# Patient Record
Sex: FEMALE | Race: BLACK OR AFRICAN AMERICAN | NOT HISPANIC OR LATINO | URBAN - METROPOLITAN AREA
[De-identification: names, ages, dates, MRNs, and addresses within clinical notes are randomized per-mention and may not be internally consistent; named-entity substitution may affect disease eponyms.]

---

## 2017-10-21 ENCOUNTER — INPATIENT (INPATIENT)
Facility: HOSPITAL | Age: 82
LOS: 5 days | Discharge: HOME HEALTH | DRG: 871 | End: 2017-10-27
Attending: INTERNAL MEDICINE | Admitting: INTERNAL MEDICINE
Payer: MEDICARE

## 2017-10-21 VITALS
TEMPERATURE: 102 F | HEART RATE: 81 BPM | DIASTOLIC BLOOD PRESSURE: 82 MMHG | HEIGHT: 69 IN | SYSTOLIC BLOOD PRESSURE: 105 MMHG | RESPIRATION RATE: 16 BRPM | OXYGEN SATURATION: 94 % | WEIGHT: 274.92 LBS

## 2017-10-21 DIAGNOSIS — R50.9 FEVER, UNSPECIFIED: ICD-10-CM

## 2017-10-21 DIAGNOSIS — A41.9 SEPSIS, UNSPECIFIED ORGANISM: ICD-10-CM

## 2017-10-21 DIAGNOSIS — R06.02 SHORTNESS OF BREATH: ICD-10-CM

## 2017-10-21 DIAGNOSIS — R74.0 NONSPECIFIC ELEVATION OF LEVELS OF TRANSAMINASE AND LACTIC ACID DEHYDROGENASE [LDH]: ICD-10-CM

## 2017-10-21 DIAGNOSIS — N39.0 URINARY TRACT INFECTION, SITE NOT SPECIFIED: ICD-10-CM

## 2017-10-21 DIAGNOSIS — Z86.79 PERSONAL HISTORY OF OTHER DISEASES OF THE CIRCULATORY SYSTEM: ICD-10-CM

## 2017-10-21 DIAGNOSIS — N30.01 ACUTE CYSTITIS WITH HEMATURIA: ICD-10-CM

## 2017-10-21 DIAGNOSIS — E87.1 HYPO-OSMOLALITY AND HYPONATREMIA: ICD-10-CM

## 2017-10-21 DIAGNOSIS — Z29.9 ENCOUNTER FOR PROPHYLACTIC MEASURES, UNSPECIFIED: ICD-10-CM

## 2017-10-21 DIAGNOSIS — J96.01 ACUTE RESPIRATORY FAILURE WITH HYPOXIA: ICD-10-CM

## 2017-10-21 LAB
ALBUMIN SERPL ELPH-MCNC: 3.3 G/DL — LOW (ref 3.5–5)
ALP SERPL-CCNC: 170 U/L — HIGH (ref 40–120)
ALT FLD-CCNC: 91 U/L DA — HIGH (ref 10–60)
ANION GAP SERPL CALC-SCNC: 5 MMOL/L — SIGNIFICANT CHANGE UP (ref 5–17)
APPEARANCE UR: ABNORMAL
APTT BLD: 30.2 SEC — SIGNIFICANT CHANGE UP (ref 27.5–37.4)
AST SERPL-CCNC: 128 U/L — HIGH (ref 10–40)
BASE EXCESS BLDA CALC-SCNC: -1.8 MMOL/L — SIGNIFICANT CHANGE UP (ref -2–2)
BASE EXCESS BLDV CALC-SCNC: 2.3 MMOL/L — HIGH (ref -2–2)
BILIRUB SERPL-MCNC: 0.9 MG/DL — SIGNIFICANT CHANGE UP (ref 0.2–1.2)
BILIRUB UR-MCNC: NEGATIVE — SIGNIFICANT CHANGE UP
BLOOD GAS COMMENTS ARTERIAL: SIGNIFICANT CHANGE UP
BLOOD GAS COMMENTS, VENOUS: SIGNIFICANT CHANGE UP
BUN SERPL-MCNC: 13 MG/DL — SIGNIFICANT CHANGE UP (ref 7–18)
CALCIUM SERPL-MCNC: 8.4 MG/DL — SIGNIFICANT CHANGE UP (ref 8.4–10.5)
CHLORIDE SERPL-SCNC: 100 MMOL/L — SIGNIFICANT CHANGE UP (ref 96–108)
CO2 SERPL-SCNC: 28 MMOL/L — SIGNIFICANT CHANGE UP (ref 22–31)
COLOR SPEC: YELLOW — SIGNIFICANT CHANGE UP
CREAT SERPL-MCNC: 0.97 MG/DL — SIGNIFICANT CHANGE UP (ref 0.5–1.3)
DIFF PNL FLD: ABNORMAL
GLUCOSE SERPL-MCNC: 186 MG/DL — HIGH (ref 70–99)
GLUCOSE UR QL: NEGATIVE — SIGNIFICANT CHANGE UP
HCO3 BLDA-SCNC: 23 MMOL/L — SIGNIFICANT CHANGE UP (ref 23–27)
HCO3 BLDV-SCNC: 27 MMOL/L — SIGNIFICANT CHANGE UP (ref 21–29)
HCT VFR BLD CALC: 42.3 % — SIGNIFICANT CHANGE UP (ref 34.5–45)
HGB BLD-MCNC: 13.3 G/DL — SIGNIFICANT CHANGE UP (ref 11.5–15.5)
HOROWITZ INDEX BLDA+IHG-RTO: 100 — SIGNIFICANT CHANGE UP
HOROWITZ INDEX BLDV+IHG-RTO: 21 — SIGNIFICANT CHANGE UP
INR BLD: 1.15 RATIO — SIGNIFICANT CHANGE UP (ref 0.88–1.16)
KETONES UR-MCNC: NEGATIVE — SIGNIFICANT CHANGE UP
LACTATE SERPL-SCNC: 1 MMOL/L — SIGNIFICANT CHANGE UP (ref 0.7–2)
LACTATE SERPL-SCNC: 2.1 MMOL/L — HIGH (ref 0.7–2)
LEUKOCYTE ESTERASE UR-ACNC: ABNORMAL
LYMPHOCYTES # BLD AUTO: 14 % — SIGNIFICANT CHANGE UP (ref 13–44)
MCHC RBC-ENTMCNC: 29.9 PG — SIGNIFICANT CHANGE UP (ref 27–34)
MCHC RBC-ENTMCNC: 31.5 GM/DL — LOW (ref 32–36)
MCV RBC AUTO: 94.8 FL — SIGNIFICANT CHANGE UP (ref 80–100)
MONOCYTES NFR BLD AUTO: 9 % — SIGNIFICANT CHANGE UP (ref 2–14)
NEUTROPHILS NFR BLD AUTO: 43 % — SIGNIFICANT CHANGE UP (ref 43–77)
NITRITE UR-MCNC: POSITIVE
PCO2 BLDA: 41 MMHG — SIGNIFICANT CHANGE UP (ref 32–46)
PCO2 BLDV: 42 MMHG — SIGNIFICANT CHANGE UP (ref 35–50)
PH BLDA: 7.36 — SIGNIFICANT CHANGE UP (ref 7.35–7.45)
PH BLDV: 7.42 — SIGNIFICANT CHANGE UP (ref 7.35–7.45)
PH UR: 5 — SIGNIFICANT CHANGE UP (ref 5–8)
PLATELET # BLD AUTO: 196 K/UL — SIGNIFICANT CHANGE UP (ref 150–400)
PO2 BLDA: 334 MMHG — HIGH (ref 74–108)
PO2 BLDV: 48 MMHG — HIGH (ref 25–45)
POTASSIUM SERPL-MCNC: 3.9 MMOL/L — SIGNIFICANT CHANGE UP (ref 3.5–5.3)
POTASSIUM SERPL-SCNC: 3.9 MMOL/L — SIGNIFICANT CHANGE UP (ref 3.5–5.3)
PROT SERPL-MCNC: 7.2 G/DL — SIGNIFICANT CHANGE UP (ref 6–8.3)
PROT UR-MCNC: 30 MG/DL
PROTHROM AB SERPL-ACNC: 12.6 SEC — SIGNIFICANT CHANGE UP (ref 9.8–12.7)
RAPID RVP RESULT: SIGNIFICANT CHANGE UP
RBC # BLD: 4.46 M/UL — SIGNIFICANT CHANGE UP (ref 3.8–5.2)
RBC # FLD: 11.7 % — SIGNIFICANT CHANGE UP (ref 10.3–14.5)
SAO2 % BLDA: SIGNIFICANT CHANGE UP % (ref 92–96)
SAO2 % BLDV: 80 % — SIGNIFICANT CHANGE UP (ref 67–88)
SODIUM SERPL-SCNC: 133 MMOL/L — LOW (ref 135–145)
SP GR SPEC: 1.01 — SIGNIFICANT CHANGE UP (ref 1.01–1.02)
UROBILINOGEN FLD QL: 1
WBC # BLD: 4.1 K/UL — SIGNIFICANT CHANGE UP (ref 3.8–10.5)
WBC # FLD AUTO: 4.1 K/UL — SIGNIFICANT CHANGE UP (ref 3.8–10.5)

## 2017-10-21 PROCEDURE — 71010: CPT | Mod: 26,77

## 2017-10-21 PROCEDURE — 71010: CPT | Mod: 26

## 2017-10-21 PROCEDURE — 99285 EMERGENCY DEPT VISIT HI MDM: CPT

## 2017-10-21 PROCEDURE — 93970 EXTREMITY STUDY: CPT | Mod: 26

## 2017-10-21 RX ORDER — SODIUM CHLORIDE 9 MG/ML
1000 INJECTION INTRAMUSCULAR; INTRAVENOUS; SUBCUTANEOUS
Qty: 0 | Refills: 0 | Status: DISCONTINUED | OUTPATIENT
Start: 2017-10-21 | End: 2017-10-22

## 2017-10-21 RX ORDER — PIPERACILLIN AND TAZOBACTAM 4; .5 G/20ML; G/20ML
3.38 INJECTION, POWDER, LYOPHILIZED, FOR SOLUTION INTRAVENOUS ONCE
Qty: 0 | Refills: 0 | Status: COMPLETED | OUTPATIENT
Start: 2017-10-21 | End: 2017-10-21

## 2017-10-21 RX ORDER — PIPERACILLIN AND TAZOBACTAM 4; .5 G/20ML; G/20ML
3.38 INJECTION, POWDER, LYOPHILIZED, FOR SOLUTION INTRAVENOUS EVERY 8 HOURS
Qty: 0 | Refills: 0 | Status: DISCONTINUED | OUTPATIENT
Start: 2017-10-21 | End: 2017-10-24

## 2017-10-21 RX ORDER — TIOTROPIUM BROMIDE 18 UG/1
1 CAPSULE ORAL; RESPIRATORY (INHALATION) DAILY
Qty: 0 | Refills: 0 | Status: DISCONTINUED | OUTPATIENT
Start: 2017-10-21 | End: 2017-10-23

## 2017-10-21 RX ORDER — IPRATROPIUM/ALBUTEROL SULFATE 18-103MCG
3 AEROSOL WITH ADAPTER (GRAM) INHALATION EVERY 6 HOURS
Qty: 0 | Refills: 0 | Status: DISCONTINUED | OUTPATIENT
Start: 2017-10-21 | End: 2017-10-22

## 2017-10-21 RX ORDER — ETOMIDATE 2 MG/ML
37 INJECTION INTRAVENOUS ONCE
Qty: 0 | Refills: 0 | Status: COMPLETED | OUTPATIENT
Start: 2017-10-21 | End: 2017-10-21

## 2017-10-21 RX ORDER — AZITHROMYCIN 500 MG/1
500 TABLET, FILM COATED ORAL EVERY 24 HOURS
Qty: 0 | Refills: 0 | Status: DISCONTINUED | OUTPATIENT
Start: 2017-10-21 | End: 2017-10-21

## 2017-10-21 RX ORDER — ACETAMINOPHEN 500 MG
650 TABLET ORAL ONCE
Qty: 0 | Refills: 0 | Status: COMPLETED | OUTPATIENT
Start: 2017-10-21 | End: 2017-10-21

## 2017-10-21 RX ORDER — SODIUM CHLORIDE 9 MG/ML
1000 INJECTION INTRAMUSCULAR; INTRAVENOUS; SUBCUTANEOUS
Qty: 0 | Refills: 0 | Status: DISCONTINUED | OUTPATIENT
Start: 2017-10-21 | End: 2017-10-21

## 2017-10-21 RX ORDER — VANCOMYCIN HCL 1 G
1000 VIAL (EA) INTRAVENOUS ONCE
Qty: 0 | Refills: 0 | Status: COMPLETED | OUTPATIENT
Start: 2017-10-21 | End: 2017-10-21

## 2017-10-21 RX ORDER — PANTOPRAZOLE SODIUM 20 MG/1
40 TABLET, DELAYED RELEASE ORAL EVERY 12 HOURS
Qty: 0 | Refills: 0 | Status: DISCONTINUED | OUTPATIENT
Start: 2017-10-21 | End: 2017-10-22

## 2017-10-21 RX ORDER — FENTANYL CITRATE 50 UG/ML
0.5 INJECTION INTRAVENOUS
Qty: 2500 | Refills: 0 | Status: DISCONTINUED | OUTPATIENT
Start: 2017-10-21 | End: 2017-10-21

## 2017-10-21 RX ORDER — ACETAMINOPHEN 500 MG
650 TABLET ORAL EVERY 6 HOURS
Qty: 0 | Refills: 0 | Status: DISCONTINUED | OUTPATIENT
Start: 2017-10-21 | End: 2017-10-21

## 2017-10-21 RX ORDER — FENTANYL CITRATE 50 UG/ML
0.5 INJECTION INTRAVENOUS
Qty: 2500 | Refills: 0 | Status: DISCONTINUED | OUTPATIENT
Start: 2017-10-21 | End: 2017-10-22

## 2017-10-21 RX ORDER — ENOXAPARIN SODIUM 100 MG/ML
40 INJECTION SUBCUTANEOUS DAILY
Qty: 0 | Refills: 0 | Status: DISCONTINUED | OUTPATIENT
Start: 2017-10-21 | End: 2017-10-21

## 2017-10-21 RX ORDER — PROPOFOL 10 MG/ML
50 INJECTION, EMULSION INTRAVENOUS
Qty: 1000 | Refills: 0 | Status: DISCONTINUED | OUTPATIENT
Start: 2017-10-21 | End: 2017-10-22

## 2017-10-21 RX ORDER — SODIUM CHLORIDE 9 MG/ML
3 INJECTION INTRAMUSCULAR; INTRAVENOUS; SUBCUTANEOUS ONCE
Qty: 0 | Refills: 0 | Status: COMPLETED | OUTPATIENT
Start: 2017-10-21 | End: 2017-10-21

## 2017-10-21 RX ORDER — ACETAMINOPHEN 500 MG
650 TABLET ORAL EVERY 6 HOURS
Qty: 0 | Refills: 0 | Status: DISCONTINUED | OUTPATIENT
Start: 2017-10-21 | End: 2017-10-22

## 2017-10-21 RX ORDER — SODIUM CHLORIDE 9 MG/ML
3700 INJECTION INTRAMUSCULAR; INTRAVENOUS; SUBCUTANEOUS ONCE
Qty: 0 | Refills: 0 | Status: COMPLETED | OUTPATIENT
Start: 2017-10-21 | End: 2017-10-21

## 2017-10-21 RX ORDER — ALBUTEROL 90 UG/1
1 AEROSOL, METERED ORAL EVERY 4 HOURS
Qty: 0 | Refills: 0 | Status: DISCONTINUED | OUTPATIENT
Start: 2017-10-21 | End: 2017-10-22

## 2017-10-21 RX ORDER — CEFTRIAXONE 500 MG/1
1 INJECTION, POWDER, FOR SOLUTION INTRAMUSCULAR; INTRAVENOUS EVERY 24 HOURS
Qty: 0 | Refills: 0 | Status: DISCONTINUED | OUTPATIENT
Start: 2017-10-21 | End: 2017-10-21

## 2017-10-21 RX ORDER — PANTOPRAZOLE SODIUM 20 MG/1
8 TABLET, DELAYED RELEASE ORAL
Qty: 80 | Refills: 0 | Status: DISCONTINUED | OUTPATIENT
Start: 2017-10-21 | End: 2017-10-22

## 2017-10-21 RX ADMIN — CEFTRIAXONE 100 GRAM(S): 500 INJECTION, POWDER, FOR SOLUTION INTRAMUSCULAR; INTRAVENOUS at 22:00

## 2017-10-21 RX ADMIN — ETOMIDATE 37 MILLIGRAM(S): 2 INJECTION INTRAVENOUS at 22:00

## 2017-10-21 RX ADMIN — SODIUM CHLORIDE 4933.33 MILLILITER(S): 9 INJECTION INTRAMUSCULAR; INTRAVENOUS; SUBCUTANEOUS at 15:00

## 2017-10-21 RX ADMIN — Medication 650 MILLIGRAM(S): at 15:26

## 2017-10-21 RX ADMIN — SODIUM CHLORIDE 3 MILLILITER(S): 9 INJECTION INTRAMUSCULAR; INTRAVENOUS; SUBCUTANEOUS at 15:12

## 2017-10-21 RX ADMIN — AZITHROMYCIN 250 MILLIGRAM(S): 500 TABLET, FILM COATED ORAL at 22:00

## 2017-10-21 RX ADMIN — PIPERACILLIN AND TAZOBACTAM 25 GRAM(S): 4; .5 INJECTION, POWDER, LYOPHILIZED, FOR SOLUTION INTRAVENOUS at 23:44

## 2017-10-21 RX ADMIN — FENTANYL CITRATE 6.24 MICROGRAM(S)/KG/HR: 50 INJECTION INTRAVENOUS at 23:45

## 2017-10-21 RX ADMIN — Medication 250 MILLIGRAM(S): at 15:28

## 2017-10-21 RX ADMIN — PANTOPRAZOLE SODIUM 40 MILLIGRAM(S): 20 TABLET, DELAYED RELEASE ORAL at 23:45

## 2017-10-21 RX ADMIN — Medication 3 MILLILITER(S): at 22:29

## 2017-10-21 RX ADMIN — PROPOFOL 37.41 MICROGRAM(S)/KG/MIN: 10 INJECTION, EMULSION INTRAVENOUS at 23:48

## 2017-10-21 RX ADMIN — PIPERACILLIN AND TAZOBACTAM 200 GRAM(S): 4; .5 INJECTION, POWDER, LYOPHILIZED, FOR SOLUTION INTRAVENOUS at 15:27

## 2017-10-21 NOTE — CONSULT NOTE ADULT - PROBLEM SELECTOR RECOMMENDATION 5
possibly 2/2 legionella pneumonia  hypovolemic hyponatremia  - NS IVF  - check urine osm and Na possibly 2/2 legionella pneumonia  not obstructive picture  possibly 2/2 sepsis, hepatic congestion 2/2 CHF less likely  - follow up viral hepatitis panel in am  - monitor

## 2017-10-21 NOTE — ED ADULT NURSE REASSESSMENT NOTE - NS ED NURSE REASSESS COMMENT FT1
pt was noted to be in respiratory distress while being transported back to ED from [facility], oxygen sat was 80% with supplemental oxygen via nasal cannula, switched to 100% NRB mask and RRT called @ 2143.  RRT Team Mason Barraza, [place]  decided to intubate pt, Anesthesiologist intubated pt with 7.5 ETT taped @ 22.5 on the right lip. Vent settings set by RT Susan. Pt started on propofol drip for sedation, long fr 16 cath inserted and report given to ICU WINTER Woodson at 2150.

## 2017-10-21 NOTE — H&P ADULT - HISTORY OF PRESENT ILLNESS
84yo F with pmhx of legal blindness, HTN, and ?prediabetes came in with c/o fever since the past 2-3 days. She said this morning she also felt weak so her family bought her in. She is also SOB. Denies chest pain, abdominal pain, or dysuria. Denies nausea or vomiting. She c/o chronic b/l knee pain due to arthritis. Also states having pain in small ulcer below her left arm along with itching since the past couple of days. Denies cough, but does have phlegm production. No other complaints at this time    Patient is febrile upto 102 in the ED with lactate of 2.1. No leukocytosis or left shift. UA: dirty. CXR- PNA.    Patient is mostly bedbound. Ambulates rarely with walker due to chronic knee pain. Lives with family at home. Family also serves as HHA.

## 2017-10-21 NOTE — CONSULT NOTE ADULT - PROBLEM SELECTOR RECOMMENDATION 6
Bp stable for now  - hold hyzaar in context of sepsis possibly 2/2 legionella pneumonia  hypovolemic hyponatremia  - NS IVF  - check urine osm and Na

## 2017-10-21 NOTE — CONSULT NOTE ADULT - SUBJECTIVE AND OBJECTIVE BOX
Patient is a 85y old  Female who presents with a chief complaint of Fever (21 Oct 2017 19:30), weakness and shortness of breath      Initial HPI on admission:  HPI:  86yo female from home, walks with walker, lives with daughter and granddaughter, being cared for by family, PMH HTN, 'pre-diabetes,' right knww OA, legally blind 2/2 glaucoma/cataracts brought to ED by family 2/2 1-2 days of weakness, subjective fever, chills.  Patient is intubated and unable to provide history.  History obtained from HCP, granddaughter Mirela ([phone]).  Patient was in usual health when last night endorsed 'not feeling well.' This morning, patient felt weak and feverish and brought to ED by family.   Patient is febrile upto 102 in the ED with lactate of 2.1. No leukocytosis or left shift. UA: dirty. CXR- PNA.    Patient is mostly bedbound. Ambulates rarely with walker due to chronic knee pain. Lives with family at home. Family also serves as HHA. (21 Oct 2017 19:30)      BRIEF HOSPITAL COURSE: ***    PAST MEDICAL & SURGICAL HISTORY:  Legally blind  Arthritis  History of hypertension    Allergies    No Known Allergies    Intolerances      FAMILY HISTORY:    Social history reviewed: ***    Review of Systems:  CONSTITUTIONAL: No fever, chills, or fatigue  EYES: No eye pain, visual disturbances, or discharge  ENMT:  No difficulty hearing, tinnitus, vertigo; No sinus or throat pain  NECK: No pain or stiffness  RESPIRATORY: No cough, wheezing, chills or hemoptysis; No shortness of breath  CARDIOVASCULAR: No chest pain, palpitations, dizziness, or leg swelling  GASTROINTESTINAL: No abdominal or epigastric pain. No nausea, vomiting, or hematemesis; No diarrhea or constipation. No melena or hematochezia.  GENITOURINARY: No dysuria, frequency, hematuria, or incontinence  NEUROLOGICAL: No headaches, memory loss, loss of strength, numbness, or tremors  SKIN: No itching, burning, rashes, or lesions   MUSCULOSKELETAL: No joint pain or swelling; No muscle, back, or extremity pain  PSYCHIATRIC: No depression, anxiety, mood swings, or difficulty sleeping      Medications:  ALBUTerol    90 MICROgram(s) HFA Inhaler 1 Puff(s) Inhalation every 4 hours  ALBUTerol/ipratropium for Nebulization 3 milliLiter(s) Nebulizer every 6 hours  etomidate Injectable 37 milliGRAM(s) IV Push once  fentaNYL   Infusion 0.5 MICROgram(s)/kG/Hr IV Continuous <Continuous>  levoFLOXacin IVPB 500 milliGRAM(s) IV Intermittent once  levoFLOXacin IVPB      pantoprazole  Injectable 40 milliGRAM(s) IV Push every 12 hours  piperacillin/tazobactam IVPB. 3.375 Gram(s) IV Intermittent every 8 hours  propofol Infusion 50 MICROgram(s)/kG/Min IV Continuous <Continuous>  sodium chloride 0.9%. 1000 milliLiter(s) IV Continuous <Continuous>  tiotropium 18 MICROgram(s) Capsule 1 Capsule(s) Inhalation daily      vent settings  Mode: AC/ CMV (Assist Control/ Continuous Mandatory Ventilation)  RR (machine): 14  TV (machine): 500  FiO2: 100  PEEP: 5  MAP: 9  PIP: 29      Vital Signs Last 24 Hrs  T(C): 37.2 (21 Oct 2017 20:00), Max: 39.3 (21 Oct 2017 15:00)  T(F): 99 (21 Oct 2017 20:00), Max: 102.8 (21 Oct 2017 15:00)  HR: 78 (21 Oct 2017 20:00) (78 - 81)  BP: 112/76 (21 Oct 2017 20:00) (105/82 - 112/76)  BP(mean): --  RR: 16 (21 Oct 2017 20:00) (16 - 16)  SpO2: 93% (21 Oct 2017 22:26) (93% - 96%)              LABS:                        13.3   4.1   )-----------( 196      ( 21 Oct 2017 14:55 )             42.3     10-21    133<L>  |  100  |  13  ----------------------------<  186<H>  3.9   |  28  |  0.97    Ca    8.4      21 Oct 2017 14:55    TPro  7.2  /  Alb  3.3<L>  /  TBili  0.9  /  DBili  x   /  AST  128<H>  /  ALT  91<H>  /  AlkPhos  170<H>  10-21          CAPILLARY BLOOD GLUCOSE  167 (21 Oct 2017 15:00)      POCT Blood Glucose.: 147 mg/dL (21 Oct 2017 21:43)      Urinalysis Basic - ( 21 Oct 2017 16:48 )    Color: Yellow / Appearance: Slightly Turbid / S.010 / pH: x  Gluc: x / Ketone: Negative  / Bili: Negative / Urobili: 1   Blood: x / Protein: 30 mg/dL / Nitrite: Positive   Leuk Esterase: Moderate / RBC: 2-5 /HPF / WBC >50 /HPF   Sq Epi: x / Non Sq Epi: Few /HPF / Bacteria: Many /HPF      CULTURES:        Physical Examination:    General: No acute distress.      HEENT: Pupils equal, reactive to light.  Symmetric.    PULM: Clear to auscultation bilaterally, no significant sputum production    CVS: Regular rate and rhythm, no murmurs, rubs, or gallops    ABD: Soft, nondistended, nontender, normoactive bowel sounds, no masses    EXT: No edema, nontender    SKIN: Warm and well perfused, no rashes noted.    NEURO: Alert, oriented, interactive, nonfocal    RADIOLOGY REVIEWED ***    CRITICAL CARE TIME SPENT: 35 minutes Patient is a 85y old  Female who presents with a chief complaint of Fever (21 Oct 2017 19:30), weakness and shortness of breath      Initial HPI on admission:  HPI:  86yo female from home, walks with walker, lives with daughter and granddaughter, being cared for by family, PMH HTN, 'pre-diabetes,' right knww OA, legally blind 2/2 glaucoma/cataracts brought to ED by family 2/2 1-2 days of weakness, subjective fever, chills.  Patient is intubated and unable to provide history.  History obtained from HCP, granddaughter Mirela ([phone]).  Patient was in usual health when last night endorsed 'not feeling well.' This morning, patient felt weak and feverish and brought to ED by family.  ROS + sore throat, chronic YUAN after 20 steps, intermittent leg swelling.  Denies headache, change in vision, sinus pain, cough, chest pain, B/V, diarrhea, dysuria, hematuria, rectal bleeding, rash.  No recent travel and no sick contacts.  Did not obtain flu or pneumococcal vaccine.      Unsure whether obtained colonoscopy and mammogram.     Patient tales hyzaar for HTN and 'water pill'    GOC discussed with granddaughter - patient is full code    BRIEF HOSPITAL COURSE:   In ED, patient was febrile to 102.8 /82 - code sepsis  lactate 2.1 -> 1.0 after 3700cc bolus  WBC 4,000 with 13% bands  elevated transaminases and alk phos with normal TB  BUN/Cr 13/0.97, GFR 62  UA grossly positive  CXR: multifocal right lower lobe and perihilar infiltrates  Received vancomycin 1 gm and zosyn 3.375 gm  BCx and UCx sent    Rapid response called for respiratory distress and change in mental status  On exam, patient lethargic, tachypneic with abdominal breathing, 02 sat on face mask 75%-80%  Anesthesia called, and patient emergently intubated.  Difficult intubation with possible esophageal intubation with oral mucosal bleeding.  Patient re-intubated successfully  CXR post-intubation: worsening multifocal right infiltrates, ETT and NGT in correct position    PAST MEDICAL & SURGICAL HISTORY:  Legally blind  Arthritis  History of hypertension    Allergies    No Known Allergies    Intolerances      FAMILY HISTORY:    Social history reviewed:   no etoh  never smoker    Review of Systems:  CONSTITUTIONAL: fever, chills  EYES: legally blind  ENMT:  + throat pain  NECK: No pain   RESPIRATORY: No cough, + SOB in ED  CARDIOVASCULAR: No chest pain, + intermittent leg swelling  GASTROINTESTINAL: No abdominal or epigastric pain. No nausea, vomiting, or hematemesis; No diarrhea  or hematochezia.  GENITOURINARY: No dysuria  NEUROLOGICAL: No loss of strength  SKIN: left arm 'sores'  MUSCULOSKELETAL: No joint pain or swelling      Medications:  ALBUTerol    90 MICROgram(s) HFA Inhaler 1 Puff(s) Inhalation every 4 hours  ALBUTerol/ipratropium for Nebulization 3 milliLiter(s) Nebulizer every 6 hours  etomidate Injectable 37 milliGRAM(s) IV Push once  fentaNYL   Infusion 0.5 MICROgram(s)/kG/Hr IV Continuous <Continuous>  levoFLOXacin IVPB 500 milliGRAM(s) IV Intermittent once  levoFLOXacin IVPB      pantoprazole  Injectable 40 milliGRAM(s) IV Push every 12 hours  piperacillin/tazobactam IVPB. 3.375 Gram(s) IV Intermittent every 8 hours  propofol Infusion 50 MICROgram(s)/kG/Min IV Continuous <Continuous>  sodium chloride 0.9%. 1000 milliLiter(s) IV Continuous <Continuous>  tiotropium 18 MICROgram(s) Capsule 1 Capsule(s) Inhalation daily      vent settings  Mode: AC/ CMV (Assist Control/ Continuous Mandatory Ventilation)  RR (machine): 14  TV (machine): 500  FiO2: 100  PEEP: 5  MAP: 9  PIP: 29      Vital Signs Last 24 Hrs  T(C): 37.2 (21 Oct 2017 20:00), Max: 39.3 (21 Oct 2017 15:00)  T(F): 99 (21 Oct 2017 20:00), Max: 102.8 (21 Oct 2017 15:00)  HR: 78 (21 Oct 2017 20:00) (78 - 81)  BP: 112/76 (21 Oct 2017 20:00) (105/82 - 112/76)  BP(mean): --  RR: 16 (21 Oct 2017 20:00) (16 - 16)  SpO2: 93% (21 Oct 2017 22:26) (93% - 96%)              LABS:                        13.3   4.1   )-----------( 196      ( 21 Oct 2017 14:55 )             42.3     10-21    133<L>  |  100  |  13  ----------------------------<  186<H>  3.9   |  28  |  0.97    Ca    8.4      21 Oct 2017 14:55    TPro  7.2  /  Alb  3.3<L>  /  TBili  0.9  /  DBili  x   /  AST  128<H>  /  ALT  91<H>  /  AlkPhos  170<H>  10-21          CAPILLARY BLOOD GLUCOSE  167 (21 Oct 2017 15:00)      POCT Blood Glucose.: 147 mg/dL (21 Oct 2017 21:43)      Urinalysis Basic - ( 21 Oct 2017 16:48 )    Color: Yellow / Appearance: Slightly Turbid / S.010 / pH: x  Gluc: x / Ketone: Negative  / Bili: Negative / Urobili: 1   Blood: x / Protein: 30 mg/dL / Nitrite: Positive   Leuk Esterase: Moderate / RBC: 2-5 /HPF / WBC >50 /HPF   Sq Epi: x / Non Sq Epi: Few /HPF / Bacteria: Many /HPF      CULTURES:        Physical Examination:    General: No acute distress.      HEENT: Pupils equal, reactive to light.  Symmetric.    PULM: Clear to auscultation bilaterally, no significant sputum production    CVS: Regular rate and rhythm, no murmurs, rubs, or gallops    ABD: Soft, nondistended, nontender, normoactive bowel sounds, no masses    EXT: No edema, nontender    SKIN: Warm and well perfused, no rashes noted.    NEURO: Alert, oriented, interactive, nonfocal    RADIOLOGY REVIEWED ***    CRITICAL CARE TIME SPENT: 35 minutes Patient is a 85y old  Female who presents with a chief complaint of Fever (21 Oct 2017 19:30), weakness and shortness of breath      Initial HPI on admission:  HPI:  84yo female from home, walks with walker, lives with daughter and granddaughter, being cared for by family, PMH HTN, 'pre-diabetes,' right knee OA, legally blind 2/2 glaucoma/cataracts brought to ED by family 2/2 1-2 days of weakness, subjective fever, chills.  Patient is intubated and unable to provide history.  History obtained from HCP, granddaughter Mirela ([phone]).  Patient was in usual health when last night endorsed 'not feeling well.' This morning, patient felt weak and feverish and brought to ED by family.  ROS + sore throat, chronic YUAN after 20 steps, intermittent leg swelling.  Denies headache, change in vision, sinus pain, cough, chest pain, B/V, diarrhea, dysuria, hematuria, rectal bleeding, rash.  No recent travel and no sick contacts.  Did not obtain flu or pneumococcal vaccine.      Unsure whether obtained colonoscopy and mammogram.     Patient tales hyzaar for HTN and 'water pill'    GOC discussed with granddaughter - patient is full code    BRIEF HOSPITAL COURSE:   In ED, patient was febrile to 102.8 /82 - code sepsis  lactate 2.1 -> 1.0 after 3700cc bolus  WBC 4,000 with 13% bands  elevated transaminases and alk phos with normal TB  BUN/Cr 13/0.97, GFR 62  UA grossly positive  CXR: multifocal right lower lobe and perihilar infiltrates  Received vancomycin 1 gm and zosyn 3.375 gm  BCx and UCx sent    Rapid response called for respiratory distress and change in mental status  On exam, patient lethargic, tachypneic with abdominal breathing, 02 sat on face mask 75%-80%  Anesthesia called, and patient emergently intubated.  Difficult intubation with possible esophageal intubation with oral mucosal bleeding.  Patient re-intubated successfully  CXR post-intubation: worsening multifocal right infiltrates, ETT and NGT in correct position    PAST MEDICAL & SURGICAL HISTORY:  Legally blind  Arthritis  History of hypertension    Allergies    No Known Allergies    Intolerances      FAMILY HISTORY:    Social history reviewed:   no etoh  never smoker    Review of Systems:  CONSTITUTIONAL: fever, chills  EYES: legally blind  ENMT:  + throat pain  NECK: No pain   RESPIRATORY: No cough, + SOB in ED  CARDIOVASCULAR: No chest pain, + intermittent leg swelling  GASTROINTESTINAL: No abdominal or epigastric pain. No nausea, vomiting, or hematemesis; No diarrhea  or hematochezia.  GENITOURINARY: No dysuria  NEUROLOGICAL: No loss of strength  SKIN: left arm 'sores'  MUSCULOSKELETAL: No joint pain or swelling      Medications:  ALBUTerol    90 MICROgram(s) HFA Inhaler 1 Puff(s) Inhalation every 4 hours  ALBUTerol/ipratropium for Nebulization 3 milliLiter(s) Nebulizer every 6 hours  etomidate Injectable 37 milliGRAM(s) IV Push once  fentaNYL   Infusion 0.5 MICROgram(s)/kG/Hr IV Continuous <Continuous>  levoFLOXacin IVPB 500 milliGRAM(s) IV Intermittent once  levoFLOXacin IVPB      pantoprazole  Injectable 40 milliGRAM(s) IV Push every 12 hours  piperacillin/tazobactam IVPB. 3.375 Gram(s) IV Intermittent every 8 hours  propofol Infusion 50 MICROgram(s)/kG/Min IV Continuous <Continuous>  sodium chloride 0.9%. 1000 milliLiter(s) IV Continuous <Continuous>  tiotropium 18 MICROgram(s) Capsule 1 Capsule(s) Inhalation daily      vent settings  Mode: AC/ CMV (Assist Control/ Continuous Mandatory Ventilation)  RR (machine): 14  TV (machine): 500  FiO2: 100  PEEP: 5  MAP: 9  PIP: 29      Vital Signs Last 24 Hrs  T(C): 37.2 (21 Oct 2017 20:00), Max: 39.3 (21 Oct 2017 15:00)  T(F): 99 (21 Oct 2017 20:00), Max: 102.8 (21 Oct 2017 15:00)  HR: 78 (21 Oct 2017 20:00) (78 - 81)  BP: 112/76 (21 Oct 2017 20:00) (105/82 - 112/76)  BP(mean): --  RR: 16 (21 Oct 2017 20:00) (16 - 16)  SpO2: 93% (21 Oct 2017 22:26) (93% - 96%)              LABS:                        13.3   4.1   )-----------( 196      ( 21 Oct 2017 14:55 )             42.3     10-21    133<L>  |  100  |  13  ----------------------------<  186<H>  3.9   |  28  |  0.97    Ca    8.4      21 Oct 2017 14:55    TPro  7.2  /  Alb  3.3<L>  /  TBili  0.9  /  DBili  x   /  AST  128<H>  /  ALT  91<H>  /  AlkPhos  170<H>  10-21          CAPILLARY BLOOD GLUCOSE  167 (21 Oct 2017 15:00)      POCT Blood Glucose.: 147 mg/dL (21 Oct 2017 21:43)      Urinalysis Basic - ( 21 Oct 2017 16:48 )    Color: Yellow / Appearance: Slightly Turbid / S.010 / pH: x  Gluc: x / Ketone: Negative  / Bili: Negative / Urobili: 1   Blood: x / Protein: 30 mg/dL / Nitrite: Positive   Leuk Esterase: Moderate / RBC: 2-5 /HPF / WBC >50 /HPF   Sq Epi: x / Non Sq Epi: Few /HPF / Bacteria: Many /HPF      CULTURES:        Physical Examination:    General: No acute distress.      HEENT: Pupils equal, reactive to light.  Symmetric.    PULM: diffused rhonchi bilaterally    CVS: Regular rate and rhythm    ABD: Soft, obese, no guarding, normoactive bowel sounds    EXT: No edema, nontender    SKIN: Warm and well perfused, healed abrasion left axilla, healed punctate lesion right flank.    NEURO: rass -2, on sedation; moved all extremities before intubation    RADIOLOGY REVIEWED Patient is a 85y old  Female who presents with a chief complaint of Fever (21 Oct 2017 19:30), weakness and shortness of breath      Initial HPI on admission:  HPI:  84yo female from home, walks with walker, lives with daughter and granddaughter, being cared for by family, PMH HTN, 'pre-diabetes,' right knee OA, legally blind 2/2 glaucoma/cataracts brought to ED by family 2/2 1-2 days of weakness, subjective fever, chills.  Patient is intubated and unable to provide history.  History obtained from HCP, granddaughter Mirela ([phone]).  Patient was in usual health when last night endorsed 'not feeling well.' This morning, patient felt weak and feverish and brought to ED by family.  ROS + sore throat, chronic YUAN after 20 steps, intermittent leg swelling.  Denies headache, change in vision, sinus pain, cough, chest pain, B/V, diarrhea, dysuria, hematuria, rectal bleeding, rash.  No recent travel and no sick contacts.  Did not obtain flu or pneumococcal vaccine.      Unsure whether obtained colonoscopy and mammogram.     Patient takes hyzaar for HTN and 'water pill'    GOC discussed with granddaughter - patient is full code    BRIEF HOSPITAL COURSE:   In ED, patient was febrile to 102.8 /82 - code sepsis  lactate 2.1 -> 1.0 after 3700cc bolus  WBC 4,000 with 13% bands  elevated transaminases and alk phos with normal TB  BUN/Cr 13/0.97, GFR 62  UA grossly positive  CXR: multifocal right lower lobe and perihilar infiltrates  Received vancomycin 1 gm and zosyn 3.375 gm  BCx and UCx sent    Rapid response called for respiratory distress and change in mental status  On exam, patient lethargic, tachypneic with abdominal breathing, 02 sat on face mask 75%-80%  Anesthesia called, and patient emergently intubated.  Difficult intubation with possible esophageal intubation with oral mucosal bleeding.  Patient re-intubated successfully  CXR post-intubation: worsening multifocal right infiltrates, ETT and NGT in correct position    PAST MEDICAL & SURGICAL HISTORY:  Legally blind  Arthritis  History of hypertension    Allergies    No Known Allergies    Intolerances      FAMILY HISTORY:    Social history reviewed:   no etoh  never smoker    Review of Systems:  CONSTITUTIONAL: fever, chills  EYES: legally blind  ENMT:  + throat pain  NECK: No pain   RESPIRATORY: No cough, + SOB in ED  CARDIOVASCULAR: No chest pain, + intermittent leg swelling  GASTROINTESTINAL: No abdominal or epigastric pain. No nausea, vomiting, or hematemesis; No diarrhea  or hematochezia.  GENITOURINARY: No dysuria  NEUROLOGICAL: No loss of strength  SKIN: left arm 'sores'  MUSCULOSKELETAL: No joint pain or swelling      Medications:  ALBUTerol    90 MICROgram(s) HFA Inhaler 1 Puff(s) Inhalation every 4 hours  ALBUTerol/ipratropium for Nebulization 3 milliLiter(s) Nebulizer every 6 hours  etomidate Injectable 37 milliGRAM(s) IV Push once  fentaNYL   Infusion 0.5 MICROgram(s)/kG/Hr IV Continuous <Continuous>  levoFLOXacin IVPB 500 milliGRAM(s) IV Intermittent once  levoFLOXacin IVPB      pantoprazole  Injectable 40 milliGRAM(s) IV Push every 12 hours  piperacillin/tazobactam IVPB. 3.375 Gram(s) IV Intermittent every 8 hours  propofol Infusion 50 MICROgram(s)/kG/Min IV Continuous <Continuous>  sodium chloride 0.9%. 1000 milliLiter(s) IV Continuous <Continuous>  tiotropium 18 MICROgram(s) Capsule 1 Capsule(s) Inhalation daily      vent settings  Mode: AC/ CMV (Assist Control/ Continuous Mandatory Ventilation)  RR (machine): 14  TV (machine): 500  FiO2: 100  PEEP: 5  MAP: 9  PIP: 29      Vital Signs Last 24 Hrs  T(C): 37.2 (21 Oct 2017 20:00), Max: 39.3 (21 Oct 2017 15:00)  T(F): 99 (21 Oct 2017 20:00), Max: 102.8 (21 Oct 2017 15:00)  HR: 78 (21 Oct 2017 20:00) (78 - 81)  BP: 112/76 (21 Oct 2017 20:00) (105/82 - 112/76)  BP(mean): --  RR: 16 (21 Oct 2017 20:00) (16 - 16)  SpO2: 93% (21 Oct 2017 22:26) (93% - 96%)              LABS:                        13.3   4.1   )-----------( 196      ( 21 Oct 2017 14:55 )             42.3     10-21    133<L>  |  100  |  13  ----------------------------<  186<H>  3.9   |  28  |  0.97    Ca    8.4      21 Oct 2017 14:55    TPro  7.2  /  Alb  3.3<L>  /  TBili  0.9  /  DBili  x   /  AST  128<H>  /  ALT  91<H>  /  AlkPhos  170<H>  10-21          CAPILLARY BLOOD GLUCOSE  167 (21 Oct 2017 15:00)      POCT Blood Glucose.: 147 mg/dL (21 Oct 2017 21:43)      Urinalysis Basic - ( 21 Oct 2017 16:48 )    Color: Yellow / Appearance: Slightly Turbid / S.010 / pH: x  Gluc: x / Ketone: Negative  / Bili: Negative / Urobili: 1   Blood: x / Protein: 30 mg/dL / Nitrite: Positive   Leuk Esterase: Moderate / RBC: 2-5 /HPF / WBC >50 /HPF   Sq Epi: x / Non Sq Epi: Few /HPF / Bacteria: Many /HPF      CULTURES:        Physical Examination:    General: No acute distress.      HEENT: Pupils equal, reactive to light.  Symmetric.    PULM: diffused rhonchi bilaterally    CVS: Regular rate and rhythm    ABD: Soft, obese, no guarding, normoactive bowel sounds    EXT: No edema, nontender    SKIN: Warm and well perfused, healed abrasion left axilla, healed punctate lesion right flank.    NEURO: rass -2, on sedation; moved all extremities before intubation    RADIOLOGY REVIEWED Patient is a 85y old  Female who presents with a chief complaint of Fever (21 Oct 2017 19:30), weakness and shortness of breath      Initial HPI on admission:  HPI:  84yo female from home, walks with walker, lives with daughter and granddaughter, being cared for by family, PMH HTN, 'pre-diabetes,' right knee OA, legally blind 2/2 glaucoma/cataracts brought to ED by family 2/2 1-2 days of weakness, subjective fever, chills.  Patient is intubated and unable to provide history.  History obtained from HCP, granddaughter Mirela ([phone]).  Patient was in usual health when last night endorsed 'not feeling well.' This morning, patient felt weak and feverish and brought to ED by family.  ROS + sore throat, chronic YUAN after 20 steps, intermittent leg swelling.  Denies headache, change in vision, sinus pain, cough, chest pain, B/V, diarrhea, dysuria, hematuria, rectal bleeding, rash.  No recent travel and no sick contacts.  Did not obtain flu or pneumococcal vaccine.      Unsure whether obtained colonoscopy and mammogram.     Patient takes hyzaar for HTN and 'water pill'    GOC discussed with granddaughter - patient is full code    BRIEF HOSPITAL COURSE:   In ED, patient was febrile to 102.8 /82 - code sepsis  lactate 2.1 -> 1.0 after 3700cc bolus  WBC 4,000 with 13% bands  elevated transaminases and alk phos with normal TB  BUN/Cr 13/0.97, GFR 62  UA grossly positive  CXR: multifocal right lower lobe and perihilar infiltrates  Received vancomycin 1 gm and zosyn 3.375 gm  BCx and UCx sent    Rapid response called for respiratory distress and change in mental status  On exam, patient lethargic, tachypneic with abdominal breathing, 02 sat on face mask 75%-80%  Anesthesia called, and patient emergently intubated.  Difficult intubation with possible esophageal intubation with oral mucosal bleeding.  Patient re-intubated successfully  CXR post-intubation: worsening multifocal right infiltrates, ETT and NGT in correct position    PAST MEDICAL & SURGICAL HISTORY:  Legally blind  Arthritis  History of hypertension    Allergies    No Known Allergies    Intolerances      FAMILY HISTORY:    Social history reviewed:   no etoh  never smoker    Review of Systems:  CONSTITUTIONAL: fever, chills  EYES: legally blind  ENMT:  + throat pain  NECK: No pain   RESPIRATORY: No cough, + SOB in ED  CARDIOVASCULAR: No chest pain, + intermittent leg swelling  GASTROINTESTINAL: No abdominal or epigastric pain. No nausea, vomiting, or hematemesis; No diarrhea  or hematochezia.  GENITOURINARY: No dysuria  NEUROLOGICAL: No loss of strength  SKIN: left arm 'sores'  MUSCULOSKELETAL: No joint pain or swelling      Medications:  ALBUTerol    90 MICROgram(s) HFA Inhaler 1 Puff(s) Inhalation every 4 hours  ALBUTerol/ipratropium for Nebulization 3 milliLiter(s) Nebulizer every 6 hours  etomidate Injectable 37 milliGRAM(s) IV Push once  fentaNYL   Infusion 0.5 MICROgram(s)/kG/Hr IV Continuous <Continuous>  levoFLOXacin IVPB 500 milliGRAM(s) IV Intermittent once  levoFLOXacin IVPB      pantoprazole  Injectable 40 milliGRAM(s) IV Push every 12 hours  piperacillin/tazobactam IVPB. 3.375 Gram(s) IV Intermittent every 8 hours  propofol Infusion 50 MICROgram(s)/kG/Min IV Continuous <Continuous>  sodium chloride 0.9%. 1000 milliLiter(s) IV Continuous <Continuous>  tiotropium 18 MICROgram(s) Capsule 1 Capsule(s) Inhalation daily      vent settings  Mode: AC/ CMV (Assist Control/ Continuous Mandatory Ventilation)  RR (machine): 14  TV (machine): 500  FiO2: 100  PEEP: 5  MAP: 9  PIP: 29      Vital Signs Last 24 Hrs  T(C): 37.2 (21 Oct 2017 20:00), Max: 39.3 (21 Oct 2017 15:00)  T(F): 99 (21 Oct 2017 20:00), Max: 102.8 (21 Oct 2017 15:00)  HR: 78 (21 Oct 2017 20:00) (78 - 81)  BP: 112/76 (21 Oct 2017 20:00) (105/82 - 112/76)  BP(mean): --  RR: 16 (21 Oct 2017 20:00) (16 - 16)  SpO2: 93% (21 Oct 2017 22:26) (93% - 96%)              LABS:                        13.3   4.1   )-----------( 196      ( 21 Oct 2017 14:55 )             42.3     10-21    133<L>  |  100  |  13  ----------------------------<  186<H>  3.9   |  28  |  0.97    Ca    8.4      21 Oct 2017 14:55    TPro  7.2  /  Alb  3.3<L>  /  TBili  0.9  /  DBili  x   /  AST  128<H>  /  ALT  91<H>  /  AlkPhos  170<H>  10-21          CAPILLARY BLOOD GLUCOSE  167 (21 Oct 2017 15:00)      POCT Blood Glucose.: 147 mg/dL (21 Oct 2017 21:43)      Urinalysis Basic - ( 21 Oct 2017 16:48 )    Color: Yellow / Appearance: Slightly Turbid / S.010 / pH: x  Gluc: x / Ketone: Negative  / Bili: Negative / Urobili: 1   Blood: x / Protein: 30 mg/dL / Nitrite: Positive   Leuk Esterase: Moderate / RBC: 2-5 /HPF / WBC >50 /HPF   Sq Epi: x / Non Sq Epi: Few /HPF / Bacteria: Many /HPF      CULTURES:        Physical Examination:    General: respiratory distress, abdominal breathing, lethargic    HEENT: left eye scarred, right pupil reactive    PULM: diffused rhonchi bilaterally    CVS: Regular rate and rhythm    ABD: Soft, obese, no guarding, hypoactive bowel sounds    EXT: No edema, nontender    SKIN: Warm and well perfused, healed abrasion left axilla, healed punctate lesion right flank.    NEURO: rass -2, on sedation; moved all extremities before intubation and sedation    RADIOLOGY REVIEWED

## 2017-10-21 NOTE — H&P ADULT - ASSESSMENT
84yo F with pmhx of legal blindness, HTN, and ?prediabetes came in with c/o fever since the past 2-3 days. She said this morning she also felt weak so her family bought her in. She is also SOB. Denies chest pain, abdominal pain, or dysuria. Denies nausea or vomiting. She c/o chronic b/l knee pain due to arthritis. Also states having pain in small ulcer below her left arm along with itching since the past couple of days. Denies cough, but does have phlegm production. No other complaints at this time. Family at bedside, they said patient is extremely non compliant and does not take any medication except tylenol for pain. Please call pharmacy ([facility] on [address]) to obtain med rec in AM.     Patient is febrile upto 102 in the ED with lactate of 2.1. No leukocytosis or left shift. UA: dirty. CXR- PNA.    Patient is mostly bedbound. Ambulates rarely with walker due to chronic knee pain. Lives with family at home. Family also serves as HHA. Discussed code status at bedside. Patient is FULL CODE

## 2017-10-21 NOTE — CONSULT NOTE ADULT - ATTENDING COMMENTS
MICU ATTENDING.   86 y/o with PMH of HTN, pre-DM, legally blind, live s at home presented with weakness, fever. In Er altered mental status, and respiratory distress, intubated during RRT, admitted to ICu fro further MX   A/P Hypoxic respiratory failure   Community acquired pneumonia   UTI   Altered mental status  ? GI bleed  HypoNa    MICU  Cont MV, serial ABG, CXR, taper FIO2 as tolerated   Panculture, urine for Legionella ag   Empiric antibiotics  Hydration, moniot r serum Na carefully, avoid rapid correction   Repeat labs, check PT/PTT  Serial CBC, typer and cross, NPO , IV protonix  Gi evaluation  HypoNa w/u  DVT/prophylaxis

## 2017-10-21 NOTE — H&P ADULT - NSHPSOCIALHISTORY_GEN_ALL_CORE
Patient is mostly bedbound. Ambulates rarely with walker due to chronic knee pain. Lives with family at home. Family also serves as HHA.

## 2017-10-21 NOTE — CONSULT NOTE ADULT - ASSESSMENT
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    Patient presents with sepsis likely 2/2 right multifocal pneumonia, legionella a concern given 'normal WBC' with bandemia and hyponatremia and elevated LFT

## 2017-10-21 NOTE — CONSULT NOTE ADULT - PROBLEM SELECTOR RECOMMENDATION 2
2/3 qsofa criteria met (change in mental status, tachypnea) 2/2 right multifocal pneumonia and UTI  lactate normalized after 3700cc fluid  - broad spectrum with zosyn and levaquin  - long  - follow up BCx and UCx  - consent for central line obtained by HCP in case deterioration

## 2017-10-21 NOTE — CONSULT NOTE ADULT - PROBLEM SELECTOR RECOMMENDATION 9
2/2 right multifocal pneumonia, severe, requiring mechanical ventilation  CURB-65 score = 3  concern for legionella given hyponatremia and relative leukopenia with bandemia  s/p vancomycin 1 gm and zosyn in ed  - c/w zosyn; levaquin added to cover atypicals and possible legionella  - follow up urine legionella, strep antigen, sputum culture, procalcitonin, RVP  - follow up BCx

## 2017-10-21 NOTE — H&P ADULT - NSHPPHYSICALEXAM_GEN_ALL_CORE
Vital Signs Last 24 Hrs  T(C): 39.3 (21 Oct 2017 15:00), Max: 39.3 (21 Oct 2017 15:00)  T(F): 102.8 (21 Oct 2017 15:00), Max: 102.8 (21 Oct 2017 15:00)  HR: 81 (21 Oct 2017 14:24) (81 - 81)  BP: 105/82 (21 Oct 2017 14:24) (105/82 - 105/82)  BP(mean): --  RR: 16 (21 Oct 2017 14:24) (16 - 16)  SpO2: 94% (21 Oct 2017 14:24) (94% - 94%)    GENERAL: NAD  HEAD:  Atraumatic, Normocephalic  EYES: closed, sclera clear, legally blind  ENMT: Moist mucous membranes  NECK: Supple  NERVOUS SYSTEM:  Alert & Oriented X3  CHEST/LUNG: Clear to auscultation bilaterally; No rales, rhonchi, wheezing, or rubs  HEART: Regular rate and rhythm; No murmurs, rubs, or gallops  ABDOMEN: Soft, Nontender, Nondistended; Bowel sounds present  EXTREMITIES:  2+ Peripheral Pulses, b/l pitting edema (worse on L).  SKIN: small ulcer below the L arm (near arm pit), left leg and R flank, no purulence

## 2017-10-21 NOTE — CONSULT NOTE ADULT - PROBLEM SELECTOR RECOMMENDATION 4
possibly 2/2 legionella pneumonia  not obstructive picture  possibly 2/2 sepsis, hepatic congestion 2/2 CHF less likely  - follow up viral hepatitis panel in am  - monitor bright red blood via NGT 2/2 ? traumatic intubation, possibly UGIB  - protonix gtt  - cbc q6  - GI consult in am

## 2017-10-21 NOTE — H&P ADULT - ATTENDING COMMENTS
Patient seen and examined in ED with above resident. Patient's history, vitals, labs, imaging studies reviewed. Discussed with resident, agree with note with edits. Plan of care discussed with patient, daughter, and grand daughter at bedside and agrees, all questions answered.   Aimee Garcia MD  10/21/2017

## 2017-10-21 NOTE — ED PROVIDER NOTE - OBJECTIVE STATEMENT
84 y/o female with PMHx of HTN, legally blind presents to the ED c/o fever x today. Pt notes she has been experiencing chills, fever and weakness today, which prompted the visit to the ED. Pt's daughter also notes pt has strong smelling urine. Pt denies HA, abd pain, or any other complaints. NDKA.

## 2017-10-21 NOTE — H&P ADULT - PROBLEM SELECTOR PLAN 1
Multiple sources: UTI, and PNA  - lactate 2.1 on admission, trended to 1.0 after 3700ml NS bolus  - f/u blood cx  - f/u urine cx  - start CTX and azithromycin  - duonebs   - supplemental O2 as needed  - f/u RVP  - Tylenol 650 q6hr around the clock for now  - NS@75ml/hr for now Multiple sources: UTI, and PNA  - lactate 2.1 on admission, trended to 1.0 after 3700ml NS bolus  - f/u blood cx  -U/A positive  - f/u urine cx  - CXR shows RLL PNA  - start IV CTX and azithromycin  - duonebs   - supplemental O2 as needed  - f/u RVP  - Tylenol 650 q6hr around the clock for now  - NS@75ml/hr for now

## 2017-10-21 NOTE — H&P ADULT - NSHPREVIEWOFSYSTEMS_GEN_ALL_CORE
REVIEW OF SYSTEMS:  CONSTITUTIONAL:  fever, No weight loss, or fatigue  EYES: legally blind  ENMT:  No difficulty hearing, tinnitus, vertigo; No sinus or throat pain  NECK: No pain or stiffness  RESPIRATORY: SOB, chills. No wheezing.   CARDIOVASCULAR: b/l LE swelling. (L>R). No chest pain, palpitations, dizziness  GASTROINTESTINAL: No abdominal or epigastric pain. No nausea, vomiting, or hematemesis; No diarrhea or constipation. No melena or hematochezia.  GENITOURINARY: No dysuria, frequency, hematuria, or incontinence  NEUROLOGICAL: No headaches, memory loss, loss of strength, numbness, or tremors  SKIN: small ulcer below the L arm and on the L thigh  MUSCULOSKELETAL: b/l knee pain

## 2017-10-21 NOTE — H&P ADULT - NSHPLABSRESULTS_GEN_ALL_CORE
13.3   4.1   )-----------( 196      ( 21 Oct 2017 14:55 )             42.3   10-21    133<L>  |  100  |  13  ----------------------------<  186<H>  3.9   |  28  |  0.97    Ca    8.4      21 Oct 2017 14:55    TPro  7.2  /  Alb  3.3<L>  /  TBili  0.9  /  DBili  x   /  AST  128<H>  /  ALT  91<H>  /  AlkPhos  170<H>  10-21    < from: Xray Chest 1 View AP/PA (10.21.17 @ 15:36) >    EXAM:  CHEST SINGLE AP OR PA                            PROCEDURE DATE:  10/21/2017          INTERPRETATION:  Portable chest radiograph        CLINICAL INFORMATION:   Short of breath.    TECHNIQUE:  Portable  AP view of the chest was obtained.    COMPARISON: No previous examinations are available for review.    FINDINGS:   The lungs  add multifocal right perihilar lower lobe airspace   consolidations noted. Left lung parenchyma clear. Infectious pneumonia   considered. The  heart is enlarged intransverse diameter. No hilar mass.   Trachea midline.       .         TheVisualized osseous structures are intact.        IMPRESSION:    right perihilar lower lobe patchy airspace consolidation.   Cardiomegaly. Infectious pneumonia should be considered..          < end of copied text >

## 2017-10-21 NOTE — H&P ADULT - PROBLEM SELECTOR PLAN 2
PNA vs PE   - Wells score- 3  - f/u venous doppler  - f/u CTA  - Duonebs  - supplemental O2  - CTX and azithromycin RLL PNA vs PE   - Wells score- 3  - f/u venous doppler  - f/u CTA  - Duonebs  - supplemental O2  - IV CTX and azithromycin

## 2017-10-21 NOTE — CONSULT NOTE ADULT - PROBLEM SELECTOR RECOMMENDATION 7
bleeding from NGT likely 2/2 traumatic intubation as slowing with irrigation  improve score = 3, heparin sq  GI ppx with protonix q12 Bp stable for now  - hold hyzaar in context of sepsis

## 2017-10-21 NOTE — CONSULT NOTE ADULT - PROBLEM SELECTOR RECOMMENDATION 8
bleeding from NGT likely 2/2 traumatic intubation as slowing with irrigation  improve score = 3, heparin sq  GI ppx with protonix q12 bleeding from NGT   improve score =3, will hold off on chemical DVT ppx 2/2 possible GIB  compression boots and protonix gtt

## 2017-10-22 DIAGNOSIS — J18.9 PNEUMONIA, UNSPECIFIED ORGANISM: ICD-10-CM

## 2017-10-22 DIAGNOSIS — K92.2 GASTROINTESTINAL HEMORRHAGE, UNSPECIFIED: ICD-10-CM

## 2017-10-22 LAB
ALBUMIN SERPL ELPH-MCNC: 2.6 G/DL — LOW (ref 3.5–5)
ALBUMIN SERPL ELPH-MCNC: 2.8 G/DL — LOW (ref 3.5–5)
ALP SERPL-CCNC: 145 U/L — HIGH (ref 40–120)
ALP SERPL-CCNC: 154 U/L — HIGH (ref 40–120)
ALT FLD-CCNC: 103 U/L DA — HIGH (ref 10–60)
ALT FLD-CCNC: 95 U/L DA — HIGH (ref 10–60)
ANION GAP SERPL CALC-SCNC: 5 MMOL/L — SIGNIFICANT CHANGE UP (ref 5–17)
ANION GAP SERPL CALC-SCNC: 8 MMOL/L — SIGNIFICANT CHANGE UP (ref 5–17)
AST SERPL-CCNC: 152 U/L — HIGH (ref 10–40)
AST SERPL-CCNC: 171 U/L — HIGH (ref 10–40)
BASE EXCESS BLDA CALC-SCNC: 0.6 MMOL/L — SIGNIFICANT CHANGE UP (ref -2–2)
BASOPHILS # BLD AUTO: 0.3 K/UL — HIGH (ref 0–0.2)
BASOPHILS NFR BLD AUTO: 4.7 % — HIGH (ref 0–2)
BILIRUB SERPL-MCNC: 0.8 MG/DL — SIGNIFICANT CHANGE UP (ref 0.2–1.2)
BILIRUB SERPL-MCNC: 1 MG/DL — SIGNIFICANT CHANGE UP (ref 0.2–1.2)
BLOOD GAS COMMENTS ARTERIAL: SIGNIFICANT CHANGE UP
BUN SERPL-MCNC: 13 MG/DL — SIGNIFICANT CHANGE UP (ref 7–18)
BUN SERPL-MCNC: 13 MG/DL — SIGNIFICANT CHANGE UP (ref 7–18)
CALCIUM SERPL-MCNC: 7.5 MG/DL — LOW (ref 8.4–10.5)
CALCIUM SERPL-MCNC: 7.5 MG/DL — LOW (ref 8.4–10.5)
CHLORIDE SERPL-SCNC: 102 MMOL/L — SIGNIFICANT CHANGE UP (ref 96–108)
CHLORIDE SERPL-SCNC: 102 MMOL/L — SIGNIFICANT CHANGE UP (ref 96–108)
CHOLEST SERPL-MCNC: 158 MG/DL — SIGNIFICANT CHANGE UP (ref 10–199)
CK MB BLD-MCNC: 0.8 % — SIGNIFICANT CHANGE UP (ref 0–3.5)
CK MB BLD-MCNC: 1 % — SIGNIFICANT CHANGE UP (ref 0–3.5)
CK MB BLD-MCNC: 1.2 % — SIGNIFICANT CHANGE UP (ref 0–3.5)
CK MB CFR SERPL CALC: 11 NG/ML — HIGH (ref 0–3.6)
CK MB CFR SERPL CALC: 13.4 NG/ML — HIGH (ref 0–3.6)
CK MB CFR SERPL CALC: 8 NG/ML — HIGH (ref 0–3.6)
CK SERPL-CCNC: 1094 U/L — HIGH (ref 21–215)
CK SERPL-CCNC: 1146 U/L — HIGH (ref 21–215)
CK SERPL-CCNC: 947 U/L — HIGH (ref 21–215)
CO2 SERPL-SCNC: 25 MMOL/L — SIGNIFICANT CHANGE UP (ref 22–31)
CO2 SERPL-SCNC: 28 MMOL/L — SIGNIFICANT CHANGE UP (ref 22–31)
CREAT SERPL-MCNC: 0.81 MG/DL — SIGNIFICANT CHANGE UP (ref 0.5–1.3)
CREAT SERPL-MCNC: 0.87 MG/DL — SIGNIFICANT CHANGE UP (ref 0.5–1.3)
EOSINOPHIL # BLD AUTO: 0.1 K/UL — SIGNIFICANT CHANGE UP (ref 0–0.5)
EOSINOPHIL NFR BLD AUTO: 1 % — SIGNIFICANT CHANGE UP (ref 0–6)
GLUCOSE SERPL-MCNC: 154 MG/DL — HIGH (ref 70–99)
GLUCOSE SERPL-MCNC: 203 MG/DL — HIGH (ref 70–99)
GRAM STN FLD: SIGNIFICANT CHANGE UP
HBV CORE AB SER-ACNC: REACTIVE
HBV SURFACE AB SER-ACNC: REACTIVE
HBV SURFACE AG SER-ACNC: SIGNIFICANT CHANGE UP
HCO3 BLDA-SCNC: 25 MMOL/L — SIGNIFICANT CHANGE UP (ref 23–27)
HCT VFR BLD CALC: 35.9 % — SIGNIFICANT CHANGE UP (ref 34.5–45)
HCT VFR BLD CALC: 39.8 % — SIGNIFICANT CHANGE UP (ref 34.5–45)
HCV AB S/CO SERPL IA: 0.11 S/CO — SIGNIFICANT CHANGE UP
HCV AB SERPL-IMP: SIGNIFICANT CHANGE UP
HDLC SERPL-MCNC: 32 MG/DL — LOW (ref 40–125)
HGB BLD-MCNC: 11.6 G/DL — SIGNIFICANT CHANGE UP (ref 11.5–15.5)
HGB BLD-MCNC: 13 G/DL — SIGNIFICANT CHANGE UP (ref 11.5–15.5)
HOROWITZ INDEX BLDA+IHG-RTO: 50 — SIGNIFICANT CHANGE UP
LACTATE SERPL-SCNC: 0.9 MMOL/L — SIGNIFICANT CHANGE UP (ref 0.7–2)
LEGIONELLA AG UR QL: NEGATIVE — SIGNIFICANT CHANGE UP
LIPID PNL WITH DIRECT LDL SERPL: 83 MG/DL — SIGNIFICANT CHANGE UP
LYMPHOCYTES # BLD AUTO: 0.8 K/UL — LOW (ref 1–3.3)
LYMPHOCYTES # BLD AUTO: 15.2 % — SIGNIFICANT CHANGE UP (ref 13–44)
MAGNESIUM SERPL-MCNC: 1.7 MG/DL — SIGNIFICANT CHANGE UP (ref 1.6–2.6)
MCHC RBC-ENTMCNC: 30.9 PG — SIGNIFICANT CHANGE UP (ref 27–34)
MCHC RBC-ENTMCNC: 31.4 PG — SIGNIFICANT CHANGE UP (ref 27–34)
MCHC RBC-ENTMCNC: 32.3 GM/DL — SIGNIFICANT CHANGE UP (ref 32–36)
MCHC RBC-ENTMCNC: 32.7 GM/DL — SIGNIFICANT CHANGE UP (ref 32–36)
MCV RBC AUTO: 95.7 FL — SIGNIFICANT CHANGE UP (ref 80–100)
MCV RBC AUTO: 96.2 FL — SIGNIFICANT CHANGE UP (ref 80–100)
MONOCYTES # BLD AUTO: 0.7 K/UL — SIGNIFICANT CHANGE UP (ref 0–0.9)
MONOCYTES NFR BLD AUTO: 12.9 % — SIGNIFICANT CHANGE UP (ref 2–14)
NEUTROPHILS # BLD AUTO: 3.5 K/UL — SIGNIFICANT CHANGE UP (ref 1.8–7.4)
NEUTROPHILS NFR BLD AUTO: 66.2 % — SIGNIFICANT CHANGE UP (ref 43–77)
NT-PROBNP SERPL-SCNC: 972 PG/ML — HIGH (ref 0–450)
PCO2 BLDA: 44 MMHG — SIGNIFICANT CHANGE UP (ref 32–46)
PH BLDA: 7.38 — SIGNIFICANT CHANGE UP (ref 7.35–7.45)
PHOSPHATE SERPL-MCNC: 2.7 MG/DL — SIGNIFICANT CHANGE UP (ref 2.5–4.5)
PLATELET # BLD AUTO: 149 K/UL — LOW (ref 150–400)
PLATELET # BLD AUTO: 153 K/UL — SIGNIFICANT CHANGE UP (ref 150–400)
PO2 BLDA: 147 MMHG — HIGH (ref 74–108)
POTASSIUM SERPL-MCNC: 3.8 MMOL/L — SIGNIFICANT CHANGE UP (ref 3.5–5.3)
POTASSIUM SERPL-MCNC: 3.9 MMOL/L — SIGNIFICANT CHANGE UP (ref 3.5–5.3)
POTASSIUM SERPL-SCNC: 3.8 MMOL/L — SIGNIFICANT CHANGE UP (ref 3.5–5.3)
POTASSIUM SERPL-SCNC: 3.9 MMOL/L — SIGNIFICANT CHANGE UP (ref 3.5–5.3)
PROT SERPL-MCNC: 5.8 G/DL — LOW (ref 6–8.3)
PROT SERPL-MCNC: 6.2 G/DL — SIGNIFICANT CHANGE UP (ref 6–8.3)
RBC # BLD: 3.75 M/UL — LOW (ref 3.8–5.2)
RBC # BLD: 4.14 M/UL — SIGNIFICANT CHANGE UP (ref 3.8–5.2)
RBC # FLD: 11.8 % — SIGNIFICANT CHANGE UP (ref 10.3–14.5)
RBC # FLD: 12 % — SIGNIFICANT CHANGE UP (ref 10.3–14.5)
SAO2 % BLDA: 99 % — HIGH (ref 92–96)
SODIUM SERPL-SCNC: 135 MMOL/L — SIGNIFICANT CHANGE UP (ref 135–145)
SODIUM SERPL-SCNC: 135 MMOL/L — SIGNIFICANT CHANGE UP (ref 135–145)
SPECIMEN SOURCE: SIGNIFICANT CHANGE UP
TOTAL CHOLESTEROL/HDL RATIO MEASUREMENT: 4.9 RATIO — SIGNIFICANT CHANGE UP (ref 3.3–7.1)
TRIGL SERPL-MCNC: 215 MG/DL — HIGH (ref 10–149)
TROPONIN I SERPL-MCNC: 0.58 NG/ML — HIGH (ref 0–0.04)
TROPONIN I SERPL-MCNC: 1.26 NG/ML — HIGH (ref 0–0.04)
TROPONIN I SERPL-MCNC: 1.47 NG/ML — HIGH (ref 0–0.04)
TROPONIN I SERPL-MCNC: 1.58 NG/ML — HIGH (ref 0–0.04)
WBC # BLD: 5 K/UL — SIGNIFICANT CHANGE UP (ref 3.8–10.5)
WBC # BLD: 5.3 K/UL — SIGNIFICANT CHANGE UP (ref 3.8–10.5)
WBC # FLD AUTO: 5 K/UL — SIGNIFICANT CHANGE UP (ref 3.8–10.5)
WBC # FLD AUTO: 5.3 K/UL — SIGNIFICANT CHANGE UP (ref 3.8–10.5)

## 2017-10-22 PROCEDURE — 99222 1ST HOSP IP/OBS MODERATE 55: CPT

## 2017-10-22 PROCEDURE — 71010: CPT | Mod: 26

## 2017-10-22 RX ORDER — DOCUSATE SODIUM 100 MG
100 CAPSULE ORAL
Qty: 0 | Refills: 0 | Status: DISCONTINUED | OUTPATIENT
Start: 2017-10-22 | End: 2017-10-23

## 2017-10-22 RX ORDER — SENNA PLUS 8.6 MG/1
2 TABLET ORAL AT BEDTIME
Qty: 0 | Refills: 0 | Status: DISCONTINUED | OUTPATIENT
Start: 2017-10-22 | End: 2017-10-27

## 2017-10-22 RX ORDER — DOCUSATE SODIUM 100 MG
100 CAPSULE ORAL
Qty: 0 | Refills: 0 | Status: DISCONTINUED | OUTPATIENT
Start: 2017-10-22 | End: 2017-10-22

## 2017-10-22 RX ORDER — FENTANYL CITRATE 50 UG/ML
0.5 INJECTION INTRAVENOUS
Qty: 2500 | Refills: 0 | Status: DISCONTINUED | OUTPATIENT
Start: 2017-10-22 | End: 2017-10-22

## 2017-10-22 RX ORDER — ASPIRIN/CALCIUM CARB/MAGNESIUM 324 MG
81 TABLET ORAL DAILY
Qty: 0 | Refills: 0 | Status: DISCONTINUED | OUTPATIENT
Start: 2017-10-22 | End: 2017-10-22

## 2017-10-22 RX ORDER — SODIUM CHLORIDE 9 MG/ML
1000 INJECTION INTRAMUSCULAR; INTRAVENOUS; SUBCUTANEOUS ONCE
Qty: 0 | Refills: 0 | Status: COMPLETED | OUTPATIENT
Start: 2017-10-22 | End: 2017-10-22

## 2017-10-22 RX ORDER — ALBUTEROL 90 UG/1
2 AEROSOL, METERED ORAL EVERY 6 HOURS
Qty: 0 | Refills: 0 | Status: DISCONTINUED | OUTPATIENT
Start: 2017-10-22 | End: 2017-10-23

## 2017-10-22 RX ORDER — ACETAMINOPHEN 500 MG
650 TABLET ORAL EVERY 6 HOURS
Qty: 0 | Refills: 0 | Status: DISCONTINUED | OUTPATIENT
Start: 2017-10-22 | End: 2017-10-23

## 2017-10-22 RX ORDER — IPRATROPIUM BROMIDE 0.2 MG/ML
1 SOLUTION, NON-ORAL INHALATION EVERY 6 HOURS
Qty: 0 | Refills: 0 | Status: DISCONTINUED | OUTPATIENT
Start: 2017-10-22 | End: 2017-10-23

## 2017-10-22 RX ORDER — ACETAMINOPHEN 500 MG
650 TABLET ORAL EVERY 6 HOURS
Qty: 0 | Refills: 0 | Status: DISCONTINUED | OUTPATIENT
Start: 2017-10-22 | End: 2017-10-27

## 2017-10-22 RX ORDER — HEPARIN SODIUM 5000 [USP'U]/ML
5000 INJECTION INTRAVENOUS; SUBCUTANEOUS EVERY 8 HOURS
Qty: 0 | Refills: 0 | Status: DISCONTINUED | OUTPATIENT
Start: 2017-10-22 | End: 2017-10-27

## 2017-10-22 RX ORDER — SODIUM CHLORIDE 9 MG/ML
1000 INJECTION INTRAMUSCULAR; INTRAVENOUS; SUBCUTANEOUS
Qty: 0 | Refills: 0 | Status: DISCONTINUED | OUTPATIENT
Start: 2017-10-22 | End: 2017-10-22

## 2017-10-22 RX ORDER — PANTOPRAZOLE SODIUM 20 MG/1
40 TABLET, DELAYED RELEASE ORAL DAILY
Qty: 0 | Refills: 0 | Status: DISCONTINUED | OUTPATIENT
Start: 2017-10-22 | End: 2017-10-23

## 2017-10-22 RX ORDER — PROPOFOL 10 MG/ML
5 INJECTION, EMULSION INTRAVENOUS
Qty: 1000 | Refills: 0 | Status: DISCONTINUED | OUTPATIENT
Start: 2017-10-22 | End: 2017-10-22

## 2017-10-22 RX ORDER — PROPOFOL 10 MG/ML
50 INJECTION, EMULSION INTRAVENOUS
Qty: 1000 | Refills: 0 | Status: DISCONTINUED | OUTPATIENT
Start: 2017-10-22 | End: 2017-10-22

## 2017-10-22 RX ORDER — POLYETHYLENE GLYCOL 3350 17 G/17G
17 POWDER, FOR SOLUTION ORAL AT BEDTIME
Qty: 0 | Refills: 0 | Status: DISCONTINUED | OUTPATIENT
Start: 2017-10-22 | End: 2017-10-27

## 2017-10-22 RX ORDER — SODIUM CHLORIDE 9 MG/ML
1000 INJECTION INTRAMUSCULAR; INTRAVENOUS; SUBCUTANEOUS
Qty: 0 | Refills: 0 | Status: DISCONTINUED | OUTPATIENT
Start: 2017-10-22 | End: 2017-10-23

## 2017-10-22 RX ORDER — SODIUM CHLORIDE 9 MG/ML
500 INJECTION INTRAMUSCULAR; INTRAVENOUS; SUBCUTANEOUS ONCE
Qty: 0 | Refills: 0 | Status: COMPLETED | OUTPATIENT
Start: 2017-10-22 | End: 2017-10-22

## 2017-10-22 RX ADMIN — PANTOPRAZOLE SODIUM 10 MG/HR: 20 TABLET, DELAYED RELEASE ORAL at 00:47

## 2017-10-22 RX ADMIN — PANTOPRAZOLE SODIUM 40 MILLIGRAM(S): 20 TABLET, DELAYED RELEASE ORAL at 12:20

## 2017-10-22 RX ADMIN — Medication 650 MILLIGRAM(S): at 17:50

## 2017-10-22 RX ADMIN — PIPERACILLIN AND TAZOBACTAM 25 GRAM(S): 4; .5 INJECTION, POWDER, LYOPHILIZED, FOR SOLUTION INTRAVENOUS at 21:34

## 2017-10-22 RX ADMIN — SODIUM CHLORIDE 75 MILLILITER(S): 9 INJECTION INTRAMUSCULAR; INTRAVENOUS; SUBCUTANEOUS at 09:53

## 2017-10-22 RX ADMIN — HEPARIN SODIUM 5000 UNIT(S): 5000 INJECTION INTRAVENOUS; SUBCUTANEOUS at 21:35

## 2017-10-22 RX ADMIN — Medication 1 PUFF(S): at 20:10

## 2017-10-22 RX ADMIN — ALBUTEROL 2 PUFF(S): 90 AEROSOL, METERED ORAL at 14:47

## 2017-10-22 RX ADMIN — SODIUM CHLORIDE 75 MILLILITER(S): 9 INJECTION INTRAMUSCULAR; INTRAVENOUS; SUBCUTANEOUS at 09:55

## 2017-10-22 RX ADMIN — HEPARIN SODIUM 5000 UNIT(S): 5000 INJECTION INTRAVENOUS; SUBCUTANEOUS at 14:48

## 2017-10-22 RX ADMIN — POLYETHYLENE GLYCOL 3350 17 GRAM(S): 17 POWDER, FOR SOLUTION ORAL at 21:35

## 2017-10-22 RX ADMIN — Medication 1 PUFF(S): at 14:47

## 2017-10-22 RX ADMIN — ALBUTEROL 2 PUFF(S): 90 AEROSOL, METERED ORAL at 08:55

## 2017-10-22 RX ADMIN — PIPERACILLIN AND TAZOBACTAM 25 GRAM(S): 4; .5 INJECTION, POWDER, LYOPHILIZED, FOR SOLUTION INTRAVENOUS at 14:48

## 2017-10-22 RX ADMIN — SODIUM CHLORIDE 100 MILLILITER(S): 9 INJECTION INTRAMUSCULAR; INTRAVENOUS; SUBCUTANEOUS at 00:41

## 2017-10-22 RX ADMIN — SODIUM CHLORIDE 1000 MILLILITER(S): 9 INJECTION INTRAMUSCULAR; INTRAVENOUS; SUBCUTANEOUS at 03:30

## 2017-10-22 RX ADMIN — SENNA PLUS 2 TABLET(S): 8.6 TABLET ORAL at 21:35

## 2017-10-22 RX ADMIN — Medication 1 PUFF(S): at 08:55

## 2017-10-22 RX ADMIN — Medication 650 MILLIGRAM(S): at 18:20

## 2017-10-22 RX ADMIN — Medication 650 MILLIGRAM(S): at 01:30

## 2017-10-22 RX ADMIN — PROPOFOL 3.74 MICROGRAM(S)/KG/MIN: 10 INJECTION, EMULSION INTRAVENOUS at 06:06

## 2017-10-22 RX ADMIN — PIPERACILLIN AND TAZOBACTAM 25 GRAM(S): 4; .5 INJECTION, POWDER, LYOPHILIZED, FOR SOLUTION INTRAVENOUS at 06:09

## 2017-10-22 RX ADMIN — SODIUM CHLORIDE 1000 MILLILITER(S): 9 INJECTION INTRAMUSCULAR; INTRAVENOUS; SUBCUTANEOUS at 06:06

## 2017-10-22 NOTE — PROGRESS NOTE ADULT - PROBLEM SELECTOR PLAN 1
s/p intubation last night 10/21/2017, continue mechanical ventilation, sedation as per ICU team protocol

## 2017-10-22 NOTE — CONSULT NOTE ADULT - ASSESSMENT
85 year old female with Sepsis secondary to UTI and PNA s.p intubation with concern for UGI . No evidence of active GI bleeding at this time. No episodes of melena hematochezia and stable H/H.

## 2017-10-22 NOTE — PROGRESS NOTE ADULT - SUBJECTIVE AND OBJECTIVE BOX
INTERVAL HPI/OVERNIGHT EVENTS: ppi drip, troponemia, tapered off fentanl drip, 1L bolus    PRESSORS: [ ] YES [x ] NO  WHICH:    ANTIBIOTICS:  levaquin                DATE STARTED: 10/21  ANTIBIOTICS: zosyn                  DATE STARTED: 10/21    Antimicrobial:  levoFLOXacin IVPB 500 milliGRAM(s) IV Intermittent every 24 hours  levoFLOXacin IVPB      piperacillin/tazobactam IVPB. 3.375 Gram(s) IV Intermittent every 8 hours    Pulmonary:  ALBUTerol    90 MICROgram(s) HFA Inhaler 2 Puff(s) Inhalation every 6 hours PRN  ipratropium 17 MICROgram(s) HFA Inhaler 1 Puff(s) Inhalation every 6 hours  tiotropium 18 MICROgram(s) Capsule 1 Capsule(s) Inhalation daily    Other:  acetaminophen  Suppository 650 milliGRAM(s) Rectal every 6 hours PRN  fentaNYL   Infusion 0.5 MICROgram(s)/kG/Hr IV Continuous <Continuous>  pantoprazole Infusion 8 mG/Hr IV Continuous <Continuous>  propofol Infusion 50 MICROgram(s)/kG/Min IV Continuous <Continuous>  sodium chloride 0.9%. 1000 milliLiter(s) IV Continuous <Continuous>    acetaminophen  Suppository 650 milliGRAM(s) Rectal every 6 hours PRN  ALBUTerol    90 MICROgram(s) HFA Inhaler 2 Puff(s) Inhalation every 6 hours PRN  fentaNYL   Infusion 0.5 MICROgram(s)/kG/Hr IV Continuous <Continuous>  ipratropium 17 MICROgram(s) HFA Inhaler 1 Puff(s) Inhalation every 6 hours  levoFLOXacin IVPB 500 milliGRAM(s) IV Intermittent every 24 hours  levoFLOXacin IVPB      pantoprazole Infusion 8 mG/Hr IV Continuous <Continuous>  piperacillin/tazobactam IVPB. 3.375 Gram(s) IV Intermittent every 8 hours  propofol Infusion 50 MICROgram(s)/kG/Min IV Continuous <Continuous>  sodium chloride 0.9%. 1000 milliLiter(s) IV Continuous <Continuous>  tiotropium 18 MICROgram(s) Capsule 1 Capsule(s) Inhalation daily    Drug Dosing Weight  Height (cm): 175.26 (21 Oct 2017 14:24)  Weight (kg): 124.7 (21 Oct 2017 14:24)  BMI (kg/m2): 40.6 (21 Oct 2017 14:24)  BSA (m2): 2.36 (21 Oct 2017 14:24)    CENTRAL LINE: [ ] YES [ x] NO  LOCATION:   DATE INSERTED:  REMOVE: [ ] YES [ ] NO  EXPLAIN:    LONG: [ x] YES [ ] NO    DATE INSERTED: 10/21  REMOVE:  [ ] YES [ ] NO  EXPLAIN:    A-LINE:  [ ] YES [x ] NO  LOCATION:   DATE INSERTED:  REMOVE:  [ ] YES [ ] NO  EXPLAIN:    PMH -reviewed admission note, no change since admission    ICU Vital Signs Last 24 Hrs  T(C): 38.3 (21 Oct 2017 23:42), Max: 39.3 (21 Oct 2017 15:00)  T(F): 100.9 (21 Oct 2017 23:42), Max: 102.8 (21 Oct 2017 15:00)  HR: 74 (22 Oct 2017 03:00) (66 - 116)  BP: 110/51 (22 Oct 2017 03:00) (90/51 - 180/120)  BP(mean): 59 (22 Oct 2017 02:00) (59 - 72)  ABP: --  ABP(mean): --  RR: 22 (22 Oct 2017 03:00) (14 - 30)  SpO2: 98% (22 Oct 2017 03:00) (80% - 100%)    ABG - ( 21 Oct 2017 23:32 )  pH: 7.36  /  pCO2: 41    /  pO2: 334   / HCO3: 23    / Base Excess: -1.8  /  SaO2: results >99.0     Mode: AC/ CMV (Assist Control/ Continuous Mandatory Ventilation)  RR (machine): 14  TV (machine): 500  FiO2: 50  PEEP: 5  ITime: 1  MAP: 14  PIP: 22    PHYSICAL EXAM:    GENERAL:   HEAD: atraumatic, normocephalic   EYES: PERRLA, white sclera.   ENMT: nasal mucosa-moist, Oral cavity- no exudate  NECK: supple, JVD/ no JVD  SKIN: warm, dry   CHEST/LUNG:  No Chest deformity , no chest tenderness. bilateral breath sounds,no  adventitious sounds  HEART: RRR, no m/r/g   ABDOMEN: soft, nontender, nondistended; bowel sounds.  : long catheter.  EXTREMITIES: no peripheral edema, no cyanosis, no clubbing.  NEURO: AA0X , mood/ affect-, no focal neuro deficits    LABS:  CBC Full  -  ( 22 Oct 2017 00:29 )  WBC Count : 5.0 K/uL  Hemoglobin : 13.0 g/dL  Hematocrit : 39.8 %  Platelet Count - Automated : 149 K/uL  Mean Cell Volume : 96.2 fl  Mean Cell Hemoglobin : 31.4 pg  Mean Cell Hemoglobin Concentration : 32.7 gm/dL  Auto Neutrophil # : x  Auto Lymphocyte # : x  Auto Monocyte # : x  Auto Eosinophil # : x  Auto Basophil # : x  Auto Neutrophil % : x  Auto Lymphocyte % : x  Auto Monocyte % : x  Auto Eosinophil % : x  Auto Basophil % : x    10-21    135  |  102  |  13  ----------------------------<  203<H>  3.8   |  25  |  0.87    Ca    7.5<L>      21 Oct 2017 23:44    TPro  6.2  /  Alb  2.8<L>  /  TBili  1.0  /  DBili  x   /  AST  171<H>  /  ALT  103<H>  /  AlkPhos  154<H>  10-21    PT/INR - ( 21 Oct 2017 23:44 )   PT: 12.6 sec;   INR: 1.15 ratio       PTT - ( 21 Oct 2017 23:44 )  PTT:30.2 sec  Urinalysis Basic - ( 21 Oct 2017 16:48 )    Color: Yellow / Appearance: Slightly Turbid / S.010 / pH: x  Gluc: x / Ketone: Negative  / Bili: Negative / Urobili: 1   Blood: x / Protein: 30 mg/dL / Nitrite: Positive   Leuk Esterase: Moderate / RBC: 2-5 /HPF / WBC >50 /HPF   Sq Epi: x / Non Sq Epi: Few /HPF / Bacteria: Many /HPF    RADIOLOGY & ADDITIONAL STUDIES REVIEWED:      < from: Xray Chest 1 View AP/PA (10.21.17 @ 15:36) >  IMPRESSION:    right perihilar lower lobe patchy airspace consolidation.   Cardiomegaly. Infectious pneumonia should be considered..          < end of copied text >    < from: US Duplex Venous Lower Ext Complete, Bilateral (10.21.17 @ 21:27) >  IMPRESSION:  No evidence of deep vein thrombosis in the visualized veins   of either leg. Poor visualization of bilateral mid to distal femoral and   calf veins.    < end of copied text >    [ ]GOALS OF CARE DISCUSSION WITH PATIENT/FAMILY/PROXY: full code    CRITICAL CARE TIME SPENT: 35 minutes

## 2017-10-22 NOTE — PROGRESS NOTE ADULT - PROBLEM SELECTOR PLAN 3
- currently on broad spectrum antibiotics with zosyn and levaquin  - Is s/p long  - follow up  UCx  - ID following

## 2017-10-22 NOTE — PROGRESS NOTE ADULT - SUBJECTIVE AND OBJECTIVE BOX
Medicine attending note:    Patient is a 85y old  Female who presents with a chief complaint of Fever (21 Oct 2017 19:30)    Overnight events: Patient was intubated secondary to hypoxia and moved to ICU    Unable to obtain ROS as patient is sedated, intubated.   Granddaughter at bedside    MEDICATIONS  (STANDING):  heparin  Injectable 5000 Unit(s) SubCutaneous every 8 hours  ipratropium 17 MICROgram(s) HFA Inhaler 1 Puff(s) Inhalation every 6 hours  levoFLOXacin IVPB 500 milliGRAM(s) IV Intermittent every 24 hours  levoFLOXacin IVPB      pantoprazole  Injectable 40 milliGRAM(s) IV Push daily  piperacillin/tazobactam IVPB. 3.375 Gram(s) IV Intermittent every 8 hours  propofol Infusion 5 MICROgram(s)/kG/Min (3.741 mL/Hr) IV Continuous <Continuous>  sodium chloride 0.9%. 1000 milliLiter(s) (75 mL/Hr) IV Continuous <Continuous>  tiotropium 18 MICROgram(s) Capsule 1 Capsule(s) Inhalation daily    MEDICATIONS  (PRN):  acetaminophen  Suppository 650 milliGRAM(s) Rectal every 6 hours PRN For Temp greater than 38 C (100.4 F)  ALBUTerol    90 MICROgram(s) HFA Inhaler 2 Puff(s) Inhalation every 6 hours PRN Shortness of Breath and/or Wheezing         PHYSICAL EXAM:    T(C): 37.2 (10-22-17 @ 07:00), Max: 39.3 (10-21-17 @ 15:00)  HR: 57 (10-22-17 @ 13:00) (54 - 116)  BP: 115/61 (10-22-17 @ 13:00) (90/51 - 180/120)  RR: 17 (10-22-17 @ 13:00) (14 - 30)  SpO2: 100% (10-22-17 @ 13:00) (80% - 100%)    I&O's Summary    21 Oct 2017 07:01  -  22 Oct 2017 07:00  --------------------------------------------------------  IN: 2759.6 mL / OUT: 385 mL / NET: 2374.6 mL    22 Oct 2017 07:01  -  22 Oct 2017 13:07  --------------------------------------------------------  IN: 338.5 mL / OUT: 350 mL / NET: -11.5 mL            GENERAL: Elderly female, NAD, sedated, intubated, well-groomed, well-developed  HEAD:  Atraumatic, Normocephalic  EYES:  conjunctiva and sclera clear  ENMT: Moist mucous membranes, No lesions  NECK: Supple, No JVD, Normal thyroid  NERVOUS SYSTEM:  sedated  CHEST/LUNG: Decreased breath sounds on right side, otherwise clear to auscultation anteriorly; No rales, rhonchi, wheezing, or rubs  HEART: Regular rate and rhythm; No murmurs, rubs, or gallops  ABDOMEN: Soft, Nontender, Nondistended; Bowel sounds present  EXTREMITIES:  2+ Peripheral Pulses, well healed surgical scar right foot, No clubbing, cyanosis,+ edema  LYMPH: No lymphadenopathy noted        LABS:                        11.6   5.3   )-----------( 153      ( 22 Oct 2017 03:48 )             35.9     10    135  |  102  |  13  ----------------------------<  154<H>  3.9   |  28  |  0.81    Ca    7.5<L>      22 Oct 2017 03:48  Phos  2.7     10-22  Mg     1.7     10-22    TPro  5.8<L>  /  Alb  2.6<L>  /  TBili  0.8  /  DBili  x   /  AST  152<H>  /  ALT  95<H>  /  AlkPhos  145<H>  1022    PT/INR - ( 21 Oct 2017 23:44 )   PT: 12.6 sec;   INR: 1.15 ratio         PTT - ( 21 Oct 2017 23:44 )  PTT:30.2 sec  Urinalysis Basic - ( 21 Oct 2017 16:48 )    Color: Yellow / Appearance: Slightly Turbid / S.010 / pH: x  Gluc: x / Ketone: Negative  / Bili: Negative / Urobili: 1   Blood: x / Protein: 30 mg/dL / Nitrite: Positive   Leuk Esterase: Moderate / RBC: 2-5 /HPF / WBC >50 /HPF   Sq Epi: x / Non Sq Epi: Few /HPF / Bacteria: Many /HPF      CAPILLARY BLOOD GLUCOSE  167 (21 Oct 2017 15:00)      POCT Blood Glucose.: 147 mg/dL (21 Oct 2017 21:43)  POCT Blood Glucose.: 167 mg/dL (21 Oct 2017 14:57)    ABG - ( 22 Oct 2017 05:11 )  pH: 7.38  /  pCO2: 44    /  pO2: 147   / HCO3: 25    / Base Excess: 0.6   /  SaO2: 99          Urinalysis Basic - ( 21 Oct 2017 16:48 )    Color: Yellow / Appearance: Slightly Turbid / S.010 / pH: x  Gluc: x / Ketone: Negative  / Bili: Negative / Urobili: 1   Blood: x / Protein: 30 mg/dL / Nitrite: Positive   Leuk Esterase: Moderate / RBC: 2-5 /HPF / WBC >50 /HPF   Sq Epi: x / Non Sq Epi: Few /HPF / Bacteria: Many /HPF            RADIOLOGY & ADDITIONAL TESTS:    < from: Xray Chest 1 View AP/PA (10.21.17 @ 15:36) >  IMPRESSION:    right perihilar lower lobe patchy airspace consolidation.   Cardiomegaly. Infectious pneumonia should be considered..          < end of copied text >    < from: US Duplex Venous Lower Ext Complete, Bilateral (10.21.17 @ 21:27) >  IMPRESSION:  No evidence of deep vein thrombosis in the visualized veins   of either leg. Poor visualization of bilateral mid to distal femoral and   calf veins.    < end of copied text >    [x ]GOALS OF CARE DISCUSSION WITH PATIENT/FAMILY/PROXY: full code    Imaging Personally Reviewed:  [x ] YES  [ ] NO    Consultant(s) Notes Reviewed:  [x] YES  [ ] NO    Care Discussed with Consultants/Other Providers [x] YES  [ ] NO

## 2017-10-22 NOTE — CONSULT NOTE ADULT - PROBLEM SELECTOR RECOMMENDATION 9
Suppotive mangement for now   IV PPI pushes   Monitor CBC   Transfuse to a gaol hemoglobin 7-8 and to support HD if needed   No GI intervention at this time, however will continue to monitor along with you and change plan accordingly. Supportive mange ment for now   IV PPI pushes   Monitor CBC   Transfuse to a gaol hemoglobin 7-8 and to support HD if needed   No GI intervention at this time, however will continue to monitor along with you and change plan accordingly.

## 2017-10-22 NOTE — PROGRESS NOTE ADULT - ATTENDING COMMENTS
86yo female from home, walks with walker, lives with daughter and granddaughter, being cared for by family, PMH HTN, 'pre-diabetes,' right knee OA, legally blind 2/2 glaucoma/cataracts brought to ED by family 2/2 1-2 days of weakness, subjective fever, chills.    Patient presents with sepsis likely 2/2 right multifocal pneumonia, legionella a concern given 'normal WBC' with bandemia and hyponatremia and elevated LFT.      Problem/Recommendation - 1:  Problem: Acute respiratory failure with hypoxia. Recommendation: 2/2 right multifocal pneumonia, severe, requiring mechanical ventilation  CURB-65 score = 3  concern for legionella given hyponatremia and relative leukopenia with bandemia  s/p vancomycin 1 gm and zosyn in ed  - c/w zosyn; levaquin added to cover atypicals and possible legionella  - follow up urine legionella, strep antigen, sputum culture, procalcitonin, RVP  - follow up BCx.     Problem/Recommendation - 2:  ·  Problem: Sepsis, due to unspecified organism.  Recommendation: 2/3 qsofa criteria met (change in mental status, tachypnea) 2/2 right multifocal pneumonia and UTI  lactate normalized after 3700cc fluid  - broad spectrum with zosyn and levaquin  - long  - follow up BCx and UCx  - consent for central line obtained by HCP in case deterioration.      Problem/Recommendation - 3:  ·  Problem: Urinary tract infection without hematuria, site unspecified.  Recommendation: likely contributing to sepsis  - on zosyn  - follow up UCx.      Problem/Recommendation - 4:  ·  Problem: Gastrointestinal hemorrhage, unspecified gastrointestinal hemorrhage type.  Recommendation: bright red blood via NGT 2/2 ? traumatic intubation, possibly UGIB  - protonix gtt  - cbc q6  - GI consult Dr. Cedillo will consider EGD after patient is stable     Problem/Recommendation - 5:  ·  Problem: Transaminitis.  Recommendation: possibly 2/2 legionella pneumonia  not obstructive picture  possibly 2/2 sepsis, hepatic congestion 2/2 CHF less likely  - follow up viral hepatitis panel in am  - monitor.      Problem/Recommendation - 6:  Problem: Hyponatremia. Recommendation: possibly 2/2 legionella pneumonia  hypovolemic hyponatremia  - NS IVF  - check urine osm and Na.     Problem/Recommendation - 7:  Problem: History of hypertension. Recommendation: Bp stable for now  - hold hyzaar in context of sepsis.     Problem/Recommendation - 8:  Problem: Need for prophylactic measure. Recommendation: bleeding from NGT   improve score =3, will hold off on chemical DVT ppx 2/2 possible GIB  compression boots and protonix gtt.

## 2017-10-22 NOTE — CONSULT NOTE ADULT - SUBJECTIVE AND OBJECTIVE BOX
HPI:( asp per chart)  86yo F with pmhx of legal blindness, HTN, and ?prediabetes came in with c/o fever since the past 2-3 days. She said this morning she also felt weak so her family bought her in. She is also SOB. Denies chest pain, abdominal pain, or dysuria. Denies nausea or vomiting. She c/o chronic b/l knee pain due to arthritis. Also states having pain in small ulcer below her left arm along with itching since the past couple of days. Denies cough, but does have phlegm production. No other complaints at this time. Pt was initially given ceftriaxone and azithro for covering CAP and UTI.   pt was code sepsis in ER. she had WBC 4k with 13% bands, elevated transaminases and mildly elevated lactate. Patient had RR on floor for respiratory distress and AMS> she had hypoxia to 75% and was intubated by anesthesia and now in ICU.   ABX was broadened to zosyn and levofloxacin    Patient is febrile upto 102 in the ED with lactate of 2.1. No leukocytosis or left shift. UA: dirty. CXR- PNA.          PAST MEDICAL & SURGICAL HISTORY:  Legally blind  Arthritis  History of hypertension      Allergies    No Known Allergies    Intolerances        MEDICATIONS  (STANDING):  heparin  Injectable 5000 Unit(s) SubCutaneous every 8 hours  ipratropium 17 MICROgram(s) HFA Inhaler 1 Puff(s) Inhalation every 6 hours  levoFLOXacin IVPB 500 milliGRAM(s) IV Intermittent every 24 hours  levoFLOXacin IVPB      pantoprazole  Injectable 40 milliGRAM(s) IV Push daily  piperacillin/tazobactam IVPB. 3.375 Gram(s) IV Intermittent every 8 hours  propofol Infusion 5 MICROgram(s)/kG/Min (3.741 mL/Hr) IV Continuous <Continuous>  sodium chloride 0.9%. 1000 milliLiter(s) (75 mL/Hr) IV Continuous <Continuous>  tiotropium 18 MICROgram(s) Capsule 1 Capsule(s) Inhalation daily      MEDICATIONS  (PRN):  acetaminophen  Suppository 650 milliGRAM(s) Rectal every 6 hours PRN For Temp greater than 38 C (100.4 F)  ALBUTerol    90 MICROgram(s) HFA Inhaler 2 Puff(s) Inhalation every 6 hours PRN Shortness of Breath and/or Wheezing   Medications up to date at time of exam.    Medications up to date at time of exam.    FAMILY HISTORY: no pertinent.       SOCIAL HISTORY: Patient is mostly bedbound. Ambulates rarely with walker due to chronic knee pain. Lives with family at home. Family also serves as HHA.   Smoking History:   Living Condition:  Work History:   Travel History: denies recent travel  Illicit Substance Use: denies  Alcohol Use: denies  Pets:    REVIEW OF SYSTEMS:    Detailed 10 point ROS done.       PHYSICAL EXAMINATION:      Vital Signs Last 24 Hrs  T(C): 37.2 (22 Oct 2017 07:00), Max: 39.3 (21 Oct 2017 15:00)  T(F): 98.9 (22 Oct 2017 07:00), Max: 102.8 (21 Oct 2017 15:00)  HR: 62 (22 Oct 2017 09:35) (54 - 116)  BP: 102/51 (22 Oct 2017 09:00) (90/51 - 180/120)  BP(mean): 63 (22 Oct 2017 09:00) (59 - 74)  RR: 18 (22 Oct 2017 09:00) (14 - 30)  SpO2: 100% (22 Oct 2017 09:35) (80% - 100%)  Mode: CPAP with PS  FiO2: 40  PEEP: 5  PS: 5  ITime: 1  MAP: 1.5  PIP: 1   (if applicable)    GENERAL: The patient is a well-developed, well-nourished, in no apparent distress.     HEENT: Head is normocephalic and atraumatic. Extraocular muscles are intact. Mucous membranes are moist.     NECK: Supple, no palpable adenopathy.    LUNGS: Clear to auscultation, no wheezing, rales, or rhonchi.    HEART: Regular rate and rhythm without murmur.    ABDOMEN: Soft, nontender, and nondistended.  No hepatosplenomegaly is noted.    EXTREMITIES: Without any cyanosis, clubbing, rash, lesions or edema.    NEUROLOGIC: Awake, alert, oriented.     SKIN: Warm, dry, good turgor.      LABS:                        11.6   5.3   )-----------( 153      ( 22 Oct 2017 03:48 )             35.9     10-    135  |  102  |  13  ----------------------------<  154<H>  3.9   |  28  |  0.81    Ca    7.5<L>      22 Oct 2017 03:48  Phos  2.7     10-22  Mg     1.7     10-22    TPro  5.8<L>  /  Alb  2.6<L>  /  TBili  0.8  /  DBili  x   /  AST  152<H>  /  ALT  95<H>  /  AlkPhos  145<H>  10-22    PT/INR - ( 21 Oct 2017 23:44 )   PT: 12.6 sec;   INR: 1.15 ratio         PTT - ( 21 Oct 2017 23:44 )  PTT:30.2 sec  Urinalysis Basic - ( 21 Oct 2017 16:48 )    Color: Yellow / Appearance: Slightly Turbid / S.010 / pH: x  Gluc: x / Ketone: Negative  / Bili: Negative / Urobili: 1   Blood: x / Protein: 30 mg/dL / Nitrite: Positive   Leuk Esterase: Moderate / RBC: 2-5 /HPF / WBC >50 /HPF   Sq Epi: x / Non Sq Epi: Few /HPF / Bacteria: Many /HPF      ABG - ( 22 Oct 2017 05:11 )  pH: 7.38  /  pCO2: 44    /  pO2: 147   / HCO3: 25    / Base Excess: 0.6   /  SaO2: 99                CARDIAC MARKERS ( 22 Oct 2017 03:48 )  1.470 ng/mL / x     / 1094 U/L / x     / 11.0 ng/mL  CARDIAC MARKERS ( 21 Oct 2017 23:53 )  0.580 ng/mL / x     / 1146 U/L / x     / 13.4 ng/mL    < from: Xray Chest 1 View AP- PORTABLE-Urgent (10.21.17 @ 22:22) >  IMPRESSION:  Progression of right infiltrate.    Thankyou for the courtesy of this referral.    < end of copied text >    < from: Xray Chest 1 View AP/PA (10.21.17 @ 15:36) >  IMPRESSION:    right perihilar lower lobe patchy airspace consolidation.   Cardiomegaly. Infectious pneumonia should be considered..          < end of copied text >    Serum Pro-Brain Natriuretic Peptide: 972 pg/mL (10-21-17 @ 23:53)    Lactate, Blood: 0.9 mmol/L (10-22-17 @ 03:49)  Lactate, Blood: 1.0 mmol/L (10-21-17 @ 18:17)  Lactate, Blood: 2.1 mmol/L (10-21-17 @ 14:55)            MICROBIOLOGY: (if applicable)    RADIOLOGY & ADDITIONAL STUDIES:  EKG:   CXR:  ECHO:      RECOMMENDATIONS: HPI:( asp per chart)  86yo F with pmhx of legal blindness, HTN, and ?prediabetes came in with c/o fever since the past 2-3 days. She said this morning she also felt weak so her family bought her in. She is also SOB. Denies chest pain, abdominal pain, or dysuria. Denies nausea or vomiting. She c/o chronic b/l knee pain due to arthritis. Also states having pain in small ulcer below her left arm along with itching since the past couple of days. Denies cough, but does have phlegm production. No other complaints at this time. Pt was initially given ceftriaxone and azithro for covering CAP and UTI.   pt was code sepsis in ER. she had WBC 4k with 13% bands, elevated transaminases and mildly elevated lactate. Patient had RR on floor for respiratory distress and AMS> she had hypoxia to 75% and was intubated by anesthesia and now in ICU.   ABX was broadened to zosyn and levofloxacin    pt is now intubated in ICU. no acute distress. responsive.        PAST MEDICAL & SURGICAL HISTORY:  Legally blind  Arthritis  History of hypertension      Allergies    No Known Allergies    Intolerances        MEDICATIONS  (STANDING):  heparin  Injectable 5000 Unit(s) SubCutaneous every 8 hours  ipratropium 17 MICROgram(s) HFA Inhaler 1 Puff(s) Inhalation every 6 hours  levoFLOXacin IVPB 500 milliGRAM(s) IV Intermittent every 24 hours  levoFLOXacin IVPB      pantoprazole  Injectable 40 milliGRAM(s) IV Push daily  piperacillin/tazobactam IVPB. 3.375 Gram(s) IV Intermittent every 8 hours  propofol Infusion 5 MICROgram(s)/kG/Min (3.741 mL/Hr) IV Continuous <Continuous>  sodium chloride 0.9%. 1000 milliLiter(s) (75 mL/Hr) IV Continuous <Continuous>  tiotropium 18 MICROgram(s) Capsule 1 Capsule(s) Inhalation daily      MEDICATIONS  (PRN):  acetaminophen  Suppository 650 milliGRAM(s) Rectal every 6 hours PRN For Temp greater than 38 C (100.4 F)  ALBUTerol    90 MICROgram(s) HFA Inhaler 2 Puff(s) Inhalation every 6 hours PRN Shortness of Breath and/or Wheezing   Medications up to date at time of exam.    Medications up to date at time of exam.    FAMILY HISTORY: no pertinent.       SOCIAL HISTORY: Patient is mostly bedbound. Ambulates rarely with walker due to chronic knee pain. Lives with family at home. Family also serves as HHA.   Smoking History:   Living Condition:  Work History:   Travel History: denies recent travel  Illicit Substance Use: denies  Alcohol Use: denies  Pets:    REVIEW OF SYSTEMS:    Detailed 10 point ROS done.       PHYSICAL EXAMINATION:      Vital Signs Last 24 Hrs  T(C): 37.2 (22 Oct 2017 07:00), Max: 39.3 (21 Oct 2017 15:00)  T(F): 98.9 (22 Oct 2017 07:00), Max: 102.8 (21 Oct 2017 15:00)  HR: 62 (22 Oct 2017 09:35) (54 - 116)  BP: 102/51 (22 Oct 2017 09:00) (90/51 - 180/120)  BP(mean): 63 (22 Oct 2017 09:00) (59 - 74)  RR: 18 (22 Oct 2017 09:00) (14 - 30)  SpO2: 100% (22 Oct 2017 09:35) (80% - 100%)  Mode: CPAP with PS  FiO2: 40  PEEP: 5  PS: 5  ITime: 1  MAP: 1.5  PIP: 1   (if applicable)    GENERAL: The patient is a well-developed, well-nourished, in no apparent distress.     HEENT: orally intubated.     NECK: Supple, no palpable adenopathy.    LUNGS: BLAE +, B/L rales/crepts R>L.    HEART: S1S2 normal.     ABDOMEN: Soft, nontender, and nondistended.  No hepatosplenomegaly is noted.    EXTREMITIES: Without any cyanosis, clubbing, rash, lesions or edema.    NEUROLOGIC: Awake, alert, responsive.    SKIN:   there is a chronic looking, healing ulcer on the left axillary area.     LABS:                        11.6   5.3   )-----------( 153      ( 22 Oct 2017 03:48 )             35.9     10-22    135  |  102  |  13  ----------------------------<  154<H>  3.9   |  28  |  0.81    Ca    7.5<L>      22 Oct 2017 03:48  Phos  2.7     10-22  Mg     1.7     10-22    TPro  5.8<L>  /  Alb  2.6<L>  /  TBili  0.8  /  DBili  x   /  AST  152<H>  /  ALT  95<H>  /  AlkPhos  145<H>  10    PT/INR - ( 21 Oct 2017 23:44 )   PT: 12.6 sec;   INR: 1.15 ratio         PTT - ( 21 Oct 2017 23:44 )  PTT:30.2 sec  Urinalysis Basic - ( 21 Oct 2017 16:48 )    Color: Yellow / Appearance: Slightly Turbid / S.010 / pH: x  Gluc: x / Ketone: Negative  / Bili: Negative / Urobili: 1   Blood: x / Protein: 30 mg/dL / Nitrite: Positive   Leuk Esterase: Moderate / RBC: 2-5 /HPF / WBC >50 /HPF   Sq Epi: x / Non Sq Epi: Few /HPF / Bacteria: Many /HPF      ABG - ( 22 Oct 2017 05:11 )  pH: 7.38  /  pCO2: 44    /  pO2: 147   / HCO3: 25    / Base Excess: 0.6   /  SaO2: 99                CARDIAC MARKERS ( 22 Oct 2017 03:48 )  1.470 ng/mL / x     / 1094 U/L / x     / 11.0 ng/mL  CARDIAC MARKERS ( 21 Oct 2017 23:53 )  0.580 ng/mL / x     / 1146 U/L / x     / 13.4 ng/mL    < from: Xray Chest 1 View AP- PORTABLE-Urgent (10.21.17 @ 22:22) >  IMPRESSION:  Progression of right infiltrate.    Thankyou for the courtesy of this referral.    < end of copied text >    < from: Xray Chest 1 View AP/PA (10.21.17 @ 15:36) >  IMPRESSION:    right perihilar lower lobe patchy airspace consolidation.   Cardiomegaly. Infectious pneumonia should be considered..          < end of copied text >    Serum Pro-Brain Natriuretic Peptide: 972 pg/mL (10-21-17 @ 23:53)    Lactate, Blood: 0.9 mmol/L (10-22-17 @ 03:49)  Lactate, Blood: 1.0 mmol/L (10-21-17 @ 18:17)  Lactate, Blood: 2.1 mmol/L (10-21-17 @ 14:55)            MICROBIOLOGY: (if applicable)    RADIOLOGY & ADDITIONAL STUDIES:  EKG:   CXR:  ECHO:      RECOMMENDATIONS:

## 2017-10-22 NOTE — PROGRESS NOTE ADULT - ASSESSMENT
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    Patient presents with sepsis likely 2/2 right multifocal pneumonia, legionella a concern given 'normal WBC' with bandemia and hyponatremia and elevated LFT.      Problem/Recommendation - 1:  Problem: Acute respiratory failure with hypoxia. Recommendation: 2/2 right multifocal pneumonia, severe, requiring mechanical ventilation  CURB-65 score = 3  concern for legionella given hyponatremia and relative leukopenia with bandemia  s/p vancomycin 1 gm and zosyn in ed  - c/w zosyn; levaquin added to cover atypicals and possible legionella  - follow up urine legionella, strep antigen, sputum culture, procalcitonin, RVP  - follow up BCx.     Problem/Recommendation - 2:  ·  Problem: Sepsis, due to unspecified organism.  Recommendation: 2/3 qsofa criteria met (change in mental status, tachypnea) 2/2 right multifocal pneumonia and UTI  lactate normalized after 3700cc fluid  - broad spectrum with zosyn and levaquin  - long  - follow up BCx and UCx  - consent for central line obtained by HCP in case deterioration.      Problem/Recommendation - 3:  ·  Problem: Urinary tract infection without hematuria, site unspecified.  Recommendation: likely contributing to sepsis  - on zosyn  - follow up UCx.      Problem/Recommendation - 4:  ·  Problem: Gastrointestinal hemorrhage, unspecified gastrointestinal hemorrhage type.  Recommendation: bright red blood via NGT 2/2 ? traumatic intubation, possibly UGIB  - protonix gtt  - cbc q6  - GI consult in am.      Problem/Recommendation - 5:  ·  Problem: Transaminitis.  Recommendation: possibly 2/2 legionella pneumonia  not obstructive picture  possibly 2/2 sepsis, hepatic congestion 2/2 CHF less likely  - follow up viral hepatitis panel in am  - monitor.      Problem/Recommendation - 6:  Problem: Hyponatremia. Recommendation: possibly 2/2 legionella pneumonia  hypovolemic hyponatremia  - NS IVF  - check urine osm and Na.     Problem/Recommendation - 7:  Problem: History of hypertension. Recommendation: Bp stable for now  - hold hyzaar in context of sepsis.     Problem/Recommendation - 8:  Problem: Need for prophylactic measure. Recommendation: bleeding from NGT   improve score =3, will hold off on chemical DVT ppx 2/2 possible GIB  compression boots and protonix gtt. 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    Patient presents with sepsis likely 2/2 right multifocal pneumonia, legionella a concern given 'normal WBC' with bandemia and hyponatremia and elevated LFT.      Problem/Recommendation - 1:  Problem: Acute respiratory failure with hypoxia. Recommendation: 2/2 right multifocal pneumonia, severe, requiring mechanical ventilation  CURB-65 score = 3  concern for legionella given hyponatremia and relative leukopenia with bandemia  s/p vancomycin 1 gm and zosyn in ed  - c/w zosyn; levaquin added to cover atypicals and possible legionella  - follow up urine legionella, strep antigen, sputum culture, procalcitonin, RVP  - follow up BCx.     Problem/Recommendation - 2:  ·  Problem: Sepsis, due to unspecified organism.  Recommendation: 2/3 qsofa criteria met (change in mental status, tachypnea) 2/2 right multifocal pneumonia and UTI  lactate normalized after 3700cc fluid  - broad spectrum with zosyn and levaquin  - long  - follow up BCx and UCx  - consent for central line obtained by HCP in case deterioration.      Problem/Recommendation - 3:  ·  Problem: Urinary tract infection without hematuria, site unspecified.  Recommendation: likely contributing to sepsis  - on zosyn  - follow up UCx.      Problem/Recommendation - 4:  ·  Problem: Gastrointestinal hemorrhage, unspecified gastrointestinal hemorrhage type.  Recommendation: bright red blood via NGT 2/2 ? traumatic intubation, possibly UGIB  - protonix gtt  - cbc q6  - GI consult Dr. Cedillo will consider EGD after patient is stable     Problem/Recommendation - 5:  ·  Problem: Transaminitis.  Recommendation: possibly 2/2 legionella pneumonia  not obstructive picture  possibly 2/2 sepsis, hepatic congestion 2/2 CHF less likely  - follow up viral hepatitis panel in am  - monitor.      Problem/Recommendation - 6:  Problem: Hyponatremia. Recommendation: possibly 2/2 legionella pneumonia  hypovolemic hyponatremia  - NS IVF  - check urine osm and Na.     Problem/Recommendation - 7:  Problem: History of hypertension. Recommendation: Bp stable for now  - hold hyzaar in context of sepsis.     Problem/Recommendation - 8:  Problem: Need for prophylactic measure. Recommendation: bleeding from NGT   improve score =3, will hold off on chemical DVT ppx 2/2 possible GIB  compression boots and protonix gtt. 86yo female from home, walks with walker, lives with daughter and granddaughter, being cared for by family, PMH HTN, 'pre-diabetes,' right knee OA, legally blind 2/2 glaucoma/cataracts brought to ED by family 2/2 1-2 days of weakness, subjective fever, chills.    Patient presents with sepsis likely 2/2 right multifocal pneumonia, legionella a concern given 'normal WBC' with bandemia and hyponatremia and elevated LFT.      Problem/Recommendation - 1:  Problem: Acute respiratory failure with hypoxia. Recommendation: 2/2 right multifocal pneumonia, severe, requiring mechanical ventilation  CURB-65 score = 3  - continue vent support, will try to extubate tomorrow  concern for legionella given hyponatremia and relative leukopenia with bandemia  s/p vancomycin 1 gm and zosyn in ed  - c/w zosyn; levaquin added to cover atypicals and possible legionella  - follow up urine legionella, strep antigen, sputum culture, procalcitonin, RVP  - follow up BCx.     Problem/Recommendation - 2:  ·  Problem: Sepsis, due to unspecified organism.  Recommendation: 2/3 qsofa criteria met (change in mental status, tachypnea) 2/2 right multifocal pneumonia and UTI  lactate normalized after 3700cc fluid  - fluids @ 75  - broad spectrum with zosyn and levaquin  - long  - follow up BCx and UCx  - consent for central line obtained by HCP in case deterioration.      Problem/Recommendation - 3:  ·  Problem: Urinary tract infection without hematuria, site unspecified.  Recommendation: likely contributing to sepsis  - on zosyn  - follow up UCx.      Problem/Recommendation - 4:  ·  Problem: Gastrointestinal hemorrhage, unspecified gastrointestinal hemorrhage type.  Recommendation: bright red blood via NGT 2/2 ? traumatic intubation, possibly UGIB  - protonix gtt  - cbc q6  - GI consult Dr. Cedillo will consider EGD after patient is stable     Problem/Recommendation - 5:  ·  Problem: Transaminitis.  Recommendation: possibly 2/2 legionella pneumonia  not obstructive picture  possibly 2/2 sepsis, hepatic congestion 2/2 CHF less likely  - follow up viral hepatitis panel in am  - monitor.      Problem/Recommendation - 6:  Problem: Hyponatremia. Recommendation: possibly 2/2 legionella pneumonia  hypovolemic hyponatremia  - NS IVF  - check urine osm and Na.     Problem/Recommendation - 7:  Problem: History of hypertension. Recommendation: Bp stable for now  - hold hyzaar in context of sepsis.     Problem/Recommendation - 8:  Problem: Need for prophylactic measure. Recommendation: bleeding from NGT   improve score =3, will hold off on chemical DVT ppx 2/2 possible GIB  compression boots and protonix gtt.

## 2017-10-22 NOTE — CONSULT NOTE ADULT - SUBJECTIVE AND OBJECTIVE BOX
Patient is a 85y old  Female who presents with a chief complaint of Fever (21 Oct 2017 19:30)        REVIEW OF SYSTEMS  Constitutional:   No fever, no fatigue, no pallor, no night sweats, no weight loss.  HEENT:   No eye pain, no vision changes, no icterus, no mouth ulcers.  Respiratory:   No shortness of breath, no cough, no respiratory distress.   Cardiovascular:   No chest pain, no palpitations.   Gastrointestinal: No abdominal pain, no nausea, no vomiting , no diahrrea, no constipation, no hematochezia,no melena.  Skin:   No rashes, no jaundice, no eczema.   Musculoskeletal:   No joint pain, no swelling, no myalgia.   Neurologic:   No headache, no seizure, no weakness.   Genitourinary:   No dysuria, no decreased urine output.  Psychiatric:  No depression, no anxiety,   Endocrine:   No thyroid disease, no diabetes.  Heme/Lymphatic:   No anemia, no blood transfusions, no lymph node enlargement, no bleeding, no bruising.  ___________________________________________________________________________________________  Allergies    No Known Allergies    Intolerances      MEDICATIONS  (STANDING):  ipratropium 17 MICROgram(s) HFA Inhaler 1 Puff(s) Inhalation every 6 hours  levoFLOXacin IVPB 500 milliGRAM(s) IV Intermittent every 24 hours  levoFLOXacin IVPB      pantoprazole Infusion 8 mG/Hr (10 mL/Hr) IV Continuous <Continuous>  piperacillin/tazobactam IVPB. 3.375 Gram(s) IV Intermittent every 8 hours  propofol Infusion 5 MICROgram(s)/kG/Min (3.741 mL/Hr) IV Continuous <Continuous>  sodium chloride 0.9%. 1000 milliLiter(s) (150 mL/Hr) IV Continuous <Continuous>  tiotropium 18 MICROgram(s) Capsule 1 Capsule(s) Inhalation daily    MEDICATIONS  (PRN):  acetaminophen  Suppository 650 milliGRAM(s) Rectal every 6 hours PRN For Temp greater than 38 C (100.4 F)  ALBUTerol    90 MICROgram(s) HFA Inhaler 2 Puff(s) Inhalation every 6 hours PRN Shortness of Breath and/or Wheezing      PAST MEDICAL & SURGICAL HISTORY:  Legally blind  Arthritis  History of hypertension    FAMILY HISTORY:    Social History: No hsitory of : Tobacco use, IVDA, EToH  ______________________________________________________________________________________    PHYSICAL EXAM    Daily Height in cm: 175.26 (21 Oct 2017 14:24)    Daily Weight in k.9 (21 Oct 2017 23:42)  BMI: 40.6 (10-21 @ 14:24)  Change in Weight:  Vital Signs Last 24 Hrs  T(C): 37.5 (22 Oct 2017 04:31), Max: 39.3 (21 Oct 2017 15:00)  T(F): 99.5 (22 Oct 2017 04:31), Max: 102.8 (21 Oct 2017 15:00)  HR: 63 (22 Oct 2017 06:00) (54 - 116)  BP: 95/52 (22 Oct 2017 06:00) (90/51 - 180/120)  BP(mean): 62 (22 Oct 2017 06:00) (59 - 72)  RR: 21 (22 Oct 2017 06:00) (14 - 30)  SpO2: 100% (22 Oct 2017 06:00) (80% - 100%)    General:  Well developed, well nourished, alert and active, no pallor, NAD.  HEENT:    Normal appearance of conjunctiva, ears, nose, lips, oropharynx, and oral mucosa, anicteric.  Neck:  No masses, no asymmetry.  Lymph Nodes:  No lymphadenopathy.   Cardiovascular:  RRR normal S1/S2, no murmur.  Respiratory:  CTA B/L, normal respiratory effort.   Abdominal:   soft, no masses or tenderness, normoactive BS, NT/ND, no HSM.  Extremities:   No clubbing or cyanosis, normal capillary refill, no edema.   Skin:   No rash, jaundice, lesions, eczema.   Musculoskeletal:  No joint swelling, erythema or tenderness.   Neuro: No focal deficits.   Other:   _______________________________________________________________________________________________  Lab Results:                          11.6   5.3   )-----------( 153      ( 22 Oct 2017 03:48 )             35.9     10-22    135  |  102  |  13  ----------------------------<  154<H>  3.9   |  28  |  0.81    Ca    7.5<L>      22 Oct 2017 03:48  Phos  2.7     10-22  Mg     1.7     10-22    TPro  5.8<L>  /  Alb  2.6<L>  /  TBili  0.8  /  DBili  x   /  AST  152<H>  /  ALT  95<H>  /  AlkPhos  145<H>  10-22    LIVER FUNCTIONS - ( 22 Oct 2017 03:48 )  Alb: 2.6 g/dL / Pro: 5.8 g/dL / ALK PHOS: 145 U/L / ALT: 95 U/L DA / AST: 152 U/L / GGT: x           PT/INR - ( 21 Oct 2017 23:44 )   PT: 12.6 sec;   INR: 1.15 ratio         PTT - ( 21 Oct 2017 23:44 )  PTT:30.2 sec  Triglycerides, Serum: 215 mg/dL (10-22 @ 03:48)    CARDIAC MARKERS ( 22 Oct 2017 03:48 )  1.470 ng/mL / x     / 1094 U/L / x     / 11.0 ng/mL  CARDIAC MARKERS ( 21 Oct 2017 23:53 )  0.580 ng/mL / x     / 1146 U/L / x     / 13.4 ng/mL      Stool Results:          RADIOLOGY RESULTS:    SURGICAL PATHOLOGY:

## 2017-10-22 NOTE — PROGRESS NOTE ADULT - ASSESSMENT
84yo female from home, walks with walker, lives with daughter and granddaughter, being cared for by family, PMH HTN, 'pre-diabetes,' right knee OA, legally blind 2/2 glaucoma/cataracts brought to ED by family for 1-2 days of weakness, and fevers    Patient presents with sepsis likely secondary to right multifocal pneumonia, legionella a concern given 'normal WBC' with bandemia and hyponatremia, positive urinalysis, and elevated LFTs.

## 2017-10-22 NOTE — CONSULT NOTE ADULT - PROBLEM SELECTOR PROBLEM 1
Gastrointestinal hemorrhage, unspecified gastrointestinal hemorrhage type
Acute respiratory failure with hypoxia

## 2017-10-22 NOTE — PROGRESS NOTE ADULT - PROBLEM SELECTOR PLAN 2
right sided, most likely contributed to fever on presentation  CURB-65 score = 3  concern for legionella given hyponatremia and relative leukopenia with bandemia  current on IV antibiotics  - c/w zosyn; levaquin   - ID consulted by ICU team  - follow up urine legionella, strep antigen, sputum culture, procalcitonin, RVP  - follow up Blood Cultures  - ID consulted by ICU team

## 2017-10-22 NOTE — CONSULT NOTE ADULT - ASSESSMENT
·	follow up blood cultures and urine cultures  ·	follow up urine legionella ag and send pneumococcal ag  ·	if any ET tube secretions send it for gram stain and culture.   ·	continue Zosyn and levofloxacin for now  ·	ICU care. 86yo F with pmhx of legal blindness, HTN, and ?prediabetes came in with c/o fever since the past 2-3 days and looking increasingly weak on day of admission. Pt was initially given ceftriaxone and azithro for covering CAP and UTI as CXR showed right sided infiltrate and u/a was positive.   pt was code sepsis in ER. she had WBC 4k with 13% bands, elevated transaminases and mildly elevated lactate. Patient had RR on floor for respiratory distress and AMS> she had hypoxia to 75% and was intubated by anesthesia and now in ICU.   ABX was broadened to zosyn and levofloxacin    Impression:   1. right sided pneumonia, community acquired.  2. acute hypoxic respiratory failure sec to pneumonia  3. positive urinalysis: asymptomatic, u/a collection(from bedpan) unlikley clean catch.    Plan:  ·	follow up urine legionella ag and send pneumococcal ag  ·	if any ET tube secretions send it for gram stain and culture.   ·	continue Zosyn and levofloxacin for now  ·	ICU care.     Case discussed in detail with the ICU team and family.   Thanks for this consultation. please call me if any questions at [phone].

## 2017-10-23 LAB
-  AMIKACIN: SIGNIFICANT CHANGE UP
-  AMPICILLIN/SULBACTAM: SIGNIFICANT CHANGE UP
-  AMPICILLIN: SIGNIFICANT CHANGE UP
-  AZTREONAM: SIGNIFICANT CHANGE UP
-  CEFAZOLIN: SIGNIFICANT CHANGE UP
-  CEFEPIME: SIGNIFICANT CHANGE UP
-  CEFOXITIN: SIGNIFICANT CHANGE UP
-  CEFTAZIDIME: SIGNIFICANT CHANGE UP
-  CEFTRIAXONE: SIGNIFICANT CHANGE UP
-  CIPROFLOXACIN: SIGNIFICANT CHANGE UP
-  ERTAPENEM: SIGNIFICANT CHANGE UP
-  GENTAMICIN: SIGNIFICANT CHANGE UP
-  IMIPENEM: SIGNIFICANT CHANGE UP
-  LEVOFLOXACIN: SIGNIFICANT CHANGE UP
-  MEROPENEM: SIGNIFICANT CHANGE UP
-  NITROFURANTOIN: SIGNIFICANT CHANGE UP
-  PIPERACILLIN/TAZOBACTAM: SIGNIFICANT CHANGE UP
-  TOBRAMYCIN: SIGNIFICANT CHANGE UP
-  TRIMETHOPRIM/SULFAMETHOXAZOLE: SIGNIFICANT CHANGE UP
ALBUMIN SERPL ELPH-MCNC: 2.4 G/DL — LOW (ref 3.5–5)
ALP SERPL-CCNC: 132 U/L — HIGH (ref 40–120)
ALT FLD-CCNC: 83 U/L DA — HIGH (ref 10–60)
ANION GAP SERPL CALC-SCNC: 7 MMOL/L — SIGNIFICANT CHANGE UP (ref 5–17)
AST SERPL-CCNC: 98 U/L — HIGH (ref 10–40)
BASE EXCESS BLDA CALC-SCNC: 0.1 MMOL/L — SIGNIFICANT CHANGE UP (ref -2–2)
BASE EXCESS BLDA CALC-SCNC: 0.7 MMOL/L — SIGNIFICANT CHANGE UP (ref -2–2)
BASOPHILS # BLD AUTO: 0.1 K/UL — SIGNIFICANT CHANGE UP (ref 0–0.2)
BASOPHILS NFR BLD AUTO: 1.1 % — SIGNIFICANT CHANGE UP (ref 0–2)
BILIRUB SERPL-MCNC: 0.7 MG/DL — SIGNIFICANT CHANGE UP (ref 0.2–1.2)
BLOOD GAS COMMENTS ARTERIAL: SIGNIFICANT CHANGE UP
BLOOD GAS COMMENTS ARTERIAL: SIGNIFICANT CHANGE UP
BUN SERPL-MCNC: 9 MG/DL — SIGNIFICANT CHANGE UP (ref 7–18)
CALCIUM SERPL-MCNC: 8.1 MG/DL — LOW (ref 8.4–10.5)
CHLORIDE SERPL-SCNC: 106 MMOL/L — SIGNIFICANT CHANGE UP (ref 96–108)
CO2 SERPL-SCNC: 27 MMOL/L — SIGNIFICANT CHANGE UP (ref 22–31)
CREAT SERPL-MCNC: 0.6 MG/DL — SIGNIFICANT CHANGE UP (ref 0.5–1.3)
CULTURE RESULTS: SIGNIFICANT CHANGE UP
EOSINOPHIL # BLD AUTO: 0.4 K/UL — SIGNIFICANT CHANGE UP (ref 0–0.5)
EOSINOPHIL NFR BLD AUTO: 6.2 % — HIGH (ref 0–6)
GLUCOSE SERPL-MCNC: 98 MG/DL — SIGNIFICANT CHANGE UP (ref 70–99)
HCO3 BLDA-SCNC: 24 MMOL/L — SIGNIFICANT CHANGE UP (ref 23–27)
HCO3 BLDA-SCNC: 25 MMOL/L — SIGNIFICANT CHANGE UP (ref 23–27)
HCT VFR BLD CALC: 34.5 % — SIGNIFICANT CHANGE UP (ref 34.5–45)
HGB BLD-MCNC: 10.8 G/DL — LOW (ref 11.5–15.5)
HOROWITZ INDEX BLDA+IHG-RTO: 40 — SIGNIFICANT CHANGE UP
HOROWITZ INDEX BLDA+IHG-RTO: 40 — SIGNIFICANT CHANGE UP
LYMPHOCYTES # BLD AUTO: 1.7 K/UL — SIGNIFICANT CHANGE UP (ref 1–3.3)
LYMPHOCYTES # BLD AUTO: 28 % — SIGNIFICANT CHANGE UP (ref 13–44)
MAGNESIUM SERPL-MCNC: 2 MG/DL — SIGNIFICANT CHANGE UP (ref 1.6–2.6)
MCHC RBC-ENTMCNC: 30.1 PG — SIGNIFICANT CHANGE UP (ref 27–34)
MCHC RBC-ENTMCNC: 31.3 GM/DL — LOW (ref 32–36)
MCV RBC AUTO: 96.1 FL — SIGNIFICANT CHANGE UP (ref 80–100)
METHOD TYPE: SIGNIFICANT CHANGE UP
MONOCYTES # BLD AUTO: 0.7 K/UL — SIGNIFICANT CHANGE UP (ref 0–0.9)
MONOCYTES NFR BLD AUTO: 11.1 % — SIGNIFICANT CHANGE UP (ref 2–14)
NEUTROPHILS # BLD AUTO: 3.2 K/UL — SIGNIFICANT CHANGE UP (ref 1.8–7.4)
NEUTROPHILS NFR BLD AUTO: 53.7 % — SIGNIFICANT CHANGE UP (ref 43–77)
ORGANISM # SPEC MICROSCOPIC CNT: SIGNIFICANT CHANGE UP
ORGANISM # SPEC MICROSCOPIC CNT: SIGNIFICANT CHANGE UP
PCO2 BLDA: 41 MMHG — SIGNIFICANT CHANGE UP (ref 32–46)
PCO2 BLDA: 43 MMHG — SIGNIFICANT CHANGE UP (ref 32–46)
PH BLDA: 7.38 — SIGNIFICANT CHANGE UP (ref 7.35–7.45)
PH BLDA: 7.39 — SIGNIFICANT CHANGE UP (ref 7.35–7.45)
PHOSPHATE SERPL-MCNC: 2.4 MG/DL — LOW (ref 2.5–4.5)
PLATELET # BLD AUTO: 176 K/UL — SIGNIFICANT CHANGE UP (ref 150–400)
PO2 BLDA: 132 MMHG — HIGH (ref 74–108)
PO2 BLDA: 139 MMHG — HIGH (ref 74–108)
POTASSIUM SERPL-MCNC: 3.8 MMOL/L — SIGNIFICANT CHANGE UP (ref 3.5–5.3)
POTASSIUM SERPL-SCNC: 3.8 MMOL/L — SIGNIFICANT CHANGE UP (ref 3.5–5.3)
PROT SERPL-MCNC: 5.9 G/DL — LOW (ref 6–8.3)
RBC # BLD: 3.59 M/UL — LOW (ref 3.8–5.2)
RBC # FLD: 12 % — SIGNIFICANT CHANGE UP (ref 10.3–14.5)
SAO2 % BLDA: 98 % — HIGH (ref 92–96)
SAO2 % BLDA: 98 % — HIGH (ref 92–96)
SODIUM SERPL-SCNC: 140 MMOL/L — SIGNIFICANT CHANGE UP (ref 135–145)
SPECIMEN SOURCE: SIGNIFICANT CHANGE UP
WBC # BLD: 5.9 K/UL — SIGNIFICANT CHANGE UP (ref 3.8–10.5)
WBC # FLD AUTO: 5.9 K/UL — SIGNIFICANT CHANGE UP (ref 3.8–10.5)

## 2017-10-23 PROCEDURE — 99232 SBSQ HOSP IP/OBS MODERATE 35: CPT

## 2017-10-23 RX ORDER — NYSTATIN CREAM 100000 [USP'U]/G
1 CREAM TOPICAL THREE TIMES A DAY
Qty: 0 | Refills: 0 | Status: DISCONTINUED | OUTPATIENT
Start: 2017-10-23 | End: 2017-10-27

## 2017-10-23 RX ORDER — PANTOPRAZOLE SODIUM 20 MG/1
40 TABLET, DELAYED RELEASE ORAL
Qty: 0 | Refills: 0 | Status: DISCONTINUED | OUTPATIENT
Start: 2017-10-23 | End: 2017-10-27

## 2017-10-23 RX ORDER — IPRATROPIUM/ALBUTEROL SULFATE 18-103MCG
3 AEROSOL WITH ADAPTER (GRAM) INHALATION EVERY 6 HOURS
Qty: 0 | Refills: 0 | Status: DISCONTINUED | OUTPATIENT
Start: 2017-10-23 | End: 2017-10-27

## 2017-10-23 RX ORDER — LOSARTAN POTASSIUM 100 MG/1
25 TABLET, FILM COATED ORAL DAILY
Qty: 0 | Refills: 0 | Status: DISCONTINUED | OUTPATIENT
Start: 2017-10-24 | End: 2017-10-27

## 2017-10-23 RX ORDER — FUROSEMIDE 40 MG
40 TABLET ORAL ONCE
Qty: 0 | Refills: 0 | Status: COMPLETED | OUTPATIENT
Start: 2017-10-23 | End: 2017-10-23

## 2017-10-23 RX ORDER — ACETAMINOPHEN 500 MG
650 TABLET ORAL EVERY 6 HOURS
Qty: 0 | Refills: 0 | Status: DISCONTINUED | OUTPATIENT
Start: 2017-10-23 | End: 2017-10-27

## 2017-10-23 RX ADMIN — PIPERACILLIN AND TAZOBACTAM 25 GRAM(S): 4; .5 INJECTION, POWDER, LYOPHILIZED, FOR SOLUTION INTRAVENOUS at 05:31

## 2017-10-23 RX ADMIN — Medication 1 DROP(S): at 05:31

## 2017-10-23 RX ADMIN — Medication 1 PUFF(S): at 02:16

## 2017-10-23 RX ADMIN — NYSTATIN CREAM 1 APPLICATION(S): 100000 CREAM TOPICAL at 22:39

## 2017-10-23 RX ADMIN — HEPARIN SODIUM 5000 UNIT(S): 5000 INJECTION INTRAVENOUS; SUBCUTANEOUS at 13:50

## 2017-10-23 RX ADMIN — SODIUM CHLORIDE 75 MILLILITER(S): 9 INJECTION INTRAMUSCULAR; INTRAVENOUS; SUBCUTANEOUS at 05:31

## 2017-10-23 RX ADMIN — SENNA PLUS 2 TABLET(S): 8.6 TABLET ORAL at 22:50

## 2017-10-23 RX ADMIN — PIPERACILLIN AND TAZOBACTAM 25 GRAM(S): 4; .5 INJECTION, POWDER, LYOPHILIZED, FOR SOLUTION INTRAVENOUS at 13:50

## 2017-10-23 RX ADMIN — Medication 650 MILLIGRAM(S): at 22:49

## 2017-10-23 RX ADMIN — Medication 40 MILLIGRAM(S): at 16:12

## 2017-10-23 RX ADMIN — Medication 650 MILLIGRAM(S): at 22:39

## 2017-10-23 RX ADMIN — PANTOPRAZOLE SODIUM 40 MILLIGRAM(S): 20 TABLET, DELAYED RELEASE ORAL at 11:08

## 2017-10-23 RX ADMIN — HEPARIN SODIUM 5000 UNIT(S): 5000 INJECTION INTRAVENOUS; SUBCUTANEOUS at 05:31

## 2017-10-23 RX ADMIN — Medication 1 DROP(S): at 18:08

## 2017-10-23 RX ADMIN — POLYETHYLENE GLYCOL 3350 17 GRAM(S): 17 POWDER, FOR SOLUTION ORAL at 22:39

## 2017-10-23 RX ADMIN — NYSTATIN CREAM 1 APPLICATION(S): 100000 CREAM TOPICAL at 16:13

## 2017-10-23 RX ADMIN — HEPARIN SODIUM 5000 UNIT(S): 5000 INJECTION INTRAVENOUS; SUBCUTANEOUS at 22:37

## 2017-10-23 RX ADMIN — PIPERACILLIN AND TAZOBACTAM 25 GRAM(S): 4; .5 INJECTION, POWDER, LYOPHILIZED, FOR SOLUTION INTRAVENOUS at 22:36

## 2017-10-23 NOTE — PROGRESS NOTE ADULT - ASSESSMENT
85 year old female with Sepsis secondary to UTI and PNA s.p intubation with concern for UGI . No evidence of active GI bleeding at this time. No episodes of melena hematochezia and stable H/H.   No Gi intervention at this time needed. Will sign off for now please reconsult as needed.

## 2017-10-23 NOTE — PROGRESS NOTE ADULT - SUBJECTIVE AND OBJECTIVE BOX
Patient seen and examined.   Tm 100, afebrile now.     MEDICATIONS  (STANDING):  artificial  tears Solution 1 Drop(s) Both EYES two times a day  heparin  Injectable 5000 Unit(s) SubCutaneous every 8 hours  ipratropium 17 MICROgram(s) HFA Inhaler 1 Puff(s) Inhalation every 6 hours  pantoprazole  Injectable 40 milliGRAM(s) IV Push daily  piperacillin/tazobactam IVPB. 3.375 Gram(s) IV Intermittent every 8 hours  polyethylene glycol 3350 17 Gram(s) Oral at bedtime  senna 2 Tablet(s) Oral at bedtime  sodium chloride 0.9%. 1000 milliLiter(s) (75 mL/Hr) IV Continuous <Continuous>  tiotropium 18 MICROgram(s) Capsule 1 Capsule(s) Inhalation daily      MEDICATIONS  (PRN):  acetaminophen    Suspension. 650 milliGRAM(s) Oral every 6 hours PRN Mild Pain (1 - 3)  acetaminophen  Suppository 650 milliGRAM(s) Rectal every 6 hours PRN For Temp greater than 38 C (100.4 F)  ALBUTerol    90 MICROgram(s) HFA Inhaler 2 Puff(s) Inhalation every 6 hours PRN Shortness of Breath and/or Wheezing       Medications up to date at time of exam.      PHYSICAL EXAMINATION:      Vital Signs Last 24 Hrs  T(C): 36.2 (23 Oct 2017 05:00), Max: 37.8 (22 Oct 2017 16:00)  T(F): 97.1 (23 Oct 2017 05:00), Max: 100 (22 Oct 2017 16:00)  HR: 61 (23 Oct 2017 08:00) (48 - 71)  BP: 141/63 (23 Oct 2017 08:00) (104/80 - 144/73)  BP(mean): 80 (23 Oct 2017 08:00) (69 - 89)  RR: 19 (23 Oct 2017 08:00) (12 - 21)  SpO2: 100% (23 Oct 2017 08:00) (98% - 100%)  Mode: CPAP with PS  FiO2: 40  PEEP: 5  PS: 5  MAP: 7  PIP: 11   (if applicable)      GENERAL: The patient is a well-developed, well-nourished, in no apparent distress.     HEENT: orally intubated.     NECK: Supple, no palpable adenopathy.    LUNGS: BLAE +, B/L rales/crepts R>L.    HEART: S1S2 normal.     ABDOMEN: Soft, nontender, and nondistended.  No hepatosplenomegaly is noted.    EXTREMITIES: Without any cyanosis, clubbing, rash, lesions or edema.    NEUROLOGIC: Awake, alert, responsive.    SKIN:   there is a chronic looking, healing ulcer on the left axillary area.       LABS:                        10.8   5.9   )-----------( 176      ( 23 Oct 2017 06:35 )             34.5     10    140  |  106  |  9   ----------------------------<  98  3.8   |  27  |  0.60    Ca    8.1<L>      23 Oct 2017 06:35  Phos  2.4     10-23  Mg     2.0     10-23    TPro  5.9<L>  /  Alb  2.4<L>  /  TBili  0.7  /  DBili  x   /  AST  98<H>  /  ALT  83<H>  /  AlkPhos  132<H>  10-23    PT/INR - ( 21 Oct 2017 23:44 )   PT: 12.6 sec;   INR: 1.15 ratio         PTT - ( 21 Oct 2017 23:44 )  PTT:30.2 sec  Urinalysis Basic - ( 21 Oct 2017 16:48 )    Color: Yellow / Appearance: Slightly Turbid / S.010 / pH: x  Gluc: x / Ketone: Negative  / Bili: Negative / Urobili: 1   Blood: x / Protein: 30 mg/dL / Nitrite: Positive   Leuk Esterase: Moderate / RBC: 2-5 /HPF / WBC >50 /HPF   Sq Epi: x / Non Sq Epi: Few /HPF / Bacteria: Many /HPF      ABG - ( 23 Oct 2017 07:57 )  pH: 7.39  /  pCO2: 41    /  pO2: 139   / HCO3: 24    / Base Excess: 0.1   /  SaO2: 98          Culture - Sputum . (10.22.17 @ 16:26)    Gram Stain:   No polymorphonuclear leukocytes per low power field  No Squamous epithelial cells per low power field  No organisms seen per oil power field    Specimen Source: .Sputum Sputum    Culture - Blood (10.22.17 @ 00:50)    Specimen Source: .Blood Blood-Peripheral    Culture Results:   No growth to date.    Culture - Blood (10.22.17 @ 00:41)    Specimen Source: .Blood Blood-Peripheral    Culture Results:   No growth to date.      CARDIAC MARKERS ( 22 Oct 2017 13:00 )  Culture - Urine (10.21.17 @ 23:33)    Specimen Source: .Urine Catheterized    Culture Results:   10,000 - 49,000 CFU/mL Escherichia coli    1.260 ng/mL / x     / 947 U/L / x     / 8.0 ng/mL  CARDIAC MARKERS ( 22 Oct 2017 03:48 )  1.470 ng/mL / x     / 1094 U/L / x     / 11.0 ng/mL  CARDIAC MARKERS ( 21 Oct 2017 23:53 )  0.580 ng/mL / x     / 1146 U/L / x     / 13.4 ng/mL        Serum Pro-Brain Natriuretic Peptide: 972 pg/mL (10-21-17 @ 23:53)          < from: Xray Chest 1 View AP -PORTABLE-Routine (10.22.17 @ 10:17) >  INTERPRETATION:  CLINICAL INFORMATION: Shortness of breath. Acute   cystitis with hematuria. Evaluate for interval change.    A single portable view of the chest was obtained.     Comparison: 10/21/2017.     The mediastinal cardiac silhouette is unremarkable. Lines and tubes   unchanged.    Patchy opacities throughout the right lung may be slightly less prominent   when compared to the previous exam. Left lung remains clear.    No acute osseous finding.     IMPRESSION:    Findings as above.    < end of copied text >    < from: Xray Chest 1 View AP- PORTABLE-Urgent (10.21.17 @ 22:22) >  IMPRESSION:  Progression of right infiltrate.    Thankyou for the courtesy of this referral.    < end of copied text >    Legionella pneumophila Antigen, Urine (10.22.17 @ 12:18)    Legionella Antigen, Urine: Negative      < from: Transthoracic Echocardiogram (10.22.17 @ 07:56) >  CONCLUSIONS:  1. Normal mitral valve. Mild mitral regurgitation.  2. Normal trileaflet aortic valve. Mild aortic  regurgitation.  3. Aortic Root: 3.8 cm.  4. Normal left atrium.  5. Normal left ventricular internal dimensions and wall  thicknesses.  6. Normal Left Ventricular Systolic Function,  (EF = 55 to  60%)  7. Grade II diastolic dysfunction.  8. Normal right atrium.  9. Normal right ventricular size and function.  10. There is mild tricuspid regurgitation.  11. Normal pericardium with no pericardial effusion.    < end of copied text > Patient seen and examined.   Tm 100, afebrile now.   extubated.     MEDICATIONS  (STANDING):  artificial  tears Solution 1 Drop(s) Both EYES two times a day  heparin  Injectable 5000 Unit(s) SubCutaneous every 8 hours  ipratropium 17 MICROgram(s) HFA Inhaler 1 Puff(s) Inhalation every 6 hours  pantoprazole  Injectable 40 milliGRAM(s) IV Push daily  piperacillin/tazobactam IVPB. 3.375 Gram(s) IV Intermittent every 8 hours  polyethylene glycol 3350 17 Gram(s) Oral at bedtime  senna 2 Tablet(s) Oral at bedtime  sodium chloride 0.9%. 1000 milliLiter(s) (75 mL/Hr) IV Continuous <Continuous>  tiotropium 18 MICROgram(s) Capsule 1 Capsule(s) Inhalation daily      MEDICATIONS  (PRN):  acetaminophen    Suspension. 650 milliGRAM(s) Oral every 6 hours PRN Mild Pain (1 - 3)  acetaminophen  Suppository 650 milliGRAM(s) Rectal every 6 hours PRN For Temp greater than 38 C (100.4 F)  ALBUTerol    90 MICROgram(s) HFA Inhaler 2 Puff(s) Inhalation every 6 hours PRN Shortness of Breath and/or Wheezing       Medications up to date at time of exam.      PHYSICAL EXAMINATION:      Vital Signs Last 24 Hrs  T(C): 36.2 (23 Oct 2017 05:00), Max: 37.8 (22 Oct 2017 16:00)  T(F): 97.1 (23 Oct 2017 05:00), Max: 100 (22 Oct 2017 16:00)  HR: 61 (23 Oct 2017 08:00) (48 - 71)  BP: 141/63 (23 Oct 2017 08:00) (104/80 - 144/73)  BP(mean): 80 (23 Oct 2017 08:00) (69 - 89)  RR: 19 (23 Oct 2017 08:00) (12 - 21)  SpO2: 100% (23 Oct 2017 08:00) (98% - 100%)  Mode: CPAP with PS  FiO2: 40  PEEP: 5  PS: 5  MAP: 7  PIP: 11   (if applicable)      GENERAL: The patient is a well-developed, well-nourished, in no apparent distress.     HEENT: orally intubated.     NECK: Supple, no palpable adenopathy.    LUNGS: BLAE +, B/L rales/crepts R>L.    HEART: S1S2 normal.     ABDOMEN: Soft, nontender, and nondistended.  No hepatosplenomegaly is noted.    EXTREMITIES: Without any cyanosis, clubbing, rash, lesions or edema.    NEUROLOGIC: Awake, alert, responsive.    SKIN:   there is a chronic looking, healing ulcer on the left axillary area.       LABS:                        10.8   5.9   )-----------( 176      ( 23 Oct 2017 06:35 )             34.5     10-23    140  |  106  |  9   ----------------------------<  98  3.8   |  27  |  0.60    Ca    8.1<L>      23 Oct 2017 06:35  Phos  2.4     10-23  Mg     2.0     10-23    TPro  5.9<L>  /  Alb  2.4<L>  /  TBili  0.7  /  DBili  x   /  AST  98<H>  /  ALT  83<H>  /  AlkPhos  132<H>  10-23    PT/INR - ( 21 Oct 2017 23:44 )   PT: 12.6 sec;   INR: 1.15 ratio         PTT - ( 21 Oct 2017 23:44 )  PTT:30.2 sec  Urinalysis Basic - ( 21 Oct 2017 16:48 )    Color: Yellow / Appearance: Slightly Turbid / S.010 / pH: x  Gluc: x / Ketone: Negative  / Bili: Negative / Urobili: 1   Blood: x / Protein: 30 mg/dL / Nitrite: Positive   Leuk Esterase: Moderate / RBC: 2-5 /HPF / WBC >50 /HPF   Sq Epi: x / Non Sq Epi: Few /HPF / Bacteria: Many /HPF      ABG - ( 23 Oct 2017 07:57 )  pH: 7.39  /  pCO2: 41    /  pO2: 139   / HCO3: 24    / Base Excess: 0.1   /  SaO2: 98          Culture - Sputum . (10.22.17 @ 16:26)    Gram Stain:   No polymorphonuclear leukocytes per low power field  No Squamous epithelial cells per low power field  No organisms seen per oil power field    Specimen Source: .Sputum Sputum    Culture - Blood (10.22.17 @ 00:50)    Specimen Source: .Blood Blood-Peripheral    Culture Results:   No growth to date.    Culture - Blood (10.22.17 @ 00:41)    Specimen Source: .Blood Blood-Peripheral    Culture Results:   No growth to date.      CARDIAC MARKERS ( 22 Oct 2017 13:00 )  Culture - Urine (10.21.17 @ 23:33)    Specimen Source: .Urine Catheterized    Culture Results:   10,000 - 49,000 CFU/mL Escherichia coli    1.260 ng/mL / x     / 947 U/L / x     / 8.0 ng/mL  CARDIAC MARKERS ( 22 Oct 2017 03:48 )  1.470 ng/mL / x     / 1094 U/L / x     / 11.0 ng/mL  CARDIAC MARKERS ( 21 Oct 2017 23:53 )  0.580 ng/mL / x     / 1146 U/L / x     / 13.4 ng/mL        Serum Pro-Brain Natriuretic Peptide: 972 pg/mL (10-21-17 @ 23:53)          < from: Xray Chest 1 View AP -PORTABLE-Routine (10.22.17 @ 10:17) >  INTERPRETATION:  CLINICAL INFORMATION: Shortness of breath. Acute   cystitis with hematuria. Evaluate for interval change.    A single portable view of the chest was obtained.     Comparison: 10/21/2017.     The mediastinal cardiac silhouette is unremarkable. Lines and tubes   unchanged.    Patchy opacities throughout the right lung may be slightly less prominent   when compared to the previous exam. Left lung remains clear.    No acute osseous finding.     IMPRESSION:    Findings as above.    < end of copied text >    < from: Xray Chest 1 View AP- PORTABLE-Urgent (10.21.17 @ 22:22) >  IMPRESSION:  Progression of right infiltrate.    Thankyou for the courtesy of this referral.    < end of copied text >    Legionella pneumophila Antigen, Urine (10.22.17 @ 12:18)    Legionella Antigen, Urine: Negative      < from: Transthoracic Echocardiogram (10.22.17 @ 07:56) >  CONCLUSIONS:  1. Normal mitral valve. Mild mitral regurgitation.  2. Normal trileaflet aortic valve. Mild aortic  regurgitation.  3. Aortic Root: 3.8 cm.  4. Normal left atrium.  5. Normal left ventricular internal dimensions and wall  thicknesses.  6. Normal Left Ventricular Systolic Function,  (EF = 55 to  60%)  7. Grade II diastolic dysfunction.  8. Normal right atrium.  9. Normal right ventricular size and function.  10. There is mild tricuspid regurgitation.  11. Normal pericardium with no pericardial effusion.    < end of copied text >

## 2017-10-23 NOTE — PROGRESS NOTE ADULT - ASSESSMENT
86 yo female from home, walks with walker, lives with daughter and granddaughter, being cared for by family, PMH HTN, 'pre-diabetes,' right knee OA, legally blind 2/2 glaucoma/cataracts brought to ED by family for 1-2 days of weakness, and fevers. Patient presented with sepsis likely secondary to right multifocal pneumonia, legionella a concern given 'normal WBC' with bandemia and hyponatremia, positive urinalysis, and elevated LFTs.  Patient was intubated 10/21/2017 for hypoxia and moved to ICU. Patient is s/p extubation today in ICU

## 2017-10-23 NOTE — ADVANCED PRACTICE NURSE CONSULT - RECOMMEDATIONS
-Clean all affected areas with normal saline and apply skin prep to the surrounding skin  -Apply a Foam dressing to the L. Trochanter and L. Flank wound Q 72hrs PRN  Apply Antifungal Moisture Barrier Cream to the Bilateral Gluteus b.i.d. PRN  -Elevate/float the patients heels using heel protectors and reposition the patient Q 2hrs using wedges or pillows

## 2017-10-23 NOTE — CONSULT NOTE ADULT - SUBJECTIVE AND OBJECTIVE BOX
HISTORY OF PRESENT ILLNESS: HPI:  86yo F with pmhx of legal blindness, HTN, and ?prediabetes came in with c/o fever since the past 2-3 days. She said this morning she also felt weak so her family bought her in. She is also SOB. Denies chest pain, abdominal pain, or dysuria. Denies nausea or vomiting. She c/o chronic b/l knee pain due to arthritis. Also states having pain in small ulcer below her left arm along with itching since the past couple of days. Denies cough, but does have phlegm production. No other complaints at this time    Patient is febrile upto 102 in the ED with lactate of 2.1. No leukocytosis or left shift. UA: dirty. CXR- PNA.    Patient is mostly bedbound. Ambulates rarely with walker due to chronic knee pain. Lives with family at home. Family also serves as HHA. (21 Oct 2017 19:30)      PAST MEDICAL & SURGICAL HISTORY:  Legally blind  Arthritis  History of hypertension          MEDICATIONS:  MEDICATIONS  (STANDING):  artificial  tears Solution 1 Drop(s) Both EYES two times a day  heparin  Injectable 5000 Unit(s) SubCutaneous every 8 hours  ipratropium 17 MICROgram(s) HFA Inhaler 1 Puff(s) Inhalation every 6 hours  pantoprazole  Injectable 40 milliGRAM(s) IV Push daily  piperacillin/tazobactam IVPB. 3.375 Gram(s) IV Intermittent every 8 hours  polyethylene glycol 3350 17 Gram(s) Oral at bedtime  senna 2 Tablet(s) Oral at bedtime  sodium chloride 0.9%. 1000 milliLiter(s) (75 mL/Hr) IV Continuous <Continuous>  tiotropium 18 MICROgram(s) Capsule 1 Capsule(s) Inhalation daily      Allergies    No Known Allergies    Intolerances        FAMILY HISTORY:    Non-contributary for premature coronary disease or sudden cardiac death    SOCIAL HISTORY:    [X ] Non-smoker  [ ] Smoker  [ ] Alcohol      REVIEW OF SYSTEMS:  [ ]chest pain  [  ]shortness of breath  [  ]palpitations  [  ]syncope  [ ]near syncope [ ]upper extremity weakness   [ ] lower extremity weakness  [  ]diplopia  [  ]altered mental status   [  ]fevers  [ ]chills [ ]nausea  [ ]vomitting  [  ]dysphagia    [ ]abdominal pain  [ ]melena  [ ]BRBPR    [  ]epistaxis  [  ]rash    [ ]lower extremity edema        [X ] All others negative	  [ ] Unable to obtain    PHYSICAL EXAM:  T(C): 36.2 (10-23-17 @ 05:00), Max: 37.8 (10-22-17 @ 16:00)  HR: 53 (10-23-17 @ 11:00) (48 - 71)  BP: 121/70 (10-23-17 @ 11:00) (104/80 - 155/59)  RR: 19 (10-23-17 @ 11:00) (12 - 23)  SpO2: 100% (10-23-17 @ 11:00) (98% - 100%)  Wt(kg): --  I&O's Summary    22 Oct 2017 07:01  -  23 Oct 2017 07:00  --------------------------------------------------------  IN: 2180.5 mL / OUT: 1855 mL / NET: 325.5 mL    23 Oct 2017 07:01  -  23 Oct 2017 12:05  --------------------------------------------------------  IN: 300 mL / OUT: 250 mL / NET: 50 mL          HEENT:   Normal oral mucosa, PERRL, EOMI	  Lymphatic: No obvious lymphadenopathy ,  Cardiovascular: Normal S1 S2, No JVD,  1/6 TAI murmur , Peripheral pulses palpable 2+ bilaterally  Respiratory: Lungs clear to auscultation, normal effort 	  Gastrointestinal:  Soft, Non-tender, + BS	  Skin: No rashes, No cyanosis, warm to touch  Musculoskeletal: Normal range of motion, normal strength  Psychiatry:  Appropriate Mood & affect     TELEMETRY: 	  Sinus   ECG:  	Sinus 63 BPM  RADIOLOGY:      CXR: sinus APC      LABS:	 	    CARDIAC MARKERS:  CARDIAC MARKERS ( 22 Oct 2017 13:00 )  1.260 ng/mL / x     / 947 U/L / x     / 8.0 ng/mL  CARDIAC MARKERS ( 22 Oct 2017 03:48 )  1.470 ng/mL / x     / 1094 U/L / x     / 11.0 ng/mL  CARDIAC MARKERS ( 21 Oct 2017 23:53 )  0.580 ng/mL / x     / 1146 U/L / x     / 13.4 ng/mL                              10.8   5.9   )-----------( 176      ( 23 Oct 2017 06:35 )             34.5     Hb Trend: 10.8<--    10-23    140  |  106  |  9   ----------------------------<  98  3.8   |  27  |  0.60    Ca    8.1<L>      23 Oct 2017 06:35  Phos  2.4     10-23  Mg     2.0     10-23    TPro  5.9<L>  /  Alb  2.4<L>  /  TBili  0.7  /  DBili  x   /  AST  98<H>  /  ALT  83<H>  /  AlkPhos  132<H>  10-23    Creatinine Trend: 0.60<--, 0.81<--, 0.87<--, 0.97<--    ASSESSMENT/PLAN: 	86y Female mostly bedbound HTN, and ?prediabetes came in with c/o fever since the past 2-3 days. found with sepsis due to UTI and PNA (+) Trop. Normal LV and RV function on echo    - Suspect demand ischemia in the setting of critical illness, would not start heparin gtt  -  ASA, statin if taking PO  - ABx per primary team  - Remainder of cardiac w/u will depend on clinical course    I once again thank you for allowing me to participate in the care of your patient.  If you have any questions or concerns please do not hesitate to contact me.    Tulio Mensah MD, [facility], [facility]  [address] [place] [address]  PHONE:  [phone]  BEEPER : [phone]

## 2017-10-23 NOTE — PROGRESS NOTE ADULT - SUBJECTIVE AND OBJECTIVE BOX
INTERVAL HPI/OVERNIGHT EVENTS: UCx shows <50,000 ecoli, SAT and SBT in AM    PRESSORS: [ ] YES [x ] NO  WHICH:    ANTIBIOTICS: zosyn                 DATE STARTED: 10/21  ANTIBIOTICS: levaquin                 DATE STARTED: 10/21    Antimicrobial:  levoFLOXacin IVPB 500 milliGRAM(s) IV Intermittent every 24 hours  levoFLOXacin IVPB      piperacillin/tazobactam IVPB. 3.375 Gram(s) IV Intermittent every 8 hours    Pulmonary:  ALBUTerol    90 MICROgram(s) HFA Inhaler 2 Puff(s) Inhalation every 6 hours PRN  ipratropium 17 MICROgram(s) HFA Inhaler 1 Puff(s) Inhalation every 6 hours  tiotropium 18 MICROgram(s) Capsule 1 Capsule(s) Inhalation daily    Hematalogic:  heparin  Injectable 5000 Unit(s) SubCutaneous every 8 hours    Other:  acetaminophen    Suspension. 650 milliGRAM(s) Oral every 6 hours PRN  acetaminophen  Suppository 650 milliGRAM(s) Rectal every 6 hours PRN  artificial  tears Solution 1 Drop(s) Both EYES two times a day  pantoprazole  Injectable 40 milliGRAM(s) IV Push daily  polyethylene glycol 3350 17 Gram(s) Oral at bedtime  senna 2 Tablet(s) Oral at bedtime  sodium chloride 0.9%. 1000 milliLiter(s) IV Continuous <Continuous>    acetaminophen    Suspension. 650 milliGRAM(s) Oral every 6 hours PRN  acetaminophen  Suppository 650 milliGRAM(s) Rectal every 6 hours PRN  ALBUTerol    90 MICROgram(s) HFA Inhaler 2 Puff(s) Inhalation every 6 hours PRN  artificial  tears Solution 1 Drop(s) Both EYES two times a day  heparin  Injectable 5000 Unit(s) SubCutaneous every 8 hours  ipratropium 17 MICROgram(s) HFA Inhaler 1 Puff(s) Inhalation every 6 hours  levoFLOXacin IVPB 500 milliGRAM(s) IV Intermittent every 24 hours  levoFLOXacin IVPB      pantoprazole  Injectable 40 milliGRAM(s) IV Push daily  piperacillin/tazobactam IVPB. 3.375 Gram(s) IV Intermittent every 8 hours  polyethylene glycol 3350 17 Gram(s) Oral at bedtime  senna 2 Tablet(s) Oral at bedtime  sodium chloride 0.9%. 1000 milliLiter(s) IV Continuous <Continuous>  tiotropium 18 MICROgram(s) Capsule 1 Capsule(s) Inhalation daily    Drug Dosing Weight  Height (cm): 175.26 (21 Oct 2017 14:24)  Weight (kg): 124.7 (21 Oct 2017 14:24)  BMI (kg/m2): 40.6 (21 Oct 2017 14:24)  BSA (m2): 2.36 (21 Oct 2017 14:24)    CENTRAL LINE: [ ] YES [x ] NO  LOCATION:   DATE INSERTED:  REMOVE: [ ] YES [ ] NO  EXPLAIN:    LONG: [x ] YES [ ] NO    DATE INSERTED: 10/21  REMOVE:  [ ] YES [ ] NO  EXPLAIN:    A-LINE:  [ ] YES [x ] NO  LOCATION:   DATE INSERTED:  REMOVE:  [ ] YES [ ] NO  EXPLAIN:    PMH -reviewed admission note, no change since admission    ICU Vital Signs Last 24 Hrs  T(C): 36.2 (23 Oct 2017 05:00), Max: 37.8 (22 Oct 2017 16:00)  T(F): 97.1 (23 Oct 2017 05:00), Max: 100 (22 Oct 2017 16:00)  HR: 52 (23 Oct 2017 07:00) (48 - 71)  BP: 139/57 (23 Oct 2017 07:00) (102/51 - 144/73)  BP(mean): 76 (23 Oct 2017 07:00) (63 - 89)  ABP: --  ABP(mean): --  RR: 14 (23 Oct 2017 07:00) (12 - 21)  SpO2: 100% (23 Oct 2017 07:00) (98% - 100%)      ABG - ( 22 Oct 2017 05:11 )  pH: 7.38  /  pCO2: 44    /  pO2: 147   / HCO3: 25    / Base Excess: 0.6   /  SaO2: 99        10-22 @ 07:01  -  10-23 @ 07:00  --------------------------------------------------------  IN: 2180.5 mL / OUT: 1855 mL / NET: 325.5 mL      Mode: AC/ CMV (Assist Control/ Continuous Mandatory Ventilation)  RR (machine): 12  TV (machine): 500  FiO2: 40  PEEP: 5  ITime: 1  MAP: 10  PIP: 26    PHYSICAL EXAM:    GENERAL:   HEAD: atraumatic, normocephalic   EYES: PERRLA, white sclera.   ENMT: nasal mucosa-moist, Oral cavity- no exudate  NECK: supple, JVD/ no JVD  SKIN: warm, dry   CHEST/LUNG:  R rhonchi  HEART: RRR, no m/r/g   ABDOMEN: soft, nontender, nondistended; bowel sounds.  : long catheter.  EXTREMITIES: no peripheral edema, no cyanosis, no clubbing.  NEURO: arousable, off sedation, no focal neuro deficits    LABS:  CBC Full  -  ( 23 Oct 2017 06:35 )  WBC Count : 5.9 K/uL  Hemoglobin : 10.8 g/dL  Hematocrit : 34.5 %  Platelet Count - Automated : 176 K/uL  Mean Cell Volume : 96.1 fl  Mean Cell Hemoglobin : 30.1 pg  Mean Cell Hemoglobin Concentration : 31.3 gm/dL  Auto Neutrophil # : 3.2 K/uL  Auto Lymphocyte # : 1.7 K/uL  Auto Monocyte # : 0.7 K/uL  Auto Eosinophil # : 0.4 K/uL  Auto Basophil # : 0.1 K/uL  Auto Neutrophil % : 53.7 %  Auto Lymphocyte % : 28.0 %  Auto Monocyte % : 11.1 %  Auto Eosinophil % : 6.2 %  Auto Basophil % : 1.1 %    10-23    140  |  106  |  9   ----------------------------<  98  3.8   |  27  |  0.60    Ca    8.1<L>      23 Oct 2017 06:35  Phos  2.4     10-23  Mg     2.0     10-23    TPro  5.9<L>  /  Alb  2.4<L>  /  TBili  0.7  /  DBili  x   /  AST  98<H>  /  ALT  83<H>  /  AlkPhos  132<H>  10-23    PT/INR - ( 21 Oct 2017 23:44 )   PT: 12.6 sec;   INR: 1.15 ratio      PTT - ( 21 Oct 2017 23:44 )  PTT:30.2 sec  Urinalysis Basic - ( 21 Oct 2017 16:48 )    Color: Yellow / Appearance: Slightly Turbid / S.010 / pH: x  Gluc: x / Ketone: Negative  / Bili: Negative / Urobili: 1   Blood: x / Protein: 30 mg/dL / Nitrite: Positive   Leuk Esterase: Moderate / RBC: 2-5 /HPF / WBC >50 /HPF   Sq Epi: x / Non Sq Epi: Few /HPF / Bacteria: Many /HPF    Culture Results:   No growth to date. (10-22 @ 00:50)  Culture Results:   No growth to date. (10-22 @ 00:41)  Culture Results:   10,000 - 49,000 CFU/mL Escherichia coli (10-21 @ 23:33)    RADIOLOGY & ADDITIONAL STUDIES REVIEWED:      < from: Xray Chest 1 View AP -PORTABLE-Routine (10.22.17 @ 10:17) >  The mediastinal cardiac silhouette is unremarkable. Lines and tubes   unchanged.    Patchy opacities throughout the right lung may be slightly less prominent   when compared to the previous exam. Left lung remains clear.    No acute osseous finding.     IMPRESSION:    Findings as above.    < end of copied text >    [ ]GOALS OF CARE DISCUSSION WITH PATIENT/FAMILY/PROXY: full code, daughter at bedside    CRITICAL CARE TIME SPENT: 35 minutes

## 2017-10-23 NOTE — PROGRESS NOTE ADULT - ATTENDING COMMENTS
85 year old female who p/w aspiration PNA and required intubation. hospital course complicated with elevated trop and possible GIB, with no active bleeding noted at this time.    This am pt was on cpap, alert, responsive, -450 RR 23 Fio2 40%, RSBI ~60    pt subsequently extubated and doing well.    assessment  acute resp failure s/p extubation today      plan  change abx to zosyn  cardio fo/ fro elev trop  gi f/u - monitor cbc 86 year old female who p/w aspiration PNA and required intubation. hospital course complicated with elevated trop and possible GIB, with no active bleeding noted at this time.    This am pt was on cpap, alert, responsive, -450 RR 23 Fio2 40%, RSBI ~60    pt subsequently extubated and doing well.    assessment  acute resp failure s/p extubation today      plan  change abx to zosyn  cardio fo/ fro elev trop  gi f/u - monitor cbc

## 2017-10-23 NOTE — ADVANCED PRACTICE NURSE CONSULT - ASSESSMENT
This is a 86yr old female patient admitted for Acute Cystitis, presenting with the following:  -There is a L Trochanter Stage 2 Pressure Injury (2cm x 1cm) with pink tissue and fibrinous exudate present  -There is a healing abrasion to the L. Flank area with red tissue, scant drainage, and surrounding tissue hypopigmentation present  -There is evidence of Incontinence Associated Dermatitis to the Bilateral Gluteus

## 2017-10-23 NOTE — PROGRESS NOTE ADULT - PROBLEM SELECTOR PLAN 3
- currently on broad spectrum antibiotics with IV zosyn   - Is s/p long  - UCx = -<50,000 ecoli   - ID following

## 2017-10-23 NOTE — DIETITIAN INITIAL EVALUATION ADULT. - PROBLEM SELECTOR PLAN 1
Multiple sources: UTI, and PNA  - lactate 2.1 on admission, trended to 1.0 after 3700ml NS bolus  - f/u blood cx  -U/A positive  - f/u urine cx  - CXR shows RLL PNA  - start IV CTX and azithromycin  - duonebs   - supplemental O2 as needed  - f/u RVP  - Tylenol 650 q6hr around the clock for now  - NS@75ml/hr for now

## 2017-10-23 NOTE — PROGRESS NOTE ADULT - PROBLEM SELECTOR PLAN 2
right sided, most likely contributed to fever on presentation  CURB-65 score = 3  concern for legionella given hyponatremia and relative leukopenia with bandemia  currently on IV antibiotics- zosyn; per ID consult and ICU team  -negative urine legionella,   -f/u strep antigen,  procalcitonin  -negative BCx, sputum culture, RVP thus far    - follow up urine legionella, strep antigen, sputum culture, procalcitonin, RVP  - follow up Blood Cultures  - ID consulted by ICU team

## 2017-10-23 NOTE — PROGRESS NOTE ADULT - SUBJECTIVE AND OBJECTIVE BOX
Medicine attending note:    Patient is a 85y old  Female who presents with a chief complaint of Fever (21 Oct 2017 19:30)    Patient was extubated today in ICU, doing well. Patient reports she is hungry, otherwise denies any complaints at time of evaluation.  Grandson at bedside    MEDICATIONS  (STANDING):  artificial  tears Solution 1 Drop(s) Both EYES two times a day  heparin  Injectable 5000 Unit(s) SubCutaneous every 8 hours  ipratropium 17 MICROgram(s) HFA Inhaler 1 Puff(s) Inhalation every 6 hours  nystatin Powder 1 Application(s) Topical three times a day  pantoprazole  Injectable 40 milliGRAM(s) IV Push daily  piperacillin/tazobactam IVPB. 3.375 Gram(s) IV Intermittent every 8 hours  polyethylene glycol 3350 17 Gram(s) Oral at bedtime  senna 2 Tablet(s) Oral at bedtime  tiotropium 18 MICROgram(s) Capsule 1 Capsule(s) Inhalation daily    MEDICATIONS  (PRN):  acetaminophen    Suspension. 650 milliGRAM(s) Oral every 6 hours PRN Mild Pain (1 - 3)  acetaminophen  Suppository 650 milliGRAM(s) Rectal every 6 hours PRN For Temp greater than 38 C (100.4 F)  ALBUTerol    90 MICROgram(s) HFA Inhaler 2 Puff(s) Inhalation every 6 hours PRN Shortness of Breath and/or Wheezing           PHYSICAL EXAM:    T(C): 37.8 (10-23-17 @ 15:48), Max: 37.8 (10-23-17 @ 15:48)  HR: 57 (10-23-17 @ 18:00) (48 - 72)  BP: 145/58 (10-23-17 @ 18:00) (104/80 - 174/72)  RR: 26 (10-23-17 @ 18:00) (12 - 26)  SpO2: 100% (10-23-17 @ 18:00) (98% - 100%)    I&O's Summary    22 Oct 2017 07:01  -  23 Oct 2017 07:00  --------------------------------------------------------  IN: 2180.5 mL / OUT: 1855 mL / NET: 325.5 mL    23 Oct 2017 07:01  -  23 Oct 2017 18:52  --------------------------------------------------------  IN: 700 mL / OUT: 2575 mL / NET: -1875 mL        GENERAL: Elderly female, NAD, s/p extubation, well-groomed, well-developed  HEAD:  Atraumatic, Normocephalic  EYES:  conjunctiva and sclera clear  ENMT: Moist mucous membranes, No lesions  NECK: Supple, No JVD, Normal thyroid  NERVOUS SYSTEM:  Awake, alert, no focal deficit  CHEST/LUNG: Decreased breath sounds on right side, otherwise clear to auscultation anteriorly; No rales, rhonchi, wheezing, or rubs  HEART: Regular rate and rhythm; No murmurs, rubs, or gallops  ABDOMEN: Soft, Nontender, Nondistended; Bowel sounds present  EXTREMITIES:  2+ Peripheral Pulses, well healed surgical scar right foot, No clubbing, cyanosis,+ edema  LYMPH: No lymphadenopathy noted        LABS:                           10.8   5.9   )-----------( 176      ( 23 Oct 2017 06:35 )             34.5     10-23    140  |  106  |  9   ----------------------------<  98  3.8   |  27  |  0.60    Ca    8.1<L>      23 Oct 2017 06:35  Phos  2.4     10-23  Mg     2.0     10-23    TPro  5.9<L>  /  Alb  2.4<L>  /  TBili  0.7  /  DBili  x   /  AST  98<H>  /  ALT  83<H>  /  AlkPhos  132<H>  10-23    PT/INR - ( 21 Oct 2017 23:44 )   PT: 12.6 sec;   INR: 1.15 ratio         PTT - ( 21 Oct 2017 23:44 )  PTT:30.2 sec    ABG - ( 23 Oct 2017 10:34 )  pH: 7.38  /  pCO2: 43    /  pO2: 132   / HCO3: 25    / Base Excess: 0.7   /  SaO2: 98              RADIOLOGY & ADDITIONAL TESTS:    < from: Xray Chest 1 View AP/PA (10.21.17 @ 15:36) >  IMPRESSION:    right perihilar lower lobe patchy airspace consolidation.   Cardiomegaly. Infectious pneumonia should be considered..          < end of copied text >    < from: US Duplex Venous Lower Ext Complete, Bilateral (10.21.17 @ 21:27) >  IMPRESSION:  No evidence of deep vein thrombosis in the visualized veins   of either leg. Poor visualization of bilateral mid to distal femoral and   calf veins.    < end of copied text >    [x ]GOALS OF CARE DISCUSSION WITH PATIENT/FAMILY/PROXY: full code    Imaging Personally Reviewed:  [x ] YES  [ ] NO    Consultant(s) Notes Reviewed:  [x] YES  [ ] NO    Care Discussed with Consultants/Other Providers [x] YES  [ ] NO

## 2017-10-23 NOTE — DIETITIAN INITIAL EVALUATION ADULT. - PROBLEM SELECTOR PLAN 2
RLL PNA vs PE   - Wells score- 3  - f/u venous doppler  - f/u CTA  - Duonebs  - supplemental O2  - IV CTX and azithromycin

## 2017-10-23 NOTE — PROGRESS NOTE ADULT - ASSESSMENT
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    Patient presents with sepsis likely 2/2 right multifocal pneumonia, legionella a concern given 'normal WBC' with bandemia and hyponatremia and elevated LFT.      Problem/Recommendation - 1:  Problem: Acute respiratory failure with hypoxia. Recommendation: 2/2 right multifocal pneumonia, severe, requiring mechanical ventilation  CURB-65 score = 3  - continue vent support, will try to extubate tomorrow  concern for legionella given hyponatremia and relative leukopenia with bandemia  s/p vancomycin 1 gm and zosyn in ed  - c/w zosyn; levaquin added to cover atypicals and possible legionella  -negative urine legionella,   f/u strep antigen, sputum culture, procalcitonin, RVP  -negative BCx     Problem/Recommendation - 2:  ·  Problem: Sepsis, due to unspecified organism.  Recommendation: 2/3 qsofa criteria met (change in mental status, tachypnea) 2/2 right multifocal pneumonia and UTI  lactate normalized after 3700cc fluid  - fluids @ 75  - broad spectrum with zosyn and levaquin  - long  -UCx <50,000 e.coli  -blood culture neg  - consent for central line obtained by HCP in case deterioration.      Problem/Recommendation - 3:  ·  Problem: Urinary tract infection without hematuria, site unspecified.  Recommendation: likely contributing to sepsis  - on zosyn  -<50,000 ecoli UCx.      Problem/Recommendation - 4:  ·  Problem: Gastrointestinal hemorrhage, unspecified gastrointestinal hemorrhage type.  Recommendation: bright red blood via NGT 2/2 ? traumatic intubation, possibly UGIB  - protonix gtt  - cbc q6  - GI consult Dr. Cedillo will consider EGD after patient is stable     Problem/Recommendation - 5:  ·  Problem: Transaminitis.  Recommendation: possibly 2/2 legionella pneumonia  not obstructive picture  possibly 2/2 sepsis, hepatic congestion 2/2 CHF less likely  - follow up viral hepatitis panel in am  - monitor.      Problem/Recommendation - 6:  Problem: Hyponatremia. Recommendation: possibly 2/2 legionella pneumonia  hypovolemic hyponatremia  - NS IVF  - check urine osm and Na.     Problem/Recommendation - 7:  Problem: History of hypertension. Recommendation: Bp stable for now  - hold hyzaar in context of sepsis.     Problem/Recommendation - 8:  Problem: Need for prophylactic measure. Recommendation: bleeding from NGT   improve score =3, will hold off on chemical DVT ppx 2/2 possible GIB  compression boots and protonix gtt. 85yo female from home, walks with walker, lives with daughter and granddaughter, being cared for by family, PMH HTN, 'pre-diabetes,' right knee OA, legally blind 2/2 glaucoma/cataracts brought to ED by family 2/2 1-2 days of weakness, subjective fever, chills.    Patient presents with sepsis likely 2/2 right multifocal pneumonia, legionella a concern given 'normal WBC' with bandemia and hyponatremia and elevated LFT.      Problem/Recommendation - 1:  Problem: Acute respiratory failure with hypoxia. Recommendation: 2/2 right multifocal pneumonia, severe, requiring mechanical ventilation  CURB-65 score = 3  - continue vent support, will try to extubate tomorrow  concern for legionella given hyponatremia and relative leukopenia with bandemia  s/p vancomycin 1 gm and zosyn in ed  - c/w zosyn; levaquin added to cover atypicals and possible legionella  -negative urine legionella,   f/u strep antigen, sputum culture, procalcitonin, RVP  -negative BCx     Problem/Recommendation - 2:  ·  Problem: Sepsis, due to unspecified organism.  Recommendation: 2/3 qsofa criteria met (change in mental status, tachypnea) 2/2 right multifocal pneumonia and UTI  lactate normalized after 3700cc fluid  - fluids @ 75  - broad spectrum with zosyn and levaquin  - long  -UCx <50,000 e.coli  -blood culture neg  - consent for central line obtained by HCP in case deterioration.      Problem/Recommendation - 3:  ·  Problem: Urinary tract infection without hematuria, site unspecified.  Recommendation: likely contributing to sepsis  - on zosyn  -<50,000 ecoli UCx.      Problem/Recommendation - 4:  ·  Problem: Gastrointestinal hemorrhage, unspecified gastrointestinal hemorrhage type.  Recommendation: bright red blood via NGT 2/2 ? traumatic intubation, possibly UGIB  - protonix gtt  - cbc q6  - GI consult Dr. Cedillo will consider EGD after patient is stable     Problem/Recommendation - 5:  ·  Problem: Transaminitis.  Recommendation: possibly 2/2 legionella pneumonia  not obstructive picture  possibly 2/2 sepsis, hepatic congestion 2/2 CHF less likely  - follow up viral hepatitis panel in am  - monitor.      Problem/Recommendation - 6:  Problem: Hyponatremia. Recommendation: possibly 2/2 legionella pneumonia  hypovolemic hyponatremia  - NS IVF  - check urine osm and Na.     Problem/Recommendation - 7:  Problem: History of hypertension. Recommendation: Bp stable for now  - hold hyzaar in context of sepsis.     Problem/Recommendation - 8:  Problem: Need for prophylactic measure. Recommendation: bleeding from NGT   improve score =3, will hold off on chemical DVT ppx 2/2 possible GIB  compression boots and protonix gtt.

## 2017-10-23 NOTE — PROGRESS NOTE ADULT - ASSESSMENT
84yo F with pmhx of legal blindness, HTN, and ?prediabetes came in with c/o fever since the past 2-3 days and looking increasingly weak on day of admission. Pt was initially given ceftriaxone and azithro for covering CAP and UTI as CXR showed right sided infiltrate and u/a was positive.   pt was code sepsis in ER. she had WBC 4k with 13% bands, elevated transaminases and mildly elevated lactate. Patient had RR on floor for respiratory distress and AMS> she had hypoxia to 75% and was intubated by anesthesia and now in ICU.   ABX was broadened to zosyn and levofloxacin    Impression:   1. right sided pneumonia, community acquired. CXR from yesterday improved. Blood culture and urine legionella ag negative. sputum culture pending.   2. acute hypoxic respiratory failure sec to pneumonia  3. positive urinalysis: asymptomatic, u/a collection(from bedpan) unlikley clean catch. growing e coli.    Plan:  ·	continue Zosyn and levofloxacin for now  ·	ICU care.   ·	f/u final urine and sputum cultures.     Case discussed in detail with the ICU team and family.   Thanks for this consultation. please call me if any questions at [phone].    ·	follow up blood cultures and urine cultures  ·	follow up urine legionella ag and send pneumococcal ag  ·	if any ET tube secretions send it for gram stain and culture.   ·	continue Zosyn and levofloxacin for now  ·	ICU care. 84yo F with pmhx of legal blindness, HTN, and ?prediabetes came in with c/o fever since the past 2-3 days and looking increasingly weak on day of admission. Pt was initially given ceftriaxone and azithro for covering CAP and UTI as CXR showed right sided infiltrate and u/a was positive.   pt was code sepsis in ER. she had WBC 4k with 13% bands, elevated transaminases and mildly elevated lactate. Patient had RR on floor for respiratory distress and AMS> she had hypoxia to 75% and was intubated by anesthesia and now in ICU.   ABX was broadened to zosyn and levofloxacin    Impression:   1. right sided pneumonia, community acquired. CXR from yesterday improved. Blood culture and urine legionella ag negative. sputum culture pending.   2. acute hypoxic respiratory failure sec to pneumonia  3. positive urinalysis: asymptomatic, u/a collection(from bedpan) unlikley clean catch. growing e coli.    Plan:  ·	continue present abx, narrow as per culture results.   ·	ICU care.   ·	f/u final urine and sputum cultures.     Case discussed in detail with the ICU team and family.   Thanks for this consultation. please call me if any questions at [phone].    ·	follow up blood cultures and urine cultures  ·	follow up urine legionella ag and send pneumococcal ag  ·	if any ET tube secretions send it for gram stain and culture.   ·	continue Zosyn and levofloxacin for now  ·	ICU care. 85yo F with pmhx of legal blindness, HTN, and ?prediabetes came in with c/o fever since the past 2-3 days and looking increasingly weak on day of admission. Pt was initially given ceftriaxone and azithro for covering CAP and UTI as CXR showed right sided infiltrate and u/a was positive.   pt was code sepsis in ER. she had WBC 4k with 13% bands, elevated transaminases and mildly elevated lactate. Patient had RR on floor for respiratory distress and AMS> she had hypoxia to 75% and was intubated by anesthesia and now in ICU.   ABX was broadened to zosyn and levofloxacin    Impression:   1. right sided pneumonia, community acquired. CXR from yesterday improved. Blood culture and urine legionella ag negative. sputum culture pending.   2. acute hypoxic respiratory failure sec to pneumonia  3. positive urinalysis: asymptomatic, u/a collection(from bedpan) unlikley clean catch. growing e coli.    Plan:  ·	continue present abx, narrow as per culture results.   ·	ICU care.   ·	f/u final urine and sputum cultures.     Case discussed in detail with the ICU team and family.   Thanks for this consultation. please call me if any questions at [phone].    ·	follow up blood cultures and urine cultures  ·	follow up urine legionella ag and send pneumococcal ag  ·	if any ET tube secretions send it for gram stain and culture.   ·	continue Zosyn and levofloxacin for now  ·	ICU care.

## 2017-10-24 LAB
ALBUMIN SERPL ELPH-MCNC: 2.5 G/DL — LOW (ref 3.5–5)
ALP SERPL-CCNC: 131 U/L — HIGH (ref 40–120)
ALT FLD-CCNC: 72 U/L DA — HIGH (ref 10–60)
ANION GAP SERPL CALC-SCNC: 6 MMOL/L — SIGNIFICANT CHANGE UP (ref 5–17)
AST SERPL-CCNC: 81 U/L — HIGH (ref 10–40)
BASOPHILS # BLD AUTO: 0.1 K/UL — SIGNIFICANT CHANGE UP (ref 0–0.2)
BASOPHILS NFR BLD AUTO: 1.6 % — SIGNIFICANT CHANGE UP (ref 0–2)
BILIRUB SERPL-MCNC: 0.9 MG/DL — SIGNIFICANT CHANGE UP (ref 0.2–1.2)
BUN SERPL-MCNC: 9 MG/DL — SIGNIFICANT CHANGE UP (ref 7–18)
CALCIUM SERPL-MCNC: 8.7 MG/DL — SIGNIFICANT CHANGE UP (ref 8.4–10.5)
CHLORIDE SERPL-SCNC: 102 MMOL/L — SIGNIFICANT CHANGE UP (ref 96–108)
CO2 SERPL-SCNC: 31 MMOL/L — SIGNIFICANT CHANGE UP (ref 22–31)
CREAT SERPL-MCNC: 0.66 MG/DL — SIGNIFICANT CHANGE UP (ref 0.5–1.3)
CULTURE RESULTS: SIGNIFICANT CHANGE UP
EOSINOPHIL # BLD AUTO: 0.8 K/UL — HIGH (ref 0–0.5)
EOSINOPHIL NFR BLD AUTO: 12.9 % — HIGH (ref 0–6)
GLUCOSE SERPL-MCNC: 85 MG/DL — SIGNIFICANT CHANGE UP (ref 70–99)
HBA1C BLD-MCNC: 6.5 % — HIGH (ref 4–5.6)
HCT VFR BLD CALC: 34.3 % — LOW (ref 34.5–45)
HGB BLD-MCNC: 11.2 G/DL — LOW (ref 11.5–15.5)
LYMPHOCYTES # BLD AUTO: 1.8 K/UL — SIGNIFICANT CHANGE UP (ref 1–3.3)
LYMPHOCYTES # BLD AUTO: 29.7 % — SIGNIFICANT CHANGE UP (ref 13–44)
MAGNESIUM SERPL-MCNC: 2 MG/DL — SIGNIFICANT CHANGE UP (ref 1.6–2.6)
MCHC RBC-ENTMCNC: 31.1 PG — SIGNIFICANT CHANGE UP (ref 27–34)
MCHC RBC-ENTMCNC: 32.6 GM/DL — SIGNIFICANT CHANGE UP (ref 32–36)
MCV RBC AUTO: 95.6 FL — SIGNIFICANT CHANGE UP (ref 80–100)
MONOCYTES # BLD AUTO: 0.5 K/UL — SIGNIFICANT CHANGE UP (ref 0–0.9)
MONOCYTES NFR BLD AUTO: 8.5 % — SIGNIFICANT CHANGE UP (ref 2–14)
NEUTROPHILS # BLD AUTO: 2.9 K/UL — SIGNIFICANT CHANGE UP (ref 1.8–7.4)
NEUTROPHILS NFR BLD AUTO: 47.4 % — SIGNIFICANT CHANGE UP (ref 43–77)
PHOSPHATE SERPL-MCNC: 2.3 MG/DL — LOW (ref 2.5–4.5)
PLATELET # BLD AUTO: 208 K/UL — SIGNIFICANT CHANGE UP (ref 150–400)
POTASSIUM SERPL-MCNC: 3.4 MMOL/L — LOW (ref 3.5–5.3)
POTASSIUM SERPL-SCNC: 3.4 MMOL/L — LOW (ref 3.5–5.3)
PROT SERPL-MCNC: 6.3 G/DL — SIGNIFICANT CHANGE UP (ref 6–8.3)
RBC # BLD: 3.59 M/UL — LOW (ref 3.8–5.2)
RBC # FLD: 11.8 % — SIGNIFICANT CHANGE UP (ref 10.3–14.5)
S PNEUM AG SER QL: SIGNIFICANT CHANGE UP
SODIUM SERPL-SCNC: 139 MMOL/L — SIGNIFICANT CHANGE UP (ref 135–145)
SPECIMEN SOURCE: SIGNIFICANT CHANGE UP
WBC # BLD: 6.1 K/UL — SIGNIFICANT CHANGE UP (ref 3.8–10.5)
WBC # FLD AUTO: 6.1 K/UL — SIGNIFICANT CHANGE UP (ref 3.8–10.5)

## 2017-10-24 PROCEDURE — 71010: CPT | Mod: 26

## 2017-10-24 RX ORDER — POTASSIUM CHLORIDE 20 MEQ
40 PACKET (EA) ORAL EVERY 4 HOURS
Qty: 0 | Refills: 0 | Status: COMPLETED | OUTPATIENT
Start: 2017-10-24 | End: 2017-10-24

## 2017-10-24 RX ORDER — HYDROCHLOROTHIAZIDE 25 MG
12.5 TABLET ORAL DAILY
Qty: 0 | Refills: 0 | Status: DISCONTINUED | OUTPATIENT
Start: 2017-10-24 | End: 2017-10-26

## 2017-10-24 RX ADMIN — HEPARIN SODIUM 5000 UNIT(S): 5000 INJECTION INTRAVENOUS; SUBCUTANEOUS at 05:53

## 2017-10-24 RX ADMIN — Medication 3 MILLILITER(S): at 20:24

## 2017-10-24 RX ADMIN — POLYETHYLENE GLYCOL 3350 17 GRAM(S): 17 POWDER, FOR SOLUTION ORAL at 21:30

## 2017-10-24 RX ADMIN — NYSTATIN CREAM 1 APPLICATION(S): 100000 CREAM TOPICAL at 21:29

## 2017-10-24 RX ADMIN — Medication 1 DROP(S): at 18:38

## 2017-10-24 RX ADMIN — Medication 12.5 MILLIGRAM(S): at 15:14

## 2017-10-24 RX ADMIN — HEPARIN SODIUM 5000 UNIT(S): 5000 INJECTION INTRAVENOUS; SUBCUTANEOUS at 15:15

## 2017-10-24 RX ADMIN — NYSTATIN CREAM 1 APPLICATION(S): 100000 CREAM TOPICAL at 05:55

## 2017-10-24 RX ADMIN — Medication 40 MILLIEQUIVALENT(S): at 08:45

## 2017-10-24 RX ADMIN — NYSTATIN CREAM 1 APPLICATION(S): 100000 CREAM TOPICAL at 15:15

## 2017-10-24 RX ADMIN — Medication 3 MILLILITER(S): at 09:13

## 2017-10-24 RX ADMIN — SENNA PLUS 2 TABLET(S): 8.6 TABLET ORAL at 21:29

## 2017-10-24 RX ADMIN — PANTOPRAZOLE SODIUM 40 MILLIGRAM(S): 20 TABLET, DELAYED RELEASE ORAL at 08:44

## 2017-10-24 RX ADMIN — Medication 1 DROP(S): at 05:55

## 2017-10-24 RX ADMIN — PIPERACILLIN AND TAZOBACTAM 25 GRAM(S): 4; .5 INJECTION, POWDER, LYOPHILIZED, FOR SOLUTION INTRAVENOUS at 05:53

## 2017-10-24 RX ADMIN — LOSARTAN POTASSIUM 25 MILLIGRAM(S): 100 TABLET, FILM COATED ORAL at 05:54

## 2017-10-24 RX ADMIN — Medication 3 MILLILITER(S): at 14:25

## 2017-10-24 RX ADMIN — Medication 40 MILLIEQUIVALENT(S): at 11:32

## 2017-10-24 RX ADMIN — Medication 3 MILLILITER(S): at 03:23

## 2017-10-24 RX ADMIN — HEPARIN SODIUM 5000 UNIT(S): 5000 INJECTION INTRAVENOUS; SUBCUTANEOUS at 21:29

## 2017-10-24 NOTE — PROGRESS NOTE ADULT - NSHPATTENDINGPLANDISCUSS_GEN_ALL_CORE
patient and grandson at bedside, and agrees, all questions answered patient and granddaughter at bedside, and agrees, all questions answered

## 2017-10-24 NOTE — SWALLOW BEDSIDE ASSESSMENT ADULT - ORAL PHASE
Decreased anterior-posterior movement of the bolus/Delayed oral transit time Lingual stasis/Decreased anterior-posterior movement of the bolus/Delayed oral transit time Palatal stasis/Delayed oral transit time/Decreased anterior-posterior movement of the bolus/Lingual stasis Delayed oral transit time/Decreased anterior-posterior movement of the bolus

## 2017-10-24 NOTE — PROGRESS NOTE ADULT - SUBJECTIVE AND OBJECTIVE BOX
Medicine attending note:  Patient seen in ICU    Patient is a 85y old  Female who presents with a chief complaint of Fever (21 Oct 2017 19:30)    Patient was extubated yesterday in ICU, doing well. Patient reports she is tolerating diet.     MEDICATIONS  (STANDING):  artificial  tears Solution 1 Drop(s) Both EYES two times a day  heparin  Injectable 5000 Unit(s) SubCutaneous every 8 hours  ipratropium 17 MICROgram(s) HFA Inhaler 1 Puff(s) Inhalation every 6 hours  nystatin Powder 1 Application(s) Topical three times a day  pantoprazole  Injectable 40 milliGRAM(s) IV Push daily  piperacillin/tazobactam IVPB. 3.375 Gram(s) IV Intermittent every 8 hours  polyethylene glycol 3350 17 Gram(s) Oral at bedtime  senna 2 Tablet(s) Oral at bedtime  tiotropium 18 MICROgram(s) Capsule 1 Capsule(s) Inhalation daily    MEDICATIONS  (PRN):  acetaminophen    Suspension. 650 milliGRAM(s) Oral every 6 hours PRN Mild Pain (1 - 3)  acetaminophen  Suppository 650 milliGRAM(s) Rectal every 6 hours PRN For Temp greater than 38 C (100.4 F)  ALBUTerol    90 MICROgram(s) HFA Inhaler 2 Puff(s) Inhalation every 6 hours PRN Shortness of Breath and/or Wheezing           PHYSICAL EXAM:    T(C): 37.8 (10-23-17 @ 15:48), Max: 37.8 (10-23-17 @ 15:48)  HR: 57 (10-23-17 @ 18:00) (48 - 72)  BP: 145/58 (10-23-17 @ 18:00) (104/80 - 174/72)  RR: 26 (10-23-17 @ 18:00) (12 - 26)  SpO2: 100% (10-23-17 @ 18:00) (98% - 100%)    I&O's Summary    22 Oct 2017 07:01  -  23 Oct 2017 07:00  --------------------------------------------------------  IN: 2180.5 mL / OUT: 1855 mL / NET: 325.5 mL    23 Oct 2017 07:01  -  23 Oct 2017 18:52  --------------------------------------------------------  IN: 700 mL / OUT: 2575 mL / NET: -1875 mL        GENERAL: Elderly female, NAD, s/p extubation, well-groomed, well-developed  HEAD:  Atraumatic, Normocephalic  EYES:  conjunctiva and sclera clear  ENMT: Moist mucous membranes, No lesions  NECK: Supple, No JVD, Normal thyroid  NERVOUS SYSTEM:  Awake, alert, no focal deficit  CHEST/LUNG: Decreased breath sounds on right side, otherwise clear to auscultation anteriorly; No rales, rhonchi, wheezing, or rubs  HEART: Regular rate and rhythm; No murmurs, rubs, or gallops  ABDOMEN: Soft, Nontender, Nondistended; Bowel sounds present  EXTREMITIES:  2+ Peripheral Pulses, well healed surgical scar right foot, No clubbing, cyanosis,+ edema  LYMPH: No lymphadenopathy noted        LABS:                           10.8   5.9   )-----------( 176      ( 23 Oct 2017 06:35 )             34.5     10-23    140  |  106  |  9   ----------------------------<  98  3.8   |  27  |  0.60    Ca    8.1<L>      23 Oct 2017 06:35  Phos  2.4     10-23  Mg     2.0     10-23    TPro  5.9<L>  /  Alb  2.4<L>  /  TBili  0.7  /  DBili  x   /  AST  98<H>  /  ALT  83<H>  /  AlkPhos  132<H>  10-23    PT/INR - ( 21 Oct 2017 23:44 )   PT: 12.6 sec;   INR: 1.15 ratio         PTT - ( 21 Oct 2017 23:44 )  PTT:30.2 sec    ABG - ( 23 Oct 2017 10:34 )  pH: 7.38  /  pCO2: 43    /  pO2: 132   / HCO3: 25    / Base Excess: 0.7   /  SaO2: 98              RADIOLOGY & ADDITIONAL TESTS:    < from: Xray Chest 1 View AP/PA (10.21.17 @ 15:36) >  IMPRESSION:    right perihilar lower lobe patchy airspace consolidation.   Cardiomegaly. Infectious pneumonia should be considered..          < end of copied text >    < from: US Duplex Venous Lower Ext Complete, Bilateral (10.21.17 @ 21:27) >  IMPRESSION:  No evidence of deep vein thrombosis in the visualized veins   of either leg. Poor visualization of bilateral mid to distal femoral and   calf veins.    < end of copied text >    [x ]GOALS OF CARE DISCUSSION WITH PATIENT/FAMILY/PROXY: full code    Imaging Personally Reviewed:  [x ] YES  [ ] NO    Consultant(s) Notes Reviewed:  [x] YES  [ ] NO    Care Discussed with Consultants/Other Providers [x] YES  [ ] NO Medicine attending note:  Patient seen in ICU    Patient is a 85y old  Female who presents with a chief complaint of Fever (21 Oct 2017 19:30)    Patient was extubated yesterday in ICU, doing well. Patient reports she is tolerating diet well and had 3 meals.   Grand daughter at bedside    MEDICATIONS  (STANDING):  ALBUTerol/ipratropium for Nebulization 3 milliLiter(s) Nebulizer every 6 hours  artificial  tears Solution 1 Drop(s) Both EYES two times a day  heparin  Injectable 5000 Unit(s) SubCutaneous every 8 hours  hydrochlorothiazide 12.5 milliGRAM(s) Oral daily  levoFLOXacin IVPB      losartan 25 milliGRAM(s) Oral daily  nystatin Powder 1 Application(s) Topical three times a day  pantoprazole    Tablet 40 milliGRAM(s) Oral before breakfast  polyethylene glycol 3350 17 Gram(s) Oral at bedtime  senna 2 Tablet(s) Oral at bedtime    MEDICATIONS  (PRN):  acetaminophen   Tablet. 650 milliGRAM(s) Oral every 6 hours PRN Mild Pain (1 - 3)  acetaminophen  Suppository 650 milliGRAM(s) Rectal every 6 hours PRN For Temp greater than 38 C (100.4 F)      REVIEW OF SYSTEMS:  CONSTITUTIONAL: No fever, weight loss, or fatigue  EYES: No eye pain,  or discharge  ENMT:  No tinnitus, vertigo; No sinus or throat pain  NECK: No pain or stiffness  RESPIRATORY: No cough, wheezing, chills or hemoptysis; No shortness of breath  CARDIOVASCULAR: No chest pain, palpitations, dizziness, or leg swelling  GASTROINTESTINAL: No abdominal or epigastric pain. No nausea, vomiting, or hematemesis; No diarrhea or constipation. No melena or hematochezia.  GENITOURINARY: No dysuria, frequency, hematuria, or incontinence  NEUROLOGICAL: No headaches, memory loss, loss of strength, numbness, or tremors  SKIN: No itching, burning, rashes, or lesions   LYMPH NODES: No enlarged glands  ENDOCRINE: No heat or cold intolerance; No hair loss  MUSCULOSKELETAL: has intermittent right knee pain, no other joint pain or swelling; No muscle, back, or extremity pain  PSYCHIATRIC: No depression, anxiety, mood swings, or difficulty sleeping  HEME/LYMPH: No easy bruising, or bleeding gums  ALLERY AND IMMUNOLOGIC: No hives or eczema           PHYSICAL EXAM:  T(C): 36.4 (10-24-17 @ 20:00), Max: 37.3 (10-24-17 @ 00:00)  HR: 64 (10-24-17 @ 20:00) (47 - 76)  BP: 105/85 (10-24-17 @ 20:00) (97/81 - 153/117)  RR: 24 (10-24-17 @ 20:00) (16 - 25)  SpO2: 98% (10-24-17 @ 20:00) (86% - 100%)  I&O's Summary    23 Oct 2017 07:01  -  24 Oct 2017 07:00  --------------------------------------------------------  IN: 1025 mL / OUT: 5200 mL / NET: -4175 mL    24 Oct 2017 07:01  -  24 Oct 2017 22:00  --------------------------------------------------------  IN: 0 mL / OUT: 250 mL / NET: -250 mL        GENERAL: Elderly female, NAD, s/p extubation, well-groomed, well-developed  HEAD:  Atraumatic, Normocephalic  EYES:  conjunctiva and sclera clear  ENMT: Moist mucous membranes, No lesions  NECK: Supple, No JVD, Normal thyroid  NERVOUS SYSTEM:  Awake, alert, no focal deficit  CHEST/LUNG: Decreased breath sounds on right side, otherwise clear to auscultation anteriorly; No rales, rhonchi, wheezing, or rubs  HEART: Regular rate and rhythm; No murmurs, rubs, or gallops  ABDOMEN: Soft, Nontender, Nondistended; Bowel sounds present  EXTREMITIES:  2+ Peripheral Pulses, well healed surgical scar right foot, No clubbing, cyanosis,+ trace edema  LYMPH: No lymphadenopathy noted        LABS:                           11.2   6.1   )-----------( 208      ( 24 Oct 2017 06:34 )             34.3     10-24    139  |  102  |  9   ----------------------------<  85  3.4<L>   |  31  |  0.66    Ca    8.7      24 Oct 2017 06:34  Phos  2.3     10-24  Mg     2.0     10-24    TPro  6.3  /  Alb  2.5<L>  /  TBili  0.9  /  DBili  x   /  AST  81<H>  /  ALT  72<H>  /  AlkPhos  131<H>  10-24    ABG - ( 23 Oct 2017 10:34 )  pH: 7.38  /  pCO2: 43    /  pO2: 132   / HCO3: 25    / Base Excess: 0.7   /  SaO2: 98              RADIOLOGY & ADDITIONAL TESTS:    < from: Xray Chest 1 View AP/PA (10.21.17 @ 15:36) >  IMPRESSION:    right perihilar lower lobe patchy airspace consolidation.   Cardiomegaly. Infectious pneumonia should be considered..          < end of copied text >    < from: US Duplex Venous Lower Ext Complete, Bilateral (10.21.17 @ 21:27) >  IMPRESSION:  No evidence of deep vein thrombosis in the visualized veins   of either leg. Poor visualization of bilateral mid to distal femoral and   calf veins.    < end of copied text >    [x ]GOALS OF CARE DISCUSSION WITH PATIENT/FAMILY/PROXY: full code    Imaging Personally Reviewed:  [x ] YES  [ ] NO    Consultant(s) Notes Reviewed:  [x] YES  [ ] NO    Care Discussed with Consultants/Other Providers [x] YES  [ ] NO

## 2017-10-24 NOTE — SWALLOW BEDSIDE ASSESSMENT ADULT - SWALLOW EVAL: DIAGNOSIS
Pt p/w oral dysphagia c/b weak labial seal, impaired bolus formation and mastication, slow A-P transport, oral stasis; however, no overt s/s of aspiration seen during exam.

## 2017-10-24 NOTE — PROGRESS NOTE ADULT - ASSESSMENT
84 yo female from home, walks with walker, lives with daughter and granddaughter, being cared for by family, PMH HTN, 'pre-diabetes,' right knee OA, legally blind 2/2 glaucoma/cataracts brought to ED by family for 1-2 days of weakness, and fevers. Patient presented with sepsis likely secondary to right multifocal pneumonia, legionella a concern given 'normal WBC' with bandemia and hyponatremia, positive urinalysis, and elevated LFTs.  Patient was intubated 10/21/2017 for hypoxia and moved to ICU. Patient is s/p extubation today in ICU

## 2017-10-24 NOTE — PROGRESS NOTE ADULT - SUBJECTIVE AND OBJECTIVE BOX
Patient seen and examined.   Tm 100  no acute distress      MEDICATIONS  (STANDING):  ALBUTerol/ipratropium for Nebulization 3 milliLiter(s) Nebulizer every 6 hours  artificial  tears Solution 1 Drop(s) Both EYES two times a day  heparin  Injectable 5000 Unit(s) SubCutaneous every 8 hours  losartan 25 milliGRAM(s) Oral daily  nystatin Powder 1 Application(s) Topical three times a day  pantoprazole    Tablet 40 milliGRAM(s) Oral before breakfast  piperacillin/tazobactam IVPB. 3.375 Gram(s) IV Intermittent every 8 hours  polyethylene glycol 3350 17 Gram(s) Oral at bedtime  potassium chloride    Tablet ER 40 milliEquivalent(s) Oral every 4 hours  senna 2 Tablet(s) Oral at bedtime      MEDICATIONS  (PRN):  acetaminophen   Tablet. 650 milliGRAM(s) Oral every 6 hours PRN Mild Pain (1 - 3)  acetaminophen  Suppository 650 milliGRAM(s) Rectal every 6 hours PRN For Temp greater than 38 C (100.4 F)       Medications up to date at time of exam.      PHYSICAL EXAMINATION:      Vital Signs Last 24 Hrs  T(C): 36.7 (24 Oct 2017 06:00), Max: 37.8 (23 Oct 2017 15:48)  T(F): 98.1 (24 Oct 2017 06:00), Max: 100 (23 Oct 2017 15:48)  HR: 51 (24 Oct 2017 07:00) (47 - 72)  BP: 153/55 (24 Oct 2017 07:00) (97/81 - 174/72)  BP(mean): 82 (24 Oct 2017 07:00) (71 - 125)  RR: 18 (24 Oct 2017 07:00) (16 - 26)  SpO2: 91% (24 Oct 2017 07:00) (91% - 100%)   (if applicable)    GENERAL: The patient is a well-developed, well-nourished, in no apparent distress.     HEENT: orally intubated.     NECK: Supple, no palpable adenopathy.    LUNGS: BLAE +, B/L rales/crepts R>L.    HEART: S1S2 normal.     ABDOMEN: Soft, nontender, and nondistended.  No hepatosplenomegaly is noted.    EXTREMITIES: Without any cyanosis, clubbing, rash, lesions or edema.    NEUROLOGIC: Awake, alert, responsive.    SKIN:   there is a chronic looking, healing ulcer on the left axillary area.       LABS:                        11.2   6.1   )-----------( 208      ( 24 Oct 2017 06:34 )             34.3     10-24    139  |  102  |  9   ----------------------------<  85  3.4<L>   |  31  |  0.66    Ca    8.7      24 Oct 2017 06:34  Phos  2.3     10-24  Mg     2.0     10-24    TPro  6.3  /  Alb  2.5<L>  /  TBili  0.9  /  DBili  x   /  AST  81<H>  /  ALT  72<H>  /  AlkPhos  131<H>  10-24        ABG - ( 23 Oct 2017 10:34 )  pH: 7.38  /  pCO2: 43    /  pO2: 132   / HCO3: 25    / Base Excess: 0.7   /  SaO2: 98          Culture - Sputum . (10.22.17 @ 16:26)    Gram Stain:   No polymorphonuclear leukocytes per low power field  No Squamous epithelial cells per low power field  No organisms seen per oil power field    Specimen Source: .Sputum Sputum    Culture Results:   No growth to date.          CARDIAC MARKERS ( 22 Oct 2017 13:00 )  1.260 ng/mL / x     / 947 U/L / x     / 8.0 ng/mL        Culture - Blood (10.22.17 @ 00:50)    Specimen Source: .Blood Blood-Peripheral    Culture Results:   No growth to date.    Culture - Blood (10.22.17 @ 00:41)    Specimen Source: .Blood Blood-Peripheral    Culture Results:   No growth to date.    Culture - Urine (10.21.17 @ 23:33)    -  Tobramycin: S 4    -  Trimethoprim/Sulfamethoxazole: R >2/38    -  Ceftriaxone: S <=1    -  Ciprofloxacin: S <=0.5    -  Ertapenem: S <=0.5    -  Gentamicin: R >8    -  Imipenem: S <=1    -  Levofloxacin: S <=1    -  Meropenem: S <=1    -  Nitrofurantoin: S <=32    -  Piperacillin/Tazobactam: S <=8    -  Amikacin: S <=8    -  Ampicillin: R >16    -  Ampicillin/Sulbactam: R >16/8    -  Aztreonam: S <=4    -  Cefazolin: I 16    -  Cefepime: S <=2    -  Cefoxitin: S <=4    -  Ceftazidime: S <=1    Specimen Source: .Urine Catheterized    Culture Results:   10,000 - 49,000 CFU/mL Escherichia coli    Organism Identification: Escherichia coli    Organism: Escherichia coli    Method Type: NIDIA      Serum Pro-Brain Natriuretic Peptide: 972 pg/mL (10-21-17 @ 23:53)            RADIOLOGY & ADDITIONAL STUDIES:  EKG:   CXR:< from: Xray Chest 1 View AP -PORTABLE-Routine (10.22.17 @ 10:17) >  The mediastinal cardiac silhouette is unremarkable. Lines and tubes   unchanged.    Patchy opacities throughout the right lung may be slightly less prominent   when compared to the previous exam. Left lung remains clear.    No acute osseous finding.     < end of copied text >    < from: US Duplex Venous Lower Ext Complete, Bilateral (10.21.17 @ 21:27) >  IMPRESSION:  No evidence of deep vein thrombosis in the visualized veins   of either leg. Poor visualization of bilateral mid to distal femoral and   calf veins.      < end of copied text >      ECHO:      RECOMMENDATIONS: Patient seen and examined.   Tm 100  no acute distress  sitting in chair.   Asymptomatic on ROS.      MEDICATIONS  (STANDING):  ALBUTerol/ipratropium for Nebulization 3 milliLiter(s) Nebulizer every 6 hours  artificial  tears Solution 1 Drop(s) Both EYES two times a day  heparin  Injectable 5000 Unit(s) SubCutaneous every 8 hours  losartan 25 milliGRAM(s) Oral daily  nystatin Powder 1 Application(s) Topical three times a day  pantoprazole    Tablet 40 milliGRAM(s) Oral before breakfast  piperacillin/tazobactam IVPB. 3.375 Gram(s) IV Intermittent every 8 hours  polyethylene glycol 3350 17 Gram(s) Oral at bedtime  potassium chloride    Tablet ER 40 milliEquivalent(s) Oral every 4 hours  senna 2 Tablet(s) Oral at bedtime      MEDICATIONS  (PRN):  acetaminophen   Tablet. 650 milliGRAM(s) Oral every 6 hours PRN Mild Pain (1 - 3)  acetaminophen  Suppository 650 milliGRAM(s) Rectal every 6 hours PRN For Temp greater than 38 C (100.4 F)       Medications up to date at time of exam.      PHYSICAL EXAMINATION:      Vital Signs Last 24 Hrs  T(C): 36.7 (24 Oct 2017 06:00), Max: 37.8 (23 Oct 2017 15:48)  T(F): 98.1 (24 Oct 2017 06:00), Max: 100 (23 Oct 2017 15:48)  HR: 51 (24 Oct 2017 07:00) (47 - 72)  BP: 153/55 (24 Oct 2017 07:00) (97/81 - 174/72)  BP(mean): 82 (24 Oct 2017 07:00) (71 - 125)  RR: 18 (24 Oct 2017 07:00) (16 - 26)  SpO2: 91% (24 Oct 2017 07:00) (91% - 100%)   (if applicable)    GENERAL: The patient is a well-developed, well-nourished, in no apparent distress.     HEENT: orally intubated.     NECK: Supple, no palpable adenopathy.    LUNGS: BLAE +, basal crepts +. .    HEART: S1S2 normal.     ABDOMEN: Soft, nontender, and nondistended.  No hepatosplenomegaly is noted.    EXTREMITIES: Without any cyanosis, clubbing, rash, lesions or edema.    NEUROLOGIC: Awake, alert, responsive.    SKIN:   there is a chronic looking, healing ulcer on the left axillary area.       LABS:                        11.2   6.1   )-----------( 208      ( 24 Oct 2017 06:34 )             34.3     10-24    139  |  102  |  9   ----------------------------<  85  3.4<L>   |  31  |  0.66    Ca    8.7      24 Oct 2017 06:34  Phos  2.3     10-24  Mg     2.0     10-24    TPro  6.3  /  Alb  2.5<L>  /  TBili  0.9  /  DBili  x   /  AST  81<H>  /  ALT  72<H>  /  AlkPhos  131<H>  10-24        ABG - ( 23 Oct 2017 10:34 )  pH: 7.38  /  pCO2: 43    /  pO2: 132   / HCO3: 25    / Base Excess: 0.7   /  SaO2: 98          Culture - Sputum . (10.22.17 @ 16:26)    Gram Stain:   No polymorphonuclear leukocytes per low power field  No Squamous epithelial cells per low power field  No organisms seen per oil power field    Specimen Source: .Sputum Sputum    Culture Results:   No growth to date.          CARDIAC MARKERS ( 22 Oct 2017 13:00 )  1.260 ng/mL / x     / 947 U/L / x     / 8.0 ng/mL        Culture - Blood (10.22.17 @ 00:50)    Specimen Source: .Blood Blood-Peripheral    Culture Results:   No growth to date.    Culture - Blood (10.22.17 @ 00:41)    Specimen Source: .Blood Blood-Peripheral    Culture Results:   No growth to date.    Culture - Urine (10.21.17 @ 23:33)    -  Tobramycin: S 4    -  Trimethoprim/Sulfamethoxazole: R >2/38    -  Ceftriaxone: S <=1    -  Ciprofloxacin: S <=0.5    -  Ertapenem: S <=0.5    -  Gentamicin: R >8    -  Imipenem: S <=1    -  Levofloxacin: S <=1    -  Meropenem: S <=1    -  Nitrofurantoin: S <=32    -  Piperacillin/Tazobactam: S <=8    -  Amikacin: S <=8    -  Ampicillin: R >16    -  Ampicillin/Sulbactam: R >16/8    -  Aztreonam: S <=4    -  Cefazolin: I 16    -  Cefepime: S <=2    -  Cefoxitin: S <=4    -  Ceftazidime: S <=1    Specimen Source: .Urine Catheterized    Culture Results:   10,000 - 49,000 CFU/mL Escherichia coli    Organism Identification: Escherichia coli    Organism: Escherichia coli    Method Type: NIDIA      Serum Pro-Brain Natriuretic Peptide: 972 pg/mL (10-21-17 @ 23:53)            RADIOLOGY & ADDITIONAL STUDIES:  EKG:   CXR:< from: Xray Chest 1 View AP -PORTABLE-Routine (10.22.17 @ 10:17) >  The mediastinal cardiac silhouette is unremarkable. Lines and tubes   unchanged.    Patchy opacities throughout the right lung may be slightly less prominent   when compared to the previous exam. Left lung remains clear.    No acute osseous finding.     < end of copied text >    < from: US Duplex Venous Lower Ext Complete, Bilateral (10.21.17 @ 21:27) >  IMPRESSION:  No evidence of deep vein thrombosis in the visualized veins   of either leg. Poor visualization of bilateral mid to distal femoral and   calf veins.      < end of copied text >      ECHO:      RECOMMENDATIONS:

## 2017-10-24 NOTE — PROGRESS NOTE ADULT - ASSESSMENT
86yo female from home, walks with walker, lives with daughter and granddaughter, being cared for by family, PMH HTN, 'pre-diabetes,' right knee OA, legally blind 2/2 glaucoma/cataracts brought to ED by family 2/2 1-2 days of weakness, subjective fever, chills.    Patient presents with sepsis likely 2/2 right multifocal pneumonia, legionella a concern given 'normal WBC' with bandemia and hyponatremia and elevated LFT.      Problem/Recommendation - 1:  Problem: Acute respiratory failure with hypoxia. Recommendation: 2/2 right multifocal pneumonia, severe, requiring mechanical ventilation  CURB-65 score = 3  -extubated on 10/23  s/p vancomycin 1 gm and zosyn in ed  - c/w zosyn  -negative urine legionella, RVP and sputum cx  f/u strep antigen, procalcitonin  -negative BCx     Problem/Recommendation - 2:  ·  Problem: Sepsis, due to unspecified organism.  Recommendation: 2/3 qsofa criteria met (change in mental status, tachypnea) 2/2 right multifocal pneumonia and UTI  now resolved  lactate normalized after 3700cc fluid  -of fluids  - broad spectrum with zosyn and levaquin  - long  -UCx <50,000 e.coli  -blood culture neg       Problem/Recommendation - 3:  ·  Problem: Urinary tract infection without hematuria, site unspecified.  Recommendation: likely contributing to sepsis  - on zosyn  -<50,000 ecoli UCx (pan sensitive)     Problem/Recommendation - 4:  ·  Problem: Gastrointestinal hemorrhage, unspecified gastrointestinal hemorrhage type.  Recommendation: bright red blood via NGT 2/2 ? traumatic intubation, possibly UGIB  - protonix gtt  - cbc q6  - GI consult Dr. Cedillo- signed off, patient is stable     Problem/Recommendation - 5:  ·  Problem: Transaminitis.  Recommendation: possibly 2/2 hypovolemia  not obstructive picture  possibly 2/2 sepsis, hepatic congestion 2/2 CHF less likely  -trending down     Problem/Recommendation - 6:  Problem: Hyponatremia. Recommendation: possibly 2/2 legionella pneumonia  hypovolemic hyponatremia  -resolved     Problem/Recommendation - 7:  Problem: History of hypertension. Recommendation:   resumed losartan, monitoring BP     Problem/Recommendation - 8:  Problem: Need for prophylactic measure. Recommendation: bleeding from NGT   improve score =3, will hold off on chemical DVT ppx 2/2 possible GIB  compression boots and protonix gtt. 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    Patient presents with sepsis likely 2/2 right multifocal pneumonia, legionella a concern given 'normal WBC' with bandemia and hyponatremia and elevated LFT.      Problem/Recommendation - 1:  Problem: Acute respiratory failure with hypoxia. Recommendation: 2/2 right multifocal pneumonia, severe, requiring mechanical ventilation  CURB-65 score = 3  -extubated on 10/23  s/p vancomycin 1 gm and zosyn in ed  - c/w zosyn  -negative urine legionella, RVP and sputum cx  f/u strep antigen, procalcitonin  -negative BCx     Problem/Recommendation - 2:  ·  Problem: Sepsis, due to unspecified organism.  Recommendation: 2/3 qsofa criteria met (change in mental status, tachypnea) 2/2 right multifocal pneumonia and UTI  now resolved  lactate normalized after 3700cc fluid  -of fluids  - broad spectrum with zosyn and levaquin  - long  -UCx <50,000 e.coli  -blood culture neg       Problem/Recommendation - 3:  ·  Problem: Urinary tract infection without hematuria, site unspecified.  Recommendation: likely contributing to sepsis  - on zosyn  -<50,000 ecoli UCx (pan sensitive)     Problem/Recommendation - 4:  ·  Problem: Gastrointestinal hemorrhage, unspecified gastrointestinal hemorrhage type.  Recommendation: bright red blood via NGT 2/2 ? traumatic intubation, possibly UGIB  - protonix po  - GI consult Dr. Cedillo- signed off, patient is stable     Problem/Recommendation - 5:  ·  Problem: Transaminitis.  Recommendation: possibly 2/2 hypovolemia  not obstructive picture  possibly 2/2 sepsis, hepatic congestion 2/2 CHF less likely  -trending down     Problem/Recommendation - 6:  Problem: Hyponatremia. Recommendation: possibly 2/2 legionella pneumonia  hypovolemic hyponatremia  -resolved     Problem/Recommendation - 7:  Problem: History of hypertension. Recommendation:   resumed losartan, monitoring BP     Problem/Recommendation - 8:  Problem: Need for prophylactic measure. Recommendation: bleeding from NGT   improve score =3, will hold off on chemical DVT ppx 2/2 possible GIB  compression boots and protonix gtt. 85yo female from home, walks with walker, lives with daughter and granddaughter, being cared for by family, PMH HTN, 'pre-diabetes,' right knee OA, legally blind 2/2 glaucoma/cataracts brought to ED by family 2/2 1-2 days of weakness, subjective fever, chills.    Patient presents with sepsis likely 2/2 right multifocal pneumonia, legionella a concern given 'normal WBC' with bandemia and hyponatremia and elevated LFT.      Problem/Recommendation - 1:  Problem: Acute respiratory failure with hypoxia. Recommendation: 2/2 right multifocal pneumonia, severe, requiring mechanical ventilation  CURB-65 score = 3  -extubated on 10/23  s/p vancomycin 1 gm and zosyn in ed  - c/w zosyn  -negative urine legionella, RVP and sputum cx  f/u strep antigen, procalcitonin  -negative BCx     Problem/Recommendation - 2:  ·  Problem: Sepsis, due to unspecified organism.  Recommendation: 2/3 qsofa criteria met (change in mental status, tachypnea) 2/2 right multifocal pneumonia and UTI  now resolved  lactate normalized after 3700cc fluid  -of fluids  - broad spectrum with zosyn and levaquin  - long  -UCx <50,000 e.coli  -blood culture neg       Problem/Recommendation - 3:  ·  Problem: Urinary tract infection without hematuria, site unspecified.  Recommendation: likely contributing to sepsis  - on zosyn  -<50,000 ecoli UCx (pan sensitive)     Problem/Recommendation - 4:  ·  Problem: Gastrointestinal hemorrhage, unspecified gastrointestinal hemorrhage type.  Recommendation: bright red blood via NGT 2/2 ? traumatic intubation, possibly UGIB  - protonix po  - GI consult Dr. Cedillo- signed off, patient is stable     Problem/Recommendation - 5:  ·  Problem: Transaminitis.  Recommendation: possibly 2/2 hypovolemia  not obstructive picture  possibly 2/2 sepsis, hepatic congestion 2/2 CHF less likely  -trending down     Problem/Recommendation - 6:  Problem: Hyponatremia. Recommendation: possibly 2/2 legionella pneumonia  hypovolemic hyponatremia  -resolved     Problem/Recommendation - 7:  Problem: History of hypertension. Recommendation:   resumed losartan, monitoring BP     Problem/Recommendation - 8:  Problem: Need for prophylactic measure. Recommendation: bleeding from NGT   improve score =3, will hold off on chemical DVT ppx 2/2 possible GIB  compression boots and protonix gtt.

## 2017-10-24 NOTE — PROGRESS NOTE ADULT - PROBLEM SELECTOR PLAN 1
s/p extubation today in ICU, was s/p intubation last night 10/21/2017,  per ICU team protocol s/p extubation 10/23/2014 in ICU, was s/p intubation on  10/21/2017,  per ICU team protocol, is doing well, plan to downgrade to medical floor tomorrow

## 2017-10-24 NOTE — PHYSICAL THERAPY INITIAL EVALUATION ADULT - IMPAIRMENTS FOUND, PT EVAL
arousal, attention, and cognition/aerobic capacity/endurance/gait, locomotion, and balance/muscle strength

## 2017-10-24 NOTE — SWALLOW BEDSIDE ASSESSMENT ADULT - MODE OF PRESENTATION
fed by clinician/4 fl oz/cup spoon/fed by clinician/x4 fed by clinician/spoon/x4 cup/4 fl oz/fed by clinician

## 2017-10-24 NOTE — SWALLOW BEDSIDE ASSESSMENT ADULT - ASR SWALLOW ASPIRATION MONITOR
throat clearing/position upright (90Y)/gurgly voice/upper respiratory infection/change of breathing pattern/cough/oral hygiene/fever/pneumonia

## 2017-10-24 NOTE — CHART NOTE - NSCHARTNOTEFT_GEN_A_CORE
Patient is a 86yo F with pmhx of legal blindness, HTN, and ?prediabetes came in with c/o fever since the past 2-3 days prior to admission. She said she also felt weak so her family bought her in. She was also SOB. Denied chest pain, abdominal pain, or dysuria. Denied nausea or vomiting. She c/o chronic b/l knee pain due to arthritis. Also stated having pain in small ulcer below her left arm along with itching since the past couple of days. Denied cough, but does have phlegm production. No other complaints at this time. Patient is febrile upto 102 in the ED with lactate of 2.1. No leukocytosis or left shift. UA: dirty. CXR- PNA. Patient is mostly bedbound. Ambulates rarely with walker due to chronic knee pain. Lives with family at home. Family also serves as HHA. Admitted for sepsis due to PNA and UTI.     Admitted to ICU for:  1) Acute respiratory failure with hypoxia 2/2 right multifocal pneumonia, severe, requiring mechanical ventilation  CURB-65 score = 3 on admission  -extubated on 10/23  -s/p vancomycin 1 gm and zosyn in ed  -c/w levaquin  -Dr. Olsen- ID  -negative urine legionella, RVP and sputum cx  - f/u strep antigen, procalcitonin  -negative BCx     2) Sepsis  2/3 qsofa criteria met (change in mental status, tachypnea) 2/2 right multifocal pneumonia and UTI  now resolved  lactate normalized after 3700cc fluid  -of fluids  - broad spectrum with zosyn and levaquin, now only on levaquin  - long discontinued on 10/24  -UCx <50,000 e.coli, pansensitive  -blood culture neg    3) Urinary tract infection without hematuria, site unspecified.  Recommendation: likely contributing to sepsis  - on zosyn  -<50,000 ecoli UCx (pan sensitive)     4) Gastrointestinal hemorrhage, unspecified gastrointestinal hemorrhage type.    -bright red blood via NGT 2/2 ? traumatic intubation, possibly UGIB  - protonix gtt switched to po ppi  - GI consult Dr. Cedillo- signed off, patient is stable, H&H stable    5) Transaminitis  possibly 2/2 hypovolemia and sepsis  not obstructive picture  -trending down, continue to trend    6) Hyponatremia  possibly 2/2 legionella pneumonia on admission, urine legionella negative  hypovolemic hyponatremia  -resolved    7) History of hypertension  resumed losartan, monitoring BP  will start hydrochlorothiazide  can continue hyzaar on discharge    8) Need for prophylactic measure  improve score =3, originally held off on DVT ppx due to suspected GI bleed, now on hep sq  protonix for GI ppx    Medically stable for downgrade to medical floor. Follow up with ID for duration of Abx- Dr. Olsen. Patient is a 85yo F with pmhx of legal blindness, HTN, and ?prediabetes came in with c/o fever since the past 2-3 days prior to admission. She said she also felt weak so her family bought her in. She was also SOB. Denied chest pain, abdominal pain, or dysuria. Denied nausea or vomiting. She c/o chronic b/l knee pain due to arthritis. Also stated having pain in small ulcer below her left arm along with itching since the past couple of days. Denied cough, but does have phlegm production. No other complaints at this time. Patient is febrile upto 102 in the ED with lactate of 2.1. No leukocytosis or left shift. UA: dirty. CXR- PNA. Patient is mostly bedbound. Ambulates rarely with walker due to chronic knee pain. Lives with family at home. Family also serves as HHA. Admitted for sepsis due to PNA and UTI.     Admitted to ICU for:  1) Acute respiratory failure with hypoxia 2/2 right multifocal pneumonia, severe, requiring mechanical ventilation  CURB-65 score = 3 on admission  -extubated on 10/23  -s/p vancomycin 1 gm and zosyn in ed  -c/w levaquin  -Dr. Olsen- ID  -negative urine legionella, RVP and sputum cx  - f/u strep antigen, procalcitonin  -negative BCx     2) Sepsis  2/3 qsofa criteria met (change in mental status, tachypnea) 2/2 right multifocal pneumonia and UTI  now resolved  lactate normalized after 3700cc fluid  -of fluids  - broad spectrum with zosyn and levaquin, now only on levaquin  - long discontinued on 10/24  -UCx <50,000 e.coli, pansensitive  -blood culture neg    3) Urinary tract infection without hematuria, site unspecified.  Recommendation: likely contributing to sepsis  - on zosyn  -<50,000 ecoli UCx (pan sensitive)     4) Gastrointestinal hemorrhage, unspecified gastrointestinal hemorrhage type.    -bright red blood via NGT 2/2 ? traumatic intubation, possibly UGIB  - protonix gtt switched to po ppi  - GI consult Dr. Cedillo- signed off, patient is stable, H&H stable    5) Transaminitis  possibly 2/2 hypovolemia and sepsis  not obstructive picture  -trending down, continue to trend    6) Hyponatremia  possibly 2/2 legionella pneumonia on admission, urine legionella negative  hypovolemic hyponatremia  -resolved    7) History of hypertension  resumed losartan, monitoring BP  will start hydrochlorothiazide  can continue hyzaar on discharge    8) Need for prophylactic measure  improve score =3, originally held off on DVT ppx due to suspected GI bleed, now on hep sq  protonix for GI ppx    Medically stable for downgrade to medical floor. Follow up with ID for duration of Abx- Dr. Olsen.

## 2017-10-24 NOTE — PHYSICAL THERAPY INITIAL EVALUATION ADULT - ADDITIONAL COMMENTS
needs set-up, supervision, and verbal cues with ADLs, transfers, and ambulation using a rolling walker

## 2017-10-24 NOTE — PROGRESS NOTE ADULT - SUBJECTIVE AND OBJECTIVE BOX
Patient denies CP, SOB  	  MEDICATIONS:  MEDICATIONS  (STANDING):  ALBUTerol/ipratropium for Nebulization 3 milliLiter(s) Nebulizer every 6 hours  artificial  tears Solution 1 Drop(s) Both EYES two times a day  heparin  Injectable 5000 Unit(s) SubCutaneous every 8 hours  hydrochlorothiazide 12.5 milliGRAM(s) Oral daily  levoFLOXacin IVPB      losartan 25 milliGRAM(s) Oral daily  nystatin Powder 1 Application(s) Topical three times a day  pantoprazole    Tablet 40 milliGRAM(s) Oral before breakfast  polyethylene glycol 3350 17 Gram(s) Oral at bedtime  senna 2 Tablet(s) Oral at bedtime      LABS:	 	    CARDIAC MARKERS:  CARDIAC MARKERS ( 22 Oct 2017 13:00 )  1.260 ng/mL / x     / 947 U/L / x     / 8.0 ng/mL  CARDIAC MARKERS ( 22 Oct 2017 03:48 )  1.470 ng/mL / x     / 1094 U/L / x     / 11.0 ng/mL  CARDIAC MARKERS ( 21 Oct 2017 23:53 )  0.580 ng/mL / x     / 1146 U/L / x     / 13.4 ng/mL                                11.2   6.1   )-----------( 208      ( 24 Oct 2017 06:34 )             34.3     Hemoglobin: 11.2 g/dL (10-24 @ 06:34)  Hemoglobin: 10.8 g/dL (10-23 @ 06:35)  Hemoglobin: 11.6 g/dL (10-22 @ 03:48)  Hemoglobin: 13.0 g/dL (10-22 @ 00:29)  Hemoglobin: 13.3 g/dL (10-21 @ 14:55)      10-24    139  |  102  |  9   ----------------------------<  85  3.4<L>   |  31  |  0.66    Ca    8.7      24 Oct 2017 06:34  Phos  2.3     10-24  Mg     2.0     10-24    TPro  6.3  /  Alb  2.5<L>  /  TBili  0.9  /  DBili  x   /  AST  81<H>  /  ALT  72<H>  /  AlkPhos  131<H>  10-24    Creatinine Trend: 0.66<--, 0.60<--, 0.81<--, 0.87<--, 0.97<--    COAGS:       proBNP:   Lipid Profile:   HgA1c: Hemoglobin A1C, Whole Blood: 6.5 % (10-24 @ 10:04)    TSH:       PHYSICAL EXAM:  T(C): 36.7 (10-24-17 @ 06:00), Max: 37.8 (10-23-17 @ 15:48)  HR: 74 (10-24-17 @ 13:00) (47 - 76)  BP: 145/64 (10-24-17 @ 13:00) (97/81 - 174/72)  RR: 23 (10-24-17 @ 13:00) (16 - 26)  SpO2: 100% (10-24-17 @ 13:00) (90% - 100%)  Wt(kg): --  I&O's Summary    23 Oct 2017 07:01  -  24 Oct 2017 07:00  --------------------------------------------------------  IN: 1025 mL / OUT: 5125 mL / NET: -4100 mL          HEENT:   Normal oral mucosa, PERRL, EOMI	  Lymphatic: No obvious lymphadenopathy , no edema  Cardiovascular: Normal S1 S2, No JVD, 1/6 TAI murmur, Peripheral pulses palpable 2+ bilaterally  Respiratory: Lungs clear to auscultation, normal effort 	  Gastrointestinal:  Soft, Non-tender, + BS	  Skin: No rashes,  No cyanosis, warm to touch  Musculoskeletal: Normal range of motion, normal strength  Psychiatry:  Appropriate Mood & affect     TELEMETRY: 	  Sinus    ASSESSMENT/PLAN: 	86y Female mostly bedbound HTN, and ?prediabetes came in with c/o fever since the past 2-3 days. found with sepsis due to UTI and PNA (+) Trop. Normal LV and RV function on echo.    - Asa and statin  - suspect demand ischemia  - Abx per MICU  - Remainder of cardiac w/u will depend on clinical course    Tulio Mensah MD, FACC  [facility], [facility]  [address] [place] [address]  PHONE:  [phone]  BEEPER : [phone]

## 2017-10-24 NOTE — PROGRESS NOTE ADULT - PROBLEM SELECTOR PLAN 2
right sided, most likely contributed to fever on presentation  CURB-65 score = 3  concern for legionella given hyponatremia and relative leukopenia with bandemia  currently on IV antibiotics- zosyn; per ID consult and ICU team  -negative urine legionella,   -f/u strep antigen,  procalcitonin  -negative BCx, sputum culture, RVP thus far    - follow up urine legionella, strep antigen, sputum culture, procalcitonin, RVP  - follow up Blood Cultures  - ID consulted by ICU team right sided, most likely contributed to fever on presentation  CURB-65 score = 3  concern for legionella given hyponatremia and relative leukopenia with bandemia  currently on antibiotics-  per ID consult and ICU team  -negative urine legionella,  strep antigen  -negative BCx, sputum culture, RVP thus far    - follow up urine legionella, strep antigen, sputum culture, procalcitonin, RVP  - follow up Blood Cultures  - ID consulted by ICU team

## 2017-10-24 NOTE — SWALLOW BEDSIDE ASSESSMENT ADULT - SWALLOW EVAL: RECOMMENDED FEEDING/EATING TECHNIQUES
maintain upright posture during/after eating for 30 mins/oral hygiene/allow for swallow between intakes/position upright (90 degrees)/small sips/bites/alternate food with liquid

## 2017-10-24 NOTE — PROGRESS NOTE ADULT - ASSESSMENT
84yo F with pmhx of legal blindness, HTN, and ?prediabetes came in with c/o fever since the past 2-3 days and looking increasingly weak on day of admission. Pt was initially given ceftriaxone and azithro for covering CAP and UTI as CXR showed right sided infiltrate and u/a was positive.   pt was code sepsis in ER. she had WBC 4k with 13% bands, elevated transaminases and mildly elevated lactate. Patient had RR on floor for respiratory distress and AMS> she had hypoxia to 75% and was intubated by anesthesia and now in ICU.   ABX was broadened to zosyn and levofloxacin    Impression:   1. right sided pneumonia, community acquired. CXR from yesterday improved. Blood culture and urine legionella ag negative. sputum culture no growth.   2. acute hypoxic respiratory failure sec to pneumonia  3. positive urinalysis: asymptomatic, u/a collection(from bedpan) unlikley clean catch. growing e coli, pan    Plan:  ·	continue present abx, narrow as per culture results.   ·	ICU care.   ·	f/u final urine and sputum cultures.      ·	follow up blood cultures and urine cultures  ·	follow up urine legionella ag and send pneumococcal ag  ·	if any ET tube secretions send it for gram stain and culture.   ·	continue Zosyn and levofloxacin for now  ·	ICU care.  note not complete yet! 84yo F with pmhx of legal blindness, HTN, and ?prediabetes came in with c/o fever since the past 2-3 days and looking increasingly weak on day of admission. Pt was initially given ceftriaxone and azithro for covering CAP and UTI as CXR showed right sided infiltrate and u/a was positive.   pt was code sepsis in ER. she had WBC 4k with 13% bands, elevated transaminases and mildly elevated lactate. Patient had RR on floor for respiratory distress and AMS> she had hypoxia to 75% and was intubated by anesthesia and now in ICU.   ABX was broadened to zosyn and levofloxacin    Impression:   1. right sided pneumonia, community acquired. CXR from yesterday improved. Blood culture and urine legionella ag negative. sputum culture no growth.   2. acute hypoxic respiratory failure sec to pneumonia  3. positive urinalysis: asymptomatic, u/a collection(from bedpan) unlikley clean catch. growing e coli, quinolone sensitive.     Plan:  ·	Switch to levofloxacin to complete 7 days of rx.      ·	follow up blood cultures and urine cultures  ·	follow up urine legionella ag and send pneumococcal ag  ·	if any ET tube secretions send it for gram stain and culture.   ·	continue Zosyn and levofloxacin for now  ·	ICU care.  Case discussed in detail with the primary team.  Thanks for this consultation. please call me if any questions at [phone]. 84yo F with pmhx of legal blindness, HTN, and ?prediabetes came in with c/o fever since the past 2-3 days and looking increasingly weak on day of admission. Pt was initially given ceftriaxone and azithro for covering CAP and UTI as CXR showed right sided infiltrate and u/a was positive.   pt was code sepsis in ER. she had WBC 4k with 13% bands, elevated transaminases and mildly elevated lactate. Patient had RR on floor for respiratory distress and AMS> she had hypoxia to 75% and was intubated by anesthesia and now in ICU.   ABX was broadened to zosyn and levofloxacin    Impression:   1. right sided pneumonia, community acquired. CXR from yesterday improved. Blood culture and urine legionella ag negative. sputum culture no growth.   2. acute hypoxic respiratory failure sec to pneumonia  3. positive urinalysis: asymptomatic, u/a collection(from bedpan) unlikley clean catch. growing e coli, quinolone sensitive.     Plan:  ·	Advise to switch to levofloxacin to complete 7 days of rx.      ·	follow up blood cultures and urine cultures  ·	follow up urine legionella ag and send pneumococcal ag  ·	if any ET tube secretions send it for gram stain and culture.   ·	continue Zosyn and levofloxacin for now  ·	ICU care.  Case discussed in detail with the primary team.  Thanks for this consultation. please call me if any questions at [phone]. 87yo F with pmhx of legal blindness, HTN, and ?prediabetes came in with c/o fever since the past 2-3 days and looking increasingly weak on day of admission. Pt was initially given ceftriaxone and azithro for covering CAP and UTI as CXR showed right sided infiltrate and u/a was positive.   pt was code sepsis in ER. she had WBC 4k with 13% bands, elevated transaminases and mildly elevated lactate. Patient had RR on floor for respiratory distress and AMS> she had hypoxia to 75% and was intubated by anesthesia and now in ICU.   ABX was broadened to zosyn and levofloxacin    Impression:   1. right sided pneumonia, community acquired. CXR from yesterday improved. Blood culture and urine legionella ag negative. sputum culture no growth.   2. acute hypoxic respiratory failure sec to pneumonia  3. positive urinalysis: asymptomatic, u/a collection(from bedpan) unlikley clean catch. growing e coli, quinolone sensitive.     Plan:  ·	Advise to switch to levofloxacin to complete 7 days of rx.      ·	follow up blood cultures and urine cultures  ·	follow up urine legionella ag and send pneumococcal ag  ·	if any ET tube secretions send it for gram stain and culture.   ·	continue Zosyn and levofloxacin for now  ·	ICU care.  Case discussed in detail with the primary team.  Thanks for this consultation. please call me if any questions at [phone].

## 2017-10-24 NOTE — PROGRESS NOTE ADULT - ATTENDING COMMENTS
85 year old female who p/w aspiration PNA and required intubation. hospital course complicated with elevated trop and possible GIB, with no active bleeding noted at this time. pt extubated 10/23    pt doing wel, no cp, sob, palp, nv  oob    assessment  acute resp failure s/p extubation 10/23 for aspiration pna      plan  change abx to zosyn  cardio fo/ for  elev trop  gi f/u - monitor cbc . protonix oral  OOB, PT  transfer to medical floor. 86 year old female who p/w aspiration PNA and required intubation. hospital course complicated with elevated trop and possible GIB, with no active bleeding noted at this time. pt extubated 10/23    pt doing wel, no cp, sob, palp, nv  oob    assessment  acute resp failure s/p extubation 10/23 for aspiration pna      plan  change abx to zosyn  cardio fo/ for  elev trop  gi f/u - monitor cbc . protonix oral  OOB, PT  transfer to medical floor.

## 2017-10-24 NOTE — PHYSICAL THERAPY INITIAL EVALUATION ADULT - PATIENT/FAMILY/SIGNIFICANT OTHER GOALS STATEMENT, PT EVAL
return home; participate in transfers and ambulation with limited assistance while using a rolling walker

## 2017-10-24 NOTE — PROGRESS NOTE ADULT - PROBLEM SELECTOR PLAN 3
- currently on broad spectrum antibiotics with IV zosyn   - Is s/p long  - UCx = -<50,000 ecoli   - ID following - currently on  antibiotics per ID - levoFLOXacin IV  - Is s/p long  - UCx = -<50,000 ecoli   - ID following

## 2017-10-24 NOTE — PROGRESS NOTE ADULT - PROBLEM SELECTOR PLAN 6
Venodynes for now, LE dopplers negative for DVT LE dopplers negative for DVT, is on heparin sq as per ICU

## 2017-10-24 NOTE — SWALLOW BEDSIDE ASSESSMENT ADULT - SLP GENERAL OBSERVATIONS
Pt accepted all PO trials, required verbal cuing to intake bolus Pt accepted all PO trials, required verbal cuing to intake bolus; denied pain or discomfort during PO trials.

## 2017-10-24 NOTE — SWALLOW BEDSIDE ASSESSMENT ADULT - COMMENTS
Pt received seated upright on chair at 90 degrees, AA+Ox3, cooperative, visually impaired (legally blind), required verbal cues to follow directives and responded to queries regarding historical information. Pt's daughter present and provided historical information. Pt received seated upright on chair at 90 degrees, AA+Ox3, cooperative, visually impaired (legally blind), required verbal cues to follow directives and responded to queries regarding historical information. Pt's daughter present during exam.

## 2017-10-24 NOTE — SWALLOW BEDSIDE ASSESSMENT ADULT - ASR SWALLOW DENTITION
edentulous, does not have dentures/has dentures but prefers not to use them endentulous during exam, as per Pt's ford, Pt has dentures but prefers not to use them

## 2017-10-24 NOTE — PROGRESS NOTE ADULT - SUBJECTIVE AND OBJECTIVE BOX
INTERVAL HPI/OVERNIGHT EVENTS: no events    PRESSORS: [ ] YES [x ] NO  WHICH:    ANTIBIOTICS: zosyn                  DATE STARTED: 10/21    Antimicrobial:  piperacillin/tazobactam IVPB. 3.375 Gram(s) IV Intermittent every 8 hours    Cardiovascular:  losartan 25 milliGRAM(s) Oral daily    Pulmonary:  ALBUTerol/ipratropium for Nebulization 3 milliLiter(s) Nebulizer every 6 hours    Hematalogic:  heparin  Injectable 5000 Unit(s) SubCutaneous every 8 hours    Other:  acetaminophen   Tablet. 650 milliGRAM(s) Oral every 6 hours PRN  acetaminophen  Suppository 650 milliGRAM(s) Rectal every 6 hours PRN  artificial  tears Solution 1 Drop(s) Both EYES two times a day  nystatin Powder 1 Application(s) Topical three times a day  pantoprazole    Tablet 40 milliGRAM(s) Oral before breakfast  polyethylene glycol 3350 17 Gram(s) Oral at bedtime  potassium chloride    Tablet ER 40 milliEquivalent(s) Oral every 4 hours  senna 2 Tablet(s) Oral at bedtime    acetaminophen   Tablet. 650 milliGRAM(s) Oral every 6 hours PRN  acetaminophen  Suppository 650 milliGRAM(s) Rectal every 6 hours PRN  ALBUTerol/ipratropium for Nebulization 3 milliLiter(s) Nebulizer every 6 hours  artificial  tears Solution 1 Drop(s) Both EYES two times a day  heparin  Injectable 5000 Unit(s) SubCutaneous every 8 hours  losartan 25 milliGRAM(s) Oral daily  nystatin Powder 1 Application(s) Topical three times a day  pantoprazole    Tablet 40 milliGRAM(s) Oral before breakfast  piperacillin/tazobactam IVPB. 3.375 Gram(s) IV Intermittent every 8 hours  polyethylene glycol 3350 17 Gram(s) Oral at bedtime  potassium chloride    Tablet ER 40 milliEquivalent(s) Oral every 4 hours  senna 2 Tablet(s) Oral at bedtime    Drug Dosing Weight  Height (cm): 175.26 (21 Oct 2017 14:24)  Weight (kg): 124.7 (21 Oct 2017 14:24)  BMI (kg/m2): 40.6 (21 Oct 2017 14:24)  BSA (m2): 2.36 (21 Oct 2017 14:24)    CENTRAL LINE: [ ] YES [ x] NO  LOCATION:   DATE INSERTED:  REMOVE: [ ] YES [ ] NO  EXPLAIN:    LONG: [x] YES [ ] NO    DATE INSERTED: 10/21  REMOVE:  [ ] YES [ ] NO  EXPLAIN:    A-LINE:  [ ] YES [x ] NO  LOCATION:   DATE INSERTED:  REMOVE:  [ ] YES [ ] NO  EXPLAIN:    PMH -reviewed admission note, no change since admission    ICU Vital Signs Last 24 Hrs  T(C): 36.7 (24 Oct 2017 06:00), Max: 37.8 (23 Oct 2017 15:48)  T(F): 98.1 (24 Oct 2017 06:00), Max: 100 (23 Oct 2017 15:48)  HR: 51 (24 Oct 2017 07:00) (47 - 72)  BP: 153/55 (24 Oct 2017 07:00) (97/81 - 174/72)  BP(mean): 82 (24 Oct 2017 07:00) (71 - 125)  ABP: --  ABP(mean): --  RR: 18 (24 Oct 2017 07:00) (16 - 26)  SpO2: 91% (24 Oct 2017 07:00) (91% - 100%)    ABG - ( 23 Oct 2017 10:34 )  pH: 7.38  /  pCO2: 43    /  pO2: 132   / HCO3: 25    / Base Excess: 0.7   /  SaO2: 98        10-23 @ 07:01  -  10-24 @ 07:00  --------------------------------------------------------  IN: 1025 mL / OUT: 5125 mL / NET: -4100 mL    PHYSICAL EXAM:    GENERAL:   HEAD: atraumatic, normocephalic   EYES: PERRLA, white sclera.   ENMT: nasal mucosa-moist, Oral cavity- no exudate  NECK: supple, JVD/ no JVD  SKIN: warm, dry   CHEST/LUNG:  No Chest deformity , no chest tenderness. bilateral breath sounds,no  adventitious sounds  HEART: RRR, no m/r/g   ABDOMEN: soft, nontender, nondistended; bowel sounds.  : long catheter.  EXTREMITIES: no peripheral edema, no cyanosis, no clubbing.  NEURO: AA0X3 , no focal neuro deficits    LABS:  CBC Full  -  ( 24 Oct 2017 06:34 )  WBC Count : 6.1 K/uL  Hemoglobin : 11.2 g/dL  Hematocrit : 34.3 %  Platelet Count - Automated : 208 K/uL  Mean Cell Volume : 95.6 fl  Mean Cell Hemoglobin : 31.1 pg  Mean Cell Hemoglobin Concentration : 32.6 gm/dL  Auto Neutrophil # : 2.9 K/uL  Auto Lymphocyte # : 1.8 K/uL  Auto Monocyte # : 0.5 K/uL  Auto Eosinophil # : 0.8 K/uL  Auto Basophil # : 0.1 K/uL  Auto Neutrophil % : 47.4 %  Auto Lymphocyte % : 29.7 %  Auto Monocyte % : 8.5 %  Auto Eosinophil % : 12.9 %  Auto Basophil % : 1.6 %    10-24    139  |  102  |  9   ----------------------------<  85  3.4<L>   |  31  |  0.66    Ca    8.7      24 Oct 2017 06:34  Phos  2.3     10-24  Mg     2.0     10-24    TPro  6.3  /  Alb  2.5<L>  /  TBili  0.9  /  DBili  x   /  AST  81<H>  /  ALT  72<H>  /  AlkPhos  131<H>  10-24    Culture Results:   No growth to date. (10-22 @ 16:26)  Culture Results:   No growth to date. (10-22 @ 00:50)  Culture Results:   No growth to date. (10-22 @ 00:41)  Culture Results:   10,000 - 49,000 CFU/mL Escherichia coli (10-21 @ 23:33)    RADIOLOGY & ADDITIONAL STUDIES REVIEWED:      CXR repeat pending    [ ]GOALS OF CARE DISCUSSION WITH PATIENT/FAMILY/PROXY: full code    CRITICAL CARE TIME SPENT: 35 minutes

## 2017-10-24 NOTE — SWALLOW BEDSIDE ASSESSMENT ADULT - SLP PERTINENT HISTORY OF CURRENT PROBLEM
84yo Female with pmhx of legal blindness, HTN, and ?prediabetes came in with c/o fever since the past 2-3 days prior to admission. She said she also felt weak so her family bought her in. She was also SOB. Denied chest pain, abdominal pain, or dysuria. Denied nausea or vomiting. She c/o chronic b/l knee pain due to arthritis. Also stated having pain in small ulcer below her left arm along with itching since the past couple of days. Denied cough, but does have phlegm production. No other complaints at this time. Patient is febrile upto 102 in the ED with lactate of 2.1. No leukocytosis or left shift. UA: dirty. CXR- PNA. Patient is mostly bedbound. Ambulates rarely with walker due to chronic knee pain. Lives with family at home. Family also serves as HHA. Admitted for sepsis due to PNA and UTI. Intubated 10/22, extubated 10/23, currently on Areosol Mask FiO2: 40. 86yo Female with pmhx of legal blindness, HTN; admitted for c/o fever for 2-3 days prior to admission. Pt reportedly felt weak & was BIBfamily, p/w c/o SOB. Denied cough, but does have phlegm production. Pt is mostly bedbound. Ambulates rarely with walker due to chronic knee pain. Lives with family at home. Family also serves as HHA. Admitted for sepsis due to PNA and UTI. Chest XR (+) Patchy right lung opacities; Mild diffuse interstitial prominence. Pt was Intubated 10/22 & extubated 10/23, currently on Areosol Mask FiO2: 40.

## 2017-10-25 LAB
ALBUMIN SERPL ELPH-MCNC: 2.6 G/DL — LOW (ref 3.5–5)
ALP SERPL-CCNC: 130 U/L — HIGH (ref 40–120)
ALT FLD-CCNC: 72 U/L DA — HIGH (ref 10–60)
ANION GAP SERPL CALC-SCNC: 6 MMOL/L — SIGNIFICANT CHANGE UP (ref 5–17)
AST SERPL-CCNC: 71 U/L — HIGH (ref 10–40)
BASOPHILS # BLD AUTO: 0.1 K/UL — SIGNIFICANT CHANGE UP (ref 0–0.2)
BASOPHILS NFR BLD AUTO: 1.4 % — SIGNIFICANT CHANGE UP (ref 0–2)
BILIRUB SERPL-MCNC: 0.8 MG/DL — SIGNIFICANT CHANGE UP (ref 0.2–1.2)
BUN SERPL-MCNC: 10 MG/DL — SIGNIFICANT CHANGE UP (ref 7–18)
CALCIUM SERPL-MCNC: 8.6 MG/DL — SIGNIFICANT CHANGE UP (ref 8.4–10.5)
CHLORIDE SERPL-SCNC: 103 MMOL/L — SIGNIFICANT CHANGE UP (ref 96–108)
CO2 SERPL-SCNC: 30 MMOL/L — SIGNIFICANT CHANGE UP (ref 22–31)
CREAT SERPL-MCNC: 0.56 MG/DL — SIGNIFICANT CHANGE UP (ref 0.5–1.3)
EOSINOPHIL # BLD AUTO: 0.6 K/UL — HIGH (ref 0–0.5)
EOSINOPHIL NFR BLD AUTO: 11 % — HIGH (ref 0–6)
GLUCOSE SERPL-MCNC: 118 MG/DL — HIGH (ref 70–99)
HCT VFR BLD CALC: 34.5 % — SIGNIFICANT CHANGE UP (ref 34.5–45)
HGB BLD-MCNC: 10.9 G/DL — LOW (ref 11.5–15.5)
LYMPHOCYTES # BLD AUTO: 1.9 K/UL — SIGNIFICANT CHANGE UP (ref 1–3.3)
LYMPHOCYTES # BLD AUTO: 33.9 % — SIGNIFICANT CHANGE UP (ref 13–44)
MAGNESIUM SERPL-MCNC: 1.9 MG/DL — SIGNIFICANT CHANGE UP (ref 1.6–2.6)
MCHC RBC-ENTMCNC: 29.8 PG — SIGNIFICANT CHANGE UP (ref 27–34)
MCHC RBC-ENTMCNC: 31.6 GM/DL — LOW (ref 32–36)
MCV RBC AUTO: 94.4 FL — SIGNIFICANT CHANGE UP (ref 80–100)
MONOCYTES # BLD AUTO: 0.4 K/UL — SIGNIFICANT CHANGE UP (ref 0–0.9)
MONOCYTES NFR BLD AUTO: 7.1 % — SIGNIFICANT CHANGE UP (ref 2–14)
NEUTROPHILS # BLD AUTO: 2.7 K/UL — SIGNIFICANT CHANGE UP (ref 1.8–7.4)
NEUTROPHILS NFR BLD AUTO: 46.7 % — SIGNIFICANT CHANGE UP (ref 43–77)
PHOSPHATE SERPL-MCNC: 2.6 MG/DL — SIGNIFICANT CHANGE UP (ref 2.5–4.5)
PLATELET # BLD AUTO: 272 K/UL — SIGNIFICANT CHANGE UP (ref 150–400)
POTASSIUM SERPL-MCNC: 3.9 MMOL/L — SIGNIFICANT CHANGE UP (ref 3.5–5.3)
POTASSIUM SERPL-SCNC: 3.9 MMOL/L — SIGNIFICANT CHANGE UP (ref 3.5–5.3)
PROT SERPL-MCNC: 6.4 G/DL — SIGNIFICANT CHANGE UP (ref 6–8.3)
RBC # BLD: 3.66 M/UL — LOW (ref 3.8–5.2)
RBC # FLD: 11.7 % — SIGNIFICANT CHANGE UP (ref 10.3–14.5)
SODIUM SERPL-SCNC: 139 MMOL/L — SIGNIFICANT CHANGE UP (ref 135–145)
WBC # BLD: 5.7 K/UL — SIGNIFICANT CHANGE UP (ref 3.8–10.5)
WBC # FLD AUTO: 5.7 K/UL — SIGNIFICANT CHANGE UP (ref 3.8–10.5)

## 2017-10-25 PROCEDURE — 71010: CPT | Mod: 26

## 2017-10-25 RX ADMIN — HEPARIN SODIUM 5000 UNIT(S): 5000 INJECTION INTRAVENOUS; SUBCUTANEOUS at 14:39

## 2017-10-25 RX ADMIN — NYSTATIN CREAM 1 APPLICATION(S): 100000 CREAM TOPICAL at 14:40

## 2017-10-25 RX ADMIN — Medication 3 MILLILITER(S): at 09:58

## 2017-10-25 RX ADMIN — Medication 3 MILLILITER(S): at 14:58

## 2017-10-25 RX ADMIN — LOSARTAN POTASSIUM 25 MILLIGRAM(S): 100 TABLET, FILM COATED ORAL at 05:51

## 2017-10-25 RX ADMIN — Medication 3 MILLILITER(S): at 21:51

## 2017-10-25 RX ADMIN — Medication 3 MILLILITER(S): at 03:49

## 2017-10-25 RX ADMIN — SENNA PLUS 2 TABLET(S): 8.6 TABLET ORAL at 21:27

## 2017-10-25 RX ADMIN — NYSTATIN CREAM 1 APPLICATION(S): 100000 CREAM TOPICAL at 21:28

## 2017-10-25 RX ADMIN — HEPARIN SODIUM 5000 UNIT(S): 5000 INJECTION INTRAVENOUS; SUBCUTANEOUS at 21:27

## 2017-10-25 RX ADMIN — HEPARIN SODIUM 5000 UNIT(S): 5000 INJECTION INTRAVENOUS; SUBCUTANEOUS at 05:51

## 2017-10-25 RX ADMIN — Medication 1 DROP(S): at 05:50

## 2017-10-25 RX ADMIN — POLYETHYLENE GLYCOL 3350 17 GRAM(S): 17 POWDER, FOR SOLUTION ORAL at 21:27

## 2017-10-25 RX ADMIN — PANTOPRAZOLE SODIUM 40 MILLIGRAM(S): 20 TABLET, DELAYED RELEASE ORAL at 06:03

## 2017-10-25 RX ADMIN — NYSTATIN CREAM 1 APPLICATION(S): 100000 CREAM TOPICAL at 05:51

## 2017-10-25 RX ADMIN — Medication 1 DROP(S): at 17:14

## 2017-10-25 RX ADMIN — Medication 12.5 MILLIGRAM(S): at 05:51

## 2017-10-25 NOTE — PROGRESS NOTE ADULT - ATTENDING COMMENTS
85 year old female who p/w aspiration PNA and required intubation. hospital course complicated with elevated trop and possible GIB, with no active bleeding noted at this time. pt extubated 10/23    pt doing wel, no cp, sob, palp, nv  oob    assessment  acute resp failure s/p extubation 10/23 for aspiration pna      plan  change abx to zosyn  cardio input appreciated  gi f/u - monitor cbc . protonix oral  OOB, PT  transfer to medical floor.  awaiting bed 86 year old female who p/w aspiration PNA and required intubation. hospital course complicated with elevated trop and possible GIB, with no active bleeding noted at this time. pt extubated 10/23    pt doing wel, no cp, sob, palp, nv  oob    assessment  acute resp failure s/p extubation 10/23 for aspiration pna      plan  change abx to zosyn  cardio input appreciated  gi f/u - monitor cbc . protonix oral  OOB, PT  transfer to medical floor.  awaiting bed

## 2017-10-25 NOTE — PROGRESS NOTE ADULT - PROBLEM SELECTOR PLAN 3
- currently on  antibiotics per ID - levoFLOXacin IV  - Is s/p long  - UCx = -<50,000 ecoli   - ID following

## 2017-10-25 NOTE — PROGRESS NOTE ADULT - SUBJECTIVE AND OBJECTIVE BOX
Medicine attending note:  Patient seen in ICU    Patient is a 85y old  Female who presents with a chief complaint of Fever (21 Oct 2017 19:30)    Patient was extubated yesterday in ICU, doing well. Patient reports she is feeling tired otherwise no other complaints  Grand daughter at bedside    MEDICATIONS  (STANDING):  ALBUTerol/ipratropium for Nebulization 3 milliLiter(s) Nebulizer every 6 hours  artificial  tears Solution 1 Drop(s) Both EYES two times a day  heparin  Injectable 5000 Unit(s) SubCutaneous every 8 hours  hydrochlorothiazide 12.5 milliGRAM(s) Oral daily  losartan 25 milliGRAM(s) Oral daily  nystatin Powder 1 Application(s) Topical three times a day  pantoprazole    Tablet 40 milliGRAM(s) Oral before breakfast  polyethylene glycol 3350 17 Gram(s) Oral at bedtime  senna 2 Tablet(s) Oral at bedtime    MEDICATIONS  (PRN):  acetaminophen   Tablet. 650 milliGRAM(s) Oral every 6 hours PRN Mild Pain (1 - 3)  acetaminophen  Suppository 650 milliGRAM(s) Rectal every 6 hours PRN For Temp greater than 38 C (100.4 F)      REVIEW OF SYSTEMS:  CONSTITUTIONAL: No fever, weight loss, or fatigue  EYES: No eye pain,  or discharge  ENMT:  No tinnitus, vertigo; No sinus or throat pain  NECK: No pain or stiffness  RESPIRATORY: No cough, wheezing, chills or hemoptysis; No shortness of breath  CARDIOVASCULAR: No chest pain, palpitations, dizziness, or leg swelling  GASTROINTESTINAL: No abdominal or epigastric pain. No nausea, vomiting, or hematemesis; No diarrhea or constipation. No melena or hematochezia.  GENITOURINARY: No dysuria, frequency, hematuria, or incontinence  NEUROLOGICAL: No headaches, memory loss, loss of strength, numbness, or tremors  SKIN: No itching, burning, rashes, or lesions   LYMPH NODES: No enlarged glands  ENDOCRINE: No heat or cold intolerance; No hair loss  MUSCULOSKELETAL: has intermittent right knee pain, no other joint pain or swelling; No muscle, back, or extremity pain  PSYCHIATRIC: No depression, anxiety, mood swings, or difficulty sleeping  HEME/LYMPH: No easy bruising, or bleeding gums  ALLERY AND IMMUNOLOGIC: No hives or eczema           PHYSICAL EXAM:    T(C): 37.1 (10-25-17 @ 15:00), Max: 37.1 (10-25-17 @ 15:00)  HR: 75 (10-25-17 @ 18:00) (48 - 77)  BP: 121/62 (10-25-17 @ 18:00) (91/74 - 169/65)  RR: 23 (10-25-17 @ 18:00) (15 - 25)  SpO2: 97% (10-25-17 @ 18:00) (90% - 100%)  Wt(kg): --  I&O's Summary    24 Oct 2017 07:01  -  25 Oct 2017 07:00  --------------------------------------------------------  IN: 120 mL / OUT: 350 mL / NET: -230 mL    25 Oct 2017 07:01  -  25 Oct 2017 18:08  --------------------------------------------------------  IN: 850 mL / OUT: 250 mL / NET: 600 mL          GENERAL: Elderly female, NAD, s/p extubation, well-groomed, well-developed  HEAD:  Atraumatic, Normocephalic  EYES:  conjunctiva and sclera clear  ENMT: Moist mucous membranes, No lesions  NECK: Supple, No JVD, Normal thyroid  NERVOUS SYSTEM:  Awake, alert, no focal deficit  CHEST/LUNG: Decreased breath sounds on right side, otherwise clear to auscultation anteriorly; No rales, rhonchi, wheezing, or rubs  HEART: Regular rate and rhythm; No murmurs, rubs, or gallops  ABDOMEN: Soft, Nontender, Nondistended; Bowel sounds present  EXTREMITIES:  2+ Peripheral Pulses, well healed surgical scar right foot, No clubbing, cyanosis,+ trace edema  LYMPH: No lymphadenopathy noted        LABS:                             10.9   5.7   )-----------( 272      ( 25 Oct 2017 05:57 )             34.5     10-25    139  |  103  |  10  ----------------------------<  118<H>  3.9   |  30  |  0.56    Ca    8.6      25 Oct 2017 05:57  Phos  2.6     10-25  Mg     1.9     10-25    TPro  6.4  /  Alb  2.6<L>  /  TBili  0.8  /  DBili  x   /  AST  71<H>  /  ALT  72<H>  /  AlkPhos  130<H>  10-25              RADIOLOGY & ADDITIONAL TESTS:    < from: Xray Chest 1 View AP/PA (10.21.17 @ 15:36) >  IMPRESSION:    right perihilar lower lobe patchy airspace consolidation.   Cardiomegaly. Infectious pneumonia should be considered..          < end of copied text >    < from: US Duplex Venous Lower Ext Complete, Bilateral (10.21.17 @ 21:27) >  IMPRESSION:  No evidence of deep vein thrombosis in the visualized veins   of either leg. Poor visualization of bilateral mid to distal femoral and   calf veins.    < end of copied text >    [x ]GOALS OF CARE DISCUSSION WITH PATIENT/FAMILY/PROXY: full code    Imaging Personally Reviewed:  [x ] YES  [ ] NO    Consultant(s) Notes Reviewed:  [x] YES  [ ] NO    Care Discussed with Consultants/Other Providers [x] YES  [ ] NO

## 2017-10-25 NOTE — PROGRESS NOTE ADULT - ASSESSMENT
86yo F with pmhx of legal blindness, HTN, and ?prediabetes came in with c/o fever since the past 2-3 days and looking increasingly weak on day of admission. Pt was initially given ceftriaxone and azithro for covering CAP and UTI as CXR showed right sided infiltrate and u/a was positive.   pt was code sepsis in ER. she had WBC 4k with 13% bands, elevated transaminases and mildly elevated lactate. Patient had RR on floor for respiratory distress and AMS> she had hypoxia to 75% and was intubated by anesthesia and now in ICU.   ABX was broadened to zosyn and levofloxacin    Impression:   1. right sided pneumonia, community acquired. CXR from yesterday improved. Blood culture and urine legionella ag negative. sputum culture no growth.   2. acute hypoxic respiratory failure sec to pneumonia  3. positive urinalysis: asymptomatic, u/a collection(from bedpan) unlikley clean catch. growing e coli, quinolone sensitive.     Plan:  ·	PO levofloxacin to complete 7 days of rx.      ·	follow up blood cultures and urine cultures  ·	follow up urine legionella ag and send pneumococcal ag  ·	if any ET tube secretions send it for gram stain and culture.   ·	continue Zosyn and levofloxacin for now  ·	ICU care.  Case discussed in detail with the primary team.  Thanks for this consultation. please call me if any questions at [phone]. 85yo F with pmhx of legal blindness, HTN, and ?prediabetes came in with c/o fever since the past 2-3 days and looking increasingly weak on day of admission. Pt was initially given ceftriaxone and azithro for covering CAP and UTI as CXR showed right sided infiltrate and u/a was positive.   pt was code sepsis in ER. she had WBC 4k with 13% bands, elevated transaminases and mildly elevated lactate. Patient had RR on floor for respiratory distress and AMS> she had hypoxia to 75% and was intubated by anesthesia and now in ICU.   ABX was broadened to zosyn and levofloxacin    Impression:   1. right sided pneumonia, community acquired. CXR from yesterday improved. Blood culture and urine legionella ag negative. sputum culture no growth.   2. acute hypoxic respiratory failure sec to pneumonia  3. positive urinalysis: asymptomatic, u/a collection(from bedpan) unlikley clean catch. growing e coli, quinolone sensitive.     Plan:  ·	PO levofloxacin to complete 7 days of rx.      ·	follow up blood cultures and urine cultures  ·	follow up urine legionella ag and send pneumococcal ag  ·	if any ET tube secretions send it for gram stain and culture.   ·	continue Zosyn and levofloxacin for now  ·	ICU care.  Case discussed in detail with the primary team.  Thanks for this consultation. please call me if any questions at [phone].

## 2017-10-25 NOTE — PROGRESS NOTE ADULT - SUBJECTIVE AND OBJECTIVE BOX
INTERVAL HPI/OVERNIGHT EVENTS: no events    PRESSORS: [ ] YES [ x] NO  WHICH:    ANTIBIOTICS:   levaquin               DATE STARTED: 10/24    Antimicrobial:  levoFLOXacin IVPB      levoFLOXacin IVPB 750 milliGRAM(s) IV Intermittent every 24 hours    Cardiovascular:  hydrochlorothiazide 12.5 milliGRAM(s) Oral daily  losartan 25 milliGRAM(s) Oral daily    Pulmonary:  ALBUTerol/ipratropium for Nebulization 3 milliLiter(s) Nebulizer every 6 hours    Hematalogic:  heparin  Injectable 5000 Unit(s) SubCutaneous every 8 hours    Other:  acetaminophen   Tablet. 650 milliGRAM(s) Oral every 6 hours PRN  acetaminophen  Suppository 650 milliGRAM(s) Rectal every 6 hours PRN  artificial  tears Solution 1 Drop(s) Both EYES two times a day  nystatin Powder 1 Application(s) Topical three times a day  pantoprazole    Tablet 40 milliGRAM(s) Oral before breakfast  polyethylene glycol 3350 17 Gram(s) Oral at bedtime  senna 2 Tablet(s) Oral at bedtime    acetaminophen   Tablet. 650 milliGRAM(s) Oral every 6 hours PRN  acetaminophen  Suppository 650 milliGRAM(s) Rectal every 6 hours PRN  ALBUTerol/ipratropium for Nebulization 3 milliLiter(s) Nebulizer every 6 hours  artificial  tears Solution 1 Drop(s) Both EYES two times a day  heparin  Injectable 5000 Unit(s) SubCutaneous every 8 hours  hydrochlorothiazide 12.5 milliGRAM(s) Oral daily  levoFLOXacin IVPB      levoFLOXacin IVPB 750 milliGRAM(s) IV Intermittent every 24 hours  losartan 25 milliGRAM(s) Oral daily  nystatin Powder 1 Application(s) Topical three times a day  pantoprazole    Tablet 40 milliGRAM(s) Oral before breakfast  polyethylene glycol 3350 17 Gram(s) Oral at bedtime  senna 2 Tablet(s) Oral at bedtime    Drug Dosing Weight  Height (cm): 175.26 (21 Oct 2017 14:24)  Weight (kg): 124.7 (21 Oct 2017 14:24)  BMI (kg/m2): 40.6 (21 Oct 2017 14:24)  BSA (m2): 2.36 (21 Oct 2017 14:24)    CENTRAL LINE: [ ] YES [ x] NO  LOCATION:   DATE INSERTED:  REMOVE: [ ] YES [ ] NO  EXPLAIN:    AGUIRRE: [ ] YES [ x] NO    DATE INSERTED:  REMOVE:  [ ] YES [ ] NO  EXPLAIN:    A-LINE:  [ ] YES [ x] NO  LOCATION:   DATE INSERTED:  REMOVE:  [ ] YES [ ] NO  EXPLAIN:    PMH -reviewed admission note, no change since admission    ICU Vital Signs Last 24 Hrs  T(C): 36.6 (25 Oct 2017 06:00), Max: 36.8 (24 Oct 2017 15:22)  T(F): 97.9 (25 Oct 2017 06:00), Max: 98.2 (24 Oct 2017 15:22)  HR: 64 (25 Oct 2017 08:00) (48 - 76)  BP: 161/99 (25 Oct 2017 08:00) (105/85 - 169/65)  BP(mean): 113 (25 Oct 2017 08:00) (67 - 127)  ABP: --  ABP(mean): --  RR: 16 (25 Oct 2017 08:00) (15 - 25)  SpO2: 100% (25 Oct 2017 08:00) (86% - 100%)    ABG - ( 23 Oct 2017 10:34 )  pH: 7.38  /  pCO2: 43    /  pO2: 132   / HCO3: 25    / Base Excess: 0.7   /  SaO2: 98        10-24 @ 07:01  -  10-25 @ 07:00  --------------------------------------------------------  IN: 120 mL / OUT: 350 mL / NET: -230 mL    PHYSICAL EXAM:    GENERAL:   HEAD: atraumatic, normocephalic   EYES: PERRLA, white sclera.   ENMT: nasal mucosa-moist, Oral cavity- no exudate  NECK: supple, JVD/ no JVD  SKIN: warm, dry   CHEST/LUNG:  No Chest deformity , no chest tenderness. bilateral breath sounds,no  adventitious sounds  HEART: RRR, no m/r/g   ABDOMEN: soft, nontender, nondistended; bowel sounds.  EXTREMITIES: no peripheral edema, no cyanosis, no clubbing.  NEURO: AA0X3 , no focal neuro deficits    LABS:  CBC Full  -  ( 25 Oct 2017 05:57 )  WBC Count : 5.7 K/uL  Hemoglobin : 10.9 g/dL  Hematocrit : 34.5 %  Platelet Count - Automated : 272 K/uL  Mean Cell Volume : 94.4 fl  Mean Cell Hemoglobin : 29.8 pg  Mean Cell Hemoglobin Concentration : 31.6 gm/dL  Auto Neutrophil # : 2.7 K/uL  Auto Lymphocyte # : 1.9 K/uL  Auto Monocyte # : 0.4 K/uL  Auto Eosinophil # : 0.6 K/uL  Auto Basophil # : 0.1 K/uL  Auto Neutrophil % : 46.7 %  Auto Lymphocyte % : 33.9 %  Auto Monocyte % : 7.1 %  Auto Eosinophil % : 11.0 %  Auto Basophil % : 1.4 %    10-25    139  |  103  |  10  ----------------------------<  118<H>  3.9   |  30  |  0.56    Ca    8.6      25 Oct 2017 05:57  Phos  2.6     10-25  Mg     1.9     10-25    TPro  6.4  /  Alb  2.6<L>  /  TBili  0.8  /  DBili  x   /  AST  71<H>  /  ALT  72<H>  /  AlkPhos  130<H>  10-25        Culture Results:   No growth at 48 hours (10-22 @ 16:26)      RADIOLOGY & ADDITIONAL STUDIES REVIEWED:  ***    [ ]GOALS OF CARE DISCUSSION WITH PATIENT/FAMILY/PROXY:    CRITICAL CARE TIME SPENT: 35 minutes INTERVAL HPI/OVERNIGHT EVENTS: no events    PRESSORS: [ ] YES [ x] NO  WHICH:    ANTIBIOTICS:   levaquin               DATE STARTED: 10/24    Antimicrobial:  levoFLOXacin IVPB      levoFLOXacin IVPB 750 milliGRAM(s) IV Intermittent every 24 hours    Cardiovascular:  hydrochlorothiazide 12.5 milliGRAM(s) Oral daily  losartan 25 milliGRAM(s) Oral daily    Pulmonary:  ALBUTerol/ipratropium for Nebulization 3 milliLiter(s) Nebulizer every 6 hours    Hematalogic:  heparin  Injectable 5000 Unit(s) SubCutaneous every 8 hours    Other:  acetaminophen   Tablet. 650 milliGRAM(s) Oral every 6 hours PRN  acetaminophen  Suppository 650 milliGRAM(s) Rectal every 6 hours PRN  artificial  tears Solution 1 Drop(s) Both EYES two times a day  nystatin Powder 1 Application(s) Topical three times a day  pantoprazole    Tablet 40 milliGRAM(s) Oral before breakfast  polyethylene glycol 3350 17 Gram(s) Oral at bedtime  senna 2 Tablet(s) Oral at bedtime    acetaminophen   Tablet. 650 milliGRAM(s) Oral every 6 hours PRN  acetaminophen  Suppository 650 milliGRAM(s) Rectal every 6 hours PRN  ALBUTerol/ipratropium for Nebulization 3 milliLiter(s) Nebulizer every 6 hours  artificial  tears Solution 1 Drop(s) Both EYES two times a day  heparin  Injectable 5000 Unit(s) SubCutaneous every 8 hours  hydrochlorothiazide 12.5 milliGRAM(s) Oral daily  levoFLOXacin IVPB      levoFLOXacin IVPB 750 milliGRAM(s) IV Intermittent every 24 hours  losartan 25 milliGRAM(s) Oral daily  nystatin Powder 1 Application(s) Topical three times a day  pantoprazole    Tablet 40 milliGRAM(s) Oral before breakfast  polyethylene glycol 3350 17 Gram(s) Oral at bedtime  senna 2 Tablet(s) Oral at bedtime    Drug Dosing Weight  Height (cm): 175.26 (21 Oct 2017 14:24)  Weight (kg): 124.7 (21 Oct 2017 14:24)  BMI (kg/m2): 40.6 (21 Oct 2017 14:24)  BSA (m2): 2.36 (21 Oct 2017 14:24)    CENTRAL LINE: [ ] YES [ x] NO  LOCATION:   DATE INSERTED:  REMOVE: [ ] YES [ ] NO  EXPLAIN:    AGUIRRE: [ ] YES [ x] NO    DATE INSERTED:  REMOVE:  [ ] YES [ ] NO  EXPLAIN:    A-LINE:  [ ] YES [ x] NO  LOCATION:   DATE INSERTED:  REMOVE:  [ ] YES [ ] NO  EXPLAIN:    PMH -reviewed admission note, no change since admission    ICU Vital Signs Last 24 Hrs  T(C): 36.6 (25 Oct 2017 06:00), Max: 36.8 (24 Oct 2017 15:22)  T(F): 97.9 (25 Oct 2017 06:00), Max: 98.2 (24 Oct 2017 15:22)  HR: 64 (25 Oct 2017 08:00) (48 - 76)  BP: 161/99 (25 Oct 2017 08:00) (105/85 - 169/65)  BP(mean): 113 (25 Oct 2017 08:00) (67 - 127)  ABP: --  ABP(mean): --  RR: 16 (25 Oct 2017 08:00) (15 - 25)  SpO2: 100% (25 Oct 2017 08:00) (86% - 100%)    ABG - ( 23 Oct 2017 10:34 )  pH: 7.38  /  pCO2: 43    /  pO2: 132   / HCO3: 25    / Base Excess: 0.7   /  SaO2: 98        10-24 @ 07:01  -  10-25 @ 07:00  --------------------------------------------------------  IN: 120 mL / OUT: 350 mL / NET: -230 mL    PHYSICAL EXAM:    GENERAL:   HEAD: atraumatic, normocephalic   EYES: PERRLA, white sclera.   ENMT: nasal mucosa-moist, Oral cavity- no exudate  NECK: supple, JVD/ no JVD  SKIN: warm, dry   CHEST/LUNG:  No Chest deformity , no chest tenderness. bilateral breath sounds,no  adventitious sounds  HEART: RRR, no m/r/g   ABDOMEN: soft, nontender, nondistended; bowel sounds.  EXTREMITIES: no peripheral edema, no cyanosis, no clubbing.  NEURO: AA0X3 , no focal neuro deficits    LABS:  CBC Full  -  ( 25 Oct 2017 05:57 )  WBC Count : 5.7 K/uL  Hemoglobin : 10.9 g/dL  Hematocrit : 34.5 %  Platelet Count - Automated : 272 K/uL  Mean Cell Volume : 94.4 fl  Mean Cell Hemoglobin : 29.8 pg  Mean Cell Hemoglobin Concentration : 31.6 gm/dL  Auto Neutrophil # : 2.7 K/uL  Auto Lymphocyte # : 1.9 K/uL  Auto Monocyte # : 0.4 K/uL  Auto Eosinophil # : 0.6 K/uL  Auto Basophil # : 0.1 K/uL  Auto Neutrophil % : 46.7 %  Auto Lymphocyte % : 33.9 %  Auto Monocyte % : 7.1 %  Auto Eosinophil % : 11.0 %  Auto Basophil % : 1.4 %    10-25    139  |  103  |  10  ----------------------------<  118<H>  3.9   |  30  |  0.56    Ca    8.6      25 Oct 2017 05:57  Phos  2.6     10-25  Mg     1.9     10-25    TPro  6.4  /  Alb  2.6<L>  /  TBili  0.8  /  DBili  x   /  AST  71<H>  /  ALT  72<H>  /  AlkPhos  130<H>  10-25    Culture Results:   No growth at 48 hours (10-22 @ 16:26)    RADIOLOGY & ADDITIONAL STUDIES REVIEWED:      < from: Xray Chest 1 View AP- PORTABLE-Urgent (10.24.17 @ 10:59) >  FINDINGS/  IMPRESSION:  Interval enterictube removal.    Patchy right lung opacities are slightly improved compared to the prior   study. Mild diffuse interstitial prominence.    No pneumothorax or pleural effusion.     The cardiac silhouette is not accurately assessed by AP technique.    < end of copied text >    [ ]GOALS OF CARE DISCUSSION WITH PATIENT/FAMILY/PROXY: full code    CRITICAL CARE TIME SPENT: 35 minutes

## 2017-10-25 NOTE — PROGRESS NOTE ADULT - ASSESSMENT
86 yo female from home, walks with walker, lives with daughter and granddaughter, being cared for by family, PMH HTN, 'pre-diabetes,' right knee OA, legally blind 2/2 glaucoma/cataracts brought to ED by family for 1-2 days of weakness, and fevers. Patient presented with sepsis likely secondary to right multifocal pneumonia, legionella a concern given 'normal WBC' with bandemia and hyponatremia, positive urinalysis, and elevated LFTs.  Patient was intubated 10/21/2017 for hypoxia and moved to ICU. Patient is s/p extubation in ICU 10/23/2017

## 2017-10-25 NOTE — PROGRESS NOTE ADULT - PROBLEM SELECTOR PLAN 2
right sided, most likely contributed to fever on presentation  concern for legionella given hyponatremia and relative leukopenia with bandemia  currently on antibiotics-  per ID consult and ICU team  -negative urine legionella,  strep antigen  -negative BCx, sputum culture, RVP thus far    - follow up urine legionella, strep antigen, sputum culture, procalcitonin, RVP  - follow up Blood Cultures  - ID consulted by ICU team

## 2017-10-25 NOTE — PROGRESS NOTE ADULT - SUBJECTIVE AND OBJECTIVE BOX
Patient denies CP, SOB , N/C 2 liter  off all pressors     acetaminophen   Tablet. 650 milliGRAM(s) Oral every 6 hours PRN  acetaminophen  Suppository 650 milliGRAM(s) Rectal every 6 hours PRN  ALBUTerol/ipratropium for Nebulization 3 milliLiter(s) Nebulizer every 6 hours  artificial  tears Solution 1 Drop(s) Both EYES two times a day  heparin  Injectable 5000 Unit(s) SubCutaneous every 8 hours  hydrochlorothiazide 12.5 milliGRAM(s) Oral daily  levoFLOXacin IVPB      levoFLOXacin IVPB 750 milliGRAM(s) IV Intermittent every 24 hours  losartan 25 milliGRAM(s) Oral daily  nystatin Powder 1 Application(s) Topical three times a day  pantoprazole    Tablet 40 milliGRAM(s) Oral before breakfast  polyethylene glycol 3350 17 Gram(s) Oral at bedtime  senna 2 Tablet(s) Oral at bedtime                            10.9   5.7   )-----------( 272      ( 25 Oct 2017 05:57 )             34.5       Hemoglobin: 10.9 g/dL (10-25 @ 05:57)  Hemoglobin: 11.2 g/dL (10-24 @ 06:34)  Hemoglobin: 10.8 g/dL (10-23 @ 06:35)  Hemoglobin: 11.6 g/dL (10-22 @ 03:48)  Hemoglobin: 13.0 g/dL (10-22 @ 00:29)      10-25    139  |  103  |  10  ----------------------------<  118<H>  3.9   |  30  |  0.56    Ca    8.6      25 Oct 2017 05:57  Phos  2.6     10-25  Mg     1.9     10-25    TPro  6.4  /  Alb  2.6<L>  /  TBili  0.8  /  DBili  x   /  AST  71<H>  /  ALT  72<H>  /  AlkPhos  130<H>  10-25    Creatinine Trend: 0.56<--, 0.66<--, 0.60<--, 0.81<--, 0.87<--, 0.97<--    COAGS:     CARDIAC MARKERS ( 22 Oct 2017 13:00 )  1.260 ng/mL / x     / 947 U/L / x     / 8.0 ng/mL        T(C): 36.6 (10-25-17 @ 06:00), Max: 36.8 (10-24-17 @ 15:22)  HR: 57 (10-25-17 @ 07:00) (48 - 76)  BP: 161/77 (10-25-17 @ 07:00) (105/85 - 169/65)  RR: 17 (10-25-17 @ 07:00) (15 - 25)  SpO2: 99% (10-25-17 @ 07:00) (86% - 100%)  Wt(kg): --    I&O's Summary    24 Oct 2017 07:01  -  25 Oct 2017 07:00  --------------------------------------------------------  IN: 120 mL / OUT: 350 mL / NET: -230 mL      HEENT:   Normal oral mucosa, PERRL, EOMI	  Lymphatic: No obvious lymphadenopathy , no edema  Cardiovascular: Normal S1 S2, No JVD, 1/6 TAI murmur, Peripheral pulses palpable 2+ bilaterally  Respiratory: Lungs clear to auscultation, normal effort 	  Gastrointestinal:  Soft, Non-tender, + BS	    TELEMETRY: 	  Sinus  echo   < from: Transthoracic Echocardiogram (10.22.17 @ 07:56) >  ---------------------------  CONCLUSIONS:  1. Normal mitral valve. Mild mitral regurgitation.  2. Normal trileaflet aortic valve. Mild aortic  regurgitation.  3. Aortic Root: 3.8 cm.  4. Normal left atrium.  5. Normal left ventricular internal dimensions and wall  thicknesses.  6. Normal Left Ventricular Systolic Function,  (EF = 55 to  60%)  7. Grade II diastolic dysfunction.  8. Normal right atrium.  9. Normal right ventricular size and function.  10. There is mild tricuspid regurgitation.  11. Normal pericardium with no pericardial effusion.    < end of copied text >    ASSESSMENT/PLAN: 	86y Female mostly bedbound HTN, and ?prediabetes came in with c/o fever since the past 2-3 days. found with sepsis due to UTI and PNA (+) Trop. Normal LV and RV function on echo.    - Aggressive chest pt   - Asa and statin  - suspect demand ischemia  - Abx per MICU  - Cont afterload reduction with ACE, hctz  -  GI / DVT prophylaxis.  - keep K>4, mag >2.0    D/W Dr Mensah Patient denies CP, SOB , N/C 2 liter  off all pressors     acetaminophen   Tablet. 650 milliGRAM(s) Oral every 6 hours PRN  acetaminophen  Suppository 650 milliGRAM(s) Rectal every 6 hours PRN  ALBUTerol/ipratropium for Nebulization 3 milliLiter(s) Nebulizer every 6 hours  artificial  tears Solution 1 Drop(s) Both EYES two times a day  heparin  Injectable 5000 Unit(s) SubCutaneous every 8 hours  hydrochlorothiazide 12.5 milliGRAM(s) Oral daily  levoFLOXacin IVPB      levoFLOXacin IVPB 750 milliGRAM(s) IV Intermittent every 24 hours  losartan 25 milliGRAM(s) Oral daily  nystatin Powder 1 Application(s) Topical three times a day  pantoprazole    Tablet 40 milliGRAM(s) Oral before breakfast  polyethylene glycol 3350 17 Gram(s) Oral at bedtime  senna 2 Tablet(s) Oral at bedtime                            10.9   5.7   )-----------( 272      ( 25 Oct 2017 05:57 )             34.5       Hemoglobin: 10.9 g/dL (10-25 @ 05:57)  Hemoglobin: 11.2 g/dL (10-24 @ 06:34)  Hemoglobin: 10.8 g/dL (10-23 @ 06:35)  Hemoglobin: 11.6 g/dL (10-22 @ 03:48)  Hemoglobin: 13.0 g/dL (10-22 @ 00:29)      10-25    139  |  103  |  10  ----------------------------<  118<H>  3.9   |  30  |  0.56    Ca    8.6      25 Oct 2017 05:57  Phos  2.6     10-25  Mg     1.9     10-25    TPro  6.4  /  Alb  2.6<L>  /  TBili  0.8  /  DBili  x   /  AST  71<H>  /  ALT  72<H>  /  AlkPhos  130<H>  10-25    Creatinine Trend: 0.56<--, 0.66<--, 0.60<--, 0.81<--, 0.87<--, 0.97<--    COAGS:     CARDIAC MARKERS ( 22 Oct 2017 13:00 )  1.260 ng/mL / x     / 947 U/L / x     / 8.0 ng/mL        T(C): 36.6 (10-25-17 @ 06:00), Max: 36.8 (10-24-17 @ 15:22)  HR: 57 (10-25-17 @ 07:00) (48 - 76)  BP: 161/77 (10-25-17 @ 07:00) (105/85 - 169/65)  RR: 17 (10-25-17 @ 07:00) (15 - 25)  SpO2: 99% (10-25-17 @ 07:00) (86% - 100%)  Wt(kg): --    I&O's Summary    24 Oct 2017 07:01  -  25 Oct 2017 07:00  --------------------------------------------------------  IN: 120 mL / OUT: 350 mL / NET: -230 mL      HEENT:   Normal oral mucosa, PERRL, EOMI	  Lymphatic: No obvious lymphadenopathy , no edema  Cardiovascular: Normal S1 S2, No JVD, 1/6 TAI murmur, Peripheral pulses palpable 2+ bilaterally  Respiratory: Lungs clear to auscultation, normal effort 	  Gastrointestinal:  Soft, Non-tender, + BS	    TELEMETRY: 	  Sinus  echo   < from: Transthoracic Echocardiogram (10.22.17 @ 07:56) >  ---------------------------  CONCLUSIONS:  1. Normal mitral valve. Mild mitral regurgitation.  2. Normal trileaflet aortic valve. Mild aortic  regurgitation.  3. Aortic Root: 3.8 cm.  4. Normal left atrium.  5. Normal left ventricular internal dimensions and wall  thicknesses.  6. Normal Left Ventricular Systolic Function,  (EF = 55 to  60%)  7. Grade II diastolic dysfunction.  8. Normal right atrium.  9. Normal right ventricular size and function.  10. There is mild tricuspid regurgitation.  11. Normal pericardium with no pericardial effusion.    < end of copied text >    ASSESSMENT/PLAN: 	86y Female mostly bedbound HTN, and ?prediabetes came in with c/o fever since the past 2-3 days. found with sepsis due to UTI and PNA (+) Trop. Normal LV and RV function on echo.    - Aggressive chest pt   - Asa and statin  - suspect demand ischemia  - Abx per MICU  -  GI / DVT prophylaxis.  - keep K>4, mag >2.0    D/W Dr Mensah

## 2017-10-25 NOTE — PROGRESS NOTE ADULT - PROBLEM SELECTOR PLAN 1
s/p extubation 10/23/2014 in ICU, was s/p intubation on  10/21/2017,  per ICU team protocol, is doing well, plan to downgrade to medical floor tomorrow

## 2017-10-25 NOTE — PROGRESS NOTE ADULT - ASSESSMENT
86yo female from home, walks with walker, lives with daughter and granddaughter, being cared for by family, PMH HTN, 'pre-diabetes,' right knee OA, legally blind 2/2 glaucoma/cataracts brought to ED by family 2/2 1-2 days of weakness, subjective fever, chills.    Patient presents with sepsis likely 2/2 right multifocal pneumonia, legionella a concern given 'normal WBC' with bandemia and hyponatremia and elevated LFT.      Problem/Recommendation - 1:  Problem: Acute respiratory failure with hypoxia. Recommendation: 2/2 right multifocal pneumonia, severe, requiring mechanical ventilation  CURB-65 score = 3  -extubated on 10/23  s/p vancomycin 1 gm and zosyn in ed  - c/w levaquin  -negative urine legionella, RVP and sputum cx  neg strep antigen  f/u procalcitonin  -negative BCx     Problem/Recommendation - 2:  ·  Problem: Sepsis, Recommendation: 2/3 qsofa criteria met (change in mental status, tachypnea) 2/2 right multifocal pneumonia and UTI  now resolved  lactate normalized after 3700cc fluid  -of fluids  - c/w levaquin  -UCx <50,000 e.coli, pansensitive  -blood culture neg       Problem/Recommendation - 3:  ·  Problem: Urinary tract infection without hematuria, site unspecified.  Recommendation: likely contributing to sepsis  - on levaquin  -<50,000 ecoli UCx (pan sensitive)     Problem/Recommendation - 4:  ·  Problem: Gastrointestinal hemorrhage, unspecified gastrointestinal hemorrhage type.  Recommendation: bright red blood via NGT 2/2 ? traumatic intubation, possibly UGIB  - protonix po  - GI consult Dr. Cedillo- signed off, patient is stable     Problem/Recommendation - 5:  ·  Problem: Transaminitis.  Recommendation: possibly 2/2 hypovolemia  not obstructive picture  possibly 2/2 sepsis, hepatic congestion 2/2 CHF less likely  -trending down     Problem/Recommendation - 6:  Problem: Hyponatremia. Recommendation: possibly 2/2 legionella pneumonia  hypovolemic hyponatremia  -resolved     Problem/Recommendation - 7:  Problem: History of hypertension. Recommendation:   resumed losartan and HCTZ, monitoring BP     Problem/Recommendation - 8:  Problem: Need for prophylactic measure. Recommendation: bleeding from NGT   improve score =3, heparin sq,  protonix po. 87yo female from home, walks with walker, lives with daughter and granddaughter, being cared for by family, PMH HTN, 'pre-diabetes,' right knee OA, legally blind 2/2 glaucoma/cataracts brought to ED by family 2/2 1-2 days of weakness, subjective fever, chills.    Patient presents with sepsis likely 2/2 right multifocal pneumonia, legionella a concern given 'normal WBC' with bandemia and hyponatremia and elevated LFT.      Problem/Recommendation - 1:  Problem: Acute respiratory failure with hypoxia. Recommendation: 2/2 right multifocal pneumonia, severe, requiring mechanical ventilation  CURB-65 score = 3  -extubated on 10/23  s/p vancomycin 1 gm and zosyn in ed  - c/w levaquin  -negative urine legionella, RVP and sputum cx  neg strep antigen  f/u procalcitonin  -negative BCx     Problem/Recommendation - 2:  ·  Problem: Sepsis, Recommendation: 2/3 qsofa criteria met (change in mental status, tachypnea) 2/2 right multifocal pneumonia and UTI  now resolved  lactate normalized after 3700cc fluid  -of fluids  - c/w levaquin  -UCx <50,000 e.coli, pansensitive  -blood culture neg       Problem/Recommendation - 3:  ·  Problem: Urinary tract infection without hematuria, site unspecified.  Recommendation: likely contributing to sepsis  - on levaquin  -<50,000 ecoli UCx (pan sensitive)     Problem/Recommendation - 4:  ·  Problem: Gastrointestinal hemorrhage, unspecified gastrointestinal hemorrhage type.  Recommendation: bright red blood via NGT 2/2 ? traumatic intubation, possibly UGIB  - protonix po  - GI consult Dr. Cedillo- signed off, patient is stable     Problem/Recommendation - 5:  ·  Problem: Transaminitis.  Recommendation: possibly 2/2 hypovolemia  not obstructive picture  possibly 2/2 sepsis, hepatic congestion 2/2 CHF less likely  -trending down     Problem/Recommendation - 6:  Problem: Hyponatremia. Recommendation: possibly 2/2 legionella pneumonia  hypovolemic hyponatremia  -resolved     Problem/Recommendation - 7:  Problem: History of hypertension. Recommendation:   resumed losartan and HCTZ, monitoring BP     Problem/Recommendation - 8:  Problem: Need for prophylactic measure. Recommendation: bleeding from NGT   improve score =3, heparin sq,  protonix po.

## 2017-10-25 NOTE — PROGRESS NOTE ADULT - SUBJECTIVE AND OBJECTIVE BOX
Patient seen and examined.   Afebrile.   No acute distress.   No new symptoms.     MEDICATIONS  (STANDING):  ALBUTerol/ipratropium for Nebulization 3 milliLiter(s) Nebulizer every 6 hours  artificial  tears Solution 1 Drop(s) Both EYES two times a day  heparin  Injectable 5000 Unit(s) SubCutaneous every 8 hours  hydrochlorothiazide 12.5 milliGRAM(s) Oral daily  levoFLOXacin  Tablet 750 milliGRAM(s) Oral every 24 hours  losartan 25 milliGRAM(s) Oral daily  nystatin Powder 1 Application(s) Topical three times a day  pantoprazole    Tablet 40 milliGRAM(s) Oral before breakfast  polyethylene glycol 3350 17 Gram(s) Oral at bedtime  senna 2 Tablet(s) Oral at bedtime      MEDICATIONS  (PRN):  acetaminophen   Tablet. 650 milliGRAM(s) Oral every 6 hours PRN Mild Pain (1 - 3)  acetaminophen  Suppository 650 milliGRAM(s) Rectal every 6 hours PRN For Temp greater than 38 C (100.4 F)       Medications up to date at time of exam.      PHYSICAL EXAMINATION:      Vital Signs Last 24 Hrs  T(C): 36.6 (25 Oct 2017 06:00), Max: 36.8 (24 Oct 2017 15:22)  T(F): 97.9 (25 Oct 2017 06:00), Max: 98.2 (24 Oct 2017 15:22)  HR: 72 (25 Oct 2017 10:00) (48 - 76)  BP: 162/69 (25 Oct 2017 10:00) (105/85 - 169/65)  BP(mean): 92 (25 Oct 2017 10:00) (67 - 127)  RR: 20 (25 Oct 2017 10:00) (15 - 25)  SpO2: 98% (25 Oct 2017 10:00) (86% - 100%)   (if applicable)  GENERAL: The patient is a well-developed, well-nourished, in no apparent distress.     HEENT: orally intubated.     NECK: Supple, no palpable adenopathy.    LUNGS: BLAE +, basal crepts +. .    HEART: S1S2 normal.     ABDOMEN: Soft, nontender, and nondistended.  No hepatosplenomegaly is noted.    EXTREMITIES: Without any cyanosis, clubbing, rash, lesions or edema.    NEUROLOGIC: Awake, alert, responsive.    SKIN:   No new change noticed.        LABS:                        10.9   5.7   )-----------( 272      ( 25 Oct 2017 05:57 )             34.5     10-25    139  |  103  |  10  ----------------------------<  118<H>  3.9   |  30  |  0.56    Ca    8.6      25 Oct 2017 05:57  Phos  2.6     10-25  Mg     1.9     10-25    TPro  6.4  /  Alb  2.6<L>  /  TBili  0.8  /  DBili  x   /  AST  71<H>  /  ALT  72<H>  /  AlkPhos  130<H>  10-25        ABG - ( 23 Oct 2017 10:34 )  pH: 7.38  /  pCO2: 43    /  pO2: 132   / HCO3: 25    / Base Excess: 0.7   /  SaO2: 98                < from: Xray Chest 1 View AP -PORTABLE-Routine (10.25.17 @ 07:16) >  similar to prior. No change in vascular congestion interstitial edema. No   gross focal infiltrate. Possible trace right pleural effusion.    Impression: As above    < end of copied text >  Culture - Sputum . (10.22.17 @ 16:26)    Gram Stain:   No polymorphonuclear leukocytes per low power field  No Squamous epithelial cells per low power field  No organisms seen per oil power field    Specimen Source: .Sputum Sputum    Culture Results:   No growth at 48 hours    Culture - Blood (10.22.17 @ 00:50)    Specimen Source: .Blood Blood-Peripheral    Culture Results:   No growth to date.    Culture - Blood (10.22.17 @ 00:41)    Specimen Source: .Blood Blood-Peripheral    Culture Results:   No growth to date.                =    Culture - Urine (10.21.17 @ 23:33)    -  Cefazolin: I 16    -  Cefepime: S <=2    -  Cefoxitin: S <=4    -  Ceftazidime: S <=1    -  Ceftriaxone: S <=1    -  Ciprofloxacin: S <=0.5    -  Ertapenem: S <=0.5    -  Gentamicin: R >8    -  Imipenem: S <=1    -  Levofloxacin: S <=1    -  Meropenem: S <=1    -  Nitrofurantoin: S <=32    -  Piperacillin/Tazobactam: S <=8    -  Tobramycin: S 4    -  Trimethoprim/Sulfamethoxazole: R >2/38    -  Amikacin: S <=8    -  Ampicillin: R >16    -  Ampicillin/Sulbactam: R >16/8    -  Aztreonam: S <=4    Specimen Source: .Urine Catheterized    Culture Results:   10,000 - 49,000 CFU/mL Escherichia coli    Organism Identification: Escherichia coli    Organism: Escherichia coli    Method Type: NIDIA    MICROBIOLOGY: (if applicable)    RADIOLOGY & ADDITIONAL STUDIES:  EKG:   CXR:  ECHO:< from: Transthoracic Echocardiogram (10.22.17 @ 07:56) >  CONCLUSIONS:  1. Normal mitral valve. Mild mitral regurgitation.  2. Normal trileaflet aortic valve. Mild aortic  regurgitation.  3. Aortic Root: 3.8 cm.  4. Normal left atrium.  5. Normal left ventricular internal dimensions and wall  thicknesses.  6. Normal Left Ventricular Systolic Function,  (EF = 55 to  60%)  7. Grade II diastolic dysfunction.  8. Normal right atrium.  9. Normal right ventricular size and function.  10. There is mild tricuspid regurgitation.  11. Normal pericardium with no pericardial effusion.    < end of copied text >        RECOMMENDATIONS:

## 2017-10-25 NOTE — PROGRESS NOTE ADULT - ATTENDING COMMENTS
Patient seen and examined, agree with above assessment and plan as transcribed above.    - cont abx per MICU  - Remainder of cardiac workup will depend on clinical course    Tulio Mensah MD, [facility]  [facility], [facility]  [address] [place] [address]  PHONE:  [phone]  BEEPER : [phone]

## 2017-10-26 DIAGNOSIS — L24.1 IRRITANT CONTACT DERMATITIS DUE TO OILS AND GREASES: ICD-10-CM

## 2017-10-26 DIAGNOSIS — Z02.9 ENCOUNTER FOR ADMINISTRATIVE EXAMINATIONS, UNSPECIFIED: ICD-10-CM

## 2017-10-26 DIAGNOSIS — R06.89 OTHER ABNORMALITIES OF BREATHING: ICD-10-CM

## 2017-10-26 PROCEDURE — 71010: CPT | Mod: 26

## 2017-10-26 RX ORDER — HYDROCORTISONE 1 %
1 OINTMENT (GRAM) TOPICAL
Qty: 0 | Refills: 0 | Status: DISCONTINUED | OUTPATIENT
Start: 2017-10-26 | End: 2017-10-27

## 2017-10-26 RX ORDER — FUROSEMIDE 40 MG
40 TABLET ORAL DAILY
Qty: 0 | Refills: 0 | Status: DISCONTINUED | OUTPATIENT
Start: 2017-10-26 | End: 2017-10-27

## 2017-10-26 RX ADMIN — PANTOPRAZOLE SODIUM 40 MILLIGRAM(S): 20 TABLET, DELAYED RELEASE ORAL at 05:14

## 2017-10-26 RX ADMIN — Medication 3 MILLILITER(S): at 09:25

## 2017-10-26 RX ADMIN — Medication 650 MILLIGRAM(S): at 05:14

## 2017-10-26 RX ADMIN — Medication 12.5 MILLIGRAM(S): at 05:14

## 2017-10-26 RX ADMIN — NYSTATIN CREAM 1 APPLICATION(S): 100000 CREAM TOPICAL at 22:40

## 2017-10-26 RX ADMIN — Medication 650 MILLIGRAM(S): at 05:52

## 2017-10-26 RX ADMIN — Medication 1 APPLICATION(S): at 17:57

## 2017-10-26 RX ADMIN — HEPARIN SODIUM 5000 UNIT(S): 5000 INJECTION INTRAVENOUS; SUBCUTANEOUS at 14:03

## 2017-10-26 RX ADMIN — Medication 1 DROP(S): at 05:14

## 2017-10-26 RX ADMIN — Medication 3 MILLILITER(S): at 20:32

## 2017-10-26 RX ADMIN — NYSTATIN CREAM 1 APPLICATION(S): 100000 CREAM TOPICAL at 14:03

## 2017-10-26 RX ADMIN — POLYETHYLENE GLYCOL 3350 17 GRAM(S): 17 POWDER, FOR SOLUTION ORAL at 22:40

## 2017-10-26 RX ADMIN — LOSARTAN POTASSIUM 25 MILLIGRAM(S): 100 TABLET, FILM COATED ORAL at 05:14

## 2017-10-26 RX ADMIN — HEPARIN SODIUM 5000 UNIT(S): 5000 INJECTION INTRAVENOUS; SUBCUTANEOUS at 22:40

## 2017-10-26 RX ADMIN — Medication 1 DROP(S): at 19:08

## 2017-10-26 RX ADMIN — SENNA PLUS 2 TABLET(S): 8.6 TABLET ORAL at 22:40

## 2017-10-26 RX ADMIN — Medication 3 MILLILITER(S): at 14:13

## 2017-10-26 RX ADMIN — HEPARIN SODIUM 5000 UNIT(S): 5000 INJECTION INTRAVENOUS; SUBCUTANEOUS at 05:15

## 2017-10-26 RX ADMIN — Medication 40 MILLIGRAM(S): at 19:08

## 2017-10-26 RX ADMIN — NYSTATIN CREAM 1 APPLICATION(S): 100000 CREAM TOPICAL at 05:15

## 2017-10-26 NOTE — PROGRESS NOTE ADULT - PROBLEM SELECTOR PLAN 7
Bibasilar crackles  Reviewed ECHO HF pEF  DC HCTZ  Initiated Lasix 40 mg PO daily  Collaborated with Dr. Garcia, attending  Give Lasix 40 mg PO daily

## 2017-10-26 NOTE — PROGRESS NOTE ADULT - SUBJECTIVE AND OBJECTIVE BOX
HPI:  86yo F with pmhx of legal blindness, HTN, and ?prediabetes came in with c/o fever since the past 2-3 days. She said this morning she also felt weak so her family bought her in. She is also SOB. Denies chest pain, abdominal pain, or dysuria. Denies nausea or vomiting. She c/o chronic b/l knee pain due to arthritis. Also states having pain in small ulcer below her left arm along with itching since the past couple of days. Denies cough, but does have phlegm production. No other complaints at this time    Patient is febrile upto 102 in the ED with lactate of 2.1. No leukocytosis or left shift. UA: dirty. CXR- PNA.    Patient is mostly bedbound. Ambulates rarely with walker due to chronic knee pain. Lives with family at home. Family also serves as HHA. (21 Oct 2017 19:30)      INTERVAL HPI/OVERNIGHT EVENTS: ***  PAST MEDICAL & SURGICAL HISTORY:  Legally blind  Arthritis  History of hypertension        Antimicrobial:  levoFLOXacin  Tablet 750 milliGRAM(s) Oral every 24 hours    Cardiovascular:  furosemide    Tablet 40 milliGRAM(s) Oral daily  losartan 25 milliGRAM(s) Oral daily    Pulmonary:  ALBUTerol/ipratropium for Nebulization 3 milliLiter(s) Nebulizer every 6 hours    Hematalogic:  heparin  Injectable 5000 Unit(s) SubCutaneous every 8 hours    Other:  acetaminophen   Tablet. 650 milliGRAM(s) Oral every 6 hours PRN  acetaminophen  Suppository 650 milliGRAM(s) Rectal every 6 hours PRN  artificial  tears Solution 1 Drop(s) Both EYES two times a day  hydrocortisone 1% Ointment 1 Application(s) Topical two times a day  nystatin Powder 1 Application(s) Topical three times a day  pantoprazole    Tablet 40 milliGRAM(s) Oral before breakfast  polyethylene glycol 3350 17 Gram(s) Oral at bedtime  senna 2 Tablet(s) Oral at bedtime    acetaminophen   Tablet. 650 milliGRAM(s) Oral every 6 hours PRN  acetaminophen  Suppository 650 milliGRAM(s) Rectal every 6 hours PRN  ALBUTerol/ipratropium for Nebulization 3 milliLiter(s) Nebulizer every 6 hours  artificial  tears Solution 1 Drop(s) Both EYES two times a day  furosemide    Tablet 40 milliGRAM(s) Oral daily  heparin  Injectable 5000 Unit(s) SubCutaneous every 8 hours  hydrocortisone 1% Ointment 1 Application(s) Topical two times a day  levoFLOXacin  Tablet 750 milliGRAM(s) Oral every 24 hours  losartan 25 milliGRAM(s) Oral daily  nystatin Powder 1 Application(s) Topical three times a day  pantoprazole    Tablet 40 milliGRAM(s) Oral before breakfast  polyethylene glycol 3350 17 Gram(s) Oral at bedtime  senna 2 Tablet(s) Oral at bedtime    Drug Dosing Weight  Height (cm): 175.26 (21 Oct 2017 14:24)  Weight (kg): 124.7 (21 Oct 2017 14:24)  BMI (kg/m2): 40.6 (21 Oct 2017 14:24)  BSA (m2): 2.36 (21 Oct 2017 14:24)      PMH -reviewed admission note, no change since admission  Heart faliure: acute [ ] chronic [ ] acute or chronic [ ] diastolic [ ] systolic [ ] combied systolic and diastolic[ ]  JACOBO: ATN[ ] renal medullary necrosis [ ] CKD I [ ]CKDII [ ]CKD III [ ]CKD IV [ ]CKD V [ ]Other pathological lesions [ ]  Abdominal Nutrition Status: malnutrition [ ] cachexia [ ] morbid obesity/BMI=40 [ ] Supplement ordered [___________]       Vital Signs Last 24 Hrs  T(C): 37.1 (26 Oct 2017 14:26), Max: 37.1 (26 Oct 2017 14:26)  T(F): 98.7 (26 Oct 2017 14:26), Max: 98.7 (26 Oct 2017 14:26)  HR: 76 (26 Oct 2017 14:26) (61 - 76)  BP: 155/68 (26 Oct 2017 14:26) (128/70 - 180/69)  BP(mean): 117 (25 Oct 2017 22:00) (117 - 117)  RR: 16 (26 Oct 2017 14:26) (16 - 23)  SpO2: 97% (26 Oct 2017 14:26) (97% - 100%)    10-25 @ 07:01  -  10-26 @ 07:00  --------------------------------------------------------  IN: 925 mL / OUT: 300 mL / NET: 625 mL          PHYSICAL EXAM:    GENERAL: [ ]NAD, [ ]well-groomed, [ ]well-developed  HEAD:  [ ]Atraumatic, [ ]Normocephalic  EYES: [ ]EOMI, [ ]PERRLA, [ ]conjunctiva and sclera clear  ENMT: [ ]No tonsillar erythema, exudates, or enlargement; [ ]Moist mucous membranes, [ ]Good dentition, [ ]No lesions  NECK: [ ]Supple, normal appearance, [ ]No JVD; [ ]Normal thyroid; [ ]Trachea midline  NERVOUS SYSTEM:  [ ]Alert & Oriented X3, [ ]Good concentration; [ ]Motor Strength 5/5 B/L upper and lower extremities; [ ]DTRs 2+ intact and symmetric  CHEST/LUNG: [ ]No chest deformity; [ ]Normal percussion bilaterally; [ ]No rales, rhonchi, wheezing   HEART: [ ]Regular rate and rhythm; [ ]No murmurs, rubs, or gallops  ABDOMEN: [ ]Soft, Nontender, Nondistended; [ ]Bowel sounds present  EXTREMITIES:  [ ]2+ Peripheral Pulses, [ ]No clubbing, cyanosis, or edema  LYMPH: [ ]No lymphadenopathy noted  SKIN: [ ]No rashes or lesions; [ ]Good capillary refill      LABS:  CBC Full  -  ( 25 Oct 2017 05:57 )  WBC Count : 5.7 K/uL  Hemoglobin : 10.9 g/dL  Hematocrit : 34.5 %  Platelet Count - Automated : 272 K/uL  Mean Cell Volume : 94.4 fl  Mean Cell Hemoglobin : 29.8 pg  Mean Cell Hemoglobin Concentration : 31.6 gm/dL  Auto Neutrophil # : 2.7 K/uL  Auto Lymphocyte # : 1.9 K/uL  Auto Monocyte # : 0.4 K/uL  Auto Eosinophil # : 0.6 K/uL  Auto Basophil # : 0.1 K/uL  Auto Neutrophil % : 46.7 %  Auto Lymphocyte % : 33.9 %  Auto Monocyte % : 7.1 %  Auto Eosinophil % : 11.0 %  Auto Basophil % : 1.4 %    10-25    139  |  103  |  10  ----------------------------<  118<H>  3.9   |  30  |  0.56    Ca    8.6      25 Oct 2017 05:57  Phos  2.6     10-25  Mg     1.9     10-25    TPro  6.4  /  Alb  2.6<L>  /  TBili  0.8  /  DBili  x   /  AST  71<H>  /  ALT  72<H>  /  AlkPhos  130<H>  10-25          86yo female from home, walks with walker, lives with daughter and granddaughter, being cared for by family, PMH HTN, 'pre-diabetes,' right knee OA, legally blind 2/2 glaucoma/cataracts brought to ED by family 2/2 1-2 days of weakness, subjective fever, chills.    Patient presents with sepsis likely 2/2 right multifocal pneumonia, legionella a concern given 'normal WBC' with bandemia and hyponatremia and elevated LFT.      Problem/Recommendation - 1:  Problem S/P: Acute respiratory failure with hypoxia. Recommendation: 2/2 right multifocal pneumonia, severe, requiring mechanical ventilation, extubated on medical floor now       Problem/Recommendation - 2:  ·  Problem: Sepsis, due to unspecified organism.  Recommendation: 2/3 qsofa criteria met (change in mental status, tachypnea) 2/2 right multifocal pneumonia and UTI  continue antibx       Problem/Recommendation - 3:  ·  Problem: Urinary tract infection without hematuria, site unspecified.  Recommendation: likely contributing to sepsis  -on levaquin       Problem/Recommendation - 7:  Problem: History of hypertension. Recommendation:   resumed losartan, monitoring BP HPI:  87yo F with pmhx of legal blindness, HTN, and ?prediabetes came in with c/o fever since the past 2-3 days. She said this morning she also felt weak so her family bought her in. She is also SOB. Denies chest pain, abdominal pain, or dysuria. Denies nausea or vomiting. She c/o chronic b/l knee pain due to arthritis. Also states having pain in small ulcer below her left arm along with itching since the past couple of days. Denies cough, but does have phlegm production. No other complaints at this time    Patient is febrile upto 102 in the ED with lactate of 2.1. No leukocytosis or left shift. UA: dirty. CXR- PNA.    Patient is mostly bedbound. Ambulates rarely with walker due to chronic knee pain. Lives with family at home. Family also serves as HHA. (21 Oct 2017 19:30)      INTERVAL HPI/OVERNIGHT EVENTS: ***  PAST MEDICAL & SURGICAL HISTORY:  Legally blind  Arthritis  History of hypertension        Antimicrobial:  levoFLOXacin  Tablet 750 milliGRAM(s) Oral every 24 hours    Cardiovascular:  furosemide    Tablet 40 milliGRAM(s) Oral daily  losartan 25 milliGRAM(s) Oral daily    Pulmonary:  ALBUTerol/ipratropium for Nebulization 3 milliLiter(s) Nebulizer every 6 hours    Hematalogic:  heparin  Injectable 5000 Unit(s) SubCutaneous every 8 hours    Other:  acetaminophen   Tablet. 650 milliGRAM(s) Oral every 6 hours PRN  acetaminophen  Suppository 650 milliGRAM(s) Rectal every 6 hours PRN  artificial  tears Solution 1 Drop(s) Both EYES two times a day  hydrocortisone 1% Ointment 1 Application(s) Topical two times a day  nystatin Powder 1 Application(s) Topical three times a day  pantoprazole    Tablet 40 milliGRAM(s) Oral before breakfast  polyethylene glycol 3350 17 Gram(s) Oral at bedtime  senna 2 Tablet(s) Oral at bedtime    acetaminophen   Tablet. 650 milliGRAM(s) Oral every 6 hours PRN  acetaminophen  Suppository 650 milliGRAM(s) Rectal every 6 hours PRN  ALBUTerol/ipratropium for Nebulization 3 milliLiter(s) Nebulizer every 6 hours  artificial  tears Solution 1 Drop(s) Both EYES two times a day  furosemide    Tablet 40 milliGRAM(s) Oral daily  heparin  Injectable 5000 Unit(s) SubCutaneous every 8 hours  hydrocortisone 1% Ointment 1 Application(s) Topical two times a day  levoFLOXacin  Tablet 750 milliGRAM(s) Oral every 24 hours  losartan 25 milliGRAM(s) Oral daily  nystatin Powder 1 Application(s) Topical three times a day  pantoprazole    Tablet 40 milliGRAM(s) Oral before breakfast  polyethylene glycol 3350 17 Gram(s) Oral at bedtime  senna 2 Tablet(s) Oral at bedtime    Drug Dosing Weight  Height (cm): 175.26 (21 Oct 2017 14:24)  Weight (kg): 124.7 (21 Oct 2017 14:24)  BMI (kg/m2): 40.6 (21 Oct 2017 14:24)  BSA (m2): 2.36 (21 Oct 2017 14:24)      PMH -reviewed admission note, no change since admission  Heart faliure: acute [ ] chronic [ ] acute or chronic [ ] diastolic [ ] systolic [ ] combied systolic and diastolic[ ]  JACOBO: ATN[ ] renal medullary necrosis [ ] CKD I [ ]CKDII [ ]CKD III [ ]CKD IV [ ]CKD V [ ]Other pathological lesions [ ]  Abdominal Nutrition Status: malnutrition [ ] cachexia [ ] morbid obesity/BMI=40 [ ] Supplement ordered [___________]       Vital Signs Last 24 Hrs  T(C): 37.1 (26 Oct 2017 14:26), Max: 37.1 (26 Oct 2017 14:26)  T(F): 98.7 (26 Oct 2017 14:26), Max: 98.7 (26 Oct 2017 14:26)  HR: 76 (26 Oct 2017 14:26) (61 - 76)  BP: 155/68 (26 Oct 2017 14:26) (128/70 - 180/69)  BP(mean): 117 (25 Oct 2017 22:00) (117 - 117)  RR: 16 (26 Oct 2017 14:26) (16 - 23)  SpO2: 97% (26 Oct 2017 14:26) (97% - 100%)    10-25 @ 07:01  -  10-26 @ 07:00  --------------------------------------------------------  IN: 925 mL / OUT: 300 mL / NET: 625 mL          PHYSICAL EXAM:    GENERAL: [ ]NAD, [ ]well-groomed, [ ]well-developed  HEAD:  [ ]Atraumatic, [ ]Normocephalic  EYES: [ ]EOMI, [ ]PERRLA, [ ]conjunctiva and sclera clear  ENMT: [ ]No tonsillar erythema, exudates, or enlargement; [ ]Moist mucous membranes, [ ]Good dentition, [ ]No lesions  NECK: [ ]Supple, normal appearance, [ ]No JVD; [ ]Normal thyroid; [ ]Trachea midline  NERVOUS SYSTEM:  [ ]Alert & Oriented X3, [ ]Good concentration; [ ]Motor Strength 5/5 B/L upper and lower extremities; [ ]DTRs 2+ intact and symmetric  CHEST/LUNG: [ ]No chest deformity; [ ]Normal percussion bilaterally; [ ]No rales, rhonchi, wheezing   HEART: [ ]Regular rate and rhythm; [ ]No murmurs, rubs, or gallops  ABDOMEN: [ ]Soft, Nontender, Nondistended; [ ]Bowel sounds present  EXTREMITIES:  [ ]2+ Peripheral Pulses, [ ]No clubbing, cyanosis, or edema  LYMPH: [ ]No lymphadenopathy noted  SKIN: [ ]No rashes or lesions; [ ]Good capillary refill      LABS:  CBC Full  -  ( 25 Oct 2017 05:57 )  WBC Count : 5.7 K/uL  Hemoglobin : 10.9 g/dL  Hematocrit : 34.5 %  Platelet Count - Automated : 272 K/uL  Mean Cell Volume : 94.4 fl  Mean Cell Hemoglobin : 29.8 pg  Mean Cell Hemoglobin Concentration : 31.6 gm/dL  Auto Neutrophil # : 2.7 K/uL  Auto Lymphocyte # : 1.9 K/uL  Auto Monocyte # : 0.4 K/uL  Auto Eosinophil # : 0.6 K/uL  Auto Basophil # : 0.1 K/uL  Auto Neutrophil % : 46.7 %  Auto Lymphocyte % : 33.9 %  Auto Monocyte % : 7.1 %  Auto Eosinophil % : 11.0 %  Auto Basophil % : 1.4 %    10-25    139  |  103  |  10  ----------------------------<  118<H>  3.9   |  30  |  0.56    Ca    8.6      25 Oct 2017 05:57  Phos  2.6     10-25  Mg     1.9     10-25    TPro  6.4  /  Alb  2.6<L>  /  TBili  0.8  /  DBili  x   /  AST  71<H>  /  ALT  72<H>  /  AlkPhos  130<H>  10-25          87yo female from home, walks with walker, lives with daughter and granddaughter, being cared for by family, PMH HTN, 'pre-diabetes,' right knee OA, legally blind 2/2 glaucoma/cataracts brought to ED by family 2/2 1-2 days of weakness, subjective fever, chills.    Patient presents with sepsis likely 2/2 right multifocal pneumonia, legionella a concern given 'normal WBC' with bandemia and hyponatremia and elevated LFT.      Problem/Recommendation - 1:  Problem S/P: Acute respiratory failure with hypoxia. Recommendation: 2/2 right multifocal pneumonia, severe, requiring mechanical ventilation, extubated on medical floor now       Problem/Recommendation - 2:  ·  Problem: Sepsis, due to unspecified organism.  Recommendation: 2/3 qsofa criteria met (change in mental status, tachypnea) 2/2 right multifocal pneumonia and UTI  continue antibx       Problem/Recommendation - 3:  ·  Problem: Urinary tract infection without hematuria, site unspecified.  Recommendation: likely contributing to sepsis  -on levaquin       Problem/Recommendation - 7:  Problem: History of hypertension. Recommendation:   resumed losartan, monitoring BP

## 2017-10-26 NOTE — PROGRESS NOTE ADULT - PROBLEM SELECTOR PROBLEM 2
Urinary tract infection without hematuria, site unspecified
Pneumonia, unspecified organism

## 2017-10-26 NOTE — PROGRESS NOTE ADULT - ATTENDING COMMENTS
Agree with above assessment and plan as outlined above.    - Cont supportive care per med    Tulio Mensah MD, FACC  [facility], [facility]  [address] [place] [address]  PHONE:  [phone]  BEEPER : [phone]

## 2017-10-26 NOTE — PROGRESS NOTE ADULT - SUBJECTIVE AND OBJECTIVE BOX
Patient denies CP, SOB , no events overnight     acetaminophen   Tablet. 650 milliGRAM(s) Oral every 6 hours PRN  acetaminophen  Suppository 650 milliGRAM(s) Rectal every 6 hours PRN  ALBUTerol/ipratropium for Nebulization 3 milliLiter(s) Nebulizer every 6 hours  artificial  tears Solution 1 Drop(s) Both EYES two times a day  heparin  Injectable 5000 Unit(s) SubCutaneous every 8 hours  hydrochlorothiazide 12.5 milliGRAM(s) Oral daily  levoFLOXacin  Tablet 750 milliGRAM(s) Oral every 24 hours  losartan 25 milliGRAM(s) Oral daily  nystatin Powder 1 Application(s) Topical three times a day  pantoprazole    Tablet 40 milliGRAM(s) Oral before breakfast  polyethylene glycol 3350 17 Gram(s) Oral at bedtime  senna 2 Tablet(s) Oral at bedtime                            10.9   5.7   )-----------( 272      ( 25 Oct 2017 05:57 )             34.5       Hemoglobin: 10.9 g/dL (10-25 @ 05:57)  Hemoglobin: 11.2 g/dL (10-24 @ 06:34)  Hemoglobin: 10.8 g/dL (10-23 @ 06:35)  Hemoglobin: 11.6 g/dL (10-22 @ 03:48)  Hemoglobin: 13.0 g/dL (10-22 @ 00:29)      10-25    139  |  103  |  10  ----------------------------<  118<H>  3.9   |  30  |  0.56    Ca    8.6      25 Oct 2017 05:57  Phos  2.6     10-25  Mg     1.9     10-25    TPro  6.4  /  Alb  2.6<L>  /  TBili  0.8  /  DBili  x   /  AST  71<H>  /  ALT  72<H>  /  AlkPhos  130<H>  10-25    Creatinine Trend: 0.56<--, 0.66<--, 0.60<--, 0.81<--, 0.87<--, 0.97<--    COAGS:           T(C): 36.9 (10-26-17 @ 04:59), Max: 37.1 (10-25-17 @ 15:00)  HR: 62 (10-26-17 @ 04:59) (57 - 77)  BP: 180/69 (10-26-17 @ 04:59) (91/74 - 180/69)  RR: 17 (10-26-17 @ 04:59) (14 - 23)  SpO2: 100% (10-26-17 @ 04:59) (90% - 100%)  Wt(kg): --    I&O's Summary    24 Oct 2017 07:01  -  25 Oct 2017 07:00  --------------------------------------------------------  IN: 120 mL / OUT: 350 mL / NET: -230 mL    25 Oct 2017 07:01  -  26 Oct 2017 06:22  --------------------------------------------------------  IN: 925 mL / OUT: 300 mL / NET: 625 mL        HEENT:   Normal oral mucosa, PERRL, EOMI	  Lymphatic: No obvious lymphadenopathy , no edema  Cardiovascular: Normal S1 S2, No JVD, 1/6 TAI murmur, Peripheral pulses palpable 2+ bilaterally  Respiratory: Lungs clear to auscultation, normal effort 	  Gastrointestinal:  Soft, Non-tender, + BS	    TELEMETRY: 	  Sinus  echo   < from: Transthoracic Echocardiogram (10.22.17 @ 07:56) >  ---------------------------  CONCLUSIONS:  1. Normal mitral valve. Mild mitral regurgitation.  2. Normal trileaflet aortic valve. Mild aortic  regurgitation.  3. Aortic Root: 3.8 cm.  4. Normal left atrium.  5. Normal left ventricular internal dimensions and wall  thicknesses.  6. Normal Left Ventricular Systolic Function,  (EF = 55 to  60%)  7. Grade II diastolic dysfunction.  8. Normal right atrium.  9. Normal right ventricular size and function.  10. There is mild tricuspid regurgitation.  11. Normal pericardium with no pericardial effusion.    < end of copied text >    ASSESSMENT/PLAN: 	86y Female mostly bedbound HTN, and ?prediabetes came in with c/o fever since the past 2-3 days. found with sepsis due to UTI and PNA (+) Trop. Normal LV and RV function on echo.    - Aggressive chest pt   - Asa , statin, ACE , hctz   - suspect demand ischemia  - Abx per medicine   -  GI / DVT prophylaxis.  - keep K>4, mag >2.0    no s/s of chf on exam   D/W Dr Mensah

## 2017-10-26 NOTE — PROGRESS NOTE ADULT - PROBLEM SELECTOR PLAN 1
Opacity resolved per CXR today  Continue Levaquin 500 mg dialy  Obtain antibiotic course from Dr. Olsen, ID - Dr. Garcia to follow

## 2017-10-26 NOTE — PROGRESS NOTE ADULT - PROBLEM SELECTOR PROBLEM 6
Need for prophylactic measure
History of hypertension
Need for prophylactic measure

## 2017-10-26 NOTE — PROGRESS NOTE ADULT - ASSESSMENT
84yo F with pmhx of legal blindness, HTN, and ?prediabetes came in with c/o fever since the past 2-3 days and looking increasingly weak on day of admission. Pt was initially given ceftriaxone and azithro for covering CAP and UTI as CXR showed right sided infiltrate and u/a was positive.   pt was code sepsis in ER. she had WBC 4k with 13% bands, elevated transaminases and mildly elevated lactate. Patient had RR on floor for respiratory distress and AMS> she had hypoxia to 75% and was intubated by anesthesia and now in ICU.   ABX was broadened to zosyn and levofloxacin    Impression:   1. right sided pneumonia, community acquired. CXR from yesterday improved. Blood culture and urine legionella ag negative. sputum culture no growth.   2. acute hypoxic respiratory failure sec to pneumonia  3. positive urinalysis: asymptomatic, u/a collection(from bedpan) unlikley clean catch. growing e coli, quinolone sensitive.     Plan:  ·	PO levofloxacin to complete 7 days of rx.      ·	follow up blood cultures and urine cultures  ·	follow up urine legionella ag and send pneumococcal ag  ·	if any ET tube secretions send it for gram stain and culture.   ·	continue Zosyn and levofloxacin for now  ·	ICU care.  d/w team.   [facility] 85yo F with pmhx of legal blindness, HTN, and ?prediabetes came in with c/o fever since the past 2-3 days and looking increasingly weak on day of admission. Pt was initially given ceftriaxone and azithro for covering CAP and UTI as CXR showed right sided infiltrate and u/a was positive.   pt was code sepsis in ER. she had WBC 4k with 13% bands, elevated transaminases and mildly elevated lactate. Patient had RR on floor for respiratory distress and AMS> she had hypoxia to 75% and was intubated by anesthesia and now in ICU.   ABX was broadened to zosyn and levofloxacin    Impression:   1. right sided pneumonia, community acquired. CXR from yesterday improved. Blood culture and urine legionella ag negative. sputum culture no growth.   2. acute hypoxic respiratory failure sec to pneumonia  3. positive urinalysis: asymptomatic, u/a collection(from bedpan) unlikley clean catch. growing e coli, quinolone sensitive.     Plan:  ·	PO levofloxacin to complete 7 days of rx.      ·	follow up blood cultures and urine cultures  ·	follow up urine legionella ag and send pneumococcal ag  ·	if any ET tube secretions send it for gram stain and culture.   ·	continue Zosyn and levofloxacin for now  ·	ICU care.  d/w team.   [facility]

## 2017-10-26 NOTE — PROGRESS NOTE ADULT - SUBJECTIVE AND OBJECTIVE BOX
Patient seen and examined.   afebrile.   no acute distress.   no new symptoms.    MEDICATIONS  (STANDING):  ALBUTerol/ipratropium for Nebulization 3 milliLiter(s) Nebulizer every 6 hours  artificial  tears Solution 1 Drop(s) Both EYES two times a day  heparin  Injectable 5000 Unit(s) SubCutaneous every 8 hours  hydrochlorothiazide 12.5 milliGRAM(s) Oral daily  levoFLOXacin  Tablet 750 milliGRAM(s) Oral every 24 hours  losartan 25 milliGRAM(s) Oral daily  nystatin Powder 1 Application(s) Topical three times a day  pantoprazole    Tablet 40 milliGRAM(s) Oral before breakfast  polyethylene glycol 3350 17 Gram(s) Oral at bedtime  senna 2 Tablet(s) Oral at bedtime      MEDICATIONS  (PRN):  acetaminophen   Tablet. 650 milliGRAM(s) Oral every 6 hours PRN Mild Pain (1 - 3)  acetaminophen  Suppository 650 milliGRAM(s) Rectal every 6 hours PRN For Temp greater than 38 C (100.4 F)       Medications up to date at time of exam.      PHYSICAL EXAMINATION:      Vital Signs Last 24 Hrs  T(C): 36.6 (26 Oct 2017 08:30), Max: 37.1 (25 Oct 2017 15:00)  T(F): 97.9 (26 Oct 2017 08:30), Max: 98.7 (25 Oct 2017 15:00)  HR: 76 (26 Oct 2017 08:30) (61 - 77)  BP: 128/70 (26 Oct 2017 08:30) (91/74 - 180/69)  BP(mean): 117 (25 Oct 2017 22:00) (57 - 117)  RR: 18 (26 Oct 2017 08:30) (14 - 23)  SpO2: 97% (26 Oct 2017 08:30) (90% - 100%)   (if applicable)    GENERAL: The patient is a well-developed, well-nourished, in no apparent distress.     HEENT: orally intubated.     NECK: Supple, no palpable adenopathy.    LUNGS: BLAE +, basal crepts +. .    HEART: S1S2 normal.     ABDOMEN: Soft, nontender, and nondistended.  No hepatosplenomegaly is noted.    EXTREMITIES: Without any cyanosis, clubbing, rash, lesions or edema.    NEUROLOGIC: Awake, alert, responsive.    SKIN:   No new change noticed.      LABS:                        10.9   5.7   )-----------( 272      ( 25 Oct 2017 05:57 )             34.5     10-25    139  |  103  |  10  ----------------------------<  118<H>  3.9   |  30  |  0.56    Ca    8.6      25 Oct 2017 05:57  Phos  2.6     10-25  Mg     1.9     10-25    TPro  6.4  /  Alb  2.6<L>  /  TBili  0.8  /  DBili  x   /  AST  71<H>  /  ALT  72<H>  /  AlkPhos  130<H>  10-25      Culture - Urine (10.21.17 @ 23:33)    -  Ampicillin/Sulbactam: R >16/8    -  Aztreonam: S <=4    -  Cefazolin: I 16    -  Cefepime: S <=2    -  Cefoxitin: S <=4    -  Ceftazidime: S <=1    -  Ceftriaxone: S <=1    -  Ciprofloxacin: S <=0.5    -  Ertapenem: S <=0.5    -  Gentamicin: R >8    -  Imipenem: S <=1    -  Levofloxacin: S <=1    -  Meropenem: S <=1    -  Nitrofurantoin: S <=32    -  Piperacillin/Tazobactam: S <=8    -  Tobramycin: S 4    -  Trimethoprim/Sulfamethoxazole: R >2/38    -  Amikacin: S <=8    -  Ampicillin: R >16    Specimen Source: .Urine Catheterized    Culture Results:   10,000 - 49,000 CFU/mL Escherichia coli    Organism Identification: Escherichia coli    Organism: Escherichia coli    Method Type: NIDIA                    Culture Results:   No growth at 48 hours (10-22-17 @ 16:26)  Culture Results:   No growth to date. (10-22-17 @ 00:50)  Culture Results:   No growth to date. (10-22-17 @ 00:41)  Culture Results:   10,000 - 49,000 CFU/mL Escherichia coli (10-21-17 @ 23:33)  culture  MICROBIOLOGY: (if applicable)    RADIOLOGY & ADDITIONAL STUDIES:  EKG:   CXR:< from: Xray Chest 1 View AP -PORTABLE-Routine (10.26.17 @ 09:51) >  IMPRESSION:  Mild diffuse interstitial prominence, similar to the prior study. Right   lung opacities are essentially resolved.      < end of copied text >    ECHO:< from: Transthoracic Echocardiogram (10.22.17 @ 07:56) >  CONCLUSIONS:  1. Normal mitral valve. Mild mitral regurgitation.  2. Normal trileaflet aortic valve. Mild aortic  regurgitation.  3. Aortic Root: 3.8 cm.  4. Normal left atrium.  5. Normal left ventricular internal dimensions and wall  thicknesses.  6. Normal Left Ventricular Systolic Function,  (EF = 55 to  60%)  7. Grade II diastolic dysfunction.  8. Normal right atrium.  9. Normal right ventricular size and function.  10. There is mild tricuspid regurgitation.  11. Normal pericardium with no pericardial effusion.    < end of copied text >        RECOMMENDATIONS:

## 2017-10-26 NOTE — PROGRESS NOTE ADULT - SUBJECTIVE AND OBJECTIVE BOX
NP Note discussed with  Primary Attending    Patient is a 86y old  Female who presents with a chief complaint of Fever (21 Oct 2017 19:30). ICU downgrade s/p acute respiratory failure and sepsis secondary to right multifocal pneumonia.  Also urine culture <50K Ecoli.  Levaquin 500 mg tablet daily continues s/p IV Zosyn and Vancomycin.  VSS. Dr. Olsen, ID consulting.      INTERVAL HPI/OVERNIGHT EVENTS: Rash right lateral buttocks - occasion pruritus    MEDICATIONS  (STANDING):  ALBUTerol/ipratropium for Nebulization 3 milliLiter(s) Nebulizer every 6 hours  artificial  tears Solution 1 Drop(s) Both EYES two times a day  furosemide    Tablet 40 milliGRAM(s) Oral daily  heparin  Injectable 5000 Unit(s) SubCutaneous every 8 hours  hydrocortisone 1% Ointment 1 Application(s) Topical two times a day  levoFLOXacin  Tablet 750 milliGRAM(s) Oral every 24 hours  losartan 25 milliGRAM(s) Oral daily  nystatin Powder 1 Application(s) Topical three times a day  pantoprazole    Tablet 40 milliGRAM(s) Oral before breakfast  polyethylene glycol 3350 17 Gram(s) Oral at bedtime  senna 2 Tablet(s) Oral at bedtime    MEDICATIONS  (PRN):  acetaminophen   Tablet. 650 milliGRAM(s) Oral every 6 hours PRN Mild Pain (1 - 3)  acetaminophen  Suppository 650 milliGRAM(s) Rectal every 6 hours PRN For Temp greater than 38 C (100.4 F)  __________________________________________________  REVIEW OF SYSTEMS:    CONSTITUTIONAL: No fever,   EYES: no acute visual disturbances  NECK: No pain or stiffness  RESPIRATORY: No cough; No shortness of breath  CARDIOVASCULAR: No chest pain, no palpitations  GASTROINTESTINAL: No pain. No nausea or vomiting; No diarrhea   NEUROLOGICAL: No headache or numbness, no tremors  MUSCULOSKELETAL: No joint pain, no muscle pain  GENITOURINARY: no dysuria, no frequency, no hesitancy  PSYCHIATRY: no depression , no anxiety  Skin: Rash right lateral buttocks      Vital Signs Last 24 Hrs  T(C): 37.1 (26 Oct 2017 14:26), Max: 37.1 (26 Oct 2017 14:26)  T(F): 98.7 (26 Oct 2017 14:26), Max: 98.7 (26 Oct 2017 14:26)  HR: 76 (26 Oct 2017 14:26) (61 - 77)  BP: 155/68 (26 Oct 2017 14:26) (91/74 - 180/69)  BP(mean): 117 (25 Oct 2017 22:00) (75 - 117)  RR: 16 (26 Oct 2017 14:26) (14 - 23)  SpO2: 97% (26 Oct 2017 14:26) (96% - 100%)    ________________________________________________  PHYSICAL EXAM:  GENERAL: NAD  HEENT: Normocephalic;  conjunctivae and sclerae clear; moist mucous membranes;   NECK : supple  CHEST/LUNG: Bibasilar crackles  HEART: S1 S2  regular; no murmurs, gallops or rubs  ABDOMEN: Soft, Nontender, Nondistended; Bowel sounds present  EXTREMITIES: no cyanosis; no edema; no calf tenderness  SKIN: warm and dry; no rash  NERVOUS SYSTEM:  Awake and alert; Oriented  to place, person and time ; no new deficits    _________________________________________________  LABS:                        10.9   5.7   )-----------( 272      ( 25 Oct 2017 05:57 )             34.5     10-25    139  |  103  |  10  ----------------------------<  118<H>  3.9   |  30  |  0.56    Ca    8.6      25 Oct 2017 05:57  Phos  2.6     10-25  Mg     1.9     10-25    TPro  6.4  /  Alb  2.6<L>  /  TBili  0.8  /  DBili  x   /  AST  71<H>  /  ALT  72<H>  /  AlkPhos  130<H>  10-25    CAPILLARY BLOOD GLUCOSE    RADIOLOGY & ADDITIONAL TESTS:    Imaging Personally Reviewed:  YES 10/26/17 CXR right sided opacity resolved    Consultant(s) Notes Reviewed:   YES Dr. Olsen, ID    Care Discussed with Consultants : No; Dr. Garcia to follow    Plan of care was discussed with patient and /or primary care giver; all questions and concerns were addressed and care was aligned with patient's wishes. NP Note discussed with  Primary Attending    Patient is a 86y old  Female who presents with a chief complaint of Fever (21 Oct 2017 19:30). ICU downgrade s/p acute respiratory failure and sepsis secondary to right multifocal pneumonia.  Also urine culture <50K Ecoli.  Levaquin 500 mg tablet daily continues s/p IV Zosyn and Vancomycin.  VSS. Dr. Olsen, ID consulting.      INTERVAL HPI/OVERNIGHT EVENTS: Rash right lateral buttocks - occasion pruritus, no other complaints    MEDICATIONS  (STANDING):  ALBUTerol/ipratropium for Nebulization 3 milliLiter(s) Nebulizer every 6 hours  artificial  tears Solution 1 Drop(s) Both EYES two times a day  furosemide    Tablet 40 milliGRAM(s) Oral daily  heparin  Injectable 5000 Unit(s) SubCutaneous every 8 hours  hydrocortisone 1% Ointment 1 Application(s) Topical two times a day  levoFLOXacin  Tablet 750 milliGRAM(s) Oral every 24 hours  losartan 25 milliGRAM(s) Oral daily  nystatin Powder 1 Application(s) Topical three times a day  pantoprazole    Tablet 40 milliGRAM(s) Oral before breakfast  polyethylene glycol 3350 17 Gram(s) Oral at bedtime  senna 2 Tablet(s) Oral at bedtime    MEDICATIONS  (PRN):  acetaminophen   Tablet. 650 milliGRAM(s) Oral every 6 hours PRN Mild Pain (1 - 3)  acetaminophen  Suppository 650 milliGRAM(s) Rectal every 6 hours PRN For Temp greater than 38 C (100.4 F)  __________________________________________________  REVIEW OF SYSTEMS:    CONSTITUTIONAL: No fever,   EYES: no acute visual disturbances  NECK: No pain or stiffness  RESPIRATORY: No cough; No shortness of breath  CARDIOVASCULAR: No chest pain, no palpitations  GASTROINTESTINAL: No pain. No nausea or vomiting; No diarrhea   NEUROLOGICAL: No headache or numbness, no tremors  MUSCULOSKELETAL: No joint pain, no muscle pain  GENITOURINARY: no dysuria, no frequency, no hesitancy  PSYCHIATRY: no depression , no anxiety  Skin: Rash right lateral buttocks, no other rash or lesions reported      Vital Signs Last 24 Hrs  T(C): 37.1 (26 Oct 2017 14:26), Max: 37.1 (26 Oct 2017 14:26)  T(F): 98.7 (26 Oct 2017 14:26), Max: 98.7 (26 Oct 2017 14:26)  HR: 76 (26 Oct 2017 14:26) (61 - 77)  BP: 155/68 (26 Oct 2017 14:26) (91/74 - 180/69)  BP(mean): 117 (25 Oct 2017 22:00) (75 - 117)  RR: 16 (26 Oct 2017 14:26) (14 - 23)  SpO2: 97% (26 Oct 2017 14:26) (96% - 100%)    ________________________________________________  PHYSICAL EXAM:  GENERAL: NAD  HEENT: Normocephalic;  conjunctivae and sclerae clear; moist mucous membranes;   NECK : supple  CHEST/LUNG: Bibasilar crackles  HEART: S1 S2  regular; no murmurs, gallops or rubs  ABDOMEN: Soft, Nontender, Nondistended; Bowel sounds present  EXTREMITIES: no cyanosis; no edema; no calf tenderness  SKIN: warm and dry;+ Rash right lateral buttocks  NERVOUS SYSTEM:  Awake and alert; Oriented  to place, person and time ; no new deficits    _________________________________________________  LABS:                        10.9   5.7   )-----------( 272      ( 25 Oct 2017 05:57 )             34.5     10-25    139  |  103  |  10  ----------------------------<  118<H>  3.9   |  30  |  0.56    Ca    8.6      25 Oct 2017 05:57  Phos  2.6     10-25  Mg     1.9     10-25    TPro  6.4  /  Alb  2.6<L>  /  TBili  0.8  /  DBili  x   /  AST  71<H>  /  ALT  72<H>  /  AlkPhos  130<H>  10-25    CAPILLARY BLOOD GLUCOSE    RADIOLOGY & ADDITIONAL TESTS:    Imaging Personally Reviewed:  YES 10/26/17 CXR right sided opacity resolved    Consultant(s) Notes Reviewed:   YES Dr. Olsen, ID    Care Discussed with Consultants : No; Dr. Garcia to follow    Plan of care was discussed with patient and /or primary care giver; all questions and concerns were addressed and care was aligned with patient's wishes.

## 2017-10-26 NOTE — PROGRESS NOTE ADULT - PROBLEM SELECTOR PROBLEM 4
Gastrointestinal hemorrhage, unspecified gastrointestinal hemorrhage type
Contact dermatitis due to oil, unspecified contact dermatitis type
Gastrointestinal hemorrhage, unspecified gastrointestinal hemorrhage type

## 2017-10-26 NOTE — PROGRESS NOTE ADULT - ATTENDING COMMENTS
Patient seen and examined. Patient's history, vitals, labs, imaging studies reviewed. Discussed with above NP, agree with note with edits. Plan of care discussed with patient, and daughter at bedside agrees, all questions answered.   Aimee Garcia MD

## 2017-10-26 NOTE — PROGRESS NOTE ADULT - PROBLEM SELECTOR PLAN 4
No evidence of active GI bleeding at this time. No episodes of melena, hematemesis, hematochezia, relatively stable H/H.   No GI intervention needed at this time needed, as per GI consult  - GI consult Dr. Cedillo   - protonix IV  - monitor cbc
Hydrocortisone 1% ointment twice daily for 10 days
- bright red blood via NGT 2/2 ? traumatic intubation, possibly UGIB  - protonix IV gtt  - monitor serial cbc  - GI consult Dr. Cedillo  on board
No evidence of active GI bleeding at this time. No episodes of melena, hematemesis, hematochezia, relatively stable H/H.   No GI intervention needed at this time needed, as per GI consult  - GI consult Dr. Cedillo  on board  - protonix IV  - monitor cbc
No evidence of active GI bleeding at this time. No episodes of melena, hematemesis, hematochezia, relatively stable H/H.   No GI intervention needed at this time needed, as per GI consult  - GI consult Dr. Cedillo  on board  - protonix IV  - monitor cbc

## 2017-10-26 NOTE — PROGRESS NOTE ADULT - PROBLEM SELECTOR PLAN 5
possibly 2/2 legionella pneumonia, not obstructive picture  possibly 2/2 sepsis,   - monitor.
Family requests wheelchair  Will consult with   Resume HHA when appropriate
possibly 2/2 legionella pneumonia, not obstructive picture  possibly 2/2 sepsis,   - follow up viral hepatitis panel  - monitor.
possibly 2/2 legionella pneumonia, not obstructive picture  possibly 2/2 sepsis,   -viral hepatitis panel  - monitor.
possibly 2/2 legionella pneumonia, not obstructive picture  possibly 2/2 sepsis,   -viral hepatitis panel  - monitor.

## 2017-10-26 NOTE — PROGRESS NOTE ADULT - PROBLEM SELECTOR PROBLEM 1
Pneumonia, unspecified organism
Acute respiratory failure with hypoxia
Gastrointestinal hemorrhage, unspecified gastrointestinal hemorrhage type
Acute respiratory failure with hypoxia

## 2017-10-26 NOTE — PROGRESS NOTE ADULT - PROBLEM SELECTOR PROBLEM 3
Urinary tract infection without hematuria, site unspecified
Transaminitis
Urinary tract infection without hematuria, site unspecified

## 2017-10-27 ENCOUNTER — TRANSCRIPTION ENCOUNTER (OUTPATIENT)
Age: 82
End: 2017-10-27

## 2017-10-27 VITALS
SYSTOLIC BLOOD PRESSURE: 127 MMHG | DIASTOLIC BLOOD PRESSURE: 65 MMHG | OXYGEN SATURATION: 94 % | RESPIRATION RATE: 16 BRPM | HEART RATE: 78 BPM | TEMPERATURE: 99 F

## 2017-10-27 LAB
CULTURE RESULTS: SIGNIFICANT CHANGE UP
CULTURE RESULTS: SIGNIFICANT CHANGE UP
SPECIMEN SOURCE: SIGNIFICANT CHANGE UP
SPECIMEN SOURCE: SIGNIFICANT CHANGE UP

## 2017-10-27 RX ORDER — CIPROFLOXACIN LACTATE 400MG/40ML
1 VIAL (ML) INTRAVENOUS
Qty: 2 | Refills: 0
Start: 2017-10-27 | End: 2017-10-29

## 2017-10-27 RX ORDER — CIPROFLOXACIN LACTATE 400MG/40ML
1 VIAL (ML) INTRAVENOUS
Qty: 2 | Refills: 0 | OUTPATIENT
Start: 2017-10-27 | End: 2017-10-29

## 2017-10-27 RX ORDER — INFLUENZA VIRUS VACCINE 15; 15; 15; 15 UG/.5ML; UG/.5ML; UG/.5ML; UG/.5ML
0.5 SUSPENSION INTRAMUSCULAR ONCE
Qty: 0 | Refills: 0 | Status: COMPLETED | OUTPATIENT
Start: 2017-10-27 | End: 2017-10-27

## 2017-10-27 RX ORDER — LOSARTAN POTASSIUM 100 MG/1
1 TABLET, FILM COATED ORAL
Qty: 30 | Refills: 0
Start: 2017-10-27 | End: 2017-11-26

## 2017-10-27 RX ORDER — FUROSEMIDE 40 MG
1 TABLET ORAL
Qty: 30 | Refills: 0
Start: 2017-10-27 | End: 2017-11-26

## 2017-10-27 RX ORDER — LOSARTAN/HYDROCHLOROTHIAZIDE 100MG-25MG
0 TABLET ORAL
Qty: 0 | Refills: 0 | COMMUNITY

## 2017-10-27 RX ORDER — FUROSEMIDE 40 MG
1 TABLET ORAL
Qty: 0 | Refills: 0 | COMMUNITY
Start: 2017-10-27

## 2017-10-27 RX ADMIN — PANTOPRAZOLE SODIUM 40 MILLIGRAM(S): 20 TABLET, DELAYED RELEASE ORAL at 06:42

## 2017-10-27 RX ADMIN — Medication 1 DROP(S): at 17:30

## 2017-10-27 RX ADMIN — NYSTATIN CREAM 1 APPLICATION(S): 100000 CREAM TOPICAL at 13:07

## 2017-10-27 RX ADMIN — LOSARTAN POTASSIUM 25 MILLIGRAM(S): 100 TABLET, FILM COATED ORAL at 06:42

## 2017-10-27 RX ADMIN — Medication 1 APPLICATION(S): at 06:42

## 2017-10-27 RX ADMIN — HEPARIN SODIUM 5000 UNIT(S): 5000 INJECTION INTRAVENOUS; SUBCUTANEOUS at 06:42

## 2017-10-27 RX ADMIN — Medication 1 APPLICATION(S): at 17:30

## 2017-10-27 RX ADMIN — Medication 3 MILLILITER(S): at 09:25

## 2017-10-27 RX ADMIN — Medication 1 DROP(S): at 06:42

## 2017-10-27 RX ADMIN — HEPARIN SODIUM 5000 UNIT(S): 5000 INJECTION INTRAVENOUS; SUBCUTANEOUS at 13:07

## 2017-10-27 RX ADMIN — INFLUENZA VIRUS VACCINE 0.5 MILLILITER(S): 15; 15; 15; 15 SUSPENSION INTRAMUSCULAR at 14:11

## 2017-10-27 RX ADMIN — Medication 3 MILLILITER(S): at 14:40

## 2017-10-27 RX ADMIN — Medication 650 MILLIGRAM(S): at 14:16

## 2017-10-27 RX ADMIN — Medication 40 MILLIGRAM(S): at 06:42

## 2017-10-27 RX ADMIN — NYSTATIN CREAM 1 APPLICATION(S): 100000 CREAM TOPICAL at 06:42

## 2017-10-27 NOTE — DISCHARGE NOTE ADULT - SECONDARY DIAGNOSIS.
Sepsis, due to unspecified organism Acute respiratory failure with hypoxia History of hypertension Transaminitis Contact dermatitis, unspecified contact dermatitis type, unspecified trigger

## 2017-10-27 NOTE — DISCHARGE NOTE ADULT - MEDICATION SUMMARY - MEDICATIONS TO TAKE
I will START or STAY ON the medications listed below when I get home from the hospital:    Hyzaar  -- Indication: For History of hypertension    furosemide 40 mg oral tablet  -- 1 tab(s) by mouth once a day  -- Indication: For History of hypertension    levoFLOXacin 750 mg oral tablet  -- 1 tab(s) by mouth every 24 hours  -- Indication: For Pneumonia, unspecified organism I will START or STAY ON the medications listed below when I get home from the hospital:    losartan 25 mg oral tablet  -- 1 tab(s) by mouth once a day  -- Indication: For History of hypertension    furosemide 40 mg oral tablet  -- 1 tab(s) by mouth once a day  -- Indication: For History of hypertension    levoFLOXacin 750 mg oral tablet  -- 1 tab(s) by mouth every 24 hours  -- Indication: For Pneumonia, unspecified organism

## 2017-10-27 NOTE — PROGRESS NOTE ADULT - ATTENDING COMMENTS
Patient seen and examined, agree with above assessment and plan as transcribed above.    - Cont medical management of possible CAD  - Abx per med  - (+) trop likely from sepsis  - Remainder of cardiac w/u will depend on clinical course    Tulio Mensah MD, [facility]  [facility], [facility]  [address] [place] [address]  PHONE:  [phone]  BEEPER : [phone]

## 2017-10-27 NOTE — DISCHARGE NOTE ADULT - HOSPITAL COURSE
Patient is a 86 yo F with PMHX of legal blindness, HTN, and ?prediabetes came in with c/o fever since the past 2-3 days prior to admission. She said she also felt weak so her family bought her in. She was also SOB. Denied chest pain, abdominal pain, or dysuria. Denied nausea or vomiting. She c/o chronic b/l knee pain due to arthritis. Also stated having pain in small ulcer below her left arm along with itching since the past couple of days. Denied cough, but does have phlegm production. No other complaints at this time. Patient was febrile up to 102 in the ED with lactate of 2.1. No leukocytosis or left shift. UA: dirty. CXR- PNA. Patient is mostly bedbound. Ambulates rarely with walker due to chronic knee pain. Lives with family at home. Family also serves as HHA. Admitted for sepsis due to PNA and UTI.     Admitted to ICU for; downgraded to medicine when appropriate:  1) Acute respiratory failure with hypoxia 2/2 right multifocal pneumonia, severe, requiring mechanical ventilation  CURB-65 score = 3 on admission  -extubated on 10/23  -s/p vancomycin 1 gm and zosyn in ed  -c/w Levaquin  -Dr. Olsen- ID  -negative urine legionella, RVP and sputum cx  - f/u strep antigen, procalcitonin  -negative BCx     2) Sepsis  2/3 qsofa criteria met (change in mental status, tachypnea) 2/2 right multifocal pneumonia and UTI  now resolved  lactate normalized after 3700cc fluid  -of fluids  - broad spectrum with zosyn and Levaquin, now only on Levaquin  - long discontinued on 10/24  -UCx <50,000 e.coli, pansensitive  -blood culture neg    3) Urinary tract infection without hematuria, site unspecified.  Recommendation: likely contributing to sepsis  - on zosyn  -<50,000 Ecoli UCx (pan sensitive)     4) Gastrointestinal hemorrhage, unspecified gastrointestinal hemorrhage type.    -bright red blood via NGT 2/2 possibly UGIB  - Protonix gtt switched to po ppi  - GI consult Dr. Cedillo- signed off, patient is stable, H&H stable    5) Transaminitis  possibly 2/2 hypovolemia and sepsis  not obstructive picture  -trending down, continue to trend    6) Hyponatremia  possibly 2/2 legionella pneumonia on admission, urine legionella negative  hypovolemic hyponatremia  -resolved    7) History of hypertension  resumed Losartan, monitoring BP  will start Hydrochlorothiazide  Continued Hyzaar on discharge    8) Need for prophylactic measure  improve score =3, originally held off on DVT ppx due to suspected GI bleed, now on Heparin sq  Protonix for GI ppx Patient is a 84 yo F with PMHX of legal blindness, HTN, and prediabetes came in with c/o fever since the past 2-3 days prior to admission. She said she also felt weak so her family bought her in. She was also SOB. Denied chest pain, abdominal pain, or dysuria. Denied nausea or vomiting. She c/o chronic b/l knee pain due to arthritis. Also stated having pain in small ulcer below her left arm along with itching since the past couple of days. Denied cough, but does have phlegm production. No other complaints at this time. Patient was febrile up to 102F in the ED with lactate of 2.1. No leukocytosis or left shift. UA: dirty. CXR- PNA. Patient is mostly bedbound. Ambulates rarely with walker due to chronic knee pain. Lives with family at home. Family also serves as HHA. Admitted for sepsis due to PNA and UTI.     Admitted to ICU for; downgraded to medicine when appropriate:  1) Acute respiratory failure with hypoxia 2/2 right multifocal pneumonia, severe, requiring mechanical ventilation  CURB-65 score = 3 on admission  -extubated on 10/23  -s/p vancomycin 1 gm and zosyn in ed  -c/w Levaquin  -Dr. Olsen- ID  -negative urine legionella, RVP and sputum cx  - f/u strep antigen, procalcitonin  -negative BCx     2) Sepsis  2/3 qsofa criteria met (change in mental status, tachypnea) 2/2 right multifocal pneumonia and UTI  now resolved  lactate normalized after 3700cc fluid  -of fluids  - broad spectrum with zosyn and Levaquin, now only on Levaquin  - long discontinued on 10/24  -UCx <50,000 e.coli, pansensitive  -blood culture neg    3) Urinary tract infection without hematuria, site unspecified.  Recommendation: likely contributing to sepsis    -<50,000 Ecoli UCx (pan sensitive)     4) Gastrointestinal hemorrhage, unspecified gastrointestinal hemorrhage type.    -bright red blood via NGT 2/2 possibly UGIB  - Protonix gtt switched to po ppi  - GI consult Dr. Cedillo- signed off, patient is stable, H&H stable    5) Transaminitis  possibly 2/2 hypovolemia and sepsis  not obstructive picture  -trending down, continue to trend    6) Hyponatremia  possibly 2/2 legionella pneumonia on admission, urine legionella negative  hypovolemic hyponatremia  -resolved    7) History of hypertension  resumed Losartan, monitoring BP  will start Hydrochlorothiazide  Continued Hyzaar on discharge    8) Need for prophylactic measure  improve score =3, originally held off on DVT ppx due to suspected GI bleed, now on Heparin sq  Protonix for GI ppx Patient is a 87 yo F with PMHX of legal blindness, HTN, and prediabetes came in with c/o fever since the past 2-3 days prior to admission. She said she also felt weak so her family bought her in. She was also SOB. Denied chest pain, abdominal pain, or dysuria. Denied nausea or vomiting. She c/o chronic b/l knee pain due to arthritis. Also stated having pain in small ulcer below her left arm along with itching since the past couple of days. Denied cough, but does have phlegm production. No other complaints at this time. Patient was febrile up to 102F in the ED with lactate of 2.1. No leukocytosis or left shift. UA: dirty. CXR- PNA. Patient is mostly bedbound. Ambulates rarely with walker due to chronic knee pain. Lives with family at home. Family also serves as HHA. Admitted for sepsis due to PNA and UTI.     Admitted to ICU for; downgraded to medicine when appropriate:  1) Acute respiratory failure with hypoxia 2/2 right multifocal pneumonia, severe, requiring mechanical ventilation  CURB-65 score = 3 on admission  -extubated on 10/23  -s/p vancomycin 1 gm and zosyn in ed  -c/w Levaquin  -Dr. Olsen- ID  -negative urine legionella, RVP and sputum cx  - f/u strep antigen, procalcitonin  -negative BCx     2) Sepsis  2/3 qsofa criteria met (change in mental status, tachypnea) 2/2 right multifocal pneumonia and UTI  now resolved  lactate normalized after 3700cc fluid  -of fluids  - broad spectrum with zosyn and Levaquin, now only on Levaquin  - long discontinued on 10/24  -UCx <50,000 e.coli, pansensitive  -blood culture neg    3) Urinary tract infection without hematuria, site unspecified.  Recommendation: likely contributing to sepsis    -<50,000 Ecoli UCx (pan sensitive)     4) Gastrointestinal hemorrhage, unspecified gastrointestinal hemorrhage type.    -bright red blood via NGT 2/2 possibly UGIB  - Protonix gtt switched to po ppi  - GI consult Dr. Cedillo- signed off, patient is stable, H&H stable    5) Transaminitis  possibly 2/2 hypovolemia and sepsis  not obstructive picture  -trending down, continue to trend    6) Hyponatremia  possibly 2/2 legionella pneumonia on admission, urine legionella negative  hypovolemic hyponatremia  -resolved    7) History of hypertension  resumed Losartan, monitoring BP  will start Hydrochlorothiazide  Continued Hyzaar on discharge    8) Need for prophylactic measure  improve score =3, originally held off on DVT ppx due to suspected GI bleed, now on Heparin sq  Protonix for GI ppx

## 2017-10-27 NOTE — DISCHARGE NOTE ADULT - OTHER SIGNIFICANT FINDINGS
Medicaine attending note:  Patient is medically stable for discharge home. Patient to follow up with primary care physician within 1 week.  Aimee Garcia MD  45 minutes spent performing discharge services  10/27/2017

## 2017-10-27 NOTE — PROGRESS NOTE ADULT - PROVIDER SPECIALTY LIST ADULT
Cardiology
Critical Care
Gastroenterology
Infectious Disease
Internal Medicine
Cardiology
Critical Care
Infectious Disease
Internal Medicine

## 2017-10-27 NOTE — PROGRESS NOTE ADULT - SUBJECTIVE AND OBJECTIVE BOX
pt seen and examined, no complaints on exam.    no events overnight     acetaminophen   Tablet. 650 milliGRAM(s) Oral every 6 hours PRN  acetaminophen  Suppository 650 milliGRAM(s) Rectal every 6 hours PRN  ALBUTerol/ipratropium for Nebulization 3 milliLiter(s) Nebulizer every 6 hours  artificial  tears Solution 1 Drop(s) Both EYES two times a day  furosemide    Tablet 40 milliGRAM(s) Oral daily  heparin  Injectable 5000 Unit(s) SubCutaneous every 8 hours  hydrocortisone 1% Ointment 1 Application(s) Topical two times a day  levoFLOXacin  Tablet 750 milliGRAM(s) Oral every 24 hours  losartan 25 milliGRAM(s) Oral daily  nystatin Powder 1 Application(s) Topical three times a day  pantoprazole    Tablet 40 milliGRAM(s) Oral before breakfast  polyethylene glycol 3350 17 Gram(s) Oral at bedtime  senna 2 Tablet(s) Oral at bedtime    Hemoglobin: 10.9 g/dL (10-25 @ 05:57)  Hemoglobin: 11.2 g/dL (10-24 @ 06:34)  Hemoglobin: 10.8 g/dL (10-23 @ 06:35)    Creatinine Trend: 0.56<--, 0.66<--, 0.60<--, 0.81<--, 0.87<--, 0.97<--    COAGS:       T(C): 37.1 (10-27-17 @ 05:34), Max: 37.3 (10-26-17 @ 20:35)  HR: 70 (10-27-17 @ 05:34) (60 - 76)  BP: 137/68 (10-27-17 @ 05:34) (128/70 - 155/68)  RR: 16 (10-27-17 @ 05:34) (16 - 18)  SpO2: 94% (10-27-17 @ 05:34) (94% - 100%)  Wt(kg): --    I&O's Summary      HEENT:   Normal oral mucosa, PERRL, EOMI	  Lymphatic: No obvious lymphadenopathy , no edema  Cardiovascular: Normal S1 S2, No JVD, 1/6 TAI murmur, Peripheral pulses palpable 2+ bilaterally  Respiratory: Lungs clear to auscultation, normal effort 	  Gastrointestinal:  Soft, Non-tender, + BS	    TELEMETRY: 	  Sinus  echo   < from: Transthoracic Echocardiogram (10.22.17 @ 07:56) >  ---------------------------  CONCLUSIONS:  1. Normal mitral valve. Mild mitral regurgitation.  2. Normal trileaflet aortic valve. Mild aortic  regurgitation.  3. Aortic Root: 3.8 cm.  4. Normal left atrium.  5. Normal left ventricular internal dimensions and wall  thicknesses.  6. Normal Left Ventricular Systolic Function,  (EF = 55 to  60%)  7. Grade II diastolic dysfunction.  8. Normal right atrium.  9. Normal right ventricular size and function.  10. There is mild tricuspid regurgitation.  11. Normal pericardium with no pericardial effusion.    < end of copied text >    ASSESSMENT/PLAN: 	86y Female mostly bedbound HTN, and ?prediabetes came in with c/o fever since the past 2-3 days. found with sepsis due to UTI and PNA (+) Trop. Normal LV and RV function on echo.    - Aggressive chest pt   - Asa , statin, ACE , hctz   - suspect demand ischemia  - Abx per medicine   -  GI / DVT prophylaxis.  - keep K>4, mag >2.0    - pulm follow up noted   no s/s of chf on exam   D/W Dr Mensah

## 2017-10-27 NOTE — PROGRESS NOTE ADULT - SUBJECTIVE AND OBJECTIVE BOX
Patient seen and examined.   afebrile.   no acute distress.   c/o chronic right hip pain.     MEDICATIONS  (STANDING):  ALBUTerol/ipratropium for Nebulization 3 milliLiter(s) Nebulizer every 6 hours  artificial  tears Solution 1 Drop(s) Both EYES two times a day  furosemide    Tablet 40 milliGRAM(s) Oral daily  heparin  Injectable 5000 Unit(s) SubCutaneous every 8 hours  hydrocortisone 1% Ointment 1 Application(s) Topical two times a day  levoFLOXacin  Tablet 750 milliGRAM(s) Oral every 24 hours  losartan 25 milliGRAM(s) Oral daily  nystatin Powder 1 Application(s) Topical three times a day  pantoprazole    Tablet 40 milliGRAM(s) Oral before breakfast  polyethylene glycol 3350 17 Gram(s) Oral at bedtime  senna 2 Tablet(s) Oral at bedtime      MEDICATIONS  (PRN):  acetaminophen   Tablet. 650 milliGRAM(s) Oral every 6 hours PRN Mild Pain (1 - 3)  acetaminophen  Suppository 650 milliGRAM(s) Rectal every 6 hours PRN For Temp greater than 38 C (100.4 F)       Medications up to date at time of exam.      PHYSICAL EXAMINATION:      Vital Signs Last 24 Hrs  T(C): 37.1 (27 Oct 2017 05:34), Max: 37.3 (26 Oct 2017 20:35)  T(F): 98.7 (27 Oct 2017 05:34), Max: 99.1 (26 Oct 2017 20:35)  HR: 70 (27 Oct 2017 05:34) (60 - 76)  BP: 137/68 (27 Oct 2017 05:34) (137/63 - 155/68)  BP(mean): --  RR: 16 (27 Oct 2017 05:34) (16 - 16)  SpO2: 94% (27 Oct 2017 05:34) (94% - 100%)   (if applicable)    GENERAL: The patient is a well-developed, well-nourished, in no apparent distress.     HEENT: orally intubated.     NECK: Supple, no palpable adenopathy.    LUNGS: BLAE +, basal crepts +. .    HEART: S1S2 normal.     ABDOMEN: Soft, nontender, and nondistended.  No hepatosplenomegaly is noted.    EXTREMITIES: Without any cyanosis, clubbing, rash, lesions or edema.    NEUROLOGIC: Awake, alert, responsive.    SKIN:   No new change noticed.    LABS:              < from: Xray Chest 1 View AP -PORTABLE-Routine (10.26.17 @ 09:51) >  IMPRESSION:  Mild diffuse interstitial prominence, similar to the prior study. Right   lung opacities are essentially resolved.    < end of copied text >      Culture - Urine (10.21.17 @ 23:33)    -  Ampicillin/Sulbactam: R >16/8    -  Aztreonam: S <=4    -  Cefazolin: I 16    -  Cefepime: S <=2    -  Cefoxitin: S <=4    -  Ceftazidime: S <=1    -  Ceftriaxone: S <=1    -  Ciprofloxacin: S <=0.5    -  Ertapenem: S <=0.5    -  Gentamicin: R >8    -  Imipenem: S <=1    -  Levofloxacin: S <=1    -  Meropenem: S <=1    -  Nitrofurantoin: S <=32    -  Piperacillin/Tazobactam: S <=8    -  Tobramycin: S 4    -  Trimethoprim/Sulfamethoxazole: R >2/38    -  Amikacin: S <=8    -  Ampicillin: R >16    Specimen Source: .Urine Catheterized    Culture Results:   10,000 - 49,000 CFU/mL Escherichia coli    Organism Identification: Escherichia coli    Organism: Escherichia coli    Method Type: NIIDA                    Culture Results:   No growth at 48 hours (10-22-17 @ 16:26)  Culture Results:   No growth to date. (10-22-17 @ 00:50)  Culture Results:   No growth to date. (10-22-17 @ 00:41)  Culture Results:   10,000 - 49,000 CFU/mL Escherichia coli (10-21-17 @ 23:33)  culture  MICROBIOLOGY: (if applicable)    RADIOLOGY & ADDITIONAL STUDIES:  EKG:   CXR:< from: Xray Chest 1 View AP -PORTABLE-Routine (10.26.17 @ 09:51) >  IMPRESSION:  Mild diffuse interstitial prominence, similar to the prior study. Right   lung opacities are essentially resolved.      < end of copied text >    ECHO:< from: Transthoracic Echocardiogram (10.22.17 @ 07:56) >  CONCLUSIONS:  1. Normal mitral valve. Mild mitral regurgitation.  2. Normal trileaflet aortic valve. Mild aortic  regurgitation.  3. Aortic Root: 3.8 cm.  4. Normal left atrium.  5. Normal left ventricular internal dimensions and wall  thicknesses.  6. Normal Left Ventricular Systolic Function,  (EF = 55 to  60%)  7. Grade II diastolic dysfunction.  8. Normal right atrium.  9. Normal right ventricular size and function.  10. There is mild tricuspid regurgitation.  11. Normal pericardium with no pericardial effusion.    < end of copied text >          Culture Results:   No growth at 48 hours (10-22-17 @ 16:26)  Culture Results:   No growth at 5 days. (10-22-17 @ 00:50)  Culture Results:   No growth at 5 days. (10-22-17 @ 00:41)  Culture Results:   10,000 - 49,000 CFU/mL Escherichia coli (10-21-17 @ 23:33)  culture  MICROBIOLOGY: (if applicable)    RADIOLOGY & ADDITIONAL STUDIES:  EKG:   CXR:  ECHO:      RECOMMENDATIONS:

## 2017-10-27 NOTE — DISCHARGE NOTE ADULT - MEDICATION SUMMARY - MEDICATIONS TO STOP TAKING
I will STOP taking the medications listed below when I get home from the hospital:    doxycycline hyclate 100 mg oral tablet  -- 1 tab(s) by mouth 2 times a day for 7 days. I will STOP taking the medications listed below when I get home from the hospital:    traMADol  --  by mouth    Tylenol with Codeine #3 oral tablet  -- 1 tab(s) by mouth once a day (at bedtime), As Needed - for severe pain  -- Caution federal law prohibits the transfer of this drug to any person other  than the person for whom it was prescribed.  May cause drowsiness.  Alcohol may intensify this effect.  Use care when operating dangerous machinery.  This product contains acetaminophen.  Do not use  with any other product containing acetaminophen to prevent possible liver damage.  Using more of this medication than prescribed may cause serious breathing problems.    enalapril 2.5 mg oral tablet  -- 1 tab(s) by mouth once a day    docusate sodium 100 mg oral capsule  -- 1 cap(s) by mouth 2 times a day, As needed, Constipation    senna oral tablet  -- 2 tab(s) by mouth once a day (at bedtime), As needed, Constipation    doxycycline hyclate 100 mg oral tablet  -- 1 tab(s) by mouth 2 times a day for 7 days.    Hyzaar

## 2017-10-27 NOTE — DISCHARGE NOTE ADULT - PLAN OF CARE
Void of infectious process Continue antibiotic therapy until completed  Fluid hydration  Office visit with primary doctor in 1 week Leukocytosis resolved Continue antibiotic therapy until completed Resolved Considered secondary to infectious process BP =/< 130/80 Low sodium diet Resolution Office visit with primary doctor in 1 week repeat hepatic panel. Continue topical ointment - keep clean and dry

## 2017-10-27 NOTE — PROGRESS NOTE ADULT - ASSESSMENT
86yo F with pmhx of legal blindness, HTN, and ?prediabetes came in with c/o fever since the past 2-3 days and looking increasingly weak on day of admission. Pt was initially given ceftriaxone and azithro for covering CAP and UTI as CXR showed right sided infiltrate and u/a was positive.   pt was code sepsis in ER. she had WBC 4k with 13% bands, elevated transaminases and mildly elevated lactate. Patient had RR on floor for respiratory distress and AMS> she had hypoxia to 75% and was intubated by anesthesia and now in ICU.   ABX was broadened to zosyn and levofloxacin    Impression:   1. right sided pneumonia, community acquired. CXR from yesterday improved. Blood culture and urine legionella ag negative. sputum culture no growth.   2. acute hypoxic respiratory failure sec to pneumonia  3. positive urinalysis: asymptomatic, u/a collection(from bedpan) unlikley clean catch. growing e coli, quinolone sensitive.     Plan:  ·	PO levofloxacin to complete 7 days of rx.     ID signs off.      ·	follow up blood cultures and urine cultures  ·	follow up urine legionella ag and send pneumococcal ag  ·	if any ET tube secretions send it for gram stain and culture.   ·	continue Zosyn and levofloxacin for now  ·	ICU care.  d/w team.   [facility] 87yo F with pmhx of legal blindness, HTN, and ?prediabetes came in with c/o fever since the past 2-3 days and looking increasingly weak on day of admission. Pt was initially given ceftriaxone and azithro for covering CAP and UTI as CXR showed right sided infiltrate and u/a was positive.   pt was code sepsis in ER. she had WBC 4k with 13% bands, elevated transaminases and mildly elevated lactate. Patient had RR on floor for respiratory distress and AMS> she had hypoxia to 75% and was intubated by anesthesia and now in ICU.   ABX was broadened to zosyn and levofloxacin    Impression:   1. right sided pneumonia, community acquired. CXR from yesterday improved. Blood culture and urine legionella ag negative. sputum culture no growth.   2. acute hypoxic respiratory failure sec to pneumonia  3. positive urinalysis: asymptomatic, u/a collection(from bedpan) unlikley clean catch. growing e coli, quinolone sensitive.     Plan:  ·	PO levofloxacin to complete 7 days of rx.     ID signs off.      ·	follow up blood cultures and urine cultures  ·	follow up urine legionella ag and send pneumococcal ag  ·	if any ET tube secretions send it for gram stain and culture.   ·	continue Zosyn and levofloxacin for now  ·	ICU care.  d/w team.   [facility]

## 2017-10-27 NOTE — DISCHARGE NOTE ADULT - CARE PLAN
Principal Discharge DX:	Pneumonia, unspecified organism  Goal:	Void of infectious process  Instructions for follow-up, activity and diet:	Continue antibiotic therapy until completed  Fluid hydration  Office visit with primary doctor in 1 week  Secondary Diagnosis:	Sepsis, due to unspecified organism  Goal:	Leukocytosis resolved  Instructions for follow-up, activity and diet:	Continue antibiotic therapy until completed  Secondary Diagnosis:	Acute respiratory failure with hypoxia  Goal:	Resolved  Instructions for follow-up, activity and diet:	Considered secondary to infectious process  Secondary Diagnosis:	History of hypertension  Goal:	BP =/< 130/80  Instructions for follow-up, activity and diet:	Low sodium diet  Secondary Diagnosis:	Transaminitis  Goal:	Resolution  Instructions for follow-up, activity and diet:	Office visit with primary doctor in 1 week repeat hepatic panel.  Secondary Diagnosis:	Contact dermatitis, unspecified contact dermatitis type, unspecified trigger  Goal:	Resolution  Instructions for follow-up, activity and diet:	Continue topical ointment - keep clean and dry

## 2017-10-27 NOTE — DISCHARGE NOTE ADULT - PATIENT PORTAL LINK FT
“You can access the [facility] Patient Portal, offered by [facility], by registering with the following website: [email or web address]”

## 2017-11-05 DIAGNOSIS — Z74.01 BED CONFINEMENT STATUS: ICD-10-CM

## 2017-11-05 DIAGNOSIS — H26.9 UNSPECIFIED CATARACT: ICD-10-CM

## 2017-11-05 DIAGNOSIS — D72.819 DECREASED WHITE BLOOD CELL COUNT, UNSPECIFIED: ICD-10-CM

## 2017-11-05 DIAGNOSIS — H40.9 UNSPECIFIED GLAUCOMA: ICD-10-CM

## 2017-11-05 DIAGNOSIS — L98.498 NON-PRESSURE CHRONIC ULCER OF SKIN OF OTHER SITES WITH OTHER SPECIFIED SEVERITY: ICD-10-CM

## 2017-11-05 DIAGNOSIS — N39.0 URINARY TRACT INFECTION, SITE NOT SPECIFIED: ICD-10-CM

## 2017-11-05 DIAGNOSIS — I50.30 UNSPECIFIED DIASTOLIC (CONGESTIVE) HEART FAILURE: ICD-10-CM

## 2017-11-05 DIAGNOSIS — J96.01 ACUTE RESPIRATORY FAILURE WITH HYPOXIA: ICD-10-CM

## 2017-11-05 DIAGNOSIS — N30.01 ACUTE CYSTITIS WITH HEMATURIA: ICD-10-CM

## 2017-11-05 DIAGNOSIS — M19.90 UNSPECIFIED OSTEOARTHRITIS, UNSPECIFIED SITE: ICD-10-CM

## 2017-11-05 DIAGNOSIS — I11.0 HYPERTENSIVE HEART DISEASE WITH HEART FAILURE: ICD-10-CM

## 2017-11-05 DIAGNOSIS — E87.1 HYPO-OSMOLALITY AND HYPONATREMIA: ICD-10-CM

## 2017-11-05 DIAGNOSIS — L25.9 UNSPECIFIED CONTACT DERMATITIS, UNSPECIFIED CAUSE: ICD-10-CM

## 2017-11-05 DIAGNOSIS — J18.9 PNEUMONIA, UNSPECIFIED ORGANISM: ICD-10-CM

## 2017-11-05 DIAGNOSIS — I24.8 OTHER FORMS OF ACUTE ISCHEMIC HEART DISEASE: ICD-10-CM

## 2017-11-05 DIAGNOSIS — A41.9 SEPSIS, UNSPECIFIED ORGANISM: ICD-10-CM

## 2017-11-05 DIAGNOSIS — R73.03 PREDIABETES: ICD-10-CM

## 2017-11-05 DIAGNOSIS — H54.8 LEGAL BLINDNESS, AS DEFINED IN USA: ICD-10-CM

## 2017-11-05 DIAGNOSIS — K92.2 GASTROINTESTINAL HEMORRHAGE, UNSPECIFIED: ICD-10-CM

## 2017-12-19 PROCEDURE — 86901 BLOOD TYPING SEROLOGIC RH(D): CPT

## 2017-12-19 PROCEDURE — 87186 SC STD MICRODIL/AGAR DIL: CPT

## 2017-12-19 PROCEDURE — 96374 THER/PROPH/DIAG INJ IV PUSH: CPT

## 2017-12-19 PROCEDURE — 82553 CREATINE MB FRACTION: CPT

## 2017-12-19 PROCEDURE — 97162 PT EVAL MOD COMPLEX 30 MIN: CPT

## 2017-12-19 PROCEDURE — 86704 HEP B CORE ANTIBODY TOTAL: CPT

## 2017-12-19 PROCEDURE — 85610 PROTHROMBIN TIME: CPT

## 2017-12-19 PROCEDURE — 83036 HEMOGLOBIN GLYCOSYLATED A1C: CPT

## 2017-12-19 PROCEDURE — 87449 NOS EACH ORGANISM AG IA: CPT

## 2017-12-19 PROCEDURE — 92610 EVALUATE SWALLOWING FUNCTION: CPT

## 2017-12-19 PROCEDURE — 86900 BLOOD TYPING SEROLOGIC ABO: CPT

## 2017-12-19 PROCEDURE — 87340 HEPATITIS B SURFACE AG IA: CPT

## 2017-12-19 PROCEDURE — 86803 HEPATITIS C AB TEST: CPT

## 2017-12-19 PROCEDURE — 96375 TX/PRO/DX INJ NEW DRUG ADDON: CPT

## 2017-12-19 PROCEDURE — 99285 EMERGENCY DEPT VISIT HI MDM: CPT | Mod: 25

## 2017-12-19 PROCEDURE — 83735 ASSAY OF MAGNESIUM: CPT

## 2017-12-19 PROCEDURE — 90686 IIV4 VACC NO PRSV 0.5 ML IM: CPT

## 2017-12-19 PROCEDURE — 71045 X-RAY EXAM CHEST 1 VIEW: CPT

## 2017-12-19 PROCEDURE — 86850 RBC ANTIBODY SCREEN: CPT

## 2017-12-19 PROCEDURE — 84484 ASSAY OF TROPONIN QUANT: CPT

## 2017-12-19 PROCEDURE — 87899 AGENT NOS ASSAY W/OPTIC: CPT

## 2017-12-19 PROCEDURE — 94640 AIRWAY INHALATION TREATMENT: CPT

## 2017-12-19 PROCEDURE — 83605 ASSAY OF LACTIC ACID: CPT

## 2017-12-19 PROCEDURE — 86706 HEP B SURFACE ANTIBODY: CPT

## 2017-12-19 PROCEDURE — 93970 EXTREMITY STUDY: CPT

## 2017-12-19 PROCEDURE — 94003 VENT MGMT INPAT SUBQ DAY: CPT

## 2017-12-19 PROCEDURE — 84100 ASSAY OF PHOSPHORUS: CPT

## 2017-12-19 PROCEDURE — 81001 URINALYSIS AUTO W/SCOPE: CPT

## 2017-12-19 PROCEDURE — 87581 M.PNEUMON DNA AMP PROBE: CPT

## 2017-12-19 PROCEDURE — 87040 BLOOD CULTURE FOR BACTERIA: CPT

## 2017-12-19 PROCEDURE — 93306 TTE W/DOPPLER COMPLETE: CPT

## 2017-12-19 PROCEDURE — 82962 GLUCOSE BLOOD TEST: CPT

## 2017-12-19 PROCEDURE — 93005 ELECTROCARDIOGRAM TRACING: CPT

## 2017-12-19 PROCEDURE — 87086 URINE CULTURE/COLONY COUNT: CPT

## 2017-12-19 PROCEDURE — 82803 BLOOD GASES ANY COMBINATION: CPT

## 2017-12-19 PROCEDURE — 87486 CHLMYD PNEUM DNA AMP PROBE: CPT

## 2017-12-19 PROCEDURE — 87070 CULTURE OTHR SPECIMN AEROBIC: CPT

## 2017-12-19 PROCEDURE — 94760 N-INVAS EAR/PLS OXIMETRY 1: CPT

## 2017-12-19 PROCEDURE — 94002 VENT MGMT INPAT INIT DAY: CPT

## 2017-12-19 PROCEDURE — 83880 ASSAY OF NATRIURETIC PEPTIDE: CPT

## 2017-12-19 PROCEDURE — 80053 COMPREHEN METABOLIC PANEL: CPT

## 2017-12-19 PROCEDURE — 82550 ASSAY OF CK (CPK): CPT

## 2017-12-19 PROCEDURE — 87798 DETECT AGENT NOS DNA AMP: CPT

## 2017-12-19 PROCEDURE — 87633 RESP VIRUS 12-25 TARGETS: CPT

## 2017-12-19 PROCEDURE — 85730 THROMBOPLASTIN TIME PARTIAL: CPT

## 2017-12-19 PROCEDURE — 80061 LIPID PANEL: CPT

## 2017-12-19 PROCEDURE — 85027 COMPLETE CBC AUTOMATED: CPT

## 2019-05-24 ENCOUNTER — EMERGENCY (EMERGENCY)
Facility: HOSPITAL | Age: 84
LOS: 1 days | Discharge: HOME | End: 2019-05-24
Attending: EMERGENCY MEDICINE
Payer: MEDICARE

## 2019-05-24 VITALS
SYSTOLIC BLOOD PRESSURE: 190 MMHG | OXYGEN SATURATION: 98 % | WEIGHT: 289.91 LBS | HEART RATE: 65 BPM | RESPIRATION RATE: 16 BRPM | HEIGHT: 72 IN | TEMPERATURE: 98 F | DIASTOLIC BLOOD PRESSURE: 100 MMHG

## 2019-05-24 PROCEDURE — 99284 EMERGENCY DEPT VISIT MOD MDM: CPT | Mod: 25

## 2019-05-24 RX ORDER — ALPRAZOLAM 0.25 MG
0.25 TABLET ORAL ONCE
Refills: 0 | Status: DISCONTINUED | OUTPATIENT
Start: 2019-05-24 | End: 2019-05-24

## 2019-05-24 RX ADMIN — Medication 0.25 MILLIGRAM(S): at 23:49

## 2019-05-25 VITALS
DIASTOLIC BLOOD PRESSURE: 89 MMHG | TEMPERATURE: 98 F | HEART RATE: 55 BPM | SYSTOLIC BLOOD PRESSURE: 172 MMHG | RESPIRATION RATE: 17 BRPM | OXYGEN SATURATION: 98 %

## 2019-05-25 PROBLEM — H54.8 LEGAL BLINDNESS, AS DEFINED IN USA: Chronic | Status: ACTIVE | Noted: 2017-10-21

## 2019-05-25 LAB
ALBUMIN SERPL ELPH-MCNC: 3.8 G/DL — SIGNIFICANT CHANGE UP (ref 3.5–5)
ALP SERPL-CCNC: 149 U/L — HIGH (ref 40–120)
ALT FLD-CCNC: 15 U/L DA — SIGNIFICANT CHANGE UP (ref 10–60)
ANION GAP SERPL CALC-SCNC: 6 MMOL/L — SIGNIFICANT CHANGE UP (ref 5–17)
AST SERPL-CCNC: 16 U/L — SIGNIFICANT CHANGE UP (ref 10–40)
BASOPHILS # BLD AUTO: 0.04 K/UL — SIGNIFICANT CHANGE UP (ref 0–0.2)
BASOPHILS NFR BLD AUTO: 0.6 % — SIGNIFICANT CHANGE UP (ref 0–2)
BILIRUB SERPL-MCNC: 0.4 MG/DL — SIGNIFICANT CHANGE UP (ref 0.2–1.2)
BUN SERPL-MCNC: 13 MG/DL — SIGNIFICANT CHANGE UP (ref 7–18)
CALCIUM SERPL-MCNC: 8.5 MG/DL — SIGNIFICANT CHANGE UP (ref 8.4–10.5)
CHLORIDE SERPL-SCNC: 103 MMOL/L — SIGNIFICANT CHANGE UP (ref 96–108)
CO2 SERPL-SCNC: 30 MMOL/L — SIGNIFICANT CHANGE UP (ref 22–31)
CREAT SERPL-MCNC: 0.92 MG/DL — SIGNIFICANT CHANGE UP (ref 0.5–1.3)
EOSINOPHIL # BLD AUTO: 1.82 K/UL — HIGH (ref 0–0.5)
EOSINOPHIL NFR BLD AUTO: 26.4 % — HIGH (ref 0–6)
GLUCOSE SERPL-MCNC: 118 MG/DL — HIGH (ref 70–99)
HCT VFR BLD CALC: 40 % — SIGNIFICANT CHANGE UP (ref 34.5–45)
HGB BLD-MCNC: 12.8 G/DL — SIGNIFICANT CHANGE UP (ref 11.5–15.5)
IMM GRANULOCYTES NFR BLD AUTO: 0.1 % — SIGNIFICANT CHANGE UP (ref 0–1.5)
LYMPHOCYTES # BLD AUTO: 1.74 K/UL — SIGNIFICANT CHANGE UP (ref 1–3.3)
LYMPHOCYTES # BLD AUTO: 25.2 % — SIGNIFICANT CHANGE UP (ref 13–44)
MCHC RBC-ENTMCNC: 30.6 PG — SIGNIFICANT CHANGE UP (ref 27–34)
MCHC RBC-ENTMCNC: 32 GM/DL — SIGNIFICANT CHANGE UP (ref 32–36)
MCV RBC AUTO: 95.7 FL — SIGNIFICANT CHANGE UP (ref 80–100)
MONOCYTES # BLD AUTO: 0.54 K/UL — SIGNIFICANT CHANGE UP (ref 0–0.9)
MONOCYTES NFR BLD AUTO: 7.8 % — SIGNIFICANT CHANGE UP (ref 2–14)
NEUTROPHILS # BLD AUTO: 2.75 K/UL — SIGNIFICANT CHANGE UP (ref 1.8–7.4)
NEUTROPHILS NFR BLD AUTO: 39.9 % — LOW (ref 43–77)
NRBC # BLD: 0 /100 WBCS — SIGNIFICANT CHANGE UP (ref 0–0)
PLATELET # BLD AUTO: 243 K/UL — SIGNIFICANT CHANGE UP (ref 150–400)
POTASSIUM SERPL-MCNC: 4.1 MMOL/L — SIGNIFICANT CHANGE UP (ref 3.5–5.3)
POTASSIUM SERPL-SCNC: 4.1 MMOL/L — SIGNIFICANT CHANGE UP (ref 3.5–5.3)
PROT SERPL-MCNC: 7.8 G/DL — SIGNIFICANT CHANGE UP (ref 6–8.3)
RBC # BLD: 4.18 M/UL — SIGNIFICANT CHANGE UP (ref 3.8–5.2)
RBC # FLD: 12.8 % — SIGNIFICANT CHANGE UP (ref 10.3–14.5)
SODIUM SERPL-SCNC: 139 MMOL/L — SIGNIFICANT CHANGE UP (ref 135–145)
WBC # BLD: 6.9 K/UL — SIGNIFICANT CHANGE UP (ref 3.8–10.5)
WBC # FLD AUTO: 6.9 K/UL — SIGNIFICANT CHANGE UP (ref 3.8–10.5)

## 2019-05-25 PROCEDURE — 71260 CT THORAX DX C+: CPT | Mod: 26

## 2019-05-25 NOTE — ED PROVIDER NOTE - OBJECTIVE STATEMENT
88 y/o F patient with a significant PMHx of Arthritis, HTN, legally blind and no significant PSHx presents to the ED with c/o family noticing a blood under her right breast nipple that they noticed today. Patient denies any fever, chest pain, abdominal pain. Patient states that she never had this before.  NKDA.

## 2019-05-25 NOTE — ED PROVIDER NOTE - CLINICAL SUMMARY MEDICAL DECISION MAKING FREE TEXT BOX
87 year old female with right breast discharge/mass. vitals WNL. PE as above.  labs are unremarkable. 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 advised family pt to f/u with breast surgery and PMD ans high likelihood of cancer. will discharge. return precautions given.

## 2019-05-25 NOTE — ED PROVIDER NOTE - MUSCULOSKELETAL MINIMAL EXAM
Breast exam: right breast - palpable 3 cm mass nontender, solid, no fluctuance. palpation-serosanguinous discharge, left breast within limits

## 2019-05-27 LAB
-  AMIKACIN: SIGNIFICANT CHANGE UP
-  AMPICILLIN/SULBACTAM: SIGNIFICANT CHANGE UP
-  CEFEPIME: SIGNIFICANT CHANGE UP
-  CEFTAZIDIME: SIGNIFICANT CHANGE UP
-  CIPROFLOXACIN: SIGNIFICANT CHANGE UP
-  GENTAMICIN: SIGNIFICANT CHANGE UP
-  LEVOFLOXACIN: SIGNIFICANT CHANGE UP
-  MEROPENEM: SIGNIFICANT CHANGE UP
-  TOBRAMYCIN: SIGNIFICANT CHANGE UP
-  TRIMETHOPRIM/SULFAMETHOXAZOLE: SIGNIFICANT CHANGE UP
METHOD TYPE: SIGNIFICANT CHANGE UP

## 2019-05-28 LAB
-  IMIPENEM: SIGNIFICANT CHANGE UP
-  PIPERACILLIN/TAZOBACTAM: SIGNIFICANT CHANGE UP
METHOD TYPE: SIGNIFICANT CHANGE UP

## 2019-05-30 LAB
-  IMIPENEM: SIGNIFICANT CHANGE UP
-  PIPERACILLIN/TAZOBACTAM: SIGNIFICANT CHANGE UP
CULTURE RESULTS: SIGNIFICANT CHANGE UP
METHOD TYPE: SIGNIFICANT CHANGE UP
ORGANISM # SPEC MICROSCOPIC CNT: SIGNIFICANT CHANGE UP
SPECIMEN SOURCE: SIGNIFICANT CHANGE UP

## 2019-06-05 ENCOUNTER — RESULT REVIEW (OUTPATIENT)
Age: 84
End: 2019-06-05

## 2019-06-12 ENCOUNTER — APPOINTMENT (OUTPATIENT)
Dept: ULTRASOUND IMAGING | Facility: IMAGING CENTER | Age: 84
End: 2019-06-12

## 2019-06-12 ENCOUNTER — APPOINTMENT (OUTPATIENT)
Dept: MAMMOGRAPHY | Facility: IMAGING CENTER | Age: 84
End: 2019-06-12

## 2019-06-25 ENCOUNTER — RESULT REVIEW (OUTPATIENT)
Age: 84
End: 2019-06-25

## 2019-06-25 PROCEDURE — 87070 CULTURE OTHR SPECIMN AEROBIC: CPT

## 2019-06-25 PROCEDURE — 36415 COLL VENOUS BLD VENIPUNCTURE: CPT

## 2019-06-25 PROCEDURE — 87186 SC STD MICRODIL/AGAR DIL: CPT

## 2019-06-25 PROCEDURE — 99284 EMERGENCY DEPT VISIT MOD MDM: CPT | Mod: 25

## 2019-06-25 PROCEDURE — 85027 COMPLETE CBC AUTOMATED: CPT

## 2019-06-25 PROCEDURE — 80053 COMPREHEN METABOLIC PANEL: CPT

## 2019-06-25 PROCEDURE — 87184 SC STD DISK METHOD PER PLATE: CPT

## 2019-06-25 PROCEDURE — 71260 CT THORAX DX C+: CPT

## 2019-06-27 ENCOUNTER — APPOINTMENT (OUTPATIENT)
Dept: SURGERY | Facility: CLINIC | Age: 84
End: 2019-06-27

## 2019-06-27 ENCOUNTER — CHART COPY (OUTPATIENT)
Age: 84
End: 2019-06-27

## 2019-07-11 ENCOUNTER — TRANSCRIPTION ENCOUNTER (OUTPATIENT)
Age: 84
End: 2019-07-11

## 2019-07-12 ENCOUNTER — EMERGENCY (EMERGENCY)
Facility: HOSPITAL | Age: 84
LOS: 1 days | Discharge: HOME | End: 2019-07-12
Attending: EMERGENCY MEDICINE
Payer: MEDICARE

## 2019-07-12 VITALS
TEMPERATURE: 99 F | RESPIRATION RATE: 18 BRPM | OXYGEN SATURATION: 98 % | HEART RATE: 72 BPM | DIASTOLIC BLOOD PRESSURE: 82 MMHG | SYSTOLIC BLOOD PRESSURE: 147 MMHG

## 2019-07-12 VITALS
TEMPERATURE: 98 F | HEART RATE: 64 BPM | SYSTOLIC BLOOD PRESSURE: 185 MMHG | RESPIRATION RATE: 16 BRPM | DIASTOLIC BLOOD PRESSURE: 93 MMHG | OXYGEN SATURATION: 96 %

## 2019-07-12 PROBLEM — C50.911 BREAST CANCER, RIGHT BREAST: Status: ACTIVE | Noted: 2019-07-12

## 2019-07-12 LAB
ANION GAP SERPL CALC-SCNC: 6 MMOL/L — SIGNIFICANT CHANGE UP (ref 5–17)
BUN SERPL-MCNC: 13 MG/DL — SIGNIFICANT CHANGE UP (ref 7–18)
CALCIUM SERPL-MCNC: 8.9 MG/DL — SIGNIFICANT CHANGE UP (ref 8.4–10.5)
CHLORIDE SERPL-SCNC: 105 MMOL/L — SIGNIFICANT CHANGE UP (ref 96–108)
CO2 SERPL-SCNC: 28 MMOL/L — SIGNIFICANT CHANGE UP (ref 22–31)
CREAT SERPL-MCNC: 1.12 MG/DL — SIGNIFICANT CHANGE UP (ref 0.5–1.3)
GLUCOSE SERPL-MCNC: 92 MG/DL — SIGNIFICANT CHANGE UP (ref 70–99)
HCT VFR BLD CALC: 41.9 % — SIGNIFICANT CHANGE UP (ref 34.5–45)
HGB BLD-MCNC: 13.3 G/DL — SIGNIFICANT CHANGE UP (ref 11.5–15.5)
LACTATE SERPL-SCNC: 1.4 MMOL/L — SIGNIFICANT CHANGE UP (ref 0.7–2)
MCHC RBC-ENTMCNC: 30.4 PG — SIGNIFICANT CHANGE UP (ref 27–34)
MCHC RBC-ENTMCNC: 31.7 GM/DL — LOW (ref 32–36)
MCV RBC AUTO: 95.7 FL — SIGNIFICANT CHANGE UP (ref 80–100)
NRBC # BLD: 0 /100 WBCS — SIGNIFICANT CHANGE UP (ref 0–0)
PLATELET # BLD AUTO: 250 K/UL — SIGNIFICANT CHANGE UP (ref 150–400)
POTASSIUM SERPL-MCNC: 4.7 MMOL/L — SIGNIFICANT CHANGE UP (ref 3.5–5.3)
POTASSIUM SERPL-SCNC: 4.7 MMOL/L — SIGNIFICANT CHANGE UP (ref 3.5–5.3)
RBC # BLD: 4.38 M/UL — SIGNIFICANT CHANGE UP (ref 3.8–5.2)
RBC # FLD: 12.6 % — SIGNIFICANT CHANGE UP (ref 10.3–14.5)
SODIUM SERPL-SCNC: 139 MMOL/L — SIGNIFICANT CHANGE UP (ref 135–145)
WBC # BLD: 8.19 K/UL — SIGNIFICANT CHANGE UP (ref 3.8–10.5)
WBC # FLD AUTO: 8.19 K/UL — SIGNIFICANT CHANGE UP (ref 3.8–10.5)

## 2019-07-12 PROCEDURE — 93923 UPR/LXTR ART STDY 3+ LVLS: CPT | Mod: 26

## 2019-07-12 PROCEDURE — 74177 CT ABD & PELVIS W/CONTRAST: CPT | Mod: 26

## 2019-07-12 PROCEDURE — 80048 BASIC METABOLIC PNL TOTAL CA: CPT

## 2019-07-12 PROCEDURE — 87040 BLOOD CULTURE FOR BACTERIA: CPT

## 2019-07-12 PROCEDURE — 99284 EMERGENCY DEPT VISIT MOD MDM: CPT

## 2019-07-12 PROCEDURE — 86140 C-REACTIVE PROTEIN: CPT

## 2019-07-12 PROCEDURE — 36415 COLL VENOUS BLD VENIPUNCTURE: CPT

## 2019-07-12 PROCEDURE — 99284 EMERGENCY DEPT VISIT MOD MDM: CPT | Mod: 25

## 2019-07-12 PROCEDURE — 74177 CT ABD & PELVIS W/CONTRAST: CPT

## 2019-07-12 PROCEDURE — 73501 X-RAY EXAM HIP UNI 1 VIEW: CPT

## 2019-07-12 PROCEDURE — 93923 UPR/LXTR ART STDY 3+ LVLS: CPT

## 2019-07-12 PROCEDURE — 73501 X-RAY EXAM HIP UNI 1 VIEW: CPT | Mod: 26,RT

## 2019-07-12 PROCEDURE — 83605 ASSAY OF LACTIC ACID: CPT

## 2019-07-12 PROCEDURE — 85027 COMPLETE CBC AUTOMATED: CPT

## 2019-07-12 PROCEDURE — 87186 SC STD MICRODIL/AGAR DIL: CPT

## 2019-07-12 PROCEDURE — 87070 CULTURE OTHR SPECIMN AEROBIC: CPT

## 2019-07-12 NOTE — ED PROVIDER NOTE - MUSCULOSKELETAL, MLM
Tenderness at right hip without deformity, Spine appears normal, range of motion is not limited, no muscle or joint tenderness

## 2019-07-12 NOTE — CONSULT NOTE ADULT - SUBJECTIVE AND OBJECTIVE BOX
S : 87y year old Female seen in the ED at bedside for Right ankle ulceration.      Patient relates to having the ulceration for a few days. Patient states she saw pus coming out of the ulcer when the skin broke. Patient states she went to an urgent care for the treatment and was given antibiotics for it. patient denies any fever, nausea, vomiting,SOB, and chills.    REVIEW OF SYSTEMS:    CONSTITUTIONAL: No weakness, fevers or chills  EYES/ENT: No visual changes;  No vertigo or throat pain   NECK: No pain or stiffness  RESPIRATORY: No cough, wheezing, hemoptysis; No shortness of breath  CARDIOVASCULAR: No chest pain or palpitations  GASTROINTESTINAL: No abdominal or epigastric pain. No nausea, vomiting, or hematemesis; No diarrhea or constipation. No melena or hematochezia.  GENITOURINARY: No dysuria, frequency or hematuria  NEUROLOGICAL: No numbness or weakness  SKIN: No itching, rashes      PMH: Legally blind  Arthritis  History of hypertension    PSH:No significant past surgical history      Allergies:No Known Allergies      Labs:                          13.3   8.19  )-----------( 250      ( 12 Jul 2019 14:48 )             41.9     WBC Trend  8.19 Date (07-12 @ 14:48)      Chem  07-12    139  |  105  |  13  ----------------------------<  92  4.7   |  28  |  1.12    Ca    8.9      12 Jul 2019 14:48            T(F): 99 (07-12-19 @ 15:39), Max: 99 (07-12-19 @ 15:39)  HR: 76 (07-12-19 @ 15:39) (64 - 76)  BP: 154/89 (07-12-19 @ 15:39) (154/89 - 185/93)  RR: 16 (07-12-19 @ 15:39) (16 - 16)  SpO2: 97% (07-12-19 @ 15:39) (96% - 97%)  Wt(kg): --    O:   General: Pleasant  female NAD & AOX3.    Integument:  Skin warm, dry and supple bilateral.    Ulceration Right medial malleolar ulcer, + hyperkeratotic border, wound base fibroGranular , wound size (1.0 cm X 0.9 cm X 0.5cm) - edema, + marce-wound erythema, - purulence, - fluctuance, - tracking/tunneling, - probe to bone.   Vascular: Dorsalis Pedis and Posterior Tibial pulses non-palpable.  Capillary re-fill time less then 3 seconds digits 1-5 bilateral.    Neuro: Protective sensation intact to the level of the digits bilateral.  MSK: Muscle strength 5/5 all major muscle groups bilateral.      A: Right ankle ulceration      P:   Chart reviewed and Patient evaluated  Discussed diagnosis and treatment with patient  Wound flush with normal saline  Obtained wound culture to be sent to Pathology  Applied dry sterile dressing  X-rays reviewed with no signs of OM  Continue with antibiotics  Ordered DEAN  Weight bearing as tolerated to the right foot  Offloading to bilateral Heels.   Podiatry will follow while in house.  Discussed with Attending Dr. Lara

## 2019-07-12 NOTE — ED ADULT NURSE NOTE - NSFALLRSKASSESSDT_ED_ALL_ED
After Visit Summary   2018    Kathy Light    MRN: [id number]           Patient Information     Date Of Birth          1946        Visit Information        Provider Department      2018 10:00 AM Rn, [facility]        Care Instructions    Preparing for Your Surgery      Name:  Kathy Light   MRN:  [id number]   :  1946   Today's Date:  2018     Arriving for surgery:  Surgery date:  18  Arrival time:  9 am    Please come to:   [facility]  [address] [place] [id number]    [employer] Parking is available in front of the Clinics and Surgery Center building from 5:30AM to 8:00PM.  -  Proceed to the 5th floor to check into the Ambulatory Surgery Center.              >> There will be patient concierges on the 1st and 5th floor, for assistance or an escort, if you would like.              >> Please call [phone] with any questions day of surgery.    -  Bring your ID and insurance card.    What can I eat or drink?  -  You may have solid food or milk products until 8 hours prior to your surgery. (Until 2:30 am )  -  You may have water, apple juice or 7up/Sprite until 2 hours prior to your surgery. (Until 8:30 am )    Which medicines can I take?       -  Do not bring your own medications to the hospital.        -  Follow Orthopedic Clinic instructions regarding Ibuprofen. If no instructions given, NO Ibuprofen the day prior to surgery.     -  Hold Naproxen 2 days prior to surgery.    -  Please take these medications the morning of surgery:  Acetaminophen (Tylenol) if needed    How do I prepare myself?  -  Take two showers: one the night before surgery; and one the morning of surgery.         Use Scrubcare or Hibiclens to wash from neck down.  You may use your own shampoo and conditioner. No other hair products.   -  Do NOT use lotion, powder, colognes, deodorant, or antiperspirant the day of your surgery.  -   Do NOT wear any jewelry.    Questions or Concerns:  If you have questions or concerns prior to your surgery, call [phone]. (Mon - Fri   8 am- 5:30 pm)  Questions after surgery, contact your Surgeons office.      AFTER YOUR SURGERY  Breathing exercises   Breathing exercises help you recover faster. Take deep breaths and let the air out slowly. This will:     Help you wake up after surgery.    Help prevent complications like pneumonia.  Preventing complications will help you go home sooner.   We may give you a breathing device (incentive spirometer) to encourage you to breathe deeply.   Nausea and vomiting   You may feel sick to your stomach after surgery; if so, let your nurse know.    Pain control:  After surgery, you may have pain. Our goal is to help you manage your pain. Pain medicine will help you feel comfortable enough to do activities that will help you heal.  These activities may include breathing exercises, walking and physical therapy.   To help your health care team treat your pain we will ask: 1) If you have pain  2) where it is located 3) describe your pain in your words  Methods of pain control include medications given by mouth, vein or by nerve block for some surgeries.  Sequential Compression Device (SCD) or Pneumo Boots:  You may need to wear SCD S on your legs or feet. These are wraps connected to a machine that pumps in air and releases it. The repeated pumping helps prevent blood clots from forming.                     Follow-ups after your visit        Your next 10 appointments already scheduled     Jul 26, 2018 10:00 AM CDT   (Arrive by 9:45 AM)   PAC RN ASSESSMENT with Johana Pac Rn   [facility] ([facility] [place])    [address]  4th [facility] [address] [phone]            Jul 26, 2018 10:30 AM CDT   (Arrive by 10:15 AM)   PAC Anesthesia Consult with Johana Pac Anesthesiologist   [facility] [place]  ([facility])    [address]  4th Floor  [facility] [id number] [phone]            Aug 02, 2018   Procedure with Mike Ashford MD   [facility] ([facility] [place])    [address]  5th Floor  [facility] [id number] [phone]           Located in the Clinics and Surgery Center at [address] [place] [address], [place] [address].   parking is very convenient and highly recommended.  is a $6 flat rate fee.  Both  and self parkers should enter the main arrival plaza from [facility]; parking attendants will direct you based on your parking preference.              Who to contact     Please call your clinic at [phone] to:    Ask questions about your health    Make or cancel appointments    Discuss your medicines    Learn about your test results    Speak to your doctor            Additional Information About Your Visit        [employer] Information     [employer] gives you secure access to your electronic health record. If you see a primary care provider, you can also send messages to your care team and make appointments. If you have questions, please call your primary care clinic.  If you do not have a primary care provider, please call [phone] and they will assist you.      [employer] is an electronic gateway that provides easy, online access to your medical records. With [employer], you can request a clinic appointment, read your test results, renew a prescription or communicate with your care team.     To access your existing account, please contact your [facility] or call [phone] for assistance.        Care EveryWhere ID     This is your Care EveryWhere ID. This could be used by other organizations to access your [place] medical records  [id number]         Blood Pressure from Last 3 Encounters:   07/26/18 131/80   07/20/18 122/60   06/14/18 135/74    Weight from  Last 3 Encounters:   07/26/18 71 kg (156 lb 9.6 oz)   07/20/18 71.4 kg (157 lb 6.4 oz)   06/14/18 70.8 kg (156 lb)              Today, you had the following     No orders found for display       Primary Care Provider Office Phone # Fax #    ZAIRE Pugh [phone]       [facility] [place] [address] [place] [address]        Equal Access to Services     JENIFER PIERCE : Hadii aad ku hadasho Soomaali, waaxda luqadaha, qaybta kaalmada adeegyada, waxay idiin hayaan adesusan kharaanatoly chester . So [facility] [phone].    ATENCIÓN: Si habla español, tiene a vásquez disposición servicios gratuitos de asistencia lingüística. Llame al [phone].    We comply with applicable federal civil rights laws and [place] laws. We do not discriminate on the basis of race, color, national origin, age, disability, sex, sexual orientation, or gender identity.            Thank you!     Thank you for choosing [facility]  for your care. Our goal is always to provide you with excellent care. Hearing back from our patients is one way we can continue to improve our services. Please take a few minutes to complete the written survey that you may receive in the mail after your visit with us. Thank you!             Your Updated Medication List - Protect others around you: Learn how to safely use, store and throw away your medicines at www.disposemymeds.org.          This list is accurate as of 7/26/18  9:49 AM.  Always use your most recent med list.                   Brand Name Dispense Instructions for use Diagnosis    gabapentin 300 MG capsule    NEURONTIN    60 capsule    Take 1 capsule (300 mg) by mouth nightly as needed    Numbness       naproxen 500 MG tablet    NAPROSYN    60 tablet    Take 1 tablet (500 mg) by mouth 2 times daily as needed for moderate pain (with food)    Right shoulder tendinitis, Tear of right supraspinatus tendon, initial encounter          12-Jul-2019 13:51

## 2019-07-12 NOTE — ED PROVIDER NOTE - OBJECTIVE STATEMENT
87y year old Female sent from Urgent care for draning Right ankle ulceration.  Pt is blind, has decreased ambulation secondary to hip pain due to arthritis, HTN.  Patient states having ulceration x days, but saw drainage yesterday when the skin broke.  She went to an urgent care for the treatment and was given antibiotics for it. patient denies any fever, nausea, vomiting, SOB, and chills.  The erythema and swelling have improved since starting antibiotics but Urgent care told her to see wound care doctor immediately.  Since the outpatient clinic they went to was closed they came to the ER  Pts granddaughter is also very concerned but her right hip pain is worsening limiting her already decreased ambulation.

## 2019-07-12 NOTE — ED PROVIDER NOTE - CLINICAL SUMMARY MEDICAL DECISION MAKING FREE TEXT BOX
ankle ulder improving with abx, podiatry consult and fu withpmd  granddaughter insists on CT hip bone, CT shows ? aneusrym, will order CTA

## 2019-07-12 NOTE — ED PROVIDER NOTE - SKIN, MLM
Right medial malleolar ulcer, + hyperkeratotic border, wound base fibroGranular , wound size (1.0 cm X 0.9 cm X 0.5cm) - edema, + marce-wound erythema, - purulence, - fluctuance, - tracking/tunneling, - probe to bone.

## 2019-07-12 NOTE — ED PROVIDER NOTE - PROGRESS NOTE DETAILS
Discussed CT results with pt and family. AS per family, they are aware that pt has breast ca, a lesion in the right kidney, and a lesion in the liver. Family states that pt has appropriate  f/u for her breast ca.

## 2019-07-13 LAB — CRP SERPL-MCNC: <0.1 MG/DL — SIGNIFICANT CHANGE UP (ref 0–0.4)

## 2019-07-14 LAB
-  AMIKACIN: SIGNIFICANT CHANGE UP
-  AMIKACIN: SIGNIFICANT CHANGE UP
-  AMPICILLIN/SULBACTAM: SIGNIFICANT CHANGE UP
-  AMPICILLIN/SULBACTAM: SIGNIFICANT CHANGE UP
-  AMPICILLIN: SIGNIFICANT CHANGE UP
-  AZTREONAM: SIGNIFICANT CHANGE UP
-  AZTREONAM: SIGNIFICANT CHANGE UP
-  CEFAZOLIN: SIGNIFICANT CHANGE UP
-  CEFAZOLIN: SIGNIFICANT CHANGE UP
-  CEFEPIME: SIGNIFICANT CHANGE UP
-  CEFEPIME: SIGNIFICANT CHANGE UP
-  CEFOXITIN: SIGNIFICANT CHANGE UP
-  CEFOXITIN: SIGNIFICANT CHANGE UP
-  CEFTRIAXONE: SIGNIFICANT CHANGE UP
-  CEFTRIAXONE: SIGNIFICANT CHANGE UP
-  CIPROFLOXACIN: SIGNIFICANT CHANGE UP
-  CIPROFLOXACIN: SIGNIFICANT CHANGE UP
-  ERTAPENEM: SIGNIFICANT CHANGE UP
-  ERTAPENEM: SIGNIFICANT CHANGE UP
-  GENTAMICIN: SIGNIFICANT CHANGE UP
-  GENTAMICIN: SIGNIFICANT CHANGE UP
-  IMIPENEM: SIGNIFICANT CHANGE UP
-  LEVOFLOXACIN: SIGNIFICANT CHANGE UP
-  LEVOFLOXACIN: SIGNIFICANT CHANGE UP
-  MEROPENEM: SIGNIFICANT CHANGE UP
-  MEROPENEM: SIGNIFICANT CHANGE UP
-  PIPERACILLIN/TAZOBACTAM: SIGNIFICANT CHANGE UP
-  PIPERACILLIN/TAZOBACTAM: SIGNIFICANT CHANGE UP
-  TETRACYCLINE: SIGNIFICANT CHANGE UP
-  TOBRAMYCIN: SIGNIFICANT CHANGE UP
-  TOBRAMYCIN: SIGNIFICANT CHANGE UP
-  TRIMETHOPRIM/SULFAMETHOXAZOLE: SIGNIFICANT CHANGE UP
-  TRIMETHOPRIM/SULFAMETHOXAZOLE: SIGNIFICANT CHANGE UP
-  VANCOMYCIN: SIGNIFICANT CHANGE UP
CULTURE RESULTS: SIGNIFICANT CHANGE UP
METHOD TYPE: SIGNIFICANT CHANGE UP
ORGANISM # SPEC MICROSCOPIC CNT: SIGNIFICANT CHANGE UP
SPECIMEN SOURCE: SIGNIFICANT CHANGE UP

## 2019-07-15 ENCOUNTER — APPOINTMENT (OUTPATIENT)
Dept: SURGICAL ONCOLOGY | Facility: CLINIC | Age: 84
End: 2019-07-15

## 2019-07-15 DIAGNOSIS — C50.911 MALIGNANT NEOPLASM OF UNSPECIFIED SITE OF RIGHT FEMALE BREAST: ICD-10-CM

## 2019-07-15 NOTE — REASON FOR VISIT
[Initial Consultation] : an initial consultation for [FreeTextEntry2] : NO SHOW FOR CONSULT 07-15-19 for Lymph node positive right breast cancer

## 2019-07-15 NOTE — HISTORY OF PRESENT ILLNESS
[de-identified] : 87-year-old lady being referred by her internist Dr. Reginald RAZO with a newly diagnosed LN+, RIGHT BREAST IDC on core needle biopsies of a palpable (5.3 cm) right breast mass, with an associated (3.4 cm) right axillary lymph node, which were characterized as BI-RADS 5 on imaging.  (Needle biopsies performed at [facility], pathology is from [facility]).\par ER/MI+, HER-2-negative.\par \par June 2019 bilateral mammogram and sonogram at [facility], performed to evaluate the above palpable abnormality: BI-RADS 5.\par The 5.3 cm lobulated right breast mass extended to the subareolar portion, associated with normal-appearing 3.4 cm right axillary node.\par \par She did not to keep an appointment @the [facility], since she had seen a medical oncologist at [place].......................................\par \par \par \par May 2019 CT chest through our system demonstrated:\par Lobulated right breast mass\par Right axillary adenopathy\par Indeterminate right renal mid pole lesion for which renal ultrasound is recommended............................................\par \par Has been under the care of our wound treatment center, since 2014, for an ulcer of the medial left ankle (Dr. Reji Hawkins).\par \par PMD: Dr Reginald RAZO\par \par Hypertension is treated with enalapril.\par Only other current medications the topical treatments for the above left ankle wound.

## 2019-07-15 NOTE — ASSESSMENT
[FreeTextEntry1] : A 2019 CT chest demonstrated no evidence of metastatic disease.\par Incidental right renal lesion require sonographic evaluation.................................................\par \par Extent of disease evaluation would be completed by a breast MRI, and bone scan.\par \par Consideration of preoperative systemic therapy versus operation first (given nature of the disease would require a modified radical mastectomy)............................................\par \par NO SHOW FOR CONSULT 07-15-19 for Lymph node positive right breast cancer

## 2019-07-15 NOTE — PHYSICAL EXAM
[Normal] : supple, no neck mass and thyroid not enlarged [Normal Neck Lymph Nodes] : normal neck lymph nodes  [Normal Supraclavicular Lymph Nodes] : normal supraclavicular lymph nodes [Normal Axillary Lymph Nodes] : normal axillary lymph nodes [Normal] : full range of motion and no deformities appreciated [de-identified] : Groins not Examined [de-identified] : Below [de-identified] : Below

## 2019-07-15 NOTE — REVIEW OF SYSTEMS
[Negative] : Endocrine [FreeTextEntry5] : Hypertension [de-identified] : Left ankle ulcer [FreeTextEntry1] : Right breast cancer

## 2019-07-17 ENCOUNTER — APPOINTMENT (OUTPATIENT)
Dept: WOUND CARE | Facility: CLINIC | Age: 84
End: 2019-07-17

## 2019-07-18 ENCOUNTER — EMERGENCY (EMERGENCY)
Facility: HOSPITAL | Age: 84
LOS: 1 days | Discharge: HOME | End: 2019-07-18
Attending: EMERGENCY MEDICINE
Payer: MEDICARE

## 2019-07-18 VITALS
DIASTOLIC BLOOD PRESSURE: 92 MMHG | SYSTOLIC BLOOD PRESSURE: 161 MMHG | HEART RATE: 61 BPM | RESPIRATION RATE: 20 BRPM | OXYGEN SATURATION: 97 % | HEIGHT: 72 IN | WEIGHT: 293 LBS | TEMPERATURE: 98 F

## 2019-07-18 VITALS
RESPIRATION RATE: 20 BRPM | SYSTOLIC BLOOD PRESSURE: 145 MMHG | TEMPERATURE: 98 F | DIASTOLIC BLOOD PRESSURE: 78 MMHG | HEART RATE: 65 BPM | OXYGEN SATURATION: 98 %

## 2019-07-18 LAB
ALBUMIN SERPL ELPH-MCNC: 3.7 G/DL — SIGNIFICANT CHANGE UP (ref 3.5–5)
ALP SERPL-CCNC: 134 U/L — HIGH (ref 40–120)
ALT FLD-CCNC: 20 U/L DA — SIGNIFICANT CHANGE UP (ref 10–60)
ANION GAP SERPL CALC-SCNC: 6 MMOL/L — SIGNIFICANT CHANGE UP (ref 5–17)
AST SERPL-CCNC: 19 U/L — SIGNIFICANT CHANGE UP (ref 10–40)
BASOPHILS # BLD AUTO: 0.08 K/UL — SIGNIFICANT CHANGE UP (ref 0–0.2)
BASOPHILS NFR BLD AUTO: 1 % — SIGNIFICANT CHANGE UP (ref 0–2)
BILIRUB SERPL-MCNC: 0.4 MG/DL — SIGNIFICANT CHANGE UP (ref 0.2–1.2)
BUN SERPL-MCNC: 17 MG/DL — SIGNIFICANT CHANGE UP (ref 7–18)
CALCIUM SERPL-MCNC: 8.8 MG/DL — SIGNIFICANT CHANGE UP (ref 8.4–10.5)
CHLORIDE SERPL-SCNC: 103 MMOL/L — SIGNIFICANT CHANGE UP (ref 96–108)
CO2 SERPL-SCNC: 28 MMOL/L — SIGNIFICANT CHANGE UP (ref 22–31)
CREAT SERPL-MCNC: 1.29 MG/DL — SIGNIFICANT CHANGE UP (ref 0.5–1.3)
CRP SERPL-MCNC: 0.13 MG/DL — SIGNIFICANT CHANGE UP (ref 0–0.4)
CULTURE RESULTS: SIGNIFICANT CHANGE UP
CULTURE RESULTS: SIGNIFICANT CHANGE UP
EOSINOPHIL # BLD AUTO: 2.05 K/UL — HIGH (ref 0–0.5)
EOSINOPHIL NFR BLD AUTO: 25 % — HIGH (ref 0–6)
ERYTHROCYTE [SEDIMENTATION RATE] IN BLOOD: 19 MM/HR — SIGNIFICANT CHANGE UP (ref 0–20)
GLUCOSE SERPL-MCNC: 115 MG/DL — HIGH (ref 70–99)
HCT VFR BLD CALC: 39.3 % — SIGNIFICANT CHANGE UP (ref 34.5–45)
HGB BLD-MCNC: 12.7 G/DL — SIGNIFICANT CHANGE UP (ref 11.5–15.5)
LACTATE SERPL-SCNC: 1.4 MMOL/L — SIGNIFICANT CHANGE UP (ref 0.7–2)
LYMPHOCYTES # BLD AUTO: 1.88 K/UL — SIGNIFICANT CHANGE UP (ref 1–3.3)
LYMPHOCYTES # BLD AUTO: 23 % — SIGNIFICANT CHANGE UP (ref 13–44)
MCHC RBC-ENTMCNC: 30.9 PG — SIGNIFICANT CHANGE UP (ref 27–34)
MCHC RBC-ENTMCNC: 32.3 GM/DL — SIGNIFICANT CHANGE UP (ref 32–36)
MCV RBC AUTO: 95.6 FL — SIGNIFICANT CHANGE UP (ref 80–100)
MONOCYTES # BLD AUTO: 0.41 K/UL — SIGNIFICANT CHANGE UP (ref 0–0.9)
MONOCYTES NFR BLD AUTO: 5 % — SIGNIFICANT CHANGE UP (ref 2–14)
NEUTROPHILS # BLD AUTO: 3.77 K/UL — SIGNIFICANT CHANGE UP (ref 1.8–7.4)
NEUTROPHILS NFR BLD AUTO: 46 % — SIGNIFICANT CHANGE UP (ref 43–77)
PLATELET # BLD AUTO: 270 K/UL — SIGNIFICANT CHANGE UP (ref 150–400)
POTASSIUM SERPL-MCNC: 4.4 MMOL/L — SIGNIFICANT CHANGE UP (ref 3.5–5.3)
POTASSIUM SERPL-SCNC: 4.4 MMOL/L — SIGNIFICANT CHANGE UP (ref 3.5–5.3)
PROT SERPL-MCNC: 7.7 G/DL — SIGNIFICANT CHANGE UP (ref 6–8.3)
RBC # BLD: 4.11 M/UL — SIGNIFICANT CHANGE UP (ref 3.8–5.2)
RBC # FLD: 12.5 % — SIGNIFICANT CHANGE UP (ref 10.3–14.5)
SODIUM SERPL-SCNC: 137 MMOL/L — SIGNIFICANT CHANGE UP (ref 135–145)
SPECIMEN SOURCE: SIGNIFICANT CHANGE UP
SPECIMEN SOURCE: SIGNIFICANT CHANGE UP
WBC # BLD: 8.19 K/UL — SIGNIFICANT CHANGE UP (ref 3.8–10.5)
WBC # FLD AUTO: 8.19 K/UL — SIGNIFICANT CHANGE UP (ref 3.8–10.5)

## 2019-07-18 PROCEDURE — 73610 X-RAY EXAM OF ANKLE: CPT

## 2019-07-18 PROCEDURE — 99284 EMERGENCY DEPT VISIT MOD MDM: CPT | Mod: 25

## 2019-07-18 PROCEDURE — 85652 RBC SED RATE AUTOMATED: CPT

## 2019-07-18 PROCEDURE — 83605 ASSAY OF LACTIC ACID: CPT

## 2019-07-18 PROCEDURE — 73610 X-RAY EXAM OF ANKLE: CPT | Mod: 26,RT

## 2019-07-18 PROCEDURE — 80053 COMPREHEN METABOLIC PANEL: CPT

## 2019-07-18 PROCEDURE — 86140 C-REACTIVE PROTEIN: CPT

## 2019-07-18 PROCEDURE — 36415 COLL VENOUS BLD VENIPUNCTURE: CPT

## 2019-07-18 PROCEDURE — 99284 EMERGENCY DEPT VISIT MOD MDM: CPT

## 2019-07-18 PROCEDURE — 85027 COMPLETE CBC AUTOMATED: CPT

## 2019-07-18 RX ORDER — HYDROGEN PEROXIDE 0.3 KG/100L
1 LIQUID TOPICAL ONCE
Refills: 0 | Status: COMPLETED | OUTPATIENT
Start: 2019-07-18 | End: 2019-07-18

## 2019-07-18 RX ADMIN — HYDROGEN PEROXIDE 1 APPLICATION(S): 0.3 LIQUID TOPICAL at 16:30

## 2019-07-18 NOTE — ED PROVIDER NOTE - SKIN, MLM
jright medial malleoli dime size circular wound with numerous maggots. no crepitus, no changes in skin color

## 2019-07-18 NOTE — ED PROVIDER NOTE - CLINICAL SUMMARY MEDICAL DECISION MAKING FREE TEXT BOX
87 yr old female with hx of HTN and old leg wound in past presents to ed after being sent in for maggots in right medial malleoli which was noted today by family.  no fever, no n/v, no chills.    medial ankle wound with maggots- labs, esr/crp, xr, podiatry, wound care.

## 2019-07-18 NOTE — ED PROVIDER NOTE - CARE PLAN
Principal Discharge DX:	Maggot infestation  Secondary Diagnosis:	Leg ulcer, right, with fat layer exposed

## 2019-07-18 NOTE — ED PROVIDER NOTE - OBJECTIVE STATEMENT
87 yr old female with hx of HTN and old leg wound in past presents to ed after being sent in for maggots in right medial malleoli which was noted today by family.  no fever, no n/v, no chills.

## 2019-07-18 NOTE — ED PROVIDER NOTE - PROGRESS NOTE DETAILS
Rodriguez: podiatry clean wound.  [address] [facility] on tuesday, wednesday, or thursday.  Recommend daily home dressing changes with Daikins wet to dry.  dx chronic leg ulcer.

## 2019-07-18 NOTE — CONSULT NOTE ADULT - SUBJECTIVE AND OBJECTIVE BOX
Patient is a 87y old  Female who presents with a chief complaint of     HPI: This 64 year old diabetic female presents to the ED with maggots in a wound on her leg.  Patient's daughters are present to help with story.  Patient has had wound for months, was in the ED last friday for pain in her hip and to have the wound looked at. Radiographs were taken.  No signs of bone involvement.  Wound was reported stable at the time.  Patient does not have a foot doctor.  Patient saw primary care doctor today.  Patient's family saw the maggots today and came to the ED. Patient denies N/V/D/C/SOB      PMH:Legally blind  Arthritis  History of hypertension    Allergies: No Known Allergies    Medications: hydrogen peroxide 3% Solution 1 Application(s) Topical Once    FH:  PSX: No significant past surgical history    SH: hydrogen peroxide 3% Solution 1 Application(s) Topical Once      Vital Signs Last 24 Hrs  T(C): 36.6 (18 Jul 2019 15:37), Max: 36.6 (18 Jul 2019 15:37)  T(F): 97.9 (18 Jul 2019 15:37), Max: 97.9 (18 Jul 2019 15:37)  HR: 61 (18 Jul 2019 15:37) (61 - 61)  BP: 161/92 (18 Jul 2019 15:37) (161/92 - 161/92)  BP(mean): --  RR: 20 (18 Jul 2019 15:37) (20 - 20)  SpO2: 97% (18 Jul 2019 15:37) (97% - 97%)    LABS                        12.7   8.19  )-----------( 270      ( 18 Jul 2019 16:34 )             39.3               07-18    137  |  103  |  17  ----------------------------<  115<H>  4.4   |  28  |  1.29    Ca    8.8      18 Jul 2019 16:34    TPro  7.7  /  Alb  3.7  /  TBili  0.4  /  DBili  x   /  AST  19  /  ALT  20  /  AlkPhos  134<H>  07-18      ROS  REVIEW OF SYSTEMS:    CONSTITUTIONAL: No fever, weight loss, or fatigue  EYES: No eye pain, visual disturbances, or discharge  ENMT:  No difficulty hearing, tinnitus, vertigo; No sinus or throat pain  NECK: No pain or stiffness  BREASTS: No pain, masses, or nipple discharge  RESPIRATORY: No cough, wheezing, chills or hemoptysis; No shortness of breath  CARDIOVASCULAR: No chest pain, palpitations, dizziness, or leg swelling  GASTROINTESTINAL: No abdominal or epigastric pain. No nausea, vomiting, or hematemesis; No diarrhea or constipation. No melena or hematochezia.  GENITOURINARY: No dysuria, frequency, hematuria, or incontinence  NEUROLOGICAL: No headaches, memory loss, loss of strength, numbness, or tremors  SKIN: Open lesion with maggots on right medial malleolus  LYMPH NODES: No enlarged glands  ENDOCRINE: No heat or cold intolerance; No hair loss  MUSCULOSKELETAL: Pain in right ankle and periwound  PSYCHIATRIC: No depression, anxiety, mood swings, or difficulty sleeping  HEME/LYMPH: No easy bruising, or bleeding gums  ALLERY AND IMMUNOLOGIC: No hives or eczema      PHYSICAL EXAM  GEN: NUNO LAST is a pleasant well-nourished, well developed 87y Female in no acute distress, alert awake, and oriented to person, place and time.   LE Focused:    Vasc:  Palpable pulses b/l.  CFT brisk to all digits b/l  Derm: Right medial malleolus wound measuring 1.2cm x 1.2cm x 0.5cm filled with maggots.  Red beefy granular wound edge with surrounding hyperkeratosis.  Fibrotic base.  No purulence, - probe to bone.  Neuro: unable to assess  MSK: pain with right ankle movement.  Pain with palpation periwound.    Imaging:   < from: [facility] Physiol Extremity Lower 3+ Level, BI (07.12.19 @ 18:39) >    EXAM:  US PHYSIOL LWR EXT 3+ LEV BI                            PROCEDURE DATE:  07/12/2019      INTERPRETATION:  Clinical indications: Lower extremity ulcer    Comparison: None    Findings: There are biphasic waveforms bilaterally. Waveforms are   irregular and dampened at the level of the metatarsals and digits. There   is no abnormal segmental pressure gradient.    Right DEAN = 1.22  Left DEAN = 1.21    Impression: Normal ABIs.    RUBEN TELLEZ M.D., ATTENDING RADIOLOGIST  Thisdocument has been electronically signed. Jul 12 2019  7:18PM      < end of copied text >    x-ray: pending      Cultures: Culture Results:   No growth at 5 days. (07-13 @ 02:09)  Culture Results:   No growth at 5 days. 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  Maggots picked out of wound base.  Dry sterile dressing placed.  Recommend daily home dressing changes with Daikins wet to dry.  Recommend patient to follow up with in clinic at [address] [place] [address] on tuesday, wednesday, or thursday.  Discussed with Dr. Lara

## 2019-07-18 NOTE — ED ADULT NURSE NOTE - NSIMPLEMENTINTERV_GEN_ALL_ED
Implemented All Fall with Harm Risk Interventions:  [place] to call system. Call bell, personal items and telephone within reach. Instruct patient to call for assistance. Room bathroom lighting operational. Non-slip footwear when patient is off stretcher. Physically safe environment: no spills, clutter or unnecessary equipment. Stretcher in lowest position, wheels locked, appropriate side rails in place. Provide visual cue, wrist band, yellow gown, etc. Monitor gait and stability. Monitor for mental status changes and reorient to person, place, and time. Review medications for side effects contributing to fall risk. Reinforce activity limits and safety measures with patient and family. Provide visual clues: red socks.

## 2019-07-30 ENCOUNTER — RESULT REVIEW (OUTPATIENT)
Age: 84
End: 2019-07-30

## 2019-07-30 ENCOUNTER — OUTPATIENT (OUTPATIENT)
Dept: OUTPATIENT SERVICES | Facility: HOSPITAL | Age: 84
LOS: 1 days | End: 2019-07-30
Payer: MEDICARE

## 2019-07-30 ENCOUNTER — APPOINTMENT (OUTPATIENT)
Dept: WOUND CARE | Facility: CLINIC | Age: 84
End: 2019-07-30

## 2019-07-30 VITALS
OXYGEN SATURATION: 99 % | TEMPERATURE: 98.8 F | HEIGHT: 70 IN | WEIGHT: 293 LBS | DIASTOLIC BLOOD PRESSURE: 72 MMHG | SYSTOLIC BLOOD PRESSURE: 146 MMHG | BODY MASS INDEX: 41.95 KG/M2 | HEART RATE: 52 BPM | RESPIRATION RATE: 18 BRPM

## 2019-07-30 DIAGNOSIS — L97.319 NON-PRESSURE CHRONIC ULCER OF RIGHT ANKLE WITH UNSPECIFIED SEVERITY: ICD-10-CM

## 2019-07-30 DIAGNOSIS — Z00.00 ENCOUNTER FOR GENERAL ADULT MEDICAL EXAMINATION WITHOUT ABNORMAL FINDINGS: ICD-10-CM

## 2019-07-30 PROCEDURE — 11042 DBRDMT SUBQ TIS 1ST 20SQCM/<: CPT

## 2019-07-30 RX ORDER — COLLAGENASE SANTYL 250 [ARB'U]/G
250 OINTMENT TOPICAL DAILY
Qty: 1 | Refills: 1 | Status: ACTIVE | COMMUNITY
Start: 2019-07-30 | End: 1900-01-01

## 2019-07-30 NOTE — PLAN
[FreeTextEntry1] : O: \par Vasc: DP 1/4 b/l PT non-palpable b/l, CFT<3x10, TG warm to warm\par Derm: wound measuring 1.0cmx1.3cmx0.5cm. -malodor -probe to bone, moderate drainage, -macerated borders on right medial malleolus\par Neuro: protective sensation intact to the level of the digits \par \par \par A: \par Ulcer medial malleolus right ankle \par \par P:\par Pt evaluated and chart created \par The patient was prepped in the usual aseptic manner and the foot was scrubbed with chlorhexadine\par Verbal consent was given for debridement\par Topical Lidocaine was applied to the wound and surrounding skin \par Debridement was performed with sterile 15 blade to the level of subcutaneous tissue. All necrotic and devitalized tissue was removed and undermining and any tunneling was excised.\par Biopsy was taken from the wound\par Applied Santyl, adaptic, 4x4 gauze, ABD pad, Kerlix, and ACE\par Pt and daughter gave verbal understanding \par Referral given for vascular appointment \par RTC 1 week for re-evaluation

## 2019-07-30 NOTE — HISTORY OF PRESENT ILLNESS
[FreeTextEntry1] : 87 yr old female pt presents to the clinic complaining of painful medial malleolus ulcer on the right ankle. Pt presents with her daughters in a wheel chair. Pt states shes had the ulcer for about 3-4 weeks. Pt states she has not seen a vascular doctor. Pt denies F/V/C/N/SOB.

## 2019-07-30 NOTE — PHYSICAL EXAM
[4 x 4] : 4 x 4  [FreeTextEntry1] : medial malleolus [de-identified] : victor manuel [FreeTextEntry2] : 1.0 [FreeTextEntry3] : 1.3cm [FreeTextEntry4] : 0.5cm [de-identified] : santyl [de-identified] : JENNA

## 2019-07-30 NOTE — PHYSICAL EXAM
[4 x 4] : 4 x 4  [FreeTextEntry1] : medial malleolus [de-identified] : victor manuel [FreeTextEntry2] : 1.0 [FreeTextEntry4] : 0.5cm [FreeTextEntry3] : 1.3cm [de-identified] : santyl [de-identified] : JENNA

## 2019-08-01 DIAGNOSIS — E11.621 TYPE 2 DIABETES MELLITUS WITH FOOT ULCER: ICD-10-CM

## 2019-08-01 DIAGNOSIS — L97.312 NON-PRESSURE CHRONIC ULCER OF RIGHT ANKLE WITH FAT LAYER EXPOSED: ICD-10-CM

## 2019-08-06 ENCOUNTER — APPOINTMENT (OUTPATIENT)
Dept: WOUND CARE | Facility: CLINIC | Age: 84
End: 2019-08-06

## 2019-08-06 ENCOUNTER — OUTPATIENT (OUTPATIENT)
Dept: OUTPATIENT SERVICES | Facility: HOSPITAL | Age: 84
LOS: 1 days | End: 2019-08-06
Payer: MEDICARE

## 2019-08-06 VITALS
SYSTOLIC BLOOD PRESSURE: 134 MMHG | HEIGHT: 70 IN | DIASTOLIC BLOOD PRESSURE: 81 MMHG | WEIGHT: 293 LBS | TEMPERATURE: 98.9 F | BODY MASS INDEX: 41.95 KG/M2 | HEART RATE: 64 BPM

## 2019-08-06 DIAGNOSIS — Z00.00 ENCOUNTER FOR GENERAL ADULT MEDICAL EXAMINATION WITHOUT ABNORMAL FINDINGS: ICD-10-CM

## 2019-08-06 LAB
BASOPHILS # BLD AUTO: 0.03 K/UL — SIGNIFICANT CHANGE UP (ref 0–0.2)
BASOPHILS NFR BLD AUTO: 0.4 % — SIGNIFICANT CHANGE UP (ref 0–2)
EOSINOPHIL # BLD AUTO: 1.62 K/UL — HIGH (ref 0–0.5)
EOSINOPHIL NFR BLD AUTO: 21.1 % — HIGH (ref 0–6)
HCT VFR BLD CALC: 40.6 % — SIGNIFICANT CHANGE UP (ref 34.5–45)
HGB BLD-MCNC: 13 G/DL — SIGNIFICANT CHANGE UP (ref 11.5–15.5)
IMM GRANULOCYTES NFR BLD AUTO: 0.3 % — SIGNIFICANT CHANGE UP (ref 0–1.5)
LYMPHOCYTES # BLD AUTO: 1.92 K/UL — SIGNIFICANT CHANGE UP (ref 1–3.3)
LYMPHOCYTES # BLD AUTO: 25 % — SIGNIFICANT CHANGE UP (ref 13–44)
MANUAL SMEAR VERIFICATION: SIGNIFICANT CHANGE UP
MCHC RBC-ENTMCNC: 30.6 PG — SIGNIFICANT CHANGE UP (ref 27–34)
MCHC RBC-ENTMCNC: 32 GM/DL — SIGNIFICANT CHANGE UP (ref 32–36)
MCV RBC AUTO: 95.5 FL — SIGNIFICANT CHANGE UP (ref 80–100)
MONOCYTES # BLD AUTO: 0.54 K/UL — SIGNIFICANT CHANGE UP (ref 0–0.9)
MONOCYTES NFR BLD AUTO: 7 % — SIGNIFICANT CHANGE UP (ref 2–14)
NEUTROPHILS # BLD AUTO: 3.55 K/UL — SIGNIFICANT CHANGE UP (ref 1.8–7.4)
NEUTROPHILS NFR BLD AUTO: 46.2 % — SIGNIFICANT CHANGE UP (ref 43–77)
PLAT MORPH BLD: NORMAL — SIGNIFICANT CHANGE UP
PLATELET # BLD AUTO: 308 K/UL — SIGNIFICANT CHANGE UP (ref 150–400)
RBC # BLD: 4.25 M/UL — SIGNIFICANT CHANGE UP (ref 3.8–5.2)
RBC # FLD: 13 % — SIGNIFICANT CHANGE UP (ref 10.3–14.5)
RBC BLD AUTO: NORMAL — SIGNIFICANT CHANGE UP
WBC # BLD: 7.68 K/UL — SIGNIFICANT CHANGE UP (ref 3.8–10.5)
WBC # FLD AUTO: 7.68 K/UL — SIGNIFICANT CHANGE UP (ref 3.8–10.5)

## 2019-08-06 PROCEDURE — 85652 RBC SED RATE AUTOMATED: CPT

## 2019-08-06 PROCEDURE — 11042 DBRDMT SUBQ TIS 1ST 20SQCM/<: CPT

## 2019-08-06 PROCEDURE — 86140 C-REACTIVE PROTEIN: CPT

## 2019-08-06 PROCEDURE — 80053 COMPREHEN METABOLIC PANEL: CPT

## 2019-08-06 PROCEDURE — 85027 COMPLETE CBC AUTOMATED: CPT

## 2019-08-06 NOTE — PLAN
[FreeTextEntry1] : O: \par Vasc: DP 1/4 b/l PT non-palpable b/l, CFT<3x10, TG warm to warm\par Derm: wound measuring 1.0cmx1.3cmx0.5cm. -malodor -probe to bone, moderate drainage, -macerated borders on right medial malleolus\par Neuro: protective sensation intact to the level of the digits \par \par \par A: \par Ulcer medial malleolus right ankle \par \par P:\par Pt evaluated and chart created \par The patient was prepped in the usual aseptic manner and the foot was scrubbed with chlorhexadine\par Verbal consent was given for debridement\par Topical Lidocaine was applied to the wound and surrounding skin \par Debridement was performed with sterile 15 blade to the level of subcutaneous tissue. All necrotic and devitalized tissue was removed and undermining and any tunneling was excised.\par Biopsy was taken from the wound - awaiting biopsy results \par Applied Santyl, adaptic, 4x4 gauze, ABD pad, Kerlix, and ACE\par Rx: CBC, CMP, ESR, CRP \par Pt and daughter gave verbal understanding \par Referral given for vascular appointment \par RTC 1 week for re-evaluation

## 2019-08-06 NOTE — PHYSICAL EXAM
[4 x 4] : 4 x 4  [FreeTextEntry1] : medial malleolus [de-identified] : victor manuel [FreeTextEntry3] : 1.3cm [FreeTextEntry2] : 1.0 [de-identified] : santyl [FreeTextEntry4] : 0.5cm [de-identified] : JENNA

## 2019-08-07 LAB
ALBUMIN SERPL ELPH-MCNC: 4.4 G/DL — SIGNIFICANT CHANGE UP (ref 3.3–5)
ALP SERPL-CCNC: 144 U/L — HIGH (ref 40–120)
ALT FLD-CCNC: 9 U/L — LOW (ref 10–45)
ANION GAP SERPL CALC-SCNC: 14 MMOL/L — SIGNIFICANT CHANGE UP (ref 5–17)
AST SERPL-CCNC: 16 U/L — SIGNIFICANT CHANGE UP (ref 10–40)
BILIRUB SERPL-MCNC: 0.2 MG/DL — SIGNIFICANT CHANGE UP (ref 0.2–1.2)
BUN SERPL-MCNC: 13 MG/DL — SIGNIFICANT CHANGE UP (ref 7–23)
CALCIUM SERPL-MCNC: 9.8 MG/DL — SIGNIFICANT CHANGE UP (ref 8.4–10.5)
CHLORIDE SERPL-SCNC: 100 MMOL/L — SIGNIFICANT CHANGE UP (ref 96–108)
CO2 SERPL-SCNC: 24 MMOL/L — SIGNIFICANT CHANGE UP (ref 22–31)
CREAT SERPL-MCNC: 1.33 MG/DL — HIGH (ref 0.5–1.3)
CRP SERPL-MCNC: 0.27 MG/DL — SIGNIFICANT CHANGE UP (ref 0–0.4)
ERYTHROCYTE [SEDIMENTATION RATE] IN BLOOD: 47 MM/HR — HIGH (ref 0–20)
GLUCOSE SERPL-MCNC: 92 MG/DL — SIGNIFICANT CHANGE UP (ref 70–99)
POTASSIUM SERPL-MCNC: 4.5 MMOL/L — SIGNIFICANT CHANGE UP (ref 3.5–5.3)
POTASSIUM SERPL-SCNC: 4.5 MMOL/L — SIGNIFICANT CHANGE UP (ref 3.5–5.3)
PROT SERPL-MCNC: 7.8 G/DL — SIGNIFICANT CHANGE UP (ref 6–8.3)
SODIUM SERPL-SCNC: 138 MMOL/L — SIGNIFICANT CHANGE UP (ref 135–145)

## 2019-08-09 DIAGNOSIS — L97.312 NON-PRESSURE CHRONIC ULCER OF RIGHT ANKLE WITH FAT LAYER EXPOSED: ICD-10-CM

## 2019-08-09 DIAGNOSIS — I73.9 PERIPHERAL VASCULAR DISEASE, UNSPECIFIED: ICD-10-CM

## 2019-08-13 ENCOUNTER — OUTPATIENT (OUTPATIENT)
Dept: OUTPATIENT SERVICES | Facility: HOSPITAL | Age: 84
LOS: 1 days | End: 2019-08-13
Payer: MEDICARE

## 2019-08-13 ENCOUNTER — APPOINTMENT (OUTPATIENT)
Dept: WOUND CARE | Facility: CLINIC | Age: 84
End: 2019-08-13

## 2019-08-13 VITALS
TEMPERATURE: 98.8 F | HEART RATE: 62 BPM | HEIGHT: 70 IN | RESPIRATION RATE: 18 BRPM | WEIGHT: 293 LBS | DIASTOLIC BLOOD PRESSURE: 80 MMHG | SYSTOLIC BLOOD PRESSURE: 131 MMHG | BODY MASS INDEX: 41.95 KG/M2

## 2019-08-13 DIAGNOSIS — Z00.00 ENCOUNTER FOR GENERAL ADULT MEDICAL EXAMINATION WITHOUT ABNORMAL FINDINGS: ICD-10-CM

## 2019-08-13 PROCEDURE — 11042 DBRDMT SUBQ TIS 1ST 20SQCM/<: CPT

## 2019-08-13 NOTE — PHYSICAL EXAM
[4 x 4] : 4 x 4  [FreeTextEntry1] : medial malleolus [de-identified] : victor manuel [FreeTextEntry2] : 1.8 [FreeTextEntry3] : 1.5cm [FreeTextEntry4] : 0.5cm [de-identified] : JENNA [de-identified] : santyl

## 2019-08-13 NOTE — PLAN
[FreeTextEntry1] : O: \par Vasc: DP 1/4 b/l PT non-palpable b/l, CFT<3x10, TG warm to warm\par Derm: wound as described above -malodor -probe to bone, moderate drainage, -macerated borders on right medial malleolus\par Neuro: protective sensation intact to the level of the digits \par \par \par A: \par Ulcer medial malleolus right ankle \par \par P:\par Pt evaluated and chart created \par The patient was prepped in the usual aseptic manner and the foot was scrubbed with chlorhexadine\par Verbal consent was given for debridement\par Topical Lidocaine was applied to the wound and surrounding skin \par Debridement was performed with sterile 15 blade to the level of subcutaneous tissue. All necrotic and devitalized tissue was removed and undermining and any tunneling was excised.\par Biopsy was taken from the wound - fibropurulent exudate\par Applied Santyl, adaptic, 4x4 gauze, ABD pad, Kerlix, and ACE\par reviewed CBC, CMP, ESR, CRP, within normal limits\par Pt and daughter gave verbal understanding \par Referral given for vascular appointment \par RTC 1 week for re-evaluation

## 2019-08-13 NOTE — HISTORY OF PRESENT ILLNESS
[FreeTextEntry1] : 87 yr old female pt presents to the clinic complaining of painful medial malleolus ulcer on the right ankle. Pt presents with her daughters in a wheel chair. Pt states shes had the ulcer for months. Pt states she has not seen a vascular doctor. Pt denies F/V/C/N/SOB. pt states an improvement in pain

## 2019-08-15 DIAGNOSIS — L97.312 NON-PRESSURE CHRONIC ULCER OF RIGHT ANKLE WITH FAT LAYER EXPOSED: ICD-10-CM

## 2019-08-15 DIAGNOSIS — E11.621 TYPE 2 DIABETES MELLITUS WITH FOOT ULCER: ICD-10-CM

## 2019-08-20 ENCOUNTER — APPOINTMENT (OUTPATIENT)
Dept: WOUND CARE | Facility: CLINIC | Age: 84
End: 2019-08-20

## 2019-08-20 ENCOUNTER — OUTPATIENT (OUTPATIENT)
Dept: OUTPATIENT SERVICES | Facility: HOSPITAL | Age: 84
LOS: 1 days | End: 2019-08-20
Payer: MEDICARE

## 2019-08-20 VITALS
DIASTOLIC BLOOD PRESSURE: 74 MMHG | SYSTOLIC BLOOD PRESSURE: 136 MMHG | HEIGHT: 70 IN | TEMPERATURE: 98.3 F | HEART RATE: 56 BPM

## 2019-08-20 DIAGNOSIS — Z00.00 ENCOUNTER FOR GENERAL ADULT MEDICAL EXAMINATION WITHOUT ABNORMAL FINDINGS: ICD-10-CM

## 2019-08-20 PROCEDURE — 29580 STRAPPING UNNA BOOT: CPT

## 2019-08-20 PROCEDURE — G0463: CPT

## 2019-08-20 NOTE — PHYSICAL EXAM
[4 x 4] : 4 x 4  [FreeTextEntry1] : medial malleolus [FreeTextEntry2] : 1.8 [FreeTextEntry3] : 1.5cm [de-identified] : victor manuel [de-identified] : santyl [FreeTextEntry4] : 0.5cm [de-identified] : JENNA

## 2019-08-20 NOTE — PLAN
[FreeTextEntry1] : O: \par Vasc: DP 1/4 b/l PT non-palpable b/l, CFT<3x10, TG warm to warm\par Derm: wound as described above -malodor -probe to bone, moderate drainage, -macerated borders on right medial malleolus\par Neuro: protective sensation intact to the level of the digits \par \par \par A: \par Ulcer medial malleolus right ankle \par \par P:\par Pt evaluated and chart created \par The patient was prepped in the usual aseptic manner and the foot was scrubbed with chlorhexadine\par Verbal consent was given for debridement\par Topical Lidocaine was applied to the wound and surrounding skin \par Debridement was performed with sterile 15 blade to the level of subcutaneous tissue. All necrotic and devitalized tissue was removed and undermining and any tunneling was excised.\par Biopsy was taken from the wound - fibropurulent exudate\par Applied Santyl, adaptic, 4x4 guaze, dynaflex\par Dressing to be C/D/I for 1 week \par reviewed CBC, CMP, ESR, CRP, within normal limits\par Pt and daughter gave verbal understanding \par Referral given for vascular appointment - appointment made for the end of the month \par RTC 1 week for re-evaluation

## 2019-08-23 DIAGNOSIS — L97.312 NON-PRESSURE CHRONIC ULCER OF RIGHT ANKLE WITH FAT LAYER EXPOSED: ICD-10-CM

## 2019-08-27 ENCOUNTER — APPOINTMENT (OUTPATIENT)
Dept: WOUND CARE | Facility: CLINIC | Age: 84
End: 2019-08-27

## 2019-08-27 ENCOUNTER — OUTPATIENT (OUTPATIENT)
Dept: OUTPATIENT SERVICES | Facility: HOSPITAL | Age: 84
LOS: 1 days | End: 2019-08-27
Payer: MEDICARE

## 2019-08-27 VITALS
WEIGHT: 293 LBS | TEMPERATURE: 99 F | BODY MASS INDEX: 41.95 KG/M2 | HEIGHT: 70 IN | SYSTOLIC BLOOD PRESSURE: 170 MMHG | HEART RATE: 50 BPM | DIASTOLIC BLOOD PRESSURE: 75 MMHG

## 2019-08-27 DIAGNOSIS — Z00.00 ENCOUNTER FOR GENERAL ADULT MEDICAL EXAMINATION WITHOUT ABNORMAL FINDINGS: ICD-10-CM

## 2019-08-27 PROCEDURE — 29580 STRAPPING UNNA BOOT: CPT

## 2019-08-27 NOTE — PHYSICAL EXAM
[4 x 4] : 4 x 4  [FreeTextEntry1] : medial malleolus [de-identified] : victor manuel [FreeTextEntry4] : 0.5cm [FreeTextEntry3] : 1.5cm [FreeTextEntry2] : 2.9 [de-identified] : JENNA [de-identified] : santyl [TWNoteComboBox1] : Right [TWNoteComboBox2] : 2 [TWNoteComboBox4] : Small [TWNoteComboBox6] : Diabetic [TWNoteComboBox5] : Yes [de-identified] : 100% [de-identified] : <20% [de-identified] : None [de-identified] : Yes [TWNoteComboBox7] : Melissa [de-identified] : Debridement performed of all devitalized tissue to bleeding viable tissue

## 2019-08-28 DIAGNOSIS — L97.312 NON-PRESSURE CHRONIC ULCER OF RIGHT ANKLE WITH FAT LAYER EXPOSED: ICD-10-CM

## 2019-08-30 ENCOUNTER — APPOINTMENT (OUTPATIENT)
Dept: VASCULAR SURGERY | Facility: CLINIC | Age: 84
End: 2019-08-30
Payer: MEDICARE

## 2019-08-30 ENCOUNTER — MED ADMIN CHARGE (OUTPATIENT)
Age: 84
End: 2019-08-30

## 2019-08-30 VITALS — HEART RATE: 58 BPM | SYSTOLIC BLOOD PRESSURE: 120 MMHG | DIASTOLIC BLOOD PRESSURE: 78 MMHG

## 2019-08-30 DIAGNOSIS — Z86.79 PERSONAL HISTORY OF OTHER DISEASES OF THE CIRCULATORY SYSTEM: ICD-10-CM

## 2019-08-30 DIAGNOSIS — R73.03 PREDIABETES.: ICD-10-CM

## 2019-08-30 DIAGNOSIS — Z82.49 FAMILY HISTORY OF ISCHEMIC HEART DISEASE AND OTHER DISEASES OF THE CIRCULATORY SYSTEM: ICD-10-CM

## 2019-08-30 DIAGNOSIS — Z78.9 OTHER SPECIFIED HEALTH STATUS: ICD-10-CM

## 2019-08-30 DIAGNOSIS — Z86.39 PERSONAL HISTORY OF OTHER ENDOCRINE, NUTRITIONAL AND METABOLIC DISEASE: ICD-10-CM

## 2019-08-30 DIAGNOSIS — Z85.3 PERSONAL HISTORY OF MALIGNANT NEOPLASM OF BREAST: ICD-10-CM

## 2019-08-30 PROCEDURE — 93923 UPR/LXTR ART STDY 3+ LVLS: CPT

## 2019-08-30 PROCEDURE — 29580 STRAPPING UNNA BOOT: CPT | Mod: RT

## 2019-08-30 PROCEDURE — 99204 OFFICE O/P NEW MOD 45 MIN: CPT | Mod: 25

## 2019-08-30 RX ORDER — SIMVASTATIN 40 MG/1
TABLET, FILM COATED ORAL
Refills: 0 | Status: ACTIVE | COMMUNITY

## 2019-08-30 RX ORDER — ANASTROZOLE TABLETS 1 MG/1
1 TABLET ORAL
Refills: 0 | Status: ACTIVE | COMMUNITY

## 2019-08-30 RX ORDER — CHROMIUM 200 MCG
TABLET ORAL
Refills: 0 | Status: ACTIVE | COMMUNITY

## 2019-08-30 NOTE — ASSESSMENT
[FreeTextEntry1] : Patient with right medial ankle ulceration. No evidence of significant arterial insufficiency. Continue local wound care. Plan for venous duplex to rule out insufficiency.

## 2019-08-30 NOTE — PHYSICAL EXAM
[JVD] : no jugular venous distention  [Normal Breath Sounds] : Normal breath sounds [Normal Heart Sounds] : normal heart sounds [0] : right 0 [2+] : left 2+ [Ankle Swelling (On Exam)] : not present [Varicose Veins Of Lower Extremities] : bilaterally [Ankle Swelling On The Right] : mild [] : bilaterally [Abdomen Masses] : No abdominal masses [Ankle Swelling On The Left] : moderate [Skin Ulcer] : ulcer [Oriented to Place] : oriented to place

## 2019-08-30 NOTE — HISTORY OF PRESENT ILLNESS
[FreeTextEntry1] : Patient is an 87-year-old female with past medical history significant for hypertension diabetes, legally blind, limited mobility, obesity presenting to us with right medial ankle ulceration for the past 4-6 weeks. No history of DVT. No complain of rest pain or claudication. Patient currently receiving local wound care with improvement and healing of the ulcer. No fevers or chills.

## 2019-09-03 ENCOUNTER — APPOINTMENT (OUTPATIENT)
Dept: WOUND CARE | Facility: CLINIC | Age: 84
End: 2019-09-03

## 2019-09-03 ENCOUNTER — OUTPATIENT (OUTPATIENT)
Dept: OUTPATIENT SERVICES | Facility: HOSPITAL | Age: 84
LOS: 1 days | End: 2019-09-03
Payer: MEDICARE

## 2019-09-03 DIAGNOSIS — Z00.00 ENCOUNTER FOR GENERAL ADULT MEDICAL EXAMINATION WITHOUT ABNORMAL FINDINGS: ICD-10-CM

## 2019-09-03 PROCEDURE — 11042 DBRDMT SUBQ TIS 1ST 20SQCM/<: CPT

## 2019-09-03 PROCEDURE — 29580 STRAPPING UNNA BOOT: CPT

## 2019-09-04 DIAGNOSIS — E11.621 TYPE 2 DIABETES MELLITUS WITH FOOT ULCER: ICD-10-CM

## 2019-09-04 DIAGNOSIS — L97.412 NON-PRESSURE CHRONIC ULCER OF RIGHT HEEL AND MIDFOOT WITH FAT LAYER EXPOSED: ICD-10-CM

## 2019-09-05 NOTE — HISTORY OF PRESENT ILLNESS
[FreeTextEntry1] : 87 yr old female pt presents to the clinic complaining of painful medial malleolus ulcer on the right ankle. Pt presents with her daughters in a wheel chair. Pt states shes had the ulcer for months. Pt states she has not seen a vascular doctor. Pt denies F/V/C/N/SOB. pt states an improvement in pain.

## 2019-09-05 NOTE — PHYSICAL EXAM
[4 x 4] : 4 x 4  [FreeTextEntry1] : medial malleolus [FreeTextEntry2] : 3.0 [de-identified] : victor manuel [FreeTextEntry3] : 1.5cm [FreeTextEntry4] : 0.5cm [TWNoteComboBox1] : Right [de-identified] : JENNA [de-identified] : santyl [TWNoteComboBox4] : Small [TWNoteComboBox2] : 2 [de-identified] : None [TWNoteComboBox6] : Diabetic [TWNoteComboBox5] : Yes [de-identified] : <20% [de-identified] : 100% [de-identified] : Yes [TWNoteComboBox7] : Melissa [de-identified] : Debridement performed of all devitalized tissue to bleeding viable tissue

## 2019-09-05 NOTE — PLAN
[FreeTextEntry1] : O: \par Vasc: DP 1/4 b/l PT non-palpable b/l, CFT<3x10, TG warm to warm\par Derm: wound as described above -malodor -probe to bone, moderate drainage, -macerated borders on right medial malleolus\par Neuro: protective sensation intact to the level of the digits \par \par \par A: \par Ulcer medial malleolus right ankle \par \par P:\par Pt evaluated and chart created \par The patient was prepped in the usual aseptic manner and the foot was scrubbed with chlorhexadine\par Verbal consent was given for debridement\par Topical Lidocaine was applied to the wound and surrounding skin \par Debridement was performed with sterile 15 blade to the level of subcutaneous tissue. All necrotic and devitalized tissue was removed and undermining and any tunneling was excised.\par Biopsy was taken from the wound - fibropurulent exudate\par Applied uuna boot\par Dressing to be C/D/I for 1 week \par reviewed CBC, CMP, ESR, CRP, within normal limits\par Pt and daughter gave verbal understanding \par Vascular follow up appointment in 1 week for venous duplex\par EO2 was offered to the patient.\par RTC 1 week for re-evaluation

## 2019-09-10 ENCOUNTER — APPOINTMENT (OUTPATIENT)
Dept: WOUND CARE | Facility: CLINIC | Age: 84
End: 2019-09-10

## 2019-09-10 ENCOUNTER — OUTPATIENT (OUTPATIENT)
Dept: OUTPATIENT SERVICES | Facility: HOSPITAL | Age: 84
LOS: 1 days | End: 2019-09-10
Payer: MEDICARE

## 2019-09-10 VITALS
RESPIRATION RATE: 18 BRPM | HEIGHT: 70 IN | OXYGEN SATURATION: 99 % | TEMPERATURE: 98 F | HEART RATE: 86 BPM | DIASTOLIC BLOOD PRESSURE: 71 MMHG | WEIGHT: 293 LBS | BODY MASS INDEX: 41.95 KG/M2 | SYSTOLIC BLOOD PRESSURE: 125 MMHG

## 2019-09-10 DIAGNOSIS — Z00.00 ENCOUNTER FOR GENERAL ADULT MEDICAL EXAMINATION WITHOUT ABNORMAL FINDINGS: ICD-10-CM

## 2019-09-10 PROCEDURE — 29580 STRAPPING UNNA BOOT: CPT

## 2019-09-10 PROCEDURE — 11042 DBRDMT SUBQ TIS 1ST 20SQCM/<: CPT

## 2019-09-10 NOTE — PHYSICAL EXAM
[4 x 4] : 4 x 4  [de-identified] : victor manuel [FreeTextEntry1] : medial malleolus [FreeTextEntry2] : 3.0 [FreeTextEntry3] : 1.5cm [de-identified] : santyl [FreeTextEntry4] : 0.5cm [de-identified] : JENNA [TWNoteComboBox1] : Right [TWNoteComboBox2] : 2 [TWNoteComboBox4] : Small [TWNoteComboBox5] : Yes [TWNoteComboBox6] : Diabetic [de-identified] : None [de-identified] : 100% [de-identified] : <20% [de-identified] : Yes [TWNoteComboBox7] : Melissa [de-identified] : Debridement performed of all devitalized tissue to bleeding viable tissue

## 2019-09-10 NOTE — PLAN
[FreeTextEntry1] : O: \par Vasc: DP 1/4 b/l PT non-palpable b/l, CFT<3x10, TG warm to warm\par Derm: wound as described above -malodor -probe to bone, moderate drainage, -macerated borders on right medial malleolus\par Neuro: protective sensation intact to the level of the digits \par \par \par A: \par Ulcer medial malleolus right ankle \par \par P:\par Pt evaluated and chart created \par The patient was prepped in the usual aseptic manner and the foot was scrubbed with chlorhexadine\par Verbal consent was given for debridement\par Topical Lidocaine was applied to the wound and surrounding skin \par Excional Debridement was performed with sterile 15 blade to the level of subcutaneous tissue. All necrotic and devitalized tissue was removed and undermining and any tunneling was excised.\par Biopsy was taken from the wound - fibropurulent exudate\par Applied unna boot\par Dressing to be C/D/I for 1 week \par reviewed CBC, CMP, ESR, CRP, within normal limits\par Pt and daughter gave verbal understanding \par Vascular follow up appointment in 1 week for venous duplex\par EO2 was offered to the patient.\par RTC 1 week for re-evaluation

## 2019-09-11 DIAGNOSIS — E11.621 TYPE 2 DIABETES MELLITUS WITH FOOT ULCER: ICD-10-CM

## 2019-09-11 DIAGNOSIS — L97.412 NON-PRESSURE CHRONIC ULCER OF RIGHT HEEL AND MIDFOOT WITH FAT LAYER EXPOSED: ICD-10-CM

## 2019-09-13 ENCOUNTER — EMERGENCY (EMERGENCY)
Facility: HOSPITAL | Age: 84
LOS: 1 days | Discharge: HOME | End: 2019-09-13
Attending: STUDENT IN AN ORGANIZED HEALTH CARE EDUCATION/TRAINING PROGRAM
Payer: MEDICARE

## 2019-09-13 VITALS
HEIGHT: 69 IN | RESPIRATION RATE: 18 BRPM | SYSTOLIC BLOOD PRESSURE: 133 MMHG | TEMPERATURE: 98 F | HEART RATE: 74 BPM | DIASTOLIC BLOOD PRESSURE: 81 MMHG | WEIGHT: 121.92 LBS | OXYGEN SATURATION: 98 %

## 2019-09-13 VITALS
WEIGHT: 293 LBS | SYSTOLIC BLOOD PRESSURE: 156 MMHG | OXYGEN SATURATION: 97 % | DIASTOLIC BLOOD PRESSURE: 89 MMHG | RESPIRATION RATE: 18 BRPM | HEART RATE: 83 BPM | HEIGHT: 69 IN | TEMPERATURE: 98 F

## 2019-09-13 LAB
ALBUMIN SERPL ELPH-MCNC: 3.4 G/DL — LOW (ref 3.5–5)
ALP SERPL-CCNC: 135 U/L — HIGH (ref 40–120)
ALT FLD-CCNC: 17 U/L DA — SIGNIFICANT CHANGE UP (ref 10–60)
ANION GAP SERPL CALC-SCNC: 4 MMOL/L — LOW (ref 5–17)
AST SERPL-CCNC: 16 U/L — SIGNIFICANT CHANGE UP (ref 10–40)
BASOPHILS # BLD AUTO: 0.04 K/UL — SIGNIFICANT CHANGE UP (ref 0–0.2)
BASOPHILS NFR BLD AUTO: 0.4 % — SIGNIFICANT CHANGE UP (ref 0–2)
BILIRUB SERPL-MCNC: 0.6 MG/DL — SIGNIFICANT CHANGE UP (ref 0.2–1.2)
BUN SERPL-MCNC: 16 MG/DL — SIGNIFICANT CHANGE UP (ref 7–18)
CALCIUM SERPL-MCNC: 9.3 MG/DL — SIGNIFICANT CHANGE UP (ref 8.4–10.5)
CHLORIDE SERPL-SCNC: 102 MMOL/L — SIGNIFICANT CHANGE UP (ref 96–108)
CO2 SERPL-SCNC: 30 MMOL/L — SIGNIFICANT CHANGE UP (ref 22–31)
CREAT SERPL-MCNC: 1.17 MG/DL — SIGNIFICANT CHANGE UP (ref 0.5–1.3)
EOSINOPHIL # BLD AUTO: 1.78 K/UL — HIGH (ref 0–0.5)
EOSINOPHIL NFR BLD AUTO: 15.8 % — HIGH (ref 0–6)
GLUCOSE SERPL-MCNC: 114 MG/DL — HIGH (ref 70–99)
HCT VFR BLD CALC: 38.6 % — SIGNIFICANT CHANGE UP (ref 34.5–45)
HGB BLD-MCNC: 12.4 G/DL — SIGNIFICANT CHANGE UP (ref 11.5–15.5)
IMM GRANULOCYTES NFR BLD AUTO: 0.3 % — SIGNIFICANT CHANGE UP (ref 0–1.5)
LYMPHOCYTES # BLD AUTO: 1.54 K/UL — SIGNIFICANT CHANGE UP (ref 1–3.3)
LYMPHOCYTES # BLD AUTO: 13.7 % — SIGNIFICANT CHANGE UP (ref 13–44)
MCHC RBC-ENTMCNC: 30.6 PG — SIGNIFICANT CHANGE UP (ref 27–34)
MCHC RBC-ENTMCNC: 32.1 GM/DL — SIGNIFICANT CHANGE UP (ref 32–36)
MCV RBC AUTO: 95.3 FL — SIGNIFICANT CHANGE UP (ref 80–100)
MONOCYTES # BLD AUTO: 0.8 K/UL — SIGNIFICANT CHANGE UP (ref 0–0.9)
MONOCYTES NFR BLD AUTO: 7.1 % — SIGNIFICANT CHANGE UP (ref 2–14)
NEUTROPHILS # BLD AUTO: 7.08 K/UL — SIGNIFICANT CHANGE UP (ref 1.8–7.4)
NEUTROPHILS NFR BLD AUTO: 62.7 % — SIGNIFICANT CHANGE UP (ref 43–77)
NRBC # BLD: 0 /100 WBCS — SIGNIFICANT CHANGE UP (ref 0–0)
PLATELET # BLD AUTO: 292 K/UL — SIGNIFICANT CHANGE UP (ref 150–400)
POTASSIUM SERPL-MCNC: 4.3 MMOL/L — SIGNIFICANT CHANGE UP (ref 3.5–5.3)
POTASSIUM SERPL-SCNC: 4.3 MMOL/L — SIGNIFICANT CHANGE UP (ref 3.5–5.3)
PROT SERPL-MCNC: 7.7 G/DL — SIGNIFICANT CHANGE UP (ref 6–8.3)
RBC # BLD: 4.05 M/UL — SIGNIFICANT CHANGE UP (ref 3.8–5.2)
RBC # FLD: 12.7 % — SIGNIFICANT CHANGE UP (ref 10.3–14.5)
SODIUM SERPL-SCNC: 136 MMOL/L — SIGNIFICANT CHANGE UP (ref 135–145)
WBC # BLD: 11.27 K/UL — HIGH (ref 3.8–10.5)
WBC # FLD AUTO: 11.27 K/UL — HIGH (ref 3.8–10.5)

## 2019-09-13 PROCEDURE — 74177 CT ABD & PELVIS W/CONTRAST: CPT | Mod: 26

## 2019-09-13 PROCEDURE — 80053 COMPREHEN METABOLIC PANEL: CPT

## 2019-09-13 PROCEDURE — 99285 EMERGENCY DEPT VISIT HI MDM: CPT

## 2019-09-13 PROCEDURE — 85027 COMPLETE CBC AUTOMATED: CPT

## 2019-09-13 PROCEDURE — 74177 CT ABD & PELVIS W/CONTRAST: CPT

## 2019-09-13 PROCEDURE — 99284 EMERGENCY DEPT VISIT MOD MDM: CPT | Mod: 25

## 2019-09-13 PROCEDURE — 96374 THER/PROPH/DIAG INJ IV PUSH: CPT | Mod: XU

## 2019-09-13 PROCEDURE — 36415 COLL VENOUS BLD VENIPUNCTURE: CPT

## 2019-09-13 RX ORDER — METRONIDAZOLE 500 MG
1 TABLET ORAL
Qty: 21 | Refills: 0
Start: 2019-09-13 | End: 2019-09-19

## 2019-09-13 RX ORDER — ACETAMINOPHEN 500 MG
2 TABLET ORAL
Qty: 30 | Refills: 0
Start: 2019-09-13

## 2019-09-13 RX ORDER — MOXIFLOXACIN HYDROCHLORIDE TABLETS, 400 MG 400 MG/1
1 TABLET, FILM COATED ORAL
Qty: 14 | Refills: 0
Start: 2019-09-13 | End: 2019-09-19

## 2019-09-13 RX ORDER — IOHEXOL 300 MG/ML
30 INJECTION, SOLUTION INTRAVENOUS ONCE
Refills: 0 | Status: COMPLETED | OUTPATIENT
Start: 2019-09-13 | End: 2019-09-13

## 2019-09-13 RX ORDER — ACETAMINOPHEN 500 MG
1000 TABLET ORAL ONCE
Refills: 0 | Status: COMPLETED | OUTPATIENT
Start: 2019-09-13 | End: 2019-09-13

## 2019-09-13 RX ORDER — SODIUM CHLORIDE 9 MG/ML
500 INJECTION INTRAMUSCULAR; INTRAVENOUS; SUBCUTANEOUS ONCE
Refills: 0 | Status: COMPLETED | OUTPATIENT
Start: 2019-09-13 | End: 2019-09-13

## 2019-09-13 RX ADMIN — SODIUM CHLORIDE 500 MILLILITER(S): 9 INJECTION INTRAMUSCULAR; INTRAVENOUS; SUBCUTANEOUS at 12:57

## 2019-09-13 RX ADMIN — IOHEXOL 30 MILLILITER(S): 300 INJECTION, SOLUTION INTRAVENOUS at 12:57

## 2019-09-13 RX ADMIN — Medication 400 MILLIGRAM(S): at 12:57

## 2019-09-13 NOTE — ED PROVIDER NOTE - OBJECTIVE STATEMENT
86 y/o F with a PMHx of Arthritis, HTN and no PSHx presents to ED c/o constipation x 10 days. Pt endorses being able to pass gas. Last BM was several days ago with some rectal pain. Denies bleeding, N/V, fever, abd pain or any other complaint. NKDA.

## 2019-09-13 NOTE — ED PROVIDER NOTE - PROGRESS NOTE DETAILS
patient ct show concern for steatocolitis and constipation. given abx and enema. patient had multiple bm in ed after oral contrast. abd soft, nontender. renal mass endorses to family and patient who states she knew about a kidney mass, instructed to f.u pmd. return precaution provided. vital stable. return home with family.

## 2019-09-13 NOTE — ED PROVIDER NOTE - PATIENT PORTAL LINK FT
You can access the FollowMyHealth Patient Portal offered by [facility] by registering at the following website: [email or web address]. By joining [facility]’s FollowMyHealth portal, you will also be able to view your health information using other applications (apps) compatible with our system.

## 2019-09-13 NOTE — ED PROVIDER NOTE - CLINICAL SUMMARY MEDICAL DECISION MAKING FREE TEXT BOX
Pt presenting with constipation and rectal pain without lesions to rectum. Rectal tone intact. Order Ct-scan, rule out obstruction. Control pain and reassess.

## 2019-09-17 ENCOUNTER — APPOINTMENT (OUTPATIENT)
Dept: WOUND CARE | Facility: CLINIC | Age: 84
End: 2019-09-17

## 2019-09-17 ENCOUNTER — OUTPATIENT (OUTPATIENT)
Dept: OUTPATIENT SERVICES | Facility: HOSPITAL | Age: 84
LOS: 1 days | End: 2019-09-17
Payer: MEDICARE

## 2019-09-17 VITALS
TEMPERATURE: 98.5 F | DIASTOLIC BLOOD PRESSURE: 79 MMHG | SYSTOLIC BLOOD PRESSURE: 146 MMHG | HEART RATE: 67 BPM | OXYGEN SATURATION: 99 % | RESPIRATION RATE: 18 BRPM | BODY MASS INDEX: 41.95 KG/M2 | WEIGHT: 293 LBS | HEIGHT: 70 IN

## 2019-09-17 DIAGNOSIS — Z00.00 ENCOUNTER FOR GENERAL ADULT MEDICAL EXAMINATION WITHOUT ABNORMAL FINDINGS: ICD-10-CM

## 2019-09-17 PROCEDURE — G0463: CPT

## 2019-09-17 PROCEDURE — 11042 DBRDMT SUBQ TIS 1ST 20SQCM/<: CPT

## 2019-09-17 PROCEDURE — 29580 STRAPPING UNNA BOOT: CPT

## 2019-09-17 NOTE — PLAN
[FreeTextEntry1] : O: \par Vasc: DP 1/4 b/l PT non-palpable b/l, CFT<3x10, TG warm to warm\par Derm: wound as described above -malodor -probe to bone, moderate drainage, -macerated borders on right medial malleolus, mild serous drainage. fibrogranular wound base\par Neuro: protective sensation intact to the level of the digits \par \par \par A: \par Ulcer medial malleolus right ankle \par \par P:\par Pt evaluated and chart created \par The patient was prepped in the usual aseptic manner and the foot was scrubbed with chlorhexadine\par Verbal consent was given for debridement\par Excional Debridement was performed with sterile currette to the level of subcutaneous tissue. All necrotic and devitalized tissue was removed and undermining and any tunneling was excised.\par Biopsy was taken from the wound previously- fibropurulent exudate\par santyl applied to the wound with adaptic\par Applied unna boot\par Dressing to be C/D/I for 1 week \par Pt and daughter gave verbal understanding \par Vascular follow up appointment in 1 week for venous duplex\par EO2 was offered to the patient.\par RTC 1 week for re-evaluation

## 2019-09-17 NOTE — PHYSICAL EXAM
[4 x 4] : 4 x 4  [FreeTextEntry1] : medial malleolus [FreeTextEntry2] : 3.2cm [de-identified] : victor manuel [FreeTextEntry3] : 1.8cm [FreeTextEntry4] : 0.cm [de-identified] : santyl [de-identified] : JENNA [TWNoteComboBox1] : Right [TWNoteComboBox4] : Small [TWNoteComboBox2] : 2 [de-identified] : None [TWNoteComboBox6] : Venous [TWNoteComboBox5] : Yes [de-identified] : <20% [de-identified] : 100% [de-identified] : Yes [TWNoteComboBox7] : Melissa [de-identified] : Debridement performed of all devitalized tissue to bleeding viable tissue

## 2019-09-17 NOTE — HISTORY OF PRESENT ILLNESS
[FreeTextEntry1] : 87 yr old female pt presents to the clinic complaining of painful medial malleolus ulcer on the right ankle. Pt presents with her daughters in a wheel chair. Pt states shes had the ulcer for months. Seen by vascular doctor, no intervention. Pt denies F/V/C/N/SOB. pt states an improvement in pain.

## 2019-09-18 DIAGNOSIS — I87.2 VENOUS INSUFFICIENCY (CHRONIC) (PERIPHERAL): ICD-10-CM

## 2019-09-18 DIAGNOSIS — L97.412 NON-PRESSURE CHRONIC ULCER OF RIGHT HEEL AND MIDFOOT WITH FAT LAYER EXPOSED: ICD-10-CM

## 2019-09-20 ENCOUNTER — APPOINTMENT (OUTPATIENT)
Dept: VASCULAR SURGERY | Facility: CLINIC | Age: 84
End: 2019-09-20
Payer: MEDICARE

## 2019-09-20 PROCEDURE — 93970 EXTREMITY STUDY: CPT

## 2019-09-20 PROCEDURE — 99213 OFFICE O/P EST LOW 20 MIN: CPT

## 2019-09-20 NOTE — PHYSICAL EXAM
[JVD] : no jugular venous distention  [Normal Breath Sounds] : Normal breath sounds [Normal Heart Sounds] : normal heart sounds [0] : right 0 [2+] : left 2+ [Ankle Swelling (On Exam)] : not present [Varicose Veins Of Lower Extremities] : bilaterally [] : present [Ankle Swelling On The Right] : mild [Ankle Swelling On The Left] : moderate [Abdomen Masses] : No abdominal masses [Skin Ulcer] : ulcer [Oriented to Place] : oriented to place

## 2019-09-20 NOTE — ASSESSMENT
[FreeTextEntry1] : Patient with right medial ankle ulceration. No evidence of significant arterial insufficiency. Continue local wound care. no dvt

## 2019-09-24 ENCOUNTER — OUTPATIENT (OUTPATIENT)
Dept: OUTPATIENT SERVICES | Facility: HOSPITAL | Age: 84
LOS: 1 days | End: 2019-09-24
Payer: MEDICARE

## 2019-09-24 ENCOUNTER — APPOINTMENT (OUTPATIENT)
Dept: WOUND CARE | Facility: CLINIC | Age: 84
End: 2019-09-24

## 2019-09-24 VITALS
TEMPERATURE: 98.2 F | SYSTOLIC BLOOD PRESSURE: 159 MMHG | OXYGEN SATURATION: 99 % | WEIGHT: 293 LBS | DIASTOLIC BLOOD PRESSURE: 82 MMHG | RESPIRATION RATE: 18 BRPM | HEIGHT: 70 IN | HEART RATE: 56 BPM | BODY MASS INDEX: 41.95 KG/M2

## 2019-09-24 DIAGNOSIS — Z00.00 ENCOUNTER FOR GENERAL ADULT MEDICAL EXAMINATION WITHOUT ABNORMAL FINDINGS: ICD-10-CM

## 2019-09-24 PROCEDURE — 11042 DBRDMT SUBQ TIS 1ST 20SQCM/<: CPT

## 2019-09-24 PROCEDURE — G0463: CPT

## 2019-09-24 NOTE — PLAN
[FreeTextEntry1] : O: \par Vasc: DP 1/4 b/l PT non-palpable b/l, CFT<3x10, TG warm to warm\par Derm: wound as described above -malodor -probe to bone, moderate drainage, -macerated borders on right medial malleolus, mild serous drainage. fibrogranular wound base\par Neuro: protective sensation intact to the level of the digits \par \par \par A: \par Ulcer medial malleolus right ankle \par \par P:\par Pt evaluated and chart created \par The patient was prepped in the usual aseptic manner and the foot was scrubbed with chlorhexadine\par Verbal consent was given for debridement\par Excional Debridement was performed with sterile currette to the level of subcutaneous tissue. All necrotic and devitalized tissue was removed and undermining and any tunneling was excised.\par Biopsy was taken from the wound previously- fibropurulent exudate\par santyl applied to the wound with adaptic\par Applied unna boot\par Dressing to be C/D/I for 1 week \par Pt and daughter gave verbal understanding \par Will start approval process for apligraft as wound has not changed much in size since the last two visits. \par RTC 1 week for re-evaluation

## 2019-09-24 NOTE — HISTORY OF PRESENT ILLNESS
[FreeTextEntry1] : 87 yr old female pt presents to the clinic complaining of painful medial malleolus ulcer on the right ankle. Pt presents with her daughters in a wheel chair. Pt states shes had the ulcer for months. Seen by vascular doctor, no intervention. Pt denies F/V/C/N/SOB. pt states an improvement in pain and ulcer size

## 2019-09-24 NOTE — PHYSICAL EXAM
[4 x 4] : 4 x 4  [FreeTextEntry1] : medial malleolus [de-identified] : victor manuel [FreeTextEntry2] : 3.2cm [FreeTextEntry3] : 1.8cm [FreeTextEntry4] : 0.2cm [de-identified] : santyl [de-identified] : JENNA [TWNoteComboBox2] : 2 [TWNoteComboBox1] : Right [TWNoteComboBox4] : Small [TWNoteComboBox5] : Yes [TWNoteComboBox6] : Venous [de-identified] : None [de-identified] : 100% [de-identified] : <20% [TWNoteComboBox7] : Melissa [de-identified] : Yes [de-identified] : Debridement performed of all devitalized tissue to bleeding viable tissue

## 2019-10-01 ENCOUNTER — APPOINTMENT (OUTPATIENT)
Dept: WOUND CARE | Facility: CLINIC | Age: 84
End: 2019-10-01

## 2019-10-01 ENCOUNTER — OUTPATIENT (OUTPATIENT)
Dept: OUTPATIENT SERVICES | Facility: HOSPITAL | Age: 84
LOS: 1 days | End: 2019-10-01
Payer: MEDICARE

## 2019-10-01 DIAGNOSIS — L97.412 NON-PRESSURE CHRONIC ULCER OF RIGHT HEEL AND MIDFOOT WITH FAT LAYER EXPOSED: ICD-10-CM

## 2019-10-01 DIAGNOSIS — Z00.00 ENCOUNTER FOR GENERAL ADULT MEDICAL EXAMINATION WITHOUT ABNORMAL FINDINGS: ICD-10-CM

## 2019-10-01 PROCEDURE — 97597 DBRDMT OPN WND 1ST 20 CM/<: CPT

## 2019-10-01 PROCEDURE — 15275 SKIN SUB GRAFT FACE/NK/HF/G: CPT

## 2019-10-01 NOTE — PLAN
[FreeTextEntry1] : O: \par Vasc: DP 1/4 b/l PT non-palpable b/l, CFT<3x10, TG warm to warm\par Derm: wound as described above -malodor -probe to bone, moderate drainage, -macerated borders on right medial malleolus, mild serous drainage. fibrogranular wound base\par Neuro: protective sensation intact to the level of the digits \par \par \par A: \par Ulcer medial malleolus right ankle \par \par P:\par Pt evaluated and chart created \par The patient was prepped in the usual aseptic manner and the foot was scrubbed with chlorhexadine\par Verbal consent was given for debridement\par Excisional debridement was performed with sterile #10to the level of subcutaneous tissue. All necrotic and devitalized tissue was removed and undermining and any tunneling was excised.\par Biopsy was taken from the wound previously- fibropurulent exudate\par Apligraft #1 applied to wound and secured with Adaptic, Silvercel and steri strips\par Applied unna boot\par Dressing to be C/D/I for 1 week \par Pt and daughter gave verbal understanding \par Will start approval process for apligraft as wound has not changed much in size since the last two visits. \par RTC 1 week for re-evaluation

## 2019-10-01 NOTE — PHYSICAL EXAM
[4 x 4] : 4 x 4  [FreeTextEntry1] : medial malleolus [FreeTextEntry2] : 4.2cm [de-identified] : victor manuel [FreeTextEntry3] : 3cm [FreeTextEntry4] : 0.3cm [de-identified] : apligraft [de-identified] : Apligraft #1 [TWNoteComboBox1] : Right [TWNoteComboBox2] : 2 [TWNoteComboBox4] : Small [TWNoteComboBox5] : Yes [de-identified] : None [TWNoteComboBox6] : Venous [de-identified] : 100% [de-identified] : <20% [TWNoteComboBox7] : Melissa [de-identified] : Yes [de-identified] : Debridement performed of all devitalized tissue to bleeding viable tissue

## 2019-10-03 DIAGNOSIS — L97.312 NON-PRESSURE CHRONIC ULCER OF RIGHT ANKLE WITH FAT LAYER EXPOSED: ICD-10-CM

## 2019-10-03 DIAGNOSIS — E11.621 TYPE 2 DIABETES MELLITUS WITH FOOT ULCER: ICD-10-CM

## 2019-10-10 ENCOUNTER — APPOINTMENT (OUTPATIENT)
Dept: WOUND CARE | Facility: CLINIC | Age: 84
End: 2019-10-10

## 2019-10-10 ENCOUNTER — OUTPATIENT (OUTPATIENT)
Dept: OUTPATIENT SERVICES | Facility: HOSPITAL | Age: 84
LOS: 1 days | End: 2019-10-10
Payer: MEDICARE

## 2019-10-10 VITALS
OXYGEN SATURATION: 100 % | RESPIRATION RATE: 18 BRPM | SYSTOLIC BLOOD PRESSURE: 146 MMHG | HEART RATE: 6 BPM | DIASTOLIC BLOOD PRESSURE: 75 MMHG | TEMPERATURE: 98.5 F

## 2019-10-10 DIAGNOSIS — Z00.00 ENCOUNTER FOR GENERAL ADULT MEDICAL EXAMINATION WITHOUT ABNORMAL FINDINGS: ICD-10-CM

## 2019-10-10 PROCEDURE — 11042 DBRDMT SUBQ TIS 1ST 20SQCM/<: CPT | Mod: RT

## 2019-10-11 DIAGNOSIS — L97.312 NON-PRESSURE CHRONIC ULCER OF RIGHT ANKLE WITH FAT LAYER EXPOSED: ICD-10-CM

## 2019-10-15 NOTE — PLAN
[FreeTextEntry1] : O: \par Vasc: DP 1/4 b/l PT non-palpable b/l, CFT<3x10, TG warm to warm\par Derm: wound as described above -malodor -probe to bone, moderate drainage, -macerated borders on right medial malleolus, mild serous drainage. fibrogranular wound base\par Neuro: protective sensation intact to the level of the digits \par \par \par A: \par Ulcer medial malleolus right ankle \par \par P:\par Pt evaluated and chart created \par The patient was prepped in the usual aseptic manner and the foot was scrubbed with chlorhexadine\par Verbal consent was given for debridement\par Excisional debridement was performed with sterile #10 to the level of subcutaneous tissue. All necrotic and devitalized tissue was removed and undermining and any tunneling was excised.\par Biopsy was taken from the wound previously- fibropurulent exudate\par Apligraft #2 applied to wound and secured with Adaptic, Silvercel and steri strips\par Applied unna boot\par Dressing to be C/D/I for 1 week \par Pt and daughter gave verbal understanding \par RTC 1 week for re-evaluation

## 2019-10-15 NOTE — PHYSICAL EXAM
[4 x 4] : 4 x 4  [FreeTextEntry1] : medial malleolus [de-identified] : victor manuel [FreeTextEntry2] : 3.5cm [FreeTextEntry3] : 2.2cm [FreeTextEntry4] : 0.4cm [de-identified] : apligraft [de-identified] : Apligraft #2 [TWNoteComboBox1] : Right [TWNoteComboBox2] : 2 [TWNoteComboBox4] : Small [TWNoteComboBox5] : Yes [TWNoteComboBox6] : Venous [de-identified] : None [de-identified] : 100% [de-identified] : <20% [TWNoteComboBox7] : Melissa [de-identified] : Yes [de-identified] : Debridement performed of all devitalized tissue to bleeding viable tissue

## 2019-10-17 ENCOUNTER — APPOINTMENT (OUTPATIENT)
Dept: WOUND CARE | Facility: CLINIC | Age: 84
End: 2019-10-17

## 2019-10-17 ENCOUNTER — OUTPATIENT (OUTPATIENT)
Dept: OUTPATIENT SERVICES | Facility: HOSPITAL | Age: 84
LOS: 1 days | End: 2019-10-17
Payer: MEDICARE

## 2019-10-17 VITALS — OXYGEN SATURATION: 98 % | HEART RATE: 96 BPM | RESPIRATION RATE: 18 BRPM | HEIGHT: 70 IN

## 2019-10-17 DIAGNOSIS — Z00.00 ENCOUNTER FOR GENERAL ADULT MEDICAL EXAMINATION WITHOUT ABNORMAL FINDINGS: ICD-10-CM

## 2019-10-17 PROCEDURE — G0463: CPT

## 2019-10-17 NOTE — PHYSICAL EXAM
[4 x 4] : 4 x 4  [FreeTextEntry1] : medial malleolus [de-identified] : victor manuel [FreeTextEntry2] : 3.5cm [FreeTextEntry3] : 2.2cm [de-identified] : apligraft [de-identified] : Apligraft #2\par \par adaptec, silvercell, unna boot, coban [FreeTextEntry4] : 0.4cm [TWNoteComboBox2] : 2 [TWNoteComboBox4] : Small [TWNoteComboBox1] : Right [TWNoteComboBox5] : Yes [TWNoteComboBox6] : Venous [de-identified] : None [de-identified] : 100% [de-identified] : <20% [TWNoteComboBox7] : Melissa [de-identified] : Yes [de-identified] : Debridement performed of all devitalized tissue to bleeding viable tissue

## 2019-10-17 NOTE — PLAN
[FreeTextEntry1] : O: \par Vasc: DP 1/4 b/l PT non-palpable b/l, CFT<3x10, TG warm to warm\par Derm: wound as described above -malodor -probe to bone, moderate drainage, -macerated borders on right medial malleolus, mild serous drainage. fibrogranular wound base\par Neuro: protective sensation intact to the level of the digits \par \par \par A: \par Ulcer medial malleolus right ankle \par \par P:\par Pt evaluated and chart created \par The patient was prepped in the usual aseptic manner and the foot was scrubbed with chlorhexadine\par Verbal consent was given for debridement\par Excisional debridement was performed with sterile #10 to the level of subcutaneous tissue. All necrotic and devitalized tissue was removed and undermining and any tunneling was excised.\par Biopsy was taken from the wound previously- fibropurulent exudate\par Apligraft #2 left intact on the wound and secured with Adaptic, Silvercel and steri strips\par Applied unna boot\par Dressing to be C/D/I for 1 week \par Pt and daughter gave verbal understanding \par RTC 1 week for re-evaluation

## 2019-10-18 DIAGNOSIS — L97.312 NON-PRESSURE CHRONIC ULCER OF RIGHT ANKLE WITH FAT LAYER EXPOSED: ICD-10-CM

## 2019-10-18 DIAGNOSIS — E11.621 TYPE 2 DIABETES MELLITUS WITH FOOT ULCER: ICD-10-CM

## 2019-10-24 ENCOUNTER — APPOINTMENT (OUTPATIENT)
Dept: WOUND CARE | Facility: CLINIC | Age: 84
End: 2019-10-24

## 2019-10-24 ENCOUNTER — OUTPATIENT (OUTPATIENT)
Dept: OUTPATIENT SERVICES | Facility: HOSPITAL | Age: 84
LOS: 1 days | End: 2019-10-24
Payer: MEDICARE

## 2019-10-24 VITALS
DIASTOLIC BLOOD PRESSURE: 80 MMHG | RESPIRATION RATE: 18 BRPM | HEART RATE: 98 BPM | OXYGEN SATURATION: 98 % | HEIGHT: 70 IN | TEMPERATURE: 98 F | SYSTOLIC BLOOD PRESSURE: 137 MMHG

## 2019-10-24 DIAGNOSIS — Z00.00 ENCOUNTER FOR GENERAL ADULT MEDICAL EXAMINATION WITHOUT ABNORMAL FINDINGS: ICD-10-CM

## 2019-10-24 PROCEDURE — 11042 DBRDMT SUBQ TIS 1ST 20SQCM/<: CPT

## 2019-10-24 PROCEDURE — 15275 SKIN SUB GRAFT FACE/NK/HF/G: CPT | Mod: RT

## 2019-10-24 NOTE — PLAN
[FreeTextEntry1] : O: \par Vasc: DP 1/4 b/l PT non-palpable b/l, CFT<3x10, TG warm to warm\par Derm: wound as described above -malodor -probe to bone, moderate drainage, -macerated borders on right medial malleolus, mild serous drainage. fibrogranular wound base\par Neuro: protective sensation intact to the level of the digits \par \par \par A: \par Ulcer medial malleolus right ankle \par \par P:\par Pt evaluated and chart created \par The patient was prepped in the usual aseptic manner and the foot was scrubbed with chlorhexadine\par Verbal consent was given for debridement\par Excisional debridement was performed with sterile #10 to the level of subcutaneous tissue. All necrotic and devitalized tissue was removed and undermining and any tunneling was excised.\par Biopsy was taken from the wound previously- fibropurulent exudate\par Apligraft #3 applied to the the wound and secured with Adaptic, Silvercel and steri strips\par Applied dynaflex \par Dressing to be C/D/I for 1 week \par Pt and daughter gave verbal understanding \par RTC 1 week for re-evaluation

## 2019-10-24 NOTE — PHYSICAL EXAM
[4 x 4] : 4 x 4  [FreeTextEntry1] : medial malleolus [FreeTextEntry2] : 3.0cm [de-identified] : victor manuel [FreeTextEntry4] : 0.4cm [FreeTextEntry3] : 2.0 cm [de-identified] : Apligraft #3\par \par adaptec, silvercell, unna boot, coban [de-identified] : apligraft [TWNoteComboBox4] : Small [TWNoteComboBox2] : 2 [TWNoteComboBox1] : Right [TWNoteComboBox5] : Yes [TWNoteComboBox6] : Venous [de-identified] : None [de-identified] : 100% [de-identified] : <20% [TWNoteComboBox7] : Melissa [de-identified] : Yes [de-identified] : Debridement performed of all devitalized tissue to bleeding viable tissue

## 2019-10-30 DIAGNOSIS — L97.312 NON-PRESSURE CHRONIC ULCER OF RIGHT ANKLE WITH FAT LAYER EXPOSED: ICD-10-CM

## 2019-10-31 ENCOUNTER — OUTPATIENT (OUTPATIENT)
Dept: OUTPATIENT SERVICES | Facility: HOSPITAL | Age: 84
LOS: 1 days | End: 2019-10-31
Payer: MEDICARE

## 2019-10-31 ENCOUNTER — APPOINTMENT (OUTPATIENT)
Dept: WOUND CARE | Facility: CLINIC | Age: 84
End: 2019-10-31

## 2019-10-31 VITALS
SYSTOLIC BLOOD PRESSURE: 138 MMHG | TEMPERATURE: 97.9 F | OXYGEN SATURATION: 97 % | DIASTOLIC BLOOD PRESSURE: 81 MMHG | HEART RATE: 86 BPM | RESPIRATION RATE: 18 BRPM | HEIGHT: 70 IN

## 2019-10-31 DIAGNOSIS — Z00.00 ENCOUNTER FOR GENERAL ADULT MEDICAL EXAMINATION WITHOUT ABNORMAL FINDINGS: ICD-10-CM

## 2019-10-31 PROCEDURE — 11042 DBRDMT SUBQ TIS 1ST 20SQCM/<: CPT

## 2019-10-31 NOTE — PHYSICAL EXAM
[4 x 4] : 4 x 4  [FreeTextEntry1] : medial malleolus [de-identified] : victor manuel [FreeTextEntry2] : 3.0cm [FreeTextEntry3] : 2.0 cm [FreeTextEntry4] : 0.4cm [de-identified] : apligraft [de-identified] : Apligraft #3 left intact\par \par brian bran webril. ace, coban [TWNoteComboBox1] : Right [TWNoteComboBox2] : 2 [TWNoteComboBox4] : Small [TWNoteComboBox5] : Yes [TWNoteComboBox6] : Venous [de-identified] : None [de-identified] : 100% [de-identified] : <20% [de-identified] : Yes [TWNoteComboBox7] : Melissa [de-identified] : Debridement performed of all devitalized tissue to bleeding viable tissue

## 2019-10-31 NOTE — PLAN
[FreeTextEntry1] : O: \par Vasc: DP 1/4 b/l PT non-palpable b/l, CFT<3x10, TG warm to warm\par Derm: wound as described above -malodor -probe to bone, moderate drainage, -macerated borders on right medial malleolus, mild serous drainage. fibrogranular wound base\par Neuro: protective sensation intact to the level of the digits \par \par \par A: \par Ulcer medial malleolus right ankle \par \par P:\par Pt evaluated and chart created \par Verbal consent was given and aplifgraft was fenestrated to a healthy bleeding\par Apligraft #3 was left intact with Adaptic, Silvercel,\par Applied multilayer compressive dressing\par Dressing to be C/D/I for 1 week \par Pt and daughter gave verbal understanding \par RTC 1 week for re-evaluation

## 2019-11-01 DIAGNOSIS — L97.312 NON-PRESSURE CHRONIC ULCER OF RIGHT ANKLE WITH FAT LAYER EXPOSED: ICD-10-CM

## 2019-11-01 DIAGNOSIS — E11.621 TYPE 2 DIABETES MELLITUS WITH FOOT ULCER: ICD-10-CM

## 2019-11-07 ENCOUNTER — APPOINTMENT (OUTPATIENT)
Dept: WOUND CARE | Facility: CLINIC | Age: 84
End: 2019-11-07

## 2019-11-07 ENCOUNTER — OUTPATIENT (OUTPATIENT)
Dept: OUTPATIENT SERVICES | Facility: HOSPITAL | Age: 84
LOS: 1 days | End: 2019-11-07
Payer: MEDICARE

## 2019-11-07 VITALS
HEART RATE: 60 BPM | OXYGEN SATURATION: 97 % | TEMPERATURE: 98.1 F | DIASTOLIC BLOOD PRESSURE: 71 MMHG | HEIGHT: 70 IN | RESPIRATION RATE: 18 BRPM | SYSTOLIC BLOOD PRESSURE: 138 MMHG

## 2019-11-07 DIAGNOSIS — Z00.00 ENCOUNTER FOR GENERAL ADULT MEDICAL EXAMINATION WITHOUT ABNORMAL FINDINGS: ICD-10-CM

## 2019-11-07 PROCEDURE — 97597 DBRDMT OPN WND 1ST 20 CM/<: CPT

## 2019-11-07 PROCEDURE — 15275 SKIN SUB GRAFT FACE/NK/HF/G: CPT

## 2019-11-07 NOTE — PHYSICAL EXAM
[4 x 4] : 4 x 4  [FreeTextEntry1] : medial malleolus [de-identified] : victor manuel [FreeTextEntry2] : 3.0cm [FreeTextEntry3] : 2.0 cm [FreeTextEntry4] : 0.4cm [de-identified] : Apligraft #4 left intact\par \par brian bran webril. ace, coban [de-identified] : apligraft [TWNoteComboBox1] : Right [TWNoteComboBox2] : 2 [TWNoteComboBox5] : Yes [TWNoteComboBox4] : Small [de-identified] : None [TWNoteComboBox6] : Venous [de-identified] : 100% [de-identified] : <20% [de-identified] : Yes [TWNoteComboBox7] : Melissa [de-identified] : Debridement performed of all devitalized tissue to bleeding viable tissue

## 2019-11-07 NOTE — PHYSICAL EXAM
[4 x 4] : 4 x 4  [FreeTextEntry1] : medial malleolus [de-identified] : victor manuel [FreeTextEntry2] : 3.0cm [FreeTextEntry4] : 0.4cm [FreeTextEntry3] : 2.0 cm [de-identified] : Apligraft #4 left intact\par \par brian bran webril. ace, coban [de-identified] : apligraft [TWNoteComboBox2] : 2 [TWNoteComboBox1] : Right [TWNoteComboBox4] : Small [TWNoteComboBox5] : Yes [de-identified] : None [TWNoteComboBox6] : Venous [de-identified] : <20% [de-identified] : 100% [de-identified] : Yes [TWNoteComboBox7] : Melissa [de-identified] : Debridement performed of all devitalized tissue to bleeding viable tissue

## 2019-11-07 NOTE — PLAN
[FreeTextEntry1] : O: \par Vasc: DP 1/4 b/l PT non-palpable b/l, CFT<3x10, TG warm to warm\par Derm: wound as described above -malodor -probe to bone, moderate drainage, -macerated borders on right medial malleolus, mild serous drainage. fibrogranular wound base\par Neuro: protective sensation intact to the level of the digits \par \par \par A: \par Ulcer medial malleolus right ankle \par \par P:\par Pt evaluated and chart created \par Verbal consent was given and aplifgraft was fenestrated to a healthy bleeding\par Apligraft #4 was left intact with Adaptic, Silvercel,\par Applied multilayer compressive dressing\par Dressing to be C/D/I for 1 week \par Pt and daughter gave verbal understanding \par RTC 1 week for re-evaluation

## 2019-11-12 DIAGNOSIS — L97.322 NON-PRESSURE CHRONIC ULCER OF LEFT ANKLE WITH FAT LAYER EXPOSED: ICD-10-CM

## 2019-11-14 ENCOUNTER — OUTPATIENT (OUTPATIENT)
Dept: OUTPATIENT SERVICES | Facility: HOSPITAL | Age: 84
LOS: 1 days | End: 2019-11-14
Payer: MEDICARE

## 2019-11-14 ENCOUNTER — APPOINTMENT (OUTPATIENT)
Dept: WOUND CARE | Facility: CLINIC | Age: 84
End: 2019-11-14

## 2019-11-14 VITALS
RESPIRATION RATE: 18 BRPM | DIASTOLIC BLOOD PRESSURE: 74 MMHG | OXYGEN SATURATION: 100 % | TEMPERATURE: 97.4 F | SYSTOLIC BLOOD PRESSURE: 122 MMHG | HEART RATE: 84 BPM | HEIGHT: 70 IN

## 2019-11-14 DIAGNOSIS — Z00.00 ENCOUNTER FOR GENERAL ADULT MEDICAL EXAMINATION WITHOUT ABNORMAL FINDINGS: ICD-10-CM

## 2019-11-14 PROCEDURE — 11042 DBRDMT SUBQ TIS 1ST 20SQCM/<: CPT

## 2019-11-15 DIAGNOSIS — E11.621 TYPE 2 DIABETES MELLITUS WITH FOOT ULCER: ICD-10-CM

## 2019-11-15 DIAGNOSIS — L97.322 NON-PRESSURE CHRONIC ULCER OF LEFT ANKLE WITH FAT LAYER EXPOSED: ICD-10-CM

## 2019-11-20 NOTE — PHYSICAL EXAM
[4 x 4] : 4 x 4  [FreeTextEntry1] : medial malleolus [de-identified] : victor manuel [FreeTextEntry2] : 3.0cm [FreeTextEntry3] : 1.5 cm [FreeTextEntry4] : 0.2cm [de-identified] : apligraft [de-identified] : Apligraft #4 left intact\par \par adaptec, webril. ace, coban [TWNoteComboBox1] : Right [TWNoteComboBox2] : 2 [TWNoteComboBox4] : Small [TWNoteComboBox5] : No [TWNoteComboBox6] : Venous [de-identified] : 100% [de-identified] : None [TWNoteComboBox7] : Melissa [de-identified] : Yes [de-identified] : <20% [de-identified] : Debridement performed of all devitalized tissue to bleeding viable tissue

## 2019-11-20 NOTE — PLAN
[FreeTextEntry1] : O: \par Vasc: DP 1/4 b/l PT non-palpable b/l, CFT<3x10, TG warm to warm\par Derm: wound as described above -malodor -probe to bone, moderate drainage, -macerated borders on right medial malleolus, mild serous drainage. fibrogranular wound base\par Neuro: protective sensation intact to the level of the digits \par \par \par A: \par Ulcer medial malleolus right ankle, improvement in size\par \par P:\par Pt evaluated and chart created \par Verbal consent was given and aplifgraft was fenestrated to a healthy bleeding\par Wound debrided down to including subcutaneous tissue with removal of fibrotic tissue with sterile #15 blade to reveal granular wound bed and healthy bleeding, \par Apligraft #4 was left intact with Adaptic, gauze\par Applied multilayer compressive dressing consisted of webril, ace and coban\par Pt and daughter gave verbal understanding \par RTC 1 week for re-evaluation

## 2019-11-20 NOTE — HISTORY OF PRESENT ILLNESS
[FreeTextEntry1] : 87 yr old DM female pt presents to the clinic complaining of painful medial malleolus ulcer on the right ankle. Pt presents with her daughters in a wheel chair. Pt states shes had the ulcer for months. Seen by vascular doctor, no intervention. Pt denies F/V/C/N/SOB. pt states an improvement in pain and ulcer size. Apligraf #4 was applied last week. FBS 140mg/dl this AM

## 2019-11-21 ENCOUNTER — APPOINTMENT (OUTPATIENT)
Dept: WOUND CARE | Facility: CLINIC | Age: 84
End: 2019-11-21

## 2019-11-21 ENCOUNTER — OUTPATIENT (OUTPATIENT)
Dept: OUTPATIENT SERVICES | Facility: HOSPITAL | Age: 84
LOS: 1 days | End: 2019-11-21
Payer: MEDICARE

## 2019-11-21 VITALS
SYSTOLIC BLOOD PRESSURE: 100 MMHG | HEIGHT: 70 IN | OXYGEN SATURATION: 99 % | RESPIRATION RATE: 18 BRPM | TEMPERATURE: 98.5 F | DIASTOLIC BLOOD PRESSURE: 60 MMHG | HEART RATE: 75 BPM | BODY MASS INDEX: 41.95 KG/M2 | WEIGHT: 293 LBS

## 2019-11-21 DIAGNOSIS — Z00.00 ENCOUNTER FOR GENERAL ADULT MEDICAL EXAMINATION WITHOUT ABNORMAL FINDINGS: ICD-10-CM

## 2019-11-21 PROCEDURE — 15275 SKIN SUB GRAFT FACE/NK/HF/G: CPT | Mod: RT

## 2019-11-21 PROCEDURE — 97597 DBRDMT OPN WND 1ST 20 CM/<: CPT | Mod: RT

## 2019-11-21 NOTE — HISTORY OF PRESENT ILLNESS
[FreeTextEntry1] : 88 yr old DM female pt presents to the clinic complaining of painful medial malleolus ulcer on the right ankle. Pt presents with her daughters in a wheel chair. Pt states shes had the ulcer for months. Seen by vascular doctor, no intervention. Pt denies F/V/C/N/SOB. pt states an improvement in pain and ulcer size. Apligraf #4 was applied  two weeks ago.  mg/dl this (11/21)

## 2019-11-21 NOTE — PHYSICAL EXAM
[4 x 4] : 4 x 4  [FreeTextEntry1] : medial malleolus [de-identified] : victor manuel [FreeTextEntry2] : 2.5cm [FreeTextEntry3] : 1.2  cm [FreeTextEntry4] : 0.2cm [de-identified] : apligraft [de-identified] : Apligraft  left intact\par \par adaptec, webril. ace, coban [TWNoteComboBox1] : Right [TWNoteComboBox2] : 2 [TWNoteComboBox4] : Small [TWNoteComboBox5] : No [TWNoteComboBox6] : Venous [de-identified] : None [de-identified] : 100% [de-identified] : <20% [de-identified] : Yes [TWNoteComboBox7] : Melissa [de-identified] : Debridement performed of all devitalized tissue to bleeding viable tissue

## 2019-11-21 NOTE — PLAN
[FreeTextEntry1] : O: \par Vasc: DP 1/4 b/l PT non-palpable b/l, CFT<3x10, TG warm to warm\par Derm: wound as described above -malodor -probe to bone, moderate drainage, -macerated borders on right medial malleolus, mild serous drainage. fibrogranular wound base\par Neuro: protective sensation intact to the level of the digits \par \par \par A: \par Ulcer medial malleolus right ankle, improvement in size\par \par P:\par Pt evaluated and chart created \par Verbal consent was given and aplifgraft was fenestrated to a healthy bleeding\par Wound debrided down to including subcutaneous tissue with removal of fibrotic tissue with sterile #15 blade to reveal granular wound bed and healthy bleeding, \par Apligraft #5 was applied with adaptic, silvercell 4x4 gauze and Kerlex - held intact with Steri-strips \par Applied multilayer compressive dressing consisted of webril, ace and coban\par Pt and daughter gave verbal understanding \par RTC 1 week for re-evaluation

## 2019-11-26 DIAGNOSIS — E11.621 TYPE 2 DIABETES MELLITUS WITH FOOT ULCER: ICD-10-CM

## 2019-11-26 DIAGNOSIS — L97.312 NON-PRESSURE CHRONIC ULCER OF RIGHT ANKLE WITH FAT LAYER EXPOSED: ICD-10-CM

## 2019-11-27 ENCOUNTER — OUTPATIENT (OUTPATIENT)
Dept: OUTPATIENT SERVICES | Facility: HOSPITAL | Age: 84
LOS: 1 days | End: 2019-11-27
Payer: MEDICARE

## 2019-11-27 ENCOUNTER — APPOINTMENT (OUTPATIENT)
Dept: WOUND CARE | Facility: CLINIC | Age: 84
End: 2019-11-27

## 2019-11-27 VITALS
OXYGEN SATURATION: 99 % | RESPIRATION RATE: 18 BRPM | DIASTOLIC BLOOD PRESSURE: 75 MMHG | HEIGHT: 70 IN | TEMPERATURE: 98.5 F | HEART RATE: 60 BPM | SYSTOLIC BLOOD PRESSURE: 144 MMHG

## 2019-11-27 DIAGNOSIS — Z00.00 ENCOUNTER FOR GENERAL ADULT MEDICAL EXAMINATION WITHOUT ABNORMAL FINDINGS: ICD-10-CM

## 2019-11-27 DIAGNOSIS — L97.312 NON-PRESSURE CHRONIC ULCER OF RIGHT ANKLE WITH FAT LAYER EXPOSED: ICD-10-CM

## 2019-11-27 PROCEDURE — 11042 DBRDMT SUBQ TIS 1ST 20SQCM/<: CPT

## 2019-11-27 NOTE — HISTORY OF PRESENT ILLNESS
[FreeTextEntry1] : 88 yr old DM female pt presents to the clinic complaining of painful medial malleolus ulcer on the right ankle. Pt presents with her daughters in a wheel chair. Pt states shes had the ulcer for months. Seen by vascular doctor, no intervention. Pt denies F/V/C/N/SOB. pt states an improvement in pain and ulcer size. Apligraf #5 was applied  one weeks ago.  mg/dl

## 2019-11-27 NOTE — PHYSICAL EXAM
[4 x 4] : 4 x 4  [FreeTextEntry1] : medial malleolus [de-identified] : victor manuel [FreeTextEntry2] : 2.5cm [FreeTextEntry3] : 1.2  cm [FreeTextEntry4] : 0.2cm [de-identified] : apligraft [de-identified] : \par judie bran. ace, coban [TWNoteComboBox1] : Right [TWNoteComboBox2] : 2 [TWNoteComboBox4] : Small [TWNoteComboBox5] : No [TWNoteComboBox6] : Venous [de-identified] : None [de-identified] : 100% [de-identified] : <20% [de-identified] : Yes [TWNoteComboBox7] : Melissa [de-identified] : Debridement performed of all devitalized tissue to bleeding viable tissue

## 2019-11-27 NOTE — PLAN
[FreeTextEntry1] : O: \par Vasc: DP 1/4 b/l PT non-palpable b/l, CFT<3x10, TG warm to warm\par Derm: wound as described above -malodor -probe to bone, moderate drainage, -macerated borders on right medial malleolus, mild serous drainage. fibrogranular wound base\par Neuro: protective sensation intact to the level of the digits \par \par \par A: \par Ulcer medial malleolus right ankle, improvement in size\par \par P:\par Pt evaluated and chart created \par Verbal consent was given and aplifgraft was fenestrated to a healthy bleeding\par Wound debrided down to including subcutaneous tissue with removal of fibrotic tissue with sterile #15 blade to reveal granular wound bed and healthy bleeding, \par Applied multilayer compressive dressing consisted of webril, ace and coban\par Pt and daughter gave verbal understanding \par RTC 1 week for re-evaluation

## 2019-12-05 ENCOUNTER — OUTPATIENT (OUTPATIENT)
Dept: OUTPATIENT SERVICES | Facility: HOSPITAL | Age: 84
LOS: 1 days | End: 2019-12-05
Payer: MEDICARE

## 2019-12-05 ENCOUNTER — APPOINTMENT (OUTPATIENT)
Dept: WOUND CARE | Facility: CLINIC | Age: 84
End: 2019-12-05

## 2019-12-05 VITALS
OXYGEN SATURATION: 100 % | SYSTOLIC BLOOD PRESSURE: 145 MMHG | TEMPERATURE: 98 F | DIASTOLIC BLOOD PRESSURE: 83 MMHG | RESPIRATION RATE: 19 BRPM | HEIGHT: 70 IN | HEART RATE: 74 BPM

## 2019-12-05 DIAGNOSIS — Z00.00 ENCOUNTER FOR GENERAL ADULT MEDICAL EXAMINATION WITHOUT ABNORMAL FINDINGS: ICD-10-CM

## 2019-12-05 PROCEDURE — 11042 DBRDMT SUBQ TIS 1ST 20SQCM/<: CPT

## 2019-12-05 NOTE — PLAN
[FreeTextEntry1] : O: \par Vasc: DP 1/4 b/l PT non-palpable b/l, CFT<3x10, TG warm to warm\par Derm: wound as described above -malodor -probe to bone, moderate drainage, -macerated borders on right medial malleolus, mild serous drainage. fibrogranular wound base\par Neuro: protective sensation intact to the level of the digits \par \par \par A: \par Ulcer medial malleolus right ankle, improvement in size\par \par P:\par Pt evaluated and chart created \par Verbal consent was given and aplifgraft was fenestrated to a healthy bleeding\par Wound debrided down to including subcutaneous tissue with removal of fibrotic tissue with sterile #15 blade to reveal granular wound bed and healthy bleeding, \par Applied santyl and adaptic \par Applied multilayer compressive dressing consisted of webril, ace and coban\par Pt and daughter gave verbal understanding \par RTC 1 week for re-evaluation

## 2019-12-05 NOTE — PHYSICAL EXAM
[4 x 4] : 4 x 4  [FreeTextEntry1] : medial malleolus [de-identified] : victor manuel [FreeTextEntry2] : 2.3cm [FreeTextEntry3] : 1.2  cm [FreeTextEntry4] : 0.2cm [de-identified] : santyl [de-identified] : \par judie bran. ace, coban [TWNoteComboBox1] : Right [TWNoteComboBox4] : Small [TWNoteComboBox2] : 2 [TWNoteComboBox5] : No [TWNoteComboBox6] : Venous [de-identified] : None [de-identified] : 100% [de-identified] : <20% [de-identified] : Yes [TWNoteComboBox7] : Melissa [de-identified] : Debridement performed of all devitalized tissue to bleeding viable tissue

## 2019-12-05 NOTE — HISTORY OF PRESENT ILLNESS
[FreeTextEntry1] : 88 yr old DM female pt presents to the clinic complaining of painful medial malleolus ulcer on the right ankle. Pt presents with her daughters in a wheel chair. Pt states shes had the ulcer for months. Seen by vascular doctor, no intervention. Pt denies F/V/C/N/SOB. pt states an improvement in pain and ulcer size. Apligraf #5 was applied two weeks ago. Pt does not check her blood sugar

## 2019-12-06 DIAGNOSIS — L97.312 NON-PRESSURE CHRONIC ULCER OF RIGHT ANKLE WITH FAT LAYER EXPOSED: ICD-10-CM

## 2019-12-12 ENCOUNTER — OUTPATIENT (OUTPATIENT)
Dept: OUTPATIENT SERVICES | Facility: HOSPITAL | Age: 84
LOS: 1 days | End: 2019-12-12
Payer: MEDICARE

## 2019-12-12 ENCOUNTER — APPOINTMENT (OUTPATIENT)
Dept: WOUND CARE | Facility: CLINIC | Age: 84
End: 2019-12-12

## 2019-12-12 VITALS — DIASTOLIC BLOOD PRESSURE: 78 MMHG | SYSTOLIC BLOOD PRESSURE: 149 MMHG

## 2019-12-12 VITALS
RESPIRATION RATE: 18 BRPM | HEIGHT: 70 IN | BODY MASS INDEX: 41.95 KG/M2 | SYSTOLIC BLOOD PRESSURE: 151 MMHG | TEMPERATURE: 98.8 F | WEIGHT: 293 LBS | DIASTOLIC BLOOD PRESSURE: 92 MMHG | HEART RATE: 60 BPM

## 2019-12-12 DIAGNOSIS — Z00.00 ENCOUNTER FOR GENERAL ADULT MEDICAL EXAMINATION WITHOUT ABNORMAL FINDINGS: ICD-10-CM

## 2019-12-12 PROCEDURE — 11042 DBRDMT SUBQ TIS 1ST 20SQCM/<: CPT

## 2019-12-12 NOTE — PHYSICAL EXAM
[4 x 4] : 4 x 4  [de-identified] : victor manuel [FreeTextEntry1] : medial malleolus [FreeTextEntry2] : 2.0cm [FreeTextEntry3] : 1.1  cm [FreeTextEntry4] : 0.2cm [TWNoteComboBox1] : Right [de-identified] : judie bran. ace, coban [de-identified] : santyl [TWNoteComboBox4] : Small [TWNoteComboBox2] : 2 [TWNoteComboBox5] : No [TWNoteComboBox6] : Venous [de-identified] : 100% [de-identified] : <20% [de-identified] : Yes [de-identified] : None [de-identified] : Debridement performed of all devitalized tissue to bleeding viable tissue [TWNoteComboBox7] : Melissa

## 2019-12-12 NOTE — PLAN
[FreeTextEntry1] : O: \par Vasc: DP 1/4 b/l PT non-palpable b/l, CFT<3x10, TG warm to warm\par Derm: wound as described above -malodor -probe to bone, moderate drainage, -macerated borders on right medial malleolus, mild serous drainage. fibrogranular wound base\par Neuro: protective sensation intact to the level of the digits \par \par A: Ulcer medial malleolus right ankle, improvement in size\par \par P:\par Pt evaluated and chart created \par Verbal consent was given for debridement\par Wound debrided down to including subcutaneous tissue with removal of fibrotic tissue with sterile #15 blade to reveal granular wound bed and healthy bleeding, \par Applied santyl and adaptic \par Applied multilayer compressive dressing consisted of webril, ace and coban\par Pt and daughter gave verbal understanding \par RTC 1 week for re-evaluation

## 2019-12-13 DIAGNOSIS — E11.621 TYPE 2 DIABETES MELLITUS WITH FOOT ULCER: ICD-10-CM

## 2019-12-13 DIAGNOSIS — L97.422 NON-PRESSURE CHRONIC ULCER OF LEFT HEEL AND MIDFOOT WITH FAT LAYER EXPOSED: ICD-10-CM

## 2019-12-19 ENCOUNTER — APPOINTMENT (OUTPATIENT)
Dept: WOUND CARE | Facility: CLINIC | Age: 84
End: 2019-12-19

## 2019-12-19 ENCOUNTER — OUTPATIENT (OUTPATIENT)
Dept: OUTPATIENT SERVICES | Facility: HOSPITAL | Age: 84
LOS: 1 days | End: 2019-12-19
Payer: MEDICARE

## 2019-12-19 VITALS
RESPIRATION RATE: 18 BRPM | TEMPERATURE: 98.2 F | HEIGHT: 70 IN | SYSTOLIC BLOOD PRESSURE: 154 MMHG | WEIGHT: 293 LBS | OXYGEN SATURATION: 99 % | DIASTOLIC BLOOD PRESSURE: 68 MMHG | BODY MASS INDEX: 41.95 KG/M2 | HEART RATE: 80 BPM

## 2019-12-19 DIAGNOSIS — Z00.00 ENCOUNTER FOR GENERAL ADULT MEDICAL EXAMINATION WITHOUT ABNORMAL FINDINGS: ICD-10-CM

## 2019-12-19 PROCEDURE — 11042 DBRDMT SUBQ TIS 1ST 20SQCM/<: CPT

## 2019-12-19 NOTE — PHYSICAL EXAM
[4 x 4] : 4 x 4  [de-identified] : victor manuel [FreeTextEntry1] : medial malleolus [FreeTextEntry2] : 2.8cm [FreeTextEntry3] : 1.2  cm [de-identified] : santyl [FreeTextEntry4] : 0.2cm [de-identified] : judie bran. ace, coban [TWNoteComboBox1] : Right [TWNoteComboBox2] : 2 [TWNoteComboBox4] : Small [TWNoteComboBox5] : No [TWNoteComboBox6] : Venous [de-identified] : 100% [de-identified] : None [de-identified] : Yes [TWNoteComboBox7] : Melissa [de-identified] : <20% [de-identified] : Debridement performed of all devitalized tissue to bleeding viable tissue

## 2019-12-19 NOTE — PLAN
[FreeTextEntry1] : O: \par Vasc: DP 1/4 b/l PT non-palpable b/l, CFT<3x10, TG warm to warm\par Derm: wound as described above -malodor -probe to bone, moderate drainage, -macerated borders on right medial malleolus, mild serous drainage. fibrogranular wound base\par Neuro: protective sensation intact to the level of the digits \par \par A: Ulcer medial malleolus right ankle, improvement in size\par \par P:\par Pt evaluated and chart created \par Verbal consent was given for debridement\par Wound debrided down to including subcutaneous tissue with removal of fibrotic tissue with sterile #15 blade to reveal granular wound bed and healthy bleeding, \par Applied santyl and adaptic \par Applied multilayer compressive dressing consisted of webril, ace and coban\par Recommended EO2\par Pt and daughter gave verbal understanding \par RTC 1 week for re-evaluation

## 2019-12-20 DIAGNOSIS — E11.621 TYPE 2 DIABETES MELLITUS WITH FOOT ULCER: ICD-10-CM

## 2019-12-20 DIAGNOSIS — L97.412 NON-PRESSURE CHRONIC ULCER OF RIGHT HEEL AND MIDFOOT WITH FAT LAYER EXPOSED: ICD-10-CM

## 2020-01-02 ENCOUNTER — OUTPATIENT (OUTPATIENT)
Dept: OUTPATIENT SERVICES | Facility: HOSPITAL | Age: 85
LOS: 1 days | End: 2020-01-02
Payer: MEDICARE

## 2020-01-02 ENCOUNTER — APPOINTMENT (OUTPATIENT)
Dept: WOUND CARE | Facility: CLINIC | Age: 85
End: 2020-01-02

## 2020-01-02 VITALS
HEART RATE: 63 BPM | DIASTOLIC BLOOD PRESSURE: 74 MMHG | SYSTOLIC BLOOD PRESSURE: 122 MMHG | OXYGEN SATURATION: 100 % | RESPIRATION RATE: 18 BRPM | HEIGHT: 70 IN | TEMPERATURE: 98.2 F

## 2020-01-02 DIAGNOSIS — Z00.00 ENCOUNTER FOR GENERAL ADULT MEDICAL EXAMINATION WITHOUT ABNORMAL FINDINGS: ICD-10-CM

## 2020-01-02 DIAGNOSIS — L97.312 NON-PRESSURE CHRONIC ULCER OF RIGHT ANKLE WITH FAT LAYER EXPOSED: ICD-10-CM

## 2020-01-02 PROCEDURE — 11042 DBRDMT SUBQ TIS 1ST 20SQCM/<: CPT

## 2020-01-02 NOTE — PHYSICAL EXAM
[4 x 4] : 4 x 4  [FreeTextEntry1] : medial malleolus [de-identified] : victor manuel [FreeTextEntry2] : 2.8cm [FreeTextEntry3] : 1.0  cm [FreeTextEntry4] : 0.2cm [de-identified] : santyl [de-identified] : judie bran. ace, coban [TWNoteComboBox1] : Right [TWNoteComboBox2] : 2 [TWNoteComboBox4] : Small [TWNoteComboBox5] : No [TWNoteComboBox6] : Venous [de-identified] : None [de-identified] : 100% [de-identified] : <20% [de-identified] : Yes [TWNoteComboBox7] : Melissa [de-identified] : Debridement performed of all devitalized tissue to bleeding viable tissue

## 2020-01-02 NOTE — HISTORY OF PRESENT ILLNESS
[FreeTextEntry1] : 88 yr old DM female pt presents to the clinic complaining of painful medial malleolus ulcer on the right ankle. Pt presents with her aid in a wheel chair. Pt states shes had the ulcer for about a year. Seen by vascular doctor, no intervention. Pt denies F/V/C/N/SOB. pt states an improvement in pain and ulcer size. Apligraf #5 was applied 3 weeks ago. Pt does not check her blood sugar

## 2020-01-02 NOTE — PLAN
[FreeTextEntry1] : O: \par Vasc: DP 1/4 b/l PT non-palpable b/l, CFT<3x10, TG warm to warm\par Derm: wound as described above -malodor -probe to bone, moderate drainage, -macerated borders on right medial malleolus, mild serous drainage. fibrogranular wound base\par Neuro: protective sensation intact to the level of the digits \par \par A: Ulcer medial malleolus right ankle, improvement in size\par \par P:\par Pt evaluated and chart created \par Verbal consent was given for debridement\par Wound debrided down to including subcutaneous tissue with removal of fibrotic tissue with sterile #15 blade to reveal granular wound bed and healthy bleeding, \par Applied santyl and adaptic \par Applied multilayer compressive dressing consisted of webril, ace and coban\par Recommended EO2\par Pt and aid gave verbal understanding \par RTC 1 week for re-evaluation

## 2020-01-09 ENCOUNTER — APPOINTMENT (OUTPATIENT)
Dept: WOUND CARE | Facility: CLINIC | Age: 85
End: 2020-01-09

## 2020-01-09 ENCOUNTER — OUTPATIENT (OUTPATIENT)
Dept: OUTPATIENT SERVICES | Facility: HOSPITAL | Age: 85
LOS: 1 days | End: 2020-01-09
Payer: MEDICARE

## 2020-01-09 VITALS
RESPIRATION RATE: 18 BRPM | DIASTOLIC BLOOD PRESSURE: 83 MMHG | HEART RATE: 64 BPM | OXYGEN SATURATION: 100 % | SYSTOLIC BLOOD PRESSURE: 153 MMHG | HEIGHT: 70 IN | TEMPERATURE: 97.9 F

## 2020-01-09 DIAGNOSIS — Z00.00 ENCOUNTER FOR GENERAL ADULT MEDICAL EXAMINATION WITHOUT ABNORMAL FINDINGS: ICD-10-CM

## 2020-01-09 PROCEDURE — 11042 DBRDMT SUBQ TIS 1ST 20SQCM/<: CPT

## 2020-01-09 NOTE — PHYSICAL EXAM
[4 x 4] : 4 x 4  [FreeTextEntry1] : medial malleolus [de-identified] : victor manuel [FreeTextEntry2] : 2.5cm [FreeTextEntry3] : 1.0  cm [de-identified] : santyl [FreeTextEntry4] : 0.2cm [de-identified] : judie bran. ace, coban [TWNoteComboBox1] : Right [TWNoteComboBox2] : 2 [TWNoteComboBox4] : Small [TWNoteComboBox6] : Venous [TWNoteComboBox5] : No [de-identified] : 100% [de-identified] : None [de-identified] : Yes [de-identified] : <20% [TWNoteComboBox7] : Melissa [de-identified] : Debridement performed of all devitalized tissue to bleeding viable tissue

## 2020-01-09 NOTE — PLAN
[FreeTextEntry1] : O: \par Vasc: DP 1/4 b/l PT non-palpable b/l, CFT<3x10, TG warm to warm\par Derm: wound as described above no malodor , no probe to bone, moderate drainage, slight macerated borders on right medial malleolus, mild serous drainage. fibrogranular wound base\par Neuro: protective sensation intact to the level of the digits \par \par A: Ulcer medial malleolus right ankle, improvement in size\par \par P:\par Pt evaluated and chart created \par Verbal consent was given for debridement\par Wound debrided down to and including subcutaneous tissue with removal of fibrotic tissue with sterile #15 blade to reveal granular wound bed and healthy bleeding, \par Applied santyl and adaptic \par Applied multilayer compressive dressing consisted of webril, ace and coban\par Recommended EO2\par Pt and aid gave verbal understanding \par RTC 1 week for re-evaluation

## 2020-01-10 DIAGNOSIS — L97.312 NON-PRESSURE CHRONIC ULCER OF RIGHT ANKLE WITH FAT LAYER EXPOSED: ICD-10-CM

## 2020-01-16 ENCOUNTER — OUTPATIENT (OUTPATIENT)
Dept: OUTPATIENT SERVICES | Facility: HOSPITAL | Age: 85
LOS: 1 days | End: 2020-01-16
Payer: MEDICARE

## 2020-01-16 ENCOUNTER — APPOINTMENT (OUTPATIENT)
Dept: WOUND CARE | Facility: CLINIC | Age: 85
End: 2020-01-16

## 2020-01-16 VITALS
OXYGEN SATURATION: 99 % | RESPIRATION RATE: 18 BRPM | HEIGHT: 70 IN | DIASTOLIC BLOOD PRESSURE: 84 MMHG | SYSTOLIC BLOOD PRESSURE: 152 MMHG | HEART RATE: 70 BPM | TEMPERATURE: 99.1 F

## 2020-01-16 DIAGNOSIS — Z00.00 ENCOUNTER FOR GENERAL ADULT MEDICAL EXAMINATION WITHOUT ABNORMAL FINDINGS: ICD-10-CM

## 2020-01-16 DIAGNOSIS — L97.312 NON-PRESSURE CHRONIC ULCER OF RIGHT ANKLE WITH FAT LAYER EXPOSED: ICD-10-CM

## 2020-01-16 DIAGNOSIS — I73.9 PERIPHERAL VASCULAR DISEASE, UNSPECIFIED: ICD-10-CM

## 2020-01-16 PROCEDURE — 11042 DBRDMT SUBQ TIS 1ST 20SQCM/<: CPT

## 2020-01-16 RX ORDER — COLLAGENASE SANTYL 250 [ARB'U]/G
250 OINTMENT TOPICAL DAILY
Qty: 1 | Refills: 0 | Status: ACTIVE | COMMUNITY
Start: 2020-01-16 | End: 1900-01-01

## 2020-01-16 NOTE — HISTORY OF PRESENT ILLNESS
[FreeTextEntry1] : 88 yr old DM female pt presents to the clinic complaining of medial malleolus ulcer on the right ankle. Patient has been seen in clinic for medial malleolus ulcer with improvement.  Pt presents with her aid in a wheel chair. Pt states shes had the ulcer for about a year. Seen by vascular doctor, no intervention. Pt denies F/V/C/N/SOB. pt states an improvement in pain and ulcer size. Apligraf #5 was previously, but does not currently have an apligraf in place.  Pt does not check her blood sugar   [facility]

## 2020-01-16 NOTE — PLAN
[FreeTextEntry1] : O: \par Vasc: DP 1/4 b/l PT non-palpable b/l, CFT<3x10, TG warm to warm\par Derm: wound as described above no malodor , no probe to bone, moderate drainage, slight macerated borders on right medial malleolus, mild serous drainage. fibrogranular wound base\par Neuro: protective sensation intact to the level of the digits \par \par A: Ulcer medial malleolus right ankle, improvement in size\par \par P:\par Pt evaluated and chart created \par Verbal consent was given for debridement\par Wound debrided down to and including subcutaneous tissue with removal of fibrotic tissue with sterile #15 blade to reveal granular wound bed and healthy bleeding, \par Applied santyl and adaptic \par Home dressing changes with santyl, adpatic, 4x4 gauze, kerlix, ACE to be changed every other day.\par Recommended EO2\par Pt and aid gave verbal understanding \par RTC 1 week for re-evaluation

## 2020-01-16 NOTE — PHYSICAL EXAM
[4 x 4] : 4 x 4  [FreeTextEntry1] : medial malleolus [FreeTextEntry2] : 2.4cm [de-identified] : victor manuel [FreeTextEntry4] : 0.2cm [FreeTextEntry3] : 1.8  cm [de-identified] : judie bran. ace, coban [de-identified] : santyl [TWNoteComboBox1] : Right [TWNoteComboBox4] : Small [TWNoteComboBox2] : 2 [TWNoteComboBox5] : No [TWNoteComboBox6] : Venous [de-identified] : <20% [de-identified] : None [de-identified] : 50% [de-identified] : Yes [TWNoteComboBox7] : Melissa [de-identified] : Debridement performed of all devitalized tissue to bleeding viable tissue

## 2020-01-23 ENCOUNTER — APPOINTMENT (OUTPATIENT)
Dept: WOUND CARE | Facility: CLINIC | Age: 85
End: 2020-01-23

## 2020-01-23 ENCOUNTER — OUTPATIENT (OUTPATIENT)
Dept: OUTPATIENT SERVICES | Facility: HOSPITAL | Age: 85
LOS: 1 days | End: 2020-01-23
Payer: MEDICARE

## 2020-01-23 VITALS
TEMPERATURE: 98 F | HEIGHT: 70 IN | DIASTOLIC BLOOD PRESSURE: 66 MMHG | OXYGEN SATURATION: 100 % | RESPIRATION RATE: 18 BRPM | SYSTOLIC BLOOD PRESSURE: 138 MMHG | HEART RATE: 60 BPM

## 2020-01-23 DIAGNOSIS — Z00.00 ENCOUNTER FOR GENERAL ADULT MEDICAL EXAMINATION WITHOUT ABNORMAL FINDINGS: ICD-10-CM

## 2020-01-23 PROCEDURE — 11042 DBRDMT SUBQ TIS 1ST 20SQCM/<: CPT

## 2020-01-24 DIAGNOSIS — L97.412 NON-PRESSURE CHRONIC ULCER OF RIGHT HEEL AND MIDFOOT WITH FAT LAYER EXPOSED: ICD-10-CM

## 2020-01-30 ENCOUNTER — OUTPATIENT (OUTPATIENT)
Dept: OUTPATIENT SERVICES | Facility: HOSPITAL | Age: 85
LOS: 1 days | End: 2020-01-30
Payer: MEDICARE

## 2020-01-30 ENCOUNTER — APPOINTMENT (OUTPATIENT)
Dept: WOUND CARE | Facility: CLINIC | Age: 85
End: 2020-01-30

## 2020-01-30 VITALS
OXYGEN SATURATION: 100 % | HEIGHT: 70 IN | SYSTOLIC BLOOD PRESSURE: 154 MMHG | HEART RATE: 71 BPM | TEMPERATURE: 98.5 F | DIASTOLIC BLOOD PRESSURE: 69 MMHG | RESPIRATION RATE: 18 BRPM

## 2020-01-30 DIAGNOSIS — L97.312 NON-PRESSURE CHRONIC ULCER OF RIGHT ANKLE WITH FAT LAYER EXPOSED: ICD-10-CM

## 2020-01-30 DIAGNOSIS — Z00.00 ENCOUNTER FOR GENERAL ADULT MEDICAL EXAMINATION WITHOUT ABNORMAL FINDINGS: ICD-10-CM

## 2020-01-30 DIAGNOSIS — E11.621 TYPE 2 DIABETES MELLITUS WITH FOOT ULCER: ICD-10-CM

## 2020-01-30 PROCEDURE — 11042 DBRDMT SUBQ TIS 1ST 20SQCM/<: CPT

## 2020-01-30 NOTE — PHYSICAL EXAM
[4 x 4] : 4 x 4  [FreeTextEntry1] : medial malleolus [de-identified] : victor manuel [FreeTextEntry2] : 3.0 cm [FreeTextEntry3] : 2.0  cm [FreeTextEntry4] : 0.2cm [de-identified] : santyl [de-identified] : adaptic, kerlix, ace [TWNoteComboBox1] : Right [TWNoteComboBox2] : 2 [TWNoteComboBox4] : Small [TWNoteComboBox5] : No [TWNoteComboBox6] : Venous [de-identified] : None [de-identified] : <20% [de-identified] : 50% [de-identified] : Yes [TWNoteComboBox7] : Melissa [de-identified] : Debridement performed of all devitalized tissue to bleeding viable tissue

## 2020-01-30 NOTE — PHYSICAL EXAM
[4 x 4] : 4 x 4  [de-identified] : victor manuel [FreeTextEntry1] : medial malleolus [FreeTextEntry2] : 2.2cm [FreeTextEntry3] : 1.8  cm [FreeTextEntry4] : 0.2cm [de-identified] : santyl [TWNoteComboBox2] : 2 [de-identified] : aiden bran. ace, coban [TWNoteComboBox1] : Right [TWNoteComboBox4] : Small [TWNoteComboBox5] : No [TWNoteComboBox6] : Venous [de-identified] : 50% [de-identified] : <20% [de-identified] : None [de-identified] : Debridement performed of all devitalized tissue to bleeding viable tissue [de-identified] : Yes [TWNoteComboBox7] : Melissa

## 2020-01-30 NOTE — HISTORY OF PRESENT ILLNESS
[FreeTextEntry1] : 88 yr old DM female pt presents to the clinic complaining of medial malleolus ulcer on the right ankle. Patient has been seen in clinic for medial malleolus ulcer with improvement.  Pt presents with her aid in a wheel chair. Pt states shes had the ulcer for about a year. Seen by vascular doctor, no intervention. Pt denies F/V/C/N/SOB. pt states an improvement in pain and ulcer size. Apligraf #5 was previously, but does not currently have an apligraf in place.  Pt does not check her blood sugar. No new complaints at today's visit.

## 2020-01-30 NOTE — PLAN
[FreeTextEntry1] : O: \par Vasc: DP 1/4 b/l PT non-palpable b/l, CFT<3x10, TG warm to warm\par Derm: wound as described above no malodor , no probe to bone, moderate drainage, slight macerated borders on right medial malleolus, mild serous drainage. fibrogranular wound base\par Neuro: protective sensation intact to the level of the digits \par \par A: Ulcer medial malleolus right ankle, improvement in size\par \par P:\par Pt evaluated and chart created \par Verbal consent was given for debridement\par Wound debrided down to and including subcutaneous tissue with removal of fibrotic tissue with sterile #15 blade to reveal granular wound bed and healthy bleeding, \par Applied santyl and adaptic \par Home dressing changes with santyl, adpatic, 4x4 gauze, kerlix, ACE to be changed every other day.\par Pt and aid gave verbal understanding \par RTC 1 week for re-evaluation. \par

## 2020-02-13 ENCOUNTER — OUTPATIENT (OUTPATIENT)
Dept: OUTPATIENT SERVICES | Facility: HOSPITAL | Age: 85
LOS: 1 days | End: 2020-02-13
Payer: MEDICARE

## 2020-02-13 ENCOUNTER — APPOINTMENT (OUTPATIENT)
Dept: WOUND CARE | Facility: CLINIC | Age: 85
End: 2020-02-13

## 2020-02-13 VITALS
HEART RATE: 55 BPM | RESPIRATION RATE: 18 BRPM | SYSTOLIC BLOOD PRESSURE: 122 MMHG | HEIGHT: 70 IN | DIASTOLIC BLOOD PRESSURE: 76 MMHG | OXYGEN SATURATION: 100 % | TEMPERATURE: 98.1 F

## 2020-02-13 DIAGNOSIS — Z00.00 ENCOUNTER FOR GENERAL ADULT MEDICAL EXAMINATION WITHOUT ABNORMAL FINDINGS: ICD-10-CM

## 2020-02-13 PROCEDURE — G0463: CPT

## 2020-02-13 PROCEDURE — 11042 DBRDMT SUBQ TIS 1ST 20SQCM/<: CPT

## 2020-02-13 NOTE — HISTORY OF PRESENT ILLNESS
[FreeTextEntry1] : 88 yr old DM female pt presents to the clinic complaining of medial malleolus ulcer on the right ankle. Patient has been seen in clinic for medial malleolus ulcer with improvement.  Pt presents with her aid in a wheel chair. Pt states shes had the ulcer for about a year. Seen by vascular doctor, no intervention. Pt denies F/V/C/N/SOB. pt states an improvement in pain and ulcer size. Patient states she has an appointment with primary care today. Apligraf #5 was previously, but does not currently have an apligraf in place.  Pt does not check her blood sugar. No new complaints at today's visit.

## 2020-02-13 NOTE — PHYSICAL EXAM
[4 x 4] : 4 x 4  [FreeTextEntry1] : medial malleolus [FreeTextEntry2] : 3.0 cm [de-identified] : victor manuel [FreeTextEntry3] : 0.8 cm [de-identified] : santyl [FreeTextEntry4] : 0.2cm [de-identified] : adaptic, kerlix, ace [TWNoteComboBox2] : 2 [TWNoteComboBox4] : Small [TWNoteComboBox1] : Right [de-identified] : None [TWNoteComboBox5] : No [TWNoteComboBox6] : Venous [de-identified] : <20% [de-identified] : 50% [de-identified] : Yes [TWNoteComboBox7] : Melissa [de-identified] : Debridement performed of all devitalized tissue to bleeding viable tissue

## 2020-02-14 DIAGNOSIS — L97.322 NON-PRESSURE CHRONIC ULCER OF LEFT ANKLE WITH FAT LAYER EXPOSED: ICD-10-CM

## 2020-03-05 ENCOUNTER — APPOINTMENT (OUTPATIENT)
Dept: WOUND CARE | Facility: CLINIC | Age: 85
End: 2020-03-05

## 2020-04-01 ENCOUNTER — OUTPATIENT (OUTPATIENT)
Dept: OUTPATIENT SERVICES | Facility: HOSPITAL | Age: 85
LOS: 1 days | End: 2020-04-01
Payer: MEDICARE

## 2020-04-01 PROCEDURE — G9001: CPT

## 2020-04-13 ENCOUNTER — INPATIENT (INPATIENT)
Facility: HOSPITAL | Age: 85
LOS: 6 days | Discharge: HOME | DRG: 300 | End: 2020-04-20
Attending: INTERNAL MEDICINE | Admitting: INTERNAL MEDICINE
Payer: MEDICARE

## 2020-04-13 ENCOUNTER — APPOINTMENT (OUTPATIENT)
Dept: ULTRASOUND IMAGING | Facility: HOSPITAL | Age: 85
End: 2020-04-13

## 2020-04-13 VITALS
HEART RATE: 73 BPM | SYSTOLIC BLOOD PRESSURE: 135 MMHG | DIASTOLIC BLOOD PRESSURE: 84 MMHG | TEMPERATURE: 98 F | RESPIRATION RATE: 18 BRPM | OXYGEN SATURATION: 97 % | WEIGHT: 293 LBS | HEIGHT: 70 IN

## 2020-04-13 DIAGNOSIS — M79.89 OTHER SPECIFIED SOFT TISSUE DISORDERS: ICD-10-CM

## 2020-04-13 DIAGNOSIS — I48.92 UNSPECIFIED ATRIAL FLUTTER: ICD-10-CM

## 2020-04-13 LAB
ALBUMIN SERPL ELPH-MCNC: 3.5 G/DL — SIGNIFICANT CHANGE UP (ref 3.5–5)
ALP SERPL-CCNC: 137 U/L — HIGH (ref 40–120)
ALT FLD-CCNC: 33 U/L DA — SIGNIFICANT CHANGE UP (ref 10–60)
ANION GAP SERPL CALC-SCNC: 11 MMOL/L — SIGNIFICANT CHANGE UP (ref 5–17)
APTT BLD: 34.4 SEC — SIGNIFICANT CHANGE UP (ref 27.5–36.3)
AST SERPL-CCNC: 20 U/L — SIGNIFICANT CHANGE UP (ref 10–40)
BASE EXCESS BLDV CALC-SCNC: -1.6 MMOL/L — SIGNIFICANT CHANGE UP (ref -2–2)
BILIRUB SERPL-MCNC: 0.9 MG/DL — SIGNIFICANT CHANGE UP (ref 0.2–1.2)
BUN SERPL-MCNC: 27 MG/DL — HIGH (ref 7–18)
CALCIUM SERPL-MCNC: 8.9 MG/DL — SIGNIFICANT CHANGE UP (ref 8.4–10.5)
CHLORIDE SERPL-SCNC: 94 MMOL/L — LOW (ref 96–108)
CK SERPL-CCNC: 141 U/L — SIGNIFICANT CHANGE UP (ref 21–215)
CO2 SERPL-SCNC: 24 MMOL/L — SIGNIFICANT CHANGE UP (ref 22–31)
CREAT SERPL-MCNC: 1.9 MG/DL — HIGH (ref 0.5–1.3)
GLUCOSE SERPL-MCNC: 197 MG/DL — HIGH (ref 70–99)
HCO3 BLDV-SCNC: 24 MMOL/L — SIGNIFICANT CHANGE UP (ref 21–29)
HCT VFR BLD CALC: 43.2 % — SIGNIFICANT CHANGE UP (ref 34.5–45)
HGB BLD-MCNC: 13.9 G/DL — SIGNIFICANT CHANGE UP (ref 11.5–15.5)
HOROWITZ INDEX BLDV+IHG-RTO: 100 — SIGNIFICANT CHANGE UP
INR BLD: 1.18 RATIO — HIGH (ref 0.88–1.16)
LACTATE SERPL-SCNC: 4.5 MMOL/L — CRITICAL HIGH (ref 0.7–2)
MCHC RBC-ENTMCNC: 30.1 PG — SIGNIFICANT CHANGE UP (ref 27–34)
MCHC RBC-ENTMCNC: 32.2 GM/DL — SIGNIFICANT CHANGE UP (ref 32–36)
MCV RBC AUTO: 93.5 FL — SIGNIFICANT CHANGE UP (ref 80–100)
NRBC # BLD: 0 /100 WBCS — SIGNIFICANT CHANGE UP (ref 0–0)
NT-PROBNP SERPL-SCNC: 409 PG/ML — SIGNIFICANT CHANGE UP (ref 0–450)
PCO2 BLDV: 47 MMHG — SIGNIFICANT CHANGE UP (ref 35–50)
PH BLDV: 7.33 — LOW (ref 7.35–7.45)
PLATELET # BLD AUTO: 345 K/UL — SIGNIFICANT CHANGE UP (ref 150–400)
PO2 BLDV: 26 MMHG — SIGNIFICANT CHANGE UP (ref 25–45)
POTASSIUM SERPL-MCNC: 4.2 MMOL/L — SIGNIFICANT CHANGE UP (ref 3.5–5.3)
POTASSIUM SERPL-SCNC: 4.2 MMOL/L — SIGNIFICANT CHANGE UP (ref 3.5–5.3)
PROT SERPL-MCNC: 8.8 G/DL — HIGH (ref 6–8.3)
PROTHROM AB SERPL-ACNC: 13.4 SEC — HIGH (ref 10–12.9)
RBC # BLD: 4.62 M/UL — SIGNIFICANT CHANGE UP (ref 3.8–5.2)
RBC # FLD: 13 % — SIGNIFICANT CHANGE UP (ref 10.3–14.5)
SAO2 % BLDV: 34 % — LOW (ref 67–88)
SODIUM SERPL-SCNC: 129 MMOL/L — LOW (ref 135–145)
T4 AB SER-ACNC: 9 UG/DL — SIGNIFICANT CHANGE UP (ref 4.6–12)
TROPONIN I SERPL-MCNC: 0.04 NG/ML — SIGNIFICANT CHANGE UP (ref 0–0.04)
TSH SERPL-MCNC: 1.6 UU/ML — SIGNIFICANT CHANGE UP (ref 0.34–4.82)
WBC # BLD: 13.29 K/UL — HIGH (ref 3.8–10.5)
WBC # FLD AUTO: 13.29 K/UL — HIGH (ref 3.8–10.5)

## 2020-04-13 PROCEDURE — 93971 EXTREMITY STUDY: CPT | Mod: 26,LT

## 2020-04-13 PROCEDURE — 71045 X-RAY EXAM CHEST 1 VIEW: CPT | Mod: 26

## 2020-04-13 PROCEDURE — 73562 X-RAY EXAM OF KNEE 3: CPT | Mod: 26,LT

## 2020-04-13 PROCEDURE — 70450 CT HEAD/BRAIN W/O DYE: CPT | Mod: 26

## 2020-04-13 PROCEDURE — 99285 EMERGENCY DEPT VISIT HI MDM: CPT

## 2020-04-13 PROCEDURE — 72170 X-RAY EXAM OF PELVIS: CPT | Mod: 26

## 2020-04-13 RX ORDER — ENOXAPARIN SODIUM 100 MG/ML
100 INJECTION SUBCUTANEOUS ONCE
Refills: 0 | Status: COMPLETED | OUTPATIENT
Start: 2020-04-13 | End: 2020-04-13

## 2020-04-13 RX ORDER — DILTIAZEM HCL 120 MG
30 CAPSULE, EXT RELEASE 24 HR ORAL ONCE
Refills: 0 | Status: COMPLETED | OUTPATIENT
Start: 2020-04-13 | End: 2020-04-13

## 2020-04-13 RX ORDER — ANASTROZOLE 1 MG/1
1 TABLET ORAL DAILY
Refills: 0 | Status: DISCONTINUED | OUTPATIENT
Start: 2020-04-13 | End: 2020-04-20

## 2020-04-13 RX ORDER — SODIUM CHLORIDE 9 MG/ML
1000 INJECTION INTRAMUSCULAR; INTRAVENOUS; SUBCUTANEOUS ONCE
Refills: 0 | Status: COMPLETED | OUTPATIENT
Start: 2020-04-13 | End: 2020-04-13

## 2020-04-13 RX ORDER — TRAMADOL HYDROCHLORIDE 50 MG/1
50 TABLET ORAL EVERY 8 HOURS
Refills: 0 | Status: DISCONTINUED | OUTPATIENT
Start: 2020-04-13 | End: 2020-04-20

## 2020-04-13 RX ORDER — SIMVASTATIN 20 MG/1
20 TABLET, FILM COATED ORAL AT BEDTIME
Refills: 0 | Status: DISCONTINUED | OUTPATIENT
Start: 2020-04-13 | End: 2020-04-14

## 2020-04-13 RX ORDER — CARVEDILOL PHOSPHATE 80 MG/1
6.25 CAPSULE, EXTENDED RELEASE ORAL EVERY 12 HOURS
Refills: 0 | Status: DISCONTINUED | OUTPATIENT
Start: 2020-04-13 | End: 2020-04-14

## 2020-04-13 RX ORDER — MORPHINE SULFATE 50 MG/1
1 CAPSULE, EXTENDED RELEASE ORAL ONCE
Refills: 0 | Status: DISCONTINUED | OUTPATIENT
Start: 2020-04-13 | End: 2020-04-13

## 2020-04-13 RX ORDER — ENOXAPARIN SODIUM 100 MG/ML
140 INJECTION SUBCUTANEOUS
Refills: 0 | Status: DISCONTINUED | OUTPATIENT
Start: 2020-04-13 | End: 2020-04-14

## 2020-04-13 RX ORDER — SODIUM CHLORIDE 9 MG/ML
1000 INJECTION INTRAMUSCULAR; INTRAVENOUS; SUBCUTANEOUS
Refills: 0 | Status: DISCONTINUED | OUTPATIENT
Start: 2020-04-13 | End: 2020-04-14

## 2020-04-13 RX ORDER — MORPHINE SULFATE 50 MG/1
2 CAPSULE, EXTENDED RELEASE ORAL ONCE
Refills: 0 | Status: DISCONTINUED | OUTPATIENT
Start: 2020-04-13 | End: 2020-04-13

## 2020-04-13 RX ORDER — DILTIAZEM HCL 120 MG
20 CAPSULE, EXT RELEASE 24 HR ORAL ONCE
Refills: 0 | Status: COMPLETED | OUTPATIENT
Start: 2020-04-13 | End: 2020-04-13

## 2020-04-13 RX ORDER — FUROSEMIDE 40 MG
40 TABLET ORAL DAILY
Refills: 0 | Status: DISCONTINUED | OUTPATIENT
Start: 2020-04-13 | End: 2020-04-14

## 2020-04-13 RX ORDER — ACETAMINOPHEN 500 MG
650 TABLET ORAL EVERY 6 HOURS
Refills: 0 | Status: DISCONTINUED | OUTPATIENT
Start: 2020-04-13 | End: 2020-04-20

## 2020-04-13 RX ORDER — TRAMADOL HYDROCHLORIDE 50 MG/1
25 TABLET ORAL EVERY 6 HOURS
Refills: 0 | Status: DISCONTINUED | OUTPATIENT
Start: 2020-04-13 | End: 2020-04-20

## 2020-04-13 RX ORDER — ENOXAPARIN SODIUM 100 MG/ML
30 INJECTION SUBCUTANEOUS ONCE
Refills: 0 | Status: DISCONTINUED | OUTPATIENT
Start: 2020-04-13 | End: 2020-04-13

## 2020-04-13 RX ADMIN — Medication 20 MILLIGRAM(S): at 15:16

## 2020-04-13 RX ADMIN — Medication 30 MILLIGRAM(S): at 20:16

## 2020-04-13 RX ADMIN — MORPHINE SULFATE 2 MILLIGRAM(S): 50 CAPSULE, EXTENDED RELEASE ORAL at 17:25

## 2020-04-13 RX ADMIN — SODIUM CHLORIDE 1000 MILLILITER(S): 9 INJECTION INTRAMUSCULAR; INTRAVENOUS; SUBCUTANEOUS at 13:51

## 2020-04-13 RX ADMIN — MORPHINE SULFATE 1 MILLIGRAM(S): 50 CAPSULE, EXTENDED RELEASE ORAL at 17:00

## 2020-04-13 RX ADMIN — ENOXAPARIN SODIUM 100 MILLIGRAM(S): 100 INJECTION SUBCUTANEOUS at 17:26

## 2020-04-13 NOTE — ED ADULT NURSE NOTE - OBJECTIVE STATEMENT
Pt arrived from home , BIBA for generalized weakness, LT leg edema and pain, s/p fall this morning as per EMS  Famil yhelped her get up Pt arrived from home , BIBA for generalized weakness, LT leg edema and pain, s/p fall this morning as per EMS  Family helped her get up

## 2020-04-13 NOTE — ED ADULT TRIAGE NOTE - CHIEF COMPLAINT QUOTE
c/o hypotension. Presents from home via EMS after call by family for generalized weakness and left leg edema. Pt answering questions in triage, reports falling going to bathroom this morning. Pt is blind. BP 64/21, 'S on scene per EMS

## 2020-04-13 NOTE — H&P ADULT - NSHPSOCIALHISTORY_GEN_ALL_CORE
from home   denies smoking   occasional alcohol intake   most wheelchair bound, minimally ambulates with walker

## 2020-04-13 NOTE — H&P ADULT - PROBLEM SELECTOR PLAN 3
- p/w fall, on right side   - Xray hip, CT head; neg for acute changes   - fall precaution   - PT consult

## 2020-04-13 NOTE — ED PROVIDER NOTE - OBJECTIVE STATEMENT
87 yo F pmh of blindness, DM, HTN, and arthritis presents by EMS from home with c/o generalized weakness. Pt states she was walking to bathroom and fell due to weakness. c/o L leg swelling x a few days. Denies pain, fever, SOB, syncope, other acute complaints.

## 2020-04-13 NOTE — H&P ADULT - ASSESSMENT
87 yo F from home, with pmh of blindness,  HTN, pre diabetes,  breast cancer, arthritis presented by EMS with c/o generalized weakness. Pt states she was walking to bathroom and fell due to weakness. Patient reports falling on her right side, denies hitting head. Also, reports severe left leg swelling and pain for  past 3 days. Denies pain, fever, SOB, syncope, other acute complaints.Spoke to family member over phone,[phone],  who informed me that patient is mostly wheel chair bound, minimally ambulates with walker.   full code    Admittd for Atrial flutter and DVT

## 2020-04-13 NOTE — H&P ADULT - NSICDXPASTMEDICALHX_GEN_ALL_CORE_FT
PAST MEDICAL HISTORY:  Arthritis     Breast cancer right    History of hypertension     Legally blind     Pre-diabetes

## 2020-04-13 NOTE — ED PROVIDER NOTE - CLINICAL SUMMARY MEDICAL DECISION MAKING FREE TEXT BOX
87 yo F with generalized weakness and fall. Hypotensive with EMS, but BP on arrival to /70 w/o intervention. Plan - [facility]SBG, EKG, labs, CT head, xray chest/pelvis, reassess.

## 2020-04-13 NOTE — ED PROVIDER NOTE - PROGRESS NOTE DETAILS
EKG - rate 144, aflutter   labs - WBC 13, Cr 1.9  CXR - lungs clear  US LE doppler - +for DVT  CT head - pending EKG - rate 144, aflutter   labs - WBC 13, Cr 1.9  CXR - lungs clear; US LE doppler - +for DVT; CT head - no hemorrhage   VS improved after diltiazem. Endorsed to MAR and PCP Dr Bianchi for admission.

## 2020-04-13 NOTE — ED PROVIDER NOTE - PHYSICAL EXAMINATION
GENERAL: well appearing, no acute distress   HEAD: atraumatic   EYES: absent vision   ENT: moist oral mucosa   CARDIAC: RRR, no edema, distal pulses present   RESPIRATORY: lungs CTAB, no increased work of breathing   GASTROINTESTINAL: no abdominal tenderness, no rebound or guarding, bowel sounds presents  GENITOURINARY: no CVA tenderness   MUSCULOSKELETAL: no deformity   NEUROLOGICAL: AAOx3, CN's II-XII intact except CN II vision, strength 5/5 bilateral UE and LE, sensation intact to light touch  SKIN: intact   PSYCHIATRIC: cooperative  HEME LYMPH: no lymphadenopathy

## 2020-04-13 NOTE — ED PROVIDER NOTE - NS ED ROS FT
CONSTITUTIONAL: no fever, no chills   EYES: no visual changes, no eye pain   ENMT: no nasal congestion, no throat pain  CARDIOVASCULAR: no chest pain, +edema, no palpitations   RESPIRATORY: no shortness of breath, no cough   GASTROINTESTINAL: no abdominal pain, no nausea, no vomiting, no diarrhea, no constipation   GENITOURINARY: no dysuria, no frequency  MUSCULOSKELETAL: no joint pains, no myalgias, no back pain   SKIN: no rashes  NEUROLOGICAL: +weakness, no headache, no dizziness, no slurred speech, no syncope   PSYCHIATRIC: no known mental health illness   HEME/LYMPH: no lymphadenopathy      All other ROS negative except as per HPI

## 2020-04-13 NOTE — H&P ADULT - HISTORY OF PRESENT ILLNESS
87 yo F from home, with pmh of blindness,  HTN, pre diabetes,  breast cancer, arthritis presented by EMS with c/o generalized weakness. Pt states she was walking to bathroom and fell due to weakness. Patient reports falling on her right side, denies hitting head. Also, reports severe left leg swelling and pain for  past 3 days. Denies pain, fever, SOB, syncope, other acute complaints.  Spoke to family member over phone,[phone],  who informed me that patient is mostly wheel chair bound, minimally ambulates with walker.     GOC: JOSE

## 2020-04-13 NOTE — ED ADULT TRIAGE NOTE - ESI TRIAGE ACUITY LEVEL, MLM
2
Social History:    Marital Status:  (   )    (   ) Single    (X)    (  )   Occupation: not working  Lives with: (X) alone  (  ) children   (  ) spouse   (  ) parents  (  ) other    Substance Use (street drugs): (X) never used  (  ) other:  Tobacco Usage:  (X) never smoked   (   ) former smoker   (   ) current smoker  (     ) pack year  (        ) last cigarette date  Alcohol Usage: occasional

## 2020-04-13 NOTE — H&P ADULT - PROBLEM SELECTOR PLAN 1
- p/w atrial flutter withrapid rate, new onset  - Ed coursE; cardizem 20 mg x1, 30 mg x1   - presently rate controlled   - TSH: WNL   - will c/w home med; Coreg 6.25 mg BID   - ECho   -Lovenox full dose   - DR Valle consulted   Tele monitoring

## 2020-04-13 NOTE — H&P ADULT - PROBLEM SELECTOR PLAN 2
- Left leg: dvt, most likely due to immobilization,  - started on full dose Lovenox   - pain management with tylenol and Tramadol for mild to severe pain PRN   - PRIMARY TEAM TO CONSULT VASCULAR SURGERY IN AM in setting of severe left leg swelling

## 2020-04-13 NOTE — ED ADULT NURSE NOTE - NSIMPLEMENTINTERV_GEN_ALL_ED
Implemented All Fall Risk Interventions:  [place] to call system. Call bell, personal items and telephone within reach. Instruct patient to call for assistance. Room bathroom lighting operational. Non-slip footwear when patient is off stretcher. Physically safe environment: no spills, clutter or unnecessary equipment. Stretcher in lowest position, wheels locked, appropriate side rails in place. Provide visual cue, wrist band, yellow gown, etc. Monitor gait and stability. Monitor for mental status changes and reorient to person, place, and time. Review medications for side effects contributing to fall risk. Reinforce activity limits and safety measures with patient and family.

## 2020-04-14 DIAGNOSIS — E87.1 HYPO-OSMOLALITY AND HYPONATREMIA: ICD-10-CM

## 2020-04-14 DIAGNOSIS — Z29.9 ENCOUNTER FOR PROPHYLACTIC MEASURES, UNSPECIFIED: ICD-10-CM

## 2020-04-14 DIAGNOSIS — N17.9 ACUTE KIDNEY FAILURE, UNSPECIFIED: ICD-10-CM

## 2020-04-14 DIAGNOSIS — I48.92 UNSPECIFIED ATRIAL FLUTTER: ICD-10-CM

## 2020-04-14 DIAGNOSIS — I10 ESSENTIAL (PRIMARY) HYPERTENSION: ICD-10-CM

## 2020-04-14 DIAGNOSIS — I82.409 ACUTE EMBOLISM AND THROMBOSIS OF UNSPECIFIED DEEP VEINS OF UNSPECIFIED LOWER EXTREMITY: ICD-10-CM

## 2020-04-14 DIAGNOSIS — E78.5 HYPERLIPIDEMIA, UNSPECIFIED: ICD-10-CM

## 2020-04-14 DIAGNOSIS — H54.8 LEGAL BLINDNESS, AS DEFINED IN USA: ICD-10-CM

## 2020-04-14 DIAGNOSIS — C50.919 MALIGNANT NEOPLASM OF UNSPECIFIED SITE OF UNSPECIFIED FEMALE BREAST: ICD-10-CM
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
## 2020-04-14 LAB
24R-OH-CALCIDIOL SERPL-MCNC: 30.4 NG/ML — SIGNIFICANT CHANGE UP (ref 30–80)
ALBUMIN SERPL ELPH-MCNC: 3 G/DL — LOW (ref 3.5–5)
ALBUMIN SERPL ELPH-MCNC: 3 G/DL — LOW (ref 3.5–5)
ALP SERPL-CCNC: 121 U/L — HIGH (ref 40–120)
ALP SERPL-CCNC: 124 U/L — HIGH (ref 40–120)
ALT FLD-CCNC: 27 U/L DA — SIGNIFICANT CHANGE UP (ref 10–60)
ALT FLD-CCNC: 31 U/L DA — SIGNIFICANT CHANGE UP (ref 10–60)
ANION GAP SERPL CALC-SCNC: 11 MMOL/L — SIGNIFICANT CHANGE UP (ref 5–17)
ANION GAP SERPL CALC-SCNC: 7 MMOL/L — SIGNIFICANT CHANGE UP (ref 5–17)
AST SERPL-CCNC: 26 U/L — SIGNIFICANT CHANGE UP (ref 10–40)
AST SERPL-CCNC: 38 U/L — SIGNIFICANT CHANGE UP (ref 10–40)
BILIRUB SERPL-MCNC: 0.7 MG/DL — SIGNIFICANT CHANGE UP (ref 0.2–1.2)
BILIRUB SERPL-MCNC: 1.1 MG/DL — SIGNIFICANT CHANGE UP (ref 0.2–1.2)
BUN SERPL-MCNC: 33 MG/DL — HIGH (ref 7–18)
BUN SERPL-MCNC: 38 MG/DL — HIGH (ref 7–18)
CALCIUM SERPL-MCNC: 8.6 MG/DL — SIGNIFICANT CHANGE UP (ref 8.4–10.5)
CALCIUM SERPL-MCNC: 8.7 MG/DL — SIGNIFICANT CHANGE UP (ref 8.4–10.5)
CHLORIDE SERPL-SCNC: 97 MMOL/L — SIGNIFICANT CHANGE UP (ref 96–108)
CHLORIDE SERPL-SCNC: 97 MMOL/L — SIGNIFICANT CHANGE UP (ref 96–108)
CHOLEST SERPL-MCNC: 245 MG/DL — HIGH (ref 10–199)
CO2 SERPL-SCNC: 21 MMOL/L — LOW (ref 22–31)
CO2 SERPL-SCNC: 28 MMOL/L — SIGNIFICANT CHANGE UP (ref 22–31)
CREAT SERPL-MCNC: 1.32 MG/DL — HIGH (ref 0.5–1.3)
CREAT SERPL-MCNC: 1.52 MG/DL — HIGH (ref 0.5–1.3)
ESTIMATED AVERAGE GLUCOSE: 137 MG/DL — HIGH (ref 68–114)
FOLATE SERPL-MCNC: >20 NG/ML — SIGNIFICANT CHANGE UP
GLUCOSE SERPL-MCNC: 125 MG/DL — HIGH (ref 70–99)
GLUCOSE SERPL-MCNC: 158 MG/DL — HIGH (ref 70–99)
HBA1C BLD-MCNC: 6.4 % — HIGH (ref 4–5.6)
HDLC SERPL-MCNC: 57 MG/DL — SIGNIFICANT CHANGE UP
LACTATE SERPL-SCNC: 1.9 MMOL/L — SIGNIFICANT CHANGE UP (ref 0.7–2)
LACTATE SERPL-SCNC: 2.4 MMOL/L — HIGH (ref 0.7–2)
LIPID PNL WITH DIRECT LDL SERPL: 161 MG/DL — SIGNIFICANT CHANGE UP
MAGNESIUM SERPL-MCNC: 2.3 MG/DL — SIGNIFICANT CHANGE UP (ref 1.6–2.6)
PHOSPHATE SERPL-MCNC: 3.7 MG/DL — SIGNIFICANT CHANGE UP (ref 2.5–4.5)
POTASSIUM SERPL-MCNC: 4.6 MMOL/L — SIGNIFICANT CHANGE UP (ref 3.5–5.3)
POTASSIUM SERPL-MCNC: 5.3 MMOL/L — SIGNIFICANT CHANGE UP (ref 3.5–5.3)
POTASSIUM SERPL-SCNC: 4.6 MMOL/L — SIGNIFICANT CHANGE UP (ref 3.5–5.3)
POTASSIUM SERPL-SCNC: 5.3 MMOL/L — SIGNIFICANT CHANGE UP (ref 3.5–5.3)
PROT SERPL-MCNC: 7.9 G/DL — SIGNIFICANT CHANGE UP (ref 6–8.3)
PROT SERPL-MCNC: 8.5 G/DL — HIGH (ref 6–8.3)
SODIUM SERPL-SCNC: 129 MMOL/L — LOW (ref 135–145)
SODIUM SERPL-SCNC: 132 MMOL/L — LOW (ref 135–145)
TOTAL CHOLESTEROL/HDL RATIO MEASUREMENT: 4.3 RATIO — SIGNIFICANT CHANGE UP (ref 3.3–7.1)
TRIGL SERPL-MCNC: 134 MG/DL — SIGNIFICANT CHANGE UP (ref 10–149)
TSH SERPL-MCNC: 1.78 UU/ML — SIGNIFICANT CHANGE UP (ref 0.34–4.82)
VIT B12 SERPL-MCNC: 424 PG/ML — SIGNIFICANT CHANGE UP (ref 232–1245)

## 2020-04-14 RX ORDER — SODIUM CHLORIDE 9 MG/ML
1000 INJECTION INTRAMUSCULAR; INTRAVENOUS; SUBCUTANEOUS
Refills: 0 | Status: DISCONTINUED | OUTPATIENT
Start: 2020-04-14 | End: 2020-04-16

## 2020-04-14 RX ORDER — ENOXAPARIN SODIUM 100 MG/ML
80 INJECTION SUBCUTANEOUS
Refills: 0 | Status: DISCONTINUED | OUTPATIENT
Start: 2020-04-15 | End: 2020-04-16

## 2020-04-14 RX ORDER — DILTIAZEM HCL 120 MG
10 CAPSULE, EXT RELEASE 24 HR ORAL ONCE
Refills: 0 | Status: COMPLETED | OUTPATIENT
Start: 2020-04-14 | End: 2020-04-14

## 2020-04-14 RX ORDER — MORPHINE SULFATE 50 MG/1
1 CAPSULE, EXTENDED RELEASE ORAL ONCE
Refills: 0 | Status: DISCONTINUED | OUTPATIENT
Start: 2020-04-14 | End: 2020-04-14

## 2020-04-14 RX ORDER — CARVEDILOL PHOSPHATE 80 MG/1
6.25 CAPSULE, EXTENDED RELEASE ORAL ONCE
Refills: 0 | Status: COMPLETED | OUTPATIENT
Start: 2020-04-14 | End: 2020-04-14

## 2020-04-14 RX ORDER — CARVEDILOL PHOSPHATE 80 MG/1
12.5 CAPSULE, EXTENDED RELEASE ORAL EVERY 12 HOURS
Refills: 0 | Status: DISCONTINUED | OUTPATIENT
Start: 2020-04-15 | End: 2020-04-20

## 2020-04-14 RX ORDER — SIMVASTATIN 20 MG/1
40 TABLET, FILM COATED ORAL AT BEDTIME
Refills: 0 | Status: DISCONTINUED | OUTPATIENT
Start: 2020-04-14 | End: 2020-04-20

## 2020-04-14 RX ORDER — KETOROLAC TROMETHAMINE 30 MG/ML
15 SYRINGE (ML) INJECTION ONCE
Refills: 0 | Status: DISCONTINUED | OUTPATIENT
Start: 2020-04-14 | End: 2020-04-14

## 2020-04-14 RX ORDER — ENOXAPARIN SODIUM 100 MG/ML
80 INJECTION SUBCUTANEOUS
Refills: 0 | Status: DISCONTINUED | OUTPATIENT
Start: 2020-04-14 | End: 2020-04-14

## 2020-04-14 RX ADMIN — SODIUM CHLORIDE 60 MILLILITER(S): 9 INJECTION INTRAMUSCULAR; INTRAVENOUS; SUBCUTANEOUS at 18:08

## 2020-04-14 RX ADMIN — SIMVASTATIN 40 MILLIGRAM(S): 20 TABLET, FILM COATED ORAL at 22:12

## 2020-04-14 RX ADMIN — SODIUM CHLORIDE 80 MILLILITER(S): 9 INJECTION INTRAMUSCULAR; INTRAVENOUS; SUBCUTANEOUS at 10:50

## 2020-04-14 RX ADMIN — MORPHINE SULFATE 1 MILLIGRAM(S): 50 CAPSULE, EXTENDED RELEASE ORAL at 02:32

## 2020-04-14 RX ADMIN — Medication 15 MILLIGRAM(S): at 18:08

## 2020-04-14 RX ADMIN — Medication 40 MILLIGRAM(S): at 06:47

## 2020-04-14 RX ADMIN — ANASTROZOLE 1 MILLIGRAM(S): 1 TABLET ORAL at 18:08

## 2020-04-14 RX ADMIN — TRAMADOL HYDROCHLORIDE 50 MILLIGRAM(S): 50 TABLET ORAL at 03:55

## 2020-04-14 RX ADMIN — Medication 650 MILLIGRAM(S): at 10:52

## 2020-04-14 RX ADMIN — SODIUM CHLORIDE 80 MILLILITER(S): 9 INJECTION INTRAMUSCULAR; INTRAVENOUS; SUBCUTANEOUS at 02:35

## 2020-04-14 RX ADMIN — ENOXAPARIN SODIUM 80 MILLIGRAM(S): 100 INJECTION SUBCUTANEOUS at 18:08

## 2020-04-14 RX ADMIN — CARVEDILOL PHOSPHATE 6.25 MILLIGRAM(S): 80 CAPSULE, EXTENDED RELEASE ORAL at 06:47

## 2020-04-14 RX ADMIN — ENOXAPARIN SODIUM 140 MILLIGRAM(S): 100 INJECTION SUBCUTANEOUS at 06:47

## 2020-04-14 RX ADMIN — SIMVASTATIN 20 MILLIGRAM(S): 20 TABLET, FILM COATED ORAL at 03:19

## 2020-04-14 RX ADMIN — Medication 10 MILLIGRAM(S): at 03:56

## 2020-04-14 RX ADMIN — CARVEDILOL PHOSPHATE 6.25 MILLIGRAM(S): 80 CAPSULE, EXTENDED RELEASE ORAL at 14:59

## 2020-04-14 NOTE — CONSULT NOTE ADULT - ASSESSMENT
87 yo F from home, with pmh of blindness,  HTN, pre diabetes,  breast cancer, arthritis presented by EMS with c/o generalized weakness. Pt states she was walking to bathroom and fell due to weakness. Patient reports falling on her right side, denies hitting head. Also, reports severe left leg swelling and pain for  past 3 days. Denies pain, fever, SOB,other acute complaints.  Spoke to family member over phone,[phone],  who informed me that patient is mostly wheel chair bound, minimally ambulates with walker.   in ER pt with rapid a.fib and +for DVT  increase beta blocker for rate control  ?PE pt with high creatinine and renal insuffiencey npt advisable for IV contrast  decrease Lovenox dose for renal insuffiencey rather heparin iv and coumadin  no need for each in view of COVID pandemic  tsh  lipid panel

## 2020-04-14 NOTE — CONSULT NOTE ADULT - SUBJECTIVE AND OBJECTIVE BOX
CHIEF COMPLAINT:Patient is a 88y old  Female who presents with a chief complaint of fall (13 Apr 2020 19:27)      HPI:  87 yo F from home, with pmh of blindness,  HTN, pre diabetes,  breast cancer, arthritis presented by EMS with c/o generalized weakness. Pt states she was walking to bathroom and fell due to weakness. Patient reports falling on her right side, denies hitting head. Also, reports severe left leg swelling and pain for  past 3 days. Denies pain, fever, SOB,other acute complaints.  Spoke to family member over phone,[phone],  who informed me that patient is mostly wheel chair bound, minimally ambulates with walker.   in ER pt with rapid a.fib and +for DVT    PAST MEDICAL & SURGICAL HISTORY:  Breast cancer: right  Pre-diabetes  Legally blind  Arthritis  History of hypertension  No significant past surgical history      MEDICATIONS  (STANDING):  anastrozole 1 milliGRAM(s) Oral daily  carvedilol 6.25 milliGRAM(s) Oral every 12 hours  enoxaparin Injectable 140 milliGRAM(s) SubCutaneous two times a day  furosemide    Tablet 40 milliGRAM(s) Oral daily  simvastatin 20 milliGRAM(s) Oral at bedtime  sodium chloride 0.9%. 1000 milliLiter(s) (80 mL/Hr) IV Continuous <Continuous>    MEDICATIONS  (PRN):  acetaminophen   Tablet .. 650 milliGRAM(s) Oral every 6 hours PRN Temp greater or equal to 38C (100.4F), Mild Pain (1 - 3)  traMADol 25 milliGRAM(s) Oral every 6 hours PRN Moderate Pain (4 - 6)  traMADol 50 milliGRAM(s) Oral every 8 hours PRN Severe Pain (7 - 10)      FAMILY HISTORY:  No pertinent family history in first degree relatives      SOCIAL HISTORY:    [ ] Non-smoker  [ ] Smoker  [ ] Alcohol    Allergies    No Known Allergies    Intolerances    	    REVIEW OF SYSTEMS:  CONSTITUTIONAL: No fever, weight loss, or fatigue  EYES: No eye pain, visual disturbances, or discharge  ENT:  No difficulty hearing, tinnitus, vertigo; No sinus or throat pain  NECK: No pain or stiffness  RESPIRATORY: No cough, wheezing, chills or hemoptysis; + Shortness of Breath  CARDIOVASCULAR: No chest pain, palpitations, passing out, dizziness, or leg swelling  GASTROINTESTINAL: No abdominal or epigastric pain. No nausea, vomiting, or hematemesis; No diarrhea or constipation. No melena or hematochezia.  GENITOURINARY: No dysuria, frequency, hematuria, or incontinence  NEUROLOGICAL: No headaches, memory loss, loss of strength, numbness, or tremors  SKIN: No itching, burning, rashes, or lesions   LYMPH Nodes: No enlarged glands  ENDOCRINE: No heat or cold intolerance; No hair loss  MUSCULOSKELETAL: No joint pain or swelling; No muscle, back, or extremity pain  PSYCHIATRIC: No depression, anxiety, mood swings, or difficulty sleeping  HEME/LYMPH: No easy bruising, or bleeding gums  ALLERGY AND IMMUNOLOGIC: No hives or eczema	    [ ] All others negative	  [ ] Unable to obtain    PHYSICAL EXAM:  T(C): 36.6 (04-14-20 @ 12:45), Max: 36.8 (04-13-20 @ 15:23)  HR: 98 (04-14-20 @ 12:45) (72 - 126)  BP: 137/80 (04-14-20 @ 12:45) (110/82 - 137/80)  RR: 18 (04-14-20 @ 12:45) (18 - 23)  SpO2: 98% (04-14-20 @ 12:45) (96% - 99%)  Wt(kg): --  I&O's Summary      Appearance: Normal	  HEENT:   Normal oral mucosa, PERRL, EOMI	  Lymphatic: No lymphadenopathy  Cardiovascular: Normal S1 S2, No JVD, + murmurs,+ lle  edema  Respiratory:rhonchi  Psychiatry: A & O x 3, Mood & affect appropriate  Gastrointestinal:  Soft, Non-tender, + BS	  Skin: No rashes, No ecchymoses, No cyanosis	  Neurologic: Non-focal  Extremities: Normal range of motion, No clubbing, cyanosis or edema  Vascular: Peripheral pulses palpable 2+ bilaterally    TELEMETRY: 	    ECG:  	  RADIOLOGY:  OTHER: 	  	  LABS:	 	    CARDIAC MARKERS:  CARDIAC MARKERS ( 13 Apr 2020 13:55 )  0.035 ng/mL / x     / 141 U/L / x     / x                                  13.9   13.29 )-----------( 345      ( 13 Apr 2020 13:55 )             43.2     04-14    129<L>  |  97  |  33<H>  ----------------------------<  158<H>  5.3   |  21<L>  |  1.52<H>    Ca    8.6      14 Apr 2020 06:45  Phos  3.7     04-14  Mg     2.3     04-14    TPro  8.5<H>  /  Alb  3.0<L>  /  TBili  1.1  /  DBili  x   /  AST  38  /  ALT  31  /  AlkPhos  124<H>  04-14    proBNP:   Lipid Profile: Cholesterol 245    HDL 57      HgA1c: Hemoglobin A1C, Whole Blood: 6.4 % (04-14 @ 09:00)    TSH: Thyroid Stimulating Hormone, Serum: 1.78 uU/mL (04-14 @ 06:45)    PT/INR - ( 13 Apr 2020 13:56 )   PT: 13.4 sec;   INR: 1.18 ratio         PTT - ( 13 Apr 2020 13:56 )  PTT:34.4 sec    PREVIOUS DIAGNOSTIC TESTING:    < from: 12 Lead ECG (04.14.20 @ 03:43) >  Diagnosis Line Atrial fibrillation with rapid ventricular response with premature ventricular or aberrantly conducted complexes  Minimal voltage criteria for LVH, may be normal variant  Abnormal ECG    < from: 12 Lead ECG (04.13.20 @ 13:20) >  Ventricular Rate 144 BPM    Atrial Rate 288 BPM    QRS Duration 90 ms    Q-T Interval 310 ms    QTC Calculation(Bezet) 479 ms    P Axis 267 degrees    R Axis 16 degrees    T Axis -68 degrees    Diagnosis Line Atrial flutter with 2:1 A-V conduction  Left ventricular hypertrophy with repolarization abnormality  Abnormal ECG    < from: CT Head No Cont (04.13.20 @ 15:56) >  Evaluation demonstrates no evidence of mass-effect or midline shift. No intraparenchymal mass lesions or hemorrhage is identified. 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 There is no evidence of hydrocephalus. No extra-axial collections are noted.    The bone windows demonstrate no gross osseous abnormalities.    Impression:  1. Unremarkable noncontrast CT scan of the brain.    < from: Transthoracic Echocardiogram (10.22.17 @ 07:56) >  Mitral Valve: Normal mitral valve. Mild mitral  regurgitation.  Aortic Root: Aortic Root: 3.8 cm.  Aortic Valve: Normal trileaflet aortic valve. Mild aortic  regurgitation.  Left Atrium: Normal left atrium.  Left Ventricle: Normal Left Ventricular Systolic Function,  (EF = 55 to 60%) Normal left ventricular internal  dimensions and wall thicknesses. Grade II diastolic  dysfunction.  Right Heart: Normal right atrium. Normal right ventricular  size and function. There is mild tricuspid regurgitation.  Pericardium/PleuraNormal pericardium with no pericardial  effusion.  ------------------------------------------------------------------------  CONCLUSIONS:  1. Normal mitral valve. Mild mitral regurgitation.  2. Normal trileaflet aortic valve. Mild aortic  regurgitation.  3. Aortic Root: 3.8 cm.  4. Normal left atrium.  5. Normal left ventricular internal dimensions and wall  thicknesses.  6. Normal Left Ventricular Systolic Function,  (EF = 55 to  60%)  7. Grade II diastolic dysfunction.  8. Normal right atrium.  9. Normal right ventricular size and function.  10. There is mild tricuspid regurgitation.  11. Normal pericardium with no pericardial effusion.    < from: Xray Chest 1 View- PORTABLE-Urgent (04.13.20 @ 14:23) >  Heart and mediastinum appear stable from priors. No acute lung infiltrates or obvious effusions. No pneumothorax    Impression: No acute findings.    < from: CT Chest w/ IV Cont (05.25.19 @ 01:50) >  1. Partially visualized lobulated right breast mass concerning for a   breast neoplasm. Further imaging with mammography, breast ultrasound   and/or breast MRI at a dedicated breast imaging center is recommended for   further evaluation.  2. Right axillary lymphadenopathy.  3. Indeterminate right renal midpole low-attenuation lesion. A   nonemergent renal ultrasound is recommended for further evaluation.    < end of copied text >

## 2020-04-14 NOTE — PROGRESS NOTE ADULT - PROBLEM SELECTOR PLAN 1
Now in Afib  Increased Carvedilol to 12.5 this PM and to continue for rate control  TSH wnl x 2   C/w Telemetry  YAHAIRA-Leonard consulted Now in Afib  Increased Carvedilol to 12.5 this PM and to continue BID for rate control  TSH wnl x 2   C/w Telemetry  YAHAIRA-Leonard consulted

## 2020-04-14 NOTE — ED ADULT NURSE REASSESSMENT NOTE - NS ED NURSE REASSESS COMMENT FT1
Received pt from [facility], pt observed laying in bed, breathing room air, in no respiratory distress at time of assessment, c/o of pain to left leg, observed swollen and red. Admitted to [facility], on [address],  at this time. No meds to be administered at this time. Nursing monitoring continues.

## 2020-04-14 NOTE — PROGRESS NOTE ADULT - SUBJECTIVE AND OBJECTIVE BOX
NP Note discussed with  primary attending    Patient is a 88y old  Female who presents with a chief complaint of fall (14 Apr 2020 14:54)    Pt seen in ED no complaints at this time.      INTERVAL HPI/OVERNIGHT EVENTS: no new complaints    MEDICATIONS  (STANDING):  anastrozole 1 milliGRAM(s) Oral daily  enoxaparin Injectable 80 milliGRAM(s) SubCutaneous two times a day  ketorolac   Injectable 15 milliGRAM(s) IV Push once  simvastatin 40 milliGRAM(s) Oral at bedtime  sodium chloride 0.9%. 1000 milliLiter(s) (60 mL/Hr) IV Continuous <Continuous>    MEDICATIONS  (PRN):  acetaminophen   Tablet .. 650 milliGRAM(s) Oral every 6 hours PRN Temp greater or equal to 38C (100.4F), Mild Pain (1 - 3)  traMADol 25 milliGRAM(s) Oral every 6 hours PRN Moderate Pain (4 - 6)  traMADol 50 milliGRAM(s) Oral every 8 hours PRN Severe Pain (7 - 10)      __________________________________________________  REVIEW OF SYSTEMS:    CONSTITUTIONAL: No fever  EYES: No acute visual disturbances  NECK: No pain or stiffness  RESPIRATORY: No cough; No shortness of breath  CARDIOVASCULAR: No chest pain, no palpitations  GASTROINTESTINAL: No pain. No nausea or vomiting.  No diarrhea   NEUROLOGICAL: No headache or numbness, no tremors  MUSCULOSKELETAL: (+) Right joint pain, (+) Right muscle pain  GENITOURINARY: No dysuria, no frequency, no hesitancy  PSYCHIATRY: No depression , no anxiety  ALL OTHER  ROS negative        Vital Signs Last 24 Hrs  T(C): 36.8 (14 Apr 2020 16:21), Max: 36.8 (14 Apr 2020 16:21)  T(F): 98.3 (14 Apr 2020 16:21), Max: 98.3 (14 Apr 2020 16:21)  HR: 100 (14 Apr 2020 16:21) (79 - 126)  BP: 93/70 (14 Apr 2020 16:21) (93/70 - 137/80)  BP(mean): 107 (14 Apr 2020 02:15) (102 - 107)  RR: 18 (14 Apr 2020 16:21) (18 - 23)  SpO2: 93% (14 Apr 2020 16:21) (93% - 99%)    ________________________________________________  PHYSICAL EXAM:  GENERAL: NAD  HEENT: Normocephalic;  conjunctivae and sclerae clear; moist mucous membranes;   NECK : Supple  CHEST/LUNG: Clear to auscultation bilaterally with good air entry   HEART: S1 S2  regular; no murmurs, gallops or rubs  ABDOMEN: Soft, Nontender, Nondistended; Bowel sounds present x 4 quad  EXTREMITIES: No cyanosis; no edema; RLE>LLE no painful to touch  SKIN: Warm and dry; no rash  NERVOUS SYSTEM:  Awake and alert; Oriented  to place, person and time ; no new deficits    _________________________________________________  LABS:                        13.9   13.29 )-----------( 345      ( 13 Apr 2020 13:55 )             43.2     04-14    129<L>  |  97  |  33<H>  ----------------------------<  158<H>  5.3   |  21<L>  |  1.52<H>    Ca    8.6      14 Apr 2020 06:45  Phos  3.7     04-14  Mg     2.3     04-14    TPro  8.5<H>  /  Alb  3.0<L>  /  TBili  1.1  /  DBili  x   /  AST  38  /  ALT  31  /  AlkPhos  124<H>  04-14    PT/INR - ( 13 Apr 2020 13:56 )   PT: 13.4 sec;   INR: 1.18 ratio         PTT - ( 13 Apr 2020 13:56 )  PTT:34.4 sec    CAPILLARY BLOOD GLUCOSE            RADIOLOGY & ADDITIONAL TESTS:  < from: Xray Chest 1 View- PORTABLE-Urgent (04.13.20 @ 14:23) >    EXAM:  XR CHEST PORTABLE URGENT 1V                            PROCEDURE DATE:  04/13/2020          INTERPRETATION:  Clinical history: Fall    Heart and mediastinum appear stable from priors. No acute lung infiltrates or obvious effusions. No pneumothorax    Impression: No acute findings.                SHY GALLARDO   This document has been electronically signed. Apr 13 2020  2:25PM                < end of copied text >  < from: Xray Pelvis AP only (04.13.20 @ 17:17) >  EXAM:  PELVIS AP ONLY                            PROCEDURE DATE:  04/13/2020          INTERPRETATION:  CLINICAL HISTORY: 88 years old Female with fall.    COMPARISON: Pelvis x-ray 10/16/2014 and CT abdomen and pelvis 9/13/2019    Evaluation is markedly limited by patient positioning and overlying soft tissue.    There are severe degenerative changes of the right hip with right acetabular deformity, acetabular osteophytes and femoral head osteophyte. There is mild protrusio acetabuli, increased from the prior study. There are moderate degenerative changes of the left hip with acetabular and femoral head osteophytes.    Curvilinear calcifications in the right pelvis are related to a partially calcified right ovarian dermoid seen on CT.    There is no obvious fracture, subluxation or dislocation. There is no lytic or blastic lesion. There is no periosteal reaction or cortical destruction to suggest osteomyelitis.    There are degenerative changes of the sacroiliac joints with osteophyte formation. The pubic symphysis is normal.    There is degenerative disc disease in the lower lumbosacral spine..    The osseous mineralization is normal.    There is no soft tissue abnormality.    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 Moderate left hip osteoarthritis.    No obvious fracture.    Limited study. Pain persists, follow-up CT or MRI could be performed.                LOGAN VAZQUEZ M.D., ATTENDING RADIOLOGIST  This document has been electronically signed. Apr 13 2020  5:59PM                < end of copied text >    Care Discussed with Consultants :     Plan of care was discussed with patient and /or primary care giver; all questions and concerns were addressed and care was aligned with patient's wishes.

## 2020-04-14 NOTE — CHART NOTE - NSCHARTNOTEFT_GEN_A_CORE
MEDICINE NP    Notified by RN patient with heart 150's. Seen and examined patient at bedside. Patient is alert, NAD. Denies HA, CP, SOB, cough, N/V, or abd pain. Ordered EKG for patient which resulted with Afib patient was previously Aflutter. Patient currently on rate control and on AC. Cardizem 10mg IVP ordered. monitoring for worsening condition.     VITAL SIGNS:  T(C): 36.5 (04-14-20 @ 00:45), Max: 36.8 (04-13-20 @ 15:23)  HR: 105 (04-14-20 @ 02:15) (72 - 145)  BP: 128/102 (04-14-20 @ 02:15) (114/90 - 139/94)  RR: 21 (04-14-20 @ 00:45) (18 - 25)  SpO2: 98% (04-14-20 @ 00:45) (96% - 98%)  Wt(kg): --

## 2020-04-14 NOTE — CHART NOTE - NSCHARTNOTEFT_GEN_A_CORE
MEDICINE NP    Notified by RN patient with leg pain. Patient positive for left lower extremity deep venous thrombosis. patient with swollen and painful left lower extremity. patient given 1mg morphine for pain. Monitoring for worsening condition.    VITAL SIGNS:  T(C): 36.5 (04-14-20 @ 00:45), Max: 36.8 (04-13-20 @ 15:23)  HR: 105 (04-14-20 @ 02:15) (72 - 145)  BP: 128/102 (04-14-20 @ 02:15) (114/90 - 139/94)  RR: 21 (04-14-20 @ 00:45) (18 - 25)  SpO2: 98% (04-14-20 @ 00:45) (96% - 98%)  Wt(kg): --    Glenn GLOVERC

## 2020-04-15 ENCOUNTER — TRANSCRIPTION ENCOUNTER (OUTPATIENT)
Age: 85
End: 2020-04-15

## 2020-04-15 LAB
ANION GAP SERPL CALC-SCNC: 6 MMOL/L — SIGNIFICANT CHANGE UP (ref 5–17)
BUN SERPL-MCNC: 34 MG/DL — HIGH (ref 7–18)
CALCIUM SERPL-MCNC: 8.8 MG/DL — SIGNIFICANT CHANGE UP (ref 8.4–10.5)
CHLORIDE SERPL-SCNC: 100 MMOL/L — SIGNIFICANT CHANGE UP (ref 96–108)
CO2 SERPL-SCNC: 27 MMOL/L — SIGNIFICANT CHANGE UP (ref 22–31)
CREAT SERPL-MCNC: 1.1 MG/DL — SIGNIFICANT CHANGE UP (ref 0.5–1.3)
GLUCOSE SERPL-MCNC: 140 MG/DL — HIGH (ref 70–99)
HCT VFR BLD CALC: 37.4 % — SIGNIFICANT CHANGE UP (ref 34.5–45)
HGB BLD-MCNC: 12 G/DL — SIGNIFICANT CHANGE UP (ref 11.5–15.5)
MCHC RBC-ENTMCNC: 29.9 PG — SIGNIFICANT CHANGE UP (ref 27–34)
MCHC RBC-ENTMCNC: 32.1 GM/DL — SIGNIFICANT CHANGE UP (ref 32–36)
MCV RBC AUTO: 93 FL — SIGNIFICANT CHANGE UP (ref 80–100)
NRBC # BLD: 0 /100 WBCS — SIGNIFICANT CHANGE UP (ref 0–0)
PLATELET # BLD AUTO: 321 K/UL — SIGNIFICANT CHANGE UP (ref 150–400)
POTASSIUM SERPL-MCNC: 5.2 MMOL/L — SIGNIFICANT CHANGE UP (ref 3.5–5.3)
POTASSIUM SERPL-SCNC: 5.2 MMOL/L — SIGNIFICANT CHANGE UP (ref 3.5–5.3)
RBC # BLD: 4.02 M/UL — SIGNIFICANT CHANGE UP (ref 3.8–5.2)
RBC # FLD: 13 % — SIGNIFICANT CHANGE UP (ref 10.3–14.5)
SODIUM SERPL-SCNC: 133 MMOL/L — LOW (ref 135–145)
WBC # BLD: 9.45 K/UL — SIGNIFICANT CHANGE UP (ref 3.8–10.5)
WBC # FLD AUTO: 9.45 K/UL — SIGNIFICANT CHANGE UP (ref 3.8–10.5)

## 2020-04-15 RX ADMIN — CARVEDILOL PHOSPHATE 12.5 MILLIGRAM(S): 80 CAPSULE, EXTENDED RELEASE ORAL at 17:14

## 2020-04-15 RX ADMIN — ENOXAPARIN SODIUM 80 MILLIGRAM(S): 100 INJECTION SUBCUTANEOUS at 05:46

## 2020-04-15 RX ADMIN — ANASTROZOLE 1 MILLIGRAM(S): 1 TABLET ORAL at 11:58

## 2020-04-15 RX ADMIN — CARVEDILOL PHOSPHATE 12.5 MILLIGRAM(S): 80 CAPSULE, EXTENDED RELEASE ORAL at 06:19

## 2020-04-15 RX ADMIN — ENOXAPARIN SODIUM 80 MILLIGRAM(S): 100 INJECTION SUBCUTANEOUS at 17:14

## 2020-04-15 RX ADMIN — SIMVASTATIN 40 MILLIGRAM(S): 20 TABLET, FILM COATED ORAL at 21:32

## 2020-04-15 RX ADMIN — TRAMADOL HYDROCHLORIDE 50 MILLIGRAM(S): 50 TABLET ORAL at 15:16

## 2020-04-15 NOTE — PROGRESS NOTE ADULT - SUBJECTIVE AND OBJECTIVE BOX
INTERVAL HPI/OVERNIGHT EVENTS:    No acute events overnight.   Pt resting comfortably. No acute complaints.   C/o R knee pain, OA, LLE pain and increased swelling.   R foot ulcer, likely venous, taken care of at the [facility] wound care center.     MEDICATIONS  (STANDING):  anastrozole 1 milliGRAM(s) Oral daily  carvedilol 12.5 milliGRAM(s) Oral every 12 hours  enoxaparin Injectable 80 milliGRAM(s) SubCutaneous two times a day  simvastatin 40 milliGRAM(s) Oral at bedtime  sodium chloride 0.9%. 1000 milliLiter(s) (60 mL/Hr) IV Continuous <Continuous>    MEDICATIONS  (PRN):  acetaminophen   Tablet .. 650 milliGRAM(s) Oral every 6 hours PRN Temp greater or equal to 38C (100.4F), Mild Pain (1 - 3)  traMADol 25 milliGRAM(s) Oral every 6 hours PRN Moderate Pain (4 - 6)  traMADol 50 milliGRAM(s) Oral every 8 hours PRN Severe Pain (7 - 10)      Vital Signs Last 24 Hrs  T(C): 37.1 (15 Apr 2020 16:06), Max: 37.1 (15 Apr 2020 04:42)  T(F): 98.7 (15 Apr 2020 16:06), Max: 98.8 (15 Apr 2020 04:42)  HR: 62 (15 Apr 2020 16:06) (62 - 100)  BP: 134/78 (15 Apr 2020 16:06) (107/73 - 134/78)  BP(mean): --  RR: 19 (15 Apr 2020 16:06) (16 - 20)  SpO2: 95% (15 Apr 2020 16:06) (95% - 98%)      PHYSICAL EXAM  General: Alert and oriented, not in acute distress  Resp: Breathing unlabored  Abdomen: Soft, nondistended, nontender  : No long catheter, no dysuria or hematuria  Extremities: LLE edema, pitting, tender to palp; RLE non edematous, R foot venous ulcer, no eschar or evidence of necrosis or infection    I&O's Detail    15 Apr 2020 07:01  -  15 Apr 2020 17:54  --------------------------------------------------------  IN:    Oral Fluid: 430 mL  Total IN: 430 mL    OUT:  Total OUT: 0 mL    Total NET: 430 mL          LABS:                        12.0   9.45  )-----------( 321      ( 15 Apr 2020 06:19 )             37.4             04-15    133<L>  |  100  |  34<H>  ----------------------------<  140<H>  5.2   |  27  |  1.10    Ca    8.8      15 Apr 2020 06:19  Phos  3.7     04-14  Mg     2.3     04-14    TPro  7.9  /  Alb  3.0<L>  /  TBili  0.7  /  DBili  x   /  AST  26  /  ALT  27  /  AlkPhos  121<H>  04-14      < from: US Duplex Venous Lower Ext Ltd, Left (04.13.20 @ 14:45) >    FINDINGS:    There is deep venous thrombosis of the left common femoral, femoral and popliteal veins.     The calf veins were not visualized due to edema.    IMPRESSION:     Positivefor left lower extremity deep venous thrombosis.    < end of copied text >

## 2020-04-15 NOTE — DISCHARGE NOTE PROVIDER - NSDCMRMEDTOKEN_GEN_ALL_CORE_FT
anastrozole 1 mg oral tablet: 1 tab(s) orally once a day  Coreg 6.25 mg oral tablet: 1 tab(s) orally 2 times a day  furosemide 40 mg oral tablet: 1 tab(s) orally once a day  Zocor 20 mg oral tablet: 1 tab(s) orally once a day (at bedtime) anastrozole 1 mg oral tablet: 1 tab(s) orally once a day  apixaban 5 mg oral tablet: 2 tab(s) orally 2 times a day  for 7 days; then 1 tab orally 2 times a day   Coreg 6.25 mg oral tablet: 1 tab(s) orally 2 times a day  furosemide 40 mg oral tablet: 1 tab(s) orally once a day  Zocor 20 mg oral tablet: 1 tab(s) orally once a day (at bedtime)

## 2020-04-15 NOTE — DISCHARGE NOTE PROVIDER - CARE PROVIDER_API CALL
Ayden Bianchi)  [address], [place] [address]  Phone: [phone]  Fax: [phone]  Follow Up Time: 2 weeks

## 2020-04-15 NOTE — PROGRESS NOTE ADULT - ASSESSMENT
88 yoF h/o blindness, HTN, R breast CA, RA and chronic BLE edema; now with LLE DVT     - pt on therapeutic lovenox, continue  - A fib, on carvedilol rate controlled, Dr. Valle [facility]  - [facility] RN for R foot ulcer  - previous DEAN/PVR in July/2019, wnl, no evidence of vascular compromise   - PT eval for amb status and dispo rec  - d/w Dr. Bianchi

## 2020-04-15 NOTE — DISCHARGE NOTE PROVIDER - NSDCCPCAREPLAN_GEN_ALL_CORE_FT
PRINCIPAL DISCHARGE DIAGNOSIS  Diagnosis: DVT (deep venous thrombosis)  Assessment and Plan of Treatment: Take your "blood thinners" as prescribed.  Walking is encouraged, increase activity as tolerated.  If you develop new leg pain, swelling, and/or redness contact your healthcare provider.  If you develop new chest pain with difficulty breathing, a rapid heart rate and/or a feeling of passing out call emergency medical services 911.

## 2020-04-15 NOTE — DISCHARGE NOTE PROVIDER - HOSPITAL COURSE
87 yo F from home, with pmh of blindness,  HTN, pre diabetes,  breast cancer, arthritis presented by EMS with c/o generalized weakness. Pt states she was walking to bathroom and fell due to weakness. Patient reports falling on her right side, denies hitting head. Also, reports severe left leg swelling and pain for past 3 days.        US Duplex Venous Lower Ext IMPRESSION:         Positive for left lower extremity deep venous thrombosis.        Lovenox dose decreased by cardiology secondary to kidney function.                INCOMPLETE 89 yo F from home, with pmh of blindness,  HTN, pre diabetes,  breast cancer, arthritis presented by EMS with c/o generalized weakness. Pt states she was walking to bathroom and fell due to weakness. Patient reports falling on her right side, denies hitting head. Also, reports severe left leg swelling and pain for past 3 days.        Doppler positive for left lower extremity deep venous thrombosis.    Transitioned from lovenox to eliquis        Pt complained of knee pain and inability to walk. CXR ruled out fracture. 89 yo F from home, with pmh of blindness,  HTN, pre diabetes,  breast cancer, arthritis presented by EMS with c/o generalized weakness. Pt states she was walking to bathroom and fell due to weakness. Patient reports falling on her right side, denies hitting head. Also, reports severe left leg swelling and pain for past 3 days.        Doppler positive for left lower extremity deep venous thrombosis.    Transitioned from lovenox to eliquis        Pt complained of knee pain and inability to walk. CXR ruled out fracture. Wheelchair bound at baseline. No need for rehab as patient is at baseline.    For full summary refer to patients chart.

## 2020-04-15 NOTE — ACUTE INTERFACILITY TRANSFER NOTE - HOSPITAL COURSE
87 yo F from home, with pmh of blindness,  HTN, pre diabetes,  breast cancer, arthritis presented by EMS with c/o generalized weakness. Pt states she was walking to bathroom and fell due to weakness. Patient reports falling on her right side, denies hitting head. Also, reports severe left leg swelling and pain for past 3 days.    US Duplex Venous Lower Ext IMPRESSION:     Positive for left lower extremity deep venous thrombosis.    Lovenox dose decreased by cardiology secondary to kidney function.

## 2020-04-16 DIAGNOSIS — Z71.89 OTHER SPECIFIED COUNSELING: ICD-10-CM

## 2020-04-16 LAB
ALBUMIN SERPL ELPH-MCNC: 2.7 G/DL — LOW (ref 3.5–5)
ALP SERPL-CCNC: 107 U/L — SIGNIFICANT CHANGE UP (ref 40–120)
ALT FLD-CCNC: 33 U/L DA — SIGNIFICANT CHANGE UP (ref 10–60)
ANION GAP SERPL CALC-SCNC: 5 MMOL/L — SIGNIFICANT CHANGE UP (ref 5–17)
AST SERPL-CCNC: 31 U/L — SIGNIFICANT CHANGE UP (ref 10–40)
BILIRUB SERPL-MCNC: 0.8 MG/DL — SIGNIFICANT CHANGE UP (ref 0.2–1.2)
BUN SERPL-MCNC: 30 MG/DL — HIGH (ref 7–18)
CALCIUM SERPL-MCNC: 8.9 MG/DL — SIGNIFICANT CHANGE UP (ref 8.4–10.5)
CHLORIDE SERPL-SCNC: 100 MMOL/L — SIGNIFICANT CHANGE UP (ref 96–108)
CO2 SERPL-SCNC: 28 MMOL/L — SIGNIFICANT CHANGE UP (ref 22–31)
CREAT SERPL-MCNC: 0.92 MG/DL — SIGNIFICANT CHANGE UP (ref 0.5–1.3)
GLUCOSE SERPL-MCNC: 134 MG/DL — HIGH (ref 70–99)
HCT VFR BLD CALC: 36.7 % — SIGNIFICANT CHANGE UP (ref 34.5–45)
HGB BLD-MCNC: 12 G/DL — SIGNIFICANT CHANGE UP (ref 11.5–15.5)
MCHC RBC-ENTMCNC: 30.6 PG — SIGNIFICANT CHANGE UP (ref 27–34)
MCHC RBC-ENTMCNC: 32.7 GM/DL — SIGNIFICANT CHANGE UP (ref 32–36)
MCV RBC AUTO: 93.6 FL — SIGNIFICANT CHANGE UP (ref 80–100)
NRBC # BLD: 0 /100 WBCS — SIGNIFICANT CHANGE UP (ref 0–0)
PLATELET # BLD AUTO: 311 K/UL — SIGNIFICANT CHANGE UP (ref 150–400)
POTASSIUM SERPL-MCNC: 4.6 MMOL/L — SIGNIFICANT CHANGE UP (ref 3.5–5.3)
POTASSIUM SERPL-SCNC: 4.6 MMOL/L — SIGNIFICANT CHANGE UP (ref 3.5–5.3)
PROT SERPL-MCNC: 7.4 G/DL — SIGNIFICANT CHANGE UP (ref 6–8.3)
RBC # BLD: 3.92 M/UL — SIGNIFICANT CHANGE UP (ref 3.8–5.2)
RBC # FLD: 13 % — SIGNIFICANT CHANGE UP (ref 10.3–14.5)
SODIUM SERPL-SCNC: 133 MMOL/L — LOW (ref 135–145)
WBC # BLD: 8.63 K/UL — SIGNIFICANT CHANGE UP (ref 3.8–10.5)
WBC # FLD AUTO: 8.63 K/UL — SIGNIFICANT CHANGE UP (ref 3.8–10.5)

## 2020-04-16 RX ORDER — FUROSEMIDE 40 MG
40 TABLET ORAL DAILY
Refills: 0 | Status: DISCONTINUED | OUTPATIENT
Start: 2020-04-16 | End: 2020-04-16

## 2020-04-16 RX ORDER — APIXABAN 2.5 MG/1
10 TABLET, FILM COATED ORAL
Refills: 0 | Status: DISCONTINUED | OUTPATIENT
Start: 2020-04-16 | End: 2020-04-20

## 2020-04-16 RX ADMIN — ANASTROZOLE 1 MILLIGRAM(S): 1 TABLET ORAL at 12:29

## 2020-04-16 RX ADMIN — ENOXAPARIN SODIUM 80 MILLIGRAM(S): 100 INJECTION SUBCUTANEOUS at 06:00

## 2020-04-16 RX ADMIN — SIMVASTATIN 40 MILLIGRAM(S): 20 TABLET, FILM COATED ORAL at 22:53

## 2020-04-16 RX ADMIN — TRAMADOL HYDROCHLORIDE 50 MILLIGRAM(S): 50 TABLET ORAL at 06:00

## 2020-04-16 RX ADMIN — CARVEDILOL PHOSPHATE 12.5 MILLIGRAM(S): 80 CAPSULE, EXTENDED RELEASE ORAL at 05:59

## 2020-04-16 RX ADMIN — Medication 40 MILLIGRAM(S): at 12:29

## 2020-04-16 RX ADMIN — CARVEDILOL PHOSPHATE 12.5 MILLIGRAM(S): 80 CAPSULE, EXTENDED RELEASE ORAL at 18:17

## 2020-04-16 RX ADMIN — APIXABAN 10 MILLIGRAM(S): 2.5 TABLET, FILM COATED ORAL at 18:17

## 2020-04-16 RX ADMIN — TRAMADOL HYDROCHLORIDE 50 MILLIGRAM(S): 50 TABLET ORAL at 18:17

## 2020-04-16 NOTE — PHYSICAL THERAPY INITIAL EVALUATION ADULT - PERTINENT HX OF CURRENT PROBLEM, REHAB EVAL
Patient admitted from home due to a fall due to weakness. Patient w/ h/o Arthritis, Obesity, blindness

## 2020-04-16 NOTE — ADVANCED PRACTICE NURSE CONSULT - ASSESSMENT
This is a 88yr old female patient admitted for A. Flutter, presenting with a R. Medial Ankle Venous Stasis Ulcer (2cm x 1cm x 0.3cm) with dried slough (90%), red tissue, no drainage, and periwound induration

## 2020-04-16 NOTE — PROGRESS NOTE ADULT - ASSESSMENT
88 yoF h/o blindness, HTN, R breast CA, RA and chronic BLE edema; now with LLE DVT     - pt on therapeutic lovenox, continue  - A fib, on carvedilol rate controlled, Dr. Valle following; rec'ed if HR uncontrolled consider digoxin and uptitrating BB  - [facility] RN for R foot ulcer; rec'ed medihoney ointment with foam dressing  - R knee pain, ortho consulted, f/u XR and NWB for now  - previous DEAN/PVR in July/2019, wnl, no evidence of vascular compromise   - PT eval for amb status once cleared per XR/ortho and dispo rec

## 2020-04-16 NOTE — CONSULT NOTE ADULT - ATTENDING COMMENTS
pt seen and evaluated, agree with above, bilat knee pain, djd, r/o fracture, rec xrays, bedrest, pain control, dvt prophylasix.  will follow

## 2020-04-16 NOTE — CONSULT NOTE ADULT - SUBJECTIVE AND OBJECTIVE BOX
Pt Name: NUNO LAST  MRN: [id number]    ORTHOPEDIC CONSULT    Orthopedic diagnosis: bilateral knee pain    88yFemaleHPI:  89 yo F from home, with pmh of blindness,  HTN, pre diabetes,  breast cancer, arthritis presented by EMS with c/o generalized weakness. Pt states she was walking to bathroom and fell due to weakness. Patient reports falling on her right side, denies hitting head. Also, reports severe left leg swelling and pain for  past 3 days. Denies pain, fever, SOB, syncope, other acute complaints.  Spoke to family member over phone,[phone],  who informed me that patient is mostly wheel chair bound, minimally ambulates with walker.     GOC:  (13 Apr 2020 19:27)    Ortho called to evaluate patient with b/l knee pain, rated 8/10 b/l. denies right hip pain  s/p fall at home. On full dose lovenox for LLE DVT. +Blind. wheelchair bound to move around in long distances. minimal ambulator baseline.  pt with mild pain at rest. Pt denies Chest pain, SOB, dyspnea, paresthesias, N/V/D, abdominal pain, syncope, or pain anywhere else.     AMBULATION: Baseline Ambulation [ ] independent [ ] With Cane [ ] With Walker [ ]  Bedbound [ [id number] ] Pivot transfers to Wheelchair only    PAST MEDICAL & SURGICAL HISTORY:  Breast cancer: right  Pre-diabetes  Legally blind  Arthritis  History of hypertension  No significant past surgical history      ALLERGIES: No Known Allergies      MEDICATIONS: acetaminophen   Tablet .. 650 milliGRAM(s) Oral every 6 hours PRN  anastrozole 1 milliGRAM(s) Oral daily  carvedilol 12.5 milliGRAM(s) Oral every 12 hours  enoxaparin Injectable 80 milliGRAM(s) SubCutaneous two times a day  furosemide    Tablet 40 milliGRAM(s) Oral daily  simvastatin 40 milliGRAM(s) Oral at bedtime  sodium chloride 0.9%. 1000 milliLiter(s) IV Continuous <Continuous>  traMADol 25 milliGRAM(s) Oral every 6 hours PRN  traMADol 50 milliGRAM(s) Oral every 8 hours PRN      PHYSICAL EXAM:    Vital Signs Last 24 Hrs  T(C): 37.1 (16 Apr 2020 11:38), Max: 37.2 (15 Apr 2020 23:40)  T(F): 98.7 (16 Apr 2020 11:38), Max: 98.9 (15 Apr 2020 23:40)  HR: 83 (16 Apr 2020 11:38) (62 - 101)  BP: 122/60 (16 Apr 2020 11:38) (120/70 - 153/77)  BP(mean): --  RR: 18 (16 Apr 2020 11:38) (18 - 19)  SpO2: 99% (16 Apr 2020 11:38) (95% - 99%)    Gen: well developed, well nourished, comfortable  MSK:  Skin pink, warm. No open lesions.  no ct  calves soft  DP/PT 2+, brisk b/l  nvi silt  5/5 strength ehl/ta/gastroc b/l.    LABS:                        12.0   8.63  )-----------( 311      ( 16 Apr 2020 06:51 )             36.7     04-16    133<L>  |  100  |  30<H>  ----------------------------<  134<H>  4.6   |  28  |  0.92    Ca    8.9      16 Apr 2020 06:51    TPro  7.4  /  Alb  2.7<L>  /  TBili  0.8  /  DBili  x   /  AST  31  /  ALT  33  /  AlkPhos  107  04-16        RADIOLOGY:     IMPRESSION: Pt is a  88y Female with:  - Right hip DJD (severe)  - Left knee OA  - r/o right knee fracture    PLAN:  -  Recommendation: [  ] Conservative treatment   [  ] Surgical treatment/fixation     > Recommend xrays right knee to r/o fracture. s/p fall, low energy mechanism.      > On full dose lovenox, not a candidate for knee needle aspiration-> very high likelihood for hemarthrosis, persistent  -  Pain control  -  DVT tx- on lovenox full dose  -  Daily PT- hold off on weight bearing for now until repeat xrays return  -  Case d/w

## 2020-04-16 NOTE — ADVANCED PRACTICE NURSE CONSULT - RECOMMEDATIONS
-Clean the R. Medial Ankle wound with normal saline and apply skin prep to the surrounding skin  -Apply MediHoney ointment to the wound bed and cover with a Foam dressing Q 48hrs PRN  -Elevate/float the patients heels using heel protectors and reposition the patient Q 2hrs using wedges or pillows

## 2020-04-16 NOTE — PROGRESS NOTE ADULT - SUBJECTIVE AND OBJECTIVE BOX
CARDIOLOGY     PROGRESS  NOTE   ________________________________________________    CHIEF COMPLAINT:Patient is a 88y old  Female who presents with a chief complaint of fall (16 Apr 2020 12:50)  no complain.  	  REVIEW OF SYSTEMS:  CONSTITUTIONAL: No fever, weight loss, or fatigue  EYES: No eye pain, visual disturbances, or discharge  ENT:  No difficulty hearing, tinnitus, vertigo; No sinus or throat pain  NECK: No pain or stiffness  RESPIRATORY: No cough, wheezing, chills or hemoptysis; No Shortness of Breath  CARDIOVASCULAR: No chest pain, palpitations, passing out, dizziness, or leg swelling  GASTROINTESTINAL: No abdominal or epigastric pain. No nausea, vomiting, or hematemesis; No diarrhea or constipation. No melena or hematochezia.  GENITOURINARY: No dysuria, frequency, hematuria, or incontinence  NEUROLOGICAL: No headaches, memory loss, loss of strength, numbness, or tremors  SKIN: No itching, burning, rashes, or lesions   LYMPH Nodes: No enlarged glands  ENDOCRINE: No heat or cold intolerance; No hair loss  MUSCULOSKELETAL: No joint pain or swelling; No muscle, back, or extremity pain  PSYCHIATRIC: No depression, anxiety, mood swings, or difficulty sleeping  HEME/LYMPH: No easy bruising, or bleeding gums  ALLERGY AND IMMUNOLOGIC: No hives or eczema	    [ ] All others negative	  [ ] Unable to obtain    PHYSICAL EXAM:  T(C): 37.1 (04-16-20 @ 11:38), Max: 37.2 (04-15-20 @ 23:40)  HR: 83 (04-16-20 @ 11:38) (62 - 101)  BP: 122/60 (04-16-20 @ 11:38) (120/70 - 153/77)  RR: 18 (04-16-20 @ 11:38) (18 - 19)  SpO2: 99% (04-16-20 @ 11:38) (95% - 99%)  Wt(kg): --  I&O's Summary    15 Apr 2020 07:01  -  16 Apr 2020 07:00  --------------------------------------------------------  IN: 430 mL / OUT: 400 mL / NET: 30 mL        Appearance: Normal	  HEENT:   Normal oral mucosa, PERRL, EOMI	  Lymphatic: No lymphadenopathy  Cardiovascular: Normal S1 S2, No JVD, No murmurs, No edema  Respiratory: Lungs clear to auscultation	  Psychiatry: A & O x 3, Mood & affect appropriate  Gastrointestinal:  Soft, Non-tender, + BS	  Skin: No rashes, No ecchymoses, No cyanosis	  Neurologic: Non-focal  Extremities: Normal range of motion, No clubbing, cyanosis or edema  Vascular: Peripheral pulses palpable 2+ bilaterally    MEDICATIONS  (STANDING):  anastrozole 1 milliGRAM(s) Oral daily  carvedilol 12.5 milliGRAM(s) Oral every 12 hours  enoxaparin Injectable 80 milliGRAM(s) SubCutaneous two times a day  furosemide    Tablet 40 milliGRAM(s) Oral daily  simvastatin 40 milliGRAM(s) Oral at bedtime  sodium chloride 0.9%. 1000 milliLiter(s) (60 mL/Hr) IV Continuous <Continuous>      TELEMETRY: 	    ECG:  	  RADIOLOGY:  OTHER: 	  	  LABS:	 	    CARDIAC MARKERS:                                12.0   8.63  )-----------( 311      ( 16 Apr 2020 06:51 )             36.7     04-16    133<L>  |  100  |  30<H>  ----------------------------<  134<H>  4.6   |  28  |  0.92    Ca    8.9      16 Apr 2020 06:51    TPro  7.4  /  Alb  2.7<L>  /  TBili  0.8  /  DBili  x   /  AST  31  /  ALT  33  /  AlkPhos  107  04-16    proBNP: Serum Pro-Brain Natriuretic Peptide: 409 pg/mL (04-13 @ 13:57)    Lipid Profile: Cholesterol 245    HDL 57      HgA1c: Hemoglobin A1C, Whole Blood: 6.4 % (04-14 @ 09:00)    TSH: Thyroid Stimulating Hormone, Serum: 1.78 uU/mL (04-14 @ 06:45)  Thyroid Stimulating Hormone, Serum: 1.60 uU/mL (04-13 @ 13:57)  < from: TTE with Doppler (w/Cont) (04.14.20 @ 07:28) >  1. Normal mitral valve. Mild mitral regurgitation.  2.Normal trileaflet aortic valve.  3. Aortic Root: 2.7 cm.    4. Normal left atrium.  LA volume index = 34 cc/m2.  5. Normal left ventricular internal dimensions and wall  thicknesses.  6. Normal Left Ventricular Systolic Function,  (EF =  50-55%) Ultrasound LV opacification agent (Definity) was  used to enhance endocardial definition. No regional wall  motion abnormalities.  7. Grade II diastolic dysfunction.  8. Normal right atrium.  9. Normal right ventricular size and systolic function  (TAPSE2.1cm).  10. RA Pressure is 8 mm Hg.  11. RV systolic pressure is mildly increased at  39 mm Hg.  12. There is trace tricuspid regurgitation.  13. There is trace pulmonic regurgitation.  14. Normal pericardium with no pericardial effusion.    < end of copied text >          Assessment and plan  ---------------------------  87 yo F from home, with pmh of blindness,  HTN, pre diabetes,  breast cancer, arthritis presented by EMS with c/o generalized weakness. Pt states she was walking to bathroom and fell due to weakness. Patient reports falling on her right side, denies hitting head. Also, reports severe left leg swelling and pain for  past 3 days. Denies pain, fever, SOB,other acute complaints.  Spoke to family member over phone,[phone],  who informed me that patient is mostly wheel chair bound, minimally ambulates with walker.   in ER pt with rapid a.fib and +for DVT  increase beta blocker for rate control  ?PE pt with high creatinine and renal insuffiencey npt advisable for IV contrast  decrease Lovenox dose for renal insuffiencey rather heparin iv and coumadin  no need for each in view of COVID pandemic  tsh  lipid panel CARDIOLOGY     PROGRESS  NOTE   ________________________________________________    CHIEF COMPLAINT:Patient is a 88y old  Female who presents with a chief complaint of fall (16 Apr 2020 12:50)  no complain.  	  REVIEW OF SYSTEMS:  CONSTITUTIONAL: No fever, weight loss, or fatigue  EYES: No eye pain, visual disturbances, or discharge  ENT:  No difficulty hearing, tinnitus, vertigo; No sinus or throat pain  NECK: No pain or stiffness  RESPIRATORY: No cough, wheezing, chills or hemoptysis; No Shortness of Breath  CARDIOVASCULAR: No chest pain, palpitations, passing out, dizziness, or leg swelling  GASTROINTESTINAL: No abdominal or epigastric pain. No nausea, vomiting, or hematemesis; No diarrhea or constipation. No melena or hematochezia.  GENITOURINARY: No dysuria, frequency, hematuria, or incontinence  NEUROLOGICAL: No headaches, memory loss, loss of strength, numbness, or tremors  SKIN: No itching, burning, rashes, or lesions   LYMPH Nodes: No enlarged glands  ENDOCRINE: No heat or cold intolerance; No hair loss  MUSCULOSKELETAL: No joint pain or swelling; No muscle, back, or extremity pain  PSYCHIATRIC: No depression, anxiety, mood swings, or difficulty sleeping  HEME/LYMPH: No easy bruising, or bleeding gums  ALLERGY AND IMMUNOLOGIC: No hives or eczema	    [ ] All others negative	  [x ] Unable to obtain    PHYSICAL EXAM:  T(C): 37.1 (04-16-20 @ 11:38), Max: 37.2 (04-15-20 @ 23:40)  HR: 83 (04-16-20 @ 11:38) (62 - 101)  BP: 122/60 (04-16-20 @ 11:38) (120/70 - 153/77)  RR: 18 (04-16-20 @ 11:38) (18 - 19)  SpO2: 99% (04-16-20 @ 11:38) (95% - 99%)  Wt(kg): --  I&O's Summary    15 Apr 2020 07:01  -  16 Apr 2020 07:00  --------------------------------------------------------  IN: 430 mL / OUT: 400 mL / NET: 30 mL        Appearance: Normal	  HEENT:   Normal oral mucosa, PERRL, EOMI	  Lymphatic: No lymphadenopathy  Cardiovascular: Normal S1 S2, No JVD,+ murmurs,+ LLE edema  Respiratory: Lungs clear to auscultation	  Psychiatry: A & O x 3, Mood & affect appropriate  Gastrointestinal:  Soft, Non-tender, + BS	  Skin: No rashes, No ecchymoses, No cyanosis	  Neurologic: Non-focal  Extremities: Normal range of motion, No clubbing, cyanosis , +dvt  Vascular: Peripheral pulses palpable 2+ bilaterally    MEDICATIONS  (STANDING):  anastrozole 1 milliGRAM(s) Oral daily  carvedilol 12.5 milliGRAM(s) Oral every 12 hours  enoxaparin Injectable 80 milliGRAM(s) SubCutaneous two times a day  furosemide    Tablet 40 milliGRAM(s) Oral daily  simvastatin 40 milliGRAM(s) Oral at bedtime  sodium chloride 0.9%. 1000 milliLiter(s) (60 mL/Hr) IV Continuous <Continuous>      TELEMETRY: 	    ECG:  	  RADIOLOGY:  OTHER: 	  	  LABS:	 	    CARDIAC MARKERS:                                12.0   8.63  )-----------( 311      ( 16 Apr 2020 06:51 )             36.7     04-16    133<L>  |  100  |  30<H>  ----------------------------<  134<H>  4.6   |  28  |  0.92    Ca    8.9      16 Apr 2020 06:51    TPro  7.4  /  Alb  2.7<L>  /  TBili  0.8  /  DBili  x   /  AST  31  /  ALT  33  /  AlkPhos  107  04-16    proBNP: Serum Pro-Brain Natriuretic Peptide: 409 pg/mL (04-13 @ 13:57)    Lipid Profile: Cholesterol 245    HDL 57      HgA1c: Hemoglobin A1C, Whole Blood: 6.4 % (04-14 @ 09:00)    TSH: Thyroid Stimulating Hormone, Serum: 1.78 uU/mL (04-14 @ 06:45)  Thyroid Stimulating Hormone, Serum: 1.60 uU/mL (04-13 @ 13:57)  < from: TTE with Doppler (w/Cont) (04.14.20 @ 07:28) >  1. Normal mitral valve. Mild mitral regurgitation.  2.Normal trileaflet aortic valve.  3. Aortic Root: 2.7 cm.    4. Normal left atrium.  LA volume index = 34 cc/m2.  5. Normal left ventricular internal dimensions and wall  thicknesses.  6. Normal Left Ventricular Systolic Function,  (EF =  50-55%) Ultrasound LV opacification agent (Definity) was  used to enhance endocardial definition. No regional wall  motion abnormalities.  7. Grade II diastolic dysfunction.  8. Normal right atrium.  9. Normal right ventricular size and systolic function  (TAPSE2.1cm).  10. RA Pressure is 8 mm Hg.  11. RV systolic pressure is mildly increased at  39 mm Hg.  12. There is trace tricuspid regurgitation.  13. There is trace pulmonic regurgitation.  14. Normal pericardium with no pericardial effusion.    < end of copied text >          Assessment and plan  ---------------------------  89 yo F from home, with pmh of blindness,  HTN, pre diabetes,  breast cancer, arthritis presented by EMS with c/o generalized weakness. Pt states she was walking to bathroom and fell due to weakness. Patient reports falling on her right side, denies hitting head. Also, reports severe left leg swelling and pain for  past 3 days. Denies pain, fever, SOB,other acute complaints.  Spoke to family member over phone,[phone],  who informed me that patient is mostly wheel chair bound, minimally ambulates with walker.   in ER pt with rapid a.fib and +for DVT, a.fib rate is better controlled  will add digoxin if increase hr  increase beta blocker for rate control  may start on NOAC for DVT  echo noted  tsh  lipid panel  DC lasix  dc ivf  encourage po fluid  ckd improved

## 2020-04-16 NOTE — PROGRESS NOTE ADULT - SUBJECTIVE AND OBJECTIVE BOX
INTERVAL HPI/OVERNIGHT EVENTS:    No acute events overnight.   Pt resting comfortably. No acute complaints.   Tolerating diet. Denies cp. sob      MEDICATIONS  (STANDING):  anastrozole 1 milliGRAM(s) Oral daily  apixaban 10 milliGRAM(s) Oral two times a day  carvedilol 12.5 milliGRAM(s) Oral every 12 hours  simvastatin 40 milliGRAM(s) Oral at bedtime    MEDICATIONS  (PRN):  acetaminophen   Tablet .. 650 milliGRAM(s) Oral every 6 hours PRN Temp greater or equal to 38C (100.4F), Mild Pain (1 - 3)  traMADol 25 milliGRAM(s) Oral every 6 hours PRN Moderate Pain (4 - 6)  traMADol 50 milliGRAM(s) Oral every 8 hours PRN Severe Pain (7 - 10)      Vital Signs Last 24 Hrs  T(C): 36.4 (16 Apr 2020 16:18), Max: 37.2 (15 Apr 2020 23:40)  T(F): 97.5 (16 Apr 2020 16:18), Max: 98.9 (15 Apr 2020 23:40)  HR: 65 (16 Apr 2020 16:18) (65 - 101)  BP: 110/67 (16 Apr 2020 16:18) (110/67 - 153/77)  BP(mean): --  RR: 18 (16 Apr 2020 16:18) (18 - 19)  SpO2: 90% (16 Apr 2020 16:18) (90% - 99%)      PHYSICAL EXAM  General: Alert and oriented, not in acute distress  Resp: Breathing unlabored  Abdomen: Soft, nondistended, nontender  : No long catheter, no dysuria or hematuria  Extremities: LLE swelling with pain, R foot ulcer    I&O's Detail    15 Apr 2020 07:01  -  16 Apr 2020 07:00  --------------------------------------------------------  IN:    Oral Fluid: 430 mL  Total IN: 430 mL    OUT:    Voided: 400 mL  Total OUT: 400 mL    Total NET: 30 mL          LABS:                        12.0   8.63  )-----------( 311      ( 16 Apr 2020 06:51 )             36.7             04-16    133<L>  |  100  |  30<H>  ----------------------------<  134<H>  4.6   |  28  |  0.92    Ca    8.9      16 Apr 2020 06:51    TPro  7.4  /  Alb  2.7<L>  /  TBili  0.8  /  DBili  x   /  AST  31  /  ALT  33  /  AlkPhos  107  04-16

## 2020-04-16 NOTE — PHYSICAL THERAPY INITIAL EVALUATION ADULT - GENERAL OBSERVATIONS, REHAB EVAL
Patient received in semi-supine; awake and alert; reports pain on both knees (7/10); which increases w/ movement.

## 2020-04-17 LAB
ALBUMIN SERPL ELPH-MCNC: 2.7 G/DL — LOW (ref 3.5–5)
ALP SERPL-CCNC: 103 U/L — SIGNIFICANT CHANGE UP (ref 40–120)
ALT FLD-CCNC: 33 U/L DA — SIGNIFICANT CHANGE UP (ref 10–60)
ANION GAP SERPL CALC-SCNC: 8 MMOL/L — SIGNIFICANT CHANGE UP (ref 5–17)
AST SERPL-CCNC: 30 U/L — SIGNIFICANT CHANGE UP (ref 10–40)
BILIRUB SERPL-MCNC: 0.7 MG/DL — SIGNIFICANT CHANGE UP (ref 0.2–1.2)
BUN SERPL-MCNC: 25 MG/DL — HIGH (ref 7–18)
CALCIUM SERPL-MCNC: 8.8 MG/DL — SIGNIFICANT CHANGE UP (ref 8.4–10.5)
CHLORIDE SERPL-SCNC: 99 MMOL/L — SIGNIFICANT CHANGE UP (ref 96–108)
CO2 SERPL-SCNC: 28 MMOL/L — SIGNIFICANT CHANGE UP (ref 22–31)
CREAT SERPL-MCNC: 0.89 MG/DL — SIGNIFICANT CHANGE UP (ref 0.5–1.3)
GLUCOSE SERPL-MCNC: 123 MG/DL — HIGH (ref 70–99)
HCT VFR BLD CALC: 34.9 % — SIGNIFICANT CHANGE UP (ref 34.5–45)
HGB BLD-MCNC: 11.6 G/DL — SIGNIFICANT CHANGE UP (ref 11.5–15.5)
MAGNESIUM SERPL-MCNC: 2 MG/DL — SIGNIFICANT CHANGE UP (ref 1.6–2.6)
MCHC RBC-ENTMCNC: 31 PG — SIGNIFICANT CHANGE UP (ref 27–34)
MCHC RBC-ENTMCNC: 33.2 GM/DL — SIGNIFICANT CHANGE UP (ref 32–36)
MCV RBC AUTO: 93.3 FL — SIGNIFICANT CHANGE UP (ref 80–100)
NRBC # BLD: 0 /100 WBCS — SIGNIFICANT CHANGE UP (ref 0–0)
PHOSPHATE SERPL-MCNC: 2.6 MG/DL — SIGNIFICANT CHANGE UP (ref 2.5–4.5)
PLATELET # BLD AUTO: 302 K/UL — SIGNIFICANT CHANGE UP (ref 150–400)
POTASSIUM SERPL-MCNC: 4.3 MMOL/L — SIGNIFICANT CHANGE UP (ref 3.5–5.3)
POTASSIUM SERPL-SCNC: 4.3 MMOL/L — SIGNIFICANT CHANGE UP (ref 3.5–5.3)
PROT SERPL-MCNC: 7.3 G/DL — SIGNIFICANT CHANGE UP (ref 6–8.3)
RBC # BLD: 3.74 M/UL — LOW (ref 3.8–5.2)
RBC # FLD: 12.9 % — SIGNIFICANT CHANGE UP (ref 10.3–14.5)
SODIUM SERPL-SCNC: 135 MMOL/L — SIGNIFICANT CHANGE UP (ref 135–145)
WBC # BLD: 7.5 K/UL — SIGNIFICANT CHANGE UP (ref 3.8–10.5)
WBC # FLD AUTO: 7.5 K/UL — SIGNIFICANT CHANGE UP (ref 3.8–10.5)

## 2020-04-17 PROCEDURE — 73562 X-RAY EXAM OF KNEE 3: CPT | Mod: 26,RT

## 2020-04-17 RX ADMIN — SIMVASTATIN 40 MILLIGRAM(S): 20 TABLET, FILM COATED ORAL at 22:25

## 2020-04-17 RX ADMIN — APIXABAN 10 MILLIGRAM(S): 2.5 TABLET, FILM COATED ORAL at 05:33

## 2020-04-17 RX ADMIN — CARVEDILOL PHOSPHATE 12.5 MILLIGRAM(S): 80 CAPSULE, EXTENDED RELEASE ORAL at 17:38

## 2020-04-17 RX ADMIN — ANASTROZOLE 1 MILLIGRAM(S): 1 TABLET ORAL at 11:17

## 2020-04-17 RX ADMIN — APIXABAN 10 MILLIGRAM(S): 2.5 TABLET, FILM COATED ORAL at 17:38

## 2020-04-17 RX ADMIN — CARVEDILOL PHOSPHATE 12.5 MILLIGRAM(S): 80 CAPSULE, EXTENDED RELEASE ORAL at 05:33

## 2020-04-17 NOTE — PROGRESS NOTE ADULT - SUBJECTIVE AND OBJECTIVE BOX
Orthopedics    dx: rt knee severe DJD, likely hemarthrosis/mod-large hemarthrosis    pt with intermitent rt knee pain, pain increases with ROM  xrays performed today show no acute fx  on fulldose lovenox for LLE DVT  Clinically unchanged status    Impression: Pt with b/l DJD knees and rt hip DJD  plan:   - Rt knee conservative management     > not a good candidate for needle aspiration     > pt is very high risk for hemarthrosis if needle inserted and will not achieve any pain relief  - daily pt: wbat of the b/l knees and rt hip  - case dw dr mccollum  - orthopedically stable for dc today Orthopedics    dx: rt knee severe DJD, likely hemarthrosis/mod-large hemarthrosis    pt with intermitent rt knee pain, pain increases with ROM  xrays performed today show no acute fx  on fulldose lovenox for LLE DVT  Clinically unchanged status    Impression: Pt with b/l DJD knees and rt hip DJD  plan:   - Rt knee conservative management     > not a good candidate for needle aspiration     > pt is very high risk for hemarthrosis if needle inserted and will not achieve any pain relief  - daily pt: wbat of the b/l knees and rt hip  - recommend pain management consult   - case dw dr freeman  - orthopedically stable for dc today  - Follow-up with Dr. Freeman in TWO WEEKS at [phone]

## 2020-04-18 LAB
ALBUMIN SERPL ELPH-MCNC: 2.8 G/DL — LOW (ref 3.5–5)
ALP SERPL-CCNC: 108 U/L — SIGNIFICANT CHANGE UP (ref 40–120)
ALT FLD-CCNC: 33 U/L DA — SIGNIFICANT CHANGE UP (ref 10–60)
ANION GAP SERPL CALC-SCNC: 8 MMOL/L — SIGNIFICANT CHANGE UP (ref 5–17)
APTT BLD: 39.1 SEC — HIGH (ref 27.5–36.3)
AST SERPL-CCNC: 27 U/L — SIGNIFICANT CHANGE UP (ref 10–40)
BILIRUB SERPL-MCNC: 0.7 MG/DL — SIGNIFICANT CHANGE UP (ref 0.2–1.2)
BUN SERPL-MCNC: 21 MG/DL — HIGH (ref 7–18)
CALCIUM SERPL-MCNC: 9 MG/DL — SIGNIFICANT CHANGE UP (ref 8.4–10.5)
CHLORIDE SERPL-SCNC: 100 MMOL/L — SIGNIFICANT CHANGE UP (ref 96–108)
CO2 SERPL-SCNC: 28 MMOL/L — SIGNIFICANT CHANGE UP (ref 22–31)
CREAT SERPL-MCNC: 0.83 MG/DL — SIGNIFICANT CHANGE UP (ref 0.5–1.3)
GLUCOSE SERPL-MCNC: 121 MG/DL — HIGH (ref 70–99)
INR BLD: 1.6 RATIO — HIGH (ref 0.88–1.16)
MAGNESIUM SERPL-MCNC: 2.1 MG/DL — SIGNIFICANT CHANGE UP (ref 1.6–2.6)
POTASSIUM SERPL-MCNC: 4.4 MMOL/L — SIGNIFICANT CHANGE UP (ref 3.5–5.3)
POTASSIUM SERPL-SCNC: 4.4 MMOL/L — SIGNIFICANT CHANGE UP (ref 3.5–5.3)
PROT SERPL-MCNC: 7.6 G/DL — SIGNIFICANT CHANGE UP (ref 6–8.3)
PROTHROM AB SERPL-ACNC: 18.3 SEC — HIGH (ref 10–12.9)
SODIUM SERPL-SCNC: 136 MMOL/L — SIGNIFICANT CHANGE UP (ref 135–145)

## 2020-04-18 RX ORDER — POLYETHYLENE GLYCOL 3350 17 G/17G
17 POWDER, FOR SOLUTION ORAL DAILY
Refills: 0 | Status: DISCONTINUED | OUTPATIENT
Start: 2020-04-18 | End: 2020-04-20

## 2020-04-18 RX ORDER — SENNA PLUS 8.6 MG/1
2 TABLET ORAL AT BEDTIME
Refills: 0 | Status: DISCONTINUED | OUTPATIENT
Start: 2020-04-18 | End: 2020-04-20

## 2020-04-18 RX ADMIN — SENNA PLUS 2 TABLET(S): 8.6 TABLET ORAL at 21:27

## 2020-04-18 RX ADMIN — CARVEDILOL PHOSPHATE 12.5 MILLIGRAM(S): 80 CAPSULE, EXTENDED RELEASE ORAL at 17:04

## 2020-04-18 RX ADMIN — ANASTROZOLE 1 MILLIGRAM(S): 1 TABLET ORAL at 11:38

## 2020-04-18 RX ADMIN — APIXABAN 10 MILLIGRAM(S): 2.5 TABLET, FILM COATED ORAL at 06:56

## 2020-04-18 RX ADMIN — TRAMADOL HYDROCHLORIDE 50 MILLIGRAM(S): 50 TABLET ORAL at 20:05

## 2020-04-18 RX ADMIN — CARVEDILOL PHOSPHATE 12.5 MILLIGRAM(S): 80 CAPSULE, EXTENDED RELEASE ORAL at 06:56

## 2020-04-18 RX ADMIN — SIMVASTATIN 40 MILLIGRAM(S): 20 TABLET, FILM COATED ORAL at 21:27

## 2020-04-18 RX ADMIN — APIXABAN 10 MILLIGRAM(S): 2.5 TABLET, FILM COATED ORAL at 17:04

## 2020-04-18 NOTE — PROGRESS NOTE ADULT - SUBJECTIVE AND OBJECTIVE BOX
Patient was seen and examined  Patient is a 88y old  Female who presents with a chief complaint of fall (17 Apr 2020 13:59)      INTERVAL HPI/OVERNIGHT EVENTS:  T(C): 37.1 (04-18-20 @ 04:45), Max: 37.1 (04-17-20 @ 07:36)  HR: 102 (04-18-20 @ 04:45) (66 - 102)  BP: 135/56 (04-18-20 @ 04:45) (104/73 - 135/56)  RR: 18 (04-18-20 @ 04:45) (18 - 20)  SpO2: 97% (04-18-20 @ 04:45) (95% - 100%)  Wt(kg): --  I&O's Summary      LABS:                        11.6   7.50  )-----------( 302      ( 17 Apr 2020 07:06 )             34.9     04-17    135  |  99  |  25<H>  ----------------------------<  123<H>  4.3   |  28  |  0.89    Ca    8.8      17 Apr 2020 07:06  Phos  2.6     04-17  Mg     2.0     04-17    TPro  7.3  /  Alb  2.7<L>  /  TBili  0.7  /  DBili  x   /  AST  30  /  ALT  33  /  AlkPhos  103  04-17        CAPILLARY BLOOD GLUCOSE                  MEDICATIONS  (STANDING):  anastrozole 1 milliGRAM(s) Oral daily  apixaban 10 milliGRAM(s) Oral two times a day  carvedilol 12.5 milliGRAM(s) Oral every 12 hours  simvastatin 40 milliGRAM(s) Oral at bedtime    MEDICATIONS  (PRN):  acetaminophen   Tablet .. 650 milliGRAM(s) Oral every 6 hours PRN Temp greater or equal to 38C (100.4F), Mild Pain (1 - 3)  traMADol 25 milliGRAM(s) Oral every 6 hours PRN Moderate Pain (4 - 6)  traMADol 50 milliGRAM(s) Oral every 8 hours PRN Severe Pain (7 - 10)      RADIOLOGY & ADDITIONAL TESTS:    Imaging Personally Reviewed:  [ ] YES  [ ] NO    REVIEW OF System   knee pain     Consultant(s) Notes Reviewed:  [ x ] YES  [ ] NO    PHYSICAL EXAM:  GENERAL: NAD, well-groomed, well-developed  HEAD:  Atraumatic, Normocephalic  EYES: Eblind   ENMT: No tonsillar erythema, exudates, or enlargement; Moist mucous membranes, Good dentition, No lesions  NECK: Supple, No JVD, Normal thyroid  NERVOUS SYSTEM:  Alert & Oriented X3, Good concentration; Motor Strength 5/5 B/L upper and lower extremities; DTRs 2+ intact and symmetric  CHEST/LUNG: Clear to percussion bilaterally; No rales, rhonchi, wheezing, or rubs  HEART: Regular rate and rhythm; No murmurs, rubs, or gallops  ABDOMEN: Soft, Nontender, Nondistended; Bowel sounds present  EXTREMITIES:  knee swelling left more than right   LYMPH: No lymphadenopathy noted  SKIN: No rashes or lesions    Care Discussed with Consultants/Other Providers [ x] YES  [ ] NO

## 2020-04-19 LAB
ANION GAP SERPL CALC-SCNC: 5 MMOL/L — SIGNIFICANT CHANGE UP (ref 5–17)
BUN SERPL-MCNC: 20 MG/DL — HIGH (ref 7–18)
CALCIUM SERPL-MCNC: 8.8 MG/DL — SIGNIFICANT CHANGE UP (ref 8.4–10.5)
CHLORIDE SERPL-SCNC: 102 MMOL/L — SIGNIFICANT CHANGE UP (ref 96–108)
CO2 SERPL-SCNC: 29 MMOL/L — SIGNIFICANT CHANGE UP (ref 22–31)
CREAT SERPL-MCNC: 0.88 MG/DL — SIGNIFICANT CHANGE UP (ref 0.5–1.3)
GLUCOSE SERPL-MCNC: 123 MG/DL — HIGH (ref 70–99)
HCT VFR BLD CALC: 34 % — LOW (ref 34.5–45)
HGB BLD-MCNC: 11.1 G/DL — LOW (ref 11.5–15.5)
MCHC RBC-ENTMCNC: 30.5 PG — SIGNIFICANT CHANGE UP (ref 27–34)
MCHC RBC-ENTMCNC: 32.6 GM/DL — SIGNIFICANT CHANGE UP (ref 32–36)
MCV RBC AUTO: 93.4 FL — SIGNIFICANT CHANGE UP (ref 80–100)
NRBC # BLD: 0 /100 WBCS — SIGNIFICANT CHANGE UP (ref 0–0)
PLATELET # BLD AUTO: 315 K/UL — SIGNIFICANT CHANGE UP (ref 150–400)
POTASSIUM SERPL-MCNC: 4.4 MMOL/L — SIGNIFICANT CHANGE UP (ref 3.5–5.3)
POTASSIUM SERPL-SCNC: 4.4 MMOL/L — SIGNIFICANT CHANGE UP (ref 3.5–5.3)
RBC # BLD: 3.64 M/UL — LOW (ref 3.8–5.2)
RBC # FLD: 13.1 % — SIGNIFICANT CHANGE UP (ref 10.3–14.5)
SODIUM SERPL-SCNC: 136 MMOL/L — SIGNIFICANT CHANGE UP (ref 135–145)
WBC # BLD: 7.84 K/UL — SIGNIFICANT CHANGE UP (ref 3.8–10.5)
WBC # FLD AUTO: 7.84 K/UL — SIGNIFICANT CHANGE UP (ref 3.8–10.5)

## 2020-04-19 RX ADMIN — CARVEDILOL PHOSPHATE 12.5 MILLIGRAM(S): 80 CAPSULE, EXTENDED RELEASE ORAL at 06:57

## 2020-04-19 RX ADMIN — APIXABAN 10 MILLIGRAM(S): 2.5 TABLET, FILM COATED ORAL at 17:29

## 2020-04-19 RX ADMIN — APIXABAN 10 MILLIGRAM(S): 2.5 TABLET, FILM COATED ORAL at 06:57

## 2020-04-19 RX ADMIN — CARVEDILOL PHOSPHATE 12.5 MILLIGRAM(S): 80 CAPSULE, EXTENDED RELEASE ORAL at 17:29

## 2020-04-19 RX ADMIN — SENNA PLUS 2 TABLET(S): 8.6 TABLET ORAL at 22:32

## 2020-04-19 RX ADMIN — ANASTROZOLE 1 MILLIGRAM(S): 1 TABLET ORAL at 11:10

## 2020-04-19 RX ADMIN — SIMVASTATIN 40 MILLIGRAM(S): 20 TABLET, FILM COATED ORAL at 22:32

## 2020-04-19 RX ADMIN — POLYETHYLENE GLYCOL 3350 17 GRAM(S): 17 POWDER, FOR SOLUTION ORAL at 11:10

## 2020-04-19 NOTE — PROGRESS NOTE ADULT - ASSESSMENT
88 yoF h/o blindness, HTN, R breast CA, RA and chronic BLE edema; now with LLE DVT     - pt on therapeutic lovenox, continue  - A fib, on carvedilol rate controlled, Dr. Valle following; rec'ed if HR uncontrolled consider digoxin and uptitrating BB  - WOC RN for R foot ulcer; rec'ed medihoney ointment with foam dressing  - R knee pain, ortho consulted, f/u XR and NWB for now  - previous DEAN/PVR in July/2019, wnl, no evidence of vascular compromise   - PT eval for amb status once cleared per XR/ortho and dispo rec    REHAB PLACEMENT

## 2020-04-19 NOTE — PROGRESS NOTE ADULT - SUBJECTIVE AND OBJECTIVE BOX
CARDIOLOGY     PROGRESS  NOTE   ________________________________________________    CHIEF COMPLAINT:Patient is a 88y old  Female who presents with a chief complaint of fall (19 Apr 2020 10:19)  no complain.  	  REVIEW OF SYSTEMS:  CONSTITUTIONAL: No fever, weight loss, or fatigue  EYES: No eye pain, visual disturbances, or discharge  ENT:  No difficulty hearing, tinnitus, vertigo; No sinus or throat pain  NECK: No pain or stiffness  RESPIRATORY: No cough, wheezing, chills or hemoptysis; + Shortness of Breath  CARDIOVASCULAR: No chest pain, palpitations, passing out, dizziness, or leg swelling  GASTROINTESTINAL: No abdominal or epigastric pain. No nausea, vomiting, or hematemesis; No diarrhea or constipation. No melena or hematochezia.  GENITOURINARY: No dysuria, frequency, hematuria, or incontinence  NEUROLOGICAL: No headaches, memory loss, loss of strength, numbness, or tremors  SKIN: No itching, burning, rashes, or lesions   LYMPH Nodes: No enlarged glands  ENDOCRINE: No heat or cold intolerance; No hair loss  MUSCULOSKELETAL: No joint pain or swelling; No muscle, back, or extremity pain  PSYCHIATRIC: No depression, anxiety, mood swings, or difficulty sleeping  HEME/LYMPH: No easy bruising, or bleeding gums  ALLERGY AND IMMUNOLOGIC: No hives or eczema	    [ ] All others negative	  [ ] Unable to obtain    PHYSICAL EXAM:  T(C): 36.8 (04-19-20 @ 12:07), Max: 36.9 (04-18-20 @ 23:12)  HR: 81 (04-19-20 @ 12:07) (76 - 107)  BP: 103/59 (04-19-20 @ 12:07) (101/72 - 124/85)  RR: 19 (04-19-20 @ 12:07) (18 - 19)  SpO2: 98% (04-19-20 @ 12:07) (94% - 100%)  Wt(kg): --  I&O's Summary    18 Apr 2020 07:01  -  19 Apr 2020 07:00  --------------------------------------------------------  IN: 50 mL / OUT: 400 mL / NET: -350 mL        Appearance: Normal	  HEENT:   Normal oral mucosa, PERRL, EOMI	  Lymphatic: No lymphadenopathy  Cardiovascular: Normal S1 S2, No JVD, + murmurs  Respiratory: Lungs clear to auscultation	  Psychiatry: A & O x 3, Mood & affect appropriate  Gastrointestinal:  Soft, Non-tender, + BS	  Skin: No rashes, No ecchymoses, No cyanosis	  Neurologic: Non-focal  Extremities: Normal range of motion, No clubbing, cyanosis + LLE  edema  Vascular: Peripheral pulses palpable 2+ bilaterally    MEDICATIONS  (STANDING):  anastrozole 1 milliGRAM(s) Oral daily  apixaban 10 milliGRAM(s) Oral two times a day  carvedilol 12.5 milliGRAM(s) Oral every 12 hours  polyethylene glycol 3350 17 Gram(s) Oral daily  senna 2 Tablet(s) Oral at bedtime  simvastatin 40 milliGRAM(s) Oral at bedtime      TELEMETRY: 	    ECG:  	  RADIOLOGY:  OTHER: 	  	  LABS:	 	    CARDIAC MARKERS:                                11.1   7.84  )-----------( 315      ( 19 Apr 2020 06:13 )             34.0     04-19    136  |  102  |  20<H>  ----------------------------<  123<H>  4.4   |  29  |  0.88    Ca    8.8      19 Apr 2020 06:13  Mg     2.1     04-18    TPro  7.6  /  Alb  2.8<L>  /  TBili  0.7  /  DBili  x   /  AST  27  /  ALT  33  /  AlkPhos  108  04-18    proBNP: Serum Pro-Brain Natriuretic Peptide: 409 pg/mL (04-13 @ 13:57)    Lipid Profile: Cholesterol 245    HDL 57      HgA1c: Hemoglobin A1C, Whole Blood: 6.4 % (04-14 @ 09:00)    TSH: Thyroid Stimulating Hormone, Serum: 1.78 uU/mL (04-14 @ 06:45)  Thyroid Stimulating Hormone, Serum: 1.60 uU/mL (04-13 @ 13:57)    PT/INR - ( 18 Apr 2020 07:20 )   PT: 18.3 sec;   INR: 1.60 ratio         PTT - ( 18 Apr 2020 07:20 )  PTT:39.1 sec      Assessment and plan  ---------------------------  89 yo F from home, with pmh of blindness,  HTN, pre diabetes,  breast cancer, arthritis presented by EMS with c/o generalized weakness. Pt states she was walking to bathroom and fell due to weakness. Patient reports falling on her right side, denies hitting head. Also, reports severe left leg swelling and pain for  past 3 days. Denies pain, fever, SOB,other acute complaints.  Spoke to family member over phone,[phone],  who informed me that patient is mostly wheel chair bound, minimally ambulates with walker.   in ER pt with rapid a.fib and +for DVT, a.fib rate is better controlled  will add digoxin if increase hr  increase beta blocker for rate control  may start on NOAC for DVT need to change Eliquis to 5 mg bid after 7 days  echo noted  tsh  lipid panel noted on med  DC lasix  dc ivf  encourage po fluid  ckd has resolved CARDIOLOGY     PROGRESS  NOTE   ________________________________________________    CHIEF COMPLAINT:Patient is a 88y old  Female who presents with a chief complaint of fall (19 Apr 2020 10:19)  no complain.  	  REVIEW OF SYSTEMS:  CONSTITUTIONAL: No fever, weight loss, or fatigue  EYES: No eye pain, visual disturbances, or discharge  ENT:  No difficulty hearing, tinnitus, vertigo; No sinus or throat pain  NECK: No pain or stiffness  RESPIRATORY: No cough, wheezing, chills or hemoptysis; + Shortness of Breath  CARDIOVASCULAR: No chest pain, palpitations, passing out, dizziness, or leg swelling  GASTROINTESTINAL: No abdominal or epigastric pain. No nausea, vomiting, or hematemesis; No diarrhea or constipation. No melena or hematochezia.  GENITOURINARY: No dysuria, frequency, hematuria, or incontinence  NEUROLOGICAL: No headaches, memory loss, loss of strength, numbness, or tremors  SKIN: No itching, burning, rashes, or lesions   LYMPH Nodes: No enlarged glands  ENDOCRINE: No heat or cold intolerance; No hair loss  MUSCULOSKELETAL: No joint pain or swelling; No muscle, back, or extremity pain  PSYCHIATRIC: No depression, anxiety, mood swings, or difficulty sleeping  HEME/LYMPH: No easy bruising, or bleeding gums  ALLERGY AND IMMUNOLOGIC: No hives or eczema	    [ ] All others negative	  [ ] Unable to obtain    PHYSICAL EXAM:  T(C): 36.8 (04-19-20 @ 12:07), Max: 36.9 (04-18-20 @ 23:12)  HR: 81 (04-19-20 @ 12:07) (76 - 107)  BP: 103/59 (04-19-20 @ 12:07) (101/72 - 124/85)  RR: 19 (04-19-20 @ 12:07) (18 - 19)  SpO2: 98% (04-19-20 @ 12:07) (94% - 100%)  Wt(kg): --  I&O's Summary    18 Apr 2020 07:01  -  19 Apr 2020 07:00  --------------------------------------------------------  IN: 50 mL / OUT: 400 mL / NET: -350 mL        Appearance: Normal	  HEENT:   Normal oral mucosa, PERRL, EOMI	  Lymphatic: No lymphadenopathy  Cardiovascular: Normal S1 S2, No JVD, + murmurs  Respiratory: Lungs clear to auscultation	  Psychiatry: A & O x 3, Mood & affect appropriate  Gastrointestinal:  Soft, Non-tender, + BS	  Skin: No rashes, No ecchymoses, No cyanosis	  Neurologic: Non-focal  Extremities: Normal range of motion, No clubbing, cyanosis + LLE  edema  Vascular: Peripheral pulses palpable 2+ bilaterally    MEDICATIONS  (STANDING):  anastrozole 1 milliGRAM(s) Oral daily  apixaban 10 milliGRAM(s) Oral two times a day  carvedilol 12.5 milliGRAM(s) Oral every 12 hours  polyethylene glycol 3350 17 Gram(s) Oral daily  senna 2 Tablet(s) Oral at bedtime  simvastatin 40 milliGRAM(s) Oral at bedtime      TELEMETRY: 	    ECG:  	  RADIOLOGY:  OTHER: 	  	  LABS:	 	    CARDIAC MARKERS:                                11.1   7.84  )-----------( 315      ( 19 Apr 2020 06:13 )             34.0     04-19    136  |  102  |  20<H>  ----------------------------<  123<H>  4.4   |  29  |  0.88    Ca    8.8      19 Apr 2020 06:13  Mg     2.1     04-18    TPro  7.6  /  Alb  2.8<L>  /  TBili  0.7  /  DBili  x   /  AST  27  /  ALT  33  /  AlkPhos  108  04-18    proBNP: Serum Pro-Brain Natriuretic Peptide: 409 pg/mL (04-13 @ 13:57)    Lipid Profile: Cholesterol 245    HDL 57      HgA1c: Hemoglobin A1C, Whole Blood: 6.4 % (04-14 @ 09:00)    TSH: Thyroid Stimulating Hormone, Serum: 1.78 uU/mL (04-14 @ 06:45)  Thyroid Stimulating Hormone, Serum: 1.60 uU/mL (04-13 @ 13:57)    PT/INR - ( 18 Apr 2020 07:20 )   PT: 18.3 sec;   INR: 1.60 ratio         PTT - ( 18 Apr 2020 07:20 )  PTT:39.1 sec      Assessment and plan  ---------------------------  87 yo F from home, with pmh of blindness,  HTN, pre diabetes,  breast cancer, arthritis presented by EMS with c/o generalized weakness. Pt states she was walking to bathroom and fell due to weakness. Patient reports falling on her right side, denies hitting head. Also, reports severe left leg swelling and pain for  past 3 days. Denies pain, fever, SOB,other acute complaints.  Spoke to family member over phone,[phone],  who informed me that patient is mostly wheel chair bound, minimally ambulates with walker.   in ER pt with rapid a.fib and +for DVT, a.fib rate is better controlled  will add digoxin if increase hr  increase beta blocker for rate control  may start on NOAC for DVT need to change Eliquis dose  to 5 mg bid after 7 days  echo noted  tsh  lipid panel noted on med  DC lasix  dc ivf  encourage po fluid  ckd has resolved

## 2020-04-19 NOTE — DIETITIAN INITIAL EVALUATION ADULT. - FACTORS AFF FOOD INTAKE
tolerating diet/difficulty chewing/persistent constipation/[employer]/ethnic/cultural/personal food preferences

## 2020-04-19 NOTE — PROGRESS NOTE ADULT - SUBJECTIVE AND OBJECTIVE BOX
Patient was seen and examined  Patient is a 88y old  Female who presents with a chief complaint of fall (18 Apr 2020 07:14)    DENIED ANY DISCOMFORT     INTERVAL HPI/OVERNIGHT EVENTS:  T(C): 36.7 (04-19-20 @ 08:44), Max: 37.2 (04-18-20 @ 11:10)  HR: 83 (04-19-20 @ 08:44) (76 - 107)  BP: 101/72 (04-19-20 @ 08:44) (101/72 - 131/69)  RR: 19 (04-19-20 @ 08:44) (18 - 20)  SpO2: 96% (04-19-20 @ 08:44) (94% - 100%)  Wt(kg): --  I&O's Summary    18 Apr 2020 07:01  -  19 Apr 2020 07:00  --------------------------------------------------------  IN: 50 mL / OUT: 400 mL / NET: -350 mL        LABS:                        11.1   7.84  )-----------( 315      ( 19 Apr 2020 06:13 )             34.0     04-19    136  |  102  |  20<H>  ----------------------------<  123<H>  4.4   |  29  |  0.88    Ca    8.8      19 Apr 2020 06:13  Mg     2.1     04-18    TPro  7.6  /  Alb  2.8<L>  /  TBili  0.7  /  DBili  x   /  AST  27  /  ALT  33  /  AlkPhos  108  04-18    PT/INR - ( 18 Apr 2020 07:20 )   PT: 18.3 sec;   INR: 1.60 ratio         PTT - ( 18 Apr 2020 07:20 )  PTT:39.1 sec    CAPILLARY BLOOD GLUCOSE                  MEDICATIONS  (STANDING):  anastrozole 1 milliGRAM(s) Oral daily  apixaban 10 milliGRAM(s) Oral two times a day  carvedilol 12.5 milliGRAM(s) Oral every 12 hours  polyethylene glycol 3350 17 Gram(s) Oral daily  senna 2 Tablet(s) Oral at bedtime  simvastatin 40 milliGRAM(s) Oral at bedtime    MEDICATIONS  (PRN):  acetaminophen   Tablet .. 650 milliGRAM(s) Oral every 6 hours PRN Temp greater or equal to 38C (100.4F), Mild Pain (1 - 3)  traMADol 25 milliGRAM(s) Oral every 6 hours PRN Moderate Pain (4 - 6)  traMADol 50 milliGRAM(s) Oral every 8 hours PRN Severe Pain (7 - 10)      RADIOLOGY & ADDITIONAL TESTS:    Imaging Personally Reviewed:  [ ] YES  [ ] NO    REVIEW OF SYSTEMS:  CONSTITUTIONAL: No fever, weight loss, or fatigue  EYES: No eye pain, visual disturbances, or discharge  ENMT:  No difficulty hearing, tinnitus, vertigo; No sinus or throat pain  NECK: No pain or stiffness  BREASTS: No pain, masses, or nipple discharge  RESPIRATORY: No cough, wheezing, chills or hemoptysis; No shortness of breath  CARDIOVASCULAR: No chest pain, palpitations, dizziness, or leg swelling  GASTROINTESTINAL: No abdominal or epigastric pain. No nausea, vomiting, or hematemesis; No diarrhea or constipation. No melena or hematochezia.  GENITOURINARY: No dysuria, frequency, hematuria, or incontinence  NEUROLOGICAL: No headaches, memory loss, loss of strength, numbness, or tremors  SKIN: No itching, burning, rashes, or lesions   LYMPH NODES: No enlarged glands  ENDOCRINE: No heat or cold intolerance; No hair loss  MUSCULOSKELETAL: No joint pain or swelling; No muscle, back, or extremity pain  PSYCHIATRIC: No depression, anxiety, mood swings, or difficulty sleeping  HEME/LYMPH: No easy bruising, or bleeding gums  ALLERY AND IMMUNOLOGIC: No hives or eczema      Consultant(s) Notes Reviewed:  [ ] YES  [ ] NO    PHYSICAL EXAM:  GENERAL: NAD, well-groomed, well-developed  HEAD:  Atraumatic, Normocephalic  EYES: blind   ENMT: No tonsillar erythema, exudates, or enlargement; Moist mucous membranes, Good dentition, No lesions  NECK: Supple, No JVD, Normal thyroid  NERVOUS SYSTEM:  Alert & Oriented X3, Good concentration; Motor Strength 5/5 B/L upper and lower extremities; DTRs 2+ intact and symmetric  CHEST/LUNG: Clear to percussion bilaterally; No rales, rhonchi, wheezing, or rubs  HEART: Regular rate and rhythm; No murmurs, rubs, or gallops  ABDOMEN: Soft, Nontender, Nondistended; Bowel sounds present  EXTREMITIES: bl knee swelling left more than right   LYMPH: No lymphadenopathy noted  SKIN: No rashes or lesions    Care Discussed with Consultants/Other Providers [ x] YES  [ ] NO Patient was seen and examined  Patient is a 88y old  Female who presents with a chief complaint of fall (18 Apr 2020 07:14)    DENIED ANY DISCOMFORT     INTERVAL HPI/OVERNIGHT EVENTS:  T(C): 36.7 (04-19-20 @ 08:44), Max: 37.2 (04-18-20 @ 11:10)  HR: 83 (04-19-20 @ 08:44) (76 - 107)  BP: 101/72 (04-19-20 @ 08:44) (101/72 - 131/69)  RR: 19 (04-19-20 @ 08:44) (18 - 20)  SpO2: 96% (04-19-20 @ 08:44) (94% - 100%)  Wt(kg): --  I&O's Summary    18 Apr 2020 07:01  -  19 Apr 2020 07:00  --------------------------------------------------------  IN: 50 mL / OUT: 400 mL / NET: -350 mL        LABS:                        11.1   7.84  )-----------( 315      ( 19 Apr 2020 06:13 )             34.0     04-19    136  |  102  |  20<H>  ----------------------------<  123<H>  4.4   |  29  |  0.88    Ca    8.8      19 Apr 2020 06:13  Mg     2.1     04-18    TPro  7.6  /  Alb  2.8<L>  /  TBili  0.7  /  DBili  x   /  AST  27  /  ALT  33  /  AlkPhos  108  04-18    PT/INR - ( 18 Apr 2020 07:20 )   PT: 18.3 sec;   INR: 1.60 ratio         PTT - ( 18 Apr 2020 07:20 )  PTT:39.1 sec    CAPILLARY BLOOD GLUCOSE                  MEDICATIONS  (STANDING):  anastrozole 1 milliGRAM(s) Oral daily  apixaban 10 milliGRAM(s) Oral two times a day  carvedilol 12.5 milliGRAM(s) Oral every 12 hours  polyethylene glycol 3350 17 Gram(s) Oral daily  senna 2 Tablet(s) Oral at bedtime  simvastatin 40 milliGRAM(s) Oral at bedtime    MEDICATIONS  (PRN):  acetaminophen   Tablet .. 650 milliGRAM(s) Oral every 6 hours PRN Temp greater or equal to 38C (100.4F), Mild Pain (1 - 3)  traMADol 25 milliGRAM(s) Oral every 6 hours PRN Moderate Pain (4 - 6)  traMADol 50 milliGRAM(s) Oral every 8 hours PRN Severe Pain (7 - 10)      RADIOLOGY & ADDITIONAL TESTS:    Imaging Personally Reviewed:  [ ] YES  [ ] NO    REVIEW OF SYSTEMS:  CONSTITUTIONAL: No fever, weight loss, or fatigue  EYES: No eye pain, visual disturbances, or discharge  ENMT:  No difficulty hearing, tinnitus, vertigo; No sinus or throat pain  NECK: No pain or stiffness  BREASTS: No pain, masses, or nipple discharge  RESPIRATORY: No cough, wheezing, chills or hemoptysis; No shortness of breath  CARDIOVASCULAR: No chest pain, palpitations, dizziness, or leg swelling  GASTROINTESTINAL: No abdominal or epigastric pain. No nausea, vomiting, or hematemesis; No diarrhea or constipation. No melena or hematochezia.  GENITOURINARY: No dysuria, frequency, hematuria, or incontinence  NEUROLOGICAL: No headaches, memory loss, loss of strength, numbness, or tremors  SKIN: No itching, burning, rashes, or lesions   LYMPH NODES: No enlarged glands  ENDOCRINE: No heat or cold intolerance; No hair loss  MUSCULOSKELETAL: No joint pain or swelling; No muscle, back, or extremity pain  PSYCHIATRIC: No depression, anxiety, mood swings, or difficulty sleeping  HEME/LYMPH: No easy bruising, or bleeding gums  ALLERY AND IMMUNOLOGIC: No hives or eczema      Consultant(s) Notes Reviewed:  [ ] YES  [ ] NO    PHYSICAL EXAM:  GENERAL: NAD, well-groomed, well-developed  HEAD:  Atraumatic, Normocephalic  EYES: blind   ENMT: No tonsillar erythema, exudates, or enlargement; Moist mucous membranes, Good dentition, No lesions  NECK: Supple, No JVD, Normal thyroid  NERVOUS SYSTEM:  Alert & Oriented X3, Good concentration; Motor Strength 5/5 B/L upper and lower extremities; DTRs 2+ intact and symmetric  CHEST/LUNG: Clear to percussion bilaterally; No rales, rhonchi, wheezing, or rubs  HEART: Regular rate and rhythm; No murmurs, rubs, or gallops  ABDOMEN: Soft, Nontender, Nondistended; Bowel sounds present  EXTREMITIES: bl knee swelling right more than left  LYMPH: No lymphadenopathy noted  SKIN: No rashes or lesions    Care Discussed with Consultants/Other Providers [ x] YES  [ ] NO

## 2020-04-19 NOTE — CHART NOTE - NSCHARTNOTEFT_GEN_A_CORE
Upon Nutritional Assessment by the Registered Dietitian your patient was determined to meet criteria / has evidence of the following diagnosis/diagnoses:          [ ]  Mild Protein Calorie Malnutrition        [ ]  Moderate Protein Calorie Malnutrition        [ ] Severe Protein Calorie Malnutrition        [ ] Unspecified Protein Calorie Malnutrition        [ ] Underweight / BMI <19        [x ] Morbid Obesity / BMI > 40      Findings as based on:  •  Comprehensive nutrition assessment and consultation  •  Calorie counts (nutrient intake analysis)  •  Food acceptance and intake status from observations by staff  •  Follow up  •  Patient education  •  Intervention secondary to interdisciplinary rounds  •   concerns      Treatment:    The following diet has been recommended:      PROVIDER Section:     By signing this assessment you are acknowledging and agree with the diagnosis/diagnoses assigned by the Registered Dietitian    Comments:  continue with Mechanical soft, carbohydrate consistent ,DASH/TLC diet , No egg, No poultry

## 2020-04-19 NOTE — DIETITIAN INITIAL EVALUATION ADULT. - PERTINENT LABORATORY DATA
04-19 Na136 mmol/L Glu 123 mg/dL<H> K+ 4.4 mmol/L Cr  0.88 mg/dL BUN 20 mg/dL<H> 04-17 Phos 2.6 mg/dL 04-18 Alb 2.8 g/dL<L> 04-14 [id number] 6.4 %<H> 04-14 Chol 245 mg/dL<H>  mg/dL HDL 57 mg/dL Trig 134 mg/dL

## 2020-04-20 ENCOUNTER — TRANSCRIPTION ENCOUNTER (OUTPATIENT)
Age: 85
End: 2020-04-20

## 2020-04-20 VITALS
TEMPERATURE: 99 F | OXYGEN SATURATION: 96 % | DIASTOLIC BLOOD PRESSURE: 76 MMHG | SYSTOLIC BLOOD PRESSURE: 114 MMHG | HEART RATE: 83 BPM | RESPIRATION RATE: 18 BRPM

## 2020-04-20 PROCEDURE — 36415 COLL VENOUS BLD VENIPUNCTURE: CPT

## 2020-04-20 PROCEDURE — 84443 ASSAY THYROID STIM HORMONE: CPT

## 2020-04-20 PROCEDURE — 93005 ELECTROCARDIOGRAM TRACING: CPT

## 2020-04-20 PROCEDURE — 86900 BLOOD TYPING SEROLOGIC ABO: CPT

## 2020-04-20 PROCEDURE — 82746 ASSAY OF FOLIC ACID SERUM: CPT

## 2020-04-20 PROCEDURE — 96374 THER/PROPH/DIAG INJ IV PUSH: CPT

## 2020-04-20 PROCEDURE — 84100 ASSAY OF PHOSPHORUS: CPT

## 2020-04-20 PROCEDURE — 82962 GLUCOSE BLOOD TEST: CPT

## 2020-04-20 PROCEDURE — 83036 HEMOGLOBIN GLYCOSYLATED A1C: CPT

## 2020-04-20 PROCEDURE — 82607 VITAMIN B-12: CPT

## 2020-04-20 PROCEDURE — 97162 PT EVAL MOD COMPLEX 30 MIN: CPT

## 2020-04-20 PROCEDURE — 73562 X-RAY EXAM OF KNEE 3: CPT

## 2020-04-20 PROCEDURE — 84436 ASSAY OF TOTAL THYROXINE: CPT

## 2020-04-20 PROCEDURE — 99285 EMERGENCY DEPT VISIT HI MDM: CPT | Mod: 25

## 2020-04-20 PROCEDURE — 83605 ASSAY OF LACTIC ACID: CPT

## 2020-04-20 PROCEDURE — 93306 TTE W/DOPPLER COMPLETE: CPT

## 2020-04-20 PROCEDURE — 84484 ASSAY OF TROPONIN QUANT: CPT

## 2020-04-20 PROCEDURE — 80061 LIPID PANEL: CPT

## 2020-04-20 PROCEDURE — 80053 COMPREHEN METABOLIC PANEL: CPT

## 2020-04-20 PROCEDURE — 83880 ASSAY OF NATRIURETIC PEPTIDE: CPT

## 2020-04-20 PROCEDURE — 83735 ASSAY OF MAGNESIUM: CPT

## 2020-04-20 PROCEDURE — 82550 ASSAY OF CK (CPK): CPT

## 2020-04-20 PROCEDURE — 82306 VITAMIN D 25 HYDROXY: CPT

## 2020-04-20 PROCEDURE — 85730 THROMBOPLASTIN TIME PARTIAL: CPT

## 2020-04-20 PROCEDURE — 70450 CT HEAD/BRAIN W/O DYE: CPT

## 2020-04-20 PROCEDURE — 80048 BASIC METABOLIC PNL TOTAL CA: CPT

## 2020-04-20 PROCEDURE — 85610 PROTHROMBIN TIME: CPT

## 2020-04-20 PROCEDURE — 86850 RBC ANTIBODY SCREEN: CPT

## 2020-04-20 PROCEDURE — 85027 COMPLETE CBC AUTOMATED: CPT

## 2020-04-20 PROCEDURE — 93971 EXTREMITY STUDY: CPT

## 2020-04-20 PROCEDURE — 71045 X-RAY EXAM CHEST 1 VIEW: CPT

## 2020-04-20 PROCEDURE — 82803 BLOOD GASES ANY COMBINATION: CPT

## 2020-04-20 PROCEDURE — 86901 BLOOD TYPING SEROLOGIC RH(D): CPT

## 2020-04-20 PROCEDURE — 72170 X-RAY EXAM OF PELVIS: CPT

## 2020-04-20 RX ORDER — APIXABAN 2.5 MG/1
1 TABLET, FILM COATED ORAL
Qty: 194 | Refills: 0
Start: 2020-04-20 | End: 2020-07-18

## 2020-04-20 RX ORDER — FUROSEMIDE 40 MG
1 TABLET ORAL
Qty: 30 | Refills: 0
Start: 2020-04-20 | End: 2020-05-19

## 2020-04-20 RX ORDER — FUROSEMIDE 40 MG
0.5 TABLET ORAL
Qty: 0 | Refills: 0 | DISCHARGE
Start: 2020-04-20 | End: 2020-05-19

## 2020-04-20 RX ADMIN — ANASTROZOLE 1 MILLIGRAM(S): 1 TABLET ORAL at 12:14

## 2020-04-20 RX ADMIN — CARVEDILOL PHOSPHATE 12.5 MILLIGRAM(S): 80 CAPSULE, EXTENDED RELEASE ORAL at 06:26

## 2020-04-20 RX ADMIN — APIXABAN 10 MILLIGRAM(S): 2.5 TABLET, FILM COATED ORAL at 06:26

## 2020-04-20 RX ADMIN — POLYETHYLENE GLYCOL 3350 17 GRAM(S): 17 POWDER, FOR SOLUTION ORAL at 12:14

## 2020-04-20 NOTE — PROGRESS NOTE ADULT - PROBLEM SELECTOR PLAN 3
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 Moderate left hip osteoarthritis.  Ortho consulted-not a surgical candidate for right hip replacement at this time  Maintain fall precaution   PT rec home PT
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 Moderate left hip osteoarthritis.  Ortho consulted-not a surgical candidate for right hip replacement at this time-consult note pending  Maintain fall precaution   PT consulted

## 2020-04-20 NOTE — PROGRESS NOTE ADULT - SUBJECTIVE AND OBJECTIVE BOX
CARDIOLOGY     PROGRESS  NOTE   ________________________________________________    CHIEF COMPLAINT:Patient is a 88y old  Female who presents with a chief complaint of fall (20 Apr 2020 08:05)  no complain.  	  REVIEW OF SYSTEMS:  CONSTITUTIONAL: No fever, weight loss, or fatigue  EYES: No eye pain, visual disturbances, or discharge  ENT:  No difficulty hearing, tinnitus, vertigo; No sinus or throat pain  NECK: No pain or stiffness  RESPIRATORY: No cough, wheezing, chills or hemoptysis; No Shortness of Breath  CARDIOVASCULAR: No chest pain, palpitations, passing out, dizziness, or leg swelling  GASTROINTESTINAL: No abdominal or epigastric pain. No nausea, vomiting, or hematemesis; No diarrhea or constipation. No melena or hematochezia.  GENITOURINARY: No dysuria, frequency, hematuria, or incontinence  NEUROLOGICAL: No headaches, memory loss, loss of strength, numbness, or tremors  SKIN: No itching, burning, rashes, or lesions   LYMPH Nodes: No enlarged glands  ENDOCRINE: No heat or cold intolerance; No hair loss  MUSCULOSKELETAL: No joint pain or swelling; No muscle, back, or extremity pain  PSYCHIATRIC: No depression, anxiety, mood swings, or difficulty sleeping  HEME/LYMPH: No easy bruising, or bleeding gums  ALLERGY AND IMMUNOLOGIC: No hives or eczema	    [ ] All others negative	  [ ] Unable to obtain    PHYSICAL EXAM:  T(C): 37.1 (04-20-20 @ 11:29), Max: 37.6 (04-19-20 @ 20:43)  HR: 83 (04-20-20 @ 11:29) (72 - 95)  BP: 114/76 (04-20-20 @ 11:29) (104/71 - 122/65)  RR: 18 (04-20-20 @ 11:29) (18 - 20)  SpO2: 96% (04-20-20 @ 11:29) (94% - 96%)  Wt(kg): --  I&O's Summary    19 Apr 2020 07:01  -  20 Apr 2020 07:00  --------------------------------------------------------  IN: 0 mL / OUT: 400 mL / NET: -400 mL        Appearance: Normal	  HEENT:   Normal oral mucosa, PERRL, EOMI	  Lymphatic: No lymphadenopathy  Cardiovascular: Normal S1 S2, No JVD, No murmurs,  Respiratory: Lungs clear to auscultation	  Psychiatry: A & O x 3, Mood & affect appropriate  Gastrointestinal:  Soft, Non-tender, + BS	  Skin: No rashes, No ecchymoses, No cyanosis	  Neurologic: Non-focal  Extremities: Normal range of motion, No clubbing, cyanosis , decrease LLE edema  Vascular: Peripheral pulses palpable 2+ bilaterally    MEDICATIONS  (STANDING):  anastrozole 1 milliGRAM(s) Oral daily  apixaban 10 milliGRAM(s) Oral two times a day  carvedilol 12.5 milliGRAM(s) Oral every 12 hours  polyethylene glycol 3350 17 Gram(s) Oral daily  senna 2 Tablet(s) Oral at bedtime  simvastatin 40 milliGRAM(s) Oral at bedtime      TELEMETRY: 	    ECG:  	  RADIOLOGY:  OTHER: 	  	  LABS:	 	    CARDIAC MARKERS:                                11.1   7.84  )-----------( 315      ( 19 Apr 2020 06:13 )             34.0     04-19    136  |  102  |  20<H>  ----------------------------<  123<H>  4.4   |  29  |  0.88    Ca    8.8      19 Apr 2020 06:13      proBNP: Serum Pro-Brain Natriuretic Peptide: 409 pg/mL (04-13 @ 13:57)    Lipid Profile: Cholesterol 245    HDL 57      HgA1c: Hemoglobin A1C, Whole Blood: 6.4 % (04-14 @ 09:00)    TSH: Thyroid Stimulating Hormone, Serum: 1.78 uU/mL (04-14 @ 06:45)  Thyroid Stimulating Hormone, Serum: 1.60 uU/mL (04-13 @ 13:57)          Assessment and plan  ---------------------------  87 yo F from home, with pmh of blindness,  HTN, pre diabetes,  breast cancer, arthritis presented by EMS with c/o generalized weakness. Pt states she was walking to bathroom and fell due to weakness. Patient reports falling on her right side, denies hitting head. Also, reports severe left leg swelling and pain for  past 3 days. Denies pain, fever, SOB,other acute complaints.  Spoke to family member over phone,[phone],  who informed me that patient is mostly wheel chair bound, minimally ambulates with walker.   in ER pt with rapid a.fib and +for DVT, a.fib rate is better controlled  will add digoxin if increase hr  increase beta blocker for rate control  may start on NOAC for DVT need to change Eliquis dose  to 5 mg bid after 7 days  echo noted  tsh  lipid panel noted on med  DC lasix  dc ivf  encourage po fluid  ckd has resolved  no surgery now unless is urgent/ emergency

## 2020-04-20 NOTE — PROGRESS NOTE ADULT - PROBLEM SELECTOR PLAN 2
Continue eliquis  C/w pain management
Decreased Lovenox as recommended to Lovenox 80mg BID-continue  C/w pain management

## 2020-04-20 NOTE — PROGRESS NOTE ADULT - PROBLEM SELECTOR PLAN 7
Holding home med-Lasix 40 mg   C/w Carvedilol  Monitor BP
Holding home med-Lasix 40 mg   C/w Carvedilol  Monitor BP

## 2020-04-20 NOTE — PROGRESS NOTE ADULT - SUBJECTIVE AND OBJECTIVE BOX
Patient was seen and examined  Patient is a 88y old  Female who presents with a chief complaint of fall (18 Apr 2020 07:14)    No acute events overnight     Vital Signs Last 24 Hrs  T(C): 37.2 (20 Apr 2020 04:49), Max: 37.6 (19 Apr 2020 20:43)  T(F): 99 (20 Apr 2020 04:49), Max: 99.7 (19 Apr 2020 20:43)  HR: 77 (20 Apr 2020 04:49) (72 - 95)  BP: 111/70 (20 Apr 2020 04:49) (101/72 - 120/71)  BP(mean): --  RR: 19 (20 Apr 2020 04:49) (18 - 20)  SpO2: 94% (20 Apr 2020 04:49) (94% - 98%)  18 Apr 2020 07:01  -  19 Apr 2020 07:00  --------------------------------------------------------  IN: 50 mL / OUT: 400 mL / NET: -350 mL        cret                        11.1   7.84  )-----------( 315      ( 19 Apr 2020 06:13 )             34.0     04-19    136  |  102  |  20<H>  ----------------------------<  123<H>  4.4   |  29  |  0.88    Ca    8.8      19 Apr 2020 06:13          CAPILLARY BLOOD GLUCOSE                  MEDICATIONS  (STANDING):  anastrozole 1 milliGRAM(s) Oral daily  apixaban 10 milliGRAM(s) Oral two times a day  carvedilol 12.5 milliGRAM(s) Oral every 12 hours  polyethylene glycol 3350 17 Gram(s) Oral daily  senna 2 Tablet(s) Oral at bedtime  simvastatin 40 milliGRAM(s) Oral at bedtime    MEDICATIONS  (PRN):  acetaminophen   Tablet .. 650 milliGRAM(s) Oral every 6 hours PRN Temp greater or equal to 38C (100.4F), Mild Pain (1 - 3)  traMADol 25 milliGRAM(s) Oral every 6 hours PRN Moderate Pain (4 - 6)  traMADol 50 milliGRAM(s) Oral every 8 hours PRN Severe Pain (7 - 10)      RADIOLOGY & ADDITIONAL TESTS:    Imaging Personally Reviewed:  [ ] YES  [ ] NO    REVIEW OF SYSTEMS:  CONSTITUTIONAL: No fever, weight loss, or fatigue  EYES: No eye pain, visual disturbances, or discharge  ENMT:  No difficulty hearing, tinnitus, vertigo; No sinus or throat pain  NECK: No pain or stiffness  BREASTS: No pain, masses, or nipple discharge  RESPIRATORY: No cough, wheezing, chills or hemoptysis; No shortness of breath  CARDIOVASCULAR: No chest pain, palpitations, dizziness, or leg swelling  GASTROINTESTINAL: No abdominal or epigastric pain. No nausea, vomiting, or hematemesis; No diarrhea or constipation. No melena or hematochezia.  GENITOURINARY: No dysuria, frequency, hematuria, or incontinence  NEUROLOGICAL: No headaches, memory loss, loss of strength, numbness, or tremors  SKIN: No itching, burning, rashes, or lesions   LYMPH NODES: No enlarged glands  ENDOCRINE: No heat or cold intolerance; No hair loss  MUSCULOSKELETAL: No joint pain or swelling; No muscle, back, or extremity pain  PSYCHIATRIC: No depression, anxiety, mood swings, or difficulty sleeping  HEME/LYMPH: No easy bruising, or bleeding gums  ALLERY AND IMMUNOLOGIC: No hives or eczema      Consultant(s) Notes Reviewed:  [ ] YES  [ ] NO    PHYSICAL EXAM:  GENERAL: NAD, well-groomed, well-developed  HEAD:  Atraumatic, Normocephalic  EYES: blind   ENMT: No tonsillar erythema, exudates, or enlargement; Moist mucous membranes, Good dentition, No lesions  NECK: Supple, No JVD, Normal thyroid  NERVOUS SYSTEM:  Alert & Oriented X3, Good concentration; Motor Strength 5/5 B/L upper and lower extremities; DTRs 2+ intact and symmetric  CHEST/LUNG: Clear to percussion bilaterally; No rales, rhonchi, wheezing, or rubs  HEART: Regular rate and rhythm; No murmurs, rubs, or gallops  ABDOMEN: Soft, Nontender, Nondistended; Bowel sounds present  EXTREMITIES: bl knee swelling right more than left  LYMPH: No lymphadenopathy noted  SKIN: No rashes or lesions    Care Discussed with Consultants/Other Providers [ x] YES  [ ] NO

## 2020-07-19 NOTE — PLAN
Pt A/Ox4, forgetful at times. Up in chair w/ assist X1. VSS, afebrile, RA. Denies SOB/pain/n/v/d. All medications given as ordered. Zanubrutinib 160mg BID, given as ordered, witnessed with second RN, Meme. No acute events overnight. Needs attended, call light in reach. Will continue to monitor and assess. Will notify MD of any changes.   [FreeTextEntry1] : O: \par Vasc: DP 1/4 b/l PT non-palpable b/l, CFT<3x10, TG warm to warm\par Derm: wound as described above no malodor , no probe to bone, moderate drainage, slight macerated borders on right medial malleolus, mild serous drainage. fibrogranular wound base\par Neuro: protective sensation intact to the level of the digits \par \par A: Ulcer medial malleolus right ankle, improvement in size\par \par P:\par Pt evaluated and chart created \par Verbal consent was given for debridement\par Wound debrided down to and including subcutaneous tissue with removal of fibrotic tissue with sterile #15 blade to reveal granular wound bed and healthy bleeding, \par Applied santyl and adaptic \par Home dressing changes with santyl, adpatic, 4x4 gauze, kerlix, ACE to be changed every other day.\par Pt and aid gave verbal understanding \par RTC 1 week for re-evaluation. \par

## 2020-09-08 NOTE — ED ADULT NURSE NOTE - NSFALLRSKASSESSTYPE_ED_ALL_ED
No availability with Renita Manuel or Yudelka Vazquez. Where can Pt be scheduled? Initial (On Arrival)

## 2021-07-09 NOTE — PHYSICAL THERAPY INITIAL EVALUATION ADULT - IMPAIRED TRANSFERS: BED/CHAIR, REHAB EVAL
Referral placed for continued use of hospital grade breast pump - note added that this is a continuation of previous referral that is expiring.     Will wait for approval of referral.   decreased strength/impaired balance/impaired postural control

## 2021-11-14 ENCOUNTER — INPATIENT (INPATIENT)
Facility: HOSPITAL | Age: 86
LOS: 3 days | Discharge: HOME | DRG: 202 | End: 2021-11-18
Attending: INTERNAL MEDICINE | Admitting: INTERNAL MEDICINE
Payer: MEDICARE

## 2021-11-14 ENCOUNTER — TRANSCRIPTION ENCOUNTER (OUTPATIENT)
Age: 86
End: 2021-11-14

## 2021-11-14 VITALS
DIASTOLIC BLOOD PRESSURE: 79 MMHG | TEMPERATURE: 98 F | WEIGHT: 272.05 LBS | HEIGHT: 70 IN | SYSTOLIC BLOOD PRESSURE: 125 MMHG | OXYGEN SATURATION: 94 % | HEART RATE: 89 BPM | RESPIRATION RATE: 22 BRPM

## 2021-11-14 DIAGNOSIS — C50.919 MALIGNANT NEOPLASM OF UNSPECIFIED SITE OF UNSPECIFIED FEMALE BREAST: ICD-10-CM

## 2021-11-14 DIAGNOSIS — R06.02 SHORTNESS OF BREATH: ICD-10-CM

## 2021-11-14 DIAGNOSIS — Z29.9 ENCOUNTER FOR PROPHYLACTIC MEASURES, UNSPECIFIED: ICD-10-CM

## 2021-11-14 DIAGNOSIS — I10 ESSENTIAL (PRIMARY) HYPERTENSION: ICD-10-CM

## 2021-11-14 DIAGNOSIS — I21.A1 MYOCARDIAL INFARCTION TYPE 2: ICD-10-CM

## 2021-11-14 DIAGNOSIS — I51.89 OTHER ILL-DEFINED HEART DISEASES: ICD-10-CM

## 2021-11-14 DIAGNOSIS — I82.409 ACUTE EMBOLISM AND THROMBOSIS OF UNSPECIFIED DEEP VEINS OF UNSPECIFIED LOWER EXTREMITY: ICD-10-CM

## 2021-11-14 DIAGNOSIS — I48.91 UNSPECIFIED ATRIAL FIBRILLATION: ICD-10-CM

## 2021-11-14 DIAGNOSIS — R05.8 OTHER SPECIFIED COUGH: ICD-10-CM

## 2021-11-14 DIAGNOSIS — J98.01 ACUTE BRONCHOSPASM: ICD-10-CM

## 2021-11-14 PROBLEM — R73.03 PREDIABETES: Chronic | Status: ACTIVE | Noted: 2020-04-13

## 2021-11-14 LAB
ALBUMIN SERPL ELPH-MCNC: 3.1 G/DL — LOW (ref 3.5–5)
ALP SERPL-CCNC: 153 U/L — HIGH (ref 40–120)
ALT FLD-CCNC: 26 U/L DA — SIGNIFICANT CHANGE UP (ref 10–60)
ANION GAP SERPL CALC-SCNC: 3 MMOL/L — LOW (ref 5–17)
APTT BLD: 42.7 SEC — HIGH (ref 27.5–35.5)
AST SERPL-CCNC: 18 U/L — SIGNIFICANT CHANGE UP (ref 10–40)
BASOPHILS # BLD AUTO: 0.03 K/UL — SIGNIFICANT CHANGE UP (ref 0–0.2)
BASOPHILS NFR BLD AUTO: 0.4 % — SIGNIFICANT CHANGE UP (ref 0–2)
BILIRUB SERPL-MCNC: 0.4 MG/DL — SIGNIFICANT CHANGE UP (ref 0.2–1.2)
BUN SERPL-MCNC: 16 MG/DL — SIGNIFICANT CHANGE UP (ref 7–18)
CALCIUM SERPL-MCNC: 8.8 MG/DL — SIGNIFICANT CHANGE UP (ref 8.4–10.5)
CHLORIDE SERPL-SCNC: 106 MMOL/L — SIGNIFICANT CHANGE UP (ref 96–108)
CO2 SERPL-SCNC: 31 MMOL/L — SIGNIFICANT CHANGE UP (ref 22–31)
CREAT SERPL-MCNC: 0.89 MG/DL — SIGNIFICANT CHANGE UP (ref 0.5–1.3)
EOSINOPHIL # BLD AUTO: 0.9 K/UL — HIGH (ref 0–0.5)
EOSINOPHIL NFR BLD AUTO: 12.9 % — HIGH (ref 0–6)
GLUCOSE SERPL-MCNC: 111 MG/DL — HIGH (ref 70–99)
HCT VFR BLD CALC: 41.2 % — SIGNIFICANT CHANGE UP (ref 34.5–45)
HGB BLD-MCNC: 13.3 G/DL — SIGNIFICANT CHANGE UP (ref 11.5–15.5)
IMM GRANULOCYTES NFR BLD AUTO: 0.3 % — SIGNIFICANT CHANGE UP (ref 0–1.5)
INR BLD: 1.54 RATIO — HIGH (ref 0.88–1.16)
LIDOCAIN IGE QN: 73 U/L — SIGNIFICANT CHANGE UP (ref 73–393)
LYMPHOCYTES # BLD AUTO: 1.57 K/UL — SIGNIFICANT CHANGE UP (ref 1–3.3)
LYMPHOCYTES # BLD AUTO: 22.6 % — SIGNIFICANT CHANGE UP (ref 13–44)
MAGNESIUM SERPL-MCNC: 1.9 MG/DL — SIGNIFICANT CHANGE UP (ref 1.6–2.6)
MCHC RBC-ENTMCNC: 30.6 PG — SIGNIFICANT CHANGE UP (ref 27–34)
MCHC RBC-ENTMCNC: 32.3 GM/DL — SIGNIFICANT CHANGE UP (ref 32–36)
MCV RBC AUTO: 94.7 FL — SIGNIFICANT CHANGE UP (ref 80–100)
MONOCYTES # BLD AUTO: 0.46 K/UL — SIGNIFICANT CHANGE UP (ref 0–0.9)
MONOCYTES NFR BLD AUTO: 6.6 % — SIGNIFICANT CHANGE UP (ref 2–14)
NEUTROPHILS # BLD AUTO: 3.98 K/UL — SIGNIFICANT CHANGE UP (ref 1.8–7.4)
NEUTROPHILS NFR BLD AUTO: 57.2 % — SIGNIFICANT CHANGE UP (ref 43–77)
NRBC # BLD: 0 /100 WBCS — SIGNIFICANT CHANGE UP (ref 0–0)
NT-PROBNP SERPL-SCNC: 750 PG/ML — HIGH (ref 0–450)
PLATELET # BLD AUTO: 264 K/UL — SIGNIFICANT CHANGE UP (ref 150–400)
POTASSIUM SERPL-MCNC: 3.7 MMOL/L — SIGNIFICANT CHANGE UP (ref 3.5–5.3)
POTASSIUM SERPL-SCNC: 3.7 MMOL/L — SIGNIFICANT CHANGE UP (ref 3.5–5.3)
PROT SERPL-MCNC: 7.8 G/DL — SIGNIFICANT CHANGE UP (ref 6–8.3)
PROTHROM AB SERPL-ACNC: 18 SEC — HIGH (ref 10.6–13.6)
RBC # BLD: 4.35 M/UL — SIGNIFICANT CHANGE UP (ref 3.8–5.2)
RBC # FLD: 12.5 % — SIGNIFICANT CHANGE UP (ref 10.3–14.5)
SARS-COV-2 RNA SPEC QL NAA+PROBE: SIGNIFICANT CHANGE UP
SODIUM SERPL-SCNC: 140 MMOL/L — SIGNIFICANT CHANGE UP (ref 135–145)
TROPONIN I, HIGH SENSITIVITY RESULT: 190.3 NG/L — HIGH
TROPONIN I, HIGH SENSITIVITY RESULT: 203.4 NG/L — HIGH
WBC # BLD: 6.96 K/UL — SIGNIFICANT CHANGE UP (ref 3.8–10.5)
WBC # FLD AUTO: 6.96 K/UL — SIGNIFICANT CHANGE UP (ref 3.8–10.5)

## 2021-11-14 PROCEDURE — 93010 ELECTROCARDIOGRAM REPORT: CPT | Mod: 76

## 2021-11-14 PROCEDURE — 71250 CT THORAX DX C-: CPT | Mod: 26,MA

## 2021-11-14 PROCEDURE — 71045 X-RAY EXAM CHEST 1 VIEW: CPT | Mod: 26

## 2021-11-14 PROCEDURE — 99285 EMERGENCY DEPT VISIT HI MDM: CPT

## 2021-11-14 RX ORDER — FUROSEMIDE 40 MG
40 TABLET ORAL DAILY
Refills: 0 | Status: ACTIVE | OUTPATIENT
Start: 2021-11-14 | End: 2022-10-13

## 2021-11-14 RX ORDER — CEFTRIAXONE 500 MG/1
1000 INJECTION, POWDER, FOR SOLUTION INTRAMUSCULAR; INTRAVENOUS EVERY 24 HOURS
Refills: 0 | Status: DISCONTINUED | OUTPATIENT
Start: 2021-11-14 | End: 2021-11-17

## 2021-11-14 RX ORDER — AZITHROMYCIN 500 MG/1
500 TABLET, FILM COATED ORAL EVERY 24 HOURS
Refills: 0 | Status: DISCONTINUED | OUTPATIENT
Start: 2021-11-15 | End: 2021-11-17

## 2021-11-14 RX ORDER — IPRATROPIUM/ALBUTEROL SULFATE 18-103MCG
3 AEROSOL WITH ADAPTER (GRAM) INHALATION ONCE
Refills: 0 | Status: COMPLETED | OUTPATIENT
Start: 2021-11-14 | End: 2021-11-14

## 2021-11-14 RX ORDER — IPRATROPIUM/ALBUTEROL SULFATE 18-103MCG
3 AEROSOL WITH ADAPTER (GRAM) INHALATION EVERY 6 HOURS
Refills: 0 | Status: ACTIVE | OUTPATIENT
Start: 2021-11-14 | End: 2022-10-13

## 2021-11-14 RX ORDER — ANASTROZOLE 1 MG/1
1 TABLET ORAL DAILY
Refills: 0 | Status: ACTIVE | OUTPATIENT
Start: 2021-11-14 | End: 2022-10-13

## 2021-11-14 RX ORDER — AZITHROMYCIN 500 MG/1
TABLET, FILM COATED ORAL
Refills: 0 | Status: DISCONTINUED | OUTPATIENT
Start: 2021-11-14 | End: 2021-11-17

## 2021-11-14 RX ORDER — SIMVASTATIN 20 MG/1
20 TABLET, FILM COATED ORAL AT BEDTIME
Refills: 0 | Status: ACTIVE | OUTPATIENT
Start: 2021-11-14 | End: 2022-10-13

## 2021-11-14 RX ORDER — AZITHROMYCIN 500 MG/1
500 TABLET, FILM COATED ORAL ONCE
Refills: 0 | Status: COMPLETED | OUTPATIENT
Start: 2021-11-14 | End: 2021-11-14

## 2021-11-14 RX ORDER — CARVEDILOL PHOSPHATE 80 MG/1
6.25 CAPSULE, EXTENDED RELEASE ORAL EVERY 12 HOURS
Refills: 0 | Status: ACTIVE | OUTPATIENT
Start: 2021-11-14 | End: 2022-10-13

## 2021-11-14 RX ORDER — APIXABAN 2.5 MG/1
5 TABLET, FILM COATED ORAL EVERY 12 HOURS
Refills: 0 | Status: ACTIVE | OUTPATIENT
Start: 2021-11-15 | End: 2022-02-12

## 2021-11-14 RX ADMIN — SIMVASTATIN 20 MILLIGRAM(S): 20 TABLET, FILM COATED ORAL at 22:55

## 2021-11-14 RX ADMIN — CEFTRIAXONE 100 MILLIGRAM(S): 500 INJECTION, POWDER, FOR SOLUTION INTRAMUSCULAR; INTRAVENOUS at 22:55

## 2021-11-14 RX ADMIN — AZITHROMYCIN 255 MILLIGRAM(S): 500 TABLET, FILM COATED ORAL at 22:55

## 2021-11-14 RX ADMIN — Medication 125 MILLIGRAM(S): at 16:35

## 2021-11-14 RX ADMIN — Medication 3 MILLILITER(S): at 16:35

## 2021-11-14 NOTE — H&P ADULT - NSHPPHYSICALEXAM_GEN_ALL_CORE
Vital Signs Last 24 Hrs  T(C): 36.8 (14 Nov 2021 15:18), Max: 36.8 (14 Nov 2021 15:18)  T(F): 98.3 (14 Nov 2021 15:18), Max: 98.3 (14 Nov 2021 15:18)  HR: 89 (14 Nov 2021 15:18) (89 - 89)  BP: 125/79 (14 Nov 2021 15:18) (125/79 - 125/79)  BP(mean): --  RR: 22 (14 Nov 2021 15:18) (22 - 22)  SpO2: 94% (14 Nov 2021 15:18) (94% - 94%)    GENERAL: NAD, appears comfortable   EYES: EOMI, PERRLA,   NECK: Supple, No JVD  CHEST/LUNG: Clear to auscultation b/l  No rales, rhonchi, mild wheezing +ve   HEART: Irregular rate and rhythm; No murmurs, +ve S1 S2   ABDOMEN: Soft, Nontender, Nondistended; Bowel sounds present  NERVOUS SYSTEM:  Alert & Oriented X3, normal sensations and normal strength     EXTREMITIES:   No clubbing, mild edema noted on RLE> LLE (at baseline )  LYMPH NODES : non palpable

## 2021-11-14 NOTE — H&P ADULT - HISTORY OF PRESENT ILLNESS
89 yo F from home, lives with granddaughter, has HHA, legally blind , walks with cane , ambulation limited due to blindness  89 yo F from home, lives with granddaughter, has HHA, legally blind , walks with cane , ambulation limited due to blindness and OA, Pmhx of breast cancer, Afib, HFpEF, HTN, DVT on Eliquis came with cough and wheezing . Pt stated that she started having wheezes 1 week ago due to cold associated with productive cough (white color mucus) and mild shortness of breadth.  She denied any runny nose, hemoptysis, smoking, seasonal allergies, post nasal dripping, sore throat, fevers, chest pain, N/V/D.     In ED: Pt got 1 dose of solumedrol and albuterol which improved her symptoms   ECHO in 2020 showed GIIDD 89 yo F from home, lives with granddaughter, has HHA, legally blind , walks with cane , ambulation limited due to blindness and OA, Pmhx of breast cancer, Afib, HFpEF, HTN, DVT on Eliquis came with cough and wheezing . Pt stated that she started having wheezes 1 week ago due to cold associated with productive cough (white color mucus) and mild shortness of breadth at rest.  She denied any runny nose, hemoptysis, smoking, seasonal allergies, post nasal dripping, sore throat, fevers, chest pain, N/V/D.     In ED: Pt got 1 dose of solumedrol and albuterol which improved her symptoms   ECHO in 2020 showed GIIDD

## 2021-11-14 NOTE — GOALS OF CARE CONVERSATION - ADVANCED CARE PLANNING - CONVERSATION DETAILS
Discussed GOC with patient and grand daughter at bedside. Pt wants everything to be done to keep her alive. If pt looses capacity to take decision granddaughters Jaida and Mirela will be the decision makers contact # [phone], [phone]

## 2021-11-14 NOTE — ED ADULT NURSE NOTE - OBJECTIVE STATEMENT
Patient presents to ED with c/o shortness of breath. as per patient's grandaughter, she was seen at urgent care, told fluid on lungs, sent to ER for evaluation. Patient noted to have bilat lower extr swelling. breathing spontaneously with no distress.

## 2021-11-14 NOTE — H&P ADULT - PROBLEM SELECTOR PLAN 7
IMPROVE VTE score: 3  Will manage with: Eliquis     [x ] Previous VTE                                    3  [ ] Thrombophilia                                  2  [ ] Lower limb paralysis                        2  (unable to hold up >15 seconds)    [ ] Current Cancer (within 6 months)        2   [x] Immobilization > 24 hrs                    1  [ ] ICU/CCU stay > 24 hrs                      1  [x] Age > 60                                         1 On Eliquis   Continue with home med

## 2021-11-14 NOTE — ED PROVIDER NOTE - CLINICAL SUMMARY MEDICAL DECISION MAKING FREE TEXT BOX
Patient is a 89 y/o female c/o cough and wheezing. Symptoms suggestive of bronchospasm. Consideration for ECS. Will repeat labs, administer steroids, and reassess. Patient is a 89 y/o female c/o cough and wheezing. Concern for CHF based on outpatient X-ray. Will evaluate for CHF, pneumonia, ACS, among other causes. Will repeat labs, order chest x-ray, EKG, and reassess.

## 2021-11-14 NOTE — H&P ADULT - PROBLEM SELECTOR PLAN 3
Pt with elevated troponin 190  f/u trop 2 likely demand ischaemia  EKG with no ischemic changes   Monitor on Tele   F/U ECHO

## 2021-11-14 NOTE — CHART NOTE - NSCHARTNOTEFT_GEN_A_CORE
pt was seen and examined  sob and productive cough  dw er attending   full note follow  dw family at bedside

## 2021-11-14 NOTE — H&P ADULT - PROBLEM SELECTOR PLAN 1
Pt came with cough and wheezing   Chest Xray without any pulmonary edema or pneumonia   CT chest showed Left apical 1.5 cm ill-defined opacity is slightly enlarged from prior study and indeterminate. Scattered tracheobronchial secretions   Symptoms likely in the setting of acute bronchitis with bronchospasm but will rule out cardiac issue as pt has shortness of breadth with elevated troponin   Troponin elevated on admission 190 , f/u repeat troponin   In ED pt got albuterol and solumedrol which improved symptoms   Will do symptomatic management, albuterol PRN for wheezing and shortness of breadth , Robitussin for cough   Will obtain ECHO , ECHO in 2020 showed GIIDD  Will continue with PO Lasix (home med), no need for IV Lasix currently   For Left apical 1.5 cm mass on CT chest, further eval out patient   PT eval ordered Pt came with cough and wheezing for 1 week   Chest Xray without any pulmonary edema or consolidation   CT chest showed Left apical 1.5 cm ill-defined opacity is slightly enlarged from prior study and indeterminate. 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

## 2021-11-14 NOTE — H&P ADULT - PROBLEM SELECTOR PLAN 9
IMPROVE VTE score: 3  Will manage with: Eliquis     [x ] Previous VTE                                    3  [ ] Thrombophilia                                  2  [ ] Lower limb paralysis                        2  (unable to hold up >15 seconds)    [ ] Current Cancer (within 6 months)        2   [x] Immobilization > 24 hrs                    1  [ ] ICU/CCU stay > 24 hrs                      1  [x] Age > 60                                         1

## 2021-11-14 NOTE — H&P ADULT - ASSESSMENT
89 yo F from home, lives with granddaughter, has HHA, legally blind , walks with cane , ambulation limited due to blindness and OA, Pmhx of breast cancer, Afib, HFpEF, HTN, DVT on Eliquis came with cough and wheezing . Admitted for further work up

## 2021-11-14 NOTE — ED PROVIDER NOTE - OBJECTIVE STATEMENT
Patient is a 89 y/o female with pertinent PMHx of hypertension, breast cancer, DVT on Eliquis, and legally blind and no pertinent PSHx presents to the ED c/o one week of cough and wheezing and dyspnea on exertion. Patient went to Urgent Care and was informed she may have fluid in her lungs. Also, her ex-ray came back normal. Patient denies fever, vomiting, diarrhea, neither a history of CHF or lung disease, and all other acute complaints. NKDA.

## 2021-11-15 DIAGNOSIS — Z02.9 ENCOUNTER FOR ADMINISTRATIVE EXAMINATIONS, UNSPECIFIED: ICD-10-CM

## 2021-11-15 DIAGNOSIS — Z29.9 ENCOUNTER FOR PROPHYLACTIC MEASURES, UNSPECIFIED: ICD-10-CM

## 2021-11-15 LAB
A1C WITH ESTIMATED AVERAGE GLUCOSE RESULT: 6.5 % — HIGH (ref 4–5.6)
ALBUMIN SERPL ELPH-MCNC: 3.2 G/DL — LOW (ref 3.5–5)
ALP SERPL-CCNC: 160 U/L — HIGH (ref 40–120)
ALT FLD-CCNC: 25 U/L DA — SIGNIFICANT CHANGE UP (ref 10–60)
ANION GAP SERPL CALC-SCNC: 4 MMOL/L — LOW (ref 5–17)
AST SERPL-CCNC: 12 U/L — SIGNIFICANT CHANGE UP (ref 10–40)
BILIRUB SERPL-MCNC: 0.5 MG/DL — SIGNIFICANT CHANGE UP (ref 0.2–1.2)
BUN SERPL-MCNC: 15 MG/DL — SIGNIFICANT CHANGE UP (ref 7–18)
CALCIUM SERPL-MCNC: 8.9 MG/DL — SIGNIFICANT CHANGE UP (ref 8.4–10.5)
CHLORIDE SERPL-SCNC: 104 MMOL/L — SIGNIFICANT CHANGE UP (ref 96–108)
CO2 SERPL-SCNC: 30 MMOL/L — SIGNIFICANT CHANGE UP (ref 22–31)
COVID-19 NUCLEOCAPSID GAM AB INTERP: POSITIVE
COVID-19 NUCLEOCAPSID TOTAL GAM ANTIBODY RESULT: 249 INDEX — HIGH
COVID-19 SPIKE DOMAIN AB INTERP: POSITIVE
COVID-19 SPIKE DOMAIN ANTIBODY RESULT: >250 U/ML — HIGH
CREAT SERPL-MCNC: 0.93 MG/DL — SIGNIFICANT CHANGE UP (ref 0.5–1.3)
ESTIMATED AVERAGE GLUCOSE: 140 MG/DL — HIGH (ref 68–114)
GLUCOSE BLDC GLUCOMTR-MCNC: 105 MG/DL — HIGH (ref 70–99)
GLUCOSE BLDC GLUCOMTR-MCNC: 237 MG/DL — HIGH (ref 70–99)
GLUCOSE BLDC GLUCOMTR-MCNC: 258 MG/DL — HIGH (ref 70–99)
GLUCOSE SERPL-MCNC: 170 MG/DL — HIGH (ref 70–99)
HCT VFR BLD CALC: 43 % — SIGNIFICANT CHANGE UP (ref 34.5–45)
HGB BLD-MCNC: 13.6 G/DL — SIGNIFICANT CHANGE UP (ref 11.5–15.5)
MAGNESIUM SERPL-MCNC: 2.1 MG/DL — SIGNIFICANT CHANGE UP (ref 1.6–2.6)
MCHC RBC-ENTMCNC: 30 PG — SIGNIFICANT CHANGE UP (ref 27–34)
MCHC RBC-ENTMCNC: 31.6 GM/DL — LOW (ref 32–36)
MCV RBC AUTO: 94.7 FL — SIGNIFICANT CHANGE UP (ref 80–100)
NRBC # BLD: 0 /100 WBCS — SIGNIFICANT CHANGE UP (ref 0–0)
PHOSPHATE SERPL-MCNC: 2.8 MG/DL — SIGNIFICANT CHANGE UP (ref 2.5–4.5)
PLATELET # BLD AUTO: 260 K/UL — SIGNIFICANT CHANGE UP (ref 150–400)
POTASSIUM SERPL-MCNC: 4.2 MMOL/L — SIGNIFICANT CHANGE UP (ref 3.5–5.3)
POTASSIUM SERPL-SCNC: 4.2 MMOL/L — SIGNIFICANT CHANGE UP (ref 3.5–5.3)
PROT SERPL-MCNC: 8.1 G/DL — SIGNIFICANT CHANGE UP (ref 6–8.3)
RAPID RVP RESULT: SIGNIFICANT CHANGE UP
RBC # BLD: 4.54 M/UL — SIGNIFICANT CHANGE UP (ref 3.8–5.2)
RBC # FLD: 12.3 % — SIGNIFICANT CHANGE UP (ref 10.3–14.5)
SARS-COV-2 IGG+IGM SERPL QL IA: 249 INDEX — HIGH
SARS-COV-2 IGG+IGM SERPL QL IA: >250 U/ML — HIGH
SARS-COV-2 IGG+IGM SERPL QL IA: POSITIVE
SARS-COV-2 IGG+IGM SERPL QL IA: POSITIVE
SARS-COV-2 RNA SPEC QL NAA+PROBE: SIGNIFICANT CHANGE UP
SODIUM SERPL-SCNC: 138 MMOL/L — SIGNIFICANT CHANGE UP (ref 135–145)
TROPONIN I, HIGH SENSITIVITY RESULT: 196.2 NG/L — HIGH
WBC # BLD: 6.53 K/UL — SIGNIFICANT CHANGE UP (ref 3.8–10.5)
WBC # FLD AUTO: 6.53 K/UL — SIGNIFICANT CHANGE UP (ref 3.8–10.5)

## 2021-11-15 PROCEDURE — 93306 TTE W/DOPPLER COMPLETE: CPT | Mod: 26

## 2021-11-15 RX ORDER — INSULIN LISPRO 100/ML
VIAL (ML) SUBCUTANEOUS
Refills: 0 | Status: ACTIVE | OUTPATIENT
Start: 2021-11-15 | End: 2022-10-14

## 2021-11-15 RX ORDER — INFLUENZA VIRUS VACCINE 15; 15; 15; 15 UG/.5ML; UG/.5ML; UG/.5ML; UG/.5ML
0.7 SUSPENSION INTRAMUSCULAR ONCE
Refills: 0 | Status: COMPLETED | OUTPATIENT
Start: 2021-11-15 | End: 2021-11-18

## 2021-11-15 RX ORDER — DEXTROSE 50 % IN WATER 50 %
25 SYRINGE (ML) INTRAVENOUS ONCE
Refills: 0 | Status: ACTIVE | OUTPATIENT
Start: 2021-11-15

## 2021-11-15 RX ORDER — GLUCAGON INJECTION, SOLUTION 0.5 MG/.1ML
1 INJECTION, SOLUTION SUBCUTANEOUS ONCE
Refills: 0 | Status: ACTIVE | OUTPATIENT
Start: 2021-11-15 | End: 2022-10-14

## 2021-11-15 RX ORDER — ACETAMINOPHEN 500 MG
650 TABLET ORAL EVERY 6 HOURS
Refills: 0 | Status: ACTIVE | OUTPATIENT
Start: 2021-11-15 | End: 2022-10-14

## 2021-11-15 RX ADMIN — Medication 100 MILLIGRAM(S): at 04:19

## 2021-11-15 RX ADMIN — CARVEDILOL PHOSPHATE 6.25 MILLIGRAM(S): 80 CAPSULE, EXTENDED RELEASE ORAL at 06:52

## 2021-11-15 RX ADMIN — ANASTROZOLE 1 MILLIGRAM(S): 1 TABLET ORAL at 12:03

## 2021-11-15 RX ADMIN — CARVEDILOL PHOSPHATE 6.25 MILLIGRAM(S): 80 CAPSULE, EXTENDED RELEASE ORAL at 17:23

## 2021-11-15 RX ADMIN — Medication 650 MILLIGRAM(S): at 21:04

## 2021-11-15 RX ADMIN — Medication 3: at 22:06

## 2021-11-15 RX ADMIN — CEFTRIAXONE 100 MILLIGRAM(S): 500 INJECTION, POWDER, FOR SOLUTION INTRAMUSCULAR; INTRAVENOUS at 22:08

## 2021-11-15 RX ADMIN — Medication 40 MILLIGRAM(S): at 06:52

## 2021-11-15 RX ADMIN — APIXABAN 5 MILLIGRAM(S): 2.5 TABLET, FILM COATED ORAL at 06:52

## 2021-11-15 RX ADMIN — SIMVASTATIN 20 MILLIGRAM(S): 20 TABLET, FILM COATED ORAL at 21:05

## 2021-11-15 RX ADMIN — APIXABAN 5 MILLIGRAM(S): 2.5 TABLET, FILM COATED ORAL at 17:23

## 2021-11-15 RX ADMIN — Medication 100 MILLIGRAM(S): at 21:06

## 2021-11-15 RX ADMIN — Medication 650 MILLIGRAM(S): at 21:40

## 2021-11-15 RX ADMIN — AZITHROMYCIN 255 MILLIGRAM(S): 500 TABLET, FILM COATED ORAL at 21:05

## 2021-11-15 RX ADMIN — Medication 3 MILLILITER(S): at 04:18

## 2021-11-15 NOTE — CONSULT NOTE ADULT - SUBJECTIVE AND OBJECTIVE BOX
CARDIOLOGY ATTENDING  COVERING Dr Denton     CHIEF COMPLAINT:Patient is a 89y old  Female who presents with a chief complaint of Shortness of breadth.      HPI:  89 yo F from home, lives with granddaughter, has HHA, legally blind , walks with cane , ambulation limited due to blindness and OA, Pmhx of breast cancer, Afib, HFpEF, HTN, DVT on Eliquis came with cough and wheezing . Pt stated that she started having wheezes 1 week ago due to cold associated with productive cough (white color mucus) and mild shortness of breadth at rest.  She denied any runny nose, hemoptysis, smoking, seasonal allergies, post nasal dripping, sore throat, fevers, chest pain, N/V/D.     In ED: Pt got 1 dose of solumedrol and albuterol which improved her symptoms   ECHO in 2020 showed GIIDD (14 Nov 2021 20:46)      PAST MEDICAL & SURGICAL HISTORY:  History of hypertension    Arthritis    Legally blind    Pre-diabetes    Breast cancer  right            MEDICATIONS  (STANDING):  anastrozole 1 milliGRAM(s) Oral daily  apixaban 5 milliGRAM(s) Oral every 12 hours  azithromycin  IVPB 500 milliGRAM(s) IV Intermittent every 24 hours  azithromycin  IVPB      carvedilol 6.25 milliGRAM(s) Oral every 12 hours  cefTRIAXone   IVPB 1000 milliGRAM(s) IV Intermittent every 24 hours  furosemide    Tablet 40 milliGRAM(s) Oral daily  influenza  Vaccine (HIGH DOSE) 0.7 milliLiter(s) IntraMuscular once  simvastatin 20 milliGRAM(s) Oral at bedtime    MEDICATIONS  (PRN):  albuterol/ipratropium for Nebulization 3 milliLiter(s) Nebulizer every 6 hours PRN Shortness of Breath and/or Wheezing  guaiFENesin Oral Liquid (Sugar-Free) 100 milliGRAM(s) Oral every 6 hours PRN Cough      FAMILY HISTORY:  No pertinent family history in first degree relatives        SOCIAL HISTORY:    [x ] Non-smoker    [x ] Alcohol-denies    Allergies    No Known Allergies    Intolerances    	    REVIEW OF SYSTEMS:  CONSTITUTIONAL: No fever, weight loss, or fatigue  EYES: No eye pain, visual disturbances, or discharge  ENT:  No difficulty hearing, tinnitus, vertigo; No sinus or throat pain  NECK: No pain or stiffness  RESPIRATORY: No cough, wheezing, chills or hemoptysis; No Shortness of Breath  CARDIOVASCULAR: No chest pain, palpitations, passing out, dizziness, or leg swelling  GASTROINTESTINAL: No abdominal or epigastric pain. No nausea, vomiting, or hematemesis; No diarrhea or constipation. No melena or hematochezia.  GENITOURINARY: No dysuria, frequency, hematuria, or incontinence  NEUROLOGICAL: No headaches, memory loss, loss of strength, numbness, or tremors  SKIN: No itching, burning, rashes, or lesions   LYMPH Nodes: No enlarged glands  ENDOCRINE: No heat or cold intolerance; No hair loss  MUSCULOSKELETAL: No joint pain or swelling; No muscle, back, or extremity pain  PSYCHIATRIC: No depression, anxiety, mood swings, or difficulty sleeping  HEME/LYMPH: No easy bruising, or bleeding gums  ALLERGY AND IMMUNOLOGIC: No hives or eczema	        PHYSICAL EXAM:  T(C): 36.4 (11-15-21 @ 10:45), Max: 36.8 (11-14-21 @ 15:18)  HR: 78 (11-15-21 @ 10:45) (2 - 89)  BP: 137/83 (11-15-21 @ 10:45) (125/79 - 161/98)  RR: 18 (11-15-21 @ 10:45) (18 - 22)  SpO2: 99% (11-15-21 @ 10:45) (94% - 99%)        Appearance: Normal	  HEENT:   Normal oral mucosa, PERRL, EOMI	  Lymphatic: No lymphadenopathy  Cardiovascular: Normal S1 S2, No JVD, No murmurs, No edema  Respiratory: Lungs clear to auscultation	  Psychiatry: A & O x 3, Mood & affect appropriate  Gastrointestinal:  Soft, Non-tender, + BS	  Skin: No rashes, No ecchymoses, No cyanosis	  Neurologic: Non-focal  Extremities: Normal range of motion, No clubbing, cyanosis or edema  Vascular: Peripheral pulses palpable 2+ bilaterally      LABS:	 	                        13.6   6.53  )-----------( 260      ( 15 Nov 2021 06:18 )             43.0     11-15    138  |  104  |  15  ----------------------------<  170<H>  4.2   |  30  |  0.93    Ca    8.9      15 Nov 2021 06:18  Phos  2.8     11-15  Mg     2.1     11-15    TPro  8.1  /  Alb  3.2<L>  /  TBili  0.5  /  DBili  x   /  AST  12  /  ALT  25  /  AlkPhos  160<H>  11-15    proBNP: Serum Pro-Brain Natriuretic Peptide: 750 pg/mL (11-14 @ 16:33)      EXAM:  CT CHEST                            PROCEDURE DATE:  11/14/2021          INTERPRETATION:  EXAMINATION: CT CHEST    CLINICAL INDICATION: Dyspnea.    TECHNIQUE: Noncontrast CT of the chest was obtained.    COMPARISON: 5/25/2019.    FINDINGS:    AIRWAYS AND LUNGS: Scattered tracheobronchial secretions  . Left apical 1.5 cm ill-defined opacity is slightly enlarged from prior study. Lungs otherwise clear.    MEDIASTINUM AND PLEURA: There are no enlarged mediastinal, hilar or axillary lymph nodes. The visualized portion of the thyroid gland is unremarkable. There is no pleural effusion. There is no pneumothorax.    HEART AND VESSELS: There is cardiomegaly.  There are atherosclerotic calcifications of the aorta and coronary arteries.  Thereis no pericardial effusion.  Aortic valve calcification.    UPPER ABDOMEN: Images of the upper abdomen demonstrate no abnormality.    BONES AND SOFT TISSUES: There are mild degenerative changes of the spine.  The soft tissues are unremarkable.    TUBES/LINES: None.    IMPRESSION:  Left apical 1.5 cm ill-defined opacity is slightly enlarged from prior study and indeterminate.    Scattered tracheobronchial secretions

## 2021-11-15 NOTE — PHYSICAL THERAPY INITIAL EVALUATION ADULT - GENERAL OBSERVATIONS, REHAB EVAL
Pt is R arm precatuions (no BP) and is a falls risk. Pt states has 10/10 pain in RHIp from arthritis. Pt BILAT arms and feet has decreased ROM consistent w/ RA.Pt. received supine in bed, NAD,+tele, cooperative and motivated during eval.  Pt. A&O x 3.

## 2021-11-15 NOTE — PROGRESS NOTE ADULT - PROBLEM SELECTOR PLAN 3
Pt with elevated troponin 190  f/u trop 2 likely demand ischaemia  EKG with no ischemic changes   Monitor on Tele   F/U ECHO stretcher

## 2021-11-15 NOTE — PROGRESS NOTE ADULT - PROBLEM SELECTOR PLAN 2
possible CAP versus bronchitis  c/w albuterol and Robitussin   c/w axithromycin and rocephin  f/u ECHO , ECHO in 2020 showed GIIDD  f/u blood culture  c/w PO Lasix (home med), no need for IV Lasix currently   Pt might benefit with PFT outpatient to find out for COPD  For Left apical 1.5 cm mass on CT chest, further work up outpatient  Pulmonology Dr Tinsley

## 2021-11-15 NOTE — PROGRESS NOTE ADULT - SUBJECTIVE AND OBJECTIVE BOX
Patient was seen and examined  Patient is a 89y old  Female who presents with a chief complaint of Shortness of breadth (15 Nov 2021 09:33)      INTERVAL HPI/OVERNIGHT EVENTS:  T(C): 36.4 (11-15-21 @ 10:45), Max: 36.8 (11-14-21 @ 15:18)  HR: 78 (11-15-21 @ 10:45) (2 - 89)  BP: 137/83 (11-15-21 @ 10:45) (125/79 - 161/98)  RR: 18 (11-15-21 @ 10:45) (18 - 22)  SpO2: 99% (11-15-21 @ 10:45) (94% - 99%)  Wt(kg): --  I&O's Summary      LABS:                        13.6   6.53  )-----------( 260      ( 15 Nov 2021 06:18 )             43.0     11-15    138  |  104  |  15  ----------------------------<  170<H>  4.2   |  30  |  0.93    Ca    8.9      15 Nov 2021 06:18  Phos  2.8     11-15  Mg     2.1     11-15    TPro  8.1  /  Alb  3.2<L>  /  TBili  0.5  /  DBili  x   /  AST  12  /  ALT  25  /  AlkPhos  160<H>  11-15    PT/INR - ( 14 Nov 2021 16:33 )   PT: 18.0 sec;   INR: 1.54 ratio         PTT - ( 14 Nov 2021 16:33 )  PTT:42.7 sec    CAPILLARY BLOOD GLUCOSE      POCT Blood Glucose.: 237 mg/dL (15 Nov 2021 11:43)              MEDICATIONS  (STANDING):  anastrozole 1 milliGRAM(s) Oral daily  apixaban 5 milliGRAM(s) Oral every 12 hours  azithromycin  IVPB 500 milliGRAM(s) IV Intermittent every 24 hours  azithromycin  IVPB      carvedilol 6.25 milliGRAM(s) Oral every 12 hours  cefTRIAXone   IVPB 1000 milliGRAM(s) IV Intermittent every 24 hours  furosemide    Tablet 40 milliGRAM(s) Oral daily  influenza  Vaccine (HIGH DOSE) 0.7 milliLiter(s) IntraMuscular once  simvastatin 20 milliGRAM(s) Oral at bedtime    MEDICATIONS  (PRN):  albuterol/ipratropium for Nebulization 3 milliLiter(s) Nebulizer every 6 hours PRN Shortness of Breath and/or Wheezing  guaiFENesin Oral Liquid (Sugar-Free) 100 milliGRAM(s) Oral every 6 hours PRN Cough      RADIOLOGY & ADDITIONAL TESTS:    Imaging Personally Reviewed:  [ ] YES  [ ] NO    REVIEW OF SYSTEMS:  CONSTITUTIONAL: No fever, weight loss, or fatigue  EYES: No eye pain, visual disturbances, or discharge  ENMT:  No difficulty hearing, tinnitus, vertigo; No sinus or throat pain  NECK: No pain or stiffness  BREASTS: No pain, masses, or nipple discharge  RESPIRATORY: No cough, wheezing, chills or hemoptysis; No shortness of breath  CARDIOVASCULAR: No chest pain, palpitations, dizziness, or leg swelling  GASTROINTESTINAL: No abdominal or epigastric pain. No nausea, vomiting, or hematemesis; No diarrhea or constipation. No melena or hematochezia.  GENITOURINARY: No dysuria, frequency, hematuria, or incontinence  NEUROLOGICAL: No headaches, memory loss, loss of strength, numbness, or tremors  SKIN: No itching, burning, rashes, or lesions   LYMPH NODES: No enlarged glands  ENDOCRINE: No heat or cold intolerance; No hair loss  MUSCULOSKELETAL: No joint pain or swelling; No muscle, back, or extremity pain  PSYCHIATRIC: No depression, anxiety, mood swings, or difficulty sleeping  HEME/LYMPH: No easy bruising, or bleeding gums  ALLERY AND IMMUNOLOGIC: No hives or eczema      Consultant(s) Notes Reviewed:  [ x ] YES  [ ] NO    PHYSICAL EXAM:  GENERAL: NAD, well-groomed, well-developed  HEAD:  Atraumatic, Normocephalic  EYES: EOMI, PERRLA, conjunctiva and sclera clear  ENMT: No tonsillar erythema, exudates, or enlargement; Moist mucous membranes, Good dentition, No lesions  NECK: Supple, No JVD, Normal thyroid  NERVOUS SYSTEM:  Alert & Oriented X3, Good concentration; Motor Strength 5/5 B/L upper and lower extremities; DTRs 2+ intact and symmetric  CHEST/LUNG: Clear to percussion bilaterally; No rales, rhonchi, wheezing, or rubs  HEART: Regular rate and rhythm; No murmurs, rubs, or gallops  ABDOMEN: Soft, Nontender, Nondistended; Bowel sounds present  EXTREMITIES:  2+ Peripheral Pulses, No clubbing, cyanosis, or edema  LYMPH: No lymphadenopathy noted  SKIN: No rashes or lesions    Care Discussed with Consultants/Other Providers [ x] YES  [ ] NO

## 2021-11-15 NOTE — PROGRESS NOTE ADULT - PROBLEM SELECTOR PLAN 10
pending blood culture  PT consult pending  Telemetry monitoring  pending cardiology and pulmonology official recommendations

## 2021-11-15 NOTE — PROGRESS NOTE ADULT - SUBJECTIVE AND OBJECTIVE BOX
NP Note discussed with  Primary Attending    Patient is a 89y old  Female who presents with a chief complaint of Shortness of breadth (14 Nov 2021 20:46)      INTERVAL HPI/OVERNIGHT EVENTS: Patient seen and examined at bedside, no new complaints    MEDICATIONS  (STANDING):  anastrozole 1 milliGRAM(s) Oral daily  apixaban 5 milliGRAM(s) Oral every 12 hours  azithromycin  IVPB 500 milliGRAM(s) IV Intermittent every 24 hours  azithromycin  IVPB      carvedilol 6.25 milliGRAM(s) Oral every 12 hours  cefTRIAXone   IVPB 1000 milliGRAM(s) IV Intermittent every 24 hours  furosemide    Tablet 40 milliGRAM(s) Oral daily  simvastatin 20 milliGRAM(s) Oral at bedtime    MEDICATIONS  (PRN):  albuterol/ipratropium for Nebulization 3 milliLiter(s) Nebulizer every 6 hours PRN Shortness of Breath and/or Wheezing  guaiFENesin Oral Liquid (Sugar-Free) 100 milliGRAM(s) Oral every 6 hours PRN Cough      __________________________________________________  REVIEW OF SYSTEMS:    CONSTITUTIONAL: No fever,   EYES: no acute visual disturbances  NECK: No pain or stiffness  RESPIRATORY: No cough; No shortness of breath  CARDIOVASCULAR: No chest pain, no palpitations  GASTROINTESTINAL: No pain. No nausea or vomiting; No diarrhea   NEUROLOGICAL: No headache or numbness, no tremors  MUSCULOSKELETAL: No joint pain, no muscle pain  GENITOURINARY: no dysuria, no frequency, no hesitancy  PSYCHIATRY: no depression , no anxiety  ALL OTHER  ROS negative        Vital Signs Last 24 Hrs  T(C): 36.7 (15 Nov 2021 08:28), Max: 36.8 (14 Nov 2021 15:18)  T(F): 98 (15 Nov 2021 08:28), Max: 98.3 (14 Nov 2021 15:18)  HR: 2 (15 Nov 2021 08:28) (2 - 89)  BP: 157/96 (15 Nov 2021 08:28) (125/79 - 161/98)  BP(mean): --  RR: 19 (15 Nov 2021 08:28) (18 - 22)  SpO2: 98% (15 Nov 2021 08:28) (94% - 98%)    ________________________________________________  PHYSICAL EXAM:  GENERAL: NAD  HEENT: Normocephalic;  conjunctivae and sclerae clear; moist mucous membranes;   NECK : supple  CHEST/LUNG: expiratory wheezing   HEART: S1 S2  regular; no murmurs, gallops or rubs  ABDOMEN: obese Soft, Nontender, Nondistended; Bowel sounds present  EXTREMITIES: no cyanosis; no edema; no calf tenderness  SKIN: warm and dry; no rash  NERVOUS SYSTEM:  Awake and alert; Oriented  to place, person and time ; no new deficits    _________________________________________________  LABS:                        13.6   6.53  )-----------( 260      ( 15 Nov 2021 06:18 )             43.0     11-15    138  |  104  |  15  ----------------------------<  170<H>  4.2   |  30  |  0.93    Ca    8.9      15 Nov 2021 06:18  Phos  2.8     11-15  Mg     2.1     11-15    TPro  8.1  /  Alb  3.2<L>  /  TBili  0.5  /  DBili  x   /  AST  12  /  ALT  25  /  AlkPhos  160<H>  11-15    PT/INR - ( 14 Nov 2021 16:33 )   PT: 18.0 sec;   INR: 1.54 ratio         PTT - ( 14 Nov 2021 16:33 )  PTT:42.7 sec    CAPILLARY BLOOD GLUCOSE            RADIOLOGY & ADDITIONAL TESTS:  < from: CT Chest No Cont (11.14.21 @ 17:05) >    EXAM:  CT CHEST                            PROCEDURE DATE:  11/14/2021          INTERPRETATION:  EXAMINATION: CT CHEST    CLINICAL INDICATION: Dyspnea.    TECHNIQUE: Noncontrast CT of the chest was obtained.    COMPARISON: 5/25/2019.    FINDINGS:    AIRWAYS AND LUNGS: Scattered tracheobronchial secretions  . Left apical 1.5 cm ill-defined opacity is slightly enlarged from prior study. Lungs otherwise clear.    MEDIASTINUM AND PLEURA: There are no enlarged mediastinal, hilar or axillary lymph nodes. The visualized portion of the thyroid gland is unremarkable. There is no pleural effusion. There is no pneumothorax.    HEART AND VESSELS: There is cardiomegaly.  There are atherosclerotic calcifications of the aorta and coronary arteries.  Thereis no pericardial effusion.  Aortic valve calcification.    UPPER ABDOMEN: Images of the upper abdomen demonstrate no abnormality.    BONES AND SOFT TISSUES: There are mild degenerative changes of the spine.  The soft tissues are unremarkable.    TUBES/LINES: None.    IMPRESSION:  Left apical 1.5 cm ill-defined opacity is slightly enlarged from prior study and indeterminate.    Scattered tracheobronchial secretions    < end of copied text >  < from: Xray Chest 1 View AP/PA (11.14.21 @ 16:47) >    EXAM:  XR CHEST AP OR PA 1V                            PROCEDURE DATE:  11/14/2021          INTERPRETATION:  XR CHEST    Single AP view    HISTORY:  Chest Pain    Comparison: Same day chest x-ray    The cardiac silhouette is enlarged. The lungs areclear. No pleural abnormality. Right diaphragmatic eventration again noted.    IMPRESSION: No acute disease.    < end of copied text >    Imaging  Reviewed:  YES    Consultant(s) Notes Reviewed:   YES      Plan of care was discussed with patient and /or primary care giver; all questions and concerns were addressed

## 2021-11-15 NOTE — PHYSICAL THERAPY INITIAL EVALUATION ADULT - SOCIAL CONCERNS
Pt lives in an apartment on the 3rd floor that is wheelchair accessible and lives w/ her granddaughter and daughter. Pt has a HHA that helps her 5hrs a day for 5 days a week./None

## 2021-11-15 NOTE — PHYSICAL THERAPY INITIAL EVALUATION ADULT - IMPAIRMENTS FOUND, PT EVAL
aerobic capacity/endurance/gait, locomotion, and balance/poor safety awareness/posture/ROM/visual motor

## 2021-11-15 NOTE — PHYSICAL THERAPY INITIAL EVALUATION ADULT - PERTINENT HX OF CURRENT PROBLEM, REHAB EVAL
Pt has a PMH of breast cancer, Afib, HFpEF, arthritis in R hip, HTN,SOB, DVT on Eliquis and is legally blind.Pt. admitted for acute bronchospasm.

## 2021-11-15 NOTE — PROGRESS NOTE ADULT - PROBLEM SELECTOR PLAN 1
Pt with elevated troponin 190  f/u trop 2 likely demand ischaemia associated with possible CAP  EKG with no ischemic changes   Monitor on Tele   troponin x3 positive- trending down  F/U ECHO  Cardio Dr Denton

## 2021-11-15 NOTE — PROGRESS NOTE ADULT - PROBLEM SELECTOR PLAN 1
Pt came with cough and wheezing for 1 week   Chest Xray without any pulmonary edema or consolidation   CT chest showed Left apical 1.5 cm ill-defined opacity is slightly enlarged from prior study and indeterminate. 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

## 2021-11-15 NOTE — PHYSICAL THERAPY INITIAL EVALUATION ADULT - DIAGNOSIS, PT EVAL
Overall decline in functional mobility due to R Hip arthritic pain, weakness, decreased balance, decreased vision, decreased postural stability, ROM and endurance.

## 2021-11-15 NOTE — CONSULT NOTE ADULT - ASSESSMENT
89 yo F from home, lives with granddaughter, has HHA, legally blind , walks with cane , ambulation limited due to blindness and OA, Pmhx of breast cancer, Afib, HFpEF, HTN, DVT on Eliquis came with cough and wheezing due to acute bronchitis.  1.D/C tele.  2.Echocardiogram.  3.Acute bronchitis-ABX.  4.Breast ca-anastrozole.  5.Lung lesion-Pulm eval.  6.DVT and afib-eliquis,  7.Sleep study as outpatient-R/O MARGARITA.  8.GI prophylaxis.

## 2021-11-15 NOTE — CONSULT NOTE ADULT - SUBJECTIVE AND OBJECTIVE BOX
Time of visit:    CHIEF COMPLAINT: Patient is a 89y old  Female who presents with a chief complaint of Shortness of breadth (15 Nov 2021 12:28)      HPI:  89 yo F from home, lives with granddaughter, has HHA, legally blind , walks with cane , ambulation limited due to blindness and OA, Pmhx of breast cancer, Afib, HFpEF, HTN, DVT on Eliquis came with cough and wheezing . Pt stated that she started having wheezes 1 week ago due to cold associated with productive cough (white color mucus) and mild shortness of breadth at rest.  She denied any runny nose, hemoptysis, smoking, seasonal allergies, post nasal dripping, sore throat, fevers, chest pain, N/V/D.     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     PAST MEDICAL & SURGICAL HISTORY:  History of hypertension    Arthritis    Legally blind    Pre-diabetes    Breast cancer  right    No significant past surgical history        Allergies    No Known Allergies    Intolerances        MEDICATIONS  (STANDING):  anastrozole 1 milliGRAM(s) Oral daily  apixaban 5 milliGRAM(s) Oral every 12 hours  azithromycin  IVPB 500 milliGRAM(s) IV Intermittent every 24 hours  azithromycin  IVPB      carvedilol 6.25 milliGRAM(s) Oral every 12 hours  cefTRIAXone   IVPB 1000 milliGRAM(s) IV Intermittent every 24 hours  furosemide    Tablet 40 milliGRAM(s) Oral daily  influenza  Vaccine (HIGH DOSE) 0.7 milliLiter(s) IntraMuscular once  simvastatin 20 milliGRAM(s) Oral at bedtime      MEDICATIONS  (PRN):  albuterol/ipratropium for Nebulization 3 milliLiter(s) Nebulizer every 6 hours PRN Shortness of Breath and/or Wheezing  guaiFENesin Oral Liquid (Sugar-Free) 100 milliGRAM(s) Oral every 6 hours PRN Cough   Medications up to date at time of exam.    Medications up to date at time of exam.    FAMILY HISTORY:  No pertinent family history in first degree relatives        SOCIAL HISTORY  Smoking History: Denies smoking/smoke exposure.   Living Condition: [   ] apartment, [   ] private house  Work History:   Travel History: denies recent travel  Illicit Substance Use: denies  Alcohol Use: denies    REVIEW OF SYSTEMS:    CONSTITUTIONAL:  No staff report of  fevers, chills. Denies Night sweats.     HEENT:  Denies blurred vision, sore throat or runny nose.    CARDIOVASCULAR:  Denies chest discomfort . No palpitations.    RESPIRATORY:  Has SOB on exertion and wheezing.      GASTROINTESTINAL:  Denies abdominal pain. No nausea, vomiting or diarrhea on exam .    GENITOURINARY: No hematuria.     NEUROLOGIC:  No seizures or weakness.    PSYCHIATRIC:  No emotional distress onexam.     PHYSICAL EXAMINATION:    GENERAL: Alert and oriented. Able to answer question with no SOB. Morbid Obese. No acute distress.     Vital Signs Last 24 Hrs  T(C): 36.4 (15 Nov 2021 10:45), Max: 36.8 (14 Nov 2021 20:40)  T(F): 97.6 (15 Nov 2021 10:45), Max: 98.3 (14 Nov 2021 20:40)  HR: 88 (15 Nov 2021 15:10) (2 - 88)  BP: 162/118 (15 Nov 2021 15:10) (125/79 - 175/135)  BP(mean): --  RR: 18 (15 Nov 2021 10:45) (18 - 20)  SpO2: 98% (15 Nov 2021 15:10) (96% - 100%)   (if applicable)      HEENT: Head is normocephalic and atraumatic. No nasal tenderness. Legally blind. Moist mucosa.      NECK: Supple, no palpable adenopathy.    LUNGS: Fair air entrance . Has bilateral expiratory wheeze.     HEART: S1 S2 Regular rate and click/ rub.     ABDOMEN: Soft, nontender, and nondistended. Active bowel sounds.     EXTREMITIES: Without any cyanosis, clubbing, rash, lesions or edema.    NEUROLOGIC: Awake, alert, oriented.     SKIN: Warm, dry, good turgor.      LABS:                        13.6   6.53  )-----------( 260      ( 15 Nov 2021 06:18 )             43.0     11-15    138  |  104  |  15  ----------------------------<  170<H>  4.2   |  30  |  0.93    Ca    8.9      15 Nov 2021 06:18  Phos  2.8     11-15  Mg     2.1     11-15    TPro  8.1  /  Alb  3.2<L>  /  TBili  0.5  /  DBili  x   /  AST  12  /  ALT  25  /  AlkPhos  160<H>  11-15    PT/INR - ( 14 Nov 2021 16:33 )   PT: 18.0 sec;   INR: 1.54 ratio         PTT - ( 14 Nov 2021 16:33 )  PTT:42.7 sec            Serum Pro-Brain Natriuretic Peptide: 750 pg/mL (11-14-21 @ 16:33)          MICROBIOLOGY: (if applicable)    RADIOLOGY & ADDITIONAL STUDIES:  EKG:   CXR:  ECHO:    IMPRESSION: 89y Female PAST MEDICAL & SURGICAL HISTORY:  History of hypertension    Arthritis    Legally blind    Pre-diabetes    Breast cancer  right    No significant past surgical history      89 Y/O Female presented to ED with productive cough with white sputum and  wheezing  x 1 week . Mild shortness of breadth , cough and SOB on exertion with Left apical 1.5 cm ill-defined opacity . Scattered tracheobronchial secretions . 11-12-21 Negative Covid 19 PCR .     Suggestion:  O2 saturation 98% with O2 supplement. Continue Oxygen supplementation 2L NC to prevent worsening of SOB. Monitor O2 saturation trend and degree of SOB , will follow up if she needs O2 supplement outpatient .      Please give  Duoneb via nebulization Q 6 Hours RTC instead of PRN due to persistent wheezing .    Solumedrol 40 mg IVP Q 12 Hours x 2 Days then Prednisone 40 mg oral daily x 5 Days.   On Ceftriaxone 1 Gm IVPB daily. Azithromycin 500 mg IVPB Daily.   DVT/ GI prophylactic.  Outpatient PFT .   Time of visit:    CHIEF COMPLAINT: Patient is a 89y old  Female who presents with a chief complaint of Shortness of breadth (15 Nov 2021 12:28)      HPI:  89 yo F from home, lives with granddaughter, has HHA, legally blind , walks with cane , ambulation limited due to blindness and OA, Pmhx of breast cancer, Afib, HFpEF, HTN, DVT on Eliquis came with cough and wheezing . Pt stated that she started having wheezes 1 week ago due to cold associated with productive cough (white color mucus) and mild shortness of breadth at rest.  She denied any runny nose, hemoptysis, smoking, seasonal allergies, post nasal dripping, sore throat, fevers, chest pain, N/V/D.     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     PAST MEDICAL & SURGICAL HISTORY:  History of hypertension    Arthritis    Legally blind    Pre-diabetes    Breast cancer  right    No significant past surgical history        Allergies    No Known Allergies    Intolerances        MEDICATIONS  (STANDING):  anastrozole 1 milliGRAM(s) Oral daily  apixaban 5 milliGRAM(s) Oral every 12 hours  azithromycin  IVPB 500 milliGRAM(s) IV Intermittent every 24 hours  azithromycin  IVPB      carvedilol 6.25 milliGRAM(s) Oral every 12 hours  cefTRIAXone   IVPB 1000 milliGRAM(s) IV Intermittent every 24 hours  furosemide    Tablet 40 milliGRAM(s) Oral daily  influenza  Vaccine (HIGH DOSE) 0.7 milliLiter(s) IntraMuscular once  simvastatin 20 milliGRAM(s) Oral at bedtime      MEDICATIONS  (PRN):  albuterol/ipratropium for Nebulization 3 milliLiter(s) Nebulizer every 6 hours PRN Shortness of Breath and/or Wheezing  guaiFENesin Oral Liquid (Sugar-Free) 100 milliGRAM(s) Oral every 6 hours PRN Cough   Medications up to date at time of exam.    Medications up to date at time of exam.    FAMILY HISTORY:  No pertinent family history in first degree relatives        SOCIAL HISTORY  Smoking History: Denies smoking/smoke exposure.   Living Condition: [   ] apartment, [   ] private house  Work History:   Travel History: denies recent travel  Illicit Substance Use: denies  Alcohol Use: denies    REVIEW OF SYSTEMS:    CONSTITUTIONAL:  No staff report of  fevers, chills. Denies Night sweats.     HEENT:  Denies blurred vision, sore throat or runny nose.    CARDIOVASCULAR:  Denies chest discomfort . No palpitations.    RESPIRATORY:  Has SOB on exertion and wheezing.      GASTROINTESTINAL:  Denies abdominal pain. No nausea, vomiting or diarrhea on exam .    GENITOURINARY: No hematuria.     NEUROLOGIC:  No seizures or weakness.    PSYCHIATRIC:  No emotional distress onexam.     PHYSICAL EXAMINATION:    GENERAL: Alert and oriented. Able to answer question with no SOB. Morbid Obese. No acute distress.     Vital Signs Last 24 Hrs  T(C): 36.4 (15 Nov 2021 10:45), Max: 36.8 (14 Nov 2021 20:40)  T(F): 97.6 (15 Nov 2021 10:45), Max: 98.3 (14 Nov 2021 20:40)  HR: 88 (15 Nov 2021 15:10) (2 - 88)  BP: 162/118 (15 Nov 2021 15:10) (125/79 - 175/135)  BP(mean): --  RR: 18 (15 Nov 2021 10:45) (18 - 20)  SpO2: 98% (15 Nov 2021 15:10) (96% - 100%)   (if applicable)      HEENT: Head is normocephalic and atraumatic. No nasal tenderness. Legally blind. Moist mucosa.      NECK: Supple, no palpable adenopathy.    LUNGS: Fair air entrance . Has bilateral expiratory wheeze.     HEART: S1 S2 Regular rate and click/ rub.     ABDOMEN: Soft, nontender, and nondistended. Active bowel sounds.     EXTREMITIES: Without any cyanosis, clubbing, rash, lesions or edema.    NEUROLOGIC: Awake, alert, oriented.     SKIN: Warm, dry, good turgor.      LABS:                        13.6   6.53  )-----------( 260      ( 15 Nov 2021 06:18 )             43.0     11-15    138  |  104  |  15  ----------------------------<  170<H>  4.2   |  30  |  0.93    Ca    8.9      15 Nov 2021 06:18  Phos  2.8     11-15  Mg     2.1     11-15    TPro  8.1  /  Alb  3.2<L>  /  TBili  0.5  /  DBili  x   /  AST  12  /  ALT  25  /  AlkPhos  160<H>  11-15    PT/INR - ( 14 Nov 2021 16:33 )   PT: 18.0 sec;   INR: 1.54 ratio         PTT - ( 14 Nov 2021 16:33 )  PTT:42.7 sec            Serum Pro-Brain Natriuretic Peptide: 750 pg/mL (11-14-21 @ 16:33)          MICROBIOLOGY: (if applicable)    RADIOLOGY & ADDITIONAL STUDIES:  EKG:   CT chest:< from: CT Chest No Cont (11.14.21 @ 17:05) >    EXAM:  CT CHEST                            PROCEDURE DATE:  11/14/2021          INTERPRETATION:  EXAMINATION: CT CHEST    CLINICAL INDICATION: Dyspnea.    TECHNIQUE: Noncontrast CT of the chest was obtained.    COMPARISON: 5/25/2019.    FINDINGS:    AIRWAYS AND LUNGS: Scattered tracheobronchial secretions  . Left apical 1.5 cm ill-defined opacity is slightly enlarged from prior study. Lungs otherwise clear.    MEDIASTINUM AND PLEURA: There are no enlarged mediastinal, hilar or axillary lymph nodes. The visualized portion of the thyroid gland is unremarkable. There is no pleural effusion. There is no pneumothorax.    HEART AND VESSELS: There is cardiomegaly.  There are atherosclerotic calcifications of the aorta and coronary arteries.  Thereis no pericardial effusion.  Aortic valve calcification.    UPPER ABDOMEN: Images of the upper abdomen demonstrate no abnormality.    BONES AND SOFT TISSUES: There are mild degenerative changes of the spine.  The soft tissues are unremarkable.    TUBES/LINES: None.    IMPRESSION:  Left apical 1.5 cm ill-defined opacity is slightly enlarged from prior study and indeterminate.    Scattered tracheobronchial secretions    --- End of Report ---            MILLY JONES MD; Attending Radiologist  This document has been electronically signed. Nov 14 2021  7:06PM    < end of copied text >    ECHO:    IMPRESSION: 89y Female PAST MEDICAL & SURGICAL HISTORY:  History of hypertension    Arthritis    Legally blind    Pre-diabetes    Breast cancer  right    No significant past surgical history      89 Y/O Female presented to ED with productive cough with white sputum and  wheezing  x 1 week . Mild shortness of breadth , cough and SOB on exertion with Left apical 1.5 cm ill-defined opacity . Scattered tracheobronchial secretions . 11-12-21 Negative Covid 19 PCR . Symptoms due to acute bronchitis . Elevated troponin due to Type -2 MI     Suggestion:  O2 supp as needed to maintain sat >90  Please give  Duoneb via nebulization Q 6 Hours RTC instead of PRN due to persistent wheezing .    Solumedrol 40 mg IVP Q 12 Hours x 2 Days then Prednisone 40 mg oral daily x 5 Days.   On Ceftriaxone 1 Gm IVPB daily. Azithromycin 500 mg IVPB Daily.   Sputum for RVP   DVT/ GI prophylactic.  Outpatient PFT .

## 2021-11-15 NOTE — PHYSICAL THERAPY INITIAL EVALUATION ADULT - GAIT DEVIATIONS NOTED, PT EVAL
increased time in double stance/decreased velocity of limb motion/decreased step length/decreased stride length

## 2021-11-16 LAB
ALBUMIN SERPL ELPH-MCNC: 2.8 G/DL — LOW (ref 3.5–5)
ALP SERPL-CCNC: 127 U/L — HIGH (ref 40–120)
ALT FLD-CCNC: 23 U/L DA — SIGNIFICANT CHANGE UP (ref 10–60)
ANION GAP SERPL CALC-SCNC: 5 MMOL/L — SIGNIFICANT CHANGE UP (ref 5–17)
AST SERPL-CCNC: 16 U/L — SIGNIFICANT CHANGE UP (ref 10–40)
BILIRUB SERPL-MCNC: 0.6 MG/DL — SIGNIFICANT CHANGE UP (ref 0.2–1.2)
BUN SERPL-MCNC: 15 MG/DL — SIGNIFICANT CHANGE UP (ref 7–18)
CALCIUM SERPL-MCNC: 8.7 MG/DL — SIGNIFICANT CHANGE UP (ref 8.4–10.5)
CHLORIDE SERPL-SCNC: 104 MMOL/L — SIGNIFICANT CHANGE UP (ref 96–108)
CO2 SERPL-SCNC: 32 MMOL/L — HIGH (ref 22–31)
CREAT SERPL-MCNC: 0.81 MG/DL — SIGNIFICANT CHANGE UP (ref 0.5–1.3)
GLUCOSE BLDC GLUCOMTR-MCNC: 108 MG/DL — HIGH (ref 70–99)
GLUCOSE BLDC GLUCOMTR-MCNC: 122 MG/DL — HIGH (ref 70–99)
GLUCOSE BLDC GLUCOMTR-MCNC: 130 MG/DL — HIGH (ref 70–99)
GLUCOSE BLDC GLUCOMTR-MCNC: 140 MG/DL — HIGH (ref 70–99)
GLUCOSE SERPL-MCNC: 110 MG/DL — HIGH (ref 70–99)
HCT VFR BLD CALC: 39.6 % — SIGNIFICANT CHANGE UP (ref 34.5–45)
HGB BLD-MCNC: 12.8 G/DL — SIGNIFICANT CHANGE UP (ref 11.5–15.5)
MAGNESIUM SERPL-MCNC: 2.6 MG/DL — SIGNIFICANT CHANGE UP (ref 1.6–2.6)
MCHC RBC-ENTMCNC: 30.6 PG — SIGNIFICANT CHANGE UP (ref 27–34)
MCHC RBC-ENTMCNC: 32.3 GM/DL — SIGNIFICANT CHANGE UP (ref 32–36)
MCV RBC AUTO: 94.7 FL — SIGNIFICANT CHANGE UP (ref 80–100)
NRBC # BLD: 0 /100 WBCS — SIGNIFICANT CHANGE UP (ref 0–0)
PHOSPHATE SERPL-MCNC: 3.1 MG/DL — SIGNIFICANT CHANGE UP (ref 2.5–4.5)
PLATELET # BLD AUTO: 253 K/UL — SIGNIFICANT CHANGE UP (ref 150–400)
POTASSIUM SERPL-MCNC: 3.7 MMOL/L — SIGNIFICANT CHANGE UP (ref 3.5–5.3)
POTASSIUM SERPL-SCNC: 3.7 MMOL/L — SIGNIFICANT CHANGE UP (ref 3.5–5.3)
PROT SERPL-MCNC: 7 G/DL — SIGNIFICANT CHANGE UP (ref 6–8.3)
RBC # BLD: 4.18 M/UL — SIGNIFICANT CHANGE UP (ref 3.8–5.2)
RBC # FLD: 12.7 % — SIGNIFICANT CHANGE UP (ref 10.3–14.5)
SODIUM SERPL-SCNC: 141 MMOL/L — SIGNIFICANT CHANGE UP (ref 135–145)
WBC # BLD: 8.18 K/UL — SIGNIFICANT CHANGE UP (ref 3.8–10.5)
WBC # FLD AUTO: 8.18 K/UL — SIGNIFICANT CHANGE UP (ref 3.8–10.5)

## 2021-11-16 RX ADMIN — Medication 100 MILLIGRAM(S): at 05:54

## 2021-11-16 RX ADMIN — Medication 650 MILLIGRAM(S): at 21:20

## 2021-11-16 RX ADMIN — Medication 40 MILLIGRAM(S): at 17:43

## 2021-11-16 RX ADMIN — CARVEDILOL PHOSPHATE 6.25 MILLIGRAM(S): 80 CAPSULE, EXTENDED RELEASE ORAL at 17:43

## 2021-11-16 RX ADMIN — CEFTRIAXONE 100 MILLIGRAM(S): 500 INJECTION, POWDER, FOR SOLUTION INTRAMUSCULAR; INTRAVENOUS at 22:16

## 2021-11-16 RX ADMIN — APIXABAN 5 MILLIGRAM(S): 2.5 TABLET, FILM COATED ORAL at 17:43

## 2021-11-16 RX ADMIN — Medication 40 MILLIGRAM(S): at 05:54

## 2021-11-16 RX ADMIN — AZITHROMYCIN 255 MILLIGRAM(S): 500 TABLET, FILM COATED ORAL at 21:23

## 2021-11-16 RX ADMIN — CARVEDILOL PHOSPHATE 6.25 MILLIGRAM(S): 80 CAPSULE, EXTENDED RELEASE ORAL at 05:54

## 2021-11-16 RX ADMIN — Medication 650 MILLIGRAM(S): at 22:20

## 2021-11-16 RX ADMIN — ANASTROZOLE 1 MILLIGRAM(S): 1 TABLET ORAL at 12:16

## 2021-11-16 RX ADMIN — APIXABAN 5 MILLIGRAM(S): 2.5 TABLET, FILM COATED ORAL at 05:54

## 2021-11-16 RX ADMIN — SIMVASTATIN 20 MILLIGRAM(S): 20 TABLET, FILM COATED ORAL at 21:20

## 2021-11-16 NOTE — PROGRESS NOTE ADULT - PROBLEM SELECTOR PLAN 3
Pt with elevated troponin 190  f/u trop 2 likely demand ischaemia  EKG with no ischemic changes   Monitor on Tele   F/U ECHO Pt with elevated troponin 190  f/u trop 2 likely demand ischaemia  EKG with no ischemic changes   Monitor on Tele   ECHO - as above.

## 2021-11-16 NOTE — PROGRESS NOTE ADULT - SUBJECTIVE AND OBJECTIVE BOX
CHIEF COMPLAINT:Patient is a 89y old  Female who presents with a chief complaint of Shortness of breadth.Pt appears comfortable.    	  REVIEW OF SYSTEMS:  CONSTITUTIONAL: No fever, weight loss, or fatigue  EYES: No eye pain, visual disturbances, or discharge  ENT:  No difficulty hearing, tinnitus, vertigo; No sinus or throat pain  NECK: No pain or stiffness  RESPIRATORY: No cough, wheezing, chills or hemoptysis; No Shortness of Breath  CARDIOVASCULAR: No chest pain, palpitations, passing out, dizziness, or leg swelling  GASTROINTESTINAL: No abdominal or epigastric pain. No nausea, vomiting, or hematemesis; No diarrhea or constipation. No melena or hematochezia.  GENITOURINARY: No dysuria, frequency, hematuria, or incontinence  NEUROLOGICAL: No headaches, memory loss, loss of strength, numbness, or tremors  SKIN: No itching, burning, rashes, or lesions   LYMPH Nodes: No enlarged glands  ENDOCRINE: No heat or cold intolerance; No hair loss  MUSCULOSKELETAL: No joint pain or swelling; No muscle, back, or extremity pain  PSYCHIATRIC: No depression, anxiety, mood swings, or difficulty sleeping  HEME/LYMPH: No easy bruising, or bleeding gums  ALLERGY AND IMMUNOLOGIC: No hives or eczema	    PHYSICAL EXAM:  T(C): 36.6 (11-16-21 @ 08:00), Max: 36.8 (11-15-21 @ 15:20)  HR: 68 (11-16-21 @ 08:00) (68 - 88)  BP: 134/66 (11-16-21 @ 08:00) (119/62 - 175/135)  RR: 18 (11-16-21 @ 08:00) (18 - 18)  SpO2: 97% (11-16-21 @ 08:00) (94% - 100%)  Wt(kg): --  I&O's Summary    15 Nov 2021 07:01  -  16 Nov 2021 07:00  --------------------------------------------------------  IN: 225 mL / OUT: 300 mL / NET: -75 mL        Appearance: Normal	  HEENT:   Normal oral mucosa, PERRL, EOMI	  Lymphatic: No lymphadenopathy  Cardiovascular: Normal S1 S2, No JVD, No murmurs, No edema  Respiratory: Lungs clear to auscultation	  Psychiatry: A & O x 3, Mood & affect appropriate  Gastrointestinal:  Soft, Non-tender, + BS	  Skin: No rashes, No ecchymoses, No cyanosis	  Neurologic: Non-focal  Extremities: Normal range of motion, No clubbing, cyanosis or edema  Vascular: Peripheral pulses palpable 2+ bilaterally    MEDICATIONS  (STANDING):  anastrozole 1 milliGRAM(s) Oral daily  apixaban 5 milliGRAM(s) Oral every 12 hours  azithromycin  IVPB 500 milliGRAM(s) IV Intermittent every 24 hours  azithromycin  IVPB      carvedilol 6.25 milliGRAM(s) Oral every 12 hours  cefTRIAXone   IVPB 1000 milliGRAM(s) IV Intermittent every 24 hours  dextrose 50% Injectable 25 Gram(s) IV Push once  furosemide    Tablet 40 milliGRAM(s) Oral daily  glucagon  Injectable 1 milliGRAM(s) IntraMuscular once  influenza  Vaccine (HIGH DOSE) 0.7 milliLiter(s) IntraMuscular once  insulin lispro (ADMELOG) corrective regimen sliding scale   SubCutaneous three times a day before meals  methylPREDNISolone sodium succinate Injectable 40 milliGRAM(s) IV Push every 12 hours  simvastatin 20 milliGRAM(s) Oral at bedtime      TELEMETRY: 	 afib 40-80's   	  	  LABS:	 	                   12.8   8.18  )-----------( 253      ( 16 Nov 2021 08:19 )             39.6     11-16    141  |  104  |  15  ----------------------------<  110<H>  3.7   |  32<H>  |  0.81    Ca    8.7      16 Nov 2021 08:19  Phos  3.1     11-16  Mg     2.6     11-16    TPro  7.0  /  Alb  2.8<L>  /  TBili  0.6  /  DBili  x   /  AST  16  /  ALT  23  /  AlkPhos  127<H>  11-16    proBNP: Serum Pro-Brain Natriuretic Peptide: 750 pg/mL (11-14 @ 16:33)      	  EXAM:  CT CHEST                            PROCEDURE DATE:  11/14/2021          INTERPRETATION:  EXAMINATION: CT CHEST    CLINICAL INDICATION: Dyspnea.    TECHNIQUE: Noncontrast CT of the chest was obtained.    COMPARISON: 5/25/2019.    FINDINGS:    AIRWAYS AND LUNGS: Scattered tracheobronchial secretions  . Left apical 1.5 cm ill-defined opacity is slightly enlarged from prior study. Lungs otherwise clear.    MEDIASTINUM AND PLEURA: There are no enlarged mediastinal, hilar or axillary lymph nodes. The visualized portion of the thyroid gland is unremarkable. There is no pleural effusion. There is no pneumothorax.    HEART AND VESSELS: There is cardiomegaly.  There are atherosclerotic calcifications of the aorta and coronary arteries.  Thereis no pericardial effusion.  Aortic valve calcification.    UPPER ABDOMEN: Images of the upper abdomen demonstrate no abnormality.    BONES AND SOFT TISSUES: There are mild degenerative changes of the spine.  The soft tissues are unremarkable.    TUBES/LINES: None.    IMPRESSION:  Left apical 1.5 cm ill-defined opacity is slightly enlarged from prior study and indeterminate.    Scattered tracheobronchial secretions

## 2021-11-16 NOTE — PROGRESS NOTE ADULT - PROBLEM SELECTOR PLAN 5
On Coreg and Eliquis   Continue with home med  ADD ASA IF OK BY CARDIOLOGY On Coreg and Eliquis   Continue with home med

## 2021-11-16 NOTE — PROGRESS NOTE ADULT - SUBJECTIVE AND OBJECTIVE BOX
Time of Visit:  Patient seen and examined.     MEDICATIONS  (STANDING):  anastrozole 1 milliGRAM(s) Oral daily  apixaban 5 milliGRAM(s) Oral every 12 hours  azithromycin  IVPB 500 milliGRAM(s) IV Intermittent every 24 hours  azithromycin  IVPB      carvedilol 6.25 milliGRAM(s) Oral every 12 hours  cefTRIAXone   IVPB 1000 milliGRAM(s) IV Intermittent every 24 hours  dextrose 50% Injectable 25 Gram(s) IV Push once  furosemide    Tablet 40 milliGRAM(s) Oral daily  glucagon  Injectable 1 milliGRAM(s) IntraMuscular once  influenza  Vaccine (HIGH DOSE) 0.7 milliLiter(s) IntraMuscular once  insulin lispro (ADMELOG) corrective regimen sliding scale   SubCutaneous three times a day before meals  methylPREDNISolone sodium succinate Injectable 40 milliGRAM(s) IV Push every 12 hours  simvastatin 20 milliGRAM(s) Oral at bedtime      MEDICATIONS  (PRN):  acetaminophen     Tablet .. 650 milliGRAM(s) Oral every 6 hours PRN Temp greater or equal to 38C (100.4F), Mild Pain (1 - 3)  albuterol/ipratropium for Nebulization 3 milliLiter(s) Nebulizer every 6 hours PRN Shortness of Breath and/or Wheezing  guaiFENesin Oral Liquid (Sugar-Free) 100 milliGRAM(s) Oral every 6 hours PRN Cough       Medications up to date at time of exam.      PHYSICAL EXAMINATION:  Patient has no new complaints.  GENERAL: The patient is a well-developed, well-nourished, in no apparent distress.     Vital Signs Last 24 Hrs  T(C): 36.3 (16 Nov 2021 15:47), Max: 36.8 (16 Nov 2021 00:13)  T(F): 97.3 (16 Nov 2021 15:47), Max: 98.3 (16 Nov 2021 00:13)  HR: 72 (16 Nov 2021 17:43) (68 - 78)  BP: 149/98 (16 Nov 2021 17:43) (119/62 - 154/83)  BP(mean): --  RR: 18 (16 Nov 2021 15:47) (18 - 18)  SpO2: 95% (16 Nov 2021 15:47) (94% - 97%)   (if applicable)    Chest Tube (if applicable)    HEENT: Head is normocephalic and atraumatic. Extraocular muscles are intact. Mucous membranes are moist.     NECK: Supple, no palpable adenopathy.    LUNGS: Clear to auscultation, no wheezing, rales, or rhonchi.    HEART: Regular rate and rhythm without murmur.    ABDOMEN: Soft, nontender, and nondistended.  No hepatosplenomegaly is noted.    : No painful voiding, no pelvic pain    EXTREMITIES: Without any cyanosis, clubbing, rash, lesions or edema.    NEUROLOGIC: Awake, alert, oriented, grossly intact    SKIN: Warm, dry, good turgor.      LABS:                        12.8   8.18  )-----------( 253      ( 16 Nov 2021 08:19 )             39.6     11-16    141  |  104  |  15  ----------------------------<  110<H>  3.7   |  32<H>  |  0.81    Ca    8.7      16 Nov 2021 08:19  Phos  3.1     11-16  Mg     2.6     11-16    TPro  7.0  /  Alb  2.8<L>  /  TBili  0.6  /  DBili  x   /  AST  16  /  ALT  23  /  AlkPhos  127<H>  11-16                Serum Pro-Brain Natriuretic Peptide: 750 pg/mL (11-14-21 @ 16:33)          MICROBIOLOGY: (if applicable)    RADIOLOGY & ADDITIONAL STUDIES:  EKG:   CXR:  ECHO:    IMPRESSION: 89y Female PAST MEDICAL & SURGICAL HISTORY:  History of hypertension    Arthritis    Legally blind    Pre-diabetes    Breast cancer  right    No significant past surgical history     p/w           89 Y/O Female presented to ED with productive cough with white sputum and  wheezing  x 1 week . Mild shortness of breadth , cough and SOB on exertion with Left apical 1.5 cm ill-defined opacity . Scattered tracheobronchial secretions . 11-12-21 Negative Covid 19 PCR . Symptoms due to acute bronchitis . Elevated troponin due to Type -2 MI . Pat is afebrile and with normal WBC     Suggestion:  -d/c antibx since pat afebrile , normal WBC and neg cultures  -continue albuterol inhaler   -change solumedrol to prednisone  20 mg x 5 days   -OOB to chair   -DVT / GI Prophy    spoke with grand daughter Isabelle and dr Bianchi

## 2021-11-16 NOTE — PROGRESS NOTE ADULT - SUBJECTIVE AND OBJECTIVE BOX
PGY-1 Progress Note discussed with attending    PAGER #: [[phone]] TILL 5:00 PM  PLEASE CONTACT ON CALL TEAM:  - On Call Team (Please refer to Cassy) FROM 5:00 PM - 8:30PM  - Nightfloat Team FROM 8:30 -7:30 AM    CHIEF COMPLAINT & BRIEF HOSPITAL COURSE:    INTERVAL HPI/OVERNIGHT EVENTS:       REVIEW OF SYSTEMS:  CONSTITUTIONAL: No fever, weight loss, or fatigue  RESPIRATORY: No cough, wheezing, chills or hemoptysis; No shortness of breath  CARDIOVASCULAR: No chest pain, palpitations, dizziness, or leg swelling  GASTROINTESTINAL: No abdominal pain. No nausea, vomiting, or hematemesis; No diarrhea or constipation. No melena or hematochezia.  GENITOURINARY: No dysuria or hematuria, urinary frequency  NEUROLOGICAL: No headaches, memory loss, loss of strength, numbness, or tremors  SKIN: No itching, burning, rashes, or lesions     Vital Signs Last 24 Hrs  T(C): 36.6 (16 Nov 2021 08:00), Max: 36.8 (15 Nov 2021 15:20)  T(F): 97.9 (16 Nov 2021 08:00), Max: 98.3 (15 Nov 2021 15:20)  HR: 68 (16 Nov 2021 08:00) (68 - 88)  BP: 134/66 (16 Nov 2021 08:00) (119/62 - 175/135)  BP(mean): --  RR: 18 (16 Nov 2021 08:00) (18 - 18)  SpO2: 97% (16 Nov 2021 08:00) (94% - 100%)    PHYSICAL EXAMINATION:  GENERAL: NAD, well built  HEAD:  Atraumatic, Normocephalic  EYES:  conjunctiva and sclera clear  NECK: Supple, No JVD, Normal thyroid  CHEST/LUNG: Clear to auscultation. Clear to percussion bilaterally; No rales, rhonchi, wheezing, or rubs  HEART: Regular rate and rhythm; No murmurs, rubs, or gallops  ABDOMEN: Soft, Nontender, Nondistended; Bowel sounds present  NERVOUS SYSTEM:  Alert & Oriented X3,    EXTREMITIES:  2+ Peripheral Pulses, No clubbing, cyanosis, or edema  SKIN: warm dry                          12.8   8.18  )-----------( 253      ( 16 Nov 2021 08:19 )             39.6     11-16    141  |  104  |  15  ----------------------------<  110<H>  3.7   |  32<H>  |  0.81    Ca    8.7      16 Nov 2021 08:19  Phos  3.1     11-16  Mg     2.6     11-16    TPro  7.0  /  Alb  2.8<L>  /  TBili  0.6  /  DBili  x   /  AST  16  /  ALT  23  /  AlkPhos  127<H>  11-16    LIVER FUNCTIONS - ( 16 Nov 2021 08:19 )  Alb: 2.8 g/dL / Pro: 7.0 g/dL / ALK PHOS: 127 U/L / ALT: 23 U/L DA / AST: 16 U/L / GGT: x               PT/INR - ( 14 Nov 2021 16:33 )   PT: 18.0 sec;   INR: 1.54 ratio         PTT - ( 14 Nov 2021 16:33 )  PTT:42.7 sec    CAPILLARY BLOOD GLUCOSE      RADIOLOGY & ADDITIONAL TESTS:                   PGY-1 Progress Note discussed with attending    PAGER #: [[phone]] TILL 5:00 PM  PLEASE CONTACT ON CALL TEAM:  - On Call Team (Please refer to Cassy) FROM 5:00 PM - 8:30PM  - Nightfloat Team FROM 8:30 -7:30 AM    CHIEF COMPLAINT & BRIEF HOSPITAL COURSE: HPI:  89 yo F from home, lives with granddaughter, has HHA, legally blind , walks with cane , ambulation limited due to blindness and OA, Pmhx of breast cancer, Afib, HFpEF, HTN, DVT on Eliquis came with cough and wheezing . Pt stated that she started having wheezes 1 week ago due to cold associated with productive cough (white color mucus) and mild shortness of breadth at rest.  She denied any runny nose, hemoptysis, smoking, seasonal allergies, post nasal dripping, sore throat, fevers, chest pain, N/V/D.     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  Pt denied any chest pain, shortness of breath, nausea, vomiting or abdominal pain. States she has improved since being admitted in the hospital.       REVIEW OF SYSTEMS:  CONSTITUTIONAL: No fever, weight loss, or fatigue  RESPIRATORY: No cough, wheezing, chills or hemoptysis; No shortness of breath  CARDIOVASCULAR: No chest pain, palpitations, dizziness, or leg swelling  GASTROINTESTINAL: No abdominal pain. No nausea, vomiting, or hematemesis; No diarrhea or constipation. No melena or hematochezia.  GENITOURINARY: No dysuria or hematuria, urinary frequency  NEUROLOGICAL: No headaches, memory loss, loss of strength, numbness, or tremors  SKIN: No itching, burning, rashes, or lesions     Vital Signs Last 24 Hrs  T(C): 36.6 (16 Nov 2021 08:00), Max: 36.8 (15 Nov 2021 15:20)  T(F): 97.9 (16 Nov 2021 08:00), Max: 98.3 (15 Nov 2021 15:20)  HR: 68 (16 Nov 2021 08:00) (68 - 88)  BP: 134/66 (16 Nov 2021 08:00) (119/62 - 175/135)  BP(mean): --  RR: 18 (16 Nov 2021 08:00) (18 - 18)  SpO2: 97% (16 Nov 2021 08:00) (94% - 100%)    MEDICATIONS  (STANDING):  anastrozole 1 milliGRAM(s) Oral daily  apixaban 5 milliGRAM(s) Oral every 12 hours  azithromycin  IVPB 500 milliGRAM(s) IV Intermittent every 24 hours  azithromycin  IVPB      carvedilol 6.25 milliGRAM(s) Oral every 12 hours  cefTRIAXone   IVPB 1000 milliGRAM(s) IV Intermittent every 24 hours  dextrose 50% Injectable 25 Gram(s) IV Push once  furosemide    Tablet 40 milliGRAM(s) Oral daily  glucagon  Injectable 1 milliGRAM(s) IntraMuscular once  influenza  Vaccine (HIGH DOSE) 0.7 milliLiter(s) IntraMuscular once  insulin lispro (ADMELOG) corrective regimen sliding scale   SubCutaneous three times a day before meals  methylPREDNISolone sodium succinate Injectable 40 milliGRAM(s) IV Push every 12 hours  simvastatin 20 milliGRAM(s) Oral at bedtime    MEDICATIONS  (PRN):  acetaminophen     Tablet .. 650 milliGRAM(s) Oral every 6 hours PRN Temp greater or equal to 38C (100.4F), Mild Pain (1 - 3)  albuterol/ipratropium for Nebulization 3 milliLiter(s) Nebulizer every 6 hours PRN Shortness of Breath and/or Wheezing  guaiFENesin Oral Liquid (Sugar-Free) 100 milliGRAM(s) Oral every 6 hours PRN Cough      PHYSICAL EXAMINATION:  GENERAL: NAD, well built  HEAD:  Atraumatic, Normocephalic  EYES:  conjunctiva and sclera clear  NECK: Supple, No JVD,   CHEST/LUNG: Clear to auscultation.; No rales, rhonchi, wheezing, or rubs  HEART: Regular rate and rhythm; No murmurs, rubs, or gallops  ABDOMEN: Soft, Nontender, Nondistended; Bowel sounds present  NERVOUS SYSTEM:  Alert & Oriented X3,    EXTREMITIES:  2+ Peripheral Pulses, No clubbing, cyanosis, or edema  SKIN: warm dry                          12.8   8.18  )-----------( 253      ( 16 Nov 2021 08:19 )             39.6     11-16    141  |  104  |  15  ----------------------------<  110<H>  3.7   |  32<H>  |  0.81    Ca    8.7      16 Nov 2021 08:19  Phos  3.1     11-16  Mg     2.6     11-16    TPro  7.0  /  Alb  2.8<L>  /  TBili  0.6  /  DBili  x   /  AST  16  /  ALT  23  /  AlkPhos  127<H>  11-16    LIVER FUNCTIONS - ( 16 Nov 2021 08:19 )  Alb: 2.8 g/dL / Pro: 7.0 g/dL / ALK PHOS: 127 U/L / ALT: 23 U/L DA / AST: 16 U/L / GGT: x               PT/INR - ( 14 Nov 2021 16:33 )   PT: 18.0 sec;   INR: 1.54 ratio         PTT - ( 14 Nov 2021 16:33 )  PTT:42.7 sec    CAPILLARY BLOOD GLUCOSE      RADIOLOGY & ADDITIONAL TESTS:    < from: Transthoracic Echocardiogram (11.15.21 @ 08:24) >    Patient name: NUNO LAST  YOB: 1931   Age: 89 (F)   MR#: [id number]  Study Date: 11/15/2021  Location: [address] [place], [place] [address]Sonographer: Heath Maldonado [facility]  Study quality: Fair  Referring Physician: Ayden Bianchi MD  Blood Pressure: 157/96 mmHg  Height: 178 cm  Weight: 123 kg  BSA: 2.4 m2  ------------------------------------------------------------------------    PROCEDURE: Transthoracic echocardiogram with 2-D, M-Mode  and complete spectral and color flow Doppler.  INDICATION: Dyspnea, unspecified (R06.00)  HISTORY:  ------------------------------------------------------------------------  DIMENSIONS:  Dimensions:     Normal Values:  LA:     4.2 cm    2.0 - 4.0 cm  Ao:     3.5 cm    2.0 - 3.8 cm  SEPTUM: 1.3 cm    0.6 - 1.2 cm  PWT: 1.0 cm    0.6 - 1.1 cm  LVIDd:  5.4 cm    3.0 - 5.6 cm  LVIDs:  4.0 cm    1.8 - 4.0 cm      Derived Variables:  LVMI: 105 g/m2  RWT: 0.37  Ejection Fraction Visual Estimate: 50-55 %    ------------------------------------------------------------------------  OBSERVATIONS:  Mitral Valve: Thickened mitral valve leaflets. Trace mitral  regurgitation.  Aortic Root: Aortic Root: 3.5 cm.    Aortic Valve: Calcified trileaflet aortic valve with normal  opening. Trace aortic regurgitation.  Left Atrium: Mildly dilated left atrium.  LA volume index =  39 cc/m2.  Left Ventricle: Endocardium not well visualized; grossly  low-normal left ventricular systolic function. In the  setting of atrial fibrillation, ejection fraction can vary  with the R-R interval.  Concentric left ventricular  hypertrophy is present.  Unable to accurately assess  diastolic function due to presence of arrhythmia.  Right Heart: Borderline enlarged right atrium.  Off axis  images preclude accurate assessment of right ventricular  size. Normal right ventricular systolic function (TAPSE 2.0  cm). Tricuspid valve not well seen.  Pulmonic valve not  well seen. Trace pulmonic insufficiency is noted.  Pericardium/PleuraNormal pericardium with no pericardial  effusion.  Hemodynamic: RA Pressure is 8 mm Hg. Insufficient tricuspid  regurgitation jet to allow calculation of RVSP.  ------------------------------------------------------------------------  CONCLUSIONS:  1. Thickened mitral valve leaflets. Trace mitral  regurgitation.  2. Calcified trileaflet aortic valve with normal opening.  Trace aortic regurgitation.  3. Mildly dilated left atrium.  LA volume index = 39 cc/m2.  4. Concentric left ventricular hypertrophy is present.  5. Endocardium not well visualized; grossly low-normal left  ventricular systolic function. In the setting of atrial  fibrillation, ejection fraction can vary with the R-R  interval.  6. Unable to accurately assess diastolic function due to  presence of arrhythmia.  7. Borderline enlarged right atrium.  8. Off axis images preclude accurate assessment of right  ventricular size. Normal right ventricular systolic  function (TAPSE 2.0 cm).  9. RA Pressure is 8 mm Hg.  10. Insufficient tricuspid regurgitation jet to allow  calculation of RVSP.  11. Tricuspid valve not well seen.  12. Pulmonic valve not well seen. Trace pulmonic  insufficiency is noted.    *** Compared with echocardiogram of 4/14/2020, Definity  echo contrast was not used in the current study. Left  ventricular hypertrophyis more prominent on the current  study.  ------------------------------------------------------------------------  Confirmed on  11/16/2021 - 13:46:21 by Pauline Lentz MD  ------------------------------------------------------------------------    < end of copied text >

## 2021-11-16 NOTE — PROGRESS NOTE ADULT - PROBLEM SELECTOR PLAN 1
Pt came with cough and wheezing for 1 week   Chest Xray without any pulmonary edema or consolidation   CT chest showed Left apical 1.5 cm ill-defined opacity is slightly enlarged from prior study and indeterminate. 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   Pulm Dr Tinsley consulted   Cardio Dr Denton consulted

## 2021-11-17 LAB
ALBUMIN SERPL ELPH-MCNC: 2.9 G/DL — LOW (ref 3.5–5)
ALP SERPL-CCNC: 144 U/L — HIGH (ref 40–120)
ALT FLD-CCNC: 25 U/L DA — SIGNIFICANT CHANGE UP (ref 10–60)
ANION GAP SERPL CALC-SCNC: 4 MMOL/L — LOW (ref 5–17)
AST SERPL-CCNC: 15 U/L — SIGNIFICANT CHANGE UP (ref 10–40)
BILIRUB SERPL-MCNC: 0.4 MG/DL — SIGNIFICANT CHANGE UP (ref 0.2–1.2)
BUN SERPL-MCNC: 16 MG/DL — SIGNIFICANT CHANGE UP (ref 7–18)
CALCIUM SERPL-MCNC: 8.6 MG/DL — SIGNIFICANT CHANGE UP (ref 8.4–10.5)
CHLORIDE SERPL-SCNC: 99 MMOL/L — SIGNIFICANT CHANGE UP (ref 96–108)
CO2 SERPL-SCNC: 35 MMOL/L — HIGH (ref 22–31)
CREAT SERPL-MCNC: 0.78 MG/DL — SIGNIFICANT CHANGE UP (ref 0.5–1.3)
GLUCOSE BLDC GLUCOMTR-MCNC: 157 MG/DL — HIGH (ref 70–99)
GLUCOSE BLDC GLUCOMTR-MCNC: 162 MG/DL — HIGH (ref 70–99)
GLUCOSE BLDC GLUCOMTR-MCNC: 166 MG/DL — HIGH (ref 70–99)
GLUCOSE BLDC GLUCOMTR-MCNC: 176 MG/DL — HIGH (ref 70–99)
GLUCOSE SERPL-MCNC: 162 MG/DL — HIGH (ref 70–99)
HCT VFR BLD CALC: 45 % — SIGNIFICANT CHANGE UP (ref 34.5–45)
HGB BLD-MCNC: 14.3 G/DL — SIGNIFICANT CHANGE UP (ref 11.5–15.5)
MAGNESIUM SERPL-MCNC: 2.1 MG/DL — SIGNIFICANT CHANGE UP (ref 1.6–2.6)
MCHC RBC-ENTMCNC: 30.2 PG — SIGNIFICANT CHANGE UP (ref 27–34)
MCHC RBC-ENTMCNC: 31.8 GM/DL — LOW (ref 32–36)
MCV RBC AUTO: 95.1 FL — SIGNIFICANT CHANGE UP (ref 80–100)
NRBC # BLD: 0 /100 WBCS — SIGNIFICANT CHANGE UP (ref 0–0)
PHOSPHATE SERPL-MCNC: 4.2 MG/DL — SIGNIFICANT CHANGE UP (ref 2.5–4.5)
PLATELET # BLD AUTO: 305 K/UL — SIGNIFICANT CHANGE UP (ref 150–400)
POTASSIUM SERPL-MCNC: 4.1 MMOL/L — SIGNIFICANT CHANGE UP (ref 3.5–5.3)
POTASSIUM SERPL-SCNC: 4.1 MMOL/L — SIGNIFICANT CHANGE UP (ref 3.5–5.3)
PROT SERPL-MCNC: 7.7 G/DL — SIGNIFICANT CHANGE UP (ref 6–8.3)
RBC # BLD: 4.73 M/UL — SIGNIFICANT CHANGE UP (ref 3.8–5.2)
RBC # FLD: 12.6 % — SIGNIFICANT CHANGE UP (ref 10.3–14.5)
SODIUM SERPL-SCNC: 138 MMOL/L — SIGNIFICANT CHANGE UP (ref 135–145)
WBC # BLD: 7.26 K/UL — SIGNIFICANT CHANGE UP (ref 3.8–10.5)
WBC # FLD AUTO: 7.26 K/UL — SIGNIFICANT CHANGE UP (ref 3.8–10.5)

## 2021-11-17 RX ADMIN — Medication 40 MILLIGRAM(S): at 05:40

## 2021-11-17 RX ADMIN — CARVEDILOL PHOSPHATE 6.25 MILLIGRAM(S): 80 CAPSULE, EXTENDED RELEASE ORAL at 05:40

## 2021-11-17 RX ADMIN — APIXABAN 5 MILLIGRAM(S): 2.5 TABLET, FILM COATED ORAL at 05:40

## 2021-11-17 RX ADMIN — Medication 20 MILLIGRAM(S): at 11:33

## 2021-11-17 RX ADMIN — CARVEDILOL PHOSPHATE 6.25 MILLIGRAM(S): 80 CAPSULE, EXTENDED RELEASE ORAL at 17:23

## 2021-11-17 RX ADMIN — Medication 1: at 17:38

## 2021-11-17 RX ADMIN — Medication 650 MILLIGRAM(S): at 21:27

## 2021-11-17 RX ADMIN — SIMVASTATIN 20 MILLIGRAM(S): 20 TABLET, FILM COATED ORAL at 21:27

## 2021-11-17 RX ADMIN — Medication 650 MILLIGRAM(S): at 22:31

## 2021-11-17 RX ADMIN — Medication 1: at 08:13

## 2021-11-17 RX ADMIN — ANASTROZOLE 1 MILLIGRAM(S): 1 TABLET ORAL at 11:33

## 2021-11-17 RX ADMIN — APIXABAN 5 MILLIGRAM(S): 2.5 TABLET, FILM COATED ORAL at 17:24

## 2021-11-17 NOTE — PROGRESS NOTE ADULT - NUTRITIONAL ASSESSMENT
Diet, Regular:   DASH/TLC {Sodium & Cholesterol Restricted}  No Egg  No Poultry (11-14-21 @ 21:55) [Active]
Diet, Regular:   DASH/TLC {Sodium & Cholesterol Restricted}  No Egg  No Poultry (11-14-21 @ 21:55) [Active]
Patient is a 89 year old F from home, lives with granddaughter, has HHA, legally blind , walks with cane , ambulation limited due to blindness and OA, with PMH of breast cancer, HFpEF, HTN, Afib and DVT on Eliquis. Patient presented to ED with cough and wheezing. Pt stated that she started having wheezes 1 week ago due to cold associated with productive cough (white color mucus) and mild shortness of breadth at rest.  Patient admitted to telemetry for Type II MI associated with CAP versus bronchitits. Patient troponin positive x3, trending down. Chest Xray without any pulmonary edema or consolidation. CT chest showed Left apical 1.5 cm ill-defined opacity is slightly enlarged from prior study and indeterminate. Scattered tracheobronchial secretions.
Diet, Regular:   DASH/TLC {Sodium & Cholesterol Restricted}  No Egg  No Poultry (11-14-21 @ 21:55) [Active]

## 2021-11-17 NOTE — PROGRESS NOTE ADULT - PROBLEM SELECTOR PLAN 9
IMPROVE VTE score: 3  Will manage with: Eliquis     [x ] Previous VTE                                    3  [ ] Thrombophilia                                  2  [ ] Lower limb paralysis                        2  (unable to hold up >15 seconds)    [ ] Current Cancer (within 6 months)        2   [x] Immobilization > 24 hrs                    1  [ ] ICU/CCU stay > 24 hrs                      1  [x] Age > 60                                         1 Continue with home med Lasix 40mg OD

## 2021-11-17 NOTE — PROGRESS NOTE ADULT - PROBLEM SELECTOR PLAN 3
Pt with elevated troponin 190  f/u trop 2 likely demand ischaemia  EKG with no ischemic changes   Monitor on Tele   ECHO - as above.

## 2021-11-17 NOTE — PROGRESS NOTE ADULT - PROBLEM SELECTOR PLAN 2
Same as above Pt was found to have a pulmonary nodule on CT.   - The Nodule has increased in size as per the read.   - Continue to follow.   - Pulmonary aware and stated no intervention at this point.   - f/u Outpt.

## 2021-11-17 NOTE — PROGRESS NOTE ADULT - PROBLEM SELECTOR PLAN 3
Pt with elevated troponin 190  f/u trop 2 likely demand ischaemia  EKG with no ischemic changes   Monitor on Tele   ECHO - as above. Same as above

## 2021-11-17 NOTE — PROGRESS NOTE ADULT - SUBJECTIVE AND OBJECTIVE BOX
PGY-1 Progress Note discussed with attending    PAGER #: [[phone]] TILL 5:00 PM  PLEASE CONTACT ON CALL TEAM:  - On Call Team (Please refer to Cassy) FROM 5:00 PM - 8:30PM  - Nightfloat Team FROM 8:30 -7:30 AM    CHIEF COMPLAINT & BRIEF HOSPITAL COURSE: HPI:  89 yo F from home, lives with granddaughter, has HHA, legally blind , walks with cane , ambulation limited due to blindness and OA, Pmhx of breast cancer, Afib, HFpEF, HTN, DVT on Eliquis came with cough and wheezing . Pt stated that she started having wheezes 1 week ago due to cold associated with productive cough (white color mucus) and mild shortness of breadth at rest.  She denied any runny nose, hemoptysis, smoking, seasonal allergies, post nasal dripping, sore throat, fevers, chest pain, N/V/D.     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  Pt denied any chest pain, shortness of breath, nausea, vomiting or abdominal pain.       REVIEW OF SYSTEMS:  CONSTITUTIONAL: No fever, weight loss, or fatigue  RESPIRATORY: No cough, wheezing, chills or hemoptysis; No shortness of breath  CARDIOVASCULAR: No chest pain, palpitations, dizziness, or leg swelling  GASTROINTESTINAL: No abdominal pain. No nausea, vomiting, or hematemesis; No diarrhea or constipation. No melena or hematochezia.  GENITOURINARY: No dysuria or hematuria, urinary frequency  NEUROLOGICAL: No headaches, memory loss, loss of strength, numbness, or tremors  SKIN: No itching, burning, rashes, or lesions     Vital Signs Last 24 Hrs  T(C): 36.8 (17 Nov 2021 11:39), Max: 37 (17 Nov 2021 08:00)  T(F): 98.2 (17 Nov 2021 11:39), Max: 98.6 (17 Nov 2021 08:00)  HR: 78 (17 Nov 2021 11:39) (67 - 85)  BP: 160/85 (17 Nov 2021 11:39) (146/72 - 169/90)  BP(mean): --  RR: 18 (17 Nov 2021 11:39) (18 - 18)  SpO2: 94% (17 Nov 2021 11:39) (92% - 96%)    MEDICATIONS  (STANDING):  anastrozole 1 milliGRAM(s) Oral daily  apixaban 5 milliGRAM(s) Oral every 12 hours  carvedilol 6.25 milliGRAM(s) Oral every 12 hours  dextrose 50% Injectable 25 Gram(s) IV Push once  furosemide    Tablet 40 milliGRAM(s) Oral daily  glucagon  Injectable 1 milliGRAM(s) IntraMuscular once  influenza  Vaccine (HIGH DOSE) 0.7 milliLiter(s) IntraMuscular once  insulin lispro (ADMELOG) corrective regimen sliding scale   SubCutaneous three times a day before meals  predniSONE   Tablet 20 milliGRAM(s) Oral daily  simvastatin 20 milliGRAM(s) Oral at bedtime    MEDICATIONS  (PRN):  acetaminophen     Tablet .. 650 milliGRAM(s) Oral every 6 hours PRN Temp greater or equal to 38C (100.4F), Mild Pain (1 - 3)  albuterol/ipratropium for Nebulization 3 milliLiter(s) Nebulizer every 6 hours PRN Shortness of Breath and/or Wheezing  guaiFENesin Oral Liquid (Sugar-Free) 100 milliGRAM(s) Oral every 6 hours PRN Cough      PHYSICAL EXAMINATION:  GENERAL: NAD, well built  HEAD:  Atraumatic, Normocephalic  EYES:  conjunctiva and sclera clear  NECK: Supple, No JVD,   CHEST/LUNG: Clear to auscultation. No rales, rhonchi, wheezing, or rubs  HEART: Regular rate and rhythm; No murmurs, rubs, or gallops  ABDOMEN: Soft, Nontender, Nondistended; Bowel sounds present  NERVOUS SYSTEM:  Alert & Oriented X3,    EXTREMITIES:  2+ Peripheral Pulses, No clubbing, cyanosis, or edema  SKIN: warm dry                          14.3   7.26  )-----------( 305      ( 17 Nov 2021 09:01 )             45.0     11-17    138  |  99  |  16  ----------------------------<  162<H>  4.1   |  35<H>  |  0.78    Ca    8.6      17 Nov 2021 09:01  Phos  4.2     11-17  Mg     2.1     11-17    TPro  7.7  /  Alb  2.9<L>  /  TBili  0.4  /  DBili  x   /  AST  15  /  ALT  25  /  AlkPhos  144<H>  11-17    LIVER FUNCTIONS - ( 17 Nov 2021 09:01 )  Alb: 2.9 g/dL / Pro: 7.7 g/dL / ALK PHOS: 144 U/L / ALT: 25 U/L DA / AST: 15 U/L / GGT: x                   CAPILLARY BLOOD GLUCOSE      RADIOLOGY & ADDITIONAL TESTS:

## 2021-11-17 NOTE — PROGRESS NOTE ADULT - PROBLEM SELECTOR PLAN 1
Pt came with cough and wheezing for 1 week   Chest Xray without any pulmonary edema or consolidation   CT chest showed Left apical 1.5 cm ill-defined opacity is slightly enlarged from prior study and indeterminate. 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   Pulm Dr Tinsley consulted   Cardio Dr Denton consulted

## 2021-11-17 NOTE — PROGRESS NOTE ADULT - SUBJECTIVE AND OBJECTIVE BOX
CHIEF COMPLAINT:Patient is a 89y old  Female who presents with a chief complaint of Shortness of breath.Pt appears comfortable.    	  REVIEW OF SYSTEMS:  CONSTITUTIONAL: No fever, weight loss, or fatigue  EYES: No eye pain, visual disturbances, or discharge  ENT:  No difficulty hearing, tinnitus, vertigo; No sinus or throat pain  NECK: No pain or stiffness  RESPIRATORY: No cough, wheezing, chills or hemoptysis; No Shortness of Breath  CARDIOVASCULAR: No chest pain, palpitations, passing out, dizziness, or leg swelling  GASTROINTESTINAL: No abdominal or epigastric pain. No nausea, vomiting, or hematemesis; No diarrhea or constipation. No melena or hematochezia.  GENITOURINARY: No dysuria, frequency, hematuria, or incontinence  NEUROLOGICAL: No headaches, memory loss, loss of strength, numbness, or tremors  SKIN: No itching, burning, rashes, or lesions   LYMPH Nodes: No enlarged glands  ENDOCRINE: No heat or cold intolerance; No hair loss  MUSCULOSKELETAL: No joint pain or swelling; No muscle, back, or extremity pain  PSYCHIATRIC: No depression, anxiety, mood swings, or difficulty sleeping  HEME/LYMPH: No easy bruising, or bleeding gums  ALLERGY AND IMMUNOLOGIC: No hives or eczema	      PHYSICAL EXAM:  T(C): 37 (11-17-21 @ 08:00), Max: 37 (11-17-21 @ 08:00)  HR: 85 (11-17-21 @ 08:00) (67 - 85)  BP: 146/72 (11-17-21 @ 08:00) (146/72 - 169/90)  RR: 18 (11-17-21 @ 08:00) (18 - 18)  SpO2: 94% (11-17-21 @ 08:00) (92% - 96%)  Wt(kg): --  I&O's Summary    16 Nov 2021 07:01  -  17 Nov 2021 07:00  --------------------------------------------------------  IN: 0 mL / OUT: 1600 mL / NET: -1600 mL        Appearance: Normal	  HEENT:   Normal oral mucosa, PERRL, EOMI	  Lymphatic: No lymphadenopathy  Cardiovascular: Normal S1 S2, No JVD, No murmurs, No edema  Respiratory: Lungs clear to auscultation	  Psychiatry: A & O x 3, Mood & affect appropriate  Gastrointestinal:  Soft, Non-tender, + BS	  Skin: No rashes, No ecchymoses, No cyanosis	  Neurologic: Non-focal  Extremities: Normal range of motion, No clubbing, cyanosis or edema  Vascular: Peripheral pulses palpable 2+ bilaterally    MEDICATIONS  (STANDING):  anastrozole 1 milliGRAM(s) Oral daily  apixaban 5 milliGRAM(s) Oral every 12 hours  carvedilol 6.25 milliGRAM(s) Oral every 12 hours  dextrose 50% Injectable 25 Gram(s) IV Push once  furosemide    Tablet 40 milliGRAM(s) Oral daily  glucagon  Injectable 1 milliGRAM(s) IntraMuscular once  influenza  Vaccine (HIGH DOSE) 0.7 milliLiter(s) IntraMuscular once  insulin lispro (ADMELOG) corrective regimen sliding scale   SubCutaneous three times a day before meals  predniSONE   Tablet 20 milliGRAM(s) Oral daily  simvastatin 20 milliGRAM(s) Oral at bedtime        	  LABS:	 	                     14.3   7.26  )-----------( 305      ( 17 Nov 2021 09:01 )             45.0     11-17    138  |  99  |  16  ----------------------------<  162<H>  4.1   |  35<H>  |  0.78    Ca    8.6      17 Nov 2021 09:01  Phos  4.2     11-17  Mg     2.1     11-17    TPro  7.7  /  Alb  2.9<L>  /  TBili  0.4  /  DBili  x   /  AST  15  /  ALT  25  /  AlkPhos  144<H>  11-17    proBNP: Serum Pro-Brain Natriuretic Peptide: 750 pg/mL (11-14 @ 16:33)        	      ec< from: Transthoracic Echocardiogram (11.15.21 @ 08:24) >  OBSERVATIONS:  Mitral Valve: Thickened mitral valve leaflets. Trace mitral  regurgitation.  Aortic Root: Aortic Root: 3.5 cm.    Aortic Valve: Calcified trileaflet aortic valve with normal  opening. Trace aortic regurgitation.  Left Atrium: Mildly dilated left atrium.  LA volume index =  39 cc/m2.  Left Ventricle: Endocardium not well visualized; grossly  low-normal left ventricular systolic function. In the  setting of atrial fibrillation, ejection fraction can vary  with the R-R interval.  Concentric left ventricular  hypertrophy is present.  Unable to accurately assess  diastolic function due to presence of arrhythmia.  Right Heart: Borderline enlarged right atrium.  Off axis  images preclude accurate assessment of right ventricular  size. Normal right ventricular systolic function (TAPSE 2.0  cm). Tricuspid valve not well seen.  Pulmonic valve not  well seen. Trace pulmonic insufficiency is noted.  Pericardium/PleuraNormal pericardium with no pericardial  effusion.  Hemodynamic: RA Pressure is 8 mm Hg. Insufficient tricuspid  regurgitation jet to allow calculation of RVSP.

## 2021-11-17 NOTE — PROGRESS NOTE ADULT - SUBJECTIVE AND OBJECTIVE BOX
Patient was seen and examined  Patient is a 89y old  Female who presents with a chief complaint of Shortness of breadth (17 Nov 2021 10:19)      INTERVAL HPI/OVERNIGHT EVENTS:  T(C): 36.8 (11-17-21 @ 11:39), Max: 37 (11-17-21 @ 08:00)  HR: 78 (11-17-21 @ 11:39) (67 - 85)  BP: 160/85 (11-17-21 @ 11:39) (146/72 - 169/90)  RR: 18 (11-17-21 @ 11:39) (18 - 18)  SpO2: 94% (11-17-21 @ 11:39) (92% - 96%)  Wt(kg): --  I&O's Summary    16 Nov 2021 07:01  -  17 Nov 2021 07:00  --------------------------------------------------------  IN: 0 mL / OUT: 1600 mL / NET: -1600 mL        LABS:                        14.3   7.26  )-----------( 305      ( 17 Nov 2021 09:01 )             45.0     11-17    138  |  99  |  16  ----------------------------<  162<H>  4.1   |  35<H>  |  0.78    Ca    8.6      17 Nov 2021 09:01  Phos  4.2     11-17  Mg     2.1     11-17    TPro  7.7  /  Alb  2.9<L>  /  TBili  0.4  /  DBili  x   /  AST  15  /  ALT  25  /  AlkPhos  144<H>  11-17        CAPILLARY BLOOD GLUCOSE      POCT Blood Glucose.: 176 mg/dL (17 Nov 2021 11:57)  POCT Blood Glucose.: 157 mg/dL (17 Nov 2021 08:00)  POCT Blood Glucose.: 140 mg/dL (16 Nov 2021 21:17)  POCT Blood Glucose.: 122 mg/dL (16 Nov 2021 16:59)              MEDICATIONS  (STANDING):  anastrozole 1 milliGRAM(s) Oral daily  apixaban 5 milliGRAM(s) Oral every 12 hours  carvedilol 6.25 milliGRAM(s) Oral every 12 hours  dextrose 50% Injectable 25 Gram(s) IV Push once  furosemide    Tablet 40 milliGRAM(s) Oral daily  glucagon  Injectable 1 milliGRAM(s) IntraMuscular once  influenza  Vaccine (HIGH DOSE) 0.7 milliLiter(s) IntraMuscular once  insulin lispro (ADMELOG) corrective regimen sliding scale   SubCutaneous three times a day before meals  predniSONE   Tablet 20 milliGRAM(s) Oral daily  simvastatin 20 milliGRAM(s) Oral at bedtime    MEDICATIONS  (PRN):  acetaminophen     Tablet .. 650 milliGRAM(s) Oral every 6 hours PRN Temp greater or equal to 38C (100.4F), Mild Pain (1 - 3)  albuterol/ipratropium for Nebulization 3 milliLiter(s) Nebulizer every 6 hours PRN Shortness of Breath and/or Wheezing  guaiFENesin Oral Liquid (Sugar-Free) 100 milliGRAM(s) Oral every 6 hours PRN Cough      RADIOLOGY & ADDITIONAL TESTS:    Imaging Personally Reviewed:  [ ] YES  [ ] NO    REVIEW OF SYSTEMS:  CONSTITUTIONAL: No fever, weight loss, or fatigue  EYES: No eye pain, visual disturbances, or discharge  ENMT:  No difficulty hearing, tinnitus, vertigo; No sinus or throat pain  NECK: No pain or stiffness  BREASTS: No pain, masses, or nipple discharge  RESPIRATORY: No cough, wheezing, chills or hemoptysis; No shortness of breath  CARDIOVASCULAR: No chest pain, palpitations, dizziness, or leg swelling  GASTROINTESTINAL: No abdominal or epigastric pain. No nausea, vomiting, or hematemesis; No diarrhea or constipation. No melena or hematochezia.  GENITOURINARY: No dysuria, frequency, hematuria, or incontinence  NEUROLOGICAL: No headaches, memory loss, loss of strength, numbness, or tremors  SKIN: No itching, burning, rashes, or lesions   LYMPH NODES: No enlarged glands  ENDOCRINE: No heat or cold intolerance; No hair loss  MUSCULOSKELETAL: No joint pain or swelling; No muscle, back, or extremity pain  PSYCHIATRIC: No depression, anxiety, mood swings, or difficulty sleeping  HEME/LYMPH: No easy bruising, or bleeding gums  ALLERY AND IMMUNOLOGIC: No hives or eczema      Consultant(s) Notes Reviewed:  [ ] YES  [ ] NO    PHYSICAL EXAM:  GENERAL: NAD, well-groomed, well-developed  HEAD:  Atraumatic, Normocephalic  EYES: blind   ENMT: No tonsillar erythema, exudates, or enlargement; Moist mucous membranes, Good dentition, No lesions  NECK: Supple, No JVD, Normal thyroid  NERVOUS SYSTEM:  Alert & Oriented X3, Good concentration; Motor Strength 5/5 B/L upper and lower extremities; DTRs 2+ intact and symmetric  CHEST/LUNG: Clear to percussion bilaterally; No rales, rhonchi, wheezing, or rubs  HEART: Regular rate and rhythm; No murmurs, rubs, or gallops  ABDOMEN: Soft, Nontender, Nondistended; Bowel sounds present  EXTREMITIES:  2+ Peripheral Pulses, No clubbing, cyanosis, or edema  LYMPH: No lymphadenopathy noted  SKIN: No rashes or lesions    Care Discussed with Consultants/Other Providers [ x] YES  [ ] NO

## 2021-11-17 NOTE — PROGRESS NOTE ADULT - PROBLEM SELECTOR PLAN 4
On coreg at home   Continue with home med Pt with elevated troponin 190  f/u trop 2 likely demand ischaemia  EKG with no ischemic changes   Monitor on Tele   ECHO - as above.

## 2021-11-17 NOTE — PROGRESS NOTE ADULT - PROBLEM SELECTOR PROBLEM 1
Type 2 MI (myocardial infarction)
Cough with sputum

## 2021-11-17 NOTE — PROGRESS NOTE ADULT - SUBJECTIVE AND OBJECTIVE BOX
Time of Visit:  Patient seen and examined.     MEDICATIONS  (STANDING):  anastrozole 1 milliGRAM(s) Oral daily  apixaban 5 milliGRAM(s) Oral every 12 hours  carvedilol 6.25 milliGRAM(s) Oral every 12 hours  dextrose 50% Injectable 25 Gram(s) IV Push once  furosemide    Tablet 40 milliGRAM(s) Oral daily  glucagon  Injectable 1 milliGRAM(s) IntraMuscular once  influenza  Vaccine (HIGH DOSE) 0.7 milliLiter(s) IntraMuscular once  insulin lispro (ADMELOG) corrective regimen sliding scale   SubCutaneous three times a day before meals  predniSONE   Tablet 20 milliGRAM(s) Oral daily  simvastatin 20 milliGRAM(s) Oral at bedtime      MEDICATIONS  (PRN):  acetaminophen     Tablet .. 650 milliGRAM(s) Oral every 6 hours PRN Temp greater or equal to 38C (100.4F), Mild Pain (1 - 3)  albuterol/ipratropium for Nebulization 3 milliLiter(s) Nebulizer every 6 hours PRN Shortness of Breath and/or Wheezing  guaiFENesin Oral Liquid (Sugar-Free) 100 milliGRAM(s) Oral every 6 hours PRN Cough       Medications up to date at time of exam.      PHYSICAL EXAMINATION:  Patient has no new complaints.  GENERAL: The patient is a well-developed, well-nourished, in no apparent distress.     Vital Signs Last 24 Hrs  T(C): 36.6 (17 Nov 2021 20:40), Max: 37 (17 Nov 2021 08:00)  T(F): 97.9 (17 Nov 2021 20:40), Max: 98.6 (17 Nov 2021 08:00)  HR: 87 (17 Nov 2021 20:40) (67 - 92)  BP: 137/89 (17 Nov 2021 20:40) (137/89 - 169/90)  BP(mean): --  RR: 18 (17 Nov 2021 20:40) (18 - 18)  SpO2: 95% (17 Nov 2021 20:40) (92% - 95%)   (if applicable)    Chest Tube (if applicable)    HEENT: Head is normocephalic and atraumatic. Extraocular muscles are intact. Mucous membranes are moist.     NECK: Supple, no palpable adenopathy.    LUNGS: Clear to auscultation, no wheezing, rales, or rhonchi.    HEART: Regular rate and rhythm without murmur.    ABDOMEN: Soft, nontender, and nondistended.  No hepatosplenomegaly is noted.    : No painful voiding, no pelvic pain    EXTREMITIES: Without any cyanosis, clubbing, rash, lesions or edema.    NEUROLOGIC: Awake, alert, oriented, grossly intact    SKIN: Warm, dry, good turgor.      LABS:                        14.3   7.26  )-----------( 305      ( 17 Nov 2021 09:01 )             45.0     11-17    138  |  99  |  16  ----------------------------<  162<H>  4.1   |  35<H>  |  0.78    Ca    8.6      17 Nov 2021 09:01  Phos  4.2     11-17  Mg     2.1     11-17    TPro  7.7  /  Alb  2.9<L>  /  TBili  0.4  /  DBili  x   /  AST  15  /  ALT  25  /  AlkPhos  144<H>  11-17                        MICROBIOLOGY: (if applicable)    RADIOLOGY & ADDITIONAL STUDIES:  EKG:   CXR:  ECHO:    IMPRESSION: 89y Female PAST MEDICAL & SURGICAL HISTORY:  History of hypertension    Arthritis    Legally blind    Pre-diabetes    Breast cancer  right    No significant past surgical history     p/w         89 Y/O Female presented to ED with productive cough with white sputum and  wheezing  x 1 week . Mild shortness of breadth , cough and SOB on exertion with Left apical 1.5 cm ill-defined opacity . Scattered tracheobronchial secretions . 11-12-21 Negative Covid 19 PCR . Symptoms due to acute bronchitis . Elevated troponin due to Type -2 MI . Pat is afebrile and with normal WBC     Suggestion:  -d/c antibx since pat afebrile , normal WBC and neg cultures  -continue albuterol inhaler   -change solumedrol to prednisone  20 mg x 5 days   -OOB to chair   -DVT / GI Prophy

## 2021-11-18 ENCOUNTER — TRANSCRIPTION ENCOUNTER (OUTPATIENT)
Age: 86
End: 2021-11-18

## 2021-11-18 VITALS — DIASTOLIC BLOOD PRESSURE: 88 MMHG | SYSTOLIC BLOOD PRESSURE: 157 MMHG | HEART RATE: 71 BPM | OXYGEN SATURATION: 95 %

## 2021-11-18 DIAGNOSIS — R91.1 SOLITARY PULMONARY NODULE: ICD-10-CM

## 2021-11-18 LAB — GLUCOSE BLDC GLUCOMTR-MCNC: 144 MG/DL — HIGH (ref 70–99)

## 2021-11-18 PROCEDURE — 83690 ASSAY OF LIPASE: CPT

## 2021-11-18 PROCEDURE — 82962 GLUCOSE BLOOD TEST: CPT

## 2021-11-18 PROCEDURE — 90662 IIV NO PRSV INCREASED AG IM: CPT

## 2021-11-18 PROCEDURE — 83880 ASSAY OF NATRIURETIC PEPTIDE: CPT

## 2021-11-18 PROCEDURE — 85610 PROTHROMBIN TIME: CPT

## 2021-11-18 PROCEDURE — 85730 THROMBOPLASTIN TIME PARTIAL: CPT

## 2021-11-18 PROCEDURE — 94640 AIRWAY INHALATION TREATMENT: CPT

## 2021-11-18 PROCEDURE — 96374 THER/PROPH/DIAG INJ IV PUSH: CPT

## 2021-11-18 PROCEDURE — 0225U NFCT DS DNA&RNA 21 SARSCOV2: CPT

## 2021-11-18 PROCEDURE — 97162 PT EVAL MOD COMPLEX 30 MIN: CPT

## 2021-11-18 PROCEDURE — 84484 ASSAY OF TROPONIN QUANT: CPT

## 2021-11-18 PROCEDURE — 71250 CT THORAX DX C-: CPT | Mod: MA

## 2021-11-18 PROCEDURE — 99285 EMERGENCY DEPT VISIT HI MDM: CPT

## 2021-11-18 PROCEDURE — 87635 SARS-COV-2 COVID-19 AMP PRB: CPT

## 2021-11-18 PROCEDURE — 85025 COMPLETE CBC W/AUTO DIFF WBC: CPT

## 2021-11-18 PROCEDURE — 83036 HEMOGLOBIN GLYCOSYLATED A1C: CPT

## 2021-11-18 PROCEDURE — 93005 ELECTROCARDIOGRAM TRACING: CPT

## 2021-11-18 PROCEDURE — 80053 COMPREHEN METABOLIC PANEL: CPT

## 2021-11-18 PROCEDURE — 87040 BLOOD CULTURE FOR BACTERIA: CPT

## 2021-11-18 PROCEDURE — 93306 TTE W/DOPPLER COMPLETE: CPT

## 2021-11-18 PROCEDURE — 85027 COMPLETE CBC AUTOMATED: CPT

## 2021-11-18 PROCEDURE — 86769 SARS-COV-2 COVID-19 ANTIBODY: CPT

## 2021-11-18 PROCEDURE — 84100 ASSAY OF PHOSPHORUS: CPT

## 2021-11-18 PROCEDURE — 71045 X-RAY EXAM CHEST 1 VIEW: CPT

## 2021-11-18 PROCEDURE — 36415 COLL VENOUS BLD VENIPUNCTURE: CPT

## 2021-11-18 PROCEDURE — 83735 ASSAY OF MAGNESIUM: CPT

## 2021-11-18 RX ORDER — IPRATROPIUM/ALBUTEROL SULFATE 18-103MCG
3 AEROSOL WITH ADAPTER (GRAM) INHALATION
Qty: 360 | Refills: 0
Start: 2021-11-18 | End: 2021-12-17

## 2021-11-18 RX ORDER — APIXABAN 2.5 MG/1
1 TABLET, FILM COATED ORAL
Qty: 60 | Refills: 0
Start: 2021-11-18 | End: 2021-12-17

## 2021-11-18 RX ORDER — FAMOTIDINE 10 MG/ML
1 INJECTION INTRAVENOUS
Qty: 10 | Refills: 0
Start: 2021-11-18 | End: 2021-11-27

## 2021-11-18 RX ADMIN — Medication 100 MILLIGRAM(S): at 06:16

## 2021-11-18 RX ADMIN — Medication 40 MILLIGRAM(S): at 06:16

## 2021-11-18 RX ADMIN — APIXABAN 5 MILLIGRAM(S): 2.5 TABLET, FILM COATED ORAL at 06:16

## 2021-11-18 RX ADMIN — INFLUENZA VIRUS VACCINE 0.7 MILLILITER(S): 15; 15; 15; 15 SUSPENSION INTRAMUSCULAR at 06:24

## 2021-11-18 RX ADMIN — Medication 20 MILLIGRAM(S): at 06:16

## 2021-11-18 RX ADMIN — CARVEDILOL PHOSPHATE 6.25 MILLIGRAM(S): 80 CAPSULE, EXTENDED RELEASE ORAL at 06:16

## 2021-11-18 NOTE — DISCHARGE NOTE PROVIDER - NSDCMRMEDTOKEN_GEN_ALL_CORE_FT
anastrozole 1 mg oral tablet: 1 tab(s) orally once a day  Coreg 6.25 mg oral tablet: 1 tab(s) orally 2 times a day  Eliquis 5 mg oral tablet: 1 tab(s) orally every 12 hours  furosemide 40 mg oral tablet: 1 tab(s) orally once a day  ipratropium-albuterol 0.5 mg-2.5 mg/3 mL inhalation solution: 3 milliliter(s) inhaled every 6 hours, As needed, Shortness of Breath and/or Wheezing  predniSONE 20 mg oral tablet: 1 tab(s) orally once a day  Zocor 20 mg oral tablet: 1 tab(s) orally once a day (at bedtime)   anastrozole 1 mg oral tablet: 1 tab(s) orally once a day  Coreg 6.25 mg oral tablet: 1 tab(s) orally 2 times a day  Eliquis 5 mg oral tablet: 1 tab(s) orally every 12 hours  furosemide 40 mg oral tablet: 1 tab(s) orally once a day  ipratropium-albuterol 0.5 mg-2.5 mg/3 mL inhalation solution: 3 milliliter(s) inhaled every 6 hours, As needed, Shortness of Breath and/or Wheezing  Pepcid 40 mg oral tablet: 1 tab(s) orally once a day   predniSONE 20 mg oral tablet: 1 tab(s) orally once a day  Zocor 20 mg oral tablet: 1 tab(s) orally once a day (at bedtime)

## 2021-11-18 NOTE — DISCHARGE NOTE NURSING/CASE MANAGEMENT/SOCIAL WORK - NSTOBACCONEVERSMOKERY/N_GEN_A
"When opening a documentation only encounter, be sure to enter in \"Chief Complaint\" Forms and in \" Comments\" Title of form, description if needed.    Anca is a 89 year old  female  Form received via: Fax  Form now resides in: Provider Ready    Tala Beaver CMA                Form has been completed by provider.     Form sent out via: Fax to [phone]  Patient informed: No, Reason:fax confirmation confirmed  Output date: June 7, 2018    Tala Beaver CMA      **Please close the encounter**      "
No

## 2021-11-18 NOTE — DISCHARGE NOTE PROVIDER - HOSPITAL COURSE
HPI:  89 yo F from home, lives with granddaughter, has HHA, legally blind , walks with cane , ambulation limited due to blindness and OA, Pmhx of breast cancer, Afib, HFpEF, HTN, DVT on Eliquis came with cough and wheezing . Pt stated that she started having wheezes 1 week ago due to cold associated with productive cough (white color mucus) and mild shortness of breadth at rest.  She denied any runny nose, hemoptysis, smoking, seasonal allergies, post nasal dripping, sore throat, fevers, chest pain, N/V/D.     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 Pt was also started on steroid to help and albulterol inhaler was also prescribed which helped pts symptoms. Pt continue to improve while in the hospital. PT evaluated the pt and recommended that pt go home with home PT.   Pt was also evaluated by cariology for the SOB and was cleared for discharge as no cardiologic cause was found.   Patient is stable for discharge. Patient has been advised to follow up as outpatient. Case has been discussed with the attending. This is just a summary of the case. For further information, please refer to patient chart document. HPI:  89 yo F from home, lives with granddaughter, has HHA, legally blind , walks with cane , ambulation limited due to blindness and OA, Pmhx of breast cancer, Afib, HFpEF, HTN, DVT on Eliquis came with cough and wheezing . Pt stated that she started having wheezes 1 week ago due to cold associated with productive cough (white color mucus) and mild shortness of breadth at rest.  She denied any runny nose, hemoptysis, smoking, seasonal allergies, post nasal dripping, sore throat, fevers, chest pain, N/V/D.     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 Pt was also started on steroid to help and albulterol inhaler was also prescribed which helped pts symptoms. Pt was found to have a pulmonary nodule on CT. The Nodule has increased in size as per the read. Pulmonary aware and stated no intervention at this point. f/u Outpt. Pt continue to improve while in the hospital. PT evaluated the pt and recommended that pt go home with home PT.   Pt was also evaluated by cariology for the SOB and was cleared for discharge as no cardiologic cause was found.   Patient is stable for discharge. Patient has been advised to follow up as outpatient. Case has been discussed with the attending. This is just a summary of the case. For further information, please refer to patient chart document.

## 2021-11-18 NOTE — DISCHARGE NOTE NURSING/CASE MANAGEMENT/SOCIAL WORK - NSDCVIVACCINE_GEN_ALL_CORE_FT
influenza, injectable, quadrivalent, preservative free; 27-Oct-2017 14:11; Brandy Joaquin (RN); [employer]; [id number]; IntraMuscular; Deltoid Left.; 0.5 milliLiter(s); VIS (VIS Published: 07-Aug-2015, VIS Presented: 27-Oct-2017);   influenza, high-dose, quadrivalent; 18-Nov-2021 06:24; Yemi Santana (WINTER); [employer]; [id number] (Exp. Date: 30-Jun-2022); IntraMuscular; Deltoid Left.; 0.7 milliLiter(s); VIS (VIS Published: 06-Aug-2021, VIS Presented: 18-Nov-2021);

## 2021-11-18 NOTE — DISCHARGE NOTE PROVIDER - NSDCCPCAREPLAN_GEN_ALL_CORE_FT
PRINCIPAL DISCHARGE DIAGNOSIS  Diagnosis: Acute bronchospasm  Assessment and Plan of Treatment: You came into the hospital with shortness of breath. We had you see a pulmonoligist who started you on steroids and inhalers to help open up the lungs to help improve your breathing. Please take the steroids for 3 more days and uset the inhaler as prescribed. Please follow up with your primary care doctor within 2 weeks of discharge.        SECONDARY DISCHARGE DIAGNOSES  Diagnosis: Hypertension  Assessment and Plan of Treatment: You have high blood pressure. Please continue to taking your medications as prescribed. Please measure your blood pressure at least once daily. Maintain a healthy diet which includes incorporating more vegetables and decreasing the amount of salt (low sodium) and sugar in your diet such as rice, bread, and pasta low sugar, low fat, low sodium diet. Exercise frequently if possible.  Please follow up with your primary care provider within one week of your discharge.      Diagnosis: Afib  Assessment and Plan of Treatment: You have a history of irregular rhythym. Please continue to take your blood thinner Eliquis 5mg 2x a day, and your heart medication metoprolol 6.25 2x a day as prescribed. Please continue to follow up with cardiologist when you get discharged. Please follow up with your primary care doctor within 2 weeks of discharge.       PRINCIPAL DISCHARGE DIAGNOSIS  Diagnosis: Acute bronchospasm  Assessment and Plan of Treatment: You came into the hospital with shortness of breath. We had you see a pulmonoligist who started you on steroids and inhalers to help open up the lungs to help improve your breathing. Please take the steroids for 3 more days and uset the inhaler as prescribed. Please follow up with your primary care doctor within 2 weeks of discharge.        SECONDARY DISCHARGE DIAGNOSES  Diagnosis: Hypertension  Assessment and Plan of Treatment: You have high blood pressure. Please continue to taking your medications as prescribed. Please measure your blood pressure at least once daily. Maintain a healthy diet which includes incorporating more vegetables and decreasing the amount of salt (low sodium) and sugar in your diet such as rice, bread, and pasta low sugar, low fat, low sodium diet. Exercise frequently if possible.  Please follow up with your primary care provider within one week of your discharge.      Diagnosis: Afib  Assessment and Plan of Treatment: You have a history of irregular rhythym. Please continue to take your blood thinner Eliquis 5mg 2x a day, and your heart medication metoprolol 6.25 2x a day as prescribed. Please continue to follow up with cardiologist when you get discharged. Please follow up with your primary care doctor within 2 weeks of discharge.      Diagnosis: Pulmonary nodule  Assessment and Plan of Treatment: Pulmonary aware and stated no intervention at this point. f/u Outpt. Pt continue to improve while in the hospital. PT evaluated the pt and recommended that pt go home with home PT.

## 2021-11-18 NOTE — DISCHARGE NOTE NURSING/CASE MANAGEMENT/SOCIAL WORK - PATIENT PORTAL LINK FT
You can access the FollowMyHealth Patient Portal offered by [facility] by registering at the following website: [email or web address]. By joining [employer]’s FollowMyHealth portal, you will also be able to view your health information using other applications (apps) compatible with our system.

## 2021-11-18 NOTE — PROGRESS NOTE ADULT - REASON FOR ADMISSION
Shortness of breadth

## 2021-11-18 NOTE — DISCHARGE NOTE PROVIDER - CARE PROVIDER_API CALL
Sylvia Tinsley)  Internal Medicine  [address] [facility] [facility], [address]  [place] [address]  Phone: [phone]  Fax: [phone]  Follow Up Time:     Kristine Siu  INTERNAL MEDICINE  [address] [place] [address]  Phone: [phone]  Fax: [phone]  Follow Up Time:     Ayden Bianchi)  Medicine  [address] [place] [address], [address]  [place] [address]  Phone: [phone]  Fax: [phone]  Follow Up Time:

## 2021-11-18 NOTE — PROGRESS NOTE ADULT - SUBJECTIVE AND OBJECTIVE BOX
CHIEF COMPLAINT:Patient is a 89y old  Female who presents with a chief complaint of Shortness of breadth .pt appears comfortable.    	  REVIEW OF SYSTEMS:  CONSTITUTIONAL: No fever, weight loss, or fatigue  EYES: No eye pain, visual disturbances, or discharge  ENT:  No difficulty hearing, tinnitus, vertigo; No sinus or throat pain  NECK: No pain or stiffness  RESPIRATORY: No cough, wheezing, chills or hemoptysis; No Shortness of Breath  CARDIOVASCULAR: No chest pain, palpitations, passing out, dizziness, or leg swelling  GASTROINTESTINAL: No abdominal or epigastric pain. No nausea, vomiting, or hematemesis; No diarrhea or constipation. No melena or hematochezia.  GENITOURINARY: No dysuria, frequency, hematuria, or incontinence  NEUROLOGICAL: No headaches, memory loss, loss of strength, numbness, or tremors  SKIN: No itching, burning, rashes, or lesions   LYMPH Nodes: No enlarged glands  ENDOCRINE: No heat or cold intolerance; No hair loss  MUSCULOSKELETAL: No joint pain or swelling; No muscle, back, or extremity pain  PSYCHIATRIC: No depression, anxiety, mood swings, or difficulty sleeping  HEME/LYMPH: No easy bruising, or bleeding gums  ALLERGY AND IMMUNOLOGIC: No hives or eczema	        PHYSICAL EXAM:  T(C): 36.6 (11-18-21 @ 07:50), Max: 36.9 (11-18-21 @ 05:22)  HR: 70 (11-18-21 @ 07:50) (70 - 92)  BP: 162/90 (11-18-21 @ 07:50) (132/89 - 165/87)  RR: 19 (11-18-21 @ 07:50) (18 - 19)  SpO2: 94% (11-18-21 @ 07:50) (93% - 95%)  Wt(kg): --  I&O's Summary    17 Nov 2021 07:01  -  18 Nov 2021 07:00  --------------------------------------------------------  IN: 0 mL / OUT: 850 mL / NET: -850 mL        Appearance: Normal	  HEENT:   Normal oral mucosa, PERRL, EOMI	  Lymphatic: No lymphadenopathy  Cardiovascular: Normal S1 S2, No JVD, No murmurs, No edema  Respiratory: Lungs clear to auscultation	  Psychiatry: A & O x 3, Mood & affect appropriate  Gastrointestinal:  Soft, Non-tender, + BS	  Skin: No rashes, No ecchymoses, No cyanosis	  Neurologic: Non-focal  Extremities: Normal range of motion, No clubbing, cyanosis or edema  Vascular: Peripheral pulses palpable 2+ bilaterally    MEDICATIONS  (STANDING):  anastrozole 1 milliGRAM(s) Oral daily  apixaban 5 milliGRAM(s) Oral every 12 hours  carvedilol 6.25 milliGRAM(s) Oral every 12 hours  dextrose 50% Injectable 25 Gram(s) IV Push once  furosemide    Tablet 40 milliGRAM(s) Oral daily  glucagon  Injectable 1 milliGRAM(s) IntraMuscular once  insulin lispro (ADMELOG) corrective regimen sliding scale   SubCutaneous three times a day before meals  predniSONE   Tablet 20 milliGRAM(s) Oral daily  simvastatin 20 milliGRAM(s) Oral at bedtime    LABS:	 	                      14.3   7.26  )-----------( 305      ( 17 Nov 2021 09:01 )             45.0     11-17    138  |  99  |  16  ----------------------------<  162<H>  4.1   |  35<H>  |  0.78    Ca    8.6      17 Nov 2021 09:01  Phos  4.2     11-17  Mg     2.1     11-17    TPro  7.7  /  Alb  2.9<L>  /  TBili  0.4  /  DBili  x   /  AST  15  /  ALT  25  /  AlkPhos  144<H>  11-17    proBNP: Serum Pro-Brain Natriuretic Peptide: 750 pg/mL (11-14 @ 16:33)    Lipid Profile:   HgA1c:   TSH:

## 2021-11-18 NOTE — PROGRESS NOTE ADULT - PROVIDER SPECIALTY LIST ADULT
Cardiology
Cardiology
Pulmonology
Cardiology
Internal Medicine

## 2021-11-18 NOTE — PROGRESS NOTE ADULT - SUBJECTIVE AND OBJECTIVE BOX
Time of Visit:  Patient seen and examined.     MEDICATIONS  (STANDING):  anastrozole 1 milliGRAM(s) Oral daily  apixaban 5 milliGRAM(s) Oral every 12 hours  carvedilol 6.25 milliGRAM(s) Oral every 12 hours  dextrose 50% Injectable 25 Gram(s) IV Push once  furosemide    Tablet 40 milliGRAM(s) Oral daily  glucagon  Injectable 1 milliGRAM(s) IntraMuscular once  insulin lispro (ADMELOG) corrective regimen sliding scale   SubCutaneous three times a day before meals  predniSONE   Tablet 20 milliGRAM(s) Oral daily  simvastatin 20 milliGRAM(s) Oral at bedtime      MEDICATIONS  (PRN):  acetaminophen     Tablet .. 650 milliGRAM(s) Oral every 6 hours PRN Temp greater or equal to 38C (100.4F), Mild Pain (1 - 3)  albuterol/ipratropium for Nebulization 3 milliLiter(s) Nebulizer every 6 hours PRN Shortness of Breath and/or Wheezing  guaiFENesin Oral Liquid (Sugar-Free) 100 milliGRAM(s) Oral every 6 hours PRN Cough       Medications up to date at time of exam.    ROS: No fever, chills, cough, congestion on exam. Denies SOB.   PHYSICAL EXAMINATION:  Vital Signs Last 24 Hrs  T(C): 36.6 (18 Nov 2021 07:50), Max: 36.9 (18 Nov 2021 05:22)  T(F): 97.8 (18 Nov 2021 07:50), Max: 98.5 (18 Nov 2021 05:22)  HR: 70 (18 Nov 2021 07:50) (70 - 92)  BP: 162/90 (18 Nov 2021 07:50) (132/89 - 165/87)  BP(mean): --  RR: 19 (18 Nov 2021 07:50) (18 - 19)  SpO2: 94% (18 Nov 2021 07:50) (93% - 95%)   (if applicable)    General : Alert and oriented. Able to answer question with no SOB. No acute distress.     HEENT: Head is normocephalic and atraumatic. Extraocular muscles are intact. Mucous membranes are moist.     NECK: Supple, no palpable adenopathy.    LUNGS: Clear to auscultation bilaterally with  no wheezing, rales, or rhonchi. No use of accessory muscle.     HEART: S1 S2 Regular rate and no click/ rub.     ABDOMEN: Soft, nontender, and nondistended.  No hepatosplenomegaly is noted. Active bowel sounds.     ; No bladder distention and tenderness.     NEUROLOGIC: Awake, alert, oriented.     SKIN: Warm and moist . Non diaphoretic.       LABS:                        14.3   7.26  )-----------( 305      ( 17 Nov 2021 09:01 )             45.0     11-17    138  |  99  |  16  ----------------------------<  162<H>  4.1   |  35<H>  |  0.78    Ca    8.6      17 Nov 2021 09:01  Phos  4.2     11-17  Mg     2.1     11-17    TPro  7.7  /  Alb  2.9<L>  /  TBili  0.4  /  DBili  x   /  AST  15  /  ALT  25  /  AlkPhos  144<H>  11-17    MICROBIOLOGY: (if applicable)    RADIOLOGY & ADDITIONAL STUDIES:  EKG:   CXR:  ECHO:    IMPRESSION: 89y Female PAST MEDICAL & SURGICAL HISTORY:  History of hypertension    Arthritis    Legally blind    Pre-diabetes    Breast cancer  right    No significant past surgical history    Impression: 89 Y/O Female presented to ED with productive cough with white sputum and  wheezing  x 1 week . Mild shortness of breadth , cough and SOB on exertion with Left apical 1.5 cm ill-defined opacity . Scattered tracheobronchial secretions . 11-15-21, 11-12-21 Negative Covid 19 PCR . Symptoms due to acute bronchitis . Elevated troponin due to Type -2 MI     Suggestion:  O2 saturation ranges 94%-96% room air. So far saturating good room air. No need for portable continuous Oxygen supplementation outpatient.   Outpatient PFT .    Continue PRN Albuterol Q 6 hours .  Continue Prednisone 20 mg oral daily x 5 Days.   Pulmonary follow up outpatient .

## 2021-11-18 NOTE — DISCHARGE NOTE PROVIDER - PROVIDER TOKENS
PROVIDER:[TOKEN:[[id number]]],PROVIDER:[TOKEN:[[id number]]],PROVIDER:[TOKEN:[[id number]]]

## 2021-11-18 NOTE — PROGRESS NOTE ADULT - ASSESSMENT
89 yo F from home, lives with granddaughter, has HHA, legally blind , walks with cane , ambulation limited due to blindness and OA, Pmhx of breast cancer, Afib, HFpEF, HTN, DVT on Eliquis came with cough and wheezing due to acute bronchitis.  1.Acute bronchitis-off ABX as  per pulm.  2.Breast ca-anastrozole.  3.Lung lesion-Pulm f/u.  4.DVT and afib-eliquis,  5.Sleep study as outpatient-R/O MARGARITA.  6.GI prophylaxis.  
89 yo F from home, lives with granddaughter, has HHA, legally blind , walks with cane , ambulation limited due to blindness and OA, Pmhx of breast cancer, Afib, HFpEF, HTN, DVT on Eliquis came with cough and wheezing due to acute bronchitis.  1.D/C tele.  2.Echocardiogram.  3.Acute bronchitis-ABX.  4.Breast ca-anastrozole.  5.Lung lesion-Pulm f/u.  6.DVT and afib-eliquis,  7.Sleep study as outpatient-R/O MARGARITA.  8.GI prophylaxis.  
89 yo F from home, lives with granddaughter, has HHA, legally blind , walks with cane , ambulation limited due to blindness and OA, Pmhx of breast cancer, Afib, HFpEF, HTN, DVT on Eliquis came with cough and wheezing due to acute bronchitis.  1.D/C tele.  2.Acute bronchitis-off ABX as  per pulm.  4.Breast ca-anastrozole.  5.Lung lesion-Pulm f/u.  6.DVT and afib-eliquis,  7.Sleep study as outpatient-R/O MARGARITA.  8.GI prophylaxis.  
89 yo F from home, lives with granddaughter, has HHA, legally blind , walks with cane , ambulation limited due to blindness and OA, Pmhx of breast cancer, Afib, HFpEF, HTN, DVT on Eliquis came with cough and wheezing . Admitted for further work up 
89 yo F from home, lives with granddaughter, has HHA, legally blind , walks with cane , ambulation limited due to blindness and OA, Pmhx of breast cancer, Afib, HFpEF, HTN, DVT on Eliquis came with cough and wheezing . Admitted for further work up   Rehab Pending 
89 yo F from home, lives with granddaughter, has HHA, legally blind , walks with cane , ambulation limited due to blindness and OA, Pmhx of breast cancer, Afib, HFpEF, HTN, DVT on Eliquis came with cough and wheezing . Admitted for further work up 
89 yo F from home, lives with granddaughter, has HHA, legally blind , walks with cane , ambulation limited due to blindness and OA, Pmhx of breast cancer, Afib, HFpEF, HTN, DVT on Eliquis came with cough and wheezing . Admitted for further work up 
89 yo F from home, lives with granddaughter, has HHA, legally blind , walks with cane , ambulation limited due to blindness and OA, Pmhx of breast cancer, Afib, HFpEF, HTN, DVT on Eliquis came with cough and wheezing . Admitted for further work up   Rehab Pending

## 2022-01-26 NOTE — ED PROVIDER NOTE - PRO INTERPRETER NEED 2
[address]  [place] [address]  Phone: [phone]  Fax: [phone]          Return to Work/School    Patient: Danica Fernandez  YOB: 1969   Date: 01/26/2022     To Whom It May Concern:     Danica Fernandez was in contact with/seen in my office on 01/26/2022. COVID-19 is present in our communities across the [facility]. There is limited testing for COVID at this time, so not all patients can be tested. In this situation, your employee meets the following criteria:     Danica Fernandez has met the criteria for COVID-19 testing and has a POSITIVE result. She can return to work once they are asymptomatic for 24 hours without the use of fever reducing medications AND at least five days from the start of symptoms (or from the first positive result if they have no symptoms).      If you have any questions or concerns, or if I can be of further assistance, please do not hesitate to contact me.     Sincerely,      DORENE Johnson   English

## 2022-05-03 NOTE — ED ADULT NURSE NOTE - CAS DISCH TRANSFER METHOD
[de-identified] : CC: Follow up of Hypertension and other medical problems\par \par HPI:\par This is a 62 year male being seen for evaluation of:\par \par Hypertension:\par Patient is taking his medication properly and is not having any side effects.\par 
Private car

## 2022-07-28 ENCOUNTER — INPATIENT (INPATIENT)
Facility: HOSPITAL | Age: 87
LOS: 6 days | Discharge: HOME | DRG: 193 | End: 2022-08-04
Attending: INTERNAL MEDICINE | Admitting: INTERNAL MEDICINE
Payer: MEDICARE

## 2022-07-28 VITALS
HEIGHT: 70 IN | SYSTOLIC BLOOD PRESSURE: 116 MMHG | OXYGEN SATURATION: 98 % | TEMPERATURE: 98 F | RESPIRATION RATE: 20 BRPM | HEART RATE: 77 BPM | DIASTOLIC BLOOD PRESSURE: 79 MMHG

## 2022-07-28 DIAGNOSIS — E11.9 TYPE 2 DIABETES MELLITUS WITHOUT COMPLICATIONS: ICD-10-CM

## 2022-07-28 DIAGNOSIS — J44.1 CHRONIC OBSTRUCTIVE PULMONARY DISEASE WITH (ACUTE) EXACERBATION: ICD-10-CM

## 2022-07-28 DIAGNOSIS — C50.919 MALIGNANT NEOPLASM OF UNSPECIFIED SITE OF UNSPECIFIED FEMALE BREAST: ICD-10-CM

## 2022-07-28 DIAGNOSIS — I50.33 ACUTE ON CHRONIC DIASTOLIC (CONGESTIVE) HEART FAILURE: ICD-10-CM

## 2022-07-28 DIAGNOSIS — J96.01 ACUTE RESPIRATORY FAILURE WITH HYPOXIA: ICD-10-CM

## 2022-07-28 DIAGNOSIS — R06.02 SHORTNESS OF BREATH: ICD-10-CM

## 2022-07-28 DIAGNOSIS — I48.91 UNSPECIFIED ATRIAL FIBRILLATION: ICD-10-CM

## 2022-07-28 DIAGNOSIS — Z29.9 ENCOUNTER FOR PROPHYLACTIC MEASURES, UNSPECIFIED: ICD-10-CM

## 2022-07-28 DIAGNOSIS — I50.9 HEART FAILURE, UNSPECIFIED: ICD-10-CM

## 2022-07-28 DIAGNOSIS — Z87.09 PERSONAL HISTORY OF OTHER DISEASES OF THE RESPIRATORY SYSTEM: ICD-10-CM

## 2022-07-28 LAB
ALBUMIN SERPL ELPH-MCNC: 3.3 G/DL — LOW (ref 3.5–5)
ALP SERPL-CCNC: 142 U/L — HIGH (ref 40–120)
ALT FLD-CCNC: 31 U/L DA — SIGNIFICANT CHANGE UP (ref 10–60)
ANION GAP SERPL CALC-SCNC: 0 MMOL/L — LOW (ref 5–17)
AST SERPL-CCNC: 25 U/L — SIGNIFICANT CHANGE UP (ref 10–40)
BASOPHILS # BLD AUTO: 0.05 K/UL — SIGNIFICANT CHANGE UP (ref 0–0.2)
BASOPHILS NFR BLD AUTO: 0.8 % — SIGNIFICANT CHANGE UP (ref 0–2)
BILIRUB SERPL-MCNC: 0.6 MG/DL — SIGNIFICANT CHANGE UP (ref 0.2–1.2)
BUN SERPL-MCNC: 15 MG/DL — SIGNIFICANT CHANGE UP (ref 7–18)
CALCIUM SERPL-MCNC: 8.8 MG/DL — SIGNIFICANT CHANGE UP (ref 8.4–10.5)
CHLORIDE SERPL-SCNC: 101 MMOL/L — SIGNIFICANT CHANGE UP (ref 96–108)
CO2 SERPL-SCNC: 35 MMOL/L — HIGH (ref 22–31)
CREAT SERPL-MCNC: 1.01 MG/DL — SIGNIFICANT CHANGE UP (ref 0.5–1.3)
EGFR: 53 ML/MIN/1.73M2 — LOW
EOSINOPHIL # BLD AUTO: 0.48 K/UL — SIGNIFICANT CHANGE UP (ref 0–0.5)
EOSINOPHIL NFR BLD AUTO: 7.6 % — HIGH (ref 0–6)
GLUCOSE SERPL-MCNC: 131 MG/DL — HIGH (ref 70–99)
HCT VFR BLD CALC: 42.6 % — SIGNIFICANT CHANGE UP (ref 34.5–45)
HGB BLD-MCNC: 13.1 G/DL — SIGNIFICANT CHANGE UP (ref 11.5–15.5)
IMM GRANULOCYTES NFR BLD AUTO: 0.2 % — SIGNIFICANT CHANGE UP (ref 0–1.5)
LYMPHOCYTES # BLD AUTO: 1.61 K/UL — SIGNIFICANT CHANGE UP (ref 1–3.3)
LYMPHOCYTES # BLD AUTO: 25.6 % — SIGNIFICANT CHANGE UP (ref 13–44)
MCHC RBC-ENTMCNC: 30.8 GM/DL — LOW (ref 32–36)
MCHC RBC-ENTMCNC: 31.5 PG — SIGNIFICANT CHANGE UP (ref 27–34)
MCV RBC AUTO: 102.4 FL — HIGH (ref 80–100)
MONOCYTES # BLD AUTO: 0.66 K/UL — SIGNIFICANT CHANGE UP (ref 0–0.9)
MONOCYTES NFR BLD AUTO: 10.5 % — SIGNIFICANT CHANGE UP (ref 2–14)
NEUTROPHILS # BLD AUTO: 3.47 K/UL — SIGNIFICANT CHANGE UP (ref 1.8–7.4)
NEUTROPHILS NFR BLD AUTO: 55.3 % — SIGNIFICANT CHANGE UP (ref 43–77)
NRBC # BLD: 0 /100 WBCS — SIGNIFICANT CHANGE UP (ref 0–0)
NT-PROBNP SERPL-SCNC: 2324 PG/ML — HIGH (ref 0–450)
PLATELET # BLD AUTO: 229 K/UL — SIGNIFICANT CHANGE UP (ref 150–400)
POTASSIUM SERPL-MCNC: 4.7 MMOL/L — SIGNIFICANT CHANGE UP (ref 3.5–5.3)
POTASSIUM SERPL-SCNC: 4.7 MMOL/L — SIGNIFICANT CHANGE UP (ref 3.5–5.3)
PROT SERPL-MCNC: 7.3 G/DL — SIGNIFICANT CHANGE UP (ref 6–8.3)
RAPID RVP RESULT: SIGNIFICANT CHANGE UP
RBC # BLD: 4.16 M/UL — SIGNIFICANT CHANGE UP (ref 3.8–5.2)
RBC # FLD: 13.6 % — SIGNIFICANT CHANGE UP (ref 10.3–14.5)
SARS-COV-2 RNA SPEC QL NAA+PROBE: SIGNIFICANT CHANGE UP
SODIUM SERPL-SCNC: 136 MMOL/L — SIGNIFICANT CHANGE UP (ref 135–145)
TROPONIN I, HIGH SENSITIVITY RESULT: 197.5 NG/L — HIGH
TROPONIN I, HIGH SENSITIVITY RESULT: 200.5 NG/L — HIGH
WBC # BLD: 6.28 K/UL — SIGNIFICANT CHANGE UP (ref 3.8–10.5)
WBC # FLD AUTO: 6.28 K/UL — SIGNIFICANT CHANGE UP (ref 3.8–10.5)

## 2022-07-28 PROCEDURE — 71045 X-RAY EXAM CHEST 1 VIEW: CPT | Mod: 26

## 2022-07-28 PROCEDURE — 99285 EMERGENCY DEPT VISIT HI MDM: CPT

## 2022-07-28 PROCEDURE — 71275 CT ANGIOGRAPHY CHEST: CPT | Mod: 26

## 2022-07-28 RX ORDER — CEFTRIAXONE 500 MG/1
INJECTION, POWDER, FOR SOLUTION INTRAMUSCULAR; INTRAVENOUS
Refills: 0 | Status: COMPLETED | OUTPATIENT
Start: 2022-07-28 | End: 2022-08-03

## 2022-07-28 RX ORDER — FERROUS SULFATE 325(65) MG
325 TABLET ORAL DAILY
Refills: 0 | Status: DISCONTINUED | OUTPATIENT
Start: 2022-07-28 | End: 2022-08-04

## 2022-07-28 RX ORDER — APIXABAN 2.5 MG/1
5 TABLET, FILM COATED ORAL
Refills: 0 | Status: DISCONTINUED | OUTPATIENT
Start: 2022-07-28 | End: 2022-08-04

## 2022-07-28 RX ORDER — CARVEDILOL PHOSPHATE 80 MG/1
6.25 CAPSULE, EXTENDED RELEASE ORAL EVERY 12 HOURS
Refills: 0 | Status: DISCONTINUED | OUTPATIENT
Start: 2022-07-28 | End: 2022-08-04

## 2022-07-28 RX ORDER — ALBUTEROL 90 UG/1
1 AEROSOL, METERED ORAL
Refills: 0 | Status: DISCONTINUED | OUTPATIENT
Start: 2022-07-28 | End: 2022-08-04

## 2022-07-28 RX ORDER — IPRATROPIUM/ALBUTEROL SULFATE 18-103MCG
3 AEROSOL WITH ADAPTER (GRAM) INHALATION
Refills: 0 | Status: COMPLETED | OUTPATIENT
Start: 2022-07-28 | End: 2022-07-28

## 2022-07-28 RX ORDER — FUROSEMIDE 40 MG
20 TABLET ORAL ONCE
Refills: 0 | Status: COMPLETED | OUTPATIENT
Start: 2022-07-28 | End: 2022-07-28

## 2022-07-28 RX ORDER — AZITHROMYCIN 500 MG/1
TABLET, FILM COATED ORAL
Refills: 0 | Status: DISCONTINUED | OUTPATIENT
Start: 2022-07-28 | End: 2022-08-03

## 2022-07-28 RX ORDER — AZITHROMYCIN 500 MG/1
500 TABLET, FILM COATED ORAL EVERY 24 HOURS
Refills: 0 | Status: DISCONTINUED | OUTPATIENT
Start: 2022-07-29 | End: 2022-08-03

## 2022-07-28 RX ORDER — AZITHROMYCIN 500 MG/1
500 TABLET, FILM COATED ORAL ONCE
Refills: 0 | Status: COMPLETED | OUTPATIENT
Start: 2022-07-28 | End: 2022-07-28

## 2022-07-28 RX ORDER — PANTOPRAZOLE SODIUM 20 MG/1
40 TABLET, DELAYED RELEASE ORAL
Refills: 0 | Status: DISCONTINUED | OUTPATIENT
Start: 2022-07-28 | End: 2022-08-04

## 2022-07-28 RX ORDER — FUROSEMIDE 40 MG
40 TABLET ORAL DAILY
Refills: 0 | Status: DISCONTINUED | OUTPATIENT
Start: 2022-07-29 | End: 2022-07-29

## 2022-07-28 RX ORDER — SIMVASTATIN 20 MG/1
20 TABLET, FILM COATED ORAL AT BEDTIME
Refills: 0 | Status: DISCONTINUED | OUTPATIENT
Start: 2022-07-28 | End: 2022-08-04

## 2022-07-28 RX ORDER — INSULIN LISPRO 100/ML
VIAL (ML) SUBCUTANEOUS
Refills: 0 | Status: DISCONTINUED | OUTPATIENT
Start: 2022-07-28 | End: 2022-07-29

## 2022-07-28 RX ORDER — MONTELUKAST 4 MG/1
10 TABLET, CHEWABLE ORAL DAILY
Refills: 0 | Status: DISCONTINUED | OUTPATIENT
Start: 2022-07-28 | End: 2022-08-04

## 2022-07-28 RX ORDER — ASPIRIN/CALCIUM CARB/MAGNESIUM 324 MG
81 TABLET ORAL DAILY
Refills: 0 | Status: DISCONTINUED | OUTPATIENT
Start: 2022-07-28 | End: 2022-08-04

## 2022-07-28 RX ORDER — CEFTRIAXONE 500 MG/1
1000 INJECTION, POWDER, FOR SOLUTION INTRAMUSCULAR; INTRAVENOUS EVERY 24 HOURS
Refills: 0 | Status: COMPLETED | OUTPATIENT
Start: 2022-07-29 | End: 2022-08-02

## 2022-07-28 RX ORDER — SIMVASTATIN 20 MG/1
1 TABLET, FILM COATED ORAL
Qty: 0 | Refills: 0 | DISCHARGE

## 2022-07-28 RX ORDER — CEFTRIAXONE 500 MG/1
1000 INJECTION, POWDER, FOR SOLUTION INTRAMUSCULAR; INTRAVENOUS ONCE
Refills: 0 | Status: COMPLETED | OUTPATIENT
Start: 2022-07-28 | End: 2022-07-28

## 2022-07-28 RX ADMIN — Medication 20 MILLIGRAM(S): at 18:51

## 2022-07-28 RX ADMIN — Medication 125 MILLIGRAM(S): at 15:56

## 2022-07-28 RX ADMIN — Medication 3 MILLILITER(S): at 16:28

## 2022-07-28 RX ADMIN — SIMVASTATIN 20 MILLIGRAM(S): 20 TABLET, FILM COATED ORAL at 23:49

## 2022-07-28 RX ADMIN — Medication 40 MILLIGRAM(S): at 23:49

## 2022-07-28 RX ADMIN — AZITHROMYCIN 500 MILLIGRAM(S): 500 TABLET, FILM COATED ORAL at 23:50

## 2022-07-28 RX ADMIN — Medication 20 MILLIGRAM(S): at 21:03

## 2022-07-28 RX ADMIN — Medication 3 MILLILITER(S): at 16:52

## 2022-07-28 RX ADMIN — CEFTRIAXONE 1000 MILLIGRAM(S): 500 INJECTION, POWDER, FOR SOLUTION INTRAMUSCULAR; INTRAVENOUS at 23:49

## 2022-07-28 RX ADMIN — Medication 3 MILLILITER(S): at 15:57

## 2022-07-28 RX ADMIN — ALBUTEROL 1 PUFF(S): 90 AEROSOL, METERED ORAL at 23:50

## 2022-07-28 NOTE — H&P ADULT - HISTORY OF PRESENT ILLNESS
Pt is a 89 yo F with PMHx of COPD, afib, HFpEF, prior DVT, DM, presenting with generalized weakness for 2 days. She attributed her weakness to the summer heat. She also endorses trouble breathing. This morning, her symptoms worsened, which prompted her to go to her PMD. She was found to be hypoxic at her PMD Dr. Bianchi to 77%. She endorses leg swelling, but states that her leg swelling has been on and off for many years. She denies chest pain, palpitations, cough, fever, nausea, vomiting. She lives with her children and grandchildren, uses a walker at home, and a wheelchair when she goes out of the house. She is on no oxygen at home. She reports no increase in use of her nebulizer in the fast few days. No recent sick contacts, or travel.     Pt. was accompanied by her granddaughter.    ED Vitals:  98.4 F, 118/79, 77 HR, 98% on 2L (rechecked her O2 saturation, 85% on RA, 91% on 3L) Pt is a 89 yo Female, from home, ambulates using walker/wheelchair, lives with grand-daughter, with PMHx of COPD (not on home oxygen), Afib on Eliquis, HFpEF (Echo Nov 2021 EF 55-60%), prior DVT, DM, presenting with generalized weakness for 2 days. Pt is a poor historian--She attributed her weakness to the summer heat. Says her weakness worsened this morning, her symptoms worsened, which prompted her to go to her PMD. She was found to be hypoxic at her PMD Dr. Bianchi to 77%. She endorses leg swelling, but states that her leg swelling has been on and off for many years. Grand-daughter at bedside endorses that medications are usually given by patient's daughter to the patient, pt may have missed a few doses of Lasix. Endorses that family tries their best to have a salt restricted/fluid restricted diet. Papers from PMD office shows that pt is on Lasix 20 mg daily, was on Lasix 40 mg last month--unsure why dose was changed, family and patient unsure of the dosage as well. Pt endorses no chest pain, cough, sputum production, palpitations, cough, fever, abdominal pain, constipation, diarrhea, nausea, vomiting. She reports no increase in use of her nebulizer in the fast few days. No recent sick contacts, or travel.     In the ED, Vitals were:  Temp 98.4 F, 118/79, 77 HR, 98% on 2L (rechecked her O2 saturation, 85% on RA, 91% on 3L)  EKG Afib, Trop 197.5  Cxray suggestive of fluid overload

## 2022-07-28 NOTE — ED PROVIDER NOTE - CLINICAL SUMMARY MEDICAL DECISION MAKING FREE TEXT BOX
89-year-old female hx of COPD, AFib, HFpEG, HTN, prior DVT, presenting with generalized weakness x 2 days, found to be hypoxic at PMD Dr. Bianchi's office to 77% -- possible COPD exacerbation from viral URI, vs pnuemonia or ACS - will check labs, CXR, provide nebs/steroids, reassess.

## 2022-07-28 NOTE — ED PROVIDER NOTE - PROGRESS NOTE DETAILS
Labs significant for BNP 2324, Trop 187 (stable from prior), CXR with evidence of overload  Lasix written  Will admit to Medicine

## 2022-07-28 NOTE — H&P ADULT - PARTICIPANTS
Discussed GOC with patient and patient's grand-daughter at bedside regarding GOC. Pt says in case of clinical deterioration and if CPR is warranted, she would want any and all measures to save her life. FULL CODE./Patient

## 2022-07-28 NOTE — PHARMACOTHERAPY INTERVENTION NOTE - COMMENTS
Patient's home pharmacy ([facility] on [address], [place] [phone]) on record was contacted and the Outside Medication Record was updated based on the pharmacy prescription history.

## 2022-07-28 NOTE — H&P ADULT - PROBLEM SELECTOR PLAN 4
Pt has hx of COPD, uses Albuterol inhaler PRN, Singulair, Nebs as needed at home  S/p Nebulizer treatment in ED, 125 mg IVP Solumedrol, pt still noted to have mild wheezing  Continue home inhalers  F/u CT chest Pt has hx of COPD, uses Albuterol inhaler PRN, Singulair, Nebs as needed at home  S/p Nebulizer treatment in ED, 125 mg IVP Solumedrol, pt still noted to have mild wheezing  Continue home inhalers  F/u CTA chest

## 2022-07-28 NOTE — H&P ADULT - NSICDXPASTMEDICALHX_GEN_ALL_CORE_FT
PAST MEDICAL HISTORY:  Arthritis     Atrial fibrillation     Breast cancer right    COPD exacerbation     HF (heart failure)     History of hypertension     Legally blind     Pre-diabetes

## 2022-07-28 NOTE — H&P ADULT - NSHPREVIEWOFSYSTEMS_GEN_ALL_CORE
REVIEW OF SYSTEMS:    CONSTITUTIONAL: Positive for weakness, no fevers or chills  EYES/ENT: No visual changes;  No vertigo or throat pain   NECK: No pain or stiffness  RESPIRATORY: Positive for wheezing and SOB. No cough, hemoptysis;   CARDIOVASCULAR: No chest pain or palpitations  GASTROINTESTINAL: No abdominal or epigastric pain. No nausea, vomiting, or hematemesis; No diarrhea or constipation. No melena or hematochezia.  GENITOURINARY: No dysuria, frequency or hematuria  NEUROLOGICAL: No numbness or weakness  SKIN: No itching, burning, rashes, or lesions   All other review of systems is negative unless indicated above.

## 2022-07-28 NOTE — ED ADULT NURSE NOTE - OBJECTIVE STATEMENT
pt is here for hypoxia.  As per family member, hypoxia at PCP office O2 sat =74%with RA, h/s blindness, A-fib, HTN, c/o b/l edema +4, a/ox3,

## 2022-07-28 NOTE — H&P ADULT - ASSESSMENT
Pt is a 89 yo F with PMHx of COPD, afib, HFpEF, prior DVT, DM, presenting with generalized weakness for 2 days. She attributed her weakness to the summer heat. She also endorses trouble breathing. This morning, her symptoms worsened, which prompted her to go to her PMD. She was found to be hypoxic at her PMD Dr. Bianchi to 77%. Admitted for COPD exacerbation.  Pt is a 89 yo Female, from home, ambulates using walker/wheelchair, lives with grand-daughter, with PMHx of COPD (not on home oxygen), Afib on Eliquis, HFpEF (Echo Nov 2021 EF 55-60%), prior DVT, DM, presenting with generalized weakness for 2 days, with worsening leg swelling and difficulty breathing, admitted for Acute hypoxic respiratory failure likely in the setting of CHF exacerbation

## 2022-07-28 NOTE — ED PROVIDER NOTE - OBJECTIVE STATEMENT
89-year-old female hx of COPD, AFib, HFpEG, HTN, prior DVT, presenting with generalized weakness x 2 days, found to be hypoxic at PMD Dr. Bianchi's office to 77%. Patient has been feeling weak for 2 days, attributed it to the heat, went to PMD today, was given nebs and oxygen and sent here. Denies chest pain. Has had a cough. No other symptoms.

## 2022-07-28 NOTE — ED ADULT NURSE NOTE - NSIMPLEMENTINTERV_GEN_ALL_ED
Implemented All Fall Risk Interventions:  [place] to call system. Call bell, personal items and telephone within reach. Instruct patient to call for assistance. Room bathroom lighting operational. Non-slip footwear when patient is off stretcher. Physically safe environment: no spills, clutter or unnecessary equipment. Stretcher in lowest position, wheels locked, appropriate side rails in place. Provide visual cue, wrist band, yellow gown, etc. Monitor gait and stability. Monitor for mental status changes and reorient to person, place, and time. Review medications for side effects contributing to fall risk. Reinforce activity limits and safety measures with patient and family.

## 2022-07-28 NOTE — H&P ADULT - PROBLEM SELECTOR PLAN 1
Pt p/w hypoxia to 77% at PMD's office, noted to have worsening lower extremity swelling, difficulty breathing  Xray     Although pt appears to have a more sedentary lifestyle, doubt PE in the setting of Eliquis use at home Pt p/w hypoxia to 77% at PMD's office, noted to have worsening lower extremity swelling, difficulty breathing  Possible due to medication non-compliance, lasix dose recently reduced outpatient   - 92% on 3L  - Xray : Ill-defined airspace opacities bilaterally which may be infectious or related to CHF and or not present previously. Cardiomegaly with pulmonary venous engorgement.  - Although pt appears to have a more sedentary lifestyle, doubt PE in the setting of Eliquis use at home  - EKG 7/28 - afib  - Trop: 197.5  - BNP: 2324 (from 750 last admission in Nov)  - s/p 2 doses of 20 IV Lasix  - started on lasix 40 IV BID starting tomorrow  - strict I&Os, daily weights  - trend troponins  - f/u CT chest  - cardiology consult - Dr. Siu Pt p/w hypoxia to 77% at PMD's office, noted to have worsening lower extremity swelling, difficulty breathing  Possible due to medication non-compliance, lasix dose recently reduced outpatient   - 92% on 3L, will maintain O2 sats 88-92% given COPD hx, prevent over oxygenation  - Xray : Ill-defined airspace opacities bilaterally which may be infectious or related to CHF and or not present previously. Cardiomegaly with pulmonary venous engorgement.  - Although pt appears to have a more sedentary lifestyle, doubt PE in the setting of Eliquis use at home  - EKG 7/28 - afib  - Trop: 197.5> f/u 2nd trop  - BNP: 2324 (from 750 last admission in Nov)  - s/p 2 doses of 20 IV Lasix  - started on lasix 40 IV BID starting tomorrow, likely in the setting of CHF exacerbation  - strict I&Os, daily weights  - trend troponins  - f/u CTA chest  - cardiology consult - Dr. Siu

## 2022-07-29 DIAGNOSIS — J44.1 CHRONIC OBSTRUCTIVE PULMONARY DISEASE WITH (ACUTE) EXACERBATION: ICD-10-CM

## 2022-07-29 DIAGNOSIS — R60.0 LOCALIZED EDEMA: ICD-10-CM

## 2022-07-29 LAB
A1C WITH ESTIMATED AVERAGE GLUCOSE RESULT: 7 % — HIGH (ref 4–5.6)
ANION GAP SERPL CALC-SCNC: 1 MMOL/L — LOW (ref 5–17)
APPEARANCE UR: ABNORMAL
BACTERIA # UR AUTO: ABNORMAL /HPF
BASOPHILS # BLD AUTO: 0 K/UL — SIGNIFICANT CHANGE UP (ref 0–0.2)
BASOPHILS NFR BLD AUTO: 0 % — SIGNIFICANT CHANGE UP (ref 0–2)
BILIRUB UR-MCNC: NEGATIVE — SIGNIFICANT CHANGE UP
BUN SERPL-MCNC: 16 MG/DL — SIGNIFICANT CHANGE UP (ref 7–18)
CALCIUM SERPL-MCNC: 8.9 MG/DL — SIGNIFICANT CHANGE UP (ref 8.4–10.5)
CHLORIDE SERPL-SCNC: 99 MMOL/L — SIGNIFICANT CHANGE UP (ref 96–108)
CHOLEST SERPL-MCNC: 214 MG/DL — HIGH
CO2 SERPL-SCNC: 35 MMOL/L — HIGH (ref 22–31)
COLOR SPEC: YELLOW — SIGNIFICANT CHANGE UP
CREAT SERPL-MCNC: 0.98 MG/DL — SIGNIFICANT CHANGE UP (ref 0.5–1.3)
DIFF PNL FLD: ABNORMAL
EGFR: 55 ML/MIN/1.73M2 — LOW
EOSINOPHIL # BLD AUTO: 0 K/UL — SIGNIFICANT CHANGE UP (ref 0–0.5)
EOSINOPHIL NFR BLD AUTO: 0 % — SIGNIFICANT CHANGE UP (ref 0–6)
EPI CELLS # UR: SIGNIFICANT CHANGE UP /HPF
ESTIMATED AVERAGE GLUCOSE: 154 MG/DL — HIGH (ref 68–114)
GLUCOSE BLDC GLUCOMTR-MCNC: 137 MG/DL — HIGH (ref 70–99)
GLUCOSE BLDC GLUCOMTR-MCNC: 173 MG/DL — HIGH (ref 70–99)
GLUCOSE BLDC GLUCOMTR-MCNC: 189 MG/DL — HIGH (ref 70–99)
GLUCOSE BLDC GLUCOMTR-MCNC: 192 MG/DL — HIGH (ref 70–99)
GLUCOSE BLDC GLUCOMTR-MCNC: 205 MG/DL — HIGH (ref 70–99)
GLUCOSE SERPL-MCNC: 190 MG/DL — HIGH (ref 70–99)
GLUCOSE UR QL: NEGATIVE — SIGNIFICANT CHANGE UP
HCT VFR BLD CALC: 46.3 % — HIGH (ref 34.5–45)
HDLC SERPL-MCNC: 67 MG/DL — SIGNIFICANT CHANGE UP
HGB BLD-MCNC: 14 G/DL — SIGNIFICANT CHANGE UP (ref 11.5–15.5)
IMM GRANULOCYTES NFR BLD AUTO: 1 % — SIGNIFICANT CHANGE UP (ref 0–1.5)
KETONES UR-MCNC: NEGATIVE — SIGNIFICANT CHANGE UP
LEUKOCYTE ESTERASE UR-ACNC: ABNORMAL
LIPID PNL WITH DIRECT LDL SERPL: 130 MG/DL — HIGH
LYMPHOCYTES # BLD AUTO: 0.61 K/UL — LOW (ref 1–3.3)
LYMPHOCYTES # BLD AUTO: 12.8 % — LOW (ref 13–44)
MAGNESIUM SERPL-MCNC: 2.4 MG/DL — SIGNIFICANT CHANGE UP (ref 1.6–2.6)
MCHC RBC-ENTMCNC: 30.2 GM/DL — LOW (ref 32–36)
MCHC RBC-ENTMCNC: 31.5 PG — SIGNIFICANT CHANGE UP (ref 27–34)
MCV RBC AUTO: 104.3 FL — HIGH (ref 80–100)
MONOCYTES # BLD AUTO: 0.08 K/UL — SIGNIFICANT CHANGE UP (ref 0–0.9)
MONOCYTES NFR BLD AUTO: 1.7 % — LOW (ref 2–14)
NEUTROPHILS # BLD AUTO: 4.03 K/UL — SIGNIFICANT CHANGE UP (ref 1.8–7.4)
NEUTROPHILS NFR BLD AUTO: 84.5 % — HIGH (ref 43–77)
NITRITE UR-MCNC: NEGATIVE — SIGNIFICANT CHANGE UP
NON HDL CHOLESTEROL: 147 MG/DL — HIGH
NRBC # BLD: 0 /100 WBCS — SIGNIFICANT CHANGE UP (ref 0–0)
PH UR: 6 — SIGNIFICANT CHANGE UP (ref 5–8)
PHOSPHATE SERPL-MCNC: 5.2 MG/DL — HIGH (ref 2.5–4.5)
PLATELET # BLD AUTO: 246 K/UL — SIGNIFICANT CHANGE UP (ref 150–400)
POTASSIUM SERPL-MCNC: 5 MMOL/L — SIGNIFICANT CHANGE UP (ref 3.5–5.3)
POTASSIUM SERPL-SCNC: 5 MMOL/L — SIGNIFICANT CHANGE UP (ref 3.5–5.3)
PROT UR-MCNC: 15
RBC # BLD: 4.44 M/UL — SIGNIFICANT CHANGE UP (ref 3.8–5.2)
RBC # FLD: 13.3 % — SIGNIFICANT CHANGE UP (ref 10.3–14.5)
RBC CASTS # UR COMP ASSIST: ABNORMAL /HPF (ref 0–2)
SODIUM SERPL-SCNC: 135 MMOL/L — SIGNIFICANT CHANGE UP (ref 135–145)
SP GR SPEC: 1.01 — SIGNIFICANT CHANGE UP (ref 1.01–1.02)
TRIGL SERPL-MCNC: 83 MG/DL — SIGNIFICANT CHANGE UP
TROPONIN I, HIGH SENSITIVITY RESULT: 178 NG/L — HIGH
UROBILINOGEN FLD QL: NEGATIVE — SIGNIFICANT CHANGE UP
WBC # BLD: 4.77 K/UL — SIGNIFICANT CHANGE UP (ref 3.8–10.5)
WBC # FLD AUTO: 4.77 K/UL — SIGNIFICANT CHANGE UP (ref 3.8–10.5)
WBC UR QL: SIGNIFICANT CHANGE UP /HPF (ref 0–5)

## 2022-07-29 PROCEDURE — 93970 EXTREMITY STUDY: CPT | Mod: 26

## 2022-07-29 RX ORDER — FUROSEMIDE 40 MG
40 TABLET ORAL
Refills: 0 | Status: DISCONTINUED | OUTPATIENT
Start: 2022-07-29 | End: 2022-07-31

## 2022-07-29 RX ORDER — INSULIN LISPRO 100/ML
VIAL (ML) SUBCUTANEOUS
Refills: 0 | Status: DISCONTINUED | OUTPATIENT
Start: 2022-07-29 | End: 2022-08-04

## 2022-07-29 RX ADMIN — CEFTRIAXONE 100 MILLIGRAM(S): 500 INJECTION, POWDER, FOR SOLUTION INTRAMUSCULAR; INTRAVENOUS at 21:43

## 2022-07-29 RX ADMIN — CARVEDILOL PHOSPHATE 6.25 MILLIGRAM(S): 80 CAPSULE, EXTENDED RELEASE ORAL at 17:46

## 2022-07-29 RX ADMIN — Medication 1: at 12:04

## 2022-07-29 RX ADMIN — Medication 40 MILLIGRAM(S): at 06:53

## 2022-07-29 RX ADMIN — APIXABAN 5 MILLIGRAM(S): 2.5 TABLET, FILM COATED ORAL at 17:45

## 2022-07-29 RX ADMIN — Medication 2: at 00:11

## 2022-07-29 RX ADMIN — AZITHROMYCIN 255 MILLIGRAM(S): 500 TABLET, FILM COATED ORAL at 22:57

## 2022-07-29 RX ADMIN — Medication 40 MILLIGRAM(S): at 17:45

## 2022-07-29 RX ADMIN — Medication 40 MILLIGRAM(S): at 17:46

## 2022-07-29 RX ADMIN — SIMVASTATIN 20 MILLIGRAM(S): 20 TABLET, FILM COATED ORAL at 21:44

## 2022-07-29 RX ADMIN — PANTOPRAZOLE SODIUM 40 MILLIGRAM(S): 20 TABLET, DELAYED RELEASE ORAL at 06:53

## 2022-07-29 RX ADMIN — APIXABAN 5 MILLIGRAM(S): 2.5 TABLET, FILM COATED ORAL at 06:52

## 2022-07-29 RX ADMIN — ALBUTEROL 1 PUFF(S): 90 AEROSOL, METERED ORAL at 11:50

## 2022-07-29 RX ADMIN — Medication 1: at 17:44

## 2022-07-29 RX ADMIN — Medication 1: at 08:18

## 2022-07-29 RX ADMIN — Medication 325 MILLIGRAM(S): at 11:59

## 2022-07-29 RX ADMIN — Medication 81 MILLIGRAM(S): at 11:59

## 2022-07-29 RX ADMIN — ALBUTEROL 1 PUFF(S): 90 AEROSOL, METERED ORAL at 21:43

## 2022-07-29 RX ADMIN — CARVEDILOL PHOSPHATE 6.25 MILLIGRAM(S): 80 CAPSULE, EXTENDED RELEASE ORAL at 06:52

## 2022-07-29 RX ADMIN — MONTELUKAST 10 MILLIGRAM(S): 4 TABLET, CHEWABLE ORAL at 11:59

## 2022-07-29 NOTE — PROGRESS NOTE ADULT - SUBJECTIVE AND OBJECTIVE BOX
Patient was seen and examined  Patient is a 89y old  Female who presents with a chief complaint of Weakness, SOB (2022 19:43)      INTERVAL HPI/OVERNIGHT EVENTS:  T(C): 36.3 (22 @ 04:50), Max: 36.9 (22 @ 14:00)  HR: 76 (22 @ 04:50) (70 - 77)  BP: 116/77 (22 @ 04:50) (116/77 - 156/94)  RR: 18 (22 @ 04:50) (18 - 20)  SpO2: 100% (22 @ 04:50) (95% - 100%)  Wt(kg): --  I&O's Summary    2022 07:01  -  2022 06:56  --------------------------------------------------------  IN: 0 mL / OUT: 100 mL / NET: -100 mL        LABS:                        14.0   4.77  )-----------( 246      ( 2022 06:25 )             46.3         136  |  101  |  15  ----------------------------<  131<H>  4.7   |  35<H>  |  1.01    Ca    8.8      2022 15:30    TPro  7.3  /  Alb  3.3<L>  /  TBili  0.6  /  DBili  x   /  AST  25  /  ALT  31  /  AlkPhos  142<H>        Urinalysis Basic - ( 2022 03:10 )    Color: Yellow / Appearance: Slightly Turbid / S.010 / pH: x  Gluc: x / Ketone: Negative  / Bili: Negative / Urobili: Negative   Blood: x / Protein: 15 / Nitrite: Negative   Leuk Esterase: Small / RBC: 2-5 /HPF / WBC 3-5 /HPF   Sq Epi: x / Non Sq Epi: Few /HPF / Bacteria: Moderate /HPF      CAPILLARY BLOOD GLUCOSE      POCT Blood Glucose.: 205 mg/dL (2022 00:05)  POCT Blood Glucose.: 139 mg/dL (2022 14:12)          Urinalysis Basic - ( 2022 03:10 )    Color: Yellow / Appearance: Slightly Turbid / S.010 / pH: x  Gluc: x / Ketone: Negative  / Bili: Negative / Urobili: Negative   Blood: x / Protein: 15 / Nitrite: Negative   Leuk Esterase: Small / RBC: 2-5 /HPF / WBC 3-5 /HPF   Sq Epi: x / Non Sq Epi: Few /HPF / Bacteria: Moderate /HPF        MEDICATIONS  (STANDING):  ALBUTerol    90 MICROgram(s) HFA Inhaler 1 Puff(s) Inhalation two times a day  apixaban 5 milliGRAM(s) Oral two times a day  aspirin  chewable 81 milliGRAM(s) Oral daily  azithromycin  IVPB      azithromycin  IVPB 500 milliGRAM(s) IV Intermittent every 24 hours  carvedilol 6.25 milliGRAM(s) Oral every 12 hours  cefTRIAXone   IVPB      cefTRIAXone   IVPB 1000 milliGRAM(s) IV Intermittent every 24 hours  ferrous    sulfate 325 milliGRAM(s) Oral daily  furosemide   Injectable 40 milliGRAM(s) IV Push daily  insulin lispro (ADMELOG) corrective regimen sliding scale   SubCutaneous Before meals and at bedtime  methylPREDNISolone sodium succinate Injectable 40 milliGRAM(s) IV Push every 8 hours  montelukast 10 milliGRAM(s) Oral daily  pantoprazole    Tablet 40 milliGRAM(s) Oral before breakfast  simvastatin 20 milliGRAM(s) Oral at bedtime    MEDICATIONS  (PRN):      RADIOLOGY & ADDITIONAL TESTS:    Imaging Personally Reviewed:  [ ] YES  [ ] NO    REVIEW OF SYSTEMS:  CONSTITUTIONAL: No fever, weight loss, or fatigue  EYES: No eye pain, visual disturbances, or discharge  ENMT:  No difficulty hearing, tinnitus, vertigo; No sinus or throat pain  NECK: No pain or stiffness  BREASTS: No pain, masses, or nipple discharge  RESPIRATORY: No cough, wheezing, chills or hemoptysis; No shortness of breath  CARDIOVASCULAR: No chest pain, palpitations, dizziness, or leg swelling  GASTROINTESTINAL: No abdominal or epigastric pain. No nausea, vomiting, or hematemesis; No diarrhea or constipation. No melena or hematochezia.  GENITOURINARY: No dysuria, frequency, hematuria, or incontinence  NEUROLOGICAL: No headaches, memory loss, loss of strength, numbness, or tremors  SKIN: No itching, burning, rashes, or lesions   LYMPH NODES: No enlarged glands  ENDOCRINE: No heat or cold intolerance; No hair loss  MUSCULOSKELETAL: No joint pain or swelling; No muscle, back, or extremity pain  PSYCHIATRIC: No depression, anxiety, mood swings, or difficulty sleeping  HEME/LYMPH: No easy bruising, or bleeding gums  ALLERY AND IMMUNOLOGIC: No hives or eczema      Consultant(s) Notes Reviewed:  [ x ] YES  [ ] NO    PHYSICAL EXAM:  GENERAL: NAD, well-groomed, well-developed  HEAD:  Atraumatic, Normocephalic  EYES: EOMI, PERRLA, conjunctiva and sclera clear  ENMT: No tonsillar erythema, exudates, or enlargement; Moist mucous membranes, Good dentition, No lesions  NECK: Supple, No JVD, Normal thyroid  NERVOUS SYSTEM:  Alert & Oriented X3, Good concentration; Motor Strength 5/5 B/L upper and lower extremities; DTRs 2+ intact and symmetric  CHEST/LUNG: Clear to percussion bilaterally; No rales, rhonchi, wheezing, or rubs  HEART: Regular rate and rhythm; No murmurs, rubs, or gallops  ABDOMEN: Soft, Nontender, Nondistended; Bowel sounds present  EXTREMITIES:  2+ Peripheral Pulses, No clubbing, cyanosis, or edema  LYMPH: No lymphadenopathy noted  SKIN: No rashes or lesions    Care Discussed with Consultants/Other Providers [ x] YES  [ ] NO

## 2022-07-29 NOTE — PROGRESS NOTE ADULT - PROBLEM SELECTOR PLAN 1
Pt p/w hypoxia to 77% at PMD's office, noted to have worsening lower extremity swelling, difficulty breathing  Possible due to medication non-compliance, lasix dose recently reduced outpatient   - 92% on 3L, will maintain O2 sats 88-92% given COPD hx, prevent over oxygenation  - Xray : Ill-defined airspace opacities bilaterally which may be infectious or related to CHF and or not present previously. Cardiomegaly with pulmonary venous engorgement.  - Although pt appears to have a more sedentary lifestyle, doubt PE in the setting of Eliquis use at home  - EKG 7/28 - afib  - Trop: 197.5 >178, downtrending   - BNP: 2324 (from 750 last admission in Nov)  - CTA 7/27 = small R sided pleural effusion, no evidence of PE   - s/p 2 doses of 20 IV Lasix  - c/w lasix 40 IV BID  - strict I&Os, daily weights  - suspicion for pneumonia - pt. also on ceftriaxone, and azithromycin   - Dr. Denton  cardiology on board   - Dr. Denton ID consulted

## 2022-07-29 NOTE — CONSULT NOTE ADULT - PROBLEM SELECTOR RECOMMENDATION 9
oxygen supplementation  monitor oxygen saturation   Bronchodilators PRN  check ABGs  Monitor respiratory status

## 2022-07-29 NOTE — CONSULT NOTE ADULT - ASSESSMENT
Pt is a 89 yo Female, from home, ambulates using walker/wheelchair, lives with grand-daughter, with PMHx of COPD (not on home oxygen), Afib on Eliquis, HFpEF (Echo Nov 2021 EF 55-60%), prior DVT, DM, presenting with generalized weakness for 2 days, with worsening leg swelling and difficulty breathing, admitted for Acute hypoxic respiratory failure likely in the setting of CHF exacerbation  PENDING CARDIOLOGY AND PULM

## 2022-07-29 NOTE — CONSULT NOTE ADULT - SUBJECTIVE AND OBJECTIVE BOX
Patient is a 89y old  Female from home, ambulates using walker/wheelchair, lives with grand-daughter, with PMHx of COPD (not on home oxygen), Afib on Eliquis, HFpEF (Echo 2021 EF 55-60%), prior DVT, DM, presenting with generalized weakness for 2 days. Pt is a poor historian--She attributed her weakness to the summer heat. Says her weakness worsened this morning, her symptoms worsened, which prompted her to go to her PMD. She was found to be hypoxic at her PMD Dr. Bianchi to 77%. She endorses leg swelling, but states that her leg swelling has been on and off for many years. Grand-daughter at bedside endorses that medications are usually given by patient's daughter to the patient, pt may have missed a few doses of Lasix. Endorses that family tries their best to have a salt restricted/fluid restricted diet. Papers from PMD office shows that pt is on Lasix 20 mg daily, was on Lasix 40 mg last month--unsure why dose was changed, family and patient unsure of the dosage as well. Pt endorses no chest pain, cough, sputum production, palpitations, cough, fever, abdominal pain, constipation, diarrhea, nausea, vomiting. She reports no increase in use of her nebulizer in the fast few days. No recent sick contacts, or travel.       REVIEW OF SYSTEMS: Total of twelve systems have been reviewed with patient and found to be negative unless mentioned in HPI        PAST MEDICAL & SURGICAL HISTORY:  History of hypertension  Arthritis  Legally blind  Pre-diabetes  Right Breast cancer  COPD exacerbation  Atrial fibrillation  HF (heart failure)  No significant past surgical history        SOCIAL HISTORY  Alcohol: Does not drink  Tobacco: Does not smoke  Illicit substance use: None      FAMILY HISTORY: Non contributory to the present illness        ALLERGIES: losartan (Angioedema)        Vital Signs Last 24 Hrs  T(C): 36.6 (2022 10:52), Max: 36.7 (2022 20:53)  T(F): 97.9 (2022 10:52), Max: 98 (2022 20:53)  HR: 58 (2022 10:52) (58 - 76)  BP: 108/64 (2022 10:52) (108/64 - 156/94)  BP(mean): --  RR: 18 (2022 10:52) (18 - 19)  SpO2: 96% (2022 10:52) (95% - 100%)    Parameters below as of 2022 10:52  Patient On (Oxygen Delivery Method): room air        PHYSICAL EXAM:  GENERAL: Not in distress   CHEST/LUNG:  Aire ntry bilaterally  HEART: s1 and s2 present  ABDOMEN:  Nontender and  Nondistended  EXTREMITIES: No pedal  edema  CNS: Awake and Alert      LABS:                        14.0   4.77  )-----------( 246      ( 2022 06:25 )             46.3       07-    135  |  99  |  16  ----------------------------<  190<H>  5.0   |  35<H>  |  0.98    Ca    8.9      2022 06:25  Phos  5.2       Mg     2.4         TPro  7.3  /  Alb  3.3<L>  /  TBili  0.6  /  DBili  x   /  AST  25  /  ALT  31  /  AlkPhos  142<H>  07-28        CAPILLARY BLOOD GLUCOSE  POCT Blood Glucose.: 192 mg/dL (2022 11:37)  POCT Blood Glucose.: 189 mg/dL (2022 07:37)  POCT Blood Glucose.: 205 mg/dL (2022 00:05)        Urinalysis Basic - ( 2022 03:10 )  Color: Yellow / Appearance: Slightly Turbid / S.010 / pH: x  Gluc: x / Ketone: Negative  / Bili: Negative / Urobili: Negative   Blood: x / Protein: 15 / Nitrite: Negative   Leuk Esterase: Small / RBC: 2-5 /HPF / WBC 3-5 /HPF   Sq Epi: x / Non Sq Epi: Few /HPF / Bacteria: Moderate /HPF        MEDICATIONS  (STANDING):  ALBUTerol    90 MICROgram(s) HFA Inhaler 1 Puff(s) Inhalation two times a day  apixaban 5 milliGRAM(s) Oral two times a day  aspirin  chewable 81 milliGRAM(s) Oral daily  azithromycin  IVPB      azithromycin  IVPB 500 milliGRAM(s) IV Intermittent every 24 hours  carvedilol 6.25 milliGRAM(s) Oral every 12 hours  cefTRIAXone   IVPB      cefTRIAXone   IVPB 1000 milliGRAM(s) IV Intermittent every 24 hours  ferrous    sulfate 325 milliGRAM(s) Oral daily  furosemide   Injectable 40 milliGRAM(s) IV Push two times a day  insulin lispro (ADMELOG) corrective regimen sliding scale   SubCutaneous Before meals and at bedtime  methylPREDNISolone sodium succinate Injectable 40 milliGRAM(s) IV Push every 12 hours  montelukast 10 milliGRAM(s) Oral daily  pantoprazole    Tablet 40 milliGRAM(s) Oral before breakfast  simvastatin 20 milliGRAM(s) Oral at bedtime    MEDICATIONS  (PRN):        RADIOLOGY & ADDITIONAL TESTS:    < from: CT Angio Chest PE Protocol w/ IV Cont (22 @ 22:52) >  Limited evaluation for segmental and subsegmental pulmonary embolism. No   main, left right, or lobar pulmonary embolism.      < from: Xray Chest 1 View- PORTABLE-Urgent (22 @ 15:55) >  1. Ill-defined airspace opacities bilaterally which may be infectious or   related to CHF and or not present previously.  2. Cardiomegaly with pulmonary venous engorgement.  3. Elevated right hemidiaphragm, unchanged.      MICROBIOLOGY DATA:    Respiratory Viral Panel with COVID-19 by PATRICK (22 @ 15:30)   Rapid RVP Result: [facility]   SARS-CoV-2: NotDete:             Patient is a 89y old  Female from home, ambulates using walker/wheelchair, lives with grand-daughter, with PMHx of COPD (not on home oxygen), Afib on Eliquis, HFpEF (Echo 2021 EF 55-60%), prior DVT, DM, presents to the ER for evaluation of generalized weakness for 2 days. She went to see her PMD and found to be hypoxic at her PMD, Dr. Bianchi's office,  to 77%. She endorses leg swelling, but states that her leg swelling has been on and off for many years. On admission, she found to have no fever, no Leukocytosis but CXR shows  Ill-defined airspace opacities bilaterally which may be infectious or related to CHF and or not present previously. She has started on Ceftriaxone and Azithromycin, and the ID consult requested to assist with further evaluation and antibiotic management.       REVIEW OF SYSTEMS: Total of twelve systems have been reviewed with patient and found to be negative unless mentioned in HPI        PAST MEDICAL & SURGICAL HISTORY:  History of hypertension  Arthritis  Legally blind  Pre-diabetes  Right Breast cancer  COPD exacerbation  Atrial fibrillation  HF (heart failure)  No significant past surgical history        SOCIAL HISTORY  Alcohol: Does not drink  Tobacco: Does not smoke  Illicit substance use: None      FAMILY HISTORY: Non contributory to the present illness      ALLERGIES: losartan (Angioedema)      Vital Signs Last 24 Hrs  T(C): 36.6 (2022 10:52), Max: 36.7 (2022 20:53)  T(F): 97.9 (2022 10:52), Max: 98 (2022 20:53)  HR: 58 (2022 10:52) (58 - 76)  BP: 108/64 (2022 10:52) (108/64 - 156/94)  BP(mean): --  RR: 18 (2022 10:52) (18 - 19)  SpO2: 96% (2022 10:52) (95% - 100%)    Parameters below as of 2022 10:52  Patient On (Oxygen Delivery Method): room air        PHYSICAL EXAM:  GENERAL: Not in distress, on oxygen via NC  CHEST/LUNG:  Not using accessory muscles   HEART: s1 and s2 present  ABDOMEN:  Nontender and  Nondistended  EXTREMITIES: B/L pedal  edema  CNS: Awake and Alert      LABS:                        14.0   4.77  )-----------( 246      ( 2022 06:25 )             46.3           135  |  99  |  16  ----------------------------<  190<H>  5.0   |  35<H>  |  0.98    Ca    8.9      2022 06:25  Phos  5.2       Mg     2.4         TPro  7.3  /  Alb  3.3<L>  /  TBili  0.6  /  DBili  x   /  AST  25  /  ALT  31  /  AlkPhos  142<H>          CAPILLARY BLOOD GLUCOSE  POCT Blood Glucose.: 192 mg/dL (2022 11:37)  POCT Blood Glucose.: 189 mg/dL (2022 07:37)  POCT Blood Glucose.: 205 mg/dL (2022 00:05)        Urinalysis Basic - ( 2022 03:10 )  Color: Yellow / Appearance: Slightly Turbid / S.010 / pH: x  Gluc: x / Ketone: Negative  / Bili: Negative / Urobili: Negative   Blood: x / Protein: 15 / Nitrite: Negative   Leuk Esterase: Small / RBC: 2-5 /HPF / WBC 3-5 /HPF   Sq Epi: x / Non Sq Epi: Few /HPF / Bacteria: Moderate /HPF        MEDICATIONS  (STANDING):  ALBUTerol    90 MICROgram(s) HFA Inhaler 1 Puff(s) Inhalation two times a day  apixaban 5 milliGRAM(s) Oral two times a day  aspirin  chewable 81 milliGRAM(s) Oral daily  azithromycin  IVPB      azithromycin  IVPB 500 milliGRAM(s) IV Intermittent every 24 hours  carvedilol 6.25 milliGRAM(s) Oral every 12 hours  cefTRIAXone   IVPB      cefTRIAXone   IVPB 1000 milliGRAM(s) IV Intermittent every 24 hours  ferrous    sulfate 325 milliGRAM(s) Oral daily  furosemide   Injectable 40 milliGRAM(s) IV Push two times a day  insulin lispro (ADMELOG) corrective regimen sliding scale   SubCutaneous Before meals and at bedtime  methylPREDNISolone sodium succinate Injectable 40 milliGRAM(s) IV Push every 12 hours  montelukast 10 milliGRAM(s) Oral daily  pantoprazole    Tablet 40 milliGRAM(s) Oral before breakfast  simvastatin 20 milliGRAM(s) Oral at bedtime    MEDICATIONS  (PRN):        RADIOLOGY & ADDITIONAL TESTS:    22: CT Angio Chest PE Protocol w/ IV Cont (22 @ 22:52) Limited evaluation for segmental and subsegmental pulmonary embolism. No   main, left right, or lobar pulmonary embolism.      22: Xray Chest 1 View- PORTABLE-Urgent (22 @ 15:55) >  1. Ill-defined airspace opacities bilaterally which may be infectious or related to CHF and or not present previously.  2. Cardiomegaly with pulmonary venous engorgement.  3. Elevated right hemidiaphragm, unchanged.      MICROBIOLOGY DATA:    Respiratory Viral Panel with COVID-19 by PATRICK (22 @ 15:30)   Rapid RVP Result: NotDetec   SARS-CoV-2: NotDetec:

## 2022-07-29 NOTE — CONSULT NOTE ADULT - SUBJECTIVE AND OBJECTIVE BOX
DATE OF SERVICE:Patient was seen,examined and evaluated on- 7/29    CHIEF COMPLAINT:    HPI:HPI:  Pt is a 89 yo Female, from home, ambulates using walker/wheelchair, lives with grand-daughter, with PMHx of COPD (not on home oxygen), Afib on Eliquis, HFpEF (Echo Nov 2021 EF 55-60%), prior DVT, DM, presenting with generalized weakness for 2 days. Pt is a poor historian--She attributed her weakness to the summer heat. Says her weakness worsened this morning, her symptoms worsened, which prompted her to go to her PMD. She was found to be hypoxic at her PMD Dr. Bianchi to 77%. She endorses leg swelling, but states that her leg swelling has been on and off for many years. Grand-daughter at bedside endorses that medications are usually given by patient's daughter to the patient, pt may have missed a few doses of Lasix. Endorses that family tries their best to have a salt restricted/fluid restricted diet. Papers from PMD office shows that pt is on Lasix 20 mg daily, was on Lasix 40 mg last month--unsure why dose was changed, family and patient unsure of the dosage as well. Pt endorses no chest pain, cough, sputum production, palpitations, cough, fever, abdominal pain, constipation, diarrhea, nausea, vomiting. She reports no increase in use of her nebulizer in the fast few days. No recent sick contacts, or travel.     In the ED, Vitals were:  Temp 98.4 F, 118/79, 77 HR, 98% on 2L (rechecked her O2 saturation, 85% on RA, 91% on 3L)  EKG Afib, Trop 197.5  Cxray suggestive of fluid overload (28 Jul 2022 19:43)      PAST MEDICAL & SURGICAL HISTORY:  History of hypertension      Arthritis      Legally blind      Pre-diabetes      Breast cancer  right      COPD exacerbation      Atrial fibrillation      HF (heart failure)      No significant past surgical history          MEDICATIONS  (STANDING):  ALBUTerol    90 MICROgram(s) HFA Inhaler 1 Puff(s) Inhalation two times a day  apixaban 5 milliGRAM(s) Oral two times a day  aspirin  chewable 81 milliGRAM(s) Oral daily  azithromycin  IVPB      azithromycin  IVPB 500 milliGRAM(s) IV Intermittent every 24 hours  carvedilol 6.25 milliGRAM(s) Oral every 12 hours  cefTRIAXone   IVPB      cefTRIAXone   IVPB 1000 milliGRAM(s) IV Intermittent every 24 hours  ferrous    sulfate 325 milliGRAM(s) Oral daily  furosemide   Injectable 40 milliGRAM(s) IV Push two times a day  insulin lispro (ADMELOG) corrective regimen sliding scale   SubCutaneous Before meals and at bedtime  methylPREDNISolone sodium succinate Injectable 40 milliGRAM(s) IV Push every 12 hours  montelukast 10 milliGRAM(s) Oral daily  pantoprazole    Tablet 40 milliGRAM(s) Oral before breakfast  simvastatin 20 milliGRAM(s) Oral at bedtime    MEDICATIONS  (PRN):      FAMILY HISTORY:    No family history of premature coronary artery disease or sudden cardiac death    SOCIAL HISTORY:  Smoking-  Alcohol-  Ilicit Drug use-    REVIEW OF SYSTEMS:  Constitutional: [ ] fever, [ ]weight loss, [ ]fatigue Activity [ ] Bedbound,[ ] Ambulates [ ] Unassisted[ ] Cane/Walker [ ] Assistence.  Eyes: [ ] visual changes  Respiratory: [ +]shortness of breath;  [ ] cough, [ ]wheezing, [ ]chills, [ ]hemoptysis  Cardiovascular: [ ] chest pain, [ ]palpitations, [ ]dizziness,  [ ]leg swelling[ ]orthopnea [ ]PND  Gastrointestinal: [ ] abdominal pain, [ ]nausea, [ ]vomiting,  [ ]diarrhea,[ ]constipation  Genitourinary: [ ] dysuria, [ ] hematuria  Neurologic: [ ] headaches [ ] tremors[ ] weakness  Skin: [ ] itching, [ ]burning, [ ] rashes  Endocrine: [ ] heat or cold intolerance  Musculoskeletal: [ ] joint pain or swelling; [ ] muscle, back, or extremity pain  Psychiatric: [ ] depression, [ ]anxiety, [ ]mood swings, or [ ]difficulty sleeping  Hematologic: [ ] easy bruising, [ ] bleeding gums       [ x] All others negative	  [ ] Unable to obtain    Vital Signs Last 24 Hrs  T(C): 36.6 (29 Jul 2022 10:52), Max: 36.9 (28 Jul 2022 14:00)  T(F): 97.9 (29 Jul 2022 10:52), Max: 98.4 (28 Jul 2022 14:00)  HR: 58 (29 Jul 2022 10:52) (58 - 77)  BP: 108/64 (29 Jul 2022 10:52) (108/64 - 156/94)  BP(mean): --  RR: 18 (29 Jul 2022 10:52) (18 - 20)  SpO2: 96% (29 Jul 2022 10:52) (95% - 100%)    Parameters below as of 29 Jul 2022 10:52  Patient On (Oxygen Delivery Method): room air      I&O's Summary    28 Jul 2022 07:01  -  29 Jul 2022 07:00  --------------------------------------------------------  IN: 0 mL / OUT: 150 mL / NET: -150 mL        PHYSICAL EXAM:  General: No acute distress BMI-  HEENT: EOMI, PERRL[ ] Icteric  Neck: Supple, No JVD  Lungs: Equal air entry bilaterally; [ ] Rales [ ] Rhonchi [ ] Wheezing  Heart: Regular rate and rhythm;[ ] Murmurs-   /6 [ ] Systolic [ ] Diastolic [ ] Radiation, No rubs, or gallops  Abdomen: Nontender, bowel sounds present  Extremities: No clubbing, cyanosis, + edema  Nervous system:  Alert & Oriented X3, no focal deficits  Psychiatric: Normal affect  Skin: No rashes or lesions      LABS:  07-29    135  |  99  |  16  ----------------------------<  190<H>  5.0   |  35<H>  |  0.98    Ca    8.9      29 Jul 2022 06:25  Phos  5.2     07-29  Mg     2.4     07-29    TPro  7.3  /  Alb  3.3<L>  /  TBili  0.6  /  DBili  x   /  AST  25  /  ALT  31  /  AlkPhos  142<H>  07-28    Creatinine Trend: 0.98<--, 1.01<--                        14.0   4.77  )-----------( 246      ( 29 Jul 2022 06:25 )             46.3         Lipid Panel: Cholesterol, Serum 214  Direct LDL --  HDL Cholesterol, Serum 67  Triglycerides, Serum 83    Cardiac Enzymes:     Serum Pro-Brain Natriuretic Peptide: 2324 pg/mL (07-28-22 @ 15:30)        RADIOLOGY:    ECG [my interpretation]:    TELEMETRY:    ECHO:    STRESS TEST:    CATHETERIZATION:

## 2022-07-29 NOTE — CONSULT NOTE ADULT - ASSESSMENT
Patient is a 89y old  Female from home, ambulates using walker/wheelchair, lives with grand-daughter, with PMHx of COPD (not on home oxygen), Afib on Eliquis, HFpEF (Echo Nov 2021 EF 55-60%), prior DVT, DM, presents to the ER for evaluation of generalized weakness for 2 days. She went to see her PMD and found to be hypoxic at her PMD, Dr. Bianchi's office,  to 77%. She endorses leg swelling, but states that her leg swelling has been on and off for many years. On admission, she found to have no fever, no Leukocytosis but CXR shows  Ill-defined airspace opacities bilaterally which may be infectious or related to CHF and or not present previously. She has started on Ceftriaxone and Azithromycin, and the ID consult requested to assist with further evaluation and antibiotic management.     # Pneumonia  - on CXR  # CHF exacerbation    would recommend:    1. Please obtain MRSA PCR and Legionella urine Ag  2. Obtain Procalcitonin level  3. Diuresis as per Primary/cardiology team  4. Continue Ceftriaxone and Azithromycin until work up is done  5. Keep Both LE elevated  6. Supplemental oxygenation and bronchodilator as needed    will follow the patient with you and make further recommendation based on the clinical course and Lab results  Thank you for the opportunity to participate in Ms. LAST's care    Attending Attestation:  Time-based billing (NON-critical care).   Spent more than 65 minutes on total encounter, more than 50 % of the visit was spent counseling and/or coordinating care by the Attending physician.  The necessity of the time spent during the encounter on this date of service is due to complexity of:    - Pneumonia  - CHF exacerbation  - Hypoxia- on supplemental oxygenation

## 2022-07-29 NOTE — PROGRESS NOTE ADULT - PROBLEM SELECTOR PLAN 4
Pt has hx of COPD, uses Albuterol inhaler PRN, Singulair, Nebs as needed at home  S/p Nebulizer treatment in ED, 125 mg IVP Solumedrol, pt still noted to have mild wheezing  Continue home inhalers  F/u CTA chest

## 2022-07-29 NOTE — PROGRESS NOTE ADULT - SUBJECTIVE AND OBJECTIVE BOX
PGY-1 Progress Note discussed with attending    CHIEF COMPLAINT & BRIEF HOSPITAL COURSE:    Pt is a 89 yo Female, from home, ambulates using walker/wheelchair, lives with grand-daughter, with PMHx of COPD (not on home oxygen), Afib on Eliquis, HFpEF (Echo Nov 2021 EF 55-60%), prior DVT, DM, presenting with generalized weakness for 2 days, with worsening leg swelling and difficulty breathing, admitted for Acute hypoxic respiratory failure likely in the setting of CHF exacerbation.     INTERVAL HPI/OVERNIGHT EVENTS:     Pt. was sitting in bed, trying to reposition herself when I examined her. She was accompanied by her granddaughter at bedside. Pt. states that her shortness of breath has improved. She is currently on 2L O2. She is complaining of some b/l knee pain, and her leg swelling has not improved. Denies any other complaints. No fevers, coughs, chest pain, shortness of breath.     MEDICATIONS  (STANDING):  ALBUTerol    90 MICROgram(s) HFA Inhaler 1 Puff(s) Inhalation two times a day  apixaban 5 milliGRAM(s) Oral two times a day  aspirin  chewable 81 milliGRAM(s) Oral daily  azithromycin  IVPB      azithromycin  IVPB 500 milliGRAM(s) IV Intermittent every 24 hours  carvedilol 6.25 milliGRAM(s) Oral every 12 hours  cefTRIAXone   IVPB      cefTRIAXone   IVPB 1000 milliGRAM(s) IV Intermittent every 24 hours  ferrous    sulfate 325 milliGRAM(s) Oral daily  furosemide   Injectable 40 milliGRAM(s) IV Push two times a day  insulin lispro (ADMELOG) corrective regimen sliding scale   SubCutaneous Before meals and at bedtime  methylPREDNISolone sodium succinate Injectable 40 milliGRAM(s) IV Push every 12 hours  montelukast 10 milliGRAM(s) Oral daily  pantoprazole    Tablet 40 milliGRAM(s) Oral before breakfast  simvastatin 20 milliGRAM(s) Oral at bedtime    MEDICATIONS  (PRN):      REVIEW OF SYSTEMS:  CONSTITUTIONAL: No fever, weight loss, or fatigue  RESPIRATORY: No cough, wheezing, chills or hemoptysis; No shortness of breath  CARDIOVASCULAR: No chest pain, palpitations, dizziness, or leg swelling  GASTROINTESTINAL: No abdominal pain. No nausea, vomiting, or hematemesis; No diarrhea or constipation. No melena or hematochezia.  GENITOURINARY: No dysuria or hematuria, urinary frequency  NEUROLOGICAL: No headaches, memory loss, loss of strength, numbness, or tremors  SKIN: No itching, burning, rashes, or lesions     Vital Signs Last 24 Hrs  T(C): 36.6 (29 Jul 2022 10:52), Max: 36.7 (28 Jul 2022 20:53)  T(F): 97.9 (29 Jul 2022 10:52), Max: 98 (28 Jul 2022 20:53)  HR: 58 (29 Jul 2022 10:52) (58 - 76)  BP: 108/64 (29 Jul 2022 10:52) (108/64 - 156/94)  BP(mean): --  RR: 18 (29 Jul 2022 10:52) (18 - 19)  SpO2: 96% (29 Jul 2022 10:52) (95% - 100%)    Parameters below as of 29 Jul 2022 10:52  Patient On (Oxygen Delivery Method): room air        PHYSICAL EXAMINATION:  GENERAL: NAD, well built  HEAD:  Atraumatic, Normocephalic  EYES:  conjunctiva and sclera clear  NECK: Supple, No JVD, Normal thyroid  CHEST/LUNG: Inspiratory crackles appreciated at the bases b/l, expiratory wheezes appreciated in the middle lobes b/l, improved from yesterday. Clear to percussion bilaterally; No rales, rhonchi, or rubs  HEART: Systolic murmur auscultated on exam. Regular rate and rhythm; No rubs, or gallops  ABDOMEN: Soft, Nontender, Nondistended; Bowel sounds present  NERVOUS SYSTEM:  Alert & Oriented X3,    EXTREMITIES:  2+ Peripheral Pulses, No clubbing, cyanosis, or edema  SKIN: warm dry                          14.0   4.77  )-----------( 246      ( 29 Jul 2022 06:25 )             46.3     07-29    135  |  99  |  16  ----------------------------<  190<H>  5.0   |  35<H>  |  0.98    Ca    8.9      29 Jul 2022 06:25  Phos  5.2     07-29  Mg     2.4     07-29    TPro  7.3  /  Alb  3.3<L>  /  TBili  0.6  /  DBili  x   /  AST  25  /  ALT  31  /  AlkPhos  142<H>  07-28    LIVER FUNCTIONS - ( 28 Jul 2022 15:30 )  Alb: 3.3 g/dL / Pro: 7.3 g/dL / ALK PHOS: 142 U/L / ALT: 31 U/L DA / AST: 25 U/L / GGT: x                   CAPILLARY BLOOD GLUCOSE      RADIOLOGY & ADDITIONAL TESTS:

## 2022-07-29 NOTE — CONSULT NOTE ADULT - SUBJECTIVE AND OBJECTIVE BOX
PULMONARY CONSULT NOTE      NUNO LAST  [id number]    History of Present Illness:  Reason for Admission: Weakness, SOB  History of Present Illness:   Pt is a 89 yo Female, from home, ambulates using walker/wheelchair, lives with grand-daughter, with PMHx of COPD (not on home oxygen), Afib on Eliquis, HFpEF (Echo 2021 EF 55-60%), prior DVT, DM, presenting with generalized weakness for 2 days. Pt is a poor historian--She attributed her weakness to the summer heat. Says her weakness worsened this morning, her symptoms worsened, which prompted her to go to her PMD. She was found to be hypoxic at her PMD Dr. Bianchi to 77%. She endorses leg swelling, but states that her leg swelling has been on and off for many years. Grand-daughter at bedside endorses that medications are usually given by patient's daughter to the patient, pt may have missed a few doses of Lasix. Endorses that family tries their best to have a salt restricted/fluid restricted diet. Papers from PMD office shows that pt is on Lasix 20 mg daily, was on Lasix 40 mg last month--unsure why dose was changed, family and patient unsure of the dosage as well. Pt endorses no chest pain, cough, sputum production, palpitations, cough, fever, abdominal pain, constipation, diarrhea, nausea, vomiting. She reports no increase in use of her nebulizer in the fast few days. No recent sick contacts, or travel.     The patient is awake, alert, laying comfortably in bed in mild distress. She is on RA. She is complaining of shortness of breath.        MEDICATIONS  (STANDING):  ALBUTerol    90 MICROgram(s) HFA Inhaler 1 Puff(s) Inhalation two times a day  apixaban 5 milliGRAM(s) Oral two times a day  aspirin  chewable 81 milliGRAM(s) Oral daily  azithromycin  IVPB      azithromycin  IVPB 500 milliGRAM(s) IV Intermittent every 24 hours  carvedilol 6.25 milliGRAM(s) Oral every 12 hours  cefTRIAXone   IVPB      cefTRIAXone   IVPB 1000 milliGRAM(s) IV Intermittent every 24 hours  ferrous    sulfate 325 milliGRAM(s) Oral daily  furosemide   Injectable 40 milliGRAM(s) IV Push daily  insulin lispro (ADMELOG) corrective regimen sliding scale   SubCutaneous Before meals and at bedtime  methylPREDNISolone sodium succinate Injectable 40 milliGRAM(s) IV Push every 8 hours  montelukast 10 milliGRAM(s) Oral daily  pantoprazole    Tablet 40 milliGRAM(s) Oral before breakfast  simvastatin 20 milliGRAM(s) Oral at bedtime      MEDICATIONS  (PRN):      Allergies    losartan (Angioedema)    Intolerances        PAST MEDICAL & SURGICAL HISTORY:  History of hypertension      Arthritis      Legally blind      Pre-diabetes      Breast cancer  right      COPD exacerbation      Atrial fibrillation      HF (heart failure)      No significant past surgical history          FAMILY HISTORY:      SOCIAL HISTORY  Smoking History:     REVIEW OF SYSTEMS:    CONSTITUTIONAL:  No fevers, chills, sweats    HEENT:  Eyes:  No diplopia or blurred vision. ENT:  No earache, sore throat or runny nose.    CARDIOVASCULAR:  No pressure, squeezing, tightness, or heaviness about the chest; no palpitations.    RESPIRATORY:  Per HPI    GASTROINTESTINAL:  No abdominal pain, nausea, vomiting or diarrhea.    GENITOURINARY:  No dysuria, frequency or urgency.    NEUROLOGIC:  No paresthesias, fasciculations, seizures or weakness.    PSYCHIATRIC:  No disorder of thought or mood.    Vital Signs Last 24 Hrs  T(C): 36.4 (2022 07:40), Max: 36.9 (2022 14:00)  T(F): 97.5 (2022 07:40), Max: 98.4 (2022 14:00)  HR: 69 (2022 07:40) (69 - 77)  BP: 142/83 (2022 07:40) (116/77 - 156/94)  BP(mean): --  RR: 19 (2022 07:40) (18 - 20)  SpO2: 97% (2022 07:40) (95% - 100%)    Parameters below as of 2022 07:40  Patient On (Oxygen Delivery Method): room air      I&O's Detail    2022 07:01  -  2022 07:00  --------------------------------------------------------  IN:  Total IN: 0 mL    OUT:    Voided (mL): 150 mL  Total OUT: 150 mL    Total NET: -150 mL          PHYSICAL EXAMINATION:    GENERAL: The patient is a well-developed, well-nourished _____in no apparent distress.     HEENT: Head is normocephalic and atraumatic. Extraocular muscles are intact. Mucous membranes are moist.     NECK: Supple.     LUNGS: Clear to auscultation without wheezing, rales, or rhonchi. Respirations unlabored    HEART: Regular rate and rhythm without murmur.    ABDOMEN: Soft, nontender, and nondistended.  No hepatosplenomegaly is noted.    EXTREMITIES: Without any cyanosis, clubbing, rash, lesions or edema.    NEUROLOGIC: Grossly intact.      LABS:                        14.0   4.77  )-----------( 246      ( 2022 06:25 )             46.3         135  |  99  |  16  ----------------------------<  190<H>  5.0   |  35<H>  |  0.98    Ca    8.9      2022 06:25  Phos  5.2       Mg     2.4         TPro  7.3  /  Alb  3.3<L>  /  TBili  0.6  /  DBili  x   /  AST  25  /  ALT  31  /  AlkPhos  142<H>        Urinalysis Basic - ( 2022 03:10 )    Color: Yellow / Appearance: Slightly Turbid / S.010 / pH: x  Gluc: x / Ketone: Negative  / Bili: Negative / Urobili: Negative   Blood: x / Protein: 15 / Nitrite: Negative   Leuk Esterase: Small / RBC: 2-5 /HPF / WBC 3-5 /HPF   Sq Epi: x / Non Sq Epi: Few /HPF / Bacteria: Moderate /HPF              Serum Pro-Brain Natriuretic Peptide: 2324 pg/mL (22 @ 15:30)          MICROBIOLOGY:    RADIOLOGY & ADDITIONAL STUDIES:    CXR:    Ct scan chest;    ekg;    echo: PULMONARY CONSULT NOTE      NUNO LAST  MRN-[id number]    History of Present Illness:  Reason for Admission: Weakness, SOB  History of Present Illness:   Pt is a 89 yo Female, from home, ambulates using walker/wheelchair, lives with grand-daughter, with PMHx of COPD (not on home oxygen), Afib on Eliquis, HFpEF (Echo 2021 EF 55-60%), prior DVT, DM, presenting with generalized weakness for 2 days. Pt is a poor historian--She attributed her weakness to the summer heat. Says her weakness worsened this morning, her symptoms worsened, which prompted her to go to her PMD. She was found to be hypoxic at her PMD Dr. Bianchi to 77%. She endorses leg swelling, but states that her leg swelling has been on and off for many years. Grand-daughter at bedside endorses that medications are usually given by patient's daughter to the patient, pt may have missed a few doses of Lasix. Endorses that family tries their best to have a salt restricted/fluid restricted diet. Papers from PMD office shows that pt is on Lasix 20 mg daily, was on Lasix 40 mg last month--unsure why dose was changed, family and patient unsure of the dosage as well. Pt endorses no chest pain, cough, sputum production, palpitations, cough, fever, abdominal pain, constipation, diarrhea, nausea, vomiting. She reports no increase in use of her nebulizer in the fast few days. No recent sick contacts, or travel.     The patient is awake, alert, laying comfortably in bed in No distress. She is on oxygen supp via [place]. She is complaining of shortness of breath.        MEDICATIONS  (STANDING):  ALBUTerol    90 MICROgram(s) HFA Inhaler 1 Puff(s) Inhalation two times a day  apixaban 5 milliGRAM(s) Oral two times a day  aspirin  chewable 81 milliGRAM(s) Oral daily  azithromycin  IVPB      azithromycin  IVPB 500 milliGRAM(s) IV Intermittent every 24 hours  carvedilol 6.25 milliGRAM(s) Oral every 12 hours  cefTRIAXone   IVPB      cefTRIAXone   IVPB 1000 milliGRAM(s) IV Intermittent every 24 hours  ferrous    sulfate 325 milliGRAM(s) Oral daily  furosemide   Injectable 40 milliGRAM(s) IV Push daily  insulin lispro (ADMELOG) corrective regimen sliding scale   SubCutaneous Before meals and at bedtime  methylPREDNISolone sodium succinate Injectable 40 milliGRAM(s) IV Push every 8 hours  montelukast 10 milliGRAM(s) Oral daily  pantoprazole    Tablet 40 milliGRAM(s) Oral before breakfast  simvastatin 20 milliGRAM(s) Oral at bedtime      MEDICATIONS  (PRN):      Allergies    losartan (Angioedema)    Intolerances        PAST MEDICAL & SURGICAL HISTORY:  History of hypertension      Arthritis      Legally blind      Pre-diabetes      Breast cancer  right      COPD exacerbation      Atrial fibrillation      HF (heart failure)      No significant past surgical history          FAMILY HISTORY:      SOCIAL HISTORY  Smoking History:     REVIEW OF SYSTEMS:    CONSTITUTIONAL:  No fevers, chills, sweats    HEENT:  Eyes:  No diplopia or blurred vision. ENT:  No earache, sore throat or runny nose.    CARDIOVASCULAR:  No pressure, squeezing, tightness, or heaviness about the chest; no palpitations.    RESPIRATORY:  Per HPI    GASTROINTESTINAL:  No abdominal pain, nausea, vomiting or diarrhea.    GENITOURINARY:  No dysuria, frequency or urgency.    NEUROLOGIC:  No paresthesias, fasciculations, seizures or weakness.    PSYCHIATRIC:  No disorder of thought or mood.    Vital Signs Last 24 Hrs  T(C): 36.4 (2022 07:40), Max: 36.9 (2022 14:00)  T(F): 97.5 (2022 07:40), Max: 98.4 (2022 14:00)  HR: 69 (2022 07:40) (69 - 77)  BP: 142/83 (2022 07:40) (116/77 - 156/94)  BP(mean): --  RR: 19 (2022 07:40) (18 - 20)  SpO2: 97% (2022 07:40) (95% - 100%)    Parameters below as of 2022 07:40  Patient On (Oxygen Delivery Method): room air      I&O's Detail    2022 07:01  -  2022 07:00  --------------------------------------------------------  IN:  Total IN: 0 mL    OUT:    Voided (mL): 150 mL  Total OUT: 150 mL    Total NET: -150 mL          PHYSICAL EXAMINATION:    GENERAL: The patient is a well-developed, well-nourished _____in no apparent distress.     HEENT: Head is normocephalic and atraumatic. Extraocular muscles are intact. Mucous membranes are moist.     NECK: Supple.     LUNGS: Clear to auscultation without wheezing, rales, or rhonchi. Respirations unlabored    HEART: Regular rate and rhythm without murmur.    ABDOMEN: Soft, nontender, and nondistended.  No hepatosplenomegaly is noted.    EXTREMITIES: Without any cyanosis, clubbing, rash, lesions + edema.    NEUROLOGIC: Grossly intact.      LABS:                        14.0   4.77  )-----------( 246      ( 2022 06:25 )             46.3         135  |  99  |  16  ----------------------------<  190<H>  5.0   |  35<H>  |  0.98    Ca    8.9      2022 06:25  Phos  5.2       Mg     2.4         TPro  7.3  /  Alb  3.3<L>  /  TBili  0.6  /  DBili  x   /  AST  25  /  ALT  31  /  AlkPhos  142<H>        Urinalysis Basic - ( 2022 03:10 )    Color: Yellow / Appearance: Slightly Turbid / S.010 / pH: x  Gluc: x / Ketone: Negative  / Bili: Negative / Urobili: Negative   Blood: x / Protein: 15 / Nitrite: Negative   Leuk Esterase: Small / RBC: 2-5 /HPF / WBC 3-5 /HPF   Sq Epi: x / Non Sq Epi: Few /HPF / Bacteria: Moderate /HPF              Serum Pro-Brain Natriuretic Peptide: 2324 pg/mL (22 @ 15:30)          MICROBIOLOGY:    RADIOLOGY & ADDITIONAL STUDIES:    CXR:  < from: Xray Chest 1 View- PORTABLE-Urgent (22 @ 15:55) >    IMPRESSION:  1. Ill-defined airspace opacities bilaterally which may be infectious or   related to CHF and or not present previously.  2. Cardiomegaly with pulmonary venous engorgement.  3. Elevated right hemidiaphragm, unchanged.    < end of copied text >    Ct scan chest;    ekg;    echo:

## 2022-07-29 NOTE — PROGRESS NOTE ADULT - PROBLEM SELECTOR PLAN 1
Pt p/w hypoxia to 77% at PMD's office, noted to have worsening lower extremity swelling, difficulty breathing  Possible due to medication non-compliance, lasix dose recently reduced outpatient   - 92% on 3L, will maintain O2 sats 88-92% given COPD hx, prevent over oxygenation  - Xray : Ill-defined airspace opacities bilaterally which may be infectious or related to CHF and or not present previously. Cardiomegaly with pulmonary venous engorgement.  - Although pt appears to have a more sedentary lifestyle, doubt PE in the setting of Eliquis use at home  - EKG 7/28 - afib  - Trop: 197.5> f/u 2nd trop  - BNP: 2324 (from 750 last admission in Nov)  - s/p 2 doses of 20 IV Lasix  - started on lasix 40 IV BID starting tomorrow, likely in the setting of CHF exacerbation  - strict I&Os, daily weights  - trend troponins  - f/u CTA chest  - cardiology consult - Dr. Siu

## 2022-07-29 NOTE — CONSULT NOTE ADULT - TIME BILLING
- Review of records, telemetry, vital signs and daily labs.   - General and cardiovascular physical examination.  - Generation of cardiovascular treatment plan.  - Coordination of care.      Patient was seen and examined by me on 7/29/22interim events noted,labs and radiology studies reviewed.  Chaparro Denton MD,FACC.  [address] [place], [place] [id number].  [phone]

## 2022-07-29 NOTE — PROGRESS NOTE ADULT - ASSESSMENT
Pt is a 89 yo Female, from home, ambulates using walker/wheelchair, lives with grand-daughter, with PMHx of COPD (not on home oxygen), Afib on Eliquis, HFpEF (Echo Nov 2021 EF 55-60%), prior DVT, DM, presenting with generalized weakness for 2 days, with worsening leg swelling and difficulty breathing, admitted for Acute hypoxic respiratory failure likely in the setting of CHF exacerbation  PENDING CARDIOLOGY AND PULM

## 2022-07-29 NOTE — CONSULT NOTE ADULT - ASSESSMENT
· Assessment	  Pt is a 89 yo Female, from home, ambulates using walker/wheelchair, lives with grand-daughter, with PMHx of COPD (not on home oxygen), Afib on Eliquis, HFpEF (Echo Nov 2021 EF 55-60%), prior DVT, DM, presenting with generalized weakness for 2 days, with worsening leg swelling and difficulty breathing, admitted for Acute hypoxic respiratory failure likely in the setting of CHF exacerbation     Problem/Plan - 1:  ·  Problem: Acute respiratory failure with hypoxia.   ·  Plan: Pt p/w hypoxia to 77% at PMD's office, noted to have worsening lower extremity swelling, difficulty breathing  Possible due to medication non-compliance, lasix dose recently reduced outpatient   - 92% on 3L, will maintain O2 sats 88-92% given COPD hx, prevent over oxygenation  - Xray : Ill-defined airspace opacities bilaterally which may be infectious or related to CHF and or not present previously. Cardiomegaly with pulmonary venous engorgement.  - Although pt appears to have a more sedentary lifestyle, doubt PE in the setting of Eliquis use at home  - EKG 7/28 - afib  - Trop: 197.5> f/u 2nd trop  - BNP: 2324 (from 750 last admission in Nov)  - s/p 2 doses of 20 IV Lasix  - started on lasix 40 IV BID starting tomorrow, likely in the setting of CHF exacerbation  - strict I&Os, daily weights  - trend troponins  - f/u CTA chest       Problem/Plan - 2:  ·  Problem: Acute on chronic diastolic congestive heart failure.   ·  Plan: As above.     Problem/Plan - 3:  ·  Problem: Atrial fibrillation.   ·  Plan: PT has hx of Afib, rate controlled, on Eliquis and Coreg   C/w home medications.     · Assessment	  Pt is a 89 yo Female, from home, ambulates using walker/wheelchair, lives with grand-daughter, with PMHx of COPD (not on home oxygen), Afib on Eliquis, HFpEF (Echo Nov 2021 EF 55-60%), prior DVT, DM, presenting with generalized weakness for 2 days, with worsening leg swelling and difficulty breathing, admitted for Acute hypoxic respiratory failure likely in the setting of CHF exacerbation     Problem/Plan - 1:  ·  Problem: Acute respiratory failure with hypoxia.   ·  Plan: Pt p/w hypoxia to 77% at PMD's office, noted to have worsening lower extremity swelling, difficulty breathing  Possible due to medication non-compliance, lasix dose recently reduced outpatient   - BNP: 2324 (from 750 last admission in Nov)  - continue  on lasix 40 IV BID   - strict I&Os, daily weights  - f/u echo       Problem/Plan - 2:  ·  Problem: Acute on chronic diastolic congestive heart failure.   ·  Plan: As above.     Problem/Plan - 3:  ·  Problem: Atrial fibrillation.   ·  Plan: PT has hx of Afib, rate controlled, on Eliquis and Coreg   C/w home medications.

## 2022-07-29 NOTE — PATIENT PROFILE ADULT - FALL HARM RISK - HARM RISK INTERVENTIONS
Assistance with ambulation/Assistance OOB with selected safe patient handling equipment/Communicate Risk of Fall with Harm to all staff/Discuss with provider need for PT consult/Monitor gait and stability/Reinforce activity limits and safety measures with patient and family/Tailored Fall Risk Interventions/Visual Cue: Yellow wristband and red socks/Bed in lowest position, wheels locked, appropriate side rails in place/Call bell, personal items and telephone in reach/Instruct patient to call for assistance before getting out of bed or chair/Non-slip footwear when patient is out of bed/[place] to call system/Physically safe environment - no spills, clutter or unnecessary equipment/Purposeful Proactive Rounding/Room/bathroom lighting operational, light cord in reach

## 2022-07-29 NOTE — PROGRESS NOTE ADULT - ASSESSMENT
Pt is a 89 yo Female, from home, ambulates using walker/wheelchair, lives with grand-daughter, with PMHx of COPD (not on home oxygen), Afib on Eliquis, HFpEF (Echo Nov 2021 EF 55-60%), prior DVT, DM, presenting with generalized weakness for 2 days, with worsening leg swelling and difficulty breathing, admitted for Acute hypoxic respiratory failure likely in the setting of CHF exacerbation

## 2022-07-29 NOTE — PROGRESS NOTE ADULT - PROBLEM SELECTOR PLAN 4
Pt has hx of COPD, uses Albuterol inhaler PRN, Singulair, Nebs as needed at home  S/p Nebulizer treatment in ED, 125 mg IVP Solumedrol, pt still noted to have mild wheezing  Continue home inhalers

## 2022-07-30 LAB
ALBUMIN SERPL ELPH-MCNC: 3.1 G/DL — LOW (ref 3.5–5)
ALP SERPL-CCNC: 129 U/L — HIGH (ref 40–120)
ALT FLD-CCNC: 31 U/L DA — SIGNIFICANT CHANGE UP (ref 10–60)
ANION GAP SERPL CALC-SCNC: 3 MMOL/L — LOW (ref 5–17)
AST SERPL-CCNC: 17 U/L — SIGNIFICANT CHANGE UP (ref 10–40)
BILIRUB SERPL-MCNC: 0.4 MG/DL — SIGNIFICANT CHANGE UP (ref 0.2–1.2)
BUN SERPL-MCNC: 23 MG/DL — HIGH (ref 7–18)
CALCIUM SERPL-MCNC: 8.9 MG/DL — SIGNIFICANT CHANGE UP (ref 8.4–10.5)
CHLORIDE SERPL-SCNC: 98 MMOL/L — SIGNIFICANT CHANGE UP (ref 96–108)
CO2 SERPL-SCNC: 38 MMOL/L — HIGH (ref 22–31)
CREAT SERPL-MCNC: 0.94 MG/DL — SIGNIFICANT CHANGE UP (ref 0.5–1.3)
EGFR: 58 ML/MIN/1.73M2 — LOW
GLUCOSE BLDC GLUCOMTR-MCNC: 132 MG/DL — HIGH (ref 70–99)
GLUCOSE BLDC GLUCOMTR-MCNC: 173 MG/DL — HIGH (ref 70–99)
GLUCOSE BLDC GLUCOMTR-MCNC: 180 MG/DL — HIGH (ref 70–99)
GLUCOSE BLDC GLUCOMTR-MCNC: 198 MG/DL — HIGH (ref 70–99)
GLUCOSE SERPL-MCNC: 126 MG/DL — HIGH (ref 70–99)
HCT VFR BLD CALC: 39.7 % — SIGNIFICANT CHANGE UP (ref 34.5–45)
HGB BLD-MCNC: 12.3 G/DL — SIGNIFICANT CHANGE UP (ref 11.5–15.5)
MAGNESIUM SERPL-MCNC: 2.4 MG/DL — SIGNIFICANT CHANGE UP (ref 1.6–2.6)
MCHC RBC-ENTMCNC: 30.6 PG — SIGNIFICANT CHANGE UP (ref 27–34)
MCHC RBC-ENTMCNC: 31 GM/DL — LOW (ref 32–36)
MCV RBC AUTO: 98.8 FL — SIGNIFICANT CHANGE UP (ref 80–100)
NRBC # BLD: 0 /100 WBCS — SIGNIFICANT CHANGE UP (ref 0–0)
PHOSPHATE SERPL-MCNC: 3.5 MG/DL — SIGNIFICANT CHANGE UP (ref 2.5–4.5)
PLATELET # BLD AUTO: 230 K/UL — SIGNIFICANT CHANGE UP (ref 150–400)
POTASSIUM SERPL-MCNC: 4.5 MMOL/L — SIGNIFICANT CHANGE UP (ref 3.5–5.3)
POTASSIUM SERPL-SCNC: 4.5 MMOL/L — SIGNIFICANT CHANGE UP (ref 3.5–5.3)
PROCALCITONIN SERPL-MCNC: 0.05 NG/ML — SIGNIFICANT CHANGE UP (ref 0.02–0.1)
PROT SERPL-MCNC: 7.2 G/DL — SIGNIFICANT CHANGE UP (ref 6–8.3)
RBC # BLD: 4.02 M/UL — SIGNIFICANT CHANGE UP (ref 3.8–5.2)
RBC # FLD: 13.5 % — SIGNIFICANT CHANGE UP (ref 10.3–14.5)
SODIUM SERPL-SCNC: 139 MMOL/L — SIGNIFICANT CHANGE UP (ref 135–145)
WBC # BLD: 7.98 K/UL — SIGNIFICANT CHANGE UP (ref 3.8–10.5)
WBC # FLD AUTO: 7.98 K/UL — SIGNIFICANT CHANGE UP (ref 3.8–10.5)

## 2022-07-30 RX ADMIN — Medication 1: at 21:51

## 2022-07-30 RX ADMIN — ALBUTEROL 1 PUFF(S): 90 AEROSOL, METERED ORAL at 12:06

## 2022-07-30 RX ADMIN — PANTOPRAZOLE SODIUM 40 MILLIGRAM(S): 20 TABLET, DELAYED RELEASE ORAL at 07:13

## 2022-07-30 RX ADMIN — Medication 40 MILLIGRAM(S): at 07:14

## 2022-07-30 RX ADMIN — SIMVASTATIN 20 MILLIGRAM(S): 20 TABLET, FILM COATED ORAL at 21:51

## 2022-07-30 RX ADMIN — MONTELUKAST 10 MILLIGRAM(S): 4 TABLET, CHEWABLE ORAL at 12:06

## 2022-07-30 RX ADMIN — Medication 1: at 17:16

## 2022-07-30 RX ADMIN — Medication 1: at 12:06

## 2022-07-30 RX ADMIN — CARVEDILOL PHOSPHATE 6.25 MILLIGRAM(S): 80 CAPSULE, EXTENDED RELEASE ORAL at 17:14

## 2022-07-30 RX ADMIN — Medication 325 MILLIGRAM(S): at 12:05

## 2022-07-30 RX ADMIN — CARVEDILOL PHOSPHATE 6.25 MILLIGRAM(S): 80 CAPSULE, EXTENDED RELEASE ORAL at 07:13

## 2022-07-30 RX ADMIN — APIXABAN 5 MILLIGRAM(S): 2.5 TABLET, FILM COATED ORAL at 07:13

## 2022-07-30 RX ADMIN — APIXABAN 5 MILLIGRAM(S): 2.5 TABLET, FILM COATED ORAL at 17:14

## 2022-07-30 RX ADMIN — Medication 40 MILLIGRAM(S): at 14:30

## 2022-07-30 RX ADMIN — AZITHROMYCIN 255 MILLIGRAM(S): 500 TABLET, FILM COATED ORAL at 21:50

## 2022-07-30 RX ADMIN — ALBUTEROL 1 PUFF(S): 90 AEROSOL, METERED ORAL at 21:52

## 2022-07-30 RX ADMIN — Medication 40 MILLIGRAM(S): at 07:13

## 2022-07-30 RX ADMIN — CEFTRIAXONE 100 MILLIGRAM(S): 500 INJECTION, POWDER, FOR SOLUTION INTRAMUSCULAR; INTRAVENOUS at 21:50

## 2022-07-30 RX ADMIN — Medication 1 DROP(S): at 17:16

## 2022-07-30 RX ADMIN — Medication 81 MILLIGRAM(S): at 12:05

## 2022-07-30 NOTE — PROGRESS NOTE ADULT - SUBJECTIVE AND OBJECTIVE BOX
PGY 3 Note discussed with primary attending    Patient is a 89y old  Female who presents with a chief complaint of Weakness, SOB (2022 09:41)      INTERVAL HPI/OVERNIGHT EVENTS: Patient seen and examined at bedside, lower extremity swelling improving. Mild wheeze, no respiratory distress    MEDICATIONS  (STANDING):  ALBUTerol    90 MICROgram(s) HFA Inhaler 1 Puff(s) Inhalation two times a day  apixaban 5 milliGRAM(s) Oral two times a day  aspirin  chewable 81 milliGRAM(s) Oral daily  azithromycin  IVPB      azithromycin  IVPB 500 milliGRAM(s) IV Intermittent every 24 hours  carvedilol 6.25 milliGRAM(s) Oral every 12 hours  cefTRIAXone   IVPB      cefTRIAXone   IVPB 1000 milliGRAM(s) IV Intermittent every 24 hours  ferrous    sulfate 325 milliGRAM(s) Oral daily  furosemide   Injectable 40 milliGRAM(s) IV Push two times a day  insulin lispro (ADMELOG) corrective regimen sliding scale   SubCutaneous Before meals and at bedtime  methylPREDNISolone sodium succinate Injectable 40 milliGRAM(s) IV Push daily  montelukast 10 milliGRAM(s) Oral daily  pantoprazole    Tablet 40 milliGRAM(s) Oral before breakfast  simvastatin 20 milliGRAM(s) Oral at bedtime    MEDICATIONS  (PRN):      __________________________________________________  REVIEW OF SYSTEMS:  RESPIRATORY: No cough; No shortness of breath  CARDIOVASCULAR: No chest pain, no palpitations  GASTROINTESTINAL: No pain. No nausea or vomiting; No diarrhea       Vital Signs Last 24 Hrs  T(C): 36.5 (2022 07:32), Max: 37.5 (2022 16:04)  T(F): 97.7 (2022 07:32), Max: 99.5 (2022 16:04)  HR: 83 (2022 07:32) (68 - 83)  BP: 119/64 (2022 07:32) (109/70 - 127/78)  BP(mean): --  RR: 19 (2022 07:32) (18 - 19)  SpO2: 96% (2022 07:32) (96% - 100%)    Parameters below as of 2022 07:32  Patient On (Oxygen Delivery Method): nasal cannula  O2 Flow (L/min): 2      ________________________________________________  PHYSICAL EXAM:  GENERAL: NAD  HEENT: Normocephalic;  conjunctivae and sclerae clear; moist mucous membranes;   NECK : supple  CHEST/LUNG: Clear to auscultation bilaterally with good air entry   HEART: S1 S2  regular; no murmurs, gallops or rubs  ABDOMEN: Soft, Nontender, Nondistended; Bowel sounds present  EXTREMITIES: No cyanosis; no edema; no calf tenderness  NERVOUS SYSTEM:  Awake and alert; Oriented to place, person and time ; no new deficits    _________________________________________________  LABS:                        12.3   7.98  )-----------( 230      ( 2022 06:55 )             39.7         139  |  98  |  23<H>  ----------------------------<  126<H>  4.5   |  38<H>  |  0.94    Ca    8.9      2022 06:55  Phos  3.5       Mg     2.4         TPro  7.2  /  Alb  3.1<L>  /  TBili  0.4  /  DBili  x   /  AST  17  /  ALT  31  /  AlkPhos  129<H>  30      Urinalysis Basic - ( 2022 03:10 )    Color: Yellow / Appearance: Slightly Turbid / S.010 / pH: x  Gluc: x / Ketone: Negative  / Bili: Negative / Urobili: Negative   Blood: x / Protein: 15 / Nitrite: Negative   Leuk Esterase: Small / RBC: 2-5 /HPF / WBC 3-5 /HPF   Sq Epi: x / Non Sq Epi: Few /HPF / Bacteria: Moderate /HPF      CAPILLARY BLOOD GLUCOSE      POCT Blood Glucose.: 132 mg/dL (2022 07:46)  POCT Blood Glucose.: 137 mg/dL (2022 21:15)  POCT Blood Glucose.: 173 mg/dL (2022 16:58)  POCT Blood Glucose.: 192 mg/dL (2022 11:37)        RADIOLOGY & ADDITIONAL TESTS:    Imaging Personally Reviewed:  YES    Consultant(s) Notes Reviewed:   YES    Care Discussed with Consultants : YES    Plan of care was discussed with patient and /or primary care giver; all questions and concerns were addressed and care was aligned with patient's wishes.     PGY 3 Note discussed with primary attending    Patient is a 89y old  Female who presents with a chief complaint of Weakness, SOB (2022 09:41)      INTERVAL HPI/OVERNIGHT EVENTS: Patient seen and examined at bedside, lower extremity swelling improving. Mild wheeze, no respiratory distress    MEDICATIONS  (STANDING):  ALBUTerol    90 MICROgram(s) HFA Inhaler 1 Puff(s) Inhalation two times a day  apixaban 5 milliGRAM(s) Oral two times a day  aspirin  chewable 81 milliGRAM(s) Oral daily  azithromycin  IVPB      azithromycin  IVPB 500 milliGRAM(s) IV Intermittent every 24 hours  carvedilol 6.25 milliGRAM(s) Oral every 12 hours  cefTRIAXone   IVPB      cefTRIAXone   IVPB 1000 milliGRAM(s) IV Intermittent every 24 hours  ferrous    sulfate 325 milliGRAM(s) Oral daily  furosemide   Injectable 40 milliGRAM(s) IV Push two times a day  insulin lispro (ADMELOG) corrective regimen sliding scale   SubCutaneous Before meals and at bedtime  methylPREDNISolone sodium succinate Injectable 40 milliGRAM(s) IV Push daily  montelukast 10 milliGRAM(s) Oral daily  pantoprazole    Tablet 40 milliGRAM(s) Oral before breakfast  simvastatin 20 milliGRAM(s) Oral at bedtime    MEDICATIONS  (PRN):      __________________________________________________  REVIEW OF SYSTEMS:    CONSTITUTIONAL: No weakness, fevers or chills  EYES/ENT: No visual changes;  No vertigo or throat pain   NECK: No pain or stiffness  RESPIRATORY: No cough, wheezing, hemoptysis; No shortness of breath  CARDIOVASCULAR: No chest pain or palpitations  GASTROINTESTINAL: No abdominal or epigastric pain. No nausea, vomiting, or hematemesis; No diarrhea or constipation. No melena or hematochezia.  GENITOURINARY: No dysuria, frequency or hematuria  NEUROLOGICAL: No numbness or weakness  SKIN: No itching, burning, rashes, or lesions   All other review of systems is negative unless indicated above.    Vital Signs Last 24 Hrs  T(C): 36.5 (2022 07:32), Max: 37.5 (2022 16:04)  T(F): 97.7 (2022 07:32), Max: 99.5 (2022 16:04)  HR: 83 (2022 07:32) (68 - 83)  BP: 119/64 (2022 07:32) (109/70 - 127/78)  BP(mean): --  RR: 19 (2022 07:32) (18 - 19)  SpO2: 96% (2022 07:32) (96% - 100%)    Parameters below as of 2022 07:32  Patient On (Oxygen Delivery Method): nasal cannula  O2 Flow (L/min): 2      ________________________________________________  PHYSICAL EXAM:  GENERAL: obese elderly female, not in acute distress  HEENT: Normocephalic;  conjunctivae and sclerae clear; moist mucous membranes;   NECK : supple  CHEST/LUNG: limited exam due to body habitus, mild wheezing+  HEART: S1 S2  regular; no murmurs, gallops or rubs  ABDOMEN: Soft, Nontender, Nondistended; Bowel sounds present  EXTREMITIES: 2+pitting edema, improving  NERVOUS SYSTEM:  Awake and alert; Oriented to place, person and time ; no new deficits    _________________________________________________  LABS:                        12.3   7.98  )-----------( 230      ( 2022 06:55 )             39.7     07-30    139  |  98  |  23<H>  ----------------------------<  126<H>  4.5   |  38<H>  |  0.94    Ca    8.9      2022 06:55  Phos  3.5     0730  Mg     2.4     30    TPro  7.2  /  Alb  3.1<L>  /  TBili  0.4  /  DBili  x   /  AST  17  /  ALT  31  /  AlkPhos  129<H>  0730      Urinalysis Basic - ( 2022 03:10 )    Color: Yellow / Appearance: Slightly Turbid / S.010 / pH: x  Gluc: x / Ketone: Negative  / Bili: Negative / Urobili: Negative   Blood: x / Protein: 15 / Nitrite: Negative   Leuk Esterase: Small / RBC: 2-5 /HPF / WBC 3-5 /HPF   Sq Epi: x / Non Sq Epi: Few /HPF / Bacteria: Moderate /HPF      CAPILLARY BLOOD GLUCOSE      POCT Blood Glucose.: 132 mg/dL (2022 07:46)  POCT Blood Glucose.: 137 mg/dL (2022 21:15)  POCT Blood Glucose.: 173 mg/dL (2022 16:58)  POCT Blood Glucose.: 192 mg/dL (2022 11:37)        RADIOLOGY & ADDITIONAL TESTS:    Imaging Personally Reviewed:  YES    Consultant(s) Notes Reviewed:   YES    Care Discussed with Consultants : YES    Plan of care was discussed with patient and /or primary care giver; all questions and concerns were addressed and care was aligned with patient's wishes.

## 2022-07-30 NOTE — PROGRESS NOTE ADULT - ASSESSMENT
Pt is a 89 yo Female, from home, ambulates using walker/wheelchair, lives with grand-daughter, with PMHx of COPD (not on home oxygen), Afib on Eliquis, HFpEF (Echo Nov 2021 EF 55-60%), prior DVT, DM, presenting with generalized weakness for 2 days, with worsening leg swelling and difficulty breathing, admitted for Acute hypoxic respiratory failure likely in the setting of CHF exacerbation

## 2022-07-30 NOTE — PROGRESS NOTE ADULT - ASSESSMENT
Patient is a 89y old  Female from home, ambulates using walker/wheelchair, lives with grand-daughter, with PMHx of COPD (not on home oxygen), Afib on Eliquis, HFpEF (Echo Nov 2021 EF 55-60%), prior DVT, DM, presents to the ER for evaluation of generalized weakness for 2 days. She went to see her PMD and found to be hypoxic at her PMD, Dr. Bianchi's office,  to 77%. She endorses leg swelling, but states that her leg swelling has been on and off for many years. On admission, she found to have no fever, no Leukocytosis but CXR shows  Ill-defined airspace opacities bilaterally which may be infectious or related to CHF and or not present previously. She has started on Ceftriaxone and Azithromycin, and the ID consult requested to assist with further evaluation and antibiotic management.     # Pneumonia  - on CXR  # CHF exacerbation    would recommend:    1. Please obtain MRSA PCR and Legionella urine Ag  2. Obtain Procalcitonin level  3. Diuresis as per Primary/cardiology team  4. Continue Ceftriaxone and Azithromycin until work up is done  5. Keep Both LE elevated  6. Supplemental oxygenation and bronchodilator as needed      Attending Attestation:  Time-based billing (NON-critical care).   Spent more than 45 minutes on total encounter, more than 50 % of the visit was spent counseling and/or coordinating care by the Attending physician.  The necessity of the time spent during the encounter on this date of service is due to complexity of:    - Pneumonia  - CHF exacerbation  - Hypoxia- on supplemental oxygenation

## 2022-07-30 NOTE — PROGRESS NOTE ADULT - PROBLEM SELECTOR PLAN 4
Pt has hx of COPD, uses Albuterol inhaler PRN, Singulair, Nebs as needed at home  Steroids being titrated to Qdaily, will stop steroids IV 7/31  Continue home inhalers

## 2022-07-30 NOTE — PROGRESS NOTE ADULT - PROBLEM SELECTOR PLAN 3
US doppler   Compression stockings  lasix trial. US doppler neg for DVT  Compression stockings  lasix trial.

## 2022-07-30 NOTE — PROGRESS NOTE ADULT - SUBJECTIVE AND OBJECTIVE BOX
Patient is a 89y old  Female who presents with a chief complaint of Weakness, SOB (2022 04:28)    Patient is awake, alert laying comfortably in the bed in no acute distress. Patient is feeling better and was moving around yesterday.      INTERVAL HPI/OVERNIGHT EVENTS:      VITAL SIGNS:  T(F): 97.7 (22 @ 07:32)  HR: 83 (22 @ 07:32)  BP: 119/64 (22 @ 07:32)  RR: 19 (22 @ 07:32)  SpO2: 96% (22 @ 07:32)  Wt(kg): --  I&O's Detail          REVIEW OF SYSTEMS:    CONSTITUTIONAL:  No fevers, chills, sweats    HEENT:  Eyes:  No diplopia or blurred vision. ENT:  No earache, sore throat or runny nose.    CARDIOVASCULAR:  No pressure, squeezing, tightness, or heaviness about the chest; no palpitations.    RESPIRATORY:  Per HPI    GASTROINTESTINAL:  No abdominal pain, nausea, vomiting or diarrhea.    GENITOURINARY:  No dysuria, frequency or urgency.    NEUROLOGIC:  No paresthesias, fasciculations, seizures or weakness.    PSYCHIATRIC:  No disorder of thought or mood.      PHYSICAL EXAM:    Constitutional: Well developed and nourished  Eyes:Perrla  ENMT: normal  Neck:supple  Respiratory: good air entry  Cardiovascular: S1 S2 regular  Gastrointestinal: Soft, Non tender  Extremities: No edema  Vascular:normal  Neurological:Awake, alert,Ox3  Musculoskeletal:Normal      MEDICATIONS  (STANDING):  ALBUTerol    90 MICROgram(s) HFA Inhaler 1 Puff(s) Inhalation two times a day  apixaban 5 milliGRAM(s) Oral two times a day  aspirin  chewable 81 milliGRAM(s) Oral daily  azithromycin  IVPB      azithromycin  IVPB 500 milliGRAM(s) IV Intermittent every 24 hours  carvedilol 6.25 milliGRAM(s) Oral every 12 hours  cefTRIAXone   IVPB      cefTRIAXone   IVPB 1000 milliGRAM(s) IV Intermittent every 24 hours  ferrous    sulfate 325 milliGRAM(s) Oral daily  furosemide   Injectable 40 milliGRAM(s) IV Push two times a day  insulin lispro (ADMELOG) corrective regimen sliding scale   SubCutaneous Before meals and at bedtime  methylPREDNISolone sodium succinate Injectable 40 milliGRAM(s) IV Push daily  montelukast 10 milliGRAM(s) Oral daily  pantoprazole    Tablet 40 milliGRAM(s) Oral before breakfast  simvastatin 20 milliGRAM(s) Oral at bedtime    MEDICATIONS  (PRN):      Allergies    losartan (Angioedema)    Intolerances        LABS:                        12.3   7.98  )-----------( 230      ( 2022 06:55 )             39.7     07-30    139  |  98  |  23<H>  ----------------------------<  126<H>  4.5   |  38<H>  |  0.94    Ca    8.9      2022 06:55  Phos  3.5       Mg     2.4         TPro  7.2  /  Alb  3.1<L>  /  TBili  0.4  /  DBili  x   /  AST  17  /  ALT  31  /  AlkPhos  129<H>        Urinalysis Basic - ( 2022 03:10 )    Color: Yellow / Appearance: Slightly Turbid / S.010 / pH: x  Gluc: x / Ketone: Negative  / Bili: Negative / Urobili: Negative   Blood: x / Protein: 15 / Nitrite: Negative   Leuk Esterase: Small / RBC: 2-5 /HPF / WBC 3-5 /HPF   Sq Epi: x / Non Sq Epi: Few /HPF / Bacteria: Moderate /HPF            CAPILLARY BLOOD GLUCOSE      POCT Blood Glucose.: 132 mg/dL (2022 07:46)  POCT Blood Glucose.: 137 mg/dL (2022 21:15)  POCT Blood Glucose.: 173 mg/dL (2022 16:58)  POCT Blood Glucose.: 192 mg/dL (2022 11:37)    pro-bnp 2324  @ 15:30     d-dimer --   @ 15:30      RADIOLOGY & ADDITIONAL TESTS:    CXR:  < from: US Duplex Venous Lower Ext Complete, Bilateral (22 @ 18:46) >    IMPRESSION:  No evidence of deep venous thrombosis in either lower extremity.          --- End of Report ---      < end of copied text >      < from: CT Angio Chest PE Protocol w/ IV Cont (22 @ 22:52) >  IMPRESSION:  Limited evaluation for segmental and subsegmental pulmonary embolism. No   main, left right, or lobar pulmonary embolism.    --- End of Report ---      < end of copied text >      < from: Xray Chest 1 View- PORTABLE-Urgent (22 @ 15:55) >    IMPRESSION:  1. Ill-defined airspace opacities bilaterally which may be infectious or   related to CHF and or not present previously.  2. Cardiomegaly with pulmonary venous engorgement.  3. Elevated right hemidiaphragm, unchanged.    --- End of Report ---    < end of copied text >    < from: 12 Lead ECG (22 @ 14:18) >  Diagnosis Line Atrial fibrillation  Low voltage QRS  Septal infarct , age undetermined  Abnormal ECG    < end of copied text >     Patient is a 89y old  Female who presents with a chief complaint of Weakness, SOB (2022 04:28)    Patient is awake, alert,  laying comfortably in the bed in no acute distress. Patient is feeling better and is on RA now.      INTERVAL HPI/OVERNIGHT EVENTS:      VITAL SIGNS:  T(F): 97.7 (22 @ 07:32)  HR: 83 (22 @ 07:32)  BP: 119/64 (22 @ 07:32)  RR: 19 (22 @ 07:32)  SpO2: 96% (22 @ 07:32)  Wt(kg): --  I&O's Detail          REVIEW OF SYSTEMS:    CONSTITUTIONAL:  No fevers, chills, sweats    HEENT:  Eyes:  No diplopia or blurred vision. ENT:  No earache, sore throat or runny nose.    CARDIOVASCULAR:  No pressure, squeezing, tightness, or heaviness about the chest; no palpitations.    RESPIRATORY:  Per HPI    GASTROINTESTINAL:  No abdominal pain, nausea, vomiting or diarrhea.    GENITOURINARY:  No dysuria, frequency or urgency.    NEUROLOGIC:  No paresthesias, fasciculations, seizures or weakness.    PSYCHIATRIC:  No disorder of thought or mood.      PHYSICAL EXAM:    Constitutional: Well developed and nourished  Eyes:Perrla  ENMT: normal  Neck:supple  Respiratory: good air entry  Cardiovascular: S1 S2 regular  Gastrointestinal: Soft, Non tender  Extremities: No edema  Vascular:normal  Neurological:Awake, alert,Ox3  Musculoskeletal:Normal      MEDICATIONS  (STANDING):  ALBUTerol    90 MICROgram(s) HFA Inhaler 1 Puff(s) Inhalation two times a day  apixaban 5 milliGRAM(s) Oral two times a day  aspirin  chewable 81 milliGRAM(s) Oral daily  azithromycin  IVPB      azithromycin  IVPB 500 milliGRAM(s) IV Intermittent every 24 hours  carvedilol 6.25 milliGRAM(s) Oral every 12 hours  cefTRIAXone   IVPB      cefTRIAXone   IVPB 1000 milliGRAM(s) IV Intermittent every 24 hours  ferrous    sulfate 325 milliGRAM(s) Oral daily  furosemide   Injectable 40 milliGRAM(s) IV Push two times a day  insulin lispro (ADMELOG) corrective regimen sliding scale   SubCutaneous Before meals and at bedtime  methylPREDNISolone sodium succinate Injectable 40 milliGRAM(s) IV Push daily  montelukast 10 milliGRAM(s) Oral daily  pantoprazole    Tablet 40 milliGRAM(s) Oral before breakfast  simvastatin 20 milliGRAM(s) Oral at bedtime    MEDICATIONS  (PRN):      Allergies    losartan (Angioedema)    Intolerances        LABS:                        12.3   7.98  )-----------( 230      ( 2022 06:55 )             39.7     07-30    139  |  98  |  23<H>  ----------------------------<  126<H>  4.5   |  38<H>  |  0.94    Ca    8.9      2022 06:55  Phos  3.5       Mg     2.4         TPro  7.2  /  Alb  3.1<L>  /  TBili  0.4  /  DBili  x   /  AST  17  /  ALT  31  /  AlkPhos  129<H>        Urinalysis Basic - ( 2022 03:10 )    Color: Yellow / Appearance: Slightly Turbid / S.010 / pH: x  Gluc: x / Ketone: Negative  / Bili: Negative / Urobili: Negative   Blood: x / Protein: 15 / Nitrite: Negative   Leuk Esterase: Small / RBC: 2-5 /HPF / WBC 3-5 /HPF   Sq Epi: x / Non Sq Epi: Few /HPF / Bacteria: Moderate /HPF            CAPILLARY BLOOD GLUCOSE      POCT Blood Glucose.: 132 mg/dL (2022 07:46)  POCT Blood Glucose.: 137 mg/dL (2022 21:15)  POCT Blood Glucose.: 173 mg/dL (2022 16:58)  POCT Blood Glucose.: 192 mg/dL (2022 11:37)    pro-bnp 2324  @ 15:30     d-dimer --   @ 15:30      RADIOLOGY & ADDITIONAL TESTS:    CXR:  < from: US Duplex Venous Lower Ext Complete, Bilateral (22 @ 18:46) >    IMPRESSION:  No evidence of deep venous thrombosis in either lower extremity.          --- End of Report ---      < end of copied text >      < from: CT Angio Chest PE Protocol w/ IV Cont (22 @ 22:52) >  IMPRESSION:  Limited evaluation for segmental and subsegmental pulmonary embolism. No   main, left right, or lobar pulmonary embolism.    --- End of Report ---      < end of copied text >      < from: Xray Chest 1 View- PORTABLE-Urgent (22 @ 15:55) >    IMPRESSION:  1. Ill-defined airspace opacities bilaterally which may be infectious or   related to CHF and or not present previously.  2. Cardiomegaly with pulmonary venous engorgement.  3. Elevated right hemidiaphragm, unchanged.    --- End of Report ---    < end of copied text >    < from: 12 Lead ECG (22 @ 14:18) >  Diagnosis Line Atrial fibrillation  Low voltage QRS  Septal infarct , age undetermined  Abnormal ECG    < end of copied text >    < from: US Duplex Venous Lower Ext Complete, Bilateral (22 @ 18:46) >  IMPRESSION:  No evidence of deep venous thrombosis in either lower extremity.      < end of copied text >

## 2022-07-30 NOTE — PROGRESS NOTE ADULT - SUBJECTIVE AND OBJECTIVE BOX
Patient is seen and examined at the bed side, is afebrile.      REVIEW OF SYSTEMS: All other review systems are negative      ALLERGIES: losartan (Angioedema)      Vital Signs Last 24 Hrs  T(C): 36.8 (2022 10:45), Max: 37.1 (2022 23:49)  T(F): 98.2 (2022 10:45), Max: 98.7 (2022 23:49)  HR: 75 (2022 10:45) (75 - 83)  BP: 131/77 (2022 10:45) (109/70 - 131/77)  BP(mean): --  RR: 17 (2022 10:45) (17 - 19)  SpO2: 97% (2022 10:45) (96% - 100%)    Parameters below as of 2022 10:45  Patient On (Oxygen Delivery Method): nasal cannula  O2 Flow (L/min): 2      PHYSICAL EXAM:  GENERAL: Not in distress, on oxygen via NC  CHEST/LUNG:  Not using accessory muscles   HEART: s1 and s2 present  ABDOMEN:  Nontender and  Nondistended  EXTREMITIES: B/L pedal  edema  CNS: Awake and Alert      LABS:                        12.3   7.98  )-----------( 230      ( 2022 06:55 )             39.7                           14.0   4.77  )-----------( 246      ( 2022 06:25 )             46.3       07-30    139  |  98  |  23<H>  ----------------------------<  126<H>  4.5   |  38<H>  |  0.94    Ca    8.9      2022 06:55  Phos  3.5     07-30  Mg     2.4     30    TPro  7.2  /  Alb  3.1<L>  /  TBili  0.4  /  DBili  x   /  AST  17  /  ALT  31  /  AlkPhos  129<H>  07    07    135  |  99  |  16  ----------------------------<  190<H>  5.0   |  35<H>  |  0.98    Ca    8.9      2022 06:25  Phos  5.2     -  Mg     2.4         TPro  7.3  /  Alb  3.3<L>  /  TBili  0.6  /  DBili  x   /  AST  25  /  ALT  31  /  AlkPhos  142<H>          CAPILLARY BLOOD GLUCOSE  POCT Blood Glucose.: 192 mg/dL (2022 11:37)  POCT Blood Glucose.: 189 mg/dL (2022 07:37)  POCT Blood Glucose.: 205 mg/dL (2022 00:05)        Urinalysis Basic - ( 2022 03:10 )  Color: Yellow / Appearance: Slightly Turbid / S.010 / pH: x  Gluc: x / Ketone: Negative  / Bili: Negative / Urobili: Negative   Blood: x / Protein: 15 / Nitrite: Negative   Leuk Esterase: Small / RBC: 2-5 /HPF / WBC 3-5 /HPF   Sq Epi: x / Non Sq Epi: Few /HPF / Bacteria: Moderate /HPF        MEDICATIONS  (STANDING):    ALBUTerol    90 MICROgram(s) HFA Inhaler 1 Puff(s) Inhalation two times a day  apixaban 5 milliGRAM(s) Oral two times a day  artificial  tears Solution 1 Drop(s) Both EYES two times a day  aspirin  chewable 81 milliGRAM(s) Oral daily  azithromycin  IVPB      azithromycin  IVPB 500 milliGRAM(s) IV Intermittent every 24 hours  carvedilol 6.25 milliGRAM(s) Oral every 12 hours  cefTRIAXone   IVPB      cefTRIAXone   IVPB 1000 milliGRAM(s) IV Intermittent every 24 hours  ferrous    sulfate 325 milliGRAM(s) Oral daily  furosemide   Injectable 40 milliGRAM(s) IV Push two times a day  insulin lispro (ADMELOG) corrective regimen sliding scale   SubCutaneous Before meals and at bedtime  methylPREDNISolone sodium succinate Injectable 40 milliGRAM(s) IV Push daily  montelukast 10 milliGRAM(s) Oral daily  pantoprazole    Tablet 40 milliGRAM(s) Oral before breakfast  simvastatin 20 milliGRAM(s) Oral at bedtime      RADIOLOGY & ADDITIONAL TESTS:    22: CT Angio Chest PE Protocol w/ IV Cont (22 @ 22:52) Limited evaluation for segmental and subsegmental pulmonary embolism. No   main, left right, or lobar pulmonary embolism.      22: Xray Chest 1 View- PORTABLE-Urgent (22 @ 15:55) >  1. Ill-defined airspace opacities bilaterally which may be infectious or related to CHF and or not present previously.  2. Cardiomegaly with pulmonary venous engorgement.  3. Elevated right hemidiaphragm, unchanged.      MICROBIOLOGY DATA:    Respiratory Viral Panel with COVID-19 by PATRICK (22 @ 15:30)   Rapid RVP Result: NotDetec   SARS-CoV-2: NotDetec:             Patient is seen and examined at the bed side, is afebrile. the MRSA PCR and Legionella urine AG pending collection.      REVIEW OF SYSTEMS: All other review systems are negative      ALLERGIES: losartan (Angioedema)      Vital Signs Last 24 Hrs  T(C): 36.8 (2022 10:45), Max: 37.1 (2022 23:49)  T(F): 98.2 (2022 10:45), Max: 98.7 (2022 23:49)  HR: 75 (2022 10:45) (75 - 83)  BP: 131/77 (2022 10:45) (109/70 - 131/77)  BP(mean): --  RR: 17 (2022 10:45) (17 - 19)  SpO2: 97% (2022 10:45) (96% - 100%)    Parameters below as of 2022 10:45  Patient On (Oxygen Delivery Method): nasal cannula  O2 Flow (L/min): 2      PHYSICAL EXAM:  GENERAL: Not in distress, on oxygen via NC  CHEST/LUNG:  Not using accessory muscles   HEART: s1 and s2 present  ABDOMEN:  Nontender and  Nondistended  EXTREMITIES: B/L pedal  edema  CNS: Awake and Alert      LABS:                        12.3   7.98  )-----------( 230      ( 2022 06:55 )             39.7                           14.0   4.77  )-----------( 246      ( 2022 06:25 )             46.3       07-30    139  |  98  |  23<H>  ----------------------------<  126<H>  4.5   |  38<H>  |  0.94    Ca    8.9      2022 06:55  Phos  3.5     07-30  Mg     2.4     0730    TPro  7.2  /  Alb  3.1<L>  /  TBili  0.4  /  DBili  x   /  AST  17  /  ALT  31  /  AlkPhos  129<H>  07-30    07-29    135  |  99  |  16  ----------------------------<  190<H>  5.0   |  35<H>  |  0.98    Ca    8.9      2022 06:25  Phos  5.2       Mg     2.4         TPro  7.3  /  Alb  3.3<L>  /  TBili  0.6  /  DBili  x   /  AST  25  /  ALT  31  /  AlkPhos  142<H>          CAPILLARY BLOOD GLUCOSE  POCT Blood Glucose.: 192 mg/dL (2022 11:37)  POCT Blood Glucose.: 189 mg/dL (2022 07:37)  POCT Blood Glucose.: 205 mg/dL (2022 00:05)        Urinalysis Basic - ( 2022 03:10 )  Color: Yellow / Appearance: Slightly Turbid / S.010 / pH: x  Gluc: x / Ketone: Negative  / Bili: Negative / Urobili: Negative   Blood: x / Protein: 15 / Nitrite: Negative   Leuk Esterase: Small / RBC: 2-5 /HPF / WBC 3-5 /HPF   Sq Epi: x / Non Sq Epi: Few /HPF / Bacteria: Moderate /HPF      MEDICATIONS  (STANDING):    ALBUTerol    90 MICROgram(s) HFA Inhaler 1 Puff(s) Inhalation two times a day  apixaban 5 milliGRAM(s) Oral two times a day  artificial  tears Solution 1 Drop(s) Both EYES two times a day  aspirin  chewable 81 milliGRAM(s) Oral daily  azithromycin  IVPB      azithromycin  IVPB 500 milliGRAM(s) IV Intermittent every 24 hours  carvedilol 6.25 milliGRAM(s) Oral every 12 hours  cefTRIAXone   IVPB      cefTRIAXone   IVPB 1000 milliGRAM(s) IV Intermittent every 24 hours  ferrous    sulfate 325 milliGRAM(s) Oral daily  furosemide   Injectable 40 milliGRAM(s) IV Push two times a day  insulin lispro (ADMELOG) corrective regimen sliding scale   SubCutaneous Before meals and at bedtime  methylPREDNISolone sodium succinate Injectable 40 milliGRAM(s) IV Push daily  montelukast 10 milliGRAM(s) Oral daily  pantoprazole    Tablet 40 milliGRAM(s) Oral before breakfast  simvastatin 20 milliGRAM(s) Oral at bedtime      RADIOLOGY & ADDITIONAL TESTS:    22: CT Angio Chest PE Protocol w/ IV Cont (22 @ 22:52) Limited evaluation for segmental and subsegmental pulmonary embolism. No   main, left right, or lobar pulmonary embolism.      22: Xray Chest 1 View- PORTABLE-Urgent (22 @ 15:55) >  1. Ill-defined airspace opacities bilaterally which may be infectious or related to CHF and or not present previously.  2. Cardiomegaly with pulmonary venous engorgement.  3. Elevated right hemidiaphragm, unchanged.      MICROBIOLOGY DATA:    Respiratory Viral Panel with COVID-19 by PATRICK (22 @ 15:30)   Rapid RVP Result: NotDetec   SARS-CoV-2: NotDetec:

## 2022-07-30 NOTE — PROGRESS NOTE ADULT - PROBLEM SELECTOR PLAN 1
Pt p/w hypoxia to 77% at PMD's office, noted to have worsening lower extremity swelling, difficulty breathing  Possible due to medication non-compliance, lasix dose recently reduced outpatient   - 92% on 3L, will maintain O2 sats 88-92% given COPD hx, prevent over oxygenation  - Xray : Ill-defined airspace opacities bilaterally which may be infectious or related to CHF and or not present previously. Cardiomegaly with pulmonary venous engorgement.  - Although pt appears to have a more sedentary lifestyle, doubt PE in the setting of Eliquis use at home  - EKG 7/28 - afib  - Trop: 197.5 >178, downtrending   - BNP: 2324 (from 750 last admission in Nov)  - CTA 7/27 = small R sided pleural effusion, no evidence of PE   - s/p 2 doses of 20 IV Lasix  - c/w lasix 40 IV BID  - strict I&Os, daily weights  - lower suspicion for pneumonia - pt. also on ceftriaxone, and azithromycin, today is Day 3, 7/31--will obtain procalcitonin  - Dr. Denton  cardiology on board   - Dr. Denton ID consulted

## 2022-07-30 NOTE — PROGRESS NOTE ADULT - PROBLEM SELECTOR PLAN 4
Cardio eval  R/o ACS protocol  Tele monitoring  ASA, BB, Statin  Echo  lasix trial. Cardio follow up  R/o ACS protocol  Tele monitoring  ASA, BB, Statin  Echo  lasix trial.

## 2022-07-30 NOTE — PROGRESS NOTE ADULT - SUBJECTIVE AND OBJECTIVE BOX
Patient was seen and examined  Patient is a 89y old  Female who presents with a chief complaint of Weakness, SOB (2022 15:27)      INTERVAL HPI/OVERNIGHT EVENTS:  T(C): 37.1 (22 @ 23:49), Max: 37.5 (22 @ 16:04)  HR: 80 (22 @ 23:49) (58 - 80)  BP: 109/70 (22 @ 23:49) (108/64 - 142/83)  RR: 18 (22 @ 23:49) (18 - 19)  SpO2: 97% (22 @ 23:49) (96% - 100%)  Wt(kg): --  I&O's Summary    2022 07:01  -  2022 07:00  --------------------------------------------------------  IN: 0 mL / OUT: 150 mL / NET: -150 mL        LABS:                        14.0   4.77  )-----------( 246      ( 2022 06:25 )             46.3         135  |  99  |  16  ----------------------------<  190<H>  5.0   |  35<H>  |  0.98    Ca    8.9      2022 06:25  Phos  5.2       Mg     2.4         TPro  7.3  /  Alb  3.3<L>  /  TBili  0.6  /  DBili  x   /  AST  25  /  ALT  31  /  AlkPhos  142<H>        Urinalysis Basic - ( 2022 03:10 )    Color: Yellow / Appearance: Slightly Turbid / S.010 / pH: x  Gluc: x / Ketone: Negative  / Bili: Negative / Urobili: Negative   Blood: x / Protein: 15 / Nitrite: Negative   Leuk Esterase: Small / RBC: 2-5 /HPF / WBC 3-5 /HPF   Sq Epi: x / Non Sq Epi: Few /HPF / Bacteria: Moderate /HPF      CAPILLARY BLOOD GLUCOSE      POCT Blood Glucose.: 137 mg/dL (2022 21:15)  POCT Blood Glucose.: 173 mg/dL (2022 16:58)  POCT Blood Glucose.: 192 mg/dL (2022 11:37)  POCT Blood Glucose.: 189 mg/dL (2022 07:37)    LIPID PANEL  Cholesterol 214  LDL --  HDL 67  RATIO HDL/Total Cholesterol --  Triglyceride 83        Urinalysis Basic - ( 2022 03:10 )    Color: Yellow / Appearance: Slightly Turbid / S.010 / pH: x  Gluc: x / Ketone: Negative  / Bili: Negative / Urobili: Negative   Blood: x / Protein: 15 / Nitrite: Negative   Leuk Esterase: Small / RBC: 2-5 /HPF / WBC 3-5 /HPF   Sq Epi: x / Non Sq Epi: Few /HPF / Bacteria: Moderate /HPF        MEDICATIONS  (STANDING):  ALBUTerol    90 MICROgram(s) HFA Inhaler 1 Puff(s) Inhalation two times a day  apixaban 5 milliGRAM(s) Oral two times a day  aspirin  chewable 81 milliGRAM(s) Oral daily  azithromycin  IVPB      azithromycin  IVPB 500 milliGRAM(s) IV Intermittent every 24 hours  carvedilol 6.25 milliGRAM(s) Oral every 12 hours  cefTRIAXone   IVPB      cefTRIAXone   IVPB 1000 milliGRAM(s) IV Intermittent every 24 hours  ferrous    sulfate 325 milliGRAM(s) Oral daily  furosemide   Injectable 40 milliGRAM(s) IV Push two times a day  insulin lispro (ADMELOG) corrective regimen sliding scale   SubCutaneous Before meals and at bedtime  methylPREDNISolone sodium succinate Injectable 40 milliGRAM(s) IV Push every 12 hours  montelukast 10 milliGRAM(s) Oral daily  pantoprazole    Tablet 40 milliGRAM(s) Oral before breakfast  simvastatin 20 milliGRAM(s) Oral at bedtime    MEDICATIONS  (PRN):      RADIOLOGY & ADDITIONAL TESTS:    Imaging Personally Reviewed:  [ ] YES  [ ] NO    REVIEW OF SYSTEMS:  CONSTITUTIONAL: No fever, weight loss, or fatigue  EYES: No eye pain, visual disturbances, or discharge  ENMT:  No difficulty hearing, tinnitus, vertigo; No sinus or throat pain  NECK: No pain or stiffness  BREASTS: No pain, masses, or nipple discharge  RESPIRATORY: No cough, wheezing, chills or hemoptysis; No shortness of breath  CARDIOVASCULAR: No chest pain, palpitations, dizziness, or leg swelling  GASTROINTESTINAL: No abdominal or epigastric pain. No nausea, vomiting, or hematemesis; No diarrhea or constipation. No melena or hematochezia.  GENITOURINARY: No dysuria, frequency, hematuria, or incontinence  NEUROLOGICAL: No headaches, memory loss, loss of strength, numbness, or tremors  SKIN: No itching, burning, rashes, or lesions   LYMPH NODES: No enlarged glands  ENDOCRINE: No heat or cold intolerance; No hair loss  MUSCULOSKELETAL: No joint pain or swelling; No muscle, back, or extremity pain  PSYCHIATRIC: No depression, anxiety, mood swings, or difficulty sleeping  HEME/LYMPH: No easy bruising, or bleeding gums  ALLERY AND IMMUNOLOGIC: No hives or eczema      Consultant(s) Notes Reviewed:  [ x ] YES  [ ] NO    PHYSICAL EXAM:  GENERAL: NAD, well-groomed, well-developed  HEAD:  Atraumatic, Normocephalic  EYES: EOMI, PERRLA, conjunctiva and sclera clear  ENMT: No tonsillar erythema, exudates, or enlargement; Moist mucous membranes, Good dentition, No lesions  NECK: Supple, No JVD, Normal thyroid  NERVOUS SYSTEM:  Alert & Oriented X3, Good concentration; Motor Strength 5/5 B/L upper and lower extremities; DTRs 2+ intact and symmetric  CHEST/LUNG: Clear to percussion bilaterally; No rales, rhonchi, wheezing, or rubs  HEART: Regular rate and rhythm; No murmurs, rubs, or gallops  ABDOMEN: Soft, Nontender, Nondistended; Bowel sounds present  EXTREMITIES:  2+ Peripheral Pulses, No clubbing, cyanosis, or edema  LYMPH: No lymphadenopathy noted  SKIN: No rashes or lesions    Care Discussed with Consultants/Other Providers [ x] YES  [ ] NO

## 2022-07-30 NOTE — PROGRESS NOTE ADULT - PROBLEM SELECTOR PLAN 1
oxygen supplementation  monitor oxygen saturation   Bronchodilators PRN  check ABGs  Monitor respiratory status. oxygen supplementation  monitor oxygen saturation   Bronchodilators PRN  Monitor respiratory status.

## 2022-07-31 DIAGNOSIS — J18.9 PNEUMONIA, UNSPECIFIED ORGANISM: ICD-10-CM

## 2022-07-31 LAB
ALBUMIN SERPL ELPH-MCNC: 3.2 G/DL — LOW (ref 3.5–5)
ALP SERPL-CCNC: 124 U/L — HIGH (ref 40–120)
ALT FLD-CCNC: 29 U/L DA — SIGNIFICANT CHANGE UP (ref 10–60)
ANION GAP SERPL CALC-SCNC: 3 MMOL/L — LOW (ref 5–17)
AST SERPL-CCNC: 15 U/L — SIGNIFICANT CHANGE UP (ref 10–40)
BILIRUB SERPL-MCNC: 0.4 MG/DL — SIGNIFICANT CHANGE UP (ref 0.2–1.2)
BUN SERPL-MCNC: 26 MG/DL — HIGH (ref 7–18)
CALCIUM SERPL-MCNC: 8.7 MG/DL — SIGNIFICANT CHANGE UP (ref 8.4–10.5)
CHLORIDE SERPL-SCNC: 97 MMOL/L — SIGNIFICANT CHANGE UP (ref 96–108)
CO2 SERPL-SCNC: 41 MMOL/L — HIGH (ref 22–31)
CREAT SERPL-MCNC: 0.97 MG/DL — SIGNIFICANT CHANGE UP (ref 0.5–1.3)
EGFR: 56 ML/MIN/1.73M2 — LOW
GLUCOSE BLDC GLUCOMTR-MCNC: 120 MG/DL — HIGH (ref 70–99)
GLUCOSE BLDC GLUCOMTR-MCNC: 164 MG/DL — HIGH (ref 70–99)
GLUCOSE BLDC GLUCOMTR-MCNC: 184 MG/DL — HIGH (ref 70–99)
GLUCOSE BLDC GLUCOMTR-MCNC: 185 MG/DL — HIGH (ref 70–99)
GLUCOSE SERPL-MCNC: 114 MG/DL — HIGH (ref 70–99)
HCT VFR BLD CALC: 42.9 % — SIGNIFICANT CHANGE UP (ref 34.5–45)
HGB BLD-MCNC: 13.2 G/DL — SIGNIFICANT CHANGE UP (ref 11.5–15.5)
LEGIONELLA AG UR QL: NEGATIVE — SIGNIFICANT CHANGE UP
MAGNESIUM SERPL-MCNC: 2.4 MG/DL — SIGNIFICANT CHANGE UP (ref 1.6–2.6)
MCHC RBC-ENTMCNC: 30.8 GM/DL — LOW (ref 32–36)
MCHC RBC-ENTMCNC: 30.8 PG — SIGNIFICANT CHANGE UP (ref 27–34)
MCV RBC AUTO: 100 FL — SIGNIFICANT CHANGE UP (ref 80–100)
MRSA PCR RESULT.: SIGNIFICANT CHANGE UP
NRBC # BLD: 0 /100 WBCS — SIGNIFICANT CHANGE UP (ref 0–0)
PHOSPHATE SERPL-MCNC: 3.6 MG/DL — SIGNIFICANT CHANGE UP (ref 2.5–4.5)
PLATELET # BLD AUTO: 236 K/UL — SIGNIFICANT CHANGE UP (ref 150–400)
POTASSIUM SERPL-MCNC: 4.2 MMOL/L — SIGNIFICANT CHANGE UP (ref 3.5–5.3)
POTASSIUM SERPL-SCNC: 4.2 MMOL/L — SIGNIFICANT CHANGE UP (ref 3.5–5.3)
PROT SERPL-MCNC: 7.3 G/DL — SIGNIFICANT CHANGE UP (ref 6–8.3)
RBC # BLD: 4.29 M/UL — SIGNIFICANT CHANGE UP (ref 3.8–5.2)
RBC # FLD: 13.5 % — SIGNIFICANT CHANGE UP (ref 10.3–14.5)
S AUREUS DNA NOSE QL NAA+PROBE: SIGNIFICANT CHANGE UP
SODIUM SERPL-SCNC: 141 MMOL/L — SIGNIFICANT CHANGE UP (ref 135–145)
WBC # BLD: 8.34 K/UL — SIGNIFICANT CHANGE UP (ref 3.8–10.5)
WBC # FLD AUTO: 8.34 K/UL — SIGNIFICANT CHANGE UP (ref 3.8–10.5)

## 2022-07-31 RX ORDER — FUROSEMIDE 40 MG
40 TABLET ORAL DAILY
Refills: 0 | Status: DISCONTINUED | OUTPATIENT
Start: 2022-07-31 | End: 2022-08-01

## 2022-07-31 RX ORDER — LANOLIN ALCOHOL/MO/W.PET/CERES
5 CREAM (GRAM) TOPICAL AT BEDTIME
Refills: 0 | Status: DISCONTINUED | OUTPATIENT
Start: 2022-07-31 | End: 2022-08-04

## 2022-07-31 RX ORDER — POLYETHYLENE GLYCOL 3350 17 G/17G
17 POWDER, FOR SOLUTION ORAL ONCE
Refills: 0 | Status: COMPLETED | OUTPATIENT
Start: 2022-07-31 | End: 2022-07-31

## 2022-07-31 RX ADMIN — Medication 5 MILLIGRAM(S): at 21:53

## 2022-07-31 RX ADMIN — Medication 81 MILLIGRAM(S): at 11:47

## 2022-07-31 RX ADMIN — Medication 1: at 11:49

## 2022-07-31 RX ADMIN — Medication 1: at 17:27

## 2022-07-31 RX ADMIN — APIXABAN 5 MILLIGRAM(S): 2.5 TABLET, FILM COATED ORAL at 17:24

## 2022-07-31 RX ADMIN — Medication 40 MILLIGRAM(S): at 06:22

## 2022-07-31 RX ADMIN — SIMVASTATIN 20 MILLIGRAM(S): 20 TABLET, FILM COATED ORAL at 21:53

## 2022-07-31 RX ADMIN — Medication 1: at 21:54

## 2022-07-31 RX ADMIN — Medication 325 MILLIGRAM(S): at 11:48

## 2022-07-31 RX ADMIN — Medication 1 DROP(S): at 17:25

## 2022-07-31 RX ADMIN — POLYETHYLENE GLYCOL 3350 17 GRAM(S): 17 POWDER, FOR SOLUTION ORAL at 19:29

## 2022-07-31 RX ADMIN — PANTOPRAZOLE SODIUM 40 MILLIGRAM(S): 20 TABLET, DELAYED RELEASE ORAL at 06:22

## 2022-07-31 RX ADMIN — AZITHROMYCIN 255 MILLIGRAM(S): 500 TABLET, FILM COATED ORAL at 21:53

## 2022-07-31 RX ADMIN — Medication 5 MILLIGRAM(S): at 02:05

## 2022-07-31 RX ADMIN — MONTELUKAST 10 MILLIGRAM(S): 4 TABLET, CHEWABLE ORAL at 11:47

## 2022-07-31 RX ADMIN — CARVEDILOL PHOSPHATE 6.25 MILLIGRAM(S): 80 CAPSULE, EXTENDED RELEASE ORAL at 17:24

## 2022-07-31 RX ADMIN — APIXABAN 5 MILLIGRAM(S): 2.5 TABLET, FILM COATED ORAL at 06:22

## 2022-07-31 RX ADMIN — Medication 40 MILLIGRAM(S): at 06:23

## 2022-07-31 RX ADMIN — Medication 40 MILLIGRAM(S): at 11:48

## 2022-07-31 RX ADMIN — CARVEDILOL PHOSPHATE 6.25 MILLIGRAM(S): 80 CAPSULE, EXTENDED RELEASE ORAL at 06:22

## 2022-07-31 RX ADMIN — CEFTRIAXONE 100 MILLIGRAM(S): 500 INJECTION, POWDER, FOR SOLUTION INTRAMUSCULAR; INTRAVENOUS at 21:53

## 2022-07-31 RX ADMIN — ALBUTEROL 1 PUFF(S): 90 AEROSOL, METERED ORAL at 11:48

## 2022-07-31 RX ADMIN — Medication 1 DROP(S): at 06:23

## 2022-07-31 RX ADMIN — ALBUTEROL 1 PUFF(S): 90 AEROSOL, METERED ORAL at 21:54

## 2022-07-31 NOTE — PROGRESS NOTE ADULT - ASSESSMENT
· Assessment	  Pt is a 89 yo Female, from home, ambulates using walker/wheelchair, lives with grand-daughter, with PMHx of COPD (not on home oxygen), Afib on Eliquis, HFpEF (Echo Nov 2021 EF 55-60%), prior DVT, DM, presenting with generalized weakness for 2 days, with worsening leg swelling and difficulty breathing, admitted for Acute hypoxic respiratory failure likely in the setting of CHF exacerbation     Problem/Plan - 1:  ·  Problem: Acute respiratory failure with hypoxia.   ·  Plan: Pt p/w hypoxia to 77% at PMD's office, noted to have worsening lower extremity swelling, difficulty breathing  Possible due to medication non-compliance, lasix dose recently reduced outpatient   - BNP: 2324 (from 750 last admission in Nov)  - continue  on lasix 40 IV BID   - strict I&Os, daily weights  - f/u echo       Problem/Plan - 2:  ·  Problem: Acute on chronic diastolic congestive heart failure.   ·  Plan: As above.     Problem/Plan - 3:  ·  Problem: Atrial fibrillation.   ·  Plan: PT has hx of Afib, rate controlled, on Eliquis and Coreg   C/w home medications.

## 2022-07-31 NOTE — PROGRESS NOTE ADULT - PROBLEM SELECTOR PLAN 1
oxygen supplementation  monitor oxygen saturation   Bronchodilators PRN  Monitor respiratory status.

## 2022-07-31 NOTE — PROGRESS NOTE ADULT - PROBLEM SELECTOR PLAN 1
Pt p/w hypoxia to 77% at PMD's office, noted to have worsening lower extremity swelling, difficulty breathing  Possible due to medication non-compliance, lasix dose recently reduced outpatient   - 92% on 3L, will maintain O2 sats 88-92% given COPD hx, prevent over oxygenation  - Xray : Ill-defined airspace opacities bilaterally which may be infectious or related to CHF and or not present previously. Cardiomegaly with pulmonary venous engorgement.  - Although pt appears to have a more sedentary lifestyle, doubt PE in the setting of Eliquis use at home  - EKG 7/28 - afib  - Trop: 197.5 >178, downtrending   - BNP: 2324 (from 750 last admission in Nov)  - CTA 7/27 = small R sided pleural effusion, no evidence of PE   - s/p 2 doses of 20 IV Lasix  - c/w lasix 40 IV BID  - strict I&Os, daily weights  - lower suspicion for pneumonia - pt. also on ceftriaxone, and azithromycin, today is Day 3, 7/31--will obtain procalcitonin  - Dr. Denton  cardiology on board   - Dr. Denton ID consulted  IV ABX

## 2022-07-31 NOTE — PROGRESS NOTE ADULT - PROBLEM SELECTOR PLAN 1
Pt p/w hypoxia to 77% at PMD's office, noted to have worsening lower extremity swelling, difficulty breathing  Possible due to medication non-compliance, lasix dose recently reduced outpatient   - 92% on 3L, will maintain O2 sats 88-92% given COPD hx, prevent over oxygenation  - Xray : Ill-defined airspace opacities bilaterally which may be infectious or related to CHF and or not present previously. Cardiomegaly with pulmonary venous engorgement.  - Although pt appears to have a more sedentary lifestyle, doubt PE in the setting of Eliquis use at home  - EKG 7/28 - afib  - Trop: 197.5 >178, downtrending   - BNP: 2324 (from 750 last admission in Nov)  - CTA 7/27 = small R sided pleural effusion, no evidence of PE   - s/p 2 doses of 20 IV Lasix  - lasix dose decreased from 40 IV BID to 40 IV QD  - strict I&Os, daily weights  - lower suspicion for pneumonia - pt. also on ceftriaxone, and azithromycin, today is Day 4, 7/31  - pro calcitonin 0.05  - Dr. Denton  cardiology on board   - Dr. Denton ID consulted

## 2022-07-31 NOTE — PROGRESS NOTE ADULT - SUBJECTIVE AND OBJECTIVE BOX
Patient is seen and examined at the bed side, is afebrile. The MRSA PCR and Legionella urine AG are negative.       REVIEW OF SYSTEMS: All other review systems are negative       ALLERGIES: losartan (Angioedema)      Vital Signs Last 24 Hrs  T(C): 36.8 (2022 15:30), Max: 37 (2022 11:08)  T(F): 98.2 (2022 15:30), Max: 98.6 (2022 11:08)  HR: 84 (2022 15:30) (60 - 86)  BP: 128/85 (2022 15:30) (116/80 - 144/93)  BP(mean): --  RR: 19 (2022 15:30) (17 - 19)  SpO2: 96% (2022 15:30) (94% - 98%)    Parameters below as of 2022 15:30  Patient On (Oxygen Delivery Method): nasal cannula  O2 Flow (L/min): 2      PHYSICAL EXAM:  GENERAL: Not in distress, on oxygen via NC  CHEST/LUNG:  Not using accessory muscles   HEART: s1 and s2 present  ABDOMEN:  Nontender and  Nondistended  EXTREMITIES: B/L pedal  edema  CNS: Awake and Alert      LABS:                        13.2   8.34  )-----------( 236      ( 2022 07:03 )             42.9                           12.3   7.98  )-----------( 230      ( 2022 06:55 )             39.7           141  |  97  |  26<H>  ----------------------------<  114<H>  4.2   |  41<H>  |  0.97    Ca    8.7      2022 07:03  Phos  3.6       Mg     2.4         TPro  7.3  /  Alb  3.2<L>  /  TBili  0.4  /  DBili  x   /  AST  15  /  ALT  29  /  AlkPhos  124<H>  30    139  |  98  |  23<H>  ----------------------------<  126<H>  4.5   |  38<H>  |  0.94    Ca    8.9      2022 06:55  Phos  3.5     07-30  Mg     2.4     07-30    TPro  7.2  /  Alb  3.1<L>  /  TBili  0.4  /  DBili  x   /  AST  17  /  ALT  31  /  AlkPhos  129<H>  07-30        CAPILLARY BLOOD GLUCOSE  POCT Blood Glucose.: 192 mg/dL (2022 11:37)  POCT Blood Glucose.: 189 mg/dL (2022 07:37)  POCT Blood Glucose.: 205 mg/dL (2022 00:05)        Urinalysis Basic - ( 2022 03:10 )  Color: Yellow / Appearance: Slightly Turbid / S.010 / pH: x  Gluc: x / Ketone: Negative  / Bili: Negative / Urobili: Negative   Blood: x / Protein: 15 / Nitrite: Negative   Leuk Esterase: Small / RBC: 2-5 /HPF / WBC 3-5 /HPF   Sq Epi: x / Non Sq Epi: Few /HPF / Bacteria: Moderate /HPF      MEDICATIONS  (STANDING):    ALBUTerol    90 MICROgram(s) HFA Inhaler 1 Puff(s) Inhalation two times a day  apixaban 5 milliGRAM(s) Oral two times a day  artificial  tears Solution 1 Drop(s) Both EYES two times a day  aspirin  chewable 81 milliGRAM(s) Oral daily  azithromycin  IVPB      azithromycin  IVPB 500 milliGRAM(s) IV Intermittent every 24 hours  carvedilol 6.25 milliGRAM(s) Oral every 12 hours  cefTRIAXone   IVPB      cefTRIAXone   IVPB 1000 milliGRAM(s) IV Intermittent every 24 hours  ferrous    sulfate 325 milliGRAM(s) Oral daily  furosemide   Injectable 40 milliGRAM(s) IV Push daily  insulin lispro (ADMELOG) corrective regimen sliding scale   SubCutaneous Before meals and at bedtime  melatonin 5 milliGRAM(s) Oral at bedtime  methylPREDNISolone sodium succinate Injectable 40 milliGRAM(s) IV Push daily  montelukast 10 milliGRAM(s) Oral daily  pantoprazole    Tablet 40 milliGRAM(s) Oral before breakfast  simvastatin 20 milliGRAM(s) Oral at bedtime      RADIOLOGY & ADDITIONAL TESTS:      22: CT Angio Chest PE Protocol w/ IV Cont (22 @ 22:52) Limited evaluation for segmental and subsegmental pulmonary embolism. No   main, left right, or lobar pulmonary embolism.      22: Xray Chest 1 View- PORTABLE-Urgent (22 @ 15:55) >  1. Ill-defined airspace opacities bilaterally which may be infectious or related to CHF and or not present previously.  2. Cardiomegaly with pulmonary venous engorgement.  3. Elevated right hemidiaphragm, unchanged.      MICROBIOLOGY DATA:    Legionella pneumophila Antigen, Urine (22 @ 21:40)   Legionella Antigen, Urine: Negative    MRSA/MSSA PCR (22 @ 17:50)   MRSA PCR Result.: NotDetec:     Respiratory Viral Panel with COVID-19 by PATRICK (22 @ 15:30)   Rapid RVP Result: NotDetec   SARS-CoV-2: NotDetec:

## 2022-07-31 NOTE — PROGRESS NOTE ADULT - SUBJECTIVE AND OBJECTIVE BOX
Patient is a 89y old  Female who presents with a chief complaint of Weakness, SOB (31 Jul 2022 00:37)    The patient is alert, awake and laying comfortably in bed in no acute distress. Clinically improving. She is on oxygen supplement via NC.       INTERVAL HPI/OVERNIGHT EVENTS:      VITAL SIGNS:  T(F): 97.4 (07-31-22 @ 07:37)  HR: 60 (07-31-22 @ 07:37)  BP: 117/67 (07-31-22 @ 07:37)  RR: 19 (07-31-22 @ 07:37)  SpO2: 94% (07-31-22 @ 07:37)  Wt(kg): --  I&O's Detail    30 Jul 2022 07:01  -  31 Jul 2022 07:00  --------------------------------------------------------  IN:  Total IN: 0 mL    OUT:    Voided (mL): 2700 mL  Total OUT: 2700 mL    Total NET: -2700 mL              REVIEW OF SYSTEMS:    CONSTITUTIONAL:  No fevers, chills, sweats    HEENT:  Eyes:  No diplopia or blurred vision. ENT:  No earache, sore throat or runny nose.    CARDIOVASCULAR:  No pressure, squeezing, tightness, or heaviness about the chest; no palpitations.    RESPIRATORY:  Per HPI    GASTROINTESTINAL:  No abdominal pain, nausea, vomiting or diarrhea.    GENITOURINARY:  No dysuria, frequency or urgency.    NEUROLOGIC:  No paresthesias, fasciculations, seizures or weakness.    PSYCHIATRIC:  No disorder of thought or mood.      PHYSICAL EXAM:    Constitutional: Well developed and nourished  Eyes:Perrla  ENMT: normal  Neck:supple  Respiratory: good air entry  Cardiovascular: S1 S2 regular  Gastrointestinal: Soft, Non tender  Extremities: No edema  Vascular:normal  Neurological:Awake, alert,Ox3  Musculoskeletal:Normal      MEDICATIONS  (STANDING):  ALBUTerol    90 MICROgram(s) HFA Inhaler 1 Puff(s) Inhalation two times a day  apixaban 5 milliGRAM(s) Oral two times a day  artificial  tears Solution 1 Drop(s) Both EYES two times a day  aspirin  chewable 81 milliGRAM(s) Oral daily  azithromycin  IVPB      azithromycin  IVPB 500 milliGRAM(s) IV Intermittent every 24 hours  carvedilol 6.25 milliGRAM(s) Oral every 12 hours  cefTRIAXone   IVPB      cefTRIAXone   IVPB 1000 milliGRAM(s) IV Intermittent every 24 hours  ferrous    sulfate 325 milliGRAM(s) Oral daily  furosemide   Injectable 40 milliGRAM(s) IV Push two times a day  insulin lispro (ADMELOG) corrective regimen sliding scale   SubCutaneous Before meals and at bedtime  melatonin 5 milliGRAM(s) Oral at bedtime  methylPREDNISolone sodium succinate Injectable 40 milliGRAM(s) IV Push daily  montelukast 10 milliGRAM(s) Oral daily  pantoprazole    Tablet 40 milliGRAM(s) Oral before breakfast  simvastatin 20 milliGRAM(s) Oral at bedtime    MEDICATIONS  (PRN):      Allergies    losartan (Angioedema)    Intolerances    Chicken (Stomach Upset)      LABS:                        13.2   8.34  )-----------( 236      ( 31 Jul 2022 07:03 )             42.9     07-31    141  |  97  |  26<H>  ----------------------------<  114<H>  4.2   |  41<H>  |  0.97    Ca    8.7      31 Jul 2022 07:03  Phos  3.6     07-31  Mg     2.4     07-31    TPro  7.3  /  Alb  3.2<L>  /  TBili  0.4  /  DBili  x   /  AST  15  /  ALT  29  /  AlkPhos  124<H>  07-31      Respiratory Viral Panel with COVID-19 by PATRICK (07.28.22 @ 15:30)   Rapid RVP Result: [facility]         CAPILLARY BLOOD GLUCOSE      POCT Blood Glucose.: 120 mg/dL (31 Jul 2022 07:49)  POCT Blood Glucose.: 180 mg/dL (30 Jul 2022 21:11)  POCT Blood Glucose.: 173 mg/dL (30 Jul 2022 16:38)  POCT Blood Glucose.: 198 mg/dL (30 Jul 2022 11:40)    pro-bnp 2324 07-28 @ 15:30     d-dimer --  07-28 @ 15:30      RADIOLOGY & ADDITIONAL TESTS:    < from: US Duplex Venous Lower Ext Complete, Bilateral (07.29.22 @ 18:46) >  IMPRESSION:  No evidence of deep venous thrombosis in either lower extremity.      --- End of Report ---    < end of copied text >      CXR:    < from: Xray Chest 1 View- PORTABLE-Urgent (07.28.22 @ 15:55) >  IMPRESSION:  1. Ill-defined airspace opacities bilaterally which may be infectious or   related to CHF and or not present previously.  2. Cardiomegaly with pulmonary venous engorgement.  3. Elevated right hemidiaphragm, unchanged.    --- End of Report ---    < end of copied text >      Ct scan chest:    < from: CT Angio Chest PE Protocol w/ IV Cont (07.28.22 @ 22:52) >  IMPRESSION:  Limited evaluation for segmental and subsegmental pulmonary embolism. No   main, left right, or lobar pulmonary embolism.    --- End of Report ---    < end of copied text >      ekg;    < from: 12 Lead ECG (07.28.22 @ 14:18) >  Diagnosis Line Atrial fibrillation  Low voltage QRS  Septal infarct , age undetermined  Abnormal ECG    < end of copied text >      echo:    < from: Transthoracic Echocardiogram (07.29.22 @ 08:30) >  CONCLUSIONS:  1. Normal mitral valve.  2. Calcified trileaflet aortic valve with normal opening.  3. Aortic Root: 4.0 cm.  4. Normal left atrium.  LA volume index = 32 cc/m2.  5. Mild concentric left ventricular hypertrophy.  6. Normal Left Ventricular Systolic Function,  (EF = 55 to  60%)  7. Grade I diastolic dysfunction (Impaired relaxation,  mild).  8. Normal right atrium.  9. Normal right ventricular size and function.  10. RV systolic pressure is mildly increased at  39 mm Hg.  11. Normal tricuspid valve.  12. There is trace pulmonic regurgitation.  13. Normal pericardium with no pericardial effusion.    < end of copied text >   Patient is a 89y old  Female who presents with a chief complaint of Weakness, SOB (31 Jul 2022 00:37)    The patient is alert, awake, out of bed in no acute distress. Clinically improving. She is on oxygen supplement via NC.       INTERVAL HPI/OVERNIGHT EVENTS:      VITAL SIGNS:  T(F): 97.4 (07-31-22 @ 07:37)  HR: 60 (07-31-22 @ 07:37)  BP: 117/67 (07-31-22 @ 07:37)  RR: 19 (07-31-22 @ 07:37)  SpO2: 94% (07-31-22 @ 07:37)  Wt(kg): --  I&O's Detail    30 Jul 2022 07:01  -  31 Jul 2022 07:00  --------------------------------------------------------  IN:  Total IN: 0 mL    OUT:    Voided (mL): 2700 mL  Total OUT: 2700 mL    Total NET: -2700 mL              REVIEW OF SYSTEMS:    CONSTITUTIONAL:  No fevers, chills, sweats    HEENT:  Eyes:  No diplopia or blurred vision. ENT:  No earache, sore throat or runny nose.    CARDIOVASCULAR:  No pressure, squeezing, tightness, or heaviness about the chest; no palpitations.    RESPIRATORY:  Per HPI    GASTROINTESTINAL:  No abdominal pain, nausea, vomiting or diarrhea.    GENITOURINARY:  No dysuria, frequency or urgency.    NEUROLOGIC:  No paresthesias, fasciculations, seizures or weakness.    PSYCHIATRIC:  No disorder of thought or mood.      PHYSICAL EXAM:    Constitutional: Well developed and nourished  Eyes:Perrla  ENMT: normal  Neck:supple  Respiratory: good air entry  Cardiovascular: S1 S2 regular  Gastrointestinal: Soft, Non tender  Extremities: No edema  Vascular:normal  Neurological:Awake, alert,Ox3  Musculoskeletal:Normal      MEDICATIONS  (STANDING):  ALBUTerol    90 MICROgram(s) HFA Inhaler 1 Puff(s) Inhalation two times a day  apixaban 5 milliGRAM(s) Oral two times a day  artificial  tears Solution 1 Drop(s) Both EYES two times a day  aspirin  chewable 81 milliGRAM(s) Oral daily  azithromycin  IVPB      azithromycin  IVPB 500 milliGRAM(s) IV Intermittent every 24 hours  carvedilol 6.25 milliGRAM(s) Oral every 12 hours  cefTRIAXone   IVPB      cefTRIAXone   IVPB 1000 milliGRAM(s) IV Intermittent every 24 hours  ferrous    sulfate 325 milliGRAM(s) Oral daily  furosemide   Injectable 40 milliGRAM(s) IV Push two times a day  insulin lispro (ADMELOG) corrective regimen sliding scale   SubCutaneous Before meals and at bedtime  melatonin 5 milliGRAM(s) Oral at bedtime  methylPREDNISolone sodium succinate Injectable 40 milliGRAM(s) IV Push daily  montelukast 10 milliGRAM(s) Oral daily  pantoprazole    Tablet 40 milliGRAM(s) Oral before breakfast  simvastatin 20 milliGRAM(s) Oral at bedtime    MEDICATIONS  (PRN):      Allergies    losartan (Angioedema)    Intolerances    Chicken (Stomach Upset)      LABS:                        13.2   8.34  )-----------( 236      ( 31 Jul 2022 07:03 )             42.9     07-31    141  |  97  |  26<H>  ----------------------------<  114<H>  4.2   |  41<H>  |  0.97    Ca    8.7      31 Jul 2022 07:03  Phos  3.6     07-31  Mg     2.4     07-31    TPro  7.3  /  Alb  3.2<L>  /  TBili  0.4  /  DBili  x   /  AST  15  /  ALT  29  /  AlkPhos  124<H>  07-31      Respiratory Viral Panel with COVID-19 by PATRICK (07.28.22 @ 15:30)   Rapid RVP Result: [facility]         CAPILLARY BLOOD GLUCOSE      POCT Blood Glucose.: 120 mg/dL (31 Jul 2022 07:49)  POCT Blood Glucose.: 180 mg/dL (30 Jul 2022 21:11)  POCT Blood Glucose.: 173 mg/dL (30 Jul 2022 16:38)  POCT Blood Glucose.: 198 mg/dL (30 Jul 2022 11:40)    pro-bnp 2324 07-28 @ 15:30     d-dimer --  07-28 @ 15:30      RADIOLOGY & ADDITIONAL TESTS:    < from: US Duplex Venous Lower Ext Complete, Bilateral (07.29.22 @ 18:46) >  IMPRESSION:  No evidence of deep venous thrombosis in either lower extremity.      --- End of Report ---    < end of copied text >      CXR:    < from: Xray Chest 1 View- PORTABLE-Urgent (07.28.22 @ 15:55) >  IMPRESSION:  1. Ill-defined airspace opacities bilaterally which may be infectious or   related to CHF and or not present previously.  2. Cardiomegaly with pulmonary venous engorgement.  3. Elevated right hemidiaphragm, unchanged.    --- End of Report ---    < end of copied text >      Ct scan chest:    < from: CT Angio Chest PE Protocol w/ IV Cont (07.28.22 @ 22:52) >  IMPRESSION:  Limited evaluation for segmental and subsegmental pulmonary embolism. No   main, left right, or lobar pulmonary embolism.    --- End of Report ---    < end of copied text >      ekg;    < from: 12 Lead ECG (07.28.22 @ 14:18) >  Diagnosis Line Atrial fibrillation  Low voltage QRS  Septal infarct , age undetermined  Abnormal ECG    < end of copied text >      echo:    < from: Transthoracic Echocardiogram (07.29.22 @ 08:30) >  CONCLUSIONS:  1. Normal mitral valve.  2. Calcified trileaflet aortic valve with normal opening.  3. Aortic Root: 4.0 cm.  4. Normal left atrium.  LA volume index = 32 cc/m2.  5. Mild concentric left ventricular hypertrophy.  6. Normal Left Ventricular Systolic Function,  (EF = 55 to  60%)  7. Grade I diastolic dysfunction (Impaired relaxation,  mild).  8. Normal right atrium.  9. Normal right ventricular size and function.  10. RV systolic pressure is mildly increased at  39 mm Hg.  11. Normal tricuspid valve.  12. There is trace pulmonic regurgitation.  13. Normal pericardium with no pericardial effusion.    < end of copied text >

## 2022-07-31 NOTE — PROGRESS NOTE ADULT - SUBJECTIVE AND OBJECTIVE BOX
PATIENT SEEN AND EXAMINED ON :- 7/31/22  DATE OF SERVICE:  7/31/22           Interim events noted,Labs ,Radiological studies and Cardiology tests reviewed .

## 2022-07-31 NOTE — CHART NOTE - NSCHARTNOTEFT_GEN_A_CORE
pt was seen and examined  h and p to fu
Notified by RN that pt was not falling asleep and needed something to help her fall asleep. Pt was given 5 mg of melatonin.

## 2022-07-31 NOTE — PROGRESS NOTE ADULT - SUBJECTIVE AND OBJECTIVE BOX
Patient was seen and examined  Patient is a 89y old  Female who presents with a chief complaint of Weakness, SOB (2022 16:48)      INTERVAL HPI/OVERNIGHT EVENTS:  T(C): 36.8 (22 @ 23:30), Max: 36.9 (22 @ 19:04)  HR: 85 (22 @ 23:30) (62 - 85)  BP: 137/87 (22 @ 23:30) (116/80 - 146/79)  RR: 18 (22 @ 23:30) (17 - 19)  SpO2: 95% (22 @ 23:30) (95% - 98%)  Wt(kg): --  I&O's Summary    2022 07:01  -  2022 00:37  --------------------------------------------------------  IN: 0 mL / OUT: 2400 mL / NET: -2400 mL        LABS:                        12.3   7.98  )-----------( 230      ( 2022 06:55 )             39.7         139  |  98  |  23<H>  ----------------------------<  126<H>  4.5   |  38<H>  |  0.94    Ca    8.9      2022 06:55  Phos  3.5       Mg     2.4         TPro  7.2  /  Alb  3.1<L>  /  TBili  0.4  /  DBili  x   /  AST  17  /  ALT  31  /  AlkPhos  129<H>        Urinalysis Basic - ( 2022 03:10 )    Color: Yellow / Appearance: Slightly Turbid / S.010 / pH: x  Gluc: x / Ketone: Negative  / Bili: Negative / Urobili: Negative   Blood: x / Protein: 15 / Nitrite: Negative   Leuk Esterase: Small / RBC: 2-5 /HPF / WBC 3-5 /HPF   Sq Epi: x / Non Sq Epi: Few /HPF / Bacteria: Moderate /HPF      CAPILLARY BLOOD GLUCOSE      POCT Blood Glucose.: 180 mg/dL (2022 21:11)  POCT Blood Glucose.: 173 mg/dL (2022 16:38)  POCT Blood Glucose.: 198 mg/dL (2022 11:40)  POCT Blood Glucose.: 132 mg/dL (2022 07:46)    LIPID PANEL  Cholesterol 214  LDL --  HDL 67  RATIO HDL/Total Cholesterol --  Triglyceride 83        Urinalysis Basic - ( 2022 03:10 )    Color: Yellow / Appearance: Slightly Turbid / S.010 / pH: x  Gluc: x / Ketone: Negative  / Bili: Negative / Urobili: Negative   Blood: x / Protein: 15 / Nitrite: Negative   Leuk Esterase: Small / RBC: 2-5 /HPF / WBC 3-5 /HPF   Sq Epi: x / Non Sq Epi: Few /HPF / Bacteria: Moderate /HPF        MEDICATIONS  (STANDING):  ALBUTerol    90 MICROgram(s) HFA Inhaler 1 Puff(s) Inhalation two times a day  apixaban 5 milliGRAM(s) Oral two times a day  artificial  tears Solution 1 Drop(s) Both EYES two times a day  aspirin  chewable 81 milliGRAM(s) Oral daily  azithromycin  IVPB      azithromycin  IVPB 500 milliGRAM(s) IV Intermittent every 24 hours  carvedilol 6.25 milliGRAM(s) Oral every 12 hours  cefTRIAXone   IVPB      cefTRIAXone   IVPB 1000 milliGRAM(s) IV Intermittent every 24 hours  ferrous    sulfate 325 milliGRAM(s) Oral daily  furosemide   Injectable 40 milliGRAM(s) IV Push two times a day  insulin lispro (ADMELOG) corrective regimen sliding scale   SubCutaneous Before meals and at bedtime  methylPREDNISolone sodium succinate Injectable 40 milliGRAM(s) IV Push daily  montelukast 10 milliGRAM(s) Oral daily  pantoprazole    Tablet 40 milliGRAM(s) Oral before breakfast  simvastatin 20 milliGRAM(s) Oral at bedtime    MEDICATIONS  (PRN):      RADIOLOGY & ADDITIONAL TESTS:    Imaging Personally Reviewed:  [ ] YES  [ ] NO    REVIEW OF SYSTEMS:  CONSTITUTIONAL: No fever, weight loss, or fatigue  EYES: No eye pain, visual disturbances, or discharge  ENMT:  No difficulty hearing, tinnitus, vertigo; No sinus or throat pain  NECK: No pain or stiffness  BREASTS: No pain, masses, or nipple discharge  RESPIRATORY: No cough, wheezing, chills or hemoptysis; No shortness of breath  CARDIOVASCULAR: No chest pain, palpitations, dizziness, or leg swelling  GASTROINTESTINAL: No abdominal or epigastric pain. No nausea, vomiting, or hematemesis; No diarrhea or constipation. No melena or hematochezia.  GENITOURINARY: No dysuria, frequency, hematuria, or incontinence  NEUROLOGICAL: No headaches, memory loss, loss of strength, numbness, or tremors  SKIN: No itching, burning, rashes, or lesions   LYMPH NODES: No enlarged glands  ENDOCRINE: No heat or cold intolerance; No hair loss  MUSCULOSKELETAL: No joint pain or swelling; No muscle, back, or extremity pain  PSYCHIATRIC: No depression, anxiety, mood swings, or difficulty sleeping  HEME/LYMPH: No easy bruising, or bleeding gums  ALLERY AND IMMUNOLOGIC: No hives or eczema      Consultant(s) Notes Reviewed:  [ x ] YES  [ ] NO    PHYSICAL EXAM:  GENERAL: NAD, well-groomed, well-developed  HEAD:  Atraumatic, Normocephalic  EYES: EOMI, PERRLA, conjunctiva and sclera clear  ENMT: No tonsillar erythema, exudates, or enlargement; Moist mucous membranes, Good dentition, No lesions  NECK: Supple, No JVD, Normal thyroid  NERVOUS SYSTEM:  Alert & Oriented X3, Good concentration; Motor Strength 5/5 B/L upper and lower extremities; DTRs 2+ intact and symmetric  CHEST/LUNG: Clear to percussion bilaterally; No rales, rhonchi, wheezing, or rubs  HEART: Regular rate and rhythm; No murmurs, rubs, or gallops  ABDOMEN: Soft, Nontender, Nondistended; Bowel sounds present  EXTREMITIES:  2+ Peripheral Pulses, No clubbing, cyanosis, or edema  LYMPH: No lymphadenopathy noted  SKIN: No rashes or lesions    Care Discussed with Consultants/Other Providers [ x] YES  [ ] NO

## 2022-07-31 NOTE — PROGRESS NOTE ADULT - ASSESSMENT
Patient is a 89y old  Female from home, ambulates using walker/wheelchair, lives with grand-daughter, with PMHx of COPD (not on home oxygen), Afib on Eliquis, HFpEF (Echo Nov 2021 EF 55-60%), prior DVT, DM, presents to the ER for evaluation of generalized weakness for 2 days. She went to see her PMD and found to be hypoxic at her PMD, Dr. Bianchi's office,  to 77%. She endorses leg swelling, but states that her leg swelling has been on and off for many years. On admission, she found to have no fever, no Leukocytosis but CXR shows  Ill-defined airspace opacities bilaterally which may be infectious or related to CHF and or not present previously. She has started on Ceftriaxone and Azithromycin, and the ID consult requested to assist with further evaluation and antibiotic management.     # Pneumonia  - on CXR- MRSA PCR and Legionella urine AG is negative  # CHF exacerbation    would recommend:    1. Diuresis as per Primary/cardiology team  2. Repeat CXR in AM  3. Continue Ceftriaxone and Azithromycin until 8/3/22, may change to oral Augmentin 875 mg q 12hours on discharge   4.. Keep Both LE elevated  5. Supplemental oxygenation and bronchodilator as needed      Attending Attestation:  Time-based billing (NON-critical care).   Spent more than 35 minutes on total encounter, more than 50 % of the visit was spent counseling and/or coordinating care by the Attending physician.  The necessity of the time spent during the encounter on this date of service is due to complexity of:    - Pneumonia  - CHF exacerbation  - Hypoxia- on supplemental oxygenation

## 2022-07-31 NOTE — PROGRESS NOTE ADULT - SUBJECTIVE AND OBJECTIVE BOX
PGY 3 Note discussed with primary attending    Patient is a 89y old  Female who presents with a chief complaint of Weakness, SOB (2022 09:41)    INTERVAL HPI/OVERNIGHT EVENTS:    Patient seen and examined at bedside, lower extremity swelling improving. Decreased wheeze from admission. Pt. is slightly confused while answering questions, but family endorses that is baseline. Pt. denies any other complaints.     MEDICATIONS  (STANDING):  ALBUTerol    90 MICROgram(s) HFA Inhaler 1 Puff(s) Inhalation two times a day  apixaban 5 milliGRAM(s) Oral two times a day  aspirin  chewable 81 milliGRAM(s) Oral daily  azithromycin  IVPB      azithromycin  IVPB 500 milliGRAM(s) IV Intermittent every 24 hours  carvedilol 6.25 milliGRAM(s) Oral every 12 hours  cefTRIAXone   IVPB      cefTRIAXone   IVPB 1000 milliGRAM(s) IV Intermittent every 24 hours  ferrous    sulfate 325 milliGRAM(s) Oral daily  furosemide   Injectable 40 milliGRAM(s) IV Push two times a day  insulin lispro (ADMELOG) corrective regimen sliding scale   SubCutaneous Before meals and at bedtime  methylPREDNISolone sodium succinate Injectable 40 milliGRAM(s) IV Push daily  montelukast 10 milliGRAM(s) Oral daily  pantoprazole    Tablet 40 milliGRAM(s) Oral before breakfast  simvastatin 20 milliGRAM(s) Oral at bedtime    MEDICATIONS  (PRN):      __________________________________________________  REVIEW OF SYSTEMS:    CONSTITUTIONAL: No weakness, fevers or chills  EYES/ENT: No visual changes;  No vertigo or throat pain   NECK: No pain or stiffness  RESPIRATORY: Mild wheezing. No cough, hemoptysis; No shortness of breath  CARDIOVASCULAR: No chest pain or palpitations  GASTROINTESTINAL: No abdominal or epigastric pain. No nausea, vomiting, or hematemesis; No diarrhea or constipation. No melena or hematochezia.  GENITOURINARY: No dysuria, frequency or hematuria  NEUROLOGICAL: No numbness or weakness  SKIN: No itching, burning, rashes, or lesions   All other review of systems is negative unless indicated above.    Vital Signs Last 24 Hrs  T(C): 36.5 (2022 07:32), Max: 37.5 (2022 16:04)  T(F): 97.7 (2022 07:32), Max: 99.5 (2022 16:04)  HR: 83 (2022 07:32) (68 - 83)  BP: 119/64 (2022 07:32) (109/70 - 127/78)  BP(mean): --  RR: 19 (2022 07:32) (18 - 19)  SpO2: 96% (2022 07:32) (96% - 100%)    Parameters below as of 2022 07:32  Patient On (Oxygen Delivery Method): nasal cannula  O2 Flow (L/min): 2      ________________________________________________  PHYSICAL EXAM:  GENERAL: obese elderly female, not in acute distress  HEENT: Normocephalic;  conjunctivae and sclerae clear; moist mucous membranes;   NECK : supple  CHEST/LUNG: limited exam due to body habitus, mild wheezing+  HEART: S1 S2  regular; no murmurs, gallops or rubs  ABDOMEN: Soft, Nontender, Nondistended; Bowel sounds present  EXTREMITIES: 2+pitting edema, improving  NERVOUS SYSTEM:  Awake and alert; Oriented to place, person and time ; no new deficits    _________________________________________________  LABS:                        12.3   7.98  )-----------( 230      ( 2022 06:55 )             39.7     07-30    139  |  98  |  23<H>  ----------------------------<  126<H>  4.5   |  38<H>  |  0.94    Ca    8.9      2022 06:55  Phos  3.5     07-30  Mg     2.4     07-30    TPro  7.2  /  Alb  3.1<L>  /  TBili  0.4  /  DBili  x   /  AST  17  /  ALT  31  /  AlkPhos  129<H>  07-30      Urinalysis Basic - ( 2022 03:10 )    Color: Yellow / Appearance: Slightly Turbid / S.010 / pH: x  Gluc: x / Ketone: Negative  / Bili: Negative / Urobili: Negative   Blood: x / Protein: 15 / Nitrite: Negative   Leuk Esterase: Small / RBC: 2-5 /HPF / WBC 3-5 /HPF   Sq Epi: x / Non Sq Epi: Few /HPF / Bacteria: Moderate /HPF      CAPILLARY BLOOD GLUCOSE      POCT Blood Glucose.: 132 mg/dL (2022 07:46)  POCT Blood Glucose.: 137 mg/dL (2022 21:15)  POCT Blood Glucose.: 173 mg/dL (2022 16:58)  POCT Blood Glucose.: 192 mg/dL (2022 11:37)        RADIOLOGY & ADDITIONAL TESTS:    Imaging Personally Reviewed:  YES    Consultant(s) Notes Reviewed:   YES    Care Discussed with Consultants : YES    Plan of care was discussed with patient and /or primary care giver; all questions and concerns were addressed and care was aligned with patient's wishes.

## 2022-08-01 DIAGNOSIS — I27.20 PULMONARY HYPERTENSION, UNSPECIFIED: ICD-10-CM

## 2022-08-01 LAB
ALBUMIN SERPL ELPH-MCNC: 3 G/DL — LOW (ref 3.5–5)
ALP SERPL-CCNC: 132 U/L — HIGH (ref 40–120)
ALT FLD-CCNC: 28 U/L DA — SIGNIFICANT CHANGE UP (ref 10–60)
ANION GAP SERPL CALC-SCNC: 2 MMOL/L — LOW (ref 5–17)
AST SERPL-CCNC: 13 U/L — SIGNIFICANT CHANGE UP (ref 10–40)
BILIRUB SERPL-MCNC: 0.5 MG/DL — SIGNIFICANT CHANGE UP (ref 0.2–1.2)
BUN SERPL-MCNC: 28 MG/DL — HIGH (ref 7–18)
CALCIUM SERPL-MCNC: 8.8 MG/DL — SIGNIFICANT CHANGE UP (ref 8.4–10.5)
CHLORIDE SERPL-SCNC: 94 MMOL/L — LOW (ref 96–108)
CO2 SERPL-SCNC: 44 MMOL/L — HIGH (ref 22–31)
CREAT SERPL-MCNC: 0.88 MG/DL — SIGNIFICANT CHANGE UP (ref 0.5–1.3)
EGFR: 62 ML/MIN/1.73M2 — SIGNIFICANT CHANGE UP
GLUCOSE BLDC GLUCOMTR-MCNC: 151 MG/DL — HIGH (ref 70–99)
GLUCOSE BLDC GLUCOMTR-MCNC: 157 MG/DL — HIGH (ref 70–99)
GLUCOSE BLDC GLUCOMTR-MCNC: 209 MG/DL — HIGH (ref 70–99)
GLUCOSE BLDC GLUCOMTR-MCNC: 324 MG/DL — HIGH (ref 70–99)
GLUCOSE SERPL-MCNC: 115 MG/DL — HIGH (ref 70–99)
HCT VFR BLD CALC: 43.2 % — SIGNIFICANT CHANGE UP (ref 34.5–45)
HGB BLD-MCNC: 13.5 G/DL — SIGNIFICANT CHANGE UP (ref 11.5–15.5)
MAGNESIUM SERPL-MCNC: 2.4 MG/DL — SIGNIFICANT CHANGE UP (ref 1.6–2.6)
MCHC RBC-ENTMCNC: 31.1 PG — SIGNIFICANT CHANGE UP (ref 27–34)
MCHC RBC-ENTMCNC: 31.3 GM/DL — LOW (ref 32–36)
MCV RBC AUTO: 99.5 FL — SIGNIFICANT CHANGE UP (ref 80–100)
NRBC # BLD: 0 /100 WBCS — SIGNIFICANT CHANGE UP (ref 0–0)
PHOSPHATE SERPL-MCNC: 3.1 MG/DL — SIGNIFICANT CHANGE UP (ref 2.5–4.5)
PLATELET # BLD AUTO: 236 K/UL — SIGNIFICANT CHANGE UP (ref 150–400)
POTASSIUM SERPL-MCNC: 4.1 MMOL/L — SIGNIFICANT CHANGE UP (ref 3.5–5.3)
POTASSIUM SERPL-SCNC: 4.1 MMOL/L — SIGNIFICANT CHANGE UP (ref 3.5–5.3)
PROT SERPL-MCNC: 7.4 G/DL — SIGNIFICANT CHANGE UP (ref 6–8.3)
RBC # BLD: 4.34 M/UL — SIGNIFICANT CHANGE UP (ref 3.8–5.2)
RBC # FLD: 13.4 % — SIGNIFICANT CHANGE UP (ref 10.3–14.5)
SODIUM SERPL-SCNC: 140 MMOL/L — SIGNIFICANT CHANGE UP (ref 135–145)
WBC # BLD: 9.59 K/UL — SIGNIFICANT CHANGE UP (ref 3.8–10.5)
WBC # FLD AUTO: 9.59 K/UL — SIGNIFICANT CHANGE UP (ref 3.8–10.5)

## 2022-08-01 RX ORDER — FUROSEMIDE 40 MG
20 TABLET ORAL DAILY
Refills: 0 | Status: DISCONTINUED | OUTPATIENT
Start: 2022-08-01 | End: 2022-08-04

## 2022-08-01 RX ADMIN — Medication 40 MILLIGRAM(S): at 06:48

## 2022-08-01 RX ADMIN — AZITHROMYCIN 255 MILLIGRAM(S): 500 TABLET, FILM COATED ORAL at 21:13

## 2022-08-01 RX ADMIN — Medication 4: at 11:50

## 2022-08-01 RX ADMIN — Medication 1 DROP(S): at 17:26

## 2022-08-01 RX ADMIN — Medication 1: at 08:22

## 2022-08-01 RX ADMIN — PANTOPRAZOLE SODIUM 40 MILLIGRAM(S): 20 TABLET, DELAYED RELEASE ORAL at 06:49

## 2022-08-01 RX ADMIN — MONTELUKAST 10 MILLIGRAM(S): 4 TABLET, CHEWABLE ORAL at 11:47

## 2022-08-01 RX ADMIN — Medication 5 MILLIGRAM(S): at 21:14

## 2022-08-01 RX ADMIN — SIMVASTATIN 20 MILLIGRAM(S): 20 TABLET, FILM COATED ORAL at 21:14

## 2022-08-01 RX ADMIN — ALBUTEROL 1 PUFF(S): 90 AEROSOL, METERED ORAL at 11:50

## 2022-08-01 RX ADMIN — Medication 2: at 21:32

## 2022-08-01 RX ADMIN — APIXABAN 5 MILLIGRAM(S): 2.5 TABLET, FILM COATED ORAL at 17:25

## 2022-08-01 RX ADMIN — CEFTRIAXONE 100 MILLIGRAM(S): 500 INJECTION, POWDER, FOR SOLUTION INTRAMUSCULAR; INTRAVENOUS at 21:13

## 2022-08-01 RX ADMIN — ALBUTEROL 1 PUFF(S): 90 AEROSOL, METERED ORAL at 21:14

## 2022-08-01 RX ADMIN — Medication 40 MILLIGRAM(S): at 06:49

## 2022-08-01 RX ADMIN — CARVEDILOL PHOSPHATE 6.25 MILLIGRAM(S): 80 CAPSULE, EXTENDED RELEASE ORAL at 06:49

## 2022-08-01 RX ADMIN — CARVEDILOL PHOSPHATE 6.25 MILLIGRAM(S): 80 CAPSULE, EXTENDED RELEASE ORAL at 17:26

## 2022-08-01 RX ADMIN — APIXABAN 5 MILLIGRAM(S): 2.5 TABLET, FILM COATED ORAL at 06:49

## 2022-08-01 RX ADMIN — Medication 325 MILLIGRAM(S): at 11:47

## 2022-08-01 RX ADMIN — Medication 81 MILLIGRAM(S): at 11:47

## 2022-08-01 RX ADMIN — Medication 1 DROP(S): at 06:49

## 2022-08-01 NOTE — PROGRESS NOTE ADULT - SUBJECTIVE AND OBJECTIVE BOX
Patient is a 89y old  Female who presents with a chief complaint of Weakness, SOB (01 Aug 2022 08:24)  Awake, alert, comfortable out of bed to chair in NAD.    INTERVAL HPI/OVERNIGHT EVENTS:      VITAL SIGNS:  T(F): 98 (08-01-22 @ 07:12)  HR: 83 (08-01-22 @ 07:12)  BP: 124/76 (08-01-22 @ 07:12)  RR: 18 (08-01-22 @ 07:12)  SpO2: 98% (08-01-22 @ 07:12)  Wt(kg): --  I&O's Detail    31 Jul 2022 07:01  -  01 Aug 2022 07:00  --------------------------------------------------------  IN:  Total IN: 0 mL    OUT:    Voided (mL): 1200 mL  Total OUT: 1200 mL    Total NET: -1200 mL              REVIEW OF SYSTEMS:    CONSTITUTIONAL:  No fevers, chills, sweats    HEENT:  Eyes:  No diplopia or blurred vision. ENT:  No earache, sore throat or runny nose.    CARDIOVASCULAR:  No pressure, squeezing, tightness, or heaviness about the chest; no palpitations.    RESPIRATORY:  Per HPI    GASTROINTESTINAL:  No abdominal pain, nausea, vomiting or diarrhea.    GENITOURINARY:  No dysuria, frequency or urgency.    NEUROLOGIC:  No paresthesias, fasciculations, seizures or weakness.    PSYCHIATRIC:  No disorder of thought or mood.      PHYSICAL EXAM:    Constitutional: Well developed and nourished  Eyes:Perrla  ENMT: normal  Neck:supple  Respiratory: good air entry  Cardiovascular: S1 S2 regular  Gastrointestinal: Soft, Non tender  Extremities: + edema  Vascular:normal  Neurological:Awake, alert,Ox3  Musculoskeletal:Normal      MEDICATIONS  (STANDING):  ALBUTerol    90 MICROgram(s) HFA Inhaler 1 Puff(s) Inhalation two times a day  apixaban 5 milliGRAM(s) Oral two times a day  artificial  tears Solution 1 Drop(s) Both EYES two times a day  aspirin  chewable 81 milliGRAM(s) Oral daily  azithromycin  IVPB      azithromycin  IVPB 500 milliGRAM(s) IV Intermittent every 24 hours  carvedilol 6.25 milliGRAM(s) Oral every 12 hours  cefTRIAXone   IVPB      cefTRIAXone   IVPB 1000 milliGRAM(s) IV Intermittent every 24 hours  ferrous    sulfate 325 milliGRAM(s) Oral daily  furosemide   Injectable 40 milliGRAM(s) IV Push daily  insulin lispro (ADMELOG) corrective regimen sliding scale   SubCutaneous Before meals and at bedtime  melatonin 5 milliGRAM(s) Oral at bedtime  methylPREDNISolone sodium succinate Injectable 40 milliGRAM(s) IV Push daily  montelukast 10 milliGRAM(s) Oral daily  pantoprazole    Tablet 40 milliGRAM(s) Oral before breakfast  simvastatin 20 milliGRAM(s) Oral at bedtime    MEDICATIONS  (PRN):      Allergies    losartan (Angioedema)    Intolerances    Chicken (Stomach Upset)      LABS:                        13.5   9.59  )-----------( 236      ( 01 Aug 2022 07:26 )             43.2     08-01    140  |  94<L>  |  28<H>  ----------------------------<  115<H>  4.1   |  44<H>  |  0.88    Ca    8.8      01 Aug 2022 07:26  Phos  3.1     08-01  Mg     2.4     08-01    TPro  7.4  /  Alb  3.0<L>  /  TBili  0.5  /  DBili  x   /  AST  13  /  ALT  28  /  AlkPhos  132<H>  08-01              CAPILLARY BLOOD GLUCOSE      POCT Blood Glucose.: 157 mg/dL (01 Aug 2022 07:42)  POCT Blood Glucose.: 164 mg/dL (31 Jul 2022 21:12)  POCT Blood Glucose.: 184 mg/dL (31 Jul 2022 16:36)  POCT Blood Glucose.: 185 mg/dL (31 Jul 2022 11:47)    pro-bnp 2324 07-28 @ 15:30     d-dimer --  07-28 @ 15:30      RADIOLOGY & ADDITIONAL TESTS:    CXR:    Ct scan chest:    ekg;    echo:< from: Transthoracic Echocardiogram (07.29.22 @ 08:30) >  ------------------------------------------------------------------------  CONCLUSIONS:  1. Normal mitral valve.  2. Calcified trileaflet aortic valve with normal opening.  3. Aortic Root: 4.0 cm.  4. Normal left atrium.  LA volume index = 32 cc/m2.  5. Mild concentric left ventricular hypertrophy.  6. Normal Left Ventricular Systolic Function,  (EF = 55 to  60%)  7. Grade I diastolic dysfunction (Impaired relaxation,  mild).  8. Normal right atrium.  9. Normal right ventricular size and function.  10. RV systolic pressure is mildly increased at  39 mm Hg.  11. Normal tricuspid valve.  12. There is trace pulmonic regurgitation.  13. Normal pericardium with no pericardial effusion.      < end of copied text >

## 2022-08-01 NOTE — PROGRESS NOTE ADULT - ASSESSMENT
Patient is a 89y old  Female from home, ambulates using walker/wheelchair, lives with grand-daughter, with PMHx of COPD (not on home oxygen), Afib on Eliquis, HFpEF (Echo Nov 2021 EF 55-60%), prior DVT, DM, presents to the ER for evaluation of generalized weakness for 2 days. She went to see her PMD and found to be hypoxic at her PMD, Dr. Bianchi's office,  to 77%. She endorses leg swelling, but states that her leg swelling has been on and off for many years. On admission, she found to have no fever, no Leukocytosis but CXR shows  Ill-defined airspace opacities bilaterally which may be infectious or related to CHF and or not present previously. She has started on Ceftriaxone and Azithromycin, and the ID consult requested to assist with further evaluation and antibiotic management.     # Pneumonia  - on CXR- MRSA PCR and Legionella urine AG is negative  # CHF exacerbation    would recommend:    1. Diuresis as per Primary/cardiology team  2. Repeat CXR in AM  3. Continue Ceftriaxone and Azithromycin until 8/3/22, may change to oral Augmentin 875 mg q 12hours on discharge   4.. Keep Both LE elevated  5. Supplemental oxygenation and bronchodilator as needed      Attending Attestation:  Time-based billing (NON-critical care).   Spent more than 35 minutes on total encounter, more than 50 % of the visit was spent counseling and/or coordinating care by the Attending physician.  The necessity of the time spent during the encounter on this date of service is due to complexity of:    - Pneumonia  - CHF exacerbation  - Hypoxia- on supplemental oxygenation Patient is a 89y old  Female from home, ambulates using walker/wheelchair, lives with grand-daughter, with PMHx of COPD (not on home oxygen), Afib on Eliquis, HFpEF (Echo Nov 2021 EF 55-60%), prior DVT, DM, presents to the ER for evaluation of generalized weakness for 2 days. She went to see her PMD and found to be hypoxic at her PMD, Dr. Bianchi's office,  to 77%. She endorses leg swelling, but states that her leg swelling has been on and off for many years. On admission, she found to have no fever, no Leukocytosis but CXR shows  Ill-defined airspace opacities bilaterally which may be infectious or related to CHF and or not present previously. She has started on Ceftriaxone and Azithromycin, and the ID consult requested to assist with further evaluation and antibiotic management.     # Pneumonia  - on CXR- MRSA PCR and Legionella urine AG is negative  # CHF exacerbation    would recommend:    1. Repeat CXR to reassess the pulmonary edema  2. Diuresis as per Primary/cardiology team  3. Continue Ceftriaxone and Azithromycin until 8/3/22, may change to oral Augmentin 875 mg q 12hours on discharge X 2 more days  4.. Keep Both LE elevated  5. Supplemental oxygenation and bronchodilator as needed      Attending Attestation:  Time-based billing (NON-critical care).   Spent more than 35 minutes on total encounter, more than 50 % of the visit was spent counseling and/or coordinating care by the Attending physician.  The necessity of the time spent during the encounter on this date of service is due to complexity of:    - Pneumonia  - CHF exacerbation  - Hypoxia- on supplemental oxygenation

## 2022-08-01 NOTE — PROGRESS NOTE ADULT - PROBLEM SELECTOR PLAN 1
Pt p/w hypoxia to 77% at PMD's office, noted to have worsening lower extremity swelling, difficulty breathing  Possible due to medication non-compliance, lasix dose recently reduced outpatient     - EKG 7/28 - afib  - Trop: 197.5 >178, downtrending   - BNP: 2324 (from 750 last admission in Nov)  - pro calcitonin 0.05 on 7/30   -  sat 98% on 2L  on 8/1  - titrate oxygen off in reference to COPD target of 88-92 % , prevent overoxygenation  - Xray : Ill-defined airspace opacities bilaterally which may be infectious or related to CHF and or not present previously. Cardiomegaly with pulmonary venous engorgement.  - CTA 7/27 = small R sided pleural effusion, no evidence of PE     - Lasix dose decreased from 40 IV to 20 mg PO daily (BUN trending up) on 8/1   - strict I&Os, daily weights  - lower suspicion for pneumonia - pt. also on ceftriaxone, and azithromycin, today is Day 5 on 8/1  - As per ID: continue ABX until 8/3, may change to oral Augmentin 875 mg q 12hours on discharge

## 2022-08-01 NOTE — PROGRESS NOTE ADULT - SUBJECTIVE AND OBJECTIVE BOX
PATIENT SEEN AND EXAMINED ON :- 8/1/22  DATE OF SERVICE:  8/1/22           Interim events noted,Labs ,Radiological studies and Cardiology tests reviewed .       HOSPITAL COURSE: HPI:  Pt is a 89 yo Female, from home, ambulates using walker/wheelchair, lives with grand-daughter, with PMHx of COPD (not on home oxygen), Afib on Eliquis, HFpEF (Echo Nov 2021 EF 55-60%), prior DVT, DM, presenting with generalized weakness for 2 days. Pt is a poor historian--She attributed her weakness to the summer heat. Says her weakness worsened this morning, her symptoms worsened, which prompted her to go to her PMD. She was found to be hypoxic at her PMD Dr. Bianchi to 77%. She endorses leg swelling, but states that her leg swelling has been on and off for many years. Grand-daughter at bedside endorses that medications are usually given by patient's daughter to the patient, pt may have missed a few doses of Lasix. Endorses that family tries their best to have a salt restricted/fluid restricted diet. Papers from PMD office shows that pt is on Lasix 20 mg daily, was on Lasix 40 mg last month--unsure why dose was changed, family and patient unsure of the dosage as well. Pt endorses no chest pain, cough, sputum production, palpitations, cough, fever, abdominal pain, constipation, diarrhea, nausea, vomiting. She reports no increase in use of her nebulizer in the fast few days. No recent sick contacts, or travel.     In the ED, Vitals were:  Temp 98.4 F, 118/79, 77 HR, 98% on 2L (rechecked her O2 saturation, 85% on RA, 91% on 3L)  EKG Afib, Trop 197.5  Cxray suggestive of fluid overload (28 Jul 2022 19:43)      INTERIM EVENTS:Patient seen at bedside ,interim events noted.      PMH -reviewed admission note, no change since admission  HEART FAILURE: Acute[ ]Chronic[ ] Systolic[ ] Diastolic[ ] Combined Systolic and Diastolic[ ]  CAD[ ] CABG[ ] PCI[ ]  DEVICES[ ] PPM[ ] ICD[ ] ILR[ ]  ATRIAL FIBRILLATION[ ] Paroxysmal[ ] Permanent[ ] CHADS2-[  ]  JACOBO[ ] CKD1[ ] CKD2[ ] CKD3[ ] CKD4[ ] ESRD[ ]  COPD[ ] HTN[ ]   DM[ ] Type1[ ] Type 2[ ]   CVA[ ] Paresis[ ]    AMBULATION: Assisted[ ] Cane/walker[ ] Independent[ ]    MEDICATIONS  (STANDING):  ALBUTerol    90 MICROgram(s) HFA Inhaler 1 Puff(s) Inhalation two times a day  apixaban 5 milliGRAM(s) Oral two times a day  artificial  tears Solution 1 Drop(s) Both EYES two times a day  aspirin  chewable 81 milliGRAM(s) Oral daily  azithromycin  IVPB      azithromycin  IVPB 500 milliGRAM(s) IV Intermittent every 24 hours  carvedilol 6.25 milliGRAM(s) Oral every 12 hours  cefTRIAXone   IVPB 1000 milliGRAM(s) IV Intermittent every 24 hours  cefTRIAXone   IVPB      ferrous    sulfate 325 milliGRAM(s) Oral daily  furosemide    Tablet 20 milliGRAM(s) Oral daily  insulin lispro (ADMELOG) corrective regimen sliding scale   SubCutaneous Before meals and at bedtime  melatonin 5 milliGRAM(s) Oral at bedtime  methylPREDNISolone sodium succinate Injectable 40 milliGRAM(s) IV Push daily  montelukast 10 milliGRAM(s) Oral daily  pantoprazole    Tablet 40 milliGRAM(s) Oral before breakfast  simvastatin 20 milliGRAM(s) Oral at bedtime    MEDICATIONS  (PRN):            REVIEW OF SYSTEMS:  Constitutional: [ ] fever, [ ]weight loss,  [ ]fatigue [ ]weight gain  Eyes: [ ] visual changes  Respiratory: [ ]shortness of breath;  [ ] cough, [ ]wheezing, [ ]chills, [ ]hemoptysis  Cardiovascular: [ ] chest pain, [ ]palpitations, [ ]dizziness,  [ ]leg swelling[ ]orthopnea[ ]PND  Gastrointestinal: [ ] abdominal pain, [ ]nausea, [ ]vomiting,  [ ]diarrhea [ ]Constipation [ ]Melena  Genitourinary: [ ] dysuria, [ ] hematuria [ ][place]  Neurologic: [ ] headaches [ ] tremors[ ]weakness [ ]Paralysis Right[ ] Left[ ]  Skin: [ ] itching, [ ]burning, [ ] rashes  Endocrine: [ ] heat or cold intolerance  Musculoskeletal: [ ] joint pain or swelling; [ ] muscle, back, or extremity pain  Psychiatric: [ ] depression, [ ]anxiety, [ ]mood swings, or [ ]difficulty sleeping  Hematologic: [ ] easy bruising, [ ] bleeding gums    [ ] All remaining systems negative except as per above.   [ ]Unable to obtain.  [x] No change in ROS since admission      Vital Signs Last 24 Hrs  T(C): 37.1 (01 Aug 2022 20:41), Max: 37.1 (01 Aug 2022 20:41)  T(F): 98.8 (01 Aug 2022 20:41), Max: 98.8 (01 Aug 2022 20:41)  HR: 79 (01 Aug 2022 20:41) (67 - 83)  BP: 131/86 (01 Aug 2022 20:41) (122/67 - 140/87)  BP(mean): --  RR: 18 (01 Aug 2022 20:41) (18 - 18)  SpO2: 98% (01 Aug 2022 20:41) (94% - 100%)    Parameters below as of 01 Aug 2022 20:41  Patient On (Oxygen Delivery Method): nasal cannula  O2 Flow (L/min): 2    I&O's Summary    31 Jul 2022 07:01  -  01 Aug 2022 07:00  --------------------------------------------------------  IN: 0 mL / OUT: 1200 mL / NET: -1200 mL        PHYSICAL EXAM:  General: No acute distress BMI-  HEENT: EOMI, PERRL  Neck: Supple, [ ] JVD  Lungs: Equal air entry bilaterally; [ ] rales [ ] wheezing [ ] rhonchi  Heart: Regular rate and rhythm; [x ] murmur   2/6 [ x] systolic [ ] diastolic [ ] radiation[ ] rubs [ ]  gallops  Abdomen: Nontender, bowel sounds present  Extremities: No clubbing, cyanosis, [ ] edema [ ]Pulses  equal and intact  Nervous system:  Alert & Oriented X3, no focal deficits  Psychiatric: Normal affect  Skin: No rashes or lesions    LABS:  08-01    140  |  94<L>  |  28<H>  ----------------------------<  115<H>  4.1   |  44<H>  |  0.88    Ca    8.8      01 Aug 2022 07:26  Phos  3.1     08-01  Mg     2.4     08-01    TPro  7.4  /  Alb  3.0<L>  /  TBili  0.5  /  DBili  x   /  AST  13  /  ALT  28  /  AlkPhos  132<H>  08-01    Creatinine Trend: 0.88<--, 0.97<--, 0.94<--, 0.98<--, 1.01<--                        13.5   9.59  )-----------( 236      ( 01 Aug 2022 07:26 )             43.2

## 2022-08-01 NOTE — PROGRESS NOTE ADULT - PROBLEM SELECTOR PLAN 1
Pt p/w hypoxia to 77% at PMD's office, noted to have worsening lower extremity swelling, difficulty breathing  Possible due to medication non-compliance, lasix dose recently reduced outpatient   - 92% on 3L, will maintain O2 sats 88-92% given COPD hx, prevent over oxygenation  - Xray : Ill-defined airspace opacities bilaterally which may be infectious or related to CHF and or not present previously. Cardiomegaly with pulmonary venous engorgement.  - Although pt appears to have a more sedentary lifestyle, doubt PE in the setting of Eliquis use at home  - EKG 7/28 - afib  - Trop: 197.5 >178, downtrending   - BNP: 2324 (from 750 last admission in Nov)  - pro calcitonin 0.05 on 7/30   - CTA 7/27 = small R sided pleural effusion, no evidence of PE   - Lasix dose decreased from 40 IV to 20 mg PO daily (BUN trending up)  - strict I&Os, daily weights  - lower suspicion for pneumonia - pt. also on ceftriaxone, and azithromycin, today is Day 5 on 8/1  - As per ID: continue ABX until 8/3, may change to oral Augmentin 875 mg q 12hours on discharge   - Dr. Denton  cardiology on board   - ID on board Pt p/w hypoxia to 77% at PMD's office, noted to have worsening lower extremity swelling, difficulty breathing  Possible due to medication non-compliance, lasix dose recently reduced outpatient     - EKG 7/28 - afib  - Trop: 197.5 >178, downtrending   - BNP: 2324 (from 750 last admission in Nov)  - pro calcitonin 0.05 on 7/30   -  sat 98% on 2L  on 8/1  - titrate oxygen off in reference to COPD target of 88-92 % , prevent overoxygenation  - Xray : Ill-defined airspace opacities bilaterally which may be infectious or related to CHF and or not present previously. Cardiomegaly with pulmonary venous engorgement.  - CTA 7/27 = small R sided pleural effusion, no evidence of PE     - Lasix dose decreased from 40 IV to 20 mg PO daily (BUN trending up) on 8/1   - strict I&Os, daily weights  - lower suspicion for pneumonia - pt. also on ceftriaxone, and azithromycin, today is Day 5 on 8/1  - As per ID: continue ABX until 8/3, may change to oral Augmentin 875 mg q 12hours on discharge   - Dr. Denton  cardiology on board   - ID on board

## 2022-08-01 NOTE — PHYSICAL THERAPY INITIAL EVALUATION ADULT - GENERAL OBSERVATIONS, REHAB EVAL
Pt. received seated in chair, AxOX2, grand dtr. present. Patient w/ IV line, O2 @ 3 li NC, w/ edema to both LE and ankles.

## 2022-08-01 NOTE — PROGRESS NOTE ADULT - SUBJECTIVE AND OBJECTIVE BOX
PGY-1 Progress Note discussed with attending    PAGER #: [[phone]] TILL 5:00 PM  PLEASE CONTACT ON CALL TEAM:  - On Call Team (Please refer to Cassy) FROM 5:00 PM - 8:30PM  - Nightfloat Team FROM 8:30 -7:30 AM    CHIEF COMPLAINT & BRIEF HOSPITAL COURSE:  Patient is a 89y old  Female who presents with a chief complaint of Weakness, SOB (01 Aug 2022 10:51)    INTERVAL HPI/OVERNIGHT EVENTS:   Patient was seen and examined at bedside. No acute overnight events. Patient denied any shortness of breath, comfortable lying down, denied any chest pain, dizziness, nausea /vomiting, diarrhea/constipation.     REVIEW OF SYSTEMS:  CONSTITUTIONAL: No fever, weight loss, or fatigue  RESPIRATORY: No cough, wheezing, chills or hemoptysis; No shortness of breath  CARDIOVASCULAR: No chest pain, palpitations, dizziness, or leg swelling  GASTROINTESTINAL: No abdominal pain. No nausea, vomiting, or hematemesis; No diarrhea or constipation. No melena or hematochezia.  GENITOURINARY: No dysuria or hematuria, urinary frequency  NEUROLOGICAL: No headaches, memory loss, loss of strength, numbness, or tremors  SKIN: No itching, burning, rashes, or lesions     Vital Signs Last 24 Hrs  T(C): 36.7 (01 Aug 2022 07:12), Max: 36.9 (31 Jul 2022 23:30)  T(F): 98 (01 Aug 2022 07:12), Max: 98.4 (31 Jul 2022 23:30)  HR: 83 (01 Aug 2022 07:12) (81 - 90)  BP: 124/76 (01 Aug 2022 07:12) (122/74 - 140/87)  BP(mean): --  RR: 18 (01 Aug 2022 07:12) (18 - 19)  SpO2: 98% (01 Aug 2022 07:12) (94% - 98%)    Parameters below as of 01 Aug 2022 07:12  Patient On (Oxygen Delivery Method): nasal cannula  O2 Flow (L/min): 2      PHYSICAL EXAMINATION:  GENERAL: NAD, well built  HEAD:  Atraumatic, Normocephalic  EYES:  conjunctiva and sclera clear  NECK: Supple, No JVD, Normal thyroid  CHEST/LUNG: Clear to auscultation. Clear to percussion bilaterally; No rales, rhonchi, wheezing, or rubs  HEART: Regular rate and rhythm; No murmurs, rubs, or gallops  ABDOMEN: Soft, Nontender, Nondistended; Bowel sounds present  NERVOUS SYSTEM:  Alert & Oriented X3,    EXTREMITIES:  2+ Peripheral Pulses, No clubbing, cyanosis, or edema  SKIN: warm dry                          13.5   9.59  )-----------( 236      ( 01 Aug 2022 07:26 )             43.2     08-01    140  |  94<L>  |  28<H>  ----------------------------<  115<H>  4.1   |  44<H>  |  0.88    Ca    8.8      01 Aug 2022 07:26  Phos  3.1     08-01  Mg     2.4     08-01    TPro  7.4  /  Alb  3.0<L>  /  TBili  0.5  /  DBili  x   /  AST  13  /  ALT  28  /  AlkPhos  132<H>  08-01    LIVER FUNCTIONS - ( 01 Aug 2022 07:26 )  Alb: 3.0 g/dL / Pro: 7.4 g/dL / ALK PHOS: 132 U/L / ALT: 28 U/L DA / AST: 13 U/L / GGT: x                   CAPILLARY BLOOD GLUCOSE      RADIOLOGY & ADDITIONAL TESTS:

## 2022-08-01 NOTE — PROGRESS NOTE ADULT - SUBJECTIVE AND OBJECTIVE BOX
Patient is seen and examined at the bed side, is afebrile. The MRSA PCR and Legionella urine AG are negative.       REVIEW OF SYSTEMS: All other review systems are negative       ALLERGIES: losartan (Angioedema)      \Vital Signs Last 24 Hrs  T(C): 36.7 (01 Aug 2022 07:12), Max: 36.9 (2022 23:30)  T(F): 98 (01 Aug 2022 07:12), Max: 98.4 (2022 23:30)  HR: 78 (01 Aug 2022 11:24) (78 - 90)  BP: 122/67 (01 Aug 2022 11:24) (122/67 - 140/87)  BP(mean): --  RR: 18 (01 Aug 2022 07:12) (18 - 19)  SpO2: 99% (01 Aug 2022 11:24) (94% - 99%)    Parameters below as of 01 Aug 2022 07:12  Patient On (Oxygen Delivery Method): nasal cannula  O2 Flow (L/min): 2      PHYSICAL EXAM:  GENERAL: Not in distress, on oxygen via NC  CHEST/LUNG:  Not using accessory muscles   HEART: s1 and s2 present  ABDOMEN:  Nontender and  Nondistended  EXTREMITIES: B/L pedal  edema  CNS: Awake and Alert      LABS:                        13.5   9.59  )-----------( 236      ( 01 Aug 2022 07:26 )             43.2                           13.2   8.34  )-----------( 236      ( 2022 07:03 )             42.9       08-01    140  |  94<L>  |  28<H>  ----------------------------<  115<H>  4.1   |  44<H>  |  0.88    Ca    8.8      01 Aug 2022 07:26  Phos  3.1     08-  Mg     2.4     08-    TPro  7.4  /  Alb  3.0<L>  /  TBili  0.5  /  DBili  x   /  AST  13  /  ALT  28  /  AlkPhos  132<H>      141  |  97  |  26<H>  ----------------------------<  114<H>  4.2   |  41<H>  |  0.97    Ca    8.7      2022 07:03  Phos  3.6       Mg     2.4         TPro  7.3  /  Alb  3.2<L>  /  TBili  0.4  /  DBili  x   /  AST  15  /  ALT  29  /  AlkPhos  124<H>          CAPILLARY BLOOD GLUCOSE  POCT Blood Glucose.: 192 mg/dL (2022 11:37)  POCT Blood Glucose.: 189 mg/dL (2022 07:37)  POCT Blood Glucose.: 205 mg/dL (2022 00:05)        Urinalysis Basic - ( 2022 03:10 )  Color: Yellow / Appearance: Slightly Turbid / S.010 / pH: x  Gluc: x / Ketone: Negative  / Bili: Negative / Urobili: Negative   Blood: x / Protein: 15 / Nitrite: Negative   Leuk Esterase: Small / RBC: 2-5 /HPF / WBC 3-5 /HPF   Sq Epi: x / Non Sq Epi: Few /HPF / Bacteria: Moderate /HPF      MEDICATIONS  (STANDING):    ALBUTerol    90 MICROgram(s) HFA Inhaler 1 Puff(s) Inhalation two times a day  apixaban 5 milliGRAM(s) Oral two times a day  artificial  tears Solution 1 Drop(s) Both EYES two times a day  aspirin  chewable 81 milliGRAM(s) Oral daily  azithromycin  IVPB      azithromycin  IVPB 500 milliGRAM(s) IV Intermittent every 24 hours  carvedilol 6.25 milliGRAM(s) Oral every 12 hours  cefTRIAXone   IVPB      cefTRIAXone   IVPB 1000 milliGRAM(s) IV Intermittent every 24 hours  ferrous    sulfate 325 milliGRAM(s) Oral daily  furosemide    Tablet 20 milliGRAM(s) Oral daily  insulin lispro (ADMELOG) corrective regimen sliding scale   SubCutaneous Before meals and at bedtime  melatonin 5 milliGRAM(s) Oral at bedtime  methylPREDNISolone sodium succinate Injectable 40 milliGRAM(s) IV Push daily  montelukast 10 milliGRAM(s) Oral daily  pantoprazole    Tablet 40 milliGRAM(s) Oral before breakfast  simvastatin 20 milliGRAM(s) Oral at bedtime    RADIOLOGY & ADDITIONAL TESTS:      22: CT Angio Chest PE Protocol w/ IV Cont (22 @ 22:52) Limited evaluation for segmental and subsegmental pulmonary embolism. No   main, left right, or lobar pulmonary embolism.      22: Xray Chest 1 View- PORTABLE-Urgent (22 @ 15:55) >  1. Ill-defined airspace opacities bilaterally which may be infectious or related to CHF and or not present previously.  2. Cardiomegaly with pulmonary venous engorgement.  3. Elevated right hemidiaphragm, unchanged.      MICROBIOLOGY DATA:    Legionella pneumophila Antigen, Urine (22 @ 21:40)   Legionella Antigen, Urine: Negative    MRSA/MSSA PCR (22 @ 17:50)   MRSA PCR Result.: NotDetec:     Respiratory Viral Panel with COVID-19 by PATRICK (22 @ 15:30)   Rapid RVP Result: NotDetec   SARS-CoV-2: NotDetec:             Patient is seen and examined at the bed side, is afebrile. She is sleeping since morning, the family at the bed side.       REVIEW OF SYSTEMS: All other review systems are negative       ALLERGIES: losartan (Angioedema)      \Vital Signs Last 24 Hrs  T(C): 36.7 (01 Aug 2022 07:12), Max: 36.9 (2022 23:30)  T(F): 98 (01 Aug 2022 07:12), Max: 98.4 (2022 23:30)  HR: 78 (01 Aug 2022 11:24) (78 - 90)  BP: 122/67 (01 Aug 2022 11:24) (122/67 - 140/87)  BP(mean): --  RR: 18 (01 Aug 2022 07:12) (18 - 19)  SpO2: 99% (01 Aug 2022 11:24) (94% - 99%)    Parameters below as of 01 Aug 2022 07:12  Patient On (Oxygen Delivery Method): nasal cannula  O2 Flow (L/min): 2      PHYSICAL EXAM:  GENERAL: Not in distress, on oxygen via NC  CHEST/LUNG:  Not using accessory muscles   HEART: s1 and s2 present  ABDOMEN:  Nontender and  Nondistended  EXTREMITIES: B/L pedal  edema  CNS: sleepy      LABS:                        13.5   9.59  )-----------( 236      ( 01 Aug 2022 07:26 )             43.2                           13.2   8.34  )-----------( 236      ( 2022 07:03 )             42.9       08-01    140  |  94<L>  |  28<H>  ----------------------------<  115<H>  4.1   |  44<H>  |  0.88    Ca    8.8      01 Aug 2022 07:26  Phos  3.1     08  Mg     2.4         TPro  7.4  /  Alb  3.0<L>  /  TBili  0.5  /  DBili  x   /  AST  13  /  ALT  28  /  AlkPhos  132<H>  08    141  |  97  |  26<H>  ----------------------------<  114<H>  4.2   |  41<H>  |  0.97    Ca    8.7      2022 07:03  Phos  3.6       Mg     2.4         TPro  7.3  /  Alb  3.2<L>  /  TBili  0.4  /  DBili  x   /  AST  15  /  ALT  29  /  AlkPhos  124<H>  07        CAPILLARY BLOOD GLUCOSE  POCT Blood Glucose.: 192 mg/dL (2022 11:37)  POCT Blood Glucose.: 189 mg/dL (2022 07:37)  POCT Blood Glucose.: 205 mg/dL (2022 00:05)        Urinalysis Basic - ( 2022 03:10 )  Color: Yellow / Appearance: Slightly Turbid / S.010 / pH: x  Gluc: x / Ketone: Negative  / Bili: Negative / Urobili: Negative   Blood: x / Protein: 15 / Nitrite: Negative   Leuk Esterase: Small / RBC: 2-5 /HPF / WBC 3-5 /HPF   Sq Epi: x / Non Sq Epi: Few /HPF / Bacteria: Moderate /HPF      MEDICATIONS  (STANDING):    ALBUTerol    90 MICROgram(s) HFA Inhaler 1 Puff(s) Inhalation two times a day  apixaban 5 milliGRAM(s) Oral two times a day  artificial  tears Solution 1 Drop(s) Both EYES two times a day  aspirin  chewable 81 milliGRAM(s) Oral daily  azithromycin  IVPB      azithromycin  IVPB 500 milliGRAM(s) IV Intermittent every 24 hours  carvedilol 6.25 milliGRAM(s) Oral every 12 hours  cefTRIAXone   IVPB      cefTRIAXone   IVPB 1000 milliGRAM(s) IV Intermittent every 24 hours  ferrous    sulfate 325 milliGRAM(s) Oral daily  furosemide    Tablet 20 milliGRAM(s) Oral daily  insulin lispro (ADMELOG) corrective regimen sliding scale   SubCutaneous Before meals and at bedtime  melatonin 5 milliGRAM(s) Oral at bedtime  methylPREDNISolone sodium succinate Injectable 40 milliGRAM(s) IV Push daily  montelukast 10 milliGRAM(s) Oral daily  pantoprazole    Tablet 40 milliGRAM(s) Oral before breakfast  simvastatin 20 milliGRAM(s) Oral at bedtime    RADIOLOGY & ADDITIONAL TESTS:      22: CT Angio Chest PE Protocol w/ IV Cont (22 @ 22:52) Limited evaluation for segmental and subsegmental pulmonary embolism. No   main, left right, or lobar pulmonary embolism.      22: Xray Chest 1 View- PORTABLE-Urgent (22 @ 15:55) >  1. Ill-defined airspace opacities bilaterally which may be infectious or related to CHF and or not present previously.  2. Cardiomegaly with pulmonary venous engorgement.  3. Elevated right hemidiaphragm, unchanged.      MICROBIOLOGY DATA:    Legionella pneumophila Antigen, Urine (22 @ 21:40)   Legionella Antigen, Urine: Negative    MRSA/MSSA PCR (22 @ 17:50)   MRSA PCR Result.: NotDetec:     Respiratory Viral Panel with COVID-19 by PATRICK (22 @ 15:30)   Rapid RVP Result: NotDetec   SARS-CoV-2: NotDetec:

## 2022-08-02 LAB
ALBUMIN SERPL ELPH-MCNC: 3.2 G/DL — LOW (ref 3.5–5)
ALP SERPL-CCNC: 134 U/L — HIGH (ref 40–120)
ALT FLD-CCNC: 27 U/L DA — SIGNIFICANT CHANGE UP (ref 10–60)
ANION GAP SERPL CALC-SCNC: 6 MMOL/L — SIGNIFICANT CHANGE UP (ref 5–17)
AST SERPL-CCNC: 12 U/L — SIGNIFICANT CHANGE UP (ref 10–40)
BASOPHILS # BLD AUTO: 0.01 K/UL — SIGNIFICANT CHANGE UP (ref 0–0.2)
BASOPHILS NFR BLD AUTO: 0.1 % — SIGNIFICANT CHANGE UP (ref 0–2)
BILIRUB SERPL-MCNC: 0.6 MG/DL — SIGNIFICANT CHANGE UP (ref 0.2–1.2)
BUN SERPL-MCNC: 22 MG/DL — HIGH (ref 7–18)
CALCIUM SERPL-MCNC: 9.2 MG/DL — SIGNIFICANT CHANGE UP (ref 8.4–10.5)
CHLORIDE SERPL-SCNC: 91 MMOL/L — LOW (ref 96–108)
CO2 SERPL-SCNC: 41 MMOL/L — HIGH (ref 22–31)
CREAT SERPL-MCNC: 1.03 MG/DL — SIGNIFICANT CHANGE UP (ref 0.5–1.3)
EGFR: 52 ML/MIN/1.73M2 — LOW
EOSINOPHIL # BLD AUTO: 0.13 K/UL — SIGNIFICANT CHANGE UP (ref 0–0.5)
EOSINOPHIL NFR BLD AUTO: 1.8 % — SIGNIFICANT CHANGE UP (ref 0–6)
GLUCOSE BLDC GLUCOMTR-MCNC: 135 MG/DL — HIGH (ref 70–99)
GLUCOSE BLDC GLUCOMTR-MCNC: 154 MG/DL — HIGH (ref 70–99)
GLUCOSE BLDC GLUCOMTR-MCNC: 155 MG/DL — HIGH (ref 70–99)
GLUCOSE BLDC GLUCOMTR-MCNC: 221 MG/DL — HIGH (ref 70–99)
GLUCOSE SERPL-MCNC: 235 MG/DL — HIGH (ref 70–99)
HCT VFR BLD CALC: 46 % — HIGH (ref 34.5–45)
HGB BLD-MCNC: 14.3 G/DL — SIGNIFICANT CHANGE UP (ref 11.5–15.5)
IMM GRANULOCYTES NFR BLD AUTO: 0.4 % — SIGNIFICANT CHANGE UP (ref 0–1.5)
LYMPHOCYTES # BLD AUTO: 0.71 K/UL — LOW (ref 1–3.3)
LYMPHOCYTES # BLD AUTO: 10 % — LOW (ref 13–44)
MAGNESIUM SERPL-MCNC: 2.3 MG/DL — SIGNIFICANT CHANGE UP (ref 1.6–2.6)
MCHC RBC-ENTMCNC: 30.5 PG — SIGNIFICANT CHANGE UP (ref 27–34)
MCHC RBC-ENTMCNC: 31.1 GM/DL — LOW (ref 32–36)
MCV RBC AUTO: 98.1 FL — SIGNIFICANT CHANGE UP (ref 80–100)
MONOCYTES # BLD AUTO: 0.18 K/UL — SIGNIFICANT CHANGE UP (ref 0–0.9)
MONOCYTES NFR BLD AUTO: 2.5 % — SIGNIFICANT CHANGE UP (ref 2–14)
NEUTROPHILS # BLD AUTO: 6.04 K/UL — SIGNIFICANT CHANGE UP (ref 1.8–7.4)
NEUTROPHILS NFR BLD AUTO: 85.2 % — HIGH (ref 43–77)
NRBC # BLD: 0 /100 WBCS — SIGNIFICANT CHANGE UP (ref 0–0)
PHOSPHATE SERPL-MCNC: 2.8 MG/DL — SIGNIFICANT CHANGE UP (ref 2.5–4.5)
PLATELET # BLD AUTO: 260 K/UL — SIGNIFICANT CHANGE UP (ref 150–400)
POTASSIUM SERPL-MCNC: 4.2 MMOL/L — SIGNIFICANT CHANGE UP (ref 3.5–5.3)
POTASSIUM SERPL-SCNC: 4.2 MMOL/L — SIGNIFICANT CHANGE UP (ref 3.5–5.3)
PROT SERPL-MCNC: 7.6 G/DL — SIGNIFICANT CHANGE UP (ref 6–8.3)
RBC # BLD: 4.69 M/UL — SIGNIFICANT CHANGE UP (ref 3.8–5.2)
RBC # FLD: 13.4 % — SIGNIFICANT CHANGE UP (ref 10.3–14.5)
SODIUM SERPL-SCNC: 138 MMOL/L — SIGNIFICANT CHANGE UP (ref 135–145)
WBC # BLD: 7.1 K/UL — SIGNIFICANT CHANGE UP (ref 3.8–10.5)
WBC # FLD AUTO: 7.1 K/UL — SIGNIFICANT CHANGE UP (ref 3.8–10.5)

## 2022-08-02 RX ADMIN — Medication 20 MILLIGRAM(S): at 06:01

## 2022-08-02 RX ADMIN — Medication 1: at 08:02

## 2022-08-02 RX ADMIN — SIMVASTATIN 20 MILLIGRAM(S): 20 TABLET, FILM COATED ORAL at 21:38

## 2022-08-02 RX ADMIN — ALBUTEROL 1 PUFF(S): 90 AEROSOL, METERED ORAL at 11:41

## 2022-08-02 RX ADMIN — Medication 40 MILLIGRAM(S): at 06:03

## 2022-08-02 RX ADMIN — CARVEDILOL PHOSPHATE 6.25 MILLIGRAM(S): 80 CAPSULE, EXTENDED RELEASE ORAL at 18:44

## 2022-08-02 RX ADMIN — AZITHROMYCIN 255 MILLIGRAM(S): 500 TABLET, FILM COATED ORAL at 22:27

## 2022-08-02 RX ADMIN — Medication 1 DROP(S): at 18:43

## 2022-08-02 RX ADMIN — CEFTRIAXONE 100 MILLIGRAM(S): 500 INJECTION, POWDER, FOR SOLUTION INTRAMUSCULAR; INTRAVENOUS at 21:38

## 2022-08-02 RX ADMIN — MONTELUKAST 10 MILLIGRAM(S): 4 TABLET, CHEWABLE ORAL at 12:46

## 2022-08-02 RX ADMIN — ALBUTEROL 1 PUFF(S): 90 AEROSOL, METERED ORAL at 21:38

## 2022-08-02 RX ADMIN — PANTOPRAZOLE SODIUM 40 MILLIGRAM(S): 20 TABLET, DELAYED RELEASE ORAL at 06:02

## 2022-08-02 RX ADMIN — Medication 325 MILLIGRAM(S): at 12:46

## 2022-08-02 RX ADMIN — CARVEDILOL PHOSPHATE 6.25 MILLIGRAM(S): 80 CAPSULE, EXTENDED RELEASE ORAL at 06:01

## 2022-08-02 RX ADMIN — Medication 1: at 17:08

## 2022-08-02 RX ADMIN — Medication 2: at 11:53

## 2022-08-02 RX ADMIN — APIXABAN 5 MILLIGRAM(S): 2.5 TABLET, FILM COATED ORAL at 18:43

## 2022-08-02 RX ADMIN — APIXABAN 5 MILLIGRAM(S): 2.5 TABLET, FILM COATED ORAL at 06:01

## 2022-08-02 RX ADMIN — Medication 81 MILLIGRAM(S): at 12:46

## 2022-08-02 RX ADMIN — Medication 1 DROP(S): at 06:01

## 2022-08-02 NOTE — PROGRESS NOTE ADULT - SUBJECTIVE AND OBJECTIVE BOX
Patient is seen and examined at the bed side, is afebrile. She is sleeping since morning, the family at the bed side.       REVIEW OF SYSTEMS: All other review systems are negative       ALLERGIES: losartan (Angioedema)      Vital Signs Last 24 Hrs  T(C): 36.8 (02 Aug 2022 11:28), Max: 37.1 (01 Aug 2022 20:41)  T(F): 98.2 (02 Aug 2022 11:28), Max: 98.8 (01 Aug 2022 20:41)  HR: 77 (02 Aug 2022 11:) (67 - 79)  BP: 150/80 (02 Aug 2022 11:) (131/86 - 154/98)  BP(mean): --  RR: 19 (02 Aug 2022 11:) (18 - 19)  SpO2: 97% (02 Aug 2022 11:) (93% - 100%)    Parameters below as of 02 Aug 2022 11:28  Patient On (Oxygen Delivery Method): nasal cannula  O2 Flow (L/min): 2      PHYSICAL EXAM:  GENERAL: Not in distress, on oxygen via NC  CHEST/LUNG:  Not using accessory muscles   HEART: s1 and s2 present  ABDOMEN:  Nontender and  Nondistended  EXTREMITIES: B/L pedal  edema  CNS: sleepy      LABS:                        14.3   7.10  )-----------( 260      ( 02 Aug 2022 10:48 )             46.0                           13.5   9.59  )-----------( 236      ( 01 Aug 2022 07:26 )             43.2       08-02    138  |  91<L>  |  22<H>  ----------------------------<  235<H>  4.2   |  41<H>  |  1.03    Ca    9.2      02 Aug 2022 10:48  Phos  2.8     08-  Mg     2.3     08-02    TPro  7.6  /  Alb  3.2<L>  /  TBili  0.6  /  DBili  x   /  AST  12  /  ALT  27  /  AlkPhos  134<H>  08-02    08-01    140  |  94<L>  |  28<H>  ----------------------------<  115<H>  4.1   |  44<H>  |  0.88    Ca    8.8      01 Aug 2022 07:26  Phos  3.1       Mg     2.4         TPro  7.4  /  Alb  3.0<L>  /  TBili  0.5  /  DBili  x   /  AST  13  /  ALT  28  /  AlkPhos  132<H>          CAPILLARY BLOOD GLUCOSE  POCT Blood Glucose.: 192 mg/dL (2022 11:37)  POCT Blood Glucose.: 189 mg/dL (2022 07:37)  POCT Blood Glucose.: 205 mg/dL (2022 00:05)        Urinalysis Basic - ( 2022 03:10 )  Color: Yellow / Appearance: Slightly Turbid / S.010 / pH: x  Gluc: x / Ketone: Negative  / Bili: Negative / Urobili: Negative   Blood: x / Protein: 15 / Nitrite: Negative   Leuk Esterase: Small / RBC: 2-5 /HPF / WBC 3-5 /HPF   Sq Epi: x / Non Sq Epi: Few /HPF / Bacteria: Moderate /HPF      MEDICATIONS  (STANDING):    ALBUTerol    90 MICROgram(s) HFA Inhaler 1 Puff(s) Inhalation two times a day  apixaban 5 milliGRAM(s) Oral two times a day  artificial  tears Solution 1 Drop(s) Both EYES two times a day  aspirin  chewable 81 milliGRAM(s) Oral daily  azithromycin  IVPB      azithromycin  IVPB 500 milliGRAM(s) IV Intermittent every 24 hours  carvedilol 6.25 milliGRAM(s) Oral every 12 hours  cefTRIAXone   IVPB      cefTRIAXone   IVPB 1000 milliGRAM(s) IV Intermittent every 24 hours  ferrous    sulfate 325 milliGRAM(s) Oral daily  furosemide    Tablet 20 milliGRAM(s) Oral daily  insulin lispro (ADMELOG) corrective regimen sliding scale   SubCutaneous Before meals and at bedtime  melatonin 5 milliGRAM(s) Oral at bedtime  methylPREDNISolone sodium succinate Injectable 40 milliGRAM(s) IV Push daily  montelukast 10 milliGRAM(s) Oral daily  pantoprazole    Tablet 40 milliGRAM(s) Oral before breakfast  simvastatin 20 milliGRAM(s) Oral at bedtime      RADIOLOGY & ADDITIONAL TESTS:    22: CT Angio Chest PE Protocol w/ IV Cont (22 @ 22:52) Limited evaluation for segmental and subsegmental pulmonary embolism. No   main, left right, or lobar pulmonary embolism.      22: Xray Chest 1 View- PORTABLE-Urgent (22 @ 15:55) >  1. Ill-defined airspace opacities bilaterally which may be infectious or related to CHF and or not present previously.  2. Cardiomegaly with pulmonary venous engorgement.  3. Elevated right hemidiaphragm, unchanged.      MICROBIOLOGY DATA:    Legionella pneumophila Antigen, Urine (22 @ 21:40)   Legionella Antigen, Urine: Negative    MRSA/MSSA PCR (22 @ 17:50)   MRSA PCR Result.: NotDetec:     Respiratory Viral Panel with COVID-19 by PATRICK (22 @ 15:30)   Rapid RVP Result: NotDetec   SARS-CoV-2: NotDetec:           Patient is seen and examined at the bed side, is afebrile. She is awake and  fully alert today, doing good, OFF supplemental oxygenation and saturating well in Room air.      REVIEW OF SYSTEMS: All other review systems are negative       ALLERGIES: losartan (Angioedema)      Vital Signs Last 24 Hrs  T(C): 36.8 (02 Aug 2022 11:28), Max: 37.1 (01 Aug 2022 20:41)  T(F): 98.2 (02 Aug 2022 11:28), Max: 98.8 (01 Aug 2022 20:41)  HR: 77 (02 Aug 2022 11:) (67 - 79)  BP: 150/80 (02 Aug 2022 11:) (131/86 - 154/98)  BP(mean): --  RR: 19 (02 Aug 2022 11:) (18 - 19)  SpO2: 97% (02 Aug 2022 11:) (93% - 100%)    Parameters below as of 02 Aug 2022 11:28  Patient On (Oxygen Delivery Method): nasal cannula  O2 Flow (L/min): 2      PHYSICAL EXAM:  GENERAL: Not in distress, off oxygen   CHEST/LUNG:  Not using accessory muscles   HEART: s1 and s2 present  ABDOMEN:  Nontender and  Nondistended  EXTREMITIES: B/L pedal  edema almost resolved  CNS: Awake and alert      LABS:                        14.3   7.10  )-----------( 260      ( 02 Aug 2022 10:48 )             46.0                           13.5   9.59  )-----------( 236      ( 01 Aug 2022 07:26 )             43.2       08-02    138  |  91<L>  |  22<H>  ----------------------------<  235<H>  4.2   |  41<H>  |  1.03    Ca    9.2      02 Aug 2022 10:48  Phos  2.8     08-02  Mg     2.3     08-02    TPro  7.6  /  Alb  3.2<L>  /  TBili  0.6  /  DBili  x   /  AST  12  /  ALT  27  /  AlkPhos  134<H>  08-02    08-01    140  |  94<L>  |  28<H>  ----------------------------<  115<H>  4.1   |  44<H>  |  0.88    Ca    8.8      01 Aug 2022 07:26  Phos  3.1       Mg     2.4         TPro  7.4  /  Alb  3.0<L>  /  TBili  0.5  /  DBili  x   /  AST  13  /  ALT  28  /  AlkPhos  132<H>          CAPILLARY BLOOD GLUCOSE  POCT Blood Glucose.: 192 mg/dL (2022 11:37)  POCT Blood Glucose.: 189 mg/dL (2022 07:37)  POCT Blood Glucose.: 205 mg/dL (2022 00:05)        Urinalysis Basic - ( 2022 03:10 )  Color: Yellow / Appearance: Slightly Turbid / S.010 / pH: x  Gluc: x / Ketone: Negative  / Bili: Negative / Urobili: Negative   Blood: x / Protein: 15 / Nitrite: Negative   Leuk Esterase: Small / RBC: 2-5 /HPF / WBC 3-5 /HPF   Sq Epi: x / Non Sq Epi: Few /HPF / Bacteria: Moderate /HPF      MEDICATIONS  (STANDING):    ALBUTerol    90 MICROgram(s) HFA Inhaler 1 Puff(s) Inhalation two times a day  apixaban 5 milliGRAM(s) Oral two times a day  artificial  tears Solution 1 Drop(s) Both EYES two times a day  aspirin  chewable 81 milliGRAM(s) Oral daily  azithromycin  IVPB      azithromycin  IVPB 500 milliGRAM(s) IV Intermittent every 24 hours  carvedilol 6.25 milliGRAM(s) Oral every 12 hours  cefTRIAXone   IVPB      cefTRIAXone   IVPB 1000 milliGRAM(s) IV Intermittent every 24 hours  ferrous    sulfate 325 milliGRAM(s) Oral daily  furosemide    Tablet 20 milliGRAM(s) Oral daily  insulin lispro (ADMELOG) corrective regimen sliding scale   SubCutaneous Before meals and at bedtime  melatonin 5 milliGRAM(s) Oral at bedtime  methylPREDNISolone sodium succinate Injectable 40 milliGRAM(s) IV Push daily  montelukast 10 milliGRAM(s) Oral daily  pantoprazole    Tablet 40 milliGRAM(s) Oral before breakfast  simvastatin 20 milliGRAM(s) Oral at bedtime      RADIOLOGY & ADDITIONAL TESTS:    22: CT Angio Chest PE Protocol w/ IV Cont (22 @ 22:52) Limited evaluation for segmental and subsegmental pulmonary embolism. No   main, left right, or lobar pulmonary embolism.      22: Xray Chest 1 View- PORTABLE-Urgent (22 @ 15:55) >  1. Ill-defined airspace opacities bilaterally which may be infectious or related to CHF and or not present previously.  2. Cardiomegaly with pulmonary venous engorgement.  3. Elevated right hemidiaphragm, unchanged.      MICROBIOLOGY DATA:    Legionella pneumophila Antigen, Urine (22 @ 21:40)   Legionella Antigen, Urine: Negative    MRSA/MSSA PCR (22 @ 17:50)   MRSA PCR Result.: NotDetec:     Respiratory Viral Panel with COVID-19 by PATRICK (22 @ 15:30)   Rapid RVP Result: NotDetec   SARS-CoV-2: NotDetec:

## 2022-08-02 NOTE — PROGRESS NOTE ADULT - PROBLEM SELECTOR PLAN 1
Pt p/w hypoxia to 77% at PMD's office, noted to have worsening lower extremity swelling, difficulty breathing  Possible due to medication non-compliance, lasix dose recently reduced outpatient     - EKG 7/28 - afib  - Trop: 197.5 >178, downtrending   - BNP: 2324 (from 750 last admission in Nov)  - pro calcitonin 0.05 on 7/30   - titrate oxygen off in reference to COPD target of 88-92 % , prevent over oxygenation  - Xray : Ill-defined airspace opacities bilaterally which may be infectious or related to CHF and or not present previously. Cardiomegaly with pulmonary venous engorgement.  - CTA 7/27 = small R sided pleural effusion, no evidence of PE     - Lasix dose decreased from 40 IV to 20 mg PO daily (BUN trending up) on 8/1   - strict I&Os, daily weights  - lower suspicion for pneumonia - pt. also on ceftriaxone, and azithromycin, today is Day 6 on 8/2  - As per ID: continue ABX until 8/3, may change to oral Augmentin 875 mg q 12hours on discharge   - Dr. Denton  cardiology on board   - ID on board  - PO Abx on 8/3  - discharge on 8/3 after completed the course of abx

## 2022-08-02 NOTE — PROGRESS NOTE ADULT - PROBLEM SELECTOR PLAN 1
Pt p/w hypoxia to 77% at PMD's office, noted to have worsening lower extremity swelling, difficulty breathing  Possible due to medication non-compliance, lasix dose recently reduced outpatient     - EKG 7/28 - afib  - Trop: 197.5 >178, downtrending   - BNP: 2324 (from 750 last admission in Nov)  - pro calcitonin 0.05 on 7/30   - titrate oxygen off in reference to COPD target of 88-92 % , prevent over oxygenation  - Xray : Ill-defined airspace opacities bilaterally which may be infectious or related to CHF and or not present previously. Cardiomegaly with pulmonary venous engorgement.  - CTA 7/27 = small R sided pleural effusion, no evidence of PE     - Lasix dose decreased from 40 IV to 20 mg PO daily (BUN trending up) on 8/1   - strict I&Os, daily weights  - lower suspicion for pneumonia - pt. also on ceftriaxone, and azithromycin, today is Day 6 on 8/2  - As per ID: continue ABX until 8/3, may change to oral Augmentin 875 mg q 12hours on discharge   - Dr. Detnon  cardiology on board   - ID on board  - PO Abx on 8/3  - discharge on 8/3 after completed the course of abx

## 2022-08-02 NOTE — PROGRESS NOTE ADULT - SUBJECTIVE AND OBJECTIVE BOX
PGY-1 Progress Note discussed with attending    PAGER #: [[phone]] TILL 5:00 PM  PLEASE CONTACT ON CALL TEAM:  - On Call Team (Please refer to Cassy) FROM 5:00 PM - 8:30PM  - Nightfloat Team FROM 8:30 -7:30 AM    CHIEF COMPLAINT & BRIEF HOSPITAL COURSE:  Patient is a 89y old  Female who presents with a chief complaint of Weakness, SOB (02 Aug 2022 15:49)    INTERVAL HPI/OVERNIGHT EVENTS:   Patient was examined while she was lying in bed, Aox3, responding appropriately to questions, in NAD. No acute overnight events. Pt denied any chest pain, shortness of breath, nausea, vomiting or abdominal pain. He has normal bowel and bladder movements, and denies any other acute complaints. Upon examination patient was not on oxygen and not in distress, or any SOB.     REVIEW OF SYSTEMS:  CONSTITUTIONAL: No fever, weight loss, or fatigue  RESPIRATORY: No cough, wheezing, chills or hemoptysis; No shortness of breath,   CARDIOVASCULAR: No chest pain, palpitations, dizziness, has leg swelling improved compared to 8/1  GASTROINTESTINAL: No abdominal pain. No nausea, vomiting, or hematemesis; No diarrhea , has constipation since admission   GENITOURINARY: No dysuria or hematuria, urinary frequency  NEUROLOGICAL: No headaches, memory loss, loss of strength, numbness, or tremors  SKIN: No itching, burning, rashes, or lesions       Vital Signs Last 24 Hrs  T(C): 36.8 (02 Aug 2022 11:28), Max: 37.1 (01 Aug 2022 20:41)  T(F): 98.2 (02 Aug 2022 11:28), Max: 98.8 (01 Aug 2022 20:41)  HR: 77 (02 Aug 2022 11:28) (74 - 79)  BP: 150/80 (02 Aug 2022 11:28) (131/86 - 154/98)  BP(mean): --  RR: 19 (02 Aug 2022 11:28) (18 - 19)  SpO2: 97% (02 Aug 2022 11:28) (93% - 98%)    Parameters below as of 02 Aug 2022 11:28  Patient On (Oxygen Delivery Method): nasal cannula  O2 Flow (L/min): 2      PHYSICAL EXAMINATION:  GENERAL: NAD, obese   HEAD:  Atraumatic, Normocephalic  EYES:  conjunctiva and sclera clear  NECK: Supple, No JVD, Normal thyroid  CHEST/LUNG: Clear to auscultation. Clear to percussion bilaterally; No rales, rhonchi, wheezing, or rubs  HEART: Regular rate and rhythm; No murmurs, rubs, or gallops, +1 bilateral pitting edema improved compared to 8/1   GASTROINTESTINAL: Abdomen soft , No tenderness, bowel sound present   NEUROLOGICAL: 5/5 Strength in Upper and Lower extremities.

## 2022-08-02 NOTE — PROGRESS NOTE ADULT - ASSESSMENT
Patient is a 89y old  Female from home, ambulates using walker/wheelchair, lives with grand-daughter, with PMHx of COPD (not on home oxygen), Afib on Eliquis, HFpEF (Echo Nov 2021 EF 55-60%), prior DVT, DM, presents to the ER for evaluation of generalized weakness for 2 days. She went to see her PMD and found to be hypoxic at her PMD, Dr. Bianchi's office,  to 77%. She endorses leg swelling, but states that her leg swelling has been on and off for many years. On admission, she found to have no fever, no Leukocytosis but CXR shows  Ill-defined airspace opacities bilaterally which may be infectious or related to CHF and or not present previously. She has started on Ceftriaxone and Azithromycin, and the ID consult requested to assist with further evaluation and antibiotic management.     # Pneumonia  - on CXR- MRSA PCR and Legionella urine AG is negative  # CHF exacerbation    would recommend:    1. Repeat CXR to reassess the pulmonary edema  2. Diuresis as per Primary/cardiology team  3. Continue Ceftriaxone and Azithromycin until 8/3/22, may change to oral Augmentin 875 mg q 12hours on discharge X 2 more days  4.. Keep Both LE elevated  5. Supplemental oxygenation and bronchodilator as needed      Attending Attestation:  Time-based billing (NON-critical care).   Spent more than 35 minutes on total encounter, more than 50 % of the visit was spent counseling and/or coordinating care by the Attending physician.  The necessity of the time spent during the encounter on this date of service is due to complexity of:    - Pneumonia  - CHF exacerbation  - Hypoxia- on supplemental oxygenation   Patient is a 89y old  Female from home, ambulates using walker/wheelchair, lives with grand-daughter, with PMHx of COPD (not on home oxygen), Afib on Eliquis, HFpEF (Echo Nov 2021 EF 55-60%), prior DVT, DM, presents to the ER for evaluation of generalized weakness for 2 days. She went to see her PMD and found to be hypoxic at her PMD, Dr. Bianchi's office,  to 77%. She endorses leg swelling, but states that her leg swelling has been on and off for many years. On admission, she found to have no fever, no Leukocytosis but CXR shows  Ill-defined airspace opacities bilaterally which may be infectious or related to CHF and or not present previously. She has started on Ceftriaxone and Azithromycin, and the ID consult requested to assist with further evaluation and antibiotic management.     # Pneumonia  - on CXR- MRSA PCR and Legionella urine AG is negative  # CHF exacerbation- repeat CXR pending    would recommend:    1. OOB to chair   2. Diuresis as per Primary/cardiology team  3. Continue Ceftriaxone and Azithromycin until 8/3/22, may change to oral Augmentin 875 mg q 12hours on discharge X 1 more day  4.. Keep Both LE elevated  5. Supplemental oxygenation and bronchodilator as needed    d/w Family at the be side and Nursing staff     Attending Attestation:  Time-based billing (NON-critical care).   Spent more than 35 minutes on total encounter, more than 50 % of the visit was spent counseling and/or coordinating care by the Attending physician.  The necessity of the time spent during the encounter on this date of service is due to complexity of:    - Pneumonia  - CHF exacerbation  - Hypoxia- on supplemental oxygenation

## 2022-08-02 NOTE — PROGRESS NOTE ADULT - SUBJECTIVE AND OBJECTIVE BOX
Patient is a 89y old  Female who presents with a chief complaint of Weakness, SOB (02 Aug 2022 10:18)  Awake, alert, laying in bed in NAD. Doing well on oxygen supp via NC. Leg edema improved    INTERVAL HPI/OVERNIGHT EVENTS:      VITAL SIGNS:  T(F): 97.8 (08-02-22 @ 07:25)  HR: 78 (08-02-22 @ 07:25)  BP: 154/98 (08-02-22 @ 07:25)  RR: 19 (08-02-22 @ 07:25)  SpO2: 95% (08-02-22 @ 07:25)  Wt(kg): --  I&O's Detail    02 Aug 2022 07:01  -  02 Aug 2022 11:02  --------------------------------------------------------  IN:  Total IN: 0 mL    OUT:    Voided (mL): 1000 mL  Total OUT: 1000 mL    Total NET: -1000 mL              REVIEW OF SYSTEMS:    CONSTITUTIONAL:  No fevers, chills, sweats    HEENT:  Eyes:  No diplopia or blurred vision. ENT:  No earache, sore throat or runny nose.    CARDIOVASCULAR:  No pressure, squeezing, tightness, or heaviness about the chest; no palpitations.    RESPIRATORY:  Per HPI    GASTROINTESTINAL:  No abdominal pain, nausea, vomiting or diarrhea.    GENITOURINARY:  No dysuria, frequency or urgency.    NEUROLOGIC:  No paresthesias, fasciculations, seizures or weakness.    PSYCHIATRIC:  No disorder of thought or mood.      PHYSICAL EXAM:    Constitutional: Well developed and nourished  Eyes:Perrla  ENMT: normal  Neck:supple  Respiratory: good air entry  Cardiovascular: S1 S2 regular  Gastrointestinal: Soft, Non tender  Extremities: No edema  Vascular:normal  Neurological:Awake, alert,Ox3  Musculoskeletal:Normal      MEDICATIONS  (STANDING):  ALBUTerol    90 MICROgram(s) HFA Inhaler 1 Puff(s) Inhalation two times a day  apixaban 5 milliGRAM(s) Oral two times a day  artificial  tears Solution 1 Drop(s) Both EYES two times a day  aspirin  chewable 81 milliGRAM(s) Oral daily  azithromycin  IVPB      azithromycin  IVPB 500 milliGRAM(s) IV Intermittent every 24 hours  carvedilol 6.25 milliGRAM(s) Oral every 12 hours  cefTRIAXone   IVPB      cefTRIAXone   IVPB 1000 milliGRAM(s) IV Intermittent every 24 hours  ferrous    sulfate 325 milliGRAM(s) Oral daily  furosemide    Tablet 20 milliGRAM(s) Oral daily  insulin lispro (ADMELOG) corrective regimen sliding scale   SubCutaneous Before meals and at bedtime  melatonin 5 milliGRAM(s) Oral at bedtime  methylPREDNISolone sodium succinate Injectable 40 milliGRAM(s) IV Push daily  montelukast 10 milliGRAM(s) Oral daily  pantoprazole    Tablet 40 milliGRAM(s) Oral before breakfast  simvastatin 20 milliGRAM(s) Oral at bedtime    MEDICATIONS  (PRN):      Allergies    losartan (Angioedema)    Intolerances    Chicken (Stomach Upset)      LABS:                        13.5   9.59  )-----------( 236      ( 01 Aug 2022 07:26 )             43.2     08-01    140  |  94<L>  |  28<H>  ----------------------------<  115<H>  4.1   |  44<H>  |  0.88    Ca    8.8      01 Aug 2022 07:26  Phos  3.1     08-01  Mg     2.4     08-01    TPro  7.4  /  Alb  3.0<L>  /  TBili  0.5  /  DBili  x   /  AST  13  /  ALT  28  /  AlkPhos  132<H>  08-01              CAPILLARY BLOOD GLUCOSE      POCT Blood Glucose.: 155 mg/dL (02 Aug 2022 07:40)  POCT Blood Glucose.: 209 mg/dL (01 Aug 2022 21:18)  POCT Blood Glucose.: 151 mg/dL (01 Aug 2022 16:35)  POCT Blood Glucose.: 324 mg/dL (01 Aug 2022 11:21)    pro-bnp 2324 07-28 @ 15:30     d-dimer --  07-28 @ 15:30      RADIOLOGY & ADDITIONAL TESTS:    CXR:  < from: Xray Chest 1 View- PORTABLE-Urgent (07.28.22 @ 15:55) >  IMPRESSION:  1. Ill-defined airspace opacities bilaterally which may be infectious or   related to CHF and or not present previously.  2. Cardiomegaly with pulmonary venous engorgement.  3. Elevated right hemidiaphragm, unchanged.      < end of copied text >    Ct scan chest:    ekg;    echo:

## 2022-08-02 NOTE — PROGRESS NOTE ADULT - SUBJECTIVE AND OBJECTIVE BOX
PATIENT SEEN AND EXAMINED ON :- 8/2/22  DATE OF SERVICE:   8/2/22          Interim events noted,Labs ,Radiological studies and Cardiology tests reviewed .       HOSPITAL COURSE: HPI:  Pt is a 89 yo Female, from home, ambulates using walker/wheelchair, lives with grand-daughter, with PMHx of COPD (not on home oxygen), Afib on Eliquis, HFpEF (Echo Nov 2021 EF 55-60%), prior DVT, DM, presenting with generalized weakness for 2 days. Pt is a poor historian--She attributed her weakness to the summer heat. Says her weakness worsened this morning, her symptoms worsened, which prompted her to go to her PMD. She was found to be hypoxic at her PMD Dr. Bianchi to 77%. She endorses leg swelling, but states that her leg swelling has been on and off for many years. Grand-daughter at bedside endorses that medications are usually given by patient's daughter to the patient, pt may have missed a few doses of Lasix. Endorses that family tries their best to have a salt restricted/fluid restricted diet. Papers from PMD office shows that pt is on Lasix 20 mg daily, was on Lasix 40 mg last month--unsure why dose was changed, family and patient unsure of the dosage as well. Pt endorses no chest pain, cough, sputum production, palpitations, cough, fever, abdominal pain, constipation, diarrhea, nausea, vomiting. She reports no increase in use of her nebulizer in the fast few days. No recent sick contacts, or travel.     In the ED, Vitals were:  Temp 98.4 F, 118/79, 77 HR, 98% on 2L (rechecked her O2 saturation, 85% on RA, 91% on 3L)  EKG Afib, Trop 197.5  Cxray suggestive of fluid overload (28 Jul 2022 19:43)      INTERIM EVENTS:Patient seen at bedside ,interim events noted.      PMH -reviewed admission note, no change since admission  HEART FAILURE: Acute[ ]Chronic[ ] Systolic[ ] Diastolic[ ] Combined Systolic and Diastolic[ ]  CAD[ ] CABG[ ] PCI[ ]  DEVICES[ ] PPM[ ] ICD[ ] ILR[ ]  ATRIAL FIBRILLATION[ ] Paroxysmal[ ] Permanent[ ] CHADS2-[  ]  JACOBO[ ] CKD1[ ] CKD2[ ] CKD3[ ] CKD4[ ] ESRD[ ]  COPD[ ] HTN[ ]   DM[ ] Type1[ ] Type 2[ ]   CVA[ ] Paresis[ ]    AMBULATION: Assisted[ ] Cane/walker[ ] Independent[ ]    MEDICATIONS  (STANDING):  ALBUTerol    90 MICROgram(s) HFA Inhaler 1 Puff(s) Inhalation two times a day  apixaban 5 milliGRAM(s) Oral two times a day  artificial  tears Solution 1 Drop(s) Both EYES two times a day  aspirin  chewable 81 milliGRAM(s) Oral daily  azithromycin  IVPB      azithromycin  IVPB 500 milliGRAM(s) IV Intermittent every 24 hours  carvedilol 6.25 milliGRAM(s) Oral every 12 hours  cefTRIAXone   IVPB      cefTRIAXone   IVPB 1000 milliGRAM(s) IV Intermittent every 24 hours  ferrous    sulfate 325 milliGRAM(s) Oral daily  furosemide    Tablet 20 milliGRAM(s) Oral daily  insulin lispro (ADMELOG) corrective regimen sliding scale   SubCutaneous Before meals and at bedtime  melatonin 5 milliGRAM(s) Oral at bedtime  methylPREDNISolone sodium succinate Injectable 40 milliGRAM(s) IV Push daily  montelukast 10 milliGRAM(s) Oral daily  pantoprazole    Tablet 40 milliGRAM(s) Oral before breakfast  simvastatin 20 milliGRAM(s) Oral at bedtime    MEDICATIONS  (PRN):            REVIEW OF SYSTEMS:  Constitutional: [ ] fever, [ ]weight loss,  [ ]fatigue [ ]weight gain  Eyes: [ ] visual changes  Respiratory: [ ]shortness of breath;  [ ] cough, [ ]wheezing, [ ]chills, [ ]hemoptysis  Cardiovascular: [ ] chest pain, [ ]palpitations, [ ]dizziness,  [ ]leg swelling[ ]orthopnea[ ]PND  Gastrointestinal: [ ] abdominal pain, [ ]nausea, [ ]vomiting,  [ ]diarrhea [ ]Constipation [ ]Melena  Genitourinary: [ ] dysuria, [ ] hematuria [ ][place]  Neurologic: [ ] headaches [ ] tremors[ ]weakness [ ]Paralysis Right[ ] Left[ ]  Skin: [ ] itching, [ ]burning, [ ] rashes  Endocrine: [ ] heat or cold intolerance  Musculoskeletal: [ ] joint pain or swelling; [ ] muscle, back, or extremity pain  Psychiatric: [ ] depression, [ ]anxiety, [ ]mood swings, or [ ]difficulty sleeping  Hematologic: [ ] easy bruising, [ ] bleeding gums    [ ] All remaining systems negative except as per above.   [ ]Unable to obtain.  [x] No change in ROS since admission      Vital Signs Last 24 Hrs  T(C): 36.9 (02 Aug 2022 20:43), Max: 36.9 (01 Aug 2022 23:31)  T(F): 98.4 (02 Aug 2022 20:43), Max: 98.5 (01 Aug 2022 23:31)  HR: 75 (02 Aug 2022 20:43) (74 - 78)  BP: 147/99 (02 Aug 2022 20:43) (134/78 - 154/98)  BP(mean): --  RR: 19 (02 Aug 2022 20:43) (18 - 19)  SpO2: 96% (02 Aug 2022 20:43) (93% - 97%)    Parameters below as of 02 Aug 2022 20:43  Patient On (Oxygen Delivery Method): nasal cannula  O2 Flow (L/min): 2    I&O's Summary    02 Aug 2022 07:01  -  02 Aug 2022 21:30  --------------------------------------------------------  IN: 0 mL / OUT: 1800 mL / NET: -1800 mL        PHYSICAL EXAM:  General: No acute distress BMI-  HEENT: EOMI, PERRL  Neck: Supple, [ ] JVD  Lungs: Equal air entry bilaterally; [ ] rales [ ] wheezing [ ] rhonchi  Heart: Regular rate and rhythm; [x ] murmur   2/6 [ x] systolic [ ] diastolic [ ] radiation[ ] rubs [ ]  gallops  Abdomen: Nontender, bowel sounds present  Extremities: No clubbing, cyanosis, [ ] edema [ ]Pulses  equal and intact  Nervous system:  Alert & Oriented X3, no focal deficits  Psychiatric: Normal affect  Skin: No rashes or lesions    LABS:  08-02    138  |  91<L>  |  22<H>  ----------------------------<  235<H>  4.2   |  41<H>  |  1.03    Ca    9.2      02 Aug 2022 10:48  Phos  2.8     08-02  Mg     2.3     08-02    TPro  7.6  /  Alb  3.2<L>  /  TBili  0.6  /  DBili  x   /  AST  12  /  ALT  27  /  AlkPhos  134<H>  08-02    Creatinine Trend: 1.03<--, 0.88<--, 0.97<--, 0.94<--, 0.98<--, 1.01<--                        14.3   7.10  )-----------( 260      ( 02 Aug 2022 10:48 )             46.0

## 2022-08-02 NOTE — PROGRESS NOTE ADULT - SUBJECTIVE AND OBJECTIVE BOX
Patient was seen and examined  Patient is a 89y old  Female who presents with a chief complaint of Weakness, SOB (01 Aug 2022 15:28)      INTERVAL HPI/OVERNIGHT EVENTS:  T(C): 36.6 (08-02-22 @ 07:25), Max: 37.1 (08-01-22 @ 20:41)  HR: 78 (08-02-22 @ 07:25) (67 - 79)  BP: 154/98 (08-02-22 @ 07:25) (122/67 - 154/98)  RR: 19 (08-02-22 @ 07:25) (18 - 19)  SpO2: 95% (08-02-22 @ 07:25) (93% - 100%)  Wt(kg): --  I&O's Summary    02 Aug 2022 07:01  -  02 Aug 2022 10:18  --------------------------------------------------------  IN: 0 mL / OUT: 1000 mL / NET: -1000 mL        LABS:                        13.5   9.59  )-----------( 236      ( 01 Aug 2022 07:26 )             43.2     08-01    140  |  94<L>  |  28<H>  ----------------------------<  115<H>  4.1   |  44<H>  |  0.88    Ca    8.8      01 Aug 2022 07:26  Phos  3.1     08-01  Mg     2.4     08-01    TPro  7.4  /  Alb  3.0<L>  /  TBili  0.5  /  DBili  x   /  AST  13  /  ALT  28  /  AlkPhos  132<H>  08-01        CAPILLARY BLOOD GLUCOSE      POCT Blood Glucose.: 155 mg/dL (02 Aug 2022 07:40)  POCT Blood Glucose.: 209 mg/dL (01 Aug 2022 21:18)  POCT Blood Glucose.: 151 mg/dL (01 Aug 2022 16:35)  POCT Blood Glucose.: 324 mg/dL (01 Aug 2022 11:21)              MEDICATIONS  (STANDING):  ALBUTerol    90 MICROgram(s) HFA Inhaler 1 Puff(s) Inhalation two times a day  apixaban 5 milliGRAM(s) Oral two times a day  artificial  tears Solution 1 Drop(s) Both EYES two times a day  aspirin  chewable 81 milliGRAM(s) Oral daily  azithromycin  IVPB      azithromycin  IVPB 500 milliGRAM(s) IV Intermittent every 24 hours  carvedilol 6.25 milliGRAM(s) Oral every 12 hours  cefTRIAXone   IVPB      cefTRIAXone   IVPB 1000 milliGRAM(s) IV Intermittent every 24 hours  ferrous    sulfate 325 milliGRAM(s) Oral daily  furosemide    Tablet 20 milliGRAM(s) Oral daily  insulin lispro (ADMELOG) corrective regimen sliding scale   SubCutaneous Before meals and at bedtime  melatonin 5 milliGRAM(s) Oral at bedtime  methylPREDNISolone sodium succinate Injectable 40 milliGRAM(s) IV Push daily  montelukast 10 milliGRAM(s) Oral daily  pantoprazole    Tablet 40 milliGRAM(s) Oral before breakfast  simvastatin 20 milliGRAM(s) Oral at bedtime    MEDICATIONS  (PRN):      RADIOLOGY & ADDITIONAL TESTS:    Imaging Personally Reviewed:  [ ] YES  [ ] NO    REVIEW OF SYSTEMS:  CONSTITUTIONAL: No fever, weight loss, or fatigue  EYES: No eye pain, visual disturbances, or discharge  ENMT:  No difficulty hearing, tinnitus, vertigo; No sinus or throat pain  NECK: No pain or stiffness  BREASTS: No pain, masses, or nipple discharge  RESPIRATORY: No cough, wheezing, chills or hemoptysis; No shortness of breath  CARDIOVASCULAR: No chest pain, palpitations, dizziness, or leg swelling  GASTROINTESTINAL: No abdominal or epigastric pain. No nausea, vomiting, or hematemesis; No diarrhea or constipation. No melena or hematochezia.  GENITOURINARY: No dysuria, frequency, hematuria, or incontinence  NEUROLOGICAL: No headaches, memory loss, loss of strength, numbness, or tremors  SKIN: No itching, burning, rashes, or lesions   LYMPH NODES: No enlarged glands  ENDOCRINE: No heat or cold intolerance; No hair loss  MUSCULOSKELETAL: No joint pain or swelling; No muscle, back, or extremity pain  PSYCHIATRIC: No depression, anxiety, mood swings, or difficulty sleeping  HEME/LYMPH: No easy bruising, or bleeding gums  ALLERY AND IMMUNOLOGIC: No hives or eczema      Consultant(s) Notes Reviewed:  [ ] YES  [ ] NO    PHYSICAL EXAM:  GENERAL: NAD, well-groomed, well-developed  HEAD:  Atraumatic, Normocephalic  EYES: EOMI, PERRLA, conjunctiva and sclera clear  ENMT: No tonsillar erythema, exudates, or enlargement; Moist mucous membranes, Good dentition, No lesions  NECK: Supple, No JVD, Normal thyroid  NERVOUS SYSTEM:  Alert & Oriented X3, Good concentration; Motor Strength 5/5 B/L upper and lower extremities; DTRs 2+ intact and symmetric  CHEST/LUNG: Clear to percussion bilaterally; No rales, rhonchi, wheezing, or rubs  HEART: Regular rate and rhythm; No murmurs, rubs, or gallops  ABDOMEN: Soft, Nontender, Nondistended; Bowel sounds present  EXTREMITIES:  2+ Peripheral Pulses, No clubbing, cyanosis, or edema  LYMPH: No lymphadenopathy noted  SKIN: No rashes or lesions    Care Discussed with Consultants/Other Providers [ x] YES  [ ] NO

## 2022-08-02 NOTE — PROGRESS NOTE ADULT - PROBLEM SELECTOR PLAN 1
Pt p/w hypoxia to 77% at PMD's office, noted to have worsening lower extremity swelling, difficulty breathing  Possible due to medication non-compliance, lasix dose recently reduced outpatient     - EKG 7/28 - afib  - Trop: 197.5 >178, downtrending   - BNP: 2324 (from 750 last admission in Nov)  - pro calcitonin 0.05 on 7/30   -  sat 98% on 2L  on 8/1  - titrate oxygen off in reference to COPD target of 88-92 % , prevent overoxygenation  - Xray : Ill-defined airspace opacities bilaterally which may be infectious or related to CHF and or not present previously. Cardiomegaly with pulmonary venous engorgement.  - CTA 7/27 = small R sided pleural effusion, no evidence of PE     - Lasix dose decreased from 40 IV to 20 mg PO daily (BUN trending up) on 8/1   - strict I&Os, daily weights  - lower suspicion for pneumonia - pt. also on ceftriaxone, and azithromycin, today is Day 5 on 8/1  - As per ID: continue ABX until 8/3, may change to oral Augmentin 875 mg q 12hours on discharge   - Dr. Denton  cardiology on board   - ID on board  CHANGE TO PO ABX ON 8/3

## 2022-08-03 LAB
ANION GAP SERPL CALC-SCNC: 3 MMOL/L — LOW (ref 5–17)
BUN SERPL-MCNC: 20 MG/DL — HIGH (ref 7–18)
CALCIUM SERPL-MCNC: 9.8 MG/DL — SIGNIFICANT CHANGE UP (ref 8.4–10.5)
CHLORIDE SERPL-SCNC: 93 MMOL/L — LOW (ref 96–108)
CO2 SERPL-SCNC: 43 MMOL/L — HIGH (ref 22–31)
CREAT SERPL-MCNC: 0.93 MG/DL — SIGNIFICANT CHANGE UP (ref 0.5–1.3)
EGFR: 58 ML/MIN/1.73M2 — LOW
GLUCOSE BLDC GLUCOMTR-MCNC: 173 MG/DL — HIGH (ref 70–99)
GLUCOSE BLDC GLUCOMTR-MCNC: 174 MG/DL — HIGH (ref 70–99)
GLUCOSE BLDC GLUCOMTR-MCNC: 200 MG/DL — HIGH (ref 70–99)
GLUCOSE BLDC GLUCOMTR-MCNC: 229 MG/DL — HIGH (ref 70–99)
GLUCOSE SERPL-MCNC: 155 MG/DL — HIGH (ref 70–99)
HCT VFR BLD CALC: 45.1 % — HIGH (ref 34.5–45)
HGB BLD-MCNC: 14.5 G/DL — SIGNIFICANT CHANGE UP (ref 11.5–15.5)
MAGNESIUM SERPL-MCNC: 2.5 MG/DL — SIGNIFICANT CHANGE UP (ref 1.6–2.6)
MCHC RBC-ENTMCNC: 30.7 PG — SIGNIFICANT CHANGE UP (ref 27–34)
MCHC RBC-ENTMCNC: 32.2 GM/DL — SIGNIFICANT CHANGE UP (ref 32–36)
MCV RBC AUTO: 95.3 FL — SIGNIFICANT CHANGE UP (ref 80–100)
NRBC # BLD: 0 /100 WBCS — SIGNIFICANT CHANGE UP (ref 0–0)
PHOSPHATE SERPL-MCNC: 2.8 MG/DL — SIGNIFICANT CHANGE UP (ref 2.5–4.5)
PLATELET # BLD AUTO: 273 K/UL — SIGNIFICANT CHANGE UP (ref 150–400)
POTASSIUM SERPL-MCNC: 4.3 MMOL/L — SIGNIFICANT CHANGE UP (ref 3.5–5.3)
POTASSIUM SERPL-SCNC: 4.3 MMOL/L — SIGNIFICANT CHANGE UP (ref 3.5–5.3)
RBC # BLD: 4.73 M/UL — SIGNIFICANT CHANGE UP (ref 3.8–5.2)
RBC # FLD: 13.3 % — SIGNIFICANT CHANGE UP (ref 10.3–14.5)
SODIUM SERPL-SCNC: 139 MMOL/L — SIGNIFICANT CHANGE UP (ref 135–145)
WBC # BLD: 9.8 K/UL — SIGNIFICANT CHANGE UP (ref 3.8–10.5)
WBC # FLD AUTO: 9.8 K/UL — SIGNIFICANT CHANGE UP (ref 3.8–10.5)

## 2022-08-03 RX ORDER — AZITHROMYCIN 500 MG/1
TABLET, FILM COATED ORAL
Refills: 0 | Status: DISCONTINUED | OUTPATIENT
Start: 2022-08-03 | End: 2022-08-04

## 2022-08-03 RX ORDER — AZITHROMYCIN 500 MG/1
500 TABLET, FILM COATED ORAL ONCE
Refills: 0 | Status: COMPLETED | OUTPATIENT
Start: 2022-08-03 | End: 2022-08-03

## 2022-08-03 RX ORDER — AZITHROMYCIN 500 MG/1
500 TABLET, FILM COATED ORAL EVERY 24 HOURS
Refills: 0 | Status: DISCONTINUED | OUTPATIENT
Start: 2022-08-04 | End: 2022-08-04

## 2022-08-03 RX ORDER — CEFTRIAXONE 500 MG/1
1000 INJECTION, POWDER, FOR SOLUTION INTRAMUSCULAR; INTRAVENOUS EVERY 24 HOURS
Refills: 0 | Status: DISCONTINUED | OUTPATIENT
Start: 2022-08-03 | End: 2022-08-04

## 2022-08-03 RX ADMIN — PANTOPRAZOLE SODIUM 40 MILLIGRAM(S): 20 TABLET, DELAYED RELEASE ORAL at 05:07

## 2022-08-03 RX ADMIN — APIXABAN 5 MILLIGRAM(S): 2.5 TABLET, FILM COATED ORAL at 17:43

## 2022-08-03 RX ADMIN — Medication 40 MILLIGRAM(S): at 05:24

## 2022-08-03 RX ADMIN — Medication 1 DROP(S): at 18:02

## 2022-08-03 RX ADMIN — ALBUTEROL 1 PUFF(S): 90 AEROSOL, METERED ORAL at 12:34

## 2022-08-03 RX ADMIN — CARVEDILOL PHOSPHATE 6.25 MILLIGRAM(S): 80 CAPSULE, EXTENDED RELEASE ORAL at 17:43

## 2022-08-03 RX ADMIN — Medication 2: at 12:04

## 2022-08-03 RX ADMIN — Medication 1: at 17:17

## 2022-08-03 RX ADMIN — Medication 1 DROP(S): at 05:08

## 2022-08-03 RX ADMIN — Medication 20 MILLIGRAM(S): at 05:08

## 2022-08-03 RX ADMIN — ALBUTEROL 1 PUFF(S): 90 AEROSOL, METERED ORAL at 21:17

## 2022-08-03 RX ADMIN — MONTELUKAST 10 MILLIGRAM(S): 4 TABLET, CHEWABLE ORAL at 17:44

## 2022-08-03 RX ADMIN — Medication 1: at 21:17

## 2022-08-03 RX ADMIN — AZITHROMYCIN 255 MILLIGRAM(S): 500 TABLET, FILM COATED ORAL at 12:34

## 2022-08-03 RX ADMIN — CARVEDILOL PHOSPHATE 6.25 MILLIGRAM(S): 80 CAPSULE, EXTENDED RELEASE ORAL at 05:08

## 2022-08-03 RX ADMIN — SIMVASTATIN 20 MILLIGRAM(S): 20 TABLET, FILM COATED ORAL at 21:17

## 2022-08-03 RX ADMIN — APIXABAN 5 MILLIGRAM(S): 2.5 TABLET, FILM COATED ORAL at 05:08

## 2022-08-03 RX ADMIN — Medication 5 MILLIGRAM(S): at 21:17

## 2022-08-03 RX ADMIN — Medication 81 MILLIGRAM(S): at 12:34

## 2022-08-03 RX ADMIN — CEFTRIAXONE 100 MILLIGRAM(S): 500 INJECTION, POWDER, FOR SOLUTION INTRAMUSCULAR; INTRAVENOUS at 17:19

## 2022-08-03 RX ADMIN — Medication 325 MILLIGRAM(S): at 17:43

## 2022-08-03 RX ADMIN — Medication 1: at 08:26

## 2022-08-03 NOTE — PROGRESS NOTE ADULT - SUBJECTIVE AND OBJECTIVE BOX
Patient was seen and examined  Patient is a 89y old  Female who presents with a chief complaint of Weakness, SOB (03 Aug 2022 10:37)      INTERVAL HPI/OVERNIGHT EVENTS:  T(C): 36.8 (08-03-22 @ 10:54), Max: 36.9 (08-02-22 @ 20:43)  HR: 83 (08-03-22 @ 10:54) (74 - 83)  BP: 150/86 (08-03-22 @ 10:54) (147/99 - 159/84)  RR: 19 (08-03-22 @ 10:54) (18 - 19)  SpO2: 94% (08-03-22 @ 10:54) (94% - 100%)  Wt(kg): --  I&O's Summary    02 Aug 2022 07:01  -  03 Aug 2022 07:00  --------------------------------------------------------  IN: 0 mL / OUT: 1800 mL / NET: -1800 mL        LABS:                        14.5   9.80  )-----------( 273      ( 03 Aug 2022 07:15 )             45.1     08-03    139  |  93<L>  |  20<H>  ----------------------------<  155<H>  4.3   |  43<H>  |  0.93    Ca    9.8      03 Aug 2022 07:15  Phos  2.8     08-03  Mg     2.5     08-03    TPro  7.6  /  Alb  3.2<L>  /  TBili  0.6  /  DBili  x   /  AST  12  /  ALT  27  /  AlkPhos  134<H>  08-02        CAPILLARY BLOOD GLUCOSE      POCT Blood Glucose.: 229 mg/dL (03 Aug 2022 11:43)  POCT Blood Glucose.: 174 mg/dL (03 Aug 2022 07:41)  POCT Blood Glucose.: 135 mg/dL (02 Aug 2022 21:14)  POCT Blood Glucose.: 154 mg/dL (02 Aug 2022 16:33)              MEDICATIONS  (STANDING):  ALBUTerol    90 MICROgram(s) HFA Inhaler 1 Puff(s) Inhalation two times a day  apixaban 5 milliGRAM(s) Oral two times a day  artificial  tears Solution 1 Drop(s) Both EYES two times a day  aspirin  chewable 81 milliGRAM(s) Oral daily  azithromycin  IVPB      azithromycin  IVPB 500 milliGRAM(s) IV Intermittent every 24 hours  carvedilol 6.25 milliGRAM(s) Oral every 12 hours  ferrous    sulfate 325 milliGRAM(s) Oral daily  furosemide    Tablet 20 milliGRAM(s) Oral daily  insulin lispro (ADMELOG) corrective regimen sliding scale   SubCutaneous Before meals and at bedtime  melatonin 5 milliGRAM(s) Oral at bedtime  methylPREDNISolone sodium succinate Injectable 40 milliGRAM(s) IV Push daily  montelukast 10 milliGRAM(s) Oral daily  pantoprazole    Tablet 40 milliGRAM(s) Oral before breakfast  simvastatin 20 milliGRAM(s) Oral at bedtime    MEDICATIONS  (PRN):      RADIOLOGY & ADDITIONAL TESTS:    Imaging Personally Reviewed:  [ ] YES  [ ] NO    REVIEW OF SYSTEMS:  CONSTITUTIONAL: No fever, weight loss, or fatigue  EYES: No eye pain, visual disturbances, or discharge  ENMT:  No difficulty hearing, tinnitus, vertigo; No sinus or throat pain  NECK: No pain or stiffness  BREASTS: No pain, masses, or nipple discharge  RESPIRATORY: No cough, wheezing, chills or hemoptysis; No shortness of breath  CARDIOVASCULAR: No chest pain, palpitations, dizziness, or leg swelling  GASTROINTESTINAL: No abdominal or epigastric pain. No nausea, vomiting, or hematemesis; No diarrhea or constipation. No melena or hematochezia.  GENITOURINARY: No dysuria, frequency, hematuria, or incontinence  NEUROLOGICAL: No headaches, memory loss, loss of strength, numbness, or tremors  SKIN: No itching, burning, rashes, or lesions   LYMPH NODES: No enlarged glands  ENDOCRINE: No heat or cold intolerance; No hair loss  MUSCULOSKELETAL: No joint pain or swelling; No muscle, back, or extremity pain  PSYCHIATRIC: No depression, anxiety, mood swings, or difficulty sleeping  HEME/LYMPH: No easy bruising, or bleeding gums  ALLERY AND IMMUNOLOGIC: No hives or eczema      Consultant(s) Notes Reviewed:  [ ] YES  [ ] NO    PHYSICAL EXAM:  GENERAL: NAD, well-groomed, well-developed  HEAD:  Atraumatic, Normocephalic  EYES: EOMI, PERRLA, conjunctiva and sclera clear  ENMT: No tonsillar erythema, exudates, or enlargement; Moist mucous membranes, Good dentition, No lesions  NECK: Supple, No JVD, Normal thyroid  NERVOUS SYSTEM:  Alert & Oriented X3, Good concentration; Motor Strength 5/5 B/L upper and lower extremities; DTRs 2+ intact and symmetric  CHEST/LUNG: Clear to percussion bilaterally; No rales, rhonchi, wheezing, or rubs  HEART: Regular rate and rhythm; No murmurs, rubs, or gallops  ABDOMEN: Soft, Nontender, Nondistended; Bowel sounds present  EXTREMITIES:  2+ Peripheral Pulses, No clubbing, cyanosis, or edema  LYMPH: No lymphadenopathy noted  SKIN: No rashes or lesions    Care Discussed with Consultants/Other Providers [ x] YES  [ ] NO

## 2022-08-03 NOTE — PROGRESS NOTE ADULT - SUBJECTIVE AND OBJECTIVE BOX
Patient is a 89y old  Female who presents with a chief complaint of Weakness, SOB (02 Aug 2022 16:26)    Patient is awake, alert, laying comfortable in bed in no acute distress in RA.     INTERVAL HPI/OVERNIGHT EVENTS:      VITAL SIGNS:  T(F): 97.5 (08-03-22 @ 07:34)  HR: 74 (08-03-22 @ 07:34)  BP: 155/94 (08-03-22 @ 07:34)  RR: 19 (08-03-22 @ 07:34)  SpO2: 95% (08-03-22 @ 07:34)  Wt(kg): --  I&O's Detail    02 Aug 2022 07:01  -  03 Aug 2022 07:00  --------------------------------------------------------  IN:  Total IN: 0 mL    OUT:    Voided (mL): 1800 mL  Total OUT: 1800 mL    Total NET: -1800 mL              REVIEW OF SYSTEMS:    CONSTITUTIONAL:  No fevers, chills, sweats    HEENT:  Eyes:  No diplopia or blurred vision. ENT:  No earache, sore throat or runny nose.    CARDIOVASCULAR:  No pressure, squeezing, tightness, or heaviness about the chest; no palpitations.    RESPIRATORY:  Per HPI    GASTROINTESTINAL:  No abdominal pain, nausea, vomiting or diarrhea.    GENITOURINARY:  No dysuria, frequency or urgency.    NEUROLOGIC:  No paresthesias, fasciculations, seizures or weakness.    PSYCHIATRIC:  No disorder of thought or mood.      PHYSICAL EXAM:    Constitutional: Well developed and nourished  Eyes:Perrla  ENMT: normal  Neck:supple  Respiratory: good air entry  Cardiovascular: S1 S2 regular  Gastrointestinal: Soft, Non tender  Extremities: edema +/+++  Vascular:normal  Neurological:Awake, alert,Ox3  Musculoskeletal:Normal      MEDICATIONS  (STANDING):  ALBUTerol    90 MICROgram(s) HFA Inhaler 1 Puff(s) Inhalation two times a day  apixaban 5 milliGRAM(s) Oral two times a day  artificial  tears Solution 1 Drop(s) Both EYES two times a day  aspirin  chewable 81 milliGRAM(s) Oral daily  azithromycin  IVPB      azithromycin  IVPB 500 milliGRAM(s) IV Intermittent every 24 hours  carvedilol 6.25 milliGRAM(s) Oral every 12 hours  ferrous    sulfate 325 milliGRAM(s) Oral daily  furosemide    Tablet 20 milliGRAM(s) Oral daily  insulin lispro (ADMELOG) corrective regimen sliding scale   SubCutaneous Before meals and at bedtime  melatonin 5 milliGRAM(s) Oral at bedtime  methylPREDNISolone sodium succinate Injectable 40 milliGRAM(s) IV Push daily  montelukast 10 milliGRAM(s) Oral daily  pantoprazole    Tablet 40 milliGRAM(s) Oral before breakfast  simvastatin 20 milliGRAM(s) Oral at bedtime    MEDICATIONS  (PRN):      Allergies    losartan (Angioedema)    Intolerances    Chicken (Stomach Upset)      LABS:                        14.5   9.80  )-----------( 273      ( 03 Aug 2022 07:15 )             45.1     08-03    139  |  93<L>  |  20<H>  ----------------------------<  155<H>  4.3   |  43<H>  |  0.93    Ca    9.8      03 Aug 2022 07:15  Phos  2.8     08-03  Mg     2.5     08-03    TPro  7.6  /  Alb  3.2<L>  /  TBili  0.6  /  DBili  x   /  AST  12  /  ALT  27  /  AlkPhos  134<H>  08-02              CAPILLARY BLOOD GLUCOSE      POCT Blood Glucose.: 174 mg/dL (03 Aug 2022 07:41)  POCT Blood Glucose.: 135 mg/dL (02 Aug 2022 21:14)  POCT Blood Glucose.: 154 mg/dL (02 Aug 2022 16:33)  POCT Blood Glucose.: 221 mg/dL (02 Aug 2022 11:36)    pro-bnp 2324 07-28 @ 15:30     d-dimer --  07-28 @ 15:30      RADIOLOGY & ADDITIONAL TESTS:    CXR:    Ct scan chest:    ekg;    echo: Patient is a 89y old  Female who presents with a chief complaint of Weakness, SOB (02 Aug 2022 16:26)    Patient is awake, alert, laying comfortable in bed in no acute distress. Currently on RA.     INTERVAL HPI/OVERNIGHT EVENTS:      VITAL SIGNS:  T(F): 97.5 (08-03-22 @ 07:34)  HR: 74 (08-03-22 @ 07:34)  BP: 155/94 (08-03-22 @ 07:34)  RR: 19 (08-03-22 @ 07:34)  SpO2: 95% (08-03-22 @ 07:34)  Wt(kg): --  I&O's Detail    02 Aug 2022 07:01  -  03 Aug 2022 07:00  --------------------------------------------------------  IN:  Total IN: 0 mL    OUT:    Voided (mL): 1800 mL  Total OUT: 1800 mL    Total NET: -1800 mL              REVIEW OF SYSTEMS:    CONSTITUTIONAL:  No fevers, chills, sweats    HEENT:  Eyes:  No diplopia or blurred vision. ENT:  No earache, sore throat or runny nose.    CARDIOVASCULAR:  No pressure, squeezing, tightness, or heaviness about the chest; no palpitations.    RESPIRATORY:  Per HPI    GASTROINTESTINAL:  No abdominal pain, nausea, vomiting or diarrhea.    GENITOURINARY:  No dysuria, frequency or urgency.    NEUROLOGIC:  No paresthesias, fasciculations, seizures or weakness.    PSYCHIATRIC:  No disorder of thought or mood.      PHYSICAL EXAM:    Constitutional: Well developed and nourished  Eyes:Perrla  ENMT: normal  Neck:supple  Respiratory: good air entry  Cardiovascular: S1 S2 regular  Gastrointestinal: Soft, Non tender  Extremities: edema +/+++  Vascular:normal  Neurological:Awake, alert,Ox3  Musculoskeletal:Normal      MEDICATIONS  (STANDING):  ALBUTerol    90 MICROgram(s) HFA Inhaler 1 Puff(s) Inhalation two times a day  apixaban 5 milliGRAM(s) Oral two times a day  artificial  tears Solution 1 Drop(s) Both EYES two times a day  aspirin  chewable 81 milliGRAM(s) Oral daily  azithromycin  IVPB      azithromycin  IVPB 500 milliGRAM(s) IV Intermittent every 24 hours  carvedilol 6.25 milliGRAM(s) Oral every 12 hours  ferrous    sulfate 325 milliGRAM(s) Oral daily  furosemide    Tablet 20 milliGRAM(s) Oral daily  insulin lispro (ADMELOG) corrective regimen sliding scale   SubCutaneous Before meals and at bedtime  melatonin 5 milliGRAM(s) Oral at bedtime  methylPREDNISolone sodium succinate Injectable 40 milliGRAM(s) IV Push daily  montelukast 10 milliGRAM(s) Oral daily  pantoprazole    Tablet 40 milliGRAM(s) Oral before breakfast  simvastatin 20 milliGRAM(s) Oral at bedtime    MEDICATIONS  (PRN):      Allergies    losartan (Angioedema)    Intolerances    Chicken (Stomach Upset)      LABS:                        14.5   9.80  )-----------( 273      ( 03 Aug 2022 07:15 )             45.1     08-03    139  |  93<L>  |  20<H>  ----------------------------<  155<H>  4.3   |  43<H>  |  0.93    Ca    9.8      03 Aug 2022 07:15  Phos  2.8     08-03  Mg     2.5     08-03    TPro  7.6  /  Alb  3.2<L>  /  TBili  0.6  /  DBili  x   /  AST  12  /  ALT  27  /  AlkPhos  134<H>  08-02              CAPILLARY BLOOD GLUCOSE      POCT Blood Glucose.: 174 mg/dL (03 Aug 2022 07:41)  POCT Blood Glucose.: 135 mg/dL (02 Aug 2022 21:14)  POCT Blood Glucose.: 154 mg/dL (02 Aug 2022 16:33)  POCT Blood Glucose.: 221 mg/dL (02 Aug 2022 11:36)    pro-bnp 2324 07-28 @ 15:30     d-dimer --  07-28 @ 15:30      RADIOLOGY & ADDITIONAL TESTS:    CXR:  < from: Xray Chest 1 View- PORTABLE-Urgent (07.28.22 @ 15:55) >  IMPRESSION:  1. Ill-defined airspace opacities bilaterally which may be infectious or   related to CHF and or not present previously.  2. Cardiomegaly with pulmonary venous engorgement.  3. Elevated right hemidiaphragm, unchanged.      < end of copied text >    Ct scan chest:    ekg;    echo:

## 2022-08-03 NOTE — PROGRESS NOTE ADULT - ASSESSMENT
Patient is a 89y old  Female from home, ambulates using walker/wheelchair, lives with grand-daughter, with PMHx of COPD (not on home oxygen), Afib on Eliquis, HFpEF (Echo Nov 2021 EF 55-60%), prior DVT, DM, presents to the ER for evaluation of generalized weakness for 2 days. She went to see her PMD and found to be hypoxic at her PMD, Dr. Bianchi's office,  to 77%. She endorses leg swelling, but states that her leg swelling has been on and off for many years. On admission, she found to have no fever, no Leukocytosis but CXR shows  Ill-defined airspace opacities bilaterally which may be infectious or related to CHF and or not present previously. She has started on Ceftriaxone and Azithromycin, and the ID consult requested to assist with further evaluation and antibiotic management.     # Pneumonia  - on CXR- MRSA PCR and Legionella urine AG is negative  # CHF exacerbation- repeat CXR pending    would recommend:    1. OOB to chair   2. Diuresis as per Primary/cardiology team  3. Continue Ceftriaxone and Azithromycin until 8/3/22, may change to oral Augmentin 875 mg q 12hours on discharge X 1 more day  4.. Keep Both LE elevated  5. Supplemental oxygenation and bronchodilator as needed    d/w Family at the be side and Nursing staff     Attending Attestation:  Time-based billing (NON-critical care).   Spent more than 35 minutes on total encounter, more than 50 % of the visit was spent counseling and/or coordinating care by the Attending physician.  The necessity of the time spent during the encounter on this date of service is due to complexity of:    - Pneumonia  - CHF exacerbation  - Hypoxia- on supplemental oxygenation   Patient is a 89y old  Female from home, ambulates using walker/wheelchair, lives with grand-daughter, with PMHx of COPD (not on home oxygen), Afib on Eliquis, HFpEF (Echo Nov 2021 EF 55-60%), prior DVT, DM, presents to the ER for evaluation of generalized weakness for 2 days. She went to see her PMD and found to be hypoxic at her PMD, Dr. Bianchi's office,  to 77%. She endorses leg swelling, but states that her leg swelling has been on and off for many years. On admission, she found to have no fever, no Leukocytosis but CXR shows  Ill-defined airspace opacities bilaterally which may be infectious or related to CHF and or not present previously. She has started on Ceftriaxone and Azithromycin, and the ID consult requested to assist with further evaluation and antibiotic management.     # Pneumonia  - on CXR- MRSA PCR and Legionella urine AG is negative  # CHF exacerbation- repeat CXR pending    would recommend:    1. PT/OOB to chair   2. Diuresis as per Primary/cardiology team  3. Discontinue Ceftriaxone and Azithromycin after Today's doses  4.. Keep Both LE elevated    d/w Family at the bed side    Attending Attestation:  Time-based billing (NON-critical care).   Spent more than 35 minutes on total encounter, more than 50 % of the visit was spent counseling and/or coordinating care by the Attending physician.  The necessity of the time spent during the encounter on this date of service is due to complexity of:    - Pneumonia  - CHF exacerbation  - Hypoxia- on supplemental oxygenation

## 2022-08-03 NOTE — PROGRESS NOTE ADULT - PROBLEM SELECTOR PLAN 1
Pt p/w hypoxia to 77% at PMD's office, noted to have worsening lower extremity swelling, difficulty breathing  Possible due to medication non-compliance, lasix dose recently reduced outpatient     - EKG 7/28 - afib  - Trop: 197.5 >178, downtrending   - BNP: 2324 (from 750 last admission in Nov)  - pro calcitonin 0.05 on 7/30   - titrate oxygen off in reference to COPD target of 88-92 % , prevent over oxygenation  - Xray : Ill-defined airspace opacities bilaterally which may be infectious or related to CHF and or not present previously. Cardiomegaly with pulmonary venous engorgement.  - CTA 7/27 = small R sided pleural effusion, no evidence of PE     - Lasix dose decreased from 40 IV to 20 mg PO daily (BUN trending up) on 8/1   - strict I&Os, daily weights  - lower suspicion for pneumonia - pt. also on ceftriaxone, and azithromycin, today is Day 6 on 8/2  - As per ID: continue ABX until 8/3, may change to oral Augmentin 875 mg q 12hours on discharge   - Dr. Denton  cardiology on board   - ID on board  - PO Abx on 8/4  - patient completed

## 2022-08-03 NOTE — PROGRESS NOTE ADULT - SUBJECTIVE AND OBJECTIVE BOX
PATIENT SEEN AND EXAMINED ON :- 8/3/22  DATE OF SERVICE:  8/3/22           Interim events noted,Labs ,Radiological studies and Cardiology tests reviewed .       HOSPITAL COURSE: HPI:  Pt is a 89 yo Female, from home, ambulates using walker/wheelchair, lives with grand-daughter, with PMHx of COPD (not on home oxygen), Afib on Eliquis, HFpEF (Echo Nov 2021 EF 55-60%), prior DVT, DM, presenting with generalized weakness for 2 days. Pt is a poor historian--She attributed her weakness to the summer heat. Says her weakness worsened this morning, her symptoms worsened, which prompted her to go to her PMD. She was found to be hypoxic at her PMD Dr. Bianchi to 77%. She endorses leg swelling, but states that her leg swelling has been on and off for many years. Grand-daughter at bedside endorses that medications are usually given by patient's daughter to the patient, pt may have missed a few doses of Lasix. Endorses that family tries their best to have a salt restricted/fluid restricted diet. Papers from PMD office shows that pt is on Lasix 20 mg daily, was on Lasix 40 mg last month--unsure why dose was changed, family and patient unsure of the dosage as well. Pt endorses no chest pain, cough, sputum production, palpitations, cough, fever, abdominal pain, constipation, diarrhea, nausea, vomiting. She reports no increase in use of her nebulizer in the fast few days. No recent sick contacts, or travel.     In the ED, Vitals were:  Temp 98.4 F, 118/79, 77 HR, 98% on 2L (rechecked her O2 saturation, 85% on RA, 91% on 3L)  EKG Afib, Trop 197.5  Cxray suggestive of fluid overload (28 Jul 2022 19:43)      INTERIM EVENTS:Patient seen at bedside ,interim events noted.      PMH -reviewed admission note, no change since admission  HEART FAILURE: Acute[ ]Chronic[ ] Systolic[ ] Diastolic[ ] Combined Systolic and Diastolic[ ]  CAD[ ] CABG[ ] PCI[ ]  DEVICES[ ] PPM[ ] ICD[ ] ILR[ ]  ATRIAL FIBRILLATION[ ] Paroxysmal[ ] Permanent[ ] CHADS2-[  ]  JACOBO[ ] CKD1[ ] CKD2[ ] CKD3[ ] CKD4[ ] ESRD[ ]  COPD[ ] HTN[ ]   DM[ ] Type1[ ] Type 2[ ]   CVA[ ] Paresis[ ]    AMBULATION: Assisted[ ] Cane/walker[ ] Independent[ ]    MEDICATIONS  (STANDING):  ALBUTerol    90 MICROgram(s) HFA Inhaler 1 Puff(s) Inhalation two times a day  apixaban 5 milliGRAM(s) Oral two times a day  artificial  tears Solution 1 Drop(s) Both EYES two times a day  aspirin  chewable 81 milliGRAM(s) Oral daily  azithromycin  IVPB      carvedilol 6.25 milliGRAM(s) Oral every 12 hours  cefTRIAXone   IVPB 1000 milliGRAM(s) IV Intermittent every 24 hours  ferrous    sulfate 325 milliGRAM(s) Oral daily  furosemide    Tablet 20 milliGRAM(s) Oral daily  insulin lispro (ADMELOG) corrective regimen sliding scale   SubCutaneous Before meals and at bedtime  melatonin 5 milliGRAM(s) Oral at bedtime  methylPREDNISolone sodium succinate Injectable 40 milliGRAM(s) IV Push daily  montelukast 10 milliGRAM(s) Oral daily  pantoprazole    Tablet 40 milliGRAM(s) Oral before breakfast  simvastatin 20 milliGRAM(s) Oral at bedtime    MEDICATIONS  (PRN):            REVIEW OF SYSTEMS:  Constitutional: [ ] fever, [ ]weight loss,  [ ]fatigue [ ]weight gain  Eyes: [ ] visual changes  Respiratory: [ ]shortness of breath;  [ ] cough, [ ]wheezing, [ ]chills, [ ]hemoptysis  Cardiovascular: [ ] chest pain, [ ]palpitations, [ ]dizziness,  [ ]leg swelling[ ]orthopnea[ ]PND  Gastrointestinal: [ ] abdominal pain, [ ]nausea, [ ]vomiting,  [ ]diarrhea [ ]Constipation [ ]Melena  Genitourinary: [ ] dysuria, [ ] hematuria [ ][place]  Neurologic: [ ] headaches [ ] tremors[ ]weakness [ ]Paralysis Right[ ] Left[ ]  Skin: [ ] itching, [ ]burning, [ ] rashes  Endocrine: [ ] heat or cold intolerance  Musculoskeletal: [ ] joint pain or swelling; [ ] muscle, back, or extremity pain  Psychiatric: [ ] depression, [ ]anxiety, [ ]mood swings, or [ ]difficulty sleeping  Hematologic: [ ] easy bruising, [ ] bleeding gums    [ ] All remaining systems negative except as per above.   [ ]Unable to obtain.  [x] No change in ROS since admission      Vital Signs Last 24 Hrs  T(C): 36.8 (03 Aug 2022 20:28), Max: 36.9 (03 Aug 2022 00:06)  T(F): 98.2 (03 Aug 2022 20:28), Max: 98.5 (03 Aug 2022 00:06)  HR: 84 (03 Aug 2022 20:28) (74 - 84)  BP: 127/75 (03 Aug 2022 20:28) (126/89 - 159/84)  BP(mean): --  RR: 19 (03 Aug 2022 20:28) (18 - 19)  SpO2: 100% (03 Aug 2022 20:28) (93% - 100%)    Parameters below as of 03 Aug 2022 20:28  Patient On (Oxygen Delivery Method): room air      I&O's Summary    02 Aug 2022 07:01  -  03 Aug 2022 07:00  --------------------------------------------------------  IN: 0 mL / OUT: 1800 mL / NET: -1800 mL        PHYSICAL EXAM:  General: No acute distress BMI-  HEENT: EOMI, PERRL  Neck: Supple, [ ] JVD  Lungs: Equal air entry bilaterally; [ ] rales [ ] wheezing [ ] rhonchi  Heart: Regular rate and rhythm; [x ] murmur   2/6 [ x] systolic [ ] diastolic [ ] radiation[ ] rubs [ ]  gallops  Abdomen: Nontender, bowel sounds present  Extremities: No clubbing, cyanosis, [ ] edema [ ]Pulses  equal and intact  Nervous system:  Alert & Oriented X3, no focal deficits  Psychiatric: Normal affect  Skin: No rashes or lesions    LABS:  08-03    139  |  93<L>  |  20<H>  ----------------------------<  155<H>  4.3   |  43<H>  |  0.93    Ca    9.8      03 Aug 2022 07:15  Phos  2.8     08-03  Mg     2.5     08-03    TPro  7.6  /  Alb  3.2<L>  /  TBili  0.6  /  DBili  x   /  AST  12  /  ALT  27  /  AlkPhos  134<H>  08-02    Creatinine Trend: 0.93<--, 1.03<--, 0.88<--, 0.97<--, 0.94<--, 0.98<--                        14.5   9.80  )-----------( 273      ( 03 Aug 2022 07:15 )             45.1

## 2022-08-03 NOTE — PROGRESS NOTE ADULT - SUBJECTIVE AND OBJECTIVE BOX
PGY-1 Progress Note discussed with attending    PAGER #: [[phone]] TILL 5:00 PM  PLEASE CONTACT ON CALL TEAM:  - On Call Team (Please refer to Cassy) FROM 5:00 PM - 8:30PM  - Nightfloat Team FROM 8:30 -7:30 AM    CHIEF COMPLAINT & BRIEF HOSPITAL COURSE:  Patient is a 89y old  Female who presents with a chief complaint of Weakness, SOB (02 Aug 2022 15:49)    INTERVAL HPI/OVERNIGHT EVENTS:   Patient was examined while she was lying in bed, Aox3, responding appropriately to questions, in NAD. No acute overnight events. Pt denied any chest pain, shortness of breath, nausea, vomiting or abdominal pain. She has normal bowel and bladder movements, and denies any other acute complaints. Upon examination patient was not on oxygen and not in distress, or any SOB, patient was titrated off the oxygen yesterday afternoon and was able to maintain her oxygen level at the target level.     REVIEW OF SYSTEMS:  CONSTITUTIONAL: No fever, weight loss, or fatigue, improved appetite   RESPIRATORY: No cough, wheezing, chills or hemoptysis; No shortness of breath,   CARDIOVASCULAR: No chest pain, palpitations, dizziness, has leg swelling improved compared to 8/2, able to sit and move around without SOB  GASTROINTESTINAL: No abdominal pain. No nausea, vomiting, or hematemesis; No diarrhea , has constipation since admission   GENITOURINARY: No dysuria or hematuria, urinary frequency  NEUROLOGICAL: No headaches, memory loss, loss of strength, numbness, or tremors  SKIN: No itching, burning, rashes, or lesions       Vital Signs Last 24 Hrs  T(C): 36.8 (03 Aug 2022 10:54), Max: 36.9 (02 Aug 2022 20:43)  T(F): 98.3 (03 Aug 2022 10:54), Max: 98.5 (03 Aug 2022 00:06)  HR: 83 (03 Aug 2022 10:54) (74 - 83)  BP: 150/86 (03 Aug 2022 10:54) (147/99 - 159/84)  BP(mean): --  RR: 19 (03 Aug 2022 10:54) (18 - 19)  SpO2: 94% (03 Aug 2022 10:54) (94% - 100%)    Parameters below as of 03 Aug 2022 10:54  Patient On (Oxygen Delivery Method): room air        PHYSICAL EXAMINATION:  GENERAL: NAD, obese   HEAD:  Atraumatic, Normocephalic  EYES:  conjunctiva and sclera clear  NECK: Supple, No JVD, Normal thyroid  CHEST/LUNG: Clear to auscultation. Clear to percussion bilaterally; No rales, rhonchi, wheezing, or rubs  HEART: Regular rate and rhythm; No murmurs, rubs, or gallops, +1 bilateral pitting edema improved compared to 8/2  GASTROINTESTINAL: Abdomen soft , No tenderness, bowel sound present   NEUROLOGICAL: 5/5 Strength in Upper and Lower extremities.                           14.5   9.80  )-----------( 273      ( 03 Aug 2022 07:15 )             45.1     08-03    139  |  93<L>  |  20<H>  ----------------------------<  155<H>  4.3   |  43<H>  |  0.93    Ca    9.8      03 Aug 2022 07:15  Phos  2.8     08-03  Mg     2.5     08-03    TPro  7.6  /  Alb  3.2<L>  /  TBili  0.6  /  DBili  x   /  AST  12  /  ALT  27  /  AlkPhos  134<H>  08-02    LIVER FUNCTIONS - ( 02 Aug 2022 10:48 )  Alb: 3.2 g/dL / Pro: 7.6 g/dL / ALK PHOS: 134 U/L / ALT: 27 U/L DA / AST: 12 U/L / GGT: x

## 2022-08-03 NOTE — PROGRESS NOTE ADULT - SUBJECTIVE AND OBJECTIVE BOX
Patient is seen and examined at the bed side, is afebrile. She is awake and  fully alert today, doing good, OFF supplemental oxygenation and saturating well in Room air.      REVIEW OF SYSTEMS: All other review systems are negative       ALLERGIES: losartan (Angioedema)      Vital Signs Last 24 Hrs  T(C): 36.8 (03 Aug 2022 10:54), Max: 36.9 (02 Aug 2022 20:43)  T(F): 98.3 (03 Aug 2022 10:54), Max: 98.5 (03 Aug 2022 00:06)  HR: 79 (03 Aug 2022 12:33) (74 - 83)  BP: 155/84 (03 Aug 2022 12:33) (147/99 - 159/84)  BP(mean): --  RR: 19 (03 Aug 2022 10:54) (18 - 19)  SpO2: 93% (03 Aug 2022 12:33) (93% - 100%)    Parameters below as of 03 Aug 2022 10:54  Patient On (Oxygen Delivery Method): room air      PHYSICAL EXAM:  GENERAL: Not in distress, off oxygen   CHEST/LUNG:  Not using accessory muscles   HEART: s1 and s2 present  ABDOMEN:  Nontender and  Nondistended  EXTREMITIES: B/L pedal  edema almost resolved  CNS: Awake and alert      LABS:                        14.5   9.80  )-----------( 273      ( 03 Aug 2022 07:15 )             45.1                           14.3   7.10  )-----------( 260      ( 02 Aug 2022 10:48 )             46.0       08-03    139  |  93<L>  |  20<H>  ----------------------------<  155<H>  4.3   |  43<H>  |  0.93    Ca    9.8      03 Aug 2022 07:15  Phos  2.8     08-03  Mg     2.5     08-03    TPro  7.6  /  Alb  3.2<L>  /  TBili  0.6  /  DBili  x   /  AST  12  /  ALT  27  /  AlkPhos  134<H>  08-02    08-02    138  |  91<L>  |  22<H>  ----------------------------<  235<H>  4.2   |  41<H>  |  1.03    Ca    9.2      02 Aug 2022 10:48  Phos  2.8     08-  Mg     2.3     -    TPro  7.6  /  Alb  3.2<L>  /  TBili  0.6  /  DBili  x   /  AST  12  /  ALT  27  /  AlkPhos  134<H>  08        CAPILLARY BLOOD GLUCOSE  POCT Blood Glucose.: 192 mg/dL (2022 11:37)  POCT Blood Glucose.: 189 mg/dL (2022 07:37)  POCT Blood Glucose.: 205 mg/dL (2022 00:05)        Urinalysis Basic - ( 2022 03:10 )  Color: Yellow / Appearance: Slightly Turbid / S.010 / pH: x  Gluc: x / Ketone: Negative  / Bili: Negative / Urobili: Negative   Blood: x / Protein: 15 / Nitrite: Negative   Leuk Esterase: Small / RBC: 2-5 /HPF / WBC 3-5 /HPF   Sq Epi: x / Non Sq Epi: Few /HPF / Bacteria: Moderate /HPF      MEDICATIONS  (STANDING):    ALBUTerol    90 MICROgram(s) HFA Inhaler 1 Puff(s) Inhalation two times a day  apixaban 5 milliGRAM(s) Oral two times a day  artificial  tears Solution 1 Drop(s) Both EYES two times a day  aspirin  chewable 81 milliGRAM(s) Oral daily  azithromycin  IVPB      carvedilol 6.25 milliGRAM(s) Oral every 12 hours  cefTRIAXone   IVPB 1000 milliGRAM(s) IV Intermittent every 24 hours  ferrous    sulfate 325 milliGRAM(s) Oral daily  furosemide    Tablet 20 milliGRAM(s) Oral daily  insulin lispro (ADMELOG) corrective regimen sliding scale   SubCutaneous Before meals and at bedtime  melatonin 5 milliGRAM(s) Oral at bedtime  methylPREDNISolone sodium succinate Injectable 40 milliGRAM(s) IV Push daily  montelukast 10 milliGRAM(s) Oral daily  pantoprazole    Tablet 40 milliGRAM(s) Oral before breakfast  simvastatin 20 milliGRAM(s) Oral at bedtime        RADIOLOGY & ADDITIONAL TESTS:    22: CT Angio Chest PE Protocol w/ IV Cont (22 @ 22:52) Limited evaluation for segmental and subsegmental pulmonary embolism. No   main, left right, or lobar pulmonary embolism.      22: Xray Chest 1 View- PORTABLE-Urgent (22 @ 15:55) >  1. Ill-defined airspace opacities bilaterally which may be infectious or related to CHF and or not present previously.  2. Cardiomegaly with pulmonary venous engorgement.  3. Elevated right hemidiaphragm, unchanged.      MICROBIOLOGY DATA:    Legionella pneumophila Antigen, Urine (22 @ 21:40)   Legionella Antigen, Urine: Negative    MRSA/MSSA PCR (22 @ 17:50)   MRSA PCR Result.: NotDetec:     Respiratory Viral Panel with COVID-19 by PATRICK (22 @ 15:30)   Rapid RVP Result: NotDetec   SARS-CoV-2: NotDetec:             Patient is seen and examined at the bed side, is afebrile. She mentioned feeling good, the family at the bed side.       REVIEW OF SYSTEMS: All other review systems are negative       ALLERGIES: losartan (Angioedema)      Vital Signs Last 24 Hrs  T(C): 36.8 (03 Aug 2022 10:54), Max: 36.9 (02 Aug 2022 20:43)  T(F): 98.3 (03 Aug 2022 10:54), Max: 98.5 (03 Aug 2022 00:06)  HR: 79 (03 Aug 2022 12:33) (74 - 83)  BP: 155/84 (03 Aug 2022 12:33) (147/99 - 159/84)  BP(mean): --  RR: 19 (03 Aug 2022 10:54) (18 - 19)  SpO2: 93% (03 Aug 2022 12:33) (93% - 100%)    Parameters below as of 03 Aug 2022 10:54  Patient On (Oxygen Delivery Method): room air      PHYSICAL EXAM:  GENERAL: Not in distress  CHEST/LUNG:  Not using accessory muscles   HEART: s1 and s2 present  ABDOMEN:  Nontender and  Nondistended  EXTREMITIES: B/L pedal  edema almost resolved  CNS: Awake and alert      LABS:                        14.5   9.80  )-----------( 273      ( 03 Aug 2022 07:15 )             45.1                           14.3   7.10  )-----------( 260      ( 02 Aug 2022 10:48 )             46.0       08-03    139  |  93<L>  |  20<H>  ----------------------------<  155<H>  4.3   |  43<H>  |  0.93    Ca    9.8      03 Aug 2022 07:15  Phos  2.8     08-03  Mg     2.5     08-03    TPro  7.6  /  Alb  3.2<L>  /  TBili  0.6  /  DBili  x   /  AST  12  /  ALT  27  /  AlkPhos  134<H>      08-02    138  |  91<L>  |  22<H>  ----------------------------<  235<H>  4.2   |  41<H>  |  1.03    Ca    9.2      02 Aug 2022 10:48  Phos  2.8     08-  Mg     2.3     08-    TPro  7.6  /  Alb  3.2<L>  /  TBili  0.6  /  DBili  x   /  AST  12  /  ALT  27  /  AlkPhos  134<H>          CAPILLARY BLOOD GLUCOSE  POCT Blood Glucose.: 192 mg/dL (2022 11:37)  POCT Blood Glucose.: 189 mg/dL (2022 07:37)  POCT Blood Glucose.: 205 mg/dL (2022 00:05)        Urinalysis Basic - ( 2022 03:10 )  Color: Yellow / Appearance: Slightly Turbid / S.010 / pH: x  Gluc: x / Ketone: Negative  / Bili: Negative / Urobili: Negative   Blood: x / Protein: 15 / Nitrite: Negative   Leuk Esterase: Small / RBC: 2-5 /HPF / WBC 3-5 /HPF   Sq Epi: x / Non Sq Epi: Few /HPF / Bacteria: Moderate /HPF      MEDICATIONS  (STANDING):    ALBUTerol    90 MICROgram(s) HFA Inhaler 1 Puff(s) Inhalation two times a day  apixaban 5 milliGRAM(s) Oral two times a day  artificial  tears Solution 1 Drop(s) Both EYES two times a day  aspirin  chewable 81 milliGRAM(s) Oral daily  azithromycin  IVPB      carvedilol 6.25 milliGRAM(s) Oral every 12 hours  cefTRIAXone   IVPB 1000 milliGRAM(s) IV Intermittent every 24 hours  ferrous    sulfate 325 milliGRAM(s) Oral daily  furosemide    Tablet 20 milliGRAM(s) Oral daily  insulin lispro (ADMELOG) corrective regimen sliding scale   SubCutaneous Before meals and at bedtime  melatonin 5 milliGRAM(s) Oral at bedtime  methylPREDNISolone sodium succinate Injectable 40 milliGRAM(s) IV Push daily  montelukast 10 milliGRAM(s) Oral daily  pantoprazole    Tablet 40 milliGRAM(s) Oral before breakfast  simvastatin 20 milliGRAM(s) Oral at bedtime        RADIOLOGY & ADDITIONAL TESTS:    22: CT Angio Chest PE Protocol w/ IV Cont (22 @ 22:52) Limited evaluation for segmental and subsegmental pulmonary embolism. No   main, left right, or lobar pulmonary embolism.      22: Xray Chest 1 View- PORTABLE-Urgent (22 @ 15:55) >  1. Ill-defined airspace opacities bilaterally which may be infectious or related to CHF and or not present previously.  2. Cardiomegaly with pulmonary venous engorgement.  3. Elevated right hemidiaphragm, unchanged.      MICROBIOLOGY DATA:    Legionella pneumophila Antigen, Urine (22 @ 21:40)   Legionella Antigen, Urine: Negative    MRSA/MSSA PCR (22 @ 17:50)   MRSA PCR Result.: NotDetec:     Respiratory Viral Panel with COVID-19 by PATRICK (22 @ 15:30)   Rapid RVP Result: NotDetec   SARS-CoV-2: NotDetec:

## 2022-08-04 ENCOUNTER — TRANSCRIPTION ENCOUNTER (OUTPATIENT)
Age: 87
End: 2022-08-04

## 2022-08-04 VITALS
OXYGEN SATURATION: 100 % | HEART RATE: 79 BPM | RESPIRATION RATE: 18 BRPM | DIASTOLIC BLOOD PRESSURE: 91 MMHG | SYSTOLIC BLOOD PRESSURE: 111 MMHG | TEMPERATURE: 98 F

## 2022-08-04 LAB
GLUCOSE BLDC GLUCOMTR-MCNC: 113 MG/DL — HIGH (ref 70–99)
GLUCOSE BLDC GLUCOMTR-MCNC: 193 MG/DL — HIGH (ref 70–99)
SARS-COV-2 RNA SPEC QL NAA+PROBE: SIGNIFICANT CHANGE UP

## 2022-08-04 PROCEDURE — 84484 ASSAY OF TROPONIN QUANT: CPT

## 2022-08-04 PROCEDURE — 83036 HEMOGLOBIN GLYCOSYLATED A1C: CPT

## 2022-08-04 PROCEDURE — 97530 THERAPEUTIC ACTIVITIES: CPT

## 2022-08-04 PROCEDURE — 83735 ASSAY OF MAGNESIUM: CPT

## 2022-08-04 PROCEDURE — 94640 AIRWAY INHALATION TREATMENT: CPT

## 2022-08-04 PROCEDURE — 36415 COLL VENOUS BLD VENIPUNCTURE: CPT

## 2022-08-04 PROCEDURE — 87641 MR-STAPH DNA AMP PROBE: CPT

## 2022-08-04 PROCEDURE — 87449 NOS EACH ORGANISM AG IA: CPT

## 2022-08-04 PROCEDURE — 97110 THERAPEUTIC EXERCISES: CPT

## 2022-08-04 PROCEDURE — 93005 ELECTROCARDIOGRAM TRACING: CPT

## 2022-08-04 PROCEDURE — 0225U NFCT DS DNA&RNA 21 SARSCOV2: CPT

## 2022-08-04 PROCEDURE — 96376 TX/PRO/DX INJ SAME DRUG ADON: CPT

## 2022-08-04 PROCEDURE — 85027 COMPLETE CBC AUTOMATED: CPT

## 2022-08-04 PROCEDURE — 93970 EXTREMITY STUDY: CPT

## 2022-08-04 PROCEDURE — 99285 EMERGENCY DEPT VISIT HI MDM: CPT

## 2022-08-04 PROCEDURE — 97162 PT EVAL MOD COMPLEX 30 MIN: CPT

## 2022-08-04 PROCEDURE — 96375 TX/PRO/DX INJ NEW DRUG ADDON: CPT

## 2022-08-04 PROCEDURE — 87640 STAPH A DNA AMP PROBE: CPT

## 2022-08-04 PROCEDURE — 84100 ASSAY OF PHOSPHORUS: CPT

## 2022-08-04 PROCEDURE — 80053 COMPREHEN METABOLIC PANEL: CPT

## 2022-08-04 PROCEDURE — 84145 PROCALCITONIN (PCT): CPT

## 2022-08-04 PROCEDURE — 80048 BASIC METABOLIC PNL TOTAL CA: CPT

## 2022-08-04 PROCEDURE — 87635 SARS-COV-2 COVID-19 AMP PRB: CPT

## 2022-08-04 PROCEDURE — 85025 COMPLETE CBC W/AUTO DIFF WBC: CPT

## 2022-08-04 PROCEDURE — 80061 LIPID PANEL: CPT

## 2022-08-04 PROCEDURE — 82962 GLUCOSE BLOOD TEST: CPT

## 2022-08-04 PROCEDURE — 93306 TTE W/DOPPLER COMPLETE: CPT

## 2022-08-04 PROCEDURE — 81001 URINALYSIS AUTO W/SCOPE: CPT

## 2022-08-04 PROCEDURE — 71045 X-RAY EXAM CHEST 1 VIEW: CPT

## 2022-08-04 PROCEDURE — 96374 THER/PROPH/DIAG INJ IV PUSH: CPT

## 2022-08-04 PROCEDURE — 83880 ASSAY OF NATRIURETIC PEPTIDE: CPT

## 2022-08-04 PROCEDURE — 71275 CT ANGIOGRAPHY CHEST: CPT

## 2022-08-04 RX ADMIN — Medication 20 MILLIGRAM(S): at 07:14

## 2022-08-04 RX ADMIN — Medication 1: at 11:34

## 2022-08-04 RX ADMIN — Medication 40 MILLIGRAM(S): at 07:17

## 2022-08-04 RX ADMIN — Medication 325 MILLIGRAM(S): at 11:27

## 2022-08-04 RX ADMIN — MONTELUKAST 10 MILLIGRAM(S): 4 TABLET, CHEWABLE ORAL at 12:29

## 2022-08-04 RX ADMIN — APIXABAN 5 MILLIGRAM(S): 2.5 TABLET, FILM COATED ORAL at 08:05

## 2022-08-04 RX ADMIN — ALBUTEROL 1 PUFF(S): 90 AEROSOL, METERED ORAL at 11:27

## 2022-08-04 RX ADMIN — PANTOPRAZOLE SODIUM 40 MILLIGRAM(S): 20 TABLET, DELAYED RELEASE ORAL at 07:15

## 2022-08-04 RX ADMIN — CEFTRIAXONE 100 MILLIGRAM(S): 500 INJECTION, POWDER, FOR SOLUTION INTRAMUSCULAR; INTRAVENOUS at 11:28

## 2022-08-04 RX ADMIN — Medication 1 DROP(S): at 07:14

## 2022-08-04 RX ADMIN — Medication 81 MILLIGRAM(S): at 11:27

## 2022-08-04 RX ADMIN — CARVEDILOL PHOSPHATE 6.25 MILLIGRAM(S): 80 CAPSULE, EXTENDED RELEASE ORAL at 07:13

## 2022-08-04 RX ADMIN — AZITHROMYCIN 255 MILLIGRAM(S): 500 TABLET, FILM COATED ORAL at 11:28

## 2022-08-04 NOTE — PROGRESS NOTE ADULT - PROBLEM SELECTOR PLAN 2
Bronchodilators prn  oxygen supp  Steroids  Spiriva  Pfts as OP.
As above
As above
Bronchodilators prn  oxygen supp  Steroids  Spiriva  Pfts as OP.
Bronchodilators prn  oxygen supp  Steroids  Spiriva  Pfts as OP.
As above
Bronchodilators prn  oxygen supp  Steroids  Spiriva  Pfts as OP.

## 2022-08-04 NOTE — PROGRESS NOTE ADULT - ASSESSMENT
89 yo Female, from home, ambulates using walker/wheelchair, lives with grand-daughter, with PMHx of COPD (not on home oxygen), Afib on Eliquis, HFpEF (Echo Nov 2021 EF 55-60%), prior DVT, DM, presenting with generalized weakness for 2 days, with worsening leg swelling and difficulty breathing, admitted for Acute hypoxic respiratory failure likely in the setting of CHF exacerbation.received iv lasix now transitioned to PO. was placed on abx for likely PNA Pt was score for velma by PT but family wants to take pt home. Pt has clinically improved and medically optimized for d/c.    Problem/Plan - 1:  ·  Problem: Acute respiratory failure with hypoxia.   ·  Plan: Pt p/w hypoxia to 77% at PMD's office, noted to have worsening lower extremity swelling, difficulty breathing  Possible due to medication non-compliance, lasix dose recently reduced outpatient     - EKG 7/28 - afib  - Trop: 197.5 >178, downtrending   - BNP: 2324 (from 750 last admission in Nov)  - pro calcitonin 0.05 on 7/30   - titrate oxygen off in reference to COPD target of 88-92 % , prevent over oxygenation  - Xray : Ill-defined airspace opacities bilaterally which may be infectious or related to CHF and or not present previously. Cardiomegaly with pulmonary venous engorgement.  - CTA 7/27 = small R sided pleural effusion, no evidence of PE     - Lasix dose decreased from 40 IV to 20 mg PO daily (BUN trending up) on 8/1   - strict I&Os, daily weights  - lower suspicion for pneumonia - pt. also on ceftriaxone, and azithromycin, today is Day 6 on 8/2  - As per ID: continue ABX until 8/3, may change to oral Augmentin 875 mg q 12hours on discharge   - - ID on board  - PO Abx on 8/4  - patient completed.    Problem/Plan - 2:  ·  Problem: Acute on chronic diastolic congestive heart failure.   ·  Plan: As above.    Problem/Plan - 3:  ·  Problem: Atrial fibrillation.   ·  Plan: PT has hx of Afib, rate controlled, on Eliquis and Coreg   C/w home medications.    Problem/Plan - 4:  ·  Problem: History of COPD.   ·  Plan: Pt has hx of COPD, uses Albuterol inhaler PRN, Singulair, Nebs as needed at home  Steroids being titrated to Qdaily, will stop steroids IV 7/31  Continue home inhalers.    Problem/Plan - 5:  ·  Problem: Diabetes.   ·  Plan: Pt not on any diabetes medications  HbA1C 6.5 Nov 2021.    Problem/Plan - 6:  ·  Problem: Breast cancer.   ·  Plan: Pt has hx of breast cancer, takes Anastrozole at home  Hold while in patient.    Problem/Plan - 7:  ·  Problem: DVT prophylaxis.   ·  Plan: C/w Eliquis (home med for prior hx of DVT).

## 2022-08-04 NOTE — PROGRESS NOTE ADULT - PROBLEM SELECTOR PROBLEM 7
DVT prophylaxis
Breast cancer
DVT prophylaxis
Diabetes
DVT prophylaxis
Diabetes
DVT prophylaxis
DVT prophylaxis
Diabetes
Diabetes
DVT prophylaxis
DVT prophylaxis
Breast cancer
DVT prophylaxis
DVT prophylaxis

## 2022-08-04 NOTE — PROGRESS NOTE ADULT - PROVIDER SPECIALTY LIST ADULT
Infectious Disease
Internal Medicine
Cardiology
Infectious Disease
Internal Medicine
Internal Medicine
Infectious Disease
Pulmonology
Cardiology
Internal Medicine
Pulmonology
Internal Medicine
Pulmonology
Cardiology
Cardiology
Pulmonology
Internal Medicine
Cardiology
Internal Medicine
Pulmonology
Pulmonology

## 2022-08-04 NOTE — PROGRESS NOTE ADULT - PROBLEM SELECTOR PROBLEM 6
Breast cancer
Atrial fibrillation
Breast cancer
Atrial fibrillation
Breast cancer
Atrial fibrillation
Atrial fibrillation
Breast cancer
Diabetes
Breast cancer
Breast cancer
Diabetes
Breast cancer

## 2022-08-04 NOTE — DISCHARGE NOTE NURSING/CASE MANAGEMENT/SOCIAL WORK - NSDCVIVACCINE_GEN_ALL_CORE_FT
influenza, injectable, quadrivalent, preservative free; 27-Oct-2017 14:11; Brandy Joaquin (RN); [employer]; [id number]; IntraMuscular; Deltoid Left.; 0.5 milliLiter(s); VIS (VIS Published: 07-Aug-2015, VIS Presented: 27-Oct-2017);   influenza, high-dose, quadrivalent; 18-Nov-2021 06:24; Yemi Santana (WINTER); [employer]; [id number] (Exp. Date: 30-Jun-2022); IntraMuscular; Deltoid Left.; 0.7 milliLiter(s); VIS (VIS Published: 06-Aug-2021, VIS Presented: 18-Nov-2021);

## 2022-08-04 NOTE — DISCHARGE NOTE PROVIDER - NSDCMRMEDTOKEN_GEN_ALL_CORE_FT
albuterol 90 mcg/inh inhalation aerosol: 1 puff(s) inhaled 2 times a day  anastrozole 1 mg oral tablet: 1 tab(s) orally once a day  aspirin 81 mg oral tablet, chewable: 1 tab(s) orally once a day  Coreg 6.25 mg oral tablet: 1 tab(s) orally 2 times a day  Eliquis 5 mg oral tablet: 1 tab(s) orally every 12 hours  FeroSul 325 mg (65 mg elemental iron) oral tablet: 1 tab(s) orally once a day  furosemide 20 mg oral tablet: 1 tab(s) orally once a day  montelukast 10 mg oral tablet: 1 tab(s) orally once a day  ocular lubricant ophthalmic solution: 1 drop(s) to each affected eye 2 times a day  simvastatin 20 mg oral tablet: 1 tab(s) orally once a day (at bedtime)  Vitamin D3 125 mcg (5000 intl units) oral capsule: 1 cap(s) orally once a day

## 2022-08-04 NOTE — PROGRESS NOTE ADULT - TIME BILLING
- Review of records, telemetry, vital signs and daily labs.   - General and cardiovascular physical examination.  - Generation of cardiovascular treatment plan.  - Coordination of care.      Patient was seen and examined by me on 08/04/2022,interim events noted,labs and radiology studies reviewed.  Chaparro Denton MD,FACC.  [address] [place], [place] [id number].  [phone]
- Review of records, telemetry, vital signs and daily labs.   - General and cardiovascular physical examination.  - Generation of cardiovascular treatment plan.  - Coordination of care.      Patient was seen and examined by me on 8/1/22interim events noted,labs and radiology studies reviewed.  Chaparro Denton MD,FACC.  [address] [place], [place] [id number].  [phone]
- Review of records, telemetry, vital signs and daily labs.   - General and cardiovascular physical examination.  - Generation of cardiovascular treatment plan.  - Coordination of care.      Patient was seen and examined by me on 7/31/22interim events noted,labs and radiology studies reviewed.  Chaparro Denton MD,FACC.  [address] [place], [place] [id number].  [phone]
- Review of records, telemetry, vital signs and daily labs.   - General and cardiovascular physical examination.  - Generation of cardiovascular treatment plan.  - Coordination of care.      Patient was seen and examined by me on 8/3/22,interim events noted,labs and radiology studies reviewed.  Chaparro Denton MD,FACC.  [address] [place], [place] [id number].  [phone]
- Review of records, telemetry, vital signs and daily labs.   - General and cardiovascular physical examination.  - Generation of cardiovascular treatment plan.  - Coordination of care.      Patient was seen and examined by me on 8/2/22,interim events noted,labs and radiology studies reviewed.  Chaparro Denton MD,FACC.  [address] [place], [place] [id number].  [phone]

## 2022-08-04 NOTE — PROGRESS NOTE ADULT - PROBLEM SELECTOR PROBLEM 1
Acute respiratory failure with hypoxia

## 2022-08-04 NOTE — PROGRESS NOTE ADULT - SUBJECTIVE AND OBJECTIVE BOX
DATE OF SERVICE: 08-04-22  Patient was seen and examined on    08-04-22.Interim events noted.Consultant notes ,Labs,Telemetry reviewed by me       HOSPITAL COURSE: HPI:  Pt is a 89 yo Female, from home, ambulates using walker/wheelchair, lives with grand-daughter, with PMHx of COPD (not on home oxygen), Afib on Eliquis, HFpEF (Echo Nov 2021 EF 55-60%), prior DVT, DM, presenting with generalized weakness for 2 days. Pt is a poor historian--She attributed her weakness to the summer heat. Says her weakness worsened this morning, her symptoms worsened, which prompted her to go to her PMD. She was found to be hypoxic at her PMD Dr. Bianchi to 77%. She endorses leg swelling, but states that her leg swelling has been on and off for many years. Grand-daughter at bedside endorses that medications are usually given by patient's daughter to the patient, pt may have missed a few doses of Lasix. Endorses that family tries their best to have a salt restricted/fluid restricted diet. Papers from PMD office shows that pt is on Lasix 20 mg daily, was on Lasix 40 mg last month--unsure why dose was changed, family and patient unsure of the dosage as well. Pt endorses no chest pain, cough, sputum production, palpitations, cough, fever, abdominal pain, constipation, diarrhea, nausea, vomiting. She reports no increase in use of her nebulizer in the fast few days. No recent sick contacts, or travel.     INTERIM EVENTS:Patient seen at bedside ,interim events noted.Awake no dyspnea       PMH -reviewed admission note, no change since admission  HEART FAILURE: Acute[ ]Chronic[x ] Systolic[ ] Diastolic[x ] Combined Systolic and Diastolic[ ]  CAD[ ] CABG[ ] PCI[ ]  DEVICES[ ] PPM[ ] ICD[ ] ILR[ ]  ATRIAL FIBRILLATION[ ] Paroxysmal[ ] Permanent[ ] CHADS2-[  ]  JACOBO[ ] CKD1[ ] CKD2[ ] CKD3[ ] CKD4[ ] ESRD[ ]  COPD[ ] HTN[ ]   DM[ ] Type1[ ] Type 2[ ]   CVA[ ] Paresis[ ]    AMBULATION: Assisted[x ] Cane/walker[ ] Independent[ ]    MEDICATIONS  (STANDING):  ALBUTerol    90 MICROgram(s) HFA Inhaler 1 Puff(s) Inhalation two times a day  apixaban 5 milliGRAM(s) Oral two times a day  artificial  tears Solution 1 Drop(s) Both EYES two times a day  aspirin  chewable 81 milliGRAM(s) Oral daily  carvedilol 6.25 milliGRAM(s) Oral every 12 hours  ferrous    sulfate 325 milliGRAM(s) Oral daily  furosemide    Tablet 20 milliGRAM(s) Oral daily  insulin lispro (ADMELOG) corrective regimen sliding scale   SubCutaneous Before meals and at bedtime  montelukast 10 milliGRAM(s) Oral daily  pantoprazole    Tablet 40 milliGRAM(s) Oral before breakfast  simvastatin 20 milliGRAM(s) Oral at bedtime              REVIEW OF SYSTEMS:  Constitutional: [ ] fever, [ ]weight loss,  [ ]fatigue [ ]weight gain  Eyes: [ ] visual changes  Respiratory: [ ]shortness of breath;  [ ] cough, [ ]wheezing, [ ]chills, [ ]hemoptysis  Cardiovascular: [ ] chest pain, [ ]palpitations, [ ]dizziness,  [ ]leg swelling[ ]orthopnea[ ]PND  Gastrointestinal: [ ] abdominal pain, [ ]nausea, [ ]vomiting,  [ ]diarrhea [ ]Constipation [ ]Melena  Genitourinary: [ ] dysuria, [ ] hematuria [ ][place]ley  Neurologic: [ ] headaches [ ] tremors[ ]weakness [ ]Paralysis Right[ ] Left[ ]  Skin: [ ] itching, [ ]burning, [ ] rashes  Endocrine: [ ] heat or cold intolerance  Musculoskeletal: [ ] joint pain or swelling; [ ] muscle, back, or extremity pain  Psychiatric: [ ] depression, [ ]anxiety, [ ]mood swings, or [ ]difficulty sleeping  Hematologic: [ ] easy bruising, [ ] bleeding gums    [ ] All remaining systems negative except as per above.   [ ]Unable to obtain.  [x] No change in ROS since admission      Vital Signs Last 24 Hrs  T(C): 36.6 (04 Aug 2022 13:16), Max: 36.6 (04 Aug 2022 13:16)  T(F): 97.9 (04 Aug 2022 13:16), Max: 97.9 (04 Aug 2022 13:16)  HR: 79 (04 Aug 2022 13:16) (79 - 94)  BP: 111/91 (04 Aug 2022 13:16) (111/91 - 120/93)  RR: 18 (04 Aug 2022 13:16) (18 - 18)  SpO2: 100% (04 Aug 2022 13:16) (93% - 100%)    Parameters below as of 04 Aug 2022 13:16  Patient On (Oxygen Delivery Method): room air      I&O's Summary    03 Aug 2022 07:01  -  04 Aug 2022 07:00  --------------------------------------------------------  IN: 120 mL / OUT: 650 mL / NET: -530 mL        PHYSICAL EXAM:  General: No acute distress BMI-34  HEENT:visually impaired  Neck: Supple, [ ] JVD  Lungs: Equal air entry bilaterally; [ ] rales [ ] wheezing [ ] rhonchi  Heart: Regular rate and rhythm; [x ] murmur   2/6 [ x] systolic [ ] diastolic [ ] radiation[ ] rubs [ ]  gallops  Abdomen: Nontender, bowel sounds present  Extremities: No clubbing, cyanosis, [ ] edema [ ]Pulses  equal and intact  Nervous system:  Alert & Oriented X3, no focal deficits  Psychiatric: Normal affect  Skin: No rashes or lesions    LABS:  08-03    139  |  93<L>  |  20<H>  ----------------------------<  155<H>  4.3   |  43<H>  |  0.93    Ca    9.8      03 Aug 2022 07:15  Phos  2.8     08-03  Mg     2.5     08-03      Creatinine Trend: 0.93<--, 1.03<--, 0.88<--, 0.97<--, 0.94<--, 0.98<--                        14.5   9.80  )-----------( 273      ( 03 Aug 2022 07:15 )             45.1           ECHO:  Study Date: 7/29/2022  CONCLUSIONS:  1. Normal mitral valve.  2. Calcified trileaflet aortic valve with normal opening.  3. Aortic Root: 4.0 cm.  4. Normal left atrium.  LA volume index = 32 cc/m2.  5. Mild concentric left ventricular hypertrophy.  6. Normal Left Ventricular Systolic Function,  (EF = 55 to 60%)  7. Grade I diastolic dysfunction (Impaired relaxation, mild).  8. Normal right atrium.  9. Normal right ventricular size and function.  10. RV systolic pressure is mildly increased at  39 mm Hg.  11. Normal tricuspid valve.  12. There is trace pulmonic regurgitation.  13. Normal pericardium with no pericardial effusion.

## 2022-08-04 NOTE — PROGRESS NOTE ADULT - PROBLEM SELECTOR PROBLEM 2
Acute on chronic diastolic congestive heart failure
COPD exacerbation
Acute on chronic diastolic congestive heart failure
Acute on chronic diastolic congestive heart failure
COPD exacerbation
Acute on chronic diastolic congestive heart failure
COPD exacerbation
Acute on chronic diastolic congestive heart failure
COPD exacerbation

## 2022-08-04 NOTE — PROGRESS NOTE ADULT - PROBLEM SELECTOR PROBLEM 4
History of COPD
Acute on chronic diastolic congestive heart failure
History of COPD
Acute on chronic diastolic congestive heart failure
History of COPD
Acute on chronic diastolic congestive heart failure
Acute on chronic diastolic congestive heart failure
History of COPD
Acute on chronic diastolic congestive heart failure
History of COPD
Acute on chronic diastolic congestive heart failure

## 2022-08-04 NOTE — PROGRESS NOTE ADULT - PROBLEM SELECTOR PROBLEM 3
Atrial fibrillation
Edema of both lower legs
Atrial fibrillation
Edema of both lower legs
Atrial fibrillation
Edema of both lower legs
Atrial fibrillation
Edema of both lower legs
Atrial fibrillation
Edema of both lower legs
Edema of both lower legs

## 2022-08-04 NOTE — DISCHARGE NOTE PROVIDER - CARE PROVIDER_API CALL
Ayden Bianchi)  [facility]  [address] [place] [address], [place] [address]  Phone: [phone]  Fax: [phone]  Follow Up Time: 1 week

## 2022-08-04 NOTE — PROGRESS NOTE ADULT - PROBLEM SELECTOR PROBLEM 5
Diabetes
Pulmonary HTN
Diabetes
Diabetes
Pulmonary HTN
Diabetes
Pulmonary HTN
Pulmonary HTN
Atrial fibrillation
Atrial fibrillation
Diabetes

## 2022-08-04 NOTE — PROGRESS NOTE ADULT - ASSESSMENT
Patient is a 89y old  Female from home, ambulates using walker/wheelchair, lives with grand-daughter, with PMHx of COPD (not on home oxygen), Afib on Eliquis, HFpEF (Echo Nov 2021 EF 55-60%), prior DVT, DM, presents to the ER for evaluation of generalized weakness for 2 days. She went to see her PMD and found to be hypoxic at her PMD, Dr. Bianchi's office,  to 77%. She endorses leg swelling, but states that her leg swelling has been on and off for many years. On admission, she found to have no fever, no Leukocytosis but CXR shows  Ill-defined airspace opacities bilaterally which may be infectious or related to CHF and or not present previously. She has started on Ceftriaxone and Azithromycin, and the ID consult requested to assist with further evaluation and antibiotic management.     # Pneumonia  - on CXR- MRSA PCR and Legionella urine AG is negative  # CHF exacerbation- repeat CXR pending    would recommend:    1. Monitor off Abx as she completed the course   2. Diuresis as per Primary/cardiology team  3. Keep Both LE elevated  4.  PT/OOB to chair     Attending Attestation:  Time-based billing (NON-critical care).   Spent more than 35 minutes on total encounter, more than 50 % of the visit was spent counseling and/or coordinating care by the Attending physician.  The necessity of the time spent during the encounter on this date of service is due to complexity of:    - Pneumonia  - CHF exacerbation  - Hypoxia- on supplemental oxygenation

## 2022-08-04 NOTE — PROGRESS NOTE ADULT - PROBLEM SELECTOR PLAN 7
Accuchecks with regular insulin coverage  HbA1C.
C/w Eliquis (home med for prior hx of DVT)
Heme/onc eval.
Accuchecks with regular insulin coverage  HbA1C.
C/w Eliquis (home med for prior hx of DVT)
Accuchecks with regular insulin coverage  HbA1C.
C/w Eliquis (home med for prior hx of DVT)
Heme/onc eval.
Accuchecks with regular insulin coverage  HbA1C.
C/w Eliquis (home med for prior hx of DVT)
C/w Eliquis (home med for prior hx of DVT)

## 2022-08-04 NOTE — PROGRESS NOTE ADULT - PROBLEM SELECTOR PLAN 8
Heme/onc eval.
Heme/onc eval.
Already on Lovenox SQ.
Already on Lovenox SQ.
Heme/onc eval.
Heme/onc eval.

## 2022-08-04 NOTE — DISCHARGE NOTE NURSING/CASE MANAGEMENT/SOCIAL WORK - NSDCPEFALRISK_GEN_ALL_CORE
For information on Fall & Injury Prevention, visit: [email or web address] OR  [email or web address] OR  https://www.cdc.gov/steadi/patient.html

## 2022-08-04 NOTE — PROGRESS NOTE ADULT - SUBJECTIVE AND OBJECTIVE BOX
brianna is seen and examined at the bed side, is afebrile. She is doing better and sitting up on a chair.      REVIEW OF SYSTEMS: All other review systems are negative       ALLERGIES: losartan (Angioedema)      Vital Signs Last 24 Hrs  T(C): 36.6 (04 Aug 2022 13:16), Max: 37 (04 Aug 2022 05:25)  T(F): 97.9 (04 Aug 2022 13:16), Max: 98.6 (04 Aug 2022 05:25)  HR: 79 (04 Aug 2022 13:16) (78 - 94)  BP: 111/91 (04 Aug 2022 13:16) (111/91 - 132/84)  BP(mean): --  RR: 18 (04 Aug 2022 13:16) (18 - 19)  SpO2: 100% (04 Aug 2022 13:16) (92% - 100%)    Parameters below as of 04 Aug 2022 13:16  Patient On (Oxygen Delivery Method): room air        PHYSICAL EXAM:  GENERAL: Not in distress  CHEST/LUNG:  Not using accessory muscles   HEART: s1 and s2 present  ABDOMEN:  Nontender and  Nondistended  EXTREMITIES: B/L pedal  edema almost resolved  CNS: Awake and alert      LABS: No new Labs                        14.5   9.80  )-----------( 273      ( 03 Aug 2022 07:15 )             45.1                       14.3   7.10  )-----------( 260      ( 02 Aug 2022 10:48 )             46.0       08-03    139  |  93<L>  |  20<H>  ----------------------------<  155<H>  4.3   |  43<H>  |  0.93    Ca    9.8      03 Aug 2022 07:15  Phos  2.8     08-03  Mg     2.5     08-03    TPro  7.6  /  Alb  3.2<L>  /  TBili  0.6  /  DBili  x   /  AST  12  /  ALT  27  /  AlkPhos  134<H>      08-02    138  |  91<L>  |  22<H>  ----------------------------<  235<H>  4.2   |  41<H>  |  1.03    Ca    9.2      02 Aug 2022 10:48  Phos  2.8     08-  Mg     2.3     08-02    TPro  7.6  /  Alb  3.2<L>  /  TBili  0.6  /  DBili  x   /  AST  12  /  ALT  27  /  AlkPhos  134<H>  08-        CAPILLARY BLOOD GLUCOSE  POCT Blood Glucose.: 192 mg/dL (2022 11:37)  POCT Blood Glucose.: 189 mg/dL (2022 07:37)  POCT Blood Glucose.: 205 mg/dL (2022 00:05)        Urinalysis Basic - ( 2022 03:10 )  Color: Yellow / Appearance: Slightly Turbid / S.010 / pH: x  Gluc: x / Ketone: Negative  / Bili: Negative / Urobili: Negative   Blood: x / Protein: 15 / Nitrite: Negative   Leuk Esterase: Small / RBC: 2-5 /HPF / WBC 3-5 /HPF   Sq Epi: x / Non Sq Epi: Few /HPF / Bacteria: Moderate /HPF      MEDICATIONS  (STANDING):  ALBUTerol    90 MICROgram(s) HFA Inhaler 1 Puff(s) Inhalation two times a day  apixaban 5 milliGRAM(s) Oral two times a day  artificial  tears Solution 1 Drop(s) Both EYES two times a day  aspirin  chewable 81 milliGRAM(s) Oral daily  carvedilol 6.25 milliGRAM(s) Oral every 12 hours  ferrous    sulfate 325 milliGRAM(s) Oral daily  furosemide    Tablet 20 milliGRAM(s) Oral daily  insulin lispro (ADMELOG) corrective regimen sliding scale   SubCutaneous Before meals and at bedtime  montelukast 10 milliGRAM(s) Oral daily  pantoprazole    Tablet 40 milliGRAM(s) Oral before breakfast  simvastatin 20 milliGRAM(s) Oral at bedtime        RADIOLOGY & ADDITIONAL TESTS:    22: CT Angio Chest PE Protocol w/ IV Cont (22 @ 22:52) Limited evaluation for segmental and subsegmental pulmonary embolism. No   main, left right, or lobar pulmonary embolism.      22: Xray Chest 1 View- PORTABLE-Urgent (22 @ 15:55) >  1. Ill-defined airspace opacities bilaterally which may be infectious or related to CHF and or not present previously.  2. Cardiomegaly with pulmonary venous engorgement.  3. Elevated right hemidiaphragm, unchanged.      MICROBIOLOGY DATA:    Legionella pneumophila Antigen, Urine (22 @ 21:40)   Legionella Antigen, Urine: Negative    MRSA/MSSA PCR (22 @ 17:50)   MRSA PCR Result.: NotDetec:     Respiratory Viral Panel with COVID-19 by PATRICK (22 @ 15:30)   Rapid RVP Result: NotDetec   SARS-CoV-2: NotDetec:

## 2022-08-04 NOTE — DISCHARGE NOTE PROVIDER - NSDCCPCAREPLAN_GEN_ALL_CORE_FT
PRINCIPAL DISCHARGE DIAGNOSIS  Diagnosis: Acute respiratory failure with hypoxia  Assessment and Plan of Treatment: You presented to hospotal with shortness of breath likely due to pnemonia  vs heart failure. You were seen by infectious disease, pulomonologist and cardirologist, You wre given antibiotics., steroids and IV laix. YOur symtoms has improved. YOu wre monitored on heart monitor while managing your care. Please follow up with your PCP and cardirologist, continue to take medicaitons as prescirbed.      SECONDARY DISCHARGE DIAGNOSES  Diagnosis: History of COPD  Assessment and Plan of Treatment: Continue to take your inhaler    Diagnosis: Atrial fibrillation  Assessment and Plan of Treatment: Continue to take nguyen Vo and follow up with Dr alexnader

## 2022-08-04 NOTE — DISCHARGE NOTE NURSING/CASE MANAGEMENT/SOCIAL WORK - PATIENT PORTAL LINK FT
You can access the FollowMyHealth Patient Portal offered by [facility] by registering at the following website: [email or web address]. By joining [email or web address] [email or web address] portal, you will also be able to view your health information using other applications (apps) compatible with our system.

## 2022-08-04 NOTE — PROGRESS NOTE ADULT - PROBLEM SELECTOR PLAN 6
Pt has hx of breast cancer, takes Anastrozole at home  Hold while in patient
Cardio follow up  Tele monitoring.
Pt has hx of breast cancer, takes Anastrozole at home  Hold while in patient
Cardio follow up  Tele monitoring.
Pt has hx of breast cancer, takes Anastrozole at home  Hold while in patient
Pt has hx of breast cancer, takes Anastrozole at home  Hold while in patient
Accuchecks with regular insulin coverage  HbA1C.
Pt has hx of breast cancer, takes Anastrozole at home  Hold while in patient
Cardio follow up  Tele monitoring.
Pt has hx of breast cancer, takes Anastrozole at home  Hold while in patient
Pt has hx of breast cancer, takes Anastrozole at home  Hold while in patient
Accuchecks with regular insulin coverage  HbA1C.
Cardio follow up  Tele monitoring.
Pt has hx of breast cancer, takes Anastrozole at home  Hold while in patient

## 2022-08-04 NOTE — PROGRESS NOTE ADULT - SUBJECTIVE AND OBJECTIVE BOX
Patient is a 89y old  Female who presents with a chief complaint of Weakness, SOB (03 Aug 2022 15:57)  Awake, alert, comfortable in bed in NAD. Doing well on RA. Leg edema resolved    INTERVAL HPI/OVERNIGHT EVENTS:      VITAL SIGNS:  T(F): 98.6 (08-04-22 @ 05:25)  HR: 82 (08-04-22 @ 05:25)  BP: 132/84 (08-04-22 @ 05:25)  RR: 18 (08-04-22 @ 05:25)  SpO2: 92% (08-04-22 @ 05:25)  Wt(kg): --  I&O's Detail    03 Aug 2022 07:01  -  04 Aug 2022 07:00  --------------------------------------------------------  IN:    Oral Fluid: 120 mL  Total IN: 120 mL    OUT:    Voided (mL): 650 mL  Total OUT: 650 mL    Total NET: -530 mL              REVIEW OF SYSTEMS:    CONSTITUTIONAL:  No fevers, chills, sweats    HEENT:  Eyes:  No diplopia or blurred vision. ENT:  No earache, sore throat or runny nose.    CARDIOVASCULAR:  No pressure, squeezing, tightness, or heaviness about the chest; no palpitations.    RESPIRATORY:  Per HPI    GASTROINTESTINAL:  No abdominal pain, nausea, vomiting or diarrhea.    GENITOURINARY:  No dysuria, frequency or urgency.    NEUROLOGIC:  No paresthesias, fasciculations, seizures or weakness.    PSYCHIATRIC:  No disorder of thought or mood.      PHYSICAL EXAM:    Constitutional: Well developed and nourished  Eyes:Perrla  ENMT: normal  Neck:supple  Respiratory: good air entry  Cardiovascular: S1 S2 regular  Gastrointestinal: Soft, Non tender  Extremities: No edema  Vascular:normal  Neurological:Awake, alert,Ox3  Musculoskeletal:Normal      MEDICATIONS  (STANDING):  ALBUTerol    90 MICROgram(s) HFA Inhaler 1 Puff(s) Inhalation two times a day  apixaban 5 milliGRAM(s) Oral two times a day  artificial  tears Solution 1 Drop(s) Both EYES two times a day  aspirin  chewable 81 milliGRAM(s) Oral daily  azithromycin  IVPB 500 milliGRAM(s) IV Intermittent every 24 hours  azithromycin  IVPB      carvedilol 6.25 milliGRAM(s) Oral every 12 hours  cefTRIAXone   IVPB 1000 milliGRAM(s) IV Intermittent every 24 hours  ferrous    sulfate 325 milliGRAM(s) Oral daily  furosemide    Tablet 20 milliGRAM(s) Oral daily  insulin lispro (ADMELOG) corrective regimen sliding scale   SubCutaneous Before meals and at bedtime  methylPREDNISolone sodium succinate Injectable 40 milliGRAM(s) IV Push daily  montelukast 10 milliGRAM(s) Oral daily  pantoprazole    Tablet 40 milliGRAM(s) Oral before breakfast  simvastatin 20 milliGRAM(s) Oral at bedtime    MEDICATIONS  (PRN):      Allergies    losartan (Angioedema)    Intolerances    Chicken (Stomach Upset)      LABS:                        14.5   9.80  )-----------( 273      ( 03 Aug 2022 07:15 )             45.1     08-03    139  |  93<L>  |  20<H>  ----------------------------<  155<H>  4.3   |  43<H>  |  0.93    Ca    9.8      03 Aug 2022 07:15  Phos  2.8     08-03  Mg     2.5     08-03                CAPILLARY BLOOD GLUCOSE      POCT Blood Glucose.: 113 mg/dL (04 Aug 2022 07:55)  POCT Blood Glucose.: 200 mg/dL (03 Aug 2022 21:07)  POCT Blood Glucose.: 173 mg/dL (03 Aug 2022 16:42)  POCT Blood Glucose.: 229 mg/dL (03 Aug 2022 11:43)    pro-bnp 2324 07-28 @ 15:30     d-dimer --  07-28 @ 15:30      RADIOLOGY & ADDITIONAL TESTS:    CXR:  < from: Xray Chest 1 View- PORTABLE-Urgent (07.28.22 @ 15:55) >  IMPRESSION:  1. Ill-defined airspace opacities bilaterally which may be infectious or   related to CHF and or not present previously.  2. Cardiomegaly with pulmonary venous engorgement.  3. Elevated right hemidiaphragm, unchanged.      < end of copied text >    Ct scan chest:    ekg;    echo:< from: Transthoracic Echocardiogram (07.29.22 @ 08:30) >  CONCLUSIONS:  1. Normal mitral valve.  2. Calcified trileaflet aortic valve with normal opening.  3. Aortic Root: 4.0 cm.  4. Normal left atrium.  LA volume index = 32 cc/m2.  5. Mild concentric left ventricular hypertrophy.  6. Normal Left Ventricular Systolic Function,  (EF = 55 to  60%)  7. Grade I diastolic dysfunction (Impaired relaxation,  mild).  8. Normal right atrium.  9. Normal right ventricular size and function.  10. RV systolic pressure is mildly increased at  39 mm Hg.  11. Normal tricuspid valve.  12. There is trace pulmonic regurgitation.  13. Normal pericardium with no pericardial effusion.      < end of copied text >

## 2022-08-04 NOTE — DISCHARGE NOTE PROVIDER - HOSPITAL COURSE
Pt is a 89 yo Female, from home, ambulates using walker/wheelchair, lives with grand-daughter, with PMHx of COPD (not on home oxygen), Afib on Eliquis, HFpEF (Echo Nov 2021 EF 55-60%), prior DVT, DM, presenting with generalized weakness for 2 days, with worsening leg swelling and difficulty breathing, admitted for Acute hypoxic respiratory failure likely in the setting of CHF exacerbation. pt was seen by cardiologist. received iv lasix now transitioned to PO. was placed on abx for likely PNA Pt was score for velma by PT but family wants to take pt home. Pt has clinically improved and medically optimized for d/c.  Please note that this a brief summary of hospital course please refer to daily progress notes and consult notes for full course and events    Please note that this a brief summary of hospital course please refer to daily progress notes and consult notes for full course and events   discussed with son in room Jhoan in room and all questions were answered.

## 2022-10-10 ENCOUNTER — INPATIENT (INPATIENT)
Facility: HOSPITAL | Age: 87
LOS: 9 days | Discharge: HOME | DRG: 177 | End: 2022-10-20
Attending: INTERNAL MEDICINE | Admitting: INTERNAL MEDICINE
Payer: MEDICARE

## 2022-10-10 VITALS
HEART RATE: 77 BPM | WEIGHT: 240.08 LBS | HEIGHT: 70 IN | SYSTOLIC BLOOD PRESSURE: 159 MMHG | TEMPERATURE: 99 F | OXYGEN SATURATION: 84 % | DIASTOLIC BLOOD PRESSURE: 79 MMHG | RESPIRATION RATE: 20 BRPM

## 2022-10-10 DIAGNOSIS — U07.1 COVID-19: ICD-10-CM

## 2022-10-10 LAB
ALBUMIN SERPL ELPH-MCNC: 3.2 G/DL — LOW (ref 3.5–5)
ALP SERPL-CCNC: 144 U/L — HIGH (ref 40–120)
ALT FLD-CCNC: 21 U/L DA — SIGNIFICANT CHANGE UP (ref 10–60)
ANION GAP SERPL CALC-SCNC: 5 MMOL/L — SIGNIFICANT CHANGE UP (ref 5–17)
APTT BLD: 41.1 SEC — HIGH (ref 27.5–35.5)
AST SERPL-CCNC: 36 U/L — SIGNIFICANT CHANGE UP (ref 10–40)
BASOPHILS # BLD AUTO: 0.04 K/UL — SIGNIFICANT CHANGE UP (ref 0–0.2)
BASOPHILS NFR BLD AUTO: 0.6 % — SIGNIFICANT CHANGE UP (ref 0–2)
BILIRUB SERPL-MCNC: 0.6 MG/DL — SIGNIFICANT CHANGE UP (ref 0.2–1.2)
BUN SERPL-MCNC: 11 MG/DL — SIGNIFICANT CHANGE UP (ref 7–18)
CALCIUM SERPL-MCNC: 8.7 MG/DL — SIGNIFICANT CHANGE UP (ref 8.4–10.5)
CHLORIDE SERPL-SCNC: 100 MMOL/L — SIGNIFICANT CHANGE UP (ref 96–108)
CO2 SERPL-SCNC: 31 MMOL/L — SIGNIFICANT CHANGE UP (ref 22–31)
CREAT SERPL-MCNC: 0.89 MG/DL — SIGNIFICANT CHANGE UP (ref 0.5–1.3)
EGFR: 62 ML/MIN/1.73M2 — SIGNIFICANT CHANGE UP
EOSINOPHIL # BLD AUTO: 0.21 K/UL — SIGNIFICANT CHANGE UP (ref 0–0.5)
EOSINOPHIL NFR BLD AUTO: 3.2 % — SIGNIFICANT CHANGE UP (ref 0–6)
FLUAV AG NPH QL: SIGNIFICANT CHANGE UP
FLUBV AG NPH QL: SIGNIFICANT CHANGE UP
GLUCOSE SERPL-MCNC: 144 MG/DL — HIGH (ref 70–99)
HCT VFR BLD CALC: 43.8 % — SIGNIFICANT CHANGE UP (ref 34.5–45)
HGB BLD-MCNC: 13.6 G/DL — SIGNIFICANT CHANGE UP (ref 11.5–15.5)
IMM GRANULOCYTES NFR BLD AUTO: 0.3 % — SIGNIFICANT CHANGE UP (ref 0–0.9)
INR BLD: 1.61 RATIO — HIGH (ref 0.88–1.16)
LIDOCAIN IGE QN: 26 U/L — LOW (ref 73–393)
LYMPHOCYTES # BLD AUTO: 0.78 K/UL — LOW (ref 1–3.3)
LYMPHOCYTES # BLD AUTO: 11.7 % — LOW (ref 13–44)
MCHC RBC-ENTMCNC: 30.7 PG — SIGNIFICANT CHANGE UP (ref 27–34)
MCHC RBC-ENTMCNC: 31.1 GM/DL — LOW (ref 32–36)
MCV RBC AUTO: 98.9 FL — SIGNIFICANT CHANGE UP (ref 80–100)
MONOCYTES # BLD AUTO: 0.6 K/UL — SIGNIFICANT CHANGE UP (ref 0–0.9)
MONOCYTES NFR BLD AUTO: 9 % — SIGNIFICANT CHANGE UP (ref 2–14)
NEUTROPHILS # BLD AUTO: 5 K/UL — SIGNIFICANT CHANGE UP (ref 1.8–7.4)
NEUTROPHILS NFR BLD AUTO: 75.2 % — SIGNIFICANT CHANGE UP (ref 43–77)
NRBC # BLD: 0 /100 WBCS — SIGNIFICANT CHANGE UP (ref 0–0)
NT-PROBNP SERPL-SCNC: 1763 PG/ML — HIGH (ref 0–450)
PLATELET # BLD AUTO: 200 K/UL — SIGNIFICANT CHANGE UP (ref 150–400)
POTASSIUM SERPL-MCNC: 6.3 MMOL/L — CRITICAL HIGH (ref 3.5–5.3)
POTASSIUM SERPL-SCNC: 6.3 MMOL/L — CRITICAL HIGH (ref 3.5–5.3)
PROT SERPL-MCNC: 7.5 G/DL — SIGNIFICANT CHANGE UP (ref 6–8.3)
PROTHROM AB SERPL-ACNC: 19.3 SEC — HIGH (ref 10.5–13.4)
RBC # BLD: 4.43 M/UL — SIGNIFICANT CHANGE UP (ref 3.8–5.2)
RBC # FLD: 13.4 % — SIGNIFICANT CHANGE UP (ref 10.3–14.5)
SARS-COV-2 RNA SPEC QL NAA+PROBE: DETECTED
SODIUM SERPL-SCNC: 136 MMOL/L — SIGNIFICANT CHANGE UP (ref 135–145)
TROPONIN I, HIGH SENSITIVITY RESULT: 168.6 NG/L — HIGH
WBC # BLD: 6.65 K/UL — SIGNIFICANT CHANGE UP (ref 3.8–10.5)
WBC # FLD AUTO: 6.65 K/UL — SIGNIFICANT CHANGE UP (ref 3.8–10.5)

## 2022-10-10 PROCEDURE — 99284 EMERGENCY DEPT VISIT MOD MDM: CPT

## 2022-10-10 PROCEDURE — 93971 EXTREMITY STUDY: CPT | Mod: 26,LT

## 2022-10-10 PROCEDURE — 71045 X-RAY EXAM CHEST 1 VIEW: CPT | Mod: 26

## 2022-10-10 RX ORDER — REMDESIVIR 5 MG/ML
200 INJECTION INTRAVENOUS EVERY 24 HOURS
Refills: 0 | Status: COMPLETED | OUTPATIENT
Start: 2022-10-10 | End: 2022-10-11

## 2022-10-10 RX ORDER — FUROSEMIDE 40 MG
1 TABLET ORAL
Qty: 0 | Refills: 0 | DISCHARGE

## 2022-10-10 RX ORDER — DEXAMETHASONE 0.5 MG/5ML
6 ELIXIR ORAL ONCE
Refills: 0 | Status: COMPLETED | OUTPATIENT
Start: 2022-10-10 | End: 2022-10-10

## 2022-10-10 RX ORDER — FUROSEMIDE 40 MG
40 TABLET ORAL ONCE
Refills: 0 | Status: COMPLETED | OUTPATIENT
Start: 2022-10-10 | End: 2022-10-10

## 2022-10-10 RX ORDER — REMDESIVIR 5 MG/ML
INJECTION INTRAVENOUS
Refills: 0 | Status: COMPLETED | OUTPATIENT
Start: 2022-10-10 | End: 2022-10-15

## 2022-10-10 NOTE — H&P ADULT - PROBLEM SELECTOR PLAN 2
p/w SOB with YUAN x2d + LE edema, chronic   CXR noted to have b/l infiltrates with no evidence of consolidation, c/w pulmonary congestion   Elevated proBNP 1763 and trop 176.3, and no EKG changes   takes coreg 6.25mg BID and lasix 40mg BID at home, but has not taken Lasix x5d as she ran out   s/p Lasix 40mg IVP x1 and Decadron 6mg IVPx1, and started on remdesivir in the ED  c/w correg with holding parameters   c/w IV lasix 40mg, titrate based on volume status, response, and O2 titration   f/u repeat TTE as previous TTE 7/29/22 showed EF 55-60%, LVH, GIDD, mild pulmonary HTN  Admit to tele  f/u T2, trend trop until peak   daily weights, strict I&O  Cardio to be consulted

## 2022-10-10 NOTE — ED ADULT TRIAGE NOTE - CHIEF COMPLAINT QUOTE
send from clinic for SOB and left leg edema. patient c/o chronic arthritis pain right hip and shoulder.

## 2022-10-10 NOTE — H&P ADULT - PROBLEM SELECTOR PLAN 5
Noted to be COVID (+) in the ED, denies any active respiratory symptoms or sick contacts  Desaturating to 87% on RA, baseline 90-95% with a h/o COPD not on home o2  Started remdesivir x5d + Decadron x10 days  f/u COVID Ab - d/c remdesivir if positive  Obtain ambulatory O2 and document  CXR noted to have b/l infiltrates with no evidence of consolidation, c/w pulmonary congestion   obtain inflammatory markers(d-dimer, procal, LDH, ferritin, ESR, CRP)  Maintain O2 88-92% for COPD, o2 support titrate as needed. Noted to be COVID (+) in the ED, denies any active respiratory symptoms or sick contacts  Desaturating to 87% on RA, baseline 90-95% with a h/o COPD not on home o2  Started remdesivir x5d + Decadron x10 days  f/u COVID Ab - d/c remdesivir if positive  Obtain ambulatory O2 and document  CXR noted to have b/l infiltrates with no evidence of consolidation, c/w pulmonary congestion   obtain inflammatory markers(d-dimer, procal, LDH, ferritin, ESR, CRP)  Maintain O2 88-92% for COPD, o2 support titrate as needed.  ID: Dr. Denton

## 2022-10-10 NOTE — H&P ADULT - NSHPPHYSICALEXAM_GEN_ALL_CORE
Vital Signs Last 24 Hrs  T(C): 37.3 (10 Oct 2022 17:15), Max: 37.3 (10 Oct 2022 17:15)  T(F): 99.1 (10 Oct 2022 17:15), Max: 99.1 (10 Oct 2022 17:15)  HR: 77 (10 Oct 2022 17:15) (77 - 77)  BP: 159/79 (10 Oct 2022 17:15) (159/79 - 159/79)  BP(mean): --  RR: 20 (10 Oct 2022 17:15) (20 - 20)  SpO2: 84% (10 Oct 2022 17:15) (84% - 84%)    Parameters below as of 10 Oct 2022 17:15  Patient On (Oxygen Delivery Method): room air      GENERAL: NAD, lying in bed comfortably (+) obese  HEAD:  Atraumatic, Normocephalic  EYES: (+) left cloudy sclera, closed eyelid at baseline (+) right cloudy sclera  ENT: Moist mucous membranes  NECK: Supple, No JVD  CHEST/LUNG: (+) b/l crackles; Unlabored respirations (+) 3L NC  HEART: Regular rate and rhythm; No murmurs, rubs, or gallops  ABDOMEN: Bowel sounds present; Soft, Nontender, Nondistended.   EXTREMITIES:  (+) diminished peripheral pulses. No clubbing, cyanosis (+) b/l pitting edema,   NERVOUS SYSTEM:  (+) Alert & Oriented X1-2, speech clear. No deficits   MSK: (+) limited right LE d/t right hip pain   SKIN: No rashes or lesions Never smoker

## 2022-10-10 NOTE — H&P ADULT - NSICDXPASTMEDICALHX_GEN_ALL_CORE_FT
PAST MEDICAL HISTORY:  Arthritis     Atrial fibrillation     Breast cancer right, no chemo or radiation    COPD exacerbation     Deep vein thrombosis (DVT) Left Lower Extremity    HF (heart failure) HFpEF 7/29    HTN (hypertension)     Legally blind     Pre-diabetes

## 2022-10-10 NOTE — H&P ADULT - PROBLEM SELECTOR PLAN 3
p/w elevated potassium 6.3  no EKG changes   likely in the setting of CHF exacerbation d/t medication noncompliance  will give hyperkalemia cocktail  f/u repeat BMP   Monitor CMP, Mg, Phos daily

## 2022-10-10 NOTE — H&P ADULT - HISTORY OF PRESENT ILLNESS
89F coming from home, ambulates with RW, with PMHx of COPD (not oh home o2), HFpEF (7/29/22 EF 55-60%, LVH, GIDD, mild pulmonary HTN), Afib on Eliquis, HTN, and with remote h/o LLE DVT sent from PCP office for Hypoxia, with associated SOB and YUAN x2d un the setting of medication noncompliance. According to granddaughter, Macario [phone] the patient has had SOB with YUAN for the past few days, which she attributes to not taking her Lasix for 5 days because patient ran out. Per granddaughter, patient was admitted to [facility] in August for hypoxia and leg swelling, and treatment for heart failure, and discharged with an increased Lasix dose to 40mg BID. Per granddaughter, she ran out of Lasix after not going to her f/u PCP as the patient did not want . Denies chest pain, cough, and fever. Covid Vacc x3. 89F coming from home, ambulates with RW, with PMHx of COPD (not oh home o2), HFpEF (7/29/22 EF 55-60%, LVH, GIDD, mild pulmonary HTN), Afib on Eliquis, Breast CA, HTN, and with remote h/o LLE DVT sent from PCP office for Hypoxia, with associated SOB and YUAN x2d un the setting of medication noncompliance. According to granddaughter, Macario [phone] the patient has had SOB with YUAN for the past few days, which she attributes to not taking her Lasix for 5 days because patient ran out. Patient denies any associated chest pain or cough with SOB.  Patient also c/o chronic LE swelling, recently worsening. Also stating that she has right hip and right shoulder pain which she attributes to her arthritis. Per granddaughter, patient was admitted to [facility] in August for hypoxia and leg swelling, and treatment for heart failure, and discharged with an increased Lasix dose to 40mg BID. Per granddaughter, she ran out of Lasix after not going to her f/u PCP as the patient did not want want to go d/t clinical improvement. In the ED, patient found to be COVID positive, however denies any recent sick contact, as well as no fevers, chills, congestion and cough. Covid Vacc x3.  89F coming from home, ambulates with RW, with PMHx of COPD (not oh home o2), HFpEF (7/29/22 EF 55-60%, LVH, GIDD, mild pulmonary HTN), Afib on Eliquis, Breast CA, HTN, and with remote h/o LLE DVT sent from PCP office for Hypoxia, with associated SOB and YUAN x2d in the setting of medication noncompliance. According to granddaughter, Macario [phone] the patient has had SOB with YUAN for the past few days, which she attributes to not taking her Lasix for 5 days because patient ran out. Patient denies any associated chest pain or cough with SOB.  Patient also c/o chronic LE swelling, recently worsening. Also stating that she has right hip and right shoulder pain which she attributes to her arthritis. Per granddaughter, patient was admitted to [facility] in August for hypoxia and leg swelling, and treatment for heart failure, and discharged with an increased Lasix dose to 40mg BID. Per granddaughter, she ran out of Lasix after not going to her f/u PCP as the patient did not want want to go d/t clinical improvement. In the ED, patient found to be COVID positive, however denies any recent sick contact, as well as no fevers, chills, congestion and cough. Covid Vacc x3.

## 2022-10-10 NOTE — ED ADULT NURSE NOTE - NSIMPLEMENTINTERV_GEN_ALL_ED
Implemented All Fall with Harm Risk Interventions:  [place] to call system. Call bell, personal items and telephone within reach. Instruct patient to call for assistance. Room bathroom lighting operational. Non-slip footwear when patient is off stretcher. Physically safe environment: no spills, clutter or unnecessary equipment. Stretcher in lowest position, wheels locked, appropriate side rails in place. Provide visual cue, wrist band, yellow gown, etc. Monitor gait and stability. Monitor for mental status changes and reorient to person, place, and time. Review medications for side effects contributing to fall risk. Reinforce activity limits and safety measures with patient and family. Provide visual clues: red socks.

## 2022-10-10 NOTE — ED PROVIDER NOTE - CLINICAL SUMMARY MEDICAL DECISION MAKING FREE TEXT BOX
Differential: CHF exacerbation in setting, running out diaeretic, rule out dvt.  Plan: cbc/cmp, troponin, ekg, chest x-ray, ultrasound, iv lasix, call pmd, reassess.

## 2022-10-10 NOTE — H&P ADULT - PROBLEM SELECTOR PLAN 4
likely in the setting of CHF exacerbation in the setting of medication noncompliance   At baseline ambulates with RW  reports chronic LE swelling  LLE doppler negative DVT  c/o chronic right hip pain, denies fall or trauma   will start Tylenol prn for pain    Primary team to consider PT consult

## 2022-10-10 NOTE — H&P ADULT - PROBLEM SELECTOR PLAN 7
p/w SOB now requiring supp o2, likely d/t CHF exacerbation in the setting of medication noncompliance    h/o copd not on home o2, takes trelegy Ellipta and albuterol prn   not currently in exacerbation, no wheezing on exam  CXR noted to have b/l infiltrates with no evidence of consolidation, c/w pulmonary congestion   c/w symbicort, spiriva, albuterol prn  O2 support as needed, maintain O2 sat 88-92%, avoid over oxygenation

## 2022-10-10 NOTE — H&P ADULT - PROBLEM SELECTOR PLAN 9
h/o DVT in the Left LE diagnosed two years ago when she was found to have Afib  takes Eliquis at home, compliant with medication  on exam, chronic b/l pitting edema   Left LE doppler negative  for DVT  c/w Eliquis for AC

## 2022-10-10 NOTE — H&P ADULT - ASSESSMENT
89F coming from home, ambulates with RW, with PMHx of COPD (not oh home o2), HFpEF (7/29/22 EF 55-60%, LVH, GIDD, mild pulmonary HTN), Afib on Eliquis, Breast CA, HTN, and with remote h/o LLE DVT sent from PCP office for Hypoxia, with associated SOB and YUAN x2d. Patient admitted to telemetry for AHRF likely 2/2 CHF exacerbation in the setting of medication noncompliance.

## 2022-10-10 NOTE — H&P ADULT - PROBLEM SELECTOR PLAN 6
EKG shows nsr   CHADSVASC:  c/w carvedilol 6.25mg BID for rate control  Tele monitoring - Goal rate <110  f/u repeat TTE as previous TTE 7/29/22 showed EF 55-60%, LVH, GIDD, mild pulmonary HTN  Cardio to be Consulted

## 2022-10-10 NOTE — ED PROVIDER NOTE - OBJECTIVE STATEMENT
89 year old female with pmhx of copd, heart failure, afib on eliquis, htn, and blood clot in left leg presents with shortness of breath for 2 days.  Patient does not use oxygen at home. According daughter, patient was here last month with oxygen level 77, legs were swollen, and was in treatment for heart failure, admitted here for 5 days. Patient went PMD, and was told to come to ED for low oxygen level and shortness of breath on exertion. Oxygen level 82 this morning (usually 90-95 baseline). Patient was on 20 mg lasix, but was raised to 40 mg lasix twice a day after admitted here for heart failure. Patient ran out of lasix. Denies chest pain, cough, and fever. Covid Vacc x3.  Allergies: Losartan (angioedema)

## 2022-10-11 DIAGNOSIS — I50.33 ACUTE ON CHRONIC DIASTOLIC (CONGESTIVE) HEART FAILURE: ICD-10-CM

## 2022-10-11 DIAGNOSIS — U07.1 COVID-19: ICD-10-CM

## 2022-10-11 DIAGNOSIS — R09.89 OTHER SPECIFIED SYMPTOMS AND SIGNS INVOLVING THE CIRCULATORY AND RESPIRATORY SYSTEMS: ICD-10-CM

## 2022-10-11 DIAGNOSIS — J44.9 CHRONIC OBSTRUCTIVE PULMONARY DISEASE, UNSPECIFIED: ICD-10-CM

## 2022-10-11 DIAGNOSIS — E87.5 HYPERKALEMIA: ICD-10-CM

## 2022-10-11 DIAGNOSIS — J96.01 ACUTE RESPIRATORY FAILURE WITH HYPOXIA: ICD-10-CM

## 2022-10-11 DIAGNOSIS — Z29.9 ENCOUNTER FOR PROPHYLACTIC MEASURES, UNSPECIFIED: ICD-10-CM

## 2022-10-11 DIAGNOSIS — I10 ESSENTIAL (PRIMARY) HYPERTENSION: ICD-10-CM

## 2022-10-11 DIAGNOSIS — I82.409 ACUTE EMBOLISM AND THROMBOSIS OF UNSPECIFIED DEEP VEINS OF UNSPECIFIED LOWER EXTREMITY: ICD-10-CM

## 2022-10-11 DIAGNOSIS — R26.2 DIFFICULTY IN WALKING, NOT ELSEWHERE CLASSIFIED: ICD-10-CM

## 2022-10-11 DIAGNOSIS — I48.91 UNSPECIFIED ATRIAL FIBRILLATION: ICD-10-CM

## 2022-10-11 DIAGNOSIS — C50.919 MALIGNANT NEOPLASM OF UNSPECIFIED SITE OF UNSPECIFIED FEMALE BREAST: ICD-10-CM

## 2022-10-11 PROBLEM — J44.1 CHRONIC OBSTRUCTIVE PULMONARY DISEASE WITH (ACUTE) EXACERBATION: Chronic | Status: ACTIVE | Noted: 2022-07-28

## 2022-10-11 LAB
ALBUMIN SERPL ELPH-MCNC: 3.3 G/DL — LOW (ref 3.5–5)
ALP SERPL-CCNC: 138 U/L — HIGH (ref 40–120)
ALT FLD-CCNC: 21 U/L DA — SIGNIFICANT CHANGE UP (ref 10–60)
ANION GAP SERPL CALC-SCNC: 2 MMOL/L — LOW (ref 5–17)
ANION GAP SERPL CALC-SCNC: 7 MMOL/L — SIGNIFICANT CHANGE UP (ref 5–17)
APPEARANCE UR: CLEAR — SIGNIFICANT CHANGE UP
AST SERPL-CCNC: 48 U/L — HIGH (ref 10–40)
BASOPHILS # BLD AUTO: 0.02 K/UL — SIGNIFICANT CHANGE UP (ref 0–0.2)
BASOPHILS NFR BLD AUTO: 0.3 % — SIGNIFICANT CHANGE UP (ref 0–2)
BILIRUB SERPL-MCNC: 0.6 MG/DL — SIGNIFICANT CHANGE UP (ref 0.2–1.2)
BILIRUB UR-MCNC: NEGATIVE — SIGNIFICANT CHANGE UP
BUN SERPL-MCNC: 10 MG/DL — SIGNIFICANT CHANGE UP (ref 7–18)
BUN SERPL-MCNC: 17 MG/DL — SIGNIFICANT CHANGE UP (ref 7–18)
CALCIUM SERPL-MCNC: 8.3 MG/DL — LOW (ref 8.4–10.5)
CALCIUM SERPL-MCNC: 8.9 MG/DL — SIGNIFICANT CHANGE UP (ref 8.4–10.5)
CHLORIDE SERPL-SCNC: 96 MMOL/L — SIGNIFICANT CHANGE UP (ref 96–108)
CHLORIDE SERPL-SCNC: 98 MMOL/L — SIGNIFICANT CHANGE UP (ref 96–108)
CO2 SERPL-SCNC: 34 MMOL/L — HIGH (ref 22–31)
CO2 SERPL-SCNC: 37 MMOL/L — HIGH (ref 22–31)
COLOR SPEC: YELLOW — SIGNIFICANT CHANGE UP
COVID-19 NUCLEOCAPSID GAM AB INTERP: POSITIVE
COVID-19 NUCLEOCAPSID TOTAL GAM ANTIBODY RESULT: 157 INDEX — HIGH
COVID-19 SPIKE DOMAIN AB INTERP: POSITIVE
COVID-19 SPIKE DOMAIN ANTIBODY RESULT: >250 U/ML — HIGH
CREAT SERPL-MCNC: 0.91 MG/DL — SIGNIFICANT CHANGE UP (ref 0.5–1.3)
CREAT SERPL-MCNC: 0.96 MG/DL — SIGNIFICANT CHANGE UP (ref 0.5–1.3)
DIFF PNL FLD: ABNORMAL
EGFR: 56 ML/MIN/1.73M2 — LOW
EGFR: 60 ML/MIN/1.73M2 — SIGNIFICANT CHANGE UP
EOSINOPHIL # BLD AUTO: 0.02 K/UL — SIGNIFICANT CHANGE UP (ref 0–0.5)
EOSINOPHIL NFR BLD AUTO: 0.3 % — SIGNIFICANT CHANGE UP (ref 0–6)
GLUCOSE BLDC GLUCOMTR-MCNC: 142 MG/DL — HIGH (ref 70–99)
GLUCOSE BLDC GLUCOMTR-MCNC: 163 MG/DL — HIGH (ref 70–99)
GLUCOSE BLDC GLUCOMTR-MCNC: 164 MG/DL — HIGH (ref 70–99)
GLUCOSE SERPL-MCNC: 143 MG/DL — HIGH (ref 70–99)
GLUCOSE SERPL-MCNC: 225 MG/DL — HIGH (ref 70–99)
GLUCOSE UR QL: 50 MG/DL
HCT VFR BLD CALC: 44.2 % — SIGNIFICANT CHANGE UP (ref 34.5–45)
HGB BLD-MCNC: 14 G/DL — SIGNIFICANT CHANGE UP (ref 11.5–15.5)
IMM GRANULOCYTES NFR BLD AUTO: 0.3 % — SIGNIFICANT CHANGE UP (ref 0–0.9)
INR BLD: 1.46 RATIO — HIGH (ref 0.88–1.16)
KETONES UR-MCNC: NEGATIVE — SIGNIFICANT CHANGE UP
LEUKOCYTE ESTERASE UR-ACNC: NEGATIVE — SIGNIFICANT CHANGE UP
LYMPHOCYTES # BLD AUTO: 0.59 K/UL — LOW (ref 1–3.3)
LYMPHOCYTES # BLD AUTO: 9 % — LOW (ref 13–44)
MAGNESIUM SERPL-MCNC: 2.1 MG/DL — SIGNIFICANT CHANGE UP (ref 1.6–2.6)
MCHC RBC-ENTMCNC: 31.1 PG — SIGNIFICANT CHANGE UP (ref 27–34)
MCHC RBC-ENTMCNC: 31.7 GM/DL — LOW (ref 32–36)
MCV RBC AUTO: 98.2 FL — SIGNIFICANT CHANGE UP (ref 80–100)
MONOCYTES # BLD AUTO: 0.24 K/UL — SIGNIFICANT CHANGE UP (ref 0–0.9)
MONOCYTES NFR BLD AUTO: 3.7 % — SIGNIFICANT CHANGE UP (ref 2–14)
NEUTROPHILS # BLD AUTO: 5.64 K/UL — SIGNIFICANT CHANGE UP (ref 1.8–7.4)
NEUTROPHILS NFR BLD AUTO: 86.4 % — HIGH (ref 43–77)
NITRITE UR-MCNC: NEGATIVE — SIGNIFICANT CHANGE UP
NRBC # BLD: 0 /100 WBCS — SIGNIFICANT CHANGE UP (ref 0–0)
PH UR: 5 — SIGNIFICANT CHANGE UP (ref 5–8)
PHOSPHATE SERPL-MCNC: 4.2 MG/DL — SIGNIFICANT CHANGE UP (ref 2.5–4.5)
PLATELET # BLD AUTO: 226 K/UL — SIGNIFICANT CHANGE UP (ref 150–400)
POTASSIUM SERPL-MCNC: 4.5 MMOL/L — SIGNIFICANT CHANGE UP (ref 3.5–5.3)
POTASSIUM SERPL-MCNC: 5.6 MMOL/L — HIGH (ref 3.5–5.3)
POTASSIUM SERPL-SCNC: 4.5 MMOL/L — SIGNIFICANT CHANGE UP (ref 3.5–5.3)
POTASSIUM SERPL-SCNC: 5.6 MMOL/L — HIGH (ref 3.5–5.3)
PROT SERPL-MCNC: 7.8 G/DL — SIGNIFICANT CHANGE UP (ref 6–8.3)
PROT UR-MCNC: NEGATIVE — SIGNIFICANT CHANGE UP
PROTHROM AB SERPL-ACNC: 17.5 SEC — HIGH (ref 10.5–13.4)
RBC # BLD: 4.5 M/UL — SIGNIFICANT CHANGE UP (ref 3.8–5.2)
RBC # FLD: 13.6 % — SIGNIFICANT CHANGE UP (ref 10.3–14.5)
SARS-COV-2 IGG+IGM SERPL QL IA: 157 INDEX — HIGH
SARS-COV-2 IGG+IGM SERPL QL IA: >250 U/ML — HIGH
SARS-COV-2 IGG+IGM SERPL QL IA: POSITIVE
SARS-COV-2 IGG+IGM SERPL QL IA: POSITIVE
SODIUM SERPL-SCNC: 137 MMOL/L — SIGNIFICANT CHANGE UP (ref 135–145)
SODIUM SERPL-SCNC: 137 MMOL/L — SIGNIFICANT CHANGE UP (ref 135–145)
SP GR SPEC: 1 — LOW (ref 1.01–1.02)
TROPONIN I, HIGH SENSITIVITY RESULT: 150.2 NG/L — HIGH
UROBILINOGEN FLD QL: NEGATIVE — SIGNIFICANT CHANGE UP
WBC # BLD: 6.53 K/UL — SIGNIFICANT CHANGE UP (ref 3.8–10.5)
WBC # FLD AUTO: 6.53 K/UL — SIGNIFICANT CHANGE UP (ref 3.8–10.5)

## 2022-10-11 RX ORDER — APIXABAN 2.5 MG/1
5 TABLET, FILM COATED ORAL EVERY 12 HOURS
Refills: 0 | Status: DISCONTINUED | OUTPATIENT
Start: 2022-10-11 | End: 2022-10-20

## 2022-10-11 RX ORDER — ANASTROZOLE 1 MG/1
1 TABLET ORAL DAILY
Refills: 0 | Status: DISCONTINUED | OUTPATIENT
Start: 2022-10-11 | End: 2022-10-20

## 2022-10-11 RX ORDER — DEXAMETHASONE 0.5 MG/5ML
6 ELIXIR ORAL DAILY
Refills: 0 | Status: DISCONTINUED | OUTPATIENT
Start: 2022-10-11 | End: 2022-10-11

## 2022-10-11 RX ORDER — SODIUM ZIRCONIUM CYCLOSILICATE 10 G/10G
5 POWDER, FOR SUSPENSION ORAL ONCE
Refills: 0 | Status: COMPLETED | OUTPATIENT
Start: 2022-10-11 | End: 2022-10-11

## 2022-10-11 RX ORDER — FUROSEMIDE 40 MG
40 TABLET ORAL EVERY 12 HOURS
Refills: 0 | Status: DISCONTINUED | OUTPATIENT
Start: 2022-10-11 | End: 2022-10-14

## 2022-10-11 RX ORDER — DEXTROSE 50 % IN WATER 50 %
50 SYRINGE (ML) INTRAVENOUS ONCE
Refills: 0 | Status: COMPLETED | OUTPATIENT
Start: 2022-10-11 | End: 2022-10-11

## 2022-10-11 RX ORDER — INSULIN HUMAN 100 [IU]/ML
10 INJECTION, SOLUTION SUBCUTANEOUS ONCE
Refills: 0 | Status: COMPLETED | OUTPATIENT
Start: 2022-10-11 | End: 2022-10-11

## 2022-10-11 RX ORDER — ALBUTEROL 90 UG/1
1 AEROSOL, METERED ORAL EVERY 6 HOURS
Refills: 0 | Status: DISCONTINUED | OUTPATIENT
Start: 2022-10-11 | End: 2022-10-20

## 2022-10-11 RX ORDER — DEXAMETHASONE 0.5 MG/5ML
6 ELIXIR ORAL DAILY
Refills: 0 | Status: DISCONTINUED | OUTPATIENT
Start: 2022-10-11 | End: 2022-10-14

## 2022-10-11 RX ORDER — REMDESIVIR 5 MG/ML
100 INJECTION INTRAVENOUS EVERY 24 HOURS
Refills: 0 | Status: COMPLETED | OUTPATIENT
Start: 2022-10-12 | End: 2022-10-15

## 2022-10-11 RX ORDER — MONTELUKAST 4 MG/1
10 TABLET, CHEWABLE ORAL DAILY
Refills: 0 | Status: DISCONTINUED | OUTPATIENT
Start: 2022-10-11 | End: 2022-10-20

## 2022-10-11 RX ORDER — FERROUS SULFATE 325(65) MG
325 TABLET ORAL DAILY
Refills: 0 | Status: DISCONTINUED | OUTPATIENT
Start: 2022-10-11 | End: 2022-10-20

## 2022-10-11 RX ORDER — SIMVASTATIN 20 MG/1
20 TABLET, FILM COATED ORAL AT BEDTIME
Refills: 0 | Status: DISCONTINUED | OUTPATIENT
Start: 2022-10-11 | End: 2022-10-20

## 2022-10-11 RX ORDER — ACETAMINOPHEN 500 MG
650 TABLET ORAL EVERY 6 HOURS
Refills: 0 | Status: DISCONTINUED | OUTPATIENT
Start: 2022-10-11 | End: 2022-10-20

## 2022-10-11 RX ORDER — ASPIRIN/CALCIUM CARB/MAGNESIUM 324 MG
81 TABLET ORAL DAILY
Refills: 0 | Status: DISCONTINUED | OUTPATIENT
Start: 2022-10-11 | End: 2022-10-20

## 2022-10-11 RX ORDER — LANOLIN ALCOHOL/MO/W.PET/CERES
3 CREAM (GRAM) TOPICAL AT BEDTIME
Refills: 0 | Status: DISCONTINUED | OUTPATIENT
Start: 2022-10-11 | End: 2022-10-20

## 2022-10-11 RX ORDER — ALBUTEROL 90 UG/1
1 AEROSOL, METERED ORAL
Qty: 0 | Refills: 0 | DISCHARGE

## 2022-10-11 RX ORDER — CARVEDILOL PHOSPHATE 80 MG/1
6.25 CAPSULE, EXTENDED RELEASE ORAL EVERY 12 HOURS
Refills: 0 | Status: DISCONTINUED | OUTPATIENT
Start: 2022-10-11 | End: 2022-10-20

## 2022-10-11 RX ADMIN — Medication 6 MILLIGRAM(S): at 12:35

## 2022-10-11 RX ADMIN — MONTELUKAST 10 MILLIGRAM(S): 4 TABLET, CHEWABLE ORAL at 12:34

## 2022-10-11 RX ADMIN — Medication 1 DROP(S): at 05:49

## 2022-10-11 RX ADMIN — INSULIN HUMAN 10 UNIT(S): 100 INJECTION, SOLUTION SUBCUTANEOUS at 02:19

## 2022-10-11 RX ADMIN — CARVEDILOL PHOSPHATE 6.25 MILLIGRAM(S): 80 CAPSULE, EXTENDED RELEASE ORAL at 05:50

## 2022-10-11 RX ADMIN — Medication 1 DROP(S): at 12:35

## 2022-10-11 RX ADMIN — Medication 50 MILLILITER(S): at 02:19

## 2022-10-11 RX ADMIN — Medication 325 MILLIGRAM(S): at 12:34

## 2022-10-11 RX ADMIN — APIXABAN 5 MILLIGRAM(S): 2.5 TABLET, FILM COATED ORAL at 17:51

## 2022-10-11 RX ADMIN — SIMVASTATIN 20 MILLIGRAM(S): 20 TABLET, FILM COATED ORAL at 21:48

## 2022-10-11 RX ADMIN — ANASTROZOLE 1 MILLIGRAM(S): 1 TABLET ORAL at 12:35

## 2022-10-11 RX ADMIN — CARVEDILOL PHOSPHATE 6.25 MILLIGRAM(S): 80 CAPSULE, EXTENDED RELEASE ORAL at 17:51

## 2022-10-11 RX ADMIN — Medication 40 MILLIGRAM(S): at 17:51

## 2022-10-11 RX ADMIN — SODIUM ZIRCONIUM CYCLOSILICATE 5 GRAM(S): 10 POWDER, FOR SUSPENSION ORAL at 02:19

## 2022-10-11 RX ADMIN — Medication 40 MILLIGRAM(S): at 00:14

## 2022-10-11 RX ADMIN — Medication 1 DROP(S): at 17:50

## 2022-10-11 RX ADMIN — REMDESIVIR 500 MILLIGRAM(S): 5 INJECTION INTRAVENOUS at 00:13

## 2022-10-11 RX ADMIN — APIXABAN 5 MILLIGRAM(S): 2.5 TABLET, FILM COATED ORAL at 05:49

## 2022-10-11 RX ADMIN — Medication 81 MILLIGRAM(S): at 12:34

## 2022-10-11 RX ADMIN — Medication 6 MILLIGRAM(S): at 00:14

## 2022-10-11 NOTE — PROGRESS NOTE ADULT - PROBLEM SELECTOR PLAN 10
-  Patient has a h/o breast CA with chemotherapy / radiation  -  Continue with anastrazole 1mg daily

## 2022-10-11 NOTE — PROGRESS NOTE ADULT - PROBLEM SELECTOR PLAN 7
-  Presented with SOB now requiring supp o2, likely due to CHF exacerbation in the setting of medication noncompliance    -  History of COPD,  not on home 02, takes trelegy Ellipta and albuterol prn   -  Not currently in exacerbation, no wheezing on exam  -  CXR noted to have b/l infiltrates with no evidence of consolidation, consistent with pulmonary congestion   c/w symbicort, spiriva, albuterol prn  O2 support as needed, maintain O2 sat 88-92%, avoid over oxygenation -  Presented with SOB now requiring supp o2, likely due to CHF exacerbation in the setting of medication noncompliance    -  History of COPD,  not on home 02, takes trelegy Ellipta and albuterol prn   -  Not currently in exacerbation, no wheezing on exam  -  CXR noted to have b/l infiltrates with no evidence of consolidation, consistent with pulmonary congestion   -  Continue with  symbicort, spiriva and albuterol prn  -  O2 support as needed, maintain O2 sat 88-92%, avoid over oxygenation -  Presented with SOB now requiring supplemental 02, likely due to CHF exacerbation in the setting of medication noncompliance    -  History of COPD,  not on home 02, takes trelegy Ellipta and albuterol prn   -  Not currently in exacerbation, no wheezing on exam  -  CXR noted to have b/l infiltrates with no evidence of consolidation, consistent with pulmonary congestion   -  Continue with  symbicort, spiriva and albuterol prn  -  O2 support as needed, maintain O2 sat 88-92%, avoid over oxygenation

## 2022-10-11 NOTE — PROGRESS NOTE ADULT - PROBLEM SELECTOR PLAN 1
sent form PCP office for Hypoxia in the setting of SOB on exertion and b/l pitting edema, chronic   likely 2/2  CHF exacerbation in the setting of medication noncompliance; h/o HFpEFF, proBNP 1763 and trop 176.3  DDx  CHF exacerbation vs COVID infection vs DVT, less likely COPD as patient is not wheezing   SpO2 90-95% at baseline, now 84 % on RA in the ED  Noted to become dyspneic on exertion  CXR noted to have b/l infiltrates with no evidence of consolidation, c/w pulmonary congestion   LLE Doppler negative for DVT   s/p Lasix 40mg IVP x1 and Decadron 6mg IVPx1, and started on remdesivir in the ED  c/w Lasix 40mg IV BID x1d, titrate based on volume status, response,and O2 titration   c/w home COPD meds   c/w Supp O2, titrate as tolerated   f/u repeat TTE as previous TTE 7/29/22 showed EF 55-60%, LVH, GIDD, mild pulmonary HTN  Cardio: Corrie   Pulm: Dr. Son -  Patient was sent from PCP office for Hypoxia in the setting of SOB on exertion and b/l pitting edema,  -  Has known history of HFpEF  -  likely 2/2  CHF exacerbation in the setting of medication noncompliance;   -  ProBNP 1763   -  Trop 168  -  SpO2  84 % on RA in the ED  -  Noted to become dyspneic on exertion  -  CXR noted to have b/l infiltrates with no evidence of consolidation,  -  findings consistent with pulmonary congestion   -  LLE Doppler negative for DVT   -  s/p Lasix 40mg IVP x1 and Decadron 6mg IVPx1, and started on remdesivir in the ED  -   Continue with Lasix 40mg IV BID   -   Continue with  home COPD meds   -   f/u repeat TTE as previous TTE 7/29/22 showed EF 55-60%, LVH, GIDD, mild pulmonary HTN  -  Cardiology : Dr Denton   -  Pulmonary : Dr. Son -  Patient was sent from PCP office for Hypoxia in the setting of SOB on exertion and b/l pitting edema,  -  Has known history of HFpEF  -  likely secondary to CHF exacerbation in the setting of medication noncompliance;   -  ProBNP 1763   -  Trop 168  -  SpO2  84 % on RA in the ED  -  Noted to become dyspneic on exertion  -  Chest xray noted to have b/l infiltrates with no evidence of consolidation,  -  findings consistent with pulmonary congestion   -  LLE Doppler negative for DVT   -  s/p Lasix 40mg IVP x1 and Decadron 6mg IVPx1, and started on remdesivir in the ED  -   Continue with Lasix 40mg IV BID   -   Continue with  home COPD meds   -   f/u repeat TTE as previous TTE 7/29/22 showed EF 55-60%, LVH, GIDD, mild pulmonary HTN  -  Cardiology : Dr Denton   -  Pulmonary : Dr. Son

## 2022-10-11 NOTE — PROGRESS NOTE ADULT - PROBLEM SELECTOR PLAN 9
-  Patient has a history of LLE DVT  -  Diagnosed two years ago with A-fib diagnosis  -  Continue with Eliquis   -  US of LLE negative for DVT

## 2022-10-11 NOTE — CONSULT NOTE ADULT - SUBJECTIVE AND OBJECTIVE BOX
Patient is a 89y  old female  coming from home, ambulates with RW, with PMHx of COPD (not oh home o2), HFpEF (22 EF 55-60%, LVH, GIDD, mild pulmonary HTN), Afib on Eliquis, Breast CA, HTN, and with remote h/o LLE DVT sent from PCP office for evaluation of Hypoxia, with associated SOB and YUAN x2d in the setting of medication noncompliance. According to granddaughter, Macario [phone] had SOB with YUAN for the past few days, which she attributes to not taking her Lasix for 5 days because patient ran out  Patient also c/o chronic LE swelling, recently worsening. Also stating that she has right hip and right shoulder pain which she attributes to her arthritis. Per granddaughter, patient was admitted to [facility] in August for hypoxia and leg swelling, and treatment for heart failure, and discharged with an increased Lasix dose to 40mg BID. Per granddaughter, she ran out of Lasix after not going to her f/u PCP as the patient did not want want to go d/t clinical improvement. In the ED, patient found to be COVID positive, however denies any recent sick contact, as well as no fevers, chills, congestion and cough. Covid Vacc x3.         REVIEW OF SYSTEMS: Total of twelve systems have been reviewed and found to be negative unless mentioned in HPI        PAST MEDICAL & SURGICAL HISTORY:  Arthritis  Legally blind  Pre-diabetes  Breast cancer  right, no chemo or radiation  COPD exacerbation  Atrial fibrillation  HF (heart failure)  HFpEF   HTN (hypertension)  Deep vein thrombosis (DVT)  Left Lower Extremity  No significant past surgical history      SOCIAL HISTORY  Alcohol: Does not drink  Tobacco: Does not smoke  Illicit substance use: None      FAMILY HISTORY: Non contributory to the present illness        ALLERGIES: Chicken (Stomach Upset)  losartan (Angioedema)      Vital Signs Last 24 Hrs  T(C): 36 (11 Oct 2022 13:06), Max: 37.5 (11 Oct 2022 03:54)  T(F): 96.8 (11 Oct 2022 13:06), Max: 99.5 (11 Oct 2022 03:54)  HR: 65 (11 Oct 2022 13:06) (65 - 81)  BP: 112/65 (11 Oct 2022 13:06) (112/65 - 159/79)  BP(mean): --  RR: 18 (11 Oct 2022 13:06) (18 - 20)  SpO2: 100% (11 Oct 2022 13:06) (84% - 100%)    Parameters below as of 11 Oct 2022 13:06  Patient On (Oxygen Delivery Method): nasal cannula  O2 Flow (L/min): 3      PHYSICAL EXAM:  GENERAL: Not in distress   CHEST/LUNG: Not using accessory muscles   HEART: s1 and s2 present  CNS: Awake and Alert      LABS:                        14.0   6.53  )-----------( 226      ( 11 Oct 2022 05:01 )             44.2       10-11    137  |  96  |  10  ----------------------------<  143<H>  5.6<H>   |  34<H>  |  0.96    Ca    8.9      11 Oct 2022 05:01  Phos  4.2     10-11  Mg     2.1     10-11    TPro  7.8  /  Alb  3.3<L>  /  TBili  0.6  /  DBili  x   /  AST  48<H>  /  ALT  21  /  AlkPhos  138<H>  10-11    PT/INR - ( 11 Oct 2022 05:01 )   PT: 17.5 sec;   INR: 1.46 ratio      PTT - ( 10 Oct 2022 19:00 )  PTT:41.1 sec        CAPILLARY BLOOD GLUCOSE  POCT Blood Glucose.: 164 mg/dL (11 Oct 2022 12:06)  POCT Blood Glucose.: 163 mg/dL (11 Oct 2022 08:27)  POCT Blood Glucose.: 142 mg/dL (11 Oct 2022 02:15)        Urinalysis Basic - ( 11 Oct 2022 06:05 )  Color: Yellow / Appearance: Clear / S.005 / pH: x  Gluc: x / Ketone: Negative  / Bili: Negative / Urobili: Negative   Blood: x / Protein: Negative / Nitrite: Negative   Leuk Esterase: Negative / RBC: 0-2 /HPF / WBC 3-5 /HPF   Sq Epi: x / Non Sq Epi: Few /HPF / Bacteria: Trace /HPF        MEDICATIONS  (STANDING):  anastrozole 1 milliGRAM(s) Oral daily  apixaban 5 milliGRAM(s) Oral every 12 hours  artificial  tears Solution 1 Drop(s) Both EYES four times a day  aspirin  chewable 81 milliGRAM(s) Oral daily  carvedilol 6.25 milliGRAM(s) Oral every 12 hours  dexAMETHasone  Injectable 6 milliGRAM(s) IV Push daily  ferrous    sulfate 325 milliGRAM(s) Oral daily  furosemide   Injectable 40 milliGRAM(s) IV Push every 12 hours  montelukast 10 milliGRAM(s) Oral daily  remdesivir  IVPB   IV Intermittent   simvastatin 20 milliGRAM(s) Oral at bedtime    MEDICATIONS  (PRN):  acetaminophen     Tablet .. 650 milliGRAM(s) Oral every 6 hours PRN Temp greater or equal to 38C (100.4F), Moderate Pain (4 - 6)  ALBUTerol    90 MICROgram(s) HFA Inhaler 1 Puff(s) Inhalation every 6 hours PRN Shortness of Breath and/or Wheezing  melatonin 3 milliGRAM(s) Oral at bedtime PRN Insomnia          RADIOLOGY & ADDITIONAL TESTS:               Patient is a 89y  old female  coming from home, with PMHx of COPD (not oh home o2), HFpEF (22 EF 55-60%, LVH, GIDD, mild pulmonary HTN), Afib on Eliquis, Breast CA, HTN, and with remote h/o LLE DVT sent from PCP office for evaluation of Hypoxia, with associated SOB and YUAN x2d in the setting of medication noncompliance.  Patient has not taking her Lasix for last 5 days because she ran out  of Lasix. Per granddaughter, patient was admitted to [facility] in August for hypoxia and leg swelling, and treatment for heart failure, and discharged with an increased Lasix dose to 40mg BID.  On admission, she found to have no fever, but tachypnea, hypoxia to 84 % in Room air and positive COVID PCR. She has started on Remdesivir and Dexamethasone, and the ID consult requested to assist with further evaluation and antibiotic management.      REVIEW OF SYSTEMS: As per  HPI      PAST MEDICAL & SURGICAL HISTORY:  Arthritis  Legally blind  Pre-diabetes  Breast cancer  right, no chemo or radiation  COPD exacerbation  Atrial fibrillation  HF (heart failure)  HFpEF   HTN (hypertension)  Deep vein thrombosis (DVT)  Left Lower Extremity  No significant past surgical history      SOCIAL HISTORY  Alcohol: Does not drink  Tobacco: Does not smoke  Illicit substance use: None      FAMILY HISTORY: Non contributory to the present illness        ALLERGIES: Chicken (Stomach Upset)  losartan (Angioedema)      Vital Signs Last 24 Hrs  T(C): 36 (11 Oct 2022 13:06), Max: 37.5 (11 Oct 2022 03:54)  T(F): 96.8 (11 Oct 2022 13:06), Max: 99.5 (11 Oct 2022 03:54)  HR: 65 (11 Oct 2022 13:06) (65 - 81)  BP: 112/65 (11 Oct 2022 13:06) (112/65 - 159/79)  BP(mean): --  RR: 18 (11 Oct 2022 13:06) (18 - 20)  SpO2: 100% (11 Oct 2022 13:06) (84% - 100%)    Parameters below as of 11 Oct 2022 13:06  Patient On (Oxygen Delivery Method): nasal cannula  O2 Flow (L/min): 3      PHYSICAL EXAM:  GENERAL: Not in distress   CHEST/LUNG: Not using accessory muscles   CNS: Awake and Alert  - Please refer to Primary team's note for rest of the systems      LABS:                        14.0   6.53  )-----------( 226      ( 11 Oct 2022 05:01 )             44.2       10-11    137  |  96  |  10  ----------------------------<  143<H>  5.6<H>   |  34<H>  |  0.96    Ca    8.9      11 Oct 2022 05:01  Phos  4.2     10-11  Mg     2.1     10-11    TPro  7.8  /  Alb  3.3<L>  /  TBili  0.6  /  DBili  x   /  AST  48<H>  /  ALT  21  /  AlkPhos  138<H>  10-11    PT/INR - ( 11 Oct 2022 05:01 )   PT: 17.5 sec;   INR: 1.46 ratio      PTT - ( 10 Oct 2022 19:00 )  PTT:41.1 sec        CAPILLARY BLOOD GLUCOSE  POCT Blood Glucose.: 164 mg/dL (11 Oct 2022 12:06)  POCT Blood Glucose.: 163 mg/dL (11 Oct 2022 08:27)  POCT Blood Glucose.: 142 mg/dL (11 Oct 2022 02:15)        Urinalysis Basic - ( 11 Oct 2022 06:05 )  Color: Yellow / Appearance: Clear / S.005 / pH: x  Gluc: x / Ketone: Negative  / Bili: Negative / Urobili: Negative   Blood: x / Protein: Negative / Nitrite: Negative   Leuk Esterase: Negative / RBC: 0-2 /HPF / WBC 3-5 /HPF   Sq Epi: x / Non Sq Epi: Few /HPF / Bacteria: Trace /HPF        MEDICATIONS  (STANDING):  anastrozole 1 milliGRAM(s) Oral daily  apixaban 5 milliGRAM(s) Oral every 12 hours  artificial  tears Solution 1 Drop(s) Both EYES four times a day  aspirin  chewable 81 milliGRAM(s) Oral daily  carvedilol 6.25 milliGRAM(s) Oral every 12 hours  dexAMETHasone  Injectable 6 milliGRAM(s) IV Push daily  ferrous    sulfate 325 milliGRAM(s) Oral daily  furosemide   Injectable 40 milliGRAM(s) IV Push every 12 hours  montelukast 10 milliGRAM(s) Oral daily  remdesivir  IVPB   IV Intermittent   simvastatin 20 milliGRAM(s) Oral at bedtime    MEDICATIONS  (PRN):  acetaminophen     Tablet .. 650 milliGRAM(s) Oral every 6 hours PRN Temp greater or equal to 38C (100.4F), Moderate Pain (4 - 6)  ALBUTerol    90 MICROgram(s) HFA Inhaler 1 Puff(s) Inhalation every 6 hours PRN Shortness of Breath and/or Wheezing  melatonin 3 milliGRAM(s) Oral at bedtime PRN Insomnia        RADIOLOGY & ADDITIONAL TESTS:    10/10/22 : Xray Chest 1 View AP/PA (10.10.22 @ 18:43) IMPRESSION:   No radiographic evidence of active chest disease.      10/10/22 : US Duplex Venous Lower Ext Ltd, Left (10.10.22 @ 18:42) Limited study with limited compression secondary to swelling.  No obvious left lower extremity deep venous thrombosis.      MICROBIOLOGY DATA:    Flu With COVID-19 By PATRICK (10.10.22 @ 17:50)   SARS-CoV-2 Result: Detected:

## 2022-10-11 NOTE — PROGRESS NOTE ADULT - PROBLEM SELECTOR PLAN 8
-   Patient has a history of  Hypertension  -   on coreg 6.25 and lasix 40mg daily at home   -   Continue with home meds with holding parameters   -   Monitor BP routinely

## 2022-10-11 NOTE — PROGRESS NOTE ADULT - PROBLEM SELECTOR PLAN 3
p/w elevated potassium 6.3  no EKG changes   likely in the setting of CHF exacerbation d/t medication noncompliance  will give hyperkalemia cocktail  f/u repeat BMP   Monitor CMP, Mg, Phos daily -   Elevated potassium of  6.3 on admission  -   No EKG changes noted  -  likely in the setting of CHF exacerbation d/t medication noncompliance  -   hyperkalemia cocktail  -  Repeat potassium this morning 5.6  -  Repeat pending -   Elevated potassium of  6.3 on admission  -   No EKG changes noted  -  likely in the setting of CHF exacerbation d/t medication noncompliance  -   hyperkalemia cocktail  -  Continue with Lasix twice daily  -  Repeat potassium this morning 5.6  -  Repeat pending

## 2022-10-11 NOTE — PATIENT PROFILE ADULT - FALL HARM RISK - HARM RISK INTERVENTIONS
Assistance with ambulation/Assistance OOB with selected safe patient handling equipment/Communicate Risk of Fall with Harm to all staff/Discuss with provider need for PT consult/Monitor gait and stability/Reinforce activity limits and safety measures with patient and family/Tailored Fall Risk Interventions/Visual Cue: Yellow wristband and red socks/Bed in lowest position, wheels locked, appropriate side rails in place/Call bell, personal items and telephone in reach/Instruct patient to call for assistance before getting out of bed or chair/Non-slip footwear when patient is out of bed/[place] to call system/Physically safe environment - no spills, clutter or unnecessary equipment/Purposeful Proactive Rounding/Room/bathroom lighting operational, light cord in reach

## 2022-10-11 NOTE — CONSULT NOTE ADULT - PROBLEM SELECTOR PROBLEM 2
[FreeTextEntry1] : Pt comes in follow up hematuria. Pt currently on finasteride started on 10/8/20. Pt also on flomax. Pt feels well and has no urinary complaints at this time.   2019 novel coronavirus disease (COVID-19)

## 2022-10-11 NOTE — CONSULT NOTE ADULT - PROBLEM SELECTOR RECOMMENDATION 9
likely secondary to Covid infection, Copd and decompensated CHF  oxygen supp  monitor oxygen sat  monitor resp status

## 2022-10-11 NOTE — CONSULT NOTE ADULT - PROBLEM SELECTOR RECOMMENDATION 2
isolation precautions  oxygen supp  monitor oxygen sat  Check LDH, LFTs, CRP, Ferritin, D-Dimer and procalcitonin  Follow up Covid PCR  follow up CXR  IV DEXA  IV Remdesevir

## 2022-10-11 NOTE — CONSULT NOTE ADULT - TIME BILLING
- Review of records, telemetry, vital signs and daily labs.   - General and cardiovascular physical examination.  - Generation of cardiovascular treatment plan.  - Coordination of care.      Patient was seen and examined by me on 10/11/2022,interim events noted,labs and radiology studies reviewed.  Chaparro Denton MD,FACC.  [address] [place], [place] [id number].  [phone]

## 2022-10-11 NOTE — CONSULT NOTE ADULT - ASSESSMENT
89F coming from home, ambulates with RW, with PMHx of COPD (not oh home o2), HFpEF (7/29/22 EF 55-60%, LVH, GIDD, mild pulmonary HTN), Afib on Eliquis, Breast CA, HTN, and with remote h/o LLE DVT sent from PCP office for Hypoxia, with associated SOB and YUAN x2d. Patient admitted to telemetry for AHRF likely 2/2 CHF exacerbation in the setting of medication noncompliance.           Problem/Plan - 1:  ·  Problem: Acute respiratory failure with hypoxia.   ·  Plan: -  Patient was sent from PCP office for Hypoxia in the setting of SOB on exertion and b/l pitting edema,  -  Has known history of HFpEF  -  likely secondary to CHF exacerbation in the setting of medication noncompliance;   -  ProBNP 1763   -  Trop 168  -  SpO2  84 % on RA in the ED  -  Noted to become dyspneic on exertion  -  Chest xray noted to have b/l infiltrates with no evidence of consolidation,  -  findings consistent with pulmonary congestion   -  LLE Doppler negative for DVT       Problem/Plan - 2:  ·  Problem: Acute on chronic diastolic congestive heart failure.   ·  Plan: -   Elevated ProBNP 1763   -  Trop 168, and no EKG changes   -  Takes coreg 6.25mg BID and lasix 40mg BID at home, but has not taken Lasix x5d as she ran out   -  s/p Lasix 40mg IVP x1 and Decadron 6mg IVP x 1,   -  Continue with coreg with holding parameters   -  Continue with  IV lasix 40mg, titrate based on volume status, response, and O2 titration   -   Repeat TTE as previous TTE 7/29/22 showed EF 55-60%, LVH, GIDD, mild pulmonary HTN      Problem/Plan - 3:  ·  Problem: Hyperkalemia.   ·  Plan: -   Elevated potassium of  6.3 on admission  -   No EKG changes noted  -  likely in the setting of CHF exacerbation d/t medication noncompliance  -   hyperkalemia cocktail  -  Continue with Lasix twice daily        Problem/Plan - 5:  ·  Problem: 2019 novel coronavirus disease (COVID-19).   ·  Plan: -  Noted to be COVID (+) in the ED, denies any active respiratory symptoms or sick contacts  -  Desaturating to 87% on RA, baseline 90-95% with a h/o COPD not on home o2  -  Started on remdesivir x 5 days and Decadron x10 days  -  f/u COVID Ab - d/c remdesivir if positive  -  Obtain ambulatory O2 and document  -  CXR noted to have b/l infiltrates with no evidence of consolidation,   -  Obtain inflammatory markers(d-dimer, procalcitonin, LDH, ferritin, ESR, CRP)  -  Maintain O2 88-92% for COPD, o2 support titrate as needed.  -  ID: Dr. Denton.    Problem/Plan - 6:  ·  Problem: Afib.   ·  Plan: -  NSR on ECG  -  Continue with telemetry monitoring  -  Continue with  carvedilol 6.25mg BID for rate control  -  Continue with Eliquis for anticoagulation  -  Follow  repeat TTE as previous TTE 7/29/22 showed EF 55-60%, LVH, GIDD, mild pulmonary HTN  -  Cardiology consulted:  Dr Denton notified.    Problem/Plan - 7:  ·  Problem: COPD (chronic obstructive pulmonary disease).   ·  Plan: -  Presented with SOB now requiring supplemental 02, likely due to CHF exacerbation in the setting of medication noncompliance    -  History of COPD,  not on home 02, takes trelegy Ellipta and albuterol prn   -  Not currently in exacerbation, no wheezing on exam  -  CXR noted to have b/l infiltrates with no evidence of consolidation, consistent with pulmonary congestion   -  Continue with  symbicort, spiriva and albuterol prn  -  O2 support as needed, maintain O2 sat 88-92%, avoid over oxygenation.    Problem/Plan - 8:  ·  Problem: HTN (hypertension).   ·  Plan: -   Patient has a history of  Hypertension  -   on coreg 6.25 and lasix 40mg daily at home   -   Continue with home meds with holding parameters   -   Monitor BP routinely.    Problem/Plan - 9:  ·  Problem: Deep vein thrombosis (DVT).   ·  Plan: -  Patient has a history of LLE DVT  -  Diagnosed two years ago with A-fib diagnosis  -  Continue with Eliquis   -  US of LLE negative for DVT.    Problem/Plan - 10:  ·  Problem: Breast cancer.   ·  Plan; -  Patient has a h/o breast CA with chemotherapy / radiation  -  Continue with anastrazole 1mg daily.  
Patient is a 89y  old female  coming from home, with PMHx of COPD (not oh home o2), HFpEF (7/29/22 EF 55-60%, LVH, GIDD, mild pulmonary HTN), Afib on Eliquis, Breast CA, HTN, and with remote h/o LLE DVT sent from PCP office for evaluation of Hypoxia, with associated SOB and YUAN x2d in the setting of medication noncompliance.  Patient has not taking her Lasix for last 5 days because she ran out  of Lasix. Per granddaughter, patient was admitted to [facility] in August for hypoxia and leg swelling, and treatment for heart failure, and discharged with an increased Lasix dose to 40mg BID.  On admission, she found to have no fever, but tachypnea, hypoxia to 84 % in Room air and positive COVID PCR. She has started on Remdesivir and Dexamethasone, and the ID consult requested to assist with further evaluation and antibiotic management.    # COVID Pneumonitis  # Acute Hypoxic Respiratory failure- on oxygen via NC    would recommend:    1. Continue Remdesivir and Dexamethasone to complete the course  2. Monitor ALT and kidney function while on Remdesivir  3. Supplemental oxygenation and Bronchodilator as needed  4. Supportive care including AC  5. Aspiration precaution  6. COVID precautions    will follow the patient with you and make further recommendation based on the clinical course and Lab results  Thank you for the opportunity to participate in Ms. Constantino's care    Attending Attestation:    Spent more than 65 minutes on total encounter, more than 50 % of the visit was spent counseling and/or coordinating care by the Attending physician.

## 2022-10-11 NOTE — PROGRESS NOTE ADULT - PROBLEM SELECTOR PLAN 6
EKG shows nsr   CHADSVASC:  c/w carvedilol 6.25mg BID for rate control  Tele monitoring - Goal rate <110  f/u repeat TTE as previous TTE 7/29/22 showed EF 55-60%, LVH, GIDD, mild pulmonary HTN  Cardio to be Consulted -  NSR on ECG  -  Continue with telemetry monitoring  -  Continue with  carvedilol 6.25mg BID for rate control  -  Follow  repeat TTE as previous TTE 7/29/22 showed EF 55-60%, LVH, GIDD, mild pulmonary HTN  Cardiology to be Consulted -  NSR on ECG  -  Continue with telemetry monitoring  -  Continue with  carvedilol 6.25mg BID for rate control  -  Continue with Eliquis for anticoagulation  -  Follow  repeat TTE as previous TTE 7/29/22 showed EF 55-60%, LVH, GIDD, mild pulmonary HTN  -  Cardiology consulted:  Dr Denton notified

## 2022-10-11 NOTE — CONSULT NOTE ADULT - SUBJECTIVE AND OBJECTIVE BOX
Patient is a 89y old  Female who presents with a chief complaint of SOB (10 Oct 2022 23:20)    History of Present Illness:  Reason for Admission: SOB  History of Present Illness:   89F coming from home, ambulates with RW, with PMHx of COPD (not oh home o2), HFpEF (22 EF 55-60%, LVH, GIDD, mild pulmonary HTN), Afib on Eliquis, Breast CA, HTN, and with remote h/o LLE DVT sent from PCP office for Hypoxia, with associated SOB and YUAN x2d in the setting of medication noncompliance. According to granddaughter, Macario [phone] the patient has had SOB with YUAN for the past few days, which she attributes to not taking her Lasix for 5 days because patient ran out. Patient denies any associated chest pain or cough .  Patient also c/o chronic LE swelling, recently worsening. Also stating that she has right hip and right shoulder pain which she attributes to her arthritis. Per granddaughter, patient was admitted to [facility] in August for hypoxia and leg swelling, and treatment for heart failure, and discharged with an increased Lasix dose to 40mg BID. Per granddaughter, she ran out of Lasix after not going to her f/u PCP as the patient did not want want to go d/t clinical improvement. In the ED, patient found to be COVID positive, however denies any recent sick contact, as well as no fevers, chills, congestion and cough. Covid Vacc x3.     As Above. Awake, alert, comfortable in bed in NAD. Currently on oxygen supp via NC    INTERVAL HPI/OVERNIGHT EVENTS:      VITAL SIGNS:  T(F): 98.6 (10-11-22 @ 05:33)  HR: 70 (10-11-22 @ 05:33)  BP: 146/82 (10-11-22 @ 05:33)  RR: 18 (10-11-22 @ 05:33)  SpO2: 100% (10-11-22 @ 05:33)  Wt(kg): --  I&O's Detail          REVIEW OF SYSTEMS:    CONSTITUTIONAL:  No fevers, chills, sweats    HEENT:  Eyes:  No diplopia or blurred vision. ENT:  No earache, sore throat or runny nose.    CARDIOVASCULAR:  No pressure, squeezing, tightness, or heaviness about the chest; no palpitations.    RESPIRATORY:  Per HPI    GASTROINTESTINAL:  No abdominal pain, nausea, vomiting or diarrhea.    GENITOURINARY:  No dysuria, frequency or urgency.    NEUROLOGIC:  No paresthesias, fasciculations, seizures or weakness.    PSYCHIATRIC:  No disorder of thought or mood.      PHYSICAL EXAM:    Constitutional: Well developed and nourished  Eyes:Perrla  ENMT: normal  Neck:supple  Respiratory: good air entry  Cardiovascular: S1 S2 regular  Gastrointestinal: Soft, Non tender  Extremities: + edema  Vascular:normal  Neurological:Awake, alert,Ox3  Musculoskeletal:Normal      MEDICATIONS  (STANDING):  anastrozole 1 milliGRAM(s) Oral daily  apixaban 5 milliGRAM(s) Oral every 12 hours  artificial  tears Solution 1 Drop(s) Both EYES four times a day  aspirin  chewable 81 milliGRAM(s) Oral daily  carvedilol 6.25 milliGRAM(s) Oral every 12 hours  dexAMETHasone  Injectable 6 milliGRAM(s) IV Push daily  ferrous    sulfate 325 milliGRAM(s) Oral daily  furosemide   Injectable 40 milliGRAM(s) IV Push every 12 hours  montelukast 10 milliGRAM(s) Oral daily  remdesivir  IVPB   IV Intermittent   simvastatin 20 milliGRAM(s) Oral at bedtime    MEDICATIONS  (PRN):  acetaminophen     Tablet .. 650 milliGRAM(s) Oral every 6 hours PRN Temp greater or equal to 38C (100.4F), Moderate Pain (4 - 6)  ALBUTerol    90 MICROgram(s) HFA Inhaler 1 Puff(s) Inhalation every 6 hours PRN Shortness of Breath and/or Wheezing  melatonin 3 milliGRAM(s) Oral at bedtime PRN Insomnia      Allergies    losartan (Angioedema)    Intolerances    Chicken (Stomach Upset)      LABS:                        14.0   6.53  )-----------( 226      ( 11 Oct 2022 05:01 )             44.2     10-11    137  |  96  |  10  ----------------------------<  143<H>  5.6<H>   |  34<H>  |  0.96    Ca    8.9      11 Oct 2022 05:01  Phos  4.2     10-11  Mg     2.1     10-11    TPro  7.8  /  Alb  3.3<L>  /  TBili  0.6  /  DBili  x   /  AST  48<H>  /  ALT  21  /  AlkPhos  138<H>  10    PT/INR - ( 11 Oct 2022 05:01 )   PT: 17.5 sec;   INR: 1.46 ratio         PTT - ( 10 Oct 2022 19:00 )  PTT:41.1 sec  Urinalysis Basic - ( 11 Oct 2022 06:05 )    Color: Yellow / Appearance: Clear / S.005 / pH: x  Gluc: x / Ketone: Negative  / Bili: Negative / Urobili: Negative   Blood: x / Protein: Negative / Nitrite: Negative   Leuk Esterase: Negative / RBC: 0-2 /HPF / WBC 3-5 /HPF   Sq Epi: x / Non Sq Epi: Few /HPF / Bacteria: Trace /HPF            CAPILLARY BLOOD GLUCOSE      POCT Blood Glucose.: 163 mg/dL (11 Oct 2022 08:27)  POCT Blood Glucose.: 142 mg/dL (11 Oct 2022 02:15)    pro-bnp 1763 10-10 @ 17:50     d-dimer --  10-10 @ 17:50      RADIOLOGY & ADDITIONAL TESTS:    CXR:  < from: Xray Chest 1 View AP/PA (10.10.22 @ 18:43) >  IMPRESSION:   No radiographic evidence of active chest disease.    < end of copied text >  < from: US Duplex Venous Lower Ext Ltd, Left (10.10.22 @ 18:42) >    IMPRESSION:  Limited study with limited compression secondary to swelling.  No obvious left lower extremity deep venous thrombosis.      < end of copied text >    Ct scan chest:    ekg;    echo:

## 2022-10-11 NOTE — PROGRESS NOTE ADULT - PROBLEM SELECTOR PLAN 4
likely in the setting of CHF exacerbation in the setting of medication noncompliance   At baseline ambulates with RW  reports chronic LE swelling  LLE doppler negative DVT  c/o chronic right hip pain, denies fall or trauma   will start Tylenol prn for pain    Primary team to consider PT consult -  likely in the setting of CHF exacerbation in the setting of medication noncompliance   -  At baseline patient ambulates with rolling walker  -  reports chronic LE swelling  -  LLE doppler negative DVT  -  c/o chronic right hip pain, denies fall or trauma   -   PT consult -  likely in the setting of CHF exacerbation in the setting of medication noncompliance   -  At baseline patient ambulates with rolling walker  -  reports chronic LE swelling  -  LLE doppler negative DVT  -  c/o chronic right hip pain, denies fall or trauma   -   PT consulted

## 2022-10-11 NOTE — CONSULT NOTE ADULT - SUBJECTIVE AND OBJECTIVE BOX
PATIENT SEEN AND EXAMINED ON :-10/11/2022  DATE OF SERVICE:        10/11/2022     Interim events noted,Labs ,Radiological studies and Cardiology tests reviewed .        History of Present Illness:  Reason for Admission: SOB  History of Present Illness:   89F coming from home, ambulates with RW, with PMHx of COPD (not oh home o2), HFpEF (7/29/22 EF 55-60%, LVH, GIDD, mild pulmonary HTN), Afib on Eliquis, Breast CA, HTN, and with remote h/o LLE DVT sent from PCP office for Hypoxia, with associated SOB and YUAN x2d in the setting of medication noncompliance. According to granddaughter, Macario [phone] the patient has had SOB with YUAN for the past few days, which she attributes to not taking her Lasix for 5 days because patient ran out. Patient denies any associated chest pain or cough with SOB.  Patient also c/o chronic LE swelling, recently worsening. Also stating that she has right hip and right shoulder pain which she attributes to her arthritis. Per granddaughter, patient was admitted to [facility] in August for hypoxia and leg swelling, and treatment for heart failure, and discharged with an increased Lasix dose to 40mg BID. Per granddaughter, she ran out of Lasix after not going to her f/u PCP as the patient did not want want to go d/t clinical improvement. In the ED, patient found to be COVID positive, however denies any recent sick contact, as well as no fevers, chills, congestion and cough. Covid Vacc x3.       REVIEW OF SYSTEMS  General:   no fevers, chills  Ophthalmologic: no visual changes  ENMT:  no throat pain  Respiratory and Thorax:   no cough.  YUAN perisist  Cardiovascular:  no chest pain, palpitations  Gastrointestinal:  no abdominal pain, nausea or vomiting  Genitourinary:  no urinary urgency, hesitancy.  No dysuria or hematuria	  Musculoskeletal:	no musculoskeletal pain  Neurological:  no headache, dizziness, lightheadedness	  Psychiatric:  no anxiety or depression  Hematology/Lymphatics:  no easy bruising	  Endocrine:  no heat / cold intolerance, weight loss or gain	  Allergic/Immunologic:  no allergies    Patient History:   Past Medical, Past Surgical, and Family History:  PAST MEDICAL HISTORY:  Arthritis     Atrial fibrillation     Breast cancer right, no chemo or radiation    COPD exacerbation     Deep vein thrombosis (DVT) Left Lower Extremity    HF (heart failure) HFpEF 7/29    HTN (hypertension)     Legally blind     Pre-diabetes.     PAST SURGICAL HISTORY:  No significant past surgical history.    Social History:  · Substance use	No            MEDICATIONS  (STANDING):  anastrozole 1 milliGRAM(s) Oral daily  apixaban 5 milliGRAM(s) Oral every 12 hours  artificial  tears Solution 1 Drop(s) Both EYES four times a day  aspirin  chewable 81 milliGRAM(s) Oral daily  carvedilol 6.25 milliGRAM(s) Oral every 12 hours  dexAMETHasone  Injectable 6 milliGRAM(s) IV Push daily  ferrous    sulfate 325 milliGRAM(s) Oral daily  furosemide   Injectable 40 milliGRAM(s) IV Push every 12 hours  montelukast 10 milliGRAM(s) Oral daily  remdesivir  IVPB   IV Intermittent   simvastatin 20 milliGRAM(s) Oral at bedtime    MEDICATIONS  (PRN):  acetaminophen     Tablet .. 650 milliGRAM(s) Oral every 6 hours PRN Temp greater or equal to 38C (100.4F), Moderate Pain (4 - 6)  ALBUTerol    90 MICROgram(s) HFA Inhaler 1 Puff(s) Inhalation every 6 hours PRN Shortness of Breath and/or Wheezing  melatonin 3 milliGRAM(s) Oral at bedtime PRN Insomnia  Vital Signs Last 24 Hrs  T(C): 36.4 (11 Oct 2022 18:01), Max: 37.5 (11 Oct 2022 03:54)  T(F): 97.6 (11 Oct 2022 18:01), Max: 99.5 (11 Oct 2022 03:54)  HR: 75 (11 Oct 2022 18:01) (65 - 81)  BP: 122/83 (11 Oct 2022 18:01) (112/65 - 146/82)  BP(mean): --  RR: 20 (11 Oct 2022 18:01) (18 - 20)  SpO2: 100% (11 Oct 2022 13:06) (99% - 100%)    Parameters below as of 11 Oct 2022 18:01  Patient On (Oxygen Delivery Method): nasal cannula  O2 Flow (L/min): 3      PHYSICAL EXAM:  Constitutional:  No acute distress  Eyes:  PERRLA  ENMT:  moist mucous membranes  Neck:  Supple.  No JVD noted  Respiratory:  non labored.  Clear bilaterally.  No rales, wheezes or rhonchi  Cardiovascular:  regular rate and rhythm  Gastrointestinal:  soft and non tender.   +BS  Extremities:  BLE edema,   intact distal pulses  Neurological:  alert and oriented x 4.  No deficits  Skin: intact  Musculoskeletal:  no joint swelling.  Moves all            extremities freely.  Psychiatric:  Behavior appropriate to situation.  No behavioral issues.          14.0   6.53  )-----------( 226      ( 11 Oct 2022 05:01 )             44.2         10-11    137  |  96  |  10  ----------------------------<  143<H>  5.6<H>   |  34<H>  |  0.96    Ca    8.9      11 Oct 2022 05:01  Phos  4.2     10-11  Mg     2.1     10-11    TPro  7.8  /  Alb  3.3<L>  /  TBili  0.6  /  DBili  x   /  AST  48<H>  /  ALT  21  /  AlkPhos  138<H>  10-11

## 2022-10-11 NOTE — PROGRESS NOTE ADULT - PROBLEM SELECTOR PLAN 2
p/w SOB with YUAN x2d + LE edema, chronic   CXR noted to have b/l infiltrates with no evidence of consolidation, c/w pulmonary congestion   Elevated proBNP 1763 and trop 176.3, and no EKG changes   takes coreg 6.25mg BID and lasix 40mg BID at home, but has not taken Lasix x5d as she ran out   s/p Lasix 40mg IVP x1 and Decadron 6mg IVPx1, and started on remdesivir in the ED  c/w correg with holding parameters   c/w IV lasix 40mg, titrate based on volume status, response, and O2 titration   f/u repeat TTE as previous TTE 7/29/22 showed EF 55-60%, LVH, GIDD, mild pulmonary HTN  Admit to tele  f/u T2, trend trop until peak   daily weights, strict I&O  Cardio to be consulted -  Elevated proBNP 1763   -  Trop 176.3, and no EKG changes   -  Takes coreg 6.25mg BID and lasix 40mg BID at home, but has not taken Lasix x5d as she ran out   -  s/p Lasix 40mg IVP x1 and Decadron 6mg IVPx1, and started on remdesivir in the ED  -  Continue with correg with holding parameters   -  Continue with  IV lasix 40mg, titrate based on volume status, response, and O2 titration   -   Repeat TTE as previous TTE 7/29/22 showed EF 55-60%, LVH, GIDD, mild pulmonary HTN  -  daily weights, strict I&O  -  Cardiology consulted -  Elevated proBNP 1763   -  Trop 168, and no EKG changes   -  Takes coreg 6.25mg BID and lasix 40mg BID at home, but has not taken Lasix x5d as she ran out   -  s/p Lasix 40mg IVP x1 and Decadron 6mg IVPx1,   -  Continue with coreg with holding parameters   -  Continue with  IV lasix 40mg, titrate based on volume status, response, and O2 titration   -   Repeat TTE as previous TTE 7/29/22 showed EF 55-60%, LVH, GIDD, mild pulmonary HTN  -  daily weights, strict I&O  -  Cardiology consulted;   Dr Denton -   Elevated ProBNP 1763   -  Trop 168, and no EKG changes   -  Takes coreg 6.25mg BID and lasix 40mg BID at home, but has not taken Lasix x5d as she ran out   -  s/p Lasix 40mg IVP x1 and Decadron 6mg IVP x 1,   -  Continue with coreg with holding parameters   -  Continue with  IV lasix 40mg, titrate based on volume status, response, and O2 titration   -   Repeat TTE as previous TTE 7/29/22 showed EF 55-60%, LVH, GIDD, mild pulmonary HTN  -  Daily weights, strict I&O  -  Cardiology consulted;   Dr Denton

## 2022-10-11 NOTE — PROGRESS NOTE ADULT - PROBLEM SELECTOR PLAN 5
Noted to be COVID (+) in the ED, denies any active respiratory symptoms or sick contacts  Desaturating to 87% on RA, baseline 90-95% with a h/o COPD not on home o2  Started remdesivir x5d + Decadron x10 days  f/u COVID Ab - d/c remdesivir if positive  Obtain ambulatory O2 and document  CXR noted to have b/l infiltrates with no evidence of consolidation, c/w pulmonary congestion   obtain inflammatory markers(d-dimer, procal, LDH, ferritin, ESR, CRP)  Maintain O2 88-92% for COPD, o2 support titrate as needed.  ID: Dr. Denton -  Noted to be COVID (+) in the ED, denies any active respiratory symptoms or sick contacts  -  Desaturating to 87% on RA, baseline 90-95% with a h/o COPD not on home o2  -  Started on remdesivir x 5 days and Decadron x10 days  -  f/u COVID Ab - d/c remdesivir if positive  -  Obtain ambulatory O2 and document  -  CXR noted to have b/l infiltrates with no evidence of consolidation, c/w pulmonary congestion   -  Obtain inflammatory markers(d-dimer, procalcitonin, LDH, ferritin, ESR, CRP)  -  Maintain O2 88-92% for COPD, o2 support titrate as needed.  -  ID: Dr. Denton -  Noted to be COVID (+) in the ED, denies any active respiratory symptoms or sick contacts  -  Desaturating to 87% on RA, baseline 90-95% with a h/o COPD not on home o2  -  Started on remdesivir x 5 days and Decadron x10 days  -  f/u COVID Ab - d/c remdesivir if positive  -  Obtain ambulatory O2 and document  -  CXR noted to have b/l infiltrates with no evidence of consolidation,   -  Obtain inflammatory markers(d-dimer, procalcitonin, LDH, ferritin, ESR, CRP)  -  Maintain O2 88-92% for COPD, o2 support titrate as needed.  -  ID: Dr. Denton

## 2022-10-11 NOTE — PROGRESS NOTE ADULT - SUBJECTIVE AND OBJECTIVE BOX
MEDICATIONS  (STANDING):  anastrozole 1 milliGRAM(s) Oral daily  apixaban 5 milliGRAM(s) Oral every 12 hours  artificial  tears Solution 1 Drop(s) Both EYES four times a day  aspirin  chewable 81 milliGRAM(s) Oral daily  carvedilol 6.25 milliGRAM(s) Oral every 12 hours  dexAMETHasone  Injectable 6 milliGRAM(s) IV Push daily  ferrous    sulfate 325 milliGRAM(s) Oral daily  furosemide   Injectable 40 milliGRAM(s) IV Push every 12 hours  montelukast 10 milliGRAM(s) Oral daily  remdesivir  IVPB   IV Intermittent   simvastatin 20 milliGRAM(s) Oral at bedtime    MEDICATIONS  (PRN):  acetaminophen     Tablet .. 650 milliGRAM(s) Oral every 6 hours PRN Temp greater or equal to 38C (100.4F), Moderate Pain (4 - 6)  ALBUTerol    90 MICROgram(s) HFA Inhaler 1 Puff(s) Inhalation every 6 hours PRN Shortness of Breath and/or Wheezing  melatonin 3 milliGRAM(s) Oral at bedtime PRN Insomnia        REVIEW OF SYSTEMS  General:   no fevers, chills  Ophthalmologic: no visual changes  ENMT:  no throat pain  Respiratory and Thorax:   no cough.  YUAN perisist  Cardiovascular:  no chest pain, palpitations  Gastrointestinal:  no abdominal pain, nausea or vomiting  Genitourinary:  no urinary urgency, hesitancy.  No dysuria or hematuria	  Musculoskeletal:	no musculoskeletal pain  Neurological:  no headache, dizziness, lightheadedness	  Psychiatric:  no anxiety or depression  Hematology/Lymphatics:  no easy bruising	  Endocrine:  no heat / cold intolerance, weight loss or gain	  Allergic/Immunologic:  no allergies      Vital Signs Last 24 Hrs  T(C): 36.4 (11 Oct 2022 18:01), Max: 37.5 (11 Oct 2022 03:54)  T(F): 97.6 (11 Oct 2022 18:01), Max: 99.5 (11 Oct 2022 03:54)  HR: 75 (11 Oct 2022 18:01) (65 - 81)  BP: 122/83 (11 Oct 2022 18:01) (112/65 - 146/82)  BP(mean): --  RR: 20 (11 Oct 2022 18:01) (18 - 20)  SpO2: 100% (11 Oct 2022 13:06) (99% - 100%)    Parameters below as of 11 Oct 2022 18:01  Patient On (Oxygen Delivery Method): nasal cannula  O2 Flow (L/min): 3      PHYSICAL EXAM:  Constitutional:  No acute distress  Eyes:  PERRLA  ENMT:  moist mucous membranes  Neck:  Supple.  No JVD noted  Respiratory:  non labored.  Clear bilaterally.  No rales, wheezes or rhonchi  Cardiovascular:  regular rate and rhythm  Gastrointestinal:  soft and non tender.   +BS  Extremities:  BLE edema,   intact distal pulses  Neurological:  alert and oriented x 4.  No deficits  Skin: intact  Musculoskeletal:  no joint swelling.  Moves all            extremities freely.  Psychiatric:  Behavior appropriate to situation.  No behavioral issues.                              14.0   6.53  )-----------( 226      ( 11 Oct 2022 05:01 )             44.2         10-11    137  |  96  |  10  ----------------------------<  143<H>  5.6<H>   |  34<H>  |  0.96    Ca    8.9      11 Oct 2022 05:01  Phos  4.2     10-11  Mg     2.1     10-11    TPro  7.8  /  Alb  3.3<L>  /  TBili  0.6  /  DBili  x   /  AST  48<H>  /  ALT  21  /  AlkPhos  138<H>  10-11      < from: Xray Chest 1 View AP/PA (10.10.22 @ 18:43) >  PULMONARY: The visualized lungs are clear of airspace consolidations or   effusions.   No pneumothorax.    < end of copied text >  < from: US Duplex Venous Lower Ext Ltd, Left (10.10.22 @ 18:42) >  IMPRESSION:  Limited study with limited compression secondary to swelling.  No obvious left lower extremity deep venous thrombosis.    < end of copied text >     89 year old of admitted with SOB    No overnight events noted        MEDICATIONS  (STANDING):  anastrozole 1 milliGRAM(s) Oral daily  apixaban 5 milliGRAM(s) Oral every 12 hours  artificial  tears Solution 1 Drop(s) Both EYES four times a day  aspirin  chewable 81 milliGRAM(s) Oral daily  carvedilol 6.25 milliGRAM(s) Oral every 12 hours  dexAMETHasone  Injectable 6 milliGRAM(s) IV Push daily  ferrous    sulfate 325 milliGRAM(s) Oral daily  furosemide   Injectable 40 milliGRAM(s) IV Push every 12 hours  montelukast 10 milliGRAM(s) Oral daily  remdesivir  IVPB   IV Intermittent   simvastatin 20 milliGRAM(s) Oral at bedtime    MEDICATIONS  (PRN):  acetaminophen     Tablet .. 650 milliGRAM(s) Oral every 6 hours PRN Temp greater or equal to 38C (100.4F), Moderate Pain (4 - 6)  ALBUTerol    90 MICROgram(s) HFA Inhaler 1 Puff(s) Inhalation every 6 hours PRN Shortness of Breath and/or Wheezing  melatonin 3 milliGRAM(s) Oral at bedtime PRN Insomnia        REVIEW OF SYSTEMS  General:   no fevers, chills  Ophthalmologic: no visual changes  ENMT:  no throat pain  Respiratory and Thorax:   no cough.  YUAN perisist  Cardiovascular:  no chest pain, palpitations  Gastrointestinal:  no abdominal pain, nausea or vomiting  Genitourinary:  no urinary urgency, hesitancy.  No dysuria or hematuria	  Musculoskeletal:	no musculoskeletal pain  Neurological:  no headache, dizziness, lightheadedness	  Psychiatric:  no anxiety or depression  Hematology/Lymphatics:  no easy bruising	  Endocrine:  no heat / cold intolerance, weight loss or gain	  Allergic/Immunologic:  no allergies      Vital Signs Last 24 Hrs  T(C): 36.4 (11 Oct 2022 18:01), Max: 37.5 (11 Oct 2022 03:54)  T(F): 97.6 (11 Oct 2022 18:01), Max: 99.5 (11 Oct 2022 03:54)  HR: 75 (11 Oct 2022 18:01) (65 - 81)  BP: 122/83 (11 Oct 2022 18:01) (112/65 - 146/82)  BP(mean): --  RR: 20 (11 Oct 2022 18:01) (18 - 20)  SpO2: 100% (11 Oct 2022 13:06) (99% - 100%)    Parameters below as of 11 Oct 2022 18:01  Patient On (Oxygen Delivery Method): nasal cannula  O2 Flow (L/min): 3      PHYSICAL EXAM:  Constitutional:  No acute distress  Eyes:  PERRLA  ENMT:  moist mucous membranes  Neck:  Supple.  No JVD noted  Respiratory:  non labored.  Clear bilaterally.  No rales, wheezes or rhonchi  Cardiovascular:  regular rate and rhythm  Gastrointestinal:  soft and non tender.   +BS  Extremities:  BLE edema,   intact distal pulses  Neurological:  alert and oriented x 4.  No deficits  Skin: intact  Musculoskeletal:  no joint swelling.  Moves all            extremities freely.  Psychiatric:  Behavior appropriate to situation.  No behavioral issues.                              14.0   6.53  )-----------( 226      ( 11 Oct 2022 05:01 )             44.2         10-11    137  |  96  |  10  ----------------------------<  143<H>  5.6<H>   |  34<H>  |  0.96    Ca    8.9      11 Oct 2022 05:01  Phos  4.2     10-11  Mg     2.1     10-11    TPro  7.8  /  Alb  3.3<L>  /  TBili  0.6  /  DBili  x   /  AST  48<H>  /  ALT  21  /  AlkPhos  138<H>  10-11      < from: Xray Chest 1 View AP/PA (10.10.22 @ 18:43) >  PULMONARY: The visualized lungs are clear of airspace consolidations or   effusions.   No pneumothorax.    < end of copied text >  < from: US Duplex Venous Lower Ext Ltd, Left (10.10.22 @ 18:42) >  IMPRESSION:  Limited study with limited compression secondary to swelling.  No obvious left lower extremity deep venous thrombosis.    < end of copied text >

## 2022-10-12 LAB
ALBUMIN SERPL ELPH-MCNC: 3 G/DL — LOW (ref 3.5–5)
ALP SERPL-CCNC: 128 U/L — HIGH (ref 40–120)
ALT FLD-CCNC: 18 U/L DA — SIGNIFICANT CHANGE UP (ref 10–60)
ANION GAP SERPL CALC-SCNC: 5 MMOL/L — SIGNIFICANT CHANGE UP (ref 5–17)
AST SERPL-CCNC: 17 U/L — SIGNIFICANT CHANGE UP (ref 10–40)
BILIRUB DIRECT SERPL-MCNC: 0.1 MG/DL — SIGNIFICANT CHANGE UP (ref 0–0.3)
BILIRUB INDIRECT FLD-MCNC: 0.2 MG/DL — SIGNIFICANT CHANGE UP (ref 0.2–1)
BILIRUB SERPL-MCNC: 0.3 MG/DL — SIGNIFICANT CHANGE UP (ref 0.2–1.2)
BUN SERPL-MCNC: 22 MG/DL — HIGH (ref 7–18)
CALCIUM SERPL-MCNC: 8.6 MG/DL — SIGNIFICANT CHANGE UP (ref 8.4–10.5)
CHLORIDE SERPL-SCNC: 99 MMOL/L — SIGNIFICANT CHANGE UP (ref 96–108)
CO2 SERPL-SCNC: 37 MMOL/L — HIGH (ref 22–31)
CREAT SERPL-MCNC: 0.93 MG/DL — SIGNIFICANT CHANGE UP (ref 0.5–1.3)
EGFR: 58 ML/MIN/1.73M2 — LOW
GLUCOSE BLDC GLUCOMTR-MCNC: 137 MG/DL — HIGH (ref 70–99)
GLUCOSE BLDC GLUCOMTR-MCNC: 185 MG/DL — HIGH (ref 70–99)
GLUCOSE SERPL-MCNC: 155 MG/DL — HIGH (ref 70–99)
HCT VFR BLD CALC: 43.3 % — SIGNIFICANT CHANGE UP (ref 34.5–45)
HGB BLD-MCNC: 13.1 G/DL — SIGNIFICANT CHANGE UP (ref 11.5–15.5)
INR BLD: 1.65 RATIO — HIGH (ref 0.88–1.16)
MCHC RBC-ENTMCNC: 30.3 GM/DL — LOW (ref 32–36)
MCHC RBC-ENTMCNC: 30.8 PG — SIGNIFICANT CHANGE UP (ref 27–34)
MCV RBC AUTO: 101.9 FL — HIGH (ref 80–100)
NRBC # BLD: 0 /100 WBCS — SIGNIFICANT CHANGE UP (ref 0–0)
PLATELET # BLD AUTO: 208 K/UL — SIGNIFICANT CHANGE UP (ref 150–400)
POTASSIUM SERPL-MCNC: 5 MMOL/L — SIGNIFICANT CHANGE UP (ref 3.5–5.3)
POTASSIUM SERPL-SCNC: 5 MMOL/L — SIGNIFICANT CHANGE UP (ref 3.5–5.3)
PROT SERPL-MCNC: 6.9 G/DL — SIGNIFICANT CHANGE UP (ref 6–8.3)
PROTHROM AB SERPL-ACNC: 19.7 SEC — HIGH (ref 10.5–13.4)
RBC # BLD: 4.25 M/UL — SIGNIFICANT CHANGE UP (ref 3.8–5.2)
RBC # FLD: 13.2 % — SIGNIFICANT CHANGE UP (ref 10.3–14.5)
SODIUM SERPL-SCNC: 141 MMOL/L — SIGNIFICANT CHANGE UP (ref 135–145)
WBC # BLD: 6.22 K/UL — SIGNIFICANT CHANGE UP (ref 3.8–10.5)
WBC # FLD AUTO: 6.22 K/UL — SIGNIFICANT CHANGE UP (ref 3.8–10.5)

## 2022-10-12 RX ORDER — POTASSIUM CHLORIDE 20 MEQ
40 PACKET (EA) ORAL ONCE
Refills: 0 | Status: COMPLETED | OUTPATIENT
Start: 2022-10-12 | End: 2022-10-12

## 2022-10-12 RX ADMIN — ANASTROZOLE 1 MILLIGRAM(S): 1 TABLET ORAL at 12:44

## 2022-10-12 RX ADMIN — Medication 1 DROP(S): at 00:41

## 2022-10-12 RX ADMIN — SIMVASTATIN 20 MILLIGRAM(S): 20 TABLET, FILM COATED ORAL at 21:12

## 2022-10-12 RX ADMIN — Medication 40 MILLIGRAM(S): at 06:08

## 2022-10-12 RX ADMIN — Medication 325 MILLIGRAM(S): at 12:45

## 2022-10-12 RX ADMIN — Medication 81 MILLIGRAM(S): at 12:43

## 2022-10-12 RX ADMIN — MONTELUKAST 10 MILLIGRAM(S): 4 TABLET, CHEWABLE ORAL at 12:45

## 2022-10-12 RX ADMIN — CARVEDILOL PHOSPHATE 6.25 MILLIGRAM(S): 80 CAPSULE, EXTENDED RELEASE ORAL at 06:07

## 2022-10-12 RX ADMIN — Medication 1 DROP(S): at 23:47

## 2022-10-12 RX ADMIN — APIXABAN 5 MILLIGRAM(S): 2.5 TABLET, FILM COATED ORAL at 18:16

## 2022-10-12 RX ADMIN — APIXABAN 5 MILLIGRAM(S): 2.5 TABLET, FILM COATED ORAL at 06:07

## 2022-10-12 RX ADMIN — REMDESIVIR 500 MILLIGRAM(S): 5 INJECTION INTRAVENOUS at 00:42

## 2022-10-12 RX ADMIN — Medication 40 MILLIEQUIVALENT(S): at 18:18

## 2022-10-12 RX ADMIN — Medication 6 MILLIGRAM(S): at 06:08

## 2022-10-12 RX ADMIN — CARVEDILOL PHOSPHATE 6.25 MILLIGRAM(S): 80 CAPSULE, EXTENDED RELEASE ORAL at 18:16

## 2022-10-12 RX ADMIN — REMDESIVIR 500 MILLIGRAM(S): 5 INJECTION INTRAVENOUS at 23:47

## 2022-10-12 RX ADMIN — Medication 1 DROP(S): at 06:07

## 2022-10-12 RX ADMIN — Medication 1 DROP(S): at 12:44

## 2022-10-12 RX ADMIN — Medication 1 DROP(S): at 18:16

## 2022-10-12 RX ADMIN — Medication 40 MILLIGRAM(S): at 18:16

## 2022-10-12 NOTE — PROGRESS NOTE ADULT - PROBLEM SELECTOR PLAN 7
-  Presented with SOB now requiring supplemental 02, likely due to CHF exacerbation in the setting of medication noncompliance    -  History of COPD,  not on home 02, takes trelegy Ellipta and albuterol prn   -  Not currently in exacerbation, no wheezing on exam  -  CXR noted to have b/l infiltrates with no evidence of consolidation, consistent with pulmonary congestion   -  Continue with  symbicort, spiriva and albuterol prn  -  O2 support as needed, maintain O2 sat 88-92%, avoid over oxygenation

## 2022-10-12 NOTE — PROGRESS NOTE ADULT - PROBLEM SELECTOR PLAN 3
-   Elevated potassium of  6.3 on admission  -   No EKG changes noted  -  likely in the setting of CHF exacerbation d/t medication noncompliance  -   hyperkalemia cocktail  -  Continue with Lasix twice daily

## 2022-10-12 NOTE — PROGRESS NOTE ADULT - PROBLEM SELECTOR PLAN 2
isolation precautions  monitor LDH, LFTs, D-Dimer, Ferritin, CRP and procalcitonin  follow up Covid PCR  follow up CXR  IV Dexa/Remdesevir

## 2022-10-12 NOTE — PROGRESS NOTE ADULT - SUBJECTIVE AND OBJECTIVE BOX
Patient is seen and examined at the bed side, is afebrile. She is doing better, sitting up at the edge of bed and eating.      REVIEW OF SYSTEMS: All other review systems are negative      ALLERGIES: Chicken (Stomach Upset)  losartan (Angioedema)      Vital Signs Last 24 Hrs  T(C): 36.6 (12 Oct 2022 14:42), Max: 36.6 (12 Oct 2022 14:42)  T(F): 97.9 (12 Oct 2022 14:42), Max: 97.9 (12 Oct 2022 14:42)  HR: 95 (12 Oct 2022 14:42) (60 - 95)  BP: 121/82 (12 Oct 2022 14:42) (110/70 - 126/79)  BP(mean): --  RR: 18 (12 Oct 2022 14:42) (18 - 19)  SpO2: 100% (12 Oct 2022 14:42) (98% - 100%)    Parameters below as of 12 Oct 2022 14:42  Patient On (Oxygen Delivery Method): nasal cannula  O2 Flow (L/min): 3      PHYSICAL EXAM:  GENERAL: Not in distress, on oxygen via NC  CHEST/LUNG: Not using accessory muscles   CNS: Awake and Alert  - Please refer to Primary team's note for rest of the systems      LABS:                        13.1   6.22  )-----------( 208      ( 12 Oct 2022 06:21 )             43.3                           14.0   6.53  )-----------( 226      ( 11 Oct 2022 05:01 )             44.2         10-12    141  |  99  |  22<H>  ----------------------------<  155<H>  5.0   |  37<H>  |  0.93    Ca    8.6      12 Oct 2022 06:21  Phos  4.2     10-11  Mg     2.1     10-11    TPro  6.9  /  Alb  3.0<L>  /  TBili  0.3  /  DBili  0.1  /  AST  17  /  ALT  18  /  AlkPhos  128<H>  10-12    10-11    137  |  96  |  10  ----------------------------<  143<H>  5.6<H>   |  34<H>  |  0.96    Ca    8.9      11 Oct 2022 05:01  Phos  4.2     10-11  Mg     2.1     10-11    TPro  7.8  /  Alb  3.3<L>  /  TBili  0.6  /  DBili  x   /  AST  48<H>  /  ALT  21  /  AlkPhos  138<H>  10-11    PT/INR - ( 11 Oct 2022 05:01 )   PT: 17.5 sec;   INR: 1.46 ratio      PTT - ( 10 Oct 2022 19:00 )  PTT:41.1 sec        CAPILLARY BLOOD GLUCOSE  POCT Blood Glucose.: 164 mg/dL (11 Oct 2022 12:06)  POCT Blood Glucose.: 163 mg/dL (11 Oct 2022 08:27)  POCT Blood Glucose.: 142 mg/dL (11 Oct 2022 02:15)        Urinalysis Basic - ( 11 Oct 2022 06:05 )  Color: Yellow / Appearance: Clear / S.005 / pH: x  Gluc: x / Ketone: Negative  / Bili: Negative / Urobili: Negative   Blood: x / Protein: Negative / Nitrite: Negative   Leuk Esterase: Negative / RBC: 0-2 /HPF / WBC 3-5 /HPF   Sq Epi: x / Non Sq Epi: Few /HPF / Bacteria: Trace /HPF        MEDICATIONS  (STANDING):    anastrozole 1 milliGRAM(s) Oral daily  apixaban 5 milliGRAM(s) Oral every 12 hours  artificial  tears Solution 1 Drop(s) Both EYES four times a day  aspirin  chewable 81 milliGRAM(s) Oral daily  carvedilol 6.25 milliGRAM(s) Oral every 12 hours  dexAMETHasone  Injectable 6 milliGRAM(s) IV Push daily  ferrous    sulfate 325 milliGRAM(s) Oral daily  furosemide   Injectable 40 milliGRAM(s) IV Push every 12 hours  montelukast 10 milliGRAM(s) Oral daily  potassium chloride   Powder 40 milliEquivalent(s) Oral once  remdesivir  IVPB   IV Intermittent   remdesivir  IVPB 100 milliGRAM(s) IV Intermittent every 24 hours  simvastatin 20 milliGRAM(s) Oral at bedtime      RADIOLOGY & ADDITIONAL TESTS:    10/10/22 : Xray Chest 1 View AP/PA (10.10.22 @ 18:43) IMPRESSION:   No radiographic evidence of active chest disease.      10/10/22 : US Duplex Venous Lower Ext Ltd, Left (10.10.22 @ 18:42) Limited study with limited compression secondary to swelling.  No obvious left lower extremity deep venous thrombosis.      MICROBIOLOGY DATA:    Flu With COVID-19 By PATRICK (10.10.22 @ 17:50)   SARS-CoV-2 Result: Detected:

## 2022-10-12 NOTE — PROGRESS NOTE ADULT - PROBLEM SELECTOR PLAN 4
-  likely in the setting of CHF exacerbation in the setting of medication noncompliance   -  At baseline patient ambulates with rolling walker  -  reports chronic LE swelling  -  LLE doppler negative DVT  -  c/o chronic right hip pain, denies fall or trauma   -   PT consulted

## 2022-10-12 NOTE — PROGRESS NOTE ADULT - SUBJECTIVE AND OBJECTIVE BOX
Patient is a 89y old  Female who presents with a chief complaint of SOB (11 Oct 2022 17:21)  Patient is laying in bed, asleep with oxygen supp via NC in NAD    INTERVAL HPI/OVERNIGHT EVENTS:      VITAL SIGNS:  T(F): 96.8 (10-12-22 @ 06:13)  HR: 60 (10-12-22 @ 06:13)  BP: 110/70 (10-12-22 @ 06:13)  RR: 18 (10-12-22 @ 06:13)  SpO2: 100% (10-12-22 @ 06:13)  Wt(kg): --  I&O's Detail    11 Oct 2022 07:01  -  12 Oct 2022 07:00  --------------------------------------------------------  IN:  Total IN: 0 mL    OUT:    Voided (mL): 450 mL  Total OUT: 450 mL    Total NET: -450 mL              REVIEW OF SYSTEMS:    CONSTITUTIONAL:  No fevers, chills, sweats    HEENT:  Eyes:  No diplopia or blurred vision. ENT:  No earache, sore throat or runny nose.    CARDIOVASCULAR:  No pressure, squeezing, tightness, or heaviness about the chest; no palpitations.    RESPIRATORY:  Per HPI    GASTROINTESTINAL:  No abdominal pain, nausea, vomiting or diarrhea.    GENITOURINARY:  No dysuria, frequency or urgency.    NEUROLOGIC:  No paresthesias, fasciculations, seizures or weakness.    PSYCHIATRIC:  No disorder of thought or mood.      PHYSICAL EXAM:    Constitutional: Well developed and nourished  Eyes:Perrla  ENMT: normal  Neck:supple  Respiratory: good air entry  Cardiovascular: S1 S2 regular  Gastrointestinal: Soft, Non tender  Extremities: + edema  Vascular:normal  Neurological:Asleep  Musculoskeletal:Normal      MEDICATIONS  (STANDING):  anastrozole 1 milliGRAM(s) Oral daily  apixaban 5 milliGRAM(s) Oral every 12 hours  artificial  tears Solution 1 Drop(s) Both EYES four times a day  aspirin  chewable 81 milliGRAM(s) Oral daily  carvedilol 6.25 milliGRAM(s) Oral every 12 hours  dexAMETHasone  Injectable 6 milliGRAM(s) IV Push daily  ferrous    sulfate 325 milliGRAM(s) Oral daily  furosemide   Injectable 40 milliGRAM(s) IV Push every 12 hours  montelukast 10 milliGRAM(s) Oral daily  remdesivir  IVPB   IV Intermittent   remdesivir  IVPB 100 milliGRAM(s) IV Intermittent every 24 hours  simvastatin 20 milliGRAM(s) Oral at bedtime    MEDICATIONS  (PRN):  acetaminophen     Tablet .. 650 milliGRAM(s) Oral every 6 hours PRN Temp greater or equal to 38C (100.4F), Moderate Pain (4 - 6)  ALBUTerol    90 MICROgram(s) HFA Inhaler 1 Puff(s) Inhalation every 6 hours PRN Shortness of Breath and/or Wheezing  melatonin 3 milliGRAM(s) Oral at bedtime PRN Insomnia      Allergies    losartan (Angioedema)    Intolerances    Chicken (Stomach Upset)      LABS:                        13.1   6.22  )-----------( 208      ( 12 Oct 2022 06:21 )             43.3     10-12    141  |  99  |  22<H>  ----------------------------<  155<H>  5.0   |  37<H>  |  0.93    Ca    8.6      12 Oct 2022 06:21  Phos  4.2     10-11  Mg     2.1     10-11    TPro  6.9  /  Alb  3.0<L>  /  TBili  0.3  /  DBili  0.1  /  AST  17  /  ALT  18  /  AlkPhos  128<H>  10-12    PT/INR - ( 12 Oct 2022 07:31 )   PT: 19.7 sec;   INR: 1.65 ratio         PTT - ( 10 Oct 2022 19:00 )  PTT:41.1 sec  Urinalysis Basic - ( 11 Oct 2022 06:05 )    Color: Yellow / Appearance: Clear / S.005 / pH: x  Gluc: x / Ketone: Negative  / Bili: Negative / Urobili: Negative   Blood: x / Protein: Negative / Nitrite: Negative   Leuk Esterase: Negative / RBC: 0-2 /HPF / WBC 3-5 /HPF   Sq Epi: x / Non Sq Epi: Few /HPF / Bacteria: Trace /HPF            CAPILLARY BLOOD GLUCOSE      POCT Blood Glucose.: 137 mg/dL (12 Oct 2022 08:50)  POCT Blood Glucose.: 164 mg/dL (11 Oct 2022 12:06)    pro-bnp 1763 10-10 @ 17:50     d-dimer --  10-10 @ 17:50      RADIOLOGY & ADDITIONAL TESTS:    CXR:  < from: Xray Chest 1 View AP/PA (10.10.22 @ 18:43) >  IMPRESSION:   No radiographic evidence of active chest disease.    < end of copied text >    Ct scan chest:    ekg;    echo:

## 2022-10-12 NOTE — PROGRESS NOTE ADULT - PROBLEM SELECTOR PLAN 5
-  Noted to be COVID (+) in the ED, denies any active respiratory symptoms or sick contacts  -  Desaturating to 87% on RA, baseline 90-95% with a h/o COPD not on home o2  -  Started on remdesivir x 5 days and Decadron x10 days  -  f/u COVID Ab - d/c remdesivir if positive  -  Obtain ambulatory O2 and document  -  CXR noted to have b/l infiltrates with no evidence of consolidation,   -  Obtain inflammatory markers(d-dimer, procalcitonin, LDH, ferritin, ESR, CRP)  -  Maintain O2 88-92% for COPD, o2 support titrate as needed.  -  ID: Dr. Denton

## 2022-10-12 NOTE — PROGRESS NOTE ADULT - PROBLEM SELECTOR PLAN 2
-   Elevated ProBNP 1763   -  Trop 168, and no EKG changes   -  Takes coreg 6.25mg BID and lasix 40mg BID at home, but has not taken Lasix x5d as she ran out   -  s/p Lasix 40mg IVP x1 and Decadron 6mg IVP x 1,   -  Continue with coreg with holding parameters   -  Continue with  IV lasix 40mg, titrate based on volume status, response, and O2 titration   -   Repeat TTE as previous TTE 7/29/22 showed EF 55-60%, LVH, GIDD, mild pulmonary HTN  -  Daily weights, strict I&O  -  Cardiology consulted;   Dr Denton -   Elevated ProBNP 1763   -  Trop 168, and no EKG changes   -  Takes coreg 6.25mg BID and lasix 40mg BID at home, but has not taken Lasix x5d as she ran out   -  s/p Lasix 40mg IVP x1 and Decadron 6mg IVP x 1,   -  Continue with coreg with holding parameters   -  Continue with  IV lasix 40mg, titrate based on volume status, response, and O2 titration   -   previous TTE 7/29/22 showed EF 55-60%, LVH, GIDD, mild pulmonary HTN  -  Daily weights, strict I&O  -  Cardiology consulted;   Dr Denton

## 2022-10-12 NOTE — PROGRESS NOTE ADULT - ASSESSMENT
89F coming from home, ambulates with RW, with PMHx of COPD (not oh home o2), HFpEF (7/29/22 EF 55-60%, LVH, GIDD, mild pulmonary HTN), Afib on Eliquis, Breast CA, HTN, and with remote h/o LLE DVT sent from PCP office for Hypoxia, with associated SOB and YUAN x2d. Patient admitted to telemetry for AHRF likely 2/2 CHF exacerbation in the setting of medication noncompliance. Covid Positive on RDX and Dex saturating on 100% on 3L

## 2022-10-12 NOTE — PROGRESS NOTE ADULT - PROBLEM SELECTOR PLAN 6
-  NSR on ECG  -  Continue with telemetry monitoring  -  Continue with  carvedilol 6.25mg BID for rate control  -  Continue with Eliquis for anticoagulation  -  Follow  repeat TTE as previous TTE 7/29/22 showed EF 55-60%, LVH, GIDD, mild pulmonary HTN  -  Cardiology consulted:  Dr Denton notified -  NSR on ECG  -  Continue with telemetry monitoring  -  Continue with  carvedilol 6.25mg BID for rate control  -  Continue with Eliquis for anticoagulation  -  previous TTE 7/29/22 showed EF 55-60%, LVH, GIDD, mild pulmonary HTN  -  Cardiology consulted:  Dr Denton notified

## 2022-10-12 NOTE — PROGRESS NOTE ADULT - PROBLEM SELECTOR PLAN 1
-  Patient was sent from PCP office for Hypoxia in the setting of SOB on exertion and b/l pitting edema,  -  Has known history of HFpEF  -  likely secondary to CHF exacerbation in the setting of medication noncompliance;   -  ProBNP 1763   -  Trop 168  -  SpO2  84 % on RA in the ED  -  Noted to become dyspneic on exertion  -  Chest xray noted to have b/l infiltrates with no evidence of consolidation,  -  findings consistent with pulmonary congestion   -  LLE Doppler negative for DVT   -  s/p Lasix 40mg IVP x1 and Decadron 6mg IVPx1, and started on remdesivir in the ED  -   Continue with Lasix 40mg IV BID   -   Continue with  home COPD meds   -   f/u repeat TTE as previous TTE 7/29/22 showed EF 55-60%, LVH, GIDD, mild pulmonary HTN  -  Cardiology : Dr Denton   -  Pulmonary : Dr. Son -  Patient was sent from PCP office for Hypoxia in the setting of SOB on exertion and b/l pitting edema,  -  Has known history of HFpEF  -  likely secondary to CHF exacerbation in the setting of medication noncompliance;   -  Noted to become dyspneic on exertion  -  Chest xray noted to have b/l infiltrates with no evidence of consolidation,  -  findings consistent with pulmonary congestion   -  LLE Doppler negative for DVT   -  s/p Lasix 40mg IVP x1 and Decadron 6mg IVPx1, and started on remdesivir in the ED  -   Continue with Lasix 40mg IV BID   -   previous TTE 7/29/22 showed EF 55-60%, LVH, GIDD, mild pulmonary HTN  -  Cardiology : Dr Denton   -  Pulmonary : Dr. Son

## 2022-10-12 NOTE — PROGRESS NOTE ADULT - PROBLEM SELECTOR PLAN 6
-  NSR on ECG  -  Continue with telemetry monitoring  -  Continue with  carvedilol 6.25mg BID for rate control  -  Continue with Eliquis for anticoagulation  -  previous TTE 7/29/22 showed EF 55-60%, LVH, GIDD, mild pulmonary HTN

## 2022-10-12 NOTE — PROGRESS NOTE ADULT - ASSESSMENT
Patient is a 89y  old female  coming from home, with PMHx of COPD (not oh home o2), HFpEF (7/29/22 EF 55-60%, LVH, GIDD, mild pulmonary HTN), Afib on Eliquis, Breast CA, HTN, and with remote h/o LLE DVT sent from PCP office for evaluation of Hypoxia, with associated SOB and YUAN x2d in the setting of medication noncompliance.  Patient has not taking her Lasix for last 5 days because she ran out  of Lasix. Per granddaughter, patient was admitted to [facility] in August for hypoxia and leg swelling, and treatment for heart failure, and discharged with an increased Lasix dose to 40mg BID.  On admission, she found to have no fever, but tachypnea, hypoxia to 84 % in Room air and positive COVID PCR. She has started on Remdesivir and Dexamethasone, and the ID consult requested to assist with further evaluation and antibiotic management.    # COVID Pneumonitis  # Acute Hypoxic Respiratory failure- on oxygen via NC    would recommend:    1. OOB ot chair   2. Continue Remdesivir and Dexamethasone to complete the course  3. Monitor ALT and kidney function while on Remdesivir  4. Supplemental oxygenation and Bronchodilator as needed  5. Supportive care including AC  6. Aspiration and COVID precautions    d/w patient and Nursing staff     Attending Attestation:    Spent more than 45 minutes on total encounter, more than 50 % of the visit was spent counseling and/or coordinating care by the Attending physician.

## 2022-10-12 NOTE — PROGRESS NOTE ADULT - ASSESSMENT
89F coming from home, ambulates with RW, with PMHx of COPD (not oh home o2), HFpEF (7/29/22 EF 55-60%, LVH, GIDD, mild pulmonary HTN), Afib on Eliquis, Breast CA, HTN, and with remote h/o LLE DVT sent from PCP office for Hypoxia, with associated SOB and YUAN x2d. Patient admitted to telemetry for AHRF likely 2/2 CHF exacerbation in the setting of medication noncompliance.      89F coming from home, ambulates with RW, with PMHx of COPD (not oh home o2), HFpEF (7/29/22 EF 55-60%, LVH, GIDD, mild pulmonary HTN), Afib on Eliquis, Breast CA, HTN, and with remote h/o LLE DVT sent from PCP office for Hypoxia, with associated SOB and YUAN x2d. Patient admitted to telemetry for AHRF likely 2/2 CHF exacerbation in the setting of medication noncompliance. Covid Positive on RDX and Dex saturating on 100% on 3L

## 2022-10-12 NOTE — PROGRESS NOTE ADULT - PROBLEM SELECTOR PLAN 2
-   Elevated ProBNP 1763   -  Trop 168, and no EKG changes   -  Takes coreg 6.25mg BID and lasix 40mg BID at home, but has not taken Lasix x5d as she ran out   -  s/p Lasix 40mg IVP x1 and Decadron 6mg IVP x 1,   -  Continue with coreg with holding parameters   -  Continue with  IV lasix 40mg, titrate based on volume status, response, and O2 titration   -   previous TTE 7/29/22 showed EF 55-60%, LVH, GIDD, mild pulmonary HTN  -  Daily weights, strict I&O

## 2022-10-12 NOTE — PROGRESS NOTE ADULT - SUBJECTIVE AND OBJECTIVE BOX
PATIENT SEEN AND EXAMINED ON :- 10/12/22  DATE OF SERVICE:   10/12/22          Interim events noted,Labs ,Radiological studies and Cardiology tests reviewed .       HOSPITAL COURSE: HPI:  89F coming from home, ambulates with RW, with PMHx of COPD (not oh home o2), HFpEF (7/29/22 EF 55-60%, LVH, GIDD, mild pulmonary HTN), Afib on Eliquis, Breast CA, HTN, and with remote h/o LLE DVT sent from PCP office for Hypoxia, with associated SOB and YUAN x2d in the setting of medication noncompliance. According to granddaughter, Macario [phone] the patient has had SOB with YUAN for the past few days, which she attributes to not taking her Lasix for 5 days because patient ran out. Patient denies any associated chest pain or cough with SOB.  Patient also c/o chronic LE swelling, recently worsening. Also stating that she has right hip and right shoulder pain which she attributes to her arthritis. Per granddaughter, patient was admitted to [facility] in August for hypoxia and leg swelling, and treatment for heart failure, and discharged with an increased Lasix dose to 40mg BID. Per granddaughter, she ran out of Lasix after not going to her f/u PCP as the patient did not want want to go d/t clinical improvement. In the ED, patient found to be COVID positive, however denies any recent sick contact, as well as no fevers, chills, congestion and cough. Covid Vacc x3.  (10 Oct 2022 23:20)      INTERIM EVENTS:Patient seen at bedside ,interim events noted.      PMH -reviewed admission note, no change since admission  HEART FAILURE: Acute[ ]Chronic[ ] Systolic[ ] Diastolic[ ] Combined Systolic and Diastolic[ ]  CAD[ ] CABG[ ] PCI[ ]  DEVICES[ ] PPM[ ] ICD[ ] ILR[ ]  ATRIAL FIBRILLATION[ ] Paroxysmal[ ] Permanent[ ] CHADS2-[  ]  JACOBO[ ] CKD1[ ] CKD2[ ] CKD3[ ] CKD4[ ] ESRD[ ]  COPD[ ] HTN[ ]   DM[ ] Type1[ ] Type 2[ ]   CVA[ ] Paresis[ ]    AMBULATION: Assisted[ ] Cane/walker[ ] Independent[ ]    MEDICATIONS  (STANDING):  anastrozole 1 milliGRAM(s) Oral daily  apixaban 5 milliGRAM(s) Oral every 12 hours  artificial  tears Solution 1 Drop(s) Both EYES four times a day  aspirin  chewable 81 milliGRAM(s) Oral daily  carvedilol 6.25 milliGRAM(s) Oral every 12 hours  dexAMETHasone  Injectable 6 milliGRAM(s) IV Push daily  ferrous    sulfate 325 milliGRAM(s) Oral daily  furosemide   Injectable 40 milliGRAM(s) IV Push every 12 hours  montelukast 10 milliGRAM(s) Oral daily  remdesivir  IVPB   IV Intermittent   remdesivir  IVPB 100 milliGRAM(s) IV Intermittent every 24 hours  simvastatin 20 milliGRAM(s) Oral at bedtime    MEDICATIONS  (PRN):  acetaminophen     Tablet .. 650 milliGRAM(s) Oral every 6 hours PRN Temp greater or equal to 38C (100.4F), Moderate Pain (4 - 6)  ALBUTerol    90 MICROgram(s) HFA Inhaler 1 Puff(s) Inhalation every 6 hours PRN Shortness of Breath and/or Wheezing  melatonin 3 milliGRAM(s) Oral at bedtime PRN Insomnia            REVIEW OF SYSTEMS:  Constitutional: [ ] fever, [ ]weight loss,  [ ]fatigue [ ]weight gain  Eyes: [ ] visual changes  Respiratory: [ ]shortness of breath;  [ ] cough, [ ]wheezing, [ ]chills, [ ]hemoptysis  Cardiovascular: [ ] chest pain, [ ]palpitations, [ ]dizziness,  [ ]leg swelling[ ]orthopnea[ ]PND  Gastrointestinal: [ ] abdominal pain, [ ]nausea, [ ]vomiting,  [ ]diarrhea [ ]Constipation [ ]Melena  Genitourinary: [ ] dysuria, [ ] hematuria [ ][place]  Neurologic: [ ] headaches [ ] tremors[ ]weakness [ ]Paralysis Right[ ] Left[ ]  Skin: [ ] itching, [ ]burning, [ ] rashes  Endocrine: [ ] heat or cold intolerance  Musculoskeletal: [ ] joint pain or swelling; [ ] muscle, back, or extremity pain  Psychiatric: [ ] depression, [ ]anxiety, [ ]mood swings, or [ ]difficulty sleeping  Hematologic: [ ] easy bruising, [ ] bleeding gums    [ ] All remaining systems negative except as per above.   [ ]Unable to obtain.  [x] No change in ROS since admission      Vital Signs Last 24 Hrs  T(C): 36.6 (12 Oct 2022 18:14), Max: 36.6 (12 Oct 2022 14:42)  T(F): 97.8 (12 Oct 2022 18:14), Max: 97.9 (12 Oct 2022 14:42)  HR: 81 (12 Oct 2022 18:14) (60 - 95)  BP: 135/79 (12 Oct 2022 18:14) (110/70 - 135/79)  BP(mean): --  RR: 16 (12 Oct 2022 18:14) (16 - 18)  SpO2: 100% (12 Oct 2022 18:14) (99% - 100%)    Parameters below as of 12 Oct 2022 18:14  Patient On (Oxygen Delivery Method): nasal cannula  O2 Flow (L/min): 3    I&O's Summary    11 Oct 2022 07:01  -  12 Oct 2022 07:00  --------------------------------------------------------  IN: 0 mL / OUT: 450 mL / NET: -450 mL        PHYSICAL EXAM:  General: No acute distress BMI-  HEENT: EOMI, PERRL  Neck: Supple, [ ] JVD  Lungs: Equal air entry bilaterally; [ ] rales [ ] wheezing [ ] rhonchi  Heart: Regular rate and rhythm; [x ] murmur   2/6 [ x] systolic [ ] diastolic [ ] radiation[ ] rubs [ ]  gallops  Abdomen: Nontender, bowel sounds present  Extremities: No clubbing, cyanosis, [ ] edema [ ]Pulses  equal and intact  Nervous system:  Alert & Oriented X3, no focal deficits  Psychiatric: Normal affect  Skin: No rashes or lesions    LABS:  10-12    141  |  99  |  22<H>  ----------------------------<  155<H>  5.0   |  37<H>  |  0.93    Ca    8.6      12 Oct 2022 06:21  Phos  4.2     10-11  Mg     2.1     10-11    TPro  6.9  /  Alb  3.0<L>  /  TBili  0.3  /  DBili  0.1  /  AST  17  /  ALT  18  /  AlkPhos  128<H>  10-12    Creatinine Trend: 0.93<--, 0.91<--, 0.96<--, 0.89<--                        13.1   6.22  )-----------( 208      ( 12 Oct 2022 06:21 )             43.3     PT/INR - ( 12 Oct 2022 07:31 )   PT: 19.7 sec;   INR: 1.65 ratio

## 2022-10-12 NOTE — PROGRESS NOTE ADULT - SUBJECTIVE AND OBJECTIVE BOX
NP Note discussed with  Primary Attending    Patient is a 89y old  Female who presents with a chief complaint of SOB (12 Oct 2022 10:12)      INTERVAL HPI/OVERNIGHT EVENTS: no new complaints    MEDICATIONS  (STANDING):  anastrozole 1 milliGRAM(s) Oral daily  apixaban 5 milliGRAM(s) Oral every 12 hours  artificial  tears Solution 1 Drop(s) Both EYES four times a day  aspirin  chewable 81 milliGRAM(s) Oral daily  carvedilol 6.25 milliGRAM(s) Oral every 12 hours  dexAMETHasone  Injectable 6 milliGRAM(s) IV Push daily  ferrous    sulfate 325 milliGRAM(s) Oral daily  furosemide   Injectable 40 milliGRAM(s) IV Push every 12 hours  montelukast 10 milliGRAM(s) Oral daily  potassium chloride   Powder 40 milliEquivalent(s) Oral once  remdesivir  IVPB   IV Intermittent   remdesivir  IVPB 100 milliGRAM(s) IV Intermittent every 24 hours  simvastatin 20 milliGRAM(s) Oral at bedtime    MEDICATIONS  (PRN):  acetaminophen     Tablet .. 650 milliGRAM(s) Oral every 6 hours PRN Temp greater or equal to 38C (100.4F), Moderate Pain (4 - 6)  ALBUTerol    90 MICROgram(s) HFA Inhaler 1 Puff(s) Inhalation every 6 hours PRN Shortness of Breath and/or Wheezing  melatonin 3 milliGRAM(s) Oral at bedtime PRN Insomnia      __________________________________________________  REVIEW OF SYSTEMS:    CONSTITUTIONAL: No fever,   EYES: no acute visual disturbances  NECK: No pain or stiffness  RESPIRATORY: No cough; No shortness of breath  CARDIOVASCULAR: No chest pain, no palpitations  GASTROINTESTINAL: No pain. No nausea or vomiting; No diarrhea   NEUROLOGICAL: No headache or numbness, no tremors  MUSCULOSKELETAL: No joint pain, no muscle pain  GENITOURINARY: no dysuria, no frequency, no hesitancy  PSYCHIATRY: no depression , no anxiety  ALL OTHER  ROS negative        Vital Signs Last 24 Hrs  T(C): 36 (12 Oct 2022 06:13), Max: 36.4 (11 Oct 2022 18:01)  T(F): 96.8 (12 Oct 2022 06:13), Max: 97.6 (11 Oct 2022 18:01)  HR: 60 (12 Oct 2022 06:13) (60 - 95)  BP: 110/70 (12 Oct 2022 06:13) (110/70 - 126/79)  BP(mean): --  RR: 18 (12 Oct 2022 06:13) (18 - 20)  SpO2: 100% (12 Oct 2022 06:13) (98% - 100%)    Parameters below as of 12 Oct 2022 06:13  Patient On (Oxygen Delivery Method): nasal cannula  O2 Flow (L/min): 3      ________________________________________________  PHYSICAL EXAM:  GENERAL: NAD  HEENT: Normocephalic;  conjunctivae and sclerae clear; moist mucous membranes;   NECK : supple  CHEST/LUNG: Clear to auscultation bilaterally with good air entry   HEART: S1 S2  regular; no murmurs, gallops or rubs  ABDOMEN: Soft, Nontender, Nondistended; Bowel sounds present  EXTREMITIES: no cyanosis; no edema; no calf tenderness  SKIN: warm and dry; no rash  NERVOUS SYSTEM:  Awake and alert; Oriented  to place, person and time ; no new deficits    _________________________________________________  LABS:                        13.1   6.22  )-----------( 208      ( 12 Oct 2022 06:21 )             43.3     10-12    141  |  99  |  22<H>  ----------------------------<  155<H>  5.0   |  37<H>  |  0.93    Ca    8.6      12 Oct 2022 06:21  Phos  4.2     10-11  Mg     2.1     10-11    TPro  6.9  /  Alb  3.0<L>  /  TBili  0.3  /  DBili  0.1  /  AST  17  /  ALT  18  /  AlkPhos  128<H>  10-12    PT/INR - ( 12 Oct 2022 07:31 )   PT: 19.7 sec;   INR: 1.65 ratio         PTT - ( 10 Oct 2022 19:00 )  PTT:41.1 sec  Urinalysis Basic - ( 11 Oct 2022 06:05 )    Color: Yellow / Appearance: Clear / S.005 / pH: x  Gluc: x / Ketone: Negative  / Bili: Negative / Urobili: Negative   Blood: x / Protein: Negative / Nitrite: Negative   Leuk Esterase: Negative / RBC: 0-2 /HPF / WBC 3-5 /HPF   Sq Epi: x / Non Sq Epi: Few /HPF / Bacteria: Trace /HPF      CAPILLARY BLOOD GLUCOSE      POCT Blood Glucose.: 185 mg/dL (12 Oct 2022 11:19)  POCT Blood Glucose.: 137 mg/dL (12 Oct 2022 08:50)        RADIOLOGY & ADDITIONAL TESTS:  < from: US Duplex Venous Lower Ext Ltd, Left (10.10.22 @ 18:42) >  IMPRESSION:  Limited study with limited compression secondary to swelling.  No obvious left lower extremity deep venous thrombosis.    < end of copied text >    < from: Xray Chest 1 View AP/PA (10.10.22 @ 18:43) >  IMPRESSION:   No radiographic evidence of active chest disease.      < end of copied text >      Imaging Personally Reviewed:  YES/NO    Consultant(s) Notes Reviewed:   YES/ No    Care Discussed with Consultants :     Plan of care was discussed with patient and /or primary care giver; all questions and concerns were addressed and care was aligned with patient's wishes.     NP Note discussed with  Primary Attending    Patient is a 89y old  Female who presents with a chief complaint of SOB (12 Oct 2022 10:12)      INTERVAL HPI/OVERNIGHT EVENTS: no new complaints    MEDICATIONS  (STANDING):  anastrozole 1 milliGRAM(s) Oral daily  apixaban 5 milliGRAM(s) Oral every 12 hours  artificial  tears Solution 1 Drop(s) Both EYES four times a day  aspirin  chewable 81 milliGRAM(s) Oral daily  carvedilol 6.25 milliGRAM(s) Oral every 12 hours  dexAMETHasone  Injectable 6 milliGRAM(s) IV Push daily  ferrous    sulfate 325 milliGRAM(s) Oral daily  furosemide   Injectable 40 milliGRAM(s) IV Push every 12 hours  montelukast 10 milliGRAM(s) Oral daily  potassium chloride   Powder 40 milliEquivalent(s) Oral once  remdesivir  IVPB   IV Intermittent   remdesivir  IVPB 100 milliGRAM(s) IV Intermittent every 24 hours  simvastatin 20 milliGRAM(s) Oral at bedtime    MEDICATIONS  (PRN):  acetaminophen     Tablet .. 650 milliGRAM(s) Oral every 6 hours PRN Temp greater or equal to 38C (100.4F), Moderate Pain (4 - 6)  ALBUTerol    90 MICROgram(s) HFA Inhaler 1 Puff(s) Inhalation every 6 hours PRN Shortness of Breath and/or Wheezing  melatonin 3 milliGRAM(s) Oral at bedtime PRN Insomnia      __________________________________________________  REVIEW OF SYSTEMS:    CONSTITUTIONAL: No fever,   EYES: no acute visual disturbances  NECK: No pain or stiffness  RESPIRATORY: No cough; No shortness of breath  CARDIOVASCULAR: No chest pain, no palpitations  GASTROINTESTINAL: No pain. No nausea or vomiting; No diarrhea   NEUROLOGICAL: No headache or numbness, no tremors  MUSCULOSKELETAL: No joint pain, no muscle pain  GENITOURINARY: no dysuria, no frequency, no hesitancy  PSYCHIATRY: no depression , no anxiety  ALL OTHER  ROS negative        Vital Signs Last 24 Hrs  T(C): 36 (12 Oct 2022 06:13), Max: 36.4 (11 Oct 2022 18:01)  T(F): 96.8 (12 Oct 2022 06:13), Max: 97.6 (11 Oct 2022 18:01)  HR: 60 (12 Oct 2022 06:13) (60 - 95)  BP: 110/70 (12 Oct 2022 06:13) (110/70 - 126/79)  BP(mean): --  RR: 18 (12 Oct 2022 06:13) (18 - 20)  SpO2: 100% (12 Oct 2022 06:13) (98% - 100%)    Parameters below as of 12 Oct 2022 06:13  Patient On (Oxygen Delivery Method): nasal cannula  O2 Flow (L/min): 3      ________________________________________________  PHYSICAL EXAM:  GENERAL: NAD  HEENT: Normocephalic;  conjunctivae and sclerae clear; moist mucous membranes;   NECK : supple  CHEST/LUNG: Clear to auscultation bilaterally with good air entry   HEART: S1 S2  regular; no murmurs, gallops or rubs  ABDOMEN: Soft, Nontender, Nondistended; Bowel sounds present  EXTREMITIES: no cyanosis; B/L  edema; no calf tenderness  SKIN: warm and dry; no rash  NERVOUS SYSTEM:  Awake and alert;  ; no new deficits    _________________________________________________  LABS:                        13.1   6.22  )-----------( 208      ( 12 Oct 2022 06:21 )             43.3     10-12    141  |  99  |  22<H>  ----------------------------<  155<H>  5.0   |  37<H>  |  0.93    Ca    8.6      12 Oct 2022 06:21  Phos  4.2     10-11  Mg     2.1     10-11    TPro  6.9  /  Alb  3.0<L>  /  TBili  0.3  /  DBili  0.1  /  AST  17  /  ALT  18  /  AlkPhos  128<H>  10-12    PT/INR - ( 12 Oct 2022 07:31 )   PT: 19.7 sec;   INR: 1.65 ratio         PTT - ( 10 Oct 2022 19:00 )  PTT:41.1 sec  Urinalysis Basic - ( 11 Oct 2022 06:05 )    Color: Yellow / Appearance: Clear / S.005 / pH: x  Gluc: x / Ketone: Negative  / Bili: Negative / Urobili: Negative   Blood: x / Protein: Negative / Nitrite: Negative   Leuk Esterase: Negative / RBC: 0-2 /HPF / WBC 3-5 /HPF   Sq Epi: x / Non Sq Epi: Few /HPF / Bacteria: Trace /HPF      CAPILLARY BLOOD GLUCOSE      POCT Blood Glucose.: 185 mg/dL (12 Oct 2022 11:19)  POCT Blood Glucose.: 137 mg/dL (12 Oct 2022 08:50)        RADIOLOGY & ADDITIONAL TESTS:  < from: US Duplex Venous Lower Ext Ltd, Left (10.10.22 @ 18:42) >  IMPRESSION:  Limited study with limited compression secondary to swelling.  No obvious left lower extremity deep venous thrombosis.    < end of copied text >    < from: Xray Chest 1 View AP/PA (10.10.22 @ 18:43) >  IMPRESSION:   No radiographic evidence of active chest disease.      < end of copied text >      Imaging Personally Reviewed:  YES/NO    Consultant(s) Notes Reviewed:   YES/ No    Care Discussed with Consultants :     Plan of care was discussed with patient and /or primary care giver; all questions and concerns were addressed and care was aligned with patient's wishes.

## 2022-10-12 NOTE — PROGRESS NOTE ADULT - PROBLEM SELECTOR PLAN 3
-   Elevated potassium of  6.3 on admission  -   No EKG changes noted  -  likely in the setting of CHF exacerbation d/t medication noncompliance  -   hyperkalemia cocktail  -  Continue with Lasix twice daily  -  Repeat potassium this morning 5.6  -  Repeat pending -   Elevated potassium of  6.3 on admission  -   No EKG changes noted  -  likely in the setting of CHF exacerbation d/t medication noncompliance  -   hyperkalemia cocktail  -  Continue with Lasix twice daily

## 2022-10-12 NOTE — PROGRESS NOTE ADULT - PROBLEM SELECTOR PLAN 1
-  Patient was sent from PCP office for Hypoxia in the setting of SOB on exertion and b/l pitting edema,  -  Has known history of HFpEF  -  likely secondary to CHF exacerbation in the setting of medication noncompliance;   -  Noted to become dyspneic on exertion  -  Chest xray noted to have b/l infiltrates with no evidence of consolidation,  -  findings consistent with pulmonary congestion   -  LLE Doppler negative for DVT   -  s/p Lasix 40mg IVP x1 and Decadron 6mg IVPx1, and started on remdesivir in the ED  -   Continue with Lasix 40mg IV BID   -   previous TTE 7/29/22 showed EF 55-60%, LVH, GIDD, mild pulmonary HTN

## 2022-10-13 DIAGNOSIS — E83.51 HYPOCALCEMIA: ICD-10-CM

## 2022-10-13 DIAGNOSIS — I27.20 PULMONARY HYPERTENSION, UNSPECIFIED: ICD-10-CM

## 2022-10-13 LAB
-  AMIKACIN: SIGNIFICANT CHANGE UP
-  AMOXICILLIN/CLAVULANIC ACID: SIGNIFICANT CHANGE UP
-  AMPICILLIN/SULBACTAM: SIGNIFICANT CHANGE UP
-  AMPICILLIN: SIGNIFICANT CHANGE UP
-  AZTREONAM: SIGNIFICANT CHANGE UP
-  CEFAZOLIN: SIGNIFICANT CHANGE UP
-  CEFEPIME: SIGNIFICANT CHANGE UP
-  CEFTRIAXONE: SIGNIFICANT CHANGE UP
-  CIPROFLOXACIN: SIGNIFICANT CHANGE UP
-  ERTAPENEM: SIGNIFICANT CHANGE UP
-  GENTAMICIN: SIGNIFICANT CHANGE UP
-  IMIPENEM: SIGNIFICANT CHANGE UP
-  LEVOFLOXACIN: SIGNIFICANT CHANGE UP
-  MEROPENEM: SIGNIFICANT CHANGE UP
-  NITROFURANTOIN: SIGNIFICANT CHANGE UP
-  PIPERACILLIN/TAZOBACTAM: SIGNIFICANT CHANGE UP
-  TIGECYCLINE: SIGNIFICANT CHANGE UP
-  TOBRAMYCIN: SIGNIFICANT CHANGE UP
-  TRIMETHOPRIM/SULFAMETHOXAZOLE: SIGNIFICANT CHANGE UP
ALBUMIN SERPL ELPH-MCNC: 1.5 G/DL — LOW (ref 3.5–5)
ALBUMIN SERPL ELPH-MCNC: 3.2 G/DL — LOW (ref 3.5–5)
ALP SERPL-CCNC: 116 U/L — SIGNIFICANT CHANGE UP (ref 40–120)
ALP SERPL-CCNC: 127 U/L — HIGH (ref 40–120)
ALT FLD-CCNC: 19 U/L DA — SIGNIFICANT CHANGE UP (ref 10–60)
ALT FLD-CCNC: 22 U/L DA — SIGNIFICANT CHANGE UP (ref 10–60)
ANION GAP SERPL CALC-SCNC: 15 MMOL/L — SIGNIFICANT CHANGE UP (ref 5–17)
ANION GAP SERPL CALC-SCNC: 2 MMOL/L — LOW (ref 5–17)
AST SERPL-CCNC: 22 U/L — SIGNIFICANT CHANGE UP (ref 10–40)
AST SERPL-CCNC: 37 U/L — SIGNIFICANT CHANGE UP (ref 10–40)
BILIRUB SERPL-MCNC: 0.4 MG/DL — SIGNIFICANT CHANGE UP (ref 0.2–1.2)
BILIRUB SERPL-MCNC: 0.4 MG/DL — SIGNIFICANT CHANGE UP (ref 0.2–1.2)
BUN SERPL-MCNC: 17 MG/DL — SIGNIFICANT CHANGE UP (ref 7–18)
BUN SERPL-MCNC: 33 MG/DL — HIGH (ref 7–18)
CALCIUM SERPL-MCNC: 5.3 MG/DL — CRITICAL LOW (ref 8.4–10.5)
CALCIUM SERPL-MCNC: 8.5 MG/DL — SIGNIFICANT CHANGE UP (ref 8.4–10.5)
CHLORIDE SERPL-SCNC: 95 MMOL/L — LOW (ref 96–108)
CHLORIDE SERPL-SCNC: 99 MMOL/L — SIGNIFICANT CHANGE UP (ref 96–108)
CO2 SERPL-SCNC: 25 MMOL/L — SIGNIFICANT CHANGE UP (ref 22–31)
CO2 SERPL-SCNC: 41 MMOL/L — HIGH (ref 22–31)
CREAT SERPL-MCNC: 0.95 MG/DL — SIGNIFICANT CHANGE UP (ref 0.5–1.3)
CREAT SERPL-MCNC: 1.02 MG/DL — SIGNIFICANT CHANGE UP (ref 0.5–1.3)
CULTURE RESULTS: SIGNIFICANT CHANGE UP
D DIMER BLD IA.RAPID-MCNC: <150 NG/ML DDU — SIGNIFICANT CHANGE UP
EGFR: 52 ML/MIN/1.73M2 — LOW
EGFR: 57 ML/MIN/1.73M2 — LOW
FERRITIN SERPL-MCNC: 65 NG/ML — SIGNIFICANT CHANGE UP (ref 15–150)
GLUCOSE BLDC GLUCOMTR-MCNC: 185 MG/DL — HIGH (ref 70–99)
GLUCOSE BLDC GLUCOMTR-MCNC: 196 MG/DL — HIGH (ref 70–99)
GLUCOSE BLDC GLUCOMTR-MCNC: 233 MG/DL — HIGH (ref 70–99)
GLUCOSE BLDC GLUCOMTR-MCNC: 236 MG/DL — HIGH (ref 70–99)
GLUCOSE SERPL-MCNC: 183 MG/DL — HIGH (ref 70–99)
GLUCOSE SERPL-MCNC: 78 MG/DL — SIGNIFICANT CHANGE UP (ref 70–99)
HCT VFR BLD CALC: 42.2 % — SIGNIFICANT CHANGE UP (ref 34.5–45)
HGB BLD-MCNC: 13.1 G/DL — SIGNIFICANT CHANGE UP (ref 11.5–15.5)
INR BLD: 1.58 RATIO — HIGH (ref 0.88–1.16)
LDH SERPL L TO P-CCNC: 507 U/L — HIGH (ref 120–225)
MAGNESIUM SERPL-MCNC: 2 MG/DL — SIGNIFICANT CHANGE UP (ref 1.6–2.6)
MCHC RBC-ENTMCNC: 31 GM/DL — LOW (ref 32–36)
MCHC RBC-ENTMCNC: 31.2 PG — SIGNIFICANT CHANGE UP (ref 27–34)
MCV RBC AUTO: 100.5 FL — HIGH (ref 80–100)
METHOD TYPE: SIGNIFICANT CHANGE UP
NRBC # BLD: 0 /100 WBCS — SIGNIFICANT CHANGE UP (ref 0–0)
ORGANISM # SPEC MICROSCOPIC CNT: SIGNIFICANT CHANGE UP
ORGANISM # SPEC MICROSCOPIC CNT: SIGNIFICANT CHANGE UP
PHOSPHATE SERPL-MCNC: 2.5 MG/DL — SIGNIFICANT CHANGE UP (ref 2.5–4.5)
PLATELET # BLD AUTO: 210 K/UL — SIGNIFICANT CHANGE UP (ref 150–400)
POTASSIUM SERPL-MCNC: 4.9 MMOL/L — SIGNIFICANT CHANGE UP (ref 3.5–5.3)
POTASSIUM SERPL-MCNC: 5.7 MMOL/L — HIGH (ref 3.5–5.3)
POTASSIUM SERPL-SCNC: 4.9 MMOL/L — SIGNIFICANT CHANGE UP (ref 3.5–5.3)
POTASSIUM SERPL-SCNC: 5.7 MMOL/L — HIGH (ref 3.5–5.3)
PROCALCITONIN SERPL-MCNC: 0.06 NG/ML — SIGNIFICANT CHANGE UP (ref 0.02–0.1)
PROT SERPL-MCNC: 6.8 G/DL — SIGNIFICANT CHANGE UP (ref 6–8.3)
PROT SERPL-MCNC: 7.3 G/DL — SIGNIFICANT CHANGE UP (ref 6–8.3)
PROTHROM AB SERPL-ACNC: 18.9 SEC — HIGH (ref 10.5–13.4)
RBC # BLD: 4.2 M/UL — SIGNIFICANT CHANGE UP (ref 3.8–5.2)
RBC # FLD: 13.5 % — SIGNIFICANT CHANGE UP (ref 10.3–14.5)
SODIUM SERPL-SCNC: 138 MMOL/L — SIGNIFICANT CHANGE UP (ref 135–145)
SODIUM SERPL-SCNC: 139 MMOL/L — SIGNIFICANT CHANGE UP (ref 135–145)
SPECIMEN SOURCE: SIGNIFICANT CHANGE UP
WBC # BLD: 8.94 K/UL — SIGNIFICANT CHANGE UP (ref 3.8–10.5)
WBC # FLD AUTO: 8.94 K/UL — SIGNIFICANT CHANGE UP (ref 3.8–10.5)

## 2022-10-13 RX ORDER — DEXTROSE 50 % IN WATER 50 %
12.5 SYRINGE (ML) INTRAVENOUS ONCE
Refills: 0 | Status: DISCONTINUED | OUTPATIENT
Start: 2022-10-13 | End: 2022-10-20

## 2022-10-13 RX ORDER — SODIUM CHLORIDE 9 MG/ML
1000 INJECTION, SOLUTION INTRAVENOUS
Refills: 0 | Status: DISCONTINUED | OUTPATIENT
Start: 2022-10-13 | End: 2022-10-20

## 2022-10-13 RX ORDER — SODIUM ZIRCONIUM CYCLOSILICATE 10 G/10G
5 POWDER, FOR SUSPENSION ORAL ONCE
Refills: 0 | Status: COMPLETED | OUTPATIENT
Start: 2022-10-13 | End: 2022-10-13

## 2022-10-13 RX ORDER — DEXTROSE 50 % IN WATER 50 %
25 SYRINGE (ML) INTRAVENOUS ONCE
Refills: 0 | Status: DISCONTINUED | OUTPATIENT
Start: 2022-10-13 | End: 2022-10-20

## 2022-10-13 RX ORDER — GLUCAGON INJECTION, SOLUTION 0.5 MG/.1ML
1 INJECTION, SOLUTION SUBCUTANEOUS ONCE
Refills: 0 | Status: DISCONTINUED | OUTPATIENT
Start: 2022-10-13 | End: 2022-10-20

## 2022-10-13 RX ORDER — DEXTROSE 50 % IN WATER 50 %
15 SYRINGE (ML) INTRAVENOUS ONCE
Refills: 0 | Status: DISCONTINUED | OUTPATIENT
Start: 2022-10-13 | End: 2022-10-20

## 2022-10-13 RX ORDER — INSULIN LISPRO 100/ML
VIAL (ML) SUBCUTANEOUS
Refills: 0 | Status: DISCONTINUED | OUTPATIENT
Start: 2022-10-13 | End: 2022-10-20

## 2022-10-13 RX ADMIN — APIXABAN 5 MILLIGRAM(S): 2.5 TABLET, FILM COATED ORAL at 18:02

## 2022-10-13 RX ADMIN — APIXABAN 5 MILLIGRAM(S): 2.5 TABLET, FILM COATED ORAL at 05:29

## 2022-10-13 RX ADMIN — Medication 40 MILLIGRAM(S): at 18:02

## 2022-10-13 RX ADMIN — ANASTROZOLE 1 MILLIGRAM(S): 1 TABLET ORAL at 12:52

## 2022-10-13 RX ADMIN — CARVEDILOL PHOSPHATE 6.25 MILLIGRAM(S): 80 CAPSULE, EXTENDED RELEASE ORAL at 18:02

## 2022-10-13 RX ADMIN — SIMVASTATIN 20 MILLIGRAM(S): 20 TABLET, FILM COATED ORAL at 21:45

## 2022-10-13 RX ADMIN — Medication 6 MILLIGRAM(S): at 05:28

## 2022-10-13 RX ADMIN — Medication 325 MILLIGRAM(S): at 12:53

## 2022-10-13 RX ADMIN — Medication 1 DROP(S): at 12:52

## 2022-10-13 RX ADMIN — Medication 40 MILLIGRAM(S): at 05:28

## 2022-10-13 RX ADMIN — CARVEDILOL PHOSPHATE 6.25 MILLIGRAM(S): 80 CAPSULE, EXTENDED RELEASE ORAL at 05:28

## 2022-10-13 RX ADMIN — Medication 1 DROP(S): at 18:02

## 2022-10-13 RX ADMIN — Medication 1: at 18:01

## 2022-10-13 RX ADMIN — Medication 1 DROP(S): at 05:30

## 2022-10-13 RX ADMIN — SODIUM ZIRCONIUM CYCLOSILICATE 5 GRAM(S): 10 POWDER, FOR SUSPENSION ORAL at 08:55

## 2022-10-13 RX ADMIN — Medication 81 MILLIGRAM(S): at 12:53

## 2022-10-13 RX ADMIN — MONTELUKAST 10 MILLIGRAM(S): 4 TABLET, CHEWABLE ORAL at 12:54

## 2022-10-13 NOTE — CHART NOTE - NSCHARTNOTEFT_GEN_A_CORE
NP called ER contact-Granddtr Mirela Churchill and provided update.  Granddtr verbalized understanding.      Per Granddtr pt does not have home oxygen.      Per Granddtr pt does have a CDPAP, "one of her dtrs."  Per Granddtr pt has 7 children.  NP discussed advanced directives.  However, Grandtr explained pt does not wish to discuss and the family has attempted.  NP verbalized understanding.

## 2022-10-13 NOTE — PROGRESS NOTE ADULT - SUBJECTIVE AND OBJECTIVE BOX
Patient is a 89y old  Female who presents with a chief complaint of SOB (12 Oct 2022 14:20)    Patient is awake, alert, comfortable, not in acute distress on RA, clinically stable. Patient is on isolation due to COVID-19 positive infection.     INTERVAL HPI/OVERNIGHT EVENTS:      VITAL SIGNS:  T(F): 97.4 (10-13-22 @ 05:15)  HR: 95 (10-13-22 @ 09:33)  BP: 121/85 (10-13-22 @ 09:33)  RR: 18 (10-13-22 @ 09:33)  SpO2: 100% (10-13-22 @ 09:33)  Wt(kg): --  I&O's Detail    12 Oct 2022 07:01  -  13 Oct 2022 07:00  --------------------------------------------------------  IN:  Total IN: 0 mL    OUT:    Voided (mL): 1900 mL  Total OUT: 1900 mL    Total NET: -1900 mL              REVIEW OF SYSTEMS:    CONSTITUTIONAL:  No fevers, chills, sweats    HEENT:  Eyes:  No diplopia or blurred vision. ENT:  No earache, sore throat or runny nose.    CARDIOVASCULAR:  No pressure, squeezing, tightness, or heaviness about the chest; no palpitations.    RESPIRATORY:  Per HPI    GASTROINTESTINAL:  No abdominal pain, nausea, vomiting or diarrhea.    GENITOURINARY:  No dysuria, frequency or urgency.    NEUROLOGIC:  No paresthesias, fasciculations, seizures or weakness.    PSYCHIATRIC:  No disorder of thought or mood.      PHYSICAL EXAM:    Constitutional: Well developed and nourished  Eyes:Perrla  ENMT: normal  Neck:supple  Respiratory: good air entry  Cardiovascular: S1 S2 regular  Gastrointestinal: Soft, Non tender  Extremities: Bilateral leg edema   Vascular:normal  Neurological:Awake, alert,Ox3  Musculoskeletal:Normal      MEDICATIONS  (STANDING):  anastrozole 1 milliGRAM(s) Oral daily  apixaban 5 milliGRAM(s) Oral every 12 hours  artificial  tears Solution 1 Drop(s) Both EYES four times a day  aspirin  chewable 81 milliGRAM(s) Oral daily  carvedilol 6.25 milliGRAM(s) Oral every 12 hours  dexAMETHasone  Injectable 6 milliGRAM(s) IV Push daily  ferrous    sulfate 325 milliGRAM(s) Oral daily  furosemide   Injectable 40 milliGRAM(s) IV Push every 12 hours  montelukast 10 milliGRAM(s) Oral daily  remdesivir  IVPB   IV Intermittent   remdesivir  IVPB 100 milliGRAM(s) IV Intermittent every 24 hours  simvastatin 20 milliGRAM(s) Oral at bedtime    MEDICATIONS  (PRN):  acetaminophen     Tablet .. 650 milliGRAM(s) Oral every 6 hours PRN Temp greater or equal to 38C (100.4F), Moderate Pain (4 - 6)  ALBUTerol    90 MICROgram(s) HFA Inhaler 1 Puff(s) Inhalation every 6 hours PRN Shortness of Breath and/or Wheezing  melatonin 3 milliGRAM(s) Oral at bedtime PRN Insomnia      Allergies    losartan (Angioedema)    Intolerances    Chicken (Stomach Upset)      LABS:                        13.1   8.94  )-----------( 210      ( 13 Oct 2022 05:41 )             42.2     10-13    139  |  99  |  17  ----------------------------<  78  5.7<H>   |  25  |  1.02    Ca    5.3<LL>      13 Oct 2022 05:41    TPro  6.8  /  Alb  1.5<L>  /  TBili  0.4  /  DBili  x   /  AST  37  /  ALT  19  /  AlkPhos  116  10-13    PT/INR - ( 13 Oct 2022 05:41 )   PT: 18.9 sec;   INR: 1.58 ratio                   CAPILLARY BLOOD GLUCOSE      POCT Blood Glucose.: 236 mg/dL (13 Oct 2022 08:27)  POCT Blood Glucose.: 185 mg/dL (12 Oct 2022 11:19)    pro-bnp -- 10-13 @ 05:41     d-dimer <150  10-13 @ 05:41  pro-bnp 1763 10-10 @ 17:50     d-dimer --  10-10 @ 17:50      RADIOLOGY & ADDITIONAL TESTS:    CXR:  < from: Xray Chest 1 View AP/PA (10.10.22 @ 18:43) >  IMPRESSION:   No radiographic evidence of active chest disease.    --- End of Report ---    < end of copied text >      Ct scan chest:    ekg;    echo:  < from: Transthoracic Echocardiogram (07.29.22 @ 08:30) >  CONCLUSIONS:  1. Normal mitral valve.  2. Calcified trileaflet aortic valve with normal opening.  3. Aortic Root: 4.0 cm.  4. Normal left atrium.  LA volume index = 32 cc/m2.  5. Mild concentric left ventricular hypertrophy.  6. Normal Left Ventricular Systolic Function,  (EF = 55 to  60%)  7. Grade I diastolic dysfunction (Impaired relaxation,  mild).  8. Normal right atrium.  9. Normal right ventricular size and function.  10. RV systolic pressure is mildly increased at  39 mm Hg.  11. Normal tricuspid valve.  12. There is trace pulmonic regurgitation.  13. Normal pericardium with no pericardial effusion.    < end of copied text >      < from: US Duplex Venous Lower Ext Ltd, Left (10.10.22 @ 18:42) >  IMPRESSION:  Limited study with limited compression secondary to swelling.  No obvious left lower extremity deep venous thrombosis.    --- End of Report ---    < end of copied text >

## 2022-10-13 NOTE — PROGRESS NOTE ADULT - ASSESSMENT
89 year old Female, from home, ambulates with RW, with PMH of COPD (not on home oxygen), HFpEF (7/29/22 EF 55-60%, LVH, GIDD, mild pulmonary HTN), Afib on Eliquis, Breast CA, HTN, and remote h/o LLE DVT sent from PCP office for Hypoxia, with associated SOB and YUAN x2d.  Admitted for AHRF likely 2/2 CHF exacerbation in the setting of medication noncompliance & COVID infection.

## 2022-10-13 NOTE — PROGRESS NOTE ADULT - ASSESSMENT
89 year old Female, from home, ambulates with RW, with PMH of COPD (not oh home o2), HFpEF (7/29/22 EF 55-60%, LVH, GIDD, mild pulmonary HTN), Afib on Eliquis, Breast CA, HTN, and remote h/o LLE DVT sent from PCP office for Hypoxia, with associated SOB and YUAN x2d.  Admitted for AHRF likely 2/2 CHF exacerbation in the setting of medication noncompliance & COVID infection.      89 year old Female, from home, ambulates with RW, with PMH of COPD (not on home oxygen), HFpEF (7/29/22 EF 55-60%, LVH, GIDD, mild pulmonary HTN), Afib on Eliquis, Breast CA, HTN, and remote h/o LLE DVT sent from PCP office for Hypoxia, with associated SOB and YUAN x2d.  Admitted for AHRF likely 2/2 CHF exacerbation in the setting of medication noncompliance & COVID infection.

## 2022-10-13 NOTE — PROGRESS NOTE ADULT - PROBLEM SELECTOR PLAN 3
- Elevated Potassium=6.3 on admission  - No EKG changes noted  - Likely in the setting of CHF exacerbation d/t medication noncompliance  - S/p Hyperkalemia cocktail  - S/p Lokelma today  - Continue to monitor Potassium  - CMP in AM-f/u results

## 2022-10-13 NOTE — PROGRESS NOTE ADULT - PROBLEM SELECTOR PLAN 1
- Patient was sent from PCP office for Hypoxia in the setting of SOB on exertion and b/l pitting edema,  - Known history of HFpEF  - Likely secondary to CHF exacerbation in the setting of medication noncompliance;   - ProBNP 1763   - Trop 168  - SpO2  84 % on RA in the ED, dyspneic on exertion.  S/p Lasix 40mg IVP x1 and Decadron 6mg IVPx1, and started on Remdesivir in the ED  - CXR noted to have b/l infiltrates with no evidence of consolidation,  -  findings consistent with pulmonary congestion   - S/p LLE Doppler negative for DVT   - C/w Lasix 40mg IV BID   - C/w home COPD meds   - Cardiology-Dr. Denton consulted, appreciated    - Pulmonary-Dr. Son consulted, appreciated - Patient was sent from PCP office for Hypoxia in the setting of SOB on exertion and b/l pitting edema,  - Known history of HFpEF  - Likely secondary to CHF exacerbation in the setting of medication noncompliance;   - ProBNP 1763   - Trop 168  - SpO2  84 % on RA in the ED, dyspneic on exertion.  S/p Lasix 40mg IVP x1 and Decadron 6mg IVPx1, and started on Remdesivir in the ED  - CXR noted to have b/l infiltrates with no evidence of consolidation,  -  findings consistent with pulmonary congestion   - S/p LLE Doppler negative for DVT   - C/w Lasix 40mg IV BID   - C/w Albuterol & Singulair  - Cardiology-Dr. Denton consulted, appreciated    - Pulmonary-Dr. Son consulted, appreciated - Corrected Ca=7.3.  Supplemented  - Continue to monitor Ca  - Vit D in AM-F/u results

## 2022-10-13 NOTE — PROGRESS NOTE ADULT - PROBLEM SELECTOR PLAN 4
- Likely in the setting of CHF exacerbation in the setting of medication noncompliance   - At baseline patient ambulates with rolling walker  - Reports chronic LE swelling  - S/p LLE doppler negative DVT  - C/o chronic right hip pain, denies fall or trauma   - PT recommended [facility] but family will take pt home.  Has a CDPAP. - Elevated Potassium=6.3 on admission  - No EKG changes noted  - Likely in the setting of CHF exacerbation d/t medication noncompliance  - S/p Hyperkalemia cocktail  - S/p Lokelma today  - Continue to monitor Potassium  - CMP in AM-f/u results

## 2022-10-13 NOTE — PROGRESS NOTE ADULT - ASSESSMENT
Patient is a 89y  old female  coming from home, with PMHx of COPD (not oh home o2), HFpEF (7/29/22 EF 55-60%, LVH, GIDD, mild pulmonary HTN), Afib on Eliquis, Breast CA, HTN, and with remote h/o LLE DVT sent from PCP office for evaluation of Hypoxia, with associated SOB and YUAN x2d in the setting of medication noncompliance.  Patient has not taking her Lasix for last 5 days because she ran out  of Lasix. Per granddaughter, patient was admitted to [facility] in August for hypoxia and leg swelling, and treatment for heart failure, and discharged with an increased Lasix dose to 40mg BID.  On admission, she found to have no fever, but tachypnea, hypoxia to 84 % in Room air and positive COVID PCR. She has started on Remdesivir and Dexamethasone, and the ID consult requested to assist with further evaluation and antibiotic management.    # COVID Pneumonitis  # Acute Hypoxic Respiratory failure- on oxygen via NC  # The urine culture grew Klebsiella and Aerococcus in the setting of negative Urine analysis, most likely a contaminant.     would recommend:    1. Supportive care including AC  2. Continue Remdesivir and Dexamethasone to complete the course  3. Monitor ALT and kidney function while on Remdesivir  4. Supplemental oxygenation and Bronchodilator as needed  5. Aspiration and COVID precautions  6.  OOB to chair     d/w patient and Nursing staff     Attending Attestation:    Spent more than 35 minutes on total encounter, more than 50 % of the visit was spent counseling and/or coordinating care by the Attending physician.

## 2022-10-13 NOTE — PROGRESS NOTE ADULT - PROBLEM SELECTOR PLAN 7
- Presented with SOB now requiring supplemental 02, likely due to CHF exacerbation in the setting of medication noncompliance    - History of COPD,  not on home 02, takes trelegy Ellipta and albuterol prn   - Not currently in exacerbation, no wheezing on exam  - CXR noted to have b/l infiltrates with no evidence of consolidation, consistent with pulmonary congestion   - C/w Symbicort, Spiriva and Albuterol prn  - Weaning supplemental O2 as tolerated.  Maintain O2 sat 88-92%, avoid over oxygenation - Presented with SOB now requiring supplemental 02, likely due to CHF exacerbation in the setting of medication noncompliance    - History of COPD,  not on home 02, takes trelegy Ellipta and albuterol prn   - Not currently in exacerbation, no wheezing on exam  - CXR noted to have b/l infiltrates with no evidence of consolidation, consistent with pulmonary congestion   - C/w Singulair and Albuterol prn  - Weaning supplemental O2 as tolerated.  Maintain O2 sat 88-92%, avoid over oxygenation - No EKG changes noted  - Continue with telemetry monitoring  - C/w Coreg & Eliquis  - Cardiology-Dr. Denton consulted, appreciated

## 2022-10-13 NOTE — PROGRESS NOTE ADULT - PROBLEM SELECTOR PLAN 10
- Patient has a h/o breast CA with chemotherapy / radiation  - C/w Anastrazole - Patient has a history of LLE DVT  - Diagnosed two years ago with A-fib diagnosis  - C/w Eliquis   - S/p US of LLE negative for DVT

## 2022-10-13 NOTE — PHYSICAL THERAPY INITIAL EVALUATION ADULT - GENERAL OBSERVATIONS, REHAB EVAL
Pt. received supine in bed, NAD, connected to [employer]. Pt. cooperative during eval. Pt. A&O x 3. Pt. received supine in bed, NAD, connected to Addy. Pt. is legally blind and is on telemetry. Pt. cooperative during eval. Pt. A&O x 3.

## 2022-10-13 NOTE — PROGRESS NOTE ADULT - SUBJECTIVE AND OBJECTIVE BOX
NP Note discussed with  primary attending    Patient is a 89y old  Female who presents with a chief complaint of SOB (13 Oct 2022 13:30)      INTERVAL HPI/OVERNIGHT EVENTS: Denies HA, dizziness, SOB, CP or any other new pain.  Reports chronic b/l hip and leg pain.  Found with oxygen resting on chest.  Unable to state whether she has oxygen at home.      MEDICATIONS  (STANDING):  anastrozole 1 milliGRAM(s) Oral daily  apixaban 5 milliGRAM(s) Oral every 12 hours  artificial  tears Solution 1 Drop(s) Both EYES four times a day  aspirin  chewable 81 milliGRAM(s) Oral daily  carvedilol 6.25 milliGRAM(s) Oral every 12 hours  dexAMETHasone  Injectable 6 milliGRAM(s) IV Push daily  dextrose 5%. 1000 milliLiter(s) (50 mL/Hr) IV Continuous <Continuous>  dextrose 5%. 1000 milliLiter(s) (100 mL/Hr) IV Continuous <Continuous>  dextrose 50% Injectable 25 Gram(s) IV Push once  dextrose 50% Injectable 12.5 Gram(s) IV Push once  dextrose 50% Injectable 25 Gram(s) IV Push once  ferrous    sulfate 325 milliGRAM(s) Oral daily  furosemide   Injectable 40 milliGRAM(s) IV Push every 12 hours  glucagon  Injectable 1 milliGRAM(s) IntraMuscular once  insulin lispro (ADMELOG) corrective regimen sliding scale   SubCutaneous three times a day before meals  montelukast 10 milliGRAM(s) Oral daily  remdesivir  IVPB   IV Intermittent   remdesivir  IVPB 100 milliGRAM(s) IV Intermittent every 24 hours  simvastatin 20 milliGRAM(s) Oral at bedtime    MEDICATIONS  (PRN):  acetaminophen     Tablet .. 650 milliGRAM(s) Oral every 6 hours PRN Temp greater or equal to 38C (100.4F), Moderate Pain (4 - 6)  ALBUTerol    90 MICROgram(s) HFA Inhaler 1 Puff(s) Inhalation every 6 hours PRN Shortness of Breath and/or Wheezing  dextrose Oral Gel 15 Gram(s) Oral once PRN Blood Glucose LESS THAN 70 milliGRAM(s)/deciliter  melatonin 3 milliGRAM(s) Oral at bedtime PRN Insomnia      __________________________________________________  REVIEW OF SYSTEMS:    CONSTITUTIONAL: No fever  EYES: No acute visual disturbances  NECK: No pain or stiffness  RESPIRATORY: No cough; No shortness of breath  CARDIOVASCULAR: No chest pain, no palpitations  GASTROINTESTINAL: No pain. No nausea or vomiting.  No diarrhea   NEUROLOGICAL: No headache or numbness, no tremors  MUSCULOSKELETAL: (+) B/l Chronic hip and leg pain   GENITOURINARY: No dysuria, no frequency, no hesitancy  PSYCHIATRY: No depression , no anxiety  ALL OTHER  ROS negative        Vital Signs Last 24 Hrs  T(C): 37.1 (13 Oct 2022 13:26), Max: 37.1 (13 Oct 2022 13:26)  T(F): 98.8 (13 Oct 2022 13:26), Max: 98.8 (13 Oct 2022 13:26)  HR: 83 (13 Oct 2022 13:26) (70 - 95)  BP: 152/86 (13 Oct 2022 13:26) (110/53 - 152/86)  BP(mean): 95 (13 Oct 2022 10:53) (95 - 95)  RR: 18 (13 Oct 2022 13:26) (16 - 18)  SpO2: 100% (13 Oct 2022 13:26) (99% - 100%)    Parameters below as of 13 Oct 2022 13:26  Patient On (Oxygen Delivery Method): nasal cannula  O2 Flow (L/min): 3      ________________________________________________  PHYSICAL EXAM:  GENERAL: NAD, Obese   HEENT: Normocephalic;  conjunctivae and sclerae clear; moist mucous membranes;   NECK : Supple  CHEST/LUNG: Clear to auscultation bilaterally with good air entry   HEART: S1 S2  regular; no murmurs, gallops or rubs  ABDOMEN: Soft, Nontender, Nondistended; Bowel sounds present  EXTREMITIES: no cyanosis; LLE 1+ edema, RLE trace edema; no calf tenderness  SKIN: warm and dry; no rash  NERVOUS SYSTEM:  Awake and alert; Oriented to person, not place or date; no new deficits  _________________________________________________  LABS:                        13.1   8.94  )-----------( 210      ( 13 Oct 2022 05:41 )             42.2     10-13    139  |  99  |  17  ----------------------------<  78  5.7<H>   |  25  |  1.02    Ca    5.3<LL>      13 Oct 2022 05:41    TPro  6.8  /  Alb  1.5<L>  /  TBili  0.4  /  DBili  x   /  AST  37  /  ALT  19  /  AlkPhos  116  10-13    PT/INR - ( 13 Oct 2022 05:41 )   PT: 18.9 sec;   INR: 1.58 ratio             CAPILLARY BLOOD GLUCOSE      POCT Blood Glucose.: 233 mg/dL (13 Oct 2022 12:15)  POCT Blood Glucose.: 236 mg/dL (13 Oct 2022 08:27)        RADIOLOGY & ADDITIONAL TESTS:    Imaging Personally Reviewed:  YES    Consultant(s) Notes Reviewed:   YES    Care Discussed with Consultants :     Plan of care was discussed with patient and /or primary care giver; all questions and concerns were addressed and care was aligned with patient's wishes.

## 2022-10-13 NOTE — PROGRESS NOTE ADULT - PROBLEM SELECTOR PLAN 5
- Noted to be COVID (+) in the ED, denies any active respiratory symptoms or sick contacts  - Desaturating to 87% on RA, baseline 90-95% with a h/o COPD not on home o2  - Started on Remdesivir x 5 days and Decadron x10 days  -  f/u COVID Ab - d/c remdesivir if positive  - Obtain ambulatory O2 and document  - CXR noted to have b/l infiltrates with no evidence of consolidation,   - Obtain inflammatory markers(d-dimer, procalcitonin, LDH, ferritin, ESR, CRP)  - Maintain O2 88-92% for COPD, o2 support titrate as needed.  - ID-Dr. Denton consulted, appreciated - Likely in the setting of CHF exacerbation in the setting of medication noncompliance   - At baseline patient ambulates with rolling walker  - Reports chronic LE swelling  - S/p LLE doppler negative DVT  - C/o chronic right hip pain, denies fall or trauma   - PT recommended [facility] but family will take pt home.  Has a CDPAP.

## 2022-10-13 NOTE — PROGRESS NOTE ADULT - PROBLEM SELECTOR PLAN 3
- Elevated Potassium=6.3 on admission  - No EKG changes noted  - Likely in the setting of CHF exacerbation d/t medication noncompliance  - S/p Hyperkalemia cocktail  - Continue to monitor Potassium  - CMP in AM-f/u results - Elevated ProBNP 1763   - Trop 168, and no EKG changes   - Known history of HFpEF on Coreg 6.25mg BID and Lasix 40mg BID at home, but has not taken Lasix x5d as she ran out   - S/p Lasix 40mg IVP x1.  Currently on Lasix 40mg IV BID   - C/w Coreg & Lasix   - C/w Strict I&O's.  Daily wt.    - Cardiology-Dr. Denton consulted, appreciated

## 2022-10-13 NOTE — PROGRESS NOTE ADULT - PROBLEM SELECTOR PROBLEM 8
Suspected deep vein thrombosis (DVT) Azithromycin Counseling:  I discussed with the patient the risks of azithromycin including but not limited to GI upset, allergic reaction, drug rash, diarrhea, and yeast infections.

## 2022-10-13 NOTE — PROGRESS NOTE ADULT - PROBLEM SELECTOR PLAN 1
- Patient was sent from PCP office for Hypoxia in the setting of SOB on exertion and b/l pitting edema,  - Known history of HFpEF  - Likely secondary to CHF exacerbation in the setting of medication noncompliance   - ProBNP 1763   - Trop 168  - SpO2  84 % on RA in the ED, dyspneic on exertion.  S/p Lasix 40mg IVP x1 and Decadron 6mg IVPx1, and started on Remdesivir in the ED  - CXR noted to have b/l infiltrates with no evidence of consolidation-findings consistent with pulmonary congestion   - S/p LLE Doppler negative for DVT   - C/w Lasix 40mg IV BID   - C/w Albuterol & Singulair

## 2022-10-13 NOTE — PROGRESS NOTE ADULT - PROBLEM SELECTOR PLAN 9
- Patient has a history of LLE DVT  - Diagnosed two years ago with A-fib diagnosis  - C/w Eliquis   - S/p US of LLE negative for DVT - History of Hypertension  - C/w Coreg & Lasix   - Continue to monitor BP

## 2022-10-13 NOTE — PROGRESS NOTE ADULT - PROBLEM SELECTOR PLAN 2
isolation precautions  ID follow-up noted  monitor LDH, LFTs, D-Dimer, Ferritin, CRP and procalcitonin  follow up Covid PCR  follow up CXR  IV Dexa/Remdesevir

## 2022-10-13 NOTE — PROGRESS NOTE ADULT - SUBJECTIVE AND OBJECTIVE BOX
Patient is seen and examined at the bed side, is afebrile. She is doing much better,  remains on supplemental oxygenation and sitting up at the edge of bed . The urine culture grew Klebsiella and Aerococcus in the setting of negative Urine analysis, most likely a contaminant.       REVIEW OF SYSTEMS: All other review systems are negative      ALLERGIES: Chicken (Stomach Upset)  losartan (Angioedema)      Vital Signs Last 24 Hrs  T(C): 37.1 (13 Oct 2022 13:26), Max: 37.1 (13 Oct 2022 13:26)  T(F): 98.8 (13 Oct 2022 13:26), Max: 98.8 (13 Oct 2022 13:26)  HR: 83 (13 Oct 2022 13:) (70 - 95)  BP: 152/86 (13 Oct 2022 13:26) (110/53 - 152/86)  BP(mean): 95 (13 Oct 2022 10:53) (95 - 95)  RR: 18 (13 Oct 2022 13:) (16 - 18)  SpO2: 100% (13 Oct 2022 13:26) (99% - 100%)    Parameters below as of 13 Oct 2022 13:26  Patient On (Oxygen Delivery Method): nasal cannula  O2 Flow (L/min): 3      PHYSICAL EXAM:  GENERAL: Not in distress, on oxygen via NC  CHEST/LUNG: Not using accessory muscles   CNS: Awake and Alert  - Please refer to Primary team's note for rest of the systems      LABS:                        13.1   8.94  )-----------( 210      ( 13 Oct 2022 05:41 )             42.2                           13.1   6.22  )-----------( 208      ( 12 Oct 2022 06:21 )             43.3       10-13    138  |  95<L>  |  x   ----------------------------<  x   4.9   |  x   |  x     Ca    5.3<LL>      13 Oct 2022 05:41  Mg     2.0     10-13    TPro  6.8  /  Alb  1.5<L>  /  TBili  0.4  /  DBili  x   /  AST  37  /  ALT  19  /  AlkPhos  116  10-13      10-12    141  |  99  |  22<H>  ----------------------------<  155<H>  5.0   |  37<H>  |  0.93    Ca    8.6      12 Oct 2022 06:21  Phos  4.2     10-11  Mg     2.1     10-11    TPro  6.9  /  Alb  3.0<L>  /  TBili  0.3  /  DBili  0.1  /  AST  17  /  ALT  18  /  AlkPhos  128<H>  1012    PT/INR - ( 11 Oct 2022 05:01 )   PT: 17.5 sec;   INR: 1.46 ratio      PTT - ( 10 Oct 2022 19:00 )  PTT:41.1 sec        CAPILLARY BLOOD GLUCOSE  POCT Blood Glucose.: 164 mg/dL (11 Oct 2022 12:06)  POCT Blood Glucose.: 163 mg/dL (11 Oct 2022 08:27)  POCT Blood Glucose.: 142 mg/dL (11 Oct 2022 02:15)        Urinalysis Basic - ( 11 Oct 2022 06:05 )  Color: Yellow / Appearance: Clear / S.005 / pH: x  Gluc: x / Ketone: Negative  / Bili: Negative / Urobili: Negative   Blood: x / Protein: Negative / Nitrite: Negative   Leuk Esterase: Negative / RBC: 0-2 /HPF / WBC 3-5 /HPF   Sq Epi: x / Non Sq Epi: Few /HPF / Bacteria: Trace /HPF        MEDICATIONS  (STANDING):    anastrozole 1 milliGRAM(s) Oral daily  apixaban 5 milliGRAM(s) Oral every 12 hours  artificial  tears Solution 1 Drop(s) Both EYES four times a day  aspirin  chewable 81 milliGRAM(s) Oral daily  carvedilol 6.25 milliGRAM(s) Oral every 12 hours  dexAMETHasone  Injectable 6 milliGRAM(s) IV Push daily  ferrous    sulfate 325 milliGRAM(s) Oral daily  furosemide   Injectable 40 milliGRAM(s) IV Push every 12 hours  glucagon  Injectable 1 milliGRAM(s) IntraMuscular once  insulin lispro (ADMELOG) corrective regimen sliding scale   SubCutaneous three times a day before meals  montelukast 10 milliGRAM(s) Oral daily  remdesivir  IVPB   IV Intermittent   remdesivir  IVPB 100 milliGRAM(s) IV Intermittent every 24 hours  simvastatin 20 milliGRAM(s) Oral at bedtime        RADIOLOGY & ADDITIONAL TESTS:    10/10/22 : Xray Chest 1 View AP/PA (10.10.22 @ 18:43) IMPRESSION:   No radiographic evidence of active chest disease.      10/10/22 : US Duplex Venous Lower Ext Ltd, Left (10.10.22 @ 18:42) Limited study with limited compression secondary to swelling.  No obvious left lower extremity deep venous thrombosis.      MICROBIOLOGY DATA:    Culture - Urine (10.11.22 @ 06:05)   Specimen Source: Clean Catch Clean Catch (Midstream)   Culture Results:   50,000 - 99,000 CFU/mL Klebsiella pneumoniae   10,000 - 49,000 CFU/mL Aerococcus species   "Aerococcus spp. are predictably susceptible to penicillin,   ampicillin, tetracycline, and vancomycin. All isolates are   resistant to sulfonamides"     Urine Microscopic-Add On (NC) (10.11.22 @ 06:05)   Epithelial Cells: Few /HPF   Red Blood Cell - Urine: 0-2 /HPF   White Blood Cell - Urine: 3-5 /HPF   Bacteria: Trace /HPF     Flu With COVID-19 By PATRICK (10.10.22 @ 17:50)   SARS-CoV-2 Result: Detected:

## 2022-10-13 NOTE — PROGRESS NOTE ADULT - PROBLEM SELECTOR PLAN 2
- Elevated ProBNP 1763   - Trop 168, and no EKG changes   - Known history of HFpEF on Coreg 6.25mg BID and Lasix 40mg BID at home, but has not taken Lasix x5d as she ran out   - S/p Lasix 40mg IVP x1.  Currently on Lasix 40mg IV BID   - C/w Coreg & Lasix   - C/w Strict I&O's.  Daily wt.    - Cardiology-Dr. Denton consulted, appreciated - Patient was sent from PCP office for Hypoxia in the setting of SOB on exertion and b/l pitting edema,  - Known history of HFpEF  - Likely secondary to CHF exacerbation in the setting of medication noncompliance   - ProBNP 1763   - Trop 168  - SpO2  84 % on RA in the ED, dyspneic on exertion.  S/p Lasix 40mg IVP x1 and Decadron 6mg IVPx1, and started on Remdesivir in the ED  - CXR noted to have b/l infiltrates with no evidence of consolidation-findings consistent with pulmonary congestion   - S/p LLE Doppler negative for DVT   - C/w Lasix 40mg IV BID   - C/w Albuterol & Singulair  - Cardiology-Dr. Denton consulted, appreciated    - Pulmonary-Dr. Son consulted, appreciated

## 2022-10-13 NOTE — PROGRESS NOTE ADULT - PROBLEM SELECTOR PLAN 5
- Presented with SOB now requiring supplemental 02, likely due to CHF exacerbation in the setting of medication noncompliance    - History of COPD,  not on home 02, takes trelegy Ellipta and albuterol prn   - Not currently in exacerbation, no wheezing on exam  - CXR noted to have b/l infiltrates with no evidence of consolidation, consistent with pulmonary congestion   - C/w Singulair and Albuterol prn  - Weaning supplemental O2 as tolerated.  Maintain O2 sat 88-92%, avoid over oxygenation

## 2022-10-13 NOTE — PROGRESS NOTE ADULT - PROBLEM SELECTOR PLAN 8
- History of Hypertension  - C/w Coreg & Lasix   - Continue to monitor BP - Presented with SOB now requiring supplemental 02, likely due to CHF exacerbation in the setting of medication noncompliance    - History of COPD,  not on home 02, takes trelegy Ellipta and albuterol prn   - Not currently in exacerbation, no wheezing on exam  - CXR noted to have b/l infiltrates with no evidence of consolidation, consistent with pulmonary congestion   - C/w Singulair and Albuterol prn  - Weaning supplemental O2 as tolerated.  Maintain O2 sat 88-92%, avoid over oxygenation

## 2022-10-13 NOTE — PROGRESS NOTE ADULT - PROBLEM SELECTOR PLAN 6
- No EKG changes noted  - Continue with telemetry monitoring  - C/w Coreg & Eliquis  - Cardiology-Dr. Denton consulted, appreciated - Noted to be COVID (+) in the ED, denies any active respiratory symptoms or sick contacts  - Desaturating to 87% on RA, baseline 90-95% with a h/o COPD not on home o2  - Started on Remdesivir x 5 days and Decadron x10 days  -  f/u COVID Ab - d/c remdesivir if positive  - Obtain ambulatory O2 and document  - CXR noted to have b/l infiltrates with no evidence of consolidation,   - Obtain inflammatory markers(d-dimer, procalcitonin, LDH, ferritin, ESR, CRP)  - Maintain O2 88-92% for COPD, o2 support titrate as needed.  - ID-Dr. Denton consulted, appreciated - Noted to be COVID (+) in the ED, denies any active respiratory symptoms or sick contacts  - Desaturating to 87% on RA, baseline 90-95% with a h/o COPD not on home o2  - Started on Remdesivir x 5 days and Decadron x10 days  -  C/w Remdesivir and Decadron per ID rec  - Obtain ambulatory O2 and document  - CXR noted to have b/l infiltrates with no evidence of consolidation,   - Obtain inflammatory markers(d-dimer, procalcitonin, LDH, ferritin, ESR, CRP)  - Maintain O2 88-92% for COPD, o2 support titrate as needed.  - ID-Dr. Denton consulted, appreciated

## 2022-10-13 NOTE — PROGRESS NOTE ADULT - PROBLEM SELECTOR PLAN 2
- Elevated ProBNP 1763   - Trop 168, and no EKG changes   - Known history of HFpEF on Coreg 6.25mg BID and Lasix 40mg BID at home, but has not taken Lasix x5d as she ran out   - S/p Lasix 40mg IVP x1.  Currently on Lasix 40mg IV BID   - C/w Coreg & Lasix   - C/w Strict I&O's.  Daily wt.

## 2022-10-13 NOTE — PROGRESS NOTE ADULT - PROBLEM SELECTOR PLAN 11
- C/w Lovenox for DVT ppx - Patient has a h/o breast CA with chemotherapy / radiation  - C/w Anastrazole

## 2022-10-13 NOTE — PROGRESS NOTE ADULT - SUBJECTIVE AND OBJECTIVE BOX
PATIENT SEEN AND EXAMINED ON :- 10/13/22  DATE OF 10/13/22:             Interim events noted,Labs ,Radiological studies and Cardiology tests reviewed .       HOSPITAL COURSE: HPI:  89F coming from home, ambulates with RW, with PMHx of COPD (not oh home o2), HFpEF (7/29/22 EF 55-60%, LVH, GIDD, mild pulmonary HTN), Afib on Eliquis, Breast CA, HTN, and with remote h/o LLE DVT sent from PCP office for Hypoxia, with associated SOB and YUAN x2d in the setting of medication noncompliance. According to granddaughter, Macario [phone] the patient has had SOB with YUAN for the past few days, which she attributes to not taking her Lasix for 5 days because patient ran out. Patient denies any associated chest pain or cough with SOB.  Patient also c/o chronic LE swelling, recently worsening. Also stating that she has right hip and right shoulder pain which she attributes to her arthritis. Per granddaughter, patient was admitted to [facility] in August for hypoxia and leg swelling, and treatment for heart failure, and discharged with an increased Lasix dose to 40mg BID. Per granddaughter, she ran out of Lasix after not going to her f/u PCP as the patient did not want want to go d/t clinical improvement. In the ED, patient found to be COVID positive, however denies any recent sick contact, as well as no fevers, chills, congestion and cough. Covid Vacc x3.  (10 Oct 2022 23:20)      INTERIM EVENTS:Patient seen at bedside ,interim events noted.      PMH -reviewed admission note, no change since admission  HEART FAILURE: Acute[ ]Chronic[ ] Systolic[ ] Diastolic[ ] Combined Systolic and Diastolic[ ]  CAD[ ] CABG[ ] PCI[ ]  DEVICES[ ] PPM[ ] ICD[ ] ILR[ ]  ATRIAL FIBRILLATION[ ] Paroxysmal[ ] Permanent[ ] CHADS2-[  ]  JACOBO[ ] CKD1[ ] CKD2[ ] CKD3[ ] CKD4[ ] ESRD[ ]  COPD[ ] HTN[ ]   DM[ ] Type1[ ] Type 2[ ]   CVA[ ] Paresis[ ]    AMBULATION: Assisted[ ] Cane/walker[ ] Independent[ ]    MEDICATIONS  (STANDING):  anastrozole 1 milliGRAM(s) Oral daily  apixaban 5 milliGRAM(s) Oral every 12 hours  artificial  tears Solution 1 Drop(s) Both EYES four times a day  aspirin  chewable 81 milliGRAM(s) Oral daily  carvedilol 6.25 milliGRAM(s) Oral every 12 hours  dexAMETHasone  Injectable 6 milliGRAM(s) IV Push daily  dextrose 5%. 1000 milliLiter(s) (100 mL/Hr) IV Continuous <Continuous>  dextrose 5%. 1000 milliLiter(s) (50 mL/Hr) IV Continuous <Continuous>  dextrose 50% Injectable 25 Gram(s) IV Push once  dextrose 50% Injectable 12.5 Gram(s) IV Push once  dextrose 50% Injectable 25 Gram(s) IV Push once  ferrous    sulfate 325 milliGRAM(s) Oral daily  furosemide   Injectable 40 milliGRAM(s) IV Push every 12 hours  glucagon  Injectable 1 milliGRAM(s) IntraMuscular once  insulin lispro (ADMELOG) corrective regimen sliding scale   SubCutaneous three times a day before meals  montelukast 10 milliGRAM(s) Oral daily  remdesivir  IVPB   IV Intermittent   remdesivir  IVPB 100 milliGRAM(s) IV Intermittent every 24 hours  simvastatin 20 milliGRAM(s) Oral at bedtime    MEDICATIONS  (PRN):  acetaminophen     Tablet .. 650 milliGRAM(s) Oral every 6 hours PRN Temp greater or equal to 38C (100.4F), Moderate Pain (4 - 6)  ALBUTerol    90 MICROgram(s) HFA Inhaler 1 Puff(s) Inhalation every 6 hours PRN Shortness of Breath and/or Wheezing  dextrose Oral Gel 15 Gram(s) Oral once PRN Blood Glucose LESS THAN 70 milliGRAM(s)/deciliter  melatonin 3 milliGRAM(s) Oral at bedtime PRN Insomnia            REVIEW OF SYSTEMS:  Constitutional: [ ] fever, [ ]weight loss,  [ ]fatigue [ ]weight gain  Eyes: [ ] visual changes  Respiratory: [ ]shortness of breath;  [ ] cough, [ ]wheezing, [ ]chills, [ ]hemoptysis  Cardiovascular: [ ] chest pain, [ ]palpitations, [ ]dizziness,  [ ]leg swelling[ ]orthopnea[ ]PND  Gastrointestinal: [ ] abdominal pain, [ ]nausea, [ ]vomiting,  [ ]diarrhea [ ]Constipation [ ]Melena  Genitourinary: [ ] dysuria, [ ] hematuria [ ][place]  Neurologic: [ ] headaches [ ] tremors[ ]weakness [ ]Paralysis Right[ ] Left[ ]  Skin: [ ] itching, [ ]burning, [ ] rashes  Endocrine: [ ] heat or cold intolerance  Musculoskeletal: [ ] joint pain or swelling; [ ] muscle, back, or extremity pain  Psychiatric: [ ] depression, [ ]anxiety, [ ]mood swings, or [ ]difficulty sleeping  Hematologic: [ ] easy bruising, [ ] bleeding gums    [ ] All remaining systems negative except as per above.   [ ]Unable to obtain.  [x] No change in ROS since admission      Vital Signs Last 24 Hrs  T(C): 36.7 (13 Oct 2022 17:44), Max: 37.1 (13 Oct 2022 13:26)  T(F): 98.1 (13 Oct 2022 17:44), Max: 98.8 (13 Oct 2022 13:26)  HR: 70 (13 Oct 2022 17:44) (70 - 95)  BP: 140/79 (13 Oct 2022 17:44) (110/53 - 152/86)  BP(mean): 95 (13 Oct 2022 10:53) (95 - 95)  RR: 19 (13 Oct 2022 17:44) (16 - 19)  SpO2: 97% (13 Oct 2022 17:44) (97% - 100%)    Parameters below as of 13 Oct 2022 17:44  Patient On (Oxygen Delivery Method): nasal cannula  O2 Flow (L/min): 3    I&O's Summary    12 Oct 2022 07:01  -  13 Oct 2022 07:00  --------------------------------------------------------  IN: 0 mL / OUT: 1900 mL / NET: -1900 mL        PHYSICAL EXAM:  General: No acute distress BMI-  HEENT: EOMI, PERRL  Neck: Supple, [ ] JVD  Lungs: Equal air entry bilaterally; [ ] rales [ ] wheezing [ ] rhonchi  Heart: Regular rate and rhythm; [x ] murmur   2/6 [ x] systolic [ ] diastolic [ ] radiation[ ] rubs [ ]  gallops  Abdomen: Nontender, bowel sounds present  Extremities: No clubbing, cyanosis, [ ] edema [ ]Pulses  equal and intact  Nervous system:  Alert & Oriented X3, no focal deficits  Psychiatric: Normal affect  Skin: No rashes or lesions    LABS:  10-13    138  |  95<L>  |  33<H>  ----------------------------<  183<H>  4.9   |  41<H>  |  0.95    Ca    8.5      13 Oct 2022 16:50  Phos  2.5     10-13  Mg     2.0     10-13    TPro  7.3  /  Alb  3.2<L>  /  TBili  0.4  /  DBili  x   /  AST  22  /  ALT  22  /  AlkPhos  127<H>  10-13    Creatinine Trend: 0.95<--, 1.02<--, 0.93<--, 0.91<--, 0.96<--, 0.89<--                        13.1   8.94  )-----------( 210      ( 13 Oct 2022 05:41 )             42.2     PT/INR - ( 13 Oct 2022 05:41 )   PT: 18.9 sec;   INR: 1.58 ratio

## 2022-10-14 LAB
GLUCOSE BLDC GLUCOMTR-MCNC: 179 MG/DL — HIGH (ref 70–99)
GLUCOSE BLDC GLUCOMTR-MCNC: 213 MG/DL — HIGH (ref 70–99)
GLUCOSE BLDC GLUCOMTR-MCNC: 223 MG/DL — HIGH (ref 70–99)
GLUCOSE BLDC GLUCOMTR-MCNC: 266 MG/DL — HIGH (ref 70–99)
GLUCOSE BLDC GLUCOMTR-MCNC: 364 MG/DL — HIGH (ref 70–99)

## 2022-10-14 PROCEDURE — 71045 X-RAY EXAM CHEST 1 VIEW: CPT | Mod: 26

## 2022-10-14 RX ORDER — FUROSEMIDE 40 MG
40 TABLET ORAL
Refills: 0 | Status: DISCONTINUED | OUTPATIENT
Start: 2022-10-15 | End: 2022-10-20

## 2022-10-14 RX ORDER — FUROSEMIDE 40 MG
40 TABLET ORAL ONCE
Refills: 0 | Status: COMPLETED | OUTPATIENT
Start: 2022-10-14 | End: 2022-10-14

## 2022-10-14 RX ORDER — ERGOCALCIFEROL 1.25 MG/1
50000 CAPSULE ORAL ONCE
Refills: 0 | Status: COMPLETED | OUTPATIENT
Start: 2022-10-14 | End: 2022-10-14

## 2022-10-14 RX ADMIN — Medication 6 MILLIGRAM(S): at 05:32

## 2022-10-14 RX ADMIN — REMDESIVIR 500 MILLIGRAM(S): 5 INJECTION INTRAVENOUS at 00:00

## 2022-10-14 RX ADMIN — APIXABAN 5 MILLIGRAM(S): 2.5 TABLET, FILM COATED ORAL at 05:32

## 2022-10-14 RX ADMIN — SIMVASTATIN 20 MILLIGRAM(S): 20 TABLET, FILM COATED ORAL at 22:01

## 2022-10-14 RX ADMIN — Medication 1: at 08:29

## 2022-10-14 RX ADMIN — Medication 40 MILLIGRAM(S): at 18:14

## 2022-10-14 RX ADMIN — APIXABAN 5 MILLIGRAM(S): 2.5 TABLET, FILM COATED ORAL at 18:13

## 2022-10-14 RX ADMIN — ANASTROZOLE 1 MILLIGRAM(S): 1 TABLET ORAL at 12:59

## 2022-10-14 RX ADMIN — CARVEDILOL PHOSPHATE 6.25 MILLIGRAM(S): 80 CAPSULE, EXTENDED RELEASE ORAL at 05:46

## 2022-10-14 RX ADMIN — Medication 1 DROP(S): at 12:58

## 2022-10-14 RX ADMIN — Medication 2: at 13:03

## 2022-10-14 RX ADMIN — CARVEDILOL PHOSPHATE 6.25 MILLIGRAM(S): 80 CAPSULE, EXTENDED RELEASE ORAL at 18:13

## 2022-10-14 RX ADMIN — Medication 1 DROP(S): at 05:32

## 2022-10-14 RX ADMIN — Medication 40 MILLIGRAM(S): at 05:32

## 2022-10-14 RX ADMIN — Medication 1 DROP(S): at 00:00

## 2022-10-14 RX ADMIN — MONTELUKAST 10 MILLIGRAM(S): 4 TABLET, CHEWABLE ORAL at 12:59

## 2022-10-14 RX ADMIN — Medication 2: at 17:22

## 2022-10-14 RX ADMIN — Medication 1 DROP(S): at 18:15

## 2022-10-14 RX ADMIN — Medication 81 MILLIGRAM(S): at 13:03

## 2022-10-14 RX ADMIN — ERGOCALCIFEROL 50000 UNIT(S): 1.25 CAPSULE ORAL at 18:13

## 2022-10-14 NOTE — PROGRESS NOTE ADULT - PROBLEM SELECTOR PLAN 6
- Noted to be COVID (+) in the ED, denies any active respiratory symptoms or sick contacts  - Desaturating to 87% on RA, baseline 90-95% with a h/o COPD not on home o2  - Started on Remdesivir x 5 days and Decadron x10 days  -  C/w Remdesivir, D4.  Last day 10/15.    - S/p Decadron, transitioned to Prednisone   - Obtain ambulatory O2 and document  - CXR noted to have b/l infiltrates with no evidence of consolidation,   - Obtain inflammatory markers(d-dimer, procalcitonin, LDH, ferritin, ESR, CRP)  - Maintain O2 88-92% for COPD, o2 support titrate as needed.  - ID-Dr. Denton consulted, appreciated - Noted to be COVID (+) in the ED, denies any active respiratory symptoms or sick contacts  - Desaturating to 87% on RA, baseline 90-95% with a h/o COPD not on home o2  - Started on Remdesivir x 5 days and Decadron x10 days  -  C/w Remdesivir, D4.  Last day 10/15.    - S/p Decadron, 10/14.  Transitioned to Prednisone and tapered   - Unable to obtain ambulatory O2 deconditioned, PT recommended AMY.  However, resting and sitting oxygen saturation obtained an documented.  Pt does not warrant home oxygen at this time.      - CXR noted to have b/l infiltrates with no evidence of consolidation  - 10/14 Repeat CXR-F/u results    - Continue to monitory inflammatory markers(d-dimer, procalcitonin, LDH, ferritin, ESR, CRP) Q3days  - Maintain O2 88-92% for COPD, o2 support titrate as needed.  - ID-Dr. Denton consulted, appreciated - Noted to be COVID (+) in the ED, denies any active respiratory symptoms or sick contacts  - Desaturating to 87% on RA, baseline 90-95% with a h/o COPD not on home o2  - Started on Remdesivir x 5 days and Decadron x10 days  -  C/w Remdesivir, D4.  Last day 10/15.    - S/p Decadron, 10/14.  Transitioned to Prednisone and tapered   - Unable to obtain ambulatory O2 deconditioned, PT recommended AMY.  However, resting and sitting oxygen saturation obtained an documented.  Pt does not warrant home oxygen at this time.    10/14 S/p supplemental oxygen.  Currently in RA sating 93-95% - Maintain O2 88-92% for COPD.  - CXR noted to have b/l infiltrates with no evidence of consolidation  - 10/14 Repeat CXR-F/u results    - Continue to monitory inflammatory markers(d-dimer, procalcitonin, LDH, ferritin, ESR, CRP) Q3days  - ID-Dr. Denton consulted, appreciated

## 2022-10-14 NOTE — PROGRESS NOTE ADULT - PROBLEM SELECTOR PLAN 7
- No EKG changes noted  - Continue with telemetry monitoring  - C/w Coreg & Eliquis  - Cardiology-Dr. Denton consulted, appreciated

## 2022-10-14 NOTE — PROGRESS NOTE ADULT - ASSESSMENT
89 year old Female, from home, ambulates with RW, with PMH of COPD (not on home oxygen), HFpEF (7/29/22 EF 55-60%, LVH, GIDD, mild pulmonary HTN), Afib on Eliquis, Breast CA, HTN, and remote h/o LLE DVT sent from PCP office for Hypoxia, with associated SOB and YUAN x2d.  Admitted for AHRF likely 2/2 CHF exacerbation in the setting of medication noncompliance & COVID infection.      89 year old Female, from home, ambulates with RW, with PMH of COPD (not on home oxygen), HFpEF (7/29/22 EF 55-60%, LVH, GIDD, mild pulmonary HTN), Afib on Eliquis, Breast CA, HTN, and remote h/o LLE DVT sent from PCP office for Hypoxia, with associated SOB and YUAN x2d.  Admitted for AHRF likely 2/2 CHF exacerbation in the setting of medication noncompliance & COVID infection.       10/14 Pt refused AM bloodwork

## 2022-10-14 NOTE — PROGRESS NOTE ADULT - PROBLEM SELECTOR PLAN 2
- Patient was sent from PCP office for Hypoxia in the setting of SOB on exertion and b/l pitting edema,  - Known history of HFpEF  - Likely secondary to CHF exacerbation in the setting of medication noncompliance   - ProBNP 1763   - Trop 168  - SpO2  84 % on RA in the ED, dyspneic on exertion.  S/p Lasix 40mg IVP x1 and Decadron 6mg IVPx1, and started on Remdesivir in the ED  - CXR noted to have b/l infiltrates with no evidence of consolidation-findings consistent with pulmonary congestion   - S/p LLE Doppler negative for DVT   - Lasix IV de-escalated to PO home dose.  C/w Lasix 40mg PO BID.     - C/w Albuterol & Singulair  - Cardiology-Dr. Denton consulted, appreciated    - Pulmonary-Dr. Son consulted, appreciated - Patient was sent from PCP office for Hypoxia in the setting of SOB on exertion and b/l pitting edema,  - Known history of HFpEF  - Likely secondary to CHF exacerbation in the setting of medication noncompliance   - ProBNP 1763   - Trop 168  - SpO2  84 % on RA in the ED, dyspneic on exertion.  S/p Lasix 40mg IVP x1 and Decadron 6mg IVPx1, and started on Remdesivir in the ED  - CXR noted to have b/l infiltrates with no evidence of consolidation-findings consistent with pulmonary congestion  - 10/14 Repeat CXR-F/u results     - S/p LLE Doppler negative for DVT   - Lasix IV de-escalated to PO home dose.  C/w Lasix 40mg PO BID.     - C/w Albuterol & Singulair  - Cardiology-Dr. Denton consulted, appreciated    - Pulmonary-Dr. Son consulted, appreciated - Patient was sent from PCP office for Hypoxia in the setting of SOB on exertion and b/l pitting edema,  - Known history of HFpEF  - Likely secondary to CHF exacerbation in the setting of medication noncompliance   - ProBNP 1763   - Trop 168  - SpO2  84 % on RA in the ED, dyspneic on exertion.  S/p Lasix 40mg IVP x1 and Decadron 6mg IVPx1, and started on Remdesivir in the ED.  10/14 S/p supplemental oxygen.  Currently in RA sating 93-95%   - CXR noted to have b/l infiltrates with no evidence of consolidation-findings consistent with pulmonary congestion  - 10/14 Repeat CXR-F/u results     - S/p LLE Doppler negative for DVT   - Lasix IV de-escalated to PO home dose.  C/w Lasix 40mg PO BID.     - C/w Albuterol & Singulair  - Cardiology-Dr. Denton consulted, appreciated    - Pulmonary-Dr. Sno consulted, appreciated

## 2022-10-14 NOTE — PROGRESS NOTE ADULT - PROBLEM SELECTOR PLAN 8
- Presented with SOB now requiring supplemental 02, likely due to CHF exacerbation in the setting of medication noncompliance    - History of COPD,  not on home 02, takes trelegy Ellipta and albuterol prn   - Not currently in exacerbation, no wheezing on exam  - CXR noted to have b/l infiltrates with no evidence of consolidation, consistent with pulmonary congestion   - C/w Singulair and Albuterol prn  - Weaning supplemental O2 as tolerated.  Maintain O2 sat 88-92%, avoid over oxygenation - Presented with SOB now requiring supplemental 02, likely due to CHF exacerbation in the setting of medication noncompliance    - History of COPD,  not on home 02, takes trelegy Ellipta and albuterol prn   - Not currently in exacerbation, no wheezing on exam  - CXR noted to have b/l infiltrates with no evidence of consolidation, consistent with pulmonary congestion   - 10/14 Repeat CXR-F/u results    - C/w Singulair and Albuterol prn  - Weaning supplemental O2 as tolerated.  Maintain O2 sat 88-92%, avoid over oxygenation  - Unable to obtain ambulatory O2 deconditioned, PT recommended AMY.  However, resting and sitting oxygen saturation obtained an documented.  Pt does not warrant home oxygen at this time. - Presented with SOB now requiring supplemental 02, likely due to CHF exacerbation in the setting of medication noncompliance    - History of COPD, not on home 02, takes trelegy Ellipta and albuterol prn   - Not currently in exacerbation, no wheezing on exam  - CXR noted to have b/l infiltrates with no evidence of consolidation, consistent with pulmonary congestion   - 10/14 Repeat CXR-F/u results    - C/w Singulair and Albuterol prn  - 10/14 S/p supplemental oxygen.  Currently in RA sating 93-95% - Maintain O2 88-92% for COPD.  - Unable to obtain ambulatory O2 deconditioned, PT recommended AMY.  However, resting and sitting oxygen saturation obtained an documented.  Pt does not warrant home oxygen at this time.

## 2022-10-14 NOTE — PROGRESS NOTE ADULT - PROBLEM SELECTOR PLAN 8
- Presented with SOB now requiring supplemental 02, likely due to CHF exacerbation in the setting of medication noncompliance    - History of COPD, not on home 02, takes trelegy Ellipta and albuterol prn   - Not currently in exacerbation, no wheezing on exam  - CXR noted to have b/l infiltrates with no evidence of consolidation, consistent with pulmonary congestion   - 10/14 Repeat CXR-F/u results    - C/w Singulair and Albuterol prn  - 10/14 S/p supplemental oxygen.  Currently in RA sating 93-95% - Maintain O2 88-92% for COPD.  - Unable to obtain ambulatory O2 deconditioned, PT recommended AMY.  However, resting and sitting oxygen saturation obtained an documented.  Pt does not warrant home oxygen at this time.

## 2022-10-14 NOTE — PROGRESS NOTE ADULT - PROBLEM SELECTOR PLAN 2
- Patient was sent from PCP office for Hypoxia in the setting of SOB on exertion and b/l pitting edema,  - Known history of HFpEF  - Likely secondary to CHF exacerbation in the setting of medication noncompliance   - ProBNP 1763   - Trop 168  - SpO2  84 % on RA in the ED, dyspneic on exertion.  S/p Lasix 40mg IVP x1 and Decadron 6mg IVPx1, and started on Remdesivir in the ED.  10/14 S/p supplemental oxygen.  Currently in RA sating 93-95%   - CXR noted to have b/l infiltrates with no evidence of consolidation-findings consistent with pulmonary congestion  - 10/14 Repeat CXR-F/u results     - S/p LLE Doppler negative for DVT   - Lasix IV de-escalated to PO home dose.  C/w Lasix 40mg PO BID.     - C/w Albuterol & Singulair

## 2022-10-14 NOTE — PROGRESS NOTE ADULT - PROBLEM SELECTOR PLAN 1
- Corrected Ca=7.3.  Supplemented  - Continue to monitor Ca  - Vit D in AM-F/u results - Corrected Ca=7.3.  Supplemented  - Continue to monitor Ca  - Vit D-Pt refused AM bloodwork

## 2022-10-14 NOTE — PROGRESS NOTE ADULT - PROBLEM SELECTOR PLAN 3
- Elevated ProBNP 1763   - Trop 168, and no EKG changes   - Known history of HFpEF on Coreg 6.25mg BID and Lasix 40mg BID at home, but has not taken Lasix x5d as she ran out   - S/p Lasix 40mg IVP x1.  Lasix IV de-escalated to PO home dose.  C/w Lasix 40mg PO BID.     - C/w Coreg & Lasix   - C/w Strict I&O's.  Daily wt.

## 2022-10-14 NOTE — PROGRESS NOTE ADULT - SUBJECTIVE AND OBJECTIVE BOX
PATIENT SEEN AND EXAMINED ON :- 10/14/22  DATE OF SERVICE:  10/14/22          Interim events noted,Labs ,Radiological studies and Cardiology tests reviewed .         HOSPITAL COURSE: HPI:  89F coming from home, ambulates with RW, with PMHx of COPD (not oh home o2), HFpEF (7/29/22 EF 55-60%, LVH, GIDD, mild pulmonary HTN), Afib on Eliquis, Breast CA, HTN, and with remote h/o LLE DVT sent from PCP office for Hypoxia, with associated SOB and YUAN x2d in the setting of medication noncompliance. According to granddaughter, Macario [phone] the patient has had SOB with YUAN for the past few days, which she attributes to not taking her Lasix for 5 days because patient ran out. Patient denies any associated chest pain or cough with SOB.  Patient also c/o chronic LE swelling, recently worsening. Also stating that she has right hip and right shoulder pain which she attributes to her arthritis. Per granddaughter, patient was admitted to [facility] in August for hypoxia and leg swelling, and treatment for heart failure, and discharged with an increased Lasix dose to 40mg BID. Per granddaughter, she ran out of Lasix after not going to her f/u PCP as the patient did not want want to go d/t clinical improvement. In the ED, patient found to be COVID positive, however denies any recent sick contact, as well as no fevers, chills, congestion and cough. Covid Vacc x3.  (10 Oct 2022 23:20)      INTERIM EVENTS:Patient seen at bedside ,interim events noted.      PMH -reviewed admission note, no change since admission  HEART FAILURE: Acute[ ]Chronic[ ] Systolic[ ] Diastolic[ ] Combined Systolic and Diastolic[ ]  CAD[ ] CABG[ ] PCI[ ]  DEVICES[ ] PPM[ ] ICD[ ] ILR[ ]  ATRIAL FIBRILLATION[ ] Paroxysmal[ ] Permanent[ ] CHADS2-[  ]  JACOBO[ ] CKD1[ ] CKD2[ ] CKD3[ ] CKD4[ ] ESRD[ ]  COPD[ ] HTN[ ]   DM[ ] Type1[ ] Type 2[ ]   CVA[ ] Paresis[ ]    AMBULATION: Assisted[ ] Cane/walker[ ] Independent[ ]    MEDICATIONS  (STANDING):  anastrozole 1 milliGRAM(s) Oral daily  apixaban 5 milliGRAM(s) Oral every 12 hours  artificial  tears Solution 1 Drop(s) Both EYES four times a day  aspirin  chewable 81 milliGRAM(s) Oral daily  carvedilol 6.25 milliGRAM(s) Oral every 12 hours  dextrose 5%. 1000 milliLiter(s) (50 mL/Hr) IV Continuous <Continuous>  dextrose 5%. 1000 milliLiter(s) (100 mL/Hr) IV Continuous <Continuous>  dextrose 50% Injectable 25 Gram(s) IV Push once  dextrose 50% Injectable 12.5 Gram(s) IV Push once  dextrose 50% Injectable 25 Gram(s) IV Push once  ferrous    sulfate 325 milliGRAM(s) Oral daily  glucagon  Injectable 1 milliGRAM(s) IntraMuscular once  insulin lispro (ADMELOG) corrective regimen sliding scale   SubCutaneous three times a day before meals  montelukast 10 milliGRAM(s) Oral daily  remdesivir  IVPB   IV Intermittent   remdesivir  IVPB 100 milliGRAM(s) IV Intermittent every 24 hours  simvastatin 20 milliGRAM(s) Oral at bedtime    MEDICATIONS  (PRN):  acetaminophen     Tablet .. 650 milliGRAM(s) Oral every 6 hours PRN Temp greater or equal to 38C (100.4F), Moderate Pain (4 - 6)  ALBUTerol    90 MICROgram(s) HFA Inhaler 1 Puff(s) Inhalation every 6 hours PRN Shortness of Breath and/or Wheezing  dextrose Oral Gel 15 Gram(s) Oral once PRN Blood Glucose LESS THAN 70 milliGRAM(s)/deciliter  melatonin 3 milliGRAM(s) Oral at bedtime PRN Insomnia            REVIEW OF SYSTEMS:  Constitutional: [ ] fever, [ ]weight loss,  [ ]fatigue [ ]weight gain  Eyes: [ ] visual changes  Respiratory: [ ]shortness of breath;  [ ] cough, [ ]wheezing, [ ]chills, [ ]hemoptysis  Cardiovascular: [ ] chest pain, [ ]palpitations, [ ]dizziness,  [ ]leg swelling[ ]orthopnea[ ]PND  Gastrointestinal: [ ] abdominal pain, [ ]nausea, [ ]vomiting,  [ ]diarrhea [ ]Constipation [ ]Melena  Genitourinary: [ ] dysuria, [ ] hematuria [ ]Hawley  Neurologic: [ ] headaches [ ] tremors[ ]weakness [ ]Paralysis Right[ ] Left[ ]  Skin: [ ] itching, [ ]burning, [ ] rashes  Endocrine: [ ] heat or cold intolerance  Musculoskeletal: [ ] joint pain or swelling; [ ] muscle, back, or extremity pain  Psychiatric: [ ] depression, [ ]anxiety, [ ]mood swings, or [ ]difficulty sleeping  Hematologic: [ ] easy bruising, [ ] bleeding gums    [ ] All remaining systems negative except as per above.   [ ]Unable to obtain.  [x] No change in ROS since admission      Vital Signs Last 24 Hrs  T(C): 36.4 (14 Oct 2022 18:12), Max: 37 (14 Oct 2022 13:18)  T(F): 97.5 (14 Oct 2022 18:12), Max: 98.6 (14 Oct 2022 13:18)  HR: 84 (14 Oct 2022 18:12) (56 - 84)  BP: 150/92 (14 Oct 2022 18:12) (133/75 - 157/95)  BP(mean): --  RR: 16 (14 Oct 2022 18:12) (16 - 19)  SpO2: 100% (14 Oct 2022 18:12) (93% - 100%)    Parameters below as of 14 Oct 2022 18:12  Patient On (Oxygen Delivery Method): room air      I&O's Summary      PHYSICAL EXAM:  General: No acute distress BMI-  HEENT: EOMI, PERRL  Neck: Supple, [ ] JVD  Lungs: Equal air entry bilaterally; [ ] rales [ ] wheezing [ ] rhonchi  Heart: Regular rate and rhythm; [x ] murmur   2/6 [ x] systolic [ ] diastolic [ ] radiation[ ] rubs [ ]  gallops  Abdomen: Nontender, bowel sounds present  Extremities: No clubbing, cyanosis, [ ] edema [ ]Pulses  equal and intact  Nervous system:  Alert & Oriented X3, no focal deficits  Psychiatric: Normal affect  Skin: No rashes or lesions    LABS:  10-13    138  |  95<L>  |  33<H>  ----------------------------<  183<H>  4.9   |  41<H>  |  0.95    Ca    8.5      13 Oct 2022 16:50  Phos  2.5     10-13  Mg     2.0     10-13    TPro  7.3  /  Alb  3.2<L>  /  TBili  0.4  /  DBili  x   /  AST  22  /  ALT  22  /  AlkPhos  127<H>  10-13    Creatinine Trend: 0.95<--, 1.02<--, 0.93<--, 0.91<--, 0.96<--, 0.89<--                        13.1   8.94  )-----------( 210      ( 13 Oct 2022 05:41 )             42.2     PT/INR - ( 13 Oct 2022 05:41 )   PT: 18.9 sec;   INR: 1.58 ratio

## 2022-10-14 NOTE — PROGRESS NOTE ADULT - PROBLEM SELECTOR PLAN 4
- Resolved   - Elevated Potassium=6.3 on admission  - No EKG changes noted  - Likely in the setting of CHF exacerbation d/t medication noncompliance  - S/p Hyperkalemia cocktail  - S/p Lokelma   - Continue to monitor Potassium  - CMP-Pt refused AM bloodwork

## 2022-10-14 NOTE — PROGRESS NOTE ADULT - PROBLEM SELECTOR PLAN 10
- Patient has a history of LLE DVT  - Diagnosed two years ago with A-fib diagnosis  - C/w Eliquis   - S/p US of LLE negative for DVT

## 2022-10-14 NOTE — PROGRESS NOTE ADULT - SUBJECTIVE AND OBJECTIVE BOX
NP Note discussed with  primary attending    Patient is a 89y old  Female who presents with a chief complaint of SOB (14 Oct 2022 12:13)      INTERVAL HPI/OVERNIGHT EVENTS: Pt reports, "I'm fine thank you."  Denies HA, dizziness, SOB, CP or any other pain today.  Found on 1LNC.      MEDICATIONS  (STANDING):  anastrozole 1 milliGRAM(s) Oral daily  apixaban 5 milliGRAM(s) Oral every 12 hours  artificial  tears Solution 1 Drop(s) Both EYES four times a day  aspirin  chewable 81 milliGRAM(s) Oral daily  carvedilol 6.25 milliGRAM(s) Oral every 12 hours  dextrose 5%. 1000 milliLiter(s) (50 mL/Hr) IV Continuous <Continuous>  dextrose 5%. 1000 milliLiter(s) (100 mL/Hr) IV Continuous <Continuous>  dextrose 50% Injectable 25 Gram(s) IV Push once  dextrose 50% Injectable 12.5 Gram(s) IV Push once  dextrose 50% Injectable 25 Gram(s) IV Push once  ferrous    sulfate 325 milliGRAM(s) Oral daily  furosemide    Tablet 40 milliGRAM(s) Oral once  glucagon  Injectable 1 milliGRAM(s) IntraMuscular once  insulin lispro (ADMELOG) corrective regimen sliding scale   SubCutaneous three times a day before meals  montelukast 10 milliGRAM(s) Oral daily  remdesivir  IVPB   IV Intermittent   remdesivir  IVPB 100 milliGRAM(s) IV Intermittent every 24 hours  simvastatin 20 milliGRAM(s) Oral at bedtime    MEDICATIONS  (PRN):  acetaminophen     Tablet .. 650 milliGRAM(s) Oral every 6 hours PRN Temp greater or equal to 38C (100.4F), Moderate Pain (4 - 6)  ALBUTerol    90 MICROgram(s) HFA Inhaler 1 Puff(s) Inhalation every 6 hours PRN Shortness of Breath and/or Wheezing  dextrose Oral Gel 15 Gram(s) Oral once PRN Blood Glucose LESS THAN 70 milliGRAM(s)/deciliter  melatonin 3 milliGRAM(s) Oral at bedtime PRN Insomnia      __________________________________________________  REVIEW OF SYSTEMS:    CONSTITUTIONAL: No fever  EYES: No acute visual disturbances  NECK: No pain or stiffness  RESPIRATORY: No cough; No shortness of breath  CARDIOVASCULAR: No chest pain, no palpitations  GASTROINTESTINAL: No pain. No nausea or vomiting.  No diarrhea   NEUROLOGICAL: No headache or numbness, no tremors  MUSCULOSKELETAL: (+) B/l Chronic hip and leg pain   GENITOURINARY: No dysuria, no frequency, no hesitancy  PSYCHIATRY: No depression , no anxiety  ALL OTHER  ROS negative      Vital Signs Last 24 Hrs  T(C): 37 (14 Oct 2022 13:18), Max: 37 (14 Oct 2022 13:18)  T(F): 98.6 (14 Oct 2022 13:18), Max: 98.6 (14 Oct 2022 13:18)  HR: 78 (14 Oct 2022 13:18) (56 - 81)  BP: 147/96 (14 Oct 2022 13:18) (133/75 - 157/95)  RR: 18 (14 Oct 2022 13:18) (18 - 19)  SpO2: 95% (14 Oct 2022 13:18) (93% - 100%)    Parameters below as of 14 Oct 2022 13:18  Patient On (Oxygen Delivery Method): room air        ________________________________________________  PHYSICAL EXAM:  GENERAL: NAD, Obese   HEENT: Normocephalic;  conjunctivae and sclerae clear; moist mucous membranes;   NECK : Supple  CHEST/LUNG: Clear to auscultation bilaterally with good air entry   HEART: S1 S2  regular; no murmurs, gallops or rubs  ABDOMEN: Soft, Nontender, Nondistended; Bowel sounds present  EXTREMITIES: no cyanosis; LLE 1+ edema, RLE no edema; no calf tenderness  SKIN: warm and dry; no rash  NERVOUS SYSTEM:  Awake and alert; Oriented to person, not place or date; no new deficits  _________________________________________________  LABS:                        13.1   8.94  )-----------( 210      ( 13 Oct 2022 05:41 )             42.2     10-13    138  |  95<L>  |  33<H>  ----------------------------<  183<H>  4.9   |  41<H>  |  0.95    Ca    8.5      13 Oct 2022 16:50  Phos  2.5     10-13  Mg     2.0     10-13    TPro  7.3  /  Alb  3.2<L>  /  TBili  0.4  /  DBili  x   /  AST  22  /  ALT  22  /  AlkPhos  127<H>  10-13    PT/INR - ( 13 Oct 2022 05:41 )   PT: 18.9 sec;   INR: 1.58 ratio             CAPILLARY BLOOD GLUCOSE      POCT Blood Glucose.: 223 mg/dL (14 Oct 2022 12:38)  POCT Blood Glucose.: 364 mg/dL (14 Oct 2022 11:23)  POCT Blood Glucose.: 179 mg/dL (14 Oct 2022 08:13)  POCT Blood Glucose.: 185 mg/dL (13 Oct 2022 21:56)  POCT Blood Glucose.: 196 mg/dL (13 Oct 2022 17:07)        RADIOLOGY & ADDITIONAL TESTS:    Imaging Personally Reviewed:  YES    Consultant(s) Notes Reviewed:   YES    Care Discussed with Consultants :     Plan of care was discussed with patient and /or primary care giver; all questions and concerns were addressed and care was aligned with patient's wishes.

## 2022-10-14 NOTE — PROGRESS NOTE ADULT - SUBJECTIVE AND OBJECTIVE BOX
INTERVAL HPI/OVERNIGHT EVENTS:  Patient seen,events noticed for overnight,stable clinically  VITAL SIGNS:  T(F): 98.4 (10-14-22 @ 05:43)  HR: 56 (10-14-22 @ 10:17)  BP: 133/97 (10-14-22 @ 10:17)  RR: 18 (10-14-22 @ 10:17)  SpO2: 93% (10-14-22 @ 10:17)  Wt(kg): --    PHYSICAL EXAM:  awake  Constitutional:  Eyes:  ENMT:perrla  Neck:  Respiratory:few rales  Cardiovascular:s1s2,m-none  Gastrointestinal:soft,bs pos  Extremities:mild varicosity  Vascular:  Neurological:no focal deficit  Musculoskeletal:    MEDICATIONS  (STANDING):  anastrozole 1 milliGRAM(s) Oral daily  apixaban 5 milliGRAM(s) Oral every 12 hours  artificial  tears Solution 1 Drop(s) Both EYES four times a day  aspirin  chewable 81 milliGRAM(s) Oral daily  carvedilol 6.25 milliGRAM(s) Oral every 12 hours  dextrose 5%. 1000 milliLiter(s) (50 mL/Hr) IV Continuous <Continuous>  dextrose 5%. 1000 milliLiter(s) (100 mL/Hr) IV Continuous <Continuous>  dextrose 50% Injectable 25 Gram(s) IV Push once  dextrose 50% Injectable 12.5 Gram(s) IV Push once  dextrose 50% Injectable 25 Gram(s) IV Push once  ferrous    sulfate 325 milliGRAM(s) Oral daily  furosemide    Tablet 40 milliGRAM(s) Oral once  glucagon  Injectable 1 milliGRAM(s) IntraMuscular once  insulin lispro (ADMELOG) corrective regimen sliding scale   SubCutaneous three times a day before meals  montelukast 10 milliGRAM(s) Oral daily  remdesivir  IVPB   IV Intermittent   remdesivir  IVPB 100 milliGRAM(s) IV Intermittent every 24 hours  simvastatin 20 milliGRAM(s) Oral at bedtime    MEDICATIONS  (PRN):  acetaminophen     Tablet .. 650 milliGRAM(s) Oral every 6 hours PRN Temp greater or equal to 38C (100.4F), Moderate Pain (4 - 6)  ALBUTerol    90 MICROgram(s) HFA Inhaler 1 Puff(s) Inhalation every 6 hours PRN Shortness of Breath and/or Wheezing  dextrose Oral Gel 15 Gram(s) Oral once PRN Blood Glucose LESS THAN 70 milliGRAM(s)/deciliter  melatonin 3 milliGRAM(s) Oral at bedtime PRN Insomnia      Allergies    losartan (Angioedema)    Intolerances    Chicken (Stomach Upset)      LABS:                        13.1   8.94  )-----------( 210      ( 13 Oct 2022 05:41 )             42.2     10-13    138  |  95<L>  |  33<H>  ----------------------------<  183<H>  4.9   |  41<H>  |  0.95    Ca    8.5      13 Oct 2022 16:50  Phos  2.5     10-13  Mg     2.0     10-13    TPro  7.3  /  Alb  3.2<L>  /  TBili  0.4  /  DBili  x   /  AST  22  /  ALT  22  /  AlkPhos  127<H>  10-13    PT/INR - ( 13 Oct 2022 05:41 )   PT: 18.9 sec;   INR: 1.58 ratio               RADIOLOGY & ADDITIONAL TESTS:      Assessment and Plan:   · Assessment	  89 year old Female, from home, ambulates with RW, with PMH of COPD (not on home oxygen), HFpEF (7/29/22 EF 55-60%, LVH, GIDD, mild pulmonary HTN), Afib on Eliquis, Breast CA, HTN, and remote h/o LLE DVT sent from PCP office for Hypoxia, with associated SOB and YUAN x2d.  Admitted for AHRF likely 2/2 CHF exacerbation in the setting of medication noncompliance & COVID infection.          Nutritional Assessment:  · Nutritional Assessment	Diet, Regular:   DASH/TLC {Sodium & Cholesterol Restricted}  Egg Restricted  No Egg  No Poultry  No Shellfish (10-11-22 @ 04:15) [Active]     Problem/Plan - 1:  ·  Problem: Hypocalcemia-stable   ·  Plan: - Corrected Ca=7.3.  Supplemented  - Continue to monitor Ca  - Vit D in AM-F/u results.     Problem/Plan - 2:  ·  Problem: Acute respiratory failure with hypoxia.   - Known history of HFpEF  - Likely secondary to CHF exacerbation in the setting of medication noncompliance   - SpO2  84 % on RA in the ED, dyspneic on exertion.  S/p Lasix 40mg IVP x1 and Decadron 6mg IVPx1, and started on Remdesivir in the ED  - S/p LLE Doppler negative for DVT   - C/w Lasix 40mg IV BID   - C/w Albuterol & Singulair  - Cardiology-Dr. Denton consulted, appreciated    - Pulmonary-Dr. Son consulted, appreciated.     Problem/Plan - 3:  ·  Problem: Acute on chronic diastolic congestive heart failure.   - Known history of HFpEF on Coreg 6.25mg BID and Lasix 40mg BID at home, but has not taken Lasix x5d as she ran out   - S/p Lasix 40mg IVP x1.  Currently on Lasix 40mg IV BID   - C/w Coreg & Lasix     - Cardiology-Dr. Denton consulted, appreciated.     Problem/Plan - 4:  ·  Problem: Hyperkalemiresolved   ·  Plan: - Elevated Potassium=6.3 on admission  - No EKG changes noted  - Likely in the setting of CHF exacerbation d/t medication noncompliance  - S/p Lokelma today  - Continue to monitor Potassium  - CMP in AM-f/u results.     Problem/Plan - 5:  ·  Problem: Ambulatory dysfunction.   ·  Plan: - Likely in the setting of CHF exacerbation in the setting of medication noncompliance   - At baseline patient ambulates with rolling walker  - Reports chronic LE swelling  - S/p LLE doppler negative DVT  - C/o chronic right hip pain, denies fall or trauma   - PT recommended AMY but family will take pt home.  Has a CDPAP.     Problem/Plan - 6:  ·  Problem: 2019 novel coronavirus disease (COVID-19).   ·  Plan: - Noted to be COVID (+) in the ED, denies any active respiratory symptoms or sick contacts  - Desaturating to 87% on RA, baseline 90-95% with a h/o COPD not on home o2  - Started on Remdesivir x 5 days and Decadron x10 days  -  C/w Remdesivir and Decadron per ID rec  - Obtain ambulatory O2 and document  - CXR noted to have b/l infiltrates with no evidence of consolidation,   - Obtain inflammatory markers(d-dimer, procalcitonin, LDH, ferritin, ESR, CRP)  - Maintain O2 88-92% for COPD, o2 support titrate as needed.  - ID-Dr. Denton consulted, appreciated.     Problem/Plan - 7:  ·  Problem: Afib.   ·  Plan: - No EKG changes noted  - Continue with telemetry monitoring  - C/w Coreg & Eliquis  - Cardiology-Dr. Denton consulted, appreciated.     Problem/Plan - 8:  ·  Problem: COPD (chronic obstructive pulmonary disease).   ·  Plan: - Presented with SOB now requiring supplemental 02, likely due to CHF exacerbation in the setting of medication noncompliance    - History of COPD,  not on home 02, takes trelegy Ellipta and albuterol prn   - Not currently in exacerbation, no wheezing on exam  - CXR noted to have b/l infiltrates with no evidence of consolidation, consistent with pulmonary congestion   - C/w Singulair and Albuterol prn  - Weaning supplemental O2 as tolerated.  Maintain O2 sat 88-92%, avoid over oxygenation.     Problem/Plan - 9:  ·  Problem: HTN (hypertension).   ·  Plan: - History of Hypertension  - C/w Coreg & Lasix   - Continue to monitor BP.     Problem/Plan - 10:  ·  Problem: Deep vein thrombosis (DVT).   ·  Plan; - Patient has a history of LLE DVT  - Diagnosed two years ago with A-fib diagnosis  - C/w Eliquis   - S/p US of LLE negative for DVT.     Problem/Plan - 11:  ·  Problem: Breast cancer.   ·  Plan: - Patient has a h/o breast CA with chemotherapy / radiation  - C/w Anastrazole.     Problem/Plan - 12:  ·  Problem: Prophylactic measure.   ·  Plan: - C/w Lovenox for DVT ppx.  covering for dr sure

## 2022-10-14 NOTE — PROGRESS NOTE ADULT - SUBJECTIVE AND OBJECTIVE BOX
Patient is a 89y old  Female who presents with a chief complaint of SOB (13 Oct 2022 15:37)  Awake, alert, layiing in bed in NAD. Remains on isolation due to Covid infection    INTERVAL HPI/OVERNIGHT EVENTS:      VITAL SIGNS:  T(F): 98.4 (10-14-22 @ 05:43)  HR: 56 (10-14-22 @ 10:17)  BP: 133/97 (10-14-22 @ 10:17)  RR: 18 (10-14-22 @ 10:17)  SpO2: 93% (10-14-22 @ 10:17)  Wt(kg): --  I&O's Detail          REVIEW OF SYSTEMS:    CONSTITUTIONAL:  No fevers, chills, sweats    HEENT:  Eyes:  No diplopia or blurred vision. ENT:  No earache, sore throat or runny nose.    CARDIOVASCULAR:  No pressure, squeezing, tightness, or heaviness about the chest; no palpitations.    RESPIRATORY:  Per HPI    GASTROINTESTINAL:  No abdominal pain, nausea, vomiting or diarrhea.    GENITOURINARY:  No dysuria, frequency or urgency.    NEUROLOGIC:  No paresthesias, fasciculations, seizures or weakness.    PSYCHIATRIC:  No disorder of thought or mood.      PHYSICAL EXAM:    Constitutional: Well developed and nourished  Eyes:Perrla  ENMT: normal  Neck:supple  Respiratory: good air entry  Cardiovascular: S1 S2 regular  Gastrointestinal: Soft, Non tender  Extremities: No edema  Vascular:normal  Neurological:Awake, alert,Ox3  Musculoskeletal:Normal      MEDICATIONS  (STANDING):  anastrozole 1 milliGRAM(s) Oral daily  apixaban 5 milliGRAM(s) Oral every 12 hours  artificial  tears Solution 1 Drop(s) Both EYES four times a day  aspirin  chewable 81 milliGRAM(s) Oral daily  carvedilol 6.25 milliGRAM(s) Oral every 12 hours  dextrose 5%. 1000 milliLiter(s) (50 mL/Hr) IV Continuous <Continuous>  dextrose 5%. 1000 milliLiter(s) (100 mL/Hr) IV Continuous <Continuous>  dextrose 50% Injectable 25 Gram(s) IV Push once  dextrose 50% Injectable 12.5 Gram(s) IV Push once  dextrose 50% Injectable 25 Gram(s) IV Push once  ferrous    sulfate 325 milliGRAM(s) Oral daily  furosemide    Tablet 40 milliGRAM(s) Oral once  glucagon  Injectable 1 milliGRAM(s) IntraMuscular once  insulin lispro (ADMELOG) corrective regimen sliding scale   SubCutaneous three times a day before meals  montelukast 10 milliGRAM(s) Oral daily  remdesivir  IVPB   IV Intermittent   remdesivir  IVPB 100 milliGRAM(s) IV Intermittent every 24 hours  simvastatin 20 milliGRAM(s) Oral at bedtime    MEDICATIONS  (PRN):  acetaminophen     Tablet .. 650 milliGRAM(s) Oral every 6 hours PRN Temp greater or equal to 38C (100.4F), Moderate Pain (4 - 6)  ALBUTerol    90 MICROgram(s) HFA Inhaler 1 Puff(s) Inhalation every 6 hours PRN Shortness of Breath and/or Wheezing  dextrose Oral Gel 15 Gram(s) Oral once PRN Blood Glucose LESS THAN 70 milliGRAM(s)/deciliter  melatonin 3 milliGRAM(s) Oral at bedtime PRN Insomnia      Allergies    losartan (Angioedema)    Intolerances    Chicken (Stomach Upset)      LABS:                        13.1   8.94  )-----------( 210      ( 13 Oct 2022 05:41 )             42.2     10-13    138  |  95<L>  |  33<H>  ----------------------------<  183<H>  4.9   |  41<H>  |  0.95    Ca    8.5      13 Oct 2022 16:50  Phos  2.5     10-13  Mg     2.0     10-13    TPro  7.3  /  Alb  3.2<L>  /  TBili  0.4  /  DBili  x   /  AST  22  /  ALT  22  /  AlkPhos  127<H>  10-13    PT/INR - ( 13 Oct 2022 05:41 )   PT: 18.9 sec;   INR: 1.58 ratio                   CAPILLARY BLOOD GLUCOSE      POCT Blood Glucose.: 364 mg/dL (14 Oct 2022 11:23)  POCT Blood Glucose.: 179 mg/dL (14 Oct 2022 08:13)  POCT Blood Glucose.: 185 mg/dL (13 Oct 2022 21:56)  POCT Blood Glucose.: 196 mg/dL (13 Oct 2022 17:07)  POCT Blood Glucose.: 233 mg/dL (13 Oct 2022 12:15)    pro-bnp -- 10-13 @ 05:41     d-dimer <150  10-13 @ 05:41  pro-bnp 1763 10-10 @ 17:50     d-dimer --  10-10 @ 17:50      RADIOLOGY & ADDITIONAL TESTS:    CXR:    Ct scan chest:    ekg;    echo:

## 2022-10-14 NOTE — PROGRESS NOTE ADULT - ASSESSMENT
Patient is a 89y  old female  coming from home, with PMHx of COPD (not oh home o2), HFpEF (7/29/22 EF 55-60%, LVH, GIDD, mild pulmonary HTN), Afib on Eliquis, Breast CA, HTN, and with remote h/o LLE DVT sent from PCP office for evaluation of Hypoxia, with associated SOB and YUAN x2d in the setting of medication noncompliance.  Patient has not taking her Lasix for last 5 days because she ran out  of Lasix. Per granddaughter, patient was admitted to [facility] in August for hypoxia and leg swelling, and treatment for heart failure, and discharged with an increased Lasix dose to 40mg BID.  On admission, she found to have no fever, but tachypnea, hypoxia to 84 % in Room air and positive COVID PCR. She has started on Remdesivir and Dexamethasone, and the ID consult requested to assist with further evaluation and antibiotic management.    # COVID Pneumonitis  # Acute Hypoxic Respiratory failure- on oxygen via NC  # The urine culture grew Klebsiella and Aerococcus in the setting of negative Urine analysis, most likely a contaminant.     would recommend:    1. Monitor ALT and kidney function while on Remdesivir   2. Continue Remdesivir to complete the course  3. Supportive care including AC  4. Supplemental oxygenation and Bronchodilator as needed  5. Aspiration and COVID precautions  6.  OOB to chair     d/w patient and Nursing staff     Attending Attestation:    Spent more than 35 minutes on total encounter, more than 50 % of the visit was spent counseling and/or coordinating care by the Attending physician.

## 2022-10-14 NOTE — PROGRESS NOTE ADULT - PROBLEM SELECTOR PLAN 3
- Elevated ProBNP 1763   - Trop 168, and no EKG changes   - Known history of HFpEF on Coreg 6.25mg BID and Lasix 40mg BID at home, but has not taken Lasix x5d as she ran out   - S/p Lasix 40mg IVP x1.  Lasix IV de-escalated to PO home dose.  C/w Lasix 40mg PO BID.     - C/w Coreg & Lasix   - C/w Strict I&O's.  Daily wt.    - Cardiology-Dr. Denton consulted, appreciated

## 2022-10-14 NOTE — PROGRESS NOTE ADULT - PROBLEM SELECTOR PLAN 4
- Resolved   - Elevated Potassium=6.3 on admission  - No EKG changes noted  - Likely in the setting of CHF exacerbation d/t medication noncompliance  - S/p Hyperkalemia cocktail  - S/p Lokelma   - Continue to monitor Potassium  - CMP in AM-f/u results - Resolved   - Elevated Potassium=6.3 on admission  - No EKG changes noted  - Likely in the setting of CHF exacerbation d/t medication noncompliance  - S/p Hyperkalemia cocktail  - S/p Lokelma   - Continue to monitor Potassium  - CMP-Pt refused AM bloodwork

## 2022-10-14 NOTE — PROGRESS NOTE ADULT - PROBLEM SELECTOR PLAN 6
- Noted to be COVID (+) in the ED, denies any active respiratory symptoms or sick contacts  - Desaturating to 87% on RA, baseline 90-95% with a h/o COPD not on home o2  - Started on Remdesivir x 5 days and Decadron x10 days  -  C/w Remdesivir, D4.  Last day 10/15.    - S/p Decadron, 10/14.  Transitioned to Prednisone and tapered   - Unable to obtain ambulatory O2 deconditioned, PT recommended AMY.  However, resting and sitting oxygen saturation obtained an documented.  Pt does not warrant home oxygen at this time.    10/14 S/p supplemental oxygen.  Currently in RA sating 93-95% - Maintain O2 88-92% for COPD.  - CXR noted to have b/l infiltrates with no evidence of consolidation  - 10/14 Repeat CXR-F/u results    - Continue to monitory inflammatory markers(d-dimer, procalcitonin, LDH, ferritin, ESR, CRP) Q3days

## 2022-10-14 NOTE — PROGRESS NOTE ADULT - PROBLEM SELECTOR PLAN 5
- Likely in the setting of CHF exacerbation in the setting of medication noncompliance   - At baseline patient ambulates with rolling walker  - Reports chronic LE swelling  - S/p LLE doppler negative DVT  - C/o chronic right hip pain, denies fall or trauma   - PT recommended AMY but family will take pt home.  Has a CDPAP.

## 2022-10-14 NOTE — PROGRESS NOTE ADULT - SUBJECTIVE AND OBJECTIVE BOX
Patient is seen and examined at the bed side, is afebrile. She is off supplemental oxygenation and saturating well in Room air.       REVIEW OF SYSTEMS: All other review systems are negative      ALLERGIES: Chicken (Stomach Upset)  losartan (Angioedema)      Vital Signs Last 24 Hrs  T(C): 37 (14 Oct 2022 13:18), Max: 37 (14 Oct 2022 13:18)  T(F): 98.6 (14 Oct 2022 13:18), Max: 98.6 (14 Oct 2022 13:18)  HR: 78 (14 Oct 2022 13:18) (56 - 81)  BP: 147/96 (14 Oct 2022 13:18) (133/75 - 157/95)  BP(mean): --  RR: 18 (14 Oct 2022 13:18) (18 - 19)  SpO2: 95% (14 Oct 2022 13:18) (93% - 100%)    Parameters below as of 14 Oct 2022 13:18  Patient On (Oxygen Delivery Method): room air        PHYSICAL EXAM:  GENERAL: Not in distress, off oxygen   CHEST/LUNG: Not using accessory muscles   CNS: Awake and Alert  - Please refer to Primary team's note for rest of the systems      LABS: No new labs                         13.1   8.94  )-----------( 210      ( 13 Oct 2022 05:41 )             42.2                         13.1   6.22  )-----------( 208      ( 12 Oct 2022 06:21 )             43.3       10-13    138  |  95<L>  |  x   ----------------------------<  x   4.9   |  x   |  x     Ca    5.3<LL>      13 Oct 2022 05:41  Mg     2.0     10-13    TPro  6.8  /  Alb  1.5<L>  /  TBili  0.4  /  DBili  x   /  AST  37  /  ALT  19  /  AlkPhos  116  10-13      1012    141  |  99  |  22<H>  ----------------------------<  155<H>  5.0   |  37<H>  |  0.93    Ca    8.6      12 Oct 2022 06:21  Phos  4.2     10-11  Mg     2.1     10-11    TPro  6.9  /  Alb  3.0<L>  /  TBili  0.3  /  DBili  0.1  /  AST  17  /  ALT  18  /  AlkPhos  128<H>  10-12    PT/INR - ( 11 Oct 2022 05:01 )   PT: 17.5 sec;   INR: 1.46 ratio      PTT - ( 10 Oct 2022 19:00 )  PTT:41.1 sec        CAPILLARY BLOOD GLUCOSE  POCT Blood Glucose.: 164 mg/dL (11 Oct 2022 12:06)  POCT Blood Glucose.: 163 mg/dL (11 Oct 2022 08:27)  POCT Blood Glucose.: 142 mg/dL (11 Oct 2022 02:15)        Urinalysis Basic - ( 11 Oct 2022 06:05 )  Color: Yellow / Appearance: Clear / S.005 / pH: x  Gluc: x / Ketone: Negative  / Bili: Negative / Urobili: Negative   Blood: x / Protein: Negative / Nitrite: Negative   Leuk Esterase: Negative / RBC: 0-2 /HPF / WBC 3-5 /HPF   Sq Epi: x / Non Sq Epi: Few /HPF / Bacteria: Trace /HPF        MEDICATIONS  (STANDING):    anastrozole 1 milliGRAM(s) Oral daily  apixaban 5 milliGRAM(s) Oral every 12 hours  artificial  tears Solution 1 Drop(s) Both EYES four times a day  aspirin  chewable 81 milliGRAM(s) Oral daily  carvedilol 6.25 milliGRAM(s) Oral every 12 hours  ferrous    sulfate 325 milliGRAM(s) Oral daily  furosemide    Tablet 40 milliGRAM(s) Oral once  glucagon  Injectable 1 milliGRAM(s) IntraMuscular once  insulin lispro (ADMELOG) corrective regimen sliding scale   SubCutaneous three times a day before meals  montelukast 10 milliGRAM(s) Oral daily  remdesivir  IVPB   IV Intermittent   remdesivir  IVPB 100 milliGRAM(s) IV Intermittent every 24 hours  simvastatin 20 milliGRAM(s) Oral at bedtime        RADIOLOGY & ADDITIONAL TESTS:    10/10/22 : Xray Chest 1 View AP/PA (10.10.22 @ 18:43) IMPRESSION:   No radiographic evidence of active chest disease.      10/10/22 : US Duplex Venous Lower Ext Ltd, Left (10.10.22 @ 18:42) Limited study with limited compression secondary to swelling.  No obvious left lower extremity deep venous thrombosis.      MICROBIOLOGY DATA:    Culture - Urine (10.11.22 @ 06:05)   Specimen Source: Clean Catch Clean Catch (Midstream)   Culture Results:   50,000 - 99,000 CFU/mL Klebsiella pneumoniae   10,000 - 49,000 CFU/mL Aerococcus species   "Aerococcus spp. are predictably susceptible to penicillin,   ampicillin, tetracycline, and vancomycin. All isolates are   resistant to sulfonamides"     Urine Microscopic-Add On (NC) (10.11.22 @ 06:05)   Epithelial Cells: Few /HPF   Red Blood Cell - Urine: 0-2 /HPF   White Blood Cell - Urine: 3-5 /HPF   Bacteria: Trace /HPF     Flu With COVID-19 By PATRICK (10.10.22 @ 17:50)   SARS-CoV-2 Result: Detected:

## 2022-10-15 LAB
24R-OH-CALCIDIOL SERPL-MCNC: 23.2 NG/ML — LOW (ref 30–80)
ALBUMIN SERPL ELPH-MCNC: 3.3 G/DL — LOW (ref 3.5–5)
ALP SERPL-CCNC: 136 U/L — HIGH (ref 40–120)
ALT FLD-CCNC: 26 U/L DA — SIGNIFICANT CHANGE UP (ref 10–60)
ANION GAP SERPL CALC-SCNC: 7 MMOL/L — SIGNIFICANT CHANGE UP (ref 5–17)
AST SERPL-CCNC: 19 U/L — SIGNIFICANT CHANGE UP (ref 10–40)
BILIRUB SERPL-MCNC: 1 MG/DL — SIGNIFICANT CHANGE UP (ref 0.2–1.2)
BUN SERPL-MCNC: 20 MG/DL — HIGH (ref 7–18)
CALCIUM SERPL-MCNC: 9.1 MG/DL — SIGNIFICANT CHANGE UP (ref 8.4–10.5)
CHLORIDE SERPL-SCNC: 92 MMOL/L — LOW (ref 96–108)
CO2 SERPL-SCNC: 41 MMOL/L — HIGH (ref 22–31)
CREAT SERPL-MCNC: 0.84 MG/DL — SIGNIFICANT CHANGE UP (ref 0.5–1.3)
EGFR: 66 ML/MIN/1.73M2 — SIGNIFICANT CHANGE UP
GLUCOSE BLDC GLUCOMTR-MCNC: 124 MG/DL — HIGH (ref 70–99)
GLUCOSE BLDC GLUCOMTR-MCNC: 149 MG/DL — HIGH (ref 70–99)
GLUCOSE BLDC GLUCOMTR-MCNC: 152 MG/DL — HIGH (ref 70–99)
GLUCOSE BLDC GLUCOMTR-MCNC: 156 MG/DL — HIGH (ref 70–99)
GLUCOSE SERPL-MCNC: 141 MG/DL — HIGH (ref 70–99)
HCT VFR BLD CALC: 47.9 % — HIGH (ref 34.5–45)
HGB BLD-MCNC: 14.9 G/DL — SIGNIFICANT CHANGE UP (ref 11.5–15.5)
INR BLD: 1.66 RATIO — HIGH (ref 0.88–1.16)
MAGNESIUM SERPL-MCNC: 2 MG/DL — SIGNIFICANT CHANGE UP (ref 1.6–2.6)
MCHC RBC-ENTMCNC: 30.3 PG — SIGNIFICANT CHANGE UP (ref 27–34)
MCHC RBC-ENTMCNC: 31.1 GM/DL — LOW (ref 32–36)
MCV RBC AUTO: 97.4 FL — SIGNIFICANT CHANGE UP (ref 80–100)
NRBC # BLD: 0 /100 WBCS — SIGNIFICANT CHANGE UP (ref 0–0)
PHOSPHATE SERPL-MCNC: 1.8 MG/DL — LOW (ref 2.5–4.5)
PLATELET # BLD AUTO: 240 K/UL — SIGNIFICANT CHANGE UP (ref 150–400)
POTASSIUM SERPL-MCNC: 4.1 MMOL/L — SIGNIFICANT CHANGE UP (ref 3.5–5.3)
POTASSIUM SERPL-SCNC: 4.1 MMOL/L — SIGNIFICANT CHANGE UP (ref 3.5–5.3)
PROT SERPL-MCNC: 7.7 G/DL — SIGNIFICANT CHANGE UP (ref 6–8.3)
PROTHROM AB SERPL-ACNC: 19.9 SEC — HIGH (ref 10.5–13.4)
RBC # BLD: 4.92 M/UL — SIGNIFICANT CHANGE UP (ref 3.8–5.2)
RBC # FLD: 13.1 % — SIGNIFICANT CHANGE UP (ref 10.3–14.5)
SODIUM SERPL-SCNC: 140 MMOL/L — SIGNIFICANT CHANGE UP (ref 135–145)
WBC # BLD: 9 K/UL — SIGNIFICANT CHANGE UP (ref 3.8–10.5)
WBC # FLD AUTO: 9 K/UL — SIGNIFICANT CHANGE UP (ref 3.8–10.5)

## 2022-10-15 RX ORDER — CHOLECALCIFEROL (VITAMIN D3) 125 MCG
1000 CAPSULE ORAL DAILY
Refills: 0 | Status: DISCONTINUED | OUTPATIENT
Start: 2022-10-16 | End: 2022-10-20

## 2022-10-15 RX ORDER — LABETALOL HCL 100 MG
10 TABLET ORAL EVERY 4 HOURS
Refills: 0 | Status: DISCONTINUED | OUTPATIENT
Start: 2022-10-15 | End: 2022-10-20

## 2022-10-15 RX ORDER — OLANZAPINE 15 MG/1
2.5 TABLET, FILM COATED ORAL ONCE
Refills: 0 | Status: COMPLETED | OUTPATIENT
Start: 2022-10-15 | End: 2022-10-15

## 2022-10-15 RX ORDER — FUROSEMIDE 40 MG
40 TABLET ORAL ONCE
Refills: 0 | Status: COMPLETED | OUTPATIENT
Start: 2022-10-15 | End: 2022-10-15

## 2022-10-15 RX ORDER — POTASSIUM PHOSPHATE, MONOBASIC POTASSIUM PHOSPHATE, DIBASIC 236; 224 MG/ML; MG/ML
30 INJECTION, SOLUTION INTRAVENOUS ONCE
Refills: 0 | Status: COMPLETED | OUTPATIENT
Start: 2022-10-15 | End: 2022-10-15

## 2022-10-15 RX ORDER — LABETALOL HCL 100 MG
10 TABLET ORAL ONCE
Refills: 0 | Status: COMPLETED | OUTPATIENT
Start: 2022-10-15 | End: 2022-10-15

## 2022-10-15 RX ADMIN — Medication 10 MILLIGRAM(S): at 16:14

## 2022-10-15 RX ADMIN — Medication 40 MILLIGRAM(S): at 17:14

## 2022-10-15 RX ADMIN — Medication 40 MILLIGRAM(S): at 10:45

## 2022-10-15 RX ADMIN — REMDESIVIR 500 MILLIGRAM(S): 5 INJECTION INTRAVENOUS at 00:07

## 2022-10-15 RX ADMIN — Medication 1: at 08:32

## 2022-10-15 RX ADMIN — POTASSIUM PHOSPHATE, MONOBASIC POTASSIUM PHOSPHATE, DIBASIC 83.33 MILLIMOLE(S): 236; 224 INJECTION, SOLUTION INTRAVENOUS at 10:44

## 2022-10-15 RX ADMIN — Medication 1: at 17:14

## 2022-10-15 RX ADMIN — Medication 1 DROP(S): at 00:07

## 2022-10-15 RX ADMIN — Medication 1 DROP(S): at 06:05

## 2022-10-15 RX ADMIN — OLANZAPINE 2.5 MILLIGRAM(S): 15 TABLET, FILM COATED ORAL at 15:13

## 2022-10-15 NOTE — PROGRESS NOTE ADULT - ASSESSMENT
Patient is a 89y  old female  coming from home, with PMHx of COPD (not oh home o2), HFpEF (7/29/22 EF 55-60%, LVH, GIDD, mild pulmonary HTN), Afib on Eliquis, Breast CA, HTN, and with remote h/o LLE DVT sent from PCP office for evaluation of Hypoxia, with associated SOB and YUAN x2d in the setting of medication noncompliance.  Patient has not taking her Lasix for last 5 days because she ran out  of Lasix. Per granddaughter, patient was admitted to [facility] in August for hypoxia and leg swelling, and treatment for heart failure, and discharged with an increased Lasix dose to 40mg BID.  On admission, she found to have no fever, but tachypnea, hypoxia to 84 % in Room air and positive COVID PCR. She has started on Remdesivir and Dexamethasone, and the ID consult requested to assist with further evaluation and antibiotic management.    # COVID Pneumonitis  # Acute Hypoxic Respiratory failure- on oxygen via NC  # The urine culture grew Klebsiella and Aerococcus in the setting of negative Urine analysis, most likely a contaminant.     would recommend:    1. OOB to chair   2. Supportive care including AC  3. Supplemental oxygenation and Bronchodilator as needed  4. Aspiration and COVID precautions    d/w patient and Nursing staff     Attending Attestation:    Spent more than 35 minutes on total encounter, more than 50 % of the visit was spent counseling and/or coordinating care by the Attending physician.

## 2022-10-15 NOTE — PROGRESS NOTE ADULT - SUBJECTIVE AND OBJECTIVE BOX
Patient is seen and examined at the bed side, is afebrile. She remains in Room air and saturating well.      REVIEW OF SYSTEMS: All other review systems are negative      ALLERGIES: Chicken (Stomach Upset)  losartan (Angioedema)      Vital Signs Last 24 Hrs  T(C): 37.1 (15 Oct 2022 10:34), Max: 37.1 (15 Oct 2022 05:18)  T(F): 98.8 (15 Oct 2022 10:34), Max: 98.8 (15 Oct 2022 05:18)  HR: 78 (15 Oct 2022 15:15) (64 - 86)  BP: 150/106 (15 Oct 2022 15:15) (137/100 - 171/93)  BP(mean): --  RR: 20 (15 Oct 2022 15:15) (16 - 20)  SpO2: 92% (15 Oct 2022 15:15) (92% - 100%)    Parameters below as of 15 Oct 2022 15:15  Patient On (Oxygen Delivery Method): room air        PHYSICAL EXAM:  GENERAL: Not in distress, off oxygen   CHEST/LUNG: Not using accessory muscles   CNS: Awake and Alert  - Please refer to Primary team's note for rest of the systems      LABS:                         14.9   9.00  )-----------( 240      ( 15 Oct 2022 05:36 )             47.9                           13.1   8.94  )-----------( 210      ( 13 Oct 2022 05:41 )             42.2       10-15    140  |  92<L>  |  20<H>  ----------------------------<  141<H>  4.1   |  41<H>  |  0.84    Ca    9.1      15 Oct 2022 05:36  Phos  1.8     10-15  Mg     2.0     10-15    TPro  7.7  /  Alb  3.3<L>  /  TBili  1.0  /  DBili  x   /  AST  19  /  ALT  26  /  AlkPhos  136<H>  10-15    10-13    138  |  95<L>  |  x   ----------------------------<  x   4.9   |  x   |  x     Ca    5.3<LL>      13 Oct 2022 05:41  Mg     2.0     10-13    TPro  6.8  /  Alb  1.5<L>  /  TBili  0.4  /  DBili  x   /  AST  37  /  ALT  19  /  AlkPhos  116  10-13    PT/INR - ( 11 Oct 2022 05:01 )   PT: 17.5 sec;   INR: 1.46 ratio      PTT - ( 10 Oct 2022 19:00 )  PTT:41.1 sec        CAPILLARY BLOOD GLUCOSE  POCT Blood Glucose.: 164 mg/dL (11 Oct 2022 12:06)  POCT Blood Glucose.: 163 mg/dL (11 Oct 2022 08:27)  POCT Blood Glucose.: 142 mg/dL (11 Oct 2022 02:15)        Urinalysis Basic - ( 11 Oct 2022 06:05 )  Color: Yellow / Appearance: Clear / S.005 / pH: x  Gluc: x / Ketone: Negative  / Bili: Negative / Urobili: Negative   Blood: x / Protein: Negative / Nitrite: Negative   Leuk Esterase: Negative / RBC: 0-2 /HPF / WBC 3-5 /HPF   Sq Epi: x / Non Sq Epi: Few /HPF / Bacteria: Trace /HPF        MEDICATIONS  (STANDING):    anastrozole 1 milliGRAM(s) Oral daily  apixaban 5 milliGRAM(s) Oral every 12 hours  artificial  tears Solution 1 Drop(s) Both EYES four times a day  aspirin  chewable 81 milliGRAM(s) Oral daily  carvedilol 6.25 milliGRAM(s) Oral every 12 hours  ferrous    sulfate 325 milliGRAM(s) Oral daily  furosemide    Tablet 40 milliGRAM(s) Oral two times a day  glucagon  Injectable 1 milliGRAM(s) IntraMuscular once  insulin lispro (ADMELOG) corrective regimen sliding scale   SubCutaneous three times a day before meals  montelukast 10 milliGRAM(s) Oral daily  simvastatin 20 milliGRAM(s) Oral at bedtime      RADIOLOGY & ADDITIONAL TESTS:    10/10/22 : Xray Chest 1 View AP/PA (10.10.22 @ 18:43) IMPRESSION:   No radiographic evidence of active chest disease.      10/10/22 : US Duplex Venous Lower Ext Ltd, Left (10.10.22 @ 18:42) Limited study with limited compression secondary to swelling.  No obvious left lower extremity deep venous thrombosis.      MICROBIOLOGY DATA:    Culture - Urine (10.11.22 @ 06:05)   Specimen Source: Clean Catch Clean Catch (Midstream)   Culture Results:   50,000 - 99,000 CFU/mL Klebsiella pneumoniae   10,000 - 49,000 CFU/mL Aerococcus species   "Aerococcus spp. are predictably susceptible to penicillin,   ampicillin, tetracycline, and vancomycin. All isolates are   resistant to sulfonamides"     Urine Microscopic-Add On (NC) (10.11.22 @ 06:05)   Epithelial Cells: Few /HPF   Red Blood Cell - Urine: 0-2 /HPF   White Blood Cell - Urine: 3-5 /HPF   Bacteria: Trace /HPF     Flu With COVID-19 By PATRICK (10.10.22 @ 17:50)   SARS-CoV-2 Result: Detected:

## 2022-10-15 NOTE — PROGRESS NOTE ADULT - SUBJECTIVE AND OBJECTIVE BOX
Patient is a 89y old  Female who presents with a chief complaint of SOB (14 Oct 2022 20:56)  Awake, alert, laying in bed in NAD. Doing well on RA  INTERVAL HPI/OVERNIGHT EVENTS:      VITAL SIGNS:  T(F): 98.8 (10-15-22 @ 10:34)  HR: 86 (10-15-22 @ 10:34)  BP: 146/88 (10-15-22 @ 10:34)  RR: 18 (10-15-22 @ 10:34)  SpO2: 97% (10-15-22 @ 10:34)  Wt(kg): --  I&O's Detail    14 Oct 2022 07:01  -  15 Oct 2022 07:00  --------------------------------------------------------  IN:  Total IN: 0 mL    OUT:    Voided (mL): 2200 mL  Total OUT: 2200 mL    Total NET: -2200 mL              REVIEW OF SYSTEMS:    CONSTITUTIONAL:  No fevers, chills, sweats    HEENT:  Eyes:  No diplopia or blurred vision. ENT:  No earache, sore throat or runny nose.    CARDIOVASCULAR:  No pressure, squeezing, tightness, or heaviness about the chest; no palpitations.    RESPIRATORY:  Per HPI    GASTROINTESTINAL:  No abdominal pain, nausea, vomiting or diarrhea.    GENITOURINARY:  No dysuria, frequency or urgency.    NEUROLOGIC:  No paresthesias, fasciculations, seizures or weakness.    PSYCHIATRIC:  No disorder of thought or mood.      PHYSICAL EXAM:    Constitutional: Well developed and nourished  Eyes:Perrla  ENMT: normal  Neck:supple  Respiratory: good air entry  Cardiovascular: S1 S2 regular  Gastrointestinal: Soft, Non tender  Extremities: No edema  Vascular:normal  Neurological:Awake, alert,Ox3  Musculoskeletal:Normal      MEDICATIONS  (STANDING):  anastrozole 1 milliGRAM(s) Oral daily  apixaban 5 milliGRAM(s) Oral every 12 hours  artificial  tears Solution 1 Drop(s) Both EYES four times a day  aspirin  chewable 81 milliGRAM(s) Oral daily  carvedilol 6.25 milliGRAM(s) Oral every 12 hours  dextrose 5%. 1000 milliLiter(s) (50 mL/Hr) IV Continuous <Continuous>  dextrose 5%. 1000 milliLiter(s) (100 mL/Hr) IV Continuous <Continuous>  dextrose 50% Injectable 25 Gram(s) IV Push once  dextrose 50% Injectable 12.5 Gram(s) IV Push once  dextrose 50% Injectable 25 Gram(s) IV Push once  ferrous    sulfate 325 milliGRAM(s) Oral daily  furosemide    Tablet 40 milliGRAM(s) Oral two times a day  glucagon  Injectable 1 milliGRAM(s) IntraMuscular once  insulin lispro (ADMELOG) corrective regimen sliding scale   SubCutaneous three times a day before meals  montelukast 10 milliGRAM(s) Oral daily  simvastatin 20 milliGRAM(s) Oral at bedtime    MEDICATIONS  (PRN):  acetaminophen     Tablet .. 650 milliGRAM(s) Oral every 6 hours PRN Temp greater or equal to 38C (100.4F), Moderate Pain (4 - 6)  ALBUTerol    90 MICROgram(s) HFA Inhaler 1 Puff(s) Inhalation every 6 hours PRN Shortness of Breath and/or Wheezing  dextrose Oral Gel 15 Gram(s) Oral once PRN Blood Glucose LESS THAN 70 milliGRAM(s)/deciliter  melatonin 3 milliGRAM(s) Oral at bedtime PRN Insomnia      Allergies    losartan (Angioedema)    Intolerances    Chicken (Stomach Upset)      LABS:                        14.9   9.00  )-----------( 240      ( 15 Oct 2022 05:36 )             47.9     10-15    140  |  92<L>  |  20<H>  ----------------------------<  141<H>  4.1   |  41<H>  |  0.84    Ca    9.1      15 Oct 2022 05:36  Phos  1.8     10-15  Mg     2.0     10-15    TPro  7.7  /  Alb  3.3<L>  /  TBili  1.0  /  DBili  x   /  AST  19  /  ALT  26  /  AlkPhos  136<H>  10-15    PT/INR - ( 15 Oct 2022 05:36 )   PT: 19.9 sec;   INR: 1.66 ratio                   CAPILLARY BLOOD GLUCOSE      POCT Blood Glucose.: 124 mg/dL (15 Oct 2022 11:19)  POCT Blood Glucose.: 152 mg/dL (15 Oct 2022 07:58)  POCT Blood Glucose.: 266 mg/dL (14 Oct 2022 21:53)  POCT Blood Glucose.: 213 mg/dL (14 Oct 2022 17:10)  POCT Blood Glucose.: 223 mg/dL (14 Oct 2022 12:38)    pro-bnp -- 10-13 @ 05:41     d-dimer <150  10-13 @ 05:41  pro-bnp 1763 10-10 @ 17:50     d-dimer --  10-10 @ 17:50      RADIOLOGY & ADDITIONAL TESTS:    CXR:    Ct scan chest:    ekg;    echo:

## 2022-10-15 NOTE — PROGRESS NOTE ADULT - SUBJECTIVE AND OBJECTIVE BOX
INTERVAL HPI/OVERNIGHT EVENTS:  Patient seen,events noticed for vernight  VITAL SIGNS:  T(F): 98.8 (10-15-22 @ 10:34)  HR: 86 (10-15-22 @ 10:34)  BP: 146/88 (10-15-22 @ 10:34)  RR: 18 (10-15-22 @ 10:34)  SpO2: 97% (10-15-22 @ 10:34)  Wt(kg): --    PHYSICAL EXAM:  awake,nop distress  Constitutional:  Eyes:  ENMT:perrla  Neck:  Respiratory:few rales  Cardiovascular:s1s2,m-none  Gastrointestinal:soft,bs pos  Extremities:  Vascular:  Neurological:no focal deficit  Musculoskeletal:    MEDICATIONS  (STANDING):  anastrozole 1 milliGRAM(s) Oral daily  apixaban 5 milliGRAM(s) Oral every 12 hours  artificial  tears Solution 1 Drop(s) Both EYES four times a day  aspirin  chewable 81 milliGRAM(s) Oral daily  carvedilol 6.25 milliGRAM(s) Oral every 12 hours  dextrose 5%. 1000 milliLiter(s) (50 mL/Hr) IV Continuous <Continuous>  dextrose 5%. 1000 milliLiter(s) (100 mL/Hr) IV Continuous <Continuous>  dextrose 50% Injectable 25 Gram(s) IV Push once  dextrose 50% Injectable 12.5 Gram(s) IV Push once  dextrose 50% Injectable 25 Gram(s) IV Push once  ferrous    sulfate 325 milliGRAM(s) Oral daily  furosemide    Tablet 40 milliGRAM(s) Oral two times a day  glucagon  Injectable 1 milliGRAM(s) IntraMuscular once  insulin lispro (ADMELOG) corrective regimen sliding scale   SubCutaneous three times a day before meals  montelukast 10 milliGRAM(s) Oral daily  simvastatin 20 milliGRAM(s) Oral at bedtime    MEDICATIONS  (PRN):  acetaminophen     Tablet .. 650 milliGRAM(s) Oral every 6 hours PRN Temp greater or equal to 38C (100.4F), Moderate Pain (4 - 6)  ALBUTerol    90 MICROgram(s) HFA Inhaler 1 Puff(s) Inhalation every 6 hours PRN Shortness of Breath and/or Wheezing  dextrose Oral Gel 15 Gram(s) Oral once PRN Blood Glucose LESS THAN 70 milliGRAM(s)/deciliter  melatonin 3 milliGRAM(s) Oral at bedtime PRN Insomnia      Allergies    losartan (Angioedema)    Intolerances    Chicken (Stomach Upset)      LABS:                        14.9   9.00  )-----------( 240      ( 15 Oct 2022 05:36 )             47.9     10-15    140  |  92<L>  |  20<H>  ----------------------------<  141<H>  4.1   |  41<H>  |  0.84    Ca    9.1      15 Oct 2022 05:36  Phos  1.8     10-15  Mg     2.0     10-15    TPro  7.7  /  Alb  3.3<L>  /  TBili  1.0  /  DBili  x   /  AST  19  /  ALT  26  /  AlkPhos  136<H>  10-15    PT/INR - ( 15 Oct 2022 05:36 )   PT: 19.9 sec;   INR: 1.66 ratio               RADIOLOGY & ADDITIONAL TESTS:      Assessment and Plan:    Problem/Plan - 1:  ·  Problem: Acute respiratory failure with hypoxia-stable cliniclly   ·  Plan: improved  cont oxygen supp  monitor resp status.     Problem/Plan - 2:  ·  Problem: 2019 novel coronavirus disease (COVID-19).   ·  Plan: isolation precautions  ID follow-up noted  follow up Covid PCR  montelukast 10 mgs po Qhs.     Problem/Plan - 3:  ·  Problem: Acute on chronic diastolic congestive heart failure.   ·  Plan: lasix IV prn  R/o ACS protocol  cont meds  Cardio follow up.     Problem/Plan - 4:  ·  Problem: COPD (chronic obstructive pulmonary disease).   ·  Plan: Bronchodilators prn  symbicort /4.5 mcgs 2 puffs po BID  Spiriva  PFTs as OP.     Problem/Plan - 5:  ·  Problem: Pulmonary hypertension.   ·  Plan: bronchodilators prn  oxygen supp prn  CCB.     Problem/Plan - 6:  ·  Problem: Afib.   ·  Plan: Tele monitoring  cont meds  Cardio follow up.     Problem/Plan - 7:  ·  Problem: HTN (hypertension).   ·  Plan: monitor BP  cont meds.     Problem/Plan - 8:  ·  Problem: Suspected deep vein thrombosis (DVT).   ·  Plan: Doppler US neg for DVT  DVT PPX.

## 2022-10-15 NOTE — CHART NOTE - NSCHARTNOTEFT_GEN_A_CORE
EVENT:  NP informed by RN, pt's refusing meds and refusing to eat.      SUBJECTIVE:  Pt agitated and stated, "my mind does not tell me to eat."  NP asked pt if she was hungry and pt stated, "No go away, leave me alone."      OBJECTIVE  REVIEW OF SYSTEMS:  Limited in exam d/t uncooperation    CONSTITUTIONAL: No fever  EYES: No acute visual disturbances  NECK: No pain or stiffness  RESPIRATORY: No cough; No shortness of breath  CARDIOVASCULAR: No chest pain, no palpitations  GASTROINTESTINAL: No pain. No nausea or vomiting.  No diarrhea   NEUROLOGICAL: No headache or numbness, no tremors  MUSCULOSKELETAL: No joint pain, no muscle pain  GENITOURINARY: No dysuria, no frequency, no hesitancy  PSYCHIATRY: No depression , no anxiety  ALL OTHER  ROS negative      PHYSICAL EXAM:  GENERAL: NAD  HEENT: Normocephalic;  conjunctivae and sclerae clear; moist mucous membranes;   NECK : supple  CHEST/LUNG: Clear to auscultation bilaterally with good air entry   HEART: S1 S2  regular; no murmurs, gallops or rubs  ABDOMEN: Soft, Nontender, Nondistended; Bowel sounds present x 4 quad  EXTREMITIES: No cyanosis; no edema; no calf tenderness  SKIN: Warm and dry; no rash  NERVOUS SYSTEM:  Awake and alert; Oriented  to place, person and time ; no new deficits      Vital Signs Last 24 Hrs  T(C): 37.1 (15 Oct 2022 10:34), Max: 37.1 (15 Oct 2022 05:18)  T(F): 98.8 (15 Oct 2022 10:34), Max: 98.8 (15 Oct 2022 05:18)  HR: 78 (15 Oct 2022 15:15) (64 - 86)  BP: 150/106 (15 Oct 2022 15:15) (137/100 - 171/93)  RR: 20 (15 Oct 2022 15:15) (16 - 20)  SpO2: 92% (15 Oct 2022 15:15) (92% - 100%)    Parameters below as of 15 Oct 2022 15:15  Patient On (Oxygen Delivery Method): room air        LABS:                        14.9   9.00  )-----------( 240      ( 15 Oct 2022 05:36 )             47.9     10-15    140  |  92<L>  |  20<H>  ----------------------------<  141<H>  4.1   |  41<H>  |  0.84    Ca    9.1      15 Oct 2022 05:36  Phos  1.8     10-15  Mg     2.0     10-15    TPro  7.7  /  Alb  3.3<L>  /  TBili  1.0  /  DBili  x   /  AST  19  /  ALT  26  /  AlkPhos  136<H>  10-15      EKG:   IMAGING:    ASSESSMENT:  HPI:  89 year old Female, from home, ambulates with RW, with PMH of COPD (not on home oxygen), HFpEF (7/29/22 EF 55-60%, LVH, GIDD, mild pulmonary HTN), Afib on Eliquis, Breast CA, HTN, and remote h/o LLE DVT sent from PCP office for Hypoxia, with associated SOB and YUAN x2d.  Admitted for AHRF likely 2/2 CHF exacerbation in the setting of medication noncompliance & COVID infection.       PLAN:    - Pt delirious, Zyprexa x I given for agitation. Continue to monitor behavior.      - Pt refused Breakfast and lunch today.  FS stable. Continue to monitor PO intake and FS.      - Refused BP meds and VS elevated.  Labetalol 10mg IV x I given.  Continue to monitor and treat BP via IV PRN.    - Refused PO Lasix.  Lasix IV x I given.      - Above discussed w/ Attending-Dr. Rothman covering Dr. Bianchi until 10/17. EVENT:  NP informed by RN, pt's refusing meds and refusing to eat.      SUBJECTIVE:  Pt agitated and stated, "my mind does not tell me to eat."  NP asked pt if she was hungry and pt stated, "No go away, leave me alone."      OBJECTIVE  REVIEW OF SYSTEMS:  Limited in exam d/t uncooperation    CONSTITUTIONAL: No fever  EYES: No acute visual disturbances  NECK: No pain or stiffness  RESPIRATORY: No cough; No shortness of breath  CARDIOVASCULAR: No chest pain, no palpitations  GASTROINTESTINAL: No pain. No nausea or vomiting.  No diarrhea   NEUROLOGICAL: No headache or numbness, no tremors  MUSCULOSKELETAL: No joint pain, no muscle pain  GENITOURINARY: No dysuria, no frequency, no hesitancy  PSYCHIATRY: No depression , no anxiety  ALL OTHER  ROS negative      PHYSICAL EXAM:  GENERAL: NAD  HEENT: Normocephalic;  conjunctivae and sclerae clear; moist mucous membranes;   NECK : supple  CHEST/LUNG: Clear to auscultation bilaterally with good air entry   HEART: S1 S2  regular; no murmurs, gallops or rubs  ABDOMEN: Soft, Nontender, Nondistended; Bowel sounds present x 4 quad  EXTREMITIES: No cyanosis; no edema; no calf tenderness  SKIN: Warm and dry; no rash  NERVOUS SYSTEM:  Awake and alert; Oriented  to place, person and time ; no new deficits      Vital Signs Last 24 Hrs  T(C): 37.1 (15 Oct 2022 10:34), Max: 37.1 (15 Oct 2022 05:18)  T(F): 98.8 (15 Oct 2022 10:34), Max: 98.8 (15 Oct 2022 05:18)  HR: 78 (15 Oct 2022 15:15) (64 - 86)  BP: 150/106 (15 Oct 2022 15:15) (137/100 - 171/93)  RR: 20 (15 Oct 2022 15:15) (16 - 20)  SpO2: 92% (15 Oct 2022 15:15) (92% - 100%)    Parameters below as of 15 Oct 2022 15:15  Patient On (Oxygen Delivery Method): room air        LABS:                        14.9   9.00  )-----------( 240      ( 15 Oct 2022 05:36 )             47.9     10-15    140  |  92<L>  |  20<H>  ----------------------------<  141<H>  4.1   |  41<H>  |  0.84    Ca    9.1      15 Oct 2022 05:36  Phos  1.8     10-15  Mg     2.0     10-15    TPro  7.7  /  Alb  3.3<L>  /  TBili  1.0  /  DBili  x   /  AST  19  /  ALT  26  /  AlkPhos  136<H>  10-15      EKG:   IMAGING:    ASSESSMENT:  HPI:  89 year old Female, from home, ambulates with RW, with PMH of COPD (not on home oxygen), HFpEF (7/29/22 EF 55-60%, LVH, GIDD, mild pulmonary HTN), Afib on Eliquis, Breast CA, HTN, and remote h/o LLE DVT sent from PCP office for Hypoxia, with associated SOB and YUAN x2d.  Admitted for AHRF likely 2/2 CHF exacerbation in the setting of medication noncompliance & COVID infection.       PLAN:    - Pt delirious, Zyprexa x I given for agitation. Continue to monitor behavior.      - Pt refused Breakfast and lunch today.  FS stable. Continue to monitor PO intake and FS.      - Refused BP meds and VS elevated.  Labetalol 10mg IV x I given.  Continue to monitor and treat BP via Labetalol 10mg IV q4h.      - Refused PO Lasix.  Lasix IV x I given.      - Above discussed w/ Attending-Dr. Hernandez covering Dr. Bianchi until 10/17.

## 2022-10-15 NOTE — PROGRESS NOTE ADULT - SUBJECTIVE AND OBJECTIVE BOX
PATIENT SEEN AND EXAMINED ON :- 10/15/22  DATE OF SERVICE:   10/15/22          Interim events noted,Labs ,Radiological studies and Cardiology tests reviewed .       HOSPITAL COURSE: HPI:  89F coming from home, ambulates with RW, with PMHx of COPD (not oh home o2), HFpEF (7/29/22 EF 55-60%, LVH, GIDD, mild pulmonary HTN), Afib on Eliquis, Breast CA, HTN, and with remote h/o LLE DVT sent from PCP office for Hypoxia, with associated SOB and YUAN x2d in the setting of medication noncompliance. According to granddaughter, Macario [phone] the patient has had SOB with YUAN for the past few days, which she attributes to not taking her Lasix for 5 days because patient ran out. Patient denies any associated chest pain or cough with SOB.  Patient also c/o chronic LE swelling, recently worsening. Also stating that she has right hip and right shoulder pain which she attributes to her arthritis. Per granddaughter, patient was admitted to [facility] in August for hypoxia and leg swelling, and treatment for heart failure, and discharged with an increased Lasix dose to 40mg BID. Per granddaughter, she ran out of Lasix after not going to her f/u PCP as the patient did not want want to go d/t clinical improvement. In the ED, patient found to be COVID positive, however denies any recent sick contact, as well as no fevers, chills, congestion and cough. Covid Vacc x3.  (10 Oct 2022 23:20)      INTERIM EVENTS:Patient seen at bedside ,interim events noted.      PMH -reviewed admission note, no change since admission  HEART FAILURE: Acute[ ]Chronic[ ] Systolic[ ] Diastolic[ ] Combined Systolic and Diastolic[ ]  CAD[ ] CABG[ ] PCI[ ]  DEVICES[ ] PPM[ ] ICD[ ] ILR[ ]  ATRIAL FIBRILLATION[ ] Paroxysmal[ ] Permanent[ ] CHADS2-[  ]  JACOBO[ ] CKD1[ ] CKD2[ ] CKD3[ ] CKD4[ ] ESRD[ ]  COPD[ ] HTN[ ]   DM[ ] Type1[ ] Type 2[ ]   CVA[ ] Paresis[ ]    AMBULATION: Assisted[ ] Cane/walker[ ] Independent[ ]    MEDICATIONS  (STANDING):  anastrozole 1 milliGRAM(s) Oral daily  apixaban 5 milliGRAM(s) Oral every 12 hours  artificial  tears Solution 1 Drop(s) Both EYES four times a day  aspirin  chewable 81 milliGRAM(s) Oral daily  carvedilol 6.25 milliGRAM(s) Oral every 12 hours  dextrose 5%. 1000 milliLiter(s) (100 mL/Hr) IV Continuous <Continuous>  dextrose 5%. 1000 milliLiter(s) (50 mL/Hr) IV Continuous <Continuous>  dextrose 50% Injectable 25 Gram(s) IV Push once  dextrose 50% Injectable 12.5 Gram(s) IV Push once  dextrose 50% Injectable 25 Gram(s) IV Push once  ferrous    sulfate 325 milliGRAM(s) Oral daily  furosemide    Tablet 40 milliGRAM(s) Oral two times a day  glucagon  Injectable 1 milliGRAM(s) IntraMuscular once  insulin lispro (ADMELOG) corrective regimen sliding scale   SubCutaneous three times a day before meals  montelukast 10 milliGRAM(s) Oral daily  simvastatin 20 milliGRAM(s) Oral at bedtime    MEDICATIONS  (PRN):  acetaminophen     Tablet .. 650 milliGRAM(s) Oral every 6 hours PRN Temp greater or equal to 38C (100.4F), Moderate Pain (4 - 6)  ALBUTerol    90 MICROgram(s) HFA Inhaler 1 Puff(s) Inhalation every 6 hours PRN Shortness of Breath and/or Wheezing  dextrose Oral Gel 15 Gram(s) Oral once PRN Blood Glucose LESS THAN 70 milliGRAM(s)/deciliter  labetalol Injectable 10 milliGRAM(s) IV Push every 4 hours PRN If pt refuses PO BP med  melatonin 3 milliGRAM(s) Oral at bedtime PRN Insomnia            REVIEW OF SYSTEMS:  Constitutional: [ ] fever, [ ]weight loss,  [ ]fatigue [ ]weight gain  Eyes: [ ] visual changes  Respiratory: [ ]shortness of breath;  [ ] cough, [ ]wheezing, [ ]chills, [ ]hemoptysis  Cardiovascular: [ ] chest pain, [ ]palpitations, [ ]dizziness,  [ ]leg swelling[ ]orthopnea[ ]PND  Gastrointestinal: [ ] abdominal pain, [ ]nausea, [ ]vomiting,  [ ]diarrhea [ ]Constipation [ ]Melena  Genitourinary: [ ] dysuria, [ ] hematuria [ ][place]  Neurologic: [ ] headaches [ ] tremors[ ]weakness [ ]Paralysis Right[ ] Left[ ]  Skin: [ ] itching, [ ]burning, [ ] rashes  Endocrine: [ ] heat or cold intolerance  Musculoskeletal: [ ] joint pain or swelling; [ ] muscle, back, or extremity pain  Psychiatric: [ ] depression, [ ]anxiety, [ ]mood swings, or [ ]difficulty sleeping  Hematologic: [ ] easy bruising, [ ] bleeding gums    [ ] All remaining systems negative except as per above.   [ ]Unable to obtain.  [x] No change in ROS since admission      Vital Signs Last 24 Hrs  T(C): 37.1 (15 Oct 2022 10:34), Max: 37.1 (15 Oct 2022 05:18)  T(F): 98.8 (15 Oct 2022 10:34), Max: 98.8 (15 Oct 2022 05:18)  HR: 83 (15 Oct 2022 18:10) (78 - 86)  BP: 159/110 (15 Oct 2022 18:10) (137/100 - 160/125)  BP(mean): --  RR: 20 (15 Oct 2022 18:10) (18 - 20)  SpO2: 92% (15 Oct 2022 15:15) (92% - 98%)    Parameters below as of 15 Oct 2022 18:10  Patient On (Oxygen Delivery Method): refused      I&O's Summary    14 Oct 2022 07:01  -  15 Oct 2022 07:00  --------------------------------------------------------  IN: 0 mL / OUT: 2200 mL / NET: -2200 mL    15 Oct 2022 07:01  -  15 Oct 2022 22:15  --------------------------------------------------------  IN: 0 mL / OUT: 800 mL / NET: -800 mL        PHYSICAL EXAM:  General: No acute distress BMI-  HEENT: EOMI, PERRL  Neck: Supple, [ ] JVD  Lungs: Equal air entry bilaterally; [ ] rales [ ] wheezing [ ] rhonchi  Heart: Regular rate and rhythm; [x ] murmur   2/6 [ x] systolic [ ] diastolic [ ] radiation[ ] rubs [ ]  gallops  Abdomen: Nontender, bowel sounds present  Extremities: No clubbing, cyanosis, [ ] edema [ ]Pulses  equal and intact  Nervous system:  Alert & Oriented X3, no focal deficits  Psychiatric: Normal affect  Skin: No rashes or lesions    LABS:  10-15    140  |  92<L>  |  20<H>  ----------------------------<  141<H>  4.1   |  41<H>  |  0.84    Ca    9.1      15 Oct 2022 05:36  Phos  1.8     10-15  Mg     2.0     10-15    TPro  7.7  /  Alb  3.3<L>  /  TBili  1.0  /  DBili  x   /  AST  19  /  ALT  26  /  AlkPhos  136<H>  10-15    Creatinine Trend: 0.84<--, 0.95<--, 1.02<--, 0.93<--, 0.91<--, 0.96<--                        14.9   9.00  )-----------( 240      ( 15 Oct 2022 05:36 )             47.9     PT/INR - ( 15 Oct 2022 05:36 )   PT: 19.9 sec;   INR: 1.66 ratio

## 2022-10-15 NOTE — PROGRESS NOTE ADULT - PROBLEM SELECTOR PLAN 2
isolation precautions  ID follow-up noted  monitor LDH, LFTs, D-Dimer, Ferritin, CRP and procalcitonin  follow up Covid PCR  follow up CXR  montelukast 10 mgs po Qhs

## 2022-10-16 LAB
ALBUMIN SERPL ELPH-MCNC: 3.4 G/DL — LOW (ref 3.5–5)
ALP SERPL-CCNC: 136 U/L — HIGH (ref 40–120)
ALT FLD-CCNC: 25 U/L DA — SIGNIFICANT CHANGE UP (ref 10–60)
ANION GAP SERPL CALC-SCNC: 12 MMOL/L — SIGNIFICANT CHANGE UP (ref 5–17)
AST SERPL-CCNC: 20 U/L — SIGNIFICANT CHANGE UP (ref 10–40)
BILIRUB SERPL-MCNC: 1.7 MG/DL — HIGH (ref 0.2–1.2)
BUN SERPL-MCNC: 22 MG/DL — HIGH (ref 7–18)
CALCIUM SERPL-MCNC: 9.1 MG/DL — SIGNIFICANT CHANGE UP (ref 8.4–10.5)
CHLORIDE SERPL-SCNC: 93 MMOL/L — LOW (ref 96–108)
CO2 SERPL-SCNC: 35 MMOL/L — HIGH (ref 22–31)
CREAT SERPL-MCNC: 1.03 MG/DL — SIGNIFICANT CHANGE UP (ref 0.5–1.3)
CRP SERPL-MCNC: 16 MG/L — HIGH
D DIMER BLD IA.RAPID-MCNC: <150 NG/ML DDU — SIGNIFICANT CHANGE UP
EGFR: 52 ML/MIN/1.73M2 — LOW
FERRITIN SERPL-MCNC: 130 NG/ML — SIGNIFICANT CHANGE UP (ref 15–150)
GLUCOSE BLDC GLUCOMTR-MCNC: 130 MG/DL — HIGH (ref 70–99)
GLUCOSE BLDC GLUCOMTR-MCNC: 144 MG/DL — HIGH (ref 70–99)
GLUCOSE BLDC GLUCOMTR-MCNC: 160 MG/DL — HIGH (ref 70–99)
GLUCOSE BLDC GLUCOMTR-MCNC: 181 MG/DL — HIGH (ref 70–99)
GLUCOSE SERPL-MCNC: 132 MG/DL — HIGH (ref 70–99)
INR BLD: 1.51 RATIO — HIGH (ref 0.88–1.16)
LDH SERPL L TO P-CCNC: 252 U/L — HIGH (ref 120–225)
MAGNESIUM SERPL-MCNC: 2 MG/DL — SIGNIFICANT CHANGE UP (ref 1.6–2.6)
PHOSPHATE SERPL-MCNC: 3.6 MG/DL — SIGNIFICANT CHANGE UP (ref 2.5–4.5)
POTASSIUM SERPL-MCNC: 3.8 MMOL/L — SIGNIFICANT CHANGE UP (ref 3.5–5.3)
POTASSIUM SERPL-SCNC: 3.8 MMOL/L — SIGNIFICANT CHANGE UP (ref 3.5–5.3)
PROCALCITONIN SERPL-MCNC: 0.04 NG/ML — SIGNIFICANT CHANGE UP (ref 0.02–0.1)
PROT SERPL-MCNC: 7.4 G/DL — SIGNIFICANT CHANGE UP (ref 6–8.3)
PROTHROM AB SERPL-ACNC: 18 SEC — HIGH (ref 10.5–13.4)
SARS-COV-2 RNA SPEC QL NAA+PROBE: DETECTED
SODIUM SERPL-SCNC: 140 MMOL/L — SIGNIFICANT CHANGE UP (ref 135–145)

## 2022-10-16 RX ORDER — ERTAPENEM SODIUM 1 G/1
1000 INJECTION, POWDER, LYOPHILIZED, FOR SOLUTION INTRAMUSCULAR; INTRAVENOUS EVERY 24 HOURS
Refills: 0 | Status: DISCONTINUED | OUTPATIENT
Start: 2022-10-16 | End: 2022-10-18

## 2022-10-16 RX ORDER — METOPROLOL TARTRATE 50 MG
5 TABLET ORAL ONCE
Refills: 0 | Status: COMPLETED | OUTPATIENT
Start: 2022-10-16 | End: 2022-10-16

## 2022-10-16 RX ADMIN — Medication 30 MILLIGRAM(S): at 14:56

## 2022-10-16 RX ADMIN — Medication 1: at 17:17

## 2022-10-16 RX ADMIN — Medication 40 MILLIGRAM(S): at 15:02

## 2022-10-16 RX ADMIN — ANASTROZOLE 1 MILLIGRAM(S): 1 TABLET ORAL at 15:02

## 2022-10-16 RX ADMIN — APIXABAN 5 MILLIGRAM(S): 2.5 TABLET, FILM COATED ORAL at 05:50

## 2022-10-16 RX ADMIN — Medication 40 MILLIGRAM(S): at 05:49

## 2022-10-16 RX ADMIN — CARVEDILOL PHOSPHATE 6.25 MILLIGRAM(S): 80 CAPSULE, EXTENDED RELEASE ORAL at 18:03

## 2022-10-16 RX ADMIN — Medication 1 DROP(S): at 01:43

## 2022-10-16 RX ADMIN — ERTAPENEM SODIUM 120 MILLIGRAM(S): 1 INJECTION, POWDER, LYOPHILIZED, FOR SOLUTION INTRAMUSCULAR; INTRAVENOUS at 20:14

## 2022-10-16 RX ADMIN — CARVEDILOL PHOSPHATE 6.25 MILLIGRAM(S): 80 CAPSULE, EXTENDED RELEASE ORAL at 05:50

## 2022-10-16 RX ADMIN — APIXABAN 5 MILLIGRAM(S): 2.5 TABLET, FILM COATED ORAL at 18:03

## 2022-10-16 RX ADMIN — Medication 5 MILLIGRAM(S): at 14:51

## 2022-10-16 RX ADMIN — Medication 1 DROP(S): at 05:49

## 2022-10-16 RX ADMIN — SIMVASTATIN 20 MILLIGRAM(S): 20 TABLET, FILM COATED ORAL at 21:18

## 2022-10-16 RX ADMIN — Medication 81 MILLIGRAM(S): at 14:57

## 2022-10-16 NOTE — PROGRESS NOTE ADULT - SUBJECTIVE AND OBJECTIVE BOX
PATIENT SEEN AND EXAMINED ON :- 10/16/22  DATE OF SERVICE:  10/16/22           Interim events noted,Labs ,Radiological studies and Cardiology tests reviewed .       HOSPITAL COURSE: HPI:  89F coming from home, ambulates with RW, with PMHx of COPD (not oh home o2), HFpEF (7/29/22 EF 55-60%, LVH, GIDD, mild pulmonary HTN), Afib on Eliquis, Breast CA, HTN, and with remote h/o LLE DVT sent from PCP office for Hypoxia, with associated SOB and YUAN x2d in the setting of medication noncompliance. According to granddaughter, Macario [phone] the patient has had SOB with YUAN for the past few days, which she attributes to not taking her Lasix for 5 days because patient ran out. Patient denies any associated chest pain or cough with SOB.  Patient also c/o chronic LE swelling, recently worsening. Also stating that she has right hip and right shoulder pain which she attributes to her arthritis. Per granddaughter, patient was admitted to [facility] in August for hypoxia and leg swelling, and treatment for heart failure, and discharged with an increased Lasix dose to 40mg BID. Per granddaughter, she ran out of Lasix after not going to her f/u PCP as the patient did not want want to go d/t clinical improvement. In the ED, patient found to be COVID positive, however denies any recent sick contact, as well as no fevers, chills, congestion and cough. Covid Vacc x3.  (10 Oct 2022 23:20)      INTERIM EVENTS:Patient seen at bedside ,interim events noted.      PMH -reviewed admission note, no change since admission  HEART FAILURE: Acute[ ]Chronic[ ] Systolic[ ] Diastolic[ ] Combined Systolic and Diastolic[ ]  CAD[ ] CABG[ ] PCI[ ]  DEVICES[ ] PPM[ ] ICD[ ] ILR[ ]  ATRIAL FIBRILLATION[ ] Paroxysmal[ ] Permanent[ ] CHADS2-[  ]  JACOBO[ ] CKD1[ ] CKD2[ ] CKD3[ ] CKD4[ ] ESRD[ ]  COPD[ ] HTN[ ]   DM[ ] Type1[ ] Type 2[ ]   CVA[ ] Paresis[ ]    AMBULATION: Assisted[ ] Cane/walker[ ] Independent[ ]    MEDICATIONS  (STANDING):  anastrozole 1 milliGRAM(s) Oral daily  apixaban 5 milliGRAM(s) Oral every 12 hours  artificial  tears Solution 1 Drop(s) Both EYES four times a day  aspirin  chewable 81 milliGRAM(s) Oral daily  carvedilol 6.25 milliGRAM(s) Oral every 12 hours  cholecalciferol 1000 Unit(s) Oral daily  dextrose 5%. 1000 milliLiter(s) (100 mL/Hr) IV Continuous <Continuous>  dextrose 5%. 1000 milliLiter(s) (50 mL/Hr) IV Continuous <Continuous>  dextrose 50% Injectable 25 Gram(s) IV Push once  dextrose 50% Injectable 12.5 Gram(s) IV Push once  dextrose 50% Injectable 25 Gram(s) IV Push once  ertapenem  IVPB 1000 milliGRAM(s) IV Intermittent every 24 hours  ferrous    sulfate 325 milliGRAM(s) Oral daily  furosemide    Tablet 40 milliGRAM(s) Oral two times a day  glucagon  Injectable 1 milliGRAM(s) IntraMuscular once  insulin lispro (ADMELOG) corrective regimen sliding scale   SubCutaneous three times a day before meals  montelukast 10 milliGRAM(s) Oral daily  simvastatin 20 milliGRAM(s) Oral at bedtime    MEDICATIONS  (PRN):  acetaminophen     Tablet .. 650 milliGRAM(s) Oral every 6 hours PRN Temp greater or equal to 38C (100.4F), Moderate Pain (4 - 6)  ALBUTerol    90 MICROgram(s) HFA Inhaler 1 Puff(s) Inhalation every 6 hours PRN Shortness of Breath and/or Wheezing  dextrose Oral Gel 15 Gram(s) Oral once PRN Blood Glucose LESS THAN 70 milliGRAM(s)/deciliter  labetalol Injectable 10 milliGRAM(s) IV Push every 4 hours PRN If pt refuses PO BP med  melatonin 3 milliGRAM(s) Oral at bedtime PRN Insomnia            REVIEW OF SYSTEMS:  Constitutional: [ ] fever, [ ]weight loss,  [ ]fatigue [ ]weight gain  Eyes: [ ] visual changes  Respiratory: [ ]shortness of breath;  [ ] cough, [ ]wheezing, [ ]chills, [ ]hemoptysis  Cardiovascular: [ ] chest pain, [ ]palpitations, [ ]dizziness,  [ ]leg swelling[ ]orthopnea[ ]PND  Gastrointestinal: [ ] abdominal pain, [ ]nausea, [ ]vomiting,  [ ]diarrhea [ ]Constipation [ ]Melena  Genitourinary: [ ] dysuria, [ ] hematuria [ ]Hawley  Neurologic: [ ] headaches [ ] tremors[ ]weakness [ ]Paralysis Right[ ] Left[ ]  Skin: [ ] itching, [ ]burning, [ ] rashes  Endocrine: [ ] heat or cold intolerance  Musculoskeletal: [ ] joint pain or swelling; [ ] muscle, back, or extremity pain  Psychiatric: [ ] depression, [ ]anxiety, [ ]mood swings, or [ ]difficulty sleeping  Hematologic: [ ] easy bruising, [ ] bleeding gums    [ ] All remaining systems negative except as per above.   [ ]Unable to obtain.  [x] No change in ROS since admission      Vital Signs Last 24 Hrs  T(C): 36.2 (16 Oct 2022 18:12), Max: 37.1 (16 Oct 2022 00:46)  T(F): 97.2 (16 Oct 2022 18:12), Max: 98.8 (16 Oct 2022 00:46)  HR: 74 (16 Oct 2022 18:12) (74 - 102)  BP: 154/90 (16 Oct 2022 18:12) (100/68 - 154/90)  BP(mean): --  RR: 20 (16 Oct 2022 18:12) (18 - 20)  SpO2: 92% (16 Oct 2022 18:12) (92% - 96%)    Parameters below as of 16 Oct 2022 18:12  Patient On (Oxygen Delivery Method): room air      I&O's Summary    15 Oct 2022 07:01  -  16 Oct 2022 07:00  --------------------------------------------------------  IN: 0 mL / OUT: 800 mL / NET: -800 mL        PHYSICAL EXAM:  General: No acute distress BMI-  HEENT: EOMI, PERRL  Neck: Supple, [ ] JVD  Lungs: Equal air entry bilaterally; [ ] rales [ ] wheezing [ ] rhonchi  Heart: Regular rate and rhythm; [x ] murmur   2/6 [ x] systolic [ ] diastolic [ ] radiation[ ] rubs [ ]  gallops  Abdomen: Nontender, bowel sounds present  Extremities: No clubbing, cyanosis, [ ] edema [ ]Pulses  equal and intact  Nervous system:  Alert & Oriented X3, no focal deficits  Psychiatric: Normal affect  Skin: No rashes or lesions    LABS:  10-16    140  |  93<L>  |  22<H>  ----------------------------<  132<H>  3.8   |  35<H>  |  1.03    Ca    9.1      16 Oct 2022 07:57  Phos  3.6     10-16  Mg     2.0     10-16    TPro  7.4  /  Alb  3.4<L>  /  TBili  1.7<H>  /  DBili  x   /  AST  20  /  ALT  25  /  AlkPhos  136<H>  10-16    Creatinine Trend: 1.03<--, 0.84<--, 0.95<--, 1.02<--, 0.93<--, 0.91<--                        14.9   9.00  )-----------( 240      ( 15 Oct 2022 05:36 )             47.9     PT/INR - ( 16 Oct 2022 07:57 )   PT: 18.0 sec;   INR: 1.51 ratio

## 2022-10-16 NOTE — PROGRESS NOTE ADULT - PROBLEM SELECTOR PLAN 2
isolation precautions  ID follow-up noted  monitor LDH, LFTs, D-Dimer, Ferritin, CRP and procalcitonin  follow up Covid PCR  Vit D  montelukast 10 mgs po Qhs  Bronchodilators prn

## 2022-10-16 NOTE — PROGRESS NOTE ADULT - SUBJECTIVE AND OBJECTIVE BOX
Patient is a 89y old  Female who presents with a chief complaint of SOB (16 Oct 2022 08:43)  Awake, alert, laying in bed in NAD. Confused, agitated earlier. Doing well on RA    INTERVAL HPI/OVERNIGHT EVENTS:      VITAL SIGNS:  T(F): 98.1 (10-16-22 @ 05:31)  HR: 102 (10-16-22 @ 05:31)  BP: 131/85 (10-16-22 @ 05:31)  RR: 20 (10-16-22 @ 05:31)  SpO2: 96% (10-16-22 @ 05:31)  Wt(kg): --  I&O's Detail    15 Oct 2022 07:01  -  16 Oct 2022 07:00  --------------------------------------------------------  IN:  Total IN: 0 mL    OUT:    Voided (mL): 800 mL  Total OUT: 800 mL    Total NET: -800 mL              REVIEW OF SYSTEMS:    CONSTITUTIONAL:  No fevers, chills, sweats    HEENT:  Eyes:  No diplopia or blurred vision. ENT:  No earache, sore throat or runny nose.    CARDIOVASCULAR:  No pressure, squeezing, tightness, or heaviness about the chest; no palpitations.    RESPIRATORY:  Per HPI    GASTROINTESTINAL:  No abdominal pain, nausea, vomiting or diarrhea.    GENITOURINARY:  No dysuria, frequency or urgency.    NEUROLOGIC:  No paresthesias, fasciculations, seizures or weakness.    PSYCHIATRIC:  No disorder of thought or mood.      PHYSICAL EXAM:    Constitutional: Well developed and nourished  Eyes:Perrla  ENMT: normal  Neck:supple  Respiratory: good air entry  Cardiovascular: S1 S2 regular  Gastrointestinal: Soft, Non tender  Extremities: No edema  Vascular:normal  Neurological:Awake, alert  Musculoskeletal:Normal      MEDICATIONS  (STANDING):  anastrozole 1 milliGRAM(s) Oral daily  apixaban 5 milliGRAM(s) Oral every 12 hours  artificial  tears Solution 1 Drop(s) Both EYES four times a day  aspirin  chewable 81 milliGRAM(s) Oral daily  carvedilol 6.25 milliGRAM(s) Oral every 12 hours  cholecalciferol 1000 Unit(s) Oral daily  dextrose 5%. 1000 milliLiter(s) (50 mL/Hr) IV Continuous <Continuous>  dextrose 5%. 1000 milliLiter(s) (100 mL/Hr) IV Continuous <Continuous>  dextrose 50% Injectable 25 Gram(s) IV Push once  dextrose 50% Injectable 12.5 Gram(s) IV Push once  dextrose 50% Injectable 25 Gram(s) IV Push once  ferrous    sulfate 325 milliGRAM(s) Oral daily  furosemide    Tablet 40 milliGRAM(s) Oral two times a day  glucagon  Injectable 1 milliGRAM(s) IntraMuscular once  insulin lispro (ADMELOG) corrective regimen sliding scale   SubCutaneous three times a day before meals  montelukast 10 milliGRAM(s) Oral daily  predniSONE   Tablet 30 milliGRAM(s) Oral once  simvastatin 20 milliGRAM(s) Oral at bedtime    MEDICATIONS  (PRN):  acetaminophen     Tablet .. 650 milliGRAM(s) Oral every 6 hours PRN Temp greater or equal to 38C (100.4F), Moderate Pain (4 - 6)  ALBUTerol    90 MICROgram(s) HFA Inhaler 1 Puff(s) Inhalation every 6 hours PRN Shortness of Breath and/or Wheezing  dextrose Oral Gel 15 Gram(s) Oral once PRN Blood Glucose LESS THAN 70 milliGRAM(s)/deciliter  labetalol Injectable 10 milliGRAM(s) IV Push every 4 hours PRN If pt refuses PO BP med  melatonin 3 milliGRAM(s) Oral at bedtime PRN Insomnia      Allergies    losartan (Angioedema)    Intolerances    Chicken (Stomach Upset)      LABS:                        14.9   9.00  )-----------( 240      ( 15 Oct 2022 05:36 )             47.9     10-16    140  |  93<L>  |  22<H>  ----------------------------<  132<H>  3.8   |  35<H>  |  1.03    Ca    9.1      16 Oct 2022 07:57  Phos  3.6     10-16  Mg     2.0     10-16    TPro  7.4  /  Alb  3.4<L>  /  TBili  1.7<H>  /  DBili  x   /  AST  20  /  ALT  25  /  AlkPhos  136<H>  10-16    PT/INR - ( 16 Oct 2022 07:57 )   PT: 18.0 sec;   INR: 1.51 ratio                   CAPILLARY BLOOD GLUCOSE      POCT Blood Glucose.: 130 mg/dL (16 Oct 2022 07:56)  POCT Blood Glucose.: 149 mg/dL (15 Oct 2022 22:09)  POCT Blood Glucose.: 156 mg/dL (15 Oct 2022 17:11)  POCT Blood Glucose.: 124 mg/dL (15 Oct 2022 11:19)    pro-bnp -- 10-16 @ 07:57     d-dimer <150  10-16 @ 07:57  pro-bnp -- 10-13 @ 05:41     d-dimer <150  10-13 @ 05:41  pro-bnp 1763 10-10 @ 17:50     d-dimer --  10-10 @ 17:50      RADIOLOGY & ADDITIONAL TESTS:    CXR:  < from: Xray Chest 1 View AP/PA (10.10.22 @ 18:43) >  IMPRESSION:   No radiographic evidence of active chest disease.    < end of copied text >    Ct scan chest:    ekg;    echo:

## 2022-10-16 NOTE — PROGRESS NOTE ADULT - SUBJECTIVE AND OBJECTIVE BOX
INTERVAL HPI/OVERNIGHT EVENTS:  Patient seen,events noticed,no distress  VITAL SIGNS:  T(F): 98.1 (10-16-22 @ 05:31)  HR: 102 (10-16-22 @ 05:31)  BP: 131/85 (10-16-22 @ 05:31)  RR: 20 (10-16-22 @ 05:31)  SpO2: 96% (10-16-22 @ 05:31)  Wt(kg): --    PHYSICAL EXAM:  awake  Constitutional:  Eyes:  ENMT:perrla  Neck:  Respiratory:clear  Cardiovascular:s1s2,m-none  Gastrointestinal:soft,bs pos  Extremities:  Vascular:  Neurological:no focal defiict  Musculoskeletal:    MEDICATIONS  (STANDING):  anastrozole 1 milliGRAM(s) Oral daily  apixaban 5 milliGRAM(s) Oral every 12 hours  artificial  tears Solution 1 Drop(s) Both EYES four times a day  aspirin  chewable 81 milliGRAM(s) Oral daily  carvedilol 6.25 milliGRAM(s) Oral every 12 hours  cholecalciferol 1000 Unit(s) Oral daily  dextrose 5%. 1000 milliLiter(s) (100 mL/Hr) IV Continuous <Continuous>  dextrose 5%. 1000 milliLiter(s) (50 mL/Hr) IV Continuous <Continuous>  dextrose 50% Injectable 25 Gram(s) IV Push once  dextrose 50% Injectable 12.5 Gram(s) IV Push once  dextrose 50% Injectable 25 Gram(s) IV Push once  ferrous    sulfate 325 milliGRAM(s) Oral daily  furosemide    Tablet 40 milliGRAM(s) Oral two times a day  glucagon  Injectable 1 milliGRAM(s) IntraMuscular once  insulin lispro (ADMELOG) corrective regimen sliding scale   SubCutaneous three times a day before meals  montelukast 10 milliGRAM(s) Oral daily  predniSONE   Tablet 30 milliGRAM(s) Oral once  simvastatin 20 milliGRAM(s) Oral at bedtime    MEDICATIONS  (PRN):  acetaminophen     Tablet .. 650 milliGRAM(s) Oral every 6 hours PRN Temp greater or equal to 38C (100.4F), Moderate Pain (4 - 6)  ALBUTerol    90 MICROgram(s) HFA Inhaler 1 Puff(s) Inhalation every 6 hours PRN Shortness of Breath and/or Wheezing  dextrose Oral Gel 15 Gram(s) Oral once PRN Blood Glucose LESS THAN 70 milliGRAM(s)/deciliter  labetalol Injectable 10 milliGRAM(s) IV Push every 4 hours PRN If pt refuses PO BP med  melatonin 3 milliGRAM(s) Oral at bedtime PRN Insomnia      Allergies    losartan (Angioedema)    Intolerances    Chicken (Stomach Upset)      LABS:                        14.9   9.00  )-----------( 240      ( 15 Oct 2022 05:36 )             47.9     10-15    140  |  92<L>  |  20<H>  ----------------------------<  141<H>  4.1   |  41<H>  |  0.84    Ca    9.1      15 Oct 2022 05:36  Phos  1.8     10-15  Mg     2.0     10-15    TPro  7.7  /  Alb  3.3<L>  /  TBili  1.0  /  DBili  x   /  AST  19  /  ALT  26  /  AlkPhos  136<H>  10-15    PT/INR - ( 15 Oct 2022 05:36 )   PT: 19.9 sec;   INR: 1.66 ratio               RADIOLOGY & ADDITIONAL TESTS:      Assessment and Plan:    Problem/Plan - 1:  ·  Problem: Acute respiratory failure with hypoxia-stable cliniclly   ·  Plan: improved  cont oxygen supp  monitor resp status.     Problem/Plan - 2:  ·  Problem: 2019 novel coronavirus disease (COVID-19).   ·  Plan: isolation precautions  ID follow-up noted  follow up Covid PCR  montelukast 10 mgs po Qhs.     Problem/Plan - 3:  ·  Problem: Acute on chronic diastolic congestive heart failure.   ·  Plan: lasix IV prn  R/o ACS protocol  cont meds  Cardio follow up.     Problem/Plan - 4:  ·  Problem: COPD (chronic obstructive pulmonary disease).   ·  Plan: Bronchodilators prn  symbicort /4.5 mcgs 2 puffs po BID  Spiriva  PFTs as OP.     Problem/Plan - 5:  ·  Problem: Pulmonary hypertension.   ·  Plan: bronchodilators prn  oxygen supp prn  CCB.     Problem/Plan - 6:  ·  Problem: Afib.   ·  Plan: Tele monitoring  cont meds  Cardio follow up.     Problem/Plan - 7:  ·  Problem: HTN (hypertension).   ·  Plan: monitor BP  cont meds.     Problem/Plan - 8:  ·  Problem: Suspected deep vein thrombosis (DVT).   ·  Plan: Doppler US neg for DVT  DVT PPX.

## 2022-10-16 NOTE — PROGRESS NOTE ADULT - ASSESSMENT
Patient is a 89y  old female  coming from home, with PMHx of COPD (not oh home o2), HFpEF (7/29/22 EF 55-60%, LVH, GIDD, mild pulmonary HTN), Afib on Eliquis, Breast CA, HTN, and with remote h/o LLE DVT sent from PCP office for evaluation of Hypoxia, with associated SOB and YUAN x2d in the setting of medication noncompliance.  Patient has not taking her Lasix for last 5 days because she ran out  of Lasix. Per granddaughter, patient was admitted to [facility] in August for hypoxia and leg swelling, and treatment for heart failure, and discharged with an increased Lasix dose to 40mg BID.  On admission, she found to have no fever, but tachypnea, hypoxia to 84 % in Room air and positive COVID PCR. She has started on Remdesivir and Dexamethasone, and the ID consult requested to assist with further evaluation and antibiotic management.    # COVID Pneumonitis  # Acute Hypoxic Respiratory failure- on oxygen via NC  # The urine culture grew Klebsiella and Aerococcus in the setting of only 3 to 5 WBC in Urine analysis  # COVID VS Metabolic encephalitis - may benefit from treating the mild positive UA    would recommend:    1. Please start Ertapenem 1 g daily to treat ESBL  Klebsiella since became confused , sometimes mild component of UTI can worsens the confusion  2. Supportive care including AC  3. Supplemental oxygenation and Bronchodilator as needed  4. Aspiration and COVID precautions      Attending Attestation:    Spent more than 35 minutes on total encounter, more than 50 % of the visit was spent counseling and/or coordinating care by the Attending physician.

## 2022-10-16 NOTE — PROGRESS NOTE ADULT - SUBJECTIVE AND OBJECTIVE BOX
Patient is seen and examined at the bed side, is afebrile. She becomes more confused and combative.       REVIEW OF SYSTEMS: Unable to obtain due to mental status      ALLERGIES: Chicken (Stomach Upset)  losartan (Angioedema)      Vital Signs Last 24 Hrs  T(C): 36.4 (16 Oct 2022 13:30), Max: 37.1 (16 Oct 2022 00:46)  T(F): 97.5 (16 Oct 2022 13:30), Max: 98.8 (16 Oct 2022 00:46)  HR: 82 (16 Oct 2022 13:30) (82 - 102)  BP: 100/68 (16 Oct 2022 13:30) (100/68 - 152/106)  BP(mean): --  RR: 18 (16 Oct 2022 13:30) (18 - 20)  SpO2: 93% (16 Oct 2022 13:30) (93% - 96%)    Parameters below as of 16 Oct 2022 13:30  Patient On (Oxygen Delivery Method): room air        PHYSICAL EXAM:  GENERAL: Not in acute distress, off oxygen   CHEST/LUNG: Not using accessory muscles   CNS: confused  - Please refer to Primary team's note for rest of the systems      LABS: No new Labs                         14.9   9.00  )-----------( 240      ( 15 Oct 2022 05:36 )             47.9                         13.1   8.94  )-----------( 210      ( 13 Oct 2022 05:41 )             42.2       10-15    140  |  92<L>  |  20<H>  ----------------------------<  141<H>  4.1   |  41<H>  |  0.84    Ca    9.1      15 Oct 2022 05:36  Phos  1.8     10-15  Mg     2.0     10-15    TPro  7.7  /  Alb  3.3<L>  /  TBili  1.0  /  DBili  x   /  AST  19  /  ALT  26  /  AlkPhos  136<H>  10-15    10-13    138  |  95<L>  |  x   ----------------------------<  x   4.9   |  x   |  x     Ca    5.3<LL>      13 Oct 2022 05:41  Mg     2.0     10-13    TPro  6.8  /  Alb  1.5<L>  /  TBili  0.4  /  DBili  x   /  AST  37  /  ALT  19  /  AlkPhos  116  10-13    PT/INR - ( 11 Oct 2022 05:01 )   PT: 17.5 sec;   INR: 1.46 ratio      PTT - ( 10 Oct 2022 19:00 )  PTT:41.1 sec        CAPILLARY BLOOD GLUCOSE  POCT Blood Glucose.: 164 mg/dL (11 Oct 2022 12:06)  POCT Blood Glucose.: 163 mg/dL (11 Oct 2022 08:27)  POCT Blood Glucose.: 142 mg/dL (11 Oct 2022 02:15)        Urinalysis Basic - ( 11 Oct 2022 06:05 )  Color: Yellow / Appearance: Clear / S.005 / pH: x  Gluc: x / Ketone: Negative  / Bili: Negative / Urobili: Negative   Blood: x / Protein: Negative / Nitrite: Negative   Leuk Esterase: Negative / RBC: 0-2 /HPF / WBC 3-5 /HPF   Sq Epi: x / Non Sq Epi: Few /HPF / Bacteria: Trace /HPF        MEDICATIONS  (STANDING):    anastrozole 1 milliGRAM(s) Oral daily  apixaban 5 milliGRAM(s) Oral every 12 hours  artificial  tears Solution 1 Drop(s) Both EYES four times a day  aspirin  chewable 81 milliGRAM(s) Oral daily  carvedilol 6.25 milliGRAM(s) Oral every 12 hours  cholecalciferol 1000 Unit(s) Oral daily  ferrous    sulfate 325 milliGRAM(s) Oral daily  furosemide    Tablet 40 milliGRAM(s) Oral two times a day  glucagon  Injectable 1 milliGRAM(s) IntraMuscular once  insulin lispro (ADMELOG) corrective regimen sliding scale   SubCutaneous three times a day before meals  montelukast 10 milliGRAM(s) Oral daily  simvastatin 20 milliGRAM(s) Oral at bedtime      RADIOLOGY & ADDITIONAL TESTS:    10/10/22 : Xray Chest 1 View AP/PA (10.10.22 @ 18:43) IMPRESSION:   No radiographic evidence of active chest disease.      10/10/22 : US Duplex Venous Lower Ext Ltd, Left (10.10.22 @ 18:42) Limited study with limited compression secondary to swelling.  No obvious left lower extremity deep venous thrombosis.      MICROBIOLOGY DATA:    Culture - Urine (10.11.22 @ 06:05)   - Amikacin: S <=16   - Amoxicillin/Clavulanic Acid: S <=8/4   - Ampicillin: R >16 These ampicillin results predict results for amoxicillin   - Ampicillin/Sulbactam: I 16/8 Enterobacter, Klebsiella aerogenes, Citrobacter, and Serratia may develop resistance during prolonged therapy (3-4 days)   - Aztreonam: R >16   - Cefazolin: R >16 For uncomplicated UTI with K. pneumoniae, E. coli, or P. mirablis: NIDIA <=16 is sensitive and NIDIA >=32 is resistant. This also predicts results for oral agents cefaclor, cefdinir, cefpodoxime, cefprozil, cefuroxime axetil, cephalexin and locarbef for uncomplicated UTI. Note that some isolates may be susceptible to these agents while testing resistant to cefazolin.   - Cefepime: R >16   - Ceftriaxone: R >32 Enterobacter, Klebsiella aerogenes, Citrobacter, and Serratia may develop resistance during prolonged therapy   - Ciprofloxacin: S <=0.25   - Ertapenem: S <=0.5   - Gentamicin: S <=2   - Imipenem: S <=1   - Levofloxacin: R 2   - Meropenem: S <=1   - Nitrofurantoin: I 64 Should not be used to treat pyelonephritis   - Piperacillin/Tazobactam: S <=8   - Trimethoprim/Sulfamethoxazole: R >2/38   - Tigecycline: S <=2   - Tobramycin: S <=2   Specimen Source: Clean Catch Clean Catch (Midstream)   Culture Results:   50,000 - 99,000 CFU/mL Klebsiella pneumoniae ESBL   10,000 - 49,000 CFU/mL Aerococcus species   "Aerococcus spp. are predictably susceptible to penicillin,   ampicillin, tetracycline, and vancomycin. All isolates are   resistant to sulfonamides"   Organism Identification: Klebsiella pneumoniae ESBL   Culture - Urine (10.11.22 @ 06:05)   Specimen Source: Clean Catch Clean Catch (Midstream)   Culture Results: 50,000 - 99,000 CFU/mL Klebsiella pneumoniae   10,000 - 49,000 CFU/mL Aerococcus species   "Aerococcus spp. are predictably susceptible to penicillin,   ampicillin, tetracycline, and vancomycin. All isolates are   resistant to sulfonamides"     Urine Microscopic-Add On (NC) (10.11.22 @ 06:05)   Epithelial Cells: Few /HPF   Red Blood Cell - Urine: 0-2 /HPF   White Blood Cell - Urine: 3-5 /HPF   Bacteria: Trace /HPF     Flu With COVID-19 By PATRICK (10.10.22 @ 17:50)   SARS-CoV-2 Result: Detected:

## 2022-10-17 DIAGNOSIS — Z02.9 ENCOUNTER FOR ADMINISTRATIVE EXAMINATIONS, UNSPECIFIED: ICD-10-CM

## 2022-10-17 DIAGNOSIS — Z71.89 OTHER SPECIFIED COUNSELING: ICD-10-CM

## 2022-10-17 LAB
ALBUMIN SERPL ELPH-MCNC: 2.9 G/DL — LOW (ref 3.5–5)
ALP SERPL-CCNC: 128 U/L — HIGH (ref 40–120)
ALT FLD-CCNC: 22 U/L DA — SIGNIFICANT CHANGE UP (ref 10–60)
ANION GAP SERPL CALC-SCNC: 7 MMOL/L — SIGNIFICANT CHANGE UP (ref 5–17)
AST SERPL-CCNC: 16 U/L — SIGNIFICANT CHANGE UP (ref 10–40)
BILIRUB SERPL-MCNC: 1.1 MG/DL — SIGNIFICANT CHANGE UP (ref 0.2–1.2)
BUN SERPL-MCNC: 32 MG/DL — HIGH (ref 7–18)
CALCIUM SERPL-MCNC: 8.7 MG/DL — SIGNIFICANT CHANGE UP (ref 8.4–10.5)
CHLORIDE SERPL-SCNC: 95 MMOL/L — LOW (ref 96–108)
CO2 SERPL-SCNC: 41 MMOL/L — HIGH (ref 22–31)
CREAT SERPL-MCNC: 1.15 MG/DL — SIGNIFICANT CHANGE UP (ref 0.5–1.3)
EGFR: 45 ML/MIN/1.73M2 — LOW
GLUCOSE BLDC GLUCOMTR-MCNC: 122 MG/DL — HIGH (ref 70–99)
GLUCOSE BLDC GLUCOMTR-MCNC: 131 MG/DL — HIGH (ref 70–99)
GLUCOSE BLDC GLUCOMTR-MCNC: 133 MG/DL — HIGH (ref 70–99)
GLUCOSE BLDC GLUCOMTR-MCNC: 172 MG/DL — HIGH (ref 70–99)
GLUCOSE SERPL-MCNC: 142 MG/DL — HIGH (ref 70–99)
INR BLD: 1.85 RATIO — HIGH (ref 0.88–1.16)
MAGNESIUM SERPL-MCNC: 2.3 MG/DL — SIGNIFICANT CHANGE UP (ref 1.6–2.6)
PHOSPHATE SERPL-MCNC: 5.4 MG/DL — HIGH (ref 2.5–4.5)
POTASSIUM SERPL-MCNC: 4.3 MMOL/L — SIGNIFICANT CHANGE UP (ref 3.5–5.3)
POTASSIUM SERPL-SCNC: 4.3 MMOL/L — SIGNIFICANT CHANGE UP (ref 3.5–5.3)
PROT SERPL-MCNC: 7.1 G/DL — SIGNIFICANT CHANGE UP (ref 6–8.3)
PROTHROM AB SERPL-ACNC: 22.2 SEC — HIGH (ref 10.5–13.4)
SODIUM SERPL-SCNC: 143 MMOL/L — SIGNIFICANT CHANGE UP (ref 135–145)

## 2022-10-17 RX ADMIN — Medication 40 MILLIGRAM(S): at 05:51

## 2022-10-17 RX ADMIN — Medication 40 MILLIGRAM(S): at 15:29

## 2022-10-17 RX ADMIN — Medication 20 MILLIGRAM(S): at 18:25

## 2022-10-17 RX ADMIN — Medication 1 DROP(S): at 05:50

## 2022-10-17 RX ADMIN — CARVEDILOL PHOSPHATE 6.25 MILLIGRAM(S): 80 CAPSULE, EXTENDED RELEASE ORAL at 18:22

## 2022-10-17 RX ADMIN — Medication 1 DROP(S): at 01:16

## 2022-10-17 RX ADMIN — MONTELUKAST 10 MILLIGRAM(S): 4 TABLET, CHEWABLE ORAL at 12:35

## 2022-10-17 RX ADMIN — SIMVASTATIN 20 MILLIGRAM(S): 20 TABLET, FILM COATED ORAL at 21:44

## 2022-10-17 RX ADMIN — APIXABAN 5 MILLIGRAM(S): 2.5 TABLET, FILM COATED ORAL at 05:50

## 2022-10-17 RX ADMIN — CARVEDILOL PHOSPHATE 6.25 MILLIGRAM(S): 80 CAPSULE, EXTENDED RELEASE ORAL at 05:50

## 2022-10-17 RX ADMIN — APIXABAN 5 MILLIGRAM(S): 2.5 TABLET, FILM COATED ORAL at 18:24

## 2022-10-17 RX ADMIN — ERTAPENEM SODIUM 120 MILLIGRAM(S): 1 INJECTION, POWDER, LYOPHILIZED, FOR SOLUTION INTRAMUSCULAR; INTRAVENOUS at 18:24

## 2022-10-17 RX ADMIN — Medication 1 DROP(S): at 18:22

## 2022-10-17 NOTE — PROGRESS NOTE ADULT - PROBLEM SELECTOR PLAN 6
- Noted to be COVID (+) in the ED, denies any active respiratory symptoms or sick contacts  - Desaturating to 87% on RA, baseline 90-95% with a h/o COPD not on home o2  - Started on Remdesivir x 5 days and Decadron x10 days  -  C/w Remdesivir, D4.  Last day 10/15.    - S/p Decadron, 10/14.  Transitioned to Prednisone and tapered   - Unable to obtain ambulatory O2 deconditioned, PT recommended [facility].  However, resting and sitting oxygen saturation obtained an documented.  Pt does not warrant home oxygen at this time.    10/14 S/p supplemental oxygen.  Currently in RA sating 93-95% - Maintain O2 88-92% for COPD.  - CXR noted to have b/l infiltrates with no evidence of consolidation  - 10/14 Repeat CXR-F/u results    - Continue to monitory inflammatory markers(d-dimer, procalcitonin, LDH, ferritin, ESR, CRP) Q3days  - ID-Dr. Denton consulted, appreciated - Likely in the setting of CHF exacerbation in the setting of medication noncompliance   - At baseline patient ambulates with rolling walker  - Reports chronic LE swelling  - S/p LLE doppler negative DVT  - C/o chronic right hip pain, denies fall or trauma   - PT recommended AMY but family will take pt home.  Has a CDPAP  - reeval for home O2   - will set up home PT on discharge

## 2022-10-17 NOTE — PROGRESS NOTE ADULT - PROBLEM SELECTOR PLAN 1
- Corrected Ca=7.3.  Supplemented  - Continue to monitor Ca  - Vit D-Pt refused AM bloodwork - Patient was sent from PCP office for Hypoxia in the setting of SOB on exertion and b/l pitting edema,  - Known history of HFpEF  - Likely secondary to CHF exacerbation in the setting of medication noncompliance   - ProBNP 1763   - Trop 168  - SpO2  84 % on RA in the ED, dyspneic on exertion.  S/p Lasix 40mg IVP x1 and Decadron 6mg IVPx1, and started on Remdesivir in the ED  -completed remdesivier  -completing steroids today    - CXR noted to have b/l infiltrates with no evidence of consolidation-findings consistent with pulmonary congestion  - 10/14 Repeat CXR-F/u results     - S/p LLE Doppler negative for DVT   - Lasix IV de-escalated to PO home dose.  C/w Lasix 40mg PO BID.     - C/w Albuterol & Singulair  - Cardiology-Dr. Denton consulted, appreciated    - Pulmonary-Dr. Son consulted, appreciated  -97% on 2L - will obtain ambulating O2 saturation on RA for home O2

## 2022-10-17 NOTE — PROGRESS NOTE ADULT - PROBLEM SELECTOR PLAN 6
- Likely in the setting of CHF exacerbation in the setting of medication noncompliance   - At baseline patient ambulates with rolling walker  - Reports chronic LE swelling  - S/p LLE doppler negative DVT  - C/o chronic right hip pain, denies fall or trauma   - PT recommended [facility] but family will take pt home.  Has a CDPAP  - reeval for home O2   - will set up home PT on discharge

## 2022-10-17 NOTE — PROGRESS NOTE ADULT - ASSESSMENT
89 year old Female, from home, ambulates with RW, with PMH of COPD (not on home oxygen), HFpEF (7/29/22 EF 55-60%, LVH, GIDD, mild pulmonary HTN), Afib on Eliquis, Breast CA, HTN, and remote h/o LLE DVT sent from PCP office for Hypoxia, with associated SOB and YUAN x2d.  Admitted for AHRF likely 2/2 CHF exacerbation in the setting of medication noncompliance & COVID infection.   Seen and examined pt at bedside, she does not want to be bothered today but lungs sound was clear and maintains good O2 saturation on supplemental O2 with 2l/min. Pt did not have good appetite over the weekend but today, she ate good in the morning and afternoon. Spoke with granddaughter Ms Dietz via phone and had a discussion at length including GOC , full code at this time, encouraged her to have a family discussion as she has Acute on chronic HF and COPD with COVID infection which can affect on her respiratory. She willing to take her back home not sending her to [facility] as isolation makes her depress so family want to take her home after 10 days of Quarantine. pt is on supplemental oxygen via [place] 2L - will obtain off oxygen saturation to see if she qualifies for home O2

## 2022-10-17 NOTE — DIETITIAN NUTRITION RISK NOTIFICATION - TREATMENT: THE FOLLOWING DIET HAS BEEN RECOMMENDED
Diet, Regular:   Consistent Carbohydrate {No Snacks}  DASH/TLC {Sodium & Cholesterol Restricted}  Easy to Chew (EASYTOCHEW)  Egg Restricted  No Egg  No Poultry  No Shellfish  Supplement Feeding Modality:  Oral  Glucerna Shake Cans or Servings Per Day:  1       Frequency:  Two Times a day (10-17-22 @ 10:13) [Pending Verification By Attending]

## 2022-10-17 NOTE — PROGRESS NOTE ADULT - PROBLEM SELECTOR PLAN 2
- Patient was sent from PCP office for Hypoxia in the setting of SOB on exertion and b/l pitting edema,  - Known history of HFpEF  - Likely secondary to CHF exacerbation in the setting of medication noncompliance   - ProBNP 1763   - Trop 168  - SpO2  84 % on RA in the ED, dyspneic on exertion.  S/p Lasix 40mg IVP x1 and Decadron 6mg IVPx1, and started on Remdesivir in the ED.  10/14 S/p supplemental oxygen.  Currently in RA sating 93-95%   - CXR noted to have b/l infiltrates with no evidence of consolidation-findings consistent with pulmonary congestion  - 10/14 Repeat CXR-F/u results     - S/p LLE Doppler negative for DVT   - Lasix IV de-escalated to PO home dose.  C/w Lasix 40mg PO BID.     - C/w Albuterol & Singulair  - Cardiology-Dr. Denton consulted, appreciated    - Pulmonary-Dr. Son consulted, appreciated - see plans as above   - C/w Coreg & Lasix   - C/w Strict I&O's.  Daily wt.    - Cardiology-Dr. Denton consulted, appreciated

## 2022-10-17 NOTE — PROGRESS NOTE ADULT - PROBLEM SELECTOR PLAN 10
- Patient has a history of LLE DVT  - Diagnosed two years ago with A-fib diagnosis  - C/w Eliquis   - S/p US of LLE negative for DVT - C/w Lovenox for DVT ppx

## 2022-10-17 NOTE — DIETITIAN INITIAL EVALUATION ADULT - PERTINENT LABORATORY DATA
10-17    143  |  95<L>  |  32<H>  ----------------------------<  142<H>  4.3   |  41<H>  |  1.15    Ca    8.7      17 Oct 2022 05:40  Phos  5.4     10-17  Mg     2.3     10-17    TPro  7.1  /  Alb  2.9<L>  /  TBili  1.1  /  DBili  x   /  AST  16  /  ALT  22  /  AlkPhos  128<H>  10-17  POCT Blood Glucose.: 122 mg/dL (10-17-22 @ 08:16)  A1C with Estimated Average Glucose Result: 7.0 % (07-29-22 @ 06:25)  A1C with Estimated Average Glucose Result: 6.5 % (11-15-21 @ 09:31)

## 2022-10-17 NOTE — PROGRESS NOTE ADULT - ASSESSMENT
Patient is a 89y  old female  coming from home, with PMHx of COPD (not oh home o2), HFpEF (7/29/22 EF 55-60%, LVH, GIDD, mild pulmonary HTN), Afib on Eliquis, Breast CA, HTN, and with remote h/o LLE DVT sent from PCP office for evaluation of Hypoxia, with associated SOB and YUAN x2d in the setting of medication noncompliance.  Patient has not taking her Lasix for last 5 days because she ran out  of Lasix. Per granddaughter, patient was admitted to [facility] in August for hypoxia and leg swelling, and treatment for heart failure, and discharged with an increased Lasix dose to 40mg BID.  On admission, she found to have no fever, but tachypnea, hypoxia to 84 % in Room air and positive COVID PCR. She has started on Remdesivir and Dexamethasone, and the ID consult requested to assist with further evaluation and antibiotic management.    # COVID Pneumonitis  # Acute Hypoxic Respiratory failure- on oxygen via NC  # The urine culture grew Klebsiella and Aerococcus in the setting of only 3 to 5 WBC in Urine analysis-  will benefit form treating ESBL  Klebsiella since became confused , sometimes mild component of UTI can worsens the confusion  # COVID VS Metabolic encephalitis - may benefit from treating the mild positive UA    would recommend:    1. Continue Ertapenem 1 g daily since mental status is improving after Adding Ertapenem   2. Supportive care including AC  3. Supplemental oxygenation and Bronchodilator as needed  4. Aspiration and COVID precautions    d/w Covering NPAzalia    Attending Attestation:    Spent more than 35 minutes on total encounter, more than 50 % of the visit was spent counseling and/or coordinating care by the Attending physician.

## 2022-10-17 NOTE — PROGRESS NOTE ADULT - PROBLEM SELECTOR PLAN 12
- C/w Lovenox for DVT ppx - family will take her back home once she is completed quarantine x 10 days - 10/21   -f/u on home O2 reeval

## 2022-10-17 NOTE — PROGRESS NOTE ADULT - PROBLEM SELECTOR PLAN 12
- family will take her back home once she is completed quarantine x 10 days - 10/21   -f/u on home O2 reeval

## 2022-10-17 NOTE — PROGRESS NOTE ADULT - PROBLEM SELECTOR PLAN 5
- Resolved   - Elevated Potassium=6.3 on admission  - No EKG changes noted  - Likely in the setting of CHF exacerbation d/t medication noncompliance  - S/p Hyperkalemia cocktail  - S/p Lokelma   - Continue to monitor Potassium

## 2022-10-17 NOTE — DIETITIAN INITIAL EVALUATION ADULT - PERTINENT MEDS FT
MEDICATIONS  (STANDING):  anastrozole 1 milliGRAM(s) Oral daily  apixaban 5 milliGRAM(s) Oral every 12 hours  artificial  tears Solution 1 Drop(s) Both EYES four times a day  aspirin  chewable 81 milliGRAM(s) Oral daily  carvedilol 6.25 milliGRAM(s) Oral every 12 hours  cholecalciferol 1000 Unit(s) Oral daily  dextrose 5%. 1000 milliLiter(s) (50 mL/Hr) IV Continuous <Continuous>  dextrose 5%. 1000 milliLiter(s) (100 mL/Hr) IV Continuous <Continuous>  dextrose 50% Injectable 25 Gram(s) IV Push once  dextrose 50% Injectable 12.5 Gram(s) IV Push once  dextrose 50% Injectable 25 Gram(s) IV Push once  ertapenem  IVPB 1000 milliGRAM(s) IV Intermittent every 24 hours  ferrous    sulfate 325 milliGRAM(s) Oral daily  furosemide    Tablet 40 milliGRAM(s) Oral two times a day  glucagon  Injectable 1 milliGRAM(s) IntraMuscular once  insulin lispro (ADMELOG) corrective regimen sliding scale   SubCutaneous three times a day before meals  montelukast 10 milliGRAM(s) Oral daily  predniSONE   Tablet 20 milliGRAM(s) Oral once  simvastatin 20 milliGRAM(s) Oral at bedtime    MEDICATIONS  (PRN):  acetaminophen     Tablet .. 650 milliGRAM(s) Oral every 6 hours PRN Temp greater or equal to 38C (100.4F), Moderate Pain (4 - 6)  ALBUTerol    90 MICROgram(s) HFA Inhaler 1 Puff(s) Inhalation every 6 hours PRN Shortness of Breath and/or Wheezing  dextrose Oral Gel 15 Gram(s) Oral once PRN Blood Glucose LESS THAN 70 milliGRAM(s)/deciliter  labetalol Injectable 10 milliGRAM(s) IV Push every 4 hours PRN If pt refuses PO BP med  melatonin 3 milliGRAM(s) Oral at bedtime PRN Insomnia

## 2022-10-17 NOTE — DIETITIAN INITIAL EVALUATION ADULT - OTHER INFO
Pt lives alone at home with HHA help PTA, COVID19+lilly, in airborne/contact isolation room, confused per chart, able to view from door window, asleep, breakfast untouched yet; 0 to 25% intake at times per flowsheet, Limited intake/wt change history data available at present; Intolerance to chicken, no Egg, No poultry, No shellfish per Chart; DfrO7R=4, finger stick arjk=968 to 364, h/o Pre-DM, on Steroid Rx noted

## 2022-10-17 NOTE — DIETITIAN INITIAL EVALUATION ADULT - DIET TYPE
No Egg, No Poultry, No Shellfish; Add Glucerna Shake 1can bid as medically feasible (440kcal, 20g protein)/consistent carbohydrate (no snacks)/DASH/TLC (sodium and cholesterol restricted diet)/easy to chew

## 2022-10-17 NOTE — PROGRESS NOTE ADULT - SUBJECTIVE AND OBJECTIVE BOX
Patient is seen and examined at the bed side, is afebrile. She is doing little better today and start eating.       REVIEW OF SYSTEMS: Unable to obtain due to mental status      ALLERGIES: Chicken (Stomach Upset)  losartan (Angioedema)      Vital Signs Last 24 Hrs  T(C): 37.1 (17 Oct 2022 13:09), Max: 37.1 (17 Oct 2022 05:16)  T(F): 98.8 (17 Oct 2022 13:09), Max: 98.8 (17 Oct 2022 05:16)  HR: 67 (17 Oct 2022 13:09) (67 - 94)  BP: 110/87 (17 Oct 2022 13:09) (108/90 - 116/81)  BP(mean): --  RR: 19 (17 Oct 2022 13:09) (17 - 20)  SpO2: 100% (17 Oct 2022 13:09) (97% - 100%)    Parameters below as of 17 Oct 2022 13:09  Patient On (Oxygen Delivery Method): nasal cannula  O2 Flow (L/min): 2      PHYSICAL EXAM:  GENERAL: Not in acute distress, off oxygen   CHEST/LUNG: Not using accessory muscles   CNS: Awake and more alert   Please refer to Primary team's note for rest of the systems      LABS: No new Labs                           14.9   9.00  )-----------( 240      ( 15 Oct 2022 05:36 )             47.9                         13.1   8.94  )-----------( 210      ( 13 Oct 2022 05:41 )             42.2       10-15    140  |  92<L>  |  20<H>  ----------------------------<  141<H>  4.1   |  41<H>  |  0.84    Ca    9.1      15 Oct 2022 05:36  Phos  1.8     10-15  Mg     2.0     10-15    TPro  7.7  /  Alb  3.3<L>  /  TBili  1.0  /  DBili  x   /  AST  19  /  ALT  26  /  AlkPhos  136<H>  10-15    10-13    138  |  95<L>  |  x   ----------------------------<  x   4.9   |  x   |  x     Ca    5.3<LL>      13 Oct 2022 05:41  Mg     2.0     10-13    TPro  6.8  /  Alb  1.5<L>  /  TBili  0.4  /  DBili  x   /  AST  37  /  ALT  19  /  AlkPhos  116  10    PT/INR - ( 11 Oct 2022 05:01 )   PT: 17.5 sec;   INR: 1.46 ratio      PTT - ( 10 Oct 2022 19:00 )  PTT:41.1 sec        CAPILLARY BLOOD GLUCOSE  POCT Blood Glucose.: 164 mg/dL (11 Oct 2022 12:06)  POCT Blood Glucose.: 163 mg/dL (11 Oct 2022 08:27)  POCT Blood Glucose.: 142 mg/dL (11 Oct 2022 02:15)        Urinalysis Basic - ( 11 Oct 2022 06:05 )  Color: Yellow / Appearance: Clear / S.005 / pH: x  Gluc: x / Ketone: Negative  / Bili: Negative / Urobili: Negative   Blood: x / Protein: Negative / Nitrite: Negative   Leuk Esterase: Negative / RBC: 0-2 /HPF / WBC 3-5 /HPF   Sq Epi: x / Non Sq Epi: Few /HPF / Bacteria: Trace /HPF        MEDICATIONS  (STANDING):    anastrozole 1 milliGRAM(s) Oral daily  apixaban 5 milliGRAM(s) Oral every 12 hours  artificial  tears Solution 1 Drop(s) Both EYES four times a day  aspirin  chewable 81 milliGRAM(s) Oral daily  carvedilol 6.25 milliGRAM(s) Oral every 12 hours  cholecalciferol 1000 Unit(s) Oral daily  ertapenem  IVPB 1000 milliGRAM(s) IV Intermittent every 24 hours  ferrous    sulfate 325 milliGRAM(s) Oral daily  furosemide    Tablet 40 milliGRAM(s) Oral two times a day  glucagon  Injectable 1 milliGRAM(s) IntraMuscular once  insulin lispro (ADMELOG) corrective regimen sliding scale   SubCutaneous three times a day before meals  montelukast 10 milliGRAM(s) Oral daily  simvastatin 20 milliGRAM(s) Oral at bedtime        RADIOLOGY & ADDITIONAL TESTS:    10/10/22 : Xray Chest 1 View AP/PA (10.10.22 @ 18:43) IMPRESSION:   No radiographic evidence of active chest disease.      10/10/22 : US Duplex Venous Lower Ext Ltd, Left (10.10.22 @ 18:42) Limited study with limited compression secondary to swelling.  No obvious left lower extremity deep venous thrombosis.      MICROBIOLOGY DATA:    Culture - Urine (10.11.22 @ 06:05)   - Amikacin: S <=16   - Amoxicillin/Clavulanic Acid: S <=8/4   - Ampicillin: R >16 These ampicillin results predict results for amoxicillin   - Ampicillin/Sulbactam: I 16/8 Enterobacter, Klebsiella aerogenes, Citrobacter, and Serratia may develop resistance during prolonged therapy (3-4 days)   - Aztreonam: R >16   - Cefazolin: R >16 For uncomplicated UTI with K. pneumoniae, E. coli, or P. mirablis: NIDIA <=16 is sensitive and NIDIA >=32 is resistant. This also predicts results for oral agents cefaclor, cefdinir, cefpodoxime, cefprozil, cefuroxime axetil, cephalexin and locarbef for uncomplicated UTI. Note that some isolates may be susceptible to these agents while testing resistant to cefazolin.   - Cefepime: R >16   - Ceftriaxone: R >32 Enterobacter, Klebsiella aerogenes, Citrobacter, and Serratia may develop resistance during prolonged therapy   - Ciprofloxacin: S <=0.25   - Ertapenem: S <=0.5   - Gentamicin: S <=2   - Imipenem: S <=1   - Levofloxacin: R 2   - Meropenem: S <=1   - Nitrofurantoin: I 64 Should not be used to treat pyelonephritis   - Piperacillin/Tazobactam: S <=8   - Trimethoprim/Sulfamethoxazole: R >2/38   - Tigecycline: S <=2   - Tobramycin: S <=2   Specimen Source: Clean Catch Clean Catch (Midstream)   Culture Results:   50,000 - 99,000 CFU/mL Klebsiella pneumoniae ESBL   10,000 - 49,000 CFU/mL Aerococcus species   "Aerococcus spp. are predictably susceptible to penicillin,   ampicillin, tetracycline, and vancomycin. All isolates are   resistant to sulfonamides"   Organism Identification: Klebsiella pneumoniae ESBL   Culture - Urine (10.11.22 @ 06:05)   Specimen Source: Clean Catch Clean Catch (Midstream)   Culture Results: 50,000 - 99,000 CFU/mL Klebsiella pneumoniae   10,000 - 49,000 CFU/mL Aerococcus species   "Aerococcus spp. are predictably susceptible to penicillin,   ampicillin, tetracycline, and vancomycin. All isolates are   resistant to sulfonamides"     Urine Microscopic-Add On (NC) (10.11.22 @ 06:05)   Epithelial Cells: Few /HPF   Red Blood Cell - Urine: 0-2 /HPF   White Blood Cell - Urine: 3-5 /HPF   Bacteria: Trace /HPF     Flu With COVID-19 By PATRICK (10.10.22 @ 17:50)   SARS-CoV-2 Result: Detected:

## 2022-10-17 NOTE — PROGRESS NOTE ADULT - SUBJECTIVE AND OBJECTIVE BOX
NP Note discussed with  Primary Attending    Patient is a 89y old  Female who presents with a chief complaint of SOB (17 Oct 2022 11:06)      INTERVAL HPI/OVERNIGHT EVENTS: no new complaints    MEDICATIONS  (STANDING):  anastrozole 1 milliGRAM(s) Oral daily  apixaban 5 milliGRAM(s) Oral every 12 hours  artificial  tears Solution 1 Drop(s) Both EYES four times a day  aspirin  chewable 81 milliGRAM(s) Oral daily  carvedilol 6.25 milliGRAM(s) Oral every 12 hours  cholecalciferol 1000 Unit(s) Oral daily  dextrose 5%. 1000 milliLiter(s) (50 mL/Hr) IV Continuous <Continuous>  dextrose 5%. 1000 milliLiter(s) (100 mL/Hr) IV Continuous <Continuous>  dextrose 50% Injectable 25 Gram(s) IV Push once  dextrose 50% Injectable 12.5 Gram(s) IV Push once  dextrose 50% Injectable 25 Gram(s) IV Push once  ertapenem  IVPB 1000 milliGRAM(s) IV Intermittent every 24 hours  ferrous    sulfate 325 milliGRAM(s) Oral daily  furosemide    Tablet 40 milliGRAM(s) Oral two times a day  glucagon  Injectable 1 milliGRAM(s) IntraMuscular once  insulin lispro (ADMELOG) corrective regimen sliding scale   SubCutaneous three times a day before meals  montelukast 10 milliGRAM(s) Oral daily  predniSONE   Tablet 20 milliGRAM(s) Oral once  simvastatin 20 milliGRAM(s) Oral at bedtime    MEDICATIONS  (PRN):  acetaminophen     Tablet .. 650 milliGRAM(s) Oral every 6 hours PRN Temp greater or equal to 38C (100.4F), Moderate Pain (4 - 6)  ALBUTerol    90 MICROgram(s) HFA Inhaler 1 Puff(s) Inhalation every 6 hours PRN Shortness of Breath and/or Wheezing  dextrose Oral Gel 15 Gram(s) Oral once PRN Blood Glucose LESS THAN 70 milliGRAM(s)/deciliter  labetalol Injectable 10 milliGRAM(s) IV Push every 4 hours PRN If pt refuses PO BP med  melatonin 3 milliGRAM(s) Oral at bedtime PRN Insomnia      __________________________________________________  REVIEW OF SYSTEMS:    CONSTITUTIONAL: No fever,   EYES: no acute visual disturbances  NECK: No pain or stiffness  RESPIRATORY: No cough; No shortness of breath  CARDIOVASCULAR: No chest pain, no palpitations  GASTROINTESTINAL: No pain. No nausea or vomiting; No diarrhea   NEUROLOGICAL: No headache or numbness, no tremors  MUSCULOSKELETAL: No joint pain, no muscle pain  GENITOURINARY: no dysuria, no frequency, no hesitancy  PSYCHIATRY: no depression , no anxiety  ALL OTHER  ROS negative        Vital Signs Last 24 Hrs  T(C): 37.1 (17 Oct 2022 05:16), Max: 37.1 (17 Oct 2022 05:16)  T(F): 98.8 (17 Oct 2022 05:16), Max: 98.8 (17 Oct 2022 05:16)  HR: 94 (17 Oct 2022 05:16) (74 - 94)  BP: 108/90 (17 Oct 2022 05:16) (100/68 - 154/90)  BP(mean): --  RR: 20 (17 Oct 2022 05:16) (17 - 20)  SpO2: 97% (17 Oct 2022 05:16) (92% - 100%)    Parameters below as of 17 Oct 2022 05:16  Patient On (Oxygen Delivery Method): nasal cannula  O2 Flow (L/min): 2      ________________________________________________  PHYSICAL EXAM:  GENERAL: NAD  HEENT: Normocephalic;  conjunctivae and sclerae clear; moist mucous membranes;   NECK : supple  CHEST/LUNG: Clear to auscultation bilaterally with good air entry   HEART: S1 S2  regular; no murmurs, gallops or rubs  ABDOMEN: Soft, Nontender, Nondistended; Bowel sounds present  EXTREMITIES: no cyanosis; no edema; no calf tenderness  SKIN: warm and dry; no rash  NERVOUS SYSTEM:  Awake and alert; Oriented  to place, person and time ; no new deficits    _________________________________________________  LABS:    10-17    143  |  95<L>  |  32<H>  ----------------------------<  142<H>  4.3   |  41<H>  |  1.15    Ca    8.7      17 Oct 2022 05:40  Phos  5.4     10-17  Mg     2.3     10-17    TPro  7.1  /  Alb  2.9<L>  /  TBili  1.1  /  DBili  x   /  AST  16  /  ALT  22  /  AlkPhos  128<H>  10-17    PT/INR - ( 17 Oct 2022 10:35 )   PT: 22.2 sec;   INR: 1.85 ratio             CAPILLARY BLOOD GLUCOSE      POCT Blood Glucose.: 131 mg/dL (17 Oct 2022 12:11)  POCT Blood Glucose.: 122 mg/dL (17 Oct 2022 08:16)  POCT Blood Glucose.: 181 mg/dL (16 Oct 2022 22:02)  POCT Blood Glucose.: 160 mg/dL (16 Oct 2022 17:13)        RADIOLOGY & ADDITIONAL TESTS:    Imaging Personally Reviewed:  YES/NO    Consultant(s) Notes Reviewed:   YES/ No    Care Discussed with Consultants :     Plan of care was discussed with patient and /or primary care giver; all questions and concerns were addressed and care was aligned with patient's wishes.     NP Note discussed with  Primary Attending    Patient is a 89y old  Female who presents with a chief complaint of SOB (17 Oct 2022 11:06)      INTERVAL HPI/OVERNIGHT EVENTS: no new complaints    MEDICATIONS  (STANDING):  anastrozole 1 milliGRAM(s) Oral daily  apixaban 5 milliGRAM(s) Oral every 12 hours  artificial  tears Solution 1 Drop(s) Both EYES four times a day  aspirin  chewable 81 milliGRAM(s) Oral daily  carvedilol 6.25 milliGRAM(s) Oral every 12 hours  cholecalciferol 1000 Unit(s) Oral daily  dextrose 5%. 1000 milliLiter(s) (50 mL/Hr) IV Continuous <Continuous>  dextrose 5%. 1000 milliLiter(s) (100 mL/Hr) IV Continuous <Continuous>  dextrose 50% Injectable 25 Gram(s) IV Push once  dextrose 50% Injectable 12.5 Gram(s) IV Push once  dextrose 50% Injectable 25 Gram(s) IV Push once  ertapenem  IVPB 1000 milliGRAM(s) IV Intermittent every 24 hours  ferrous    sulfate 325 milliGRAM(s) Oral daily  furosemide    Tablet 40 milliGRAM(s) Oral two times a day  glucagon  Injectable 1 milliGRAM(s) IntraMuscular once  insulin lispro (ADMELOG) corrective regimen sliding scale   SubCutaneous three times a day before meals  montelukast 10 milliGRAM(s) Oral daily  predniSONE   Tablet 20 milliGRAM(s) Oral once  simvastatin 20 milliGRAM(s) Oral at bedtime    MEDICATIONS  (PRN):  acetaminophen     Tablet .. 650 milliGRAM(s) Oral every 6 hours PRN Temp greater or equal to 38C (100.4F), Moderate Pain (4 - 6)  ALBUTerol    90 MICROgram(s) HFA Inhaler 1 Puff(s) Inhalation every 6 hours PRN Shortness of Breath and/or Wheezing  dextrose Oral Gel 15 Gram(s) Oral once PRN Blood Glucose LESS THAN 70 milliGRAM(s)/deciliter  labetalol Injectable 10 milliGRAM(s) IV Push every 4 hours PRN If pt refuses PO BP med  melatonin 3 milliGRAM(s) Oral at bedtime PRN Insomnia      __________________________________________________  REVIEW OF SYSTEMS:    unable to obtain full ROS as pt refusing to answer     Vital Signs Last 24 Hrs  T(C): 37.1 (17 Oct 2022 05:16), Max: 37.1 (17 Oct 2022 05:16)  T(F): 98.8 (17 Oct 2022 05:16), Max: 98.8 (17 Oct 2022 05:16)  HR: 94 (17 Oct 2022 05:16) (74 - 94)  BP: 108/90 (17 Oct 2022 05:16) (100/68 - 154/90)  BP(mean): --  RR: 20 (17 Oct 2022 05:16) (17 - 20)  SpO2: 97% (17 Oct 2022 05:16) (92% - 100%)    Parameters below as of 17 Oct 2022 05:16  Patient On (Oxygen Delivery Method): nasal cannula  O2 Flow (L/min): 2      ________________________________________________  PHYSICAL EXAM:  GENERAL: NAD + 2L/min via N-C   HEENT: Normocephalic;  conjunctivae and sclerae clear; moist mucous membranes;   NECK : supple  CHEST/LUNG: Clear to auscultation bilaterally with good air entry   HEART: S1 S2  regular; no murmurs, gallops or rubs  ABDOMEN: Soft, Nontender, Nondistended; Bowel sounds present  EXTREMITIES: no cyanosis; no edema; no calf tenderness  SKIN: warm and dry; no rash  NERVOUS SYSTEM:  Awake and alert; Oriented  to person  ; no new deficits    _________________________________________________  LABS:    10-17    143  |  95<L>  |  32<H>  ----------------------------<  142<H>  4.3   |  41<H>  |  1.15    Ca    8.7      17 Oct 2022 05:40  Phos  5.4     10-17  Mg     2.3     10-17    TPro  7.1  /  Alb  2.9<L>  /  TBili  1.1  /  DBili  x   /  AST  16  /  ALT  22  /  AlkPhos  128<H>  10-17    PT/INR - ( 17 Oct 2022 10:35 )   PT: 22.2 sec;   INR: 1.85 ratio             CAPILLARY BLOOD GLUCOSE      POCT Blood Glucose.: 131 mg/dL (17 Oct 2022 12:11)  POCT Blood Glucose.: 122 mg/dL (17 Oct 2022 08:16)  POCT Blood Glucose.: 181 mg/dL (16 Oct 2022 22:02)  POCT Blood Glucose.: 160 mg/dL (16 Oct 2022 17:13)        RADIOLOGY & ADDITIONAL TESTS:  < from: Xray Chest 1 View AP/PA (10.10.22 @ 18:43) >    ACC: [id number] EXAM:  XR CHEST AP OR PA 1V                          PROCEDURE DATE:  10/10/2022          INTERPRETATION:  Portable chest radiograph    CLINICAL INFORMATION: Dyspnea, shortness of breath.    TECHNIQUE:  Portable  AP chest radiograph.    COMPARISON: 7/28/2022 chest .    FINDINGS:  CATHETERS AND TUBES: None    PULMONARY: The visualized lungs are clear of airspace consolidations or   effusions.   No pneumothorax.    HEART/VASCULAR: The heart is mildly enlarged in transverse diameter. .    BONES: Visualized osseous structures are intact.    IMPRESSION:   No radiographic evidence of active chest disease.    --- End of Report ---            CARMEN NICK MD; Attending Radiologist  This document has been electronically signed. Oct 10 2022  8:34PM    < end of copied text >    Imaging Personally Reviewed:  YES    Consultant(s) Notes Reviewed:   YES    Care Discussed with Consultants : pulm ID and cardiology     Plan of care was discussed with patient and /or primary care giver; all questions and concerns were addressed and care was aligned with patient's wishes.

## 2022-10-17 NOTE — PROGRESS NOTE ADULT - PROBLEM SELECTOR PLAN 1
- Patient was sent from PCP office for Hypoxia in the setting of SOB on exertion and b/l pitting edema,  - Known history of HFpEF  - Likely secondary to CHF exacerbation in the setting of medication noncompliance   - ProBNP 1763   - Trop 168  - SpO2  84 % on RA in the ED, dyspneic on exertion.  S/p Lasix 40mg IVP x1 and Decadron 6mg IVPx1, and started on Remdesivir in the ED  -completed remdesivier  -completing steroids today    - CXR noted to have b/l infiltrates with no evidence of consolidation-findings consistent with pulmonary congestion  - 10/14 Repeat CXR-F/u results     - S/p LLE Doppler negative for DVT   - Lasix IV de-escalated to PO home dose.  C/w Lasix 40mg PO BID.     - C/w Albuterol & Singulair  - pulm f/u

## 2022-10-17 NOTE — PROGRESS NOTE ADULT - ASSESSMENT
89 year old Female, from home, ambulates with RW, with PMH of COPD (not on home oxygen), HFpEF (7/29/22 EF 55-60%, LVH, GIDD, mild pulmonary HTN), Afib on Eliquis, Breast CA, HTN, and remote h/o LLE DVT sent from PCP office for Hypoxia, with associated SOB and YUAN x2d.  Admitted for AHRF likely 2/2 CHF exacerbation in the setting of medication noncompliance & COVID infection.       10/14 Pt refused AM bloodwork    89 year old Female, from home, ambulates with RW, with PMH of COPD (not on home oxygen), HFpEF (7/29/22 EF 55-60%, LVH, GIDD, mild pulmonary HTN), Afib on Eliquis, Breast CA, HTN, and remote h/o LLE DVT sent from PCP office for Hypoxia, with associated SOB and YUAN x2d.  Admitted for AHRF likely 2/2 CHF exacerbation in the setting of medication noncompliance & COVID infection.   Seen and examined pt at bedside, she does not want to be bothered today but lungs sound was clear and maintains good O2 saturation on supplemental O2 with 2l/min. Pt did not have good appetite over the weekend but today, she ate good in the morning and afternoon. Spoke with granddaughter Ms Dietz via phone and had a discussion at length including GOC , full code at this time, encouraged her to have a family discussion as she has Acute on chronic HF and COPD with COVID infection which can affect on her respiratory. She willing to take her back home not sending her to [facility] as isolation makes her depress so family want to take her home after 10 days of Quarantine        89 year old Female, from home, ambulates with RW, with PMH of COPD (not on home oxygen), HFpEF (7/29/22 EF 55-60%, LVH, GIDD, mild pulmonary HTN), Afib on Eliquis, Breast CA, HTN, and remote h/o LLE DVT sent from PCP office for Hypoxia, with associated SOB and YUAN x2d.  Admitted for AHRF likely 2/2 CHF exacerbation in the setting of medication noncompliance & COVID infection.   Seen and examined pt at bedside, she does not want to be bothered today but lungs sound was clear and maintains good O2 saturation on supplemental O2 with 2l/min. Pt did not have good appetite over the weekend but today, she ate good in the morning and afternoon. Spoke with granddaughter Ms Dietz via phone and had a discussion at length including GOC , full code at this time, encouraged her to have a family discussion as she has Acute on chronic HF and COPD with COVID infection which can affect on her respiratory. She willing to take her back home not sending her to [facility] as isolation makes her depress so family want to take her home after 10 days of Quarantine. pt is on supplemental oxygen via [place] 2L - will obtain off oxygen saturation to see if she qualifies for home O2

## 2022-10-17 NOTE — PROGRESS NOTE ADULT - PROBLEM SELECTOR PLAN 11
- Patient has a h/o breast CA with chemotherapy / radiation  - C/w Anastrazole - discussed with granddaughter Mirela at length - full code, encouraged her to have family discussion as she is declining and have comorbidity her home number : [phone]  [phone]

## 2022-10-17 NOTE — PROGRESS NOTE ADULT - PROBLEM SELECTOR PLAN 11
- discussed with granddaughter Mirela at length - full code, encouraged her to have family discussion as she is declining and have comorbidity her home number : [phone]  [phone]

## 2022-10-17 NOTE — DIETITIAN INITIAL EVALUATION ADULT - ETIOLOGY
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

## 2022-10-17 NOTE — PROGRESS NOTE ADULT - PROBLEM SELECTOR PLAN 5
- Likely in the setting of CHF exacerbation in the setting of medication noncompliance   - At baseline patient ambulates with rolling walker  - Reports chronic LE swelling  - S/p LLE doppler negative DVT  - C/o chronic right hip pain, denies fall or trauma   - PT recommended AMY but family will take pt home.  Has a CDPAP. - Resolved   - Elevated Potassium=6.3 on admission  - No EKG changes noted  - Likely in the setting of CHF exacerbation d/t medication noncompliance  - S/p Hyperkalemia cocktail  - S/p Lokelma   - Continue to monitor Potassium

## 2022-10-17 NOTE — PROGRESS NOTE ADULT - PROBLEM SELECTOR PLAN 7
- Noted to be COVID (+) in the ED, denies any active respiratory symptoms or sick contacts  - Desaturating to 87% on RA, baseline 90-95% with a h/o COPD not on home o2  - Started on Remdesivir x 5 days and Decadron x10 days  -  C/w Remdesivir, D4.  Last day 10/15.    - S/p Decadron, 10/14.  Transitioned to Prednisone and tapered   - Unable to obtain ambulatory O2 deconditioned, PT recommended AMY.  However, resting and sitting oxygen saturation obtained an documented.  Pt does not warrant home oxygen at this time.    10/14 S/p supplemental oxygen.  Currently in RA sating 93-95% - Maintain O2 88-92% for COPD.  - CXR noted to have b/l infiltrates with no evidence of consolidation  - 10/14 Repeat CXR-F/u results    - Continue to monitory inflammatory markers(d-dimer, procalcitonin, LDH, ferritin, ESR, CRP) Q3days  - ID-Dr. Denton consulted, appreciated

## 2022-10-17 NOTE — PROGRESS NOTE ADULT - SUBJECTIVE AND OBJECTIVE BOX
Patient is a 89y old  Female who presents with a chief complaint of Infection due to severe acute respiratory syndrome coronavirus 2 (SARS-CoV-2)  Awake, alert, comfortable in bed in NAD. Doing well on oxygen supp via NC. Leg  edema improved   (17 Oct 2022 09:20)      INTERVAL HPI/OVERNIGHT EVENTS:      VITAL SIGNS:  T(F): 98.8 (10-17-22 @ 05:16)  HR: 94 (10-17-22 @ 05:16)  BP: 108/90 (10-17-22 @ 05:16)  RR: 20 (10-17-22 @ 05:16)  SpO2: 97% (10-17-22 @ 05:16)  Wt(kg): --  I&O's Detail          REVIEW OF SYSTEMS:    CONSTITUTIONAL:  No fevers, chills, sweats    HEENT:  Eyes:  No diplopia or blurred vision. ENT:  No earache, sore throat or runny nose.    CARDIOVASCULAR:  No pressure, squeezing, tightness, or heaviness about the chest; no palpitations.    RESPIRATORY:  Per HPI    GASTROINTESTINAL:  No abdominal pain, nausea, vomiting or diarrhea.    GENITOURINARY:  No dysuria, frequency or urgency.    NEUROLOGIC:  No paresthesias, fasciculations, seizures or weakness.    PSYCHIATRIC:  No disorder of thought or mood.      PHYSICAL EXAM:    Constitutional: Well developed and nourished  Eyes:Perrla  ENMT: normal  Neck:supple  Respiratory: good air entry  Cardiovascular: S1 S2 regular  Gastrointestinal: Soft, Non tender  Extremities: No edema  Vascular:normal  Neurological:Awake, alert,Ox3  Musculoskeletal:Normal      MEDICATIONS  (STANDING):  anastrozole 1 milliGRAM(s) Oral daily  apixaban 5 milliGRAM(s) Oral every 12 hours  artificial  tears Solution 1 Drop(s) Both EYES four times a day  aspirin  chewable 81 milliGRAM(s) Oral daily  carvedilol 6.25 milliGRAM(s) Oral every 12 hours  cholecalciferol 1000 Unit(s) Oral daily  dextrose 5%. 1000 milliLiter(s) (50 mL/Hr) IV Continuous <Continuous>  dextrose 5%. 1000 milliLiter(s) (100 mL/Hr) IV Continuous <Continuous>  dextrose 50% Injectable 25 Gram(s) IV Push once  dextrose 50% Injectable 12.5 Gram(s) IV Push once  dextrose 50% Injectable 25 Gram(s) IV Push once  ertapenem  IVPB 1000 milliGRAM(s) IV Intermittent every 24 hours  ferrous    sulfate 325 milliGRAM(s) Oral daily  furosemide    Tablet 40 milliGRAM(s) Oral two times a day  glucagon  Injectable 1 milliGRAM(s) IntraMuscular once  insulin lispro (ADMELOG) corrective regimen sliding scale   SubCutaneous three times a day before meals  montelukast 10 milliGRAM(s) Oral daily  predniSONE   Tablet 20 milliGRAM(s) Oral once  simvastatin 20 milliGRAM(s) Oral at bedtime    MEDICATIONS  (PRN):  acetaminophen     Tablet .. 650 milliGRAM(s) Oral every 6 hours PRN Temp greater or equal to 38C (100.4F), Moderate Pain (4 - 6)  ALBUTerol    90 MICROgram(s) HFA Inhaler 1 Puff(s) Inhalation every 6 hours PRN Shortness of Breath and/or Wheezing  dextrose Oral Gel 15 Gram(s) Oral once PRN Blood Glucose LESS THAN 70 milliGRAM(s)/deciliter  labetalol Injectable 10 milliGRAM(s) IV Push every 4 hours PRN If pt refuses PO BP med  melatonin 3 milliGRAM(s) Oral at bedtime PRN Insomnia      Allergies    losartan (Angioedema)    Intolerances    Chicken (Stomach Upset)      LABS:    10-17    143  |  95<L>  |  32<H>  ----------------------------<  142<H>  4.3   |  41<H>  |  1.15    Ca    8.7      17 Oct 2022 05:40  Phos  5.4     10-17  Mg     2.3     10-17    TPro  7.1  /  Alb  2.9<L>  /  TBili  1.1  /  DBili  x   /  AST  16  /  ALT  22  /  AlkPhos  128<H>  10-17    PT/INR - ( 17 Oct 2022 10:35 )   PT: 22.2 sec;   INR: 1.85 ratio                   CAPILLARY BLOOD GLUCOSE      POCT Blood Glucose.: 122 mg/dL (17 Oct 2022 08:16)  POCT Blood Glucose.: 181 mg/dL (16 Oct 2022 22:02)  POCT Blood Glucose.: 160 mg/dL (16 Oct 2022 17:13)  POCT Blood Glucose.: 144 mg/dL (16 Oct 2022 11:34)    pro-bnp -- 10-16 @ 07:57     d-dimer <150  10-16 @ 07:57  pro-bnp -- 10-13 @ 05:41     d-dimer <150  10-13 @ 05:41  pro-bnp 1763 10-10 @ 17:50     d-dimer --  10-10 @ 17:50      RADIOLOGY & ADDITIONAL TESTS:    CXR:    Ct scan chest:    ekg;    echo:

## 2022-10-17 NOTE — DIETITIAN INITIAL EVALUATION ADULT - NSFNSGIIOFT_GEN_A_CORE
Adjusted HCN=087.6 lb   Wt data in EMR reviewed: 242.5 lb 11/15/21; 300.7 lb 7/29/22; 297.6 lb 8/2/22; 240 lb 10/10/22; 288.5 lb 10/11/22, a bit fluctuated, may due to scale/fluid variance, diuretic Rx

## 2022-10-17 NOTE — PROGRESS NOTE ADULT - PROBLEM SELECTOR PLAN 7
- No EKG changes noted  - Continue with telemetry monitoring  - C/w Coreg & Eliquis  - Cardiology-Dr. Denton consulted, appreciated - Noted to be COVID (+) in the ED, denies any active respiratory symptoms or sick contacts  - Desaturating to 87% on RA, baseline 90-95% with a h/o COPD not on home o2  - Started on Remdesivir x 5 days and Decadron x10 days  -  C/w Remdesivir, D4.  Last day 10/15.    - S/p Decadron, 10/14.  Transitioned to Prednisone and tapered   - Unable to obtain ambulatory O2 deconditioned, PT recommended AMY.  However, resting and sitting oxygen saturation obtained an documented.  Pt does not warrant home oxygen at this time.    10/14 S/p supplemental oxygen.  Currently in RA sating 93-95% - Maintain O2 88-92% for COPD.  - CXR noted to have b/l infiltrates with no evidence of consolidation  - 10/14 Repeat CXR-F/u results    - Continue to monitory inflammatory markers(d-dimer, procalcitonin, LDH, ferritin, ESR, CRP) Q3days  - ID-Dr. Denton consulted, appreciated

## 2022-10-17 NOTE — PROGRESS NOTE ADULT - PROBLEM SELECTOR PLAN 4
- Resolved   - Elevated Potassium=6.3 on admission  - No EKG changes noted  - Likely in the setting of CHF exacerbation d/t medication noncompliance  - S/p Hyperkalemia cocktail  - S/p Lokelma   - Continue to monitor Potassium  - CMP-Pt refused AM bloodwork - Corrected Ca=7.3.  Supplemented  - Continue to monitor Ca  - Resolved

## 2022-10-17 NOTE — PROGRESS NOTE ADULT - PROBLEM SELECTOR PLAN 9
- History of Hypertension  - C/w Coreg & Lasix   - Continue to monitor BP - Patient has a history of LLE DVT  - Diagnosed two years ago with A-fib diagnosis  - C/w Eliquis   - S/p US of LLE negative for DVT

## 2022-10-17 NOTE — PROGRESS NOTE ADULT - PROBLEM SELECTOR PLAN 3
- History of COPD, not on home 02, takes trelegy Ellipta and albuterol prn   - Not currently in exacerbation, no wheezing on exam  - CXR noted to have b/l infiltrates with no evidence of consolidation, consistent with pulmonary congestion   - 10/14 Repeat CXR-F/u results    - C/w Singulair and Albuterol prn  - Maintain O2 88-92% for COPD- will obtain ambulating O2 for home oxygen   - Unable to obtain ambulatory O2 deconditioned, PT recommended AMY.  However, resting and sitting oxygen saturation obtained an documented.  Pt does not warrant home oxygen at this time : will re-eval as pt requires PRN Oxygen

## 2022-10-17 NOTE — DIETITIAN INITIAL EVALUATION ADULT - SIGNS/SYMPTOMS
0 to 25% intake at times per flowsheet; Limited intake/wt change history data available at present  HdfP0T=3, finger stick pjtu=588 to 364

## 2022-10-17 NOTE — PROGRESS NOTE ADULT - PROBLEM SELECTOR PLAN 8
- Presented with SOB now requiring supplemental 02, likely due to CHF exacerbation in the setting of medication noncompliance    - History of COPD, not on home 02, takes trelegy Ellipta and albuterol prn   - Not currently in exacerbation, no wheezing on exam  - CXR noted to have b/l infiltrates with no evidence of consolidation, consistent with pulmonary congestion   - 10/14 Repeat CXR-F/u results    - C/w Singulair and Albuterol prn  - 10/14 S/p supplemental oxygen.  Currently in RA sating 93-95% - Maintain O2 88-92% for COPD.  - Unable to obtain ambulatory O2 deconditioned, PT recommended AMY.  However, resting and sitting oxygen saturation obtained an documented.  Pt does not warrant home oxygen at this time. - No EKG changes noted  - Continue with telemetry monitoring  - C/w Coreg & Eliquis  - Cardiology-Dr. Denton consulted, appreciated

## 2022-10-17 NOTE — DIETITIAN INITIAL EVALUATION ADULT - FACTORS AFF FOOD INTAKE
acute on chronic comorbidities including Covid19 infection, AHRF, CHF, COPD/change in mental status/pain/Rastafarian/ethnic/cultural/personal food preferences

## 2022-10-17 NOTE — PROGRESS NOTE ADULT - SUBJECTIVE AND OBJECTIVE BOX
PATIENT SEEN AND EXAMINED ON :- 10/17/22  DATE OF SERVICE:   10/17/22          Interim events noted,Labs ,Radiological studies and Cardiology tests reviewed .       HOSPITAL COURSE: HPI:  89F coming from home, ambulates with RW, with PMHx of COPD (not oh home o2), HFpEF (7/29/22 EF 55-60%, LVH, GIDD, mild pulmonary HTN), Afib on Eliquis, Breast CA, HTN, and with remote h/o LLE DVT sent from PCP office for Hypoxia, with associated SOB and YUAN x2d in the setting of medication noncompliance. According to granddaughter, Macario [phone] the patient has had SOB with YUAN for the past few days, which she attributes to not taking her Lasix for 5 days because patient ran out. Patient denies any associated chest pain or cough with SOB.  Patient also c/o chronic LE swelling, recently worsening. Also stating that she has right hip and right shoulder pain which she attributes to her arthritis. Per granddaughter, patient was admitted to [facility] in August for hypoxia and leg swelling, and treatment for heart failure, and discharged with an increased Lasix dose to 40mg BID. Per granddaughter, she ran out of Lasix after not going to her f/u PCP as the patient did not want want to go d/t clinical improvement. In the ED, patient found to be COVID positive, however denies any recent sick contact, as well as no fevers, chills, congestion and cough. Covid Vacc x3.  (10 Oct 2022 23:20)      INTERIM EVENTS:Patient seen at bedside ,interim events noted.      PMH -reviewed admission note, no change since admission  HEART FAILURE: Acute[ ]Chronic[ ] Systolic[ ] Diastolic[ ] Combined Systolic and Diastolic[ ]  CAD[ ] CABG[ ] PCI[ ]  DEVICES[ ] PPM[ ] ICD[ ] ILR[ ]  ATRIAL FIBRILLATION[ ] Paroxysmal[ ] Permanent[ ] CHADS2-[  ]  JACOBO[ ] CKD1[ ] CKD2[ ] CKD3[ ] CKD4[ ] ESRD[ ]  COPD[ ] HTN[ ]   DM[ ] Type1[ ] Type 2[ ]   CVA[ ] Paresis[ ]    AMBULATION: Assisted[ ] Cane/walker[ ] Independent[ ]    MEDICATIONS  (STANDING):  anastrozole 1 milliGRAM(s) Oral daily  apixaban 5 milliGRAM(s) Oral every 12 hours  artificial  tears Solution 1 Drop(s) Both EYES four times a day  aspirin  chewable 81 milliGRAM(s) Oral daily  carvedilol 6.25 milliGRAM(s) Oral every 12 hours  cholecalciferol 1000 Unit(s) Oral daily  dextrose 5%. 1000 milliLiter(s) (50 mL/Hr) IV Continuous <Continuous>  dextrose 5%. 1000 milliLiter(s) (100 mL/Hr) IV Continuous <Continuous>  dextrose 50% Injectable 25 Gram(s) IV Push once  dextrose 50% Injectable 12.5 Gram(s) IV Push once  dextrose 50% Injectable 25 Gram(s) IV Push once  ertapenem  IVPB 1000 milliGRAM(s) IV Intermittent every 24 hours  ferrous    sulfate 325 milliGRAM(s) Oral daily  furosemide    Tablet 40 milliGRAM(s) Oral two times a day  glucagon  Injectable 1 milliGRAM(s) IntraMuscular once  insulin lispro (ADMELOG) corrective regimen sliding scale   SubCutaneous three times a day before meals  montelukast 10 milliGRAM(s) Oral daily  simvastatin 20 milliGRAM(s) Oral at bedtime    MEDICATIONS  (PRN):  acetaminophen     Tablet .. 650 milliGRAM(s) Oral every 6 hours PRN Temp greater or equal to 38C (100.4F), Moderate Pain (4 - 6)  ALBUTerol    90 MICROgram(s) HFA Inhaler 1 Puff(s) Inhalation every 6 hours PRN Shortness of Breath and/or Wheezing  dextrose Oral Gel 15 Gram(s) Oral once PRN Blood Glucose LESS THAN 70 milliGRAM(s)/deciliter  labetalol Injectable 10 milliGRAM(s) IV Push every 4 hours PRN If pt refuses PO BP med  melatonin 3 milliGRAM(s) Oral at bedtime PRN Insomnia            REVIEW OF SYSTEMS:  Constitutional: [ ] fever, [ ]weight loss,  [ ]fatigue [ ]weight gain  Eyes: [ ] visual changes  Respiratory: [ ]shortness of breath;  [ ] cough, [ ]wheezing, [ ]chills, [ ]hemoptysis  Cardiovascular: [ ] chest pain, [ ]palpitations, [ ]dizziness,  [ ]leg swelling[ ]orthopnea[ ]PND  Gastrointestinal: [ ] abdominal pain, [ ]nausea, [ ]vomiting,  [ ]diarrhea [ ]Constipation [ ]Melena  Genitourinary: [ ] dysuria, [ ] hematuria [ ][place]  Neurologic: [ ] headaches [ ] tremors[ ]weakness [ ]Paralysis Right[ ] Left[ ]  Skin: [ ] itching, [ ]burning, [ ] rashes  Endocrine: [ ] heat or cold intolerance  Musculoskeletal: [ ] joint pain or swelling; [ ] muscle, back, or extremity pain  Psychiatric: [ ] depression, [ ]anxiety, [ ]mood swings, or [ ]difficulty sleeping  Hematologic: [ ] easy bruising, [ ] bleeding gums    [ ] All remaining systems negative except as per above.   [ ]Unable to obtain.  [x] No change in ROS since admission      Vital Signs Last 24 Hrs  T(C): 37.1 (17 Oct 2022 13:09), Max: 37.1 (17 Oct 2022 05:16)  T(F): 98.8 (17 Oct 2022 13:09), Max: 98.8 (17 Oct 2022 05:16)  HR: 67 (17 Oct 2022 13:09) (67 - 94)  BP: 110/87 (17 Oct 2022 13:09) (108/90 - 116/81)  BP(mean): --  RR: 19 (17 Oct 2022 13:09) (17 - 20)  SpO2: 100% (17 Oct 2022 13:09) (97% - 100%)    Parameters below as of 17 Oct 2022 13:09  Patient On (Oxygen Delivery Method): nasal cannula  O2 Flow (L/min): 2    I&O's Summary      PHYSICAL EXAM:  General: No acute distress BMI-  HEENT: EOMI, PERRL  Neck: Supple, [ ] JVD  Lungs: Equal air entry bilaterally; [ ] rales [ ] wheezing [ ] rhonchi  Heart: Regular rate and rhythm; [x ] murmur   2/6 [ x] systolic [ ] diastolic [ ] radiation[ ] rubs [ ]  gallops  Abdomen: Nontender, bowel sounds present  Extremities: No clubbing, cyanosis, [ ] edema [ ]Pulses  equal and intact  Nervous system:  Alert & Oriented X3, no focal deficits  Psychiatric: Normal affect  Skin: No rashes or lesions    LABS:  10-17    143  |  95<L>  |  32<H>  ----------------------------<  142<H>  4.3   |  41<H>  |  1.15    Ca    8.7      17 Oct 2022 05:40  Phos  5.4     10-17  Mg     2.3     10-17    TPro  7.1  /  Alb  2.9<L>  /  TBili  1.1  /  DBili  x   /  AST  16  /  ALT  22  /  AlkPhos  128<H>  10-17    Creatinine Trend: 1.15<--, 1.03<--, 0.84<--, 0.95<--, 1.02<--, 0.93<--    PT/INR - ( 17 Oct 2022 10:35 )   PT: 22.2 sec;   INR: 1.85 ratio

## 2022-10-17 NOTE — PROGRESS NOTE ADULT - PROBLEM SELECTOR PLAN 3
- Elevated ProBNP 1763   - Trop 168, and no EKG changes   - Known history of HFpEF on Coreg 6.25mg BID and Lasix 40mg BID at home, but has not taken Lasix x5d as she ran out   - S/p Lasix 40mg IVP x1.  Lasix IV de-escalated to PO home dose.  C/w Lasix 40mg PO BID.     - C/w Coreg & Lasix   - C/w Strict I&O's.  Daily wt.    - Cardiology-Dr. Denton consulted, appreciated - History of COPD, not on home 02, takes trelegy Ellipta and albuterol prn   - Not currently in exacerbation, no wheezing on exam  - CXR noted to have b/l infiltrates with no evidence of consolidation, consistent with pulmonary congestion   - 10/14 Repeat CXR-F/u results    - C/w Singulair and Albuterol prn  - Maintain O2 88-92% for COPD- will obtain ambulating O2 for home oxygen   - Unable to obtain ambulatory O2 deconditioned, PT recommended AMY.  However, resting and sitting oxygen saturation obtained an documented.  Pt does not warrant home oxygen at this time : will re-eval as pt requires PRN Oxygen

## 2022-10-18 ENCOUNTER — TRANSCRIPTION ENCOUNTER (OUTPATIENT)
Age: 87
End: 2022-10-18

## 2022-10-18 LAB
ALBUMIN SERPL ELPH-MCNC: 2.9 G/DL — LOW (ref 3.5–5)
ALP SERPL-CCNC: 122 U/L — HIGH (ref 40–120)
ALT FLD-CCNC: 19 U/L DA — SIGNIFICANT CHANGE UP (ref 10–60)
ANION GAP SERPL CALC-SCNC: 4 MMOL/L — LOW (ref 5–17)
AST SERPL-CCNC: 17 U/L — SIGNIFICANT CHANGE UP (ref 10–40)
BILIRUB SERPL-MCNC: 1.1 MG/DL — SIGNIFICANT CHANGE UP (ref 0.2–1.2)
BUN SERPL-MCNC: 39 MG/DL — HIGH (ref 7–18)
CALCIUM SERPL-MCNC: 8.6 MG/DL — SIGNIFICANT CHANGE UP (ref 8.4–10.5)
CHLORIDE SERPL-SCNC: 97 MMOL/L — SIGNIFICANT CHANGE UP (ref 96–108)
CO2 SERPL-SCNC: 41 MMOL/L — HIGH (ref 22–31)
CREAT SERPL-MCNC: 1.14 MG/DL — SIGNIFICANT CHANGE UP (ref 0.5–1.3)
EGFR: 46 ML/MIN/1.73M2 — LOW
GLUCOSE BLDC GLUCOMTR-MCNC: 138 MG/DL — HIGH (ref 70–99)
GLUCOSE BLDC GLUCOMTR-MCNC: 156 MG/DL — HIGH (ref 70–99)
GLUCOSE BLDC GLUCOMTR-MCNC: 171 MG/DL — HIGH (ref 70–99)
GLUCOSE BLDC GLUCOMTR-MCNC: 185 MG/DL — HIGH (ref 70–99)
GLUCOSE SERPL-MCNC: 150 MG/DL — HIGH (ref 70–99)
HCT VFR BLD CALC: 48.2 % — HIGH (ref 34.5–45)
HGB BLD-MCNC: 15 G/DL — SIGNIFICANT CHANGE UP (ref 11.5–15.5)
INR BLD: 1.66 RATIO — HIGH (ref 0.88–1.16)
MAGNESIUM SERPL-MCNC: 2.5 MG/DL — SIGNIFICANT CHANGE UP (ref 1.6–2.6)
MCHC RBC-ENTMCNC: 30.4 PG — SIGNIFICANT CHANGE UP (ref 27–34)
MCHC RBC-ENTMCNC: 31.1 GM/DL — LOW (ref 32–36)
MCV RBC AUTO: 97.8 FL — SIGNIFICANT CHANGE UP (ref 80–100)
NRBC # BLD: 0 /100 WBCS — SIGNIFICANT CHANGE UP (ref 0–0)
PHOSPHATE SERPL-MCNC: 3.7 MG/DL — SIGNIFICANT CHANGE UP (ref 2.5–4.5)
PLATELET # BLD AUTO: 245 K/UL — SIGNIFICANT CHANGE UP (ref 150–400)
POTASSIUM SERPL-MCNC: 4.1 MMOL/L — SIGNIFICANT CHANGE UP (ref 3.5–5.3)
POTASSIUM SERPL-SCNC: 4.1 MMOL/L — SIGNIFICANT CHANGE UP (ref 3.5–5.3)
PROT SERPL-MCNC: 6.9 G/DL — SIGNIFICANT CHANGE UP (ref 6–8.3)
PROTHROM AB SERPL-ACNC: 19.8 SEC — HIGH (ref 10.5–13.4)
RBC # BLD: 4.93 M/UL — SIGNIFICANT CHANGE UP (ref 3.8–5.2)
RBC # FLD: 13.6 % — SIGNIFICANT CHANGE UP (ref 10.3–14.5)
SODIUM SERPL-SCNC: 142 MMOL/L — SIGNIFICANT CHANGE UP (ref 135–145)
WBC # BLD: 9.64 K/UL — SIGNIFICANT CHANGE UP (ref 3.8–10.5)
WBC # FLD AUTO: 9.64 K/UL — SIGNIFICANT CHANGE UP (ref 3.8–10.5)

## 2022-10-18 RX ORDER — ERTAPENEM SODIUM 1 G/1
1000 INJECTION, POWDER, LYOPHILIZED, FOR SOLUTION INTRAMUSCULAR; INTRAVENOUS EVERY 24 HOURS
Refills: 0 | Status: DISCONTINUED | OUTPATIENT
Start: 2022-10-19 | End: 2022-10-20

## 2022-10-18 RX ORDER — ERTAPENEM SODIUM 1 G/1
1000 INJECTION, POWDER, LYOPHILIZED, FOR SOLUTION INTRAMUSCULAR; INTRAVENOUS EVERY 24 HOURS
Refills: 0 | Status: DISCONTINUED | OUTPATIENT
Start: 2022-10-18 | End: 2022-10-18

## 2022-10-18 RX ORDER — ACETAMINOPHEN 500 MG
650 TABLET ORAL ONCE
Refills: 0 | Status: COMPLETED | OUTPATIENT
Start: 2022-10-18 | End: 2022-10-18

## 2022-10-18 RX ADMIN — Medication 1 DROP(S): at 11:17

## 2022-10-18 RX ADMIN — ANASTROZOLE 1 MILLIGRAM(S): 1 TABLET ORAL at 11:17

## 2022-10-18 RX ADMIN — Medication 1 DROP(S): at 18:15

## 2022-10-18 RX ADMIN — Medication 1 DROP(S): at 23:39

## 2022-10-18 RX ADMIN — Medication 1000 UNIT(S): at 11:17

## 2022-10-18 RX ADMIN — Medication 1 DROP(S): at 05:51

## 2022-10-18 RX ADMIN — Medication 650 MILLIGRAM(S): at 15:39

## 2022-10-18 RX ADMIN — Medication 40 MILLIGRAM(S): at 14:36

## 2022-10-18 RX ADMIN — SIMVASTATIN 20 MILLIGRAM(S): 20 TABLET, FILM COATED ORAL at 21:23

## 2022-10-18 RX ADMIN — Medication 325 MILLIGRAM(S): at 11:17

## 2022-10-18 RX ADMIN — Medication 10 MILLIGRAM(S): at 05:50

## 2022-10-18 RX ADMIN — Medication 40 MILLIGRAM(S): at 05:50

## 2022-10-18 RX ADMIN — Medication 81 MILLIGRAM(S): at 11:18

## 2022-10-18 RX ADMIN — CARVEDILOL PHOSPHATE 6.25 MILLIGRAM(S): 80 CAPSULE, EXTENDED RELEASE ORAL at 05:51

## 2022-10-18 RX ADMIN — APIXABAN 5 MILLIGRAM(S): 2.5 TABLET, FILM COATED ORAL at 05:50

## 2022-10-18 RX ADMIN — Medication 1: at 08:06

## 2022-10-18 RX ADMIN — Medication 650 MILLIGRAM(S): at 14:36

## 2022-10-18 RX ADMIN — CARVEDILOL PHOSPHATE 6.25 MILLIGRAM(S): 80 CAPSULE, EXTENDED RELEASE ORAL at 18:15

## 2022-10-18 RX ADMIN — APIXABAN 5 MILLIGRAM(S): 2.5 TABLET, FILM COATED ORAL at 18:15

## 2022-10-18 RX ADMIN — MONTELUKAST 10 MILLIGRAM(S): 4 TABLET, CHEWABLE ORAL at 11:17

## 2022-10-18 RX ADMIN — Medication 1: at 11:16

## 2022-10-18 NOTE — PROGRESS NOTE ADULT - PROBLEM SELECTOR PLAN 12
-family will take her back home once she is completed quarantine x 10 days - 10/21   -f/u on home O2 reeval

## 2022-10-18 NOTE — DISCHARGE NOTE PROVIDER - NSDCMRMEDTOKEN_GEN_ALL_CORE_FT
albuterol 90 mcg/inh inhalation aerosol: 1 puff(s) inhaled every 6 hours, As Needed  anastrozole 1 mg oral tablet: 1 tab(s) orally once a day  aspirin 81 mg oral tablet, chewable: 1 tab(s) orally once a day  Coreg 6.25 mg oral tablet: 1 tab(s) orally 2 times a day  Eliquis 5 mg oral tablet: 1 tab(s) orally every 12 hours  FeroSul 325 mg (65 mg elemental iron) oral tablet: 1 tab(s) orally once a day  furosemide 40 mg oral tablet: 1 tab(s) orally 2 times a day  montelukast 10 mg oral tablet: 1 tab(s) orally once a day  ocular lubricant ophthalmic solution: 1 drop(s) to each affected eye 2 times a day  simvastatin 20 mg oral tablet: 1 tab(s) orally once a day (at bedtime)  Vitamin D3 125 mcg (5000 intl units) oral capsule: 1 cap(s) orally once a day   albuterol 90 mcg/inh inhalation aerosol: 1 puff(s) inhaled every 6 hours, As Needed  anastrozole 1 mg oral tablet: 1 tab(s) orally once a day  apixaban 5 mg oral tablet: 1 tab(s) orally every 12 hours  aspirin 81 mg oral tablet, chewable: 1 tab(s) orally once a day  carvedilol 6.25 mg oral tablet: 1 tab(s) orally every 12 hours  FeroSul 325 mg (65 mg elemental iron) oral tablet: 1 tab(s) orally once a day  furosemide 40 mg oral tablet: 1 tab(s) orally 2 times a day  Lidocare Pain Relief Patch 4% topical film: Apply topically to affected area once a day   montelukast 10 mg oral tablet: 1 tab(s) orally once a day  ocular lubricant ophthalmic solution: 1 drop(s) to each affected eye 2 times a day  simvastatin 20 mg oral tablet: 1 tab(s) orally once a day (at bedtime)  Vitamin D3 125 mcg (5000 intl units) oral capsule: 1 cap(s) orally once a day

## 2022-10-18 NOTE — DISCHARGE NOTE PROVIDER - DETAILS OF MALNUTRITION DIAGNOSIS/DIAGNOSES
This patient has been assessed with a concern for Malnutrition and was treated during this hospitalization for the following Nutrition diagnosis/diagnoses:     -  10/17/2022: Morbid obesity (BMI > 40)

## 2022-10-18 NOTE — PROGRESS NOTE ADULT - PROBLEM SELECTOR PLAN 1
- Patient was sent from PCP office for Hypoxia in the setting of SOB on exertion and b/l pitting edema,  - Known history of HFpEF  - Likely secondary to CHF exacerbation in the setting of medication noncompliance   - ProBNP 1763   - Trop 168  - SpO2  84 % on RA in the ED, dyspneic on exertion.  S/p Lasix 40mg IVP x1 and Decadron 6mg IVPx1, and started on Remdesivir in the ED  -completed remdesivir  -completing steroids today    - CXR noted to have b/l infiltrates with no evidence of consolidation-findings consistent with pulmonary congestion  - 10/14 Repeat CXR-Neg for pulm disease  - S/p LLE Doppler negative for DVT   - Lasix IV de-escalated to PO home dose.  C/w Lasix 40mg PO BID.     - C/w Albuterol & Singulair

## 2022-10-18 NOTE — PROGRESS NOTE ADULT - SUBJECTIVE AND OBJECTIVE BOX
Patient is seen and examined at the bed side, is afebrile. She is c/o Right hip pain, the Xray is pending,       REVIEW OF SYSTEMS: Unable to obtain due to mental status      ALLERGIES: Chicken (Stomach Upset)  losartan (Angioedema)      Vital Signs Last 24 Hrs  T(C): 36.4 (18 Oct 2022 18:01), Max: 37.2 (18 Oct 2022 04:36)  T(F): 97.5 (18 Oct 2022 18:01), Max: 98.9 (18 Oct 2022 04:36)  HR: 96 (18 Oct 2022 18:01) (72 - 96)  BP: 115/80 (18 Oct 2022 18:01) (110/81 - 122/81)  BP(mean): --  RR: 18 (18 Oct 2022 18:01) (18 - 18)  SpO2: 94% (18 Oct 2022 18:01) (94% - 100%)    Parameters below as of 18 Oct 2022 18:01  Patient On (Oxygen Delivery Method): room air        PHYSICAL EXAM:  GENERAL: Not in acute distress, off oxygen   CHEST/LUNG: Not using accessory muscles   CNS: Awake and more alert   Please refer to Primary team's note for rest of the systems      LABS:                           15.0   9.64  )-----------( 245      ( 18 Oct 2022 05:35 )             48.2                           14.9   9.00  )-----------( 240      ( 15 Oct 2022 05:36 )             47.9               10-18    142  |  97  |  39<H>  ----------------------------<  150<H>  4.1   |  41<H>  |  1.14    Ca    8.6      18 Oct 2022 05:35  Phos  3.7     10-18  Mg     2.5     10-18    TPro  6.9  /  Alb  2.9<L>  /  TBili  1.1  /  DBili  x   /  AST  17  /  ALT  19  /  AlkPhos  122<H>  10-18    10-15    140  |  92<L>  |  20<H>  ----------------------------<  141<H>  4.1   |  41<H>  |  0.84    Ca    9.1      15 Oct 2022 05:36  Phos  1.8     10-15  Mg     2.0     10-15    TPro  7.7  /  Alb  3.3<L>  /  TBili  1.0  /  DBili  x   /  AST  19  /  ALT  26  /  AlkPhos  136<H>  10-15  PT/INR - ( 11 Oct 2022 05:01 )   PT: 17.5 sec;   INR: 1.46 ratio      PTT - ( 10 Oct 2022 19:00 )  PTT:41.1 sec        CAPILLARY BLOOD GLUCOSE  POCT Blood Glucose.: 164 mg/dL (11 Oct 2022 12:06)  POCT Blood Glucose.: 163 mg/dL (11 Oct 2022 08:27)  POCT Blood Glucose.: 142 mg/dL (11 Oct 2022 02:15)        Urinalysis Basic - ( 11 Oct 2022 06:05 )  Color: Yellow / Appearance: Clear / S.005 / pH: x  Gluc: x / Ketone: Negative  / Bili: Negative / Urobili: Negative   Blood: x / Protein: Negative / Nitrite: Negative   Leuk Esterase: Negative / RBC: 0-2 /HPF / WBC 3-5 /HPF   Sq Epi: x / Non Sq Epi: Few /HPF / Bacteria: Trace /HPF        MEDICATIONS  (STANDING):    anastrozole 1 milliGRAM(s) Oral daily  apixaban 5 milliGRAM(s) Oral every 12 hours  artificial  tears Solution 1 Drop(s) Both EYES four times a day  aspirin  chewable 81 milliGRAM(s) Oral daily  carvedilol 6.25 milliGRAM(s) Oral every 12 hours  cholecalciferol 1000 Unit(s) Oral daily  ferrous    sulfate 325 milliGRAM(s) Oral daily  furosemide    Tablet 40 milliGRAM(s) Oral two times a day  glucagon  Injectable 1 milliGRAM(s) IntraMuscular once  insulin lispro (ADMELOG) corrective regimen sliding scale   SubCutaneous three times a day before meals  montelukast 10 milliGRAM(s) Oral daily  simvastatin 20 milliGRAM(s) Oral at bedtime      RADIOLOGY & ADDITIONAL TESTS:    10/10/22 : Xray Chest 1 View AP/PA (10.10.22 @ 18:43) IMPRESSION:   No radiographic evidence of active chest disease.      10/10/22 : US Duplex Venous Lower Ext Ltd, Left (10.10.22 @ 18:42) Limited study with limited compression secondary to swelling.  No obvious left lower extremity deep venous thrombosis.      MICROBIOLOGY DATA:    Culture - Urine (10.11.22 @ 06:05)   - Amikacin: S <=16   - Amoxicillin/Clavulanic Acid: S <=8/4   - Ampicillin: R >16 These ampicillin results predict results for amoxicillin   - Ampicillin/Sulbactam: I 16/8 Enterobacter, Klebsiella aerogenes, Citrobacter, and Serratia may develop resistance during prolonged therapy (3-4 days)   - Aztreonam: R >16   - Cefazolin: R >16 For uncomplicated UTI with K. pneumoniae, E. coli, or P. mirablis: NIDIA <=16 is sensitive and NIDIA >=32 is resistant. This also predicts results for oral agents cefaclor, cefdinir, cefpodoxime, cefprozil, cefuroxime axetil, cephalexin and locarbef for uncomplicated UTI. Note that some isolates may be susceptible to these agents while testing resistant to cefazolin.   - Cefepime: R >16   - Ceftriaxone: R >32 Enterobacter, Klebsiella aerogenes, Citrobacter, and Serratia may develop resistance during prolonged therapy   - Ciprofloxacin: S <=0.25   - Ertapenem: S <=0.5   - Gentamicin: S <=2   - Imipenem: S <=1   - Levofloxacin: R 2   - Meropenem: S <=1   - Nitrofurantoin: I 64 Should not be used to treat pyelonephritis   - Piperacillin/Tazobactam: S <=8   - Trimethoprim/Sulfamethoxazole: R >2/38   - Tigecycline: S <=2   - Tobramycin: S <=2   Specimen Source: Clean Catch Clean Catch (Midstream)   Culture Results:   50,000 - 99,000 CFU/mL Klebsiella pneumoniae ESBL   10,000 - 49,000 CFU/mL Aerococcus species   "Aerococcus spp. are predictably susceptible to penicillin,   ampicillin, tetracycline, and vancomycin. All isolates are   resistant to sulfonamides"   Organism Identification: Klebsiella pneumoniae ESBL   Culture - Urine (10.11.22 @ 06:05)   Specimen Source: Clean Catch Clean Catch (Midstream)   Culture Results: 50,000 - 99,000 CFU/mL Klebsiella pneumoniae   10,000 - 49,000 CFU/mL Aerococcus species   "Aerococcus spp. are predictably susceptible to penicillin,   ampicillin, tetracycline, and vancomycin. All isolates are   resistant to sulfonamides"     Urine Microscopic-Add On (NC) (10.11.22 @ 06:05)   Epithelial Cells: Few /HPF   Red Blood Cell - Urine: 0-2 /HPF   White Blood Cell - Urine: 3-5 /HPF   Bacteria: Trace /HPF     Flu With COVID-19 By PATRICK (10.10.22 @ 17:50)   SARS-CoV-2 Result: Detected:

## 2022-10-18 NOTE — PROGRESS NOTE ADULT - NS ATTEND AMEND GEN_ALL_CORE FT
pt was seen and examined  iv lasix  repeat cxray in am
pt was seen and examined  agreed with above  dw np
pt was seen and examined  iv lasix  cardiology dr terry
pt was seen and examined  agreed with above

## 2022-10-18 NOTE — PROGRESS NOTE ADULT - PROBLEM SELECTOR PLAN 12
- family will take her back home once she is completed quarantine x 10 days - 10/21   -f/u on home O2 reeval -family will take her back home once she is completed quarantine x 10 days - 10/21   -f/u on home O2 reeval

## 2022-10-18 NOTE — PROGRESS NOTE ADULT - PROBLEM SELECTOR PLAN 11
- discussed with granddaughter Mirela at length - full code, encouraged her to have family discussion as she is declining and have comorbidity her home number : [phone]  [phone]

## 2022-10-18 NOTE — PROGRESS NOTE ADULT - PROBLEM SELECTOR PLAN 11
- discussed with granddaughter Mirela at length - full code, encouraged her to have family discussion as she is declining and have comorbidity her home number : [phone]  [phone]

## 2022-10-18 NOTE — PROGRESS NOTE ADULT - ASSESSMENT
Patient is a 89y  old female  coming from home, with PMHx of COPD (not oh home o2), HFpEF (7/29/22 EF 55-60%, LVH, GIDD, mild pulmonary HTN), Afib on Eliquis, Breast CA, HTN, and with remote h/o LLE DVT sent from PCP office for evaluation of Hypoxia, with associated SOB and YUAN x2d in the setting of medication noncompliance.  Patient has not taking her Lasix for last 5 days because she ran out  of Lasix. Per granddaughter, patient was admitted to [facility] in August for hypoxia and leg swelling, and treatment for heart failure, and discharged with an increased Lasix dose to 40mg BID.  On admission, she found to have no fever, but tachypnea, hypoxia to 84 % in Room air and positive COVID PCR. She has started on Remdesivir and Dexamethasone, and the ID consult requested to assist with further evaluation and antibiotic management.    # COVID Pneumonitis  # Acute Hypoxic Respiratory failure- on oxygen via NC  # The urine culture grew Klebsiella and Aerococcus in the setting of only 3 to 5 WBC in Urine analysis-  will benefit form treating ESBL  Klebsiella since became confused , sometimes mild component of UTI can worsens the confusion  # COVID VS Metabolic encephalitis - may benefit from treating the mild positive UA    would recommend:    1. Follow up Righ thip Xray  2. Continue Ertapenem until 10/20/22  3. Supportive care including AC  4. Aspiration and COVID precautions    d/w Covering Jj ROD    Attending Attestation:    Spent more than 35 minutes on total encounter, more than 50 % of the visit was spent counseling and/or coordinating care by the Attending physician.

## 2022-10-18 NOTE — PROGRESS NOTE ADULT - PROBLEM SELECTOR PLAN 7
- Noted to be COVID (+) in the ED, denies any active respiratory symptoms or sick contacts  - Desaturating to 87% on RA, baseline 90-95% with a h/o COPD not on home o2  - Started on Remdesivir x 5 days and Decadron x10 days  -  C/w Remdesivir, D4.  Last day 10/15.    - S/p Decadron, 10/14.  Transitioned to Prednisone and tapered   - Unable to obtain ambulatory O2 deconditioned, PT recommended AMY.  However, resting and sitting oxygen saturation obtained an documented.  Pt does not warrant home oxygen at this time.    10/14 S/p supplemental oxygen.  Currently in RA sating 93-95% - Maintain O2 88-92% for COPD.  - CXR noted to have b/l infiltrates with no evidence of consolidation  - 10/14 Repeat CXR- Neg for pulm disease     - Continue to monitory inflammatory markers(d-dimer, procalcitonin, LDH, ferritin, ESR, CRP) Q3days  - ID-f/u

## 2022-10-18 NOTE — PROGRESS NOTE ADULT - ASSESSMENT
89 year old Female, from home, ambulates with RW, with PMH of COPD (not on home oxygen), HFpEF (7/29/22 EF 55-60%, LVH, GIDD, mild pulmonary HTN), Afib on Eliquis, Breast CA, HTN, and remote h/o LLE DVT sent from PCP office for Hypoxia, with associated SOB and YUAN x2d.  Admitted for AHRF likely 2/2 CHF exacerbation in the setting of medication noncompliance & COVID infection.   Seen and examined pt at bedside, she does not want to be bothered today but lungs sound was clear and maintains good O2 saturation on supplemental O2 with 2l/min. Pt did not have good appetite over the weekend but today, she ate good in the morning and afternoon. Spoke with granddaughter Ms Dietz via phone and had a discussion at length including GOC , full code at this time, encouraged her to have a family discussion as she has Acute on chronic HF and COPD with COVID infection which can affect on her respiratory. She willing to take her back home not sending her to [facility] as isolation makes her depress so family want to take her home after 10 days of Quarantine. Pt t is on supplemental oxygen via [place] 2L - will obtain off oxygen saturation to see if she qualifies for home O2.  10/18- Pt evaluated at bedside, denies all medical complaints at time of eval, breathing comfortably and speaking in full sentences.  CXR reviewed and  discussed w/ radiologist.  Showed cardiomegaly w/o active pulm disease.  Will wean pt of O2 and OOB to chair.

## 2022-10-18 NOTE — PROGRESS NOTE ADULT - PROBLEM SELECTOR PLAN 6
- Likely in the setting of CHF exacerbation in the setting of medication noncompliance   - At baseline patient ambulates with rolling walker  - Reports chronic LE swelling  - S/p LLE doppler negative DVT  - C/o chronic right hip pain, denies fall or trauma   - PT recommended AMY but family will take pt home.  Has a CDPAP  - reeval for home O2   - will set up home PT on discharge - Likely in the setting of CHF exacerbation in the setting of medication noncompliance   - At baseline patient ambulates with rolling walker  - Reports chronic LE swelling  - S/p LLE doppler negative DVT  - C/o chronic right hip pain, denies fall or trauma   -F/u ordered hip x-ray to r/o occult fx since hx of breast CA  - PT recommended AMY but family will take pt home.  Has a CDPAP  - reeval for home O2   - will set up home PT on discharge

## 2022-10-18 NOTE — PROGRESS NOTE ADULT - PROBLEM SELECTOR PLAN 7
- Noted to be COVID (+) in the ED, denies any active respiratory symptoms or sick contacts  - Desaturating to 87% on RA, baseline 90-95% with a h/o COPD not on home o2  - Started on Remdesivir x 5 days and Decadron x10 days  -  C/w Remdesivir, D4.  Last day 10/15.    - S/p Decadron, 10/14.  Transitioned to Prednisone and tapered   - Unable to obtain ambulatory O2 deconditioned, PT recommended AMY.  However, resting and sitting oxygen saturation obtained an documented.  Pt does not warrant home oxygen at this time.    10/14 S/p supplemental oxygen.  Currently in RA sating 93-95% - Maintain O2 88-92% for COPD.  - CXR noted to have b/l infiltrates with no evidence of consolidation  - 10/14 Repeat CXR-F/u results    - Continue to monitory inflammatory markers(d-dimer, procalcitonin, LDH, ferritin, ESR, CRP) Q3days  - ID-Dr. Denton consulted, appreciated - Noted to be COVID (+) in the ED, denies any active respiratory symptoms or sick contacts  - Desaturating to 87% on RA, baseline 90-95% with a h/o COPD not on home o2  - Started on Remdesivir x 5 days and Decadron x10 days  -  C/w Remdesivir, D4.  Last day 10/15.    - S/p Decadron, 10/14.  Transitioned to Prednisone and tapered   - Unable to obtain ambulatory O2 deconditioned, PT recommended AMY.  However, resting and sitting oxygen saturation obtained an documented.  Pt does not warrant home oxygen at this time.    10/14 S/p supplemental oxygen.  Currently in RA sating 93-95% - Maintain O2 88-92% for COPD.  - CXR noted to have b/l infiltrates with no evidence of consolidation  - 10/14 Repeat CXR- Neg for pulm disease     - Continue to monitory inflammatory markers(d-dimer, procalcitonin, LDH, ferritin, ESR, CRP) Q3days  - ID-Dr. Denton consulted, appreciated

## 2022-10-18 NOTE — DISCHARGE NOTE PROVIDER - CARE PROVIDER_API CALL
Ayden Bianchi)  [facility]  [address] [place] [address], [place] [address]  Phone: [phone]  Fax: [phone]  Follow Up Time:

## 2022-10-18 NOTE — PROGRESS NOTE ADULT - SUBJECTIVE AND OBJECTIVE BOX
NP Note discussed with  Primary Attending    Patient is a 89y old  Female who presents with a chief complaint of SOB (18 Oct 2022 09:30)      INTERVAL HPI/OVERNIGHT EVENTS: no new complaints- Resting in bed and breathing comfortably.      MEDICATIONS  (STANDING):  anastrozole 1 milliGRAM(s) Oral daily  apixaban 5 milliGRAM(s) Oral every 12 hours  artificial  tears Solution 1 Drop(s) Both EYES four times a day  aspirin  chewable 81 milliGRAM(s) Oral daily  carvedilol 6.25 milliGRAM(s) Oral every 12 hours  cholecalciferol 1000 Unit(s) Oral daily  dextrose 5%. 1000 milliLiter(s) (50 mL/Hr) IV Continuous <Continuous>  dextrose 5%. 1000 milliLiter(s) (100 mL/Hr) IV Continuous <Continuous>  dextrose 50% Injectable 25 Gram(s) IV Push once  dextrose 50% Injectable 12.5 Gram(s) IV Push once  dextrose 50% Injectable 25 Gram(s) IV Push once  ertapenem  IVPB 1000 milliGRAM(s) IV Intermittent every 24 hours  ferrous    sulfate 325 milliGRAM(s) Oral daily  furosemide    Tablet 40 milliGRAM(s) Oral two times a day  glucagon  Injectable 1 milliGRAM(s) IntraMuscular once  insulin lispro (ADMELOG) corrective regimen sliding scale   SubCutaneous three times a day before meals  montelukast 10 milliGRAM(s) Oral daily  simvastatin 20 milliGRAM(s) Oral at bedtime    MEDICATIONS  (PRN):  acetaminophen     Tablet .. 650 milliGRAM(s) Oral every 6 hours PRN Temp greater or equal to 38C (100.4F), Moderate Pain (4 - 6)  ALBUTerol    90 MICROgram(s) HFA Inhaler 1 Puff(s) Inhalation every 6 hours PRN Shortness of Breath and/or Wheezing  dextrose Oral Gel 15 Gram(s) Oral once PRN Blood Glucose LESS THAN 70 milliGRAM(s)/deciliter  labetalol Injectable 10 milliGRAM(s) IV Push every 4 hours PRN If pt refuses PO BP med  melatonin 3 milliGRAM(s) Oral at bedtime PRN Insomnia      __________________________________________________  REVIEW OF SYSTEMS:    CONSTITUTIONAL: No fever,   EYES: no acute visual disturbances  NECK: No pain or stiffness  RESPIRATORY: No cough; No shortness of breath  CARDIOVASCULAR: No chest pain, no palpitations  GASTROINTESTINAL: No pain. No nausea or vomiting; No diarrhea   NEUROLOGICAL: No headache or numbness, no tremors  MUSCULOSKELETAL: No joint pain, no muscle pain  GENITOURINARY: no dysuria, no frequency, no hesitancy  PSYCHIATRY: no depression , no anxiety  ALL OTHER  ROS negative        Vital Signs Last 24 Hrs  T(C): 37.2 (18 Oct 2022 04:36), Max: 37.2 (18 Oct 2022 04:36)  T(F): 98.9 (18 Oct 2022 04:36), Max: 98.9 (18 Oct 2022 04:36)  HR: 86 (18 Oct 2022 04:36) (67 - 86)  BP: 118/78 (18 Oct 2022 04:36) (110/81 - 118/78)  BP(mean): --  RR: 18 (18 Oct 2022 04:36) (18 - 19)  SpO2: 100% (18 Oct 2022 04:36) (97% - 100%)    Parameters below as of 18 Oct 2022 04:36  Patient On (Oxygen Delivery Method): nasal cannula  O2 Flow (L/min): 2      ________________________________________________  PHYSICAL EXAM:  GENERAL: NAD obese female resting comfortably   HEENT: Normocephalic;  conjunctivae and sclerae clear; moist mucous membranes;   NECK : supple  CHEST/LUNG: Clear to auscultation bilaterally with good air entry, 2 L via NC  HEART: S1 S2  regular; no murmurs, gallops or rubs  ABDOMEN: Soft, Nontender, Nondistended; Bowel sounds present  EXTREMITIES: no cyanosis; no edema; no calf tenderness  SKIN: warm and dry; no rash  NERVOUS SYSTEM:  Awake and alert; Oriented x two at time of eval, moving all extremities equally; no new deficits    _________________________________________________  LABS:                        15.0   9.64  )-----------( 245      ( 18 Oct 2022 05:35 )             48.2     10-18    142  |  97  |  39<H>  ----------------------------<  150<H>  4.1   |  41<H>  |  1.14    Ca    8.6      18 Oct 2022 05:35  Phos  3.7     10-18  Mg     2.5     10-18    TPro  6.9  /  Alb  2.9<L>  /  TBili  1.1  /  DBili  x   /  AST  17  /  ALT  19  /  AlkPhos  122<H>  10-18    PT/INR - ( 18 Oct 2022 06:07 )   PT: 19.8 sec;   INR: 1.66 ratio             CAPILLARY BLOOD GLUCOSE      POCT Blood Glucose.: 156 mg/dL (18 Oct 2022 08:00)  POCT Blood Glucose.: 172 mg/dL (17 Oct 2022 21:22)  POCT Blood Glucose.: 133 mg/dL (17 Oct 2022 17:10)  POCT Blood Glucose.: 131 mg/dL (17 Oct 2022 12:11)        RADIOLOGY & ADDITIONAL TESTS:    Imaging  Reviewed:  YES/NO    Consultant(s) Notes Reviewed:   YES/ No      Plan of care was discussed with patient and /or primary care giver; all questions and concerns were addressed  NP Note discussed with Attending    Patient is a 89y old Female who presents with a chief complaint of SOB (18 Oct 2022 09:30)      INTERVAL HPI/OVERNIGHT EVENTS: no new complaints- Resting in bed and breathing comfortably.      MEDICATIONS  (STANDING):  anastrozole 1 milliGRAM(s) Oral daily  apixaban 5 milliGRAM(s) Oral every 12 hours  artificial  tears Solution 1 Drop(s) Both EYES four times a day  aspirin  chewable 81 milliGRAM(s) Oral daily  carvedilol 6.25 milliGRAM(s) Oral every 12 hours  cholecalciferol 1000 Unit(s) Oral daily  dextrose 5%. 1000 milliLiter(s) (50 mL/Hr) IV Continuous <Continuous>  dextrose 5%. 1000 milliLiter(s) (100 mL/Hr) IV Continuous <Continuous>  dextrose 50% Injectable 25 Gram(s) IV Push once  dextrose 50% Injectable 12.5 Gram(s) IV Push once  dextrose 50% Injectable 25 Gram(s) IV Push once  ertapenem  IVPB 1000 milliGRAM(s) IV Intermittent every 24 hours  ferrous    sulfate 325 milliGRAM(s) Oral daily  furosemide    Tablet 40 milliGRAM(s) Oral two times a day  glucagon  Injectable 1 milliGRAM(s) IntraMuscular once  insulin lispro (ADMELOG) corrective regimen sliding scale   SubCutaneous three times a day before meals  montelukast 10 milliGRAM(s) Oral daily  simvastatin 20 milliGRAM(s) Oral at bedtime    MEDICATIONS  (PRN):  acetaminophen     Tablet .. 650 milliGRAM(s) Oral every 6 hours PRN Temp greater or equal to 38C (100.4F), Moderate Pain (4 - 6)  ALBUTerol    90 MICROgram(s) HFA Inhaler 1 Puff(s) Inhalation every 6 hours PRN Shortness of Breath and/or Wheezing  dextrose Oral Gel 15 Gram(s) Oral once PRN Blood Glucose LESS THAN 70 milliGRAM(s)/deciliter  labetalol Injectable 10 milliGRAM(s) IV Push every 4 hours PRN If pt refuses PO BP med  melatonin 3 milliGRAM(s) Oral at bedtime PRN Insomnia      __________________________________________________  REVIEW OF SYSTEMS:    CONSTITUTIONAL: No fever,   EYES: no acute visual disturbances  NECK: No pain or stiffness  RESPIRATORY: No cough; No shortness of breath  CARDIOVASCULAR: No chest pain, no palpitations  GASTROINTESTINAL: No pain. No nausea or vomiting; No diarrhea   NEUROLOGICAL: No headache or numbness, no tremors  MUSCULOSKELETAL: No joint pain, no muscle pain  GENITOURINARY: no dysuria, no frequency, no hesitancy  PSYCHIATRY: no depression , no anxiety  ALL OTHER  ROS negative        Vital Signs Last 24 Hrs  T(C): 37.2 (18 Oct 2022 04:36), Max: 37.2 (18 Oct 2022 04:36)  T(F): 98.9 (18 Oct 2022 04:36), Max: 98.9 (18 Oct 2022 04:36)  HR: 86 (18 Oct 2022 04:36) (67 - 86)  BP: 118/78 (18 Oct 2022 04:36) (110/81 - 118/78)  BP(mean): --  RR: 18 (18 Oct 2022 04:36) (18 - 19)  SpO2: 100% (18 Oct 2022 04:36) (97% - 100%)    Parameters below as of 18 Oct 2022 04:36  Patient On (Oxygen Delivery Method): nasal cannula  O2 Flow (L/min): 2      ________________________________________________  PHYSICAL EXAM:  GENERAL: NAD obese female resting comfortably   HEENT: Normocephalic;  conjunctivae and sclerae clear; moist mucous membranes;   NECK : supple  CHEST/LUNG: Clear to auscultation bilaterally with good air entry, 2 L via NC  HEART: S1 S2  regular; no murmurs, gallops or rubs  ABDOMEN: Soft, Nontender, Nondistended; Bowel sounds present  EXTREMITIES: no cyanosis; no edema; no calf tenderness  SKIN: warm and dry; no rash  NERVOUS SYSTEM:  Awake and alert; Oriented x two at time of eval, moving all extremities equally; no new deficits    _________________________________________________  LABS:                        15.0   9.64  )-----------( 245      ( 18 Oct 2022 05:35 )             48.2     10-18    142  |  97  |  39<H>  ----------------------------<  150<H>  4.1   |  41<H>  |  1.14    Ca    8.6      18 Oct 2022 05:35  Phos  3.7     10-18  Mg     2.5     10-18    TPro  6.9  /  Alb  2.9<L>  /  TBili  1.1  /  DBili  x   /  AST  17  /  ALT  19  /  AlkPhos  122<H>  10-18    PT/INR - ( 18 Oct 2022 06:07 )   PT: 19.8 sec;   INR: 1.66 ratio             CAPILLARY BLOOD GLUCOSE      POCT Blood Glucose.: 156 mg/dL (18 Oct 2022 08:00)  POCT Blood Glucose.: 172 mg/dL (17 Oct 2022 21:22)  POCT Blood Glucose.: 133 mg/dL (17 Oct 2022 17:10)  POCT Blood Glucose.: 131 mg/dL (17 Oct 2022 12:11)        RADIOLOGY & ADDITIONAL TESTS:    Imaging  Reviewed:  YES/NO    Consultant(s) Notes Reviewed:   YES/ No      Plan of care was discussed with patient and /or primary care giver; all questions and concerns were addressed

## 2022-10-18 NOTE — DISCHARGE NOTE PROVIDER - NSDCCPCAREPLAN_GEN_ALL_CORE_FT
PRINCIPAL DISCHARGE DIAGNOSIS  Diagnosis: COVID-19  Assessment and Plan of Treatment: For up to date information information on COVID -19, you may contact the Department of Health Hotline at [phone].   Perform frequent good hand hygiene by washing your hands with soap and hot water as can tolerate but not to burn your hands.  Disinfect your house doorknobs, steering wheels, refrigerator door handles.  If your symptoms worsen, fever, chills, chest pain or sob, call your doctor.  Call 911 for severe shortness of breath or get to your nearest Emergency Department and inform the staff or 911 that you were tested positive for COVID-19.  Continue to self quarantine and wear a mask so as to not infect others.  Drink lots of fluids..        SECONDARY DISCHARGE DIAGNOSES  Diagnosis: Afib  Assessment and Plan of Treatment: Atrial fibrillation is the most common heart rhythm abnormality and increases your risk of stroke and heart attack.    It helps if you control your blood pressure, do not drink more than 1-2 alcohol drinks per day, cut down on caffeine, getting treatment for over active thyroid gland and getting exercise.  Call your doctor if you feel your heart racing or beating unusually, chest tightness or pain, lightheaded or dizziness, faint, shortness of breath.   It is important to take your heart medication as prescribed  You may be on anticoagulation which is very important to take as directed - you may need blood work to monitor drug levels for therapeutic range.      Diagnosis: Pulmonary hypertension  Assessment and Plan of Treatment: Continue taking your medication as prescribed to help prevent or minimize your risk of end organ damage.  Follow up with your doctor for routine blood pressure evaluation and medication regimen.  Report any symptoms of headaces with nausea or vomiting, visual changes, heart palpitation, chest pain or shortness.      Diagnosis: Shortness of breath  Assessment and Plan of Treatment: Continue taking your medication as prescribed and report any changes in your condition such increaseing shortness of breath.    Diagnosis: Acute on chronic diastolic congestive heart failure  Assessment and Plan of Treatment: Weigh yourself daily.  If you gain 3lbs in 3 days, or 5lbs in a week call your Health Care Provider.  Do not eat or drink foods containing more than 2000mg of salt (sodium) in your diet every day.  Call your Health Care Provider if you have any swelling or increased swelling in your feet, ankles, and/or stomach.  Take all of your medication as directed.  If you become dizzy call your Health Care Provider.       PRINCIPAL DISCHARGE DIAGNOSIS  Diagnosis: COVID-19  Assessment and Plan of Treatment: For up to date information information on COVID -19, you may contact the Department of Health Hotline at [phone].   Perform frequent good hand hygiene by washing your hands with soap and hot water as can tolerate but not to burn your hands.  Disinfect your house doorknobs, steering wheels, refrigerator door handles.  If your symptoms worsen, fever, chills, chest pain or sob, call your doctor.  Call 911 for severe shortness of breath or get to your nearest Emergency Department and inform the staff or 911 that you were tested positive for COVID-19.  Continue to self quarantine and wear a mask so as to not infect others.  Drink lots of fluids..        SECONDARY DISCHARGE DIAGNOSES  Diagnosis: Shortness of breath  Assessment and Plan of Treatment: Continue taking your medication as prescribed and report any changes in your condition such increaseing shortness of breath.    Diagnosis: Afib  Assessment and Plan of Treatment: Atrial fibrillation is the most common heart rhythm abnormality and increases your risk of stroke and heart attack.    It helps if you control your blood pressure, do not drink more than 1-2 alcohol drinks per day, cut down on caffeine, getting treatment for over active thyroid gland and getting exercise.  Call your doctor if you feel your heart racing or beating unusually, chest tightness or pain, lightheaded or dizziness, faint, shortness of breath.   It is important to take your heart medication as prescribed  You may be on anticoagulation which is very important to take as directed - you may need blood work to monitor drug levels for therapeutic range.      Diagnosis: Acute on chronic diastolic congestive heart failure  Assessment and Plan of Treatment: Weigh yourself daily.  If you gain 3lbs in 3 days, or 5lbs in a week call your Health Care Provider.  Do not eat or drink foods containing more than 2000mg of salt (sodium) in your diet every day.  Call your Health Care Provider if you have any swelling or increased swelling in your feet, ankles, and/or stomach.  Take all of your medication as directed.  If you become dizzy call your Health Care Provider.      Diagnosis: Pulmonary hypertension  Assessment and Plan of Treatment: Continue taking your medication as prescribed to help prevent or minimize your risk of end organ damage.  Follow up with your doctor for routine blood pressure evaluation and medication regimen.  Report any symptoms of headaces with nausea or vomiting, visual changes, heart palpitation, chest pain or shortness.      Diagnosis: Chronic osteoarthritis  Assessment and Plan of Treatment: pain to right hip. Xray of right hip shows Severe hypertrophic/degenerative OA, unchanged from previous images. you were treated with Tylenol and Lidoderm patch to area.  please continue to take tylenol as needed ( no more than 4g in a day) and you may continue to use the lidoderm patch to the area, apply one in the morning and remove 12 hours later.   physical therapy may help you adapt to changes caused by your disease. A physical therapist teaches you exercises to help improve movement and strength and to decrease pain. An occupational therapist teaches you skills to help with your daily activities.

## 2022-10-18 NOTE — PROGRESS NOTE ADULT - PROBLEM SELECTOR PLAN 2
- see plans as above   - C/w Coreg & Lasix   - C/w Strict I&O's.  Daily wt.    - Cardiology-Dr. Denton consulted, appreciated

## 2022-10-18 NOTE — PROGRESS NOTE ADULT - SUBJECTIVE AND OBJECTIVE BOX
PATIENT SEEN AND EXAMINED ON :- 10/18/22  DATE OF SERVICE:   10/18/22          Interim events noted,Labs ,Radiological studies and Cardiology tests reviewed .       HOSPITAL COURSE: HPI:  89F coming from home, ambulates with RW, with PMHx of COPD (not oh home o2), HFpEF (7/29/22 EF 55-60%, LVH, GIDD, mild pulmonary HTN), Afib on Eliquis, Breast CA, HTN, and with remote h/o LLE DVT sent from PCP office for Hypoxia, with associated SOB and YUAN x2d in the setting of medication noncompliance. According to granddaughter, Macario [phone] the patient has had SOB with YUAN for the past few days, which she attributes to not taking her Lasix for 5 days because patient ran out. Patient denies any associated chest pain or cough with SOB.  Patient also c/o chronic LE swelling, recently worsening. Also stating that she has right hip and right shoulder pain which she attributes to her arthritis. Per granddaughter, patient was admitted to [facility] in August for hypoxia and leg swelling, and treatment for heart failure, and discharged with an increased Lasix dose to 40mg BID. Per granddaughter, she ran out of Lasix after not going to her f/u PCP as the patient did not want want to go d/t clinical improvement. In the ED, patient found to be COVID positive, however denies any recent sick contact, as well as no fevers, chills, congestion and cough. Covid Vacc x3.  (10 Oct 2022 23:20)      INTERIM EVENTS:Patient seen at bedside ,interim events noted.      PMH -reviewed admission note, no change since admission  HEART FAILURE: Acute[ ]Chronic[ ] Systolic[ ] Diastolic[ ] Combined Systolic and Diastolic[ ]  CAD[ ] CABG[ ] PCI[ ]  DEVICES[ ] PPM[ ] ICD[ ] ILR[ ]  ATRIAL FIBRILLATION[ ] Paroxysmal[ ] Permanent[ ] CHADS2-[  ]  JACOBO[ ] CKD1[ ] CKD2[ ] CKD3[ ] CKD4[ ] ESRD[ ]  COPD[ ] HTN[ ]   DM[ ] Type1[ ] Type 2[ ]   CVA[ ] Paresis[ ]    AMBULATION: Assisted[ ] Cane/walker[ ] Independent[ ]    MEDICATIONS  (STANDING):  anastrozole 1 milliGRAM(s) Oral daily  apixaban 5 milliGRAM(s) Oral every 12 hours  artificial  tears Solution 1 Drop(s) Both EYES four times a day  aspirin  chewable 81 milliGRAM(s) Oral daily  carvedilol 6.25 milliGRAM(s) Oral every 12 hours  cholecalciferol 1000 Unit(s) Oral daily  dextrose 5%. 1000 milliLiter(s) (50 mL/Hr) IV Continuous <Continuous>  dextrose 5%. 1000 milliLiter(s) (100 mL/Hr) IV Continuous <Continuous>  dextrose 50% Injectable 12.5 Gram(s) IV Push once  dextrose 50% Injectable 25 Gram(s) IV Push once  dextrose 50% Injectable 25 Gram(s) IV Push once  ferrous    sulfate 325 milliGRAM(s) Oral daily  furosemide    Tablet 40 milliGRAM(s) Oral two times a day  glucagon  Injectable 1 milliGRAM(s) IntraMuscular once  insulin lispro (ADMELOG) corrective regimen sliding scale   SubCutaneous three times a day before meals  montelukast 10 milliGRAM(s) Oral daily  simvastatin 20 milliGRAM(s) Oral at bedtime    MEDICATIONS  (PRN):  acetaminophen     Tablet .. 650 milliGRAM(s) Oral every 6 hours PRN Temp greater or equal to 38C (100.4F), Moderate Pain (4 - 6)  ALBUTerol    90 MICROgram(s) HFA Inhaler 1 Puff(s) Inhalation every 6 hours PRN Shortness of Breath and/or Wheezing  dextrose Oral Gel 15 Gram(s) Oral once PRN Blood Glucose LESS THAN 70 milliGRAM(s)/deciliter  labetalol Injectable 10 milliGRAM(s) IV Push every 4 hours PRN If pt refuses PO BP med  melatonin 3 milliGRAM(s) Oral at bedtime PRN Insomnia            REVIEW OF SYSTEMS:  Constitutional: [ ] fever, [ ]weight loss,  [ ]fatigue [ ]weight gain  Eyes: [ ] visual changes  Respiratory: [ ]shortness of breath;  [ ] cough, [ ]wheezing, [ ]chills, [ ]hemoptysis  Cardiovascular: [ ] chest pain, [ ]palpitations, [ ]dizziness,  [ ]leg swelling[ ]orthopnea[ ]PND  Gastrointestinal: [ ] abdominal pain, [ ]nausea, [ ]vomiting,  [ ]diarrhea [ ]Constipation [ ]Melena  Genitourinary: [ ] dysuria, [ ] hematuria [ ]Hawley  Neurologic: [ ] headaches [ ] tremors[ ]weakness [ ]Paralysis Right[ ] Left[ ]  Skin: [ ] itching, [ ]burning, [ ] rashes  Endocrine: [ ] heat or cold intolerance  Musculoskeletal: [ ] joint pain or swelling; [ ] muscle, back, or extremity pain  Psychiatric: [ ] depression, [ ]anxiety, [ ]mood swings, or [ ]difficulty sleeping  Hematologic: [ ] easy bruising, [ ] bleeding gums    [ ] All remaining systems negative except as per above.   [ ]Unable to obtain.  [x] No change in ROS since admission      Vital Signs Last 24 Hrs  T(C): 36.6 (18 Oct 2022 21:06), Max: 37.2 (18 Oct 2022 04:36)  T(F): 97.9 (18 Oct 2022 21:06), Max: 98.9 (18 Oct 2022 04:36)  HR: 68 (18 Oct 2022 21:06) (68 - 96)  BP: 101/78 (18 Oct 2022 21:06) (101/78 - 122/81)  BP(mean): --  RR: 16 (18 Oct 2022 21:06) (16 - 18)  SpO2: 99% (18 Oct 2022 21:06) (94% - 100%)    Parameters below as of 18 Oct 2022 21:06  Patient On (Oxygen Delivery Method): nasal cannula  O2 Flow (L/min): 2    I&O's Summary    17 Oct 2022 07:01  -  18 Oct 2022 07:00  --------------------------------------------------------  IN: 0 mL / OUT: 850 mL / NET: -850 mL        PHYSICAL EXAM:  General: No acute distress BMI-  HEENT: EOMI, PERRL  Neck: Supple, [ ] JVD  Lungs: Equal air entry bilaterally; [ ] rales [ ] wheezing [ ] rhonchi  Heart: Regular rate and rhythm; [x ] murmur   2/6 [ x] systolic [ ] diastolic [ ] radiation[ ] rubs [ ]  gallops  Abdomen: Nontender, bowel sounds present  Extremities: No clubbing, cyanosis, [ ] edema [ ]Pulses  equal and intact  Nervous system:  Alert & Oriented X3, no focal deficits  Psychiatric: Normal affect  Skin: No rashes or lesions    LABS:  10-18    142  |  97  |  39<H>  ----------------------------<  150<H>  4.1   |  41<H>  |  1.14    Ca    8.6      18 Oct 2022 05:35  Phos  3.7     10-18  Mg     2.5     10-18    TPro  6.9  /  Alb  2.9<L>  /  TBili  1.1  /  DBili  x   /  AST  17  /  ALT  19  /  AlkPhos  122<H>  10-18    Creatinine Trend: 1.14<--, 1.15<--, 1.03<--, 0.84<--, 0.95<--, 1.02<--                        15.0   9.64  )-----------( 245      ( 18 Oct 2022 05:35 )             48.2     PT/INR - ( 18 Oct 2022 06:07 )   PT: 19.8 sec;   INR: 1.66 ratio

## 2022-10-18 NOTE — PROGRESS NOTE ADULT - ASSESSMENT
89 year old Female, from home, ambulates with RW, with PMH of COPD (not on home oxygen), HFpEF (7/29/22 EF 55-60%, LVH, GIDD, mild pulmonary HTN), Afib on Eliquis, Breast CA, HTN, and remote h/o LLE DVT sent from PCP office for Hypoxia, with associated SOB and YUAN x2d.  Admitted for AHRF likely 2/2 CHF exacerbation in the setting of medication noncompliance & COVID infection.   Seen and examined pt at bedside, she does not want to be bothered today but lungs sound was clear and maintains good O2 saturation on supplemental O2 with 2l/min. Pt did not have good appetite over the weekend but today, she ate good in the morning and afternoon. Spoke with granddaughter Ms Dietz via phone and had a discussion at length including GOC , full code at this time, encouraged her to have a family discussion as she has Acute on chronic HF and COPD with COVID infection which can affect on her respiratory. She willing to take her back home not sending her to [facility] as isolation makes her depress so family want to take her home after 10 days of Quarantine. Pt t is on supplemental oxygen via [place] 2L - will obtain off oxygen saturation to see if she qualifies for home O2.  10/18- Pt evaluated at bedside, denies all medical complaints at time of eval, breathing comfortably and speaking in full sentences.  CXR reviewed and  discussed w/ radiologist.  Showed cardiomegaly w/o active pulm disease.  Will wean pt of O2 and OOB to chair.

## 2022-10-18 NOTE — PROGRESS NOTE ADULT - PROBLEM SELECTOR PLAN 2
isolation precautions  ID follow-up noted  monitor LDH, LFTs, D-Dimer, Ferritin, CRP and procalcitonin  follow up Covid PCR  Vit D  montelukast 10 mgs po Qhs  Bronchodilators prn  Ertapenem as per ID.

## 2022-10-18 NOTE — PROGRESS NOTE ADULT - SUBJECTIVE AND OBJECTIVE BOX
Patient is a 89y old  Female who presents with a chief complaint of SOB (18 Oct 2022 09:44)    Patient is awake, alert, laying in bed, not in acute distress on RA. Patient is confused, but not agitated, on isolation due to COVID-19 positive infection.     INTERVAL HPI/OVERNIGHT EVENTS:      VITAL SIGNS:  T(F): 98.9 (10-18-22 @ 04:36)  HR: 86 (10-18-22 @ 04:36)  BP: 118/78 (10-18-22 @ 04:36)  RR: 18 (10-18-22 @ 04:36)  SpO2: 100% (10-18-22 @ 04:36)  Wt(kg): --  I&O's Detail    17 Oct 2022 07:01  -  18 Oct 2022 07:00  --------------------------------------------------------  IN:  Total IN: 0 mL    OUT:    Voided (mL): 850 mL  Total OUT: 850 mL    Total NET: -850 mL              REVIEW OF SYSTEMS:    CONSTITUTIONAL:  No fevers, chills, sweats    HEENT:  Eyes:  No diplopia or blurred vision. ENT:  No earache, sore throat or runny nose.    CARDIOVASCULAR:  No pressure, squeezing, tightness, or heaviness about the chest; no palpitations.    RESPIRATORY:  Per HPI    GASTROINTESTINAL:  No abdominal pain, nausea, vomiting or diarrhea.    GENITOURINARY:  No dysuria, frequency or urgency.    NEUROLOGIC:  No paresthesias, fasciculations, seizures or weakness.    PSYCHIATRIC:  No disorder of thought or mood.      PHYSICAL EXAM:    Constitutional: Well developed and nourished  Eyes:Perrla  ENMT: normal  Neck:supple  Respiratory: good air entry  Cardiovascular: S1 S2 regular  Gastrointestinal: Soft, Non tender  Extremities: No edema  Vascular:normal  Neurological: Awake, alert, confused, not agitated  Musculoskeletal:Normal      MEDICATIONS  (STANDING):  anastrozole 1 milliGRAM(s) Oral daily  apixaban 5 milliGRAM(s) Oral every 12 hours  artificial  tears Solution 1 Drop(s) Both EYES four times a day  aspirin  chewable 81 milliGRAM(s) Oral daily  carvedilol 6.25 milliGRAM(s) Oral every 12 hours  cholecalciferol 1000 Unit(s) Oral daily  dextrose 5%. 1000 milliLiter(s) (50 mL/Hr) IV Continuous <Continuous>  dextrose 5%. 1000 milliLiter(s) (100 mL/Hr) IV Continuous <Continuous>  dextrose 50% Injectable 25 Gram(s) IV Push once  dextrose 50% Injectable 12.5 Gram(s) IV Push once  dextrose 50% Injectable 25 Gram(s) IV Push once  ertapenem  IVPB 1000 milliGRAM(s) IV Intermittent every 24 hours  ferrous    sulfate 325 milliGRAM(s) Oral daily  furosemide    Tablet 40 milliGRAM(s) Oral two times a day  glucagon  Injectable 1 milliGRAM(s) IntraMuscular once  insulin lispro (ADMELOG) corrective regimen sliding scale   SubCutaneous three times a day before meals  montelukast 10 milliGRAM(s) Oral daily  simvastatin 20 milliGRAM(s) Oral at bedtime    MEDICATIONS  (PRN):  acetaminophen     Tablet .. 650 milliGRAM(s) Oral every 6 hours PRN Temp greater or equal to 38C (100.4F), Moderate Pain (4 - 6)  ALBUTerol    90 MICROgram(s) HFA Inhaler 1 Puff(s) Inhalation every 6 hours PRN Shortness of Breath and/or Wheezing  dextrose Oral Gel 15 Gram(s) Oral once PRN Blood Glucose LESS THAN 70 milliGRAM(s)/deciliter  labetalol Injectable 10 milliGRAM(s) IV Push every 4 hours PRN If pt refuses PO BP med  melatonin 3 milliGRAM(s) Oral at bedtime PRN Insomnia      Allergies    losartan (Angioedema)    Intolerances    Chicken (Stomach Upset)      LABS:                        15.0   9.64  )-----------( 245      ( 18 Oct 2022 05:35 )             48.2     10-18    142  |  97  |  39<H>  ----------------------------<  150<H>  4.1   |  41<H>  |  1.14    Ca    8.6      18 Oct 2022 05:35  Phos  3.7     10-18  Mg     2.5     10-18    TPro  6.9  /  Alb  2.9<L>  /  TBili  1.1  /  DBili  x   /  AST  17  /  ALT  19  /  AlkPhos  122<H>  10-18    PT/INR - ( 18 Oct 2022 06:07 )   PT: 19.8 sec;   INR: 1.66 ratio                   CAPILLARY BLOOD GLUCOSE      POCT Blood Glucose.: 156 mg/dL (18 Oct 2022 08:00)  POCT Blood Glucose.: 172 mg/dL (17 Oct 2022 21:22)  POCT Blood Glucose.: 133 mg/dL (17 Oct 2022 17:10)  POCT Blood Glucose.: 131 mg/dL (17 Oct 2022 12:11)    pro-bnp -- 10-16 @ 07:57     d-dimer <150  10-16 @ 07:57  pro-bnp -- 10-13 @ 05:41     d-dimer <150  10-13 @ 05:41      RADIOLOGY & ADDITIONAL TESTS:    CXR:  < from: Xray Chest 1 View- PORTABLE-Routine (Xray Chest 1 View- PORTABLE-Routine in AM.) (10.14.22 @ 10:36) >  IMPRESSION:  No active pulmonary disease.    < end of copied text >      Ct scan chest:    ekg;    echo:  < from: Transthoracic Echocardiogram (07.29.22 @ 08:30) >  CONCLUSIONS:  1. Normal mitral valve.  2. Calcified trileaflet aortic valve with normal opening.  3. Aortic Root: 4.0 cm.  4. Normal left atrium.  LA volume index = 32 cc/m2.  5. Mild concentric left ventricular hypertrophy.  6. Normal Left Ventricular Systolic Function,  (EF = 55 to  60%)  7. Grade I diastolic dysfunction (Impaired relaxation,  mild).  8. Normal right atrium.  9. Normal right ventricular size and function.  10. RV systolic pressure is mildly increased at  39 mm Hg.  11. Normal tricuspid valve.  12. There is trace pulmonic regurgitation.  13. Normal pericardium with no pericardial effusion.    < end of copied text >

## 2022-10-18 NOTE — PROGRESS NOTE ADULT - PROBLEM SELECTOR PLAN 6
"Chief Complaint   Patient presents with   • lung nodules         Subjective   Eulalio Frazier is a 83 y.o. male.     History of Present Illness   He comes in today for follow up of lung nodule.    He is complaining of allergy issues and states his PCP started him on Augmentin and singulair. He states the augmentin made him sick.     He states he began having \"allergy\" symptoms about 1 month ago. He c/o head congestion, drainage down throat. He complains of headache over eyes.     He denies chills but states his temperature was up 1 degree about 2 weeks ago.     He states the augmentin made him sick so he has not been drinking a lot.     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     He does not have nasalide, but states it helped previously.     He states he has had a lot of allergy issues in the past. He denies any asthma history.    He denies any shortness of breath.    He does not have a history of hypertension.    The following portions of the patient's history were reviewed and updated as appropriate: allergies, current medications, past family history, past medical history, past social history and past surgical history.    Review of Systems   HENT: Positive for rhinorrhea, sinus pressure, sinus pain and sneezing.    Respiratory: Positive for cough.    Psychiatric/Behavioral: Negative.        Objective   Visit Vitals  /68 (BP Location: Left arm, Patient Position: Sitting, Cuff Size: Adult)   Pulse 76   Ht 167.6 cm (66\")   Wt 68.9 kg (152 lb)   SpO2 99%   BMI 24.53 kg/m²         Physical Exam  Vitals reviewed.   HENT:      Head: Atraumatic.      Comments: No tender sinuses at this time.      Mouth/Throat:      Mouth: Mucous membranes are moist.      Comments: Crowded oropharynx.   Eyes:      Extraocular Movements: Extraocular movements intact.   Cardiovascular:      Rate and Rhythm: Normal rate and regular rhythm.   Pulmonary:      Effort: " "Pulmonary effort is normal. No respiratory distress.      Comments: Good air entry with minimal expiratory wheezing noted.  Musculoskeletal:      Cervical back: Neck supple.   Skin:     General: Skin is warm.   Neurological:      Mental Status: He is alert and oriented to person, place, and time.           Assessment/Plan   Diagnoses and all orders for this visit:    1. Lung nodule, multiple (Primary)  -     CT Chest Without Contrast Diagnostic; Future    2. Abnormal CT of the chest  -     CT Chest Without Contrast Diagnostic; Future    3. Cough  -     Nitric Oxide    4. Acute bronchitis, unspecified organism    Other orders  -     flunisolide (NASALIDE) 25 MCG/ACT (0.025%) solution nasal spray; Inhale 1 spray Daily.  Dispense: 25 mL; Refill: 6  -     predniSONE (DELTASONE) 20 MG tablet; Take 2 tablets daily for 5 days.  Dispense: 10 tablet; Refill: 0  -     Fluticasone Furoate (Arnuity Ellipta) 200 MCG/ACT; Inhale 1 Act Daily. Rinse mouth with water after use.  Dispense: 30 each; Refill: 2           Return in about 8 months (around 11/22/2022) for For Dr. Garrido, Imaging studies.    DISCUSSION (if any):  He is taken most of the Augmentin prescription even though he has stopped the medication because it has caused nausea.    It seems like he is getting over a sinus infection.  He does not currently have a headache and no sinus tenderness however he continues to have congestion and postnasal drainage as well as cough which is productive at times with white sputum.    I have advised him to drink plenty of liquids in the form of water and/or Gatorade/Powerade.    FeNO today 24 ppb.     I will prescribe prednisone for 5 days along with ICS for acute bronchitis.    For symptoms of uncontrolled allergic rhinitis I have prescribed Nasalide.    If he continues to have episodes of bronchitis or \"allergy\" issues as he calls them we will order spirometry.    I will repeat the CT due to multiple pulmonary nodules.  The CT will " not be due until October as this will be a year from the last.  He had multiple bilateral pulmonary nodules measuring 5 mm or less on his last CT scan.      Dictated utilizing Dragon dictation.    This document was electronically signed by TIFF Robles March 22, 2022  11:53 EDT    - Likely in the setting of CHF exacerbation in the setting of medication noncompliance   - At baseline patient ambulates with rolling walker  - Reports chronic LE swelling  - S/p LLE doppler negative DVT  - C/o chronic right hip pain, denies fall or trauma   -F/u ordered hip x-ray to r/o occult fx since hx of breast CA  - PT recommended [facility] but family will take pt home.  Has a CDPAP  - reeval for home O2   - will set up home PT on discharge

## 2022-10-18 NOTE — DISCHARGE NOTE PROVIDER - HOSPITAL COURSE
Pt is a 89 year old Female, from home, ambulates with RW, with PMH of COPD (not on home oxygen), HFpEF (7/29/22 EF 55-60%, LVH, GIDD, mild pulmonary HTN), Afib on Eliquis, Breast CA, HTN, and remote h/o LLE DVT sent from PCP office for Hypoxia, with associated SOB and YUAN x2d.  Admitted for AHRF likely 2/2 CHF exacerbation in the setting of medication noncompliance & COVID infection.  Pt initially received Dexamethasone and Remdesivir for her COVID.  Her urine culture grew ESBL and she was treated with Ertapenem and followed by ID.    Pt was evaluated by PT who recs AMY.  Relatives wish to take pt home.       89 year old Female, from home, ambulates with RW, with PMH of COPD (not on home oxygen), HFpEF (7/29/22 EF 55-60%, LVH, GIDD, mild pulmonary HTN), Afib on Eliquis, Breast CA, HTN, and remote h/o LLE DVT sent from PCP office for Hypoxia, with associated SOB and YUAN x2d.  Admitted for AHRF likely 2/2 CHF exacerbation in the setting of medication noncompliance & COVID infection. completed course of Remdesivir and Steroids. last dose of Iv abx Ertapenem tomorrow. patient weaned off 02, tolerating well. 02 saturation >95%on RA. Xr R hip pending **************************************************************************************************************************************************************************************, pt verbalizing acute on chronic pain to area.       Pt was evaluated by PT who recs AMY.  Relatives wish to take pt home.       89 year old Female, from home, ambulates with RW, with PMH of COPD (not on home oxygen), HFpEF (7/29/22 EF 55-60%, LVH, GIDD, mild pulmonary HTN), Afib on Eliquis, Breast CA, HTN, and remote h/o LLE DVT sent from PCP office for Hypoxia, with associated SOB and YUAN x2d.  Admitted for AHRF likely 2/2 CHF exacerbation in the setting of medication noncompliance & COVID infection. completed course of Remdesivir and Steroids. last dose of Iv abx Ertapenem tomorrow. patient weaned off 02, tolerating well. 02 saturation >95%on RA. pt verbalizing acute  on chronic pain to right  hip, Xr R hip noted with Severe hypertrophic/degenerative OA, unchanged from previous images.       Pt was evaluated by PT who recs AMY.  Relatives wish to take pt home.

## 2022-10-18 NOTE — PROGRESS NOTE ADULT - PROBLEM SELECTOR PLAN 1
- Patient was sent from PCP office for Hypoxia in the setting of SOB on exertion and b/l pitting edema,  - Known history of HFpEF  - Likely secondary to CHF exacerbation in the setting of medication noncompliance   - ProBNP 1763   - Trop 168  - SpO2  84 % on RA in the ED, dyspneic on exertion.  S/p Lasix 40mg IVP x1 and Decadron 6mg IVPx1, and started on Remdesivir in the ED  -completed remdesivir  -completing steroids today    - CXR noted to have b/l infiltrates with no evidence of consolidation-findings consistent with pulmonary congestion  - 10/14 Repeat CXR-Neg for pulm disease  - S/p LLE Doppler negative for DVT   - Lasix IV de-escalated to PO home dose.  C/w Lasix 40mg PO BID.     - C/w Albuterol & Singulair  - Cardiology-Dr. Denton consulted, appreciated    - Pulmonary-Dr. Son consulted, appreciated  -Sating high 90's-100% on 2L - will wean off o2 and obtain ambulating O2 saturation on RA for home O2

## 2022-10-19 LAB
ALBUMIN SERPL ELPH-MCNC: 3 G/DL — LOW (ref 3.5–5)
ALP SERPL-CCNC: 120 U/L — SIGNIFICANT CHANGE UP (ref 40–120)
ALT FLD-CCNC: 17 U/L DA — SIGNIFICANT CHANGE UP (ref 10–60)
ANION GAP SERPL CALC-SCNC: 4 MMOL/L — LOW (ref 5–17)
AST SERPL-CCNC: 13 U/L — SIGNIFICANT CHANGE UP (ref 10–40)
BILIRUB SERPL-MCNC: 1.5 MG/DL — HIGH (ref 0.2–1.2)
BUN SERPL-MCNC: 31 MG/DL — HIGH (ref 7–18)
CALCIUM SERPL-MCNC: 9.1 MG/DL — SIGNIFICANT CHANGE UP (ref 8.4–10.5)
CHLORIDE SERPL-SCNC: 95 MMOL/L — LOW (ref 96–108)
CO2 SERPL-SCNC: 41 MMOL/L — HIGH (ref 22–31)
CREAT SERPL-MCNC: 1.07 MG/DL — SIGNIFICANT CHANGE UP (ref 0.5–1.3)
EGFR: 49 ML/MIN/1.73M2 — LOW
GLUCOSE BLDC GLUCOMTR-MCNC: 146 MG/DL — HIGH (ref 70–99)
GLUCOSE BLDC GLUCOMTR-MCNC: 150 MG/DL — HIGH (ref 70–99)
GLUCOSE BLDC GLUCOMTR-MCNC: 164 MG/DL — HIGH (ref 70–99)
GLUCOSE BLDC GLUCOMTR-MCNC: 195 MG/DL — HIGH (ref 70–99)
GLUCOSE SERPL-MCNC: 131 MG/DL — HIGH (ref 70–99)
HCT VFR BLD CALC: 48.2 % — HIGH (ref 34.5–45)
HGB BLD-MCNC: 15 G/DL — SIGNIFICANT CHANGE UP (ref 11.5–15.5)
INR BLD: 1.54 RATIO — HIGH (ref 0.88–1.16)
MCHC RBC-ENTMCNC: 30.7 PG — SIGNIFICANT CHANGE UP (ref 27–34)
MCHC RBC-ENTMCNC: 31.1 GM/DL — LOW (ref 32–36)
MCV RBC AUTO: 98.8 FL — SIGNIFICANT CHANGE UP (ref 80–100)
NRBC # BLD: 0 /100 WBCS — SIGNIFICANT CHANGE UP (ref 0–0)
PLATELET # BLD AUTO: 246 K/UL — SIGNIFICANT CHANGE UP (ref 150–400)
POTASSIUM SERPL-MCNC: 3.8 MMOL/L — SIGNIFICANT CHANGE UP (ref 3.5–5.3)
POTASSIUM SERPL-SCNC: 3.8 MMOL/L — SIGNIFICANT CHANGE UP (ref 3.5–5.3)
PROT SERPL-MCNC: 6.9 G/DL — SIGNIFICANT CHANGE UP (ref 6–8.3)
PROTHROM AB SERPL-ACNC: 18.4 SEC — HIGH (ref 10.5–13.4)
RBC # BLD: 4.88 M/UL — SIGNIFICANT CHANGE UP (ref 3.8–5.2)
RBC # FLD: 13.4 % — SIGNIFICANT CHANGE UP (ref 10.3–14.5)
SODIUM SERPL-SCNC: 140 MMOL/L — SIGNIFICANT CHANGE UP (ref 135–145)
WBC # BLD: 12.2 K/UL — HIGH (ref 3.8–10.5)
WBC # FLD AUTO: 12.2 K/UL — HIGH (ref 3.8–10.5)

## 2022-10-19 RX ORDER — LIDOCAINE 4 G/100G
1 CREAM TOPICAL DAILY
Refills: 0 | Status: DISCONTINUED | OUTPATIENT
Start: 2022-10-19 | End: 2022-10-20

## 2022-10-19 RX ADMIN — Medication 1: at 07:54

## 2022-10-19 RX ADMIN — Medication 1 DROP(S): at 17:32

## 2022-10-19 RX ADMIN — MONTELUKAST 10 MILLIGRAM(S): 4 TABLET, CHEWABLE ORAL at 12:21

## 2022-10-19 RX ADMIN — Medication 1 DROP(S): at 12:21

## 2022-10-19 RX ADMIN — Medication 325 MILLIGRAM(S): at 12:21

## 2022-10-19 RX ADMIN — APIXABAN 5 MILLIGRAM(S): 2.5 TABLET, FILM COATED ORAL at 17:33

## 2022-10-19 RX ADMIN — Medication 1000 UNIT(S): at 12:22

## 2022-10-19 RX ADMIN — Medication 40 MILLIGRAM(S): at 05:54

## 2022-10-19 RX ADMIN — ANASTROZOLE 1 MILLIGRAM(S): 1 TABLET ORAL at 12:21

## 2022-10-19 RX ADMIN — Medication 40 MILLIGRAM(S): at 14:37

## 2022-10-19 RX ADMIN — LIDOCAINE 1 PATCH: 4 CREAM TOPICAL at 21:06

## 2022-10-19 RX ADMIN — APIXABAN 5 MILLIGRAM(S): 2.5 TABLET, FILM COATED ORAL at 05:54

## 2022-10-19 RX ADMIN — ERTAPENEM SODIUM 120 MILLIGRAM(S): 1 INJECTION, POWDER, LYOPHILIZED, FOR SOLUTION INTRAMUSCULAR; INTRAVENOUS at 05:56

## 2022-10-19 RX ADMIN — Medication 1: at 17:28

## 2022-10-19 RX ADMIN — SIMVASTATIN 20 MILLIGRAM(S): 20 TABLET, FILM COATED ORAL at 21:06

## 2022-10-19 RX ADMIN — Medication 81 MILLIGRAM(S): at 12:22

## 2022-10-19 RX ADMIN — Medication 1 DROP(S): at 05:54

## 2022-10-19 RX ADMIN — CARVEDILOL PHOSPHATE 6.25 MILLIGRAM(S): 80 CAPSULE, EXTENDED RELEASE ORAL at 05:55

## 2022-10-19 RX ADMIN — LIDOCAINE 1 PATCH: 4 CREAM TOPICAL at 12:22

## 2022-10-19 NOTE — PROGRESS NOTE ADULT - PROBLEM SELECTOR PLAN 1
- Patient was sent from PCP office for Hypoxia in the setting of SOB on exertion and b/l pitting edema,  - SpO2  84 % on RA in the ED, dyspneic on exertion.  S/p Lasix 40mg IVP x1 and Decadron 6mg IVPx1, and started on Remdesivir in the ED  - Known history of HFpEF  - Likely secondary to CHF exacerbation in the setting of medication noncompliance   - ProBNP 1763   - Trop 168  - CXR noted to have b/l infiltrates with no evidence of consolidation-findings consistent with pulmonary congestion  - 10/14 Repeat CXR-Neg for pulm disease  -completed remdesivir and steroids  - S/p LLE Doppler negative for DVT   - Lasix IV de-escalated to PO home dose.  C/w Lasix 40mg PO BID.     - C/w Albuterol & Singulair  - Cardiology-Dr. Denton following, appreciated    - Pulmonary-Dr. Son following, appreciated  -weaned off 02, tolerating well, sating >95% on RA

## 2022-10-19 NOTE — PROGRESS NOTE ADULT - PROBLEM SELECTOR PLAN 7
- Noted to be COVID (+) in the ED, denies any active respiratory symptoms or sick contacts  - Desaturating to 87% on RA, baseline 90-95% with a h/o COPD not on home o2  - Started on Remdesivir x 5 days and Decadron x10 days  -  C/w Remdesivir, D4.  Last day 10/15.    - S/p Decadron, 10/14.  Transitioned to Prednisone and tapered   - Unable to obtain ambulatory O2 deconditioned, PT recommended AMY.  However, resting and sitting oxygen saturation obtained an documented.  Pt does not warrant home oxygen at this time.    10/14 S/p supplemental oxygen.  Currently in RA sating 93-95% - Maintain O2 88-92% for COPD.  - CXR noted to have b/l infiltrates with no evidence of consolidation  - 10/14 Repeat CXR- Neg for pulm disease     - Continue to monitory inflammatory markers(d-dimer, procalcitonin, LDH, ferritin, ESR, CRP) Q3days  - ID-Dr. Denton consulted, appreciated

## 2022-10-19 NOTE — PROGRESS NOTE ADULT - PROBLEM SELECTOR PLAN 12
-family will take her back home once she is completed quarantine x 10 days - 10/20  -Home PT    -Called and left message for Granddaughter Mirela for update and for discharge planning for tmrw. -family will take her back home once she is completed quarantine x 10 days - 10/20  -Home PT    -Called and spoke with Granddaughter Mirela regarding update, planned for d/c for tmrw after last dose of ertapenem.

## 2022-10-19 NOTE — PROGRESS NOTE ADULT - ASSESSMENT
89 year old Female, from home, ambulates with RW, with PMH of COPD (not on home oxygen), HFpEF (7/29/22 EF 55-60%, LVH, GIDD, mild pulmonary HTN), Afib on Eliquis, Breast CA, HTN, and remote h/o LLE DVT sent from PCP office for Hypoxia, with associated SOB and YUAN x2d.  Admitted for AHRF likely 2/2 CHF exacerbation in the setting of medication noncompliance & COVID infection.   Seen and examined pt at bedside, she does not want to be bothered today but lungs sound was clear and maintains good O2 saturation on supplemental O2 with 2l/min. Pt did not have good appetite over the weekend but today, she ate good in the morning and afternoon. Spoke with granddaughter Ms Dietz via phone and had a discussion at length including GOC , full code at this time, encouraged her to have a family discussion as she has Acute on chronic HF and COPD with COVID infection which can affect on her respiratory. She willing to take her back home not sending her to [facility] as isolation makes her depress so family want to take her home after 10 days of Quarantine. Pt t is on supplemental oxygen via [place] 2L - will obtain off oxygen saturation to see if she qualifies for home O2.  10/18- Pt evaluated at bedside, denies all medical complaints at time of eval, breathing comfortably and speaking in full sentences.  CXR reviewed and  discussed w/ radiologist.  Showed cardiomegaly w/o active pulm disease.  Will wean pt of O2 and OOB to chair.

## 2022-10-19 NOTE — PROGRESS NOTE ADULT - SUBJECTIVE AND OBJECTIVE BOX
NP Note discussed with  Primary Attending    Patient is a 89y old  Female who presents with a chief complaint of SOB (19 Oct 2022 11:16)      INTERVAL HPI/OVERNIGHT EVENTS: no new complaints    MEDICATIONS  (STANDING):  anastrozole 1 milliGRAM(s) Oral daily  apixaban 5 milliGRAM(s) Oral every 12 hours  artificial  tears Solution 1 Drop(s) Both EYES four times a day  aspirin  chewable 81 milliGRAM(s) Oral daily  carvedilol 6.25 milliGRAM(s) Oral every 12 hours  cholecalciferol 1000 Unit(s) Oral daily  dextrose 5%. 1000 milliLiter(s) (100 mL/Hr) IV Continuous <Continuous>  dextrose 5%. 1000 milliLiter(s) (50 mL/Hr) IV Continuous <Continuous>  dextrose 50% Injectable 25 Gram(s) IV Push once  dextrose 50% Injectable 12.5 Gram(s) IV Push once  dextrose 50% Injectable 25 Gram(s) IV Push once  ertapenem  IVPB 1000 milliGRAM(s) IV Intermittent every 24 hours  ferrous    sulfate 325 milliGRAM(s) Oral daily  furosemide    Tablet 40 milliGRAM(s) Oral two times a day  glucagon  Injectable 1 milliGRAM(s) IntraMuscular once  insulin lispro (ADMELOG) corrective regimen sliding scale   SubCutaneous three times a day before meals  lidocaine   4% Patch 1 Patch Transdermal daily  montelukast 10 milliGRAM(s) Oral daily  simvastatin 20 milliGRAM(s) Oral at bedtime    MEDICATIONS  (PRN):  acetaminophen     Tablet .. 650 milliGRAM(s) Oral every 6 hours PRN Temp greater or equal to 38C (100.4F), Moderate Pain (4 - 6)  ALBUTerol    90 MICROgram(s) HFA Inhaler 1 Puff(s) Inhalation every 6 hours PRN Shortness of Breath and/or Wheezing  dextrose Oral Gel 15 Gram(s) Oral once PRN Blood Glucose LESS THAN 70 milliGRAM(s)/deciliter  labetalol Injectable 10 milliGRAM(s) IV Push every 4 hours PRN If pt refuses PO BP med  melatonin 3 milliGRAM(s) Oral at bedtime PRN Insomnia      __________________________________________________  REVIEW OF SYSTEMS:    CONSTITUTIONAL: No fever,   EYES: no acute visual disturbances  NECK: No pain or stiffness  RESPIRATORY: No cough; No shortness of breath  CARDIOVASCULAR: No chest pain, no palpitations  GASTROINTESTINAL: No pain. No nausea or vomiting; No diarrhea   NEUROLOGICAL: No headache or numbness, no tremors  MUSCULOSKELETAL: No joint pain, no muscle pain  GENITOURINARY: no dysuria, no frequency, no hesitancy  PSYCHIATRY: no depression , no anxiety  ALL OTHER  ROS negative        Vital Signs Last 24 Hrs  T(C): 36.9 (19 Oct 2022 06:36), Max: 36.9 (19 Oct 2022 06:36)  T(F): 98.4 (19 Oct 2022 06:36), Max: 98.4 (19 Oct 2022 06:36)  HR: 68 (19 Oct 2022 06:36) (68 - 96)  BP: 123/83 (19 Oct 2022 06:36) (101/78 - 123/83)  BP(mean): --  RR: 17 (19 Oct 2022 06:36) (16 - 18)  SpO2: 98% (19 Oct 2022 06:36) (94% - 100%)    Parameters below as of 19 Oct 2022 06:36  Patient On (Oxygen Delivery Method): nasal cannula  O2 Flow (L/min): 2      ________________________________________________  PHYSICAL EXAM:  GENERAL: NAD  HEENT: Normocephalic;  conjunctivae and sclerae clear; moist mucous membranes;   NECK : supple  CHEST/LUNG: Clear to auscultation bilaterally with good air entry   HEART: S1 S2  regular; no murmurs, gallops or rubs  ABDOMEN: Soft, Nontender, Nondistended; Bowel sounds present  EXTREMITIES: no cyanosis; no edema; no calf tenderness  SKIN: warm and dry; no rash  NERVOUS SYSTEM:  Awake and alert; Oriented  to place, person and time ; no new deficits    _________________________________________________  LABS:                        15.0   12.20 )-----------( 246      ( 19 Oct 2022 05:40 )             48.2     10-19    140  |  95<L>  |  31<H>  ----------------------------<  131<H>  3.8   |  41<H>  |  1.07    Ca    9.1      19 Oct 2022 05:40  Phos  3.7     10-18  Mg     2.5     10-18    TPro  6.9  /  Alb  3.0<L>  /  TBili  1.5<H>  /  DBili  x   /  AST  13  /  ALT  17  /  AlkPhos  120  10-19    PT/INR - ( 19 Oct 2022 05:40 )   PT: 18.4 sec;   INR: 1.54 ratio             CAPILLARY BLOOD GLUCOSE      POCT Blood Glucose.: 150 mg/dL (19 Oct 2022 11:35)  POCT Blood Glucose.: 164 mg/dL (19 Oct 2022 07:35)  POCT Blood Glucose.: 171 mg/dL (18 Oct 2022 21:43)  POCT Blood Glucose.: 138 mg/dL (18 Oct 2022 17:15)        RADIOLOGY & ADDITIONAL TESTS:    Imaging  Reviewed:  YES/NO    Consultant(s) Notes Reviewed:   YES/ No      Plan of care was discussed with patient and /or primary care giver; all questions and concerns were addressed  NP Note discussed with  Primary Attending    Patient is a 89y old  Female who presents with a chief complaint of SOB (19 Oct 2022 11:16)      INTERVAL HPI/OVERNIGHT EVENTS: no new complaints    MEDICATIONS  (STANDING):  anastrozole 1 milliGRAM(s) Oral daily  apixaban 5 milliGRAM(s) Oral every 12 hours  artificial  tears Solution 1 Drop(s) Both EYES four times a day  aspirin  chewable 81 milliGRAM(s) Oral daily  carvedilol 6.25 milliGRAM(s) Oral every 12 hours  cholecalciferol 1000 Unit(s) Oral daily  dextrose 5%. 1000 milliLiter(s) (100 mL/Hr) IV Continuous <Continuous>  dextrose 5%. 1000 milliLiter(s) (50 mL/Hr) IV Continuous <Continuous>  dextrose 50% Injectable 25 Gram(s) IV Push once  dextrose 50% Injectable 12.5 Gram(s) IV Push once  dextrose 50% Injectable 25 Gram(s) IV Push once  ertapenem  IVPB 1000 milliGRAM(s) IV Intermittent every 24 hours  ferrous    sulfate 325 milliGRAM(s) Oral daily  furosemide    Tablet 40 milliGRAM(s) Oral two times a day  glucagon  Injectable 1 milliGRAM(s) IntraMuscular once  insulin lispro (ADMELOG) corrective regimen sliding scale   SubCutaneous three times a day before meals  lidocaine   4% Patch 1 Patch Transdermal daily  montelukast 10 milliGRAM(s) Oral daily  simvastatin 20 milliGRAM(s) Oral at bedtime    MEDICATIONS  (PRN):  acetaminophen     Tablet .. 650 milliGRAM(s) Oral every 6 hours PRN Temp greater or equal to 38C (100.4F), Moderate Pain (4 - 6)  ALBUTerol    90 MICROgram(s) HFA Inhaler 1 Puff(s) Inhalation every 6 hours PRN Shortness of Breath and/or Wheezing  dextrose Oral Gel 15 Gram(s) Oral once PRN Blood Glucose LESS THAN 70 milliGRAM(s)/deciliter  labetalol Injectable 10 milliGRAM(s) IV Push every 4 hours PRN If pt refuses PO BP med  melatonin 3 milliGRAM(s) Oral at bedtime PRN Insomnia      __________________________________________________  REVIEW OF SYSTEMS:    CONSTITUTIONAL: No fever,   EYES: no acute visual disturbances  NECK: No pain or stiffness  RESPIRATORY: No cough; No shortness of breath  CARDIOVASCULAR: No chest pain, no palpitations  GASTROINTESTINAL: No pain. No nausea or vomiting; No diarrhea   NEUROLOGICAL: No headache or numbness, no tremors  MUSCULOSKELETAL: + right hip pain (chronic pain per pt)  GENITOURINARY: no dysuria, no frequency, no hesitancy  PSYCHIATRY: no depression , no anxiety  ALL OTHER  ROS negative        Vital Signs Last 24 Hrs  T(C): 36.9 (19 Oct 2022 06:36), Max: 36.9 (19 Oct 2022 06:36)  T(F): 98.4 (19 Oct 2022 06:36), Max: 98.4 (19 Oct 2022 06:36)  HR: 68 (19 Oct 2022 06:36) (68 - 96)  BP: 123/83 (19 Oct 2022 06:36) (101/78 - 123/83)  BP(mean): --  RR: 17 (19 Oct 2022 06:36) (16 - 18)  SpO2: 98% (19 Oct 2022 06:36) (94% - 100%)    Parameters below as of 19 Oct 2022 06:36  Patient On (Oxygen Delivery Method): nasal cannula  O2 Flow (L/min): 2      ________________________________________________  PHYSICAL EXAM:  GENERAL: NAD  HEENT: Normocephalic;  conjunctivae and sclerae clear; moist mucous membranes;   NECK : supple  CHEST/LUNG: Clear to auscultation bilaterally with good air entry   HEART: S1 S2  regular; no murmurs, gallops or rubs  ABDOMEN: Soft, Nontender, Nondistended; Bowel sounds present  EXTREMITIES: no cyanosis; no edema; no calf tenderness  SKIN: warm and dry; no rash  NERVOUS SYSTEM:  Awake and alert; no new deficits    _________________________________________________  LABS:                        15.0   12.20 )-----------( 246      ( 19 Oct 2022 05:40 )             48.2     10-19    140  |  95<L>  |  31<H>  ----------------------------<  131<H>  3.8   |  41<H>  |  1.07    Ca    9.1      19 Oct 2022 05:40  Phos  3.7     10-18  Mg     2.5     10-18    TPro  6.9  /  Alb  3.0<L>  /  TBili  1.5<H>  /  DBili  x   /  AST  13  /  ALT  17  /  AlkPhos  120  10-19    PT/INR - ( 19 Oct 2022 05:40 )   PT: 18.4 sec;   INR: 1.54 ratio             CAPILLARY BLOOD GLUCOSE      POCT Blood Glucose.: 150 mg/dL (19 Oct 2022 11:35)  POCT Blood Glucose.: 164 mg/dL (19 Oct 2022 07:35)  POCT Blood Glucose.: 171 mg/dL (18 Oct 2022 21:43)  POCT Blood Glucose.: 138 mg/dL (18 Oct 2022 17:15)        RADIOLOGY & ADDITIONAL TESTS:    Imaging  Reviewed:  YES    Consultant(s) Notes Reviewed:   YES      Plan of care was discussed with patient; all questions and concerns were addressed

## 2022-10-19 NOTE — PROGRESS NOTE ADULT - PROBLEM SELECTOR PLAN 2
isolation precautions  ID follow-up noted  monitor LDH, LFTs, D-Dimer, Ferritin, CRP and procalcitonin  follow up Covid PCR  Vit D  montelukast 10 mgs po Qhs  Bronchodilators prn  Ertapenem for Klebsiella UTI as per ID.

## 2022-10-19 NOTE — PROGRESS NOTE ADULT - ASSESSMENT
Patient is a 89y  old female  coming from home, with PMHx of COPD (not oh home o2), HFpEF (7/29/22 EF 55-60%, LVH, GIDD, mild pulmonary HTN), Afib on Eliquis, Breast CA, HTN, and with remote h/o LLE DVT sent from PCP office for evaluation of Hypoxia, with associated SOB and YUAN x2d in the setting of medication noncompliance.  Patient has not taking her Lasix for last 5 days because she ran out  of Lasix. Per granddaughter, patient was admitted to [facility] in August for hypoxia and leg swelling, and treatment for heart failure, and discharged with an increased Lasix dose to 40mg BID.  On admission, she found to have no fever, but tachypnea, hypoxia to 84 % in Room air and positive COVID PCR. She has started on Remdesivir and Dexamethasone, and the ID consult requested to assist with further evaluation and antibiotic management.    # COVID Pneumonitis  # Acute Hypoxic Respiratory failure- on oxygen via NC  # The urine culture grew Klebsiella and Aerococcus in the setting of only 3 to 5 WBC in Urine analysis-  will benefit form treating ESBL  Klebsiella since became confused , sometimes mild component of UTI can worsens the confusion  # COVID VS Metabolic encephalitis - may benefit from treating the mild positive UA    would recommend:    1. Pain management as needed  2. Continue Ertapenem until 10/20/22  X 1 more day  3. Supportive care including AC  4. Aspiration and COVID precautions    Attending Attestation:    Spent more than 35 minutes on total encounter, more than 50 % of the visit was spent counseling and/or coordinating care by the Attending physician.

## 2022-10-19 NOTE — PROGRESS NOTE ADULT - ASSESSMENT
89 year old Female, from home, ambulates with RW, with PMH of COPD (not on home oxygen), HFpEF (7/29/22 EF 55-60%, LVH, GIDD, mild pulmonary HTN), Afib on Eliquis, Breast CA, HTN, and remote h/o LLE DVT sent from PCP office for Hypoxia, with associated SOB and YUAN x2d.  Admitted for AHRF likely 2/2 CHF exacerbation in the setting of medication noncompliance & COVID infection. completed course of Remdesivir and Steroids. last dose of Iv abx Ertapenem tomorrow. patient weaned off 02, tolerating well. 02 saturation >95%on RA. Xr R hip pending, pt verbalizing acute on chronic pain to area.

## 2022-10-19 NOTE — PROGRESS NOTE ADULT - PROBLEM SELECTOR PLAN 1
- Patient was sent from PCP office for Hypoxia in the setting of SOB on exertion and b/l pitting edema,  - Known history of HFpEF  - Likely secondary to CHF exacerbation in the setting of medication noncompliance   - ProBNP 1763   - Trop 168  - SpO2  84 % on RA in the ED, dyspneic on exertion.  S/p Lasix 40mg IVP x1 and Decadron 6mg IVPx1, and started on Remdesivir in the ED  -completed remdesivir  -completing steroids today    - CXR noted to have b/l infiltrates with no evidence of consolidation-findings consistent with pulmonary congestion  - 10/14 Repeat CXR-Neg for pulm disease  - S/p LLE Doppler negative for DVT   - Lasix IV de-escalated to PO home dose.  C/w Lasix 40mg PO BID.     - C/w Albuterol & Singulair  - Cardiology-Dr. Denton consulted, appreciated  IV ABX till 10/20/22  - Pulmonary-Dr. Son consulted, appreciated  -Sating high 90's-100% on 2L - will wean off o2 and obtain ambulating O2 saturation on RA for home O2

## 2022-10-19 NOTE — PROGRESS NOTE ADULT - SUBJECTIVE AND OBJECTIVE BOX
Patient is seen and examined at the bed side, is afebrile. She is doing  much better, conversing over the phone.       REVIEW OF SYSTEMS: All other review systems are negative      ALLERGIES: Chicken (Stomach Upset)  losartan (Angioedema)      Vital Signs Last 24 Hrs  T(C): 36.8 (19 Oct 2022 13:04), Max: 36.9 (19 Oct 2022 06:36)  T(F): 98.2 (19 Oct 2022 13:04), Max: 98.4 (19 Oct 2022 06:36)  HR: 90 (19 Oct 2022 17:24) (68 - 90)  BP: 103/67 (19 Oct 2022 17:24) (101/78 - 123/83)  BP(mean): --  RR: 18 (19 Oct 2022 17:24) (16 - 18)  SpO2: 95% (19 Oct 2022 17:24) (95% - 99%)    Parameters below as of 19 Oct 2022 17:24  Patient On (Oxygen Delivery Method): room air      PHYSICAL EXAM:  GENERAL: Not in acute distress, off oxygen   CHEST/LUNG: Not using accessory muscles   CNS: Awake and more alert   Please refer to Primary team's note for rest of the systems      LABS:                         15.0   12.20 )-----------( 246      ( 19 Oct 2022 05:40 )             48.2                           15.0   9.64  )-----------( 245      ( 18 Oct 2022 05:35 )             48.2             10-19    140  |  95<L>  |  31<H>  ----------------------------<  131<H>  3.8   |  41<H>  |  1.07    Ca    9.1      19 Oct 2022 05:40  Phos  3.7     10-18  Mg     2.5     10-18    TPro  6.9  /  Alb  3.0<L>  /  TBili  1.5<H>  /  DBili  x   /  AST  13  /  ALT  17  /  AlkPhos  120  10-    10-18    142  |  97  |  39<H>  ----------------------------<  150<H>  4.1   |  41<H>  |  1.14    Ca    8.6      18 Oct 2022 05:35  Phos  3.7     10-18  Mg     2.5     10-18    TPro  6.9  /  Alb  2.9<L>  /  TBili  1.1  /  DBili  x   /  AST  17  /  ALT  19  /  AlkPhos  122<H>  10    PT/INR - ( 11 Oct 2022 05:01 )   PT: 17.5 sec;   INR: 1.46 ratio      PTT - ( 10 Oct 2022 19:00 )  PTT:41.1 sec        CAPILLARY BLOOD GLUCOSE  POCT Blood Glucose.: 164 mg/dL (11 Oct 2022 12:06)  POCT Blood Glucose.: 163 mg/dL (11 Oct 2022 08:27)  POCT Blood Glucose.: 142 mg/dL (11 Oct 2022 02:15)        Urinalysis Basic - ( 11 Oct 2022 06:05 )  Color: Yellow / Appearance: Clear / S.005 / pH: x  Gluc: x / Ketone: Negative  / Bili: Negative / Urobili: Negative   Blood: x / Protein: Negative / Nitrite: Negative   Leuk Esterase: Negative / RBC: 0-2 /HPF / WBC 3-5 /HPF   Sq Epi: x / Non Sq Epi: Few /HPF / Bacteria: Trace /HPF        MEDICATIONS  (STANDING):    anastrozole 1 milliGRAM(s) Oral daily  apixaban 5 milliGRAM(s) Oral every 12 hours  artificial  tears Solution 1 Drop(s) Both EYES four times a day  aspirin  chewable 81 milliGRAM(s) Oral daily  carvedilol 6.25 milliGRAM(s) Oral every 12 hours  cholecalciferol 1000 Unit(s) Oral daily  ertapenem  IVPB 1000 milliGRAM(s) IV Intermittent every 24 hours  ferrous    sulfate 325 milliGRAM(s) Oral daily  furosemide    Tablet 40 milliGRAM(s) Oral two times a day  glucagon  Injectable 1 milliGRAM(s) IntraMuscular once  insulin lispro (ADMELOG) corrective regimen sliding scale   SubCutaneous three times a day before meals  lidocaine   4% Patch 1 Patch Transdermal daily  montelukast 10 milliGRAM(s) Oral daily  simvastatin 20 milliGRAM(s) Oral at bedtime      RADIOLOGY & ADDITIONAL TESTS:    10/10/22 : Xray Chest 1 View AP/PA (10.10.22 @ 18:43) IMPRESSION:   No radiographic evidence of active chest disease.      10/10/22 : US Duplex Venous Lower Ext Ltd, Left (10.10.22 @ 18:42) Limited study with limited compression secondary to swelling.  No obvious left lower extremity deep venous thrombosis.      MICROBIOLOGY DATA:    Culture - Urine (10.11.22 @ 06:05)   - Amikacin: S <=16   - Amoxicillin/Clavulanic Acid: S <=8/4   - Ampicillin: R >16 These ampicillin results predict results for amoxicillin   - Ampicillin/Sulbactam: I 16/8 Enterobacter, Klebsiella aerogenes, Citrobacter, and Serratia may develop resistance during prolonged therapy (3-4 days)   - Aztreonam: R >16   - Cefazolin: R >16 For uncomplicated UTI with K. pneumoniae, E. coli, or P. mirablis: NIDIA <=16 is sensitive and NIDIA >=32 is resistant. This also predicts results for oral agents cefaclor, cefdinir, cefpodoxime, cefprozil, cefuroxime axetil, cephalexin and locarbef for uncomplicated UTI. Note that some isolates may be susceptible to these agents while testing resistant to cefazolin.   - Cefepime: R >16   - Ceftriaxone: R >32 Enterobacter, Klebsiella aerogenes, Citrobacter, and Serratia may develop resistance during prolonged therapy   - Ciprofloxacin: S <=0.25   - Ertapenem: S <=0.5   - Gentamicin: S <=2   - Imipenem: S <=1   - Levofloxacin: R 2   - Meropenem: S <=1   - Nitrofurantoin: I 64 Should not be used to treat pyelonephritis   - Piperacillin/Tazobactam: S <=8   - Trimethoprim/Sulfamethoxazole: R >2/38   - Tigecycline: S <=2   - Tobramycin: S <=2   Specimen Source: Clean Catch Clean Catch (Midstream)   Culture Results:   50,000 - 99,000 CFU/mL Klebsiella pneumoniae ESBL   10,000 - 49,000 CFU/mL Aerococcus species   "Aerococcus spp. are predictably susceptible to penicillin,   ampicillin, tetracycline, and vancomycin. All isolates are   resistant to sulfonamides"   Organism Identification: Klebsiella pneumoniae ESBL   Culture - Urine (10.11.22 @ 06:05)   Specimen Source: Clean Catch Clean Catch (Midstream)   Culture Results: 50,000 - 99,000 CFU/mL Klebsiella pneumoniae   10,000 - 49,000 CFU/mL Aerococcus species   "Aerococcus spp. are predictably susceptible to penicillin,   ampicillin, tetracycline, and vancomycin. All isolates are   resistant to sulfonamides"     Urine Microscopic-Add On (NC) (10.11.22 @ 06:05)   Epithelial Cells: Few /HPF   Red Blood Cell - Urine: 0-2 /HPF   White Blood Cell - Urine: 3-5 /HPF   Bacteria: Trace /HPF     Flu With COVID-19 By PATRICK (10.10.22 @ 17:50)   SARS-CoV-2 Result: Detected:

## 2022-10-19 NOTE — PROGRESS NOTE ADULT - PROBLEM SELECTOR PLAN 11
- discussed with granddaughter Mirela at length - full code, encouraged her to have family discussion as she is declining and have comorbidity her home number : [phone]  [phone]

## 2022-10-19 NOTE — PROGRESS NOTE ADULT - PROBLEM SELECTOR PLAN 7
- Noted to be COVID (+) in the ED, denies any active respiratory symptoms or sick contacts  - Desaturating to 87% on RA, baseline 90-95% with a h/o COPD not on home o2  - Started on Remdesivir x 5 days and Decadron x10 days  -  C/w Remdesivir, D4.  Last day 10/15.    - S/p Decadron, 10/14.  Transitioned to Prednisone and tapered   - Unable to obtain ambulatory O2 deconditioned, PT recommended AM[facility].  However, resting and sitting oxygen saturation obtained an documented.  Pt does not warrant home oxygen at this time.    10/14 S/p supplemental oxygen.  Currently in RA sating 93-95% - Maintain O2 88-92% for COPD.  - CXR noted to have b/l infiltrates with no evidence of consolidation  - 10/14 Repeat CXR- Neg for pulm disease     - Continue to monitory inflammatory markers(d-dimer, procalcitonin, LDH, ferritin, ESR, CRP) Q3days WDL

## 2022-10-19 NOTE — PROGRESS NOTE ADULT - PROBLEM SELECTOR PLAN 6
- Likely in the setting of CHF exacerbation in the setting of medication noncompliance   - At baseline patient ambulates with rolling walker  - Reports chronic LE swelling  - S/p LLE doppler negative DVT  - C/o chronic right hip pain, denies fall or trauma   -F/u ordered hip x-ray to r/o occult fx since hx of breast CA  - PT recommended AMY but family will take pt home.  Has a CDPAP  - reeval for home O2   - will set up home PT on discharge

## 2022-10-19 NOTE — PROGRESS NOTE ADULT - PROBLEM SELECTOR PLAN 6
- Likely in the setting of CHF exacerbation in the setting of medication noncompliance   - At baseline patient ambulates with rolling walker  - Reports chronic LE swelling  - S/p LLE doppler negative DVT  - C/o chronic right hip pain, denies fall or trauma   -F/u ordered hip x-ray to r/o occult fx since hx of breast CA  - PT recommended AMY but family will take pt home.  Has a CDPAP  - will set up home PT on discharge

## 2022-10-19 NOTE — PROGRESS NOTE ADULT - PROBLEM SELECTOR PLAN 1
- Patient was sent from PCP office for Hypoxia in the setting of SOB on exertion and b/l pitting edema,  - SpO2  84 % on RA in the ED, dyspneic on exertion.  S/p Lasix 40mg IVP x1 and Decadron 6mg IVPx1, and started on Remdesivir in the ED  - Known history of HFpEF  - Likely secondary to CHF exacerbation in the setting of medication noncompliance   - ProBNP 1763   - Trop 168  - CXR noted to have b/l infiltrates with no evidence of consolidation-findings consistent with pulmonary congestion  - 10/14 Repeat CXR-Neg for pulm disease  -completed remdesivir and steroids  - S/p LLE Doppler negative for DVT   - Lasix IV de-escalated to PO home dose.  C/w Lasix 40mg PO BID.     - C/w Albuterol & Singulair

## 2022-10-19 NOTE — PROGRESS NOTE ADULT - PROBLEM SELECTOR PLAN 11
- discussed with granddaughter Mirela at length - full code, encouraged her to have family discussion as she is declining and have comorbidity her home number : [phone]  [phone]

## 2022-10-19 NOTE — PROGRESS NOTE ADULT - SUBJECTIVE AND OBJECTIVE BOX
PATIENT SEEN AND EXAMINED ON :- 10/19/22  DATE OF SERVICE:     10/19/22        Interim events noted,Labs ,Radiological studies and Cardiology tests reviewed        HOSPITAL COURSE: HPI:  89F coming from home, ambulates with RW, with PMHx of COPD (not oh home o2), HFpEF (7/29/22 EF 55-60%, LVH, GIDD, mild pulmonary HTN), Afib on Eliquis, Breast CA, HTN, and with remote h/o LLE DVT sent from PCP office for Hypoxia, with associated SOB and YUAN x2d in the setting of medication noncompliance. According to granddaughter, Macario [phone] the patient has had SOB with YUAN for the past few days, which she attributes to not taking her Lasix for 5 days because patient ran out. Patient denies any associated chest pain or cough with SOB.  Patient also c/o chronic LE swelling, recently worsening. Also stating that she has right hip and right shoulder pain which she attributes to her arthritis. Per granddaughter, patient was admitted to [facility] in August for hypoxia and leg swelling, and treatment for heart failure, and discharged with an increased Lasix dose to 40mg BID. Per granddaughter, she ran out of Lasix after not going to her f/u PCP as the patient did not want want to go d/t clinical improvement. In the ED, patient found to be COVID positive, however denies any recent sick contact, as well as no fevers, chills, congestion and cough. Covid Vacc x3.  (10 Oct 2022 23:20)      INTERIM EVENTS:Patient seen at bedside ,interim events noted.      PMH -reviewed admission note, no change since admission  HEART FAILURE: Acute[ ]Chronic[ ] Systolic[ ] Diastolic[ ] Combined Systolic and Diastolic[ ]  CAD[ ] CABG[ ] PCI[ ]  DEVICES[ ] PPM[ ] ICD[ ] ILR[ ]  ATRIAL FIBRILLATION[ ] Paroxysmal[ ] Permanent[ ] CHADS2-[  ]  JACOBO[ ] CKD1[ ] CKD2[ ] CKD3[ ] CKD4[ ] ESRD[ ]  COPD[ ] HTN[ ]   DM[ ] Type1[ ] Type 2[ ]   CVA[ ] Paresis[ ]    AMBULATION: Assisted[ ] Cane/walker[ ] Independent[ ]    MEDICATIONS  (STANDING):  anastrozole 1 milliGRAM(s) Oral daily  apixaban 5 milliGRAM(s) Oral every 12 hours  artificial  tears Solution 1 Drop(s) Both EYES four times a day  aspirin  chewable 81 milliGRAM(s) Oral daily  carvedilol 6.25 milliGRAM(s) Oral every 12 hours  cholecalciferol 1000 Unit(s) Oral daily  dextrose 5%. 1000 milliLiter(s) (50 mL/Hr) IV Continuous <Continuous>  dextrose 5%. 1000 milliLiter(s) (100 mL/Hr) IV Continuous <Continuous>  dextrose 50% Injectable 12.5 Gram(s) IV Push once  dextrose 50% Injectable 25 Gram(s) IV Push once  dextrose 50% Injectable 25 Gram(s) IV Push once  ertapenem  IVPB 1000 milliGRAM(s) IV Intermittent every 24 hours  ferrous    sulfate 325 milliGRAM(s) Oral daily  furosemide    Tablet 40 milliGRAM(s) Oral two times a day  glucagon  Injectable 1 milliGRAM(s) IntraMuscular once  insulin lispro (ADMELOG) corrective regimen sliding scale   SubCutaneous three times a day before meals  lidocaine   4% Patch 1 Patch Transdermal daily  montelukast 10 milliGRAM(s) Oral daily  simvastatin 20 milliGRAM(s) Oral at bedtime    MEDICATIONS  (PRN):  acetaminophen     Tablet .. 650 milliGRAM(s) Oral every 6 hours PRN Temp greater or equal to 38C (100.4F), Moderate Pain (4 - 6)  ALBUTerol    90 MICROgram(s) HFA Inhaler 1 Puff(s) Inhalation every 6 hours PRN Shortness of Breath and/or Wheezing  dextrose Oral Gel 15 Gram(s) Oral once PRN Blood Glucose LESS THAN 70 milliGRAM(s)/deciliter  labetalol Injectable 10 milliGRAM(s) IV Push every 4 hours PRN If pt refuses PO BP med  melatonin 3 milliGRAM(s) Oral at bedtime PRN Insomnia            REVIEW OF SYSTEMS:  Constitutional: [ ] fever, [ ]weight loss,  [ ]fatigue [ ]weight gain  Eyes: [ ] visual changes  Respiratory: [ ]shortness of breath;  [ ] cough, [ ]wheezing, [ ]chills, [ ]hemoptysis  Cardiovascular: [ ] chest pain, [ ]palpitations, [ ]dizziness,  [ ]leg swelling[ ]orthopnea[ ]PND  Gastrointestinal: [ ] abdominal pain, [ ]nausea, [ ]vomiting,  [ ]diarrhea [ ]Constipation [ ]Melena  Genitourinary: [ ] dysuria, [ ] hematuria [ ][place]  Neurologic: [ ] headaches [ ] tremors[ ]weakness [ ]Paralysis Right[ ] Left[ ]  Skin: [ ] itching, [ ]burning, [ ] rashes  Endocrine: [ ] heat or cold intolerance  Musculoskeletal: [ ] joint pain or swelling; [ ] muscle, back, or extremity pain  Psychiatric: [ ] depression, [ ]anxiety, [ ]mood swings, or [ ]difficulty sleeping  Hematologic: [ ] easy bruising, [ ] bleeding gums    [ ] All remaining systems negative except as per above.   [ ]Unable to obtain.  [x] No change in ROS since admission      Vital Signs Last 24 Hrs  T(C): 36.8 (19 Oct 2022 13:04), Max: 36.9 (19 Oct 2022 06:36)  T(F): 98.2 (19 Oct 2022 13:04), Max: 98.4 (19 Oct 2022 06:36)  HR: 90 (19 Oct 2022 17:24) (68 - 90)  BP: 103/67 (19 Oct 2022 17:24) (101/78 - 123/83)  BP(mean): --  RR: 18 (19 Oct 2022 17:24) (16 - 18)  SpO2: 95% (19 Oct 2022 17:24) (95% - 99%)    Parameters below as of 19 Oct 2022 17:24  Patient On (Oxygen Delivery Method): room air      I&O's Summary      PHYSICAL EXAM:  General: No acute distress BMI-  HEENT: EOMI, PERRL  Neck: Supple, [ ] JVD  Lungs: Equal air entry bilaterally; [ ] rales [ ] wheezing [ ] rhonchi  Heart: Regular rate and rhythm; [x ] murmur   2/6 [ x] systolic [ ] diastolic [ ] radiation[ ] rubs [ ]  gallops  Abdomen: Nontender, bowel sounds present  Extremities: No clubbing, cyanosis, [ ] edema [ ]Pulses  equal and intact  Nervous system:  Alert & Oriented X3, no focal deficits  Psychiatric: Normal affect  Skin: No rashes or lesions    LABS:  10-19    140  |  95<L>  |  31<H>  ----------------------------<  131<H>  3.8   |  41<H>  |  1.07    Ca    9.1      19 Oct 2022 05:40  Phos  3.7     10-18  Mg     2.5     10-18    TPro  6.9  /  Alb  3.0<L>  /  TBili  1.5<H>  /  DBili  x   /  AST  13  /  ALT  17  /  AlkPhos  120  10-19    Creatinine Trend: 1.07<--, 1.14<--, 1.15<--, 1.03<--, 0.84<--, 0.95<--                        15.0   12.20 )-----------( 246      ( 19 Oct 2022 05:40 )             48.2     PT/INR - ( 19 Oct 2022 05:40 )   PT: 18.4 sec;   INR: 1.54 ratio

## 2022-10-19 NOTE — PROGRESS NOTE ADULT - PROBLEM SELECTOR PLAN 3
- History of COPD, not on home 02, takes trelegy Ellipta and albuterol prn   - Not currently in exacerbation, no wheezing on exam  - CXR noted to have b/l infiltrates with no evidence of consolidation, consistent with pulmonary congestion   - 10/14 Repeat CXR noted   - C/w Singulair and Albuterol prn  - Maintain O2 88-92% for COPD- will obtain ambulating O2 for home oxygen   - Unable to obtain ambulatory O2 deconditioned, PT recommended AMY.  However, resting and sitting oxygen saturation obtained an documented.  Pt does not warrant home oxygen at this time : will re-eval as pt requires PRN Oxygen

## 2022-10-19 NOTE — PROGRESS NOTE ADULT - SUBJECTIVE AND OBJECTIVE BOX
Patient was seen and examined  Patient is a 89y old  Female who presents with a chief complaint of SOB (18 Oct 2022 20:28)      INTERVAL HPI/OVERNIGHT EVENTS:  T(C): 36.9 (10-19-22 @ 06:36), Max: 36.9 (10-19-22 @ 06:36)  HR: 68 (10-19-22 @ 06:36) (68 - 96)  BP: 123/83 (10-19-22 @ 06:36) (101/78 - 123/83)  RR: 17 (10-19-22 @ 06:36) (16 - 18)  SpO2: 98% (10-19-22 @ 06:36) (94% - 100%)  Wt(kg): --  I&O's Summary      LABS:                        15.0   12.20 )-----------( 246      ( 19 Oct 2022 05:40 )             48.2     10-19    140  |  95<L>  |  31<H>  ----------------------------<  131<H>  3.8   |  41<H>  |  1.07    Ca    9.1      19 Oct 2022 05:40  Phos  3.7     10-18  Mg     2.5     10-18    TPro  6.9  /  Alb  3.0<L>  /  TBili  1.5<H>  /  DBili  x   /  AST  13  /  ALT  17  /  AlkPhos  120  10-19    PT/INR - ( 19 Oct 2022 05:40 )   PT: 18.4 sec;   INR: 1.54 ratio             CAPILLARY BLOOD GLUCOSE      POCT Blood Glucose.: 164 mg/dL (19 Oct 2022 07:35)  POCT Blood Glucose.: 171 mg/dL (18 Oct 2022 21:43)  POCT Blood Glucose.: 138 mg/dL (18 Oct 2022 17:15)  POCT Blood Glucose.: 185 mg/dL (18 Oct 2022 11:04)              MEDICATIONS  (STANDING):  anastrozole 1 milliGRAM(s) Oral daily  apixaban 5 milliGRAM(s) Oral every 12 hours  artificial  tears Solution 1 Drop(s) Both EYES four times a day  aspirin  chewable 81 milliGRAM(s) Oral daily  carvedilol 6.25 milliGRAM(s) Oral every 12 hours  cholecalciferol 1000 Unit(s) Oral daily  dextrose 5%. 1000 milliLiter(s) (100 mL/Hr) IV Continuous <Continuous>  dextrose 5%. 1000 milliLiter(s) (50 mL/Hr) IV Continuous <Continuous>  dextrose 50% Injectable 25 Gram(s) IV Push once  dextrose 50% Injectable 25 Gram(s) IV Push once  dextrose 50% Injectable 12.5 Gram(s) IV Push once  ertapenem  IVPB 1000 milliGRAM(s) IV Intermittent every 24 hours  ferrous    sulfate 325 milliGRAM(s) Oral daily  furosemide    Tablet 40 milliGRAM(s) Oral two times a day  glucagon  Injectable 1 milliGRAM(s) IntraMuscular once  insulin lispro (ADMELOG) corrective regimen sliding scale   SubCutaneous three times a day before meals  montelukast 10 milliGRAM(s) Oral daily  simvastatin 20 milliGRAM(s) Oral at bedtime    MEDICATIONS  (PRN):  acetaminophen     Tablet .. 650 milliGRAM(s) Oral every 6 hours PRN Temp greater or equal to 38C (100.4F), Moderate Pain (4 - 6)  ALBUTerol    90 MICROgram(s) HFA Inhaler 1 Puff(s) Inhalation every 6 hours PRN Shortness of Breath and/or Wheezing  dextrose Oral Gel 15 Gram(s) Oral once PRN Blood Glucose LESS THAN 70 milliGRAM(s)/deciliter  labetalol Injectable 10 milliGRAM(s) IV Push every 4 hours PRN If pt refuses PO BP med  melatonin 3 milliGRAM(s) Oral at bedtime PRN Insomnia      RADIOLOGY & ADDITIONAL TESTS:    Imaging Personally Reviewed:  [ ] YES  [ ] NO    REVIEW OF SYSTEMS:  CONSTITUTIONAL: No fever, weight loss, or fatigue  EYES: No eye pain, visual disturbances, or discharge  ENMT:  No difficulty hearing, tinnitus, vertigo; No sinus or throat pain  NECK: No pain or stiffness  BREASTS: No pain, masses, or nipple discharge  RESPIRATORY: No cough, wheezing, chills or hemoptysis; No shortness of breath  CARDIOVASCULAR: No chest pain, palpitations, dizziness, or leg swelling  GASTROINTESTINAL: No abdominal or epigastric pain. No nausea, vomiting, or hematemesis; No diarrhea or constipation. No melena or hematochezia.  GENITOURINARY: No dysuria, frequency, hematuria, or incontinence  NEUROLOGICAL: No headaches, memory loss, loss of strength, numbness, or tremors  SKIN: No itching, burning, rashes, or lesions   LYMPH NODES: No enlarged glands  ENDOCRINE: No heat or cold intolerance; No hair loss  MUSCULOSKELETAL: No joint pain or swelling; No muscle, back, or extremity pain  PSYCHIATRIC: No depression, anxiety, mood swings, or difficulty sleeping  HEME/LYMPH: No easy bruising, or bleeding gums  ALLERY AND IMMUNOLOGIC: No hives or eczema      Consultant(s) Notes Reviewed:  [ x ] YES  [ ] NO    PHYSICAL EXAM:  GENERAL: NAD, well-groomed, well-developed  HEAD:  Atraumatic, Normocephalic  EYES: EOMI, PERRLA, conjunctiva and sclera clear  ENMT: No tonsillar erythema, exudates, or enlargement; Moist mucous membranes, Good dentition, No lesions  NECK: Supple, No JVD, Normal thyroid  NERVOUS SYSTEM:  Alert & Oriented X3, Good concentration; Motor Strength 5/5 B/L upper and lower extremities; DTRs 2+ intact and symmetric  CHEST/LUNG: Clear to percussion bilaterally; No rales, rhonchi, wheezing, or rubs  HEART: Regular rate and rhythm; No murmurs, rubs, or gallops  ABDOMEN: Soft, Nontender, Nondistended; Bowel sounds present  EXTREMITIES:  2+ Peripheral Pulses, No clubbing, cyanosis, or edema  LYMPH: No lymphadenopathy noted  SKIN: No rashes or lesions    Care Discussed with Consultants/Other Providers [ x] YES  [ ] NO

## 2022-10-19 NOTE — PROGRESS NOTE ADULT - PROBLEM SELECTOR PLAN 11
- discussed with granddaughter Mirela at length - full code, encouraged her to have family discussion as she is declining and have comorbidity her home number : [phone]  [phone] - discussed with granddaughter Mirela at length - full code, encouraged her to have family discussion as she is declining and have comorbidity her home number : [phone]  [phone]

## 2022-10-19 NOTE — PROGRESS NOTE ADULT - SUBJECTIVE AND OBJECTIVE BOX
Patient is a 89y old  Female who presents with a chief complaint of SOB (19 Oct 2022 09:06)  Awake, alert, comfortable in bed in NAD. Remains on oxygen supp via NC  INTERVAL HPI/OVERNIGHT EVENTS:      VITAL SIGNS:  T(F): 98.4 (10-19-22 @ 06:36)  HR: 68 (10-19-22 @ 06:36)  BP: 123/83 (10-19-22 @ 06:36)  RR: 17 (10-19-22 @ 06:36)  SpO2: 98% (10-19-22 @ 06:36)  Wt(kg): --  I&O's Detail          REVIEW OF SYSTEMS:    CONSTITUTIONAL:  No fevers, chills, sweats    HEENT:  Eyes:  No diplopia or blurred vision. ENT:  No earache, sore throat or runny nose.    CARDIOVASCULAR:  No pressure, squeezing, tightness, or heaviness about the chest; no palpitations.    RESPIRATORY:  Per HPI    GASTROINTESTINAL:  No abdominal pain, nausea, vomiting or diarrhea.    GENITOURINARY:  No dysuria, frequency or urgency.    NEUROLOGIC:  No paresthesias, fasciculations, seizures or weakness.    PSYCHIATRIC:  No disorder of thought or mood.      PHYSICAL EXAM:    Constitutional: Well developed and nourished  Eyes:Perrla  ENMT: normal  Neck:supple  Respiratory: good air entry  Cardiovascular: S1 S2 regular  Gastrointestinal: Soft, Non tender  Extremities: No edema  Vascular:normal  Neurological:Awake, alert  Musculoskeletal:Normal      MEDICATIONS  (STANDING):  anastrozole 1 milliGRAM(s) Oral daily  apixaban 5 milliGRAM(s) Oral every 12 hours  artificial  tears Solution 1 Drop(s) Both EYES four times a day  aspirin  chewable 81 milliGRAM(s) Oral daily  carvedilol 6.25 milliGRAM(s) Oral every 12 hours  cholecalciferol 1000 Unit(s) Oral daily  dextrose 5%. 1000 milliLiter(s) (100 mL/Hr) IV Continuous <Continuous>  dextrose 5%. 1000 milliLiter(s) (50 mL/Hr) IV Continuous <Continuous>  dextrose 50% Injectable 25 Gram(s) IV Push once  dextrose 50% Injectable 25 Gram(s) IV Push once  dextrose 50% Injectable 12.5 Gram(s) IV Push once  ertapenem  IVPB 1000 milliGRAM(s) IV Intermittent every 24 hours  ferrous    sulfate 325 milliGRAM(s) Oral daily  furosemide    Tablet 40 milliGRAM(s) Oral two times a day  glucagon  Injectable 1 milliGRAM(s) IntraMuscular once  insulin lispro (ADMELOG) corrective regimen sliding scale   SubCutaneous three times a day before meals  montelukast 10 milliGRAM(s) Oral daily  simvastatin 20 milliGRAM(s) Oral at bedtime    MEDICATIONS  (PRN):  acetaminophen     Tablet .. 650 milliGRAM(s) Oral every 6 hours PRN Temp greater or equal to 38C (100.4F), Moderate Pain (4 - 6)  ALBUTerol    90 MICROgram(s) HFA Inhaler 1 Puff(s) Inhalation every 6 hours PRN Shortness of Breath and/or Wheezing  dextrose Oral Gel 15 Gram(s) Oral once PRN Blood Glucose LESS THAN 70 milliGRAM(s)/deciliter  labetalol Injectable 10 milliGRAM(s) IV Push every 4 hours PRN If pt refuses PO BP med  melatonin 3 milliGRAM(s) Oral at bedtime PRN Insomnia      Allergies    losartan (Angioedema)    Intolerances    Chicken (Stomach Upset)      LABS:                        15.0   12.20 )-----------( 246      ( 19 Oct 2022 05:40 )             48.2     10-19    140  |  95<L>  |  31<H>  ----------------------------<  131<H>  3.8   |  41<H>  |  1.07    Ca    9.1      19 Oct 2022 05:40  Phos  3.7     10-18  Mg     2.5     10-18    TPro  6.9  /  Alb  3.0<L>  /  TBili  1.5<H>  /  DBili  x   /  AST  13  /  ALT  17  /  AlkPhos  120  10-19    PT/INR - ( 19 Oct 2022 05:40 )   PT: 18.4 sec;   INR: 1.54 ratio                   CAPILLARY BLOOD GLUCOSE      POCT Blood Glucose.: 164 mg/dL (19 Oct 2022 07:35)  POCT Blood Glucose.: 171 mg/dL (18 Oct 2022 21:43)  POCT Blood Glucose.: 138 mg/dL (18 Oct 2022 17:15)    pro-bnp -- 10-16 @ 07:57     d-dimer <150  10-16 @ 07:57  pro-bnp -- 10-13 @ 05:41     d-dimer <150  10-13 @ 05:41      RADIOLOGY & ADDITIONAL TESTS:    CXR:    Ct scan chest:    ekg;    echo:

## 2022-10-20 ENCOUNTER — TRANSCRIPTION ENCOUNTER (OUTPATIENT)
Age: 87
End: 2022-10-20

## 2022-10-20 VITALS — HEART RATE: 87 BPM | RESPIRATION RATE: 18 BRPM | TEMPERATURE: 98 F | OXYGEN SATURATION: 96 %

## 2022-10-20 DIAGNOSIS — M19.90 UNSPECIFIED OSTEOARTHRITIS, UNSPECIFIED SITE: ICD-10-CM

## 2022-10-20 LAB
ANION GAP SERPL CALC-SCNC: 4 MMOL/L — LOW (ref 5–17)
BUN SERPL-MCNC: 28 MG/DL — HIGH (ref 7–18)
CALCIUM SERPL-MCNC: 9.2 MG/DL — SIGNIFICANT CHANGE UP (ref 8.4–10.5)
CHLORIDE SERPL-SCNC: 96 MMOL/L — SIGNIFICANT CHANGE UP (ref 96–108)
CO2 SERPL-SCNC: 39 MMOL/L — HIGH (ref 22–31)
CREAT SERPL-MCNC: 0.91 MG/DL — SIGNIFICANT CHANGE UP (ref 0.5–1.3)
EGFR: 60 ML/MIN/1.73M2 — SIGNIFICANT CHANGE UP
GLUCOSE BLDC GLUCOMTR-MCNC: 135 MG/DL — HIGH (ref 70–99)
GLUCOSE BLDC GLUCOMTR-MCNC: 138 MG/DL — HIGH (ref 70–99)
GLUCOSE SERPL-MCNC: 136 MG/DL — HIGH (ref 70–99)
HCT VFR BLD CALC: 48.2 % — HIGH (ref 34.5–45)
HGB BLD-MCNC: 15.3 G/DL — SIGNIFICANT CHANGE UP (ref 11.5–15.5)
MCHC RBC-ENTMCNC: 30.7 PG — SIGNIFICANT CHANGE UP (ref 27–34)
MCHC RBC-ENTMCNC: 31.7 GM/DL — LOW (ref 32–36)
MCV RBC AUTO: 96.6 FL — SIGNIFICANT CHANGE UP (ref 80–100)
NRBC # BLD: 0 /100 WBCS — SIGNIFICANT CHANGE UP (ref 0–0)
PLATELET # BLD AUTO: 237 K/UL — SIGNIFICANT CHANGE UP (ref 150–400)
POTASSIUM SERPL-MCNC: 4 MMOL/L — SIGNIFICANT CHANGE UP (ref 3.5–5.3)
POTASSIUM SERPL-SCNC: 4 MMOL/L — SIGNIFICANT CHANGE UP (ref 3.5–5.3)
RBC # BLD: 4.99 M/UL — SIGNIFICANT CHANGE UP (ref 3.8–5.2)
RBC # FLD: 13.2 % — SIGNIFICANT CHANGE UP (ref 10.3–14.5)
SODIUM SERPL-SCNC: 139 MMOL/L — SIGNIFICANT CHANGE UP (ref 135–145)
WBC # BLD: 10.11 K/UL — SIGNIFICANT CHANGE UP (ref 3.8–10.5)
WBC # FLD AUTO: 10.11 K/UL — SIGNIFICANT CHANGE UP (ref 3.8–10.5)

## 2022-10-20 PROCEDURE — 73502 X-RAY EXAM HIP UNI 2-3 VIEWS: CPT | Mod: 26,RT

## 2022-10-20 RX ORDER — SIMVASTATIN 20 MG/1
1 TABLET, FILM COATED ORAL
Qty: 30 | Refills: 0
Start: 2022-10-20 | End: 2022-11-18

## 2022-10-20 RX ORDER — CHOLECALCIFEROL (VITAMIN D3) 125 MCG
1 CAPSULE ORAL
Qty: 0 | Refills: 0 | DISCHARGE

## 2022-10-20 RX ORDER — ANASTROZOLE 1 MG/1
1 TABLET ORAL
Qty: 0 | Refills: 0 | DISCHARGE

## 2022-10-20 RX ORDER — CHOLECALCIFEROL (VITAMIN D3) 125 MCG
1 CAPSULE ORAL
Qty: 30 | Refills: 0
Start: 2022-10-20 | End: 2022-11-18

## 2022-10-20 RX ORDER — FUROSEMIDE 40 MG
1 TABLET ORAL
Qty: 60 | Refills: 0
Start: 2022-10-20 | End: 2022-11-18

## 2022-10-20 RX ORDER — ANASTROZOLE 1 MG/1
1 TABLET ORAL
Qty: 30 | Refills: 0
Start: 2022-10-20 | End: 2022-11-18

## 2022-10-20 RX ORDER — CARVEDILOL PHOSPHATE 80 MG/1
1 CAPSULE, EXTENDED RELEASE ORAL
Qty: 60 | Refills: 0
Start: 2022-10-20 | End: 2022-11-18

## 2022-10-20 RX ORDER — ALBUTEROL 90 UG/1
1 AEROSOL, METERED ORAL
Qty: 0 | Refills: 0 | DISCHARGE

## 2022-10-20 RX ORDER — ASPIRIN/CALCIUM CARB/MAGNESIUM 324 MG
1 TABLET ORAL
Qty: 0 | Refills: 0 | DISCHARGE

## 2022-10-20 RX ORDER — CARVEDILOL PHOSPHATE 80 MG/1
1 CAPSULE, EXTENDED RELEASE ORAL
Qty: 0 | Refills: 0 | DISCHARGE

## 2022-10-20 RX ORDER — APIXABAN 2.5 MG/1
1 TABLET, FILM COATED ORAL
Qty: 60 | Refills: 0
Start: 2022-10-20 | End: 2022-11-18

## 2022-10-20 RX ORDER — ALBUTEROL 90 UG/1
1 AEROSOL, METERED ORAL
Qty: 120 | Refills: 0
Start: 2022-10-20 | End: 2022-11-18

## 2022-10-20 RX ORDER — ASPIRIN/CALCIUM CARB/MAGNESIUM 324 MG
1 TABLET ORAL
Qty: 30 | Refills: 0
Start: 2022-10-20 | End: 2022-11-18

## 2022-10-20 RX ORDER — MONTELUKAST 4 MG/1
1 TABLET, CHEWABLE ORAL
Qty: 30 | Refills: 0
Start: 2022-10-20 | End: 2022-11-18

## 2022-10-20 RX ORDER — FERROUS SULFATE 325(65) MG
1 TABLET ORAL
Qty: 30 | Refills: 0
Start: 2022-10-20 | End: 2022-11-18

## 2022-10-20 RX ORDER — FERROUS SULFATE 325(65) MG
1 TABLET ORAL
Qty: 0 | Refills: 0 | DISCHARGE

## 2022-10-20 RX ORDER — LIDOCAINE 4 G/100G
1 CREAM TOPICAL
Qty: 30 | Refills: 0
Start: 2022-10-20 | End: 2022-11-18

## 2022-10-20 RX ORDER — MONTELUKAST 4 MG/1
1 TABLET, CHEWABLE ORAL
Qty: 0 | Refills: 0 | DISCHARGE

## 2022-10-20 RX ORDER — SIMVASTATIN 20 MG/1
1 TABLET, FILM COATED ORAL
Qty: 0 | Refills: 0 | DISCHARGE

## 2022-10-20 RX ORDER — FUROSEMIDE 40 MG
1 TABLET ORAL
Qty: 0 | Refills: 0 | DISCHARGE

## 2022-10-20 RX ADMIN — ERTAPENEM SODIUM 120 MILLIGRAM(S): 1 INJECTION, POWDER, LYOPHILIZED, FOR SOLUTION INTRAMUSCULAR; INTRAVENOUS at 06:10

## 2022-10-20 RX ADMIN — APIXABAN 5 MILLIGRAM(S): 2.5 TABLET, FILM COATED ORAL at 06:09

## 2022-10-20 RX ADMIN — CARVEDILOL PHOSPHATE 6.25 MILLIGRAM(S): 80 CAPSULE, EXTENDED RELEASE ORAL at 06:10

## 2022-10-20 RX ADMIN — Medication 1 DROP(S): at 06:10

## 2022-10-20 RX ADMIN — Medication 40 MILLIGRAM(S): at 13:38

## 2022-10-20 RX ADMIN — LIDOCAINE 1 PATCH: 4 CREAM TOPICAL at 10:56

## 2022-10-20 RX ADMIN — APIXABAN 5 MILLIGRAM(S): 2.5 TABLET, FILM COATED ORAL at 16:47

## 2022-10-20 RX ADMIN — Medication 1 DROP(S): at 10:56

## 2022-10-20 RX ADMIN — Medication 325 MILLIGRAM(S): at 10:55

## 2022-10-20 RX ADMIN — LIDOCAINE 1 PATCH: 4 CREAM TOPICAL at 16:47

## 2022-10-20 RX ADMIN — ANASTROZOLE 1 MILLIGRAM(S): 1 TABLET ORAL at 10:55

## 2022-10-20 RX ADMIN — LIDOCAINE 1 PATCH: 4 CREAM TOPICAL at 00:59

## 2022-10-20 RX ADMIN — MONTELUKAST 10 MILLIGRAM(S): 4 TABLET, CHEWABLE ORAL at 10:55

## 2022-10-20 RX ADMIN — Medication 40 MILLIGRAM(S): at 06:10

## 2022-10-20 RX ADMIN — CARVEDILOL PHOSPHATE 6.25 MILLIGRAM(S): 80 CAPSULE, EXTENDED RELEASE ORAL at 16:46

## 2022-10-20 RX ADMIN — Medication 1 DROP(S): at 16:46

## 2022-10-20 RX ADMIN — Medication 1000 UNIT(S): at 10:55

## 2022-10-20 RX ADMIN — Medication 81 MILLIGRAM(S): at 10:55

## 2022-10-20 NOTE — DISCHARGE NOTE NURSING/CASE MANAGEMENT/SOCIAL WORK - PATIENT PORTAL LINK FT
You can access the FollowMyHealth Patient Portal offered by [facility] by registering at the following website: [email or web address]. By joining [facility]’s FollowMyHealth portal, you will also be able to view your health information using other applications (apps) compatible with our system.

## 2022-10-20 NOTE — PROGRESS NOTE ADULT - PROVIDER SPECIALTY LIST ADULT
Infectious Disease
Internal Medicine
Internal Medicine
Infectious Disease
Internal Medicine
Internal Medicine
Infectious Disease
Internal Medicine
Cardiology
Internal Medicine
Internal Medicine
Pulmonology
Cardiology
Cardiology
Pulmonology
Cardiology
Internal Medicine
Pulmonology
Cardiology
Cardiology
Pulmonology
Cardiology
Pulmonology
Cardiology
Cardiology
Pulmonology
Pulmonology
Internal Medicine

## 2022-10-20 NOTE — PROGRESS NOTE ADULT - ASSESSMENT
Patient is a 89y  old female  coming from home, with PMHx of COPD (not oh home o2), HFpEF (7/29/22 EF 55-60%, LVH, GIDD, mild pulmonary HTN), Afib on Eliquis, Breast CA, HTN, and with remote h/o LLE DVT sent from PCP office for evaluation of Hypoxia, with associated SOB and YUAN x2d in the setting of medication noncompliance.  Patient has not taking her Lasix for last 5 days because she ran out  of Lasix. Per granddaughter, patient was admitted to [facility] in August for hypoxia and leg swelling, and treatment for heart failure, and discharged with an increased Lasix dose to 40mg BID.  On admission, she found to have no fever, but tachypnea, hypoxia to 84 % in Room air and positive COVID PCR. She has started on Remdesivir and Dexamethasone, and the ID consult requested to assist with further evaluation and antibiotic management.    # COVID Pneumonitis  # Acute Hypoxic Respiratory failure- on oxygen via NC  # The urine culture grew Klebsiella and Aerococcus in the setting of only 3 to 5 WBC in Urine analysis-  will benefit form treating ESBL  Klebsiella since became confused , sometimes mild component of UTI can worsens the confusion  # COVID VS Metabolic encephalitis - may benefit from treating the mild positive UA    would recommend:    1. Discontinue Ertapenem after Today's dose  2. Supportive care including AC  3. Aspiration and COVID precautions    Attending Attestation:    Spent more than 35 minutes on total encounter, more than 50 % of the visit was spent counseling and/or coordinating care by the Attending physician.

## 2022-10-20 NOTE — PROGRESS NOTE ADULT - REASON FOR ADMISSION
SOB

## 2022-10-20 NOTE — DISCHARGE NOTE NURSING/CASE MANAGEMENT/SOCIAL WORK - NSDCPEFALRISK_GEN_ALL_CORE
For information on Fall & Injury Prevention, visit: [email or web address] OR  [email or web address] OR  https://www.cdc.gov/steadi/patient.html

## 2022-10-20 NOTE — PROGRESS NOTE ADULT - SUBJECTIVE AND OBJECTIVE BOX
Patient is a 89y old  Female who presents with a chief complaint of SOB (19 Oct 2022 14:37)  Awake, alert, comfortable in BED in NAD. Complaining of pain on right tigh    INTERVAL HPI/OVERNIGHT EVENTS:      VITAL SIGNS:  T(F): 98.6 (10-20-22 @ 05:52)  HR: 69 (10-20-22 @ 05:52)  BP: 136/96 (10-20-22 @ 05:52)  RR: 18 (10-20-22 @ 05:52)  SpO2: 97% (10-20-22 @ 05:52)  Wt(kg): --  I&O's Detail    19 Oct 2022 07:01  -  20 Oct 2022 07:00  --------------------------------------------------------  IN:  Total IN: 0 mL    OUT:    Voided (mL): 200 mL  Total OUT: 200 mL    Total NET: -200 mL              REVIEW OF SYSTEMS:    CONSTITUTIONAL:  No fevers, chills, sweats    HEENT:  Eyes:  No diplopia or blurred vision. ENT:  No earache, sore throat or runny nose.    CARDIOVASCULAR:  No pressure, squeezing, tightness, or heaviness about the chest; no palpitations.    RESPIRATORY:  Per HPI    GASTROINTESTINAL:  No abdominal pain, nausea, vomiting or diarrhea.    GENITOURINARY:  No dysuria, frequency or urgency.    NEUROLOGIC:  No paresthesias, fasciculations, seizures or weakness.    PSYCHIATRIC:  No disorder of thought or mood.      PHYSICAL EXAM:    Constitutional: Well developed and nourished  Eyes:Perrla  ENMT: normal  Neck:supple  Respiratory: good air entry  Cardiovascular: S1 S2 regular  Gastrointestinal: Soft, Non tender  Extremities: No edema. + pain on right tigh  Vascular:normal  Neurological:Awake, alert,Ox3  Musculoskeletal:Normal      MEDICATIONS  (STANDING):  anastrozole 1 milliGRAM(s) Oral daily  apixaban 5 milliGRAM(s) Oral every 12 hours  artificial  tears Solution 1 Drop(s) Both EYES four times a day  aspirin  chewable 81 milliGRAM(s) Oral daily  carvedilol 6.25 milliGRAM(s) Oral every 12 hours  cholecalciferol 1000 Unit(s) Oral daily  dextrose 5%. 1000 milliLiter(s) (50 mL/Hr) IV Continuous <Continuous>  dextrose 5%. 1000 milliLiter(s) (100 mL/Hr) IV Continuous <Continuous>  dextrose 50% Injectable 25 Gram(s) IV Push once  dextrose 50% Injectable 12.5 Gram(s) IV Push once  dextrose 50% Injectable 25 Gram(s) IV Push once  ertapenem  IVPB 1000 milliGRAM(s) IV Intermittent every 24 hours  ferrous    sulfate 325 milliGRAM(s) Oral daily  furosemide    Tablet 40 milliGRAM(s) Oral two times a day  glucagon  Injectable 1 milliGRAM(s) IntraMuscular once  insulin lispro (ADMELOG) corrective regimen sliding scale   SubCutaneous three times a day before meals  lidocaine   4% Patch 1 Patch Transdermal daily  montelukast 10 milliGRAM(s) Oral daily  simvastatin 20 milliGRAM(s) Oral at bedtime    MEDICATIONS  (PRN):  acetaminophen     Tablet .. 650 milliGRAM(s) Oral every 6 hours PRN Temp greater or equal to 38C (100.4F), Moderate Pain (4 - 6)  ALBUTerol    90 MICROgram(s) HFA Inhaler 1 Puff(s) Inhalation every 6 hours PRN Shortness of Breath and/or Wheezing  dextrose Oral Gel 15 Gram(s) Oral once PRN Blood Glucose LESS THAN 70 milliGRAM(s)/deciliter  labetalol Injectable 10 milliGRAM(s) IV Push every 4 hours PRN If pt refuses PO BP med  melatonin 3 milliGRAM(s) Oral at bedtime PRN Insomnia      Allergies    losartan (Angioedema)    Intolerances    Chicken (Stomach Upset)      LABS:                        15.3   10.11 )-----------( 237      ( 20 Oct 2022 05:34 )             48.2     10-20    139  |  96  |  28<H>  ----------------------------<  136<H>  4.0   |  39<H>  |  0.91    Ca    9.2      20 Oct 2022 05:34    TPro  6.9  /  Alb  3.0<L>  /  TBili  1.5<H>  /  DBili  x   /  AST  13  /  ALT  17  /  AlkPhos  120  10-19    PT/INR - ( 19 Oct 2022 05:40 )   PT: 18.4 sec;   INR: 1.54 ratio                   CAPILLARY BLOOD GLUCOSE      POCT Blood Glucose.: 135 mg/dL (20 Oct 2022 08:08)  POCT Blood Glucose.: 146 mg/dL (19 Oct 2022 21:35)  POCT Blood Glucose.: 195 mg/dL (19 Oct 2022 17:17)  POCT Blood Glucose.: 150 mg/dL (19 Oct 2022 11:35)    pro-bnp -- 10-16 @ 07:57     d-dimer <150  10-16 @ 07:57      RADIOLOGY & ADDITIONAL TESTS:    < from: Xray Hip 2-3 Views, Right (10.20.22 @ 08:53) >  IMPRESSION:  Severe hypertrophic/degenerative OA, unchanged      < end of copied text >  < from: Xray Chest 1 View- PORTABLE-Routine (Xray Chest 1 View- PORTABLE-Routine in AM.) (10.14.22 @ 10:36) >  IMPRESSION:  No active pulmonary disease.      < end of copied text >  CXR:    Ct scan chest:    ekg;    echo:

## 2022-10-20 NOTE — DISCHARGE NOTE NURSING/CASE MANAGEMENT/SOCIAL WORK - NSDCVIVACCINE_GEN_ALL_CORE_FT
influenza, injectable, quadrivalent, preservative free; 27-Oct-2017 14:11; Brandy Joaquin (RN); [employer]; [id number]; IntraMuscular; Deltoid Left.; 0.5 milliLiter(s); VIS (VIS Published: 07-Aug-2015, VIS Presented: 27-Oct-2017);   influenza, high-dose, quadrivalent; 18-Nov-2021 06:24; Yemi Santana (WINTER); [employer]; [id number] (Exp. Date: 30-Jun-2022); IntraMuscular; Deltoid Left.; 0.7 milliLiter(s); VIS (VIS Published: 06-Aug-2021, VIS Presented: 18-Nov-2021);

## 2022-10-20 NOTE — PROGRESS NOTE ADULT - SUBJECTIVE AND OBJECTIVE BOX
Patient is seen and examined at the bed side, is afebrile. She is doing  much better, conversing over the phone.       REVIEW OF SYSTEMS: All other review systems are negative      ALLERGIES: Chicken (Stomach Upset)  losartan (Angioedema)      Vital Signs Last 24 Hrs  T(C): 36.7 (20 Oct 2022 13:06), Max: 37 (20 Oct 2022 05:52)  T(F): 98.1 (20 Oct 2022 13:06), Max: 98.6 (20 Oct 2022 05:52)  HR: 95 (20 Oct 2022 13:06) (69 - 95)  BP: 109/78 (20 Oct 2022 13:06) (103/67 - 136/96)  BP(mean): --  RR: 18 (20 Oct 2022 13:06) (16 - 18)  SpO2: 96% (20 Oct 2022 13:06) (95% - 98%)    Parameters below as of 20 Oct 2022 13:06  Patient On (Oxygen Delivery Method): room air        PHYSICAL EXAM:  GENERAL: Not in acute distress, off oxygen   CHEST/LUNG: Not using accessory muscles   CNS: Awake and more alert   Please refer to Primary team's note for rest of the systems      LABS:                         15.3   10.11 )-----------( 237      ( 20 Oct 2022 05:34 )             48.2                           15.0   12.20 )-----------( 246      ( 19 Oct 2022 05:40 )             48.2         10-20    139  |  96  |  28<H>  ----------------------------<  136<H>  4.0   |  39<H>  |  0.91    Ca    9.2      20 Oct 2022 05:34    TPro  6.9  /  Alb  3.0<L>  /  TBili  1.5<H>  /  DBili  x   /  AST  13  /  ALT  17  /  AlkPhos  120  10-19          10-19    140  |  95<L>  |  31<H>  ----------------------------<  131<H>  3.8   |  41<H>  |  1.07    Ca    9.1      19 Oct 2022 05:40  Phos  3.7     10-18  Mg     2.5     10-18    TPro  6.9  /  Alb  3.0<L>  /  TBili  1.5<H>  /  DBili  x   /  AST  13  /  ALT  17  /  AlkPhos  120  10-19    PT/INR - ( 11 Oct 2022 05:01 )   PT: 17.5 sec;   INR: 1.46 ratio      PTT - ( 10 Oct 2022 19:00 )  PTT:41.1 sec        CAPILLARY BLOOD GLUCOSE  POCT Blood Glucose.: 164 mg/dL (11 Oct 2022 12:06)  POCT Blood Glucose.: 163 mg/dL (11 Oct 2022 08:27)  POCT Blood Glucose.: 142 mg/dL (11 Oct 2022 02:15)        Urinalysis Basic - ( 11 Oct 2022 06:05 )  Color: Yellow / Appearance: Clear / S.005 / pH: x  Gluc: x / Ketone: Negative  / Bili: Negative / Urobili: Negative   Blood: x / Protein: Negative / Nitrite: Negative   Leuk Esterase: Negative / RBC: 0-2 /HPF / WBC 3-5 /HPF   Sq Epi: x / Non Sq Epi: Few /HPF / Bacteria: Trace /HPF        MEDICATIONS  (STANDING):    anastrozole 1 milliGRAM(s) Oral daily  apixaban 5 milliGRAM(s) Oral every 12 hours  artificial  tears Solution 1 Drop(s) Both EYES four times a day  aspirin  chewable 81 milliGRAM(s) Oral daily  carvedilol 6.25 milliGRAM(s) Oral every 12 hours  cholecalciferol 1000 Unit(s) Oral daily  ertapenem  IVPB 1000 milliGRAM(s) IV Intermittent every 24 hours  ferrous    sulfate 325 milliGRAM(s) Oral daily  furosemide    Tablet 40 milliGRAM(s) Oral two times a day  glucagon  Injectable 1 milliGRAM(s) IntraMuscular once  insulin lispro (ADMELOG) corrective regimen sliding scale   SubCutaneous three times a day before meals  lidocaine   4% Patch 1 Patch Transdermal daily  montelukast 10 milliGRAM(s) Oral daily  simvastatin 20 milliGRAM(s) Oral at bedtime      RADIOLOGY & ADDITIONAL TESTS:    10/10/22 : Xray Chest 1 View AP/PA (10.10.22 @ 18:43) IMPRESSION:   No radiographic evidence of active chest disease.      10/10/22 : US Duplex Venous Lower Ext Ltd, Left (10.10.22 @ 18:42) Limited study with limited compression secondary to swelling.  No obvious left lower extremity deep venous thrombosis.      MICROBIOLOGY DATA:    Culture - Urine (10.11.22 @ 06:05)   - Amikacin: S <=16   - Amoxicillin/Clavulanic Acid: S <=8/4   - Ampicillin: R >16 These ampicillin results predict results for amoxicillin   - Ampicillin/Sulbactam: I 16/8 Enterobacter, Klebsiella aerogenes, Citrobacter, and Serratia may develop resistance during prolonged therapy (3-4 days)   - Aztreonam: R >16   - Cefazolin: R >16 For uncomplicated UTI with K. pneumoniae, E. coli, or P. mirablis: NIDIA <=16 is sensitive and NIDIA >=32 is resistant. This also predicts results for oral agents cefaclor, cefdinir, cefpodoxime, cefprozil, cefuroxime axetil, cephalexin and locarbef for uncomplicated UTI. Note that some isolates may be susceptible to these agents while testing resistant to cefazolin.   - Cefepime: R >16   - Ceftriaxone: R >32 Enterobacter, Klebsiella aerogenes, Citrobacter, and Serratia may develop resistance during prolonged therapy   - Ciprofloxacin: S <=0.25   - Ertapenem: S <=0.5   - Gentamicin: S <=2   - Imipenem: S <=1   - Levofloxacin: R 2   - Meropenem: S <=1   - Nitrofurantoin: I 64 Should not be used to treat pyelonephritis   - Piperacillin/Tazobactam: S <=8   - Trimethoprim/Sulfamethoxazole: R >2/38   - Tigecycline: S <=2   - Tobramycin: S <=2   Specimen Source: Clean Catch Clean Catch (Midstream)   Culture Results:   50,000 - 99,000 CFU/mL Klebsiella pneumoniae ESBL   10,000 - 49,000 CFU/mL Aerococcus species   "Aerococcus spp. are predictably susceptible to penicillin,   ampicillin, tetracycline, and vancomycin. All isolates are   resistant to sulfonamides"   Organism Identification: Klebsiella pneumoniae ESBL   Culture - Urine (10.11.22 @ 06:05)   Specimen Source: Clean Catch Clean Catch (Midstream)   Culture Results: 50,000 - 99,000 CFU/mL Klebsiella pneumoniae   10,000 - 49,000 CFU/mL Aerococcus species   "Aerococcus spp. are predictably susceptible to penicillin,   ampicillin, tetracycline, and vancomycin. All isolates are   resistant to sulfonamides"     Urine Microscopic-Add On (NC) (10.11.22 @ 06:05)   Epithelial Cells: Few /HPF   Red Blood Cell - Urine: 0-2 /HPF   White Blood Cell - Urine: 3-5 /HPF   Bacteria: Trace /HPF     Flu With COVID-19 By PATRICK (10.10.22 @ 17:50)   SARS-CoV-2 Result: Detected:

## 2022-10-20 NOTE — PROGRESS NOTE ADULT - SUBJECTIVE AND OBJECTIVE BOX
PATIENT SEEN AND EXAMINED ON :- 10/20/22  DATE OF SERVICE:       10/20/22      Interim events noted,Labs ,Radiological studies and Cardiology tests reviewed        HOSPITAL COURSE: HPI:  89F coming from home, ambulates with RW, with PMHx of COPD (not oh home o2), HFpEF (7/29/22 EF 55-60%, LVH, GIDD, mild pulmonary HTN), Afib on Eliquis, Breast CA, HTN, and with remote h/o LLE DVT sent from PCP office for Hypoxia, with associated SOB and YUAN x2d in the setting of medication noncompliance. According to granddaughter, Macario [phone] the patient has had SOB with YUAN for the past few days, which she attributes to not taking her Lasix for 5 days because patient ran out. Patient denies any associated chest pain or cough with SOB.  Patient also c/o chronic LE swelling, recently worsening. Also stating that she has right hip and right shoulder pain which she attributes to her arthritis. Per granddaughter, patient was admitted to [facility] in August for hypoxia and leg swelling, and treatment for heart failure, and discharged with an increased Lasix dose to 40mg BID. Per granddaughter, she ran out of Lasix after not going to her f/u PCP as the patient did not want want to go d/t clinical improvement. In the ED, patient found to be COVID positive, however denies any recent sick contact, as well as no fevers, chills, congestion and cough. Covid Vacc x3.  (10 Oct 2022 23:20)      INTERIM EVENTS:Patient seen at bedside ,interim events noted.      PMH -reviewed admission note, no change since admission  HEART FAILURE: Acute[ ]Chronic[ ] Systolic[ ] Diastolic[ ] Combined Systolic and Diastolic[ ]  CAD[ ] CABG[ ] PCI[ ]  DEVICES[ ] PPM[ ] ICD[ ] ILR[ ]  ATRIAL FIBRILLATION[ ] Paroxysmal[ ] Permanent[ ] CHADS2-[  ]  JACOBO[ ] CKD1[ ] CKD2[ ] CKD3[ ] CKD4[ ] ESRD[ ]  COPD[ ] HTN[ ]   DM[ ] Type1[ ] Type 2[ ]   CVA[ ] Paresis[ ]    AMBULATION: Assisted[ ] Cane/walker[ ] Independent[ ]    MEDICATIONS  (STANDING):  anastrozole 1 milliGRAM(s) Oral daily  apixaban 5 milliGRAM(s) Oral every 12 hours  artificial  tears Solution 1 Drop(s) Both EYES four times a day  aspirin  chewable 81 milliGRAM(s) Oral daily  carvedilol 6.25 milliGRAM(s) Oral every 12 hours  cholecalciferol 1000 Unit(s) Oral daily  dextrose 5%. 1000 milliLiter(s) (100 mL/Hr) IV Continuous <Continuous>  dextrose 5%. 1000 milliLiter(s) (50 mL/Hr) IV Continuous <Continuous>  dextrose 50% Injectable 25 Gram(s) IV Push once  dextrose 50% Injectable 12.5 Gram(s) IV Push once  dextrose 50% Injectable 25 Gram(s) IV Push once  ertapenem  IVPB 1000 milliGRAM(s) IV Intermittent every 24 hours  ferrous    sulfate 325 milliGRAM(s) Oral daily  furosemide    Tablet 40 milliGRAM(s) Oral two times a day  glucagon  Injectable 1 milliGRAM(s) IntraMuscular once  insulin lispro (ADMELOG) corrective regimen sliding scale   SubCutaneous three times a day before meals  lidocaine   4% Patch 1 Patch Transdermal daily  montelukast 10 milliGRAM(s) Oral daily  simvastatin 20 milliGRAM(s) Oral at bedtime    MEDICATIONS  (PRN):  acetaminophen     Tablet .. 650 milliGRAM(s) Oral every 6 hours PRN Temp greater or equal to 38C (100.4F), Moderate Pain (4 - 6)  ALBUTerol    90 MICROgram(s) HFA Inhaler 1 Puff(s) Inhalation every 6 hours PRN Shortness of Breath and/or Wheezing  dextrose Oral Gel 15 Gram(s) Oral once PRN Blood Glucose LESS THAN 70 milliGRAM(s)/deciliter  labetalol Injectable 10 milliGRAM(s) IV Push every 4 hours PRN If pt refuses PO BP med  melatonin 3 milliGRAM(s) Oral at bedtime PRN Insomnia            REVIEW OF SYSTEMS:  Constitutional: [ ] fever, [ ]weight loss,  [ ]fatigue [ ]weight gain  Eyes: [ ] visual changes  Respiratory: [ ]shortness of breath;  [ ] cough, [ ]wheezing, [ ]chills, [ ]hemoptysis  Cardiovascular: [ ] chest pain, [ ]palpitations, [ ]dizziness,  [ ]leg swelling[ ]orthopnea[ ]PND  Gastrointestinal: [ ] abdominal pain, [ ]nausea, [ ]vomiting,  [ ]diarrhea [ ]Constipation [ ]Melena  Genitourinary: [ ] dysuria, [ ] hematuria [ ][place]  Neurologic: [ ] headaches [ ] tremors[ ]weakness [ ]Paralysis Right[ ] Left[ ]  Skin: [ ] itching, [ ]burning, [ ] rashes  Endocrine: [ ] heat or cold intolerance  Musculoskeletal: [ ] joint pain or swelling; [ ] muscle, back, or extremity pain  Psychiatric: [ ] depression, [ ]anxiety, [ ]mood swings, or [ ]difficulty sleeping  Hematologic: [ ] easy bruising, [ ] bleeding gums    [ ] All remaining systems negative except as per above.   [ ]Unable to obtain.  [x] No change in ROS since admission      Vital Signs Last 24 Hrs  T(C): 36.7 (20 Oct 2022 13:06), Max: 37 (20 Oct 2022 05:52)  T(F): 98.1 (20 Oct 2022 13:06), Max: 98.6 (20 Oct 2022 05:52)  HR: 95 (20 Oct 2022 13:06) (69 - 95)  BP: 109/78 (20 Oct 2022 13:06) (103/67 - 136/96)  BP(mean): --  RR: 18 (20 Oct 2022 13:06) (16 - 18)  SpO2: 96% (20 Oct 2022 13:06) (95% - 98%)    Parameters below as of 20 Oct 2022 13:06  Patient On (Oxygen Delivery Method): room air      I&O's Summary    19 Oct 2022 07:01  -  20 Oct 2022 07:00  --------------------------------------------------------  IN: 0 mL / OUT: 200 mL / NET: -200 mL        PHYSICAL EXAM:  General: No acute distress BMI-  HEENT: EOMI, PERRL  Neck: Supple, [ ] JVD  Lungs: Equal air entry bilaterally; [ ] rales [ ] wheezing [ ] rhonchi  Heart: Regular rate and rhythm; [x ] murmur   2/6 [ x] systolic [ ] diastolic [ ] radiation[ ] rubs [ ]  gallops  Abdomen: Nontender, bowel sounds present  Extremities: No clubbing, cyanosis, [ ] edema [ ]Pulses  equal and intact  Nervous system:  Alert & Oriented X3, no focal deficits  Psychiatric: Normal affect  Skin: No rashes or lesions    LABS:  10-20    139  |  96  |  28<H>  ----------------------------<  136<H>  4.0   |  39<H>  |  0.91    Ca    9.2      20 Oct 2022 05:34    TPro  6.9  /  Alb  3.0<L>  /  TBili  1.5<H>  /  DBili  x   /  AST  13  /  ALT  17  /  AlkPhos  120  10-19    Creatinine Trend: 0.91<--, 1.07<--, 1.14<--, 1.15<--, 1.03<--, 0.84<--                        15.3   10.11 )-----------( 237      ( 20 Oct 2022 05:34 )             48.2     PT/INR - ( 19 Oct 2022 05:40 )   PT: 18.4 sec;   INR: 1.54 ratio

## 2022-10-20 NOTE — PROGRESS NOTE ADULT - NUTRITIONAL ASSESSMENT
This patient has been assessed with a concern for Malnutrition and has been determined to have a diagnosis/diagnoses of Morbid obesity (BMI > 40).    This patient is being managed with:   Diet Regular-  Consistent Carbohydrate {No Snacks}  DASH/TLC {Sodium & Cholesterol Restricted}  Easy to Chew (EASYTOCHEW)  Egg Restricted  No Egg  No Poultry  No Shellfish  Supplement Feeding Modality:  Oral  Glucerna Shake Cans or Servings Per Day:  1       Frequency:  Two Times a day  Entered: Oct 17 2022 10:12AM    
This patient has been assessed with a concern for Malnutrition and has been determined to have a diagnosis/diagnoses of Morbid obesity (BMI > 40).    This patient is being managed with:   Diet Regular-  Consistent Carbohydrate {No Snacks}  DASH/TLC {Sodium & Cholesterol Restricted}  Easy to Chew (EASYTOCHEW)  Egg Restricted  No Egg  No Poultry  No Shellfish  Supplement Feeding Modality:  Oral  Glucerna Shake Cans or Servings Per Day:  1       Frequency:  Two Times a day  Entered: Oct 17 2022 10:12AM    Diet Regular-  DASH/TLC {Sodium & Cholesterol Restricted}  Egg Restricted  No Egg  No Poultry  No Shellfish  Entered: Oct 11 2022  4:15AM    The following pending diet order is being considered for treatment of Morbid obesity (BMI > 40):null
Diet, Regular:   DASH/TLC {Sodium & Cholesterol Restricted}  Egg Restricted  No Egg  No Poultry  No Shellfish (10-11-22 @ 04:15) [Active]
This patient has been assessed with a concern for Malnutrition and has been determined to have a diagnosis/diagnoses of Morbid obesity (BMI > 40).    This patient is being managed with:   Diet Regular-  Consistent Carbohydrate {No Snacks}  DASH/TLC {Sodium & Cholesterol Restricted}  Easy to Chew (EASYTOCHEW)  Egg Restricted  No Egg  No Poultry  No Shellfish  Supplement Feeding Modality:  Oral  Glucerna Shake Cans or Servings Per Day:  1       Frequency:  Two Times a day  Entered: Oct 17 2022 10:12AM    
Diet, Regular:   DASH/TLC {Sodium & Cholesterol Restricted}  Egg Restricted  No Egg  No Poultry  No Shellfish (10-11-22 @ 04:15) [Active]
Diet, Regular:   DASH/TLC {Sodium & Cholesterol Restricted}  Egg Restricted  No Egg  No Poultry  No Shellfish (10-11-22 @ 04:15) [Active]
This patient has been assessed with a concern for Malnutrition and has been determined to have a diagnosis/diagnoses of Morbid obesity (BMI > 40).    This patient is being managed with:   Diet Regular-  Consistent Carbohydrate {No Snacks}  DASH/TLC {Sodium & Cholesterol Restricted}  Easy to Chew (EASYTOCHEW)  Egg Restricted  No Egg  No Poultry  No Shellfish  Supplement Feeding Modality:  Oral  Glucerna Shake Cans or Servings Per Day:  1       Frequency:  Two Times a day  Entered: Oct 17 2022 10:12AM    Diet Regular-  DASH/TLC {Sodium & Cholesterol Restricted}  Egg Restricted  No Egg  No Poultry  No Shellfish  Entered: Oct 11 2022  4:15AM    The following pending diet order is being considered for treatment of Morbid obesity (BMI > 40):null
This patient has been assessed with a concern for Malnutrition and has been determined to have a diagnosis/diagnoses of Morbid obesity (BMI > 40).    This patient is being managed with:   Diet Regular-  Consistent Carbohydrate {No Snacks}  DASH/TLC {Sodium & Cholesterol Restricted}  Easy to Chew (EASYTOCHEW)  Egg Restricted  No Egg  No Poultry  No Shellfish  Supplement Feeding Modality:  Oral  Glucerna Shake Cans or Servings Per Day:  1       Frequency:  Two Times a day  Entered: Oct 17 2022 10:12AM    Diet Regular-  DASH/TLC {Sodium & Cholesterol Restricted}  Egg Restricted  No Egg  No Poultry  No Shellfish  Entered: Oct 11 2022  4:15AM    The following pending diet order is being considered for treatment of Morbid obesity (BMI > 40):null
Diet, Regular:   DASH/TLC {Sodium & Cholesterol Restricted}  Egg Restricted  No Egg  No Poultry  No Shellfish (10-11-22 @ 04:15) [Active]

## 2022-10-20 NOTE — PROGRESS NOTE ADULT - TIME BILLING
- Review of records, telemetry, vital signs and daily labs.   - General and cardiovascular physical examination.  - Generation of cardiovascular treatment plan.  - Coordination of care.      Patient was seen and examined by me on 10/19/22interim events noted,labs and radiology studies reviewed.  Chaparro Denton MD,FACC.  [address] [place], [place] [id number].  [phone]
- Review of records, telemetry, vital signs and daily labs.   - General and cardiovascular physical examination.  - Generation of cardiovascular treatment plan.  - Coordination of care.      Patient was seen and examined by me on 10/20/22interim events noted,labs and radiology studies reviewed.  Chaparro Denton MD,FACC.  [address] [place], [place] [id number].  [phone]
- Review of records, telemetry, vital signs and daily labs.   - General and cardiovascular physical examination.  - Generation of cardiovascular treatment plan.  - Coordination of care.      Patient was seen and examined by me on 10/13/22,interim events noted,labs and radiology studies reviewed.  Chaparro Denton MD,FACC.  [address] [place], [place] [id number].  [phone]
- Review of records, telemetry, vital signs and daily labs.   - General and cardiovascular physical examination.  - Generation of cardiovascular treatment plan.  - Coordination of care.      Patient was seen and examined by me on 10/16/22,interim events noted,labs and radiology studies reviewed.  Chaparro Denton MD,FACC.  [address] [place], [place] [id number].  [phone]
- Review of records, telemetry, vital signs and daily labs.   - General and cardiovascular physical examination.  - Generation of cardiovascular treatment plan.  - Coordination of care.      Patient was seen and examined by me on 10/12/22interim events noted,labs and radiology studies reviewed.  Chaparro Denton MD,FACC.  [address] [place], [place] [id number].  [phone]
- Review of records, telemetry, vital signs and daily labs.   - General and cardiovascular physical examination.  - Generation of cardiovascular treatment plan.  - Coordination of care.      Patient was seen and examined by me on 10/14/22,interim events noted,labs and radiology studies reviewed.  Chaparro Denton MD,FACC.  [address] [place], [place] [id number].  [phone]
- Review of records, telemetry, vital signs and daily labs.   - General and cardiovascular physical examination.  - Generation of cardiovascular treatment plan.  - Coordination of care.      Patient was seen and examined by me on 10/17/22,interim events noted,labs and radiology studies reviewed.  Chaparro Denton MD,FACC.  [address] [place], [place] [id number].  [phone]
- Review of records, telemetry, vital signs and daily labs.   - General and cardiovascular physical examination.  - Generation of cardiovascular treatment plan.  - Coordination of care.      Patient was seen and examined by me on 10/15/22,interim events noted,labs and radiology studies reviewed.  Chaparro Denton MD,FACC.  [address] [place], [place] [id number].  [phone]
- Review of records, telemetry, vital signs and daily labs.   - General and cardiovascular physical examination.  - Generation of cardiovascular treatment plan.  - Coordination of care.      Patient was seen and examined by me on 10/18/22,interim events noted,labs and radiology studies reviewed.  Chaparro Denton MD,FACC.  [address] [place], [place] [id number].  [phone]

## 2022-10-28 NOTE — PATIENT PROFILE ADULT - WILL THE PATIENT ACCEPT THE PFIZER COVID-19 VACCINE IF ELIGIBLE AND IT IS AVAILABLE?
10/28/2022  Ahmet Ro    Current provider:  Puma Andrade perfusionist on call.    7:40 AM      No

## 2022-11-08 PROCEDURE — 87637 SARSCOV2&INF A&B&RSV AMP PRB: CPT

## 2022-11-08 PROCEDURE — 97162 PT EVAL MOD COMPLEX 30 MIN: CPT

## 2022-11-08 PROCEDURE — 87186 SC STD MICRODIL/AGAR DIL: CPT

## 2022-11-08 PROCEDURE — 85610 PROTHROMBIN TIME: CPT

## 2022-11-08 PROCEDURE — 83735 ASSAY OF MAGNESIUM: CPT

## 2022-11-08 PROCEDURE — 87077 CULTURE AEROBIC IDENTIFY: CPT

## 2022-11-08 PROCEDURE — 85730 THROMBOPLASTIN TIME PARTIAL: CPT

## 2022-11-08 PROCEDURE — 93005 ELECTROCARDIOGRAM TRACING: CPT

## 2022-11-08 PROCEDURE — 93971 EXTREMITY STUDY: CPT

## 2022-11-08 PROCEDURE — 80076 HEPATIC FUNCTION PANEL: CPT

## 2022-11-08 PROCEDURE — 85379 FIBRIN DEGRADATION QUANT: CPT

## 2022-11-08 PROCEDURE — 80053 COMPREHEN METABOLIC PANEL: CPT

## 2022-11-08 PROCEDURE — 82306 VITAMIN D 25 HYDROXY: CPT

## 2022-11-08 PROCEDURE — 80048 BASIC METABOLIC PNL TOTAL CA: CPT

## 2022-11-08 PROCEDURE — 83615 LACTATE (LD) (LDH) ENZYME: CPT

## 2022-11-08 PROCEDURE — 85025 COMPLETE CBC W/AUTO DIFF WBC: CPT

## 2022-11-08 PROCEDURE — 84100 ASSAY OF PHOSPHORUS: CPT

## 2022-11-08 PROCEDURE — 83880 ASSAY OF NATRIURETIC PEPTIDE: CPT

## 2022-11-08 PROCEDURE — 87086 URINE CULTURE/COLONY COUNT: CPT

## 2022-11-08 PROCEDURE — 71045 X-RAY EXAM CHEST 1 VIEW: CPT

## 2022-11-08 PROCEDURE — 82728 ASSAY OF FERRITIN: CPT

## 2022-11-08 PROCEDURE — 86140 C-REACTIVE PROTEIN: CPT

## 2022-11-08 PROCEDURE — 84145 PROCALCITONIN (PCT): CPT

## 2022-11-08 PROCEDURE — 87635 SARS-COV-2 COVID-19 AMP PRB: CPT

## 2022-11-08 PROCEDURE — 85027 COMPLETE CBC AUTOMATED: CPT

## 2022-11-08 PROCEDURE — 36415 COLL VENOUS BLD VENIPUNCTURE: CPT

## 2022-11-08 PROCEDURE — 86769 SARS-COV-2 COVID-19 ANTIBODY: CPT

## 2022-11-08 PROCEDURE — 84484 ASSAY OF TROPONIN QUANT: CPT

## 2022-11-08 PROCEDURE — 99285 EMERGENCY DEPT VISIT HI MDM: CPT | Mod: 25

## 2022-11-08 PROCEDURE — 73502 X-RAY EXAM HIP UNI 2-3 VIEWS: CPT

## 2022-11-08 PROCEDURE — 83690 ASSAY OF LIPASE: CPT

## 2022-11-08 PROCEDURE — 81001 URINALYSIS AUTO W/SCOPE: CPT

## 2022-11-08 PROCEDURE — 82962 GLUCOSE BLOOD TEST: CPT

## 2023-01-11 ENCOUNTER — EMERGENCY (EMERGENCY)
Facility: HOSPITAL | Age: 88
LOS: 1 days | Discharge: HOME | End: 2023-01-11
Attending: EMERGENCY MEDICINE
Payer: MEDICARE

## 2023-01-11 VITALS
TEMPERATURE: 98 F | HEART RATE: 88 BPM | RESPIRATION RATE: 20 BRPM | OXYGEN SATURATION: 99 % | SYSTOLIC BLOOD PRESSURE: 113 MMHG | DIASTOLIC BLOOD PRESSURE: 90 MMHG

## 2023-01-11 LAB
ALBUMIN SERPL ELPH-MCNC: 3.5 G/DL — SIGNIFICANT CHANGE UP (ref 3.5–5)
ALP SERPL-CCNC: 119 U/L — SIGNIFICANT CHANGE UP (ref 40–120)
ALT FLD-CCNC: 30 U/L DA — SIGNIFICANT CHANGE UP (ref 10–60)
ANION GAP SERPL CALC-SCNC: 0 MMOL/L — LOW (ref 5–17)
AST SERPL-CCNC: 15 U/L — SIGNIFICANT CHANGE UP (ref 10–40)
BASOPHILS # BLD AUTO: 0.04 K/UL — SIGNIFICANT CHANGE UP (ref 0–0.2)
BASOPHILS NFR BLD AUTO: 0.6 % — SIGNIFICANT CHANGE UP (ref 0–2)
BILIRUB SERPL-MCNC: 0.8 MG/DL — SIGNIFICANT CHANGE UP (ref 0.2–1.2)
BUN SERPL-MCNC: 13 MG/DL — SIGNIFICANT CHANGE UP (ref 7–18)
CALCIUM SERPL-MCNC: 8.8 MG/DL — SIGNIFICANT CHANGE UP (ref 8.4–10.5)
CHLORIDE SERPL-SCNC: 96 MMOL/L — SIGNIFICANT CHANGE UP (ref 96–108)
CO2 SERPL-SCNC: 42 MMOL/L — HIGH (ref 22–31)
CREAT SERPL-MCNC: 1.08 MG/DL — SIGNIFICANT CHANGE UP (ref 0.5–1.3)
EGFR: 48 ML/MIN/1.73M2 — LOW
EOSINOPHIL # BLD AUTO: 0.36 K/UL — SIGNIFICANT CHANGE UP (ref 0–0.5)
EOSINOPHIL NFR BLD AUTO: 5.7 % — SIGNIFICANT CHANGE UP (ref 0–6)
GLUCOSE SERPL-MCNC: 154 MG/DL — HIGH (ref 70–99)
HCT VFR BLD CALC: 42.6 % — SIGNIFICANT CHANGE UP (ref 34.5–45)
HGB BLD-MCNC: 13.4 G/DL — SIGNIFICANT CHANGE UP (ref 11.5–15.5)
IMM GRANULOCYTES NFR BLD AUTO: 0.3 % — SIGNIFICANT CHANGE UP (ref 0–0.9)
LYMPHOCYTES # BLD AUTO: 1.47 K/UL — SIGNIFICANT CHANGE UP (ref 1–3.3)
LYMPHOCYTES # BLD AUTO: 23.4 % — SIGNIFICANT CHANGE UP (ref 13–44)
MAGNESIUM SERPL-MCNC: 2.2 MG/DL — SIGNIFICANT CHANGE UP (ref 1.6–2.6)
MCHC RBC-ENTMCNC: 31.5 GM/DL — LOW (ref 32–36)
MCHC RBC-ENTMCNC: 32.2 PG — SIGNIFICANT CHANGE UP (ref 27–34)
MCV RBC AUTO: 102.4 FL — HIGH (ref 80–100)
MONOCYTES # BLD AUTO: 0.47 K/UL — SIGNIFICANT CHANGE UP (ref 0–0.9)
MONOCYTES NFR BLD AUTO: 7.5 % — SIGNIFICANT CHANGE UP (ref 2–14)
NEUTROPHILS # BLD AUTO: 3.93 K/UL — SIGNIFICANT CHANGE UP (ref 1.8–7.4)
NEUTROPHILS NFR BLD AUTO: 62.5 % — SIGNIFICANT CHANGE UP (ref 43–77)
NRBC # BLD: 0 /100 WBCS — SIGNIFICANT CHANGE UP (ref 0–0)
NT-PROBNP SERPL-SCNC: 1943 PG/ML — HIGH (ref 0–450)
PLATELET # BLD AUTO: 279 K/UL — SIGNIFICANT CHANGE UP (ref 150–400)
POTASSIUM SERPL-MCNC: 4.2 MMOL/L — SIGNIFICANT CHANGE UP (ref 3.5–5.3)
POTASSIUM SERPL-SCNC: 4.2 MMOL/L — SIGNIFICANT CHANGE UP (ref 3.5–5.3)
PROT SERPL-MCNC: 7.2 G/DL — SIGNIFICANT CHANGE UP (ref 6–8.3)
RBC # BLD: 4.16 M/UL — SIGNIFICANT CHANGE UP (ref 3.8–5.2)
RBC # FLD: 14.5 % — SIGNIFICANT CHANGE UP (ref 10.3–14.5)
SODIUM SERPL-SCNC: 138 MMOL/L — SIGNIFICANT CHANGE UP (ref 135–145)
TROPONIN I, HIGH SENSITIVITY RESULT: 125.6 NG/L — HIGH
WBC # BLD: 6.29 K/UL — SIGNIFICANT CHANGE UP (ref 3.8–10.5)
WBC # FLD AUTO: 6.29 K/UL — SIGNIFICANT CHANGE UP (ref 3.8–10.5)

## 2023-01-11 PROCEDURE — 36415 COLL VENOUS BLD VENIPUNCTURE: CPT

## 2023-01-11 PROCEDURE — 83735 ASSAY OF MAGNESIUM: CPT

## 2023-01-11 PROCEDURE — 99285 EMERGENCY DEPT VISIT HI MDM: CPT | Mod: 25

## 2023-01-11 PROCEDURE — 99285 EMERGENCY DEPT VISIT HI MDM: CPT

## 2023-01-11 PROCEDURE — 83880 ASSAY OF NATRIURETIC PEPTIDE: CPT

## 2023-01-11 PROCEDURE — 85025 COMPLETE CBC W/AUTO DIFF WBC: CPT

## 2023-01-11 PROCEDURE — 84484 ASSAY OF TROPONIN QUANT: CPT

## 2023-01-11 PROCEDURE — 93005 ELECTROCARDIOGRAM TRACING: CPT

## 2023-01-11 PROCEDURE — 71045 X-RAY EXAM CHEST 1 VIEW: CPT

## 2023-01-11 PROCEDURE — 80053 COMPREHEN METABOLIC PANEL: CPT

## 2023-01-11 PROCEDURE — 71045 X-RAY EXAM CHEST 1 VIEW: CPT | Mod: 26

## 2023-01-11 RX ORDER — ACETAMINOPHEN 500 MG
650 TABLET ORAL ONCE
Refills: 0 | Status: COMPLETED | OUTPATIENT
Start: 2023-01-11 | End: 2023-01-11

## 2023-01-11 RX ADMIN — Medication 650 MILLIGRAM(S): at 20:20

## 2023-01-11 RX ADMIN — Medication 650 MILLIGRAM(S): at 19:49

## 2023-01-11 NOTE — ED PROVIDER NOTE - CARDIAC, MLM
Normal rate, regular rhythm.  Heart sounds S1, S2.  No murmurs, rubs or gallops. equal radial pulses 2 +

## 2023-01-11 NOTE — ED ADULT NURSE NOTE - NSFALLRSKOUTCOME_ED_ALL_ED
Addended by: ZAFAR KITCHEN on: 2/15/2022 08:52 AM     Modules accepted: Orders     Fall with Harm Risk

## 2023-01-11 NOTE — ED PROVIDER NOTE - CLINICAL SUMMARY MEDICAL DECISION MAKING FREE TEXT BOX
89F coming from home, ambulates with RW, with PMHx of COPD, HFpEF (7/29/22 EF 55-60%, LVH, GIDD, mild pulmonary HTN), Afib on Eliquis, Breast CA, HTN, and with remote h/o LLE DVT presents to ed c/o left anterior cp on and off since 1 day ago but now resolved. pt was at MD office when complain of sx. states it sometimes goes to shoulder. no fever, no n/v, no focal weakness, no syncope, no sob. occasional cough    cp likely msk r/o acs. lungs cta and no resp distress very unlikely to be ptx. pt without infectious sx. and clear lungs unlikely copd.

## 2023-01-11 NOTE — ED ADULT NURSE NOTE - OBJECTIVE STATEMENT
Pt c/o chest pain x yesterday. No acute distress noted, EKG completed, pt placed on cardiac monitor, denies chest pain, no SOB noted.

## 2023-01-11 NOTE — ED ADULT NURSE NOTE - ED STAT RN HANDOFF DETAILS
Report given to WINTER Avila. Pt resting in bed, no acute distress noted, denies chest pain, no SOB noted. Pt awaiting ambulance service.

## 2023-01-11 NOTE — ED ADULT NURSE NOTE - NSIMPLEMENTINTERV_GEN_ALL_ED
Implemented All Fall with Harm Risk Interventions:  [place] to call system. Call bell, personal items and telephone within reach. Instruct patient to call for assistance. Room bathroom lighting operational. Non-slip footwear when patient is off stretcher. Physically safe environment: no spills, clutter or unnecessary equipment. Stretcher in lowest position, wheels locked, appropriate side rails in place. Provide visual cue, wrist band, yellow gown, etc. Monitor gait and stability. Monitor for mental status changes and reorient to person, place, and time. Review medications for side effects contributing to fall risk. Reinforce activity limits and safety measures with patient and family. Provide visual clues: red socks.

## 2023-01-11 NOTE — ED PROVIDER NOTE - OBJECTIVE STATEMENT
89F coming from home, ambulates with RW, with PMHx of COPD, HFpEF (7/29/22 EF 55-60%, LVH, GIDD, mild pulmonary HTN), Afib on Eliquis, Breast CA, HTN, and with remote h/o LLE DVT presents to ed c/o left anterior cp on and off since 1 day ago but now resolved. pt was at MD office when complain of sx. states it sometimes goes to shoulder. no fever, no n/v, no focal weakness, no syncope, no sob. occasional cough

## 2023-01-11 NOTE — ED PROVIDER NOTE - PATIENT PORTAL LINK FT
You can access the FollowMyHealth Patient Portal offered by [facility] by registering at the following website: [email or web address]. By joining [employer]’s FollowMyHealth portal, you will also be able to view your health information using other applications (apps) compatible with our system.

## 2023-01-12 PROBLEM — C50.919 MALIGNANT NEOPLASM OF UNSPECIFIED SITE OF UNSPECIFIED FEMALE BREAST: Chronic | Status: ACTIVE | Noted: 2020-04-13

## 2023-01-12 PROBLEM — I82.409 ACUTE EMBOLISM AND THROMBOSIS OF UNSPECIFIED DEEP VEINS OF UNSPECIFIED LOWER EXTREMITY: Chronic | Status: ACTIVE | Noted: 2022-10-11

## 2023-01-12 PROBLEM — I10 ESSENTIAL (PRIMARY) HYPERTENSION: Chronic | Status: ACTIVE | Noted: 2022-10-11

## 2023-01-12 PROBLEM — I50.9 HEART FAILURE, UNSPECIFIED: Chronic | Status: ACTIVE | Noted: 2022-07-28

## 2023-01-30 ENCOUNTER — INPATIENT (INPATIENT)
Facility: HOSPITAL | Age: 88
LOS: 20 days | Discharge: HOME | DRG: 291 | End: 2023-02-20
Attending: INTERNAL MEDICINE | Admitting: INTERNAL MEDICINE
Payer: MEDICARE

## 2023-01-30 VITALS
OXYGEN SATURATION: 95 % | WEIGHT: 293 LBS | DIASTOLIC BLOOD PRESSURE: 94 MMHG | SYSTOLIC BLOOD PRESSURE: 134 MMHG | HEIGHT: 61 IN | TEMPERATURE: 98 F | HEART RATE: 93 BPM | RESPIRATION RATE: 21 BRPM

## 2023-01-30 DIAGNOSIS — Z86.718 PERSONAL HISTORY OF OTHER VENOUS THROMBOSIS AND EMBOLISM: ICD-10-CM

## 2023-01-30 DIAGNOSIS — Z29.9 ENCOUNTER FOR PROPHYLACTIC MEASURES, UNSPECIFIED: ICD-10-CM

## 2023-01-30 DIAGNOSIS — U07.1 COVID-19: ICD-10-CM

## 2023-01-30 DIAGNOSIS — I50.9 HEART FAILURE, UNSPECIFIED: ICD-10-CM

## 2023-01-30 DIAGNOSIS — N17.9 ACUTE KIDNEY FAILURE, UNSPECIFIED: ICD-10-CM

## 2023-01-30 DIAGNOSIS — J44.1 CHRONIC OBSTRUCTIVE PULMONARY DISEASE WITH (ACUTE) EXACERBATION: ICD-10-CM

## 2023-01-30 DIAGNOSIS — C50.919 MALIGNANT NEOPLASM OF UNSPECIFIED SITE OF UNSPECIFIED FEMALE BREAST: ICD-10-CM

## 2023-01-30 DIAGNOSIS — N39.0 URINARY TRACT INFECTION, SITE NOT SPECIFIED: ICD-10-CM

## 2023-01-30 DIAGNOSIS — I10 ESSENTIAL (PRIMARY) HYPERTENSION: ICD-10-CM

## 2023-01-30 DIAGNOSIS — J96.01 ACUTE RESPIRATORY FAILURE WITH HYPOXIA: ICD-10-CM

## 2023-01-30 DIAGNOSIS — E78.5 HYPERLIPIDEMIA, UNSPECIFIED: ICD-10-CM

## 2023-01-30 DIAGNOSIS — I48.91 UNSPECIFIED ATRIAL FIBRILLATION: ICD-10-CM

## 2023-01-30 DIAGNOSIS — E87.5 HYPERKALEMIA: ICD-10-CM

## 2023-01-30 LAB
ALBUMIN SERPL ELPH-MCNC: 3.2 G/DL — LOW (ref 3.5–5)
ALP SERPL-CCNC: 112 U/L — SIGNIFICANT CHANGE UP (ref 40–120)
ALT FLD-CCNC: 29 U/L DA — SIGNIFICANT CHANGE UP (ref 10–60)
ANION GAP SERPL CALC-SCNC: 5 MMOL/L — SIGNIFICANT CHANGE UP (ref 5–17)
APPEARANCE UR: ABNORMAL
APTT BLD: 34.5 SEC — SIGNIFICANT CHANGE UP (ref 27.5–35.5)
AST SERPL-CCNC: 41 U/L — HIGH (ref 10–40)
BACTERIA # UR AUTO: ABNORMAL /HPF
BASOPHILS # BLD AUTO: 0.04 K/UL — SIGNIFICANT CHANGE UP (ref 0–0.2)
BASOPHILS NFR BLD AUTO: 0.6 % — SIGNIFICANT CHANGE UP (ref 0–2)
BILIRUB SERPL-MCNC: 0.6 MG/DL — SIGNIFICANT CHANGE UP (ref 0.2–1.2)
BILIRUB UR-MCNC: NEGATIVE — SIGNIFICANT CHANGE UP
BUN SERPL-MCNC: 20 MG/DL — HIGH (ref 7–18)
CALCIUM SERPL-MCNC: 8.9 MG/DL — SIGNIFICANT CHANGE UP (ref 8.4–10.5)
CHLORIDE SERPL-SCNC: 92 MMOL/L — LOW (ref 96–108)
CO2 SERPL-SCNC: 37 MMOL/L — HIGH (ref 22–31)
COLOR SPEC: YELLOW — SIGNIFICANT CHANGE UP
CREAT SERPL-MCNC: 1.28 MG/DL — SIGNIFICANT CHANGE UP (ref 0.5–1.3)
DIFF PNL FLD: ABNORMAL
EGFR: 40 ML/MIN/1.73M2 — LOW
EOSINOPHIL # BLD AUTO: 0.39 K/UL — SIGNIFICANT CHANGE UP (ref 0–0.5)
EOSINOPHIL NFR BLD AUTO: 6.2 % — HIGH (ref 0–6)
EPI CELLS # UR: SIGNIFICANT CHANGE UP /HPF
FLUAV AG NPH QL: SIGNIFICANT CHANGE UP
FLUBV AG NPH QL: SIGNIFICANT CHANGE UP
GLUCOSE BLDC GLUCOMTR-MCNC: 176 MG/DL — HIGH (ref 70–99)
GLUCOSE SERPL-MCNC: 146 MG/DL — HIGH (ref 70–99)
GLUCOSE UR QL: NEGATIVE — SIGNIFICANT CHANGE UP
HCT VFR BLD CALC: 42.3 % — SIGNIFICANT CHANGE UP (ref 34.5–45)
HGB BLD-MCNC: 13.1 G/DL — SIGNIFICANT CHANGE UP (ref 11.5–15.5)
IMM GRANULOCYTES NFR BLD AUTO: 0.2 % — SIGNIFICANT CHANGE UP (ref 0–0.9)
INR BLD: 1.99 RATIO — HIGH (ref 0.88–1.16)
KETONES UR-MCNC: NEGATIVE — SIGNIFICANT CHANGE UP
LACTATE SERPL-SCNC: 0.9 MMOL/L — SIGNIFICANT CHANGE UP (ref 0.7–2)
LEUKOCYTE ESTERASE UR-ACNC: ABNORMAL
LYMPHOCYTES # BLD AUTO: 1.4 K/UL — SIGNIFICANT CHANGE UP (ref 1–3.3)
LYMPHOCYTES # BLD AUTO: 22.1 % — SIGNIFICANT CHANGE UP (ref 13–44)
MCHC RBC-ENTMCNC: 31 GM/DL — LOW (ref 32–36)
MCHC RBC-ENTMCNC: 31.5 PG — SIGNIFICANT CHANGE UP (ref 27–34)
MCV RBC AUTO: 101.7 FL — HIGH (ref 80–100)
MONOCYTES # BLD AUTO: 0.62 K/UL — SIGNIFICANT CHANGE UP (ref 0–0.9)
MONOCYTES NFR BLD AUTO: 9.8 % — SIGNIFICANT CHANGE UP (ref 2–14)
NEUTROPHILS # BLD AUTO: 3.88 K/UL — SIGNIFICANT CHANGE UP (ref 1.8–7.4)
NEUTROPHILS NFR BLD AUTO: 61.1 % — SIGNIFICANT CHANGE UP (ref 43–77)
NITRITE UR-MCNC: NEGATIVE — SIGNIFICANT CHANGE UP
NRBC # BLD: 0 /100 WBCS — SIGNIFICANT CHANGE UP (ref 0–0)
NT-PROBNP SERPL-SCNC: 3916 PG/ML — HIGH (ref 0–450)
PH UR: 5 — SIGNIFICANT CHANGE UP (ref 5–8)
PLATELET # BLD AUTO: 242 K/UL — SIGNIFICANT CHANGE UP (ref 150–400)
POTASSIUM SERPL-MCNC: 5.6 MMOL/L — HIGH (ref 3.5–5.3)
POTASSIUM SERPL-SCNC: 5.6 MMOL/L — HIGH (ref 3.5–5.3)
PROT SERPL-MCNC: 7.5 G/DL — SIGNIFICANT CHANGE UP (ref 6–8.3)
PROT UR-MCNC: 30 MG/DL
PROTHROM AB SERPL-ACNC: 23.8 SEC — HIGH (ref 10.5–13.4)
RBC # BLD: 4.16 M/UL — SIGNIFICANT CHANGE UP (ref 3.8–5.2)
RBC # FLD: 14 % — SIGNIFICANT CHANGE UP (ref 10.3–14.5)
RBC CASTS # UR COMP ASSIST: ABNORMAL /HPF (ref 0–2)
SARS-COV-2 RNA SPEC QL NAA+PROBE: SIGNIFICANT CHANGE UP
SODIUM SERPL-SCNC: 134 MMOL/L — LOW (ref 135–145)
SP GR SPEC: 1.01 — SIGNIFICANT CHANGE UP (ref 1.01–1.02)
TROPONIN I, HIGH SENSITIVITY RESULT: 110.9 NG/L — HIGH
UROBILINOGEN FLD QL: NEGATIVE — SIGNIFICANT CHANGE UP
WBC # BLD: 6.34 K/UL — SIGNIFICANT CHANGE UP (ref 3.8–10.5)
WBC # FLD AUTO: 6.34 K/UL — SIGNIFICANT CHANGE UP (ref 3.8–10.5)
WBC UR QL: >50 /HPF (ref 0–5)

## 2023-01-30 PROCEDURE — 71045 X-RAY EXAM CHEST 1 VIEW: CPT | Mod: 26

## 2023-01-30 PROCEDURE — 93010 ELECTROCARDIOGRAM REPORT: CPT

## 2023-01-30 PROCEDURE — 99285 EMERGENCY DEPT VISIT HI MDM: CPT

## 2023-01-30 RX ORDER — POLYETHYLENE GLYCOL 3350 17 G/17G
17 POWDER, FOR SOLUTION ORAL DAILY
Refills: 0 | Status: DISCONTINUED | OUTPATIENT
Start: 2023-01-30 | End: 2023-02-03

## 2023-01-30 RX ORDER — SIMVASTATIN 20 MG/1
0 TABLET, FILM COATED ORAL
Qty: 0 | Refills: 0 | DISCHARGE

## 2023-01-30 RX ORDER — FUROSEMIDE 40 MG
0 TABLET ORAL
Qty: 0 | Refills: 0 | DISCHARGE

## 2023-01-30 RX ORDER — FUROSEMIDE 40 MG
40 TABLET ORAL DAILY
Refills: 0 | Status: DISCONTINUED | OUTPATIENT
Start: 2023-01-31 | End: 2023-02-02

## 2023-01-30 RX ORDER — APIXABAN 2.5 MG/1
0 TABLET, FILM COATED ORAL
Qty: 0 | Refills: 0 | DISCHARGE

## 2023-01-30 RX ORDER — ANASTROZOLE 1 MG/1
0 TABLET ORAL
Qty: 0 | Refills: 0 | DISCHARGE

## 2023-01-30 RX ORDER — SIMVASTATIN 20 MG/1
20 TABLET, FILM COATED ORAL AT BEDTIME
Refills: 0 | Status: DISCONTINUED | OUTPATIENT
Start: 2023-01-30 | End: 2023-02-03

## 2023-01-30 RX ORDER — CARVEDILOL PHOSPHATE 80 MG/1
0 CAPSULE, EXTENDED RELEASE ORAL
Qty: 0 | Refills: 0 | DISCHARGE

## 2023-01-30 RX ORDER — IPRATROPIUM/ALBUTEROL SULFATE 18-103MCG
3 AEROSOL WITH ADAPTER (GRAM) INHALATION EVERY 6 HOURS
Refills: 0 | Status: DISCONTINUED | OUTPATIENT
Start: 2023-01-30 | End: 2023-02-20

## 2023-01-30 RX ORDER — MONTELUKAST 4 MG/1
0 TABLET, CHEWABLE ORAL
Qty: 0 | Refills: 0 | DISCHARGE

## 2023-01-30 RX ORDER — DEXTROSE 50 % IN WATER 50 %
50 SYRINGE (ML) INTRAVENOUS ONCE
Refills: 0 | Status: COMPLETED | OUTPATIENT
Start: 2023-01-30 | End: 2023-01-30

## 2023-01-30 RX ORDER — ACETAMINOPHEN 500 MG
650 TABLET ORAL EVERY 6 HOURS
Refills: 0 | Status: DISCONTINUED | OUTPATIENT
Start: 2023-01-30 | End: 2023-02-03

## 2023-01-30 RX ORDER — APIXABAN 2.5 MG/1
5 TABLET, FILM COATED ORAL
Refills: 0 | Status: DISCONTINUED | OUTPATIENT
Start: 2023-01-30 | End: 2023-02-03

## 2023-01-30 RX ORDER — INSULIN HUMAN 100 [IU]/ML
5 INJECTION, SOLUTION SUBCUTANEOUS ONCE
Refills: 0 | Status: COMPLETED | OUTPATIENT
Start: 2023-01-30 | End: 2023-01-30

## 2023-01-30 RX ORDER — LANOLIN ALCOHOL/MO/W.PET/CERES
3 CREAM (GRAM) TOPICAL AT BEDTIME
Refills: 0 | Status: DISCONTINUED | OUTPATIENT
Start: 2023-01-30 | End: 2023-02-03

## 2023-01-30 RX ORDER — CEFTRIAXONE 500 MG/1
1000 INJECTION, POWDER, FOR SOLUTION INTRAMUSCULAR; INTRAVENOUS EVERY 24 HOURS
Refills: 0 | Status: COMPLETED | OUTPATIENT
Start: 2023-01-30 | End: 2023-02-02

## 2023-01-30 RX ORDER — ANASTROZOLE 1 MG/1
1 TABLET ORAL DAILY
Refills: 0 | Status: DISCONTINUED | OUTPATIENT
Start: 2023-01-30 | End: 2023-02-20

## 2023-01-30 RX ORDER — FERROUS SULFATE 325(65) MG
325 TABLET ORAL DAILY
Refills: 0 | Status: DISCONTINUED | OUTPATIENT
Start: 2023-01-30 | End: 2023-01-30

## 2023-01-30 RX ORDER — MONTELUKAST 4 MG/1
10 TABLET, CHEWABLE ORAL DAILY
Refills: 0 | Status: DISCONTINUED | OUTPATIENT
Start: 2023-01-30 | End: 2023-02-03

## 2023-01-30 RX ORDER — SENNA PLUS 8.6 MG/1
2 TABLET ORAL AT BEDTIME
Refills: 0 | Status: DISCONTINUED | OUTPATIENT
Start: 2023-01-30 | End: 2023-02-03

## 2023-01-30 RX ORDER — CARVEDILOL PHOSPHATE 80 MG/1
6.25 CAPSULE, EXTENDED RELEASE ORAL EVERY 12 HOURS
Refills: 0 | Status: DISCONTINUED | OUTPATIENT
Start: 2023-01-30 | End: 2023-02-20

## 2023-01-30 RX ORDER — FUROSEMIDE 40 MG
40 TABLET ORAL ONCE
Refills: 0 | Status: COMPLETED | OUTPATIENT
Start: 2023-01-30 | End: 2023-01-30

## 2023-01-30 RX ADMIN — Medication 50 MILLILITER(S): at 23:55

## 2023-01-30 RX ADMIN — INSULIN HUMAN 5 UNIT(S): 100 INJECTION, SOLUTION SUBCUTANEOUS at 23:55

## 2023-01-30 RX ADMIN — Medication 40 MILLIGRAM(S): at 20:12

## 2023-01-30 RX ADMIN — Medication 125 MILLIGRAM(S): at 20:11

## 2023-01-30 NOTE — H&P ADULT - PROBLEM SELECTOR PLAN 1
pt p/w cough, SOB, wheezing and hypoxia   CXR with interstitial infiltrates (wet read)   pt a pt p/w cough, SOB, wheezing and hypoxia   CXR with interstitial infiltrates (wet read)   pt w/ b/l edema   improved s/p benulizer en route and IV lasix and steroids in ED  supplement O2, titrate as tolerated   treat for CHF and copd as below

## 2023-01-30 NOTE — ED ADULT NURSE NOTE - IS THE PATIENT ABLE TO BE SCREENED?
History of Present Illness:  Vnace Hook is a [de-identified]Yes

## 2023-01-30 NOTE — H&P ADULT - NSICDXPASTMEDICALHX_GEN_ALL_CORE_FT
PAST MEDICAL HISTORY:  Arthritis     Atrial fibrillation     Breast cancer right, no chemo or radiation    COPD exacerbation     Deep vein thrombosis (DVT) Left Lower Extremity    H/O CHF     HF (heart failure) HFpEF 7/29    HTN (hypertension)     Legally blind     Pre-diabetes

## 2023-01-30 NOTE — ED PROVIDER NOTE - CLINICAL SUMMARY MEDICAL DECISION MAKING FREE TEXT BOX
89 year old female with past medical history of hypertension, CHF, and COPD presents to the ED with complaints of productive cough, shortness of breath, and wheezing for 2 weeks. Suspect COPD. Will obtain chest x-ray, labs, COVID swab, and reassess.

## 2023-01-30 NOTE — H&P ADULT - NSHPSOCIALHISTORY_GEN_ALL_CORE
Pt from home, ambulates with rolling walker. Denies hx of smoking, EtOH or recreational drug use. Pt from home, ambulates with rolling walker at home and with a wheelchair when out of the house. Denies hx of smoking, EtOH or recreational drug use.

## 2023-01-30 NOTE — ED PROVIDER NOTE - OBJECTIVE STATEMENT
89 year old female with past medical history of hypertension, CHF, and COPD presents to the ED with complaints of productive cough, shortness of breath, and wheezing for 2 weeks. The patient states that she saw her PMD Dr. Sosa who sent her here for admission. The patient's daughter states that her oxygen can go down to the 70s but that the patient has never required oxygen at home. The patient denies any other symptoms including fever, chest pain, and leg swelling.   Allergies: Losartan- angioedema 89 year old female with past medical history of hypertension, CHF, and COPD presents to the ED with complaints of productive cough, shortness of breath, and wheezing for 2 weeks. The patient states that she saw her PMD Dr. Bianchi who sent her here for admission. The patient's daughter states that her oxygen can go down to the 70s but that the patient has never required oxygen at home. The patient denies any other symptoms including fever, chest pain, and leg swelling.   Allergies: Losartan- angioedema

## 2023-01-30 NOTE — H&P ADULT - PROBLEM SELECTOR PLAN 3
pt also with inc sputum and wheezing  s/p 125mg solumedrol in ED with improvement  started on standing nebulizers  c/w 40mg PO prednisone for 5 days pt also with inc sputum and wheezing  s/p 125mg solumedrol in ED with improvement  started on standing nebulizers  c/w 40mg PO prednisone for 5 days  Pulm Huy consulted

## 2023-01-30 NOTE — ED ADULT TRIAGE NOTE - CHIEF COMPLAINT QUOTE
patient sent by PCP for admission. as per daughter patient hx of COPD not on O2 at home complaining of shortness of breath and cough. patient was given 1 duoneb by pcp and 2 by emt.

## 2023-01-30 NOTE — H&P ADULT - PROBLEM SELECTOR PLAN 5
EKG:   c/w eliquis and carvedilol (home meds) K+ 5.6 on admission  EKG:   s/p lasix in ED  Will give 5u insulin and d5  monitor BMP

## 2023-01-30 NOTE — H&P ADULT - ASSESSMENT
89F with PMHx HTN, COPD, Afib, HFpEF (7/29/22 EF 55-60%, LVH, GIDD, Breast CA, and remote h/o LLE DVT p/w SOB and hypoxia. Admitted to medicine for AHRF 2/2 CHF exacerbation. 89F with PMHx HTN, COPD, Afib, HFpEF (7/29/22 EF 55-60%, LVH, GIDD, Breast CA, and remote h/o LLE DVT p/w SOB and hypoxia. Admitted to medicine for AHRF 2/2 acute CHF +/- COPD exacerbation and UTI.

## 2023-01-30 NOTE — H&P ADULT - PROBLEM SELECTOR PLAN 2
CXR with interstitial infiltrates, pt with significant edema on exam  Echo 7/22: EF wnl, GIDD  s/p 40 IV lasix in ED with improvement   c/w 40mg IV lasix qd (equivalent to home PO dose)   consider increasing to 40bid  Cardio _____ consulted CXR with interstitial infiltrates, pt with significant edema on exam  Echo 7/22: EF wnl, GIDD  s/p 40 IV lasix in ED with improvement   c/w 40mg IV lasix qd (equivalent to home PO dose)   consider increasing to 40bid  long placed in ED  strict Is and Os   Cardio _____ consulted CXR with interstitial infiltrates, pt with significant edema on exam  Echo 7/22: EF wnl, GIDD  s/p 40 IV lasix in ED with improvement   c/w 40mg IV lasix qd (equivalent to home PO dose)   consider increasing to 40bid  long placed in ED  strict Is and Os   Cardio Nabatian consulted

## 2023-01-30 NOTE — ED PROVIDER NOTE - NSICDXPASTMEDICALHX_GEN_ALL_CORE_FT
PAST MEDICAL HISTORY:  Arthritis     Atrial fibrillation     Breast cancer right, no chemo or radiation    COPD exacerbation     Deep vein thrombosis (DVT) Left Lower Extremity    HF (heart failure) HFpEF 7/29    HTN (hypertension)     Legally blind     Pre-diabetes       H/O CHF

## 2023-01-30 NOTE — H&P ADULT - PROBLEM SELECTOR PLAN 4
K+ 5.6 on admission  EKG:   s/p 5u insulin and d5  monitor BMP UA grossly +  pt denies dysuria  started on ceftriaxone UA grossly +  pt denies dysuria  started on ceftriaxone  ID Denton consulted

## 2023-01-30 NOTE — H&P ADULT - NSHPPHYSICALEXAM_GEN_ALL_CORE
T(C): 36.7 (01-30-23 @ 20:09), Max: 36.8 (01-30-23 @ 16:23)  HR: 72 (01-30-23 @ 20:09) (72 - 93)  BP: 134/87 (01-30-23 @ 20:09) (134/87 - 134/94)  RR: 20 (01-30-23 @ 20:09) (20 - 21)  SpO2: 95% (01-30-23 @ 20:09) (95% - 95%)    GENERAL: Obese female, in NAD, on 2L NC   HEAD: normocephalic, atraumatic  EYES: sclera clear, no exudates  ENMT: oropharynx clear without erythema, no exudates, moist mucous membranes  NECK: supple, soft, no thyromegaly noted  LUNGS: + b/l crackles R>L, +L sided wheezing  HEART: S1/S2, regular rate and rhythm, no murmurs noted, 3+ lower extremity edema to ankles, 2+ to midcalf  GASTROINTESTINAL: abdomen is soft, nondistended, nontender, normoactive bowel sounds, no palpable masses  INTEGUMENT: good skin turgor, lesions as noted   MUSCULOSKELETAL: no clubbing or cyanosis, no obvious deformity  NEUROLOGIC: Alert and oriented, CNII-XII intact, no obvious sensorimotor deficits noted

## 2023-01-30 NOTE — ED PROVIDER NOTE - IV ALTEPLASE INCLUSION HIDDEN
Discharge Summary     Jaden Dia  : 2001  MRN: [id number]  CSN: [id number]    Date of Admission: 10/13/2021   Date of Discharge:  10/16/2021   Delivering Physician: Neela Gonzalez        Admission Diagnosis: Pregnancy [Z34.90]   Discharge Diagnosis: 1. Same as above plus  2. Pregnancy at 39w0d - delivered       Procedures: 10/14/2021  - Vaginal, Spontaneous       Hospital Course  Patient is a 20 y.o.  who at 39w0d had a vaginal birth.    Her postpartum course was without complications.    She reports she is doing well.  She is tolerating a regular diet.    Reports ambulating, voiding and passing gas.   She had normal lochia.  pain well controlled with oral medications.    Baby is doing well          Infant  male  fetus weighing 3317 g (7 lb 5 oz)   Apgars -  8 @ 1 minute /  9 @ 5 minutes.    Discharge labs  Lab Results   Component Value Date    WBC 14.55 (H) 10/15/2021    HGB 10.8 (L) 10/15/2021    HCT 33.5 (L) 10/15/2021     10/15/2021       Discharge Medications     Discharge Medications      New Medications      Instructions Start Date   acetaminophen 500 MG tablet  Commonly known as: TYLENOL   1,000 mg, Oral, Every 8 Hours, Alternate with ibuprofen      docusate sodium 100 MG capsule  Commonly known as: Colace   100 mg, Oral, 2 Times Daily      ibuprofen 800 MG tablet  Commonly known as: ADVIL,MOTRIN   800 mg, Oral, Every 8 Hours PRN         Continue These Medications      Instructions Start Date   FeroSul 325 (65 FE) MG tablet  Generic drug: ferrous sulfate   325 mg, Oral, 2 Times Daily Before Meals      prenatal vitamin 27-0.8 27-0.8 MG tablet tablet   1 tablet, Oral, Daily         Stop These Medications    doxylamine 25 MG tablet  Commonly known as: UNISOM     Famotidine Maximum Strength 20 MG tablet  Generic drug: famotidine     vitamin B-6 25 MG tablet  Commonly known as: PYRIDOXINE            Discharge Disposition Home or Self Care   Condition on Discharge: good    Follow-up: 6 weeks  At [facility] office      Neela Gonzalez CNM  10/16/2021   show

## 2023-01-30 NOTE — H&P ADULT - HISTORY OF PRESENT ILLNESS
89 year old female with past medical history of hypertension, CHF, and COPD presents to the ED with complaints of productive cough, shortness of breath, and wheezing for 2 weeks. The patient states that she saw her PMD Dr. Bianchi who sent her here for admission. The patient's daughter states that her oxygen can go down to the 70s but that the patient has never required oxygen at home. The patient denies any other symptoms including fever, chest pain, and leg swelling. Allergies: Losartan- angioedema    In ED:   VS 98.2F, 93 HR, 134/94 BP, 21 RR, 95% on 3L NC   K+ 5.6  CXR: b/l interstitial infiltrates (wet read)    89 year old Female, from home, ambulates with RW, with PMHx HTN, COPD, Afib, HFpEF (7/29/22 EF 55-60%, LVH, GIDD, mild pulmonary HTN), Breast CA, and remote h/o LLE DVT presents with cough and shortness of breath for a weeks duration. Per granddaughters at bedside, pt has had dry cough for over a week which became productiev and associated with wheezing over the last few days. Pts O2 saturation has b een fluctuating in the 80s over the past week and this morning was in the high 70s, per granddaughters.  Pt was taken to PCP Dr. Bianchi's office who sent her to the ED for admission. The patient denies any other symptoms including fever, chills ,headache, dizziness, chest pain, n/v/d though has occasional constipation. Also denies urinary complaints or leg swelling. Pt is allergic to Losartan (angioedema).    In ED:   VS 98.2F, 93 HR, 134/94 BP, 21 RR, 95% on 3L NC   K+ 5.6  pro-BNP 3916 (prev 1943)  CXR: b/l interstitial infiltrates (wet read)  s/p 40 lasix and 125mg solumedrol in ED     89 year old Female, from home, ambulates with RW, with PMHx HTN, COPD, Afib, HFpEF (7/29/22 EF 55-60%, LVH, GIDD, mild pulmonary HTN), Breast CA, and remote h/o LLE DVT presents with cough and shortness of breath for a weeks duration. Per granddaughters at bedside, pt has had dry cough for over a week which became productiev and associated with wheezing over the last few days. Pts O2 saturation has b een fluctuating in the 80s over the past week and this morning was in the high 70s, per granddaughters.  Pt was taken to PCP Dr. Bianchi's office who sent her to the ED for admission. The patient denies any other symptoms including fever, chills ,headache, dizziness, chest pain, n/v/d though has occasional constipation. Also denies urinary complaints or leg swelling. Pt is allergic to Losartan (angioedema).    In ED:   VS 98.2F, 93 HR, 134/94 BP, 21 RR, 95% on 3L NC   K+ 5.6  EKG: rate controlled afib (TWI in V1-4, seen prior)  pro-BNP 3916 (prev 1943)  CXR: b/l interstitial infiltrates (wet read)  s/p 40 lasix and 125mg solumedrol in ED

## 2023-01-30 NOTE — ED ADULT NURSE NOTE - OBJECTIVE STATEMENT
Pt is here for SOB and productive cough. Pt is sent from her PMD Dr. Bianchi for admission. Hx COPD, HTN, CHF. Not on home O2. Allergies to Lorsartan. AAOx3. Pt is legally blind.

## 2023-01-31 LAB
ANION GAP SERPL CALC-SCNC: 4 MMOL/L — LOW (ref 5–17)
BUN SERPL-MCNC: 20 MG/DL — HIGH (ref 7–18)
CALCIUM SERPL-MCNC: 8.6 MG/DL — SIGNIFICANT CHANGE UP (ref 8.4–10.5)
CHLORIDE SERPL-SCNC: 92 MMOL/L — LOW (ref 96–108)
CO2 SERPL-SCNC: 40 MMOL/L — HIGH (ref 22–31)
CREAT SERPL-MCNC: 1.21 MG/DL — SIGNIFICANT CHANGE UP (ref 0.5–1.3)
EGFR: 42 ML/MIN/1.73M2 — LOW
GLUCOSE BLDC GLUCOMTR-MCNC: 157 MG/DL — HIGH (ref 70–99)
GLUCOSE BLDC GLUCOMTR-MCNC: 172 MG/DL — HIGH (ref 70–99)
GLUCOSE BLDC GLUCOMTR-MCNC: 250 MG/DL — HIGH (ref 70–99)
GLUCOSE SERPL-MCNC: 235 MG/DL — HIGH (ref 70–99)
HCT VFR BLD CALC: 45 % — SIGNIFICANT CHANGE UP (ref 34.5–45)
HGB BLD-MCNC: 13.2 G/DL — SIGNIFICANT CHANGE UP (ref 11.5–15.5)
MCHC RBC-ENTMCNC: 29.3 GM/DL — LOW (ref 32–36)
MCHC RBC-ENTMCNC: 31.1 PG — SIGNIFICANT CHANGE UP (ref 27–34)
MCV RBC AUTO: 105.9 FL — HIGH (ref 80–100)
NRBC # BLD: 0 /100 WBCS — SIGNIFICANT CHANGE UP (ref 0–0)
PLATELET # BLD AUTO: 240 K/UL — SIGNIFICANT CHANGE UP (ref 150–400)
POTASSIUM SERPL-MCNC: 5 MMOL/L — SIGNIFICANT CHANGE UP (ref 3.5–5.3)
POTASSIUM SERPL-SCNC: 5 MMOL/L — SIGNIFICANT CHANGE UP (ref 3.5–5.3)
RBC # BLD: 4.25 M/UL — SIGNIFICANT CHANGE UP (ref 3.8–5.2)
RBC # FLD: 13.7 % — SIGNIFICANT CHANGE UP (ref 10.3–14.5)
SODIUM SERPL-SCNC: 136 MMOL/L — SIGNIFICANT CHANGE UP (ref 135–145)
WBC # BLD: 6.71 K/UL — SIGNIFICANT CHANGE UP (ref 3.8–10.5)
WBC # FLD AUTO: 6.71 K/UL — SIGNIFICANT CHANGE UP (ref 3.8–10.5)

## 2023-01-31 PROCEDURE — 71250 CT THORAX DX C-: CPT | Mod: 26

## 2023-01-31 RX ORDER — GABAPENTIN 400 MG/1
100 CAPSULE ORAL
Refills: 0 | Status: DISCONTINUED | OUTPATIENT
Start: 2023-01-31 | End: 2023-02-03

## 2023-01-31 RX ORDER — NICOTINE POLACRILEX 2 MG
1 GUM BUCCAL DAILY
Refills: 0 | Status: DISCONTINUED | OUTPATIENT
Start: 2023-01-31 | End: 2023-01-31

## 2023-01-31 RX ADMIN — GABAPENTIN 100 MILLIGRAM(S): 400 CAPSULE ORAL at 17:53

## 2023-01-31 RX ADMIN — Medication 40 MILLIGRAM(S): at 05:59

## 2023-01-31 RX ADMIN — Medication 1 DROP(S): at 22:30

## 2023-01-31 RX ADMIN — CARVEDILOL PHOSPHATE 6.25 MILLIGRAM(S): 80 CAPSULE, EXTENDED RELEASE ORAL at 17:51

## 2023-01-31 RX ADMIN — SIMVASTATIN 20 MILLIGRAM(S): 20 TABLET, FILM COATED ORAL at 22:30

## 2023-01-31 RX ADMIN — MONTELUKAST 10 MILLIGRAM(S): 4 TABLET, CHEWABLE ORAL at 15:03

## 2023-01-31 RX ADMIN — APIXABAN 5 MILLIGRAM(S): 2.5 TABLET, FILM COATED ORAL at 17:50

## 2023-01-31 RX ADMIN — ANASTROZOLE 1 MILLIGRAM(S): 1 TABLET ORAL at 15:04

## 2023-01-31 RX ADMIN — CEFTRIAXONE 100 MILLIGRAM(S): 500 INJECTION, POWDER, FOR SOLUTION INTRAMUSCULAR; INTRAVENOUS at 06:00

## 2023-01-31 RX ADMIN — Medication 3 MILLILITER(S): at 21:17

## 2023-01-31 NOTE — CONSULT NOTE ADULT - SUBJECTIVE AND OBJECTIVE BOX
Patient is a 89y old  Female from home, ambulates with RW, with PMHx HTN, COPD, Afib, HFpEF (22 EF 55-60%, LVH, GIDD, mild pulmonary HTN), Breast CA, and remote h/o LLE DVT, now presents to the ER for evaluation of cough and shortness of breath. She has had dry cough for over a week which became productive and associated with wheezing over the last few days. Pts O2 saturation has been fluctuating in the 80s over the past week and this morning was in the high 70s, per granddaughters.  Pt was taken to PCP Dr. Bianchi's office who sent her to the ER for admission.. On admission, she has no fever, no Leukocytosis, but found to have positive Urine analysis and negative CXR. She has started on Ceftriaxone and The ID consult requested to assist with further evaluation and antibiotic management.        REVIEW OF SYSTEMS: Total of twelve systems have been reviewed with patient and found to be negative unless mentioned in HPI        PAST MEDICAL & SURGICAL HISTORY:  Arthritis  Legally blind  Pre-diabetes  Breast cancer  right, no chemo or radiation  COPD exacerbation  Atrial fibrillation  HF (heart failure)  HFpEF   HTN (hypertension)  Deep vein thrombosis (DVT)  Left Lower Extremity  H/O CHF  No significant past surgical history      SOCIAL HISTORY  Alcohol: Does not drink  Tobacco: Does not smoke  Illicit substance use: None      FAMILY HISTORY: Non contributory to the present illness      ALLERGIES: Chicken (Stomach Upset)  losartan (Angioedema)        Vital Signs Last 24 Hrs  T(C): 36.4 (2023 09:31), Max: 36.8 (2023 16:23)  T(F): 97.5 (2023 09:31), Max: 98.2 (2023 16:23)  HR: 95 (2023 10:45) (72 - 95)  BP: 118/81 (2023 10:45) (111/69 - 137/87)  BP(mean): --  RR: 20 (2023 09:31) (19 - 21)  SpO2: 95% (2023 09:31) (95% - 96%)    Parameters below as of 2023 09:31  Patient On (Oxygen Delivery Method): nasal cannula  O2 Flow (L/min): 2      PHYSICAL EXAM:  GENERAL: Not in distress   CHEST/LUNG:  Not using accessory muscles   HEART: s1 and s2 present  ABDOMEN:  Nontender and  Nondistended  EXTREMITIES: No pedal  edema  CNS: Awake and Alert      LABS:                        13.2   6.71  )-----------( 240      ( 2023 05:55 )             45.0       31    136  |  92<L>  |  20<H>  ----------------------------<  235<H>  5.0   |  40<H>  |  1.21    Ca    8.6      2023 05:55    TPro  7.5  /  Alb  3.2<L>  /  TBili  0.6  /  DBili  x   /  AST  41<H>  /  ALT  29  /  AlkPhos  112      PT/INR - ( 2023 18:00 )   PT: 23.8 sec;   INR: 1.99 ratio      PTT - ( 2023 18:00 )  PTT:34.5 sec      CAPILLARY BLOOD GLUCOSE  POCT Blood Glucose.: 250 mg/dL (2023 01:11)  POCT Blood Glucose.: 176 mg/dL (2023 23:47)  POCT Blood Glucose.: 176 mg/dL (2023 16:31)        Urinalysis Basic - ( 2023 21:30 )  Color: Yellow / Appearance: very cloudy / S.010 / pH: x  Gluc: x / Ketone: Negative  / Bili: Negative / Urobili: Negative   Blood: x / Protein: 30 mg/dL / Nitrite: Negative   Leuk Esterase: Moderate / RBC: 25-50 /HPF / WBC >50 /HPF   Sq Epi: x / Non Sq Epi: Few /HPF / Bacteria: Moderate /HPF        MEDICATIONS  (STANDING):  albuterol/ipratropium for Nebulization 3 milliLiter(s) Nebulizer every 6 hours  anastrozole 1 milliGRAM(s) Oral daily  apixaban 5 milliGRAM(s) Oral two times a day  artificial  tears Solution 1 Drop(s) Both EYES two times a day  carvedilol 6.25 milliGRAM(s) Oral every 12 hours  cefTRIAXone   IVPB 1000 milliGRAM(s) IV Intermittent every 24 hours  furosemide   Injectable 40 milliGRAM(s) IV Push daily  montelukast 10 milliGRAM(s) Oral daily  predniSONE   Tablet 40 milliGRAM(s) Oral daily  simvastatin 20 milliGRAM(s) Oral at bedtime    MEDICATIONS  (PRN):  acetaminophen     Tablet .. 650 milliGRAM(s) Oral every 6 hours PRN Temp greater or equal to 38C (100.4F), Mild Pain (1 - 3)  melatonin 3 milliGRAM(s) Oral at bedtime PRN Insomnia  polyethylene glycol 3350 17 Gram(s) Oral daily PRN Constipation  senna 2 Tablet(s) Oral at bedtime PRN Constipation        RADIOLOGY & ADDITIONAL TESTS:    23: Xray Chest 1 View- PORTABLE-Urgent (23 @ 22:00) There are increased interstitial markings which may represent the   patient's underlying COPD or pulmonary venous congestion. No focal  consolidation or gross effusion.        MICROBIOLOGY DATA:    Flu With COVID-19 By PATRICK (23 @ 18:00)   SARS-CoV-2 Result: NotDetec               Patient is a 89y old  Female from home, ambulates with RW, with PMHx HTN, COPD, Afib, HFpEF (22 EF 55-60%, LVH, GIDD, mild pulmonary HTN), Breast CA, and remote h/o LLE DVT, now presents to the ER for evaluation of cough and shortness of breath. She has had dry cough for over a week which became productive and associated with wheezing over the last few days. Pts O2 saturation has been fluctuating in the 80s over the past week and this morning was in the high 70s, per granddaughters.  Pt was taken to PCP Dr. Bianchi's office who sent her to the ER for admission.. On admission, she has no fever, no Leukocytosis, but found to have positive Urine analysis and negative CXR. She has started on Ceftriaxone and The ID consult requested to assist with further evaluation and antibiotic management.      REVIEW OF SYSTEMS: Unable to obtain due to mental status unless mentioned in HPI      PAST MEDICAL & SURGICAL HISTORY:  Arthritis  Legally blind  Pre-diabetes  Breast cancer  right, no chemo or radiation  COPD exacerbation  Atrial fibrillation  HF (heart failure)  HFpEF   HTN (hypertension)  Deep vein thrombosis (DVT)  Left Lower Extremity  H/O CHF  No significant past surgical history      SOCIAL HISTORY  Alcohol: Does not drink  Tobacco: Does not smoke  Illicit substance use: None      FAMILY HISTORY: Non contributory to the present illness      ALLERGIES: Chicken (Stomach Upset)  losartan (Angioedema)        Vital Signs Last 24 Hrs  T(C): 36.4 (2023 09:31), Max: 36.8 (2023 16:23)  T(F): 97.5 (2023 09:31), Max: 98.2 (2023 16:23)  HR: 95 (2023 10:45) (72 - 95)  BP: 118/81 (2023 10:45) (111/69 - 137/87)  BP(mean): --  RR: 20 (2023 09:31) (19 - 21)  SpO2: 95% (2023 09:31) (95% - 96%)    Parameters below as of 2023 09:31  Patient On (Oxygen Delivery Method): nasal cannula  O2 Flow (L/min): 2      PHYSICAL EXAM:  GENERAL: Not in distress, on oxygen via NC  CHEST/LUNG:  Not using accessory muscles   HEART: s1 and s2 present  ABDOMEN:  Nontender and  Nondistended  EXTREMITIES: No pedal  edema  CNS: Lethargic       LABS:                        13.2   6.71  )-----------( 240      ( 2023 05:55 )             45.0       01-31    136  |  92<L>  |  20<H>  ----------------------------<  235<H>  5.0   |  40<H>  |  1.21    Ca    8.6      2023 05:55    TPro  7.5  /  Alb  3.2<L>  /  TBili  0.6  /  DBili  x   /  AST  41<H>  /  ALT  29  /  AlkPhos  112  30    PT/INR - ( 2023 18:00 )   PT: 23.8 sec;   INR: 1.99 ratio      PTT - ( 2023 18:00 )  PTT:34.5 sec      CAPILLARY BLOOD GLUCOSE  POCT Blood Glucose.: 250 mg/dL (2023 01:11)  POCT Blood Glucose.: 176 mg/dL (2023 23:47)  POCT Blood Glucose.: 176 mg/dL (2023 16:31)        Urinalysis Basic - ( 2023 21:30 )  Color: Yellow / Appearance: very cloudy / S.010 / pH: x  Gluc: x / Ketone: Negative  / Bili: Negative / Urobili: Negative   Blood: x / Protein: 30 mg/dL / Nitrite: Negative   Leuk Esterase: Moderate / RBC: 25-50 /HPF / WBC >50 /HPF   Sq Epi: x / Non Sq Epi: Few /HPF / Bacteria: Moderate /HPF        MEDICATIONS  (STANDING):  albuterol/ipratropium for Nebulization 3 milliLiter(s) Nebulizer every 6 hours  anastrozole 1 milliGRAM(s) Oral daily  apixaban 5 milliGRAM(s) Oral two times a day  artificial  tears Solution 1 Drop(s) Both EYES two times a day  carvedilol 6.25 milliGRAM(s) Oral every 12 hours  cefTRIAXone   IVPB 1000 milliGRAM(s) IV Intermittent every 24 hours  furosemide   Injectable 40 milliGRAM(s) IV Push daily  montelukast 10 milliGRAM(s) Oral daily  predniSONE   Tablet 40 milliGRAM(s) Oral daily  simvastatin 20 milliGRAM(s) Oral at bedtime    MEDICATIONS  (PRN):  acetaminophen     Tablet .. 650 milliGRAM(s) Oral every 6 hours PRN Temp greater or equal to 38C (100.4F), Mild Pain (1 - 3)  melatonin 3 milliGRAM(s) Oral at bedtime PRN Insomnia  polyethylene glycol 3350 17 Gram(s) Oral daily PRN Constipation  senna 2 Tablet(s) Oral at bedtime PRN Constipation        RADIOLOGY & ADDITIONAL TESTS:    23: Xray Chest 1 View- PORTABLE-Urgent (23 @ 22:00) There are increased interstitial markings which may represent the   patient's underlying COPD or pulmonary venous congestion. No focal  consolidation or gross effusion.        MICROBIOLOGY DATA:    Flu With COVID-19 By PATRICK (23 @ 18:00)   SARS-CoV-2 Result: NotDetec

## 2023-01-31 NOTE — CONSULT NOTE ADULT - SUBJECTIVE AND OBJECTIVE BOX
PULMONARY CONSULT NOTE      NUNO LAST  MRN-[id number]    History of Present Illness:  Reason for Admission: [facility]  History of Present Illness:   89 year old Female, from home, ambulates with RW, with PMHx HTN, COPD, Afib, HFpEF (22 EF 55-60%, LVH, GIDD, mild pulmonary HTN), Breast CA, and remote h/o LLE DVT presents with cough and shortness of breath for a weeks duration. Per granddaughters at bedside, pt has had dry cough for over a week which became productiev and associated with wheezing over the last few days. Pts O2 saturation has b een fluctuating in the 80s over the past week and this morning was in the high 70s, per granddaughters.  Pt was taken to PCP Dr. Bianchi's office who sent her to the ED for admission. The patient denies any other symptoms including fever, chills ,headache, dizziness, chest pain, n/v/d though has occasional constipation. Also denies urinary complaints or leg swelling. Pt is allergic to Losartan (angioedema).        HISTORY OF PRESENT ILLNESS: As Above. Lethargic, poorly arousable, laying  in bed in NAD    MEDICATIONS  (STANDING):  albuterol/ipratropium for Nebulization 3 milliLiter(s) Nebulizer every 6 hours  anastrozole 1 milliGRAM(s) Oral daily  apixaban 5 milliGRAM(s) Oral two times a day  artificial  tears Solution 1 Drop(s) Both EYES two times a day  carvedilol 6.25 milliGRAM(s) Oral every 12 hours  cefTRIAXone   IVPB 1000 milliGRAM(s) IV Intermittent every 24 hours  furosemide   Injectable 40 milliGRAM(s) IV Push daily  montelukast 10 milliGRAM(s) Oral daily  predniSONE   Tablet 40 milliGRAM(s) Oral daily  simvastatin 20 milliGRAM(s) Oral at bedtime      MEDICATIONS  (PRN):  acetaminophen     Tablet .. 650 milliGRAM(s) Oral every 6 hours PRN Temp greater or equal to 38C (100.4F), Mild Pain (1 - 3)  melatonin 3 milliGRAM(s) Oral at bedtime PRN Insomnia  polyethylene glycol 3350 17 Gram(s) Oral daily PRN Constipation  senna 2 Tablet(s) Oral at bedtime PRN Constipation      Allergies    losartan (Angioedema)    Intolerances    Chicken (Stomach Upset)      PAST MEDICAL & SURGICAL HISTORY:  Arthritis      Legally blind      Pre-diabetes      Breast cancer  right, no chemo or radiation      COPD exacerbation      Atrial fibrillation      HF (heart failure)  HFpEF       HTN (hypertension)      Deep vein thrombosis (DVT)  Left Lower Extremity      H/O CHF      No significant past surgical history          FAMILY HISTORY:  FH: HTN (hypertension)        SOCIAL HISTORY  Smoking History:     REVIEW OF SYSTEMS:    CONSTITUTIONAL:  No fevers, chills, sweats    HEENT:  Eyes:  No diplopia or blurred vision. ENT:  No earache, sore throat or runny nose.    CARDIOVASCULAR:  No pressure, squeezing, tightness, or heaviness about the chest; no palpitations.    RESPIRATORY:  Per HPI    GASTROINTESTINAL:  No abdominal pain, nausea, vomiting or diarrhea.    GENITOURINARY:  No dysuria, frequency or urgency.    NEUROLOGIC:  No paresthesias, fasciculations, seizures or weakness.    PSYCHIATRIC:  No disorder of thought or mood.    Vital Signs Last 24 Hrs  T(C): 36.4 (2023 09:31), Max: 36.8 (2023 16:23)  T(F): 97.5 (2023 09:31), Max: 98.2 (2023 16:23)  HR: 95 (2023 10:45) (72 - 95)  BP: 118/81 (2023 10:45) (111/69 - 137/87)  BP(mean): --  RR: 20 (2023 09:31) (19 - 21)  SpO2: 95% (2023 09:31) (95% - 96%)    Parameters below as of 2023 09:31  Patient On (Oxygen Delivery Method): nasal cannula  O2 Flow (L/min): 2    I&O's Detail    2023 07:01  -  2023 07:00  --------------------------------------------------------  IN:  Total IN: 0 mL    OUT:    Voided (mL): 400 mL  Total OUT: 400 mL    Total NET: -400 mL      2023 07:01  -  2023 10:54  --------------------------------------------------------  IN:  Total IN: 0 mL    OUT:    Indwelling Catheter - Urethral (mL): 200 mL  Total OUT: 200 mL    Total NET: -200 mL          PHYSICAL EXAMINATION:    GENERAL: The patient is a well-developed, well-nourished _____in no apparent distress.     HEENT: Head is normocephalic and atraumatic. Extraocular muscles are intact. Mucous membranes are moist.     NECK: Supple.     LUNGS: Clear to auscultation without wheezing, rales, or rhonchi. Respirations unlabored    HEART: Regular rate and rhythm without murmur.    ABDOMEN: Soft, nontender, and nondistended.  No hepatosplenomegaly is noted.    EXTREMITIES: Without any cyanosis, clubbing, rash, lesions + edema.    NEUROLOGIC: Grossly intact.      LABS:                        13.2   6.71  )-----------( 240      ( 2023 05:55 )             45.0         136  |  92<L>  |  20<H>  ----------------------------<  235<H>  5.0   |  40<H>  |  1.21    Ca    8.6      2023 05:55    TPro  7.5  /  Alb  3.2<L>  /  TBili  0.6  /  DBili  x   /  AST  41<H>  /  ALT  29  /  AlkPhos  112      PT/INR - ( 2023 18:00 )   PT: 23.8 sec;   INR: 1.99 ratio         PTT - ( 2023 18:00 )  PTT:34.5 sec  Urinalysis Basic - ( 2023 21:30 )    Color: Yellow / Appearance: very cloudy / S.010 / pH: x  Gluc: x / Ketone: Negative  / Bili: Negative / Urobili: Negative   Blood: x / Protein: 30 mg/dL / Nitrite: Negative   Leuk Esterase: Moderate / RBC: 25-50 /HPF / WBC >50 /HPF   Sq Epi: x / Non Sq Epi: Few /HPF / Bacteria: Moderate /HPF              Serum Pro-Brain Natriuretic Peptide: 3916 pg/mL (23 @ 20:20)    Lactate, Blood: 0.9 mmol/L (23 @ 18:00)        MICROBIOLOGY:    RADIOLOGY & ADDITIONAL STUDIES:    CXR:  < from: Xray Chest 1 View- PORTABLE-Urgent (23 @ 22:00) >  Impression:    There are increased interstitial markings which may represent the   patient's underlying COPD or pulmonary venous congestion. No focal   consolidation or gross effusion.    < end of copied text >    Ct scan chest;    ekg;    echo:

## 2023-01-31 NOTE — PHYSICAL THERAPY INITIAL EVALUATION ADULT - PATIENT/FAMILY AGREES WITH PLAN
A1C goal: <7%  Fasting/premeal blood glucose goal:   2 hour post-meal blood glucose goal: less than 180       Increase activity - try to walk at least 1/2 hour or more a day.   See Diabetes Educator/Registered Dietician for Medical Nutrition Therapy.   Increase metformin to 750 mg twice daily with meals. Call when you run out so that we can increase it to 1000 mg twice daily with 500 mg tablets.   Try to take Lantus consistently every morning - set an alarm.   Increase lantus to 42 units every morning.   Continue to take Humalog 22 units before meals.   Start Trulicity 0.75 mg once weekly for 4 weeks. Then increase to Trulicity 1.5 mg once weekly.     Test blood sugar before meals and bedtime.    Prescription sent for Freestyle Jose sensor and reader. If you're able to pick it up, please see the diabetes educator for training.     Return to clinic in 6 weeks with written blood sugar log.     This medication works by lowering glucoses whenever you eat carbohydrates. Therefore, it has a very low chance of lowering your glucose too much (i.e. Low risk of hypoglycemia). It also reduces appetite and decreases the rate of digestion, which may lead to some weight loss. The side effects include nausea and upset stomach. Risks include pancreatitis. Go to ER if having severe abdominal pain, nausea or vomiting.    In rodent studies, there was an increased risk for thyroid c-cell tumors; Risk in humans for medullary thyroid cancer. Do not take if you have personal or family history of thyroid medullary cancer or MEN2.          grand involve in care

## 2023-01-31 NOTE — CONSULT NOTE ADULT - ASSESSMENT
89 year old Female, from home, ambulates with RW, with PMHx HTN, COPD, Afib, HFpEF (7/29/22 EF 55-60%, LVH, GIDD, mild pulmonary HTN), Breast CA, and remote h/o LLE DVT presents with cough and shortness of breath for a weeks duration,acute diastolic HF.  1.Acute diastolic HF-IV lasix.  2.Afib-eliquis,coreg.  3.COPD-Pulm eval.  4.HTN-coreg.  5.PPI.

## 2023-01-31 NOTE — PROGRESS NOTE ADULT - ASSESSMENT
Patient is 89 year old Female, from home, ambulates with RW, with PMHx HTN, COPD, Afib, HFpEF (7/29/22 EF 55-60%, LVH, GIDD, mild pulmonary HTN), Breast CA, and remote h/o LLE DVT presents with cough and shortness of breath for a weeks duration. Pt was taken to PCP Dr. Bianchi's office who sent her to the ED for admission. Patient admitted   K+ 5.6  EKG: rate controlled afib (TWI in V1-4, seen prior)  pro-BNP 3916 (prev 1943)  CXR: b/l interstitial infiltrates (wet read)  s/p 40 lasix and 125mg solumedrol in ED      Patient is 89 year old Female, from home, ambulates with RW, with PMHx HTN, COPD, Afib, HFpEF (7/29/22 EF 55-60%, LVH, GIDD, mild pulmonary HTN), Breast CA, and remote h/o LLE DVT presents with cough and shortness of breath for a weeks duration. Pt was taken to PCP Dr. Bianchi's office who sent her to the ED for admission. Patient admitted to medicine for hypoxia 2/2 COPD exacerbation. CXR: b/l interstitial infiltrates, patient treated with Lasix and IV steroids. EKG: rate controlled afib, cardiology consulted for recommendations.  Patient found to have elevated K+ 5.6 and was given insulin 5U and D50.  Pulmonary consulted started on bronchodilators steroids and Spiriva, recommended pft as outpatient.

## 2023-01-31 NOTE — PROGRESS NOTE ADULT - SUBJECTIVE AND OBJECTIVE BOX
NP Note discussed with Primary Attending.      Patient is a 89y old  Female who presents with a chief complaint of AHRF (2023 13:07)      INTERVAL HPI/OVERNIGHT EVENTS: no new complaints    MEDICATIONS  (STANDING):  albuterol/ipratropium for Nebulization 3 milliLiter(s) Nebulizer every 6 hours  anastrozole 1 milliGRAM(s) Oral daily  apixaban 5 milliGRAM(s) Oral two times a day  artificial  tears Solution 1 Drop(s) Both EYES two times a day  carvedilol 6.25 milliGRAM(s) Oral every 12 hours  cefTRIAXone   IVPB 1000 milliGRAM(s) IV Intermittent every 24 hours  furosemide   Injectable 40 milliGRAM(s) IV Push daily  montelukast 10 milliGRAM(s) Oral daily  predniSONE   Tablet 40 milliGRAM(s) Oral daily  simvastatin 20 milliGRAM(s) Oral at bedtime    MEDICATIONS  (PRN):  acetaminophen     Tablet .. 650 milliGRAM(s) Oral every 6 hours PRN Temp greater or equal to 38C (100.4F), Mild Pain (1 - 3)  melatonin 3 milliGRAM(s) Oral at bedtime PRN Insomnia  polyethylene glycol 3350 17 Gram(s) Oral daily PRN Constipation  senna 2 Tablet(s) Oral at bedtime PRN Constipation      __________________________________________________  REVIEW OF SYSTEMS:    CONSTITUTIONAL: No fever,   EYES: no acute visual disturbances  NECK: No pain or stiffness  RESPIRATORY: No cough; + shortness of breath  CARDIOVASCULAR: No chest pain, no palpitations  GASTROINTESTINAL: No pain. No nausea or vomiting; No diarrhea   NEUROLOGICAL: No headache or numbness, no tremors  MUSCULOSKELETAL: No joint pain, no muscle pain  GENITOURINARY: no dysuria, no frequency, no hesitancy  PSYCHIATRY: no depression , no anxiety  ALL OTHER  ROS negative        Vital Signs Last 24 Hrs  T(C): 36.7 (2023 13:23), Max: 36.8 (2023 16:23)  T(F): 98 (2023 13:23), Max: 98.2 (2023 16:23)  HR: 68 (2023 13:23) (68 - 95)  BP: 104/65 (2023 13:23) (104/65 - 137/87)  BP(mean): --  RR: 18 (2023 13:23) (18 - 21)  SpO2: 96% (2023 13:23) (95% - 96%)    Parameters below as of 2023 13:23  Patient On (Oxygen Delivery Method): nasal cannula  O2 Flow (L/min): 2      ________________________________________________  PHYSICAL EXAM:  GENERAL: NAD  HEENT: Normocephalic;  conjunctivae and sclerae clear; moist mucous membranes;   NECK : supple  CHEST/LUNG: Crackles to auscultation bilaterally.  HEART: S1 S2  regular; no murmurs, gallops or rubs  ABDOMEN: Soft, Nontender, Nondistended; Bowel sounds present  EXTREMITIES: no cyanosis; no edema; no calf tenderness  SKIN: warm and dry; no rash  NERVOUS SYSTEM:  Awake and alert; Oriented  to place, person and time ; no new deficits    _________________________________________________  LABS:                        13.2   6.71  )-----------( 240      ( 2023 05:55 )             45.0         136  |  92<L>  |  20<H>  ----------------------------<  235<H>  5.0   |  40<H>  |  1.21    Ca    8.6      2023 05:55    TPro  7.5  /  Alb  3.2<L>  /  TBili  0.6  /  DBili  x   /  AST  41<H>  /  ALT  29  /  AlkPhos  112      PT/INR - ( 2023 18:00 )   PT: 23.8 sec;   INR: 1.99 ratio         PTT - ( 2023 18:00 )  PTT:34.5 sec  Urinalysis Basic - ( 2023 21:30 )    Color: Yellow / Appearance: very cloudy / S.010 / pH: x  Gluc: x / Ketone: Negative  / Bili: Negative / Urobili: Negative   Blood: x / Protein: 30 mg/dL / Nitrite: Negative   Leuk Esterase: Moderate / RBC: 25-50 /HPF / WBC >50 /HPF   Sq Epi: x / Non Sq Epi: Few /HPF / Bacteria: Moderate /HPF      CAPILLARY BLOOD GLUCOSE      POCT Blood Glucose.: 172 mg/dL (2023 14:43)  POCT Blood Glucose.: 250 mg/dL (2023 01:11)  POCT Blood Glucose.: 176 mg/dL (2023 23:47)  POCT Blood Glucose.: 176 mg/dL (2023 16:31)        RADIOLOGY & ADDITIONAL TESTS:    ACC: [id number] EXAM:  XR CHEST PORTABLE URGENT 1V   ORDERED BY: PAOLA VU     PROCEDURE DATE:  2023          INTERPRETATION:  INDICATION: COPD exacerbation    Portable chest 7:20 PM    COMPARISON: 2023    FINDINGS:  Heart/Vascular: The mediastinum, hilum and aorta are within normal limits   for projection. The heart appears enlarged despite projection.  Pulmonary: Midline trachea. There are increased interstitial markings   which may represent the patient's underlying COPD or pulmonary venous   congestion. No focal consolidation or gross effusion.    Bones: There is no fracture.  Lines and catheter: None    Impression:    There are increased interstitial markings which may represent the   patient's underlying COPD or pulmonary venous congestion. No focal   consolidation or gross effusion.    --- End of Report ---             ASYA HYDE DO; Attending Radiologist  This document has been electronically signed. 2023  9:04AM      Imaging  Reviewed:  YES/NO    Consultant(s) Notes Reviewed:   YES/ No      Plan of care was discussed with patient and /or primary care giver; all questions and concerns were addressed

## 2023-01-31 NOTE — PROGRESS NOTE ADULT - PROBLEM SELECTOR PLAN 2
pt also with inc sputum and wheezing  s/p 125mg solumedrol in ED with improvement  started on standing nebulizers  c/w 40mg PO prednisone for 5 days  Pulm Huy consulted.

## 2023-01-31 NOTE — PROGRESS NOTE ADULT - PROBLEM SELECTOR PLAN 3
CXR with interstitial infiltrates, pt with significant edema on exam  Echo 7/22: EF wnl, GIDD  s/p 40 IV lasix in ED with improvement   c/w 40mg IV lasix qd (equivalent to home PO dose)   long placed in ED  strict Is and Os   Cardio Nabatian consulted.

## 2023-01-31 NOTE — CONSULT NOTE ADULT - SUBJECTIVE AND OBJECTIVE BOX
CHIEF COMPLAINT:Patient is a 89y old  Female who presents with a chief complaint of AHRF (31 Jan 2023 10:54)      HPI:  89 year old Female, from home, ambulates with RW, with PMHx HTN, COPD, Afib, HFpEF (7/29/22 EF 55-60%, LVH, GIDD, mild pulmonary HTN), Breast CA, and remote h/o LLE DVT presents with cough and shortness of breath for a weeks duration. Per granddaughters at bedside, pt has had dry cough for over a week which became productiev and associated with wheezing over the last few days. Pts O2 saturation has b een fluctuating in the 80s over the past week and this morning was in the high 70s, per granddaughters.  Pt was taken to PCP Dr. Bianchi's office who sent her to the ED for admission. The patient denies any other symptoms including fever, chills ,headache, dizziness, chest pain, n/v/d though has occasional constipation. Also denies urinary complaints or leg swelling. Pt is allergic to Losartan (angioedema).    In ED:   VS 98.2F, 93 HR, 134/94 BP, 21 RR, 95% on 3L NC   K+ 5.6  EKG: rate controlled afib (TWI in V1-4, seen prior)  pro-BNP 3916 (prev 1943)  CXR: b/l interstitial infiltrates (wet read)  s/p 40 lasix and 125mg solumedrol in ED     (30 Jan 2023 21:22)      PAST MEDICAL & SURGICAL HISTORY:  Arthritis      Legally blind      Pre-diabetes      Breast cancer  right, no chemo or radiation      COPD exacerbation      Atrial fibrillation      HF (heart failure)  HFpEF 7/29      HTN (hypertension)      Deep vein thrombosis (DVT)  Left Lower Extremity      H/O CHF      No significant past surgical history          MEDICATIONS  (STANDING):  albuterol/ipratropium for Nebulization 3 milliLiter(s) Nebulizer every 6 hours  anastrozole 1 milliGRAM(s) Oral daily  apixaban 5 milliGRAM(s) Oral two times a day  artificial  tears Solution 1 Drop(s) Both EYES two times a day  carvedilol 6.25 milliGRAM(s) Oral every 12 hours  cefTRIAXone   IVPB 1000 milliGRAM(s) IV Intermittent every 24 hours  furosemide   Injectable 40 milliGRAM(s) IV Push daily  montelukast 10 milliGRAM(s) Oral daily  predniSONE   Tablet 40 milliGRAM(s) Oral daily  simvastatin 20 milliGRAM(s) Oral at bedtime    MEDICATIONS  (PRN):  acetaminophen     Tablet .. 650 milliGRAM(s) Oral every 6 hours PRN Temp greater or equal to 38C (100.4F), Mild Pain (1 - 3)  melatonin 3 milliGRAM(s) Oral at bedtime PRN Insomnia  polyethylene glycol 3350 17 Gram(s) Oral daily PRN Constipation  senna 2 Tablet(s) Oral at bedtime PRN Constipation      FAMILY HISTORY:  FH: HTN (hypertension)        SOCIAL HISTORY:    [ ] Non-smoker  [ ] Smoker  [ ] Alcohol    Allergies    losartan (Angioedema)    Intolerances    Chicken (Stomach Upset)  	    REVIEW OF SYSTEMS:  CONSTITUTIONAL: No fever, weight loss, or fatigue  EYES: No eye pain, visual disturbances, or discharge  ENT:  No difficulty hearing, tinnitus, vertigo; No sinus or throat pain  NECK: No pain or stiffness  RESPIRATORY: No cough, wheezing, chills or hemoptysis; No Shortness of Breath  CARDIOVASCULAR: No chest pain, palpitations, passing out, dizziness, or leg swelling  GASTROINTESTINAL: No abdominal or epigastric pain. No nausea, vomiting, or hematemesis; No diarrhea or constipation. No melena or hematochezia.  GENITOURINARY: No dysuria, frequency, hematuria, or incontinence  NEUROLOGICAL: No headaches, memory loss, loss of strength, numbness, or tremors  SKIN: No itching, burning, rashes, or lesions   LYMPH Nodes: No enlarged glands  ENDOCRINE: No heat or cold intolerance; No hair loss  MUSCULOSKELETAL: No joint pain or swelling; No muscle, back, or extremity pain  PSYCHIATRIC: No depression, anxiety, mood swings, or difficulty sleeping  HEME/LYMPH: No easy bruising, or bleeding gums  ALLERGY AND IMMUNOLOGIC: No hives or eczema	    [ ] All others negative	  [ ] Unable to obtain    PHYSICAL EXAM:  T(C): 36.4 (01-31-23 @ 09:31), Max: 36.8 (01-30-23 @ 16:23)  HR: 95 (01-31-23 @ 10:45) (72 - 95)  BP: 118/81 (01-31-23 @ 10:45) (111/69 - 137/87)  RR: 20 (01-31-23 @ 09:31) (19 - 21)  SpO2: 95% (01-31-23 @ 09:31) (95% - 96%)  Wt(kg): --  I&O's Summary    30 Jan 2023 07:01  -  31 Jan 2023 07:00  --------------------------------------------------------  IN: 0 mL / OUT: 400 mL / NET: -400 mL    31 Jan 2023 07:01  -  31 Jan 2023 12:51  --------------------------------------------------------  IN: 0 mL / OUT: 200 mL / NET: -200 mL        Appearance: Normal	  HEENT:   Normal oral mucosa, PERRL, EOMI	  Lymphatic: No lymphadenopathy  Cardiovascular: Normal S1 S2, No JVD, No murmurs, No edema  Respiratory: Lungs clear to auscultation	  Psychiatry: A & O x 3, Mood & affect appropriate  Gastrointestinal:  Soft, Non-tender, + BS	  Skin: No rashes, No ecchymoses, No cyanosis	  Neurologic: Non-focal  Extremities: Normal range of motion, No clubbing, cyanosis or edema  Vascular: Peripheral pulses palpable 2+ bilaterally    TELEMETRY: 	    ECG:  	  RADIOLOGY:  OTHER: 	  	  LABS:	 	    CARDIAC MARKERS:      Troponin I, High Sensitivity Result: 110.9 ng/L (01-30-23 @ 20:20)                          13.2   6.71  )-----------( 240      ( 31 Jan 2023 05:55 )             45.0     01-31    136  |  92<L>  |  20<H>  ----------------------------<  235<H>  5.0   |  40<H>  |  1.21    Ca    8.6      31 Jan 2023 05:55    TPro  7.5  /  Alb  3.2<L>  /  TBili  0.6  /  DBili  x   /  AST  41<H>  /  ALT  29  /  AlkPhos  112  01-30    proBNP: Serum Pro-Brain Natriuretic Peptide: 3916 pg/mL (01-30 @ 20:20)    Lipid Profile:   HgA1c:   TSH:     PREVIOUS DIAGNOSTIC TESTING:    [ ] Echocardiogram:  [ ]  Catheterization:  [ ] Stress Test:         CHIEF COMPLAINT:Patient is a 89y old  Female who presents with a chief complaint of AHRF (31 Jan 2023 10:54)      HPI:  89 year old Female, from home, ambulates with RW, with PMHx HTN, COPD, Afib, HFpEF (7/29/22 EF 55-60%, LVH, GIDD, mild pulmonary HTN), Breast CA, and remote h/o LLE DVT presents with cough and shortness of breath for a weeks duration. Per granddaughters at bedside, pt has had dry cough for over a week which became productiev and associated with wheezing over the last few days. Pts O2 saturation has b een fluctuating in the 80s over the past week and this morning was in the high 70s, per granddaughters.  Pt was taken to PCP Dr. Bianchi's office who sent her to the ED for admission. The patient denies any other symptoms including fever, chills ,headache, dizziness, chest pain, n/v/d though has occasional constipation. Also denies urinary complaints or leg swelling. Pt is allergic to Losartan (angioedema).    In ED:   VS 98.2F, 93 HR, 134/94 BP, 21 RR, 95% on 3L NC   K+ 5.6  EKG: rate controlled afib (TWI in V1-4, seen prior)  pro-BNP 3916 (prev 1943)  CXR: b/l interstitial infiltrates (wet read)  s/p 40 lasix and 125mg solumedrol in ED     (30 Jan 2023 21:22)      PAST MEDICAL & SURGICAL HISTORY:  Arthritis      Legally blind      Pre-diabetes      Breast cancer  right, no chemo or radiation      COPD exacerbation      Atrial fibrillation      HF (heart failure)  HFpEF 7/29      HTN (hypertension)      Deep vein thrombosis (DVT)  Left Lower Extremity      H/O CHF      No significant past surgical history          MEDICATIONS  (STANDING):  albuterol/ipratropium for Nebulization 3 milliLiter(s) Nebulizer every 6 hours  anastrozole 1 milliGRAM(s) Oral daily  apixaban 5 milliGRAM(s) Oral two times a day  artificial  tears Solution 1 Drop(s) Both EYES two times a day  carvedilol 6.25 milliGRAM(s) Oral every 12 hours  cefTRIAXone   IVPB 1000 milliGRAM(s) IV Intermittent every 24 hours  furosemide   Injectable 40 milliGRAM(s) IV Push daily  montelukast 10 milliGRAM(s) Oral daily  predniSONE   Tablet 40 milliGRAM(s) Oral daily  simvastatin 20 milliGRAM(s) Oral at bedtime    MEDICATIONS  (PRN):  acetaminophen     Tablet .. 650 milliGRAM(s) Oral every 6 hours PRN Temp greater or equal to 38C (100.4F), Mild Pain (1 - 3)  melatonin 3 milliGRAM(s) Oral at bedtime PRN Insomnia  polyethylene glycol 3350 17 Gram(s) Oral daily PRN Constipation  senna 2 Tablet(s) Oral at bedtime PRN Constipation      FAMILY HISTORY:  FH: HTN (hypertension)        SOCIAL HISTORY:    [x ] Non-smoker    [ x] Alcohol-denies    Allergies    losartan (Angioedema)    Intolerances    Chicken (Stomach Upset)  	    REVIEW OF SYSTEMS:  CONSTITUTIONAL: No fever, weight loss, or fatigue  EYES: No eye pain, visual disturbances, or discharge  ENT:  No difficulty hearing, tinnitus, vertigo; No sinus or throat pain  NECK: No pain or stiffness  RESPIRATORY: No cough, wheezing, chills or hemoptysis; + Shortness of Breath  CARDIOVASCULAR: No chest pain, palpitations, passing out, dizziness, + leg swelling  GASTROINTESTINAL: No abdominal or epigastric pain. No nausea, vomiting, or hematemesis; No diarrhea or constipation. No melena or hematochezia.  GENITOURINARY: No dysuria, frequency, hematuria, or incontinence  NEUROLOGICAL: No headaches, memory loss, loss of strength, numbness, or tremors  SKIN: No itching, burning, rashes, or lesions   LYMPH Nodes: No enlarged glands  ENDOCRINE: No heat or cold intolerance; No hair loss  MUSCULOSKELETAL: No joint pain or swelling; No muscle, back, or extremity pain  PSYCHIATRIC: No depression, anxiety, mood swings, or difficulty sleeping  HEME/LYMPH: No easy bruising, or bleeding gums  ALLERGY AND IMMUNOLOGIC: No hives or eczema	        PHYSICAL EXAM:  T(C): 36.4 (01-31-23 @ 09:31), Max: 36.8 (01-30-23 @ 16:23)  HR: 95 (01-31-23 @ 10:45) (72 - 95)  BP: 118/81 (01-31-23 @ 10:45) (111/69 - 137/87)  RR: 20 (01-31-23 @ 09:31) (19 - 21)  SpO2: 95% (01-31-23 @ 09:31) (95% - 96%)  Wt(kg): --  I&O's Summary    30 Jan 2023 07:01  -  31 Jan 2023 07:00  --------------------------------------------------------  IN: 0 mL / OUT: 400 mL / NET: -400 mL    31 Jan 2023 07:01  -  31 Jan 2023 12:51  --------------------------------------------------------  IN: 0 mL / OUT: 200 mL / NET: -200 mL        Appearance: Normal	  HEENT:   Normal oral mucosa, PERRL, EOMI	  Lymphatic: No lymphadenopathy  Cardiovascular: Normal S1 S2, No JVD, No murmurs, No edema  Respiratory: Dec BS at bases	  Psychiatry: A & O x 3, Mood & affect appropriate  Gastrointestinal:  Soft, Non-tender, + BS	  Skin: No rashes, No ecchymoses, No cyanosis	  Neurologic: Non-focal  Extremities: Normal range of motion, No clubbing, cyanosis or edema  Vascular: Peripheral pulses palpable 2+ bilaterally        ECG:  afib,q v1-v3	  	  	  LABS:	 	    Troponin I, High Sensitivity Result: 110.9 ng/L (01-30-23 @ 20:20)                          13.2   6.71  )-----------( 240      ( 31 Jan 2023 05:55 )             45.0     01-31    136  |  92<L>  |  20<H>  ----------------------------<  235<H>  5.0   |  40<H>  |  1.21    Ca    8.6      31 Jan 2023 05:55    TPro  7.5  /  Alb  3.2<L>  /  TBili  0.6  /  DBili  x   /  AST  41<H>  /  ALT  29  /  AlkPhos  112  01-30    proBNP: Serum Pro-Brain Natriuretic Peptide: 3916 pg/mL (01-30 @ 20:20)          < from: Transthoracic Echocardiogram (07.29.22 @ 08:30) >  OBSERVATIONS:  Mitral Valve: Normal mitral valve.  Aortic Root: Aortic Root: 4.0 cm.    Aortic Valve: Calcified trileaflet aortic valve with normal  opening.  Left Atrium: Normal left atrium.  LA volume index = 32  cc/m2.  Left Ventricle: Normal Left Ventricular Systolic Function,  (EF = 55 to 60%) Mild concentric left ventricular  hypertrophy. Grade I diastolic dysfunction (Impaired  relaxation, mild).  Right Heart: Normal right atrium. Normal right ventricular  size and function. Normal tricuspid valve. There is trace  pulmonic regurgitation.  Pericardium/PleuraNormal pericardium with no pericardial  effusion.  Hemodynamic: RV systolic pressure is mildly increased at  39 mm Hg.    < end of copied text >  < from: Xray Chest 1 View- PORTABLE-Urgent (01.30.23 @ 22:00) >  ACC: [id number] EXAM:  XR CHEST PORTABLE URGENT 1V   ORDERED BY: PAOLA VU     PROCEDURE DATE:  01/30/2023          INTERPRETATION:  INDICATION: COPD exacerbation    Portable chest 7:20 PM    COMPARISON: 1/11/2023    FINDINGS:  Heart/Vascular: The mediastinum, hilum and aorta are within normal limits   for projection. The heart appears enlarged despite projection.  Pulmonary: Midline trachea. There are increased interstitial markings   which may represent the patient's underlying COPD or pulmonary venous   congestion. No focal consolidation or gross effusion.    Bones: There is no fracture.  Lines and catheter: None    Impression:    There are increased interstitial markings which may represent the   patient's underlying COPD or pulmonary venous congestion. No focal   consolidation or gross effusion.    --- End of Report ---             ASYA HYDE DO; Attending Radiologist  This document has been electronically signed. Jan 31 2023  9:04AM    < end of copied text >

## 2023-01-31 NOTE — CONSULT NOTE ADULT - ASSESSMENT
Patient is a 89y old  Female from home, ambulates with RW, with PMHx HTN, COPD, Afib, HFpEF (7/29/22 EF 55-60%, LVH, GIDD, mild pulmonary HTN), Breast CA, and remote h/o LLE DVT, now presents to the ER for evaluation of cough and shortness of breath. She has had dry cough for over a week which became productive and associated with wheezing over the last few days. Pts O2 saturation has been fluctuating in the 80s over the past week and this morning was in the high 70s, per granddaughters.  Pt was taken to PCP Dr. Bianchi's office who sent her to the ER for admission.. On admission, she has no fever, no Leukocytosis, but found to have positive Urine analysis and negative CXR. She has started on Ceftriaxone and The ID consult requested to assist with further evaluation and antibiotic management.    # UTI  # HYpoxia- on supplemental oxygenation    would recommend:    1. Please obtain Procalcitonin level  2. Follow up Urine and blood cultures  3. CT chest without contrast to further assessment of pneumonia in the setting of hypoxia  4. Continue Ceftriaxone until work up is done  5. Supplemental oxygenation and bronchodilator as needed    will follow the patient with you and make further recommendation based on the clinical course and Lab results  Thank you for the opportunity to participate in Ms. LAST's care      Attending Attestation:    Spent more than 65 minutes on total encounter, more than 50 % of the visit was spent counseling and/or coordinating care by the Attending physician.  '       Patient is a 89y old  Female from home, ambulates with RW, with PMHx HTN, COPD, Afib, HFpEF (7/29/22 EF 55-60%, LVH, GIDD, mild pulmonary HTN), Breast CA, and remote h/o LLE DVT, now presents to the ER for evaluation of cough and shortness of breath. She has had dry cough for over a week which became productive and associated with wheezing over the last few days. Pts O2 saturation has been fluctuating in the 80s over the past week and this morning was in the high 70s, per granddaughters.  Pt was taken to PCP Dr. Bianchi's office who sent her to the ER for admission.. On admission, she has no fever, no Leukocytosis, but found to have positive Urine analysis and negative CXR. She has started on Ceftriaxone and The ID consult requested to assist with further evaluation and antibiotic management.      # Metabolic encephalopathy   # UTI  # Hypoxia- on supplemental oxygenation    would recommend:    1. Please obtain Procalcitonin level  2. Follow up Urine and blood cultures  3. CT chest without contrast to further assessment of pneumonia in the setting of hypoxia  4. Continue Ceftriaxone until work up is done  5. Supplemental oxygenation and bronchodilator as needed    d/w Family at the bed side and Covering NP, Nawra    will follow the patient with you and make further recommendation based on the clinical course and Lab results  Thank you for the opportunity to participate in Ms. LAST's care      Attending Attestation:    Spent more than 65 minutes on total encounter, more than 50 % of the visit was spent counseling and/or coordinating care by the Attending physician.  '       Patient is a 89y old  Female from home, ambulates with RW, with PMHx HTN, COPD, Afib, HFpEF (7/29/22 EF 55-60%, LVH, GIDD, mild pulmonary HTN), Breast CA, and remote h/o LLE DVT, now presents to the ER for evaluation of cough and shortness of breath. She has had dry cough for over a week which became productive and associated with wheezing over the last few days. Pts O2 saturation has been fluctuating in the 80s over the past week and this morning was in the high 70s, per granddaughters.  Pt was taken to PCP Dr. Bianchi's office who sent her to the ER for admission.. On admission, she has no fever, no Leukocytosis, but found to have positive Urine analysis and negative CXR. She has started on Ceftriaxone and The ID consult requested to assist with further evaluation and antibiotic management.      # Metabolic encephalopathy   # UTI  # Hypoxia- on supplemental oxygenation    would recommend:    1. Please obtain Procalcitonin level  2. Follow up Urine and blood cultures  3. CT chest without contrast to further assessment of pneumonia in the setting of hypoxia  4. Continue Ceftriaxone until work up is done  5. Supplemental oxygenation and bronchodilator as needed    d/w Family at the bed side and Covering NP, Nawra    will follow the patient with you and make further recommendation based on the clinical course and Lab results  Thank you for the opportunity to participate in Ms. LAST's care      Attending Attestation:    Spent more than 65 minutes on total encounter, more than 50 % of the visit was spent counseling and/or coordinating care by the Attending physician.  '  Complexity:    # Metabolic encephalopathy   # UTI  # Hypoxia- on supplemental oxygenation

## 2023-01-31 NOTE — PROGRESS NOTE ADULT - PROBLEM SELECTOR PLAN 1
pt p/w cough, SOB, wheezing and hypoxia   CXR with interstitial infiltrates  pt w/ b/l edema   improved s/p nebulizer and IV Lasix and steroids.  supplement O2  treat for CHF and copd as below.  likely secondary to CHF vs Copd  monitor oxygen sat  monitor resp status.  Pulm dr. Son  ID dr. Denton

## 2023-01-31 NOTE — PATIENT PROFILE ADULT - FALL HARM RISK - RISK INTERVENTIONS

## 2023-02-01 LAB
ALBUMIN SERPL ELPH-MCNC: 3.2 G/DL — LOW (ref 3.5–5)
ALP SERPL-CCNC: 102 U/L — SIGNIFICANT CHANGE UP (ref 40–120)
ALT FLD-CCNC: 25 U/L DA — SIGNIFICANT CHANGE UP (ref 10–60)
ANION GAP SERPL CALC-SCNC: -2 MMOL/L — LOW (ref 5–17)
AST SERPL-CCNC: 23 U/L — SIGNIFICANT CHANGE UP (ref 10–40)
BILIRUB SERPL-MCNC: 0.5 MG/DL — SIGNIFICANT CHANGE UP (ref 0.2–1.2)
BUN SERPL-MCNC: 20 MG/DL — HIGH (ref 7–18)
CALCIUM SERPL-MCNC: 8.9 MG/DL — SIGNIFICANT CHANGE UP (ref 8.4–10.5)
CHLORIDE SERPL-SCNC: 94 MMOL/L — LOW (ref 96–108)
CO2 SERPL-SCNC: 44 MMOL/L — HIGH (ref 22–31)
CREAT SERPL-MCNC: 1.05 MG/DL — SIGNIFICANT CHANGE UP (ref 0.5–1.3)
EGFR: 50 ML/MIN/1.73M2 — LOW
GLUCOSE BLDC GLUCOMTR-MCNC: 146 MG/DL — HIGH (ref 70–99)
GLUCOSE BLDC GLUCOMTR-MCNC: 166 MG/DL — HIGH (ref 70–99)
GLUCOSE BLDC GLUCOMTR-MCNC: 218 MG/DL — HIGH (ref 70–99)
GLUCOSE BLDC GLUCOMTR-MCNC: 262 MG/DL — HIGH (ref 70–99)
GLUCOSE SERPL-MCNC: 145 MG/DL — HIGH (ref 70–99)
HCT VFR BLD CALC: 43.3 % — SIGNIFICANT CHANGE UP (ref 34.5–45)
HGB BLD-MCNC: 12.8 G/DL — SIGNIFICANT CHANGE UP (ref 11.5–15.5)
MAGNESIUM SERPL-MCNC: 2.4 MG/DL — SIGNIFICANT CHANGE UP (ref 1.6–2.6)
MCHC RBC-ENTMCNC: 29.6 GM/DL — LOW (ref 32–36)
MCHC RBC-ENTMCNC: 31.4 PG — SIGNIFICANT CHANGE UP (ref 27–34)
MCV RBC AUTO: 106.1 FL — HIGH (ref 80–100)
NRBC # BLD: 0 /100 WBCS — SIGNIFICANT CHANGE UP (ref 0–0)
PHOSPHATE SERPL-MCNC: 3.2 MG/DL — SIGNIFICANT CHANGE UP (ref 2.5–4.5)
PLATELET # BLD AUTO: 241 K/UL — SIGNIFICANT CHANGE UP (ref 150–400)
POTASSIUM SERPL-MCNC: 5.2 MMOL/L — SIGNIFICANT CHANGE UP (ref 3.5–5.3)
POTASSIUM SERPL-SCNC: 5.2 MMOL/L — SIGNIFICANT CHANGE UP (ref 3.5–5.3)
PROCALCITONIN SERPL-MCNC: 0.03 NG/ML — SIGNIFICANT CHANGE UP (ref 0.02–0.1)
PROT SERPL-MCNC: 6.9 G/DL — SIGNIFICANT CHANGE UP (ref 6–8.3)
RBC # BLD: 4.08 M/UL — SIGNIFICANT CHANGE UP (ref 3.8–5.2)
RBC # FLD: 13.4 % — SIGNIFICANT CHANGE UP (ref 10.3–14.5)
SODIUM SERPL-SCNC: 136 MMOL/L — SIGNIFICANT CHANGE UP (ref 135–145)
WBC # BLD: 6.93 K/UL — SIGNIFICANT CHANGE UP (ref 3.8–10.5)
WBC # FLD AUTO: 6.93 K/UL — SIGNIFICANT CHANGE UP (ref 3.8–10.5)

## 2023-02-01 RX ORDER — GLUCAGON INJECTION, SOLUTION 0.5 MG/.1ML
1 INJECTION, SOLUTION SUBCUTANEOUS ONCE
Refills: 0 | Status: DISCONTINUED | OUTPATIENT
Start: 2023-02-01 | End: 2023-02-03

## 2023-02-01 RX ORDER — DEXTROSE 50 % IN WATER 50 %
12.5 SYRINGE (ML) INTRAVENOUS ONCE
Refills: 0 | Status: DISCONTINUED | OUTPATIENT
Start: 2023-02-01 | End: 2023-02-03

## 2023-02-01 RX ORDER — INSULIN LISPRO 100/ML
VIAL (ML) SUBCUTANEOUS AT BEDTIME
Refills: 0 | Status: DISCONTINUED | OUTPATIENT
Start: 2023-02-01 | End: 2023-02-03

## 2023-02-01 RX ORDER — DEXTROSE 50 % IN WATER 50 %
15 SYRINGE (ML) INTRAVENOUS ONCE
Refills: 0 | Status: DISCONTINUED | OUTPATIENT
Start: 2023-02-01 | End: 2023-02-03

## 2023-02-01 RX ORDER — SODIUM CHLORIDE 9 MG/ML
1000 INJECTION, SOLUTION INTRAVENOUS
Refills: 0 | Status: DISCONTINUED | OUTPATIENT
Start: 2023-02-01 | End: 2023-02-03

## 2023-02-01 RX ORDER — DEXTROSE 50 % IN WATER 50 %
25 SYRINGE (ML) INTRAVENOUS ONCE
Refills: 0 | Status: DISCONTINUED | OUTPATIENT
Start: 2023-02-01 | End: 2023-02-03

## 2023-02-01 RX ORDER — INSULIN LISPRO 100/ML
VIAL (ML) SUBCUTANEOUS
Refills: 0 | Status: DISCONTINUED | OUTPATIENT
Start: 2023-02-01 | End: 2023-02-03

## 2023-02-01 RX ADMIN — CARVEDILOL PHOSPHATE 6.25 MILLIGRAM(S): 80 CAPSULE, EXTENDED RELEASE ORAL at 17:27

## 2023-02-01 RX ADMIN — Medication 1 DROP(S): at 17:27

## 2023-02-01 RX ADMIN — CARVEDILOL PHOSPHATE 6.25 MILLIGRAM(S): 80 CAPSULE, EXTENDED RELEASE ORAL at 05:55

## 2023-02-01 RX ADMIN — Medication 1 DROP(S): at 05:55

## 2023-02-01 RX ADMIN — Medication 3 MILLILITER(S): at 20:17

## 2023-02-01 RX ADMIN — GABAPENTIN 100 MILLIGRAM(S): 400 CAPSULE ORAL at 17:27

## 2023-02-01 RX ADMIN — Medication 3 MILLILITER(S): at 15:42

## 2023-02-01 RX ADMIN — APIXABAN 5 MILLIGRAM(S): 2.5 TABLET, FILM COATED ORAL at 05:55

## 2023-02-01 RX ADMIN — Medication 40 MILLIGRAM(S): at 05:55

## 2023-02-01 RX ADMIN — APIXABAN 5 MILLIGRAM(S): 2.5 TABLET, FILM COATED ORAL at 17:27

## 2023-02-01 RX ADMIN — ANASTROZOLE 1 MILLIGRAM(S): 1 TABLET ORAL at 11:28

## 2023-02-01 RX ADMIN — SENNA PLUS 2 TABLET(S): 8.6 TABLET ORAL at 22:00

## 2023-02-01 RX ADMIN — CEFTRIAXONE 100 MILLIGRAM(S): 500 INJECTION, POWDER, FOR SOLUTION INTRAMUSCULAR; INTRAVENOUS at 05:55

## 2023-02-01 RX ADMIN — GABAPENTIN 100 MILLIGRAM(S): 400 CAPSULE ORAL at 05:56

## 2023-02-01 RX ADMIN — Medication 3 MILLILITER(S): at 09:51

## 2023-02-01 RX ADMIN — Medication 40 MILLIGRAM(S): at 05:56

## 2023-02-01 RX ADMIN — MONTELUKAST 10 MILLIGRAM(S): 4 TABLET, CHEWABLE ORAL at 11:27

## 2023-02-01 RX ADMIN — SIMVASTATIN 20 MILLIGRAM(S): 20 TABLET, FILM COATED ORAL at 22:00

## 2023-02-01 RX ADMIN — Medication 3 MILLILITER(S): at 03:31

## 2023-02-01 NOTE — PROGRESS NOTE ADULT - SUBJECTIVE AND OBJECTIVE BOX
Patient is a 89y old  Female who presents with a chief complaint of AHRF (2023 12:34)  Awake, alert, comfortable in bed in NAD    INTERVAL HPI/OVERNIGHT EVENTS:      VITAL SIGNS:  T(F): 98.8 (23 @ 05:29)  HR: 92 (23 @ 05:29)  BP: 122/85 (23 @ 05:29)  RR: 19 (23 @ 10:44)  SpO2: 87% (23 @ 10:44)  Wt(kg): --  I&O's Detail    2023 07:  -  2023 07:00  --------------------------------------------------------  IN:  Total IN: 0 mL    OUT:    Indwelling Catheter - Urethral (mL): 650 mL  Total OUT: 650 mL    Total NET: -650 mL      2023 07:01  -  2023 12:50  --------------------------------------------------------  IN:  Total IN: 0 mL    OUT:    Indwelling Catheter - Urethral (mL): 1150 mL  Total OUT: 1150 mL    Total NET: -1150 mL              REVIEW OF SYSTEMS:    CONSTITUTIONAL:  No fevers, chills, sweats    HEENT:  Eyes:  No diplopia or blurred vision. ENT:  No earache, sore throat or runny nose.    CARDIOVASCULAR:  No pressure, squeezing, tightness, or heaviness about the chest; no palpitations.    RESPIRATORY:  Per HPI    GASTROINTESTINAL:  No abdominal pain, nausea, vomiting or diarrhea.    GENITOURINARY:  No dysuria, frequency or urgency.    NEUROLOGIC:  No paresthesias, fasciculations, seizures or weakness.    PSYCHIATRIC:  No disorder of thought or mood.      PHYSICAL EXAM:    Constitutional: Well developed and nourished  Eyes:Perrla  ENMT: normal  Neck:supple  Respiratory: good air entry  Cardiovascular: S1 S2 regular  Gastrointestinal: Soft, Non tender  Extremities: + edema  Vascular:normal  Neurological:Awake, alert,Ox3  Musculoskeletal:Normal      MEDICATIONS  (STANDING):  albuterol/ipratropium for Nebulization 3 milliLiter(s) Nebulizer every 6 hours  anastrozole 1 milliGRAM(s) Oral daily  apixaban 5 milliGRAM(s) Oral two times a day  artificial  tears Solution 1 Drop(s) Both EYES two times a day  carvedilol 6.25 milliGRAM(s) Oral every 12 hours  cefTRIAXone   IVPB 1000 milliGRAM(s) IV Intermittent every 24 hours  dextrose 5%. 1000 milliLiter(s) (50 mL/Hr) IV Continuous <Continuous>  dextrose 5%. 1000 milliLiter(s) (100 mL/Hr) IV Continuous <Continuous>  dextrose 50% Injectable 25 Gram(s) IV Push once  dextrose 50% Injectable 12.5 Gram(s) IV Push once  dextrose 50% Injectable 25 Gram(s) IV Push once  furosemide   Injectable 40 milliGRAM(s) IV Push daily  gabapentin 100 milliGRAM(s) Oral two times a day  glucagon  Injectable 1 milliGRAM(s) IntraMuscular once  insulin lispro (ADMELOG) corrective regimen sliding scale   SubCutaneous three times a day before meals  insulin lispro (ADMELOG) corrective regimen sliding scale   SubCutaneous at bedtime  montelukast 10 milliGRAM(s) Oral daily  predniSONE   Tablet 40 milliGRAM(s) Oral daily  simvastatin 20 milliGRAM(s) Oral at bedtime    MEDICATIONS  (PRN):  acetaminophen     Tablet .. 650 milliGRAM(s) Oral every 6 hours PRN Temp greater or equal to 38C (100.4F), Mild Pain (1 - 3)  dextrose Oral Gel 15 Gram(s) Oral once PRN Blood Glucose LESS THAN 70 milliGRAM(s)/deciliter  melatonin 3 milliGRAM(s) Oral at bedtime PRN Insomnia  polyethylene glycol 3350 17 Gram(s) Oral daily PRN Constipation  senna 2 Tablet(s) Oral at bedtime PRN Constipation      Allergies    losartan (Angioedema)    Intolerances    Chicken (Stomach Upset)      LABS:                        12.8   6.93  )-----------( 241      ( 2023 07:50 )             43.3     02-    136  |  94<L>  |  20<H>  ----------------------------<  145<H>  5.2   |  44<H>  |  1.05    Ca    8.9      2023 07:50  Phos  3.2     02-  Mg     2.4     -    TPro  6.9  /  Alb  3.2<L>  /  TBili  0.5  /  DBili  x   /  AST  23  /  ALT  25  /  AlkPhos  102  -    PT/INR - ( 2023 18:00 )   PT: 23.8 sec;   INR: 1.99 ratio         PTT - ( 2023 18:00 )  PTT:34.5 sec  Urinalysis Basic - ( 2023 21:30 )    Color: Yellow / Appearance: very cloudy / S.010 / pH: x  Gluc: x / Ketone: Negative  / Bili: Negative / Urobili: Negative   Blood: x / Protein: 30 mg/dL / Nitrite: Negative   Leuk Esterase: Moderate / RBC: 25-50 /HPF / WBC >50 /HPF   Sq Epi: x / Non Sq Epi: Few /HPF / Bacteria: Moderate /HPF            CAPILLARY BLOOD GLUCOSE      POCT Blood Glucose.: 262 mg/dL (2023 11:11)  POCT Blood Glucose.: 218 mg/dL (2023 07:36)  POCT Blood Glucose.: 157 mg/dL (2023 21:30)  POCT Blood Glucose.: 172 mg/dL (2023 14:43)    pro-bnp 3916  @ 20:20     d-dimer --   @ 20:20      RADIOLOGY & ADDITIONAL TESTS:    CXR:  < from: Xray Chest 1 View- PORTABLE-Urgent (23 @ 22:00) >  Impression:    There are increased interstitial markings which may represent the   patient's underlying COPD or pulmonary venous congestion. No focal   consolidation or gross effusion.      < end of copied text >    Ct scan chest:    ekg;    echo:

## 2023-02-01 NOTE — PROGRESS NOTE ADULT - ASSESSMENT
Patient is 89 year old Female, from home, ambulates with RW, with PMHx HTN, COPD, Afib, HFpEF (7/29/22 EF 55-60%, LVH, GIDD, mild pulmonary HTN), Breast CA, and remote h/o LLE DVT presents with cough and shortness of breath for a weeks duration. Pt was taken to PCP Dr. Bianchi's office who sent her to the ED for admission. Patient admitted to medicine for hypoxia 2/2 COPD exacerbation. CXR: b/l interstitial infiltrates, patient treated with Lasix and IV steroids. EKG: rate controlled afib, cardiology consulted for recommendations.  Patient found to have elevated K+ 5.6 and was given insulin 5U and D50.  Pulmonary consulted started on bronchodilators steroids and Spiriva, recommended pft as outpatient.  AMY PLACEMENT

## 2023-02-01 NOTE — PROGRESS NOTE ADULT - PROBLEM SELECTOR PLAN 2
p/w inc sputum and wheezing  s/p 125mg solumedrol in ED   c/w 40mg PO prednisone for 5 days  plan as above  Pulm Huy consulted.

## 2023-02-01 NOTE — PROGRESS NOTE ADULT - ASSESSMENT
Patient is a 89y old  Female from home, ambulates with RW, with PMHx HTN, COPD, Afib, HFpEF (7/29/22 EF 55-60%, LVH, GIDD, mild pulmonary HTN), Breast CA, and remote h/o LLE DVT, now presents to the ER for evaluation of cough and shortness of breath. She has had dry cough for over a week which became productive and associated with wheezing over the last few days. Pts O2 saturation has been fluctuating in the 80s over the past week and this morning was in the high 70s, per granddaughters.  Pt was taken to PCP Dr. Bianchi's office who sent her to the ER for admission.. On admission, she has no fever, no Leukocytosis, but found to have positive Urine analysis and negative CXR. She has started on Ceftriaxone and The ID consult requested to assist with further evaluation and antibiotic management.      # Metabolic encephalopathy   # UTI  # Hypoxia- on supplemental oxygenation    would recommend:    1. Please obtain Procalcitonin level  2. Follow up Urine and blood cultures  3. CT chest without contrast to further assessment of pneumonia in the setting of hypoxia  4. Continue Ceftriaxone until work up is done  5. Supplemental oxygenation and bronchodilator as needed    d/w Family at the bed side and Covering       Attending Attestation:    Spent more than 65 minutes on total encounter, more than 50 % of the visit was spent counseling and/or coordinating care by the Attending physician.  '  Complexity:    # Metabolic encephalopathy   # UTI  # Hypoxia- on supplemental oxygenation Patient is a 89y old  Female from home, ambulates with RW, with PMHx HTN, COPD, Afib, HFpEF (7/29/22 EF 55-60%, LVH, GIDD, mild pulmonary HTN), Breast CA, and remote h/o LLE DVT, now presents to the ER for evaluation of cough and shortness of breath. She has had dry cough for over a week which became productive and associated with wheezing over the last few days. Pts O2 saturation has been fluctuating in the 80s over the past week and this morning was in the high 70s, per granddaughters.  Pt was taken to PCP Dr. Bianchi's office who sent her to the ER for admission.. On admission, she has no fever, no Leukocytosis, but found to have positive Urine analysis and negative CXR. She has started on Ceftriaxone and The ID consult requested to assist with further evaluation and antibiotic management.    # Metabolic encephalopathy - resolving   # UTI  - Urine Cx grew Klebsiella  # Hypoxia- on supplemental oxygenation    would recommend:    1. Follow up final Urine cultures for sensitivities of Klebsiella  2. Continue Ceftriaxone until work up is done  3. Aspiration precaution   4. Supplemental oxygenation and bronchodilator as needed    d/w Family at the bed side and Covering N[place]amira       Attending Attestation:    Spent more than 45 minutes on total encounter, more than 50 % of the visit was spent counseling and/or coordinating care by the Attending physician.  '  Complexity:    # Metabolic encephalopathy   # UTI  # Hypoxia- on supplemental oxygenation

## 2023-02-01 NOTE — PROGRESS NOTE ADULT - ASSESSMENT
Patient is 89 year old Female, from home, ambulates with RW, with PMHx HTN, COPD, Afib, HFpEF (7/29/22 EF 55-60%, LVH, GIDD, mild pulmonary HTN), Breast CA, and remote h/o LLE DVT presents with cough and shortness of breath for a weeks duration. Pt was taken to PCP Dr. Bianchi's office who sent her to the ED for admission. Patient admitted to medicine for hypoxia 2/2 COPD exacerbation v. CHF exacerbation. CXR: b/l interstitial infiltrates, patient treated with Lasix and IV steroids. EKG: rate controlled afib, cardiology consulted for recommendations.  Patient found to have elevated K+ 5.6 and was given insulin 5U and D50. BNP 3916.  Pulmonary consulted started on bronchodilators steroids and Spiriva, recommended pft as outpatient.

## 2023-02-01 NOTE — PROGRESS NOTE ADULT - SUBJECTIVE AND OBJECTIVE BOX
Patient is a 89y old  Female who presents with a chief complaint of AHRF (2023 11:12)      INTERVAL HPI/OVERNIGHT EVENTS: no overnight events    I&O's Summary    2023 07:01  -  2023 07:00  --------------------------------------------------------  IN: 0 mL / OUT: 650 mL / NET: -650 mL    2023 07:01  -  2023 12:35  --------------------------------------------------------  IN: 0 mL / OUT: 1150 mL / NET: -1150 mL      Vital Signs Last 24 Hrs  T(C): 37.1 (2023 05:29), Max: 37.1 (2023 05:29)  T(F): 98.8 (2023 05:29), Max: 98.8 (2023 05:29)  HR: 92 (2023 05:29) (61 - 92)  BP: 122/85 (2023 05:29) (104/65 - 122/85)  BP(mean): --  RR: 19 (2023 10:44) (18 - 20)  SpO2: 87% (2023 10:44) (87% - 100%)    Parameters below as of 2023 10:44  Patient On (Oxygen Delivery Method): room air      PAST MEDICAL & SURGICAL HISTORY:  Arthritis      Legally blind      Pre-diabetes      Breast cancer  right, no chemo or radiation      COPD exacerbation      Atrial fibrillation      HF (heart failure)  HFpEF       HTN (hypertension)      Deep vein thrombosis (DVT)  Left Lower Extremity      H/O CHF      No significant past surgical history          SOCIAL HISTORY  Alcohol:  Tobacco:  Illicit substance use:      FAMILY HISTORY:      LABS:                        12.8   6.93  )-----------( 241      ( 2023 07:50 )             43.3         136  |  94<L>  |  20<H>  ----------------------------<  145<H>  5.2   |  44<H>  |  1.05    Ca    8.9      2023 07:50  Phos  3.2       Mg     2.4         TPro  6.9  /  Alb  3.2<L>  /  TBili  0.5  /  DBili  x   /  AST  23  /  ALT  25  /  AlkPhos  102      PT/INR - ( 2023 18:00 )   PT: 23.8 sec;   INR: 1.99 ratio         PTT - ( 2023 18:00 )  PTT:34.5 sec  Urinalysis Basic - ( 2023 21:30 )    Color: Yellow / Appearance: very cloudy / S.010 / pH: x  Gluc: x / Ketone: Negative  / Bili: Negative / Urobili: Negative   Blood: x / Protein: 30 mg/dL / Nitrite: Negative   Leuk Esterase: Moderate / RBC: 25-50 /HPF / WBC >50 /HPF   Sq Epi: x / Non Sq Epi: Few /HPF / Bacteria: Moderate /HPF      CAPILLARY BLOOD GLUCOSE      POCT Blood Glucose.: 262 mg/dL (2023 11:11)  POCT Blood Glucose.: 218 mg/dL (2023 07:36)  POCT Blood Glucose.: 157 mg/dL (2023 21:30)  POCT Blood Glucose.: 172 mg/dL (2023 14:43)        Urinalysis Basic - ( 2023 21:30 )    Color: Yellow / Appearance: very cloudy / S.010 / pH: x  Gluc: x / Ketone: Negative  / Bili: Negative / Urobili: Negative   Blood: x / Protein: 30 mg/dL / Nitrite: Negative   Leuk Esterase: Moderate / RBC: 25-50 /HPF / WBC >50 /HPF   Sq Epi: x / Non Sq Epi: Few /HPF / Bacteria: Moderate /HPF        MEDICATIONS  (STANDING):  albuterol/ipratropium for Nebulization 3 milliLiter(s) Nebulizer every 6 hours  anastrozole 1 milliGRAM(s) Oral daily  apixaban 5 milliGRAM(s) Oral two times a day  artificial  tears Solution 1 Drop(s) Both EYES two times a day  carvedilol 6.25 milliGRAM(s) Oral every 12 hours  cefTRIAXone   IVPB 1000 milliGRAM(s) IV Intermittent every 24 hours  dextrose 5%. 1000 milliLiter(s) (50 mL/Hr) IV Continuous <Continuous>  dextrose 5%. 1000 milliLiter(s) (100 mL/Hr) IV Continuous <Continuous>  dextrose 50% Injectable 25 Gram(s) IV Push once  dextrose 50% Injectable 12.5 Gram(s) IV Push once  dextrose 50% Injectable 25 Gram(s) IV Push once  furosemide   Injectable 40 milliGRAM(s) IV Push daily  gabapentin 100 milliGRAM(s) Oral two times a day  glucagon  Injectable 1 milliGRAM(s) IntraMuscular once  insulin lispro (ADMELOG) corrective regimen sliding scale   SubCutaneous three times a day before meals  insulin lispro (ADMELOG) corrective regimen sliding scale   SubCutaneous at bedtime  montelukast 10 milliGRAM(s) Oral daily  predniSONE   Tablet 40 milliGRAM(s) Oral daily  simvastatin 20 milliGRAM(s) Oral at bedtime    MEDICATIONS  (PRN):  acetaminophen     Tablet .. 650 milliGRAM(s) Oral every 6 hours PRN Temp greater or equal to 38C (100.4F), Mild Pain (1 - 3)  dextrose Oral Gel 15 Gram(s) Oral once PRN Blood Glucose LESS THAN 70 milliGRAM(s)/deciliter  melatonin 3 milliGRAM(s) Oral at bedtime PRN Insomnia  polyethylene glycol 3350 17 Gram(s) Oral daily PRN Constipation  senna 2 Tablet(s) Oral at bedtime PRN Constipation      REVIEW OF SYSTEMS: limited  NECK: No pain   RESPIRATORY: No cough, No shortness of breath  CARDIOVASCULAR: No chest pain  GASTROINTESTINAL: No abdominal  pain.   GENITOURINARY: No dysuria  NEUROLOGICAL: No headaches  MUSCULOSKELETAL: No joint pain     RADIOLOGY & ADDITIONAL TESTS:  < from: Xray Chest 1 View- PORTABLE-Urgent (23 @ 22:00) >    ACC: [id number] EXAM:  XR CHEST PORTABLE URGENT 1V   ORDERED BY: PAOLA VU     PROCEDURE DATE:  2023          INTERPRETATION:  INDICATION: COPD exacerbation    Portable chest 7:20 PM    COMPARISON: 2023    FINDINGS:  Heart/Vascular: The mediastinum, hilum and aorta are within normal limits   for projection. The heart appears enlarged despite projection.  Pulmonary: Midline trachea. There are increased interstitial markings   which may represent the patient's underlying COPD or pulmonary venous   congestion. No focal consolidation or gross effusion.    Bones: There is no fracture.  Lines and catheter: None    Impression:    There are increased interstitial markings which may represent the   patient's underlying COPD or pulmonary venous congestion. No focal   consolidation or gross effusion.    --- End of Report ---       ASYA HYDE DO; Attending Radiologist  This document has been electronically signed. 2023  9:04AM    < end of copied text >    ACC: [id number] EXAM:  CT CHEST   ORDERED BY: FIONA PRETTY     PROCEDURE DATE:  2023          INTERPRETATION:  HISTORY: Admitting Dxs: J44.1 CHRONIC OBSTRUCTIVE   PULMONARY DISEASE W (ACUTE) EXACERBATION    EXAMINATION: CT CHEST was performed without IV contrast.    COMPARISON: 2022.    FINDINGS:    Image quality degraded due to extensive respiratory motion.    AIRWAYS, LUNGS, PLEURA: Trachea and mainstem bronchi patent. Biapical   scarring unchanged. Bibasilar atelectasis. No focal lung consolidation.   No pleural effusion.    MEDIASTINUM: Cardiomegaly. No pericardial effusion. Thoracic aorta normal   caliber.  No large mediastinal lymph nodes.    IMAGED ABDOMEN: Unremarkable.    SOFT TISSUES: Unremarkable.    BONES: Unremarkable.      IMPRESSION:.    No focal lung consolidation.    Bibasilar atelectasis.    --- End of Report ---       TOM COATES MD; Attending Radiologist  This document has been electronically signed. 2023  4:45PM    Imaging Personally Reviewed:  [x ] YES  [ ] NO    Consultant(s) Notes Reviewed:  [x ] YES  [ ] NO    PHYSICAL EXAM:  GENERAL: NAD  HEAD:  Atraumatic, Normocephalic  ENMT:  Moist mucous membranes,  NECK: Supple  NERVOUS SYSTEM:  Alert   CHEST/LUNG: CTA bilaterally; No rales, rhonchi, wheezing, or rubs  HEART: Regular rate and Irregular rhythm; No murmurs, rubs, or gallops  ABDOMEN: Soft, Nontender, Nondistended; Bowel sounds present  EXTREMITIES:  2+ Peripheral Pulses, + edema  SKIN: No rashes     Care Collaborated Discussed with Consultants/Other Providers [x ] YES  [ ] NO

## 2023-02-01 NOTE — PROGRESS NOTE ADULT - SUBJECTIVE AND OBJECTIVE BOX
Patient is seen and examined at the bed side, is afebrile.      REVIEW OF SYSTEMS: Unable to obtain due to mental status       ALLERGIES: Chicken (Stomach Upset)  losartan (Angioedema)        Vital Signs Last 24 Hrs  T(C): 37.1 (2023 05:29), Max: 37.1 (2023 05:29)  T(F): 98.8 (2023 05:29), Max: 98.8 (2023 05:29)  HR: 92 (2023 05:29) (61 - 92)  BP: 122/85 (2023 05:29) (104/65 - 122/85)  BP(mean): --  RR: 19 (2023 10:44) (18 - 20)  SpO2: 87% (2023 10:44) (87% - 100%)    Parameters below as of 2023 10:44  Patient On (Oxygen Delivery Method): room air        PHYSICAL EXAM:  GENERAL: Not in distress, on oxygen via NC  CHEST/LUNG:  Not using accessory muscles   HEART: s1 and s2 present  ABDOMEN:  Nontender and  Nondistended  EXTREMITIES: No pedal  edema  CNS: Lethargic       LABS:                        12.8   6.93  )-----------( 241      ( 2023 07:50 )             43.3                           13.2   6.71  )-----------( 240      ( 2023 05:55 )             45.0         02-    136  |  94<L>  |  20<H>  ----------------------------<  145<H>  5.2   |  44<H>  |  1.05    Ca    8.9      2023 07:50  Phos  3.2     02-  Mg     2.4     -    TPro  6.9  /  Alb  3.2<L>  /  TBili  0.5  /  DBili  x   /  AST  23  /  ALT  25  /  AlkPhos  102  02-    136  |  92<L>  |  20<H>  ----------------------------<  235<H>  5.0   |  40<H>  |  1.21    Ca    8.6      2023 05:55    TPro  7.5  /  Alb  3.2<L>  /  TBili  0.6  /  DBili  x   /  AST  41<H>  /  ALT  29  /  AlkPhos  112      PT/INR - ( 2023 18:00 )   PT: 23.8 sec;   INR: 1.99 ratio      PTT - ( 2023 18:00 )  PTT:34.5 sec      CAPILLARY BLOOD GLUCOSE  POCT Blood Glucose.: 250 mg/dL (2023 01:11)  POCT Blood Glucose.: 176 mg/dL (2023 23:47)  POCT Blood Glucose.: 176 mg/dL (2023 16:31)        Urinalysis Basic - ( 2023 21:30 )  Color: Yellow / Appearance: very cloudy / S.010 / pH: x  Gluc: x / Ketone: Negative  / Bili: Negative / Urobili: Negative   Blood: x / Protein: 30 mg/dL / Nitrite: Negative   Leuk Esterase: Moderate / RBC: 25-50 /HPF / WBC >50 /HPF   Sq Epi: x / Non Sq Epi: Few /HPF / Bacteria: Moderate /HPF        MEDICATIONS  (STANDING):    albuterol/ipratropium for Nebulization 3 milliLiter(s) Nebulizer every 6 hours  anastrozole 1 milliGRAM(s) Oral daily  apixaban 5 milliGRAM(s) Oral two times a day  artificial  tears Solution 1 Drop(s) Both EYES two times a day  carvedilol 6.25 milliGRAM(s) Oral every 12 hours  cefTRIAXone   IVPB 1000 milliGRAM(s) IV Intermittent every 24 hours  furosemide   Injectable 40 milliGRAM(s) IV Push daily  gabapentin 100 milliGRAM(s) Oral two times a day  glucagon  Injectable 1 milliGRAM(s) IntraMuscular once  insulin lispro (ADMELOG) corrective regimen sliding scale   SubCutaneous three times a day before meals  insulin lispro (ADMELOG) corrective regimen sliding scale   SubCutaneous at bedtime  montelukast 10 milliGRAM(s) Oral daily  predniSONE   Tablet 40 milliGRAM(s) Oral daily  simvastatin 20 milliGRAM(s) Oral at bedtime          RADIOLOGY & ADDITIONAL TESTS:    23: Xray Chest 1 View- PORTABLE-Urgent (23 @ 22:00) There are increased interstitial markings which may represent the   patient's underlying COPD or pulmonary venous congestion. No focal  consolidation or gross effusion.        MICROBIOLOGY DATA:      Culture - Urine (23 @ 21:30)   Specimen Source: Clean Catch Clean Catch (Midstream)   Culture Results:   >100,000 CFU/ml Klebsiella aerogenes (Previously Enterobacter)     Culture - Blood (23 @ 18:00)   Specimen Source: .Blood Blood-Peripheral   Culture Results: No growth to date.     Culture - Blood (23 @ 17:50)   Specimen Source: .Blood Blood-Peripheral   Culture Results: No growth to date.     Flu With COVID-19 By PATRICK (23 @ 18:00)   SARS-CoV-2 Result: NotDetec               Patient is seen and examined at the bed side, is afebrile. She is doing better, is responsive to verbal stimuli. The Blood cultures  have no growth to date and Urine culture grew Klebsiella.       REVIEW OF SYSTEMS: All other review systems are negative        ALLERGIES: Chicken (Stomach Upset)  losartan (Angioedema)        Vital Signs Last 24 Hrs  T(C): 37.1 (2023 05:29), Max: 37.1 (2023 05:29)  T(F): 98.8 (2023 05:29), Max: 98.8 (2023 05:29)  HR: 92 (2023 05:29) (61 - 92)  BP: 122/85 (2023 05:29) (104/65 - 122/85)  BP(mean): --  RR: 19 (2023 10:44) (18 - 20)  SpO2: 87% (2023 10:44) (87% - 100%)    Parameters below as of 2023 10:44  Patient On (Oxygen Delivery Method): room air        PHYSICAL EXAM:  GENERAL: Not in distress, on oxygen via NC  CHEST/LUNG:  Not using accessory muscles   HEART: s1 and s2 present  ABDOMEN:  Nontender and  Nondistended  EXTREMITIES: No pedal  edema  CNS: Lethargic       LABS:                        12.8   6.93  )-----------( 241      ( 2023 07:50 )             43.3                           13.2   6.71  )-----------( 240      ( 2023 05:55 )             45.0         -    136  |  94<L>  |  20<H>  ----------------------------<  145<H>  5.2   |  44<H>  |  1.05    Ca    8.9      2023 07:50  Phos  3.2     02-  Mg     2.4     -    TPro  6.9  /  Alb  3.2<L>  /  TBili  0.5  /  DBili  x   /  AST  23  /  ALT  25  /  AlkPhos  102  -    136  |  92<L>  |  20<H>  ----------------------------<  235<H>  5.0   |  40<H>  |  1.21    Ca    8.6      2023 05:55    TPro  7.5  /  Alb  3.2<L>  /  TBili  0.6  /  DBili  x   /  AST  41<H>  /  ALT  29  /  AlkPhos  112  01-30    PT/INR - ( 2023 18:00 )   PT: 23.8 sec;   INR: 1.99 ratio      PTT - ( 2023 18:00 )  PTT:34.5 sec      CAPILLARY BLOOD GLUCOSE  POCT Blood Glucose.: 250 mg/dL (2023 01:11)  POCT Blood Glucose.: 176 mg/dL (2023 23:47)  POCT Blood Glucose.: 176 mg/dL (2023 16:31)        Urinalysis Basic - ( 2023 21:30 )  Color: Yellow / Appearance: very cloudy / S.010 / pH: x  Gluc: x / Ketone: Negative  / Bili: Negative / Urobili: Negative   Blood: x / Protein: 30 mg/dL / Nitrite: Negative   Leuk Esterase: Moderate / RBC: 25-50 /HPF / WBC >50 /HPF   Sq Epi: x / Non Sq Epi: Few /HPF / Bacteria: Moderate /HPF        MEDICATIONS  (STANDING):    albuterol/ipratropium for Nebulization 3 milliLiter(s) Nebulizer every 6 hours  anastrozole 1 milliGRAM(s) Oral daily  apixaban 5 milliGRAM(s) Oral two times a day  artificial  tears Solution 1 Drop(s) Both EYES two times a day  carvedilol 6.25 milliGRAM(s) Oral every 12 hours  cefTRIAXone   IVPB 1000 milliGRAM(s) IV Intermittent every 24 hours  furosemide   Injectable 40 milliGRAM(s) IV Push daily  gabapentin 100 milliGRAM(s) Oral two times a day  glucagon  Injectable 1 milliGRAM(s) IntraMuscular once  insulin lispro (ADMELOG) corrective regimen sliding scale   SubCutaneous three times a day before meals  insulin lispro (ADMELOG) corrective regimen sliding scale   SubCutaneous at bedtime  montelukast 10 milliGRAM(s) Oral daily  predniSONE   Tablet 40 milliGRAM(s) Oral daily  simvastatin 20 milliGRAM(s) Oral at bedtime        RADIOLOGY & ADDITIONAL TESTS:    23: Xray Chest 1 View- PORTABLE-Urgent (23 @ 22:00) There are increased interstitial markings which may represent the   patient's underlying COPD or pulmonary venous congestion. No focal  consolidation or gross effusion.        MICROBIOLOGY DATA:    Culture - Urine (23 @ 21:30)   Specimen Source: Clean Catch Clean Catch (Midstream)   Culture Results:   >100,000 CFU/ml Klebsiella aerogenes (Previously Enterobacter)     Culture - Blood (23 @ 18:00)   Specimen Source: .Blood Blood-Peripheral   Culture Results: No growth to date.     Culture - Blood (23 @ 17:50)   Specimen Source: .Blood Blood-Peripheral   Culture Results: No growth to date.     Flu With COVID-19 By PATRICK (23 @ 18:00)   SARS-CoV-2 Result: NotDetec

## 2023-02-01 NOTE — PROGRESS NOTE ADULT - PROBLEM SELECTOR PLAN 1
pt p/w cough, SOB, wheezing and hypoxia   CXR with interstitial infiltrates  ct chest- no consolidations  likely CHF v. COPD exacerbation  noted b/l lower ext edema   c/w IV lasix and po steriods and nebs  supplement O2; room air 89%  monitor oxygen sat  Pulm dr. Son  ID dr. Denton

## 2023-02-01 NOTE — PROGRESS NOTE ADULT - SUBJECTIVE AND OBJECTIVE BOX
CHIEF COMPLAINT:Patient is a 89y old  Female who presents with a chief complaint of AHRF.Pt appears comfortable.    	  REVIEW OF SYSTEMS:  CONSTITUTIONAL: No fever, weight loss, or fatigue  EYES: No eye pain, visual disturbances, or discharge  ENT:  No difficulty hearing, tinnitus, vertigo; No sinus or throat pain  NECK: No pain or stiffness  RESPIRATORY: No cough, wheezing, chills or hemoptysis; No Shortness of Breath  CARDIOVASCULAR: No chest pain, palpitations, passing out, dizziness, + leg swelling  GASTROINTESTINAL: No abdominal or epigastric pain. No nausea, vomiting, or hematemesis; No diarrhea or constipation. No melena or hematochezia.  GENITOURINARY: No dysuria, frequency, hematuria, or incontinence  NEUROLOGICAL: No headaches, memory loss, loss of strength, numbness, or tremors  SKIN: No itching, burning, rashes, or lesions   LYMPH Nodes: No enlarged glands  ENDOCRINE: No heat or cold intolerance; No hair loss  MUSCULOSKELETAL: No joint pain or swelling; No muscle, back, or extremity pain  PSYCHIATRIC: No depression, anxiety, mood swings, or difficulty sleeping  HEME/LYMPH: No easy bruising, or bleeding gums  ALLERGY AND IMMUNOLOGIC: No hives or eczema	        PHYSICAL EXAM:  T(C): 37.1 (02-01-23 @ 05:29), Max: 37.1 (02-01-23 @ 05:29)  HR: 92 (02-01-23 @ 05:29) (61 - 92)  BP: 122/85 (02-01-23 @ 05:29) (104/65 - 122/85)  RR: 20 (02-01-23 @ 05:29) (18 - 20)  SpO2: 100% (02-01-23 @ 05:29) (96% - 100%)  Wt(kg): --  I&O's Summary    31 Jan 2023 07:01  -  01 Feb 2023 07:00  --------------------------------------------------------  IN: 0 mL / OUT: 650 mL / NET: -650 mL    01 Feb 2023 07:01  -  01 Feb 2023 11:12  --------------------------------------------------------  IN: 0 mL / OUT: 800 mL / NET: -800 mL        Appearance: Normal	  HEENT:   Normal oral mucosa, PERRL, EOMI	  Lymphatic: No lymphadenopathy  Cardiovascular: Normal S1 S2, No JVD, No murmurs, +1 edema  Respiratory: Lungs clear to auscultation	  Psychiatry: A & O x 3, Mood & affect appropriate  Gastrointestinal:  Soft, Non-tender, + BS	  Skin: No rashes, No ecchymoses, No cyanosis	  Neurologic: Non-focal  Extremities: Normal range of motion, No clubbing, cyanosis +1 edema  Vascular: Peripheral pulses palpable 2+ bilaterally    MEDICATIONS  (STANDING):  albuterol/ipratropium for Nebulization 3 milliLiter(s) Nebulizer every 6 hours  anastrozole 1 milliGRAM(s) Oral daily  apixaban 5 milliGRAM(s) Oral two times a day  artificial  tears Solution 1 Drop(s) Both EYES two times a day  carvedilol 6.25 milliGRAM(s) Oral every 12 hours  cefTRIAXone   IVPB 1000 milliGRAM(s) IV Intermittent every 24 hours  furosemide   Injectable 40 milliGRAM(s) IV Push daily  gabapentin 100 milliGRAM(s) Oral two times a day  montelukast 10 milliGRAM(s) Oral daily  predniSONE   Tablet 40 milliGRAM(s) Oral daily  simvastatin 20 milliGRAM(s) Oral at bedtime  	  	  LABS:	 	      Troponin I, High Sensitivity Result: 110.9 ng/L (01-30 @ 20:20)                            12.8   6.93  )-----------( 241      ( 01 Feb 2023 07:50 )             43.3     02-01    136  |  94<L>  |  20<H>  ----------------------------<  145<H>  5.2   |  44<H>  |  1.05    Ca    8.9      01 Feb 2023 07:50  Phos  3.2     02-01  Mg     2.4     02-01    TPro  6.9  /  Alb  3.2<L>  /  TBili  0.5  /  DBili  x   /  AST  23  /  ALT  25  /  AlkPhos  102  02-01    proBNP: Serum Pro-Brain Natriuretic Peptide: 3916 pg/mL (01-30 @ 20:20)  Serum Pro-Brain Natriuretic Peptide: 1943 pg/mL (01-11 @ 16:58)    < from: CT Chest No Cont (01.31.23 @ 16:36) >  ACC: [id number] EXAM:  CT CHEST   ORDERED BY: FIONA PRETTY     PROCEDURE DATE:  01/31/2023          INTERPRETATION:  HISTORY: Admitting Dxs: J44.1 CHRONIC OBSTRUCTIVE   PULMONARY DISEASE W (ACUTE) EXACERBATION    EXAMINATION: CT CHEST was performed without IV contrast.    COMPARISON: 7/28/2022.    FINDINGS:    Image quality degraded due to extensive respiratory motion.    AIRWAYS, LUNGS, PLEURA: Trachea and mainstem bronchi patent. Biapical   scarring unchanged. Bibasilar atelectasis. No focal lung consolidation.   No pleural effusion.    MEDIASTINUM: Cardiomegaly. No pericardial effusion. Thoracic aorta normal   caliber.  No large mediastinal lymph nodes.    IMAGED ABDOMEN: Unremarkable.    SOFT TISSUES: Unremarkable.    BONES: Unremarkable.      IMPRESSION:.    No focal lung consolidation.    Bibasilar atelectasis.    < end of copied text >

## 2023-02-01 NOTE — PROGRESS NOTE ADULT - PROBLEM SELECTOR PLAN 3
CXR with interstitial infiltrates, pt with significant edema b/l lower ext  Echo 7/22: EF wnl, GIDD  BNP 3916  s/p 40 IV lasix in ED  c/w 40mg IV lasix qd   long placed in ED, removed 2/1/23 for TOV  strict Is and Os   Cardio [place]

## 2023-02-01 NOTE — PROGRESS NOTE ADULT - SUBJECTIVE AND OBJECTIVE BOX
Patient was seen and examined  Patient is a 89y old  Female who presents with a chief complaint of AHRF (2023 16:16)      INTERVAL HPI/OVERNIGHT EVENTS:  T(C): 37.1 (23 @ 05:29), Max: 37.1 (23 @ 05:29)  HR: 92 (23 @ 05:29) (61 - 95)  BP: 122/85 (23 @ 05:29) (104/65 - 122/85)  RR: 20 (23 @ 05:29) (18 - 20)  SpO2: 100% (23 @ 05:29) (96% - 100%)  Wt(kg): --  I&O's Summary    2023 07:01  -  2023 07:00  --------------------------------------------------------  IN: 0 mL / OUT: 650 mL / NET: -650 mL    2023 07:01  -  2023 09:46  --------------------------------------------------------  IN: 0 mL / OUT: 800 mL / NET: -800 mL        LABS:                        12.8   6.93  )-----------( 241      ( 2023 07:50 )             43.3     02-01    136  |  94<L>  |  20<H>  ----------------------------<  145<H>  5.2   |  44<H>  |  1.05    Ca    8.9      2023 07:50  Phos  3.2     02-01  Mg     2.4     02-    TPro  6.9  /  Alb  3.2<L>  /  TBili  0.5  /  DBili  x   /  AST  23  /  ALT  25  /  AlkPhos  102  02-01    PT/INR - ( 2023 18:00 )   PT: 23.8 sec;   INR: 1.99 ratio         PTT - ( 2023 18:00 )  PTT:34.5 sec  Urinalysis Basic - ( 2023 21:30 )    Color: Yellow / Appearance: very cloudy / S.010 / pH: x  Gluc: x / Ketone: Negative  / Bili: Negative / Urobili: Negative   Blood: x / Protein: 30 mg/dL / Nitrite: Negative   Leuk Esterase: Moderate / RBC: 25-50 /HPF / WBC >50 /HPF   Sq Epi: x / Non Sq Epi: Few /HPF / Bacteria: Moderate /HPF      CAPILLARY BLOOD GLUCOSE      POCT Blood Glucose.: 218 mg/dL (2023 07:36)  POCT Blood Glucose.: 157 mg/dL (2023 21:30)  POCT Blood Glucose.: 172 mg/dL (2023 14:43)          Urinalysis Basic - ( 2023 21:30 )    Color: Yellow / Appearance: very cloudy / S.010 / pH: x  Gluc: x / Ketone: Negative  / Bili: Negative / Urobili: Negative   Blood: x / Protein: 30 mg/dL / Nitrite: Negative   Leuk Esterase: Moderate / RBC: 25-50 /HPF / WBC >50 /HPF   Sq Epi: x / Non Sq Epi: Few /HPF / Bacteria: Moderate /HPF        MEDICATIONS  (STANDING):  albuterol/ipratropium for Nebulization 3 milliLiter(s) Nebulizer every 6 hours  anastrozole 1 milliGRAM(s) Oral daily  apixaban 5 milliGRAM(s) Oral two times a day  artificial  tears Solution 1 Drop(s) Both EYES two times a day  carvedilol 6.25 milliGRAM(s) Oral every 12 hours  cefTRIAXone   IVPB 1000 milliGRAM(s) IV Intermittent every 24 hours  furosemide   Injectable 40 milliGRAM(s) IV Push daily  gabapentin 100 milliGRAM(s) Oral two times a day  montelukast 10 milliGRAM(s) Oral daily  predniSONE   Tablet 40 milliGRAM(s) Oral daily  simvastatin 20 milliGRAM(s) Oral at bedtime    MEDICATIONS  (PRN):  acetaminophen     Tablet .. 650 milliGRAM(s) Oral every 6 hours PRN Temp greater or equal to 38C (100.4F), Mild Pain (1 - 3)  melatonin 3 milliGRAM(s) Oral at bedtime PRN Insomnia  polyethylene glycol 3350 17 Gram(s) Oral daily PRN Constipation  senna 2 Tablet(s) Oral at bedtime PRN Constipation      RADIOLOGY & ADDITIONAL TESTS:    Imaging Personally Reviewed:  [ ] YES  [ ] NO    REVIEW OF SYSTEMS:  CONSTITUTIONAL: No fever, weight loss, or fatigue  EYES: No eye pain, visual disturbances, or discharge  ENMT:  No difficulty hearing, tinnitus, vertigo; No sinus or throat pain  NECK: No pain or stiffness  BREASTS: No pain, masses, or nipple discharge  RESPIRATORY: No cough, wheezing, chills or hemoptysis; No shortness of breath  CARDIOVASCULAR: No chest pain, palpitations, dizziness, or leg swelling  GASTROINTESTINAL: No abdominal or epigastric pain. No nausea, vomiting, or hematemesis; No diarrhea or constipation. No melena or hematochezia.  GENITOURINARY: No dysuria, frequency, hematuria, or incontinence  NEUROLOGICAL: No headaches, memory loss, loss of strength, numbness, or tremors  SKIN: No itching, burning, rashes, or lesions   LYMPH NODES: No enlarged glands  ENDOCRINE: No heat or cold intolerance; No hair loss  MUSCULOSKELETAL: No joint pain or swelling; No muscle, back, or extremity pain  PSYCHIATRIC: No depression, anxiety, mood swings, or difficulty sleeping  HEME/LYMPH: No easy bruising, or bleeding gums  ALLERY AND IMMUNOLOGIC: No hives or eczema      Consultant(s) Notes Reviewed:  [ x ] YES  [ ] NO    PHYSICAL EXAM:  GENERAL: NAD, well-groomed, well-developed  HEAD:  Atraumatic, Normocephalic  EYES: EOMI, PERRLA, conjunctiva and sclera clear  ENMT: No tonsillar erythema, exudates, or enlargement; Moist mucous membranes, Good dentition, No lesions  NECK: Supple, No JVD, Normal thyroid  NERVOUS SYSTEM:  Alert & Oriented X3, Good concentration  poor gait   CHEST/LUNG: bl rhonchi   HEART: systolic murmur   ABDOMEN: Soft, Nontender, Nondistended; Bowel sounds present  EXTREMITIES:  poor gait edema  LYMPH: No lymphadenopathy noted  SKIN: No rashes or lesions    Care Discussed with Consultants/Other Providers [ x] YES  [ ] NO

## 2023-02-01 NOTE — PROGRESS NOTE ADULT - ASSESSMENT
89 year old Female, from home, ambulates with RW, with PMHx HTN, COPD, Afib, HFpEF (7/29/22 EF 55-60%, LVH, GIDD, mild pulmonary HTN), Breast CA, and remote h/o LLE DVT presents with cough and shortness of breath for a weeks duration,acute diastolic HF.  1.Acute diastolic HF-IV lasix.  2.Afib-eliquis,coreg.  3.COPD-Pulm f/u.  4.HTN-coreg.  5.PPI.  6.Sleep study-r/o giana.

## 2023-02-02 ENCOUNTER — TRANSCRIPTION ENCOUNTER (OUTPATIENT)
Age: 88
End: 2023-02-02

## 2023-02-02 DIAGNOSIS — Z02.9 ENCOUNTER FOR ADMINISTRATIVE EXAMINATIONS, UNSPECIFIED: ICD-10-CM

## 2023-02-02 DIAGNOSIS — Z91.89 OTHER SPECIFIED PERSONAL RISK FACTORS, NOT ELSEWHERE CLASSIFIED: ICD-10-CM

## 2023-02-02 LAB
-  AMIKACIN: SIGNIFICANT CHANGE UP
-  AMOXICILLIN/CLAVULANIC ACID: SIGNIFICANT CHANGE UP
-  AMPICILLIN/SULBACTAM: SIGNIFICANT CHANGE UP
-  AMPICILLIN: SIGNIFICANT CHANGE UP
-  AZTREONAM: SIGNIFICANT CHANGE UP
-  CEFAZOLIN: SIGNIFICANT CHANGE UP
-  CEFEPIME: SIGNIFICANT CHANGE UP
-  CEFOXITIN: SIGNIFICANT CHANGE UP
-  CEFTRIAXONE: SIGNIFICANT CHANGE UP
-  CIPROFLOXACIN: SIGNIFICANT CHANGE UP
-  ERTAPENEM: SIGNIFICANT CHANGE UP
-  GENTAMICIN: SIGNIFICANT CHANGE UP
-  IMIPENEM: SIGNIFICANT CHANGE UP
-  LEVOFLOXACIN: SIGNIFICANT CHANGE UP
-  MEROPENEM: SIGNIFICANT CHANGE UP
-  NITROFURANTOIN: SIGNIFICANT CHANGE UP
-  PIPERACILLIN/TAZOBACTAM: SIGNIFICANT CHANGE UP
-  TOBRAMYCIN: SIGNIFICANT CHANGE UP
-  TRIMETHOPRIM/SULFAMETHOXAZOLE: SIGNIFICANT CHANGE UP
A1C WITH ESTIMATED AVERAGE GLUCOSE RESULT: 7.6 % — HIGH (ref 4–5.6)
ANION GAP SERPL CALC-SCNC: -1 MMOL/L — LOW (ref 5–17)
ANION GAP SERPL CALC-SCNC: 5 MMOL/L — SIGNIFICANT CHANGE UP (ref 5–17)
BUN SERPL-MCNC: 21 MG/DL — HIGH (ref 7–18)
BUN SERPL-MCNC: 22 MG/DL — HIGH (ref 7–18)
CALCIUM SERPL-MCNC: 8.5 MG/DL — SIGNIFICANT CHANGE UP (ref 8.4–10.5)
CALCIUM SERPL-MCNC: 9.2 MG/DL — SIGNIFICANT CHANGE UP (ref 8.4–10.5)
CHLORIDE SERPL-SCNC: 90 MMOL/L — LOW (ref 96–108)
CHLORIDE SERPL-SCNC: 94 MMOL/L — LOW (ref 96–108)
CO2 SERPL-SCNC: 37 MMOL/L — HIGH (ref 22–31)
CO2 SERPL-SCNC: 45 MMOL/L — CRITICAL HIGH (ref 22–31)
CREAT SERPL-MCNC: 0.94 MG/DL — SIGNIFICANT CHANGE UP (ref 0.5–1.3)
CREAT SERPL-MCNC: 0.98 MG/DL — SIGNIFICANT CHANGE UP (ref 0.5–1.3)
CULTURE RESULTS: SIGNIFICANT CHANGE UP
EGFR: 54 ML/MIN/1.73M2 — LOW
EGFR: 57 ML/MIN/1.73M2 — LOW
ESTIMATED AVERAGE GLUCOSE: 171 MG/DL — HIGH (ref 68–114)
GLUCOSE BLDC GLUCOMTR-MCNC: 134 MG/DL — HIGH (ref 70–99)
GLUCOSE BLDC GLUCOMTR-MCNC: 138 MG/DL — HIGH (ref 70–99)
GLUCOSE BLDC GLUCOMTR-MCNC: 193 MG/DL — HIGH (ref 70–99)
GLUCOSE BLDC GLUCOMTR-MCNC: 266 MG/DL — HIGH (ref 70–99)
GLUCOSE SERPL-MCNC: 143 MG/DL — HIGH (ref 70–99)
GLUCOSE SERPL-MCNC: 155 MG/DL — HIGH (ref 70–99)
HCT VFR BLD CALC: 40.8 % — SIGNIFICANT CHANGE UP (ref 34.5–45)
HGB BLD-MCNC: 12.5 G/DL — SIGNIFICANT CHANGE UP (ref 11.5–15.5)
MAGNESIUM SERPL-MCNC: 2.6 MG/DL — SIGNIFICANT CHANGE UP (ref 1.6–2.6)
MCHC RBC-ENTMCNC: 30.6 GM/DL — LOW (ref 32–36)
MCHC RBC-ENTMCNC: 31.5 PG — SIGNIFICANT CHANGE UP (ref 27–34)
MCV RBC AUTO: 102.8 FL — HIGH (ref 80–100)
METHOD TYPE: SIGNIFICANT CHANGE UP
NRBC # BLD: 0 /100 WBCS — SIGNIFICANT CHANGE UP (ref 0–0)
ORGANISM # SPEC MICROSCOPIC CNT: SIGNIFICANT CHANGE UP
ORGANISM # SPEC MICROSCOPIC CNT: SIGNIFICANT CHANGE UP
PHOSPHATE SERPL-MCNC: 2.6 MG/DL — SIGNIFICANT CHANGE UP (ref 2.5–4.5)
PLATELET # BLD AUTO: 229 K/UL — SIGNIFICANT CHANGE UP (ref 150–400)
POTASSIUM SERPL-MCNC: 5.4 MMOL/L — HIGH (ref 3.5–5.3)
POTASSIUM SERPL-MCNC: 7.3 MMOL/L — CRITICAL HIGH (ref 3.5–5.3)
POTASSIUM SERPL-SCNC: 5.4 MMOL/L — HIGH (ref 3.5–5.3)
POTASSIUM SERPL-SCNC: 7.3 MMOL/L — CRITICAL HIGH (ref 3.5–5.3)
RBC # BLD: 3.97 M/UL — SIGNIFICANT CHANGE UP (ref 3.8–5.2)
RBC # FLD: 13.6 % — SIGNIFICANT CHANGE UP (ref 10.3–14.5)
SODIUM SERPL-SCNC: 134 MMOL/L — LOW (ref 135–145)
SODIUM SERPL-SCNC: 136 MMOL/L — SIGNIFICANT CHANGE UP (ref 135–145)
SPECIMEN SOURCE: SIGNIFICANT CHANGE UP
WBC # BLD: 8.23 K/UL — SIGNIFICANT CHANGE UP (ref 3.8–10.5)
WBC # FLD AUTO: 8.23 K/UL — SIGNIFICANT CHANGE UP (ref 3.8–10.5)

## 2023-02-02 RX ORDER — FUROSEMIDE 40 MG
20 TABLET ORAL DAILY
Refills: 0 | Status: DISCONTINUED | OUTPATIENT
Start: 2023-02-03 | End: 2023-02-03

## 2023-02-02 RX ORDER — SODIUM ZIRCONIUM CYCLOSILICATE 10 G/10G
10 POWDER, FOR SUSPENSION ORAL DAILY
Refills: 0 | Status: DISCONTINUED | OUTPATIENT
Start: 2023-02-02 | End: 2023-02-02

## 2023-02-02 RX ORDER — SODIUM ZIRCONIUM CYCLOSILICATE 10 G/10G
10 POWDER, FOR SUSPENSION ORAL ONCE
Refills: 0 | Status: COMPLETED | OUTPATIENT
Start: 2023-02-02 | End: 2023-02-02

## 2023-02-02 RX ORDER — ACETAZOLAMIDE 250 MG/1
250 TABLET ORAL
Refills: 0 | Status: COMPLETED | OUTPATIENT
Start: 2023-02-02 | End: 2023-02-03

## 2023-02-02 RX ORDER — TIOTROPIUM BROMIDE 18 UG/1
2 CAPSULE ORAL; RESPIRATORY (INHALATION) DAILY
Refills: 0 | Status: DISCONTINUED | OUTPATIENT
Start: 2023-02-02 | End: 2023-02-02

## 2023-02-02 RX ADMIN — ACETAZOLAMIDE 250 MILLIGRAM(S): 250 TABLET ORAL at 14:13

## 2023-02-02 RX ADMIN — Medication 40 MILLIGRAM(S): at 06:12

## 2023-02-02 RX ADMIN — APIXABAN 5 MILLIGRAM(S): 2.5 TABLET, FILM COATED ORAL at 17:52

## 2023-02-02 RX ADMIN — CEFTRIAXONE 100 MILLIGRAM(S): 500 INJECTION, POWDER, FOR SOLUTION INTRAMUSCULAR; INTRAVENOUS at 06:09

## 2023-02-02 RX ADMIN — SIMVASTATIN 20 MILLIGRAM(S): 20 TABLET, FILM COATED ORAL at 21:40

## 2023-02-02 RX ADMIN — Medication 40 MILLIGRAM(S): at 06:09

## 2023-02-02 RX ADMIN — ANASTROZOLE 1 MILLIGRAM(S): 1 TABLET ORAL at 11:37

## 2023-02-02 RX ADMIN — MONTELUKAST 10 MILLIGRAM(S): 4 TABLET, CHEWABLE ORAL at 11:37

## 2023-02-02 RX ADMIN — Medication 1: at 17:08

## 2023-02-02 RX ADMIN — SODIUM ZIRCONIUM CYCLOSILICATE 10 GRAM(S): 10 POWDER, FOR SUSPENSION ORAL at 14:36

## 2023-02-02 RX ADMIN — Medication 3 MILLILITER(S): at 15:53

## 2023-02-02 RX ADMIN — Medication 3 MILLILITER(S): at 09:51

## 2023-02-02 RX ADMIN — ACETAZOLAMIDE 250 MILLIGRAM(S): 250 TABLET ORAL at 17:55

## 2023-02-02 RX ADMIN — Medication 3: at 12:12

## 2023-02-02 RX ADMIN — CARVEDILOL PHOSPHATE 6.25 MILLIGRAM(S): 80 CAPSULE, EXTENDED RELEASE ORAL at 17:54

## 2023-02-02 RX ADMIN — Medication 1 DROP(S): at 17:54

## 2023-02-02 RX ADMIN — APIXABAN 5 MILLIGRAM(S): 2.5 TABLET, FILM COATED ORAL at 06:12

## 2023-02-02 RX ADMIN — GABAPENTIN 100 MILLIGRAM(S): 400 CAPSULE ORAL at 06:09

## 2023-02-02 RX ADMIN — Medication 1 DROP(S): at 06:11

## 2023-02-02 RX ADMIN — Medication 600 MILLIGRAM(S): at 17:55

## 2023-02-02 RX ADMIN — GABAPENTIN 100 MILLIGRAM(S): 400 CAPSULE ORAL at 17:57

## 2023-02-02 RX ADMIN — Medication 3 MILLILITER(S): at 20:11

## 2023-02-02 RX ADMIN — Medication 3 MILLILITER(S): at 03:16

## 2023-02-02 RX ADMIN — CARVEDILOL PHOSPHATE 6.25 MILLIGRAM(S): 80 CAPSULE, EXTENDED RELEASE ORAL at 06:12

## 2023-02-02 NOTE — PROGRESS NOTE ADULT - ASSESSMENT
89 year old Female, from home, ambulates with RW, with PMHx HTN, COPD, Afib, HFpEF (7/29/22 EF 55-60%, LVH, GIDD, mild pulmonary HTN), Breast CA, and remote h/o LLE DVT presents with cough and shortness of breath for a weeks duration,acute diastolic HF.  1.Acute diastolic HF-IV lasix.  2.Afib-eliquis,coreg.  3.COPD-Pulm f/u.  4.HTN-coreg.  5.PPI.  6.Sleep study-r/o giana.  7.Hyperkalemia-lokelma.

## 2023-02-02 NOTE — PROGRESS NOTE ADULT - SUBJECTIVE AND OBJECTIVE BOX
Patient was seen and examined  Patient is a 89y old  Female who presents with a chief complaint of AHRF (01 Feb 2023 13:02)      INTERVAL HPI/OVERNIGHT EVENTS:  T(C): 36.6 (02-02-23 @ 05:10), Max: 36.6 (02-01-23 @ 20:50)  HR: 75 (02-02-23 @ 05:10) (75 - 103)  BP: 129/62 (02-02-23 @ 05:10) (104/77 - 129/62)  RR: 18 (02-02-23 @ 05:10) (18 - 20)  SpO2: 99% (02-02-23 @ 05:10) (87% - 99%)  Wt(kg): --  I&O's Summary    01 Feb 2023 07:01  -  02 Feb 2023 07:00  --------------------------------------------------------  IN: 0 mL / OUT: 1650 mL / NET: -1650 mL        LABS:                        12.5   8.23  )-----------( 229      ( 02 Feb 2023 06:45 )             40.8     02-02    136  |  94<L>  |  22<H>  ----------------------------<  143<H>  7.3<HH>   |  37<H>  |  0.94    Ca    8.5      02 Feb 2023 06:45  Phos  2.6     02-02  Mg     2.6     02-02    TPro  6.9  /  Alb  3.2<L>  /  TBili  0.5  /  DBili  x   /  AST  23  /  ALT  25  /  AlkPhos  102  02-01        CAPILLARY BLOOD GLUCOSE      POCT Blood Glucose.: 134 mg/dL (02 Feb 2023 07:57)  POCT Blood Glucose.: 166 mg/dL (01 Feb 2023 21:04)  POCT Blood Glucose.: 146 mg/dL (01 Feb 2023 16:27)  POCT Blood Glucose.: 262 mg/dL (01 Feb 2023 11:11)              MEDICATIONS  (STANDING):  albuterol/ipratropium for Nebulization 3 milliLiter(s) Nebulizer every 6 hours  anastrozole 1 milliGRAM(s) Oral daily  apixaban 5 milliGRAM(s) Oral two times a day  artificial  tears Solution 1 Drop(s) Both EYES two times a day  carvedilol 6.25 milliGRAM(s) Oral every 12 hours  dextrose 5%. 1000 milliLiter(s) (50 mL/Hr) IV Continuous <Continuous>  dextrose 5%. 1000 milliLiter(s) (100 mL/Hr) IV Continuous <Continuous>  dextrose 50% Injectable 25 Gram(s) IV Push once  dextrose 50% Injectable 12.5 Gram(s) IV Push once  dextrose 50% Injectable 25 Gram(s) IV Push once  furosemide   Injectable 40 milliGRAM(s) IV Push daily  gabapentin 100 milliGRAM(s) Oral two times a day  glucagon  Injectable 1 milliGRAM(s) IntraMuscular once  insulin lispro (ADMELOG) corrective regimen sliding scale   SubCutaneous three times a day before meals  insulin lispro (ADMELOG) corrective regimen sliding scale   SubCutaneous at bedtime  montelukast 10 milliGRAM(s) Oral daily  predniSONE   Tablet 40 milliGRAM(s) Oral daily  simvastatin 20 milliGRAM(s) Oral at bedtime    MEDICATIONS  (PRN):  acetaminophen     Tablet .. 650 milliGRAM(s) Oral every 6 hours PRN Temp greater or equal to 38C (100.4F), Mild Pain (1 - 3)  dextrose Oral Gel 15 Gram(s) Oral once PRN Blood Glucose LESS THAN 70 milliGRAM(s)/deciliter  melatonin 3 milliGRAM(s) Oral at bedtime PRN Insomnia  polyethylene glycol 3350 17 Gram(s) Oral daily PRN Constipation  senna 2 Tablet(s) Oral at bedtime PRN Constipation      RADIOLOGY & ADDITIONAL TESTS:    Imaging Personally Reviewed:  [ ] YES  [ ] NO    REVIEW OF SYSTEMS:  CONSTITUTIONAL: No fever, weight loss, or fatigue  EYES: No eye pain, visual disturbances, or discharge  ENMT:  No difficulty hearing, tinnitus, vertigo; No sinus or throat pain  NECK: No pain or stiffness  BREASTS: No pain, masses, or nipple discharge  RESPIRATORY: No cough, wheezing, chills or hemoptysis; No shortness of breath  CARDIOVASCULAR: No chest pain, palpitations, dizziness, or leg swelling  GASTROINTESTINAL: No abdominal or epigastric pain. No nausea, vomiting, or hematemesis; No diarrhea or constipation. No melena or hematochezia.  GENITOURINARY: No dysuria, frequency, hematuria, or incontinence  NEUROLOGICAL: No headaches, memory loss, loss of strength, numbness, or tremors  SKIN: No itching, burning, rashes, or lesions   LYMPH NODES: No enlarged glands  ENDOCRINE: No heat or cold intolerance; No hair loss  MUSCULOSKELETAL: No joint pain or swelling; No muscle, back, or extremity pain  PSYCHIATRIC: No depression, anxiety, mood swings, or difficulty sleeping  HEME/LYMPH: No easy bruising, or bleeding gums  ALLERY AND IMMUNOLOGIC: No hives or eczema      Consultant(s) Notes Reviewed:  [ x ] YES  [ ] NO    PHYSICAL EXAM:  GENERAL: NAD, well-groomed, well-developed  HEAD:  Atraumatic, Normocephalic  EYES: EOMI, PERRLA, conjunctiva and sclera clear  ENMT: No tonsillar erythema, exudates, or enlargement; Moist mucous membranes, Good dentition, No lesions  NECK: Supple, No JVD, Normal thyroid  NERVOUS SYSTEM:  Alert & Oriented X3, Good concentration; Motor Strength 5/5 B/L upper and lower extremities; DTRs 2+ intact and symmetric  CHEST/LUNG: Clear to percussion bilaterally; No rales, rhonchi, wheezing, or rubs  HEART: Regular rate and rhythm; No murmurs, rubs, or gallops  ABDOMEN: Soft, Nontender, Nondistended; Bowel sounds present  EXTREMITIES:  decrease edema  LYMPH: No lymphadenopathy noted  SKIN: No rashes or lesions    Care Discussed with Consultants/Other Providers [ x] YES  [ ] NO

## 2023-02-02 NOTE — DISCHARGE NOTE NURSING/CASE MANAGEMENT/SOCIAL WORK - NSDCVIVACCINE_GEN_ALL_CORE_FT
influenza, injectable, quadrivalent, preservative free; 27-Oct-2017 14:11; Brandy Joaquin (RN); [employer]; [id number]; IntraMuscular; Deltoid Left.; 0.5 milliLiter(s); VIS (VIS Published: 07-Aug-2015, VIS Presented: 27-Oct-2017);   influenza, high-dose, quadrivalent; 18-Nov-2021 06:24; Yemi Santana (WINTER); [employer]; [id number] (Exp. Date: 30-Jun-2022); IntraMuscular; Deltoid Left.; 0.7 milliLiter(s); VIS (VIS Published: 06-Aug-2021, VIS Presented: 18-Nov-2021);

## 2023-02-02 NOTE — PROGRESS NOTE ADULT - ASSESSMENT
Patient is 89 year old Female, from home, ambulates with RW, with PMHx HTN, COPD, Afib, HFpEF (7/29/22 EF 55-60%, LVH, GIDD, mild pulmonary HTN), Breast CA, and remote h/o LLE DVT presents with cough and shortness of breath for a weeks duration. Pt was taken to PCP Dr. Bianchi's office who sent her to the ED for admission. Patient admitted to medicine for acute hypoxic respiratory failure 2/2 COPD exacerbation v. CHF exacerbation. CXR: b/l interstitial infiltrates, patient treated with Lasix and IV steroids. EKG: rate controlled afib, cardiology Dr. Siu following.   Patient found to have elevated K+ 5.6 and was given insulin 5U and D50. BNP 3916.  Pulmonary Dr. Son following started on bronchodilators steroids and Spiriva, recommended pft as outpatient.    Discussed with granddaughter, does not want pt to go to [facility], will arrange for home PT and home oxygen. Pt was 78% oxygen saturation on room air. Pending further improvement on IV lasix and prednisone.

## 2023-02-02 NOTE — PROGRESS NOTE ADULT - ASSESSMENT
Patient is a 89y old  Female from home, ambulates with RW, with PMHx HTN, COPD, Afib, HFpEF (7/29/22 EF 55-60%, LVH, GIDD, mild pulmonary HTN), Breast CA, and remote h/o LLE DVT, now presents to the ER for evaluation of cough and shortness of breath. She has had dry cough for over a week which became productive and associated with wheezing over the last few days. Pts O2 saturation has been fluctuating in the 80s over the past week and this morning was in the high 70s, per granddaughters.  Pt was taken to PCP Dr. Bianchi's office who sent her to the ER for admission.. On admission, she has no fever, no Leukocytosis, but found to have positive Urine analysis and negative CXR. She has started on Ceftriaxone and The ID consult requested to assist with further evaluation and antibiotic management.    # Metabolic encephalopathy - resolving   # UTI  - Urine Cx grew Klebsiella  # Hypoxia- on supplemental oxygenation    would recommend:    1. Follow up final Urine cultures for sensitivities of Klebsiella  2. Continue Ceftriaxone until work up is done  3. Aspiration precaution   4. Supplemental oxygenation and bronchodilator as needed    d/w Family at the bed side and Covering NPSamira       Attending Attestation:    Spent more than 45 minutes on total encounter, more than 50 % of the visit was spent counseling and/or coordinating care by the Attending physician.  '  Complexity:    # Metabolic encephalopathy   # UTI  # Hypoxia- on supplemental oxygenation Patient is a 89y old  Female from home, ambulates with RW, with PMHx HTN, COPD, Afib, HFpEF (7/29/22 EF 55-60%, LVH, GIDD, mild pulmonary HTN), Breast CA, and remote h/o LLE DVT, now presents to the ER for evaluation of cough and shortness of breath. She has had dry cough for over a week which became productive and associated with wheezing over the last few days. Pts O2 saturation has been fluctuating in the 80s over the past week and this morning was in the high 70s, per granddaughters.  Pt was taken to PCP Dr. Bianchi's office who sent her to the ER for admission.. On admission, she has no fever, no Leukocytosis, but found to have positive Urine analysis and negative CXR. She has started on Ceftriaxone and The ID consult requested to assist with further evaluation and antibiotic management.    # Metabolic encephalopathy - resolving   # UTI  - Urine Cx grew Klebsiella  # Hypoxia- on supplemental oxygenation    would recommend:    1. Continue Ceftriaxone to cover Klebsiella   2. Aspiration precaution  3. Diuresis as per Primary team   4. Supplemental oxygenation and bronchodilator as needed  5. Keep Both LE elevated    d/w Family at the bed side and Covering NP, Ray    Attending Attestation:    Spent more than 45 minutes on total encounter, more than 50 % of the visit was spent counseling and/or coordinating care by the Attending physician.  '  Complexity:    # Metabolic encephalopathy   # UTI  # Hypoxia- on supplemental oxygenation

## 2023-02-02 NOTE — PROGRESS NOTE ADULT - PROBLEM SELECTOR PLAN 1
pt p/w cough, SOB, wheezing and hypoxia   ct chest- no consolidations  likely COPD exacerbation  noted b/l lower ext edema   room air sat 78%   c/w prednisone 40 mg daily   c/w IV lasix 40 mg IVP daily  Pulm Dr. Son  ID Dr. Denton

## 2023-02-02 NOTE — DISCHARGE NOTE NURSING/CASE MANAGEMENT/SOCIAL WORK - NSDCPEFALRISK_GEN_ALL_CORE
For information on Fall & Injury Prevention, visit: [email or web address] OR  [email or web address] OR  https://www.cdc.gov/steadi/patient.html

## 2023-02-02 NOTE — PROGRESS NOTE ADULT - PROBLEM SELECTOR PLAN 3
pt with significant edema b/l lower ext  Echo 7/22: EF wnl, GIDD  BNP 3916  c/w lasix 40mg IV qd   strict Is and Os   Cardio Nabatian following

## 2023-02-02 NOTE — PROGRESS NOTE ADULT - SUBJECTIVE AND OBJECTIVE BOX
CHIEF COMPLAINT:Patient is a 89y old  Female who presents with a chief complaint of AHRF.pt appears comfortable.    	  REVIEW OF SYSTEMS:  CONSTITUTIONAL: No fever, weight loss, or fatigue  EYES: No eye pain, visual disturbances, or discharge  ENT:  No difficulty hearing, tinnitus, vertigo; No sinus or throat pain  NECK: No pain or stiffness  RESPIRATORY: No cough, wheezing, chills or hemoptysis; No Shortness of Breath  CARDIOVASCULAR: No chest pain, palpitations, passing out, dizziness, or leg swelling  GASTROINTESTINAL: No abdominal or epigastric pain. No nausea, vomiting, or hematemesis; No diarrhea or constipation. No melena or hematochezia.  GENITOURINARY: No dysuria, frequency, hematuria, or incontinence  NEUROLOGICAL: No headaches, memory loss, loss of strength, numbness, or tremors  SKIN: No itching, burning, rashes, or lesions   LYMPH Nodes: No enlarged glands  ENDOCRINE: No heat or cold intolerance; No hair loss  MUSCULOSKELETAL: No joint pain or swelling; No muscle, back, or extremity pain  PSYCHIATRIC: No depression, anxiety, mood swings, or difficulty sleeping  HEME/LYMPH: No easy bruising, or bleeding gums  ALLERGY AND IMMUNOLOGIC: No hives or eczema	        PHYSICAL EXAM:  T(C): 36.6 (02-02-23 @ 12:32), Max: 36.6 (02-01-23 @ 20:50)  HR: 84 (02-02-23 @ 12:32) (75 - 103)  BP: 134/75 (02-02-23 @ 12:32) (104/77 - 134/75)  RR: 18 (02-02-23 @ 12:32) (18 - 20)  SpO2: 95% (02-02-23 @ 12:32) (95% - 99%)  Wt(kg): --  I&O's Summary    01 Feb 2023 07:01  -  02 Feb 2023 07:00  --------------------------------------------------------  IN: 0 mL / OUT: 1650 mL / NET: -1650 mL        Appearance: Normal	  HEENT:   Normal oral mucosa, PERRL, EOMI	  Lymphatic: No lymphadenopathy  Cardiovascular: Normal S1 S2, No JVD, No murmurs, +1 edema  Respiratory: Lungs clear to auscultation	  Psychiatry: A & O x 3, Mood & affect appropriate  Gastrointestinal:  Soft, Non-tender, + BS	  Skin: No rashes, No ecchymoses, No cyanosis	  Neurologic: Non-focal  Extremities: Normal range of motion, No clubbing, cyanosis +1 edema  Vascular: Peripheral pulses palpable 2+ bilaterally    MEDICATIONS  (STANDING):  acetaZOLAMIDE    Tablet 250 milliGRAM(s) Oral two times a day  albuterol/ipratropium for Nebulization 3 milliLiter(s) Nebulizer every 6 hours  anastrozole 1 milliGRAM(s) Oral daily  apixaban 5 milliGRAM(s) Oral two times a day  artificial  tears Solution 1 Drop(s) Both EYES two times a day  carvedilol 6.25 milliGRAM(s) Oral every 12 hours  dextrose 5%. 1000 milliLiter(s) (50 mL/Hr) IV Continuous <Continuous>  dextrose 5%. 1000 milliLiter(s) (100 mL/Hr) IV Continuous <Continuous>  dextrose 50% Injectable 25 Gram(s) IV Push once  dextrose 50% Injectable 12.5 Gram(s) IV Push once  dextrose 50% Injectable 25 Gram(s) IV Push once  gabapentin 100 milliGRAM(s) Oral two times a day  glucagon  Injectable 1 milliGRAM(s) IntraMuscular once  guaiFENesin  milliGRAM(s) Oral every 12 hours  insulin lispro (ADMELOG) corrective regimen sliding scale   SubCutaneous three times a day before meals  insulin lispro (ADMELOG) corrective regimen sliding scale   SubCutaneous at bedtime  montelukast 10 milliGRAM(s) Oral daily  predniSONE   Tablet 40 milliGRAM(s) Oral daily  simvastatin 20 milliGRAM(s) Oral at bedtime  sodium zirconium cyclosilicate 10 Gram(s) Oral once  tiotropium 2.5 MICROgram(s) Inhaler 2 Puff(s) Inhalation daily       	  	  LABS:	 	        Troponin I, High Sensitivity Result: 110.9 ng/L (01-30 @ 20:20)                            12.5   8.23  )-----------( 229      ( 02 Feb 2023 06:45 )             40.8     02-02    134<L>  |  90<L>  |  21<H>  ----------------------------<  155<H>  5.4<H>   |  45<HH>  |  0.98    Ca    9.2      02 Feb 2023 08:35  Phos  2.6     02-02  Mg     2.6     02-02    TPro  6.9  /  Alb  3.2<L>  /  TBili  0.5  /  DBili  x   /  AST  23  /  ALT  25  /  AlkPhos  102  02-01    proBNP: Serum Pro-Brain Natriuretic Peptide: 3916 pg/mL (01-30 @ 20:20)  Serum Pro-Brain Natriuretic Peptide: 1943 pg/mL (01-11 @ 16:58)

## 2023-02-02 NOTE — PROGRESS NOTE ADULT - SUBJECTIVE AND OBJECTIVE BOX
Patient is seen and examined at the bed side, is afebrile. She is doing better, is responsive to verbal stimuli. The Blood cultures  have no growth to date and Urine culture grew Klebsiella.       REVIEW OF SYSTEMS: All other review systems are negative        ALLERGIES: Chicken (Stomach Upset)  losartan (Angioedema)      Vital Signs Last 24 Hrs  T(C): 36.6 (2023 12:32), Max: 36.6 (2023 20:50)  T(F): 97.9 (2023 12:32), Max: 97.9 (2023 12:32)  HR: 84 (2023 12:32) (75 - 96)  BP: 134/75 (2023 12:32) (108/63 - 134/75)  BP(mean): --  RR: 18 (2023 12:32) (18 - 18)  SpO2: 95% (2023 12:32) (95% - 99%)    Parameters below as of 2023 12:32  Patient On (Oxygen Delivery Method): room air        PHYSICAL EXAM:  GENERAL: Not in distress, on oxygen via NC  CHEST/LUNG:  Not using accessory muscles   HEART: s1 and s2 present  ABDOMEN:  Nontender and  Nondistended  EXTREMITIES: No pedal  edema  CNS: Lethargic       LABS:                          12.5   8.23  )-----------( 229      ( 2023 06:45 )             40.8                           12.8   6.93  )-----------( 241      ( 2023 07:50 )             43.3             134<L>  |  90<L>  |  21<H>  ----------------------------<  155<H>  5.4<H>   |  45<HH>  |  0.98    Ca    9.2      2023 08:35  Phos  2.6     02-  Mg     2.6     -    TPro  6.9  /  Alb  3.2<L>  /  TBili  0.5  /  DBili  x   /  AST  23  /  ALT  25  /  AlkPhos  102  -    136  |  94<L>  |  20<H>  ----------------------------<  145<H>  5.2   |  44<H>  |  1.05    Ca    8.9      2023 07:50  Phos  3.2       Mg     2.4         TPro  6.9  /  Alb  3.2<L>  /  TBili  0.5  /  DBili  x   /  AST  23  /  ALT  25  /  AlkPhos  102      PT/INR - ( 2023 18:00 )   PT: 23.8 sec;   INR: 1.99 ratio      PTT - ( 2023 18:00 )  PTT:34.5 sec      CAPILLARY BLOOD GLUCOSE  POCT Blood Glucose.: 250 mg/dL (2023 01:11)  POCT Blood Glucose.: 176 mg/dL (2023 23:47)  POCT Blood Glucose.: 176 mg/dL (2023 16:31)        Urinalysis Basic - ( 2023 21:30 )  Color: Yellow / Appearance: very cloudy / S.010 / pH: x  Gluc: x / Ketone: Negative  / Bili: Negative / Urobili: Negative   Blood: x / Protein: 30 mg/dL / Nitrite: Negative   Leuk Esterase: Moderate / RBC: 25-50 /HPF / WBC >50 /HPF   Sq Epi: x / Non Sq Epi: Few /HPF / Bacteria: Moderate /HPF        MEDICATIONS  (STANDING):    acetaZOLAMIDE    Tablet 250 milliGRAM(s) Oral two times a day  albuterol/ipratropium for Nebulization 3 milliLiter(s) Nebulizer every 6 hours  anastrozole 1 milliGRAM(s) Oral daily  apixaban 5 milliGRAM(s) Oral two times a day  artificial  tears Solution 1 Drop(s) Both EYES two times a day  carvedilol 6.25 milliGRAM(s) Oral every 12 hours  gabapentin 100 milliGRAM(s) Oral two times a day  glucagon  Injectable 1 milliGRAM(s) IntraMuscular once  guaiFENesin  milliGRAM(s) Oral every 12 hours  insulin lispro (ADMELOG) corrective regimen sliding scale   SubCutaneous three times a day before meals  insulin lispro (ADMELOG) corrective regimen sliding scale   SubCutaneous at bedtime  montelukast 10 milliGRAM(s) Oral daily  predniSONE   Tablet 40 milliGRAM(s) Oral daily  simvastatin 20 milliGRAM(s) Oral at bedtime  sodium zirconium cyclosilicate 10 Gram(s) Oral once          RADIOLOGY & ADDITIONAL TESTS:    23: Xray Chest 1 View- PORTABLE-Urgent (23 @ 22:00) There are increased interstitial markings which may represent the   patient's underlying COPD or pulmonary venous congestion. No focal  consolidation or gross effusion.        MICROBIOLOGY DATA:    Culture - Urine (23 @ 21:30)   Specimen Source: Clean Catch Clean Catch (Midstream)   Culture Results:   >100,000 CFU/ml Klebsiella aerogenes (Previously Enterobacter)     Culture - Blood (23 @ 18:00)   Specimen Source: .Blood Blood-Peripheral   Culture Results: No growth to date.     Culture - Blood (23 @ 17:50)   Specimen Source: .Blood Blood-Peripheral   Culture Results: No growth to date.     Flu With COVID-19 By PATRICK (23 @ 18:00)   SARS-CoV-2 Result: NotDetec             Patient is seen and examined at the bed side, is afebrile. She is doing much better. The Blood cultures remains negative to date and the sensitivities of  Klebsiella has been reviewed.       REVIEW OF SYSTEMS: All other review systems are negative      ALLERGIES: Chicken (Stomach Upset)  losartan (Angioedema)      Vital Signs Last 24 Hrs  T(C): 36.6 (2023 12:32), Max: 36.6 (2023 20:50)  T(F): 97.9 (2023 12:32), Max: 97.9 (2023 12:32)  HR: 84 (2023 12:32) (75 - 96)  BP: 134/75 (2023 12:32) (108/63 - 134/75)  BP(mean): --  RR: 18 (2023 12:32) (18 - 18)  SpO2: 95% (2023 12:32) (95% - 99%)    Parameters below as of 2023 12:32  Patient On (Oxygen Delivery Method): room air        PHYSICAL EXAM:  GENERAL: Not in distress, on oxygen via NC  CHEST/LUNG:  Not using accessory muscles   HEART: s1 and s2 present  ABDOMEN:  Nontender and  Nondistended  EXTREMITIES: No pedal  edema  CNS: Lethargic       LABS:                          12.5   8.23  )-----------( 229      ( 2023 06:45 )             40.8                           12.8   6.93  )-----------( 241      ( 2023 07:50 )             43.3         -    134<L>  |  90<L>  |  21<H>  ----------------------------<  155<H>  5.4<H>   |  45<HH>  |  0.98    Ca    9.2      2023 08:35  Phos  2.6     02-  Mg     2.6     -    TPro  6.9  /  Alb  3.2<L>  /  TBili  0.5  /  DBili  x   /  AST  23  /  ALT  25  /  AlkPhos  102  02-    02-    136  |  94<L>  |  20<H>  ----------------------------<  145<H>  5.2   |  44<H>  |  1.05    Ca    8.9      2023 07:50  Phos  3.2       Mg     2.4         TPro  6.9  /  Alb  3.2<L>  /  TBili  0.5  /  DBili  x   /  AST  23  /  ALT  25  /  AlkPhos  102      PT/INR - ( 2023 18:00 )   PT: 23.8 sec;   INR: 1.99 ratio      PTT - ( 2023 18:00 )  PTT:34.5 sec      CAPILLARY BLOOD GLUCOSE  POCT Blood Glucose.: 250 mg/dL (2023 01:11)  POCT Blood Glucose.: 176 mg/dL (2023 23:47)  POCT Blood Glucose.: 176 mg/dL (2023 16:31)        Urinalysis Basic - ( 2023 21:30 )  Color: Yellow / Appearance: very cloudy / S.010 / pH: x  Gluc: x / Ketone: Negative  / Bili: Negative / Urobili: Negative   Blood: x / Protein: 30 mg/dL / Nitrite: Negative   Leuk Esterase: Moderate / RBC: 25-50 /HPF / WBC >50 /HPF   Sq Epi: x / Non Sq Epi: Few /HPF / Bacteria: Moderate /HPF        MEDICATIONS  (STANDING):    acetaZOLAMIDE    Tablet 250 milliGRAM(s) Oral two times a day  albuterol/ipratropium for Nebulization 3 milliLiter(s) Nebulizer every 6 hours  anastrozole 1 milliGRAM(s) Oral daily  apixaban 5 milliGRAM(s) Oral two times a day  artificial  tears Solution 1 Drop(s) Both EYES two times a day  carvedilol 6.25 milliGRAM(s) Oral every 12 hours  gabapentin 100 milliGRAM(s) Oral two times a day  glucagon  Injectable 1 milliGRAM(s) IntraMuscular once  guaiFENesin  milliGRAM(s) Oral every 12 hours  insulin lispro (ADMELOG) corrective regimen sliding scale   SubCutaneous three times a day before meals  insulin lispro (ADMELOG) corrective regimen sliding scale   SubCutaneous at bedtime  montelukast 10 milliGRAM(s) Oral daily  predniSONE   Tablet 40 milliGRAM(s) Oral daily  simvastatin 20 milliGRAM(s) Oral at bedtime  sodium zirconium cyclosilicate 10 Gram(s) Oral once          RADIOLOGY & ADDITIONAL TESTS:    23: Xray Chest 1 View- PORTABLE-Urgent (23 @ 22:00) There are increased interstitial markings which may represent the   patient's underlying COPD or pulmonary venous congestion. No focal  consolidation or gross effusion.        MICROBIOLOGY DATA:    Culture - Urine (23 @ 21:30)   Specimen Source: Clean Catch Clean Catch (Midstream)   Culture Results:   >100,000 CFU/ml Klebsiella aerogenes (Previously Enterobacter)     Culture - Blood (23 @ 18:00)   Specimen Source: .Blood Blood-Peripheral   Culture Results: No growth to date.     Culture - Blood (23 @ 17:50)   Specimen Source: .Blood Blood-Peripheral   Culture Results: No growth to date.     Flu With COVID-19 By PATRICK (23 @ 18:00)   SARS-CoV-2 Result: NotDetec

## 2023-02-02 NOTE — PROGRESS NOTE ADULT - PROBLEM SELECTOR PLAN 9
pending further diuresis of b/l le edema  c/w prednisone taper  awaiting home oxygen delivery  likely dc monday  family refusing AMY

## 2023-02-02 NOTE — DISCHARGE NOTE NURSING/CASE MANAGEMENT/SOCIAL WORK - PATIENT PORTAL LINK FT
You can access the FollowMyHealth Patient Portal offered by [facility] by registering at the following website: [email or web address]. By joining [employer]’s FollowMyHealth portal, you will also be able to view your health information using other applications (apps) compatible with our system.

## 2023-02-02 NOTE — PROGRESS NOTE ADULT - SUBJECTIVE AND OBJECTIVE BOX
Patient is a 89y old  Female who presents with a chief complaint of AHRF (02 Feb 2023 12:53)  Awake, alert, comfortable in bed in NAD    INTERVAL HPI/OVERNIGHT EVENTS:      VITAL SIGNS:  T(F): 97.9 (02-02-23 @ 12:32)  HR: 84 (02-02-23 @ 12:32)  BP: 134/75 (02-02-23 @ 12:32)  RR: 18 (02-02-23 @ 12:32)  SpO2: 95% (02-02-23 @ 12:32)  Wt(kg): --  I&O's Detail    01 Feb 2023 07:01  -  02 Feb 2023 07:00  --------------------------------------------------------  IN:  Total IN: 0 mL    OUT:    Indwelling Catheter - Urethral (mL): 1150 mL    Voided (mL): 500 mL  Total OUT: 1650 mL    Total NET: -1650 mL              REVIEW OF SYSTEMS:    CONSTITUTIONAL:  No fevers, chills, sweats    HEENT:  Eyes:  No diplopia or blurred vision. ENT:  No earache, sore throat or runny nose.    CARDIOVASCULAR:  No pressure, squeezing, tightness, or heaviness about the chest; no palpitations.    RESPIRATORY:  Per HPI    GASTROINTESTINAL:  No abdominal pain, nausea, vomiting or diarrhea.    GENITOURINARY:  No dysuria, frequency or urgency.    NEUROLOGIC:  No paresthesias, fasciculations, seizures or weakness.    PSYCHIATRIC:  No disorder of thought or mood.      PHYSICAL EXAM:    Constitutional: Well developed and nourished  Eyes:Perrla  ENMT: normal  Neck:supple  Respiratory: good air entry  Cardiovascular: S1 S2 regular  Gastrointestinal: Soft, Non tender  Extremities: + edema  Vascular:normal  Neurological:Awake, alert,Ox3  Musculoskeletal:Normal      MEDICATIONS  (STANDING):  acetaZOLAMIDE    Tablet 250 milliGRAM(s) Oral two times a day  albuterol/ipratropium for Nebulization 3 milliLiter(s) Nebulizer every 6 hours  anastrozole 1 milliGRAM(s) Oral daily  apixaban 5 milliGRAM(s) Oral two times a day  artificial  tears Solution 1 Drop(s) Both EYES two times a day  carvedilol 6.25 milliGRAM(s) Oral every 12 hours  dextrose 5%. 1000 milliLiter(s) (50 mL/Hr) IV Continuous <Continuous>  dextrose 5%. 1000 milliLiter(s) (100 mL/Hr) IV Continuous <Continuous>  dextrose 50% Injectable 25 Gram(s) IV Push once  dextrose 50% Injectable 12.5 Gram(s) IV Push once  dextrose 50% Injectable 25 Gram(s) IV Push once  gabapentin 100 milliGRAM(s) Oral two times a day  glucagon  Injectable 1 milliGRAM(s) IntraMuscular once  guaiFENesin  milliGRAM(s) Oral every 12 hours  insulin lispro (ADMELOG) corrective regimen sliding scale   SubCutaneous three times a day before meals  insulin lispro (ADMELOG) corrective regimen sliding scale   SubCutaneous at bedtime  montelukast 10 milliGRAM(s) Oral daily  predniSONE   Tablet 40 milliGRAM(s) Oral daily  simvastatin 20 milliGRAM(s) Oral at bedtime  sodium zirconium cyclosilicate 10 Gram(s) Oral once    MEDICATIONS  (PRN):  acetaminophen     Tablet .. 650 milliGRAM(s) Oral every 6 hours PRN Temp greater or equal to 38C (100.4F), Mild Pain (1 - 3)  dextrose Oral Gel 15 Gram(s) Oral once PRN Blood Glucose LESS THAN 70 milliGRAM(s)/deciliter  melatonin 3 milliGRAM(s) Oral at bedtime PRN Insomnia  polyethylene glycol 3350 17 Gram(s) Oral daily PRN Constipation  senna 2 Tablet(s) Oral at bedtime PRN Constipation      Allergies    losartan (Angioedema)    Intolerances    Chicken (Stomach Upset)      LABS:                        12.5   8.23  )-----------( 229      ( 02 Feb 2023 06:45 )             40.8     02-02    134<L>  |  90<L>  |  21<H>  ----------------------------<  155<H>  5.4<H>   |  45<HH>  |  0.98    Ca    9.2      02 Feb 2023 08:35  Phos  2.6     02-02  Mg     2.6     02-02    TPro  6.9  /  Alb  3.2<L>  /  TBili  0.5  /  DBili  x   /  AST  23  /  ALT  25  /  AlkPhos  102  02-01              CAPILLARY BLOOD GLUCOSE      POCT Blood Glucose.: 266 mg/dL (02 Feb 2023 11:22)  POCT Blood Glucose.: 134 mg/dL (02 Feb 2023 07:57)  POCT Blood Glucose.: 166 mg/dL (01 Feb 2023 21:04)  POCT Blood Glucose.: 146 mg/dL (01 Feb 2023 16:27)    pro-bnp 3916 01-30 @ 20:20     d-dimer --  01-30 @ 20:20      RADIOLOGY & ADDITIONAL TESTS:    CXR:  < from: Xray Chest 1 View- PORTABLE-Urgent (01.30.23 @ 22:00) >  Impression:    There are increased interstitial markings which may represent the   patient's underlying COPD or pulmonary venous congestion. No focal   consolidation or gross effusion.      < end of copied text >    Ct scan chest:    ekg;    echo:

## 2023-02-02 NOTE — PROGRESS NOTE ADULT - PROBLEM SELECTOR PLAN 2
add mucinex 600 mg er bid for mucolytic effect  plan as above add mucinex 600 mg er bid for mucolytic effect  c/w supplemental oxygen to maintain sat >88%  c/w duoneb q6h  c/w spiriva 2 puffs daily  plan as above

## 2023-02-02 NOTE — PROGRESS NOTE ADULT - SUBJECTIVE AND OBJECTIVE BOX
Patient is a 89y old  Female who presents with a chief complaint of AHRF (02 Feb 2023 08:06)    OVERNIGHT EVENTS: no acute changes    REVIEW OF SYSTEMS:  CONSTITUTIONAL: No fever, chills  ENMT:  No difficulty hearing, no change in vision  NECK: No pain or stiffness  RESPIRATORY: No cough, SOB  CARDIOVASCULAR: No chest pain, palpitations  GASTROINTESTINAL: No abdominal pain. No nausea, vomiting, or diarrhea  GENITOURINARY: No dysuria  NEUROLOGICAL: No HA  SKIN: No itching, burning, rashes, or lesions   LYMPH NODES: No enlarged glands  ENDOCRINE: No heat or cold intolerance; No hair loss  MUSCULOSKELETAL: No joint pain or swelling; No muscle, back, or extremity pain  PSYCHIATRIC: No depression, anxiety  HEME/LYMPH: No easy bruising, or bleeding gums    T(C): 36.6 (02-02-23 @ 05:10), Max: 36.6 (02-01-23 @ 20:50)  HR: 75 (02-02-23 @ 05:10) (75 - 103)  BP: 129/62 (02-02-23 @ 05:10) (104/77 - 129/62)  RR: 18 (02-02-23 @ 05:10) (18 - 20)  SpO2: 99% (02-02-23 @ 05:10) (95% - 99%)  Wt(kg): --Vital Signs Last 24 Hrs  T(C): 36.6 (02 Feb 2023 05:10), Max: 36.6 (01 Feb 2023 20:50)  T(F): 97.8 (02 Feb 2023 05:10), Max: 97.8 (01 Feb 2023 20:50)  HR: 75 (02 Feb 2023 05:10) (75 - 103)  BP: 129/62 (02 Feb 2023 05:10) (104/77 - 129/62)  BP(mean): --  RR: 18 (02 Feb 2023 05:10) (18 - 20)  SpO2: 99% (02 Feb 2023 05:10) (95% - 99%)    Parameters below as of 02 Feb 2023 05:10  Patient On (Oxygen Delivery Method): nasal cannula  O2 Flow (L/min): 2      MEDICATIONS  (STANDING):  albuterol/ipratropium for Nebulization 3 milliLiter(s) Nebulizer every 6 hours  anastrozole 1 milliGRAM(s) Oral daily  apixaban 5 milliGRAM(s) Oral two times a day  artificial  tears Solution 1 Drop(s) Both EYES two times a day  carvedilol 6.25 milliGRAM(s) Oral every 12 hours  dextrose 5%. 1000 milliLiter(s) (50 mL/Hr) IV Continuous <Continuous>  dextrose 5%. 1000 milliLiter(s) (100 mL/Hr) IV Continuous <Continuous>  dextrose 50% Injectable 25 Gram(s) IV Push once  dextrose 50% Injectable 12.5 Gram(s) IV Push once  dextrose 50% Injectable 25 Gram(s) IV Push once  furosemide   Injectable 40 milliGRAM(s) IV Push daily  gabapentin 100 milliGRAM(s) Oral two times a day  glucagon  Injectable 1 milliGRAM(s) IntraMuscular once  insulin lispro (ADMELOG) corrective regimen sliding scale   SubCutaneous three times a day before meals  insulin lispro (ADMELOG) corrective regimen sliding scale   SubCutaneous at bedtime  montelukast 10 milliGRAM(s) Oral daily  predniSONE   Tablet 40 milliGRAM(s) Oral daily  simvastatin 20 milliGRAM(s) Oral at bedtime  sodium zirconium cyclosilicate 10 Gram(s) Oral daily    MEDICATIONS  (PRN):  acetaminophen     Tablet .. 650 milliGRAM(s) Oral every 6 hours PRN Temp greater or equal to 38C (100.4F), Mild Pain (1 - 3)  dextrose Oral Gel 15 Gram(s) Oral once PRN Blood Glucose LESS THAN 70 milliGRAM(s)/deciliter  melatonin 3 milliGRAM(s) Oral at bedtime PRN Insomnia  polyethylene glycol 3350 17 Gram(s) Oral daily PRN Constipation  senna 2 Tablet(s) Oral at bedtime PRN Constipation      PHYSICAL EXAM:  GENERAL: +overweight, well-appearing  EYES: +legally blind. clear conjunctiva.   ENMT: Moist mucous membranes  NECK: Supple, No JVD, Normal thyroid  CHEST/LUNG: +nasal cannula. +diminished b/l.   HEART: S1, S2, Regular rate and rhythm  ABDOMEN: Soft, Nontender, Nondistended; Bowel sounds present  NEURO: Alert & Oriented X2 (name, place, not time)  EXTREMITIES: +b/l +2 LE edema, no calf tenderness  LYMPH: No lymphadenopathy noted  SKIN: No rashes or lesions    Consultant(s) Notes Reviewed:  [x ] YES  [ ] NO  Care Discussed with Consultants/Other Providers [ x] YES  [ ] NO    LABS:                        12.5   8.23  )-----------( 229      ( 02 Feb 2023 06:45 )             40.8     02-02    134<L>  |  90<L>  |  21<H>  ----------------------------<  155<H>  5.4<H>   |  45<HH>  |  0.98    Ca    9.2      02 Feb 2023 08:35  Phos  2.6     02-02  Mg     2.6     02-02    TPro  6.9  /  Alb  3.2<L>  /  TBili  0.5  /  DBili  x   /  AST  23  /  ALT  25  /  AlkPhos  102  02-01      CAPILLARY BLOOD GLUCOSE      POCT Blood Glucose.: 134 mg/dL (02 Feb 2023 07:57)  POCT Blood Glucose.: 166 mg/dL (01 Feb 2023 21:04)  POCT Blood Glucose.: 146 mg/dL (01 Feb 2023 16:27)            RADIOLOGY & ADDITIONAL TESTS:  < from: CT Chest No Cont (01.31.23 @ 16:36) >    ACC: [id number] EXAM:  CT CHEST   ORDERED BY: FIONA PRETTY     PROCEDURE DATE:  01/31/2023          INTERPRETATION:  HISTORY: Admitting Dxs: J44.1 CHRONIC OBSTRUCTIVE   PULMONARY DISEASE W (ACUTE) EXACERBATION    EXAMINATION: CT CHEST was performed without IV contrast.    COMPARISON: 7/28/2022.    FINDINGS:    Image quality degraded due to extensive respiratory motion.    AIRWAYS, LUNGS, PLEURA: Trachea and mainstem bronchi patent. Biapical   scarring unchanged. Bibasilar atelectasis. No focal lung consolidation.   No pleural effusion.    MEDIASTINUM: Cardiomegaly. No pericardial effusion. Thoracic aorta normal   caliber.  No large mediastinal lymph nodes.    IMAGED ABDOMEN: Unremarkable.    SOFT TISSUES: Unremarkable.    BONES: Unremarkable.      IMPRESSION:.    No focal lung consolidation.    Bibasilar atelectasis.    --- End of Report ---             TOM COATES MD; Attending Radiologist  This document has been electronically signed. Jan 31 2023  4:45PM    < end of copied text >      Imaging Personally Reviewed:  [ ] YES  [ ] NO

## 2023-02-02 NOTE — PROGRESS NOTE ADULT - ASSESSMENT
Patient is 89 year old Female, from home, ambulates with RW, with PMHx HTN, COPD, Afib, HFpEF (7/29/22 EF 55-60%, LVH, GIDD, mild pulmonary HTN), Breast CA, and remote h/o LLE DVT presents with cough and shortness of breath for a weeks duration. Pt was taken to PCP Dr. Bianchi's office who sent her to the ED for admission. Patient admitted to medicine for hypoxia 2/2 COPD exacerbation v. CHF exacerbation. CXR: b/l interstitial infiltrates, patient treated with Lasix and IV steroids. EKG: rate controlled afib, cardiology consulted for recommendations.  Patient found to have elevated K+ 5.6 and was given insulin 5U and D50. BNP 3916.  Pulmonary consulted started on bronchodilators steroids and Spiriva, recommended pft as outpatient.    hyper kalemia   stat bmp please  TAPER STEROIDS

## 2023-02-03 LAB
ANION GAP SERPL CALC-SCNC: 4 MMOL/L — LOW (ref 5–17)
BASE EXCESS BLDA CALC-SCNC: 13.2 MMOL/L — HIGH (ref -2–3)
BLOOD GAS COMMENTS ARTERIAL: SIGNIFICANT CHANGE UP
BUN SERPL-MCNC: 19 MG/DL — HIGH (ref 7–18)
CALCIUM SERPL-MCNC: 8.9 MG/DL — SIGNIFICANT CHANGE UP (ref 8.4–10.5)
CHLORIDE SERPL-SCNC: 96 MMOL/L — SIGNIFICANT CHANGE UP (ref 96–108)
CO2 SERPL-SCNC: 40 MMOL/L — HIGH (ref 22–31)
CREAT SERPL-MCNC: 0.88 MG/DL — SIGNIFICANT CHANGE UP (ref 0.5–1.3)
EGFR: 62 ML/MIN/1.73M2 — SIGNIFICANT CHANGE UP
GAS PNL BLDA: SIGNIFICANT CHANGE UP
GLUCOSE BLDC GLUCOMTR-MCNC: 129 MG/DL — HIGH (ref 70–99)
GLUCOSE BLDC GLUCOMTR-MCNC: 180 MG/DL — HIGH (ref 70–99)
GLUCOSE BLDC GLUCOMTR-MCNC: 255 MG/DL — HIGH (ref 70–99)
GLUCOSE SERPL-MCNC: 127 MG/DL — HIGH (ref 70–99)
HCO3 BLDA-SCNC: 43 MMOL/L — HIGH (ref 21–28)
HCT VFR BLD CALC: 43.7 % — SIGNIFICANT CHANGE UP (ref 34.5–45)
HGB BLD-MCNC: 12.8 G/DL — SIGNIFICANT CHANGE UP (ref 11.5–15.5)
HOROWITZ INDEX BLDA+IHG-RTO: 28 — SIGNIFICANT CHANGE UP
MAGNESIUM SERPL-MCNC: 2.6 MG/DL — SIGNIFICANT CHANGE UP (ref 1.6–2.6)
MCHC RBC-ENTMCNC: 29.3 GM/DL — LOW (ref 32–36)
MCHC RBC-ENTMCNC: 30.7 PG — SIGNIFICANT CHANGE UP (ref 27–34)
MCV RBC AUTO: 104.8 FL — HIGH (ref 80–100)
NRBC # BLD: 0 /100 WBCS — SIGNIFICANT CHANGE UP (ref 0–0)
PCO2 BLDA: 84 MMHG — CRITICAL HIGH (ref 32–35)
PH BLDA: 7.32 — LOW (ref 7.35–7.45)
PHOSPHATE SERPL-MCNC: 2.6 MG/DL — SIGNIFICANT CHANGE UP (ref 2.5–4.5)
PLATELET # BLD AUTO: 276 K/UL — SIGNIFICANT CHANGE UP (ref 150–400)
PO2 BLDA: 91 MMHG — SIGNIFICANT CHANGE UP (ref 83–108)
POTASSIUM SERPL-MCNC: 5 MMOL/L — SIGNIFICANT CHANGE UP (ref 3.5–5.3)
POTASSIUM SERPL-SCNC: 5 MMOL/L — SIGNIFICANT CHANGE UP (ref 3.5–5.3)
RBC # BLD: 4.17 M/UL — SIGNIFICANT CHANGE UP (ref 3.8–5.2)
RBC # FLD: 13.5 % — SIGNIFICANT CHANGE UP (ref 10.3–14.5)
SAO2 % BLDA: 97 % — SIGNIFICANT CHANGE UP
SARS-COV-2 RNA SPEC QL NAA+PROBE: SIGNIFICANT CHANGE UP
SODIUM SERPL-SCNC: 140 MMOL/L — SIGNIFICANT CHANGE UP (ref 135–145)
WBC # BLD: 7.48 K/UL — SIGNIFICANT CHANGE UP (ref 3.8–10.5)
WBC # FLD AUTO: 7.48 K/UL — SIGNIFICANT CHANGE UP (ref 3.8–10.5)

## 2023-02-03 RX ORDER — INSULIN LISPRO 100/ML
VIAL (ML) SUBCUTANEOUS EVERY 6 HOURS
Refills: 0 | Status: DISCONTINUED | OUTPATIENT
Start: 2023-02-03 | End: 2023-02-05

## 2023-02-03 RX ORDER — CHLORHEXIDINE GLUCONATE 213 G/1000ML
1 SOLUTION TOPICAL
Refills: 0 | Status: DISCONTINUED | OUTPATIENT
Start: 2023-02-03 | End: 2023-02-20

## 2023-02-03 RX ORDER — PANTOPRAZOLE SODIUM 20 MG/1
40 TABLET, DELAYED RELEASE ORAL DAILY
Refills: 0 | Status: DISCONTINUED | OUTPATIENT
Start: 2023-02-03 | End: 2023-02-10

## 2023-02-03 RX ORDER — PANTOPRAZOLE SODIUM 20 MG/1
40 TABLET, DELAYED RELEASE ORAL
Refills: 0 | Status: DISCONTINUED | OUTPATIENT
Start: 2023-02-03 | End: 2023-02-03

## 2023-02-03 RX ORDER — ENOXAPARIN SODIUM 100 MG/ML
120 INJECTION SUBCUTANEOUS EVERY 12 HOURS
Refills: 0 | Status: DISCONTINUED | OUTPATIENT
Start: 2023-02-03 | End: 2023-02-04

## 2023-02-03 RX ADMIN — Medication 3 MILLILITER(S): at 08:46

## 2023-02-03 RX ADMIN — Medication 1: at 17:30

## 2023-02-03 RX ADMIN — CHLORHEXIDINE GLUCONATE 1 APPLICATION(S): 213 SOLUTION TOPICAL at 17:31

## 2023-02-03 RX ADMIN — ACETAZOLAMIDE 250 MILLIGRAM(S): 250 TABLET ORAL at 05:56

## 2023-02-03 RX ADMIN — Medication 1 DROP(S): at 05:56

## 2023-02-03 RX ADMIN — Medication 20 MILLIGRAM(S): at 05:56

## 2023-02-03 RX ADMIN — Medication 3 MILLILITER(S): at 21:02

## 2023-02-03 RX ADMIN — Medication 40 MILLIGRAM(S): at 17:30

## 2023-02-03 RX ADMIN — GABAPENTIN 100 MILLIGRAM(S): 400 CAPSULE ORAL at 05:57

## 2023-02-03 RX ADMIN — APIXABAN 5 MILLIGRAM(S): 2.5 TABLET, FILM COATED ORAL at 05:57

## 2023-02-03 RX ADMIN — ENOXAPARIN SODIUM 120 MILLIGRAM(S): 100 INJECTION SUBCUTANEOUS at 20:46

## 2023-02-03 RX ADMIN — Medication 40 MILLIGRAM(S): at 05:58

## 2023-02-03 RX ADMIN — Medication 3: at 11:50

## 2023-02-03 RX ADMIN — Medication 600 MILLIGRAM(S): at 05:56

## 2023-02-03 RX ADMIN — PANTOPRAZOLE SODIUM 40 MILLIGRAM(S): 20 TABLET, DELAYED RELEASE ORAL at 17:30

## 2023-02-03 RX ADMIN — CARVEDILOL PHOSPHATE 6.25 MILLIGRAM(S): 80 CAPSULE, EXTENDED RELEASE ORAL at 05:57

## 2023-02-03 RX ADMIN — Medication 1 DROP(S): at 18:41

## 2023-02-03 RX ADMIN — Medication 3 MILLILITER(S): at 14:27

## 2023-02-03 NOTE — PROGRESS NOTE ADULT - SUBJECTIVE AND OBJECTIVE BOX
Patient is a 89y old  Female who presents with a chief complaint of AHRF (02 Feb 2023 13:31)  Asleep, laying in bed with oxygen supp via NC in NAD    INTERVAL HPI/OVERNIGHT EVENTS:      VITAL SIGNS:  T(F): 97.8 (02-03-23 @ 05:20)  HR: 77 (02-03-23 @ 05:20)  BP: 118/56 (02-03-23 @ 05:20)  RR: 20 (02-03-23 @ 05:20)  SpO2: 100% (02-03-23 @ 05:20)  Wt(kg): --  I&O's Detail    02 Feb 2023 07:01  -  03 Feb 2023 07:00  --------------------------------------------------------  IN:  Total IN: 0 mL    OUT:    Voided (mL): 2500 mL  Total OUT: 2500 mL    Total NET: -2500 mL              REVIEW OF SYSTEMS:    CONSTITUTIONAL:  No fevers, chills, sweats    HEENT:  Eyes:  No diplopia or blurred vision. ENT:  No earache, sore throat or runny nose.    CARDIOVASCULAR:  No pressure, squeezing, tightness, or heaviness about the chest; no palpitations.    RESPIRATORY:  Per HPI    GASTROINTESTINAL:  No abdominal pain, nausea, vomiting or diarrhea.    GENITOURINARY:  No dysuria, frequency or urgency.    NEUROLOGIC:  No paresthesias, fasciculations, seizures or weakness.    PSYCHIATRIC:  No disorder of thought or mood.      PHYSICAL EXAM:    Constitutional: Well developed and nourished  Eyes:Perrla  ENMT: normal  Neck:supple  Respiratory: good air entry  Cardiovascular: S1 S2 regular  Gastrointestinal: Soft, Non tender  Extremities: No edema  Vascular:normal  Neurological:Asleep  Musculoskeletal:Normal      MEDICATIONS  (STANDING):  albuterol/ipratropium for Nebulization 3 milliLiter(s) Nebulizer every 6 hours  anastrozole 1 milliGRAM(s) Oral daily  apixaban 5 milliGRAM(s) Oral two times a day  artificial  tears Solution 1 Drop(s) Both EYES two times a day  carvedilol 6.25 milliGRAM(s) Oral every 12 hours  dextrose 5%. 1000 milliLiter(s) (50 mL/Hr) IV Continuous <Continuous>  dextrose 5%. 1000 milliLiter(s) (100 mL/Hr) IV Continuous <Continuous>  dextrose 50% Injectable 25 Gram(s) IV Push once  dextrose 50% Injectable 12.5 Gram(s) IV Push once  dextrose 50% Injectable 25 Gram(s) IV Push once  furosemide   Injectable 20 milliGRAM(s) IV Push daily  gabapentin 100 milliGRAM(s) Oral two times a day  glucagon  Injectable 1 milliGRAM(s) IntraMuscular once  guaiFENesin  milliGRAM(s) Oral every 12 hours  insulin lispro (ADMELOG) corrective regimen sliding scale   SubCutaneous three times a day before meals  insulin lispro (ADMELOG) corrective regimen sliding scale   SubCutaneous at bedtime  montelukast 10 milliGRAM(s) Oral daily  predniSONE   Tablet 40 milliGRAM(s) Oral daily  simvastatin 20 milliGRAM(s) Oral at bedtime    MEDICATIONS  (PRN):  acetaminophen     Tablet .. 650 milliGRAM(s) Oral every 6 hours PRN Temp greater or equal to 38C (100.4F), Mild Pain (1 - 3)  dextrose Oral Gel 15 Gram(s) Oral once PRN Blood Glucose LESS THAN 70 milliGRAM(s)/deciliter  melatonin 3 milliGRAM(s) Oral at bedtime PRN Insomnia  polyethylene glycol 3350 17 Gram(s) Oral daily PRN Constipation  senna 2 Tablet(s) Oral at bedtime PRN Constipation      Allergies    losartan (Angioedema)    Intolerances    Chicken (Stomach Upset)      LABS:                        12.8   7.48  )-----------( 276      ( 03 Feb 2023 06:10 )             43.7     02-03    140  |  96  |  19<H>  ----------------------------<  127<H>  5.0   |  40<H>  |  0.88    Ca    8.9      03 Feb 2023 06:10  Phos  2.6     02-03  Mg     2.6     02-03                CAPILLARY BLOOD GLUCOSE      POCT Blood Glucose.: 129 mg/dL (03 Feb 2023 07:56)  POCT Blood Glucose.: 138 mg/dL (02 Feb 2023 21:23)  POCT Blood Glucose.: 193 mg/dL (02 Feb 2023 16:29)  POCT Blood Glucose.: 266 mg/dL (02 Feb 2023 11:22)    pro-bnp 3916 01-30 @ 20:20     d-dimer --  01-30 @ 20:20      RADIOLOGY & ADDITIONAL TESTS:    CXR:    Ct scan chest:    ekg;    echo:

## 2023-02-03 NOTE — PROGRESS NOTE ADULT - ASSESSMENT
Patient is a 89 yo F, from home, with PMHx HTN, COPD, Afib, HFpEF (7/29/22 EF 55-60%, LVH, GIDD, Breast CA, and remote h/o LLE DVT p/w SOB and hypoxia to high 70s, admitted to medicine for AHRF 2/2 Acute diastolic CHF +/- COPD exacerbation and UTI (completed treatment). ICU consulted for concerns of AMS in the setting of Acute on chronic compensated respiratory acidosis requiring new trial of Bipap.    AS PER ICU     ASSESSMENT and PLAN  - Acute on chronic respiratory acidosis in the setting of COPD  - Acute encephalopathy likely 2/2 Hypercapnia  - Acute diastolic heart failure  - Klebsiella UTI (completed Rx)  - HLD    Neuro:  #Acute encephalopathy  Pt AAO x 1, lethargic, responsive only to painful stimuli  Baseline AAO x 2, follows commands. Likely in the setting of Acute on chronic respiratory acidosis  Will start Bipap trial    Cardiovascular:  #Acute diastolic heart failure  ECHO July 2022- EF 55-60%, LVH, GIDD, mild pulm HTN  pro-BNP 3916 (prev 1943)  Continued on Lasix 20 mg daily IV (received dose 2/3, will hold for now as patient not clinically overloaded now)  Home meds: Coreg 6.125 mg BID, allergic to Losartan (angioedema), Lasix 40 mg BID  EKG showing Afib, rate controlled  Dr. Siu following    #Afib  Rate controlled, continue Coreg and Eliquis    Pulmonary:   #Acute on chronic respiratory acidosis  Admitted for Acute diastolic heart failure with IV Lasix 40 mg daily  COPD treated with PO Prednisone 40 mg x 5 days (converted to solumedrol IV 40mg daily)  Pt improved with IV Lasix daily> downtitrated to 20 mg daily, was given 1 dose of Acetazolamide 250mg BID Feb 2 due to concern for metabolic alkalosis (acute on chronic)  Pt clinically improving fluid status wise, hence will hold IV lasix (received 2/3 dose), currently lethargy more in-line with respiratory acidosis with COPD  Will start Bipap trial and maintain O2 sats 88-92% given COPD hx, prevent over oxygenation    Infectious Diseases:  #Klebsiella UTI  Completed 3 days of Rocephin    Gastrointestinal:  #NPO except meds given low threshold for intubation    Renal:  #Intermittent hyperkalemia  No hx of kidney disease, multiple samples were hemolyzed, pt was given 2 doses of Lokelma during hospitalization  Allergic to losartan hence no interference of ACEI/ARBS contributing to hyperkalemia  Pt has been on Lasix IV daily, hyperkalemic intermittently despite  Monitor for now    Endo:  #DM  A1c 7.6 Feb 2023, not on any medications  Continue Insulin sliding scale Q6 hours while NPO    #HLD  Hold simvastatin due to NPO low threshold for intubation    Heme/onc:   #Breast cancer  On Anastrozole daily    Skin/ catheter: Peripheral line, [place]    Prophylaxis: Eliquis for AC    Goals of Care: Full code    Dispo: Monitor in ICU for new Bipap

## 2023-02-03 NOTE — PROGRESS NOTE ADULT - SUBJECTIVE AND OBJECTIVE BOX
Patient is seen and examined at the bed side, is afebrile. She is doing much better. The Blood cultures remains negative to date and the sensitivities of  Klebsiella has been reviewed.       REVIEW OF SYSTEMS: All other review systems are negative      ALLERGIES: Chicken (Stomach Upset)  losartan (Angioedema)      ICU Vital Signs Last 24 Hrs  T(C): 36.3 (2023 15:15), Max: 36.8 (2023 12:15)  T(F): 97.4 (2023 15:15), Max: 98.2 (2023 12:15)  HR: 64 (2023 15:31) (64 - 77)  BP: 102/80 (2023 15:15) (102/80 - 122/77)  BP(mean): 85 (2023 15:15) (85 - 85)  ABP: --  ABP(mean): --  RR: 21 (2023 15:15) (20 - 21)  SpO2: 100% (2023 15:31) (98% - 100%)    O2 Parameters below as of 2023 15:15  Patient On (Oxygen Delivery Method): BiPAP/CPAP    O2 Concentration (%): 40          PHYSICAL EXAM:  GENERAL: Not in distress, on oxygen via NC  CHEST/LUNG:  Not using accessory muscles   HEART: s1 and s2 present  ABDOMEN:  Nontender and  Nondistended  EXTREMITIES: No pedal  edema  CNS: Lethargic       LABS:                        12.8   7.48  )-----------( 276      ( 2023 06:10 )             43.7                           12.5   8.23  )-----------( 229      ( 2023 06:45 )             40.8       02-03    140  |  96  |  19<H>  ----------------------------<  127<H>  5.0   |  40<H>  |  0.88    Ca    8.9      2023 06:10  Phos  2.6     02-03  Mg     2.6     02-03      02-02    134<L>  |  90<L>  |  21<H>  ----------------------------<  155<H>  5.4<H>   |  45<HH>  |  0.98    Ca    9.2      2023 08:35  Phos  2.6     02-  Mg     2.6     02-    TPro  6.9  /  Alb  3.2<L>  /  TBili  0.5  /  DBili  x   /  AST  23  /  ALT  25  /  AlkPhos  102  -    PT/INR - ( 2023 18:00 )   PT: 23.8 sec;   INR: 1.99 ratio      PTT - ( 2023 18:00 )  PTT:34.5 sec      CAPILLARY BLOOD GLUCOSE  POCT Blood Glucose.: 250 mg/dL (2023 01:11)  POCT Blood Glucose.: 176 mg/dL (2023 23:47)  POCT Blood Glucose.: 176 mg/dL (2023 16:31)        Urinalysis Basic - ( 2023 21:30 )  Color: Yellow / Appearance: very cloudy / S.010 / pH: x  Gluc: x / Ketone: Negative  / Bili: Negative / Urobili: Negative   Blood: x / Protein: 30 mg/dL / Nitrite: Negative   Leuk Esterase: Moderate / RBC: 25-50 /HPF / WBC >50 /HPF   Sq Epi: x / Non Sq Epi: Few /HPF / Bacteria: Moderate /HPF        MEDICATIONS  (STANDING):    albuterol/ipratropium for Nebulization 3 milliLiter(s) Nebulizer every 6 hours  anastrozole 1 milliGRAM(s) Oral daily  apixaban 5 milliGRAM(s) Oral two times a day  artificial  tears Solution 1 Drop(s) Both EYES two times a day  carvedilol 6.25 milliGRAM(s) Oral every 12 hours  chlorhexidine 2% Cloths 1 Application(s) Topical <User Schedule>  furosemide   Injectable 20 milliGRAM(s) IV Push daily  insulin lispro (ADMELOG) corrective regimen sliding scale   SubCutaneous every 6 hours  pantoprazole  Injectable 40 milliGRAM(s) IV Push daily  predniSONE   Tablet 40 milliGRAM(s) Oral daily        RADIOLOGY & ADDITIONAL TESTS:    23: Xray Chest 1 View- PORTABLE-Urgent (23 @ 22:00) There are increased interstitial markings which may represent the   patient's underlying COPD or pulmonary venous congestion. No focal  consolidation or gross effusion.        MICROBIOLOGY DATA:    Culture - Urine (23 @ 21:30)   Specimen Source: Clean Catch Clean Catch (Midstream)   Culture Results:   >100,000 CFU/ml Klebsiella aerogenes (Previously Enterobacter)     Culture - Blood (23 @ 18:00)   Specimen Source: .Blood Blood-Peripheral   Culture Results: No growth to date.     Culture - Blood (23 @ 17:50)   Specimen Source: .Blood Blood-Peripheral   Culture Results: No growth to date.     Flu With COVID-19 By PATRICK (23 @ 18:00)   SARS-CoV-2 Result: NotDetec             Patient is seen and examined at the bed side, is afebrile. She was obtunded this morning and has transferred to ICU for hypercapnic Respiratory failure.       REVIEW OF SYSTEMS: All other review systems are negative      ALLERGIES: Chicken (Stomach Upset)  losartan (Angioedema)      ICU Vital Signs Last 24 Hrs  T(C): 36.3 (2023 15:15), Max: 36.8 (2023 12:15)  T(F): 97.4 (2023 15:15), Max: 98.2 (2023 12:15)  HR: 64 (2023 15:31) (64 - 77)  BP: 102/80 (2023 15:15) (102/80 - 122/77)  BP(mean): 85 (2023 15:15) (85 - 85)  ABP: --  ABP(mean): --  RR: 21 (2023 15:15) (20 - 21)  SpO2: 100% (2023 15:31) (98% - 100%)    O2 Parameters below as of 2023 15:15  Patient On (Oxygen Delivery Method): BiPAP/CPAP    O2 Concentration (%): 40          PHYSICAL EXAM:  GENERAL: Not in distress, on BIPAP  CHEST/LUNG:  Not using accessory muscles   HEART: s1 and s2 present  ABDOMEN:  Nontender and  Nondistended  EXTREMITIES: B/L  pedal  edema      LABS:                        12.8   7.48  )-----------( 276      ( 2023 06:10 )             43.7                           12.5   8.23  )-----------( 229      ( 2023 06:45 )             40.8       02-03    140  |  96  |  19<H>  ----------------------------<  127<H>  5.0   |  40<H>  |  0.88    Ca    8.9      2023 06:10  Phos  2.6     02-03  Mg     2.6     02-03      02-02    134<L>  |  90<L>  |  21<H>  ----------------------------<  155<H>  5.4<H>   |  45<HH>  |  0.98    Ca    9.2      2023 08:35  Phos  2.6     02-  Mg     2.6     02-02    TPro  6.9  /  Alb  3.2<L>  /  TBili  0.5  /  DBili  x   /  AST  23  /  ALT  25  /  AlkPhos  102  02-    PT/INR - ( 2023 18:00 )   PT: 23.8 sec;   INR: 1.99 ratio      PTT - ( 2023 18:00 )  PTT:34.5 sec      CAPILLARY BLOOD GLUCOSE  POCT Blood Glucose.: 250 mg/dL (2023 01:11)  POCT Blood Glucose.: 176 mg/dL (2023 23:47)  POCT Blood Glucose.: 176 mg/dL (2023 16:31)        Urinalysis Basic - ( 2023 21:30 )  Color: Yellow / Appearance: very cloudy / S.010 / pH: x  Gluc: x / Ketone: Negative  / Bili: Negative / Urobili: Negative   Blood: x / Protein: 30 mg/dL / Nitrite: Negative   Leuk Esterase: Moderate / RBC: 25-50 /HPF / WBC >50 /HPF   Sq Epi: x / Non Sq Epi: Few /HPF / Bacteria: Moderate /HPF        MEDICATIONS  (STANDING):    albuterol/ipratropium for Nebulization 3 milliLiter(s) Nebulizer every 6 hours  anastrozole 1 milliGRAM(s) Oral daily  apixaban 5 milliGRAM(s) Oral two times a day  artificial  tears Solution 1 Drop(s) Both EYES two times a day  carvedilol 6.25 milliGRAM(s) Oral every 12 hours  chlorhexidine 2% Cloths 1 Application(s) Topical <User Schedule>  furosemide   Injectable 20 milliGRAM(s) IV Push daily  insulin lispro (ADMELOG) corrective regimen sliding scale   SubCutaneous every 6 hours  pantoprazole  Injectable 40 milliGRAM(s) IV Push daily  predniSONE   Tablet 40 milliGRAM(s) Oral daily        RADIOLOGY & ADDITIONAL TESTS:    23: Xray Chest 1 View- PORTABLE-Urgent (23 @ 22:00) There are increased interstitial markings which may represent the   patient's underlying COPD or pulmonary venous congestion. No focal  consolidation or gross effusion.        MICROBIOLOGY DATA:    Culture - Urine (23 @ 21:30)   Specimen Source: Clean Catch Clean Catch (Midstream)   Culture Results:   >100,000 CFU/ml Klebsiella aerogenes (Previously Enterobacter)     Culture - Blood (23 @ 18:00)   Specimen Source: .Blood Blood-Peripheral   Culture Results: No growth to date.     Culture - Blood (23 @ 17:50)   Specimen Source: .Blood Blood-Peripheral   Culture Results: No growth to date.     Flu With COVID-19 By PATRICK (23 @ 18:00)   SARS-CoV-2 Result: NotDetec

## 2023-02-03 NOTE — PROGRESS NOTE ADULT - SUBJECTIVE AND OBJECTIVE BOX
CHIEF COMPLAINT:Patient is a 89y old  Female who presents with a chief complaint of AHRF .Pt lethargic.    	  REVIEW OF SYSTEMS:  unable to obtain        PHYSICAL EXAM:  T(C): 36.6 (02-03-23 @ 05:20), Max: 36.7 (02-02-23 @ 20:46)  HR: 77 (02-03-23 @ 05:20) (77 - 84)  BP: 118/56 (02-03-23 @ 05:20) (118/56 - 134/75)  RR: 20 (02-03-23 @ 05:20) (18 - 20)  SpO2: 100% (02-03-23 @ 05:20) (95% - 100%)  Wt(kg): --  I&O's Summary    02 Feb 2023 07:01  -  03 Feb 2023 07:00  --------------------------------------------------------  IN: 0 mL / OUT: 2500 mL / NET: -2500 mL        Appearance: Normal	  HEENT:   Normal oral mucosa, PERRL, EOMI	  Lymphatic: No lymphadenopathy  Cardiovascular: Normal S1 S2, No JVD, No murmurs, +1 edema  Respiratory: Lungs clear to auscultation	  Gastrointestinal:  Soft, Non-tender, + BS	  Skin: No rashes, No ecchymoses, No cyanosis	  Extremities: Normal range of motion, No clubbing, cyanosis +1 edema  Vascular: Peripheral pulses palpable 2+ bilaterally    MEDICATIONS  (STANDING):  albuterol/ipratropium for Nebulization 3 milliLiter(s) Nebulizer every 6 hours  anastrozole 1 milliGRAM(s) Oral daily  apixaban 5 milliGRAM(s) Oral two times a day  artificial  tears Solution 1 Drop(s) Both EYES two times a day  carvedilol 6.25 milliGRAM(s) Oral every 12 hours  dextrose 5%. 1000 milliLiter(s) (50 mL/Hr) IV Continuous <Continuous>  dextrose 5%. 1000 milliLiter(s) (100 mL/Hr) IV Continuous <Continuous>  dextrose 50% Injectable 25 Gram(s) IV Push once  dextrose 50% Injectable 12.5 Gram(s) IV Push once  dextrose 50% Injectable 25 Gram(s) IV Push once  furosemide   Injectable 20 milliGRAM(s) IV Push daily  gabapentin 100 milliGRAM(s) Oral two times a day  glucagon  Injectable 1 milliGRAM(s) IntraMuscular once  guaiFENesin  milliGRAM(s) Oral every 12 hours  insulin lispro (ADMELOG) corrective regimen sliding scale   SubCutaneous three times a day before meals  insulin lispro (ADMELOG) corrective regimen sliding scale   SubCutaneous at bedtime  montelukast 10 milliGRAM(s) Oral daily  predniSONE   Tablet 40 milliGRAM(s) Oral daily  simvastatin 20 milliGRAM(s) Oral at bedtime      	  LABS:	 	                            12.8   7.48  )-----------( 276      ( 03 Feb 2023 06:10 )             43.7     02-03    140  |  96  |  19<H>  ----------------------------<  127<H>  5.0   |  40<H>  |  0.88    Ca    8.9      03 Feb 2023 06:10  Phos  2.6     02-03  Mg     2.6     02-03      proBNP: Serum Pro-Brain Natriuretic Peptide: 3916 pg/mL (01-30 @ 20:20)  Serum Pro-Brain Natriuretic Peptide: 1943 pg/mL (01-11 @ 16:58)      	    Blood Gas Profile - Arterial (02.03.23 @ 11:27)   pH, Arterial: 7.32   pCO2, Arterial: 84: TYPE:(C=Critical, N=Notification, A=Abnormal) C   TESTS: _PCO2=84   DATE/TIME CALLED: _02/03/2023 11:33:26 EST   CALLED TO: DOLORES Gagnon NP   READ BACK (2 Patient Identifiers)(Y/N): _Y   READ BACK VALUES (Y/N): _Y   CALLED BY: CECIL Tinsley RT mmHg

## 2023-02-03 NOTE — PROGRESS NOTE ADULT - SUBJECTIVE AND OBJECTIVE BOX
Patient was seen and examined  Patient is a 89y old  Female who presents with a chief complaint of AHRF (03 Feb 2023 15:59)       INTERVAL HPI/OVERNIGHT EVENTS:  T(C): 36.3 (02-03-23 @ 15:15), Max: 36.8 (02-03-23 @ 12:15)  HR: 69 (02-03-23 @ 16:00) (64 - 77)  BP: 125/75 (02-03-23 @ 16:00) (102/80 - 125/75)  RR: 15 (02-03-23 @ 16:00) (15 - 21)  SpO2: 100% (02-03-23 @ 16:00) (98% - 100%)  Wt(kg): --  I&O's Summary    02 Feb 2023 07:01  -  03 Feb 2023 07:00  --------------------------------------------------------  IN: 0 mL / OUT: 2500 mL / NET: -2500 mL        LABS:                        12.8   7.48  )-----------( 276      ( 03 Feb 2023 06:10 )             43.7     02-03    140  |  96  |  19<H>  ----------------------------<  127<H>  5.0   |  40<H>  |  0.88    Ca    8.9      03 Feb 2023 06:10  Phos  2.6     02-03  Mg     2.6     02-03          CAPILLARY BLOOD GLUCOSE      POCT Blood Glucose.: 255 mg/dL (03 Feb 2023 11:40)  POCT Blood Glucose.: 129 mg/dL (03 Feb 2023 07:56)  POCT Blood Glucose.: 138 mg/dL (02 Feb 2023 21:23)        ABG - ( 03 Feb 2023 11:27 )  pH, Arterial: 7.32  pH, Blood: x     /  pCO2: 84    /  pO2: 91    / HCO3: 43    / Base Excess: 13.2  /  SaO2: 97                    MEDICATIONS  (STANDING):  albuterol/ipratropium for Nebulization 3 milliLiter(s) Nebulizer every 6 hours  anastrozole 1 milliGRAM(s) Oral daily  apixaban 5 milliGRAM(s) Oral two times a day  artificial  tears Solution 1 Drop(s) Both EYES two times a day  carvedilol 6.25 milliGRAM(s) Oral every 12 hours  chlorhexidine 2% Cloths 1 Application(s) Topical <User Schedule>  insulin lispro (ADMELOG) corrective regimen sliding scale   SubCutaneous every 6 hours  methylPREDNISolone sodium succinate Injectable 40 milliGRAM(s) IV Push daily  pantoprazole  Injectable 40 milliGRAM(s) IV Push daily    MEDICATIONS  (PRN):      RADIOLOGY & ADDITIONAL TESTS:    Imaging Personally Reviewed:  [ ] YES  [ ] NO    REVIEW OF SYSTEMS:  unable       Consultant(s) Notes Reviewed:  [ ] YES  [ ] NO    PHYSICAL EXAM:  GENERAL: NAD, well-groomed, well-developed  HEAD:  Atraumatic, Normocephalic  ENMT: No tonsillar erythema, exudates, or enlargement; Moist mucous membranes, Good dentition, No lesions  NECK: Supple, No JVD, Normal thyroid  NERVOUS SYSTEM:  drowsy   CHEST/LUNG: Cbl rhonchi   HEART: s1 s2 systolic murmur   ABDOMEN: Soft, Nontender, Nondistended; Bowel sounds present  EXTREMITIES:  2+ Peripheral Pulses, No clubbing, cyanosis, or edema  LYMPH: No lymphadenopathy noted  SKIN: No rashes or lesions    Care Discussed with Consultants/Other Providers [ x] YES  [ ] NO

## 2023-02-03 NOTE — PROGRESS NOTE ADULT - ASSESSMENT
Patient is a 89y old  Female from home, ambulates with RW, with PMHx HTN, COPD, Afib, HFpEF (7/29/22 EF 55-60%, LVH, GIDD, mild pulmonary HTN), Breast CA, and remote h/o LLE DVT, now presents to the ER for evaluation of cough and shortness of breath. She has had dry cough for over a week which became productive and associated with wheezing over the last few days. Pts O2 saturation has been fluctuating in the 80s over the past week and this morning was in the high 70s, per granddaughters.  Pt was taken to PCP Dr. Bianchi's office who sent her to the ER for admission.. On admission, she has no fever, no Leukocytosis, but found to have positive Urine analysis and negative CXR. She has started on Ceftriaxone and The ID consult requested to assist with further evaluation and antibiotic management.    # Metabolic encephalopathy - resolving   # UTI  - Urine Cx grew Klebsiella  # Hypoxia- on supplemental oxygenation    would recommend:    1. Continue Ceftriaxone to cover Klebsiella   2. Aspiration precaution  3. Diuresis as per Primary team   4. Supplemental oxygenation and bronchodilator as needed  5. Keep Both LE elevated    d/w ICU team and Covering NP, Ray    Attending Attestation:    Spent more than 35 minutes on total encounter, more than 50 % of the visit was spent counseling and/or coordinating care by the Attending physician.  '  Complexity:    # Metabolic encephalopathy   # UTI  # Hypoxia- on supplemental oxygenation Patient is a 89y old  Female from home, ambulates with RW, with PMHx HTN, COPD, Afib, HFpEF (7/29/22 EF 55-60%, LVH, GIDD, mild pulmonary HTN), Breast CA, and remote h/o LLE DVT, now presents to the ER for evaluation of cough and shortness of breath. She has had dry cough for over a week which became productive and associated with wheezing over the last few days. Pts O2 saturation has been fluctuating in the 80s over the past week and this morning was in the high 70s, per granddaughters.  Pt was taken to PCP Dr. Bianchi's office who sent her to the ER for admission.. On admission, she has no fever, no Leukocytosis, but found to have positive Urine analysis and negative CXR. She has started on Ceftriaxone and The ID consult requested to assist with further evaluation and antibiotic management.    # Metabolic encephalopathy - resolving   # UTI  - Urine Cx grew Klebsiella- s/p completed the course of Ceftriaxone   # Hypoxia- on supplemental oxygenation  # Metabolic encephalopathy  # Hypercapnic Respiratory failure- on BIPAP- in ICU - 2/3/23    would recommend:    1. Management orf BIPAP as per ICU protocol   2. Aspiration precaution  3. Diuresis as per Primary team   4. Supplemental oxygenation and bronchodilator as needed  5. Keep Both LE elevated    d/w ICU team and Covering NP, Ray    Attending Attestation:    Spent more than 35 minutes on total encounter, more than 50 % of the visit was spent counseling and/or coordinating care by the Attending physician.  '  Complexity:    # Metabolic encephalopathy   # UTI  # Hypoxia- on supplemental oxygenation

## 2023-02-03 NOTE — DISCHARGE NOTE PROVIDER - ATTENDING ATTESTATION STATEMENT
I have personally seen and examined the patient. I have collaborated with and supervised the 03-Feb-2023 18:26

## 2023-02-03 NOTE — GOALS OF CARE CONVERSATION - ADVANCED CARE PLANNING - CONVERSATION DETAILS
Discussed MOL with grand daughter at bedside - Earnestine [phone]    understands risks of CPR, Ventilator, wants pt to remain full code.

## 2023-02-03 NOTE — CONSULT NOTE ADULT - ATTENDING COMMENTS
IMP :  This is a  89 yr old morbid obese   elderly woman , from home, with  HTN, COPD, Afib, HFpEF (7/29/22 EF 55-60%, LVH, GIDD, Breast CA, and remote h/o LLE DVT p/w SOB and hypoxia to high 70s, admitted to medicine for AHRF 2/2 Acute diastolic CHF +/- COPD exacerbation and UTI (completed treatment). ICU consulted for concerns of AMS in the setting of Acute on chronic compensated respiratory acidosis requiring new trial of Bipap.    ASSESSMENT and PLAN  - Acute on chronic respiratory acidosis in the setting of COPD  - Acute encephalopathy likely 2/2 Hypercapnia  - Acute diastolic heart failure  - Klebsiella UTI (completed Rx)  - HLD      Plan     - Transfer to ICU   - No sedation   - O2 Supp to maintain sat >90%  - Trial of AVAPS   - Will intubate as needed   - Asp precaution   - NPO for PAP devise   - Hemodynamic monitoring   - No antibx  - Duonebs q6h   - Solumedrol 40 mg BID since pat is NPO for now , will change to prednisone po when mental status improve   - Continue cardiac meds   - Hold diuresis , pat is over diurese with volume contraction alkalosis   -  ml LR   -

## 2023-02-03 NOTE — CONSULT NOTE ADULT - ASSESSMENT
Neuro:    Cardiovascular:    Pulmonary:   will maintain O2 sats 88-92% given COPD hx, prevent over oxygenation    Infectious Diseases:    Gastrointestinal:    Renal:    Endo:    Heme/onc:     Skin/ catheter:     Prophylaxis:     Goals of Care:     Dispo:   Patient is a 89 yo F, from home, with PMHx HTN, COPD, Afib, HFpEF (7/29/22 EF 55-60%, LVH, GIDD, Breast CA, and remote h/o LLE DVT p/w SOB and hypoxia to high 70s, admitted to medicine for AHRF 2/2 Acute diastolic CHF +/- COPD exacerbation and UTI (completed treatment). ICU consulted for concerns of AMS in the setting of Acute on chronic compensated respiratory acidosis requiring new trial of Bipap.    ASSESSMENT and PLAN  - Acute on chronic respiratory acidosis in the setting of COPD  - Acute diastolic heart failure  - Klebsiella UTI (completed Rx)    Neuro:    Cardiovascular:    Pulmonary:   will maintain O2 sats 88-92% given COPD hx, prevent over oxygenation    Infectious Diseases:    Gastrointestinal:    Renal:    Endo:    Heme/onc:   #Breast cancer  On Anastrozole daily    Skin/ catheter: Peripheral line, [place]    Prophylaxis: Eliquis for AC    Goals of Care: Full code    Dispo: Monitor in ICU for new Bipap   Patient is a 89 yo F, from home, with PMHx HTN, COPD, Afib, HFpEF (7/29/22 EF 55-60%, LVH, GIDD, Breast CA, and remote h/o LLE DVT p/w SOB and hypoxia to high 70s, admitted to medicine for AHRF 2/2 Acute diastolic CHF +/- COPD exacerbation and UTI (completed treatment). ICU consulted for concerns of AMS in the setting of Acute on chronic compensated respiratory acidosis requiring new trial of Bipap.    ASSESSMENT and PLAN  - Acute on chronic respiratory acidosis in the setting of COPD  - Acute encephalopathy likely 2/2 Hypercapnia  - Acute diastolic heart failure  - Klebsiella UTI (completed Rx)    Neuro:  #Acute encephalopathy  Pt AAO x 1, lethargic, responsive only to painful stimuli  Baseline AAO x 2, follows commands. Likely in the setting of Acute on chronic respiratory acidosis  Will start Bipap trial    Cardiovascular:  #Acute diastolic heart failure  ECHO July 2022- EF 55-60%, LVH, GIDD, mild pulm HTN  Continued on Lasix 20 mg daily IV  Home meds: Coreg 6.125 mg BID, allergic to Losartan (angioedema), Lasix 40 mg BID  EKG showing Afib, rate controlled  Dr. Siu following    #Afib  Rate controlled, continue Coreg and Eliquis    Pulmonary:   #Acute on chronic respiratory acidosis  Admitted for Acute diastolic heart failure with IV Lasix 40 mg daily  COPD treated with PO Prednisone 40 mg x 5 days  Pt improved with IV Lasix daily> downtitrated to 20 mg daily, was given 1 dose of Acetazolamide 250mg BID Feb 2 due to concern for metabolic alkalosis (acute on chronic)  Pt clinically improving fluid status wise, currently lethargy more in-line with respiratory acidosis with COPD  Will start Bipap trial and maintain O2 sats 88-92% given COPD hx, prevent over oxygenation    Infectious Diseases:  #Klebsiella UTI  Completed 3 days of Rocephin    Gastrointestinal:  #NPO except meds given low threshold for intubation    Renal:  #Intermittent hyperkalemia  No hx of kidney disease, multiple samples were hemolyzed, pt was given 2 doses of Lokelma during hospitalization  Allergic to losartan hence no interference of ACEI/ARBS contributing to hyperkalemia  Pt has been on Lasix IV daily   Monitor for now    Endo:  #DM  A1c 7.6 Feb 2023, not on any medications?  Continue Insulin sliding scale Q6 hours while NPO    Heme/onc:   #Breast cancer  On Anastrozole daily    Skin/ catheter: Peripheral line, [place]    Prophylaxis: Eliquis for AC    Goals of Care: Full code    Dispo: Monitor in ICU for new Bipap   Patient is a 89 yo F, from home, with PMHx HTN, COPD, Afib, HFpEF (7/29/22 EF 55-60%, LVH, GIDD, Breast CA, and remote h/o LLE DVT p/w SOB and hypoxia to high 70s, admitted to medicine for AHRF 2/2 Acute diastolic CHF +/- COPD exacerbation and UTI (completed treatment). ICU consulted for concerns of AMS in the setting of Acute on chronic compensated respiratory acidosis requiring new trial of Bipap.    ASSESSMENT and PLAN  - Acute on chronic respiratory acidosis in the setting of COPD  - Acute encephalopathy likely 2/2 Hypercapnia  - Acute diastolic heart failure  - Klebsiella UTI (completed Rx)    Neuro:  #Acute encephalopathy  Pt AAO x 1, lethargic, responsive only to painful stimuli  Baseline AAO x 2, follows commands. Likely in the setting of Acute on chronic respiratory acidosis  Will start Bipap trial    Cardiovascular:  #Acute diastolic heart failure  ECHO July 2022- EF 55-60%, LVH, GIDD, mild pulm HTN  pro-BNP 3916 (prev 1943)  Continued on Lasix 20 mg daily IV  Home meds: Coreg 6.125 mg BID, allergic to Losartan (angioedema), Lasix 40 mg BID  EKG showing Afib, rate controlled  Dr. Siu following    #Afib  Rate controlled, continue Coreg and Eliquis    Pulmonary:   #Acute on chronic respiratory acidosis  Admitted for Acute diastolic heart failure with IV Lasix 40 mg daily  COPD treated with PO Prednisone 40 mg x 5 days  Pt improved with IV Lasix daily> downtitrated to 20 mg daily, was given 1 dose of Acetazolamide 250mg BID Feb 2 due to concern for metabolic alkalosis (acute on chronic)  Pt clinically improving fluid status wise, currently lethargy more in-line with respiratory acidosis with COPD  Will start Bipap trial and maintain O2 sats 88-92% given COPD hx, prevent over oxygenation    Infectious Diseases:  #Klebsiella UTI  Completed 3 days of Rocephin    Gastrointestinal:  #NPO except meds given low threshold for intubation    Renal:  #Intermittent hyperkalemia  No hx of kidney disease, multiple samples were hemolyzed, pt was given 2 doses of Lokelma during hospitalization  Allergic to losartan hence no interference of ACEI/ARBS contributing to hyperkalemia  Pt has been on Lasix IV daily   Monitor for now    Endo:  #DM  A1c 7.6 Feb 2023, not on any medications  Continue Insulin sliding scale Q6 hours while NPO    Heme/onc:   #Breast cancer  On Anastrozole daily    Skin/ catheter: Peripheral line, [place]    Prophylaxis: Eliquis for AC    Goals of Care: Full code    Dispo: Monitor in ICU for new Bipap   Patient is a 89 yo F, from home, with PMHx HTN, COPD, Afib, HFpEF (7/29/22 EF 55-60%, LVH, GIDD, Breast CA, and remote h/o LLE DVT p/w SOB and hypoxia to high 70s, admitted to medicine for AHRF 2/2 Acute diastolic CHF +/- COPD exacerbation and UTI (completed treatment). ICU consulted for concerns of AMS in the setting of Acute on chronic compensated respiratory acidosis requiring new trial of Bipap.    ASSESSMENT and PLAN  - Acute on chronic respiratory acidosis in the setting of COPD  - Acute encephalopathy likely 2/2 Hypercapnia  - Acute diastolic heart failure  - Klebsiella UTI (completed Rx)  - HLD    Neuro:  #Acute encephalopathy  Pt AAO x 1, lethargic, responsive only to painful stimuli  Baseline AAO x 2, follows commands. Likely in the setting of Acute on chronic respiratory acidosis  Will start Bipap trial    Cardiovascular:  #Acute diastolic heart failure  ECHO July 2022- EF 55-60%, LVH, GIDD, mild pulm HTN  pro-BNP 3916 (prev 1943)  Continued on Lasix 20 mg daily IV  Home meds: Coreg 6.125 mg BID, allergic to Losartan (angioedema), Lasix 40 mg BID  EKG showing Afib, rate controlled  Dr. Siu following    #Afib  Rate controlled, continue Coreg and Eliquis    Pulmonary:   #Acute on chronic respiratory acidosis  Admitted for Acute diastolic heart failure with IV Lasix 40 mg daily  COPD treated with PO Prednisone 40 mg x 5 days  Pt improved with IV Lasix daily> downtitrated to 20 mg daily, was given 1 dose of Acetazolamide 250mg BID Feb 2 due to concern for metabolic alkalosis (acute on chronic)  Pt clinically improving fluid status wise, currently lethargy more in-line with respiratory acidosis with COPD  Will start Bipap trial and maintain O2 sats 88-92% given COPD hx, prevent over oxygenation    Infectious Diseases:  #Klebsiella UTI  Completed 3 days of Rocephin    Gastrointestinal:  #NPO except meds given low threshold for intubation    Renal:  #Intermittent hyperkalemia  No hx of kidney disease, multiple samples were hemolyzed, pt was given 2 doses of Lokelma during hospitalization  Allergic to losartan hence no interference of ACEI/ARBS contributing to hyperkalemia  Pt has been on Lasix IV daily   Monitor for now    Endo:  #DM  A1c 7.6 Feb 2023, not on any medications  Continue Insulin sliding scale Q6 hours while NPO    #HLD  Hold simvastatin due to NPO low threshold for intubation    Heme/onc:   #Breast cancer  On Anastrozole daily    Skin/ catheter: Peripheral line, [place]    Prophylaxis: Eliquis for AC    Goals of Care: Full code    Dispo: Monitor in ICU for new Bipap   Patient is a 89 yo F, from home, with PMHx HTN, COPD, Afib, HFpEF (7/29/22 EF 55-60%, LVH, GIDD, Breast CA, and remote h/o LLE DVT p/w SOB and hypoxia to high 70s, admitted to medicine for AHRF 2/2 Acute diastolic CHF +/- COPD exacerbation and UTI (completed treatment). ICU consulted for concerns of AMS in the setting of Acute on chronic compensated respiratory acidosis requiring new trial of Bipap.    ASSESSMENT and PLAN  - Acute on chronic respiratory acidosis in the setting of COPD  - Acute encephalopathy likely 2/2 Hypercapnia  - Acute diastolic heart failure  - Klebsiella UTI (completed Rx)  - HLD    Neuro:  #Acute encephalopathy  Pt AAO x 1, lethargic, responsive only to painful stimuli  Baseline AAO x 2, follows commands. Likely in the setting of Acute on chronic respiratory acidosis  Will start Bipap trial    Cardiovascular:  #Acute diastolic heart failure  ECHO July 2022- EF 55-60%, LVH, GIDD, mild pulm HTN  pro-BNP 3916 (prev 1943)  Continued on Lasix 20 mg daily IV (received dose 2/3, will hold for now as patient not clinically overloaded now)  Home meds: Coreg 6.125 mg BID, allergic to Losartan (angioedema), Lasix 40 mg BID  EKG showing Afib, rate controlled  Dr. Siu following    #Afib  Rate controlled, continue Coreg and Eliquis    Pulmonary:   #Acute on chronic respiratory acidosis  Admitted for Acute diastolic heart failure with IV Lasix 40 mg daily  COPD treated with PO Prednisone 40 mg x 5 days (converted to solumedrol IV 40mg daily)  Pt improved with IV Lasix daily> downtitrated to 20 mg daily, was given 1 dose of Acetazolamide 250mg BID Feb 2 due to concern for metabolic alkalosis (acute on chronic)  Pt clinically improving fluid status wise, hence will hold IV lasix (received 2/3 dose), currently lethargy more in-line with respiratory acidosis with COPD  Will start Bipap trial and maintain O2 sats 88-92% given COPD hx, prevent over oxygenation    Infectious Diseases:  #Klebsiella UTI  Completed 3 days of Rocephin    Gastrointestinal:  #NPO except meds given low threshold for intubation    Renal:  #Intermittent hyperkalemia  No hx of kidney disease, multiple samples were hemolyzed, pt was given 2 doses of Lokelma during hospitalization  Allergic to losartan hence no interference of ACEI/ARBS contributing to hyperkalemia  Pt has been on Lasix IV daily, hyperkalemic intermittently despite  Monitor for now    Endo:  #DM  A1c 7.6 Feb 2023, not on any medications  Continue Insulin sliding scale Q6 hours while NPO    #HLD  Hold simvastatin due to NPO low threshold for intubation    Heme/onc:   #Breast cancer  On Anastrozole daily    Skin/ catheter: Peripheral line, [place]    Prophylaxis: Eliquis for AC    Goals of Care: Full code    Dispo: Monitor in ICU for new Bipap

## 2023-02-03 NOTE — PROGRESS NOTE ADULT - ASSESSMENT
89 year old Female, from home, ambulates with RW, with PMHx HTN, COPD, Afib, HFpEF (7/29/22 EF 55-60%, LVH, GIDD, mild pulmonary HTN), Breast CA, and remote h/o LLE DVT presents with cough and shortness of breath for a weeks duration,acute diastolic HF,acute on chronic resp failure with CO2 retention..  1.CO2 retention-ICU eval called.  2.Afib-eliquis,coreg.  3.COPD-Pulm f/u.  4.HTN-coreg.  5.PPI.  6.Sleep study-r/o giana.  7.Acute diastolic HF-IV lasix.  8.Daughter states patient is full code.

## 2023-02-03 NOTE — CONSULT NOTE ADULT - SUBJECTIVE AND OBJECTIVE BOX
Patient is a 89y old  Female who presents with a chief complaint of AHRF (2023 11:52)      HPI:  89 year old Female, from home, ambulates with RW, with PMHx HTN, COPD, Afib, HFpEF (22 EF 55-60%, LVH, GIDD, mild pulmonary HTN), Breast CA, and remote h/o LLE DVT presents with cough and shortness of breath for a weeks duration. Per granddaughters at bedside, pt has had dry cough for over a week which became productiev and associated with wheezing over the last few days. Pts O2 saturation has b een fluctuating in the 80s over the past week and this morning was in the high 70s, per granddaughters.  Pt was taken to PCP Dr. Bianchi's office who sent her to the ED for admission. The patient denies any other symptoms including fever, chills ,headache, dizziness, chest pain, n/v/d though has occasional constipation. Also denies urinary complaints or leg swelling. Pt is allergic to Losartan (angioedema).    In ED:   VS 98.2F, 93 HR, 134/94 BP, 21 RR, 95% on 3L NC   K+ 5.6  EKG: rate controlled afib (TWI in V1-4, seen prior)  pro-BNP 3916 (prev 1943)  CXR: b/l interstitial infiltrates (wet read)  s/p 40 lasix and 125mg solumedrol in ED     (2023 21:22)    Interval HPI: Patient is a 89 yo Female, from home, uses walker, w/med hx significant for HTN, COPD (not on home O2), Afib on Eliquis, HFpEF (2022 EF 55-60%, LVH, Grade I DD, mild pulm HTN), Breast cancer, remote prior DVT, admitted for Acute hypoxic respiratory failure due to Acute diastolic HF, treated with IV Lasix, also started on Prednisone PO x 5 day regimen. Pt was noted to be hyperkalemic (also had hemolyzed samples), was treated with Lokelma and Insulin pushes. ICU was consulted today due to concerns for worsening alteration in mental status, patient is much more sleepy and responds predominantly to painful stimuli/ when she was actively being moved. ABG with Acute on chronic compensated respiratory acidosis, being admitted to ICU for new Bipap trial.      Allergies    losartan (Angioedema)    Intolerances    Chicken (Stomach Upset)      MEDICATIONS  (STANDING):  albuterol/ipratropium for Nebulization 3 milliLiter(s) Nebulizer every 6 hours  anastrozole 1 milliGRAM(s) Oral daily  apixaban 5 milliGRAM(s) Oral two times a day  artificial  tears Solution 1 Drop(s) Both EYES two times a day  carvedilol 6.25 milliGRAM(s) Oral every 12 hours  chlorhexidine 2% Cloths 1 Application(s) Topical <User Schedule>  furosemide   Injectable 20 milliGRAM(s) IV Push daily  gabapentin 100 milliGRAM(s) Oral two times a day  guaiFENesin  milliGRAM(s) Oral every 12 hours  insulin lispro (ADMELOG) corrective regimen sliding scale   SubCutaneous three times a day before meals  insulin lispro (ADMELOG) corrective regimen sliding scale   SubCutaneous at bedtime  montelukast 10 milliGRAM(s) Oral daily  predniSONE   Tablet 40 milliGRAM(s) Oral daily  simvastatin 20 milliGRAM(s) Oral at bedtime    MEDICATIONS  (PRN):  acetaminophen     Tablet .. 650 milliGRAM(s) Oral every 6 hours PRN Temp greater or equal to 38C (100.4F), Mild Pain (1 - 3)  melatonin 3 milliGRAM(s) Oral at bedtime PRN Insomnia  polyethylene glycol 3350 17 Gram(s) Oral daily PRN Constipation  senna 2 Tablet(s) Oral at bedtime PRN Constipation      Daily     Daily Weight in k.3 (2023 05:20)    Drug Dosing Weight  Height (cm): 154.9 (2023 16:23)  Weight (kg): 136.1 (2023 16:23)  BMI (kg/m2): 56.7 (2023 16:23)  BSA (m2): 2.24 (2023 16:23)    PAST MEDICAL & SURGICAL HISTORY:  Arthritis      Legally blind      Pre-diabetes      COPD exacerbation      Atrial fibrillation      HF (heart failure)  HFpEF       Breast cancer  right, no chemo or radiation      HTN (hypertension)      Deep vein thrombosis (DVT)  Left Lower Extremity      H/O CHF      No significant past surgical history          FAMILY HISTORY:  FH: HTN (hypertension)        SOCIAL HISTORY:    ADVANCE DIRECTIVES:      REVIEW OF SYSTEMS: Pt lethargic, nods no when asked about chest pain, SOB, limited ROS due to AMS    CONSTITUTIONAL: No weakness, fevers or chills  EYES/ENT: No visual changes;  No vertigo or throat pain   NECK: No pain or stiffness  RESPIRATORY: No cough, wheezing, hemoptysis; No shortness of breath  CARDIOVASCULAR: No chest pain or palpitations  GASTROINTESTINAL: No abdominal or epigastric pain. No nausea, vomiting, or hematemesis; No diarrhea or constipation. No melena or hematochezia.  GENITOURINARY: No dysuria, frequency or hematuria  NEUROLOGICAL: No numbness or weakness  SKIN: No itching, burning, rashes, or lesions   All other review of systems is negative unless indicated above.          ICU Vital Signs Last 24 Hrs  T(C): 36.6 (2023 05:20), Max: 36.7 (2023 20:46)  T(F): 97.8 (2023 05:20), Max: 98.1 (2023 20:46)  HR: 77 (2023 05:20) (77 - 77)  BP: 118/56 (2023 05:20) (118/56 - 122/77)  BP(mean): --  ABP: --  ABP(mean): --  RR: 20 (2023 05:20) (20 - 20)  SpO2: 100% (2023 05:20) (99% - 100%)    O2 Parameters below as of 2023 05:20  Patient On (Oxygen Delivery Method): nasal cannula  O2 Flow (L/min): 2          ABG - ( 2023 11:27 )  pH, Arterial: 7.32  pH, Blood: x     /  pCO2: 84    /  pO2: 91    / HCO3: 43    / Base Excess: 13.2  /  SaO2: 97                  I&O's Detail    2023 07:01  -  2023 07:00  --------------------------------------------------------  IN:  Total IN: 0 mL    OUT:    Voided (mL): 2500 mL  Total OUT: 2500 mL    Total NET: -2500 mL          PHYSICAL EXAM:  GENERAL: Elderly obese female sitting upright, overall lethargic, not in distress  HEAD:  Atraumatic, Normocephalic  EYES: EOMI, PERRLA, conjunctiva and sclera clear  NECK: Supple, No JVD  CHEST/LUNG: Clear to auscultation bilaterally; No wheeze  HEART: Regular rate and rhythm; s1+ s2+  ABDOMEN: Soft, Nontender, Nondistended; Bowel sounds present  EXTREMITIES:, chronic LE edema ACE wrapped   NEUROLOGY: AAO x 1, responds to sternal rub/painful stimulus, moving extremities  SKIN: No rashes or lesions    LABS:  CBC Full  -  ( 2023 06:10 )  WBC Count : 7.48 K/uL  RBC Count : 4.17 M/uL  Hemoglobin : 12.8 g/dL  Hematocrit : 43.7 %  Platelet Count - Automated : 276 K/uL  Mean Cell Volume : 104.8 fl  Mean Cell Hemoglobin : 30.7 pg  Mean Cell Hemoglobin Concentration : 29.3 gm/dL  Auto Neutrophil # : x  Auto Lymphocyte # : x  Auto Monocyte # : x  Auto Eosinophil # : x  Auto Basophil # : x  Auto Neutrophil % : x  Auto Lymphocyte % : x  Auto Monocyte % : x  Auto Eosinophil % : x  Auto Basophil % : x    02-03    140  |  96  |  19<H>  ----------------------------<  127<H>  5.0   |  40<H>  |  0.88    Ca    8.9      2023 06:10  Phos  2.6     02-03  Mg     2.6     02-03      CAPILLARY BLOOD GLUCOSE      POCT Blood Glucose.: 255 mg/dL (2023 11:40)             Patient is a 89y old  Female who presents with a chief complaint of AHRF (2023 11:52)      HPI:  89 year old Female, from home, ambulates with RW, with PMHx HTN, COPD, Afib, HFpEF (22 EF 55-60%, LVH, GIDD, mild pulmonary HTN), Breast CA, and remote h/o LLE DVT presents with cough and shortness of breath for a weeks duration. Per granddaughters at bedside, pt has had dry cough for over a week which became productiev and associated with wheezing over the last few days. Pts O2 saturation has b een fluctuating in the 80s over the past week and this morning was in the high 70s, per granddaughters.  Pt was taken to PCP Dr. Bianchi's office who sent her to the ED for admission. The patient denies any other symptoms including fever, chills ,headache, dizziness, chest pain, n/v/d though has occasional constipation. Also denies urinary complaints or leg swelling. Pt is allergic to Losartan (angioedema).    In ED:   VS 98.2F, 93 HR, 134/94 BP, 21 RR, 95% on 3L NC   K+ 5.6  EKG: rate controlled afib (TWI in V1-4, seen prior)  pro-BNP 3916 (prev 1943)  CXR: b/l interstitial infiltrates (wet read)  s/p 40 lasix and 125mg solumedrol in ED     (2023 21:22)    Interval HPI: Patient is a 89 yo Female, from home, uses walker, w/med hx significant for HTN, COPD (not on home O2), Afib on Eliquis, HFpEF (2022 EF 55-60%, LVH, Grade I DD, mild pulm HTN), Breast cancer, remote prior DVT, admitted for Acute hypoxic respiratory failure due to Acute diastolic HF, treated with IV Lasix, also started on Prednisone PO x 5 day regimen. Pt was noted to be hyperkalemic (also had hemolyzed samples), was treated with Lokelma and Insulin pushes. ICU was consulted today due to concerns for worsening alteration in mental status, patient is much more sleepy and responds predominantly to painful stimuli/ when she was actively being moved. ABG with Acute on chronic compensated respiratory acidosis, being admitted to ICU for new Bipap trial.      Allergies    losartan (Angioedema)    Intolerances    Chicken (Stomach Upset)      MEDICATIONS  (STANDING):  albuterol/ipratropium for Nebulization 3 milliLiter(s) Nebulizer every 6 hours  anastrozole 1 milliGRAM(s) Oral daily  apixaban 5 milliGRAM(s) Oral two times a day  artificial  tears Solution 1 Drop(s) Both EYES two times a day  carvedilol 6.25 milliGRAM(s) Oral every 12 hours  chlorhexidine 2% Cloths 1 Application(s) Topical <User Schedule>  furosemide   Injectable 20 milliGRAM(s) IV Push daily  gabapentin 100 milliGRAM(s) Oral two times a day  guaiFENesin  milliGRAM(s) Oral every 12 hours  insulin lispro (ADMELOG) corrective regimen sliding scale   SubCutaneous three times a day before meals  insulin lispro (ADMELOG) corrective regimen sliding scale   SubCutaneous at bedtime  montelukast 10 milliGRAM(s) Oral daily  predniSONE   Tablet 40 milliGRAM(s) Oral daily  simvastatin 20 milliGRAM(s) Oral at bedtime    MEDICATIONS  (PRN):  acetaminophen     Tablet .. 650 milliGRAM(s) Oral every 6 hours PRN Temp greater or equal to 38C (100.4F), Mild Pain (1 - 3)  melatonin 3 milliGRAM(s) Oral at bedtime PRN Insomnia  polyethylene glycol 3350 17 Gram(s) Oral daily PRN Constipation  senna 2 Tablet(s) Oral at bedtime PRN Constipation      Daily     Daily Weight in k.3 (2023 05:20)    Drug Dosing Weight  Height (cm): 154.9 (2023 16:23)  Weight (kg): 136.1 (2023 16:23)  BMI (kg/m2): 56.7 (2023 16:23)  BSA (m2): 2.24 (2023 16:23)    PAST MEDICAL & SURGICAL HISTORY:  Arthritis      Legally blind      Pre-diabetes      COPD exacerbation      Atrial fibrillation      HF (heart failure)  HFpEF       Breast cancer  right, no chemo or radiation      HTN (hypertension)      Deep vein thrombosis (DVT)  Left Lower Extremity      H/O CHF      No significant past surgical history          FAMILY HISTORY:  FH: HTN (hypertension)        SOCIAL HISTORY:    ADVANCE DIRECTIVES:      REVIEW OF SYSTEMS: Pt lethargic, nods no when asked about chest pain, SOB, limited ROS due to AMS    CONSTITUTIONAL: No weakness, fevers or chills  EYES/ENT: No visual changes;  No vertigo or throat pain   NECK: No pain or stiffness  RESPIRATORY: No cough, wheezing, hemoptysis; No shortness of breath  CARDIOVASCULAR: No chest pain or palpitations  GASTROINTESTINAL: No abdominal or epigastric pain. No nausea, vomiting, or hematemesis; No diarrhea or constipation. No melena or hematochezia.  GENITOURINARY: No dysuria, frequency or hematuria  NEUROLOGICAL: No numbness or weakness  SKIN: No itching, burning, rashes, or lesions   All other review of systems is negative unless indicated above.          ICU Vital Signs Last 24 Hrs  T(C): 36.6 (2023 05:20), Max: 36.7 (2023 20:46)  T(F): 97.8 (2023 05:20), Max: 98.1 (2023 20:46)  HR: 77 (2023 05:20) (77 - 77)  BP: 118/56 (2023 05:20) (118/56 - 122/77)  BP(mean): --  ABP: --  ABP(mean): --  RR: 20 (2023 05:20) (20 - 20)  SpO2: 100% (2023 05:20) (99% - 100%)    O2 Parameters below as of 2023 05:20  Patient On (Oxygen Delivery Method): nasal cannula  O2 Flow (L/min): 2          ABG - ( 2023 11:27 )  pH, Arterial: 7.32  pH, Blood: x     /  pCO2: 84    /  pO2: 91    / HCO3: 43    / Base Excess: 13.2  /  SaO2: 97                  I&O's Detail    2023 07:01  -  2023 07:00  --------------------------------------------------------  IN:  Total IN: 0 mL    OUT:    Voided (mL): 2500 mL  Total OUT: 2500 mL    Total NET: -2500 mL          PHYSICAL EXAM:  GENERAL: Elderly obese female sitting upright, overall lethargic, not in distress  HEAD:  Atraumatic, Normocephalic  EYES: EOMI, PERRLA, conjunctiva and sclera clear  NECK: Supple, No JVD  CHEST/LUNG: Clear to auscultation bilaterally; No wheeze  HEART: Regular rate and rhythm; s1+ s2+  ABDOMEN: Soft, Nontender, Nondistended; Bowel sounds present  EXTREMITIES:, chronic LE edema ACE wrapped   NEUROLOGY: AAO x 1, responds to sternal rub/painful stimulus, moving extremities  SKIN: No rashes or lesions    LABS:  CBC Full  -  ( 2023 06:10 )  WBC Count : 7.48 K/uL  RBC Count : 4.17 M/uL  Hemoglobin : 12.8 g/dL  Hematocrit : 43.7 %  Platelet Count - Automated : 276 K/uL  Mean Cell Volume : 104.8 fl  Mean Cell Hemoglobin : 30.7 pg  Mean Cell Hemoglobin Concentration : 29.3 gm/dL  Auto Neutrophil # : x  Auto Lymphocyte # : x  Auto Monocyte # : x  Auto Eosinophil # : x  Auto Basophil # : x  Auto Neutrophil % : x  Auto Lymphocyte % : x  Auto Monocyte % : x  Auto Eosinophil % : x  Auto Basophil % : x    02-03    140  |  96  |  19<H>  ----------------------------<  127<H>  5.0   |  40<H>  |  0.88    Ca    8.9      2023 06:10  Phos  2.6     02-03  Mg     2.6     02-03      CAPILLARY BLOOD GLUCOSE      POCT Blood Glucose.: 255 mg/dL (2023 11:40)

## 2023-02-03 NOTE — CONSULT NOTE ADULT - PARTICIPANTS
Patient's baseline mental status is AAO x 2 hence spoke to patient's grand-daughter Earnestine [phone] and patient's daughter Mirela who was on speaker with Earnestine- explained patient's current clinical status, her prognosis and necessity to monitor patient closely in the ICU. Explained to the family that she has been admitted to the hospital multiple times over the last year for similar complaints and given her baseline mental status, age, and multiple medical co-morbidities, patient would not benefit from aggressive resuscitative measures such as CPR and intubation. The daughter and grand-daughter expressed understanding, and stated that they would want all life-saving measures done for her and to 'bring her back' in case of clinical deterioration and CPR is warranted. FULL CODE./Family Patient's baseline mental status is AAO x 2 hence spoke to patient's grand-daughter Earnestine [phone] and patient's daughter Mirela who was on speaker with Earnestine- explained patient's current clinical status, her prognosis and necessity to monitor patient closely in the ICU. Explained to the family that she has been admitted to the hospital multiple times over the last year for similar complaints and given her baseline mental status, age, and multiple medical co-morbidities, patient would not benefit from aggressive resuscitative measures such as CPR and intubation. The daughter and grand-daughter expressed understanding, and stated that they would want all life-saving measures done for her and to 'bring her back' in case of clinical deterioration and CPR is warranted. When alerted that if Bipap was unsuccessful, we will need to intubate, she says 'it will be successful' hence patient will be FULL CODE./Family

## 2023-02-04 DIAGNOSIS — R41.82 ALTERED MENTAL STATUS, UNSPECIFIED: ICD-10-CM

## 2023-02-04 DIAGNOSIS — G93.41 METABOLIC ENCEPHALOPATHY: ICD-10-CM

## 2023-02-04 DIAGNOSIS — C50.919 MALIGNANT NEOPLASM OF UNSPECIFIED SITE OF UNSPECIFIED FEMALE BREAST: ICD-10-CM

## 2023-02-04 DIAGNOSIS — Z51.5 ENCOUNTER FOR PALLIATIVE CARE: ICD-10-CM

## 2023-02-04 DIAGNOSIS — Z71.89 OTHER SPECIFIED COUNSELING: ICD-10-CM

## 2023-02-04 DIAGNOSIS — R53.81 OTHER MALAISE: ICD-10-CM

## 2023-02-04 DIAGNOSIS — E11.9 TYPE 2 DIABETES MELLITUS WITHOUT COMPLICATIONS: ICD-10-CM

## 2023-02-04 DIAGNOSIS — Z87.09 PERSONAL HISTORY OF OTHER DISEASES OF THE RESPIRATORY SYSTEM: ICD-10-CM

## 2023-02-04 LAB
ALBUMIN SERPL ELPH-MCNC: 2.9 G/DL — LOW (ref 3.5–5)
ALP SERPL-CCNC: 106 U/L — SIGNIFICANT CHANGE UP (ref 40–120)
ALT FLD-CCNC: 26 U/L DA — SIGNIFICANT CHANGE UP (ref 10–60)
ANION GAP SERPL CALC-SCNC: 3 MMOL/L — LOW (ref 5–17)
AST SERPL-CCNC: 14 U/L — SIGNIFICANT CHANGE UP (ref 10–40)
BASE EXCESS BLDA CALC-SCNC: 13.4 MMOL/L — HIGH (ref -2–3)
BASE EXCESS BLDV CALC-SCNC: 11.2 MMOL/L — SIGNIFICANT CHANGE UP
BASOPHILS # BLD AUTO: 0.01 K/UL — SIGNIFICANT CHANGE UP (ref 0–0.2)
BASOPHILS NFR BLD AUTO: 0.2 % — SIGNIFICANT CHANGE UP (ref 0–2)
BILIRUB SERPL-MCNC: 0.8 MG/DL — SIGNIFICANT CHANGE UP (ref 0.2–1.2)
BLOOD GAS COMMENTS ARTERIAL: SIGNIFICANT CHANGE UP
BUN SERPL-MCNC: 20 MG/DL — HIGH (ref 7–18)
CALCIUM SERPL-MCNC: 9.4 MG/DL — SIGNIFICANT CHANGE UP (ref 8.4–10.5)
CHLORIDE SERPL-SCNC: 99 MMOL/L — SIGNIFICANT CHANGE UP (ref 96–108)
CO2 SERPL-SCNC: 33 MMOL/L — HIGH (ref 22–31)
CREAT SERPL-MCNC: 0.82 MG/DL — SIGNIFICANT CHANGE UP (ref 0.5–1.3)
CULTURE RESULTS: SIGNIFICANT CHANGE UP
CULTURE RESULTS: SIGNIFICANT CHANGE UP
EGFR: 67 ML/MIN/1.73M2 — SIGNIFICANT CHANGE UP
EOSINOPHIL # BLD AUTO: 0 K/UL — SIGNIFICANT CHANGE UP (ref 0–0.5)
EOSINOPHIL NFR BLD AUTO: 0 % — SIGNIFICANT CHANGE UP (ref 0–6)
GAS PNL BLDA: SIGNIFICANT CHANGE UP
GLUCOSE BLDC GLUCOMTR-MCNC: 163 MG/DL — HIGH (ref 70–99)
GLUCOSE BLDC GLUCOMTR-MCNC: 164 MG/DL — HIGH (ref 70–99)
GLUCOSE BLDC GLUCOMTR-MCNC: 167 MG/DL — HIGH (ref 70–99)
GLUCOSE BLDC GLUCOMTR-MCNC: 194 MG/DL — HIGH (ref 70–99)
GLUCOSE SERPL-MCNC: 174 MG/DL — HIGH (ref 70–99)
HCO3 BLDA-SCNC: 42 MMOL/L — HIGH (ref 21–28)
HCO3 BLDV-SCNC: 41 MMOL/L — HIGH (ref 22–29)
HCT VFR BLD CALC: 48.7 % — HIGH (ref 34.5–45)
HGB BLD-MCNC: 14.7 G/DL — SIGNIFICANT CHANGE UP (ref 11.5–15.5)
HOROWITZ INDEX BLDA+IHG-RTO: 40 — SIGNIFICANT CHANGE UP
HOROWITZ INDEX BLDV+IHG-RTO: 28 — SIGNIFICANT CHANGE UP
IMM GRANULOCYTES NFR BLD AUTO: 0.5 % — SIGNIFICANT CHANGE UP (ref 0–0.9)
LYMPHOCYTES # BLD AUTO: 0.88 K/UL — LOW (ref 1–3.3)
LYMPHOCYTES # BLD AUTO: 15.8 % — SIGNIFICANT CHANGE UP (ref 13–44)
MAGNESIUM SERPL-MCNC: 2.6 MG/DL — SIGNIFICANT CHANGE UP (ref 1.6–2.6)
MCHC RBC-ENTMCNC: 30.2 GM/DL — LOW (ref 32–36)
MCHC RBC-ENTMCNC: 31.2 PG — SIGNIFICANT CHANGE UP (ref 27–34)
MCV RBC AUTO: 103.4 FL — HIGH (ref 80–100)
MONOCYTES # BLD AUTO: 0.16 K/UL — SIGNIFICANT CHANGE UP (ref 0–0.9)
MONOCYTES NFR BLD AUTO: 2.9 % — SIGNIFICANT CHANGE UP (ref 2–14)
NEUTROPHILS # BLD AUTO: 4.5 K/UL — SIGNIFICANT CHANGE UP (ref 1.8–7.4)
NEUTROPHILS NFR BLD AUTO: 80.6 % — HIGH (ref 43–77)
NRBC # BLD: 0 /100 WBCS — SIGNIFICANT CHANGE UP (ref 0–0)
PCO2 BLDA: 75 MMHG — CRITICAL HIGH (ref 32–35)
PCO2 BLDV: 84 MMHG — HIGH (ref 39–42)
PH BLDA: 7.36 — SIGNIFICANT CHANGE UP (ref 7.35–7.45)
PH BLDV: 7.3 — LOW (ref 7.32–7.43)
PHOSPHATE SERPL-MCNC: 3.4 MG/DL — SIGNIFICANT CHANGE UP (ref 2.5–4.5)
PLATELET # BLD AUTO: 253 K/UL — SIGNIFICANT CHANGE UP (ref 150–400)
PO2 BLDA: 111 MMHG — HIGH (ref 83–108)
PO2 BLDV: 32 MMHG — SIGNIFICANT CHANGE UP
POTASSIUM SERPL-MCNC: 4.5 MMOL/L — SIGNIFICANT CHANGE UP (ref 3.5–5.3)
POTASSIUM SERPL-SCNC: 4.5 MMOL/L — SIGNIFICANT CHANGE UP (ref 3.5–5.3)
PROT SERPL-MCNC: 6.8 G/DL — SIGNIFICANT CHANGE UP (ref 6–8.3)
RBC # BLD: 4.71 M/UL — SIGNIFICANT CHANGE UP (ref 3.8–5.2)
RBC # FLD: 13.2 % — SIGNIFICANT CHANGE UP (ref 10.3–14.5)
SAO2 % BLDA: 99 % — SIGNIFICANT CHANGE UP
SAO2 % BLDV: 39.1 % — SIGNIFICANT CHANGE UP
SODIUM SERPL-SCNC: 135 MMOL/L — SIGNIFICANT CHANGE UP (ref 135–145)
SPECIMEN SOURCE: SIGNIFICANT CHANGE UP
SPECIMEN SOURCE: SIGNIFICANT CHANGE UP
WBC # BLD: 5.58 K/UL — SIGNIFICANT CHANGE UP (ref 3.8–10.5)
WBC # FLD AUTO: 5.58 K/UL — SIGNIFICANT CHANGE UP (ref 3.8–10.5)

## 2023-02-04 PROCEDURE — 99222 1ST HOSP IP/OBS MODERATE 55: CPT

## 2023-02-04 RX ORDER — APIXABAN 2.5 MG/1
5 TABLET, FILM COATED ORAL EVERY 12 HOURS
Refills: 0 | Status: DISCONTINUED | OUTPATIENT
Start: 2023-02-04 | End: 2023-02-20

## 2023-02-04 RX ORDER — SIMVASTATIN 20 MG/1
20 TABLET, FILM COATED ORAL AT BEDTIME
Refills: 0 | Status: DISCONTINUED | OUTPATIENT
Start: 2023-02-04 | End: 2023-02-20

## 2023-02-04 RX ADMIN — Medication 3 MILLILITER(S): at 22:26

## 2023-02-04 RX ADMIN — Medication 3 MILLILITER(S): at 14:15

## 2023-02-04 RX ADMIN — CHLORHEXIDINE GLUCONATE 1 APPLICATION(S): 213 SOLUTION TOPICAL at 06:10

## 2023-02-04 RX ADMIN — Medication 40 MILLIGRAM(S): at 00:22

## 2023-02-04 RX ADMIN — Medication 1 DROP(S): at 18:28

## 2023-02-04 RX ADMIN — ENOXAPARIN SODIUM 120 MILLIGRAM(S): 100 INJECTION SUBCUTANEOUS at 06:09

## 2023-02-04 RX ADMIN — Medication 3 MILLILITER(S): at 08:14

## 2023-02-04 RX ADMIN — Medication 1 DROP(S): at 06:09

## 2023-02-04 RX ADMIN — Medication 3 MILLILITER(S): at 03:54

## 2023-02-04 RX ADMIN — CARVEDILOL PHOSPHATE 6.25 MILLIGRAM(S): 80 CAPSULE, EXTENDED RELEASE ORAL at 18:28

## 2023-02-04 RX ADMIN — Medication 40 MILLIGRAM(S): at 06:09

## 2023-02-04 RX ADMIN — Medication 1: at 11:36

## 2023-02-04 RX ADMIN — Medication 1: at 18:38

## 2023-02-04 RX ADMIN — APIXABAN 5 MILLIGRAM(S): 2.5 TABLET, FILM COATED ORAL at 18:27

## 2023-02-04 RX ADMIN — Medication 1: at 06:25

## 2023-02-04 RX ADMIN — PANTOPRAZOLE SODIUM 40 MILLIGRAM(S): 20 TABLET, DELAYED RELEASE ORAL at 14:30

## 2023-02-04 RX ADMIN — ANASTROZOLE 1 MILLIGRAM(S): 1 TABLET ORAL at 11:37

## 2023-02-04 RX ADMIN — SIMVASTATIN 20 MILLIGRAM(S): 20 TABLET, FILM COATED ORAL at 21:30

## 2023-02-04 RX ADMIN — Medication 1: at 00:26

## 2023-02-04 NOTE — PROGRESS NOTE ADULT - ASSESSMENT
matta as per icu     Patient is a 89 yo F, from home, with PMHx HTN, COPD, Afib, HFpEF (7/29/22 EF 55-60%, LVH, GIDD, Breast CA, and remote h/o LLE DVT p/w SOB and hypoxia to high 70s, admitted to medicine for AHRF 2/2 Acute diastolic CHF +/- COPD exacerbation and UTI (completed treatment). ICU consulted for concerns of AMS in the setting of Acute on chronic compensated respiratory acidosis requiring new trial of Bipap.    ASSESSMENT and PLAN  - Acute on chronic respiratory acidosis in the setting of COPD  - Acute encephalopathy likely 2/2 Hypercapnia  - Acute diastolic heart failure  - Klebsiella UTI (completed Rx)  - HLD    Neuro:  #Acute encephalopathy  Pt AAO x 1, lethargic, responsive only to painful stimuli  Baseline AAO x 2, follows commands. Likely in the setting of Acute on chronic respiratory acidosis  Will start Bipap trial    Cardiovascular:  #Acute diastolic heart failure  ECHO July 2022- EF 55-60%, LVH, GIDD, mild pulm HTN  pro-BNP 3916 (prev 1943)  Continued on Lasix 20 mg daily IV (received dose 2/3, will hold for now as patient not clinically overloaded now)  Home meds: Coreg 6.125 mg BID, allergic to Losartan (angioedema), Lasix 40 mg BID  EKG showing Afib, rate controlled  Dr. Siu following    #Afib  Rate controlled, continue Coreg and Eliquis    Pulmonary:   #Acute on chronic respiratory acidosis  Admitted for Acute diastolic heart failure with IV Lasix 40 mg daily  COPD treated with PO Prednisone 40 mg x 5 days (converted to solumedrol IV 40mg daily)  Pt improved with IV Lasix daily> downtitrated to 20 mg daily, was given 1 dose of Acetazolamide 250mg BID Feb 2 due to concern for metabolic alkalosis (acute on chronic)  Pt clinically improving fluid status wise, hence will hold IV lasix (received 2/3 dose), currently lethargy more in-line with respiratory acidosis with COPD  Will start Bipap trial and maintain O2 sats 88-92% given COPD hx, prevent over oxygenation    Infectious Diseases:  #Klebsiella UTI  Completed 3 days of Rocephin    Gastrointestinal:  #NPO except meds given low threshold for intubation    Renal:  #Intermittent hyperkalemia  No hx of kidney disease, multiple samples were hemolyzed, pt was given 2 doses of Lokelma during hospitalization  Allergic to losartan hence no interference of ACEI/ARBS contributing to hyperkalemia  Pt has been on Lasix IV daily, hyperkalemic intermittently despite  Monitor for now    Endo:  #DM  A1c 7.6 Feb 2023, not on any medications  Continue Insulin sliding scale Q6 hours while NPO    #HLD  Hold simvastatin due to NPO low threshold for intubation    Heme/onc:   #Breast cancer  On Anastrozole daily    Skin/ catheter: Peripheral line, [place]    Prophylaxis: Eliquis for AC    Goals of Care: Full code    Dispo: Monitor in ICU for new Bipap

## 2023-02-04 NOTE — CONSULT NOTE ADULT - ASSESSMENT
89 yo F, from home, PMHx , COPD, Afib, HFpEF (7/29/22 EF 55-60%) Breast CA, remote h/o LLE DVT p/w SOB and hypoxia to high 70s, admitted to medicine for AHRF 2/2 Acute diastolic CHF +/- COPD exacerbation and UTI. ICU consulted 2/3 for concerns of AMS in the setting of Acute on chronic compensated respiratory acidosis requiring trial of Bipap. Palliative care consulted for GOC, ACP in setting of advanced age, multiple comorbidities and ICU consult.  89 yo F, from home, PMHx , COPD, Afib, HFpEF (7/29/22 EF 55-60%) Breast CA, remote h/o LLE DVT p/w SOB and hypoxia to high 70s, admitted to medicine for AHRF 2/2 Acute diastolic CHF +/- COPD exacerbation and UTI. ICU consulted 2/3 for concerns of AMS. Palliative care consulted for GOC, ACP in setting of advanced age, multiple comorbidities and ICU consult.

## 2023-02-04 NOTE — CONSULT NOTE ADULT - PROBLEM SELECTOR RECOMMENDATION 2
Bronchodilators  Steroids  Spiriva  PFTs as OP
.  2/2 Acute on chronic compensated respiratory acidosis requiring trial of Bipap overnight. oriented x1 this morning, minimally verbal. baseline oriented x2 and able to follow commands   - delirium precautions  - bipap trial per primary team

## 2023-02-04 NOTE — PROGRESS NOTE ADULT - SUBJECTIVE AND OBJECTIVE BOX
Patient is seen and examined at the bed side, is afebrile. She is doing better.       REVIEW OF SYSTEMS: All other review systems are negative      ALLERGIES: Chicken (Stomach Upset)  losartan (Angioedema)      ICU Vital Signs Last 24 Hrs  T(C): 36.6 (2023 12:00), Max: 36.6 (2023 20:00)  T(F): 97.9 (2023 12:00), Max: 97.9 (2023 20:00)  HR: 80 (2023 14:00) (61 - 91)  BP: 127/73 (2023 13:00) (102/80 - 162/99)  BP(mean): 86 (2023 13:00) (70 - 110)  ABP: --  ABP(mean): --  RR: 18 (2023 14:00) (12 - 21)  SpO2: 98% (2023 14:00) (97% - 100%)    O2 Parameters below as of 2023 12:00    O2 Flow (L/min): 2        PHYSICAL EXAM:  GENERAL: Not in distress, on   CHEST/LUNG:  Not using accessory muscles   HEART: s1 and s2 present  ABDOMEN:  Nontender and  Nondistended  EXTREMITIES: B/L  pedal  edema      LABS:                        14.7   5.58  )-----------( 253      ( 2023 03:48 )             48.7                           12.8   7.48  )-----------( 276      ( 2023 06:10 )             43.7         -    135  |  99  |  20<H>  ----------------------------<  174<H>  4.5   |  33<H>  |  0.82    Ca    9.4      2023 05:25  Phos  3.4     02-04  Mg     2.6     -04    TPro  6.8  /  Alb  2.9<L>  /  TBili  0.8  /  DBili  x   /  AST  14  /  ALT  26  /  AlkPhos  106  02-04    02-03    140  |  96  |  19<H>  ----------------------------<  127<H>  5.0   |  40<H>  |  0.88    Ca    8.9      2023 06:10  Phos  2.6     02-03  Mg     2.6     02-03      TPro  6.9  /  Alb  3.2<L>  /  TBili  0.5  /  DBili  x   /  AST  23  /  ALT  25  /  AlkPhos  102  02-01    PT/INR - ( 2023 18:00 )   PT: 23.8 sec;   INR: 1.99 ratio      PTT - ( 2023 18:00 )  PTT:34.5 sec      CAPILLARY BLOOD GLUCOSE  POCT Blood Glucose.: 250 mg/dL (2023 01:11)  POCT Blood Glucose.: 176 mg/dL (2023 23:47)  POCT Blood Glucose.: 176 mg/dL (2023 16:31)        Urinalysis Basic - ( 2023 21:30 )  Color: Yellow / Appearance: very cloudy / S.010 / pH: x  Gluc: x / Ketone: Negative  / Bili: Negative / Urobili: Negative   Blood: x / Protein: 30 mg/dL / Nitrite: Negative   Leuk Esterase: Moderate / RBC: 25-50 /HPF / WBC >50 /HPF   Sq Epi: x / Non Sq Epi: Few /HPF / Bacteria: Moderate /HPF        MEDICATIONS  (STANDING):    albuterol/ipratropium for Nebulization 3 milliLiter(s) Nebulizer every 6 hours  anastrozole 1 milliGRAM(s) Oral daily  apixaban 5 milliGRAM(s) Oral every 12 hours  artificial  tears Solution 1 Drop(s) Both EYES two times a day  carvedilol 6.25 milliGRAM(s) Oral every 12 hours  chlorhexidine 2% Cloths 1 Application(s) Topical <User Schedule>  insulin lispro (ADMELOG) corrective regimen sliding scale   SubCutaneous every 6 hours  pantoprazole  Injectable 40 milliGRAM(s) IV Push daily  simvastatin 20 milliGRAM(s) Oral at bedtime        RADIOLOGY & ADDITIONAL TESTS:    23: Xray Chest 1 View- PORTABLE-Urgent (23 @ 22:00) There are increased interstitial markings which may represent the   patient's underlying COPD or pulmonary venous congestion. No focal  consolidation or gross effusion.        MICROBIOLOGY DATA:    Culture - Urine (23 @ 21:30)   Specimen Source: Clean Catch Clean Catch (Midstream)   Culture Results:   >100,000 CFU/ml Klebsiella aerogenes (Previously Enterobacter)     Culture - Blood (23 @ 18:00)   Specimen Source: .Blood Blood-Peripheral   Culture Results: No growth to date.     Culture - Blood (23 @ 17:50)   Specimen Source: .Blood Blood-Peripheral   Culture Results: No growth to date.     Flu With COVID-19 By PATRICK (23 @ 18:00)   SARS-CoV-2 Result: NotDetec             Patient is seen and examined at the bed side, is afebrile. She is doing better, awake , alert and on oxygen via NC.  She has transferred out of ICU.      REVIEW OF SYSTEMS: All other review systems are negative      ALLERGIES: Chicken (Stomach Upset)  losartan (Angioedema)      ICU Vital Signs Last 24 Hrs  T(C): 36.6 (2023 12:00), Max: 36.6 (2023 20:00)  T(F): 97.9 (2023 12:00), Max: 97.9 (2023 20:00)  HR: 80 (2023 14:00) (61 - 91)  BP: 127/73 (2023 13:00) (102/80 - 162/99)  BP(mean): 86 (2023 13:00) (70 - 110)  ABP: --  ABP(mean): --  RR: 18 (2023 14:00) (12 - 21)  SpO2: 98% (2023 14:00) (97% - 100%)    O2 Parameters below as of 2023 12:00    O2 Flow (L/min): 2        PHYSICAL EXAM:  GENERAL: Not in distress, on oxygen via  NC  CHEST/LUNG:  Not using accessory muscles   HEART: s1 and s2 present  ABDOMEN:  Nontender and  Nondistended  EXTREMITIES: B/L  pedal  edema  CNS: awake and alert      LABS:                        14.7   5.58  )-----------( 253      ( 2023 03:48 )             48.7                           12.8   7.48  )-----------( 276      ( 2023 06:10 )             43.7         02-04    135  |  99  |  20<H>  ----------------------------<  174<H>  4.5   |  33<H>  |  0.82    Ca    9.4      2023 05:25  Phos  3.4     02-04  Mg     2.6     02-04    TPro  6.8  /  Alb  2.9<L>  /  TBili  0.8  /  DBili  x   /  AST  14  /  ALT  26  /  AlkPhos  106  02-04    02-03    140  |  96  |  19<H>  ----------------------------<  127<H>  5.0   |  40<H>  |  0.88    Ca    8.9      2023 06:10  Phos  2.6     02-  Mg     2.6     -      TPro  6.9  /  Alb  3.2<L>  /  TBili  0.5  /  DBili  x   /  AST  23  /  ALT  25  /  AlkPhos  102  02-    PT/INR - ( 2023 18:00 )   PT: 23.8 sec;   INR: 1.99 ratio      PTT - ( 2023 18:00 )  PTT:34.5 sec      CAPILLARY BLOOD GLUCOSE  POCT Blood Glucose.: 250 mg/dL (2023 01:11)  POCT Blood Glucose.: 176 mg/dL (2023 23:47)  POCT Blood Glucose.: 176 mg/dL (2023 16:31)        Urinalysis Basic - ( 2023 21:30 )  Color: Yellow / Appearance: very cloudy / S.010 / pH: x  Gluc: x / Ketone: Negative  / Bili: Negative / Urobili: Negative   Blood: x / Protein: 30 mg/dL / Nitrite: Negative   Leuk Esterase: Moderate / RBC: 25-50 /HPF / WBC >50 /HPF   Sq Epi: x / Non Sq Epi: Few /HPF / Bacteria: Moderate /HPF        MEDICATIONS  (STANDING):    albuterol/ipratropium for Nebulization 3 milliLiter(s) Nebulizer every 6 hours  anastrozole 1 milliGRAM(s) Oral daily  apixaban 5 milliGRAM(s) Oral every 12 hours  artificial  tears Solution 1 Drop(s) Both EYES two times a day  carvedilol 6.25 milliGRAM(s) Oral every 12 hours  chlorhexidine 2% Cloths 1 Application(s) Topical <User Schedule>  insulin lispro (ADMELOG) corrective regimen sliding scale   SubCutaneous every 6 hours  pantoprazole  Injectable 40 milliGRAM(s) IV Push daily  simvastatin 20 milliGRAM(s) Oral at bedtime        RADIOLOGY & ADDITIONAL TESTS:    23: Xray Chest 1 View- PORTABLE-Urgent (23 @ 22:00) There are increased interstitial markings which may represent the   patient's underlying COPD or pulmonary venous congestion. No focal  consolidation or gross effusion.        MICROBIOLOGY DATA:    Culture - Urine (23 @ 21:30)   Specimen Source: Clean Catch Clean Catch (Midstream)   Culture Results:   >100,000 CFU/ml Klebsiella aerogenes (Previously Enterobacter)     Culture - Blood (23 @ 18:00)   Specimen Source: .Blood Blood-Peripheral   Culture Results: No growth to date.     Culture - Blood (23 @ 17:50)   Specimen Source: .Blood Blood-Peripheral   Culture Results: No growth to date.     Flu With COVID-19 By PATRICK (23 @ 18:00)   SARS-CoV-2 Result: NotDetec

## 2023-02-04 NOTE — CONSULT NOTE ADULT - PROBLEM SELECTOR RECOMMENDATION 7
lipid panel  statin
.  Complex medical decision making / symptom management in the setting of advanced age and chronic conditions     Coping:  well[  ] with difficulty[  ] poor coping[  ] unable to assess[ x ]   Support system: strong[  x] adequate[  ] inadequate[  ]    - Will continue to follow for ongoing GOC discussion. Emotional support provided, questions answered.

## 2023-02-04 NOTE — PROGRESS NOTE ADULT - ASSESSMENT
89 year old Female, from home, ambulates with RW, with PMHx HTN, COPD, Afib, HFpEF (7/29/22 EF 55-60%, LVH, GIDD, mild pulmonary HTN), Breast CA, and remote h/o LLE DVT presents with cough and shortness of breath for a weeks duration,acute diastolic HF,acute on chronic resp failure with CO2 retention..  1.CO2 retention-s/p Bipap.  2.Afib-eliquis,coreg.  3.COPD-Pulm f/u,MDI,steroids.  4.HTN-coreg.  5.PPI.  6.Sleep study-r/o giana.

## 2023-02-04 NOTE — PROGRESS NOTE ADULT - SUBJECTIVE AND OBJECTIVE BOX
Patient is a 89y old  Female who presents with a chief complaint of AHRF (04 Feb 2023 10:47)  Lethargic but arousable, improved after Bi-pap trial. Currently on oxygen supp via NC. Transferred to ICU for hypercapnia and Bi-pap therapy    INTERVAL HPI/OVERNIGHT EVENTS:      VITAL SIGNS:  T(F): 97.9 (02-04-23 @ 12:00)  HR: 91 (02-04-23 @ 12:00)  BP: 145/68 (02-04-23 @ 12:00)  RR: 17 (02-04-23 @ 12:00)  SpO2: 99% (02-04-23 @ 12:00)  Wt(kg): --  I&O's Detail    03 Feb 2023 07:01  -  04 Feb 2023 07:00  --------------------------------------------------------  IN:  Total IN: 0 mL    OUT:    Indwelling Catheter - Urethral (mL): 1090 mL  Total OUT: 1090 mL    Total NET: -1090 mL      04 Feb 2023 07:01  -  04 Feb 2023 12:08  --------------------------------------------------------  IN:    Oral Fluid: 360 mL  Total IN: 360 mL    OUT:    Indwelling Catheter - Urethral (mL): 150 mL  Total OUT: 150 mL    Total NET: 210 mL              REVIEW OF SYSTEMS:    CONSTITUTIONAL:  No fevers, chills, sweats    HEENT:  Eyes:  No diplopia or blurred vision. ENT:  No earache, sore throat or runny nose.    CARDIOVASCULAR:  No pressure, squeezing, tightness, or heaviness about the chest; no palpitations.    RESPIRATORY:  Per HPI    GASTROINTESTINAL:  No abdominal pain, nausea, vomiting or diarrhea.    GENITOURINARY:  No dysuria, frequency or urgency.    NEUROLOGIC:  No paresthesias, fasciculations, seizures or weakness.    PSYCHIATRIC:  No disorder of thought or mood.      PHYSICAL EXAM:    Constitutional: Well developed and nourished  Eyes:Perrla  ENMT: normal  Neck:supple  Respiratory: good air entry  Cardiovascular: S1 S2 regular  Gastrointestinal: Soft, Non tender  Extremities: No edema  Vascular:normal  Neurological:Awake, alert,Ox3  Musculoskeletal:Normal      MEDICATIONS  (STANDING):  albuterol/ipratropium for Nebulization 3 milliLiter(s) Nebulizer every 6 hours  anastrozole 1 milliGRAM(s) Oral daily  apixaban 5 milliGRAM(s) Oral every 12 hours  artificial  tears Solution 1 Drop(s) Both EYES two times a day  carvedilol 6.25 milliGRAM(s) Oral every 12 hours  chlorhexidine 2% Cloths 1 Application(s) Topical <User Schedule>  insulin lispro (ADMELOG) corrective regimen sliding scale   SubCutaneous every 6 hours  pantoprazole  Injectable 40 milliGRAM(s) IV Push daily    MEDICATIONS  (PRN):      Allergies    losartan (Angioedema)    Intolerances    Chicken (Stomach Upset)      LABS:                        14.7   5.58  )-----------( 253      ( 04 Feb 2023 03:48 )             48.7     02-04    135  |  99  |  20<H>  ----------------------------<  174<H>  4.5   |  33<H>  |  0.82    Ca    9.4      04 Feb 2023 05:25  Phos  3.4     02-04  Mg     2.6     02-04    TPro  6.8  /  Alb  2.9<L>  /  TBili  0.8  /  DBili  x   /  AST  14  /  ALT  26  /  AlkPhos  106  02-04        ABG - ( 04 Feb 2023 03:34 )  pH, Arterial: 7.36  pH, Blood: x     /  pCO2: 75    /  pO2: 111   / HCO3: 42    / Base Excess: 13.4  /  SaO2: 99                    CAPILLARY BLOOD GLUCOSE      POCT Blood Glucose.: 167 mg/dL (04 Feb 2023 10:59)  POCT Blood Glucose.: 164 mg/dL (04 Feb 2023 06:13)  POCT Blood Glucose.: 163 mg/dL (04 Feb 2023 00:23)  POCT Blood Glucose.: 180 mg/dL (03 Feb 2023 16:38)    pro-bnp 3916 01-30 @ 20:20     d-dimer --  01-30 @ 20:20      RADIOLOGY & ADDITIONAL TESTS:    CXR:  < from: Xray Chest 1 View- PORTABLE-Urgent (01.30.23 @ 22:00) >  Impression:    There are increased interstitial markings which may represent the   patient's underlying COPD or pulmonary venous congestion. No focal   consolidation or gross effusion.      < end of copied text >    Ct scan chest:    ekg;    echo:

## 2023-02-04 NOTE — PROGRESS NOTE ADULT - PROBLEM SELECTOR PLAN 6
ECHO July 2022- EF 55-60%, LVH, GIDD, mild pulm HTN  pro-BNP 3916 (prev 1943)  S/p diuresis in ICU  Continued on Lasix 20 mg daily IV  Patient on  Coreg 6.125 mg BID and Lasix 40 mg BID at home.   Dr. Siu following

## 2023-02-04 NOTE — PROGRESS NOTE ADULT - SUBJECTIVE AND OBJECTIVE BOX
Patient was seen and examined  Patient is a 89y old  Female who presents with a chief complaint of AHRF (04 Feb 2023 10:47)      INTERVAL HPI/OVERNIGHT EVENTS:  T(C): 36.6 (02-04-23 @ 12:00), Max: 36.8 (02-03-23 @ 12:15)  HR: 91 (02-04-23 @ 12:00) (61 - 91)  BP: 145/68 (02-04-23 @ 12:00) (102/80 - 162/99)  RR: 17 (02-04-23 @ 12:00) (12 - 21)  SpO2: 99% (02-04-23 @ 12:00) (98% - 100%)  Wt(kg): --  I&O's Summary    03 Feb 2023 07:01  -  04 Feb 2023 07:00  --------------------------------------------------------  IN: 0 mL / OUT: 1090 mL / NET: -1090 mL    04 Feb 2023 07:01  -  04 Feb 2023 12:14  --------------------------------------------------------  IN: 360 mL / OUT: 150 mL / NET: 210 mL        LABS:                        14.7   5.58  )-----------( 253      ( 04 Feb 2023 03:48 )             48.7     02-04    135  |  99  |  20<H>  ----------------------------<  174<H>  4.5   |  33<H>  |  0.82    Ca    9.4      04 Feb 2023 05:25  Phos  3.4     02-04  Mg     2.6     02-04    TPro  6.8  /  Alb  2.9<L>  /  TBili  0.8  /  DBili  x   /  AST  14  /  ALT  26  /  AlkPhos  106  02-04        CAPILLARY BLOOD GLUCOSE      POCT Blood Glucose.: 167 mg/dL (04 Feb 2023 10:59)  POCT Blood Glucose.: 164 mg/dL (04 Feb 2023 06:13)  POCT Blood Glucose.: 163 mg/dL (04 Feb 2023 00:23)  POCT Blood Glucose.: 180 mg/dL (03 Feb 2023 16:38)        ABG - ( 04 Feb 2023 03:34 )  pH, Arterial: 7.36  pH, Blood: x     /  pCO2: 75    /  pO2: 111   / HCO3: 42    / Base Excess: 13.4  /  SaO2: 99                    MEDICATIONS  (STANDING):  albuterol/ipratropium for Nebulization 3 milliLiter(s) Nebulizer every 6 hours  anastrozole 1 milliGRAM(s) Oral daily  apixaban 5 milliGRAM(s) Oral every 12 hours  artificial  tears Solution 1 Drop(s) Both EYES two times a day  carvedilol 6.25 milliGRAM(s) Oral every 12 hours  chlorhexidine 2% Cloths 1 Application(s) Topical <User Schedule>  insulin lispro (ADMELOG) corrective regimen sliding scale   SubCutaneous every 6 hours  pantoprazole  Injectable 40 milliGRAM(s) IV Push daily    MEDICATIONS  (PRN):      RADIOLOGY & ADDITIONAL TESTS:    Imaging Personally Reviewed:  [ ] YES  [ ] NO    REVIEW OF SYSTEMS:  nad     Consultant(s) Notes Reviewed:  [ x ] YES  [ ] NO    PHYSICAL EXAM:  GENERAL: NAD, well-groomed, well-developed  HEAD:  Atraumatic, Normocephalic  ENMT: No tonsillar erythema, exudates, or enlargement; Moist mucous membranes, Good dentition, No lesions  NECK: Supple, No JVD, Normal thyroid  NERVOUS SYSTEM:  aaox 1   CHEST/LUNG: bl rhonchi   HEART: Regular rate and rhythm; No murmurs, rubs, or gallops  ABDOMEN: Soft, Nontender, Nondistended; Bowel sounds present  EXTREMITIES:  edema  LYMPH: No lymphadenopathy noted  SKIN: No rashes or lesions    Care Discussed with Consultants/Other Providers [ x] YES  [ ] NO

## 2023-02-04 NOTE — CONSULT NOTE ADULT - PROBLEM SELECTOR RECOMMENDATION 9
likely secondary to CHF vs Copd  oxygen supp  monitor oxygen sat  monitor resp status
.  multiple chronic conditions, hospital course cb AMS and ICU cs. pps 30%  - cw supportive care, encourage PO, movement as halina  - PT eval when medically appropriate  - Ingrid not within family GOC, prefer her DC'd home and resume HHA

## 2023-02-04 NOTE — CONSULT NOTE ADULT - PROBLEM SELECTOR RECOMMENDATION 4
ocnt meds  Cardio eval
.  EF 55% 7/2022. recurrent hospitalizations.   - does not meet hospice criteria  - poor candidate for advanced therapies given functional and cognitive statuses and other comorbidities

## 2023-02-04 NOTE — CONSULT NOTE ADULT - SUBJECTIVE AND OBJECTIVE BOX
Consult to: Discuss complex medical decision making related to goals of care    [facility] Consult Service:  Dr. Danielle Monae: cell ([phone])  Dr. Emiliana Driscoll: cell ([phone])   Maria Antonia Nobles NP: cell ([phone])  Sara Markham NP: cell ([phone])   Adam Reich LSW: cell ([phone])     HPI:  89 year old Female, from home, ambulates with RW, with PMHx HTN, COPD, Afib, HFpEF (7/29/22 EF 55-60%, LVH, GIDD, mild pulmonary HTN), Breast CA, and remote h/o LLE DVT presents with cough and shortness of breath for a weeks duration. Per granddaughters at bedside, pt has had dry cough for over a week which became productiev and associated with wheezing over the last few days. Pts O2 saturation has b een fluctuating in the 80s over the past week and this morning was in the high 70s, per granddaughters.  Pt was taken to PCP Dr. Bianchi's office who sent her to the ED for admission. The patient denies any other symptoms including fever, chills ,headache, dizziness, chest pain, n/v/d though has occasional constipation. Also denies urinary complaints or leg swelling. Pt is allergic to Losartan (angioedema).    (30 Jan 2023 21:22)      PAST MEDICAL & SURGICAL HISTORY:  Arthritis  Legally blind  Pre-diabetes  Breast cancer  right, no chemo or radiation  COPD exacerbation  Atrial fibrillation  HF (heart failure)  HFpEF 7/29  HTN (hypertension)  Deep vein thrombosis (DVT)  Left Lower Extremity  H/O CHF    No significant past surgical history    SOCIAL HISTORY:    Admitted from:  home  (with HHA)          [ none ] Substance abuse, [ none ] Tobacco hx, [ none ] Alcohol hx, [ none ] Home Opioid Hx    FAMILY HISTORY:  FH: HTN (hypertension)     unable to obtain from patient due to poor mentation, see H&P for history    Baseline ADLs (prior to admission): moderate assistance, pts daughter Toña, is caregiver   - Toña is CDPAP 6hrs a day from 6:30-9:30pm from [facility]      Allergies  losartan (Angioedema)  Intolerances  Chicken (Stomach Upset)    Present Symptoms: limited dt pt lethargy/mentation, collateral obtained from pts daughter, Toña, at bedside  Pain: no nonverbal ss of distress or discomfort   Fatigue: moderate  Nausea: none  Lack of Appetite: good, no recent changes   SOB: comfortable on NC this morning   Depression: none  Anxiety: none  Review of Systems: [All others negative]    MEDICATIONS  (STANDING):  albuterol/ipratropium for Nebulization 3 milliLiter(s) Nebulizer every 6 hours  anastrozole 1 milliGRAM(s) Oral daily  apixaban 5 milliGRAM(s) Oral every 12 hours  artificial  tears Solution 1 Drop(s) Both EYES two times a day  carvedilol 6.25 milliGRAM(s) Oral every 12 hours  chlorhexidine 2% Cloths 1 Application(s) Topical <User Schedule>  insulin lispro (ADMELOG) corrective regimen sliding scale   SubCutaneous every 6 hours  pantoprazole  Injectable 40 milliGRAM(s) IV Push daily    MEDICATIONS  (PRN):      PHYSICAL EXAM:  Vital Signs Last 24 Hrs  T(C): 36.6 (04 Feb 2023 12:00), Max: 36.8 (03 Feb 2023 12:15)  T(F): 97.9 (04 Feb 2023 12:00), Max: 98.2 (03 Feb 2023 12:15)  HR: 91 (04 Feb 2023 12:00) (61 - 91)  BP: 145/68 (04 Feb 2023 12:00) (102/80 - 162/99)  BP(mean): 84 (04 Feb 2023 12:00) (70 - 110)  RR: 17 (04 Feb 2023 12:00) (12 - 21)  SpO2: 99% (04 Feb 2023 12:00) (98% - 100%)    Parameters below as of 04 Feb 2023 12:00    O2 Flow (L/min): 2    General: elderly woman lying in bed, lethargic oriented x1  verbal      Palliative Performance Scale/Karnofsky Score: 30  ECOG Performance: 3/4    HEENT: no abnormal lesion, dry mouth no temporal wasting   Lungs: No tachypnea/labored breathing, No audible excessive secretions  CV: RRR, S1S2, No tachycardia  GI: soft non distended non tender functionally incontinent  : long  Musculoskeletal: weakness x4 edema x4   chair bound  Skin: no abnormal skin lesions, poor skin turgor  Neuro: + cognitive impairment No dsyphagia/dysarthria paresis  Oral intake ability:  minimal capability    LABS:                        14.7   5.58  )-----------( 253      ( 04 Feb 2023 03:48 )             48.7     02-04    135  |  99  |  20<H>  ----------------------------<  174<H>  4.5   |  33<H>  |  0.82    Ca    9.4      04 Feb 2023 05:25  Phos  3.4     02-04  Mg     2.6     02-04    TPro  6.8  /  Alb  2.9<L>  /  TBili  0.8  /  DBili  x   /  AST  14  /  ALT  26  /  AlkPhos  106  02-04    RADIOLOGY & ADDITIONAL STUDIES:  < from: CT Chest No Cont (01.31.23 @ 16:36) >  IMPRESSION:.  No focal lung consolidation.  Bibasilar atelectasis.    < from: Xray Chest 1 View- PORTABLE-Urgent (01.30.23 @ 22:00) >  Impression:  There are increased interstitial markings which may represent the   patient's underlying COPD or pulmonary venous congestion. No focal   consolidation or gross effusion.      [facility] discussion:

## 2023-02-04 NOTE — PROGRESS NOTE ADULT - PROBLEM SELECTOR PLAN 1
likely 2/2 Hypercapnia  Improving with BiPAP.  Monitor mentation  BiPAP Q HS and as needed.   Avoid sedatives and narcotics.

## 2023-02-04 NOTE — PROGRESS NOTE ADULT - ASSESSMENT
Patient is a 89 yo F, from home, with PMHx HTN, COPD, Afib, HFpEF (7/29/22 EF 55-60%, LVH, GIDD, Breast CA, and remote h/o LLE DVT p/w SOB and hypoxia to high 70s, admitted to medicine for AHRF 2/2 Acute diastolic CHF +/- COPD exacerbation and UTI (completed treatment). ICU consulted for concerns of AMS in the setting of Acute on chronic compensated respiratory acidosis requiring new trial of Bipap.    ASSESSMENT and PLAN  - Acute on chronic respiratory acidosis in the setting of COPD  - Acute encephalopathy likely 2/2 Hypercapnia  - Acute diastolic heart failure  - Klebsiella UTI (completed Rx)  - HLD    Neuro:  #Acute encephalopathy  Pt AAO x 1, lethargic, responsive only to painful stimuli  Baseline AAO x 2, follows commands. Likely in the setting of Acute on chronic respiratory acidosis  Will start Bipap trial    Cardiovascular:  #Acute diastolic heart failure  ECHO July 2022- EF 55-60%, LVH, GIDD, mild pulm HTN  pro-BNP 3916 (prev 1943)  Continued on Lasix 20 mg daily IV (received dose 2/3, will hold for now as patient not clinically overloaded now)  Home meds: Coreg 6.125 mg BID, allergic to Losartan (angioedema), Lasix 40 mg BID  EKG showing Afib, rate controlled  Dr. Siu following    #Afib  Rate controlled, continue Coreg and Eliquis    Pulmonary:   #Acute on chronic respiratory acidosis  Admitted for Acute diastolic heart failure with IV Lasix 40 mg daily  COPD treated with PO Prednisone 40 mg x 5 days (converted to solumedrol IV 40mg daily)  Pt improved with IV Lasix daily> downtitrated to 20 mg daily, was given 1 dose of Acetazolamide 250mg BID Feb 2 due to concern for metabolic alkalosis (acute on chronic)  Pt clinically improving fluid status wise, hence will hold IV lasix (received 2/3 dose), currently lethargy more in-line with respiratory acidosis with COPD  Will start Bipap trial and maintain O2 sats 88-92% given COPD hx, prevent over oxygenation    Infectious Diseases:  #Klebsiella UTI  Completed 3 days of Rocephin    Gastrointestinal:  #NPO except meds given low threshold for intubation    Renal:  #Intermittent hyperkalemia  No hx of kidney disease, multiple samples were hemolyzed, pt was given 2 doses of Lokelma during hospitalization  Allergic to losartan hence no interference of ACEI/ARBS contributing to hyperkalemia  Pt has been on Lasix IV daily, hyperkalemic intermittently despite  Monitor for now    Endo:  #DM  A1c 7.6 Feb 2023, not on any medications  Continue Insulin sliding scale Q6 hours while NPO    #HLD  Hold simvastatin due to NPO low threshold for intubation    Heme/onc:   #Breast cancer  On Anastrozole daily    Skin/ catheter: Peripheral line, [place]    Prophylaxis: Eliquis for AC    Goals of Care: Full code    Dispo: Monitor in ICU for new Bipap Patient is a 89 yo F, from home, with PMHx HTN, COPD, Afib, HFpEF (7/29/22 EF 55-60%, LVH, GIDD, Breast CA, and remote h/o LLE DVT p/w SOB and hypoxia to high 70s, admitted to medicine for AHRF 2/2 Acute diastolic CHF +/- COPD exacerbation and UTI (completed treatment). ICU consulted for concerns of AMS in the setting of Acute on chronic compensated respiratory acidosis requiring new trial of Bipap.    ASSESSMENT and PLAN  - Acute on chronic respiratory acidosis in the setting of COPD  - Acute encephalopathy likely 2/2 Hypercapnia  - Acute diastolic heart failure  - Klebsiella UTI (completed Rx)  - HLD    Neuro:  #Acute encephalopathy  Pt AAO x 1, lethargic, responsive only to painful stimuli  Baseline AAO x 2, follows commands. Likely in the setting of Acute on chronic respiratory acidosis  Will start Bipap trial    Cardiovascular:  #Acute diastolic heart failure  ECHO July 2022- EF 55-60%, LVH, GIDD, mild pulm HTN  pro-BNP 3916 (prev 1943)  Continued on Lasix 20 mg daily IV (received dose 2/3, will hold for now as patient not clinically overloaded now)  Home meds: Coreg 6.125 mg BID, allergic to Losartan (angioedema), Lasix 40 mg BID  EKG showing Afib, rate controlled  Dr. Siu following    #Afib  Rate controlled, continue Coreg and Eliquis    Pulmonary:   #Acute on chronic respiratory acidosis  Admitted for Acute diastolic heart failure with IV Lasix 40 mg daily  COPD treated with PO Prednisone 40 mg x 5 days (converted to solumedrol IV 40mg daily)  Pt improved with IV Lasix daily> downtitrated to 20 mg daily, was given 1 dose of Acetazolamide 250mg BID Feb 2 due to concern for metabolic alkalosis (acute on chronic)  Pt clinically improving fluid status wise, hence will hold IV lasix (received 2/3 dose), currently lethargy more in-line with respiratory acidosis with COPD  Had improvement in respiratory acidosis with Bipap/AVAPS trial, transitioned back to NC on 2/4.   Continue NIPPV QHS.     Infectious Diseases:  #Klebsiella UTI  Completed 3 days of Rocephin    Gastrointestinal:  #Nutrition  Bedside S+S, advance diet as tolerated and per respitaory status.     Renal:  #Intermittent hyperkalemia  No hx of kidney disease, multiple samples were hemolyzed, pt was given 2 doses of Lokelma during hospitalization  Allergic to losartan hence no interference of ACEI/ARBS contributing to hyperkalemia  Hold lasix for now.     Endo:  #DM  A1c 7.6 Feb 2023, not on any medications  Continue Insulin sliding scale Q6 hours while NPO    #HLD  Simvastatin.     Heme/onc:   #Breast cancer  On Anastrozole daily    Skin/ catheter: Peripheral line, [place]ley    Prophylaxis: Eliquis for AC    Goals of Care: Full code    Dispo: Transfer to .

## 2023-02-04 NOTE — PROGRESS NOTE ADULT - SUBJECTIVE AND OBJECTIVE BOX
CHIEF COMPLAINT:Patient is a 89y old  Female who presents with a chief complaint of AHRF.Pt now off Bipap.    	  REVIEW OF SYSTEMS:  CONSTITUTIONAL: No fever, weight loss, or fatigue  EYES: No eye pain, visual disturbances, or discharge  ENT:  No difficulty hearing, tinnitus, vertigo; No sinus or throat pain  NECK: No pain or stiffness  RESPIRATORY: No cough, wheezing, chills or hemoptysis; No Shortness of Breath  CARDIOVASCULAR: No chest pain, palpitations, passing out, dizziness, or leg swelling  GASTROINTESTINAL: No abdominal or epigastric pain. No nausea, vomiting, or hematemesis; No diarrhea or constipation. No melena or hematochezia.  GENITOURINARY: No dysuria, frequency, hematuria, or incontinence  NEUROLOGICAL: No headaches, memory loss, loss of strength, numbness, or tremors  SKIN: No itching, burning, rashes, or lesions   LYMPH Nodes: No enlarged glands  ENDOCRINE: No heat or cold intolerance; No hair loss  MUSCULOSKELETAL: No joint pain or swelling; No muscle, back, or extremity pain  PSYCHIATRIC: No depression, anxiety, mood swings, or difficulty sleeping  HEME/LYMPH: No easy bruising, or bleeding gums  ALLERGY AND IMMUNOLOGIC: No hives or eczema	      PHYSICAL EXAM:  T(C): 36.2 (02-04-23 @ 08:00), Max: 36.8 (02-03-23 @ 12:15)  HR: 61 (02-04-23 @ 09:00) (61 - 81)  BP: 124/69 (02-04-23 @ 09:00) (102/80 - 162/99)  RR: 12 (02-04-23 @ 09:00) (12 - 21)  SpO2: 100% (02-04-23 @ 09:00) (98% - 100%)  Wt(kg): --  I&O's Summary    03 Feb 2023 07:01  -  04 Feb 2023 07:00  --------------------------------------------------------  IN: 0 mL / OUT: 1090 mL / NET: -1090 mL    04 Feb 2023 07:01  -  04 Feb 2023 10:48  --------------------------------------------------------  IN: 0 mL / OUT: 65 mL / NET: -65 mL        Appearance: Normal	  HEENT:   Normal oral mucosa, PERRL, EOMI	  Lymphatic: No lymphadenopathy  Cardiovascular: Normal S1 S2, No JVD, No murmurs, No edema  Respiratory: Lungs clear to auscultation	  Psychiatry: A & O x 3, Mood & affect appropriate  Gastrointestinal:  Soft, Non-tender, + BS	  Skin: No rashes, No ecchymoses, No cyanosis	  Neurologic: Non-focal  Extremities: Normal range of motion, No clubbing, cyanosis or edema  Vascular: Peripheral pulses palpable 2+ bilaterally    MEDICATIONS  (STANDING):  albuterol/ipratropium for Nebulization 3 milliLiter(s) Nebulizer every 6 hours  anastrozole 1 milliGRAM(s) Oral daily  artificial  tears Solution 1 Drop(s) Both EYES two times a day  carvedilol 6.25 milliGRAM(s) Oral every 12 hours  chlorhexidine 2% Cloths 1 Application(s) Topical <User Schedule>  enoxaparin Injectable 120 milliGRAM(s) SubCutaneous every 12 hours  insulin lispro (ADMELOG) corrective regimen sliding scale   SubCutaneous every 6 hours  methylPREDNISolone sodium succinate Injectable 40 milliGRAM(s) IV Push every 6 hours  pantoprazole  Injectable 40 milliGRAM(s) IV Push daily        	  LABS:	 	                        14.7   5.58  )-----------( 253      ( 04 Feb 2023 03:48 )             48.7     02-04    135  |  99  |  20<H>  ----------------------------<  174<H>  4.5   |  33<H>  |  0.82    Ca    9.4      04 Feb 2023 05:25  Phos  3.4     02-04  Mg     2.6     02-04    TPro  6.8  /  Alb  2.9<L>  /  TBili  0.8  /  DBili  x   /  AST  14  /  ALT  26  /  AlkPhos  106  02-04    proBNP: Serum Pro-Brain Natriuretic Peptide: 3916 pg/mL (01-30 @ 20:20)  Serum Pro-Brain Natriuretic Peptide: 1943 pg/mL (01-11 @ 16:58)

## 2023-02-04 NOTE — PROGRESS NOTE ADULT - ASSESSMENT
Patient is a 89y old  Female from home, ambulates with RW, with PMHx HTN, COPD, Afib, HFpEF (7/29/22 EF 55-60%, LVH, GIDD, mild pulmonary HTN), Breast CA, and remote h/o LLE DVT, now presents to the ER for evaluation of cough and shortness of breath. She has had dry cough for over a week which became productive and associated with wheezing over the last few days. Pts O2 saturation has been fluctuating in the 80s over the past week and this morning was in the high 70s, per granddaughters.  Pt was taken to PCP Dr. Bianchi's office who sent her to the ER for admission.. On admission, she has no fever, no Leukocytosis, but found to have positive Urine analysis and negative CXR. She has started on Ceftriaxone and The ID consult requested to assist with further evaluation and antibiotic management.    # Metabolic encephalopathy - resolving   # UTI  - Urine Cx grew Klebsiella- s/p completed the course of Ceftriaxone   # Hypoxia- on supplemental oxygenation  # Metabolic encephalopathy  # Hypercapnic Respiratory failure- on BIPAP- in ICU - 2/3/23    would recommend:    1. Management orf BIPAP as per ICU protocol   2. Aspiration precaution  3. Diuresis as per Primary team   4. Supplemental oxygenation and bronchodilator as needed  5. Keep Both LE elevated    d/w ICU team and Covering NP, Ray    Attending Attestation:    Spent more than 35 minutes on total encounter, more than 50 % of the visit was spent counseling and/or coordinating care by the Attending physician.  '  Complexity:    # Metabolic encephalopathy   # UTI  # Hypoxia- on supplemental oxygenation Patient is a 89y old  Female from home, ambulates with RW, with PMHx HTN, COPD, Afib, HFpEF (7/29/22 EF 55-60%, LVH, GIDD, mild pulmonary HTN), Breast CA, and remote h/o LLE DVT, now presents to the ER for evaluation of cough and shortness of breath. She has had dry cough for over a week which became productive and associated with wheezing over the last few days. Pts O2 saturation has been fluctuating in the 80s over the past week and this morning was in the high 70s, per granddaughters.  Pt was taken to PCP Dr. Bianchi's office who sent her to the ER for admission.. On admission, she has no fever, no Leukocytosis, but found to have positive Urine analysis and negative CXR. She has started on Ceftriaxone and The ID consult requested to assist with further evaluation and antibiotic management.    # Metabolic encephalopathy - resolved  # UTI  - Urine Cx grew Klebsiella- s/p completed the course of Ceftriaxone   # Hypoxia- on supplemental oxygenation  # Metabolic encephalopathy  # Hypercapnic Respiratory failure- on BIPAP- in ICU - 2/3/23- transferred out of ICU 2/4/23    would recommend:    1. PT/OOB to chair   2. Management orf BIPAP as per  protocol   3. Diuresis as per Primary team   4. Supplemental oxygenation and bronchodilator as needed  5. Keep Both LE elevated      Attending Attestation:    Spent more than 35 minutes on total encounter, more than 50 % of the visit was spent counseling and/or coordinating care by the Attending physician.  '  Complexity:    # Metabolic encephalopathy - resolved  # UTI  # Hypoxia- on supplemental oxygenation

## 2023-02-04 NOTE — PROGRESS NOTE ADULT - SUBJECTIVE AND OBJECTIVE BOX
NUNO LAST    SCU progress note    INTERVAL HPI/OVERNIGHT EVENTS: ***    DNR [ ]   DNI  [  ]    Covid - 19 PCR:     The 4Ms    What Matters Most: see GOC  Age appropriate Medications/Screen for High Risk Medication: Yes  Mentation: see CAM below  Mobility: defer to physical exam    The Confusion Assessment Method (CAM) Diagnostic Algorithm     1: Acute Onset or Fluctuating Course  - Is there evidence of an acute change in mental status from the patient’s baseline? Did the (abnormal) behavior  fluctuate during the day, that is, tend to come and go, or increase and decrease in severity?  [ ] YES [ ] NO     2: Inattention  - Did the patient have difficulty focusing attention, being easily distractible, or having difficulty keeping track of what was being said?  [ ] YES [ ] NO     3: Disorganized thinking  -Was the patient’s thinking disorganized or incoherent, such as rambling or irrelevant conversation, unclear or illogical flow of ideas, or unpredictable switching from subject to subject?  [ ] YES [ ] NO    4: Altered Level of consciousness?  [ ] YES [ ] NO    The diagnosis of delirium by CAM requires the presence of features 1 and 2 and either 3 or 4.    PRESSORS: [ ] YES [ ] NO    Cardiovascular:  Heart Failure  Acute   Acute on Chronic  Chronic       carvedilol 6.25 milliGRAM(s) Oral every 12 hours    Pulmonary:  albuterol/ipratropium for Nebulization 3 milliLiter(s) Nebulizer every 6 hours    Hematalogic:  apixaban 5 milliGRAM(s) Oral every 12 hours    Other:  anastrozole 1 milliGRAM(s) Oral daily  artificial  tears Solution 1 Drop(s) Both EYES two times a day  chlorhexidine 2% Cloths 1 Application(s) Topical <User Schedule>  insulin lispro (ADMELOG) corrective regimen sliding scale   SubCutaneous every 6 hours  pantoprazole  Injectable 40 milliGRAM(s) IV Push daily  simvastatin 20 milliGRAM(s) Oral at bedtime    albuterol/ipratropium for Nebulization 3 milliLiter(s) Nebulizer every 6 hours  anastrozole 1 milliGRAM(s) Oral daily  apixaban 5 milliGRAM(s) Oral every 12 hours  artificial  tears Solution 1 Drop(s) Both EYES two times a day  carvedilol 6.25 milliGRAM(s) Oral every 12 hours  chlorhexidine 2% Cloths 1 Application(s) Topical <User Schedule>  insulin lispro (ADMELOG) corrective regimen sliding scale   SubCutaneous every 6 hours  pantoprazole  Injectable 40 milliGRAM(s) IV Push daily  simvastatin 20 milliGRAM(s) Oral at bedtime    Drug Dosing Weight  Height (cm): 154.9 (30 Jan 2023 16:23)  Weight (kg): 120 (03 Feb 2023 15:15)  BMI (kg/m2): 50 (03 Feb 2023 15:15)  BSA (m2): 2.13 (03 Feb 2023 15:15)    CENTRAL LINE: [ ] YES [ ] NO  LOCATION:   DATE INSERTED:  REMOVE: [ ] YES [ ] NO  EXPLAIN:    AGUIRRE: [ ] YES [ ] NO    DATE INSERTED:  REMOVE:  [ ] YES [ ] NO  EXPLAIN:    PAST MEDICAL & SURGICAL HISTORY:  Arthritis      Legally blind      Pre-diabetes      Breast cancer  right, no chemo or radiation      COPD exacerbation      Atrial fibrillation      HF (heart failure)  HFpEF 7/29      HTN (hypertension)      Deep vein thrombosis (DVT)  Left Lower Extremity      H/O CHF      No significant past surgical history            ABG - ( 04 Feb 2023 03:34 )  pH, Arterial: 7.36  pH, Blood: x     /  pCO2: 75    /  pO2: 111   / HCO3: 42    / Base Excess: 13.4  /  SaO2: 99                    02-03 @ 07:01  -  02-04 @ 07:00  --------------------------------------------------------  IN: 0 mL / OUT: 1090 mL / NET: -1090 mL            PHYSICAL EXAM:    GENERAL: NAD, well-groomed, well-developed  HEAD:  Atraumatic, Normocephalic  EYES: EOMI, PERRLA, conjunctiva and sclera clear  ENMT: No tonsillar erythema, exudates, or enlargement; Moist mucous membranes, Good dentition, No lesions  NECK: Supple, No JVD, Normal thyroid  NERVOUS SYSTEM:  Alert & Oriented X3, Good concentration; Motor Strength 5/5 B/L upper and lower extremities; DTRs 2+ intact and symmetric  CHEST/LUNG: Clear to percussion bilaterally; No rales, rhonchi, wheezing, or rubs  HEART: Regular rate and rhythm; No murmurs, rubs, or gallops  ABDOMEN: Soft, Nontender, Nondistended; Bowel sounds present  EXTREMITIES:  2+ Peripheral Pulses, No clubbing, cyanosis, or edema  LYMPH: No lymphadenopathy noted  SKIN: No rashes or lesions      LABS:  CBC Full  -  ( 04 Feb 2023 03:48 )  WBC Count : 5.58 K/uL  RBC Count : 4.71 M/uL  Hemoglobin : 14.7 g/dL  Hematocrit : 48.7 %  Platelet Count - Automated : 253 K/uL  Mean Cell Volume : 103.4 fl  Mean Cell Hemoglobin : 31.2 pg  Mean Cell Hemoglobin Concentration : 30.2 gm/dL  Auto Neutrophil # : 4.50 K/uL  Auto Lymphocyte # : 0.88 K/uL  Auto Monocyte # : 0.16 K/uL  Auto Eosinophil # : 0.00 K/uL  Auto Basophil # : 0.01 K/uL  Auto Neutrophil % : 80.6 %  Auto Lymphocyte % : 15.8 %  Auto Monocyte % : 2.9 %  Auto Eosinophil % : 0.0 %  Auto Basophil % : 0.2 %    02-04    135  |  99  |  20<H>  ----------------------------<  174<H>  4.5   |  33<H>  |  0.82    Ca    9.4      04 Feb 2023 05:25  Phos  3.4     02-04  Mg     2.6     02-04    TPro  6.8  /  Alb  2.9<L>  /  TBili  0.8  /  DBili  x   /  AST  14  /  ALT  26  /  AlkPhos  106  02-04              [  ]  DVT Prophylaxis  [  ]  Nutrition, Brand, Rate         Goal Rate        Abnormal Nutritional Status -  Malnutrition   Cachexia      Morbid Obesity BMI >/=40    RADIOLOGY & ADDITIONAL STUDIES:  ***    Goals of Care Discussion with Family/Proxy/Other   - see note from/family meeting set up for...     NUNO LAST    SCU progress note    INTERVAL HPI/OVERNIGHT EVENTS: Patient downgraded from ICU to AI. On 2L via NC.     Full code    Covid - 19 PCR: non-reactive.     The 4Ms    What Matters Most: see GOC  Age appropriate Medications/Screen for High Risk Medication: Yes  Mentation: see CAM below  Mobility: defer to physical exam    The Confusion Assessment Method (CAM) Diagnostic Algorithm     1: Acute Onset or Fluctuating Course  - Is there evidence of an acute change in mental status from the patient’s baseline? Did the (abnormal) behavior  fluctuate during the day, that is, tend to come and go, or increase and decrease in severity?  [ ] YES [x ] NO     2: Inattention  - Did the patient have difficulty focusing attention, being easily distractible, or having difficulty keeping track of what was being said?  [ ] YES [ x] NO     3: Disorganized thinking  -Was the patient’s thinking disorganized or incoherent, such as rambling or irrelevant conversation, unclear or illogical flow of ideas, or unpredictable switching from subject to subject?  [ ] YES [x ] NO    4: Altered Level of consciousness?  [ ] YES [x ] NO    The diagnosis of delirium by CAM requires the presence of features 1 and 2 and either 3 or 4.    PRESSORS: [ ] YES [ x] NO    Cardiovascular:  Heart Failure  Acute   Acute on Chronic  Chronic       carvedilol 6.25 milliGRAM(s) Oral every 12 hours    Pulmonary:  albuterol/ipratropium for Nebulization 3 milliLiter(s) Nebulizer every 6 hours    Hematalogic:  apixaban 5 milliGRAM(s) Oral every 12 hours    Other:  anastrozole 1 milliGRAM(s) Oral daily  artificial  tears Solution 1 Drop(s) Both EYES two times a day  chlorhexidine 2% Cloths 1 Application(s) Topical <User Schedule>  insulin lispro (ADMELOG) corrective regimen sliding scale   SubCutaneous every 6 hours  pantoprazole  Injectable 40 milliGRAM(s) IV Push daily  simvastatin 20 milliGRAM(s) Oral at bedtime    albuterol/ipratropium for Nebulization 3 milliLiter(s) Nebulizer every 6 hours  anastrozole 1 milliGRAM(s) Oral daily  apixaban 5 milliGRAM(s) Oral every 12 hours  artificial  tears Solution 1 Drop(s) Both EYES two times a day  carvedilol 6.25 milliGRAM(s) Oral every 12 hours  chlorhexidine 2% Cloths 1 Application(s) Topical <User Schedule>  insulin lispro (ADMELOG) corrective regimen sliding scale   SubCutaneous every 6 hours  pantoprazole  Injectable 40 milliGRAM(s) IV Push daily  simvastatin 20 milliGRAM(s) Oral at bedtime    Drug Dosing Weight  Height (cm): 154.9 (30 Jan 2023 16:23)  Weight (kg): 120 (03 Feb 2023 15:15)  BMI (kg/m2): 50 (03 Feb 2023 15:15)  BSA (m2): 2.13 (03 Feb 2023 15:15)    CENTRAL LINE: [ ] YES [ ] NO  LOCATION:   DATE INSERTED:  REMOVE: [ ] YES [ ] NO  EXPLAIN:    AGUIRRE: [ ] YES [ ] NO    DATE INSERTED:  REMOVE:  [ ] YES [ ] NO  EXPLAIN:    PAST MEDICAL & SURGICAL HISTORY:  Arthritis      Legally blind      Pre-diabetes      Breast cancer  right, no chemo or radiation      COPD exacerbation      Atrial fibrillation      HF (heart failure)  HFpEF 7/29      HTN (hypertension)      Deep vein thrombosis (DVT)  Left Lower Extremity      H/O CHF      No significant past surgical history    ABG - ( 04 Feb 2023 03:34 )  pH, Arterial: 7.36  pH, Blood: x     /  pCO2: 75    /  pO2: 111   / HCO3: 42    / Base Excess: 13.4  /  SaO2: 99            02-03 @ 07:01  -  02-04 @ 07:00  --------------------------------------------------------  IN: 0 mL / OUT: 1090 mL / NET: -1090 mL      PHYSICAL EXAM:    GENERAL: NAD, well-groomed, well-developed  HEAD:  Atraumatic, Normocephalic  EYES: Legally blind. Unable to visualize left pupil.  conjunctiva and sclera clear.   ENMT:  Moist mucous membranes, edentulous No lesions  NECK: Supple, No JVD  NERVOUS SYSTEM:  Alert to Good concentration; Motor Strength 5/5 B/L upper and lower extremities; DTRs 2+ intact and symmetric  CHEST/LUNG: Clear to percussion bilaterally; No rales, rhonchi, wheezing, or rubs  HEART: Regular rate and rhythm; No murmurs, rubs, or gallops  ABDOMEN: Soft, Nontender, Nondistended; Bowel sounds present  EXTREMITIES:  2+ Peripheral Pulses, No clubbing, cyanosis, or edema  LYMPH: No lymphadenopathy noted  SKIN: No rashes or lesions      LABS:  CBC Full  -  ( 04 Feb 2023 03:48 )  WBC Count : 5.58 K/uL  RBC Count : 4.71 M/uL  Hemoglobin : 14.7 g/dL  Hematocrit : 48.7 %  Platelet Count - Automated : 253 K/uL  Mean Cell Volume : 103.4 fl  Mean Cell Hemoglobin : 31.2 pg  Mean Cell Hemoglobin Concentration : 30.2 gm/dL  Auto Neutrophil # : 4.50 K/uL  Auto Lymphocyte # : 0.88 K/uL  Auto Monocyte # : 0.16 K/uL  Auto Eosinophil # : 0.00 K/uL  Auto Basophil # : 0.01 K/uL  Auto Neutrophil % : 80.6 %  Auto Lymphocyte % : 15.8 %  Auto Monocyte % : 2.9 %  Auto Eosinophil % : 0.0 %  Auto Basophil % : 0.2 %    02-04    135  |  99  |  20<H>  ----------------------------<  174<H>  4.5   |  33<H>  |  0.82    Ca    9.4      04 Feb 2023 05:25  Phos  3.4     02-04  Mg     2.6     02-04    TPro  6.8  /  Alb  2.9<L>  /  TBili  0.8  /  DBili  x   /  AST  14  /  ALT  26  /  AlkPhos  106  02-04              [  ]  DVT Prophylaxis  [  ]  Nutrition, Brand, Rate         Goal Rate        Abnormal Nutritional Status -  Malnutrition   Cachexia      Morbid Obesity BMI >/=40    RADIOLOGY & ADDITIONAL STUDIES:  ***    Goals of Care Discussion with Family/Proxy/Other   - see note from/family meeting set up for...     NUNO LAST    SCU progress note    INTERVAL HPI/OVERNIGHT EVENTS: Patient downgraded from ICU to AI. On 2L via NC.     Full code    Covid - 19 PCR: non-reactive.     The 4Ms    What Matters Most: see GOC  Age appropriate Medications/Screen for High Risk Medication: Yes  Mentation: see CAM below  Mobility: defer to physical exam    The Confusion Assessment Method (CAM) Diagnostic Algorithm     1: Acute Onset or Fluctuating Course  - Is there evidence of an acute change in mental status from the patient’s baseline? Did the (abnormal) behavior  fluctuate during the day, that is, tend to come and go, or increase and decrease in severity?  [ ] YES [x ] NO     2: Inattention  - Did the patient have difficulty focusing attention, being easily distractible, or having difficulty keeping track of what was being said?  [ ] YES [ x] NO     3: Disorganized thinking  -Was the patient’s thinking disorganized or incoherent, such as rambling or irrelevant conversation, unclear or illogical flow of ideas, or unpredictable switching from subject to subject?  [ ] YES [x ] NO    4: Altered Level of consciousness?  [ ] YES [x ] NO    The diagnosis of delirium by CAM requires the presence of features 1 and 2 and either 3 or 4.    PRESSORS: [ ] YES [ x] NO    Cardiovascular:  Heart Failure  Acute   Acute on Chronic  Chronic       carvedilol 6.25 milliGRAM(s) Oral every 12 hours    Pulmonary:  albuterol/ipratropium for Nebulization 3 milliLiter(s) Nebulizer every 6 hours    Hematalogic:  apixaban 5 milliGRAM(s) Oral every 12 hours    Other:  anastrozole 1 milliGRAM(s) Oral daily  artificial  tears Solution 1 Drop(s) Both EYES two times a day  chlorhexidine 2% Cloths 1 Application(s) Topical <User Schedule>  insulin lispro (ADMELOG) corrective regimen sliding scale   SubCutaneous every 6 hours  pantoprazole  Injectable 40 milliGRAM(s) IV Push daily  simvastatin 20 milliGRAM(s) Oral at bedtime    albuterol/ipratropium for Nebulization 3 milliLiter(s) Nebulizer every 6 hours  anastrozole 1 milliGRAM(s) Oral daily  apixaban 5 milliGRAM(s) Oral every 12 hours  artificial  tears Solution 1 Drop(s) Both EYES two times a day  carvedilol 6.25 milliGRAM(s) Oral every 12 hours  chlorhexidine 2% Cloths 1 Application(s) Topical <User Schedule>  insulin lispro (ADMELOG) corrective regimen sliding scale   SubCutaneous every 6 hours  pantoprazole  Injectable 40 milliGRAM(s) IV Push daily  simvastatin 20 milliGRAM(s) Oral at bedtime    Drug Dosing Weight  Height (cm): 154.9 (30 Jan 2023 16:23)  Weight (kg): 120 (03 Feb 2023 15:15)  BMI (kg/m2): 50 (03 Feb 2023 15:15)  BSA (m2): 2.13 (03 Feb 2023 15:15)    CENTRAL LINE: [ ] YES [ ] NO  LOCATION:   DATE INSERTED:  REMOVE: [ ] YES [ ] NO  EXPLAIN:    AGUIRRE: [ ] YES [ ] NO    DATE INSERTED:  REMOVE:  [ ] YES [ ] NO  EXPLAIN:    PAST MEDICAL & SURGICAL HISTORY:  Arthritis      Legally blind      Pre-diabetes      Breast cancer  right, no chemo or radiation      COPD exacerbation      Atrial fibrillation      HF (heart failure)  HFpEF 7/29      HTN (hypertension)      Deep vein thrombosis (DVT)  Left Lower Extremity      H/O CHF      No significant past surgical history    ABG - ( 04 Feb 2023 03:34 )  pH, Arterial: 7.36  pH, Blood: x     /  pCO2: 75    /  pO2: 111   / HCO3: 42    / Base Excess: 13.4  /  SaO2: 99            02-03 @ 07:01  -  02-04 @ 07:00  --------------------------------------------------------  IN: 0 mL / OUT: 1090 mL / NET: -1090 mL      PHYSICAL EXAM:  GENERAL: NAD. Morbidly obese.   HEAD:  Atraumatic, Normocephalic  EYES: Legally blind. Unable to visualize left pupil.  conjunctiva and sclera clear.   ENMT:  Moist mucous membranes, edentulous No lesions  NECK: Supple, No JVD  NERVOUS SYSTEM:  Alert to self, month and place (hospital). Confused to year (2020) and situation. Speech clear. Follows simple commands. Lifts upper exts AG. Wiggles toes.   CHEST/LUNG: Clear to percussion bilaterally; No rales, rhonchi, wheezing, or rubs  HEART: Regular rate and rhythm; No murmurs, rubs, or gallops  ABDOMEN: Soft, Nontender, Nondistended; Bowel sounds present  EXTREMITIES:  2+ Peripheral Pulses, No clubbing, cyanosis, +3 pitting edema of ankles b/l.   LYMPH: No lymphadenopathy noted  SKIN: No rashes or lesions      LABS:  CBC Full  -  ( 04 Feb 2023 03:48 )  WBC Count : 5.58 K/uL  RBC Count : 4.71 M/uL  Hemoglobin : 14.7 g/dL  Hematocrit : 48.7 %  Platelet Count - Automated : 253 K/uL  Mean Cell Volume : 103.4 fl  Mean Cell Hemoglobin : 31.2 pg  Mean Cell Hemoglobin Concentration : 30.2 gm/dL  Auto Neutrophil # : 4.50 K/uL  Auto Lymphocyte # : 0.88 K/uL  Auto Monocyte # : 0.16 K/uL  Auto Eosinophil # : 0.00 K/uL  Auto Basophil # : 0.01 K/uL  Auto Neutrophil % : 80.6 %  Auto Lymphocyte % : 15.8 %  Auto Monocyte % : 2.9 %  Auto Eosinophil % : 0.0 %  Auto Basophil % : 0.2 %    02-04    135  |  99  |  20<H>  ----------------------------<  174<H>  4.5   |  33<H>  |  0.82    Ca    9.4      04 Feb 2023 05:25  Phos  3.4     02-04  Mg     2.6     02-04    TPro  6.8  /  Alb  2.9<L>  /  TBili  0.8  /  DBili  x   /  AST  14  /  ALT  26  /  AlkPhos  106  02-04      [x  ]  DVT Prophylaxis    RADIOLOGY & ADDITIONAL STUDIES:    < from: CT Chest No Cont (01.31.23 @ 16:36) >  ACC: [id number] EXAM:  CT CHEST   ORDERED BY: FIONA PRETTY     PROCEDURE DATE:  01/31/2023          INTERPRETATION:  HISTORY: Admitting Dxs: J44.1 CHRONIC OBSTRUCTIVE   PULMONARY DISEASE W (ACUTE) EXACERBATION    EXAMINATION: CT CHEST was performed without IV contrast.    COMPARISON: 7/28/2022.    FINDINGS:    Image quality degraded due to extensive respiratory motion.    AIRWAYS, LUNGS, PLEURA: Trachea and mainstem bronchi patent. Biapical   scarring unchanged. Bibasilar atelectasis. No focal lung consolidation.   No pleural effusion.    MEDIASTINUM: Cardiomegaly. No pericardial effusion. Thoracic aorta normal   caliber.  No large mediastinal lymph nodes.    IMAGED ABDOMEN: Unremarkable.    SOFT TISSUES: Unremarkable.    BONES: Unremarkable.      IMPRESSION:.    No focal lung consolidation.    Bibasilar atelectasis.    --- End of Report ---       TOM COATES MD; Attending Radiologist  This document has been electronically signed. Jan 31 2023  4:45PM    < end of copied text >

## 2023-02-04 NOTE — CONSULT NOTE ADULT - PROBLEM SELECTOR RECOMMENDATION 6
pasnculture  antibiotics
.  - see GOC above    In addition to the E/M visit, an advance care planning meeting was performed. Start time:1130 ; End time: 1210; Total time: 40 min. A telephone meeting to discuss advance care planning was held today regarding: NUNO LAST. Primary decision maker:  Patient is unable to participate in decision making;  Alternate/surrogate:   7 children are surrogates . Discussed advance directives including, but not limited to, healthcare proxy and code status, as well as disease trajectory, patient's values/goals, and health care options that are available for end of life care. Decision regarding code status: FULL CODE; Documentation completed today:  GOC note

## 2023-02-04 NOTE — PROGRESS NOTE ADULT - ASSESSMENT
Patient is a 89 yo F, from home, with PMHx HTN, COPD, Afib, HFpEF (7/29/22 EF 55-60%, LVH, GIDD, Breast CA, and remote h/o LLE DVT p/w SOB and hypoxia to high 70s, admitted to medicine for AHRF 2/2 Acute diastolic CHF +/- COPD exacerbation and UTI (completed treatment). ICU consulted for concerns of AMS in the setting of Acute on chronic compensated respiratory acidosis requiring new trial of Bipap.  Patient downgraded to AI for further management. Currently on 2L NC. Will need BiPAP Q HS.

## 2023-02-04 NOTE — PROCEDURE NOTE - ADDITIONAL PROCEDURE DETAILS
20g 6cm extended dwell IV (Arrow) placed with sterile technique under ultrasound guidance on 2/4. Confirmed via ultrasound and VBG. Can be used for up to 30 days. Above details and extended dwell policy reviewed in full with primary team NP/Resident and RN.

## 2023-02-04 NOTE — CONSULT NOTE ADULT - CONVERSATION DETAILS
Chart reviewed. Met pt at bedside, unable to participate in complex medical decision making. Spoke w daughter Toña at bedside, introduced role of palliative medicine for GOC, ACP, and support. Reviewed hospital course and interval developments; pt weaned off BiPap and is comfortable on NC this morning. Toña deferring GOC discussion to pts grand daughter, Martin.     Reached out to Martin via telephone and explored advanced directives. Pt does not have a HCP. Explained that without HCP, pts 7 adult children are collective surrogate decision makers. Readdressed code status including risks/benefits/burdens of CPR/intubation and emphasized poor outcomes in setting of advanced age, baseline functional status, comorbidities including COPD and HF. Explained that it is unlikely that pt would return to functional or cognitive baselines if she were to undergo heroic measures and emphasized risk that pt may not be able to be weaned from vent in light of her COPD. Alternatively, discussed allowing a natural death.     Martin appreciative of discussion and expressed understanding. Continue full code for now, however gr daughter to speak further with family regarding allowing natural death.

## 2023-02-04 NOTE — PROGRESS NOTE ADULT - ATTENDING COMMENTS
IMP :  This is a  89 yr old morbid obese   elderly woman , from home, with  HTN, COPD, Afib, HFpEF (7/29/22 EF 55-60%, LVH, GIDD, Breast CA, and remote h/o LLE DVT p/w SOB and hypoxia to high 70s, admitted to medicine for AHRF 2/2 Acute diastolic CHF +/- COPD exacerbation and UTI (completed treatment). ICU consulted for concerns of AMS in the setting of Acute on chronic compensated respiratory acidosis requiring new trial of Bipap.    ASSESSMENT and PLAN  - Acute on chronic respiratory acidosis in the setting of COPD  - Acute encephalopathy likely 2/2 Hypercapnia  - Acute diastolic heart failure  - Klebsiella UTI (completed Rx)  - HLD      Plan     - Mental status improved with AVAPS  - Continue AVAPS at night and alternate wit O2 dring the day   - No sedation   - O2 Supp to maintain sat >90%  - Will intubate as needed   - Asp precaution   - NPO for PAP devise   - Hemodynamic monitoring   - No antibx  - Duonebs q6h   - Solumedrol 40 mg daily   - Continue cardiac meds   - Hold diuresis , pat is over diurese with volume contraction alkalosis   - DVT GI prophy

## 2023-02-04 NOTE — PROCEDURE NOTE - NSAPPROACH_GEN_A_CORE
Per Dr. Stone: Please send Rx of benzonatate 200 mg tid prn #21. Increase fluids. Should be seen if worse or new sx    peripheral

## 2023-02-04 NOTE — CONSULT NOTE ADULT - PROBLEM SELECTOR RECOMMENDATION 3
monitor BP  cont meds
.  in setting of acute on chronic respiratory acidosis 2/2 COPD, acute on chronic CHF. prior to admission had not been on home oxygen, however anticipate that home oxygen with initiated upon discharge   - COPD mgmt, diuresis care per primary team   - full code

## 2023-02-04 NOTE — PROGRESS NOTE ADULT - SUBJECTIVE AND OBJECTIVE BOX
INTERVAL HPI/OVERNIGHT EVENTS: No acute events noted overnight.     PRESSORS: [ ] YES [ ] NO  WHICH:    ANTIBIOTICS:                  DATE STARTED:  ANTIBIOTICS:                  DATE STARTED:  ANTIBIOTICS:                  DATE STARTED:    Antimicrobial:    Cardiovascular:  carvedilol 6.25 milliGRAM(s) Oral every 12 hours    Pulmonary:  albuterol/ipratropium for Nebulization 3 milliLiter(s) Nebulizer every 6 hours    Hematalogic:  enoxaparin Injectable 120 milliGRAM(s) SubCutaneous every 12 hours    Other:  anastrozole 1 milliGRAM(s) Oral daily  artificial  tears Solution 1 Drop(s) Both EYES two times a day  chlorhexidine 2% Cloths 1 Application(s) Topical <User Schedule>  insulin lispro (ADMELOG) corrective regimen sliding scale   SubCutaneous every 6 hours  methylPREDNISolone sodium succinate Injectable 40 milliGRAM(s) IV Push every 6 hours  pantoprazole  Injectable 40 milliGRAM(s) IV Push daily      Drug Dosing Weight  Height (cm): 154.9 (30 Jan 2023 16:23)  Weight (kg): 120 (03 Feb 2023 15:15)  BMI (kg/m2): 50 (03 Feb 2023 15:15)  BSA (m2): 2.13 (03 Feb 2023 15:15)    CENTRAL LINE: [ ] YES [ ] NO  LOCATION:   DATE INSERTED:  REMOVE: [ ] YES [ ] NO  EXPLAIN:    AGUIRRE: [ ] YES [ ] NO    DATE INSERTED:  REMOVE:  [ ] YES [ ] NO  EXPLAIN:    A-LINE:  [ ] YES [ ] NO  LOCATION:   DATE INSERTED:  REMOVE:  [ ] YES [ ] NO  EXPLAIN:    PMH/Social Hx/Fam Hx -reviewed admission note, no change since admission  PAST MEDICAL & SURGICAL HISTORY:  Arthritis      Legally blind      Pre-diabetes      Breast cancer  right, no chemo or radiation      COPD exacerbation      Atrial fibrillation      HF (heart failure)  HFpEF 7/29      HTN (hypertension)      Deep vein thrombosis (DVT)  Left Lower Extremity      H/O CHF      No significant past surgical history      T(C): 36.6 (02-03-23 @ 20:00), Max: 36.8 (02-03-23 @ 12:15)  HR: 73 (02-04-23 @ 00:18)  BP: 119/80 (02-03-23 @ 21:00)  BP(mean): 90 (02-03-23 @ 21:00)  ABP: --  ABP(mean): --  RR: 15 (02-03-23 @ 21:00)  SpO2: 100% (02-04-23 @ 00:18)  Wt(kg): --    ABG - ( 03 Feb 2023 17:25 )  pH, Arterial: 7.36  pH, Blood: x     /  pCO2: 80    /  pO2: 116   / HCO3: 45    / Base Excess: 15.6  /  SaO2: 99                    02-02 @ 07:01  -  02-03 @ 07:00  --------------------------------------------------------  IN: 0 mL / OUT: 2500 mL / NET: -2500 mL        PHYSICAL EXAM:  GENERAL: Elderly obese female sitting upright, overall lethargic, not in distress  HEAD:  Atraumatic, Normocephalic  EYES: EOMI, PERRLA, conjunctiva and sclera clear  NECK: Supple, No JVD  CHEST/LUNG: Clear to auscultation bilaterally; No wheeze  HEART: Regular rate and rhythm; s1+ s2+  ABDOMEN: Soft, Nontender, Nondistended; Bowel sounds present  EXTREMITIES:, chronic LE edema ACE wrapped   NEUROLOGY: AAO x 1, responds to sternal rub/painful stimulus, moving extremities  SKIN: No rashes or lesions          LABS:  CBC Full  -  ( 03 Feb 2023 06:10 )  WBC Count : 7.48 K/uL  RBC Count : 4.17 M/uL  Hemoglobin : 12.8 g/dL  Hematocrit : 43.7 %  Platelet Count - Automated : 276 K/uL  Mean Cell Volume : 104.8 fl  Mean Cell Hemoglobin : 30.7 pg  Mean Cell Hemoglobin Concentration : 29.3 gm/dL  Auto Neutrophil # : x  Auto Lymphocyte # : x  Auto Monocyte # : x  Auto Eosinophil # : x  Auto Basophil # : x  Auto Neutrophil % : x  Auto Lymphocyte % : x  Auto Monocyte % : x  Auto Eosinophil % : x  Auto Basophil % : x    02-03    140  |  96  |  19<H>  ----------------------------<  127<H>  5.0   |  40<H>  |  0.88    Ca    8.9      03 Feb 2023 06:10  Phos  2.6     02-03  Mg     2.6     02-03              RADIOLOGY & ADDITIONAL STUDIES REVIEWED:      [ ]GOALS OF CARE DISCUSSION WITH PATIENT/FAMILY/PROXY:    CRITICAL CARE TIME SPENT: 35 minutes

## 2023-02-04 NOTE — PROGRESS NOTE ADULT - PROBLEM SELECTOR PLAN 2
Acute on chronic respiratory acidosis and  Acute diastolic heart failure.   COPD treated with PO Prednisone 40 mg x 5 days (converted to solumedrol IV 40mg daily).   Lasix decreased to 20 mg IV daily as patient does not appear in fluid overload at this time.   S/p  Bipap/AVAPS trial in ICU.   Patient now on 2-4L NC and  NIPPV QHS.

## 2023-02-04 NOTE — PROGRESS NOTE ADULT - PROBLEM SELECTOR PLAN 10
Full code  From home family   New BiPAP and O2  Will need O2 eval  Will need PT eval  CM to follow Full code  Palliative care following.   From home family   New BiPAP and O2  Will need home O2 eval  PT reccs AMY 1/31/2023.   CM to follow

## 2023-02-05 DIAGNOSIS — J96.01 ACUTE RESPIRATORY FAILURE WITH HYPOXIA: ICD-10-CM

## 2023-02-05 LAB
ALBUMIN SERPL ELPH-MCNC: 2.7 G/DL — LOW (ref 3.5–5)
ALP SERPL-CCNC: 95 U/L — SIGNIFICANT CHANGE UP (ref 40–120)
ALT FLD-CCNC: 26 U/L DA — SIGNIFICANT CHANGE UP (ref 10–60)
ANION GAP SERPL CALC-SCNC: 4 MMOL/L — LOW (ref 5–17)
AST SERPL-CCNC: 12 U/L — SIGNIFICANT CHANGE UP (ref 10–40)
BASOPHILS # BLD AUTO: 0.01 K/UL — SIGNIFICANT CHANGE UP (ref 0–0.2)
BASOPHILS NFR BLD AUTO: 0.1 % — SIGNIFICANT CHANGE UP (ref 0–2)
BILIRUB SERPL-MCNC: 0.9 MG/DL — SIGNIFICANT CHANGE UP (ref 0.2–1.2)
BUN SERPL-MCNC: 22 MG/DL — HIGH (ref 7–18)
CALCIUM SERPL-MCNC: 9.2 MG/DL — SIGNIFICANT CHANGE UP (ref 8.4–10.5)
CHLORIDE SERPL-SCNC: 99 MMOL/L — SIGNIFICANT CHANGE UP (ref 96–108)
CO2 SERPL-SCNC: 36 MMOL/L — HIGH (ref 22–31)
CREAT SERPL-MCNC: 0.86 MG/DL — SIGNIFICANT CHANGE UP (ref 0.5–1.3)
EGFR: 64 ML/MIN/1.73M2 — SIGNIFICANT CHANGE UP
EOSINOPHIL # BLD AUTO: 0.24 K/UL — SIGNIFICANT CHANGE UP (ref 0–0.5)
EOSINOPHIL NFR BLD AUTO: 2.9 % — SIGNIFICANT CHANGE UP (ref 0–6)
GLUCOSE BLDC GLUCOMTR-MCNC: 128 MG/DL — HIGH (ref 70–99)
GLUCOSE BLDC GLUCOMTR-MCNC: 130 MG/DL — HIGH (ref 70–99)
GLUCOSE BLDC GLUCOMTR-MCNC: 181 MG/DL — HIGH (ref 70–99)
GLUCOSE BLDC GLUCOMTR-MCNC: 209 MG/DL — HIGH (ref 70–99)
GLUCOSE BLDC GLUCOMTR-MCNC: 229 MG/DL — HIGH (ref 70–99)
GLUCOSE SERPL-MCNC: 152 MG/DL — HIGH (ref 70–99)
HCT VFR BLD CALC: 43.7 % — SIGNIFICANT CHANGE UP (ref 34.5–45)
HGB BLD-MCNC: 13.6 G/DL — SIGNIFICANT CHANGE UP (ref 11.5–15.5)
IMM GRANULOCYTES NFR BLD AUTO: 0.4 % — SIGNIFICANT CHANGE UP (ref 0–0.9)
LYMPHOCYTES # BLD AUTO: 1.37 K/UL — SIGNIFICANT CHANGE UP (ref 1–3.3)
LYMPHOCYTES # BLD AUTO: 16.6 % — SIGNIFICANT CHANGE UP (ref 13–44)
MAGNESIUM SERPL-MCNC: 2.5 MG/DL — SIGNIFICANT CHANGE UP (ref 1.6–2.6)
MCHC RBC-ENTMCNC: 31.1 GM/DL — LOW (ref 32–36)
MCHC RBC-ENTMCNC: 31.5 PG — SIGNIFICANT CHANGE UP (ref 27–34)
MCV RBC AUTO: 101.2 FL — HIGH (ref 80–100)
MONOCYTES # BLD AUTO: 0.55 K/UL — SIGNIFICANT CHANGE UP (ref 0–0.9)
MONOCYTES NFR BLD AUTO: 6.7 % — SIGNIFICANT CHANGE UP (ref 2–14)
MRSA PCR RESULT.: SIGNIFICANT CHANGE UP
MRSA PCR RESULT.: SIGNIFICANT CHANGE UP
NEUTROPHILS # BLD AUTO: 6.05 K/UL — SIGNIFICANT CHANGE UP (ref 1.8–7.4)
NEUTROPHILS NFR BLD AUTO: 73.3 % — SIGNIFICANT CHANGE UP (ref 43–77)
NRBC # BLD: 0 /100 WBCS — SIGNIFICANT CHANGE UP (ref 0–0)
PHOSPHATE SERPL-MCNC: 1.8 MG/DL — LOW (ref 2.5–4.5)
PLATELET # BLD AUTO: 263 K/UL — SIGNIFICANT CHANGE UP (ref 150–400)
POTASSIUM SERPL-MCNC: 4.2 MMOL/L — SIGNIFICANT CHANGE UP (ref 3.5–5.3)
POTASSIUM SERPL-SCNC: 4.2 MMOL/L — SIGNIFICANT CHANGE UP (ref 3.5–5.3)
PROT SERPL-MCNC: 6.4 G/DL — SIGNIFICANT CHANGE UP (ref 6–8.3)
RBC # BLD: 4.32 M/UL — SIGNIFICANT CHANGE UP (ref 3.8–5.2)
RBC # FLD: 13.2 % — SIGNIFICANT CHANGE UP (ref 10.3–14.5)
S AUREUS DNA NOSE QL NAA+PROBE: SIGNIFICANT CHANGE UP
S AUREUS DNA NOSE QL NAA+PROBE: SIGNIFICANT CHANGE UP
SODIUM SERPL-SCNC: 139 MMOL/L — SIGNIFICANT CHANGE UP (ref 135–145)
WBC # BLD: 8.25 K/UL — SIGNIFICANT CHANGE UP (ref 3.8–10.5)
WBC # FLD AUTO: 8.25 K/UL — SIGNIFICANT CHANGE UP (ref 3.8–10.5)

## 2023-02-05 RX ORDER — SODIUM,POTASSIUM PHOSPHATES 278-250MG
1 POWDER IN PACKET (EA) ORAL
Refills: 0 | Status: COMPLETED | OUTPATIENT
Start: 2023-02-05 | End: 2023-02-06

## 2023-02-05 RX ORDER — INSULIN LISPRO 100/ML
VIAL (ML) SUBCUTANEOUS
Refills: 0 | Status: DISCONTINUED | OUTPATIENT
Start: 2023-02-05 | End: 2023-02-20

## 2023-02-05 RX ORDER — INSULIN LISPRO 100/ML
VIAL (ML) SUBCUTANEOUS AT BEDTIME
Refills: 0 | Status: DISCONTINUED | OUTPATIENT
Start: 2023-02-05 | End: 2023-02-20

## 2023-02-05 RX ADMIN — Medication 2: at 17:43

## 2023-02-05 RX ADMIN — Medication 40 MILLIGRAM(S): at 05:51

## 2023-02-05 RX ADMIN — APIXABAN 5 MILLIGRAM(S): 2.5 TABLET, FILM COATED ORAL at 05:51

## 2023-02-05 RX ADMIN — Medication 1 PACKET(S): at 21:22

## 2023-02-05 RX ADMIN — CARVEDILOL PHOSPHATE 6.25 MILLIGRAM(S): 80 CAPSULE, EXTENDED RELEASE ORAL at 05:51

## 2023-02-05 RX ADMIN — Medication 2: at 12:59

## 2023-02-05 RX ADMIN — CHLORHEXIDINE GLUCONATE 1 APPLICATION(S): 213 SOLUTION TOPICAL at 06:02

## 2023-02-05 RX ADMIN — SIMVASTATIN 20 MILLIGRAM(S): 20 TABLET, FILM COATED ORAL at 21:22

## 2023-02-05 RX ADMIN — APIXABAN 5 MILLIGRAM(S): 2.5 TABLET, FILM COATED ORAL at 17:57

## 2023-02-05 RX ADMIN — Medication 1 DROP(S): at 06:02

## 2023-02-05 RX ADMIN — ANASTROZOLE 1 MILLIGRAM(S): 1 TABLET ORAL at 11:30

## 2023-02-05 RX ADMIN — PANTOPRAZOLE SODIUM 40 MILLIGRAM(S): 20 TABLET, DELAYED RELEASE ORAL at 11:30

## 2023-02-05 RX ADMIN — Medication 3 MILLILITER(S): at 02:53

## 2023-02-05 RX ADMIN — Medication 3 MILLILITER(S): at 20:59

## 2023-02-05 RX ADMIN — Medication 3 MILLILITER(S): at 14:15

## 2023-02-05 RX ADMIN — Medication 3 MILLILITER(S): at 08:49

## 2023-02-05 RX ADMIN — Medication 1 DROP(S): at 17:57

## 2023-02-05 RX ADMIN — Medication 1 PACKET(S): at 17:57

## 2023-02-05 RX ADMIN — Medication 200 MILLIGRAM(S): at 21:59

## 2023-02-05 RX ADMIN — CARVEDILOL PHOSPHATE 6.25 MILLIGRAM(S): 80 CAPSULE, EXTENDED RELEASE ORAL at 17:57

## 2023-02-05 NOTE — PROGRESS NOTE ADULT - PROBLEM SELECTOR PLAN 11
Full code  Palliative care following.   From home family   New AVAPS and O2  Will need home O2 eval  PT reccs AMY 1/31/2023.   CM to follow.

## 2023-02-05 NOTE — PROGRESS NOTE ADULT - SUBJECTIVE AND OBJECTIVE BOX
NUNO LAST    SCU progress note    INTERVAL HPI/OVERNIGHT EVENTS: ***    DNR [ ]   DNI  [  ]    Covid - 19 PCR: COVID-19 PCR: NotDetec (03 Feb 2023 13:35)  COVID-19 PCR: Detected (16 Oct 2022 06:03)      The 4Ms    What Matters Most: see GOC  Age appropriate Medications/Screen for High Risk Medication: Yes  Mentation: see CAM below  Mobility: defer to physical exam    The Confusion Assessment Method (CAM) Diagnostic Algorithm     1: Acute Onset or Fluctuating Course  - Is there evidence of an acute change in mental status from the patient’s baseline? Did the (abnormal) behavior  fluctuate during the day, that is, tend to come and go, or increase and decrease in severity?  [ ] YES [ ] NO     2: Inattention  - Did the patient have difficulty focusing attention, being easily distractible, or having difficulty keeping track of what was being said?  [ ] YES [ ] NO     3: Disorganized thinking  -Was the patient’s thinking disorganized or incoherent, such as rambling or irrelevant conversation, unclear or illogical flow of ideas, or unpredictable switching from subject to subject?  [ ] YES [ ] NO    4: Altered Level of consciousness?  [ ] YES [ ] NO    The diagnosis of delirium by CAM requires the presence of features 1 and 2 and either 3 or 4.    PRESSORS: [ ] YES [ ] NO    Cardiovascular:  Heart Failure  Acute   Acute on Chronic  Chronic       carvedilol 6.25 milliGRAM(s) Oral every 12 hours    Pulmonary:  albuterol/ipratropium for Nebulization 3 milliLiter(s) Nebulizer every 6 hours    Hematologic:  apixaban 5 milliGRAM(s) Oral every 12 hours    Other:  anastrozole 1 milliGRAM(s) Oral daily  artificial  tears Solution 1 Drop(s) Both EYES two times a day  chlorhexidine 2% Cloths 1 Application(s) Topical <User Schedule>  insulin lispro (ADMELOG) corrective regimen sliding scale   SubCutaneous every 6 hours  methylPREDNISolone sodium succinate Injectable 40 milliGRAM(s) IV Push daily  pantoprazole  Injectable 40 milliGRAM(s) IV Push daily  simvastatin 20 milliGRAM(s) Oral at bedtime    albuterol/ipratropium for Nebulization 3 milliLiter(s) Nebulizer every 6 hours  anastrozole 1 milliGRAM(s) Oral daily  apixaban 5 milliGRAM(s) Oral every 12 hours  artificial  tears Solution 1 Drop(s) Both EYES two times a day  carvedilol 6.25 milliGRAM(s) Oral every 12 hours  chlorhexidine 2% Cloths 1 Application(s) Topical <User Schedule>  insulin lispro (ADMELOG) corrective regimen sliding scale   SubCutaneous every 6 hours  methylPREDNISolone sodium succinate Injectable 40 milliGRAM(s) IV Push daily  pantoprazole  Injectable 40 milliGRAM(s) IV Push daily  simvastatin 20 milliGRAM(s) Oral at bedtime    Drug Dosing Weight  Height (cm): 154.9 (30 Jan 2023 16:23)  Weight (kg): 120 (03 Feb 2023 15:15)  BMI (kg/m2): 50 (03 Feb 2023 15:15)  BSA (m2): 2.13 (03 Feb 2023 15:15)    CENTRAL LINE: [ ] YES [ ] NO  LOCATION:   DATE INSERTED:  REMOVE: [ ] YES [ ] NO  EXPLAIN:    AGUIRRE: [ ] YES [ ] NO    DATE INSERTED:  REMOVE:  [ ] YES [ ] NO  EXPLAIN:    PAST MEDICAL & SURGICAL HISTORY:  Arthritis      Legally blind      Pre-diabetes      Breast cancer  right, no chemo or radiation      COPD exacerbation      Atrial fibrillation      HF (heart failure)  HFpEF 7/29      HTN (hypertension)      Deep vein thrombosis (DVT)  Left Lower Extremity      H/O CHF      No significant past surgical history            ABG - ( 04 Feb 2023 03:34 )  pH, Arterial: 7.36  pH, Blood: x     /  pCO2: 75    /  pO2: 111   / HCO3: 42    / Base Excess: 13.4  /  SaO2: 99                    02-04 @ 07:01  -  02-05 @ 07:00  --------------------------------------------------------  IN: 360 mL / OUT: 225 mL / NET: 135 mL            PHYSICAL EXAM:        LABS:  CBC Full  -  ( 05 Feb 2023 07:09 )  WBC Count : 8.25 K/uL  RBC Count : 4.32 M/uL  Hemoglobin : 13.6 g/dL  Hematocrit : 43.7 %  Platelet Count - Automated : 263 K/uL  Mean Cell Volume : 101.2 fl  Mean Cell Hemoglobin : 31.5 pg  Mean Cell Hemoglobin Concentration : 31.1 gm/dL  Auto Neutrophil # : 6.05 K/uL  Auto Lymphocyte # : 1.37 K/uL  Auto Monocyte # : 0.55 K/uL  Auto Eosinophil # : 0.24 K/uL  Auto Basophil # : 0.01 K/uL  Auto Neutrophil % : 73.3 %  Auto Lymphocyte % : 16.6 %  Auto Monocyte % : 6.7 %  Auto Eosinophil % : 2.9 %  Auto Basophil % : 0.1 %    02-05    139  |  99  |  22<H>  ----------------------------<  152<H>  4.2   |  36<H>  |  0.86    Ca    9.2      05 Feb 2023 07:09  Phos  1.8     02-05  Mg     2.5     02-05    TPro  6.4  /  Alb  2.7<L>  /  TBili  0.9  /  DBili  x   /  AST  12  /  ALT  26  /  AlkPhos  95  02-05              [  ]  DVT Prophylaxis  [  ]   Abnormal Nutritional Status -  Malnutrition   Cachexia      Morbid Obesity BMI >/=40  Diet, Clear Liquid (02-04-23 @ 11:28) [Active]          RADIOLOGY & ADDITIONAL STUDIES:  ***    Goals of Care Discussion with Family/Proxy/Other   - see note from/family meeting set up for...     NUNO LAST    SCU progress note    INTERVAL HPI/OVERNIGHT EVENTS: ***c/o right hip pain overnight.   Pt seen and examined this morning.   Pt endorses feeling okay, no discomfort /trauma at right hip. Pt endorses h/o osteoarthritis for which she takes Tylenol at home.     DNR [ ]   DNI  [  ]  Full code (x)    Covid - 19 PCR: COVID-19 PCR: NotDetec (03 Feb 2023 13:35)  COVID-19 PCR: Detected (16 Oct 2022 06:03)      The 4Ms    What Matters Most: see GOC  Age appropriate Medications/Screen for High Risk Medication: Yes  Mentation: see CAM below  Mobility: defer to physical exam    The Confusion Assessment Method (CAM) Diagnostic Algorithm     1: Acute Onset or Fluctuating Course  - Is there evidence of an acute change in mental status from the patient’s baseline? Did the (abnormal) behavior  fluctuate during the day, that is, tend to come and go, or increase and decrease in severity?  [ ] YES [x ] NO     2: Inattention  - Did the patient have difficulty focusing attention, being easily distractible, or having difficulty keeping track of what was being said?  [ ] YES [x ] NO     3: Disorganized thinking  -Was the patient’s thinking disorganized or incoherent, such as rambling or irrelevant conversation, unclear or illogical flow of ideas, or unpredictable switching from subject to subject?  [ ] YES [x ] NO    4: Altered Level of consciousness?  [ ] YES [x ] NO    The diagnosis of delirium by CAM requires the presence of features 1 and 2 and either 3 or 4.    PRESSORS: [ ] YES [x ] NO    Cardiovascular:  Heart Failure   Chronic       carvedilol 6.25 milliGRAM(s) Oral every 12 hours    Pulmonary:  albuterol/ipratropium for Nebulization 3 milliLiter(s) Nebulizer every 6 hours    Hematologic:  apixaban 5 milliGRAM(s) Oral every 12 hours    Other:  anastrozole 1 milliGRAM(s) Oral daily  artificial  tears Solution 1 Drop(s) Both EYES two times a day  chlorhexidine 2% Cloths 1 Application(s) Topical <User Schedule>  insulin lispro (ADMELOG) corrective regimen sliding scale   SubCutaneous every 6 hours  methylPREDNISolone sodium succinate Injectable 40 milliGRAM(s) IV Push daily  pantoprazole  Injectable 40 milliGRAM(s) IV Push daily  simvastatin 20 milliGRAM(s) Oral at bedtime    albuterol/ipratropium for Nebulization 3 milliLiter(s) Nebulizer every 6 hours  anastrozole 1 milliGRAM(s) Oral daily  apixaban 5 milliGRAM(s) Oral every 12 hours  artificial  tears Solution 1 Drop(s) Both EYES two times a day  carvedilol 6.25 milliGRAM(s) Oral every 12 hours  chlorhexidine 2% Cloths 1 Application(s) Topical <User Schedule>  insulin lispro (ADMELOG) corrective regimen sliding scale   SubCutaneous every 6 hours  methylPREDNISolone sodium succinate Injectable 40 milliGRAM(s) IV Push daily  pantoprazole  Injectable 40 milliGRAM(s) IV Push daily  simvastatin 20 milliGRAM(s) Oral at bedtime    Drug Dosing Weight  Height (cm): 154.9 (30 Jan 2023 16:23)  Weight (kg): 120 (03 Feb 2023 15:15)  BMI (kg/m2): 50 (03 Feb 2023 15:15)  BSA (m2): 2.13 (03 Feb 2023 15:15)    CENTRAL LINE: [ ] YES [ x] NO  LOCATION:   DATE INSERTED:  REMOVE: [ ] YES [ ] NO  EXPLAIN:    AGUIRRE: [ ] YES [x ] NO    DATE INSERTED:  REMOVE:  [ ] YES [ ] NO  EXPLAIN:    PAST MEDICAL & SURGICAL HISTORY:  Arthritis  Legally blind  Pre-diabetes  Breast cancer  right, no chemo or radiation  COPD exacerbation  Atrial fibrillation  HF (heart failure)  HFpEF 7/29  HTN (hypertension)  Deep vein thrombosis (DVT)  Left Lower Extremity    H/O CHF      No significant past surgical history      ABG - ( 04 Feb 2023 03:34 )  pH, Arterial: 7.36  pH, Blood: x     /  pCO2: 75    /  pO2: 111   / HCO3: 42    / Base Excess: 13.4  /  SaO2: 99          02-04 @ 07:01  -  02-05 @ 07:00  --------------------------------------------------------  IN: 360 mL / OUT: 225 mL / NET: 135 mL      PHYSICAL EXAM:  GENERAL: NAD, well-groomed, well-developed  HEAD:  Atraumatic, Normocephalic  EYES: EOMI, PERRLA, conjunctiva and sclera clear  ENMT: No tonsillar erythema, exudates, or enlargement; Moist mucous membranes, Good dentition, No lesions  NECK: Supple, No JVD, Normal thyroid  NERVOUS SYSTEM:  Alert & Oriented X3, Good concentration; Motor Strength 5/5 B/L upper and   2-3 lower extremities. No erythema /swelling at right hip/thigh. Nontender.   CHEST/LUNG: Clear to percussion bilaterally. Decreased at bases; No rales, rhonchi, wheezing, or rubs  HEART: Regular rate and rhythm; No murmurs, rubs, or gallops  ABDOMEN: Soft, Nontender, Nondistended; Bowel sounds present  EXTREMITIES:  Edema BLE. 2+ Peripheral Pulses, No clubbing, cyanosis  LYMPH: No lymphadenopathy noted  SKIN: No rashes or lesions        LABS:  CBC Full  -  ( 05 Feb 2023 07:09 )  WBC Count : 8.25 K/uL  RBC Count : 4.32 M/uL  Hemoglobin : 13.6 g/dL  Hematocrit : 43.7 %  Platelet Count - Automated : 263 K/uL  Mean Cell Volume : 101.2 fl  Mean Cell Hemoglobin : 31.5 pg  Mean Cell Hemoglobin Concentration : 31.1 gm/dL  Auto Neutrophil # : 6.05 K/uL  Auto Lymphocyte # : 1.37 K/uL  Auto Monocyte # : 0.55 K/uL  Auto Eosinophil # : 0.24 K/uL  Auto Basophil # : 0.01 K/uL  Auto Neutrophil % : 73.3 %  Auto Lymphocyte % : 16.6 %  Auto Monocyte % : 6.7 %  Auto Eosinophil % : 2.9 %  Auto Basophil % : 0.1 %    02-05    139  |  99  |  22<H>  ----------------------------<  152<H>  4.2   |  36<H>  |  0.86    Ca    9.2      05 Feb 2023 07:09  Phos  1.8     02-05  Mg     2.5     02-05    TPro  6.4  /  Alb  2.7<L>  /  TBili  0.9  /  DBili  x   /  AST  12  /  ALT  26  /  AlkPhos  95  02-05      [  ]  DVT Prophylaxis  [  ]   Abnormal Nutritional Status -  Malnutrition     Morbid Obesity BMI >/=40  Diet, Clear Liquid (02-04-23 @ 11:28) [Active]        RADIOLOGY & ADDITIONAL STUDIES:  ***      < from: CT Chest No Cont (01.31.23 @ 16:36) >  PROCEDURE DATE:  01/31/2023          INTERPRETATION:  HISTORY: Admitting Dxs: J44.1 CHRONIC OBSTRUCTIVE   PULMONARY DISEASE W (ACUTE) EXACERBATION    EXAMINATION: CT CHEST was performed without IV contrast.    COMPARISON: 7/28/2022.    FINDINGS:    Image quality degraded due to extensive respiratory motion.    AIRWAYS, LUNGS, PLEURA: Trachea and mainstem bronchi patent. Biapical   scarring unchanged. Bibasilar atelectasis. No focal lung consolidation.   No pleural effusion.    MEDIASTINUM: Cardiomegaly. No pericardial effusion. Thoracic aorta normal   caliber.  No large mediastinal lymph nodes.    IMAGED ABDOMEN: Unremarkable.    SOFT TISSUES: Unremarkable.    BONES: Unremarkable.      IMPRESSION:.    No focal lung consolidation.    Bibasilar atelectasis.    < end of copied text >  < from: Xray Chest 1 View- PORTABLE-Urgent (01.30.23 @ 22:00) >    Impression:    There are increased interstitial markings which may represent the   patient's underlying COPD or pulmonary venous congestion. No focal   consolidation or gross effusion.    < end of copied text >    Goals of Care Discussion with Family/Proxy/Other   - see note from/family meeting set up for...

## 2023-02-05 NOTE — DIETITIAN INITIAL EVALUATION ADULT - REASON INDICATOR FOR ASSESSMENT
Staffing:No outbursts over the weekend,but was unable to contract at one point against self harm and so slept in the day area. During group she surreptitiously rubbed her arms with an eraser when she fetl criticized by peers. MSE:alert,calm. showed me her arms,Diod not even thinks of using coping skills. Had a hard time describing any coping skills other than the stress ball. All or nothing thinking,such as how she interpreted the interaction in group that prompted the self harm. She does want to get out of paper PJs and we discussed that will happen if she can go 24 hours without self harm. Met with her adoption subsidy worker  who came to visit her:She said [facility] supports RTC but having trouble finding any place that will take her. A:The pt has a passive approach to tx. PTSD seems to be the underlying factor that fuels manay symptoms. P:cont meds  Is getting secondary gain form one ot one  And so will use different approach, which hopefully will reduce secondary gain. Pt on day area restriction and also will sleep in day area. tomorrow,will keep her out of process group.she does okay in other groups but seems to get overwhelmed in that group.remian in paper pjs for now,will reassess this tomorrow. Patient seen for length of stay

## 2023-02-05 NOTE — PROGRESS NOTE ADULT - PROBLEM SELECTOR PLAN 2
likely 2/2 Hypercapnia  S/p  Bipap/AVAPS trial in ICU.   Improving on NIV   Continue supplemental oxygenation. Wean as tolerated to keep SpO2 >90%  AVAPS qHS  Avoid sedatives and opioids

## 2023-02-05 NOTE — PROGRESS NOTE ADULT - SUBJECTIVE AND OBJECTIVE BOX
Patient is seen and examined at the bed side, is afebrile. She is doing better, awake , alert and on oxygen via NC.  She has transferred out of ICU.      REVIEW OF SYSTEMS: All other review systems are negative      ALLERGIES: Chicken (Stomach Upset)  losartan (Angioedema)      Vital Signs Last 24 Hrs  T(C): 37.1 (2023 12:17), Max: 37.4 (2023 20:28)  T(F): 98.7 (2023 12:17), Max: 99.3 (2023 20:28)  HR: 70 (:17) (70 - 78)  BP: 119/75 (2023 12:17) (119/75 - 140/84)  BP(mean): --  RR: 18 (2023 12:17) (18 - 18)  SpO2: 98% (2023 12:17) (97% - 99%)    Parameters below as of 2023 12:17  Patient On (Oxygen Delivery Method): nasal cannula  O2 Flow (L/min): 2        PHYSICAL EXAM:  GENERAL: Not in distress, on oxygen via  NC  CHEST/LUNG:  Not using accessory muscles   HEART: s1 and s2 present  ABDOMEN:  Nontender and  Nondistended  EXTREMITIES: B/L  pedal  edema  CNS: awake and alert      LABS:                        13.6   8.25  )-----------( 263      ( 2023 07:09 )             43.7                           14.7   5.58  )-----------( 253      ( 2023 03:48 )             48.7         -    139  |  99  |  22<H>  ----------------------------<  152<H>  4.2   |  36<H>  |  0.86    Ca    9.2      2023 07:09  Phos  1.8     02-  Mg     2.5     -05    TPro  6.4  /  Alb  2.7<L>  /  TBili  0.9  /  DBili  x   /  AST  12  /  ALT  26  /  AlkPhos  95  02-05      02-04    135  |  99  |  20<H>  ----------------------------<  174<H>  4.5   |  33<H>  |  0.82    Ca    9.4      2023 05:25  Phos  3.4     02-04  Mg     2.6     02-04    TPro  6.8  /  Alb  2.9<L>  /  TBili  0.8  /  DBili  x   /  AST  14  /  ALT  26  /  AlkPhos  106  02-04    PT/INR - ( 2023 18:00 )   PT: 23.8 sec;   INR: 1.99 ratio      PTT - ( 2023 18:00 )  PTT:34.5 sec      CAPILLARY BLOOD GLUCOSE  POCT Blood Glucose.: 250 mg/dL (2023 01:11)  POCT Blood Glucose.: 176 mg/dL (2023 23:47)  POCT Blood Glucose.: 176 mg/dL (2023 16:31)        Urinalysis Basic - ( 2023 21:30 )  Color: Yellow / Appearance: very cloudy / S.010 / pH: x  Gluc: x / Ketone: Negative  / Bili: Negative / Urobili: Negative   Blood: x / Protein: 30 mg/dL / Nitrite: Negative   Leuk Esterase: Moderate / RBC: 25-50 /HPF / WBC >50 /HPF   Sq Epi: x / Non Sq Epi: Few /HPF / Bacteria: Moderate /HPF        MEDICATIONS  (STANDING):    albuterol/ipratropium for Nebulization 3 milliLiter(s) Nebulizer every 6 hours  anastrozole 1 milliGRAM(s) Oral daily  apixaban 5 milliGRAM(s) Oral every 12 hours  artificial  tears Solution 1 Drop(s) Both EYES two times a day  carvedilol 6.25 milliGRAM(s) Oral every 12 hours  chlorhexidine 2% Cloths 1 Application(s) Topical <User Schedule>  insulin lispro (ADMELOG) corrective regimen sliding scale   SubCutaneous every 6 hours  methylPREDNISolone sodium succinate Injectable 40 milliGRAM(s) IV Push daily  pantoprazole  Injectable 40 milliGRAM(s) IV Push daily  potassium phosphate / sodium phosphate Powder (PHOS-NaK) 1 Packet(s) Oral four times a day before meals  simvastatin 20 milliGRAM(s) Oral at bedtime        RADIOLOGY & ADDITIONAL TESTS:    1/30/23: Xray Chest 1 View- PORTABLE-Urgent (23 @ 22:00) There are increased interstitial markings which may represent the   patient's underlying COPD or pulmonary venous congestion. No focal  consolidation or gross effusion.        MICROBIOLOGY DATA:    Culture - Urine (23 @ 21:30)   Specimen Source: Clean Catch Clean Catch (Midstream)   Culture Results:   >100,000 CFU/ml Klebsiella aerogenes (Previously Enterobacter)     Culture - Blood (23 @ 18:00)   Specimen Source: .Blood Blood-Peripheral   Culture Results: No growth to date.     Culture - Blood (23 @ 17:50)   Specimen Source: .Blood Blood-Peripheral   Culture Results: No growth to date.     Flu With COVID-19 By PATRICK (23 @ 18:00)   SARS-CoV-2 Result: NotDetec             Patient is seen and examined at the bed side, is afebrile. She remains awake, alert and on oxygen via NC.        REVIEW OF SYSTEMS: All other review systems are negative      ALLERGIES: Chicken (Stomach Upset)  losartan (Angioedema)      Vital Signs Last 24 Hrs  T(C): 37.1 (2023 12:17), Max: 37.4 (2023 20:28)  T(F): 98.7 (2023 12:17), Max: 99.3 (2023 20:28)  HR: 70 (2023 12:17) (70 - 78)  BP: 119/75 (2023 12:17) (119/75 - 140/84)  BP(mean): --  RR: 18 (2023 12:17) (18 - 18)  SpO2: 98% (:17) (97% - 99%)    Parameters below as of 2023 12:17  Patient On (Oxygen Delivery Method): nasal cannula  O2 Flow (L/min): 2        PHYSICAL EXAM:  GENERAL: Not in distress, on oxygen via  NC  CHEST/LUNG:  Not using accessory muscles   HEART: s1 and s2 present  ABDOMEN:  Nontender and  Nondistended  EXTREMITIES: B/L  pedal  edema  CNS: awake and alert      LABS:                        13.6   8.25  )-----------( 263      ( 2023 07:09 )             43.7                           14.7   5.58  )-----------( 253      ( 2023 03:48 )             48.7         -    139  |  99  |  22<H>  ----------------------------<  152<H>  4.2   |  36<H>  |  0.86    Ca    9.2      2023 07:09  Phos  1.8     02-05  Mg     2.5     -05    TPro  6.4  /  Alb  2.7<L>  /  TBili  0.9  /  DBili  x   /  AST  12  /  ALT  26  /  AlkPhos  95  02-05      02-04    135  |  99  |  20<H>  ----------------------------<  174<H>  4.5   |  33<H>  |  0.82    Ca    9.4      2023 05:25  Phos  3.4     02-04  Mg     2.6     02-04    TPro  6.8  /  Alb  2.9<L>  /  TBili  0.8  /  DBili  x   /  AST  14  /  ALT  26  /  AlkPhos  106  02-04    PT/INR - ( 2023 18:00 )   PT: 23.8 sec;   INR: 1.99 ratio      PTT - ( 2023 18:00 )  PTT:34.5 sec      CAPILLARY BLOOD GLUCOSE  POCT Blood Glucose.: 250 mg/dL (2023 01:11)  POCT Blood Glucose.: 176 mg/dL (2023 23:47)  POCT Blood Glucose.: 176 mg/dL (2023 16:31)        Urinalysis Basic - ( 2023 21:30 )  Color: Yellow / Appearance: very cloudy / S.010 / pH: x  Gluc: x / Ketone: Negative  / Bili: Negative / Urobili: Negative   Blood: x / Protein: 30 mg/dL / Nitrite: Negative   Leuk Esterase: Moderate / RBC: 25-50 /HPF / WBC >50 /HPF   Sq Epi: x / Non Sq Epi: Few /HPF / Bacteria: Moderate /HPF        MEDICATIONS  (STANDING):    albuterol/ipratropium for Nebulization 3 milliLiter(s) Nebulizer every 6 hours  anastrozole 1 milliGRAM(s) Oral daily  apixaban 5 milliGRAM(s) Oral every 12 hours  artificial  tears Solution 1 Drop(s) Both EYES two times a day  carvedilol 6.25 milliGRAM(s) Oral every 12 hours  chlorhexidine 2% Cloths 1 Application(s) Topical <User Schedule>  insulin lispro (ADMELOG) corrective regimen sliding scale   SubCutaneous every 6 hours  methylPREDNISolone sodium succinate Injectable 40 milliGRAM(s) IV Push daily  pantoprazole  Injectable 40 milliGRAM(s) IV Push daily  potassium phosphate / sodium phosphate Powder (PHOS-NaK) 1 Packet(s) Oral four times a day before meals  simvastatin 20 milliGRAM(s) Oral at bedtime        RADIOLOGY & ADDITIONAL TESTS:    23: Xray Chest 1 View- PORTABLE-Urgent (23 @ 22:00) There are increased interstitial markings which may represent the   patient's underlying COPD or pulmonary venous congestion. No focal  consolidation or gross effusion.        MICROBIOLOGY DATA:    Culture - Urine (23 @ 21:30)   Specimen Source: Clean Catch Clean Catch (Midstream)   Culture Results:   >100,000 CFU/ml Klebsiella aerogenes (Previously Enterobacter)     Culture - Blood (23 @ 18:00)   Specimen Source: .Blood Blood-Peripheral   Culture Results: No growth to date.     Culture - Blood (23 @ 17:50)   Specimen Source: .Blood Blood-Peripheral   Culture Results: No growth to date.     Flu With COVID-19 By PATRICK (23 @ 18:00)   SARS-CoV-2 Result: NotDetec

## 2023-02-05 NOTE — PROGRESS NOTE ADULT - SUBJECTIVE AND OBJECTIVE BOX
Patient is a 89y old  Female who presents with a chief complaint of Chronic obstructive pulmonary disease with acute exacerbation  Awake, alert, comfortable in bed in NAD. Off Bi-pap machine   (05 Feb 2023 10:17)      INTERVAL HPI/OVERNIGHT EVENTS:      VITAL SIGNS:  T(F): 98.1 (02-05-23 @ 05:54)  HR: 78 (02-05-23 @ 05:54)  BP: 140/84 (02-05-23 @ 05:54)  RR: 18 (02-05-23 @ 05:54)  SpO2: 99% (02-05-23 @ 05:54)  Wt(kg): --  I&O's Detail    04 Feb 2023 07:01  -  05 Feb 2023 07:00  --------------------------------------------------------  IN:    Oral Fluid: 360 mL  Total IN: 360 mL    OUT:    Indwelling Catheter - Urethral (mL): 225 mL  Total OUT: 225 mL    Total NET: 135 mL              REVIEW OF SYSTEMS:    CONSTITUTIONAL:  No fevers, chills, sweats    HEENT:  Eyes:  No diplopia or blurred vision. ENT:  No earache, sore throat or runny nose.    CARDIOVASCULAR:  No pressure, squeezing, tightness, or heaviness about the chest; no palpitations.    RESPIRATORY:  Per HPI    GASTROINTESTINAL:  No abdominal pain, nausea, vomiting or diarrhea.    GENITOURINARY:  No dysuria, frequency or urgency.    NEUROLOGIC:  No paresthesias, fasciculations, seizures or weakness.    PSYCHIATRIC:  No disorder of thought or mood.      PHYSICAL EXAM:    Constitutional: Well developed and nourished  Eyes:Perrla  ENMT: normal  Neck:supple  Respiratory: good air entry  Cardiovascular: S1 S2 regular  Gastrointestinal: Soft, Non tender  Extremities: No edema  Vascular:normal  Neurological:Awake, alert,Ox3  Musculoskeletal:Normal      MEDICATIONS  (STANDING):  albuterol/ipratropium for Nebulization 3 milliLiter(s) Nebulizer every 6 hours  anastrozole 1 milliGRAM(s) Oral daily  apixaban 5 milliGRAM(s) Oral every 12 hours  artificial  tears Solution 1 Drop(s) Both EYES two times a day  carvedilol 6.25 milliGRAM(s) Oral every 12 hours  chlorhexidine 2% Cloths 1 Application(s) Topical <User Schedule>  insulin lispro (ADMELOG) corrective regimen sliding scale   SubCutaneous every 6 hours  methylPREDNISolone sodium succinate Injectable 40 milliGRAM(s) IV Push daily  pantoprazole  Injectable 40 milliGRAM(s) IV Push daily  simvastatin 20 milliGRAM(s) Oral at bedtime    MEDICATIONS  (PRN):      Allergies    losartan (Angioedema)    Intolerances    Chicken (Stomach Upset)      LABS:                        13.6   8.25  )-----------( 263      ( 05 Feb 2023 07:09 )             43.7     02-05    139  |  99  |  22<H>  ----------------------------<  152<H>  4.2   |  36<H>  |  0.86    Ca    9.2      05 Feb 2023 07:09  Phos  1.8     02-05  Mg     2.5     02-05    TPro  6.4  /  Alb  2.7<L>  /  TBili  0.9  /  DBili  x   /  AST  12  /  ALT  26  /  AlkPhos  95  02-05        ABG - ( 04 Feb 2023 03:34 )  pH, Arterial: 7.36  pH, Blood: x     /  pCO2: 75    /  pO2: 111   / HCO3: 42    / Base Excess: 13.4  /  SaO2: 99                    CAPILLARY BLOOD GLUCOSE      POCT Blood Glucose.: 130 mg/dL (05 Feb 2023 06:12)  POCT Blood Glucose.: 128 mg/dL (05 Feb 2023 00:23)  POCT Blood Glucose.: 194 mg/dL (04 Feb 2023 17:02)  POCT Blood Glucose.: 167 mg/dL (04 Feb 2023 10:59)    pro-bnp 3916 01-30 @ 20:20     d-dimer --  01-30 @ 20:20      RADIOLOGY & ADDITIONAL TESTS:    CXR:  < from: Xray Chest 1 View- PORTABLE-Urgent (01.30.23 @ 22:00) >  Impression:    There are increased interstitial markings which may represent the   patient's underlying COPD or pulmonary venous congestion. No focal   consolidation or gross effusion.      < end of copied text >    Ct scan chest:    ekg;    echo:

## 2023-02-05 NOTE — PROGRESS NOTE ADULT - PROBLEM SELECTOR PLAN 3
Patient on  Coreg 6.125 mg BID and Lasix 40 mg BID at home.   ECHO July 2022- EF 55-60%, LVH, GIDD, mild pulm HTN  pro-BNP 3916 (prev 1943)  S/p diuresis in ICU  s/p  Lasix 20 mg daily IV  Daily weight   Dr. Siu following.

## 2023-02-05 NOTE — PROGRESS NOTE ADULT - SUBJECTIVE AND OBJECTIVE BOX
CHIEF COMPLAINT:Patient is a 89y old  Female who presents with a chief complaint of AHRF.Pt appears comfortable.    	  REVIEW OF SYSTEMS:  CONSTITUTIONAL: No fever, weight loss, or fatigue  EYES: No eye pain, visual disturbances, or discharge  ENT:  No difficulty hearing, tinnitus, vertigo; No sinus or throat pain  NECK: No pain or stiffness  RESPIRATORY: No cough, wheezing, chills or hemoptysis; No Shortness of Breath  CARDIOVASCULAR: No chest pain, palpitations, passing out, dizziness, or leg swelling  GASTROINTESTINAL: No abdominal or epigastric pain. No nausea, vomiting, or hematemesis; No diarrhea or constipation. No melena or hematochezia.  GENITOURINARY: No dysuria, frequency, hematuria, or incontinence  NEUROLOGICAL: No headaches, memory loss, loss of strength, numbness, or tremors  SKIN: No itching, burning, rashes, or lesions   LYMPH Nodes: No enlarged glands  ENDOCRINE: No heat or cold intolerance; No hair loss  MUSCULOSKELETAL: No joint pain or swelling; No muscle, back, or extremity pain  PSYCHIATRIC: No depression, anxiety, mood swings, or difficulty sleeping  HEME/LYMPH: No easy bruising, or bleeding gums  ALLERGY AND IMMUNOLOGIC: No hives or eczema	      PHYSICAL EXAM:  T(C): 36.7 (02-05-23 @ 05:54), Max: 37.4 (02-04-23 @ 20:28)  HR: 78 (02-05-23 @ 08:32) (76 - 80)  BP: 140/84 (02-05-23 @ 05:54) (127/73 - 140/84)  RR: 18 (02-05-23 @ 05:54) (14 - 18)  SpO2: 98% (02-05-23 @ 08:32) (97% - 99%)  Wt(kg): --  I&O's Summary    04 Feb 2023 07:01  -  05 Feb 2023 07:00  --------------------------------------------------------  IN: 360 mL / OUT: 225 mL / NET: 135 mL        Appearance: Normal	  HEENT:   Normal oral mucosa, PERRL, EOMI	  Lymphatic: No lymphadenopathy  Cardiovascular: Normal S1 S2, No JVD, No murmurs, No edema  Respiratory: Lungs clear to auscultation	  Psychiatry: A & O x 3, Mood & affect appropriate  Gastrointestinal:  Soft, Non-tender, + BS	  Skin: No rashes, No ecchymoses, No cyanosis	  Neurologic: Non-focal  Extremities: Normal range of motion, No clubbing, cyanosis or edema  Vascular: Peripheral pulses palpable 2+ bilaterally    MEDICATIONS  (STANDING):  albuterol/ipratropium for Nebulization 3 milliLiter(s) Nebulizer every 6 hours  anastrozole 1 milliGRAM(s) Oral daily  apixaban 5 milliGRAM(s) Oral every 12 hours  artificial  tears Solution 1 Drop(s) Both EYES two times a day  carvedilol 6.25 milliGRAM(s) Oral every 12 hours  chlorhexidine 2% Cloths 1 Application(s) Topical <User Schedule>  insulin lispro (ADMELOG) corrective regimen sliding scale   SubCutaneous every 6 hours  methylPREDNISolone sodium succinate Injectable 40 milliGRAM(s) IV Push daily  pantoprazole  Injectable 40 milliGRAM(s) IV Push daily  simvastatin 20 milliGRAM(s) Oral at bedtime      LABS:	 	                         13.6   8.25  )-----------( 263      ( 05 Feb 2023 07:09 )             43.7     02-05    139  |  99  |  22<H>  ----------------------------<  152<H>  4.2   |  36<H>  |  0.86    Ca    9.2      05 Feb 2023 07:09  Phos  1.8     02-05  Mg     2.5     02-05    TPro  6.4  /  Alb  2.7<L>  /  TBili  0.9  /  DBili  x   /  AST  12  /  ALT  26  /  AlkPhos  95  02-05    proBNP: Serum Pro-Brain Natriuretic Peptide: 3916 pg/mL (01-30 @ 20:20)  Serum Pro-Brain Natriuretic Peptide: 1943 pg/mL (01-11 @ 16:58)

## 2023-02-05 NOTE — PROGRESS NOTE ADULT - PROBLEM SELECTOR PLAN 10
Full code  Palliative care following.   From home family   New BiPAP and O2  Will need home O2 eval  PT reccs AMY 1/31/2023.   CM to follow

## 2023-02-05 NOTE — DIETITIAN INITIAL EVALUATION ADULT - PERTINENT LABORATORY DATA
02-05    139  |  99  |  22<H>  ----------------------------<  152<H>  4.2   |  36<H>  |  0.86    Ca    9.2      05 Feb 2023 07:09  Phos  1.8     02-05  Mg     2.5     02-05    TPro  6.4  /  Alb  2.7<L>  /  TBili  0.9  /  DBili  x   /  AST  12  /  ALT  26  /  AlkPhos  95  02-05  POCT Blood Glucose.: 130 mg/dL (02-05-23 @ 06:12)  A1C with Estimated Average Glucose Result: 7.6 % (02-02-23 @ 06:45)  A1C with Estimated Average Glucose Result: 7.0 % (07-29-22 @ 06:25)

## 2023-02-05 NOTE — DIETITIAN INITIAL EVALUATION ADULT - DIET TYPE
rec. MD/NP team to restrict fluids as needed/DASH/TLC (sodium and cholesterol restricted diet)/consistent carbohydrate (evening snack)/easy to chew

## 2023-02-05 NOTE — DIETITIAN INITIAL EVALUATION ADULT - PHYSCIAL ASSESSMENT
Previous admission ht. 5' 10" & confirmed with pt.  Admission ht. 5' 1"? obese/debilitated/other (specify)

## 2023-02-05 NOTE — PROGRESS NOTE ADULT - ASSESSMENT
Patient is a 89 yo F, from home, with PMHx HTN, COPD, Afib, HFpEF (7/29/22 EF 55-60%, LVH, GIDD, Breast CA, and remote h/o LLE DVT p/w SOB and hypoxia to high 70s, admitted to medicine for AHRF 2/2 Acute diastolic CHF +/- COPD exacerbation and UTI (completed treatment). ICU consulted for concerns of AMS in the setting of Acute on chronic compensated respiratory acidosis requiring new trial of Bipap.  Patient downgraded to AI for further management. Currently on 2L NC. Will need BiPAP Q HS.   2/5 Change to AVAPS setting qHS.

## 2023-02-05 NOTE — PROGRESS NOTE ADULT - ASSESSMENT
as per icu   Patient is a 89 yo F, from home, with PMHx HTN, COPD, Afib, HFpEF (7/29/22 EF 55-60%, LVH, GIDD, Breast CA, and remote h/o LLE DVT p/w SOB and hypoxia to high 70s, admitted to medicine for AHRF 2/2 Acute diastolic CHF +/- COPD exacerbation and UTI (completed treatment). ICU consulted for concerns of AMS in the setting of Acute on chronic compensated respiratory acidosis requiring new trial of Bipap.  Patient downgraded to AI for further management. Currently on 2L NC. Will need BiPAP Q HS.

## 2023-02-05 NOTE — PROGRESS NOTE ADULT - ASSESSMENT
Patient is a 89y old  Female from home, ambulates with RW, with PMHx HTN, COPD, Afib, HFpEF (7/29/22 EF 55-60%, LVH, GIDD, mild pulmonary HTN), Breast CA, and remote h/o LLE DVT, now presents to the ER for evaluation of cough and shortness of breath. She has had dry cough for over a week which became productive and associated with wheezing over the last few days. Pts O2 saturation has been fluctuating in the 80s over the past week and this morning was in the high 70s, per granddaughters.  Pt was taken to PCP Dr. Bianchi's office who sent her to the ER for admission.. On admission, she has no fever, no Leukocytosis, but found to have positive Urine analysis and negative CXR. She has started on Ceftriaxone and The ID consult requested to assist with further evaluation and antibiotic management.    # Metabolic encephalopathy - resolved  # UTI  - Urine Cx grew Klebsiella- s/p completed the course of Ceftriaxone   # Hypoxia- on supplemental oxygenation  # Metabolic encephalopathy  # Hypercapnic Respiratory failure- on BIPAP- in ICU - 2/3/23- transferred out of ICU 2/4/23    would recommend:    1. PT/OOB to chair   2. Management orf BIPAP as per  protocol   3. Diuresis as per Primary team   4. Supplemental oxygenation and bronchodilator as needed  5. Keep Both LE elevated      Attending Attestation:    Spent more than 35 minutes on total encounter, more than 50 % of the visit was spent counseling and/or coordinating care by the Attending physician.  '  Complexity:    # Metabolic encephalopathy - resolved  # UTI  # Hypoxia- on supplemental oxygenation Patient is a 89y old  Female from home, ambulates with RW, with PMHx HTN, COPD, Afib, HFpEF (7/29/22 EF 55-60%, LVH, GIDD, mild pulmonary HTN), Breast CA, and remote h/o LLE DVT, now presents to the ER for evaluation of cough and shortness of breath. She has had dry cough for over a week which became productive and associated with wheezing over the last few days. Pts O2 saturation has been fluctuating in the 80s over the past week and this morning was in the high 70s, per granddaughters.  Pt was taken to PCP Dr. Bianchi's office who sent her to the ER for admission.. On admission, she has no fever, no Leukocytosis, but found to have positive Urine analysis and negative CXR. She has started on Ceftriaxone and The ID consult requested to assist with further evaluation and antibiotic management.    # Metabolic encephalopathy - resolved  # UTI  - Urine Cx grew Klebsiella- s/p completed the course of Ceftriaxone   # Hypoxia- on supplemental oxygenation  # Metabolic encephalopathy  # Hypercapnic Respiratory failure- on BIPAP- in ICU - 2/3/23- transferred out of ICU 2/4/23    would recommend:    1. Monitor OFF Abx as she completed the course  2. Management orf BIPAP as per  protocol   3. Diuresis as per Primary team   4. Supplemental oxygenation and bronchodilator as needed  5. Keep Both LE elevated  6. PT/OOB to chair       Attending Attestation:    Spent more than 35 minutes on total encounter, more than 50 % of the visit was spent counseling and/or coordinating care by the Attending physician.  '  Complexity:    # Metabolic encephalopathy - resolved  # UTI  # Hypoxia- on supplemental oxygenation

## 2023-02-05 NOTE — DIETITIAN INITIAL EVALUATION ADULT - OTHER INFO
Patient from home lives with daughters admitted for hypoxia secondary to COPD exacerbation vs. CHF exacerbation & recently d/luanne as well as down grade from ICU to AI floor on 02/04/23. Visited Pt. alert, weak with NC, states "drank most of liquids on tray", clear liquid diet ordered on 02/04/23, in addition pt. reinforced her food intolerance to "poultry, eggs & shellfish" noted therefore reviewed food preferences & updated kitchen/Diet Tech. Pt. reports usual "appetite" good & daughter takes care of her "dietary" needs, states -300 Lbs? dosing wt. 259.4 Lbs on 02/04/23. On nocturnal BiPAP, Palliative Care team consulted & critical care team following

## 2023-02-05 NOTE — PROGRESS NOTE ADULT - PROBLEM SELECTOR PLAN 1
Acute on chronic respiratory acidosis and  Acute diastolic heart failure.   COPD treated with PO Prednisone 40 mg x 5 days (converted to solumedrol IV 40mg daily).   Lasix decreased to 20 mg IV daily as patient does not appear in fluid overload at this time.  S/p  Bipap/AVAPS trial in ICU.   Patient now on 2-4L NC and  NIPPV QHS.  Continue bronchodilator  Continue IV solumedrol

## 2023-02-05 NOTE — DIETITIAN INITIAL EVALUATION ADULT - FACTORS AFF FOOD INTAKE
weakness, cough, SOB, UTI/difficulty with food procurement/preparation/Lutheran/ethnic/cultural/personal food preferences/other (specify)

## 2023-02-05 NOTE — PROGRESS NOTE ADULT - SUBJECTIVE AND OBJECTIVE BOX
Patient was seen and examined  Patient is a 89y old  Female who presents with a chief complaint of AHRF (05 Feb 2023 10:29)      INTERVAL HPI/OVERNIGHT EVENTS:  T(C): 36.7 (02-05-23 @ 05:54), Max: 37.4 (02-04-23 @ 20:28)  HR: 78 (02-05-23 @ 05:54) (76 - 91)  BP: 140/84 (02-05-23 @ 05:54) (127/73 - 145/68)  RR: 18 (02-05-23 @ 05:54) (14 - 18)  SpO2: 99% (02-05-23 @ 05:54) (97% - 99%)  Wt(kg): --  I&O's Summary    04 Feb 2023 07:01  -  05 Feb 2023 07:00  --------------------------------------------------------  IN: 360 mL / OUT: 225 mL / NET: 135 mL        LABS:                        13.6   8.25  )-----------( 263      ( 05 Feb 2023 07:09 )             43.7     02-05    139  |  99  |  22<H>  ----------------------------<  152<H>  4.2   |  36<H>  |  0.86    Ca    9.2      05 Feb 2023 07:09  Phos  1.8     02-05  Mg     2.5     02-05    TPro  6.4  /  Alb  2.7<L>  /  TBili  0.9  /  DBili  x   /  AST  12  /  ALT  26  /  AlkPhos  95  02-05        CAPILLARY BLOOD GLUCOSE      POCT Blood Glucose.: 130 mg/dL (05 Feb 2023 06:12)  POCT Blood Glucose.: 128 mg/dL (05 Feb 2023 00:23)  POCT Blood Glucose.: 194 mg/dL (04 Feb 2023 17:02)        ABG - ( 04 Feb 2023 03:34 )  pH, Arterial: 7.36  pH, Blood: x     /  pCO2: 75    /  pO2: 111   / HCO3: 42    / Base Excess: 13.4  /  SaO2: 99                    MEDICATIONS  (STANDING):  albuterol/ipratropium for Nebulization 3 milliLiter(s) Nebulizer every 6 hours  anastrozole 1 milliGRAM(s) Oral daily  apixaban 5 milliGRAM(s) Oral every 12 hours  artificial  tears Solution 1 Drop(s) Both EYES two times a day  carvedilol 6.25 milliGRAM(s) Oral every 12 hours  chlorhexidine 2% Cloths 1 Application(s) Topical <User Schedule>  insulin lispro (ADMELOG) corrective regimen sliding scale   SubCutaneous every 6 hours  methylPREDNISolone sodium succinate Injectable 40 milliGRAM(s) IV Push daily  pantoprazole  Injectable 40 milliGRAM(s) IV Push daily  simvastatin 20 milliGRAM(s) Oral at bedtime    MEDICATIONS  (PRN):      RADIOLOGY & ADDITIONAL TESTS:    Imaging Personally Reviewed:  [ ] YES  [ ] NO    REVIEW OF SYSTEMS:  awake confused      Consultant(s) Notes Reviewed:  [ x ] YES  [ ] NO    PHYSICAL EXAM:  GENERAL: NAD, well-groomed, well-developed  HEAD:  Atraumatic, Normocephalic  ENMT: No tonsillar erythema, exudates, or enlargement; Moist mucous membranes, Good dentition, No lesions  NECK: Supple, No JVD, Normal thyroid  NERVOUS SYSTEM:  Alert & Oriented x 1   CHEST/LUNG: bl rhonchi   HEART: Regular rate and rhythm; No murmurs, rubs, or gallops  ABDOMEN: Soft, Nontender, Nondistended; Bowel sounds present  EXTREMITIES: edema   LYMPH: No lymphadenopathy noted  SKIN: No rashes or lesions    Care Discussed with Consultants/Other Providers [ x] YES  [ ] NO

## 2023-02-06 DIAGNOSIS — R06.00 DYSPNEA, UNSPECIFIED: ICD-10-CM

## 2023-02-06 DIAGNOSIS — E44.0 MODERATE PROTEIN-CALORIE MALNUTRITION: ICD-10-CM

## 2023-02-06 LAB
GLUCOSE BLDC GLUCOMTR-MCNC: 148 MG/DL — HIGH (ref 70–99)
GLUCOSE BLDC GLUCOMTR-MCNC: 194 MG/DL — HIGH (ref 70–99)
GLUCOSE BLDC GLUCOMTR-MCNC: 252 MG/DL — HIGH (ref 70–99)
GLUCOSE BLDC GLUCOMTR-MCNC: 270 MG/DL — HIGH (ref 70–99)

## 2023-02-06 PROCEDURE — 99233 SBSQ HOSP IP/OBS HIGH 50: CPT

## 2023-02-06 RX ADMIN — Medication 3 MILLILITER(S): at 20:17

## 2023-02-06 RX ADMIN — APIXABAN 5 MILLIGRAM(S): 2.5 TABLET, FILM COATED ORAL at 17:26

## 2023-02-06 RX ADMIN — Medication 1 PACKET(S): at 06:04

## 2023-02-06 RX ADMIN — CARVEDILOL PHOSPHATE 6.25 MILLIGRAM(S): 80 CAPSULE, EXTENDED RELEASE ORAL at 06:04

## 2023-02-06 RX ADMIN — Medication 1 DROP(S): at 06:05

## 2023-02-06 RX ADMIN — Medication 40 MILLIGRAM(S): at 06:04

## 2023-02-06 RX ADMIN — Medication 1 DROP(S): at 17:27

## 2023-02-06 RX ADMIN — APIXABAN 5 MILLIGRAM(S): 2.5 TABLET, FILM COATED ORAL at 06:04

## 2023-02-06 RX ADMIN — Medication 3 MILLILITER(S): at 09:05

## 2023-02-06 RX ADMIN — ANASTROZOLE 1 MILLIGRAM(S): 1 TABLET ORAL at 12:11

## 2023-02-06 RX ADMIN — Medication 3: at 12:10

## 2023-02-06 RX ADMIN — CARVEDILOL PHOSPHATE 6.25 MILLIGRAM(S): 80 CAPSULE, EXTENDED RELEASE ORAL at 17:27

## 2023-02-06 RX ADMIN — Medication 1 PACKET(S): at 12:11

## 2023-02-06 RX ADMIN — CHLORHEXIDINE GLUCONATE 1 APPLICATION(S): 213 SOLUTION TOPICAL at 06:04

## 2023-02-06 RX ADMIN — SIMVASTATIN 20 MILLIGRAM(S): 20 TABLET, FILM COATED ORAL at 21:43

## 2023-02-06 RX ADMIN — Medication 3 MILLILITER(S): at 14:37

## 2023-02-06 RX ADMIN — Medication 3: at 16:46

## 2023-02-06 RX ADMIN — PANTOPRAZOLE SODIUM 40 MILLIGRAM(S): 20 TABLET, DELAYED RELEASE ORAL at 12:12

## 2023-02-06 RX ADMIN — Medication 3 MILLILITER(S): at 03:27

## 2023-02-06 NOTE — PHYSICAL THERAPY INITIAL EVALUATION ADULT - IMPAIRMENTS CONTRIBUTING IMPAIRED BED MOBILITY, REHAB EVAL
impaired balance/impaired motor control/impaired postural control/decreased strength
decreased strength

## 2023-02-06 NOTE — PHYSICAL THERAPY INITIAL EVALUATION ADULT - DIAGNOSIS, PT EVAL
Pt presented with decreased functional status and difficulty ambulating
Aerobic Loss, Weakness, Impaired Mobility, Unstable Balance

## 2023-02-06 NOTE — PROGRESS NOTE ADULT - ASSESSMENT
89 yo F, from home, with PMHx HTN, COPD, Afib, HFpEF (7/29/22 EF 55-60%, LVH, GIDD, Breast CA, and remote h/o LLE DVT p/w SOB and hypoxia to high 70s, admitted to medicine for AHRF 2/2 Acute diastolic CHF +/- COPD exacerbation and UTI (completed treatment). ICU consulted for concerns of AMS in the setting of Acute on chronic compensated respiratory acidosis requiring new trial of Bipap.  Patient downgraded to AI for further management. Currently on 2L NC. Will need BiPAP Q HS.   2/5 Change to AVAPS setting qHS.

## 2023-02-06 NOTE — PROGRESS NOTE ADULT - ASSESSMENT
89 y/o female with PMHx of COPD, HTN, Afib, HFpEF, (EF 55-60% with LVH, GIDD, mild pulmonary HTN on 7/29/2022), breast CA, remote h/o LLE DVT. CC: dry cough > 1 week and shortness of breath, which became productive and associated with wheezing over the last few days. As per [facility], O2 saturation has been fluctuating in the 80s over the past week and the day she came to the ED, was in the high 70. She was taken to PCP Dr. Bianchi's office who sent her to the ED for admission.     Patient was admitted on 1/30/2023 due to AHRF (SOB and hypoxia to high 70s) 2/2 Acute diastolic CHF +/- COPD exacerbation and UTI. ICU consulted 2/3/2023 for concerns of AMS.     89 y/o female with PMHx of COPD, HTN, Afib, HFpEF, (EF 55-60% with LVH, GIDD, mild pulmonary HTN on 7/29/2022), breast CA, remote h/o LLE DVT. CC: dry cough > 1 week and shortness of breath, which became productive and associated with wheezing over the last few days. As per [facility], O2 saturation has been fluctuating in the 80s over the past week and the day she came to the ED, was in the high 70. She was taken to PCP Dr. Bianchi's office who sent her to the ED for admission.     Patient was admitted on 1/30/2023 due to AHRF (SOB and hypoxia to high 70s) 2/2 Acute diastolic CHF +/- COPD exacerbation and UTI. ICU consulted 2/3/2023 for concerns of AMS. Hypercapnic Respiratory failure (on BIPAP- in ICU, 2/3/2023), and downgraded to 4 North on 2/4/2023. Now tolerating well Oxygen via nasal cannula.

## 2023-02-06 NOTE — PROGRESS NOTE ADULT - ASSESSMENT
as per icu   Patient is a 89 yo F, from home, with PMHx HTN, COPD, Afib, HFpEF (7/29/22 EF 55-60%, LVH, GIDD, Breast CA, and remote h/o LLE DVT p/w SOB and hypoxia to high 70s, admitted to medicine for AHRF 2/2 Acute diastolic CHF +/- COPD exacerbation and UTI (completed treatment). ICU consulted for concerns of AMS in the setting of Acute on chronic compensated respiratory acidosis requiring new trial of Bipap.  Patient downgraded to AI for further management. Currently on 2L NC. Will need BiPAP Q HS.

## 2023-02-06 NOTE — PROGRESS NOTE ADULT - NUTRITIONAL ASSESSMENT
PLAN/INTERVENTION:  Obtain Daily Weight	yes   Obtain Weekly Weight	yes   Change Diet To	rec. MD/NP team to restrict fluids as needed; easy to chew; consistent carbohydrate (evening snack); DASH/TLC (sodium and cholesterol restricted diet)  Oral Nutrition Supplements	Add Glucerna Shake 1 serving BID (440kcal, 20g protein)   Feeding Assistance	Nursing to continue meal set up and encouragement with aspiration precaution   RD To Remain Available	yes   RD Name and Phone # Left With Pt/Family for Necessary Followup	Add MVI/minerals daily   Factors Influencing Readiness to Learn	fatigue; pain  Dietary Modifications	not applicable     Electronic Signatures:  Jamia Wilson (Registered Dietitian)  (Signed 05-Feb-2023 11:35)

## 2023-02-06 NOTE — PROGRESS NOTE ADULT - SUBJECTIVE AND OBJECTIVE BOX
follow up on:  complex medical decision making related to goals of care    [facility] Geriatric and Palliative Consult Service:  Dr. Danielle Monae: cell ([phone])  Dr. Emiliana Driscoll: cell ([phone])  Dulce Maria Hand DNP: cell [phone])  Han Markham NP: cell [phone])   Muna Collins NP: GERTRUDIS Reich LMSW: cell ([phone])     OVERNIGHT EVENTS:    Present Symptoms:   Pain:   Fatigue:  Nausea:  Lack of Appetite:   SOB:  Depression:  Anxiety:  Review of Systems: [All others negative or Unable to obtain due to poor mentation]    MEDICATIONS  (STANDING):  albuterol/ipratropium for Nebulization 3 milliLiter(s) Nebulizer every 6 hours  anastrozole 1 milliGRAM(s) Oral daily  apixaban 5 milliGRAM(s) Oral every 12 hours  artificial  tears Solution 1 Drop(s) Both EYES two times a day  carvedilol 6.25 milliGRAM(s) Oral every 12 hours  chlorhexidine 2% Cloths 1 Application(s) Topical <User Schedule>  insulin lispro (ADMELOG) corrective regimen sliding scale   SubCutaneous three times a day with meals  insulin lispro (ADMELOG) corrective regimen sliding scale   SubCutaneous at bedtime  pantoprazole  Injectable 40 milliGRAM(s) IV Push daily  simvastatin 20 milliGRAM(s) Oral at bedtime    MEDICATIONS  (PRN):  guaiFENesin Oral Liquid (Sugar-Free) 200 milliGRAM(s) Oral every 6 hours PRN Cough      PHYSICAL EXAM:  Vital Signs Last 24 Hrs  T(C): 36.5 (06 Feb 2023 17:24), Max: 36.9 (06 Feb 2023 14:16)  T(F): 97.7 (06 Feb 2023 17:24), Max: 98.5 (06 Feb 2023 14:16)  HR: 69 (06 Feb 2023 17:24) (67 - 93)  BP: 125/70 (06 Feb 2023 17:24) (116/72 - 141/86)  BP(mean): --  RR: 20 (06 Feb 2023 17:24) (14 - 20)  SpO2: 97% (06 Feb 2023 17:24) (94% - 100%)    Parameters below as of 06 Feb 2023 17:24  Patient On (Oxygen Delivery Method): nasal cannula  O2 Flow (L/min): 2      General: alert  oriented x ____    lethargic distressed cachexia  verbal nonverbal  unarousable     Palliative Performance Scale/Karnofsky Score:  ECOG Performance:    HEENT: no abnormal lesion, dry mouth  ET tube/trach oral lesions:  Lungs: tachypnea/labored breathing, audible excessive secretions  CV: RRR, S1S2, tachycardia  GI: soft non distended non tender  incontinent               PEG/NG/OG tube  constipation  last BM:   : incontinent  oliguria/anuria  long  Musculoskeletal: weakness x4 edema x4    ambulatory with assistance   mostly/fully bedbound/wheelchair bound  Skin: no abnormal skin lesions, poor skin turgor, pressure ulcers stage:   Neuro: no deficits, mild cognitive impairment dsyphagia/dysarthria paresis  Oral intake ability: unable/only mouth care, minimal moderate full capability    LABS:                          13.6   8.25  )-----------( 263      ( 05 Feb 2023 07:09 )             43.7     02-05    139  |  99  |  22<H>  ----------------------------<  152<H>  4.2   |  36<H>  |  0.86    Ca    9.2      05 Feb 2023 07:09  Phos  1.8     02-05  Mg     2.5     02-05    TPro  6.4  /  Alb  2.7<L>  /  TBili  0.9  /  DBili  x   /  AST  12  /  ALT  26  /  AlkPhos  95  02-05        RADIOLOGY & ADDITIONAL STUDIES: follow up on:  complex medical decision making related to goals of care    [facility] Geriatric and Palliative Consult Service:  Dr. Danielle Monae: cell ([phone])  Dr. Emiliana Driscoll: cell ([phone])  Dulce Maria Hand DNP: cell [phone])  Han Markham NP: cell ([phone])   Muna Collins NP: GERTRUDIS Reich LMSW: cell ([phone])     INTERIM HX:  Palliative Care follow up today to re-assess patient's comfort level and any GOC changes.     OVERNIGHT EVENTS: no events    Present Symptoms: two daughters at bedside, including main caregiver, Toña   Pain: denies   Fatigue: moderate  Nausea: denies  Lack of Appetite: denies   SOB: denied. Feeling comfortable with O2 2LPM via NC  Depression: denied feeling sad  Anxiety: denies    Review of Systems: [All others negative]    MEDICATIONS  (STANDING):  albuterol/ipratropium for Nebulization 3 milliLiter(s) Nebulizer every 6 hours  anastrozole 1 milliGRAM(s) Oral daily  apixaban 5 milliGRAM(s) Oral every 12 hours  artificial  tears Solution 1 Drop(s) Both EYES two times a day  carvedilol 6.25 milliGRAM(s) Oral every 12 hours  chlorhexidine 2% Cloths 1 Application(s) Topical <User Schedule>  insulin lispro (ADMELOG) corrective regimen sliding scale   SubCutaneous three times a day with meals  insulin lispro (ADMELOG) corrective regimen sliding scale   SubCutaneous at bedtime  pantoprazole  Injectable 40 milliGRAM(s) IV Push daily  simvastatin 20 milliGRAM(s) Oral at bedtime    MEDICATIONS  (PRN):  guaiFENesin Oral Liquid (Sugar-Free) 200 milliGRAM(s) Oral every 6 hours PRN Cough      PHYSICAL EXAM:  Vital Signs Last 24 Hrs  T(C): 36.5 (06 Feb 2023 17:24), Max: 36.9 (06 Feb 2023 14:16)  T(F): 97.7 (06 Feb 2023 17:24), Max: 98.5 (06 Feb 2023 14:16)  HR: 69 (06 Feb 2023 17:24) (67 - 93)  BP: 125/70 (06 Feb 2023 17:24) (116/72 - 141/86)  BP(mean): --  RR: 20 (06 Feb 2023 17:24) (14 - 20)  SpO2: 97% (06 Feb 2023 17:24) (94% - 100%)    Parameters below as of 06 Feb 2023 17:24  Patient On (Oxygen Delivery Method): nasal cannula  O2 Flow (L/min): 2      General: appears chronically ill, comfortable, morbid obese, in NAD     Palliative Performance Scale/Karnofsky Score: 30%  ECOG Performance: 3/4    HEENT:  no temporal wasting, sclerae clear, legally blind, dry mouth  Lungs: unlabored breathing, breathing comfortably on nasal cannula (as above)  CV: RRR, S1S2  GI: soft, morbid obese abdomen, non tender, functionally incontinent. Last BM: today  : incontinent, purewick system in place  Musculoskeletal: weakness x4, edema x4, chair bound  Skin: warm, dry, poor skin turgor  Neuro: A&O x 1, verbal  Oral intake ability: eating regular diet (DASH/TLC), minimal capability    LABS:                          13.6   8.25  )-----------( 263      ( 05 Feb 2023 07:09 )             43.7     02-05    139  |  99  |  22<H>  ----------------------------<  152<H>  4.2   |  36<H>  |  0.86    Ca    9.2      05 Feb 2023 07:09  Phos  1.8     02-05  Mg     2.5     02-05    TPro  6.4  /  Alb  2.7<L>  /  TBili  0.9  /  DBili  x   /  AST  12  /  ALT  26  /  AlkPhos  95  02-05  Blood Gas Profile - Venous (02.04.23 @ 14:30)   pH, Venous: 7.30   pCO2, Venous: 84 mmHg   pO2, Venous: 32 mmHg   HCO3, Venous: 41 mmol/L   Base Excess, Venous: 11.2 mmol/L   Oxygen Saturation, Venous: 39.1 %   FIO2, Venous: 28.0     RADIOLOGY & ADDITIONAL STUDIES:    NO new imaging follow up on:  complex medical decision making related to goals of care    [facility] Geriatric and Palliative Consult Service:  Dr. Danielle Monae: cell ([phone])  Dr. Emiliana Driscoll: cell ([phone])  Dulce Maria Hand DNP: cell ([phone])  Han Markham NP: cell ([phone])   Muna Collins NP: GERTRUDIS Reich LMSW: cell ([phone])     INTERIM HX:  Palliative Care follow up today to re-assess patient's comfort level and any GOC changes.     OVERNIGHT EVENTS: no events    Present Symptoms: two daughters at bedside, including main caregiver, Toña   Pain: denies   Fatigue: moderate  Nausea: denies  Lack of Appetite: denies   SOB: denied. Feeling comfortable with O2 2LPM via NC  Depression: denied feeling sad  Anxiety: denies    Review of Systems: [All others negative]    MEDICATIONS  (STANDING):  albuterol/ipratropium for Nebulization 3 milliLiter(s) Nebulizer every 6 hours  anastrozole 1 milliGRAM(s) Oral daily  apixaban 5 milliGRAM(s) Oral every 12 hours  artificial  tears Solution 1 Drop(s) Both EYES two times a day  carvedilol 6.25 milliGRAM(s) Oral every 12 hours  chlorhexidine 2% Cloths 1 Application(s) Topical <User Schedule>  insulin lispro (ADMELOG) corrective regimen sliding scale   SubCutaneous three times a day with meals  insulin lispro (ADMELOG) corrective regimen sliding scale   SubCutaneous at bedtime  pantoprazole  Injectable 40 milliGRAM(s) IV Push daily  simvastatin 20 milliGRAM(s) Oral at bedtime    MEDICATIONS  (PRN):  guaiFENesin Oral Liquid (Sugar-Free) 200 milliGRAM(s) Oral every 6 hours PRN Cough      PHYSICAL EXAM:  Vital Signs Last 24 Hrs  T(C): 36.5 (06 Feb 2023 17:24), Max: 36.9 (06 Feb 2023 14:16)  T(F): 97.7 (06 Feb 2023 17:24), Max: 98.5 (06 Feb 2023 14:16)  HR: 69 (06 Feb 2023 17:24) (67 - 93)  BP: 125/70 (06 Feb 2023 17:24) (116/72 - 141/86)  BP(mean): --  RR: 20 (06 Feb 2023 17:24) (14 - 20)  SpO2: 97% (06 Feb 2023 17:24) (94% - 100%)    Parameters below as of 06 Feb 2023 17:24  Patient On (Oxygen Delivery Method): nasal cannula  O2 Flow (L/min): 2      General: appears chronically ill, comfortable, morbid obese, in NAD     Palliative Performance Scale/Karnofsky Score: 30%  ECOG Performance: 3/4    HEENT:  no temporal wasting, sclerae clear, legally blind, dry mouth  Lungs: unlabored breathing, breathing comfortably on nasal cannula (as above)  CV: RRR, S1S2  GI: soft, morbid obese abdomen, non tender, functionally incontinent. Last BM: today  : incontinent, purewick system in place  Musculoskeletal: weakness x4, edema x4, chair bound  Skin: warm, dry, poor skin turgor  Neuro: A&O x 1, verbal  Oral intake ability: eating regular diet (DASH/TLC) with supplementation, minimal capability    LABS:                          13.6   8.25  )-----------( 263      ( 05 Feb 2023 07:09 )             43.7     02-05    139  |  99  |  22<H>  ----------------------------<  152<H>  4.2   |  36<H>  |  0.86    Ca    9.2      05 Feb 2023 07:09  Phos  1.8     02-05  Mg     2.5     02-05    TPro  6.4  /  Alb  2.7<L>  /  TBili  0.9  /  DBili  x   /  AST  12  /  ALT  26  /  AlkPhos  95  02-05  Blood Gas Profile - Venous (02.04.23 @ 14:30)   pH, Venous: 7.30   pCO2, Venous: 84 mmHg   pO2, Venous: 32 mmHg   HCO3, Venous: 41 mmol/L   Base Excess, Venous: 11.2 mmol/L   Oxygen Saturation, Venous: 39.1 %   FIO2, Venous: 28.0     RADIOLOGY & ADDITIONAL STUDIES:    NO new imaging

## 2023-02-06 NOTE — PROGRESS NOTE ADULT - PROBLEM SELECTOR PLAN 3
Continue oxygen support during the day and AVAPS HS  ABG in am if hypercapnic will order AVAPS-AE for the patient to use at home.  D/C solumedrol and start prednisone tomorrow.  Continue bronchodilators

## 2023-02-06 NOTE — PHYSICAL THERAPY INITIAL EVALUATION ADULT - ACTIVE RANGE OF MOTION EXAMINATION, REHAB EVAL
bilateral upper extremity Active ROM was WFL (within functional limits)/bilateral  lower extremity Active ROM was WFL (within functional limits)
grossly assessed WFL AAROM/bilateral upper extremity Active ROM was WFL (within functional limits)/bilateral  lower extremity Active ROM was WFL (within functional limits)

## 2023-02-06 NOTE — PROGRESS NOTE ADULT - ASSESSMENT
Patient is a 89y old  Female from home, ambulates with RW, with PMHx HTN, COPD, Afib, HFpEF (7/29/22 EF 55-60%, LVH, GIDD, mild pulmonary HTN), Breast CA, and remote h/o LLE DVT, now presents to the ER for evaluation of cough and shortness of breath. She has had dry cough for over a week which became productive and associated with wheezing over the last few days. Pts O2 saturation has been fluctuating in the 80s over the past week and this morning was in the high 70s, per granddaughters.  Pt was taken to PCP Dr. Bianchi's office who sent her to the ER for admission.. On admission, she has no fever, no Leukocytosis, but found to have positive Urine analysis and negative CXR. She has started on Ceftriaxone and The ID consult requested to assist with further evaluation and antibiotic management.    # Metabolic encephalopathy - resolved  # UTI  - Urine Cx grew Klebsiella- s/p completed the course of Ceftriaxone   # Hypoxia- on supplemental oxygenation  # Metabolic encephalopathy  # Hypercapnic Respiratory failure- on BIPAP- in ICU - 2/3/23- transferred out of ICU 2/4/23    would recommend:    1. Monitor OFF Abx as she completed the course  2. Management orf BIPAP as per  protocol   3. Diuresis as per Primary team   4. Supplemental oxygenation and bronchodilator as needed  5. Keep Both LE elevated  6. PT/OOB to chair       Attending Attestation:    Spent more than 35 minutes on total encounter, more than 50 % of the visit was spent counseling and/or coordinating care by the Attending physician.  '  Complexity:    # Metabolic encephalopathy - resolved  # UTI  # Hypoxia- on supplemental oxygenation   Patient is a 89y old  Female from home, ambulates with RW, with PMHx HTN, COPD, Afib, HFpEF (7/29/22 EF 55-60%, LVH, GIDD, mild pulmonary HTN), Breast CA, and remote h/o LLE DVT, now presents to the ER for evaluation of cough and shortness of breath. She has had dry cough for over a week which became productive and associated with wheezing over the last few days. Pts O2 saturation has been fluctuating in the 80s over the past week and this morning was in the high 70s, per granddaughters.  Pt was taken to PCP Dr. Bianchi's office who sent her to the ER for admission.. On admission, she has no fever, no Leukocytosis, but found to have positive Urine analysis and negative CXR. She has started on Ceftriaxone and The ID consult requested to assist with further evaluation and antibiotic management.    # Metabolic encephalopathy - resolved  # UTI  - Urine Cx grew Klebsiella- s/p completed the course of Ceftriaxone   # Hypoxia- on supplemental oxygenation  # Metabolic encephalopathy  # Hypercapnic Respiratory failure- on BIPAP- in ICU - 2/3/23- transferred out of ICU 2/4/23    would recommend:    1. Advanced diet as tolerated  2. Monitor OFF Abx as she completed the course  3. Management orf BIPAP as per  protocol   4. Diuresis as per Primary team   5. Supplemental oxygenation and bronchodilator as needed  6. PT/OOB to chair     d/w covering NP, Lucia and family at the bed side regarding treatment plan and prognosis,     Attending Attestation:    Spent more than 35 minutes on total encounter, more than 50 % of the visit was spent counseling and/or coordinating care by the Attending physician.  '  Complexity:    # Metabolic encephalopathy - resolved  # UTI  # Hypoxia- on supplemental oxygenation

## 2023-02-06 NOTE — PHYSICAL THERAPY INITIAL EVALUATION ADULT - IMPAIRMENTS FOUND, PT EVAL
aerobic capacity/endurance/gait, locomotion, and balance
aerobic capacity/endurance/gait, locomotion, and balance/gross motor/muscle strength/posture/ventilation and respiration/gas exchange

## 2023-02-06 NOTE — PROGRESS NOTE ADULT - SUBJECTIVE AND OBJECTIVE BOX
NUNO LAST    SCU progress note    INTERVAL HPI/OVERNIGHT EVENTS: ***No overnight events    DNR [ ]   DNI  [  ]   FULL CODE    Covid - 19 PCR: Negative 2/3    The 4Ms    What Matters Most: see GOC  Age appropriate Medications/Screen for High Risk Medication: Yes  Mentation: see CAM below  Mobility: defer to physical exam    The Confusion Assessment Method (CAM) Diagnostic Algorithm     1: Acute Onset or Fluctuating Course  - Is there evidence of an acute change in mental status from the patient’s baseline? Did the (abnormal) behavior  fluctuate during the day, that is, tend to come and go, or increase and decrease in severity?  [ ] YES [x ] NO     2: Inattention  - Did the patient have difficulty focusing attention, being easily distractible, or having difficulty keeping track of what was being said?  [ ] YES [x ] NO     3: Disorganized thinking  -Was the patient’s thinking disorganized or incoherent, such as rambling or irrelevant conversation, unclear or illogical flow of ideas, or unpredictable switching from subject to subject?  [ ] YES [x ] NO    4: Altered Level of consciousness?  [ ] YES [x ] NO    The diagnosis of delirium by CAM requires the presence of features 1 and 2 and either 3 or 4.    PRESSORS: [ ] YES [x ] NO    Cardiovascular:  Heart Failure  Acute   Acute on Chronic  Chronic       carvedilol 6.25 milliGRAM(s) Oral every 12 hours    Pulmonary:  albuterol/ipratropium for Nebulization 3 milliLiter(s) Nebulizer every 6 hours  guaiFENesin Oral Liquid (Sugar-Free) 200 milliGRAM(s) Oral every 6 hours PRN    Hematalogic:  apixaban 5 milliGRAM(s) Oral every 12 hours    Other:  anastrozole 1 milliGRAM(s) Oral daily  artificial  tears Solution 1 Drop(s) Both EYES two times a day  chlorhexidine 2% Cloths 1 Application(s) Topical <User Schedule>  insulin lispro (ADMELOG) corrective regimen sliding scale   SubCutaneous three times a day with meals  insulin lispro (ADMELOG) corrective regimen sliding scale   SubCutaneous at bedtime  methylPREDNISolone sodium succinate Injectable 40 milliGRAM(s) IV Push daily  pantoprazole  Injectable 40 milliGRAM(s) IV Push daily  simvastatin 20 milliGRAM(s) Oral at bedtime    albuterol/ipratropium for Nebulization 3 milliLiter(s) Nebulizer every 6 hours  anastrozole 1 milliGRAM(s) Oral daily  apixaban 5 milliGRAM(s) Oral every 12 hours  artificial  tears Solution 1 Drop(s) Both EYES two times a day  carvedilol 6.25 milliGRAM(s) Oral every 12 hours  chlorhexidine 2% Cloths 1 Application(s) Topical <User Schedule>  guaiFENesin Oral Liquid (Sugar-Free) 200 milliGRAM(s) Oral every 6 hours PRN  insulin lispro (ADMELOG) corrective regimen sliding scale   SubCutaneous three times a day with meals  insulin lispro (ADMELOG) corrective regimen sliding scale   SubCutaneous at bedtime  methylPREDNISolone sodium succinate Injectable 40 milliGRAM(s) IV Push daily  pantoprazole  Injectable 40 milliGRAM(s) IV Push daily  simvastatin 20 milliGRAM(s) Oral at bedtime    Drug Dosing Weight  Height (cm): 154.9 (30 Jan 2023 16:23)  Weight (kg): 120 (03 Feb 2023 15:15)  BMI (kg/m2): 50 (03 Feb 2023 15:15)  BSA (m2): 2.13 (03 Feb 2023 15:15)    CENTRAL LINE: [ ] YES [x ] NO  LOCATION:   DATE INSERTED:  REMOVE: [ ] YES [ ] NO  EXPLAIN:    AGUIRRE: [ ] YES [x ] NO    DATE INSERTED:  REMOVE:  [ ] YES [ ] NO  EXPLAIN:    PAST MEDICAL & SURGICAL HISTORY:  Arthritis      Legally blind      Pre-diabetes      Breast cancer  right, no chemo or radiation      COPD exacerbation      Atrial fibrillation      HF (heart failure)  HFpEF 7/29      HTN (hypertension)      Deep vein thrombosis (DVT)  Left Lower Extremity      H/O CHF      No significant past surgical history                        PHYSICAL EXAM:    GENERAL: NAD, morbidly obese.  HEAD:  Atraumatic, Normocephalic  EYES: EOMI, PERRLA  ENMT: No tonsillar erythema, exudates  NECK: Supple, No JVD  NERVOUS SYSTEM:  Alert & Oriented X3, Follows commands, moving all extremities.  CHEST/LUNG: Diminished breath sounds bilateral bases. NO crackles or wheezing noted.  HEART: Regular rate and rhythm; No murmurs, rubs, or gallops  ABDOMEN: Soft, Obese. Nontender, Nondistended; Bowel sounds present  EXTREMITIES:  2+ Peripheral Pulses, No clubbing, cyanosis, or edema  LYMPH: No lymphadenopathy noted  SKIN: No rashes or lesions      LABS:  CBC Full  -  ( 05 Feb 2023 07:09 )  WBC Count : 8.25 K/uL  RBC Count : 4.32 M/uL  Hemoglobin : 13.6 g/dL  Hematocrit : 43.7 %  Platelet Count - Automated : 263 K/uL  Mean Cell Volume : 101.2 fl  Mean Cell Hemoglobin : 31.5 pg  Mean Cell Hemoglobin Concentration : 31.1 gm/dL  Auto Neutrophil # : 6.05 K/uL  Auto Lymphocyte # : 1.37 K/uL  Auto Monocyte # : 0.55 K/uL  Auto Eosinophil # : 0.24 K/uL  Auto Basophil # : 0.01 K/uL  Auto Neutrophil % : 73.3 %  Auto Lymphocyte % : 16.6 %  Auto Monocyte % : 6.7 %  Auto Eosinophil % : 2.9 %  Auto Basophil % : 0.1 %    02-05    139  |  99  |  22<H>  ----------------------------<  152<H>  4.2   |  36<H>  |  0.86    Ca    9.2      05 Feb 2023 07:09  Phos  1.8     02-05  Mg     2.5     02-05    TPro  6.4  /  Alb  2.7<L>  /  TBili  0.9  /  DBili  x   /  AST  12  /  ALT  26  /  AlkPhos  95  02-05              [  ]  DVT Prophylaxis  [  ]  Nutrition, Brand, Rate         Goal Rate        Abnormal Nutritional Status -  Malnutrition   Cachexia      Morbid Obesity BMI >/=40    RADIOLOGY & ADDITIONAL STUDIES:  ***  < from: CT Chest No Cont (01.31.23 @ 16:36) >  FINDINGS:    Image quality degraded due to extensive respiratory motion.    AIRWAYS, LUNGS, PLEURA: Trachea and mainstem bronchi patent. Biapical   scarring unchanged. Bibasilar atelectasis. No focal lung consolidation.   No pleural effusion.    MEDIASTINUM: Cardiomegaly. No pericardial effusion. Thoracic aorta normal   caliber.  No large mediastinal lymph nodes.    IMAGED ABDOMEN: Unremarkable.    SOFT TISSUES: Unremarkable.    BONES: Unremarkable.      IMPRESSION:.    No focal lung consolidation.    Bibasilar atelectasis.    --- End of Report ---        < end of copied text >    Goals of Care Discussion with Family/Proxy/Other   - see note from 2/04/23

## 2023-02-06 NOTE — PROGRESS NOTE ADULT - PROBLEM SELECTOR PLAN 2
Takes Coreg 6.125 mg BID and Lasix 40 mg BID at home.   ECHO July 2022- EF 55-60%, LVH, GIDD, mild pulm HTN  pro-BNP 3916 (prev 1943)  S/p diuresis in ICU  s/p  Lasix 20 mg daily IV  Daily weight   Dr Siu following

## 2023-02-06 NOTE — PHYSICAL THERAPY INITIAL EVALUATION ADULT - PLANNED THERAPY INTERVENTIONS, PT EVAL
bed mobility training/strengthening/transfer training
balance training/bed mobility training/gait training/strengthening/transfer training

## 2023-02-06 NOTE — PROGRESS NOTE ADULT - PROBLEM SELECTOR PLAN 1
worsened by AHRF 2/2 to acute on chronic congestive heart failure, COPD exacerbation, morbid obesity. Associated with occasional cough (improved)    Recommendations:  Evaluation for Home Oxygen  Elevate the head of the bed 30-60 degrees to improved SOB/dyspnea  c/w albuterol/ipratropium for Nebulization 3 milliLiter(s) Nebulizer every 6 hours  c/w guaiFENesin Oral Liquid (Sugar-Free) 200 milliGRAM(s) Oral every 6 hours PRN Cough  Follow up with PCP within 1-2 weeks after discharge worsened by AHRF 2/2 to acute on chronic congestive heart failure, COPD exacerbation, morbid obesity. Associated with occasional cough (improved)    Recommendations:  As per Home Oxygen evaluation (2/3/2023) will require "home and portable oxygen"   Elevate the head of the bed 30-60 degrees to improved SOB/dyspnea  c/w albuterol/ipratropium for Nebulization 3 milliLiter(s) Nebulizer every 6 hours  c/w guaiFENesin Oral Liquid (Sugar-Free) 200 milliGRAM(s) Oral every 6 hours PRN Cough  Follow up with PCP within 1-2 weeks after discharge

## 2023-02-06 NOTE — PROGRESS NOTE ADULT - PROBLEM SELECTOR PLAN 1
Acute on chronic respiratory acidosis and  Acute diastolic heart failure.   COPD treated with PO Prednisone 40 mg x 5 days (converted to solumedrol IV 40mg daily).   Lasix decreased to 20 mg IV daily as patient does not appear in fluid overload at this time.  S/p  Bipap/AVAPS trial in ICU.   Patient now on 2-4L NC and  NIPPV QHS.  Continue bronchodilator  D/C solumedrol and start prednisone with taper.

## 2023-02-06 NOTE — PROGRESS NOTE ADULT - SUBJECTIVE AND OBJECTIVE BOX
Patient was seen and examined  Patient is a 89y old  Female who presents with a chief complaint of AHRF (06 Feb 2023 13:55)      INTERVAL HPI/OVERNIGHT EVENTS:  T(C): 36.9 (02-06-23 @ 14:16), Max: 36.9 (02-06-23 @ 14:16)  HR: 78 (02-06-23 @ 14:16) (67 - 93)  BP: 130/68 (02-06-23 @ 14:16) (116/72 - 141/86)  RR: 18 (02-06-23 @ 14:16) (14 - 19)  SpO2: 96% (02-06-23 @ 14:16) (94% - 100%)  Wt(kg): --  I&O's Summary      LABS:                        13.6   8.25  )-----------( 263      ( 05 Feb 2023 07:09 )             43.7     02-05    139  |  99  |  22<H>  ----------------------------<  152<H>  4.2   |  36<H>  |  0.86    Ca    9.2      05 Feb 2023 07:09  Phos  1.8     02-05  Mg     2.5     02-05    TPro  6.4  /  Alb  2.7<L>  /  TBili  0.9  /  DBili  x   /  AST  12  /  ALT  26  /  AlkPhos  95  02-05        CAPILLARY BLOOD GLUCOSE      POCT Blood Glucose.: 252 mg/dL (06 Feb 2023 12:00)  POCT Blood Glucose.: 148 mg/dL (06 Feb 2023 07:36)  POCT Blood Glucose.: 181 mg/dL (05 Feb 2023 21:04)  POCT Blood Glucose.: 209 mg/dL (05 Feb 2023 16:53)              MEDICATIONS  (STANDING):  albuterol/ipratropium for Nebulization 3 milliLiter(s) Nebulizer every 6 hours  anastrozole 1 milliGRAM(s) Oral daily  apixaban 5 milliGRAM(s) Oral every 12 hours  artificial  tears Solution 1 Drop(s) Both EYES two times a day  carvedilol 6.25 milliGRAM(s) Oral every 12 hours  chlorhexidine 2% Cloths 1 Application(s) Topical <User Schedule>  insulin lispro (ADMELOG) corrective regimen sliding scale   SubCutaneous three times a day with meals  insulin lispro (ADMELOG) corrective regimen sliding scale   SubCutaneous at bedtime  methylPREDNISolone sodium succinate Injectable 40 milliGRAM(s) IV Push daily  pantoprazole  Injectable 40 milliGRAM(s) IV Push daily  simvastatin 20 milliGRAM(s) Oral at bedtime    MEDICATIONS  (PRN):  guaiFENesin Oral Liquid (Sugar-Free) 200 milliGRAM(s) Oral every 6 hours PRN Cough      RADIOLOGY & ADDITIONAL TESTS:    Imaging Personally Reviewed:  [ ] YES  [ ] NO    REVIEW OF SYSTEMS:  denied      Consultant(s) Notes Reviewed:  [ x ] YES  [ ] NO    PHYSICAL EXAM:  GENERAL: NAD, well-groomed, well-developed  HEAD:  Atraumatic, Normocephalic  EYES: blind   ENMT: No tonsillar erythema, exudates, or enlargement; Moist mucous membranes, Good dentition, No lesions  NECK: Supple, No JVD, Normal thyroid  NERVOUS SYSTEM:  Alert & Oriented X3, Good concentration; Motor Strength 5/5 B/L upper and lower extremities; DTRs 2+ intact and symmetric  CHEST/LUNG: Clear to percussion bilaterally; No rales, rhonchi, wheezing, or rubs  HEART: Regular rate and rhythm; No murmurs, rubs, or gallops  ABDOMEN: Soft, Nontender, Nondistended; Bowel sounds present  EXTREMITIES:  2+ Peripheral Pulses, No clubbing, cyanosis, or edema  LYMPH: No lymphadenopathy noted  SKIN: No rashes or lesions    Care Discussed with Consultants/Other Providers [ x] YES  [ ] NO

## 2023-02-06 NOTE — PHYSICAL THERAPY INITIAL EVALUATION ADULT - LEVEL OF INDEPENDENCE: BED TO CHAIR, REHAB EVAL
die to gen weakness, requires rest periods due decreased endurance/unable to perform
to be assess pending improvement in trunk control

## 2023-02-06 NOTE — PROGRESS NOTE ADULT - SUBJECTIVE AND OBJECTIVE BOX
CHIEF COMPLAINT:Patient is a 89y old  Female who presents with a chief complaint of AHRF.Pt appears comfortable.    	  REVIEW OF SYSTEMS:  CONSTITUTIONAL: No fever, weight loss, or fatigue  EYES: No eye pain, visual disturbances, or discharge  ENT:  No difficulty hearing, tinnitus, vertigo; No sinus or throat pain  NECK: No pain or stiffness  RESPIRATORY: No cough, wheezing, chills or hemoptysis; No Shortness of Breath  CARDIOVASCULAR: No chest pain, palpitations, passing out, dizziness, or leg swelling  GASTROINTESTINAL: No abdominal or epigastric pain. No nausea, vomiting, or hematemesis; No diarrhea or constipation. No melena or hematochezia.  GENITOURINARY: No dysuria, frequency, hematuria, or incontinence  NEUROLOGICAL: No headaches, memory loss, loss of strength, numbness, or tremors  SKIN: No itching, burning, rashes, or lesions   LYMPH Nodes: No enlarged glands  ENDOCRINE: No heat or cold intolerance; No hair loss  MUSCULOSKELETAL: No joint pain or swelling; No muscle, back, or extremity pain  PSYCHIATRIC: No depression, anxiety, mood swings, or difficulty sleeping  HEME/LYMPH: No easy bruising, or bleeding gums  ALLERGY AND IMMUNOLOGIC: No hives or eczema	      PHYSICAL EXAM:  T(C): 36.6 (02-06-23 @ 05:12), Max: 37.1 (02-05-23 @ 12:17)  HR: 93 (02-06-23 @ 05:12) (67 - 93)  BP: 141/86 (02-06-23 @ 05:12) (116/72 - 141/86)  RR: 14 (02-06-23 @ 05:12) (14 - 19)  SpO2: 94% (02-06-23 @ 05:12) (94% - 100%)  Wt(kg): --  I&O's Summary      Appearance: Normal	  HEENT:   Normal oral mucosa, PERRL, EOMI	  Lymphatic: No lymphadenopathy  Cardiovascular: Normal S1 S2, No JVD, No murmurs, No edema  Respiratory: Lungs clear to auscultation	  Psychiatry: A & O x 3, Mood & affect appropriate  Gastrointestinal:  Soft, Non-tender, + BS	  Skin: No rashes, No ecchymoses, No cyanosis	  Neurologic: Non-focal  Extremities: Normal range of motion, No clubbing, cyanosis or edema  Vascular: Peripheral pulses palpable 2+ bilaterally    MEDICATIONS  (STANDING):  albuterol/ipratropium for Nebulization 3 milliLiter(s) Nebulizer every 6 hours  anastrozole 1 milliGRAM(s) Oral daily  apixaban 5 milliGRAM(s) Oral every 12 hours  artificial  tears Solution 1 Drop(s) Both EYES two times a day  carvedilol 6.25 milliGRAM(s) Oral every 12 hours  chlorhexidine 2% Cloths 1 Application(s) Topical <User Schedule>  insulin lispro (ADMELOG) corrective regimen sliding scale   SubCutaneous three times a day with meals  insulin lispro (ADMELOG) corrective regimen sliding scale   SubCutaneous at bedtime  methylPREDNISolone sodium succinate Injectable 40 milliGRAM(s) IV Push daily  pantoprazole  Injectable 40 milliGRAM(s) IV Push daily  potassium phosphate / sodium phosphate Powder (PHOS-NaK) 1 Packet(s) Oral four times a day before meals  simvastatin 20 milliGRAM(s) Oral at bedtime    	  	  LABS:	 	                        13.6   8.25  )-----------( 263      ( 05 Feb 2023 07:09 )             43.7     02-05    139  |  99  |  22<H>  ----------------------------<  152<H>  4.2   |  36<H>  |  0.86    Ca    9.2      05 Feb 2023 07:09  Phos  1.8     02-05  Mg     2.5     02-05    TPro  6.4  /  Alb  2.7<L>  /  TBili  0.9  /  DBili  x   /  AST  12  /  ALT  26  /  AlkPhos  95  02-05    proBNP: Serum Pro-Brain Natriuretic Peptide: 3916 pg/mL (01-30 @ 20:20)  Serum Pro-Brain Natriuretic Peptide: 1943 pg/mL (01-11 @ 16:58)

## 2023-02-06 NOTE — PROGRESS NOTE ADULT - PROBLEM SELECTOR PLAN 2
worsened by recent to AHRF 2/2 Acute diastolic CHF, COPD exacerbation and UTI. Patient with multiple chronic and debilitative conditions.    Baseline: Was able to ambulate with a rolling walker.    Recommendations:  OOB to chair with assistance  PT consultation appreciated  Family requested Home PT upon discharge      - cw supportive care, encourage PO, movement as halina  - PT eval when medically appropriate  - Ingrid not within family GOC, prefer her DC'd home and resume HHA Morbid obesity with malnutrition. Serum Albumin= 2.7 (2/5/2023)  Clinical evidence indicates that the patient has Moderate protein calorie malnutrition/ 2nd degree  In context of Chronic Illness (>1 month)  Energy/Food intake <75% of estimated energy requirement >7 days  Weight loss: Mild  (lbs lost recently)  Body Fat loss: Mild    Muscle mass loss: Mild   Fluid Accumulation: Mild    Strength: weakened Mild     Recommend:   c/w regular diet (DASH/TLC) with Glucerna Shakes supplementation oral BID  Aspiration precautions, keep head of bed at least 45 degrees during feeding

## 2023-02-06 NOTE — PROGRESS NOTE ADULT - SUBJECTIVE AND OBJECTIVE BOX
Patient is seen and examined at the bed side, is afebrile. She remains awake, alert and on oxygen via NC.        REVIEW OF SYSTEMS: All other review systems are negative      ALLERGIES: Chicken (Stomach Upset)  losartan (Angioedema)      Vital Signs Last 24 Hrs  T(C): 36.9 (2023 14:16), Max: 36.9 (2023 14:16)  T(F): 98.5 (2023 14:16), Max: 98.5 (2023 14:16)  HR: 78 (2023 14:16) (67 - 93)  BP: 130/68 (2023 14:16) (116/72 - 141/86)  BP(mean): --  RR: 18 (2023 14:16) (14 - 19)  SpO2: 96% (2023 14:16) (94% - 100%)    Parameters below as of 2023 14:16  Patient On (Oxygen Delivery Method): nasal cannula  O2 Flow (L/min): 2        PHYSICAL EXAM:  GENERAL: Not in distress, on oxygen via  NC  CHEST/LUNG:  Not using accessory muscles   HEART: s1 and s2 present  ABDOMEN:  Nontender and  Nondistended  EXTREMITIES: B/L  pedal  edema  CNS: awake and alert      LABS: No new Labs                         13.6   8.25  )-----------( 263      ( 2023 07:09 )             43.7                  14.7   5.58  )-----------( 253      ( 2023 03:48 )             48.7         02-    139  |  99  |  22<H>  ----------------------------<  152<H>  4.2   |  36<H>  |  0.86    Ca    9.2      2023 07:09  Phos  1.8     02-05  Mg     2.5     02-05    TPro  6.4  /  Alb  2.7<L>  /  TBili  0.9  /  DBili  x   /  AST  12  /  ALT  26  /  AlkPhos  95  02-05      02-04    135  |  99  |  20<H>  ----------------------------<  174<H>  4.5   |  33<H>  |  0.82    Ca    9.4      2023 05:25  Phos  3.4     02-04  Mg     2.6     02-04    TPro  6.8  /  Alb  2.9<L>  /  TBili  0.8  /  DBili  x   /  AST  14  /  ALT  26  /  AlkPhos  106  02-04    PT/INR - ( 2023 18:00 )   PT: 23.8 sec;   INR: 1.99 ratio      PTT - ( 2023 18:00 )  PTT:34.5 sec      CAPILLARY BLOOD GLUCOSE  POCT Blood Glucose.: 250 mg/dL (2023 01:11)  POCT Blood Glucose.: 176 mg/dL (2023 23:47)  POCT Blood Glucose.: 176 mg/dL (2023 16:31)        Urinalysis Basic - ( 2023 21:30 )  Color: Yellow / Appearance: very cloudy / S.010 / pH: x  Gluc: x / Ketone: Negative  / Bili: Negative / Urobili: Negative   Blood: x / Protein: 30 mg/dL / Nitrite: Negative   Leuk Esterase: Moderate / RBC: 25-50 /HPF / WBC >50 /HPF   Sq Epi: x / Non Sq Epi: Few /HPF / Bacteria: Moderate /HPF        MEDICATIONS  (STANDING):    albuterol/ipratropium for Nebulization 3 milliLiter(s) Nebulizer every 6 hours  anastrozole 1 milliGRAM(s) Oral daily  apixaban 5 milliGRAM(s) Oral every 12 hours  artificial  tears Solution 1 Drop(s) Both EYES two times a day  carvedilol 6.25 milliGRAM(s) Oral every 12 hours  chlorhexidine 2% Cloths 1 Application(s) Topical <User Schedule>  insulin lispro (ADMELOG) corrective regimen sliding scale   SubCutaneous three times a day with meals  insulin lispro (ADMELOG) corrective regimen sliding scale   SubCutaneous at bedtime  methylPREDNISolone sodium succinate Injectable 40 milliGRAM(s) IV Push daily  pantoprazole  Injectable 40 milliGRAM(s) IV Push daily  simvastatin 20 milliGRAM(s) Oral at bedtime          RADIOLOGY & ADDITIONAL TESTS:    23: Xray Chest 1 View- PORTABLE-Urgent (23 @ 22:00) There are increased interstitial markings which may represent the   patient's underlying COPD or pulmonary venous congestion. No focal  consolidation or gross effusion.        MICROBIOLOGY DATA:    Culture - Urine (23 @ 21:30)   Specimen Source: Clean Catch Clean Catch (Midstream)   Culture Results:   >100,000 CFU/ml Klebsiella aerogenes (Previously Enterobacter)     Culture - Blood (23 @ 18:00)   Specimen Source: .Blood Blood-Peripheral   Culture Results: No growth to date.     Culture - Blood (23 @ 17:50)   Specimen Source: .Blood Blood-Peripheral   Culture Results: No growth to date.     Flu With COVID-19 By PATRICK (23 @ 18:00)   SARS-CoV-2 Result: NotDetec               Patient is seen and examined at the bed side, is afebrile. She is doing much better, family at the bed side.       REVIEW OF SYSTEMS: All other review systems are negative      ALLERGIES: Chicken (Stomach Upset)  losartan (Angioedema)      Vital Signs Last 24 Hrs  T(C): 36.9 (2023 14:16), Max: 36.9 (2023 14:16)  T(F): 98.5 (2023 14:16), Max: 98.5 (2023 14:16)  HR: 78 (2023 14:16) (67 - 93)  BP: 130/68 (2023 14:16) (116/72 - 141/86)  BP(mean): --  RR: 18 (2023 14:16) (14 - 19)  SpO2: 96% (2023 14:16) (94% - 100%)    Parameters below as of 2023 14:16  Patient On (Oxygen Delivery Method): nasal cannula  O2 Flow (L/min): 2        PHYSICAL EXAM:  GENERAL: Not in distress, on oxygen via  NC  CHEST/LUNG:  Not using accessory muscles   HEART: s1 and s2 present  ABDOMEN:  Nontender and  Nondistended  EXTREMITIES: B/L  pedal  edema  CNS: awake and alert      LABS: No new Labs                         13.6   8.25  )-----------( 263      ( 2023 07:09 )             43.7                  14.7   5.58  )-----------( 253      ( 2023 03:48 )             48.7         -    139  |  99  |  22<H>  ----------------------------<  152<H>  4.2   |  36<H>  |  0.86    Ca    9.2      2023 07:09  Phos  1.8     02-05  Mg     2.5     02-05    TPro  6.4  /  Alb  2.7<L>  /  TBili  0.9  /  DBili  x   /  AST  12  /  ALT  26  /  AlkPhos  95  02-05      02-04    135  |  99  |  20<H>  ----------------------------<  174<H>  4.5   |  33<H>  |  0.82    Ca    9.4      2023 05:25  Phos  3.4     02-04  Mg     2.6     02-04    TPro  6.8  /  Alb  2.9<L>  /  TBili  0.8  /  DBili  x   /  AST  14  /  ALT  26  /  AlkPhos  106  02-04    PT/INR - ( 2023 18:00 )   PT: 23.8 sec;   INR: 1.99 ratio      PTT - ( 2023 18:00 )  PTT:34.5 sec      CAPILLARY BLOOD GLUCOSE  POCT Blood Glucose.: 250 mg/dL (2023 01:11)  POCT Blood Glucose.: 176 mg/dL (2023 23:47)  POCT Blood Glucose.: 176 mg/dL (2023 16:31)        Urinalysis Basic - ( 2023 21:30 )  Color: Yellow / Appearance: very cloudy / S.010 / pH: x  Gluc: x / Ketone: Negative  / Bili: Negative / Urobili: Negative   Blood: x / Protein: 30 mg/dL / Nitrite: Negative   Leuk Esterase: Moderate / RBC: 25-50 /HPF / WBC >50 /HPF   Sq Epi: x / Non Sq Epi: Few /HPF / Bacteria: Moderate /HPF        MEDICATIONS  (STANDING):    albuterol/ipratropium for Nebulization 3 milliLiter(s) Nebulizer every 6 hours  anastrozole 1 milliGRAM(s) Oral daily  apixaban 5 milliGRAM(s) Oral every 12 hours  artificial  tears Solution 1 Drop(s) Both EYES two times a day  carvedilol 6.25 milliGRAM(s) Oral every 12 hours  chlorhexidine 2% Cloths 1 Application(s) Topical <User Schedule>  insulin lispro (ADMELOG) corrective regimen sliding scale   SubCutaneous three times a day with meals  insulin lispro (ADMELOG) corrective regimen sliding scale   SubCutaneous at bedtime  methylPREDNISolone sodium succinate Injectable 40 milliGRAM(s) IV Push daily  pantoprazole  Injectable 40 milliGRAM(s) IV Push daily  simvastatin 20 milliGRAM(s) Oral at bedtime          RADIOLOGY & ADDITIONAL TESTS:    23: Xray Chest 1 View- PORTABLE-Urgent (23 @ 22:00) There are increased interstitial markings which may represent the   patient's underlying COPD or pulmonary venous congestion. No focal  consolidation or gross effusion.        MICROBIOLOGY DATA:    Culture - Urine (23 @ 21:30)   Specimen Source: Clean Catch Clean Catch (Midstream)   Culture Results:   >100,000 CFU/ml Klebsiella aerogenes (Previously Enterobacter)     Culture - Blood (23 @ 18:00)   Specimen Source: .Blood Blood-Peripheral   Culture Results: No growth to date.     Culture - Blood (23 @ 17:50)   Specimen Source: .Blood Blood-Peripheral   Culture Results: No growth to date.     Flu With COVID-19 By PATRICK (23 @ 18:00)   SARS-CoV-2 Result: NotDetec

## 2023-02-06 NOTE — PROGRESS NOTE ADULT - PROBLEM SELECTOR PLAN 9
DVT and GI prophylaxis  F/U ABG in am. If hypercapnic will need Trilogy at home  Continue oxygen support. Check on home oxygen.  Continue bronchodilators.  Family wants patient to go home not [facility]  CM following.

## 2023-02-06 NOTE — PROGRESS NOTE ADULT - SUBJECTIVE AND OBJECTIVE BOX
Patient is a 89y old  Female who presents with a chief complaint of AHRF (05 Feb 2023 13:58)  Awake, alert, comfortable in bed in NAD. Doing well on RA    INTERVAL HPI/OVERNIGHT EVENTS:      VITAL SIGNS:  T(F): 97.8 (02-06-23 @ 05:12)  HR: 93 (02-06-23 @ 05:12)  BP: 141/86 (02-06-23 @ 05:12)  RR: 14 (02-06-23 @ 05:12)  SpO2: 94% (02-06-23 @ 05:12)  Wt(kg): --  I&O's Detail          REVIEW OF SYSTEMS:    CONSTITUTIONAL:  No fevers, chills, sweats    HEENT:  Eyes:  No diplopia or blurred vision. ENT:  No earache, sore throat or runny nose.    CARDIOVASCULAR:  No pressure, squeezing, tightness, or heaviness about the chest; no palpitations.    RESPIRATORY:  Per HPI    GASTROINTESTINAL:  No abdominal pain, nausea, vomiting or diarrhea.    GENITOURINARY:  No dysuria, frequency or urgency.    NEUROLOGIC:  No paresthesias, fasciculations, seizures or weakness.    PSYCHIATRIC:  No disorder of thought or mood.      PHYSICAL EXAM:    Constitutional: Well developed and nourished  Eyes:Perrla  ENMT: normal  Neck:supple  Respiratory: good air entry  Cardiovascular: S1 S2 regular  Gastrointestinal: Soft, Non tender  Extremities: No edema  Vascular:normal  Neurological:Awake, alert,Ox3  Musculoskeletal:Normal      MEDICATIONS  (STANDING):  albuterol/ipratropium for Nebulization 3 milliLiter(s) Nebulizer every 6 hours  anastrozole 1 milliGRAM(s) Oral daily  apixaban 5 milliGRAM(s) Oral every 12 hours  artificial  tears Solution 1 Drop(s) Both EYES two times a day  carvedilol 6.25 milliGRAM(s) Oral every 12 hours  chlorhexidine 2% Cloths 1 Application(s) Topical <User Schedule>  insulin lispro (ADMELOG) corrective regimen sliding scale   SubCutaneous three times a day with meals  insulin lispro (ADMELOG) corrective regimen sliding scale   SubCutaneous at bedtime  methylPREDNISolone sodium succinate Injectable 40 milliGRAM(s) IV Push daily  pantoprazole  Injectable 40 milliGRAM(s) IV Push daily  potassium phosphate / sodium phosphate Powder (PHOS-NaK) 1 Packet(s) Oral four times a day before meals  simvastatin 20 milliGRAM(s) Oral at bedtime    MEDICATIONS  (PRN):  guaiFENesin Oral Liquid (Sugar-Free) 200 milliGRAM(s) Oral every 6 hours PRN Cough      Allergies    losartan (Angioedema)    Intolerances    Chicken (Stomach Upset)      LABS:                        13.6   8.25  )-----------( 263      ( 05 Feb 2023 07:09 )             43.7     02-05    139  |  99  |  22<H>  ----------------------------<  152<H>  4.2   |  36<H>  |  0.86    Ca    9.2      05 Feb 2023 07:09  Phos  1.8     02-05  Mg     2.5     02-05    TPro  6.4  /  Alb  2.7<L>  /  TBili  0.9  /  DBili  x   /  AST  12  /  ALT  26  /  AlkPhos  95  02-05              CAPILLARY BLOOD GLUCOSE      POCT Blood Glucose.: 148 mg/dL (06 Feb 2023 07:36)  POCT Blood Glucose.: 181 mg/dL (05 Feb 2023 21:04)  POCT Blood Glucose.: 209 mg/dL (05 Feb 2023 16:53)  POCT Blood Glucose.: 229 mg/dL (05 Feb 2023 12:01)    pro-bnp 3916 01-30 @ 20:20     d-dimer --  01-30 @ 20:20      RADIOLOGY & ADDITIONAL TESTS:    CXR:  < from: Xray Chest 1 View- PORTABLE-Urgent (01.30.23 @ 22:00) >  Impression:    There are increased interstitial markings which may represent the   patient's underlying COPD or pulmonary venous congestion. No focal   consolidation or gross effusion.      < end of copied text >    Ct scan chest:    ekg;    echo:

## 2023-02-06 NOTE — PROGRESS NOTE ADULT - CONVERSATION DETAILS
Met patient and two family members, daughter Toña and granddaughter, Martin, at bedside. Patient was sitting up in bed and verbalized she was feeling comfortable. Answer all question appropriately and sometimes her daughter or granddaughter would repeat the questions. Asked them if they were understanding patient's diagnosis, prognosis, and current medical plans. Granddaughter asked about patient's blood work, which I was able to review with her. She also wanted to know when patient was likely to go home. All questions answered.    Case discussed with Primary Team, Lucia ROD, who stated that they will repeat one more blood gas in AM tomorrow and if the results are satisfactory, patient may be discharge with Home PT services. Patient and family were made aware and were happy to hear it.

## 2023-02-06 NOTE — PROGRESS NOTE ADULT - PROBLEM SELECTOR PLAN 3
89 y/o female with multiple chronic and deteriorating conditions. Patient is at high risk for mortality, morbidity, infections, and re-hospitalization.    Patient with good support system at home (Seven very dedicated DTRs and CDPAP in place)  Followed by PCP.    Code Status: FULL CODE. worsened by recent to AHRF 2/2 Acute diastolic CHF, COPD exacerbation and UTI. Patient with multiple chronic and debilitative conditions.    Baseline: Was able to ambulate with a rolling walker.    Recommendations:  OOB to chair with assistance  PT consultation appreciated  Family requested Home PT upon discharge      - cw supportive care, encourage PO, movement as halina  - PT eval when medically appropriate  - Ingrid not within family GOC, prefer her DC'd home and resume HHA

## 2023-02-06 NOTE — PHYSICAL THERAPY INITIAL EVALUATION ADULT - CRITERIA FOR SKILLED THERAPEUTIC INTERVENTIONS
impairments found/functional limitations in following categories/risk reduction/prevention
impairments found/functional limitations in following categories/risk reduction/prevention/rehab potential/therapy frequency/predicted duration of therapy intervention/anticipated equipment needs at discharge/anticipated discharge recommendation

## 2023-02-07 DIAGNOSIS — J96.21 ACUTE AND CHRONIC RESPIRATORY FAILURE WITH HYPOXIA: ICD-10-CM

## 2023-02-07 LAB
ALBUMIN SERPL ELPH-MCNC: 2.7 G/DL — LOW (ref 3.5–5)
ALP SERPL-CCNC: 92 U/L — SIGNIFICANT CHANGE UP (ref 40–120)
ALT FLD-CCNC: 27 U/L DA — SIGNIFICANT CHANGE UP (ref 10–60)
ANION GAP SERPL CALC-SCNC: 5 MMOL/L — SIGNIFICANT CHANGE UP (ref 5–17)
AST SERPL-CCNC: 10 U/L — SIGNIFICANT CHANGE UP (ref 10–40)
BILIRUB SERPL-MCNC: 0.8 MG/DL — SIGNIFICANT CHANGE UP (ref 0.2–1.2)
BUN SERPL-MCNC: 16 MG/DL — SIGNIFICANT CHANGE UP (ref 7–18)
CALCIUM SERPL-MCNC: 8.9 MG/DL — SIGNIFICANT CHANGE UP (ref 8.4–10.5)
CHLORIDE SERPL-SCNC: 101 MMOL/L — SIGNIFICANT CHANGE UP (ref 96–108)
CO2 SERPL-SCNC: 34 MMOL/L — HIGH (ref 22–31)
CREAT SERPL-MCNC: 0.76 MG/DL — SIGNIFICANT CHANGE UP (ref 0.5–1.3)
EGFR: 74 ML/MIN/1.73M2 — SIGNIFICANT CHANGE UP
GAS PNL BLDA: SIGNIFICANT CHANGE UP
GLUCOSE BLDC GLUCOMTR-MCNC: 198 MG/DL — HIGH (ref 70–99)
GLUCOSE BLDC GLUCOMTR-MCNC: 200 MG/DL — HIGH (ref 70–99)
GLUCOSE BLDC GLUCOMTR-MCNC: 212 MG/DL — HIGH (ref 70–99)
GLUCOSE BLDC GLUCOMTR-MCNC: 258 MG/DL — HIGH (ref 70–99)
GLUCOSE SERPL-MCNC: 149 MG/DL — HIGH (ref 70–99)
HCT VFR BLD CALC: 42.1 % — SIGNIFICANT CHANGE UP (ref 34.5–45)
HGB BLD-MCNC: 13.2 G/DL — SIGNIFICANT CHANGE UP (ref 11.5–15.5)
MAGNESIUM SERPL-MCNC: 2.3 MG/DL — SIGNIFICANT CHANGE UP (ref 1.6–2.6)
MCHC RBC-ENTMCNC: 30.9 PG — SIGNIFICANT CHANGE UP (ref 27–34)
MCHC RBC-ENTMCNC: 31.4 GM/DL — LOW (ref 32–36)
MCV RBC AUTO: 98.6 FL — SIGNIFICANT CHANGE UP (ref 80–100)
NRBC # BLD: 0 /100 WBCS — SIGNIFICANT CHANGE UP (ref 0–0)
PHOSPHATE SERPL-MCNC: 2.3 MG/DL — LOW (ref 2.5–4.5)
PLATELET # BLD AUTO: 253 K/UL — SIGNIFICANT CHANGE UP (ref 150–400)
POTASSIUM SERPL-MCNC: 4.3 MMOL/L — SIGNIFICANT CHANGE UP (ref 3.5–5.3)
POTASSIUM SERPL-SCNC: 4.3 MMOL/L — SIGNIFICANT CHANGE UP (ref 3.5–5.3)
PROT SERPL-MCNC: 6.2 G/DL — SIGNIFICANT CHANGE UP (ref 6–8.3)
RBC # BLD: 4.27 M/UL — SIGNIFICANT CHANGE UP (ref 3.8–5.2)
RBC # FLD: 13.2 % — SIGNIFICANT CHANGE UP (ref 10.3–14.5)
SODIUM SERPL-SCNC: 140 MMOL/L — SIGNIFICANT CHANGE UP (ref 135–145)
WBC # BLD: 7.17 K/UL — SIGNIFICANT CHANGE UP (ref 3.8–10.5)
WBC # FLD AUTO: 7.17 K/UL — SIGNIFICANT CHANGE UP (ref 3.8–10.5)

## 2023-02-07 RX ORDER — SODIUM,POTASSIUM PHOSPHATES 278-250MG
1 POWDER IN PACKET (EA) ORAL ONCE
Refills: 0 | Status: COMPLETED | OUTPATIENT
Start: 2023-02-07 | End: 2023-02-07

## 2023-02-07 RX ADMIN — Medication 3 MILLILITER(S): at 20:13

## 2023-02-07 RX ADMIN — Medication 3 MILLILITER(S): at 03:11

## 2023-02-07 RX ADMIN — Medication 1: at 07:59

## 2023-02-07 RX ADMIN — Medication 1 DROP(S): at 17:13

## 2023-02-07 RX ADMIN — Medication 3 MILLILITER(S): at 08:33

## 2023-02-07 RX ADMIN — Medication 3 MILLILITER(S): at 15:23

## 2023-02-07 RX ADMIN — APIXABAN 5 MILLIGRAM(S): 2.5 TABLET, FILM COATED ORAL at 17:13

## 2023-02-07 RX ADMIN — ANASTROZOLE 1 MILLIGRAM(S): 1 TABLET ORAL at 11:33

## 2023-02-07 RX ADMIN — SIMVASTATIN 20 MILLIGRAM(S): 20 TABLET, FILM COATED ORAL at 21:32

## 2023-02-07 RX ADMIN — Medication 1 PACKET(S): at 11:33

## 2023-02-07 RX ADMIN — Medication 40 MILLIGRAM(S): at 06:09

## 2023-02-07 RX ADMIN — CARVEDILOL PHOSPHATE 6.25 MILLIGRAM(S): 80 CAPSULE, EXTENDED RELEASE ORAL at 06:09

## 2023-02-07 RX ADMIN — Medication 3: at 11:34

## 2023-02-07 RX ADMIN — CARVEDILOL PHOSPHATE 6.25 MILLIGRAM(S): 80 CAPSULE, EXTENDED RELEASE ORAL at 17:12

## 2023-02-07 RX ADMIN — APIXABAN 5 MILLIGRAM(S): 2.5 TABLET, FILM COATED ORAL at 06:09

## 2023-02-07 RX ADMIN — CHLORHEXIDINE GLUCONATE 1 APPLICATION(S): 213 SOLUTION TOPICAL at 06:25

## 2023-02-07 RX ADMIN — Medication 1 DROP(S): at 06:24

## 2023-02-07 RX ADMIN — PANTOPRAZOLE SODIUM 40 MILLIGRAM(S): 20 TABLET, DELAYED RELEASE ORAL at 11:34

## 2023-02-07 RX ADMIN — Medication 2: at 17:12

## 2023-02-07 NOTE — PROGRESS NOTE ADULT - PROBLEM SELECTOR PLAN 1
Acute on chronic respiratory acidosis, COPD  and  Acute diastolic heart failure.   Acute resolved.  Solumedrol d/c continue with prednisone with taper  Lasix 20mg daily.  Remains hypercapnic on AVAPS PCO2 57. PCO2 on admission on BIPAP 84.  Failed BIPAP and AVAPS therapy.  Will need Trilogy/AVAPS-AE upon discharge. Without AVAPS-AE patient is a high high for readmission and intubation secondary to persistent hypercapnia. Second admission for respiratory distress since October 2022. Patient also has spinal deformities which is contributing to her hypercapnia.

## 2023-02-07 NOTE — PROGRESS NOTE ADULT - ASSESSMENT
Patient is a 89y old  Female from home, ambulates with RW, with PMHx HTN, COPD, Afib, HFpEF (7/29/22 EF 55-60%, LVH, GIDD, mild pulmonary HTN), Breast CA, and remote h/o LLE DVT, now presents to the ER for evaluation of cough and shortness of breath. She has had dry cough for over a week which became productive and associated with wheezing over the last few days. Pts O2 saturation has been fluctuating in the 80s over the past week and this morning was in the high 70s, per granddaughters.  Pt was taken to PCP Dr. Bianchi's office who sent her to the ER for admission.. On admission, she has no fever, no Leukocytosis, but found to have positive Urine analysis and negative CXR. She has started on Ceftriaxone and The ID consult requested to assist with further evaluation and antibiotic management.    # Metabolic encephalopathy - resolved  # UTI  - Urine Cx grew Klebsiella- s/p completed the course of Ceftriaxone   # Hypoxia- on supplemental oxygenation  # Metabolic encephalopathy  # Hypercapnic Respiratory failure- on BIPAP- in ICU - 2/3/23- transferred out of ICU 2/4/23    would recommend:    1. Advanced diet as tolerated  2. Monitor OFF Abx as she completed the course  3. Management orf BIPAP as per  protocol   4. Diuresis as per Primary team   5. Supplemental oxygenation and bronchodilator as needed  6. PT/OOB to chair     d/w covering NP, Lucia and family at the bed side regarding treatment plan and prognosis,     Attending Attestation:    Spent more than 35 minutes on total encounter, more than 50 % of the visit was spent counseling and/or coordinating care by the Attending physician.  '  Complexity:    # Metabolic encephalopathy - resolved  # UTI  # Hypoxia- on supplemental oxygenation   Patient is a 89y old  Female from home, ambulates with RW, with PMHx HTN, COPD, Afib, HFpEF (7/29/22 EF 55-60%, LVH, GIDD, mild pulmonary HTN), Breast CA, and remote h/o LLE DVT, now presents to the ER for evaluation of cough and shortness of breath. She has had dry cough for over a week which became productive and associated with wheezing over the last few days. Pts O2 saturation has been fluctuating in the 80s over the past week and this morning was in the high 70s, per granddaughters.  Pt was taken to PCP Dr. Bianchi's office who sent her to the ER for admission.. On admission, she has no fever, no Leukocytosis, but found to have positive Urine analysis and negative CXR. She has started on Ceftriaxone and The ID consult requested to assist with further evaluation and antibiotic management.    # Metabolic encephalopathy - resolved  # UTI  - Urine Cx grew Klebsiella- s/p completed the course of Ceftriaxone   # Hypoxia- on supplemental oxygenation  # Metabolic encephalopathy  # Hypercapnic Respiratory failure- on BIPAP- in ICU - 2/3/23- transferred out of ICU 2/4/23    would recommend:    1. PT/OOB to chair   2. Monitor OFF Abx as she completed the course  3. Management orf BIPAP as per  protocol   4. Diuresis as per Primary team   5. Supplemental oxygenation and bronchodilator as needed    d/w  family at the bed side regarding treatment plan and prognosis,     Attending Attestation:    Spent more than 35 minutes on total encounter, more than 50 % of the visit was spent counseling and/or coordinating care by the Attending physician.  '  Complexity:    # Metabolic encephalopathy - resolved  # UTI  # Hypoxia- on supplemental oxygenation

## 2023-02-07 NOTE — PROGRESS NOTE ADULT - SUBJECTIVE AND OBJECTIVE BOX
Patient is a 89y old  Female who presents with a chief complaint of AHRF (06 Feb 2023 18:30)  Awake, alert, clinically stable in bed, doing well on RA    INTERVAL HPI/OVERNIGHT EVENTS:      VITAL SIGNS:  T(F): 98.2 (02-07-23 @ 04:44)  HR: 98 (02-07-23 @ 04:44)  BP: 136/98 (02-07-23 @ 04:44)  RR: 20 (02-07-23 @ 04:44)  SpO2: 93% (02-07-23 @ 04:44)  Wt(kg): --  I&O's Detail          REVIEW OF SYSTEMS:    CONSTITUTIONAL:  No fevers, chills, sweats    HEENT:  Eyes:  No diplopia or blurred vision. ENT:  No earache, sore throat or runny nose.    CARDIOVASCULAR:  No pressure, squeezing, tightness, or heaviness about the chest; no palpitations.    RESPIRATORY:  Per HPI    GASTROINTESTINAL:  No abdominal pain, nausea, vomiting or diarrhea.    GENITOURINARY:  No dysuria, frequency or urgency.    NEUROLOGIC:  No paresthesias, fasciculations, seizures or weakness.    PSYCHIATRIC:  No disorder of thought or mood.      PHYSICAL EXAM:    Constitutional: Well developed and nourished  Eyes:Perrla  ENMT: normal  Neck:supple  Respiratory: good air entry  Cardiovascular: S1 S2 regular  Gastrointestinal: Soft, Non tender  Extremities: No edema  Vascular:normal  Neurological:Awake, alert,Ox3  Musculoskeletal:Normal      MEDICATIONS  (STANDING):  albuterol/ipratropium for Nebulization 3 milliLiter(s) Nebulizer every 6 hours  anastrozole 1 milliGRAM(s) Oral daily  apixaban 5 milliGRAM(s) Oral every 12 hours  artificial  tears Solution 1 Drop(s) Both EYES two times a day  carvedilol 6.25 milliGRAM(s) Oral every 12 hours  chlorhexidine 2% Cloths 1 Application(s) Topical <User Schedule>  insulin lispro (ADMELOG) corrective regimen sliding scale   SubCutaneous three times a day with meals  insulin lispro (ADMELOG) corrective regimen sliding scale   SubCutaneous at bedtime  pantoprazole  Injectable 40 milliGRAM(s) IV Push daily  potassium phosphate / sodium phosphate Powder (PHOS-NaK) 1 Packet(s) Oral once  predniSONE   Tablet 40 milliGRAM(s) Oral daily  simvastatin 20 milliGRAM(s) Oral at bedtime    MEDICATIONS  (PRN):  guaiFENesin Oral Liquid (Sugar-Free) 200 milliGRAM(s) Oral every 6 hours PRN Cough      Allergies    losartan (Angioedema)    Intolerances    Chicken (Stomach Upset)      LABS:                        13.2   7.17  )-----------( 253      ( 07 Feb 2023 07:19 )             42.1     02-07    140  |  101  |  16  ----------------------------<  149<H>  4.3   |  34<H>  |  0.76    Ca    8.9      07 Feb 2023 07:19  Phos  2.3     02-07  Mg     2.3     02-07    TPro  6.2  /  Alb  2.7<L>  /  TBili  0.8  /  DBili  x   /  AST  10  /  ALT  27  /  AlkPhos  92  02-07        ABG - ( 07 Feb 2023 05:54 )  pH, Arterial: 7.44  pH, Blood: x     /  pCO2: 57    /  pO2: 145   / HCO3: 39    / Base Excess: 12.1  /  SaO2: 100                   CAPILLARY BLOOD GLUCOSE      POCT Blood Glucose.: 198 mg/dL (07 Feb 2023 07:45)  POCT Blood Glucose.: 194 mg/dL (06 Feb 2023 21:08)  POCT Blood Glucose.: 270 mg/dL (06 Feb 2023 16:38)  POCT Blood Glucose.: 252 mg/dL (06 Feb 2023 12:00)        RADIOLOGY & ADDITIONAL TESTS:    CXR:    Ct scan chest:    ekg;    echo:

## 2023-02-07 NOTE — PROGRESS NOTE ADULT - SUBJECTIVE AND OBJECTIVE BOX
Patient was seen and examined  Patient is a 89y old  Female who presents with a chief complaint of AHRF (07 Feb 2023 14:25)      INTERVAL HPI/OVERNIGHT EVENTS:  T(C): 37 (02-07-23 @ 17:00), Max: 37.1 (02-06-23 @ 20:14)  HR: 88 (02-07-23 @ 17:00) (72 - 98)  BP: 148/99 (02-07-23 @ 17:00) (129/86 - 148/99)  RR: 18 (02-07-23 @ 17:00) (18 - 20)  SpO2: 97% (02-07-23 @ 17:00) (93% - 99%)  Wt(kg): --  I&O's Summary    07 Feb 2023 07:01  -  07 Feb 2023 19:43  --------------------------------------------------------  IN: 0 mL / OUT: 500 mL / NET: -500 mL        LABS:                        13.2   7.17  )-----------( 253      ( 07 Feb 2023 07:19 )             42.1     02-07    140  |  101  |  16  ----------------------------<  149<H>  4.3   |  34<H>  |  0.76    Ca    8.9      07 Feb 2023 07:19  Phos  2.3     02-07  Mg     2.3     02-07    TPro  6.2  /  Alb  2.7<L>  /  TBili  0.8  /  DBili  x   /  AST  10  /  ALT  27  /  AlkPhos  92  02-07        CAPILLARY BLOOD GLUCOSE      POCT Blood Glucose.: 212 mg/dL (07 Feb 2023 16:55)  POCT Blood Glucose.: 258 mg/dL (07 Feb 2023 11:17)  POCT Blood Glucose.: 198 mg/dL (07 Feb 2023 07:45)  POCT Blood Glucose.: 194 mg/dL (06 Feb 2023 21:08)        ABG - ( 07 Feb 2023 05:54 )  pH, Arterial: 7.44  pH, Blood: x     /  pCO2: 57    /  pO2: 145   / HCO3: 39    / Base Excess: 12.1  /  SaO2: 100                   MEDICATIONS  (STANDING):  albuterol/ipratropium for Nebulization 3 milliLiter(s) Nebulizer every 6 hours  anastrozole 1 milliGRAM(s) Oral daily  apixaban 5 milliGRAM(s) Oral every 12 hours  artificial  tears Solution 1 Drop(s) Both EYES two times a day  carvedilol 6.25 milliGRAM(s) Oral every 12 hours  chlorhexidine 2% Cloths 1 Application(s) Topical <User Schedule>  insulin lispro (ADMELOG) corrective regimen sliding scale   SubCutaneous three times a day with meals  insulin lispro (ADMELOG) corrective regimen sliding scale   SubCutaneous at bedtime  pantoprazole  Injectable 40 milliGRAM(s) IV Push daily  predniSONE   Tablet 40 milliGRAM(s) Oral daily  simvastatin 20 milliGRAM(s) Oral at bedtime    MEDICATIONS  (PRN):  guaiFENesin Oral Liquid (Sugar-Free) 200 milliGRAM(s) Oral every 6 hours PRN Cough      RADIOLOGY & ADDITIONAL TESTS:    Imaging Personally Reviewed:  [ ] YES  [ ] NO    REVIEW OF SYSTEMS:  nad       Consultant(s) Notes Reviewed:  [ x ] YES  [ ] NO    PHYSICAL EXAM:  GENERAL: NAD, well-groomed, well-developed  HEAD:  Atraumatic, Normocephalic  EYES: EOMI, PERRLA, conjunctiva and sclera clear  ENMT: No tonsillar erythema, exudates, or enlargement; Moist mucous membranes, Good dentition, No lesions  NECK: Supple, No JVD, Normal thyroid  NERVOUS SYSTEM:  awake aaox1  CHEST/LUNG: Clear to percussion bilaterally; No rales, rhonchi, wheezing, or rubs  HEART: Regular rate and rhythm; No murmurs, rubs, or gallops  ABDOMEN: Soft, Nontender, Nondistended; Bowel sounds present  EXTREMITIES:  2+ Peripheral Pulses, No clubbing, cyanosis, or edema  LYMPH: No lymphadenopathy noted  SKIN: No rashes or lesions    Care Discussed with Consultants/Other Providers [ x] YES  [ ] NO

## 2023-02-07 NOTE — PROGRESS NOTE ADULT - PROBLEM SELECTOR PLAN 2
Acute resolving. Diastolic heart failure with mild pulmonary hypertension.  Continue coreg BID and daily lasix  ECHO July 2022- EF 55-60%, LVH, GIDD, mild pulm HTN  pro-BNP 3916 (prev 1943)  S/p diuresis in ICU  s/p  Lasix 20 mg daily IV  Daily weight   Dr Siu following.

## 2023-02-07 NOTE — PROGRESS NOTE ADULT - PROBLEM SELECTOR PLAN 9
DVT and GI prophylaxis.  Failed BIPAP and AVAPS therapy. Remained hypercapnic on both. PCO2 57-84. Will need Trilogy/AVAPS-AE at home to adequately manage persistent hypercapnia. Without Trilogy/AVAPS-AE patient will remain hypercapnic increasing the possibility of readmission and intubation.  Continue bronchodilators and prednisone.  Has home oxygen.  Trilogy ordered.

## 2023-02-07 NOTE — PROGRESS NOTE ADULT - ASSESSMENT
89 yo F, from home, with PMHx HTN, COPD, Afib, HFpEF (7/29/22 EF 55-60%, LVH, GIDD, Breast CA, and remote h/o LLE DVT p/w SOB and hypoxia to high 70s, admitted to medicine for AHRF 2/2 Acute diastolic CHF +/- COPD exacerbation and UTI (completed treatment). ICU consulted for concerns of AMS in the setting of Acute on chronic compensated respiratory acidosis requiring new trial of Bipap.  Patient downgraded to AI for further management. Currently on 2L NC. Will need BiPAP Q HS.   2/5 Change to AVAPS setting qHS.

## 2023-02-07 NOTE — PROGRESS NOTE ADULT - SUBJECTIVE AND OBJECTIVE BOX
CHIEF COMPLAINT:Patient is a 89y old  Female who presents with a chief complaint of AHRF.Pt appears comfortable.    	  REVIEW OF SYSTEMS:  CONSTITUTIONAL: No fever, weight loss, or fatigue  EYES: No eye pain, visual disturbances, or discharge  ENT:  No difficulty hearing, tinnitus, vertigo; No sinus or throat pain  NECK: No pain or stiffness  RESPIRATORY: No cough, wheezing, chills or hemoptysis; No Shortness of Breath  CARDIOVASCULAR: No chest pain, palpitations, passing out, dizziness, or leg swelling  GASTROINTESTINAL: No abdominal or epigastric pain. No nausea, vomiting, or hematemesis; No diarrhea or constipation. No melena or hematochezia.  GENITOURINARY: No dysuria, frequency, hematuria, or incontinence  NEUROLOGICAL: No headaches, memory loss, loss of strength, numbness, or tremors  SKIN: No itching, burning, rashes, or lesions   LYMPH Nodes: No enlarged glands  ENDOCRINE: No heat or cold intolerance; No hair loss  MUSCULOSKELETAL: No joint pain or swelling; No muscle, back, or extremity pain  PSYCHIATRIC: No depression, anxiety, mood swings, or difficulty sleeping  HEME/LYMPH: No easy bruising, or bleeding gums  ALLERGY AND IMMUNOLOGIC: No hives or eczema	      PHYSICAL EXAM:  T(C): 36.8 (02-07-23 @ 04:44), Max: 37.1 (02-06-23 @ 20:14)  HR: 98 (02-07-23 @ 04:44) (69 - 98)  BP: 136/98 (02-07-23 @ 04:44) (125/70 - 136/98)  RR: 20 (02-07-23 @ 04:44) (18 - 20)  SpO2: 93% (02-07-23 @ 04:44) (93% - 99%)  Wt(kg): --  I&O's Summary      Appearance: Normal	  HEENT:   Normal oral mucosa, PERRL, EOMI	  Lymphatic: No lymphadenopathy  Cardiovascular: Normal S1 S2, No JVD, No murmurs, No edema  Respiratory: Lungs clear to auscultation	  Psychiatry: A & O x 3, Mood & affect appropriate  Gastrointestinal:  Soft, Non-tender, + BS	  Skin: No rashes, No ecchymoses, No cyanosis	  Neurologic: Non-focal  Extremities: Normal range of motion, No clubbing, cyanosis or edema  Vascular: Peripheral pulses palpable 2+ bilaterally    MEDICATIONS  (STANDING):  albuterol/ipratropium for Nebulization 3 milliLiter(s) Nebulizer every 6 hours  anastrozole 1 milliGRAM(s) Oral daily  apixaban 5 milliGRAM(s) Oral every 12 hours  artificial  tears Solution 1 Drop(s) Both EYES two times a day  carvedilol 6.25 milliGRAM(s) Oral every 12 hours  chlorhexidine 2% Cloths 1 Application(s) Topical <User Schedule>  insulin lispro (ADMELOG) corrective regimen sliding scale   SubCutaneous three times a day with meals  insulin lispro (ADMELOG) corrective regimen sliding scale   SubCutaneous at bedtime  pantoprazole  Injectable 40 milliGRAM(s) IV Push daily  potassium phosphate / sodium phosphate Powder (PHOS-NaK) 1 Packet(s) Oral once  predniSONE   Tablet 40 milliGRAM(s) Oral daily  simvastatin 20 milliGRAM(s) Oral at bedtime    	  	  LABS:	 	                        13.2   7.17  )-----------( 253      ( 07 Feb 2023 07:19 )             42.1     02-07    140  |  101  |  16  ----------------------------<  149<H>  4.3   |  34<H>  |  0.76    Ca    8.9      07 Feb 2023 07:19  Phos  2.3     02-07  Mg     2.3     02-07    TPro  6.2  /  Alb  2.7<L>  /  TBili  0.8  /  DBili  x   /  AST  10  /  ALT  27  /  AlkPhos  92  02-07    proBNP: Serum Pro-Brain Natriuretic Peptide: 3916 pg/mL (01-30 @ 20:20)  Serum Pro-Brain Natriuretic Peptide: 1943 pg/mL (01-11 @ 16:58)

## 2023-02-07 NOTE — PROGRESS NOTE ADULT - SUBJECTIVE AND OBJECTIVE BOX
Patient is a 89y old  Female who presents with a chief complaint of AHRF (07 Feb 2023 10:19)  Awake, alert, clinically stable in NAD doing well on RA    INTERVAL HPI/OVERNIGHT EVENTS:      VITAL SIGNS:  T(F): 98.2 (02-07-23 @ 04:44)  HR: 98 (02-07-23 @ 04:44)  BP: 136/98 (02-07-23 @ 04:44)  RR: 20 (02-07-23 @ 04:44)  SpO2: 93% (02-07-23 @ 04:44)  Wt(kg): --  I&O's Detail          REVIEW OF SYSTEMS:    CONSTITUTIONAL:  No fevers, chills, sweats    HEENT:  Eyes:  No diplopia or blurred vision. ENT:  No earache, sore throat or runny nose.    CARDIOVASCULAR:  No pressure, squeezing, tightness, or heaviness about the chest; no palpitations.    RESPIRATORY:  Per HPI    GASTROINTESTINAL:  No abdominal pain, nausea, vomiting or diarrhea.    GENITOURINARY:  No dysuria, frequency or urgency.    NEUROLOGIC:  No paresthesias, fasciculations, seizures or weakness.    PSYCHIATRIC:  No disorder of thought or mood.      PHYSICAL EXAM:    Constitutional: Well developed and nourished  Eyes:Perrla  ENMT: normal  Neck:supple  Respiratory: good air entry  Cardiovascular: S1 S2 regular  Gastrointestinal: Soft, Non tender  Extremities: No edema  Vascular:normal  Neurological:Awake, alert,Ox3  Musculoskeletal:Normal      MEDICATIONS  (STANDING):  albuterol/ipratropium for Nebulization 3 milliLiter(s) Nebulizer every 6 hours  anastrozole 1 milliGRAM(s) Oral daily  apixaban 5 milliGRAM(s) Oral every 12 hours  artificial  tears Solution 1 Drop(s) Both EYES two times a day  carvedilol 6.25 milliGRAM(s) Oral every 12 hours  chlorhexidine 2% Cloths 1 Application(s) Topical <User Schedule>  insulin lispro (ADMELOG) corrective regimen sliding scale   SubCutaneous three times a day with meals  insulin lispro (ADMELOG) corrective regimen sliding scale   SubCutaneous at bedtime  pantoprazole  Injectable 40 milliGRAM(s) IV Push daily  potassium phosphate / sodium phosphate Powder (PHOS-NaK) 1 Packet(s) Oral once  predniSONE   Tablet 40 milliGRAM(s) Oral daily  simvastatin 20 milliGRAM(s) Oral at bedtime    MEDICATIONS  (PRN):  guaiFENesin Oral Liquid (Sugar-Free) 200 milliGRAM(s) Oral every 6 hours PRN Cough      Allergies    losartan (Angioedema)    Intolerances    Chicken (Stomach Upset)      LABS:                        13.2   7.17  )-----------( 253      ( 07 Feb 2023 07:19 )             42.1     02-07    140  |  101  |  16  ----------------------------<  149<H>  4.3   |  34<H>  |  0.76    Ca    8.9      07 Feb 2023 07:19  Phos  2.3     02-07  Mg     2.3     02-07    TPro  6.2  /  Alb  2.7<L>  /  TBili  0.8  /  DBili  x   /  AST  10  /  ALT  27  /  AlkPhos  92  02-07        ABG - ( 07 Feb 2023 05:54 )  pH, Arterial: 7.44  pH, Blood: x     /  pCO2: 57    /  pO2: 145   / HCO3: 39    / Base Excess: 12.1  /  SaO2: 100                   CAPILLARY BLOOD GLUCOSE      POCT Blood Glucose.: 198 mg/dL (07 Feb 2023 07:45)  POCT Blood Glucose.: 194 mg/dL (06 Feb 2023 21:08)  POCT Blood Glucose.: 270 mg/dL (06 Feb 2023 16:38)  POCT Blood Glucose.: 252 mg/dL (06 Feb 2023 12:00)        RADIOLOGY & ADDITIONAL TESTS:    CXR:    Ct scan chest:    ekg;    echo: Patient is a 89y old  Female who presents with a chief complaint of AHRF (07 Feb 2023 10:19)  Awake, alert, clinically stable in NAD. Doing well with NC oxygen supp    INTERVAL HPI/OVERNIGHT EVENTS:      VITAL SIGNS:  T(F): 98.2 (02-07-23 @ 04:44)  HR: 98 (02-07-23 @ 04:44)  BP: 136/98 (02-07-23 @ 04:44)  RR: 20 (02-07-23 @ 04:44)  SpO2: 93% (02-07-23 @ 04:44)  Wt(kg): --  I&O's Detail          REVIEW OF SYSTEMS:    CONSTITUTIONAL:  No fevers, chills, sweats    HEENT:  Eyes:  No diplopia or blurred vision. ENT:  No earache, sore throat or runny nose.    CARDIOVASCULAR:  No pressure, squeezing, tightness, or heaviness about the chest; no palpitations.    RESPIRATORY:  Per HPI    GASTROINTESTINAL:  No abdominal pain, nausea, vomiting or diarrhea.    GENITOURINARY:  No dysuria, frequency or urgency.    NEUROLOGIC:  No paresthesias, fasciculations, seizures or weakness.    PSYCHIATRIC:  No disorder of thought or mood.      PHYSICAL EXAM:    Constitutional: Well developed and nourished  Eyes:Perrla  ENMT: normal  Neck:supple  Respiratory: good air entry  Cardiovascular: S1 S2 regular  Gastrointestinal: Soft, Non tender  Extremities: No edema  Vascular:normal  Neurological:Awake, alert,Ox3  Musculoskeletal:Normal      MEDICATIONS  (STANDING):  albuterol/ipratropium for Nebulization 3 milliLiter(s) Nebulizer every 6 hours  anastrozole 1 milliGRAM(s) Oral daily  apixaban 5 milliGRAM(s) Oral every 12 hours  artificial  tears Solution 1 Drop(s) Both EYES two times a day  carvedilol 6.25 milliGRAM(s) Oral every 12 hours  chlorhexidine 2% Cloths 1 Application(s) Topical <User Schedule>  insulin lispro (ADMELOG) corrective regimen sliding scale   SubCutaneous three times a day with meals  insulin lispro (ADMELOG) corrective regimen sliding scale   SubCutaneous at bedtime  pantoprazole  Injectable 40 milliGRAM(s) IV Push daily  potassium phosphate / sodium phosphate Powder (PHOS-NaK) 1 Packet(s) Oral once  predniSONE   Tablet 40 milliGRAM(s) Oral daily  simvastatin 20 milliGRAM(s) Oral at bedtime    MEDICATIONS  (PRN):  guaiFENesin Oral Liquid (Sugar-Free) 200 milliGRAM(s) Oral every 6 hours PRN Cough      Allergies    losartan (Angioedema)    Intolerances    Chicken (Stomach Upset)      LABS:                        13.2   7.17  )-----------( 253      ( 07 Feb 2023 07:19 )             42.1     02-07    140  |  101  |  16  ----------------------------<  149<H>  4.3   |  34<H>  |  0.76    Ca    8.9      07 Feb 2023 07:19  Phos  2.3     02-07  Mg     2.3     02-07    TPro  6.2  /  Alb  2.7<L>  /  TBili  0.8  /  DBili  x   /  AST  10  /  ALT  27  /  AlkPhos  92  02-07        ABG - ( 07 Feb 2023 05:54 )  pH, Arterial: 7.44  pH, Blood: x     /  pCO2: 57    /  pO2: 145   / HCO3: 39    / Base Excess: 12.1  /  SaO2: 100                   CAPILLARY BLOOD GLUCOSE      POCT Blood Glucose.: 198 mg/dL (07 Feb 2023 07:45)  POCT Blood Glucose.: 194 mg/dL (06 Feb 2023 21:08)  POCT Blood Glucose.: 270 mg/dL (06 Feb 2023 16:38)  POCT Blood Glucose.: 252 mg/dL (06 Feb 2023 12:00)        RADIOLOGY & ADDITIONAL TESTS:    CXR:    Ct scan chest:    ekg;    echo:

## 2023-02-07 NOTE — PROGRESS NOTE ADULT - SUBJECTIVE AND OBJECTIVE BOX
Patient is seen and examined at the bed side, is afebrile. She is doing much better, family at the bed side.       REVIEW OF SYSTEMS: All other review systems are negative      ALLERGIES: Chicken (Stomach Upset)  losartan (Angioedema)      Vital Signs Last 24 Hrs  T(C): 36.8 (2023 12:07), Max: 37.1 (2023 20:14)  T(F): 98.3 (2023 12:07), Max: 98.7 (2023 20:14)  HR: 72 (2023 12:07) (69 - 98)  BP: 130/80 (2023 12:07) (125/70 - 136/98)  BP(mean): --  RR: 19 (2023 12:07) (19 - 20)  SpO2: 98% (2023 12:07) (93% - 99%)    Parameters below as of 2023 12:07  Patient On (Oxygen Delivery Method): nasal cannula  O2 Flow (L/min): 2        PHYSICAL EXAM:  GENERAL: Not in distress, on oxygen via  NC  CHEST/LUNG:  Not using accessory muscles   HEART: s1 and s2 present  ABDOMEN:  Nontender and  Nondistended  EXTREMITIES: B/L  pedal  edema  CNS: awake and alert      LABS:                           13.2   7.17  )-----------( 253      ( 2023 07:19 )             42.1                           13.6   8.25  )-----------( 263      ( 2023 07:09 )             43.7                  14.7   5.58  )-----------( 253      ( 2023 03:48 )             48.7       02-07    140  |  101  |  16  ----------------------------<  149<H>  4.3   |  34<H>  |  0.76    Ca    8.9      2023 07:19  Phos  2.3     02-07  Mg     2.3     02-07    TPro  6.2  /  Alb  2.7<L>  /  TBili  0.8  /  DBili  x   /  AST  10  /  ALT  27  /  AlkPhos  92  02-07    02-05    139  |  99  |  22<H>  ----------------------------<  152<H>  4.2   |  36<H>  |  0.86    Ca    9.2      2023 07:09  Phos  1.8     02-05  Mg     2.5     -05    TPro  6.4  /  Alb  2.7<L>  /  TBili  0.9  /  DBili  x   /  AST  12  /  ALT  26  /  AlkPhos  95  02-05    PT/INR - ( 2023 18:00 )   PT: 23.8 sec;   INR: 1.99 ratio      PTT - ( 2023 18:00 )  PTT:34.5 sec      CAPILLARY BLOOD GLUCOSE  POCT Blood Glucose.: 250 mg/dL (2023 01:11)  POCT Blood Glucose.: 176 mg/dL (2023 23:47)  POCT Blood Glucose.: 176 mg/dL (2023 16:31)        Urinalysis Basic - ( 2023 21:30 )  Color: Yellow / Appearance: very cloudy / S.010 / pH: x  Gluc: x / Ketone: Negative  / Bili: Negative / Urobili: Negative   Blood: x / Protein: 30 mg/dL / Nitrite: Negative   Leuk Esterase: Moderate / RBC: 25-50 /HPF / WBC >50 /HPF   Sq Epi: x / Non Sq Epi: Few /HPF / Bacteria: Moderate /HPF        MEDICATIONS  (STANDING):    albuterol/ipratropium for Nebulization 3 milliLiter(s) Nebulizer every 6 hours  anastrozole 1 milliGRAM(s) Oral daily  apixaban 5 milliGRAM(s) Oral every 12 hours  artificial  tears Solution 1 Drop(s) Both EYES two times a day  carvedilol 6.25 milliGRAM(s) Oral every 12 hours  chlorhexidine 2% Cloths 1 Application(s) Topical <User Schedule>  insulin lispro (ADMELOG) corrective regimen sliding scale   SubCutaneous three times a day with meals  insulin lispro (ADMELOG) corrective regimen sliding scale   SubCutaneous at bedtime  pantoprazole  Injectable 40 milliGRAM(s) IV Push daily  predniSONE   Tablet 40 milliGRAM(s) Oral daily  simvastatin 20 milliGRAM(s) Oral at bedtime          RADIOLOGY & ADDITIONAL TESTS:    23: Xray Chest 1 View- PORTABLE-Urgent (23 @ 22:00) There are increased interstitial markings which may represent the   patient's underlying COPD or pulmonary venous congestion. No focal  consolidation or gross effusion.        MICROBIOLOGY DATA:    Culture - Urine (23 @ 21:30)   Specimen Source: Clean Catch Clean Catch (Midstream)   Culture Results:   >100,000 CFU/ml Klebsiella aerogenes (Previously Enterobacter)     Culture - Blood (23 @ 18:00)   Specimen Source: .Blood Blood-Peripheral   Culture Results: No growth to date.     Culture - Blood (23 @ 17:50)   Specimen Source: .Blood Blood-Peripheral   Culture Results: No growth to date.     Flu With COVID-19 By PATRICK (23 @ 18:00)   SARS-CoV-2 Result: NotDetec                 Patient is seen and examined at the bed side, is afebrile. She is doing much better, sitting up at the edge of bed. The family at the bed side.       REVIEW OF SYSTEMS: All other review systems are negative      ALLERGIES: Chicken (Stomach Upset)  losartan (Angioedema)      Vital Signs Last 24 Hrs  T(C): 36.8 (2023 12:07), Max: 37.1 (2023 20:14)  T(F): 98.3 (2023 12:07), Max: 98.7 (2023 20:14)  HR: 72 (2023 12:07) (69 - 98)  BP: 130/80 (2023 12:07) (125/70 - 136/98)  BP(mean): --  RR: 19 (2023 12:07) (19 - 20)  SpO2: 98% (2023 12:07) (93% - 99%)    Parameters below as of 2023 12:07  Patient On (Oxygen Delivery Method): nasal cannula  O2 Flow (L/min): 2        PHYSICAL EXAM:  GENERAL: Not in distress, on oxygen via  NC  CHEST/LUNG:  Not using accessory muscles   HEART: s1 and s2 present  ABDOMEN:  Nontender and  Nondistended  EXTREMITIES: B/L  pedal  edema  CNS: awake and alert      LABS:                           13.2   7.17  )-----------( 253      ( 2023 07:19 )             42.1                           13.6   8.25  )-----------( 263      ( 2023 07:09 )             43.7             140  |  101  |  16  ----------------------------<  149<H>  4.3   |  34<H>  |  0.76    Ca    8.9      2023 07:19  Phos  2.3     -  Mg     2.3         TPro  6.2  /  Alb  2.7<L>  /  TBili  0.8  /  DBili  x   /  AST  10  /  ALT  27  /  AlkPhos  92  02-    02-    139  |  99  |  22<H>  ----------------------------<  152<H>  4.2   |  36<H>  |  0.86    Ca    9.2      2023 07:09  Phos  1.8     02-05  Mg     2.5     02-05    TPro  6.4  /  Alb  2.7<L>  /  TBili  0.9  /  DBili  x   /  AST  12  /  ALT  26  /  AlkPhos  95  02-05    PT/INR - ( 2023 18:00 )   PT: 23.8 sec;   INR: 1.99 ratio      PTT - ( 2023 18:00 )  PTT:34.5 sec      CAPILLARY BLOOD GLUCOSE  POCT Blood Glucose.: 250 mg/dL (2023 01:11)  POCT Blood Glucose.: 176 mg/dL (2023 23:47)  POCT Blood Glucose.: 176 mg/dL (2023 16:31)        Urinalysis Basic - ( 2023 21:30 )  Color: Yellow / Appearance: very cloudy / S.010 / pH: x  Gluc: x / Ketone: Negative  / Bili: Negative / Urobili: Negative   Blood: x / Protein: 30 mg/dL / Nitrite: Negative   Leuk Esterase: Moderate / RBC: 25-50 /HPF / WBC >50 /HPF   Sq Epi: x / Non Sq Epi: Few /HPF / Bacteria: Moderate /HPF        MEDICATIONS  (STANDING):    albuterol/ipratropium for Nebulization 3 milliLiter(s) Nebulizer every 6 hours  anastrozole 1 milliGRAM(s) Oral daily  apixaban 5 milliGRAM(s) Oral every 12 hours  artificial  tears Solution 1 Drop(s) Both EYES two times a day  carvedilol 6.25 milliGRAM(s) Oral every 12 hours  chlorhexidine 2% Cloths 1 Application(s) Topical <User Schedule>  insulin lispro (ADMELOG) corrective regimen sliding scale   SubCutaneous three times a day with meals  insulin lispro (ADMELOG) corrective regimen sliding scale   SubCutaneous at bedtime  pantoprazole  Injectable 40 milliGRAM(s) IV Push daily  predniSONE   Tablet 40 milliGRAM(s) Oral daily  simvastatin 20 milliGRAM(s) Oral at bedtime          RADIOLOGY & ADDITIONAL TESTS:    23: Xray Chest 1 View- PORTABLE-Urgent (23 @ 22:00) There are increased interstitial markings which may represent the   patient's underlying COPD or pulmonary venous congestion. No focal  consolidation or gross effusion.        MICROBIOLOGY DATA:    Culture - Urine (23 @ 21:30)   Specimen Source: Clean Catch Clean Catch (Midstream)   Culture Results:   >100,000 CFU/ml Klebsiella aerogenes (Previously Enterobacter)     Culture - Blood (23 @ 18:00)   Specimen Source: .Blood Blood-Peripheral   Culture Results: No growth to date.     Culture - Blood (23 @ 17:50)   Specimen Source: .Blood Blood-Peripheral   Culture Results: No growth to date.     Flu With COVID-19 By PATRICK (23 @ 18:00)   SARS-CoV-2 Result: NotDetec

## 2023-02-07 NOTE — PROGRESS NOTE ADULT - PROBLEM SELECTOR PLAN 3
Exacerbation resolving.  Continue AVAPS nightly and as needed during the day. Oxygen support when not on AVAPS  Remains hypercapnic despite AVAPS usage. PCO2 on AVAPS 57-75. PCO2 on BIPAP 85. Failed BIAP and AVAPS therapy. Patient will need AVAPS-AE/Trilogy at home to manage persistent hypercapnia. Without AVAPS-AE/Trilogy patient is a high risk for readmission and intubation.   Continue prednisone and bronchodilators.

## 2023-02-07 NOTE — PROGRESS NOTE ADULT - SUBJECTIVE AND OBJECTIVE BOX
NUNO LAST    SCU progress note    INTERVAL HPI/OVERNIGHT EVENTS: ***Tolerated AVAPS well last night. Remain hypercapnic despite AVAPS usage. Will need trilogy/AVAPS-AE at home    DNR [ ]   DNI  [  ]   FULL CODE    Covid - 19 PCR: Negative 2/3    The 4Ms    What Matters Most: see GOC  Age appropriate Medications/Screen for High Risk Medication: Yes  Mentation: see CAM below  Mobility: defer to physical exam    The Confusion Assessment Method (CAM) Diagnostic Algorithm     1: Acute Onset or Fluctuating Course  - Is there evidence of an acute change in mental status from the patient’s baseline? Did the (abnormal) behavior  fluctuate during the day, that is, tend to come and go, or increase and decrease in severity?  [ ] YES [x ] NO     2: Inattention  - Did the patient have difficulty focusing attention, being easily distractible, or having difficulty keeping track of what was being said?  [ ] YES [ x] NO     3: Disorganized thinking  -Was the patient’s thinking disorganized or incoherent, such as rambling or irrelevant conversation, unclear or illogical flow of ideas, or unpredictable switching from subject to subject?  [ ] YES [x ] NO    4: Altered Level of consciousness?  [ ] YES [x ] NO    The diagnosis of delirium by CAM requires the presence of features 1 and 2 and either 3 or 4.    PRESSORS: [ ] YES [x ] NO    Cardiovascular:  Heart Failure  Acute   Acute on Chronic  Chronic       carvedilol 6.25 milliGRAM(s) Oral every 12 hours    Pulmonary:  albuterol/ipratropium for Nebulization 3 milliLiter(s) Nebulizer every 6 hours  guaiFENesin Oral Liquid (Sugar-Free) 200 milliGRAM(s) Oral every 6 hours PRN    Hematalogic:  apixaban 5 milliGRAM(s) Oral every 12 hours    Other:  anastrozole 1 milliGRAM(s) Oral daily  artificial  tears Solution 1 Drop(s) Both EYES two times a day  chlorhexidine 2% Cloths 1 Application(s) Topical <User Schedule>  insulin lispro (ADMELOG) corrective regimen sliding scale   SubCutaneous three times a day with meals  insulin lispro (ADMELOG) corrective regimen sliding scale   SubCutaneous at bedtime  pantoprazole  Injectable 40 milliGRAM(s) IV Push daily  predniSONE   Tablet 40 milliGRAM(s) Oral daily  simvastatin 20 milliGRAM(s) Oral at bedtime    albuterol/ipratropium for Nebulization 3 milliLiter(s) Nebulizer every 6 hours  anastrozole 1 milliGRAM(s) Oral daily  apixaban 5 milliGRAM(s) Oral every 12 hours  artificial  tears Solution 1 Drop(s) Both EYES two times a day  carvedilol 6.25 milliGRAM(s) Oral every 12 hours  chlorhexidine 2% Cloths 1 Application(s) Topical <User Schedule>  guaiFENesin Oral Liquid (Sugar-Free) 200 milliGRAM(s) Oral every 6 hours PRN  insulin lispro (ADMELOG) corrective regimen sliding scale   SubCutaneous three times a day with meals  insulin lispro (ADMELOG) corrective regimen sliding scale   SubCutaneous at bedtime  pantoprazole  Injectable 40 milliGRAM(s) IV Push daily  predniSONE   Tablet 40 milliGRAM(s) Oral daily  simvastatin 20 milliGRAM(s) Oral at bedtime    Drug Dosing Weight  Height (cm): 154.9 (30 Jan 2023 16:23)  Weight (kg): 120 (03 Feb 2023 15:15)  BMI (kg/m2): 50 (03 Feb 2023 15:15)  BSA (m2): 2.13 (03 Feb 2023 15:15)    CENTRAL LINE: [ ] YES [x ] NO  LOCATION:   DATE INSERTED:  REMOVE: [ ] YES [ ] NO  EXPLAIN:    AGUIRRE: [ ] YES [x ] NO    DATE INSERTED:  REMOVE:  [ ] YES [ ] NO  EXPLAIN:    PAST MEDICAL & SURGICAL HISTORY:  Arthritis      Legally blind      Pre-diabetes      Breast cancer  right, no chemo or radiation      COPD exacerbation      Atrial fibrillation      HF (heart failure)  HFpEF 7/29      HTN (hypertension)      Deep vein thrombosis (DVT)  Left Lower Extremity      H/O CHF      No significant past surgical history            ABG - ( 07 Feb 2023 05:54 )  pH, Arterial: 7.44  pH, Blood: x     /  pCO2: 57    /  pO2: 145   / HCO3: 39    / Base Excess: 12.1  /  SaO2: 100                         PHYSICAL EXAM:    GENERAL: NAD, obese female  HEAD:  Atraumatic, Normocephalic  EYES: EOMI, PERRLA,   ENMT: No tonsillar erythema, exudates  NECK: Supple, No JVD  NERVOUS SYSTEM:  Alert & Oriented X3, follows commands, moving all extremities  CHEST/LUNG: Diminished breath sounds bilateral bases.  HEART: Regular rate and rhythm; No murmurs, rubs, or gallops  ABDOMEN: Soft, Obese, Nontender, Nondistended; Bowel sounds present  EXTREMITIES: +1 edema bilateral lower extremities.  2+ Peripheral Pulses, No clubbing, cyanosis  LYMPH: No lymphadenopathy noted  SKIN: No rashes or lesions      LABS:  CBC Full  -  ( 07 Feb 2023 07:19 )  WBC Count : 7.17 K/uL  RBC Count : 4.27 M/uL  Hemoglobin : 13.2 g/dL  Hematocrit : 42.1 %  Platelet Count - Automated : 253 K/uL  Mean Cell Volume : 98.6 fl  Mean Cell Hemoglobin : 30.9 pg  Mean Cell Hemoglobin Concentration : 31.4 gm/dL  Auto Neutrophil # : x  Auto Lymphocyte # : x  Auto Monocyte # : x  Auto Eosinophil # : x  Auto Basophil # : x  Auto Neutrophil % : x  Auto Lymphocyte % : x  Auto Monocyte % : x  Auto Eosinophil % : x  Auto Basophil % : x    02-07    140  |  101  |  16  ----------------------------<  149<H>  4.3   |  34<H>  |  0.76    Ca    8.9      07 Feb 2023 07:19  Phos  2.3     02-07  Mg     2.3     02-07    TPro  6.2  /  Alb  2.7<L>  /  TBili  0.8  /  DBili  x   /  AST  10  /  ALT  27  /  AlkPhos  92  02-07              [  ]  DVT Prophylaxis  [  ]  Nutrition, Brand, Rate         Goal Rate        Abnormal Nutritional Status -  Malnutrition   Cachexia      Morbid Obesity BMI >/=40    RADIOLOGY & ADDITIONAL STUDIES:  ***  < from: CT Chest No Cont (01.31.23 @ 16:36) >  EXAMINATION: CT CHEST was performed without IV contrast.    COMPARISON: 7/28/2022.    FINDINGS:    Image quality degraded due to extensive respiratory motion.    AIRWAYS, LUNGS, PLEURA: Trachea and mainstem bronchi patent. Biapical   scarring unchanged. Bibasilar atelectasis. No focal lung consolidation.   No pleural effusion.    MEDIASTINUM: Cardiomegaly. No pericardial effusion. Thoracic aorta normal   caliber.  No large mediastinal lymph nodes.    IMAGED ABDOMEN: Unremarkable.    SOFT TISSUES: Unremarkable.    BONES: Unremarkable.      IMPRESSION:.    No focal lung consolidation.    Bibasilar atelectasis.    --- End of Report ---        < end of copied text >    Goals of Care Discussion with Family/Proxy/Other   - see note from 2/04/23

## 2023-02-07 NOTE — PROGRESS NOTE ADULT - PROBLEM SELECTOR PLAN 6
Detail Level: Zone Detail Level: Detailed A1C 7.4. Not on home regimen  Continue sliding scale coverage  Monitor glucose.

## 2023-02-08 LAB
ALBUMIN SERPL ELPH-MCNC: 2.8 G/DL — LOW (ref 3.5–5)
ALP SERPL-CCNC: 99 U/L — SIGNIFICANT CHANGE UP (ref 40–120)
ALT FLD-CCNC: 35 U/L DA — SIGNIFICANT CHANGE UP (ref 10–60)
ANION GAP SERPL CALC-SCNC: 6 MMOL/L — SIGNIFICANT CHANGE UP (ref 5–17)
AST SERPL-CCNC: 17 U/L — SIGNIFICANT CHANGE UP (ref 10–40)
BILIRUB SERPL-MCNC: 0.8 MG/DL — SIGNIFICANT CHANGE UP (ref 0.2–1.2)
BUN SERPL-MCNC: 14 MG/DL — SIGNIFICANT CHANGE UP (ref 7–18)
CALCIUM SERPL-MCNC: 8.8 MG/DL — SIGNIFICANT CHANGE UP (ref 8.4–10.5)
CHLORIDE SERPL-SCNC: 100 MMOL/L — SIGNIFICANT CHANGE UP (ref 96–108)
CO2 SERPL-SCNC: 33 MMOL/L — HIGH (ref 22–31)
CREAT SERPL-MCNC: 0.65 MG/DL — SIGNIFICANT CHANGE UP (ref 0.5–1.3)
EGFR: 83 ML/MIN/1.73M2 — SIGNIFICANT CHANGE UP
GLUCOSE BLDC GLUCOMTR-MCNC: 145 MG/DL — HIGH (ref 70–99)
GLUCOSE BLDC GLUCOMTR-MCNC: 161 MG/DL — HIGH (ref 70–99)
GLUCOSE BLDC GLUCOMTR-MCNC: 219 MG/DL — HIGH (ref 70–99)
GLUCOSE BLDC GLUCOMTR-MCNC: 287 MG/DL — HIGH (ref 70–99)
GLUCOSE SERPL-MCNC: 151 MG/DL — HIGH (ref 70–99)
HCT VFR BLD CALC: 42.1 % — SIGNIFICANT CHANGE UP (ref 34.5–45)
HGB BLD-MCNC: 13.3 G/DL — SIGNIFICANT CHANGE UP (ref 11.5–15.5)
MAGNESIUM SERPL-MCNC: 2.4 MG/DL — SIGNIFICANT CHANGE UP (ref 1.6–2.6)
MCHC RBC-ENTMCNC: 30.9 PG — SIGNIFICANT CHANGE UP (ref 27–34)
MCHC RBC-ENTMCNC: 31.6 GM/DL — LOW (ref 32–36)
MCV RBC AUTO: 97.7 FL — SIGNIFICANT CHANGE UP (ref 80–100)
NRBC # BLD: 0 /100 WBCS — SIGNIFICANT CHANGE UP (ref 0–0)
PHOSPHATE SERPL-MCNC: 2.9 MG/DL — SIGNIFICANT CHANGE UP (ref 2.5–4.5)
PLATELET # BLD AUTO: 250 K/UL — SIGNIFICANT CHANGE UP (ref 150–400)
POTASSIUM SERPL-MCNC: 4.3 MMOL/L — SIGNIFICANT CHANGE UP (ref 3.5–5.3)
POTASSIUM SERPL-SCNC: 4.3 MMOL/L — SIGNIFICANT CHANGE UP (ref 3.5–5.3)
PROT SERPL-MCNC: 6.1 G/DL — SIGNIFICANT CHANGE UP (ref 6–8.3)
RBC # BLD: 4.31 M/UL — SIGNIFICANT CHANGE UP (ref 3.8–5.2)
RBC # FLD: 13.3 % — SIGNIFICANT CHANGE UP (ref 10.3–14.5)
SODIUM SERPL-SCNC: 139 MMOL/L — SIGNIFICANT CHANGE UP (ref 135–145)
WBC # BLD: 7.77 K/UL — SIGNIFICANT CHANGE UP (ref 3.8–10.5)
WBC # FLD AUTO: 7.77 K/UL — SIGNIFICANT CHANGE UP (ref 3.8–10.5)

## 2023-02-08 RX ADMIN — Medication 3: at 12:31

## 2023-02-08 RX ADMIN — Medication 2: at 17:29

## 2023-02-08 RX ADMIN — Medication 3 MILLILITER(S): at 15:42

## 2023-02-08 RX ADMIN — CHLORHEXIDINE GLUCONATE 1 APPLICATION(S): 213 SOLUTION TOPICAL at 05:58

## 2023-02-08 RX ADMIN — APIXABAN 5 MILLIGRAM(S): 2.5 TABLET, FILM COATED ORAL at 17:30

## 2023-02-08 RX ADMIN — Medication 3 MILLILITER(S): at 09:16

## 2023-02-08 RX ADMIN — CARVEDILOL PHOSPHATE 6.25 MILLIGRAM(S): 80 CAPSULE, EXTENDED RELEASE ORAL at 05:52

## 2023-02-08 RX ADMIN — Medication 1 DROP(S): at 05:59

## 2023-02-08 RX ADMIN — Medication 40 MILLIGRAM(S): at 05:52

## 2023-02-08 RX ADMIN — PANTOPRAZOLE SODIUM 40 MILLIGRAM(S): 20 TABLET, DELAYED RELEASE ORAL at 12:31

## 2023-02-08 RX ADMIN — Medication 3 MILLILITER(S): at 03:36

## 2023-02-08 RX ADMIN — CARVEDILOL PHOSPHATE 6.25 MILLIGRAM(S): 80 CAPSULE, EXTENDED RELEASE ORAL at 17:29

## 2023-02-08 RX ADMIN — Medication 3 MILLILITER(S): at 20:12

## 2023-02-08 RX ADMIN — Medication 1 DROP(S): at 17:30

## 2023-02-08 RX ADMIN — ANASTROZOLE 1 MILLIGRAM(S): 1 TABLET ORAL at 12:32

## 2023-02-08 RX ADMIN — APIXABAN 5 MILLIGRAM(S): 2.5 TABLET, FILM COATED ORAL at 05:52

## 2023-02-08 RX ADMIN — SIMVASTATIN 20 MILLIGRAM(S): 20 TABLET, FILM COATED ORAL at 21:58

## 2023-02-08 NOTE — PROGRESS NOTE ADULT - SUBJECTIVE AND OBJECTIVE BOX
Patient is seen and examined at the bed side, is afebrile. She is doing much better, sitting up at the edge of bed. The family at the bed side.       REVIEW OF SYSTEMS: All other review systems are negative      ALLERGIES: Chicken (Stomach Upset)  losartan (Angioedema)      Vital Signs Last 24 Hrs  T(C): 36.7 (2023 12:48), Max: 37 (2023 17:00)  T(F): 98 (2023 12:48), Max: 98.6 (2023 17:00)  HR: 75 (2023 12:48) (62 - 117)  BP: 121/75 (2023 12:48) (121/75 - 148/99)  BP(mean): --  RR: 18 (2023 12:48) (18 - 20)  SpO2: 100% (2023 12:48) (97% - 100%)    Parameters below as of 2023 12:48  Patient On (Oxygen Delivery Method): nasal cannula    O2 Concentration (%): 2      PHYSICAL EXAM:  GENERAL: Not in distress, on oxygen via  NC  CHEST/LUNG:  Not using accessory muscles   HEART: s1 and s2 present  ABDOMEN:  Nontender and  Nondistended  EXTREMITIES: B/L  pedal  edema  CNS: awake and alert      LABS:                            13.3   7.77  )-----------( 250      ( 2023 08:04 )             42.1                        13.2   7.17  )-----------( 253      ( 2023 07:19 )             42.1       02-08    139  |  100  |  14  ----------------------------<  151<H>  4.3   |  33<H>  |  0.65    Ca    8.8      2023 08:04  Phos  2.9     02-08  Mg     2.4     02-08    TPro  6.1  /  Alb  2.8<L>  /  TBili  0.8  /  DBili  x   /  AST  17  /  ALT  35  /  AlkPhos  99  02-08    02-07    140  |  101  |  16  ----------------------------<  149<H>  4.3   |  34<H>  |  0.76    Ca    8.9      2023 07:19  Phos  2.3     02-07  Mg     2.3     02-07    TPro  6.2  /  Alb  2.7<L>  /  TBili  0.8  /  DBili  x   /  AST  10  /  ALT  27  /  AlkPhos  92  02-07  PT/INR - ( 2023 18:00 )   PT: 23.8 sec;   INR: 1.99 ratio      PTT - ( 2023 18:00 )  PTT:34.5 sec      CAPILLARY BLOOD GLUCOSE  POCT Blood Glucose.: 250 mg/dL (2023 01:11)  POCT Blood Glucose.: 176 mg/dL (2023 23:47)  POCT Blood Glucose.: 176 mg/dL (2023 16:31)        Urinalysis Basic - ( 2023 21:30 )  Color: Yellow / Appearance: very cloudy / S.010 / pH: x  Gluc: x / Ketone: Negative  / Bili: Negative / Urobili: Negative   Blood: x / Protein: 30 mg/dL / Nitrite: Negative   Leuk Esterase: Moderate / RBC: 25-50 /HPF / WBC >50 /HPF   Sq Epi: x / Non Sq Epi: Few /HPF / Bacteria: Moderate /HPF        MEDICATIONS  (STANDING):    albuterol/ipratropium for Nebulization 3 milliLiter(s) Nebulizer every 6 hours  anastrozole 1 milliGRAM(s) Oral daily  apixaban 5 milliGRAM(s) Oral every 12 hours  artificial  tears Solution 1 Drop(s) Both EYES two times a day  carvedilol 6.25 milliGRAM(s) Oral every 12 hours  chlorhexidine 2% Cloths 1 Application(s) Topical <User Schedule>  insulin lispro (ADMELOG) corrective regimen sliding scale   SubCutaneous three times a day with meals  insulin lispro (ADMELOG) corrective regimen sliding scale   SubCutaneous at bedtime  pantoprazole  Injectable 40 milliGRAM(s) IV Push daily  predniSONE   Tablet 40 milliGRAM(s) Oral daily  simvastatin 20 milliGRAM(s) Oral at bedtime        RADIOLOGY & ADDITIONAL TESTS:    23: Xray Chest 1 View- PORTABLE-Urgent (23 @ 22:00) There are increased interstitial markings which may represent the   patient's underlying COPD or pulmonary venous congestion. No focal  consolidation or gross effusion.        MICROBIOLOGY DATA:    Culture - Urine (23 @ 21:30)   Specimen Source: Clean Catch Clean Catch (Midstream)   Culture Results:   >100,000 CFU/ml Klebsiella aerogenes (Previously Enterobacter)     Culture - Blood (23 @ 18:00)   Specimen Source: .Blood Blood-Peripheral   Culture Results: No growth to date.     Culture - Blood (23 @ 17:50)   Specimen Source: .Blood Blood-Peripheral   Culture Results: No growth to date.     Flu With COVID-19 By PATRICK (23 @ 18:00)   SARS-CoV-2 Result: NotDetec                 Patient is seen and examined at the bed side, is afebrile.  She has no new complaints. The family at the bed side.       REVIEW OF SYSTEMS: All other review systems are negative      ALLERGIES: Chicken (Stomach Upset)  losartan (Angioedema)      Vital Signs Last 24 Hrs  T(C): 36.7 (2023 12:48), Max: 37 (2023 17:00)  T(F): 98 (2023 12:48), Max: 98.6 (2023 17:00)  HR: 75 (2023 12:48) (62 - 117)  BP: 121/75 (2023 12:48) (121/75 - 148/99)  BP(mean): --  RR: 18 (2023 12:48) (18 - 20)  SpO2: 100% (2023 12:48) (97% - 100%)    Parameters below as of 2023 12:48  Patient On (Oxygen Delivery Method): nasal cannula    O2 Concentration (%): 2      PHYSICAL EXAM:  GENERAL: Not in distress, on oxygen via  NC  CHEST/LUNG:  Not using accessory muscles   HEART: s1 and s2 present  ABDOMEN:  Nontender and  Nondistended  EXTREMITIES: B/L  pedal  edema  CNS: awake and alert      LABS:                            13.3   7.77  )-----------( 250      ( 2023 08:04 )             42.1                        13.2   7.17  )-----------( 253      ( 2023 07:19 )             42.1       02-08    139  |  100  |  14  ----------------------------<  151<H>  4.3   |  33<H>  |  0.65    Ca    8.8      2023 08:04  Phos  2.9     02-08  Mg     2.4     02-08    TPro  6.1  /  Alb  2.8<L>  /  TBili  0.8  /  DBili  x   /  AST  17  /  ALT  35  /  AlkPhos  99  02-08    02-07    140  |  101  |  16  ----------------------------<  149<H>  4.3   |  34<H>  |  0.76    Ca    8.9      2023 07:19  Phos  2.3     02-07  Mg     2.3     02-07    TPro  6.2  /  Alb  2.7<L>  /  TBili  0.8  /  DBili  x   /  AST  10  /  ALT  27  /  AlkPhos  92  02-07  PT/INR - ( 2023 18:00 )   PT: 23.8 sec;   INR: 1.99 ratio      PTT - ( 2023 18:00 )  PTT:34.5 sec      CAPILLARY BLOOD GLUCOSE  POCT Blood Glucose.: 250 mg/dL (2023 01:11)  POCT Blood Glucose.: 176 mg/dL (2023 23:47)  POCT Blood Glucose.: 176 mg/dL (2023 16:31)        Urinalysis Basic - ( 2023 21:30 )  Color: Yellow / Appearance: very cloudy / S.010 / pH: x  Gluc: x / Ketone: Negative  / Bili: Negative / Urobili: Negative   Blood: x / Protein: 30 mg/dL / Nitrite: Negative   Leuk Esterase: Moderate / RBC: 25-50 /HPF / WBC >50 /HPF   Sq Epi: x / Non Sq Epi: Few /HPF / Bacteria: Moderate /HPF        MEDICATIONS  (STANDING):    albuterol/ipratropium for Nebulization 3 milliLiter(s) Nebulizer every 6 hours  anastrozole 1 milliGRAM(s) Oral daily  apixaban 5 milliGRAM(s) Oral every 12 hours  artificial  tears Solution 1 Drop(s) Both EYES two times a day  carvedilol 6.25 milliGRAM(s) Oral every 12 hours  chlorhexidine 2% Cloths 1 Application(s) Topical <User Schedule>  insulin lispro (ADMELOG) corrective regimen sliding scale   SubCutaneous three times a day with meals  insulin lispro (ADMELOG) corrective regimen sliding scale   SubCutaneous at bedtime  pantoprazole  Injectable 40 milliGRAM(s) IV Push daily  predniSONE   Tablet 40 milliGRAM(s) Oral daily  simvastatin 20 milliGRAM(s) Oral at bedtime        RADIOLOGY & ADDITIONAL TESTS:    23: Xray Chest 1 View- PORTABLE-Urgent (23 @ 22:00) There are increased interstitial markings which may represent the   patient's underlying COPD or pulmonary venous congestion. No focal  consolidation or gross effusion.        MICROBIOLOGY DATA:    Culture - Urine (23 @ 21:30)   Specimen Source: Clean Catch Clean Catch (Midstream)   Culture Results:   >100,000 CFU/ml Klebsiella aerogenes (Previously Enterobacter)     Culture - Blood (23 @ 18:00)   Specimen Source: .Blood Blood-Peripheral   Culture Results: No growth to date.     Culture - Blood (23 @ 17:50)   Specimen Source: .Blood Blood-Peripheral   Culture Results: No growth to date.     Flu With COVID-19 By PATRICK (23 @ 18:00)   SARS-CoV-2 Result: NotDetec

## 2023-02-08 NOTE — PROGRESS NOTE ADULT - SUBJECTIVE AND OBJECTIVE BOX
NUNO LAST    SCU progress note    INTERVAL HPI/OVERNIGHT EVENTS: No acute events.     FULL CODE    Covid - 19 PCR:     The 4Ms    What Matters Most: see GOC  Age appropriate Medications/Screen for High Risk Medication: Yes  Mentation: see CAM below  Mobility: defer to physical exam    The Confusion Assessment Method (CAM) Diagnostic Algorithm     1: Acute Onset or Fluctuating Course  - Is there evidence of an acute change in mental status from the patient’s baseline? Did the (abnormal) behavior  fluctuate during the day, that is, tend to come and go, or increase and decrease in severity?  [ ] YES [x ] NO     2: Inattention  - Did the patient have difficulty focusing attention, being easily distractible, or having difficulty keeping track of what was being said?  [ ] YES [x ] NO     3: Disorganized thinking  -Was the patient’s thinking disorganized or incoherent, such as rambling or irrelevant conversation, unclear or illogical flow of ideas, or unpredictable switching from subject to subject?  [ ] YES [x ] NO    4: Altered Level of consciousness?  [ ] YES [x ] NO    The diagnosis of delirium by CAM requires the presence of features 1 and 2 and either 3 or 4.    PRESSORS: [ ] YES [x ] NO    Cardiovascular:  Heart Failure  Acute   Acute on Chronic  Chronic       carvedilol 6.25 milliGRAM(s) Oral every 12 hours    Pulmonary:  albuterol/ipratropium for Nebulization 3 milliLiter(s) Nebulizer every 6 hours  guaiFENesin Oral Liquid (Sugar-Free) 200 milliGRAM(s) Oral every 6 hours PRN    Hematalogic:  apixaban 5 milliGRAM(s) Oral every 12 hours    Other:  anastrozole 1 milliGRAM(s) Oral daily  artificial  tears Solution 1 Drop(s) Both EYES two times a day  chlorhexidine 2% Cloths 1 Application(s) Topical <User Schedule>  insulin lispro (ADMELOG) corrective regimen sliding scale   SubCutaneous three times a day with meals  insulin lispro (ADMELOG) corrective regimen sliding scale   SubCutaneous at bedtime  pantoprazole  Injectable 40 milliGRAM(s) IV Push daily  predniSONE   Tablet 40 milliGRAM(s) Oral daily  simvastatin 20 milliGRAM(s) Oral at bedtime    albuterol/ipratropium for Nebulization 3 milliLiter(s) Nebulizer every 6 hours  anastrozole 1 milliGRAM(s) Oral daily  apixaban 5 milliGRAM(s) Oral every 12 hours  artificial  tears Solution 1 Drop(s) Both EYES two times a day  carvedilol 6.25 milliGRAM(s) Oral every 12 hours  chlorhexidine 2% Cloths 1 Application(s) Topical <User Schedule>  guaiFENesin Oral Liquid (Sugar-Free) 200 milliGRAM(s) Oral every 6 hours PRN  insulin lispro (ADMELOG) corrective regimen sliding scale   SubCutaneous three times a day with meals  insulin lispro (ADMELOG) corrective regimen sliding scale   SubCutaneous at bedtime  pantoprazole  Injectable 40 milliGRAM(s) IV Push daily  predniSONE   Tablet 40 milliGRAM(s) Oral daily  simvastatin 20 milliGRAM(s) Oral at bedtime    Drug Dosing Weight  Height (cm): 154.9 (30 Jan 2023 16:23)  Weight (kg): 120 (03 Feb 2023 15:15)  BMI (kg/m2): 50 (03 Feb 2023 15:15)  BSA (m2): 2.13 (03 Feb 2023 15:15)    CENTRAL LINE: [ ] YES [x ] NO  LOCATION:   DATE INSERTED:  REMOVE: [ ] YES [ ] NO  EXPLAIN:    AGUIRRE: [ ] YES [ x] NO    DATE INSERTED:  REMOVE:  [ ] YES [ ] NO  EXPLAIN:    PAST MEDICAL & SURGICAL HISTORY:  Arthritis      Legally blind      Pre-diabetes      Breast cancer  right, no chemo or radiation      COPD exacerbation      Atrial fibrillation      HF (heart failure)  HFpEF 7/29      HTN (hypertension)      Deep vein thrombosis (DVT)  Left Lower Extremity      H/O CHF      No significant past surgical history    ABG - ( 07 Feb 2023 05:54 )  pH, Arterial: 7.44  pH, Blood: x     /  pCO2: 57    /  pO2: 145   / HCO3: 39    / Base Excess: 12.1  /  SaO2: 100         02-07 @ 07:01  -  02-08 @ 07:00  --------------------------------------------------------  IN: 0 mL / OUT: 900 mL / NET: -900 mL      PHYSICAL EXAM:    GENERAL: NAD, morbidly obese.   HEAD:  Atraumatic, Normocephalic  EYES: EOMI, PERRLA, conjunctiva and sclera clear.   ENMT: Moist mucous membranes,  No lesions  NECK: Supple, No JVD  NERVOUS SYSTEM:  Alert & Oriented X3, Good concentration;   CHEST/LUNG: Diminished at the bases with expiratory wheezing DEBORAH.  No rales, rhonchi, or rubs.   HEART: Regular rate and rhythm; No murmurs, rubs, or gallops  ABDOMEN: Soft, Nontender, Nondistended; Bowel sounds present  EXTREMITIES:  2+ Peripheral Pulses, No clubbing, cyanosis, or + 2 edema ankles b/l   LYMPH: No lymphadenopathy noted  SKIN: No rashes or lesions      LABS:  CBC Full  -  ( 08 Feb 2023 08:04 )  WBC Count : 7.77 K/uL  RBC Count : 4.31 M/uL  Hemoglobin : 13.3 g/dL  Hematocrit : 42.1 %  Platelet Count - Automated : 250 K/uL  Mean Cell Volume : 97.7 fl  Mean Cell Hemoglobin : 30.9 pg  Mean Cell Hemoglobin Concentration : 31.6 gm/dL  Auto Neutrophil # : x  Auto Lymphocyte # : x  Auto Monocyte # : x  Auto Eosinophil # : x  Auto Basophil # : x  Auto Neutrophil % : x  Auto Lymphocyte % : x  Auto Monocyte % : x  Auto Eosinophil % : x  Auto Basophil % : x    02-08    139  |  100  |  14  ----------------------------<  151<H>  4.3   |  33<H>  |  0.65    Ca    8.8      08 Feb 2023 08:04  Phos  2.9     02-08  Mg     2.4     02-08    TPro  6.1  /  Alb  2.8<L>  /  TBili  0.8  /  DBili  x   /  AST  17  /  ALT  35  /  AlkPhos  99  02-08    [ x ]  DVT Prophylaxis    RADIOLOGY & ADDITIONAL STUDIES:     < from: CT Chest No Cont (01.31.23 @ 16:36) >  ACC: [id number] EXAM:  CT CHEST   ORDERED BY: FIONA PRETTY     PROCEDURE DATE:  01/31/2023          INTERPRETATION:  HISTORY: Admitting Dxs: J44.1 CHRONIC OBSTRUCTIVE   PULMONARY DISEASE W (ACUTE) EXACERBATION    EXAMINATION: CT CHEST was performed without IV contrast.    COMPARISON: 7/28/2022.    FINDINGS:    Image quality degraded due to extensive respiratory motion.    AIRWAYS, LUNGS, PLEURA: Trachea and mainstem bronchi patent. Biapical   scarring unchanged. Bibasilar atelectasis. No focal lung consolidation.   No pleural effusion.    MEDIASTINUM: Cardiomegaly. No pericardial effusion. Thoracic aorta normal   caliber.  No large mediastinal lymph nodes.    IMAGED ABDOMEN: Unremarkable.    SOFT TISSUES: Unremarkable.    BONES: Unremarkable.      IMPRESSION:.    No focal lung consolidation.    Bibasilar atelectasis.    --- End of Report ---             TOM COATES MD; Attending Radiologist  This document has been electronically signed. Jan 31 2023  4:45PM    < end of copied text >      Discussed plan of care with intensivist and patient.

## 2023-02-08 NOTE — PROGRESS NOTE ADULT - ASSESSMENT
Patient is a 89y old  Female from home, ambulates with RW, with PMHx HTN, COPD, Afib, HFpEF (7/29/22 EF 55-60%, LVH, GIDD, mild pulmonary HTN), Breast CA, and remote h/o LLE DVT, now presents to the ER for evaluation of cough and shortness of breath. She has had dry cough for over a week which became productive and associated with wheezing over the last few days. Pts O2 saturation has been fluctuating in the 80s over the past week and this morning was in the high 70s, per granddaughters.  Pt was taken to PCP Dr. Bianchi's office who sent her to the ER for admission.. On admission, she has no fever, no Leukocytosis, but found to have positive Urine analysis and negative CXR. She has started on Ceftriaxone and The ID consult requested to assist with further evaluation and antibiotic management.    # Metabolic encephalopathy - resolved  # UTI  - Urine Cx grew Klebsiella- s/p completed the course of Ceftriaxone   # Hypoxia- on supplemental oxygenation  # Metabolic encephalopathy  # Hypercapnic Respiratory failure- on BIPAP- in ICU - 2/3/23- transferred out of ICU 2/4/23    would recommend:    1. PT/OOB to chair   2. Monitor OFF Abx as she completed the course  3. Management orf BIPAP as per  protocol   4. Diuresis as per Primary team   5. Supplemental oxygenation and bronchodilator as needed    d/w  family at the bed side regarding treatment plan and prognosis,     Attending Attestation:    Spent more than 35 minutes on total encounter, more than 50 % of the visit was spent counseling and/or coordinating care by the Attending physician.  '  Complexity:    # Metabolic encephalopathy - resolved  # UTI  # Hypoxia- on supplemental oxygenation Patient is a 89y old  Female from home, ambulates with RW, with PMHx HTN, COPD, Afib, HFpEF (7/29/22 EF 55-60%, LVH, GIDD, mild pulmonary HTN), Breast CA, and remote h/o LLE DVT, now presents to the ER for evaluation of cough and shortness of breath. She has had dry cough for over a week which became productive and associated with wheezing over the last few days. Pts O2 saturation has been fluctuating in the 80s over the past week and this morning was in the high 70s, per granddaughters.  Pt was taken to PCP Dr. Bianchi's office who sent her to the ER for admission.. On admission, she has no fever, no Leukocytosis, but found to have positive Urine analysis and negative CXR. She has started on Ceftriaxone and The ID consult requested to assist with further evaluation and antibiotic management.    # Metabolic encephalopathy - resolved  # UTI  - Urine Cx grew Klebsiella- s/p completed the course of Ceftriaxone   # Hypoxia- on supplemental oxygenation  # Metabolic encephalopathy  # Hypercapnic Respiratory failure- on BIPAP- in ICU - 2/3/23- transferred out of ICU 2/4/23    would recommend:    1. Supplemental oxygenation and bronchodilator as needed    2. Monitor OFF Abx as she completed the course  3. Management orf BIPAP as per  protocol   4. Diuresis as per Primary team   5. PT/OOB to chair     d/w  family at the bed side regarding treatment plan and prognosis,     Attending Attestation:    Spent more than 35 minutes on total encounter, more than 50 % of the visit was spent counseling and/or coordinating care by the Attending physician.  '  Complexity:    # Metabolic encephalopathy - resolved  # UTI  # Hypoxia- on supplemental oxygenation

## 2023-02-08 NOTE — PROGRESS NOTE ADULT - ASSESSMENT
89 yo F, from home, with PMHx HTN, COPD, Afib, HFpEF (7/29/22 EF 55-60%, LVH, GIDD, Breast CA, and remote h/o LLE DVT p/w SOB and hypoxia to high 70s, admitted to medicine for AHRF 2/2 Acute diastolic CHF +/- COPD exacerbation and UTI (completed treatment). ICU consulted for concerns of AMS in the setting of Acute on chronic compensated respiratory acidosis requiring new trial of Bipap.  Patient downgraded to AI for further management. Currently on 2L NC. Will need BiPAP Q HS.   2/5 Change to AVAPS setting qHS.   02/08: patient medically optimized for discharge. Plan to d/c to home with AVAPS. On 2L NC and tolerating well. Awaiting for AVAPS machine. CM following.

## 2023-02-08 NOTE — PROGRESS NOTE ADULT - SUBJECTIVE AND OBJECTIVE BOX
Patient was seen and examined  Patient is a 89y old  Female who presents with a chief complaint of AHRF (08 Feb 2023 13:14)      INTERVAL HPI/OVERNIGHT EVENTS:  T(C): 36.7 (02-08-23 @ 12:48), Max: 37 (02-07-23 @ 17:00)  HR: 75 (02-08-23 @ 12:48) (62 - 117)  BP: 121/75 (02-08-23 @ 12:48) (121/75 - 148/99)  RR: 18 (02-08-23 @ 12:48) (18 - 20)  SpO2: 100% (02-08-23 @ 12:48) (97% - 100%)  Wt(kg): --  I&O's Summary    07 Feb 2023 07:01  -  08 Feb 2023 07:00  --------------------------------------------------------  IN: 0 mL / OUT: 900 mL / NET: -900 mL        LABS:                        13.3   7.77  )-----------( 250      ( 08 Feb 2023 08:04 )             42.1     02-08    139  |  100  |  14  ----------------------------<  151<H>  4.3   |  33<H>  |  0.65    Ca    8.8      08 Feb 2023 08:04  Phos  2.9     02-08  Mg     2.4     02-08    TPro  6.1  /  Alb  2.8<L>  /  TBili  0.8  /  DBili  x   /  AST  17  /  ALT  35  /  AlkPhos  99  02-08        CAPILLARY BLOOD GLUCOSE      POCT Blood Glucose.: 287 mg/dL (08 Feb 2023 11:55)  POCT Blood Glucose.: 145 mg/dL (08 Feb 2023 07:42)  POCT Blood Glucose.: 200 mg/dL (07 Feb 2023 21:42)  POCT Blood Glucose.: 212 mg/dL (07 Feb 2023 16:55)        ABG - ( 07 Feb 2023 05:54 )  pH, Arterial: 7.44  pH, Blood: x     /  pCO2: 57    /  pO2: 145   / HCO3: 39    / Base Excess: 12.1  /  SaO2: 100                   MEDICATIONS  (STANDING):  albuterol/ipratropium for Nebulization 3 milliLiter(s) Nebulizer every 6 hours  anastrozole 1 milliGRAM(s) Oral daily  apixaban 5 milliGRAM(s) Oral every 12 hours  artificial  tears Solution 1 Drop(s) Both EYES two times a day  carvedilol 6.25 milliGRAM(s) Oral every 12 hours  chlorhexidine 2% Cloths 1 Application(s) Topical <User Schedule>  insulin lispro (ADMELOG) corrective regimen sliding scale   SubCutaneous three times a day with meals  insulin lispro (ADMELOG) corrective regimen sliding scale   SubCutaneous at bedtime  pantoprazole  Injectable 40 milliGRAM(s) IV Push daily  predniSONE   Tablet 40 milliGRAM(s) Oral daily  simvastatin 20 milliGRAM(s) Oral at bedtime    MEDICATIONS  (PRN):  guaiFENesin Oral Liquid (Sugar-Free) 200 milliGRAM(s) Oral every 6 hours PRN Cough      RADIOLOGY & ADDITIONAL TESTS:    Imaging Personally Reviewed:  [ ] YES  [ ] NO    REVIEW OF SYSTEMS:  CONSTITUTIONAL: No fever, weight loss, or fatigue  EYES: No eye pain, visual disturbances, or discharge  ENMT:  No difficulty hearing, tinnitus, vertigo; No sinus or throat pain  NECK: No pain or stiffness  BREASTS: No pain, masses, or nipple discharge  RESPIRATORY: No cough, wheezing, chills or hemoptysis; No shortness of breath  CARDIOVASCULAR: No chest pain, palpitations, dizziness, or leg swelling  GASTROINTESTINAL: No abdominal or epigastric pain. No nausea, vomiting, or hematemesis; No diarrhea or constipation. No melena or hematochezia.  GENITOURINARY: No dysuria, frequency, hematuria, or incontinence  NEUROLOGICAL: No headaches, memory loss, loss of strength, numbness, or tremors  SKIN: No itching, burning, rashes, or lesions   LYMPH NODES: No enlarged glands  ENDOCRINE: No heat or cold intolerance; No hair loss  MUSCULOSKELETAL: No joint pain or swelling; No muscle, back, or extremity pain  PSYCHIATRIC: No depression, anxiety, mood swings, or difficulty sleeping  HEME/LYMPH: No easy bruising, or bleeding gums  ALLERY AND IMMUNOLOGIC: No hives or eczema      Consultant(s) Notes Reviewed:  [ x ] YES  [ ] NO    PHYSICAL EXAM:  GENERAL: NAD, well-groomed, well-developed  HEAD:  Atraumatic, Normocephalic  EYES: EOMI, PERRLA, conjunctiva and sclera clear  ENMT: No tonsillar erythema, exudates, or enlargement; Moist mucous membranes, Good dentition, No lesions  NECK: Supple, No JVD, Normal thyroid  NERVOUS SYSTEM:  Alert & Oriented X3, Good concentration; Motor Strength 5/5 B/L upper and lower extremities; DTRs 2+ intact and symmetric  CHEST/LUNG: Clear to percussion bilaterally; No rales, rhonchi, wheezing, or rubs  HEART: Regular rate and rhythm; No murmurs, rubs, or gallops  ABDOMEN: Soft, Nontender, Nondistended; Bowel sounds present  EXTREMITIES:  2+ Peripheral Pulses, No clubbing, cyanosis, or edema  LYMPH: No lymphadenopathy noted  SKIN: No rashes or lesions    Care Discussed with Consultants/Other Providers [ x] YES  [ ] NO

## 2023-02-08 NOTE — PROGRESS NOTE ADULT - SUBJECTIVE AND OBJECTIVE BOX
Patient is a 89y old  Female who presents with a chief complaint of AHRF (07 Feb 2023 19:42)  Awake, alert, comfortable in bed, on nebulizer tx in NAD    INTERVAL HPI/OVERNIGHT EVENTS:      VITAL SIGNS:  T(F): 97.1 (02-08-23 @ 05:00)  HR: 68 (02-08-23 @ 05:00)  BP: 136/71 (02-08-23 @ 05:00)  RR: 20 (02-08-23 @ 05:00)  SpO2: 100% (02-08-23 @ 05:00)  Wt(kg): --  I&O's Detail    07 Feb 2023 07:01  -  08 Feb 2023 07:00  --------------------------------------------------------  IN:  Total IN: 0 mL    OUT:    Voided (mL): 900 mL  Total OUT: 900 mL    Total NET: -900 mL              REVIEW OF SYSTEMS:    CONSTITUTIONAL:  No fevers, chills, sweats    HEENT:  Eyes:  No diplopia or blurred vision. ENT:  No earache, sore throat or runny nose.    CARDIOVASCULAR:  No pressure, squeezing, tightness, or heaviness about the chest; no palpitations.    RESPIRATORY:  Per HPI    GASTROINTESTINAL:  No abdominal pain, nausea, vomiting or diarrhea.    GENITOURINARY:  No dysuria, frequency or urgency.    NEUROLOGIC:  No paresthesias, fasciculations, seizures or weakness.    PSYCHIATRIC:  No disorder of thought or mood.      PHYSICAL EXAM:    Constitutional: Well developed and nourished  Eyes:Perrla  ENMT: normal  Neck:supple  Respiratory: good air entry  Cardiovascular: S1 S2 regular  Gastrointestinal: Soft, Non tender  Extremities: No edema  Vascular:normal  Neurological:Awake, alert,Ox3  Musculoskeletal:Normal      MEDICATIONS  (STANDING):  albuterol/ipratropium for Nebulization 3 milliLiter(s) Nebulizer every 6 hours  anastrozole 1 milliGRAM(s) Oral daily  apixaban 5 milliGRAM(s) Oral every 12 hours  artificial  tears Solution 1 Drop(s) Both EYES two times a day  carvedilol 6.25 milliGRAM(s) Oral every 12 hours  chlorhexidine 2% Cloths 1 Application(s) Topical <User Schedule>  insulin lispro (ADMELOG) corrective regimen sliding scale   SubCutaneous three times a day with meals  insulin lispro (ADMELOG) corrective regimen sliding scale   SubCutaneous at bedtime  pantoprazole  Injectable 40 milliGRAM(s) IV Push daily  predniSONE   Tablet 40 milliGRAM(s) Oral daily  simvastatin 20 milliGRAM(s) Oral at bedtime    MEDICATIONS  (PRN):  guaiFENesin Oral Liquid (Sugar-Free) 200 milliGRAM(s) Oral every 6 hours PRN Cough      Allergies    losartan (Angioedema)    Intolerances    Chicken (Stomach Upset)      LABS:                        13.3   7.77  )-----------( 250      ( 08 Feb 2023 08:04 )             42.1     02-08    139  |  100  |  14  ----------------------------<  151<H>  4.3   |  33<H>  |  0.65    Ca    8.8      08 Feb 2023 08:04  Phos  2.9     02-08  Mg     2.4     02-08    TPro  6.1  /  Alb  2.8<L>  /  TBili  0.8  /  DBili  x   /  AST  17  /  ALT  35  /  AlkPhos  99  02-08        ABG - ( 07 Feb 2023 05:54 )  pH, Arterial: 7.44  pH, Blood: x     /  pCO2: 57    /  pO2: 145   / HCO3: 39    / Base Excess: 12.1  /  SaO2: 100                   CAPILLARY BLOOD GLUCOSE      POCT Blood Glucose.: 145 mg/dL (08 Feb 2023 07:42)  POCT Blood Glucose.: 200 mg/dL (07 Feb 2023 21:42)  POCT Blood Glucose.: 212 mg/dL (07 Feb 2023 16:55)  POCT Blood Glucose.: 258 mg/dL (07 Feb 2023 11:17)        RADIOLOGY & ADDITIONAL TESTS:    CXR:    Ct scan chest:    ekg;    echo:

## 2023-02-08 NOTE — PROGRESS NOTE ADULT - ASSESSMENT
89 yo F, from home, with PMHx HTN, COPD, Afib, HFpEF (7/29/22 EF 55-60%, LVH, GIDD, Breast CA, and remote h/o LLE DVT p/w SOB and hypoxia to high 70s, admitted to medicine for AHRF 2/2 Acute diastolic CHF +/- COPD exacerbation and UTI (completed treatment). ICU consulted for concerns of AMS in the setting of Acute on chronic compensated respiratory acidosis requiring new trial of Bipap.  Patient downgraded to AI for further management. Currently on 2L NC. Will need BiPAP Q HS.   2/5 Change to AVAPS setting qHS.   02/08: patient medically optimized for discharge. Plan to d/c to home with AVAPS. On 2L NC and tolerating well. Awaiting for AVAPS machine. CM following.

## 2023-02-08 NOTE — PROGRESS NOTE ADULT - SUBJECTIVE AND OBJECTIVE BOX
CHIEF COMPLAINT:Patient is a 89y old  Female who presents with a chief complaint of AHRF.Pt appears comfortable.    	  REVIEW OF SYSTEMS:  CONSTITUTIONAL: No fever, weight loss, or fatigue  EYES: No eye pain, visual disturbances, or discharge  ENT:  No difficulty hearing, tinnitus, vertigo; No sinus or throat pain  NECK: No pain or stiffness  RESPIRATORY: No cough, wheezing, chills or hemoptysis; No Shortness of Breath  CARDIOVASCULAR: No chest pain, palpitations, passing out, dizziness, or leg swelling  GASTROINTESTINAL: No abdominal or epigastric pain. No nausea, vomiting, or hematemesis; No diarrhea or constipation. No melena or hematochezia.  GENITOURINARY: No dysuria, frequency, hematuria, or incontinence  NEUROLOGICAL: No headaches, memory loss, loss of strength, numbness, or tremors  SKIN: No itching, burning, rashes, or lesions   LYMPH Nodes: No enlarged glands  ENDOCRINE: No heat or cold intolerance; No hair loss  MUSCULOSKELETAL: No joint pain or swelling; No muscle, back, or extremity pain  PSYCHIATRIC: No depression, anxiety, mood swings, or difficulty sleeping  HEME/LYMPH: No easy bruising, or bleeding gums  ALLERGY AND IMMUNOLOGIC: No hives or eczema	        PHYSICAL EXAM:  T(C): 36.2 (02-08-23 @ 05:00), Max: 37 (02-07-23 @ 17:00)  HR: 68 (02-08-23 @ 05:00) (62 - 117)  BP: 136/71 (02-08-23 @ 05:00) (136/71 - 148/99)  RR: 20 (02-08-23 @ 05:00) (18 - 20)  SpO2: 100% (02-08-23 @ 05:00) (97% - 100%)  Wt(kg): --  I&O's Summary    07 Feb 2023 07:01  -  08 Feb 2023 07:00  --------------------------------------------------------  IN: 0 mL / OUT: 900 mL / NET: -900 mL        Appearance: Normal	  HEENT:   Normal oral mucosa, PERRL, EOMI	  Lymphatic: No lymphadenopathy  Cardiovascular: Normal S1 S2, No JVD, No murmurs, No edema  Respiratory: Lungs clear to auscultation	  Psychiatry: A & O x 3, Mood & affect appropriate  Gastrointestinal:  Soft, Non-tender, + BS	  Skin: No rashes, No ecchymoses, No cyanosis	  Neurologic: Non-focal  Extremities: Normal range of motion, No clubbing, cyanosis or edema  Vascular: Peripheral pulses palpable 2+ bilaterally    MEDICATIONS  (STANDING):  albuterol/ipratropium for Nebulization 3 milliLiter(s) Nebulizer every 6 hours  anastrozole 1 milliGRAM(s) Oral daily  apixaban 5 milliGRAM(s) Oral every 12 hours  artificial  tears Solution 1 Drop(s) Both EYES two times a day  carvedilol 6.25 milliGRAM(s) Oral every 12 hours  chlorhexidine 2% Cloths 1 Application(s) Topical <User Schedule>  insulin lispro (ADMELOG) corrective regimen sliding scale   SubCutaneous three times a day with meals  insulin lispro (ADMELOG) corrective regimen sliding scale   SubCutaneous at bedtime  pantoprazole  Injectable 40 milliGRAM(s) IV Push daily  predniSONE   Tablet 40 milliGRAM(s) Oral daily  simvastatin 20 milliGRAM(s) Oral at bedtime      LABS:	 	                       13.3   7.77  )-----------( 250      ( 08 Feb 2023 08:04 )             42.1     02-08    139  |  100  |  14  ----------------------------<  151<H>  4.3   |  33<H>  |  0.65    Ca    8.8      08 Feb 2023 08:04  Phos  2.9     02-08  Mg     2.4     02-08    TPro  6.1  /  Alb  2.8<L>  /  TBili  0.8  /  DBili  x   /  AST  17  /  ALT  35  /  AlkPhos  99  02-08    proBNP: Serum Pro-Brain Natriuretic Peptide: 3916 pg/mL (01-30 @ 20:20)  Serum Pro-Brain Natriuretic Peptide: 1943 pg/mL (01-11 @ 16:58)

## 2023-02-09 LAB
ALBUMIN SERPL ELPH-MCNC: 2.6 G/DL — LOW (ref 3.5–5)
ALP SERPL-CCNC: 98 U/L — SIGNIFICANT CHANGE UP (ref 40–120)
ALT FLD-CCNC: 37 U/L DA — SIGNIFICANT CHANGE UP (ref 10–60)
ANION GAP SERPL CALC-SCNC: 3 MMOL/L — LOW (ref 5–17)
AST SERPL-CCNC: 15 U/L — SIGNIFICANT CHANGE UP (ref 10–40)
BILIRUB SERPL-MCNC: 0.8 MG/DL — SIGNIFICANT CHANGE UP (ref 0.2–1.2)
BUN SERPL-MCNC: 14 MG/DL — SIGNIFICANT CHANGE UP (ref 7–18)
CALCIUM SERPL-MCNC: 8.8 MG/DL — SIGNIFICANT CHANGE UP (ref 8.4–10.5)
CHLORIDE SERPL-SCNC: 101 MMOL/L — SIGNIFICANT CHANGE UP (ref 96–108)
CO2 SERPL-SCNC: 35 MMOL/L — HIGH (ref 22–31)
CREAT SERPL-MCNC: 0.7 MG/DL — SIGNIFICANT CHANGE UP (ref 0.5–1.3)
EGFR: 82 ML/MIN/1.73M2 — SIGNIFICANT CHANGE UP
GLUCOSE BLDC GLUCOMTR-MCNC: 149 MG/DL — HIGH (ref 70–99)
GLUCOSE BLDC GLUCOMTR-MCNC: 171 MG/DL — HIGH (ref 70–99)
GLUCOSE BLDC GLUCOMTR-MCNC: 202 MG/DL — HIGH (ref 70–99)
GLUCOSE BLDC GLUCOMTR-MCNC: 312 MG/DL — HIGH (ref 70–99)
GLUCOSE SERPL-MCNC: 146 MG/DL — HIGH (ref 70–99)
HCT VFR BLD CALC: 41.3 % — SIGNIFICANT CHANGE UP (ref 34.5–45)
HGB BLD-MCNC: 13.2 G/DL — SIGNIFICANT CHANGE UP (ref 11.5–15.5)
MAGNESIUM SERPL-MCNC: 2.4 MG/DL — SIGNIFICANT CHANGE UP (ref 1.6–2.6)
MCHC RBC-ENTMCNC: 31.2 PG — SIGNIFICANT CHANGE UP (ref 27–34)
MCHC RBC-ENTMCNC: 32 GM/DL — SIGNIFICANT CHANGE UP (ref 32–36)
MCV RBC AUTO: 97.6 FL — SIGNIFICANT CHANGE UP (ref 80–100)
NRBC # BLD: 0 /100 WBCS — SIGNIFICANT CHANGE UP (ref 0–0)
PHOSPHATE SERPL-MCNC: 2.5 MG/DL — SIGNIFICANT CHANGE UP (ref 2.5–4.5)
PLATELET # BLD AUTO: 242 K/UL — SIGNIFICANT CHANGE UP (ref 150–400)
POTASSIUM SERPL-MCNC: 4.1 MMOL/L — SIGNIFICANT CHANGE UP (ref 3.5–5.3)
POTASSIUM SERPL-SCNC: 4.1 MMOL/L — SIGNIFICANT CHANGE UP (ref 3.5–5.3)
PROT SERPL-MCNC: 5.9 G/DL — LOW (ref 6–8.3)
RBC # BLD: 4.23 M/UL — SIGNIFICANT CHANGE UP (ref 3.8–5.2)
RBC # FLD: 13.6 % — SIGNIFICANT CHANGE UP (ref 10.3–14.5)
SODIUM SERPL-SCNC: 139 MMOL/L — SIGNIFICANT CHANGE UP (ref 135–145)
WBC # BLD: 8.6 K/UL — SIGNIFICANT CHANGE UP (ref 3.8–10.5)
WBC # FLD AUTO: 8.6 K/UL — SIGNIFICANT CHANGE UP (ref 3.8–10.5)

## 2023-02-09 RX ADMIN — Medication 1 DROP(S): at 06:41

## 2023-02-09 RX ADMIN — Medication 3 MILLILITER(S): at 03:11

## 2023-02-09 RX ADMIN — APIXABAN 5 MILLIGRAM(S): 2.5 TABLET, FILM COATED ORAL at 06:42

## 2023-02-09 RX ADMIN — Medication 1 DROP(S): at 17:21

## 2023-02-09 RX ADMIN — Medication 3 MILLILITER(S): at 20:25

## 2023-02-09 RX ADMIN — Medication 3 MILLILITER(S): at 15:31

## 2023-02-09 RX ADMIN — CARVEDILOL PHOSPHATE 6.25 MILLIGRAM(S): 80 CAPSULE, EXTENDED RELEASE ORAL at 17:18

## 2023-02-09 RX ADMIN — APIXABAN 5 MILLIGRAM(S): 2.5 TABLET, FILM COATED ORAL at 17:18

## 2023-02-09 RX ADMIN — Medication 3 MILLILITER(S): at 09:40

## 2023-02-09 RX ADMIN — CARVEDILOL PHOSPHATE 6.25 MILLIGRAM(S): 80 CAPSULE, EXTENDED RELEASE ORAL at 06:41

## 2023-02-09 RX ADMIN — CHLORHEXIDINE GLUCONATE 1 APPLICATION(S): 213 SOLUTION TOPICAL at 06:41

## 2023-02-09 RX ADMIN — Medication 2: at 17:16

## 2023-02-09 RX ADMIN — PANTOPRAZOLE SODIUM 40 MILLIGRAM(S): 20 TABLET, DELAYED RELEASE ORAL at 13:13

## 2023-02-09 RX ADMIN — SIMVASTATIN 20 MILLIGRAM(S): 20 TABLET, FILM COATED ORAL at 21:57

## 2023-02-09 RX ADMIN — ANASTROZOLE 1 MILLIGRAM(S): 1 TABLET ORAL at 13:13

## 2023-02-09 RX ADMIN — Medication 4: at 11:50

## 2023-02-09 RX ADMIN — Medication 40 MILLIGRAM(S): at 06:42

## 2023-02-09 NOTE — PROGRESS NOTE ADULT - ASSESSMENT
89 yo F, from home, with PMHx HTN, COPD, Afib, HFpEF (7/29/22 EF 55-60%, LVH, GIDD, Breast CA, and remote h/o LLE DVT p/w SOB and hypoxia to high 70s, admitted to medicine for AHRF 2/2 Acute diastolic CHF +/- COPD exacerbation and UTI (completed treatment). ICU consulted for concerns of AMS in the setting of Acute on chronic compensated respiratory acidosis requiring new trial of Bipap.  Patient downgraded to AI for further management. Currently on 2L NC. Will need BiPAP Q HS.   2/5 Change to AVAPS setting qHS.   02/08: patient medically optimized for discharge. Plan to d/c to home with AVAPS. On 2L NC and tolerating well. Awaiting for AVAPS machine. CM following.   02/09: Patient optimized medically and plan to home with family. Awaiting AVAPS.    89 yo F, from home, with PMHx HTN, COPD, Afib, HFpEF (7/29/22 EF 55-60%, LVH, GIDD, Breast CA, and remote h/o LLE DVT p/w SOB and hypoxia to high 70s, admitted to medicine for AHRF 2/2 Acute diastolic CHF +/- COPD exacerbation and UTI (completed treatment). ICU consulted for concerns of AMS in the setting of Acute on chronic compensated respiratory acidosis requiring new trial of Bipap.  Patient downgraded to AI for further management. Currently on 2L NC. Will need BiPAP Q HS.   2/5 Change to AVAPS setting qHS.   02/08: patient medically optimized for discharge. Plan to d/c to home with AVAPS. On 2L NC and tolerating well. Awaiting for AVAPS machine. CM following.   02/09: Patient optimized medically and plan to home with family. Awaiting AVAPS. Eval for home O2 pending.

## 2023-02-09 NOTE — PROGRESS NOTE ADULT - SUBJECTIVE AND OBJECTIVE BOX
Patient is a 89y old  Female who presents with a chief complaint of AHRF (09 Feb 2023 08:23)  Awake, alert, clinically stable and comfortable in bed on NC in NAD    INTERVAL HPI/OVERNIGHT EVENTS:      VITAL SIGNS:  T(F): 97.1 (02-09-23 @ 05:56)  HR: 72 (02-09-23 @ 05:56)  BP: 140/77 (02-09-23 @ 05:56)  RR: 19 (02-09-23 @ 05:56)  SpO2: 99% (02-09-23 @ 05:56)  Wt(kg): --  I&O's Detail          REVIEW OF SYSTEMS:    CONSTITUTIONAL:  No fevers, chills, sweats    HEENT:  Eyes:  No diplopia or blurred vision. ENT:  No earache, sore throat or runny nose.    CARDIOVASCULAR:  No pressure, squeezing, tightness, or heaviness about the chest; no palpitations.    RESPIRATORY:  Per HPI    GASTROINTESTINAL:  No abdominal pain, nausea, vomiting or diarrhea.    GENITOURINARY:  No dysuria, frequency or urgency.    NEUROLOGIC:  No paresthesias, fasciculations, seizures or weakness.    PSYCHIATRIC:  No disorder of thought or mood.      PHYSICAL EXAM:    Constitutional: Well developed and nourished  Eyes:Perrla  ENMT: normal  Neck:supple  Respiratory: good air entry  Cardiovascular: S1 S2 regular  Gastrointestinal: Soft, Non tender  Extremities: No edema  Vascular:normal  Neurological:Awake, alert,Ox3  Musculoskeletal:Normal      MEDICATIONS  (STANDING):  albuterol/ipratropium for Nebulization 3 milliLiter(s) Nebulizer every 6 hours  anastrozole 1 milliGRAM(s) Oral daily  apixaban 5 milliGRAM(s) Oral every 12 hours  artificial  tears Solution 1 Drop(s) Both EYES two times a day  carvedilol 6.25 milliGRAM(s) Oral every 12 hours  chlorhexidine 2% Cloths 1 Application(s) Topical <User Schedule>  insulin lispro (ADMELOG) corrective regimen sliding scale   SubCutaneous three times a day with meals  insulin lispro (ADMELOG) corrective regimen sliding scale   SubCutaneous at bedtime  pantoprazole  Injectable 40 milliGRAM(s) IV Push daily  predniSONE   Tablet 40 milliGRAM(s) Oral daily  simvastatin 20 milliGRAM(s) Oral at bedtime    MEDICATIONS  (PRN):  guaiFENesin Oral Liquid (Sugar-Free) 200 milliGRAM(s) Oral every 6 hours PRN Cough      Allergies    losartan (Angioedema)    Intolerances    Chicken (Stomach Upset)      LABS:                        13.2   8.60  )-----------( 242      ( 09 Feb 2023 07:18 )             41.3     02-09    139  |  101  |  14  ----------------------------<  146<H>  4.1   |  35<H>  |  0.70    Ca    8.8      09 Feb 2023 07:18  Phos  2.5     02-09  Mg     2.4     02-09    TPro  5.9<L>  /  Alb  2.6<L>  /  TBili  0.8  /  DBili  x   /  AST  15  /  ALT  37  /  AlkPhos  98  02-09              CAPILLARY BLOOD GLUCOSE      POCT Blood Glucose.: 149 mg/dL (09 Feb 2023 07:51)  POCT Blood Glucose.: 161 mg/dL (08 Feb 2023 22:10)  POCT Blood Glucose.: 219 mg/dL (08 Feb 2023 17:00)  POCT Blood Glucose.: 287 mg/dL (08 Feb 2023 11:55)        RADIOLOGY & ADDITIONAL TESTS:    CXR:    Ct scan chest:    ekg;    echo: Patient is a 89y old  Female who presents with a chief complaint of AHRF (09 Feb 2023 08:23)  Awake, alert, clinically stable and comfortable in bed on NC oxygen in NAD    INTERVAL HPI/OVERNIGHT EVENTS:      VITAL SIGNS:  T(F): 97.1 (02-09-23 @ 05:56)  HR: 72 (02-09-23 @ 05:56)  BP: 140/77 (02-09-23 @ 05:56)  RR: 19 (02-09-23 @ 05:56)  SpO2: 99% (02-09-23 @ 05:56)  Wt(kg): --  I&O's Detail          REVIEW OF SYSTEMS:    CONSTITUTIONAL:  No fevers, chills, sweats    HEENT:  Eyes:  No diplopia or blurred vision. ENT:  No earache, sore throat or runny nose.    CARDIOVASCULAR:  No pressure, squeezing, tightness, or heaviness about the chest; no palpitations.    RESPIRATORY:  Per HPI    GASTROINTESTINAL:  No abdominal pain, nausea, vomiting or diarrhea.    GENITOURINARY:  No dysuria, frequency or urgency.    NEUROLOGIC:  No paresthesias, fasciculations, seizures or weakness.    PSYCHIATRIC:  No disorder of thought or mood.      PHYSICAL EXAM:    Constitutional: Well developed and nourished  Eyes:Perrla  ENMT: normal  Neck:supple  Respiratory: good air entry  Cardiovascular: S1 S2 regular  Gastrointestinal: Soft, Non tender  Extremities: No edema  Vascular:normal  Neurological:Awake, alert,Ox3  Musculoskeletal:Normal      MEDICATIONS  (STANDING):  albuterol/ipratropium for Nebulization 3 milliLiter(s) Nebulizer every 6 hours  anastrozole 1 milliGRAM(s) Oral daily  apixaban 5 milliGRAM(s) Oral every 12 hours  artificial  tears Solution 1 Drop(s) Both EYES two times a day  carvedilol 6.25 milliGRAM(s) Oral every 12 hours  chlorhexidine 2% Cloths 1 Application(s) Topical <User Schedule>  insulin lispro (ADMELOG) corrective regimen sliding scale   SubCutaneous three times a day with meals  insulin lispro (ADMELOG) corrective regimen sliding scale   SubCutaneous at bedtime  pantoprazole  Injectable 40 milliGRAM(s) IV Push daily  predniSONE   Tablet 40 milliGRAM(s) Oral daily  simvastatin 20 milliGRAM(s) Oral at bedtime    MEDICATIONS  (PRN):  guaiFENesin Oral Liquid (Sugar-Free) 200 milliGRAM(s) Oral every 6 hours PRN Cough      Allergies    losartan (Angioedema)    Intolerances    Chicken (Stomach Upset)      LABS:                        13.2   8.60  )-----------( 242      ( 09 Feb 2023 07:18 )             41.3     02-09    139  |  101  |  14  ----------------------------<  146<H>  4.1   |  35<H>  |  0.70    Ca    8.8      09 Feb 2023 07:18  Phos  2.5     02-09  Mg     2.4     02-09    TPro  5.9<L>  /  Alb  2.6<L>  /  TBili  0.8  /  DBili  x   /  AST  15  /  ALT  37  /  AlkPhos  98  02-09              CAPILLARY BLOOD GLUCOSE      POCT Blood Glucose.: 149 mg/dL (09 Feb 2023 07:51)  POCT Blood Glucose.: 161 mg/dL (08 Feb 2023 22:10)  POCT Blood Glucose.: 219 mg/dL (08 Feb 2023 17:00)  POCT Blood Glucose.: 287 mg/dL (08 Feb 2023 11:55)        RADIOLOGY & ADDITIONAL TESTS:    CXR:    Ct scan chest:    ekg;    echo:

## 2023-02-09 NOTE — PROGRESS NOTE ADULT - SUBJECTIVE AND OBJECTIVE BOX
Patient was seen and examined  Patient is a 89y old  Female who presents with a chief complaint of AHRF (09 Feb 2023 11:48)      INTERVAL HPI/OVERNIGHT EVENTS:  T(C): 36.2 (02-09-23 @ 05:56), Max: 36.7 (02-08-23 @ 12:48)  HR: 72 (02-09-23 @ 05:56) (72 - 75)  BP: 140/77 (02-09-23 @ 05:56) (121/75 - 140/77)  RR: 19 (02-09-23 @ 05:56) (18 - 19)  SpO2: 99% (02-09-23 @ 05:56) (99% - 100%)  Wt(kg): --  I&O's Summary      LABS:                        13.2   8.60  )-----------( 242      ( 09 Feb 2023 07:18 )             41.3     02-09    139  |  101  |  14  ----------------------------<  146<H>  4.1   |  35<H>  |  0.70    Ca    8.8      09 Feb 2023 07:18  Phos  2.5     02-09  Mg     2.4     02-09    TPro  5.9<L>  /  Alb  2.6<L>  /  TBili  0.8  /  DBili  x   /  AST  15  /  ALT  37  /  AlkPhos  98  02-09        CAPILLARY BLOOD GLUCOSE      POCT Blood Glucose.: 312 mg/dL (09 Feb 2023 11:15)  POCT Blood Glucose.: 149 mg/dL (09 Feb 2023 07:51)  POCT Blood Glucose.: 161 mg/dL (08 Feb 2023 22:10)  POCT Blood Glucose.: 219 mg/dL (08 Feb 2023 17:00)              MEDICATIONS  (STANDING):  albuterol/ipratropium for Nebulization 3 milliLiter(s) Nebulizer every 6 hours  anastrozole 1 milliGRAM(s) Oral daily  apixaban 5 milliGRAM(s) Oral every 12 hours  artificial  tears Solution 1 Drop(s) Both EYES two times a day  carvedilol 6.25 milliGRAM(s) Oral every 12 hours  chlorhexidine 2% Cloths 1 Application(s) Topical <User Schedule>  insulin lispro (ADMELOG) corrective regimen sliding scale   SubCutaneous three times a day with meals  insulin lispro (ADMELOG) corrective regimen sliding scale   SubCutaneous at bedtime  pantoprazole  Injectable 40 milliGRAM(s) IV Push daily  predniSONE   Tablet 40 milliGRAM(s) Oral daily  simvastatin 20 milliGRAM(s) Oral at bedtime    MEDICATIONS  (PRN):  guaiFENesin Oral Liquid (Sugar-Free) 200 milliGRAM(s) Oral every 6 hours PRN Cough      RADIOLOGY & ADDITIONAL TESTS:    Imaging Personally Reviewed:  [ ] YES  [ ] NO    REVIEW OF SYSTEMS:  CONSTITUTIONAL: No fever, weight loss, or fatigue  EYES: No eye pain, visual disturbances, or discharge  ENMT:  No difficulty hearing, tinnitus, vertigo; No sinus or throat pain  NECK: No pain or stiffness  BREASTS: No pain, masses, or nipple discharge  RESPIRATORY: No cough, wheezing, chills or hemoptysis; No shortness of breath  CARDIOVASCULAR: No chest pain, palpitations, dizziness, or leg swelling  GASTROINTESTINAL: No abdominal or epigastric pain. No nausea, vomiting, or hematemesis; No diarrhea or constipation. No melena or hematochezia.  GENITOURINARY: No dysuria, frequency, hematuria, or incontinence  NEUROLOGICAL: No headaches, memory loss, loss of strength, numbness, or tremors  SKIN: No itching, burning, rashes, or lesions   LYMPH NODES: No enlarged glands  ENDOCRINE: No heat or cold intolerance; No hair loss  MUSCULOSKELETAL: No joint pain or swelling; No muscle, back, or extremity pain  PSYCHIATRIC: No depression, anxiety, mood swings, or difficulty sleeping  HEME/LYMPH: No easy bruising, or bleeding gums  ALLERY AND IMMUNOLOGIC: No hives or eczema      Consultant(s) Notes Reviewed:  [ x ] YES  [ ] NO    PHYSICAL EXAM:  GENERAL: NAD, well-groomed, well-developed  HEAD:  Atraumatic, Normocephalic  EYES: blind   ENMT: No tonsillar erythema, exudates, or enlargement; Moist mucous membranes, Good dentition, No lesions  NECK: Supple, No JVD, Normal thyroid  NERVOUS SYSTEM:  Alert & Oriented X3, Good concentration; Motor Strength 5/5 B/L upper and lower extremities; DTRs 2+ intact and symmetric  CHEST/LUNG: Clear to percussion bilaterally; No rales, rhonchi, wheezing, or rubs  HEART: Regular rate and rhythm; No murmurs, rubs, or gallops  ABDOMEN: Soft, Nontender, Nondistended; Bowel sounds present  EXTREMITIES:  2+ Peripheral Pulses, No clubbing, cyanosis, or edema  LYMPH: No lymphadenopathy noted  SKIN: No rashes or lesions    Care Discussed with Consultants/Other Providers [ x] YES  [ ] NO

## 2023-02-09 NOTE — PROGRESS NOTE ADULT - SUBJECTIVE AND OBJECTIVE BOX
Patient is seen and examined at the bed side, is afebrile.  She has no new complaints. The family at the bed side.       REVIEW OF SYSTEMS: All other review systems are negative      ALLERGIES: Chicken (Stomach Upset)  losartan (Angioedema)      Vital Signs Last 24 Hrs  T(C): 36.2 (2023 05:56), Max: 36.7 (2023 12:48)  T(F): 97.1 (2023 05:56), Max: 98 (2023 12:48)  HR: 72 (2023 05:56) (72 - 75)  BP: 140/77 (2023 05:56) (121/75 - 140/77)  BP(mean): --  RR: 19 (2023 05:56) (18 - 19)  SpO2: 99% (2023 05:56) (99% - 100%)    Parameters below as of 2023 05:56  Patient On (Oxygen Delivery Method): nasal cannula  O2 Flow (L/min): 2        PHYSICAL EXAM:  GENERAL: Not in distress, on oxygen via  NC  CHEST/LUNG:  Not using accessory muscles   HEART: s1 and s2 present  ABDOMEN:  Nontender and  Nondistended  EXTREMITIES: B/L  pedal  edema  CNS: awake and alert      LABS:                         13.2   8.60  )-----------( 242      ( 2023 07:18 )             41.3                            13.3   7.77  )-----------( 250      ( 2023 08:04 )             42.1         -    139  |  100  |  14  ----------------------------<  151<H>  4.3   |  33<H>  |  0.65    Ca    8.8      2023 08:04  Phos  2.9     02-08  Mg     2.4     -08    TPro  6.1  /  Alb  2.8<L>  /  TBili  0.8  /  DBili  x   /  AST  17  /  ALT  35  /  AlkPhos  99  02-08    02-08    139  |  100  |  14  ----------------------------<  151<H>  4.3   |  33<H>  |  0.65    Ca    8.8      2023 08:04  Phos  2.9     02-08  Mg     2.4     02-08    TPro  6.1  /  Alb  2.8<L>  /  TBili  0.8  /  DBili  x   /  AST  17  /  ALT  35  /  AlkPhos  99  02-08    PT/INR - ( 2023 18:00 )   PT: 23.8 sec;   INR: 1.99 ratio      PTT - ( 2023 18:00 )  PTT:34.5 sec      CAPILLARY BLOOD GLUCOSE  POCT Blood Glucose.: 250 mg/dL (2023 01:11)  POCT Blood Glucose.: 176 mg/dL (2023 23:47)  POCT Blood Glucose.: 176 mg/dL (2023 16:31)        Urinalysis Basic - ( 2023 21:30 )  Color: Yellow / Appearance: very cloudy / S.010 / pH: x  Gluc: x / Ketone: Negative  / Bili: Negative / Urobili: Negative   Blood: x / Protein: 30 mg/dL / Nitrite: Negative   Leuk Esterase: Moderate / RBC: 25-50 /HPF / WBC >50 /HPF   Sq Epi: x / Non Sq Epi: Few /HPF / Bacteria: Moderate /HPF        MEDICATIONS  (STANDING):      albuterol/ipratropium for Nebulization 3 milliLiter(s) Nebulizer every 6 hours  anastrozole 1 milliGRAM(s) Oral daily  apixaban 5 milliGRAM(s) Oral every 12 hours  artificial  tears Solution 1 Drop(s) Both EYES two times a day  carvedilol 6.25 milliGRAM(s) Oral every 12 hours  chlorhexidine 2% Cloths 1 Application(s) Topical <User Schedule>  insulin lispro (ADMELOG) corrective regimen sliding scale   SubCutaneous three times a day with meals  insulin lispro (ADMELOG) corrective regimen sliding scale   SubCutaneous at bedtime  pantoprazole  Injectable 40 milliGRAM(s) IV Push daily  predniSONE   Tablet 40 milliGRAM(s) Oral daily  simvastatin 20 milliGRAM(s) Oral at bedtime          RADIOLOGY & ADDITIONAL TESTS:    23: Xray Chest 1 View- PORTABLE-Urgent (23 @ 22:00) There are increased interstitial markings which may represent the   patient's underlying COPD or pulmonary venous congestion. No focal  consolidation or gross effusion.        MICROBIOLOGY DATA:    Culture - Urine (23 @ 21:30)   Specimen Source: Clean Catch Clean Catch (Midstream)   Culture Results:   >100,000 CFU/ml Klebsiella aerogenes (Previously Enterobacter)     Culture - Blood (23 @ 18:00)   Specimen Source: .Blood Blood-Peripheral   Culture Results: No growth to date.     Culture - Blood (23 @ 17:50)   Specimen Source: .Blood Blood-Peripheral   Culture Results: No growth to date.     Flu With COVID-19 By PATRICK (23 @ 18:00)   SARS-CoV-2 Result: NotDetec                  Assessment and Plan:   · Assessment	  Patient is a 89y old  Female from home, ambulates with RW, with PMHx HTN, COPD, Afib, HFpEF (22 EF 55-60%, LVH, GIDD, mild pulmonary HTN), Breast CA, and remote h/o LLE DVT, now presents to the ER for evaluation of cough and shortness of breath. She has had dry cough for over a week which became productive and associated with wheezing over the last few days. Pts O2 saturation has been fluctuating in the 80s over the past week and this morning was in the high 70s, per granddaughters.  Pt was taken to PCP Dr. Bianchi's office who sent her to the ER for admission.. On admission, she has no fever, no Leukocytosis, but found to have positive Urine analysis and negative CXR. She has started on Ceftriaxone and The ID consult requested to assist with further evaluation and antibiotic management.    # Metabolic encephalopathy - resolved  # UTI  - Urine Cx grew Klebsiella- s/p completed the course of Ceftriaxone   # Hypoxia- on supplemental oxygenation  # Metabolic encephalopathy  # Hypercapnic Respiratory failure- on BIPAP- in ICU - 2/3/23- transferred out of ICU 23    would recommend:    1. Supplemental oxygenation and bronchodilator as needed    2. Monitor OFF Abx as she completed the course  3. Management orf BIPAP as per  protocol   4. Diuresis as per Primary team   5. PT/OOB to chair     d/w  family at the bed side regarding treatment plan and prognosis,     Attending Attestation:    Spent more than 35 minutes on total encounter, more than 50 % of the visit was spent counseling and/or coordinating care by the Attending physician.     Patient is seen and examined at the bed side, is afebrile. No over night events. The WBC count and kidney function is normal.       REVIEW OF SYSTEMS: All other review systems are negative      ALLERGIES: Chicken (Stomach Upset)  losartan (Angioedema)      Vital Signs Last 24 Hrs  T(C): 36.2 (2023 05:56), Max: 36.7 (2023 12:48)  T(F): 97.1 (2023 05:56), Max: 98 (2023 12:48)  HR: 72 (2023 05:56) (72 - 75)  BP: 140/77 (2023 05:56) (121/75 - 140/77)  BP(mean): --  RR: 19 (2023 05:56) (18 - 19)  SpO2: 99% (2023 05:56) (99% - 100%)    Parameters below as of 2023 05:56  Patient On (Oxygen Delivery Method): nasal cannula  O2 Flow (L/min): 2        PHYSICAL EXAM:  GENERAL: Not in distress, on oxygen via  NC  CHEST/LUNG:  Not using accessory muscles   HEART: s1 and s2 present  ABDOMEN:  Nontender and  Nondistended  EXTREMITIES: B/L  pedal  edema  CNS: awake and alert      LABS:                         13.2   8.60  )-----------( 242      ( 2023 07:18 )             41.3                            13.3   7.77  )-----------( 250      ( 2023 08:04 )             42.1         -    139  |  100  |  14  ----------------------------<  151<H>  4.3   |  33<H>  |  0.65    Ca    8.8      2023 08:04  Phos  2.9     02-08  Mg     2.4     -08    TPro  6.1  /  Alb  2.8<L>  /  TBili  0.8  /  DBili  x   /  AST  17  /  ALT  35  /  AlkPhos  99  02-08    02-08    139  |  100  |  14  ----------------------------<  151<H>  4.3   |  33<H>  |  0.65    Ca    8.8      2023 08:04  Phos  2.9     02-08  Mg     2.4     02-08    TPro  6.1  /  Alb  2.8<L>  /  TBili  0.8  /  DBili  x   /  AST  17  /  ALT  35  /  AlkPhos  99  02-08    PT/INR - ( 2023 18:00 )   PT: 23.8 sec;   INR: 1.99 ratio      PTT - ( 2023 18:00 )  PTT:34.5 sec      CAPILLARY BLOOD GLUCOSE  POCT Blood Glucose.: 250 mg/dL (2023 01:11)  POCT Blood Glucose.: 176 mg/dL (2023 23:47)  POCT Blood Glucose.: 176 mg/dL (2023 16:31)        Urinalysis Basic - ( 2023 21:30 )  Color: Yellow / Appearance: very cloudy / S.010 / pH: x  Gluc: x / Ketone: Negative  / Bili: Negative / Urobili: Negative   Blood: x / Protein: 30 mg/dL / Nitrite: Negative   Leuk Esterase: Moderate / RBC: 25-50 /HPF / WBC >50 /HPF   Sq Epi: x / Non Sq Epi: Few /HPF / Bacteria: Moderate /HPF        MEDICATIONS  (STANDING):      albuterol/ipratropium for Nebulization 3 milliLiter(s) Nebulizer every 6 hours  anastrozole 1 milliGRAM(s) Oral daily  apixaban 5 milliGRAM(s) Oral every 12 hours  artificial  tears Solution 1 Drop(s) Both EYES two times a day  carvedilol 6.25 milliGRAM(s) Oral every 12 hours  chlorhexidine 2% Cloths 1 Application(s) Topical <User Schedule>  insulin lispro (ADMELOG) corrective regimen sliding scale   SubCutaneous three times a day with meals  insulin lispro (ADMELOG) corrective regimen sliding scale   SubCutaneous at bedtime  pantoprazole  Injectable 40 milliGRAM(s) IV Push daily  predniSONE   Tablet 40 milliGRAM(s) Oral daily  simvastatin 20 milliGRAM(s) Oral at bedtime          RADIOLOGY & ADDITIONAL TESTS:    23: Xray Chest 1 View- PORTABLE-Urgent (23 @ 22:00) There are increased interstitial markings which may represent the   patient's underlying COPD or pulmonary venous congestion. No focal  consolidation or gross effusion.        MICROBIOLOGY DATA:    Culture - Urine (23 @ 21:30)   Specimen Source: Clean Catch Clean Catch (Midstream)   Culture Results:   >100,000 CFU/ml Klebsiella aerogenes (Previously Enterobacter)     Culture - Blood (23 @ 18:00)   Specimen Source: .Blood Blood-Peripheral   Culture Results: No growth to date.     Culture - Blood (23 @ 17:50)   Specimen Source: .Blood Blood-Peripheral   Culture Results: No growth to date.     Flu With COVID-19 By PATRICK (23 @ 18:00)   SARS-CoV-2 Result: NotDetec

## 2023-02-09 NOTE — PROGRESS NOTE ADULT - SUBJECTIVE AND OBJECTIVE BOX
CHIEF COMPLAINT:Patient is a 89y old  Female who presents with a chief complaint of AHRF.Pt appears comfortable.    	  REVIEW OF SYSTEMS:  CONSTITUTIONAL: No fever, weight loss, or fatigue  EYES: No eye pain, visual disturbances, or discharge  ENT:  No difficulty hearing, tinnitus, vertigo; No sinus or throat pain  NECK: No pain or stiffness  RESPIRATORY: No cough, wheezing, chills or hemoptysis; No Shortness of Breath  CARDIOVASCULAR: No chest pain, palpitations, passing out, dizziness, or leg swelling  GASTROINTESTINAL: No abdominal or epigastric pain. No nausea, vomiting, or hematemesis; No diarrhea or constipation. No melena or hematochezia.  GENITOURINARY: No dysuria, frequency, hematuria, or incontinence  NEUROLOGICAL: No headaches, memory loss, loss of strength, numbness, or tremors  SKIN: No itching, burning, rashes, or lesions   LYMPH Nodes: No enlarged glands  ENDOCRINE: No heat or cold intolerance; No hair loss  MUSCULOSKELETAL: No joint pain or swelling; No muscle, back, or extremity pain  PSYCHIATRIC: No depression, anxiety, mood swings, or difficulty sleeping  HEME/LYMPH: No easy bruising, or bleeding gums  ALLERGY AND IMMUNOLOGIC: No hives or eczema	      PHYSICAL EXAM:  T(C): 36.2 (02-09-23 @ 05:56), Max: 36.7 (02-08-23 @ 12:48)  HR: 72 (02-09-23 @ 05:56) (72 - 75)  BP: 140/77 (02-09-23 @ 05:56) (121/75 - 140/77)  RR: 19 (02-09-23 @ 05:56) (18 - 19)  SpO2: 99% (02-09-23 @ 05:56) (99% - 100%)        Appearance: Normal	  HEENT:   Normal oral mucosa, PERRL, EOMI	  Lymphatic: No lymphadenopathy  Cardiovascular: Normal S1 S2, No JVD, No murmurs, No edema  Respiratory: Lungs clear to auscultation	  Psychiatry: A & O x 3, Mood & affect appropriate  Gastrointestinal:  Soft, Non-tender, + BS	  Skin: No rashes, No ecchymoses, No cyanosis	  Neurologic: Non-focal  Extremities: Normal range of motion, No clubbing, cyanosis or edema  Vascular: Peripheral pulses palpable 2+ bilaterally    MEDICATIONS  (STANDING):  albuterol/ipratropium for Nebulization 3 milliLiter(s) Nebulizer every 6 hours  anastrozole 1 milliGRAM(s) Oral daily  apixaban 5 milliGRAM(s) Oral every 12 hours  artificial  tears Solution 1 Drop(s) Both EYES two times a day  carvedilol 6.25 milliGRAM(s) Oral every 12 hours  chlorhexidine 2% Cloths 1 Application(s) Topical <User Schedule>  insulin lispro (ADMELOG) corrective regimen sliding scale   SubCutaneous three times a day with meals  insulin lispro (ADMELOG) corrective regimen sliding scale   SubCutaneous at bedtime  pantoprazole  Injectable 40 milliGRAM(s) IV Push daily  predniSONE   Tablet 40 milliGRAM(s) Oral daily  simvastatin 20 milliGRAM(s) Oral at bedtime        LABS:	 	                         13.2   8.60  )-----------( 242      ( 09 Feb 2023 07:18 )             41.3     02-09    139  |  101  |  14  ----------------------------<  146<H>  4.1   |  35<H>  |  0.70    Ca    8.8      09 Feb 2023 07:18  Phos  2.5     02-09  Mg     2.4     02-09    TPro  5.9<L>  /  Alb  2.6<L>  /  TBili  0.8  /  DBili  x   /  AST  15  /  ALT  37  /  AlkPhos  98  02-09    proBNP: Serum Pro-Brain Natriuretic Peptide: 3916 pg/mL (01-30 @ 20:20)  Serum Pro-Brain Natriuretic Peptide: 1943 pg/mL (01-11 @ 16:58)

## 2023-02-09 NOTE — PROGRESS NOTE ADULT - ASSESSMENT
Patient is a 89y old  Female from home, ambulates with RW, with PMHx HTN, COPD, Afib, HFpEF (7/29/22 EF 55-60%, LVH, GIDD, mild pulmonary HTN), Breast CA, and remote h/o LLE DVT, now presents to the ER for evaluation of cough and shortness of breath. She has had dry cough for over a week which became productive and associated with wheezing over the last few days. Pts O2 saturation has been fluctuating in the 80s over the past week and this morning was in the high 70s, per granddaughters.  Pt was taken to PCP Dr. Bianchi's office who sent her to the ER for admission.. On admission, she has no fever, no Leukocytosis, but found to have positive Urine analysis and negative CXR. She has started on Ceftriaxone and The ID consult requested to assist with further evaluation and antibiotic management.    # Metabolic encephalopathy - resolved  # UTI  - Urine Cx grew Klebsiella- s/p completed the course of Ceftriaxone   # Hypoxia- on supplemental oxygenation  # Metabolic encephalopathy  # Hypercapnic Respiratory failure- on BIPAP- in ICU - 2/3/23- transferred out of ICU 2/4/23    would recommend:    1. PT/OOB to chair   2. Supplemental oxygenation and bronchodilator as needed    3. Monitor OFF Abx as she completed the course  4. Diuresis as per Primary team       Attending Attestation:    Spent more than 35 minutes on total encounter, more than 50 % of the visit was spent counseling and/or coordinating care by the Attending physician.

## 2023-02-09 NOTE — PROGRESS NOTE ADULT - PROBLEM SELECTOR PLAN 3
Exacerbation resolving.  Continue AVAPS nightly and as needed during the day. Oxygen support when not on AVAPS  Remains hypercapnic despite AVAPS usage. PCO2 on AVAPS 57-75. PCO2 on BIPAP 85. Failed BIAP and AVAPS therapy. Patient will need AVAPS-AE/Trilogy at home to manage persistent hypercapnia. Without AVAPS-AE/Trilogy patient is a high risk for readmission and intubation.   Continue prednisone and bronchodilators. Exacerbation resolving.  Continue AVAPS nightly and as needed during the day. Oxygen support when not on AVAPS  Remains hypercapnic despite AVAPS usage. PCO2 on AVAPS 57-75. PCO2 on BIPAP 85. Failed BIAP and AVAPS therapy. Patient will need AVAPS-AE/Trilogy at home to manage persistent hypercapnia. Without AVAPS-AE/Trilogy patient is a high risk for readmission and intubation.   Taper prednisone and cont. bronchodilators.

## 2023-02-09 NOTE — PROGRESS NOTE ADULT - ASSESSMENT
AS PER ICU TEAM     89 yo F, from home, with PMHx HTN, COPD, Afib, HFpEF (7/29/22 EF 55-60%, LVH, GIDD, Breast CA, and remote h/o LLE DVT p/w SOB and hypoxia to high 70s, admitted to medicine for AHRF 2/2 Acute diastolic CHF +/- COPD exacerbation and UTI (completed treatment). ICU consulted for concerns of AMS in the setting of Acute on chronic compensated respiratory acidosis requiring new trial of Bipap.  Patient downgraded to AI for further management. Currently on 2L NC. Will need BiPAP Q HS.   2/5 Change to AVAPS setting qHS.   02/08: patient medically optimized for discharge. Plan to d/c to home with AVAPS. On 2L NC and tolerating well. Awaiting for AVAPS machine. CM following.   02/09: Patient optimized medically and plan to home with family. Awaiting AVAPS.

## 2023-02-09 NOTE — PROGRESS NOTE ADULT - SUBJECTIVE AND OBJECTIVE BOX
NUNO LAST    SCU progress note    INTERVAL HPI/OVERNIGHT EVENTS: No acute events overnight.     Full Code.     Covid - 19 PCR: negative 2/3/2023    The 4Ms    What Matters Most: see GO  Age appropriate Medications/Screen for High Risk Medication: Yes  Mentation: see CAM below  Mobility: defer to physical exam    The Confusion Assessment Method (CAM) Diagnostic Algorithm     1: Acute Onset or Fluctuating Course  - Is there evidence of an acute change in mental status from the patient’s baseline? Did the (abnormal) behavior  fluctuate during the day, that is, tend to come and go, or increase and decrease in severity?  [ ] YES [x ] NO     2: Inattention  - Did the patient have difficulty focusing attention, being easily distractible, or having difficulty keeping track of what was being said?  [ ] YES [x ] NO     3: Disorganized thinking  -Was the patient’s thinking disorganized or incoherent, such as rambling or irrelevant conversation, unclear or illogical flow of ideas, or unpredictable switching from subject to subject?  [ ] YES [x ] NO    4: Altered Level of consciousness?  [ ] YES [x ] NO    The diagnosis of delirium by CAM requires the presence of features 1 and 2 and either 3 or 4.    PRESSORS: [ ] YES [x ] NO    Cardiovascular:  Heart Failure  Acute   Acute on Chronic  Chronic       carvedilol 6.25 milliGRAM(s) Oral every 12 hours    Pulmonary:  albuterol/ipratropium for Nebulization 3 milliLiter(s) Nebulizer every 6 hours  guaiFENesin Oral Liquid (Sugar-Free) 200 milliGRAM(s) Oral every 6 hours PRN    Hematalogic:  apixaban 5 milliGRAM(s) Oral every 12 hours    Other:  anastrozole 1 milliGRAM(s) Oral daily  artificial  tears Solution 1 Drop(s) Both EYES two times a day  chlorhexidine 2% Cloths 1 Application(s) Topical <User Schedule>  insulin lispro (ADMELOG) corrective regimen sliding scale   SubCutaneous three times a day with meals  insulin lispro (ADMELOG) corrective regimen sliding scale   SubCutaneous at bedtime  pantoprazole  Injectable 40 milliGRAM(s) IV Push daily  predniSONE   Tablet 40 milliGRAM(s) Oral daily  simvastatin 20 milliGRAM(s) Oral at bedtime    albuterol/ipratropium for Nebulization 3 milliLiter(s) Nebulizer every 6 hours  anastrozole 1 milliGRAM(s) Oral daily  apixaban 5 milliGRAM(s) Oral every 12 hours  artificial  tears Solution 1 Drop(s) Both EYES two times a day  carvedilol 6.25 milliGRAM(s) Oral every 12 hours  chlorhexidine 2% Cloths 1 Application(s) Topical <User Schedule>  guaiFENesin Oral Liquid (Sugar-Free) 200 milliGRAM(s) Oral every 6 hours PRN  insulin lispro (ADMELOG) corrective regimen sliding scale   SubCutaneous three times a day with meals  insulin lispro (ADMELOG) corrective regimen sliding scale   SubCutaneous at bedtime  pantoprazole  Injectable 40 milliGRAM(s) IV Push daily  predniSONE   Tablet 40 milliGRAM(s) Oral daily  simvastatin 20 milliGRAM(s) Oral at bedtime    Drug Dosing Weight  Height (cm): 154.9 (30 Jan 2023 16:23)  Weight (kg): 120 (03 Feb 2023 15:15)  BMI (kg/m2): 50 (03 Feb 2023 15:15)  BSA (m2): 2.13 (03 Feb 2023 15:15)    CENTRAL LINE: [ ] YES [x ] NO  LOCATION:   DATE INSERTED:  REMOVE: [ ] YES [ ] NO  EXPLAIN:    AGUIRRE: [ ] YES [ ] NO    DATE INSERTED:  REMOVE:  [ ] YES [ ] NO  EXPLAIN:    PAST MEDICAL & SURGICAL HISTORY:  Arthritis      Legally blind      Pre-diabetes      Breast cancer  right, no chemo or radiation      COPD exacerbation      Atrial fibrillation      HF (heart failure)  HFpEF 7/29      HTN (hypertension)      Deep vein thrombosis (DVT)  Left Lower Extremity      H/O CHF      No significant past surgical history    PHYSICAL EXAM:    GENERAL: NAD, well-groomed, well-developed  HEAD:  Atraumatic, Normocephalic  EYES: EOMI, PERRLA, conjunctiva and sclera clear  ENMT: No tonsillar erythema, exudates, or enlargement; Moist mucous membranes, Good dentition, No lesions  NECK: Supple, No JVD, Normal thyroid  NERVOUS SYSTEM:  Alert & Oriented X3, Good concentration; Motor Strength 5/5 B/L upper and lower extremities; DTRs 2+ intact and symmetric  CHEST/LUNG: Clear to percussion bilaterally; No rales, rhonchi, wheezing, or rubs  HEART: Regular rate and rhythm; No murmurs, rubs, or gallops  ABDOMEN: Soft, Nontender, Nondistended; Bowel sounds present  EXTREMITIES:  2+ Peripheral Pulses, No clubbing, cyanosis, or edema  LYMPH: No lymphadenopathy noted  SKIN: No rashes or lesions      LABS:  CBC Full  -  ( 09 Feb 2023 07:18 )  WBC Count : 8.60 K/uL  RBC Count : 4.23 M/uL  Hemoglobin : 13.2 g/dL  Hematocrit : 41.3 %  Platelet Count - Automated : 242 K/uL  Mean Cell Volume : 97.6 fl  Mean Cell Hemoglobin : 31.2 pg  Mean Cell Hemoglobin Concentration : 32.0 gm/dL  Auto Neutrophil # : x  Auto Lymphocyte # : x  Auto Monocyte # : x  Auto Eosinophil # : x  Auto Basophil # : x  Auto Neutrophil % : x  Auto Lymphocyte % : x  Auto Monocyte % : x  Auto Eosinophil % : x  Auto Basophil % : x    02-09    139  |  101  |  14  ----------------------------<  146<H>  4.1   |  35<H>  |  0.70    Ca    8.8      09 Feb 2023 07:18  Phos  2.5     02-09  Mg     2.4     02-09    TPro  5.9<L>  /  Alb  2.6<L>  /  TBili  0.8  /  DBili  x   /  AST  15  /  ALT  37  /  AlkPhos  98  02-09      [  ]  DVT Prophylaxis  [  ]  Nutrition, Brand, Rate         Goal Rate        Abnormal Nutritional Status -  Malnutrition   Cachexia      Morbid Obesity BMI >/=40    RADIOLOGY & ADDITIONAL STUDIES:  ***       NUNO LAST    SCU progress note    INTERVAL HPI/OVERNIGHT EVENTS: No acute events overnight.     Full Code.     Covid - 19 PCR: negative 2/3/2023    The 4Ms    What Matters Most: see GO  Age appropriate Medications/Screen for High Risk Medication: Yes  Mentation: see CAM below  Mobility: defer to physical exam    The Confusion Assessment Method (CAM) Diagnostic Algorithm     1: Acute Onset or Fluctuating Course  - Is there evidence of an acute change in mental status from the patient’s baseline? Did the (abnormal) behavior  fluctuate during the day, that is, tend to come and go, or increase and decrease in severity?  [ ] YES [x ] NO     2: Inattention  - Did the patient have difficulty focusing attention, being easily distractible, or having difficulty keeping track of what was being said?  [ ] YES [x ] NO     3: Disorganized thinking  -Was the patient’s thinking disorganized or incoherent, such as rambling or irrelevant conversation, unclear or illogical flow of ideas, or unpredictable switching from subject to subject?  [ ] YES [x ] NO    4: Altered Level of consciousness?  [ ] YES [x ] NO    The diagnosis of delirium by CAM requires the presence of features 1 and 2 and either 3 or 4.    PRESSORS: [ ] YES [x ] NO    Cardiovascular:  Heart Failure  Acute   Acute on Chronic  Chronic       carvedilol 6.25 milliGRAM(s) Oral every 12 hours    Pulmonary:  albuterol/ipratropium for Nebulization 3 milliLiter(s) Nebulizer every 6 hours  guaiFENesin Oral Liquid (Sugar-Free) 200 milliGRAM(s) Oral every 6 hours PRN    Hematalogic:  apixaban 5 milliGRAM(s) Oral every 12 hours    Other:  anastrozole 1 milliGRAM(s) Oral daily  artificial  tears Solution 1 Drop(s) Both EYES two times a day  chlorhexidine 2% Cloths 1 Application(s) Topical <User Schedule>  insulin lispro (ADMELOG) corrective regimen sliding scale   SubCutaneous three times a day with meals  insulin lispro (ADMELOG) corrective regimen sliding scale   SubCutaneous at bedtime  pantoprazole  Injectable 40 milliGRAM(s) IV Push daily  predniSONE   Tablet 40 milliGRAM(s) Oral daily  simvastatin 20 milliGRAM(s) Oral at bedtime    albuterol/ipratropium for Nebulization 3 milliLiter(s) Nebulizer every 6 hours  anastrozole 1 milliGRAM(s) Oral daily  apixaban 5 milliGRAM(s) Oral every 12 hours  artificial  tears Solution 1 Drop(s) Both EYES two times a day  carvedilol 6.25 milliGRAM(s) Oral every 12 hours  chlorhexidine 2% Cloths 1 Application(s) Topical <User Schedule>  guaiFENesin Oral Liquid (Sugar-Free) 200 milliGRAM(s) Oral every 6 hours PRN  insulin lispro (ADMELOG) corrective regimen sliding scale   SubCutaneous three times a day with meals  insulin lispro (ADMELOG) corrective regimen sliding scale   SubCutaneous at bedtime  pantoprazole  Injectable 40 milliGRAM(s) IV Push daily  predniSONE   Tablet 40 milliGRAM(s) Oral daily  simvastatin 20 milliGRAM(s) Oral at bedtime    Drug Dosing Weight  Height (cm): 154.9 (30 Jan 2023 16:23)  Weight (kg): 120 (03 Feb 2023 15:15)  BMI (kg/m2): 50 (03 Feb 2023 15:15)  BSA (m2): 2.13 (03 Feb 2023 15:15)    CENTRAL LINE: [ ] YES [x ] NO  LOCATION:   DATE INSERTED:  REMOVE: [ ] YES [ ] NO  EXPLAIN:    AGUIRRE: [ ] YES [ ] NO    DATE INSERTED:  REMOVE:  [ ] YES [ ] NO  EXPLAIN:    PAST MEDICAL & SURGICAL HISTORY:  Arthritis      Legally blind      Pre-diabetes      Breast cancer  right, no chemo or radiation      COPD exacerbation      Atrial fibrillation      HF (heart failure)  HFpEF 7/29      HTN (hypertension)      Deep vein thrombosis (DVT)  Left Lower Extremity      H/O CHF      No significant past surgical history    PHYSICAL EXAM:    GENERAL: NAD, morbidly obese.   HEAD:  Atraumatic, Normocephalic  EYES: EOMI, PERRLA, conjunctiva and sclera clear  ENMT: No tonsillar erythema, exudates, or enlargement; Moist mucous membranes, Good dentition, No lesions  NECK: Supple, No JVD  NERVOUS SYSTEM:  Alert & Oriented X2. Follows commands. Moves upper exts. Generalized weakness.   CHEST/LUNG: Diminished at the bases b/l. On 2L NC No rales, rhonchi, wheezing, or rubs.   HEART: Regular rate and rhythm; No murmurs, rubs, or gallops  ABDOMEN: Soft, Nontender, Nondistended; Bowel sounds present  EXTREMITIES:  2+ Peripheral Pulses, No clubbing, cyanosis, +2 pitting edema of ankles.   LYMPH: No lymphadenopathy noted  SKIN: No rashes or lesions       LABS:  CBC Full  -  ( 09 Feb 2023 07:18 )  WBC Count : 8.60 K/uL  RBC Count : 4.23 M/uL  Hemoglobin : 13.2 g/dL  Hematocrit : 41.3 %  Platelet Count - Automated : 242 K/uL  Mean Cell Volume : 97.6 fl  Mean Cell Hemoglobin : 31.2 pg  Mean Cell Hemoglobin Concentration : 32.0 gm/dL  Auto Neutrophil # : x  Auto Lymphocyte # : x  Auto Monocyte # : x  Auto Eosinophil # : x  Auto Basophil # : x  Auto Neutrophil % : x  Auto Lymphocyte % : x  Auto Monocyte % : x  Auto Eosinophil % : x  Auto Basophil % : x    02-09    139  |  101  |  14  ----------------------------<  146<H>  4.1   |  35<H>  |  0.70    Ca    8.8      09 Feb 2023 07:18  Phos  2.5     02-09  Mg     2.4     02-09    TPro  5.9<L>  /  Alb  2.6<L>  /  TBili  0.8  /  DBili  x   /  AST  15  /  ALT  37  /  AlkPhos  98  02-09      RADIOLOGY & ADDITIONAL STUDIES:    < from: CT Chest No Cont (01.31.23 @ 16:36) >  ACC: [id number] EXAM:  CT CHEST   ORDERED BY: FIONA PRETTY     PROCEDURE DATE:  01/31/2023          INTERPRETATION:  HISTORY: Admitting Dxs: J44.1 CHRONIC OBSTRUCTIVE   PULMONARY DISEASE W (ACUTE) EXACERBATION    EXAMINATION: CT CHEST was performed without IV contrast.    COMPARISON: 7/28/2022.    FINDINGS:    Image quality degraded due to extensive respiratory motion.    AIRWAYS, LUNGS, PLEURA: Trachea and mainstem bronchi patent. Biapical   scarring unchanged. Bibasilar atelectasis. No focal lung consolidation.   No pleural effusion.    MEDIASTINUM: Cardiomegaly. No pericardial effusion. Thoracic aorta normal   caliber.  No large mediastinal lymph nodes.    IMAGED ABDOMEN: Unremarkable.    SOFT TISSUES: Unremarkable.    BONES: Unremarkable.      IMPRESSION:.    No focal lung consolidation.    Bibasilar atelectasis.    --- End of Report ---             TOM COATES MD; Attending Radiologist  This document has been electronically signed. Jan 31 2023  4:45PM    < end of copied text >

## 2023-02-10 LAB
ALBUMIN SERPL ELPH-MCNC: 2.7 G/DL — LOW (ref 3.5–5)
ALP SERPL-CCNC: 95 U/L — SIGNIFICANT CHANGE UP (ref 40–120)
ALT FLD-CCNC: 39 U/L DA — SIGNIFICANT CHANGE UP (ref 10–60)
ANION GAP SERPL CALC-SCNC: 5 MMOL/L — SIGNIFICANT CHANGE UP (ref 5–17)
AST SERPL-CCNC: 15 U/L — SIGNIFICANT CHANGE UP (ref 10–40)
BILIRUB SERPL-MCNC: 0.9 MG/DL — SIGNIFICANT CHANGE UP (ref 0.2–1.2)
BUN SERPL-MCNC: 15 MG/DL — SIGNIFICANT CHANGE UP (ref 7–18)
CALCIUM SERPL-MCNC: 8.6 MG/DL — SIGNIFICANT CHANGE UP (ref 8.4–10.5)
CHLORIDE SERPL-SCNC: 101 MMOL/L — SIGNIFICANT CHANGE UP (ref 96–108)
CO2 SERPL-SCNC: 33 MMOL/L — HIGH (ref 22–31)
CREAT SERPL-MCNC: 0.67 MG/DL — SIGNIFICANT CHANGE UP (ref 0.5–1.3)
EGFR: 82 ML/MIN/1.73M2 — SIGNIFICANT CHANGE UP
GLUCOSE BLDC GLUCOMTR-MCNC: 121 MG/DL — HIGH (ref 70–99)
GLUCOSE BLDC GLUCOMTR-MCNC: 123 MG/DL — HIGH (ref 70–99)
GLUCOSE BLDC GLUCOMTR-MCNC: 230 MG/DL — HIGH (ref 70–99)
GLUCOSE BLDC GLUCOMTR-MCNC: 233 MG/DL — HIGH (ref 70–99)
GLUCOSE SERPL-MCNC: 141 MG/DL — HIGH (ref 70–99)
HCT VFR BLD CALC: 42.7 % — SIGNIFICANT CHANGE UP (ref 34.5–45)
HGB BLD-MCNC: 13.2 G/DL — SIGNIFICANT CHANGE UP (ref 11.5–15.5)
MCHC RBC-ENTMCNC: 30.7 PG — SIGNIFICANT CHANGE UP (ref 27–34)
MCHC RBC-ENTMCNC: 30.9 GM/DL — LOW (ref 32–36)
MCV RBC AUTO: 99.3 FL — SIGNIFICANT CHANGE UP (ref 80–100)
NRBC # BLD: 0 /100 WBCS — SIGNIFICANT CHANGE UP (ref 0–0)
PLATELET # BLD AUTO: 229 K/UL — SIGNIFICANT CHANGE UP (ref 150–400)
POTASSIUM SERPL-MCNC: 4.2 MMOL/L — SIGNIFICANT CHANGE UP (ref 3.5–5.3)
POTASSIUM SERPL-SCNC: 4.2 MMOL/L — SIGNIFICANT CHANGE UP (ref 3.5–5.3)
PROT SERPL-MCNC: 5.9 G/DL — LOW (ref 6–8.3)
RBC # BLD: 4.3 M/UL — SIGNIFICANT CHANGE UP (ref 3.8–5.2)
RBC # FLD: 13.2 % — SIGNIFICANT CHANGE UP (ref 10.3–14.5)
SODIUM SERPL-SCNC: 139 MMOL/L — SIGNIFICANT CHANGE UP (ref 135–145)
WBC # BLD: 8.54 K/UL — SIGNIFICANT CHANGE UP (ref 3.8–10.5)
WBC # FLD AUTO: 8.54 K/UL — SIGNIFICANT CHANGE UP (ref 3.8–10.5)

## 2023-02-10 RX ORDER — PANTOPRAZOLE SODIUM 20 MG/1
40 TABLET, DELAYED RELEASE ORAL
Refills: 0 | Status: DISCONTINUED | OUTPATIENT
Start: 2023-02-10 | End: 2023-02-20

## 2023-02-10 RX ADMIN — ANASTROZOLE 1 MILLIGRAM(S): 1 TABLET ORAL at 11:39

## 2023-02-10 RX ADMIN — Medication 3 MILLILITER(S): at 15:26

## 2023-02-10 RX ADMIN — SIMVASTATIN 20 MILLIGRAM(S): 20 TABLET, FILM COATED ORAL at 21:45

## 2023-02-10 RX ADMIN — Medication 2: at 12:34

## 2023-02-10 RX ADMIN — APIXABAN 5 MILLIGRAM(S): 2.5 TABLET, FILM COATED ORAL at 17:36

## 2023-02-10 RX ADMIN — CHLORHEXIDINE GLUCONATE 1 APPLICATION(S): 213 SOLUTION TOPICAL at 05:48

## 2023-02-10 RX ADMIN — APIXABAN 5 MILLIGRAM(S): 2.5 TABLET, FILM COATED ORAL at 05:48

## 2023-02-10 RX ADMIN — CARVEDILOL PHOSPHATE 6.25 MILLIGRAM(S): 80 CAPSULE, EXTENDED RELEASE ORAL at 05:48

## 2023-02-10 RX ADMIN — Medication 3 MILLILITER(S): at 20:09

## 2023-02-10 RX ADMIN — Medication 2: at 17:19

## 2023-02-10 RX ADMIN — Medication 3 MILLILITER(S): at 03:46

## 2023-02-10 RX ADMIN — Medication 20 MILLIGRAM(S): at 05:48

## 2023-02-10 RX ADMIN — Medication 3 MILLILITER(S): at 08:29

## 2023-02-10 RX ADMIN — Medication 1 DROP(S): at 17:38

## 2023-02-10 RX ADMIN — CARVEDILOL PHOSPHATE 6.25 MILLIGRAM(S): 80 CAPSULE, EXTENDED RELEASE ORAL at 17:37

## 2023-02-10 RX ADMIN — Medication 1 DROP(S): at 05:48

## 2023-02-10 NOTE — PROGRESS NOTE ADULT - PROBLEM SELECTOR PLAN 2
Continue coreg BID and daily lasix  ECHO July 2022- EF 55-60%, LVH, GIDD, mild pulm HTN  pro-BNP 3916 (prev 1943)  Daily weight   Dr Siu following

## 2023-02-10 NOTE — PROGRESS NOTE ADULT - SUBJECTIVE AND OBJECTIVE BOX
NP Note discussed with  Primary Attending    Patient is a 89y old  Female who presents with a chief complaint of AHRF (09 Feb 2023 12:07)      INTERVAL HPI/OVERNIGHT EVENTS: no new complaints    MEDICATIONS  (STANDING):  albuterol/ipratropium for Nebulization 3 milliLiter(s) Nebulizer every 6 hours  anastrozole 1 milliGRAM(s) Oral daily  apixaban 5 milliGRAM(s) Oral every 12 hours  artificial  tears Solution 1 Drop(s) Both EYES two times a day  carvedilol 6.25 milliGRAM(s) Oral every 12 hours  chlorhexidine 2% Cloths 1 Application(s) Topical <User Schedule>  insulin lispro (ADMELOG) corrective regimen sliding scale   SubCutaneous three times a day with meals  insulin lispro (ADMELOG) corrective regimen sliding scale   SubCutaneous at bedtime  pantoprazole  Injectable 40 milliGRAM(s) IV Push daily  predniSONE   Tablet   Oral   predniSONE   Tablet 20 milliGRAM(s) Oral daily  simvastatin 20 milliGRAM(s) Oral at bedtime    MEDICATIONS  (PRN):  guaiFENesin Oral Liquid (Sugar-Free) 200 milliGRAM(s) Oral every 6 hours PRN Cough      __________________________________________________  REVIEW OF SYSTEMS:    CONSTITUTIONAL: No fever,   EYES: no acute visual disturbances  NECK: No pain or stiffness  RESPIRATORY: No cough; No shortness of breath  CARDIOVASCULAR: No chest pain, no palpitations  GASTROINTESTINAL: No pain. No nausea or vomiting; No diarrhea   NEUROLOGICAL: No headache or numbness, no tremors  MUSCULOSKELETAL: No joint pain, no muscle pain  GENITOURINARY: no dysuria, no frequency, no hesitancy  PSYCHIATRY: no depression , no anxiety  ALL OTHER  ROS negative        Vital Signs Last 24 Hrs  T(C): 36.2 (10 Feb 2023 05:00), Max: 37 (09 Feb 2023 20:58)  T(F): 97.1 (10 Feb 2023 05:00), Max: 98.6 (09 Feb 2023 20:58)  HR: 95 (10 Feb 2023 10:19) (60 - 95)  BP: 107/57 (10 Feb 2023 10:19) (107/57 - 134/83)  BP(mean): --  RR: 19 (10 Feb 2023 05:00) (18 - 20)  SpO2: 94% (10 Feb 2023 10:19) (94% - 100%)    Parameters below as of 10 Feb 2023 10:19  Patient On (Oxygen Delivery Method): nasal cannula        ________________________________________________  PHYSICAL EXAM:  GENERAL: NAD  HEENT: Normocephalic;  conjunctivae and sclerae clear; moist mucous membranes;   NECK : supple  CHEST/LUNG: Clear to auscultation bilaterally with good air entry   HEART: S1 S2  regular; no murmurs, gallops or rubs  ABDOMEN: Soft, Nontender, Nondistended; Bowel sounds present  EXTREMITIES: no cyanosis; no edema; no calf tenderness  SKIN: warm and dry; no rash  NERVOUS SYSTEM:  Awake and alert; Oriented  to place, person and time ; no new deficits    _________________________________________________  LABS:                        13.2   8.54  )-----------( 229      ( 10 Feb 2023 07:30 )             42.7     02-10    139  |  101  |  15  ----------------------------<  141<H>  4.2   |  33<H>  |  0.67    Ca    8.6      10 Feb 2023 07:30  Phos  2.5     02-09  Mg     2.4     02-09    TPro  5.9<L>  /  Alb  2.7<L>  /  TBili  0.9  /  DBili  x   /  AST  15  /  ALT  39  /  AlkPhos  95  02-10        CAPILLARY BLOOD GLUCOSE      POCT Blood Glucose.: 123 mg/dL (10 Feb 2023 08:04)  POCT Blood Glucose.: 171 mg/dL (09 Feb 2023 21:14)  POCT Blood Glucose.: 202 mg/dL (09 Feb 2023 17:04)  POCT Blood Glucose.: 312 mg/dL (09 Feb 2023 11:15)        RADIOLOGY & ADDITIONAL TESTS:    Imaging Personally Reviewed:  YES/NO    Consultant(s) Notes Reviewed:   YES/ No    Care Discussed with Consultants :     Plan of care was discussed with patient and /or primary care giver; all questions and concerns were addressed and care was aligned with patient's wishes.

## 2023-02-10 NOTE — PROGRESS NOTE ADULT - PROBLEM SELECTOR PLAN 1
Remains hypercapnic on AVAPS PCO2 57. PCO2 on admission on BIPAP 84.  Failed BIPAP and AVAPS therapy.  Will need Trilogy/AVAPS-AE upon discharge. Without AVAPS-AE patient is a high high for readmission and intubation secondary to persistent hypercapnia. Second admission for respiratory distress since October 2022. Patient also has spinal deformities which is contributing to her hypercapnia.

## 2023-02-10 NOTE — PROGRESS NOTE ADULT - ASSESSMENT
NUNO LAST    SCU progress note    INTERVAL HPI/OVERNIGHT EVENTS: No acute events overnight    DNR [ ]   DNI  [  ]    Covid - 19 PCR:     The 4Ms    What Matters Most: see GOC  Age appropriate Medications/Screen for High Risk Medication: Yes  Mentation: see CAM below  Mobility: defer to physical exam    The Confusion Assessment Method (CAM) Diagnostic Algorithm     1: Acute Onset or Fluctuating Course  - Is there evidence of an acute change in mental status from the patient’s baseline? Did the (abnormal) behavior  fluctuate during the day, that is, tend to come and go, or increase and decrease in severity?  [ ] YES [x] NO     2: Inattention  - Did the patient have difficulty focusing attention, being easily distractible, or having difficulty keeping track of what was being said?  [ ] YES [x ] NO     3: Disorganized thinking  -Was the patient’s thinking disorganized or incoherent, such as rambling or irrelevant conversation, unclear or illogical flow of ideas, or unpredictable switching from subject to subject?  [ ] YES [x ] NO    4: Altered Level of consciousness?  [ ] YES [x ] NO    The diagnosis of delirium by CAM requires the presence of features 1 and 2 and either 3 or 4.    PRESSORS: [ ] YES [ x] NO    Cardiovascular:  Heart Failure  Acute   Acute on Chronic  Chronic       carvedilol 6.25 milliGRAM(s) Oral every 12 hours    Pulmonary:  albuterol/ipratropium for Nebulization 3 milliLiter(s) Nebulizer every 6 hours  guaiFENesin Oral Liquid (Sugar-Free) 200 milliGRAM(s) Oral every 6 hours PRN    Hematalogic:  apixaban 5 milliGRAM(s) Oral every 12 hours    Other:  anastrozole 1 milliGRAM(s) Oral daily  artificial  tears Solution 1 Drop(s) Both EYES two times a day  chlorhexidine 2% Cloths 1 Application(s) Topical <User Schedule>  insulin lispro (ADMELOG) corrective regimen sliding scale   SubCutaneous three times a day with meals  insulin lispro (ADMELOG) corrective regimen sliding scale   SubCutaneous at bedtime  pantoprazole  Injectable 40 milliGRAM(s) IV Push daily  predniSONE   Tablet   Oral   predniSONE   Tablet 20 milliGRAM(s) Oral daily  simvastatin 20 milliGRAM(s) Oral at bedtime    albuterol/ipratropium for Nebulization 3 milliLiter(s) Nebulizer every 6 hours  anastrozole 1 milliGRAM(s) Oral daily  apixaban 5 milliGRAM(s) Oral every 12 hours  artificial  tears Solution 1 Drop(s) Both EYES two times a day  carvedilol 6.25 milliGRAM(s) Oral every 12 hours  chlorhexidine 2% Cloths 1 Application(s) Topical <User Schedule>  guaiFENesin Oral Liquid (Sugar-Free) 200 milliGRAM(s) Oral every 6 hours PRN  insulin lispro (ADMELOG) corrective regimen sliding scale   SubCutaneous three times a day with meals  insulin lispro (ADMELOG) corrective regimen sliding scale   SubCutaneous at bedtime  pantoprazole  Injectable 40 milliGRAM(s) IV Push daily  predniSONE   Tablet   Oral   predniSONE   Tablet 20 milliGRAM(s) Oral daily  simvastatin 20 milliGRAM(s) Oral at bedtime    Drug Dosing Weight  Height (cm): 154.9 (30 Jan 2023 16:23)  Weight (kg): 120 (03 Feb 2023 15:15)  BMI (kg/m2): 50 (03 Feb 2023 15:15)  BSA (m2): 2.13 (03 Feb 2023 15:15)    CENTRAL LINE: [ ] YES [ ] NO  LOCATION:   DATE INSERTED:  REMOVE: [ ] YES [ ] NO  EXPLAIN:    AGUIRRE: [ ] YES [ ] NO    DATE INSERTED:  REMOVE:  [ ] YES [ ] NO  EXPLAIN:    PAST MEDICAL & SURGICAL HISTORY:  Arthritis      Legally blind      Pre-diabetes      Breast cancer  right, no chemo or radiation      COPD exacerbation      Atrial fibrillation      HF (heart failure)  HFpEF 7/29      HTN (hypertension)      Deep vein thrombosis (DVT)  Left Lower Extremity      H/O CHF      No significant past surgical history                  02-09 @ 07:01  -  02-10 @ 07:00  --------------------------------------------------------  IN: 0 mL / OUT: 200 mL / NET: -200 mL            PHYSICAL EXAM:    GENERAL: NAD, well-groomed, well-developed  HEAD:  Atraumatic, Normocephalic  EYES: EOMI, PERRLA, conjunctiva and sclera clear  ENMT: No tonsillar erythema, exudates, or enlargement; Moist mucous membranes, Good dentition, No lesions  NECK: Supple, No JVD, Normal thyroid  NERVOUS SYSTEM:  Alert & Oriented X2, Follows simple commands   CHEST/LUNG: Diminished at bases   HEART: Regular rate and rhythm; No murmurs, rubs, or gallops  ABDOMEN: Soft, Nontender, Nondistended; Bowel sounds present  EXTREMITIES: 2+ Peripheral Pulses, No clubbing, cyanosis, +2 pitting edema of LE's  LYMPH: No lymphadenopathy noted  SKIN: No rashes or lesions      LABS:  CBC Full  -  ( 10 Feb 2023 07:30 )  WBC Count : 8.54 K/uL  RBC Count : 4.30 M/uL  Hemoglobin : 13.2 g/dL  Hematocrit : 42.7 %  Platelet Count - Automated : 229 K/uL  Mean Cell Volume : 99.3 fl  Mean Cell Hemoglobin : 30.7 pg  Mean Cell Hemoglobin Concentration : 30.9 gm/dL  Auto Neutrophil # : x  Auto Lymphocyte # : x  Auto Monocyte # : x  Auto Eosinophil # : x  Auto Basophil # : x  Auto Neutrophil % : x  Auto Lymphocyte % : x  Auto Monocyte % : x  Auto Eosinophil % : x  Auto Basophil % : x    02-10    139  |  101  |  15  ----------------------------<  141<H>  4.2   |  33<H>  |  0.67    Ca    8.6      10 Feb 2023 07:30  Phos  2.5     02-09  Mg     2.4     02-09    TPro  5.9<L>  /  Alb  2.7<L>  /  TBili  0.9  /  DBili  x   /  AST  15  /  ALT  39  /  AlkPhos  95  02-10              [  ]  DVT Prophylaxis  [  ]  Nutrition, Brand, Rate         Goal Rate        Abnormal Nutritional Status -  Malnutrition   Cachexia      Morbid Obesity BMI >/=40    RADIOLOGY & ADDITIONAL STUDIES:  ***    Goals of Care Discussion with Family/Proxy/Other   - see note from/family meeting set up for...

## 2023-02-10 NOTE — PROGRESS NOTE ADULT - PROBLEM SELECTOR PLAN 10
Failed BIPAP and AVAPS therapy. Remained hypercapnic on both. PCO2 57-84. Will need Trilogy/AVAPS-AE at home to adequately manage persistent hypercapnia. Without Trilogy/AVAPS-AE patient will remain hypercapnic increasing the possibility of readmission and intubation.  Trilogy at home pending approval from insurance

## 2023-02-10 NOTE — PROGRESS NOTE ADULT - SUBJECTIVE AND OBJECTIVE BOX
CHIEF COMPLAINT:Patient is a 89y old  Female who presents with a chief complaint of AHRF.Pt appears comfortable.    	  REVIEW OF SYSTEMS:  CONSTITUTIONAL: No fever, weight loss, or fatigue  EYES: No eye pain, visual disturbances, or discharge  ENT:  No difficulty hearing, tinnitus, vertigo; No sinus or throat pain  NECK: No pain or stiffness  RESPIRATORY: No cough, wheezing, chills or hemoptysis; No Shortness of Breath  CARDIOVASCULAR: No chest pain, palpitations, passing out, dizziness, or leg swelling  GASTROINTESTINAL: No abdominal or epigastric pain. No nausea, vomiting, or hematemesis; No diarrhea or constipation. No melena or hematochezia.  GENITOURINARY: No dysuria, frequency, hematuria, or incontinence  NEUROLOGICAL: No headaches, memory loss, loss of strength, numbness, or tremors  SKIN: No itching, burning, rashes, or lesions   LYMPH Nodes: No enlarged glands  ENDOCRINE: No heat or cold intolerance; No hair loss  MUSCULOSKELETAL: No joint pain or swelling; No muscle, back, or extremity pain  PSYCHIATRIC: No depression, anxiety, mood swings, or difficulty sleeping  HEME/LYMPH: No easy bruising, or bleeding gums  ALLERGY AND IMMUNOLOGIC: No hives or eczema	      PHYSICAL EXAM:  T(C): 36.2 (02-10-23 @ 05:00), Max: 37 (02-09-23 @ 20:58)  HR: 95 (02-10-23 @ 10:19) (60 - 95)  BP: 107/57 (02-10-23 @ 10:19) (107/57 - 134/83)  RR: 19 (02-10-23 @ 05:00) (18 - 20)  SpO2: 94% (02-10-23 @ 10:19) (94% - 100%)  Wt(kg): --  I&O's Summary    09 Feb 2023 07:01  -  10 Feb 2023 07:00  --------------------------------------------------------  IN: 0 mL / OUT: 200 mL / NET: -200 mL        Appearance: Normal	  HEENT:   Normal oral mucosa, PERRL, EOMI	  Lymphatic: No lymphadenopathy  Cardiovascular: Normal S1 S2, No JVD, No murmurs, No edema  Respiratory: Lungs clear to auscultation	  Psychiatry: A & O x 3, Mood & affect appropriate  Gastrointestinal:  Soft, Non-tender, + BS	  Skin: No rashes, No ecchymoses, No cyanosis	  Neurologic: Non-focal  Extremities: Normal range of motion, No clubbing, cyanosis or edema  Vascular: Peripheral pulses palpable 2+ bilaterally    MEDICATIONS  (STANDING):  albuterol/ipratropium for Nebulization 3 milliLiter(s) Nebulizer every 6 hours  anastrozole 1 milliGRAM(s) Oral daily  apixaban 5 milliGRAM(s) Oral every 12 hours  artificial  tears Solution 1 Drop(s) Both EYES two times a day  carvedilol 6.25 milliGRAM(s) Oral every 12 hours  chlorhexidine 2% Cloths 1 Application(s) Topical <User Schedule>  insulin lispro (ADMELOG) corrective regimen sliding scale   SubCutaneous three times a day with meals  insulin lispro (ADMELOG) corrective regimen sliding scale   SubCutaneous at bedtime  pantoprazole    Tablet 40 milliGRAM(s) Oral before breakfast  predniSONE   Tablet   Oral   predniSONE   Tablet 20 milliGRAM(s) Oral daily  simvastatin 20 milliGRAM(s) Oral at bedtime      	  	  LABS:	 	                      13.2   8.54  )-----------( 229      ( 10 Feb 2023 07:30 )             42.7     02-10    139  |  101  |  15  ----------------------------<  141<H>  4.2   |  33<H>  |  0.67    Ca    8.6      10 Feb 2023 07:30  Phos  2.5     02-09  Mg     2.4     02-09    TPro  5.9<L>  /  Alb  2.7<L>  /  TBili  0.9  /  DBili  x   /  AST  15  /  ALT  39  /  AlkPhos  95  02-10    proBNP: Serum Pro-Brain Natriuretic Peptide: 3916 pg/mL (01-30 @ 20:20)  Serum Pro-Brain Natriuretic Peptide: 1943 pg/mL (01-11 @ 16:58)

## 2023-02-10 NOTE — PROGRESS NOTE ADULT - SUBJECTIVE AND OBJECTIVE BOX
Patient is a 89y old  Female who presents with a chief complaint of AHRF (10 Feb 2023 10:48)  Awake, alert, sitting comfortably in bed on oxygen supp via NC in NAD    INTERVAL HPI/OVERNIGHT EVENTS:      VITAL SIGNS:  T(F): 97.1 (02-10-23 @ 05:00)  HR: 95 (02-10-23 @ 10:19)  BP: 107/57 (02-10-23 @ 10:19)  RR: 19 (02-10-23 @ 05:00)  SpO2: 94% (02-10-23 @ 10:19)  Wt(kg): --  I&O's Detail    09 Feb 2023 07:01  -  10 Feb 2023 07:00  --------------------------------------------------------  IN:  Total IN: 0 mL    OUT:    Voided (mL): 200 mL  Total OUT: 200 mL    Total NET: -200 mL              REVIEW OF SYSTEMS:    CONSTITUTIONAL:  No fevers, chills, sweats    HEENT:  Eyes:  No diplopia or blurred vision. ENT:  No earache, sore throat or runny nose.    CARDIOVASCULAR:  No pressure, squeezing, tightness, or heaviness about the chest; no palpitations.    RESPIRATORY:  Per HPI    GASTROINTESTINAL:  No abdominal pain, nausea, vomiting or diarrhea.    GENITOURINARY:  No dysuria, frequency or urgency.    NEUROLOGIC:  No paresthesias, fasciculations, seizures or weakness.    PSYCHIATRIC:  No disorder of thought or mood.      PHYSICAL EXAM:    Constitutional: Well developed and nourished  Eyes:Perrla  ENMT: normal  Neck:supple  Respiratory: good air entry  Cardiovascular: S1 S2 regular  Gastrointestinal: Soft, Non tender  Extremities: No edema  Vascular:normal  Neurological:Awake, alert,Ox3  Musculoskeletal:Normal      MEDICATIONS  (STANDING):  albuterol/ipratropium for Nebulization 3 milliLiter(s) Nebulizer every 6 hours  anastrozole 1 milliGRAM(s) Oral daily  apixaban 5 milliGRAM(s) Oral every 12 hours  artificial  tears Solution 1 Drop(s) Both EYES two times a day  carvedilol 6.25 milliGRAM(s) Oral every 12 hours  chlorhexidine 2% Cloths 1 Application(s) Topical <User Schedule>  insulin lispro (ADMELOG) corrective regimen sliding scale   SubCutaneous three times a day with meals  insulin lispro (ADMELOG) corrective regimen sliding scale   SubCutaneous at bedtime  pantoprazole  Injectable 40 milliGRAM(s) IV Push daily  predniSONE   Tablet   Oral   predniSONE   Tablet 20 milliGRAM(s) Oral daily  simvastatin 20 milliGRAM(s) Oral at bedtime    MEDICATIONS  (PRN):  guaiFENesin Oral Liquid (Sugar-Free) 200 milliGRAM(s) Oral every 6 hours PRN Cough      Allergies    losartan (Angioedema)    Intolerances    Chicken (Stomach Upset)      LABS:                        13.2   8.54  )-----------( 229      ( 10 Feb 2023 07:30 )             42.7     02-10    139  |  101  |  15  ----------------------------<  141<H>  4.2   |  33<H>  |  0.67    Ca    8.6      10 Feb 2023 07:30  Phos  2.5     02-09  Mg     2.4     02-09    TPro  5.9<L>  /  Alb  2.7<L>  /  TBili  0.9  /  DBili  x   /  AST  15  /  ALT  39  /  AlkPhos  95  02-10              CAPILLARY BLOOD GLUCOSE      POCT Blood Glucose.: 123 mg/dL (10 Feb 2023 08:04)  POCT Blood Glucose.: 171 mg/dL (09 Feb 2023 21:14)  POCT Blood Glucose.: 202 mg/dL (09 Feb 2023 17:04)  POCT Blood Glucose.: 312 mg/dL (09 Feb 2023 11:15)        RADIOLOGY & ADDITIONAL TESTS:    CXR:    Ct scan chest:    ekg;    echo: Patient is a 89y old  Female who presents with a chief complaint of AHRF (10 Feb 2023 10:48)  Awake, alert, sitting comfortably in bed on oxygen supp via NC in NAD. Clinically improved    INTERVAL HPI/OVERNIGHT EVENTS:      VITAL SIGNS:  T(F): 97.1 (02-10-23 @ 05:00)  HR: 95 (02-10-23 @ 10:19)  BP: 107/57 (02-10-23 @ 10:19)  RR: 19 (02-10-23 @ 05:00)  SpO2: 94% (02-10-23 @ 10:19)  Wt(kg): --  I&O's Detail    09 Feb 2023 07:01  -  10 Feb 2023 07:00  --------------------------------------------------------  IN:  Total IN: 0 mL    OUT:    Voided (mL): 200 mL  Total OUT: 200 mL    Total NET: -200 mL              REVIEW OF SYSTEMS:    CONSTITUTIONAL:  No fevers, chills, sweats    HEENT:  Eyes:  No diplopia or blurred vision. ENT:  No earache, sore throat or runny nose.    CARDIOVASCULAR:  No pressure, squeezing, tightness, or heaviness about the chest; no palpitations.    RESPIRATORY:  Per HPI    GASTROINTESTINAL:  No abdominal pain, nausea, vomiting or diarrhea.    GENITOURINARY:  No dysuria, frequency or urgency.    NEUROLOGIC:  No paresthesias, fasciculations, seizures or weakness.    PSYCHIATRIC:  No disorder of thought or mood.      PHYSICAL EXAM:    Constitutional: Well developed and nourished  Eyes:Perrla  ENMT: normal  Neck:supple  Respiratory: good air entry  Cardiovascular: S1 S2 regular  Gastrointestinal: Soft, Non tender  Extremities: No edema  Vascular:normal  Neurological:Awake, alert,Ox3  Musculoskeletal:Normal      MEDICATIONS  (STANDING):  albuterol/ipratropium for Nebulization 3 milliLiter(s) Nebulizer every 6 hours  anastrozole 1 milliGRAM(s) Oral daily  apixaban 5 milliGRAM(s) Oral every 12 hours  artificial  tears Solution 1 Drop(s) Both EYES two times a day  carvedilol 6.25 milliGRAM(s) Oral every 12 hours  chlorhexidine 2% Cloths 1 Application(s) Topical <User Schedule>  insulin lispro (ADMELOG) corrective regimen sliding scale   SubCutaneous three times a day with meals  insulin lispro (ADMELOG) corrective regimen sliding scale   SubCutaneous at bedtime  pantoprazole  Injectable 40 milliGRAM(s) IV Push daily  predniSONE   Tablet   Oral   predniSONE   Tablet 20 milliGRAM(s) Oral daily  simvastatin 20 milliGRAM(s) Oral at bedtime    MEDICATIONS  (PRN):  guaiFENesin Oral Liquid (Sugar-Free) 200 milliGRAM(s) Oral every 6 hours PRN Cough      Allergies    losartan (Angioedema)    Intolerances    Chicken (Stomach Upset)      LABS:                        13.2   8.54  )-----------( 229      ( 10 Feb 2023 07:30 )             42.7     02-10    139  |  101  |  15  ----------------------------<  141<H>  4.2   |  33<H>  |  0.67    Ca    8.6      10 Feb 2023 07:30  Phos  2.5     02-09  Mg     2.4     02-09    TPro  5.9<L>  /  Alb  2.7<L>  /  TBili  0.9  /  DBili  x   /  AST  15  /  ALT  39  /  AlkPhos  95  02-10              CAPILLARY BLOOD GLUCOSE      POCT Blood Glucose.: 123 mg/dL (10 Feb 2023 08:04)  POCT Blood Glucose.: 171 mg/dL (09 Feb 2023 21:14)  POCT Blood Glucose.: 202 mg/dL (09 Feb 2023 17:04)  POCT Blood Glucose.: 312 mg/dL (09 Feb 2023 11:15)        RADIOLOGY & ADDITIONAL TESTS:    CXR:    Ct scan chest:    ekg;    echo:

## 2023-02-11 LAB
ANION GAP SERPL CALC-SCNC: 5 MMOL/L — SIGNIFICANT CHANGE UP (ref 5–17)
BUN SERPL-MCNC: 13 MG/DL — SIGNIFICANT CHANGE UP (ref 7–18)
CALCIUM SERPL-MCNC: 8.8 MG/DL — SIGNIFICANT CHANGE UP (ref 8.4–10.5)
CHLORIDE SERPL-SCNC: 102 MMOL/L — SIGNIFICANT CHANGE UP (ref 96–108)
CO2 SERPL-SCNC: 30 MMOL/L — SIGNIFICANT CHANGE UP (ref 22–31)
CREAT SERPL-MCNC: 0.66 MG/DL — SIGNIFICANT CHANGE UP (ref 0.5–1.3)
EGFR: 83 ML/MIN/1.73M2 — SIGNIFICANT CHANGE UP
GLUCOSE BLDC GLUCOMTR-MCNC: 115 MG/DL — HIGH (ref 70–99)
GLUCOSE BLDC GLUCOMTR-MCNC: 197 MG/DL — HIGH (ref 70–99)
GLUCOSE BLDC GLUCOMTR-MCNC: 200 MG/DL — HIGH (ref 70–99)
GLUCOSE BLDC GLUCOMTR-MCNC: 268 MG/DL — HIGH (ref 70–99)
GLUCOSE SERPL-MCNC: 123 MG/DL — HIGH (ref 70–99)
HCT VFR BLD CALC: 41 % — SIGNIFICANT CHANGE UP (ref 34.5–45)
HGB BLD-MCNC: 12.9 G/DL — SIGNIFICANT CHANGE UP (ref 11.5–15.5)
MCHC RBC-ENTMCNC: 30.9 PG — SIGNIFICANT CHANGE UP (ref 27–34)
MCHC RBC-ENTMCNC: 31.5 GM/DL — LOW (ref 32–36)
MCV RBC AUTO: 98.3 FL — SIGNIFICANT CHANGE UP (ref 80–100)
NRBC # BLD: 0 /100 WBCS — SIGNIFICANT CHANGE UP (ref 0–0)
PLATELET # BLD AUTO: 220 K/UL — SIGNIFICANT CHANGE UP (ref 150–400)
POTASSIUM SERPL-MCNC: 4.4 MMOL/L — SIGNIFICANT CHANGE UP (ref 3.5–5.3)
POTASSIUM SERPL-SCNC: 4.4 MMOL/L — SIGNIFICANT CHANGE UP (ref 3.5–5.3)
RBC # BLD: 4.17 M/UL — SIGNIFICANT CHANGE UP (ref 3.8–5.2)
RBC # FLD: 13.2 % — SIGNIFICANT CHANGE UP (ref 10.3–14.5)
SODIUM SERPL-SCNC: 137 MMOL/L — SIGNIFICANT CHANGE UP (ref 135–145)
WBC # BLD: 7.38 K/UL — SIGNIFICANT CHANGE UP (ref 3.8–10.5)
WBC # FLD AUTO: 7.38 K/UL — SIGNIFICANT CHANGE UP (ref 3.8–10.5)

## 2023-02-11 RX ADMIN — Medication 3 MILLILITER(S): at 08:41

## 2023-02-11 RX ADMIN — Medication 3 MILLILITER(S): at 03:10

## 2023-02-11 RX ADMIN — CHLORHEXIDINE GLUCONATE 1 APPLICATION(S): 213 SOLUTION TOPICAL at 05:58

## 2023-02-11 RX ADMIN — APIXABAN 5 MILLIGRAM(S): 2.5 TABLET, FILM COATED ORAL at 05:57

## 2023-02-11 RX ADMIN — Medication 3 MILLILITER(S): at 14:32

## 2023-02-11 RX ADMIN — CARVEDILOL PHOSPHATE 6.25 MILLIGRAM(S): 80 CAPSULE, EXTENDED RELEASE ORAL at 17:06

## 2023-02-11 RX ADMIN — APIXABAN 5 MILLIGRAM(S): 2.5 TABLET, FILM COATED ORAL at 17:06

## 2023-02-11 RX ADMIN — Medication 3 MILLILITER(S): at 20:16

## 2023-02-11 RX ADMIN — Medication 1 DROP(S): at 17:11

## 2023-02-11 RX ADMIN — Medication 1: at 17:06

## 2023-02-11 RX ADMIN — SIMVASTATIN 20 MILLIGRAM(S): 20 TABLET, FILM COATED ORAL at 21:51

## 2023-02-11 RX ADMIN — Medication 20 MILLIGRAM(S): at 06:39

## 2023-02-11 RX ADMIN — PANTOPRAZOLE SODIUM 40 MILLIGRAM(S): 20 TABLET, DELAYED RELEASE ORAL at 06:26

## 2023-02-11 RX ADMIN — ANASTROZOLE 1 MILLIGRAM(S): 1 TABLET ORAL at 11:26

## 2023-02-11 RX ADMIN — Medication 1 DROP(S): at 06:40

## 2023-02-11 RX ADMIN — Medication 3: at 11:36

## 2023-02-11 NOTE — PROGRESS NOTE ADULT - PROBLEM SELECTOR PLAN 10
Failed BIPAP and AVAPS therapy. Remained hypercapnic on both. PCO2 57-84. Will need Trilogy/AVAPS-AE at home to adequately manage persistent hypercapnia. Without Trilogy/AVAPS-AE patient will remain hypercapnic increasing the possibility of readmission and intubation.  Trilogy at home pending approval from insurance Epidermal Closure: running

## 2023-02-11 NOTE — PROGRESS NOTE ADULT - ASSESSMENT
89 yo F, from home, with PMHx HTN, COPD, Afib, HFpEF (7/29/22 EF 55-60%, LVH, GIDD, Breast CA, and remote h/o LLE DVT p/w SOB and hypoxia to high 70s, admitted to medicine for AHRF 2/2 Acute diastolic CHF +/- COPD exacerbation and UTI (completed treatment). ICU consulted for concerns of AMS in the setting of Acute on chronic compensated respiratory acidosis requiring new trial of Bipap.  Patient downgraded to AI for further management. Currently on 2L NC. Will need BiPAP Q HS.   2/5 Change to AVAPS setting qHS.   02/08: patient medically optimized for discharge. Plan to d/c to home with AVAPS. On 2L NC and tolerating well. Awaiting for AVAPS machine. CM following.   02/09: Patient optimized medically and plan to home with family. Awaiting AVAPS.     2/11 Pt seen at bedside, NAD, no new complaints, saturation 98 % on 2 L.  Still pending authorization  for trilogy DME,  no discharge until then.

## 2023-02-11 NOTE — PROGRESS NOTE ADULT - ASSESSMENT
Patient is a 89y old  Female from home, ambulates with RW, with PMHx HTN, COPD, Afib, HFpEF (7/29/22 EF 55-60%, LVH, GIDD, mild pulmonary HTN), Breast CA, and remote h/o LLE DVT, now presents to the ER for evaluation of cough and shortness of breath. She has had dry cough for over a week which became productive and associated with wheezing over the last few days. Pts O2 saturation has been fluctuating in the 80s over the past week and this morning was in the high 70s, per granddaughters.  Pt was taken to PCP Dr. Bianchi's office who sent her to the ER for admission.. On admission, she has no fever, no Leukocytosis, but found to have positive Urine analysis and negative CXR. She has started on Ceftriaxone and The ID consult requested to assist with further evaluation and antibiotic management.    # Metabolic encephalopathy - resolved  # UTI  - Urine Cx grew Klebsiella- s/p completed the course of Ceftriaxone   # Hypoxia- on supplemental oxygenation  # Metabolic encephalopathy  # Hypercapnic Respiratory failure- on BIPAP- in ICU - 2/3/23- transferred out of ICU 2/4/23    would recommend:    1. Monitor OFF Abx as she completed the course   2. Supplemental oxygenation and bronchodilator as needed    3. Steroid as per primary team  4. PT/OOB to chair     d/w Family at the bed side     Attending Attestation:    Spent more than 35 minutes on total encounter, more than 50 % of the visit was spent counseling and/or coordinating care by the Attending physician.

## 2023-02-11 NOTE — PROGRESS NOTE ADULT - SUBJECTIVE AND OBJECTIVE BOX
CHIEF COMPLAINT:Patient is a 89y old  Female who presents with a chief complaint of AHRF.Pt appears comfortable.    	  REVIEW OF SYSTEMS:  CONSTITUTIONAL: No fever, weight loss, or fatigue  EYES: No eye pain, visual disturbances, or discharge  ENT:  No difficulty hearing, tinnitus, vertigo; No sinus or throat pain  NECK: No pain or stiffness  RESPIRATORY: No cough, wheezing, chills or hemoptysis; No Shortness of Breath  CARDIOVASCULAR: No chest pain, palpitations, passing out, dizziness, or leg swelling  GASTROINTESTINAL: No abdominal or epigastric pain. No nausea, vomiting, or hematemesis; No diarrhea or constipation. No melena or hematochezia.  GENITOURINARY: No dysuria, frequency, hematuria, or incontinence  NEUROLOGICAL: No headaches, memory loss, loss of strength, numbness, or tremors  SKIN: No itching, burning, rashes, or lesions   LYMPH Nodes: No enlarged glands  ENDOCRINE: No heat or cold intolerance; No hair loss  MUSCULOSKELETAL: No joint pain or swelling; No muscle, back, or extremity pain  PSYCHIATRIC: No depression, anxiety, mood swings, or difficulty sleeping  HEME/LYMPH: No easy bruising, or bleeding gums  ALLERGY AND IMMUNOLOGIC: No hives or eczema	        PHYSICAL EXAM:  T(C): 36.9 (02-11-23 @ 05:00), Max: 36.9 (02-11-23 @ 05:00)  HR: 82 (02-11-23 @ 06:30) (67 - 82)  BP: 143/72 (02-11-23 @ 06:30) (123/47 - 147/86)  RR: 19 (02-11-23 @ 06:30) (18 - 19)  SpO2: 98% (02-11-23 @ 06:30) (94% - 99%)  Wt(kg): --  I&O's Summary    10 Feb 2023 07:01  -  11 Feb 2023 07:00  --------------------------------------------------------  IN: 0 mL / OUT: 1400 mL / NET: -1400 mL        Appearance: Normal	  HEENT:   Normal oral mucosa, PERRL, EOMI	  Lymphatic: No lymphadenopathy  Cardiovascular: Normal S1 S2, No JVD, No murmurs, No edema  Respiratory: Lungs clear to auscultation	  Psychiatry: A & O x 3, Mood & affect appropriate  Gastrointestinal:  Soft, Non-tender, + BS	  Skin: No rashes, No ecchymoses, No cyanosis	  Neurologic: Non-focal  Extremities: Normal range of motion, No clubbing, cyanosis or edema  Vascular: Peripheral pulses palpable 2+ bilaterally    MEDICATIONS  (STANDING):  albuterol/ipratropium for Nebulization 3 milliLiter(s) Nebulizer every 6 hours  anastrozole 1 milliGRAM(s) Oral daily  apixaban 5 milliGRAM(s) Oral every 12 hours  artificial  tears Solution 1 Drop(s) Both EYES two times a day  carvedilol 6.25 milliGRAM(s) Oral every 12 hours  chlorhexidine 2% Cloths 1 Application(s) Topical <User Schedule>  insulin lispro (ADMELOG) corrective regimen sliding scale   SubCutaneous three times a day with meals  insulin lispro (ADMELOG) corrective regimen sliding scale   SubCutaneous at bedtime  pantoprazole    Tablet 40 milliGRAM(s) Oral before breakfast  predniSONE   Tablet   Oral   simvastatin 20 milliGRAM(s) Oral at bedtime    	  	  LABS:	 	                         12.9   7.38  )-----------( 220      ( 11 Feb 2023 07:30 )             41.0     02-11    137  |  102  |  13  ----------------------------<  123<H>  4.4   |  30  |  0.66    Ca    8.8      11 Feb 2023 07:30    TPro  5.9<L>  /  Alb  2.7<L>  /  TBili  0.9  /  DBili  x   /  AST  15  /  ALT  39  /  AlkPhos  95  02-10    proBNP: Serum Pro-Brain Natriuretic Peptide: 3916 pg/mL (01-30 @ 20:20)

## 2023-02-11 NOTE — PROGRESS NOTE ADULT - SUBJECTIVE AND OBJECTIVE BOX
Patient is seen and examined at the bed side, is afebrile. She is doing much better. The Family at the bed side.      REVIEW OF SYSTEMS: All other review systems are negative      ALLERGIES: Chicken (Stomach Upset)  losartan (Angioedema)      Vital Signs Last 24 Hrs  T(C): 36.8 (2023 12:22), Max: 36.9 (2023 05:00)  T(F): 98.3 (2023 12:22), Max: 98.4 (2023 05:00)  HR: 69 (2023 16:53) (67 - 89)  BP: 148/88 (2023 16:53) (123/47 - 148/88)  BP(mean): --  RR: 18 (2023 12:22) (18 - 19)  SpO2: 98% (:22) (94% - 99%)    Parameters below as of :22  Patient On (Oxygen Delivery Method): nasal cannula  O2 Flow (L/min): 3        PHYSICAL EXAM:  GENERAL: Not in distress, on oxygen via  NC  CHEST/LUNG:  Not using accessory muscles   HEART: s1 and s2 present  ABDOMEN:  Nontender and  Nondistended  EXTREMITIES: B/L  pedal  edema  CNS: awake and alert      LABS:                         12.9   7.38  )-----------( 220      ( 2023 07:30 )             41.0                           13.2   8.60  )-----------( 242      ( 2023 07:18 )             41.3             137  |  102  |  13  ----------------------------<  123<H>  4.4   |  30  |  0.66    Ca    8.8      2023 07:30    TPro  5.9<L>  /  Alb  2.7<L>  /  TBili  0.9  /  DBili  x   /  AST  15  /  ALT  39  /  AlkPhos  95  02-10    02-08    139  |  100  |  14  ----------------------------<  151<H>  4.3   |  33<H>  |  0.65    Ca    8.8      2023 08:04  Phos  2.9     02-08  Mg     2.4     02-08    TPro  6.1  /  Alb  2.8<L>  /  TBili  0.8  /  DBili  x   /  AST  17  /  ALT  35  /  AlkPhos  99  02-08    PT/INR - ( 2023 18:00 )   PT: 23.8 sec;   INR: 1.99 ratio      PTT - ( 2023 18:00 )  PTT:34.5 sec      CAPILLARY BLOOD GLUCOSE  POCT Blood Glucose.: 250 mg/dL (2023 01:11)  POCT Blood Glucose.: 176 mg/dL (2023 23:47)  POCT Blood Glucose.: 176 mg/dL (2023 16:31)        Urinalysis Basic - ( 2023 21:30 )  Color: Yellow / Appearance: very cloudy / S.010 / pH: x  Gluc: x / Ketone: Negative  / Bili: Negative / Urobili: Negative   Blood: x / Protein: 30 mg/dL / Nitrite: Negative   Leuk Esterase: Moderate / RBC: 25-50 /HPF / WBC >50 /HPF   Sq Epi: x / Non Sq Epi: Few /HPF / Bacteria: Moderate /HPF        MEDICATIONS  (STANDING):    albuterol/ipratropium for Nebulization 3 milliLiter(s) Nebulizer every 6 hours  anastrozole 1 milliGRAM(s) Oral daily  apixaban 5 milliGRAM(s) Oral every 12 hours  artificial  tears Solution 1 Drop(s) Both EYES two times a day  carvedilol 6.25 milliGRAM(s) Oral every 12 hours  chlorhexidine 2% Cloths 1 Application(s) Topical <User Schedule>  insulin lispro (ADMELOG) corrective regimen sliding scale   SubCutaneous three times a day with meals  insulin lispro (ADMELOG) corrective regimen sliding scale   SubCutaneous at bedtime  pantoprazole    Tablet 40 milliGRAM(s) Oral before breakfast  predniSONE   Tablet   Oral   simvastatin 20 milliGRAM(s) Oral at bedtime      RADIOLOGY & ADDITIONAL TESTS:    23: Xray Chest 1 View- PORTABLE-Urgent (23 @ 22:00) There are increased interstitial markings which may represent the   patient's underlying COPD or pulmonary venous congestion. No focal  consolidation or gross effusion.        MICROBIOLOGY DATA:    Culture - Urine (23 @ 21:30)   Specimen Source: Clean Catch Clean Catch (Midstream)   Culture Results:   >100,000 CFU/ml Klebsiella aerogenes (Previously Enterobacter)     Culture - Blood (23 @ 18:00)   Specimen Source: .Blood Blood-Peripheral   Culture Results: No growth to date.     Culture - Blood (23 @ 17:50)   Specimen Source: .Blood Blood-Peripheral   Culture Results: No growth to date.     Flu With COVID-19 By PATRICK (23 @ 18:00)   SARS-CoV-2 Result: NotDetec

## 2023-02-11 NOTE — PROGRESS NOTE ADULT - SUBJECTIVE AND OBJECTIVE BOX
Patient is a 89y old  Female who presents with a chief complaint of AHRF (11 Feb 2023 08:24)  Awake, alert, laying in bed in NAD    INTERVAL HPI/OVERNIGHT EVENTS:      VITAL SIGNS:  T(F): 98.4 (02-11-23 @ 05:00)  HR: 82 (02-11-23 @ 06:30)  BP: 143/72 (02-11-23 @ 06:30)  RR: 19 (02-11-23 @ 06:30)  SpO2: 98% (02-11-23 @ 06:30)  Wt(kg): --  I&O's Detail    10 Feb 2023 07:01  -  11 Feb 2023 07:00  --------------------------------------------------------  IN:  Total IN: 0 mL    OUT:    Voided (mL): 1400 mL  Total OUT: 1400 mL    Total NET: -1400 mL              REVIEW OF SYSTEMS:    CONSTITUTIONAL:  No fevers, chills, sweats    HEENT:  Eyes:  No diplopia or blurred vision. ENT:  No earache, sore throat or runny nose.    CARDIOVASCULAR:  No pressure, squeezing, tightness, or heaviness about the chest; no palpitations.    RESPIRATORY:  Per HPI    GASTROINTESTINAL:  No abdominal pain, nausea, vomiting or diarrhea.    GENITOURINARY:  No dysuria, frequency or urgency.    NEUROLOGIC:  No paresthesias, fasciculations, seizures or weakness.    PSYCHIATRIC:  No disorder of thought or mood.      PHYSICAL EXAM:    Constitutional: Well developed and nourished  Eyes:Perrla  ENMT: normal  Neck:supple  Respiratory: good air entry  Cardiovascular: S1 S2 regular  Gastrointestinal: Soft, Non tender  Extremities: No edema  Vascular:normal  Neurological:Awake, alert,Ox3  Musculoskeletal:Normal      MEDICATIONS  (STANDING):  albuterol/ipratropium for Nebulization 3 milliLiter(s) Nebulizer every 6 hours  anastrozole 1 milliGRAM(s) Oral daily  apixaban 5 milliGRAM(s) Oral every 12 hours  artificial  tears Solution 1 Drop(s) Both EYES two times a day  carvedilol 6.25 milliGRAM(s) Oral every 12 hours  chlorhexidine 2% Cloths 1 Application(s) Topical <User Schedule>  insulin lispro (ADMELOG) corrective regimen sliding scale   SubCutaneous three times a day with meals  insulin lispro (ADMELOG) corrective regimen sliding scale   SubCutaneous at bedtime  pantoprazole    Tablet 40 milliGRAM(s) Oral before breakfast  predniSONE   Tablet   Oral   simvastatin 20 milliGRAM(s) Oral at bedtime    MEDICATIONS  (PRN):  guaiFENesin Oral Liquid (Sugar-Free) 200 milliGRAM(s) Oral every 6 hours PRN Cough      Allergies    losartan (Angioedema)    Intolerances    Chicken (Stomach Upset)      LABS:                        12.9   7.38  )-----------( 220      ( 11 Feb 2023 07:30 )             41.0     02-11    137  |  102  |  13  ----------------------------<  123<H>  4.4   |  30  |  0.66    Ca    8.8      11 Feb 2023 07:30    TPro  5.9<L>  /  Alb  2.7<L>  /  TBili  0.9  /  DBili  x   /  AST  15  /  ALT  39  /  AlkPhos  95  02-10              CAPILLARY BLOOD GLUCOSE      POCT Blood Glucose.: 115 mg/dL (11 Feb 2023 08:03)  POCT Blood Glucose.: 121 mg/dL (10 Feb 2023 21:05)  POCT Blood Glucose.: 230 mg/dL (10 Feb 2023 17:06)  POCT Blood Glucose.: 233 mg/dL (10 Feb 2023 12:00)        RADIOLOGY & ADDITIONAL TESTS:    CXR:    Ct scan chest:    ekg;    echo:

## 2023-02-11 NOTE — PROGRESS NOTE ADULT - SUBJECTIVE AND OBJECTIVE BOX
NUNO LAST    SCU progress note    INTERVAL HPI/OVERNIGHT EVENTS: ***    DNR [ ]   DNI  [  ]    Covid - 19 PCR:     The 4Ms    What Matters Most: see GOC  Age appropriate Medications/Screen for High Risk Medication: Yes  Mentation: see CAM below  Mobility: defer to physical exam    The Confusion Assessment Method (CAM) Diagnostic Algorithm     1: Acute Onset or Fluctuating Course  - Is there evidence of an acute change in mental status from the patient’s baseline? Did the (abnormal) behavior  fluctuate during the day, that is, tend to come and go, or increase and decrease in severity?  [ ] YES [x ] NO     2: Inattention  - Did the patient have difficulty focusing attention, being easily distractible, or having difficulty keeping track of what was being said?  [ ] YES [x ] NO     3: Disorganized thinking  -Was the patient’s thinking disorganized or incoherent, such as rambling or irrelevant conversation, unclear or illogical flow of ideas, or unpredictable switching from subject to subject?  [ ] YES [x ] NO    4: Altered Level of consciousness?  [ ] YES [ x] NO    The diagnosis of delirium by CAM requires the presence of features 1 and 2 and either 3 or 4.    PRESSORS: [ ] YES [ x] NO    Cardiovascular:  Heart Failure  Acute   xAcute on Chronic  Chronic       carvedilol 6.25 milliGRAM(s) Oral every 12 hours    Pulmonary:  albuterol/ipratropium for Nebulization 3 milliLiter(s) Nebulizer every 6 hours  guaiFENesin Oral Liquid (Sugar-Free) 200 milliGRAM(s) Oral every 6 hours PRN    Hematalogic:  apixaban 5 milliGRAM(s) Oral every 12 hours    Other:  anastrozole 1 milliGRAM(s) Oral daily  artificial  tears Solution 1 Drop(s) Both EYES two times a day  chlorhexidine 2% Cloths 1 Application(s) Topical <User Schedule>  insulin lispro (ADMELOG) corrective regimen sliding scale   SubCutaneous three times a day with meals  insulin lispro (ADMELOG) corrective regimen sliding scale   SubCutaneous at bedtime  pantoprazole    Tablet 40 milliGRAM(s) Oral before breakfast  predniSONE   Tablet   Oral   simvastatin 20 milliGRAM(s) Oral at bedtime    albuterol/ipratropium for Nebulization 3 milliLiter(s) Nebulizer every 6 hours  anastrozole 1 milliGRAM(s) Oral daily  apixaban 5 milliGRAM(s) Oral every 12 hours  artificial  tears Solution 1 Drop(s) Both EYES two times a day  carvedilol 6.25 milliGRAM(s) Oral every 12 hours  chlorhexidine 2% Cloths 1 Application(s) Topical <User Schedule>  guaiFENesin Oral Liquid (Sugar-Free) 200 milliGRAM(s) Oral every 6 hours PRN  insulin lispro (ADMELOG) corrective regimen sliding scale   SubCutaneous three times a day with meals  insulin lispro (ADMELOG) corrective regimen sliding scale   SubCutaneous at bedtime  pantoprazole    Tablet 40 milliGRAM(s) Oral before breakfast  predniSONE   Tablet   Oral   simvastatin 20 milliGRAM(s) Oral at bedtime    Drug Dosing Weight  Height (cm): 154.9 (30 Jan 2023 16:23)  Weight (kg): 120 (03 Feb 2023 15:15)  BMI (kg/m2): 50 (03 Feb 2023 15:15)  BSA (m2): 2.13 (03 Feb 2023 15:15)    CENTRAL LINE: [ ] YES [ x] NO  LOCATION:   DATE INSERTED:  REMOVE: [ ] YES [ ] NO  EXPLAIN:    AGUIRRE: [ ] YES [ x] NO    DATE INSERTED:  REMOVE:  [ ] YES [ ] NO  EXPLAIN:    PAST MEDICAL & SURGICAL HISTORY:  Arthritis      Legally blind      Pre-diabetes      Breast cancer  right, no chemo or radiation      COPD exacerbation      Atrial fibrillation      HF (heart failure)  HFpEF 7/29      HTN (hypertension)      Deep vein thrombosis (DVT)  Left Lower Extremity      H/O CHF      No significant past surgical history                  02-10 @ 07:01  -  02-11 @ 07:00  --------------------------------------------------------  IN: 0 mL / OUT: 1400 mL / NET: -1400 mL            PHYSICAL EXAM:    GENERAL: NAD,obese   EYES: EOMI, PERRLA, conjunctiva and sclera clear  ENMT: No tonsillar erythema, exudates, or enlargement; Moist mucous membranes, Good dentition, No lesions  NECK: Supple, No JVD, Normal thyroid  NERVOUS SYSTEM:  Alert & Oriented X3, Good concentration; Motor Strength 5/5 B/L upper and lower extremities; DTRs 2+ intact and symmetric  CHEST/LUNG: Clear  bilaterally; No rales, rhonchi, wheezing  HEART: Regular rate and rhythm; No murmurs, rubs, or gallops  ABDOMEN: Soft, Nontender, Nondistended; Bowel sounds present  EXTREMITIES:  2+ Peripheral Pulses, No clubbing, cyanosis, or edema  LYMPH: No lymphadenopathy noted  SKIN: No rashes or lesions      LABS:  CBC Full  -  ( 11 Feb 2023 07:30 )  WBC Count : 7.38 K/uL  RBC Count : 4.17 M/uL  Hemoglobin : 12.9 g/dL  Hematocrit : 41.0 %  Platelet Count - Automated : 220 K/uL  Mean Cell Volume : 98.3 fl  Mean Cell Hemoglobin : 30.9 pg  Mean Cell Hemoglobin Concentration : 31.5 gm/dL  Auto Neutrophil # : x  Auto Lymphocyte # : x  Auto Monocyte # : x  Auto Eosinophil # : x  Auto Basophil # : x  Auto Neutrophil % : x  Auto Lymphocyte % : x  Auto Monocyte % : x  Auto Eosinophil % : x  Auto Basophil % : x    02-11    137  |  102  |  13  ----------------------------<  123<H>  4.4   |  30  |  0.66    Ca    8.8      11 Feb 2023 07:30    TPro  5.9<L>  /  Alb  2.7<L>  /  TBili  0.9  /  DBili  x   /  AST  15  /  ALT  39  /  AlkPhos  95  02-10              [ x ]  DVT Prophylaxis  [  ]  Nutrition, Brand, Rate         Goal Rate FULL CODE         Abnormal Nutritional Status -  Malnutrition   Cachexia      Morbid Obesity BMI >/=40    RADIOLOGY & ADDITIONAL STUDIES:  ***    Goals of Care Discussion with Family/Proxy/Other   - see note from/family meeting set up for...     NUNO ALST    SCU progress note    INTERVAL HPI/OVERNIGHT EVENTS: Comfortable    DNR [ ]   DNI  [  ]    Covid - 19 PCR:     The 4Ms    What Matters Most: see GOC  Age appropriate Medications/Screen for High Risk Medication: Yes  Mentation: see CAM below  Mobility: defer to physical exam    The Confusion Assessment Method (CAM) Diagnostic Algorithm     1: Acute Onset or Fluctuating Course  - Is there evidence of an acute change in mental status from the patient’s baseline? Did the (abnormal) behavior  fluctuate during the day, that is, tend to come and go, or increase and decrease in severity?  [ ] YES [x ] NO     2: Inattention  - Did the patient have difficulty focusing attention, being easily distractible, or having difficulty keeping track of what was being said?  [ ] YES [x ] NO     3: Disorganized thinking  -Was the patient’s thinking disorganized or incoherent, such as rambling or irrelevant conversation, unclear or illogical flow of ideas, or unpredictable switching from subject to subject?  [ ] YES [x ] NO    4: Altered Level of consciousness?  [ ] YES [ x] NO    The diagnosis of delirium by CAM requires the presence of features 1 and 2 and either 3 or 4.    PRESSORS: [ ] YES [ x] NO    Cardiovascular:  Heart Failure  Acute   xAcute on Chronic  Chronic       carvedilol 6.25 milliGRAM(s) Oral every 12 hours    Pulmonary:  albuterol/ipratropium for Nebulization 3 milliLiter(s) Nebulizer every 6 hours  guaiFENesin Oral Liquid (Sugar-Free) 200 milliGRAM(s) Oral every 6 hours PRN    Hematalogic:  apixaban 5 milliGRAM(s) Oral every 12 hours    Other:  anastrozole 1 milliGRAM(s) Oral daily  artificial  tears Solution 1 Drop(s) Both EYES two times a day  chlorhexidine 2% Cloths 1 Application(s) Topical <User Schedule>  insulin lispro (ADMELOG) corrective regimen sliding scale   SubCutaneous three times a day with meals  insulin lispro (ADMELOG) corrective regimen sliding scale   SubCutaneous at bedtime  pantoprazole    Tablet 40 milliGRAM(s) Oral before breakfast  predniSONE   Tablet   Oral   simvastatin 20 milliGRAM(s) Oral at bedtime    albuterol/ipratropium for Nebulization 3 milliLiter(s) Nebulizer every 6 hours  anastrozole 1 milliGRAM(s) Oral daily  apixaban 5 milliGRAM(s) Oral every 12 hours  artificial  tears Solution 1 Drop(s) Both EYES two times a day  carvedilol 6.25 milliGRAM(s) Oral every 12 hours  chlorhexidine 2% Cloths 1 Application(s) Topical <User Schedule>  guaiFENesin Oral Liquid (Sugar-Free) 200 milliGRAM(s) Oral every 6 hours PRN  insulin lispro (ADMELOG) corrective regimen sliding scale   SubCutaneous three times a day with meals  insulin lispro (ADMELOG) corrective regimen sliding scale   SubCutaneous at bedtime  pantoprazole    Tablet 40 milliGRAM(s) Oral before breakfast  predniSONE   Tablet   Oral   simvastatin 20 milliGRAM(s) Oral at bedtime    Drug Dosing Weight  Height (cm): 154.9 (30 Jan 2023 16:23)  Weight (kg): 120 (03 Feb 2023 15:15)  BMI (kg/m2): 50 (03 Feb 2023 15:15)  BSA (m2): 2.13 (03 Feb 2023 15:15)    CENTRAL LINE: [ ] YES [ x] NO  LOCATION:   DATE INSERTED:  REMOVE: [ ] YES [ ] NO  EXPLAIN:    AGUIRRE: [ ] YES [ x] NO    DATE INSERTED:  REMOVE:  [ ] YES [ ] NO  EXPLAIN:    PAST MEDICAL & SURGICAL HISTORY:  Arthritis      Legally blind      Pre-diabetes      Breast cancer  right, no chemo or radiation      COPD exacerbation      Atrial fibrillation      HF (heart failure)  HFpEF 7/29      HTN (hypertension)      Deep vein thrombosis (DVT)  Left Lower Extremity      H/O CHF      No significant past surgical history                  02-10 @ 07:01  -  02-11 @ 07:00  --------------------------------------------------------  IN: 0 mL / OUT: 1400 mL / NET: -1400 mL            PHYSICAL EXAM:    GENERAL: NAD,obese   EYES: EOMI, PERRLA, conjunctiva and sclera clear  ENMT: No tonsillar erythema, exudates, or enlargement; Moist mucous membranes, Good dentition, No lesions  NECK: Supple, No JVD, Normal thyroid  NERVOUS SYSTEM:  Alert & Oriented X3, Good concentration; Motor Strength 5/5 B/L upper and lower extremities; DTRs 2+ intact and symmetric  CHEST/LUNG: Clear  bilaterally; No rales, rhonchi, wheezing  HEART: Regular rate and rhythm; No murmurs, rubs, or gallops  ABDOMEN: Soft, Nontender, Nondistended; Bowel sounds present  EXTREMITIES:  2+ Peripheral Pulses, No clubbing, cyanosis, or edema  LYMPH: No lymphadenopathy noted  SKIN: No rashes or lesions      LABS:  CBC Full  -  ( 11 Feb 2023 07:30 )  WBC Count : 7.38 K/uL  RBC Count : 4.17 M/uL  Hemoglobin : 12.9 g/dL  Hematocrit : 41.0 %  Platelet Count - Automated : 220 K/uL  Mean Cell Volume : 98.3 fl  Mean Cell Hemoglobin : 30.9 pg  Mean Cell Hemoglobin Concentration : 31.5 gm/dL  Auto Neutrophil # : x  Auto Lymphocyte # : x  Auto Monocyte # : x  Auto Eosinophil # : x  Auto Basophil # : x  Auto Neutrophil % : x  Auto Lymphocyte % : x  Auto Monocyte % : x  Auto Eosinophil % : x  Auto Basophil % : x    02-11    137  |  102  |  13  ----------------------------<  123<H>  4.4   |  30  |  0.66    Ca    8.8      11 Feb 2023 07:30    TPro  5.9<L>  /  Alb  2.7<L>  /  TBili  0.9  /  DBili  x   /  AST  15  /  ALT  39  /  AlkPhos  95  02-10              [ x ]  DVT Prophylaxis  [  ]  Nutrition, Brand, Rate         Goal Rate FULL CODE         Abnormal Nutritional Status -  Malnutrition   Cachexia      Morbid Obesity BMI >/=40    RADIOLOGY & ADDITIONAL STUDIES:  ***    Goals of Care Discussion with Family/Proxy/Other   - see note from/family meeting set up for...

## 2023-02-11 NOTE — PROGRESS NOTE ADULT - SUBJECTIVE AND OBJECTIVE BOX
Patient was seen and examined  Patient is a 89y old  Female who presents with a chief complaint of AHRF (10 Feb 2023 12:04)      INTERVAL HPI/OVERNIGHT EVENTS:  T(C): 36.9 (02-11-23 @ 05:00), Max: 36.9 (02-11-23 @ 05:00)  HR: 82 (02-11-23 @ 06:30) (67 - 95)  BP: 143/72 (02-11-23 @ 06:30) (107/57 - 147/86)  RR: 19 (02-11-23 @ 06:30) (18 - 19)  SpO2: 98% (02-11-23 @ 06:30) (94% - 99%)  Wt(kg): --  I&O's Summary    10 Feb 2023 07:01  -  11 Feb 2023 07:00  --------------------------------------------------------  IN: 0 mL / OUT: 1400 mL / NET: -1400 mL        LABS:                        12.9   7.38  )-----------( 220      ( 11 Feb 2023 07:30 )             41.0     02-11    137  |  102  |  13  ----------------------------<  123<H>  4.4   |  30  |  0.66    Ca    8.8      11 Feb 2023 07:30    TPro  5.9<L>  /  Alb  2.7<L>  /  TBili  0.9  /  DBili  x   /  AST  15  /  ALT  39  /  AlkPhos  95  02-10        CAPILLARY BLOOD GLUCOSE      POCT Blood Glucose.: 115 mg/dL (11 Feb 2023 08:03)  POCT Blood Glucose.: 121 mg/dL (10 Feb 2023 21:05)  POCT Blood Glucose.: 230 mg/dL (10 Feb 2023 17:06)  POCT Blood Glucose.: 233 mg/dL (10 Feb 2023 12:00)              MEDICATIONS  (STANDING):  albuterol/ipratropium for Nebulization 3 milliLiter(s) Nebulizer every 6 hours  anastrozole 1 milliGRAM(s) Oral daily  apixaban 5 milliGRAM(s) Oral every 12 hours  artificial  tears Solution 1 Drop(s) Both EYES two times a day  carvedilol 6.25 milliGRAM(s) Oral every 12 hours  chlorhexidine 2% Cloths 1 Application(s) Topical <User Schedule>  insulin lispro (ADMELOG) corrective regimen sliding scale   SubCutaneous three times a day with meals  insulin lispro (ADMELOG) corrective regimen sliding scale   SubCutaneous at bedtime  pantoprazole    Tablet 40 milliGRAM(s) Oral before breakfast  predniSONE   Tablet   Oral   simvastatin 20 milliGRAM(s) Oral at bedtime    MEDICATIONS  (PRN):  guaiFENesin Oral Liquid (Sugar-Free) 200 milliGRAM(s) Oral every 6 hours PRN Cough      RADIOLOGY & ADDITIONAL TESTS:    Imaging Personally Reviewed:  [ ] YES  [ ] NO    REVIEW OF SYSTEMS:  CONSTITUTIONAL: No fever, weight loss, or fatigue  EYES: No eye pain, visual disturbances, or discharge  ENMT:  No difficulty hearing, tinnitus, vertigo; No sinus or throat pain  NECK: No pain or stiffness  BREASTS: No pain, masses, or nipple discharge  RESPIRATORY: No cough, wheezing, chills or hemoptysis; No shortness of breath  CARDIOVASCULAR: No chest pain, palpitations, dizziness, or leg swelling  GASTROINTESTINAL: No abdominal or epigastric pain. No nausea, vomiting, or hematemesis; No diarrhea or constipation. No melena or hematochezia.  GENITOURINARY: No dysuria, frequency, hematuria, or incontinence  NEUROLOGICAL: No headaches, memory loss, loss of strength, numbness, or tremors  SKIN: No itching, burning, rashes, or lesions   LYMPH NODES: No enlarged glands  ENDOCRINE: No heat or cold intolerance; No hair loss  MUSCULOSKELETAL: No joint pain or swelling; No muscle, back, or extremity pain  PSYCHIATRIC: No depression, anxiety, mood swings, or difficulty sleeping  HEME/LYMPH: No easy bruising, or bleeding gums  ALLERY AND IMMUNOLOGIC: No hives or eczema      Consultant(s) Notes Reviewed:  [ x ] YES  [ ] NO    PHYSICAL EXAM:  GENERAL: NAD, well-groomed, well-developed  HEAD:  Atraumatic, Normocephalic  EYES: blind   ENMT: No tonsillar erythema, exudates, or enlargement; Moist mucous membranes, Good dentition, No lesions  NECK: Supple, No JVD, Normal thyroid  NERVOUS SYSTEM:  Alert & Oriented X3, Good concentration; Motor Strength 5/5 B/L upper and lower extremities; DTRs 2+ intact and symmetric  CHEST/LUNG: Clear to percussion bilaterally; No rales, rhonchi, wheezing, or rubs  HEART: Regular rate and rhythm; No murmurs, rubs, or gallops  ABDOMEN: Soft, Nontender, Nondistended; Bowel sounds present  EXTREMITIES:  2+ Peripheral Pulses, No clubbing, cyanosis, or edema  LYMPH: No lymphadenopathy noted  SKIN: No rashes or lesions    Care Discussed with Consultants/Other Providers [ x] YES  [ ] NO

## 2023-02-12 ENCOUNTER — TRANSCRIPTION ENCOUNTER (OUTPATIENT)
Age: 88
End: 2023-02-12

## 2023-02-12 LAB
GLUCOSE BLDC GLUCOMTR-MCNC: 131 MG/DL — HIGH (ref 70–99)
GLUCOSE BLDC GLUCOMTR-MCNC: 135 MG/DL — HIGH (ref 70–99)
GLUCOSE BLDC GLUCOMTR-MCNC: 174 MG/DL — HIGH (ref 70–99)
GLUCOSE BLDC GLUCOMTR-MCNC: 264 MG/DL — HIGH (ref 70–99)

## 2023-02-12 RX ADMIN — PANTOPRAZOLE SODIUM 40 MILLIGRAM(S): 20 TABLET, DELAYED RELEASE ORAL at 06:20

## 2023-02-12 RX ADMIN — Medication 10 MILLIGRAM(S): at 05:50

## 2023-02-12 RX ADMIN — CHLORHEXIDINE GLUCONATE 1 APPLICATION(S): 213 SOLUTION TOPICAL at 05:40

## 2023-02-12 RX ADMIN — Medication 1 DROP(S): at 05:48

## 2023-02-12 RX ADMIN — APIXABAN 5 MILLIGRAM(S): 2.5 TABLET, FILM COATED ORAL at 05:48

## 2023-02-12 RX ADMIN — Medication 1: at 17:19

## 2023-02-12 RX ADMIN — Medication 3: at 11:50

## 2023-02-12 RX ADMIN — CARVEDILOL PHOSPHATE 6.25 MILLIGRAM(S): 80 CAPSULE, EXTENDED RELEASE ORAL at 18:13

## 2023-02-12 RX ADMIN — ANASTROZOLE 1 MILLIGRAM(S): 1 TABLET ORAL at 14:08

## 2023-02-12 RX ADMIN — Medication 1 DROP(S): at 18:14

## 2023-02-12 RX ADMIN — Medication 3 MILLILITER(S): at 15:14

## 2023-02-12 RX ADMIN — CARVEDILOL PHOSPHATE 6.25 MILLIGRAM(S): 80 CAPSULE, EXTENDED RELEASE ORAL at 05:48

## 2023-02-12 RX ADMIN — SIMVASTATIN 20 MILLIGRAM(S): 20 TABLET, FILM COATED ORAL at 21:44

## 2023-02-12 RX ADMIN — APIXABAN 5 MILLIGRAM(S): 2.5 TABLET, FILM COATED ORAL at 18:13

## 2023-02-12 RX ADMIN — Medication 3 MILLILITER(S): at 08:43

## 2023-02-12 RX ADMIN — Medication 3 MILLILITER(S): at 20:53

## 2023-02-12 RX ADMIN — Medication 3 MILLILITER(S): at 03:31

## 2023-02-12 NOTE — PROGRESS NOTE ADULT - ASSESSMENT
89 yo F, from home, with PMHx HTN, COPD, Afib, HFpEF (7/29/22 EF 55-60%, LVH, GIDD, Breast CA, and remote h/o LLE DVT p/w SOB and hypoxia to high 70s, admitted to medicine for AHRF 2/2 Acute diastolic CHF +/- COPD exacerbation and UTI (completed treatment). ICU consulted for concerns of AMS in the setting of Acute on chronic compensated respiratory acidosis requiring new trial of Bipap.  Patient downgraded to AI for further management. Currently on 2L NC. Will need BiPAP Q HS.   2/5 Change to AVAPS setting qHS.   02/08: patient medically optimized for discharge. Plan to d/c to home with AVAPS. On 2L NC and tolerating well. Awaiting for AVAPS machine. CM following.   02/09: Patient optimized medically and plan to home with family. Awaiting AVAPS.     2/11 Pt seen at bedside, NAD, no new complaints, saturation 98 % on 2 L.  Still pending authorization  for [facility],  no discharge until then.  2/12 remains comfortable, saturating well, on AVAPs at night

## 2023-02-12 NOTE — PROGRESS NOTE ADULT - SUBJECTIVE AND OBJECTIVE BOX
NUNO LAST    SCU progress note    INTERVAL HPI/OVERNIGHT EVENTS: ***    DNR [ ]   DNI  [  ]    Covid - 19 PCR:     The 4Ms    What Matters Most: see GOC  Age appropriate Medications/Screen for High Risk Medication: Yes  Mentation: see CAM below  Mobility: defer to physical exam    The Confusion Assessment Method (CAM) Diagnostic Algorithm     1: Acute Onset or Fluctuating Course  - Is there evidence of an acute change in mental status from the patient’s baseline? Did the (abnormal) behavior  fluctuate during the day, that is, tend to come and go, or increase and decrease in severity?  [ ] YES [ ] NO     2: Inattention  - Did the patient have difficulty focusing attention, being easily distractible, or having difficulty keeping track of what was being said?  [ ] YES [x ] NO     3: Disorganized thinking  -Was the patient’s thinking disorganized or incoherent, such as rambling or irrelevant conversation, unclear or illogical flow of ideas, or unpredictable switching from subject to subject?  [ ] YES [x ] NO    4: Altered Level of consciousness?  [ ] YES [x ] NO    The diagnosis of delirium by CAM requires the presence of features 1 and 2 and either 3 or 4.    PRESSORS: [ ] YES [x ] NO    Cardiovascular:  Heart Failure  Acute   Acute on Chronic  Chronic       carvedilol 6.25 milliGRAM(s) Oral every 12 hours    Pulmonary:  albuterol/ipratropium for Nebulization 3 milliLiter(s) Nebulizer every 6 hours  guaiFENesin Oral Liquid (Sugar-Free) 200 milliGRAM(s) Oral every 6 hours PRN    Hematalogic:  apixaban 5 milliGRAM(s) Oral every 12 hours    Other:  anastrozole 1 milliGRAM(s) Oral daily  artificial  tears Solution 1 Drop(s) Both EYES two times a day  chlorhexidine 2% Cloths 1 Application(s) Topical <User Schedule>  insulin lispro (ADMELOG) corrective regimen sliding scale   SubCutaneous three times a day with meals  insulin lispro (ADMELOG) corrective regimen sliding scale   SubCutaneous at bedtime  pantoprazole    Tablet 40 milliGRAM(s) Oral before breakfast  predniSONE   Tablet   Oral   predniSONE   Tablet 10 milliGRAM(s) Oral daily  simvastatin 20 milliGRAM(s) Oral at bedtime    albuterol/ipratropium for Nebulization 3 milliLiter(s) Nebulizer every 6 hours  anastrozole 1 milliGRAM(s) Oral daily  apixaban 5 milliGRAM(s) Oral every 12 hours  artificial  tears Solution 1 Drop(s) Both EYES two times a day  carvedilol 6.25 milliGRAM(s) Oral every 12 hours  chlorhexidine 2% Cloths 1 Application(s) Topical <User Schedule>  guaiFENesin Oral Liquid (Sugar-Free) 200 milliGRAM(s) Oral every 6 hours PRN  insulin lispro (ADMELOG) corrective regimen sliding scale   SubCutaneous three times a day with meals  insulin lispro (ADMELOG) corrective regimen sliding scale   SubCutaneous at bedtime  pantoprazole    Tablet 40 milliGRAM(s) Oral before breakfast  predniSONE   Tablet   Oral   predniSONE   Tablet 10 milliGRAM(s) Oral daily  simvastatin 20 milliGRAM(s) Oral at bedtime    Drug Dosing Weight  Height (cm): 154.9 (30 Jan 2023 16:23)  Weight (kg): 120 (03 Feb 2023 15:15)  BMI (kg/m2): 50 (03 Feb 2023 15:15)  BSA (m2): 2.13 (03 Feb 2023 15:15)    CENTRAL LINE: [ ] YES [ ] NO  LOCATION:   DATE INSERTED:  REMOVE: [ ] YES [ ] NO  EXPLAIN:    AGUIRRE: [ ] YES [ ] NO    DATE INSERTED:  REMOVE:  [ ] YES [ ] NO  EXPLAIN:    PAST MEDICAL & SURGICAL HISTORY:  Arthritis      Legally blind      Pre-diabetes      Breast cancer  right, no chemo or radiation      COPD exacerbation      Atrial fibrillation      HF (heart failure)  HFpEF 7/29      HTN (hypertension)      Deep vein thrombosis (DVT)  Left Lower Extremity      H/O CHF      No significant past surgical history                  02-11 @ 07:01  -  02-12 @ 07:00  --------------------------------------------------------  IN: 0 mL / OUT: 1500 mL / NET: -1500 mL            PHYSICAL EXAM:    GENERAL: NAD, obese   NERVOUS SYSTEM:  Alert & Oriented X3,Motor Strength 5/5 B/L upper and lower extremities; DTRs 2+ intact and symmetric  CHEST/LUNG: Clear to percussion bilaterally;   HEART: Regular rate and rhythm;  ABDOMEN: Soft, Nontender, Nondistended; Bowel sounds present  EXTREMITIES:  2+ Peripheral Pulses, No clubbing, cyanosis, or edema  LYMPH: No lymphadenopathy noted  SKIN: No rashes or lesions      LABS:  CBC Full  -  ( 11 Feb 2023 07:30 )  WBC Count : 7.38 K/uL  RBC Count : 4.17 M/uL  Hemoglobin : 12.9 g/dL  Hematocrit : 41.0 %  Platelet Count - Automated : 220 K/uL  Mean Cell Volume : 98.3 fl  Mean Cell Hemoglobin : 30.9 pg  Mean Cell Hemoglobin Concentration : 31.5 gm/dL  Auto Neutrophil # : x  Auto Lymphocyte # : x  Auto Monocyte # : x  Auto Eosinophil # : x  Auto Basophil # : x  Auto Neutrophil % : x  Auto Lymphocyte % : x  Auto Monocyte % : x  Auto Eosinophil % : x  Auto Basophil % : x    02-11    137  |  102  |  13  ----------------------------<  123<H>  4.4   |  30  |  0.66    Ca    8.8      11 Feb 2023 07:30                [x  ]  DVT Prophylaxis  [  ]  Nutrition, Brand, Rate         Goal Rate        Abnormal Nutritional Status -  Malnutrition   Cachexia      Morbid Obesity BMI >/=40    RADIOLOGY & ADDITIONAL STUDIES:  ***    Goals of Care Discussion with Family/Proxy/Other   - see note from/family meeting set up for...     NUNO LAST    SCU progress note    INTERVAL HPI/OVERNIGHT EVENTS: None    DNR [ ]   DNI  [  ]    Covid - 19 PCR:     The 4Ms    What Matters Most: see GOC  Age appropriate Medications/Screen for High Risk Medication: Yes  Mentation: see CAM below  Mobility: defer to physical exam    The Confusion Assessment Method (CAM) Diagnostic Algorithm     1: Acute Onset or Fluctuating Course  - Is there evidence of an acute change in mental status from the patient’s baseline? Did the (abnormal) behavior  fluctuate during the day, that is, tend to come and go, or increase and decrease in severity?  [ ] YES [ ] NO     2: Inattention  - Did the patient have difficulty focusing attention, being easily distractible, or having difficulty keeping track of what was being said?  [ ] YES [x ] NO     3: Disorganized thinking  -Was the patient’s thinking disorganized or incoherent, such as rambling or irrelevant conversation, unclear or illogical flow of ideas, or unpredictable switching from subject to subject?  [ ] YES [x ] NO    4: Altered Level of consciousness?  [ ] YES [x ] NO    The diagnosis of delirium by CAM requires the presence of features 1 and 2 and either 3 or 4.    PRESSORS: [ ] YES [x ] NO    Cardiovascular:  Heart Failure  Acute   Acute on Chronic  Chronic       carvedilol 6.25 milliGRAM(s) Oral every 12 hours    Pulmonary:  albuterol/ipratropium for Nebulization 3 milliLiter(s) Nebulizer every 6 hours  guaiFENesin Oral Liquid (Sugar-Free) 200 milliGRAM(s) Oral every 6 hours PRN    Hematalogic:  apixaban 5 milliGRAM(s) Oral every 12 hours    Other:  anastrozole 1 milliGRAM(s) Oral daily  artificial  tears Solution 1 Drop(s) Both EYES two times a day  chlorhexidine 2% Cloths 1 Application(s) Topical <User Schedule>  insulin lispro (ADMELOG) corrective regimen sliding scale   SubCutaneous three times a day with meals  insulin lispro (ADMELOG) corrective regimen sliding scale   SubCutaneous at bedtime  pantoprazole    Tablet 40 milliGRAM(s) Oral before breakfast  predniSONE   Tablet   Oral   predniSONE   Tablet 10 milliGRAM(s) Oral daily  simvastatin 20 milliGRAM(s) Oral at bedtime    albuterol/ipratropium for Nebulization 3 milliLiter(s) Nebulizer every 6 hours  anastrozole 1 milliGRAM(s) Oral daily  apixaban 5 milliGRAM(s) Oral every 12 hours  artificial  tears Solution 1 Drop(s) Both EYES two times a day  carvedilol 6.25 milliGRAM(s) Oral every 12 hours  chlorhexidine 2% Cloths 1 Application(s) Topical <User Schedule>  guaiFENesin Oral Liquid (Sugar-Free) 200 milliGRAM(s) Oral every 6 hours PRN  insulin lispro (ADMELOG) corrective regimen sliding scale   SubCutaneous three times a day with meals  insulin lispro (ADMELOG) corrective regimen sliding scale   SubCutaneous at bedtime  pantoprazole    Tablet 40 milliGRAM(s) Oral before breakfast  predniSONE   Tablet   Oral   predniSONE   Tablet 10 milliGRAM(s) Oral daily  simvastatin 20 milliGRAM(s) Oral at bedtime    Drug Dosing Weight  Height (cm): 154.9 (30 Jan 2023 16:23)  Weight (kg): 120 (03 Feb 2023 15:15)  BMI (kg/m2): 50 (03 Feb 2023 15:15)  BSA (m2): 2.13 (03 Feb 2023 15:15)    CENTRAL LINE: [ ] YES [ ] NO  LOCATION:   DATE INSERTED:  REMOVE: [ ] YES [ ] NO  EXPLAIN:    AGUIRRE: [ ] YES [ ] NO    DATE INSERTED:  REMOVE:  [ ] YES [ ] NO  EXPLAIN:    PAST MEDICAL & SURGICAL HISTORY:  Arthritis      Legally blind      Pre-diabetes      Breast cancer  right, no chemo or radiation      COPD exacerbation      Atrial fibrillation      HF (heart failure)  HFpEF 7/29      HTN (hypertension)      Deep vein thrombosis (DVT)  Left Lower Extremity      H/O CHF      No significant past surgical history                  02-11 @ 07:01  -  02-12 @ 07:00  --------------------------------------------------------  IN: 0 mL / OUT: 1500 mL / NET: -1500 mL            PHYSICAL EXAM:    GENERAL: NAD, obese   NERVOUS SYSTEM:  Alert & Oriented X3,Motor Strength 5/5 B/L upper and lower extremities; DTRs 2+ intact and symmetric  CHEST/LUNG: Clear to percussion bilaterally;   HEART: Regular rate and rhythm;  ABDOMEN: Soft, Nontender, Nondistended; Bowel sounds present  EXTREMITIES:  2+ Peripheral Pulses, No clubbing, cyanosis, or edema  LYMPH: No lymphadenopathy noted  SKIN: No rashes or lesions      LABS:  CBC Full  -  ( 11 Feb 2023 07:30 )  WBC Count : 7.38 K/uL  RBC Count : 4.17 M/uL  Hemoglobin : 12.9 g/dL  Hematocrit : 41.0 %  Platelet Count - Automated : 220 K/uL  Mean Cell Volume : 98.3 fl  Mean Cell Hemoglobin : 30.9 pg  Mean Cell Hemoglobin Concentration : 31.5 gm/dL  Auto Neutrophil # : x  Auto Lymphocyte # : x  Auto Monocyte # : x  Auto Eosinophil # : x  Auto Basophil # : x  Auto Neutrophil % : x  Auto Lymphocyte % : x  Auto Monocyte % : x  Auto Eosinophil % : x  Auto Basophil % : x    02-11    137  |  102  |  13  ----------------------------<  123<H>  4.4   |  30  |  0.66    Ca    8.8      11 Feb 2023 07:30                [x  ]  DVT Prophylaxis  [  ]  Nutrition, Brand, Rate         Goal Rate        Abnormal Nutritional Status -  Malnutrition   Cachexia      Morbid Obesity BMI >/=40    RADIOLOGY & ADDITIONAL STUDIES:  ***    Goals of Care Discussion with Family/Proxy/Other   - see note from/family meeting set up for...

## 2023-02-12 NOTE — PROGRESS NOTE ADULT - SUBJECTIVE AND OBJECTIVE BOX
Patient was seen and examined  Patient is a 89y old  Female who presents with a chief complaint of AHRF (11 Feb 2023 15:51)      INTERVAL HPI/OVERNIGHT EVENTS:  T(C): 36.2 (02-12-23 @ 05:00), Max: 36.8 (02-11-23 @ 12:22)  HR: 74 (02-12-23 @ 05:00) (69 - 89)  BP: 136/93 (02-12-23 @ 05:00) (125/68 - 148/88)  RR: 17 (02-12-23 @ 05:00) (17 - 19)  SpO2: 100% (02-12-23 @ 05:00) (98% - 100%)  Wt(kg): --  I&O's Summary    11 Feb 2023 07:01  -  12 Feb 2023 07:00  --------------------------------------------------------  IN: 0 mL / OUT: 1500 mL / NET: -1500 mL        LABS:                        12.9   7.38  )-----------( 220      ( 11 Feb 2023 07:30 )             41.0     02-11    137  |  102  |  13  ----------------------------<  123<H>  4.4   |  30  |  0.66    Ca    8.8      11 Feb 2023 07:30          CAPILLARY BLOOD GLUCOSE      POCT Blood Glucose.: 135 mg/dL (12 Feb 2023 07:42)  POCT Blood Glucose.: 197 mg/dL (11 Feb 2023 21:13)  POCT Blood Glucose.: 200 mg/dL (11 Feb 2023 16:33)  POCT Blood Glucose.: 268 mg/dL (11 Feb 2023 11:29)              MEDICATIONS  (STANDING):  albuterol/ipratropium for Nebulization 3 milliLiter(s) Nebulizer every 6 hours  anastrozole 1 milliGRAM(s) Oral daily  apixaban 5 milliGRAM(s) Oral every 12 hours  artificial  tears Solution 1 Drop(s) Both EYES two times a day  carvedilol 6.25 milliGRAM(s) Oral every 12 hours  chlorhexidine 2% Cloths 1 Application(s) Topical <User Schedule>  insulin lispro (ADMELOG) corrective regimen sliding scale   SubCutaneous three times a day with meals  insulin lispro (ADMELOG) corrective regimen sliding scale   SubCutaneous at bedtime  pantoprazole    Tablet 40 milliGRAM(s) Oral before breakfast  predniSONE   Tablet   Oral   predniSONE   Tablet 10 milliGRAM(s) Oral daily  simvastatin 20 milliGRAM(s) Oral at bedtime    MEDICATIONS  (PRN):  guaiFENesin Oral Liquid (Sugar-Free) 200 milliGRAM(s) Oral every 6 hours PRN Cough      RADIOLOGY & ADDITIONAL TESTS:    Imaging Personally Reviewed:  [ ] YES  [ ] NO    REVIEW OF SYSTEMS:  CONSTITUTIONAL: No fever, weight loss, or fatigue  EYES: No eye pain, visual disturbances, or discharge  ENMT:  No difficulty hearing, tinnitus, vertigo; No sinus or throat pain  NECK: No pain or stiffness  BREASTS: No pain, masses, or nipple discharge  RESPIRATORY: No cough, wheezing, chills or hemoptysis; No shortness of breath  CARDIOVASCULAR: No chest pain, palpitations, dizziness, or leg swelling  GASTROINTESTINAL: No abdominal or epigastric pain. No nausea, vomiting, or hematemesis; No diarrhea or constipation. No melena or hematochezia.  GENITOURINARY: No dysuria, frequency, hematuria, or incontinence  NEUROLOGICAL: No headaches, memory loss, loss of strength, numbness, or tremors  SKIN: No itching, burning, rashes, or lesions   LYMPH NODES: No enlarged glands  ENDOCRINE: No heat or cold intolerance; No hair loss  MUSCULOSKELETAL: No joint pain or swelling; No muscle, back, or extremity pain  PSYCHIATRIC: No depression, anxiety, mood swings, or difficulty sleeping  HEME/LYMPH: No easy bruising, or bleeding gums  ALLERY AND IMMUNOLOGIC: No hives or eczema      Consultant(s) Notes Reviewed:  [ x ] YES  [ ] NO    PHYSICAL EXAM:  GENERAL: NAD, well-groomed, well-developed  HEAD:  Atraumatic, Normocephalic  EYES: blind   ENMT: No tonsillar erythema, exudates, or enlargement; Moist mucous membranes, Good dentition, No lesions  NECK: Supple, No JVD, Normal thyroid  NERVOUS SYSTEM:  Alert & Oriented X3, Good concentration; Motor Strength 5/5 B/L upper and lower extremities; DTRs 2+ intact and symmetric  CHEST/LUNG: Clear to percussion bilaterally; No rales, rhonchi, wheezing, or rubs  HEART: Regular rate and rhythm; No murmurs, rubs, or gallops  ABDOMEN: Soft, Nontender, Nondistended; Bowel sounds present  EXTREMITIES:  2+ Peripheral Pulses, No clubbing, cyanosis, or edema  LYMPH: No lymphadenopathy noted  SKIN: No rashes or lesions    Care Discussed with Consultants/Other Providers [ x] YES  [ ] NO

## 2023-02-12 NOTE — PROGRESS NOTE ADULT - SUBJECTIVE AND OBJECTIVE BOX
Patient is a 89y old  Female who presents with a chief complaint of AHRF (12 Feb 2023 08:59)  Awake, alert, is clinically stable on oxygen supp via NC, in NAD    INTERVAL HPI/OVERNIGHT EVENTS:      VITAL SIGNS:  T(F): 97.1 (02-12-23 @ 05:00)  HR: 74 (02-12-23 @ 05:00)  BP: 136/93 (02-12-23 @ 05:00)  RR: 17 (02-12-23 @ 05:00)  SpO2: 100% (02-12-23 @ 05:00)  Wt(kg): --  I&O's Detail    11 Feb 2023 07:01  -  12 Feb 2023 07:00  --------------------------------------------------------  IN:  Total IN: 0 mL    OUT:    Voided (mL): 1500 mL  Total OUT: 1500 mL    Total NET: -1500 mL              REVIEW OF SYSTEMS:    CONSTITUTIONAL:  No fevers, chills, sweats    HEENT:  Eyes:  No diplopia or blurred vision. ENT:  No earache, sore throat or runny nose.    CARDIOVASCULAR:  No pressure, squeezing, tightness, or heaviness about the chest; no palpitations.    RESPIRATORY:  Per HPI    GASTROINTESTINAL:  No abdominal pain, nausea, vomiting or diarrhea.    GENITOURINARY:  No dysuria, frequency or urgency.    NEUROLOGIC:  No paresthesias, fasciculations, seizures or weakness.    PSYCHIATRIC:  No disorder of thought or mood.      PHYSICAL EXAM:    Constitutional: Well developed and nourished  Eyes:Perrla  ENMT: normal  Neck:supple  Respiratory: good air entry  Cardiovascular: S1 S2 regular  Gastrointestinal: Soft, Non tender  Extremities: No edema  Vascular:normal  Neurological:Awake, alert,Ox3  Musculoskeletal:Normal      MEDICATIONS  (STANDING):  albuterol/ipratropium for Nebulization 3 milliLiter(s) Nebulizer every 6 hours  anastrozole 1 milliGRAM(s) Oral daily  apixaban 5 milliGRAM(s) Oral every 12 hours  artificial  tears Solution 1 Drop(s) Both EYES two times a day  carvedilol 6.25 milliGRAM(s) Oral every 12 hours  chlorhexidine 2% Cloths 1 Application(s) Topical <User Schedule>  insulin lispro (ADMELOG) corrective regimen sliding scale   SubCutaneous three times a day with meals  insulin lispro (ADMELOG) corrective regimen sliding scale   SubCutaneous at bedtime  pantoprazole    Tablet 40 milliGRAM(s) Oral before breakfast  predniSONE   Tablet   Oral   predniSONE   Tablet 10 milliGRAM(s) Oral daily  simvastatin 20 milliGRAM(s) Oral at bedtime    MEDICATIONS  (PRN):  guaiFENesin Oral Liquid (Sugar-Free) 200 milliGRAM(s) Oral every 6 hours PRN Cough      Allergies    losartan (Angioedema)    Intolerances    Chicken (Stomach Upset)      LABS:                        12.9   7.38  )-----------( 220      ( 11 Feb 2023 07:30 )             41.0     02-11    137  |  102  |  13  ----------------------------<  123<H>  4.4   |  30  |  0.66    Ca    8.8      11 Feb 2023 07:30                CAPILLARY BLOOD GLUCOSE      POCT Blood Glucose.: 135 mg/dL (12 Feb 2023 07:42)  POCT Blood Glucose.: 197 mg/dL (11 Feb 2023 21:13)  POCT Blood Glucose.: 200 mg/dL (11 Feb 2023 16:33)  POCT Blood Glucose.: 268 mg/dL (11 Feb 2023 11:29)        RADIOLOGY & ADDITIONAL TESTS:    CXR:    Ct scan chest:    ekg;    echo: Patient is a 89y old  Female who presents with a chief complaint of AHRF (12 Feb 2023 08:59)  Awake, alert,  clinically stable on oxygen supp via NC, in NAD    INTERVAL HPI/OVERNIGHT EVENTS:      VITAL SIGNS:  T(F): 97.1 (02-12-23 @ 05:00)  HR: 74 (02-12-23 @ 05:00)  BP: 136/93 (02-12-23 @ 05:00)  RR: 17 (02-12-23 @ 05:00)  SpO2: 100% (02-12-23 @ 05:00)  Wt(kg): --  I&O's Detail    11 Feb 2023 07:01  -  12 Feb 2023 07:00  --------------------------------------------------------  IN:  Total IN: 0 mL    OUT:    Voided (mL): 1500 mL  Total OUT: 1500 mL    Total NET: -1500 mL              REVIEW OF SYSTEMS:    CONSTITUTIONAL:  No fevers, chills, sweats    HEENT:  Eyes:  No diplopia or blurred vision. ENT:  No earache, sore throat or runny nose.    CARDIOVASCULAR:  No pressure, squeezing, tightness, or heaviness about the chest; no palpitations.    RESPIRATORY:  Per HPI    GASTROINTESTINAL:  No abdominal pain, nausea, vomiting or diarrhea.    GENITOURINARY:  No dysuria, frequency or urgency.    NEUROLOGIC:  No paresthesias, fasciculations, seizures or weakness.    PSYCHIATRIC:  No disorder of thought or mood.      PHYSICAL EXAM:    Constitutional: Well developed and nourished  Eyes:Perrla  ENMT: normal  Neck:supple  Respiratory: good air entry  Cardiovascular: S1 S2 regular  Gastrointestinal: Soft, Non tender  Extremities: No edema  Vascular:normal  Neurological:Awake, alert,Ox3  Musculoskeletal:Normal      MEDICATIONS  (STANDING):  albuterol/ipratropium for Nebulization 3 milliLiter(s) Nebulizer every 6 hours  anastrozole 1 milliGRAM(s) Oral daily  apixaban 5 milliGRAM(s) Oral every 12 hours  artificial  tears Solution 1 Drop(s) Both EYES two times a day  carvedilol 6.25 milliGRAM(s) Oral every 12 hours  chlorhexidine 2% Cloths 1 Application(s) Topical <User Schedule>  insulin lispro (ADMELOG) corrective regimen sliding scale   SubCutaneous three times a day with meals  insulin lispro (ADMELOG) corrective regimen sliding scale   SubCutaneous at bedtime  pantoprazole    Tablet 40 milliGRAM(s) Oral before breakfast  predniSONE   Tablet   Oral   predniSONE   Tablet 10 milliGRAM(s) Oral daily  simvastatin 20 milliGRAM(s) Oral at bedtime    MEDICATIONS  (PRN):  guaiFENesin Oral Liquid (Sugar-Free) 200 milliGRAM(s) Oral every 6 hours PRN Cough      Allergies    losartan (Angioedema)    Intolerances    Chicken (Stomach Upset)      LABS:                        12.9   7.38  )-----------( 220      ( 11 Feb 2023 07:30 )             41.0     02-11    137  |  102  |  13  ----------------------------<  123<H>  4.4   |  30  |  0.66    Ca    8.8      11 Feb 2023 07:30                CAPILLARY BLOOD GLUCOSE      POCT Blood Glucose.: 135 mg/dL (12 Feb 2023 07:42)  POCT Blood Glucose.: 197 mg/dL (11 Feb 2023 21:13)  POCT Blood Glucose.: 200 mg/dL (11 Feb 2023 16:33)  POCT Blood Glucose.: 268 mg/dL (11 Feb 2023 11:29)        RADIOLOGY & ADDITIONAL TESTS:    CXR:    Ct scan chest:    ekg;    echo:

## 2023-02-12 NOTE — PROGRESS NOTE ADULT - SUBJECTIVE AND OBJECTIVE BOX
CHIEF COMPLAINT:Patient is a 89y old  Female who presents with a chief complaint of AHRF.pt appears comfortable.    	  REVIEW OF SYSTEMS:  CONSTITUTIONAL: No fever, weight loss, or fatigue  EYES: No eye pain, visual disturbances, or discharge  ENT:  No difficulty hearing, tinnitus, vertigo; No sinus or throat pain  NECK: No pain or stiffness  RESPIRATORY: No cough, wheezing, chills or hemoptysis; No Shortness of Breath  CARDIOVASCULAR: No chest pain, palpitations, passing out, dizziness, or leg swelling  GASTROINTESTINAL: No abdominal or epigastric pain. No nausea, vomiting, or hematemesis; No diarrhea or constipation. No melena or hematochezia.  GENITOURINARY: No dysuria, frequency, hematuria, or incontinence  NEUROLOGICAL: No headaches, memory loss, loss of strength, numbness, or tremors  SKIN: No itching, burning, rashes, or lesions   LYMPH Nodes: No enlarged glands  ENDOCRINE: No heat or cold intolerance; No hair loss  MUSCULOSKELETAL: No joint pain or swelling; No muscle, back, or extremity pain  PSYCHIATRIC: No depression, anxiety, mood swings, or difficulty sleeping  HEME/LYMPH: No easy bruising, or bleeding gums  ALLERGY AND IMMUNOLOGIC: No hives or eczema	    PHYSICAL EXAM:  T(C): 36.7 (02-12-23 @ 12:17), Max: 36.8 (02-11-23 @ 20:53)  HR: 75 (02-12-23 @ 12:17) (69 - 88)  BP: 121/65 (02-12-23 @ 12:17) (121/65 - 148/88)  RR: 18 (02-12-23 @ 12:17) (17 - 19)  SpO2: 100% (02-12-23 @ 12:17) (100% - 100%)  Wt(kg): --  I&O's Summary    11 Feb 2023 07:01  -  12 Feb 2023 07:00  --------------------------------------------------------  IN: 0 mL / OUT: 1500 mL / NET: -1500 mL        Appearance: Normal	  HEENT:   Normal oral mucosa, PERRL, EOMI	  Lymphatic: No lymphadenopathy  Cardiovascular: Normal S1 S2, No JVD, No murmurs, No edema  Respiratory: Lungs clear to auscultation	  Psychiatry: A & O x 3, Mood & affect appropriate  Gastrointestinal:  Soft, Non-tender, + BS	  Skin: No rashes, No ecchymoses, No cyanosis	  Neurologic: Non-focal  Extremities: Normal range of motion, No clubbing, cyanosis or edema  Vascular: Peripheral pulses palpable 2+ bilaterally    MEDICATIONS  (STANDING):  albuterol/ipratropium for Nebulization 3 milliLiter(s) Nebulizer every 6 hours  anastrozole 1 milliGRAM(s) Oral daily  apixaban 5 milliGRAM(s) Oral every 12 hours  artificial  tears Solution 1 Drop(s) Both EYES two times a day  carvedilol 6.25 milliGRAM(s) Oral every 12 hours  chlorhexidine 2% Cloths 1 Application(s) Topical <User Schedule>  insulin lispro (ADMELOG) corrective regimen sliding scale   SubCutaneous three times a day with meals  insulin lispro (ADMELOG) corrective regimen sliding scale   SubCutaneous at bedtime  pantoprazole    Tablet 40 milliGRAM(s) Oral before breakfast  predniSONE   Tablet   Oral   predniSONE   Tablet 10 milliGRAM(s) Oral daily  simvastatin 20 milliGRAM(s) Oral at bedtime      LABS:	 	                        12.9   7.38  )-----------( 220      ( 11 Feb 2023 07:30 )             41.0     02-11    137  |  102  |  13  ----------------------------<  123<H>  4.4   |  30  |  0.66    Ca    8.8      11 Feb 2023 07:30      proBNP: Serum Pro-Brain Natriuretic Peptide: 3916 pg/mL (01-30 @ 20:20)    Lipid Profile:   HgA1c:   TSH:

## 2023-02-12 NOTE — DISCHARGE NOTE PROVIDER - [email or web address]_GEN_ALL_CORE
Teaching and training Medication teaching and assessment/Observation and assessment/Rehabilitation services/Teaching and training

## 2023-02-12 NOTE — DISCHARGE NOTE PROVIDER - NSDCCPCAREPLAN_GEN_ALL_CORE_FT
PRINCIPAL DISCHARGE DIAGNOSIS  Diagnosis: COPD exacerbation  Assessment and Plan of Treatment:       SECONDARY DISCHARGE DIAGNOSES  Diagnosis: HTN (hypertension)  Assessment and Plan of Treatment:     Diagnosis: Breast cancer  Assessment and Plan of Treatment:     Diagnosis: Atrial fibrillation  Assessment and Plan of Treatment:      PRINCIPAL DISCHARGE DIAGNOSIS  Diagnosis: Acute on chronic respiratory failure with hypoxia and hypercapnia  Assessment and Plan of Treatment: When you were admitted to the hospital, your oxygen level was low and your CO2 level was very high. You were initially asmitted to the ICU and was eventually downgraded to the Special Care Unit. You was initially placed kvng BIPAP machine however you were later switched to AVAPS. In order to discharge you safely you will need a machine called a Trilogy at home. A respiratory therapist will meet you at your house on the day of discharge and set up your machine. The therapist will show your family how to use your machine. They also will follow you in the community. You are to use the machine at night and any time you feel short of breath during the day. Continue using your oxygen as prescribed.      SECONDARY DISCHARGE DIAGNOSES  Diagnosis: COPD exacerbation  Assessment and Plan of Treatment: Continue using your Trilogy machine at night and as needed during the day. Use oxygen as prescribed. Continue using inhalers as prescribed. Ronse your mouth after using your inhalers. Follow up with your doctor within 3-5 days.    Diagnosis: Acute on chronic congestive heart failure  Assessment and Plan of Treatment: Your lasix is currently being held because your creatinine was elevated. Have your primary doctor monitor your creatinine level in the community. He will decide when to restart your diuretics.    Diagnosis: HTN (hypertension)  Assessment and Plan of Treatment: Continue medications as prescribed. Your primary doctor will decide when to restart your diuretic. Follow a low sodium diet at home.    Diagnosis: Atrial fibrillation  Assessment and Plan of Treatment: Continue taking medications as prescibed. Report any rapid heart rates or palpitations immediately. It is extremely important to continue taking Eliquis as prescribed to help prevent stroke.    Diagnosis: Acute metabolic encephalopathy  Assessment and Plan of Treatment: This was caused by an elevated CO2 level on admission. It armstrong resolved with AVAPS usage. Use your Trilogy machine nightly to help prevent this from happening again.    Diagnosis: Breast cancer  Assessment and Plan of Treatment: Continue medication as prescribed.

## 2023-02-12 NOTE — DISCHARGE NOTE PROVIDER - PROVIDER TOKENS
PROVIDER:[TOKEN:[[id number]]] PROVIDER:[TOKEN:[[id number]],FOLLOWUP:[1 week]],PROVIDER:[TOKEN:[[id number]],FOLLOWUP:[1 week]]

## 2023-02-12 NOTE — PROGRESS NOTE ADULT - ASSESSMENT
89 yo F, from home, with PMHx HTN, COPD, Afib, HFpEF (7/29/22 EF 55-60%, LVH, GIDD, Breast CA, and remote h/o LLE DVT p/w SOB and hypoxia to high 70s, admitted to medicine for AHRF 2/2 Acute diastolic CHF +/- COPD exacerbation and UTI (completed treatment). ICU consulted for concerns of AMS in the setting of Acute on chronic compensated respiratory acidosis requiring new trial of Bipap.  Patient downgraded to AI for further management. Currently on 2L NC. Will need BiPAP Q HS.   2/5 Change to AVAPS setting qHS.   02/08: patient medically optimized for discharge. Plan to d/c to home with AVAPS. On 2L NC and tolerating well. Awaiting for AVAPS machine. CM following.   02/09: Patient optimized medically and plan to home with family. Awaiting AVAPS.     2/11 Pt seen at bedside, NAD, no new complaints, saturation 98 % on 2 L.  Still pending authorization  for trilogy DME,  no discharge until then.

## 2023-02-12 NOTE — DISCHARGE NOTE PROVIDER - CARE PROVIDER_API CALL
Rito Son)  Internal Medicine; Pulmonary Disease  [address] [place] [address]  Phone: [phone]  Fax: [phone]  Follow Up Time:    Rito Son)  Internal Medicine; Pulmonary Disease  [address] [place] [address]  Phone: [phone]  Fax: [phone]  Follow Up Time: 1 week    Ayden Bianchi)  Medicine  [address] [place] [address], [place] [address]  Phone: [phone]  Fax: [phone]  Follow Up Time: 1 week

## 2023-02-12 NOTE — DISCHARGE NOTE PROVIDER - HOSPITAL COURSE
89 yo F, from home, with PMHx HTN, COPD, Afib, HFpEF (7/29/22 EF 55-60%, LVH, GIDD, Breast CA, and remote h/o LLE DVT   p/w SOB and hypoxia to high 70s, admitted to medicine for AHRF 2/2 Acute diastolic CHF +/- COPD exacerbation and UTI (completed treatment). ICU consulted for concerns of AMS in the setting of Acute on chronic compensated respiratory acidosis requiring new trial of Bipap.  Patient downgraded to AI for further management. Pt on supplemental oxygen 2 L NC, requires BiPAP Q HS.   Plan to d/c to home with AVAPS.     INCOMPLETE Patient downgraded to AI for further management. Pt on supplemental oxygen 2 L NC, requires BiPAP Q HS.   89 yo F, from home, with PMHx HTN, COPD, Afib, HFpEF (7/29/22 EF 55-60%, LVH, GIDD, Breast CA, and remote h/o LLE DVT p/w SOB and hypoxia to high 70s, admitted to medicine for AHRF 2/2 Acute diastolic CHF +/- COPD exacerbation and UTI (completed treatment). ICU consulted for concerns of AMS in the setting of Acute on chronic compensated respiratory acidosis requiring new trial of Bipap.  Patient downgraded to AI for further management. Currently on 2L NC. Will need BiPAP Q HS.   2/5 Change to AVAPS setting qHS.   02/08: patient medically optimized for discharge. Plan to d/c to home with AVAPS. On 2L NC and tolerating well. Awaiting for AVAPS machine. CM following.   02/09: Patient optimized medically and plan to home with family. Awaiting AVAPS. Has home oxygen  2/14  Trilogy approved. Will be set up and delivered tomorrow.  LABS:  CBC Full  -  ( 13 Feb 2023 07:08 )  WBC Count : 8.14 K/uL  RBC Count : 4.20 M/uL  Hemoglobin : 13.0 g/dL  Hematocrit : 42.1 %  Platelet Count - Automated : 224 K/uL  Mean Cell Volume : 100.2 fl  Mean Cell Hemoglobin : 31.0 pg  Mean Cell Hemoglobin Concentration : 30.9 gm/dL  Auto Neutrophil # : x  Auto Lymphocyte # : x  Auto Monocyte # : x  Auto Eosinophil # : x  Auto Basophil # : x  Auto Neutrophil % : x  Auto Lymphocyte % : x  Auto Monocyte % : x  Auto Eosinophil % : x  Auto Basophil % : x    02-13    141  |  102  |  10  ----------------------------<  155<H>  4.2   |  34<H>  |  0.73    Ca    9.0      13 Feb 2023 07:08 Patient downgraded to AI for further management. Pt on supplemental oxygen 2 L NC, requires BiPAP Q HS.   89 yo F, from home, with PMHx HTN, COPD, Afib, HFpEF (7/29/22 EF 55-60%, LVH, GIDD, Breast CA, and remote h/o LLE DVT p/w SOB and hypoxia to high 70s, admitted to medicine for AHRF 2/2 Acute diastolic CHF +/- COPD exacerbation and UTI (completed treatment). ICU consulted for concerns of AMS in the setting of Acute on chronic compensated respiratory acidosis requiring new trial of Bipap.  Patient downgraded to AI for further management. Currently on 2L NC. Will need BiPAP Q HS.   2/5 Change to AVAPS setting qHS.   02/08: patient medically optimized for discharge. Plan to d/c to home with AVAPS. On 2L NC and tolerating well. Awaiting for AVAPS machine. CM following.   02/09: Patient optimized medically and plan to home with family. Awaiting AVAPS. Has home oxygen  2/14  Trilogy approved. Authorization pending  LABS:  CBC Full  -  ( 17 Feb 2023 07:50 )  WBC Count : 6.01 K/uL  RBC Count : 4.23 M/uL  Hemoglobin : 13.0 g/dL  Hematocrit : 42.6 %  Platelet Count - Automated : 203 K/uL  Mean Cell Volume : 100.7 fl  Mean Cell Hemoglobin : 30.7 pg  Mean Cell Hemoglobin Concentration : 30.5 gm/dL  Auto Neutrophil # : x  Auto Lymphocyte # : x  Auto Monocyte # : x  Auto Eosinophil # : x  Auto Basophil # : x  Auto Neutrophil % : x  Auto Lymphocyte % : x  Auto Monocyte % : x  Auto Eosinophil % : x  Auto Basophil % : x    02-17    143  |  104  |  7   ----------------------------<  133<H>  4.2   |  34<H>  |  0.63    Ca    8.7      17 Feb 2023 07:50

## 2023-02-12 NOTE — DISCHARGE NOTE PROVIDER - NSDCMRMEDTOKEN_GEN_ALL_CORE_FT
albuterol 90 mcg/inh inhalation aerosol: 1 puff(s) inhaled every 6 hours, As Needed  ALBUTEROL SULFATE HFA  108 (90 Base) MCG/ACT AERS:   anastrozole 1 mg oral tablet: 1 tab(s) orally once a day  apixaban 5 mg oral tablet: 1 tab(s) orally every 12 hours  aspirin 81 mg oral tablet, chewable: 1 tab(s) orally once a day  carvedilol 6.25 mg oral tablet: 1 tab(s) orally every 12 hours  FeroSul 325 mg (65 mg elemental iron) oral tablet: 1 tab(s) orally once a day  furosemide 40 mg oral tablet: 1 tab(s) orally 2 times a day  Lidocare Pain Relief Patch 4% topical film: Apply topically to affected area once a day   montelukast 10 mg oral tablet: 1 tab(s) orally once a day  ocular lubricant ophthalmic solution: 1 drop(s) to each affected eye 2 times a day  simvastatin 20 mg oral tablet: 1 tab(s) orally once a day (at bedtime)  TOBRAMYCIN/DEXAMETHASONE  0.3-0.1 % SUSP:   TRELEGY ELLIPTA  100-62.5-25 MCG/ACT AEPB:   Vitamin D3 125 mcg (5000 intl units) oral capsule: 1 cap(s) orally once a day   albuterol 90 mcg/inh inhalation aerosol: 1 puff(s) inhaled every 6 hours, As Needed  ALBUTEROL SULFATE HFA  108 (90 Base) MCG/ACT AERS:   anastrozole 1 mg oral tablet: 1 tab(s) orally once a day  apixaban 5 mg oral tablet: 1 tab(s) orally every 12 hours  aspirin 81 mg oral tablet, chewable: 1 tab(s) orally once a day  carvedilol 6.25 mg oral tablet: 1 tab(s) orally every 12 hours  FeroSul 325 mg (65 mg elemental iron) oral tablet: 1 tab(s) orally once a day  Lidocare Pain Relief Patch 4% topical film: Apply topically to affected area once a day   montelukast 10 mg oral tablet: 1 tab(s) orally once a day  ocular lubricant ophthalmic solution: 1 drop(s) to each affected eye 2 times a day  simvastatin 20 mg oral tablet: 1 tab(s) orally once a day (at bedtime)  TRELEGY ELLIPTA  100-62.5-25 MCG/ACT AEPB:   Vitamin D3 125 mcg (5000 intl units) oral capsule: 1 cap(s) orally once a day

## 2023-02-13 LAB
ANION GAP SERPL CALC-SCNC: 5 MMOL/L — SIGNIFICANT CHANGE UP (ref 5–17)
BUN SERPL-MCNC: 10 MG/DL — SIGNIFICANT CHANGE UP (ref 7–18)
CALCIUM SERPL-MCNC: 9 MG/DL — SIGNIFICANT CHANGE UP (ref 8.4–10.5)
CHLORIDE SERPL-SCNC: 102 MMOL/L — SIGNIFICANT CHANGE UP (ref 96–108)
CO2 SERPL-SCNC: 34 MMOL/L — HIGH (ref 22–31)
CREAT SERPL-MCNC: 0.73 MG/DL — SIGNIFICANT CHANGE UP (ref 0.5–1.3)
EGFR: 78 ML/MIN/1.73M2 — SIGNIFICANT CHANGE UP
GLUCOSE BLDC GLUCOMTR-MCNC: 147 MG/DL — HIGH (ref 70–99)
GLUCOSE BLDC GLUCOMTR-MCNC: 166 MG/DL — HIGH (ref 70–99)
GLUCOSE BLDC GLUCOMTR-MCNC: 185 MG/DL — HIGH (ref 70–99)
GLUCOSE BLDC GLUCOMTR-MCNC: 213 MG/DL — HIGH (ref 70–99)
GLUCOSE SERPL-MCNC: 155 MG/DL — HIGH (ref 70–99)
HCT VFR BLD CALC: 42.1 % — SIGNIFICANT CHANGE UP (ref 34.5–45)
HGB BLD-MCNC: 13 G/DL — SIGNIFICANT CHANGE UP (ref 11.5–15.5)
MCHC RBC-ENTMCNC: 30.9 GM/DL — LOW (ref 32–36)
MCHC RBC-ENTMCNC: 31 PG — SIGNIFICANT CHANGE UP (ref 27–34)
MCV RBC AUTO: 100.2 FL — HIGH (ref 80–100)
NRBC # BLD: 0 /100 WBCS — SIGNIFICANT CHANGE UP (ref 0–0)
PLATELET # BLD AUTO: 224 K/UL — SIGNIFICANT CHANGE UP (ref 150–400)
POTASSIUM SERPL-MCNC: 4.2 MMOL/L — SIGNIFICANT CHANGE UP (ref 3.5–5.3)
POTASSIUM SERPL-SCNC: 4.2 MMOL/L — SIGNIFICANT CHANGE UP (ref 3.5–5.3)
RBC # BLD: 4.2 M/UL — SIGNIFICANT CHANGE UP (ref 3.8–5.2)
RBC # FLD: 13.4 % — SIGNIFICANT CHANGE UP (ref 10.3–14.5)
SODIUM SERPL-SCNC: 141 MMOL/L — SIGNIFICANT CHANGE UP (ref 135–145)
WBC # BLD: 8.14 K/UL — SIGNIFICANT CHANGE UP (ref 3.8–10.5)
WBC # FLD AUTO: 8.14 K/UL — SIGNIFICANT CHANGE UP (ref 3.8–10.5)

## 2023-02-13 PROCEDURE — 99232 SBSQ HOSP IP/OBS MODERATE 35: CPT

## 2023-02-13 RX ADMIN — Medication 1 DROP(S): at 06:17

## 2023-02-13 RX ADMIN — Medication 3 MILLILITER(S): at 08:15

## 2023-02-13 RX ADMIN — Medication 2: at 11:45

## 2023-02-13 RX ADMIN — PANTOPRAZOLE SODIUM 40 MILLIGRAM(S): 20 TABLET, DELAYED RELEASE ORAL at 06:16

## 2023-02-13 RX ADMIN — Medication 1 DROP(S): at 17:23

## 2023-02-13 RX ADMIN — APIXABAN 5 MILLIGRAM(S): 2.5 TABLET, FILM COATED ORAL at 17:23

## 2023-02-13 RX ADMIN — Medication 1: at 16:59

## 2023-02-13 RX ADMIN — Medication 3 MILLILITER(S): at 03:53

## 2023-02-13 RX ADMIN — Medication 3 MILLILITER(S): at 15:39

## 2023-02-13 RX ADMIN — CARVEDILOL PHOSPHATE 6.25 MILLIGRAM(S): 80 CAPSULE, EXTENDED RELEASE ORAL at 06:16

## 2023-02-13 RX ADMIN — Medication 10 MILLIGRAM(S): at 06:18

## 2023-02-13 RX ADMIN — CARVEDILOL PHOSPHATE 6.25 MILLIGRAM(S): 80 CAPSULE, EXTENDED RELEASE ORAL at 17:23

## 2023-02-13 RX ADMIN — ANASTROZOLE 1 MILLIGRAM(S): 1 TABLET ORAL at 11:46

## 2023-02-13 RX ADMIN — APIXABAN 5 MILLIGRAM(S): 2.5 TABLET, FILM COATED ORAL at 06:18

## 2023-02-13 RX ADMIN — SIMVASTATIN 20 MILLIGRAM(S): 20 TABLET, FILM COATED ORAL at 22:03

## 2023-02-13 RX ADMIN — Medication 3 MILLILITER(S): at 20:16

## 2023-02-13 RX ADMIN — CHLORHEXIDINE GLUCONATE 1 APPLICATION(S): 213 SOLUTION TOPICAL at 06:17

## 2023-02-13 NOTE — PROGRESS NOTE ADULT - PROBLEM SELECTOR PLAN 3
Exacerbation resolving.  Continue AVAPS nightly and as needed during the day. Oxygen support when not on AVAPS  Remains hypercapnic despite AVAPS usage. PCO2 on AVAPS 57-75. PCO2 on BIPAP 85. Failed BIAP and AVAPS therapy. Patient will need AVAPS-AE/Trilogy at home to manage persistent hypercapnia. Without AVAPS-AE/Trilogy patient is a high risk for readmission and intubation.   Steroids completed.  Continue bronchodilators.

## 2023-02-13 NOTE — PROGRESS NOTE ADULT - PROBLEM SELECTOR PLAN 1
Acute on chronic respiratory acidosis, COPD  and  Acute diastolic heart failure.   Acute resolved.  Completed steroids.  Continue bronchodilators.  Continue AVAPS nightly and as needed during the day. AVAPS-AE ordered 2/7/23 for home use  Remains hypercapnic on AVAPS PCO2 57. PCO2 on admission on BIPAP 84.  Failed BIPAP and AVAPS therapy.  Will need Trilogy/AVAPS-AE upon discharge. Without AVAPS-AE patient is a high high for readmission and intubation secondary to persistent hypercapnia. Second admission for respiratory distress since October 2022. Patient also has spinal deformities which is contributing to her hypercapnia.

## 2023-02-13 NOTE — PROGRESS NOTE ADULT - PROBLEM SELECTOR PLAN 9
DVT and GI prophylaxis.  Failed BIPAP and AVAPS therapy. Remained hypercapnic on both. PCO2 57-84. Will need Trilogy/AVAPS-AE at home to adequately manage persistent hypercapnia. Without Trilogy/AVAPS-AE patient will remain hypercapnic increasing the possibility of readmission and intubation.  Continue bronchodilators. Steroids completed.  Has home oxygen.  Trilogy ordered 2/07/23.  Medically stable for discharge once Trilogy is delivered.

## 2023-02-13 NOTE — PROGRESS NOTE ADULT - SUBJECTIVE AND OBJECTIVE BOX
CHIEF COMPLAINT:Patient is a 89y old  Female who presents with a chief complaint of AHRF.Pt appears comfortable.    	  REVIEW OF SYSTEMS:  CONSTITUTIONAL: No fever, weight loss, or fatigue  EYES: No eye pain, visual disturbances, or discharge  ENT:  No difficulty hearing, tinnitus, vertigo; No sinus or throat pain  NECK: No pain or stiffness  RESPIRATORY: No cough, wheezing, chills or hemoptysis; No Shortness of Breath  CARDIOVASCULAR: No chest pain, palpitations, passing out, dizziness, or leg swelling  GASTROINTESTINAL: No abdominal or epigastric pain. No nausea, vomiting, or hematemesis; No diarrhea or constipation. No melena or hematochezia.  GENITOURINARY: No dysuria, frequency, hematuria, or incontinence  NEUROLOGICAL: No headaches, memory loss, loss of strength, numbness, or tremors  SKIN: No itching, burning, rashes, or lesions   LYMPH Nodes: No enlarged glands  ENDOCRINE: No heat or cold intolerance; No hair loss  MUSCULOSKELETAL: No joint pain or swelling; No muscle, back, or extremity pain  PSYCHIATRIC: No depression, anxiety, mood swings, or difficulty sleeping  HEME/LYMPH: No easy bruising, or bleeding gums  ALLERGY AND IMMUNOLOGIC: No hives or eczema	        PHYSICAL EXAM:  T(C): 37.4 (02-13-23 @ 05:10), Max: 37.4 (02-13-23 @ 05:10)  HR: 72 (02-13-23 @ 05:10) (70 - 91)  BP: 137/70 (02-13-23 @ 05:10) (121/65 - 150/65)  RR: 17 (02-13-23 @ 05:10) (17 - 18)  SpO2: 97% (02-13-23 @ 05:10) (97% - 100%)  Wt(kg): --  I&O's Summary      Appearance: Normal	  HEENT:   Normal oral mucosa, PERRL, EOMI	  Lymphatic: No lymphadenopathy  Cardiovascular: Normal S1 S2, No JVD, No murmurs, No edema  Respiratory: Lungs clear to auscultation	  Psychiatry: A & O x 3, Mood & affect appropriate  Gastrointestinal:  Soft, Non-tender, + BS	  Skin: No rashes, No ecchymoses, No cyanosis	  Neurologic: Non-focal  Extremities: Normal range of motion, No clubbing, cyanosis or edema  Vascular: Peripheral pulses palpable 2+ bilaterally    MEDICATIONS  (STANDING):  albuterol/ipratropium for Nebulization 3 milliLiter(s) Nebulizer every 6 hours  anastrozole 1 milliGRAM(s) Oral daily  apixaban 5 milliGRAM(s) Oral every 12 hours  artificial  tears Solution 1 Drop(s) Both EYES two times a day  carvedilol 6.25 milliGRAM(s) Oral every 12 hours  chlorhexidine 2% Cloths 1 Application(s) Topical <User Schedule>  insulin lispro (ADMELOG) corrective regimen sliding scale   SubCutaneous three times a day with meals  insulin lispro (ADMELOG) corrective regimen sliding scale   SubCutaneous at bedtime  pantoprazole    Tablet 40 milliGRAM(s) Oral before breakfast  simvastatin 20 milliGRAM(s) Oral at bedtime        LABS:	 	                        13.0   8.14  )-----------( 224      ( 13 Feb 2023 07:08 )             42.1     02-13    141  |  102  |  10  ----------------------------<  155<H>  4.2   |  34<H>  |  0.73    Ca    9.0      13 Feb 2023 07:08      proBNP: Serum Pro-Brain Natriuretic Peptide: 3916 pg/mL (01-30 @ 20:20)

## 2023-02-13 NOTE — PROGRESS NOTE ADULT - PROBLEM SELECTOR PLAN 2
Acute resolving. Diastolic heart failure with mild pulmonary hypertension.  Continue coreg BID.  ECHO July 2022- EF 55-60%, LVH, GIDD, mild pulm HTN  pro-BNP 3916 (prev 1943)  S/p diuresis in ICU. Completed diuretics  Daily weight   Dr Siu following.

## 2023-02-13 NOTE — H&P ADULT - NSHPPHYSICALEXAM_GEN_ALL_CORE
8/20/2013: Extraperitoneal vaginal vault suspension, cystocele repair, midurethral sling using [employer] TOT, cystoscopy and excision of rectal skin tags-Dr. Kearney    No answer/busy signal.  Will attempt to reach pt again later.   Vital Signs Last 24 Hrs  T(C): 36.1 (13 Apr 2020 21:45), Max: 36.8 (13 Apr 2020 15:23)  T(F): 96.9 (13 Apr 2020 21:45), Max: 98.3 (13 Apr 2020 15:23)  HR: 87 (13 Apr 2020 21:45) (72 - 145)  BP: 127/91 (13 Apr 2020 21:45) (114/90 - 139/94)  RR: 23 (13 Apr 2020 21:45) (18 - 25)  SpO2: 98% (13 Apr 2020 21:45) (96% - 98%)    · Physical Examination: GENERAL: well appearing, no acute distress   	HEAD: atraumatic   	EYES: absent vision   	ENT: moist oral mucosa   	CARDIAC: RRR, no edema, distal pulses present   	RESPIRATORY: lungs CTAB, no increased work of breathing   	GASTROINTESTINAL: no abdominal tenderness, no rebound or guarding, bowel sounds presents  	GENITOURINARY: no CVA tenderness   	MUSCULOSKELETAL: no deformity   	NEUROLOGICAL: AAOx3, CN's II-XII intact except CN II vision, strength 5/5 bilateral UE and LE, sensation intact to light touch  	SKIN: intact   	PSYCHIATRIC: cooperative  HEME LYMPH: no lymphadenopathy

## 2023-02-13 NOTE — PROGRESS NOTE ADULT - SUBJECTIVE AND OBJECTIVE BOX
NUNO LAST    SCU progress note    INTERVAL HPI/OVERNIGHT EVENTS: ***No overnight events.    DNR [ ]   DNI  [  ]  FULL CODE    Covid - 19 PCR: Negative 2/3    The 4Ms    What Matters Most: see GOC  Age appropriate Medications/Screen for High Risk Medication: Yes  Mentation: see CAM below  Mobility: defer to physical exam    The Confusion Assessment Method (CAM) Diagnostic Algorithm     1: Acute Onset or Fluctuating Course  - Is there evidence of an acute change in mental status from the patient’s baseline? Did the (abnormal) behavior  fluctuate during the day, that is, tend to come and go, or increase and decrease in severity?  [ ] YES [x ] NO     2: Inattention  - Did the patient have difficulty focusing attention, being easily distractible, or having difficulty keeping track of what was being said?  [ ] YES [x ] NO     3: Disorganized thinking  -Was the patient’s thinking disorganized or incoherent, such as rambling or irrelevant conversation, unclear or illogical flow of ideas, or unpredictable switching from subject to subject?  [ ] YES [x ] NO    4: Altered Level of consciousness?  [ ] YES [x ] NO    The diagnosis of delirium by CAM requires the presence of features 1 and 2 and either 3 or 4.    PRESSORS: [ ] YES [x ] NO    Cardiovascular:  Heart Failure  Acute   Acute on Chronic  Chronic       carvedilol 6.25 milliGRAM(s) Oral every 12 hours    Pulmonary:  albuterol/ipratropium for Nebulization 3 milliLiter(s) Nebulizer every 6 hours  guaiFENesin Oral Liquid (Sugar-Free) 200 milliGRAM(s) Oral every 6 hours PRN    Hematalogic:  apixaban 5 milliGRAM(s) Oral every 12 hours    Other:  anastrozole 1 milliGRAM(s) Oral daily  artificial  tears Solution 1 Drop(s) Both EYES two times a day  chlorhexidine 2% Cloths 1 Application(s) Topical <User Schedule>  insulin lispro (ADMELOG) corrective regimen sliding scale   SubCutaneous three times a day with meals  insulin lispro (ADMELOG) corrective regimen sliding scale   SubCutaneous at bedtime  pantoprazole    Tablet 40 milliGRAM(s) Oral before breakfast  simvastatin 20 milliGRAM(s) Oral at bedtime    albuterol/ipratropium for Nebulization 3 milliLiter(s) Nebulizer every 6 hours  anastrozole 1 milliGRAM(s) Oral daily  apixaban 5 milliGRAM(s) Oral every 12 hours  artificial  tears Solution 1 Drop(s) Both EYES two times a day  carvedilol 6.25 milliGRAM(s) Oral every 12 hours  chlorhexidine 2% Cloths 1 Application(s) Topical <User Schedule>  guaiFENesin Oral Liquid (Sugar-Free) 200 milliGRAM(s) Oral every 6 hours PRN  insulin lispro (ADMELOG) corrective regimen sliding scale   SubCutaneous three times a day with meals  insulin lispro (ADMELOG) corrective regimen sliding scale   SubCutaneous at bedtime  pantoprazole    Tablet 40 milliGRAM(s) Oral before breakfast  simvastatin 20 milliGRAM(s) Oral at bedtime    Drug Dosing Weight  Height (cm): 154.9 (30 Jan 2023 16:23)  Weight (kg): 120 (03 Feb 2023 15:15)  BMI (kg/m2): 50 (03 Feb 2023 15:15)  BSA (m2): 2.13 (03 Feb 2023 15:15)    CENTRAL LINE: [ ] YES [x ] NO  LOCATION:   DATE INSERTED:  REMOVE: [ ] YES [ ] NO  EXPLAIN:    AGUIRRE: [ ] YES [x ] NO    DATE INSERTED:  REMOVE:  [ ] YES [ ] NO  EXPLAIN:    PAST MEDICAL & SURGICAL HISTORY:  Arthritis      Legally blind      Pre-diabetes      Breast cancer  right, no chemo or radiation      COPD exacerbation      Atrial fibrillation      HF (heart failure)  HFpEF 7/29      HTN (hypertension)      Deep vein thrombosis (DVT)  Left Lower Extremity      H/O CHF      No significant past surgical history                        PHYSICAL EXAM:    GENERAL: NAD, well-groomed, well-developed  HEAD:  Atraumatic, Normocephalic  EYES: Conjunctiva and sclera clear  ENMT: No tonsillar erythema, exudates, or enlargement; Moist mucous membranes  NECK: Supple, No JVD  NERVOUS SYSTEM:  Alert & Oriented X3, Follows commands, moving all extremities.  CHEST/LUNG: Clear to percussion bilaterally; No rales, rhonchi, wheezing, or rubs  HEART: Regular rate and rhythm; No murmurs, rubs, or gallops  ABDOMEN: Soft, Nontender, Nondistended; Bowel sounds present  EXTREMITIES:  2+ Peripheral Pulses, No clubbing, cyanosis, or edema  LYMPH: No lymphadenopathy noted  SKIN: No rashes or lesions      LABS:  CBC Full  -  ( 13 Feb 2023 07:08 )  WBC Count : 8.14 K/uL  RBC Count : 4.20 M/uL  Hemoglobin : 13.0 g/dL  Hematocrit : 42.1 %  Platelet Count - Automated : 224 K/uL  Mean Cell Volume : 100.2 fl  Mean Cell Hemoglobin : 31.0 pg  Mean Cell Hemoglobin Concentration : 30.9 gm/dL  Auto Neutrophil # : x  Auto Lymphocyte # : x  Auto Monocyte # : x  Auto Eosinophil # : x  Auto Basophil # : x  Auto Neutrophil % : x  Auto Lymphocyte % : x  Auto Monocyte % : x  Auto Eosinophil % : x  Auto Basophil % : x    02-13    141  |  102  |  10  ----------------------------<  155<H>  4.2   |  34<H>  |  0.73    Ca    9.0      13 Feb 2023 07:08                [  ]  DVT Prophylaxis  [  ]  Nutrition, Brand, Rate         Goal Rate        Abnormal Nutritional Status -  Malnutrition   Cachexia          RADIOLOGY & ADDITIONAL STUDIES:  ***  < from: CT Chest No Cont (01.31.23 @ 16:36) >  EXAMINATION: CT CHEST was performed without IV contrast.    COMPARISON: 7/28/2022.    FINDINGS:    Image quality degraded due to extensive respiratory motion.    AIRWAYS, LUNGS, PLEURA: Trachea and mainstem bronchi patent. Biapical   scarring unchanged. Bibasilar atelectasis. No focal lung consolidation.   No pleural effusion.    MEDIASTINUM: Cardiomegaly. No pericardial effusion. Thoracic aorta normal   caliber.  No large mediastinal lymph nodes.    IMAGED ABDOMEN: Unremarkable.    SOFT TISSUES: Unremarkable.    BONES: Unremarkable.      IMPRESSION:.    No focal lung consolidation.    Bibasilar atelectasis.      < end of copied text >    Goals of Care Discussion with Family/Proxy/Other   - see note from 2/06/23

## 2023-02-13 NOTE — PROGRESS NOTE ADULT - SUBJECTIVE AND OBJECTIVE BOX
Patient was seen and examined  Patient is a 89y old  Female who presents with a chief complaint of AHRF (12 Feb 2023 12:48)      INTERVAL HPI/OVERNIGHT EVENTS:  T(C): 37.4 (02-13-23 @ 05:10), Max: 37.4 (02-13-23 @ 05:10)  HR: 72 (02-13-23 @ 05:10) (70 - 91)  BP: 137/70 (02-13-23 @ 05:10) (121/65 - 150/65)  RR: 17 (02-13-23 @ 05:10) (17 - 18)  SpO2: 97% (02-13-23 @ 05:10) (97% - 100%)  Wt(kg): --  I&O's Summary      LABS:                        13.0   8.14  )-----------( 224      ( 13 Feb 2023 07:08 )             42.1     02-13    141  |  102  |  10  ----------------------------<  155<H>  4.2   |  34<H>  |  0.73    Ca    9.0      13 Feb 2023 07:08          CAPILLARY BLOOD GLUCOSE      POCT Blood Glucose.: 147 mg/dL (13 Feb 2023 07:44)  POCT Blood Glucose.: 131 mg/dL (12 Feb 2023 21:41)  POCT Blood Glucose.: 174 mg/dL (12 Feb 2023 16:46)  POCT Blood Glucose.: 264 mg/dL (12 Feb 2023 11:08)              MEDICATIONS  (STANDING):  albuterol/ipratropium for Nebulization 3 milliLiter(s) Nebulizer every 6 hours  anastrozole 1 milliGRAM(s) Oral daily  apixaban 5 milliGRAM(s) Oral every 12 hours  artificial  tears Solution 1 Drop(s) Both EYES two times a day  carvedilol 6.25 milliGRAM(s) Oral every 12 hours  chlorhexidine 2% Cloths 1 Application(s) Topical <User Schedule>  insulin lispro (ADMELOG) corrective regimen sliding scale   SubCutaneous three times a day with meals  insulin lispro (ADMELOG) corrective regimen sliding scale   SubCutaneous at bedtime  pantoprazole    Tablet 40 milliGRAM(s) Oral before breakfast  simvastatin 20 milliGRAM(s) Oral at bedtime    MEDICATIONS  (PRN):  guaiFENesin Oral Liquid (Sugar-Free) 200 milliGRAM(s) Oral every 6 hours PRN Cough      RADIOLOGY & ADDITIONAL TESTS:    Imaging Personally Reviewed:  [ ] YES  [ ] NO    REVIEW OF SYSTEMS:  CONSTITUTIONAL: No fever, weight loss, or fatigue  EYES: No eye pain, visual disturbances, or discharge  ENMT:  No difficulty hearing, tinnitus, vertigo; No sinus or throat pain  NECK: No pain or stiffness  BREASTS: No pain, masses, or nipple discharge  RESPIRATORY: No cough, wheezing, chills or hemoptysis; No shortness of breath  CARDIOVASCULAR: No chest pain, palpitations, dizziness, or leg swelling  GASTROINTESTINAL: No abdominal or epigastric pain. No nausea, vomiting, or hematemesis; No diarrhea or constipation. No melena or hematochezia.  GENITOURINARY: No dysuria, frequency, hematuria, or incontinence  NEUROLOGICAL: No headaches, memory loss, loss of strength, numbness, or tremors  SKIN: No itching, burning, rashes, or lesions   LYMPH NODES: No enlarged glands  ENDOCRINE: No heat or cold intolerance; No hair loss  MUSCULOSKELETAL: No joint pain or swelling; No muscle, back, or extremity pain  PSYCHIATRIC: No depression, anxiety, mood swings, or difficulty sleeping  HEME/LYMPH: No easy bruising, or bleeding gums  ALLERY AND IMMUNOLOGIC: No hives or eczema      Consultant(s) Notes Reviewed:  [ x ] YES  [ ] NO    PHYSICAL EXAM:  GENERAL: NAD, well-groomed, well-developed  HEAD:  Atraumatic, Normocephalic  EYES: blind   ENMT: No tonsillar erythema, exudates, or enlargement; Moist mucous membranes, Good dentition, No lesions  NECK: Supple, No JVD, Normal thyroid  NERVOUS SYSTEM:  Alert & Oriented X3, Good concentration; Motor Strength 5/5 B/L upper and lower extremities; DTRs 2+ intact and symmetric  CHEST/LUNG: Clear to percussion bilaterally; No rales, rhonchi, wheezing, or rubs  HEART: Regular rate and rhythm; No murmurs, rubs, or gallops  ABDOMEN: Soft, Nontender, Nondistended; Bowel sounds present  EXTREMITIES:  2+ Peripheral Pulses, No clubbing, cyanosis, or edema  LYMPH: No lymphadenopathy noted  SKIN: No rashes or lesions    Care Discussed with Consultants/Other Providers [ x] YES  [ ] NO

## 2023-02-13 NOTE — PROGRESS NOTE ADULT - SUBJECTIVE AND OBJECTIVE BOX
Patient is a 89y old  Female who presents with a chief complaint of AHRF (13 Feb 2023 09:15)  Awake, alert, lying in bed comfortably on O2 supp via NC in NAD    INTERVAL HPI/OVERNIGHT EVENTS:      VITAL SIGNS:  T(F): 99.3 (02-13-23 @ 05:10)  HR: 72 (02-13-23 @ 05:10)  BP: 137/70 (02-13-23 @ 05:10)  RR: 17 (02-13-23 @ 05:10)  SpO2: 97% (02-13-23 @ 05:10)  Wt(kg): --  I&O's Detail          REVIEW OF SYSTEMS:    CONSTITUTIONAL:  No fevers, chills, sweats    HEENT:  Eyes:  No diplopia or blurred vision. ENT:  No earache, sore throat or runny nose.    CARDIOVASCULAR:  No pressure, squeezing, tightness, or heaviness about the chest; no palpitations.    RESPIRATORY:  Per HPI    GASTROINTESTINAL:  No abdominal pain, nausea, vomiting or diarrhea.    GENITOURINARY:  No dysuria, frequency or urgency.    NEUROLOGIC:  No paresthesias, fasciculations, seizures or weakness.    PSYCHIATRIC:  No disorder of thought or mood.      PHYSICAL EXAM:    Constitutional: Well developed and nourished  Eyes:Perrla  ENMT: normal  Neck:supple  Respiratory: good air entry  Cardiovascular: S1 S2 regular  Gastrointestinal: Soft, Non tender  Extremities: No edema  Vascular:normal  Neurological:Awake, alert,Ox3  Musculoskeletal:Normal      MEDICATIONS  (STANDING):  albuterol/ipratropium for Nebulization 3 milliLiter(s) Nebulizer every 6 hours  anastrozole 1 milliGRAM(s) Oral daily  apixaban 5 milliGRAM(s) Oral every 12 hours  artificial  tears Solution 1 Drop(s) Both EYES two times a day  carvedilol 6.25 milliGRAM(s) Oral every 12 hours  chlorhexidine 2% Cloths 1 Application(s) Topical <User Schedule>  insulin lispro (ADMELOG) corrective regimen sliding scale   SubCutaneous three times a day with meals  insulin lispro (ADMELOG) corrective regimen sliding scale   SubCutaneous at bedtime  pantoprazole    Tablet 40 milliGRAM(s) Oral before breakfast  simvastatin 20 milliGRAM(s) Oral at bedtime    MEDICATIONS  (PRN):  guaiFENesin Oral Liquid (Sugar-Free) 200 milliGRAM(s) Oral every 6 hours PRN Cough      Allergies    losartan (Angioedema)    Intolerances    Chicken (Stomach Upset)      LABS:                        13.0   8.14  )-----------( 224      ( 13 Feb 2023 07:08 )             42.1     02-13    141  |  102  |  10  ----------------------------<  155<H>  4.2   |  34<H>  |  0.73    Ca    9.0      13 Feb 2023 07:08                CAPILLARY BLOOD GLUCOSE      POCT Blood Glucose.: 147 mg/dL (13 Feb 2023 07:44)  POCT Blood Glucose.: 131 mg/dL (12 Feb 2023 21:41)  POCT Blood Glucose.: 174 mg/dL (12 Feb 2023 16:46)  POCT Blood Glucose.: 264 mg/dL (12 Feb 2023 11:08)        RADIOLOGY & ADDITIONAL TESTS:    CXR:    Ct scan chest:    ekg;    echo:

## 2023-02-13 NOTE — PROGRESS NOTE ADULT - ASSESSMENT
89 yo F, from home, with PMHx HTN, COPD, Afib, HFpEF (7/29/22 EF 55-60%, LVH, GIDD, Breast CA, and remote h/o LLE DVT p/w SOB and hypoxia to high 70s, admitted to medicine for AHRF 2/2 Acute diastolic CHF +/- COPD exacerbation and UTI (completed treatment). ICU consulted for concerns of AMS in the setting of Acute on chronic compensated respiratory acidosis requiring new trial of Bipap.  Patient downgraded to AI for further management. Currently on 2L NC. Will need BiPAP Q HS.   2/5 Change to AVAPS setting qHS.   02/08: patient medically optimized for discharge. Plan to d/c to home with AVAPS. On 2L NC and tolerating well. Awaiting for AVAPS machine. CM following.   02/09: Patient optimized medically and plan to home with family. Awaiting AVAPS. Eval for home O2 pending.

## 2023-02-14 LAB
GLUCOSE BLDC GLUCOMTR-MCNC: 108 MG/DL — HIGH (ref 70–99)
GLUCOSE BLDC GLUCOMTR-MCNC: 129 MG/DL — HIGH (ref 70–99)
GLUCOSE BLDC GLUCOMTR-MCNC: 133 MG/DL — HIGH (ref 70–99)
GLUCOSE BLDC GLUCOMTR-MCNC: 194 MG/DL — HIGH (ref 70–99)
SARS-COV-2 RNA SPEC QL NAA+PROBE: SIGNIFICANT CHANGE UP

## 2023-02-14 PROCEDURE — 99232 SBSQ HOSP IP/OBS MODERATE 35: CPT

## 2023-02-14 RX ORDER — TOBRAMYCIN AND DEXAMETHASONE 1; 3 MG/ML; MG/ML
0 SUSPENSION/ DROPS OPHTHALMIC
Qty: 0 | Refills: 0 | DISCHARGE

## 2023-02-14 RX ADMIN — CHLORHEXIDINE GLUCONATE 1 APPLICATION(S): 213 SOLUTION TOPICAL at 06:05

## 2023-02-14 RX ADMIN — Medication 3 MILLILITER(S): at 09:22

## 2023-02-14 RX ADMIN — APIXABAN 5 MILLIGRAM(S): 2.5 TABLET, FILM COATED ORAL at 17:42

## 2023-02-14 RX ADMIN — SIMVASTATIN 20 MILLIGRAM(S): 20 TABLET, FILM COATED ORAL at 21:24

## 2023-02-14 RX ADMIN — CARVEDILOL PHOSPHATE 6.25 MILLIGRAM(S): 80 CAPSULE, EXTENDED RELEASE ORAL at 17:42

## 2023-02-14 RX ADMIN — Medication 3 MILLILITER(S): at 02:35

## 2023-02-14 RX ADMIN — PANTOPRAZOLE SODIUM 40 MILLIGRAM(S): 20 TABLET, DELAYED RELEASE ORAL at 06:05

## 2023-02-14 RX ADMIN — Medication 1 DROP(S): at 17:42

## 2023-02-14 RX ADMIN — Medication 3 MILLILITER(S): at 15:40

## 2023-02-14 RX ADMIN — Medication 1 DROP(S): at 06:04

## 2023-02-14 RX ADMIN — APIXABAN 5 MILLIGRAM(S): 2.5 TABLET, FILM COATED ORAL at 06:04

## 2023-02-14 RX ADMIN — Medication 3 MILLILITER(S): at 20:06

## 2023-02-14 RX ADMIN — Medication 1: at 12:01

## 2023-02-14 RX ADMIN — ANASTROZOLE 1 MILLIGRAM(S): 1 TABLET ORAL at 12:01

## 2023-02-14 RX ADMIN — CARVEDILOL PHOSPHATE 6.25 MILLIGRAM(S): 80 CAPSULE, EXTENDED RELEASE ORAL at 06:05

## 2023-02-14 NOTE — PROGRESS NOTE ADULT - SUBJECTIVE AND OBJECTIVE BOX
NUNO LAST    SCU progress note    INTERVAL HPI/OVERNIGHT EVENTS: ***No overnight events.    DNR [ ]   DNI  [  ]   FULL CODE    Covid - 19 PCR: Negative 2/3    The 4Ms    What Matters Most: see GOC  Age appropriate Medications/Screen for High Risk Medication: Yes  Mentation: see CAM below  Mobility: defer to physical exam    The Confusion Assessment Method (CAM) Diagnostic Algorithm     1: Acute Onset or Fluctuating Course  - Is there evidence of an acute change in mental status from the patient’s baseline? Did the (abnormal) behavior  fluctuate during the day, that is, tend to come and go, or increase and decrease in severity?  [ ] YES [x ] NO     2: Inattention  - Did the patient have difficulty focusing attention, being easily distractible, or having difficulty keeping track of what was being said?  [ ] YES [x ] NO     3: Disorganized thinking  -Was the patient’s thinking disorganized or incoherent, such as rambling or irrelevant conversation, unclear or illogical flow of ideas, or unpredictable switching from subject to subject?  [ ] YES [x ] NO    4: Altered Level of consciousness?  [ ] YES [x ] NO    The diagnosis of delirium by CAM requires the presence of features 1 and 2 and either 3 or 4.    PRESSORS: [ ] YES [x ] NO    Cardiovascular:  Heart Failure  Acute   Acute on Chronic  Chronic       carvedilol 6.25 milliGRAM(s) Oral every 12 hours    Pulmonary:  albuterol/ipratropium for Nebulization 3 milliLiter(s) Nebulizer every 6 hours  guaiFENesin Oral Liquid (Sugar-Free) 200 milliGRAM(s) Oral every 6 hours PRN    Hematalogic:  apixaban 5 milliGRAM(s) Oral every 12 hours    Other:  anastrozole 1 milliGRAM(s) Oral daily  artificial  tears Solution 1 Drop(s) Both EYES two times a day  chlorhexidine 2% Cloths 1 Application(s) Topical <User Schedule>  insulin lispro (ADMELOG) corrective regimen sliding scale   SubCutaneous three times a day with meals  insulin lispro (ADMELOG) corrective regimen sliding scale   SubCutaneous at bedtime  pantoprazole    Tablet 40 milliGRAM(s) Oral before breakfast  simvastatin 20 milliGRAM(s) Oral at bedtime    albuterol/ipratropium for Nebulization 3 milliLiter(s) Nebulizer every 6 hours  anastrozole 1 milliGRAM(s) Oral daily  apixaban 5 milliGRAM(s) Oral every 12 hours  artificial  tears Solution 1 Drop(s) Both EYES two times a day  carvedilol 6.25 milliGRAM(s) Oral every 12 hours  chlorhexidine 2% Cloths 1 Application(s) Topical <User Schedule>  guaiFENesin Oral Liquid (Sugar-Free) 200 milliGRAM(s) Oral every 6 hours PRN  insulin lispro (ADMELOG) corrective regimen sliding scale   SubCutaneous three times a day with meals  insulin lispro (ADMELOG) corrective regimen sliding scale   SubCutaneous at bedtime  pantoprazole    Tablet 40 milliGRAM(s) Oral before breakfast  simvastatin 20 milliGRAM(s) Oral at bedtime    Drug Dosing Weight  Height (cm): 154.9 (30 Jan 2023 16:23)  Weight (kg): 120 (03 Feb 2023 15:15)  BMI (kg/m2): 50 (03 Feb 2023 15:15)  BSA (m2): 2.13 (03 Feb 2023 15:15)    CENTRAL LINE: [ ] YES [x ] NO  LOCATION:   DATE INSERTED:  REMOVE: [ ] YES [ ] NO  EXPLAIN:    AGUIRRE: [ ] YES [x ] NO    DATE INSERTED:  REMOVE:  [ ] YES [ ] NO  EXPLAIN:    PAST MEDICAL & SURGICAL HISTORY:  Arthritis      Legally blind      Pre-diabetes      Breast cancer  right, no chemo or radiation      COPD exacerbation      Atrial fibrillation      HF (heart failure)  HFpEF 7/29      HTN (hypertension)      Deep vein thrombosis (DVT)  Left Lower Extremity      H/O CHF      No significant past surgical history                        PHYSICAL EXAM:    GENERAL: NAD, well-groomed, well-developed  HEAD:  Atraumatic, Normocephalic  EYES: sclera clear  ENMT: No tonsillar erythema, exudates  NECK: Supple, No JVD  NERVOUS SYSTEM:  Alert & Oriented X3, Follows commands. Moving all extremities.  CHEST/LUNG: Clear to percussion bilaterally; No rales, rhonchi, wheezing, or rubs  HEART: Regular rate and rhythm; No murmurs, rubs, or gallops  ABDOMEN: Soft, Obese.  Nontender, Nondistended; Bowel sounds present  EXTREMITIES:  2+ Peripheral Pulses, No clubbing, cyanosis, or edema  LYMPH: No lymphadenopathy noted  SKIN: No rashes or lesions      LABS:  CBC Full  -  ( 13 Feb 2023 07:08 )  WBC Count : 8.14 K/uL  RBC Count : 4.20 M/uL  Hemoglobin : 13.0 g/dL  Hematocrit : 42.1 %  Platelet Count - Automated : 224 K/uL  Mean Cell Volume : 100.2 fl  Mean Cell Hemoglobin : 31.0 pg  Mean Cell Hemoglobin Concentration : 30.9 gm/dL  Auto Neutrophil # : x  Auto Lymphocyte # : x  Auto Monocyte # : x  Auto Eosinophil # : x  Auto Basophil # : x  Auto Neutrophil % : x  Auto Lymphocyte % : x  Auto Monocyte % : x  Auto Eosinophil % : x  Auto Basophil % : x    02-13    141  |  102  |  10  ----------------------------<  155<H>  4.2   |  34<H>  |  0.73    Ca    9.0      13 Feb 2023 07:08                [  ]  DVT Prophylaxis  [  ]  Nutrition, Brand, Rate         Goal Rate        Abnormal Nutritional Status -  Malnutrition   Cachexia          RADIOLOGY & ADDITIONAL STUDIES:  ***  < from: CT Chest No Cont (01.31.23 @ 16:36) >  FINDINGS:    Image quality degraded due to extensive respiratory motion.    AIRWAYS, LUNGS, PLEURA: Trachea and mainstem bronchi patent. Biapical   scarring unchanged. Bibasilar atelectasis. No focal lung consolidation.   No pleural effusion.    MEDIASTINUM: Cardiomegaly. No pericardial effusion. Thoracic aorta normal   caliber.  No large mediastinal lymph nodes.    IMAGED ABDOMEN: Unremarkable.    SOFT TISSUES: Unremarkable.    BONES: Unremarkable.      IMPRESSION:.    No focal lung consolidation.    Bibasilar atelectasis.    --- End of Report ---    < end of copied text >    Goals of Care Discussion with Family/Proxy/Other   - see note from 2/06/23

## 2023-02-14 NOTE — PROGRESS NOTE ADULT - SUBJECTIVE AND OBJECTIVE BOX
Patient was seen and examined  Patient is a 89y old  Female who presents with a chief complaint of AHRF (14 Feb 2023 14:52)      INTERVAL HPI/OVERNIGHT EVENTS:  T(C): 36.6 (02-14-23 @ 12:44), Max: 37 (02-13-23 @ 20:56)  HR: 80 (02-14-23 @ 17:00) (70 - 97)  BP: 135/68 (02-14-23 @ 17:00) (104/64 - 140/87)  RR: 18 (02-14-23 @ 17:00) (17 - 18)  SpO2: 100% (02-14-23 @ 17:00) (98% - 100%)  Wt(kg): --  I&O's Summary      LABS:                        13.0   8.14  )-----------( 224      ( 13 Feb 2023 07:08 )             42.1     02-13    141  |  102  |  10  ----------------------------<  155<H>  4.2   |  34<H>  |  0.73    Ca    9.0      13 Feb 2023 07:08          CAPILLARY BLOOD GLUCOSE      POCT Blood Glucose.: 133 mg/dL (14 Feb 2023 17:04)  POCT Blood Glucose.: 194 mg/dL (14 Feb 2023 11:49)  POCT Blood Glucose.: 108 mg/dL (14 Feb 2023 08:00)  POCT Blood Glucose.: 166 mg/dL (13 Feb 2023 21:09)              MEDICATIONS  (STANDING):  albuterol/ipratropium for Nebulization 3 milliLiter(s) Nebulizer every 6 hours  anastrozole 1 milliGRAM(s) Oral daily  apixaban 5 milliGRAM(s) Oral every 12 hours  artificial  tears Solution 1 Drop(s) Both EYES two times a day  carvedilol 6.25 milliGRAM(s) Oral every 12 hours  chlorhexidine 2% Cloths 1 Application(s) Topical <User Schedule>  insulin lispro (ADMELOG) corrective regimen sliding scale   SubCutaneous three times a day with meals  insulin lispro (ADMELOG) corrective regimen sliding scale   SubCutaneous at bedtime  pantoprazole    Tablet 40 milliGRAM(s) Oral before breakfast  simvastatin 20 milliGRAM(s) Oral at bedtime    MEDICATIONS  (PRN):  guaiFENesin Oral Liquid (Sugar-Free) 200 milliGRAM(s) Oral every 6 hours PRN Cough      RADIOLOGY & ADDITIONAL TESTS:    Imaging Personally Reviewed:  [ ] YES  [ ] NO    REVIEW OF SYSTEMS:  CONSTITUTIONAL: No fever, weight loss, or fatigue  EYES: No eye pain, visual disturbances, or discharge  ENMT:  No difficulty hearing, tinnitus, vertigo; No sinus or throat pain  NECK: No pain or stiffness  BREASTS: No pain, masses, or nipple discharge  RESPIRATORY: No cough, wheezing, chills or hemoptysis; No shortness of breath  CARDIOVASCULAR: No chest pain, palpitations, dizziness, or leg swelling  GASTROINTESTINAL: No abdominal or epigastric pain. No nausea, vomiting, or hematemesis; No diarrhea or constipation. No melena or hematochezia.  GENITOURINARY: No dysuria, frequency, hematuria, or incontinence  NEUROLOGICAL: No headaches, memory loss, loss of strength, numbness, or tremors  SKIN: No itching, burning, rashes, or lesions   LYMPH NODES: No enlarged glands  ENDOCRINE: No heat or cold intolerance; No hair loss  MUSCULOSKELETAL: No joint pain or swelling; No muscle, back, or extremity pain  PSYCHIATRIC: No depression, anxiety, mood swings, or difficulty sleeping  HEME/LYMPH: No easy bruising, or bleeding gums  ALLERY AND IMMUNOLOGIC: No hives or eczema      Consultant(s) Notes Reviewed:  [ x ] YES  [ ] NO    PHYSICAL EXAM:  GENERAL: NAD, well-groomed, well-developed  HEAD:  Atraumatic, Normocephalic  EYES: EOMI, PERRLA, conjunctiva and sclera clear  ENMT: No tonsillar erythema, exudates, or enlargement; Moist mucous membranes, Good dentition, No lesions  NECK: Supple, No JVD, Normal thyroid  NERVOUS SYSTEM:  Alert & Oriented X3, Good concentration; Motor Strength 5/5 B/L upper and lower extremities; DTRs 2+ intact and symmetric  CHEST/LUNG: Clear to percussion bilaterally; No rales, rhonchi, wheezing, or rubs  HEART: Regular rate and rhythm; No murmurs, rubs, or gallops  ABDOMEN: Soft, Nontender, Nondistended; Bowel sounds present  EXTREMITIES:  2+ Peripheral Pulses, No clubbing, cyanosis, or edema  LYMPH: No lymphadenopathy noted  SKIN: No rashes or lesions    Care Discussed with Consultants/Other Providers [ x] YES  [ ] NO

## 2023-02-14 NOTE — PROGRESS NOTE ADULT - PROBLEM SELECTOR PLAN 9
DVT and GI prophylaxis.  Failed BIPAP and AVAPS therapy. Remained hypercapnic on both. PCO2 57-84. Will need Trilogy/AVAPS-AE at home to adequately manage persistent hypercapnia. Without Trilogy/AVAPS-AE patient will remain hypercapnic increasing the possibility of readmission and intubation.  Continue bronchodilators. Steroids completed.  Has home oxygen.  Trilogy ordered 2/07/23.  Approved 2/14  Medically stable for discharge once Trilogy is delivered. Trilogy to be del;ivered and set up tomorrow.

## 2023-02-14 NOTE — PROGRESS NOTE ADULT - PROBLEM SELECTOR PLAN 2
Acute resolved. Diastolic heart failure with mild pulmonary hypertension.  Continue coreg BID.  ECHO July 2022- EF 55-60%, LVH, GIDD, mild pulm HTN  pro-BNP 3916 (prev 1943)  S/p diuresis in ICU. Completed diuretics  Daily weight   Dr Siu following.

## 2023-02-14 NOTE — PROGRESS NOTE ADULT - ASSESSMENT
89 yo F, from home, with PMHx HTN, COPD, Afib, HFpEF (7/29/22 EF 55-60%, LVH, GIDD, Breast CA, and remote h/o LLE DVT p/w SOB and hypoxia to high 70s, admitted to medicine for AHRF 2/2 Acute diastolic CHF +/- COPD exacerbation and UTI (completed treatment). ICU consulted for concerns of AMS in the setting of Acute on chronic compensated respiratory acidosis requiring new trial of Bipap.  Patient downgraded to AI for further management. Currently on 2L NC. Will need BiPAP Q HS.   2/5 Change to AVAPS setting qHS.   02/08: patient medically optimized for discharge. Plan to d/c to home with AVAPS. On 2L NC and tolerating well. Awaiting for AVAPS machine. CM following.   02/09: Patient optimized medically and plan to home with family. Awaiting AVAPS. Has home oxygen  2/14  Trilogy approved. Will be set up and delivered tomorrow.

## 2023-02-14 NOTE — PROGRESS NOTE ADULT - SUBJECTIVE AND OBJECTIVE BOX
Patient is a 89y old  Female who presents with a chief complaint of AHRF (13 Feb 2023 11:55)  Awake, alert, resting in bed comfortably, doing well on oxygen supp via NC in NAD.    INTERVAL HPI/OVERNIGHT EVENTS:      VITAL SIGNS:  T(F): 97.3 (02-14-23 @ 04:59)  HR: 82 (02-14-23 @ 04:59)  BP: 140/87 (02-14-23 @ 04:59)  RR: 18 (02-14-23 @ 04:59)  SpO2: 100% (02-14-23 @ 04:59)  Wt(kg): --  I&O's Detail          REVIEW OF SYSTEMS:    CONSTITUTIONAL:  No fevers, chills, sweats    HEENT:  Eyes:  No diplopia or blurred vision. ENT:  No earache, sore throat or runny nose.    CARDIOVASCULAR:  No pressure, squeezing, tightness, or heaviness about the chest; no palpitations.    RESPIRATORY:  Per HPI    GASTROINTESTINAL:  No abdominal pain, nausea, vomiting or diarrhea.    GENITOURINARY:  No dysuria, frequency or urgency.    NEUROLOGIC:  No paresthesias, fasciculations, seizures or weakness.    PSYCHIATRIC:  No disorder of thought or mood.      PHYSICAL EXAM:    Constitutional: Well developed and nourished  Eyes:Perrla  ENMT: normal  Neck:supple  Respiratory: good air entry  Cardiovascular: S1 S2 regular  Gastrointestinal: Soft, Non tender  Extremities: No edema  Vascular:normal  Neurological:Awake, alert,Ox3  Musculoskeletal:Normal      MEDICATIONS  (STANDING):  albuterol/ipratropium for Nebulization 3 milliLiter(s) Nebulizer every 6 hours  anastrozole 1 milliGRAM(s) Oral daily  apixaban 5 milliGRAM(s) Oral every 12 hours  artificial  tears Solution 1 Drop(s) Both EYES two times a day  carvedilol 6.25 milliGRAM(s) Oral every 12 hours  chlorhexidine 2% Cloths 1 Application(s) Topical <User Schedule>  insulin lispro (ADMELOG) corrective regimen sliding scale   SubCutaneous three times a day with meals  insulin lispro (ADMELOG) corrective regimen sliding scale   SubCutaneous at bedtime  pantoprazole    Tablet 40 milliGRAM(s) Oral before breakfast  simvastatin 20 milliGRAM(s) Oral at bedtime    MEDICATIONS  (PRN):  guaiFENesin Oral Liquid (Sugar-Free) 200 milliGRAM(s) Oral every 6 hours PRN Cough      Allergies    losartan (Angioedema)    Intolerances    Chicken (Stomach Upset)      LABS:                        13.0   8.14  )-----------( 224      ( 13 Feb 2023 07:08 )             42.1     02-13    141  |  102  |  10  ----------------------------<  155<H>  4.2   |  34<H>  |  0.73    Ca    9.0      13 Feb 2023 07:08                CAPILLARY BLOOD GLUCOSE      POCT Blood Glucose.: 108 mg/dL (14 Feb 2023 08:00)  POCT Blood Glucose.: 166 mg/dL (13 Feb 2023 21:09)  POCT Blood Glucose.: 185 mg/dL (13 Feb 2023 16:27)  POCT Blood Glucose.: 213 mg/dL (13 Feb 2023 11:34)        RADIOLOGY & ADDITIONAL TESTS:    CXR:    Ct scan chest:    ekg;    echo:

## 2023-02-14 NOTE — PROGRESS NOTE ADULT - SUBJECTIVE AND OBJECTIVE BOX
CHIEF COMPLAINT:Patient is a 89y old  Female who presents with a chief complaint of AHRF.Pt appears comfortable.    	  REVIEW OF SYSTEMS:  CONSTITUTIONAL: No fever, weight loss, or fatigue  EYES: No eye pain, visual disturbances, or discharge  ENT:  No difficulty hearing, tinnitus, vertigo; No sinus or throat pain  NECK: No pain or stiffness  RESPIRATORY: No cough, wheezing, chills or hemoptysis; No Shortness of Breath  CARDIOVASCULAR: No chest pain, palpitations, passing out, dizziness, or leg swelling  GASTROINTESTINAL: No abdominal or epigastric pain. No nausea, vomiting, or hematemesis; No diarrhea or constipation. No melena or hematochezia.  GENITOURINARY: No dysuria, frequency, hematuria, or incontinence  NEUROLOGICAL: No headaches, memory loss, loss of strength, numbness, or tremors  SKIN: No itching, burning, rashes, or lesions   LYMPH Nodes: No enlarged glands  ENDOCRINE: No heat or cold intolerance; No hair loss  MUSCULOSKELETAL: No joint pain or swelling; No muscle, back, or extremity pain  PSYCHIATRIC: No depression, anxiety, mood swings, or difficulty sleeping  HEME/LYMPH: No easy bruising, or bleeding gums  ALLERGY AND IMMUNOLOGIC: No hives or eczema	    PHYSICAL EXAM:  T(C): 36.3 (02-14-23 @ 04:59), Max: 37 (02-13-23 @ 20:56)  HR: 82 (02-14-23 @ 04:59) (82 - 97)  BP: 140/87 (02-14-23 @ 04:59) (115/79 - 140/87)  RR: 18 (02-14-23 @ 04:59) (17 - 18)  SpO2: 100% (02-14-23 @ 04:59) (93% - 100%)  Wt(kg): --  I&O's Summary      Appearance: Normal	  HEENT:   Normal oral mucosa, PERRL, EOMI	  Lymphatic: No lymphadenopathy  Cardiovascular: Normal S1 S2, No JVD, No murmurs, No edema  Respiratory: Lungs clear to auscultation	  Psychiatry: A & O x 3, Mood & affect appropriate  Gastrointestinal:  Soft, Non-tender, + BS	  Skin: No rashes, No ecchymoses, No cyanosis	  Neurologic: Non-focal  Extremities: Normal range of motion, No clubbing, cyanosis or edema  Vascular: Peripheral pulses palpable 2+ bilaterally    MEDICATIONS  (STANDING):  albuterol/ipratropium for Nebulization 3 milliLiter(s) Nebulizer every 6 hours  anastrozole 1 milliGRAM(s) Oral daily  apixaban 5 milliGRAM(s) Oral every 12 hours  artificial  tears Solution 1 Drop(s) Both EYES two times a day  carvedilol 6.25 milliGRAM(s) Oral every 12 hours  chlorhexidine 2% Cloths 1 Application(s) Topical <User Schedule>  insulin lispro (ADMELOG) corrective regimen sliding scale   SubCutaneous three times a day with meals  insulin lispro (ADMELOG) corrective regimen sliding scale   SubCutaneous at bedtime  pantoprazole    Tablet 40 milliGRAM(s) Oral before breakfast  simvastatin 20 milliGRAM(s) Oral at bedtime        LABS:	 	                              13.0   8.14  )-----------( 224      ( 13 Feb 2023 07:08 )             42.1     02-13    141  |  102  |  10  ----------------------------<  155<H>  4.2   |  34<H>  |  0.73    Ca    9.0      13 Feb 2023 07:08      proBNP: Serum Pro-Brain Natriuretic Peptide: 3916 pg/mL (01-30 @ 20:20)    Lipid Profile:   HgA1c:   TSH:

## 2023-02-15 LAB
GLUCOSE BLDC GLUCOMTR-MCNC: 100 MG/DL — HIGH (ref 70–99)
GLUCOSE BLDC GLUCOMTR-MCNC: 128 MG/DL — HIGH (ref 70–99)
GLUCOSE BLDC GLUCOMTR-MCNC: 133 MG/DL — HIGH (ref 70–99)
GLUCOSE BLDC GLUCOMTR-MCNC: 254 MG/DL — HIGH (ref 70–99)

## 2023-02-15 PROCEDURE — 99232 SBSQ HOSP IP/OBS MODERATE 35: CPT

## 2023-02-15 RX ADMIN — CARVEDILOL PHOSPHATE 6.25 MILLIGRAM(S): 80 CAPSULE, EXTENDED RELEASE ORAL at 17:22

## 2023-02-15 RX ADMIN — PANTOPRAZOLE SODIUM 40 MILLIGRAM(S): 20 TABLET, DELAYED RELEASE ORAL at 06:29

## 2023-02-15 RX ADMIN — Medication 1 DROP(S): at 06:29

## 2023-02-15 RX ADMIN — Medication 3: at 12:10

## 2023-02-15 RX ADMIN — CHLORHEXIDINE GLUCONATE 1 APPLICATION(S): 213 SOLUTION TOPICAL at 06:28

## 2023-02-15 RX ADMIN — SIMVASTATIN 20 MILLIGRAM(S): 20 TABLET, FILM COATED ORAL at 21:37

## 2023-02-15 RX ADMIN — Medication 3 MILLILITER(S): at 15:56

## 2023-02-15 RX ADMIN — CARVEDILOL PHOSPHATE 6.25 MILLIGRAM(S): 80 CAPSULE, EXTENDED RELEASE ORAL at 06:29

## 2023-02-15 RX ADMIN — Medication 1 DROP(S): at 17:21

## 2023-02-15 RX ADMIN — Medication 3 MILLILITER(S): at 08:56

## 2023-02-15 RX ADMIN — APIXABAN 5 MILLIGRAM(S): 2.5 TABLET, FILM COATED ORAL at 06:29

## 2023-02-15 RX ADMIN — Medication 3 MILLILITER(S): at 20:04

## 2023-02-15 RX ADMIN — Medication 3 MILLILITER(S): at 03:06

## 2023-02-15 RX ADMIN — APIXABAN 5 MILLIGRAM(S): 2.5 TABLET, FILM COATED ORAL at 17:22

## 2023-02-15 RX ADMIN — ANASTROZOLE 1 MILLIGRAM(S): 1 TABLET ORAL at 12:11

## 2023-02-15 NOTE — PROGRESS NOTE ADULT - PROBLEM SELECTOR PLAN 9
DVT and GI prophylaxis.  Failed BIPAP and AVAPS therapy. Remained hypercapnic on both. PCO2 57-84. Will need Trilogy/AVAPS-AE at home to adequately manage persistent hypercapnia. Without Trilogy/AVAPS-AE patient will remain hypercapnic increasing the possibility of readmission and intubation.  Continue bronchodilators. Steroids completed.  Has home oxygen.  Trilogy ordered 2/07/23.  Approved 2/14  Medically stable for discharge once Trilogy is delivered. Trilogy to be delivered and set up today DVT and GI prophylaxis.  Failed BIPAP and AVAPS therapy. Remained hypercapnic on both. PCO2 57-84. Will need Trilogy/AVAPS-AE at home to adequately manage persistent hypercapnia. Without Trilogy/AVAPS-AE patient will remain hypercapnic increasing the possibility of readmission and intubation.  Continue bronchodilators. Steroids completed.  Has home oxygen.  Trilogy ordered 2/07/23.  Approved 2/14  Awaiting final auth.  Medically stable for discharge once Trilogy is delivered. Machine approved. Still waiting for final auth.

## 2023-02-15 NOTE — PROGRESS NOTE ADULT - ASSESSMENT
89 yo F, from home, with PMHx HTN, COPD, Afib, HFpEF (7/29/22 EF 55-60%, LVH, GIDD, Breast CA, and remote h/o LLE DVT p/w SOB and hypoxia to high 70s, admitted to medicine for AHRF 2/2 Acute diastolic CHF +/- COPD exacerbation and UTI (completed treatment). ICU consulted for concerns of AMS in the setting of Acute on chronic compensated respiratory acidosis requiring new trial of Bipap.  Patient downgraded to AI for further management. Currently on 2L NC. Will need BiPAP Q HS.   2/5 Change to AVAPS setting qHS.   02/08: patient medically optimized for discharge. Plan to d/c to home with AVAPS. On 2L NC and tolerating well. Awaiting for AVAPS machine. CM following.   02/09: Patient optimized medically and plan to home with family. Awaiting AVAPS. Has home oxygen  2/14  Trilogy approved.  dc planing today

## 2023-02-15 NOTE — PROGRESS NOTE ADULT - SUBJECTIVE AND OBJECTIVE BOX
Patient is a 89y old  Female who presents with a chief complaint of AHRF (15 Feb 2023 09:30)  Awake, alert, comfortable in bed, doing well on RA, in NAD.    INTERVAL HPI/OVERNIGHT EVENTS:      VITAL SIGNS:  T(F): 98.2 (02-14-23 @ 21:00)  HR: 89 (02-15-23 @ 05:24)  BP: 140/92 (02-15-23 @ 05:24)  RR: 18 (02-15-23 @ 05:24)  SpO2: 94% (02-15-23 @ 05:24)  Wt(kg): --  I&O's Detail          REVIEW OF SYSTEMS:    CONSTITUTIONAL:  No fevers, chills, sweats    HEENT:  Eyes:  No diplopia or blurred vision. ENT:  No earache, sore throat or runny nose.    CARDIOVASCULAR:  No pressure, squeezing, tightness, or heaviness about the chest; no palpitations.    RESPIRATORY:  Per HPI    GASTROINTESTINAL:  No abdominal pain, nausea, vomiting or diarrhea.    GENITOURINARY:  No dysuria, frequency or urgency.    NEUROLOGIC:  No paresthesias, fasciculations, seizures or weakness.    PSYCHIATRIC:  No disorder of thought or mood.      PHYSICAL EXAM:    Constitutional: Well developed and nourished  Eyes:Perrla  ENMT: normal  Neck:supple  Respiratory: good air entry  Cardiovascular: S1 S2 regular  Gastrointestinal: Soft, Non tender  Extremities: No edema  Vascular:normal  Neurological:Awake, alert,Ox3  Musculoskeletal:Normal      MEDICATIONS  (STANDING):  albuterol/ipratropium for Nebulization 3 milliLiter(s) Nebulizer every 6 hours  anastrozole 1 milliGRAM(s) Oral daily  apixaban 5 milliGRAM(s) Oral every 12 hours  artificial  tears Solution 1 Drop(s) Both EYES two times a day  carvedilol 6.25 milliGRAM(s) Oral every 12 hours  chlorhexidine 2% Cloths 1 Application(s) Topical <User Schedule>  insulin lispro (ADMELOG) corrective regimen sliding scale   SubCutaneous three times a day with meals  insulin lispro (ADMELOG) corrective regimen sliding scale   SubCutaneous at bedtime  pantoprazole    Tablet 40 milliGRAM(s) Oral before breakfast  simvastatin 20 milliGRAM(s) Oral at bedtime    MEDICATIONS  (PRN):  guaiFENesin Oral Liquid (Sugar-Free) 200 milliGRAM(s) Oral every 6 hours PRN Cough      Allergies    losartan (Angioedema)    Intolerances    Chicken (Stomach Upset)      LABS:                    CAPILLARY BLOOD GLUCOSE      POCT Blood Glucose.: 133 mg/dL (15 Feb 2023 07:58)  POCT Blood Glucose.: 129 mg/dL (14 Feb 2023 22:43)  POCT Blood Glucose.: 133 mg/dL (14 Feb 2023 17:04)  POCT Blood Glucose.: 194 mg/dL (14 Feb 2023 11:49)        RADIOLOGY & ADDITIONAL TESTS:    CXR:    Ct scan chest:    ekg;    echo: Patient is a 89y old  Female who presents with a chief complaint of AHRF (15 Feb 2023 09:30)  Awake, alert, comfortable in bed, doing well on oxygen supp via NC, in NAD.    INTERVAL HPI/OVERNIGHT EVENTS:      VITAL SIGNS:  T(F): 98.2 (02-14-23 @ 21:00)  HR: 89 (02-15-23 @ 05:24)  BP: 140/92 (02-15-23 @ 05:24)  RR: 18 (02-15-23 @ 05:24)  SpO2: 94% (02-15-23 @ 05:24)  Wt(kg): --  I&O's Detail          REVIEW OF SYSTEMS:    CONSTITUTIONAL:  No fevers, chills, sweats    HEENT:  Eyes:  No diplopia or blurred vision. ENT:  No earache, sore throat or runny nose.    CARDIOVASCULAR:  No pressure, squeezing, tightness, or heaviness about the chest; no palpitations.    RESPIRATORY:  Per HPI    GASTROINTESTINAL:  No abdominal pain, nausea, vomiting or diarrhea.    GENITOURINARY:  No dysuria, frequency or urgency.    NEUROLOGIC:  No paresthesias, fasciculations, seizures or weakness.    PSYCHIATRIC:  No disorder of thought or mood.      PHYSICAL EXAM:    Constitutional: Well developed and nourished  Eyes:Perrla  ENMT: normal  Neck:supple  Respiratory: good air entry  Cardiovascular: S1 S2 regular  Gastrointestinal: Soft, Non tender  Extremities: No edema  Vascular:normal  Neurological:Awake, alert,Ox3  Musculoskeletal:Normal      MEDICATIONS  (STANDING):  albuterol/ipratropium for Nebulization 3 milliLiter(s) Nebulizer every 6 hours  anastrozole 1 milliGRAM(s) Oral daily  apixaban 5 milliGRAM(s) Oral every 12 hours  artificial  tears Solution 1 Drop(s) Both EYES two times a day  carvedilol 6.25 milliGRAM(s) Oral every 12 hours  chlorhexidine 2% Cloths 1 Application(s) Topical <User Schedule>  insulin lispro (ADMELOG) corrective regimen sliding scale   SubCutaneous three times a day with meals  insulin lispro (ADMELOG) corrective regimen sliding scale   SubCutaneous at bedtime  pantoprazole    Tablet 40 milliGRAM(s) Oral before breakfast  simvastatin 20 milliGRAM(s) Oral at bedtime    MEDICATIONS  (PRN):  guaiFENesin Oral Liquid (Sugar-Free) 200 milliGRAM(s) Oral every 6 hours PRN Cough      Allergies    losartan (Angioedema)    Intolerances    Chicken (Stomach Upset)      LABS:                    CAPILLARY BLOOD GLUCOSE      POCT Blood Glucose.: 133 mg/dL (15 Feb 2023 07:58)  POCT Blood Glucose.: 129 mg/dL (14 Feb 2023 22:43)  POCT Blood Glucose.: 133 mg/dL (14 Feb 2023 17:04)  POCT Blood Glucose.: 194 mg/dL (14 Feb 2023 11:49)        RADIOLOGY & ADDITIONAL TESTS:    CXR:    Ct scan chest:    ekg;    echo:

## 2023-02-15 NOTE — PROGRESS NOTE ADULT - PROBLEM SELECTOR PLAN 2
Bronchodilators prn  Steroids po  Spiriva  PFTs as OP Bronchodilators prn  Steroids po  Spiriva  PFTs as OP  DC planning with NIV

## 2023-02-15 NOTE — PROGRESS NOTE ADULT - SUBJECTIVE AND OBJECTIVE BOX
Patient was seen and examined  Patient is a 89y old  Female who presents with a chief complaint of AHRF (15 Feb 2023 12:49)      INTERVAL HPI/OVERNIGHT EVENTS:  T(C): 36.6 (02-15-23 @ 12:23), Max: 36.8 (02-14-23 @ 21:00)  HR: 83 (02-15-23 @ 12:23) (78 - 91)  BP: 130/77 (02-15-23 @ 12:23) (118/67 - 140/92)  RR: 18 (02-15-23 @ 12:23) (18 - 20)  SpO2: 100% (02-15-23 @ 12:23) (94% - 100%)  Wt(kg): --  I&O's Summary      LABS:              CAPILLARY BLOOD GLUCOSE      POCT Blood Glucose.: 254 mg/dL (15 Feb 2023 12:06)  POCT Blood Glucose.: 133 mg/dL (15 Feb 2023 07:58)  POCT Blood Glucose.: 129 mg/dL (14 Feb 2023 22:43)  POCT Blood Glucose.: 133 mg/dL (14 Feb 2023 17:04)              MEDICATIONS  (STANDING):  albuterol/ipratropium for Nebulization 3 milliLiter(s) Nebulizer every 6 hours  anastrozole 1 milliGRAM(s) Oral daily  apixaban 5 milliGRAM(s) Oral every 12 hours  artificial  tears Solution 1 Drop(s) Both EYES two times a day  carvedilol 6.25 milliGRAM(s) Oral every 12 hours  chlorhexidine 2% Cloths 1 Application(s) Topical <User Schedule>  insulin lispro (ADMELOG) corrective regimen sliding scale   SubCutaneous three times a day with meals  insulin lispro (ADMELOG) corrective regimen sliding scale   SubCutaneous at bedtime  pantoprazole    Tablet 40 milliGRAM(s) Oral before breakfast  simvastatin 20 milliGRAM(s) Oral at bedtime    MEDICATIONS  (PRN):  guaiFENesin Oral Liquid (Sugar-Free) 200 milliGRAM(s) Oral every 6 hours PRN Cough      RADIOLOGY & ADDITIONAL TESTS:    Imaging Personally Reviewed:  [ ] YES  [ ] NO    REVIEW OF SYSTEMS:  CONSTITUTIONAL: No fever, weight loss, or fatigue  EYES: No eye pain, visual disturbances, or discharge  ENMT:  No difficulty hearing, tinnitus, vertigo; No sinus or throat pain  NECK: No pain or stiffness  BREASTS: No pain, masses, or nipple discharge  RESPIRATORY: No cough, wheezing, chills or hemoptysis; No shortness of breath  CARDIOVASCULAR: No chest pain, palpitations, dizziness, or leg swelling  GASTROINTESTINAL: No abdominal or epigastric pain. No nausea, vomiting, or hematemesis; No diarrhea or constipation. No melena or hematochezia.  GENITOURINARY: No dysuria, frequency, hematuria, or incontinence  NEUROLOGICAL: No headaches, memory loss, loss of strength, numbness, or tremors  SKIN: No itching, burning, rashes, or lesions   LYMPH NODES: No enlarged glands  ENDOCRINE: No heat or cold intolerance; No hair loss  MUSCULOSKELETAL: No joint pain or swelling; No muscle, back, or extremity pain  PSYCHIATRIC: No depression, anxiety, mood swings, or difficulty sleeping  HEME/LYMPH: No easy bruising, or bleeding gums  ALLERY AND IMMUNOLOGIC: No hives or eczema      Consultant(s) Notes Reviewed:  [ x ] YES  [ ] NO    PHYSICAL EXAM:  GENERAL: NAD, well-groomed, well-developed  HEAD:  Atraumatic, Normocephalic  EYES: EOMI, PERRLA, conjunctiva and sclera clear  ENMT: No tonsillar erythema, exudates, or enlargement; Moist mucous membranes, Good dentition, No lesions  NECK: Supple, No JVD, Normal thyroid  NERVOUS SYSTEM:  Alert & Oriented X3, Good concentration; Motor Strength 5/5 B/L upper and lower extremities; DTRs 2+ intact and symmetric  CHEST/LUNG: Clear to percussion bilaterally; No rales, rhonchi, wheezing, or rubs  HEART: Regular rate and rhythm; No murmurs, rubs, or gallops  ABDOMEN: Soft, Nontender, Nondistended; Bowel sounds present  EXTREMITIES:  2+ Peripheral Pulses, No clubbing, cyanosis, or edema  LYMPH: No lymphadenopathy noted  SKIN: No rashes or lesions    Care Discussed with Consultants/Other Providers [ x] YES  [ ] NO

## 2023-02-15 NOTE — PROGRESS NOTE ADULT - ASSESSMENT
89 yo F, from home, with PMHx HTN, COPD, Afib, HFpEF (7/29/22 EF 55-60%, LVH, GIDD, Breast CA, and remote h/o LLE DVT p/w SOB and hypoxia to high 70s, admitted to medicine for AHRF 2/2 Acute diastolic CHF +/- COPD exacerbation and UTI (completed treatment). ICU consulted for concerns of AMS in the setting of Acute on chronic compensated respiratory acidosis requiring new trial of Bipap.  Patient downgraded to AI for further management. Currently on 2L NC. Will need BiPAP Q HS.   2/5 Change to AVAPS setting qHS.   02/08: patient medically optimized for discharge. Plan to d/c to home with AVAPS. On 2L NC and tolerating well. Awaiting for AVAPS machine. CM following.   02/09: Patient optimized medically and plan to home with family. Awaiting AVAPS. Has home oxygen  2/14  Trilogy approved. Will be set up and delivered tomorrow.

## 2023-02-15 NOTE — PROGRESS NOTE ADULT - SUBJECTIVE AND OBJECTIVE BOX
NUNO LAST    SCU progress note    INTERVAL HPI/OVERNIGHT EVENTS: ***No overnight events. Trilogy authorized. Patient to be discharged today.    DNR [ ]   DNI  [  ]   FULL CODE    Covid - 19 PCR: Negative 2/14    The 4Ms    What Matters Most: see GOC  Age appropriate Medications/Screen for High Risk Medication: Yes  Mentation: see CAM below  Mobility: defer to physical exam    The Confusion Assessment Method (CAM) Diagnostic Algorithm     1: Acute Onset or Fluctuating Course  - Is there evidence of an acute change in mental status from the patient’s baseline? Did the (abnormal) behavior  fluctuate during the day, that is, tend to come and go, or increase and decrease in severity?  [ ] YES x] NO     2: Inattention  - Did the patient have difficulty focusing attention, being easily distractible, or having difficulty keeping track of what was being said?  [ ] YES [x ] NO     3: Disorganized thinking  -Was the patient’s thinking disorganized or incoherent, such as rambling or irrelevant conversation, unclear or illogical flow of ideas, or unpredictable switching from subject to subject?  [ ] YES [x ] NO    4: Altered Level of consciousness?  [ ] YES [x ] NO    The diagnosis of delirium by CAM requires the presence of features 1 and 2 and either 3 or 4.    PRESSORS: [ ] YES [x ] NO    Cardiovascular:  Heart Failure  Acute   Acute on Chronic  Chronic       carvedilol 6.25 milliGRAM(s) Oral every 12 hours    Pulmonary:  albuterol/ipratropium for Nebulization 3 milliLiter(s) Nebulizer every 6 hours  guaiFENesin Oral Liquid (Sugar-Free) 200 milliGRAM(s) Oral every 6 hours PRN    Hematalogic:  apixaban 5 milliGRAM(s) Oral every 12 hours    Other:  anastrozole 1 milliGRAM(s) Oral daily  artificial  tears Solution 1 Drop(s) Both EYES two times a day  chlorhexidine 2% Cloths 1 Application(s) Topical <User Schedule>  insulin lispro (ADMELOG) corrective regimen sliding scale   SubCutaneous three times a day with meals  insulin lispro (ADMELOG) corrective regimen sliding scale   SubCutaneous at bedtime  pantoprazole    Tablet 40 milliGRAM(s) Oral before breakfast  simvastatin 20 milliGRAM(s) Oral at bedtime    albuterol/ipratropium for Nebulization 3 milliLiter(s) Nebulizer every 6 hours  anastrozole 1 milliGRAM(s) Oral daily  apixaban 5 milliGRAM(s) Oral every 12 hours  artificial  tears Solution 1 Drop(s) Both EYES two times a day  carvedilol 6.25 milliGRAM(s) Oral every 12 hours  chlorhexidine 2% Cloths 1 Application(s) Topical <User Schedule>  guaiFENesin Oral Liquid (Sugar-Free) 200 milliGRAM(s) Oral every 6 hours PRN  insulin lispro (ADMELOG) corrective regimen sliding scale   SubCutaneous three times a day with meals  insulin lispro (ADMELOG) corrective regimen sliding scale   SubCutaneous at bedtime  pantoprazole    Tablet 40 milliGRAM(s) Oral before breakfast  simvastatin 20 milliGRAM(s) Oral at bedtime    Drug Dosing Weight  Height (cm): 154.9 (30 Jan 2023 16:23)  Weight (kg): 120 (03 Feb 2023 15:15)  BMI (kg/m2): 50 (03 Feb 2023 15:15)  BSA (m2): 2.13 (03 Feb 2023 15:15)    CENTRAL LINE: [ ] YES [x ] NO  LOCATION:   DATE INSERTED:  REMOVE: [ ] YES [ ] NO  EXPLAIN:    AGUIRRE: [ ] YES [x ] NO    DATE INSERTED:  REMOVE:  [ ] YES [ ] NO  EXPLAIN:    PAST MEDICAL & SURGICAL HISTORY:  Arthritis      Legally blind      Pre-diabetes      Breast cancer  right, no chemo or radiation      COPD exacerbation      Atrial fibrillation      HF (heart failure)  HFpEF 7/29      HTN (hypertension)      Deep vein thrombosis (DVT)  Left Lower Extremity      H/O CHF      No significant past surgical history                        PHYSICAL EXAM:    GENERAL: NAD, well-groomed, well-developed  HEAD:  Atraumatic, Normocephalic  EYES: Blind  ENMT: No tonsillar erythema, exudates  NECK: Supple  NERVOUS SYSTEM:  Alert & Oriented X3, Follows commands. Moving all extremities.  CHEST/LUNG: Clear to percussion bilaterally; No rales, rhonchi, wheezing, or rubs  HEART: Regular rate and rhythm; No murmurs, rubs, or gallops  ABDOMEN: Soft, Obese. Nontender, Nondistended; Bowel sounds present  EXTREMITIES:  2+ Peripheral Pulses, No clubbing, cyanosis, or edema  LYMPH: No lymphadenopathy noted  SKIN: No rashes or lesions      LABS:                    [  ]  DVT Prophylaxis  [  ]  Nutrition, Brand, Rate         Goal Rate        Abnormal Nutritional Status -  Malnutrition   Cachexia          RADIOLOGY & ADDITIONAL STUDIES:  ***  < from: CT Chest No Cont (01.31.23 @ 16:36) >  FINDINGS:    Image quality degraded due to extensive respiratory motion.    AIRWAYS, LUNGS, PLEURA: Trachea and mainstem bronchi patent. Biapical   scarring unchanged. Bibasilar atelectasis. No focal lung consolidation.   No pleural effusion.    MEDIASTINUM: Cardiomegaly. No pericardial effusion. Thoracic aorta normal   caliber.  No large mediastinal lymph nodes.    IMAGED ABDOMEN: Unremarkable.    SOFT TISSUES: Unremarkable.    BONES: Unremarkable.      IMPRESSION:.    No focal lung consolidation.    Bibasilar atelectasis.    < end of copied text >    Goals of Care Discussion with Family/Proxy/Other   - see note from  2/06/23

## 2023-02-15 NOTE — PROGRESS NOTE ADULT - PROBLEM SELECTOR PLAN 1
Acute on chronic respiratory acidosis, COPD  and  Acute diastolic heart failure.   Acute resolved.  Completed steroids.  Continue bronchodilators.  Continue AVAPS nightly and as needed during the day. AVAPS-AE ordered 2/7/23 for home use  Remains hypercapnic on AVAPS PCO2 57. PCO2 on admission on BIPAP 84.  Failed BIPAP and AVAPS therapy.  Will need Trilogy/AVAPS-AE upon discharge. Without AVAPS-AE patient is a high high for readmission and intubation secondary to persistent hypercapnia. Second admission for respiratory distress since October 2022. Patient also has spinal deformities which is contributing to her hypercapnia.  Trilogy approved 2/14

## 2023-02-15 NOTE — PROGRESS NOTE ADULT - PROBLEM SELECTOR PLAN 9
DVT and GI prophylaxis.  Failed BIPAP and AVAPS therapy. Remained hypercapnic on both. PCO2 57-84. Will need Trilogy/AVAPS-AE at home to adequately manage persistent hypercapnia. Without Trilogy/AVAPS-AE patient will remain hypercapnic increasing the possibility of readmission and intubation.  Continue bronchodilators. Steroids completed.  Has home oxygen.  Trilogy ordered 2/07/23.  Approved 2/14  Medically stable for discharge once Trilogy is delivered. Trilogy to be delivered and set up today

## 2023-02-15 NOTE — PROGRESS NOTE ADULT - SUBJECTIVE AND OBJECTIVE BOX
CHIEF COMPLAINT:Patient is a 89y old  Female who presents with a chief complaint of AHRF.Pt appears comfortable.    	  REVIEW OF SYSTEMS:  CONSTITUTIONAL: No fever, weight loss, or fatigue  EYES: No eye pain, visual disturbances, or discharge  ENT:  No difficulty hearing, tinnitus, vertigo; No sinus or throat pain  NECK: No pain or stiffness  RESPIRATORY: No cough, wheezing, chills or hemoptysis; No Shortness of Breath  CARDIOVASCULAR: No chest pain, palpitations, passing out, dizziness, or leg swelling  GASTROINTESTINAL: No abdominal or epigastric pain. No nausea, vomiting, or hematemesis; No diarrhea or constipation. No melena or hematochezia.  GENITOURINARY: No dysuria, frequency, hematuria, or incontinence  NEUROLOGICAL: No headaches, memory loss, loss of strength, numbness, or tremors  SKIN: No itching, burning, rashes, or lesions   LYMPH Nodes: No enlarged glands  ENDOCRINE: No heat or cold intolerance; No hair loss  MUSCULOSKELETAL: No joint pain or swelling; No muscle, back, or extremity pain  PSYCHIATRIC: No depression, anxiety, mood swings, or difficulty sleeping  HEME/LYMPH: No easy bruising, or bleeding gums  ALLERGY AND IMMUNOLOGIC: No hives or eczema	      PHYSICAL EXAM:  T(C): 36.6 (02-15-23 @ 12:23), Max: 36.8 (02-14-23 @ 21:00)  HR: 83 (02-15-23 @ 12:23) (78 - 91)  BP: 130/77 (02-15-23 @ 12:23) (118/67 - 140/92)  RR: 18 (02-15-23 @ 12:23) (18 - 20)  SpO2: 100% (02-15-23 @ 12:23) (94% - 100%)  Wt(kg): --  I&O's Summary      Appearance: Normal	  HEENT:   Normal oral mucosa, PERRL, EOMI	  Lymphatic: No lymphadenopathy  Cardiovascular: Normal S1 S2, No JVD, No murmurs, No edema  Respiratory: Lungs clear to auscultation	  Psychiatry: A & O x 3, Mood & affect appropriate  Gastrointestinal:  Soft, Non-tender, + BS	  Skin: No rashes, No ecchymoses, No cyanosis	  Neurologic: Non-focal  Extremities: Normal range of motion, No clubbing, cyanosis or edema  Vascular: Peripheral pulses palpable 2+ bilaterally    MEDICATIONS  (STANDING):  albuterol/ipratropium for Nebulization 3 milliLiter(s) Nebulizer every 6 hours  anastrozole 1 milliGRAM(s) Oral daily  apixaban 5 milliGRAM(s) Oral every 12 hours  artificial  tears Solution 1 Drop(s) Both EYES two times a day  carvedilol 6.25 milliGRAM(s) Oral every 12 hours  chlorhexidine 2% Cloths 1 Application(s) Topical <User Schedule>  insulin lispro (ADMELOG) corrective regimen sliding scale   SubCutaneous three times a day with meals  insulin lispro (ADMELOG) corrective regimen sliding scale   SubCutaneous at bedtime  pantoprazole    Tablet 40 milliGRAM(s) Oral before breakfast  simvastatin 20 milliGRAM(s) Oral at bedtime    	  	  LABS:	 	                    proBNP: Serum Pro-Brain Natriuretic Peptide: 3916 pg/mL (01-30 @ 20:20)    Lipid Profile:   HgA1c:   TSH:

## 2023-02-15 NOTE — PROGRESS NOTE ADULT - PROBLEM SELECTOR PLAN 2
Exacerbation resolving.  Continue AVAPS nightly and as needed during the day. Oxygen support when not on AVAPS  Remains hypercapnic despite AVAPS usage. PCO2 on AVAPS 57-75. PCO2 on BIPAP 85. Failed BIAP and AVAPS therapy. Patient will need AVAPS-AE/Trilogy at home to manage persistent hypercapnia. Without AVAPS-AE/Trilogy patient is a high risk for readmission and intubation.   Steroids completed.  Continue bronchodilators.  Trilogy approved 2/14

## 2023-02-15 NOTE — PROGRESS NOTE ADULT - PROBLEM SELECTOR PROBLEM 3
HTN (hypertension) Quinolones Counseling:  I discussed with the patient the risks of fluoroquinolones including but not limited to GI upset, allergic reaction, drug rash, diarrhea, dizziness, photosensitivity, yeast infections, liver function test abnormalities, tendonitis/tendon rupture.

## 2023-02-16 LAB
GLUCOSE BLDC GLUCOMTR-MCNC: 114 MG/DL — HIGH (ref 70–99)
GLUCOSE BLDC GLUCOMTR-MCNC: 125 MG/DL — HIGH (ref 70–99)
GLUCOSE BLDC GLUCOMTR-MCNC: 156 MG/DL — HIGH (ref 70–99)
GLUCOSE BLDC GLUCOMTR-MCNC: 203 MG/DL — HIGH (ref 70–99)

## 2023-02-16 PROCEDURE — 99232 SBSQ HOSP IP/OBS MODERATE 35: CPT

## 2023-02-16 RX ADMIN — CARVEDILOL PHOSPHATE 6.25 MILLIGRAM(S): 80 CAPSULE, EXTENDED RELEASE ORAL at 05:45

## 2023-02-16 RX ADMIN — Medication 1: at 17:25

## 2023-02-16 RX ADMIN — Medication 3 MILLILITER(S): at 20:14

## 2023-02-16 RX ADMIN — Medication 1 DROP(S): at 17:44

## 2023-02-16 RX ADMIN — APIXABAN 5 MILLIGRAM(S): 2.5 TABLET, FILM COATED ORAL at 05:45

## 2023-02-16 RX ADMIN — SIMVASTATIN 20 MILLIGRAM(S): 20 TABLET, FILM COATED ORAL at 22:18

## 2023-02-16 RX ADMIN — CARVEDILOL PHOSPHATE 6.25 MILLIGRAM(S): 80 CAPSULE, EXTENDED RELEASE ORAL at 17:43

## 2023-02-16 RX ADMIN — ANASTROZOLE 1 MILLIGRAM(S): 1 TABLET ORAL at 11:39

## 2023-02-16 RX ADMIN — CHLORHEXIDINE GLUCONATE 1 APPLICATION(S): 213 SOLUTION TOPICAL at 05:45

## 2023-02-16 RX ADMIN — Medication 2: at 11:48

## 2023-02-16 RX ADMIN — Medication 1 DROP(S): at 05:45

## 2023-02-16 RX ADMIN — Medication 3 MILLILITER(S): at 15:34

## 2023-02-16 RX ADMIN — PANTOPRAZOLE SODIUM 40 MILLIGRAM(S): 20 TABLET, DELAYED RELEASE ORAL at 06:14

## 2023-02-16 RX ADMIN — Medication 3 MILLILITER(S): at 02:59

## 2023-02-16 RX ADMIN — APIXABAN 5 MILLIGRAM(S): 2.5 TABLET, FILM COATED ORAL at 17:43

## 2023-02-16 RX ADMIN — Medication 3 MILLILITER(S): at 08:56

## 2023-02-16 NOTE — PROGRESS NOTE ADULT - PROBLEM SELECTOR PLAN 3
Acute resolved. Diastolic heart failure with mild pulmonary hypertension.  Continue coreg BID.  ECHO July 2022- EF 55-60%, LVH, GIDD, mild pulm HTN  pro-BNP 3916 (prev 1943)  S/p diuresis in ICU. Completed diuretics  Daily weight   Dr Siu following. Acute resolved. Diastolic heart failure with mild pulmonary hypertension.  Continue coreg BID.  ECHO July 2022- EF 55-60%, LVH, GIDD, mild pulm HTN  pro-BNP 3916 (prev 1943)  S/p diuresis in ICU. Completed diuretics  Daily weight   Dr Siu following - no change in tx plan

## 2023-02-16 NOTE — PROGRESS NOTE ADULT - SUBJECTIVE AND OBJECTIVE BOX
Patient was seen and examined  Patient is a 89y old  Female who presents with a chief complaint of AHRF (15 Feb 2023 13:05)      INTERVAL HPI/OVERNIGHT EVENTS:  T(C): 36.4 (02-16-23 @ 05:15), Max: 36.6 (02-15-23 @ 12:23)  HR: 71 (02-16-23 @ 05:15) (71 - 93)  BP: 117/86 (02-16-23 @ 05:15) (117/86 - 133/81)  RR: 19 (02-16-23 @ 05:15) (17 - 20)  SpO2: 100% (02-16-23 @ 05:15) (94% - 100%)  Wt(kg): --  I&O's Summary      LABS:              CAPILLARY BLOOD GLUCOSE      POCT Blood Glucose.: 125 mg/dL (16 Feb 2023 07:54)  POCT Blood Glucose.: 128 mg/dL (15 Feb 2023 21:54)  POCT Blood Glucose.: 100 mg/dL (15 Feb 2023 16:42)  POCT Blood Glucose.: 254 mg/dL (15 Feb 2023 12:06)              MEDICATIONS  (STANDING):  albuterol/ipratropium for Nebulization 3 milliLiter(s) Nebulizer every 6 hours  anastrozole 1 milliGRAM(s) Oral daily  apixaban 5 milliGRAM(s) Oral every 12 hours  artificial  tears Solution 1 Drop(s) Both EYES two times a day  carvedilol 6.25 milliGRAM(s) Oral every 12 hours  chlorhexidine 2% Cloths 1 Application(s) Topical <User Schedule>  insulin lispro (ADMELOG) corrective regimen sliding scale   SubCutaneous three times a day with meals  insulin lispro (ADMELOG) corrective regimen sliding scale   SubCutaneous at bedtime  pantoprazole    Tablet 40 milliGRAM(s) Oral before breakfast  simvastatin 20 milliGRAM(s) Oral at bedtime    MEDICATIONS  (PRN):  guaiFENesin Oral Liquid (Sugar-Free) 200 milliGRAM(s) Oral every 6 hours PRN Cough      RADIOLOGY & ADDITIONAL TESTS:    Imaging Personally Reviewed:  [ ] YES  [ ] NO    REVIEW OF SYSTEMS:  CONSTITUTIONAL: No fever, weight loss, or fatigue  EYES: No eye pain, visual disturbances, or discharge  ENMT:  No difficulty hearing, tinnitus, vertigo; No sinus or throat pain  NECK: No pain or stiffness  BREASTS: No pain, masses, or nipple discharge  RESPIRATORY: No cough, wheezing, chills or hemoptysis; No shortness of breath  CARDIOVASCULAR: No chest pain, palpitations, dizziness, or leg swelling  GASTROINTESTINAL: No abdominal or epigastric pain. No nausea, vomiting, or hematemesis; No diarrhea or constipation. No melena or hematochezia.  GENITOURINARY: No dysuria, frequency, hematuria, or incontinence  NEUROLOGICAL: No headaches, memory loss, loss of strength, numbness, or tremors  SKIN: No itching, burning, rashes, or lesions   LYMPH NODES: No enlarged glands  ENDOCRINE: No heat or cold intolerance; No hair loss  MUSCULOSKELETAL: No joint pain or swelling; No muscle, back, or extremity pain  PSYCHIATRIC: No depression, anxiety, mood swings, or difficulty sleeping  HEME/LYMPH: No easy bruising, or bleeding gums  ALLERY AND IMMUNOLOGIC: No hives or eczema      Consultant(s) Notes Reviewed:  [ x ] YES  [ ] NO    PHYSICAL EXAM:  GENERAL: NAD, well-groomed, well-developed  HEAD:  Atraumatic, Normocephalic  EYES: EOMI, PERRLA, conjunctiva and sclera clear  ENMT: No tonsillar erythema, exudates, or enlargement; Moist mucous membranes, Good dentition, No lesions  NECK: Supple, No JVD, Normal thyroid  NERVOUS SYSTEM:  Alert & Oriented X3, Good concentration; Motor Strength 5/5 B/L upper and lower extremities; DTRs 2+ intact and symmetric  CHEST/LUNG: Clear to percussion bilaterally; No rales, rhonchi, wheezing, or rubs  HEART: Regular rate and rhythm; No murmurs, rubs, or gallops  ABDOMEN: Soft, Nontender, Nondistended; Bowel sounds present  EXTREMITIES:  2+ Peripheral Pulses, No clubbing, cyanosis, or edema  LYMPH: No lymphadenopathy noted  SKIN: No rashes or lesions    Care Discussed with Consultants/Other Providers [ x] YES  [ ] NO

## 2023-02-16 NOTE — PROGRESS NOTE ADULT - ASSESSMENT
89 yo F, from home, with PMHx HTN, COPD, Afib, HFpEF (7/29/22 EF 55-60%, LVH, GIDD, Breast CA, and remote h/o LLE DVT p/w SOB and hypoxia to high 70s, admitted to medicine for AHRF 2/2 Acute diastolic CHF +/- COPD exacerbation and UTI (completed treatment). ICU consulted for concerns of AMS in the setting of Acute on chronic compensated respiratory acidosis requiring new trial of Bipap.  Patient downgraded to AI for further management. Currently on 2L NC. Will need BiPAP Q HS.   2/5 Change to AVAPS setting qHS.   02/08: patient medically optimized for discharge. Plan to d/c to home with AVAPS. On 2L NC and tolerating well. Awaiting for AVAPS machine. CM following.   02/09: Patient optimized medically and plan to home with family. Awaiting AVAPS. Has home oxygen  2/14  Trilogy approved.  dc planing today

## 2023-02-16 NOTE — PROGRESS NOTE ADULT - ASSESSMENT
89 yo F, from home, with PMHx HTN, COPD, Afib, HFpEF (7/29/22 EF 55-60%, LVH, GIDD, Breast CA, and remote h/o LLE DVT p/w SOB and hypoxia to high 70s, admitted to medicine for AHRF 2/2 Acute diastolic CHF +/- COPD exacerbation and UTI (completed treatment). ICU consulted for concerns of AMS in the setting of Acute on chronic compensated respiratory acidosis requiring new trial of Bipap.  Patient downgraded to AI for further management. Currently on 2L NC. Will need BiPAP Q HS.   2/5 Change to AVAPS setting qHS.   02/08: patient medically optimized for discharge. Plan to d/c to home with AVAPS. On 2L NC and tolerating well. Awaiting for AVAPS machine. CM following.   02/09: Patient optimized medically and plan to home with family. Awaiting AVAPS. Has home oxygen  2/14  Trilogy approved. Will be set up and delivered tomorrow.     89 yo F, from home, with PMHx HTN, COPD, Afib, HFpEF (7/29/22 EF 55-60%, LVH, GIDD, Breast CA, and remote h/o LLE DVT p/w SOB and hypoxia to high 70s, admitted to medicine for AHRF 2/2 Acute diastolic CHF +/- COPD exacerbation and UTI (completed treatment). ICU consulted for concerns of AMS in the setting of Acute on chronic compensated respiratory acidosis requiring new trial of Bipap.  Patient downgraded to AI for further management. Currently on 2L NC. Will need BiPAP Q HS.   2/5 Change to AVAPS setting qHS.   02/08: patient medically optimized for discharge. Plan to d/c to home with AVAPS. On 2L NC and tolerating well. Awaiting for AVAPS machine. CM following.   02/09: Patient optimized medically and plan to home with family. Awaiting AVAPS. Has home oxygen  2/14  Trilogy approved. pending Auth  2/16 - still pending Auth for Trilogy per CM, CM to f/u. Pt is medically optimized  for discharge

## 2023-02-16 NOTE — PROGRESS NOTE ADULT - SUBJECTIVE AND OBJECTIVE BOX
CHIEF COMPLAINT:Patient is a 89y old  Female who presents with a chief complaint of AHRF.Pt appears comfortable.    	  REVIEW OF SYSTEMS:  CONSTITUTIONAL: No fever, weight loss, or fatigue  EYES: No eye pain, visual disturbances, or discharge  ENT:  No difficulty hearing, tinnitus, vertigo; No sinus or throat pain  NECK: No pain or stiffness  RESPIRATORY: No cough, wheezing, chills or hemoptysis; No Shortness of Breath  CARDIOVASCULAR: No chest pain, palpitations, passing out, dizziness, or leg swelling  GASTROINTESTINAL: No abdominal or epigastric pain. No nausea, vomiting, or hematemesis; No diarrhea or constipation. No melena or hematochezia.  GENITOURINARY: No dysuria, frequency, hematuria, or incontinence  NEUROLOGICAL: No headaches, memory loss, loss of strength, numbness, or tremors  SKIN: No itching, burning, rashes, or lesions   LYMPH Nodes: No enlarged glands  ENDOCRINE: No heat or cold intolerance; No hair loss  MUSCULOSKELETAL: No joint pain or swelling; No muscle, back, or extremity pain  PSYCHIATRIC: No depression, anxiety, mood swings, or difficulty sleeping  HEME/LYMPH: No easy bruising, or bleeding gums  ALLERGY AND IMMUNOLOGIC: No hives or eczema	      PHYSICAL EXAM:  T(C): 36.4 (02-16-23 @ 05:15), Max: 36.4 (02-16-23 @ 05:15)  HR: 71 (02-16-23 @ 05:15) (71 - 93)  BP: 117/86 (02-16-23 @ 05:15) (117/86 - 133/81)  RR: 19 (02-16-23 @ 05:15) (17 - 20)  SpO2: 100% (02-16-23 @ 05:15) (99% - 100%)  Wt(kg): --  I&O's Summary      Appearance: Normal	  HEENT:   Normal oral mucosa, PERRL, EOMI	  Lymphatic: No lymphadenopathy  Cardiovascular: Normal S1 S2, No JVD, No murmurs, No edema  Respiratory: Lungs clear to auscultation	  Psychiatry: A & O x 3, Mood & affect appropriate  Gastrointestinal:  Soft, Non-tender, + BS	  Skin: No rashes, No ecchymoses, No cyanosis	  Neurologic: Non-focal  Extremities: Normal range of motion, No clubbing, cyanosis or edema  Vascular: Peripheral pulses palpable 2+ bilaterally    MEDICATIONS  (STANDING):  albuterol/ipratropium for Nebulization 3 milliLiter(s) Nebulizer every 6 hours  anastrozole 1 milliGRAM(s) Oral daily  apixaban 5 milliGRAM(s) Oral every 12 hours  artificial  tears Solution 1 Drop(s) Both EYES two times a day  carvedilol 6.25 milliGRAM(s) Oral every 12 hours  chlorhexidine 2% Cloths 1 Application(s) Topical <User Schedule>  insulin lispro (ADMELOG) corrective regimen sliding scale   SubCutaneous three times a day with meals  insulin lispro (ADMELOG) corrective regimen sliding scale   SubCutaneous at bedtime  pantoprazole    Tablet 40 milliGRAM(s) Oral before breakfast  simvastatin 20 milliGRAM(s) Oral at bedtime      LABS:	 	        proBNP: Serum Pro-Brain Natriuretic Peptide: 3916 pg/mL (01-30 @ 20:20)

## 2023-02-16 NOTE — PROGRESS NOTE ADULT - SUBJECTIVE AND OBJECTIVE BOX
DNR [ ]   DNI  [  ]    INTERVAL HPI/OVERNIGHT EVENTS: ***    PRESSORS: [ ] YES [ ] NO  WHICH:    ANTIBIOTICS:                  DATE STARTED:  ANTIBIOTICS:                  DATE STARTED:  ANTIBIOTICS:                  DATE STARTED:      Cardiovascular:  Heart Failure  Acute   Acute on Chronic  Chronic       carvedilol 6.25 milliGRAM(s) Oral every 12 hours    Pulmonary:  albuterol/ipratropium for Nebulization 3 milliLiter(s) Nebulizer every 6 hours  guaiFENesin Oral Liquid (Sugar-Free) 200 milliGRAM(s) Oral every 6 hours PRN    Hematalogic:  apixaban 5 milliGRAM(s) Oral every 12 hours    Other:  anastrozole 1 milliGRAM(s) Oral daily  artificial  tears Solution 1 Drop(s) Both EYES two times a day  chlorhexidine 2% Cloths 1 Application(s) Topical <User Schedule>  insulin lispro (ADMELOG) corrective regimen sliding scale   SubCutaneous three times a day with meals  insulin lispro (ADMELOG) corrective regimen sliding scale   SubCutaneous at bedtime  pantoprazole    Tablet 40 milliGRAM(s) Oral before breakfast  simvastatin 20 milliGRAM(s) Oral at bedtime    albuterol/ipratropium for Nebulization 3 milliLiter(s) Nebulizer every 6 hours  anastrozole 1 milliGRAM(s) Oral daily  apixaban 5 milliGRAM(s) Oral every 12 hours  artificial  tears Solution 1 Drop(s) Both EYES two times a day  carvedilol 6.25 milliGRAM(s) Oral every 12 hours  chlorhexidine 2% Cloths 1 Application(s) Topical <User Schedule>  guaiFENesin Oral Liquid (Sugar-Free) 200 milliGRAM(s) Oral every 6 hours PRN  insulin lispro (ADMELOG) corrective regimen sliding scale   SubCutaneous three times a day with meals  insulin lispro (ADMELOG) corrective regimen sliding scale   SubCutaneous at bedtime  pantoprazole    Tablet 40 milliGRAM(s) Oral before breakfast  simvastatin 20 milliGRAM(s) Oral at bedtime    Drug Dosing Weight  Height (cm): 154.9 (30 Jan 2023 16:23)  Weight (kg): 120 (03 Feb 2023 15:15)  BMI (kg/m2): 50 (03 Feb 2023 15:15)  BSA (m2): 2.13 (03 Feb 2023 15:15)    CENTRAL LINE: [ ] YES [ ] NO  LOCATION:   DATE INSERTED:  REMOVE: [ ] YES [ ] NO  EXPLAIN:    AGUIRRE: [ ] YES [ ] NO    DATE INSERTED:  REMOVE:  [ ] YES [ ] NO  EXPLAIN:    A-LINE:  [ ] YES [ ] NO  LOCATION:   DATE INSERTED:  REMOVE:  [ ] YES [ ] NO  EXPLAIN:    PAST MEDICAL & SURGICAL HISTORY:  Arthritis      Legally blind      Pre-diabetes      Breast cancer  right, no chemo or radiation      COPD exacerbation      Atrial fibrillation      HF (heart failure)  HFpEF 7/29      HTN (hypertension)      Deep vein thrombosis (DVT)  Left Lower Extremity      H/O CHF      No significant past surgical history                        PHYSICAL EXAM:    GENERAL: NAD, well-groomed, well-developed  HEAD:  Atraumatic, Normocephalic  EYES: EOMI, PERRLA, conjunctiva and sclera clear  ENMT: No tonsillar erythema, exudates, or enlargement; Moist mucous membranes, Good dentition, No lesions  NECK: Supple, No JVD, Normal thyroid  NERVOUS SYSTEM:  Alert & Oriented X3, Good concentration; Motor Strength 5/5 B/L upper and lower extremities; DTRs 2+ intact and symmetric  CHEST/LUNG: Clear to percussion bilaterally; No rales, rhonchi, wheezing, or rubs  HEART: Regular rate and rhythm; No murmurs, rubs, or gallops  ABDOMEN: Soft, Nontender, Nondistended; Bowel sounds present  EXTREMITIES:  2+ Peripheral Pulses, No clubbing, cyanosis, or edema  LYMPH: No lymphadenopathy noted  SKIN: No rashes or lesions      LABS:                    [  ]  DVT Prophylaxis  [  ]  Nutrition, Brand, Rate         Goal Rate         Abdominal Nutritional Status -  Malnutrition   Cachexia      Morbid Obesity BMI >/=40    RADIOLOGY & ADDITIONAL STUDIES:  ***    [  ] Goals of Care Discussion with Family/Proxy/Other           Elements of Conversation Discussed: Patient/Family understanding of current illness   Advanced Directives                                                                       Prognosis  Treatment Options  Care Aligned with patient's wishes                                             TIME SPENT: 35 minutes Full code    INTERVAL HPI/OVERNIGHT EVENTS: reports she feels well and wanting to go home     PRESSORS: [ ] YES [x ] NO  WHICH:    ANTIBIOTICS:                  DATE STARTED:  ANTIBIOTICS:                  DATE STARTED:  ANTIBIOTICS:                  DATE STARTED:      Cardiovascular:  Heart Failure  Acute   Acute on Chronic  Chronic       carvedilol 6.25 milliGRAM(s) Oral every 12 hours    Pulmonary:  albuterol/ipratropium for Nebulization 3 milliLiter(s) Nebulizer every 6 hours  guaiFENesin Oral Liquid (Sugar-Free) 200 milliGRAM(s) Oral every 6 hours PRN    Hematalogic:  apixaban 5 milliGRAM(s) Oral every 12 hours    Other:  anastrozole 1 milliGRAM(s) Oral daily  artificial  tears Solution 1 Drop(s) Both EYES two times a day  chlorhexidine 2% Cloths 1 Application(s) Topical <User Schedule>  insulin lispro (ADMELOG) corrective regimen sliding scale   SubCutaneous three times a day with meals  insulin lispro (ADMELOG) corrective regimen sliding scale   SubCutaneous at bedtime  pantoprazole    Tablet 40 milliGRAM(s) Oral before breakfast  simvastatin 20 milliGRAM(s) Oral at bedtime    albuterol/ipratropium for Nebulization 3 milliLiter(s) Nebulizer every 6 hours  anastrozole 1 milliGRAM(s) Oral daily  apixaban 5 milliGRAM(s) Oral every 12 hours  artificial  tears Solution 1 Drop(s) Both EYES two times a day  carvedilol 6.25 milliGRAM(s) Oral every 12 hours  chlorhexidine 2% Cloths 1 Application(s) Topical <User Schedule>  guaiFENesin Oral Liquid (Sugar-Free) 200 milliGRAM(s) Oral every 6 hours PRN  insulin lispro (ADMELOG) corrective regimen sliding scale   SubCutaneous three times a day with meals  insulin lispro (ADMELOG) corrective regimen sliding scale   SubCutaneous at bedtime  pantoprazole    Tablet 40 milliGRAM(s) Oral before breakfast  simvastatin 20 milliGRAM(s) Oral at bedtime    Drug Dosing Weight  Height (cm): 154.9 (30 Jan 2023 16:23)  Weight (kg): 120 (03 Feb 2023 15:15)  BMI (kg/m2): 50 (03 Feb 2023 15:15)  BSA (m2): 2.13 (03 Feb 2023 15:15)    CENTRAL LINE: [ ] YES [ ] NO  LOCATION:   DATE INSERTED:  REMOVE: [ ] YES [ ] NO  EXPLAIN:    AGUIRRE: [ ] YES [ ] NO    DATE INSERTED:  REMOVE:  [ ] YES [ ] NO  EXPLAIN:    A-LINE:  [ ] YES [ ] NO  LOCATION:   DATE INSERTED:  REMOVE:  [ ] YES [ ] NO  EXPLAIN:    PAST MEDICAL & SURGICAL HISTORY:  Arthritis      Legally blind      Pre-diabetes      Breast cancer  right, no chemo or radiation      COPD exacerbation      Atrial fibrillation      HF (heart failure)  HFpEF 7/29      HTN (hypertension)      Deep vein thrombosis (DVT)  Left Lower Extremity      H/O CHF      No significant past surgical history                        PHYSICAL EXAM:    GENERAL: NAD, well-groomed, well-developed  HEAD:  Atraumatic, Normocephalic  EYES: EOMI, PERRLA, conjunctiva and sclera clear  ENMT: No tonsillar erythema, exudates, or enlargement; Moist mucous membranes, Good dentition, No lesions  NECK: Supple, No JVD, Normal thyroid  NERVOUS SYSTEM:  Alert & Oriented X3, Good concentration; Motor Strength 5/5 B/L upper and lower extremities; DTRs 2+ intact and symmetric  CHEST/LUNG: Clear to percussion bilaterally; No rales, rhonchi, wheezing, or rubs on 2L/min via N-C   HEART: Regular rate and rhythm; No murmurs, rubs, or gallops  ABDOMEN: Soft, Nontender, Nondistended; Bowel sounds present  EXTREMITIES:  2+ Peripheral Pulses, No clubbing, cyanosis, or edema  LYMPH: No lymphadenopathy noted  SKIN: No rashes or lesions      LABS:                    [  ]  DVT Prophylaxis  [  ]  Nutrition, Brand, Rate         Goal Rate         Abdominal Nutritional Status -  Malnutrition   Cachexia      Morbid Obesity BMI >/=40    RADIOLOGY & ADDITIONAL STUDIES:  ***    [  ] Goals of Care Discussion with Family/Proxy/Other           Elements of Conversation Discussed: Patient/Family understanding of current illness   Advanced Directives                                                                       Prognosis  Treatment Options  Care Aligned with patient's wishes                                             TIME SPENT: 35 minutes

## 2023-02-16 NOTE — PROGRESS NOTE ADULT - PROBLEM SELECTOR PLAN 1
Acute on chronic respiratory acidosis, COPD  and  Acute diastolic heart failure.   Acute resolved.  Completed steroids.  Continue bronchodilators.  Continue AVAPS nightly and as needed during the day. AVAPS-AE ordered 2/7/23 for home use  Remains hypercapnic on AVAPS PCO2 57. PCO2 on admission on BIPAP 84.  Failed BIPAP and AVAPS therapy.  Will need Trilogy/AVAPS-AE upon discharge. Without AVAPS-AE patient is a high high for readmission and intubation secondary to persistent hypercapnia. Second admission for respiratory distress since October 2022. Patient also has spinal deformities which is contributing to her hypercapnia.  Trilogy approved 2/14 Acute on chronic respiratory acidosis, COPD  and  Acute diastolic heart failure.   Acute resolved.  Completed steroids.  Continue bronchodilators.  Continue AVAPS nightly and as needed during the day. AVAPS-AE ordered 2/7/23 for home use  Remains hypercapnic on AVAPS PCO2 57. PCO2 on admission on BIPAP 84.  Failed BIPAP and AVAPS therapy.  Will need Trilogy/AVAPS-AE upon discharge. Without AVAPS-AE patient is a high for readmission and intubation secondary to persistent hypercapnia. Second admission for respiratory distress since October 2022. Patient also has spinal deformities which is contributing to her hypercapnia.  Trilogy approved 2/14 - pending Auth and delivery, CM is following

## 2023-02-16 NOTE — PROGRESS NOTE ADULT - PROBLEM SELECTOR PLAN 2
Exacerbation resolving.  Continue AVAPS nightly and as needed during the day. Oxygen support when not on AVAPS  Remains hypercapnic despite AVAPS usage. PCO2 on AVAPS 57-75. PCO2 on BIPAP 85. Failed BIAP and AVAPS therapy. Patient will need AVAPS-AE/Trilogy at home to manage persistent hypercapnia. Without AVAPS-AE/Trilogy patient is a high risk for readmission and intubation.   Steroids completed.  Continue bronchodilators.  Trilogy approved 2/14 Exacerbation resolving.  Continue AVAPS nightly and as needed during the day. Oxygen support when not on AVAPS  Remains hypercapnic despite AVAPS usage. PCO2 on AVAPS 57-75. PCO2 on BIPAP 85. Failed BIAP and AVAPS therapy. Patient will need AVAPS-AE/Trilogy at home to manage persistent hypercapnia. Without AVAPS-AE/Trilogy patient is a high risk for readmission and intubation.   Steroids completed.  Continue bronchodilators.  Trilogy approved 2/14 - pending auth and delivery

## 2023-02-16 NOTE — PROGRESS NOTE ADULT - SUBJECTIVE AND OBJECTIVE BOX
Patient is a 89y old  Female who presents with a chief complaint of AHRF (16 Feb 2023 09:59)  Awake, alert, clinically stable and resting in bed on oxygen supp via NC in NAD    INTERVAL HPI/OVERNIGHT EVENTS:      VITAL SIGNS:  T(F): 97.5 (02-16-23 @ 05:15)  HR: 71 (02-16-23 @ 05:15)  BP: 117/86 (02-16-23 @ 05:15)  RR: 19 (02-16-23 @ 05:15)  SpO2: 100% (02-16-23 @ 05:15)  Wt(kg): --  I&O's Detail          REVIEW OF SYSTEMS:    CONSTITUTIONAL:  No fevers, chills, sweats    HEENT:  Eyes:  No diplopia or blurred vision. ENT:  No earache, sore throat or runny nose.    CARDIOVASCULAR:  No pressure, squeezing, tightness, or heaviness about the chest; no palpitations.    RESPIRATORY:  Per HPI    GASTROINTESTINAL:  No abdominal pain, nausea, vomiting or diarrhea.    GENITOURINARY:  No dysuria, frequency or urgency.    NEUROLOGIC:  No paresthesias, fasciculations, seizures or weakness.    PSYCHIATRIC:  No disorder of thought or mood.      PHYSICAL EXAM:    Constitutional: Well developed and nourished  Eyes:Perrla  ENMT: normal  Neck:supple  Respiratory: good air entry  Cardiovascular: S1 S2 regular  Gastrointestinal: Soft, Non tender  Extremities: No edema  Vascular:normal  Neurological:Awake, alert,Ox3  Musculoskeletal:Normal      MEDICATIONS  (STANDING):  albuterol/ipratropium for Nebulization 3 milliLiter(s) Nebulizer every 6 hours  anastrozole 1 milliGRAM(s) Oral daily  apixaban 5 milliGRAM(s) Oral every 12 hours  artificial  tears Solution 1 Drop(s) Both EYES two times a day  carvedilol 6.25 milliGRAM(s) Oral every 12 hours  chlorhexidine 2% Cloths 1 Application(s) Topical <User Schedule>  insulin lispro (ADMELOG) corrective regimen sliding scale   SubCutaneous three times a day with meals  insulin lispro (ADMELOG) corrective regimen sliding scale   SubCutaneous at bedtime  pantoprazole    Tablet 40 milliGRAM(s) Oral before breakfast  simvastatin 20 milliGRAM(s) Oral at bedtime    MEDICATIONS  (PRN):  guaiFENesin Oral Liquid (Sugar-Free) 200 milliGRAM(s) Oral every 6 hours PRN Cough      Allergies    losartan (Angioedema)    Intolerances    Chicken (Stomach Upset)      LABS:                    CAPILLARY BLOOD GLUCOSE      POCT Blood Glucose.: 125 mg/dL (16 Feb 2023 07:54)  POCT Blood Glucose.: 128 mg/dL (15 Feb 2023 21:54)  POCT Blood Glucose.: 100 mg/dL (15 Feb 2023 16:42)  POCT Blood Glucose.: 254 mg/dL (15 Feb 2023 12:06)        RADIOLOGY & ADDITIONAL TESTS:    CXR:    Ct scan chest:    ekg;    echo:

## 2023-02-16 NOTE — PROGRESS NOTE ADULT - PROBLEM SELECTOR PLAN 9
DVT and GI prophylaxis.  Failed BIPAP and AVAPS therapy. Remained hypercapnic on both. PCO2 57-84. Will need Trilogy/AVAPS-AE at home to adequately manage persistent hypercapnia. Without Trilogy/AVAPS-AE patient will remain hypercapnic increasing the possibility of readmission and intubation.  Continue bronchodilators. Steroids completed.  Has home oxygen.  Trilogy ordered 2/07/23.  Approved 2/14  Awaiting final auth.  Medically stable for discharge once Trilogy is delivered. Machine approved. Still waiting for final auth. DVT and GI prophylaxis.  Failed BIPAP and AVAPS therapy. Remained hypercapnic on both. PCO2 57-84. Will need Trilogy/AVAPS-AE at home to adequately manage persistent hypercapnia. Without Trilogy/AVAPS-AE patient will remain hypercapnic increasing the possibility of readmission and intubation.  Continue bronchodilators. Steroids completed.  Has home oxygen.  Trilogy ordered 2/07/23.  Approved 2/14  Awaiting final auth.  Medically stable for discharge once Trilogy is delivered. Machine approved. Still waiting for final auth.  spoke with granddaughter - aware of situation, all questions answered

## 2023-02-17 LAB
ANION GAP SERPL CALC-SCNC: 5 MMOL/L — SIGNIFICANT CHANGE UP (ref 5–17)
BUN SERPL-MCNC: 7 MG/DL — SIGNIFICANT CHANGE UP (ref 7–18)
CALCIUM SERPL-MCNC: 8.7 MG/DL — SIGNIFICANT CHANGE UP (ref 8.4–10.5)
CHLORIDE SERPL-SCNC: 104 MMOL/L — SIGNIFICANT CHANGE UP (ref 96–108)
CO2 SERPL-SCNC: 34 MMOL/L — HIGH (ref 22–31)
CREAT SERPL-MCNC: 0.63 MG/DL — SIGNIFICANT CHANGE UP (ref 0.5–1.3)
EGFR: 84 ML/MIN/1.73M2 — SIGNIFICANT CHANGE UP
GLUCOSE BLDC GLUCOMTR-MCNC: 112 MG/DL — HIGH (ref 70–99)
GLUCOSE BLDC GLUCOMTR-MCNC: 121 MG/DL — HIGH (ref 70–99)
GLUCOSE BLDC GLUCOMTR-MCNC: 125 MG/DL — HIGH (ref 70–99)
GLUCOSE BLDC GLUCOMTR-MCNC: 182 MG/DL — HIGH (ref 70–99)
GLUCOSE SERPL-MCNC: 133 MG/DL — HIGH (ref 70–99)
HCT VFR BLD CALC: 42.6 % — SIGNIFICANT CHANGE UP (ref 34.5–45)
HGB BLD-MCNC: 13 G/DL — SIGNIFICANT CHANGE UP (ref 11.5–15.5)
MCHC RBC-ENTMCNC: 30.5 GM/DL — LOW (ref 32–36)
MCHC RBC-ENTMCNC: 30.7 PG — SIGNIFICANT CHANGE UP (ref 27–34)
MCV RBC AUTO: 100.7 FL — HIGH (ref 80–100)
NRBC # BLD: 0 /100 WBCS — SIGNIFICANT CHANGE UP (ref 0–0)
PLATELET # BLD AUTO: 203 K/UL — SIGNIFICANT CHANGE UP (ref 150–400)
POTASSIUM SERPL-MCNC: 4.2 MMOL/L — SIGNIFICANT CHANGE UP (ref 3.5–5.3)
POTASSIUM SERPL-SCNC: 4.2 MMOL/L — SIGNIFICANT CHANGE UP (ref 3.5–5.3)
RBC # BLD: 4.23 M/UL — SIGNIFICANT CHANGE UP (ref 3.8–5.2)
RBC # FLD: 13.3 % — SIGNIFICANT CHANGE UP (ref 10.3–14.5)
SODIUM SERPL-SCNC: 143 MMOL/L — SIGNIFICANT CHANGE UP (ref 135–145)
WBC # BLD: 6.01 K/UL — SIGNIFICANT CHANGE UP (ref 3.8–10.5)
WBC # FLD AUTO: 6.01 K/UL — SIGNIFICANT CHANGE UP (ref 3.8–10.5)

## 2023-02-17 PROCEDURE — 99232 SBSQ HOSP IP/OBS MODERATE 35: CPT

## 2023-02-17 RX ADMIN — APIXABAN 5 MILLIGRAM(S): 2.5 TABLET, FILM COATED ORAL at 17:32

## 2023-02-17 RX ADMIN — CHLORHEXIDINE GLUCONATE 1 APPLICATION(S): 213 SOLUTION TOPICAL at 05:34

## 2023-02-17 RX ADMIN — Medication 3 MILLILITER(S): at 08:12

## 2023-02-17 RX ADMIN — Medication 3 MILLILITER(S): at 03:03

## 2023-02-17 RX ADMIN — SIMVASTATIN 20 MILLIGRAM(S): 20 TABLET, FILM COATED ORAL at 21:07

## 2023-02-17 RX ADMIN — Medication 1: at 11:59

## 2023-02-17 RX ADMIN — PANTOPRAZOLE SODIUM 40 MILLIGRAM(S): 20 TABLET, DELAYED RELEASE ORAL at 05:34

## 2023-02-17 RX ADMIN — Medication 1 DROP(S): at 17:34

## 2023-02-17 RX ADMIN — CARVEDILOL PHOSPHATE 6.25 MILLIGRAM(S): 80 CAPSULE, EXTENDED RELEASE ORAL at 05:34

## 2023-02-17 RX ADMIN — Medication 3 MILLILITER(S): at 20:29

## 2023-02-17 RX ADMIN — CARVEDILOL PHOSPHATE 6.25 MILLIGRAM(S): 80 CAPSULE, EXTENDED RELEASE ORAL at 17:32

## 2023-02-17 RX ADMIN — ANASTROZOLE 1 MILLIGRAM(S): 1 TABLET ORAL at 11:05

## 2023-02-17 RX ADMIN — Medication 1 DROP(S): at 05:34

## 2023-02-17 RX ADMIN — Medication 3 MILLILITER(S): at 14:16

## 2023-02-17 RX ADMIN — APIXABAN 5 MILLIGRAM(S): 2.5 TABLET, FILM COATED ORAL at 05:34

## 2023-02-17 NOTE — PROGRESS NOTE ADULT - PROBLEM SELECTOR PLAN 9
DVT and GI prophylaxis.  Failed BIPAP and AVAPS therapy. Remained hypercapnic on both. PCO2 57-84. Will need Trilogy/AVAPS-AE at home to adequately manage persistent hypercapnia. Without Trilogy/AVAPS-AE patient will remain hypercapnic increasing the possibility of readmission and intubation.  Continue bronchodilators. Steroids completed.  Has home oxygen.  Trilogy ordered 2/07/23.  Approved 2/14  Awaiting final auth.  Medically stable for discharge once Trilogy is delivered. Machine approved. Still waiting for final auth.  spoke with granddaughter - aware of situation, all questions answered

## 2023-02-17 NOTE — PROGRESS NOTE ADULT - ASSESSMENT
89 yo F, from home, with PMHx HTN, COPD, Afib, HFpEF (7/29/22 EF 55-60%, LVH, GIDD, Breast CA, and remote h/o LLE DVT p/w SOB and hypoxia to high 70s, admitted to medicine for AHRF 2/2 Acute diastolic CHF +/- COPD exacerbation and UTI (completed treatment). ICU consulted for concerns of AMS in the setting of Acute on chronic compensated respiratory acidosis requiring new trial of Bipap.  Patient downgraded to AI for further management. Currently on 2L NC. Will need BiPAP Q HS.   2/5 Change to AVAPS setting qHS.   02/08: patient medically optimized for discharge. Plan to d/c to home with AVAPS. On 2L NC and tolerating well. Awaiting for AVAPS machine. CM following.   02/09: Patient optimized medically and plan to home with family. Awaiting AVAPS. Has home oxygen  2/14  Trilogy approved. pending Auth  2/16 - still pending Auth for Trilogy per CM, CM to f/u. Pt is medically optimized  for discharge

## 2023-02-17 NOTE — PROGRESS NOTE ADULT - PROBLEM SELECTOR PLAN 2
Exacerbation resolving.  Continue AVAPS nightly and as needed during the day. Oxygen support when not on AVAPS  Remains hypercapnic despite AVAPS usage. PCO2 on AVAPS 57-75. PCO2 on BIPAP 85. Failed BIAP and AVAPS therapy. Patient will need AVAPS-AE/Trilogy at home to manage persistent hypercapnia. Without AVAPS-AE/Trilogy patient is a high risk for readmission and intubation.   Steroids completed.  Continue bronchodilators.  Trilogy approved 2/14 - pending auth and delivery

## 2023-02-17 NOTE — PROGRESS NOTE ADULT - PROBLEM SELECTOR PLAN 1
Acute on chronic respiratory acidosis, COPD  and  Acute diastolic heart failure.   Acute resolved.  Completed steroids.  Continue bronchodilators.  Continue AVAPS nightly and as needed during the day. AVAPS-AE ordered 2/7/23 for home use  Remains hypercapnic on AVAPS PCO2 57. PCO2 on admission on BIPAP 84.  Failed BIPAP and AVAPS therapy.  Will need Trilogy/AVAPS-AE upon discharge. Without AVAPS-AE patient is a high for readmission and intubation secondary to persistent hypercapnia. Second admission for respiratory distress since October 2022. Patient also has spinal deformities which is contributing to her hypercapnia.  Trilogy approved 2/14 - pending Auth and delivery, CM is following

## 2023-02-17 NOTE — PROGRESS NOTE ADULT - SUBJECTIVE AND OBJECTIVE BOX
Patient is a 89y old  Female who presents with a chief complaint of AHRF (16 Feb 2023 12:23)  Awake, alert, and clinically unchanged from the previous day. Currently awaiting a BIPAP machine in order to be discharged.       INTERVAL HPI/OVERNIGHT EVENTS:      VITAL SIGNS:  T(F): 97.8 (02-17-23 @ 04:10)  HR: 73 (02-17-23 @ 04:10)  BP: 118/65 (02-17-23 @ 04:10)  RR: 18 (02-17-23 @ 04:10)  SpO2: 99% (02-17-23 @ 04:10)  Wt(kg): --  I&O's Detail          REVIEW OF SYSTEMS:    CONSTITUTIONAL:  No fevers, chills, sweats    HEENT:  Eyes:  No diplopia or blurred vision. ENT:  No earache, sore throat or runny nose.    CARDIOVASCULAR:  No pressure, squeezing, tightness, or heaviness about the chest; no palpitations.    RESPIRATORY:  Per HPI    GASTROINTESTINAL:  No abdominal pain, nausea, vomiting or diarrhea.    GENITOURINARY:  No dysuria, frequency or urgency.    NEUROLOGIC:  No paresthesias, fasciculations, seizures or weakness.    PSYCHIATRIC:  No disorder of thought or mood.      PHYSICAL EXAM:    Constitutional: Well developed and nourished  Eyes:Perrla  ENMT: normal  Neck:supple  Respiratory: good air entry  Cardiovascular: S1 S2 regular  Gastrointestinal: Soft, Non tender  Extremities: No edema  Vascular:normal  Neurological:Awake, alert,Ox3  Musculoskeletal:Normal      MEDICATIONS  (STANDING):  albuterol/ipratropium for Nebulization 3 milliLiter(s) Nebulizer every 6 hours  anastrozole 1 milliGRAM(s) Oral daily  apixaban 5 milliGRAM(s) Oral every 12 hours  artificial  tears Solution 1 Drop(s) Both EYES two times a day  carvedilol 6.25 milliGRAM(s) Oral every 12 hours  chlorhexidine 2% Cloths 1 Application(s) Topical <User Schedule>  insulin lispro (ADMELOG) corrective regimen sliding scale   SubCutaneous three times a day with meals  insulin lispro (ADMELOG) corrective regimen sliding scale   SubCutaneous at bedtime  pantoprazole    Tablet 40 milliGRAM(s) Oral before breakfast  simvastatin 20 milliGRAM(s) Oral at bedtime    MEDICATIONS  (PRN):  guaiFENesin Oral Liquid (Sugar-Free) 200 milliGRAM(s) Oral every 6 hours PRN Cough      Allergies    losartan (Angioedema)    Intolerances    Chicken (Stomach Upset)      LABS:                        13.0   6.01  )-----------( 203      ( 17 Feb 2023 07:50 )             42.6     02-17    143  |  104  |  7   ----------------------------<  133<H>  4.2   |  34<H>  |  0.63    Ca    8.7      17 Feb 2023 07:50                CAPILLARY BLOOD GLUCOSE      POCT Blood Glucose.: 125 mg/dL (17 Feb 2023 07:54)  POCT Blood Glucose.: 114 mg/dL (16 Feb 2023 21:57)  POCT Blood Glucose.: 156 mg/dL (16 Feb 2023 16:36)  POCT Blood Glucose.: 203 mg/dL (16 Feb 2023 11:23)        RADIOLOGY & ADDITIONAL TESTS:    CXR:    Ct scan chest:    ekg;    echo:

## 2023-02-17 NOTE — PROGRESS NOTE ADULT - SUBJECTIVE AND OBJECTIVE BOX
CHIEF COMPLAINT:Patient is a 89y old  Female who presents with a chief complaint of AHRF .Pt appears comfortable.    	  REVIEW OF SYSTEMS:  CONSTITUTIONAL: No fever, weight loss, or fatigue  EYES: No eye pain, visual disturbances, or discharge  ENT:  No difficulty hearing, tinnitus, vertigo; No sinus or throat pain  NECK: No pain or stiffness  RESPIRATORY: No cough, wheezing, chills or hemoptysis; No Shortness of Breath  CARDIOVASCULAR: No chest pain, palpitations, passing out, dizziness, or leg swelling  GASTROINTESTINAL: No abdominal or epigastric pain. No nausea, vomiting, or hematemesis; No diarrhea or constipation. No melena or hematochezia.  GENITOURINARY: No dysuria, frequency, hematuria, or incontinence  NEUROLOGICAL: No headaches, memory loss, loss of strength, numbness, or tremors  SKIN: No itching, burning, rashes, or lesions   LYMPH Nodes: No enlarged glands  ENDOCRINE: No heat or cold intolerance; No hair loss  MUSCULOSKELETAL: No joint pain or swelling; No muscle, back, or extremity pain  PSYCHIATRIC: No depression, anxiety, mood swings, or difficulty sleeping  HEME/LYMPH: No easy bruising, or bleeding gums  ALLERGY AND IMMUNOLOGIC: No hives or eczema	      PHYSICAL EXAM:  T(C): 36.7 (02-17-23 @ 13:03), Max: 36.8 (02-16-23 @ 21:38)  HR: 76 (02-17-23 @ 13:03) (73 - 82)  BP: 122/76 (02-17-23 @ 13:03) (118/65 - 130/85)  RR: 18 (02-17-23 @ 13:03) (18 - 20)  SpO2: 98% (02-17-23 @ 13:03) (98% - 100%)  Wt(kg): --  I&O's Summary      Appearance: Normal	  HEENT:   Normal oral mucosa, PERRL, EOMI	  Lymphatic: No lymphadenopathy  Cardiovascular: Normal S1 S2, No JVD, No murmurs, No edema  Respiratory: Lungs clear to auscultation	  Psychiatry: A & O x 3, Mood & affect appropriate  Gastrointestinal:  Soft, Non-tender, + BS	  Skin: No rashes, No ecchymoses, No cyanosis	  Neurologic: Non-focal  Extremities: Normal range of motion, No clubbing, cyanosis or edema  Vascular: Peripheral pulses palpable 2+ bilaterally    MEDICATIONS  (STANDING):  albuterol/ipratropium for Nebulization 3 milliLiter(s) Nebulizer every 6 hours  anastrozole 1 milliGRAM(s) Oral daily  apixaban 5 milliGRAM(s) Oral every 12 hours  artificial  tears Solution 1 Drop(s) Both EYES two times a day  carvedilol 6.25 milliGRAM(s) Oral every 12 hours  chlorhexidine 2% Cloths 1 Application(s) Topical <User Schedule>  insulin lispro (ADMELOG) corrective regimen sliding scale   SubCutaneous three times a day with meals  insulin lispro (ADMELOG) corrective regimen sliding scale   SubCutaneous at bedtime  pantoprazole    Tablet 40 milliGRAM(s) Oral before breakfast  simvastatin 20 milliGRAM(s) Oral at bedtime      	  	  LABS:	 	                          13.0   6.01  )-----------( 203      ( 17 Feb 2023 07:50 )             42.6     02-17    143  |  104  |  7   ----------------------------<  133<H>  4.2   |  34<H>  |  0.63    Ca    8.7      17 Feb 2023 07:50      proBNP: Serum Pro-Brain Natriuretic Peptide: 3916 pg/mL (01-30 @ 20:20)

## 2023-02-17 NOTE — PROGRESS NOTE ADULT - SUBJECTIVE AND OBJECTIVE BOX
NUNO LAST    SCU progress note    INTERVAL HPI/OVERNIGHT EVENTS: ***    DNR [ ]   DNI  [  ]    Covid - 19 PCR:     The 4Ms    What Matters Most: see GOC  Age appropriate Medications/Screen for High Risk Medication: Yes  Mentation: see CAM below  Mobility: defer to physical exam    The Confusion Assessment Method (CAM) Diagnostic Algorithm     1: Acute Onset or Fluctuating Course  - Is there evidence of an acute change in mental status from the patient’s baseline? Did the (abnormal) behavior  fluctuate during the day, that is, tend to come and go, or increase and decrease in severity?  [ ] YES [ ] NO     2: Inattention  - Did the patient have difficulty focusing attention, being easily distractible, or having difficulty keeping track of what was being said?  [ ] YES [ ] NO     3: Disorganized thinking  -Was the patient’s thinking disorganized or incoherent, such as rambling or irrelevant conversation, unclear or illogical flow of ideas, or unpredictable switching from subject to subject?  [ ] YES [ ] NO    4: Altered Level of consciousness?  [ ] YES [ ] NO    The diagnosis of delirium by CAM requires the presence of features 1 and 2 and either 3 or 4.    PRESSORS: [ ] YES [ ] NO    Cardiovascular:  Heart Failure  Acute   Acute on Chronic  Chronic       carvedilol 6.25 milliGRAM(s) Oral every 12 hours    Pulmonary:  albuterol/ipratropium for Nebulization 3 milliLiter(s) Nebulizer every 6 hours  guaiFENesin Oral Liquid (Sugar-Free) 200 milliGRAM(s) Oral every 6 hours PRN    Hematalogic:  apixaban 5 milliGRAM(s) Oral every 12 hours    Other:  anastrozole 1 milliGRAM(s) Oral daily  artificial  tears Solution 1 Drop(s) Both EYES two times a day  chlorhexidine 2% Cloths 1 Application(s) Topical <User Schedule>  insulin lispro (ADMELOG) corrective regimen sliding scale   SubCutaneous three times a day with meals  insulin lispro (ADMELOG) corrective regimen sliding scale   SubCutaneous at bedtime  pantoprazole    Tablet 40 milliGRAM(s) Oral before breakfast  simvastatin 20 milliGRAM(s) Oral at bedtime    albuterol/ipratropium for Nebulization 3 milliLiter(s) Nebulizer every 6 hours  anastrozole 1 milliGRAM(s) Oral daily  apixaban 5 milliGRAM(s) Oral every 12 hours  artificial  tears Solution 1 Drop(s) Both EYES two times a day  carvedilol 6.25 milliGRAM(s) Oral every 12 hours  chlorhexidine 2% Cloths 1 Application(s) Topical <User Schedule>  guaiFENesin Oral Liquid (Sugar-Free) 200 milliGRAM(s) Oral every 6 hours PRN  insulin lispro (ADMELOG) corrective regimen sliding scale   SubCutaneous three times a day with meals  insulin lispro (ADMELOG) corrective regimen sliding scale   SubCutaneous at bedtime  pantoprazole    Tablet 40 milliGRAM(s) Oral before breakfast  simvastatin 20 milliGRAM(s) Oral at bedtime    Drug Dosing Weight  Height (cm): 154.9 (30 Jan 2023 16:23)  Weight (kg): 120 (03 Feb 2023 15:15)  BMI (kg/m2): 50 (03 Feb 2023 15:15)  BSA (m2): 2.13 (03 Feb 2023 15:15)    CENTRAL LINE: [ ] YES [ ] NO  LOCATION:   DATE INSERTED:  REMOVE: [ ] YES [ ] NO  EXPLAIN:    AGUIRRE: [ ] YES [ ] NO    DATE INSERTED:  REMOVE:  [ ] YES [ ] NO  EXPLAIN:    PAST MEDICAL & SURGICAL HISTORY:  Arthritis      Legally blind      Pre-diabetes      Breast cancer  right, no chemo or radiation      COPD exacerbation      Atrial fibrillation      HF (heart failure)  HFpEF 7/29      HTN (hypertension)      Deep vein thrombosis (DVT)  Left Lower Extremity      H/O CHF      No significant past surgical history                        PHYSICAL EXAM:    GENERAL: NAD, well-groomed, well-developed  HEAD:  Atraumatic, Normocephalic  EYES: EOMI, PERRLA, conjunctiva and sclera clear  ENMT: No tonsillar erythema, exudates, or enlargement; Moist mucous membranes, Good dentition, No lesions  NECK: Supple, No JVD, Normal thyroid  NERVOUS SYSTEM:  Alert & Oriented X3, Good concentration; Motor Strength 5/5 B/L upper and lower extremities; DTRs 2+ intact and symmetric  CHEST/LUNG: Clear to percussion bilaterally; No rales, rhonchi, wheezing, or rubs  HEART: Regular rate and rhythm; No murmurs, rubs, or gallops  ABDOMEN: Soft, Nontender, Nondistended; Bowel sounds present  EXTREMITIES:  2+ Peripheral Pulses, No clubbing, cyanosis, or edema  LYMPH: No lymphadenopathy noted  SKIN: No rashes or lesions      LABS:  CBC Full  -  ( 17 Feb 2023 07:50 )  WBC Count : 6.01 K/uL  RBC Count : 4.23 M/uL  Hemoglobin : 13.0 g/dL  Hematocrit : 42.6 %  Platelet Count - Automated : 203 K/uL  Mean Cell Volume : 100.7 fl  Mean Cell Hemoglobin : 30.7 pg  Mean Cell Hemoglobin Concentration : 30.5 gm/dL  Auto Neutrophil # : x  Auto Lymphocyte # : x  Auto Monocyte # : x  Auto Eosinophil # : x  Auto Basophil # : x  Auto Neutrophil % : x  Auto Lymphocyte % : x  Auto Monocyte % : x  Auto Eosinophil % : x  Auto Basophil % : x    02-17    143  |  104  |  7   ----------------------------<  133<H>  4.2   |  34<H>  |  0.63    Ca    8.7      17 Feb 2023 07:50                [  ]  DVT Prophylaxis  [  ]  Nutrition, Brand, Rate         Goal Rate        Abnormal Nutritional Status -  Malnutrition   Cachexia      Morbid Obesity BMI >/=40    RADIOLOGY & ADDITIONAL STUDIES:  ***    Goals of Care Discussion with Family/Proxy/Other   - see note from/family meeting set up for...     NUNO LAST    SCU progress note    INTERVAL HPI/OVERNIGHT EVENTS: No acute events overnight.    DNR [ ]   DNI  [  ]- FULL CODE    Covid - 19 PCR: COVID-19 PCR: NotDetec (14 Feb 2023 15:05)  COVID-19 PCR: NotDetec (03 Feb 2023 13:35)  COVID-19 PCR: Detected (16 Oct 2022 06:03)      The 4Ms    What Matters Most: see GOC  Age appropriate Medications/Screen for High Risk Medication: Yes  Mentation: see CAM below  Mobility: defer to physical exam    The Confusion Assessment Method (CAM) Diagnostic Algorithm     1: Acute Onset or Fluctuating Course  - Is there evidence of an acute change in mental status from the patient’s baseline? Did the (abnormal) behavior  fluctuate during the day, that is, tend to come and go, or increase and decrease in severity?  [ ] YES [x ] NO     2: Inattention  - Did the patient have difficulty focusing attention, being easily distractible, or having difficulty keeping track of what was being said?  [ ] YES [x ] NO     3: Disorganized thinking  -Was the patient’s thinking disorganized or incoherent, such as rambling or irrelevant conversation, unclear or illogical flow of ideas, or unpredictable switching from subject to subject?  [ ] YES [x ] NO    4: Altered Level of consciousness?  [ ] YES [x ] NO    The diagnosis of delirium by CAM requires the presence of features 1 and 2 and either 3 or 4.    PRESSORS: [ ] YES [x ] NO    Cardiovascular:  Heart Failure  Acute   Acute on Chronic  Chronic       carvedilol 6.25 milliGRAM(s) Oral every 12 hours    Pulmonary:  albuterol/ipratropium for Nebulization 3 milliLiter(s) Nebulizer every 6 hours  guaiFENesin Oral Liquid (Sugar-Free) 200 milliGRAM(s) Oral every 6 hours PRN    Hematalogic:  apixaban 5 milliGRAM(s) Oral every 12 hours    Other:  anastrozole 1 milliGRAM(s) Oral daily  artificial  tears Solution 1 Drop(s) Both EYES two times a day  chlorhexidine 2% Cloths 1 Application(s) Topical <User Schedule>  insulin lispro (ADMELOG) corrective regimen sliding scale   SubCutaneous three times a day with meals  insulin lispro (ADMELOG) corrective regimen sliding scale   SubCutaneous at bedtime  pantoprazole    Tablet 40 milliGRAM(s) Oral before breakfast  simvastatin 20 milliGRAM(s) Oral at bedtime    albuterol/ipratropium for Nebulization 3 milliLiter(s) Nebulizer every 6 hours  anastrozole 1 milliGRAM(s) Oral daily  apixaban 5 milliGRAM(s) Oral every 12 hours  artificial  tears Solution 1 Drop(s) Both EYES two times a day  carvedilol 6.25 milliGRAM(s) Oral every 12 hours  chlorhexidine 2% Cloths 1 Application(s) Topical <User Schedule>  guaiFENesin Oral Liquid (Sugar-Free) 200 milliGRAM(s) Oral every 6 hours PRN  insulin lispro (ADMELOG) corrective regimen sliding scale   SubCutaneous three times a day with meals  insulin lispro (ADMELOG) corrective regimen sliding scale   SubCutaneous at bedtime  pantoprazole    Tablet 40 milliGRAM(s) Oral before breakfast  simvastatin 20 milliGRAM(s) Oral at bedtime    Drug Dosing Weight  Height (cm): 154.9 (30 Jan 2023 16:23)  Weight (kg): 120 (03 Feb 2023 15:15)  BMI (kg/m2): 50 (03 Feb 2023 15:15)  BSA (m2): 2.13 (03 Feb 2023 15:15)    CENTRAL LINE: [ ] YES [ ] NO  LOCATION:   DATE INSERTED:  REMOVE: [ ] YES [ ] NO  EXPLAIN:    TIMOTHY: [ ] YES [ ] NO    DATE INSERTED:  REMOVE:  [ ] YES [ ] NO  EXPLAIN:    PAST MEDICAL & SURGICAL HISTORY:  Arthritis      Legally blind      Pre-diabetes      Breast cancer  right, no chemo or radiation      COPD exacerbation      Atrial fibrillation      HF (heart failure)  HFpEF 7/29      HTN (hypertension)      Deep vein thrombosis (DVT)  Left Lower Extremity      H/O CHF      No significant past surgical history                        PHYSICAL EXAM:    GENERAL: Morbidly obese elderly female, NAD, well-developed  HEAD:  Atraumatic, Normocephalic  EYES: hx of glaucoma, left eye deformity   ENMT: No tonsillar erythema, exudates, or enlargement; Moist mucous membranes, Good dentition, No lesions  NECK: Supple, No JVD, Normal thyroid  NERVOUS SYSTEM:  Alert & Oriented X3, Good concentration; Motor Strength 5/5 B/L upper and lower extremities; DTRs 2+ intact and symmetric  CHEST/LUNG: Clear to percussion bilaterally; No rales, rhonchi, wheezing, or rubs  HEART: Regular rate and rhythm; No murmurs, rubs, or gallops  ABDOMEN: Soft, Nontender, Nondistended; Bowel sounds present  EXTREMITIES:  2+ Peripheral Pulses, No clubbing, cyanosis, or edema  LYMPH: No lymphadenopathy noted  SKIN: No rashes or lesions      LABS:  CBC Full  -  ( 17 Feb 2023 07:50 )  WBC Count : 6.01 K/uL  RBC Count : 4.23 M/uL  Hemoglobin : 13.0 g/dL  Hematocrit : 42.6 %  Platelet Count - Automated : 203 K/uL  Mean Cell Volume : 100.7 fl  Mean Cell Hemoglobin : 30.7 pg  Mean Cell Hemoglobin Concentration : 30.5 gm/dL  Auto Neutrophil # : x  Auto Lymphocyte # : x  Auto Monocyte # : x  Auto Eosinophil # : x  Auto Basophil # : x  Auto Neutrophil % : x  Auto Lymphocyte % : x  Auto Monocyte % : x  Auto Eosinophil % : x  Auto Basophil % : x    02-17    143  |  104  |  7   ----------------------------<  133<H>  4.2   |  34<H>  |  0.63    Ca    8.7      17 Feb 2023 07:50                [  ]  DVT Prophylaxis  [  ]  Nutrition, Brand, Rate         Goal Rate        Abnormal Nutritional Status -  Malnutrition   Cachexia      Morbid Obesity BMI >/=40    RADIOLOGY & ADDITIONAL STUDIES:  ***    Goals of Care Discussion with Family/Proxy/Other   - see note from/family meeting set up for...     NUNO LAST    SCU progress note    INTERVAL HPI/OVERNIGHT EVENTS: No acute events overnight.    DNR [ ]   DNI  [  ]- FULL CODE    Covid - 19 PCR: COVID-19 PCR: NotDetec (14 Feb 2023 15:05)  COVID-19 PCR: NotDetec (03 Feb 2023 13:35)  COVID-19 PCR: Detected (16 Oct 2022 06:03)      The 4Ms    What Matters Most: see GOC  Age appropriate Medications/Screen for High Risk Medication: Yes  Mentation: see CAM below  Mobility: defer to physical exam    The Confusion Assessment Method (CAM) Diagnostic Algorithm     1: Acute Onset or Fluctuating Course  - Is there evidence of an acute change in mental status from the patient’s baseline? Did the (abnormal) behavior  fluctuate during the day, that is, tend to come and go, or increase and decrease in severity?  [ ] YES [x ] NO     2: Inattention  - Did the patient have difficulty focusing attention, being easily distractible, or having difficulty keeping track of what was being said?  [ ] YES [x ] NO     3: Disorganized thinking  -Was the patient’s thinking disorganized or incoherent, such as rambling or irrelevant conversation, unclear or illogical flow of ideas, or unpredictable switching from subject to subject?  [ ] YES [x ] NO    4: Altered Level of consciousness?  [ ] YES [x ] NO    The diagnosis of delirium by CAM requires the presence of features 1 and 2 and either 3 or 4.    PRESSORS: [ ] YES [x ] NO    Cardiovascular:  Heart Failure  Acute   Acute on Chronic  Chronic       carvedilol 6.25 milliGRAM(s) Oral every 12 hours    Pulmonary:  albuterol/ipratropium for Nebulization 3 milliLiter(s) Nebulizer every 6 hours  guaiFENesin Oral Liquid (Sugar-Free) 200 milliGRAM(s) Oral every 6 hours PRN    Hematalogic:  apixaban 5 milliGRAM(s) Oral every 12 hours    Other:  anastrozole 1 milliGRAM(s) Oral daily  artificial  tears Solution 1 Drop(s) Both EYES two times a day  chlorhexidine 2% Cloths 1 Application(s) Topical <User Schedule>  insulin lispro (ADMELOG) corrective regimen sliding scale   SubCutaneous three times a day with meals  insulin lispro (ADMELOG) corrective regimen sliding scale   SubCutaneous at bedtime  pantoprazole    Tablet 40 milliGRAM(s) Oral before breakfast  simvastatin 20 milliGRAM(s) Oral at bedtime    albuterol/ipratropium for Nebulization 3 milliLiter(s) Nebulizer every 6 hours  anastrozole 1 milliGRAM(s) Oral daily  apixaban 5 milliGRAM(s) Oral every 12 hours  artificial  tears Solution 1 Drop(s) Both EYES two times a day  carvedilol 6.25 milliGRAM(s) Oral every 12 hours  chlorhexidine 2% Cloths 1 Application(s) Topical <User Schedule>  guaiFENesin Oral Liquid (Sugar-Free) 200 milliGRAM(s) Oral every 6 hours PRN  insulin lispro (ADMELOG) corrective regimen sliding scale   SubCutaneous three times a day with meals  insulin lispro (ADMELOG) corrective regimen sliding scale   SubCutaneous at bedtime  pantoprazole    Tablet 40 milliGRAM(s) Oral before breakfast  simvastatin 20 milliGRAM(s) Oral at bedtime    Drug Dosing Weight  Height (cm): 154.9 (30 Jan 2023 16:23)  Weight (kg): 120 (03 Feb 2023 15:15)  BMI (kg/m2): 50 (03 Feb 2023 15:15)  BSA (m2): 2.13 (03 Feb 2023 15:15)    CENTRAL LINE: [ ] YES [ ] NO  LOCATION:   DATE INSERTED:  REMOVE: [ ] YES [ ] NO  EXPLAIN:    TIMOTHY: [ ] YES [ ] NO    DATE INSERTED:  REMOVE:  [ ] YES [ ] NO  EXPLAIN:    PAST MEDICAL & SURGICAL HISTORY:  Arthritis      Legally blind      Pre-diabetes      Breast cancer  right, no chemo or radiation      COPD exacerbation      Atrial fibrillation      HF (heart failure)  HFpEF 7/29      HTN (hypertension)      Deep vein thrombosis (DVT)  Left Lower Extremity      H/O CHF      No significant past surgical history                        PHYSICAL EXAM:    GENERAL: Morbidly obese elderly female, NAD, well-developed  HEAD:  Atraumatic, Normocephalic  EYES: hx of glaucoma, left eye deformity   ENMT: No tonsillar erythema, exudates, or enlargement; Moist mucous membranes, Good dentition, No lesions  NECK: Supple, No JVD, Normal thyroid  NERVOUS SYSTEM:  Alert & Oriented X 2-3, moving all extremities equally   CHEST/LUNG: Clear to percussion bilaterally; No rales, rhonchi, wheezing, or rubs  HEART: Regular rate and rhythm; No murmurs, rubs, or gallops  ABDOMEN: Soft, Nontender, Nondistended; Bowel sounds present  EXTREMITIES:  2+ Peripheral Pulses, No clubbing, cyanosis, or edema  LYMPH: No lymphadenopathy noted  SKIN: No rashes or lesions      LABS:  CBC Full  -  ( 17 Feb 2023 07:50 )  WBC Count : 6.01 K/uL  RBC Count : 4.23 M/uL  Hemoglobin : 13.0 g/dL  Hematocrit : 42.6 %  Platelet Count - Automated : 203 K/uL  Mean Cell Volume : 100.7 fl  Mean Cell Hemoglobin : 30.7 pg  Mean Cell Hemoglobin Concentration : 30.5 gm/dL  Auto Neutrophil # : x  Auto Lymphocyte # : x  Auto Monocyte # : x  Auto Eosinophil # : x  Auto Basophil # : x  Auto Neutrophil % : x  Auto Lymphocyte % : x  Auto Monocyte % : x  Auto Eosinophil % : x  Auto Basophil % : x    02-17    143  |  104  |  7   ----------------------------<  133<H>  4.2   |  34<H>  |  0.63    Ca    8.7      17 Feb 2023 07:50                [  ]  DVT Prophylaxis  [  ]  Nutrition, Brand, Rate         Goal Rate        Abnormal Nutritional Status -  Malnutrition   Cachexia      Morbid Obesity BMI >/=40    RADIOLOGY & ADDITIONAL STUDIES:  ***    Goals of Care Discussion with Family/Proxy/Other   - see note from/family meeting set up for...

## 2023-02-18 LAB
GLUCOSE BLDC GLUCOMTR-MCNC: 111 MG/DL — HIGH (ref 70–99)
GLUCOSE BLDC GLUCOMTR-MCNC: 139 MG/DL — HIGH (ref 70–99)
GLUCOSE BLDC GLUCOMTR-MCNC: 144 MG/DL — HIGH (ref 70–99)
GLUCOSE BLDC GLUCOMTR-MCNC: 197 MG/DL — HIGH (ref 70–99)

## 2023-02-18 PROCEDURE — 99232 SBSQ HOSP IP/OBS MODERATE 35: CPT

## 2023-02-18 RX ADMIN — Medication 3 MILLILITER(S): at 15:24

## 2023-02-18 RX ADMIN — Medication 3 MILLILITER(S): at 20:27

## 2023-02-18 RX ADMIN — APIXABAN 5 MILLIGRAM(S): 2.5 TABLET, FILM COATED ORAL at 05:50

## 2023-02-18 RX ADMIN — Medication 3 MILLILITER(S): at 08:36

## 2023-02-18 RX ADMIN — APIXABAN 5 MILLIGRAM(S): 2.5 TABLET, FILM COATED ORAL at 18:24

## 2023-02-18 RX ADMIN — CARVEDILOL PHOSPHATE 6.25 MILLIGRAM(S): 80 CAPSULE, EXTENDED RELEASE ORAL at 05:50

## 2023-02-18 RX ADMIN — Medication 3 MILLILITER(S): at 02:08

## 2023-02-18 RX ADMIN — ANASTROZOLE 1 MILLIGRAM(S): 1 TABLET ORAL at 12:07

## 2023-02-18 RX ADMIN — Medication 1 DROP(S): at 05:50

## 2023-02-18 RX ADMIN — Medication 1 DROP(S): at 18:24

## 2023-02-18 RX ADMIN — SIMVASTATIN 20 MILLIGRAM(S): 20 TABLET, FILM COATED ORAL at 21:50

## 2023-02-18 RX ADMIN — PANTOPRAZOLE SODIUM 40 MILLIGRAM(S): 20 TABLET, DELAYED RELEASE ORAL at 05:55

## 2023-02-18 RX ADMIN — CHLORHEXIDINE GLUCONATE 1 APPLICATION(S): 213 SOLUTION TOPICAL at 05:49

## 2023-02-18 NOTE — PROGRESS NOTE ADULT - PROBLEM SELECTOR PLAN 10
Medically stable for discharge.  Have not received authorization for Trilogy.  Trilogy paperwork submitted 2/7. [facility] has stated that they mis-filled request and it may take another 15 days to get machine.  CM working with [facility].  Will call [facility] after holiday break to escalate issue.

## 2023-02-18 NOTE — PROGRESS NOTE ADULT - PROBLEM SELECTOR PLAN 3
Acute resolved. Diastolic heart failure with mild pulmonary hypertension.  Continue coreg BID.  ECHO July 2022- EF 55-60%, LVH, GIDD, mild pulm HTN  pro-BNP 3916 (prev 1943)  S/p diuresis in ICU. Completed diuretics  Daily weight   Dr Siu following - no change in tx plan.

## 2023-02-18 NOTE — PROGRESS NOTE ADULT - PROBLEM SELECTOR PLAN 10
Medically stable for discharge.  Have not received authorization for Trilogy.  Trilogy paperwork submitted 2/7. [facility] has stated that they mis-filled request and it may take another 15 days to get machine.  CM working with [facility].  Will call [facility] after holiday break to escalate issue.

## 2023-02-18 NOTE — PROGRESS NOTE ADULT - SUBJECTIVE AND OBJECTIVE BOX
NUNO LAST    SCU progress note    INTERVAL HPI/OVERNIGHT EVENTS: ***No overnight events. Still awaiting auth for Trilogy.    DNR [ ]   DNI  [  ]  FULL CODE    Covid - 19 PCR: negative 2/14    The 4Ms    What Matters Most: see GOC  Age appropriate Medications/Screen for High Risk Medication: Yes  Mentation: see CAM below  Mobility: defer to physical exam    The Confusion Assessment Method (CAM) Diagnostic Algorithm     1: Acute Onset or Fluctuating Course  - Is there evidence of an acute change in mental status from the patient’s baseline? Did the (abnormal) behavior  fluctuate during the day, that is, tend to come and go, or increase and decrease in severity?  [ ] YES [x ] NO     2: Inattention  - Did the patient have difficulty focusing attention, being easily distractible, or having difficulty keeping track of what was being said?  [ ] YES [x ] NO     3: Disorganized thinking  -Was the patient’s thinking disorganized or incoherent, such as rambling or irrelevant conversation, unclear or illogical flow of ideas, or unpredictable switching from subject to subject?  [ ] YES [x ] NO    4: Altered Level of consciousness?  [ ] YES x] NO    The diagnosis of delirium by CAM requires the presence of features 1 and 2 and either 3 or 4.    PRESSORS: [ ] YES [x ] NO    Cardiovascular:  Heart Failure  Acute   Acute on Chronic  Chronic       carvedilol 6.25 milliGRAM(s) Oral every 12 hours    Pulmonary:  albuterol/ipratropium for Nebulization 3 milliLiter(s) Nebulizer every 6 hours  guaiFENesin Oral Liquid (Sugar-Free) 200 milliGRAM(s) Oral every 6 hours PRN    Hematalogic:  apixaban 5 milliGRAM(s) Oral every 12 hours    Other:  anastrozole 1 milliGRAM(s) Oral daily  artificial  tears Solution 1 Drop(s) Both EYES two times a day  chlorhexidine 2% Cloths 1 Application(s) Topical <User Schedule>  insulin lispro (ADMELOG) corrective regimen sliding scale   SubCutaneous three times a day with meals  insulin lispro (ADMELOG) corrective regimen sliding scale   SubCutaneous at bedtime  pantoprazole    Tablet 40 milliGRAM(s) Oral before breakfast  simvastatin 20 milliGRAM(s) Oral at bedtime    albuterol/ipratropium for Nebulization 3 milliLiter(s) Nebulizer every 6 hours  anastrozole 1 milliGRAM(s) Oral daily  apixaban 5 milliGRAM(s) Oral every 12 hours  artificial  tears Solution 1 Drop(s) Both EYES two times a day  carvedilol 6.25 milliGRAM(s) Oral every 12 hours  chlorhexidine 2% Cloths 1 Application(s) Topical <User Schedule>  guaiFENesin Oral Liquid (Sugar-Free) 200 milliGRAM(s) Oral every 6 hours PRN  insulin lispro (ADMELOG) corrective regimen sliding scale   SubCutaneous three times a day with meals  insulin lispro (ADMELOG) corrective regimen sliding scale   SubCutaneous at bedtime  pantoprazole    Tablet 40 milliGRAM(s) Oral before breakfast  simvastatin 20 milliGRAM(s) Oral at bedtime    Drug Dosing Weight  Height (cm): 154.9 (30 Jan 2023 16:23)  Weight (kg): 120 (03 Feb 2023 15:15)  BMI (kg/m2): 50 (03 Feb 2023 15:15)  BSA (m2): 2.13 (03 Feb 2023 15:15)    CENTRAL LINE: [ ] YES [x ] NO  LOCATION:   DATE INSERTED:  REMOVE: [ ] YES [ ] NO  EXPLAIN:    AGUIRRE: [ ] YES [x ] NO    DATE INSERTED:  REMOVE:  [ ] YES [ ] NO  EXPLAIN:    PAST MEDICAL & SURGICAL HISTORY:  Arthritis      Legally blind      Pre-diabetes      Breast cancer  right, no chemo or radiation      COPD exacerbation      Atrial fibrillation      HF (heart failure)  HFpEF 7/29      HTN (hypertension)      Deep vein thrombosis (DVT)  Left Lower Extremity      H/O CHF      No significant past surgical history                  02-17 @ 07:01  -  02-18 @ 07:00  --------------------------------------------------------  IN: 0 mL / OUT: 950 mL / NET: -950 mL            PHYSICAL EXAM:    GENERAL: NAD, well-groomed, well-developed  HEAD:  Atraumatic, Normocephalic  EYES: Left eye deformity  ENMT: No tonsillar erythema, exudates  NECK: Supple, No JVD  NERVOUS SYSTEM:  Alert & Oriented X3, Follows commands. Moving all extremities.   CHEST/LUNG: Clear to percussion bilaterally; No rales, rhonchi, wheezing, or rubs  HEART: Regular rate and rhythm; No murmurs, rubs, or gallops  ABDOMEN: Soft, Obese, Nontender, Nondistended; Bowel sounds present  EXTREMITIES:  2+ Peripheral Pulses, No clubbing, cyanosis, or edema  LYMPH: No lymphadenopathy noted  SKIN: No rashes or lesions      LABS:  CBC Full  -  ( 17 Feb 2023 07:50 )  WBC Count : 6.01 K/uL  RBC Count : 4.23 M/uL  Hemoglobin : 13.0 g/dL  Hematocrit : 42.6 %  Platelet Count - Automated : 203 K/uL  Mean Cell Volume : 100.7 fl  Mean Cell Hemoglobin : 30.7 pg  Mean Cell Hemoglobin Concentration : 30.5 gm/dL  Auto Neutrophil # : x  Auto Lymphocyte # : x  Auto Monocyte # : x  Auto Eosinophil # : x  Auto Basophil # : x  Auto Neutrophil % : x  Auto Lymphocyte % : x  Auto Monocyte % : x  Auto Eosinophil % : x  Auto Basophil % : x    02-17    143  |  104  |  7   ----------------------------<  133<H>  4.2   |  34<H>  |  0.63    Ca    8.7      17 Feb 2023 07:50                [  ]  DVT Prophylaxis  [  ]  Nutrition, Brand, Rate         Goal Rate        Abnormal Nutritional Status -  Malnutrition   Cachexia         RADIOLOGY & ADDITIONAL STUDIES:  ***  < from: CT Chest No Cont (01.31.23 @ 16:36) >    FINDINGS:    Image quality degraded due to extensive respiratory motion.    AIRWAYS, LUNGS, PLEURA: Trachea and mainstem bronchi patent. Biapical   scarring unchanged. Bibasilar atelectasis. No focal lung consolidation.   No pleural effusion.    MEDIASTINUM: Cardiomegaly. No pericardial effusion. Thoracic aorta normal   caliber.  No large mediastinal lymph nodes.    IMAGED ABDOMEN: Unremarkable.    SOFT TISSUES: Unremarkable.    BONES: Unremarkable.      IMPRESSION:.    No focal lung consolidation.    Bibasilar atelectasis.    --- End of Report ---    < end of copied text >  < from: CT Chest No Cont (01.31.23 @ 16:36) >  FINDINGS:    Image quality degraded due to extensive respiratory motion.    AIRWAYS, LUNGS, PLEURA: Trachea and mainstem bronchi patent. Biapical   scarring unchanged. Bibasilar atelectasis. No focal lung consolidation.   No pleural effusion.    MEDIASTINUM: Cardiomegaly. No pericardial effusion. Thoracic aorta normal   caliber.  No large mediastinal lymph nodes.    IMAGED ABDOMEN: Unremarkable.    SOFT TISSUES: Unremarkable.    BONES: Unremarkable.      IMPRESSION:.    No focal lung consolidation.    Bibasilar atelectasis.    --- End of Report ---          < end of copied text >    Goals of Care Discussion with Family/Proxy/Other   - see note from 2/06/23

## 2023-02-18 NOTE — PROGRESS NOTE ADULT - SUBJECTIVE AND OBJECTIVE BOX
Patient was seen and examined  Patient is a 89y old  Female who presents with a chief complaint of AHRF (18 Feb 2023 12:55)      INTERVAL HPI/OVERNIGHT EVENTS:  T(C): 36.7 (02-18-23 @ 12:52), Max: 36.7 (02-18-23 @ 12:52)  HR: 65 (02-18-23 @ 12:52) (59 - 79)  BP: 121/66 (02-18-23 @ 12:52) (114/90 - 137/98)  RR: 18 (02-18-23 @ 12:52) (18 - 20)  SpO2: 100% (02-18-23 @ 12:52) (99% - 100%)  Wt(kg): --  I&O's Summary    17 Feb 2023 07:01  -  18 Feb 2023 07:00  --------------------------------------------------------  IN: 0 mL / OUT: 950 mL / NET: -950 mL        LABS:                        13.0   6.01  )-----------( 203      ( 17 Feb 2023 07:50 )             42.6     02-17    143  |  104  |  7   ----------------------------<  133<H>  4.2   |  34<H>  |  0.63    Ca    8.7      17 Feb 2023 07:50          CAPILLARY BLOOD GLUCOSE      POCT Blood Glucose.: 144 mg/dL (18 Feb 2023 11:57)  POCT Blood Glucose.: 111 mg/dL (18 Feb 2023 08:07)  POCT Blood Glucose.: 121 mg/dL (17 Feb 2023 21:50)  POCT Blood Glucose.: 112 mg/dL (17 Feb 2023 16:46)              MEDICATIONS  (STANDING):  albuterol/ipratropium for Nebulization 3 milliLiter(s) Nebulizer every 6 hours  anastrozole 1 milliGRAM(s) Oral daily  apixaban 5 milliGRAM(s) Oral every 12 hours  artificial  tears Solution 1 Drop(s) Both EYES two times a day  carvedilol 6.25 milliGRAM(s) Oral every 12 hours  chlorhexidine 2% Cloths 1 Application(s) Topical <User Schedule>  insulin lispro (ADMELOG) corrective regimen sliding scale   SubCutaneous three times a day with meals  insulin lispro (ADMELOG) corrective regimen sliding scale   SubCutaneous at bedtime  pantoprazole    Tablet 40 milliGRAM(s) Oral before breakfast  simvastatin 20 milliGRAM(s) Oral at bedtime    MEDICATIONS  (PRN):  guaiFENesin Oral Liquid (Sugar-Free) 200 milliGRAM(s) Oral every 6 hours PRN Cough      RADIOLOGY & ADDITIONAL TESTS:    Imaging Personally Reviewed:  [ ] YES  [ ] NO    REVIEW OF SYSTEMS:  CONSTITUTIONAL: No fever, weight loss, or fatigue  EYES: No eye pain, visual disturbances, or discharge  ENMT:  No difficulty hearing, tinnitus, vertigo; No sinus or throat pain  NECK: No pain or stiffness  BREASTS: No pain, masses, or nipple discharge  RESPIRATORY: No cough, wheezing, chills or hemoptysis; No shortness of breath  CARDIOVASCULAR: No chest pain, palpitations, dizziness, or leg swelling  GASTROINTESTINAL: No abdominal or epigastric pain. No nausea, vomiting, or hematemesis; No diarrhea or constipation. No melena or hematochezia.  GENITOURINARY: No dysuria, frequency, hematuria, or incontinence  NEUROLOGICAL: No headaches, memory loss, loss of strength, numbness, or tremors  SKIN: No itching, burning, rashes, or lesions   LYMPH NODES: No enlarged glands  ENDOCRINE: No heat or cold intolerance; No hair loss  MUSCULOSKELETAL: No joint pain or swelling; No muscle, back, or extremity pain  PSYCHIATRIC: No depression, anxiety, mood swings, or difficulty sleeping  HEME/LYMPH: No easy bruising, or bleeding gums  ALLERY AND IMMUNOLOGIC: No hives or eczema      Consultant(s) Notes Reviewed:  [ x ] YES  [ ] NO    PHYSICAL EXAM:  GENERAL: NAD, well-groomed, well-developed  HEAD:  Atraumatic, Normocephalic  EYES: blind   ENMT: No tonsillar erythema, exudates, or enlargement; Moist mucous membranes, Good dentition, No lesions  NECK: Supple, No JVD, Normal thyroid  NERVOUS SYSTEM:  Alert & Oriented X3, Good concentration; Motor Strength 5/5 B/L upper and lower extremities; DTRs 2+ intact and symmetric  CHEST/LUNG: Clear to percussion bilaterally; No rales, rhonchi, wheezing, or rubs  HEART: Regular rate and rhythm; No murmurs, rubs, or gallops  ABDOMEN: Soft, Nontender, Nondistended; Bowel sounds present  EXTREMITIES:  2+ Peripheral Pulses, No clubbing, cyanosis, or edema  LYMPH: No lymphadenopathy noted  SKIN: No rashes or lesions    Care Discussed with Consultants/Other Providers [ x] YES  [ ] NO

## 2023-02-18 NOTE — PROGRESS NOTE ADULT - SUBJECTIVE AND OBJECTIVE BOX
CHIEF COMPLAINT:Patient is a 89y old  Female who presents with a chief complaint of AHRF.Pt appears comfortable.    	  REVIEW OF SYSTEMS:  CONSTITUTIONAL: No fever, weight loss, or fatigue  EYES: No eye pain, visual disturbances, or discharge  ENT:  No difficulty hearing, tinnitus, vertigo; No sinus or throat pain  NECK: No pain or stiffness  RESPIRATORY: No cough, wheezing, chills or hemoptysis; No Shortness of Breath  CARDIOVASCULAR: No chest pain, palpitations, passing out, dizziness, or leg swelling  GASTROINTESTINAL: No abdominal or epigastric pain. No nausea, vomiting, or hematemesis; No diarrhea or constipation. No melena or hematochezia.  GENITOURINARY: No dysuria, frequency, hematuria, or incontinence  NEUROLOGICAL: No headaches, memory loss, loss of strength, numbness, or tremors  SKIN: No itching, burning, rashes, or lesions   LYMPH Nodes: No enlarged glands  ENDOCRINE: No heat or cold intolerance; No hair loss  MUSCULOSKELETAL: No joint pain or swelling; No muscle, back, or extremity pain  PSYCHIATRIC: No depression, anxiety, mood swings, or difficulty sleeping  HEME/LYMPH: No easy bruising, or bleeding gums  ALLERGY AND IMMUNOLOGIC: No hives or eczema	    PHYSICAL EXAM:  T(C): 36.7 (02-18-23 @ 12:52), Max: 36.7 (02-17-23 @ 13:03)  HR: 65 (02-18-23 @ 12:52) (59 - 79)  BP: 121/66 (02-18-23 @ 12:52) (114/90 - 137/98)  RR: 18 (02-18-23 @ 12:52) (18 - 20)  SpO2: 100% (02-18-23 @ 12:52) (98% - 100%)  Wt(kg): --  I&O's Summary    17 Feb 2023 07:01  -  18 Feb 2023 07:00  --------------------------------------------------------  IN: 0 mL / OUT: 950 mL / NET: -950 mL        Appearance: Normal	  HEENT:   Normal oral mucosa, PERRL, EOMI	  Lymphatic: No lymphadenopathy  Cardiovascular: Normal S1 S2, No JVD, No murmurs, No edema  Respiratory: Lungs clear to auscultation	  Psychiatry: A & O x 3, Mood & affect appropriate  Gastrointestinal:  Soft, Non-tender, + BS	  Skin: No rashes, No ecchymoses, No cyanosis	  Neurologic: Non-focal  Extremities: Normal range of motion, No clubbing, cyanosis or edema  Vascular: Peripheral pulses palpable 2+ bilaterally    MEDICATIONS  (STANDING):  albuterol/ipratropium for Nebulization 3 milliLiter(s) Nebulizer every 6 hours  anastrozole 1 milliGRAM(s) Oral daily  apixaban 5 milliGRAM(s) Oral every 12 hours  artificial  tears Solution 1 Drop(s) Both EYES two times a day  carvedilol 6.25 milliGRAM(s) Oral every 12 hours  chlorhexidine 2% Cloths 1 Application(s) Topical <User Schedule>  insulin lispro (ADMELOG) corrective regimen sliding scale   SubCutaneous three times a day with meals  insulin lispro (ADMELOG) corrective regimen sliding scale   SubCutaneous at bedtime  pantoprazole    Tablet 40 milliGRAM(s) Oral before breakfast  simvastatin 20 milliGRAM(s) Oral at bedtime        	  LABS:	 	                        13.0   6.01  )-----------( 203      ( 17 Feb 2023 07:50 )             42.6     02-17    143  |  104  |  7   ----------------------------<  133<H>  4.2   |  34<H>  |  0.63    Ca    8.7      17 Feb 2023 07:50      proBNP: Serum Pro-Brain Natriuretic Peptide: 3916 pg/mL (01-30 @ 20:20)

## 2023-02-18 NOTE — PROGRESS NOTE ADULT - ASSESSMENT
89 yo F, from home, with PMHx HTN, COPD, Afib, HFpEF (7/29/22 EF 55-60%, LVH, GIDD, Breast CA, and remote h/o LLE DVT p/w SOB and hypoxia to high 70s, admitted to medicine for AHRF 2/2 Acute diastolic CHF +/- COPD exacerbation and UTI (completed treatment). ICU consulted for concerns of AMS in the setting of Acute on chronic compensated respiratory acidosis requiring new trial of Bipap.  Patient downgraded to AI for further management. Currently on 2L NC. Will need BiPAP Q HS.   2/5 Change to AVAPS setting qHS.   02/08: patient medically optimized for discharge. Plan to d/c to home with AVAPS. On 2L NC and tolerating well. Awaiting for AVAPS machine. CM following.   02/09: Patient optimized medically and plan to home with family. Awaiting AVAPS. Has home oxygen  2/14  Trilogy approved. pending Auth  2/16 - still pending Auth for Trilogy per CM, CM to f/u. Pt is medically optimized  for discharge

## 2023-02-18 NOTE — PROGRESS NOTE ADULT - PROBLEM SELECTOR PLAN 1
Acute on chronic respiratory acidosis, COPD  and  Acute diastolic heart failure.   Acute resolved.  Completed steroids.  Continue bronchodilators.  Continue AVAPS nightly and as needed during the day. AVAPS-AE ordered 2/7/23 for home use  Remains hypercapnic on AVAPS PCO2 57. PCO2 on admission on BIPAP 84.  Failed BIPAP and AVAPS therapy.  Will need Trilogy/AVAPS-AE upon discharge. Without AVAPS-AE patient is a high for readmission and intubation secondary to persistent hypercapnia. Second admission for respiratory distress since October 2022. Patient also has spinal deformities which is contributing to her hypercapnia.  Trilogy approved 2/14 - pending Auth and delivery, CM is following.

## 2023-02-18 NOTE — PROGRESS NOTE ADULT - PROBLEM SELECTOR PLAN 2
Exacerbation resolved.  Continue AVAPS nightly and as needed during the day. Oxygen support when not on AVAPS  Remains hypercapnic despite AVAPS usage. PCO2 on AVAPS 57-75. PCO2 on BIPAP 85. Failed BIAP and AVAPS therapy. Patient will need AVAPS-AE/Trilogy at home to manage persistent hypercapnia. Without AVAPS-AE/Trilogy patient is a high risk for readmission and intubation.   Steroids completed.  Continue bronchodilators.  Trilogy approved 2/14 - pending auth and delivery.

## 2023-02-18 NOTE — PROGRESS NOTE ADULT - SUBJECTIVE AND OBJECTIVE BOX
Patient is a 89y old  Female who presents with a chief complaint of AHRF (17 Feb 2023 15:08)      INTERVAL HPI/OVERNIGHT EVENTS:      VITAL SIGNS:  T(F): 97.2 (02-18-23 @ 05:00)  HR: 75 (02-18-23 @ 05:00)  BP: 130/90 (02-18-23 @ 05:00)  RR: 20 (02-18-23 @ 05:00)  SpO2: 100% (02-18-23 @ 05:00)  Wt(kg): --  I&O's Detail    17 Feb 2023 07:01  -  18 Feb 2023 07:00  --------------------------------------------------------  IN:  Total IN: 0 mL    OUT:    Voided (mL): 950 mL  Total OUT: 950 mL    Total NET: -950 mL              REVIEW OF SYSTEMS:    CONSTITUTIONAL:  No fevers, chills, sweats    HEENT:  Eyes:  No diplopia or blurred vision. ENT:  No earache, sore throat or runny nose.    CARDIOVASCULAR:  No pressure, squeezing, tightness, or heaviness about the chest; no palpitations.    RESPIRATORY:  Per HPI    GASTROINTESTINAL:  No abdominal pain, nausea, vomiting or diarrhea.    GENITOURINARY:  No dysuria, frequency or urgency.    NEUROLOGIC:  No paresthesias, fasciculations, seizures or weakness.    PSYCHIATRIC:  No disorder of thought or mood.      PHYSICAL EXAM:    Constitutional: Well developed and nourished  Eyes:Perrla  ENMT: normal  Neck:supple  Respiratory: good air entry  Cardiovascular: S1 S2 regular  Gastrointestinal: Soft, Non tender  Extremities: No edema  Vascular:normal  Neurological:Awake, alert,Ox3  Musculoskeletal:Normal      MEDICATIONS  (STANDING):  albuterol/ipratropium for Nebulization 3 milliLiter(s) Nebulizer every 6 hours  anastrozole 1 milliGRAM(s) Oral daily  apixaban 5 milliGRAM(s) Oral every 12 hours  artificial  tears Solution 1 Drop(s) Both EYES two times a day  carvedilol 6.25 milliGRAM(s) Oral every 12 hours  chlorhexidine 2% Cloths 1 Application(s) Topical <User Schedule>  insulin lispro (ADMELOG) corrective regimen sliding scale   SubCutaneous three times a day with meals  insulin lispro (ADMELOG) corrective regimen sliding scale   SubCutaneous at bedtime  pantoprazole    Tablet 40 milliGRAM(s) Oral before breakfast  simvastatin 20 milliGRAM(s) Oral at bedtime    MEDICATIONS  (PRN):  guaiFENesin Oral Liquid (Sugar-Free) 200 milliGRAM(s) Oral every 6 hours PRN Cough      Allergies    losartan (Angioedema)    Intolerances    Chicken (Stomach Upset)      LABS:                        13.0   6.01  )-----------( 203      ( 17 Feb 2023 07:50 )             42.6     02-17    143  |  104  |  7   ----------------------------<  133<H>  4.2   |  34<H>  |  0.63    Ca    8.7      17 Feb 2023 07:50                CAPILLARY BLOOD GLUCOSE      POCT Blood Glucose.: 111 mg/dL (18 Feb 2023 08:07)  POCT Blood Glucose.: 121 mg/dL (17 Feb 2023 21:50)  POCT Blood Glucose.: 112 mg/dL (17 Feb 2023 16:46)  POCT Blood Glucose.: 182 mg/dL (17 Feb 2023 11:41)        RADIOLOGY & ADDITIONAL TESTS:    CXR:    Ct scan chest:    ekg;    echo: Patient is a 89y old  Female who presents with a chief complaint of AHRF (17 Feb 2023 15:08)  Awake, alert, and clinically unchanged from previous day.    INTERVAL HPI/OVERNIGHT EVENTS:      VITAL SIGNS:  T(F): 97.2 (02-18-23 @ 05:00)  HR: 75 (02-18-23 @ 05:00)  BP: 130/90 (02-18-23 @ 05:00)  RR: 20 (02-18-23 @ 05:00)  SpO2: 100% (02-18-23 @ 05:00)  Wt(kg): --  I&O's Detail    17 Feb 2023 07:01  -  18 Feb 2023 07:00  --------------------------------------------------------  IN:  Total IN: 0 mL    OUT:    Voided (mL): 950 mL  Total OUT: 950 mL    Total NET: -950 mL              REVIEW OF SYSTEMS:    CONSTITUTIONAL:  No fevers, chills, sweats    HEENT:  Eyes:  No diplopia or blurred vision. ENT:  No earache, sore throat or runny nose.    CARDIOVASCULAR:  No pressure, squeezing, tightness, or heaviness about the chest; no palpitations.    RESPIRATORY:  Per HPI    GASTROINTESTINAL:  No abdominal pain, nausea, vomiting or diarrhea.    GENITOURINARY:  No dysuria, frequency or urgency.    NEUROLOGIC:  No paresthesias, fasciculations, seizures or weakness.    PSYCHIATRIC:  No disorder of thought or mood.      PHYSICAL EXAM:    Constitutional: Well developed and nourished  Eyes:Perrla  ENMT: normal  Neck:supple  Respiratory: good air entry  Cardiovascular: S1 S2 regular  Gastrointestinal: Soft, Non tender  Extremities: No edema  Vascular:normal  Neurological:Awake, alert,Ox3  Musculoskeletal:Normal      MEDICATIONS  (STANDING):  albuterol/ipratropium for Nebulization 3 milliLiter(s) Nebulizer every 6 hours  anastrozole 1 milliGRAM(s) Oral daily  apixaban 5 milliGRAM(s) Oral every 12 hours  artificial  tears Solution 1 Drop(s) Both EYES two times a day  carvedilol 6.25 milliGRAM(s) Oral every 12 hours  chlorhexidine 2% Cloths 1 Application(s) Topical <User Schedule>  insulin lispro (ADMELOG) corrective regimen sliding scale   SubCutaneous three times a day with meals  insulin lispro (ADMELOG) corrective regimen sliding scale   SubCutaneous at bedtime  pantoprazole    Tablet 40 milliGRAM(s) Oral before breakfast  simvastatin 20 milliGRAM(s) Oral at bedtime    MEDICATIONS  (PRN):  guaiFENesin Oral Liquid (Sugar-Free) 200 milliGRAM(s) Oral every 6 hours PRN Cough      Allergies    losartan (Angioedema)    Intolerances    Chicken (Stomach Upset)      LABS:                        13.0   6.01  )-----------( 203      ( 17 Feb 2023 07:50 )             42.6     02-17    143  |  104  |  7   ----------------------------<  133<H>  4.2   |  34<H>  |  0.63    Ca    8.7      17 Feb 2023 07:50                CAPILLARY BLOOD GLUCOSE      POCT Blood Glucose.: 111 mg/dL (18 Feb 2023 08:07)  POCT Blood Glucose.: 121 mg/dL (17 Feb 2023 21:50)  POCT Blood Glucose.: 112 mg/dL (17 Feb 2023 16:46)  POCT Blood Glucose.: 182 mg/dL (17 Feb 2023 11:41)        RADIOLOGY & ADDITIONAL TESTS:    CXR:    Ct scan chest:    ekg;    echo: Patient is a 89y old  Female who presents with a chief complaint of AHRF (17 Feb 2023 15:08)  Awake, alert, and clinically unchanged from previous day. Awaiting for home delivery of NIV equipment    INTERVAL HPI/OVERNIGHT EVENTS:      VITAL SIGNS:  T(F): 97.2 (02-18-23 @ 05:00)  HR: 75 (02-18-23 @ 05:00)  BP: 130/90 (02-18-23 @ 05:00)  RR: 20 (02-18-23 @ 05:00)  SpO2: 100% (02-18-23 @ 05:00)  Wt(kg): --  I&O's Detail    17 Feb 2023 07:01  -  18 Feb 2023 07:00  --------------------------------------------------------  IN:  Total IN: 0 mL    OUT:    Voided (mL): 950 mL  Total OUT: 950 mL    Total NET: -950 mL              REVIEW OF SYSTEMS:    CONSTITUTIONAL:  No fevers, chills, sweats    HEENT:  Eyes:  No diplopia or blurred vision. ENT:  No earache, sore throat or runny nose.    CARDIOVASCULAR:  No pressure, squeezing, tightness, or heaviness about the chest; no palpitations.    RESPIRATORY:  Per HPI    GASTROINTESTINAL:  No abdominal pain, nausea, vomiting or diarrhea.    GENITOURINARY:  No dysuria, frequency or urgency.    NEUROLOGIC:  No paresthesias, fasciculations, seizures or weakness.    PSYCHIATRIC:  No disorder of thought or mood.      PHYSICAL EXAM:    Constitutional: Well developed and nourished  Eyes:Perrla  ENMT: normal  Neck:supple  Respiratory: good air entry  Cardiovascular: S1 S2 regular  Gastrointestinal: Soft, Non tender  Extremities: No edema  Vascular:normal  Neurological:Awake, alert,Ox3  Musculoskeletal:Normal      MEDICATIONS  (STANDING):  albuterol/ipratropium for Nebulization 3 milliLiter(s) Nebulizer every 6 hours  anastrozole 1 milliGRAM(s) Oral daily  apixaban 5 milliGRAM(s) Oral every 12 hours  artificial  tears Solution 1 Drop(s) Both EYES two times a day  carvedilol 6.25 milliGRAM(s) Oral every 12 hours  chlorhexidine 2% Cloths 1 Application(s) Topical <User Schedule>  insulin lispro (ADMELOG) corrective regimen sliding scale   SubCutaneous three times a day with meals  insulin lispro (ADMELOG) corrective regimen sliding scale   SubCutaneous at bedtime  pantoprazole    Tablet 40 milliGRAM(s) Oral before breakfast  simvastatin 20 milliGRAM(s) Oral at bedtime    MEDICATIONS  (PRN):  guaiFENesin Oral Liquid (Sugar-Free) 200 milliGRAM(s) Oral every 6 hours PRN Cough      Allergies    losartan (Angioedema)    Intolerances    Chicken (Stomach Upset)      LABS:                        13.0   6.01  )-----------( 203      ( 17 Feb 2023 07:50 )             42.6     02-17    143  |  104  |  7   ----------------------------<  133<H>  4.2   |  34<H>  |  0.63    Ca    8.7      17 Feb 2023 07:50                CAPILLARY BLOOD GLUCOSE      POCT Blood Glucose.: 111 mg/dL (18 Feb 2023 08:07)  POCT Blood Glucose.: 121 mg/dL (17 Feb 2023 21:50)  POCT Blood Glucose.: 112 mg/dL (17 Feb 2023 16:46)  POCT Blood Glucose.: 182 mg/dL (17 Feb 2023 11:41)        RADIOLOGY & ADDITIONAL TESTS:    CXR:    Ct scan chest:    ekg;    echo:

## 2023-02-18 NOTE — PROGRESS NOTE ADULT - PROBLEM SELECTOR PLAN 9
DVT and GI prophylaxis.  Failed BIPAP and AVAPS therapy. Remained hypercapnic on both. PCO2 57-84. Will need Trilogy/AVAPS-AE at home to adequately manage persistent hypercapnia. Without Trilogy/AVAPS-AE patient will remain hypercapnic increasing the possibility of readmission and intubation.  Continue bronchodilators. Steroids completed.  Has home oxygen.  Trilogy ordered 2/07/23.  Approved 2/14  Awaiting final auth.  Medically stable for discharge once Trilogy is delivered. Machine approved. Still waiting for final auth.  spoke with granddaughter - aware of situation, all questions answered.

## 2023-02-19 LAB
GLUCOSE BLDC GLUCOMTR-MCNC: 122 MG/DL — HIGH (ref 70–99)
GLUCOSE BLDC GLUCOMTR-MCNC: 136 MG/DL — HIGH (ref 70–99)
GLUCOSE BLDC GLUCOMTR-MCNC: 146 MG/DL — HIGH (ref 70–99)
GLUCOSE BLDC GLUCOMTR-MCNC: 178 MG/DL — HIGH (ref 70–99)

## 2023-02-19 PROCEDURE — 99232 SBSQ HOSP IP/OBS MODERATE 35: CPT

## 2023-02-19 RX ADMIN — Medication 3 MILLILITER(S): at 08:59

## 2023-02-19 RX ADMIN — ANASTROZOLE 1 MILLIGRAM(S): 1 TABLET ORAL at 11:05

## 2023-02-19 RX ADMIN — CARVEDILOL PHOSPHATE 6.25 MILLIGRAM(S): 80 CAPSULE, EXTENDED RELEASE ORAL at 17:42

## 2023-02-19 RX ADMIN — PANTOPRAZOLE SODIUM 40 MILLIGRAM(S): 20 TABLET, DELAYED RELEASE ORAL at 07:59

## 2023-02-19 RX ADMIN — Medication 1 DROP(S): at 06:43

## 2023-02-19 RX ADMIN — SIMVASTATIN 20 MILLIGRAM(S): 20 TABLET, FILM COATED ORAL at 21:13

## 2023-02-19 RX ADMIN — Medication 3 MILLILITER(S): at 15:32

## 2023-02-19 RX ADMIN — Medication 1 DROP(S): at 17:42

## 2023-02-19 RX ADMIN — Medication 3 MILLILITER(S): at 03:42

## 2023-02-19 RX ADMIN — Medication 3 MILLILITER(S): at 20:57

## 2023-02-19 RX ADMIN — APIXABAN 5 MILLIGRAM(S): 2.5 TABLET, FILM COATED ORAL at 17:42

## 2023-02-19 RX ADMIN — Medication 1: at 11:50

## 2023-02-19 RX ADMIN — CARVEDILOL PHOSPHATE 6.25 MILLIGRAM(S): 80 CAPSULE, EXTENDED RELEASE ORAL at 06:42

## 2023-02-19 RX ADMIN — APIXABAN 5 MILLIGRAM(S): 2.5 TABLET, FILM COATED ORAL at 06:42

## 2023-02-19 RX ADMIN — CHLORHEXIDINE GLUCONATE 1 APPLICATION(S): 213 SOLUTION TOPICAL at 06:42

## 2023-02-19 NOTE — CHART NOTE - NSCHARTNOTEFT_GEN_A_CORE
Assessment:   89yFemalePatient is a 89y old  Female who presents with a chief complaint of AHRF (16 Feb 2023 12:23) Pt Visited. Pt asleep. Pt is on AVAPS @ night and as needed during day time. Pt with fair - Poor appetite.  Will suggest Glucerna shakes BID. Labs Noted.      Factors impacting intake: [ ] none [ ] nausea  [ ] vomiting [ ] diarrhea [ ] constipation  [ ]chewing problems [ ] swallowing issues  [ ] other:     Diet Prescription : Diet, Regular:   Consistent Carbohydrate {No Snacks}  DASH/ TLC  {Sodium & Cholesterol Restricted}  Minced and Moist (MINCEDMOIST)  No Egg (02-10-23 @ 15:12)    Intake:  ~50 -75 % of meal.    Current Weight:   % Weight Change    Pertinent Medications: MEDICATIONS  (STANDING):  albuterol/ipratropium for Nebulization 3 milliLiter(s) Nebulizer every 6 hours  anastrozole 1 milliGRAM(s) Oral daily  apixaban 5 milliGRAM(s) Oral every 12 hours  artificial  tears Solution 1 Drop(s) Both EYES two times a day  carvedilol 6.25 milliGRAM(s) Oral every 12 hours  chlorhexidine 2% Cloths 1 Application(s) Topical <User Schedule>  insulin lispro (ADMELOG) corrective regimen sliding scale   SubCutaneous three times a day with meals  insulin lispro (ADMELOG) corrective regimen sliding scale   SubCutaneous at bedtime  pantoprazole    Tablet 40 milliGRAM(s) Oral before breakfast  simvastatin 20 milliGRAM(s) Oral at bedtime    MEDICATIONS  (PRN):  guaiFENesin Oral Liquid (Sugar-Free) 200 milliGRAM(s) Oral every 6 hours PRN Cough    Pertinent Labs:  02-10 Alb 2.7 g/dL<L>     CAPILLARY BLOOD GLUCOSE      POCT Blood Glucose.: 203 mg/dL (16 Feb 2023 11:23)  POCT Blood Glucose.: 125 mg/dL (16 Feb 2023 07:54)  POCT Blood Glucose.: 128 mg/dL (15 Feb 2023 21:54)  POCT Blood Glucose.: 100 mg/dL (15 Feb 2023 16:42)    Skin:     Estimated Needs:   [ ] no change since previous assessment  [ ] recalculated:     Previous Nutrition Diagnosis:   [ ] Inadequate Energy Intake [x ]Inadequate Oral Intake [ ] Excessive Energy Intake   [ ] Underweight [ ] Increased Nutrient Needs [ ] Overweight/Obesity   [ ] Altered GI Function [ ] Unintended Weight Loss [ ] Food & Nutrition Related Knowledge Deficit [ ] Malnutrition     Nutrition Diagnosis is [x ] ongoing  [ ] resolved [ ] not applicable     New Nutrition Diagnosis: [ ] not applicable       Interventions:   Recommend  [ ] Change Diet To:  [ x] Nutrition Supplement  Glucerna shakes BID.   [ ] Nutrition Support  [ ] Other:     Monitoring and Evaluation:   [ ] PO intake [ x ] Tolerance to diet prescription [ x ] weights [ x ] labs[ x ] follow up per protocol  [ ] other:
EVENT: Family  requesting pt to be sent home and not AMY    BRIEF HPI:  89 yo F, from home, with PMH HTN, COPD, Afib, HFpEF (7/29/22 EF 55-60%, LVH, GIDD, Breast CA, and remote h/o LLE DVT p/w SOB and hypoxia to high 70s, admitted to medicine for AHRF 2/2 Acute diastolic CHF +/- COPD exacerbation and UTI (completed treatment). ICU consulted for concerns of AMS in the setting of Acute on chronic compensated respiratory acidosis requiring new trial of BiPAP. Patient downgraded to AI for further management. Currently on 2L NC. Will need BiPAP Q HS. PT rec AMY 1/31/2023.     Vital Signs Last 24 Hrs  T(C): 37.1 (05 Feb 2023 12:17), Max: 37.1 (05 Feb 2023 12:17)  T(F): 98.7 (05 Feb 2023 12:17), Max: 98.7 (05 Feb 2023 12:17)  HR: 83 (05 Feb 2023 17:30) (70 - 83)  BP: 131/82 (05 Feb 2023 17:30) (119/75 - 140/84)  BP(mean): --  RR: 18 (05 Feb 2023 12:17) (18 - 18)  SpO2: 98% (05 Feb 2023 12:17) (98% - 99%)    Parameters below as of 05 Feb 2023 12:17  Patient On (Oxygen Delivery Method): nasal cannula  O2 Flow (L/min): 2    PLAN  Pt reeval requested, f/u rec
EVENT: Family requesting x-ray for pt c/o chronic pain to right hip    BRIEF HPI: 89 yo F, from home, with PMHx HTN, COPD, Afib, HFpEF (7/29/22 EF 55-60%, LVH, GIDD, Breast CA, and remote h/o LLE DVT p/w SOB and hypoxia to high 70s, admitted to medicine for AHRF 2/2 Acute diastolic CHF +/- COPD exacerbation and UTI (completed treatment). ICU consulted for concerns of AMS in the setting of Acute on chronic compensated respiratory acidosis requiring new trial of BiPAP. Patient downgraded to AI for further management. Currently on 2L NC. BiPAP Q HS.     Vital Signs Last 24 Hrs  T(C): 36.6 (04 Feb 2023 12:00), Max: 36.6 (04 Feb 2023 12:00)  T(F): 97.9 (04 Feb 2023 12:00), Max: 97.9 (04 Feb 2023 12:00)  HR: 78 (04 Feb 2023 17:08) (61 - 91)  BP: 127/73 (04 Feb 2023 13:00) (107/65 - 162/99)  BP(mean): 86 (04 Feb 2023 13:00) (74 - 110)  RR: 18 (04 Feb 2023 14:00) (12 - 18)  SpO2: 97% (04 Feb 2023 17:08) (97% - 100%)    Parameters below as of 04 Feb 2023 12:00    O2 Flow (L/min): 2    FOCUSED PE:  EXT: LROM to LE, 2+ edema, Pedal pulses 2 +  RESP: Even, unlabored  CV: S1 S2, irregular        PLAN:  1 X-ray rt hip routine    FOLLOW UP: Hip  X-ray results
Family updated at bedside on treatment and discharge plans.  Family made aware unable to safely discharge patient until Trilogy  is authorized.  All questions answered.
Family updated at bedside. All questions were answered.
Family updated on test results and treatment plan.  Trilogy approved late this afternoon.  Patient will be discharged tomorrow morning.  All questions answered.
Family updated on treatment plan.  Informed we are awaiting for insurance auth for Trilogy.  All questions answered.
Grandson updated at bedside.  Informed that we still have not received auth for   Trilogy. Grandmother can not be safely discharged without machine.
Met at bedside with pt's grandson and updated on pt's clinical condition and plan of care.   All questions and concerns addressed.     Dorota Carreno NP Medicine /SCU   [id number]/TEAMs
pt was seen and examined  h and p to follow
Case discussed with pt's daughter at bedside and updated her regarding pt's current medical condition.  Plan of care discussed and all questions answered.
Family updated at bedside on test results and treatment plan.  All questions answered.
Family updated on treatment plan  Informed that we are waiting for final auth for trilogy.  All questions answered.
Palliative care will sign off. Please reconsult if needed.
Patient is a 89 yo F, from home, with PMHx HTN, COPD, Afib, HFpEF (7/29/22 EF 55-60%, LVH, GIDD, Breast CA, and remote h/o LLE DVT p/w SOB and hypoxia to high 70s, admitted to medicine for AHRF 2/2 Acute diastolic CHF +/- COPD exacerbation and UTI (completed treatment). Transferred to ICU on 2/3 for concerns of AMS in the setting of Acute on chronic compensated respiratory acidosis requiring new trial of Bipap. On Bipap 2/3-4 overnight, trial off 2/4 am, tolerating NC, more awake and alert. QHS Bipap for now. Transfer to SCU/AI for enhanced respiratory monitoring and NIPPV usage.     For Accepting Team Follow up  - Evaluate patient on QHS BiPAP, could potentially benefit from AVAPS.   - Taper solumedrol down.   - Re-start Lasix on 2/5 if appearing hypervolemic.   - Follow up with palliative care recommendations.     Sign out provided to NP Trish MCKINNEY

## 2023-02-19 NOTE — PROGRESS NOTE ADULT - SUBJECTIVE AND OBJECTIVE BOX
Patient is a 89y old  Female who presents with a chief complaint of AHRF (18 Feb 2023 16:00)  Clinically unchanged from the previous day, and still waiting on delivery of a home BIPAP machine    INTERVAL HPI/OVERNIGHT EVENTS:      VITAL SIGNS:  T(F): 97.8 (02-19-23 @ 04:42)  HR: 60 (02-19-23 @ 04:42)  BP: 128/90 (02-19-23 @ 04:42)  RR: 18 (02-19-23 @ 04:42)  SpO2: 99% (02-19-23 @ 04:42)  Wt(kg): --  I&O's Detail    18 Feb 2023 07:01  -  19 Feb 2023 07:00  --------------------------------------------------------  IN:  Total IN: 0 mL    OUT:    Voided (mL): 800 mL  Total OUT: 800 mL    Total NET: -800 mL              REVIEW OF SYSTEMS:    CONSTITUTIONAL:  No fevers, chills, sweats    HEENT:  Eyes:  No diplopia or blurred vision. ENT:  No earache, sore throat or runny nose.    CARDIOVASCULAR:  No pressure, squeezing, tightness, or heaviness about the chest; no palpitations.    RESPIRATORY:  Per HPI    GASTROINTESTINAL:  No abdominal pain, nausea, vomiting or diarrhea.    GENITOURINARY:  No dysuria, frequency or urgency.    NEUROLOGIC:  No paresthesias, fasciculations, seizures or weakness.    PSYCHIATRIC:  No disorder of thought or mood.      PHYSICAL EXAM:    Constitutional: Well developed and nourished  Eyes:Perrla  ENMT: normal  Neck:supple  Respiratory: good air entry  Cardiovascular: S1 S2 regular  Gastrointestinal: Soft, Non tender  Extremities: No edema  Vascular:normal  Neurological:Awake, alert,Ox3  Musculoskeletal:Normal      MEDICATIONS  (STANDING):  albuterol/ipratropium for Nebulization 3 milliLiter(s) Nebulizer every 6 hours  anastrozole 1 milliGRAM(s) Oral daily  apixaban 5 milliGRAM(s) Oral every 12 hours  artificial  tears Solution 1 Drop(s) Both EYES two times a day  carvedilol 6.25 milliGRAM(s) Oral every 12 hours  chlorhexidine 2% Cloths 1 Application(s) Topical <User Schedule>  insulin lispro (ADMELOG) corrective regimen sliding scale   SubCutaneous three times a day with meals  insulin lispro (ADMELOG) corrective regimen sliding scale   SubCutaneous at bedtime  pantoprazole    Tablet 40 milliGRAM(s) Oral before breakfast  simvastatin 20 milliGRAM(s) Oral at bedtime    MEDICATIONS  (PRN):  guaiFENesin Oral Liquid (Sugar-Free) 200 milliGRAM(s) Oral every 6 hours PRN Cough      Allergies    losartan (Angioedema)    Intolerances    Chicken (Stomach Upset)      LABS:                    CAPILLARY BLOOD GLUCOSE      POCT Blood Glucose.: 122 mg/dL (19 Feb 2023 07:55)  POCT Blood Glucose.: 197 mg/dL (18 Feb 2023 21:50)  POCT Blood Glucose.: 139 mg/dL (18 Feb 2023 17:02)  POCT Blood Glucose.: 144 mg/dL (18 Feb 2023 11:57)        RADIOLOGY & ADDITIONAL TESTS:    CXR:    Ct scan chest:    ekg;    echo:

## 2023-02-19 NOTE — PROGRESS NOTE ADULT - SUBJECTIVE AND OBJECTIVE BOX
CHIEF COMPLAINT:Patient is a 89y old  Female who presents with a chief complaint of AHRF.Pt appears comfortable.    	  REVIEW OF SYSTEMS:  CONSTITUTIONAL: No fever, weight loss, or fatigue  EYES: No eye pain, visual disturbances, or discharge  ENT:  No difficulty hearing, tinnitus, vertigo; No sinus or throat pain  NECK: No pain or stiffness  RESPIRATORY: No cough, wheezing, chills or hemoptysis; No Shortness of Breath  CARDIOVASCULAR: No chest pain, palpitations, passing out, dizziness, or leg swelling  GASTROINTESTINAL: No abdominal or epigastric pain. No nausea, vomiting, or hematemesis; No diarrhea or constipation. No melena or hematochezia.  GENITOURINARY: No dysuria, frequency, hematuria, or incontinence  NEUROLOGICAL: No headaches, memory loss, loss of strength, numbness, or tremors  SKIN: No itching, burning, rashes, or lesions   LYMPH Nodes: No enlarged glands  ENDOCRINE: No heat or cold intolerance; No hair loss  MUSCULOSKELETAL: No joint pain or swelling; No muscle, back, or extremity pain  PSYCHIATRIC: No depression, anxiety, mood swings, or difficulty sleeping  HEME/LYMPH: No easy bruising, or bleeding gums  ALLERGY AND IMMUNOLOGIC: No hives or eczema	      PHYSICAL EXAM:  T(C): 36.4 (02-19-23 @ 12:22), Max: 36.8 (02-18-23 @ 22:18)  HR: 71 (02-19-23 @ 12:22) (60 - 79)  BP: 132/69 (02-19-23 @ 12:22) (123/78 - 132/69)  RR: 18 (02-19-23 @ 12:22) (18 - 18)  SpO2: 100% (02-19-23 @ 12:22) (97% - 100%)  Wt(kg): --  I&O's Summary    18 Feb 2023 07:01  -  19 Feb 2023 07:00  --------------------------------------------------------  IN: 0 mL / OUT: 800 mL / NET: -800 mL        Appearance: Normal	  HEENT:   Normal oral mucosa, PERRL, EOMI	  Lymphatic: No lymphadenopathy  Cardiovascular: Normal S1 S2, No JVD, No murmurs, No edema  Respiratory: Lungs clear to auscultation	  Psychiatry: A & O x 3, Mood & affect appropriate  Gastrointestinal:  Soft, Non-tender, + BS	  Skin: No rashes, No ecchymoses, No cyanosis	  Neurologic: Non-focal  Extremities: Normal range of motion, No clubbing, cyanosis or edema  Vascular: Peripheral pulses palpable 2+ bilaterally    MEDICATIONS  (STANDING):  albuterol/ipratropium for Nebulization 3 milliLiter(s) Nebulizer every 6 hours  anastrozole 1 milliGRAM(s) Oral daily  apixaban 5 milliGRAM(s) Oral every 12 hours  artificial  tears Solution 1 Drop(s) Both EYES two times a day  carvedilol 6.25 milliGRAM(s) Oral every 12 hours  chlorhexidine 2% Cloths 1 Application(s) Topical <User Schedule>  insulin lispro (ADMELOG) corrective regimen sliding scale   SubCutaneous three times a day with meals  insulin lispro (ADMELOG) corrective regimen sliding scale   SubCutaneous at bedtime  pantoprazole    Tablet 40 milliGRAM(s) Oral before breakfast  simvastatin 20 milliGRAM(s) Oral at bedtime        LABS:	 	    proBNP: Serum Pro-Brain Natriuretic Peptide: 3916 pg/mL (01-30 @ 20:20)

## 2023-02-19 NOTE — PROGRESS NOTE ADULT - PROBLEM SELECTOR PLAN 9
DVT and GI prophylaxis.  Failed BIPAP and AVAPS therapy. Remained hypercapnic on both. PCO2 57-84. Will need Trilogy/AVAPS-AE at home to adequately manage persistent hypercapnia. Without Trilogy/AVAPS-AE patient will remain hypercapnic increasing the possibility of readmission and intubation.  Continue bronchodilators. Steroids completed.  Has home oxygen.  Trilogy ordered 2/07/23.  Approved 2/14  Awaiting final auth.  Medically stable for discharge once Trilogy is delivered. Machine approved. Still waiting for final auth.  spoke with multiple family members - aware of situation, all questions answered.

## 2023-02-19 NOTE — CHART NOTE - NSCHARTNOTESELECT_GEN_ALL_CORE
Family Update Note/Event Note
Family Update Note/Event Note
Family update/Event Note
Palliative Care/Event Note
Transfer to AI/SCU/Transfer Note
Event Note
Event Note
Family Update Note/Event Note
Family update/Event Note
Family update/Event Note
Nutrition Services
Pain to right hip/Event Note

## 2023-02-19 NOTE — PROGRESS NOTE ADULT - SUBJECTIVE AND OBJECTIVE BOX
Patient was seen and examined  Patient is a 89y old  Female who presents with a chief complaint of AHRF (19 Feb 2023 12:57)      INTERVAL HPI/OVERNIGHT EVENTS:  T(C): 36.4 (02-19-23 @ 12:22), Max: 36.8 (02-18-23 @ 22:18)  HR: 73 (02-19-23 @ 17:37) (60 - 79)  BP: 141/61 (02-19-23 @ 17:37) (123/78 - 141/61)  RR: 18 (02-19-23 @ 17:37) (18 - 18)  SpO2: 100% (02-19-23 @ 17:37) (97% - 100%)  Wt(kg): --  I&O's Summary    18 Feb 2023 07:01  -  19 Feb 2023 07:00  --------------------------------------------------------  IN: 0 mL / OUT: 800 mL / NET: -800 mL        LABS:              CAPILLARY BLOOD GLUCOSE      POCT Blood Glucose.: 136 mg/dL (19 Feb 2023 16:55)  POCT Blood Glucose.: 178 mg/dL (19 Feb 2023 11:43)  POCT Blood Glucose.: 122 mg/dL (19 Feb 2023 07:55)  POCT Blood Glucose.: 197 mg/dL (18 Feb 2023 21:50)              MEDICATIONS  (STANDING):  albuterol/ipratropium for Nebulization 3 milliLiter(s) Nebulizer every 6 hours  anastrozole 1 milliGRAM(s) Oral daily  apixaban 5 milliGRAM(s) Oral every 12 hours  artificial  tears Solution 1 Drop(s) Both EYES two times a day  carvedilol 6.25 milliGRAM(s) Oral every 12 hours  chlorhexidine 2% Cloths 1 Application(s) Topical <User Schedule>  insulin lispro (ADMELOG) corrective regimen sliding scale   SubCutaneous three times a day with meals  insulin lispro (ADMELOG) corrective regimen sliding scale   SubCutaneous at bedtime  pantoprazole    Tablet 40 milliGRAM(s) Oral before breakfast  simvastatin 20 milliGRAM(s) Oral at bedtime    MEDICATIONS  (PRN):  guaiFENesin Oral Liquid (Sugar-Free) 200 milliGRAM(s) Oral every 6 hours PRN Cough      RADIOLOGY & ADDITIONAL TESTS:    Imaging Personally Reviewed:  [ ] YES  [ ] NO    REVIEW OF SYSTEMS:  CONSTITUTIONAL: No fever, weight loss, or fatigue  EYES: No eye pain, visual disturbances, or discharge  ENMT:  No difficulty hearing, tinnitus, vertigo; No sinus or throat pain  NECK: No pain or stiffness  BREASTS: No pain, masses, or nipple discharge  RESPIRATORY: No cough, wheezing, chills or hemoptysis; No shortness of breath  CARDIOVASCULAR: No chest pain, palpitations, dizziness, or leg swelling  GASTROINTESTINAL: No abdominal or epigastric pain. No nausea, vomiting, or hematemesis; No diarrhea or constipation. No melena or hematochezia.  GENITOURINARY: No dysuria, frequency, hematuria, or incontinence  NEUROLOGICAL: No headaches, memory loss, loss of strength, numbness, or tremors  SKIN: No itching, burning, rashes, or lesions   LYMPH NODES: No enlarged glands  ENDOCRINE: No heat or cold intolerance; No hair loss  MUSCULOSKELETAL: No joint pain or swelling; No muscle, back, or extremity pain  PSYCHIATRIC: No depression, anxiety, mood swings, or difficulty sleeping  HEME/LYMPH: No easy bruising, or bleeding gums  ALLERY AND IMMUNOLOGIC: No hives or eczema      Consultant(s) Notes Reviewed:  [ x ] YES  [ ] NO    PHYSICAL EXAM:  GENERAL: NAD, well-groomed, well-developed  HEAD:  Atraumatic, Normocephalic  EYES: blind  ENMT: No tonsillar erythema, exudates, or enlargement; Moist mucous membranes, Good dentition, No lesions  NECK: Supple, No JVD, Normal thyroid  NERVOUS SYSTEM:  Alert & Oriented X3, Good concentration; Motor Strength 5/5 B/L upper and lower extremities; DTRs 2+ intact and symmetric  CHEST/LUNG: Clear to percussion bilaterally; No rales, rhonchi, wheezing, or rubs  HEART: Regular rate and rhythm; No murmurs, rubs, or gallops  ABDOMEN: Soft, Nontender, Nondistended; Bowel sounds present  EXTREMITIES:  2+ Peripheral Pulses, No clubbing, cyanosis, or edema  LYMPH: No lymphadenopathy noted  SKIN: No rashes or lesions    Care Discussed with Consultants/Other Providers [ x] YES  [ ] NO

## 2023-02-19 NOTE — PROGRESS NOTE ADULT - PROBLEM SELECTOR PLAN 10
Remains Medically stable for discharge.  Have not received authorization for Trilogy.  Trilogy paperwork submitted 2/7. [facility] has stated that they mis-filled request and it may take another 15 days to get machine.  CM working with [facility].  Will call [facility] after holiday break to escalate issue.

## 2023-02-19 NOTE — PROGRESS NOTE ADULT - PROBLEM SELECTOR PLAN 10
Remains Medically stable for discharge.  Have not received authorization for Trilogy.  Trilogy paperwork submitted 2/7. [facility] has stated that they mis-filled request and it may take another 15 days to get machine.  CM working with [facility].  Will call [facility] after holiday break to escalate issue.

## 2023-02-19 NOTE — PROGRESS NOTE ADULT - SUBJECTIVE AND OBJECTIVE BOX
NUNO LAST    SCU progress note    INTERVAL HPI/OVERNIGHT EVENTS: ***No overnight events. Still awaiting auth for Trilogy.    DNR [ ]   DNI  [  ]    FULL CODE    Covid - 19 PCR: Negative 2/14    The 4Ms    What Matters Most: see GOC  Age appropriate Medications/Screen for High Risk Medication: Yes  Mentation: see CAM below  Mobility: defer to physical exam    The Confusion Assessment Method (CAM) Diagnostic Algorithm     1: Acute Onset or Fluctuating Course  - Is there evidence of an acute change in mental status from the patient’s baseline? Did the (abnormal) behavior  fluctuate during the day, that is, tend to come and go, or increase and decrease in severity?  [ ] YES [x ] NO     2: Inattention  - Did the patient have difficulty focusing attention, being easily distractible, or having difficulty keeping track of what was being said?  [ ] YES [x ] NO     3: Disorganized thinking  -Was the patient’s thinking disorganized or incoherent, such as rambling or irrelevant conversation, unclear or illogical flow of ideas, or unpredictable switching from subject to subject?  [ ] YES [x ] NO    4: Altered Level of consciousness?  [ ] YES [x ] NO    The diagnosis of delirium by CAM requires the presence of features 1 and 2 and either 3 or 4.    PRESSORS: [ ] YES [x ] NO    Cardiovascular:  Heart Failure  Acute   Acute on Chronic  Chronic       carvedilol 6.25 milliGRAM(s) Oral every 12 hours    Pulmonary:  albuterol/ipratropium for Nebulization 3 milliLiter(s) Nebulizer every 6 hours  guaiFENesin Oral Liquid (Sugar-Free) 200 milliGRAM(s) Oral every 6 hours PRN    Hematalogic:  apixaban 5 milliGRAM(s) Oral every 12 hours    Other:  anastrozole 1 milliGRAM(s) Oral daily  artificial  tears Solution 1 Drop(s) Both EYES two times a day  chlorhexidine 2% Cloths 1 Application(s) Topical <User Schedule>  insulin lispro (ADMELOG) corrective regimen sliding scale   SubCutaneous three times a day with meals  insulin lispro (ADMELOG) corrective regimen sliding scale   SubCutaneous at bedtime  pantoprazole    Tablet 40 milliGRAM(s) Oral before breakfast  simvastatin 20 milliGRAM(s) Oral at bedtime    albuterol/ipratropium for Nebulization 3 milliLiter(s) Nebulizer every 6 hours  anastrozole 1 milliGRAM(s) Oral daily  apixaban 5 milliGRAM(s) Oral every 12 hours  artificial  tears Solution 1 Drop(s) Both EYES two times a day  carvedilol 6.25 milliGRAM(s) Oral every 12 hours  chlorhexidine 2% Cloths 1 Application(s) Topical <User Schedule>  guaiFENesin Oral Liquid (Sugar-Free) 200 milliGRAM(s) Oral every 6 hours PRN  insulin lispro (ADMELOG) corrective regimen sliding scale   SubCutaneous three times a day with meals  insulin lispro (ADMELOG) corrective regimen sliding scale   SubCutaneous at bedtime  pantoprazole    Tablet 40 milliGRAM(s) Oral before breakfast  simvastatin 20 milliGRAM(s) Oral at bedtime    Drug Dosing Weight  Height (cm): 154.9 (30 Jan 2023 16:23)  Weight (kg): 120 (03 Feb 2023 15:15)  BMI (kg/m2): 50 (03 Feb 2023 15:15)  BSA (m2): 2.13 (03 Feb 2023 15:15)    CENTRAL LINE: [ ] YES [x ] NO  LOCATION:   DATE INSERTED:  REMOVE: [ ] YES [ ] NO  EXPLAIN:    AGUIRRE: [ ] YES [x ] NO    DATE INSERTED:  REMOVE:  [ ] YES [ ] NO  EXPLAIN:    PAST MEDICAL & SURGICAL HISTORY:  Arthritis      Legally blind      Pre-diabetes      Breast cancer  right, no chemo or radiation      COPD exacerbation      Atrial fibrillation      HF (heart failure)  HFpEF 7/29      HTN (hypertension)      Deep vein thrombosis (DVT)  Left Lower Extremity      H/O CHF      No significant past surgical history                  02-18 @ 07:01  -  02-19 @ 07:00  --------------------------------------------------------  IN: 0 mL / OUT: 800 mL / NET: -800 mL            PHYSICAL EXAM:    GENERAL: NAD, well-groomed, well-developed  HEAD:  Atraumatic, Normocephalic  EYES: Left eye deformity  ENMT: No tonsillar erythema, exudates  NECK: Supple, No JVD  NERVOUS SYSTEM:  Alert & Oriented X3, Follows commands, moving all extremities.  CHEST/LUNG: Clear to percussion bilaterally; No rales, rhonchi, wheezing, or rubs  HEART: Regular rate and rhythm; No murmurs, rubs, or gallops  ABDOMEN: Soft, Obese, Nontender, Nondistended; Bowel sounds present  EXTREMITIES:  2+ Peripheral Pulses, No clubbing, cyanosis, or edema  LYMPH: No lymphadenopathy noted  SKIN: No rashes or lesions      LABS:                    [  ]  DVT Prophylaxis  [  ]  Nutrition, Brand, Rate         Goal Rate        Abnormal Nutritional Status -  Malnutrition   Cachexia          RADIOLOGY & ADDITIONAL STUDIES:  ***  < from: CT Chest No Cont (01.31.23 @ 16:36) >  FINDINGS:    Image quality degraded due to extensive respiratory motion.    AIRWAYS, LUNGS, PLEURA: Trachea and mainstem bronchi patent. Biapical   scarring unchanged. Bibasilar atelectasis. No focal lung consolidation.   No pleural effusion.    MEDIASTINUM: Cardiomegaly. No pericardial effusion. Thoracic aorta normal   caliber.  No large mediastinal lymph nodes.    IMAGED ABDOMEN: Unremarkable.    SOFT TISSUES: Unremarkable.    BONES: Unremarkable.      IMPRESSION:.    No focal lung consolidation.    Bibasilar atelectasis.    --- End of Report ---    < end of copied text >  < from: Xray Chest 1 View- PORTABLE-Urgent (01.30.23 @ 22:00) >    FINDINGS:  Heart/Vascular: The mediastinum, hilum and aorta are within normal limits   for projection. The heart appears enlarged despite projection.  Pulmonary: Midline trachea. There are increased interstitial markings   which may represent the patient's underlying COPD or pulmonary venous   congestion. No focal consolidation or gross effusion.    Bones: There is no fracture.  Lines and catheter: None    Impression:    There are increased interstitial markings which may represent the   patient's underlying COPD or pulmonary venous congestion. No focal   consolidation or gross effusion.    --- End of Report ---      < end of copied text >    Goals of Care Discussion with Family/Proxy/Other   - see note from 2/06/23

## 2023-02-20 VITALS
SYSTOLIC BLOOD PRESSURE: 107 MMHG | TEMPERATURE: 98 F | RESPIRATION RATE: 18 BRPM | DIASTOLIC BLOOD PRESSURE: 80 MMHG | OXYGEN SATURATION: 100 % | HEART RATE: 69 BPM

## 2023-02-20 LAB
GLUCOSE BLDC GLUCOMTR-MCNC: 119 MG/DL — HIGH (ref 70–99)
GLUCOSE BLDC GLUCOMTR-MCNC: 165 MG/DL — HIGH (ref 70–99)

## 2023-02-20 PROCEDURE — 85610 PROTHROMBIN TIME: CPT

## 2023-02-20 PROCEDURE — 84145 PROCALCITONIN (PCT): CPT

## 2023-02-20 PROCEDURE — 80053 COMPREHEN METABOLIC PANEL: CPT

## 2023-02-20 PROCEDURE — 94760 N-INVAS EAR/PLS OXIMETRY 1: CPT

## 2023-02-20 PROCEDURE — 82803 BLOOD GASES ANY COMBINATION: CPT

## 2023-02-20 PROCEDURE — 93005 ELECTROCARDIOGRAM TRACING: CPT

## 2023-02-20 PROCEDURE — 87186 SC STD MICRODIL/AGAR DIL: CPT

## 2023-02-20 PROCEDURE — 97161 PT EVAL LOW COMPLEX 20 MIN: CPT

## 2023-02-20 PROCEDURE — 85730 THROMBOPLASTIN TIME PARTIAL: CPT

## 2023-02-20 PROCEDURE — 87637 SARSCOV2&INF A&B&RSV AMP PRB: CPT

## 2023-02-20 PROCEDURE — 83880 ASSAY OF NATRIURETIC PEPTIDE: CPT

## 2023-02-20 PROCEDURE — 85025 COMPLETE CBC W/AUTO DIFF WBC: CPT

## 2023-02-20 PROCEDURE — 83036 HEMOGLOBIN GLYCOSYLATED A1C: CPT

## 2023-02-20 PROCEDURE — 84295 ASSAY OF SERUM SODIUM: CPT

## 2023-02-20 PROCEDURE — 81001 URINALYSIS AUTO W/SCOPE: CPT

## 2023-02-20 PROCEDURE — 87640 STAPH A DNA AMP PROBE: CPT

## 2023-02-20 PROCEDURE — 99285 EMERGENCY DEPT VISIT HI MDM: CPT

## 2023-02-20 PROCEDURE — 84132 ASSAY OF SERUM POTASSIUM: CPT

## 2023-02-20 PROCEDURE — 83605 ASSAY OF LACTIC ACID: CPT

## 2023-02-20 PROCEDURE — 87077 CULTURE AEROBIC IDENTIFY: CPT

## 2023-02-20 PROCEDURE — 84484 ASSAY OF TROPONIN QUANT: CPT

## 2023-02-20 PROCEDURE — 71250 CT THORAX DX C-: CPT

## 2023-02-20 PROCEDURE — 94640 AIRWAY INHALATION TREATMENT: CPT

## 2023-02-20 PROCEDURE — 83735 ASSAY OF MAGNESIUM: CPT

## 2023-02-20 PROCEDURE — 82330 ASSAY OF CALCIUM: CPT

## 2023-02-20 PROCEDURE — 97530 THERAPEUTIC ACTIVITIES: CPT

## 2023-02-20 PROCEDURE — 87635 SARS-COV-2 COVID-19 AMP PRB: CPT

## 2023-02-20 PROCEDURE — 87086 URINE CULTURE/COLONY COUNT: CPT

## 2023-02-20 PROCEDURE — 87040 BLOOD CULTURE FOR BACTERIA: CPT

## 2023-02-20 PROCEDURE — 97110 THERAPEUTIC EXERCISES: CPT

## 2023-02-20 PROCEDURE — 71045 X-RAY EXAM CHEST 1 VIEW: CPT

## 2023-02-20 PROCEDURE — 36415 COLL VENOUS BLD VENIPUNCTURE: CPT

## 2023-02-20 PROCEDURE — 82962 GLUCOSE BLOOD TEST: CPT

## 2023-02-20 PROCEDURE — 80048 BASIC METABOLIC PNL TOTAL CA: CPT

## 2023-02-20 PROCEDURE — 94660 CPAP INITIATION&MGMT: CPT

## 2023-02-20 PROCEDURE — 85027 COMPLETE CBC AUTOMATED: CPT

## 2023-02-20 PROCEDURE — 87641 MR-STAPH DNA AMP PROBE: CPT

## 2023-02-20 PROCEDURE — 84100 ASSAY OF PHOSPHORUS: CPT

## 2023-02-20 RX ADMIN — PANTOPRAZOLE SODIUM 40 MILLIGRAM(S): 20 TABLET, DELAYED RELEASE ORAL at 05:32

## 2023-02-20 RX ADMIN — APIXABAN 5 MILLIGRAM(S): 2.5 TABLET, FILM COATED ORAL at 05:32

## 2023-02-20 RX ADMIN — Medication 1: at 12:01

## 2023-02-20 RX ADMIN — ANASTROZOLE 1 MILLIGRAM(S): 1 TABLET ORAL at 11:20

## 2023-02-20 RX ADMIN — CHLORHEXIDINE GLUCONATE 1 APPLICATION(S): 213 SOLUTION TOPICAL at 06:09

## 2023-02-20 RX ADMIN — Medication 1 DROP(S): at 05:32

## 2023-02-20 RX ADMIN — Medication 3 MILLILITER(S): at 08:23

## 2023-02-20 RX ADMIN — Medication 3 MILLILITER(S): at 03:20

## 2023-02-20 NOTE — PROGRESS NOTE ADULT - PROBLEM SELECTOR PROBLEM 10
Discharge planning issues
DM (diabetes mellitus)
Discharge planning issues
Discharge planning issues
Advance care planning
DM (diabetes mellitus)
Discharge planning issues
DM (diabetes mellitus)
Discharge planning issues
Discharge planning issues
Prophylactic measure
DM (diabetes mellitus)
DM (diabetes mellitus)
Advance care planning
Discharge planning issues
DM (diabetes mellitus)
Discharge planning issues
Advance care planning
Discharge planning issues
At risk for obstructive sleep apnea
Discharge planning issues

## 2023-02-20 NOTE — PROGRESS NOTE ADULT - PROBLEM SELECTOR PROBLEM 1
Acute respiratory failure with hypoxia
Acute respiratory failure with hypoxia
Acute on chronic respiratory failure with hypoxia and hypercapnia
Acute respiratory failure with hypoxia
Acute on chronic respiratory failure with hypoxia and hypercapnia
Acute on chronic respiratory failure with hypoxia and hypercapnia
Acute respiratory failure with hypoxia
Acute respiratory failure with hypoxia
Dyspnea
Acute on chronic respiratory failure with hypoxia and hypercapnia
Acute respiratory failure with hypoxia
Acute on chronic respiratory failure with hypoxia and hypercapnia
Acute respiratory failure with hypoxia
Acute on chronic respiratory failure with hypoxia and hypercapnia
Acute respiratory failure with hypoxia
Acute on chronic respiratory failure with hypoxia and hypercapnia
Acute on chronic respiratory failure with hypoxia and hypercapnia
Acute respiratory failure with hypoxia
Acute on chronic respiratory failure with hypoxia and hypercapnia
Acute respiratory failure with hypoxia
Acute respiratory failure with hypoxia and hypercapnia
Acute on chronic respiratory failure with hypoxia and hypercapnia
Acute respiratory failure with hypoxia
Acute on chronic respiratory failure with hypoxia and hypercapnia
Acute respiratory failure with hypoxia and hypercapnia
Acute on chronic respiratory failure with hypoxia and hypercapnia
Acute respiratory failure with hypoxia
Acute respiratory failure with hypoxia
Acute on chronic respiratory failure with hypoxia and hypercapnia
Acute respiratory failure with hypoxia
Acute on chronic respiratory failure with hypoxia and hypercapnia
Acute respiratory failure with hypoxia
Acute on chronic respiratory failure with hypoxia and hypercapnia
Acute on chronic respiratory failure with hypoxia and hypercapnia
Acute respiratory failure with hypoxia
Acute respiratory failure with hypoxia
Acute on chronic respiratory failure with hypoxia and hypercapnia
Acute on chronic respiratory failure with hypoxia and hypercapnia
Acute respiratory failure with hypoxia
Acute on chronic respiratory failure with hypoxia and hypercapnia
Acute metabolic encephalopathy
Acute respiratory failure with hypoxia

## 2023-02-20 NOTE — PROGRESS NOTE ADULT - PROBLEM SELECTOR PLAN 7
Continue sliding scale coverage  Monitor glucose.
A1c 7.6 Feb 2023.  Not on medications.   Continue Insulin sliding scale  Monitor ACHS.
On Anastrozole daily  Patient to follow up outpatient with Oncologist.
On Anastrozole daily  Patient to follow up outpatient with Oncologist.
lipid panel  statin
A1C 7.4. Not on home regimen  Continue sliding scale coverage while in hospital.  Monitor glucose.
lipid panel  statin
A1C 7.4. Not on home regimen  Continue sliding scale coverage while in hospital.  Monitor glucose.
Lipid panel  Statin
On Anastrozole daily  Patient to follow up outpatient with Oncologist.
Lipid panel  Statin
lipid panel  statin
A1C 7.4. Not on home regimen  Continue sliding scale coverage while in hospital.  Monitor glucose.
A1C 7.4. Not on home regimen  Continue sliding scale coverage while in hospital.  Monitor glucose.
Lipid panel  Statin
Lipid panel  Statin
On Anastrozole daily  Patient to follow up outpatient with Oncologist.
On Anastrozole daily  Patient to follow up outpatient with Oncologist.
Continue sliding scale coverage  Monitor glucose.
A1C 7.4. Not on home regimen  Continue sliding scale coverage while in hospital.  Monitor glucose.
Hem/onc follow up as OP  cont med
Lipid panel  Statin
Lipid panel  Statin
On Anastrozole daily  Patient to follow up outpatient with Oncologist.
Continue sliding scale coverage  Monitor glucose.
Continue statin
Lipid panel  Statin
A1C 7.4. Not on home regimen  Continue sliding scale coverage while in hospital.  Monitor glucose.
On Anastrozole daily  Patient to follow up outpatient with Oncologist.
Pt on Eliquis for dvt ppx  pt has hx of dvt (Eliquis at home).
on home med lasix 40 mg daily  c/w home med carvedilol 6.25 mg bid and lasix 40 mg ivp daily  Bp controlled  (age > 60) goal <150/90
Lipid panel  Statin
On Anastrozole daily  Patient to follow up outpatient with Oncologist.
A1C 7.4. Not on home regimen  Continue sliding scale coverage while in hospital.  Monitor glucose.
Continue sliding scale coverage  Monitor glucose.
On Anastrozole daily  Patient to follow up outpatient with Oncologist.
Pt on Eliquis for dvt ppx  pt has hx of dvt (Eliquis at home).
Continue sliding scale coverage  Monitor glucose.
Hem/onc follow up as OP  cont med
On Anastrozole daily  Patient to follow up outpatient with Oncologist.
Hem/onc follow up as OP  cont med
Lipid panel  Statin
c/w carvedilol (home med).
c/w carvedilol (home med).
A1C 7.4. Not on home regimen  Continue sliding scale coverage while in hospital.  Monitor glucose.
A1C 7.4. Not on home regimen  Continue sliding scale coverage while in hospital.  Monitor glucose.
Lipid panel  Statin
lipid panel  statin
A1c 7.6 Feb 2023.  Not on medications.   Continue Insulin sliding scale  Monitor ACHS.
A1c 7.6 Feb 2023.  Not on medications.   Continue Insulin sliding scale  Monitor ACHS.
Lipid panel  Statin
A1C 7.4. Not on home regimen  Continue sliding scale coverage while in hospital.  Monitor glucose.
Lipid panel  Statin
Lipid panel  Statin
Continue sliding scale coverage  Monitor glucose.

## 2023-02-20 NOTE — PROGRESS NOTE ADULT - NS ATTEND OPT1 GEN_ALL_CORE
I independently performed the documented:
I attest my time as attending is greater than 50% of the total combined time spent on qualifying patient care activities by the PA/NP and attending.
I independently performed the documented:
I attest my time as attending is greater than 50% of the total combined time spent on qualifying patient care activities by the PA/NP and attending.

## 2023-02-20 NOTE — PROGRESS NOTE ADULT - PROBLEM SELECTOR PLAN 4
Cont meds  Cardio Follow up
Ventricular rate controlled.  Continue apixaban
Ventricular rate controlled.  Continue apixaban.
Cont meds  Cardio Follow up
Cont meds  Cardio follow up
Ventricular rate controlled.  Continue apixaban.
cont meds  Cardio follow up
Controlled  monitor BP
cont meds  Cardio follow up
EKG: rate controlled afib  c/w eliquis 5 mg bid and carvedilol 6.25 mg bid  HR controlled
Klebsiella UTI  Completed 3 days of Rocephin.
Ventricular rate controlled.  Continue apixaban.
Cont meds  Cardio Follow up
EKG: rate controlled afib  c/w eliquis and carvedilol.
Ventricular rate controlled.  Continue apixaban.
Ventricular rate controlled.  Continue apixaban.
cont meds  Cardio follow up
Ventricular rate controlled.  Continue apixaban.
C/w Coreg (home med).
Ventricular rate controlled.  Continue apixaban.
cont meds  Cardio follow up
Controlled  monitor BP
EKG: rate controlled afib  c/w eliquis and carvedilol.
Ventricular rate controlled.  Continue apixaban.
cont meds  Cardio follow up
cont meds  Cardio follow up
89 y/o female with multiple chronic and deteriorating conditions. Patient is at high risk for mortality, morbidity, infections, and re-hospitalization.     Patient with good support system at home (Seven very dedicated DTRs and CDPAP in place)  Followed by PCP.    Code Status: FULL CODE.    > May become hospice eligible if/when chronic conditions deteriorate or complicate by infections and/or other events, including re-hospitalization(s).    Palliative Care will sign off. Please reconsult as needed.
Ventricular rate controlled.  Continue apixaban.
cont meds  Cardio follow up
Ventricular rate controlled.  Continue apixaban.
Ventricular rate controlled.  Continue apixaban.
cont meds  Cardio follow up
C/w Coreg (home med).
Cont meds  Cardio Follow up
Ventricular rate controlled.  Continue apixaban.
Ventricular rate controlled.  Continue apixaban.
C/w Coreg (home med).
Ventricular rate controlled.  Continue apixaban.
Ventricular rate controlled.  Continue apixaban.
Controlled  monitor BP
Ventricular rate controlled.  Continue apixaban.
Cont meds  Cardio Follow up
Controlled  monitor BP
Ventricular rate controlled.  Continue apixaban.

## 2023-02-20 NOTE — PROGRESS NOTE ADULT - PROBLEM SELECTOR PROBLEM 7
HLD (hyperlipidemia)
Prophylactic measure
DM (diabetes mellitus)
DM (diabetes mellitus)
HLD (hyperlipidemia)
Breast cancer
Breast cancer
DM (diabetes mellitus)
HTN (hypertension)
Prophylactic measure
HLD (hyperlipidemia)
Breast cancer
DM (diabetes mellitus)
DM (diabetes mellitus)
Breast cancer
HLD (hyperlipidemia)
Breast cancer
DM (diabetes mellitus)
HLD (hyperlipidemia)
HLD (hyperlipidemia)
DM (diabetes mellitus)
HLD (hyperlipidemia)
HTN (hypertension)
DM (diabetes mellitus)
DM (diabetes mellitus)
HLD (hyperlipidemia)
Breast cancer
Breast cancer
DM (diabetes mellitus)
HLD (hyperlipidemia)
Breast cancer
DM (diabetes mellitus)
HLD (hyperlipidemia)
Breast cancer
HLD (hyperlipidemia)
DM (diabetes mellitus)
DM (diabetes mellitus)
HLD (hyperlipidemia)
Breast cancer
Breast cancer
HLD (hyperlipidemia)
HLD (hyperlipidemia)
Breast cancer
Breast cancer
HTN (hypertension)

## 2023-02-20 NOTE — PROGRESS NOTE ADULT - COVID-19 NEGATIVE LAB RESULT
COVID-19 ruled out

## 2023-02-20 NOTE — PROGRESS NOTE ADULT - SUBJECTIVE AND OBJECTIVE BOX
NUNO LAST    SCU progress note    INTERVAL HPI/OVERNIGHT EVENTS: ***No overnight events. Still awaiting auth for Trilogy.    DNR [ ]   DNI  [  ]   FULL CODE    Covid - 19 PCR: negative 2/14    The 4Ms    What Matters Most: see GOC  Age appropriate Medications/Screen for High Risk Medication: Yes  Mentation: see CAM below  Mobility: defer to physical exam    The Confusion Assessment Method (CAM) Diagnostic Algorithm     1: Acute Onset or Fluctuating Course  - Is there evidence of an acute change in mental status from the patient’s baseline? Did the (abnormal) behavior  fluctuate during the day, that is, tend to come and go, or increase and decrease in severity?  [ ] YES [x ] NO     2: Inattention  - Did the patient have difficulty focusing attention, being easily distractible, or having difficulty keeping track of what was being said?  [ ] YES [x ] NO     3: Disorganized thinking  -Was the patient’s thinking disorganized or incoherent, such as rambling or irrelevant conversation, unclear or illogical flow of ideas, or unpredictable switching from subject to subject?  [ ] YES [x ] NO    4: Altered Level of consciousness?  [ ] YES [x ] NO    The diagnosis of delirium by CAM requires the presence of features 1 and 2 and either 3 or 4.    PRESSORS: [ ] YES [x ] NO    Cardiovascular:  Heart Failure  Acute   Acute on Chronic  Chronic       carvedilol 6.25 milliGRAM(s) Oral every 12 hours    Pulmonary:  albuterol/ipratropium for Nebulization 3 milliLiter(s) Nebulizer every 6 hours  guaiFENesin Oral Liquid (Sugar-Free) 200 milliGRAM(s) Oral every 6 hours PRN    Hematalogic:  apixaban 5 milliGRAM(s) Oral every 12 hours    Other:  anastrozole 1 milliGRAM(s) Oral daily  artificial  tears Solution 1 Drop(s) Both EYES two times a day  chlorhexidine 2% Cloths 1 Application(s) Topical <User Schedule>  insulin lispro (ADMELOG) corrective regimen sliding scale   SubCutaneous three times a day with meals  insulin lispro (ADMELOG) corrective regimen sliding scale   SubCutaneous at bedtime  pantoprazole    Tablet 40 milliGRAM(s) Oral before breakfast  simvastatin 20 milliGRAM(s) Oral at bedtime    albuterol/ipratropium for Nebulization 3 milliLiter(s) Nebulizer every 6 hours  anastrozole 1 milliGRAM(s) Oral daily  apixaban 5 milliGRAM(s) Oral every 12 hours  artificial  tears Solution 1 Drop(s) Both EYES two times a day  carvedilol 6.25 milliGRAM(s) Oral every 12 hours  chlorhexidine 2% Cloths 1 Application(s) Topical <User Schedule>  guaiFENesin Oral Liquid (Sugar-Free) 200 milliGRAM(s) Oral every 6 hours PRN  insulin lispro (ADMELOG) corrective regimen sliding scale   SubCutaneous three times a day with meals  insulin lispro (ADMELOG) corrective regimen sliding scale   SubCutaneous at bedtime  pantoprazole    Tablet 40 milliGRAM(s) Oral before breakfast  simvastatin 20 milliGRAM(s) Oral at bedtime    Drug Dosing Weight  Height (cm): 154.9 (30 Jan 2023 16:23)  Weight (kg): 120 (03 Feb 2023 15:15)  BMI (kg/m2): 50 (03 Feb 2023 15:15)  BSA (m2): 2.13 (03 Feb 2023 15:15)    CENTRAL LINE: [ ] YES [x ] NO  LOCATION:   DATE INSERTED:  REMOVE: [ ] YES [ ] NO  EXPLAIN:    AGUIRRE: [ ] YES [x ] NO    DATE INSERTED:  REMOVE:  [ ] YES [ ] NO  EXPLAIN:    PAST MEDICAL & SURGICAL HISTORY:  Arthritis      Legally blind      Pre-diabetes      Breast cancer  right, no chemo or radiation      COPD exacerbation      Atrial fibrillation      HF (heart failure)  HFpEF 7/29      HTN (hypertension)      Deep vein thrombosis (DVT)  Left Lower Extremity      H/O CHF      No significant past surgical history                  02-19 @ 07:01  -  02-20 @ 07:00  --------------------------------------------------------  IN: 0 mL / OUT: 1100 mL / NET: -1100 mL            PHYSICAL EXAM:    GENERAL: NAD, well-groomed, well-developed  HEAD:  Atraumatic, Normocephalic  EYES: Left eye deformity  ENMT: No tonsillar erythema, exudates  NECK: Supple, No JVD  NERVOUS SYSTEM:  Alert & Oriented X3, Follows commands, moving all extremities.  CHEST/LUNG: Clear to percussion bilaterally; No rales, rhonchi, wheezing, or rubs  HEART: Regular rate and rhythm; No murmurs, rubs, or gallops  ABDOMEN: Soft, Obese, Nontender, Nondistended; Bowel sounds present  EXTREMITIES:  2+ Peripheral Pulses, No clubbing, cyanosis, or edema  LYMPH: No lymphadenopathy noted  SKIN: No rashes or lesions      LABS:                    [  ]  DVT Prophylaxis  [  ]  Nutrition, Brand, Rate         Goal Rate        Abnormal Nutritional Status -  Malnutrition   Cachexia         RADIOLOGY & ADDITIONAL STUDIES:  ***  < from: CT Chest No Cont (01.31.23 @ 16:36) >  EXAMINATION: CT CHEST was performed without IV contrast.    COMPARISON: 7/28/2022.    FINDINGS:    Image quality degraded due to extensive respiratory motion.    AIRWAYS, LUNGS, PLEURA: Trachea and mainstem bronchi patent. Biapical   scarring unchanged. Bibasilar atelectasis. No focal lung consolidation.   No pleural effusion.    MEDIASTINUM: Cardiomegaly. No pericardial effusion. Thoracic aorta normal   caliber.  No large mediastinal lymph nodes.    IMAGED ABDOMEN: Unremarkable.    SOFT TISSUES: Unremarkable.    BONES: Unremarkable.      IMPRESSION:.    No focal lung consolidation.    Bibasilar atelectasis.    --- End of Report ---    < end of copied text >    Goals of Care Discussion with Family/Proxy/Other   - see note from 2/06/23

## 2023-02-20 NOTE — PROGRESS NOTE ADULT - NS ATTEND OPT1A GEN_ALL_CORE
History/Exam/Medical decision making
Medical decision making

## 2023-02-20 NOTE — PROGRESS NOTE ADULT - SUBJECTIVE AND OBJECTIVE BOX
Patient is a 89y old  Female who presents with a chief complaint of AHRF (20 Feb 2023 08:55)  Awake, alert, comfortable in bed in NAD. Currently on oxygen supp via NC. Awaiting for NIV at home prior to DC    INTERVAL HPI/OVERNIGHT EVENTS:      VITAL SIGNS:  T(F): 97.5 (02-20-23 @ 05:46)  HR: 69 (02-20-23 @ 05:46)  BP: 107/80 (02-20-23 @ 05:46)  RR: 18 (02-20-23 @ 05:46)  SpO2: 100% (02-20-23 @ 05:46)  Wt(kg): --  I&O's Detail    19 Feb 2023 07:01  -  20 Feb 2023 07:00  --------------------------------------------------------  IN:  Total IN: 0 mL    OUT:    Voided (mL): 1100 mL  Total OUT: 1100 mL    Total NET: -1100 mL              REVIEW OF SYSTEMS:    CONSTITUTIONAL:  No fevers, chills, sweats    HEENT:  Eyes:  No diplopia or blurred vision. ENT:  No earache, sore throat or runny nose.    CARDIOVASCULAR:  No pressure, squeezing, tightness, or heaviness about the chest; no palpitations.    RESPIRATORY:  Per HPI    GASTROINTESTINAL:  No abdominal pain, nausea, vomiting or diarrhea.    GENITOURINARY:  No dysuria, frequency or urgency.    NEUROLOGIC:  No paresthesias, fasciculations, seizures or weakness.    PSYCHIATRIC:  No disorder of thought or mood.      PHYSICAL EXAM:    Constitutional: Well developed and nourished  Eyes:Perrla  ENMT: normal  Neck:supple  Respiratory: good air entry  Cardiovascular: S1 S2 regular  Gastrointestinal: Soft, Non tender  Extremities: No edema  Vascular:normal  Neurological:Awake, alert,Ox3  Musculoskeletal:Normal      MEDICATIONS  (STANDING):  albuterol/ipratropium for Nebulization 3 milliLiter(s) Nebulizer every 6 hours  anastrozole 1 milliGRAM(s) Oral daily  apixaban 5 milliGRAM(s) Oral every 12 hours  artificial  tears Solution 1 Drop(s) Both EYES two times a day  carvedilol 6.25 milliGRAM(s) Oral every 12 hours  chlorhexidine 2% Cloths 1 Application(s) Topical <User Schedule>  insulin lispro (ADMELOG) corrective regimen sliding scale   SubCutaneous three times a day with meals  insulin lispro (ADMELOG) corrective regimen sliding scale   SubCutaneous at bedtime  pantoprazole    Tablet 40 milliGRAM(s) Oral before breakfast  simvastatin 20 milliGRAM(s) Oral at bedtime    MEDICATIONS  (PRN):  guaiFENesin Oral Liquid (Sugar-Free) 200 milliGRAM(s) Oral every 6 hours PRN Cough      Allergies    losartan (Angioedema)    Intolerances    Chicken (Stomach Upset)      LABS:                    CAPILLARY BLOOD GLUCOSE      POCT Blood Glucose.: 119 mg/dL (20 Feb 2023 07:59)  POCT Blood Glucose.: 146 mg/dL (19 Feb 2023 21:58)  POCT Blood Glucose.: 136 mg/dL (19 Feb 2023 16:55)  POCT Blood Glucose.: 178 mg/dL (19 Feb 2023 11:43)        RADIOLOGY & ADDITIONAL TESTS:    CXR:    Ct scan chest:    ekg;    echo:

## 2023-02-20 NOTE — PROGRESS NOTE ADULT - PROBLEM SELECTOR PROBLEM 6
Acute UTI
Breast cancer
Acute UTI
Breast cancer
Hyperkalemia
Hyperkalemia
Acute UTI
DM (diabetes mellitus)
DM (diabetes mellitus)
HTN (hypertension)
Acute UTI
Acute UTI
Acute on chronic congestive heart failure
HLD (hyperlipidemia)
DM (diabetes mellitus)
DM (diabetes mellitus)
Acute UTI
Acute UTI
Acute on chronic congestive heart failure
Breast cancer
HLD (hyperlipidemia)
HTN (hypertension)
Acute UTI
Acute UTI
Breast cancer
Acute UTI
Breast cancer
Breast cancer
DM (diabetes mellitus)
Breast cancer
Acute UTI
Breast cancer
Breast cancer
Hyperkalemia
Acute UTI
Breast cancer
Acute UTI
Atrial fibrillation
DM (diabetes mellitus)
Acute UTI
DM (diabetes mellitus)
Acute on chronic congestive heart failure
Breast cancer
Breast cancer
DM (diabetes mellitus)
DM (diabetes mellitus)
HLD (hyperlipidemia)
Breast cancer
Breast cancer
DM (diabetes mellitus)

## 2023-02-20 NOTE — PROGRESS NOTE ADULT - SUBJECTIVE AND OBJECTIVE BOX
CHIEF COMPLAINT:Patient is a 89y old  Female who presents with a chief complaint of AHRF .Pt appears comfortable.    	  REVIEW OF SYSTEMS:  CONSTITUTIONAL: No fever, weight loss, or fatigue  EYES: No eye pain, visual disturbances, or discharge  ENT:  No difficulty hearing, tinnitus, vertigo; No sinus or throat pain  NECK: No pain or stiffness  RESPIRATORY: No cough, wheezing, chills or hemoptysis; No Shortness of Breath  CARDIOVASCULAR: No chest pain, palpitations, passing out, dizziness, or leg swelling  GASTROINTESTINAL: No abdominal or epigastric pain. No nausea, vomiting, or hematemesis; No diarrhea or constipation. No melena or hematochezia.  GENITOURINARY: No dysuria, frequency, hematuria, or incontinence  NEUROLOGICAL: No headaches, memory loss, loss of strength, numbness, or tremors  SKIN: No itching, burning, rashes, or lesions   LYMPH Nodes: No enlarged glands  ENDOCRINE: No heat or cold intolerance; No hair loss  MUSCULOSKELETAL: No joint pain or swelling; No muscle, back, or extremity pain  PSYCHIATRIC: No depression, anxiety, mood swings, or difficulty sleeping  HEME/LYMPH: No easy bruising, or bleeding gums  ALLERGY AND IMMUNOLOGIC: No hives or eczema	      PHYSICAL EXAM:  T(C): 36.4 (02-20-23 @ 05:46), Max: 37 (02-19-23 @ 22:22)  HR: 69 (02-20-23 @ 05:46) (69 - 79)  BP: 107/80 (02-20-23 @ 05:46) (107/80 - 156/96)  RR: 18 (02-20-23 @ 05:46) (18 - 18)  SpO2: 100% (02-20-23 @ 05:46) (100% - 100%)  Wt(kg): --  I&O's Summary    19 Feb 2023 07:01  -  20 Feb 2023 07:00  --------------------------------------------------------  IN: 0 mL / OUT: 1100 mL / NET: -1100 mL        Appearance: Normal	  HEENT:   Normal oral mucosa, PERRL, EOMI	  Lymphatic: No lymphadenopathy  Cardiovascular: Normal S1 S2, No JVD, No murmurs, No edema  Respiratory: Lungs clear to auscultation	  Psychiatry: A & O x 3, Mood & affect appropriate  Gastrointestinal:  Soft, Non-tender, + BS	  Skin: No rashes, No ecchymoses, No cyanosis	  Neurologic: Non-focal  Extremities: Normal range of motion, No clubbing, cyanosis or edema  Vascular: Peripheral pulses palpable 2+ bilaterally    MEDICATIONS  (STANDING):  albuterol/ipratropium for Nebulization 3 milliLiter(s) Nebulizer every 6 hours  anastrozole 1 milliGRAM(s) Oral daily  apixaban 5 milliGRAM(s) Oral every 12 hours  artificial  tears Solution 1 Drop(s) Both EYES two times a day  carvedilol 6.25 milliGRAM(s) Oral every 12 hours  chlorhexidine 2% Cloths 1 Application(s) Topical <User Schedule>  insulin lispro (ADMELOG) corrective regimen sliding scale   SubCutaneous three times a day with meals  insulin lispro (ADMELOG) corrective regimen sliding scale   SubCutaneous at bedtime  pantoprazole    Tablet 40 milliGRAM(s) Oral before breakfast  simvastatin 20 milliGRAM(s) Oral at bedtime        LABS:	 	      proBNP: Serum Pro-Brain Natriuretic Peptide: 3916 pg/mL (01-30 @ 20:20)

## 2023-02-20 NOTE — PROGRESS NOTE ADULT - PROBLEM SELECTOR PROBLEM 9
Advance care planning
Prophylactic measure
Breast cancer
At risk for obstructive sleep apnea
Advance care planning
At risk for obstructive sleep apnea
At risk for obstructive sleep apnea
Breast cancer
Advance care planning
At risk for obstructive sleep apnea
At risk for obstructive sleep apnea
Prophylactic measure
At risk for obstructive sleep apnea
Breast cancer
Prophylactic measure
Advance care planning
At risk for obstructive sleep apnea
At risk for obstructive sleep apnea
Prophylactic measure
Prophylactic measure
Advance care planning
At risk for obstructive sleep apnea
Advance care planning
At risk for obstructive sleep apnea
Breast cancer
Advance care planning
At risk for obstructive sleep apnea
Advance care planning
At risk for obstructive sleep apnea
Discharge planning issues
Advance care planning
At risk for obstructive sleep apnea
Advance care planning
Prophylactic measure

## 2023-02-20 NOTE — PROGRESS NOTE ADULT - PROBLEM SELECTOR PROBLEM 8
Breast cancer
Acute metabolic encephalopathy
Acute metabolic encephalopathy
Breast cancer
Acute metabolic encephalopathy
Breast cancer
Acute metabolic encephalopathy
Breast cancer
HLD (hyperlipidemia)
Acute metabolic encephalopathy
Acute metabolic encephalopathy
Breast cancer
Breast cancer
At risk for obstructive sleep apnea
HLD (hyperlipidemia)
Personal history of DVT (deep vein thrombosis)
Personal history of DVT (deep vein thrombosis)
Prophylactic measure
Acute metabolic encephalopathy
Breast cancer
Acute metabolic encephalopathy
Breast cancer
Prophylactic measure
Acute metabolic encephalopathy
At risk for obstructive sleep apnea
Prophylactic measure
Acute metabolic encephalopathy
Breast cancer
Breast cancer
Personal history of DVT (deep vein thrombosis)
Personal history of DVT (deep vein thrombosis)
Breast cancer
Breast cancer
DM (diabetes mellitus)
Acute metabolic encephalopathy
At risk for obstructive sleep apnea
Acute metabolic encephalopathy
Acute metabolic encephalopathy
Breast cancer
HLD (hyperlipidemia)
Personal history of DVT (deep vein thrombosis)
Breast cancer
Acute metabolic encephalopathy
Acute metabolic encephalopathy
Breast cancer
Breast cancer
Acute metabolic encephalopathy
Personal history of DVT (deep vein thrombosis)

## 2023-02-20 NOTE — PROGRESS NOTE ADULT - PROBLEM SELECTOR PLAN 8
c/w Christina
Resolved.  Secondary to hypercapnia.  Continue AVAPS nightly.
c/w Christina
C/w statin
Patient is morbidly obese  Needs Sleep study and PFTs as OP
C/w statin
Cont meds  Hem/Onc follow up
Patient is morbidly obese  Needs Sleep study and PFTs as OP
Resolved.  Secondary to hypercapnia.  Continue AVAPS nightly.
Hem/onc follow up as OP  cont med
Resolved.  Secondary to hypercapnia.  Continue AVAPS nightly.
Hem/onc follow up
Resolved.  Secondary to hypercapnia.  Continue AVAPS nightly.
Hem/onc follow up
Hem/onc follow up
Hem/onc follow up as OP  cont med
Hem/onc follow up
Resolved.  Secondary to hypercapnia.  Continue AVAPS nightly.
Resolved.  Secondary to hypercapnia.  Continue AVAPS nightly.
Hem/onc follow up
c/w Christina
Patient is morbidly obese  Needs Sleep study and PFTs as OP
Resolved.  Secondary to hypercapnia.  Continue AVAPS nightly.
A1C 7.4. Not on home regimen  Continue glucose monitoring and coverage with Admelog s/s
Hem/onc follow up as OP  cont med
Pt on Eliquis for dvt ppx, hx      PT recc AMY
Resolved.  Secondary to hypercapnia.  Continue AVAPS nightly.
Resolved.  Secondary to hypercapnia.  Continue AVAPS nightly.
Hem/onc follow up
Resolved.  Secondary to hypercapnia.  Continue AVAPS nightly.
Hem/onc follow up
Resolved.  Secondary to hypercapnia.  Continue AVAPS nightly.
Resolved.  Secondary to hypercapnia.  Continue AVAPS nightly.
c/w Christina
DVT - eliquis  gi - tolerating po
Resolved.  Secondary to hypercapnia.  Continue AVAPS nightly.
Pt on Eliquis for dvt ppx, hx      PT recc AMY
Hem/onc follow up
Resolved.  Secondary to hypercapnia.  Continue AVAPS nightly.
Hem/onc follow up
c/w Christina
Cont meds  Hem/Onc follow up
Resolved.  Secondary to hypercapnia.  Continue AVAPS nightly.
c/w Christina
hem/onc follow up
C/w statin
Hem/onc follow up
Hem/onc follow up
Was likely secondary to hypercapnia.  Resolved.
Resolved.  Secondary to hypercapnia.  Continue AVAPS nightly.

## 2023-02-20 NOTE — PROGRESS NOTE ADULT - REASON FOR ADMISSION
AHRF

## 2023-02-20 NOTE — PROGRESS NOTE ADULT - PROBLEM SELECTOR PLAN 5
Cont meds  Cardio follow up
EKG showing Afib, rate controlled  C/w Eliquis and Coreg.
cont meds  cardio Follow up
cont meds  Cardio follow up
Controlled  Continue Coreg.
Controlled  Continue Coreg.
cont meds  cardio Follow up
Controlled  Continue Coreg.
cont meds  Cardio follow up
Controlled  Continue Coreg.
Controlled  Continue Coreg.
exacerbation resolved  c/w prednisone taper  c/w bronchodilators  Oxygen support to maintain saturation > 90%
exacerbation resolved  c/w prednisone taper  c/w bronchodilators  Oxygen support to maintain saturation > 90%
Controlled  Continue Coreg.
Controlled  Continue Coreg.
cont meds  Cardio follow up
Controlled  Continue Coreg
Controlled  Continue Coreg.
UA +, ucx growing klebsiella  s/p ceftriaxone x3 doses  blood cultures negative
cont meds  Cardio follow up
Controlled  Continue Coreg.
cont meds  cardio Follow up
Controlled  Continue Coreg.
Controlled  Continue Coreg.
cont meds  Cardio follow up
cont meds  cardio Follow up
Controlled  Continue Coreg.
cont meds  Cardio follow up
cont meds  cardio Follow up
exacerbation resolved  c/w prednisone taper  c/w bronchodilators  Oxygen support to maintain saturation > 90%
cont meds  cardio Follow up
Controlled  Continue Coreg.
K+ 5.6 on admission  EKG:   s/p lasix in ED  s/p 5u insulin and d50  resolved  monitor BMP.
UA +, ucx growing klebsiella  c/w CFTX, f/u sensitivity  f/u final Bcx result sent on 1/30/23  ID Dr. Denton
exacerbation resolved  c/w prednisone taper  c/w bronchodilators  Oxygen support to maintain saturation > 90%
K+ 5.6 on admission  EKG:   s/p lasix in ED  s/p 5u insulin and d50  resolved  monitor BMP.
Cont meds  Cardio follow up
Controlled  Continue Coreg.
cont meds  cardio Follow up
UA +, ucx growing klebsiella  c/w CFTX, f/u sensitivity  f/u final Bcx result sent on 1/30/23  ID Dr. Denton
cont meds  Cardio follow up
exacerbation resolved  c/w prednisone taper  c/w bronchodilators  Oxygen support to maintain saturation > 90%
Controlled  Continue Coreg.
cont meds  Cardio follow up
Controlled  Continue Coreg.
EKG showing Afib, rate controlled  C/w Eliquis and Coreg.
Controlled  Continue Coreg.
EKG showing Afib, rate controlled  C/w Eliquis and Coreg.
Controlled  Continue Coreg.
exacerbation resolved  c/w prednisone taper  c/w bronchodilators  Oxygen support to maintain saturation > 90%

## 2023-02-20 NOTE — PROGRESS NOTE ADULT - PROVIDER SPECIALTY LIST ADULT
Infectious Disease
Cardiology
Critical Care
Critical Care
Infectious Disease
Internal Medicine
Internal Medicine
Cardiology
Infectious Disease
Internal Medicine
Palliative Care
Pulmonology
Cardiology
Critical Care
Infectious Disease
Internal Medicine
Internal Medicine
Pulmonology
Pulmonology
Critical Care
Critical Care
Internal Medicine
Critical Care
Internal Medicine
Pulmonology
Critical Care
Pulmonology
Critical Care
Pulmonology
Internal Medicine
Pulmonology
Critical Care
Critical Care
Internal Medicine
Pulmonology
Internal Medicine
Pulmonology
Pulmonology
Internal Medicine
Critical Care
Internal Medicine
Critical Care
Critical Care
Internal Medicine
Critical Care
Internal Medicine
Critical Care
Pulmonology
Critical Care
Internal Medicine
Pulmonology
Pulmonology
Internal Medicine

## 2023-02-20 NOTE — PROGRESS NOTE ADULT - NS ATTEND AMEND GEN_ALL_CORE FT
pt was seen and examined  agree with above
Comfortable on room air oxygen   Appears euvolemic at this time  Taper steroids  ABG improving with NIV support   Will plan for NIV support nocturnally  Oxygen support to maintain saturation > 90%   Maintain euvolemia  Discussed with family at bedside  Discharge planning to home
Comfortable on room air oxygen   Appears euvolemic at this time  Taper steroids as ordered  Cont. nocturnal NIV support  Oxygen support to maintain saturation > 90%   Maintain euvolemia  Discharge planning to home with nocturnal NIV
Comfortable on room air oxygen   Cont. nocturnal NIV support  Oxygen support to maintain saturation > 90%   Document room air saturation  Maintain euvolemia  Discharge planning to home with nocturnal NIV awaiting approval
Comfortable on room air oxygen   Cont. nocturnal NIV support  Oxygen support to maintain saturation > 90%   Document room air saturation  Maintain euvolemia  Discharge planning to home with nocturnal NIV awaiting approval
Comfortable on room air oxygen   Cont. nocturnal NIV support  Oxygen support to maintain saturation > 90%   Document room air saturation  Maintain euvolemia  Discharge planning to home with nocturnal NIV awaiting insurance approval
Comfortable on room air oxygen   Appears euvolemic at this time  Taper steroids  Monitor off NIV support and repeat ABG in AM  Oxygen support to maintain saturation > 90%   Cont. diuresis   Discussed with family at bedside
Comfortable on room air oxygen   Cont. nocturnal NIV support  Oxygen support to maintain saturation > 90%   Document room air saturation  Maintain euvolemia  Discharge planning to home with nocturnal NIV awaiting insurance approval
Comfortable on room air oxygen   Appears euvolemic at this time  Tapering steroids  Cont. nocturnal NIV support  Oxygen support to maintain saturation > 90%   Document room air saturation  Maintain euvolemia  Discharge planning to home with nocturnal NIV
Comfortable on room air oxygen   Cont. nocturnal NIV support  Oxygen support to maintain saturation > 90%   Document room air saturation  Maintain euvolemia  Discharge planning to home with nocturnal NIV awaiting approval
Comfortable on room air oxygen   Tapering steroids as ordered  Cont. nocturnal NIV support  Oxygen support to maintain saturation > 90%   Document room air saturation  Maintain euvolemia  Discharge planning to home with nocturnal NIV awaiting approval
Discharge awaiting delivery for AVAPS
Comfortable on room air oxygen   Appears euvolemic at this time  Taper steroids  Cont. nocturnal NIV support  Oxygen support to maintain saturation > 90%   Document room air saturation  Maintain euvolemia  Discharge planning to home with nocturnal NIV
Comfortable on room air oxygen   Cont. nocturnal NIV support  Oxygen support to maintain saturation > 90%   Document room air saturation  Maintain euvolemia  Discharge planning to home with nocturnal NIV awaiting insurance approval
Comfortable on room air oxygen   Cont. nocturnal NIV support  Oxygen support to maintain saturation > 90%   Document room air saturation  Maintain euvolemia  Discharge planning to home with nocturnal NIV awaiting insurance approval  Multiple calls have been made by pt's family and our team to insurance company to help expedite approval for home equipment and will continue to do so
Comfortable on room air oxygen   Cont. nocturnal NIV support  Oxygen support to maintain saturation > 90%   Document room air saturation  Maintain euvolemia  Discharge planning to home with nocturnal NIV awaiting insurance approval  Multiple calls have been made by pt's family and our team to insurance company to help expedite approval for home equipment and will continue to do so
Problem/Plan - 1:  ·  Problem: Acute metabolic encephalopathy.   ·  Plan: likely 2/2 Hypercapnia  Improving with BiPAP.  Monitor mentation  BiPAP Q HS and as needed.   Avoid sedatives and narcotics.     Problem/Plan - 2:  ·  Problem: Acute respiratory failure with hypoxia.   ·  Plan: Acute on chronic respiratory acidosis and  Acute diastolic heart failure.   COPD treated with PO Prednisone 40 mg x 5 days (converted to solumedrol IV 40mg daily).   Lasix decreased to 20 mg IV daily as patient does not appear in fluid overload at this time.   S/p  Bipap/AVAPS trial in ICU.   Patient now on 2-4L NC and  NIPPV QHS.     Problem/Plan - 3:  ·  Problem: Acute UTI.   ·  Plan: Klebsiella UTI  Completed 3 days of Rocephin.     Problem/Plan - 4:  ·  Problem: HTN (hypertension).   ·  Plan: C/w Coreg (home med).     Problem/Plan - 5:  ·  Problem: Atrial fibrillation.   ·  Plan: EKG showing Afib, rate controlled  C/w Eliquis and Coreg.
Problem/Plan - 1:  ·  Problem: Acute respiratory failure with hypoxia and hypercapnia.   ·  Plan: Acute on chronic respiratory acidosis and  Acute diastolic heart failure.   COPD treated with PO Prednisone 40 mg x 5 days (converted to solumedrol IV 40mg daily).   Lasix decreased to 20 mg IV daily as patient does not appear in fluid overload at this time.  S/p  Bipap/AVAPS trial in ICU.   Patient now on 2-4L NC and  NIPPV QHS.  Continue bronchodilator  Continue IV solumedrol.     Problem/Plan - 2:  ·  Problem: Acute metabolic encephalopathy.   ·  Plan: likely 2/2 Hypercapnia  S/p  Bipap/AVAPS trial in ICU.   Improving on NIV   Continue supplemental oxygenation. Wean as tolerated to keep SpO2 >90%  AVAPS qHS  Avoid sedatives and opioids.     Problem/Plan - 3:  ·  Problem: Acute on chronic congestive heart failure.   ·  Plan: Patient on  Coreg 6.125 mg BID and Lasix 40 mg BID at home.   ECHO July 2022- EF 55-60%, LVH, GIDD, mild pulm HTN  pro-BNP 3916 (prev 1943)  S/p diuresis in ICU  s/p  Lasix 20 mg daily IV  Daily weight   Dr. Sherron diana.

## 2023-02-20 NOTE — PROGRESS NOTE ADULT - PROBLEM SELECTOR PROBLEM 4
Atrial fibrillation
HTN (hypertension)
Atrial fibrillation
HTN (hypertension)
Atrial fibrillation
HTN (hypertension)
Atrial fibrillation
HTN (hypertension)
Atrial fibrillation
HTN (hypertension)
Atrial fibrillation
HTN (hypertension)
Atrial fibrillation
HTN (hypertension)
Atrial fibrillation
HTN (hypertension)
HTN (hypertension)
Atrial fibrillation
Acute UTI
Atrial fibrillation
Encounter for palliative care
Atrial fibrillation

## 2023-04-05 NOTE — DIETITIAN INITIAL EVALUATION ADULT - PERTINENT MEDS FT
MEDICATIONS  (STANDING):  albuterol/ipratropium for Nebulization 3 milliLiter(s) Nebulizer every 6 hours  anastrozole 1 milliGRAM(s) Oral daily  apixaban 5 milliGRAM(s) Oral every 12 hours  artificial  tears Solution 1 Drop(s) Both EYES two times a day  carvedilol 6.25 milliGRAM(s) Oral every 12 hours  chlorhexidine 2% Cloths 1 Application(s) Topical <User Schedule>  insulin lispro (ADMELOG) corrective regimen sliding scale   SubCutaneous every 6 hours  methylPREDNISolone sodium succinate Injectable 40 milliGRAM(s) IV Push daily  pantoprazole  Injectable 40 milliGRAM(s) IV Push daily  simvastatin 20 milliGRAM(s) Oral at bedtime    MEDICATIONS  (PRN):  
Yes

## 2023-04-21 NOTE — PHYSICAL THERAPY INITIAL EVALUATION ADULT - PATIENT/FAMILY/SIGNIFICANT OTHER GOALS STATEMENT, PT EVAL
Return to home. Albendazole Counseling:  I discussed with the patient the risks of albendazole including but not limited to cytopenia, kidney damage, nausea/vomiting and severe allergy.  The patient understands that this medication is being used in an off-label manner.

## 2023-06-03 ENCOUNTER — INPATIENT (INPATIENT)
Facility: HOSPITAL | Age: 88
LOS: 2 days | Discharge: HOME | DRG: 291 | End: 2023-06-06
Attending: INTERNAL MEDICINE | Admitting: INTERNAL MEDICINE
Payer: MEDICARE

## 2023-06-03 VITALS
TEMPERATURE: 98 F | HEIGHT: 66 IN | HEART RATE: 85 BPM | DIASTOLIC BLOOD PRESSURE: 81 MMHG | RESPIRATION RATE: 18 BRPM | SYSTOLIC BLOOD PRESSURE: 111 MMHG | OXYGEN SATURATION: 97 % | WEIGHT: 263.89 LBS

## 2023-06-03 DIAGNOSIS — I10 ESSENTIAL (PRIMARY) HYPERTENSION: ICD-10-CM

## 2023-06-03 DIAGNOSIS — J96.21 ACUTE AND CHRONIC RESPIRATORY FAILURE WITH HYPOXIA: ICD-10-CM

## 2023-06-03 DIAGNOSIS — I48.91 UNSPECIFIED ATRIAL FIBRILLATION: ICD-10-CM

## 2023-06-03 DIAGNOSIS — I50.9 HEART FAILURE, UNSPECIFIED: ICD-10-CM

## 2023-06-03 DIAGNOSIS — Z29.9 ENCOUNTER FOR PROPHYLACTIC MEASURES, UNSPECIFIED: ICD-10-CM

## 2023-06-03 DIAGNOSIS — I24.9 ACUTE ISCHEMIC HEART DISEASE, UNSPECIFIED: ICD-10-CM

## 2023-06-03 DIAGNOSIS — J44.9 CHRONIC OBSTRUCTIVE PULMONARY DISEASE, UNSPECIFIED: ICD-10-CM

## 2023-06-03 PROBLEM — Z86.79 PERSONAL HISTORY OF OTHER DISEASES OF THE CIRCULATORY SYSTEM: Chronic | Status: ACTIVE | Noted: 2023-01-30

## 2023-06-03 LAB
ALBUMIN SERPL ELPH-MCNC: 2.8 G/DL — LOW (ref 3.5–5)
ALP SERPL-CCNC: 102 U/L — SIGNIFICANT CHANGE UP (ref 40–120)
ALT FLD-CCNC: 18 U/L DA — SIGNIFICANT CHANGE UP (ref 10–60)
ANION GAP SERPL CALC-SCNC: 0 MMOL/L — LOW (ref 5–17)
AST SERPL-CCNC: 21 U/L — SIGNIFICANT CHANGE UP (ref 10–40)
BASOPHILS # BLD AUTO: 0.04 K/UL — SIGNIFICANT CHANGE UP (ref 0–0.2)
BASOPHILS NFR BLD AUTO: 0.6 % — SIGNIFICANT CHANGE UP (ref 0–2)
BILIRUB SERPL-MCNC: 0.8 MG/DL — SIGNIFICANT CHANGE UP (ref 0.2–1.2)
BUN SERPL-MCNC: 11 MG/DL — SIGNIFICANT CHANGE UP (ref 7–18)
CALCIUM SERPL-MCNC: 8.5 MG/DL — SIGNIFICANT CHANGE UP (ref 8.4–10.5)
CHLORIDE SERPL-SCNC: 102 MMOL/L — SIGNIFICANT CHANGE UP (ref 96–108)
CO2 SERPL-SCNC: 38 MMOL/L — HIGH (ref 22–31)
CREAT SERPL-MCNC: 0.76 MG/DL — SIGNIFICANT CHANGE UP (ref 0.5–1.3)
EGFR: 74 ML/MIN/1.73M2 — SIGNIFICANT CHANGE UP
EOSINOPHIL # BLD AUTO: 0.88 K/UL — HIGH (ref 0–0.5)
EOSINOPHIL NFR BLD AUTO: 12.3 % — HIGH (ref 0–6)
FLUAV AG NPH QL: SIGNIFICANT CHANGE UP
FLUBV AG NPH QL: SIGNIFICANT CHANGE UP
GLUCOSE SERPL-MCNC: 149 MG/DL — HIGH (ref 70–99)
HCT VFR BLD CALC: 37.4 % — SIGNIFICANT CHANGE UP (ref 34.5–45)
HGB BLD-MCNC: 11.4 G/DL — LOW (ref 11.5–15.5)
IMM GRANULOCYTES NFR BLD AUTO: 0.1 % — SIGNIFICANT CHANGE UP (ref 0–0.9)
LACTATE SERPL-SCNC: 0.9 MMOL/L — SIGNIFICANT CHANGE UP (ref 0.7–2)
LYMPHOCYTES # BLD AUTO: 1.28 K/UL — SIGNIFICANT CHANGE UP (ref 1–3.3)
LYMPHOCYTES # BLD AUTO: 18 % — SIGNIFICANT CHANGE UP (ref 13–44)
MCHC RBC-ENTMCNC: 30.5 GM/DL — LOW (ref 32–36)
MCHC RBC-ENTMCNC: 31.4 PG — SIGNIFICANT CHANGE UP (ref 27–34)
MCV RBC AUTO: 103 FL — HIGH (ref 80–100)
MONOCYTES # BLD AUTO: 0.63 K/UL — SIGNIFICANT CHANGE UP (ref 0–0.9)
MONOCYTES NFR BLD AUTO: 8.8 % — SIGNIFICANT CHANGE UP (ref 2–14)
NEUTROPHILS # BLD AUTO: 4.29 K/UL — SIGNIFICANT CHANGE UP (ref 1.8–7.4)
NEUTROPHILS NFR BLD AUTO: 60.2 % — SIGNIFICANT CHANGE UP (ref 43–77)
NRBC # BLD: 0 /100 WBCS — SIGNIFICANT CHANGE UP (ref 0–0)
NT-PROBNP SERPL-SCNC: 982 PG/ML — HIGH (ref 0–450)
PLATELET # BLD AUTO: 250 K/UL — SIGNIFICANT CHANGE UP (ref 150–400)
POTASSIUM SERPL-MCNC: 4.3 MMOL/L — SIGNIFICANT CHANGE UP (ref 3.5–5.3)
POTASSIUM SERPL-SCNC: 4.3 MMOL/L — SIGNIFICANT CHANGE UP (ref 3.5–5.3)
PROT SERPL-MCNC: 6.6 G/DL — SIGNIFICANT CHANGE UP (ref 6–8.3)
RBC # BLD: 3.63 M/UL — LOW (ref 3.8–5.2)
RBC # FLD: 14.5 % — SIGNIFICANT CHANGE UP (ref 10.3–14.5)
SARS-COV-2 RNA SPEC QL NAA+PROBE: SIGNIFICANT CHANGE UP
SODIUM SERPL-SCNC: 140 MMOL/L — SIGNIFICANT CHANGE UP (ref 135–145)
TROPONIN I, HIGH SENSITIVITY RESULT: 62.8 NG/L — HIGH
TROPONIN I, HIGH SENSITIVITY RESULT: 64.4 NG/L — HIGH
WBC # BLD: 7.13 K/UL — SIGNIFICANT CHANGE UP (ref 3.8–10.5)
WBC # FLD AUTO: 7.13 K/UL — SIGNIFICANT CHANGE UP (ref 3.8–10.5)

## 2023-06-03 PROCEDURE — 71045 X-RAY EXAM CHEST 1 VIEW: CPT | Mod: 26

## 2023-06-03 PROCEDURE — 99285 EMERGENCY DEPT VISIT HI MDM: CPT

## 2023-06-03 RX ORDER — APIXABAN 2.5 MG/1
5 TABLET, FILM COATED ORAL
Refills: 0 | Status: DISCONTINUED | OUTPATIENT
Start: 2023-06-03 | End: 2023-06-06

## 2023-06-03 RX ORDER — ALBUTEROL 90 UG/1
1 AEROSOL, METERED ORAL EVERY 6 HOURS
Refills: 0 | Status: DISCONTINUED | OUTPATIENT
Start: 2023-06-03 | End: 2023-06-06

## 2023-06-03 RX ORDER — AZITHROMYCIN 500 MG/1
500 TABLET, FILM COATED ORAL ONCE
Refills: 0 | Status: COMPLETED | OUTPATIENT
Start: 2023-06-03 | End: 2023-06-03

## 2023-06-03 RX ORDER — BUDESONIDE AND FORMOTEROL FUMARATE DIHYDRATE 160; 4.5 UG/1; UG/1
2 AEROSOL RESPIRATORY (INHALATION)
Refills: 0 | Status: DISCONTINUED | OUTPATIENT
Start: 2023-06-03 | End: 2023-06-06

## 2023-06-03 RX ORDER — MONTELUKAST 4 MG/1
10 TABLET, CHEWABLE ORAL DAILY
Refills: 0 | Status: DISCONTINUED | OUTPATIENT
Start: 2023-06-03 | End: 2023-06-06

## 2023-06-03 RX ORDER — SODIUM CHLORIDE 9 MG/ML
1000 INJECTION INTRAMUSCULAR; INTRAVENOUS; SUBCUTANEOUS
Refills: 0 | Status: DISCONTINUED | OUTPATIENT
Start: 2023-06-03 | End: 2023-06-03

## 2023-06-03 RX ORDER — CEFTRIAXONE 500 MG/1
1000 INJECTION, POWDER, FOR SOLUTION INTRAMUSCULAR; INTRAVENOUS ONCE
Refills: 0 | Status: COMPLETED | OUTPATIENT
Start: 2023-06-03 | End: 2023-06-03

## 2023-06-03 RX ORDER — SIMVASTATIN 20 MG/1
20 TABLET, FILM COATED ORAL AT BEDTIME
Refills: 0 | Status: DISCONTINUED | OUTPATIENT
Start: 2023-06-03 | End: 2023-06-06

## 2023-06-03 RX ORDER — ANASTROZOLE 1 MG/1
1 TABLET ORAL DAILY
Refills: 0 | Status: DISCONTINUED | OUTPATIENT
Start: 2023-06-03 | End: 2023-06-06

## 2023-06-03 RX ORDER — FUROSEMIDE 40 MG
40 TABLET ORAL DAILY
Refills: 0 | Status: DISCONTINUED | OUTPATIENT
Start: 2023-06-03 | End: 2023-06-05

## 2023-06-03 RX ORDER — ASPIRIN/CALCIUM CARB/MAGNESIUM 324 MG
162 TABLET ORAL ONCE
Refills: 0 | Status: COMPLETED | OUTPATIENT
Start: 2023-06-03 | End: 2023-06-03

## 2023-06-03 RX ORDER — CARVEDILOL PHOSPHATE 80 MG/1
6.25 CAPSULE, EXTENDED RELEASE ORAL EVERY 12 HOURS
Refills: 0 | Status: DISCONTINUED | OUTPATIENT
Start: 2023-06-03 | End: 2023-06-06

## 2023-06-03 RX ADMIN — MONTELUKAST 10 MILLIGRAM(S): 4 TABLET, CHEWABLE ORAL at 23:22

## 2023-06-03 RX ADMIN — SODIUM CHLORIDE 125 MILLILITER(S): 9 INJECTION INTRAMUSCULAR; INTRAVENOUS; SUBCUTANEOUS at 15:26

## 2023-06-03 RX ADMIN — SIMVASTATIN 20 MILLIGRAM(S): 20 TABLET, FILM COATED ORAL at 23:22

## 2023-06-03 RX ADMIN — CEFTRIAXONE 100 MILLIGRAM(S): 500 INJECTION, POWDER, FOR SOLUTION INTRAMUSCULAR; INTRAVENOUS at 15:27

## 2023-06-03 RX ADMIN — ANASTROZOLE 1 MILLIGRAM(S): 1 TABLET ORAL at 23:22

## 2023-06-03 RX ADMIN — Medication 162 MILLIGRAM(S): at 16:43

## 2023-06-03 RX ADMIN — AZITHROMYCIN 255 MILLIGRAM(S): 500 TABLET, FILM COATED ORAL at 15:46

## 2023-06-03 RX ADMIN — BUDESONIDE AND FORMOTEROL FUMARATE DIHYDRATE 2 PUFF(S): 160; 4.5 AEROSOL RESPIRATORY (INHALATION) at 23:22

## 2023-06-03 NOTE — ED PROVIDER NOTE - CLINICAL SUMMARY MEDICAL DECISION MAKING FREE TEXT BOX
pt with h/o COPD on home O2, CHF, now c/o sob, inc. fatigue.  concern for PNA, pl. eff., CHF exac., will get labs., CXR, Noted pt with LLE edema which is chronic,

## 2023-06-03 NOTE — H&P ADULT - NSTOBACCOSCREENHP_GEN_A_CS
No
Principal Discharge DX:	Vaginal bleeding in pregnancy, third trimester  Goal:	Recovery/ continue prenatal course  Assessment and plan of treatment:	- Follow up with OB within one week  - Return with vaginal bleeding, leaking fluid, contractions or decreased fetal movement  - Monitor for fetal kick counts, if decreased return to hospital

## 2023-06-03 NOTE — CONSULT NOTE ADULT - ASSESSMENT
89 yo F, from home, with PMHx HTN, COPD, Afib, HFpEF (7/29/22 EF 55-60%, LVH, GIDD, Breast CA, and remote h/o LLE DVT p/w SOB and hypoxia. Patient is on oxygen Nasal canula at home (unsure how many liters) but has been requiring more than her baseline over the last few days. Patient denies any coughing or chest pain. Admitted for CHF exacerbation.     Neuro:   # AAO x 3   no active issues    Cardiovascular:  # CHF  Chest xray indicative of chf exacerbation   40mg IV lasix daily  fluid restrictions  echo  cardio consulted Dr. Sherron campuzano.  I and o  monitor UOP    # Atrial fibrillation.   Pt with PMH of AFib   c/w eliquis      Pulmonary:   #Acute on chronic respiratory failure with hypoxia.   Chest xray indicative of chf exacerbation   40mg IV lasix daily  fluid restrictions  echo  cardio consulted Dr. Sherron campuzano.    # Chronic obstructive pulmonary disease (COPD).   pt with PMH of COPD with home oxygen and BIPAP at night   not wheezy , not in exacerbation    c/w albuterol and symbicort  consider duoneb inhalations if needed.  BIPAP at night         Infectious Diseases:  # No active issues    Gastrointestinal:  # No active issues    Renal:  # No active issues    Heme/onc:   # No active issues    Skin/ catheter:   - Peripheral lines    Prophylaxis:  # DVT > eliquis    Goals of Care:   - Full code     Dispo:   - Remains on the floor for now , to reconsult if needed   89 yo F, from home, with PMHx HTN, COPD, Afib, HFpEF (7/29/22 EF 55-60%, LVH, GIDD, Breast CA, and remote h/o LLE DVT p/w SOB and hypoxia. Patient is on oxygen Nasal canula at home (unsure how many liters) but has been requiring more than her baseline over the last few days. Patient denies any coughing or chest pain. Admitted for CHF exacerbation.     Neuro:   # AAO x 3   no active issues    Cardiovascular:  # CHF  Chest xray indicative of chf exacerbation   40mg IV lasix daily  fluid restrictions  echo  cardio consulted Dr. Sherron campuzano.  I and o  monitor UOP    # tropenemia   - trended down     # Atrial fibrillation.   Pt with PMH of AFib   c/w eliquis and carvidelol    # HTN  -CW home meds with parameters        Pulmonary:   #Acute on chronic respiratory failure with hypoxia.   Chest xray indicative of chf exacerbation   40mg IV lasix daily  fluid restrictions  echo  cardio consulted Dr. Sherron campuzano.    # Chronic obstructive pulmonary disease (COPD).   pt with PMH of COPD with home oxygen and BIPAP at night   not wheezy , not in exacerbation    c/w albuterol and symbicort  consider duoneb inhalations if needed.  BIPAP at night         Infectious Diseases:  # No active issues    Gastrointestinal:  # No active issues    Renal:  # No active issues    Heme/onc:   # Breast cancer   - CW anastrozole    Skin/ catheter:   - Peripheral lines    Prophylaxis:  # DVT > eliquis    Goals of Care:   - Full code     Dispo:   - Remains on the floor for now , to reconsult if needed

## 2023-06-03 NOTE — H&P ADULT - HISTORY OF PRESENT ILLNESS
89 yo F, from home, with PMHx HTN, COPD, Afib, HFpEF (7/29/22 EF 55-60%, LVH, GIDD, Breast CA, and remote h/o LLE DVT p/w SOB and hypoxia. Patient is on oxygen Nasal canula at home (unsure how many liters) but has been requiring more than her baseline over the last few days. Patient denies any coughing or chest pain. Patient was discharged from [facility] in february 2023  89 yo F, from home, with PMHx HTN, COPD, Afib, HFpEF (7/29/22 EF 55-60%, LVH, GIDD, Breast CA, and remote h/o LLE DVT p/w SOB and hypoxia. Patient is on oxygen Nasal canula at home (unsure how many liters) but has been requiring more than her baseline over the last few days. Patient denies any coughing or chest pain. Patient was discharged from [facility] in february 2023 for AHRF secondary to COPD exacerbation. Patient denies any fevers, chills, headaches, dizziness, chest pain, cough, abd pain, nv, diarrhea, constipation, hematuria.    In ED VS:   T 98.2, HR 85, /81, RR 18, Spo2 97% 3LNC   trop 64   EKG A fib   cxray heart enlargement and central CHF

## 2023-06-03 NOTE — H&P ADULT - ASSESSMENT
89 yo F, from home, with PMHx HTN, COPD, Afib, HFpEF (7/29/22 EF 55-60%, LVH, GIDD, Breast CA, and remote h/o LLE DVT p/w SOB and hypoxia admitted for CHF exacerbation

## 2023-06-03 NOTE — ED PROVIDER NOTE - OBJECTIVE STATEMENT
89-year-old female with history of COPD on home O2 2 L, A-fib on Eliquis, complaining of SOB when sleeping for the past 2 days, increased fatigue, mild coughing.  Patient denies fever, wheezing, chest pain.  Patient normally walks with a walker.  Daughter noted his O2 sat was 78 to 80% when off O2

## 2023-06-03 NOTE — H&P ADULT - PROBLEM SELECTOR PLAN 1
Chest xray indicative of chf exacerbation   40mg IV lasix daily  fluid restrictions  echo  cardio consulted Dr. Sherron campuzano

## 2023-06-03 NOTE — CONSULT NOTE ADULT - ATTENDING COMMENTS
Case discussed with resident and agree with note. No indication for ICU admission at this time. Reconsult as needed.

## 2023-06-03 NOTE — PATIENT PROFILE ADULT - FALL HARM RISK - HARM RISK INTERVENTIONS
Assistance with ambulation/Assistance OOB with selected safe patient handling equipment/Communicate Risk of Fall with Harm to all staff/Discuss with provider need for PT consult/Monitor gait and stability/Reinforce activity limits and safety measures with patient and family/Tailored Fall Risk Interventions/Visual Cue: Yellow wristband and red socks/Bed in lowest position, wheels locked, appropriate side rails in place/Call bell, personal items and telephone in reach/Instruct patient to call for assistance before getting out of bed or chair/Non-slip footwear when patient is out of bed/[place] to call system/Physically safe environment - no spills, clutter or unnecessary equipment/Purposeful Proactive Rounding/Room/bathroom lighting operational, light cord in reach

## 2023-06-03 NOTE — ED ADULT NURSE NOTE - NSFALLHARMRISKINTERV_ED_ALL_ED
Assistance OOB with selected safe patient handling equipment if applicable/Assistance with ambulation/Communicate risk of Fall with Harm to all staff, patient, and family/Monitor gait and stability/Provide visual cue: red socks, yellow wristband, yellow gown, etc/Reinforce activity limits and safety measures with patient and family/Bed in lowest position, wheels locked, appropriate side rails in place/Call bell, personal items and telephone in reach/Instruct patient to call for assistance before getting out of bed/chair/stretcher/Non-slip footwear applied when patient is off stretcher/[place] to call system/Physically safe environment - no spills, clutter or unnecessary equipment/Purposeful Proactive Rounding/Room/bathroom lighting operational, light cord in reach

## 2023-06-03 NOTE — ED PROVIDER NOTE - PROGRESS NOTE DETAILS
Labs/CXR explained to pt & daughter  Pt with elevated Trop with no JACOBO, will admit pt.  Case d/w Dr. Bianchi

## 2023-06-04 LAB
ALBUMIN SERPL ELPH-MCNC: 3.2 G/DL — LOW (ref 3.5–5)
ALP SERPL-CCNC: 114 U/L — SIGNIFICANT CHANGE UP (ref 40–120)
ALT FLD-CCNC: 19 U/L DA — SIGNIFICANT CHANGE UP (ref 10–60)
ANION GAP SERPL CALC-SCNC: 2 MMOL/L — LOW (ref 5–17)
AST SERPL-CCNC: 11 U/L — SIGNIFICANT CHANGE UP (ref 10–40)
BILIRUB SERPL-MCNC: 0.8 MG/DL — SIGNIFICANT CHANGE UP (ref 0.2–1.2)
BUN SERPL-MCNC: 7 MG/DL — SIGNIFICANT CHANGE UP (ref 7–18)
CALCIUM SERPL-MCNC: 9 MG/DL — SIGNIFICANT CHANGE UP (ref 8.4–10.5)
CHLORIDE SERPL-SCNC: 102 MMOL/L — SIGNIFICANT CHANGE UP (ref 96–108)
CO2 SERPL-SCNC: 34 MMOL/L — HIGH (ref 22–31)
CREAT SERPL-MCNC: 0.65 MG/DL — SIGNIFICANT CHANGE UP (ref 0.5–1.3)
EGFR: 83 ML/MIN/1.73M2 — SIGNIFICANT CHANGE UP
GLUCOSE SERPL-MCNC: 128 MG/DL — HIGH (ref 70–99)
HCT VFR BLD CALC: 41.4 % — SIGNIFICANT CHANGE UP (ref 34.5–45)
HGB BLD-MCNC: 12.6 G/DL — SIGNIFICANT CHANGE UP (ref 11.5–15.5)
MAGNESIUM SERPL-MCNC: 2 MG/DL — SIGNIFICANT CHANGE UP (ref 1.6–2.6)
MCHC RBC-ENTMCNC: 30.4 GM/DL — LOW (ref 32–36)
MCHC RBC-ENTMCNC: 31.6 PG — SIGNIFICANT CHANGE UP (ref 27–34)
MCV RBC AUTO: 103.8 FL — HIGH (ref 80–100)
NRBC # BLD: 0 /100 WBCS — SIGNIFICANT CHANGE UP (ref 0–0)
PHOSPHATE SERPL-MCNC: 3.7 MG/DL — SIGNIFICANT CHANGE UP (ref 2.5–4.5)
PLATELET # BLD AUTO: 253 K/UL — SIGNIFICANT CHANGE UP (ref 150–400)
POTASSIUM SERPL-MCNC: 4.3 MMOL/L — SIGNIFICANT CHANGE UP (ref 3.5–5.3)
POTASSIUM SERPL-SCNC: 4.3 MMOL/L — SIGNIFICANT CHANGE UP (ref 3.5–5.3)
PROT SERPL-MCNC: 7.1 G/DL — SIGNIFICANT CHANGE UP (ref 6–8.3)
RBC # BLD: 3.99 M/UL — SIGNIFICANT CHANGE UP (ref 3.8–5.2)
RBC # FLD: 14 % — SIGNIFICANT CHANGE UP (ref 10.3–14.5)
SODIUM SERPL-SCNC: 138 MMOL/L — SIGNIFICANT CHANGE UP (ref 135–145)
WBC # BLD: 6.93 K/UL — SIGNIFICANT CHANGE UP (ref 3.8–10.5)
WBC # FLD AUTO: 6.93 K/UL — SIGNIFICANT CHANGE UP (ref 3.8–10.5)

## 2023-06-04 RX ORDER — ACETAMINOPHEN 500 MG
650 TABLET ORAL EVERY 6 HOURS
Refills: 0 | Status: DISCONTINUED | OUTPATIENT
Start: 2023-06-04 | End: 2023-06-06

## 2023-06-04 RX ADMIN — Medication 650 MILLIGRAM(S): at 16:25

## 2023-06-04 RX ADMIN — Medication 650 MILLIGRAM(S): at 17:15

## 2023-06-04 RX ADMIN — BUDESONIDE AND FORMOTEROL FUMARATE DIHYDRATE 2 PUFF(S): 160; 4.5 AEROSOL RESPIRATORY (INHALATION) at 22:06

## 2023-06-04 RX ADMIN — CARVEDILOL PHOSPHATE 6.25 MILLIGRAM(S): 80 CAPSULE, EXTENDED RELEASE ORAL at 17:37

## 2023-06-04 RX ADMIN — ANASTROZOLE 1 MILLIGRAM(S): 1 TABLET ORAL at 12:25

## 2023-06-04 RX ADMIN — APIXABAN 5 MILLIGRAM(S): 2.5 TABLET, FILM COATED ORAL at 05:25

## 2023-06-04 RX ADMIN — MONTELUKAST 10 MILLIGRAM(S): 4 TABLET, CHEWABLE ORAL at 12:25

## 2023-06-04 RX ADMIN — CARVEDILOL PHOSPHATE 6.25 MILLIGRAM(S): 80 CAPSULE, EXTENDED RELEASE ORAL at 05:24

## 2023-06-04 RX ADMIN — APIXABAN 5 MILLIGRAM(S): 2.5 TABLET, FILM COATED ORAL at 17:37

## 2023-06-04 RX ADMIN — SIMVASTATIN 20 MILLIGRAM(S): 20 TABLET, FILM COATED ORAL at 22:06

## 2023-06-04 RX ADMIN — BUDESONIDE AND FORMOTEROL FUMARATE DIHYDRATE 2 PUFF(S): 160; 4.5 AEROSOL RESPIRATORY (INHALATION) at 12:25

## 2023-06-04 RX ADMIN — Medication 40 MILLIGRAM(S): at 05:25

## 2023-06-04 NOTE — CONSULT NOTE ADULT - ASSESSMENT
89 yo F, from home, with PMHx HTN, COPD, Afib, HFpEF (7/29/22 EF 55-60%, LVH, GIDD, Breast CA, and remote h/o LLE DVT p/w SOB and hypoxia,acute hypoxic respiratory failure due to acute diastolic HF.  1.Tele monitoring.  2.Echocardiogram.  3.Acute diastolic HF-dec lasix 20mg iv qd (bicarb is 34).  4.Afib-eliquis,coreg.  5.Breast Ca-anastrazole.  6.PPI.  7.Check TSH.  8.Sleep study as outpatient-R/O MARGARITA.  9.Above d/w pt's daughter at bedside.

## 2023-06-04 NOTE — PROGRESS NOTE ADULT - ASSESSMENT
89 yo F, from home, with PMHx HTN, COPD, Afib, HFpEF (7/29/22 EF 55-60%, LVH, GIDD, Breast CA, and remote h/o LLE DVT p/w SOB and hypoxia admitted for CHF exacerbation

## 2023-06-05 DIAGNOSIS — Z02.9 ENCOUNTER FOR ADMINISTRATIVE EXAMINATIONS, UNSPECIFIED: ICD-10-CM

## 2023-06-05 LAB
ALBUMIN SERPL ELPH-MCNC: 3.1 G/DL — LOW (ref 3.5–5)
ALP SERPL-CCNC: 108 U/L — SIGNIFICANT CHANGE UP (ref 40–120)
ALT FLD-CCNC: 17 U/L DA — SIGNIFICANT CHANGE UP (ref 10–60)
ANION GAP SERPL CALC-SCNC: 2 MMOL/L — LOW (ref 5–17)
AST SERPL-CCNC: 10 U/L — SIGNIFICANT CHANGE UP (ref 10–40)
BILIRUB SERPL-MCNC: 1 MG/DL — SIGNIFICANT CHANGE UP (ref 0.2–1.2)
BUN SERPL-MCNC: 12 MG/DL — SIGNIFICANT CHANGE UP (ref 7–18)
CALCIUM SERPL-MCNC: 8.6 MG/DL — SIGNIFICANT CHANGE UP (ref 8.4–10.5)
CHLORIDE SERPL-SCNC: 98 MMOL/L — SIGNIFICANT CHANGE UP (ref 96–108)
CO2 SERPL-SCNC: 39 MMOL/L — HIGH (ref 22–31)
CREAT SERPL-MCNC: 0.82 MG/DL — SIGNIFICANT CHANGE UP (ref 0.5–1.3)
EGFR: 67 ML/MIN/1.73M2 — SIGNIFICANT CHANGE UP
GLUCOSE SERPL-MCNC: 126 MG/DL — HIGH (ref 70–99)
HCT VFR BLD CALC: 40.1 % — SIGNIFICANT CHANGE UP (ref 34.5–45)
HGB BLD-MCNC: 12.2 G/DL — SIGNIFICANT CHANGE UP (ref 11.5–15.5)
MAGNESIUM SERPL-MCNC: 1.9 MG/DL — SIGNIFICANT CHANGE UP (ref 1.6–2.6)
MCHC RBC-ENTMCNC: 30.4 GM/DL — LOW (ref 32–36)
MCHC RBC-ENTMCNC: 31.3 PG — SIGNIFICANT CHANGE UP (ref 27–34)
MCV RBC AUTO: 102.8 FL — HIGH (ref 80–100)
NRBC # BLD: 0 /100 WBCS — SIGNIFICANT CHANGE UP (ref 0–0)
PHOSPHATE SERPL-MCNC: 3.1 MG/DL — SIGNIFICANT CHANGE UP (ref 2.5–4.5)
PLATELET # BLD AUTO: 266 K/UL — SIGNIFICANT CHANGE UP (ref 150–400)
POTASSIUM SERPL-MCNC: 4 MMOL/L — SIGNIFICANT CHANGE UP (ref 3.5–5.3)
POTASSIUM SERPL-SCNC: 4 MMOL/L — SIGNIFICANT CHANGE UP (ref 3.5–5.3)
PROT SERPL-MCNC: 6.9 G/DL — SIGNIFICANT CHANGE UP (ref 6–8.3)
RBC # BLD: 3.9 M/UL — SIGNIFICANT CHANGE UP (ref 3.8–5.2)
RBC # FLD: 14.1 % — SIGNIFICANT CHANGE UP (ref 10.3–14.5)
SODIUM SERPL-SCNC: 139 MMOL/L — SIGNIFICANT CHANGE UP (ref 135–145)
TSH SERPL-MCNC: 1.52 UU/ML — SIGNIFICANT CHANGE UP (ref 0.34–4.82)
VIT B12 SERPL-MCNC: 849 PG/ML — SIGNIFICANT CHANGE UP (ref 232–1245)
WBC # BLD: 6.85 K/UL — SIGNIFICANT CHANGE UP (ref 3.8–10.5)
WBC # FLD AUTO: 6.85 K/UL — SIGNIFICANT CHANGE UP (ref 3.8–10.5)

## 2023-06-05 RX ORDER — FUROSEMIDE 40 MG
20 TABLET ORAL DAILY
Refills: 0 | Status: DISCONTINUED | OUTPATIENT
Start: 2023-06-06 | End: 2023-06-06

## 2023-06-05 RX ORDER — ACETAZOLAMIDE 250 MG/1
250 TABLET ORAL
Refills: 0 | Status: COMPLETED | OUTPATIENT
Start: 2023-06-05 | End: 2023-06-06

## 2023-06-05 RX ORDER — FUROSEMIDE 40 MG
20 TABLET ORAL DAILY
Refills: 0 | Status: DISCONTINUED | OUTPATIENT
Start: 2023-06-05 | End: 2023-06-05

## 2023-06-05 RX ADMIN — CARVEDILOL PHOSPHATE 6.25 MILLIGRAM(S): 80 CAPSULE, EXTENDED RELEASE ORAL at 05:46

## 2023-06-05 RX ADMIN — MONTELUKAST 10 MILLIGRAM(S): 4 TABLET, CHEWABLE ORAL at 14:37

## 2023-06-05 RX ADMIN — ANASTROZOLE 1 MILLIGRAM(S): 1 TABLET ORAL at 11:29

## 2023-06-05 RX ADMIN — ACETAZOLAMIDE 250 MILLIGRAM(S): 250 TABLET ORAL at 21:53

## 2023-06-05 RX ADMIN — SIMVASTATIN 20 MILLIGRAM(S): 20 TABLET, FILM COATED ORAL at 21:53

## 2023-06-05 RX ADMIN — Medication 650 MILLIGRAM(S): at 11:47

## 2023-06-05 RX ADMIN — Medication 650 MILLIGRAM(S): at 12:42

## 2023-06-05 RX ADMIN — CARVEDILOL PHOSPHATE 6.25 MILLIGRAM(S): 80 CAPSULE, EXTENDED RELEASE ORAL at 17:59

## 2023-06-05 RX ADMIN — Medication 40 MILLIGRAM(S): at 05:45

## 2023-06-05 RX ADMIN — APIXABAN 5 MILLIGRAM(S): 2.5 TABLET, FILM COATED ORAL at 05:45

## 2023-06-05 RX ADMIN — BUDESONIDE AND FORMOTEROL FUMARATE DIHYDRATE 2 PUFF(S): 160; 4.5 AEROSOL RESPIRATORY (INHALATION) at 11:30

## 2023-06-05 RX ADMIN — APIXABAN 5 MILLIGRAM(S): 2.5 TABLET, FILM COATED ORAL at 17:59

## 2023-06-05 RX ADMIN — ACETAZOLAMIDE 250 MILLIGRAM(S): 250 TABLET ORAL at 11:30

## 2023-06-05 RX ADMIN — BUDESONIDE AND FORMOTEROL FUMARATE DIHYDRATE 2 PUFF(S): 160; 4.5 AEROSOL RESPIRATORY (INHALATION) at 21:53

## 2023-06-05 NOTE — PROGRESS NOTE ADULT - ASSESSMENT
89 yo F, from home, with PMHx HTN, COPD, Afib, HFpEF (7/29/22 EF 55-60%, LVH, GIDD, Breast CA, and remote h/o LLE DVT p/w SOB and hypoxia,acute hypoxic respiratory failure due to acute diastolic HF.  1.Tele monitoring.  2.Echocardiogram.  3.Acute diastolic HF- lasix 20mg iv qd,demadex 250mg po x2 doses.  4.Afib-eliquis,coreg.  5.Breast Ca-anastrazole.  6.PPI.  7.Sleep study as outpatient-R/O MARGARITA.

## 2023-06-05 NOTE — PROGRESS NOTE ADULT - PROBLEM SELECTOR PLAN 1
p/w SOB and hypoxia  - CXR indicative of chf exacerbation   - lasix decreased to 200mg IV lasix daily  - continue tele  - diamox x 2 doses  - continue oxygen, wean as tolerated  - pending echo  - Dr Siu cards following

## 2023-06-05 NOTE — PROGRESS NOTE ADULT - ASSESSMENT
89 yo F, from home, with PMHx HTN, COPD, Afib, HFpEF (7/29/22 EF 55-60%), LVH, GIDD, Breast CA, and remote h/o LLE DVT p/w SOB and hypoxia. Patient is on oxygen Nasal canula at home (unsure how many liters) but has been requiring more than her baseline over the last few days. Patient denies any coughing or chest pain. Patient was discharged from [facility] in february 2023 for AHRF secondary to COPD exacerbation. Pt is admitted to [facility] for CHF exacerbation. CXR showed heart enlargement and central CHF. Cardiology Dr Siu is following  Pending echo     89 yo F, from home, with PMHx HTN, COPD, Afib, HFpEF (7/29/22 EF 55-60%), LVH, GIDD, Breast CA, and remote h/o LLE DVT p/w SOB and hypoxia. Patient was discharged from [facility] in february 2023 for AHRF secondary to COPD exacerbation. Pt is admitted to [facility] for CHF exacerbation. CXR showed heart enlargement and central CHF.

## 2023-06-06 ENCOUNTER — TRANSCRIPTION ENCOUNTER (OUTPATIENT)
Age: 88
End: 2023-06-06

## 2023-06-06 VITALS
HEART RATE: 64 BPM | OXYGEN SATURATION: 96 % | SYSTOLIC BLOOD PRESSURE: 125 MMHG | RESPIRATION RATE: 18 BRPM | TEMPERATURE: 98 F | DIASTOLIC BLOOD PRESSURE: 63 MMHG

## 2023-06-06 PROCEDURE — 87637 SARSCOV2&INF A&B&RSV AMP PRB: CPT

## 2023-06-06 PROCEDURE — 94640 AIRWAY INHALATION TREATMENT: CPT

## 2023-06-06 PROCEDURE — 84484 ASSAY OF TROPONIN QUANT: CPT

## 2023-06-06 PROCEDURE — 85027 COMPLETE CBC AUTOMATED: CPT

## 2023-06-06 PROCEDURE — 93005 ELECTROCARDIOGRAM TRACING: CPT

## 2023-06-06 PROCEDURE — 96374 THER/PROPH/DIAG INJ IV PUSH: CPT

## 2023-06-06 PROCEDURE — 83880 ASSAY OF NATRIURETIC PEPTIDE: CPT

## 2023-06-06 PROCEDURE — 85025 COMPLETE CBC W/AUTO DIFF WBC: CPT

## 2023-06-06 PROCEDURE — 87040 BLOOD CULTURE FOR BACTERIA: CPT

## 2023-06-06 PROCEDURE — 83605 ASSAY OF LACTIC ACID: CPT

## 2023-06-06 PROCEDURE — 80053 COMPREHEN METABOLIC PANEL: CPT

## 2023-06-06 PROCEDURE — 84443 ASSAY THYROID STIM HORMONE: CPT

## 2023-06-06 PROCEDURE — 83735 ASSAY OF MAGNESIUM: CPT

## 2023-06-06 PROCEDURE — 82962 GLUCOSE BLOOD TEST: CPT

## 2023-06-06 PROCEDURE — 84100 ASSAY OF PHOSPHORUS: CPT

## 2023-06-06 PROCEDURE — 82607 VITAMIN B-12: CPT

## 2023-06-06 PROCEDURE — 99285 EMERGENCY DEPT VISIT HI MDM: CPT | Mod: 25

## 2023-06-06 PROCEDURE — 71045 X-RAY EXAM CHEST 1 VIEW: CPT

## 2023-06-06 PROCEDURE — 36415 COLL VENOUS BLD VENIPUNCTURE: CPT

## 2023-06-06 PROCEDURE — 93306 TTE W/DOPPLER COMPLETE: CPT

## 2023-06-06 PROCEDURE — 96375 TX/PRO/DX INJ NEW DRUG ADDON: CPT

## 2023-06-06 RX ORDER — FUROSEMIDE 40 MG
1 TABLET ORAL
Qty: 30 | Refills: 0
Start: 2023-06-06 | End: 2023-07-05

## 2023-06-06 RX ORDER — FUROSEMIDE 40 MG
1 TABLET ORAL
Refills: 0 | DISCHARGE

## 2023-06-06 RX ORDER — ALBUTEROL 90 UG/1
0 AEROSOL, METERED ORAL
Qty: 0 | Refills: 0 | DISCHARGE

## 2023-06-06 RX ORDER — FLUTICASONE FUROATE, UMECLIDINIUM BROMIDE AND VILANTEROL TRIFENATATE 200; 62.5; 25 UG/1; UG/1; UG/1
1 POWDER RESPIRATORY (INHALATION)
Qty: 0 | Refills: 0 | DISCHARGE

## 2023-06-06 RX ORDER — FUROSEMIDE 40 MG
40 TABLET ORAL DAILY
Refills: 0 | Status: DISCONTINUED | OUTPATIENT
Start: 2023-06-06 | End: 2023-06-06

## 2023-06-06 RX ADMIN — BUDESONIDE AND FORMOTEROL FUMARATE DIHYDRATE 2 PUFF(S): 160; 4.5 AEROSOL RESPIRATORY (INHALATION) at 10:28

## 2023-06-06 RX ADMIN — ANASTROZOLE 1 MILLIGRAM(S): 1 TABLET ORAL at 12:05

## 2023-06-06 RX ADMIN — APIXABAN 5 MILLIGRAM(S): 2.5 TABLET, FILM COATED ORAL at 05:35

## 2023-06-06 RX ADMIN — CARVEDILOL PHOSPHATE 6.25 MILLIGRAM(S): 80 CAPSULE, EXTENDED RELEASE ORAL at 05:35

## 2023-06-06 RX ADMIN — ACETAZOLAMIDE 250 MILLIGRAM(S): 250 TABLET ORAL at 05:35

## 2023-06-06 RX ADMIN — MONTELUKAST 10 MILLIGRAM(S): 4 TABLET, CHEWABLE ORAL at 12:05

## 2023-06-06 RX ADMIN — Medication 20 MILLIGRAM(S): at 05:36

## 2023-06-06 NOTE — DISCHARGE NOTE PROVIDER - CARE PROVIDER_API CALL
Kristine Siu  Cardiology  [address] [place] [address]  Phone: [phone]  Fax: [phone]  Follow Up Time: 1 week    Rito Son  Pulmonary Disease  [address] [place] [address]  Phone: [phone]  Fax: [phone]  Follow Up Time: 1 week

## 2023-06-06 NOTE — DISCHARGE NOTE NURSING/CASE MANAGEMENT/SOCIAL WORK - NSDCVIVACCINE_GEN_ALL_CORE_FT
influenza, injectable, quadrivalent, preservative free; 27-Oct-2017 14:11; Brandy Joaquin (RN); [employer]; [id number]; IntraMuscular; Deltoid Left.; 0.5 milliLiter(s); VIS (VIS Published: 07-Aug-2015, VIS Presented: 27-Oct-2017);   influenza, high-dose, quadrivalent; 18-Nov-2021 06:24; Yemi Santana (WINTER); [employer]; [id number] (Exp. Date: 30-Jun-2022); IntraMuscular; Deltoid Left.; 0.7 milliLiter(s); VIS (VIS Published: 06-Aug-2021, VIS Presented: 18-Nov-2021);

## 2023-06-06 NOTE — DISCHARGE NOTE NURSING/CASE MANAGEMENT/SOCIAL WORK - PATIENT PORTAL LINK FT
You can access the FollowMyHealth Patient Portal offered by [facility] by registering at the following website: [email or web address]. By joining [facility]’s FollowMyHealth portal, you will also be able to view your health information using other applications (apps) compatible with our system.

## 2023-06-06 NOTE — PROGRESS NOTE ADULT - PROBLEM SELECTOR PLAN 3
p/w SOB and hypoxia  - not on exacerbation  - continue with albuterol and symbicort  - continue oxygen, wean as tolerated
p/w SOB and hypoxia  - not on exacerbation  - continue with albuterol and symbicort  - continue oxygen, wean as tolerated  SLEEP STUDY AS OUT PATIENT
c/w albuterol and symbicort  consider duoneb inhalations if needed

## 2023-06-06 NOTE — PROGRESS NOTE ADULT - PROBLEM SELECTOR PLAN 1
p/w SOB and hypoxia  - CXR indicative of chf exacerbation   lasix 40 mg pood   - continue tele  - diamox x 2 doses  - continue oxygen, wean as tolerated  dc tele   - Dr Siu cards following

## 2023-06-06 NOTE — ACUTE INTERFACILITY TRANSFER NOTE - CLICK TO LAUNCH ORM
Care Transitions Outreach Attempt    Call within 2 business days of discharge: Yes   Attempted to reach patient for transitions of care follow up. Unable to reach patient. Patient: Jana Son Patient : 1950 MRN: [id number]    Last Discharge  Street       Date Complaint Diagnosis Description Type Department Provider    23 Fatigue; Shortness of Breath; Other Hypokalemia . .. ED to Hosp-Admission (Discharged) (ADMITTED) HOSSE[facility] Eldon Chester DO; Denece Child. .. Was this an external facility discharge?  No Discharge Facility:     Noted following upcoming appointments from discharge chart review:   [facility] follow up appointment(s):   Future Appointments   Date Time Provider Chitra Jackson   2023  8:45 AM Jennifer Mckeon MD \[facility] [place] [facility]\""[address] [facility]. [facility]   2023  8:00  [address] [place] [address]-[facility] follow up appointment(s):
.

## 2023-06-06 NOTE — PROGRESS NOTE ADULT - ASSESSMENT
89 yo F, from home, with PMHx HTN, COPD, Afib, HFpEF (7/29/22 EF 55-60%), LVH, GIDD, Breast CA, and remote h/o LLE DVT p/w SOB and hypoxia. Patient was discharged from [facility] in february 2023 for AHRF secondary to COPD exacerbation. Pt is admitted to [facility] for CHF exacerbation. CXR showed heart enlargement and central CHF.

## 2023-06-06 NOTE — DISCHARGE NOTE PROVIDER - HOSPITAL COURSE
89 y/o F, from home, with PMHx HTN, COPD, Afib, HFpEF (7/29/22 EF 55-60%, LVH, GIDD, Breast CA, and remote h/o LLE DVT p/w SOB and hypoxia. Patient is on oxygen Nasal canula at home (2L) but has been requiring more than her baseline over the last few days. Pt was admitted to medicine for acute hypoxic respiratory failure due to CHF exacerbation.   Pt was given IV lasix and now back to baseline.   echo shows HFpEF, grade 2 DD.     Pt is now medically optimized for discharge home, will arrange for home physical therapy.

## 2023-06-06 NOTE — PROGRESS NOTE ADULT - PROBLEM SELECTOR PROBLEM 2
Acute on chronic congestive heart failure

## 2023-06-06 NOTE — DISCHARGE NOTE PROVIDER - PROVIDER TOKENS
PROVIDER:[TOKEN:[[id number]],FOLLOWUP:[1 week]],PROVIDER:[TOKEN:[[id number]],FOLLOWUP:[1 week]]

## 2023-06-06 NOTE — PROGRESS NOTE ADULT - PROBLEM SELECTOR PLAN 5
- continue with home meds carvedilol
- continue with home meds carvedilol
c/w home meds with parameters

## 2023-06-06 NOTE — PROGRESS NOTE ADULT - ASSESSMENT
89 yo F, from home, with PMHx HTN, COPD, Afib, HFpEF (7/29/22 EF 55-60%, LVH, GIDD, Breast CA, and remote h/o LLE DVT p/w SOB and hypoxia,acute hypoxic respiratory failure due to acute diastolic HF.  1.D/C Tele monitoring.  2.Acute diastolic HF-change lasix 40mg qd.  3.Afib-eliquis,coreg.  4.Breast Ca-anastrazole.  5.PPI.  6.Sleep study as outpatient-R/O MARGARITA.

## 2023-06-06 NOTE — DISCHARGE NOTE PROVIDER - NSDCMRMEDTOKEN_GEN_ALL_CORE_FT
albuterol 90 mcg/inh inhalation aerosol: 1 puff(s) inhaled every 6 hours, As Needed  anastrozole 1 mg oral tablet: 1 tab(s) orally once a day  apixaban 5 mg oral tablet: 1 tab(s) orally every 12 hours  carvedilol 6.25 mg oral tablet: 1 tab(s) orally every 12 hours  furosemide 40 mg oral tablet: 1 tab(s) orally once a day  montelukast 10 mg oral tablet: 1 tab(s) orally once a day  simvastatin 20 mg oral tablet: 1 tab(s) orally once a day (at bedtime)  TRELEGY ELLIPTA  100-62.5-25 MCG/ACT AEPB:    14-Jun-2021 18:17

## 2023-06-06 NOTE — DISCHARGE NOTE PROVIDER - NSDCCPCAREPLAN_GEN_ALL_CORE_FT
PRINCIPAL DISCHARGE DIAGNOSIS  Diagnosis: Acute on chronic respiratory failure with hypoxia  Assessment and Plan of Treatment: you came into the hospital for shortness of breath   this was likely due to your history of COPD and your congestive heart failure  you are to continue taking lasix 40 mg daily and your inhalers.      SECONDARY DISCHARGE DIAGNOSES  Diagnosis: Acute on chronic diastolic congestive heart failure  Assessment and Plan of Treatment: you came into the hospital for shortness of breath   you had an xray of your chest that shows findings of congestive heart failure  you also had an echocardiogram that grade 2 diastolic dysfunction   you are to continue taking lasix 40 mg daily  follow up with cardiologist Dr. Siu in 1 week  Heart failure is a condition that does not allow your heart to fill properly. Your heart cannot pump enough oxygen in your blood to your organs and tissues. Fluid may not move through your body properly. Fluid may build up and cause swelling and trouble breathing. This is known as congestive heart failure. Heart failure may start in the left or right ventricle. Heart failure is a long-term condition that tends to get worse over time. It is important to manage your health to improve your quality of life.  Call your local emergency number (911 in the ) if:  You have any of the following signs of a heart attack:  Squeezing, pressure, or pain in your chest  You may also have any of the following:  Discomfort or pain in your back, neck, jaw, stomach, or arm  Shortness of breath  Nausea or vomiting  Lightheadedness or a sudden cold sweat  Limit sodium (salt)  Weigh yourself every morning.       Diagnosis: Chronic obstructive pulmonary disease (COPD)  Assessment and Plan of Treatment: continue using your ellipta inhaler daily  continue using albuterol inhaler 2 puffs every 6 hours as needed for worsening shortness of breath  continue using singulair 10 mg at bedtime  please continue using your breathing machine at night time  you will need a sleep study with pulmonologist Dr. Son, please schedule an appointment    Diagnosis: Atrial fibrillation  Assessment and Plan of Treatment: continue taking carvefilol 6.25 mg twice a day and eliquis 5 mg twice a day    Diagnosis: HTN (hypertension)  Assessment and Plan of Treatment: your blood pressure ranged between: (95/66 - 123/88)  continue taking carvedilol 6.25 mg twice a day and lasix 40 mg daily  follow up with your primary care doctor or cardiologist.  try to take your blood pressure readings twice a day, morning and night time.   Notify your doctor if you have any of the following symptoms:   Dizziness, Lightheadedness, Blurry vision, Headache, Chest pain, Shortness of breath       PRINCIPAL DISCHARGE DIAGNOSIS  Diagnosis: Acute on chronic respiratory failure with hypoxia  Assessment and Plan of Treatment: you came into the hospital for shortness of breath   this was likely due to your history of COPD and your congestive heart failure  you are to continue taking lasix 40 mg daily and your inhalers.  try not to stay in bed all day, as this does not help your lungs  try to sit in a chair for at elast a few hours a day  you will be arranged with home physical therapy      SECONDARY DISCHARGE DIAGNOSES  Diagnosis: Acute on chronic diastolic congestive heart failure  Assessment and Plan of Treatment: you came into the hospital for shortness of breath   you had an xray of your chest that shows findings of congestive heart failure  you also had an echocardiogram that grade 2 diastolic dysfunction   you are to continue taking lasix 40 mg daily  try to eat a banana daily to replenish potassium due to increased loss from lasix  follow up with cardiologist Dr. Siu in 1 week  Heart failure is a condition that does not allow your heart to fill properly. Your heart cannot pump enough oxygen in your blood to your organs and tissues. Fluid may not move through your body properly. Fluid may build up and cause swelling and trouble breathing. This is known as congestive heart failure. Heart failure may start in the left or right ventricle. Heart failure is a long-term condition that tends to get worse over time. It is important to manage your health to improve your quality of life.  Call your local emergency number (911 in the [place]) if:  You have any of the following signs of a heart attack:  Squeezing, pressure, or pain in your chest  You may also have any of the following:  Discomfort or pain in your back, neck, jaw, stomach, or arm  Shortness of breath  Nausea or vomiting  Lightheadedness or a sudden cold sweat  Limit sodium (salt)  Weigh yourself every morning.       Diagnosis: Chronic obstructive pulmonary disease (COPD)  Assessment and Plan of Treatment: continue using your ellipta inhaler daily  continue using albuterol inhaler 2 puffs every 6 hours as needed for worsening shortness of breath  continue using singulair 10 mg at bedtime  please continue using your breathing machine at night time  you will need a sleep study with pulmonologist Dr. Son, please schedule an appointment    Diagnosis: At risk for sleep apnea  Assessment and Plan of Treatment: you have possible obstructive sleep apnea,   you will need a sleep study with pulmonologist Dr. Son, please schedule an appointment  Sleep apnea is a condition that causes you to stop breathing often during sleep.  Obstructive sleep apnea (MARGARITA) is the most common kind. The muscles and tissues around your throat relax and block air from passing through. Obesity, use of alcohol or cigarettes, or a family history are common causes. MARGARITA may increase your risk for complications after surgery.  What you need to know about a sleep study: A sleep study is a test to learn if you have a sleep-related breathing disorder (SRBD). A common example is obstructive sleep apnea. You may need a sleep study if you wake up often during the night, sleepwalk, or have night terrors.   What will happen during the sleep study: Your sleep study may be done as a single night or split night test. For a single night test, your sleep will be monitored by healthcare providers all night. Split night testing is done if you stop breathing at times during the first few hours of the test. Your healthcare provider may have you use a breathing machine called a CPAP for the rest of your test. CPAP is continuous positive airway pressure. The CPAP machine blows a gentle stream of air into a mask placed over your nose and mouth.       Diagnosis: Atrial fibrillation  Assessment and Plan of Treatment: continue taking carvefilol 6.25 mg twice a day and eliquis 5 mg twice a day    Diagnosis: HTN (hypertension)  Assessment and Plan of Treatment: your blood pressure ranged between: (95/66 - 123/88)  continue taking carvedilol 6.25 mg twice a day and lasix 40 mg daily  follow up with your primary care doctor or cardiologist.  try to take your blood pressure readings twice a day, morning and night time.   Notify your doctor if you have any of the following symptoms:   Dizziness, Lightheadedness, Blurry vision, Headache, Chest pain, Shortness of breath

## 2023-06-06 NOTE — PROGRESS NOTE ADULT - PROBLEM SELECTOR PLAN 4
- continue with your home med eliquis  - continue with coreg  - rate controlled (70-80)
- continue with your home med eliquis  - continue with coreg  - rate controlled (70-80)
c/w jose

## 2023-06-06 NOTE — PHYSICAL THERAPY INITIAL EVALUATION ADULT - PERTINENT HX OF CURRENT PROBLEM, REHAB EVAL
89 yo F, from home, with PMHx HTN, COPD, Afib, HFpEF (7/29/22 EF 55-60%, LVH, GIDD, Breast CA, and remote h/o LLE DVT p/w SOB and hypoxia.

## 2023-06-06 NOTE — PHYSICAL THERAPY INITIAL EVALUATION ADULT - PASSIVE RANGE OF MOTION EXAMINATION, REHAB EVAL
A-AAROM/bilateral upper extremity Passive ROM was WFL (within functional limits)/bilateral lower extremity Passive ROM was WFL (within functional limits)

## 2023-06-06 NOTE — DISCHARGE NOTE NURSING/CASE MANAGEMENT/SOCIAL WORK - NSDCPEFALRISK_GEN_ALL_CORE
For information on Fall & Injury Prevention, visit: [email or web address] OR  [email or web address] OR  https://www.cdc.gov/steadi/patient.html

## 2023-06-06 NOTE — PROGRESS NOTE ADULT - SUBJECTIVE AND OBJECTIVE BOX
CHIEF COMPLAINT:Patient is a 89y old  Female who presents with a chief complaint of SOB.Pt appears comfortable.    	  REVIEW OF SYSTEMS:  CONSTITUTIONAL: No fever, weight loss, or fatigue  EYES: No eye pain, visual disturbances, or discharge  ENT:  No difficulty hearing, tinnitus, vertigo; No sinus or throat pain  NECK: No pain or stiffness  RESPIRATORY: No cough, wheezing, chills or hemoptysis; No Shortness of Breath  CARDIOVASCULAR: No chest pain, palpitations, passing out, dizziness, or leg swelling  GASTROINTESTINAL: No abdominal or epigastric pain. No nausea, vomiting, or hematemesis; No diarrhea or constipation. No melena or hematochezia.  GENITOURINARY: No dysuria, frequency, hematuria, or incontinence  NEUROLOGICAL: No headaches, memory loss, loss of strength, numbness, or tremors  SKIN: No itching, burning, rashes, or lesions   LYMPH Nodes: No enlarged glands  ENDOCRINE: No heat or cold intolerance; No hair loss  MUSCULOSKELETAL: No joint pain or swelling; No muscle, back, or extremity pain  PSYCHIATRIC: No depression, anxiety, mood swings, or difficulty sleeping  HEME/LYMPH: No easy bruising, or bleeding gums  ALLERGY AND IMMUNOLOGIC: No hives or eczema	        PHYSICAL EXAM:  T(C): 37 (06-06-23 @ 08:03), Max: 37 (06-06-23 @ 00:04)  HR: 63 (06-06-23 @ 08:03) (60 - 83)  BP: 118/88 (06-06-23 @ 08:03) (110/65 - 123/88)  RR: 19 (06-06-23 @ 08:03) (18 - 19)  SpO2: 97% (06-06-23 @ 08:03) (97% - 100%)  Wt(kg): --  I&O's Summary    05 Jun 2023 07:01  -  06 Jun 2023 07:00  --------------------------------------------------------  IN: 0 mL / OUT: 2200 mL / NET: -2200 mL        Appearance: Normal	  HEENT:   Normal oral mucosa, PERRL, EOMI	  Lymphatic: No lymphadenopathy  Cardiovascular: Normal S1 S2, No JVD, No murmurs, No edema  Respiratory: Lungs clear to auscultation	  Psychiatry: A & O x 3, Mood & affect appropriate  Gastrointestinal:  Soft, Non-tender, + BS	  Skin: No rashes, No ecchymoses, No cyanosis	  Neurologic: Non-focal  Extremities: Normal range of motion, No clubbing, cyanosis or edema  Vascular: Peripheral pulses palpable 2+ bilaterally    MEDICATIONS  (STANDING):  anastrozole 1 milliGRAM(s) Oral daily  apixaban 5 milliGRAM(s) Oral two times a day  budesonide 160 MICROgram(s)/formoterol 4.5 MICROgram(s) Inhaler 2 Puff(s) Inhalation two times a day  carvedilol 6.25 milliGRAM(s) Oral every 12 hours  furosemide   Injectable 20 milliGRAM(s) IV Push daily  montelukast 10 milliGRAM(s) Oral daily  simvastatin 20 milliGRAM(s) Oral at bedtime      TELEMETRY: 	afib 50-70's    	  	  LABS:	 	    Troponin I, High Sensitivity Result: 62.8 ng/L (06-03 @ 20:45)  Troponin I, High Sensitivity Result: 64.4 ng/L (06-03 @ 14:40)                            12.2   6.85  )-----------( 266      ( 05 Jun 2023 06:20 )             40.1     06-05    139  |  98  |  12  ----------------------------<  126<H>  4.0   |  39<H>  |  0.82    Ca    8.6      05 Jun 2023 06:20  Phos  3.1     06-05  Mg     1.9     06-05    TPro  6.9  /  Alb  3.1<L>  /  TBili  1.0  /  DBili  x   /  AST  10  /  ALT  17  /  AlkPhos  108  06-05      TSH: Thyroid Stimulating Hormone, Serum: 1.52 uU/mL (06-05 @ 06:20)      	  < from: Transthoracic Echocardiogram (06.05.23 @ 07:09) >  OBSERVATIONS:  Mitral Valve: Normal mitral valve. Mild mitral  regurgitation.  Aortic Root: Aortic Root: 4.2 cm.    Aortic Valve: Calcified trileaflet aortic valve with normal  opening.  Left Atrium: Mildly dilated left atrium.  LA volume index =  39 cc/m2.  Left Ventricle: Normal Left Ventricular Systolic Function,  (EF = 55 to 60%) Mild concentric left ventricular  hypertrophy. Grade II diastolic dysfunction (moderate).  Right Heart: Normal right atrium. Normal right ventricular  size and function (RV tissue Doppler .10m/s). There is mild  tricuspid regurgitation. Normal pulmonic valve.  Pericardium/PleuraNormal pericardium with no pericardial  effusion.  Hemodynamic: RV systolic pressure is severely increased at  81 mm Hg.    < end of copied text >        
NP Note discussed with  Primary Attending    Patient is a 89y old  Female who presents with a chief complaint of SOB (05 Jun 2023 09:18)      INTERVAL HPI/OVERNIGHT EVENTS: no new complaints    MEDICATIONS  (STANDING):  acetaZOLAMIDE    Tablet 250 milliGRAM(s) Oral two times a day  anastrozole 1 milliGRAM(s) Oral daily  apixaban 5 milliGRAM(s) Oral two times a day  budesonide 160 MICROgram(s)/formoterol 4.5 MICROgram(s) Inhaler 2 Puff(s) Inhalation two times a day  carvedilol 6.25 milliGRAM(s) Oral every 12 hours  montelukast 10 milliGRAM(s) Oral daily  simvastatin 20 milliGRAM(s) Oral at bedtime    MEDICATIONS  (PRN):  acetaminophen     Tablet .. 650 milliGRAM(s) Oral every 6 hours PRN Mild Pain (1 - 3)  albuterol    90 MICROgram(s) HFA Inhaler 1 Puff(s) Inhalation every 6 hours PRN Shortness of Breath and/or Wheezing      __________________________________________________  REVIEW OF SYSTEMS:    CONSTITUTIONAL: No fever,   EYES: no acute visual disturbances  NECK: No pain or stiffness  RESPIRATORY: No cough; No shortness of breath  CARDIOVASCULAR: No chest pain, no palpitations  GASTROINTESTINAL: No pain. No nausea or vomiting; No diarrhea   NEUROLOGICAL: No headache or numbness, no tremors  MUSCULOSKELETAL: No joint pain, no muscle pain  GENITOURINARY: no dysuria, no frequency, no hesitancy  PSYCHIATRY: no depression , no anxiety  ALL OTHER  ROS negative        Vital Signs Last 24 Hrs  T(C): 36.7 (05 Jun 2023 11:54), Max: 37.3 (04 Jun 2023 23:54)  T(F): 98.1 (05 Jun 2023 11:54), Max: 99.1 (04 Jun 2023 23:54)  HR: 83 (05 Jun 2023 11:54) (77 - 91)  BP: 120/72 (05 Jun 2023 11:54) (101/59 - 126/74)  BP(mean): --  RR: 19 (05 Jun 2023 11:54) (18 - 20)  SpO2: 100% (05 Jun 2023 11:54) (86% - 100%)    Parameters below as of 05 Jun 2023 11:54  Patient On (Oxygen Delivery Method): nasal cannula  O2 Flow (L/min): 2      ________________________________________________  PHYSICAL EXAM:  GENERAL: NAD  HEENT: Normocephalic;  conjunctivae and sclerae clear; moist mucous membranes;   NECK : supple  CHEST/LUNG: Diminished to auscultation bilaterally with good air entry   HEART: S1 S2  regular  ABDOMEN: Soft, Nontender, Nondistended; Bowel sounds present  EXTREMITIES: no cyanosis; + edema; no calf tenderness  SKIN: warm and dry; no rash  NERVOUS SYSTEM:  Awake and alert; Oriented  to place and person    _________________________________________________  LABS:                        12.2   6.85  )-----------( 266      ( 05 Jun 2023 06:20 )             40.1     06-05    139  |  98  |  12  ----------------------------<  126<H>  4.0   |  39<H>  |  0.82    Ca    8.6      05 Jun 2023 06:20  Phos  3.1     06-05  Mg     1.9     06-05    TPro  6.9  /  Alb  3.1<L>  /  TBili  1.0  /  DBili  x   /  AST  10  /  ALT  17  /  AlkPhos  108  06-05        CAPILLARY BLOOD GLUCOSE            RADIOLOGY & ADDITIONAL TESTS:  < from: Xray Chest 1 View-PORTABLE IMMEDIATE (06.03.23 @ 14:58) >    ACC: [id number] EXAM:  XR CHEST PORTABLE IMMED 1V   ORDERED BY: ESTHER WAYNE     PROCEDURE DATE:  06/03/2023          INTERPRETATION:  AP erect chest on Rose 3, 2023 2:40 PM. Patient is short   of breath.    Heart enlargement again noted.    Thereis persistent central CHF.    Chest is similar to January 30 this year.    IMPRESSION: Heart enlargement and central CHF again noted.    --- End of Report ---            CARMEN CARMONA MD; Attending Radiologist  This document has been electronically signed. Louie  3 2023  3:08PM    < end of copied text >    Imaging Personally Reviewed:  YES/NO    Consultant(s) Notes Reviewed:   YES/ No    Care Discussed with Consultants :     Plan of care was discussed with patient and /or primary care giver; all questions and concerns were addressed and care was aligned with patient's wishes.    
  CHIEF COMPLAINT:Patient is a 89y old  Female who presents with a chief complaint of SOB.Pt appears comfortable.    	  REVIEW OF SYSTEMS:  CONSTITUTIONAL: No fever, weight loss, or fatigue  EYES: No eye pain, visual disturbances, or discharge  ENT:  No difficulty hearing, tinnitus, vertigo; No sinus or throat pain  NECK: No pain or stiffness  RESPIRATORY: No cough, wheezing, chills or hemoptysis; No Shortness of Breath  CARDIOVASCULAR: No chest pain, palpitations, passing out, dizziness, or leg swelling  GASTROINTESTINAL: No abdominal or epigastric pain. No nausea, vomiting, or hematemesis; No diarrhea or constipation. No melena or hematochezia.  GENITOURINARY: No dysuria, frequency, hematuria, or incontinence  NEUROLOGICAL: No headaches, memory loss, loss of strength, numbness, or tremors  SKIN: No itching, burning, rashes, or lesions   LYMPH Nodes: No enlarged glands  ENDOCRINE: No heat or cold intolerance; No hair loss  MUSCULOSKELETAL: No joint pain or swelling; No muscle, back, or extremity pain  PSYCHIATRIC: No depression, anxiety, mood swings, or difficulty sleeping  HEME/LYMPH: No easy bruising, or bleeding gums  ALLERGY AND IMMUNOLOGIC: No hives or eczema	      PHYSICAL EXAM:  T(C): 37 (06-05-23 @ 07:50), Max: 37.6 (06-04-23 @ 11:20)  HR: 91 (06-05-23 @ 07:50) (70 - 91)  BP: 121/84 (06-05-23 @ 07:50) (101/59 - 126/74)  RR: 18 (06-05-23 @ 07:50) (18 - 20)  SpO2: 94% (06-05-23 @ 07:50) (86% - 99%)  Wt(kg): --  I&O's Summary    04 Jun 2023 07:01  -  05 Jun 2023 07:00  --------------------------------------------------------  IN: 0 mL / OUT: 500 mL / NET: -500 mL        Appearance: Normal	  HEENT:   Normal oral mucosa, PERRL, EOMI	  Lymphatic: No lymphadenopathy  Cardiovascular: Normal S1 S2, No JVD, No murmurs, No edema  Respiratory: Lungs clear to auscultation	  Psychiatry: A & O x 3, Mood & affect appropriate  Gastrointestinal:  Soft, Non-tender, + BS	  Skin: No rashes, No ecchymoses, No cyanosis	  Neurologic: Non-focal  Extremities: Normal range of motion, No clubbing, cyanosis or edema  Vascular: Peripheral pulses palpable 2+ bilaterally    MEDICATIONS  (STANDING):  acetaZOLAMIDE    Tablet 250 milliGRAM(s) Oral two times a day  anastrozole 1 milliGRAM(s) Oral daily  apixaban 5 milliGRAM(s) Oral two times a day  budesonide 160 MICROgram(s)/formoterol 4.5 MICROgram(s) Inhaler 2 Puff(s) Inhalation two times a day  carvedilol 6.25 milliGRAM(s) Oral every 12 hours  montelukast 10 milliGRAM(s) Oral daily  simvastatin 20 milliGRAM(s) Oral at bedtime      TELEMETRY: afib 60-70's,pvc's	    	  	  LABS:	 	    Troponin I, High Sensitivity Result: 62.8 ng/L (06-03 @ 20:45)  Troponin I, High Sensitivity Result: 64.4 ng/L (06-03 @ 14:40)                            12.2   6.85  )-----------( 266      ( 05 Jun 2023 06:20 )             40.1     06-05    139  |  98  |  12  ----------------------------<  126<H>  4.0   |  39<H>  |  0.82    Ca    8.6      05 Jun 2023 06:20  Phos  3.1     06-05  Mg     1.9     06-05    TPro  6.9  /  Alb  3.1<L>  /  TBili  1.0  /  DBili  x   /  AST  10  /  ALT  17  /  AlkPhos  108  06-05    TSH: Thyroid Stimulating Hormone, Serum: 1.52 uU/mL (06-05 @ 06:20)      	        
PGY-1 Progress Note discussed with attending    PAGER #: [[phone]] TILL 5:00 PM  PLEASE CONTACT ON CALL TEAM:  - On Call Team (Please refer to Cassy) FROM 5:00 PM - 8:30PM  - Nightfloat Team FROM 8:30 -7:30 AM    CHIEF COMPLAINT & BRIEF HOSPITAL COURSE: HPI:  89 yo F, from home, with PMHx HTN, COPD, Afib, HFpEF (7/29/22 EF 55-60%, LVH, GIDD, Breast CA, and remote h/o LLE DVT p/w SOB and hypoxia. Patient is on oxygen Nasal canula at home (unsure how many liters) but has been requiring more than her baseline over the last few days. Patient denies any coughing or chest pain. Patient was discharged from [facility] in february 2023 for AHRF secondary to COPD exacerbation. Patient denies any fevers, chills, headaches, dizziness, chest pain, cough, abd pain, nv, diarrhea, constipation, hematuria.    In ED VS:   T 98.2, HR 85, /81, RR 18, Spo2 97% 3LNC   trop 64   EKG A fib   cxray heart enlargement and central CHF  (03 Jun 2023 19:33)      INTERVAL HPI/OVERNIGHT EVENTS:       REVIEW OF SYSTEMS:  CONSTITUTIONAL: No fever, weight loss, or fatigue  RESPIRATORY: No cough, wheezing, chills or hemoptysis; No shortness of breath  CARDIOVASCULAR: No chest pain, palpitations, dizziness, or leg swelling  GASTROINTESTINAL: No abdominal pain. No nausea, vomiting, or hematemesis; No diarrhea or constipation. No melena or hematochezia.  GENITOURINARY: No dysuria or hematuria, urinary frequency  NEUROLOGICAL: No headaches, memory loss, loss of strength, numbness, or tremors  SKIN: No itching, burning, rashes, or lesions     Vital Signs Last 24 Hrs  T(C): 37 (04 Jun 2023 07:11), Max: 37.6 (04 Jun 2023 04:56)  T(F): 98.6 (04 Jun 2023 07:11), Max: 99.7 (04 Jun 2023 04:56)  HR: 61 (04 Jun 2023 07:11) (54 - 85)  BP: 114/62 (04 Jun 2023 07:11) (102/65 - 139/85)  BP(mean): --  RR: 19 (04 Jun 2023 07:11) (18 - 20)  SpO2: 99% (04 Jun 2023 07:11) (95% - 100%)    Parameters below as of 04 Jun 2023 07:11  Patient On (Oxygen Delivery Method): nasal cannula  O2 Flow (L/min): 2      PHYSICAL EXAMINATION:  GENERAL: NAD, well built  HEAD:  Atraumatic, Normocephalic  EYES:  conjunctiva and sclera clear  NECK: Supple, No JVD, Normal thyroid  CHEST/LUNG: Clear to auscultation. Clear to percussion bilaterally; No rales, rhonchi, wheezing, or rubs  HEART: Regular rate and rhythm; No murmurs, rubs, or gallops  ABDOMEN: Soft, Nontender, Nondistended; Bowel sounds present  NERVOUS SYSTEM:  Alert & Oriented X3,    EXTREMITIES:  2+ Peripheral Pulses, No clubbing, cyanosis, or edema  SKIN: warm dry                          12.6   6.93  )-----------( 253      ( 04 Jun 2023 06:22 )             41.4     06-04    138  |  102  |  7   ----------------------------<  128<H>  4.3   |  34<H>  |  0.65    Ca    9.0      04 Jun 2023 06:22  Phos  3.7     06-04  Mg     2.0     06-04    TPro  7.1  /  Alb  3.2<L>  /  TBili  0.8  /  DBili  x   /  AST  11  /  ALT  19  /  AlkPhos  114  06-04    LIVER FUNCTIONS - ( 04 Jun 2023 06:22 )  Alb: 3.2 g/dL / Pro: 7.1 g/dL / ALK PHOS: 114 U/L / ALT: 19 U/L DA / AST: 11 U/L / GGT: x                   CAPILLARY BLOOD GLUCOSE      RADIOLOGY & ADDITIONAL TESTS:                  
Patient was seen and examined  Patient is a 89y old  Female who presents with a chief complaint of SOB (06 Jun 2023 09:54)      INTERVAL HPI/OVERNIGHT EVENTS:  T(C): 37 (06-06-23 @ 08:03), Max: 37 (06-06-23 @ 00:04)  HR: 63 (06-06-23 @ 08:03) (60 - 83)  BP: 118/88 (06-06-23 @ 08:03) (110/65 - 123/88)  RR: 19 (06-06-23 @ 08:03) (18 - 19)  SpO2: 97% (06-06-23 @ 08:03) (97% - 100%)  Wt(kg): --  I&O's Summary    05 Jun 2023 07:01  -  06 Jun 2023 07:00  --------------------------------------------------------  IN: 0 mL / OUT: 2200 mL / NET: -2200 mL        LABS:                        12.2   6.85  )-----------( 266      ( 05 Jun 2023 06:20 )             40.1     06-05    139  |  98  |  12  ----------------------------<  126<H>  4.0   |  39<H>  |  0.82    Ca    8.6      05 Jun 2023 06:20  Phos  3.1     06-05  Mg     1.9     06-05    TPro  6.9  /  Alb  3.1<L>  /  TBili  1.0  /  DBili  x   /  AST  10  /  ALT  17  /  AlkPhos  108  06-05        CAPILLARY BLOOD GLUCOSE                  MEDICATIONS  (STANDING):  anastrozole 1 milliGRAM(s) Oral daily  apixaban 5 milliGRAM(s) Oral two times a day  budesonide 160 MICROgram(s)/formoterol 4.5 MICROgram(s) Inhaler 2 Puff(s) Inhalation two times a day  carvedilol 6.25 milliGRAM(s) Oral every 12 hours  furosemide    Tablet 40 milliGRAM(s) Oral daily  montelukast 10 milliGRAM(s) Oral daily  simvastatin 20 milliGRAM(s) Oral at bedtime    MEDICATIONS  (PRN):  acetaminophen     Tablet .. 650 milliGRAM(s) Oral every 6 hours PRN Mild Pain (1 - 3)  albuterol    90 MICROgram(s) HFA Inhaler 1 Puff(s) Inhalation every 6 hours PRN Shortness of Breath and/or Wheezing      RADIOLOGY & ADDITIONAL TESTS:    Imaging Personally Reviewed:  [ ] YES  [ ] NO    REVIEW OF SYSTEMS:  CONSTITUTIONAL: No fever, weight loss, or fatigue  EYES: No eye pain, visual disturbances, or discharge  ENMT:  No difficulty hearing, tinnitus, vertigo; No sinus or throat pain  NECK: No pain or stiffness  BREASTS: No pain, masses, or nipple discharge  RESPIRATORY: No cough, wheezing, chills or hemoptysis; No shortness of breath  CARDIOVASCULAR: No chest pain, palpitations, dizziness, or leg swelling  GASTROINTESTINAL: No abdominal or epigastric pain. No nausea, vomiting, or hematemesis; No diarrhea or constipation. No melena or hematochezia.  GENITOURINARY: No dysuria, frequency, hematuria, or incontinence  NEUROLOGICAL: No headaches, memory loss, loss of strength, numbness, or tremors  SKIN: No itching, burning, rashes, or lesions   LYMPH NODES: No enlarged glands  ENDOCRINE: No heat or cold intolerance; No hair loss  MUSCULOSKELETAL: No joint pain or swelling; No muscle, back, or extremity pain  PSYCHIATRIC: No depression, anxiety, mood swings, or difficulty sleeping  HEME/LYMPH: No easy bruising, or bleeding gums  ALLERY AND IMMUNOLOGIC: No hives or eczema      Consultant(s) Notes Reviewed:  [ x ] YES  [ ] NO    PHYSICAL EXAM:  GENERAL: NAD, well-groomed, well-developed  HEAD:  Atraumatic, Normocephalic  EYES: EOMI, PERRLA, conjunctiva and sclera clear  ENMT: No tonsillar erythema, exudates, or enlargement; Moist mucous membranes, Good dentition, No lesions  NECK: Supple, No JVD, Normal thyroid  NERVOUS SYSTEM:  Alert & Oriented X3, Good concentration; Motor Strength 5/5 B/L upper and lower extremities; DTRs 2+ intact and symmetric  CHEST/LUNG: Clear to percussion bilaterally; No rales, rhonchi, wheezing, or rubs  HEART: Regular rate and rhythm; No murmurs, rubs, or gallops  ABDOMEN: Soft, Nontender, Nondistended; Bowel sounds present  EXTREMITIES:  2+ Peripheral Pulses, No clubbing, cyanosis, or edema  LYMPH: No lymphadenopathy noted  SKIN: No rashes or lesions    Care Discussed with Consultants/Other Providers [ x] YES  [ ] NO

## 2023-06-06 NOTE — PROGRESS NOTE ADULT - PROBLEM SELECTOR PLAN 2
p/w SOB and hypoxia  - continue lasix 40mg daily  - diamox x 2 doses    - Dr Siu following
p/w SOB and hypoxia  - continue lasix 20mg daily  - diamox x 2 doses  - F/U echo  - Dr Sui following
same plane as above

## 2023-08-28 NOTE — PROGRESS NOTE ADULT - PROBLEM SELECTOR PROBLEM 8
Pt's mom requested to complete school immunization form and fax it back to school  Mom notified to have pt schedule physical when she is back home, form is completed and given to Mg for signature and then fax to school Afib

## 2023-09-18 NOTE — PHYSICAL THERAPY INITIAL EVALUATION ADULT - SIT-TO-STAND BALANCE
fair balance Isotretinoin Counseling: Patient should get monthly blood tests, not donate blood, not drive at night if vision affected, not share medication, and not undergo elective surgery for 6 months after tx completed. Side effects reviewed, pt to contact office should one occur.

## 2023-10-11 NOTE — PHYSICAL THERAPY INITIAL EVALUATION ADULT - GENERAL OBSERVATIONS, REHAB EVAL
Jose called. She has not been able to sleep at night because of coughing. Said she coughs all night long. Is asking if you will prescribe a cough medicine (she said "not the one with codeine") and send it to [facility] in [place]. Has been coughing for a week. She said she as an appointment with Dr Marisa Clay this afternoon but says he will tell her that she needs to ask you for that. I asked her if she needed an appointment to have you listen to her chest and she said that she has an appointment with you on the 23rd and will have you listen to it then.
Consult received ,EMR, radiology and labs reviewed. Patient received supine in bed, NAD, family at bedside . Patient agreed to EVALUATION from Physical Therapist.
patient recent transfers from ED, PT referral follow up to medical floor, receive responsive, appears lack of sleep, on supplemental nc 02, assistance with bedmobility assessment required to weakness

## 2023-12-08 NOTE — DIETITIAN INITIAL EVALUATION ADULT - NUTRITION DIAGNOSIS
Sakina Mitchell Patient Age: 62 year old  MESSAGE:   Patient had a holtor 3 days ago , requesting results ,      WEIGHT AND HEIGHT:   Wt Readings from Last 1 Encounters:   11/21/23 91.1 kg (200 lb 12.8 oz)     Ht Readings from Last 1 Encounters:   11/21/23 5' 9\" (1.753 m)     BMI Readings from Last 1 Encounters:   11/21/23 29.65 kg/m²       ALLERGIES:  Cholinesterase inhibitors, Seasonal, Anesthesia s-i-60 (propofol), and Thimerosal  Current Outpatient Medications   Medication Sig Dispense Refill    metoPROLOL succinate (TOPROL-XL) 25 MG 24 hr tablet Take 0.5 tablets by mouth every evening. 45 tablet 3    famotidine (PEPCID) 10 MG tablet Take 10 mg by mouth as needed.      cycloSPORINE (cycloSPORINE in [place]) 0.1 % Emulsion Apply 1 drop to eye in the morning and 1 drop in the evening. 5.5 mL 3    Omega-3 Fatty Acids (Fish Oil) 1000 MG capsule       metroNIDAZOLE (METROCREAM) 0.75 % cream Apply to rosacea of face qd 45 g 3    Magnesium Citrate 200 MG Tab Take 200 mg by mouth daily.      azelastine (ASTELIN) 0.1 % nasal spray Spray 1 spray in each nostril 2 times daily. Use in each nostril as directed 30 mL 1    FOLIC ACID PO Take 1 tablet by mouth every other day.       CALCIUM-VITAMIN D PO Take 1 tablet by mouth daily.       MULTIPLE VITAMIN PO Take 1 tablet by mouth daily.        No current facility-administered medications for this visit.     PHARMACY to use: [facility]           Pharmacy preference(s) on file:   [id number] - [address] [place] [address] [address]  Phone: [phone] Fax: [phone]    [facility] [place] [place] - [address]  [address]  [facility] [address]  Phone: [phone] Fax: [phone]      CALL BACK INFO: Ok to leave response (including medical information) with family member or on answering machine  ROUTING: Patient's physician/staff        PCP: Clarissa Miller MD          INS: Payor: [employer] / Plan: PERI NORIEGA [id number] / Product Type: [employer]   PATIENT ADDRESS:  [address] [place] [id number]   yes...

## 2024-01-06 NOTE — PHYSICAL THERAPY INITIAL EVALUATION ADULT - DISCHARGE DISPOSITION, PT EVAL
Negative Recommending SUB-ACUTE REHAB for intensive strengthening, gait, balance and transfer training for safer transition to home./rehabilitation facility

## 2024-02-28 NOTE — ED ADULT NURSE NOTE - NSIMPLEMENTINTERV_GEN_ALL_ED
Implemented All Fall Risk Interventions:  [place] to call system. Call bell, personal items and telephone within reach. Instruct patient to call for assistance. Room bathroom lighting operational. Non-slip footwear when patient is off stretcher. Physically safe environment: no spills, clutter or unnecessary equipment. Stretcher in lowest position, wheels locked, appropriate side rails in place. Provide visual cue, wrist band, yellow gown, etc. Monitor gait and stability. Monitor for mental status changes and reorient to person, place, and time. Review medications for side effects contributing to fall risk. Reinforce activity limits and safety measures with patient and family.
No

## 2024-05-30 NOTE — PHYSICAL THERAPY INITIAL EVALUATION ADULT - PERTINENT HX OF CURRENT PROBLEM, REHAB EVAL
No change Pt. was sent from PCP office for Hypoxia, with associated SOB and YUAN for 2 days. Pt. found to be Covid (+).

## 2024-06-05 NOTE — H&P ADULT - PROBLEM SELECTOR PROBLEM 3
Detail Level: Detailed Plan: Lesion adjacent to prior treated AK superior scalp is typical tan w/ horn cysts SK. Fall

## 2024-08-09 NOTE — ED PROVIDER NOTE - SKIN, MLM
[place] ambulatory encounter    URGENT CARE INITIAL EVALUATION    CHIEF COMPLAINT:    Chief Complaint   Patient presents with   • Cold Symptoms     I was extremely cold and shaky yesterday with a 101.9 fever for several hours. The temperature came down but I have a productive yellow mucus cough. - Entered by patient    No recent travel - home covid test yesterday was normal, no others ill at home.  Did attend family reunion outdoors last week.  Reports cough - productive (yellow expectorant), raspy voice, reports gets bronchitis often, took tylenol at 0800 hours this morning.  Tachypneic - has both COPD and asthma.        SUBJECTIVE:  Nimo Shelley is a 74 year old female, who presents with a complaint of cough, congestion as well as a fever.  Patient reports that symptoms have been ongoing for few days now.  Patient does have history of COPD and asthma, patient uses CPAP and oxygen at 2 L at night.  No changes in her baseline oxygen requirement.  Patient is anticoagulated.  Patient reports that the cough is productive.  Denies any pain with deep inspiration or hemoptysis.  Patient did had a fever up to one 101.9 yesterday and feeling achy cold and shaky.  Patient reports no recent travel.  Denies any contact with similar symptoms.  Denies any headache.  Reports nasal congestion.  Reports sore throat or difficulty swallowing.  Denies any abdominal pain, nausea, vomiting or diarrhea.  Patient reports normal EDMOND, no orthopnea or palpitations.  Reports no chest pain or shortness of breath.  Denies any other self attempts for treatment, additional injuries, complaints or modifying factors.    REVIEW OF SYSTEMS:  A review of systems was performed and findings relevant to this complaint are included in the HPI.    HISTORIES:  ALLERGIES:   Allergen Reactions   • Bee Pollen ANAPHYLAXIS, PRURITUS, Other (See Comments), SHORTNESS OF BREATH and SWELLING   • Wasp Venom ANAPHYLAXIS, PRURITUS, Other (See Comments), SHORTNESS OF  BREATH and SWELLING   • Meperidine Nausea & Vomiting   • Bee      Extreme swelling   • Cat Dander Other (See Comments)   • Demerol    • Duricef [Cefadroxil Monohydrate]    • Mold   (Environmental) Other (See Comments)   • Cat Hair Extract PRURITUS   • Cefadroxil RASH       MEDICATIONS:  Current Outpatient Medications   Medication Sig   • amoxicillin-clavulanate (AUGMENTIN) 875-125 MG per tablet Take 1 tablet by mouth in the morning and 1 tablet in the evening. Do all this for 10 days.   • doxycycline hyclate (VIBRA-TABS) 100 MG tablet Take 1 tablet by mouth in the morning and 1 tablet in the evening. Do all this for 10 days.   • predniSONE (DELTASONE) 50 MG tablet Take 1 tablet by mouth daily for 5 days.   • sulfaSALAzine (AZULFIDINE) 500 MG tablet Take two in the morning and one in the evening with food.   • hydroxychloroquine (PLAQUENIL) 200 MG tablet Take 1 tablet by mouth in the morning and 1 tablet in the evening.   • albuterol (ProAir HFA) 108 (90 Base) MCG/ACT inhaler Inhale 2 puffs into the lungs every 6 hours as needed for Shortness of Breath.   • TEMazepam (RESTORIL) 15 MG capsule TAKE 1 CAPSULE BY MOUTH NIGHTLY AS NEEDED FOR SLEEP.   • montelukast (SINGULAIR) 10 MG tablet TAKE 1 TABLET BY MOUTH EVERY DAY AT NIGHT   • omeprazole (PrilOSEC) 20 MG capsule TAKE 1 CAPSULE BY MOUTH EVERY DAY   • fluticasone-salmeterol (Wixela Inhub) 500-50 MCG/ACT inhaler Inhale 1 puff into the lungs in the morning and 1 puff in the evening.   • DENOSumab (PROLIA) 60 MG/ML Solution Prefilled Syringe Inject 60 mg into the skin every 6 months.   • benzonatate (TESSALON PERLES) 200 MG capsule Take 1 capsule by mouth 3 times daily as needed for Cough.   • theophylline (UNIPHYL SR) 400 MG 24 hr tablet TAKE 1 TABLET BY MOUTH EVERY DAY   • ipratropium-albuterol (DUONEB) 0.5-2.5 (3) MG/3ML nebulizer solution Take 3 mLs by nebulization every 4 hours as needed for Wheezing or Shortness of Breath.   • albuterol (VENTOLIN) (2.5 MG/3ML)  0.083% nebulizer solution Take 3 mLs by nebulization every 6 hours as needed for Wheezing.   • levocetirizine (XYZAL) 5 MG tablet TAKE 1 TABLET BY MOUTH EVERY DAY IN THE EVENING   • folic acid (FOLATE) 1 MG tablet TAKE 1 TABLET BY MOUTH EVERY DAY   • acetaminophen (TYLENOL) 650 MG CR tablet Take 1,300 mg by mouth 2 times daily as needed for Pain.   • fluticasone (VERAMYST) 27.5 MCG/SPRAY nasal spray Spray 2 sprays in each nostril daily.   • naLOXone (NARCAN) 4 MG/0.1ML nasal spray Spray the content of 1 device into 1 nostril. Call 911. May repeat with 2nd device in alternate nostril if no response in 2-3 minutes.   • furosemide (LASIX) 40 MG tablet Take 1.5 tablets by mouth daily. Indications: Edema   • atorvastatin (LIPITOR) 20 MG tablet Take 20 mg by mouth daily.   • lisinopril (ZESTRIL) 5 MG tablet Take 5 mg by mouth nightly.   • Ferrous Sulfate (Iron) 325 (65 Fe) MG Tab Take 325 mg by mouth daily.   • cyanocobalamin 1000 MCG tablet Take 1,000 mcg by mouth daily.   • calcium carbonate-vitamin D (CALTRATE+D) 600-400 MG-UNIT per tablet Take 1 tablet by mouth daily.   • warfarin (COUMADIN) 5 MG tablet Take 5 mg by mouth nightly. Take 5 mg everynight   • VOLTAREN 1 % gel Apply topically 4 times daily as needed.    • EPINEPHrine 0.3 MG/0.3ML SOAJ    • metoPROLOL succinate (TOPROL-XL) 100 MG 24 hr tablet Take 100 mg by mouth 2 times daily.      No current facility-administered medications for this visit.       Past Medical History:   Diagnosis Date   • Arthritis    • Asthma (CMD)    • Atrial fibrillation  (CMD)    • Chronic bronchitis  (CMD)    • Chronic pain    • Chronic right-sided low back pain with right-sided sciatica 9/8/2022   • Chronic sinusitis    • Chronic systolic heart failure  (CMD)    • COPD (chronic obstructive pulmonary disease)  (CMD)    • Edema     Chronic bilateral legs   • Essential (primary) hypertension    • Fall 11/2014   • Fracture 2010    Humerus   • GERD (gastroesophageal reflux disease)    •  High cholesterol    • Hypercalcemia    • Long-term use of immunosuppressant medication 2021   • LVH (left ventricular hypertrophy)    • SHMUEL on CPAP     with 2 liters Oxygen   • Osteoporosis    • Osteoporosis with fracture 2022   • Palpitations     History SVT   • Pneumonia    • Rheumatoid arthritis    • Seronegative rheumatoid arthritis  (CMD) 2021   • TIA (transient ischemic attack)    • Urinary tract infection    • Use of cane as ambulatory aid    • Wears glasses      Past Surgical History:   Procedure Laterality Date   • Colonoscopy  2011   • Cyst removal Left 2015    Excision Knee area   • 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, Bunionectomy   • Foot surgery Right 2012    Sam, Hugoionectomy   • Joint replacement Right 2005    Total Knee with revision 2006   • Joint replacement Left 2005    Total Knee with revision 2008   • Joint replacement Right 2014    Reverse Total Shoulder   • Knee scope,diagnostic Right 2002   • Parathyroidectomy      hyperthyroidism   • Patellar tendon repair Left 2008   • Rotator cuff repair Right    • Temporomandibular joint surgery     • Vein ligation Right ~     Social History     Tobacco Use   • Smoking status: Former     Current packs/day: 0.00     Average packs/day: 2.0 packs/day for 22.0 years (44.0 ttl pk-yrs)     Types: Cigarettes     Start date: 1962     Quit date: 1984     Years since quittin.0   • Smokeless tobacco: Never   Vaping Use   • Vaping status: never used   Substance Use Topics   • Alcohol use: Yes     Alcohol/week: 0.8 standard drinks of alcohol     Types: 1 Standard drinks or equivalent per week     Comment: Rarely   • Drug use: No     Social History     Tobacco Use   Smoking Status Former   • Current packs/day: 0.00   • Average packs/day: 2.0 packs/day for 22.0 years (44.0 ttl pk-yrs)   • Types: Cigarettes   • Start date: 1962   • Quit date:  1984   • Years since quittin.0   Smokeless Tobacco Never       OBJECTIVE:  PHYSICAL EXAM:  Visit Vitals  /61   Pulse 75   Temp 98.9 °F (37.2 °C) (Temporal)   Resp 18   SpO2 94%     General:  Well hydrated female who appears in no acute distress.  Eyes:  No jaundice, injection or drainage.  Ears:  Normal canals.  Normal tympanic membranes.   No external tenderness.  Oral Cavity:  No trismus.  Good dentition.  No lesions of gums or hard palate.  Neck:  Anterior and posterior lymphadenopathy.  Supple.  Lungs: Coarse sounds noted throughout.  Mild expiratory wheezing especially at the right lung fields.  Cardiac:  Regular rate and rhythm.  No murmur.  Abdomen:  Soft.  Nontender.  Nondistended.  No hepatomegaly, no splenomegaly.  No rash.    URGENT CARE COURSE:  Clinical findings were discussed with the patient.  I believe the patient would benefit from having x-ray done.  X-ray performed, shows evidence of pneumonia.  I discussed with the patient the results of imaging, COVID test is negative.  Summation, patient speaking in full sentences, patient does have coarse sounds but no significant expiratory wheezing.  Patient is saturating over 90% on room air with no signs of respiratory distress whatsoever.  I discussed with the patient risk and benefits of antibiotic therapy.  Based on recommendations past medical history, patient will require 2 antibiotic.  Patient was prescribed Augmentin and doxycycline.  Patient advised to take a probiotic over-the-counter while taking antibiotics.  Patient also feels that she would benefit from steroid therapy therefore the patient was given prednisone for 5 days.  Patient does feel comfortable managing this as outpatient.  Patient was invited to return or seek emergency care with any new or worsening symptoms especially any chest pain, shortness of breath.  Also with any oxygen saturations below 90%.  The patient is in agreement with the plan, prophylaxis) the patient  is comfortable with this plan of care.    Second examination at the time of discharge, patient speaking in full sentences, no audible wheezing, no stridor.  The patient appears to be breathing without significant effort, the patient does appear safe for outpatient follow-up.    ASSESSMENT:  1. Cough, unspecified type    2. Pneumonia of right lung due to infectious organism, unspecified part of lung          PLAN:  Orders Placed This Encounter   • XR CHEST PA AND LATERAL 2 VIEWS   • POCT SARS-COV-2 ANTIGEN/FLU ANTIGEN PANEL   • amoxicillin-clavulanate (AUGMENTIN) 875-125 MG per tablet   • doxycycline hyclate (VIBRA-TABS) 100 MG tablet   • predniSONE (DELTASONE) 50 MG tablet       FOLLOW-UP:  Follow-up indicated with your primary care provider for close monitoring of INR while taking antibiotics.  Eliquis also indicated with your pulmonology service, they will be reaching out to you to follow-up on Monday.    PATIENT INSTRUCTIONS:      The patient was advised to follow up with primary physician or to recheck with the urgent care clinic sooner if symptoms get worse or if new symptoms appear.    The patient indicated understanding of the diagnosis and agreed with the plan of care.    Skin normal color for race, warm, dry and intact. No evidence of rash.

## 2024-08-19 ENCOUNTER — EMERGENCY (EMERGENCY)
Facility: HOSPITAL | Age: 89
LOS: 1 days | Discharge: HOME | End: 2024-08-19
Attending: STUDENT IN AN ORGANIZED HEALTH CARE EDUCATION/TRAINING PROGRAM
Payer: MEDICARE

## 2024-08-19 VITALS
HEART RATE: 80 BPM | HEIGHT: 69 IN | WEIGHT: 199.96 LBS | TEMPERATURE: 98 F | SYSTOLIC BLOOD PRESSURE: 125 MMHG | DIASTOLIC BLOOD PRESSURE: 83 MMHG | OXYGEN SATURATION: 94 % | RESPIRATION RATE: 18 BRPM

## 2024-08-19 LAB
ALBUMIN SERPL ELPH-MCNC: 3.2 G/DL — LOW (ref 3.5–5)
ALP SERPL-CCNC: 147 U/L — HIGH (ref 40–120)
ALT FLD-CCNC: 52 U/L DA — SIGNIFICANT CHANGE UP (ref 10–60)
ANION GAP SERPL CALC-SCNC: 5 MMOL/L — SIGNIFICANT CHANGE UP (ref 5–17)
APPEARANCE UR: CLEAR — SIGNIFICANT CHANGE UP
APTT BLD: 38.6 SEC — HIGH (ref 24.5–35.6)
AST SERPL-CCNC: 33 U/L — SIGNIFICANT CHANGE UP (ref 10–40)
BACTERIA # UR AUTO: ABNORMAL /HPF
BASOPHILS # BLD AUTO: 0.07 K/UL — SIGNIFICANT CHANGE UP (ref 0–0.2)
BASOPHILS NFR BLD AUTO: 1 % — SIGNIFICANT CHANGE UP (ref 0–2)
BILIRUB SERPL-MCNC: 0.7 MG/DL — SIGNIFICANT CHANGE UP (ref 0.2–1.2)
BILIRUB UR-MCNC: ABNORMAL
BUN SERPL-MCNC: 20 MG/DL — HIGH (ref 7–18)
CALCIUM SERPL-MCNC: 8.8 MG/DL — SIGNIFICANT CHANGE UP (ref 8.4–10.5)
CHLORIDE SERPL-SCNC: 98 MMOL/L — SIGNIFICANT CHANGE UP (ref 96–108)
CO2 SERPL-SCNC: 36 MMOL/L — HIGH (ref 22–31)
COLOR SPEC: SIGNIFICANT CHANGE UP
COMMENT - URINE: SIGNIFICANT CHANGE UP
CREAT SERPL-MCNC: 1 MG/DL — SIGNIFICANT CHANGE UP (ref 0.5–1.3)
DIFF PNL FLD: ABNORMAL
EGFR: 53 ML/MIN/1.73M2 — LOW
EOSINOPHIL # BLD AUTO: 1.15 K/UL — HIGH (ref 0–0.5)
EOSINOPHIL NFR BLD AUTO: 15.7 % — HIGH (ref 0–6)
EPI CELLS # UR: PRESENT
FLUAV AG NPH QL: SIGNIFICANT CHANGE UP
FLUBV AG NPH QL: SIGNIFICANT CHANGE UP
GLUCOSE SERPL-MCNC: 136 MG/DL — HIGH (ref 70–99)
GLUCOSE UR QL: NEGATIVE MG/DL — SIGNIFICANT CHANGE UP
HCT VFR BLD CALC: 38.6 % — SIGNIFICANT CHANGE UP (ref 34.5–45)
HGB BLD-MCNC: 12.1 G/DL — SIGNIFICANT CHANGE UP (ref 11.5–15.5)
IMM GRANULOCYTES NFR BLD AUTO: 0.4 % — SIGNIFICANT CHANGE UP (ref 0–0.9)
INR BLD: 2.09 RATIO — HIGH (ref 0.85–1.18)
KETONES UR-MCNC: ABNORMAL MG/DL
LACTATE SERPL-SCNC: 1 MMOL/L — SIGNIFICANT CHANGE UP (ref 0.7–2)
LEUKOCYTE ESTERASE UR-ACNC: ABNORMAL
LYMPHOCYTES # BLD AUTO: 1.34 K/UL — SIGNIFICANT CHANGE UP (ref 1–3.3)
LYMPHOCYTES # BLD AUTO: 18.3 % — SIGNIFICANT CHANGE UP (ref 13–44)
MCHC RBC-ENTMCNC: 31 PG — SIGNIFICANT CHANGE UP (ref 27–34)
MCHC RBC-ENTMCNC: 31.3 GM/DL — LOW (ref 32–36)
MCV RBC AUTO: 99 FL — SIGNIFICANT CHANGE UP (ref 80–100)
MONOCYTES # BLD AUTO: 0.69 K/UL — SIGNIFICANT CHANGE UP (ref 0–0.9)
MONOCYTES NFR BLD AUTO: 9.4 % — SIGNIFICANT CHANGE UP (ref 2–14)
NEUTROPHILS # BLD AUTO: 4.05 K/UL — SIGNIFICANT CHANGE UP (ref 1.8–7.4)
NEUTROPHILS NFR BLD AUTO: 55.2 % — SIGNIFICANT CHANGE UP (ref 43–77)
NITRITE UR-MCNC: NEGATIVE — SIGNIFICANT CHANGE UP
NRBC # BLD: 0 /100 WBCS — SIGNIFICANT CHANGE UP (ref 0–0)
PH UR: 5.5 — SIGNIFICANT CHANGE UP (ref 5–8)
PLATELET # BLD AUTO: 222 K/UL — SIGNIFICANT CHANGE UP (ref 150–400)
POTASSIUM SERPL-MCNC: 3.8 MMOL/L — SIGNIFICANT CHANGE UP (ref 3.5–5.3)
POTASSIUM SERPL-SCNC: 3.8 MMOL/L — SIGNIFICANT CHANGE UP (ref 3.5–5.3)
PROT SERPL-MCNC: 7.6 G/DL — SIGNIFICANT CHANGE UP (ref 6–8.3)
PROT UR-MCNC: ABNORMAL MG/DL
PROTHROM AB SERPL-ACNC: 23.3 SEC — HIGH (ref 9.5–13)
RBC # BLD: 3.9 M/UL — SIGNIFICANT CHANGE UP (ref 3.8–5.2)
RBC # FLD: 13.5 % — SIGNIFICANT CHANGE UP (ref 10.3–14.5)
RBC CASTS # UR COMP ASSIST: 5 /HPF — HIGH (ref 0–4)
SARS-COV-2 RNA SPEC QL NAA+PROBE: SIGNIFICANT CHANGE UP
SODIUM SERPL-SCNC: 139 MMOL/L — SIGNIFICANT CHANGE UP (ref 135–145)
SP GR SPEC: 1.02 — SIGNIFICANT CHANGE UP (ref 1–1.03)
UROBILINOGEN FLD QL: 4 MG/DL (ref 0.2–1)
WBC # BLD: 7.33 K/UL — SIGNIFICANT CHANGE UP (ref 3.8–10.5)
WBC # FLD AUTO: 7.33 K/UL — SIGNIFICANT CHANGE UP (ref 3.8–10.5)
WBC UR QL: 15 /HPF — HIGH (ref 0–5)

## 2024-08-19 PROCEDURE — 87086 URINE CULTURE/COLONY COUNT: CPT

## 2024-08-19 PROCEDURE — 82962 GLUCOSE BLOOD TEST: CPT

## 2024-08-19 PROCEDURE — 85730 THROMBOPLASTIN TIME PARTIAL: CPT

## 2024-08-19 PROCEDURE — 85025 COMPLETE CBC W/AUTO DIFF WBC: CPT

## 2024-08-19 PROCEDURE — 93010 ELECTROCARDIOGRAM REPORT: CPT

## 2024-08-19 PROCEDURE — 80053 COMPREHEN METABOLIC PANEL: CPT

## 2024-08-19 PROCEDURE — 81001 URINALYSIS AUTO W/SCOPE: CPT

## 2024-08-19 PROCEDURE — 36415 COLL VENOUS BLD VENIPUNCTURE: CPT

## 2024-08-19 PROCEDURE — 71045 X-RAY EXAM CHEST 1 VIEW: CPT

## 2024-08-19 PROCEDURE — 99285 EMERGENCY DEPT VISIT HI MDM: CPT

## 2024-08-19 PROCEDURE — 87040 BLOOD CULTURE FOR BACTERIA: CPT

## 2024-08-19 PROCEDURE — 71045 X-RAY EXAM CHEST 1 VIEW: CPT | Mod: 26

## 2024-08-19 PROCEDURE — 87636 SARSCOV2 & INF A&B AMP PRB: CPT

## 2024-08-19 PROCEDURE — 99285 EMERGENCY DEPT VISIT HI MDM: CPT | Mod: 25

## 2024-08-19 PROCEDURE — 83605 ASSAY OF LACTIC ACID: CPT

## 2024-08-19 PROCEDURE — 85610 PROTHROMBIN TIME: CPT

## 2024-08-19 PROCEDURE — 96374 THER/PROPH/DIAG INJ IV PUSH: CPT

## 2024-08-19 PROCEDURE — 93005 ELECTROCARDIOGRAM TRACING: CPT

## 2024-08-19 RX ORDER — CEFAZOLIN SODIUM 10 G
1000 VIAL (EA) INJECTION ONCE
Refills: 0 | Status: COMPLETED | OUTPATIENT
Start: 2024-08-19 | End: 2024-08-19

## 2024-08-19 RX ORDER — SULFAMETHOXAZOLE AND TRIMETHOPRIM 400; 80 MG/1; MG/1
1 TABLET ORAL
Qty: 14 | Refills: 0
Start: 2024-08-19 | End: 2024-08-25

## 2024-08-19 RX ADMIN — Medication 100 MILLIGRAM(S): at 02:46

## 2024-08-19 NOTE — ED PROVIDER NOTE - OBJECTIVE STATEMENT
89-year-old female presents to the ED for generalized weakness.  Patient reports feeling tired and lethargic.  Daughter at the bedside reports she finished her last dose of antibiotic for a UTI.  Reports she was taking nitrofurantoin for the last week.  Patient at the bedside states she is tired but has no specific complaints otherwise.    General: Elderly, frail appearing, obese.  HEENT: Loss of orbital fat. Thinning of eyebrows. Dry mucous membranes. Poor dentition.  Heart: RRR. Systolic murmur.   Lungs: Diminished lungs sounds, CTA b/l  Chest/Back: Non-tender chest wall. No CVAT. Kyphosis.  Abdomen: Soft, non-tender, non-distended.  Neuro: No focal deficits. Bilateral Presbycusis.  Extremities: Decreased ROM in hips/knee/shoulders. Pulses intact x 4.  Skin: Thin. Dry. Normal color. No rashes. Capillary refill intact.  Psych: Calm, cooperative.

## 2024-08-19 NOTE — ED ADULT NURSE NOTE - OBJECTIVE STATEMENT
pt biba for generalized weakness for 1 day. pt c/o feeling tired and lethargic. pt daughter states pt has had decreased eating. pt daughter states pt is currently on abx for UTI. pt denies any pain.

## 2024-08-19 NOTE — ED ADULT NURSE NOTE - CINV DISCH MEDS REVIEWED YN
Render Post-Care Instructions In Note?: no Consent: The patient's consent was obtained including but not limited to risks of crusting, scabbing, blistering, scarring, darker or lighter pigmentary change, recurrence, incomplete removal and infection. Medical Necessity Information: It is in your best interest to select a reason for this procedure from the list below. All of these items fulfill various CMS LCD requirements except the new and changing color options. Detail Level: Detailed Medical Necessity Clause: This procedure was medically necessary because the lesions that were treated were: Post-Care Instructions: I reviewed with the patient in detail post-care instructions. Patient is to wear sunprotection, and avoid picking at any of the treated lesions. Pt may apply Vaseline to crusted or scabbing areas. No

## 2024-08-19 NOTE — ED PROVIDER NOTE - NSFOLLOWUPINSTRUCTIONS_ED_ALL_ED_FT
You have been evaluated in the Emergency Department today for a UTI.     Please follow up with your primary care physician within two days.    Return to the Emergency Department if you experience worsening or new concerning symptoms.    Thank you for choosing us for your care. Although you have been discharged from the emergency department, this does not mean that you have a "clean bill of health".   If you experience any new or worsening symptoms or if you are concerned you can always come back to the emergency for a re-evaluation.    Some results may not be available at the time of your discharge from the hospital. You can download the   FOLLOW MY HEALTH [place] and have access to these results.

## 2024-08-19 NOTE — ED PROVIDER NOTE - PROGRESS NOTE DETAILS
Patient is overall well-appearing, labs and urine appear unremarkable.  The patient does not appear to benefit from inpatient admission will prescribe a additional week of antibiotics outpatient and to follow closely with PCP.

## 2024-08-19 NOTE — ED ADULT NURSE NOTE - NSFALLHARMRISKINTERV_ED_ALL_ED

## 2024-08-19 NOTE — ED ADULT NURSE NOTE - TEMPLATE
[FreeTextEntry1] : 32 year old woman  currently 26.2 weeks pregnant  (DELPHINE 3/19/24) who comes in for evaluation GHTN She states she had no issues with first pregnancy, with second pregnancy- at the end-post partum had elevated BP but no meds and normalized quickly, 3rd pregnancy () delivered at 37  weeks- GHTN- put on Nifedipine 30 mg daily; took meds for about 1 month Between pregnancies normotensive Checks at home and had 2 spikes the weeks of  but otherwise really controlled On  mg daily  General

## 2024-08-19 NOTE — ED PROVIDER NOTE - PATIENT PORTAL LINK FT
You can access the FollowMyHealth Patient Portal offered by [facility] by registering at the following website: [email or web address]. By joining [employer]’s FollowMyHealth portal, you will also be able to view your health information using other applications (apps) compatible with our system.

## 2024-08-19 NOTE — ED ADULT NURSE NOTE - BOWEL SOUNDS LLQ
Problem: SLP  Goal: SLP Goal  Description:   LTG: Tolerate least restrictive PO diet with no clinical signs/sx aspiration  STGs:  1.  Complete base of tongue and laryngeal strengthening exercises with minimal cues  2.  Tolerate thermal stimulation to the anterior faucial pillars with 100% effortful swallow responses and delay less than 2 seconds.  3.  Pt will tolerate 2 oz of ice chips by spoon with no clinical signs/sx aspiration.     LTG: complete complex cognitive tasks modified independent  STGs:  1.  Delayed memory tasks with visual cues  2.  Money management tasks with supervision  3.  Medication management tasks with supervision    Outcome: Progressing      present

## 2024-08-19 NOTE — ED ADULT TRIAGE NOTE - CHIEF COMPLAINT QUOTE
Pt BIBA for generalized body weakness. Pt has history of COPD on 2L n/c at home as per EMS, pt was 80% on room air, also has history A-fib and right breast cancer pt c/o funny feeling in her chest

## 2024-08-20 LAB
CULTURE RESULTS: SIGNIFICANT CHANGE UP
SPECIMEN SOURCE: SIGNIFICANT CHANGE UP

## 2024-09-24 ENCOUNTER — INPATIENT (INPATIENT)
Facility: HOSPITAL | Age: 89
LOS: 6 days | Discharge: HOME | DRG: 193 | End: 2024-10-01
Attending: INTERNAL MEDICINE | Admitting: INTERNAL MEDICINE
Payer: MEDICARE

## 2024-09-24 VITALS
DIASTOLIC BLOOD PRESSURE: 56 MMHG | OXYGEN SATURATION: 90 % | HEART RATE: 88 BPM | SYSTOLIC BLOOD PRESSURE: 122 MMHG | WEIGHT: 240.3 LBS | RESPIRATION RATE: 25 BRPM

## 2024-09-24 DIAGNOSIS — E78.5 HYPERLIPIDEMIA, UNSPECIFIED: ICD-10-CM

## 2024-09-24 DIAGNOSIS — I10 ESSENTIAL (PRIMARY) HYPERTENSION: ICD-10-CM

## 2024-09-24 DIAGNOSIS — J96.92 RESPIRATORY FAILURE, UNSPECIFIED WITH HYPERCAPNIA: ICD-10-CM

## 2024-09-24 DIAGNOSIS — J18.9 PNEUMONIA, UNSPECIFIED ORGANISM: ICD-10-CM

## 2024-09-24 DIAGNOSIS — J44.1 CHRONIC OBSTRUCTIVE PULMONARY DISEASE WITH (ACUTE) EXACERBATION: ICD-10-CM

## 2024-09-24 DIAGNOSIS — Z29.9 ENCOUNTER FOR PROPHYLACTIC MEASURES, UNSPECIFIED: ICD-10-CM

## 2024-09-24 DIAGNOSIS — N39.0 URINARY TRACT INFECTION, SITE NOT SPECIFIED: ICD-10-CM

## 2024-09-24 DIAGNOSIS — J96.01 ACUTE RESPIRATORY FAILURE WITH HYPOXIA: ICD-10-CM

## 2024-09-24 DIAGNOSIS — I50.9 HEART FAILURE, UNSPECIFIED: ICD-10-CM

## 2024-09-24 LAB
ALBUMIN SERPL ELPH-MCNC: 3.5 G/DL — SIGNIFICANT CHANGE UP (ref 3.5–5)
ALP SERPL-CCNC: 155 U/L — HIGH (ref 40–120)
ALT FLD-CCNC: 16 U/L DA — SIGNIFICANT CHANGE UP (ref 10–60)
ANION GAP SERPL CALC-SCNC: 1 MMOL/L — LOW (ref 5–17)
APPEARANCE UR: ABNORMAL
APTT BLD: 41.7 SEC — HIGH (ref 24.5–35.6)
AST SERPL-CCNC: 19 U/L — SIGNIFICANT CHANGE UP (ref 10–40)
BASE EXCESS BLDA CALC-SCNC: 7.9 MMOL/L — HIGH (ref -2–3)
BASE EXCESS BLDA CALC-SCNC: 8 MMOL/L — HIGH (ref -2–3)
BASOPHILS # BLD AUTO: 0.04 K/UL — SIGNIFICANT CHANGE UP (ref 0–0.2)
BASOPHILS NFR BLD AUTO: 0.4 % — SIGNIFICANT CHANGE UP (ref 0–2)
BILIRUB SERPL-MCNC: 0.8 MG/DL — SIGNIFICANT CHANGE UP (ref 0.2–1.2)
BILIRUB UR-MCNC: NEGATIVE — SIGNIFICANT CHANGE UP
BLOOD GAS COMMENTS ARTERIAL: SIGNIFICANT CHANGE UP
BLOOD GAS COMMENTS ARTERIAL: SIGNIFICANT CHANGE UP
BUN SERPL-MCNC: 12 MG/DL — SIGNIFICANT CHANGE UP (ref 7–18)
CALCIUM SERPL-MCNC: 8.7 MG/DL — SIGNIFICANT CHANGE UP (ref 8.4–10.5)
CHLORIDE SERPL-SCNC: 100 MMOL/L — SIGNIFICANT CHANGE UP (ref 96–108)
CK MB CFR SERPL CALC: <1 NG/ML — SIGNIFICANT CHANGE UP (ref 0–3.6)
CO2 SERPL-SCNC: 36 MMOL/L — HIGH (ref 22–31)
COLOR SPEC: YELLOW — SIGNIFICANT CHANGE UP
CREAT SERPL-MCNC: 0.9 MG/DL — SIGNIFICANT CHANGE UP (ref 0.5–1.3)
DIFF PNL FLD: ABNORMAL
EGFR: 60 ML/MIN/1.73M2 — SIGNIFICANT CHANGE UP
EOSINOPHIL # BLD AUTO: 0.63 K/UL — HIGH (ref 0–0.5)
EOSINOPHIL NFR BLD AUTO: 7 % — HIGH (ref 0–6)
FLUAV AG NPH QL: SIGNIFICANT CHANGE UP
FLUBV AG NPH QL: SIGNIFICANT CHANGE UP
GLUCOSE SERPL-MCNC: 164 MG/DL — HIGH (ref 70–99)
GLUCOSE UR QL: NEGATIVE MG/DL — SIGNIFICANT CHANGE UP
HCO3 BLDA-SCNC: 38 MMOL/L — HIGH (ref 21–28)
HCO3 BLDA-SCNC: 38 MMOL/L — HIGH (ref 21–28)
HCT VFR BLD CALC: 44.8 % — SIGNIFICANT CHANGE UP (ref 34.5–45)
HGB BLD-MCNC: 13.5 G/DL — SIGNIFICANT CHANGE UP (ref 11.5–15.5)
HOROWITZ INDEX BLDA+IHG-RTO: 30 — SIGNIFICANT CHANGE UP
HOROWITZ INDEX BLDA+IHG-RTO: 60 — SIGNIFICANT CHANGE UP
IMM GRANULOCYTES NFR BLD AUTO: 0.3 % — SIGNIFICANT CHANGE UP (ref 0–0.9)
INR BLD: 1.86 RATIO — HIGH (ref 0.85–1.16)
KETONES UR-MCNC: NEGATIVE MG/DL — SIGNIFICANT CHANGE UP
LACTATE SERPL-SCNC: 1.8 MMOL/L — SIGNIFICANT CHANGE UP (ref 0.7–2)
LEUKOCYTE ESTERASE UR-ACNC: ABNORMAL
LYMPHOCYTES # BLD AUTO: 1.1 K/UL — SIGNIFICANT CHANGE UP (ref 1–3.3)
LYMPHOCYTES # BLD AUTO: 12.3 % — LOW (ref 13–44)
MAGNESIUM SERPL-MCNC: 1.8 MG/DL — SIGNIFICANT CHANGE UP (ref 1.6–2.6)
MCHC RBC-ENTMCNC: 30.1 GM/DL — LOW (ref 32–36)
MCHC RBC-ENTMCNC: 30.3 PG — SIGNIFICANT CHANGE UP (ref 27–34)
MCV RBC AUTO: 100.4 FL — HIGH (ref 80–100)
MONOCYTES # BLD AUTO: 0.12 K/UL — SIGNIFICANT CHANGE UP (ref 0–0.9)
MONOCYTES NFR BLD AUTO: 1.3 % — LOW (ref 2–14)
NEUTROPHILS # BLD AUTO: 7.03 K/UL — SIGNIFICANT CHANGE UP (ref 1.8–7.4)
NEUTROPHILS NFR BLD AUTO: 78.7 % — HIGH (ref 43–77)
NITRITE UR-MCNC: NEGATIVE — SIGNIFICANT CHANGE UP
NRBC # BLD: 0 /100 WBCS — SIGNIFICANT CHANGE UP (ref 0–0)
NT-PROBNP SERPL-SCNC: 1147 PG/ML — HIGH (ref 0–450)
PCO2 BLDA: 80 MMHG — CRITICAL HIGH (ref 32–35)
PCO2 BLDA: 84 MMHG — CRITICAL HIGH (ref 32–35)
PH BLDA: 7.26 — LOW (ref 7.35–7.45)
PH BLDA: 7.28 — LOW (ref 7.35–7.45)
PH UR: 5 — SIGNIFICANT CHANGE UP (ref 5–8)
PLATELET # BLD AUTO: 262 K/UL — SIGNIFICANT CHANGE UP (ref 150–400)
PO2 BLDA: 122 MMHG — HIGH (ref 83–108)
PO2 BLDA: 72 MMHG — LOW (ref 83–108)
POTASSIUM SERPL-MCNC: 3.9 MMOL/L — SIGNIFICANT CHANGE UP (ref 3.5–5.3)
POTASSIUM SERPL-SCNC: 3.9 MMOL/L — SIGNIFICANT CHANGE UP (ref 3.5–5.3)
PROT SERPL-MCNC: 8.4 G/DL — HIGH (ref 6–8.3)
PROT UR-MCNC: 30 MG/DL
PROTHROM AB SERPL-ACNC: 21.6 SEC — HIGH (ref 9.9–13.4)
RBC # BLD: 4.46 M/UL — SIGNIFICANT CHANGE UP (ref 3.8–5.2)
RBC # FLD: 14.7 % — HIGH (ref 10.3–14.5)
SAO2 % BLDA: 94 % — SIGNIFICANT CHANGE UP
SAO2 % BLDA: 98 % — SIGNIFICANT CHANGE UP
SARS-COV-2 RNA SPEC QL NAA+PROBE: SIGNIFICANT CHANGE UP
SODIUM SERPL-SCNC: 137 MMOL/L — SIGNIFICANT CHANGE UP (ref 135–145)
SP GR SPEC: 1.01 — SIGNIFICANT CHANGE UP (ref 1–1.03)
TROPONIN I, HIGH SENSITIVITY RESULT: 46.6 NG/L — SIGNIFICANT CHANGE UP
UROBILINOGEN FLD QL: 1 MG/DL — SIGNIFICANT CHANGE UP (ref 0.2–1)
WBC # BLD: 8.95 K/UL — SIGNIFICANT CHANGE UP (ref 3.8–10.5)
WBC # FLD AUTO: 8.95 K/UL — SIGNIFICANT CHANGE UP (ref 3.8–10.5)

## 2024-09-24 PROCEDURE — 99223 1ST HOSP IP/OBS HIGH 75: CPT | Mod: GC

## 2024-09-24 PROCEDURE — 99291 CRITICAL CARE FIRST HOUR: CPT

## 2024-09-24 PROCEDURE — 71045 X-RAY EXAM CHEST 1 VIEW: CPT | Mod: 26

## 2024-09-24 RX ORDER — METHYLPREDNISOLONE ACETATE 80 MG/ML
40 INJECTION, SUSPENSION INTRA-ARTICULAR; INTRALESIONAL; INTRAMUSCULAR; SOFT TISSUE DAILY
Refills: 0 | Status: DISCONTINUED | OUTPATIENT
Start: 2024-09-24 | End: 2024-09-25

## 2024-09-24 RX ORDER — AZITHROMYCIN 250 MG/1
500 TABLET, FILM COATED ORAL EVERY 24 HOURS
Refills: 0 | Status: DISCONTINUED | OUTPATIENT
Start: 2024-09-25 | End: 2024-09-28

## 2024-09-24 RX ORDER — ENOXAPARIN SODIUM 150 MG/ML
40 INJECTION SUBCUTANEOUS EVERY 24 HOURS
Refills: 0 | Status: DISCONTINUED | OUTPATIENT
Start: 2024-09-24 | End: 2024-09-25

## 2024-09-24 RX ORDER — ALBUTEROL 90 MCG
2 AEROSOL (GRAM) INHALATION EVERY 6 HOURS
Refills: 0 | Status: DISCONTINUED | OUTPATIENT
Start: 2024-09-24 | End: 2024-10-01

## 2024-09-24 RX ORDER — CEFTRIAXONE SODIUM 1 G
1000 VIAL (EA) INJECTION ONCE
Refills: 0 | Status: COMPLETED | OUTPATIENT
Start: 2024-09-24 | End: 2024-09-24

## 2024-09-24 RX ORDER — IPRATROPIUM BROMIDE AND ALBUTEROL SULFATE .5; 3 MG/3ML; MG/3ML
3 SOLUTION RESPIRATORY (INHALATION) EVERY 6 HOURS
Refills: 0 | Status: DISCONTINUED | OUTPATIENT
Start: 2024-09-24 | End: 2024-10-01

## 2024-09-24 RX ORDER — FLUTICASONE PROPION/SALMETEROL 100-50 MCG
1 BLISTER, WITH INHALATION DEVICE INHALATION
Refills: 0 | Status: DISCONTINUED | OUTPATIENT
Start: 2024-09-24 | End: 2024-10-01

## 2024-09-24 RX ORDER — FUROSEMIDE 10 MG/ML
60 INJECTION INTRAVENOUS ONCE
Refills: 0 | Status: COMPLETED | OUTPATIENT
Start: 2024-09-24 | End: 2024-09-24

## 2024-09-24 RX ORDER — AZITHROMYCIN 250 MG/1
500 TABLET, FILM COATED ORAL ONCE
Refills: 0 | Status: COMPLETED | OUTPATIENT
Start: 2024-09-24 | End: 2024-09-24

## 2024-09-24 RX ORDER — CEFTRIAXONE SODIUM 1 G
1000 VIAL (EA) INJECTION EVERY 24 HOURS
Refills: 0 | Status: COMPLETED | OUTPATIENT
Start: 2024-09-25 | End: 2024-09-29

## 2024-09-24 RX ADMIN — AZITHROMYCIN 255 MILLIGRAM(S): 250 TABLET, FILM COATED ORAL at 19:34

## 2024-09-24 RX ADMIN — FUROSEMIDE 60 MILLIGRAM(S): 10 INJECTION INTRAVENOUS at 19:31

## 2024-09-24 RX ADMIN — Medication 1000 MILLIGRAM(S): at 19:15

## 2024-09-24 RX ADMIN — AZITHROMYCIN 500 MILLIGRAM(S): 250 TABLET, FILM COATED ORAL at 20:34

## 2024-09-24 RX ADMIN — Medication 100 MILLIGRAM(S): at 18:43

## 2024-09-24 NOTE — CONSULT NOTE ADULT - ATTENDING COMMENTS
89 y/o F with h/o COPD, MARGARITA (on CPAP at night), afib on eliquis, HFpEF (G2DD), presenting with acute onset SOB, reportedly hypoxic to 50s on EMS arrival, with diffuse crackles, placed on CPAP, given SL nitroglycerin. In ED patient found to be hypercapnic, mild resp acidosis off NIV. CXR with possible RUL infiltrate, pulm venous congestion. At the time of assessment patient seen off bipap, conversing with family member at bedside, no evidence of distress.     Assessment:  - Acute on chronic hypoxic, hypercapnic respiratory failure  - HFpEF  - MARGARITA  - COPD without cardinal signs of exacerbation    Plan:  - lasix 40mg IV   - place on bipap overnight   - maintain O2 sat 88-95%  - hold off on steroids for now  - low suspicion for PNA, sudden onset with no productive cough, fevers, leukocytosis  - resp PCR panel  - repeat abg on bipap    Please call ICU if evidence of deterioration

## 2024-09-24 NOTE — H&P ADULT - PROBLEM SELECTOR PLAN 3
p/w sob and found to have hypercapnic respiratory failure   CXR shows: Right upper lobe infiltrate concerning for pneumonia. Prominent cardiac silhouette with pulmonary venous engorgement but   without malcom pulmonary edema. Mild subsegmental atelectasis versus scarring in the left lower lobe.  s/p Rocephin and azithromycin   rvp neg  c/w  azithromycin 500mg qd + rocephin 1g qd  f/u  strep ag, legionella, mycoplasma igm  tylenol prn  monitor sx

## 2024-09-24 NOTE — CONSULT NOTE ADULT - CONVERSATION DETAILS
They say pt said to "bring her back." Attempted to explain the logistics of CPR and intubation, they said "go easy on the compressions" and want full code.    Full code

## 2024-09-24 NOTE — H&P ADULT - ASSESSMENT
89 yo F, with morbid obesity, COPD (home O2 2L, noncompliant with cpap, multiple hospitalizations at [facility] for COPD exacerbation under MRN [id number]), HFpEF (EF 55-60%), severe RV HTN, and HLD is coming in with SOB. AB.28/pco2 80/po2 122/ Hco3 38/ base excess is 7.9. chest xray: Right upper lobe infiltrate concerning for pneumonia. Prominent cardiac silhouette with pulmonary venous engorgement but without mlacom pulmonary edema. Mild subsegmental atelectasis versus scarring in the left lower lobe  Patient is being admitted  for  hypercapnic respiratory failure likely 2/2 COPD exacerbation provoked by PNA.    NEEDS MED REC

## 2024-09-24 NOTE — ED PROVIDER NOTE - PROGRESS NOTE DETAILS
Labs grossly unremarkable, with elevated BNP.  Chest x-ray with possible right-sided pneumonia as well as CHF.  Given antibiotics and Lasix.  Patient was placed on a BiPAP and VBG consistent with a respiratory acidosis that is somewhat compensated.  ICU was consulted given BiPAP requirement and initially advised that given patient looked improved to take off of the BiPAP and repeat the blood gas.  Repeat blood gas still with acidosis.  Patient placed back on BiPAP.  As per ICU patient needs to be on BiPAP overnight but does not require ICU level of care at this time.  Will admit to telemetry floor.

## 2024-09-24 NOTE — CONSULT NOTE ADULT - SUBJECTIVE AND OBJECTIVE BOX
HPI:  Pt is a 89 yo F from home, ambulates with walker, with morbid obesity, COPD (home O2 2L, noncompliant with cpap, multiple hospitalizations at [facility] for COPD exacerbation under MRN [id number]), HFpEF (EF 55-60%), severe RV HTN, DM, AF on eliquis, presenting with complaint of lethargy. Collateral info obtained from granddaughter and daughter Sylvia at bedside. Sylvia lives with the patient. Say that pt was in her usoh, had a drink of kenyon and was feeling well, until about 3pm on day of presentation when she started saying she was feeling unwell. They called EMS, she was put on CPAP and given 2 doses of SL nitro. In the ED pt was lethargic, encephalopathic, minimally conversant. As per family this is not her baseline. Family admits that pt has not been on her home cpap in a "very long time."     PAST MEDICAL & SURGICAL HISTORY:      SOCIAL HX:   Smoking  deny                       ETOH     occasional, 1 drink max at at [facility]                       Other    FAMILY HISTORY:  :  No known cardiovascular family history     ROS:  See HPI     Allergies    Allergy Status Unknown    Intolerances          PHYSICAL EXAM    ICU Vital Signs Last 24 Hrs  T(C): 36.8 (24 Sep 2024 21:05), Max: 36.8 (24 Sep 2024 21:05)  T(F): 98.3 (24 Sep 2024 21:05), Max: 98.3 (24 Sep 2024 21:05)  HR: 69 (24 Sep 2024 21:05) (69 - 88)  BP: 90/67 (24 Sep 2024 21:05) (90/67 - 122/56)  BP(mean): 76 (24 Sep 2024 21:05) (76 - 76)  ABP: --  ABP(mean): --  RR: 20 (24 Sep 2024 21:05) (20 - 25)  SpO2: 98% (24 Sep 2024 21:05) (90% - 99%)    O2 Parameters below as of 24 Sep 2024 21:05  Patient On (Oxygen Delivery Method): BiPAP/CPAP            General: labored breathing on NC, appeared more comfortable on bipap  HEENT:  , no scleral icterus              Lymphatic system: No cervical LAD  Lungs: difficult to auscultate 2/2 body habitus, poor iar mvmt, no crackles appreciated  Cardiovascular: RRR  Gastrointestinal: Soft, Positive BS, nontender, obese  Musculoskeletal:  Moves all extremities.    Skin: Warm  Neurological: AOx0  : no suprapubic tenderness        LABS:                          13.5   8.95  )-----------( 262      ( 24 Sep 2024 17:00 )             44.8                                               09-24    137  |  100  |  12  ----------------------------<  164[H]  3.9   |  36[H]  |  0.90    Ca    8.7      24 Sep 2024 17:00  Mg     1.8     09-24    TPro  8.4[H]  /  Alb  3.5  /  TBili  0.8  /  DBili  x   /  AST  19  /  ALT  16  /  AlkPhos  155[H]  09-24      PT/INR - ( 24 Sep 2024 17:00 )   PT: 21.6 sec;   INR: 1.86 ratio         PTT - ( 24 Sep 2024 17:00 )  PTT:41.7 sec                                       Urinalysis Basic - ( 24 Sep 2024 17:00 )    Color: x / Appearance: x / SG: x / pH: x  Gluc: 164 mg/dL / Ketone: x  / Bili: x / Urobili: x   Blood: x / Protein: x / Nitrite: x   Leuk Esterase: x / RBC: x / WBC x   Sq Epi: x / Non Sq Epi: x / Bacteria: x        CARDIAC MARKERS ( 24 Sep 2024 17:00 )  x     / x     / x     / x     / <1.0 ng/mL                                            LIVER FUNCTIONS - ( 24 Sep 2024 17:00 )  Alb: 3.5 g/dL / Pro: 8.4 g/dL / ALK PHOS: 155 U/L / ALT: 16 U/L DA / AST: 19 U/L / GGT: x                                                                                                                                   ABG - ( 24 Sep 2024 19:54 )  pH, Arterial: 7.26  pH, Blood: x     /  pCO2: 84    /  pO2: 72    / HCO3: 38    / Base Excess: 8.0   /  SaO2: 94                  CXR:    ECHO:    MEDICATIONS  (STANDING):    MEDICATIONS  (PRN):

## 2024-09-24 NOTE — H&P ADULT - HISTORY OF PRESENT ILLNESS
Pt is a 89 yo F from home, AAOx0 , HHA 7 hours a day  5 days a week, ambulates with walker, with morbid obesity, COPD (home O2 2L, noncompliant with cpap, multiple hospitalizations at [facility] for COPD exacerbation under MRN [id number]), HFpEF (EF 55-60%), severe RV HTN and HLD  presenting with complaint of lethargy. Collateral info obtained from granddaughter and daughter Sylvia at bedside. Sylvia lives with the patient. Say that pt was in her usual state of health , had a drink of kenyon and was feeling well, until about 3pm on day of presentation when she started saying she was feeling unwell. They took her oxygen at home and it was 50s and then went back up to 80s They called EMS, she was put on CPAP and given 2 doses of SL nitro. In the ED pt was lethargic, encephalopathic, minimally conversant. As per family this is not her baseline. Family admits that pt has not been on her home cpap in a "very long time."       ED course:  vitals: HR 88, /56 RR 25 saturating 95 on Bipap   pro-bnp 1147   AB.28/pco2 80/po2 122/ Hco3 38/ base excess is 7.9   UA+  chest xray: Right upper lobe infiltrate concerning for pneumonia. Prominent cardiac silhouette with pulmonary venous engorgement but   without malcom pulmonary edema. Mild subsegmental atelectasis versus scarring in the left lower lobe.  s/p ctx and azithromycin  s/p 60 mg lasix

## 2024-09-24 NOTE — H&P ADULT - PROBLEM SELECTOR PLAN 2
hx of COPD, on 2L of oxygen at home   p/w sob,   CXR shows: Right upper lobe infiltrate concerning for pneumonia. Prominent cardiac silhouette with pulmonary venous engorgement but   without malcom pulmonary edema. Mild subsegmental atelectasis versus scarring in the left lower lobe  -O2 support as needed, maintain O2 sat 88-92%, avoid over oxygenation  - c/w home CPAP   -solumedrol 40q8, taper down  -duoneb, symbicort, albuterol prn  -azithromycin 500qd  -Incentive spirometer  -can consider Pulm consult

## 2024-09-24 NOTE — H&P ADULT - NSICDXPASTMEDICALHX_GEN_ALL_CORE_FT
PAST MEDICAL HISTORY:  CHF, acute     COPD exacerbation     HLD (hyperlipidemia)     HTN (hypertension)

## 2024-09-24 NOTE — ED PROVIDER NOTE - OBJECTIVE STATEMENT
89-year-old female with a past medical history of A-fib, COPD, CHF, hypertension, DVT, sleep apnea on nighttime CPAP (not compliant) presents with acute onset of shortness of breath.  As per the patient's family patient woke up and was well until around 3 PM when she became suddenly short of breath.  EMS was called and were concerned about acute pulmonary edema given crackles on lung exam and the patient was given 2 sublingual nitroglycerin and placed on a CPAP and brought to the ED.  As per family patient has had some sneezing and may be a mild cough but denies fevers.

## 2024-09-24 NOTE — H&P ADULT - CONVERSATION DETAILS
An extensive goals of care conversation was held including options, risks and benefits of many medical treatments including CPR, intubation, and tube feeding. Pt/family chose for pt to be FULL CODE.

## 2024-09-24 NOTE — H&P ADULT - PROBLEM SELECTOR PLAN 1
p/w SOB , lethargy requiring BIPAP ( however patient always uses this at night).   vitals: HR 88, /56 RR 25 saturating 95 on Bipap   pro-bnp 1147   AB.28/pco2 80/po2 122/ Hco3 38/ base excess is 7.9   UA+  chest xray: Right upper lobe infiltrate concerning for pneumonia. Prominent cardiac silhouette with pulmonary venous engorgement but   without malcom pulmonary edema. Mild subsegmental atelectasis versus scarring in the left lower lobe.  Will transition to NC in AM.   likely 2/2 to COPD exacerbation  provoked by PNA. However can not rule out CHF exacerbation.  Follow plan as below.   ICU consulted and patient was cleared for the floors.   Can consider pulm consult.

## 2024-09-24 NOTE — ED ADULT NURSE NOTE - NSFALLHARMRISKINTERV_ED_ALL_ED

## 2024-09-24 NOTE — H&P ADULT - ATTENDING COMMENTS
IMAGING  Chest X-Ray  IMPRESSION:  1. Right upper lobe infiltrate concerning for pneumonia.  2. Prominent cardiac silhouette with pulmonary venous engorgement but without malcom pulmonary edema.  3. Mild subsegmental atelectasis versus scarring in the left lower lobe.    EKG  Atrial Fibrillation, 74 bpm, QTc 470ms, on my read no ST segment elevation or depression, TWI in leads III, V3    HPI  89 year old female patient with pmhx legally blind, HTN, MARGARITA on CPAP (intermittently compliant), COPD on 2 lpm home O2 NC, Afib on Eliquis, HFpEF, Breast CA, and remote h/o LLE DVT who presented to the ER due to increased work of breathing and feeling unwell.  The patient at 3pm started to feel unwell and had increased work of breathing. She has been sneezing more frequently recently. The patient uses CPAP at home (family does not know the CPAP setting) and is intermittently compliant with it, taking it off at night when she feels it is too uncomfortable. The family called EMS who gave 2 sublingual nitroglycerin as patient had crackles on exam and placed patient on CPAP. In the ER patient lethargic and pCO2 of 80. Patient placed on BiPAP and ICU consulted, who felt patient is stable for Tele.    Review Of Systems (from family; patient unable to answer questions) included: + sneezing, + feeling unwell, + increased work of breathing, no fever, no cough, no diarrhea, no constipation, no loss of appetite, no dysphagia (patient eats regular food at home), no dysuria, no sick contacts, no recent travel    Physical Exam  General: Lethargic, Not answering questions, mild JVD  Cardiac: Irregular rhythm, regular rate  Pulmonary: Crackles at RUL  Abdominal: Soft, ND, NT  Extremities: Trace edema b/l    A/P  # Acute Encephalopathy  # Hypercapnic Respiratory Failure  # COPD Exacerbation  # Possible RUL CAP PNA  > Patient is A&Ox3 at baseline; now lethargic and not answering questions at bedside in the ER  > BNP of only 1,147; given age of 89 years old the BNP is likely negative; CHF exacerbation appears unlikely  > ICU evaluated patient; patient is stable for Tele per ICU evaluation  - IV Ceftriaxone  - IV Azithromycin  - Check Legionella/Strep Urine Antigen  - Check Sputum Culture  - Check Mycoplasma IgM Antigen  - Symbicort  - DuoNebs  - IV SoluMedrol 40mg  - BiPAP; settings increased to 16/6 given lethargy in setting of elevated CO2 on ABG  - Follow up repeat ABG    # UTI  - IV Ceftriaxone  - Follow up urine culture    # Hx of HTN, MARGARITA, COPD on 2 lpm home O2 NC, Afib, HFpEF, Breast CA, DVT  - Continue home medications after med-rec  - Can do IV Lasix 40mg daily  - Monitor Intake/Outputs, Daily Weights  - Fluid and Salt Restriction    # DVT PPx  - Eliquis    # FEN  - NPO until more awake (No chicken, No eggs, Low fat diet)  - Monitor and replete electrolytes as needed  - Lasix   - Aspiration Precautions  - Fall Precautions    FULL CODE    Previous Admissions Included  6/3/2023 - 6/6/2023: Acute on chronic diastolic congestive heart failure. echo shows HFpEF, grade 2 DD.  1/30/2023 - 2/20/2023: AHRF 2/2 Acute diastolic CHF +/- COPD exacerbation and UTI  10/10/2022 - 10/20/2022: AHRF likely 2/2 CHF  exacerbation in the setting of medication noncompliance & COVID infection  7/28/2022 - 8/4/2022: Acute hypoxic respiratory failure likely in the setting of CHF exacerbation  11/14/2021 - 11/18/2021: Acute bronchospasm. Pt was found to have a pulmonary nodule on CT. The Nodule has increased in size as per the read. Pulmonary aware and stated no intervention at this point. f/u Outpt  4/13/2020 - 4/20/2020: left lower extremity deep venous thrombosis. Wheelchair bound at baseline. No need for rehab as patient is at baseline.    Patient case and management was discussed with ER Attending. ER attempted to call Dr. Bianchi, but did not answer. Will admit and patient will go to Dr. Bianchi's service in the morning.  I did examine all labs, imaging, prior notes IMAGING  Chest X-Ray  IMPRESSION:  1. Right upper lobe infiltrate concerning for pneumonia.  2. Prominent cardiac silhouette with pulmonary venous engorgement but without malcom pulmonary edema.  3. Mild subsegmental atelectasis versus scarring in the left lower lobe.    EKG  Atrial Fibrillation, 74 bpm, QTc 470ms, on my read no ST segment elevation or depression, TWI in leads III, V3    HPI  89 year old female patient with pmhx legally blind, HTN, MARGARITA on CPAP (intermittently compliant), COPD on 2 lpm home O2 NC, Afib on Eliquis, HFpEF, Breast CA, and remote h/o LLE DVT who presented to the ER due to increased work of breathing and feeling unwell.  The patient at 3pm started to feel unwell and had increased work of breathing. She has been sneezing more frequently recently. The patient uses CPAP at home (family does not know the CPAP setting) and is intermittently compliant with it, taking it off at night when she feels it is too uncomfortable. The family called EMS who gave 2 sublingual nitroglycerin as patient had crackles on exam and placed patient on CPAP. In the ER patient lethargic and pCO2 of 80. Patient placed on BiPAP and ICU consulted, who felt patient is stable for Tele.    Review Of Systems (from family; patient unable to answer questions) included: + sneezing, + feeling unwell, + increased work of breathing, no fever, no cough, no diarrhea, no constipation, no loss of appetite, no dysphagia (patient eats regular food at home), no dysuria, no sick contacts, no recent travel    Physical Exam  General: Lethargic, Not answering questions, mild JVD  Cardiac: Irregular rhythm, regular rate  Pulmonary: Crackles at RUL  Abdominal: Soft, ND, NT  Extremities: Trace edema b/l    A/P  # Acute Encephalopathy  # Hypercapnic Respiratory Failure  # COPD Exacerbation  # Possible RUL CAP PNA  > Patient is A&Ox3 at baseline; now lethargic and not answering questions at bedside in the ER  > BNP of only 1,147; given age of 89 years old the BNP is likely negative; CHF exacerbation appears unlikely  > ICU evaluated patient; patient is stable for Tele per ICU evaluation  - IV Ceftriaxone  - IV Azithromycin  - Check Legionella/Strep Urine Antigen  - Check Sputum Culture  - Check Mycoplasma IgM Antigen  - Symbicort  - DuoNebs  - IV SoluMedrol 40mg  - BiPAP; settings increased to 16/6 given lethargy in setting of elevated CO2 on ABG  - Follow up repeat ABG    # UTI  - IV Ceftriaxone  - Follow up urine culture    # Hx of HTN, MARGARITA, COPD on 2 lpm home O2 NC, Afib, HFpEF, Breast CA, DVT  - Continue home medications after med-rec  - Monitor Intake/Outputs, Daily Weights  - Fluid and Salt Restriction    # DVT PPx  - Eliquis    # FEN  - NPO until more awake (No chicken, No eggs, Low fat diet)  - Monitor and replete electrolytes as needed  - Lasix   - Aspiration Precautions  - Fall Precautions    FULL CODE    Previous Admissions Included  6/3/2023 - 6/6/2023: Acute on chronic diastolic congestive heart failure. echo shows HFpEF, grade 2 DD.  1/30/2023 - 2/20/2023: AHRF 2/2 Acute diastolic CHF +/- COPD exacerbation and UTI  10/10/2022 - 10/20/2022: AHRF likely 2/2 CHF  exacerbation in the setting of medication noncompliance & COVID infection  7/28/2022 - 8/4/2022: Acute hypoxic respiratory failure likely in the setting of CHF exacerbation  11/14/2021 - 11/18/2021: Acute bronchospasm. Pt was found to have a pulmonary nodule on CT. The Nodule has increased in size as per the read. Pulmonary aware and stated no intervention at this point. f/u Outpt  4/13/2020 - 4/20/2020: left lower extremity deep venous thrombosis. Wheelchair bound at baseline. No need for rehab as patient is at baseline.    Patient case and management was discussed with ER Attending. ER attempted to call Dr. Bianchi, but did not answer. Will admit and patient will go to Dr. Bianchi's service in the morning.  I did examine all labs, imaging, prior notes

## 2024-09-24 NOTE — H&P ADULT - NSHPPHYSICALEXAM_GEN_ALL_CORE
T(C): 36.8 (09-24-24 @ 21:05), Max: 36.8 (09-24-24 @ 21:05)  HR: 61 (09-24-24 @ 23:05) (61 - 88)  BP: 104/64 (09-24-24 @ 23:05) (90/67 - 122/56)  RR: 20 (09-24-24 @ 23:05) (20 - 25)  SpO2: 98% (09-24-24 @ 23:05) (90% - 99%)    General: labored breathing on NC, appeared more comfortable on bipap  HEENT:  , no scleral icterus              Lymphatic system: No cervical LAD  Lungs: difficult to auscultate 2/2 body habitus, poor iar mvmt, no crackles appreciated  Cardiovascular: RRR  Gastrointestinal: Soft, Positive BS, nontender, obese  Musculoskeletal:  Moves all extremities.    Skin: Warm  Neurological: AOx0  : no suprapubic tenderness

## 2024-09-24 NOTE — CONSULT NOTE ADULT - ASSESSMENT
89 yo F, with morbid obesity, COPD (home O2 2L, noncompliant with cpap, multiple hospitalizations at [facility] for COPD exacerbation under MRN [id number]), HFpEF (EF 55-60%), severe RV HTN, DM, AF on eliquis, with hypercapnic respiratory failure.    #hypercapnic respiratory failure  -pt needs bipap, family has been noncompliant with it  -CXR wet read -  showing some b/l infiltrates, cardiomegaly. Lower suspicion for PNA at this time (no fevers, no WBC), could be fluid congestion  -please continue bipap overnight as her outpatient prescription is meant to be, and reassess in a.m.  -current settings 6/6/16/60%, pulling good volumes, tolerating well  -serial ABG, next one in 1 hour  -if her condition does not improve by a.m., please reconsult ICU      Stable for DG to floors, please reconsult if she does not improve after night of NIV 89 yo F, with morbid obesity, COPD (home O2 2L, noncompliant with cpap, multiple hospitalizations at [facility] for COPD exacerbation under MRN [id number]), HFpEF (EF 55-60%), severe RV HTN, DM, AF on eliquis, with hypercapnic respiratory failure.    #hypercapnic respiratory failure  -pt needs bipap, family has been noncompliant with it  -CXR wet read -  showing some b/l infiltrates, cardiomegaly. Lower suspicion for PNA at this time (no fevers, no WBC), could be fluid congestion  -please continue bipap overnight as her outpatient prescription is meant to be, and reassess in a.m.  -current settings 6/6/16/60%, pulling good volumes, tolerating well  -serial ABG, next one in 1 hour  -if her condition does not improve by a.m., please reconsult ICU      Stable for admission to floors, please reconsult if she does not improve after night of NIV 89 yo F, with morbid obesity, COPD (home O2 2L, noncompliant with cpap, multiple hospitalizations at [facility] for COPD exacerbation under MRN [id number]), HFpEF (EF 55-60%), severe RV HTN, DM, AF on eliquis, with hypercapnic respiratory failure.    #hypercapnic respiratory failure  #COPD  -COPD not meeting GOLD criteria for exacerbation  -pt needs bipap, family has been noncompliant with it  -pt appears to be chronically hypercarbic  -CXR wet read -  showing some b/l infiltrates, cardiomegaly. Lower suspicion for PNA at this time (no fevers, no WBC, URI ssx denied bty family), could be fluid congestion  -please continue bipap overnight as her outpatient prescription is meant to be, and reassess in a.m.  -current settings 6/6/16/60%, pulling good volumes, tolerating well  -target O2 sat 88-92%  -serial ABG, next one in 1 hour  -if her condition does not improve by a.m., please reconsult ICU      Stable for admission to floors, please reconsult if she does not improve after night of NIV

## 2024-09-24 NOTE — ED ADULT NURSE NOTE - OBJECTIVE STATEMENT
Patient BIBA as a notification for hypoxia saturation in 50 on room air , CPAP, as per daughter started 1 hour ago

## 2024-09-25 ENCOUNTER — RESULT REVIEW (OUTPATIENT)
Age: 89
End: 2024-09-25

## 2024-09-25 LAB
ALBUMIN SERPL ELPH-MCNC: 3 G/DL — LOW (ref 3.5–5)
ALP SERPL-CCNC: 119 U/L — SIGNIFICANT CHANGE UP (ref 40–120)
ALT FLD-CCNC: 14 U/L DA — SIGNIFICANT CHANGE UP (ref 10–60)
ANION GAP SERPL CALC-SCNC: 5 MMOL/L — SIGNIFICANT CHANGE UP (ref 5–17)
AST SERPL-CCNC: 15 U/L — SIGNIFICANT CHANGE UP (ref 10–40)
BASE EXCESS BLDA CALC-SCNC: 6.3 MMOL/L — HIGH (ref -2–3)
BILIRUB SERPL-MCNC: 1 MG/DL — SIGNIFICANT CHANGE UP (ref 0.2–1.2)
BLOOD GAS COMMENTS ARTERIAL: SIGNIFICANT CHANGE UP
BUN SERPL-MCNC: 17 MG/DL — SIGNIFICANT CHANGE UP (ref 7–18)
CALCIUM SERPL-MCNC: 8.3 MG/DL — LOW (ref 8.4–10.5)
CHLORIDE SERPL-SCNC: 99 MMOL/L — SIGNIFICANT CHANGE UP (ref 96–108)
CO2 SERPL-SCNC: 34 MMOL/L — HIGH (ref 22–31)
CREAT SERPL-MCNC: 1 MG/DL — SIGNIFICANT CHANGE UP (ref 0.5–1.3)
EGFR: 53 ML/MIN/1.73M2 — LOW
GLUCOSE SERPL-MCNC: 135 MG/DL — HIGH (ref 70–99)
HCO3 BLDA-SCNC: 36 MMOL/L — HIGH (ref 21–28)
HCT VFR BLD CALC: 38.9 % — SIGNIFICANT CHANGE UP (ref 34.5–45)
HGB BLD-MCNC: 11.8 G/DL — SIGNIFICANT CHANGE UP (ref 11.5–15.5)
HOROWITZ INDEX BLDA+IHG-RTO: 40 — SIGNIFICANT CHANGE UP
LEGIONELLA AG UR QL: NEGATIVE — SIGNIFICANT CHANGE UP
MAGNESIUM SERPL-MCNC: 1.8 MG/DL — SIGNIFICANT CHANGE UP (ref 1.6–2.6)
MCHC RBC-ENTMCNC: 30.3 GM/DL — LOW (ref 32–36)
MCHC RBC-ENTMCNC: 30.6 PG — SIGNIFICANT CHANGE UP (ref 27–34)
MCV RBC AUTO: 100.8 FL — HIGH (ref 80–100)
NRBC # BLD: 0 /100 WBCS — SIGNIFICANT CHANGE UP (ref 0–0)
PCO2 BLDA: 74 MMHG — CRITICAL HIGH (ref 32–35)
PH BLDA: 7.29 — LOW (ref 7.35–7.45)
PHOSPHATE SERPL-MCNC: 4.6 MG/DL — HIGH (ref 2.5–4.5)
PLATELET # BLD AUTO: 215 K/UL — SIGNIFICANT CHANGE UP (ref 150–400)
PO2 BLDA: 73 MMHG — LOW (ref 83–108)
POTASSIUM SERPL-MCNC: 4.2 MMOL/L — SIGNIFICANT CHANGE UP (ref 3.5–5.3)
POTASSIUM SERPL-SCNC: 4.2 MMOL/L — SIGNIFICANT CHANGE UP (ref 3.5–5.3)
PROT SERPL-MCNC: 7.3 G/DL — SIGNIFICANT CHANGE UP (ref 6–8.3)
RBC # BLD: 3.86 M/UL — SIGNIFICANT CHANGE UP (ref 3.8–5.2)
RBC # FLD: 14.7 % — HIGH (ref 10.3–14.5)
S PNEUM AG UR QL: NEGATIVE — SIGNIFICANT CHANGE UP
SAO2 % BLDA: 93 % — SIGNIFICANT CHANGE UP
SODIUM SERPL-SCNC: 138 MMOL/L — SIGNIFICANT CHANGE UP (ref 135–145)
WBC # BLD: 15.8 K/UL — HIGH (ref 3.8–10.5)
WBC # FLD AUTO: 15.8 K/UL — HIGH (ref 3.8–10.5)

## 2024-09-25 RX ORDER — POLYVINYL ALCOHOL 1 %
2 DROPS OPHTHALMIC (EYE)
Refills: 0 | Status: DISCONTINUED | OUTPATIENT
Start: 2024-09-25 | End: 2024-10-01

## 2024-09-25 RX ORDER — MONTELUKAST SODIUM 10 MG/1
10 TABLET, FILM COATED ORAL DAILY
Refills: 0 | Status: DISCONTINUED | OUTPATIENT
Start: 2024-09-25 | End: 2024-10-01

## 2024-09-25 RX ORDER — APIXABAN 5 MG/1
5 TABLET, FILM COATED ORAL
Refills: 0 | Status: DISCONTINUED | OUTPATIENT
Start: 2024-09-25 | End: 2024-10-01

## 2024-09-25 RX ORDER — METOPROLOL TARTRATE 50 MG
12.5 TABLET ORAL
Refills: 0 | Status: DISCONTINUED | OUTPATIENT
Start: 2024-09-25 | End: 2024-10-01

## 2024-09-25 RX ORDER — ANASTROZOLE 1 MG/1
1 TABLET ORAL DAILY
Refills: 0 | Status: DISCONTINUED | OUTPATIENT
Start: 2024-09-25 | End: 2024-10-01

## 2024-09-25 RX ORDER — ATORVASTATIN CALCIUM 10 MG/1
10 TABLET, FILM COATED ORAL AT BEDTIME
Refills: 0 | Status: DISCONTINUED | OUTPATIENT
Start: 2024-09-25 | End: 2024-10-01

## 2024-09-25 RX ORDER — PANTOPRAZOLE SODIUM 40 MG/1
40 TABLET, DELAYED RELEASE ORAL
Refills: 0 | Status: DISCONTINUED | OUTPATIENT
Start: 2024-09-25 | End: 2024-10-01

## 2024-09-25 RX ORDER — POLYVINYL ALCOHOL 1 %
2 DROPS OPHTHALMIC (EYE)
Refills: 0 | Status: DISCONTINUED | OUTPATIENT
Start: 2024-09-25 | End: 2024-09-25

## 2024-09-25 RX ORDER — METHYLPREDNISOLONE ACETATE 80 MG/ML
40 INJECTION, SUSPENSION INTRA-ARTICULAR; INTRALESIONAL; INTRAMUSCULAR; SOFT TISSUE EVERY 8 HOURS
Refills: 0 | Status: DISCONTINUED | OUTPATIENT
Start: 2024-09-25 | End: 2024-09-27

## 2024-09-25 RX ADMIN — IPRATROPIUM BROMIDE AND ALBUTEROL SULFATE 3 MILLILITER(S): .5; 3 SOLUTION RESPIRATORY (INHALATION) at 09:28

## 2024-09-25 RX ADMIN — AZITHROMYCIN 255 MILLIGRAM(S): 250 TABLET, FILM COATED ORAL at 06:03

## 2024-09-25 RX ADMIN — APIXABAN 5 MILLIGRAM(S): 5 TABLET, FILM COATED ORAL at 19:33

## 2024-09-25 RX ADMIN — ENOXAPARIN SODIUM 40 MILLIGRAM(S): 150 INJECTION SUBCUTANEOUS at 05:11

## 2024-09-25 RX ADMIN — METHYLPREDNISOLONE ACETATE 40 MILLIGRAM(S): 80 INJECTION, SUSPENSION INTRA-ARTICULAR; INTRALESIONAL; INTRAMUSCULAR; SOFT TISSUE at 05:08

## 2024-09-25 RX ADMIN — ATORVASTATIN CALCIUM 10 MILLIGRAM(S): 10 TABLET, FILM COATED ORAL at 21:56

## 2024-09-25 RX ADMIN — APIXABAN 5 MILLIGRAM(S): 5 TABLET, FILM COATED ORAL at 12:16

## 2024-09-25 RX ADMIN — IPRATROPIUM BROMIDE AND ALBUTEROL SULFATE 3 MILLILITER(S): .5; 3 SOLUTION RESPIRATORY (INHALATION) at 02:11

## 2024-09-25 RX ADMIN — Medication 2 DROP(S): at 19:38

## 2024-09-25 RX ADMIN — METHYLPREDNISOLONE ACETATE 40 MILLIGRAM(S): 80 INJECTION, SUSPENSION INTRA-ARTICULAR; INTRALESIONAL; INTRAMUSCULAR; SOFT TISSUE at 21:56

## 2024-09-25 RX ADMIN — IPRATROPIUM BROMIDE AND ALBUTEROL SULFATE 3 MILLILITER(S): .5; 3 SOLUTION RESPIRATORY (INHALATION) at 14:51

## 2024-09-25 RX ADMIN — IPRATROPIUM BROMIDE AND ALBUTEROL SULFATE 3 MILLILITER(S): .5; 3 SOLUTION RESPIRATORY (INHALATION) at 20:14

## 2024-09-25 RX ADMIN — Medication 1 DOSE(S): at 12:16

## 2024-09-25 RX ADMIN — Medication 100 MILLIGRAM(S): at 05:08

## 2024-09-25 RX ADMIN — METHYLPREDNISOLONE ACETATE 40 MILLIGRAM(S): 80 INJECTION, SUSPENSION INTRA-ARTICULAR; INTRALESIONAL; INTRAMUSCULAR; SOFT TISSUE at 13:44

## 2024-09-25 RX ADMIN — Medication 1 DOSE(S): at 22:29

## 2024-09-25 NOTE — PROGRESS NOTE ADULT - PROBLEM SELECTOR PLAN 1
p/w SOB , lethargy requiring BIPAP ( however patient always uses this at night).   vitals: HR 88, /56 RR 25 saturating 95 on Bipap   pro-bnp 1147   AB.28/pco2 80/po2 122/ Hco3 38/ base excess is 7.9   UA+  chest xray: Right upper lobe infiltrate concerning for pneumonia. Prominent cardiac silhouette with pulmonary venous engorgement but   without malcom pulmonary edema. Mild subsegmental atelectasis versus scarring in the left lower lobe.  Will transition to NC in AM.   likely 2/2 to COPD exacerbation  provoked by PNA. However can not rule out CHF exacerbation.  Follow plan as below.   ICU consulted and patient was cleared for the floors.   Can consider pulm consult. p/w SOB  lethargy requiring BIPAP   pro-bnp 1147   ABG indicative of chronic hypercapnia  UA+  CXR: Rt UL infiltrate concerning for PNA. Prominent cardiac silhouette with pulmonary venous engorgement but   without malcom pulmonary edema. Mild subsegmental atelectasis versus scarring in the Lt lower lobe.  likely 2/2 to COPD exacerbation provoked by PNA  pulm Dr. Son following  weaned to 2L NC

## 2024-09-25 NOTE — PROGRESS NOTE ADULT - PROBLEM SELECTOR PLAN 2
hx of COPD, on 2L of oxygen at home   p/w sob,   CXR shows: Right upper lobe infiltrate concerning for pneumonia. Prominent cardiac silhouette with pulmonary venous engorgement but   without malcom pulmonary edema. Mild subsegmental atelectasis versus scarring in the left lower lobe  -O2 support as needed, maintain O2 sat 88-92%, avoid over oxygenation  - c/w home CPAP   -solumedrol 40q8, taper down  -duoneb, symbicort, albuterol prn  -azithromycin 500qd  -Incentive spirometer  -can consider Pulm consult hx of COPD, on 2L PRN at home  p/w sob  CXR: Rt UL infiltrate concerning for PNA. Prominent cardiac silhouette with pulmonary venous engorgement but   without malcom pulmonary edema. Mild subsegmental atelectasis versus scarring in the Lt lower lobe.  likely 2/2 to COPD exacerbation provoked by PNA  pulm Dr. Son following  - O2 support as needed, maintain O2 sat 88-92%, avoid over oxygenation  - c/w home CPAP   - solumedrol 40q8, taper down  - duoneb, symbicort, albuterol prn  - azithromycin 500 qd  - incentive spirometer

## 2024-09-25 NOTE — CONSULT NOTE ADULT - SUBJECTIVE AND OBJECTIVE BOX
PULMONARY CONSULT NOTE      NUNO LAST  MRN-[id number]    History of Present Illness:  Reason for Admission: COPD vs CHF exacerbation  History of Present Illness:   Pt is a 89 yo F from home, AAOx0 , HHA 7 hours a day  5 days a week, ambulates with walker, with morbid obesity, COPD (home O2 2L, noncompliant with cpap, multiple hospitalizations at [facility] for COPD exacerbation under MRN [id number]), HFpEF (EF 55-60%), severe RV HTN and HLD  presenting with complaint of lethargy. Collateral info obtained from granddaughter and daughter Sylvia at bedside. Sylvia lives with the patient. Say that pt was in her usual state of health , had a drink of kenyon and was feeling well, until about 3pm on day of presentation when she started saying she was feeling unwell. They took her oxygen at home and it was 50s and then went back up to 80s They called EMS, she was put on CPAP and given 2 doses of SL nitro. In the ED pt was lethargic, encephalopathic, minimally conversant. As per family this is not her baseline. Family admits that pt has not been on her home cpap in a "very long time."           HISTORY OF PRESENT ILLNESS:    MEDICATIONS  (STANDING):  albuterol    90 MICROgram(s) HFA Inhaler 2 Puff(s) Inhalation every 6 hours  albuterol/ipratropium for Nebulization 3 milliLiter(s) Nebulizer every 6 hours  apixaban 5 milliGRAM(s) Oral two times a day  azithromycin  IVPB 500 milliGRAM(s) IV Intermittent every 24 hours  cefTRIAXone   IVPB 1000 milliGRAM(s) IV Intermittent every 24 hours  fluticasone propionate/ salmeterol 100-50 MICROgram(s) Diskus 1 Dose(s) Inhalation two times a day  methylPREDNISolone sodium succinate Injectable 40 milliGRAM(s) IV Push daily  pantoprazole    Tablet 40 milliGRAM(s) Oral before breakfast      MEDICATIONS  (PRN):      Allergies    Allergy Status Unknown    Intolerances        PAST MEDICAL & SURGICAL HISTORY:  HTN (hypertension)      HLD (hyperlipidemia)      COPD exacerbation      CHF, acute          FAMILY HISTORY:      SOCIAL HISTORY  Smoking History:     REVIEW OF SYSTEMS:    CONSTITUTIONAL:  No fevers, chills, sweats    HEENT:  Eyes:  No diplopia or blurred vision. ENT:  No earache, sore throat or runny nose.    CARDIOVASCULAR:  No pressure, squeezing, tightness, or heaviness about the chest; no palpitations.    RESPIRATORY:  Per HPI    GASTROINTESTINAL:  No abdominal pain, nausea, vomiting or diarrhea.    GENITOURINARY:  No dysuria, frequency or urgency.    NEUROLOGIC:  No paresthesias, fasciculations, seizures or weakness.    PSYCHIATRIC:  No disorder of thought or mood.    Vital Signs Last 24 Hrs  T(C): 36.6 (25 Sep 2024 08:24), Max: 36.9 (25 Sep 2024 04:30)  T(F): 97.9 (25 Sep 2024 08:24), Max: 98.4 (25 Sep 2024 04:30)  HR: 58 (25 Sep 2024 08:24) (58 - 88)  BP: 109/77 (25 Sep 2024 08:24) (90/67 - 122/56)  BP(mean): 73 (24 Sep 2024 21:48) (73 - 76)  RR: 19 (25 Sep 2024 08:24) (19 - 25)  SpO2: 97% (25 Sep 2024 08:24) (90% - 99%)    Parameters below as of 25 Sep 2024 08:24  Patient On (Oxygen Delivery Method): BiPAP/CPAP      I&O's Detail      PHYSICAL EXAMINATION:    GENERAL: The patient is a well-developed, well-nourished _____in no apparent distress.     HEENT: Head is normocephalic and atraumatic. Extraocular muscles are intact. Mucous membranes are moist.     NECK: Supple.     LUNGS: Clear to auscultation without wheezing, rales, or rhonchi. Respirations unlabored    HEART: Regular rate and rhythm without murmur.    ABDOMEN: Soft, nontender, and nondistended.  No hepatosplenomegaly is noted.    EXTREMITIES: Without any cyanosis, clubbing, rash, lesions or edema.    NEUROLOGIC: Grossly intact.      LABS:                        11.8   15.80 )-----------( 215      ( 25 Sep 2024 07:12 )             38.9     09-25    138  |  99  |  17  ----------------------------<  135[H]  4.2   |  34[H]  |  1.00    Ca    8.3[L]      25 Sep 2024 07:12  Phos  4.6     09-25  Mg     1.8     09-25    TPro  7.3  /  Alb  3.0[L]  /  TBili  1.0  /  DBili  x   /  AST  15  /  ALT  14  /  AlkPhos  119  09-25    PT/INR - ( 24 Sep 2024 17:00 )   PT: 21.6 sec;   INR: 1.86 ratio         PTT - ( 24 Sep 2024 17:00 )  PTT:41.7 sec  Urinalysis Basic - ( 25 Sep 2024 07:12 )    Color: x / Appearance: x / SG: x / pH: x  Gluc: 135 mg/dL / Ketone: x  / Bili: x / Urobili: x   Blood: x / Protein: x / Nitrite: x   Leuk Esterase: x / RBC: x / WBC x   Sq Epi: x / Non Sq Epi: x / Bacteria: x      ABG - ( 25 Sep 2024 04:06 )  pH, Arterial: 7.29  pH, Blood: x     /  pCO2: 74    /  pO2: 73    / HCO3: 36    / Base Excess: 6.3   /  SaO2: 93                CARDIAC MARKERS ( 24 Sep 2024 17:00 )  x     / x     / x     / x     / <1.0 ng/mL          Lactate, Blood: 1.8 mmol/L (09-24-24 @ 17:00)        MICROBIOLOGY:    RADIOLOGY & ADDITIONAL STUDIES:    < from: Xray Chest 1 View-PORTABLE IMMEDIATE (Xray Chest 1 View-PORTABLE IMMEDIATE .) (09.24.24 @ 17:22) >    IMPRESSION:  1. Right upper lobe infiltrate concerning for pneumonia.  2. Prominent cardiac silhouette with pulmonary venous engorgement but   without malcom pulmonary edema.  3. Mild subsegmental atelectasis versus scarring in the left lower lobe.      < end of copied text >  CXR:    Ct scan chest;    ekg;    echo: PULMONARY CONSULT NOTE      NUNO LAST  MRN-[id number]    History of Present Illness:  Reason for Admission: COPD vs CHF exacerbation  History of Present Illness:   Pt is a 89 yo F from home, AAOx0 , HHA 7 hours a day  5 days a week, ambulates with walker, with morbid obesity, COPD (home O2 2L, noncompliant with cpap, multiple hospitalizations at [facility] for COPD exacerbation under MRN [id number]), HFpEF (EF 55-60%), severe RV HTN and HLD  presenting with complaint of lethargy. Collateral info obtained from granddaughter and daughter Sylvia at bedside. Sylvia lives with the patient. Say that pt was in her usual state of health , had a drink of kenyon and was feeling well, until about 3pm on day of presentation when she started saying she was feeling unwell. They took her oxygen at home and it was 50s and then went back up to 80s They called EMS, she was put on CPAP and given 2 doses of SL nitro. In the ED pt was lethargic, encephalopathic, minimally conversant. As per family this is not her baseline. Family admits that pt has not been on her home cpap in a "very long time."           HISTORY OF PRESENT ILLNESS: As Above. Lethargic, arousable, laying in bed with oxygen supp via NC in NAD    MEDICATIONS  (STANDING):  albuterol    90 MICROgram(s) HFA Inhaler 2 Puff(s) Inhalation every 6 hours  albuterol/ipratropium for Nebulization 3 milliLiter(s) Nebulizer every 6 hours  apixaban 5 milliGRAM(s) Oral two times a day  azithromycin  IVPB 500 milliGRAM(s) IV Intermittent every 24 hours  cefTRIAXone   IVPB 1000 milliGRAM(s) IV Intermittent every 24 hours  fluticasone propionate/ salmeterol 100-50 MICROgram(s) Diskus 1 Dose(s) Inhalation two times a day  methylPREDNISolone sodium succinate Injectable 40 milliGRAM(s) IV Push daily  pantoprazole    Tablet 40 milliGRAM(s) Oral before breakfast      MEDICATIONS  (PRN):      Allergies    Allergy Status Unknown    Intolerances        PAST MEDICAL & SURGICAL HISTORY:  HTN (hypertension)      HLD (hyperlipidemia)      COPD exacerbation      CHF, acute          FAMILY HISTORY:      SOCIAL HISTORY  Smoking History:     REVIEW OF SYSTEMS:    CONSTITUTIONAL:  No fevers, chills, sweats    HEENT:  Eyes:  No diplopia or blurred vision. ENT:  No earache, sore throat or runny nose.    CARDIOVASCULAR:  No pressure, squeezing, tightness, or heaviness about the chest; no palpitations.    RESPIRATORY:  Per HPI    GASTROINTESTINAL:  No abdominal pain, nausea, vomiting or diarrhea.    GENITOURINARY:  No dysuria, frequency or urgency.    NEUROLOGIC:  No paresthesias, fasciculations, seizures or weakness.    PSYCHIATRIC:  No disorder of thought or mood.    Vital Signs Last 24 Hrs  T(C): 36.6 (25 Sep 2024 08:24), Max: 36.9 (25 Sep 2024 04:30)  T(F): 97.9 (25 Sep 2024 08:24), Max: 98.4 (25 Sep 2024 04:30)  HR: 58 (25 Sep 2024 08:24) (58 - 88)  BP: 109/77 (25 Sep 2024 08:24) (90/67 - 122/56)  BP(mean): 73 (24 Sep 2024 21:48) (73 - 76)  RR: 19 (25 Sep 2024 08:24) (19 - 25)  SpO2: 97% (25 Sep 2024 08:24) (90% - 99%)    Parameters below as of 25 Sep 2024 08:24  Patient On (Oxygen Delivery Method): BiPAP/CPAP      I&O's Detail      PHYSICAL EXAMINATION:    GENERAL: The patient is a well-developed, well-nourished _____in no apparent distress.     HEENT: Head is normocephalic and atraumatic. Extraocular muscles are intact. Mucous membranes are moist.     NECK: Supple.     LUNGS: Clear to auscultation without wheezing, rales, or rhonchi. Respirations unlabored    HEART: Regular rate and rhythm without murmur.    ABDOMEN: Soft, nontender, and nondistended.  No hepatosplenomegaly is noted.    EXTREMITIES: Without any cyanosis, clubbing, rash, lesions or edema.    NEUROLOGIC: Grossly intact.      LABS:                        11.8   15.80 )-----------( 215      ( 25 Sep 2024 07:12 )             38.9     09-25    138  |  99  |  17  ----------------------------<  135[H]  4.2   |  34[H]  |  1.00    Ca    8.3[L]      25 Sep 2024 07:12  Phos  4.6     09-25  Mg     1.8     09-25    TPro  7.3  /  Alb  3.0[L]  /  TBili  1.0  /  DBili  x   /  AST  15  /  ALT  14  /  AlkPhos  119  09-25    PT/INR - ( 24 Sep 2024 17:00 )   PT: 21.6 sec;   INR: 1.86 ratio         PTT - ( 24 Sep 2024 17:00 )  PTT:41.7 sec  Urinalysis Basic - ( 25 Sep 2024 07:12 )    Color: x / Appearance: x / SG: x / pH: x  Gluc: 135 mg/dL / Ketone: x  / Bili: x / Urobili: x   Blood: x / Protein: x / Nitrite: x   Leuk Esterase: x / RBC: x / WBC x   Sq Epi: x / Non Sq Epi: x / Bacteria: x      ABG - ( 25 Sep 2024 04:06 )  pH, Arterial: 7.29  pH, Blood: x     /  pCO2: 74    /  pO2: 73    / HCO3: 36    / Base Excess: 6.3   /  SaO2: 93                CARDIAC MARKERS ( 24 Sep 2024 17:00 )  x     / x     / x     / x     / <1.0 ng/mL          Lactate, Blood: 1.8 mmol/L (09-24-24 @ 17:00)        MICROBIOLOGY:    RADIOLOGY & ADDITIONAL STUDIES:    < from: Xray Chest 1 View-PORTABLE IMMEDIATE (Xray Chest 1 View-PORTABLE IMMEDIATE .) (09.24.24 @ 17:22) >    IMPRESSION:  1. Right upper lobe infiltrate concerning for pneumonia.  2. Prominent cardiac silhouette with pulmonary venous engorgement but   without malcom pulmonary edema.  3. Mild subsegmental atelectasis versus scarring in the left lower lobe.      < end of copied text >  CXR:    Ct scan chest;    ekg;    echo:

## 2024-09-25 NOTE — PROGRESS NOTE ADULT - PROBLEM SELECTOR PLAN 3
p/w sob and found to have hypercapnic respiratory failure   CXR shows: Right upper lobe infiltrate concerning for pneumonia. Prominent cardiac silhouette with pulmonary venous engorgement but   without malcom pulmonary edema. Mild subsegmental atelectasis versus scarring in the left lower lobe.  s/p Rocephin and azithromycin   rvp neg  c/w  azithromycin 500mg qd + rocephin 1g qd  f/u  strep ag, legionella, mycoplasma igm  tylenol prn  monitor sx  ID DR MARROQUIN p/w SOB and found to have hypercapnic respiratory failure   CXR: Rt UL infiltrate concerning for PNA. Prominent cardiac silhouette with pulmonary venous engorgement but   without malcom pulmonary edema. Mild subsegmental atelectasis versus scarring in the Lt lower lobe.  s/p rocephin and azithromycin   RVP neg  ID Dr. Denton consulted, pending recs  - c/w azithromycin 500mg qd + rocephin 1g qd  - f/u strep, legionella, mycoplasma p/w SOB and found to have hypercapnic respiratory failure   CXR: Rt UL infiltrate concerning for PNA. Prominent cardiac silhouette with pulmonary venous engorgement but   without malcom pulmonary edema. Mild subsegmental atelectasis versus scarring in the Lt lower lobe.  s/p rocephin and azithromycin   RVP neg  ID Dr. Denton consulted, pending recs  strep neg  - c/w azithromycin 500mg qd + rocephin 1g qd  - f/u legionella, mycoplasma  - f/u BCx

## 2024-09-25 NOTE — PROGRESS NOTE ADULT - PROBLEM SELECTOR PLAN 4
hx of CHF with last echo 2 years ago showing ef 56%  likely not the cause of the patients AHRF but is still possible   CXR shows: . Prominent cardiac silhouette with pulmonary venous engorgement but   without malcom pulmonary edema  BNP 1147  trop neg  admit to tele   s/p 60 mg of lasix  hold further lasix for now   -f/u echo  CADIOLOGY DR ETIENNE  -daily weights, strict I&O  -Cardio Consult in AM hx of CHF with last echo 2022, EF 56%  likely not the cause of the patients AHRF but still possible   CXR: Rt UL infiltrate concerning for PNA. Prominent cardiac silhouette with pulmonary venous engorgement but   without malcom pulmonary edema. Mild subsegmental atelectasis versus scarring in the Lt lower lobe.  BNP 1147, trop neg  s/p 60 mg of lasix  cardio Dr. Siu following  pt not in CHF as per cardio, no need for diuretics

## 2024-09-25 NOTE — PROGRESS NOTE ADULT - PROBLEM SELECTOR PLAN 6
hx of htn  unknown med rec  needs need rec  monitor bp hx of htn  monitor bp hx of htn on carvedilol  -c/w lopressor as per cardio

## 2024-09-25 NOTE — PROGRESS NOTE ADULT - SUBJECTIVE AND OBJECTIVE BOX
Patient was seen and examined  Patient is a 89y old  Female who presents with a chief complaint of COPD vs CHF exacerbation (24 Sep 2024 23:21)      INTERVAL HPI/OVERNIGHT EVENTS:  T(C): 36.6 (09-25-24 @ 08:24), Max: 36.9 (09-25-24 @ 04:30)  HR: 58 (09-25-24 @ 08:24) (58 - 88)  BP: 109/77 (09-25-24 @ 08:24) (90/67 - 122/56)  RR: 19 (09-25-24 @ 08:24) (19 - 25)  SpO2: 97% (09-25-24 @ 08:24) (90% - 99%)  Wt(kg): --  I&O's Summary      LABS:                        11.8   15.80 )-----------( 215      ( 25 Sep 2024 07:12 )             38.9     09-25    138  |  99  |  17  ----------------------------<  135[H]  4.2   |  34[H]  |  1.00    Ca    8.3[L]      25 Sep 2024 07:12  Phos  4.6     09-25  Mg     1.8     09-25    TPro  7.3  /  Alb  3.0[L]  /  TBili  1.0  /  DBili  x   /  AST  15  /  ALT  14  /  AlkPhos  119  09-25    PT/INR - ( 24 Sep 2024 17:00 )   PT: 21.6 sec;   INR: 1.86 ratio         PTT - ( 24 Sep 2024 17:00 )  PTT:41.7 sec  Urinalysis Basic - ( 25 Sep 2024 07:12 )    Color: x / Appearance: x / SG: x / pH: x  Gluc: 135 mg/dL / Ketone: x  / Bili: x / Urobili: x   Blood: x / Protein: x / Nitrite: x   Leuk Esterase: x / RBC: x / WBC x   Sq Epi: x / Non Sq Epi: x / Bacteria: x      CAPILLARY BLOOD GLUCOSE      POCT Blood Glucose.: 165 mg/dL (24 Sep 2024 16:46)        ABG - ( 25 Sep 2024 04:06 )  pH, Arterial: 7.29  pH, Blood: x     /  pCO2: 74    /  pO2: 73    / HCO3: 36    / Base Excess: 6.3   /  SaO2: 93                Urinalysis Basic - ( 25 Sep 2024 07:12 )    Color: x / Appearance: x / SG: x / pH: x  Gluc: 135 mg/dL / Ketone: x  / Bili: x / Urobili: x   Blood: x / Protein: x / Nitrite: x   Leuk Esterase: x / RBC: x / WBC x   Sq Epi: x / Non Sq Epi: x / Bacteria: x        MEDICATIONS  (STANDING):  albuterol    90 MICROgram(s) HFA Inhaler 2 Puff(s) Inhalation every 6 hours  albuterol/ipratropium for Nebulization 3 milliLiter(s) Nebulizer every 6 hours  azithromycin  IVPB 500 milliGRAM(s) IV Intermittent every 24 hours  cefTRIAXone   IVPB 1000 milliGRAM(s) IV Intermittent every 24 hours  enoxaparin Injectable 40 milliGRAM(s) SubCutaneous every 24 hours  fluticasone propionate/ salmeterol 100-50 MICROgram(s) Diskus 1 Dose(s) Inhalation two times a day  methylPREDNISolone sodium succinate Injectable 40 milliGRAM(s) IV Push daily    MEDICATIONS  (PRN):      RADIOLOGY & ADDITIONAL TESTS:    Imaging Personally Reviewed:  [ ] YES  [ ] NO    REVIEW OF SYSTEMS:  CONSTITUTIONAL: No fever, weight loss, or fatigue  EYES: No eye pain, visual disturbances, or discharge  ENMT:  No difficulty hearing, tinnitus, vertigo; No sinus or throat pain  NECK: No pain or stiffness  BREASTS: No pain, masses, or nipple discharge  RESPIRATORY: No cough, wheezing, chills or hemoptysis; No shortness of breath  CARDIOVASCULAR: No chest pain, palpitations, dizziness, or leg swelling  GASTROINTESTINAL: No abdominal or epigastric pain. No nausea, vomiting, or hematemesis; No diarrhea or constipation. No melena or hematochezia.  GENITOURINARY: No dysuria, frequency, hematuria, or incontinence  NEUROLOGICAL: No headaches, memory loss, loss of strength, numbness, or tremors  SKIN: No itching, burning, rashes, or lesions   LYMPH NODES: No enlarged glands  ENDOCRINE: No heat or cold intolerance; No hair loss  MUSCULOSKELETAL: No joint pain or swelling; No muscle, back, or extremity pain  PSYCHIATRIC: No depression, anxiety, mood swings, or difficulty sleeping  HEME/LYMPH: No easy bruising, or bleeding gums  ALLERY AND IMMUNOLOGIC: No hives or eczema      Consultant(s) Notes Reviewed:  [ ] YES  [ ] NO    PHYSICAL EXAM:  GENERAL: NAD, well-groomed, well-developed  HEAD:  Atraumatic, Normocephalic  EYES: EOMI, PERRLA, conjunctiva and sclera clear  ENMT: No tonsillar erythema, exudates, or enlargement; Moist mucous membranes, Good dentition, No lesions  NECK: Supple, No JVD, Normal thyroid  NERVOUS SYSTEM:  Alert & Oriented X3, Good concentration; Motor Strength 5/5 B/L upper and lower extremities; DTRs 2+ intact and symmetric  CHEST/LUNG: Clear to percussion bilaterally; No rales, rhonchi, wheezing, or rubs  HEART: Regular rate and rhythm; No murmurs, rubs, or gallops  ABDOMEN: Soft, Nontender, Nondistended; Bowel sounds present  EXTREMITIES:  2+ Peripheral Pulses, No clubbing, cyanosis, or edema  LYMPH: No lymphadenopathy noted  SKIN: No rashes or lesions    Care Discussed with Consultants/Other Providers [ x] YES  [ ] NO

## 2024-09-25 NOTE — CONSULT NOTE ADULT - SUBJECTIVE AND OBJECTIVE BOX
Date of Service  24 @ 10:13    CHIEF COMPLAINT:Patient is a 89y old  Female who presents with a chief complaint of COPD vs CHF exacerbation .      HPI:  Pt is a 89 yo F from home, AAOx0 , HHA 7 hours a day  5 days a week, ambulates with walker, with morbid obesity, COPD (home O2 2L, noncompliant with cpap, multiple hospitalizations at [facility] for COPD exacerbation under MRN [id number]), HFpEF (EF 55-60%), severe RV HTN,Chronic Afib and HLD  presenting with complaint of lethargy. Collateral info obtained from granddaughter and daughter Sylvia at bedside. Sylvia lives with the patient. Say that pt was in her usual state of health , had a drink of kenyon and was feeling well, until about 3pm on day of presentation when she started saying she was feeling unwell. They took her oxygen at home and it was 50s and then went back up to 80s They called EMS, she was put on CPAP and given 2 doses of SL nitro. In the ED pt was lethargic, encephalopathic, minimally conversant. As per family this is not her baseline. Family admits that pt has not been on her home cpap in a "very long time."       ED course:  vitals: HR 88, /56 RR 25 saturating 95 on Bipap   pro-bnp 1147   AB.28/pco2 80/po2 122/ Hco3 38/ base excess is 7.9   UA+  chest xray: Right upper lobe infiltrate concerning for pneumonia. Prominent cardiac silhouette with pulmonary venous engorgement but   without malcom pulmonary edema. Mild subsegmental atelectasis versus scarring in the left lower lobe.  s/p ctx and azithromycin  s/p 60 mg lasix   (24 Sep 2024 23:21)      PAST MEDICAL & SURGICAL HISTORY:  HTN (hypertension)      HLD (hyperlipidemia)      COPD exacerbation      CHF, acute          MEDICATIONS  (STANDING):  albuterol    90 MICROgram(s) HFA Inhaler 2 Puff(s) Inhalation every 6 hours  albuterol/ipratropium for Nebulization 3 milliLiter(s) Nebulizer every 6 hours  azithromycin  IVPB 500 milliGRAM(s) IV Intermittent every 24 hours  cefTRIAXone   IVPB 1000 milliGRAM(s) IV Intermittent every 24 hours  enoxaparin Injectable 40 milliGRAM(s) SubCutaneous every 24 hours  fluticasone propionate/ salmeterol 100-50 MICROgram(s) Diskus 1 Dose(s) Inhalation two times a day  methylPREDNISolone sodium succinate Injectable 40 milliGRAM(s) IV Push daily    MEDICATIONS  (PRN):      FAMILY HISTORY:unable to obtain      SOCIAL HISTORY:    unable to obtain  Allergies    Allergy Status Unknown    Intolerances    	    REVIEW OF SYSTEMS:    [ ] Unable to obtain    PHYSICAL EXAM:  T(C): 36.6 (24 @ 08:24), Max: 36.9 (24 @ 04:30)  HR: 58 (24 @ 08:24) (58 - 88)  BP: 109/77 (24 @ 08:24) (90/67 - 122/56)  RR: 19 (24 @ 08:24) (19 - 25)  SpO2: 97% (24 @ 08:24) (90% - 99%)  Wt(kg): --  I&O's Summary      Appearance: Normal	  HEENT:   Normal oral mucosa, PERRL, EOMI	  Lymphatic: No lymphadenopathy  Cardiovascular: Normal S1 S2, No JVD, No murmurs, No edema  Respiratory: Lungs clear to auscultation	  Gastrointestinal:  Soft, Non-tender, + BS	  Skin: No rashes, No ecchymoses, No cyanosis	  Neurologic: Non-focal  Extremities: Normal range of motion, No clubbing, cyanosis or edema  Vascular: Peripheral pulses palpable 2+ bilaterally    	    ECG:  afib q anterior leads	  	  	  LABS:	 	  CARDIAC MARKERS ( 24 Sep 2024 17:00 )  x     / x     / x     / x     / <1.0 ng/mL      Troponin I, High Sensitivity Result: 46.6 ng/L (24 @ 17:00)                          11.8   15.80 )-----------( 215      ( 25 Sep 2024 07:12 )             38.9         138  |  99  |  17  ----------------------------<  135[H]  4.2   |  34[H]  |  1.00    Ca    8.3[L]      25 Sep 2024 07:12  Phos  4.6       Mg     1.8         TPro  7.3  /  Alb  3.0[L]  /  TBili  1.0  /  DBili  x   /  AST  15  /  ALT  14  /  AlkPhos  119      < from: Xray Chest 1 View-PORTABLE IMMEDIATE (Xray Chest 1 View-PORTABLE IMMEDIATE .) (24 @ 17:22) >    ACC: [id number] EXAM:  XR CHEST PORTABLE IMMED 1V   ORDERED BY: TERE ORR     PROCEDURE DATE:  2024          INTERPRETATION:  An AP portable upright chest x-ray was performed for   shortness of breath.    There are no prior studies for comparison.    There is an infiltrate in the right upper lobe concerning for pneumonia.   Subsegmental atelectasis versus scarring in the left lower lobe is seen.   There is slight elevation of the right hemidiaphragm. There is no   pneumothorax. No pleural fluid is seen. The cardiac silhouette is   prominent with engorgement of central pulmonary veins but without malcom   pulmonary edema. There are degenerative changes of the thoracic spine and   both shoulders.    IMPRESSION:  1. Right upper lobe infiltrate concerning for pneumonia.  2. Prominent cardiac silhouette with pulmonary venous engorgement but   without malcom pulmonary edema.  3. Mild subsegmental atelectasis versus scarring in the left lower lobe.    --- End of Report ---            ISRRAEL AN MD; Attending Radiologist  This document has been electronically signed. Sep 24 2024  5:25PM    < end of copied text >      Blood Gas Profile - Arterial (24 @ 17:15)   pH, Arterial: 7.28  pCO2, Arterial: 80: TYPE:(C=Critical, N=Notification, A=Abnormal) C   TESTS: _PC02 80   DATE/TIME CALLED: 2024 17:25:12 EDT_   CALLED TO: DR TERE ORR_   READ BACK (2 Patient Identifiers)(Y/N): _Y   READ BACK VALUES (Y/N): Y_   CALLED BY:LINDSEY CHASE RRT _ mmHg  pO2, Arterial: 122 mmHg  HCO3, Arterial: 38 mmol/L  Base Excess, Arterial: 7.9 mmol/L  Oxygen Saturation, Arterial: 98 %  FIO2, Arterial: 60.0  Blood Gas Comments Arterial: ABG ON BIPAP12//16/60%Blood Gas Profile - Arterial (24 @ 19:54)   pH, Arterial: 7.26  pCO2, Arterial: 84: TYPE:(C=Critical, N=Notification, A=Abnormal) C   TESTS: _PCO2 84   DATE/TIME CALLED: _2024 19:58:51 EDT   CALLED TO: _KIRBY MCKINNEY   READ BACK (2 Patient Identifiers)(Y/N): _Y   READ BACK VALUES (Y/N): _Y   CALLED BY: _MELBOR mmHg  pO2, Arterial: 72 mmHg  HCO3, Arterial: 38 mmol/L  Base Excess, Arterial: 8.0 mmol/L  Oxygen Saturation, Arterial: 94 %  FIO2, Arterial: 30  Blood Gas Comments Arterial: nc 3 L rrBlood Gas Profile - Arterial (24 @ 04:06)   pH, Arterial: 7.29  pCO2, Arterial: 74: TYPE:(C=Critical, N=Notification, A=Abnormal) C   TESTS: _PCO2 74   DATE/TIME CALLED: _2024 04:15:36 EDT   CALLED TO: _KAILA JERONIMO   READ BACK (2 Patient Identifiers)(Y/N): _Y   READ BACK VALUES (Y/N): _Y   CALLED BY: _PD RRT mmHg  pO2, Arterial: 73 mmHg  HCO3, Arterial: 36 mmol/L  Base Excess, Arterial: 6.3 mmol/L  Oxygen Saturation, Arterial: 93 %  FIO2, Arterial: 40.0  Blood Gas Comments Arterial: BIPAP 16/5 40%, LR

## 2024-09-25 NOTE — CONSULT NOTE ADULT - SUBJECTIVE AND OBJECTIVE BOX
Patient is a 89y old  Female who is from home, AAOx0 , HHA 7 hours a day  5 days a week, ambulates with walker, with morbid obesity, COPD (home O2 2L, noncompliant with cpap, multiple hospitalizations at [facility] for COPD exacerbation under MRN [id number]), HFpEF (EF 55-60%), severe RV HTN and HLD, now presents to the ER for evaluation of lethargy. Collateral info obtained from granddaughter and daughter Sylvia at bedside. Sylvia lives with the patient. Say that pt was in her usual state of health , had a drink of kenyon and was feeling well, until about 3pm on day of presentation when she started saying she was feeling unwell. They took her oxygen at home and it was 50s and then went back up to 80s They called EMS, she was put on CPAP and given 2 doses of SL nitro. In the ED pt was lethargic, encephalopathic, minimally conversant. As per family this is not her baseline. Family admits that pt has not been on her home cpap in a "very long time." On admission, she found to have no fever, but positive Urine analysis and RUL infiltrate. She has started on Ceftriaxone and Azithromycin, and the ID consult requested to assist with further evaluation and antibiotic management.      REVIEW OF SYSTEMS: Total of twelve systems have been reviewed with patient and found to be negative unless mentioned in HPI      PAST MEDICAL & SURGICAL HISTORY:  HTN (hypertension)  HLD (hyperlipidemia)  COPD exacerbation  CHF, acute      SOCIAL HISTORY  Alcohol: Does not drink  Tobacco: Does not smoke  Illicit substance use: None      FAMILY HISTORY: Non contributory to the present illness      ALLERGY: Allergy Status Unknown      Vital Signs Last 24 Hrs  T(C): 37.2 (25 Sep 2024 15:35), Max: 37.7 (25 Sep 2024 11:42)  T(F): 99 (25 Sep 2024 15:35), Max: 99.8 (25 Sep 2024 11:42)  HR: 80 (25 Sep 2024 15:35) (58 - 88)  BP: 108/66 (25 Sep 2024 15:35) (90/67 - 122/56)  BP(mean): 73 (24 Sep 2024 21:48) (73 - 76)  RR: 19 (25 Sep 2024 15:35) (19 - 25)  SpO2: 98% (25 Sep 2024 15:35) (90% - 99%)    Parameters below as of 25 Sep 2024 15:35  Patient On (Oxygen Delivery Method): nasal cannula  O2 Flow (L/min): 2      PHYSICAL EXAM:  GENERAL: Not in distress   CHEST/LUNG:  Not using accessory muscles   HEART: s1 and s2 present  ABDOMEN:  Nontender and  Nondistended  EXTREMITIES: No pedal  edema  CNS: Awake and Alert      LABS:                        11.8   15.80 )-----------( 215      ( 25 Sep 2024 07:12 )             38.9       09-25    138  |  99  |  17  ----------------------------<  135[H]  4.2   |  34[H]  |  1.00    Ca    8.3[L]      25 Sep 2024 07:12  Phos  4.6     09-25  Mg     1.8     09-25    TPro  7.3  /  Alb  3.0[L]  /  TBili  1.0  /  DBili  x   /  AST  15  /  ALT  14  /  AlkPhos  119  09-25    PT/INR - ( 24 Sep 2024 17:00 )   PT: 21.6 sec;   INR: 1.86 ratio       PTT - ( 24 Sep 2024 17:00 )  PTT:41.7 sec      POCT Blood Glucose.: 165 mg/dL (24 Sep 2024 16:46)  ABG - ( 25 Sep 2024 04:06 )  pH, Arterial: 7.29  pH, Blood: x     /  pCO2: 74    /  pO2: 73    / HCO3: 36    / Base Excess: 6.3   /  SaO2: 93            MEDICATIONS  (STANDING):  albuterol    90 MICROgram(s) HFA Inhaler 2 Puff(s) Inhalation every 6 hours  albuterol/ipratropium for Nebulization 3 milliLiter(s) Nebulizer every 6 hours  anastrozole 1 milliGRAM(s) Oral daily  apixaban 5 milliGRAM(s) Oral two times a day  atorvastatin 10 milliGRAM(s) Oral at bedtime  azithromycin  IVPB 500 milliGRAM(s) IV Intermittent every 24 hours  cefTRIAXone   IVPB 1000 milliGRAM(s) IV Intermittent every 24 hours  fluticasone propionate/ salmeterol 100-50 MICROgram(s) Diskus 1 Dose(s) Inhalation two times a day  methylPREDNISolone sodium succinate Injectable 40 milliGRAM(s) IV Push every 8 hours  metoprolol tartrate 12.5 milliGRAM(s) Oral two times a day  montelukast 10 milliGRAM(s) Oral daily  pantoprazole    Tablet 40 milliGRAM(s) Oral before breakfast    MEDICATIONS  (PRN):      RADIOLOGY & ADDITIONAL TESTS:    9/24/24: Xray Chest 1 View-PORTABLE IMMEDIATE (Xray Chest 1 View-PORTABLE IMMEDIATE .) (09.24.24 @ 17:22) >  1. Right upper lobe infiltrate concerning for pneumonia.  2. Prominent cardiac silhouette with pulmonary venous engorgement but   without malcom pulmonary edema.  3. Mild subsegmental atelectasis versus scarring in the left lower lobe.      MICROBIOLOGY DATA:    Urine Microscopic-Add On (NC) (09.24.24 @ 21:30)   Red Blood Cell - Urine: 8 /HPF  White Blood Cell - Urine: 25 /HPF  Bacteria: Moderate /HPF  Squamous Epithelial Cells: Present             Patient is a 89y old  Female who is from home, AAOx0 , HHA 7 hours a day  5 days a week, ambulates with walker, with morbid obesity, COPD (home O2 2L, noncompliant with cpap, multiple hospitalizations at [facility] for COPD exacerbation under MRN [id number]), HFpEF (EF 55-60%), severe RV HTN and HLD, now presents to the ER for evaluation of lethargy. Collateral info obtained from granddaughter and daughter Sylvia at bedside. Sylvia lives with the patient. Say that pt was in her usual state of health , had a drink of kenyon and was feeling well, until about 3pm on day of presentation when she started saying she was feeling unwell. They took her oxygen at home and it was 50s and then went back up to 80s They called EMS, she was put on CPAP and given 2 doses of SL nitro. In the ED pt was lethargic, encephalopathic, minimally conversant. As per family this is not her baseline. Family admits that pt has not been on her home cpap in a "very long time." On admission, she found to have no fever, but positive Urine analysis and RUL infiltrate. She has started on Ceftriaxone and Azithromycin, and the ID consult requested to assist with further evaluation and antibiotic management.      REVIEW OF SYSTEMS: Total of twelve systems have been reviewed with family at the bed side and found to be negative unless mentioned in HPI      PAST MEDICAL & SURGICAL HISTORY:  HTN (hypertension)  HLD (hyperlipidemia)  COPD exacerbation  CHF, acute      SOCIAL HISTORY  Alcohol: Does not drink  Tobacco: Does not smoke  Illicit substance use: None      FAMILY HISTORY: Non contributory to the present illness      ALLERGY: Allergy Status Unknown      Vital Signs Last 24 Hrs  T(C): 37.2 (25 Sep 2024 15:35), Max: 37.7 (25 Sep 2024 11:42)  T(F): 99 (25 Sep 2024 15:35), Max: 99.8 (25 Sep 2024 11:42)  HR: 80 (25 Sep 2024 15:35) (58 - 88)  BP: 108/66 (25 Sep 2024 15:35) (90/67 - 122/56)  BP(mean): 73 (24 Sep 2024 21:48) (73 - 76)  RR: 19 (25 Sep 2024 15:35) (19 - 25)  SpO2: 98% (25 Sep 2024 15:35) (90% - 99%)    Parameters below as of 25 Sep 2024 15:35  Patient On (Oxygen Delivery Method): nasal cannula  O2 Flow (L/min): 2      PHYSICAL EXAM:  GENERAL: Not in distress   CHEST/LUNG:  Not using accessory muscles   HEART: s1 and s2 present  ABDOMEN:  Nontender and  Nondistended  EXTREMITIES: No pedal  edema  CNS: Awake and Alert      LABS:                        11.8   15.80 )-----------( 215      ( 25 Sep 2024 07:12 )             38.9       09-25    138  |  99  |  17  ----------------------------<  135[H]  4.2   |  34[H]  |  1.00    Ca    8.3[L]      25 Sep 2024 07:12  Phos  4.6     09-25  Mg     1.8     09-25    TPro  7.3  /  Alb  3.0[L]  /  TBili  1.0  /  DBili  x   /  AST  15  /  ALT  14  /  AlkPhos  119  09-25    PT/INR - ( 24 Sep 2024 17:00 )   PT: 21.6 sec;   INR: 1.86 ratio       PTT - ( 24 Sep 2024 17:00 )  PTT:41.7 sec      POCT Blood Glucose.: 165 mg/dL (24 Sep 2024 16:46)  ABG - ( 25 Sep 2024 04:06 )  pH, Arterial: 7.29  pH, Blood: x     /  pCO2: 74    /  pO2: 73    / HCO3: 36    / Base Excess: 6.3   /  SaO2: 93            MEDICATIONS  (STANDING):  albuterol    90 MICROgram(s) HFA Inhaler 2 Puff(s) Inhalation every 6 hours  albuterol/ipratropium for Nebulization 3 milliLiter(s) Nebulizer every 6 hours  anastrozole 1 milliGRAM(s) Oral daily  apixaban 5 milliGRAM(s) Oral two times a day  atorvastatin 10 milliGRAM(s) Oral at bedtime  azithromycin  IVPB 500 milliGRAM(s) IV Intermittent every 24 hours  cefTRIAXone   IVPB 1000 milliGRAM(s) IV Intermittent every 24 hours  fluticasone propionate/ salmeterol 100-50 MICROgram(s) Diskus 1 Dose(s) Inhalation two times a day  methylPREDNISolone sodium succinate Injectable 40 milliGRAM(s) IV Push every 8 hours  metoprolol tartrate 12.5 milliGRAM(s) Oral two times a day  montelukast 10 milliGRAM(s) Oral daily  pantoprazole    Tablet 40 milliGRAM(s) Oral before breakfast    MEDICATIONS  (PRN):      RADIOLOGY & ADDITIONAL TESTS:    9/24/24: Xray Chest 1 View-PORTABLE IMMEDIATE (Xray Chest 1 View-PORTABLE IMMEDIATE .) (09.24.24 @ 17:22) >  1. Right upper lobe infiltrate concerning for pneumonia.  2. Prominent cardiac silhouette with pulmonary venous engorgement but   without malcom pulmonary edema.  3. Mild subsegmental atelectasis versus scarring in the left lower lobe.      MICROBIOLOGY DATA:    Urine Microscopic-Add On (NC) (09.24.24 @ 21:30)   Red Blood Cell - Urine: 8 /HPF  White Blood Cell - Urine: 25 /HPF  Bacteria: Moderate /HPF  Squamous Epithelial Cells: Present

## 2024-09-25 NOTE — PATIENT PROFILE ADULT - FALL HARM RISK - HARM RISK INTERVENTIONS

## 2024-09-25 NOTE — PROGRESS NOTE ADULT - PROBLEM SELECTOR PLAN 5
ua+  unable to obtain ROS  c/w Rocephin for now   f/u UCX UA +ve  pt asymptomatic  - c/w rocephin for now   - f/u UCx

## 2024-09-25 NOTE — PROGRESS NOTE ADULT - ASSESSMENT
89 yo F, with morbid obesity, COPD (home O2 2L, noncompliant with cpap, multiple hospitalizations at [facility] for COPD exacerbation under MRN [id number]), HFpEF (EF 55-60%), severe RV HTN, and HLD is coming in with SOB. AB.28/pco2 80/po2 122/ Hco3 38/ base excess is 7.9. chest xray: Right upper lobe infiltrate concerning for pneumonia. Prominent cardiac silhouette with pulmonary venous engorgement but without malcom pulmonary edema. Mild subsegmental atelectasis versus scarring in the left lower lobe  Patient is being admitted  for  hypercapnic respiratory failure likely 2/2 COPD exacerbation provoked b   89 F, w/ morbid obesity, COPD (home O2 2L, noncompliant with CPAP, multiple hospitalizations at [facility] for COPD exacerbation under MRN [id number]), HFpEF (EF 55-60%), severe RV HTN, HLD p/w SOB. AB.28/pco2 80/po2 122/ Hco3 38/ base excess is 7.9. CXR: Rt UL infiltrate concerning for PNA. Prominent cardiac silhouette w/ pulmonary venous engorgement but w/o malcom pulmonary edema. Mild subsegmental atelectasis vs scarring in Lt lower lobe. Admitted for hypercapnic respiratory failure likely 2/2 COPD exacerbation.    89 F, w/ morbid obesity, COPD (home O2 2L, noncompliant with CPAP, multiple hospitalizations at [facility] for COPD exacerbation under MRN [id number]), HFpEF (EF 55-60%), severe RV HTN, HLD p/w SOB. AB.28/pco2 80/po2 122/ Hco3 38/ base excess is 7.9. CXR: Rt UL infiltrate concerning for PNA. Prominent cardiac silhouette w/ pulmonary venous engorgement but w/o malcom pulmonary edema. Mild subsegmental atelectasis vs scarring in Lt lower lobe. Admitted for hypercapnic respiratory failure likely 2/2 COPD exacerbation. Weaned to 2L NC.

## 2024-09-25 NOTE — CONSULT NOTE ADULT - ASSESSMENT
Patient is a 89y old  Female who is from home, AAOx0 , HHA 7 hours a day  5 days a week, ambulates with walker, with morbid obesity, COPD (home O2 2L, noncompliant with cpap, multiple hospitalizations at [facility] for COPD exacerbation under MRN [id number]), HFpEF (EF 55-60%), severe RV HTN and HLD, now presents to the ER for evaluation of lethargy. Collateral info obtained from granddaughter and daughter Sylvia at bedside. Sylvia lives with the patient. Say that pt was in her usual state of health , had a drink of kenyon and was feeling well, until about 3pm on day of presentation when she started saying she was feeling unwell. They took her oxygen at home and it was 50s and then went back up to 80s They called EMS, she was put on CPAP and given 2 doses of SL nitro. In the ED pt was lethargic, encephalopathic, minimally conversant. As per family this is not her baseline. Family admits that pt has not been on her home cpap in a "very long time." On admission, she found to have no fever, but positive Urine analysis and RUL infiltrate. She has started on Ceftriaxone and Azithromycin, and the ID consult requested to assist with further evaluation and antibiotic management.    # Pneumonia  # Acute Respiratory Failure - on BIPAP  # UTI    would recommend:    1. Follow up Urine and Blood cultures  2. Aspiration precaution  3. Supplemental oxygenation and bronchodilator as needed  4. Continue Ceftriaxone and Azithromycin until work up is done    will follow the patient with you and make further recommendation based on the clinical course and Lab results  Thank you for the opportunity to participate in Ms. Constantino's care    Attending Attestation:     Spent more than 65 minutes on total encounter, more than 50 % of the visit was spent counseling and/or coordinating care by the Attending physician.

## 2024-09-25 NOTE — CONSULT NOTE ADULT - ASSESSMENT
89 yo F from home, AAOx0 , HHA 7 hours a day  5 days a week, ambulates with walker, with morbid obesity, COPD (home O2 2L, noncompliant with cpap, multiple hospitalizations at [facility] for COPD exacerbation under MRN [id number]), HFpEF (EF 55-60%), severe RV HTN,Chronic Afib and HLD  presenting with complaint of lethargy,acute respiratory failure with CO2 retention and RUL pneumonia.  1.Tele monitoring.  2.Afib-NOAC,lopressor.  3.RUL pneumonia-abx.  4.COPD and CO2 retention-off Bipap,Pulm eval,steroids.  5.Pt not in CHF,no need for diuretics.  6.PPI.

## 2024-09-26 LAB
ALBUMIN SERPL ELPH-MCNC: 2.9 G/DL — LOW (ref 3.5–5)
ALP SERPL-CCNC: 114 U/L — SIGNIFICANT CHANGE UP (ref 40–120)
ALT FLD-CCNC: 15 U/L DA — SIGNIFICANT CHANGE UP (ref 10–60)
ANION GAP SERPL CALC-SCNC: 4 MMOL/L — LOW (ref 5–17)
AST SERPL-CCNC: 12 U/L — SIGNIFICANT CHANGE UP (ref 10–40)
BILIRUB SERPL-MCNC: 1 MG/DL — SIGNIFICANT CHANGE UP (ref 0.2–1.2)
BUN SERPL-MCNC: 26 MG/DL — HIGH (ref 7–18)
CALCIUM SERPL-MCNC: 8.4 MG/DL — SIGNIFICANT CHANGE UP (ref 8.4–10.5)
CHLORIDE SERPL-SCNC: 98 MMOL/L — SIGNIFICANT CHANGE UP (ref 96–108)
CHOLEST SERPL-MCNC: 136 MG/DL — SIGNIFICANT CHANGE UP
CO2 SERPL-SCNC: 34 MMOL/L — HIGH (ref 22–31)
CREAT SERPL-MCNC: 1 MG/DL — SIGNIFICANT CHANGE UP (ref 0.5–1.3)
EGFR: 53 ML/MIN/1.73M2 — LOW
GLUCOSE SERPL-MCNC: 172 MG/DL — HIGH (ref 70–99)
HCT VFR BLD CALC: 35.6 % — SIGNIFICANT CHANGE UP (ref 34.5–45)
HDLC SERPL-MCNC: 66 MG/DL — SIGNIFICANT CHANGE UP
HGB BLD-MCNC: 11 G/DL — LOW (ref 11.5–15.5)
LIPID PNL WITH DIRECT LDL SERPL: 61 MG/DL — SIGNIFICANT CHANGE UP
M PNEUMO IGM SER-ACNC: 0.61 INDEX — SIGNIFICANT CHANGE UP (ref 0–0.9)
MAGNESIUM SERPL-MCNC: 2.1 MG/DL — SIGNIFICANT CHANGE UP (ref 1.6–2.6)
MCHC RBC-ENTMCNC: 30.4 PG — SIGNIFICANT CHANGE UP (ref 27–34)
MCHC RBC-ENTMCNC: 30.9 GM/DL — LOW (ref 32–36)
MCV RBC AUTO: 98.3 FL — SIGNIFICANT CHANGE UP (ref 80–100)
MYCOPLASMA AG SPEC QL: NEGATIVE — SIGNIFICANT CHANGE UP
NON HDL CHOLESTEROL: 70 MG/DL — SIGNIFICANT CHANGE UP
NRBC # BLD: 0 /100 WBCS — SIGNIFICANT CHANGE UP (ref 0–0)
PHOSPHATE SERPL-MCNC: 3.6 MG/DL — SIGNIFICANT CHANGE UP (ref 2.5–4.5)
PLATELET # BLD AUTO: 202 K/UL — SIGNIFICANT CHANGE UP (ref 150–400)
POTASSIUM SERPL-MCNC: 4.4 MMOL/L — SIGNIFICANT CHANGE UP (ref 3.5–5.3)
POTASSIUM SERPL-SCNC: 4.4 MMOL/L — SIGNIFICANT CHANGE UP (ref 3.5–5.3)
PROT SERPL-MCNC: 7.5 G/DL — SIGNIFICANT CHANGE UP (ref 6–8.3)
RBC # BLD: 3.62 M/UL — LOW (ref 3.8–5.2)
RBC # FLD: 14.6 % — HIGH (ref 10.3–14.5)
SODIUM SERPL-SCNC: 136 MMOL/L — SIGNIFICANT CHANGE UP (ref 135–145)
TRIGL SERPL-MCNC: 46 MG/DL — SIGNIFICANT CHANGE UP
WBC # BLD: 11.48 K/UL — HIGH (ref 3.8–10.5)
WBC # FLD AUTO: 11.48 K/UL — HIGH (ref 3.8–10.5)

## 2024-09-26 RX ADMIN — IPRATROPIUM BROMIDE AND ALBUTEROL SULFATE 3 MILLILITER(S): .5; 3 SOLUTION RESPIRATORY (INHALATION) at 15:29

## 2024-09-26 RX ADMIN — ATORVASTATIN CALCIUM 10 MILLIGRAM(S): 10 TABLET, FILM COATED ORAL at 22:20

## 2024-09-26 RX ADMIN — Medication 2 DROP(S): at 12:37

## 2024-09-26 RX ADMIN — Medication 100 MILLIGRAM(S): at 05:10

## 2024-09-26 RX ADMIN — MONTELUKAST SODIUM 10 MILLIGRAM(S): 10 TABLET, FILM COATED ORAL at 12:38

## 2024-09-26 RX ADMIN — METHYLPREDNISOLONE ACETATE 40 MILLIGRAM(S): 80 INJECTION, SUSPENSION INTRA-ARTICULAR; INTRALESIONAL; INTRAMUSCULAR; SOFT TISSUE at 22:17

## 2024-09-26 RX ADMIN — METHYLPREDNISOLONE ACETATE 40 MILLIGRAM(S): 80 INJECTION, SUSPENSION INTRA-ARTICULAR; INTRALESIONAL; INTRAMUSCULAR; SOFT TISSUE at 14:00

## 2024-09-26 RX ADMIN — Medication 1 DOSE(S): at 22:20

## 2024-09-26 RX ADMIN — METHYLPREDNISOLONE ACETATE 40 MILLIGRAM(S): 80 INJECTION, SUSPENSION INTRA-ARTICULAR; INTRALESIONAL; INTRAMUSCULAR; SOFT TISSUE at 05:13

## 2024-09-26 RX ADMIN — IPRATROPIUM BROMIDE AND ALBUTEROL SULFATE 3 MILLILITER(S): .5; 3 SOLUTION RESPIRATORY (INHALATION) at 20:10

## 2024-09-26 RX ADMIN — ANASTROZOLE 1 MILLIGRAM(S): 1 TABLET ORAL at 12:38

## 2024-09-26 RX ADMIN — Medication 1 DOSE(S): at 12:38

## 2024-09-26 RX ADMIN — APIXABAN 5 MILLIGRAM(S): 5 TABLET, FILM COATED ORAL at 05:13

## 2024-09-26 RX ADMIN — Medication 12.5 MILLIGRAM(S): at 18:59

## 2024-09-26 RX ADMIN — PANTOPRAZOLE SODIUM 40 MILLIGRAM(S): 40 TABLET, DELAYED RELEASE ORAL at 05:15

## 2024-09-26 RX ADMIN — Medication 2 DROP(S): at 19:02

## 2024-09-26 RX ADMIN — Medication 2 DROP(S): at 05:18

## 2024-09-26 RX ADMIN — IPRATROPIUM BROMIDE AND ALBUTEROL SULFATE 3 MILLILITER(S): .5; 3 SOLUTION RESPIRATORY (INHALATION) at 09:45

## 2024-09-26 RX ADMIN — AZITHROMYCIN 255 MILLIGRAM(S): 250 TABLET, FILM COATED ORAL at 05:19

## 2024-09-26 RX ADMIN — APIXABAN 5 MILLIGRAM(S): 5 TABLET, FILM COATED ORAL at 18:59

## 2024-09-26 RX ADMIN — IPRATROPIUM BROMIDE AND ALBUTEROL SULFATE 3 MILLILITER(S): .5; 3 SOLUTION RESPIRATORY (INHALATION) at 03:02

## 2024-09-26 NOTE — PROGRESS NOTE ADULT - ASSESSMENT
89 F, w/ morbid obesity, COPD (home O2 2L, noncompliant with CPAP, multiple hospitalizations at [facility] for COPD exacerbation under MRN [id number]), HFpEF (EF 55-60%), severe RV HTN, HLD p/w SOB. AB.28/pco2 80/po2 122/ Hco3 38/ base excess is 7.9. CXR: Rt UL infiltrate concerning for PNA. Prominent cardiac silhouette w/ pulmonary venous engorgement but w/o malcom pulmonary edema. Mild subsegmental atelectasis vs scarring in Lt lower lobe. Admitted for hypercapnic respiratory failure likely 2/2 COPD exacerbation. Weaned to 2L NC.    89 F, w/ morbid obesity, COPD (home O2 2L, noncompliant with CPAP, multiple hospitalizations at [facility] for COPD exacerbation under MRN [id number]), HFpEF (EF 55-60%), severe RV HTN, HLD p/w SOB. Admitted for hypercapnic respiratory failure likely 2/2 COPD exacerbation. Weaned to 2L NC. UCx E. Coli. On CTX and azithro for PNA. BCx NGTD @ 24 hrs.

## 2024-09-26 NOTE — PROGRESS NOTE ADULT - ASSESSMENT
89 yo F from home, AAOx0 , HHA 7 hours a day  5 days a week, ambulates with walker, with morbid obesity, COPD (home O2 2L, noncompliant with cpap, multiple hospitalizations at [facility] for COPD exacerbation under MRN [id number]), HFpEF (EF 55-60%), severe RV HTN,Chronic Afib and HLD  presenting with complaint of lethargy,acute respiratory failure with CO2 retention and RUL pneumonia.  1.D/C Tele monitoring.  2.Afib-NOAC,lopressor.  3.RUL pneumonia-abx.  4.COPD and CO2 retention-off Bipap,Pulm f/u,steroids.  5.Pt not in CHF,no need for diuretics.  6.PPI.

## 2024-09-26 NOTE — PROGRESS NOTE ADULT - PROBLEM SELECTOR PLAN 2
hx of COPD, on 2L PRN at home  p/w sob  CXR: Rt UL infiltrate concerning for PNA. Prominent cardiac silhouette with pulmonary venous engorgement but   without malcom pulmonary edema. Mild subsegmental atelectasis versus scarring in the Lt lower lobe.  likely 2/2 to COPD exacerbation provoked by PNA  pulm Dr. Son following  - O2 support as needed, maintain O2 sat 88-92%, avoid over oxygenation  - c/w home CPAP   - solumedrol 40q8, taper down  - duoneb, symbicort, albuterol prn  - azithromycin 500 qd  - incentive spirometer hx of COPD, on 2L PRN at home  - see above

## 2024-09-26 NOTE — PROGRESS NOTE ADULT - SUBJECTIVE AND OBJECTIVE BOX
Patient is a 89y old  Female who presents with a chief complaint of COPD vs CHF exacerbation (26 Sep 2024 07:01)  Lethargic, arousable, laying in bed in NAD. Currently on oxygen supp via NC    INTERVAL HPI/OVERNIGHT EVENTS:      VITAL SIGNS:  T(F): 98 (09-26-24 @ 07:24)  HR: 79 (09-26-24 @ 07:24)  BP: 100/56 (09-26-24 @ 07:24)  RR: 18 (09-26-24 @ 07:24)  SpO2: 94% (09-26-24 @ 07:24)  Wt(kg): --  I&O's Detail    25 Sep 2024 07:01  -  26 Sep 2024 07:00  --------------------------------------------------------  IN:  Total IN: 0 mL    OUT:    Voided (mL): 400 mL  Total OUT: 400 mL    Total NET: -400 mL      26 Sep 2024 07:01  -  26 Sep 2024 10:09  --------------------------------------------------------  IN:    Oral Fluid: 220 mL  Total IN: 220 mL    OUT:  Total OUT: 0 mL    Total NET: 220 mL              REVIEW OF SYSTEMS:    CONSTITUTIONAL:  No fevers, chills, sweats    HEENT:  Eyes:  No diplopia or blurred vision. ENT:  No earache, sore throat or runny nose.    CARDIOVASCULAR:  No pressure, squeezing, tightness, or heaviness about the chest; no palpitations.    RESPIRATORY:  Per HPI    GASTROINTESTINAL:  No abdominal pain, nausea, vomiting or diarrhea.    GENITOURINARY:  No dysuria, frequency or urgency.    NEUROLOGIC:  No paresthesias, fasciculations, seizures or weakness.    PSYCHIATRIC:  No disorder of thought or mood.      PHYSICAL EXAM:    Constitutional: Well developed and nourished  Eyes:Perrla  ENMT: normal  Neck:supple  Respiratory: Occasional rhonchi B/L  Cardiovascular: S1 S2 regular  Gastrointestinal: Soft, Non tender  Extremities: No edema  Vascular:normal  Neurological:Awake, alert,Ox3  Musculoskeletal:Normal      MEDICATIONS  (STANDING):  albuterol    90 MICROgram(s) HFA Inhaler 2 Puff(s) Inhalation every 6 hours  albuterol/ipratropium for Nebulization 3 milliLiter(s) Nebulizer every 6 hours  anastrozole 1 milliGRAM(s) Oral daily  apixaban 5 milliGRAM(s) Oral two times a day  artificial  tears Solution 2 Drop(s) Both EYES four times a day  atorvastatin 10 milliGRAM(s) Oral at bedtime  azithromycin  IVPB 500 milliGRAM(s) IV Intermittent every 24 hours  cefTRIAXone   IVPB 1000 milliGRAM(s) IV Intermittent every 24 hours  fluticasone propionate/ salmeterol 100-50 MICROgram(s) Diskus 1 Dose(s) Inhalation two times a day  methylPREDNISolone sodium succinate Injectable 40 milliGRAM(s) IV Push every 8 hours  metoprolol tartrate 12.5 milliGRAM(s) Oral two times a day  montelukast 10 milliGRAM(s) Oral daily  pantoprazole    Tablet 40 milliGRAM(s) Oral before breakfast    MEDICATIONS  (PRN):      Allergies    Allergy Status Unknown    Intolerances        LABS:                        11.0   11.48 )-----------( 202      ( 26 Sep 2024 05:20 )             35.6     09-26    136  |  98  |  26[H]  ----------------------------<  172[H]  4.4   |  34[H]  |  1.00    Ca    8.4      26 Sep 2024 05:20  Phos  3.6     09-26  Mg     2.1     09-26    TPro  7.5  /  Alb  2.9[L]  /  TBili  1.0  /  DBili  x   /  AST  12  /  ALT  15  /  AlkPhos  114  09-26    PT/INR - ( 24 Sep 2024 17:00 )   PT: 21.6 sec;   INR: 1.86 ratio         PTT - ( 24 Sep 2024 17:00 )  PTT:41.7 sec  Urinalysis Basic - ( 26 Sep 2024 05:20 )    Color: x / Appearance: x / SG: x / pH: x  Gluc: 172 mg/dL / Ketone: x  / Bili: x / Urobili: x   Blood: x / Protein: x / Nitrite: x   Leuk Esterase: x / RBC: x / WBC x   Sq Epi: x / Non Sq Epi: x / Bacteria: x      ABG - ( 25 Sep 2024 04:06 )  pH, Arterial: 7.29  pH, Blood: x     /  pCO2: 74    /  pO2: 73    / HCO3: 36    / Base Excess: 6.3   /  SaO2: 93                CARDIAC MARKERS ( 24 Sep 2024 17:00 )  x     / x     / x     / x     / <1.0 ng/mL      CAPILLARY BLOOD GLUCOSE            RADIOLOGY & ADDITIONAL TESTS:    CXR:  < from: Xray Chest 1 View-PORTABLE IMMEDIATE (Xray Chest 1 View-PORTABLE IMMEDIATE .) (09.24.24 @ 17:22) >    IMPRESSION:  1. Right upper lobe infiltrate concerning for pneumonia.  2. Prominent cardiac silhouette with pulmonary venous engorgement but   without malcom pulmonary edema.  3. Mild subsegmental atelectasis versus scarring in the left lower lobe.      < end of copied text >    Ct scan chest:    ekg;    echo:

## 2024-09-26 NOTE — PROGRESS NOTE ADULT - SUBJECTIVE AND OBJECTIVE BOX
Patient is seen and examined at the bed side, is afebrile. She mentioned feeling better, the Leukocytosis  trended down to normal. The Blood cultures have NGTD and the urine culture grew E.coli.       REVIEW OF SYSTEMS: All other review systems are negative      ALLERGY: Allergy Status Unknown      Vital Signs Last 24 Hrs  T(C): 36.9 (26 Sep 2024 15:10), Max: 37.1 (26 Sep 2024 11:00)  T(F): 98.4 (26 Sep 2024 15:10), Max: 98.7 (26 Sep 2024 11:00)  HR: 78 (26 Sep 2024 15:10) (74 - 84)  BP: 114/65 (26 Sep 2024 15:10) (99/54 - 122/77)  BP(mean): --  RR: 18 (26 Sep 2024 15:10) (18 - 19)  SpO2: 100% (26 Sep 2024 15:10) (92% - 100%)    Parameters below as of 26 Sep 2024 15:10  Patient On (Oxygen Delivery Method): nasal cannula  O2 Flow (L/min): 3      PHYSICAL EXAM:  GENERAL: Not in distress   CHEST/LUNG:  Not using accessory muscles   HEART: s1 and s2 present  ABDOMEN:  Nontender and  Nondistended  EXTREMITIES: No pedal  edema  CNS: Awake and Alert      LABS:                        11.0   11.48 )-----------( 202      ( 26 Sep 2024 05:20 )             35.6                           11.8   15.80 )-----------( 215      ( 25 Sep 2024 07:12 )             38.9         09-26    136  |  98  |  26[H]  ----------------------------<  172[H]  4.4   |  34[H]  |  1.00    Ca    8.4      26 Sep 2024 05:20  Phos  3.6     09-26  Mg     2.1     09-26    TPro  7.5  /  Alb  2.9[L]  /  TBili  1.0  /  DBili  x   /  AST  12  /  ALT  15  /  AlkPhos  114  09-26    09-25    138  |  99  |  17  ----------------------------<  135[H]  4.2   |  34[H]  |  1.00    Ca    8.3[L]      25 Sep 2024 07:12  Phos  4.6     09-25  Mg     1.8     09-25    TPro  7.3  /  Alb  3.0[L]  /  TBili  1.0  /  DBili  x   /  AST  15  /  ALT  14  /  AlkPhos  119  09-25    PT/INR - ( 24 Sep 2024 17:00 )   PT: 21.6 sec;   INR: 1.86 ratio       PTT - ( 24 Sep 2024 17:00 )  PTT:41.7 sec      POCT Blood Glucose.: 165 mg/dL (24 Sep 2024 16:46)  ABG - ( 25 Sep 2024 04:06 )  pH, Arterial: 7.29  pH, Blood: x     /  pCO2: 74    /  pO2: 73    / HCO3: 36    / Base Excess: 6.3   /  SaO2: 93            MEDICATIONS  (STANDING):    albuterol    90 MICROgram(s) HFA Inhaler 2 Puff(s) Inhalation every 6 hours  albuterol/ipratropium for Nebulization 3 milliLiter(s) Nebulizer every 6 hours  anastrozole 1 milliGRAM(s) Oral daily  apixaban 5 milliGRAM(s) Oral two times a day  artificial  tears Solution 2 Drop(s) Both EYES four times a day  atorvastatin 10 milliGRAM(s) Oral at bedtime  azithromycin  IVPB 500 milliGRAM(s) IV Intermittent every 24 hours  cefTRIAXone   IVPB 1000 milliGRAM(s) IV Intermittent every 24 hours  fluticasone propionate/ salmeterol 100-50 MICROgram(s) Diskus 1 Dose(s) Inhalation two times a day  methylPREDNISolone sodium succinate Injectable 40 milliGRAM(s) IV Push every 8 hours  metoprolol tartrate 12.5 milliGRAM(s) Oral two times a day  montelukast 10 milliGRAM(s) Oral daily  pantoprazole    Tablet 40 milliGRAM(s) Oral before breakfast        RADIOLOGY & ADDITIONAL TESTS:    9/24/24: Xray Chest 1 View-PORTABLE IMMEDIATE (Xray Chest 1 View-PORTABLE IMMEDIATE .) (09.24.24 @ 17:22) >  1. Right upper lobe infiltrate concerning for pneumonia.  2. Prominent cardiac silhouette with pulmonary venous engorgement but without malcom pulmonary edema.  3. Mild subsegmental atelectasis versus scarring in the left lower lobe.      MICROBIOLOGY DATA:    Legionella pneumophila Antigen, Urine (09.25.24 @ 07:21)   Legionella Antigen, Urine: Negative    Culture - Urine (09.24.24 @ 21:30)   Specimen Source: Clean Catch  Culture Results:   10,000 - 49,000 CFU/mL Escherichia coli   <10,000 CFU/ml Normal Urogenital cas present    Culture - Blood (09.24.24 @ 17:00)   Specimen Source: .Blood Blood-Peripheral  Culture Results: No growth at 24 hours    Culture - Blood (09.24.24 @ 16:50)   Specimen Source: .Blood Blood-Peripheral  Culture Results: No growth at 24 hours    Urine Microscopic-Add On (NC) (09.24.24 @ 21:30)   Red Blood Cell - Urine: 8 /HPF  White Blood Cell - Urine: 25 /HPF  Bacteria: Moderate /HPF  Squamous Epithelial Cells: Present

## 2024-09-26 NOTE — PROGRESS NOTE ADULT - PROBLEM SELECTOR PLAN 4
hx of CHF with last echo 2022, EF 56%  likely not the cause of the patients AHRF but still possible   CXR: Rt UL infiltrate concerning for PNA. Prominent cardiac silhouette with pulmonary venous engorgement but   without malcom pulmonary edema. Mild subsegmental atelectasis versus scarring in the Lt lower lobe.  BNP 1147, trop neg  s/p 60 mg of lasix  cardio Dr. Siu following  pt not in CHF as per cardio, no need for diuretics hx of CHF with last echo 2022, EF 56%  likely not the cause of the patients AHRF but still possible   CXR: Rt UL infiltrate concerning for PNA. Prominent cardiac silhouette with pulmonary venous engorgement but   without malcom pulmonary edema. Mild subsegmental atelectasis versus scarring in the Lt lower lobe.  BNP 1147, trop neg  s/p 60 mg of lasix  cardio Dr. iSu following  pt not in CHF as per cardio, no need for diuretics  - monitor volume status

## 2024-09-26 NOTE — PROGRESS NOTE ADULT - SUBJECTIVE AND OBJECTIVE BOX
Patient was seen and examined  Patient is a 89y old  Female who presents with a chief complaint of COPD vs CHF exacerbation (25 Sep 2024 16:00)      INTERVAL HPI/OVERNIGHT EVENTS:  T(C): 37 (09-26-24 @ 04:35), Max: 37.7 (09-25-24 @ 11:42)  HR: 84 (09-26-24 @ 04:35) (58 - 84)  BP: 99/54 (09-26-24 @ 04:35) (99/54 - 110/74)  RR: 19 (09-26-24 @ 04:35) (19 - 19)  SpO2: 93% (09-26-24 @ 04:35) (92% - 98%)  Wt(kg): --  I&O's Summary    25 Sep 2024 07:01  -  26 Sep 2024 07:00  --------------------------------------------------------  IN: 0 mL / OUT: 400 mL / NET: -400 mL        LABS:                        11.0   11.48 )-----------( 202      ( 26 Sep 2024 05:20 )             35.6     09-26    136  |  98  |  26[H]  ----------------------------<  172[H]  4.4   |  34[H]  |  1.00    Ca    8.4      26 Sep 2024 05:20  Phos  3.6     09-26  Mg     2.1     09-26    TPro  7.5  /  Alb  2.9[L]  /  TBili  1.0  /  DBili  x   /  AST  12  /  ALT  15  /  AlkPhos  114  09-26    PT/INR - ( 24 Sep 2024 17:00 )   PT: 21.6 sec;   INR: 1.86 ratio         PTT - ( 24 Sep 2024 17:00 )  PTT:41.7 sec  Urinalysis Basic - ( 26 Sep 2024 05:20 )    Color: x / Appearance: x / SG: x / pH: x  Gluc: 172 mg/dL / Ketone: x  / Bili: x / Urobili: x   Blood: x / Protein: x / Nitrite: x   Leuk Esterase: x / RBC: x / WBC x   Sq Epi: x / Non Sq Epi: x / Bacteria: x      CAPILLARY BLOOD GLUCOSE        LIPID PANEL  Cholesterol 136  LDL --  HDL 66  RATIO HDL/Total Cholesterol --  Triglyceride 46      ABG - ( 25 Sep 2024 04:06 )  pH, Arterial: 7.29  pH, Blood: x     /  pCO2: 74    /  pO2: 73    / HCO3: 36    / Base Excess: 6.3   /  SaO2: 93                Urinalysis Basic - ( 26 Sep 2024 05:20 )    Color: x / Appearance: x / SG: x / pH: x  Gluc: 172 mg/dL / Ketone: x  / Bili: x / Urobili: x   Blood: x / Protein: x / Nitrite: x   Leuk Esterase: x / RBC: x / WBC x   Sq Epi: x / Non Sq Epi: x / Bacteria: x        MEDICATIONS  (STANDING):  albuterol    90 MICROgram(s) HFA Inhaler 2 Puff(s) Inhalation every 6 hours  albuterol/ipratropium for Nebulization 3 milliLiter(s) Nebulizer every 6 hours  anastrozole 1 milliGRAM(s) Oral daily  apixaban 5 milliGRAM(s) Oral two times a day  artificial  tears Solution 2 Drop(s) Both EYES four times a day  atorvastatin 10 milliGRAM(s) Oral at bedtime  azithromycin  IVPB 500 milliGRAM(s) IV Intermittent every 24 hours  cefTRIAXone   IVPB 1000 milliGRAM(s) IV Intermittent every 24 hours  fluticasone propionate/ salmeterol 100-50 MICROgram(s) Diskus 1 Dose(s) Inhalation two times a day  methylPREDNISolone sodium succinate Injectable 40 milliGRAM(s) IV Push every 8 hours  metoprolol tartrate 12.5 milliGRAM(s) Oral two times a day  montelukast 10 milliGRAM(s) Oral daily  pantoprazole    Tablet 40 milliGRAM(s) Oral before breakfast    MEDICATIONS  (PRN):      RADIOLOGY & ADDITIONAL TESTS:    Imaging Personally Reviewed:  [ ] YES  [ ] NO    REVIEW OF SYSTEMS:  CONSTITUTIONAL: No fever, weight loss, or fatigue  EYES: No eye pain, visual disturbances, or discharge  ENMT:  No difficulty hearing, tinnitus, vertigo; No sinus or throat pain  NECK: No pain or stiffness  BREASTS: No pain, masses, or nipple discharge  RESPIRATORY: No cough, wheezing, chills or hemoptysis; No shortness of breath  CARDIOVASCULAR: No chest pain, palpitations, dizziness, or leg swelling  GASTROINTESTINAL: No abdominal or epigastric pain. No nausea, vomiting, or hematemesis; No diarrhea or constipation. No melena or hematochezia.  GENITOURINARY: No dysuria, frequency, hematuria, or incontinence  NEUROLOGICAL: No headaches, memory loss, loss of strength, numbness, or tremors  SKIN: No itching, burning, rashes, or lesions   LYMPH NODES: No enlarged glands  ENDOCRINE: No heat or cold intolerance; No hair loss  MUSCULOSKELETAL: No joint pain or swelling; No muscle, back, or extremity pain  PSYCHIATRIC: No depression, anxiety, mood swings, or difficulty sleeping  HEME/LYMPH: No easy bruising, or bleeding gums  ALLERY AND IMMUNOLOGIC: No hives or eczema      Consultant(s) Notes Reviewed:  [ x ] YES  [ ] NO    PHYSICAL EXAM:  GENERAL: NAD, well-groomed, well-developed  HEAD:  Atraumatic, Normocephalic  EYES: EOMI, PERRLA, conjunctiva and sclera clear  ENMT: No tonsillar erythema, exudates, or enlargement; Moist mucous membranes, Good dentition, No lesions  NECK: Supple, No JVD, Normal thyroid  NERVOUS SYSTEM:  Alert & Oriented X3, Good concentration; Motor Strength 5/5 B/L upper and lower extremities; DTRs 2+ intact and symmetric  CHEST/LUNG: Clear to percussion bilaterally; No rales, rhonchi, wheezing, or rubs  HEART: Regular rate and rhythm; No murmurs, rubs, or gallops  ABDOMEN: Soft, Nontender, Nondistended; Bowel sounds present  EXTREMITIES:  2+ Peripheral Pulses, No clubbing, cyanosis, or edema  LYMPH: No lymphadenopathy noted  SKIN: No rashes or lesions    Care Discussed with Consultants/Other Providers [ x] YES  [ ] NO

## 2024-09-26 NOTE — PROGRESS NOTE ADULT - PROBLEM SELECTOR PLAN 6
hx of htn on carvedilol  -c/w lopressor as per cardio hx of htn on carvedilol  - c/w lopressor as per cardio  - monitor for hemodynamic instability

## 2024-09-26 NOTE — PROGRESS NOTE ADULT - ASSESSMENT
Patient is a 89y old  Female who is from home, AAOx0 , HHA 7 hours a day  5 days a week, ambulates with walker, with morbid obesity, COPD (home O2 2L, noncompliant with cpap, multiple hospitalizations at [facility] for COPD exacerbation under MRN [id number]), HFpEF (EF 55-60%), severe RV HTN and HLD, now presents to the ER for evaluation of lethargy. Collateral info obtained from granddaughter and daughter Sylvia at bedside. Sylvia lives with the patient. Say that pt was in her usual state of health , had a drink of kenyon and was feeling well, until about 3pm on day of presentation when she started saying she was feeling unwell. They took her oxygen at home and it was 50s and then went back up to 80s They called EMS, she was put on CPAP and given 2 doses of SL nitro. In the ED pt was lethargic, encephalopathic, minimally conversant. As per family this is not her baseline. Family admits that pt has not been on her home cpap in a "very long time." On admission, she found to have no fever, but positive Urine analysis and RUL infiltrate. She has started on Ceftriaxone and Azithromycin, and the ID consult requested to assist with further evaluation and antibiotic management.    # Pneumonia - Legionella urine Ag is negative  # Acute Respiratory Failure - on BIPAP  # UTI - Urine cx grew E.coli    would recommend:    1. Follow up annabelle Urine culture for the sensitivities of the Isolates  2. Aspiration precaution  3. Supplemental oxygenation and bronchodilator as needed  4. Continue Ceftriaxone and Azithromycin until work up is done      Attending Attestation:     Spent more than 45 minutes on total encounter, more than 50 % of the visit was spent counseling and/or coordinating care by the Attending physician.

## 2024-09-26 NOTE — PROGRESS NOTE ADULT - PROBLEM SELECTOR PLAN 3
p/w SOB and found to have hypercapnic respiratory failure   CXR: Rt UL infiltrate concerning for PNA. Prominent cardiac silhouette with pulmonary venous engorgement but   without malcom pulmonary edema. Mild subsegmental atelectasis versus scarring in the Lt lower lobe.  s/p rocephin and azithromycin   RVP neg  ID Dr. Denton consulted, pending recs  strep neg  - c/w azithromycin 500mg qd + rocephin 1g qd  - f/u legionella, mycoplasma  - f/u BCx p/w SOB and found to have hypercapnic resp failure   CXR: Rt UL infiltrate concerning for PNA. Prominent cardiac silhouette with pulmonary venous engorgement but   without malcom pulmonary edema. Mild subsegmental atelectasis versus scarring in the Lt lower lobe.  s/p rocephin and azithromycin   RVP neg  ID Dr. Denton following  strep neg, legionella neg  BCx neg NGTD @ 24 hrs  WBC improving  - c/w azithro 500mg qd + rocephin 1g qd  - f/u mycoplasma

## 2024-09-26 NOTE — PROGRESS NOTE ADULT - PROBLEM SELECTOR PLAN 1
p/w SOB  lethargy requiring BIPAP   pro-bnp 1147   ABG indicative of chronic hypercapnia  UA+  CXR: Rt UL infiltrate concerning for PNA. Prominent cardiac silhouette with pulmonary venous engorgement but   without malcom pulmonary edema. Mild subsegmental atelectasis versus scarring in the Lt lower lobe.  likely 2/2 to COPD exacerbation provoked by PNA  pulm Dr. Son following  weaned to 2L NC p/w SOB, lethargy requiring BIPAP   pro-bnp neg  ABG indicative of chronic hypercapnia  RVP neg  CXR: Rt UL infiltrate concerning for PNA. Prominent cardiac silhouette with pulmonary venous engorgement but   without malcom pulmonary edema. Mild subsegmental atelectasis versus scarring in the Lt lower lobe.  likely 2/2 to COPD exacerbation provoked by PNA  pulm Dr. Son following  weaned to 2L NC  - O2 support as needed, maintain O2 sat 88-92%, avoid over oxygenation  - c/w home CPAP   - duoneb q6h, albuterol q6h  - incentive spirometer  - c/w azithro 500mg qd + rocephin 1g qd

## 2024-09-26 NOTE — PROGRESS NOTE ADULT - SUBJECTIVE AND OBJECTIVE BOX
Date of Service 09-26-24 @ 10:53    CHIEF COMPLAINT:Patient is a 89y old  Female who presents with a chief complaint of COPD vs CHF exacerbation.Pt appears comfortable.    	  REVIEW OF SYSTEMS:  CONSTITUTIONAL: No fever, weight loss, or fatigue  EYES: No eye pain, visual disturbances, or discharge  ENT:  No difficulty hearing, tinnitus, vertigo; No sinus or throat pain  NECK: No pain or stiffness  RESPIRATORY: No cough, wheezing, chills or hemoptysis; No Shortness of Breath  CARDIOVASCULAR: No chest pain, palpitations, passing out, dizziness, or leg swelling  GASTROINTESTINAL: No abdominal or epigastric pain. No nausea, vomiting, or hematemesis; No diarrhea or constipation. No melena or hematochezia.  GENITOURINARY: No dysuria, frequency, hematuria, or incontinence  NEUROLOGICAL: No headaches, memory loss, loss of strength, numbness, or tremors  SKIN: No itching, burning, rashes, or lesions   LYMPH Nodes: No enlarged glands  ENDOCRINE: No heat or cold intolerance; No hair loss  MUSCULOSKELETAL: No joint pain or swelling; No muscle, back, or extremity pain  PSYCHIATRIC: No depression, anxiety, mood swings, or difficulty sleeping  HEME/LYMPH: No easy bruising, or bleeding gums  ALLERGY AND IMMUNOLOGIC: No hives or eczema	        PHYSICAL EXAM:  T(C): 36.7 (09-26-24 @ 07:24), Max: 37.7 (09-25-24 @ 11:42)  HR: 79 (09-26-24 @ 07:24) (72 - 84)  BP: 100/56 (09-26-24 @ 07:24) (99/54 - 110/74)  RR: 18 (09-26-24 @ 07:24) (18 - 19)  SpO2: 94% (09-26-24 @ 07:24) (92% - 98%)  Wt(kg): --  I&O's Summary    25 Sep 2024 07:01  -  26 Sep 2024 07:00  --------------------------------------------------------  IN: 0 mL / OUT: 400 mL / NET: -400 mL    26 Sep 2024 07:01  -  26 Sep 2024 10:53  --------------------------------------------------------  IN: 220 mL / OUT: 0 mL / NET: 220 mL        Appearance: Normal	  HEENT:   Normal oral mucosa, PERRL, EOMI	  Lymphatic: No lymphadenopathy  Cardiovascular: Normal S1 S2, No JVD, No murmurs, No edema  Respiratory: Lungs clear to auscultation	  Gastrointestinal:  Soft, Non-tender, + BS	  Skin: No rashes, No ecchymoses, No cyanosis	  Neurologic: Non-focal  Extremities: Normal range of motion, No clubbing, cyanosis or edema  Vascular: Peripheral pulses palpable 2+ bilaterally    MEDICATIONS  (STANDING):  albuterol    90 MICROgram(s) HFA Inhaler 2 Puff(s) Inhalation every 6 hours  albuterol/ipratropium for Nebulization 3 milliLiter(s) Nebulizer every 6 hours  anastrozole 1 milliGRAM(s) Oral daily  apixaban 5 milliGRAM(s) Oral two times a day  artificial  tears Solution 2 Drop(s) Both EYES four times a day  atorvastatin 10 milliGRAM(s) Oral at bedtime  azithromycin  IVPB 500 milliGRAM(s) IV Intermittent every 24 hours  cefTRIAXone   IVPB 1000 milliGRAM(s) IV Intermittent every 24 hours  fluticasone propionate/ salmeterol 100-50 MICROgram(s) Diskus 1 Dose(s) Inhalation two times a day  methylPREDNISolone sodium succinate Injectable 40 milliGRAM(s) IV Push every 8 hours  metoprolol tartrate 12.5 milliGRAM(s) Oral two times a day  montelukast 10 milliGRAM(s) Oral daily  pantoprazole    Tablet 40 milliGRAM(s) Oral before breakfast      TELEMETRY: afib 50-80's	     	  	  LABS:	 	    CARDIAC MARKERS:  CARDIAC MARKERS ( 24 Sep 2024 17:00 )  x     / x     / x     / x     / <1.0 ng/mL      Troponin I, High Sensitivity Result: 46.6 ng/L (09-24 @ 17:00)                            11.0   11.48 )-----------( 202      ( 26 Sep 2024 05:20 )             35.6     09-26    136  |  98  |  26[H]  ----------------------------<  172[H]  4.4   |  34[H]  |  1.00    Ca    8.4      26 Sep 2024 05:20  Phos  3.6     09-26  Mg     2.1     09-26    TPro  7.5  /  Alb  2.9[L]  /  TBili  1.0  /  DBili  x   /  AST  12  /  ALT  15  /  AlkPhos  114  09-26    proBNP:   Lipid Profile: Cholesterol 136  LDL --  HDL 66  TG 46  Ldl calc 61  Ratio --

## 2024-09-26 NOTE — PROGRESS NOTE ADULT - PROBLEM SELECTOR PLAN 5
UA +ve  pt asymptomatic  - c/w rocephin for now   - f/u UCx UA +ve  UCx E. coli  pt asymptomatic  - c/w rocephin

## 2024-09-27 LAB
ALBUMIN SERPL ELPH-MCNC: 3.2 G/DL — LOW (ref 3.5–5)
ALP SERPL-CCNC: 120 U/L — SIGNIFICANT CHANGE UP (ref 40–120)
ALT FLD-CCNC: 25 U/L DA — SIGNIFICANT CHANGE UP (ref 10–60)
ANION GAP SERPL CALC-SCNC: 3 MMOL/L — LOW (ref 5–17)
AST SERPL-CCNC: 22 U/L — SIGNIFICANT CHANGE UP (ref 10–40)
BILIRUB SERPL-MCNC: 1.1 MG/DL — SIGNIFICANT CHANGE UP (ref 0.2–1.2)
BUN SERPL-MCNC: 31 MG/DL — HIGH (ref 7–18)
CALCIUM SERPL-MCNC: 9.2 MG/DL — SIGNIFICANT CHANGE UP (ref 8.4–10.5)
CHLORIDE SERPL-SCNC: 99 MMOL/L — SIGNIFICANT CHANGE UP (ref 96–108)
CO2 SERPL-SCNC: 34 MMOL/L — HIGH (ref 22–31)
CREAT SERPL-MCNC: 0.92 MG/DL — SIGNIFICANT CHANGE UP (ref 0.5–1.3)
EGFR: 58 ML/MIN/1.73M2 — LOW
GLUCOSE SERPL-MCNC: 165 MG/DL — HIGH (ref 70–99)
HCT VFR BLD CALC: 37.6 % — SIGNIFICANT CHANGE UP (ref 34.5–45)
HGB BLD-MCNC: 11.7 G/DL — SIGNIFICANT CHANGE UP (ref 11.5–15.5)
MAGNESIUM SERPL-MCNC: 2.4 MG/DL — SIGNIFICANT CHANGE UP (ref 1.6–2.6)
MCHC RBC-ENTMCNC: 30.2 PG — SIGNIFICANT CHANGE UP (ref 27–34)
MCHC RBC-ENTMCNC: 31.1 GM/DL — LOW (ref 32–36)
MCV RBC AUTO: 97.2 FL — SIGNIFICANT CHANGE UP (ref 80–100)
MRSA PCR RESULT.: SIGNIFICANT CHANGE UP
NRBC # BLD: 0 /100 WBCS — SIGNIFICANT CHANGE UP (ref 0–0)
PHOSPHATE SERPL-MCNC: 2.9 MG/DL — SIGNIFICANT CHANGE UP (ref 2.5–4.5)
PLATELET # BLD AUTO: 211 K/UL — SIGNIFICANT CHANGE UP (ref 150–400)
POTASSIUM SERPL-MCNC: 4.5 MMOL/L — SIGNIFICANT CHANGE UP (ref 3.5–5.3)
POTASSIUM SERPL-SCNC: 4.5 MMOL/L — SIGNIFICANT CHANGE UP (ref 3.5–5.3)
PROT SERPL-MCNC: 8 G/DL — SIGNIFICANT CHANGE UP (ref 6–8.3)
RBC # BLD: 3.87 M/UL — SIGNIFICANT CHANGE UP (ref 3.8–5.2)
RBC # FLD: 14.7 % — HIGH (ref 10.3–14.5)
S AUREUS DNA NOSE QL NAA+PROBE: SIGNIFICANT CHANGE UP
SODIUM SERPL-SCNC: 136 MMOL/L — SIGNIFICANT CHANGE UP (ref 135–145)
WBC # BLD: 12.19 K/UL — HIGH (ref 3.8–10.5)
WBC # FLD AUTO: 12.19 K/UL — HIGH (ref 3.8–10.5)

## 2024-09-27 RX ORDER — METHYLPREDNISOLONE ACETATE 80 MG/ML
40 INJECTION, SUSPENSION INTRA-ARTICULAR; INTRALESIONAL; INTRAMUSCULAR; SOFT TISSUE EVERY 12 HOURS
Refills: 0 | Status: DISCONTINUED | OUTPATIENT
Start: 2024-09-27 | End: 2024-09-30

## 2024-09-27 RX ADMIN — Medication 2 DROP(S): at 00:04

## 2024-09-27 RX ADMIN — Medication 100 MILLIGRAM(S): at 06:04

## 2024-09-27 RX ADMIN — IPRATROPIUM BROMIDE AND ALBUTEROL SULFATE 3 MILLILITER(S): .5; 3 SOLUTION RESPIRATORY (INHALATION) at 08:04

## 2024-09-27 RX ADMIN — Medication 2 DROP(S): at 12:55

## 2024-09-27 RX ADMIN — AZITHROMYCIN 255 MILLIGRAM(S): 250 TABLET, FILM COATED ORAL at 06:05

## 2024-09-27 RX ADMIN — IPRATROPIUM BROMIDE AND ALBUTEROL SULFATE 3 MILLILITER(S): .5; 3 SOLUTION RESPIRATORY (INHALATION) at 21:18

## 2024-09-27 RX ADMIN — IPRATROPIUM BROMIDE AND ALBUTEROL SULFATE 3 MILLILITER(S): .5; 3 SOLUTION RESPIRATORY (INHALATION) at 02:29

## 2024-09-27 RX ADMIN — METHYLPREDNISOLONE ACETATE 40 MILLIGRAM(S): 80 INJECTION, SUSPENSION INTRA-ARTICULAR; INTRALESIONAL; INTRAMUSCULAR; SOFT TISSUE at 06:03

## 2024-09-27 RX ADMIN — PANTOPRAZOLE SODIUM 40 MILLIGRAM(S): 40 TABLET, DELAYED RELEASE ORAL at 06:03

## 2024-09-27 RX ADMIN — ATORVASTATIN CALCIUM 10 MILLIGRAM(S): 10 TABLET, FILM COATED ORAL at 21:38

## 2024-09-27 RX ADMIN — METHYLPREDNISOLONE ACETATE 40 MILLIGRAM(S): 80 INJECTION, SUSPENSION INTRA-ARTICULAR; INTRALESIONAL; INTRAMUSCULAR; SOFT TISSUE at 17:55

## 2024-09-27 RX ADMIN — Medication 2 DROP(S): at 17:54

## 2024-09-27 RX ADMIN — APIXABAN 5 MILLIGRAM(S): 5 TABLET, FILM COATED ORAL at 06:03

## 2024-09-27 RX ADMIN — MONTELUKAST SODIUM 10 MILLIGRAM(S): 10 TABLET, FILM COATED ORAL at 12:54

## 2024-09-27 RX ADMIN — Medication 1 DOSE(S): at 21:38

## 2024-09-27 RX ADMIN — ANASTROZOLE 1 MILLIGRAM(S): 1 TABLET ORAL at 12:55

## 2024-09-27 RX ADMIN — Medication 12.5 MILLIGRAM(S): at 17:54

## 2024-09-27 RX ADMIN — APIXABAN 5 MILLIGRAM(S): 5 TABLET, FILM COATED ORAL at 17:54

## 2024-09-27 RX ADMIN — Medication 1 DOSE(S): at 12:54

## 2024-09-27 RX ADMIN — Medication 12.5 MILLIGRAM(S): at 06:05

## 2024-09-27 RX ADMIN — IPRATROPIUM BROMIDE AND ALBUTEROL SULFATE 3 MILLILITER(S): .5; 3 SOLUTION RESPIRATORY (INHALATION) at 15:26

## 2024-09-27 RX ADMIN — Medication 2 DROP(S): at 06:03

## 2024-09-27 NOTE — DIETITIAN INITIAL EVALUATION ADULT - PROBLEM SELECTOR PLAN 4
hx of CHF with last echo 2 years ago showing ef 56%  likely not the cause of the patients AHRF but is still possible   CXR shows: . Prominent cardiac silhouette with pulmonary venous engorgement but   without malcom pulmonary edema  BNP 1147  trop neg  admit to tele   s/p 60 mg of lasix  hold further lasix for now   -f/u echo  -daily weights, strict I&O  -Cardio Consult in AM

## 2024-09-27 NOTE — PROGRESS NOTE ADULT - ASSESSMENT
89 yo F from home, AAOx0 , HHA 7 hours a day  5 days a week, ambulates with walker, with morbid obesity, COPD (home O2 2L, noncompliant with cpap, multiple hospitalizations at [facility] for COPD exacerbation under MRN [id number]), HFpEF (EF 55-60%), severe RV HTN,Chronic Afib and HLD  presenting with complaint of lethargy,acute respiratory failure with CO2 retention and RUL pneumonia.  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  2.Afib-NOAC,lopressor.  3.RUL pneumonia-abx.  4.COPD and CO2 retention-off Bipap,Pulm f/u,steroids.  5.Pt not in CHF,no need for diuretics.  6.PPI.

## 2024-09-27 NOTE — PROGRESS NOTE ADULT - SUBJECTIVE AND OBJECTIVE BOX
Patient is seen and examined at the bed side, is afebrile. No over night events. The urine culture not finalized yet.       REVIEW OF SYSTEMS: All other review systems are negative      ALLERGY: Allergy Status Unknown      Vital Signs Last 24 Hrs  T(C): 36.8 (27 Sep 2024 07:14), Max: 37.1 (26 Sep 2024 11:00)  T(F): 98.2 (27 Sep 2024 07:14), Max: 98.7 (26 Sep 2024 11:00)  HR: 77 (27 Sep 2024 07:14) (77 - 87)  BP: 128/78 (27 Sep 2024 07:14) (114/65 - 139/68)  BP(mean): --  RR: 18 (27 Sep 2024 07:14) (18 - 18)  SpO2: 100% (27 Sep 2024 07:14) (96% - 100%)    Parameters below as of 27 Sep 2024 07:14  Patient On (Oxygen Delivery Method): nasal cannula  O2 Flow (L/min): 3      PHYSICAL EXAM:  GENERAL: Not in distress   CHEST/LUNG:  Not using accessory muscles   HEART: s1 and s2 present  ABDOMEN:  Nontender and  Nondistended  EXTREMITIES: No pedal  edema  CNS: Awake and Alert      LABS:                        11.7   12.19 )-----------( 211      ( 27 Sep 2024 06:25 )             37.6                           11.8   15.80 )-----------( 215      ( 25 Sep 2024 07:12 )             38.9       09-27    136  |  99  |  31[H]  ----------------------------<  165[H]  4.5   |  34[H]  |  0.92    Ca    9.2      27 Sep 2024 06:25  Phos  2.9     09-27  Mg     2.4     09-27    TPro  8.0  /  Alb  3.2[L]  /  TBili  1.1  /  DBili  x   /  AST  22  /  ALT  25  /  AlkPhos  120  09-27 09-26    136  |  98  |  26[H]  ----------------------------<  172[H]  4.4   |  34[H]  |  1.00    Ca    8.4      26 Sep 2024 05:20  Phos  3.6     09-26  Mg     2.1     09-26    TPro  7.5  /  Alb  2.9[L]  /  TBili  1.0  /  DBili  x   /  AST  12  /  ALT  15  /  AlkPhos  114  09-26    PT/INR - ( 24 Sep 2024 17:00 )   PT: 21.6 sec;   INR: 1.86 ratio       PTT - ( 24 Sep 2024 17:00 )  PTT:41.7 sec      POCT Blood Glucose.: 165 mg/dL (24 Sep 2024 16:46)  ABG - ( 25 Sep 2024 04:06 )  pH, Arterial: 7.29  pH, Blood: x     /  pCO2: 74    /  pO2: 73    / HCO3: 36    / Base Excess: 6.3   /  SaO2: 93            MEDICATIONS  (STANDING):    albuterol    90 MICROgram(s) HFA Inhaler 2 Puff(s) Inhalation every 6 hours  albuterol/ipratropium for Nebulization 3 milliLiter(s) Nebulizer every 6 hours  anastrozole 1 milliGRAM(s) Oral daily  apixaban 5 milliGRAM(s) Oral two times a day  artificial  tears Solution 2 Drop(s) Both EYES four times a day  atorvastatin 10 milliGRAM(s) Oral at bedtime  azithromycin  IVPB 500 milliGRAM(s) IV Intermittent every 24 hours  cefTRIAXone   IVPB 1000 milliGRAM(s) IV Intermittent every 24 hours  fluticasone propionate/ salmeterol 100-50 MICROgram(s) Diskus 1 Dose(s) Inhalation two times a day  methylPREDNISolone sodium succinate Injectable 40 milliGRAM(s) IV Push every 12 hours  metoprolol tartrate 12.5 milliGRAM(s) Oral two times a day  montelukast 10 milliGRAM(s) Oral daily  pantoprazole    Tablet 40 milliGRAM(s) Oral before breakfast        RADIOLOGY & ADDITIONAL TESTS:    9/24/24: Xray Chest 1 View-PORTABLE IMMEDIATE (Xray Chest 1 View-PORTABLE IMMEDIATE .) (09.24.24 @ 17:22) >  1. Right upper lobe infiltrate concerning for pneumonia.  2. Prominent cardiac silhouette with pulmonary venous engorgement but without malcom pulmonary edema.  3. Mild subsegmental atelectasis versus scarring in the left lower lobe.      MICROBIOLOGY DATA:    Legionella pneumophila Antigen, Urine (09.25.24 @ 07:21)   Legionella Antigen, Urine: Negative    Culture - Urine (09.24.24 @ 21:30)   Specimen Source: Clean Catch  Culture Results:   10,000 - 49,000 CFU/mL Escherichia coli   <10,000 CFU/ml Normal Urogenital cas present    Culture - Blood (09.24.24 @ 17:00)   Specimen Source: .Blood Blood-Peripheral  Culture Results: No growth at 24 hours    Culture - Blood (09.24.24 @ 16:50)   Specimen Source: .Blood Blood-Peripheral  Culture Results: No growth at 24 hours    Urine Microscopic-Add On (NC) (09.24.24 @ 21:30)   Red Blood Cell - Urine: 8 /HPF  White Blood Cell - Urine: 25 /HPF  Bacteria: Moderate /HPF  Squamous Epithelial Cells: Present

## 2024-09-27 NOTE — PROGRESS NOTE ADULT - ASSESSMENT
89 F, w/ morbid obesity, COPD (home O2 2L, noncompliant with CPAP, multiple hospitalizations at [facility] for COPD exacerbation under MRN [id number]), HFpEF (EF 55-60%), severe RV HTN, HLD p/w SOB. Admitted for hypercapnic respiratory failure likely 2/2 COPD exacerbation. Weaned to 2L NC. UCx E. Coli. On CTX and azithro for PNA. BCx NGTD @ 24 hrs.

## 2024-09-27 NOTE — DIETITIAN INITIAL EVALUATION ADULT - FEEDING ASSISTANCE
Nursing to continue feeding assistance and encouragement; Provide food choices within diet Rx as available/updated; Assist by Diet Technician for food preferences/daily menu selection

## 2024-09-27 NOTE — DIETITIAN INITIAL EVALUATION ADULT - OTHER INFO
Pt lives home with family PTA, alert, confused, Limited intake/wt change history data available at present; Pt lives home with family, HHA help PTA, bedbound, alert, confused, contacted family ( granddaughter Mirela at [phone]), well-communicated; Reported Ht=5' 9", appetite good at home on regular/soft food, unsure recent wt changes, denied GI distress, chewing or swallowing problem; Unknown food allergy, food choices obtained and forwarded to Dietary, Food&Nutrition # given, encouraged assistance by Diet Tech for daily menu selection/food choices update; 26-50% breakfast intake observed with total feeding assistance due to impaired vision, intake may vary depending on food and how pt feels; family also brining home made food, not interested in oral nutritional supplement at present

## 2024-09-27 NOTE — DIETITIAN INITIAL EVALUATION ADULT - FACTORS AFF FOOD INTAKE
advanced age with acute on chronic comorbidities including acute hypoxic respiratory failure, CHF, COPD/change in mental status/[employer]/ethnic/cultural/personal food preferences advanced age with acute on chronic comorbidities including acute hypoxic respiratory failure, CHF, COPD, impaired vision/change in mental status/[employer]/ethnic/cultural/personal food preferences

## 2024-09-27 NOTE — PROGRESS NOTE ADULT - SUBJECTIVE AND OBJECTIVE BOX
Patient is a 89y old  Female who presents with a chief complaint of Heart failure  Awake, alert, comfortable in bed in NAD. Doing well with oxygen supp via NC   (27 Sep 2024 09:27)      INTERVAL HPI/OVERNIGHT EVENTS:      VITAL SIGNS:  T(F): 98.2 (09-27-24 @ 07:14)  HR: 77 (09-27-24 @ 07:14)  BP: 128/78 (09-27-24 @ 07:14)  RR: 18 (09-27-24 @ 07:14)  SpO2: 100% (09-27-24 @ 07:14)  Wt(kg): --  I&O's Detail    26 Sep 2024 07:01  -  27 Sep 2024 07:00  --------------------------------------------------------  IN:    Oral Fluid: 450 mL  Total IN: 450 mL    OUT:    Voided (mL): 1200 mL  Total OUT: 1200 mL    Total NET: -750 mL              REVIEW OF SYSTEMS:    CONSTITUTIONAL:  No fevers, chills, sweats    HEENT:  Eyes:  No diplopia or blurred vision. ENT:  No earache, sore throat or runny nose.    CARDIOVASCULAR:  No pressure, squeezing, tightness, or heaviness about the chest; no palpitations.    RESPIRATORY:  Per HPI    GASTROINTESTINAL:  No abdominal pain, nausea, vomiting or diarrhea.    GENITOURINARY:  No dysuria, frequency or urgency.    NEUROLOGIC:  No paresthesias, fasciculations, seizures or weakness.    PSYCHIATRIC:  No disorder of thought or mood.      PHYSICAL EXAM:    Constitutional: Well developed and nourished  Eyes:Perrla  ENMT: normal  Neck:supple  Respiratory: good air entry  Cardiovascular: S1 S2 regular  Gastrointestinal: Soft, Non tender  Extremities: No edema  Vascular:normal  Neurological:Awake, alert,Ox3  Musculoskeletal:Normal      MEDICATIONS  (STANDING):  albuterol    90 MICROgram(s) HFA Inhaler 2 Puff(s) Inhalation every 6 hours  albuterol/ipratropium for Nebulization 3 milliLiter(s) Nebulizer every 6 hours  anastrozole 1 milliGRAM(s) Oral daily  apixaban 5 milliGRAM(s) Oral two times a day  artificial  tears Solution 2 Drop(s) Both EYES four times a day  atorvastatin 10 milliGRAM(s) Oral at bedtime  azithromycin  IVPB 500 milliGRAM(s) IV Intermittent every 24 hours  cefTRIAXone   IVPB 1000 milliGRAM(s) IV Intermittent every 24 hours  fluticasone propionate/ salmeterol 100-50 MICROgram(s) Diskus 1 Dose(s) Inhalation two times a day  methylPREDNISolone sodium succinate Injectable 40 milliGRAM(s) IV Push every 8 hours  metoprolol tartrate 12.5 milliGRAM(s) Oral two times a day  montelukast 10 milliGRAM(s) Oral daily  pantoprazole    Tablet 40 milliGRAM(s) Oral before breakfast    MEDICATIONS  (PRN):      Allergies    Allergy Status Unknown    Intolerances        LABS:                        11.7   12.19 )-----------( 211      ( 27 Sep 2024 06:25 )             37.6     09-27    136  |  99  |  31[H]  ----------------------------<  165[H]  4.5   |  34[H]  |  0.92    Ca    9.2      27 Sep 2024 06:25  Phos  2.9     09-27  Mg     2.4     09-27    TPro  8.0  /  Alb  3.2[L]  /  TBili  1.1  /  DBili  x   /  AST  22  /  ALT  25  /  AlkPhos  120  09-27      Urinalysis Basic - ( 27 Sep 2024 06:25 )    Color: x / Appearance: x / SG: x / pH: x  Gluc: 165 mg/dL / Ketone: x  / Bili: x / Urobili: x   Blood: x / Protein: x / Nitrite: x   Leuk Esterase: x / RBC: x / WBC x   Sq Epi: x / Non Sq Epi: x / Bacteria: x    Culture - Urine (09.24.24 @ 21:30)   Specimen Source: Clean Catch  Culture Results:   10,000 - 49,000 CFU/mL Escherichia coli   <10,000 CFU/ml Normal Urogenital cas present    Culture - Blood (09.24.24 @ 17:00)   Specimen Source: .Blood Blood-Peripheral  Culture Results:   No growth at 48 HoursCulture - Blood (09.24.24 @ 16:50)   Specimen Source: .Blood Blood-Peripheral  Culture Results:   No growth at 48 Hours        CAPILLARY BLOOD GLUCOSE            RADIOLOGY & ADDITIONAL TESTS:    CXR:  < from: Xray Chest 1 View-PORTABLE IMMEDIATE (Xray Chest 1 View-PORTABLE IMMEDIATE .) (09.24.24 @ 17:22) >  IMPRESSION:  1. Right upper lobe infiltrate concerning for pneumonia.  2. Prominent cardiac silhouette with pulmonary venous engorgement but   without malcom pulmonary edema.  3. Mild subsegmental atelectasis versus scarring in the left lower lobe.      < end of copied text >    Ct scan chest:    ekg;    echo:

## 2024-09-27 NOTE — PROGRESS NOTE ADULT - PROBLEM SELECTOR PLAN 1
p/w SOB, lethargy requiring BIPAP   pro-bnp neg  ABG indicative of chronic hypercapnia  RVP neg  CXR: Rt UL infiltrate concerning for PNA. Prominent cardiac silhouette with pulmonary venous engorgement but   without malcom pulmonary edema. Mild subsegmental atelectasis versus scarring in the Lt lower lobe.  likely 2/2 to COPD exacerbation provoked by PNA  pulm Dr. Son following  weaned to 2L NC  - O2 support as needed, maintain O2 sat 88-92%, avoid over oxygenation  - c/w home CPAP   - duoneb q6h, albuterol q6h  - incentive spirometer  - c/w azithro 500mg qd + rocephin 1g qd

## 2024-09-27 NOTE — PROGRESS NOTE ADULT - PROBLEM SELECTOR PLAN 3
p/w SOB and found to have hypercapnic resp failure   CXR: Rt UL infiltrate concerning for PNA. Prominent cardiac silhouette with pulmonary venous engorgement but   without malcom pulmonary edema. Mild subsegmental atelectasis versus scarring in the Lt lower lobe.  s/p rocephin and azithromycin   RVP neg  ID Dr. Denton following  strep neg, legionella neg  BCx neg NGTD @ 24 hrs  WBC improving  - c/w azithro 500mg qd + rocephin 1g qd  - f/u mycoplasma p/w SOB and found to have hypercapnic resp failure   CXR: Rt UL infiltrate concerning for PNA. Prominent cardiac silhouette with pulmonary venous engorgement but   without malcom pulmonary edema. Mild subsegmental atelectasis versus scarring in the Lt lower lobe.  s/p rocephin and azithromycin   RVP neg  ID Dr. Denton following  strep neg, legionella neg, mycoplasma neg  BCx neg NGTD @ 48 hrs  - c/w azithro 500mg qd + rocephin 1g qd

## 2024-09-27 NOTE — DIETITIAN INITIAL EVALUATION ADULT - NSFNSGIIOFT_GEN_A_CORE
Adjusted LMZ=549 lb   Wt data in EMR: 240 lb 9/24/24; 254.4 lb 9/25/24; 241.4 lb 9/26/24; 248.2 lb 9/27/24   a bit fluctuated, may due to scale/fluid variance, diuretic Rx

## 2024-09-27 NOTE — DIETITIAN INITIAL EVALUATION ADULT - DIET TYPE
May consider oral nutritional supplement if persistently decreased intake as medically feasible/if receptive/no liquids

## 2024-09-27 NOTE — DIETITIAN INITIAL EVALUATION ADULT - PERTINENT LABORATORY DATA
09-27    136  |  99  |  31[H]  ----------------------------<  165[H]  4.5   |  34[H]  |  0.92    Ca    9.2      27 Sep 2024 06:25  Phos  2.9     09-27  Mg     2.4     09-27    TPro  8.0  /  Alb  3.2[L]  /  TBili  1.1  /  DBili  x   /  AST  22  /  ALT  25  /  AlkPhos  120  09-27

## 2024-09-27 NOTE — PROGRESS NOTE ADULT - SUBJECTIVE AND OBJECTIVE BOX
Date of Service 09-27-24 @ 10:24    CHIEF COMPLAINT:Patient is a 89y old  Female who presents with a chief complaint of COPD vs CHF exacerbation .Pt appears comfortable.    	  REVIEW OF SYSTEMS:  CONSTITUTIONAL: No fever, weight loss, or fatigue  EYES: No eye pain, visual disturbances, or discharge  ENT:  No difficulty hearing, tinnitus, vertigo; No sinus or throat pain  NECK: No pain or stiffness  RESPIRATORY: No cough, wheezing, chills or hemoptysis; No Shortness of Breath  CARDIOVASCULAR: No chest pain, palpitations, passing out, dizziness, or leg swelling  GASTROINTESTINAL: No abdominal or epigastric pain. No nausea, vomiting, or hematemesis; No diarrhea or constipation. No melena or hematochezia.  GENITOURINARY: No dysuria, frequency, hematuria, or incontinence  NEUROLOGICAL: No headaches, memory loss, loss of strength, numbness, or tremors  SKIN: No itching, burning, rashes, or lesions   LYMPH Nodes: No enlarged glands  ENDOCRINE: No heat or cold intolerance; No hair loss  MUSCULOSKELETAL: No joint pain or swelling; No muscle, back, or extremity pain  PSYCHIATRIC: No depression, anxiety, mood swings, or difficulty sleeping  HEME/LYMPH: No easy bruising, or bleeding gums  ALLERGY AND IMMUNOLOGIC: No hives or eczema	        PHYSICAL EXAM:  T(C): 36.8 (09-27-24 @ 07:14), Max: 37.1 (09-26-24 @ 11:00)  HR: 77 (09-27-24 @ 07:14) (77 - 87)  BP: 128/78 (09-27-24 @ 07:14) (114/65 - 139/68)  RR: 18 (09-27-24 @ 07:14) (18 - 18)  SpO2: 100% (09-27-24 @ 07:14) (96% - 100%)  Wt(kg): --  I&O's Summary    26 Sep 2024 07:01  -  27 Sep 2024 07:00  --------------------------------------------------------  IN: 450 mL / OUT: 1200 mL / NET: -750 mL        Appearance: Normal	  HEENT:   Normal oral mucosa, PERRL, EOMI	  Lymphatic: No lymphadenopathy  Cardiovascular: Normal S1 S2, No JVD, No murmurs, No edema  Respiratory: B/L ronchi  Gastrointestinal:  Soft, Non-tender, + BS	  Skin: No rashes, No ecchymoses, No cyanosis	  Neurologic: Non-focal  Extremities: Normal range of motion, No clubbing, cyanosis or edema  Vascular: Peripheral pulses palpable 2+ bilaterally    MEDICATIONS  (STANDING):  albuterol    90 MICROgram(s) HFA Inhaler 2 Puff(s) Inhalation every 6 hours  albuterol/ipratropium for Nebulization 3 milliLiter(s) Nebulizer every 6 hours  anastrozole 1 milliGRAM(s) Oral daily  apixaban 5 milliGRAM(s) Oral two times a day  artificial  tears Solution 2 Drop(s) Both EYES four times a day  atorvastatin 10 milliGRAM(s) Oral at bedtime  azithromycin  IVPB 500 milliGRAM(s) IV Intermittent every 24 hours  cefTRIAXone   IVPB 1000 milliGRAM(s) IV Intermittent every 24 hours  fluticasone propionate/ salmeterol 100-50 MICROgram(s) Diskus 1 Dose(s) Inhalation two times a day  methylPREDNISolone sodium succinate Injectable 40 milliGRAM(s) IV Push every 12 hours  metoprolol tartrate 12.5 milliGRAM(s) Oral two times a day  montelukast 10 milliGRAM(s) Oral daily  pantoprazole    Tablet 40 milliGRAM(s) Oral before breakfast        	  LABS:	 	        Troponin I, High Sensitivity Result: 46.6 ng/L (09-24 @ 17:00)                            11.7   12.19 )-----------( 211      ( 27 Sep 2024 06:25 )             37.6     09-27    136  |  99  |  31[H]  ----------------------------<  165[H]  4.5   |  34[H]  |  0.92    Ca    9.2      27 Sep 2024 06:25  Phos  2.9     09-27  Mg     2.4     09-27    TPro  8.0  /  Alb  3.2[L]  /  TBili  1.1  /  DBili  x   /  AST  22  /  ALT  25  /  AlkPhos  120  09-27    proBNP:   Lipid Profile: Cholesterol 136  LDL --  HDL 66  TG 46  Ldl calc 61  Ratio --      	      < from: TTE W or WO Ultrasound Enhancing Agent (09.25.24 @ 10:54) >  CONCLUSIONS:      1. Technically difficult image quality.   2. Left atrium is severely dilated.   3. The right atrium is moderately dilated.   4. Left ventricular systolic function is normal with an ejection fraction of 57 % by Smtih's method of disks.   5. There is increased LV mass and concentric hypertrophy.   6. Enlarged right ventricular cavity size and reduced right ventricular systolic function. Tricuspid annular plane systolic excursion (TAPSE) is 1.4 cm (normal >=1.7 cm).   7. Estimated pulmonary artery systolic pressure is 69 mmHg, consistent with severe pulmonary hypertension.      < end of copied text >

## 2024-09-27 NOTE — PROGRESS NOTE ADULT - PROBLEM SELECTOR PLAN 4
hx of CHF with last echo 2022, EF 56%  likely not the cause of the patients AHRF but still possible   CXR: Rt UL infiltrate concerning for PNA. Prominent cardiac silhouette with pulmonary venous engorgement but   without malcom pulmonary edema. Mild subsegmental atelectasis versus scarring in the Lt lower lobe.  BNP 1147, trop neg  s/p 60 mg of lasix  cardio Dr. Siu following  pt not in CHF as per cardio, no need for diuretics  - monitor volume status

## 2024-09-27 NOTE — PROGRESS NOTE ADULT - SUBJECTIVE AND OBJECTIVE BOX
PGY-1 Progress Note discussed with attending    PAGER #: [[phone]] TILL 5:00 PM  PLEASE CONTACT ON CALL TEAM:  - On Call Team (Please refer to Cassy) FROM 5:00 PM - 8:30PM  - Nightfloat Team FROM 8:30 -7:30 AM    INTERVAL HPI/OVERNIGHT EVENTS:   -     REVIEW OF SYSTEMS:  CONSTITUTIONAL: No fever, weight loss, or fatigue  RESPIRATORY: No cough, wheezing, chills or hemoptysis; No shortness of breath  CARDIOVASCULAR: No chest pain, palpitations, dizziness, or leg swelling  GASTROINTESTINAL: No abdominal pain. No nausea, vomiting, or hematemesis; No diarrhea or constipation. No melena or hematochezia.  GENITOURINARY: No dysuria or hematuria, urinary frequency  NEUROLOGICAL: No headaches, memory loss, loss of strength, numbness, or tremors  SKIN: No itching, burning, rashes, or lesions     MEDICATIONS  (STANDING):  albuterol    90 MICROgram(s) HFA Inhaler 2 Puff(s) Inhalation every 6 hours  albuterol/ipratropium for Nebulization 3 milliLiter(s) Nebulizer every 6 hours  anastrozole 1 milliGRAM(s) Oral daily  apixaban 5 milliGRAM(s) Oral two times a day  artificial  tears Solution 2 Drop(s) Both EYES four times a day  atorvastatin 10 milliGRAM(s) Oral at bedtime  azithromycin  IVPB 500 milliGRAM(s) IV Intermittent every 24 hours  cefTRIAXone   IVPB 1000 milliGRAM(s) IV Intermittent every 24 hours  fluticasone propionate/ salmeterol 100-50 MICROgram(s) Diskus 1 Dose(s) Inhalation two times a day  methylPREDNISolone sodium succinate Injectable 40 milliGRAM(s) IV Push every 12 hours  metoprolol tartrate 12.5 milliGRAM(s) Oral two times a day  montelukast 10 milliGRAM(s) Oral daily  pantoprazole    Tablet 40 milliGRAM(s) Oral before breakfast    MEDICATIONS  (PRN):      Vital Signs Last 24 Hrs  T(C): 36.5 (27 Sep 2024 11:12), Max: 37 (26 Sep 2024 23:25)  T(F): 97.7 (27 Sep 2024 11:12), Max: 98.6 (26 Sep 2024 23:25)  HR: 87 (27 Sep 2024 11:12) (77 - 87)  BP: 97/72 (27 Sep 2024 11:12) (97/72 - 139/68)  BP(mean): --  RR: 19 (27 Sep 2024 11:12) (18 - 19)  SpO2: 100% (27 Sep 2024 11:12) (97% - 100%)    Parameters below as of 27 Sep 2024 11:12  Patient On (Oxygen Delivery Method): room air        PHYSICAL EXAMINATION:  GENERAL: NAD, lying in bed comfortably. AAOx  NERVOUS SYSTEM: Speech clear. No deficits   HEAD:  Atraumatic, Normocephalic  EYES: EOMI, PERRLA, conjunctiva and sclera clear  ENT: Moist mucous membranes  NECK: Supple, No JVD, normal thyroid  CHEST/LUNG: Clear to auscultation bilaterally; no rales, rhonchi, wheezing, or rubs. No unlabored respirations   HEART: Regular rate and rhythm; No murmurs, rubs, or gallops  ABDOMEN: Bowel sounds present; Soft, nontender, nondistended.   EXTREMITIES:  2+ Peripheral Pulses, brisk capillary refill. No clubbing, cyanosis, or edema  MSK: FROM all 4 extremities, full and equal strength  SKIN: Warm dry. No rashes or lesions                          11.7   12.19 )-----------( 211      ( 27 Sep 2024 06:25 )             37.6     09-27    136  |  99  |  31[H]  ----------------------------<  165[H]  4.5   |  34[H]  |  0.92    Ca    9.2      27 Sep 2024 06:25  Phos  2.9     09-27  Mg     2.4     09-27    TPro  8.0  /  Alb  3.2[L]  /  TBili  1.1  /  DBili  x   /  AST  22  /  ALT  25  /  AlkPhos  120  09-27    LIVER FUNCTIONS - ( 27 Sep 2024 06:25 )  Alb: 3.2 g/dL / Pro: 8.0 g/dL / ALK PHOS: 120 U/L / ALT: 25 U/L DA / AST: 22 U/L / GGT: x                     CAPILLARY BLOOD GLUCOSE          RADIOLOGY & ADDITIONAL TESTS:                   PGY-1 Progress Note discussed with attending    PAGER #: [[phone]] TILL 5:00 PM  PLEASE CONTACT ON CALL TEAM:  - On Call Team (Please refer to Cassy) FROM 5:00 PM - 8:30PM  - Nightfloat Team FROM 8:30 -7:30 AM    INTERVAL HPI/OVERNIGHT EVENTS:   - No acute events overnight.  Patient examined at bedside this AM.  Patient denies acute complaints and states that she feels better than before.     REVIEW OF SYSTEMS:  CONSTITUTIONAL: No fever, weight loss, or fatigue  RESPIRATORY: No cough, wheezing, chills or hemoptysis; No shortness of breath  CARDIOVASCULAR: No chest pain, palpitations, dizziness, or leg swelling  GASTROINTESTINAL: No abdominal pain. No nausea, vomiting, or hematemesis; No diarrhea or constipation.   GENITOURINARY: No dysuria or hematuria, urinary frequency  NEUROLOGICAL: No headaches, memory loss, loss of strength, numbness, or tremors  SKIN: No itching, burning, rashes, or lesions     MEDICATIONS  (STANDING):  albuterol    90 MICROgram(s) HFA Inhaler 2 Puff(s) Inhalation every 6 hours  albuterol/ipratropium for Nebulization 3 milliLiter(s) Nebulizer every 6 hours  anastrozole 1 milliGRAM(s) Oral daily  apixaban 5 milliGRAM(s) Oral two times a day  artificial  tears Solution 2 Drop(s) Both EYES four times a day  atorvastatin 10 milliGRAM(s) Oral at bedtime  azithromycin  IVPB 500 milliGRAM(s) IV Intermittent every 24 hours  cefTRIAXone   IVPB 1000 milliGRAM(s) IV Intermittent every 24 hours  fluticasone propionate/ salmeterol 100-50 MICROgram(s) Diskus 1 Dose(s) Inhalation two times a day  methylPREDNISolone sodium succinate Injectable 40 milliGRAM(s) IV Push every 12 hours  metoprolol tartrate 12.5 milliGRAM(s) Oral two times a day  montelukast 10 milliGRAM(s) Oral daily  pantoprazole    Tablet 40 milliGRAM(s) Oral before breakfast    MEDICATIONS  (PRN):      Vital Signs Last 24 Hrs  T(C): 36.5 (27 Sep 2024 11:12), Max: 37 (26 Sep 2024 23:25)  T(F): 97.7 (27 Sep 2024 11:12), Max: 98.6 (26 Sep 2024 23:25)  HR: 87 (27 Sep 2024 11:12) (77 - 87)  BP: 97/72 (27 Sep 2024 11:12) (97/72 - 139/68)  BP(mean): --  RR: 19 (27 Sep 2024 11:12) (18 - 19)  SpO2: 100% (27 Sep 2024 11:12) (97% - 100%)    Parameters below as of 27 Sep 2024 11:12  Patient On (Oxygen Delivery Method): room air        PHYSICAL EXAMINATION:  GENERAL: NAD, lying in bed comfortably. AAOx  NERVOUS SYSTEM: Speech clear. No deficits   HEAD:  Atraumatic, Normocephalic  EYES: EOMI, PERRLA, conjunctiva and sclera clear  ENT: Moist mucous membranes  NECK: Supple, No JVD, normal thyroid  CHEST/LUNG: Clear to auscultation bilaterally; no rales, rhonchi, wheezing, or rubs. No unlabored respirations   HEART: Regular rate and rhythm; No murmurs, rubs, or gallops  ABDOMEN: Bowel sounds present; Soft, nontender, nondistended.   EXTREMITIES:  2+ Peripheral Pulses, brisk capillary refill. No clubbing, cyanosis, or edema  MSK: FROM all 4 extremities, full and equal strength  SKIN: Warm dry. No rashes or lesions                          11.7   12.19 )-----------( 211      ( 27 Sep 2024 06:25 )             37.6     09-27    136  |  99  |  31[H]  ----------------------------<  165[H]  4.5   |  34[H]  |  0.92    Ca    9.2      27 Sep 2024 06:25  Phos  2.9     09-27  Mg     2.4     09-27    TPro  8.0  /  Alb  3.2[L]  /  TBili  1.1  /  DBili  x   /  AST  22  /  ALT  25  /  AlkPhos  120  09-27    LIVER FUNCTIONS - ( 27 Sep 2024 06:25 )  Alb: 3.2 g/dL / Pro: 8.0 g/dL / ALK PHOS: 120 U/L / ALT: 25 U/L DA / AST: 22 U/L / GGT: x                     CAPILLARY BLOOD GLUCOSE          RADIOLOGY & ADDITIONAL TESTS:

## 2024-09-27 NOTE — DIETITIAN INITIAL EVALUATION ADULT - PERTINENT MEDS FT
MEDICATIONS  (STANDING):  albuterol    90 MICROgram(s) HFA Inhaler 2 Puff(s) Inhalation every 6 hours  albuterol/ipratropium for Nebulization 3 milliLiter(s) Nebulizer every 6 hours  anastrozole 1 milliGRAM(s) Oral daily  apixaban 5 milliGRAM(s) Oral two times a day  artificial  tears Solution 2 Drop(s) Both EYES four times a day  atorvastatin 10 milliGRAM(s) Oral at bedtime  azithromycin  IVPB 500 milliGRAM(s) IV Intermittent every 24 hours  cefTRIAXone   IVPB 1000 milliGRAM(s) IV Intermittent every 24 hours  fluticasone propionate/ salmeterol 100-50 MICROgram(s) Diskus 1 Dose(s) Inhalation two times a day  methylPREDNISolone sodium succinate Injectable 40 milliGRAM(s) IV Push every 8 hours  metoprolol tartrate 12.5 milliGRAM(s) Oral two times a day  montelukast 10 milliGRAM(s) Oral daily  pantoprazole    Tablet 40 milliGRAM(s) Oral before breakfast    MEDICATIONS  (PRN):

## 2024-09-27 NOTE — PROGRESS NOTE ADULT - ASSESSMENT
Patient is a 89y old  Female who is from home, AAOx0 , HHA 7 hours a day  5 days a week, ambulates with walker, with morbid obesity, COPD (home O2 2L, noncompliant with cpap, multiple hospitalizations at [facility] for COPD exacerbation under MRN [id number]), HFpEF (EF 55-60%), severe RV HTN and HLD, now presents to the ER for evaluation of lethargy. Collateral info obtained from granddaughter and daughter Sylvia at bedside. Sylvia lives with the patient. Say that pt was in her usual state of health , had a drink of kenyon and was feeling well, until about 3pm on day of presentation when she started saying she was feeling unwell. They took her oxygen at home and it was 50s and then went back up to 80s They called EMS, she was put on CPAP and given 2 doses of SL nitro. In the ED pt was lethargic, encephalopathic, minimally conversant. As per family this is not her baseline. Family admits that pt has not been on her home cpap in a "very long time." On admission, she found to have no fever, but positive Urine analysis and RUL infiltrate. She has started on Ceftriaxone and Azithromycin, and the ID consult requested to assist with further evaluation and antibiotic management.    # Pneumonia - Legionella urine Ag is negative  # Acute Respiratory Failure - on BIPAP  # UTI - Urine cx grew E.coli    would recommend:    1. Please discontinue Azithromycin since Legionella urine Ag is negative  2. Follow up final Urine culture for the sensitivities of the Isolates  3. Aspiration precaution  4. Supplemental oxygenation and bronchodilator as needed  5. Continue Ceftriaxone until work up is done    d/w covering House staff, DR. Pierre    Attending Attestation:     Spent more than 35 minutes on total encounter, more than 50 % of the visit was spent counseling and/or coordinating care by the Attending physician.

## 2024-09-28 DIAGNOSIS — I48.91 UNSPECIFIED ATRIAL FIBRILLATION: ICD-10-CM

## 2024-09-28 DIAGNOSIS — I27.20 PULMONARY HYPERTENSION, UNSPECIFIED: ICD-10-CM

## 2024-09-28 LAB
ALBUMIN SERPL ELPH-MCNC: 3 G/DL — LOW (ref 3.5–5)
ALP SERPL-CCNC: 118 U/L — SIGNIFICANT CHANGE UP (ref 40–120)
ALT FLD-CCNC: 36 U/L DA — SIGNIFICANT CHANGE UP (ref 10–60)
ANION GAP SERPL CALC-SCNC: 3 MMOL/L — LOW (ref 5–17)
AST SERPL-CCNC: 24 U/L — SIGNIFICANT CHANGE UP (ref 10–40)
BILIRUB SERPL-MCNC: 1 MG/DL — SIGNIFICANT CHANGE UP (ref 0.2–1.2)
BUN SERPL-MCNC: 32 MG/DL — HIGH (ref 7–18)
CALCIUM SERPL-MCNC: 8.8 MG/DL — SIGNIFICANT CHANGE UP (ref 8.4–10.5)
CHLORIDE SERPL-SCNC: 99 MMOL/L — SIGNIFICANT CHANGE UP (ref 96–108)
CO2 SERPL-SCNC: 36 MMOL/L — HIGH (ref 22–31)
CREAT SERPL-MCNC: 0.84 MG/DL — SIGNIFICANT CHANGE UP (ref 0.5–1.3)
EGFR: 65 ML/MIN/1.73M2 — SIGNIFICANT CHANGE UP
GLUCOSE SERPL-MCNC: 150 MG/DL — HIGH (ref 70–99)
HCT VFR BLD CALC: 37.9 % — SIGNIFICANT CHANGE UP (ref 34.5–45)
HGB BLD-MCNC: 11.9 G/DL — SIGNIFICANT CHANGE UP (ref 11.5–15.5)
MAGNESIUM SERPL-MCNC: 2.5 MG/DL — SIGNIFICANT CHANGE UP (ref 1.6–2.6)
MCHC RBC-ENTMCNC: 30.4 PG — SIGNIFICANT CHANGE UP (ref 27–34)
MCHC RBC-ENTMCNC: 31.4 GM/DL — LOW (ref 32–36)
MCV RBC AUTO: 96.7 FL — SIGNIFICANT CHANGE UP (ref 80–100)
NRBC # BLD: 0 /100 WBCS — SIGNIFICANT CHANGE UP (ref 0–0)
NT-PROBNP SERPL-SCNC: 4222 PG/ML — HIGH (ref 0–450)
PHOSPHATE SERPL-MCNC: 2.7 MG/DL — SIGNIFICANT CHANGE UP (ref 2.5–4.5)
PLATELET # BLD AUTO: 219 K/UL — SIGNIFICANT CHANGE UP (ref 150–400)
POTASSIUM SERPL-MCNC: 4.8 MMOL/L — SIGNIFICANT CHANGE UP (ref 3.5–5.3)
POTASSIUM SERPL-SCNC: 4.8 MMOL/L — SIGNIFICANT CHANGE UP (ref 3.5–5.3)
PROT SERPL-MCNC: 7.6 G/DL — SIGNIFICANT CHANGE UP (ref 6–8.3)
RBC # BLD: 3.92 M/UL — SIGNIFICANT CHANGE UP (ref 3.8–5.2)
RBC # FLD: 14.6 % — HIGH (ref 10.3–14.5)
SODIUM SERPL-SCNC: 138 MMOL/L — SIGNIFICANT CHANGE UP (ref 135–145)
TROPONIN I, HIGH SENSITIVITY RESULT: 30.2 NG/L — SIGNIFICANT CHANGE UP
WBC # BLD: 10.57 K/UL — HIGH (ref 3.8–10.5)
WBC # FLD AUTO: 10.57 K/UL — HIGH (ref 3.8–10.5)

## 2024-09-28 RX ADMIN — IPRATROPIUM BROMIDE AND ALBUTEROL SULFATE 3 MILLILITER(S): .5; 3 SOLUTION RESPIRATORY (INHALATION) at 09:14

## 2024-09-28 RX ADMIN — Medication 2 DROP(S): at 11:56

## 2024-09-28 RX ADMIN — ANASTROZOLE 1 MILLIGRAM(S): 1 TABLET ORAL at 11:56

## 2024-09-28 RX ADMIN — Medication 1 DOSE(S): at 10:01

## 2024-09-28 RX ADMIN — Medication 2 DROP(S): at 23:19

## 2024-09-28 RX ADMIN — Medication 2 DROP(S): at 05:27

## 2024-09-28 RX ADMIN — Medication 1 DOSE(S): at 22:07

## 2024-09-28 RX ADMIN — AZITHROMYCIN 255 MILLIGRAM(S): 250 TABLET, FILM COATED ORAL at 06:09

## 2024-09-28 RX ADMIN — Medication 12.5 MILLIGRAM(S): at 17:35

## 2024-09-28 RX ADMIN — Medication 2 DROP(S): at 17:36

## 2024-09-28 RX ADMIN — METHYLPREDNISOLONE ACETATE 40 MILLIGRAM(S): 80 INJECTION, SUSPENSION INTRA-ARTICULAR; INTRALESIONAL; INTRAMUSCULAR; SOFT TISSUE at 17:35

## 2024-09-28 RX ADMIN — PANTOPRAZOLE SODIUM 40 MILLIGRAM(S): 40 TABLET, DELAYED RELEASE ORAL at 05:28

## 2024-09-28 RX ADMIN — IPRATROPIUM BROMIDE AND ALBUTEROL SULFATE 3 MILLILITER(S): .5; 3 SOLUTION RESPIRATORY (INHALATION) at 03:43

## 2024-09-28 RX ADMIN — Medication 100 MILLIGRAM(S): at 05:27

## 2024-09-28 RX ADMIN — MONTELUKAST SODIUM 10 MILLIGRAM(S): 10 TABLET, FILM COATED ORAL at 11:56

## 2024-09-28 RX ADMIN — IPRATROPIUM BROMIDE AND ALBUTEROL SULFATE 3 MILLILITER(S): .5; 3 SOLUTION RESPIRATORY (INHALATION) at 20:50

## 2024-09-28 RX ADMIN — METHYLPREDNISOLONE ACETATE 40 MILLIGRAM(S): 80 INJECTION, SUSPENSION INTRA-ARTICULAR; INTRALESIONAL; INTRAMUSCULAR; SOFT TISSUE at 05:27

## 2024-09-28 RX ADMIN — APIXABAN 5 MILLIGRAM(S): 5 TABLET, FILM COATED ORAL at 05:26

## 2024-09-28 RX ADMIN — APIXABAN 5 MILLIGRAM(S): 5 TABLET, FILM COATED ORAL at 17:35

## 2024-09-28 RX ADMIN — ATORVASTATIN CALCIUM 10 MILLIGRAM(S): 10 TABLET, FILM COATED ORAL at 22:07

## 2024-09-28 RX ADMIN — IPRATROPIUM BROMIDE AND ALBUTEROL SULFATE 3 MILLILITER(S): .5; 3 SOLUTION RESPIRATORY (INHALATION) at 14:31

## 2024-09-28 RX ADMIN — Medication 12.5 MILLIGRAM(S): at 05:26

## 2024-09-28 NOTE — PROGRESS NOTE ADULT - ASSESSMENT
89 yo F from home, AAOx0 , HHA 7 hours a day  5 days a week, ambulates with walker, with morbid obesity, COPD (home O2 2L, noncompliant with cpap, multiple hospitalizations at [facility] for COPD exacerbation under MRN [id number]), HFpEF (EF 55-60%), severe RV HTN,Chronic Afib and HLD  presenting with complaint of lethargy,acute respiratory failure with CO2 retention and RUL pneumonia.  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  2.Afib-NOAC,lopressor.  3.RUL pneumonia-abx.  4.COPD and CO2 retention-off Bipap,Pulm f/u,steroids.  5.Pt not in CHF,no need for diuretics.  6.PPI.

## 2024-09-28 NOTE — PROGRESS NOTE ADULT - PROBLEM SELECTOR PLAN 5
UA +ve  UCx E. coli  pt asymptomatic  - c/w rocephin hx of chronic Afib  BNP 1147, trop neg  TTE: dilated LA, LVEF 57%, increased LV mass and concentric hypertrophy, enlarged RV cavity size and reduced RV systolic function  CHADSVASC score of 5 with stroke risk of 7.2%  Dr. Siu following   not on AC  - lopressor for rate control  - tele monitoring - Goal rate <110 TTE pulmonary artery systolic pressure is 69 mmHg  consistent with severe pulmonary hypertension  pulm Dr. Son following  - c/w bronchodilators

## 2024-09-28 NOTE — PROGRESS NOTE ADULT - PROBLEM SELECTOR PLAN 8
DVT: lovenox hx of htn on carvedilol  - c/w lopressor as per cardio  - monitor for hemodynamic instability hx of hld  lipid panel normal  - c/w simvastatin

## 2024-09-28 NOTE — PROGRESS NOTE ADULT - SUBJECTIVE AND OBJECTIVE BOX
Patient is seen and examined at the bed side, is afebrile. The final urine culture has been reviewed. The Family at the bed side.        REVIEW OF SYSTEMS: All other review systems are negative      ALLERGY: Allergy Status Unknown      Vital Signs Last 24 Hrs  T(C): 37 (28 Sep 2024 15:51), Max: 37.2 (27 Sep 2024 21:05)  T(F): 98.6 (28 Sep 2024 15:51), Max: 99 (27 Sep 2024 21:05)  HR: 95 (28 Sep 2024 17:03) (76 - 95)  BP: 141/74 (28 Sep 2024 17:03) (134/98 - 152/101)  BP(mean): --  RR: 18 (28 Sep 2024 15:51) (18 - 18)  SpO2: 98% (28 Sep 2024 17:03) (98% - 100%)    Parameters below as of 28 Sep 2024 17:03  Patient On (Oxygen Delivery Method): nasal cannula  O2 Flow (L/min): 3        PHYSICAL EXAM:  GENERAL: Not in distress   CHEST/LUNG:  Not using accessory muscles   HEART: s1 and s2 present  ABDOMEN:  Nontender and  Nondistended  EXTREMITIES: No pedal  edema  CNS: Awake and Alert      LABS:                        11.9   10.57 )-----------( 219      ( 28 Sep 2024 06:45 )             37.9                           11.7   12.19 )-----------( 211      ( 27 Sep 2024 06:25 )             37.6         09-28    138  |  99  |  32[H]  ----------------------------<  150[H]  4.8   |  36[H]  |  0.84    Ca    8.8      28 Sep 2024 06:45  Phos  2.7     09-28  Mg     2.5     09-28    TPro  7.6  /  Alb  3.0[L]  /  TBili  1.0  /  DBili  x   /  AST  24  /  ALT  36  /  AlkPhos  118  09-28 09-27    136  |  99  |  31[H]  ----------------------------<  165[H]  4.5   |  34[H]  |  0.92    Ca    9.2      27 Sep 2024 06:25  Phos  2.9     09-27  Mg     2.4     09-27    TPro  8.0  /  Alb  3.2[L]  /  TBili  1.1  /  DBili  x   /  AST  22  /  ALT  25  /  AlkPhos  120  09-27    PT/INR - ( 24 Sep 2024 17:00 )   PT: 21.6 sec;   INR: 1.86 ratio       PTT - ( 24 Sep 2024 17:00 )  PTT:41.7 sec      POCT Blood Glucose.: 165 mg/dL (24 Sep 2024 16:46)  ABG - ( 25 Sep 2024 04:06 )  pH, Arterial: 7.29  pH, Blood: x     /  pCO2: 74    /  pO2: 73    / HCO3: 36    / Base Excess: 6.3   /  SaO2: 93            MEDICATIONS  (STANDING):    albuterol    90 MICROgram(s) HFA Inhaler 2 Puff(s) Inhalation every 6 hours  albuterol/ipratropium for Nebulization 3 milliLiter(s) Nebulizer every 6 hours  anastrozole 1 milliGRAM(s) Oral daily  apixaban 5 milliGRAM(s) Oral two times a day  artificial  tears Solution 2 Drop(s) Both EYES four times a day  atorvastatin 10 milliGRAM(s) Oral at bedtime  cefTRIAXone   IVPB 1000 milliGRAM(s) IV Intermittent every 24 hours  fluticasone propionate/ salmeterol 100-50 MICROgram(s) Diskus 1 Dose(s) Inhalation two times a day  methylPREDNISolone sodium succinate Injectable 40 milliGRAM(s) IV Push every 12 hours  metoprolol tartrate 12.5 milliGRAM(s) Oral two times a day  montelukast 10 milliGRAM(s) Oral daily  pantoprazole    Tablet 40 milliGRAM(s) Oral before breakfast        RADIOLOGY & ADDITIONAL TESTS:    9/24/24: Xray Chest 1 View-PORTABLE IMMEDIATE (Xray Chest 1 View-PORTABLE IMMEDIATE .) (09.24.24 @ 17:22) >  1. Right upper lobe infiltrate concerning for pneumonia.  2. Prominent cardiac silhouette with pulmonary venous engorgement but without malcom pulmonary edema.  3. Mild subsegmental atelectasis versus scarring in the left lower lobe.      MICROBIOLOGY DATA:    Culture - Urine (09.24.24 @ 21:30)   - Amoxicillin/Clavulanic Acid: S <=8/4  - Ampicillin: S <=8 These ampicillin results predict results for amoxicillin  - Ampicillin/Sulbactam: S <=4/2  - Aztreonam: S <=4  - Cefazolin: S <=2 For uncomplicated UTI with K. pneumoniae, E. coli, or P. mirablis: NIDIA <=16 is sensitive and NIDIA >=32 is resistant. This also predicts results for oral agents cefaclor, cefdinir, cefpodoxime, cefprozil, cefuroxime axetil, cephalexin and locarbef for uncomplicated UTI. Note that some isolates may be susceptible to these agents while testing resistant to cefazolin.  - Cefepime: S <=2  - Cefoxitin: S <=8  - Ceftriaxone: S <=1  - Cefuroxime: S <=4  - Ciprofloxacin: R >2  - Ertapenem: S <=0.5  - Gentamicin: S <=2  - Imipenem: S <=1  - Levofloxacin: R >4  - Meropenem: S <=1  - Nitrofurantoin: S <=32 Should not be used to treat pyelonephritis  - Piperacillin/Tazobactam: SDD 16  - Tobramycin: S <=2  - Trimethoprim/Sulfamethoxazole: R >2/38  Specimen Source: Clean Catch  Culture Results:   10,000 - 49,000 CFU/mL Escherichia coli   <10,000 CFU/ml Normal Urogenital cas present  Organism Identification: Escherichia coli  Organism: Escherichia coli  Method Type: [facility]    Legionella pneumophila Antigen, Urine (09.25.24 @ 07:21)   Legionella Antigen, Urine: Negative    Culture - Urine (09.24.24 @ 21:30)   Specimen Source: Clean Catch  Culture Results:   10,000 - 49,000 CFU/mL Escherichia coli   <10,000 CFU/ml Normal Urogenital cas present    Culture - Blood (09.24.24 @ 17:00)   Specimen Source: .Blood Blood-Peripheral  Culture Results: No growth at 24 hours    Culture - Blood (09.24.24 @ 16:50)   Specimen Source: .Blood Blood-Peripheral  Culture Results: No growth at 24 hours    Urine Microscopic-Add On (NC) (09.24.24 @ 21:30)   Red Blood Cell - Urine: 8 /HPF  White Blood Cell - Urine: 25 /HPF  Bacteria: Moderate /HPF  Squamous Epithelial Cells: Present

## 2024-09-28 NOTE — PROGRESS NOTE ADULT - ASSESSMENT
Patient is a 89y old  Female who is from home, AAOx0 , HHA 7 hours a day  5 days a week, ambulates with walker, with morbid obesity, COPD (home O2 2L, noncompliant with cpap, multiple hospitalizations at [facility] for COPD exacerbation under MRN [id number]), HFpEF (EF 55-60%), severe RV HTN and HLD, now presents to the ER for evaluation of lethargy. Collateral info obtained from granddaughter and daughter Sylvia at bedside. Sylvia lives with the patient. Say that pt was in her usual state of health , had a drink of kenyon and was feeling well, until about 3pm on day of presentation when she started saying she was feeling unwell. They took her oxygen at home and it was 50s and then went back up to 80s They called EMS, she was put on CPAP and given 2 doses of SL nitro. In the ED pt was lethargic, encephalopathic, minimally conversant. As per family this is not her baseline. Family admits that pt has not been on her home cpap in a "very long time." On admission, she found to have no fever, but positive Urine analysis and RUL infiltrate. She has started on Ceftriaxone and Azithromycin, and the ID consult requested to assist with further evaluation and antibiotic management.    # Pneumonia - Legionella urine Ag is negative  # Acute Respiratory Failure - on BIPAP transition to NC  # UTI - Urine cx grew E.coli    would recommend:    1. Continue Ceftriaxone until 9/30/24  2. Aspiration precaution  3. Supplemental oxygenation and bronchodilator as needed  4. OOB to chair    d/w covering House staff, DR. Pierre    Attending Attestation:     Spent more than 35 minutes on total encounter, more than 50 % of the visit was spent counseling and/or coordinating care by the Attending physician.

## 2024-09-28 NOTE — PROGRESS NOTE ADULT - ASSESSMENT
89 F, w/ morbid obesity, COPD (home O2 2L, noncompliant with CPAP, multiple hospitalizations at [facility] for COPD exacerbation under MRN [id number]), HFpEF (EF 55-60%), severe RV HTN, HLD p/w SOB. Admitted for hypercapnic respiratory failure likely 2/2 COPD exacerbation. Weaned to 2L NC. UCx E. Coli. On CTX and azithro for PNA. BCx NGTD @ 24 hrs.    89 F, w/ morbid obesity, COPD (home O2 2L, noncompliant with CPAP, multiple hospitalizations at [facility] for COPD exacerbation under MRN [id number]), HFpEF (EF 55-60%), severe RV HTN, HLD p/w SOB. Admitted for hypercapnic respiratory failure likely 2/2 COPD exacerbation. On 2L NC. s/p azithro. UCx E. Coli. On CTX for PNA and UTI. BCx NGTD @ 72 hrs.    89 F, w/ morbid obesity, COPD (home O2 2L, noncompliant with CPAP, multiple hospitalizations at [facility] for COPD exacerbation under MRN [id number]), HFpEF (EF 55-60%), severe RV HTN, HLD p/w SOB. Admitted for hypercapnic respiratory failure likely 2/2 COPD exacerbation. On 2L NC. s/p azithro. UCx E. Coli. On CTX for PNA and UTI. BCx NGTD @ 72 hrs.

## 2024-09-28 NOTE — PROGRESS NOTE ADULT - PROBLEM SELECTOR PLAN 1
p/w SOB, lethargy requiring BIPAP   pro-bnp neg  ABG indicative of chronic hypercapnia  RVP neg  CXR: Rt UL infiltrate concerning for PNA. Prominent cardiac silhouette with pulmonary venous engorgement but   without malcom pulmonary edema. Mild subsegmental atelectasis versus scarring in the Lt lower lobe.  likely 2/2 to COPD exacerbation provoked by PNA  pulm Dr. Son following  weaned to 2L NC  - O2 support as needed, maintain O2 sat 88-92%, avoid over oxygenation  - c/w home CPAP   - duoneb q6h, albuterol q6h  - incentive spirometer  - c/w azithro 500mg qd + rocephin 1g qd p/w SOB, lethargy requiring BIPAP   pro-bnp neg  ABG indicative of chronic hypercapnia  RVP neg  CXR: Rt UL infiltrate concerning for PNA. Prominent cardiac silhouette w/ pulmonary venous engorgement but   w/o malcom pulmonary edema. Mild subsegmental atelectasis versus scarring in the Lt LL  likely 2/2 to COPD exacerbation provoked by PNA  pulm Dr. Son following  - c/w 2L NC  - O2 support as needed, maintain O2 sat 88-92%, avoid over oxygenation  - c/w home CPAP   - duoneb q6h, albuterol q6h  - incentive sandra  - c/w azithro 500mg qd + rocephin 1g qd p/w SOB, lethargy requiring BIPAP   pro-bnp neg  ABG indicative of chronic hypercapnia  RVP neg, s/p rocephin and azithromycin in ED  CXR: Rt UL infiltrate concerning for PNA. Prominent cardiac silhouette w/ pulmonary venous engorgement but   w/o malcom pulmonary edema. Mild subsegmental atelectasis versus scarring in the Lt LL  likely 2/2 to COPD exacerbation provoked by PNA  pulm Dr. Son following  - c/w 2L NC  - O2 support as needed, maintain O2 sat 88-92%, avoid over oxygenation  - c/w home CPAP PRN  - duoneb q6h, albuterol q6h  - incentive sandra  - c/w rocephin 1g qd

## 2024-09-28 NOTE — PROGRESS NOTE ADULT - PROBLEM SELECTOR PLAN 7
hx of hld  lipid panel normal  - c/w simvastatin UA +ve  UCx E. coli  pt asymptomatic  - c/w rocephin hx of htn on carvedilol  - c/w lopressor as per cardio  - monitor for hemodynamic instability

## 2024-09-28 NOTE — PROGRESS NOTE ADULT - PROBLEM SELECTOR PLAN 2
hx of COPD, on 2L PRN at home  - see above hx of COPD, on 2L PRN at home  exacerbation provoked by PNA  - see above

## 2024-09-28 NOTE — PROGRESS NOTE ADULT - SUBJECTIVE AND OBJECTIVE BOX
Date of Service 09-28-24 @ 10:41    CHIEF COMPLAINT:Patient is a 89y old  Female who presents with a chief complaint of COPD vs CHF exacerbation.Pt appears comfortable.    	  REVIEW OF SYSTEMS:  CONSTITUTIONAL: No fever, weight loss, or fatigue  EYES: No eye pain, visual disturbances, or discharge  ENT:  No difficulty hearing, tinnitus, vertigo; No sinus or throat pain  NECK: No pain or stiffness  RESPIRATORY: No cough, wheezing, chills or hemoptysis; No Shortness of Breath  CARDIOVASCULAR: No chest pain, palpitations, passing out, dizziness, or leg swelling  GASTROINTESTINAL: No abdominal or epigastric pain. No nausea, vomiting, or hematemesis; No diarrhea or constipation. No melena or hematochezia.  GENITOURINARY: No dysuria, frequency, hematuria, or incontinence  NEUROLOGICAL: No headaches, memory loss, loss of strength, numbness, or tremors  SKIN: No itching, burning, rashes, or lesions   LYMPH Nodes: No enlarged glands  ENDOCRINE: No heat or cold intolerance; No hair loss  MUSCULOSKELETAL: No joint pain or swelling; No muscle, back, or extremity pain  PSYCHIATRIC: No depression, anxiety, mood swings, or difficulty sleeping  HEME/LYMPH: No easy bruising, or bleeding gums  ALLERGY AND IMMUNOLOGIC: No hives or eczema	      PHYSICAL EXAM:  T(C): 36.7 (09-28-24 @ 07:26), Max: 37.2 (09-27-24 @ 21:05)  HR: 84 (09-28-24 @ 08:52) (76 - 91)  BP: 134/98 (09-28-24 @ 07:26) (97/72 - 141/96)  RR: 18 (09-28-24 @ 07:26) (18 - 19)  SpO2: 99% (09-28-24 @ 08:52) (97% - 100%)  Wt(kg): --  I&O's Summary    27 Sep 2024 07:01  -  28 Sep 2024 07:00  --------------------------------------------------------  IN: 0 mL / OUT: 1900 mL / NET: -1900 mL    28 Sep 2024 07:01  -  28 Sep 2024 10:41  --------------------------------------------------------  IN: 220 mL / OUT: 0 mL / NET: 220 mL        Appearance: Normal	  HEENT:   Normal oral mucosa, PERRL, EOMI	  Lymphatic: No lymphadenopathy  Cardiovascular: Normal S1 S2, No JVD, No murmurs, No edema  Respiratory: Lungs clear to auscultation	  Psychiatry: A & O x 3, Mood & affect appropriate  Gastrointestinal:  Soft, Non-tender, + BS	  Skin: No rashes, No ecchymoses, No cyanosis	  Neurologic: Non-focal  Extremities: Normal range of motion, No clubbing, cyanosis or edema  Vascular: Peripheral pulses palpable 2+ bilaterally    MEDICATIONS  (STANDING):  albuterol    90 MICROgram(s) HFA Inhaler 2 Puff(s) Inhalation every 6 hours  albuterol/ipratropium for Nebulization 3 milliLiter(s) Nebulizer every 6 hours  anastrozole 1 milliGRAM(s) Oral daily  apixaban 5 milliGRAM(s) Oral two times a day  artificial  tears Solution 2 Drop(s) Both EYES four times a day  atorvastatin 10 milliGRAM(s) Oral at bedtime  cefTRIAXone   IVPB 1000 milliGRAM(s) IV Intermittent every 24 hours  fluticasone propionate/ salmeterol 100-50 MICROgram(s) Diskus 1 Dose(s) Inhalation two times a day  methylPREDNISolone sodium succinate Injectable 40 milliGRAM(s) IV Push every 12 hours  metoprolol tartrate 12.5 milliGRAM(s) Oral two times a day  montelukast 10 milliGRAM(s) Oral daily  pantoprazole    Tablet 40 milliGRAM(s) Oral before breakfast    	  	  LABS:	 	      Troponin I, High Sensitivity Result: 30.2 ng/L (09-28 @ 06:48)                            11.9   10.57 )-----------( 219      ( 28 Sep 2024 06:45 )             37.9     09-28    138  |  99  |  32[H]  ----------------------------<  150[H]  4.8   |  36[H]  |  0.84    Ca    8.8      28 Sep 2024 06:45  Phos  2.7     09-28  Mg     2.5     09-28    TPro  7.6  /  Alb  3.0[L]  /  TBili  1.0  /  DBili  x   /  AST  24  /  ALT  36  /  AlkPhos  118  09-28      Lipid Profile: Cholesterol 136  LDL --  HDL 66  TG 46  Ldl calc 61

## 2024-09-28 NOTE — PROGRESS NOTE ADULT - PROBLEM SELECTOR PLAN 6
hx of htn on carvedilol  - c/w lopressor as per cardio  - monitor for hemodynamic instability TTE pulmonary artery systolic pressure is 69 mmHg  consistent with severe pulmonary hypertension  pulm Dr. Son following  - c/w bronchodilators hx of chronic Afib  BNP 1147, trop neg  TTE: dilated LA, LVEF 57%, increased LV mass and concentric hypertrophy, enlarged RV cavity size and reduced RV systolic function  CHADSVASC score of 5 with stroke risk of 7.2%  Dr. Siu following   not on AC  - lopressor for rate control  - tele monitoring - Goal rate <110 hx of chronic Afib  BNP 1147, trop neg  TTE: : severe pHTN, EF 57%, RV reduced systolic function  CHADSVASC score of 5 with stroke risk of 7.2%  Dr. Siu following   not on AC  - lopressor for rate control  - tele monitoring - Goal rate <110

## 2024-09-28 NOTE — PROGRESS NOTE ADULT - PROBLEM SELECTOR PLAN 3
p/w SOB and found to have hypercapnic resp failure   CXR: Rt UL infiltrate concerning for PNA. Prominent cardiac silhouette with pulmonary venous engorgement but   without malcom pulmonary edema. Mild subsegmental atelectasis versus scarring in the Lt lower lobe.  s/p rocephin and azithromycin   RVP neg  ID Dr. Denton following  strep neg, legionella neg, mycoplasma neg  BCx neg NGTD @ 48 hrs  - c/w azithro 500mg qd + rocephin 1g qd p/w SOB and found to have hypercapnic resp failure   CXR: Rt UL infiltrate concerning for PNA. Prominent cardiac silhouette w/ pulmonary venous engorgement but   w/o malcom pulmonary edema. Mild subsegmental atelectasis versus scarring in the Lt LL  s/p rocephin and azithromycin in ED  RVP neg  ID Dr. Denton following  strep neg, legionella neg, mycoplasma neg  BCx neg NGTD @ 72 hrs  - d/c azithro as legionella neg  - c/w rocephin 1g qd p/w SOB and found to have hypercapnic resp failure   CXR: Rt UL infiltrate concerning for PNA. Prominent cardiac silhouette w/ pulmonary venous engorgement but   w/o malcom pulmonary edema. Mild subsegmental atelectasis versus scarring in the Lt LL  RVP neg, s/p rocephin and azithromycin in ED  ID Dr. Denton following  strep neg, legionella neg, mycoplasma neg  BCx neg NGTD @ 72 hrs  - d/c azithro as legionella neg  - c/w rocephin 1g qd

## 2024-09-28 NOTE — PROGRESS NOTE ADULT - PROBLEM SELECTOR PLAN 4
hx of CHF with last echo 2022, EF 56%  likely not the cause of the patients AHRF but still possible   CXR: Rt UL infiltrate concerning for PNA. Prominent cardiac silhouette with pulmonary venous engorgement but   without malcom pulmonary edema. Mild subsegmental atelectasis versus scarring in the Lt lower lobe.  BNP 1147, trop neg  s/p 60 mg of lasix  cardio Dr. Siu following  pt not in CHF as per cardio, no need for diuretics  - monitor volume status hx of CHF with last echo 2022, EF 56%  CXR: Rt UL infiltrate concerning for PNA. Prominent cardiac silhouette w/ pulmonary venous engorgement but   w/o malcom pulmonary edema. Mild subsegmental atelectasis versus scarring in the Lt LL  BNP 1147, trop neg  s/p 60 mg of lasix  TTE: dilated LA, LVEF 57%, increased LV mass and concentric hypertrophy, enlarged RV cavity size and reduced RV systolic function  cardio Dr. Siu following  pt not in CHF as per cardio, no need for diuretics  pt remains euvolemic, no signs of fluid overload  - monitor volume status UA +ve  UCx E. coli  pt asymptomatic  - c/w rocephin

## 2024-09-28 NOTE — PROGRESS NOTE ADULT - SUBJECTIVE AND OBJECTIVE BOX
PGY-1 Progress Note discussed with attending    PAGER #: [[phone]] TILL 5:00 PM  PLEASE CONTACT ON CALL TEAM:  - On Call Team (Please refer to Cassy) FROM 5:00 PM - 8:30PM  - Nightfloat Team FROM 8:30 -7:30 AM    INTERVAL HPI/OVERNIGHT EVENTS:   -     REVIEW OF SYSTEMS:  CONSTITUTIONAL: No fever, weight loss, or fatigue  RESPIRATORY: No cough, wheezing, chills or hemoptysis; No shortness of breath  CARDIOVASCULAR: No chest pain, palpitations, dizziness, or leg swelling  GASTROINTESTINAL: No abdominal pain. No nausea, vomiting, or hematemesis; No diarrhea or constipation. No melena or hematochezia.  GENITOURINARY: No dysuria or hematuria, urinary frequency  NEUROLOGICAL: No headaches, memory loss, loss of strength, numbness, or tremors  SKIN: No itching, burning, rashes, or lesions     MEDICATIONS  (STANDING):  albuterol    90 MICROgram(s) HFA Inhaler 2 Puff(s) Inhalation every 6 hours  albuterol/ipratropium for Nebulization 3 milliLiter(s) Nebulizer every 6 hours  anastrozole 1 milliGRAM(s) Oral daily  apixaban 5 milliGRAM(s) Oral two times a day  artificial  tears Solution 2 Drop(s) Both EYES four times a day  atorvastatin 10 milliGRAM(s) Oral at bedtime  azithromycin  IVPB 500 milliGRAM(s) IV Intermittent every 24 hours  cefTRIAXone   IVPB 1000 milliGRAM(s) IV Intermittent every 24 hours  fluticasone propionate/ salmeterol 100-50 MICROgram(s) Diskus 1 Dose(s) Inhalation two times a day  methylPREDNISolone sodium succinate Injectable 40 milliGRAM(s) IV Push every 12 hours  metoprolol tartrate 12.5 milliGRAM(s) Oral two times a day  montelukast 10 milliGRAM(s) Oral daily  pantoprazole    Tablet 40 milliGRAM(s) Oral before breakfast    MEDICATIONS  (PRN):      Vital Signs Last 24 Hrs  T(C): 36.7 (28 Sep 2024 07:26), Max: 37.2 (27 Sep 2024 21:05)  T(F): 98 (28 Sep 2024 07:26), Max: 99 (27 Sep 2024 21:05)  HR: 77 (28 Sep 2024 07:26) (76 - 91)  BP: 134/98 (28 Sep 2024 07:26) (97/72 - 141/96)  BP(mean): --  RR: 18 (28 Sep 2024 07:26) (18 - 19)  SpO2: 98% (28 Sep 2024 07:26) (97% - 100%)    Parameters below as of 28 Sep 2024 07:26  Patient On (Oxygen Delivery Method): nasal cannula  O2 Flow (L/min): 3      PHYSICAL EXAMINATION:  GENERAL: NAD, lying in bed comfortably. AAOx  NERVOUS SYSTEM: Speech clear. No deficits   HEAD:  Atraumatic, Normocephalic  EYES: EOMI, PERRLA, conjunctiva and sclera clear  ENT: Moist mucous membranes  NECK: Supple, No JVD, normal thyroid  CHEST/LUNG: Clear to auscultation bilaterally; no rales, rhonchi, wheezing, or rubs. No unlabored respirations   HEART: Regular rate and rhythm; No murmurs, rubs, or gallops  ABDOMEN: Bowel sounds present; Soft, nontender, nondistended.   EXTREMITIES:  2+ Peripheral Pulses, brisk capillary refill. No clubbing, cyanosis, or edema  MSK: FROM all 4 extremities, full and equal strength  SKIN: Warm dry. No rashes or lesions                          11.9   10.57 )-----------( 219      ( 28 Sep 2024 06:45 )             37.9     09-28    138  |  99  |  32[H]  ----------------------------<  150[H]  4.8   |  36[H]  |  0.84    Ca    8.8      28 Sep 2024 06:45  Phos  2.7     09-28  Mg     2.5     09-28    TPro  7.6  /  Alb  3.0[L]  /  TBili  1.0  /  DBili  x   /  AST  24  /  ALT  36  /  AlkPhos  118  09-28    LIVER FUNCTIONS - ( 28 Sep 2024 06:45 )  Alb: 3.0 g/dL / Pro: 7.6 g/dL / ALK PHOS: 118 U/L / ALT: 36 U/L DA / AST: 24 U/L / GGT: x                     CAPILLARY BLOOD GLUCOSE          RADIOLOGY & ADDITIONAL TESTS:                   PGY-1 Progress Note discussed with attending    PAGER #: [[phone]] TILL 5:00 PM  PLEASE CONTACT ON CALL TEAM:  - On Call Team (Please refer to Cassy) FROM 5:00 PM - 8:30PM  - Nightfloat Team FROM 8:30 -7:30 AM    INTERVAL HPI/OVERNIGHT EVENTS:   - No acute events overnight.  Patient examined at bedside this AM.  Patient denies acute complaints    REVIEW OF SYSTEMS:  CONSTITUTIONAL: No fever, weight loss, or fatigue  RESPIRATORY: No cough, wheezing, chills or hemoptysis; No shortness of breath  CARDIOVASCULAR: No chest pain, palpitations, dizziness, or leg swelling  GASTROINTESTINAL: No abdominal pain. No nausea, vomiting, or hematemesis; No diarrhea or constipation.   GENITOURINARY: No dysuria or hematuria, urinary frequency  NEUROLOGICAL: No headaches, memory loss, loss of strength, numbness, or tremors  SKIN: No itching, burning, rashes, or lesions     MEDICATIONS  (STANDING):  albuterol    90 MICROgram(s) HFA Inhaler 2 Puff(s) Inhalation every 6 hours  albuterol/ipratropium for Nebulization 3 milliLiter(s) Nebulizer every 6 hours  anastrozole 1 milliGRAM(s) Oral daily  apixaban 5 milliGRAM(s) Oral two times a day  artificial  tears Solution 2 Drop(s) Both EYES four times a day  atorvastatin 10 milliGRAM(s) Oral at bedtime  azithromycin  IVPB 500 milliGRAM(s) IV Intermittent every 24 hours  cefTRIAXone   IVPB 1000 milliGRAM(s) IV Intermittent every 24 hours  fluticasone propionate/ salmeterol 100-50 MICROgram(s) Diskus 1 Dose(s) Inhalation two times a day  methylPREDNISolone sodium succinate Injectable 40 milliGRAM(s) IV Push every 12 hours  metoprolol tartrate 12.5 milliGRAM(s) Oral two times a day  montelukast 10 milliGRAM(s) Oral daily  pantoprazole    Tablet 40 milliGRAM(s) Oral before breakfast    MEDICATIONS  (PRN):      Vital Signs Last 24 Hrs  T(C): 36.7 (28 Sep 2024 07:26), Max: 37.2 (27 Sep 2024 21:05)  T(F): 98 (28 Sep 2024 07:26), Max: 99 (27 Sep 2024 21:05)  HR: 77 (28 Sep 2024 07:26) (76 - 91)  BP: 134/98 (28 Sep 2024 07:26) (97/72 - 141/96)  BP(mean): --  RR: 18 (28 Sep 2024 07:26) (18 - 19)  SpO2: 98% (28 Sep 2024 07:26) (97% - 100%)    Parameters below as of 28 Sep 2024 07:26  Patient On (Oxygen Delivery Method): nasal cannula  O2 Flow (L/min): 3      PHYSICAL EXAMINATION:  GENERAL: NAD, lying in bed comfortably. AAOx3  NERVOUS SYSTEM: Speech clear. No deficits   HEAD:  Atraumatic, Normocephalic  EYES: conjunctiva and sclera clear  ENT: Moist mucous membranes  NECK: Supple  CHEST/LUNG: Clear to auscultation bilaterally; no rales, rhonchi, wheezing, or rubs. No unlabored respirations   HEART: Regular rate and rhythm; No murmurs, rubs, or gallops  ABDOMEN: Bowel sounds present; Soft, nontender, nondistended.   EXTREMITIES:  2+ Peripheral Pulses, brisk capillary refill. No clubbing, cyanosis, or edema  SKIN: Warm dry. No rashes or lesions                          11.9   10.57 )-----------( 219      ( 28 Sep 2024 06:45 )             37.9     09-28    138  |  99  |  32[H]  ----------------------------<  150[H]  4.8   |  36[H]  |  0.84    Ca    8.8      28 Sep 2024 06:45  Phos  2.7     09-28  Mg     2.5     09-28    TPro  7.6  /  Alb  3.0[L]  /  TBili  1.0  /  DBili  x   /  AST  24  /  ALT  36  /  AlkPhos  118  09-28    LIVER FUNCTIONS - ( 28 Sep 2024 06:45 )  Alb: 3.0 g/dL / Pro: 7.6 g/dL / ALK PHOS: 118 U/L / ALT: 36 U/L DA / AST: 24 U/L / GGT: x                     CAPILLARY BLOOD GLUCOSE          RADIOLOGY & ADDITIONAL TESTS:                   PGY-1 Progress Note discussed with attending    PAGER #: [[phone]] TILL 5:00 PM  PLEASE CONTACT ON CALL TEAM:  - On Call Team (Please refer to Cassy) FROM 5:00 PM - 8:30PM  - Nightfloat Team FROM 8:30 -7:30 AM    INTERVAL HPI/OVERNIGHT EVENTS:   - No acute events overnight.  Patient examined at bedside this AM.  Patient denies acute complaints. Off BiPAP, still on 2L NC.     REVIEW OF SYSTEMS:  CONSTITUTIONAL: No fever, weight loss, or fatigue  RESPIRATORY: No cough, wheezing, chills or hemoptysis; No shortness of breath  CARDIOVASCULAR: No chest pain, palpitations, dizziness, or leg swelling  GASTROINTESTINAL: No abdominal pain. No nausea, vomiting, or hematemesis; No diarrhea or constipation.   GENITOURINARY: No dysuria or hematuria, urinary frequency  NEUROLOGICAL: No headaches, memory loss, loss of strength, numbness, or tremors  SKIN: No itching, burning, rashes, or lesions     MEDICATIONS  (STANDING):  albuterol    90 MICROgram(s) HFA Inhaler 2 Puff(s) Inhalation every 6 hours  albuterol/ipratropium for Nebulization 3 milliLiter(s) Nebulizer every 6 hours  anastrozole 1 milliGRAM(s) Oral daily  apixaban 5 milliGRAM(s) Oral two times a day  artificial  tears Solution 2 Drop(s) Both EYES four times a day  atorvastatin 10 milliGRAM(s) Oral at bedtime  azithromycin  IVPB 500 milliGRAM(s) IV Intermittent every 24 hours  cefTRIAXone   IVPB 1000 milliGRAM(s) IV Intermittent every 24 hours  fluticasone propionate/ salmeterol 100-50 MICROgram(s) Diskus 1 Dose(s) Inhalation two times a day  methylPREDNISolone sodium succinate Injectable 40 milliGRAM(s) IV Push every 12 hours  metoprolol tartrate 12.5 milliGRAM(s) Oral two times a day  montelukast 10 milliGRAM(s) Oral daily  pantoprazole    Tablet 40 milliGRAM(s) Oral before breakfast    MEDICATIONS  (PRN):      Vital Signs Last 24 Hrs  T(C): 36.7 (28 Sep 2024 07:26), Max: 37.2 (27 Sep 2024 21:05)  T(F): 98 (28 Sep 2024 07:26), Max: 99 (27 Sep 2024 21:05)  HR: 77 (28 Sep 2024 07:26) (76 - 91)  BP: 134/98 (28 Sep 2024 07:26) (97/72 - 141/96)  BP(mean): --  RR: 18 (28 Sep 2024 07:26) (18 - 19)  SpO2: 98% (28 Sep 2024 07:26) (97% - 100%)    Parameters below as of 28 Sep 2024 07:26  Patient On (Oxygen Delivery Method): nasal cannula  O2 Flow (L/min): 3      PHYSICAL EXAMINATION:  GENERAL: NAD, lying in bed comfortably. AAOx3, 2L NC  NERVOUS SYSTEM: Speech clear. No deficits   HEAD:  Atraumatic, Normocephalic  EYES: conjunctiva and sclera clear  ENT: Moist mucous membranes  NECK: Supple  CHEST/LUNG: Clear to auscultation bilaterally; no rales, rhonchi, wheezing, or rubs. No unlabored respirations   HEART: Regular rate and rhythm; No murmurs, rubs, or gallops  ABDOMEN: Bowel sounds present; Soft, nontender, nondistended.   EXTREMITIES:  2+ Peripheral Pulses, brisk capillary refill. No clubbing, cyanosis, or edema  SKIN: Warm dry. No rashes or lesions                          11.9   10.57 )-----------( 219      ( 28 Sep 2024 06:45 )             37.9     09-28    138  |  99  |  32[H]  ----------------------------<  150[H]  4.8   |  36[H]  |  0.84    Ca    8.8      28 Sep 2024 06:45  Phos  2.7     09-28  Mg     2.5     09-28    TPro  7.6  /  Alb  3.0[L]  /  TBili  1.0  /  DBili  x   /  AST  24  /  ALT  36  /  AlkPhos  118  09-28    LIVER FUNCTIONS - ( 28 Sep 2024 06:45 )  Alb: 3.0 g/dL / Pro: 7.6 g/dL / ALK PHOS: 118 U/L / ALT: 36 U/L DA / AST: 24 U/L / GGT: x                     CAPILLARY BLOOD GLUCOSE          RADIOLOGY & ADDITIONAL TESTS:

## 2024-09-29 LAB
ALBUMIN SERPL ELPH-MCNC: 3 G/DL — LOW (ref 3.5–5)
ALP SERPL-CCNC: 109 U/L — SIGNIFICANT CHANGE UP (ref 40–120)
ALT FLD-CCNC: 39 U/L DA — SIGNIFICANT CHANGE UP (ref 10–60)
ANION GAP SERPL CALC-SCNC: 1 MMOL/L — LOW (ref 5–17)
ANION GAP SERPL CALC-SCNC: 5 MMOL/L — SIGNIFICANT CHANGE UP (ref 5–17)
AST SERPL-CCNC: 41 U/L — HIGH (ref 10–40)
BILIRUB SERPL-MCNC: 0.9 MG/DL — SIGNIFICANT CHANGE UP (ref 0.2–1.2)
BUN SERPL-MCNC: 22 MG/DL — HIGH (ref 7–18)
BUN SERPL-MCNC: 22 MG/DL — HIGH (ref 7–18)
CALCIUM SERPL-MCNC: 8.9 MG/DL — SIGNIFICANT CHANGE UP (ref 8.4–10.5)
CALCIUM SERPL-MCNC: 8.9 MG/DL — SIGNIFICANT CHANGE UP (ref 8.4–10.5)
CHLORIDE SERPL-SCNC: 98 MMOL/L — SIGNIFICANT CHANGE UP (ref 96–108)
CHLORIDE SERPL-SCNC: 99 MMOL/L — SIGNIFICANT CHANGE UP (ref 96–108)
CO2 SERPL-SCNC: 31 MMOL/L — SIGNIFICANT CHANGE UP (ref 22–31)
CO2 SERPL-SCNC: 36 MMOL/L — HIGH (ref 22–31)
CREAT SERPL-MCNC: 0.82 MG/DL — SIGNIFICANT CHANGE UP (ref 0.5–1.3)
CREAT SERPL-MCNC: 0.88 MG/DL — SIGNIFICANT CHANGE UP (ref 0.5–1.3)
CULTURE RESULTS: SIGNIFICANT CHANGE UP
CULTURE RESULTS: SIGNIFICANT CHANGE UP
EGFR: 62 ML/MIN/1.73M2 — SIGNIFICANT CHANGE UP
EGFR: 67 ML/MIN/1.73M2 — SIGNIFICANT CHANGE UP
GLUCOSE SERPL-MCNC: 147 MG/DL — HIGH (ref 70–99)
GLUCOSE SERPL-MCNC: 195 MG/DL — HIGH (ref 70–99)
HCT VFR BLD CALC: 39.8 % — SIGNIFICANT CHANGE UP (ref 34.5–45)
HGB BLD-MCNC: 12.5 G/DL — SIGNIFICANT CHANGE UP (ref 11.5–15.5)
M PNEUMO DNA SPEC QL NAA+PROBE: NEGATIVE — SIGNIFICANT CHANGE UP
MAGNESIUM SERPL-MCNC: 2.6 MG/DL — SIGNIFICANT CHANGE UP (ref 1.6–2.6)
MCHC RBC-ENTMCNC: 30.4 PG — SIGNIFICANT CHANGE UP (ref 27–34)
MCHC RBC-ENTMCNC: 31.4 GM/DL — LOW (ref 32–36)
MCV RBC AUTO: 96.8 FL — SIGNIFICANT CHANGE UP (ref 80–100)
NRBC # BLD: 0 /100 WBCS — SIGNIFICANT CHANGE UP (ref 0–0)
PHOSPHATE SERPL-MCNC: 2.7 MG/DL — SIGNIFICANT CHANGE UP (ref 2.5–4.5)
PLATELET # BLD AUTO: 215 K/UL — SIGNIFICANT CHANGE UP (ref 150–400)
POTASSIUM SERPL-MCNC: 5.9 MMOL/L — HIGH (ref 3.5–5.3)
POTASSIUM SERPL-MCNC: 6.3 MMOL/L — CRITICAL HIGH (ref 3.5–5.3)
POTASSIUM SERPL-SCNC: 5.9 MMOL/L — HIGH (ref 3.5–5.3)
POTASSIUM SERPL-SCNC: 6.3 MMOL/L — CRITICAL HIGH (ref 3.5–5.3)
PROT SERPL-MCNC: 7.9 G/DL — SIGNIFICANT CHANGE UP (ref 6–8.3)
RBC # BLD: 4.11 M/UL — SIGNIFICANT CHANGE UP (ref 3.8–5.2)
RBC # FLD: 14.3 % — SIGNIFICANT CHANGE UP (ref 10.3–14.5)
SODIUM SERPL-SCNC: 134 MMOL/L — LOW (ref 135–145)
SODIUM SERPL-SCNC: 136 MMOL/L — SIGNIFICANT CHANGE UP (ref 135–145)
SPECIMEN SOURCE: SIGNIFICANT CHANGE UP
WBC # BLD: 7.81 K/UL — SIGNIFICANT CHANGE UP (ref 3.8–10.5)
WBC # FLD AUTO: 7.81 K/UL — SIGNIFICANT CHANGE UP (ref 3.8–10.5)

## 2024-09-29 RX ORDER — CEFTRIAXONE SODIUM 1 G
1000 VIAL (EA) INJECTION EVERY 24 HOURS
Refills: 0 | Status: DISCONTINUED | OUTPATIENT
Start: 2024-09-30 | End: 2024-09-30

## 2024-09-29 RX ADMIN — Medication 2 DROP(S): at 13:21

## 2024-09-29 RX ADMIN — ATORVASTATIN CALCIUM 10 MILLIGRAM(S): 10 TABLET, FILM COATED ORAL at 21:47

## 2024-09-29 RX ADMIN — Medication 1 DOSE(S): at 10:12

## 2024-09-29 RX ADMIN — IPRATROPIUM BROMIDE AND ALBUTEROL SULFATE 3 MILLILITER(S): .5; 3 SOLUTION RESPIRATORY (INHALATION) at 08:52

## 2024-09-29 RX ADMIN — Medication 100 MILLIGRAM(S): at 07:52

## 2024-09-29 RX ADMIN — IPRATROPIUM BROMIDE AND ALBUTEROL SULFATE 3 MILLILITER(S): .5; 3 SOLUTION RESPIRATORY (INHALATION) at 03:35

## 2024-09-29 RX ADMIN — IPRATROPIUM BROMIDE AND ALBUTEROL SULFATE 3 MILLILITER(S): .5; 3 SOLUTION RESPIRATORY (INHALATION) at 14:15

## 2024-09-29 RX ADMIN — MONTELUKAST SODIUM 10 MILLIGRAM(S): 10 TABLET, FILM COATED ORAL at 13:21

## 2024-09-29 RX ADMIN — APIXABAN 5 MILLIGRAM(S): 5 TABLET, FILM COATED ORAL at 07:21

## 2024-09-29 RX ADMIN — Medication 2 DROP(S): at 22:00

## 2024-09-29 RX ADMIN — Medication 12.5 MILLIGRAM(S): at 17:49

## 2024-09-29 RX ADMIN — Medication 2 DROP(S): at 07:21

## 2024-09-29 RX ADMIN — PANTOPRAZOLE SODIUM 40 MILLIGRAM(S): 40 TABLET, DELAYED RELEASE ORAL at 07:22

## 2024-09-29 RX ADMIN — APIXABAN 5 MILLIGRAM(S): 5 TABLET, FILM COATED ORAL at 17:49

## 2024-09-29 RX ADMIN — METHYLPREDNISOLONE ACETATE 40 MILLIGRAM(S): 80 INJECTION, SUSPENSION INTRA-ARTICULAR; INTRALESIONAL; INTRAMUSCULAR; SOFT TISSUE at 07:21

## 2024-09-29 RX ADMIN — Medication 12.5 MILLIGRAM(S): at 07:21

## 2024-09-29 RX ADMIN — ANASTROZOLE 1 MILLIGRAM(S): 1 TABLET ORAL at 13:21

## 2024-09-29 RX ADMIN — IPRATROPIUM BROMIDE AND ALBUTEROL SULFATE 3 MILLILITER(S): .5; 3 SOLUTION RESPIRATORY (INHALATION) at 20:53

## 2024-09-29 RX ADMIN — Medication 2 DROP(S): at 17:48

## 2024-09-29 RX ADMIN — METHYLPREDNISOLONE ACETATE 40 MILLIGRAM(S): 80 INJECTION, SUSPENSION INTRA-ARTICULAR; INTRALESIONAL; INTRAMUSCULAR; SOFT TISSUE at 17:48

## 2024-09-29 NOTE — PROGRESS NOTE ADULT - ASSESSMENT
89 yo F from home, AAOx0 , HHA 7 hours a day  5 days a week, ambulates with walker, with morbid obesity, COPD (home O2 2L, noncompliant with cpap, multiple hospitalizations at [facility] for COPD exacerbation under MRN [id number]), HFpEF (EF 55-60%), severe RV HTN,Chronic Afib and HLD  presenting with complaint of lethargy,acute respiratory failure with CO2 retention and RUL pneumonia.  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  2.Afib-NOAC,lopressor.  3.RUL pneumonia-abx.  4.COPD and CO2 retention-off Bipap,Pulm f/u,steroids.  5.Pt not in CHF,no need for diuretics.  6.PPI.

## 2024-09-29 NOTE — PROGRESS NOTE ADULT - ASSESSMENT
Patient is a 89y old  Female who is from home, AAOx0 , HHA 7 hours a day  5 days a week, ambulates with walker, with morbid obesity, COPD (home O2 2L, noncompliant with cpap, multiple hospitalizations at [facility] for COPD exacerbation under MRN [id number]), HFpEF (EF 55-60%), severe RV HTN and HLD, now presents to the ER for evaluation of lethargy. Collateral info obtained from granddaughter and daughter Sylvia at bedside. Sylvia lives with the patient. Say that pt was in her usual state of health , had a drink of kenyon and was feeling well, until about 3pm on day of presentation when she started saying she was feeling unwell. They took her oxygen at home and it was 50s and then went back up to 80s They called EMS, she was put on CPAP and given 2 doses of SL nitro. In the ED pt was lethargic, encephalopathic, minimally conversant. As per family this is not her baseline. Family admits that pt has not been on her home cpap in a "very long time." On admission, she found to have no fever, but positive Urine analysis and RUL infiltrate. She has started on Ceftriaxone and Azithromycin, and the ID consult requested to assist with further evaluation and antibiotic management.    # Pneumonia - Legionella urine Ag is negative  # Acute Respiratory Failure - on BIPAP transition to NC  # UTI - Urine cx grew E.coli    would recommend:    1. OOB to chair  2. Continue Ceftriaxone until 9/30/24  3. Aspiration precaution  4. Supplemental oxygenation and bronchodilator as needed    Attending Attestation:     Spent more than 35 minutes on total encounter, more than 50 % of the visit was spent counseling and/or coordinating care by the Attending physician.

## 2024-09-29 NOTE — PROGRESS NOTE ADULT - SUBJECTIVE AND OBJECTIVE BOX
Date of Service 09-29-24 @ 11:53    CHIEF COMPLAINT:Patient is a 89y old  Female who presents with a chief complaint of COPD vs CHF exacerbation.Pt appears comfortable.    	  REVIEW OF SYSTEMS:  CONSTITUTIONAL: No fever, weight loss, or fatigue  EYES: No eye pain, visual disturbances, or discharge  ENT:  No difficulty hearing, tinnitus, vertigo; No sinus or throat pain  NECK: No pain or stiffness  RESPIRATORY: No cough, wheezing, chills or hemoptysis; No Shortness of Breath  CARDIOVASCULAR: No chest pain, palpitations, passing out, dizziness, or leg swelling  GASTROINTESTINAL: No abdominal or epigastric pain. No nausea, vomiting, or hematemesis; No diarrhea or constipation. No melena or hematochezia.  GENITOURINARY: No dysuria, frequency, hematuria, or incontinence  NEUROLOGICAL: No headaches, memory loss, loss of strength, numbness, or tremors  SKIN: No itching, burning, rashes, or lesions   LYMPH Nodes: No enlarged glands  ENDOCRINE: No heat or cold intolerance; No hair loss  MUSCULOSKELETAL: No joint pain or swelling; No muscle, back, or extremity pain  PSYCHIATRIC: No depression, anxiety, mood swings, or difficulty sleeping  HEME/LYMPH: No easy bruising, or bleeding gums  ALLERGY AND IMMUNOLOGIC: No hives or eczema	        PHYSICAL EXAM:  T(C): 36.8 (09-29-24 @ 05:42), Max: 37 (09-28-24 @ 15:51)  HR: 66 (09-29-24 @ 05:42) (60 - 95)  BP: 158/90 (09-29-24 @ 05:42) (110/75 - 158/90)  RR: 18 (09-29-24 @ 05:42) (17 - 18)  SpO2: 100% (09-29-24 @ 05:42) (98% - 100%)  Wt(kg): --  I&O's Summary    28 Sep 2024 07:01  -  29 Sep 2024 07:00  --------------------------------------------------------  IN: 450 mL / OUT: 650 mL / NET: -200 mL        Appearance: Normal	  HEENT:   Normal oral mucosa, PERRL, EOMI	  Lymphatic: No lymphadenopathy  Cardiovascular: Normal S1 S2, No JVD, No murmurs, No edema  Respiratory: Lungs clear to auscultation	  Psychiatry: A & O x 3, Mood & affect appropriate  Gastrointestinal:  Soft, Non-tender, + BS	  Skin: No rashes, No ecchymoses, No cyanosis	  Neurologic: Non-focal  Extremities: Normal range of motion, No clubbing, cyanosis or edema  Vascular: Peripheral pulses palpable 2+ bilaterally    MEDICATIONS  (STANDING):  albuterol    90 MICROgram(s) HFA Inhaler 2 Puff(s) Inhalation every 6 hours  albuterol/ipratropium for Nebulization 3 milliLiter(s) Nebulizer every 6 hours  anastrozole 1 milliGRAM(s) Oral daily  apixaban 5 milliGRAM(s) Oral two times a day  artificial  tears Solution 2 Drop(s) Both EYES four times a day  atorvastatin 10 milliGRAM(s) Oral at bedtime  fluticasone propionate/ salmeterol 100-50 MICROgram(s) Diskus 1 Dose(s) Inhalation two times a day  methylPREDNISolone sodium succinate Injectable 40 milliGRAM(s) IV Push every 12 hours  metoprolol tartrate 12.5 milliGRAM(s) Oral two times a day  montelukast 10 milliGRAM(s) Oral daily  pantoprazole    Tablet 40 milliGRAM(s) Oral before breakfast      	  	  LABS:	 	    Troponin I, High Sensitivity Result: 30.2 ng/L (09-28 @ 06:48)                            12.5   7.81  )-----------( 215      ( 29 Sep 2024 07:30 )             39.8     09-29    136  |  99  |  22[H]  ----------------------------<  147[H]  6.3[HH]   |  36[H]  |  0.82    Ca    8.9      29 Sep 2024 07:30  Phos  2.7     09-29  Mg     2.6     09-29    TPro  7.9  /  Alb  3.0[L]  /  TBili  0.9  /  DBili  x   /  AST  41[H]  /  ALT  39  /  AlkPhos  109  09-29      Lipid Profile: Cholesterol 136  HDL 66  TG 46  Ldl calc 61

## 2024-09-29 NOTE — PROGRESS NOTE ADULT - SUBJECTIVE AND OBJECTIVE BOX
Patient is seen and examined at the bed side, is afebrile. No new events. The Family at the bed side.      REVIEW OF SYSTEMS: All other review systems are negative      ALLERGY: Allergy Status Unknown      Vital Signs Last 24 Hrs  T(C): 36.9 (29 Sep 2024 13:24), Max: 36.9 (28 Sep 2024 19:05)  T(F): 98.4 (29 Sep 2024 13:24), Max: 98.4 (28 Sep 2024 19:05)  HR: 84 (29 Sep 2024 13:24) (60 - 84)  BP: 153/89 (29 Sep 2024 13:24) (110/75 - 158/90)  BP(mean): 109 (29 Sep 2024 13:24) (101 - 109)  RR: 17 (29 Sep 2024 13:24) (17 - 18)  SpO2: 95% (29 Sep 2024 13:24) (95% - 100%)    Parameters below as of 29 Sep 2024 13:24  Patient On (Oxygen Delivery Method): nasal cannula  O2 Flow (L/min): 2        PHYSICAL EXAM:  GENERAL: Not in distress   CHEST/LUNG:  Not using accessory muscles   HEART: s1 and s2 present  ABDOMEN:  Nontender and  Nondistended  EXTREMITIES: No pedal  edema  CNS: Awake and Alert      LABS:                        12.5   7.81  )-----------( 215      ( 29 Sep 2024 07:30 )             39.8                           11.9   10.57 )-----------( 219      ( 28 Sep 2024 06:45 )             37.9         09-29    134[L]  |  98  |  22[H]  ----------------------------<  195[H]  5.9[H]   |  31  |  0.88    Ca    8.9      29 Sep 2024 11:00  Phos  2.7     09-29  Mg     2.6     09-29    TPro  7.9  /  Alb  3.0[L]  /  TBili  0.9  /  DBili  x   /  AST  41[H]  /  ALT  39  /  AlkPhos  109  09-29 09-28    138  |  99  |  32[H]  ----------------------------<  150[H]  4.8   |  36[H]  |  0.84    Ca    8.8      28 Sep 2024 06:45  Phos  2.7     09-28  Mg     2.5     09-28    TPro  7.6  /  Alb  3.0[L]  /  TBili  1.0  /  DBili  x   /  AST  24  /  ALT  36  /  AlkPhos  118  09-28    PT/INR - ( 24 Sep 2024 17:00 )   PT: 21.6 sec;   INR: 1.86 ratio       PTT - ( 24 Sep 2024 17:00 )  PTT:41.7 sec      POCT Blood Glucose.: 165 mg/dL (24 Sep 2024 16:46)  ABG - ( 25 Sep 2024 04:06 )  pH, Arterial: 7.29  pH, Blood: x     /  pCO2: 74    /  pO2: 73    / HCO3: 36    / Base Excess: 6.3   /  SaO2: 93            MEDICATIONS  (STANDING):    albuterol    90 MICROgram(s) HFA Inhaler 2 Puff(s) Inhalation every 6 hours  albuterol/ipratropium for Nebulization 3 milliLiter(s) Nebulizer every 6 hours  anastrozole 1 milliGRAM(s) Oral daily  apixaban 5 milliGRAM(s) Oral two times a day  artificial  tears Solution 2 Drop(s) Both EYES four times a day  atorvastatin 10 milliGRAM(s) Oral at bedtime  fluticasone propionate/ salmeterol 100-50 MICROgram(s) Diskus 1 Dose(s) Inhalation two times a day  methylPREDNISolone sodium succinate Injectable 40 milliGRAM(s) IV Push every 12 hours  metoprolol tartrate 12.5 milliGRAM(s) Oral two times a day  montelukast 10 milliGRAM(s) Oral daily  pantoprazole    Tablet 40 milliGRAM(s) Oral before breakfast        RADIOLOGY & ADDITIONAL TESTS:    9/24/24: Xray Chest 1 View-PORTABLE IMMEDIATE (Xray Chest 1 View-PORTABLE IMMEDIATE .) (09.24.24 @ 17:22) >  1. Right upper lobe infiltrate concerning for pneumonia.  2. Prominent cardiac silhouette with pulmonary venous engorgement but without malcom pulmonary edema.  3. Mild subsegmental atelectasis versus scarring in the left lower lobe.      MICROBIOLOGY DATA:    Culture - Urine (09.24.24 @ 21:30)   - Amoxicillin/Clavulanic Acid: S <=8/4  - Ampicillin: S <=8 These ampicillin results predict results for amoxicillin  - Ampicillin/Sulbactam: S <=4/2  - Aztreonam: S <=4  - Cefazolin: S <=2 For uncomplicated UTI with K. pneumoniae, E. coli, or P. mirablis: NIDIA <=16 is sensitive and NIDIA >=32 is resistant. This also predicts results for oral agents cefaclor, cefdinir, cefpodoxime, cefprozil, cefuroxime axetil, cephalexin and locarbef for uncomplicated UTI. Note that some isolates may be susceptible to these agents while testing resistant to cefazolin.  - Cefepime: S <=2  - Cefoxitin: S <=8  - Ceftriaxone: S <=1  - Cefuroxime: S <=4  - Ciprofloxacin: R >2  - Ertapenem: S <=0.5  - Gentamicin: S <=2  - Imipenem: S <=1  - Levofloxacin: R >4  - Meropenem: S <=1  - Nitrofurantoin: S <=32 Should not be used to treat pyelonephritis  - Piperacillin/Tazobactam: SDD 16  - Tobramycin: S <=2  - Trimethoprim/Sulfamethoxazole: R >2/38  Specimen Source: Clean Catch  Culture Results:   10,000 - 49,000 CFU/mL Escherichia coli   <10,000 CFU/ml Normal Urogenital cas present  Organism Identification: Escherichia coli  Organism: Escherichia coli  Method Type: NIDIA    Legionella pneumophila Antigen, Urine (09.25.24 @ 07:21)   Legionella Antigen, Urine: Negative    Culture - Urine (09.24.24 @ 21:30)   Specimen Source: Clean Catch  Culture Results:   10,000 - 49,000 CFU/mL Escherichia coli   <10,000 CFU/ml Normal Urogenital cas present    Culture - Blood (09.24.24 @ 17:00)   Specimen Source: .Blood Blood-Peripheral  Culture Results: No growth at 24 hours    Culture - Blood (09.24.24 @ 16:50)   Specimen Source: .Blood Blood-Peripheral  Culture Results: No growth at 24 hours    Urine Microscopic-Add On (NC) (09.24.24 @ 21:30)   Red Blood Cell - Urine: 8 /HPF  White Blood Cell - Urine: 25 /HPF  Bacteria: Moderate /HPF  Squamous Epithelial Cells: Present

## 2024-09-29 NOTE — PROGRESS NOTE ADULT - ASSESSMENT
89 yo F from home, AAOx0 , HHA 7 hours a day  5 days a week, ambulates with walker, with morbid obesity, COPD (home O2 2L, noncompliant with cpap, multiple hospitalizations at [facility] for COPD exacerbation under MRN [id number]), HFpEF (EF 55-60%), severe RV HTN,Chronic Afib and HLD  presenting with complaint of lethargy,acute respiratory failure with CO2 retention and RUL pneumonia.  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  2.Afib-NOAC,lopressor.  3.RUL pneumonia-abx.  4.COPD and CO2 retention-off Bipap,Pulm f/u,steroids.  5.Pt not in CHF,no need for diuretics.  6.PPI.

## 2024-09-29 NOTE — PROGRESS NOTE ADULT - SUBJECTIVE AND OBJECTIVE BOX
Patient was seen and examined  Patient is a 89y old  Female who presents with a chief complaint of COPD vs CHF exacerbation (28 Sep 2024 15:50)      INTERVAL HPI/OVERNIGHT EVENTS:  T(C): 36.8 (09-29-24 @ 05:42), Max: 37 (09-28-24 @ 15:51)  HR: 66 (09-29-24 @ 05:42) (60 - 95)  BP: 158/90 (09-29-24 @ 05:42) (110/75 - 158/90)  RR: 18 (09-29-24 @ 05:42) (17 - 18)  SpO2: 100% (09-29-24 @ 05:42) (98% - 100%)  Wt(kg): --  I&O's Summary    28 Sep 2024 07:01  -  29 Sep 2024 07:00  --------------------------------------------------------  IN: 450 mL / OUT: 650 mL / NET: -200 mL        LABS:                        12.5   7.81  )-----------( 215      ( 29 Sep 2024 07:30 )             39.8     09-29    136  |  99  |  22[H]  ----------------------------<  147[H]  6.3[HH]   |  36[H]  |  0.82    Ca    8.9      29 Sep 2024 07:30  Phos  2.7     09-29  Mg     2.6     09-29    TPro  7.9  /  Alb  3.0[L]  /  TBili  0.9  /  DBili  x   /  AST  41[H]  /  ALT  39  /  AlkPhos  109  09-29      Urinalysis Basic - ( 29 Sep 2024 07:30 )    Color: x / Appearance: x / SG: x / pH: x  Gluc: 147 mg/dL / Ketone: x  / Bili: x / Urobili: x   Blood: x / Protein: x / Nitrite: x   Leuk Esterase: x / RBC: x / WBC x   Sq Epi: x / Non Sq Epi: x / Bacteria: x      CAPILLARY BLOOD GLUCOSE              Urinalysis Basic - ( 29 Sep 2024 07:30 )    Color: x / Appearance: x / SG: x / pH: x  Gluc: 147 mg/dL / Ketone: x  / Bili: x / Urobili: x   Blood: x / Protein: x / Nitrite: x   Leuk Esterase: x / RBC: x / WBC x   Sq Epi: x / Non Sq Epi: x / Bacteria: x        MEDICATIONS  (STANDING):  albuterol    90 MICROgram(s) HFA Inhaler 2 Puff(s) Inhalation every 6 hours  albuterol/ipratropium for Nebulization 3 milliLiter(s) Nebulizer every 6 hours  anastrozole 1 milliGRAM(s) Oral daily  apixaban 5 milliGRAM(s) Oral two times a day  artificial  tears Solution 2 Drop(s) Both EYES four times a day  atorvastatin 10 milliGRAM(s) Oral at bedtime  fluticasone propionate/ salmeterol 100-50 MICROgram(s) Diskus 1 Dose(s) Inhalation two times a day  methylPREDNISolone sodium succinate Injectable 40 milliGRAM(s) IV Push every 12 hours  metoprolol tartrate 12.5 milliGRAM(s) Oral two times a day  montelukast 10 milliGRAM(s) Oral daily  pantoprazole    Tablet 40 milliGRAM(s) Oral before breakfast    MEDICATIONS  (PRN):      RADIOLOGY & ADDITIONAL TESTS:    Imaging Personally Reviewed:  [ ] YES  [ ] NO    REVIEW OF SYSTEMS:  CONSTITUTIONAL: No fever, weight loss, or fatigue  EYES: No eye pain, visual disturbances, or discharge  ENMT:  No difficulty hearing, tinnitus, vertigo; No sinus or throat pain  NECK: No pain or stiffness  BREASTS: No pain, masses, or nipple discharge  RESPIRATORY: No cough, wheezing, chills or hemoptysis; No shortness of breath  CARDIOVASCULAR: No chest pain, palpitations, dizziness, or leg swelling  GASTROINTESTINAL: No abdominal or epigastric pain. No nausea, vomiting, or hematemesis; No diarrhea or constipation. No melena or hematochezia.  GENITOURINARY: No dysuria, frequency, hematuria, or incontinence  NEUROLOGICAL: No headaches, memory loss, loss of strength, numbness, or tremors  SKIN: No itching, burning, rashes, or lesions   LYMPH NODES: No enlarged glands  ENDOCRINE: No heat or cold intolerance; No hair loss  MUSCULOSKELETAL: No joint pain or swelling; No muscle, back, or extremity pain  PSYCHIATRIC: No depression, anxiety, mood swings, or difficulty sleeping  HEME/LYMPH: No easy bruising, or bleeding gums  ALLERY AND IMMUNOLOGIC: No hives or eczema      Consultant(s) Notes Reviewed:  [ x ] YES  [ ] NO    PHYSICAL EXAM:  GENERAL: NAD, well-groomed, well-developed  HEAD:  Atraumatic, Normocephalic  EYES: EOMI, PERRLA, conjunctiva and sclera clear  ENMT: No tonsillar erythema, exudates, or enlargement; Moist mucous membranes, Good dentition, No lesions  NECK: Supple, No JVD, Normal thyroid  NERVOUS SYSTEM:  Alert & Oriented X3, Good concentration; Motor Strength 5/5 B/L upper and lower extremities; DTRs 2+ intact and symmetric  CHEST/LUNG: Clear to percussion bilaterally; No rales, rhonchi, wheezing, or rubs  HEART: Regular rate and rhythm; No murmurs, rubs, or gallops  ABDOMEN: Soft, Nontender, Nondistended; Bowel sounds present  EXTREMITIES:  2+ Peripheral Pulses, No clubbing, cyanosis, or edema  LYMPH: No lymphadenopathy noted  SKIN: No rashes or lesions    Care Discussed with Consultants/Other Providers [ x] YES  [ ] NO

## 2024-09-30 LAB
ALBUMIN SERPL ELPH-MCNC: 2.8 G/DL — LOW (ref 3.5–5)
ALP SERPL-CCNC: 104 U/L — SIGNIFICANT CHANGE UP (ref 40–120)
ALT FLD-CCNC: 37 U/L DA — SIGNIFICANT CHANGE UP (ref 10–60)
ANION GAP SERPL CALC-SCNC: 1 MMOL/L — LOW (ref 5–17)
AST SERPL-CCNC: 17 U/L — SIGNIFICANT CHANGE UP (ref 10–40)
BILIRUB SERPL-MCNC: 0.8 MG/DL — SIGNIFICANT CHANGE UP (ref 0.2–1.2)
BUN SERPL-MCNC: 23 MG/DL — HIGH (ref 7–18)
CALCIUM SERPL-MCNC: 9.1 MG/DL — SIGNIFICANT CHANGE UP (ref 8.4–10.5)
CHLORIDE SERPL-SCNC: 97 MMOL/L — SIGNIFICANT CHANGE UP (ref 96–108)
CO2 SERPL-SCNC: 37 MMOL/L — HIGH (ref 22–31)
CREAT SERPL-MCNC: 0.71 MG/DL — SIGNIFICANT CHANGE UP (ref 0.5–1.3)
EGFR: 80 ML/MIN/1.73M2 — SIGNIFICANT CHANGE UP
GLUCOSE SERPL-MCNC: 183 MG/DL — HIGH (ref 70–99)
HCT VFR BLD CALC: 37 % — SIGNIFICANT CHANGE UP (ref 34.5–45)
HGB BLD-MCNC: 11.8 G/DL — SIGNIFICANT CHANGE UP (ref 11.5–15.5)
MAGNESIUM SERPL-MCNC: 2.3 MG/DL — SIGNIFICANT CHANGE UP (ref 1.6–2.6)
MCHC RBC-ENTMCNC: 29.9 PG — SIGNIFICANT CHANGE UP (ref 27–34)
MCHC RBC-ENTMCNC: 31.9 GM/DL — LOW (ref 32–36)
MCV RBC AUTO: 93.9 FL — SIGNIFICANT CHANGE UP (ref 80–100)
NRBC # BLD: 0 /100 WBCS — SIGNIFICANT CHANGE UP (ref 0–0)
PHOSPHATE SERPL-MCNC: 2.6 MG/DL — SIGNIFICANT CHANGE UP (ref 2.5–4.5)
PLATELET # BLD AUTO: 224 K/UL — SIGNIFICANT CHANGE UP (ref 150–400)
POTASSIUM SERPL-MCNC: 4.9 MMOL/L — SIGNIFICANT CHANGE UP (ref 3.5–5.3)
POTASSIUM SERPL-SCNC: 4.9 MMOL/L — SIGNIFICANT CHANGE UP (ref 3.5–5.3)
PROT SERPL-MCNC: 6.9 G/DL — SIGNIFICANT CHANGE UP (ref 6–8.3)
RBC # BLD: 3.94 M/UL — SIGNIFICANT CHANGE UP (ref 3.8–5.2)
RBC # FLD: 13.9 % — SIGNIFICANT CHANGE UP (ref 10.3–14.5)
SODIUM SERPL-SCNC: 135 MMOL/L — SIGNIFICANT CHANGE UP (ref 135–145)
WBC # BLD: 7.97 K/UL — SIGNIFICANT CHANGE UP (ref 3.8–10.5)
WBC # FLD AUTO: 7.97 K/UL — SIGNIFICANT CHANGE UP (ref 3.8–10.5)

## 2024-09-30 RX ORDER — PREDNISONE 5 MG/1
40 TABLET ORAL DAILY
Refills: 0 | Status: DISCONTINUED | OUTPATIENT
Start: 2024-10-01 | End: 2024-10-01

## 2024-09-30 RX ADMIN — Medication 1 DOSE(S): at 21:41

## 2024-09-30 RX ADMIN — Medication 12.5 MILLIGRAM(S): at 06:16

## 2024-09-30 RX ADMIN — IPRATROPIUM BROMIDE AND ALBUTEROL SULFATE 3 MILLILITER(S): .5; 3 SOLUTION RESPIRATORY (INHALATION) at 20:25

## 2024-09-30 RX ADMIN — Medication 12.5 MILLIGRAM(S): at 18:40

## 2024-09-30 RX ADMIN — IPRATROPIUM BROMIDE AND ALBUTEROL SULFATE 3 MILLILITER(S): .5; 3 SOLUTION RESPIRATORY (INHALATION) at 03:53

## 2024-09-30 RX ADMIN — ANASTROZOLE 1 MILLIGRAM(S): 1 TABLET ORAL at 11:18

## 2024-09-30 RX ADMIN — Medication 100 MILLIGRAM(S): at 06:45

## 2024-09-30 RX ADMIN — ATORVASTATIN CALCIUM 10 MILLIGRAM(S): 10 TABLET, FILM COATED ORAL at 21:41

## 2024-09-30 RX ADMIN — Medication 2 DROP(S): at 18:42

## 2024-09-30 RX ADMIN — IPRATROPIUM BROMIDE AND ALBUTEROL SULFATE 3 MILLILITER(S): .5; 3 SOLUTION RESPIRATORY (INHALATION) at 14:45

## 2024-09-30 RX ADMIN — IPRATROPIUM BROMIDE AND ALBUTEROL SULFATE 3 MILLILITER(S): .5; 3 SOLUTION RESPIRATORY (INHALATION) at 09:14

## 2024-09-30 RX ADMIN — Medication 2 DROP(S): at 11:19

## 2024-09-30 RX ADMIN — APIXABAN 5 MILLIGRAM(S): 5 TABLET, FILM COATED ORAL at 06:15

## 2024-09-30 RX ADMIN — PANTOPRAZOLE SODIUM 40 MILLIGRAM(S): 40 TABLET, DELAYED RELEASE ORAL at 06:16

## 2024-09-30 RX ADMIN — METHYLPREDNISOLONE ACETATE 40 MILLIGRAM(S): 80 INJECTION, SUSPENSION INTRA-ARTICULAR; INTRALESIONAL; INTRAMUSCULAR; SOFT TISSUE at 06:15

## 2024-09-30 RX ADMIN — Medication 1 DOSE(S): at 10:11

## 2024-09-30 RX ADMIN — Medication 2 DROP(S): at 06:14

## 2024-09-30 RX ADMIN — APIXABAN 5 MILLIGRAM(S): 5 TABLET, FILM COATED ORAL at 18:40

## 2024-09-30 RX ADMIN — MONTELUKAST SODIUM 10 MILLIGRAM(S): 10 TABLET, FILM COATED ORAL at 11:19

## 2024-09-30 NOTE — PROGRESS NOTE ADULT - PROBLEM SELECTOR PLAN 5
TTE pulmonary artery systolic pressure is 69 mmHg  consistent with severe pulmonary hypertension  pulm Dr. Son following  c/w bronchodilators

## 2024-09-30 NOTE — PROGRESS NOTE ADULT - ASSESSMENT
89 yo F from home, AAOx0 , HHA 7 hours a day  5 days a week, ambulates with walker, with morbid obesity, COPD (home O2 2L, noncompliant with cpap, multiple hospitalizations at [facility] for COPD exacerbation under MRN [id number]), HFpEF (EF 55-60%), severe RV HTN,Chronic Afib and HLD  presenting with complaint of lethargy,acute respiratory failure with CO2 retention and RUL pneumonia.  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  2.Afib-NOAC,lopressor.  3.RUL pneumonia-abx.  4.COPD and CO2 retention-off Bipap,Pulm f/u,steroids.  5.Pt not in CHF,no need for diuretics.  6.PPI.

## 2024-09-30 NOTE — PROGRESS NOTE ADULT - PROBLEM SELECTOR PLAN 3
p/w SOB and found to have hypercapnic resp failure   CXR: Rt UL infiltrate concerning for PNA. Prominent cardiac silhouette w/ pulmonary venous engorgement but   w/o malcom pulmonary edema. Mild subsegmental atelectasis versus scarring in the Lt LL  RVP neg, s/p rocephin and azithromycin in ED  ID Dr. Denton following  strep neg, legionella neg, mycoplasma neg  BCx neg NGTD @ 72 hrs  d/c azithro as legionella neg  last dose rocephin 1g qd 9/30

## 2024-09-30 NOTE — PROGRESS NOTE ADULT - PROBLEM SELECTOR PLAN 6
hx of chronic Afib  BNP 1147, trop neg  TTE: : severe pHTN, EF 57%, RV reduced systolic function  CHADSVASC score of 5 with stroke risk of 7.2%  Dr. Siu following   not on AC  lopressor for rate control  tele monitoring - Goal rate <110

## 2024-09-30 NOTE — PROGRESS NOTE ADULT - SUBJECTIVE AND OBJECTIVE BOX
Patient was seen and examined  Patient is a 89y old  Female who presents with a chief complaint of COPD vs CHF exacerbation (29 Sep 2024 15:10)      INTERVAL HPI/OVERNIGHT EVENTS:  T(C): 37 (09-30-24 @ 05:26), Max: 37 (09-30-24 @ 05:26)  HR: 65 (09-30-24 @ 05:26) (65 - 84)  BP: 138/92 (09-30-24 @ 05:26) (136/95 - 153/89)  RR: 18 (09-30-24 @ 05:26) (17 - 18)  SpO2: 100% (09-30-24 @ 05:26) (95% - 100%)  Wt(kg): --  I&O's Summary    28 Sep 2024 07:01  -  29 Sep 2024 07:00  --------------------------------------------------------  IN: 450 mL / OUT: 650 mL / NET: -200 mL    29 Sep 2024 07:01  -  30 Sep 2024 06:42  --------------------------------------------------------  IN: 0 mL / OUT: 800 mL / NET: -800 mL        LABS:                        11.8   7.97  )-----------( 224      ( 30 Sep 2024 05:24 )             37.0     09-30    x   |  x   |  x   ----------------------------<  x   x    |  x   |  0.71    Ca    8.9      29 Sep 2024 11:00  Phos  2.6     09-30  Mg     2.3     09-30    TPro  6.9  /  Alb  x   /  TBili  0.8  /  DBili  x   /  AST  17  /  ALT  37  /  AlkPhos  104  09-30      Urinalysis Basic - ( 29 Sep 2024 11:00 )    Color: x / Appearance: x / SG: x / pH: x  Gluc: 195 mg/dL / Ketone: x  / Bili: x / Urobili: x   Blood: x / Protein: x / Nitrite: x   Leuk Esterase: x / RBC: x / WBC x   Sq Epi: x / Non Sq Epi: x / Bacteria: x      CAPILLARY BLOOD GLUCOSE              Urinalysis Basic - ( 29 Sep 2024 11:00 )    Color: x / Appearance: x / SG: x / pH: x  Gluc: 195 mg/dL / Ketone: x  / Bili: x / Urobili: x   Blood: x / Protein: x / Nitrite: x   Leuk Esterase: x / RBC: x / WBC x   Sq Epi: x / Non Sq Epi: x / Bacteria: x        MEDICATIONS  (STANDING):  albuterol    90 MICROgram(s) HFA Inhaler 2 Puff(s) Inhalation every 6 hours  albuterol/ipratropium for Nebulization 3 milliLiter(s) Nebulizer every 6 hours  anastrozole 1 milliGRAM(s) Oral daily  apixaban 5 milliGRAM(s) Oral two times a day  artificial  tears Solution 2 Drop(s) Both EYES four times a day  atorvastatin 10 milliGRAM(s) Oral at bedtime  cefTRIAXone   IVPB 1000 milliGRAM(s) IV Intermittent every 24 hours  fluticasone propionate/ salmeterol 100-50 MICROgram(s) Diskus 1 Dose(s) Inhalation two times a day  methylPREDNISolone sodium succinate Injectable 40 milliGRAM(s) IV Push every 12 hours  metoprolol tartrate 12.5 milliGRAM(s) Oral two times a day  montelukast 10 milliGRAM(s) Oral daily  pantoprazole    Tablet 40 milliGRAM(s) Oral before breakfast    MEDICATIONS  (PRN):      RADIOLOGY & ADDITIONAL TESTS:    Imaging Personally Reviewed:  [ ] YES  [ ] NO    REVIEW OF SYSTEMS:  CONSTITUTIONAL: No fever, weight loss, or fatigue  EYES: No eye pain, visual disturbances, or discharge  ENMT:  No difficulty hearing, tinnitus, vertigo; No sinus or throat pain  NECK: No pain or stiffness  BREASTS: No pain, masses, or nipple discharge  RESPIRATORY: No cough, wheezing, chills or hemoptysis; No shortness of breath  CARDIOVASCULAR: No chest pain, palpitations, dizziness, or leg swelling  GASTROINTESTINAL: No abdominal or epigastric pain. No nausea, vomiting, or hematemesis; No diarrhea or constipation. No melena or hematochezia.  GENITOURINARY: No dysuria, frequency, hematuria, or incontinence  NEUROLOGICAL: No headaches, memory loss, loss of strength, numbness, or tremors  SKIN: No itching, burning, rashes, or lesions   LYMPH NODES: No enlarged glands  ENDOCRINE: No heat or cold intolerance; No hair loss  MUSCULOSKELETAL: No joint pain or swelling; No muscle, back, or extremity pain  PSYCHIATRIC: No depression, anxiety, mood swings, or difficulty sleeping  HEME/LYMPH: No easy bruising, or bleeding gums  ALLERY AND IMMUNOLOGIC: No hives or eczema      Consultant(s) Notes Reviewed:  [ x ] YES  [ ] NO    PHYSICAL EXAM:  GENERAL: NAD, well-groomed, well-developed  HEAD:  Atraumatic, Normocephalic  EYES: EOMI, PERRLA, conjunctiva and sclera clear  ENMT: No tonsillar erythema, exudates, or enlargement; Moist mucous membranes, Good dentition, No lesions  NECK: Supple, No JVD, Normal thyroid  NERVOUS SYSTEM:  Alert & Oriented X3, Good concentration; Motor Strength 5/5 B/L upper and lower extremities; DTRs 2+ intact and symmetric  CHEST/LUNG: Clear to percussion bilaterally; No rales, rhonchi, wheezing, or rubs  HEART: Regular rate and rhythm; No murmurs, rubs, or gallops  ABDOMEN: Soft, Nontender, Nondistended; Bowel sounds present  EXTREMITIES:  2+ Peripheral Pulses, No clubbing, cyanosis, or edema  LYMPH: No lymphadenopathy noted  SKIN: No rashes or lesions    Care Discussed with Consultants/Other Providers [ x] YES  [ ] NO Patient was seen and examined  Patient is a 89y old  Female who presents with a chief complaint of COPD vs CHF exacerbation (29 Sep 2024 15:10)      INTERVAL HPI/OVERNIGHT EVENTS:  T(C): 37 (09-30-24 @ 05:26), Max: 37 (09-30-24 @ 05:26)  HR: 65 (09-30-24 @ 05:26) (65 - 84)  BP: 138/92 (09-30-24 @ 05:26) (136/95 - 153/89)  RR: 18 (09-30-24 @ 05:26) (17 - 18)  SpO2: 100% (09-30-24 @ 05:26) (95% - 100%)  Wt(kg): --  I&O's Summary    28 Sep 2024 07:01  -  29 Sep 2024 07:00  --------------------------------------------------------  IN: 450 mL / OUT: 650 mL / NET: -200 mL    29 Sep 2024 07:01  -  30 Sep 2024 06:42  --------------------------------------------------------  IN: 0 mL / OUT: 800 mL / NET: -800 mL        LABS:                        11.8   7.97  )-----------( 224      ( 30 Sep 2024 05:24 )             37.0     09-30    x   |  x   |  x   ----------------------------<  x   x    |  x   |  0.71    Ca    8.9      29 Sep 2024 11:00  Phos  2.6     09-30  Mg     2.3     09-30    TPro  6.9  /  Alb  x   /  TBili  0.8  /  DBili  x   /  AST  17  /  ALT  37  /  AlkPhos  104  09-30      Urinalysis Basic - ( 29 Sep 2024 11:00 )    Color: x / Appearance: x / SG: x / pH: x  Gluc: 195 mg/dL / Ketone: x  / Bili: x / Urobili: x   Blood: x / Protein: x / Nitrite: x   Leuk Esterase: x / RBC: x / WBC x   Sq Epi: x / Non Sq Epi: x / Bacteria: x      CAPILLARY BLOOD GLUCOSE              Urinalysis Basic - ( 29 Sep 2024 11:00 )    Color: x / Appearance: x / SG: x / pH: x  Gluc: 195 mg/dL / Ketone: x  / Bili: x / Urobili: x   Blood: x / Protein: x / Nitrite: x   Leuk Esterase: x / RBC: x / WBC x   Sq Epi: x / Non Sq Epi: x / Bacteria: x        MEDICATIONS  (STANDING):  albuterol    90 MICROgram(s) HFA Inhaler 2 Puff(s) Inhalation every 6 hours  albuterol/ipratropium for Nebulization 3 milliLiter(s) Nebulizer every 6 hours  anastrozole 1 milliGRAM(s) Oral daily  apixaban 5 milliGRAM(s) Oral two times a day  artificial  tears Solution 2 Drop(s) Both EYES four times a day  atorvastatin 10 milliGRAM(s) Oral at bedtime  cefTRIAXone   IVPB 1000 milliGRAM(s) IV Intermittent every 24 hours  fluticasone propionate/ salmeterol 100-50 MICROgram(s) Diskus 1 Dose(s) Inhalation two times a day  methylPREDNISolone sodium succinate Injectable 40 milliGRAM(s) IV Push every 12 hours  metoprolol tartrate 12.5 milliGRAM(s) Oral two times a day  montelukast 10 milliGRAM(s) Oral daily  pantoprazole    Tablet 40 milliGRAM(s) Oral before breakfast    MEDICATIONS  (PRN):      RADIOLOGY & ADDITIONAL TESTS:    Imaging Personally Reviewed:  [ ] YES  [ ] NO    REVIEW OF SYSTEMS:  nad      Consultant(s) Notes Reviewed:  [ x ] YES  [ ] NO    PHYSICAL EXAM:  GENERAL: NAD, well-groomed, well-developed  HEAD:  Atraumatic, Normocephalic  EYES: EOMI, PERRLA, conjunctiva and sclera clear  ENMT: No tonsillar erythema, exudates, or enlargement; Moist mucous membranes, Good dentition, No lesions  NECK: Supple, No JVD, Normal thyroid  NERVOUS SYSTEM:  awake   CHEST/LUNG: bl rhonchi   HEART: Regular rate and rhythm; No murmurs, rubs, or gallops  ABDOMEN: Soft, Nontender, Nondistended; Bowel sounds present  EXTREMITIES:  2+ Peripheral Pulses, No clubbing, cyanosis, or edema  LYMPH: No lymphadenopathy noted  SKIN: No rashes or lesions    Care Discussed with Consultants/Other Providers [ x] YES  [ ] NO

## 2024-09-30 NOTE — PROGRESS NOTE ADULT - PROBLEM SELECTOR PLAN 1
p/w SOB, lethargy requiring BIPAP   Pro-BNP neg  ABG indicative of chronic hypercapnia  RVP neg, s/p rocephin and azithromycin in ED  CXR: Rt UL infiltrate concerning for PNA. Prominent cardiac silhouette w/ pulmonary venous engorgement but   w/o malcom pulmonary edema. Mild subsegmental atelectasis versus scarring in the Lt LL  likely 2/2 to COPD exacerbation provoked by PNA  O2 support as needed, maintain O2 sat 88-92%, avoid over oxygenation  c/w home CPAP PRN  duoneb q6h, albuterol q6h, incentive sandra  last dose rocephin 1g qd 9/30  steroid taper - (start 10/1) prednisone 40qd x3 days then 30 qd x3 days then 20 qd x3 days then 10 qd x3 days then     Pulm Dr. Son following

## 2024-09-30 NOTE — PROGRESS NOTE ADULT - ASSESSMENT
89 yo F from home, AAOx0 , HHA 7 hours a day  5 days a week, ambulates with walker, with morbid obesity, COPD (home O2 2L, noncompliant with cpap, multiple hospitalizations at [facility] for COPD exacerbation under MRN [id number]), HFpEF (EF 55-60%), severe RV HTN,Chronic Afib and HLD  presenting with complaint of lethargy,acute respiratory failure with CO2 retention and RUL pneumonia.  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  2.Afib-NOAC,lopressor.  3.RUL pneumonia-abx. dc abx in am 10/1  4.COPD and CO2 retention-off Bipap,Pulm f/u,steroids.  5.Pt not in CHF,no need for diuretics.  6.PPI.  7.TAPER STEROIDS TO PO

## 2024-09-30 NOTE — PROGRESS NOTE ADULT - SUBJECTIVE AND OBJECTIVE BOX
Date of Service 10-01-24 @ 11:34    CHIEF COMPLAINT:Patient is a 89y old  Female who presents with a chief complaint of COPD.Pt appears comfortable.    	  REVIEW OF SYSTEMS:  CONSTITUTIONAL: No fever, weight loss, or fatigue  EYES: No eye pain, visual disturbances, or discharge  ENT:  No difficulty hearing, tinnitus, vertigo; No sinus or throat pain  NECK: No pain or stiffness  RESPIRATORY: No cough, wheezing, chills or hemoptysis; No Shortness of Breath  CARDIOVASCULAR: No chest pain, palpitations, passing out, dizziness, or leg swelling  GASTROINTESTINAL: No abdominal or epigastric pain. No nausea, vomiting, or hematemesis; No diarrhea or constipation. No melena or hematochezia.  GENITOURINARY: No dysuria, frequency, hematuria, or incontinence  NEUROLOGICAL: No headaches, memory loss, loss of strength, numbness, or tremors  SKIN: No itching, burning, rashes, or lesions   LYMPH Nodes: No enlarged glands  ENDOCRINE: No heat or cold intolerance; No hair loss  MUSCULOSKELETAL: No joint pain or swelling; No muscle, back, or extremity pain  PSYCHIATRIC: No depression, anxiety, mood swings, or difficulty sleeping  HEME/LYMPH: No easy bruising, or bleeding gums  ALLERGY AND IMMUNOLOGIC: No hives or eczema	        PHYSICAL EXAM:  T(C): 36.8 (10-01-24 @ 05:13), Max: 37.1 (09-30-24 @ 13:19)  HR: 82 (10-01-24 @ 11:30) (81 - 93)  BP: 143/100 (10-01-24 @ 06:56) (136/89 - 169/93)  RR: 19 (10-01-24 @ 05:13) (16 - 19)  SpO2: 95% (10-01-24 @ 11:30) (94% - 100%)  Wt(kg): --  I&O's Summary    30 Sep 2024 07:01  -  01 Oct 2024 07:00  --------------------------------------------------------  IN: 0 mL / OUT: 3400 mL / NET: -3400 mL        Appearance: Normal	  HEENT:   Normal oral mucosa, PERRL, EOMI	  Lymphatic: No lymphadenopathy  Cardiovascular: Normal S1 S2, No JVD, No murmurs, No edema  Respiratory: Lungs clear to auscultation	  Psychiatry: A & O x 3, Mood & affect appropriate  Gastrointestinal:  Soft, Non-tender, + BS	  Skin: No rashes, No ecchymoses, No cyanosis	  Neurologic: Non-focal  Extremities: Normal range of motion, No clubbing, cyanosis or edema  Vascular: Peripheral pulses palpable 2+ bilaterally    MEDICATIONS  (STANDING):  albuterol    90 MICROgram(s) HFA Inhaler 2 Puff(s) Inhalation every 6 hours  albuterol/ipratropium for Nebulization 3 milliLiter(s) Nebulizer every 6 hours  anastrozole 1 milliGRAM(s) Oral daily  apixaban 5 milliGRAM(s) Oral two times a day  artificial  tears Solution 2 Drop(s) Both EYES four times a day  atorvastatin 10 milliGRAM(s) Oral at bedtime  fluticasone propionate/ salmeterol 100-50 MICROgram(s) Diskus 1 Dose(s) Inhalation two times a day  metoprolol tartrate 12.5 milliGRAM(s) Oral two times a day  montelukast 10 milliGRAM(s) Oral daily  pantoprazole    Tablet 40 milliGRAM(s) Oral before breakfast  predniSONE   Tablet 40 milliGRAM(s) Oral daily      TELEMETRY: 	    ECG:  	  RADIOLOGY:  OTHER: 	  	  LABS:	 	    CARDIAC MARKERS:                                11.8   7.97  )-----------( 224      ( 30 Sep 2024 05:24 )             37.0     09-30    135  |  97  |  23[H]  ----------------------------<  183[H]  4.9   |  37[H]  |  0.71    Ca    9.1      30 Sep 2024 05:24  Phos  2.6     09-30  Mg     2.3     09-30    TPro  6.9  /  Alb  2.8[L]  /  TBili  0.8  /  DBili  x   /  AST  17  /  ALT  37  /  AlkPhos  104  09-30    proBNP:   Lipid Profile: Cholesterol 136  LDL --  HDL 66  TG 46  Ldl calc 61  Ratio --    HgA1c:   TSH:

## 2024-09-30 NOTE — PROGRESS NOTE ADULT - SUBJECTIVE AND OBJECTIVE BOX
Progress Note discussed with attending    MS TEAMS AUTHOR OF THIS NOTE TILL 5:00 PM  PLEASE CONTACT ON CALL TEAM:  - On Call Team (Please refer to Cassy) FROM 5:00 PM - 8:30PM  - Nightfloat Team FROM 8:30 -7:30 AM    INTERVAL HPI/OVERNIGHT EVENTS:   Pt examined in bed NAD no complaints or overnight events.    MEDICATIONS  (STANDING):  albuterol    90 MICROgram(s) HFA Inhaler 2 Puff(s) Inhalation every 6 hours  albuterol/ipratropium for Nebulization 3 milliLiter(s) Nebulizer every 6 hours  anastrozole 1 milliGRAM(s) Oral daily  apixaban 5 milliGRAM(s) Oral two times a day  artificial  tears Solution 2 Drop(s) Both EYES four times a day  atorvastatin 10 milliGRAM(s) Oral at bedtime  fluticasone propionate/ salmeterol 100-50 MICROgram(s) Diskus 1 Dose(s) Inhalation two times a day  metoprolol tartrate 12.5 milliGRAM(s) Oral two times a day  montelukast 10 milliGRAM(s) Oral daily  pantoprazole    Tablet 40 milliGRAM(s) Oral before breakfast    REVIEW OF SYSTEMS:  CONSTITUTIONAL: No fever, weight loss, or fatigue  RESPIRATORY: No shortness of breath  CARDIOVASCULAR: No chest pain  GASTROINTESTINAL: No abdominal pain.  GENITOURINARY: No dysuria  NEUROLOGICAL: No headaches  SKIN: No itching, burning, rashes    Vital Signs Last 24 Hrs  T(C): 37 (30 Sep 2024 05:26), Max: 37 (30 Sep 2024 05:26)  T(F): 98.6 (30 Sep 2024 05:26), Max: 98.6 (30 Sep 2024 05:26)  HR: 88 (30 Sep 2024 08:48) (65 - 88)  BP: 138/92 (30 Sep 2024 05:26) (136/95 - 153/89)  BP(mean): 106 (29 Sep 2024 20:58) (106 - 109)  RR: 18 (30 Sep 2024 05:26) (17 - 18)  SpO2: 99% (30 Sep 2024 08:48) (95% - 100%)    Parameters below as of 30 Sep 2024 05:26  Patient On (Oxygen Delivery Method): BiPAP/CPAP    PHYSICAL EXAMINATION:  GENERAL: NAD, well-groomed, well-developed  HEAD:  Atraumatic, Normocephalic  EYES: EOMI, PERRLA, conjunctiva and sclera clear  ENMT: No tonsillar erythema, exudates, or enlargement; Moist mucous membranes, Good dentition, No lesions  NECK: Supple, No JVD, Normal thyroid  NERVOUS SYSTEM:  awake   CHEST/LUNG: bl rhonchi   HEART: Regular rate and rhythm; No murmurs, rubs, or gallops  ABDOMEN: Soft, Nontender, Nondistended; Bowel sounds present  EXTREMITIES:  2+ Peripheral Pulses, No clubbing, cyanosis, or edema  LYMPH: No lymphadenopathy noted  SKIN: No rashes or lesions                          11.8   7.97  )-----------( 224      ( 30 Sep 2024 05:24 )             37.0     09-30    135  |  97  |  23[H]  ----------------------------<  183[H]  4.9   |  37[H]  |  0.71    Ca    9.1      30 Sep 2024 05:24  Phos  2.6     09-30  Mg     2.3     09-30    TPro  6.9  /  Alb  2.8[L]  /  TBili  0.8  /  DBili  x   /  AST  17  /  ALT  37  /  AlkPhos  104  09-30    LIVER FUNCTIONS - ( 30 Sep 2024 05:24 )  Alb: 2.8 g/dL / Pro: 6.9 g/dL / ALK PHOS: 104 U/L / ALT: 37 U/L DA / AST: 17 U/L / GGT: x

## 2024-09-30 NOTE — PROGRESS NOTE ADULT - ASSESSMENT
89 F, w/ morbid obesity, COPD (home O2 2L, noncompliant with CPAP, multiple hospitalizations at [facility] for COPD exacerbation under MRN [id number]), HFpEF (EF 55-60%), severe RV HTN, HLD p/w SOB. Admitted for hypercapnic respiratory failure likely 2/2 COPD exacerbation. On 2L NC. s/p azithro. UCx E. Coli. On CTX for PNA and UTI. BCx NGTD @ 72 hrs.

## 2024-09-30 NOTE — PROGRESS NOTE ADULT - SUBJECTIVE AND OBJECTIVE BOX
Patient is seen and examined at the bed side, is afebrile. She is doing better.       REVIEW OF SYSTEMS: All other review systems are negative      ALLERGY: Allergy Status Unknown      Vital Signs Last 24 Hrs  T(C): 37 (30 Sep 2024 05:26), Max: 37 (30 Sep 2024 05:26)  T(F): 98.6 (30 Sep 2024 05:26), Max: 98.6 (30 Sep 2024 05:26)  HR: 88 (30 Sep 2024 08:48) (65 - 88)  BP: 138/92 (30 Sep 2024 05:26) (136/95 - 153/89)  BP(mean): 106 (29 Sep 2024 20:58) (106 - 109)  RR: 18 (30 Sep 2024 05:26) (17 - 18)  SpO2: 99% (30 Sep 2024 08:48) (95% - 100%)    Parameters below as of 30 Sep 2024 05:26  Patient On (Oxygen Delivery Method): BiPAP/CPAP        PHYSICAL EXAM:  GENERAL: Not in distress   CHEST/LUNG:  Not using accessory muscles   HEART: s1 and s2 present  ABDOMEN:  Nontender and  Nondistended  EXTREMITIES: No pedal  edema  CNS: Awake and Alert      LABS:                        11.8   7.97  )-----------( 224      ( 30 Sep 2024 05:24 )             37.0                           12.5   7.81  )-----------( 215      ( 29 Sep 2024 07:30 )             39.8             09-30    135  |  97  |  23[H]  ----------------------------<  183[H]  4.9   |  37[H]  |  0.71    Ca    9.1      30 Sep 2024 05:24  Phos  2.6     09-30  Mg     2.3     09-30    TPro  6.9  /  Alb  2.8[L]  /  TBili  0.8  /  DBili  x   /  AST  17  /  ALT  37  /  AlkPhos  104  09-30    09-29    134[L]  |  98  |  22[H]  ----------------------------<  195[H]  5.9[H]   |  31  |  0.88    Ca    8.9      29 Sep 2024 11:00  Phos  2.7     09-29  Mg     2.6     09-29    TPro  7.9  /  Alb  3.0[L]  /  TBili  0.9  /  DBili  x   /  AST  41[H]  /  ALT  39  /  AlkPhos  109  09-29    PT/INR - ( 24 Sep 2024 17:00 )   PT: 21.6 sec;   INR: 1.86 ratio       PTT - ( 24 Sep 2024 17:00 )  PTT:41.7 sec      POCT Blood Glucose.: 165 mg/dL (24 Sep 2024 16:46)  ABG - ( 25 Sep 2024 04:06 )  pH, Arterial: 7.29  pH, Blood: x     /  pCO2: 74    /  pO2: 73    / HCO3: 36    / Base Excess: 6.3   /  SaO2: 93            MEDICATIONS  (STANDING):    albuterol    90 MICROgram(s) HFA Inhaler 2 Puff(s) Inhalation every 6 hours  albuterol/ipratropium for Nebulization 3 milliLiter(s) Nebulizer every 6 hours  anastrozole 1 milliGRAM(s) Oral daily  apixaban 5 milliGRAM(s) Oral two times a day  artificial  tears Solution 2 Drop(s) Both EYES four times a day  atorvastatin 10 milliGRAM(s) Oral at bedtime  fluticasone propionate/ salmeterol 100-50 MICROgram(s) Diskus 1 Dose(s) Inhalation two times a day  metoprolol tartrate 12.5 milliGRAM(s) Oral two times a day  montelukast 10 milliGRAM(s) Oral daily  pantoprazole    Tablet 40 milliGRAM(s) Oral before breakfast        RADIOLOGY & ADDITIONAL TESTS:    9/24/24: Xray Chest 1 View-PORTABLE IMMEDIATE (Xray Chest 1 View-PORTABLE IMMEDIATE .) (09.24.24 @ 17:22) >  1. Right upper lobe infiltrate concerning for pneumonia.  2. Prominent cardiac silhouette with pulmonary venous engorgement but without malcom pulmonary edema.  3. Mild subsegmental atelectasis versus scarring in the left lower lobe.      MICROBIOLOGY DATA:    Culture - Urine (09.24.24 @ 21:30)   - Amoxicillin/Clavulanic Acid: S <=8/4  - Ampicillin: S <=8 These ampicillin results predict results for amoxicillin  - Ampicillin/Sulbactam: S <=4/2  - Aztreonam: S <=4  - Cefazolin: S <=2 For uncomplicated UTI with K. pneumoniae, E. coli, or P. mirablis: NIDIA <=16 is sensitive and NIDIA >=32 is resistant. This also predicts results for oral agents cefaclor, cefdinir, cefpodoxime, cefprozil, cefuroxime axetil, cephalexin and locarbef for uncomplicated UTI. Note that some isolates may be susceptible to these agents while testing resistant to cefazolin.  - Cefepime: S <=2  - Cefoxitin: S <=8  - Ceftriaxone: S <=1  - Cefuroxime: S <=4  - Ciprofloxacin: R >2  - Ertapenem: S <=0.5  - Gentamicin: S <=2  - Imipenem: S <=1  - Levofloxacin: R >4  - Meropenem: S <=1  - Nitrofurantoin: S <=32 Should not be used to treat pyelonephritis  - Piperacillin/Tazobactam: SDD 16  - Tobramycin: S <=2  - Trimethoprim/Sulfamethoxazole: R >2/38  Specimen Source: Clean Catch  Culture Results:   10,000 - 49,000 CFU/mL Escherichia coli   <10,000 CFU/ml Normal Urogenital cas present  Organism Identification: Escherichia coli  Organism: Escherichia coli  Method Type: NIDAI    Legionella pneumophila Antigen, Urine (09.25.24 @ 07:21)   Legionella Antigen, Urine: Negative    Culture - Urine (09.24.24 @ 21:30)   Specimen Source: Clean Catch  Culture Results:   10,000 - 49,000 CFU/mL Escherichia coli   <10,000 CFU/ml Normal Urogenital cas present    Culture - Blood (09.24.24 @ 17:00)   Specimen Source: .Blood Blood-Peripheral  Culture Results: No growth at 24 hours    Culture - Blood (09.24.24 @ 16:50)   Specimen Source: .Blood Blood-Peripheral  Culture Results: No growth at 24 hours    Urine Microscopic-Add On (NC) (09.24.24 @ 21:30)   Red Blood Cell - Urine: 8 /HPF  White Blood Cell - Urine: 25 /HPF  Bacteria: Moderate /HPF  Squamous Epithelial Cells: Present

## 2024-09-30 NOTE — PROGRESS NOTE ADULT - ASSESSMENT
Patient is a 89y old  Female who is from home, AAOx0 , HHA 7 hours a day  5 days a week, ambulates with walker, with morbid obesity, COPD (home O2 2L, noncompliant with cpap, multiple hospitalizations at [facility] for COPD exacerbation under MRN [id number]), HFpEF (EF 55-60%), severe RV HTN and HLD, now presents to the ER for evaluation of lethargy. Collateral info obtained from granddaughter and daughter Sylvia at bedside. Sylvia lives with the patient. Say that pt was in her usual state of health , had a drink of kenyon and was feeling well, until about 3pm on day of presentation when she started saying she was feeling unwell. They took her oxygen at home and it was 50s and then went back up to 80s They called EMS, she was put on CPAP and given 2 doses of SL nitro. In the ED pt was lethargic, encephalopathic, minimally conversant. As per family this is not her baseline. Family admits that pt has not been on her home cpap in a "very long time." On admission, she found to have no fever, but positive Urine analysis and RUL infiltrate. She has started on Ceftriaxone and Azithromycin, and the ID consult requested to assist with further evaluation and antibiotic management.    # Pneumonia - Legionella urine Ag is negative  # Acute Respiratory Failure - on BIPAP transition to NC  # UTI - Urine cx grew E.coli    would recommend:    1. Discontinue Ceftriaxone after today's dose  2. Aspiration precaution  3. Supplemental oxygenation and bronchodilator as needed  4. PT/OOB to chair    Attending Attestation:     Spent more than 35 minutes on total encounter, more than 50 % of the visit was spent counseling and/or coordinating care by the Attending physician.

## 2024-10-01 ENCOUNTER — TRANSCRIPTION ENCOUNTER (OUTPATIENT)
Age: 89
End: 2024-10-01

## 2024-10-01 VITALS
OXYGEN SATURATION: 99 % | RESPIRATION RATE: 18 BRPM | HEART RATE: 92 BPM | DIASTOLIC BLOOD PRESSURE: 91 MMHG | TEMPERATURE: 98 F | SYSTOLIC BLOOD PRESSURE: 160 MMHG

## 2024-10-01 RX ORDER — ATORVASTATIN CALCIUM 10 MG/1
1 TABLET, FILM COATED ORAL
Qty: 30 | Refills: 0
Start: 2024-10-01 | End: 2024-10-30

## 2024-10-01 RX ORDER — AMLODIPINE BESYLATE 5 MG
2.5 TABLET ORAL DAILY
Refills: 0 | Status: DISCONTINUED | OUTPATIENT
Start: 2024-10-01 | End: 2024-10-01

## 2024-10-01 RX ORDER — METOPROLOL TARTRATE 50 MG
25 TABLET ORAL
Refills: 0 | Status: DISCONTINUED | OUTPATIENT
Start: 2024-10-01 | End: 2024-10-01

## 2024-10-01 RX ORDER — PREDNISONE 5 MG/1
1 TABLET ORAL
Qty: 26 | Refills: 0
Start: 2024-10-01 | End: 2024-10-11

## 2024-10-01 RX ORDER — ALBUTEROL 90 MCG
2 AEROSOL (GRAM) INHALATION
Qty: 1 | Refills: 0
Start: 2024-10-01 | End: 2024-10-30

## 2024-10-01 RX ADMIN — Medication 2 DROP(S): at 13:02

## 2024-10-01 RX ADMIN — ANASTROZOLE 1 MILLIGRAM(S): 1 TABLET ORAL at 13:02

## 2024-10-01 RX ADMIN — Medication 2 DROP(S): at 01:03

## 2024-10-01 RX ADMIN — IPRATROPIUM BROMIDE AND ALBUTEROL SULFATE 3 MILLILITER(S): .5; 3 SOLUTION RESPIRATORY (INHALATION) at 08:11

## 2024-10-01 RX ADMIN — Medication 2 DROP(S): at 06:02

## 2024-10-01 RX ADMIN — Medication 25 MILLIGRAM(S): at 13:02

## 2024-10-01 RX ADMIN — Medication 12.5 MILLIGRAM(S): at 06:01

## 2024-10-01 RX ADMIN — IPRATROPIUM BROMIDE AND ALBUTEROL SULFATE 3 MILLILITER(S): .5; 3 SOLUTION RESPIRATORY (INHALATION) at 03:02

## 2024-10-01 RX ADMIN — PANTOPRAZOLE SODIUM 40 MILLIGRAM(S): 40 TABLET, DELAYED RELEASE ORAL at 06:01

## 2024-10-01 RX ADMIN — PREDNISONE 40 MILLIGRAM(S): 5 TABLET ORAL at 06:01

## 2024-10-01 RX ADMIN — MONTELUKAST SODIUM 10 MILLIGRAM(S): 10 TABLET, FILM COATED ORAL at 13:02

## 2024-10-01 RX ADMIN — IPRATROPIUM BROMIDE AND ALBUTEROL SULFATE 3 MILLILITER(S): .5; 3 SOLUTION RESPIRATORY (INHALATION) at 14:31

## 2024-10-01 RX ADMIN — APIXABAN 5 MILLIGRAM(S): 5 TABLET, FILM COATED ORAL at 06:01

## 2024-10-01 RX ADMIN — Medication 2.5 MILLIGRAM(S): at 13:02

## 2024-10-01 NOTE — DISCHARGE NOTE PROVIDER - NSDCMRMEDTOKEN_GEN_ALL_CORE_FT
anastrozole 1 mg oral tablet: 1 tab(s) orally once a day  carvedilol 12.5 mg oral tablet: 1 tab(s) orally once a day  cholecalciferol: 50,000 international unit(s) orally once a week  Eliquis 5 mg oral tablet: 1 tab(s) orally 2 times a day  Lasix 40 mg oral tablet: 1 tab(s) orally once a day  Singulair 10 mg oral tablet: 1 tab(s) orally once a day  Zocor 20 mg oral tablet: 1 tab(s) orally once a day (at bedtime)   albuterol 90 mcg/inh inhalation aerosol: 2 puff(s) inhaled every 6 hours as needed for  bronchospasm  anastrozole 1 mg oral tablet: 1 tab(s) orally once a day  atorvastatin 10 mg oral tablet: 1 tab(s) orally once a day (at bedtime)  carvedilol 6.25 mg oral tablet: 1 tab(s) orally 2 times a day  Eliquis 5 mg oral tablet: 1 tab(s) orally 2 times a day  famotidine 20 mg oral tablet: 1 tab(s) orally 2 times a day  Lasix 40 mg oral tablet: 1 tab(s) orally once a day  predniSONE 10 mg oral tablet: 1 tab(s) orally once a day TAKe PREDNISONE 40 MG X 2 DAYS STARTING 10/02 TILL 10/03, THEN TAKE PREDNISONE 30 MG ONE TABLET FOR 3 DAYS FROM 10/04 TILL 10/06, THEN 20 MG ONE TABLET FOR 3 DAYS FROM 10/07 TILL 10/09, THEN 10 MG ONE TABLET FOR 3 DAYS FROM 10/10 TILL 10/12.  Singulair 10 mg oral tablet: 1 tab(s) orally once a day  Trelegy Ellipta 100 mcg-62.5 mcg-25 mcg/inh inhalation powder: 1 puff(s) inhaled once a day

## 2024-10-01 NOTE — PROGRESS NOTE ADULT - PROVIDER SPECIALTY LIST ADULT
Infectious Disease
Infectious Disease
Internal Medicine
Internal Medicine
Cardiology
Infectious Disease
Cardiology
Cardiology
Pulmonology
Internal Medicine
Pulmonology
Internal Medicine

## 2024-10-01 NOTE — DISCHARGE NOTE PROVIDER - CARE PROVIDER_API CALL
Ayden Bianchi  Internal Medicine  [address] [place] [address], [address]  [place] [address]  Phone: [phone]  Fax: [phone]  Follow Up Time: 1 week    Rito Son  Pulmonary Disease  [address] [place] [address]  Phone: [phone]  Fax: [phone]  Follow Up Time: 1 week    Amin, Mohsena Fatema  Infectious Disease  [address] [place] [address]  Phone: [phone]  Fax: [phone]  Follow Up Time: 1 week    Kristine Siu  Cardiology  [address] [place] [address]  Phone: [phone]  Fax: [phone]  Follow Up Time: 1 week

## 2024-10-01 NOTE — PROGRESS NOTE ADULT - SUBJECTIVE AND OBJECTIVE BOX
Progress Note discussed with attending    MS TEAMS AUTHOR OF THIS NOTE TILL 5:00 PM  PLEASE CONTACT ON CALL TEAM:  - On Call Team (Please refer to Cassy) FROM 5:00 PM - 8:30PM  - Nightfloat Team FROM 8:30 -7:30 AM    INTERVAL HPI/OVERNIGHT EVENTS:   Pt examined at bedside no acute events or overnight events.    MEDICATIONS  (STANDING):  albuterol    90 MICROgram(s) HFA Inhaler 2 Puff(s) Inhalation every 6 hours  albuterol/ipratropium for Nebulization 3 milliLiter(s) Nebulizer every 6 hours  anastrozole 1 milliGRAM(s) Oral daily  apixaban 5 milliGRAM(s) Oral two times a day  artificial  tears Solution 2 Drop(s) Both EYES four times a day  atorvastatin 10 milliGRAM(s) Oral at bedtime  fluticasone propionate/ salmeterol 100-50 MICROgram(s) Diskus 1 Dose(s) Inhalation two times a day  metoprolol tartrate 12.5 milliGRAM(s) Oral two times a day  montelukast 10 milliGRAM(s) Oral daily  pantoprazole    Tablet 40 milliGRAM(s) Oral before breakfast  predniSONE   Tablet 40 milliGRAM(s) Oral daily    REVIEW OF SYSTEMS:  CONSTITUTIONAL: No fever, weight loss, or fatigue  RESPIRATORY: No shortness of breath  CARDIOVASCULAR: No chest pain  GASTROINTESTINAL: No abdominal pain.  GENITOURINARY: No dysuria  NEUROLOGICAL: No headaches  SKIN: No itching, burning, rashes    Vital Signs Last 24 Hrs  T(C): 36.8 (01 Oct 2024 05:13), Max: 37.1 (30 Sep 2024 13:19)  T(F): 98.2 (01 Oct 2024 05:13), Max: 98.8 (30 Sep 2024 13:19)  HR: 81 (01 Oct 2024 08:05) (81 - 93)  BP: 143/100 (01 Oct 2024 06:56) (136/89 - 169/93)  BP(mean): --  RR: 19 (01 Oct 2024 05:13) (16 - 19)  SpO2: 98% (01 Oct 2024 08:05) (94% - 100%)    Parameters below as of 01 Oct 2024 05:13  Patient On (Oxygen Delivery Method): nasal cannula  O2 Flow (L/min): 2    PHYSICAL EXAMINATION:  GENERAL: NAD, well built  HEAD:  Atraumatic, Normocephalic  EYES:  conjunctiva and sclera clear  CHEST/LUNG: Clear to auscultation. No rales, rhonchi, wheezing, or rubs  HEART: Regular rate and rhythm; No murmurs, rubs, or gallops  ABDOMEN: Soft, Nontender, Nondistended; Bowel sounds present  NERVOUS SYSTEM:  Alert & Oriented X2   EXTREMITIES:  2+ Peripheral Pulses, No clubbing, cyanosis, or edema  SKIN: warm dry                          11.8   7.97  )-----------( 224      ( 30 Sep 2024 05:24 )             37.0     09-30    135  |  97  |  23[H]  ----------------------------<  183[H]  4.9   |  37[H]  |  0.71    Ca    9.1      30 Sep 2024 05:24  Phos  2.6     09-30  Mg     2.3     09-30    TPro  6.9  /  Alb  2.8[L]  /  TBili  0.8  /  DBili  x   /  AST  17  /  ALT  37  /  AlkPhos  104  09-30    LIVER FUNCTIONS - ( 30 Sep 2024 05:24 )  Alb: 2.8 g/dL / Pro: 6.9 g/dL / ALK PHOS: 104 U/L / ALT: 37 U/L DA / AST: 17 U/L / GGT: x

## 2024-10-01 NOTE — PROGRESS NOTE ADULT - ASSESSMENT
Patient is a 89y old  Female who is from home, AAOx0 , HHA 7 hours a day  5 days a week, ambulates with walker, with morbid obesity, COPD (home O2 2L, noncompliant with cpap, multiple hospitalizations at [facility] for COPD exacerbation under MRN [id number]), HFpEF (EF 55-60%), severe RV HTN and HLD, now presents to the ER for evaluation of lethargy. Collateral info obtained from granddaughter and daughter Sylvia at bedside. Sylvia lives with the patient. Say that pt was in her usual state of health , had a drink of kenyon and was feeling well, until about 3pm on day of presentation when she started saying she was feeling unwell. They took her oxygen at home and it was 50s and then went back up to 80s They called EMS, she was put on CPAP and given 2 doses of SL nitro. In the ED pt was lethargic, encephalopathic, minimally conversant. As per family this is not her baseline. Family admits that pt has not been on her home cpap in a "very long time." On admission, she found to have no fever, but positive Urine analysis and RUL infiltrate. She has started on Ceftriaxone and Azithromycin, and the ID consult requested to assist with further evaluation and antibiotic management.    # Pneumonia - Legionella urine Ag is negative  # Acute Respiratory Failure - on BIPAP transition to NC  # UTI - Urine cx grew E.coli    would recommend:    1. Monitor off ABx as she has completed the course   2. Aspiration precaution  3. Supplemental oxygenation and bronchodilator as needed  4. PT/OOB to chair    Attending Attestation:     Spent more than 35 minutes on total encounter, more than 50 % of the visit was spent counseling and/or coordinating care by the Attending physician.

## 2024-10-01 NOTE — PROGRESS NOTE ADULT - SUBJECTIVE AND OBJECTIVE BOX
Patient was seen and examined  Patient is a 89y old  Female who presents with a chief complaint of COPD vs CHF exacerbation (30 Sep 2024 11:54)      INTERVAL HPI/OVERNIGHT EVENTS:  T(C): 36.8 (10-01-24 @ 05:13), Max: 37.1 (09-30-24 @ 13:19)  HR: 93 (10-01-24 @ 05:13) (65 - 93)  BP: 136/89 (09-30-24 @ 21:15) (136/89 - 169/93)  RR: 16 (09-30-24 @ 21:15) (16 - 18)  SpO2: 100% (09-30-24 @ 21:15) (97% - 100%)  Wt(kg): --  I&O's Summary    29 Sep 2024 07:01  -  30 Sep 2024 07:00  --------------------------------------------------------  IN: 0 mL / OUT: 800 mL / NET: -800 mL    30 Sep 2024 07:01  -  01 Oct 2024 05:19  --------------------------------------------------------  IN: 0 mL / OUT: 1400 mL / NET: -1400 mL        LABS:                        11.8   7.97  )-----------( 224      ( 30 Sep 2024 05:24 )             37.0     09-30    135  |  97  |  23[H]  ----------------------------<  183[H]  4.9   |  37[H]  |  0.71    Ca    9.1      30 Sep 2024 05:24  Phos  2.6     09-30  Mg     2.3     09-30    TPro  6.9  /  Alb  2.8[L]  /  TBili  0.8  /  DBili  x   /  AST  17  /  ALT  37  /  AlkPhos  104  09-30      Urinalysis Basic - ( 30 Sep 2024 05:24 )    Color: x / Appearance: x / SG: x / pH: x  Gluc: 183 mg/dL / Ketone: x  / Bili: x / Urobili: x   Blood: x / Protein: x / Nitrite: x   Leuk Esterase: x / RBC: x / WBC x   Sq Epi: x / Non Sq Epi: x / Bacteria: x      CAPILLARY BLOOD GLUCOSE              Urinalysis Basic - ( 30 Sep 2024 05:24 )    Color: x / Appearance: x / SG: x / pH: x  Gluc: 183 mg/dL / Ketone: x  / Bili: x / Urobili: x   Blood: x / Protein: x / Nitrite: x   Leuk Esterase: x / RBC: x / WBC x   Sq Epi: x / Non Sq Epi: x / Bacteria: x        MEDICATIONS  (STANDING):  albuterol    90 MICROgram(s) HFA Inhaler 2 Puff(s) Inhalation every 6 hours  albuterol/ipratropium for Nebulization 3 milliLiter(s) Nebulizer every 6 hours  anastrozole 1 milliGRAM(s) Oral daily  apixaban 5 milliGRAM(s) Oral two times a day  artificial  tears Solution 2 Drop(s) Both EYES four times a day  atorvastatin 10 milliGRAM(s) Oral at bedtime  fluticasone propionate/ salmeterol 100-50 MICROgram(s) Diskus 1 Dose(s) Inhalation two times a day  metoprolol tartrate 12.5 milliGRAM(s) Oral two times a day  montelukast 10 milliGRAM(s) Oral daily  pantoprazole    Tablet 40 milliGRAM(s) Oral before breakfast  predniSONE   Tablet 40 milliGRAM(s) Oral daily    MEDICATIONS  (PRN):      RADIOLOGY & ADDITIONAL TESTS:    Imaging Personally Reviewed:  [ ] YES  [ ] NO    REVIEW OF SYSTEMS:  CONSTITUTIONAL: No fever, weight loss, or fatigue  EYES: No eye pain, visual disturbances, or discharge  ENMT:  No difficulty hearing, tinnitus, vertigo; No sinus or throat pain  NECK: No pain or stiffness  BREASTS: No pain, masses, or nipple discharge  RESPIRATORY: No cough, wheezing, chills or hemoptysis; No shortness of breath  CARDIOVASCULAR: No chest pain, palpitations, dizziness, or leg swelling  GASTROINTESTINAL: No abdominal or epigastric pain. No nausea, vomiting, or hematemesis; No diarrhea or constipation. No melena or hematochezia.  GENITOURINARY: No dysuria, frequency, hematuria, or incontinence  NEUROLOGICAL: No headaches, memory loss, loss of strength, numbness, or tremors  SKIN: No itching, burning, rashes, or lesions   LYMPH NODES: No enlarged glands  ENDOCRINE: No heat or cold intolerance; No hair loss  MUSCULOSKELETAL: No joint pain or swelling; No muscle, back, or extremity pain  PSYCHIATRIC: No depression, anxiety, mood swings, or difficulty sleeping  HEME/LYMPH: No easy bruising, or bleeding gums  ALLERY AND IMMUNOLOGIC: No hives or eczema      Consultant(s) Notes Reviewed:  [ x ] YES  [ ] NO    PHYSICAL EXAM:  GENERAL: NAD, well-groomed, well-developed  HEAD:  Atraumatic, Normocephalic  EYES: EOMI, PERRLA, conjunctiva and sclera clear  ENMT: No tonsillar erythema, exudates, or enlargement; Moist mucous membranes, Good dentition, No lesions  NECK: Supple, No JVD, Normal thyroid  NERVOUS SYSTEM:  Alert & Oriented X3, Good concentration; Motor Strength 5/5 B/L upper and lower extremities; DTRs 2+ intact and symmetric  CHEST/LUNG: Clear to percussion bilaterally; No rales, rhonchi, wheezing, or rubs  HEART: Regular rate and rhythm; No murmurs, rubs, or gallops  ABDOMEN: Soft, Nontender, Nondistended; Bowel sounds present  EXTREMITIES:  2+ Peripheral Pulses, No clubbing, cyanosis, or edema  LYMPH: No lymphadenopathy noted  SKIN: No rashes or lesions    Care Discussed with Consultants/Other Providers [ x] YES  [ ] NO

## 2024-10-01 NOTE — DISCHARGE NOTE NURSING/CASE MANAGEMENT/SOCIAL WORK - PATIENT PORTAL LINK FT
You can access the FollowMyHealth Patient Portal offered by [facility] by registering at the following website: [email or web address]. By joining [employer]’s FollowMyHealth portal, you will also be able to view your health information using other applications (apps) compatible with our system.

## 2024-10-01 NOTE — PROGRESS NOTE ADULT - PROBLEM SELECTOR PROBLEM 2
Pneumonia
COPD exacerbation
COPD exacerbation
Pneumonia
COPD exacerbation

## 2024-10-01 NOTE — PROGRESS NOTE ADULT - ASSESSMENT
89 F, w/ morbid obesity, COPD (home O2 2L, noncompliant with CPAP, multiple hospitalizations at [facility] for COPD exacerbation under MRN [id number]), HFpEF (EF 55-60%), severe RV HTN, HLD p/w SOB. Admitted for hypercapnic respiratory failure likely 2/2 COPD exacerbation. On 2L NC. s/p azithro. UCx E. Coli. On CTX for PNA and UTI. BCx NGTD @ 72 hrs.   DC PLANNING

## 2024-10-01 NOTE — PROGRESS NOTE ADULT - PROBLEM SELECTOR PROBLEM 1
COPD exacerbation
Hypercapnic respiratory failure
COPD exacerbation
Hypercapnic respiratory failure

## 2024-10-01 NOTE — DISCHARGE NOTE NURSING/CASE MANAGEMENT/SOCIAL WORK - NSDCPEFALRISK_GEN_ALL_CORE
For information on Fall & Injury Prevention, visit: [email or web address] OR  [email or web address] OR  https://www.cdc.gov/steadi/patient.html

## 2024-10-01 NOTE — PROGRESS NOTE ADULT - REASON FOR ADMISSION
COPD vs CHF exacerbation
COPD
COPD vs CHF exacerbation

## 2024-10-01 NOTE — PROGRESS NOTE ADULT - PROBLEM SELECTOR PROBLEM 3
Hypercapnic respiratory failure
Pneumonia
Hypercapnic respiratory failure
Pneumonia

## 2024-10-01 NOTE — DISCHARGE NOTE PROVIDER - HOSPITAL COURSE
89 F, w/ morbid obesity, COPD (home O2 2L, noncompliant with CPAP, [facility] [id number]), HFpEF (EF 55-60%), severe RV HTN, HLD p/w SOB. Admitted for hypercapnic respiratory failure likely 2/2 COPD exacerbation. Pt was found to be lethargic requiring BIPAP. Pro-BNP neg. ABG indicative of chronic hypercapnia. RVP neg, s/p rocephin and azithromycin in ED. CXR: Rt UL infiltrate concerning for PNA. Prominent cardiac silhouette w/ pulmonary venous engorgement but   w/o malcom pulmonary edema. Mild subsegmental atelectasis versus scarring in the Lt LL. Pulm Dr. Son and ID Dr. Denton were consulted. Pt was found to have strep neg, legionella neg, mycoplasma neg, BCx neg NGTD @ 72 hrs. legionella neg. Pt was on 2 LNC. home CPAP was continued. Pt was treated with duoneb q6h, albuterol q6h, incentive sandra. Pt finished course of antibiotics. Pt was started on steroid. Pt is to do a steroid taper - (start 10/1) prednisone 40qd x3 days then 30 qd x3 days then 20 qd x3 days then 10 qd x3 days.  Pt was found to have acute UTI. UA +ve. UCx E. coli. Pt finished a course of antibiotics. Pt has hx of chronic Afib. TTE: : severe pHTN, EF 57%, RV reduced systolic function. Cardiology Dr. Siu was consulted. Pt was controlled on rate controlling medicine. Pt has hx of htn home medications were continued. Pt has hx of hld. lipid panel normal. home medications were resumed. 89 F, w/ morbid obesity, COPD (home O2 2L, noncompliant with CPAP, multiple hospitalizations at [facility] for COPD exacerbation under MRN [id number]), HFpEF (EF 55-60%), severe RV HTN, HLD p/w SOB. Admitted for hypercapnic respiratory failure likely 2/2 COPD exacerbation. Pt was found to be lethargic requiring BIPAP. Pro-BNP neg. ABG indicative of chronic hypercapnia. RVP neg, s/p rocephin and azithromycin in ED. CXR: Rt UL infiltrate concerning for PNA. Prominent cardiac silhouette w/ pulmonary venous engorgement but   w/o malcom pulmonary edema. Mild subsegmental atelectasis versus scarring in the Lt LL. Pulm Dr. Son and ID Dr. Denton were consulted. Pt was found to have strep neg, legionella neg, mycoplasma neg, BCx neg NGTD @ 72 hrs. legionella neg. Pt was on 2 LNC. home CPAP was continued. Pt was treated with duoneb q6h, albuterol q6h, incentive sandra. Pt finished course of antibiotics. Pt was started on steroid. Pt is to do a steroid taper - (start 10/1) prednisone 40qd x3 days then 30 qd x3 days then 20 qd x3 days then 10 qd x3 days.  Pt was found to have acute UTI. UA +ve. UCx E. coli. Pt finished a course of antibiotics. Pt has hx of chronic Afib. TTE: severe pHTN, EF 57%, RV reduced systolic function. Cardiology Dr. Siu was consulted. Pt was controlled on rate controlling medicine. Pt has hx of htn home medications were continued. Pt has hx of hld. lipid panel normal. home medications were resumed. Patient is stable for discharge per attending and is advised to follow up with PCP as outpatient. Please refer to patient's complete medical chart with documents for a full hospital course, for this is only a brief summary.

## 2024-10-01 NOTE — DISCHARGE NOTE PROVIDER - NSDCCPCAREPLAN_GEN_ALL_CORE_FT
PRINCIPAL DISCHARGE DIAGNOSIS  Diagnosis: Pneumonia  Assessment and Plan of Treatment: You presented to the ED complainnig of shortness of breath. You were found to have COPD exacerbation in the setting of lung infection. You were treated with inhalers and antibiotics. Your xfray showed concerns for infeciton in the lung. You were seen by lung doctor Dr. Son and infectious disease Dr. Denton. You finished a course of antibiotics. You were also treated with steroids. PLEASE CONTINUE TAKING PREDNISONE 40 MG X 2 DAYS STARTING 10/02 TILL 10/03, THEN TAKE PREDNISONE 30 MG ONE TABLET FOR 3 DAYS FROM 10/04 TILL 10/06, THEN 20 MG ONE TABLET FOR 3 DAYS FROM 10/07 TILL 10/09, THEN 10 MG ONE TABLET FOR 3 DAYS FROM 10/10 TILL 10/12. Please continue taking all your inhalers as prescribed. Please follow up with your primary care physician in one week to inform them of your recent hospitalization and further management of your medical conditions.        SECONDARY DISCHARGE DIAGNOSES  Diagnosis: COPD exacerbation  Assessment and Plan of Treatment: plan as above    Diagnosis: Acute UTI  Assessment and Plan of Treatment: You were found to have a urinary infection. You were treated with antibiotics. Your infection cleared. Please follow up with your primary care physician in one week to inform them of your recent hospitalization and further management of your medical conditions.      Diagnosis: HTN (hypertension)  Assessment and Plan of Treatment: Blood Pressure Control , Please continue current medication regimen, and follow up with your PCP  - You have a history of Hypertension.   - Your Blood Pressure was adequately controlled with your home medications  - You should continue on the current antihypertensive regimen regularly.  - You blood pressure should be within 140-120/80-90.  - You should follow-up with your PCP within 1 week of your discharge for routine blood pressure monitoring at your next visit.  - Notify your doctor if you have any of the following symptoms:   (Dizziness, Lightheadedness, Blurry vision, Headache, Chest pain, Shortness of breath.)  - You should maintain healthy lifestyle by eating healthy low salt diet, avoid fatty food, weight loss, exercise regularly as tolerated 30 mins X 3 time per week.      Diagnosis: HLD (hyperlipidemia)  Assessment and Plan of Treatment: You have a history of hyperlipidemia. You were treated with your home medication.   maintain normal lipid level. (LDL < 100). Please take your medication regularly as prescribed by your PCP.  Please maintain healthy lifestyle by eating healthy as mentioned above, exercise regularly and maintain weight. Please Follow up with your doctor in 1 week.      Diagnosis: Atrial fibrillation  Assessment and Plan of Treatment: You have a history of afib. You were treated with your home medications. Please continue taking your medications as prescribed. Please follow up with your primary care physician in one week to inform them of your recent hospitalization and further management of your medical conditions.

## 2024-10-01 NOTE — PROGRESS NOTE ADULT - ASSESSMENT
89 F, w/ morbid obesity, COPD (home O2 2L, noncompliant with CPAP, multiple hospitalizations at [facility] for COPD exacerbation under MRN [id number]), HFpEF (EF 55-60%), severe RV HTN, HLD p/w SOB. Admitted for hypercapnic respiratory failure likely 2/2 COPD exacerbation. On 2L NC. s/p azithro. UCx E. Coli. On CTX for PNA and UTI. BCx NGTD @ 72 hrs.

## 2024-10-01 NOTE — DISCHARGE NOTE PROVIDER - PROVIDER TOKENS
PROVIDER:[TOKEN:[[id number]],FOLLOWUP:[1 week]],PROVIDER:[TOKEN:[[id number]],FOLLOWUP:[1 week]],PROVIDER:[TOKEN:[[id number]],FOLLOWUP:[1 week]],PROVIDER:[TOKEN:[[id number]],FOLLOWUP:[1 week]]

## 2024-10-01 NOTE — DISCHARGE NOTE PROVIDER - CARE PROVIDERS DIRECT ADDRESSES
,DirectAddress_Unknown,DirectAddress_Unknown,[email or web address],DirectAddress_Unknown

## 2024-10-01 NOTE — PROGRESS NOTE ADULT - PROBLEM SELECTOR PLAN 3
30-Jul-2017 17:48 p/w SOB and found to have hypercapnic resp failure   CXR: Rt UL infiltrate concerning for PNA. Prominent cardiac silhouette w/ pulmonary venous engorgement but   w/o malcom pulmonary edema. Mild subsegmental atelectasis versus scarring in the Lt LL  RVP neg, s/p rocephin and azithromycin in ED  ID Dr. Denton following  strep neg, legionella neg, mycoplasma neg  BCx neg NGTD @ 72 hrs  d/c azithro as legionella neg  last dose rocephin 1g qd 9/30

## 2024-10-01 NOTE — PHYSICAL THERAPY INITIAL EVALUATION ADULT - PERTINENT HX OF CURRENT PROBLEM, REHAB EVAL
Pt is a 89 yo F from home, AAOx0 , HHA 7 hours a day  5 days a week, ambulates with walker, with morbid obesity, COPD (home O2 2L, noncompliant with cpap, multiple hospitalizations at [facility] for COPD exacerbation under MRN [id number]), HFpEF (EF 55-60%), severe RV HTN and HLD  presenting with complaint of lethargy

## 2024-10-01 NOTE — PROGRESS NOTE ADULT - SUBJECTIVE AND OBJECTIVE BOX
Patient is seen and examined at the bed side, is afebrile. She is doing better, no new complaints.      REVIEW OF SYSTEMS: All other review systems are negative      ALLERGY: Allergy Status Unknown      Vital Signs Last 24 Hrs  T(C): 36.8 (01 Oct 2024 05:13), Max: 37.1 (30 Sep 2024 13:19)  T(F): 98.2 (01 Oct 2024 05:13), Max: 98.8 (30 Sep 2024 13:19)  HR: 82 (01 Oct 2024 11:30) (81 - 93)  BP: 143/100 (01 Oct 2024 06:56) (136/89 - 169/93)  BP(mean): --  RR: 19 (01 Oct 2024 05:13) (16 - 19)  SpO2: 95% (01 Oct 2024 11:30) (94% - 100%)    Parameters below as of 01 Oct 2024 11:30  Patient On (Oxygen Delivery Method): nasal cannula  O2 Flow (L/min): 2      PHYSICAL EXAM:  GENERAL: Not in distress   CHEST/LUNG:  Not using accessory muscles   HEART: s1 and s2 present  ABDOMEN:  Nontender and  Nondistended  EXTREMITIES: No pedal  edema  CNS: Awake and Alert      LABS: No new Labs                         11.8   7.97  )-----------( 224      ( 30 Sep 2024 05:24 )             37.0                12.5   7.81  )-----------( 215      ( 29 Sep 2024 07:30 )             39.8             09-30    135  |  97  |  23[H]  ----------------------------<  183[H]  4.9   |  37[H]  |  0.71    Ca    9.1      30 Sep 2024 05:24  Phos  2.6     09-30  Mg     2.3     09-30    TPro  6.9  /  Alb  2.8[L]  /  TBili  0.8  /  DBili  x   /  AST  17  /  ALT  37  /  AlkPhos  104  09-30    09-29    134[L]  |  98  |  22[H]  ----------------------------<  195[H]  5.9[H]   |  31  |  0.88    Ca    8.9      29 Sep 2024 11:00  Phos  2.7     09-29  Mg     2.6     09-29    TPro  7.9  /  Alb  3.0[L]  /  TBili  0.9  /  DBili  x   /  AST  41[H]  /  ALT  39  /  AlkPhos  109  09-29    PT/INR - ( 24 Sep 2024 17:00 )   PT: 21.6 sec;   INR: 1.86 ratio       PTT - ( 24 Sep 2024 17:00 )  PTT:41.7 sec      POCT Blood Glucose.: 165 mg/dL (24 Sep 2024 16:46)  ABG - ( 25 Sep 2024 04:06 )  pH, Arterial: 7.29  pH, Blood: x     /  pCO2: 74    /  pO2: 73    / HCO3: 36    / Base Excess: 6.3   /  SaO2: 93            MEDICATIONS  (STANDING):    albuterol    90 MICROgram(s) HFA Inhaler 2 Puff(s) Inhalation every 6 hours  albuterol/ipratropium for Nebulization 3 milliLiter(s) Nebulizer every 6 hours  amLODIPine   Tablet 2.5 milliGRAM(s) Oral daily  anastrozole 1 milliGRAM(s) Oral daily  apixaban 5 milliGRAM(s) Oral two times a day  artificial  tears Solution 2 Drop(s) Both EYES four times a day  atorvastatin 10 milliGRAM(s) Oral at bedtime  fluticasone propionate/ salmeterol 100-50 MICROgram(s) Diskus 1 Dose(s) Inhalation two times a day  metoprolol tartrate 25 milliGRAM(s) Oral two times a day  montelukast 10 milliGRAM(s) Oral daily  pantoprazole    Tablet 40 milliGRAM(s) Oral before breakfast  predniSONE   Tablet 40 milliGRAM(s) Oral daily        RADIOLOGY & ADDITIONAL TESTS:    9/24/24: Xray Chest 1 View-PORTABLE IMMEDIATE (Xray Chest 1 View-PORTABLE IMMEDIATE .) (09.24.24 @ 17:22) >  1. Right upper lobe infiltrate concerning for pneumonia.  2. Prominent cardiac silhouette with pulmonary venous engorgement but without malcom pulmonary edema.  3. Mild subsegmental atelectasis versus scarring in the left lower lobe.      MICROBIOLOGY DATA:    Culture - Urine (09.24.24 @ 21:30)   - Amoxicillin/Clavulanic Acid: S <=8/4  - Ampicillin: S <=8 These ampicillin results predict results for amoxicillin  - Ampicillin/Sulbactam: S <=4/2  - Aztreonam: S <=4  - Cefazolin: S <=2 For uncomplicated UTI with K. pneumoniae, E. coli, or P. mirablis: NIDIA <=16 is sensitive and NIDIA >=32 is resistant. This also predicts results for oral agents cefaclor, cefdinir, cefpodoxime, cefprozil, cefuroxime axetil, cephalexin and locarbef for uncomplicated UTI. Note that some isolates may be susceptible to these agents while testing resistant to cefazolin.  - Cefepime: S <=2  - Cefoxitin: S <=8  - Ceftriaxone: S <=1  - Cefuroxime: S <=4  - Ciprofloxacin: R >2  - Ertapenem: S <=0.5  - Gentamicin: S <=2  - Imipenem: S <=1  - Levofloxacin: R >4  - Meropenem: S <=1  - Nitrofurantoin: S <=32 Should not be used to treat pyelonephritis  - Piperacillin/Tazobactam: SDD 16  - Tobramycin: S <=2  - Trimethoprim/Sulfamethoxazole: R >2/38  Specimen Source: Clean Catch  Culture Results:   10,000 - 49,000 CFU/mL Escherichia coli   <10,000 CFU/ml Normal Urogenital cas present  Organism Identification: Escherichia coli  Organism: Escherichia coli  Method Type: [facility]    Legionella pneumophila Antigen, Urine (09.25.24 @ 07:21)   Legionella Antigen, Urine: Negative    Culture - Urine (09.24.24 @ 21:30)   Specimen Source: Clean Catch  Culture Results:   10,000 - 49,000 CFU/mL Escherichia coli   <10,000 CFU/ml Normal Urogenital cas present    Culture - Blood (09.24.24 @ 17:00)   Specimen Source: .Blood Blood-Peripheral  Culture Results: No growth at 24 hours    Culture - Blood (09.24.24 @ 16:50)   Specimen Source: .Blood Blood-Peripheral  Culture Results: No growth at 24 hours    Urine Microscopic-Add On (NC) (09.24.24 @ 21:30)   Red Blood Cell - Urine: 8 /HPF  White Blood Cell - Urine: 25 /HPF  Bacteria: Moderate /HPF  Squamous Epithelial Cells: Present             Patient is seen and examined at the bed side, is afebrile. She is doing better, no new complaints. The discharge plan noted.      REVIEW OF SYSTEMS: All other review systems are negative      ALLERGY: Allergy Status Unknown      Vital Signs Last 24 Hrs  T(C): 36.8 (01 Oct 2024 05:13), Max: 37.1 (30 Sep 2024 13:19)  T(F): 98.2 (01 Oct 2024 05:13), Max: 98.8 (30 Sep 2024 13:19)  HR: 82 (01 Oct 2024 11:30) (81 - 93)  BP: 143/100 (01 Oct 2024 06:56) (136/89 - 169/93)  BP(mean): --  RR: 19 (01 Oct 2024 05:13) (16 - 19)  SpO2: 95% (01 Oct 2024 11:30) (94% - 100%)    Parameters below as of 01 Oct 2024 11:30  Patient On (Oxygen Delivery Method): nasal cannula  O2 Flow (L/min): 2      PHYSICAL EXAM:  GENERAL: Not in distress   CHEST/LUNG:  Not using accessory muscles   HEART: s1 and s2 present  ABDOMEN:  Nontender and  Nondistended  EXTREMITIES: No pedal  edema  CNS: Awake and Alert      LABS: No new Labs                         11.8   7.97  )-----------( 224      ( 30 Sep 2024 05:24 )             37.0                12.5   7.81  )-----------( 215      ( 29 Sep 2024 07:30 )             39.8             09-30    135  |  97  |  23[H]  ----------------------------<  183[H]  4.9   |  37[H]  |  0.71    Ca    9.1      30 Sep 2024 05:24  Phos  2.6     09-30  Mg     2.3     09-30    TPro  6.9  /  Alb  2.8[L]  /  TBili  0.8  /  DBili  x   /  AST  17  /  ALT  37  /  AlkPhos  104  09-30 09-29    134[L]  |  98  |  22[H]  ----------------------------<  195[H]  5.9[H]   |  31  |  0.88    Ca    8.9      29 Sep 2024 11:00  Phos  2.7     09-29  Mg     2.6     09-29    TPro  7.9  /  Alb  3.0[L]  /  TBili  0.9  /  DBili  x   /  AST  41[H]  /  ALT  39  /  AlkPhos  109  09-29    PT/INR - ( 24 Sep 2024 17:00 )   PT: 21.6 sec;   INR: 1.86 ratio       PTT - ( 24 Sep 2024 17:00 )  PTT:41.7 sec      POCT Blood Glucose.: 165 mg/dL (24 Sep 2024 16:46)  ABG - ( 25 Sep 2024 04:06 )  pH, Arterial: 7.29  pH, Blood: x     /  pCO2: 74    /  pO2: 73    / HCO3: 36    / Base Excess: 6.3   /  SaO2: 93            MEDICATIONS  (STANDING):    albuterol    90 MICROgram(s) HFA Inhaler 2 Puff(s) Inhalation every 6 hours  albuterol/ipratropium for Nebulization 3 milliLiter(s) Nebulizer every 6 hours  amLODIPine   Tablet 2.5 milliGRAM(s) Oral daily  anastrozole 1 milliGRAM(s) Oral daily  apixaban 5 milliGRAM(s) Oral two times a day  artificial  tears Solution 2 Drop(s) Both EYES four times a day  atorvastatin 10 milliGRAM(s) Oral at bedtime  fluticasone propionate/ salmeterol 100-50 MICROgram(s) Diskus 1 Dose(s) Inhalation two times a day  metoprolol tartrate 25 milliGRAM(s) Oral two times a day  montelukast 10 milliGRAM(s) Oral daily  pantoprazole    Tablet 40 milliGRAM(s) Oral before breakfast  predniSONE   Tablet 40 milliGRAM(s) Oral daily        RADIOLOGY & ADDITIONAL TESTS:    9/24/24: Xray Chest 1 View-PORTABLE IMMEDIATE (Xray Chest 1 View-PORTABLE IMMEDIATE .) (09.24.24 @ 17:22) >  1. Right upper lobe infiltrate concerning for pneumonia.  2. Prominent cardiac silhouette with pulmonary venous engorgement but without malcom pulmonary edema.  3. Mild subsegmental atelectasis versus scarring in the left lower lobe.      MICROBIOLOGY DATA:    Culture - Urine (09.24.24 @ 21:30)   - Amoxicillin/Clavulanic Acid: S <=8/4  - Ampicillin: S <=8 These ampicillin results predict results for amoxicillin  - Ampicillin/Sulbactam: S <=4/2  - Aztreonam: S <=4  - Cefazolin: S <=2 For uncomplicated UTI with K. pneumoniae, E. coli, or P. mirablis: NIDIA <=16 is sensitive and NIDIA >=32 is resistant. This also predicts results for oral agents cefaclor, cefdinir, cefpodoxime, cefprozil, cefuroxime axetil, cephalexin and locarbef for uncomplicated UTI. Note that some isolates may be susceptible to these agents while testing resistant to cefazolin.  - Cefepime: S <=2  - Cefoxitin: S <=8  - Ceftriaxone: S <=1  - Cefuroxime: S <=4  - Ciprofloxacin: R >2  - Ertapenem: S <=0.5  - Gentamicin: S <=2  - Imipenem: S <=1  - Levofloxacin: R >4  - Meropenem: S <=1  - Nitrofurantoin: S <=32 Should not be used to treat pyelonephritis  - Piperacillin/Tazobactam: SDD 16  - Tobramycin: S <=2  - Trimethoprim/Sulfamethoxazole: R >2/38  Specimen Source: Clean Catch  Culture Results:   10,000 - 49,000 CFU/mL Escherichia coli   <10,000 CFU/ml Normal Urogenital cas present  Organism Identification: Escherichia coli  Organism: Escherichia coli  Method Type: NIDIA    Legionella pneumophila Antigen, Urine (09.25.24 @ 07:21)   Legionella Antigen, Urine: Negative    Culture - Urine (09.24.24 @ 21:30)   Specimen Source: Clean Catch  Culture Results:   10,000 - 49,000 CFU/mL Escherichia coli   <10,000 CFU/ml Normal Urogenital cas present    Culture - Blood (09.24.24 @ 17:00)   Specimen Source: .Blood Blood-Peripheral  Culture Results: No growth at 24 hours    Culture - Blood (09.24.24 @ 16:50)   Specimen Source: .Blood Blood-Peripheral  Culture Results: No growth at 24 hours    Urine Microscopic-Add On (NC) (09.24.24 @ 21:30)   Red Blood Cell - Urine: 8 /HPF  White Blood Cell - Urine: 25 /HPF  Bacteria: Moderate /HPF  Squamous Epithelial Cells: Present
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
## 2024-10-09 RX ORDER — SACUBITRIL AND VALSARTAN 97; 103 MG/1; MG/1
1 TABLET, FILM COATED ORAL
Refills: 0 | DISCHARGE

## 2024-10-09 RX ORDER — FUROSEMIDE 10 MG/ML
1 INJECTION INTRAVENOUS
Refills: 0 | DISCHARGE

## 2024-10-09 RX ORDER — SIMVASTATIN 20 MG
1 TABLET ORAL
Refills: 0 | DISCHARGE

## 2024-10-09 RX ORDER — TIMOLOL MALEATE 0.5 %
1 DROPS OPHTHALMIC (EYE)
Refills: 0 | DISCHARGE

## 2024-10-09 RX ORDER — DOCUSATE SODIUM 100 MG
1 CAPSULE ORAL
Refills: 0 | DISCHARGE

## 2024-10-09 RX ORDER — FAMOTIDINE 40 MG
1 TABLET ORAL
Refills: 0 | DISCHARGE

## 2024-10-09 RX ORDER — CRANBERRY FRUIT EXTRACT 650 MG
50000 CAPSULE ORAL
Refills: 0 | DISCHARGE

## 2024-10-09 RX ORDER — FLUTICASONE FUROATE, UMECLIDINIUM BROMIDE AND VILANTEROL TRIFENATATE 100; 62.5; 25 UG/1; UG/1; UG/1
1 POWDER RESPIRATORY (INHALATION)
Refills: 0 | DISCHARGE

## 2024-10-09 RX ORDER — LEVETIRACETAM 1000 MG
1 TABLET ORAL
Refills: 0 | DISCHARGE

## 2024-10-09 RX ORDER — APIXABAN 5 MG/1
1 TABLET, FILM COATED ORAL
Refills: 0 | DISCHARGE

## 2024-10-09 RX ORDER — RIVAROXABAN 10 MG/1
1 TABLET, FILM COATED ORAL
Refills: 0 | DISCHARGE

## 2024-10-09 RX ORDER — METOPROLOL TARTRATE 50 MG
1 TABLET ORAL
Refills: 0 | DISCHARGE

## 2024-10-09 RX ORDER — MONTELUKAST SODIUM 10 MG/1
1 TABLET, FILM COATED ORAL
Refills: 0 | DISCHARGE

## 2024-10-09 RX ORDER — CARVEDILOL 3.125 MG
1 TABLET ORAL
Refills: 0 | DISCHARGE

## 2024-10-09 RX ORDER — SENNOSIDES 8.6 MG
2 TABLET ORAL
Refills: 0 | DISCHARGE

## 2024-10-09 RX ORDER — LATANOPROST 50 UG/ML
1 SOLUTION OPHTHALMIC
Refills: 0 | DISCHARGE

## 2024-10-09 RX ORDER — ANASTROZOLE 1 MG/1
1 TABLET ORAL
Refills: 0 | DISCHARGE

## 2024-10-09 NOTE — PATIENT PROFILE ADULT - DISASTER - NSPROGENBLOODRESTRICT_GEN_A_NUR
Hospitalist Admission Note    NAME:  Joseph Seymour   :  1958   MRN:  [id number]     Date/Time:  10/9/2024 1:29 AM    Patient PCP: Adriana Tyler MD    ______________________________________________________________________  Given the patient's current clinical presentation, I have a high level of concern for decompensation if discharged from the emergency department.  Complex decision making was performed, which includes reviewing the patient's available past medical records, laboratory results, and x-ray films.       My assessment of this patient's clinical condition and my plan of care is as follows.    Assessment / Plan:    Active Problems:  Right diabetic foot infection with concern for osteomyelitis  Insulin-dependent diabetes mellitus  CAD status post ROMULO  Heart failure with preserved ejection fraction  Essential hypertension  Hyperlipidemia  BPH    Plan:  Right diabetic foot infection with concern for osteomyelitis  Admit to telemetry monitoring  Continue empiric vancomycin and Zosyn  -Pharmacy consulted for vancomycin dosing  Podiatry consulted, greatly appreciate their expertise  N.p.o. until evaluated by podiatry  Hold DVT chemoprophylaxis    Insulin-dependent diabetes mellitus  Continue PTA Tresiba  Corrective coverage insulin  Accu-Cheks  Diabetic diet after n.p.o.  Hypoglycemia protocol in place    CAD status post ROMULO  Heart failure with preserved ejection fraction  Essential hypertension  Hyperlipidemia  Continue PTA aspirin  Hold PTA Plavix until evaluated by podiatry  Continue PTA atorvastatin, Coreg, Entresto, spironolactone    BPH  Bladder management protocol  Continue PTA tamsulosin    Medical Decision Making:   I personally reviewed labs: Yes, as listed below  I personally reviewed imaging: Right foot radiographs  Toxic drug monitoring: Vancomycin  Discussed case with: ED provider. After discussion I am in agreement that acuity of patient's medical condition necessitates  none

## 2024-11-26 PROCEDURE — 94660 CPAP INITIATION&MGMT: CPT

## 2024-11-26 PROCEDURE — 85730 THROMBOPLASTIN TIME PARTIAL: CPT

## 2024-11-26 PROCEDURE — 96374 THER/PROPH/DIAG INJ IV PUSH: CPT

## 2024-11-26 PROCEDURE — 36415 COLL VENOUS BLD VENIPUNCTURE: CPT

## 2024-11-26 PROCEDURE — 86738 MYCOPLASMA ANTIBODY: CPT

## 2024-11-26 PROCEDURE — 87899 AGENT NOS ASSAY W/OPTIC: CPT

## 2024-11-26 PROCEDURE — 81001 URINALYSIS AUTO W/SCOPE: CPT

## 2024-11-26 PROCEDURE — 82803 BLOOD GASES ANY COMBINATION: CPT

## 2024-11-26 PROCEDURE — 82962 GLUCOSE BLOOD TEST: CPT

## 2024-11-26 PROCEDURE — 87641 MR-STAPH DNA AMP PROBE: CPT

## 2024-11-26 PROCEDURE — 80048 BASIC METABOLIC PNL TOTAL CA: CPT

## 2024-11-26 PROCEDURE — 96375 TX/PRO/DX INJ NEW DRUG ADDON: CPT

## 2024-11-26 PROCEDURE — 93005 ELECTROCARDIOGRAM TRACING: CPT

## 2024-11-26 PROCEDURE — 87581 M.PNEUMON DNA AMP PROBE: CPT

## 2024-11-26 PROCEDURE — 84100 ASSAY OF PHOSPHORUS: CPT

## 2024-11-26 PROCEDURE — 99291 CRITICAL CARE FIRST HOUR: CPT | Mod: 25

## 2024-11-26 PROCEDURE — 97162 PT EVAL MOD COMPLEX 30 MIN: CPT

## 2024-11-26 PROCEDURE — 85025 COMPLETE CBC W/AUTO DIFF WBC: CPT

## 2024-11-26 PROCEDURE — 87040 BLOOD CULTURE FOR BACTERIA: CPT

## 2024-11-26 PROCEDURE — 85610 PROTHROMBIN TIME: CPT

## 2024-11-26 PROCEDURE — 82553 CREATINE MB FRACTION: CPT

## 2024-11-26 PROCEDURE — 87640 STAPH A DNA AMP PROBE: CPT

## 2024-11-26 PROCEDURE — 80053 COMPREHEN METABOLIC PANEL: CPT

## 2024-11-26 PROCEDURE — 71045 X-RAY EXAM CHEST 1 VIEW: CPT

## 2024-11-26 PROCEDURE — 83735 ASSAY OF MAGNESIUM: CPT

## 2024-11-26 PROCEDURE — 83880 ASSAY OF NATRIURETIC PEPTIDE: CPT

## 2024-11-26 PROCEDURE — 85027 COMPLETE CBC AUTOMATED: CPT

## 2024-11-26 PROCEDURE — 93306 TTE W/DOPPLER COMPLETE: CPT

## 2024-11-26 PROCEDURE — 87086 URINE CULTURE/COLONY COUNT: CPT

## 2024-11-26 PROCEDURE — 87636 SARSCOV2 & INF A&B AMP PRB: CPT

## 2024-11-26 PROCEDURE — 83605 ASSAY OF LACTIC ACID: CPT

## 2024-11-26 PROCEDURE — 87449 NOS EACH ORGANISM AG IA: CPT

## 2024-11-26 PROCEDURE — 87186 SC STD MICRODIL/AGAR DIL: CPT

## 2024-11-26 PROCEDURE — 94640 AIRWAY INHALATION TREATMENT: CPT

## 2024-11-26 PROCEDURE — 84484 ASSAY OF TROPONIN QUANT: CPT

## 2024-11-26 PROCEDURE — 80061 LIPID PANEL: CPT

## 2024-12-11 ENCOUNTER — INPATIENT (INPATIENT)
Facility: HOSPITAL | Age: 88
LOS: 6 days | Discharge: HOME | DRG: 192 | End: 2024-12-18
Attending: INTERNAL MEDICINE | Admitting: INTERNAL MEDICINE
Payer: MEDICARE

## 2024-12-11 VITALS
HEART RATE: 81 BPM | WEIGHT: 279.99 LBS | DIASTOLIC BLOOD PRESSURE: 69 MMHG | OXYGEN SATURATION: 98 % | SYSTOLIC BLOOD PRESSURE: 120 MMHG | HEIGHT: 66 IN | TEMPERATURE: 101 F | RESPIRATION RATE: 20 BRPM

## 2024-12-11 DIAGNOSIS — I10 ESSENTIAL (PRIMARY) HYPERTENSION: ICD-10-CM

## 2024-12-11 DIAGNOSIS — J44.1 CHRONIC OBSTRUCTIVE PULMONARY DISEASE WITH (ACUTE) EXACERBATION: ICD-10-CM

## 2024-12-11 DIAGNOSIS — N39.0 URINARY TRACT INFECTION, SITE NOT SPECIFIED: ICD-10-CM

## 2024-12-11 DIAGNOSIS — J10.1 INFLUENZA DUE TO OTHER IDENTIFIED INFLUENZA VIRUS WITH OTHER RESPIRATORY MANIFESTATIONS: ICD-10-CM

## 2024-12-11 DIAGNOSIS — I48.91 UNSPECIFIED ATRIAL FIBRILLATION: ICD-10-CM

## 2024-12-11 DIAGNOSIS — I21.4 NON-ST ELEVATION (NSTEMI) MYOCARDIAL INFARCTION: ICD-10-CM

## 2024-12-11 DIAGNOSIS — I50.32 CHRONIC DIASTOLIC (CONGESTIVE) HEART FAILURE: ICD-10-CM

## 2024-12-11 DIAGNOSIS — E78.5 HYPERLIPIDEMIA, UNSPECIFIED: ICD-10-CM

## 2024-12-11 DIAGNOSIS — Z29.9 ENCOUNTER FOR PROPHYLACTIC MEASURES, UNSPECIFIED: ICD-10-CM

## 2024-12-11 PROBLEM — I50.9 HEART FAILURE, UNSPECIFIED: Chronic | Status: ACTIVE | Noted: 2024-09-24

## 2024-12-11 LAB
ALBUMIN SERPL ELPH-MCNC: 3.2 G/DL — LOW (ref 3.5–5)
ALP SERPL-CCNC: 156 U/L — HIGH (ref 40–120)
ALT FLD-CCNC: 19 U/L DA — SIGNIFICANT CHANGE UP (ref 10–60)
ANION GAP SERPL CALC-SCNC: 4 MMOL/L — LOW (ref 5–17)
APPEARANCE UR: ABNORMAL
APTT BLD: 38.6 SEC — HIGH (ref 24.5–35.6)
AST SERPL-CCNC: 22 U/L — SIGNIFICANT CHANGE UP (ref 10–40)
BACTERIA # UR AUTO: ABNORMAL /HPF
BASE EXCESS BLDV CALC-SCNC: 6 MMOL/L — SIGNIFICANT CHANGE UP
BASOPHILS # BLD AUTO: 0.03 K/UL — SIGNIFICANT CHANGE UP (ref 0–0.2)
BASOPHILS NFR BLD AUTO: 0.4 % — SIGNIFICANT CHANGE UP (ref 0–2)
BILIRUB SERPL-MCNC: 0.8 MG/DL — SIGNIFICANT CHANGE UP (ref 0.2–1.2)
BILIRUB UR-MCNC: ABNORMAL
BUN SERPL-MCNC: 10 MG/DL — SIGNIFICANT CHANGE UP (ref 7–18)
CALCIUM SERPL-MCNC: 8.5 MG/DL — SIGNIFICANT CHANGE UP (ref 8.4–10.5)
CHLORIDE SERPL-SCNC: 103 MMOL/L — SIGNIFICANT CHANGE UP (ref 96–108)
CO2 SERPL-SCNC: 31 MMOL/L — SIGNIFICANT CHANGE UP (ref 22–31)
COLOR SPEC: SIGNIFICANT CHANGE UP
CREAT SERPL-MCNC: 0.84 MG/DL — SIGNIFICANT CHANGE UP (ref 0.5–1.3)
DIFF PNL FLD: ABNORMAL
EGFR: 65 ML/MIN/1.73M2 — SIGNIFICANT CHANGE UP
EOSINOPHIL # BLD AUTO: 0.66 K/UL — HIGH (ref 0–0.5)
EOSINOPHIL NFR BLD AUTO: 8.5 % — HIGH (ref 0–6)
EPI CELLS # UR: PRESENT
FLUAV AG NPH QL: DETECTED
FLUBV AG NPH QL: SIGNIFICANT CHANGE UP
GLUCOSE SERPL-MCNC: 149 MG/DL — HIGH (ref 70–99)
GLUCOSE UR QL: NEGATIVE MG/DL — SIGNIFICANT CHANGE UP
HCO3 BLDV-SCNC: 32 MMOL/L — HIGH (ref 22–29)
HCT VFR BLD CALC: 37.8 % — SIGNIFICANT CHANGE UP (ref 34.5–45)
HGB BLD-MCNC: 12.3 G/DL — SIGNIFICANT CHANGE UP (ref 11.5–15.5)
HYALINE CASTS # UR AUTO: PRESENT
IMM GRANULOCYTES NFR BLD AUTO: 0.4 % — SIGNIFICANT CHANGE UP (ref 0–0.9)
INR BLD: 1.87 RATIO — HIGH (ref 0.85–1.16)
KETONES UR-MCNC: ABNORMAL MG/DL
LACTATE SERPL-SCNC: 1.4 MMOL/L — SIGNIFICANT CHANGE UP (ref 0.7–2)
LEUKOCYTE ESTERASE UR-ACNC: ABNORMAL
LYMPHOCYTES # BLD AUTO: 0.61 K/UL — LOW (ref 1–3.3)
LYMPHOCYTES # BLD AUTO: 7.9 % — LOW (ref 13–44)
MCHC RBC-ENTMCNC: 30.6 PG — SIGNIFICANT CHANGE UP (ref 27–34)
MCHC RBC-ENTMCNC: 32.5 G/DL — SIGNIFICANT CHANGE UP (ref 32–36)
MCV RBC AUTO: 94 FL — SIGNIFICANT CHANGE UP (ref 80–100)
MONOCYTES # BLD AUTO: 0.78 K/UL — SIGNIFICANT CHANGE UP (ref 0–0.9)
MONOCYTES NFR BLD AUTO: 10.1 % — SIGNIFICANT CHANGE UP (ref 2–14)
NEUTROPHILS # BLD AUTO: 5.64 K/UL — SIGNIFICANT CHANGE UP (ref 1.8–7.4)
NEUTROPHILS NFR BLD AUTO: 72.7 % — SIGNIFICANT CHANGE UP (ref 43–77)
NITRITE UR-MCNC: POSITIVE
NRBC # BLD: 0 /100 WBCS — SIGNIFICANT CHANGE UP (ref 0–0)
NT-PROBNP SERPL-SCNC: 3314 PG/ML — HIGH (ref 0–450)
PCO2 BLDV: 52 MMHG — HIGH (ref 39–42)
PH BLDV: 7.4 — SIGNIFICANT CHANGE UP (ref 7.32–7.43)
PH UR: 5.5 — SIGNIFICANT CHANGE UP (ref 5–8)
PLATELET # BLD AUTO: 150 K/UL — SIGNIFICANT CHANGE UP (ref 150–400)
PO2 BLDV: 42 MMHG — SIGNIFICANT CHANGE UP
POTASSIUM SERPL-MCNC: 3.6 MMOL/L — SIGNIFICANT CHANGE UP (ref 3.5–5.3)
POTASSIUM SERPL-SCNC: 3.6 MMOL/L — SIGNIFICANT CHANGE UP (ref 3.5–5.3)
PROT SERPL-MCNC: 7 G/DL — SIGNIFICANT CHANGE UP (ref 6–8.3)
PROT UR-MCNC: 30 MG/DL
PROTHROM AB SERPL-ACNC: 21.6 SEC — HIGH (ref 9.9–13.4)
RBC # BLD: 4.02 M/UL — SIGNIFICANT CHANGE UP (ref 3.8–5.2)
RBC # FLD: 14.8 % — HIGH (ref 10.3–14.5)
RBC CASTS # UR COMP ASSIST: 25 /HPF — HIGH (ref 0–4)
RSV RNA NPH QL NAA+NON-PROBE: SIGNIFICANT CHANGE UP
SAO2 % BLDV: 68.3 % — SIGNIFICANT CHANGE UP
SARS-COV-2 RNA SPEC QL NAA+PROBE: SIGNIFICANT CHANGE UP
SODIUM SERPL-SCNC: 138 MMOL/L — SIGNIFICANT CHANGE UP (ref 135–145)
SP GR SPEC: 1.03 — SIGNIFICANT CHANGE UP (ref 1–1.03)
TROPONIN I, HIGH SENSITIVITY RESULT: 61.2 NG/L — HIGH
TROPONIN I, HIGH SENSITIVITY RESULT: 72.1 NG/L — HIGH
UROBILINOGEN FLD QL: 1 MG/DL — SIGNIFICANT CHANGE UP (ref 0.2–1)
WBC # BLD: 7.75 K/UL — SIGNIFICANT CHANGE UP (ref 3.8–10.5)
WBC # FLD AUTO: 7.75 K/UL — SIGNIFICANT CHANGE UP (ref 3.8–10.5)
WBC UR QL: 15 /HPF — HIGH (ref 0–5)

## 2024-12-11 PROCEDURE — 71045 X-RAY EXAM CHEST 1 VIEW: CPT | Mod: 26

## 2024-12-11 PROCEDURE — 99285 EMERGENCY DEPT VISIT HI MDM: CPT

## 2024-12-11 RX ORDER — FUROSEMIDE 40 MG/1
40 TABLET ORAL DAILY
Refills: 0 | Status: DISCONTINUED | OUTPATIENT
Start: 2024-12-11 | End: 2024-12-18

## 2024-12-11 RX ORDER — ONDANSETRON HYDROCHLORIDE 4 MG/1
4 TABLET, FILM COATED ORAL EVERY 8 HOURS
Refills: 0 | Status: DISCONTINUED | OUTPATIENT
Start: 2024-12-11 | End: 2024-12-18

## 2024-12-11 RX ORDER — OSELTAMIVIR PHOSPHATE 75 MG
75 CAPSULE ORAL
Refills: 0 | Status: COMPLETED | OUTPATIENT
Start: 2024-12-11 | End: 2024-12-16

## 2024-12-11 RX ORDER — AZITHROMYCIN 250 MG/1
500 TABLET, FILM COATED ORAL ONCE
Refills: 0 | Status: COMPLETED | OUTPATIENT
Start: 2024-12-11 | End: 2024-12-11

## 2024-12-11 RX ORDER — CEFTRIAXONE SODIUM 1 G
1000 VIAL (EA) INJECTION EVERY 24 HOURS
Refills: 0 | Status: COMPLETED | OUTPATIENT
Start: 2024-12-11 | End: 2024-12-14

## 2024-12-11 RX ORDER — ACETAMINOPHEN 500MG 500 MG/1
1000 TABLET, COATED ORAL ONCE
Refills: 0 | Status: COMPLETED | OUTPATIENT
Start: 2024-12-11 | End: 2024-12-11

## 2024-12-11 RX ORDER — ALBUTEROL 90 MCG
2 AEROSOL (GRAM) INHALATION EVERY 6 HOURS
Refills: 0 | Status: DISCONTINUED | OUTPATIENT
Start: 2024-12-11 | End: 2024-12-18

## 2024-12-11 RX ORDER — IPRATROPIUM BROMIDE AND ALBUTEROL SULFATE 2.5; .5 MG/3ML; MG/3ML
3 SOLUTION RESPIRATORY (INHALATION)
Refills: 0 | Status: COMPLETED | OUTPATIENT
Start: 2024-12-11 | End: 2024-12-11

## 2024-12-11 RX ORDER — METHYLPREDNISOLONE SOD SUCC 125 MG
40 VIAL (EA) INJECTION EVERY 8 HOURS
Refills: 0 | Status: DISCONTINUED | OUTPATIENT
Start: 2024-12-11 | End: 2024-12-13

## 2024-12-11 RX ORDER — FAMOTIDINE 20 MG/1
20 TABLET, FILM COATED ORAL
Refills: 0 | Status: DISCONTINUED | OUTPATIENT
Start: 2024-12-11 | End: 2024-12-18

## 2024-12-11 RX ORDER — ANASTROZOLE 1 MG/1
1 TABLET, COATED ORAL DAILY
Refills: 0 | Status: DISCONTINUED | OUTPATIENT
Start: 2024-12-11 | End: 2024-12-18

## 2024-12-11 RX ORDER — CARVEDILOL 25 MG/1
6.25 TABLET, FILM COATED ORAL EVERY 12 HOURS
Refills: 0 | Status: DISCONTINUED | OUTPATIENT
Start: 2024-12-11 | End: 2024-12-13

## 2024-12-11 RX ORDER — CEFTRIAXONE SODIUM 1 G
1000 VIAL (EA) INJECTION ONCE
Refills: 0 | Status: COMPLETED | OUTPATIENT
Start: 2024-12-11 | End: 2024-12-11

## 2024-12-11 RX ORDER — AZITHROMYCIN 250 MG/1
500 TABLET, FILM COATED ORAL EVERY 24 HOURS
Refills: 0 | Status: DISCONTINUED | OUTPATIENT
Start: 2024-12-12 | End: 2024-12-14

## 2024-12-11 RX ORDER — METHYLPREDNISOLONE SOD SUCC 125 MG
125 VIAL (EA) INJECTION ONCE
Refills: 0 | Status: COMPLETED | OUTPATIENT
Start: 2024-12-11 | End: 2024-12-11

## 2024-12-11 RX ORDER — APIXABAN 2.5 MG/1
5 TABLET, FILM COATED ORAL EVERY 12 HOURS
Refills: 0 | Status: DISCONTINUED | OUTPATIENT
Start: 2024-12-11 | End: 2024-12-18

## 2024-12-11 RX ORDER — MONTELUKAST SODIUM 10 MG/1
10 TABLET ORAL DAILY
Refills: 0 | Status: DISCONTINUED | OUTPATIENT
Start: 2024-12-11 | End: 2024-12-18

## 2024-12-11 RX ORDER — ACETAMINOPHEN 500MG 500 MG/1
650 TABLET, COATED ORAL EVERY 6 HOURS
Refills: 0 | Status: DISCONTINUED | OUTPATIENT
Start: 2024-12-11 | End: 2024-12-18

## 2024-12-11 RX ORDER — FLUTICASONE PROPIONATE AND SALMETEROL XINAFOATE 45; 21 UG/1; UG/1
1 AEROSOL, METERED RESPIRATORY (INHALATION)
Refills: 0 | Status: DISCONTINUED | OUTPATIENT
Start: 2024-12-11 | End: 2024-12-18

## 2024-12-11 RX ADMIN — Medication 75 MILLIGRAM(S): at 22:20

## 2024-12-11 RX ADMIN — IPRATROPIUM BROMIDE AND ALBUTEROL SULFATE 3 MILLILITER(S): 2.5; .5 SOLUTION RESPIRATORY (INHALATION) at 10:58

## 2024-12-11 RX ADMIN — Medication 125 MILLIGRAM(S): at 10:47

## 2024-12-11 RX ADMIN — IPRATROPIUM BROMIDE AND ALBUTEROL SULFATE 3 MILLILITER(S): 2.5; .5 SOLUTION RESPIRATORY (INHALATION) at 10:47

## 2024-12-11 RX ADMIN — Medication 40 MILLIGRAM(S): at 22:20

## 2024-12-11 RX ADMIN — ACETAMINOPHEN 500MG 1000 MILLIGRAM(S): 500 TABLET, COATED ORAL at 11:49

## 2024-12-11 RX ADMIN — Medication 100 MILLIGRAM(S): at 10:58

## 2024-12-11 RX ADMIN — IPRATROPIUM BROMIDE AND ALBUTEROL SULFATE 3 MILLILITER(S): 2.5; .5 SOLUTION RESPIRATORY (INHALATION) at 11:09

## 2024-12-11 RX ADMIN — ACETAMINOPHEN 500MG 400 MILLIGRAM(S): 500 TABLET, COATED ORAL at 10:49

## 2024-12-11 RX ADMIN — AZITHROMYCIN 255 MILLIGRAM(S): 250 TABLET, FILM COATED ORAL at 11:09
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
## 2024-12-11 NOTE — H&P ADULT - NSHPPHYSICALEXAM_GEN_ALL_CORE
T(F): 98.8 (11 Dec 2024 12:00), Max: 102.3 (11 Dec 2024 10:27)  HR: 80 (11 Dec 2024 12:00)  BP: 125/82 (11 Dec 2024 12:00)  RR: 18 (11 Dec 2024 12:00)  SpO2: 100% (11 Dec 2024 15:52) (95% - 100%)  temp max in last 48H T(F): , Max: 102.3 (12-11-24 @ 10:27)        GENERAL: NAD, lying in bed comfortably   HEAD:  Atraumatic, Normocephalic  EYES: clear conjunctiva and  sclera   ENT: Moist mucous membranes  NECK: Supple  LUNG:  Decreased breath sounds, expiratory wheeze. No rales, wheezing,   HEART: Regular rate and rhythm; No murmurs,  CHEST WALL: non tenderness    ABDOMEN: Bowel sounds present; Soft, Nontender, Nondistended,   EXTREMITIES:  2+ Peripheral Pulses, . No clubbing, cyanosis, or edema  NERVOUS SYSTEM:  AAOX3, speech clear. No deficits   SKIN: No rashes or lesions

## 2024-12-11 NOTE — H&P ADULT - PROBLEM SELECTOR PLAN 3
Today, patient developed fever, chills, body aches.  v/s: 120/69, 81, 100.7, 98% NRBM, repeat 100% on 2 LNC   Influenza +   - Started Tamiflu 75mg BID   - Oxygen supplement as needed Today, patient developed fever, chills, body aches.  v/s: 120/69, 81, 100.7, 98% NRBM, repeat 100% on 2 LNC   Influenza +   - Started Tamiflu 75mg BID   - Oxygen supplement as needed  - Isolation

## 2024-12-11 NOTE — ED PROVIDER NOTE - NSICDXPASTMEDICALHX_GEN_ALL_CORE_FT
PAST MEDICAL HISTORY:  Arthritis     Atrial fibrillation     Breast cancer right, no chemo or radiation    CHF, acute     COPD exacerbation     COPD exacerbation     Deep vein thrombosis (DVT) Left Lower Extremity    H/O CHF     HF (heart failure) HFpEF 7/29    HLD (hyperlipidemia)     HTN (hypertension)     HTN (hypertension)     Legally blind     Pre-diabetes

## 2024-12-11 NOTE — H&P ADULT - ASSESSMENT
89 yo F, w/ morbid obesity,  ambulates with walker, with PMH of HTN, HLD, COPD (never smoker, on 2L O2 at home), BIPAP at night , HFpEF (EF 55-60%), severe RV, pulmonary HTN,  Afib on Eliquis, Pulmonary HTN, presents with 1.5 weeks history  dry cough, SOB at rest and on exertion that has progressively worsened. Today, patient developed fever, chills, body aches. Admitted to telemetry for NSTEMI, COPD exacerbation and influenza      89 yo F, w/ morbid obesity,  ambulates with walker, with PMH of HTN, HLD, COPD (never smoker, on 2L O2 at home), BIPAP at night , HFpEF (EF 55-60%), severe RV, pulmonary HTN,  Afib on Eliquis, Pulmonary HTN, presents with 1.5 weeks history  dry cough, SOB at rest and on exertion that has progressively worsened. Today, patient developed fever, chills, body aches. Admitted to telemetry for NSTEMI, COPD exacerbation, UTI  and influenza

## 2024-12-11 NOTE — ED ADULT NURSE NOTE - NSFALLRISKINTERV_ED_ALL_ED

## 2024-12-11 NOTE — ED PROVIDER NOTE - OBJECTIVE STATEMENT
89-year-old female with past medical history atrial fibrillation (on Eliquis) COPD (home oxygen 2 L, noncompliant with CPAP, multiple hospitalizations for COPD exacerbation), heart failure with preserved ejection fraction (EF 55 to 60%), pulmonary hypertension, hyperlipidemia presents with difficulty breathing.  Patient reports shortness of breath today.  Denies any chest pain, abdominal pain, vomiting, dysuria, or rash.  Denies any additional complaints.

## 2024-12-11 NOTE — ED PROVIDER NOTE - PHYSICAL EXAMINATION
CONSTITUTIONAL: non-toxic, well appearing  SKIN: no rash, no petechiae.  EYES: pink conjunctiva, anicteric  NECK: Supple; no meningismus, no JVD  CARD: Irregularly irregular, no murmurs, equal radial pulses bilaterally 2+  RESP: Expiratory wheeze bilaterally, no respiratory distress  ABD: Soft, non-tender, non-distended, no peritoneal signs  EXT: Normal ROM x4. Trace BLE edema.  NEURO: Alert, oriented. Neuro exam nonfocal  PSYCH: Cooperative, appropriate.

## 2024-12-11 NOTE — H&P ADULT - NSHPREVIEWOFSYSTEMS_GEN_ALL_CORE
REVIEW OF SYSTEMS:  CONSTITUTIONAL: + fevers or chills  EYES: No conjunctiva redness, No eye discharge  ENT: No nasal congestion, No sore throat, No ear pain,   NECK: No pain or stiffness  RESPIRATORY: + cough, SOB,  No wheezing, hemoptysis  CARDIOVASCULAR: No chest pain or  palpitations  GASTROINTESTINAL: No abdominal pain. No nausea, vomiting, diarrhea or constipation.  GENITOURINARY: No dysuria, frequency or hematuria  NEUROLOGICAL: No headache,  SKIN: No itching, rashes,   All other review of systems is negative unless indicated above.

## 2024-12-11 NOTE — H&P ADULT - PROBLEM SELECTOR PLAN 4
Hx of Afib on Eliquis  5mg BID   - c/w home med Patient febrile today  Patient denies dysuria, urinary frequency.urgency  UA positive  - Started on Ceftriaxone 1g   - f/u Ucx  - Consult ID Dr. Denton in the AM

## 2024-12-11 NOTE — H&P ADULT - PROBLEM SELECTOR PLAN 7
Hx of HLD on Atorvastatin 10mg QD  - Start Atorvastatin 80mg QD Hx of HTN on Carvedilol 6.25mg BID, Lasix 40mg QD   - c/w home med  with parameters.  - DASH diet

## 2024-12-11 NOTE — H&P ADULT - PROBLEM SELECTOR PLAN 6
Hx of HTN on Carvedilol 6.25mg BID, Lasix 40mg QD   - c/w home med  with parameters.  - DASH diet Hx of  HFpEF (EF 55-60%), severe RV  Currently on Carvedilo 6.25mg BID, Lasix 40mg QD   - c/w home med

## 2024-12-11 NOTE — ED PROVIDER NOTE - PROGRESS NOTE DETAILS
none
Fortino-: PCR positive for influenza A.  Patient saturating well on 3 L nasal cannula, no respiratory distress.  Discussed with Dr. Bianchi and MAR re: admission.

## 2024-12-11 NOTE — ED PROVIDER NOTE - CLINICAL SUMMARY MEDICAL DECISION MAKING FREE TEXT BOX
Sohail: 89-year-old female with past medical history atrial fibrillation (on Eliquis) COPD (home oxygen 2 L, noncompliant with CPAP, multiple hospitalizations for COPD exacerbation), heart failure with preserved ejection fraction (EF 55 to 60%), pulmonary hypertension, hyperlipidemia presents with difficulty breathing.  Patient reports shortness of breath today.  Denies any chest pain, abdominal pain, vomiting, dysuria, or rash.  Physical exam per above. Patient febrile with wheezing bilaterally, likely infectious, ?COPD exacerbation. Will obtain labs, imaging, provide supportive treatment/empiric abx, anticipate admission.

## 2024-12-11 NOTE — H&P ADULT - PROBLEM SELECTOR PLAN 1
Presents with 1.5 weeks history  dry cough, SOB at rest and on exertion that has progressively worsened.   Patient denies any chest pain, palpitations  v/s: 120/69, 81, 100.7, 98% NRBM, repeat 100% on 2 LNC   Labs: WBC 7k, Trop 61.2, ProBNP 3314, VBG 7.40/52/42/32  UA: negative  Influenza +  EK BPM, Afib   CXR: negative for pneumonia   TTE (): HFpEF (EF 55-60%), severe RV, pulmonary HTN  s/p CTX, Azithromycin, Tylenol, Duoneb   - Admit to telemetry   - Vitals Q4hrs  - Aspirin, Atorvastatin 80mg QD  - Trend Troponin Presents with 1.5 weeks history  dry cough, SOB at rest and on exertion that has progressively worsened.   Patient denies any chest pain, palpitations  v/s: 120/69, 81, 100.7, 98% NRBM, repeat 100% on 2 LNC   Labs: WBC 7k, Trop 61.2, ProBNP 3314, VBG 7.40/52/42/32  UA: positive   Influenza +  EK BPM, Afib   CXR: negative for pneumonia   TTE (): HFpEF (EF 55-60%), severe RV, pulmonary HTN  s/p CTX, Azithromycin, Tylenol, Duoneb   - Admit to telemetry   - Vitals Q4hrs  - Aspirin, Atorvastatin 80mg QD  - Trend Troponin   - Consult Dr. Siu in the AM

## 2024-12-11 NOTE — H&P ADULT - PROBLEM SELECTOR PLAN 5
Hx of  HFpEF (EF 55-60%), severe RV  Currently on Carvedilo 6.25mg BID, Lasix 40mg QD   - c/w home med Hx of Afib on Eliquis  5mg BID   - c/w home med

## 2024-12-11 NOTE — ED PROVIDER NOTE - AXIS
[facility] Introcan Safety IV 20Ga 1.75 inch catheter inserted via ultrasound in patient's RFA. Brisk blood return noted and catheter flushes with ease. Patient tolerated well. Tavia Dawson, primary RN notified. Consult IV/PICC team if patient's needs change.
Left Deviation

## 2024-12-11 NOTE — H&P ADULT - HISTORY OF PRESENT ILLNESS
89 yo F, w/ morbid obesity,  ambulates with walker, with PMH of HTN, HLD, COPD (never smoker, on 2L O2 at home), BIPAP at night , HFpEF (EF 55-60%), severe RV, Afib on Eliquis, Pulmonary HTN, presents with 1.5 weeks history  dry cough, SOB at rest and on exertion that has progressively worsened. Today, patient developed fever, chills, body aches.   Patient reports that her cough has not improved and it has progressively worsened. Patient endorses sick contact with daughter who might currently have the flu. Patient did not receive the flu vaccine this season. Patient denies any chest pain, abdominal pain, nausea, vomiting, dysuria, urinary frequency, rash,       In the ED,   v/s: 120/69, 81, 100.7, 98% NRBM, repeat 100% on 2 LNC   Labs: WBC 7k, Trop 61.2, PrOBNP 3314, VBG 7.40/52/42/32  UA: negative  Influenza +  EK BPM, Afib   CXR: negative for pneumonia   s/p CTX, Azithromycin, Tylenol, Duoneb

## 2024-12-12 DIAGNOSIS — Z75.8 OTHER PROBLEMS RELATED TO MEDICAL FACILITIES AND OTHER HEALTH CARE: ICD-10-CM

## 2024-12-12 LAB
ALBUMIN SERPL ELPH-MCNC: 3.1 G/DL — LOW (ref 3.5–5)
ALP SERPL-CCNC: 139 U/L — HIGH (ref 40–120)
ALT FLD-CCNC: 17 U/L DA — SIGNIFICANT CHANGE UP (ref 10–60)
ANION GAP SERPL CALC-SCNC: 4 MMOL/L — LOW (ref 5–17)
AST SERPL-CCNC: 25 U/L — SIGNIFICANT CHANGE UP (ref 10–40)
BILIRUB SERPL-MCNC: 0.6 MG/DL — SIGNIFICANT CHANGE UP (ref 0.2–1.2)
BUN SERPL-MCNC: 16 MG/DL — SIGNIFICANT CHANGE UP (ref 7–18)
CALCIUM SERPL-MCNC: 8.6 MG/DL — SIGNIFICANT CHANGE UP (ref 8.4–10.5)
CHLORIDE SERPL-SCNC: 101 MMOL/L — SIGNIFICANT CHANGE UP (ref 96–108)
CO2 SERPL-SCNC: 30 MMOL/L — SIGNIFICANT CHANGE UP (ref 22–31)
CREAT SERPL-MCNC: 0.74 MG/DL — SIGNIFICANT CHANGE UP (ref 0.5–1.3)
EGFR: 75 ML/MIN/1.73M2 — SIGNIFICANT CHANGE UP
GLUCOSE SERPL-MCNC: 210 MG/DL — HIGH (ref 70–99)
HCT VFR BLD CALC: 37.1 % — SIGNIFICANT CHANGE UP (ref 34.5–45)
HGB BLD-MCNC: 12.2 G/DL — SIGNIFICANT CHANGE UP (ref 11.5–15.5)
MAGNESIUM SERPL-MCNC: 1.9 MG/DL — SIGNIFICANT CHANGE UP (ref 1.6–2.6)
MCHC RBC-ENTMCNC: 30.3 PG — SIGNIFICANT CHANGE UP (ref 27–34)
MCHC RBC-ENTMCNC: 32.9 G/DL — SIGNIFICANT CHANGE UP (ref 32–36)
MCV RBC AUTO: 92.3 FL — SIGNIFICANT CHANGE UP (ref 80–100)
NRBC # BLD: 0 /100 WBCS — SIGNIFICANT CHANGE UP (ref 0–0)
PHOSPHATE SERPL-MCNC: 3.9 MG/DL — SIGNIFICANT CHANGE UP (ref 2.5–4.5)
PLATELET # BLD AUTO: 162 K/UL — SIGNIFICANT CHANGE UP (ref 150–400)
POTASSIUM SERPL-MCNC: 3.7 MMOL/L — SIGNIFICANT CHANGE UP (ref 3.5–5.3)
POTASSIUM SERPL-SCNC: 3.7 MMOL/L — SIGNIFICANT CHANGE UP (ref 3.5–5.3)
PROT SERPL-MCNC: 6.7 G/DL — SIGNIFICANT CHANGE UP (ref 6–8.3)
RBC # BLD: 4.02 M/UL — SIGNIFICANT CHANGE UP (ref 3.8–5.2)
RBC # FLD: 14.6 % — HIGH (ref 10.3–14.5)
SODIUM SERPL-SCNC: 135 MMOL/L — SIGNIFICANT CHANGE UP (ref 135–145)
TROPONIN I, HIGH SENSITIVITY RESULT: 41.9 NG/L — SIGNIFICANT CHANGE UP
WBC # BLD: 4.68 K/UL — SIGNIFICANT CHANGE UP (ref 3.8–10.5)
WBC # FLD AUTO: 4.68 K/UL — SIGNIFICANT CHANGE UP (ref 3.8–10.5)

## 2024-12-12 PROCEDURE — 71250 CT THORAX DX C-: CPT | Mod: 26

## 2024-12-12 RX ADMIN — ANASTROZOLE 1 MILLIGRAM(S): 1 TABLET, COATED ORAL at 12:41

## 2024-12-12 RX ADMIN — Medication 10 MILLIGRAM(S): at 22:04

## 2024-12-12 RX ADMIN — CARVEDILOL 6.25 MILLIGRAM(S): 25 TABLET, FILM COATED ORAL at 17:26

## 2024-12-12 RX ADMIN — Medication 75 MILLIGRAM(S): at 05:48

## 2024-12-12 RX ADMIN — FUROSEMIDE 40 MILLIGRAM(S): 40 TABLET ORAL at 05:48

## 2024-12-12 RX ADMIN — APIXABAN 5 MILLIGRAM(S): 2.5 TABLET, FILM COATED ORAL at 05:48

## 2024-12-12 RX ADMIN — FLUTICASONE PROPIONATE AND SALMETEROL XINAFOATE 1 DOSE(S): 45; 21 AEROSOL, METERED RESPIRATORY (INHALATION) at 12:44

## 2024-12-12 RX ADMIN — Medication 40 MILLIGRAM(S): at 14:02

## 2024-12-12 RX ADMIN — MONTELUKAST SODIUM 10 MILLIGRAM(S): 10 TABLET ORAL at 12:41

## 2024-12-12 RX ADMIN — Medication 2 PUFF(S): at 13:16

## 2024-12-12 RX ADMIN — Medication 40 MILLIGRAM(S): at 05:49

## 2024-12-12 RX ADMIN — Medication 40 MILLIGRAM(S): at 22:05

## 2024-12-12 RX ADMIN — FAMOTIDINE 20 MILLIGRAM(S): 20 TABLET, FILM COATED ORAL at 05:49

## 2024-12-12 RX ADMIN — APIXABAN 5 MILLIGRAM(S): 2.5 TABLET, FILM COATED ORAL at 17:26

## 2024-12-12 RX ADMIN — AZITHROMYCIN 255 MILLIGRAM(S): 250 TABLET, FILM COATED ORAL at 05:48

## 2024-12-12 RX ADMIN — FLUTICASONE PROPIONATE AND SALMETEROL XINAFOATE 1 DOSE(S): 45; 21 AEROSOL, METERED RESPIRATORY (INHALATION) at 22:05

## 2024-12-12 RX ADMIN — FAMOTIDINE 20 MILLIGRAM(S): 20 TABLET, FILM COATED ORAL at 17:27

## 2024-12-12 RX ADMIN — Medication 100 MILLIGRAM(S): at 05:48

## 2024-12-12 RX ADMIN — FLUTICASONE PROPIONATE AND SALMETEROL XINAFOATE 1 DOSE(S): 45; 21 AEROSOL, METERED RESPIRATORY (INHALATION) at 05:47

## 2024-12-12 RX ADMIN — Medication 75 MILLIGRAM(S): at 17:26

## 2024-12-12 RX ADMIN — CARVEDILOL 6.25 MILLIGRAM(S): 25 TABLET, FILM COATED ORAL at 05:48

## 2024-12-12 NOTE — PROGRESS NOTE ADULT - PROBLEM SELECTOR PLAN 3
- p/w fever, chills, body aches.  - febrile to 100.7  - Influenza + with sick contacts  - continue Tamiflu 75mg BID x 5 days

## 2024-12-12 NOTE — PROGRESS NOTE ADULT - SUBJECTIVE AND OBJECTIVE BOX
Patient was seen and examined  Patient is a 89y old  Female who presents with a chief complaint of Influenza, COPD  exacerbation (11 Dec 2024 13:17)      INTERVAL HPI/OVERNIGHT EVENTS:  T(C): 36.8 (12-12-24 @ 07:41), Max: 39.1 (12-11-24 @ 10:27)  HR: 77 (12-12-24 @ 07:41) (59 - 93)  BP: 136/90 (12-12-24 @ 07:41) (125/82 - 156/96)  RR: 18 (12-12-24 @ 08:45) (18 - 23)  SpO2: 100% (12-12-24 @ 08:45) (91% - 100%)  Wt(kg): --  I&O's Summary    11 Dec 2024 07:01  -  12 Dec 2024 07:00  --------------------------------------------------------  IN: 0 mL / OUT: 450 mL / NET: -450 mL        LABS:                        12.2   4.68  )-----------( 162      ( 12 Dec 2024 07:43 )             37.1     12-12    135  |  101  |  16  ----------------------------<  210[H]  3.7   |  30  |  x     Ca    8.6      12 Dec 2024 07:43  Mg     1.9     12-12    TPro  x   /  Alb  3.1[L]  /  TBili  x   /  DBili  x   /  AST  x   /  ALT  x   /  AlkPhos  x   12-12    PT/INR - ( 11 Dec 2024 09:50 )   PT: 21.6 sec;   INR: 1.87 ratio         PTT - ( 11 Dec 2024 09:50 )  PTT:38.6 sec  Urinalysis Basic - ( 12 Dec 2024 07:43 )    Color: x / Appearance: x / SG: x / pH: x  Gluc: 210 mg/dL / Ketone: x  / Bili: x / Urobili: x   Blood: x / Protein: x / Nitrite: x   Leuk Esterase: x / RBC: x / WBC x   Sq Epi: x / Non Sq Epi: x / Bacteria: x      CAPILLARY BLOOD GLUCOSE              Urinalysis Basic - ( 12 Dec 2024 07:43 )    Color: x / Appearance: x / SG: x / pH: x  Gluc: 210 mg/dL / Ketone: x  / Bili: x / Urobili: x   Blood: x / Protein: x / Nitrite: x   Leuk Esterase: x / RBC: x / WBC x   Sq Epi: x / Non Sq Epi: x / Bacteria: x        MEDICATIONS  (STANDING):  anastrozole 1 milliGRAM(s) Oral daily  apixaban 5 milliGRAM(s) Oral every 12 hours  azithromycin  IVPB 500 milliGRAM(s) IV Intermittent every 24 hours  carvedilol 6.25 milliGRAM(s) Oral every 12 hours  cefTRIAXone   IVPB 1000 milliGRAM(s) IV Intermittent every 24 hours  famotidine    Tablet 20 milliGRAM(s) Oral two times a day  fluticasone propionate/ salmeterol 100-50 MICROgram(s) Diskus 1 Dose(s) Inhalation two times a day  furosemide    Tablet 40 milliGRAM(s) Oral daily  methylPREDNISolone sodium succinate Injectable 40 milliGRAM(s) IV Push every 8 hours  montelukast 10 milliGRAM(s) Oral daily  oseltamivir 75 milliGRAM(s) Oral two times a day  tiotropium 2.5 MICROgram(s) Inhaler 2 Puff(s) Inhalation daily    MEDICATIONS  (PRN):  acetaminophen     Tablet .. 650 milliGRAM(s) Oral every 6 hours PRN Temp greater or equal to 38C (100.4F), Mild Pain (1 - 3)  albuterol    90 MICROgram(s) HFA Inhaler 2 Puff(s) Inhalation every 6 hours PRN for bronchospasm  ondansetron Injectable 4 milliGRAM(s) IV Push every 8 hours PRN Nausea and/or Vomiting      RADIOLOGY & ADDITIONAL TESTS:    Imaging Personally Reviewed:  [ ] YES  [ ] NO    REVIEW OF SYSTEMS:      Consultant(s) Notes Reviewed:  [ ] YES  [ ] NO    PHYSICAL EXAM:  GENERAL: NAD, well-groomed, well-developed  HEAD:  Atraumatic, Normocephalic  EYES: EOMI, PERRLA, conjunctiva and sclera clear  ENMT: No tonsillar erythema, exudates, or enlargement; Moist mucous membranes, Good dentition, No lesions  NECK: Supple, No JVD, Normal thyroid  NERVOUS SYSTEM awake no asymetry  CHEST/LUNG: bl rhonchi   HEART: Regular rate and rhythm; No murmurs, rubs, or gallops  ABDOMEN: Soft, Nontender, Nondistended; Bowel sounds present  EXTREMITIES:  2+ Peripheral Pulses, No clubbing, cyanosis, or edema  LYMPH: No lymphadenopathy noted  SKIN: No rashes or lesions    Care Discussed with Consultants/Other Providers [ x] YES  [ ] NO Patient was seen and examined  Patient is a 89y old  Female who presents with a chief complaint of Influenza, COPD  exacerbation (11 Dec 2024 13:17)      INTERVAL HPI/OVERNIGHT EVENTS:  T(C): 36.8 (12-12-24 @ 07:41), Max: 39.1 (12-11-24 @ 10:27)  HR: 77 (12-12-24 @ 07:41) (59 - 93)  BP: 136/90 (12-12-24 @ 07:41) (125/82 - 156/96)  RR: 18 (12-12-24 @ 08:45) (18 - 23)  SpO2: 100% (12-12-24 @ 08:45) (91% - 100%)  Wt(kg): --  I&O's Summary    11 Dec 2024 07:01  -  12 Dec 2024 07:00  --------------------------------------------------------  IN: 0 mL / OUT: 450 mL / NET: -450 mL        LABS:                        12.2   4.68  )-----------( 162      ( 12 Dec 2024 07:43 )             37.1     12-12    135  |  101  |  16  ----------------------------<  210[H]  3.7   |  30  |  x     Ca    8.6      12 Dec 2024 07:43  Mg     1.9     12-12    TPro  x   /  Alb  3.1[L]  /  TBili  x   /  DBili  x   /  AST  x   /  ALT  x   /  AlkPhos  x   12-12    PT/INR - ( 11 Dec 2024 09:50 )   PT: 21.6 sec;   INR: 1.87 ratio         PTT - ( 11 Dec 2024 09:50 )  PTT:38.6 sec  Urinalysis Basic - ( 12 Dec 2024 07:43 )    Color: x / Appearance: x / SG: x / pH: x  Gluc: 210 mg/dL / Ketone: x  / Bili: x / Urobili: x   Blood: x / Protein: x / Nitrite: x   Leuk Esterase: x / RBC: x / WBC x   Sq Epi: x / Non Sq Epi: x / Bacteria: x      CAPILLARY BLOOD GLUCOSE              Urinalysis Basic - ( 12 Dec 2024 07:43 )    Color: x / Appearance: x / SG: x / pH: x  Gluc: 210 mg/dL / Ketone: x  / Bili: x / Urobili: x   Blood: x / Protein: x / Nitrite: x   Leuk Esterase: x / RBC: x / WBC x   Sq Epi: x / Non Sq Epi: x / Bacteria: x        MEDICATIONS  (STANDING):  anastrozole 1 milliGRAM(s) Oral daily  apixaban 5 milliGRAM(s) Oral every 12 hours  azithromycin  IVPB 500 milliGRAM(s) IV Intermittent every 24 hours  carvedilol 6.25 milliGRAM(s) Oral every 12 hours  cefTRIAXone   IVPB 1000 milliGRAM(s) IV Intermittent every 24 hours  famotidine    Tablet 20 milliGRAM(s) Oral two times a day  fluticasone propionate/ salmeterol 100-50 MICROgram(s) Diskus 1 Dose(s) Inhalation two times a day  furosemide    Tablet 40 milliGRAM(s) Oral daily  methylPREDNISolone sodium succinate Injectable 40 milliGRAM(s) IV Push every 8 hours  montelukast 10 milliGRAM(s) Oral daily  oseltamivir 75 milliGRAM(s) Oral two times a day  tiotropium 2.5 MICROgram(s) Inhaler 2 Puff(s) Inhalation daily    MEDICATIONS  (PRN):  acetaminophen     Tablet .. 650 milliGRAM(s) Oral every 6 hours PRN Temp greater or equal to 38C (100.4F), Mild Pain (1 - 3)  albuterol    90 MICROgram(s) HFA Inhaler 2 Puff(s) Inhalation every 6 hours PRN for bronchospasm  ondansetron Injectable 4 milliGRAM(s) IV Push every 8 hours PRN Nausea and/or Vomiting      RADIOLOGY & ADDITIONAL TESTS:    Imaging Personally Reviewed:  [ ] YES  [ ] NO    REVIEW OF SYSTEMS:    no acute distress     Consultant(s) Notes Reviewed:  [ ] YES  [ ] NO    PHYSICAL EXAM:  GENERAL: NAD, well-groomed, well-developed  HEAD:  Atraumatic, Normocephalic  EYES: EOMI, PERRLA, conjunctiva and sclera clear  ENMT: No tonsillar erythema, exudates, or enlargement; Moist mucous membranes, Good dentition, No lesions  NECK: Supple, No JVD, Normal thyroid  NERVOUS SYSTEM awake no asymetry  CHEST/LUNG: bl rhonchi   HEART: Regular rate and rhythm; No murmurs, rubs, or gallops  ABDOMEN: Soft, Nontender, Nondistended; Bowel sounds present  EXTREMITIES:  2+ Peripheral Pulses, No clubbing, cyanosis, or edema  LYMPH: No lymphadenopathy noted  SKIN: No rashes or lesions    Care Discussed with Consultants/Other Providers [ x] YES  [ ] NO

## 2024-12-12 NOTE — CONSULT NOTE ADULT - SUBJECTIVE AND OBJECTIVE BOX
Date of Service  24 @ 09:57    CHIEF COMPLAINT:Patient is a 89y old  Female who presents with a chief complaint of Influenza, COPD  exacerbation.      HPI:  89 yo F, w/ morbid obesity,  ambulates with walker, with PMH of HTN, HLD, COPD (never smoker, on 2L O2 at home), BIPAP at night , HFpEF (EF 55-60%), Breast Ca, Chronic Afib on Eliquis, Pulmonary HTN, presents with 1.5 weeks history  dry cough, SOB at rest and on exertion that has progressively worsened. Today, patient developed fever, chills, body aches.   Patient reports that her cough has not improved and it has progressively worsened. Patient endorses sick contact with daughter who might currently have the flu. Patient did not receive the flu vaccine this season. Patient denies any chest pain, abdominal pain, nausea, vomiting, dysuria, urinary frequency, rash,       In the ED,   v/s: 120/69, 81, 100.7, 98% NRBM, repeat 100% on 2 LNC   Labs: WBC 7k, Trop 61.2, PrOBNP 3314, VBG 7.40/52/42/32  UA: negative  Influenza +  EK BPM, Afib   CXR: negative for pneumonia   s/p CTX, Azithromycin, Tylenol, Duoneb   Asked to see patient for mildly elevated troponins.      PAST MEDICAL & SURGICAL HISTORY:  Arthritis      Legally blind      Pre-diabetes      Breast cancer  right, no chemo or radiation      COPD exacerbation      Atrial fibrillation      HF (heart failure)  HFpEF       HTN (hypertension)      Deep vein thrombosis (DVT)  Left Lower Extremity      H/O CHF      HTN (hypertension)      HLD (hyperlipidemia)      COPD exacerbation      CHF, acute          MEDICATIONS  (STANDING):  anastrozole 1 milliGRAM(s) Oral daily  apixaban 5 milliGRAM(s) Oral every 12 hours  azithromycin  IVPB 500 milliGRAM(s) IV Intermittent every 24 hours  carvedilol 6.25 milliGRAM(s) Oral every 12 hours  cefTRIAXone   IVPB 1000 milliGRAM(s) IV Intermittent every 24 hours  famotidine    Tablet 20 milliGRAM(s) Oral two times a day  fluticasone propionate/ salmeterol 100-50 MICROgram(s) Diskus 1 Dose(s) Inhalation two times a day  furosemide    Tablet 40 milliGRAM(s) Oral daily  methylPREDNISolone sodium succinate Injectable 40 milliGRAM(s) IV Push every 8 hours  montelukast 10 milliGRAM(s) Oral daily  oseltamivir 75 milliGRAM(s) Oral two times a day  tiotropium 2.5 MICROgram(s) Inhaler 2 Puff(s) Inhalation daily    MEDICATIONS  (PRN):  acetaminophen     Tablet .. 650 milliGRAM(s) Oral every 6 hours PRN Temp greater or equal to 38C (100.4F), Mild Pain (1 - 3)  albuterol    90 MICROgram(s) HFA Inhaler 2 Puff(s) Inhalation every 6 hours PRN for bronchospasm  ondansetron Injectable 4 milliGRAM(s) IV Push every 8 hours PRN Nausea and/or Vomiting      FAMILY HISTORY:  FH: HTN (hypertension)        SOCIAL HISTORY:    [x ] Non-smoker    [ x] Alcohol-denies    Allergies    losartan (Angioedema)    Intolerances    Chicken (Stomach Upset)  	    REVIEW OF SYSTEMS:  CONSTITUTIONAL: No fever, weight loss, or fatigue  EYES: No eye pain, visual disturbances, or discharge  ENT:  No difficulty hearing, tinnitus, vertigo; No sinus or throat pain  NECK: No pain or stiffness  RESPIRATORY: + cough, No wheezing, chills or hemoptysis; + Shortness of Breath  CARDIOVASCULAR: No chest pain, palpitations, passing out, dizziness, or leg swelling  GASTROINTESTINAL: No abdominal or epigastric pain. No nausea, vomiting, or hematemesis; No diarrhea or constipation. No melena or hematochezia.  GENITOURINARY: No dysuria, frequency, hematuria, or incontinence  NEUROLOGICAL: No headaches, memory loss, loss of strength, numbness, or tremors  SKIN: No itching, burning, rashes, or lesions   LYMPH Nodes: No enlarged glands  ENDOCRINE: No heat or cold intolerance; No hair loss  MUSCULOSKELETAL: No joint pain or swelling; No muscle, back, or extremity pain  PSYCHIATRIC: No depression, anxiety, mood swings, or difficulty sleeping  HEME/LYMPH: No easy bruising, or bleeding gums  ALLERGY AND IMMUNOLOGIC: No hives or eczema	      PHYSICAL EXAM:  T(C): 36.8 (24 @ 07:41), Max: 39.1 (24 @ 10:27)  HR: 78 (24 @ 08:32) (59 - 93)  BP: 136/90 (24 @ 07:41) (125/82 - 156/96)  RR: 18 (24 @ 08:45) (18 - 19)  SpO2: 100% (24 @ 08:45) (91% - 100%)  Wt(kg): --  I&O's Summary    11 Dec 2024 07:01  -  12 Dec 2024 07:00  --------------------------------------------------------  IN: 0 mL / OUT: 450 mL / NET: -450 mL        Appearance: Normal	  HEENT:   Normal oral mucosa, PERRL, EOMI	  Lymphatic: No lymphadenopathy  Cardiovascular: Normal S1 S2, No JVD, No murmurs, No edema  Respiratory: B/L ronchi  Psychiatry: A & O x 3, Mood & affect appropriate  Gastrointestinal:  Soft, Non-tender, + BS	  Skin: No rashes, No ecchymoses, No cyanosis	  Neurologic: Non-focal  Extremities: Normal range of motion, No clubbing, cyanosis or edema  Vascular: Peripheral pulses palpable 2+ bilaterally     	    ECG:  < from: 12 Lead ECG (24 @ 20:05) >  Atrial fibrillation  Possible Anterior infarct , age undetermined    < end of copied text >  	  	  	  LABS:	 	    CARDIAC MARKERS:      Troponin I, High Sensitivity Result: 41.9 ng/L (24 @ 07:43)  Troponin I, High Sensitivity Result: 72.1 ng/L (24 @ 16:10)  Troponin I, High Sensitivity Result: 61.2 ng/L (24 @ 09:50)                          12.2   4.68  )-----------( 162      ( 12 Dec 2024 07:43 )             37.1         135  |  101  |  16  ----------------------------<  210[H]  3.7   |  30  |  0.74    Ca    8.6      12 Dec 2024 07:43  Phos  3.9       Mg     1.9         TPro  6.7  /  Alb  3.1[L]  /  TBili  0.6  /  DBili  x   /  AST  25  /  ALT  17  /  AlkPhos  139[H]  12-12    < from: Xray Chest 1 View- PORTABLE-Urgent (24 @ 11:37) >  ACC: [id number] EXAM:  XR CHEST PORTABLE URGENT 1V   ORDERED BY: SORAYA ROWAN     PROCEDURE DATE:  2024          INTERPRETATION:  CLINICAL INDICATION: 89 years  Female with Sepsis.    COMPARISON: 2020    Rotated apical lordotic view of the chest demonstrates the lungs to be   clear. There is no pleural effusion. The right diaphragm is again   elevated. The pulmonary vasculature is normal. There is no pneumothorax.    The heart is enlarged. The mediastinum and elpidio cannot be assessed due to   rotation.    Mild thoracic degenerative changes and osteopenia are present.    IMPRESSION:    No acute infiltrate. Cardiomegaly.    --- End of Report ---            LOGAN VAZQUEZ MD; Attending Radiologist  This document has been electronically signed. Dec 11 2024 12:20PM    < end of copied text >  < from: TTE W or WO Ultrasound Enhancing Agent (24 @ 10:54) >  CONCLUSIONS:      1. Technically difficult image quality.   2. Left atrium is severely dilated.   3. The right atrium is moderately dilated.   4. Left ventricular systolic function is normal with an ejection fraction of 57 % by Smith's method of disks.   5. There is increased LV mass and concentric hypertrophy.   6. Enlarged right ventricular cavity size and reduced right ventricular systolic function. Tricuspid annular plane systolic excursion (TAPSE) is 1.4 cm (normal >=1.7 cm).   7. Estimated pulmonary artery systolic pressure is 69 mmHg, consistent with severe pulmonary hypertension.    < end of copied text >

## 2024-12-12 NOTE — PROGRESS NOTE ADULT - PROBLEM SELECTOR PLAN 1
Presents with 1.5 weeks history  dry cough, SOB at rest and on exertion that has progressively worsened.   Patient denies any chest pain, palpitations  v/s: 120/69, 81, 100.7, 98% NRBM, repeat 100% on 2 LNC   Labs: WBC 7k, Trop 61.2, ProBNP 3314, VBG 7.40/52/42/32  UA: positive   Influenza +  EK BPM, Afib   CXR: negative for pneumonia   TTE (): HFpEF (EF 55-60%), severe RV, pulmonary HTN  s/p CTX, Azithromycin, Tylenol, Duoneb   - Admit to telemetry   - Vitals Q4hrs  - Aspirin, Atorvastatin 80mg QD  - Trend Troponin   - Consult Dr. Siu in the AM

## 2024-12-12 NOTE — PROGRESS NOTE ADULT - PROBLEM SELECTOR PLAN 1
- p/w 1.5 weeks history of dry cough, SOB at rest and on exertion that has progressively worsened.   - Trop 61.2 > 72.1 > 41.9, likely demand ischemia from flu/infection  - ProBNP 3314  - EK BPM, Afib   - CXR/CT chest: negative for pneumonia   - TTE (): HFpEF (EF 57%), severe pulmonary HTN, reduced RV systolic function  - Admit to telemetry   - LDL 61  - Dr. Siu following

## 2024-12-12 NOTE — PROGRESS NOTE ADULT - PROBLEM SELECTOR PLAN 6
- Hx of HFpEF (EF 55-60%), severe pulm HTN  - Currently on Carvedilol 6.25mg BID, Lasix 40mg QD   - c/w home meds

## 2024-12-12 NOTE — PROGRESS NOTE ADULT - PROBLEM SELECTOR PLAN 4
Patient febrile today  Patient denies dysuria, urinary frequency.urgency  UA positive  - Started on Ceftriaxone 1g   - f/u Ucx  - Consult ID Dr. Denton in the AM

## 2024-12-12 NOTE — PROGRESS NOTE ADULT - ASSESSMENT
89 yo F, w/ morbid obesity,  ambulates with walker, with PMH of HTN, HLD, COPD (never smoker, on 2L O2 at home), BIPAP at night , HFpEF (EF 55-60%), severe RV, pulmonary HTN,  Afib on Eliquis, Pulmonary HTN, presents with 1.5 weeks history  dry cough, SOB at rest and on exertion that has progressively worsened. Today, patient developed fever, chills, body aches. Admitted to telemetry for NSTEMI, COPD exacerbation, UTI  and influenza

## 2024-12-12 NOTE — PROGRESS NOTE ADULT - SUBJECTIVE AND OBJECTIVE BOX
NP Note discussed with  Primary Attending    Patient is a 89y old  Female who presents with a chief complaint of Influenza, COPD  exacerbation (12 Dec 2024 10:51)      INTERVAL HPI/OVERNIGHT EVENTS:  89 yo F, w/ morbid obesity, ambulates with walker, with PMHx of HTN, HLD, COPD (never smoker, on 2L O2 at home), BIPAP at night , HFpEF (EF 55-60%), severe RV, Afib on Eliquis, Pulmonary HTN, presents with 1.5 weeks history of dry cough, SOB at rest and on exertion that has progressively worsened. Patient developed fever, chills, body aches. Patient endorses sick contact with daughter who might currently have the flu. Patient did not receive the flu vaccine this season. Admitted to telemetry for NSTEMI, COPD exacerbation found to have influenza    CXR showed No acute infiltrate. Cardiomegaly. CT chest showed No pneumonia. 5 mm right upper lobe nodule appears new as described above. 6-12 month follow-up noncontrast chest CT can be performed for further evaluation as clinically warranted.  Pulmonology following, started on IV abx and steroid IV. Cardiology following, with mild elevation on troponin    Pt is awake, alert, oriented x 3, no SOB, reported feeling better    MEDICATIONS  (STANDING):  anastrozole 1 milliGRAM(s) Oral daily  apixaban 5 milliGRAM(s) Oral every 12 hours  azithromycin  IVPB 500 milliGRAM(s) IV Intermittent every 24 hours  carvedilol 6.25 milliGRAM(s) Oral every 12 hours  cefTRIAXone   IVPB 1000 milliGRAM(s) IV Intermittent every 24 hours  famotidine    Tablet 20 milliGRAM(s) Oral two times a day  fluticasone propionate/ salmeterol 100-50 MICROgram(s) Diskus 1 Dose(s) Inhalation two times a day  furosemide    Tablet 40 milliGRAM(s) Oral daily  methylPREDNISolone sodium succinate Injectable 40 milliGRAM(s) IV Push every 8 hours  montelukast 10 milliGRAM(s) Oral daily  oseltamivir 75 milliGRAM(s) Oral two times a day  tiotropium 2.5 MICROgram(s) Inhaler 2 Puff(s) Inhalation daily    MEDICATIONS  (PRN):  acetaminophen     Tablet .. 650 milliGRAM(s) Oral every 6 hours PRN Temp greater or equal to 38C (100.4F), Mild Pain (1 - 3)  albuterol    90 MICROgram(s) HFA Inhaler 2 Puff(s) Inhalation every 6 hours PRN for bronchospasm  ondansetron Injectable 4 milliGRAM(s) IV Push every 8 hours PRN Nausea and/or Vomiting      __________________________________________________  REVIEW OF SYSTEMS:    CONSTITUTIONAL: No fever,   EYES: no acute visual disturbances  NECK: No pain or stiffness  RESPIRATORY: + cough; No shortness of breath  CARDIOVASCULAR: No chest pain, no palpitations  GASTROINTESTINAL: No pain. No nausea or vomiting; No diarrhea   NEUROLOGICAL: No headache or numbness, no tremors  MUSCULOSKELETAL: No joint pain, no muscle pain  GENITOURINARY: no dysuria, no frequency, no hesitancy  ALL OTHER  ROS negative        Vital Signs Last 24 Hrs  T(C): 36.7 (12 Dec 2024 11:20), Max: 37.1 (11 Dec 2024 16:00)  T(F): 98 (12 Dec 2024 11:20), Max: 98.7 (11 Dec 2024 16:00)  HR: 80 (12 Dec 2024 11:20) (59 - 93)  BP: 155/92 (12 Dec 2024 11:20) (131/91 - 156/96)  BP(mean): --  RR: 18 (12 Dec 2024 11:20) (18 - 19)  SpO2: 99% (12 Dec 2024 11:20) (91% - 100%)    Parameters below as of 12 Dec 2024 11:20  Patient On (Oxygen Delivery Method): room air        ________________________________________________  PHYSICAL EXAM:  GENERAL: NAD  HEENT: Normocephalic; moist mucous membranes;   NECK : supple  CHEST/LUNG: Diminished   HEART: S1 S2  regular;  ABDOMEN: Soft, Nontender, Nondistended; Bowel sounds present  EXTREMITIES: no cyanosis; no edema; no calf tenderness  SKIN: warm and dry; no rash  NERVOUS SYSTEM:  Awake and alert; Oriented to place, person and time    _________________________________________________  LABS:                        12.2   4.68  )-----------( 162      ( 12 Dec 2024 07:43 )             37.1     12-12    135  |  101  |  16  ----------------------------<  210[H]  3.7   |  30  |  0.74    Ca    8.6      12 Dec 2024 07:43  Phos  3.9     12-12  Mg     1.9     12-12    TPro  6.7  /  Alb  3.1[L]  /  TBili  0.6  /  DBili  x   /  AST  25  /  ALT  17  /  AlkPhos  139[H]  12-12    PT/INR - ( 11 Dec 2024 09:50 )   PT: 21.6 sec;   INR: 1.87 ratio         PTT - ( 11 Dec 2024 09:50 )  PTT:38.6 sec  Urinalysis Basic - ( 12 Dec 2024 07:43 )    Color: x / Appearance: x / SG: x / pH: x  Gluc: 210 mg/dL / Ketone: x  / Bili: x / Urobili: x   Blood: x / Protein: x / Nitrite: x   Leuk Esterase: x / RBC: x / WBC x   Sq Epi: x / Non Sq Epi: x / Bacteria: x      CAPILLARY BLOOD GLUCOSE            RADIOLOGY & ADDITIONAL TESTS:  < from: CT Chest No Cont (12.12.24 @ 13:14) >    ACC: [id number] EXAM:  CT CHEST   ORDERED BY: RAMBO WILLIAMSON     PROCEDURE DATE:  12/12/2024          INTERPRETATION:  INDICATION: Pneumonia    Axial CT images of the chest are obtained without intravenous   administration of contrast.    COMPARISON: Chest CT of January 31, 2023    Thyroid gland is heterogeneous with asymmetric enlargement of the left   lobe as on the prior study.    LYMPH NODES: No lymphadenopathy.    VASCULATURE: Cardiomegaly with biatrial dilation. Aortic root and valve   calcifications. Aortic intimal calcifications. No pleural or pericardial   effusions.    UPPER ABDOMEN: Colonic diverticulosis. Right renal cyst.    LUNGS: Respiratory motion artifact limits evaluation of the lung   parenchyma. 5 mm right upper lobe nodule appears new since the chest CT   of July 28, 2022. Accurate comparison with the use 30/1/2023 is limited   due to superimposed fair amount of respiratory motion artifact on the   prior study. No pneumonia.    Biapical scarring. Slightly asymmetricleft upper lobe apical opacity,   part of which measures about 1 cm on image 29 of series 3 is without   significant interval change since the prior study also likely related to   the apical scarring.    CHEST WALL: Bony degenerative changes with involvement of the spine and   the shoulders.    IMPRESSION:    No pneumonia.    5 mm right upper lobe nodule appears new as described above. 6-12 month   follow-up noncontrast chest CT can be performed for further evaluation as   clinically warranted.    --- End of Report ---            VEENA MENESES MD; Attending Radiologist  This document has been electronically signed. Dec 12 2024  2:28PM    < end of copied text >    Imaging Personally Reviewed:  YES/NO    Consultant(s) Notes Reviewed:   YES/ No    Care Discussed with Consultants :     Plan of care was discussed with patient and /or primary care giver; all questions and concerns were addressed and care was aligned with patient's wishes.

## 2024-12-12 NOTE — PROGRESS NOTE ADULT - PROBLEM SELECTOR PLAN 6
Hx of  HFpEF (EF 55-60%), severe RV  Currently on Carvedilo 6.25mg BID, Lasix 40mg QD   - c/w home med

## 2024-12-12 NOTE — PROGRESS NOTE ADULT - PROBLEM SELECTOR PLAN 3
Today, patient developed fever, chills, body aches.  v/s: 120/69, 81, 100.7, 98% NRBM, repeat 100% on 2 LNC   Influenza +   - Started Tamiflu 75mg BID   - Oxygen supplement as needed  - Isolation

## 2024-12-12 NOTE — ED ADULT TRIAGE NOTE - BP NONINVASIVE DIASTOLIC (MM HG)
Sedation Plan    ASA 2     Mallampati class: II.    Risks, benefits, and alternatives discussed with patient.      
89
81

## 2024-12-12 NOTE — PROGRESS NOTE ADULT - PROBLEM SELECTOR PLAN 4
- Patient denies dysuria, urinary frequency, urgency  - UA positive, F/U Ucx  - F/U bcx  - continue Ceftriaxone 1g

## 2024-12-12 NOTE — CONSULT NOTE ADULT - ASSESSMENT
89 yo F, w/ morbid obesity,  ambulates with walker, with PMH of HTN, HLD, COPD (never smoker, on 2L O2 at home), BIPAP at night , HFpEF (EF 55-60%), Breast ca, Chronic Afib on Eliquis, Pulmonary HTN, presents with 1.5 weeks history  dry cough, SOB at rest and on exertion that has progressively worsened,pneumonia,Flu with borderline troponins.  1.Tele monitoring.  2.Flu-abx.CT chest w/o contrast.  3.Chronic afib-eliquis,coreg.  4.COPD-MDI,steroids.  5.Breast ca-anastrozole.  6.Rt sided CHF-lasix.  7.Bipap at night.  8.PPI.

## 2024-12-12 NOTE — CONSULT NOTE ADULT - SUBJECTIVE AND OBJECTIVE BOX
PULMONARY CONSULT NOTE      NUNO LAST  MRN-[id number]    History of Present Illness:  Reason for Admission: Influenza, COPD  exacerbation  History of Present Illness:   89 yo F, w/ morbid obesity,  ambulates with walker, with PMH of HTN, HLD, COPD (never smoker, on 2L O2 at home), BIPAP at night , HFpEF (EF 55-60%), severe RV, Afib on Eliquis, Pulmonary HTN, presents with 1.5 weeks history  dry cough, SOB at rest and on exertion that has progressively worsened. Today, patient developed fever, chills, body aches.   Patient reports that her cough has not improved and it has progressively worsened. Patient endorses sick contact with daughter who might currently have the flu. Patient did not receive the flu vaccine this season. Patient denies any chest pain, abdominal pain, nausea, vomiting, dysuria, urinary frequency, rash,         HISTORY OF PRESENT ILLNESS: As Above. Awake, alert, comfortable in bed in NAD    MEDICATIONS  (STANDING):  anastrozole 1 milliGRAM(s) Oral daily  apixaban 5 milliGRAM(s) Oral every 12 hours  azithromycin  IVPB 500 milliGRAM(s) IV Intermittent every 24 hours  carvedilol 6.25 milliGRAM(s) Oral every 12 hours  cefTRIAXone   IVPB 1000 milliGRAM(s) IV Intermittent every 24 hours  famotidine    Tablet 20 milliGRAM(s) Oral two times a day  fluticasone propionate/ salmeterol 100-50 MICROgram(s) Diskus 1 Dose(s) Inhalation two times a day  furosemide    Tablet 40 milliGRAM(s) Oral daily  methylPREDNISolone sodium succinate Injectable 40 milliGRAM(s) IV Push every 8 hours  montelukast 10 milliGRAM(s) Oral daily  oseltamivir 75 milliGRAM(s) Oral two times a day  tiotropium 2.5 MICROgram(s) Inhaler 2 Puff(s) Inhalation daily      MEDICATIONS  (PRN):  acetaminophen     Tablet .. 650 milliGRAM(s) Oral every 6 hours PRN Temp greater or equal to 38C (100.4F), Mild Pain (1 - 3)  albuterol    90 MICROgram(s) HFA Inhaler 2 Puff(s) Inhalation every 6 hours PRN for bronchospasm  ondansetron Injectable 4 milliGRAM(s) IV Push every 8 hours PRN Nausea and/or Vomiting      Allergies    losartan (Angioedema)    Intolerances    Chicken (Stomach Upset)      PAST MEDICAL & SURGICAL HISTORY:  Arthritis      Legally blind      Pre-diabetes      Breast cancer  right, no chemo or radiation      COPD exacerbation      Atrial fibrillation      HF (heart failure)  HFpEF 7/29      HTN (hypertension)      Deep vein thrombosis (DVT)  Left Lower Extremity      H/O CHF      HTN (hypertension)      HLD (hyperlipidemia)      COPD exacerbation      CHF, acute      No significant past surgical history          FAMILY HISTORY:  FH: HTN (hypertension)        SOCIAL HISTORY  Smoking History:     REVIEW OF SYSTEMS:    CONSTITUTIONAL:  No fevers, chills, sweats    HEENT:  Eyes:  No diplopia or blurred vision. ENT:  No earache, sore throat or runny nose.    CARDIOVASCULAR:  No pressure, squeezing, tightness, or heaviness about the chest; no palpitations.    RESPIRATORY:  Per HPI    GASTROINTESTINAL:  No abdominal pain, nausea, vomiting or diarrhea.    GENITOURINARY:  No dysuria, frequency or urgency.    NEUROLOGIC:  No paresthesias, fasciculations, seizures or weakness.    PSYCHIATRIC:  No disorder of thought or mood.    Vital Signs Last 24 Hrs  T(C): 36.8 (12 Dec 2024 07:41), Max: 37.1 (11 Dec 2024 12:00)  T(F): 98.2 (12 Dec 2024 07:41), Max: 98.8 (11 Dec 2024 12:00)  HR: 78 (12 Dec 2024 08:32) (59 - 93)  BP: 136/90 (12 Dec 2024 07:41) (125/82 - 156/96)  BP(mean): --  RR: 18 (12 Dec 2024 08:45) (18 - 19)  SpO2: 100% (12 Dec 2024 08:45) (91% - 100%)    Parameters below as of 12 Dec 2024 08:45  Patient On (Oxygen Delivery Method): room air      I&O's Detail    11 Dec 2024 07:01  -  12 Dec 2024 07:00  --------------------------------------------------------  IN:  Total IN: 0 mL    OUT:    Voided (mL): 450 mL  Total OUT: 450 mL    Total NET: -450 mL          PHYSICAL EXAMINATION:    GENERAL: The patient is a well-developed, well-nourished _____in no apparent distress.     HEENT: Head is normocephalic and atraumatic. Extraocular muscles are intact. Mucous membranes are moist.     NECK: Supple.     LUNGS: Clear to auscultation without wheezing, rales, or rhonchi. Respirations unlabored    HEART: Regular rate and rhythm without murmur.    ABDOMEN: Soft, nontender, and nondistended.  No hepatosplenomegaly is noted.    EXTREMITIES: Without any cyanosis, clubbing, rash, lesions or edema.    NEUROLOGIC: Grossly intact.      LABS:                        12.2   4.68  )-----------( 162      ( 12 Dec 2024 07:43 )             37.1     12-12    135  |  101  |  16  ----------------------------<  210[H]  3.7   |  30  |  0.74    Ca    8.6      12 Dec 2024 07:43  Phos  3.9     12-12  Mg     1.9     12-12    TPro  6.7  /  Alb  3.1[L]  /  TBili  0.6  /  DBili  x   /  AST  25  /  ALT  17  /  AlkPhos  139[H]  12-12    PT/INR - ( 11 Dec 2024 09:50 )   PT: 21.6 sec;   INR: 1.87 ratio         PTT - ( 11 Dec 2024 09:50 )  PTT:38.6 sec  Urinalysis Basic - ( 12 Dec 2024 07:43 )    Color: x / Appearance: x / SG: x / pH: x  Gluc: 210 mg/dL / Ketone: x  / Bili: x / Urobili: x   Blood: x / Protein: x / Nitrite: x   Leuk Esterase: x / RBC: x / WBC x   Sq Epi: x / Non Sq Epi: x / Bacteria: x                      MICROBIOLOGY:    RADIOLOGY & ADDITIONAL STUDIES:    CXR:  < from: Xray Chest 1 View- PORTABLE-Urgent (12.11.24 @ 11:37) >  IMPRESSION:    No acute infiltrate. Cardiomegaly.      < end of copied text >    Ct scan chest;    ekg;    echo: PULMONARY CONSULT NOTE      NUNO LAST  MRN-[id number]    History of Present Illness:  Reason for Admission: Influenza, COPD  exacerbation  History of Present Illness:   89 yo F, w/ morbid obesity,  ambulates with walker, with PMH of HTN, HLD, COPD (never smoker, on 2L O2 at home), BIPAP at night , HFpEF (EF 55-60%), severe RV, Afib on Eliquis, Pulmonary HTN, presents with 1.5 weeks history  dry cough, SOB at rest and on exertion that has progressively worsened. Today, patient developed fever, chills, body aches.   Patient reports that her cough has not improved and it has progressively worsened. Patient endorses sick contact with daughter who might currently have the flu. Patient did not receive the flu vaccine this season. Patient denies any chest pain, abdominal pain, nausea, vomiting, dysuria, urinary frequency, rash,         HISTORY OF PRESENT ILLNESS: As Above. Awake, alert, comfortable in bed in NAD    MEDICATIONS  (STANDING):  anastrozole 1 milliGRAM(s) Oral daily  apixaban 5 milliGRAM(s) Oral every 12 hours  azithromycin  IVPB 500 milliGRAM(s) IV Intermittent every 24 hours  carvedilol 6.25 milliGRAM(s) Oral every 12 hours  cefTRIAXone   IVPB 1000 milliGRAM(s) IV Intermittent every 24 hours  famotidine    Tablet 20 milliGRAM(s) Oral two times a day  fluticasone propionate/ salmeterol 100-50 MICROgram(s) Diskus 1 Dose(s) Inhalation two times a day  furosemide    Tablet 40 milliGRAM(s) Oral daily  methylPREDNISolone sodium succinate Injectable 40 milliGRAM(s) IV Push every 8 hours  montelukast 10 milliGRAM(s) Oral daily  oseltamivir 75 milliGRAM(s) Oral two times a day  tiotropium 2.5 MICROgram(s) Inhaler 2 Puff(s) Inhalation daily      MEDICATIONS  (PRN):  acetaminophen     Tablet .. 650 milliGRAM(s) Oral every 6 hours PRN Temp greater or equal to 38C (100.4F), Mild Pain (1 - 3)  albuterol    90 MICROgram(s) HFA Inhaler 2 Puff(s) Inhalation every 6 hours PRN for bronchospasm  ondansetron Injectable 4 milliGRAM(s) IV Push every 8 hours PRN Nausea and/or Vomiting      Allergies    losartan (Angioedema)    Intolerances    Chicken (Stomach Upset)      PAST MEDICAL & SURGICAL HISTORY:  Arthritis      Legally blind      Pre-diabetes      Breast cancer  right, no chemo or radiation      COPD exacerbation      Atrial fibrillation      HF (heart failure)  HFpEF 7/29      HTN (hypertension)      Deep vein thrombosis (DVT)  Left Lower Extremity      H/O CHF      HTN (hypertension)      HLD (hyperlipidemia)      COPD exacerbation      CHF, acute      No significant past surgical history          FAMILY HISTORY:  FH: HTN (hypertension)        SOCIAL HISTORY  Smoking History:     REVIEW OF SYSTEMS:    CONSTITUTIONAL:  No fevers, chills, sweats    HEENT:  Eyes:  No diplopia or blurred vision. ENT:  No earache, sore throat or runny nose.    CARDIOVASCULAR:  No pressure, squeezing, tightness, or heaviness about the chest; no palpitations.    RESPIRATORY:  Per HPI    GASTROINTESTINAL:  No abdominal pain, nausea, vomiting or diarrhea.    GENITOURINARY:  No dysuria, frequency or urgency.    NEUROLOGIC:  No paresthesias, fasciculations, seizures or weakness.    PSYCHIATRIC:  No disorder of thought or mood.    Vital Signs Last 24 Hrs  T(C): 36.8 (12 Dec 2024 07:41), Max: 37.1 (11 Dec 2024 12:00)  T(F): 98.2 (12 Dec 2024 07:41), Max: 98.8 (11 Dec 2024 12:00)  HR: 78 (12 Dec 2024 08:32) (59 - 93)  BP: 136/90 (12 Dec 2024 07:41) (125/82 - 156/96)  BP(mean): --  RR: 18 (12 Dec 2024 08:45) (18 - 19)  SpO2: 100% (12 Dec 2024 08:45) (91% - 100%)    Parameters below as of 12 Dec 2024 08:45  Patient On (Oxygen Delivery Method): room air      I&O's Detail    11 Dec 2024 07:01  -  12 Dec 2024 07:00  --------------------------------------------------------  IN:  Total IN: 0 mL    OUT:    Voided (mL): 450 mL  Total OUT: 450 mL    Total NET: -450 mL          PHYSICAL EXAMINATION:    GENERAL: The patient is a well-developed, well-nourished _____in no apparent distress.     HEENT: Head is normocephalic and atraumatic. Extraocular muscles are intact. Mucous membranes are moist.     NECK: Supple.     LUNGS:+Wheezes anterior chest    HEART: Regular rate and rhythm without murmur.    ABDOMEN: Soft, nontender, and nondistended.  No hepatosplenomegaly is noted.    EXTREMITIES: Without any cyanosis, clubbing, rash, lesions or edema.    NEUROLOGIC: Grossly intact.      LABS:                        12.2   4.68  )-----------( 162      ( 12 Dec 2024 07:43 )             37.1     12-12    135  |  101  |  16  ----------------------------<  210[H]  3.7   |  30  |  0.74    Ca    8.6      12 Dec 2024 07:43  Phos  3.9     12-12  Mg     1.9     12-12    TPro  6.7  /  Alb  3.1[L]  /  TBili  0.6  /  DBili  x   /  AST  25  /  ALT  17  /  AlkPhos  139[H]  12-12    PT/INR - ( 11 Dec 2024 09:50 )   PT: 21.6 sec;   INR: 1.87 ratio         PTT - ( 11 Dec 2024 09:50 )  PTT:38.6 sec  Urinalysis Basic - ( 12 Dec 2024 07:43 )    Color: x / Appearance: x / SG: x / pH: x  Gluc: 210 mg/dL / Ketone: x  / Bili: x / Urobili: x   Blood: x / Protein: x / Nitrite: x   Leuk Esterase: x / RBC: x / WBC x   Sq Epi: x / Non Sq Epi: x / Bacteria: x                      MICROBIOLOGY:    RADIOLOGY & ADDITIONAL STUDIES:    CXR:  < from: Xray Chest 1 View- PORTABLE-Urgent (12.11.24 @ 11:37) >  IMPRESSION:    No acute infiltrate. Cardiomegaly.      < end of copied text >    Ct scan chest;    ekg;    echo:

## 2024-12-12 NOTE — PROGRESS NOTE ADULT - ASSESSMENT
89 yo F, w/ morbid obesity,  ambulates with walker, with PMHx of HTN, HLD, COPD (never smoker, on 2L O2 at home), BIPAP at night , HFpEF (EF 55-60%), severe RV, pulmonary HTN,  Afib on Eliquis, Pulmonary HTN, presents with 1.5 weeks history of dry cough, SOB at rest and on exertion that has progressively worsened. Admitted to telemetry for NSTEMI, COPD exacerbation and influenza

## 2024-12-13 ENCOUNTER — TRANSCRIPTION ENCOUNTER (OUTPATIENT)
Age: 88
End: 2024-12-13

## 2024-12-13 DIAGNOSIS — R91.1 SOLITARY PULMONARY NODULE: ICD-10-CM

## 2024-12-13 DIAGNOSIS — I27.20 PULMONARY HYPERTENSION, UNSPECIFIED: ICD-10-CM

## 2024-12-13 DIAGNOSIS — R78.81 BACTEREMIA: ICD-10-CM

## 2024-12-13 LAB
-  COAGULASE NEGATIVE STAPHYLOCOCCUS: SIGNIFICANT CHANGE UP
ALBUMIN SERPL ELPH-MCNC: 3.2 G/DL — LOW (ref 3.5–5)
ALP SERPL-CCNC: 138 U/L — HIGH (ref 40–120)
ALT FLD-CCNC: 20 U/L DA — SIGNIFICANT CHANGE UP (ref 10–60)
ANION GAP SERPL CALC-SCNC: 3 MMOL/L — LOW (ref 5–17)
AST SERPL-CCNC: 25 U/L — SIGNIFICANT CHANGE UP (ref 10–40)
BASOPHILS # BLD AUTO: 0 K/UL — SIGNIFICANT CHANGE UP (ref 0–0.2)
BASOPHILS NFR BLD AUTO: 0 % — SIGNIFICANT CHANGE UP (ref 0–2)
BILIRUB SERPL-MCNC: 0.5 MG/DL — SIGNIFICANT CHANGE UP (ref 0.2–1.2)
BUN SERPL-MCNC: 21 MG/DL — HIGH (ref 7–18)
CALCIUM SERPL-MCNC: 8.9 MG/DL — SIGNIFICANT CHANGE UP (ref 8.4–10.5)
CHLORIDE SERPL-SCNC: 100 MMOL/L — SIGNIFICANT CHANGE UP (ref 96–108)
CO2 SERPL-SCNC: 33 MMOL/L — HIGH (ref 22–31)
CREAT SERPL-MCNC: 0.71 MG/DL — SIGNIFICANT CHANGE UP (ref 0.5–1.3)
EGFR: 79 ML/MIN/1.73M2 — SIGNIFICANT CHANGE UP
EOSINOPHIL # BLD AUTO: 0 K/UL — SIGNIFICANT CHANGE UP (ref 0–0.5)
EOSINOPHIL NFR BLD AUTO: 0 % — SIGNIFICANT CHANGE UP (ref 0–6)
GLUCOSE SERPL-MCNC: 186 MG/DL — HIGH (ref 70–99)
GRAM STN FLD: ABNORMAL
HCT VFR BLD CALC: 38.5 % — SIGNIFICANT CHANGE UP (ref 34.5–45)
HGB BLD-MCNC: 12.4 G/DL — SIGNIFICANT CHANGE UP (ref 11.5–15.5)
IMM GRANULOCYTES NFR BLD AUTO: 0.4 % — SIGNIFICANT CHANGE UP (ref 0–0.9)
LYMPHOCYTES # BLD AUTO: 0.94 K/UL — LOW (ref 1–3.3)
LYMPHOCYTES # BLD AUTO: 12.3 % — LOW (ref 13–44)
MAGNESIUM SERPL-MCNC: 2.1 MG/DL — SIGNIFICANT CHANGE UP (ref 1.6–2.6)
MCHC RBC-ENTMCNC: 29.7 PG — SIGNIFICANT CHANGE UP (ref 27–34)
MCHC RBC-ENTMCNC: 32.2 G/DL — SIGNIFICANT CHANGE UP (ref 32–36)
MCV RBC AUTO: 92.3 FL — SIGNIFICANT CHANGE UP (ref 80–100)
METHOD TYPE: SIGNIFICANT CHANGE UP
MONOCYTES # BLD AUTO: 0.31 K/UL — SIGNIFICANT CHANGE UP (ref 0–0.9)
MONOCYTES NFR BLD AUTO: 4.1 % — SIGNIFICANT CHANGE UP (ref 2–14)
NEUTROPHILS # BLD AUTO: 6.35 K/UL — SIGNIFICANT CHANGE UP (ref 1.8–7.4)
NEUTROPHILS NFR BLD AUTO: 83.2 % — HIGH (ref 43–77)
NRBC # BLD: 0 /100 WBCS — SIGNIFICANT CHANGE UP (ref 0–0)
PHOSPHATE SERPL-MCNC: 3.5 MG/DL — SIGNIFICANT CHANGE UP (ref 2.5–4.5)
PLATELET # BLD AUTO: 177 K/UL — SIGNIFICANT CHANGE UP (ref 150–400)
POTASSIUM SERPL-MCNC: 4.4 MMOL/L — SIGNIFICANT CHANGE UP (ref 3.5–5.3)
POTASSIUM SERPL-SCNC: 4.4 MMOL/L — SIGNIFICANT CHANGE UP (ref 3.5–5.3)
PROT SERPL-MCNC: 7 G/DL — SIGNIFICANT CHANGE UP (ref 6–8.3)
RBC # BLD: 4.17 M/UL — SIGNIFICANT CHANGE UP (ref 3.8–5.2)
RBC # FLD: 14.5 % — SIGNIFICANT CHANGE UP (ref 10.3–14.5)
SODIUM SERPL-SCNC: 136 MMOL/L — SIGNIFICANT CHANGE UP (ref 135–145)
WBC # BLD: 7.63 K/UL — SIGNIFICANT CHANGE UP (ref 3.8–10.5)
WBC # FLD AUTO: 7.63 K/UL — SIGNIFICANT CHANGE UP (ref 3.8–10.5)

## 2024-12-13 RX ORDER — CARVEDILOL 25 MG/1
12.5 TABLET, FILM COATED ORAL EVERY 12 HOURS
Refills: 0 | Status: DISCONTINUED | OUTPATIENT
Start: 2024-12-13 | End: 2024-12-18

## 2024-12-13 RX ORDER — VANCOMYCIN HCL 900 MCG/MG
POWDER (GRAM) MISCELLANEOUS
Refills: 0 | Status: DISCONTINUED | OUTPATIENT
Start: 2024-12-13 | End: 2024-12-14

## 2024-12-13 RX ORDER — VANCOMYCIN HCL 900 MCG/MG
1750 POWDER (GRAM) MISCELLANEOUS EVERY 12 HOURS
Refills: 0 | Status: DISCONTINUED | OUTPATIENT
Start: 2024-12-14 | End: 2024-12-14

## 2024-12-13 RX ORDER — KETOROLAC TROMETHAMINE 30 MG/ML
15 INJECTION INTRAMUSCULAR; INTRAVENOUS ONCE
Refills: 0 | Status: DISCONTINUED | OUTPATIENT
Start: 2024-12-13 | End: 2024-12-13

## 2024-12-13 RX ORDER — VANCOMYCIN HCL 900 MCG/MG
1750 POWDER (GRAM) MISCELLANEOUS ONCE
Refills: 0 | Status: COMPLETED | OUTPATIENT
Start: 2024-12-13 | End: 2024-12-13

## 2024-12-13 RX ORDER — HYDRALAZINE HYDROCHLORIDE 10 MG/1
25 TABLET ORAL EVERY 8 HOURS
Refills: 0 | Status: DISCONTINUED | OUTPATIENT
Start: 2024-12-13 | End: 2024-12-15

## 2024-12-13 RX ORDER — METHYLPREDNISOLONE SOD SUCC 125 MG
40 VIAL (EA) INJECTION EVERY 12 HOURS
Refills: 0 | Status: DISCONTINUED | OUTPATIENT
Start: 2024-12-13 | End: 2024-12-15

## 2024-12-13 RX ORDER — LIDOCAINE 40 MG/G
1 CREAM TOPICAL EVERY 24 HOURS
Refills: 0 | Status: DISCONTINUED | OUTPATIENT
Start: 2024-12-13 | End: 2024-12-18

## 2024-12-13 RX ORDER — TRAMADOL HYDROCHLORIDE 300 MG/1
25 CAPSULE ORAL EVERY 8 HOURS
Refills: 0 | Status: DISCONTINUED | OUTPATIENT
Start: 2024-12-13 | End: 2024-12-18

## 2024-12-13 RX ADMIN — Medication 75 MILLIGRAM(S): at 17:35

## 2024-12-13 RX ADMIN — ACETAMINOPHEN 500MG 650 MILLIGRAM(S): 500 TABLET, COATED ORAL at 06:12

## 2024-12-13 RX ADMIN — Medication 2 PUFF(S): at 11:34

## 2024-12-13 RX ADMIN — AZITHROMYCIN 255 MILLIGRAM(S): 250 TABLET, FILM COATED ORAL at 05:10

## 2024-12-13 RX ADMIN — LIDOCAINE 1 PATCH: 40 CREAM TOPICAL at 11:40

## 2024-12-13 RX ADMIN — LIDOCAINE 1 PATCH: 40 CREAM TOPICAL at 20:57

## 2024-12-13 RX ADMIN — Medication 250 MILLIGRAM(S): at 20:48

## 2024-12-13 RX ADMIN — FUROSEMIDE 40 MILLIGRAM(S): 40 TABLET ORAL at 05:12

## 2024-12-13 RX ADMIN — CARVEDILOL 12.5 MILLIGRAM(S): 25 TABLET, FILM COATED ORAL at 17:35

## 2024-12-13 RX ADMIN — FAMOTIDINE 20 MILLIGRAM(S): 20 TABLET, FILM COATED ORAL at 17:35

## 2024-12-13 RX ADMIN — HYDRALAZINE HYDROCHLORIDE 25 MILLIGRAM(S): 10 TABLET ORAL at 22:23

## 2024-12-13 RX ADMIN — Medication 40 MILLIGRAM(S): at 17:35

## 2024-12-13 RX ADMIN — FLUTICASONE PROPIONATE AND SALMETEROL XINAFOATE 1 DOSE(S): 45; 21 AEROSOL, METERED RESPIRATORY (INHALATION) at 11:42

## 2024-12-13 RX ADMIN — APIXABAN 5 MILLIGRAM(S): 2.5 TABLET, FILM COATED ORAL at 17:36

## 2024-12-13 RX ADMIN — ACETAMINOPHEN 500MG 650 MILLIGRAM(S): 500 TABLET, COATED ORAL at 11:41

## 2024-12-13 RX ADMIN — Medication 75 MILLIGRAM(S): at 05:12

## 2024-12-13 RX ADMIN — HYDRALAZINE HYDROCHLORIDE 25 MILLIGRAM(S): 10 TABLET ORAL at 13:52

## 2024-12-13 RX ADMIN — MONTELUKAST SODIUM 10 MILLIGRAM(S): 10 TABLET ORAL at 11:34

## 2024-12-13 RX ADMIN — ANASTROZOLE 1 MILLIGRAM(S): 1 TABLET, COATED ORAL at 11:34

## 2024-12-13 RX ADMIN — ACETAMINOPHEN 500MG 650 MILLIGRAM(S): 500 TABLET, COATED ORAL at 05:12

## 2024-12-13 RX ADMIN — APIXABAN 5 MILLIGRAM(S): 2.5 TABLET, FILM COATED ORAL at 05:12

## 2024-12-13 RX ADMIN — ACETAMINOPHEN 500MG 650 MILLIGRAM(S): 500 TABLET, COATED ORAL at 14:00

## 2024-12-13 RX ADMIN — FAMOTIDINE 20 MILLIGRAM(S): 20 TABLET, FILM COATED ORAL at 05:13

## 2024-12-13 RX ADMIN — CARVEDILOL 6.25 MILLIGRAM(S): 25 TABLET, FILM COATED ORAL at 05:13

## 2024-12-13 RX ADMIN — KETOROLAC TROMETHAMINE 15 MILLIGRAM(S): 30 INJECTION INTRAMUSCULAR; INTRAVENOUS at 15:02

## 2024-12-13 RX ADMIN — KETOROLAC TROMETHAMINE 15 MILLIGRAM(S): 30 INJECTION INTRAMUSCULAR; INTRAVENOUS at 14:29

## 2024-12-13 RX ADMIN — Medication 100 MILLIGRAM(S): at 05:11

## 2024-12-13 RX ADMIN — Medication 10 MILLIGRAM(S): at 22:23

## 2024-12-13 RX ADMIN — Medication 40 MILLIGRAM(S): at 05:13

## 2024-12-13 RX ADMIN — FLUTICASONE PROPIONATE AND SALMETEROL XINAFOATE 1 DOSE(S): 45; 21 AEROSOL, METERED RESPIRATORY (INHALATION) at 22:22

## 2024-12-13 NOTE — PROGRESS NOTE ADULT - SUBJECTIVE AND OBJECTIVE BOX
Patient was seen and examined  Patient is a 89y old  Female who presents with a chief complaint of Influenza, COPD  exacerbation (12 Dec 2024 15:26)      INTERVAL HPI/OVERNIGHT EVENTS:  T(C): 36.7 (12-13-24 @ 04:30), Max: 36.8 (12-12-24 @ 07:41)  HR: 70 (12-13-24 @ 04:30) (59 - 85)  BP: 149/108 (12-13-24 @ 04:30) (136/90 - 163/108)  RR: 19 (12-13-24 @ 04:30) (18 - 19)  SpO2: 96% (12-13-24 @ 04:30) (95% - 100%)  Wt(kg): --  I&O's Summary    12 Dec 2024 07:01  -  13 Dec 2024 07:00  --------------------------------------------------------  IN: 450 mL / OUT: 1600 mL / NET: -1150 mL        LABS:                        12.2   4.68  )-----------( 162      ( 12 Dec 2024 07:43 )             37.1     12-12    135  |  101  |  16  ----------------------------<  210[H]  3.7   |  30  |  0.74    Ca    8.6      12 Dec 2024 07:43  Phos  3.9     12-12  Mg     1.9     12-12    TPro  6.7  /  Alb  3.1[L]  /  TBili  0.6  /  DBili  x   /  AST  25  /  ALT  17  /  AlkPhos  139[H]  12-12    PT/INR - ( 11 Dec 2024 09:50 )   PT: 21.6 sec;   INR: 1.87 ratio         PTT - ( 11 Dec 2024 09:50 )  PTT:38.6 sec  Urinalysis Basic - ( 12 Dec 2024 07:43 )    Color: x / Appearance: x / SG: x / pH: x  Gluc: 210 mg/dL / Ketone: x  / Bili: x / Urobili: x   Blood: x / Protein: x / Nitrite: x   Leuk Esterase: x / RBC: x / WBC x   Sq Epi: x / Non Sq Epi: x / Bacteria: x      CAPILLARY BLOOD GLUCOSE              Urinalysis Basic - ( 12 Dec 2024 07:43 )    Color: x / Appearance: x / SG: x / pH: x  Gluc: 210 mg/dL / Ketone: x  / Bili: x / Urobili: x   Blood: x / Protein: x / Nitrite: x   Leuk Esterase: x / RBC: x / WBC x   Sq Epi: x / Non Sq Epi: x / Bacteria: x        MEDICATIONS  (STANDING):  anastrozole 1 milliGRAM(s) Oral daily  apixaban 5 milliGRAM(s) Oral every 12 hours  atorvastatin 10 milliGRAM(s) Oral at bedtime  azithromycin  IVPB 500 milliGRAM(s) IV Intermittent every 24 hours  carvedilol 6.25 milliGRAM(s) Oral every 12 hours  cefTRIAXone   IVPB 1000 milliGRAM(s) IV Intermittent every 24 hours  famotidine    Tablet 20 milliGRAM(s) Oral two times a day  fluticasone propionate/ salmeterol 100-50 MICROgram(s) Diskus 1 Dose(s) Inhalation two times a day  furosemide    Tablet 40 milliGRAM(s) Oral daily  methylPREDNISolone sodium succinate Injectable 40 milliGRAM(s) IV Push every 8 hours  montelukast 10 milliGRAM(s) Oral daily  oseltamivir 75 milliGRAM(s) Oral two times a day  tiotropium 2.5 MICROgram(s) Inhaler 2 Puff(s) Inhalation daily    MEDICATIONS  (PRN):  acetaminophen     Tablet .. 650 milliGRAM(s) Oral every 6 hours PRN Temp greater or equal to 38C (100.4F), Mild Pain (1 - 3)  albuterol    90 MICROgram(s) HFA Inhaler 2 Puff(s) Inhalation every 6 hours PRN for bronchospasm  ondansetron Injectable 4 milliGRAM(s) IV Push every 8 hours PRN Nausea and/or Vomiting      RADIOLOGY & ADDITIONAL TESTS:    Imaging Personally Reviewed:  [ ] YES  [ ] NO    REVIEW OF SYSTEMS:  CONSTITUTIONAL: No fever, weight loss, or fatigue  EYES: No eye pain, visual disturbances, or discharge  ENMT:  No difficulty hearing, tinnitus, vertigo; No sinus or throat pain  NECK: No pain or stiffness  BREASTS: No pain, masses, or nipple discharge  RESPIRATORY: No cough, wheezing, chills or hemoptysis; No shortness of breath  CARDIOVASCULAR: No chest pain, palpitations, dizziness, or leg swelling  GASTROINTESTINAL: No abdominal or epigastric pain. No nausea, vomiting, or hematemesis; No diarrhea or constipation. No melena or hematochezia.  GENITOURINARY: No dysuria, frequency, hematuria, or incontinence  NEUROLOGICAL: No headaches, memory loss, loss of strength, numbness, or tremors  SKIN: No itching, burning, rashes, or lesions   LYMPH NODES: No enlarged glands  ENDOCRINE: No heat or cold intolerance; No hair loss  MUSCULOSKELETAL: No joint pain or swelling; No muscle, back, or extremity pain  PSYCHIATRIC: No depression, anxiety, mood swings, or difficulty sleeping  HEME/LYMPH: No easy bruising, or bleeding gums  ALLERY AND IMMUNOLOGIC: No hives or eczema      Consultant(s) Notes Reviewed:  [ x ] YES  [ ] NO    PHYSICAL EXAM:  GENERAL: NAD, well-groomed, well-developed  HEAD:  Atraumatic, Normocephalic  EYES: EOMI, PERRLA, conjunctiva and sclera clear  ENMT: No tonsillar erythema, exudates, or enlargement; Moist mucous membranes, Good dentition, No lesions  NECK: Supple, No JVD, Normal thyroid  NERVOUS SYSTEM:  Alert & Oriented X3, Good concentration; Motor Strength 5/5 B/L upper and lower extremities; DTRs 2+ intact and symmetric  CHEST/LUNG: Clear to percussion bilaterally; No rales, rhonchi, wheezing, or rubs  HEART: Regular rate and rhythm; No murmurs, rubs, or gallops  ABDOMEN: Soft, Nontender, Nondistended; Bowel sounds present  EXTREMITIES:  2+ Peripheral Pulses, No clubbing, cyanosis, or edema  LYMPH: No lymphadenopathy noted  SKIN: No rashes or lesions    Care Discussed with Consultants/Other Providers [ x] YES  [ ] NO

## 2024-12-13 NOTE — DISCHARGE NOTE PROVIDER - PROVIDER TOKENS
PROVIDER:[TOKEN:[[id number]],FOLLOWUP:[2 weeks]],PROVIDER:[TOKEN:[[id number]],FOLLOWUP:[2 weeks]]

## 2024-12-13 NOTE — PROGRESS NOTE ADULT - PROBLEM SELECTOR PLAN 9
DVT ppx: Eliquis  GI: famotidine - Hx of HLD on Atorvastatin 10mg QD  - continue home med  - LDL 61 Well-developed, well nourished

## 2024-12-13 NOTE — DIETITIAN INITIAL EVALUATION ADULT - PERTINENT LABORATORY DATA
12-13    136  |  100  |  21[H]  ----------------------------<  186[H]  4.4   |  33[H]  |  0.71    Ca    8.9      13 Dec 2024 07:10  Phos  3.5     12-13  Mg     2.1     12-13    TPro  7.0  /  Alb  3.2[L]  /  TBili  0.5  /  DBili  x   /  AST  25  /  ALT  20  /  AlkPhos  138[H]  12-13

## 2024-12-13 NOTE — PROGRESS NOTE ADULT - PROBLEM SELECTOR PLAN 5
- Hx of Afib on Eliquis 5mg BID and coreg  - c/w home meds - Bcx 12/11: 1 bottle growing GPC  - continue Ceftriaxone 1g  - F/U repeat bcx in AM  - ID Dr Denton consulted - Bcx 12/11: 1 bottle growing GPC  - continue Ceftriaxone 1g  - F/U repeat bcx in AM  - ID Dr Denton consulted -- started on vancomycin 15mg/kg as discussed with ID

## 2024-12-13 NOTE — DIETITIAN INITIAL EVALUATION ADULT - NSFNSGIIOFT_GEN_A_CORE
Adjusted GKC=227 lb   Wt data in EMR: 254.34 lb 9/25/24; 244 lb 10/1/24--> 280 lb 12/11/24; 281.3 lb 12/13/24, appears wt gaining x 2-3m

## 2024-12-13 NOTE — DIETITIAN NUTRITION RISK NOTIFICATION - TREATMENT: THE FOLLOWING DIET HAS BEEN RECOMMENDED
Diet, Regular:   DASH/TLC {Sodium & Cholesterol Restricted}  No Egg  No Poultry (12-13-24 @ 11:58) [Pending Verification By Attending]

## 2024-12-13 NOTE — DIETITIAN INITIAL EVALUATION ADULT - PERTINENT MEDS FT
MEDICATIONS  (STANDING):  anastrozole 1 milliGRAM(s) Oral daily  apixaban 5 milliGRAM(s) Oral every 12 hours  atorvastatin 10 milliGRAM(s) Oral at bedtime  azithromycin  IVPB 500 milliGRAM(s) IV Intermittent every 24 hours  carvedilol 12.5 milliGRAM(s) Oral every 12 hours  cefTRIAXone   IVPB 1000 milliGRAM(s) IV Intermittent every 24 hours  famotidine    Tablet 20 milliGRAM(s) Oral two times a day  fluticasone propionate/ salmeterol 100-50 MICROgram(s) Diskus 1 Dose(s) Inhalation two times a day  furosemide    Tablet 40 milliGRAM(s) Oral daily  hydrALAZINE 25 milliGRAM(s) Oral every 8 hours  lidocaine   4% Patch 1 Patch Transdermal every 24 hours  methylPREDNISolone sodium succinate Injectable 40 milliGRAM(s) IV Push every 12 hours  montelukast 10 milliGRAM(s) Oral daily  oseltamivir 75 milliGRAM(s) Oral two times a day  tiotropium 2.5 MICROgram(s) Inhaler 2 Puff(s) Inhalation daily    MEDICATIONS  (PRN):  acetaminophen     Tablet .. 650 milliGRAM(s) Oral every 6 hours PRN Temp greater or equal to 38C (100.4F), Mild Pain (1 - 3)  albuterol    90 MICROgram(s) HFA Inhaler 2 Puff(s) Inhalation every 6 hours PRN for bronchospasm  ondansetron Injectable 4 milliGRAM(s) IV Push every 8 hours PRN Nausea and/or Vomiting

## 2024-12-13 NOTE — PROGRESS NOTE ADULT - ASSESSMENT
89 yo F, w/ morbid obesity,  ambulates with walker, with PMH of HTN, HLD, COPD (never smoker, on 2L O2 at home), BIPAP at night , HFpEF (EF 55-60%), Breast ca, Chronic Afib on Eliquis, Pulmonary HTN, presents with 1.5 weeks history  dry cough, SOB at rest and on exertion that has progressively worsened,pneumonia,Flu with borderline troponins.  1.Tele monitoring.  2.Flu-abx.  3.Chronic afib-eliquis,coreg.  4.COPD-MDI,steroids.  5.Breast ca-anastrozole.  6.Rt sided CHF-lasix.  7.Bipap at night.  8.CT chest shows pulm nodule-repeat 6-12 mo.  9.HTN-hydralazine 25mg tid.  10.Knee pain lidocaine patch.  11.PPI.

## 2024-12-13 NOTE — PROGRESS NOTE ADULT - PROBLEM SELECTOR PLAN 8
- Hx of HLD on Atorvastatin 10mg QD  - continue home med  - LDL 61 - Hx of HTN on Carvedilol 6.25mg BID, Lasix 40mg QD   - c/w home meds with parameters.  - carvedilol increased to 12.5mg BID  - start hydralazine for better BP control

## 2024-12-13 NOTE — PROGRESS NOTE ADULT - SUBJECTIVE AND OBJECTIVE BOX
Patient is a 89y old  Female who presents with a chief complaint of Influenza, COPD  exacerbation (13 Dec 2024 07:26)  Awake, alert, comfortable in bed in NAD. Clinically improved. Less cough and sob. No wheezing    INTERVAL HPI/OVERNIGHT EVENTS:      VITAL SIGNS:  T(F): 98.2 (12-13-24 @ 07:36)  HR: 68 (12-13-24 @ 07:36)  BP: 150/99 (12-13-24 @ 07:36)  RR: 18 (12-13-24 @ 07:36)  SpO2: 97% (12-13-24 @ 07:36)  Wt(kg): --  I&O's Detail    12 Dec 2024 07:01  -  13 Dec 2024 07:00  --------------------------------------------------------  IN:    Oral Fluid: 450 mL  Total IN: 450 mL    OUT:    Voided (mL): 1600 mL  Total OUT: 1600 mL    Total NET: -1150 mL      13 Dec 2024 07:01  -  13 Dec 2024 10:12  --------------------------------------------------------  IN:    Oral Fluid: 220 mL  Total IN: 220 mL    OUT:  Total OUT: 0 mL    Total NET: 220 mL              REVIEW OF SYSTEMS:    CONSTITUTIONAL:  No fevers, chills, sweats    HEENT:  Eyes:  No diplopia or blurred vision. ENT:  No earache, sore throat or runny nose.    CARDIOVASCULAR:  No pressure, squeezing, tightness, or heaviness about the chest; no palpitations.    RESPIRATORY:  Per HPI    GASTROINTESTINAL:  No abdominal pain, nausea, vomiting or diarrhea.    GENITOURINARY:  No dysuria, frequency or urgency.    NEUROLOGIC:  No paresthesias, fasciculations, seizures or weakness.    PSYCHIATRIC:  No disorder of thought or mood.      PHYSICAL EXAM:    Constitutional: Well developed and nourished  Eyes:Perrla  ENMT: normal  Neck:supple  Respiratory: good air entry  Cardiovascular: S1 S2 regular  Gastrointestinal: Soft, Non tender  Extremities: No edema  Vascular:normal  Neurological:Awake, alert,Ox3  Musculoskeletal:Normal      MEDICATIONS  (STANDING):  anastrozole 1 milliGRAM(s) Oral daily  apixaban 5 milliGRAM(s) Oral every 12 hours  atorvastatin 10 milliGRAM(s) Oral at bedtime  azithromycin  IVPB 500 milliGRAM(s) IV Intermittent every 24 hours  carvedilol 12.5 milliGRAM(s) Oral every 12 hours  cefTRIAXone   IVPB 1000 milliGRAM(s) IV Intermittent every 24 hours  famotidine    Tablet 20 milliGRAM(s) Oral two times a day  fluticasone propionate/ salmeterol 100-50 MICROgram(s) Diskus 1 Dose(s) Inhalation two times a day  furosemide    Tablet 40 milliGRAM(s) Oral daily  methylPREDNISolone sodium succinate Injectable 40 milliGRAM(s) IV Push every 12 hours  montelukast 10 milliGRAM(s) Oral daily  oseltamivir 75 milliGRAM(s) Oral two times a day  tiotropium 2.5 MICROgram(s) Inhaler 2 Puff(s) Inhalation daily    MEDICATIONS  (PRN):  acetaminophen     Tablet .. 650 milliGRAM(s) Oral every 6 hours PRN Temp greater or equal to 38C (100.4F), Mild Pain (1 - 3)  albuterol    90 MICROgram(s) HFA Inhaler 2 Puff(s) Inhalation every 6 hours PRN for bronchospasm  ondansetron Injectable 4 milliGRAM(s) IV Push every 8 hours PRN Nausea and/or Vomiting      Allergies    losartan (Angioedema)    Intolerances    Chicken (Stomach Upset)      LABS:                        12.4   7.63  )-----------( 177      ( 13 Dec 2024 07:10 )             38.5     12-13    136  |  100  |  21[H]  ----------------------------<  186[H]  4.4   |  33[H]  |  0.71    Ca    8.9      13 Dec 2024 07:10  Phos  3.5     12-13  Mg     2.1     12-13    TPro  7.0  /  Alb  3.2[L]  /  TBili  0.5  /  DBili  x   /  AST  25  /  ALT  20  /  AlkPhos  138[H]  12-13      Urinalysis Basic - ( 13 Dec 2024 07:10 )    Color: x / Appearance: x / SG: x / pH: x  Gluc: 186 mg/dL / Ketone: x  / Bili: x / Urobili: x   Blood: x / Protein: x / Nitrite: x   Leuk Esterase: x / RBC: x / WBC x   Sq Epi: x / Non Sq Epi: x / Bacteria: x            CAPILLARY BLOOD GLUCOSE            RADIOLOGY & ADDITIONAL TESTS:  < from: CT Chest No Cont (12.12.24 @ 13:14) >  IMPRESSION:    No pneumonia.    5 mm right upper lobe nodule appears new as described above. 6-12 month   follow-up noncontrast chest CT can be performed for further evaluation as   clinically warranted.    --- End of Report ---    < end of copied text >    CXR:    Ct scan chest:    ekg;    echo:< from: TTE W or WO Ultrasound Enhancing Agent (09.25.24 @ 10:54) >  CONCLUSIONS:      1. Technically difficult image quality.   2. Left atrium is severely dilated.   3. The right atrium is moderately dilated.   4. Left ventricular systolic function is normal with an ejection fraction of 57 % by Smith's method of disks.   5. There is increased LV mass and concentric hypertrophy.   6. Enlarged right ventricular cavity size and reduced right ventricular systolic function. Tricuspid annular plane systolic excursion (TAPSE) is 1.4 cm (normal >=1.7 cm).   7. Estimated pulmonary artery systolic pressure is 69 mmHg, consistent with severe pulmonary hypertension.      < end of copied text >

## 2024-12-13 NOTE — PROGRESS NOTE ADULT - PROBLEM SELECTOR PLAN 10
Cinthia Saldaña is a 64 y.o. female  Chief Complaint   Patient presents with    Hypothyroidism     follow up    GERD     follow up    Referral Follow Up    Cough     x 12 days that didn't develop into wheezing     1. Have you been to the ER, urgent care clinic since your last visit? Hospitalized since your last visit? No    2. Have you seen or consulted any other health care providers outside of the [address] [place] [address] since your last visit? Include any pap smears or colon screening.  No check pending cultures  antibiotics

## 2024-12-13 NOTE — DIETITIAN INITIAL EVALUATION ADULT - PROBLEM/PLAN-4
History  Chief Complaint   Patient presents with    Ascites     Patient was here Thursday and admitted for same problem  Patient states her abdomen is distended again and is uncomfortable  HPI     77-year-old female presents to the emergency department with painful abdominal distension similar to 1 week ago when she had 4 0 5 L of fluid taken off by paracentesis due to what felt to likely be ovarian cancer which has spread to her mesentery  She has not had formal evaluation for this condition yet, has an appointment tomorrow with Gynecology Oncology  She denies fevers chills or sweats  She states in the last day or so she has had slow progressive distention of the abdomen, but no difficulty with breathing  She is able to move about without much discomfort but the simple stretching of the abdomen that is bothersome to her  Stooling and urinating normally  No fevers chills or sweats  Medical decision making:    Pressure:  Return of ascites in a patient that had many L removed by paracentesis previously who at this time appears well otherwise and has no particular worrisome signs or symptoms of SBP but would benefit from basic laboratory assessment to ensure no organ derangements  Will contact Interventional Radiology to see what they would be able to offer as far as paracentesis in the next 12-24 hours  Prior to Admission Medications   Prescriptions Last Dose Informant Patient Reported? Taking? pantoprazole (PROTONIX) 40 mg tablet   No No   Sig: Take 1 tablet by mouth daily in the early morning      Facility-Administered Medications: None       Past Medical History:   Diagnosis Date    Arthritis     Hypertension 1/4/2018    Stroke ([facility] [address] ) 1995       Past Surgical History:   Procedure Laterality Date    JOINT REPLACEMENT      bilateral knee replacement        Family History   Problem Relation Age of Onset    Cancer Mother      I have reviewed and agree with the history as documented      Social History   Substance Use Topics    Smoking status: Never Smoker    Smokeless tobacco: Never Used    Alcohol use No        Review of Systems   Constitutional: Negative for chills, fatigue and fever  HENT: Negative for congestion, ear pain, rhinorrhea, sinus pressure, sore throat, trouble swallowing and voice change  Eyes: Negative for pain and visual disturbance  Respiratory: Negative for cough, choking, shortness of breath and stridor  Cardiovascular: Negative for chest pain, palpitations and leg swelling  Gastrointestinal: Positive for abdominal distention and abdominal pain  Negative for blood in stool, constipation, diarrhea, nausea and vomiting  Endocrine: Negative  Genitourinary: Negative for dysuria, flank pain, hematuria, pelvic pain and urgency  Musculoskeletal: Negative  Skin: Negative  Allergic/Immunologic: Negative  Neurological: Negative for dizziness, speech difficulty, weakness, light-headedness and numbness  Hematological: Negative  Psychiatric/Behavioral: Negative  Physical Exam  ED Triage Vitals   Temperature Pulse Respirations Blood Pressure SpO2   01/08/18 1441 01/08/18 1443 01/08/18 1443 01/08/18 1443 01/08/18 1443   98 1 °F (36 7 °C) 102 20 140/77 98 %      Temp Source Heart Rate Source Patient Position - Orthostatic VS BP Location FiO2 (%)   01/08/18 1441 01/08/18 1441 01/08/18 1441 01/08/18 1441 --   Oral Monitor Sitting Left arm       Pain Score       01/08/18 1440       No Pain           Orthostatic Vital Signs  Vitals:    01/08/18 1441 01/08/18 1443 01/08/18 1814 01/08/18 2002   BP:  140/77 136/84 164/85   Pulse:  102 88 103   Patient Position - Orthostatic VS: Sitting  Sitting Sitting       Physical Exam   Constitutional: She is oriented to person, place, and time  She appears well-developed and well-nourished  No distress  HENT:   Head: Normocephalic and atraumatic     Nose: Nose normal    Mouth/Throat: Oropharynx is clear and moist    Eyes: Conjunctivae and EOM are normal  Pupils are equal, round, and reactive to light  Neck: Normal range of motion  Neck supple  Cardiovascular: Normal rate, regular rhythm, normal heart sounds and intact distal pulses  Exam reveals no gallop and no friction rub  No murmur heard  Pulmonary/Chest: Effort normal and breath sounds normal  No stridor  No respiratory distress  She has no wheezes  She has no rales  She exhibits no tenderness  Abdominal: Soft  Bowel sounds are normal  She exhibits distension  There is tenderness (mild generalized throughout the abdomen  )  There is no rebound and no guarding  Musculoskeletal: Normal range of motion  She exhibits no deformity  Neurological: She is alert and oriented to person, place, and time  She exhibits normal muscle tone  Skin: Skin is warm and dry  No rash noted  No erythema  Psychiatric: She has a normal mood and affect  Vitals reviewed        ED Medications  Medications   morphine (PF) 4 mg/mL injection 4 mg (4 mg Intravenous Given 1/8/18 1959)       Diagnostic Studies  Results Reviewed     Procedure Component Value Units Date/Time    Ammonia [[id number]]  (Abnormal) Collected:  01/08/18 1856    Lab Status:  Final result Specimen:  Blood from Arm, Right Updated:  01/08/18 1926     Ammonia <10 (L) umol/L     Comprehensive metabolic panel [[id number]]  (Abnormal) Collected:  01/08/18 1856    Lab Status:  Final result Specimen:  Blood from Arm, Right Updated:  01/08/18 1920     Sodium 134 (L) mmol/L      Potassium 3 7 mmol/L      Chloride 98 (L) mmol/L      CO2 28 mmol/L      Anion Gap 8 mmol/L      BUN 13 mg/dL      Creatinine 0 74 mg/dL      Glucose 88 mg/dL      Calcium 8 4 mg/dL      AST 68 (H) U/L      ALT 48 U/L      Alkaline Phosphatase 55 U/L      Total Protein 7 1 g/dL      Albumin 2 4 (L) g/dL      Total Bilirubin 0 20 mg/dL      eGFR 86 ml/min/1 73sq m     Narrative:         National Kidney Disease Education Program recommendations are as follows:  GFR calculation is accurate only with a steady state creatinine  Chronic Kidney disease less than 60 ml/min/1 73 sq  meters  Kidney failure less than 15 ml/min/1 73 sq  meters  Lipase [[id number]]  (Normal) Collected:  01/08/18 1856    Lab Status:  Final result Specimen:  Blood from Arm, Right Updated:  01/08/18 1920     Lipase 155 u/L     Protime-INR [[id number]]  (Normal) Collected:  01/08/18 1856    Lab Status:  Final result Specimen:  Blood from Arm, Right Updated:  01/08/18 1916     Protime 13 0 seconds      INR 0 97    CBC and differential [[id number]]  (Abnormal) Collected:  01/08/18 1856    Lab Status:  Final result Specimen:  Blood from Arm, Right Updated:  01/08/18 1906     WBC 7 62 Thousand/uL      RBC 4 23 Million/uL      Hemoglobin 12 4 g/dL      Hematocrit 38 1 %      MCV 90 fL      MCH 29 3 pg      MCHC 32 5 g/dL      RDW 12 1 %      MPV 8 4 (L) fL      Platelets 373 (H) Thousands/uL      nRBC 0 /100 WBCs      Neutrophils Relative 64 %      Lymphocytes Relative 24 %      Monocytes Relative 10 %      Eosinophils Relative 1 %      Basophils Relative 1 %      Neutrophils Absolute 4 84 Thousands/µL      Lymphocytes Absolute 1 84 Thousands/µL      Monocytes Absolute 0 75 Thousand/µL      Eosinophils Absolute 0 10 Thousand/µL      Basophils Absolute 0 05 Thousands/µL                  No orders to display              Procedures  Procedures       Phone Contacts  ED Phone Contact    ED Course  ED Course as of Jan 08 2019 Mon Jan 08, 2018   [address] [facility] Paged IR    [phone] Spoke directly with Dr Miky Dominguez who states he is able to perform the paracentesis at 8am sharp in the morning                                  MDM  CritCare Time    Disposition  Final diagnoses:   Ascites     Time reflects when diagnosis was documented in both MDM as applicable and the Disposition within this note     Time User Action Codes Description Comment    1/8/2018  7:59 PM Maricruz, Case Add [R18 [id number]] Ascites       ED Disposition     ED Disposition Condition Comment    Discharge  Gunnar Shaver discharge to home/self care  Condition at discharge: Good        Follow-up Information     Follow up With Specialties Details Why [address]  Call in 1 day [phone]  Call at 7:30am for paracentesis at 8:00am         Discharge Medication List as of 1/8/2018  8:03 PM      START taking these medications    Details   morphine (MSIR) 15 mg tablet Take 0 5 tablets by mouth every 6 (six) hours as needed for severe pain for up to 10 days Max Daily Amount: 30 mg, Starting Mon 1/8/2018, Until Thu 1/18/2018, Print         CONTINUE these medications which have NOT CHANGED    Details   pantoprazole (PROTONIX) 40 mg tablet Take 1 tablet by mouth daily in the early morning, Starting Sat 1/6/2018, Print           No discharge procedures on file      ED Provider  Electronically Signed by           Mathew Lawrence,   01/08/18 2019 DISPLAY PLAN FREE TEXT

## 2024-12-13 NOTE — PROGRESS NOTE ADULT - ASSESSMENT
89 yo F, w/ morbid obesity,  ambulates with walker, with PMHx of HTN, HLD, COPD (never smoker, on 2L O2 at home), BIPAP at night , HFpEF (EF 55-60%), severe RV, pulmonary HTN,  Afib on Eliquis, Pulmonary HTN, presents with 1.5 weeks history of dry cough, SOB at rest and on exertion that has progressively worsened. Admitted to telemetry for NSTEMI, COPD exacerbation and influenza

## 2024-12-13 NOTE — PROGRESS NOTE ADULT - PROBLEM SELECTOR PLAN 4
- Patient denies dysuria, urinary frequency, urgency  - UA positive, Ucx growing GNR -- pending specificities and sensitivities  - Bcx 12/11: NGTD  - continue Ceftriaxone 1g - Patient denies dysuria, urinary frequency, urgency  - UA positive, Ucx growing GNR Klebsiella PNA -- pending sensitivities  - Bcx 12/11: 1 bottle growing GPC  - continue Ceftriaxone 1g - Patient denies dysuria, urinary frequency, urgency  - UA positive, Ucx growing GNR Klebsiella PNA -- pending sensitivities  - continue Ceftriaxone 1g  - ID Dr Denton consulted

## 2024-12-13 NOTE — DISCHARGE NOTE PROVIDER - NSDCMRMEDTOKEN_GEN_ALL_CORE_FT
albuterol 90 mcg/inh inhalation aerosol: 2 puff(s) inhaled every 6 hours as needed for  bronchospasm  anastrozole 1 mg oral tablet: 1 tab(s) orally once a day  atorvastatin 10 mg oral tablet: 1 tab(s) orally once a day (at bedtime)  carvedilol 6.25 mg oral tablet: 1 tab(s) orally 2 times a day  Eliquis 5 mg oral tablet: 1 tab(s) orally 2 times a day  famotidine 20 mg oral tablet: 1 tab(s) orally 2 times a day  Lasix 40 mg oral tablet: 1 tab(s) orally once a day  Singulair 10 mg oral tablet: 1 tab(s) orally once a day  Trelegy Ellipta 100 mcg-62.5 mcg-25 mcg/inh inhalation powder: 1 puff(s) inhaled once a day   albuterol 90 mcg/inh inhalation aerosol: 2 puff(s) inhaled every 6 hours as needed for  bronchospasm  anastrozole 1 mg oral tablet: 1 tab(s) orally once a day  atorvastatin 10 mg oral tablet: 1 tab(s) orally once a day (at bedtime)  carvedilol 6.25 mg oral tablet: 1 tab(s) orally 2 times a day  Eliquis 5 mg oral tablet: 1 tab(s) orally 2 times a day  famotidine 20 mg oral tablet: 1 tab(s) orally 2 times a day  hydrALAZINE 50 mg oral tablet: 1 tab(s) orally every 8 hours  Lasix 40 mg oral tablet: 1 tab(s) orally once a day  predniSONE 10 mg oral tablet: 3 tab(s) orally once a day Stop after 12/21/24  predniSONE 10 mg oral tablet: 1 tab(s) orally once a day Stop after 12/27/24  predniSONE 20 mg oral tablet: 1 tab(s) orally once a day Stop after 12/24/24  Singulair 10 mg oral tablet: 1 tab(s) orally once a day  Trelegy Ellipta 100 mcg-62.5 mcg-25 mcg/inh inhalation powder: 1 puff(s) inhaled once a day

## 2024-12-13 NOTE — DIETITIAN INITIAL EVALUATION ADULT - PROBLEM SELECTOR PROBLEM 3
[Back Pain] : back pain [___ yrs] : [unfilled] year(s) ago [5] : an average pain level of 5/10 Influenza A [2] : a minimum pain level of 2/10 [10] : a maximum pain level of 10/10 [Aching] : aching [Home] : at home, [unfilled] , at the time of the visit. [Medical Office: ([facility])___] : at the medical office located in  [Verbal consent obtained from patient] : the patient, [unfilled] [FreeTextEntry1] : Interval Note:\par Patient reports that she has been shoveling lately and reports that she has exacerbated pain in bilateral knees. R>L.  Reports pain is worse with transition and weight bearing.  In addition, it is affecting her gait.  Pain is so bad that patient finds it difficult to perform adls and ambulate\par \par denies any worsening numbness, weakness, bowel/bladder dysfunction\par \par \par HPI\par \par Ms. EBER FIGUEROA is a 42 year F with pmhx of htn present with many years of bilateral back pain without inciting event.  It is axial and worse with transition.  Affects her ability to care for her children.  denies any worsening numbness, weakness, bowel/bladder dysfunction\par \par \par \par Previous and current pain medications/doses/effects:\par \par Advil without improvement\par \par Previous Pain Treatments:\par \par PT, Osteopathic treatment, Chiropractic care without improvement\par \par Previous Pain Injections:\par \par  left knee steroid injection 1/4/2021\par  right knee steroid injection 12/28/2020\par  LEFT then RIGHT  L3-sacral ala medial branch radiofrequency ablation completed 8/7/2020\par s/p bilateral L3-sacral ala medial branch block with 100% improvement in axial back pain 7/9/2020\par STEPHANI 1 year ago with mild improvement\par \par Previous Diagnostic Studies/Images:\par \par XR BL Knee 12/2020\par \par Minimal productive change at the lateral joint margin of each knee with the femoral\par tibial joint spaces maintained. There are bilateral patellar spurs evident with mild DJD of the\par right femoral patella joint space at the lateral margin.\par \par  No evidence of suprapatellar effusion. No evidence of fracture or suspicious lesion.\par \par \par  IMPRESSION: Mild degenerative changes as above.\par \par MRI LS 5/2018\par \par L5-S1 degenerative disc narrowing and endplate arthritis\par \par L5-S1 disc bulge spondylosis. No focal disc herniation. Mild bilateral degenerative frontal stenosis secondary to lateral degenerative changes. Mild facet arthritis.\par \par  [FreeTextEntry3] : per pt everything  [FreeTextEntry4] : per pt n/a

## 2024-12-13 NOTE — PROGRESS NOTE ADULT - PROBLEM SELECTOR PLAN 1
- p/w 1.5 weeks history of dry cough, SOB at rest and on exertion that has progressively worsened.   - Trop 61.2 > 72.1 > 41.9, likely demand ischemia from flu/infection  - ProBNP 3314  - EK BPM, Afib   - CXR/CT chest: negative for pneumonia   - TTE (): HFpEF (EF 57%), severe pulmonary HTN, reduced RV systolic function   - LDL 61  - continue carvedilol  - Dr. Siu following

## 2024-12-13 NOTE — DISCHARGE NOTE PROVIDER - NSDCCPCAREPLAN_GEN_ALL_CORE_FT
PRINCIPAL DISCHARGE DIAGNOSIS  Diagnosis: COPD exacerbation  Assessment and Plan of Treatment: You presented with worsening dry cough and shortness of breath. Your CT chest did not show pneumonia. You were evaluated by pulmonologist and treated with IV steroid and IV antibiotics  Continue your home medication Trellegy and Albuterol as needed  Continue oral antibiotics?????????????????  You are saturating well on room air  Call your Health Care provider upon arrival home to make a follow up appointment within one week.  If your cough increases infrequency and severity and/or you have shortness of breath or increased shortness of breath call your Health Care Provider.  If you develop fever, chills, night sweats, malaise, and/or change in mental status call your Health care Provider.  Nutrition is very important.  Eat small frequent meals.  Increase your activity as tolerated.  Do not stay in bed all day      SECONDARY DISCHARGE DIAGNOSES  Diagnosis: NSTEMI (non-ST elevation myocardial infarction)  Assessment and Plan of Treatment: You were found to have elevated troponin, cardiac enzyme that can indicate cardiac ischemia. You were evaluate by cardiologist. You had echocardiogram in 09/2024 with EF 57%. Continue carvedilol at home  Follow up with cardiologist outpatient    Diagnosis: Influenza A  Assessment and Plan of Treatment: You were found to have influenza. You were treated with Tamiflu to complete for 5 days  Avoid sick contacts.  Continue with covering your cough and good hand washing practices.  Maintain adequate nutrition, hydration , rest, and activity as tolerated.   Follow-up with your primary care physician within 1 week. Call for appointment.    Diagnosis: Afib  Assessment and Plan of Treatment: Continue Eliquis and coreg at home  Atrial fibrillation is the most common heart rhythm problem & has the risk of stroke & heart attack  It helps if you control your blood pressure, cut down on caffeine, getting treatment for over active thyroid gland, & getting exercise  Call your doctor if you feel your heart racing or beating unusually, chest tightness or pain, lightheaded, faint, shortness of breath especially with exercise    Diagnosis: HTN (hypertension)  Assessment and Plan of Treatment: Continue Carvedilol, Hydralazine and lasix. Your blood pressure was not controlled during your hospital stay. Your carvedilol medication was increased and you were started on Hydralazine for better blood pressure control  Follow up with primary care provider to establish long term goal for your blood pressure and for long term management and monitoring  Notify your doctor if you have any of the following symptoms:   Dizziness, Lightheadedness, Blurry vision, Headache, Chest pain, Shortness of breath    Diagnosis: HLD (hyperlipidemia)  Assessment and Plan of Treatment: Continue your anticholesterol medication  Follow up with PCP for treatment goals, continue medication, have liver function testing every 3 months as anti lipid medications can cause liver irritation, eat low fat, low cholesterol meals    Diagnosis: Chronic heart failure with preserved ejection fraction (HFpEF)  Assessment and Plan of Treatment: Continue Carvedilol 12.5mg BID, Lasix 40mg QD at home  Weigh yourself daily.  If you gain 3lbs in 3 days, or 5lbs in a week call your Health Care Provider.  Do not eat or drink foods containing more than 2000mg of salt (sodium) in your diet every day.  Call your Health Care Provider if you have any swelling or increased swelling in your feet, ankles, and/or stomach.  Take all of your medication as directed.  If you become dizzy call your Health Care Provider.    Diagnosis: UTI (urinary tract infection)  Assessment and Plan of Treatment: You were found to have bacteria in your urine. You were treated with IV antibiotics. You are to continue?????????  Drink enough water and fluids to keep your urine clear or pale yellow.  Avoid caffeine, tea, and carbonated beverages. They tend to irritate your bladder.  Empty your bladder often. Avoid holding urine for long periods of time.  SEEK MEDICAL CARE IF:  You have back pain.  You develop a fever.  Your symptoms do not begin to resolve within 3 days.  SEEK IMMEDIATE MEDICAL CARE IF:  You have severe back pain or lower abdominal pain.  You develop chills.  You have nausea or vomiting.  You have continued burning or discomfort with urination.     PRINCIPAL DISCHARGE DIAGNOSIS  Diagnosis: COPD exacerbation  Assessment and Plan of Treatment: You presented with worsening dry cough and shortness of breath. Your CT chest did not show pneumonia. You were evaluated by pulmonologist and treated with IV steroid and IV antibiotics  Continue your home medication Trellegy and Albuterol as needed  Continue oral antibiotics?????????????????  You are saturating well on room air  Call your Health Care provider upon arrival home to make a follow up appointment within one week.  If your cough increases infrequency and severity and/or you have shortness of breath or increased shortness of breath call your Health Care Provider.  If you develop fever, chills, night sweats, malaise, and/or change in mental status call your Health care Provider.  Nutrition is very important.  Eat small frequent meals.  Increase your activity as tolerated.  Do not stay in bed all day      SECONDARY DISCHARGE DIAGNOSES  Diagnosis: Urinary tract infection due to ESBL Klebsiella  Assessment and Plan of Treatment: You were noted with ESBL Klebsiella UTI, & treated with IV antibiotics Ertapenem, & completed the course.  Drink enough water and fluids to keep your urine clear or pale yellow.  Avoid caffeine, tea, and carbonated beverages. They tend to irritate your bladder.  Empty your bladder often. Avoid holding urine for long periods of time.  SEEK MEDICAL CARE IF:  You have back pain.  You develop a fever.  Your symptoms do not begin to resolve within 3 days.  SEEK IMMEDIATE MEDICAL CARE IF:  You have severe back pain or lower abdominal pain.  You develop chills.  You have nausea or vomiting.  You have continued burning or discomfort with urination.    Diagnosis: NSTEMI (non-ST elevation myocardial infarction)  Assessment and Plan of Treatment: You were found to have elevated troponin, cardiac enzyme that can indicate cardiac ischemia. You were evaluate by cardiologist. You had echocardiogram in 09/2024 with EF 57%. Continue carvedilol at home  Follow up with cardiologist outpatient    Diagnosis: Influenza A  Assessment and Plan of Treatment: You were found to have influenza. You were treated with Tamiflu to complete for 5 days  Avoid sick contacts.  Continue with covering your cough and good hand washing practices.  Maintain adequate nutrition, hydration , rest, and activity as tolerated.   Follow-up with your primary care physician within 1 week. Call for appointment.    Diagnosis: Afib  Assessment and Plan of Treatment: Continue Eliquis and coreg at home  Atrial fibrillation is the most common heart rhythm problem & has the risk of stroke & heart attack  It helps if you control your blood pressure, cut down on caffeine, getting treatment for over active thyroid gland, & getting exercise  Call your doctor if you feel your heart racing or beating unusually, chest tightness or pain, lightheaded, faint, shortness of breath especially with exercise    Diagnosis: Chronic heart failure with preserved ejection fraction (HFpEF)  Assessment and Plan of Treatment: Continue Carvedilol 12.5mg BID, Lasix 40mg QD at home  Weigh yourself daily.  If you gain 3lbs in 3 days, or 5lbs in a week call your Health Care Provider.  Do not eat or drink foods containing more than 2000mg of salt (sodium) in your diet every day.  Call your Health Care Provider if you have any swelling or increased swelling in your feet, ankles, and/or stomach.  Take all of your medication as directed.  If you become dizzy call your Health Care Provider.    Diagnosis: HTN (hypertension)  Assessment and Plan of Treatment: Continue Carvedilol, Hydralazine and lasix. Your blood pressure was not controlled during your hospital stay. Your carvedilol medication was increased and you were started on Hydralazine for better blood pressure control  Follow up with primary care provider to establish long term goal for your blood pressure and for long term management and monitoring  Notify your doctor if you have any of the following symptoms:   Dizziness, Lightheadedness, Blurry vision, Headache, Chest pain, Shortness of breath    Diagnosis: HLD (hyperlipidemia)  Assessment and Plan of Treatment: Continue your anticholesterol medication  Follow up with PCP for treatment goals, continue medication, have liver function testing every 3 months as anti lipid medications can cause liver irritation, eat low fat, low cholesterol meals     PRINCIPAL DISCHARGE DIAGNOSIS  Diagnosis: COPD exacerbation  Assessment and Plan of Treatment: You presented with worsening dry cough and shortness of breath. Your CT chest did not show pneumonia. You were evaluated by pulmonologist and treated with IV steroid and IV antibiotics  Continue your home medication Trellegy and Albuterol as needed  You are saturating well on room air  Call your Health Care provider upon arrival home to make a follow up appointment within one week.  If your cough increases infrequency and severity and/or you have shortness of breath or increased shortness of breath call your Health Care Provider.  If you develop fever, chills, night sweats, malaise, and/or change in mental status call your Health care Provider.  Nutrition is very important.  Eat small frequent meals.  Increase your activity as tolerated.  Do not stay in bed all day      SECONDARY DISCHARGE DIAGNOSES  Diagnosis: Urinary tract infection due to ESBL Klebsiella  Assessment and Plan of Treatment: You were noted with ESBL Klebsiella UTI, & treated with IV antibiotics Ertapenem, & completed the course.  Drink enough water and fluids to keep your urine clear or pale yellow.  Avoid caffeine, tea, and carbonated beverages. They tend to irritate your bladder.  Empty your bladder often. Avoid holding urine for long periods of time.  SEEK MEDICAL CARE IF:  You have back pain.  You develop a fever.  Your symptoms do not begin to resolve within 3 days.  SEEK IMMEDIATE MEDICAL CARE IF:  You have severe back pain or lower abdominal pain.  You develop chills.  You have nausea or vomiting.  You have continued burning or discomfort with urination.    Diagnosis: NSTEMI (non-ST elevation myocardial infarction)  Assessment and Plan of Treatment: You were found to have elevated troponin, cardiac enzyme that can indicate cardiac ischemia. You were evaluate by cardiologist. You had echocardiogram in 09/2024 with EF 57%. Continue carvedilol at home  Follow up with cardiologist outpatient    Diagnosis: Influenza A  Assessment and Plan of Treatment: You were found to have influenza. You were treated with Tamiflu to complete for 5 days  Avoid sick contacts.  Continue with covering your cough and good hand washing practices.  Maintain adequate nutrition, hydration , rest, and activity as tolerated.   Follow-up with your primary care physician within 1 week. Call for appointment.    Diagnosis: Lung nodule  Assessment and Plan of Treatment: You were noted with lung nodule on your cat scan.   You are advised to follow up with lung doctor in out/patient for repeat cat scan in 6-12m months.    Diagnosis: Afib  Assessment and Plan of Treatment: Continue Eliquis and coreg at home  Atrial fibrillation is the most common heart rhythm problem & has the risk of stroke & heart attack  It helps if you control your blood pressure, cut down on caffeine, getting treatment for over active thyroid gland, & getting exercise  Call your doctor if you feel your heart racing or beating unusually, chest tightness or pain, lightheaded, faint, shortness of breath especially with exercise    Diagnosis: Chronic heart failure with preserved ejection fraction (HFpEF)  Assessment and Plan of Treatment: Continue Carvedilol 12.5mg BID, Lasix 40mg QD at home  Weigh yourself daily.  If you gain 3lbs in 3 days, or 5lbs in a week call your Health Care Provider.  Do not eat or drink foods containing more than 2000mg of salt (sodium) in your diet every day.  Call your Health Care Provider if you have any swelling or increased swelling in your feet, ankles, and/or stomach.  Take all of your medication as directed.  If you become dizzy call your Health Care Provider.    Diagnosis: HTN (hypertension)  Assessment and Plan of Treatment: Continue Carvedilol, Hydralazine and lasix. Your blood pressure was not controlled during your hospital stay. Your carvedilol medication was increased and you were started on Hydralazine for better blood pressure control  Follow up with primary care provider to establish long term goal for your blood pressure and for long term management and monitoring  Notify your doctor if you have any of the following symptoms:   Dizziness, Lightheadedness, Blurry vision, Headache, Chest pain, Shortness of breath    Diagnosis: HLD (hyperlipidemia)  Assessment and Plan of Treatment: Continue your anticholesterol medication  Follow up with PCP for treatment goals, continue medication, have liver function testing every 3 months as anti lipid medications can cause liver irritation, eat low fat, low cholesterol meals     PRINCIPAL DISCHARGE DIAGNOSIS  Diagnosis: COPD exacerbation  Assessment and Plan of Treatment: You presented with worsening dry cough and shortness of breath. Your CT chest did not show pneumonia. You were evaluated by pulmonologist and treated with IV steroid and IV antibiotics  Continue your home medication Trellegy and Albuterol as needed  You are saturating well on room air  Call your Health Care provider upon arrival home to make a follow up appointment within one week.  If your cough increases infrequency and severity and/or you have shortness of breath or increased shortness of breath call your Health Care Provider.  If you develop fever, chills, night sweats, malaise, and/or change in mental status call your Health care Provider.  Nutrition is very important.  Eat small frequent meals.  Increase your activity as tolerated.  Do not stay in bed all day      SECONDARY DISCHARGE DIAGNOSES  Diagnosis: Urinary tract infection due to ESBL Klebsiella  Assessment and Plan of Treatment: You were noted with ESBL Klebsiella UTI, & treated with IV antibiotics Ertapenem, & completed the course.  Drink enough water and fluids to keep your urine clear or pale yellow.  Avoid caffeine, tea, and carbonated beverages. They tend to irritate your bladder.  Empty your bladder often. Avoid holding urine for long periods of time.  SEEK MEDICAL CARE IF:  You have back pain.  You develop a fever.  Your symptoms do not begin to resolve within 3 days.  SEEK IMMEDIATE MEDICAL CARE IF:  You have severe back pain or lower abdominal pain.  You develop chills.  You have nausea or vomiting.  You have continued burning or discomfort with urination.    Diagnosis: NSTEMI (non-ST elevation myocardial infarction)  Assessment and Plan of Treatment: You were found to have elevated troponin, cardiac enzyme that can indicate cardiac ischemia. You were evaluate by cardiologist. You had echocardiogram in 09/2024 with EF 57%. Continue carvedilol at home  Follow up with cardiologist outpatient    Diagnosis: Influenza A  Assessment and Plan of Treatment: You were found to have influenza. You were treated with Tamiflu to complete for 5 days  Avoid sick contacts.  Continue with covering your cough and good hand washing practices.  Maintain adequate nutrition, hydration , rest, and activity as tolerated.   Follow-up with your primary care physician within 1 week. Call for appointment.    Diagnosis: Lung nodule  Assessment and Plan of Treatment: You were noted with a right upper lung nodule on your cat scan.   You are advised to follow up with lung doctor in out/patient for repeat cat scan in 6-12 months.    Diagnosis: Afib  Assessment and Plan of Treatment: Continue Eliquis and coreg at home  Atrial fibrillation is the most common heart rhythm problem & has the risk of stroke & heart attack  It helps if you control your blood pressure, cut down on caffeine, getting treatment for over active thyroid gland, & getting exercise  Call your doctor if you feel your heart racing or beating unusually, chest tightness or pain, lightheaded, faint, shortness of breath especially with exercise    Diagnosis: Chronic heart failure with preserved ejection fraction (HFpEF)  Assessment and Plan of Treatment: Continue Carvedilol 12.5mg BID, Lasix 40mg QD at home  Weigh yourself daily.  If you gain 3lbs in 3 days, or 5lbs in a week call your Health Care Provider.  Do not eat or drink foods containing more than 2000mg of salt (sodium) in your diet every day.  Call your Health Care Provider if you have any swelling or increased swelling in your feet, ankles, and/or stomach.  Take all of your medication as directed.  If you become dizzy call your Health Care Provider.    Diagnosis: HTN (hypertension)  Assessment and Plan of Treatment: Continue Carvedilol, Hydralazine and lasix. Your blood pressure was not controlled during your hospital stay. Your carvedilol medication was increased and you were started on Hydralazine for better blood pressure control  Follow up with primary care provider to establish long term goal for your blood pressure and for long term management and monitoring  Notify your doctor if you have any of the following symptoms:   Dizziness, Lightheadedness, Blurry vision, Headache, Chest pain, Shortness of breath    Diagnosis: HLD (hyperlipidemia)  Assessment and Plan of Treatment: Continue your anticholesterol medication  Follow up with PCP for treatment goals, continue medication, have liver function testing every 3 months as anti lipid medications can cause liver irritation, eat low fat, low cholesterol meals

## 2024-12-13 NOTE — DIETITIAN INITIAL EVALUATION ADULT - OTHER INFO
Pt lives home with family PTA, previous admission with RD Assessment 9/27/24; alert, oriented, granddaughter at bedside, pt/family well-communicated; Endorsed appetite good, denied recent wt changes (wt data in EMR see below), denied GI distress, chewing or swallowing problem; Allergy to chicken, food choices obtained and forwarded to Dietary; eating well in-house per family, but 0-25% intake at times per Flowsheet, intake may vary depending on food/how pt feels

## 2024-12-13 NOTE — PROGRESS NOTE ADULT - SUBJECTIVE AND OBJECTIVE BOX
NP Note discussed with  Primary Attending    Patient is a 89y old  Female who presents with a chief complaint of Chronic obstructive pulmonary disease with acute exacerbation     (13 Dec 2024 11:56)      INTERVAL HPI/OVERNIGHT EVENTS:  89 yo F, w/ morbid obesity, ambulates with walker, with PMHx of HTN, HLD, COPD (never smoker, on 2L O2 at home), BIPAP at night , HFpEF (EF 55-60%), severe RV, Afib on Eliquis, Pulmonary HTN, presents with 1.5 weeks history of dry cough, SOB at rest and on exertion that has progressively worsened. Patient developed fever, chills, body aches. Patient endorses sick contact with daughter who might currently have the flu. Patient did not receive the flu vaccine this season. Admitted to telemetry for NSTEMI, COPD exacerbation found to have influenza    CXR showed No acute infiltrate. Cardiomegaly. CT chest showed No pneumonia. 5 mm right upper lobe nodule appears new as described above. 6-12 month follow-up noncontrast chest CT can be performed for further evaluation as clinically warranted.  Pulmonology following, started on IV abx and steroid IV. Cardiology following, with mild elevation on troponin    Pt is awake, alert, oriented x 3, no SOB, reported feeling better    MEDICATIONS  (STANDING):  anastrozole 1 milliGRAM(s) Oral daily  apixaban 5 milliGRAM(s) Oral every 12 hours  atorvastatin 10 milliGRAM(s) Oral at bedtime  azithromycin  IVPB 500 milliGRAM(s) IV Intermittent every 24 hours  carvedilol 12.5 milliGRAM(s) Oral every 12 hours  cefTRIAXone   IVPB 1000 milliGRAM(s) IV Intermittent every 24 hours  famotidine    Tablet 20 milliGRAM(s) Oral two times a day  fluticasone propionate/ salmeterol 100-50 MICROgram(s) Diskus 1 Dose(s) Inhalation two times a day  furosemide    Tablet 40 milliGRAM(s) Oral daily  hydrALAZINE 25 milliGRAM(s) Oral every 8 hours  ketorolac   Injectable 15 milliGRAM(s) IV Push once  lidocaine   4% Patch 1 Patch Transdermal every 24 hours  methylPREDNISolone sodium succinate Injectable 40 milliGRAM(s) IV Push every 12 hours  montelukast 10 milliGRAM(s) Oral daily  oseltamivir 75 milliGRAM(s) Oral two times a day  tiotropium 2.5 MICROgram(s) Inhaler 2 Puff(s) Inhalation daily    MEDICATIONS  (PRN):  acetaminophen     Tablet .. 650 milliGRAM(s) Oral every 6 hours PRN Temp greater or equal to 38C (100.4F), Mild Pain (1 - 3)  albuterol    90 MICROgram(s) HFA Inhaler 2 Puff(s) Inhalation every 6 hours PRN for bronchospasm  ondansetron Injectable 4 milliGRAM(s) IV Push every 8 hours PRN Nausea and/or Vomiting  traMADol 25 milliGRAM(s) Oral every 8 hours PRN Severe Pain (7 - 10)      __________________________________________________  REVIEW OF SYSTEMS:    CONSTITUTIONAL: No fever,   EYES: no acute visual disturbances  NECK: No pain or stiffness  RESPIRATORY: No cough; No shortness of breath  CARDIOVASCULAR: No chest pain, no palpitations  GASTROINTESTINAL: No pain. No nausea or vomiting; No diarrhea   NEUROLOGICAL: No headache or numbness, no tremors  MUSCULOSKELETAL: + right knee pain  GENITOURINARY: no dysuria, no frequency, no hesitancy  ALL OTHER  ROS negative        Vital Signs Last 24 Hrs  T(C): 36.9 (13 Dec 2024 11:02), Max: 36.9 (13 Dec 2024 11:02)  T(F): 98.4 (13 Dec 2024 11:02), Max: 98.4 (13 Dec 2024 11:02)  HR: 82 (13 Dec 2024 11:02) (59 - 85)  BP: 135/97 (13 Dec 2024 11:02) (135/97 - 163/108)  BP(mean): --  RR: 18 (13 Dec 2024 11:02) (18 - 19)  SpO2: 97% (13 Dec 2024 11:02) (95% - 100%)    Parameters below as of 13 Dec 2024 11:02  Patient On (Oxygen Delivery Method): room air        ________________________________________________  PHYSICAL EXAM:  GENERAL: NAD  HEENT: Normocephalic; moist mucous membranes;   NECK : supple  CHEST/LUNG: Clear to auscultation bilaterally with good air entry   HEART: S1 S2  regular;  ABDOMEN: Soft, Nontender, Nondistended; Bowel sounds present  EXTREMITIES: no cyanosis; no edema; no calf tenderness  SKIN: warm and dry; no rash  NERVOUS SYSTEM:  Awake and alert; Oriented to place, person and time    _________________________________________________  LABS:                        12.4   7.63  )-----------( 177      ( 13 Dec 2024 07:10 )             38.5     12-13    136  |  100  |  21[H]  ----------------------------<  186[H]  4.4   |  33[H]  |  0.71    Ca    8.9      13 Dec 2024 07:10  Phos  3.5     12-13  Mg     2.1     12-13    TPro  7.0  /  Alb  3.2[L]  /  TBili  0.5  /  DBili  x   /  AST  25  /  ALT  20  /  AlkPhos  138[H]  12-13      Urinalysis Basic - ( 13 Dec 2024 07:10 )    Color: x / Appearance: x / SG: x / pH: x  Gluc: 186 mg/dL / Ketone: x  / Bili: x / Urobili: x   Blood: x / Protein: x / Nitrite: x   Leuk Esterase: x / RBC: x / WBC x   Sq Epi: x / Non Sq Epi: x / Bacteria: x      CAPILLARY BLOOD GLUCOSE            RADIOLOGY & ADDITIONAL TESTS:    Imaging Personally Reviewed:  YES/NO    Consultant(s) Notes Reviewed:   YES/ No    Care Discussed with Consultants :     Plan of care was discussed with patient and /or primary care giver; all questions and concerns were addressed and care was aligned with patient's wishes.     NP Note discussed with  Primary Attending    Patient is a 89y old  Female who presents with a chief complaint of Chronic obstructive pulmonary disease with acute exacerbation     (13 Dec 2024 11:56)      INTERVAL HPI/OVERNIGHT EVENTS:  89 yo F, w/ morbid obesity, ambulates with walker, with PMHx of HTN, HLD, COPD (never smoker, on 2L O2 at home), BIPAP at night , HFpEF (EF 55-60%), severe RV, Afib on Eliquis, Pulmonary HTN, presents with 1.5 weeks history of dry cough, SOB at rest and on exertion that has progressively worsened. Patient developed fever, chills, body aches. Patient endorses sick contact with daughter who might currently have the flu. Patient did not receive the flu vaccine this season. Admitted to telemetry for NSTEMI, COPD exacerbation found to have influenza    CXR showed No acute infiltrate. Cardiomegaly. CT chest showed No pneumonia. 5 mm right upper lobe nodule appears new as described above. 6-12 month follow-up noncontrast chest CT can be performed for further evaluation as clinically warranted.  Pulmonology following, started on IV abx and steroid IV. Cardiology following, with mild elevation on troponin    Pt is awake, alert, oriented x 3, no SOB, reported feeling better    MEDICATIONS  (STANDING):  anastrozole 1 milliGRAM(s) Oral daily  apixaban 5 milliGRAM(s) Oral every 12 hours  atorvastatin 10 milliGRAM(s) Oral at bedtime  azithromycin  IVPB 500 milliGRAM(s) IV Intermittent every 24 hours  carvedilol 12.5 milliGRAM(s) Oral every 12 hours  cefTRIAXone   IVPB 1000 milliGRAM(s) IV Intermittent every 24 hours  famotidine    Tablet 20 milliGRAM(s) Oral two times a day  fluticasone propionate/ salmeterol 100-50 MICROgram(s) Diskus 1 Dose(s) Inhalation two times a day  furosemide    Tablet 40 milliGRAM(s) Oral daily  hydrALAZINE 25 milliGRAM(s) Oral every 8 hours  ketorolac   Injectable 15 milliGRAM(s) IV Push once  lidocaine   4% Patch 1 Patch Transdermal every 24 hours  methylPREDNISolone sodium succinate Injectable 40 milliGRAM(s) IV Push every 12 hours  montelukast 10 milliGRAM(s) Oral daily  oseltamivir 75 milliGRAM(s) Oral two times a day  tiotropium 2.5 MICROgram(s) Inhaler 2 Puff(s) Inhalation daily    MEDICATIONS  (PRN):  acetaminophen     Tablet .. 650 milliGRAM(s) Oral every 6 hours PRN Temp greater or equal to 38C (100.4F), Mild Pain (1 - 3)  albuterol    90 MICROgram(s) HFA Inhaler 2 Puff(s) Inhalation every 6 hours PRN for bronchospasm  ondansetron Injectable 4 milliGRAM(s) IV Push every 8 hours PRN Nausea and/or Vomiting  traMADol 25 milliGRAM(s) Oral every 8 hours PRN Severe Pain (7 - 10)      __________________________________________________  REVIEW OF SYSTEMS:    CONSTITUTIONAL: No fever,   EYES: no acute visual disturbances  NECK: No pain or stiffness  RESPIRATORY: No cough; No shortness of breath  CARDIOVASCULAR: No chest pain, no palpitations  GASTROINTESTINAL: No pain. No nausea or vomiting; No diarrhea   NEUROLOGICAL: No headache or numbness, no tremors  MUSCULOSKELETAL: + right knee pain  GENITOURINARY: no dysuria, no frequency, no hesitancy  ALL OTHER  ROS negative        Vital Signs Last 24 Hrs  T(C): 36.9 (13 Dec 2024 11:02), Max: 36.9 (13 Dec 2024 11:02)  T(F): 98.4 (13 Dec 2024 11:02), Max: 98.4 (13 Dec 2024 11:02)  HR: 82 (13 Dec 2024 11:02) (59 - 85)  BP: 135/97 (13 Dec 2024 11:02) (135/97 - 163/108)  BP(mean): --  RR: 18 (13 Dec 2024 11:02) (18 - 19)  SpO2: 97% (13 Dec 2024 11:02) (95% - 100%)    Parameters below as of 13 Dec 2024 11:02  Patient On (Oxygen Delivery Method): room air        ________________________________________________  PHYSICAL EXAM:  GENERAL: NAD  HEENT: Normocephalic; moist mucous membranes;   NECK : supple  CHEST/LUNG: Clear to auscultation bilaterally with good air entry   HEART: S1 S2  regular;  ABDOMEN: Soft, Nontender, Nondistended; Bowel sounds present  EXTREMITIES: no cyanosis; no edema; no calf tenderness  SKIN: warm and dry; no rash  NERVOUS SYSTEM:  Awake and alert; Oriented to place, person and time    _________________________________________________  LABS:                        12.4   7.63  )-----------( 177      ( 13 Dec 2024 07:10 )             38.5     12-13    136  |  100  |  21[H]  ----------------------------<  186[H]  4.4   |  33[H]  |  0.71    Ca    8.9      13 Dec 2024 07:10  Phos  3.5     12-13  Mg     2.1     12-13    TPro  7.0  /  Alb  3.2[L]  /  TBili  0.5  /  DBili  x   /  AST  25  /  ALT  20  /  AlkPhos  138[H]  12-13      Urinalysis Basic - ( 13 Dec 2024 07:10 )    Color: x / Appearance: x / SG: x / pH: x  Gluc: 186 mg/dL / Ketone: x  / Bili: x / Urobili: x   Blood: x / Protein: x / Nitrite: x   Leuk Esterase: x / RBC: x / WBC x   Sq Epi: x / Non Sq Epi: x / Bacteria: x      CAPILLARY BLOOD GLUCOSE            RADIOLOGY & ADDITIONAL TESTS:  < from: CT Chest No Cont (12.12.24 @ 13:14) >    ACC: [id number] EXAM:  CT CHEST   ORDERED BY: RAMBO WILLIAMSON     PROCEDURE DATE:  12/12/2024          INTERPRETATION:  INDICATION: Pneumonia    Axial CT images of the chest are obtained without intravenous   administration of contrast.    COMPARISON: Chest CT of January 31, 2023    Thyroid gland is heterogeneous with asymmetric enlargement of the left   lobe as on the prior study.    LYMPH NODES: No lymphadenopathy.    VASCULATURE: Cardiomegaly with biatrial dilation. Aortic root and valve   calcifications. Aortic intimal calcifications. No pleural or pericardial   effusions.    UPPER ABDOMEN: Colonic diverticulosis. Right renal cyst.    LUNGS: Respiratory motion artifact limits evaluation of the lung   parenchyma. 5 mm right upper lobe nodule appears new since the chest CT   of July 28, 2022. Accurate comparison with the use 30/1/2023 is limited   due to superimposed fair amount of respiratory motion artifact on the   prior study. No pneumonia.    Biapical scarring. Slightly asymmetricleft upper lobe apical opacity,   part of which measures about 1 cm on image 29 of series 3 is without   significant interval change since the prior study also likely related to   the apical scarring.    CHEST WALL: Bony degenerative changes with involvement of the spine and   the shoulders.    IMPRESSION:    No pneumonia.    5 mm right upper lobe nodule appears new as described above. 6-12 month   follow-up noncontrast chest CT can be performed for further evaluation as   clinically warranted.    --- End of Report ---            VEENA MNEESES MD; Attending Radiologist  This document has been electronically signed. Dec 12 2024  2:28PM    < end of copied text >    Imaging Personally Reviewed:  YES/NO    Consultant(s) Notes Reviewed:   YES/ No    Care Discussed with Consultants :     Plan of care was discussed with patient and /or primary care giver; all questions and concerns were addressed and care was aligned with patient's wishes.

## 2024-12-13 NOTE — DIETITIAN INITIAL EVALUATION ADULT - PROBLEM SELECTOR PLAN 2
Hx of COPD (never smoker, on 2L O2 at home), BIPAP at night  Currently on Trellegy Ellipta 100mcg-62,5mcg  QD, Albuterol PRN   Presents with 1.5 weeks history  dry cough, SOB at rest and on exertion that has progressively worsened.   - O2 support as needed, maintain O2 sat 88-92%, avoid over oxygenation  - solumedrol 40q8, taper down  - Started on Duoneb, Symbicort,, Albuterol prn  - Azithromycin 500qd  - Incentive spirometer

## 2024-12-13 NOTE — DISCHARGE NOTE PROVIDER - CARE PROVIDER_API CALL
Rito Son  Pulmonary Disease  [address] [place] [id number]  Phone: [phone]  Fax: [phone]  Follow Up Time: 2 weeks    Kristine Siu  Cardiology  [address] [place] [address]  Phone: [phone]  Fax: [phone]  Follow Up Time: 2 weeks

## 2024-12-13 NOTE — PROGRESS NOTE ADULT - SUBJECTIVE AND OBJECTIVE BOX
Date of Service 12-13-24 @ 10:21    CHIEF COMPLAINT:Patient is a 89y old  Female who presents with a chief complaint of Influenza, COPD  exacerbation .Pt with knee pain.    	  REVIEW OF SYSTEMS:  CONSTITUTIONAL: No fever, weight loss, or fatigue  EYES: No eye pain, visual disturbances, or discharge  ENT:  No difficulty hearing, tinnitus, vertigo; No sinus or throat pain  NECK: No pain or stiffness  RESPIRATORY: No cough, wheezing, chills or hemoptysis; No Shortness of Breath  CARDIOVASCULAR: No chest pain, palpitations, passing out, dizziness, or leg swelling  GASTROINTESTINAL: No abdominal or epigastric pain. No nausea, vomiting, or hematemesis; No diarrhea or constipation. No melena or hematochezia.  GENITOURINARY: No dysuria, frequency, hematuria, or incontinence  NEUROLOGICAL: No headaches, memory loss, loss of strength, numbness, or tremors  SKIN: No itching, burning, rashes, or lesions   LYMPH Nodes: No enlarged glands  ENDOCRINE: No heat or cold intolerance; No hair loss  MUSCULOSKELETAL: No joint pain or swelling; No muscle, back, + extremity pain  PSYCHIATRIC: No depression, anxiety, mood swings, or difficulty sleeping  HEME/LYMPH: No easy bruising, or bleeding gums  ALLERGY AND IMMUNOLOGIC: No hives or eczema	      PHYSICAL EXAM:  T(C): 36.8 (12-13-24 @ 07:36), Max: 36.8 (12-13-24 @ 07:36)  HR: 68 (12-13-24 @ 07:36) (59 - 85)  BP: 150/99 (12-13-24 @ 07:36) (149/108 - 163/108)  RR: 18 (12-13-24 @ 07:36) (18 - 19)  SpO2: 97% (12-13-24 @ 07:36) (95% - 100%)  Wt(kg): --  I&O's Summary    12 Dec 2024 07:01  -  13 Dec 2024 07:00  --------------------------------------------------------  IN: 450 mL / OUT: 1600 mL / NET: -1150 mL    13 Dec 2024 07:01  -  13 Dec 2024 10:21  --------------------------------------------------------  IN: 220 mL / OUT: 0 mL / NET: 220 mL        Appearance: Normal	  HEENT:   Normal oral mucosa, PERRL, EOMI	  Lymphatic: No lymphadenopathy  Cardiovascular: Normal S1 S2, No JVD, No murmurs, No edema  Respiratory: Lungs clear to auscultation	  Psychiatry: A & O x 3, Mood & affect appropriate  Gastrointestinal:  Soft, Non-tender, + BS	  Skin: No rashes, No ecchymoses, No cyanosis	  Neurologic: Non-focal  Extremities: Normal range of motion, No clubbing, cyanosis or edema  Vascular: Peripheral pulses palpable 2+ bilaterally    MEDICATIONS  (STANDING):  anastrozole 1 milliGRAM(s) Oral daily  apixaban 5 milliGRAM(s) Oral every 12 hours  atorvastatin 10 milliGRAM(s) Oral at bedtime  azithromycin  IVPB 500 milliGRAM(s) IV Intermittent every 24 hours  carvedilol 12.5 milliGRAM(s) Oral every 12 hours  cefTRIAXone   IVPB 1000 milliGRAM(s) IV Intermittent every 24 hours  famotidine    Tablet 20 milliGRAM(s) Oral two times a day  fluticasone propionate/ salmeterol 100-50 MICROgram(s) Diskus 1 Dose(s) Inhalation two times a day  furosemide    Tablet 40 milliGRAM(s) Oral daily  hydrALAZINE 25 milliGRAM(s) Oral every 8 hours  lidocaine   4% Patch 1 Patch Transdermal every 24 hours  methylPREDNISolone sodium succinate Injectable 40 milliGRAM(s) IV Push every 12 hours  montelukast 10 milliGRAM(s) Oral daily  oseltamivir 75 milliGRAM(s) Oral two times a day  tiotropium 2.5 MICROgram(s) Inhaler 2 Puff(s) Inhalation daily      TELEMETRY: afib 60's	      	  LABS:	 	    Troponin I, High Sensitivity Result: 41.9 ng/L (12-12 @ 07:43)  Troponin I, High Sensitivity Result: 72.1 ng/L (12-11 @ 16:10)  Troponin I, High Sensitivity Result: 61.2 ng/L (12-11 @ 09:50)                            12.4   7.63  )-----------( 177      ( 13 Dec 2024 07:10 )             38.5     12-13    136  |  100  |  21[H]  ----------------------------<  186[H]  4.4   |  33[H]  |  0.71    Ca    8.9      13 Dec 2024 07:10  Phos  3.5     12-13  Mg     2.1     12-13    TPro  7.0  /  Alb  3.2[L]  /  TBili  0.5  /  DBili  x   /  AST  25  /  ALT  20  /  AlkPhos  138[H]  12-13      	    < from: CT Chest No Cont (12.12.24 @ 13:14) >    ACC: [id number] EXAM:  CT CHEST   ORDERED BY: RAMBO WILLIAMSON     PROCEDURE DATE:  12/12/2024          INTERPRETATION:  INDICATION: Pneumonia    Axial CT images of the chest are obtained without intravenous   administration of contrast.    COMPARISON: Chest CT of January 31, 2023    Thyroid gland is heterogeneous with asymmetric enlargement of the left   lobe as on the prior study.    LYMPH NODES: No lymphadenopathy.    VASCULATURE: Cardiomegaly with biatrial dilation. Aortic root and valve   calcifications. Aortic intimal calcifications. No pleural or pericardial   effusions.    UPPER ABDOMEN: Colonic diverticulosis. Right renal cyst.    LUNGS: Respiratory motion artifact limits evaluation of the lung   parenchyma. 5 mm right upper lobe nodule appears new since the chest CT   of July 28, 2022. Accurate comparison with the use 30/1/2023 is limited   due to superimposed fair amount of respiratory motion artifact on the   prior study. No pneumonia.    Biapical scarring. Slightly asymmetricleft upper lobe apical opacity,   part of which measures about 1 cm on image 29 of series 3 is without   significant interval change since the prior study also likely related to   the apical scarring.    CHEST WALL: Bony degenerative changes with involvement of the spine and   the shoulders.    IMPRESSION:    No pneumonia.    5 mm right upper lobe nodule appears new as described above. 6-12 month   follow-up noncontrast chest CT can be performed for further evaluation as   clinically warranted.    --- End of Report ---            VEENA MENESES MD; Attending Radiologist  This document has been electronically signed. Dec 12 2024  2:28PM

## 2024-12-13 NOTE — PROGRESS NOTE ADULT - PROBLEM SELECTOR PLAN 7
- Hx of HTN on Carvedilol 6.25mg BID, Lasix 40mg QD   - c/w home meds with parameters.  - carvedilol increased to 12.5mg BID  - start hydralazine for better BP control - Hx of HFpEF (EF 55-60%), severe pulm HTN  - on Carvedilol 6.25mg BID, Lasix 40mg QD at home  - c/w home meds

## 2024-12-13 NOTE — DISCHARGE NOTE PROVIDER - HOSPITAL COURSE
89 yo F, w/ morbid obesity, ambulates with walker, with PMHx of HTN, HLD, COPD (never smoker, on 2L O2 at home), BIPAP at night , HFpEF (EF 55-60%), severe RV, Afib on Eliquis, Pulmonary HTN, presents with 1.5 weeks history of dry cough, SOB at rest and on exertion that has progressively worsened. Patient developed fever, chills, body aches. Patient endorses sick contact with daughter who might currently have the flu. Patient did not receive the flu vaccine this season. Admitted to telemetry for NSTEMI, COPD exacerbation found to have influenza    CXR showed No acute infiltrate. Cardiomegaly. CT chest showed No pneumonia. 5 mm right upper lobe nodule appears new as described above. 6-12 month follow-up noncontrast chest CT can be performed for further evaluation as clinically warranted.  Pulmonology following, started on IV abx and steroid IV. Cardiology following, with mild elevation on troponin    Pt is medically optimized for discharge, discussed with the attending  This is just a brief hospital course, please refer to the progress notes for detailed information 89 yo F, w/ morbid obesity, ambulates with walker, with PMHx of HTN, HLD, COPD (never smoker, on 2L O2 at home), BIPAP at night , HFpEF (EF 55-60%), severe RV, Afib on Eliquis, Pulmonary HTN, presents with 1.5 weeks history of dry cough, SOB at rest and on exertion that has progressively worsened. Patient developed fever, chills, body aches. Patient endorses sick contact with daughter who might currently have the flu. Patient did not receive the flu vaccine this season. Admitted to telemetry for NSTEMI, COPD exacerbation found to have influenza. Hospital course was complicated with ESBL Kleb UTI. Antibiotics was switched to Ertapenem. Patient was also noted on 12/11 with C-ag negative bacteremia, likely contaminant. Repeat bcx on 12/15 showing NGTD.     CXR showed No acute infiltrate. Cardiomegaly. CT chest showed No pneumonia. 5 mm right upper lobe nodule appears new as described above. 6-12 month follow-up noncontrast chest CT can be performed for further evaluation as clinically warranted.  Pulmonology following, started on IV abx and steroid IV. Cardiology following, with mild elevation on troponin    Pt is medically optimized for discharge, discussed with the attending  This is just a brief hospital course, please refer to the progress notes for detailed information 89 yo F, w/ morbid obesity, ambulates with walker, with PMHx of HTN, HLD, COPD (never smoker, on 2L O2 at home), BIPAP at night , HFpEF (EF 55-60%), severe RV, Afib on Eliquis, Pulmonary HTN, presents with 1.5 weeks history of dry cough, SOB at rest and on exertion that has progressively worsened. Patient developed fever, chills, body aches. Patient endorses sick contact with daughter who might currently have the flu. Patient did not receive the flu vaccine this season. Admitted to telemetry for NSTEMI, COPD exacerbation found to have influenza. Hospital course was complicated with ESBL Kleb UTI. Antibiotics was switched to Ertapenem & completed a 5 day course. Patient was also noted on 12/11 with Co-ag negative bacteremia, likely contaminant. Repeat bcx on 12/15 showing NGTD.     CXR showed No acute infiltrate. Cardiomegaly. CT chest showed No pneumonia. 5 mm right upper lobe nodule appears new as described above. 6-12 month follow-up noncontrast chest CT can be performed for further evaluation as clinically warranted.  Pulmonology following, started on IV abx and steroid IV. Cardiology following, with mild elevation on troponin    Pt is medically optimized for discharge, discussed with the attending  This is just a brief hospital course, please refer to the progress notes for detailed information

## 2024-12-13 NOTE — PROGRESS NOTE ADULT - PROBLEM SELECTOR PLAN 10
pt is from home  on steroid taper  likely DC over the weekend on steroid taper and with better BP control DVT ppx: Eliquis  GI: famotidine

## 2024-12-13 NOTE — PROGRESS NOTE ADULT - PROBLEM SELECTOR PLAN 6
- Hx of HFpEF (EF 55-60%), severe pulm HTN  - on Carvedilol 6.25mg BID, Lasix 40mg QD at home  - c/w home meds - Hx of Afib on Eliquis 5mg BID and coreg  - c/w home meds

## 2024-12-14 LAB
-  AMPICILLIN/SULBACTAM: SIGNIFICANT CHANGE UP
-  AMPICILLIN: SIGNIFICANT CHANGE UP
-  AZTREONAM: SIGNIFICANT CHANGE UP
-  CEFAZOLIN: SIGNIFICANT CHANGE UP
-  CEFEPIME: SIGNIFICANT CHANGE UP
-  CEFTRIAXONE: SIGNIFICANT CHANGE UP
-  CEFUROXIME: SIGNIFICANT CHANGE UP
-  CIPROFLOXACIN: SIGNIFICANT CHANGE UP
-  ERTAPENEM: SIGNIFICANT CHANGE UP
-  GENTAMICIN: SIGNIFICANT CHANGE UP
-  IMIPENEM: SIGNIFICANT CHANGE UP
-  LEVOFLOXACIN: SIGNIFICANT CHANGE UP
-  MEROPENEM: SIGNIFICANT CHANGE UP
-  NITROFURANTOIN: SIGNIFICANT CHANGE UP
-  PIPERACILLIN/TAZOBACTAM: SIGNIFICANT CHANGE UP
-  TOBRAMYCIN: SIGNIFICANT CHANGE UP
-  TRIMETHOPRIM/SULFAMETHOXAZOLE: SIGNIFICANT CHANGE UP
CULTURE RESULTS: ABNORMAL
CULTURE RESULTS: ABNORMAL
METHOD TYPE: SIGNIFICANT CHANGE UP
ORGANISM # SPEC MICROSCOPIC CNT: ABNORMAL
SPECIMEN SOURCE: SIGNIFICANT CHANGE UP
SPECIMEN SOURCE: SIGNIFICANT CHANGE UP

## 2024-12-14 RX ORDER — ERTAPENEM 1 G/1
1000 INJECTION, POWDER, LYOPHILIZED, FOR SOLUTION INTRAMUSCULAR; INTRAVENOUS EVERY 24 HOURS
Refills: 0 | Status: DISCONTINUED | OUTPATIENT
Start: 2024-12-14 | End: 2024-12-14

## 2024-12-14 RX ORDER — ERTAPENEM 1 G/1
1000 INJECTION, POWDER, LYOPHILIZED, FOR SOLUTION INTRAMUSCULAR; INTRAVENOUS EVERY 24 HOURS
Refills: 0 | Status: COMPLETED | OUTPATIENT
Start: 2024-12-14 | End: 2024-12-16

## 2024-12-14 RX ADMIN — MONTELUKAST SODIUM 10 MILLIGRAM(S): 10 TABLET ORAL at 13:35

## 2024-12-14 RX ADMIN — Medication 40 MILLIGRAM(S): at 06:54

## 2024-12-14 RX ADMIN — ERTAPENEM 120 MILLIGRAM(S): 1 INJECTION, POWDER, LYOPHILIZED, FOR SOLUTION INTRAMUSCULAR; INTRAVENOUS at 19:05

## 2024-12-14 RX ADMIN — APIXABAN 5 MILLIGRAM(S): 2.5 TABLET, FILM COATED ORAL at 06:58

## 2024-12-14 RX ADMIN — CARVEDILOL 12.5 MILLIGRAM(S): 25 TABLET, FILM COATED ORAL at 19:10

## 2024-12-14 RX ADMIN — Medication 75 MILLIGRAM(S): at 19:05

## 2024-12-14 RX ADMIN — ANASTROZOLE 1 MILLIGRAM(S): 1 TABLET, COATED ORAL at 13:35

## 2024-12-14 RX ADMIN — FAMOTIDINE 20 MILLIGRAM(S): 20 TABLET, FILM COATED ORAL at 19:06

## 2024-12-14 RX ADMIN — CARVEDILOL 12.5 MILLIGRAM(S): 25 TABLET, FILM COATED ORAL at 06:54

## 2024-12-14 RX ADMIN — Medication 2 PUFF(S): at 22:17

## 2024-12-14 RX ADMIN — HYDRALAZINE HYDROCHLORIDE 25 MILLIGRAM(S): 10 TABLET ORAL at 06:54

## 2024-12-14 RX ADMIN — FAMOTIDINE 20 MILLIGRAM(S): 20 TABLET, FILM COATED ORAL at 06:54

## 2024-12-14 RX ADMIN — LIDOCAINE 1 PATCH: 40 CREAM TOPICAL at 22:52

## 2024-12-14 RX ADMIN — Medication 250 MILLIGRAM(S): at 07:43

## 2024-12-14 RX ADMIN — LIDOCAINE 1 PATCH: 40 CREAM TOPICAL at 00:58

## 2024-12-14 RX ADMIN — APIXABAN 5 MILLIGRAM(S): 2.5 TABLET, FILM COATED ORAL at 19:05

## 2024-12-14 RX ADMIN — LIDOCAINE 1 PATCH: 40 CREAM TOPICAL at 09:49

## 2024-12-14 RX ADMIN — FLUTICASONE PROPIONATE AND SALMETEROL XINAFOATE 1 DOSE(S): 45; 21 AEROSOL, METERED RESPIRATORY (INHALATION) at 22:18

## 2024-12-14 RX ADMIN — LIDOCAINE 1 PATCH: 40 CREAM TOPICAL at 22:53

## 2024-12-14 RX ADMIN — Medication 40 MILLIGRAM(S): at 19:04

## 2024-12-14 RX ADMIN — HYDRALAZINE HYDROCHLORIDE 25 MILLIGRAM(S): 10 TABLET ORAL at 22:16

## 2024-12-14 RX ADMIN — AZITHROMYCIN 255 MILLIGRAM(S): 250 TABLET, FILM COATED ORAL at 06:56

## 2024-12-14 RX ADMIN — Medication 75 MILLIGRAM(S): at 06:54

## 2024-12-14 RX ADMIN — FLUTICASONE PROPIONATE AND SALMETEROL XINAFOATE 1 DOSE(S): 45; 21 AEROSOL, METERED RESPIRATORY (INHALATION) at 09:47

## 2024-12-14 RX ADMIN — FUROSEMIDE 40 MILLIGRAM(S): 40 TABLET ORAL at 06:54

## 2024-12-14 RX ADMIN — Medication 10 MILLIGRAM(S): at 22:17

## 2024-12-14 RX ADMIN — Medication 100 MILLIGRAM(S): at 06:56

## 2024-12-14 RX ADMIN — HYDRALAZINE HYDROCHLORIDE 25 MILLIGRAM(S): 10 TABLET ORAL at 13:36

## 2024-12-14 RX ADMIN — Medication 2 PUFF(S): at 13:33

## 2024-12-14 NOTE — PROGRESS NOTE ADULT - SUBJECTIVE AND OBJECTIVE BOX
Patient was seen and examined  Patient is a 89y old  Female who presents with a chief complaint of Influenza, COPD  exacerbation (14 Dec 2024 11:05)      INTERVAL HPI/OVERNIGHT EVENTS:  T(C): 36.5 (12-14-24 @ 11:22), Max: 37.1 (12-13-24 @ 16:16)  HR: 80 (12-14-24 @ 11:22) (67 - 88)  BP: 127/72 (12-14-24 @ 11:22) (127/72 - 158/100)  RR: 18 (12-14-24 @ 11:22) (18 - 18)  SpO2: 97% (12-14-24 @ 11:22) (96% - 100%)  Wt(kg): --  I&O's Summary    13 Dec 2024 07:01  -  14 Dec 2024 07:00  --------------------------------------------------------  IN: 450 mL / OUT: 2200 mL / NET: -1750 mL        LABS:                        12.4   7.63  )-----------( 177      ( 13 Dec 2024 07:10 )             38.5     12-13    136  |  100  |  21[H]  ----------------------------<  186[H]  4.4   |  33[H]  |  0.71    Ca    8.9      13 Dec 2024 07:10  Phos  3.5     12-13  Mg     2.1     12-13    TPro  7.0  /  Alb  3.2[L]  /  TBili  0.5  /  DBili  x   /  AST  25  /  ALT  20  /  AlkPhos  138[H]  12-13      Urinalysis Basic - ( 13 Dec 2024 07:10 )    Color: x / Appearance: x / SG: x / pH: x  Gluc: 186 mg/dL / Ketone: x  / Bili: x / Urobili: x   Blood: x / Protein: x / Nitrite: x   Leuk Esterase: x / RBC: x / WBC x   Sq Epi: x / Non Sq Epi: x / Bacteria: x      CAPILLARY BLOOD GLUCOSE              Urinalysis Basic - ( 13 Dec 2024 07:10 )    Color: x / Appearance: x / SG: x / pH: x  Gluc: 186 mg/dL / Ketone: x  / Bili: x / Urobili: x   Blood: x / Protein: x / Nitrite: x   Leuk Esterase: x / RBC: x / WBC x   Sq Epi: x / Non Sq Epi: x / Bacteria: x        MEDICATIONS  (STANDING):  anastrozole 1 milliGRAM(s) Oral daily  apixaban 5 milliGRAM(s) Oral every 12 hours  atorvastatin 10 milliGRAM(s) Oral at bedtime  carvedilol 12.5 milliGRAM(s) Oral every 12 hours  famotidine    Tablet 20 milliGRAM(s) Oral two times a day  fluticasone propionate/ salmeterol 100-50 MICROgram(s) Diskus 1 Dose(s) Inhalation two times a day  furosemide    Tablet 40 milliGRAM(s) Oral daily  hydrALAZINE 25 milliGRAM(s) Oral every 8 hours  lidocaine   4% Patch 1 Patch Transdermal every 24 hours  methylPREDNISolone sodium succinate Injectable 40 milliGRAM(s) IV Push every 12 hours  montelukast 10 milliGRAM(s) Oral daily  oseltamivir 75 milliGRAM(s) Oral two times a day  tiotropium 2.5 MICROgram(s) Inhaler 2 Puff(s) Inhalation daily    MEDICATIONS  (PRN):  acetaminophen     Tablet .. 650 milliGRAM(s) Oral every 6 hours PRN Temp greater or equal to 38C (100.4F), Mild Pain (1 - 3)  albuterol    90 MICROgram(s) HFA Inhaler 2 Puff(s) Inhalation every 6 hours PRN for bronchospasm  ondansetron Injectable 4 milliGRAM(s) IV Push every 8 hours PRN Nausea and/or Vomiting  traMADol 25 milliGRAM(s) Oral every 8 hours PRN Severe Pain (7 - 10)      RADIOLOGY & ADDITIONAL TESTS:    Imaging Personally Reviewed:  [ ] YES  [ ] NO    REVIEW OF SYSTEMS:  CONSTITUTIONAL: No fever, weight loss, or fatigue  EYES: No eye pain, visual disturbances, or discharge  ENMT:  No difficulty hearing, tinnitus, vertigo; No sinus or throat pain  NECK: No pain or stiffness  BREASTS: No pain, masses, or nipple discharge  RESPIRATORY: No cough, wheezing, chills or hemoptysis; No shortness of breath  CARDIOVASCULAR: No chest pain, palpitations, dizziness, or leg swelling  GASTROINTESTINAL: No abdominal or epigastric pain. No nausea, vomiting, or hematemesis; No diarrhea or constipation. No melena or hematochezia.  GENITOURINARY: No dysuria, frequency, hematuria, or incontinence  NEUROLOGICAL: No headaches, memory loss, loss of strength, numbness, or tremors  SKIN: No itching, burning, rashes, or lesions   LYMPH NODES: No enlarged glands  ENDOCRINE: No heat or cold intolerance; No hair loss  MUSCULOSKELETAL: No joint pain or swelling; No muscle, back, or extremity pain  PSYCHIATRIC: No depression, anxiety, mood swings, or difficulty sleeping  HEME/LYMPH: No easy bruising, or bleeding gums  ALLERY AND IMMUNOLOGIC: No hives or eczema      Consultant(s) Notes Reviewed:  [ x ] YES  [ ] NO    PHYSICAL EXAM:  GENERAL: NAD, well-groomed, well-developed  HEAD:  Atraumatic, Normocephalic  EYES: EOMI, PERRLA, conjunctiva and sclera clear  ENMT: No tonsillar erythema, exudates, or enlargement; Moist mucous membranes, Good dentition, No lesions  NECK: Supple, No JVD, Normal thyroid  NERVOUS SYSTEM:  Alert & Oriented X3, Good concentration; Motor Strength 5/5 B/L upper and lower extremities; DTRs 2+ intact and symmetric  CHEST/LUNG: Clear to percussion bilaterally; No rales, rhonchi, wheezing, or rubs  HEART: Regular rate and rhythm; No murmurs, rubs, or gallops  ABDOMEN: Soft, Nontender, Nondistended; Bowel sounds present  EXTREMITIES:  2+ Peripheral Pulses, No clubbing, cyanosis, or edema  LYMPH: No lymphadenopathy noted  SKIN: No rashes or lesions    Care Discussed with Consultants/Other Providers [ x] YES  [ ] NO

## 2024-12-14 NOTE — PROGRESS NOTE ADULT - SUBJECTIVE AND OBJECTIVE BOX
Patient is a 89y old  Female who presents with a chief complaint of Influenza, COPD  exacerbation (14 Dec 2024 10:17)  Awake, alert, comfortable in bed in NAD. Doing well on RA. Not wheezing    INTERVAL HPI/OVERNIGHT EVENTS:      VITAL SIGNS:  T(F): 97.3 (12-14-24 @ 07:34)  HR: 75 (12-14-24 @ 07:34)  BP: 154/93 (12-14-24 @ 09:46)  RR: 18 (12-14-24 @ 07:34)  SpO2: 100% (12-14-24 @ 07:34)  Wt(kg): --  I&O's Detail    13 Dec 2024 07:01  -  14 Dec 2024 07:00  --------------------------------------------------------  IN:    Oral Fluid: 450 mL  Total IN: 450 mL    OUT:    Voided (mL): 2200 mL  Total OUT: 2200 mL    Total NET: -1750 mL              REVIEW OF SYSTEMS:    CONSTITUTIONAL:  No fevers, chills, sweats    HEENT:  Eyes:  No diplopia or blurred vision. ENT:  No earache, sore throat or runny nose.    CARDIOVASCULAR:  No pressure, squeezing, tightness, or heaviness about the chest; no palpitations.    RESPIRATORY:  Per HPI    GASTROINTESTINAL:  No abdominal pain, nausea, vomiting or diarrhea.    GENITOURINARY:  No dysuria, frequency or urgency.    NEUROLOGIC:  No paresthesias, fasciculations, seizures or weakness.    PSYCHIATRIC:  No disorder of thought or mood.      PHYSICAL EXAM:    Constitutional: Well developed and nourished  Eyes:Perrla  ENMT: normal  Neck:supple  Respiratory: good air entry  Cardiovascular: S1 S2 regular  Gastrointestinal: Soft, Non tender  Extremities: No edema  Vascular:normal  Neurological:Awake, alert,Ox3  Musculoskeletal:Normal      MEDICATIONS  (STANDING):  anastrozole 1 milliGRAM(s) Oral daily  apixaban 5 milliGRAM(s) Oral every 12 hours  atorvastatin 10 milliGRAM(s) Oral at bedtime  carvedilol 12.5 milliGRAM(s) Oral every 12 hours  famotidine    Tablet 20 milliGRAM(s) Oral two times a day  fluticasone propionate/ salmeterol 100-50 MICROgram(s) Diskus 1 Dose(s) Inhalation two times a day  furosemide    Tablet 40 milliGRAM(s) Oral daily  hydrALAZINE 25 milliGRAM(s) Oral every 8 hours  lidocaine   4% Patch 1 Patch Transdermal every 24 hours  methylPREDNISolone sodium succinate Injectable 40 milliGRAM(s) IV Push every 12 hours  montelukast 10 milliGRAM(s) Oral daily  oseltamivir 75 milliGRAM(s) Oral two times a day  tiotropium 2.5 MICROgram(s) Inhaler 2 Puff(s) Inhalation daily    MEDICATIONS  (PRN):  acetaminophen     Tablet .. 650 milliGRAM(s) Oral every 6 hours PRN Temp greater or equal to 38C (100.4F), Mild Pain (1 - 3)  albuterol    90 MICROgram(s) HFA Inhaler 2 Puff(s) Inhalation every 6 hours PRN for bronchospasm  ondansetron Injectable 4 milliGRAM(s) IV Push every 8 hours PRN Nausea and/or Vomiting  traMADol 25 milliGRAM(s) Oral every 8 hours PRN Severe Pain (7 - 10)      Allergies    losartan (Angioedema)    Intolerances    Chicken (Stomach Upset)      LABS:                        12.4   7.63  )-----------( 177      ( 13 Dec 2024 07:10 )             38.5     12-13    136  |  100  |  21[H]  ----------------------------<  186[H]  4.4   |  33[H]  |  0.71    Ca    8.9      13 Dec 2024 07:10  Phos  3.5     12-13  Mg     2.1     12-13    TPro  7.0  /  Alb  3.2[L]  /  TBili  0.5  /  DBili  x   /  AST  25  /  ALT  20  /  AlkPhos  138[H]  12-13      Urinalysis Basic - ( 13 Dec 2024 07:10 )    Color: x / Appearance: x / SG: x / pH: x  Gluc: 186 mg/dL / Ketone: x  / Bili: x / Urobili: x   Blood: x / Protein: x / Nitrite: x   Leuk Esterase: x / RBC: x / WBC x   Sq Epi: x / Non Sq Epi: x / Bacteria: x            CAPILLARY BLOOD GLUCOSE            RADIOLOGY & ADDITIONAL TESTS:    CXR:    Ct scan chest:    ekg;    echo:

## 2024-12-14 NOTE — CONSULT NOTE ADULT - SUBJECTIVE AND OBJECTIVE BOX
Patient is a 89y old  Female  with morbid obesity,  ambulates with walker, with PMH of HTN, HLD, COPD (never smoker, on 2L O2 at home), BIPAP at night , HFpEF (EF 55-60%), severe RV, Afib on Eliquis, Pulmonary HTN, now presents to the ER for evaluation of worsening dry cough, SOB at rest and on exertion, fever, chills, and body aches. Patient endorses sick contact with daughter who might currently have the flu. Patient did not receive the flu vaccine this season. On admission, she found to have Fever, positive Influenza A and positive Urine analysis. She has started on Tamiflu and Azithromycin, and the ID consult requested to assist with further evaluation and antibiotic management.      REVIEW OF SYSTEMS: Total of twelve systems have been reviewed and found to be negative unless mentioned in HPI      PAST MEDICAL & SURGICAL HISTORY:  Arthritis  Legally blind  Pre-diabetes  Breast cancer right, no chemo or radiation  COPD exacerbation  Atrial fibrillation  HF (heart failure), HFpEF 7/29  HTN (hypertension)  Deep vein thrombosis (DVT)  Left Lower Extremity  H/O CHF  HTN (hypertension)  HLD (hyperlipidemia)  COPD exacerbation  CHF, acute  No significant past surgical history      SOCIAL HISTORY  Alcohol: Does not drink  Tobacco: Does not smoke  Illicit substance use: None      FAMILY HISTORY: Non contributory to the present illness        ALLERGIES: losartan (Angioedema)  Chicken (Stomach Upset)        Vital Signs Last 24 Hrs  T(C): 36.3 (14 Dec 2024 07:34), Max: 37.1 (13 Dec 2024 16:16)  T(F): 97.3 (14 Dec 2024 07:34), Max: 98.8 (13 Dec 2024 16:16)  HR: 75 (14 Dec 2024 07:34) (67 - 88)  BP: 145/114 (14 Dec 2024 07:34) (135/97 - 158/100)  BP(mean): --  RR: 18 (14 Dec 2024 07:34) (18 - 18)  SpO2: 100% (14 Dec 2024 07:34) (96% - 100%)    Parameters below as of 14 Dec 2024 07:34  Patient On (Oxygen Delivery Method): room air        PHYSICAL EXAM:  GENERAL: Not in distress   CHEST/LUNG:  Aire ntry bilaterally  HEART: s1 and s2 present  ABDOMEN:  Nontender and  Nondistended  EXTREMITIES: No pedal  edema  CNS: Awake and Alert      LABS:                        12.4   7.63  )-----------( 177      ( 13 Dec 2024 07:10 )             38.5       12-13    136  |  100  |  21[H]  ----------------------------<  186[H]  4.4   |  33[H]  |  0.71    Ca    8.9      13 Dec 2024 07:10  Phos  3.5     12-13  Mg     2.1     12-13    TPro  7.0  /  Alb  3.2[L]  /  TBili  0.5  /  DBili  x   /  AST  25  /  ALT  20  /  AlkPhos  138[H]  12-13      MEDICATIONS  (STANDING):  anastrozole 1 milliGRAM(s) Oral daily  apixaban 5 milliGRAM(s) Oral every 12 hours  atorvastatin 10 milliGRAM(s) Oral at bedtime  azithromycin  IVPB 500 milliGRAM(s) IV Intermittent every 24 hours  carvedilol 12.5 milliGRAM(s) Oral every 12 hours  famotidine    Tablet 20 milliGRAM(s) Oral two times a day  fluticasone propionate/ salmeterol 100-50 MICROgram(s) Diskus 1 Dose(s) Inhalation two times a day  furosemide    Tablet 40 milliGRAM(s) Oral daily  hydrALAZINE 25 milliGRAM(s) Oral every 8 hours  lidocaine   4% Patch 1 Patch Transdermal every 24 hours  methylPREDNISolone sodium succinate Injectable 40 milliGRAM(s) IV Push every 12 hours  montelukast 10 milliGRAM(s) Oral daily  oseltamivir 75 milliGRAM(s) Oral two times a day  tiotropium 2.5 MICROgram(s) Inhaler 2 Puff(s) Inhalation daily  vancomycin  IVPB 1750 milliGRAM(s) IV Intermittent every 12 hours  vancomycin  IVPB        MEDICATIONS  (PRN):  acetaminophen     Tablet .. 650 milliGRAM(s) Oral every 6 hours PRN Temp greater or equal to 38C (100.4F), Mild Pain (1 - 3)  albuterol    90 MICROgram(s) HFA Inhaler 2 Puff(s) Inhalation every 6 hours PRN for bronchospasm  ondansetron Injectable 4 milliGRAM(s) IV Push every 8 hours PRN Nausea and/or Vomiting  traMADol 25 milliGRAM(s) Oral every 8 hours PRN Severe Pain (7 - 10)        RADIOLOGY & ADDITIONAL TESTS:    12/12/24 : CT Chest No Cont (12.12.24 @ 13:14) No pneumonia. 5 mm right upper lobe nodule appears new as described above. 6-12 month   follow-up noncontrast chest CT can be performed for further evaluation as  clinically warranted.      12/11/24 : Xray Chest 1 View- PORTABLE-Urgent (12.11.24 @ 11:37) No acute infiltrate. Cardiomegaly.        MICROBIOLOGY DATA:      Culture - Urine (12.11.24 @ 14:44)   Specimen Source: Clean Catch  Culture Results:   >100,000 CFU/ml Klebsiella pneumoniae    Urine Microscopic-Add On (NC) (12.11.24 @ 14:44)   Bacteria: Too Numerous to count /HPF  Squamous Epithelial Cells: Present  Red Blood Cell - Urine: 25 /HPF  White Blood Cell - Urine: 15 /HPF  Hyaline Casts: Present    Culture - Blood (12.11.24 @ 10:00)   - Coagulase negative Staphylococcus: Detec  Gram Stain:   Growth in anaerobic bottle: Gram Positive Cocci in Clusters  Specimen Source: .Blood BLOOD  Organism: Blood Culture PCR  Culture Results:   Growth in anaerobic bottle: Gram Positive Cocci in Clusters   Direct identification is available within approximately 3-5   hours either by Blood Panel Multiplexed PCR or Direct   MALDI-TOF. Details: [email or web address]  Organism Identification: Blood Culture PCR  Method Type: PCR  Culture - Blood (12.11.24 @ 09:  50)   Specimen Source: .Blood BLOOD  Culture Results:   No growth at 48 Hours    FluA/FluB/RSV/COVID PCR (12.11.24 @ 09:50)   SARS-CoV-2 Result: NotDetec: This Respiratory Panel uses polymerase chain reaction (PCR) to detect for   influenza A; influenza B; and SARS-CoV-2.   This test was validated by [facility] and is in use under the FDA   Emergency Use Authorization (EUA) for clinical labs CLIA-certified to   perform high complexity testing. Test results should be correlated with   clinical presentation, patient history, and epidemiology.

## 2024-12-14 NOTE — PROGRESS NOTE ADULT - SUBJECTIVE AND OBJECTIVE BOX
Date of Service 12-14-24 @ 10:18    CHIEF COMPLAINT:Patient is a 89y old  Female who presents with a chief complaint of Influenza, COPD  exacerbation .Pt appears comfortable.    	  REVIEW OF SYSTEMS:  CONSTITUTIONAL: No fever, weight loss, or fatigue  EYES: No eye pain, visual disturbances, or discharge  ENT:  No difficulty hearing, tinnitus, vertigo; No sinus or throat pain  NECK: No pain or stiffness  RESPIRATORY: No cough, wheezing, chills or hemoptysis; No Shortness of Breath  CARDIOVASCULAR: No chest pain, palpitations, passing out, dizziness, or leg swelling  GASTROINTESTINAL: No abdominal or epigastric pain. No nausea, vomiting, or hematemesis; No diarrhea or constipation. No melena or hematochezia.  GENITOURINARY: No dysuria, frequency, hematuria, or incontinence  NEUROLOGICAL: No headaches, memory loss, loss of strength, numbness, or tremors  SKIN: No itching, burning, rashes, or lesions   LYMPH Nodes: No enlarged glands  ENDOCRINE: No heat or cold intolerance; No hair loss  MUSCULOSKELETAL: No joint pain or swelling; No muscle, back, or extremity pain  PSYCHIATRIC: No depression, anxiety, mood swings, or difficulty sleeping  HEME/LYMPH: No easy bruising, or bleeding gums  ALLERGY AND IMMUNOLOGIC: No hives or eczema	        PHYSICAL EXAM:  T(C): 36.3 (12-14-24 @ 07:34), Max: 37.1 (12-13-24 @ 16:16)  HR: 75 (12-14-24 @ 07:34) (67 - 88)  BP: 154/93 (12-14-24 @ 09:46) (135/97 - 158/100)  RR: 18 (12-14-24 @ 07:34) (18 - 18)  SpO2: 100% (12-14-24 @ 07:34) (96% - 100%)  Wt(kg): --  I&O's Summary    13 Dec 2024 07:01  -  14 Dec 2024 07:00  --------------------------------------------------------  IN: 450 mL / OUT: 2200 mL / NET: -1750 mL        Appearance: Normal	  HEENT:   Normal oral mucosa, PERRL, EOMI	  Lymphatic: No lymphadenopathy  Cardiovascular: Normal S1 S2, No JVD, No murmurs, No edema  Respiratory: Lungs clear to auscultation	  Psychiatry: A & O x 3, Mood & affect appropriate  Gastrointestinal:  Soft, Non-tender, + BS	  Skin: No rashes, No ecchymoses, No cyanosis	  Neurologic: Non-focal  Extremities: Normal range of motion, No clubbing, cyanosis or edema  Vascular: Peripheral pulses palpable 2+ bilaterally    MEDICATIONS  (STANDING):  anastrozole 1 milliGRAM(s) Oral daily  apixaban 5 milliGRAM(s) Oral every 12 hours  atorvastatin 10 milliGRAM(s) Oral at bedtime  carvedilol 12.5 milliGRAM(s) Oral every 12 hours  famotidine    Tablet 20 milliGRAM(s) Oral two times a day  fluticasone propionate/ salmeterol 100-50 MICROgram(s) Diskus 1 Dose(s) Inhalation two times a day  furosemide    Tablet 40 milliGRAM(s) Oral daily  hydrALAZINE 25 milliGRAM(s) Oral every 8 hours  lidocaine   4% Patch 1 Patch Transdermal every 24 hours  methylPREDNISolone sodium succinate Injectable 40 milliGRAM(s) IV Push every 12 hours  montelukast 10 milliGRAM(s) Oral daily  oseltamivir 75 milliGRAM(s) Oral two times a day  tiotropium 2.5 MICROgram(s) Inhaler 2 Puff(s) Inhalation daily      TELEMETRY: afib	    	  	  LABS:	 	    Troponin I, High Sensitivity Result: 41.9 ng/L (12-12 @ 07:43)  Troponin I, High Sensitivity Result: 72.1 ng/L (12-11 @ 16:10)                            12.4   7.63  )-----------( 177      ( 13 Dec 2024 07:10 )             38.5     12-13    136  |  100  |  21[H]  ----------------------------<  186[H]  4.4   |  33[H]  |  0.71    Ca    8.9      13 Dec 2024 07:10  Phos  3.5     12-13  Mg     2.1     12-13    TPro  7.0  /  Alb  3.2[L]  /  TBili  0.5  /  DBili  x   /  AST  25  /  ALT  20  /  AlkPhos  138[H]  12-13      	      Blood cx 1 of 2 coagg- staph.      Culture - Urine (12.11.24 @ 14:44)   - Ertapenem: S <=0.5  - Gentamicin: S <=2  - Imipenem: S <=1  - Levofloxacin: S <=0.5  - Meropenem: S <=1  - Nitrofurantoin: S <=32 Should not be used to treat pyelonephritis  - Piperacillin/Tazobactam: S <=8  - Tobramycin: S <=2  - Trimethoprim/Sulfamethoxazole: R >2/38  - Ampicillin: R >16 These ampicillin results predict results for amoxicillin  - Ampicillin/Sulbactam: I 16/8  - Aztreonam: R 16  - Cefazolin: R >16 For uncomplicated UTI with K. pneumoniae, E. coli, or P. mirablis: NIDIA <=16 is sensitive and NIDIA >=32 is resistant. This also predicts results for oral agents cefaclor, cefdinir, cefpodoxime, cefprozil, cefuroxime axetil, cephalexin and locarbef for uncomplicated UTI. Note that some isolates may be susceptible to these agents while testing resistant to cefazolin.  - Cefepime: R >16  - Ceftriaxone: R >32  - Cefuroxime: R >16  - Ciprofloxacin: R 1  Specimen Source: Clean Catch  Culture Results:   >100,000 CFU/ml Klebsiella pneumoniae ESBL  Organism Identification: Klebsiella pneumoniae ESBL  Organism: Klebsiella pneumoniae ESBL  Method Type: NIDIA   [Normal Breath Sounds] : Normal breath sounds [de-identified] : NAD [de-identified] : anicteric scleare [de-identified] : soft, obese. perc cholecystostomy tube in place, bile draining

## 2024-12-14 NOTE — PROGRESS NOTE ADULT - ASSESSMENT
89 yo F, w/ morbid obesity,  ambulates with walker, with PMH of HTN, HLD, COPD (never smoker, on 2L O2 at home), BIPAP at night , HFpEF (EF 55-60%), Breast ca, Chronic Afib on Eliquis, Pulmonary HTN, presents with 1.5 weeks history  dry cough, SOB at rest and on exertion that has progressively worsened,pneumonia,Flu with borderline troponins,UTI-ESBL,Blood cx-coag - staph probable contaminant.  1.Tele monitoring.  2.Flu-abx.  3.Chronic afib-eliquis,coreg.  4.COPD-MDI,steroids.  5.Breast ca-anastrozole.  6.Rt sided CHF-lasix.  7.Bipap at night.  8.CT chest shows pulm nodule-repeat 6-12 mo.  9.HTN-hydralazine 25mg tid.  10.Knee pain lidocaine patch.  11.UTI-ESBL-abx as per ID.  12.Blood cx+-contaminant,check repeat blood cx.

## 2024-12-14 NOTE — CONSULT NOTE ADULT - ASSESSMENT
Patient is a 89y old  Female  with morbid obesity,  ambulates with walker, with PMH of HTN, HLD, COPD (never smoker, on 2L O2 at home), BIPAP at night , HFpEF (EF 55-60%), severe RV, Afib on Eliquis, Pulmonary HTN, now presents to the ER for evaluation of worsening dry cough, SOB at rest and on exertion, fever, chills, and body aches. Patient endorses sick contact with daughter who might currently have the flu. Patient did not receive the flu vaccine this season. On admission, she found to have Fever, positive Influenza A and positive Urine analysis. She has started on Tamiflu and Azithromycin, and the ID consult requested to assist with further evaluation and antibiotic management.    # Influenza A  # UTI  # Bacteremia - 12/11/24    would recommend:    1. Follow up final Blood culture from 12/11/22  2. Repeat Blood cultures X 2 in AM to document clearing the Blood stream  3. Follow up Urine culture  4. Discontinue Azithromycin and continue Ceftriaxone until urine culture finalized  5. Continue Tamiflu X 5 days  6. Droplet precaution    will follow the patient with you and make further recommendation based on the clinical course and Lab results  Thank you for the opportunity to participate in Ms. LAST's care    Attending Attestation:    Spent more than 65 minutes on total encounter, more than 50 % of the visit was spent counseling and/or coordinating care by the Attending physician.     Patient is a 89y old  Female  with morbid obesity,  ambulates with walker, with PMH of HTN, HLD, COPD (never smoker, on 2L O2 at home), BIPAP at night , HFpEF (EF 55-60%), severe RV, Afib on Eliquis, Pulmonary HTN, now presents to the ER for evaluation of worsening dry cough, SOB at rest and on exertion, fever, chills, and body aches. Patient endorses sick contact with daughter who might currently have the flu. Patient did not receive the flu vaccine this season. On admission, she found to have Fever, positive Influenza A and positive Urine analysis. She has started on Tamiflu and Azithromycin, and the ID consult requested to assist with further evaluation and antibiotic management.    # Influenza A  # UTI  # Bacteremia - 12/11/24    would recommend:    1. Follow up final Blood culture from 12/11/22  2. Repeat Blood cultures X 2 in AM to document clearing the Blood stream  3. Follow up Urine culture  4. Discontinue Azithromycin and continue Ceftriaxone until urine culture finalized  5. Continue Tamiflu X 5 days  6. Droplet precaution    d/w Dr. Siu    will follow the patient with you and make further recommendation based on the clinical course and Lab results  Thank you for the opportunity to participate in Ms. LAST's care    Attending Attestation:    Spent more than 65 minutes on total encounter, more than 50 % of the visit was spent counseling and/or coordinating care by the Attending physician.

## 2024-12-14 NOTE — PROGRESS NOTE ADULT - ASSESSMENT
89 yo F, w/ morbid obesity,  ambulates with walker, with PMH of HTN, HLD, COPD (never smoker, on 2L O2 at home), BIPAP at night , HFpEF (EF 55-60%), Breast ca, Chronic Afib on Eliquis, Pulmonary HTN, presents with 1.5 weeks history  dry cough, SOB at rest and on exertion that has progressively worsened,pneumonia,Flu with borderline troponins,UTI-ESBL,Blood cx-coag - staph probable contaminant.  1.Tele monitoring.  2.Flu-abx.  3.Chronic afib-eliquis,coreg.  4.COPD-MDI,steroids.  5.Breast ca-anastrozole.  6.Rt sided CHF-lasix.  7.Bipap at night.  8.CT chest shows pulm nodule-repeat 6-12 mo.  9.HTN-hydralazine 25mg tid.  10.Knee pain lidocaine patch.  11.UTI-ESBL-abx as per ID.  12.Blood cx+-contaminant,check repeat blood cx.

## 2024-12-15 DIAGNOSIS — N39.0 URINARY TRACT INFECTION, SITE NOT SPECIFIED: ICD-10-CM

## 2024-12-15 LAB
ANION GAP SERPL CALC-SCNC: 8 MMOL/L — SIGNIFICANT CHANGE UP (ref 5–17)
BUN SERPL-MCNC: 19 MG/DL — HIGH (ref 7–18)
CALCIUM SERPL-MCNC: 8.8 MG/DL — SIGNIFICANT CHANGE UP (ref 8.4–10.5)
CHLORIDE SERPL-SCNC: 98 MMOL/L — SIGNIFICANT CHANGE UP (ref 96–108)
CO2 SERPL-SCNC: 31 MMOL/L — SIGNIFICANT CHANGE UP (ref 22–31)
CREAT SERPL-MCNC: 0.74 MG/DL — SIGNIFICANT CHANGE UP (ref 0.5–1.3)
EGFR: 75 ML/MIN/1.73M2 — SIGNIFICANT CHANGE UP
GLUCOSE BLDC GLUCOMTR-MCNC: 147 MG/DL — HIGH (ref 70–99)
GLUCOSE SERPL-MCNC: 164 MG/DL — HIGH (ref 70–99)
HCT VFR BLD CALC: 46.6 % — HIGH (ref 34.5–45)
HGB BLD-MCNC: 15.3 G/DL — SIGNIFICANT CHANGE UP (ref 11.5–15.5)
MCHC RBC-ENTMCNC: 30.7 PG — SIGNIFICANT CHANGE UP (ref 27–34)
MCHC RBC-ENTMCNC: 32.8 G/DL — SIGNIFICANT CHANGE UP (ref 32–36)
MCV RBC AUTO: 93.6 FL — SIGNIFICANT CHANGE UP (ref 80–100)
NRBC # BLD: 0 /100 WBCS — SIGNIFICANT CHANGE UP (ref 0–0)
PLATELET # BLD AUTO: 140 K/UL — LOW (ref 150–400)
POTASSIUM SERPL-MCNC: 3.7 MMOL/L — SIGNIFICANT CHANGE UP (ref 3.5–5.3)
POTASSIUM SERPL-SCNC: 3.7 MMOL/L — SIGNIFICANT CHANGE UP (ref 3.5–5.3)
RBC # BLD: 4.98 M/UL — SIGNIFICANT CHANGE UP (ref 3.8–5.2)
RBC # FLD: 14.3 % — SIGNIFICANT CHANGE UP (ref 10.3–14.5)
SODIUM SERPL-SCNC: 137 MMOL/L — SIGNIFICANT CHANGE UP (ref 135–145)
WBC # BLD: 5.3 K/UL — SIGNIFICANT CHANGE UP (ref 3.8–10.5)
WBC # FLD AUTO: 5.3 K/UL — SIGNIFICANT CHANGE UP (ref 3.8–10.5)

## 2024-12-15 RX ORDER — METHYLPREDNISOLONE SOD SUCC 125 MG
40 VIAL (EA) INJECTION DAILY
Refills: 0 | Status: DISCONTINUED | OUTPATIENT
Start: 2024-12-16 | End: 2024-12-16

## 2024-12-15 RX ORDER — HYDRALAZINE HYDROCHLORIDE 10 MG/1
50 TABLET ORAL EVERY 8 HOURS
Refills: 0 | Status: DISCONTINUED | OUTPATIENT
Start: 2024-12-15 | End: 2024-12-18

## 2024-12-15 RX ADMIN — Medication 10 MILLIGRAM(S): at 22:15

## 2024-12-15 RX ADMIN — Medication 40 MILLIGRAM(S): at 05:57

## 2024-12-15 RX ADMIN — FLUTICASONE PROPIONATE AND SALMETEROL XINAFOATE 1 DOSE(S): 45; 21 AEROSOL, METERED RESPIRATORY (INHALATION) at 22:15

## 2024-12-15 RX ADMIN — CARVEDILOL 12.5 MILLIGRAM(S): 25 TABLET, FILM COATED ORAL at 18:40

## 2024-12-15 RX ADMIN — FLUTICASONE PROPIONATE AND SALMETEROL XINAFOATE 1 DOSE(S): 45; 21 AEROSOL, METERED RESPIRATORY (INHALATION) at 10:54

## 2024-12-15 RX ADMIN — LIDOCAINE 1 PATCH: 40 CREAM TOPICAL at 10:53

## 2024-12-15 RX ADMIN — MONTELUKAST SODIUM 10 MILLIGRAM(S): 10 TABLET ORAL at 12:05

## 2024-12-15 RX ADMIN — Medication 2 PUFF(S): at 12:05

## 2024-12-15 RX ADMIN — HYDRALAZINE HYDROCHLORIDE 25 MILLIGRAM(S): 10 TABLET ORAL at 05:57

## 2024-12-15 RX ADMIN — HYDRALAZINE HYDROCHLORIDE 50 MILLIGRAM(S): 10 TABLET ORAL at 22:15

## 2024-12-15 RX ADMIN — CARVEDILOL 12.5 MILLIGRAM(S): 25 TABLET, FILM COATED ORAL at 05:57

## 2024-12-15 RX ADMIN — FAMOTIDINE 20 MILLIGRAM(S): 20 TABLET, FILM COATED ORAL at 18:40

## 2024-12-15 RX ADMIN — FUROSEMIDE 40 MILLIGRAM(S): 40 TABLET ORAL at 05:54

## 2024-12-15 RX ADMIN — HYDRALAZINE HYDROCHLORIDE 50 MILLIGRAM(S): 10 TABLET ORAL at 14:16

## 2024-12-15 RX ADMIN — ERTAPENEM 120 MILLIGRAM(S): 1 INJECTION, POWDER, LYOPHILIZED, FOR SOLUTION INTRAMUSCULAR; INTRAVENOUS at 18:40

## 2024-12-15 RX ADMIN — LIDOCAINE 1 PATCH: 40 CREAM TOPICAL at 22:18

## 2024-12-15 RX ADMIN — ANASTROZOLE 1 MILLIGRAM(S): 1 TABLET, COATED ORAL at 12:05

## 2024-12-15 RX ADMIN — Medication 75 MILLIGRAM(S): at 05:54

## 2024-12-15 RX ADMIN — Medication 75 MILLIGRAM(S): at 18:40

## 2024-12-15 RX ADMIN — APIXABAN 5 MILLIGRAM(S): 2.5 TABLET, FILM COATED ORAL at 18:40

## 2024-12-15 RX ADMIN — APIXABAN 5 MILLIGRAM(S): 2.5 TABLET, FILM COATED ORAL at 05:57

## 2024-12-15 RX ADMIN — FAMOTIDINE 20 MILLIGRAM(S): 20 TABLET, FILM COATED ORAL at 05:54

## 2024-12-15 NOTE — PROGRESS NOTE ADULT - SUBJECTIVE AND OBJECTIVE BOX
Patient is seen and examined at the bed side, is afebrile. The repeat blood cultures pending collection.,      REVIEW OF SYSTEMS: All other review systems are negative      ALLERGIES: losartan (Angioedema)  Chicken (Stomach Upset)        Vital Signs Last 24 Hrs  T(C): 36.4 (15 Dec 2024 07:34), Max: 36.7 (15 Dec 2024 04:38)  T(F): 97.5 (15 Dec 2024 07:34), Max: 98.1 (15 Dec 2024 04:38)  HR: 114 (15 Dec 2024 07:34) (53 - 114)  BP: 154/105 (15 Dec 2024 07:34) (127/72 - 154/111)  BP(mean): 121 (15 Dec 2024 07:34) (121 - 121)  RR: 20 (15 Dec 2024 07:34) (18 - 20)  SpO2: 95% (15 Dec 2024 07:34) (95% - 97%)    Parameters below as of 15 Dec 2024 07:34  Patient On (Oxygen Delivery Method): room air        PHYSICAL EXAM:  GENERAL: Not in distress   CHEST/LUNG:  Air entry bilaterally  HEART: s1 and s2 present  ABDOMEN:  Nontender and  Nondistended  EXTREMITIES: No pedal  edema  CNS: Awake and Alert      LABS: No new lLabs                          12.4   7.63  )-----------( 177      ( 13 Dec 2024 07:10 )             38.5         12-13    136  |  100  |  21[H]  ----------------------------<  186[H]  4.4   |  33[H]  |  0.71    Ca    8.9      13 Dec 2024 07:10  Phos  3.5     12-13  Mg     2.1     12-13    TPro  7.0  /  Alb  3.2[L]  /  TBili  0.5  /  DBili  x   /  AST  25  /  ALT  20  /  AlkPhos  138[H]  12-13      MEDICATIONS  (STANDING):    anastrozole 1 milliGRAM(s) Oral daily  apixaban 5 milliGRAM(s) Oral every 12 hours  atorvastatin 10 milliGRAM(s) Oral at bedtime  carvedilol 12.5 milliGRAM(s) Oral every 12 hours  ertapenem  IVPB 1000 milliGRAM(s) IV Intermittent every 24 hours  famotidine    Tablet 20 milliGRAM(s) Oral two times a day  fluticasone propionate/ salmeterol 100-50 MICROgram(s) Diskus 1 Dose(s) Inhalation two times a day  furosemide    Tablet 40 milliGRAM(s) Oral daily  hydrALAZINE 50 milliGRAM(s) Oral every 8 hours  lidocaine   4% Patch 1 Patch Transdermal every 24 hours  montelukast 10 milliGRAM(s) Oral daily  oseltamivir 75 milliGRAM(s) Oral two times a day  tiotropium 2.5 MICROgram(s) Inhaler 2 Puff(s) Inhalation daily        RADIOLOGY & ADDITIONAL TESTS:    12/12/24 : CT Chest No Cont (12.12.24 @ 13:14) No pneumonia. 5 mm right upper lobe nodule appears new as described above. 6-12 month   follow-up noncontrast chest CT can be performed for further evaluation as  clinically warranted.      12/11/24 : Xray Chest 1 View- PORTABLE-Urgent (12.11.24 @ 11:37) No acute infiltrate. Cardiomegaly.        MICROBIOLOGY DATA:    miCulture - Urine (12.11.24 @ 14:44)   - Ampicillin: R >16 These ampicillin results predict results for amoxicillin  - Ampicillin/Sulbactam: I 16/8  - Aztreonam: R 16  - Cefazolin: R >16 For uncomplicated UTI with K. pneumoniae, E. coli, or P. mirablis: NIDIA <=16 is sensitive and NIDIA >=32 is resistant. This also predicts results for oral agents cefaclor, cefdinir, cefpodoxime, cefprozil, cefuroxime axetil, cephalexin and locarbef for uncomplicated UTI. Note that some isolates may be susceptible to these agents while testing resistant to cefazolin.  - Cefepime: R >16  - Ceftriaxone: R >32  - Cefuroxime: R >16  - Ciprofloxacin: R 1  - Ertapenem: S <=0.5  - Gentamicin: S <=2  - Imipenem: S <=1  - Levofloxacin: S <=0.5  - Meropenem: S <=1  - Nitrofurantoin: S <=32 Should not be used to treat pyelonephritis  - Piperacillin/Tazobactam: S <=8  - Tobramycin: S <=2  - Trimethoprim/Sulfamethoxazole: R >2/38  Specimen Source: Clean Catch  Culture Results:   >100,000 CFU/ml Klebsiella pneumoniae ESBL  Organism Identification: Klebsiella pneumoniae ESBL  Organism: Klebsiella pneumoniae ESBL  Method Type: NIDIA      Culture - Urine (12.11.24 @ 14:44)   Specimen Source: Clean Catch  Culture Results:   >100,000 CFU/ml Klebsiella pneumoniae    Urine Microscopic-Add On (NC) (12.11.24 @ 14:44)   Bacteria: Too Numerous to count /HPF  Squamous Epithelial Cells: Present  Red Blood Cell - Urine: 25 /HPF  White Blood Cell - Urine: 15 /HPF  Hyaline Casts: Present    Culture - Blood (12.11.24 @ 10:00)   - Coagulase negative Staphylococcus: Detec  Gram Stain:   Growth in anaerobic bottle: Gram Positive Cocci in Clusters  Specimen Source: .Blood BLOOD  Organism: Blood Culture PCR  Culture Results:   Growth in anaerobic bottle: Gram Positive Cocci in Clusters   Direct identification is available within approximately 3-5   hours either by Blood Panel Multiplexed PCR or Direct   MALDI-TOF. Details: [email or web address]  Organism Identification: Blood Culture PCR  Method Type: PCR  Culture - Blood (12.11.24 @ 09:  50)   Specimen Source: .Blood BLOOD  Culture Results:   No growth at 48 Hours    FluA/FluB/RSV/COVID PCR (12.11.24 @ 09:50)   SARS-CoV-2 Result: NotDetec: This Respiratory Panel uses polymerase chain reaction (PCR) to detect for   influenza A; influenza B; and SARS-CoV-2.   This test was validated by [facility] and is in use under the FDA   Emergency Use Authorization (EUA) for clinical labs CLIA-certified to   perform high complexity testing. Test results should be correlated with   clinical presentation, patient history, and epidemiology.

## 2024-12-15 NOTE — PROGRESS NOTE ADULT - PROBLEM SELECTOR PLAN 3
Quantiferon TB Gold test  Nodify testing as OP Quantiferon TB Gold test  Nodify testing as OP  Follow up CT in 6-12 months

## 2024-12-15 NOTE — PROGRESS NOTE ADULT - ASSESSMENT
89 yo F, w/ morbid obesity,  ambulates with walker, with PMH of HTN, HLD, COPD (never smoker, on 2L O2 at home), BIPAP at night , HFpEF (EF 55-60%), Breast ca, Chronic Afib on Eliquis, Pulmonary HTN, presents with 1.5 weeks history  dry cough, SOB at rest and on exertion that has progressively worsened,pneumonia,Flu with borderline troponins,UTI-ESBL,Blood cx-coag - staph probable contaminant.  1.Tele monitoring.  2.Flu-abx.  3.Chronic afib-eliquis,coreg.  4.COPD-MDI,steroids.  5.Breast ca-anastrozole.  6.Rt sided CHF-lasix.  7.Bipap at night.  8.CT chest shows pulm nodule-repeat 6-12 mo.  9.HTN-inc hydralazine 50mg tid.  10.Knee pain lidocaine patch.  11.UTI-ESBL-abx as per ID.  12.Blood cx+-contaminant,check repeat blood cx.

## 2024-12-15 NOTE — PROGRESS NOTE ADULT - SUBJECTIVE AND OBJECTIVE BOX
Patient was seen and examined  Patient is a 89y old  Female who presents with a chief complaint of Influenza, COPD  exacerbation (15 Dec 2024 10:07)      INTERVAL HPI/OVERNIGHT EVENTS:  T(C): 36.4 (12-15-24 @ 07:34), Max: 36.7 (12-15-24 @ 04:38)  HR: 114 (12-15-24 @ 07:34) (53 - 114)  BP: 154/105 (12-15-24 @ 07:34) (127/72 - 154/111)  RR: 20 (12-15-24 @ 07:34) (18 - 20)  SpO2: 95% (12-15-24 @ 07:34) (95% - 97%)  Wt(kg): --  I&O's Summary    14 Dec 2024 07:01  -  15 Dec 2024 07:00  --------------------------------------------------------  IN: 0 mL / OUT: 3025 mL / NET: -3025 mL        LABS:              CAPILLARY BLOOD GLUCOSE                  MEDICATIONS  (STANDING):  anastrozole 1 milliGRAM(s) Oral daily  apixaban 5 milliGRAM(s) Oral every 12 hours  atorvastatin 10 milliGRAM(s) Oral at bedtime  carvedilol 12.5 milliGRAM(s) Oral every 12 hours  ertapenem  IVPB 1000 milliGRAM(s) IV Intermittent every 24 hours  famotidine    Tablet 20 milliGRAM(s) Oral two times a day  fluticasone propionate/ salmeterol 100-50 MICROgram(s) Diskus 1 Dose(s) Inhalation two times a day  furosemide    Tablet 40 milliGRAM(s) Oral daily  hydrALAZINE 50 milliGRAM(s) Oral every 8 hours  lidocaine   4% Patch 1 Patch Transdermal every 24 hours  montelukast 10 milliGRAM(s) Oral daily  oseltamivir 75 milliGRAM(s) Oral two times a day  tiotropium 2.5 MICROgram(s) Inhaler 2 Puff(s) Inhalation daily    MEDICATIONS  (PRN):  acetaminophen     Tablet .. 650 milliGRAM(s) Oral every 6 hours PRN Temp greater or equal to 38C (100.4F), Mild Pain (1 - 3)  albuterol    90 MICROgram(s) HFA Inhaler 2 Puff(s) Inhalation every 6 hours PRN for bronchospasm  ondansetron Injectable 4 milliGRAM(s) IV Push every 8 hours PRN Nausea and/or Vomiting  traMADol 25 milliGRAM(s) Oral every 8 hours PRN Severe Pain (7 - 10)      RADIOLOGY & ADDITIONAL TESTS:    Imaging Personally Reviewed:  [ ] YES  [ ] NO    REVIEW OF SYSTEMS:  CONSTITUTIONAL: No fever, weight loss, or fatigue  EYES: No eye pain, visual disturbances, or discharge  ENMT:  No difficulty hearing, tinnitus, vertigo; No sinus or throat pain  NECK: No pain or stiffness  BREASTS: No pain, masses, or nipple discharge  RESPIRATORY: No cough, wheezing, chills or hemoptysis; No shortness of breath  CARDIOVASCULAR: No chest pain, palpitations, dizziness, or leg swelling  GASTROINTESTINAL: No abdominal or epigastric pain. No nausea, vomiting, or hematemesis; No diarrhea or constipation. No melena or hematochezia.  GENITOURINARY: No dysuria, frequency, hematuria, or incontinence  NEUROLOGICAL: No headaches, memory loss, loss of strength, numbness, or tremors  SKIN: No itching, burning, rashes, or lesions   LYMPH NODES: No enlarged glands  ENDOCRINE: No heat or cold intolerance; No hair loss  MUSCULOSKELETAL: No joint pain or swelling; No muscle, back, or extremity pain  PSYCHIATRIC: No depression, anxiety, mood swings, or difficulty sleeping  HEME/LYMPH: No easy bruising, or bleeding gums  ALLERY AND IMMUNOLOGIC: No hives or eczema      Consultant(s) Notes Reviewed:  [ x ] YES  [ ] NO    PHYSICAL EXAM:  GENERAL: NAD, well-groomed, well-developed  HEAD:  Atraumatic, Normocephalic  EYES: EOMI, PERRLA, conjunctiva and sclera clear  ENMT: No tonsillar erythema, exudates, or enlargement; Moist mucous membranes, Good dentition, No lesions  NECK: Supple, No JVD, Normal thyroid  NERVOUS SYSTEM:  Alert & Oriented X3, Good concentration; Motor Strength 5/5 B/L upper and lower extremities; DTRs 2+ intact and symmetric  CHEST/LUNG: Clear to percussion bilaterally; No rales, rhonchi, wheezing, or rubs  HEART: Regular rate and rhythm; No murmurs, rubs, or gallops  ABDOMEN: Soft, Nontender, Nondistended; Bowel sounds present  EXTREMITIES:  2+ Peripheral Pulses, No clubbing, cyanosis, or edema  LYMPH: No lymphadenopathy noted  SKIN: No rashes or lesions    Care Discussed with Consultants/Other Providers [ x] YES  [ ] NO

## 2024-12-15 NOTE — PROGRESS NOTE ADULT - ASSESSMENT
89 yo F, w/ morbid obesity,  ambulates with walker, with PMH of HTN, HLD, COPD (never smoker, on 2L O2 at home), BIPAP at night , HFpEF (EF 55-60%), Breast ca, Chronic Afib on Eliquis, Pulmonary HTN, presents with 1.5 weeks history  dry cough, SOB at rest and on exertion that has progressively worsened,pneumonia,Flu with borderline troponins,UTI-ESBL,Blood cx-coag - staph probable contaminant.  1.Tele monitoring.  2.Flu-abx.  3.Chronic afib-eliquis,coreg.  4.COPD-MDI,steroids.  5.Breast ca-anastrozole.  6.Rt sided CHF-lasix.  7.Bipap at night.  8.CT chest shows pulm nodule-repeat 6-12 mo.  9.HTN-inc hydralazine 50mg tid.  10.Knee pain lidocaine patch.  11.UTI-ESBL-abx as per ID.  12.Blood cx+-contaminant,check repeat blood cx.  13. ID APPRECIATED

## 2024-12-15 NOTE — PROGRESS NOTE ADULT - SUBJECTIVE AND OBJECTIVE BOX
Date of Service 12-15-24 @ 10:08    CHIEF COMPLAINT:Patient is a 89y old  Female who presents with a chief complaint of Influenza, COPD  exacerbation .Pt appears comfortable.  	  REVIEW OF SYSTEMS:  CONSTITUTIONAL: No fever, weight loss, or fatigue  EYES: No eye pain, visual disturbances, or discharge  ENT:  No difficulty hearing, tinnitus, vertigo; No sinus or throat pain  NECK: No pain or stiffness  RESPIRATORY: No cough, wheezing, chills or hemoptysis; No Shortness of Breath  CARDIOVASCULAR: No chest pain, palpitations, passing out, dizziness, or leg swelling  GASTROINTESTINAL: No abdominal or epigastric pain. No nausea, vomiting, or hematemesis; No diarrhea or constipation. No melena or hematochezia.  GENITOURINARY: No dysuria, frequency, hematuria, or incontinence  NEUROLOGICAL: No headaches, memory loss, loss of strength, numbness, or tremors  SKIN: No itching, burning, rashes, or lesions   LYMPH Nodes: No enlarged glands  ENDOCRINE: No heat or cold intolerance; No hair loss  MUSCULOSKELETAL: No joint pain or swelling; No muscle, back, or extremity pain  PSYCHIATRIC: No depression, anxiety, mood swings, or difficulty sleeping  HEME/LYMPH: No easy bruising, or bleeding gums  ALLERGY AND IMMUNOLOGIC: No hives or eczema	        PHYSICAL EXAM:  T(C): 36.4 (12-15-24 @ 07:34), Max: 36.7 (12-15-24 @ 04:38)  HR: 114 (12-15-24 @ 07:34) (53 - 114)  BP: 154/105 (12-15-24 @ 07:34) (127/72 - 154/111)  RR: 20 (12-15-24 @ 07:34) (18 - 20)  SpO2: 95% (12-15-24 @ 07:34) (95% - 97%)  Wt(kg): --  I&O's Summary    14 Dec 2024 07:01  -  15 Dec 2024 07:00  --------------------------------------------------------  IN: 0 mL / OUT: 3025 mL / NET: -3025 mL        Appearance: Normal	  HEENT:   Normal oral mucosa, PERRL, EOMI	  Lymphatic: No lymphadenopathy  Cardiovascular: Normal S1 S2, No JVD, No murmurs, No edema  [facility]  Psychiatry: A & O x 3, Mood & affect appropriate  Gastrointestinal:  Soft, Non-tender, + BS	  Skin: No rashes, No ecchymoses, No cyanosis	  Neurologic: Non-focal  Extremities: Normal range of motion, No clubbing, cyanosis or edema  Vascular: Peripheral pulses palpable 2+ bilaterally    MEDICATIONS  (STANDING):  anastrozole 1 milliGRAM(s) Oral daily  apixaban 5 milliGRAM(s) Oral every 12 hours  atorvastatin 10 milliGRAM(s) Oral at bedtime  carvedilol 12.5 milliGRAM(s) Oral every 12 hours  ertapenem  IVPB 1000 milliGRAM(s) IV Intermittent every 24 hours  famotidine    Tablet 20 milliGRAM(s) Oral two times a day  fluticasone propionate/ salmeterol 100-50 MICROgram(s) Diskus 1 Dose(s) Inhalation two times a day  furosemide    Tablet 40 milliGRAM(s) Oral daily  hydrALAZINE 25 milliGRAM(s) Oral every 8 hours  lidocaine   4% Patch 1 Patch Transdermal every 24 hours  methylPREDNISolone sodium succinate Injectable 40 milliGRAM(s) IV Push every 12 hours  montelukast 10 milliGRAM(s) Oral daily  oseltamivir 75 milliGRAM(s) Oral two times a day  tiotropium 2.5 MICROgram(s) Inhaler 2 Puff(s) Inhalation daily      TELEMETRY: afib 60's	    	  	  LABS:	 	    none new

## 2024-12-15 NOTE — PROGRESS NOTE ADULT - SUBJECTIVE AND OBJECTIVE BOX
Patient is a 89y old  Female who presents with a chief complaint of Influenza, COPD  exacerbation (14 Dec 2024 12:23)  Patient is awake, alert, lying in bed in NAD. Doing well on room air. She feels better.    INTERVAL HPI/OVERNIGHT EVENTS:      VITAL SIGNS:  T(F): 97.5 (12-15-24 @ 07:34)  HR: 114 (12-15-24 @ 07:34)  BP: 154/105 (12-15-24 @ 07:34)  RR: 20 (12-15-24 @ 07:34)  SpO2: 95% (12-15-24 @ 07:34)  Wt(kg): --  I&O's Detail    14 Dec 2024 07:01  -  15 Dec 2024 07:00  --------------------------------------------------------  IN:  Total IN: 0 mL    OUT:    Voided (mL): 3025 mL  Total OUT: 3025 mL    Total NET: -3025 mL              REVIEW OF SYSTEMS:    CONSTITUTIONAL:  No fevers, chills, sweats    HEENT:  Eyes:  No diplopia or blurred vision. ENT:  No earache, sore throat or runny nose.    CARDIOVASCULAR:  No pressure, squeezing, tightness, or heaviness about the chest; no palpitations.    RESPIRATORY:  Per HPI    GASTROINTESTINAL:  No abdominal pain, nausea, vomiting or diarrhea.    GENITOURINARY:  No dysuria, frequency or urgency.    NEUROLOGIC:  No paresthesias, fasciculations, seizures or weakness.    PSYCHIATRIC:  No disorder of thought or mood.      PHYSICAL EXAM:    Constitutional: Well developed and nourished  Eyes:Perrla  ENMT: normal  Neck:supple  Respiratory: good air entry  Cardiovascular: S1 S2 regular  Gastrointestinal: Soft, Non tender  Extremities: No edema  Vascular:normal  Neurological:Awake, alert,Ox3  Musculoskeletal:Normal      MEDICATIONS  (STANDING):  anastrozole 1 milliGRAM(s) Oral daily  apixaban 5 milliGRAM(s) Oral every 12 hours  atorvastatin 10 milliGRAM(s) Oral at bedtime  carvedilol 12.5 milliGRAM(s) Oral every 12 hours  ertapenem  IVPB 1000 milliGRAM(s) IV Intermittent every 24 hours  famotidine    Tablet 20 milliGRAM(s) Oral two times a day  fluticasone propionate/ salmeterol 100-50 MICROgram(s) Diskus 1 Dose(s) Inhalation two times a day  furosemide    Tablet 40 milliGRAM(s) Oral daily  hydrALAZINE 25 milliGRAM(s) Oral every 8 hours  lidocaine   4% Patch 1 Patch Transdermal every 24 hours  methylPREDNISolone sodium succinate Injectable 40 milliGRAM(s) IV Push every 12 hours  montelukast 10 milliGRAM(s) Oral daily  oseltamivir 75 milliGRAM(s) Oral two times a day  tiotropium 2.5 MICROgram(s) Inhaler 2 Puff(s) Inhalation daily    MEDICATIONS  (PRN):  acetaminophen     Tablet .. 650 milliGRAM(s) Oral every 6 hours PRN Temp greater or equal to 38C (100.4F), Mild Pain (1 - 3)  albuterol    90 MICROgram(s) HFA Inhaler 2 Puff(s) Inhalation every 6 hours PRN for bronchospasm  ondansetron Injectable 4 milliGRAM(s) IV Push every 8 hours PRN Nausea and/or Vomiting  traMADol 25 milliGRAM(s) Oral every 8 hours PRN Severe Pain (7 - 10)      Allergies    losartan (Angioedema)    Intolerances    Chicken (Stomach Upset)      LABS:      Culture - Urine (12.11.24 @ 14:44)   - Ampicillin: R >16 These ampicillin results predict results for amoxicillin  - Ampicillin/Sulbactam: I 16/8  - Aztreonam: R 16  - Cefazolin: R >16 For uncomplicated UTI with K. pneumoniae, E. coli, or P. mirablis: NIDIA <=16 is sensitive and NIDIA >=32 is resistant. This also predicts results for oral agents cefaclor, cefdinir, cefpodoxime, cefprozil, cefuroxime axetil, cephalexin and locarbef for uncomplicated UTI. Note that some isolates may be susceptible to these agents while testing resistant to cefazolin.  - Cefepime: R >16  - Ceftriaxone: R >32  - Cefuroxime: R >16  - Ciprofloxacin: R 1  - Ertapenem: S <=0.5  - Gentamicin: S <=2  - Imipenem: S <=1  - Levofloxacin: S <=0.5  - Meropenem: S <=1  - Nitrofurantoin: S <=32 Should not be used to treat pyelonephritis  - Piperacillin/Tazobactam: S <=8  - Tobramycin: S <=2  - Trimethoprim/Sulfamethoxazole: R >2/38  Specimen Source: Clean Catch  Culture Results:   >100,000 CFU/ml Klebsiella pneumoniae ESBL  Organism Identification: Klebsiella pneumoniae ESBL  Organism: Klebsiella pneumoniae ESBL  Method Type: MICCulture - Blood (12.11.24 @ 09:50)   Specimen Source: .Blood BLOOD  Culture Results:   No growth at 72 Hours              CAPILLARY BLOOD GLUCOSE            RADIOLOGY & ADDITIONAL TESTS:    CXR:  < from: Xray Chest 1 View- PORTABLE-Urgent (12.11.24 @ 11:37) >  IMPRESSION:    No acute infiltrate. Cardiomegaly.    --- End of Report ---    < end of copied text >    Ct scan chest:  < from: CT Chest No Cont (12.12.24 @ 13:14) >  IMPRESSION:    No pneumonia.    5 mm right upper lobe nodule appears new as described above. 6-12 month   follow-up noncontrast chest CT can be performed for further evaluation as   clinically warranted.    --- End of Report ---    < end of copied text >    ekg;    echo:

## 2024-12-15 NOTE — PROGRESS NOTE ADULT - ASSESSMENT
Patient is a 89y old  Female  with morbid obesity,  ambulates with walker, with PMH of HTN, HLD, COPD (never smoker, on 2L O2 at home), BIPAP at night , HFpEF (EF 55-60%), severe RV, Afib on Eliquis, Pulmonary HTN, now presents to the ER for evaluation of worsening dry cough, SOB at rest and on exertion, fever, chills, and body aches. Patient endorses sick contact with daughter who might currently have the flu. Patient did not receive the flu vaccine this season. On admission, she found to have Fever, positive Influenza A and positive Urine analysis. She has started on Tamiflu and Azithromycin, and the ID consult requested to assist with further evaluation and antibiotic management.    # Influenza A  # UTI  # Bacteremia -Culture - Blood (12.11.24 @ 10:00) - Coagulase negative Staphylococcus- most likely a contaminant     would recommend:    1. Please obtain Labs and Repeat Blood cultures to document clearing the Blood stream  2. Continue Ertapenem to cover ESBL Isolates   3. Continue Tamiflu X 5 days  4. Droplet precaution    d/w covering NP, Rufus    Attending Attestation:    Spent more than 45 minutes on total encounter, more than 50 % of the visit was spent counseling and/or coordinating care by the Attending physician.

## 2024-12-16 LAB
CULTURE RESULTS: SIGNIFICANT CHANGE UP
SPECIMEN SOURCE: SIGNIFICANT CHANGE UP

## 2024-12-16 RX ORDER — PREDNISONE 20 MG/1
40 TABLET ORAL DAILY
Refills: 0 | Status: DISCONTINUED | OUTPATIENT
Start: 2024-12-16 | End: 2024-12-18

## 2024-12-16 RX ADMIN — FLUTICASONE PROPIONATE AND SALMETEROL XINAFOATE 1 DOSE(S): 45; 21 AEROSOL, METERED RESPIRATORY (INHALATION) at 09:51

## 2024-12-16 RX ADMIN — Medication 10 MILLIGRAM(S): at 21:39

## 2024-12-16 RX ADMIN — FUROSEMIDE 40 MILLIGRAM(S): 40 TABLET ORAL at 06:18

## 2024-12-16 RX ADMIN — HYDRALAZINE HYDROCHLORIDE 50 MILLIGRAM(S): 10 TABLET ORAL at 06:19

## 2024-12-16 RX ADMIN — CARVEDILOL 12.5 MILLIGRAM(S): 25 TABLET, FILM COATED ORAL at 06:19

## 2024-12-16 RX ADMIN — LIDOCAINE 1 PATCH: 40 CREAM TOPICAL at 19:31

## 2024-12-16 RX ADMIN — FLUTICASONE PROPIONATE AND SALMETEROL XINAFOATE 1 DOSE(S): 45; 21 AEROSOL, METERED RESPIRATORY (INHALATION) at 21:39

## 2024-12-16 RX ADMIN — ANASTROZOLE 1 MILLIGRAM(S): 1 TABLET, COATED ORAL at 11:58

## 2024-12-16 RX ADMIN — MONTELUKAST SODIUM 10 MILLIGRAM(S): 10 TABLET ORAL at 11:58

## 2024-12-16 RX ADMIN — APIXABAN 5 MILLIGRAM(S): 2.5 TABLET, FILM COATED ORAL at 06:18

## 2024-12-16 RX ADMIN — APIXABAN 5 MILLIGRAM(S): 2.5 TABLET, FILM COATED ORAL at 18:35

## 2024-12-16 RX ADMIN — HYDRALAZINE HYDROCHLORIDE 50 MILLIGRAM(S): 10 TABLET ORAL at 14:20

## 2024-12-16 RX ADMIN — Medication 75 MILLIGRAM(S): at 06:18

## 2024-12-16 RX ADMIN — FAMOTIDINE 20 MILLIGRAM(S): 20 TABLET, FILM COATED ORAL at 06:19

## 2024-12-16 RX ADMIN — CARVEDILOL 12.5 MILLIGRAM(S): 25 TABLET, FILM COATED ORAL at 18:35

## 2024-12-16 RX ADMIN — FAMOTIDINE 20 MILLIGRAM(S): 20 TABLET, FILM COATED ORAL at 18:35

## 2024-12-16 RX ADMIN — LIDOCAINE 1 PATCH: 40 CREAM TOPICAL at 23:45

## 2024-12-16 RX ADMIN — LIDOCAINE 1 PATCH: 40 CREAM TOPICAL at 11:58

## 2024-12-16 RX ADMIN — Medication 40 MILLIGRAM(S): at 06:19

## 2024-12-16 RX ADMIN — HYDRALAZINE HYDROCHLORIDE 50 MILLIGRAM(S): 10 TABLET ORAL at 21:39

## 2024-12-16 RX ADMIN — ERTAPENEM 120 MILLIGRAM(S): 1 INJECTION, POWDER, LYOPHILIZED, FOR SOLUTION INTRAMUSCULAR; INTRAVENOUS at 18:36

## 2024-12-16 RX ADMIN — Medication 2 PUFF(S): at 11:59

## 2024-12-16 NOTE — PHYSICAL THERAPY INITIAL EVALUATION ADULT - PRECAUTIONS/LIMITATIONS, REHAB EVAL
Blind/fall precautions/obesity precautions/vision precautions Droplet Influenza 12/11/24. Pt is blind in both eyes./fall precautions/isolation precautions/obesity precautions/vision precautions

## 2024-12-16 NOTE — PROGRESS NOTE ADULT - ASSESSMENT
Patient is a 89y old  Female  with morbid obesity,  ambulates with walker, with PMH of HTN, HLD, COPD (never smoker, on 2L O2 at home), BIPAP at night , HFpEF (EF 55-60%), severe RV, Afib on Eliquis, Pulmonary HTN, now presents to the ER for evaluation of worsening dry cough, SOB at rest and on exertion, fever, chills, and body aches. Patient endorses sick contact with daughter who might currently have the flu. Patient did not receive the flu vaccine this season. On admission, she found to have Fever, positive Influenza A and positive Urine analysis. She has started on Tamiflu and Azithromycin, and the ID consult requested to assist with further evaluation and antibiotic management.    # Influenza A  # UTI  # Bacteremia -Culture - Blood (12.11.24 @ 10:00) - Coagulase negative Staphylococcus- most likely a contaminant     would recommend:    1. Follow up Repeat Blood culture from 12/15 to document clearing the Blood stream  2. Continue Ertapenem to cover ESBL Isolates   3. Continue Tamiflu X 5 days  4. Droplet precaution    Attending Attestation:    Spent more than 35 minutes on total encounter, more than 50 % of the visit was spent counseling and/or coordinating care by the Attending physician.

## 2024-12-16 NOTE — PROGRESS NOTE ADULT - SUBJECTIVE AND OBJECTIVE BOX
Patient is seen and examined at the bed side, is afebrile. The repeat blood cultures are in process.      REVIEW OF SYSTEMS: All other review systems are negative      ALLERGIES: losartan (Angioedema)  Chicken (Stomach Upset)      Vital Signs Last 24 Hrs  T(C): 36.7 (16 Dec 2024 11:11), Max: 36.7 (16 Dec 2024 11:11)  T(F): 98.1 (16 Dec 2024 11:11), Max: 98.1 (16 Dec 2024 11:11)  HR: 67 (16 Dec 2024 11:11) (65 - 88)  BP: 150/93 (16 Dec 2024 11:11) (135/77 - 158/91)  BP(mean): --  RR: 19 (16 Dec 2024 11:11) (19 - 20)  SpO2: 98% (16 Dec 2024 11:11) (95% - 100%)    Parameters below as of 16 Dec 2024 11:11  Patient On (Oxygen Delivery Method): room air      PHYSICAL EXAM:  GENERAL: Not in distress   CHEST/LUNG:  Air entry bilaterally  HEART: s1 and s2 present  ABDOMEN:  Nontender and  Nondistended  EXTREMITIES: No pedal  edema  CNS: Awake and Alert      LABS: No new Labs                        15.3   5.30  )-----------( 140      ( 15 Dec 2024 12:00 )             46.6                             12.4   7.63  )-----------( 177      ( 13 Dec 2024 07:10 )             38.5       12-15    137  |  98  |  19[H]  ----------------------------<  164[H]  3.7   |  31  |  0.74    Ca    8.8      15 Dec 2024 11:12      12-13    136  |  100  |  21[H]  ----------------------------<  186[H]  4.4   |  33[H]  |  0.71    Ca    8.9      13 Dec 2024 07:10  Phos  3.5     12-13  Mg     2.1     12-13    TPro  7.0  /  Alb  3.2[L]  /  TBili  0.5  /  DBili  x   /  AST  25  /  ALT  20  /  AlkPhos  138[H]  12-13      MEDICATIONS  (STANDING):    anastrozole 1 milliGRAM(s) Oral daily  apixaban 5 milliGRAM(s) Oral every 12 hours  atorvastatin 10 milliGRAM(s) Oral at bedtime  carvedilol 12.5 milliGRAM(s) Oral every 12 hours  ertapenem  IVPB 1000 milliGRAM(s) IV Intermittent every 24 hours  famotidine    Tablet 20 milliGRAM(s) Oral two times a day  fluticasone propionate/ salmeterol 100-50 MICROgram(s) Diskus 1 Dose(s) Inhalation two times a day  furosemide    Tablet 40 milliGRAM(s) Oral daily  hydrALAZINE 50 milliGRAM(s) Oral every 8 hours  lidocaine   4% Patch 1 Patch Transdermal every 24 hours  montelukast 10 milliGRAM(s) Oral daily  predniSONE   Tablet 40 milliGRAM(s) Oral daily  tiotropium 2.5 MICROgram(s) Inhaler 2 Puff(s) Inhalation daily      RADIOLOGY & ADDITIONAL TESTS:    12/12/24 : CT Chest No Cont (12.12.24 @ 13:14) No pneumonia. 5 mm right upper lobe nodule appears new as described above. 6-12 month   follow-up noncontrast chest CT can be performed for further evaluation as  clinically warranted.      12/11/24 : Xray Chest 1 View- PORTABLE-Urgent (12.11.24 @ 11:37) No acute infiltrate. Cardiomegaly.      MICROBIOLOGY DATA:    Culture - Urine (12.11.24 @ 14:44)   - Ampicillin: R >16 These ampicillin results predict results for amoxicillin  - Ampicillin/Sulbactam: I 16/8  - Aztreonam: R 16  - Cefazolin: R >16 For uncomplicated UTI with K. pneumoniae, E. coli, or P. mirablis: NIDIA <=16 is sensitive and NIDIA >=32 is resistant. This also predicts results for oral agents cefaclor, cefdinir, cefpodoxime, cefprozil, cefuroxime axetil, cephalexin and locarbef for uncomplicated UTI. Note that some isolates may be susceptible to these agents while testing resistant to cefazolin.  - Cefepime: R >16  - Ceftriaxone: R >32  - Cefuroxime: R >16  - Ciprofloxacin: R 1  - Ertapenem: S <=0.5  - Gentamicin: S <=2  - Imipenem: S <=1  - Levofloxacin: S <=0.5  - Meropenem: S <=1  - Nitrofurantoin: S <=32 Should not be used to treat pyelonephritis  - Piperacillin/Tazobactam: S <=8  - Tobramycin: S <=2  - Trimethoprim/Sulfamethoxazole: R >2/38  Specimen Source: Clean Catch  Culture Results:   >100,000 CFU/ml Klebsiella pneumoniae ESBL  Organism Identification: Klebsiella pneumoniae ESBL  Organism: Klebsiella pneumoniae ESBL  Method Type: NIDIA      Culture - Urine (12.11.24 @ 14:44)   Specimen Source: Clean Catch  Culture Results:   >100,000 CFU/ml Klebsiella pneumoniae    Urine Microscopic-Add On (NC) (12.11.24 @ 14:44)   Bacteria: Too Numerous to count /HPF  Squamous Epithelial Cells: Present  Red Blood Cell - Urine: 25 /HPF  White Blood Cell - Urine: 15 /HPF  Hyaline Casts: Present    Culture - Blood (12.11.24 @ 10:00)   - Coagulase negative Staphylococcus: Detec  Gram Stain:   Growth in anaerobic bottle: Gram Positive Cocci in Clusters  Specimen Source: .Blood BLOOD  Organism: Blood Culture PCR  Culture Results:   Growth in anaerobic bottle: Gram Positive Cocci in Clusters   Direct identification is available within approximately 3-5   hours either by Blood Panel Multiplexed PCR or Direct   MALDI-TOF. Details: [email or web address]  Organism Identification: Blood Culture PCR  Method Type: PCR    Culture - Blood (12.11.24 @ 09:  50)   Specimen Source: .Blood BLOOD  Culture Results:   No growth at 4 days    FluA/FluB/RSV/COVID PCR (12.11.24 @ 09:50)   SARS-CoV-2 Result: NotDetec: This Respiratory Panel uses polymerase chain reaction (PCR) to detect for   influenza A; influenza B; and SARS-CoV-2.   This test was validated by [facility] and is in use under the FDA   Emergency Use Authorization (EUA) for clinical labs CLIA-certified to   perform high complexity testing. Test results should be correlated with   clinical presentation, patient history, and epidemiology.

## 2024-12-16 NOTE — PROGRESS NOTE ADULT - SUBJECTIVE AND OBJECTIVE BOX
Date of Service 12-16-24 @ 10:34    CHIEF COMPLAINT:Patient is a 89y old  Female who presents with a chief complaint of Influenza, COPD  exacerbation .Pt appears comfortable.    	  REVIEW OF SYSTEMS:  CONSTITUTIONAL: No fever, weight loss, or fatigue  EYES: No eye pain, visual disturbances, or discharge  ENT:  No difficulty hearing, tinnitus, vertigo; No sinus or throat pain  NECK: No pain or stiffness  RESPIRATORY: No cough, wheezing, chills or hemoptysis; No Shortness of Breath  CARDIOVASCULAR: No chest pain, palpitations, passing out, dizziness, or leg swelling  GASTROINTESTINAL: No abdominal or epigastric pain. No nausea, vomiting, or hematemesis; No diarrhea or constipation. No melena or hematochezia.  GENITOURINARY: No dysuria, frequency, hematuria, or incontinence  NEUROLOGICAL: No headaches, memory loss, loss of strength, numbness, or tremors  SKIN: No itching, burning, rashes, or lesions   LYMPH Nodes: No enlarged glands  ENDOCRINE: No heat or cold intolerance; No hair loss  MUSCULOSKELETAL: No joint pain or swelling; No muscle, back, or extremity pain  PSYCHIATRIC: No depression, anxiety, mood swings, or difficulty sleeping  HEME/LYMPH: No easy bruising, or bleeding gums  ALLERGY AND IMMUNOLOGIC: No hives or eczema	        PHYSICAL EXAM:  T(C): 36.6 (12-16-24 @ 07:16), Max: 36.8 (12-15-24 @ 11:15)  HR: 65 (12-16-24 @ 07:16) (65 - 88)  BP: 151/93 (12-16-24 @ 07:16) (135/77 - 158/91)  RR: 19 (12-16-24 @ 07:16) (18 - 20)  SpO2: 98% (12-16-24 @ 07:16) (95% - 100%)  Wt(kg): --  I&O's Summary    15 Dec 2024 07:01  -  16 Dec 2024 07:00  --------------------------------------------------------  IN: 290 mL / OUT: 2675 mL / NET: -2385 mL        Appearance: Normal	  HEENT:   Normal oral mucosa, PERRL, EOMI	  Lymphatic: No lymphadenopathy  Cardiovascular: Normal S1 S2, No JVD, No murmurs, No edema  Respiratory: Lungs clear to auscultation	  Psychiatry: A & O x 3, Mood & affect appropriate  Gastrointestinal:  Soft, Non-tender, + BS	  Skin: No rashes, No ecchymoses, No cyanosis	  Neurologic: Non-focal  Extremities: Normal range of motion, No clubbing, cyanosis or edema  Vascular: Peripheral pulses palpable 2+ bilaterally    MEDICATIONS  (STANDING):  anastrozole 1 milliGRAM(s) Oral daily  apixaban 5 milliGRAM(s) Oral every 12 hours  atorvastatin 10 milliGRAM(s) Oral at bedtime  carvedilol 12.5 milliGRAM(s) Oral every 12 hours  ertapenem  IVPB 1000 milliGRAM(s) IV Intermittent every 24 hours  famotidine    Tablet 20 milliGRAM(s) Oral two times a day  fluticasone propionate/ salmeterol 100-50 MICROgram(s) Diskus 1 Dose(s) Inhalation two times a day  furosemide    Tablet 40 milliGRAM(s) Oral daily  hydrALAZINE 50 milliGRAM(s) Oral every 8 hours  lidocaine   4% Patch 1 Patch Transdermal every 24 hours  methylPREDNISolone sodium succinate Injectable 40 milliGRAM(s) IV Push daily  montelukast 10 milliGRAM(s) Oral daily  tiotropium 2.5 MICROgram(s) Inhaler 2 Puff(s) Inhalation daily        LABS:	 	                          15.3   5.30  )-----------( 140      ( 15 Dec 2024 12:00 )             46.6     12-15    137  |  98  |  19[H]  ----------------------------<  164[H]  3.7   |  31  |  0.74    Ca    8.8      15 Dec 2024 11:12      < from: CT Chest No Cont (12.12.24 @ 13:14) >  ACC: [id number] EXAM:  CT CHEST   ORDERED BY: RAMBO WILLIAMSON     PROCEDURE DATE:  12/12/2024          INTERPRETATION:  INDICATION: Pneumonia    Axial CT images of the chest are obtained without intravenous   administration of contrast.    COMPARISON: Chest CT of January 31, 2023    Thyroid gland is heterogeneous with asymmetric enlargement of the left   lobe as on the prior study.    LYMPH NODES: No lymphadenopathy.    VASCULATURE: Cardiomegaly with biatrial dilation. Aortic root and valve   calcifications. Aortic intimal calcifications. No pleural or pericardial   effusions.    UPPER ABDOMEN: Colonic diverticulosis. Right renal cyst.    LUNGS: Respiratory motion artifact limits evaluation of the lung   parenchyma. 5 mm right upper lobe nodule appears new since the chest CT   of July 28, 2022. Accurate comparison with the use 30/1/2023 is limited   due to superimposed fair amount of respiratory motion artifact on the   prior study. No pneumonia.    Biapical scarring. Slightly asymmetricleft upper lobe apical opacity,   part of which measures about 1 cm on image 29 of series 3 is without   significant interval change since the prior study also likely related to   the apical scarring.    CHEST WALL: Bony degenerative changes with involvement of the spine and   the shoulders.    IMPRESSION:    No pneumonia.    5 mm right upper lobe nodule appears new as described above. 6-12 month   follow-up noncontrast chest CT can be performed for further evaluation as   clinically warranted.    --- End of Report ---            VEENA MENESES MD; Attending Radiologist  This document has been electronically signed. Dec 12 2024  2:28PM    < end of copied text >    	    Culture - Blood (12.11.24 @ 10:00)   - Coagulase negative Staphylococcus: Detec  Gram Stain:   Growth in anaerobic bottle: Gram Positive Cocci in Clusters  Specimen Source: .Blood BLOOD  Organism: Blood Culture PCR  Culture Results:   Growth in anaerobic bottle: Staphylococcus hominis   Isolation of Coagulase negative Staphylococcus from single blood culture   sets may represent   contamination. Contact the Microbiology Department at [phone] if   susceptibility testing is needed.   clinically indicated.   Direct identification is available within approximately 3-5   hours either by Blood Panel Multiplexed PCR or Direct   MALDI-TOF. Details: [email or web address]  Organism Identification: Blood Culture PCR  Method Type: PCR

## 2024-12-16 NOTE — PROGRESS NOTE ADULT - SUBJECTIVE AND OBJECTIVE BOX
NP Note discussed with  primary attending    Patient is a 89y old  Female who presents with a chief complaint of Influenza, COPD  exacerbation (16 Dec 2024 10:49)      INTERVAL HPI/OVERNIGHT EVENTS: no new complaints    MEDICATIONS  (STANDING):  anastrozole 1 milliGRAM(s) Oral daily  apixaban 5 milliGRAM(s) Oral every 12 hours  atorvastatin 10 milliGRAM(s) Oral at bedtime  carvedilol 12.5 milliGRAM(s) Oral every 12 hours  ertapenem  IVPB 1000 milliGRAM(s) IV Intermittent every 24 hours  famotidine    Tablet 20 milliGRAM(s) Oral two times a day  fluticasone propionate/ salmeterol 100-50 MICROgram(s) Diskus 1 Dose(s) Inhalation two times a day  furosemide    Tablet 40 milliGRAM(s) Oral daily  hydrALAZINE 50 milliGRAM(s) Oral every 8 hours  lidocaine   4% Patch 1 Patch Transdermal every 24 hours  montelukast 10 milliGRAM(s) Oral daily  predniSONE   Tablet 40 milliGRAM(s) Oral daily  tiotropium 2.5 MICROgram(s) Inhaler 2 Puff(s) Inhalation daily    MEDICATIONS  (PRN):  acetaminophen     Tablet .. 650 milliGRAM(s) Oral every 6 hours PRN Temp greater or equal to 38C (100.4F), Mild Pain (1 - 3)  albuterol    90 MICROgram(s) HFA Inhaler 2 Puff(s) Inhalation every 6 hours PRN for bronchospasm  ondansetron Injectable 4 milliGRAM(s) IV Push every 8 hours PRN Nausea and/or Vomiting  traMADol 25 milliGRAM(s) Oral every 8 hours PRN Severe Pain (7 - 10)      __________________________________________________  REVIEW OF SYSTEMS:    CONSTITUTIONAL: No fever,   EYES: no acute visual disturbances  NECK: No pain or stiffness  RESPIRATORY: No cough; No shortness of breath  CARDIOVASCULAR: No chest pain, no palpitations  GASTROINTESTINAL: No pain. No nausea or vomiting; No diarrhea   NEUROLOGICAL: No headache or numbness, no tremors  MUSCULOSKELETAL: No joint pain, no muscle pain  GENITOURINARY: no dysuria, no frequency, no hesitancy  PSYCHIATRY: no depression , no anxiety  ALL OTHER  ROS negative        Vital Signs Last 24 Hrs  T(C): 36.7 (16 Dec 2024 11:11), Max: 36.7 (16 Dec 2024 11:11)  T(F): 98.1 (16 Dec 2024 11:11), Max: 98.1 (16 Dec 2024 11:11)  HR: 67 (16 Dec 2024 11:11) (65 - 88)  BP: 150/93 (16 Dec 2024 11:11) (135/77 - 158/91)  BP(mean): --  RR: 19 (16 Dec 2024 11:11) (19 - 20)  SpO2: 98% (16 Dec 2024 11:11) (95% - 100%)    Parameters below as of 16 Dec 2024 11:11  Patient On (Oxygen Delivery Method): room air        ________________________________________________  PHYSICAL EXAM:  GENERAL: NAD  HEENT: Normocephalic;  conjunctivae and sclerae clear; moist mucous membranes;   NECK : supple  CHEST/LUNG: Clear to ausculitation bilaterally with good air entry   HEART: S1 S2  regular; no murmurs, gallops or rubs  ABDOMEN: Soft, Nontender, Nondistended; Bowel sounds present  EXTREMITIES: no cyanosis; no edema; no calf tenderness  SKIN: warm and dry; no rash  NERVOUS SYSTEM:  Awake and alert; Oriented  to place, person and time ; no new deficits    _________________________________________________  LABS:                        15.3   5.30  )-----------( 140      ( 15 Dec 2024 12:00 )             46.6     12-15    137  |  98  |  19[H]  ----------------------------<  164[H]  3.7   |  31  |  0.74    Ca    8.8      15 Dec 2024 11:12        Urinalysis Basic - ( 15 Dec 2024 11:12 )    Color: x / Appearance: x / SG: x / pH: x  Gluc: 164 mg/dL / Ketone: x  / Bili: x / Urobili: x   Blood: x / Protein: x / Nitrite: x   Leuk Esterase: x / RBC: x / WBC x   Sq Epi: x / Non Sq Epi: x / Bacteria: x      CAPILLARY BLOOD GLUCOSE      POCT Blood Glucose.: 147 mg/dL (15 Dec 2024 21:52)        RADIOLOGY & ADDITIONAL TESTS:  < from: CT Chest No Cont (12.12.24 @ 13:14) >  IMPRESSION:    No pneumonia.    5 mm right upper lobe nodule appears new as described above. 6-12 month   follow-up noncontrast chest CT can be performed for further evaluation as   clinically warranted.      < end of copied text >    Imaging Personally Reviewed:  YES    Consultant(s) Notes Reviewed:   YES    Care Discussed with Consultants :     Plan of care was discussed with patient and /or primary care giver; all questions and concerns were addressed and care was aligned with patient's wishes.

## 2024-12-16 NOTE — PHYSICAL THERAPY INITIAL EVALUATION ADULT - NSPTDISCHREC_GEN_A_CORE
Pt will benefit from completing rehabilitation in the home environment as opposed to a sub acute inpatient rehabilitation facility as she has around the clock support and Is never alone as per granddaughter. Granddaughter did not express any concerns for caring for pt at home and prefers to have her at home./Home PT

## 2024-12-16 NOTE — PROGRESS NOTE ADULT - PROBLEM SELECTOR PLAN 8
- Hx of HTN on Carvedilol 6.25mg BID, Lasix 40mg QD   - c/w home meds with parameters.  - carvedilol increased to 12.5mg BID  - start hydralazine 50mg q8 for better BP control

## 2024-12-16 NOTE — PHYSICAL THERAPY INITIAL EVALUATION ADULT - GENERAL OBSERVATIONS, REHAB EVAL
Consult received, chart reviewed. Patient received supine in bed, NAD Granddaughter at bedside, + NC however on RA, +tele, +primafit. Patient agreed to EVALUATION from Physical Therapist.

## 2024-12-16 NOTE — PROGRESS NOTE ADULT - PROBLEM SELECTOR PLAN 5
- Bcx 12/11: 1 bottle grew co-ag neg, likely contaminant  - F/U repeat bcx from 12/15  - ID Dr Denton consulted

## 2024-12-16 NOTE — PHYSICAL THERAPY INITIAL EVALUATION ADULT - ADDITIONAL COMMENTS
Pt lives in a private apt with no mandy with her granddaughter. Granddaughter states that there is always someone looking after pt and that she is never alone. Pt states she requires help with all ADLs including bathing, toileting, feeding, etc. Pt states that she normally ambulates with her rollator but does not go very far (could not quantify) and needs assistance for guidance as she is blind in both eyes. Per chart pt has 2L home O2 however is on RA at the time of evaluation. Pt tolerating RA well.

## 2024-12-16 NOTE — PROGRESS NOTE ADULT - SUBJECTIVE AND OBJECTIVE BOX
Patient was seen and examined  Patient is a 89y old  Female who presents with a chief complaint of Influenza, COPD  exacerbation (16 Dec 2024 08:57)      INTERVAL HPI/OVERNIGHT EVENTS:  T(C): 36.6 (12-16-24 @ 07:16), Max: 36.8 (12-15-24 @ 11:15)  HR: 65 (12-16-24 @ 07:16) (65 - 88)  BP: 151/93 (12-16-24 @ 07:16) (135/77 - 158/91)  RR: 19 (12-16-24 @ 07:16) (18 - 20)  SpO2: 98% (12-16-24 @ 07:16) (95% - 100%)  Wt(kg): --  I&O's Summary    15 Dec 2024 07:01  -  16 Dec 2024 07:00  --------------------------------------------------------  IN: 290 mL / OUT: 2675 mL / NET: -2385 mL        LABS:                        15.3   5.30  )-----------( 140      ( 15 Dec 2024 12:00 )             46.6     12-15    137  |  98  |  19[H]  ----------------------------<  164[H]  3.7   |  31  |  0.74    Ca    8.8      15 Dec 2024 11:12        Urinalysis Basic - ( 15 Dec 2024 11:12 )    Color: x / Appearance: x / SG: x / pH: x  Gluc: 164 mg/dL / Ketone: x  / Bili: x / Urobili: x   Blood: x / Protein: x / Nitrite: x   Leuk Esterase: x / RBC: x / WBC x   Sq Epi: x / Non Sq Epi: x / Bacteria: x      CAPILLARY BLOOD GLUCOSE      POCT Blood Glucose.: 147 mg/dL (15 Dec 2024 21:52)          Urinalysis Basic - ( 15 Dec 2024 11:12 )    Color: x / Appearance: x / SG: x / pH: x  Gluc: 164 mg/dL / Ketone: x  / Bili: x / Urobili: x   Blood: x / Protein: x / Nitrite: x   Leuk Esterase: x / RBC: x / WBC x   Sq Epi: x / Non Sq Epi: x / Bacteria: x        MEDICATIONS  (STANDING):  anastrozole 1 milliGRAM(s) Oral daily  apixaban 5 milliGRAM(s) Oral every 12 hours  atorvastatin 10 milliGRAM(s) Oral at bedtime  carvedilol 12.5 milliGRAM(s) Oral every 12 hours  ertapenem  IVPB 1000 milliGRAM(s) IV Intermittent every 24 hours  famotidine    Tablet 20 milliGRAM(s) Oral two times a day  fluticasone propionate/ salmeterol 100-50 MICROgram(s) Diskus 1 Dose(s) Inhalation two times a day  furosemide    Tablet 40 milliGRAM(s) Oral daily  hydrALAZINE 50 milliGRAM(s) Oral every 8 hours  lidocaine   4% Patch 1 Patch Transdermal every 24 hours  methylPREDNISolone sodium succinate Injectable 40 milliGRAM(s) IV Push daily  montelukast 10 milliGRAM(s) Oral daily  tiotropium 2.5 MICROgram(s) Inhaler 2 Puff(s) Inhalation daily    MEDICATIONS  (PRN):  acetaminophen     Tablet .. 650 milliGRAM(s) Oral every 6 hours PRN Temp greater or equal to 38C (100.4F), Mild Pain (1 - 3)  albuterol    90 MICROgram(s) HFA Inhaler 2 Puff(s) Inhalation every 6 hours PRN for bronchospasm  ondansetron Injectable 4 milliGRAM(s) IV Push every 8 hours PRN Nausea and/or Vomiting  traMADol 25 milliGRAM(s) Oral every 8 hours PRN Severe Pain (7 - 10)      RADIOLOGY & ADDITIONAL TESTS:    Imaging Personally Reviewed:  [ ] YES  [ ] NO    REVIEW OF SYSTEMS:  CONSTITUTIONAL: No fever, weight loss, or fatigue  EYES: No eye pain, visual disturbances, or discharge  ENMT:  No difficulty hearing, tinnitus, vertigo; No sinus or throat pain  NECK: No pain or stiffness  BREASTS: No pain, masses, or nipple discharge  RESPIRATORY: No cough, wheezing, chills or hemoptysis; No shortness of breath  CARDIOVASCULAR: No chest pain, palpitations, dizziness, or leg swelling  GASTROINTESTINAL: No abdominal or epigastric pain. No nausea, vomiting, or hematemesis; No diarrhea or constipation. No melena or hematochezia.  GENITOURINARY: No dysuria, frequency, hematuria, or incontinence  NEUROLOGICAL: No headaches, memory loss, loss of strength, numbness, or tremors  SKIN: No itching, burning, rashes, or lesions   LYMPH NODES: No enlarged glands  ENDOCRINE: No heat or cold intolerance; No hair loss  MUSCULOSKELETAL: No joint pain or swelling; No muscle, back, or extremity pain  PSYCHIATRIC: No depression, anxiety, mood swings, or difficulty sleeping  HEME/LYMPH: No easy bruising, or bleeding gums  ALLERY AND IMMUNOLOGIC: No hives or eczema      Consultant(s) Notes Reviewed:  [ x ] YES  [ ] NO    PHYSICAL EXAM:  GENERAL: NAD, well-groomed, well-developed  HEAD:  Atraumatic, Normocephalic  EYES: EOMI, PERRLA, conjunctiva and sclera clear  ENMT: No tonsillar erythema, exudates, or enlargement; Moist mucous membranes, Good dentition, No lesions  NECK: Supple, No JVD, Normal thyroid  NERVOUS SYSTEM:  Alert & Oriented X3, Good concentration; Motor Strength 5/5 B/L upper and lower extremities; DTRs 2+ intact and symmetric  CHEST/LUNG: Clear to percussion bilaterally; No rales, rhonchi, wheezing, or rubs  HEART: Regular rate and rhythm; No murmurs, rubs, or gallops  ABDOMEN: Soft, Nontender, Nondistended; Bowel sounds present  EXTREMITIES:  2+ Peripheral Pulses, No clubbing, cyanosis, or edema  LYMPH: No lymphadenopathy noted  SKIN: No rashes or lesions    Care Discussed with Consultants/Other Providers [ x] YES  [ ] NO

## 2024-12-16 NOTE — PROGRESS NOTE ADULT - PROBLEM SELECTOR PLAN 3
- p/w fever, chills, body aches.  - febrile to 100.7  - Influenza + with sick contacts  - S/p Tamiflu 75mg BID x 5 days

## 2024-12-16 NOTE — PHYSICAL THERAPY INITIAL EVALUATION ADULT - LEVEL OF INDEPENDENCE: BED TO CHAIR, REHAB EVAL
Pt declined to sit in a chair as she is tired. Pt educated on benefits of sitting in chair vs laying in bed. Pt still declined to transfer to chair.

## 2024-12-16 NOTE — PROGRESS NOTE ADULT - SUBJECTIVE AND OBJECTIVE BOX
Patient is a 89y old  Female who presents with a chief complaint of Influenza, COPD  exacerbation (16 Dec 2024 10:33)  Awake, alert, comfortable in bed in NAD. Feels better. No cough or sob    INTERVAL HPI/OVERNIGHT EVENTS:      VITAL SIGNS:  T(F): 97.8 (12-16-24 @ 07:16)  HR: 65 (12-16-24 @ 07:16)  BP: 151/93 (12-16-24 @ 07:16)  RR: 19 (12-16-24 @ 07:16)  SpO2: 98% (12-16-24 @ 07:16)  Wt(kg): --  I&O's Detail    15 Dec 2024 07:01  -  16 Dec 2024 07:00  --------------------------------------------------------  IN:    Oral Fluid: 290 mL  Total IN: 290 mL    OUT:    Voided (mL): 2675 mL  Total OUT: 2675 mL    Total NET: -2385 mL              REVIEW OF SYSTEMS:    CONSTITUTIONAL:  No fevers, chills, sweats    HEENT:  Eyes:  No diplopia or blurred vision. ENT:  No earache, sore throat or runny nose.    CARDIOVASCULAR:  No pressure, squeezing, tightness, or heaviness about the chest; no palpitations.    RESPIRATORY:  Per HPI    GASTROINTESTINAL:  No abdominal pain, nausea, vomiting or diarrhea.    GENITOURINARY:  No dysuria, frequency or urgency.    NEUROLOGIC:  No paresthesias, fasciculations, seizures or weakness.    PSYCHIATRIC:  No disorder of thought or mood.      PHYSICAL EXAM:    Constitutional: Well developed and nourished  Eyes:Perrla  ENMT: normal  Neck:supple  Respiratory: good air entry  Cardiovascular: S1 S2 regular  Gastrointestinal: Soft, Non tender  Extremities: No edema  Vascular:normal  Neurological:Awake, alert,Ox3  Musculoskeletal:Normal      MEDICATIONS  (STANDING):  anastrozole 1 milliGRAM(s) Oral daily  apixaban 5 milliGRAM(s) Oral every 12 hours  atorvastatin 10 milliGRAM(s) Oral at bedtime  carvedilol 12.5 milliGRAM(s) Oral every 12 hours  ertapenem  IVPB 1000 milliGRAM(s) IV Intermittent every 24 hours  famotidine    Tablet 20 milliGRAM(s) Oral two times a day  fluticasone propionate/ salmeterol 100-50 MICROgram(s) Diskus 1 Dose(s) Inhalation two times a day  furosemide    Tablet 40 milliGRAM(s) Oral daily  hydrALAZINE 50 milliGRAM(s) Oral every 8 hours  lidocaine   4% Patch 1 Patch Transdermal every 24 hours  methylPREDNISolone sodium succinate Injectable 40 milliGRAM(s) IV Push daily  montelukast 10 milliGRAM(s) Oral daily  tiotropium 2.5 MICROgram(s) Inhaler 2 Puff(s) Inhalation daily    MEDICATIONS  (PRN):  acetaminophen     Tablet .. 650 milliGRAM(s) Oral every 6 hours PRN Temp greater or equal to 38C (100.4F), Mild Pain (1 - 3)  albuterol    90 MICROgram(s) HFA Inhaler 2 Puff(s) Inhalation every 6 hours PRN for bronchospasm  ondansetron Injectable 4 milliGRAM(s) IV Push every 8 hours PRN Nausea and/or Vomiting  traMADol 25 milliGRAM(s) Oral every 8 hours PRN Severe Pain (7 - 10)      Allergies    losartan (Angioedema)    Intolerances    Chicken (Stomach Upset)      LABS:                        15.3   5.30  )-----------( 140      ( 15 Dec 2024 12:00 )             46.6     12-15    137  |  98  |  19[H]  ----------------------------<  164[H]  3.7   |  31  |  0.74    Ca    8.8      15 Dec 2024 11:12        Urinalysis Basic - ( 15 Dec 2024 11:12 )    Color: x / Appearance: x / SG: x / pH: x  Gluc: 164 mg/dL / Ketone: x  / Bili: x / Urobili: x   Blood: x / Protein: x / Nitrite: x   Leuk Esterase: x / RBC: x / WBC x   Sq Epi: x / Non Sq Epi: x / Bacteria: x            CAPILLARY BLOOD GLUCOSE      POCT Blood Glucose.: 147 mg/dL (15 Dec 2024 21:52)        RADIOLOGY & ADDITIONAL TESTS:    CXR:  < from: CT Chest No Cont (12.12.24 @ 13:14) >  IMPRESSION:    No pneumonia.    5 mm right upper lobe nodule appears new as described above. 6-12 month   follow-up noncontrast chest CT can be performed for further evaluation as   clinically warranted.      < end of copied text >    Ct scan chest:    ekg;    echo:

## 2024-12-16 NOTE — PROGRESS NOTE ADULT - PROBLEM SELECTOR PLAN 4
- Patient denies dysuria, urinary frequency, urgency  - UA positive, Ucx growing ESBL Klebsiella PNA   - Switch Ceftriaxone 1g to Ertapenem  - ID Dr Denton consulted

## 2024-12-16 NOTE — PROGRESS NOTE ADULT - PROBLEM SELECTOR PLAN 7
- Hx of HFpEF (EF 55-60%), severe pulm HTN  - on Carvedilol 6.25mg BID, Lasix 40mg QD at home  - c/w home meds

## 2024-12-16 NOTE — PROGRESS NOTE ADULT - ASSESSMENT
89 yo F, w/ morbid obesity,  ambulates with walker, with PMH of HTN, HLD, COPD (never smoker, on 2L O2 at home), BIPAP at night , HFpEF (EF 55-60%), Breast ca, Chronic Afib on Eliquis, Pulmonary HTN, presents with 1.5 weeks history  dry cough, SOB at rest and on exertion that has progressively worsened,pneumonia,Flu with borderline troponins,UTI-ESBL,Blood cx-coag - staph probable contaminant.  1.Tele monitoring.  2.Flu-abx.  3.Chronic afib-eliquis,coreg.  4.COPD-MDI,steroids.  5.Breast ca-anastrozole.  6.Rt sided CHF-lasix.  7.Bipap at night.  8.CT chest shows pulm nodule-repeat 6-12 mo.  9.HTN-inc hydralazine 50mg tid.  10.Knee pain lidocaine patch.  11.UTI-ESBL-abx as per ID.  12.Blood cx+-contaminant,check repeat blood cx.  13. ID APPRECIATED

## 2024-12-16 NOTE — PROGRESS NOTE ADULT - ASSESSMENT
89 yo F, w/ morbid obesity,  ambulates with walker, with PMH of HTN, HLD, COPD (never smoker, on 2L O2 at home), BIPAP at night , HFpEF (EF 55-60%), Breast ca, Chronic Afib on Eliquis, Pulmonary HTN, presents with 1.5 weeks history  dry cough, SOB at rest and on exertion that has progressively worsened,pneumonia,Flu with borderline troponins,UTI-ESBL,Blood cx-coag - staph probable contaminant.  1.Tele monitoring.  2.Flu-abx.  3.Chronic afib-eliquis,coreg.  4.COPD-MDI,steroids.  5.Breast ca-anastrozole.  6.Rt sided CHF-lasix.  7.Bipap at night.  8.CT chest shows pulm nodule-repeat 6-12 mo.  9.HTN-inc hydralazine 50mg tid.  10.Knee pain lidocaine patch.  11.UTI-ESBL-abx as per ID.  12.Blood cx+-contaminant,check repeat blood cx.

## 2024-12-16 NOTE — PHYSICAL THERAPY INITIAL EVALUATION ADULT - DIAGNOSIS, PT EVAL
(ICF Model) Pt. present w/deficits in Body Structures/Function (Impairments), incl: Strength, pain, endurance leading to deficits in performing the below noted Activities (Limitations).

## 2024-12-17 LAB
ANION GAP SERPL CALC-SCNC: 4 MMOL/L — LOW (ref 5–17)
BUN SERPL-MCNC: 17 MG/DL — SIGNIFICANT CHANGE UP (ref 7–18)
CALCIUM SERPL-MCNC: 8.6 MG/DL — SIGNIFICANT CHANGE UP (ref 8.4–10.5)
CHLORIDE SERPL-SCNC: 97 MMOL/L — SIGNIFICANT CHANGE UP (ref 96–108)
CO2 SERPL-SCNC: 35 MMOL/L — HIGH (ref 22–31)
CREAT SERPL-MCNC: 0.75 MG/DL — SIGNIFICANT CHANGE UP (ref 0.5–1.3)
EGFR: 74 ML/MIN/1.73M2 — SIGNIFICANT CHANGE UP
GLUCOSE SERPL-MCNC: 149 MG/DL — HIGH (ref 70–99)
HCT VFR BLD CALC: 44.2 % — SIGNIFICANT CHANGE UP (ref 34.5–45)
HGB BLD-MCNC: 14.6 G/DL — SIGNIFICANT CHANGE UP (ref 11.5–15.5)
MCHC RBC-ENTMCNC: 30.7 PG — SIGNIFICANT CHANGE UP (ref 27–34)
MCHC RBC-ENTMCNC: 33 G/DL — SIGNIFICANT CHANGE UP (ref 32–36)
MCV RBC AUTO: 93.1 FL — SIGNIFICANT CHANGE UP (ref 80–100)
NRBC # BLD: 0 /100 WBCS — SIGNIFICANT CHANGE UP (ref 0–0)
PLATELET # BLD AUTO: 229 K/UL — SIGNIFICANT CHANGE UP (ref 150–400)
POTASSIUM SERPL-MCNC: 3.4 MMOL/L — LOW (ref 3.5–5.3)
POTASSIUM SERPL-SCNC: 3.4 MMOL/L — LOW (ref 3.5–5.3)
RBC # BLD: 4.75 M/UL — SIGNIFICANT CHANGE UP (ref 3.8–5.2)
RBC # FLD: 14.4 % — SIGNIFICANT CHANGE UP (ref 10.3–14.5)
SODIUM SERPL-SCNC: 136 MMOL/L — SIGNIFICANT CHANGE UP (ref 135–145)
WBC # BLD: 7.45 K/UL — SIGNIFICANT CHANGE UP (ref 3.8–10.5)
WBC # FLD AUTO: 7.45 K/UL — SIGNIFICANT CHANGE UP (ref 3.8–10.5)

## 2024-12-17 RX ORDER — ERTAPENEM 1 G/1
1000 INJECTION, POWDER, LYOPHILIZED, FOR SOLUTION INTRAMUSCULAR; INTRAVENOUS EVERY 24 HOURS
Refills: 0 | Status: DISCONTINUED | OUTPATIENT
Start: 2024-12-17 | End: 2024-12-18

## 2024-12-17 RX ADMIN — FAMOTIDINE 20 MILLIGRAM(S): 20 TABLET, FILM COATED ORAL at 18:34

## 2024-12-17 RX ADMIN — APIXABAN 5 MILLIGRAM(S): 2.5 TABLET, FILM COATED ORAL at 05:36

## 2024-12-17 RX ADMIN — FLUTICASONE PROPIONATE AND SALMETEROL XINAFOATE 1 DOSE(S): 45; 21 AEROSOL, METERED RESPIRATORY (INHALATION) at 21:51

## 2024-12-17 RX ADMIN — FLUTICASONE PROPIONATE AND SALMETEROL XINAFOATE 1 DOSE(S): 45; 21 AEROSOL, METERED RESPIRATORY (INHALATION) at 11:52

## 2024-12-17 RX ADMIN — HYDRALAZINE HYDROCHLORIDE 50 MILLIGRAM(S): 10 TABLET ORAL at 13:20

## 2024-12-17 RX ADMIN — FUROSEMIDE 40 MILLIGRAM(S): 40 TABLET ORAL at 05:36

## 2024-12-17 RX ADMIN — LIDOCAINE 1 PATCH: 40 CREAM TOPICAL at 11:53

## 2024-12-17 RX ADMIN — LIDOCAINE 1 PATCH: 40 CREAM TOPICAL at 19:15

## 2024-12-17 RX ADMIN — HYDRALAZINE HYDROCHLORIDE 50 MILLIGRAM(S): 10 TABLET ORAL at 05:36

## 2024-12-17 RX ADMIN — PREDNISONE 40 MILLIGRAM(S): 20 TABLET ORAL at 05:35

## 2024-12-17 RX ADMIN — APIXABAN 5 MILLIGRAM(S): 2.5 TABLET, FILM COATED ORAL at 18:33

## 2024-12-17 RX ADMIN — HYDRALAZINE HYDROCHLORIDE 50 MILLIGRAM(S): 10 TABLET ORAL at 21:51

## 2024-12-17 RX ADMIN — ERTAPENEM 120 MILLIGRAM(S): 1 INJECTION, POWDER, LYOPHILIZED, FOR SOLUTION INTRAMUSCULAR; INTRAVENOUS at 12:37

## 2024-12-17 RX ADMIN — ANASTROZOLE 1 MILLIGRAM(S): 1 TABLET, COATED ORAL at 11:56

## 2024-12-17 RX ADMIN — CARVEDILOL 12.5 MILLIGRAM(S): 25 TABLET, FILM COATED ORAL at 05:35

## 2024-12-17 RX ADMIN — Medication 10 MILLIGRAM(S): at 21:51

## 2024-12-17 RX ADMIN — Medication 2 PUFF(S): at 11:53

## 2024-12-17 RX ADMIN — MONTELUKAST SODIUM 10 MILLIGRAM(S): 10 TABLET ORAL at 11:54

## 2024-12-17 RX ADMIN — FAMOTIDINE 20 MILLIGRAM(S): 20 TABLET, FILM COATED ORAL at 05:36

## 2024-12-17 NOTE — PROGRESS NOTE ADULT - SUBJECTIVE AND OBJECTIVE BOX
Patient is a 89y old  Female who presents with a chief complaint of Influenza, COPD  exacerbation (17 Dec 2024 09:19)  Awake, alert, comfortable on RA in NAD    INTERVAL HPI/OVERNIGHT EVENTS:      VITAL SIGNS:  T(F): 97.5 (12-17-24 @ 11:21)  HR: 80 (12-17-24 @ 11:21)  BP: 121/81 (12-17-24 @ 11:21)  RR: 18 (12-17-24 @ 11:21)  SpO2: 100% (12-17-24 @ 11:21)  Wt(kg): --  I&O's Detail    16 Dec 2024 07:01  -  17 Dec 2024 07:00  --------------------------------------------------------  IN:  Total IN: 0 mL    OUT:    Voided (mL): 2000 mL  Total OUT: 2000 mL    Total NET: -2000 mL              REVIEW OF SYSTEMS:    CONSTITUTIONAL:  No fevers, chills, sweats    HEENT:  Eyes:  No diplopia or blurred vision. ENT:  No earache, sore throat or runny nose.    CARDIOVASCULAR:  No pressure, squeezing, tightness, or heaviness about the chest; no palpitations.    RESPIRATORY:  Per HPI    GASTROINTESTINAL:  No abdominal pain, nausea, vomiting or diarrhea.    GENITOURINARY:  No dysuria, frequency or urgency.    NEUROLOGIC:  No paresthesias, fasciculations, seizures or weakness.    PSYCHIATRIC:  No disorder of thought or mood.      PHYSICAL EXAM:    Constitutional: Well developed and nourished  Eyes:Perrla  ENMT: normal  Neck:supple  Respiratory: good air entry  Cardiovascular: S1 S2 regular  Gastrointestinal: Soft, Non tender  Extremities: No edema  Vascular:normal  Neurological:Awake, alert,Ox3  Musculoskeletal:Normal      MEDICATIONS  (STANDING):  anastrozole 1 milliGRAM(s) Oral daily  apixaban 5 milliGRAM(s) Oral every 12 hours  atorvastatin 10 milliGRAM(s) Oral at bedtime  carvedilol 12.5 milliGRAM(s) Oral every 12 hours  ertapenem  IVPB 1000 milliGRAM(s) IV Intermittent every 24 hours  famotidine    Tablet 20 milliGRAM(s) Oral two times a day  fluticasone propionate/ salmeterol 100-50 MICROgram(s) Diskus 1 Dose(s) Inhalation two times a day  furosemide    Tablet 40 milliGRAM(s) Oral daily  hydrALAZINE 50 milliGRAM(s) Oral every 8 hours  lidocaine   4% Patch 1 Patch Transdermal every 24 hours  montelukast 10 milliGRAM(s) Oral daily  predniSONE   Tablet 40 milliGRAM(s) Oral daily  tiotropium 2.5 MICROgram(s) Inhaler 2 Puff(s) Inhalation daily    MEDICATIONS  (PRN):  acetaminophen     Tablet .. 650 milliGRAM(s) Oral every 6 hours PRN Temp greater or equal to 38C (100.4F), Mild Pain (1 - 3)  albuterol    90 MICROgram(s) HFA Inhaler 2 Puff(s) Inhalation every 6 hours PRN for bronchospasm  ondansetron Injectable 4 milliGRAM(s) IV Push every 8 hours PRN Nausea and/or Vomiting  traMADol 25 milliGRAM(s) Oral every 8 hours PRN Severe Pain (7 - 10)      Allergies    losartan (Angioedema)    Intolerances    Chicken (Stomach Upset)      LABS:                        14.6   7.45  )-----------( 229      ( 17 Dec 2024 07:35 )             44.2     12-17    136  |  97  |  17  ----------------------------<  149[H]  3.4[L]   |  35[H]  |  0.75    Ca    8.6      17 Dec 2024 07:35        Urinalysis Basic - ( 17 Dec 2024 07:35 )    Color: x / Appearance: x / SG: x / pH: x  Gluc: 149 mg/dL / Ketone: x  / Bili: x / Urobili: x   Blood: x / Protein: x / Nitrite: x   Leuk Esterase: x / RBC: x / WBC x   Sq Epi: x / Non Sq Epi: x / Bacteria: x            CAPILLARY BLOOD GLUCOSE            RADIOLOGY & ADDITIONAL TESTS:  < from: CT Chest No Cont (12.12.24 @ 13:14) >  IMPRESSION:    No pneumonia.    5 mm right upper lobe nodule appears new as described above. 6-12 month   follow-up noncontrast chest CT can be performed for further evaluation as   clinically warranted.    < end of copied text >  < from: Xray Chest 1 View- PORTABLE-Urgent (12.11.24 @ 11:37) >  IMPRESSION:    No acute infiltrate. Cardiomegaly.    < end of copied text >    CXR:    Ct scan chest:    ekg;    echo:

## 2024-12-17 NOTE — PROGRESS NOTE ADULT - PROBLEM SELECTOR PLAN 4
- Patient denies dysuria, urinary frequency, urgency  - UA positive, Ucx growing ESBL Klebsiella PNA   - Switch Ceftriaxone 1g to Ertapenem until 12/18  - ID Dr Dneton consulted

## 2024-12-17 NOTE — POST DISCHARGE NOTE - NOTIFICATION:
Notified Dr. Bianchi of patient's CT findings.  Please contact [email or web address] or [phone] or select “[facility]” on the discharge disposition sheet when the patient is close to discharge for assistance in outpatient care.

## 2024-12-17 NOTE — PROGRESS NOTE ADULT - ASSESSMENT
89 yo F, w/ morbid obesity,  ambulates with walker, with PMH of HTN, HLD, COPD (never smoker, on 2L O2 at home), BIPAP at night , HFpEF (EF 55-60%), Breast ca, Chronic Afib on Eliquis, Pulmonary HTN, presents with 1.5 weeks history  dry cough, SOB at rest and on exertion that has progressively worsened,pneumonia,Flu with borderline troponins,UTI-ESBL,Blood cx-coag - staph probable contaminant.  1.D/C Tele monitoring.  2.Flu-abx.  3.Chronic afib-eliquis,coreg.  4.COPD-MDI,steroids.  5.Breast ca-anastrozole.  6.Rt sided CHF-lasix.  7.Bipap at night.  8.CT chest shows pulm nodule-repeat 6-12 mo.  9.HTN- hydralazine 50mg tid.  10.Knee pain lidocaine patch.  11.UTI-ESBL-abx as per ID.

## 2024-12-17 NOTE — PROGRESS NOTE ADULT - PROBLEM SELECTOR PLAN 5
- Bcx 12/11: 1 bottle grew co-ag neg, likely contaminant  - repeat bcx from 12/15: NGTD  - ID Dr Denton consulted

## 2024-12-17 NOTE — PROGRESS NOTE ADULT - SUBJECTIVE AND OBJECTIVE BOX
Date of Service 12-17-24 @ 09:19    CHIEF COMPLAINT:Patient is a 89y old  Female who presents with a chief complaint of Influenza, COPD  exacerbation .Pt appears comfortable.    	  REVIEW OF SYSTEMS:  CONSTITUTIONAL: No fever, weight loss, or fatigue  EYES: No eye pain, visual disturbances, or discharge  ENT:  No difficulty hearing, tinnitus, vertigo; No sinus or throat pain  NECK: No pain or stiffness  RESPIRATORY: No cough, wheezing, chills or hemoptysis; No Shortness of Breath  CARDIOVASCULAR: No chest pain, palpitations, passing out, dizziness, or leg swelling  GASTROINTESTINAL: No abdominal or epigastric pain. No nausea, vomiting, or hematemesis; No diarrhea or constipation. No melena or hematochezia.  GENITOURINARY: No dysuria, frequency, hematuria, or incontinence  NEUROLOGICAL: No headaches, memory loss, loss of strength, numbness, or tremors  SKIN: No itching, burning, rashes, or lesions   LYMPH Nodes: No enlarged glands  ENDOCRINE: No heat or cold intolerance; No hair loss  MUSCULOSKELETAL: No joint pain or swelling; No muscle, back, or extremity pain  PSYCHIATRIC: No depression, anxiety, mood swings, or difficulty sleeping  HEME/LYMPH: No easy bruising, or bleeding gums  ALLERGY AND IMMUNOLOGIC: No hives or eczema	      PHYSICAL EXAM:  T(C): 36.8 (12-17-24 @ 07:30), Max: 37 (12-17-24 @ 05:06)  HR: 83 (12-17-24 @ 09:11) (67 - 99)  BP: 126/84 (12-17-24 @ 07:30) (123/91 - 150/93)  RR: 20 (12-17-24 @ 07:30) (18 - 20)  SpO2: 94% (12-17-24 @ 09:11) (92% - 100%)  Wt(kg): --  I&O's Summary    16 Dec 2024 07:01  -  17 Dec 2024 07:00  --------------------------------------------------------  IN: 0 mL / OUT: 2000 mL / NET: -2000 mL        Appearance: Normal	  HEENT:   Normal oral mucosa, PERRL, EOMI	  Lymphatic: No lymphadenopathy  Cardiovascular: Normal S1 S2, No JVD, No murmurs, No edema  Respiratory: Lungs clear to auscultation	  Psychiatry: A & O x 3, Mood & affect appropriate  Gastrointestinal:  Soft, Non-tender, + BS	  Skin: No rashes, No ecchymoses, No cyanosis	  Neurologic: Non-focal  Extremities: Normal range of motion, No clubbing, cyanosis or edema  Vascular: Peripheral pulses palpable 2+ bilaterally    MEDICATIONS  (STANDING):  anastrozole 1 milliGRAM(s) Oral daily  apixaban 5 milliGRAM(s) Oral every 12 hours  atorvastatin 10 milliGRAM(s) Oral at bedtime  carvedilol 12.5 milliGRAM(s) Oral every 12 hours  famotidine    Tablet 20 milliGRAM(s) Oral two times a day  fluticasone propionate/ salmeterol 100-50 MICROgram(s) Diskus 1 Dose(s) Inhalation two times a day  furosemide    Tablet 40 milliGRAM(s) Oral daily  hydrALAZINE 50 milliGRAM(s) Oral every 8 hours  lidocaine   4% Patch 1 Patch Transdermal every 24 hours  montelukast 10 milliGRAM(s) Oral daily  predniSONE   Tablet 40 milliGRAM(s) Oral daily  tiotropium 2.5 MICROgram(s) Inhaler 2 Puff(s) Inhalation daily      TELEMETRY: 	afib  40-80's  	  	  LABS:	 	                      14.6   7.45  )-----------( 229      ( 17 Dec 2024 07:35 )             44.2     12-17    136  |  97  |  17  ----------------------------<  149[H]  3.4[L]   |  35[H]  |  0.75    Ca    8.6      17 Dec 2024 07:35      	        blood cx-.

## 2024-12-17 NOTE — PROGRESS NOTE ADULT - SUBJECTIVE AND OBJECTIVE BOX
Patient is seen and examined at the bed side, is afebrile. The repeat blood cultures are in process.      REVIEW OF SYSTEMS: All other review systems are negative      ALLERGIES: losartan (Angioedema)  Chicken (Stomach Upset)      Vital Signs Last 24 Hrs  T(C): 36.8 (17 Dec 2024 07:30), Max: 37 (17 Dec 2024 05:06)  T(F): 98.2 (17 Dec 2024 07:30), Max: 98.6 (17 Dec 2024 05:06)  HR: 78 (17 Dec 2024 07:30) (67 - 99)  BP: 126/84 (17 Dec 2024 07:30) (123/91 - 150/93)  BP(mean): --  RR: 20 (17 Dec 2024 07:30) (18 - 20)  SpO2: 92% (17 Dec 2024 07:30) (92% - 100%)    Parameters below as of 17 Dec 2024 07:30  Patient On (Oxygen Delivery Method): room air      PHYSICAL EXAM:  GENERAL: Not in distress   CHEST/LUNG:  Air entry bilaterally  HEART: s1 and s2 present  ABDOMEN:  Nontender and  Nondistended  EXTREMITIES: No pedal  edema  CNS: Awake and Alert      LABS:                           14.6   7.45  )-----------( 229      ( 17 Dec 2024 07:35 )             44.2                           15.3   5.30  )-----------( 140      ( 15 Dec 2024 12:00 )             46.6         12-17    136  |  97  |  17  ----------------------------<  149[H]  3.4[L]   |  35[H]  |  0.75    Ca    8.6      17 Dec 2024 07:35      12-15    137  |  98  |  19[H]  ----------------------------<  164[H]  3.7   |  31  |  0.74    Ca    8.8      15 Dec 2024 11:12      TPro  7.0  /  Alb  3.2[L]  /  TBili  0.5  /  DBili  x   /  AST  25  /  ALT  20  /  AlkPhos  138[H]  12-13      MEDICATIONS  (STANDING):    anastrozole 1 milliGRAM(s) Oral daily  apixaban 5 milliGRAM(s) Oral every 12 hours  atorvastatin 10 milliGRAM(s) Oral at bedtime  carvedilol 12.5 milliGRAM(s) Oral every 12 hours  famotidine    Tablet 20 milliGRAM(s) Oral two times a day  fluticasone propionate/ salmeterol 100-50 MICROgram(s) Diskus 1 Dose(s) Inhalation two times a day  furosemide    Tablet 40 milliGRAM(s) Oral daily  hydrALAZINE 50 milliGRAM(s) Oral every 8 hours  lidocaine   4% Patch 1 Patch Transdermal every 24 hours  montelukast 10 milliGRAM(s) Oral daily  predniSONE   Tablet 40 milliGRAM(s) Oral daily  tiotropium 2.5 MICROgram(s) Inhaler 2 Puff(s) Inhalation daily      RADIOLOGY & ADDITIONAL TESTS:    12/12/24 : CT Chest No Cont (12.12.24 @ 13:14) No pneumonia. 5 mm right upper lobe nodule appears new as described above. 6-12 month   follow-up noncontrast chest CT can be performed for further evaluation as  clinically warranted.      12/11/24 : Xray Chest 1 View- PORTABLE-Urgent (12.11.24 @ 11:37) No acute infiltrate. Cardiomegaly.      MICROBIOLOGY DATA:    Culture - Urine (12.11.24 @ 14:44)   - Ampicillin: R >16 These ampicillin results predict results for amoxicillin  - Ampicillin/Sulbactam: I 16/8  - Aztreonam: R 16  - Cefazolin: R >16 For uncomplicated UTI with K. pneumoniae, E. coli, or P. mirablis: NIDIA <=16 is sensitive and NIDIA >=32 is resistant. This also predicts results for oral agents cefaclor, cefdinir, cefpodoxime, cefprozil, cefuroxime axetil, cephalexin and locarbef for uncomplicated UTI. Note that some isolates may be susceptible to these agents while testing resistant to cefazolin.  - Cefepime: R >16  - Ceftriaxone: R >32  - Cefuroxime: R >16  - Ciprofloxacin: R 1  - Ertapenem: S <=0.5  - Gentamicin: S <=2  - Imipenem: S <=1  - Levofloxacin: S <=0.5  - Meropenem: S <=1  - Nitrofurantoin: S <=32 Should not be used to treat pyelonephritis  - Piperacillin/Tazobactam: S <=8  - Tobramycin: S <=2  - Trimethoprim/Sulfamethoxazole: R >2/38  Specimen Source: Clean Catch  Culture Results:   >100,000 CFU/ml Klebsiella pneumoniae ESBL  Organism Identification: Klebsiella pneumoniae ESBL  Organism: Klebsiella pneumoniae ESBL  Method Type: NIDIA      Culture - Urine (12.11.24 @ 14:44)   Specimen Source: Clean Catch  Culture Results:   >100,000 CFU/ml Klebsiella pneumoniae    Urine Microscopic-Add On (NC) (12.11.24 @ 14:44)   Bacteria: Too Numerous to count /HPF  Squamous Epithelial Cells: Present  Red Blood Cell - Urine: 25 /HPF  White Blood Cell - Urine: 15 /HPF  Hyaline Casts: Present    Culture - Blood (12.11.24 @ 10:00)   - Coagulase negative Staphylococcus: Detec  Gram Stain:   Growth in anaerobic bottle: Gram Positive Cocci in Clusters  Specimen Source: .Blood BLOOD  Organism: Blood Culture PCR  Culture Results:   Growth in anaerobic bottle: Gram Positive Cocci in Clusters   Direct identification is available within approximately 3-5   hours either by Blood Panel Multiplexed PCR or Direct   MALDI-TOF. Details: [email or web address]  Organism Identification: Blood Culture PCR  Method Type: PCR    Culture - Blood (12.11.24 @ 09:  50)   Specimen Source: .Blood BLOOD  Culture Results:   No growth at 4 days    FluA/FluB/RSV/COVID PCR (12.11.24 @ 09:50)   SARS-CoV-2 Result: NotDetec: This Respiratory Panel uses polymerase chain reaction (PCR) to detect for   influenza A; influenza B; and SARS-CoV-2.   This test was validated by [facility] and is in use under the FDA   Emergency Use Authorization (EUA) for clinical labs CLIA-certified to   perform high complexity testing. Test results should be correlated with   clinical presentation, patient history, and epidemiology.           Patient is seen and examined at the bed side, is afebrile. The repeat blood culture from 12/15 have NGTD.      REVIEW OF SYSTEMS: All other review systems are negative      ALLERGIES: losartan (Angioedema)  Chicken (Stomach Upset)      Vital Signs Last 24 Hrs  T(C): 36.8 (17 Dec 2024 07:30), Max: 37 (17 Dec 2024 05:06)  T(F): 98.2 (17 Dec 2024 07:30), Max: 98.6 (17 Dec 2024 05:06)  HR: 78 (17 Dec 2024 07:30) (67 - 99)  BP: 126/84 (17 Dec 2024 07:30) (123/91 - 150/93)  BP(mean): --  RR: 20 (17 Dec 2024 07:30) (18 - 20)  SpO2: 92% (17 Dec 2024 07:30) (92% - 100%)    Parameters below as of 17 Dec 2024 07:30  Patient On (Oxygen Delivery Method): room air      PHYSICAL EXAM:  GENERAL: Not in distress   CHEST/LUNG:  Air entry bilaterally  HEART: s1 and s2 present  ABDOMEN:  Nontender and  Nondistended  EXTREMITIES: No pedal  edema  CNS: Awake and Alert      LABS:                           14.6   7.45  )-----------( 229      ( 17 Dec 2024 07:35 )             44.2                           15.3   5.30  )-----------( 140      ( 15 Dec 2024 12:00 )             46.6         12-17    136  |  97  |  17  ----------------------------<  149[H]  3.4[L]   |  35[H]  |  0.75    Ca    8.6      17 Dec 2024 07:35      12-15    137  |  98  |  19[H]  ----------------------------<  164[H]  3.7   |  31  |  0.74    Ca    8.8      15 Dec 2024 11:12      TPro  7.0  /  Alb  3.2[L]  /  TBili  0.5  /  DBili  x   /  AST  25  /  ALT  20  /  AlkPhos  138[H]  12-13      MEDICATIONS  (STANDING):    anastrozole 1 milliGRAM(s) Oral daily  apixaban 5 milliGRAM(s) Oral every 12 hours  atorvastatin 10 milliGRAM(s) Oral at bedtime  carvedilol 12.5 milliGRAM(s) Oral every 12 hours  famotidine    Tablet 20 milliGRAM(s) Oral two times a day  fluticasone propionate/ salmeterol 100-50 MICROgram(s) Diskus 1 Dose(s) Inhalation two times a day  furosemide    Tablet 40 milliGRAM(s) Oral daily  hydrALAZINE 50 milliGRAM(s) Oral every 8 hours  lidocaine   4% Patch 1 Patch Transdermal every 24 hours  montelukast 10 milliGRAM(s) Oral daily  predniSONE   Tablet 40 milliGRAM(s) Oral daily  tiotropium 2.5 MICROgram(s) Inhaler 2 Puff(s) Inhalation daily      RADIOLOGY & ADDITIONAL TESTS:    12/12/24 : CT Chest No Cont (12.12.24 @ 13:14) No pneumonia. 5 mm right upper lobe nodule appears new as described above. 6-12 month   follow-up noncontrast chest CT can be performed for further evaluation as  clinically warranted.      12/11/24 : Xray Chest 1 View- PORTABLE-Urgent (12.11.24 @ 11:37) No acute infiltrate. Cardiomegaly.      MICROBIOLOGY DATA:    Culture - Blood (12.15.24 @ 12:00)   Specimen Source: .Blood BLOOD  Culture Results:   No growth at 24 hours      Culture - Urine (12.11.24 @ 14:44)   - Ampicillin: R >16 These ampicillin results predict results for amoxicillin  - Ampicillin/Sulbactam: I 16/8  - Aztreonam: R 16  - Cefazolin: R >16 For uncomplicated UTI with K. pneumoniae, E. coli, or P. mirablis: NIDIA <=16 is sensitive and NIDIA >=32 is resistant. This also predicts results for oral agents cefaclor, cefdinir, cefpodoxime, cefprozil, cefuroxime axetil, cephalexin and locarbef for uncomplicated UTI. Note that some isolates may be susceptible to these agents while testing resistant to cefazolin.  - Cefepime: R >16  - Ceftriaxone: R >32  - Cefuroxime: R >16  - Ciprofloxacin: R 1  - Ertapenem: S <=0.5  - Gentamicin: S <=2  - Imipenem: S <=1  - Levofloxacin: S <=0.5  - Meropenem: S <=1  - Nitrofurantoin: S <=32 Should not be used to treat pyelonephritis  - Piperacillin/Tazobactam: S <=8  - Tobramycin: S <=2  - Trimethoprim/Sulfamethoxazole: R >2/38  Specimen Source: Clean Catch  Culture Results:   >100,000 CFU/ml Klebsiella pneumoniae ESBL  Organism Identification: Klebsiella pneumoniae ESBL  Organism: Klebsiella pneumoniae ESBL  Method Type: NIDIA      Culture - Urine (12.11.24 @ 14:44)   Specimen Source: Clean Catch  Culture Results:   >100,000 CFU/ml Klebsiella pneumoniae    Urine Microscopic-Add On (NC) (12.11.24 @ 14:44)   Bacteria: Too Numerous to count /HPF  Squamous Epithelial Cells: Present  Red Blood Cell - Urine: 25 /HPF  White Blood Cell - Urine: 15 /HPF  Hyaline Casts: Present    Culture - Blood (12.11.24 @ 10:00)   - Coagulase negative Staphylococcus: Detec  Gram Stain:   Growth in anaerobic bottle: Gram Positive Cocci in Clusters  Specimen Source: .Blood BLOOD  Organism: Blood Culture PCR  Culture Results:   Growth in anaerobic bottle: Gram Positive Cocci in Clusters   Direct identification is available within approximately 3-5   hours either by Blood Panel Multiplexed PCR or Direct   MALDI-TOF. Details: [email or web address]  Organism Identification: Blood Culture PCR  Method Type: PCR    Culture - Blood (12.11.24 @ 09:  50)   Specimen Source: .Blood BLOOD  Culture Results:   No growth at 4 days    FluA/FluB/RSV/COVID PCR (12.11.24 @ 09:50)   SARS-CoV-2 Result: NotDetec: This Respiratory Panel uses polymerase chain reaction (PCR) to detect for   influenza A; influenza B; and SARS-CoV-2.   This test was validated by [facility] and is in use under the FDA   Emergency Use Authorization (EUA) for clinical labs CLIA-certified to   perform high complexity testing. Test results should be correlated with   clinical presentation, patient history, and epidemiology.

## 2024-12-17 NOTE — PROGRESS NOTE ADULT - SUBJECTIVE AND OBJECTIVE BOX
NP Note discussed with  primary attending    Patient is a 89y old  Female who presents with a chief complaint of Influenza, COPD  exacerbation (17 Dec 2024 12:12)      INTERVAL HPI/OVERNIGHT EVENTS: no new complaints    MEDICATIONS  (STANDING):  anastrozole 1 milliGRAM(s) Oral daily  apixaban 5 milliGRAM(s) Oral every 12 hours  atorvastatin 10 milliGRAM(s) Oral at bedtime  carvedilol 12.5 milliGRAM(s) Oral every 12 hours  ertapenem  IVPB 1000 milliGRAM(s) IV Intermittent every 24 hours  famotidine    Tablet 20 milliGRAM(s) Oral two times a day  fluticasone propionate/ salmeterol 100-50 MICROgram(s) Diskus 1 Dose(s) Inhalation two times a day  furosemide    Tablet 40 milliGRAM(s) Oral daily  hydrALAZINE 50 milliGRAM(s) Oral every 8 hours  lidocaine   4% Patch 1 Patch Transdermal every 24 hours  montelukast 10 milliGRAM(s) Oral daily  predniSONE   Tablet 40 milliGRAM(s) Oral daily  tiotropium 2.5 MICROgram(s) Inhaler 2 Puff(s) Inhalation daily    MEDICATIONS  (PRN):  acetaminophen     Tablet .. 650 milliGRAM(s) Oral every 6 hours PRN Temp greater or equal to 38C (100.4F), Mild Pain (1 - 3)  albuterol    90 MICROgram(s) HFA Inhaler 2 Puff(s) Inhalation every 6 hours PRN for bronchospasm  ondansetron Injectable 4 milliGRAM(s) IV Push every 8 hours PRN Nausea and/or Vomiting  traMADol 25 milliGRAM(s) Oral every 8 hours PRN Severe Pain (7 - 10)      __________________________________________________  REVIEW OF SYSTEMS:    CONSTITUTIONAL: No fever,   EYES: no acute visual disturbances  NECK: No pain or stiffness  RESPIRATORY: No cough; No shortness of breath  CARDIOVASCULAR: No chest pain, no palpitations  GASTROINTESTINAL: No pain. No nausea or vomiting; No diarrhea   NEUROLOGICAL: No headache or numbness, no tremors  MUSCULOSKELETAL: No joint pain, no muscle pain  GENITOURINARY: no dysuria, no frequency, no hesitancy  PSYCHIATRY: no depression , no anxiety  ALL OTHER  ROS negative        Vital Signs Last 24 Hrs  T(C): 36.4 (17 Dec 2024 11:21), Max: 37 (17 Dec 2024 05:06)  T(F): 97.5 (17 Dec 2024 11:21), Max: 98.6 (17 Dec 2024 05:06)  HR: 80 (17 Dec 2024 11:21) (74 - 93)  BP: 121/81 (17 Dec 2024 11:21) (121/81 - 146/90)  BP(mean): 95 (17 Dec 2024 11:21) (95 - 95)  RR: 18 (17 Dec 2024 11:21) (18 - 20)  SpO2: 100% (17 Dec 2024 11:21) (92% - 100%)    Parameters below as of 17 Dec 2024 11:21  Patient On (Oxygen Delivery Method): room air        ________________________________________________  PHYSICAL EXAM:  GENERAL: NAD  HEENT: Normocephalic;  conjunctivae and sclerae clear; moist mucous membranes;   NECK : supple  CHEST/LUNG: Clear to ausculitation bilaterally with good air entry   HEART: S1 S2  regular; no murmurs, gallops or rubs  ABDOMEN: Soft, Nontender, Nondistended; Bowel sounds present  EXTREMITIES: no cyanosis; no edema; no calf tenderness  SKIN: warm and dry; no rash  NERVOUS SYSTEM:  Awake and alert; Oriented  to place, person and time ; no new deficits    _________________________________________________  LABS:                        14.6   7.45  )-----------( 229      ( 17 Dec 2024 07:35 )             44.2     12-17    136  |  97  |  17  ----------------------------<  149[H]  3.4[L]   |  35[H]  |  0.75    Ca    8.6      17 Dec 2024 07:35        Urinalysis Basic - ( 17 Dec 2024 07:35 )    Color: x / Appearance: x / SG: x / pH: x  Gluc: 149 mg/dL / Ketone: x  / Bili: x / Urobili: x   Blood: x / Protein: x / Nitrite: x   Leuk Esterase: x / RBC: x / WBC x   Sq Epi: x / Non Sq Epi: x / Bacteria: x      CAPILLARY BLOOD GLUCOSE            RADIOLOGY & ADDITIONAL TESTS:  < from: CT Chest No Cont (12.12.24 @ 13:14) >    IMPRESSION:    No pneumonia.    5 mm right upper lobe nodule appears new as described above. 6-12 month   follow-up noncontrast chest CT can be performed for further evaluation as   clinically warranted.      < end of copied text >    Imaging Personally Reviewed:  YES    Consultant(s) Notes Reviewed:   YES    Care Discussed with Consultants :     Plan of care was discussed with patient and /or primary care giver; all questions and concerns were addressed and care was aligned with patient's wishes.

## 2024-12-17 NOTE — PROGRESS NOTE ADULT - ASSESSMENT
Patient is a 89y old  Female  with morbid obesity,  ambulates with walker, with PMH of HTN, HLD, COPD (never smoker, on 2L O2 at home), BIPAP at night , HFpEF (EF 55-60%), severe RV, Afib on Eliquis, Pulmonary HTN, now presents to the ER for evaluation of worsening dry cough, SOB at rest and on exertion, fever, chills, and body aches. Patient endorses sick contact with daughter who might currently have the flu. Patient did not receive the flu vaccine this season. On admission, she found to have Fever, positive Influenza A and positive Urine analysis. She has started on Tamiflu and Azithromycin, and the ID consult requested to assist with further evaluation and antibiotic management.    # Influenza A  # UTI  # Bacteremia -Culture - Blood (12.11.24 @ 10:00) - Coagulase negative Staphylococcus- most likely a contaminant     would recommend:    1. Follow up Repeat Blood culture from 12/15 to document clearing the Blood stream  2. Continue Ertapenem to cover ESBL Isolates   3. Continue Tamiflu X 5 days  4. Droplet precaution    Attending Attestation:    Spent more than 35 minutes on total encounter, more than 50 % of the visit was spent counseling and/or coordinating care by the Attending physician.   Patient is a 89y old  Female  with morbid obesity,  ambulates with walker, with PMH of HTN, HLD, COPD (never smoker, on 2L O2 at home), BIPAP at night , HFpEF (EF 55-60%), severe RV, Afib on Eliquis, Pulmonary HTN, now presents to the ER for evaluation of worsening dry cough, SOB at rest and on exertion, fever, chills, and body aches. Patient endorses sick contact with daughter who might currently have the flu. Patient did not receive the flu vaccine this season. On admission, she found to have Fever, positive Influenza A and positive Urine analysis. She has started on Tamiflu and Azithromycin, and the ID consult requested to assist with further evaluation and antibiotic management.    # Influenza A - s/p completed the course of Tamiflu  # UTI - 12/11/24- urine cx grew >100,000 CFU/ml Klebsiella pneumoniae ESBL  # Bacteremia -Culture  Blood (12.11.24 @ 10:00) - Coagulase negative Staphylococcus- most likely a contaminant - Repeat blood cx from 12/15 have NGTD    would recommend:    1. Continue Ertapenem to cover ESBL  Isolates until 12/18/24  2. OOB to chair   3. Aspiration precaution    Attending Attestation:    Spent more than 35 minutes on total encounter, more than 50 % of the visit was spent counseling and/or coordinating care by the Attending physician.

## 2024-12-18 ENCOUNTER — TRANSCRIPTION ENCOUNTER (OUTPATIENT)
Age: 88
End: 2024-12-18

## 2024-12-18 VITALS
OXYGEN SATURATION: 98 % | DIASTOLIC BLOOD PRESSURE: 78 MMHG | SYSTOLIC BLOOD PRESSURE: 129 MMHG | RESPIRATION RATE: 19 BRPM | HEART RATE: 80 BPM | TEMPERATURE: 98 F

## 2024-12-18 LAB
ANION GAP SERPL CALC-SCNC: 2 MMOL/L — LOW (ref 5–17)
BUN SERPL-MCNC: 17 MG/DL — SIGNIFICANT CHANGE UP (ref 7–18)
CALCIUM SERPL-MCNC: 8.4 MG/DL — SIGNIFICANT CHANGE UP (ref 8.4–10.5)
CHLORIDE SERPL-SCNC: 96 MMOL/L — SIGNIFICANT CHANGE UP (ref 96–108)
CO2 SERPL-SCNC: 40 MMOL/L — HIGH (ref 22–31)
CREAT SERPL-MCNC: 0.73 MG/DL — SIGNIFICANT CHANGE UP (ref 0.5–1.3)
EGFR: 77 ML/MIN/1.73M2 — SIGNIFICANT CHANGE UP
GLUCOSE SERPL-MCNC: 170 MG/DL — HIGH (ref 70–99)
POTASSIUM SERPL-MCNC: 3.7 MMOL/L — SIGNIFICANT CHANGE UP (ref 3.5–5.3)
POTASSIUM SERPL-SCNC: 3.7 MMOL/L — SIGNIFICANT CHANGE UP (ref 3.5–5.3)
SODIUM SERPL-SCNC: 138 MMOL/L — SIGNIFICANT CHANGE UP (ref 135–145)

## 2024-12-18 PROCEDURE — 83605 ASSAY OF LACTIC ACID: CPT

## 2024-12-18 PROCEDURE — 80048 BASIC METABOLIC PNL TOTAL CA: CPT

## 2024-12-18 PROCEDURE — 80053 COMPREHEN METABOLIC PANEL: CPT

## 2024-12-18 PROCEDURE — 84100 ASSAY OF PHOSPHORUS: CPT

## 2024-12-18 PROCEDURE — 94640 AIRWAY INHALATION TREATMENT: CPT

## 2024-12-18 PROCEDURE — 93005 ELECTROCARDIOGRAM TRACING: CPT

## 2024-12-18 PROCEDURE — 83735 ASSAY OF MAGNESIUM: CPT

## 2024-12-18 PROCEDURE — 87637 SARSCOV2&INF A&B&RSV AMP PRB: CPT

## 2024-12-18 PROCEDURE — 85027 COMPLETE CBC AUTOMATED: CPT

## 2024-12-18 PROCEDURE — 87077 CULTURE AEROBIC IDENTIFY: CPT

## 2024-12-18 PROCEDURE — 85610 PROTHROMBIN TIME: CPT

## 2024-12-18 PROCEDURE — 99285 EMERGENCY DEPT VISIT HI MDM: CPT | Mod: 25

## 2024-12-18 PROCEDURE — 81001 URINALYSIS AUTO W/SCOPE: CPT

## 2024-12-18 PROCEDURE — 96374 THER/PROPH/DIAG INJ IV PUSH: CPT

## 2024-12-18 PROCEDURE — 87040 BLOOD CULTURE FOR BACTERIA: CPT

## 2024-12-18 PROCEDURE — 96376 TX/PRO/DX INJ SAME DRUG ADON: CPT

## 2024-12-18 PROCEDURE — 97162 PT EVAL MOD COMPLEX 30 MIN: CPT

## 2024-12-18 PROCEDURE — 85025 COMPLETE CBC W/AUTO DIFF WBC: CPT

## 2024-12-18 PROCEDURE — 87150 DNA/RNA AMPLIFIED PROBE: CPT

## 2024-12-18 PROCEDURE — 71250 CT THORAX DX C-: CPT | Mod: MC

## 2024-12-18 PROCEDURE — 85730 THROMBOPLASTIN TIME PARTIAL: CPT

## 2024-12-18 PROCEDURE — 36415 COLL VENOUS BLD VENIPUNCTURE: CPT

## 2024-12-18 PROCEDURE — 82962 GLUCOSE BLOOD TEST: CPT

## 2024-12-18 PROCEDURE — 82803 BLOOD GASES ANY COMBINATION: CPT

## 2024-12-18 PROCEDURE — 94660 CPAP INITIATION&MGMT: CPT

## 2024-12-18 PROCEDURE — 84484 ASSAY OF TROPONIN QUANT: CPT

## 2024-12-18 PROCEDURE — 87086 URINE CULTURE/COLONY COUNT: CPT

## 2024-12-18 PROCEDURE — 83880 ASSAY OF NATRIURETIC PEPTIDE: CPT

## 2024-12-18 PROCEDURE — 71045 X-RAY EXAM CHEST 1 VIEW: CPT

## 2024-12-18 PROCEDURE — 87186 SC STD MICRODIL/AGAR DIL: CPT

## 2024-12-18 PROCEDURE — 96375 TX/PRO/DX INJ NEW DRUG ADDON: CPT

## 2024-12-18 RX ORDER — HYDRALAZINE HYDROCHLORIDE 10 MG/1
1 TABLET ORAL
Qty: 90 | Refills: 0
Start: 2024-12-18 | End: 2025-01-16

## 2024-12-18 RX ADMIN — FUROSEMIDE 40 MILLIGRAM(S): 40 TABLET ORAL at 05:19

## 2024-12-18 RX ADMIN — LIDOCAINE 1 PATCH: 40 CREAM TOPICAL at 00:05

## 2024-12-18 RX ADMIN — PREDNISONE 40 MILLIGRAM(S): 20 TABLET ORAL at 05:19

## 2024-12-18 RX ADMIN — FAMOTIDINE 20 MILLIGRAM(S): 20 TABLET, FILM COATED ORAL at 05:19

## 2024-12-18 RX ADMIN — MONTELUKAST SODIUM 10 MILLIGRAM(S): 10 TABLET ORAL at 11:05

## 2024-12-18 RX ADMIN — APIXABAN 5 MILLIGRAM(S): 2.5 TABLET, FILM COATED ORAL at 05:19

## 2024-12-18 RX ADMIN — LIDOCAINE 1 PATCH: 40 CREAM TOPICAL at 10:19

## 2024-12-18 RX ADMIN — ERTAPENEM 120 MILLIGRAM(S): 1 INJECTION, POWDER, LYOPHILIZED, FOR SOLUTION INTRAMUSCULAR; INTRAVENOUS at 10:47

## 2024-12-18 RX ADMIN — FLUTICASONE PROPIONATE AND SALMETEROL XINAFOATE 1 DOSE(S): 45; 21 AEROSOL, METERED RESPIRATORY (INHALATION) at 10:17

## 2024-12-18 RX ADMIN — CARVEDILOL 12.5 MILLIGRAM(S): 25 TABLET, FILM COATED ORAL at 05:19

## 2024-12-18 RX ADMIN — Medication 2 PUFF(S): at 11:05

## 2024-12-18 RX ADMIN — HYDRALAZINE HYDROCHLORIDE 50 MILLIGRAM(S): 10 TABLET ORAL at 05:19

## 2024-12-18 RX ADMIN — ANASTROZOLE 1 MILLIGRAM(S): 1 TABLET, COATED ORAL at 11:05

## 2024-12-18 NOTE — PROGRESS NOTE ADULT - SUBJECTIVE AND OBJECTIVE BOX
Patient is a 89y old  Female who presents with a chief complaint of Influenza, COPD  exacerbation (18 Dec 2024 10:11)  Patient is awake, alert, lying in bed in NAD. Doing well on RA.    INTERVAL HPI/OVERNIGHT EVENTS:      VITAL SIGNS:  T(F): 97.9 (12-18-24 @ 04:55)  HR: 69 (12-18-24 @ 04:55)  BP: 128/77 (12-18-24 @ 04:55)  RR: 18 (12-18-24 @ 04:55)  SpO2: 100% (12-18-24 @ 04:55)  Wt(kg): --  I&O's Detail    17 Dec 2024 07:01  -  18 Dec 2024 07:00  --------------------------------------------------------  IN:    Oral Fluid: 275 mL  Total IN: 275 mL    OUT:    Voided (mL): 2250 mL  Total OUT: 2250 mL    Total NET: -1975 mL      18 Dec 2024 07:01  -  18 Dec 2024 11:08  --------------------------------------------------------  IN:    Oral Fluid: 177 mL  Total IN: 177 mL    OUT:  Total OUT: 0 mL    Total NET: 177 mL              REVIEW OF SYSTEMS:    CONSTITUTIONAL:  No fevers, chills, sweats    HEENT:  Eyes:  No diplopia or blurred vision. ENT:  No earache, sore throat or runny nose.    CARDIOVASCULAR:  No pressure, squeezing, tightness, or heaviness about the chest; no palpitations.    RESPIRATORY:  Per HPI    GASTROINTESTINAL:  No abdominal pain, nausea, vomiting or diarrhea.    GENITOURINARY:  No dysuria, frequency or urgency.    NEUROLOGIC:  No paresthesias, fasciculations, seizures or weakness.    PSYCHIATRIC:  No disorder of thought or mood.      PHYSICAL EXAM:    Constitutional: Well developed and nourished  Eyes:Perrla  ENMT: normal  Neck:supple  Respiratory: good air entry  Cardiovascular: S1 S2 regular  Gastrointestinal: Soft, Non tender  Extremities: No edema  Vascular:normal  Neurological:Awake, alert,Ox3  Musculoskeletal:Normal      MEDICATIONS  (STANDING):  anastrozole 1 milliGRAM(s) Oral daily  apixaban 5 milliGRAM(s) Oral every 12 hours  atorvastatin 10 milliGRAM(s) Oral at bedtime  carvedilol 12.5 milliGRAM(s) Oral every 12 hours  ertapenem  IVPB 1000 milliGRAM(s) IV Intermittent every 24 hours  famotidine    Tablet 20 milliGRAM(s) Oral two times a day  fluticasone propionate/ salmeterol 100-50 MICROgram(s) Diskus 1 Dose(s) Inhalation two times a day  furosemide    Tablet 40 milliGRAM(s) Oral daily  hydrALAZINE 50 milliGRAM(s) Oral every 8 hours  lidocaine   4% Patch 1 Patch Transdermal every 24 hours  montelukast 10 milliGRAM(s) Oral daily  predniSONE   Tablet 40 milliGRAM(s) Oral daily  tiotropium 2.5 MICROgram(s) Inhaler 2 Puff(s) Inhalation daily    MEDICATIONS  (PRN):  acetaminophen     Tablet .. 650 milliGRAM(s) Oral every 6 hours PRN Temp greater or equal to 38C (100.4F), Mild Pain (1 - 3)  albuterol    90 MICROgram(s) HFA Inhaler 2 Puff(s) Inhalation every 6 hours PRN for bronchospasm  ondansetron Injectable 4 milliGRAM(s) IV Push every 8 hours PRN Nausea and/or Vomiting  traMADol 25 milliGRAM(s) Oral every 8 hours PRN Severe Pain (7 - 10)      Allergies    losartan (Angioedema)    Intolerances    Chicken (Stomach Upset)      LABS:                        14.6   7.45  )-----------( 229      ( 17 Dec 2024 07:35 )             44.2     12-18    138  |  96  |  17  ----------------------------<  170[H]  3.7   |  40[H]  |  0.73    Ca    8.4      18 Dec 2024 08:15        Urinalysis Basic - ( 18 Dec 2024 08:15 )    Color: x / Appearance: x / SG: x / pH: x  Gluc: 170 mg/dL / Ketone: x  / Bili: x / Urobili: x   Blood: x / Protein: x / Nitrite: x   Leuk Esterase: x / RBC: x / WBC x   Sq Epi: x / Non Sq Epi: x / Bacteria: x      Culture - Blood (12.15.24 @ 12:00)   Specimen Source: .Blood BLOOD  Culture Results:   No growth at 48 HoursCulture - Urine (12.11.24 @ 14:44)   - Ampicillin: R >16 These ampicillin results predict results for amoxicillin  - Ampicillin/Sulbactam: I 16/8  - Aztreonam: R 16  - Cefazolin: R >16 For uncomplicated UTI with K. pneumoniae, E. coli, or P. mirablis: NIDIA <=16 is sensitive and NIDIA >=32 is resistant. This also predicts results for oral agents cefaclor, cefdinir, cefpodoxime, cefprozil, cefuroxime axetil, cephalexin and locarbef for uncomplicated UTI. Note that some isolates may be susceptible to these agents while testing resistant to cefazolin.  - Cefepime: R >16  - Ceftriaxone: R >32  - Cefuroxime: R >16  - Ciprofloxacin: R 1  - Ertapenem: S <=0.5  - Gentamicin: S <=2  - Imipenem: S <=1  - Levofloxacin: S <=0.5  - Meropenem: S <=1  - Nitrofurantoin: S <=32 Should not be used to treat pyelonephritis  - Piperacillin/Tazobactam: S <=8  - Tobramycin: S <=2  - Trimethoprim/Sulfamethoxazole: R >2/38  Specimen Source: Clean Catch  Culture Results:   >100,000 CFU/ml Klebsiella pneumoniae ESBL  Organism Identification: Klebsiella pneumoniae ESBL  Organism: Klebsiella pneumoniae ESBL  Method Type: NIDIA      CAPILLARY BLOOD GLUCOSE            RADIOLOGY & ADDITIONAL TESTS:    CXR:    < from: Xray Chest 1 View- PORTABLE-Urgent (12.11.24 @ 11:37) >  IMPRESSION:    No acute infiltrate. Cardiomegaly.    --- End of Report ---    < end of copied text >      Ct scan chest:  < from: CT Chest No Cont (12.12.24 @ 13:14) >  IMPRESSION:    No pneumonia.    5 mm right upper lobe nodule appears new as described above. 6-12 month   follow-up noncontrast chest CT can be performed for further evaluation as   clinically warranted.    --- End of Report ---    < end of copied text >  ekg;    echo: Patient is a 89y old  Female who presents with a chief complaint of Influenza, COPD  exacerbation (18 Dec 2024 10:11)  Patient is awake, alert, lying in bed in NAD. Doing well on RA. Clinically stable    INTERVAL HPI/OVERNIGHT EVENTS:      VITAL SIGNS:  T(F): 97.9 (12-18-24 @ 04:55)  HR: 69 (12-18-24 @ 04:55)  BP: 128/77 (12-18-24 @ 04:55)  RR: 18 (12-18-24 @ 04:55)  SpO2: 100% (12-18-24 @ 04:55)  Wt(kg): --  I&O's Detail    17 Dec 2024 07:01  -  18 Dec 2024 07:00  --------------------------------------------------------  IN:    Oral Fluid: 275 mL  Total IN: 275 mL    OUT:    Voided (mL): 2250 mL  Total OUT: 2250 mL    Total NET: -1975 mL      18 Dec 2024 07:01  -  18 Dec 2024 11:08  --------------------------------------------------------  IN:    Oral Fluid: 177 mL  Total IN: 177 mL    OUT:  Total OUT: 0 mL    Total NET: 177 mL              REVIEW OF SYSTEMS:    CONSTITUTIONAL:  No fevers, chills, sweats    HEENT:  Eyes:  No diplopia or blurred vision. ENT:  No earache, sore throat or runny nose.    CARDIOVASCULAR:  No pressure, squeezing, tightness, or heaviness about the chest; no palpitations.    RESPIRATORY:  Per HPI    GASTROINTESTINAL:  No abdominal pain, nausea, vomiting or diarrhea.    GENITOURINARY:  No dysuria, frequency or urgency.    NEUROLOGIC:  No paresthesias, fasciculations, seizures or weakness.    PSYCHIATRIC:  No disorder of thought or mood.      PHYSICAL EXAM:    Constitutional: Well developed and nourished  Eyes:Perrla  ENMT: normal  Neck:supple  Respiratory: good air entry  Cardiovascular: S1 S2 regular  Gastrointestinal: Soft, Non tender  Extremities: No edema  Vascular:normal  Neurological:Awake, alert,Ox3  Musculoskeletal:Normal      MEDICATIONS  (STANDING):  anastrozole 1 milliGRAM(s) Oral daily  apixaban 5 milliGRAM(s) Oral every 12 hours  atorvastatin 10 milliGRAM(s) Oral at bedtime  carvedilol 12.5 milliGRAM(s) Oral every 12 hours  ertapenem  IVPB 1000 milliGRAM(s) IV Intermittent every 24 hours  famotidine    Tablet 20 milliGRAM(s) Oral two times a day  fluticasone propionate/ salmeterol 100-50 MICROgram(s) Diskus 1 Dose(s) Inhalation two times a day  furosemide    Tablet 40 milliGRAM(s) Oral daily  hydrALAZINE 50 milliGRAM(s) Oral every 8 hours  lidocaine   4% Patch 1 Patch Transdermal every 24 hours  montelukast 10 milliGRAM(s) Oral daily  predniSONE   Tablet 40 milliGRAM(s) Oral daily  tiotropium 2.5 MICROgram(s) Inhaler 2 Puff(s) Inhalation daily    MEDICATIONS  (PRN):  acetaminophen     Tablet .. 650 milliGRAM(s) Oral every 6 hours PRN Temp greater or equal to 38C (100.4F), Mild Pain (1 - 3)  albuterol    90 MICROgram(s) HFA Inhaler 2 Puff(s) Inhalation every 6 hours PRN for bronchospasm  ondansetron Injectable 4 milliGRAM(s) IV Push every 8 hours PRN Nausea and/or Vomiting  traMADol 25 milliGRAM(s) Oral every 8 hours PRN Severe Pain (7 - 10)      Allergies    losartan (Angioedema)    Intolerances    Chicken (Stomach Upset)      LABS:                        14.6   7.45  )-----------( 229      ( 17 Dec 2024 07:35 )             44.2     12-18    138  |  96  |  17  ----------------------------<  170[H]  3.7   |  40[H]  |  0.73    Ca    8.4      18 Dec 2024 08:15        Urinalysis Basic - ( 18 Dec 2024 08:15 )    Color: x / Appearance: x / SG: x / pH: x  Gluc: 170 mg/dL / Ketone: x  / Bili: x / Urobili: x   Blood: x / Protein: x / Nitrite: x   Leuk Esterase: x / RBC: x / WBC x   Sq Epi: x / Non Sq Epi: x / Bacteria: x      Culture - Blood (12.15.24 @ 12:00)   Specimen Source: .Blood BLOOD  Culture Results:   No growth at 48 HoursCulture - Urine (12.11.24 @ 14:44)   - Ampicillin: R >16 These ampicillin results predict results for amoxicillin  - Ampicillin/Sulbactam: I 16/8  - Aztreonam: R 16  - Cefazolin: R >16 For uncomplicated UTI with K. pneumoniae, E. coli, or P. mirablis: NIDIA <=16 is sensitive and NIDIA >=32 is resistant. This also predicts results for oral agents cefaclor, cefdinir, cefpodoxime, cefprozil, cefuroxime axetil, cephalexin and locarbef for uncomplicated UTI. Note that some isolates may be susceptible to these agents while testing resistant to cefazolin.  - Cefepime: R >16  - Ceftriaxone: R >32  - Cefuroxime: R >16  - Ciprofloxacin: R 1  - Ertapenem: S <=0.5  - Gentamicin: S <=2  - Imipenem: S <=1  - Levofloxacin: S <=0.5  - Meropenem: S <=1  - Nitrofurantoin: S <=32 Should not be used to treat pyelonephritis  - Piperacillin/Tazobactam: S <=8  - Tobramycin: S <=2  - Trimethoprim/Sulfamethoxazole: R >2/38  Specimen Source: Clean Catch  Culture Results:   >100,000 CFU/ml Klebsiella pneumoniae ESBL  Organism Identification: Klebsiella pneumoniae ESBL  Organism: Klebsiella pneumoniae ESBL  Method Type: NIDIA      CAPILLARY BLOOD GLUCOSE            RADIOLOGY & ADDITIONAL TESTS:    CXR:    < from: Xray Chest 1 View- PORTABLE-Urgent (12.11.24 @ 11:37) >  IMPRESSION:    No acute infiltrate. Cardiomegaly.    --- End of Report ---    < end of copied text >      Ct scan chest:  < from: CT Chest No Cont (12.12.24 @ 13:14) >  IMPRESSION:    No pneumonia.    5 mm right upper lobe nodule appears new as described above. 6-12 month   follow-up noncontrast chest CT can be performed for further evaluation as   clinically warranted.    --- End of Report ---    < end of copied text >  ekg;    echo:

## 2024-12-18 NOTE — PROGRESS NOTE ADULT - NUTRITIONAL ASSESSMENT
This patient has been assessed with a concern for Malnutrition and has been determined to have a diagnosis/diagnoses of Morbid obesity (BMI > 40).    This patient is being managed with:   Diet Regular-  DASH/TLC {Sodium & Cholesterol Restricted}  Entered: Dec 11 2024  4:12PM    The following pending diet order is being considered for treatment of Morbid obesity (BMI > 40):  Diet Regular-  DASH/TLC {Sodium & Cholesterol Restricted}  No Egg  No Poultry  Entered: Dec 13 2024 11:57AM  
This patient has been assessed with a concern for Malnutrition and has been determined to have a diagnosis/diagnoses of Morbid obesity (BMI > 40).    This patient is being managed with:   Diet Regular-  DASH/TLC {Sodium & Cholesterol Restricted}  No Egg  No Poultry  Entered: Dec 13 2024 11:57AM  

## 2024-12-18 NOTE — DISCHARGE NOTE NURSING/CASE MANAGEMENT/SOCIAL WORK - FINANCIAL ASSISTANCE
[facility] provides services at a reduced cost to those who are determined to be eligible through [facility]’s financial assistance program. Information regarding [facility]’s financial assistance program can be found by going to [email or web address] or by calling [phone].

## 2024-12-18 NOTE — PROGRESS NOTE ADULT - PROBLEM SELECTOR PLAN 11
Check cultures noted   Antibiotics  ID follow up  Contact precautions
Check cultures noted   Antibiotics  ID follow up  Contact precautions
pt is from home  Now on PO Prednisone  PT [facility]  Needs Ertapenem until 12/18
Check cultures noted   Antibiotics  ID follow up  Contact precautions
pt is from home  Now on PO Prednisone  Needs PT eval  likely DC tomorrow 12/17 pending repeat bcx
Check cultures noted   Antibiotics  ID follow up  Contact precautions
pt is from home  on steroid taper  likely DC over the weekend on steroid taper and with better BP control

## 2024-12-18 NOTE — PROGRESS NOTE ADULT - PROBLEM SELECTOR PLAN 2
Hx of COPD (never smoker, on 2L O2 at home), BIPAP at night  Currently on Trellegy Ellipta 100mcg-62,5mcg  QD, Albuterol PRN   Presents with 1.5 weeks history  dry cough, SOB at rest and on exertion that has progressively worsened.   - O2 support as needed, maintain O2 sat 88-92%, avoid over oxygenation  - solumedrol 40q8, taper down  - Started on Duoneb, Symbicort,, Albuterol prn  - Azithromycin 500qd  - Incentive spirometer
- Hx of COPD (never smoker, on 2L O2 at home), BIPAP at night  - on Trellegy Ellipta 100mcg-62,5mcg  QD, Albuterol PRN at home  - p/w 1.5 weeks history  dry cough, SOB at rest and on exertion that has progressively worsened. Was placed on NRM in ED  - not wheezing   - Switch IV solumedrol to Prednisone 40mg qd  - continue Advair, spiriva, Albuterol prn  - pt now on room air with adequate saturation
Bronchodilators  Advair diskus 250/50 mcgs one inh po BID  PFTs as OP
- Hx of COPD (never smoker, on 2L O2 at home), BIPAP at night  - Currently on Trellegy Ellipta 100mcg-62,5mcg  QD, Albuterol PRN   - p/w 1.5 weeks history  dry cough, SOB at rest and on exertion that has progressively worsened. Was placed on NRM in ED  - not wheezing   - continue solumedrol 40q8 as per pulm  - continue Advair, spiriva, Albuterol prn  - continue ceftriaxone/azithromycin  - pt now on room air with adequate saturation
- Hx of COPD (never smoker, on 2L O2 at home), BIPAP at night  - on Trellegy Ellipta 100mcg-62,5mcg  QD, Albuterol PRN at home  - p/w 1.5 weeks history  dry cough, SOB at rest and on exertion that has progressively worsened. Was placed on NRM in ED  - not wheezing   - continue solumedrol as per pulm, taper to Q12 today  - continue Advair, spiriva, Albuterol prn  - continue ceftriaxone/azithromycin  - pt now on room air with adequate saturation
Bronchodilators  Advair diskus 250/50 mcgs one inh po BID  PFTs as OP
- Hx of COPD (never smoker, on 2L O2 at home), BIPAP at night  - Currently on Trellegy Ellipta 100mcg-62,5mcg  QD, Albuterol PRN   - p/w 1.5 weeks history  dry cough, SOB at rest and on exertion that has progressively worsened. Was placed on NRM in ED  - not wheezing   - continue solumedrol 40q8 as per pulm  - continue Advair, spiriva, Albuterol prn  - continue ceftriaxone/azithromycin  - pt now on room air with adequate saturation
Bronchodilators  Advair diskus 250/50 mcgs one inh po BID  PFTs as OP
- Hx of COPD (never smoker, on 2L O2 at home), BIPAP at night  - on Trellegy Ellipta 100mcg-62,5mcg  QD, Albuterol PRN at home  - p/w 1.5 weeks history  dry cough, SOB at rest and on exertion that has progressively worsened. Was placed on NRM in ED  - not wheezing   - Switch IV solumedrol to Prednisone 40mg qd  - continue Advair, spiriva, Albuterol prn  - pt now on room air with adequate saturation

## 2024-12-18 NOTE — PROGRESS NOTE ADULT - ASSESSMENT
89 yo F, w/ morbid obesity,  ambulates with walker, with PMH of HTN, HLD, COPD (never smoker, on 2L O2 at home), BIPAP at night , HFpEF (EF 55-60%), Breast ca, Chronic Afib on Eliquis, Pulmonary HTN, presents with 1.5 weeks history  dry cough, SOB at rest and on exertion that has progressively worsened,pneumonia,Flu with borderline troponins,UTI-ESBL,Blood cx-coag - staph probable contaminant.  1.Flu-abx completed.  2.Chronic afib-eliquis,coreg.  3.COPD-MDI,steroids.  4.Breast ca-anastrozole.  5.Rt sided CHF-lasix.  6.Bipap at night.  7.CT chest shows pulm nodule-repeat 6-12 mo.  8.HTN- hydralazine 50mg tid.  9.Knee pain lidocaine patch.  10.UTI-ESBL-abx as per ID.

## 2024-12-18 NOTE — PROGRESS NOTE ADULT - ASSESSMENT
89 yo F, w/ morbid obesity,  ambulates with walker, with PMH of HTN, HLD, COPD (never smoker, on 2L O2 at home), BIPAP at night , HFpEF (EF 55-60%), Breast ca, Chronic Afib on Eliquis, Pulmonary HTN, presents with 1.5 weeks history  dry cough, SOB at rest and on exertion that has progressively worsened,pneumonia,Flu with borderline troponins,UTI-ESBL,Blood cx-coag - staph probable contaminant.  1.Flu-abx completed.  2.Chronic afib-eliquis,coreg.  3.COPD-MDI,steroids.  4.Breast ca-anastrozole.  5.Rt sided CHF-lasix.  6.Bipap at night.  7.CT chest shows pulm nodule-repeat 6-12 mo.  8.HTN- hydralazine 50mg tid.  9.Knee pain lidocaine patch.  10.UTI-ESBL-abx as per ID.

## 2024-12-18 NOTE — PROGRESS NOTE ADULT - PROBLEM/PLAN-10
DISPLAY PLAN FREE TEXT
numerical 0-10
DISPLAY PLAN FREE TEXT

## 2024-12-18 NOTE — DISCHARGE NOTE NURSING/CASE MANAGEMENT/SOCIAL WORK - NSDCPEFALRISK_GEN_ALL_CORE
For information on Fall & Injury Prevention, visit: [email or web address] OR  [email or web address] OR  https://www.cdc.gov/steadi/patient.html

## 2024-12-18 NOTE — PROGRESS NOTE ADULT - PROBLEM SELECTOR PROBLEM 3
Lung nodule
Influenza A
Influenza A
Lung nodule
Influenza A
Lung nodule
Lung nodule
Influenza A

## 2024-12-18 NOTE — DISCHARGE NOTE NURSING/CASE MANAGEMENT/SOCIAL WORK - NSDCVIVACCINE_GEN_ALL_CORE_FT
influenza, injectable, quadrivalent, preservative free; 27-Oct-2017 14:11; Brandy Joaquin (RN); [employer]; [id number]; IntraMuscular; Deltoid Left.; 0.5 milliLiter(s); VIS (VIS Published: 07-Aug-2015, VIS Presented: 27-Oct-2017);   influenza, high-dose, quadrivalent; 18-Nov-2021 06:24; Yemi Santana (WINTER); [employer]; [id number] (Exp. Date: 30-Jun-2022); IntraMuscular; Deltoid Left.; 0.7 milliLiter(s); VIS (VIS Published: 06-Aug-2021, VIS Presented: 18-Nov-2021);

## 2024-12-18 NOTE — PROGRESS NOTE ADULT - REASON FOR ADMISSION
Influenza, COPD  exacerbation
Yes
Influenza, COPD  exacerbation

## 2024-12-18 NOTE — PROGRESS NOTE ADULT - PROBLEM SELECTOR PROBLEM 11
Discharge planning issues
UTI due to Klebsiella species
Discharge planning issues
UTI due to Klebsiella species
Discharge planning issues

## 2024-12-18 NOTE — PROGRESS NOTE ADULT - PROVIDER SPECIALTY LIST ADULT
Cardiology
Infectious Disease
Internal Medicine
Cardiology
Infectious Disease
Internal Medicine
Cardiology
Infectious Disease
Internal Medicine
Cardiology
Infectious Disease
Internal Medicine
Internal Medicine
Pulmonology
Internal Medicine
Pulmonology
Pulmonology
Internal Medicine
Pulmonology
Internal Medicine
Pulmonology
Pulmonology

## 2024-12-18 NOTE — PROGRESS NOTE ADULT - PROBLEM SELECTOR PROBLEM 4
Pulmonary HTN
UTI (urinary tract infection)
Pulmonary HTN
UTI (urinary tract infection)
Pulmonary HTN
UTI (urinary tract infection)
UTI (urinary tract infection)

## 2024-12-18 NOTE — PROGRESS NOTE ADULT - SUBJECTIVE AND OBJECTIVE BOX
Patient is seen and examined at the bed side, is afebrile. No over night events, tolerating Abx well.      REVIEW OF SYSTEMS: All other review systems are negative      ALLERGIES: losartan (Angioedema)  Chicken (Stomach Upset)      Vital Signs Last 24 Hrs  T(C): 36.6 (18 Dec 2024 04:55), Max: 36.8 (17 Dec 2024 15:48)  T(F): 97.9 (18 Dec 2024 04:55), Max: 98.2 (17 Dec 2024 15:48)  HR: 69 (18 Dec 2024 04:55) (69 - 85)  BP: 128/77 (18 Dec 2024 04:55) (108/66 - 128/77)  BP(mean): 95 (17 Dec 2024 11:21) (95 - 95)  RR: 18 (18 Dec 2024 04:55) (18 - 18)  SpO2: 100% (18 Dec 2024 04:55) (94% - 100%)    Parameters below as of 18 Dec 2024 04:55  Patient On (Oxygen Delivery Method): nasal cannula  O2 Flow (L/min): 2      PHYSICAL EXAM:  GENERAL: Not in distress   CHEST/LUNG:  Air entry bilaterally  HEART: s1 and s2 present  ABDOMEN:  Nontender and  Nondistended  EXTREMITIES: No pedal  edema  CNS: Awake and Alert      LABS: No new labs yet                        14.6   7.45  )-----------( 229      ( 17 Dec 2024 07:35 )             44.2                  15.3   5.30  )-----------( 140      ( 15 Dec 2024 12:00 )             46.6         12-17    136  |  97  |  17  ----------------------------<  149[H]  3.4[L]   |  35[H]  |  0.75    Ca    8.6      17 Dec 2024 07:35      12-15    137  |  98  |  19[H]  ----------------------------<  164[H]  3.7   |  31  |  0.74    Ca    8.8      15 Dec 2024 11:12      TPro  7.0  /  Alb  3.2[L]  /  TBili  0.5  /  DBili  x   /  AST  25  /  ALT  20  /  AlkPhos  138[H]  12-13      MEDICATIONS  (STANDING):    anastrozole 1 milliGRAM(s) Oral daily  apixaban 5 milliGRAM(s) Oral every 12 hours  atorvastatin 10 milliGRAM(s) Oral at bedtime  carvedilol 12.5 milliGRAM(s) Oral every 12 hours  ertapenem  IVPB 1000 milliGRAM(s) IV Intermittent every 24 hours  famotidine    Tablet 20 milliGRAM(s) Oral two times a day  fluticasone propionate/ salmeterol 100-50 MICROgram(s) Diskus 1 Dose(s) Inhalation two times a day  furosemide    Tablet 40 milliGRAM(s) Oral daily  hydrALAZINE 50 milliGRAM(s) Oral every 8 hours  lidocaine   4% Patch 1 Patch Transdermal every 24 hours  montelukast 10 milliGRAM(s) Oral daily  predniSONE   Tablet 40 milliGRAM(s) Oral daily  tiotropium 2.5 MICROgram(s) Inhaler 2 Puff(s) Inhalation daily      RADIOLOGY & ADDITIONAL TESTS:    12/12/24 : CT Chest No Cont (12.12.24 @ 13:14) No pneumonia. 5 mm right upper lobe nodule appears new as described above. 6-12 month   follow-up noncontrast chest CT can be performed for further evaluation as  clinically warranted.      12/11/24 : Xray Chest 1 View- PORTABLE-Urgent (12.11.24 @ 11:37) No acute infiltrate. Cardiomegaly.      MICROBIOLOGY DATA:    Culture - Blood (12.15.24 @ 12:00)   Specimen Source: .Blood BLOOD  Culture Results:   No growth at 48 hours      Culture - Urine (12.11.24 @ 14:44)   - Ampicillin: R >16 These ampicillin results predict results for amoxicillin  - Ampicillin/Sulbactam: I 16/8  - Aztreonam: R 16  - Cefazolin: R >16 For uncomplicated UTI with K. pneumoniae, E. coli, or P. mirablis: NIDIA <=16 is sensitive and NIDIA >=32 is resistant. This also predicts results for oral agents cefaclor, cefdinir, cefpodoxime, cefprozil, cefuroxime axetil, cephalexin and locarbef for uncomplicated UTI. Note that some isolates may be susceptible to these agents while testing resistant to cefazolin.  - Cefepime: R >16  - Ceftriaxone: R >32  - Cefuroxime: R >16  - Ciprofloxacin: R 1  - Ertapenem: S <=0.5  - Gentamicin: S <=2  - Imipenem: S <=1  - Levofloxacin: S <=0.5  - Meropenem: S <=1  - Nitrofurantoin: S <=32 Should not be used to treat pyelonephritis  - Piperacillin/Tazobactam: S <=8  - Tobramycin: S <=2  - Trimethoprim/Sulfamethoxazole: R >2/38  Specimen Source: Clean Catch  Culture Results:   >100,000 CFU/ml Klebsiella pneumoniae ESBL  Organism Identification: Klebsiella pneumoniae ESBL  Organism: Klebsiella pneumoniae ESBL  Method Type: NIDIA      Culture - Urine (12.11.24 @ 14:44)   Specimen Source: Clean Catch  Culture Results:   >100,000 CFU/ml Klebsiella pneumoniae    Urine Microscopic-Add On (NC) (12.11.24 @ 14:44)   Bacteria: Too Numerous to count /HPF  Squamous Epithelial Cells: Present  Red Blood Cell - Urine: 25 /HPF  White Blood Cell - Urine: 15 /HPF  Hyaline Casts: Present    Culture - Blood (12.11.24 @ 10:00)   - Coagulase negative Staphylococcus: Detec  Gram Stain:   Growth in anaerobic bottle: Gram Positive Cocci in Clusters  Specimen Source: .Blood BLOOD  Organism: Blood Culture PCR  Culture Results:   Growth in anaerobic bottle: Gram Positive Cocci in Clusters   Direct identification is available within approximately 3-5   hours either by Blood Panel Multiplexed PCR or Direct   MALDI-TOF. Details: [email or web address]  Organism Identification: Blood Culture PCR  Method Type: PCR    Culture - Blood (12.11.24 @ 09:  50)   Specimen Source: .Blood BLOOD  Culture Results:   No growth at 5 days    FluA/FluB/RSV/COVID PCR (12.11.24 @ 09:50)   SARS-CoV-2 Result: NotDetec: This Respiratory Panel uses polymerase chain reaction (PCR) to detect for   influenza A; influenza B; and SARS-CoV-2.   This test was validated by [facility] and is in use under the FDA   Emergency Use Authorization (EUA) for clinical labs CLIA-certified to   perform high complexity testing. Test results should be correlated with   clinical presentation, patient history, and epidemiology.

## 2024-12-18 NOTE — PROGRESS NOTE ADULT - SUBJECTIVE AND OBJECTIVE BOX
Patient was seen and examined  Patient is a 89y old  Female who presents with a chief complaint of Influenza, COPD  exacerbation (18 Dec 2024 11:08)      INTERVAL HPI/OVERNIGHT EVENTS:  T(C): 36.6 (12-18-24 @ 11:00), Max: 36.8 (12-17-24 @ 15:48)  HR: 80 (12-18-24 @ 11:00) (69 - 85)  BP: 129/78 (12-18-24 @ 11:00) (108/66 - 129/78)  RR: 19 (12-18-24 @ 11:00) (18 - 19)  SpO2: 98% (12-18-24 @ 11:00) (98% - 100%)  Wt(kg): --  I&O's Summary    17 Dec 2024 07:01  -  18 Dec 2024 07:00  --------------------------------------------------------  IN: 275 mL / OUT: 2250 mL / NET: -1975 mL    18 Dec 2024 07:01  -  18 Dec 2024 12:08  --------------------------------------------------------  IN: 177 mL / OUT: 0 mL / NET: 177 mL        LABS:                        14.6   7.45  )-----------( 229      ( 17 Dec 2024 07:35 )             44.2     12-18    138  |  96  |  17  ----------------------------<  170[H]  3.7   |  40[H]  |  0.73    Ca    8.4      18 Dec 2024 08:15        Urinalysis Basic - ( 18 Dec 2024 08:15 )    Color: x / Appearance: x / SG: x / pH: x  Gluc: 170 mg/dL / Ketone: x  / Bili: x / Urobili: x   Blood: x / Protein: x / Nitrite: x   Leuk Esterase: x / RBC: x / WBC x   Sq Epi: x / Non Sq Epi: x / Bacteria: x      CAPILLARY BLOOD GLUCOSE              Urinalysis Basic - ( 18 Dec 2024 08:15 )    Color: x / Appearance: x / SG: x / pH: x  Gluc: 170 mg/dL / Ketone: x  / Bili: x / Urobili: x   Blood: x / Protein: x / Nitrite: x   Leuk Esterase: x / RBC: x / WBC x   Sq Epi: x / Non Sq Epi: x / Bacteria: x        MEDICATIONS  (STANDING):  anastrozole 1 milliGRAM(s) Oral daily  apixaban 5 milliGRAM(s) Oral every 12 hours  atorvastatin 10 milliGRAM(s) Oral at bedtime  carvedilol 12.5 milliGRAM(s) Oral every 12 hours  ertapenem  IVPB 1000 milliGRAM(s) IV Intermittent every 24 hours  famotidine    Tablet 20 milliGRAM(s) Oral two times a day  fluticasone propionate/ salmeterol 100-50 MICROgram(s) Diskus 1 Dose(s) Inhalation two times a day  furosemide    Tablet 40 milliGRAM(s) Oral daily  hydrALAZINE 50 milliGRAM(s) Oral every 8 hours  lidocaine   4% Patch 1 Patch Transdermal every 24 hours  montelukast 10 milliGRAM(s) Oral daily  predniSONE   Tablet 40 milliGRAM(s) Oral daily  tiotropium 2.5 MICROgram(s) Inhaler 2 Puff(s) Inhalation daily    MEDICATIONS  (PRN):  acetaminophen     Tablet .. 650 milliGRAM(s) Oral every 6 hours PRN Temp greater or equal to 38C (100.4F), Mild Pain (1 - 3)  albuterol    90 MICROgram(s) HFA Inhaler 2 Puff(s) Inhalation every 6 hours PRN for bronchospasm  ondansetron Injectable 4 milliGRAM(s) IV Push every 8 hours PRN Nausea and/or Vomiting  traMADol 25 milliGRAM(s) Oral every 8 hours PRN Severe Pain (7 - 10)      RADIOLOGY & ADDITIONAL TESTS:    Imaging Personally Reviewed:  [ ] YES  [ ] NO    REVIEW OF SYSTEMS:  CONSTITUTIONAL: No fever, weight loss, or fatigue  EYES: No eye pain, visual disturbances, or discharge  ENMT:  No difficulty hearing, tinnitus, vertigo; No sinus or throat pain  NECK: No pain or stiffness  BREASTS: No pain, masses, or nipple discharge  RESPIRATORY: No cough, wheezing, chills or hemoptysis; No shortness of breath  CARDIOVASCULAR: No chest pain, palpitations, dizziness, or leg swelling  GASTROINTESTINAL: No abdominal or epigastric pain. No nausea, vomiting, or hematemesis; No diarrhea or constipation. No melena or hematochezia.  GENITOURINARY: No dysuria, frequency, hematuria, or incontinence  NEUROLOGICAL: No headaches, memory loss, loss of strength, numbness, or tremors  SKIN: No itching, burning, rashes, or lesions   LYMPH NODES: No enlarged glands  ENDOCRINE: No heat or cold intolerance; No hair loss  MUSCULOSKELETAL: No joint pain or swelling; No muscle, back, or extremity pain  PSYCHIATRIC: No depression, anxiety, mood swings, or difficulty sleeping  HEME/LYMPH: No easy bruising, or bleeding gums  ALLERY AND IMMUNOLOGIC: No hives or eczema      Consultant(s) Notes Reviewed:  [ x ] YES  [ ] NO    PHYSICAL EXAM:  GENERAL: NAD, well-groomed, well-developed  HEAD:  Atraumatic, Normocephalic  EYES: EOMI, PERRLA, conjunctiva and sclera clear  ENMT: No tonsillar erythema, exudates, or enlargement; Moist mucous membranes, Good dentition, No lesions  NECK: Supple, No JVD, Normal thyroid  NERVOUS SYSTEM:  Alert & Oriented X3, Good concentration; Motor Strength 5/5 B/L upper and lower extremities; DTRs 2+ intact and symmetric  CHEST/LUNG: Clear to percussion bilaterally; No rales, rhonchi, wheezing, or rubs  HEART: Regular rate and rhythm; No murmurs, rubs, or gallops  ABDOMEN: Soft, Nontender, Nondistended; Bowel sounds present  EXTREMITIES:  2+ Peripheral Pulses, No clubbing, cyanosis, or edema  LYMPH: No lymphadenopathy noted  SKIN: No rashes or lesions    Care Discussed with Consultants/Other Providers [ x] YES  [ ] NO

## 2024-12-18 NOTE — PROGRESS NOTE ADULT - ASSESSMENT
Patient is a 89y old  Female  with morbid obesity,  ambulates with walker, with PMH of HTN, HLD, COPD (never smoker, on 2L O2 at home), BIPAP at night , HFpEF (EF 55-60%), severe RV, Afib on Eliquis, Pulmonary HTN, now presents to the ER for evaluation of worsening dry cough, SOB at rest and on exertion, fever, chills, and body aches. Patient endorses sick contact with daughter who might currently have the flu. Patient did not receive the flu vaccine this season. On admission, she found to have Fever, positive Influenza A and positive Urine analysis. She has started on Tamiflu and Azithromycin, and the ID consult requested to assist with further evaluation and antibiotic management.    # Influenza A - s/p completed the course of Tamiflu  # UTI - 12/11/24- urine cx grew >100,000 CFU/ml Klebsiella pneumoniae ESBL  # Bacteremia -Culture  Blood (12.11.24 @ 10:00) - Coagulase negative Staphylococcus- most likely a contaminant - Repeat blood cx from 12/15 have NGTD    would recommend:    1. OOB to chair   2. Continue Ertapenem to cover ESBL  Isolates until 12/18/24   3. Aspiration precaution    d/w covering NPMarky    Attending Attestation:    Spent more than 35 minutes on total encounter, more than 50 % of the visit was spent counseling and/or coordinating care by the Attending physician.

## 2024-12-18 NOTE — PROGRESS NOTE ADULT - SUBJECTIVE AND OBJECTIVE BOX
Date of Service 12-18-24 @ 10:11    CHIEF COMPLAINT:Patient is a 89y old  Female who presents with a chief complaint of Influenza, COPD  exacerbation.Pt appears comfortable.    	  REVIEW OF SYSTEMS:  CONSTITUTIONAL: No fever, weight loss, or fatigue  EYES: No eye pain, visual disturbances, or discharge  ENT:  No difficulty hearing, tinnitus, vertigo; No sinus or throat pain  NECK: No pain or stiffness  RESPIRATORY: No cough, wheezing, chills or hemoptysis; No Shortness of Breath  CARDIOVASCULAR: No chest pain, palpitations, passing out, dizziness, or leg swelling  GASTROINTESTINAL: No abdominal or epigastric pain. No nausea, vomiting, or hematemesis; No diarrhea or constipation. No melena or hematochezia.  GENITOURINARY: No dysuria, frequency, hematuria, or incontinence  NEUROLOGICAL: No headaches, memory loss, loss of strength, numbness, or tremors  SKIN: No itching, burning, rashes, or lesions   LYMPH Nodes: No enlarged glands  ENDOCRINE: No heat or cold intolerance; No hair loss  MUSCULOSKELETAL: No joint pain or swelling; No muscle, back, or extremity pain  PSYCHIATRIC: No depression, anxiety, mood swings, or difficulty sleeping  HEME/LYMPH: No easy bruising, or bleeding gums  ALLERGY AND IMMUNOLOGIC: No hives or eczema	        PHYSICAL EXAM:  T(C): 36.6 (12-18-24 @ 04:55), Max: 36.8 (12-17-24 @ 15:48)  HR: 69 (12-18-24 @ 04:55) (69 - 85)  BP: 128/77 (12-18-24 @ 04:55) (108/66 - 128/77)  RR: 18 (12-18-24 @ 04:55) (18 - 18)  SpO2: 100% (12-18-24 @ 04:55) (99% - 100%)  Wt(kg): --  I&O's Summary    17 Dec 2024 07:01  -  18 Dec 2024 07:00  --------------------------------------------------------  IN: 275 mL / OUT: 2250 mL / NET: -1975 mL    18 Dec 2024 07:01  -  18 Dec 2024 10:11  --------------------------------------------------------  IN: 177 mL / OUT: 0 mL / NET: 177 mL        Appearance: Normal	  HEENT:   Normal oral mucosa, PERRL, EOMI	  Lymphatic: No lymphadenopathy  Cardiovascular: Normal S1 S2, No JVD, No murmurs, No edema  Respiratory: Lungs clear to auscultation	  Psychiatry: A & O x 3, Mood & affect appropriate  Gastrointestinal:  Soft, Non-tender, + BS	  Skin: No rashes, No ecchymoses, No cyanosis	  Neurologic: Non-focal  Extremities: Normal range of motion, No clubbing, cyanosis or edema  Vascular: Peripheral pulses palpable 2+ bilaterally    MEDICATIONS  (STANDING):  anastrozole 1 milliGRAM(s) Oral daily  apixaban 5 milliGRAM(s) Oral every 12 hours  atorvastatin 10 milliGRAM(s) Oral at bedtime  carvedilol 12.5 milliGRAM(s) Oral every 12 hours  ertapenem  IVPB 1000 milliGRAM(s) IV Intermittent every 24 hours  famotidine    Tablet 20 milliGRAM(s) Oral two times a day  fluticasone propionate/ salmeterol 100-50 MICROgram(s) Diskus 1 Dose(s) Inhalation two times a day  furosemide    Tablet 40 milliGRAM(s) Oral daily  hydrALAZINE 50 milliGRAM(s) Oral every 8 hours  lidocaine   4% Patch 1 Patch Transdermal every 24 hours  montelukast 10 milliGRAM(s) Oral daily  predniSONE   Tablet 40 milliGRAM(s) Oral daily  tiotropium 2.5 MICROgram(s) Inhaler 2 Puff(s) Inhalation daily      	  LABS:	 	                         14.6   7.45  )-----------( 229      ( 17 Dec 2024 07:35 )             44.2     12-18    138  |  96  |  17  ----------------------------<  170[H]  3.7   |  40[H]  |  0.73    Ca    8.4      18 Dec 2024 08:15

## 2024-12-19 RX ORDER — PREDNISONE 20 MG/1
3 TABLET ORAL
Qty: 9 | Refills: 0
Start: 2024-12-19 | End: 2024-12-21

## 2024-12-20 LAB
CULTURE RESULTS: SIGNIFICANT CHANGE UP
SPECIMEN SOURCE: SIGNIFICANT CHANGE UP

## 2024-12-22 RX ORDER — PREDNISONE 20 MG/1
1 TABLET ORAL
Qty: 3 | Refills: 0
Start: 2024-12-22 | End: 2024-12-24

## 2024-12-25 RX ORDER — PREDNISONE 20 MG/1
1 TABLET ORAL
Qty: 3 | Refills: 0
Start: 2024-12-25 | End: 2024-12-27

## 2025-03-05 NOTE — SWALLOW BEDSIDE ASSESSMENT ADULT - ASPIRATION PRECAUTIONS
Patient discharged to home .Pt alert and talkative, vitals stable on room air, tolerating PO intake. Discharge instructions (written and verbal) and follow-up information given to patient who verbalized understanding, as well as a readiness for discharge. [facility] contact info provided for additional questions following discharge. IV removed with catheter still intact. AOx4  
Procedure information  given pt verbally understands. Armbands applied and verified.   
yes

## 2025-03-21 ENCOUNTER — INPATIENT (INPATIENT)
Facility: HOSPITAL | Age: 89
LOS: 3 days | Discharge: HOME | DRG: 637 | End: 2025-03-25
Attending: INTERNAL MEDICINE | Admitting: INTERNAL MEDICINE
Payer: MEDICARE

## 2025-03-21 VITALS
RESPIRATION RATE: 16 BRPM | HEART RATE: 79 BPM | TEMPERATURE: 98 F | SYSTOLIC BLOOD PRESSURE: 134 MMHG | OXYGEN SATURATION: 98 % | WEIGHT: 259.93 LBS | DIASTOLIC BLOOD PRESSURE: 78 MMHG

## 2025-03-21 DIAGNOSIS — I50.32 CHRONIC DIASTOLIC (CONGESTIVE) HEART FAILURE: ICD-10-CM

## 2025-03-21 DIAGNOSIS — I10 ESSENTIAL (PRIMARY) HYPERTENSION: ICD-10-CM

## 2025-03-21 DIAGNOSIS — E78.5 HYPERLIPIDEMIA, UNSPECIFIED: ICD-10-CM

## 2025-03-21 DIAGNOSIS — J44.9 CHRONIC OBSTRUCTIVE PULMONARY DISEASE, UNSPECIFIED: ICD-10-CM

## 2025-03-21 DIAGNOSIS — Z29.9 ENCOUNTER FOR PROPHYLACTIC MEASURES, UNSPECIFIED: ICD-10-CM

## 2025-03-21 DIAGNOSIS — R73.9 HYPERGLYCEMIA, UNSPECIFIED: ICD-10-CM

## 2025-03-21 DIAGNOSIS — N39.0 URINARY TRACT INFECTION, SITE NOT SPECIFIED: ICD-10-CM

## 2025-03-21 DIAGNOSIS — I48.91 UNSPECIFIED ATRIAL FIBRILLATION: ICD-10-CM

## 2025-03-21 LAB
ACETONE SERPL-MCNC: NEGATIVE — SIGNIFICANT CHANGE UP
ALBUMIN SERPL ELPH-MCNC: 2.8 G/DL — LOW (ref 3.5–5)
ALP SERPL-CCNC: 153 U/L — HIGH (ref 40–120)
ALT FLD-CCNC: 32 U/L DA — SIGNIFICANT CHANGE UP (ref 10–60)
ANION GAP SERPL CALC-SCNC: 7 MMOL/L — SIGNIFICANT CHANGE UP (ref 5–17)
ANION GAP SERPL CALC-SCNC: 7 MMOL/L — SIGNIFICANT CHANGE UP (ref 5–17)
APPEARANCE UR: ABNORMAL
AST SERPL-CCNC: 34 U/L — SIGNIFICANT CHANGE UP (ref 10–40)
BACTERIA # UR AUTO: ABNORMAL /HPF
BASE EXCESS BLDV CALC-SCNC: 7.2 MMOL/L — SIGNIFICANT CHANGE UP
BASOPHILS # BLD AUTO: 0.01 K/UL — SIGNIFICANT CHANGE UP (ref 0–0.2)
BASOPHILS NFR BLD AUTO: 0.1 % — SIGNIFICANT CHANGE UP (ref 0–2)
BILIRUB SERPL-MCNC: 0.8 MG/DL — SIGNIFICANT CHANGE UP (ref 0.2–1.2)
BILIRUB UR-MCNC: NEGATIVE — SIGNIFICANT CHANGE UP
BUN SERPL-MCNC: 27 MG/DL — HIGH (ref 7–18)
BUN SERPL-MCNC: 30 MG/DL — HIGH (ref 7–18)
CALCIUM SERPL-MCNC: 7.6 MG/DL — LOW (ref 8.4–10.5)
CALCIUM SERPL-MCNC: 8.2 MG/DL — LOW (ref 8.4–10.5)
CHLORIDE SERPL-SCNC: 93 MMOL/L — LOW (ref 96–108)
CHLORIDE SERPL-SCNC: 97 MMOL/L — SIGNIFICANT CHANGE UP (ref 96–108)
CO2 SERPL-SCNC: 29 MMOL/L — SIGNIFICANT CHANGE UP (ref 22–31)
CO2 SERPL-SCNC: 29 MMOL/L — SIGNIFICANT CHANGE UP (ref 22–31)
COLOR SPEC: YELLOW — SIGNIFICANT CHANGE UP
COMMENT - URINE: SIGNIFICANT CHANGE UP
CREAT SERPL-MCNC: 0.98 MG/DL — SIGNIFICANT CHANGE UP (ref 0.5–1.3)
CREAT SERPL-MCNC: 1.25 MG/DL — SIGNIFICANT CHANGE UP (ref 0.5–1.3)
DIFF PNL FLD: ABNORMAL
EGFR: 40 ML/MIN/1.73M2 — LOW
EGFR: 40 ML/MIN/1.73M2 — LOW
EGFR: 54 ML/MIN/1.73M2 — LOW
EGFR: 54 ML/MIN/1.73M2 — LOW
EOSINOPHIL # BLD AUTO: 0.01 K/UL — SIGNIFICANT CHANGE UP (ref 0–0.5)
EOSINOPHIL NFR BLD AUTO: 0.1 % — SIGNIFICANT CHANGE UP (ref 0–6)
EPI CELLS # UR: PRESENT
GLUCOSE BLDC GLUCOMTR-MCNC: 369 MG/DL — HIGH (ref 70–99)
GLUCOSE BLDC GLUCOMTR-MCNC: 370 MG/DL — HIGH (ref 70–99)
GLUCOSE SERPL-MCNC: 396 MG/DL — HIGH (ref 70–99)
GLUCOSE SERPL-MCNC: 548 MG/DL — CRITICAL HIGH (ref 70–99)
GLUCOSE UR QL: >=1000 MG/DL
HCO3 BLDV-SCNC: 31 MMOL/L — HIGH (ref 22–29)
HCT VFR BLD CALC: 44 % — SIGNIFICANT CHANGE UP (ref 34.5–45)
HGB BLD-MCNC: 15 G/DL — SIGNIFICANT CHANGE UP (ref 11.5–15.5)
IMM GRANULOCYTES NFR BLD AUTO: 1.1 % — HIGH (ref 0–0.9)
KETONES UR-MCNC: NEGATIVE MG/DL — SIGNIFICANT CHANGE UP
LEUKOCYTE ESTERASE UR-ACNC: ABNORMAL
LYMPHOCYTES # BLD AUTO: 0.78 K/UL — LOW (ref 1–3.3)
LYMPHOCYTES # BLD AUTO: 7.6 % — LOW (ref 13–44)
MCHC RBC-ENTMCNC: 31.6 PG — SIGNIFICANT CHANGE UP (ref 27–34)
MCHC RBC-ENTMCNC: 34.1 G/DL — SIGNIFICANT CHANGE UP (ref 32–36)
MCV RBC AUTO: 92.6 FL — SIGNIFICANT CHANGE UP (ref 80–100)
MONOCYTES # BLD AUTO: 0.35 K/UL — SIGNIFICANT CHANGE UP (ref 0–0.9)
MONOCYTES NFR BLD AUTO: 3.4 % — SIGNIFICANT CHANGE UP (ref 2–14)
NEUTROPHILS # BLD AUTO: 8.94 K/UL — HIGH (ref 1.8–7.4)
NEUTROPHILS NFR BLD AUTO: 87.7 % — HIGH (ref 43–77)
NITRITE UR-MCNC: NEGATIVE — SIGNIFICANT CHANGE UP
NRBC BLD AUTO-RTO: 0 /100 WBCS — SIGNIFICANT CHANGE UP (ref 0–0)
NT-PROBNP SERPL-SCNC: 463 PG/ML — HIGH (ref 0–450)
PCO2 BLDV: 41 MMHG — SIGNIFICANT CHANGE UP (ref 39–42)
PH BLDV: 7.49 — HIGH (ref 7.32–7.43)
PH UR: 5.5 — SIGNIFICANT CHANGE UP (ref 5–8)
PLATELET # BLD AUTO: 139 K/UL — LOW (ref 150–400)
PO2 BLDV: 39 MMHG — SIGNIFICANT CHANGE UP
POTASSIUM SERPL-MCNC: 3.4 MMOL/L — LOW (ref 3.5–5.3)
POTASSIUM SERPL-MCNC: 5.4 MMOL/L — HIGH (ref 3.5–5.3)
POTASSIUM SERPL-SCNC: 3.4 MMOL/L — LOW (ref 3.5–5.3)
POTASSIUM SERPL-SCNC: 5.4 MMOL/L — HIGH (ref 3.5–5.3)
PROT SERPL-MCNC: 6.5 G/DL — SIGNIFICANT CHANGE UP (ref 6–8.3)
PROT UR-MCNC: NEGATIVE MG/DL — SIGNIFICANT CHANGE UP
RBC # BLD: 4.75 M/UL — SIGNIFICANT CHANGE UP (ref 3.8–5.2)
RBC # FLD: 12.9 % — SIGNIFICANT CHANGE UP (ref 10.3–14.5)
RBC CASTS # UR COMP ASSIST: 4 /HPF — SIGNIFICANT CHANGE UP (ref 0–4)
SAO2 % BLDV: 69.6 % — SIGNIFICANT CHANGE UP
SODIUM SERPL-SCNC: 129 MMOL/L — LOW (ref 135–145)
SODIUM SERPL-SCNC: 133 MMOL/L — LOW (ref 135–145)
SP GR SPEC: 1.01 — SIGNIFICANT CHANGE UP (ref 1–1.03)
TROPONIN I, HIGH SENSITIVITY RESULT: 72.3 NG/L — HIGH
TROPONIN I, HIGH SENSITIVITY RESULT: 77 NG/L — HIGH
UROBILINOGEN FLD QL: 0.2 MG/DL — SIGNIFICANT CHANGE UP (ref 0.2–1)
WBC # BLD: 10.2 K/UL — SIGNIFICANT CHANGE UP (ref 3.8–10.5)
WBC # FLD AUTO: 10.2 K/UL — SIGNIFICANT CHANGE UP (ref 3.8–10.5)
WBC UR QL: >100 /HPF — HIGH (ref 0–5)

## 2025-03-21 PROCEDURE — 99285 EMERGENCY DEPT VISIT HI MDM: CPT

## 2025-03-21 PROCEDURE — 71045 X-RAY EXAM CHEST 1 VIEW: CPT | Mod: 26

## 2025-03-21 RX ORDER — ANASTROZOLE 1 MG/1
1 TABLET ORAL DAILY
Refills: 0 | Status: DISCONTINUED | OUTPATIENT
Start: 2025-03-21 | End: 2025-03-25

## 2025-03-21 RX ORDER — INSULIN LISPRO 100 U/ML
INJECTION, SOLUTION INTRAVENOUS; SUBCUTANEOUS
Refills: 0 | Status: DISCONTINUED | OUTPATIENT
Start: 2025-03-21 | End: 2025-03-25

## 2025-03-21 RX ORDER — ACETAMINOPHEN 500 MG/5ML
650 LIQUID (ML) ORAL EVERY 6 HOURS
Refills: 0 | Status: DISCONTINUED | OUTPATIENT
Start: 2025-03-21 | End: 2025-03-25

## 2025-03-21 RX ORDER — TIOTROPIUM BROMIDE INHALATION SPRAY 3.12 UG/1
2 SPRAY, METERED RESPIRATORY (INHALATION) DAILY
Refills: 0 | Status: DISCONTINUED | OUTPATIENT
Start: 2025-03-21 | End: 2025-03-25

## 2025-03-21 RX ORDER — CARVEDILOL 3.12 MG/1
6.25 TABLET, FILM COATED ORAL EVERY 12 HOURS
Refills: 0 | Status: DISCONTINUED | OUTPATIENT
Start: 2025-03-21 | End: 2025-03-25

## 2025-03-21 RX ORDER — ENOXAPARIN SODIUM 100 MG/ML
40 INJECTION SUBCUTANEOUS EVERY 24 HOURS
Refills: 0 | Status: DISCONTINUED | OUTPATIENT
Start: 2025-03-21 | End: 2025-03-21

## 2025-03-21 RX ORDER — FUROSEMIDE 10 MG/ML
40 INJECTION INTRAMUSCULAR; INTRAVENOUS DAILY
Refills: 0 | Status: DISCONTINUED | OUTPATIENT
Start: 2025-03-21 | End: 2025-03-25

## 2025-03-21 RX ORDER — MELATONIN 5 MG
3 TABLET ORAL AT BEDTIME
Refills: 0 | Status: DISCONTINUED | OUTPATIENT
Start: 2025-03-21 | End: 2025-03-25

## 2025-03-21 RX ORDER — APIXABAN 2.5 MG/1
5 TABLET, FILM COATED ORAL EVERY 12 HOURS
Refills: 0 | Status: DISCONTINUED | OUTPATIENT
Start: 2025-03-21 | End: 2025-03-25

## 2025-03-21 RX ORDER — INSULIN LISPRO 100 U/ML
INJECTION, SOLUTION INTRAVENOUS; SUBCUTANEOUS AT BEDTIME
Refills: 0 | Status: DISCONTINUED | OUTPATIENT
Start: 2025-03-21 | End: 2025-03-25

## 2025-03-21 RX ORDER — ERTAPENEM SODIUM 1 G/1
1000 INJECTION, POWDER, LYOPHILIZED, FOR SOLUTION INTRAMUSCULAR; INTRAVENOUS EVERY 24 HOURS
Refills: 0 | Status: COMPLETED | OUTPATIENT
Start: 2025-03-22 | End: 2025-03-24

## 2025-03-21 RX ORDER — ALBUTEROL SULFATE 2.5 MG/3ML
2 VIAL, NEBULIZER (ML) INHALATION EVERY 6 HOURS
Refills: 0 | Status: DISCONTINUED | OUTPATIENT
Start: 2025-03-21 | End: 2025-03-25

## 2025-03-21 RX ORDER — MONTELUKAST SODIUM 10 MG/1
10 TABLET ORAL DAILY
Refills: 0 | Status: DISCONTINUED | OUTPATIENT
Start: 2025-03-21 | End: 2025-03-25

## 2025-03-21 RX ORDER — ONDANSETRON HCL/PF 4 MG/2 ML
4 VIAL (ML) INJECTION EVERY 8 HOURS
Refills: 0 | Status: DISCONTINUED | OUTPATIENT
Start: 2025-03-21 | End: 2025-03-25

## 2025-03-21 RX ORDER — SODIUM CHLORIDE 9 G/1000ML
1000 INJECTION, SOLUTION INTRAVENOUS
Refills: 0 | Status: DISCONTINUED | OUTPATIENT
Start: 2025-03-21 | End: 2025-03-24

## 2025-03-21 RX ORDER — ERTAPENEM SODIUM 1 G/1
1000 INJECTION, POWDER, LYOPHILIZED, FOR SOLUTION INTRAMUSCULAR; INTRAVENOUS ONCE
Refills: 0 | Status: COMPLETED | OUTPATIENT
Start: 2025-03-21 | End: 2025-03-21

## 2025-03-21 RX ORDER — INSULIN GLARGINE-YFGN 100 [IU]/ML
10 INJECTION, SOLUTION SUBCUTANEOUS ONCE
Refills: 0 | Status: COMPLETED | OUTPATIENT
Start: 2025-03-21 | End: 2025-03-21

## 2025-03-21 RX ORDER — ATORVASTATIN CALCIUM 80 MG/1
10 TABLET, FILM COATED ORAL AT BEDTIME
Refills: 0 | Status: DISCONTINUED | OUTPATIENT
Start: 2025-03-21 | End: 2025-03-25

## 2025-03-21 RX ORDER — MAGNESIUM, ALUMINUM HYDROXIDE 200-200 MG
30 TABLET,CHEWABLE ORAL EVERY 4 HOURS
Refills: 0 | Status: DISCONTINUED | OUTPATIENT
Start: 2025-03-21 | End: 2025-03-25

## 2025-03-21 RX ADMIN — INSULIN LISPRO 3: 100 INJECTION, SOLUTION INTRAVENOUS; SUBCUTANEOUS at 22:39

## 2025-03-21 RX ADMIN — Medication 8 UNIT(S): at 17:08

## 2025-03-21 RX ADMIN — ERTAPENEM SODIUM 120 MILLIGRAM(S): 1 INJECTION, POWDER, LYOPHILIZED, FOR SOLUTION INTRAMUSCULAR; INTRAVENOUS at 17:08

## 2025-03-21 RX ADMIN — Medication 1000 MILLILITER(S): at 16:17

## 2025-03-21 RX ADMIN — INSULIN GLARGINE-YFGN 10 UNIT(S): 100 INJECTION, SOLUTION SUBCUTANEOUS at 22:39

## 2025-03-21 NOTE — H&P ADULT - HISTORY OF PRESENT ILLNESS
89 yo F, w/ morbid obesity,  ambulates with walker, with PMH of HTN, HLD, COPD (never smoker, on 2L O2 at home), BIPAP at night , HFpEF (EF 55-60%), severe RV, Afib on Eliquis, Pulmonary HTN, presents from her doctors office with hyperglycemia. 89 yo F, w/ morbid obesity, is legally blind ambulates with walker, with PMH of HTN, HLD, COPD (never smoker, on 2L O2 at home), BIPAP at night , HFpEF (EF 55-60%), severe RV, Afib on Eliquis, Pulmonary HTN, frequent UTI's presents from her doctors office with hyperglycemia. Collateral obtained from granddaughter elder duong [phone]. Patient has a history of pre DM and her a1c on her usual visits has always been in the 5's and she didnt require any medication. She was not complaining of anything specifically but at the doctors appointment, the high blood sugar and general appearance prompted them to send the patient to the ED. Patient denies any fever, chills, dizziness ,headache, SOB, cough, chest pain, palpitations, nausea, vomiting, diarrhea, abdominal pain, urinary symptoms, lower extremity pain, or edema. Patient denies recent travel or sick contacts.

## 2025-03-21 NOTE — ED ADULT NURSE NOTE - NSFALLHARMRISKINTERV_ED_ALL_ED

## 2025-03-21 NOTE — H&P ADULT - ASSESSMENT
89 yo F, w/ morbid obesity, is legally blind ambulates with walker, with PMH of HTN, HLD, COPD (never smoker, on 2L O2 at home), BIPAP at night , HFpEF (EF 55-60%), severe RV, Afib on Eliquis, Pulmonary HTN, frequent UTI's presents from her doctors office with hyperglycemia. Patient admitted for uncontrolled hyperglycemia and UTI.

## 2025-03-21 NOTE — ED PROVIDER NOTE - CARE PLAN
1 Principal Discharge DX:	Hyperglycemia   Principal Discharge DX:	Hyperglycemia  Secondary Diagnosis:	Acute UTI  Secondary Diagnosis:	Non-ST elevation MI (NSTEMI)

## 2025-03-21 NOTE — ED PROVIDER NOTE - CLINICAL SUMMARY MEDICAL DECISION MAKING FREE TEXT BOX
patient with malaise hyperglycemia here from PCPs office.  Will do labs likely admit. labs reveal critical hyperglycemia

## 2025-03-21 NOTE — H&P ADULT - PROBLEM SELECTOR PROBLEM 5

## 2025-03-21 NOTE — H&P ADULT - NSHPPHYSICALEXAM_GEN_ALL_CORE
PHYSICAL EXAMINATION:  GENERAL: NAD  HEAD:  Atraumatic, Normocephalic  EYES:  conjunctiva and sclera clear  NECK: Supple, No JVD, Normal thyroid  CHEST/LUNG: Clear to auscultation. No rales, rhonchi, wheezing, or rubs  HEART: Regular rate and rhythm; No murmurs, rubs, or gallops  ABDOMEN: Soft, Nontender, Nondistended; Bowel sounds present  NERVOUS SYSTEM:  Alert & Oriented X3,  EXTREMITIES:  2+ Peripheral Pulses, No clubbing, cyanosis, or edema  SKIN: warm dry    T(C): 36.9 (03-21-25 @ 13:36), Max: 36.9 (03-21-25 @ 13:36)  HR: 79 (03-21-25 @ 13:36) (79 - 79)  BP: 134/78 (03-21-25 @ 13:36) (134/78 - 134/78)  RR: 16 (03-21-25 @ 13:36) (16 - 16)  SpO2: 98% (03-21-25 @ 13:36) (98% - 98%)

## 2025-03-21 NOTE — H&P ADULT - PROBLEM SELECTOR PLAN 1
BG>500 in ED  hx of predm  s/p 8 u regular insulin and IVF  started ISS  NPO until BG<200 then start diet but no chicken,turkey or eggs (no poultry)  endo consult

## 2025-03-21 NOTE — H&P ADULT - SOCIAL HISTORY
care time excluding separately billable procedures.      I am the primary physician of Record.     FINAL IMPRESSION    1. Other migraine without status migrainosus, not intractable    2. Elevated liver transaminase level         DISPOSITION/PLAN   DISPOSITION Decision To Discharge 10/02/2024 02:47:36 PM  Condition at Disposition: Data Unavailable       PATIENT REFERRED TO:   Yunior Tyson MD  [address]  [facility] [address]  [phone]    Schedule an appointment as soon as possible for a visit in 2 days      [facility]  ED  [address]  [facility] [address] [phone]  Go to   immediately if symptoms worsen    Flip Sherman, DO  [address]  [facility] [address]  [phone]    Schedule an appointment as soon as possible for a visit        DISCHARGE MEDICATIONS:   Discharge Medication List as of 10/2/2024  2:51 PM         DISCONTINUED MEDICATIONS:   Discharge Medication List as of 10/2/2024  2:51 PM               (Please note that portions of this note were completed with a voice recognition program.  Efforts were made to edit the dictations but occasionally words are mis-transcribed.)     Arturo Olmedo MD (electronically signed)                 Arturo Olmedo MD  10/02/24 1956    
No

## 2025-03-21 NOTE — ED PROVIDER NOTE - OBJECTIVE STATEMENT
89-year-old female presents with elevated glucose and malaise for 5 days.  There is been no vomiting no diarrhea no cough.  Patient history of hypertension hyperlipidemia A-fib. patient is from home.  Patient was admitted to [facility] for UTI and pneumonia last month.  Today she went to see Dr. Sosa and was told to go to the ER. patient has no known history of diabetes

## 2025-03-21 NOTE — H&P ADULT - PROBLEM SELECTOR PLAN 2
history of UTI in past  not complaining of specific symptoms  UA +ve and hyperglycemic, general malaise- will treat with ertapenem  ID consulted Amin

## 2025-03-22 DIAGNOSIS — G93.41 METABOLIC ENCEPHALOPATHY: ICD-10-CM

## 2025-03-22 LAB
A1C WITH ESTIMATED AVERAGE GLUCOSE RESULT: 10.6 % — HIGH (ref 4–5.6)
ALBUMIN SERPL ELPH-MCNC: 2.8 G/DL — LOW (ref 3.5–5)
ALP SERPL-CCNC: 112 U/L — SIGNIFICANT CHANGE UP (ref 40–120)
ALT FLD-CCNC: 28 U/L DA — SIGNIFICANT CHANGE UP (ref 10–60)
ANION GAP SERPL CALC-SCNC: 4 MMOL/L — LOW (ref 5–17)
AST SERPL-CCNC: 12 U/L — SIGNIFICANT CHANGE UP (ref 10–40)
BASOPHILS # BLD AUTO: 0 K/UL — SIGNIFICANT CHANGE UP (ref 0–0.2)
BASOPHILS NFR BLD AUTO: 0 % — SIGNIFICANT CHANGE UP (ref 0–2)
BILIRUB SERPL-MCNC: 0.8 MG/DL — SIGNIFICANT CHANGE UP (ref 0.2–1.2)
BUN SERPL-MCNC: 21 MG/DL — HIGH (ref 7–18)
CALCIUM SERPL-MCNC: 8.5 MG/DL — SIGNIFICANT CHANGE UP (ref 8.4–10.5)
CHLORIDE SERPL-SCNC: 94 MMOL/L — LOW (ref 96–108)
CO2 SERPL-SCNC: 35 MMOL/L — HIGH (ref 22–31)
CREAT SERPL-MCNC: 0.85 MG/DL — SIGNIFICANT CHANGE UP (ref 0.5–1.3)
EGFR: 64 ML/MIN/1.73M2 — SIGNIFICANT CHANGE UP
EGFR: 64 ML/MIN/1.73M2 — SIGNIFICANT CHANGE UP
EOSINOPHIL # BLD AUTO: 0.01 K/UL — SIGNIFICANT CHANGE UP (ref 0–0.5)
EOSINOPHIL NFR BLD AUTO: 0.1 % — SIGNIFICANT CHANGE UP (ref 0–6)
ESTIMATED AVERAGE GLUCOSE: 258 MG/DL — HIGH (ref 68–114)
GLUCOSE BLDC GLUCOMTR-MCNC: 172 MG/DL — HIGH (ref 70–99)
GLUCOSE BLDC GLUCOMTR-MCNC: 218 MG/DL — HIGH (ref 70–99)
GLUCOSE BLDC GLUCOMTR-MCNC: 304 MG/DL — HIGH (ref 70–99)
GLUCOSE BLDC GLUCOMTR-MCNC: 318 MG/DL — HIGH (ref 70–99)
GLUCOSE BLDC GLUCOMTR-MCNC: 326 MG/DL — HIGH (ref 70–99)
GLUCOSE BLDC GLUCOMTR-MCNC: 337 MG/DL — HIGH (ref 70–99)
GLUCOSE SERPL-MCNC: 348 MG/DL — HIGH (ref 70–99)
HCT VFR BLD CALC: 41.5 % — SIGNIFICANT CHANGE UP (ref 34.5–45)
HGB BLD-MCNC: 14.6 G/DL — SIGNIFICANT CHANGE UP (ref 11.5–15.5)
IMM GRANULOCYTES NFR BLD AUTO: 1.3 % — HIGH (ref 0–0.9)
LYMPHOCYTES # BLD AUTO: 1.28 K/UL — SIGNIFICANT CHANGE UP (ref 1–3.3)
LYMPHOCYTES # BLD AUTO: 14.1 % — SIGNIFICANT CHANGE UP (ref 13–44)
MAGNESIUM SERPL-MCNC: 2 MG/DL — SIGNIFICANT CHANGE UP (ref 1.6–2.6)
MCHC RBC-ENTMCNC: 32.4 PG — SIGNIFICANT CHANGE UP (ref 27–34)
MCHC RBC-ENTMCNC: 35.2 G/DL — SIGNIFICANT CHANGE UP (ref 32–36)
MCV RBC AUTO: 92.2 FL — SIGNIFICANT CHANGE UP (ref 80–100)
MONOCYTES # BLD AUTO: 0.29 K/UL — SIGNIFICANT CHANGE UP (ref 0–0.9)
MONOCYTES NFR BLD AUTO: 3.2 % — SIGNIFICANT CHANGE UP (ref 2–14)
NEUTROPHILS # BLD AUTO: 7.37 K/UL — SIGNIFICANT CHANGE UP (ref 1.8–7.4)
NEUTROPHILS NFR BLD AUTO: 81.3 % — HIGH (ref 43–77)
NRBC BLD AUTO-RTO: 0 /100 WBCS — SIGNIFICANT CHANGE UP (ref 0–0)
OSMOLALITY SERPL: 300 MOSMOL/KG — SIGNIFICANT CHANGE UP (ref 280–301)
PHOSPHATE SERPL-MCNC: 2.5 MG/DL — SIGNIFICANT CHANGE UP (ref 2.5–4.5)
PLATELET # BLD AUTO: 144 K/UL — LOW (ref 150–400)
POTASSIUM SERPL-MCNC: 3.9 MMOL/L — SIGNIFICANT CHANGE UP (ref 3.5–5.3)
POTASSIUM SERPL-SCNC: 3.9 MMOL/L — SIGNIFICANT CHANGE UP (ref 3.5–5.3)
PROT SERPL-MCNC: 5.7 G/DL — LOW (ref 6–8.3)
RBC # BLD: 4.5 M/UL — SIGNIFICANT CHANGE UP (ref 3.8–5.2)
RBC # FLD: 12.7 % — SIGNIFICANT CHANGE UP (ref 10.3–14.5)
SODIUM SERPL-SCNC: 133 MMOL/L — LOW (ref 135–145)
WBC # BLD: 9.07 K/UL — SIGNIFICANT CHANGE UP (ref 3.8–10.5)
WBC # FLD AUTO: 9.07 K/UL — SIGNIFICANT CHANGE UP (ref 3.8–10.5)

## 2025-03-22 RX ORDER — METFORMIN HYDROCHLORIDE 850 MG/1
500 TABLET ORAL
Refills: 0 | Status: DISCONTINUED | OUTPATIENT
Start: 2025-03-22 | End: 2025-03-25

## 2025-03-22 RX ORDER — INSULIN GLARGINE-YFGN 100 [IU]/ML
16 INJECTION, SOLUTION SUBCUTANEOUS AT BEDTIME
Refills: 0 | Status: DISCONTINUED | OUTPATIENT
Start: 2025-03-22 | End: 2025-03-23

## 2025-03-22 RX ORDER — INSULIN LISPRO 100 U/ML
4 INJECTION, SOLUTION INTRAVENOUS; SUBCUTANEOUS
Refills: 0 | Status: DISCONTINUED | OUTPATIENT
Start: 2025-03-22 | End: 2025-03-23

## 2025-03-22 RX ADMIN — SODIUM CHLORIDE 70 MILLILITER(S): 9 INJECTION, SOLUTION INTRAVENOUS at 05:38

## 2025-03-22 RX ADMIN — Medication 20 MILLIGRAM(S): at 05:32

## 2025-03-22 RX ADMIN — Medication 1 DOSE(S): at 23:23

## 2025-03-22 RX ADMIN — ATORVASTATIN CALCIUM 10 MILLIGRAM(S): 80 TABLET, FILM COATED ORAL at 22:25

## 2025-03-22 RX ADMIN — INSULIN GLARGINE-YFGN 16 UNIT(S): 100 INJECTION, SOLUTION SUBCUTANEOUS at 22:25

## 2025-03-22 RX ADMIN — INSULIN LISPRO 4: 100 INJECTION, SOLUTION INTRAVENOUS; SUBCUTANEOUS at 08:11

## 2025-03-22 RX ADMIN — APIXABAN 5 MILLIGRAM(S): 2.5 TABLET, FILM COATED ORAL at 05:32

## 2025-03-22 RX ADMIN — Medication 50 MILLIGRAM(S): at 05:32

## 2025-03-22 RX ADMIN — INSULIN LISPRO 4 UNIT(S): 100 INJECTION, SOLUTION INTRAVENOUS; SUBCUTANEOUS at 18:27

## 2025-03-22 RX ADMIN — INSULIN LISPRO 1: 100 INJECTION, SOLUTION INTRAVENOUS; SUBCUTANEOUS at 18:26

## 2025-03-22 RX ADMIN — CARVEDILOL 6.25 MILLIGRAM(S): 3.12 TABLET, FILM COATED ORAL at 05:32

## 2025-03-22 RX ADMIN — ERTAPENEM SODIUM 120 MILLIGRAM(S): 1 INJECTION, POWDER, LYOPHILIZED, FOR SOLUTION INTRAMUSCULAR; INTRAVENOUS at 18:25

## 2025-03-22 RX ADMIN — TIOTROPIUM BROMIDE INHALATION SPRAY 2 PUFF(S): 3.12 SPRAY, METERED RESPIRATORY (INHALATION) at 13:01

## 2025-03-22 RX ADMIN — APIXABAN 5 MILLIGRAM(S): 2.5 TABLET, FILM COATED ORAL at 18:25

## 2025-03-22 RX ADMIN — METFORMIN HYDROCHLORIDE 500 MILLIGRAM(S): 850 TABLET ORAL at 18:25

## 2025-03-22 RX ADMIN — INSULIN LISPRO 4 UNIT(S): 100 INJECTION, SOLUTION INTRAVENOUS; SUBCUTANEOUS at 12:33

## 2025-03-22 RX ADMIN — Medication 1 DOSE(S): at 12:31

## 2025-03-22 RX ADMIN — ANASTROZOLE 1 MILLIGRAM(S): 1 TABLET ORAL at 13:16

## 2025-03-22 RX ADMIN — FUROSEMIDE 40 MILLIGRAM(S): 10 INJECTION INTRAMUSCULAR; INTRAVENOUS at 05:32

## 2025-03-22 RX ADMIN — INSULIN LISPRO 2: 100 INJECTION, SOLUTION INTRAVENOUS; SUBCUTANEOUS at 12:32

## 2025-03-22 RX ADMIN — Medication 20 MILLIGRAM(S): at 18:25

## 2025-03-22 RX ADMIN — MONTELUKAST SODIUM 10 MILLIGRAM(S): 10 TABLET ORAL at 12:33

## 2025-03-22 NOTE — CONSULT NOTE ADULT - PROBLEM SELECTOR RECOMMENDATION 9
new onset dm with severe hyperglycemia  lantus 16 units  admelog 4 ac tid  cont metformin   dm teaching to family- pt legally blind  consider lantus and oral dm meds ?  pauly simpson and hs

## 2025-03-22 NOTE — PROGRESS NOTE ADULT - PROBLEM SELECTOR PLAN 1
BG>500 in ED  hx of predm  s/p 8 u regular insulin and IVF  started ISS  NPO until BG<200 then start diet but no chicken,turkey or eggs (no poultry)  started 16 units glargine, 4 premeals, and metformin 500BID  f/u A1c    Endo consulted Dr. Funk BG>500 in ED  hx of predm  s/p 8 u regular insulin and IVF  started ISS  NPO until BG<200 then start diet but no chicken,turkey or eggs (no poultry)  started 16 units glargine, 4 premeals, and metformin 500BID  A1c= 10.6    Endo consulted Dr. Funk

## 2025-03-22 NOTE — PROGRESS NOTE ADULT - ASSESSMENT
89 yo F, w/ morbid obesity, is legally blind ambulates with walker, with PMH of HTN, HLD, COPD (never smoker, on 2L O2 at home), BIPAP at night , HFpEF (EF 55-60%), severe RV, Afib on Eliquis, Pulmonary HTN, frequent UTI's presents from her doctors office with hyperglycemia. Patient admitted for uncontrolled hyperglycemia and UTI. Consulted neuro for ?worsening dementia and continuing on IV Abx.

## 2025-03-22 NOTE — CONSULT NOTE ADULT - SUBJECTIVE AND OBJECTIVE BOX
Patient is a 89y old  Female who is morbid obesity, is legally blind ambulates with walker, and PMH of HTN, HLD, COPD (never smoker, on 2L O2 at home), BIPAP at night , HFpEF (EF 55-60%), severe RV, Afib on Eliquis, Pulmonary HTN, frequent UTI's now sent in to the ER from her doctors office for evaluation of hyperglycemia. Collateral obtained from granddaughter Martin Churchill [phone]. Patient has a history of pre DM and her A1c on her usual visits has always been in the 5's and she didnot require any medication. She was not complaining of anything specifically but at the doctors appointment, the high blood sugar and general appearance prompted them to send the patient to the ER. On admission, she found to have no fever but positive Urine analysis. She has started on Ertapenem and the ID consult requested to assist with further evaluation and antibiotic management.       REVIEW OF SYSTEMS: Total of twelve systems have been reviewed with patient and found to be negative unless mentioned in HPI      PAST MEDICAL & SURGICAL HISTORY:  Arthritis  Legally blind  Pre-diabetes  Right Breast cancer, no chemo or radiation  Atrial fibrillation  HF (heart failure), HFpEF 7/29  Deep vein thrombosis (DVT)  Left Lower Extremity  HLD (hyperlipidemia)  COPD exacerbation  HTN (hypertension)  No significant past surgical history      SOCIAL HISTORY  Alcohol: Does not drink  Tobacco: Does not smoke  Illicit substance use: None      FAMILY HISTORY: Non contributory to the present illness      ALLERGIES: Chicken (Stomach Upset)  losartan (Angioedema)      Vital Signs Last 24 Hrs  T(C): 36.9 (22 Mar 2025 04:58), Max: 36.9 (22 Mar 2025 04:58)  T(F): 98.4 (22 Mar 2025 04:58), Max: 98.4 (22 Mar 2025 04:58)  HR: 80 (22 Mar 2025 04:58) (68 - 85)  BP: 140/87 (22 Mar 2025 04:58) (124/96 - 145/93)  BP(mean): --  RR: 18 (22 Mar 2025 04:58) (16 - 18)  SpO2: 100% (22 Mar 2025 04:58) (95% - 100%)    Parameters below as of 22 Mar 2025 04:58  Patient On (Oxygen Delivery Method): room air      PHYSICAL EXAM:  GENERAL: Not in distress   CHEST/LUNG:  Air entry bilaterally  HEART: s1 and s2 present  ABDOMEN:  Nontender and  Nondistended  EXTREMITIES: No pedal  edema  CNS: Awake and Alert      LABS:                        14.6   9.07  )-----------( 144      ( 22 Mar 2025 05:45 )             41.5       03-22    133[L]  |  94[L]  |  21[H]  ----------------------------<  348[H]  3.9   |  35[H]  |  0.85    Ca    8.5      22 Mar 2025 05:45  Phos  2.5     03-22  Mg     2.0     03-22    TPro  5.7[L]  /  Alb  2.8[L]  /  TBili  0.8  /  DBili  x   /  AST  12  /  ALT  28  /  AlkPhos  112  03-22      CAPILLARY BLOOD GLUCOSE  POCT Blood Glucose.: 218 mg/dL (22 Mar 2025 12:30)  POCT Blood Glucose.: 318 mg/dL (22 Mar 2025 07:59)  POCT Blood Glucose.: 304 mg/dL (22 Mar 2025 05:39)  POCT Blood Glucose.: 370 mg/dL (21 Mar 2025 18:00)  POCT Blood Glucose.: 572 mg/dL (21 Mar 2025 14:19)        MEDICATIONS  (STANDING):  anastrozole 1 milliGRAM(s) Oral daily  apixaban 5 milliGRAM(s) Oral every 12 hours  atorvastatin 10 milliGRAM(s) Oral at bedtime  carvedilol 6.25 milliGRAM(s) Oral every 12 hours  ertapenem  IVPB 1000 milliGRAM(s) IV Intermittent every 24 hours  famotidine    Tablet 20 milliGRAM(s) Oral two times a day  fluticasone propionate/ salmeterol 100-50 MICROgram(s) Diskus 1 Dose(s) Inhalation two times a day  furosemide    Tablet 40 milliGRAM(s) Oral daily  hydrALAZINE 50 milliGRAM(s) Oral every 8 hours  insulin glargine Injectable (LANTUS) 16 Unit(s) SubCutaneous at bedtime  insulin lispro (ADMELOG) corrective regimen sliding scale   SubCutaneous three times a day before meals  insulin lispro (ADMELOG) corrective regimen sliding scale   SubCutaneous at bedtime  insulin lispro Injectable (ADMELOG) 4 Unit(s) SubCutaneous three times a day before meals  lactated ringers. 1000 milliLiter(s) (70 mL/Hr) IV Continuous <Continuous>  metFORMIN 500 milliGRAM(s) Oral two times a day  montelukast 10 milliGRAM(s) Oral daily  tiotropium 2.5 MICROgram(s) Inhaler 2 Puff(s) Inhalation daily    MEDICATIONS  (PRN):  acetaminophen     Tablet .. 650 milliGRAM(s) Oral every 6 hours PRN Temp greater or equal to 38C (100.4F), Mild Pain (1 - 3)  albuterol    90 MICROgram(s) HFA Inhaler 2 Puff(s) Inhalation every 6 hours PRN for bronchospasm  aluminum hydroxide/magnesium hydroxide/simethicone Suspension 30 milliLiter(s) Oral every 4 hours PRN Dyspepsia  melatonin 3 milliGRAM(s) Oral at bedtime PRN Insomnia  ondansetron Injectable 4 milliGRAM(s) IV Push every 8 hours PRN Nausea and/or Vomiting      RADIOLOGY & ADDITIONAL TESTS:    3/21/25: Xray Chest 1 View-PORTABLE IMMEDIATE (Xray Chest 1 View-PORTABLE IMMEDIATE .) (03.21.25 @ 17:11) IMPRESSION: No acute cardiopulmonary disease process. Cardiomegaly.      MICROBIOLOGY DATA:    Urine Microscopic-Add On (NC) (03.21.25 @ 15:15)   White Blood Cell - Urine: >100 /HPF  Red Blood Cell - Urine: 4 /HPF  Bacteria: Few /HPF  Squamous Epithelial Cells: Present: FEW  Comment - Urine: Occasional budding yeasts present

## 2025-03-22 NOTE — PROGRESS NOTE ADULT - SUBJECTIVE AND OBJECTIVE BOX
Patient was seen and examined  Patient is a 89y old  Female who presents with a chief complaint of Hyperglycemia, UTI (22 Mar 2025 10:17)      INTERVAL HPI/OVERNIGHT EVENTS:  T(C): 36.9 (03-22-25 @ 04:58), Max: 36.9 (03-21-25 @ 13:36)  HR: 80 (03-22-25 @ 04:58) (68 - 85)  BP: 140/87 (03-22-25 @ 04:58) (124/96 - 145/93)  RR: 18 (03-22-25 @ 04:58) (16 - 18)  SpO2: 100% (03-22-25 @ 04:58) (95% - 100%)  Wt(kg): --  I&O's Summary    21 Mar 2025 07:01  -  22 Mar 2025 07:00  --------------------------------------------------------  IN: 0 mL / OUT: 600 mL / NET: -600 mL        LABS:                        14.6   9.07  )-----------( 144      ( 22 Mar 2025 05:45 )             41.5     03-22    133[L]  |  94[L]  |  21[H]  ----------------------------<  348[H]  3.9   |  35[H]  |  0.85    Ca    8.5      22 Mar 2025 05:45  Phos  2.5     03-22  Mg     2.0     03-22    TPro  5.7[L]  /  Alb  2.8[L]  /  TBili  0.8  /  DBili  x   /  AST  12  /  ALT  28  /  AlkPhos  112  03-22      Urinalysis Basic - ( 22 Mar 2025 05:45 )    Color: x / Appearance: x / SG: x / pH: x  Gluc: 348 mg/dL / Ketone: x  / Bili: x / Urobili: x   Blood: x / Protein: x / Nitrite: x   Leuk Esterase: x / RBC: x / WBC x   Sq Epi: x / Non Sq Epi: x / Bacteria: x      CAPILLARY BLOOD GLUCOSE      POCT Blood Glucose.: 318 mg/dL (22 Mar 2025 07:59)  POCT Blood Glucose.: 304 mg/dL (22 Mar 2025 05:39)  POCT Blood Glucose.: 337 mg/dL (22 Mar 2025 03:04)  POCT Blood Glucose.: 326 mg/dL (22 Mar 2025 03:00)  POCT Blood Glucose.: 369 mg/dL (21 Mar 2025 21:58)  POCT Blood Glucose.: 370 mg/dL (21 Mar 2025 18:00)  POCT Blood Glucose.: 506 mg/dL (21 Mar 2025 17:06)  POCT Blood Glucose.: 572 mg/dL (21 Mar 2025 14:19)          Urinalysis Basic - ( 22 Mar 2025 05:45 )    Color: x / Appearance: x / SG: x / pH: x  Gluc: 348 mg/dL / Ketone: x  / Bili: x / Urobili: x   Blood: x / Protein: x / Nitrite: x   Leuk Esterase: x / RBC: x / WBC x   Sq Epi: x / Non Sq Epi: x / Bacteria: x        MEDICATIONS  (STANDING):  anastrozole 1 milliGRAM(s) Oral daily  apixaban 5 milliGRAM(s) Oral every 12 hours  atorvastatin 10 milliGRAM(s) Oral at bedtime  carvedilol 6.25 milliGRAM(s) Oral every 12 hours  ertapenem  IVPB 1000 milliGRAM(s) IV Intermittent every 24 hours  famotidine    Tablet 20 milliGRAM(s) Oral two times a day  fluticasone propionate/ salmeterol 100-50 MICROgram(s) Diskus 1 Dose(s) Inhalation two times a day  furosemide    Tablet 40 milliGRAM(s) Oral daily  hydrALAZINE 50 milliGRAM(s) Oral every 8 hours  insulin glargine Injectable (LANTUS) 16 Unit(s) SubCutaneous at bedtime  insulin lispro (ADMELOG) corrective regimen sliding scale   SubCutaneous three times a day before meals  insulin lispro (ADMELOG) corrective regimen sliding scale   SubCutaneous at bedtime  insulin lispro Injectable (ADMELOG) 4 Unit(s) SubCutaneous three times a day before meals  lactated ringers. 1000 milliLiter(s) (70 mL/Hr) IV Continuous <Continuous>  metFORMIN 500 milliGRAM(s) Oral two times a day  montelukast 10 milliGRAM(s) Oral daily  tiotropium 2.5 MICROgram(s) Inhaler 2 Puff(s) Inhalation daily    MEDICATIONS  (PRN):  acetaminophen     Tablet .. 650 milliGRAM(s) Oral every 6 hours PRN Temp greater or equal to 38C (100.4F), Mild Pain (1 - 3)  albuterol    90 MICROgram(s) HFA Inhaler 2 Puff(s) Inhalation every 6 hours PRN for bronchospasm  aluminum hydroxide/magnesium hydroxide/simethicone Suspension 30 milliLiter(s) Oral every 4 hours PRN Dyspepsia  melatonin 3 milliGRAM(s) Oral at bedtime PRN Insomnia  ondansetron Injectable 4 milliGRAM(s) IV Push every 8 hours PRN Nausea and/or Vomiting      RADIOLOGY & ADDITIONAL TESTS:    Imaging Personally Reviewed:  [ ] YES  [ ] NO    REVIEW OF SYSTEMS:  unable to provide       Consultant(s) Notes Reviewed:  [ ] YES  [ ] NO    PHYSICAL EXAM:  GENERAL: NAD, well-groomed, well-developed  HEAD:  Atraumatic, Normocephalic  EYES: EOMI, PERRLA, conjunctiva and sclera clear  ENMT: No tonsillar erythema, exudates, or enlargement; Moist mucous membranes, Good dentition, No lesions  NECK: Supple, No JVD, Normal thyroid  NERVOUS SYSTEM:  awake poor gait   CHEST/LUNG: bl rhonchi   HEART: Regular rate and rhythm; No murmurs, rubs, or gallops  ABDOMEN: Soft, Nontender, Nondistended; Bowel sounds present  EXTREMITIES:  2+ Peripheral Pulses, No clubbing, cyanosis, or edema  LYMPH: No lymphadenopathy noted  SKIN: No rashes or lesions    Care Discussed with Consultants/Other Providers [ x] YES  [ ] NO

## 2025-03-22 NOTE — PROGRESS NOTE ADULT - PROBLEM SELECTOR PLAN 1
BG>500 in ED  hx of predementia  neurology eval   cardi eval dr pulido   soft diet  endo consult  add metformin 500 bid

## 2025-03-22 NOTE — CONSULT NOTE ADULT - SUBJECTIVE AND OBJECTIVE BOX
Patient is a 89y old  Female who presents with a chief complaint of Hyperglycemia, UTI (22 Mar 2025 11:20)      HPI:  89 yo F, w/ morbid obesity, is legally blind ambulates with walker, with PMH of HTN, HLD, COPD (never smoker, on 2L O2 at home), BIPAP at night , HFpEF (EF 55-60%), severe RV, Afib on Eliquis, Pulmonary HTN, frequent UTI's presents from her doctors office with hyperglycemia. Collateral obtained from granddaughter elder duong [phone]. Patient has a history of pre DM and her a1c on her usual visits has always been in the 5's and she didnt require any medication. She was not complaining of anything specifically but at the doctors appointment, the high blood sugar and general appearance prompted them to send the patient to the ED. Patient denies any fever, chills, dizziness ,headache, SOB, cough, chest pain, palpitations, nausea, vomiting, diarrhea, abdominal pain, urinary symptoms, lower extremity pain, or edema. Patient denies recent travel or sick contacts.  (21 Mar 2025 17:36)  Endocrine consulted for dm management.     PAST MEDICAL & SURGICAL HISTORY:  Arthritis      Legally blind      Pre-diabetes      Breast cancer  right, no chemo or radiation      COPD exacerbation      Atrial fibrillation      HF (heart failure)  HFpEF 7/29      Deep vein thrombosis (DVT)  Left Lower Extremity      HLD (hyperlipidemia)      COPD exacerbation      HTN (hypertension)      No significant past surgical history             MEDICATIONS  (STANDING):  anastrozole 1 milliGRAM(s) Oral daily  apixaban 5 milliGRAM(s) Oral every 12 hours  atorvastatin 10 milliGRAM(s) Oral at bedtime  carvedilol 6.25 milliGRAM(s) Oral every 12 hours  ertapenem  IVPB 1000 milliGRAM(s) IV Intermittent every 24 hours  famotidine    Tablet 20 milliGRAM(s) Oral two times a day  fluticasone propionate/ salmeterol 100-50 MICROgram(s) Diskus 1 Dose(s) Inhalation two times a day  furosemide    Tablet 40 milliGRAM(s) Oral daily  hydrALAZINE 50 milliGRAM(s) Oral every 8 hours  insulin glargine Injectable (LANTUS) 16 Unit(s) SubCutaneous at bedtime  insulin lispro (ADMELOG) corrective regimen sliding scale   SubCutaneous three times a day before meals  insulin lispro (ADMELOG) corrective regimen sliding scale   SubCutaneous at bedtime  insulin lispro Injectable (ADMELOG) 4 Unit(s) SubCutaneous three times a day before meals  lactated ringers. 1000 milliLiter(s) (70 mL/Hr) IV Continuous <Continuous>  metFORMIN 500 milliGRAM(s) Oral two times a day  montelukast 10 milliGRAM(s) Oral daily  tiotropium 2.5 MICROgram(s) Inhaler 2 Puff(s) Inhalation daily    MEDICATIONS  (PRN):  acetaminophen     Tablet .. 650 milliGRAM(s) Oral every 6 hours PRN Temp greater or equal to 38C (100.4F), Mild Pain (1 - 3)  albuterol    90 MICROgram(s) HFA Inhaler 2 Puff(s) Inhalation every 6 hours PRN for bronchospasm  aluminum hydroxide/magnesium hydroxide/simethicone Suspension 30 milliLiter(s) Oral every 4 hours PRN Dyspepsia  melatonin 3 milliGRAM(s) Oral at bedtime PRN Insomnia  ondansetron Injectable 4 milliGRAM(s) IV Push every 8 hours PRN Nausea and/or Vomiting      FAMILY HISTORY:  FH: HTN (hypertension)        SOCIAL HISTORY:      REVIEW OF SYSTEMS:  CONSTITUTIONAL: No fever, weight loss, or fatigue  EYES: No eye pain, visual disturbances, or discharge  ENT:  No difficulty hearing, tinnitus, vertigo; No sinus or throat pain  NECK: No pain or stiffness  RESPIRATORY: No cough, wheezing, chills or hemoptysis; No Shortness of Breath  CARDIOVASCULAR: No chest pain, palpitations, passing out, dizziness, or leg swelling  GASTROINTESTINAL: No abdominal or epigastric pain. No nausea, vomiting, or hematemesis; No diarrhea or constipation. No melena or hematochezia.  GENITOURINARY: No dysuria, frequency, hematuria, or incontinence  NEUROLOGICAL: No headaches, memory loss, loss of strength, numbness, or tremors  SKIN: No itching, burning, rashes, or lesions   LYMPH Nodes: No enlarged glands  ENDOCRINE: No heat or cold intolerance; No hair loss  MUSCULOSKELETAL: No joint pain or swelling; No muscle, back, or extremity pain  PSYCHIATRIC: No depression, anxiety, mood swings, or difficulty sleeping  HEME/LYMPH: No easy bruising, or bleeding gums  ALLERGY AND IMMUNOLOGIC: No hives or eczema	        Vital Signs Last 24 Hrs  T(C): 36.9 (22 Mar 2025 04:58), Max: 36.9 (21 Mar 2025 13:36)  T(F): 98.4 (22 Mar 2025 04:58), Max: 98.4 (21 Mar 2025 13:36)  HR: 80 (22 Mar 2025 04:58) (68 - 85)  BP: 140/87 (22 Mar 2025 04:58) (124/96 - 145/93)  BP(mean): --  RR: 18 (22 Mar 2025 04:58) (16 - 18)  SpO2: 100% (22 Mar 2025 04:58) (95% - 100%)    Parameters below as of 22 Mar 2025 04:58  Patient On (Oxygen Delivery Method): room air          Constitutional:    HEENT: nad    Neck:  No JVD, bruits or thyromegaly    Respiratory:  Clear without rales or rhonchi    Cardiovascular:  RR without murmur, rub or gallop.    Gastrointestinal: Soft without hepatosplenomegaly.    Extremities: without cyanosis, clubbing or edema.    Neurological:  Oriented   x  3    . No gross sensory or motor defects.        LABS:                        14.6   9.07  )-----------( 144      ( 22 Mar 2025 05:45 )             41.5     03-22    133[L]  |  94[L]  |  21[H]  ----------------------------<  348[H]  3.9   |  35[H]  |  0.85    Ca    8.5      22 Mar 2025 05:45  Phos  2.5     03-22  Mg     2.0     03-22    TPro  5.7[L]  /  Alb  2.8[L]  /  TBili  0.8  /  DBili  x   /  AST  12  /  ALT  28  /  AlkPhos  112  03-22          Urinalysis Basic - ( 22 Mar 2025 05:45 )    Color: x / Appearance: x / SG: x / pH: x  Gluc: 348 mg/dL / Ketone: x  / Bili: x / Urobili: x   Blood: x / Protein: x / Nitrite: x   Leuk Esterase: x / RBC: x / WBC x   Sq Epi: x / Non Sq Epi: x / Bacteria: x      CAPILLARY BLOOD GLUCOSE      POCT Blood Glucose.: 218 mg/dL (22 Mar 2025 12:30)  POCT Blood Glucose.: 318 mg/dL (22 Mar 2025 07:59)  POCT Blood Glucose.: 304 mg/dL (22 Mar 2025 05:39)  POCT Blood Glucose.: 337 mg/dL (22 Mar 2025 03:04)  POCT Blood Glucose.: 326 mg/dL (22 Mar 2025 03:00)  POCT Blood Glucose.: 369 mg/dL (21 Mar 2025 21:58)  POCT Blood Glucose.: 370 mg/dL (21 Mar 2025 18:00)  POCT Blood Glucose.: 506 mg/dL (21 Mar 2025 17:06)  POCT Blood Glucose.: 572 mg/dL (21 Mar 2025 14:19)      RADIOLOGY & ADDITIONAL STUDIES: Patient is a 89y old  Female who presents with a chief complaint of Hyperglycemia, UTI (22 Mar 2025 11:20)      HPI:  89 yo F, w/ morbid obesity, is legally blind ambulates with walker, with PMH of HTN, HLD, COPD (never smoker, on 2L O2 at home), BIPAP at night , HFpEF (EF 55-60%), severe RV, Afib on Eliquis, Pulmonary HTN, frequent UTI's presents from her doctors office with hyperglycemia. Collateral obtained from granddaughter elder duong [phone]. Patient has a history of pre DM and her a1c on her usual visits has always been in the 5's and she didnt require any medication. She was not complaining of anything specifically but at the doctors appointment, the high blood sugar and general appearance prompted them to send the patient to the ED. Patient denies any fever, chills, dizziness ,headache, SOB, cough, chest pain, palpitations, nausea, vomiting, diarrhea, abdominal pain, urinary symptoms, lower extremity pain, or edema. Patient denies recent travel or sick contacts.  (21 Mar 2025 17:36)  Endocrine consulted for dm management. Pt has hx of pre dm - on no meds.     PAST MEDICAL & SURGICAL HISTORY:  Arthritis      Legally blind      Pre-diabetes      Breast cancer  right, no chemo or radiation      COPD exacerbation      Atrial fibrillation      HF (heart failure)  HFpEF 7/29      Deep vein thrombosis (DVT)  Left Lower Extremity      HLD (hyperlipidemia)      COPD exacerbation      HTN (hypertension)      No significant past surgical history             MEDICATIONS  (STANDING):  anastrozole 1 milliGRAM(s) Oral daily  apixaban 5 milliGRAM(s) Oral every 12 hours  atorvastatin 10 milliGRAM(s) Oral at bedtime  carvedilol 6.25 milliGRAM(s) Oral every 12 hours  ertapenem  IVPB 1000 milliGRAM(s) IV Intermittent every 24 hours  famotidine    Tablet 20 milliGRAM(s) Oral two times a day  fluticasone propionate/ salmeterol 100-50 MICROgram(s) Diskus 1 Dose(s) Inhalation two times a day  furosemide    Tablet 40 milliGRAM(s) Oral daily  hydrALAZINE 50 milliGRAM(s) Oral every 8 hours  insulin glargine Injectable (LANTUS) 16 Unit(s) SubCutaneous at bedtime  insulin lispro (ADMELOG) corrective regimen sliding scale   SubCutaneous three times a day before meals  insulin lispro (ADMELOG) corrective regimen sliding scale   SubCutaneous at bedtime  insulin lispro Injectable (ADMELOG) 4 Unit(s) SubCutaneous three times a day before meals  lactated ringers. 1000 milliLiter(s) (70 mL/Hr) IV Continuous <Continuous>  metFORMIN 500 milliGRAM(s) Oral two times a day  montelukast 10 milliGRAM(s) Oral daily  tiotropium 2.5 MICROgram(s) Inhaler 2 Puff(s) Inhalation daily    MEDICATIONS  (PRN):  acetaminophen     Tablet .. 650 milliGRAM(s) Oral every 6 hours PRN Temp greater or equal to 38C (100.4F), Mild Pain (1 - 3)  albuterol    90 MICROgram(s) HFA Inhaler 2 Puff(s) Inhalation every 6 hours PRN for bronchospasm  aluminum hydroxide/magnesium hydroxide/simethicone Suspension 30 milliLiter(s) Oral every 4 hours PRN Dyspepsia  melatonin 3 milliGRAM(s) Oral at bedtime PRN Insomnia  ondansetron Injectable 4 milliGRAM(s) IV Push every 8 hours PRN Nausea and/or Vomiting      FAMILY HISTORY:  FH: HTN (hypertension)        SOCIAL HISTORY:      REVIEW OF SYSTEMS:  CONSTITUTIONAL: No fever, weight loss, or fatigue  EYES: No eye pain, visual disturbances, or discharge  ENT:  No difficulty hearing, tinnitus, vertigo; No sinus or throat pain  NECK: No pain or stiffness  RESPIRATORY: No cough, wheezing, chills or hemoptysis; No Shortness of Breath  CARDIOVASCULAR: No chest pain, palpitations, passing out, dizziness, or leg swelling  GASTROINTESTINAL: No abdominal or epigastric pain. No nausea, vomiting, or hematemesis; No diarrhea or constipation. No melena or hematochezia.  GENITOURINARY: No dysuria, frequency, hematuria, or incontinence  NEUROLOGICAL: No headaches, memory loss, loss of strength, numbness, or tremors  SKIN: No itching, burning, rashes, or lesions   LYMPH Nodes: No enlarged glands  ENDOCRINE: No heat or cold intolerance; No hair loss  MUSCULOSKELETAL: No joint pain or swelling; No muscle, back, or extremity pain  PSYCHIATRIC: No depression, anxiety, mood swings, or difficulty sleeping  HEME/LYMPH: No easy bruising, or bleeding gums  ALLERGY AND IMMUNOLOGIC: No hives or eczema	        Vital Signs Last 24 Hrs  T(C): 36.9 (22 Mar 2025 04:58), Max: 36.9 (21 Mar 2025 13:36)  T(F): 98.4 (22 Mar 2025 04:58), Max: 98.4 (21 Mar 2025 13:36)  HR: 80 (22 Mar 2025 04:58) (68 - 85)  BP: 140/87 (22 Mar 2025 04:58) (124/96 - 145/93)  BP(mean): --  RR: 18 (22 Mar 2025 04:58) (16 - 18)  SpO2: 100% (22 Mar 2025 04:58) (95% - 100%)    Parameters below as of 22 Mar 2025 04:58  Patient On (Oxygen Delivery Method): room air          Constitutional:    HEENT: nad    Neck:  No JVD, bruits or thyromegaly    Respiratory:  Clear without rales or rhonchi    Cardiovascular:  RR without murmur, rub or gallop.    Gastrointestinal: Soft without hepatosplenomegaly.    Extremities: without cyanosis, clubbing or edema.    Neurological:  Oriented   x  3    . No gross sensory or motor defects.        LABS:                        14.6   9.07  )-----------( 144      ( 22 Mar 2025 05:45 )             41.5     03-22    133[L]  |  94[L]  |  21[H]  ----------------------------<  348[H]  3.9   |  35[H]  |  0.85    Ca    8.5      22 Mar 2025 05:45  Phos  2.5     03-22  Mg     2.0     03-22    TPro  5.7[L]  /  Alb  2.8[L]  /  TBili  0.8  /  DBili  x   /  AST  12  /  ALT  28  /  AlkPhos  112  03-22          Urinalysis Basic - ( 22 Mar 2025 05:45 )    Color: x / Appearance: x / SG: x / pH: x  Gluc: 348 mg/dL / Ketone: x  / Bili: x / Urobili: x   Blood: x / Protein: x / Nitrite: x   Leuk Esterase: x / RBC: x / WBC x   Sq Epi: x / Non Sq Epi: x / Bacteria: x      CAPILLARY BLOOD GLUCOSE      POCT Blood Glucose.: 218 mg/dL (22 Mar 2025 12:30)  POCT Blood Glucose.: 318 mg/dL (22 Mar 2025 07:59)  POCT Blood Glucose.: 304 mg/dL (22 Mar 2025 05:39)  POCT Blood Glucose.: 337 mg/dL (22 Mar 2025 03:04)  POCT Blood Glucose.: 326 mg/dL (22 Mar 2025 03:00)  POCT Blood Glucose.: 369 mg/dL (21 Mar 2025 21:58)  POCT Blood Glucose.: 370 mg/dL (21 Mar 2025 18:00)  POCT Blood Glucose.: 506 mg/dL (21 Mar 2025 17:06)  POCT Blood Glucose.: 572 mg/dL (21 Mar 2025 14:19)      RADIOLOGY & ADDITIONAL STUDIES:

## 2025-03-22 NOTE — CONSULT NOTE ADULT - SUBJECTIVE AND OBJECTIVE BOX
Date of Service  03-22-25 @ 10:17    CHIEF COMPLAINT:Patient is a 89y old  Female who presents with a chief complaint of Hyperglycemia, UTI.      HPI:  89 yo F, w/ morbid obesity, is legally blind ambulates with walker, with PMH of HTN, HLD, COPD (never smoker, on 2L O2 at home), BIPAP at night , HFpEF (EF 55-60%), severe RV, Afib on Eliquis, Pulmonary HTN, frequent UTI's presents from her doctors office with hyperglycemia. Collateral obtained from granddaughter elder duong [phone]. Patient has a history of pre DM and her a1c on her usual visits has always been in the 5's and she didnt require any medication. She was not complaining of anything specifically but at the doctors appointment, the high blood sugar and general appearance prompted them to send the patient to the ED. Patient denies any fever, chills, dizziness ,headache, SOB, cough, chest pain, palpitations, nausea, vomiting, diarrhea, abdominal pain, urinary symptoms, lower extremity pain, or edema. Patient denies recent travel or sick contacts.  (21 Mar 2025 17:36)      PAST MEDICAL & SURGICAL HISTORY:  Arthritis      Legally blind      Pre-diabetes      Breast cancer  right, no chemo or radiation      COPD exacerbation      Atrial fibrillation      HF (heart failure)  HFpEF 7/29      Deep vein thrombosis (DVT)  Left Lower Extremity      HLD (hyperlipidemia)      COPD exacerbation      HTN (hypertension)      MEDICATIONS  (STANDING):  anastrozole 1 milliGRAM(s) Oral daily  apixaban 5 milliGRAM(s) Oral every 12 hours  atorvastatin 10 milliGRAM(s) Oral at bedtime  carvedilol 6.25 milliGRAM(s) Oral every 12 hours  ertapenem  IVPB 1000 milliGRAM(s) IV Intermittent every 24 hours  famotidine    Tablet 20 milliGRAM(s) Oral two times a day  fluticasone propionate/ salmeterol 100-50 MICROgram(s) Diskus 1 Dose(s) Inhalation two times a day  furosemide    Tablet 40 milliGRAM(s) Oral daily  hydrALAZINE 50 milliGRAM(s) Oral every 8 hours  insulin glargine Injectable (LANTUS) 16 Unit(s) SubCutaneous at bedtime  insulin lispro (ADMELOG) corrective regimen sliding scale   SubCutaneous three times a day before meals  insulin lispro (ADMELOG) corrective regimen sliding scale   SubCutaneous at bedtime  insulin lispro Injectable (ADMELOG) 4 Unit(s) SubCutaneous three times a day before meals  lactated ringers. 1000 milliLiter(s) (70 mL/Hr) IV Continuous <Continuous>  metFORMIN 500 milliGRAM(s) Oral two times a day  montelukast 10 milliGRAM(s) Oral daily  tiotropium 2.5 MICROgram(s) Inhaler 2 Puff(s) Inhalation daily    MEDICATIONS  (PRN):  acetaminophen     Tablet .. 650 milliGRAM(s) Oral every 6 hours PRN Temp greater or equal to 38C (100.4F), Mild Pain (1 - 3)  albuterol    90 MICROgram(s) HFA Inhaler 2 Puff(s) Inhalation every 6 hours PRN for bronchospasm  aluminum hydroxide/magnesium hydroxide/simethicone Suspension 30 milliLiter(s) Oral every 4 hours PRN Dyspepsia  melatonin 3 milliGRAM(s) Oral at bedtime PRN Insomnia  ondansetron Injectable 4 milliGRAM(s) IV Push every 8 hours PRN Nausea and/or Vomiting      FAMILY HISTORY:  FH: HTN (hypertension)        SOCIAL HISTORY:    [x ] Non-smoker    [x ] Alcohol-denies    Allergies    losartan (Angioedema)    Intolerances    Chicken (Stomach Upset)  	    REVIEW OF SYSTEMS:  CONSTITUTIONAL: No fever, weight loss, or fatigue  EYES: No eye pain, visual disturbances, or discharge  ENT:  No difficulty hearing, tinnitus, vertigo; No sinus or throat pain  NECK: No pain or stiffness  RESPIRATORY: No cough, wheezing, chills or hemoptysis; No Shortness of Breath  CARDIOVASCULAR: No chest pain, palpitations, passing out, dizziness, or leg swelling  GASTROINTESTINAL: No abdominal or epigastric pain. No nausea, vomiting, or hematemesis; No diarrhea or constipation. No melena or hematochezia.  GENITOURINARY: No dysuria, frequency, hematuria, or incontinence  NEUROLOGICAL: No headaches, memory loss, loss of strength, numbness, or tremors  SKIN: No itching, burning, rashes, or lesions   LYMPH Nodes: No enlarged glands  ENDOCRINE: No heat or cold intolerance; No hair loss  MUSCULOSKELETAL: No joint pain or swelling; No muscle, back, or extremity pain  PSYCHIATRIC: No depression, anxiety, mood swings, or difficulty sleeping  HEME/LYMPH: No easy bruising, or bleeding gums  ALLERGY AND IMMUNOLOGIC: No hives or eczema	    PHYSICAL EXAM:  T(C): 36.9 (03-22-25 @ 04:58), Max: 36.9 (03-21-25 @ 13:36)  HR: 80 (03-22-25 @ 04:58) (68 - 85)  BP: 140/87 (03-22-25 @ 04:58) (124/96 - 145/93)  RR: 18 (03-22-25 @ 04:58) (16 - 18)  SpO2: 100% (03-22-25 @ 04:58) (95% - 100%)  Wt(kg): --  I&O's Summary    21 Mar 2025 07:01  -  22 Mar 2025 07:00  --------------------------------------------------------  IN: 0 mL / OUT: 600 mL / NET: -600 mL        Appearance: Normal	  HEENT:   Normal oral mucosa, PERRL, EOMI	  Lymphatic: No lymphadenopathy  Cardiovascular: Normal S1 S2, No JVD, No murmurs, No edema  Respiratory: Lungs clear to auscultation	  Psychiatry: A & O x 3, Mood & affect appropriate  Gastrointestinal:  Soft, Non-tender, + BS	  Skin: No rashes, No ecchymoses, No cyanosis	  Neurologic: Non-focal  Extremities: Normal range of motion, No clubbing, cyanosis or edema  Vascular: Peripheral pulses palpable 2+ bilaterally    	    ECG:    Atrial fibrillation  Low voltage QRS  Cannot rule out Anterior infarct , age undetermined    	  	  LABS:	 	    Troponin I, High Sensitivity Result: 72.3 ng/L (03-21-25 @ 18:00)  Troponin I, High Sensitivity Result: 77.0 ng/L (03-21-25 @ 15:37)                          14.6   9.07  )-----------( 144      ( 22 Mar 2025 05:45 )             41.5     03-22    133[L]  |  94[L]  |  21[H]  ----------------------------<  348[H]  3.9   |  35[H]  |  0.85    Ca    8.5      22 Mar 2025 05:45  Phos  2.5     03-22  Mg     2.0     03-22    TPro  5.7[L]  /  Alb  2.8[L]  /  TBili  0.8  /  DBili  x   /  AST  12  /  ALT  28  /  AlkPhos  112  03-22    < from: Xray Chest 1 View-PORTABLE IMMEDIATE (Xray Chest 1 View-PORTABLE IMMEDIATE .) (03.21.25 @ 17:11) >  ACC: [id number] EXAM:  XR CHEST PORTABLE IMMED 1V   ORDERED BY: JAZMIN COX     PROCEDURE DATE:  03/21/2025          INTERPRETATION:  TECHNIQUE: Single portable view of the chest.    COMPARISON:  12/11/2024    CLINICAL HISTORY: malaise    FINDINGS:    Single frontal view of the chest demonstrates the lungs to be clear. The   cardiomediastinal silhouette is enlarged. No acute osseous abnormalities.      IMPRESSION: No acute cardiopulmonary disease process. Cardiomegaly.    --- End ofReport ---            SHREYAS DENNIS MD; Attending Radiologist  This document has been electronically signed. Mar 21 2025  9:43PM    < end of copied text >  < from: TTE W or WO Ultrasound Enhancing Agent (09.25.24 @ 10:54) >  CONCLUSIONS:      1. Technically difficult image quality.   2. Left atrium is severely dilated.   3. The right atrium is moderately dilated.   4. Left ventricular systolic function is normal with an ejection fraction of 57 % by Smith's method of disks.   5. There is increased LV mass and concentric hypertrophy.   6. Enlarged right ventricular cavity size and reduced right ventricular systolic function. Tricuspid annular plane systolic excursion (TAPSE) is 1.4 cm (normal >=1.7 cm).   7. Estimated pulmonary artery systolic pressure is 69 mmHg, consistent with severe pulmonary hypertension.    Urinalysis (03.21.25 @ 15:15)   Glucose Qualitative, Urine: >=1000 mg/dL  Blood, Urine: Non Hemolyzed Trace  pH Urine: 5.5  Color: Yellow  Urine Appearance: Cloudy  Bilirubin: Negative  Ketone - Urine: Negative mg/dL  Specific Gravity: 1.012  Protein, Urine: Negative mg/dL  Urobilinogen: 0.2 mg/dL  Nitrite: Negative  Leukocyte Esterase Concentration: ModerateUrine Microscopic-Add On (NC) (03.21.25 @ 15:15)   White Blood Cell - Urine: >100 /HPF  Red Blood Cell - Urine: 4 /HPF  Bacteria: Few /HPF  Squamous Epithelial Cells: Present: FEW  Comment - Urine: Occasional budding yeasts present

## 2025-03-22 NOTE — PATIENT PROFILE ADULT - VISION (WITH CORRECTIVE LENSES IF THE PATIENT USUALLY WEARS THEM):
GIGI/Severely impaired: cannot locate objects without hearing or touching them or patient nonresponsive.

## 2025-03-22 NOTE — PROGRESS NOTE ADULT - SUBJECTIVE AND OBJECTIVE BOX
Progress Note discussed with attending    MS TEAMS AUTHOR OF THIS NOTE TILL 5:00 PM  PLEASE CONTACT ON CALL TEAM:  - On Call Team (Please refer to Cassy) FROM 5:00 PM - 8:30PM  - Nightfloat Team FROM 8:30 -7:30 AM    INTERVAL HPI/OVERNIGHT EVENTS: No acute events overnight.  Patient examined at bedside this AM.  Patient confused asking me to pray for her. Denies any pain    MEDICATIONS  (STANDING):  anastrozole 1 milliGRAM(s) Oral daily  apixaban 5 milliGRAM(s) Oral every 12 hours  atorvastatin 10 milliGRAM(s) Oral at bedtime  carvedilol 6.25 milliGRAM(s) Oral every 12 hours  ertapenem  IVPB 1000 milliGRAM(s) IV Intermittent every 24 hours  famotidine    Tablet 20 milliGRAM(s) Oral two times a day  fluticasone propionate/ salmeterol 100-50 MICROgram(s) Diskus 1 Dose(s) Inhalation two times a day  furosemide    Tablet 40 milliGRAM(s) Oral daily  hydrALAZINE 50 milliGRAM(s) Oral every 8 hours  insulin glargine Injectable (LANTUS) 16 Unit(s) SubCutaneous at bedtime  insulin lispro (ADMELOG) corrective regimen sliding scale   SubCutaneous three times a day before meals  insulin lispro (ADMELOG) corrective regimen sliding scale   SubCutaneous at bedtime  insulin lispro Injectable (ADMELOG) 4 Unit(s) SubCutaneous three times a day before meals  lactated ringers. 1000 milliLiter(s) (70 mL/Hr) IV Continuous <Continuous>  metFORMIN 500 milliGRAM(s) Oral two times a day  montelukast 10 milliGRAM(s) Oral daily  tiotropium 2.5 MICROgram(s) Inhaler 2 Puff(s) Inhalation daily    MEDICATIONS  (PRN):  acetaminophen     Tablet .. 650 milliGRAM(s) Oral every 6 hours PRN Temp greater or equal to 38C (100.4F), Mild Pain (1 - 3)  albuterol    90 MICROgram(s) HFA Inhaler 2 Puff(s) Inhalation every 6 hours PRN for bronchospasm  aluminum hydroxide/magnesium hydroxide/simethicone Suspension 30 milliLiter(s) Oral every 4 hours PRN Dyspepsia  melatonin 3 milliGRAM(s) Oral at bedtime PRN Insomnia  ondansetron Injectable 4 milliGRAM(s) IV Push every 8 hours PRN Nausea and/or Vomiting      REVIEW OF SYSTEMS:  CONSTITUTIONAL: No fever, weight loss, or fatigue  RESPIRATORY: No shortness of breath  CARDIOVASCULAR: No chest pain  GASTROINTESTINAL: No abdominal pain.  GENITOURINARY: No dysuria  NEUROLOGICAL: No headaches  SKIN: No itching, burning, rashes    Vital Signs Last 24 Hrs  T(C): 36.9 (22 Mar 2025 04:58), Max: 36.9 (21 Mar 2025 13:36)  T(F): 98.4 (22 Mar 2025 04:58), Max: 98.4 (21 Mar 2025 13:36)  HR: 80 (22 Mar 2025 04:58) (68 - 85)  BP: 140/87 (22 Mar 2025 04:58) (124/96 - 145/93)  BP(mean): --  RR: 18 (22 Mar 2025 04:58) (16 - 18)  SpO2: 100% (22 Mar 2025 04:58) (95% - 100%)    Parameters below as of 22 Mar 2025 04:58  Patient On (Oxygen Delivery Method): room air        PHYSICAL EXAMINATION:  GENERAL: NAD, well built  HEAD:  Atraumatic, Normocephalic  EYES:  conjunctiva and sclera clear  CHEST/LUNG: Clear to auscultation. No rales, rhonchi, wheezing, or rubs  HEART: Regular rate and rhythm; No murmurs, rubs, or gallops  ABDOMEN: Soft, Nontender, Nondistended; Bowel sounds present  NERVOUS SYSTEM:  Alert & Oriented X3,    EXTREMITIES:  2+ Peripheral Pulses, No clubbing, cyanosis, or edema  SKIN: warm dry                          14.6   9.07  )-----------( 144      ( 22 Mar 2025 05:45 )             41.5     03-22    133[L]  |  94[L]  |  21[H]  ----------------------------<  348[H]  3.9   |  35[H]  |  0.85    Ca    8.5      22 Mar 2025 05:45  Phos  2.5     03-22  Mg     2.0     03-22    TPro  5.7[L]  /  Alb  2.8[L]  /  TBili  0.8  /  DBili  x   /  AST  12  /  ALT  28  /  AlkPhos  112  03-22    LIVER FUNCTIONS - ( 22 Mar 2025 05:45 )  Alb: 2.8 g/dL / Pro: 5.7 g/dL / ALK PHOS: 112 U/L / ALT: 28 U/L DA / AST: 12 U/L / GGT: x                  Progress Note discussed with attending    MS TEAMS AUTHOR OF THIS NOTE TILL 5:00 PM  PLEASE CONTACT ON CALL TEAM:  - On Call Team (Please refer to Cassy) FROM 5:00 PM - 8:30PM  - Nightfloat Team FROM 8:30 -7:30 AM    INTERVAL HPI/OVERNIGHT EVENTS: No acute events overnight.  Patient examined at bedside this AM.  Patient confused asking me to pray for her. Denies any pain or acute complaints.    MEDICATIONS  (STANDING):  anastrozole 1 milliGRAM(s) Oral daily  apixaban 5 milliGRAM(s) Oral every 12 hours  atorvastatin 10 milliGRAM(s) Oral at bedtime  carvedilol 6.25 milliGRAM(s) Oral every 12 hours  ertapenem  IVPB 1000 milliGRAM(s) IV Intermittent every 24 hours  famotidine    Tablet 20 milliGRAM(s) Oral two times a day  fluticasone propionate/ salmeterol 100-50 MICROgram(s) Diskus 1 Dose(s) Inhalation two times a day  furosemide    Tablet 40 milliGRAM(s) Oral daily  hydrALAZINE 50 milliGRAM(s) Oral every 8 hours  insulin glargine Injectable (LANTUS) 16 Unit(s) SubCutaneous at bedtime  insulin lispro (ADMELOG) corrective regimen sliding scale   SubCutaneous three times a day before meals  insulin lispro (ADMELOG) corrective regimen sliding scale   SubCutaneous at bedtime  insulin lispro Injectable (ADMELOG) 4 Unit(s) SubCutaneous three times a day before meals  lactated ringers. 1000 milliLiter(s) (70 mL/Hr) IV Continuous <Continuous>  metFORMIN 500 milliGRAM(s) Oral two times a day  montelukast 10 milliGRAM(s) Oral daily  tiotropium 2.5 MICROgram(s) Inhaler 2 Puff(s) Inhalation daily    MEDICATIONS  (PRN):  acetaminophen     Tablet .. 650 milliGRAM(s) Oral every 6 hours PRN Temp greater or equal to 38C (100.4F), Mild Pain (1 - 3)  albuterol    90 MICROgram(s) HFA Inhaler 2 Puff(s) Inhalation every 6 hours PRN for bronchospasm  aluminum hydroxide/magnesium hydroxide/simethicone Suspension 30 milliLiter(s) Oral every 4 hours PRN Dyspepsia  melatonin 3 milliGRAM(s) Oral at bedtime PRN Insomnia  ondansetron Injectable 4 milliGRAM(s) IV Push every 8 hours PRN Nausea and/or Vomiting      REVIEW OF SYSTEMS:  CONSTITUTIONAL: No fever, weight loss, or fatigue  RESPIRATORY: No shortness of breath  CARDIOVASCULAR: No chest pain  GASTROINTESTINAL: No abdominal pain  GENITOURINARY: No dysuria  NEUROLOGICAL: No headaches  SKIN: No itching, burning, rashes    Vital Signs Last 24 Hrs  T(C): 36.9 (22 Mar 2025 04:58), Max: 36.9 (21 Mar 2025 13:36)  T(F): 98.4 (22 Mar 2025 04:58), Max: 98.4 (21 Mar 2025 13:36)  HR: 80 (22 Mar 2025 04:58) (68 - 85)  BP: 140/87 (22 Mar 2025 04:58) (124/96 - 145/93)  BP(mean): --  RR: 18 (22 Mar 2025 04:58) (16 - 18)  SpO2: 100% (22 Mar 2025 04:58) (95% - 100%)    Parameters below as of 22 Mar 2025 04:58  Patient On (Oxygen Delivery Method): room air    PHYSICAL EXAMINATION:  GENERAL: NAD, elderly woman confused laying in bed  HEAD:  Atraumatic, Normocephalic  EYES:  conjunctiva and sclera clear, + strabismus  CHEST/LUNG: Clear to auscultation. No rales, rhonchi, wheezing, or rubs  HEART: Irregular rate and rhythm; No murmurs, rubs, or gallops  ABDOMEN: Soft, Nontender, Nondistended; Bowel sounds present, + Protuberant abdomen  NERVOUS SYSTEM:  Alert & Oriented X2  EXTREMITIES:  2+ Peripheral Pulses, No clubbing, cyanosis, or edema, decreased strength LEs 3/5  SKIN: warm dry                          14.6   9.07  )-----------( 144      ( 22 Mar 2025 05:45 )             41.5     03-22    133[L]  |  94[L]  |  21[H]  ----------------------------<  348[H]  3.9   |  35[H]  |  0.85    Ca    8.5      22 Mar 2025 05:45  Phos  2.5     03-22  Mg     2.0     03-22    TPro  5.7[L]  /  Alb  2.8[L]  /  TBili  0.8  /  DBili  x   /  AST  12  /  ALT  28  /  AlkPhos  112  03-22    LIVER FUNCTIONS - ( 22 Mar 2025 05:45 )  Alb: 2.8 g/dL / Pro: 5.7 g/dL / ALK PHOS: 112 U/L / ALT: 28 U/L DA / AST: 12 U/L / GGT: x

## 2025-03-23 LAB
ALBUMIN SERPL ELPH-MCNC: 2.6 G/DL — LOW (ref 3.5–5)
ALP SERPL-CCNC: 104 U/L — SIGNIFICANT CHANGE UP (ref 40–120)
ALT FLD-CCNC: 28 U/L DA — SIGNIFICANT CHANGE UP (ref 10–60)
ANION GAP SERPL CALC-SCNC: 4 MMOL/L — LOW (ref 5–17)
AST SERPL-CCNC: 20 U/L — SIGNIFICANT CHANGE UP (ref 10–40)
BASOPHILS # BLD AUTO: 0.02 K/UL — SIGNIFICANT CHANGE UP (ref 0–0.2)
BASOPHILS NFR BLD AUTO: 0.2 % — SIGNIFICANT CHANGE UP (ref 0–2)
BILIRUB SERPL-MCNC: 0.9 MG/DL — SIGNIFICANT CHANGE UP (ref 0.2–1.2)
BUN SERPL-MCNC: 20 MG/DL — HIGH (ref 7–18)
CALCIUM SERPL-MCNC: 8.2 MG/DL — LOW (ref 8.4–10.5)
CHLORIDE SERPL-SCNC: 97 MMOL/L — SIGNIFICANT CHANGE UP (ref 96–108)
CO2 SERPL-SCNC: 32 MMOL/L — HIGH (ref 22–31)
CREAT SERPL-MCNC: 0.8 MG/DL — SIGNIFICANT CHANGE UP (ref 0.5–1.3)
EGFR: 69 ML/MIN/1.73M2 — SIGNIFICANT CHANGE UP
EGFR: 69 ML/MIN/1.73M2 — SIGNIFICANT CHANGE UP
EOSINOPHIL # BLD AUTO: 0.04 K/UL — SIGNIFICANT CHANGE UP (ref 0–0.5)
EOSINOPHIL NFR BLD AUTO: 0.5 % — SIGNIFICANT CHANGE UP (ref 0–6)
GLUCOSE BLDC GLUCOMTR-MCNC: 127 MG/DL — HIGH (ref 70–99)
GLUCOSE BLDC GLUCOMTR-MCNC: 84 MG/DL — SIGNIFICANT CHANGE UP (ref 70–99)
GLUCOSE BLDC GLUCOMTR-MCNC: 89 MG/DL — SIGNIFICANT CHANGE UP (ref 70–99)
GLUCOSE BLDC GLUCOMTR-MCNC: 90 MG/DL — SIGNIFICANT CHANGE UP (ref 70–99)
GLUCOSE SERPL-MCNC: 100 MG/DL — HIGH (ref 70–99)
HCT VFR BLD CALC: 44.7 % — SIGNIFICANT CHANGE UP (ref 34.5–45)
HGB BLD-MCNC: 15.5 G/DL — SIGNIFICANT CHANGE UP (ref 11.5–15.5)
IMM GRANULOCYTES NFR BLD AUTO: 1 % — HIGH (ref 0–0.9)
LYMPHOCYTES # BLD AUTO: 2.15 K/UL — SIGNIFICANT CHANGE UP (ref 1–3.3)
LYMPHOCYTES # BLD AUTO: 26.5 % — SIGNIFICANT CHANGE UP (ref 13–44)
MAGNESIUM SERPL-MCNC: 1.8 MG/DL — SIGNIFICANT CHANGE UP (ref 1.6–2.6)
MCHC RBC-ENTMCNC: 31.4 PG — SIGNIFICANT CHANGE UP (ref 27–34)
MCHC RBC-ENTMCNC: 34.7 G/DL — SIGNIFICANT CHANGE UP (ref 32–36)
MCV RBC AUTO: 90.7 FL — SIGNIFICANT CHANGE UP (ref 80–100)
MONOCYTES # BLD AUTO: 0.21 K/UL — SIGNIFICANT CHANGE UP (ref 0–0.9)
MONOCYTES NFR BLD AUTO: 2.6 % — SIGNIFICANT CHANGE UP (ref 2–14)
NEUTROPHILS # BLD AUTO: 5.6 K/UL — SIGNIFICANT CHANGE UP (ref 1.8–7.4)
NEUTROPHILS NFR BLD AUTO: 69.2 % — SIGNIFICANT CHANGE UP (ref 43–77)
NRBC BLD AUTO-RTO: 0 /100 WBCS — SIGNIFICANT CHANGE UP (ref 0–0)
PHOSPHATE SERPL-MCNC: 2.2 MG/DL — LOW (ref 2.5–4.5)
PLATELET # BLD AUTO: 142 K/UL — LOW (ref 150–400)
POTASSIUM SERPL-MCNC: 3.3 MMOL/L — LOW (ref 3.5–5.3)
POTASSIUM SERPL-SCNC: 3.3 MMOL/L — LOW (ref 3.5–5.3)
PROT SERPL-MCNC: 5.9 G/DL — LOW (ref 6–8.3)
RBC # BLD: 4.93 M/UL — SIGNIFICANT CHANGE UP (ref 3.8–5.2)
RBC # FLD: 13.2 % — SIGNIFICANT CHANGE UP (ref 10.3–14.5)
SODIUM SERPL-SCNC: 133 MMOL/L — LOW (ref 135–145)
WBC # BLD: 8.1 K/UL — SIGNIFICANT CHANGE UP (ref 3.8–10.5)
WBC # FLD AUTO: 8.1 K/UL — SIGNIFICANT CHANGE UP (ref 3.8–10.5)

## 2025-03-23 PROCEDURE — 99222 1ST HOSP IP/OBS MODERATE 55: CPT

## 2025-03-23 RX ORDER — INSULIN GLARGINE-YFGN 100 [IU]/ML
12 INJECTION, SOLUTION SUBCUTANEOUS AT BEDTIME
Refills: 0 | Status: DISCONTINUED | OUTPATIENT
Start: 2025-03-23 | End: 2025-03-24

## 2025-03-23 RX ADMIN — Medication 1 DOSE(S): at 21:54

## 2025-03-23 RX ADMIN — ERTAPENEM SODIUM 120 MILLIGRAM(S): 1 INJECTION, POWDER, LYOPHILIZED, FOR SOLUTION INTRAMUSCULAR; INTRAVENOUS at 17:55

## 2025-03-23 RX ADMIN — Medication 1 DOSE(S): at 09:10

## 2025-03-23 RX ADMIN — INSULIN LISPRO 4 UNIT(S): 100 INJECTION, SOLUTION INTRAVENOUS; SUBCUTANEOUS at 08:28

## 2025-03-23 RX ADMIN — APIXABAN 5 MILLIGRAM(S): 2.5 TABLET, FILM COATED ORAL at 17:55

## 2025-03-23 RX ADMIN — METFORMIN HYDROCHLORIDE 500 MILLIGRAM(S): 850 TABLET ORAL at 05:28

## 2025-03-23 RX ADMIN — INSULIN LISPRO 4 UNIT(S): 100 INJECTION, SOLUTION INTRAVENOUS; SUBCUTANEOUS at 17:23

## 2025-03-23 RX ADMIN — ATORVASTATIN CALCIUM 10 MILLIGRAM(S): 80 TABLET, FILM COATED ORAL at 21:54

## 2025-03-23 RX ADMIN — Medication 20 MILLIGRAM(S): at 05:28

## 2025-03-23 RX ADMIN — INSULIN LISPRO 4 UNIT(S): 100 INJECTION, SOLUTION INTRAVENOUS; SUBCUTANEOUS at 11:47

## 2025-03-23 RX ADMIN — Medication 50 MILLIGRAM(S): at 21:54

## 2025-03-23 RX ADMIN — APIXABAN 5 MILLIGRAM(S): 2.5 TABLET, FILM COATED ORAL at 05:28

## 2025-03-23 RX ADMIN — CARVEDILOL 6.25 MILLIGRAM(S): 3.12 TABLET, FILM COATED ORAL at 05:28

## 2025-03-23 RX ADMIN — TIOTROPIUM BROMIDE INHALATION SPRAY 2 PUFF(S): 3.12 SPRAY, METERED RESPIRATORY (INHALATION) at 11:43

## 2025-03-23 RX ADMIN — Medication 650 MILLIGRAM(S): at 08:32

## 2025-03-23 RX ADMIN — MONTELUKAST SODIUM 10 MILLIGRAM(S): 10 TABLET ORAL at 11:43

## 2025-03-23 RX ADMIN — Medication 20 MILLIEQUIVALENT(S): at 11:43

## 2025-03-23 RX ADMIN — Medication 20 MILLIGRAM(S): at 17:55

## 2025-03-23 RX ADMIN — METFORMIN HYDROCHLORIDE 500 MILLIGRAM(S): 850 TABLET ORAL at 17:54

## 2025-03-23 RX ADMIN — FUROSEMIDE 40 MILLIGRAM(S): 10 INJECTION INTRAMUSCULAR; INTRAVENOUS at 05:28

## 2025-03-23 RX ADMIN — Medication 650 MILLIGRAM(S): at 09:32

## 2025-03-23 RX ADMIN — ANASTROZOLE 1 MILLIGRAM(S): 1 TABLET ORAL at 11:43

## 2025-03-23 NOTE — PROGRESS NOTE ADULT - PROBLEM SELECTOR PLAN 1
BG>500 in ED  hx of predm  s/p 8 u regular insulin and IVF  started ISS  NPO until BG<200 then start diet but no chicken,turkey or eggs (no poultry)  started 16 units glargine, 4 premeals, and metformin 500BID  A1c= 10.6    Endo consulted Dr. Funk

## 2025-03-23 NOTE — PROGRESS NOTE ADULT - SUBJECTIVE AND OBJECTIVE BOX
Interval Events:  pt in nad    Allergies    losartan (Angioedema)    Intolerances    Chicken (Stomach Upset)    Endocrine/Metabolic Medications:  atorvastatin 10 milliGRAM(s) Oral at bedtime  insulin glargine Injectable (LANTUS) 16 Unit(s) SubCutaneous at bedtime  insulin lispro (ADMELOG) corrective regimen sliding scale   SubCutaneous three times a day before meals  insulin lispro (ADMELOG) corrective regimen sliding scale   SubCutaneous at bedtime  insulin lispro Injectable (ADMELOG) 4 Unit(s) SubCutaneous three times a day before meals  metFORMIN 500 milliGRAM(s) Oral two times a day      Vital Signs Last 24 Hrs  T(C): 36.3 (23 Mar 2025 13:45), Max: 36.7 (22 Mar 2025 22:00)  T(F): 97.3 (23 Mar 2025 13:45), Max: 98.1 (22 Mar 2025 22:00)  HR: 78 (23 Mar 2025 17:15) (78 - 103)  BP: 105/70 (23 Mar 2025 17:15) (93/65 - 111/89)  BP(mean): 98 (23 Mar 2025 13:45) (73 - 98)  RR: 18 (23 Mar 2025 13:45) (18 - 18)  SpO2: 98% (23 Mar 2025 13:45) (96% - 100%)    Parameters below as of 23 Mar 2025 13:45  Patient On (Oxygen Delivery Method): room air          PHYSICAL EXAM  All physical exam findings normal, except those marked:  General:	Alert, active, cooperative, NAD, well hydrated  .		[] Abnormal:  Neck		Normal: supple, no cervical adenopathy, no palpable thyroid  .		[] Abnormal:  Cardiovascular	Normal: regular rate, normal S1, S2, no murmurs  .		[] Abnormal:  Respiratory	Normal: no chest wall deformity, normal respiratory pattern, CTA B/L  .		[] Abnormal:  Abdominal	Normal: soft, ND, NT, bowel sounds present, no masses, no organomegaly  .		[] Abnormal:  		Normal normal genitalia, testes descended, circumcised/uncircumcised  .		Amy stage:			Breast amy:  .		Menstrual history:  .		[] Abnormal:  Extremities	Normal: FROM x4  .		[] Abnormal:  Skin		Normal: intact and not indurated, no rash, no acanthosis nigricans  .		[] Abnormal:  Neurologic	Normal: grossly intact  .		[] Abnormal:    LABS                        15.5   8.10  )-----------( 142      ( 23 Mar 2025 06:50 )             44.7                               133    |  97     |  20                  Calcium: 8.2   / iCa: x      (03-23 @ 06:50)    ----------------------------<  100       Magnesium: 1.8                              3.3     |  32     |  0.80             Phosphorous: 2.2      TPro  5.9    /  Alb  2.6    /  TBili  0.9    /  DBili  x      /  AST  20     /  ALT  28     /  AlkPhos  104    23 Mar 2025 06:50    CAPILLARY BLOOD GLUCOSE      POCT Blood Glucose.: 127 mg/dL (23 Mar 2025 16:40)  POCT Blood Glucose.: 84 mg/dL (23 Mar 2025 11:28)  POCT Blood Glucose.: 90 mg/dL (23 Mar 2025 07:46)        Assesment/plan    89 yo F, w/ morbid obesity, is legally blind ambulates with walker, with PMH of HTN, HLD, COPD (never smoker, on 2L O2 at home), BIPAP at night , HFpEF (EF 55-60%), severe RV, Afib on Eliquis, Pulmonary HTN, frequent UTI's presents from her doctors office with hyperglycemia.   Endocrine consulted for dm management. Pt has hx of pre dm - on no meds.          Problem/Recommendation - 1:  ·  Problem: Hyperglycemia.   ·  Recommendation: new onset dm with severe hyperglycemia  now toghtly controlled   dec lantus to 12 units  hold admelog 4 ac tid  cont metformin   dm teaching to family- pt legally blind  consider lantus and oral dm meds ?  fsg ac and hs.

## 2025-03-23 NOTE — PROGRESS NOTE ADULT - SUBJECTIVE AND OBJECTIVE BOX
Patient was seen and examined  Patient is a 89y old  Female who presents with a chief complaint of Hyperglycemia, UTI (22 Mar 2025 13:59)      INTERVAL HPI/OVERNIGHT EVENTS:  T(C): 36.4 (03-23-25 @ 05:42), Max: 36.8 (03-22-25 @ 14:20)  HR: 103 (03-23-25 @ 05:42) (78 - 103)  BP: 105/81 (03-23-25 @ 05:42) (93/65 - 109/78)  RR: 18 (03-23-25 @ 05:42) (18 - 18)  SpO2: 98% (03-23-25 @ 05:42) (96% - 100%)  Wt(kg): --  I&O's Summary    22 Mar 2025 07:01  -  23 Mar 2025 07:00  --------------------------------------------------------  IN: 0 mL / OUT: 1300 mL / NET: -1300 mL        LABS:                        15.5   8.10  )-----------( 142      ( 23 Mar 2025 06:50 )             44.7     03-23    133[L]  |  97  |  20[H]  ----------------------------<  100[H]  3.3[L]   |  32[H]  |  0.80    Ca    8.2[L]      23 Mar 2025 06:50  Phos  2.2     03-23  Mg     1.8     03-23    TPro  5.9[L]  /  Alb  2.6[L]  /  TBili  0.9  /  DBili  x   /  AST  20  /  ALT  28  /  AlkPhos  104  03-23      Urinalysis Basic - ( 23 Mar 2025 06:50 )    Color: x / Appearance: x / SG: x / pH: x  Gluc: 100 mg/dL / Ketone: x  / Bili: x / Urobili: x   Blood: x / Protein: x / Nitrite: x   Leuk Esterase: x / RBC: x / WBC x   Sq Epi: x / Non Sq Epi: x / Bacteria: x      CAPILLARY BLOOD GLUCOSE      POCT Blood Glucose.: 90 mg/dL (23 Mar 2025 07:46)  POCT Blood Glucose.: 172 mg/dL (22 Mar 2025 16:55)  POCT Blood Glucose.: 218 mg/dL (22 Mar 2025 12:30)          Urinalysis Basic - ( 23 Mar 2025 06:50 )    Color: x / Appearance: x / SG: x / pH: x  Gluc: 100 mg/dL / Ketone: x  / Bili: x / Urobili: x   Blood: x / Protein: x / Nitrite: x   Leuk Esterase: x / RBC: x / WBC x   Sq Epi: x / Non Sq Epi: x / Bacteria: x        MEDICATIONS  (STANDING):  anastrozole 1 milliGRAM(s) Oral daily  apixaban 5 milliGRAM(s) Oral every 12 hours  atorvastatin 10 milliGRAM(s) Oral at bedtime  carvedilol 6.25 milliGRAM(s) Oral every 12 hours  ertapenem  IVPB 1000 milliGRAM(s) IV Intermittent every 24 hours  famotidine    Tablet 20 milliGRAM(s) Oral two times a day  fluticasone propionate/ salmeterol 100-50 MICROgram(s) Diskus 1 Dose(s) Inhalation two times a day  furosemide    Tablet 40 milliGRAM(s) Oral daily  hydrALAZINE 50 milliGRAM(s) Oral every 8 hours  insulin glargine Injectable (LANTUS) 16 Unit(s) SubCutaneous at bedtime  insulin lispro (ADMELOG) corrective regimen sliding scale   SubCutaneous three times a day before meals  insulin lispro (ADMELOG) corrective regimen sliding scale   SubCutaneous at bedtime  insulin lispro Injectable (ADMELOG) 4 Unit(s) SubCutaneous three times a day before meals  lactated ringers. 1000 milliLiter(s) (70 mL/Hr) IV Continuous <Continuous>  metFORMIN 500 milliGRAM(s) Oral two times a day  montelukast 10 milliGRAM(s) Oral daily  tiotropium 2.5 MICROgram(s) Inhaler 2 Puff(s) Inhalation daily    MEDICATIONS  (PRN):  acetaminophen     Tablet .. 650 milliGRAM(s) Oral every 6 hours PRN Temp greater or equal to 38C (100.4F), Mild Pain (1 - 3)  albuterol    90 MICROgram(s) HFA Inhaler 2 Puff(s) Inhalation every 6 hours PRN for bronchospasm  aluminum hydroxide/magnesium hydroxide/simethicone Suspension 30 milliLiter(s) Oral every 4 hours PRN Dyspepsia  melatonin 3 milliGRAM(s) Oral at bedtime PRN Insomnia  ondansetron Injectable 4 milliGRAM(s) IV Push every 8 hours PRN Nausea and/or Vomiting      RADIOLOGY & ADDITIONAL TESTS:    Imaging Personally Reviewed:  [ ] YES  [ ] NO    REVIEW OF SYSTEMS:  poor historian    Consultant(s) Notes Reviewed:  [ ] YES  [ ] NO    PHYSICAL EXAM:  GENERAL: NAD, well-groomed, well-developed  HEAD:  Atraumatic, Normocephalic  EYES: EOMI, PERRLA, conjunctiva and sclera clear  ENMT: No tonsillar erythema, exudates, or enlargement; Moist mucous membranes, Good dentition, No lesions  NECK: Supple, No JVD, Normal thyroid  NERVOUS SYSTEM:  awake   CHEST/LUNG: Clear to percussion bilaterally; No rales, rhonchi, wheezing, or rubs  HEART: Regular rate and rhythm; No murmurs, rubs, or gallops  ABDOMEN: Soft, Nontender, Nondistended; Bowel sounds present  EXTREMITIES:  2+ Peripheral Pulses, No clubbing, cyanosis, or edema  LYMPH: No lymphadenopathy noted  SKIN: No rashes or lesions    Care Discussed with Consultants/Other Providers [ x] YES  [ ] NO

## 2025-03-23 NOTE — PROGRESS NOTE ADULT - SUBJECTIVE AND OBJECTIVE BOX
Patient is seen and examined at the bed side, is afebrile. The urine culture from 3/21 growing Gram negative Rods, not finalized yet.        REVIEW OF SYSTEMS: All other review systems are negative        ALLERGIES: Chicken (Stomach Upset)  losartan (Angioedema)      Vital Signs Last 24 Hrs  T(C): 36.3 (23 Mar 2025 13:45), Max: 36.7 (22 Mar 2025 22:00)  T(F): 97.3 (23 Mar 2025 13:45), Max: 98.1 (22 Mar 2025 22:00)  HR: 78 (23 Mar 2025 17:15) (78 - 103)  BP: 105/70 (23 Mar 2025 17:15) (93/65 - 111/89)  BP(mean): 98 (23 Mar 2025 13:45) (73 - 98)  RR: 18 (23 Mar 2025 13:45) (18 - 18)  SpO2: 98% (23 Mar 2025 13:45) (96% - 100%)    Parameters below as of 23 Mar 2025 13:45  Patient On (Oxygen Delivery Method): room air        PHYSICAL EXAM:  GENERAL: Not in distress   CHEST/LUNG:  Air entry bilaterally  HEART: s1 and s2 present  ABDOMEN:  Nontender and  Nondistended  EXTREMITIES: No pedal  edema  CNS: Awake and Alert        LABS:                        15.5   8.10  )-----------( 142      ( 23 Mar 2025 06:50 )             44.7                           14.6   9.07  )-----------( 144      ( 22 Mar 2025 05:45 )             41.5         03-23    133[L]  |  97  |  20[H]  ----------------------------<  100[H]  3.3[L]   |  32[H]  |  0.80    Ca    8.2[L]      23 Mar 2025 06:50  Phos  2.2     03-23  Mg     1.8     03-23    TPro  5.9[L]  /  Alb  2.6[L]  /  TBili  0.9  /  DBili  x   /  AST  20  /  ALT  28  /  AlkPhos  104  03-23    03-22    133[L]  |  94[L]  |  21[H]  ----------------------------<  348[H]  3.9   |  35[H]  |  0.85    Ca    8.5      22 Mar 2025 05:45  Phos  2.5     03-22  Mg     2.0     03-22    TPro  5.7[L]  /  Alb  2.8[L]  /  TBili  0.8  /  DBili  x   /  AST  12  /  ALT  28  /  AlkPhos  112  03-22      CAPILLARY BLOOD GLUCOSE  POCT Blood Glucose.: 218 mg/dL (22 Mar 2025 12:30)  POCT Blood Glucose.: 318 mg/dL (22 Mar 2025 07:59)  POCT Blood Glucose.: 304 mg/dL (22 Mar 2025 05:39)  POCT Blood Glucose.: 370 mg/dL (21 Mar 2025 18:00)  POCT Blood Glucose.: 572 mg/dL (21 Mar 2025 14:19)        MEDICATIONS  (STANDING):    anastrozole 1 milliGRAM(s) Oral daily  apixaban 5 milliGRAM(s) Oral every 12 hours  atorvastatin 10 milliGRAM(s) Oral at bedtime  carvedilol 6.25 milliGRAM(s) Oral every 12 hours  ertapenem  IVPB 1000 milliGRAM(s) IV Intermittent every 24 hours  famotidine    Tablet 20 milliGRAM(s) Oral two times a day  fluticasone propionate/ salmeterol 100-50 MICROgram(s) Diskus 1 Dose(s) Inhalation two times a day  furosemide    Tablet 40 milliGRAM(s) Oral daily  hydrALAZINE 50 milliGRAM(s) Oral every 8 hours  insulin glargine Injectable (LANTUS) 16 Unit(s) SubCutaneous at bedtime  insulin lispro (ADMELOG) corrective regimen sliding scale   SubCutaneous three times a day before meals  insulin lispro (ADMELOG) corrective regimen sliding scale   SubCutaneous at bedtime  insulin lispro Injectable (ADMELOG) 4 Unit(s) SubCutaneous three times a day before meals  lactated ringers. 1000 milliLiter(s) (70 mL/Hr) IV Continuous <Continuous>  metFORMIN 500 milliGRAM(s) Oral two times a day  montelukast 10 milliGRAM(s) Oral daily  tiotropium 2.5 MICROgram(s) Inhaler 2 Puff(s) Inhalation daily        RADIOLOGY & ADDITIONAL TESTS:    3/21/25: Xray Chest 1 View-PORTABLE IMMEDIATE (Xray Chest 1 View-PORTABLE IMMEDIATE .) (03.21.25 @ 17:11) IMPRESSION: No acute cardiopulmonary disease process. Cardiomegaly.      MICROBIOLOGY DATA:    Culture - Urine (03.21.25 @ 15:15)   Specimen Source: Catheterized Catheterized  Culture Results:   >100,000 CFU/ml Gram Negative Rods    Urine Microscopic-Add On (NC) (03.21.25 @ 15:15)   White Blood Cell - Urine: >100 /HPF  Red Blood Cell - Urine: 4 /HPF  Bacteria: Few /HPF  Squamous Epithelial Cells: Present: FEW  Comment - Urine: Occasional budding yeasts present

## 2025-03-23 NOTE — CONSULT NOTE ADULT - SUBJECTIVE AND OBJECTIVE BOX
BIBEMS from physiscian's office 3/21/25.  History from Admission H&P    <Start of quote(s) from H&P>  "Reason for Admission: Hyperglycemia, UTI  History of Present Illness:   89 yo F, w/ morbid obesity, is legally blind ambulates with walker, with PMH of HTN, HLD, COPD (never smoker, on 2L O2 at home), BIPAP at night , HFpEF (EF 55-60%), severe RV, Afib on Eliquis, Pulmonary HTN, frequent UTI's presents from her doctors office with hyperglycemia. Collateral obtained from granddaughter elder duong [phone]. Patient has a history of pre DM and her a1c on her usual visits has always been in the 5's [Neurology Consult Attending comment: in her [facility] EMR there are several A1C values between 6.5% and 6.8% from 2014 to 2020, two values of 7.0% and 7.6% from 2021 to 2023.  Yesterday, after arrival here it was 10.6%] and she didnt require any medication. She was not complaining of anything specifically but at the doctors appointment, the high blood sugar and general appearance prompted them to send the patient to the ED. Patient denies any fever, chills, dizziness ,headache, SOB, cough, chest pain, palpitations, nausea, vomiting, diarrhea, abdominal pain, urinary symptoms, lower extremity pain, or edema. Patient denies recent travel or sick contacts.      Review of Systems:  Other Review of Systems: All other review of systems negative, except as noted in HPI   . . .     Home Medications:   * Patient Currently Takes Medications as of 21-Mar-2025 18:36 documented in Structured Notes  · 	hydrALAZINE 50 mg oral tablet: Last Dose Taken:  , 1 tab(s) orally every 8 hours  · 	atorvastatin 10 mg oral tablet: Last Dose Taken:  , 1 tab(s) orally once a day (at bedtime)  · 	albuterol 90 mcg/inh inhalation aerosol: Last Dose Taken:  , 2 puff(s) inhaled every 6 hours as needed for  bronchospasm  · 	Trelegy Ellipta 100 mcg-62.5 mcg-25 mcg/inh inhalation powder: Last Dose Taken:  , 1 puff(s) inhaled once a day  · 	famotidine 20 mg oral tablet: Last Dose Taken:  , 1 tab(s) orally 2 times a day  · 	Eliquis 5 mg oral tablet: Last Dose Taken:  , 1 tab(s) orally 2 times a day  · 	Singulair 10 mg oral tablet: Last Dose Taken:  , 1 tab(s) orally once a day  · 	anastrozole 1 mg oral tablet: Last Dose Taken:  , 1 tab(s) orally once a day  · 	carvedilol 6.25 mg oral tablet: Last Dose Taken:  , 1 tab(s) orally 2 times a day  · 	Lasix 40 mg oral tablet: Last Dose Taken:  , 1 tab(s) orally once a day   . . .     PAST MEDICAL HISTORY:  Arthritis   Atrial fibrillation   Breast cancer right, no chemo or radiation  CHF, acute   COPD exacerbation   COPD exacerbation   Deep vein thrombosis (DVT) Left Lower Extremity  H/O CHF   HF (heart failure) HFpEF 7/29  HLD (hyperlipidemia)   HTN (hypertension)   HTN (hypertension)   Legally blind   Pre-diabetes.     PAST SURGICAL HISTORY:  No significant past surgical history.    . . .     · Substance use	No   . . .     ·  Problem: Hyperglycemia.   ·  Plan: BG>500 in ED  hx of predm [Neurology Consult Attending comment: based on all the available Hgb A1c values the patient has been diabetic, not pre-diabetic, since at least as far back as 2014]  s/p 8 u regular insulin and IVF  started ISS   . . .      Problem/Plan - 2:  ·  Problem: Acute UTI.   ·  Plan: history of UTI in past  not complaining of specific symptoms  UA +ve and hyperglycemic, general malaise- will treat with ertapenem"  <End of quote(s) from H&P>     BIBEMS from physiscian's office 3/21/25.  History from Admission H&P    <Start of quote(s) from H&P>  "Reason for Admission: Hyperglycemia, UTI  History of Present Illness:   89 yo F, w/ morbid obesity, is legally blind ambulates with walker, with PMH of HTN, HLD, COPD (never smoker, on 2L O2 at home), BIPAP at night , HFpEF (EF 55-60%), severe RV, Afib on Eliquis, Pulmonary HTN, frequent UTI's presents from her doctors office with hyperglycemia. Collateral obtained from granddaughter elder duong [phone]. Patient has a history of pre DM and her a1c on her usual visits has always been in the 5's [Neurology Consult Attending comment: in her [facility] EMR there are several A1C values between 6.5% and 6.8% from 2014 to 2020, two values of 7.0% and 7.6% from 2021 to 2023.  Yesterday, after arrival here it was 10.6%] and she didnt require any medication. She was not complaining of anything specifically but at the doctors appointment, the high blood sugar and general appearance prompted them to send the patient to the ED. Patient denies any fever, chills, dizziness ,headache, SOB, cough, chest pain, palpitations, nausea, vomiting, diarrhea, abdominal pain, urinary symptoms, lower extremity pain, or edema. Patient denies recent travel or sick contacts.      Review of Systems:  Other Review of Systems: All other review of systems negative, except as noted in HPI   . . .     Home Medications:   * Patient Currently Takes Medications as of 21-Mar-2025 18:36 documented in Structured Notes  · 	hydrALAZINE 50 mg oral tablet: Last Dose Taken:  , 1 tab(s) orally every 8 hours  · 	atorvastatin 10 mg oral tablet: Last Dose Taken:  , 1 tab(s) orally once a day (at bedtime)  · 	albuterol 90 mcg/inh inhalation aerosol: Last Dose Taken:  , 2 puff(s) inhaled every 6 hours as needed for  bronchospasm  · 	Trelegy Ellipta 100 mcg-62.5 mcg-25 mcg/inh inhalation powder: Last Dose Taken:  , 1 puff(s) inhaled once a day  · 	famotidine 20 mg oral tablet: Last Dose Taken:  , 1 tab(s) orally 2 times a day  · 	Eliquis 5 mg oral tablet: Last Dose Taken:  , 1 tab(s) orally 2 times a day  · 	Singulair 10 mg oral tablet: Last Dose Taken:  , 1 tab(s) orally once a day  · 	anastrozole 1 mg oral tablet: Last Dose Taken:  , 1 tab(s) orally once a day  · 	carvedilol 6.25 mg oral tablet: Last Dose Taken:  , 1 tab(s) orally 2 times a day  · 	Lasix 40 mg oral tablet: Last Dose Taken:  , 1 tab(s) orally once a day   . . .     PAST MEDICAL HISTORY:  Arthritis   Atrial fibrillation   Breast cancer right, no chemo or radiation  CHF, acute   COPD exacerbation   COPD exacerbation   Deep vein thrombosis (DVT) Left Lower Extremity  H/O CHF   HF (heart failure) HFpEF 7/29  HLD (hyperlipidemia)   HTN (hypertension)   HTN (hypertension)   Legally blind   Pre-diabetes.     PAST SURGICAL HISTORY:  No significant past surgical history.    . . .     · Substance use	No   . . .     ·  Problem: Hyperglycemia.   ·  Plan: BG>500 in ED  hx of predm [Neurology Consult Attending comment: based on all the available Hgb A1c values the patient has been diabetic, not pre-diabetic, since at least as far back as 2014]  s/p 8 u regular insulin and IVF  started ISS   . . .      Problem/Plan - 2:  ·  Problem: Acute UTI.   ·  Plan: history of UTI in past  not complaining of specific symptoms  UA +ve and hyperglycemic, general malaise- will treat with ertapenem"  <End of quote(s) from H&P>    Per H&P 10/21/17 Pt was already " mostly bedbound. Ambulates rarely with walker due to chronic knee pain."    25-OH vitamin D level was 23.2 on 10/15/22.  TSH, B12, folic acid levels were WNL years ago.  Tot prot/alb  5.9/2.6; since 2014 rarely has albumin been >=3.0.

## 2025-03-23 NOTE — CONSULT NOTE ADULT - CONSULT REQUESTED DATE/TIME
23-Mar-2025 09:00
22-Mar-2025 13:59
22-Mar-2025 13:26
22-Mar-2025 10:17
Cheek Interpolation Flap Text: A decision was made to reconstruct the defect utilizing an interpolation axial flap and a staged reconstruction.  A telfa template was made of the defect.  This telfa template was then used to outline the Cheek Interpolation flap.  The donor area for the pedicle flap was then injected with anesthesia.  The flap was excised through the skin and subcutaneous tissue down to the layer of the underlying musculature.  The interpolation flap was carefully excised within this deep plane to maintain its blood supply.  The edges of the donor site were undermined.   The donor site was closed in a primary fashion.  The pedicle was then rotated into position and sutured.  Once the tube was sutured into place, adequate blood supply was confirmed with blanching and refill.  The pedicle was then wrapped with xeroform gauze and dressed appropriately with a telfa and gauze bandage to ensure continued blood supply and protect the attached pedicle.

## 2025-03-23 NOTE — PROGRESS NOTE ADULT - SUBJECTIVE AND OBJECTIVE BOX
Date of Service 03-23-25 @ 10:43    CHIEF COMPLAINT:Patient is a 89y old  Female who presents with a chief complaint of Hyperglycemia, UTI .Pt appears comfortable.    	  REVIEW OF SYSTEMS:  CONSTITUTIONAL: No fever, weight loss, or fatigue  EYES: No eye pain, visual disturbances, or discharge  ENT:  No difficulty hearing, tinnitus, vertigo; No sinus or throat pain  NECK: No pain or stiffness  RESPIRATORY: No cough, wheezing, chills or hemoptysis; No Shortness of Breath  CARDIOVASCULAR: No chest pain, palpitations, passing out, dizziness, or leg swelling  GASTROINTESTINAL: No abdominal or epigastric pain. No nausea, vomiting, or hematemesis; No diarrhea or constipation. No melena or hematochezia.  GENITOURINARY: No dysuria, frequency, hematuria, or incontinence  NEUROLOGICAL: No headaches, memory loss, loss of strength, numbness, or tremors  SKIN: No itching, burning, rashes, or lesions   LYMPH Nodes: No enlarged glands  ENDOCRINE: No heat or cold intolerance; No hair loss  MUSCULOSKELETAL: No joint pain or swelling; No muscle, back, or extremity pain  PSYCHIATRIC: No depression, anxiety, mood swings, or difficulty sleeping  HEME/LYMPH: No easy bruising, or bleeding gums  ALLERGY AND IMMUNOLOGIC: No hives or eczema	      PHYSICAL EXAM:  T(C): 36.4 (03-23-25 @ 05:42), Max: 36.8 (03-22-25 @ 14:20)  HR: 103 (03-23-25 @ 05:42) (78 - 103)  BP: 105/81 (03-23-25 @ 05:42) (93/65 - 109/78)  RR: 18 (03-23-25 @ 05:42) (18 - 18)  SpO2: 98% (03-23-25 @ 05:42) (96% - 100%)  Wt(kg): --  I&O's Summary    22 Mar 2025 07:01  -  23 Mar 2025 07:00  --------------------------------------------------------  IN: 0 mL / OUT: 1300 mL / NET: -1300 mL        Appearance: Normal	  HEENT:   Normal oral mucosa, PERRL, EOMI	  Lymphatic: No lymphadenopathy  Cardiovascular: Normal S1 S2, No JVD, No murmurs, No edema  Respiratory: Lungs clear to auscultation	  Psychiatry: A & O x 3, Mood & affect appropriate  Gastrointestinal:  Soft, Non-tender, + BS	  Skin: No rashes, No ecchymoses, No cyanosis	  Neurologic: Non-focal  Extremities: Normal range of motion, No clubbing, cyanosis or edema  Vascular: Peripheral pulses palpable 2+ bilaterally    MEDICATIONS  (STANDING):  anastrozole 1 milliGRAM(s) Oral daily  apixaban 5 milliGRAM(s) Oral every 12 hours  atorvastatin 10 milliGRAM(s) Oral at bedtime  carvedilol 6.25 milliGRAM(s) Oral every 12 hours  ertapenem  IVPB 1000 milliGRAM(s) IV Intermittent every 24 hours  famotidine    Tablet 20 milliGRAM(s) Oral two times a day  fluticasone propionate/ salmeterol 100-50 MICROgram(s) Diskus 1 Dose(s) Inhalation two times a day  furosemide    Tablet 40 milliGRAM(s) Oral daily  hydrALAZINE 50 milliGRAM(s) Oral every 8 hours  insulin glargine Injectable (LANTUS) 16 Unit(s) SubCutaneous at bedtime  insulin lispro (ADMELOG) corrective regimen sliding scale   SubCutaneous three times a day before meals  insulin lispro (ADMELOG) corrective regimen sliding scale   SubCutaneous at bedtime  insulin lispro Injectable (ADMELOG) 4 Unit(s) SubCutaneous three times a day before meals  lactated ringers. 1000 milliLiter(s) (70 mL/Hr) IV Continuous <Continuous>  metFORMIN 500 milliGRAM(s) Oral two times a day  montelukast 10 milliGRAM(s) Oral daily  potassium chloride    Tablet ER 20 milliEquivalent(s) Oral once  tiotropium 2.5 MICROgram(s) Inhaler 2 Puff(s) Inhalation daily        LABS:	 	      Troponin I, High Sensitivity Result: 72.3 ng/L (03-21 @ 18:00)  Troponin I, High Sensitivity Result: 77.0 ng/L (03-21 @ 15:37)                            15.5   8.10  )-----------( 142      ( 23 Mar 2025 06:50 )             44.7     03-23    133[L]  |  97  |  20[H]  ----------------------------<  100[H]  3.3[L]   |  32[H]  |  0.80    Ca    8.2[L]      23 Mar 2025 06:50  Phos  2.2     03-23  Mg     1.8     03-23    TPro  5.9[L]  /  Alb  2.6[L]  /  TBili  0.9  /  DBili  x   /  AST  20  /  ALT  28  /  AlkPhos  104  03-23

## 2025-03-23 NOTE — PROGRESS NOTE ADULT - ASSESSMENT
Patient is a 89y old  Female who is morbid obesity, is legally blind ambulates with walker, and PMH of HTN, HLD, COPD (never smoker, on 2L O2 at home), BIPAP at night , HFpEF (EF 55-60%), severe RV, Afib on Eliquis, Pulmonary HTN, frequent UTI's now sent in to the ER from her doctors office for evaluation of hyperglycemia. Collateral obtained from granddaughter Martin Churchill [phone]. Patient has a history of pre DM and her A1c on her usual visits has always been in the 5's and she didnot require any medication. She was not complaining of anything specifically but at the doctors appointment, the high blood sugar and general appearance prompted them to send the patient to the ER. On admission, she found to have no fever but positive Urine analysis. She has started on Ertapenem and the ID consult requested to assist with further evaluation and antibiotic management.     # UTI - h/o ESBL organism  # Hyperglycemia - better controlled     would recommend:    1. Follow up  final Urine culture, is growing GNR  2. Continue Ertapenem until culture is available  3. Management of Hyperglycemia as per Primary team      Attending Attestation:    Spent more than 45 minutes on total encounter, more than 50 % of the visit was spent counseling and/or coordinating care by the Attending physician.

## 2025-03-23 NOTE — CONSULT NOTE ADULT - ASSESSMENT
89 yo F, w/ morbid obesity, is legally blind ambulates with walker, with PMH of HTN, HLD, COPD (never smoker, on 2L O2 at home), BIPAP at night , HFpEF (EF 55-60%), severe RV, chronic Afib on Eliquis, Pulmonary HTN, frequent UTI's presents from her doctors office with hyperglycemia and UTI.  1.UTI-ABX.  2.Chronic afib-eliquis,coreg.  3.DM-Insulin,Endo eval.  4.HTN-cont bp medication.  5.COPD-MDI,singulair.  6.Lipid d/o-statin.  7.PPI.
Patient is a 89y old  Female who is morbid obesity, is legally blind ambulates with walker, and PMH of HTN, HLD, COPD (never smoker, on 2L O2 at home), BIPAP at night , HFpEF (EF 55-60%), severe RV, Afib on Eliquis, Pulmonary HTN, frequent UTI's now sent in to the ER from her doctors office for evaluation of hyperglycemia. Collateral obtained from granddaughter Martin Churchill [phone]. Patient has a history of pre DM and her A1c on her usual visits has always been in the 5's and she didnot require any medication. She was not complaining of anything specifically but at the doctors appointment, the high blood sugar and general appearance prompted them to send the patient to the ER. On admission, she found to have no fever but positive Urine analysis. She has started on Ertapenem and the ID consult requested to assist with further evaluation and antibiotic management.     # UTI - h/o ESBL organism  # Hyperglycemia     would recommend:    1. Follow up Urine culture  2. Continue Ertapenem until culture is available  3. Management of Hyperglycemia as per Primary team    will follow the patient with you and make further recommendation based on the clinical course and Lab results  Thank you for the opportunity to participate in Ms. Constantino's care    Attending Attestation:    Spent more than 65 minutes on total encounter, more than 50 % of the visit was spent counseling and/or coordinating care by the Attending physician.    
Uncontrolled DM.    Diabetic for at least 10 years.     Chronic malnutrition for at least 10 years.      Morbid obesity.    Vitamin D deficient per 2022 test result; not currently on supplementation    Legally blind.    Essentially bed-bound (limited mobility).          RECOMMENDATIONS    B12, SERUM folic acid (NOT RBC folic), methylmalonic acid + homocysteine, TSH, FT4, RPR, MEMO, 25-OH vitamin D, ESR, CRP, RF, CCP, vitamin B6 level.        Negin Brown M.D.   - Department of Neurology  [employer] [facility] [employer] [facility] [employer] [facility]    
89 yo F, w/ morbid obesity, is legally blind ambulates with walker, with PMH of HTN, HLD, COPD (never smoker, on 2L O2 at home), BIPAP at night , HFpEF (EF 55-60%), severe RV, Afib on Eliquis, Pulmonary HTN, frequent UTI's presents from her doctors office with hyperglycemia.   Endocrine consulted for dm management. Pt has hx of pre dm - on no meds.

## 2025-03-23 NOTE — PROGRESS NOTE ADULT - ASSESSMENT
89 yo F, w/ morbid obesity, is legally blind ambulates with walker, with PMH of HTN, HLD, COPD (never smoker, on 2L O2 at home), BIPAP at night , HFpEF (EF 55-60%), severe RV, chronic Afib on Eliquis, Pulmonary HTN, frequent UTI's presents from her doctors office with hyperglycemia and UTI.  1.UTI-ABX as per ID..  2.Chronic afib-eliquis,coreg.  3.DM-Insulin,Endo eval noted.  4.HTN-cont bp medication.  5.COPD-MDI,singulair.  6.Lipid d/o-statin.  7.Replace k+.  8.PPI.

## 2025-03-24 LAB
-  AMOXICILLIN/CLAVULANIC ACID: SIGNIFICANT CHANGE UP
-  AMPICILLIN/SULBACTAM: SIGNIFICANT CHANGE UP
-  AMPICILLIN: SIGNIFICANT CHANGE UP
-  AZTREONAM: SIGNIFICANT CHANGE UP
-  CEFAZOLIN: SIGNIFICANT CHANGE UP
-  CEFEPIME: SIGNIFICANT CHANGE UP
-  CEFOXITIN: SIGNIFICANT CHANGE UP
-  CEFTRIAXONE: SIGNIFICANT CHANGE UP
-  CEFUROXIME: SIGNIFICANT CHANGE UP
-  CIPROFLOXACIN: SIGNIFICANT CHANGE UP
-  ERTAPENEM: SIGNIFICANT CHANGE UP
-  GENTAMICIN: SIGNIFICANT CHANGE UP
-  LEVOFLOXACIN: SIGNIFICANT CHANGE UP
-  MEROPENEM: SIGNIFICANT CHANGE UP
-  NITROFURANTOIN: SIGNIFICANT CHANGE UP
-  PIPERACILLIN/TAZOBACTAM: SIGNIFICANT CHANGE UP
-  TOBRAMYCIN: SIGNIFICANT CHANGE UP
-  TRIMETHOPRIM/SULFAMETHOXAZOLE: SIGNIFICANT CHANGE UP
ALBUMIN SERPL ELPH-MCNC: 2.6 G/DL — LOW (ref 3.5–5)
ALP SERPL-CCNC: 105 U/L — SIGNIFICANT CHANGE UP (ref 40–120)
ALT FLD-CCNC: 27 U/L DA — SIGNIFICANT CHANGE UP (ref 10–60)
ANION GAP SERPL CALC-SCNC: 6 MMOL/L — SIGNIFICANT CHANGE UP (ref 5–17)
AST SERPL-CCNC: 23 U/L — SIGNIFICANT CHANGE UP (ref 10–40)
BASOPHILS # BLD AUTO: 0.02 K/UL — SIGNIFICANT CHANGE UP (ref 0–0.2)
BASOPHILS NFR BLD AUTO: 0.3 % — SIGNIFICANT CHANGE UP (ref 0–2)
BILIRUB SERPL-MCNC: 0.9 MG/DL — SIGNIFICANT CHANGE UP (ref 0.2–1.2)
BUN SERPL-MCNC: 18 MG/DL — SIGNIFICANT CHANGE UP (ref 7–18)
CALCIUM SERPL-MCNC: 8.3 MG/DL — LOW (ref 8.4–10.5)
CHLORIDE SERPL-SCNC: 96 MMOL/L — SIGNIFICANT CHANGE UP (ref 96–108)
CO2 SERPL-SCNC: 31 MMOL/L — SIGNIFICANT CHANGE UP (ref 22–31)
CREAT SERPL-MCNC: 0.9 MG/DL — SIGNIFICANT CHANGE UP (ref 0.5–1.3)
CRP SERPL-MCNC: 14.9 MG/L — HIGH (ref 0–5)
EGFR: 60 ML/MIN/1.73M2 — SIGNIFICANT CHANGE UP
EGFR: 60 ML/MIN/1.73M2 — SIGNIFICANT CHANGE UP
EOSINOPHIL # BLD AUTO: 0.03 K/UL — SIGNIFICANT CHANGE UP (ref 0–0.5)
EOSINOPHIL NFR BLD AUTO: 0.4 % — SIGNIFICANT CHANGE UP (ref 0–6)
ERYTHROCYTE [SEDIMENTATION RATE] IN BLOOD: 7 MM/HR — SIGNIFICANT CHANGE UP (ref 0–20)
GLUCOSE BLDC GLUCOMTR-MCNC: 137 MG/DL — HIGH (ref 70–99)
GLUCOSE BLDC GLUCOMTR-MCNC: 146 MG/DL — HIGH (ref 70–99)
GLUCOSE BLDC GLUCOMTR-MCNC: 88 MG/DL — SIGNIFICANT CHANGE UP (ref 70–99)
GLUCOSE BLDC GLUCOMTR-MCNC: 95 MG/DL — SIGNIFICANT CHANGE UP (ref 70–99)
GLUCOSE SERPL-MCNC: 96 MG/DL — SIGNIFICANT CHANGE UP (ref 70–99)
HCT VFR BLD CALC: 46.5 % — HIGH (ref 34.5–45)
HGB BLD-MCNC: 15.9 G/DL — HIGH (ref 11.5–15.5)
IMM GRANULOCYTES NFR BLD AUTO: 1.2 % — HIGH (ref 0–0.9)
LYMPHOCYTES # BLD AUTO: 1.59 K/UL — SIGNIFICANT CHANGE UP (ref 1–3.3)
LYMPHOCYTES # BLD AUTO: 21.8 % — SIGNIFICANT CHANGE UP (ref 13–44)
MAGNESIUM SERPL-MCNC: 1.9 MG/DL — SIGNIFICANT CHANGE UP (ref 1.6–2.6)
MCHC RBC-ENTMCNC: 31.2 PG — SIGNIFICANT CHANGE UP (ref 27–34)
MCHC RBC-ENTMCNC: 34.2 G/DL — SIGNIFICANT CHANGE UP (ref 32–36)
MCV RBC AUTO: 91.2 FL — SIGNIFICANT CHANGE UP (ref 80–100)
METHOD TYPE: SIGNIFICANT CHANGE UP
MONOCYTES # BLD AUTO: 0.24 K/UL — SIGNIFICANT CHANGE UP (ref 0–0.9)
MONOCYTES NFR BLD AUTO: 3.3 % — SIGNIFICANT CHANGE UP (ref 2–14)
NEUTROPHILS # BLD AUTO: 5.33 K/UL — SIGNIFICANT CHANGE UP (ref 1.8–7.4)
NEUTROPHILS NFR BLD AUTO: 73 % — SIGNIFICANT CHANGE UP (ref 43–77)
NRBC BLD AUTO-RTO: 0 /100 WBCS — SIGNIFICANT CHANGE UP (ref 0–0)
PHOSPHATE SERPL-MCNC: 2.5 MG/DL — SIGNIFICANT CHANGE UP (ref 2.5–4.5)
PLATELET # BLD AUTO: 129 K/UL — LOW (ref 150–400)
POTASSIUM SERPL-MCNC: 3.4 MMOL/L — LOW (ref 3.5–5.3)
POTASSIUM SERPL-SCNC: 3.4 MMOL/L — LOW (ref 3.5–5.3)
PROT SERPL-MCNC: 5.7 G/DL — LOW (ref 6–8.3)
RBC # BLD: 5.1 M/UL — SIGNIFICANT CHANGE UP (ref 3.8–5.2)
RBC # FLD: 13.3 % — SIGNIFICANT CHANGE UP (ref 10.3–14.5)
RHEUMATOID FACT SERPL-ACNC: 10 IU/ML — SIGNIFICANT CHANGE UP (ref 0–13)
SODIUM SERPL-SCNC: 133 MMOL/L — LOW (ref 135–145)
TSH SERPL-MCNC: 1.92 UU/ML — SIGNIFICANT CHANGE UP (ref 0.34–4.82)
WBC # BLD: 7.3 K/UL — SIGNIFICANT CHANGE UP (ref 3.8–10.5)
WBC # FLD AUTO: 7.3 K/UL — SIGNIFICANT CHANGE UP (ref 3.8–10.5)

## 2025-03-24 PROCEDURE — 99232 SBSQ HOSP IP/OBS MODERATE 35: CPT

## 2025-03-24 RX ORDER — INSULIN GLARGINE-YFGN 100 [IU]/ML
8 INJECTION, SOLUTION SUBCUTANEOUS AT BEDTIME
Refills: 0 | Status: DISCONTINUED | OUTPATIENT
Start: 2025-03-24 | End: 2025-03-25

## 2025-03-24 RX ADMIN — CARVEDILOL 6.25 MILLIGRAM(S): 3.12 TABLET, FILM COATED ORAL at 05:41

## 2025-03-24 RX ADMIN — Medication 20 MILLIGRAM(S): at 18:41

## 2025-03-24 RX ADMIN — Medication 20 MILLIGRAM(S): at 05:40

## 2025-03-24 RX ADMIN — INSULIN GLARGINE-YFGN 8 UNIT(S): 100 INJECTION, SOLUTION SUBCUTANEOUS at 22:42

## 2025-03-24 RX ADMIN — MONTELUKAST SODIUM 10 MILLIGRAM(S): 10 TABLET ORAL at 12:55

## 2025-03-24 RX ADMIN — ANASTROZOLE 1 MILLIGRAM(S): 1 TABLET ORAL at 12:55

## 2025-03-24 RX ADMIN — Medication 1 DOSE(S): at 21:34

## 2025-03-24 RX ADMIN — TIOTROPIUM BROMIDE INHALATION SPRAY 2 PUFF(S): 3.12 SPRAY, METERED RESPIRATORY (INHALATION) at 12:56

## 2025-03-24 RX ADMIN — FUROSEMIDE 40 MILLIGRAM(S): 10 INJECTION INTRAMUSCULAR; INTRAVENOUS at 05:41

## 2025-03-24 RX ADMIN — ERTAPENEM SODIUM 120 MILLIGRAM(S): 1 INJECTION, POWDER, LYOPHILIZED, FOR SOLUTION INTRAMUSCULAR; INTRAVENOUS at 18:41

## 2025-03-24 RX ADMIN — METFORMIN HYDROCHLORIDE 500 MILLIGRAM(S): 850 TABLET ORAL at 18:41

## 2025-03-24 RX ADMIN — ATORVASTATIN CALCIUM 10 MILLIGRAM(S): 80 TABLET, FILM COATED ORAL at 21:30

## 2025-03-24 RX ADMIN — APIXABAN 5 MILLIGRAM(S): 2.5 TABLET, FILM COATED ORAL at 05:41

## 2025-03-24 RX ADMIN — APIXABAN 5 MILLIGRAM(S): 2.5 TABLET, FILM COATED ORAL at 18:41

## 2025-03-24 RX ADMIN — Medication 40 MILLIEQUIVALENT(S): at 09:08

## 2025-03-24 RX ADMIN — Medication 1 DOSE(S): at 12:55

## 2025-03-24 RX ADMIN — Medication 50 MILLIGRAM(S): at 05:40

## 2025-03-24 RX ADMIN — METFORMIN HYDROCHLORIDE 500 MILLIGRAM(S): 850 TABLET ORAL at 05:41

## 2025-03-24 NOTE — PROGRESS NOTE ADULT - SUBJECTIVE AND OBJECTIVE BOX
Date of Service 03-24-25 @ 10:55    CHIEF COMPLAINT:Patient is a 89y old  Female who presents with a chief complaint of Hyperglycemia, UTI.Pt appears comfortable.    	  REVIEW OF SYSTEMS:  CONSTITUTIONAL: No fever, weight loss, or fatigue  EYES: No eye pain, visual disturbances, or discharge  ENT:  No difficulty hearing, tinnitus, vertigo; No sinus or throat pain  NECK: No pain or stiffness  RESPIRATORY: No cough, wheezing, chills or hemoptysis; No Shortness of Breath  CARDIOVASCULAR: No chest pain, palpitations, passing out, dizziness, or leg swelling  GASTROINTESTINAL: No abdominal or epigastric pain. No nausea, vomiting, or hematemesis; No diarrhea or constipation. No melena or hematochezia.  GENITOURINARY: No dysuria, frequency, hematuria, or incontinence  NEUROLOGICAL: No headaches, memory loss, loss of strength, numbness, or tremors  SKIN: No itching, burning, rashes, or lesions   LYMPH Nodes: No enlarged glands  ENDOCRINE: No heat or cold intolerance; No hair loss  MUSCULOSKELETAL: No joint pain or swelling; No muscle, back, or extremity pain  PSYCHIATRIC: No depression, anxiety, mood swings, or difficulty sleeping  HEME/LYMPH: No easy bruising, or bleeding gums  ALLERGY AND IMMUNOLOGIC: No hives or eczema	      PHYSICAL EXAM:  T(C): 36.4 (03-24-25 @ 10:32), Max: 36.8 (03-23-25 @ 21:13)  HR: 77 (03-24-25 @ 10:32) (72 - 95)  BP: 110/76 (03-24-25 @ 10:32) (105/70 - 126/74)  RR: 16 (03-24-25 @ 10:32) (16 - 18)  SpO2: 98% (03-24-25 @ 10:32) (98% - 99%)  Wt(kg): --  I&O's Summary    23 Mar 2025 07:01  -  24 Mar 2025 07:00  --------------------------------------------------------  IN: 0 mL / OUT: 500 mL / NET: -500 mL        Appearance: Normal	  HEENT:   Normal oral mucosa, PERRL, EOMI	  Lymphatic: No lymphadenopathy  Cardiovascular: Normal S1 S2, No JVD, No murmurs, No edema  Respiratory: Lungs clear to auscultation	  Psychiatry: A & O x 3, Mood & affect appropriate  Gastrointestinal:  Soft, Non-tender, + BS	  Skin: No rashes, No ecchymoses, No cyanosis	  Neurologic: Non-focal  Extremities: Normal range of motion, No clubbing, cyanosis or edema  Vascular: Peripheral pulses palpable 2+ bilaterally    MEDICATIONS  (STANDING):  anastrozole 1 milliGRAM(s) Oral daily  apixaban 5 milliGRAM(s) Oral every 12 hours  atorvastatin 10 milliGRAM(s) Oral at bedtime  carvedilol 6.25 milliGRAM(s) Oral every 12 hours  ertapenem  IVPB 1000 milliGRAM(s) IV Intermittent every 24 hours  famotidine    Tablet 20 milliGRAM(s) Oral two times a day  fluticasone propionate/ salmeterol 100-50 MICROgram(s) Diskus 1 Dose(s) Inhalation two times a day  furosemide    Tablet 40 milliGRAM(s) Oral daily  hydrALAZINE 50 milliGRAM(s) Oral every 8 hours  insulin glargine Injectable (LANTUS) 12 Unit(s) SubCutaneous at bedtime  insulin lispro (ADMELOG) corrective regimen sliding scale   SubCutaneous three times a day before meals  insulin lispro (ADMELOG) corrective regimen sliding scale   SubCutaneous at bedtime  metFORMIN 500 milliGRAM(s) Oral two times a day  montelukast 10 milliGRAM(s) Oral daily  tiotropium 2.5 MICROgram(s) Inhaler 2 Puff(s) Inhalation daily      LABS:	 	    CARDIAC MARKERS:      Troponin I, High Sensitivity Result: 72.3 ng/L (03-21 @ 18:00)  Troponin I, High Sensitivity Result: 77.0 ng/L (03-21 @ 15:37)                            15.9   7.30  )-----------( 129      ( 24 Mar 2025 06:16 )             46.5     03-24    133[L]  |  96  |  18  ----------------------------<  96  3.4[L]   |  31  |  0.90    Ca    8.3[L]      24 Mar 2025 06:16  Phos  2.5     03-24  Mg     1.9     03-24    TPro  5.7[L]  /  Alb  2.6[L]  /  TBili  0.9  /  DBili  x   /  AST  23  /  ALT  27  /  AlkPhos  105  03-24    Culture - Urine (03.21.25 @ 15:15)   Specimen Source: Catheterized Catheterized  Culture Results:   >100,000 CFU/ml Proteus mirabilis

## 2025-03-24 NOTE — DIETITIAN INITIAL EVALUATION ADULT - ORAL INTAKE PTA/DIET HISTORY
Pt is from home, alert however disorientated and with increased lethargy, sleeping at time of visit. H/o HTN, HLD, COPD, DVT, HF, breast cancer, A fib, pre-diabetes, legally blind, obesity; admitted for uncontrolled hyperglycemia and UTI. Pt is currently with poor PO intake consuming <25% of meals d/t increased lethargy as per discussion with nursing staff and breakfast tray observed at bedside. Weight of 213 lbs obtained via bed scale. No chewing/ swallowing issues or GI distress reported. Chicken intolerance is noted. Finger sticks range from  with HA1c 10.6% indicating poor glycemic control, however pt is not a good candidate for nutrition education at this time d/t altered mental status.

## 2025-03-24 NOTE — PROGRESS NOTE ADULT - SUBJECTIVE AND OBJECTIVE BOX
PGY-1 Progress Note discussed with attending    INTERVAL HPI/OVERNIGHT EVENTS: AAOx2, seen at bedside, no acute complaints.     MEDICATIONS  (STANDING):  anastrozole 1 milliGRAM(s) Oral daily  apixaban 5 milliGRAM(s) Oral every 12 hours  atorvastatin 10 milliGRAM(s) Oral at bedtime  carvedilol 6.25 milliGRAM(s) Oral every 12 hours  ertapenem  IVPB 1000 milliGRAM(s) IV Intermittent every 24 hours  famotidine    Tablet 20 milliGRAM(s) Oral two times a day  fluticasone propionate/ salmeterol 100-50 MICROgram(s) Diskus 1 Dose(s) Inhalation two times a day  furosemide    Tablet 40 milliGRAM(s) Oral daily  hydrALAZINE 50 milliGRAM(s) Oral every 8 hours  insulin glargine Injectable (LANTUS) 8 Unit(s) SubCutaneous at bedtime  insulin lispro (ADMELOG) corrective regimen sliding scale   SubCutaneous three times a day before meals  insulin lispro (ADMELOG) corrective regimen sliding scale   SubCutaneous at bedtime  metFORMIN 500 milliGRAM(s) Oral two times a day  montelukast 10 milliGRAM(s) Oral daily  tiotropium 2.5 MICROgram(s) Inhaler 2 Puff(s) Inhalation daily    MEDICATIONS  (PRN):  acetaminophen     Tablet .. 650 milliGRAM(s) Oral every 6 hours PRN Temp greater or equal to 38C (100.4F), Mild Pain (1 - 3)  albuterol    90 MICROgram(s) HFA Inhaler 2 Puff(s) Inhalation every 6 hours PRN for bronchospasm  aluminum hydroxide/magnesium hydroxide/simethicone Suspension 30 milliLiter(s) Oral every 4 hours PRN Dyspepsia  melatonin 3 milliGRAM(s) Oral at bedtime PRN Insomnia  ondansetron Injectable 4 milliGRAM(s) IV Push every 8 hours PRN Nausea and/or Vomiting      REVIEW OF SYSTEMS:  CONSTITUTIONAL: No fever, weight loss, or fatigue  RESPIRATORY: No cough, wheezing, chills or hemoptysis; No shortness of breath  CARDIOVASCULAR: No chest pain, palpitations, dizziness, or leg swelling  GASTROINTESTINAL: No abdominal pain. No nausea, vomiting, or hematemesis; No diarrhea or constipation. No melena or hematochezia.  GENITOURINARY: No dysuria or hematuria, urinary frequency  NEUROLOGICAL: No headaches, memory loss, loss of strength, numbness, or tremors  SKIN: No itching, burning, rashes, or lesions     Vital Signs Last 24 Hrs  T(C): 36.4 (24 Mar 2025 10:32), Max: 36.8 (23 Mar 2025 21:13)  T(F): 97.6 (24 Mar 2025 10:32), Max: 98.2 (23 Mar 2025 21:13)  HR: 77 (24 Mar 2025 10:32) (72 - 95)  BP: 110/76 (24 Mar 2025 10:32) (105/70 - 126/74)  BP(mean): 88 (24 Mar 2025 10:32) (88 - 98)  RR: 16 (24 Mar 2025 10:32) (16 - 18)  SpO2: 98% (24 Mar 2025 10:32) (98% - 99%)    Parameters below as of 24 Mar 2025 10:32  Patient On (Oxygen Delivery Method): room air    GENERAL: NAD, well-groomed, well-developed  HEAD:  Atraumatic, Normocephalic  EYES: Legally blind in L eye  ENMT:  Moist mucous membranes  NERVOUS SYSTEM:  awake   CHEST/LUNG:  No rales, rhonchi, wheezing, or rubs  HEART: Regular rate and rhythm; No murmurs, rubs, or gallops  ABDOMEN: Soft, Nontender, Nondistended; Bowel sounds present  EXTREMITIES:  2+ Peripheral Pulses, No clubbing, cyanosis, or edema  LYMPH: No lymphadenopathy noted  SKIN: No rashes or lesions                          15.9   7.30  )-----------( 129      ( 24 Mar 2025 06:16 )             46.5     03-24    133[L]  |  96  |  18  ----------------------------<  96  3.4[L]   |  31  |  0.90    Ca    8.3[L]      24 Mar 2025 06:16  Phos  2.5     03-24  Mg     1.9     03-24    TPro  5.7[L]  /  Alb  2.6[L]  /  TBili  0.9  /  DBili  x   /  AST  23  /  ALT  27  /  AlkPhos  105  03-24    LIVER FUNCTIONS - ( 24 Mar 2025 06:16 )  Alb: 2.6 g/dL / Pro: 5.7 g/dL / ALK PHOS: 105 U/L / ALT: 27 U/L DA / AST: 23 U/L / GGT: x                   CAPILLARY BLOOD GLUCOSE      RADIOLOGY & ADDITIONAL TESTS:

## 2025-03-24 NOTE — DIETITIAN INITIAL EVALUATION ADULT - ADD RECOMMEND
Recommend to liberalize current diet order to Consistent Carbohydrate (No Snacks) as medically feasible.

## 2025-03-24 NOTE — PROGRESS NOTE ADULT - ASSESSMENT
89 yo F, w/ morbid obesity, is legally blind ambulates with walker, with PMH of HTN, HLD, COPD (never smoker, on 2L O2 at home), BIPAP at night , HFpEF (EF 55-60%), severe RV, chronic Afib on Eliquis, Pulmonary HTN, frequent UTI's presents from her doctors office with hyperglycemia and UTI.  1.UTI-ABX as per ID..  2.Chronic afib-eliquis,coreg.  3.DM-Insulin,Endo f/u.  4.HTN-cont bp medication.  5.COPD-MDI,singulair.  6.Lipid d/o-statin.  7.Replace k+.  8.PPI.

## 2025-03-24 NOTE — PROGRESS NOTE ADULT - SUBJECTIVE AND OBJECTIVE BOX
Patient is seen and examined at the bed side, is afebrile. The urine culture from 3/21 growing Gram negative Rods, not finalized yet.      REVIEW OF SYSTEMS: All other review systems are negative      ALLERGIES: Chicken (Stomach Upset)  losartan (Angioedema)      Vital Signs Last 24 Hrs  T(C): 36.4 (24 Mar 2025 14:16), Max: 36.8 (23 Mar 2025 21:13)  T(F): 97.6 (24 Mar 2025 14:16), Max: 98.2 (23 Mar 2025 21:13)  HR: 85 (24 Mar 2025 14:16) (72 - 95)  BP: 103/78 (24 Mar 2025 14:16) (103/78 - 126/74)  BP(mean): 88 (24 Mar 2025 10:32) (88 - 88)  RR: 17 (24 Mar 2025 14:16) (16 - 17)  SpO2: 96% (24 Mar 2025 14:16) (96% - 99%)    Parameters below as of 24 Mar 2025 14:16  Patient On (Oxygen Delivery Method): room air        PHYSICAL EXAM:  GENERAL: Not in distress   CHEST/LUNG:  Air entry bilaterally  HEART: s1 and s2 present  ABDOMEN:  Nontender and  Nondistended  EXTREMITIES: No pedal  edema  CNS: Awake and Alert        LABS:                        15.9   7.30  )-----------( 129      ( 24 Mar 2025 06:16 )             46.5                           15.5   8.10  )-----------( 142      ( 23 Mar 2025 06:50 )             44.7           03-24    133[L]  |  96  |  18  ----------------------------<  96  3.4[L]   |  31  |  0.90    Ca    8.3[L]      24 Mar 2025 06:16  Phos  2.5     03-24  Mg     1.9     03-24    TPro  5.7[L]  /  Alb  2.6[L]  /  TBili  0.9  /  DBili  x   /  AST  23  /  ALT  27  /  AlkPhos  105  03-24    03-23    133[L]  |  97  |  20[H]  ----------------------------<  100[H]  3.3[L]   |  32[H]  |  0.80    Ca    8.2[L]      23 Mar 2025 06:50  Phos  2.2     03-23  Mg     1.8     03-23    TPro  5.9[L]  /  Alb  2.6[L]  /  TBili  0.9  /  DBili  x   /  AST  20  /  ALT  28  /  AlkPhos  104  03-23      CAPILLARY BLOOD GLUCOSE  POCT Blood Glucose.: 218 mg/dL (22 Mar 2025 12:30)  POCT Blood Glucose.: 318 mg/dL (22 Mar 2025 07:59)  POCT Blood Glucose.: 304 mg/dL (22 Mar 2025 05:39)  POCT Blood Glucose.: 370 mg/dL (21 Mar 2025 18:00)  POCT Blood Glucose.: 572 mg/dL (21 Mar 2025 14:19)        MEDICATIONS  (STANDING):    anastrozole 1 milliGRAM(s) Oral daily  apixaban 5 milliGRAM(s) Oral every 12 hours  atorvastatin 10 milliGRAM(s) Oral at bedtime  carvedilol 6.25 milliGRAM(s) Oral every 12 hours  ertapenem  IVPB 1000 milliGRAM(s) IV Intermittent every 24 hours  famotidine    Tablet 20 milliGRAM(s) Oral two times a day  fluticasone propionate/ salmeterol 100-50 MICROgram(s) Diskus 1 Dose(s) Inhalation two times a day  furosemide    Tablet 40 milliGRAM(s) Oral daily  hydrALAZINE 50 milliGRAM(s) Oral every 8 hours  insulin glargine Injectable (LANTUS) 8 Unit(s) SubCutaneous at bedtime  insulin lispro (ADMELOG) corrective regimen sliding scale   SubCutaneous three times a day before meals  insulin lispro (ADMELOG) corrective regimen sliding scale   SubCutaneous at bedtime  metFORMIN 500 milliGRAM(s) Oral two times a day  montelukast 10 milliGRAM(s) Oral daily  tiotropium 2.5 MICROgram(s) Inhaler 2 Puff(s) Inhalation daily        RADIOLOGY & ADDITIONAL TESTS:    3/21/25: Xray Chest 1 View-PORTABLE IMMEDIATE (Xray Chest 1 View-PORTABLE IMMEDIATE .) (03.21.25 @ 17:11) IMPRESSION: No acute cardiopulmonary disease process. Cardiomegaly.      MICROBIOLOGY DATA:    Culture - Urine (03.21.25 @ 15:15)   - Trimethoprim/Sulfamethoxazole: S <=0.5/9.5  - Tobramycin: S <=2  - Levofloxacin: S <=0.5  - Meropenem: S <=1  - Nitrofurantoin: R >64 Should not be used to treat pyelonephritis  - Piperacillin/Tazobactam: S <=8  - Cefazolin: S <=2 For uncomplicated UTI with K. pneumoniae, E. coli, or P. mirablis: NIDIA <=16 is sensitive and NIDIA >=32 is resistant. This also predicts results for oral agents cefaclor, cefdinir, cefpodoxime, cefprozil, cefuroxime axetil, cephalexin and locarbef for uncomplicated UTI. Note that some isolates may be susceptible to these agents while testing resistant to cefazolin.  - Cefepime: S <=2  - Cefoxitin: S <=8  - Ceftriaxone: S <=1  - Cefuroxime: S <=4  - Ciprofloxacin: S <=0.25  - Ertapenem: S <=0.5  - Gentamicin: S <=2  - Amoxicillin/Clavulanic Acid: S <=8/4  - Ampicillin: S <=8 These ampicillin results predict results for amoxicillin  - Ampicillin/Sulbactam: S <=4/2  - Aztreonam: S <=4  Specimen Source: Catheterized Catheterized  Culture Results:   >100,000 CFU/ml Proteus mirabilis  Organism Identification: Proteus mirabilis  Organism: Proteus mirabilis  Method Type: NIDIA  Culture - Urine (03.21.25 @ 15:15)   Specimen Source: Catheterized Catheterized  Culture Results:   >100,000 CFU/ml Gram Negative Rods    Urine Microscopic-Add On (NC) (03.21.25 @ 15:15)   White Blood Cell - Urine: >100 /HPF  Red Blood Cell - Urine: 4 /HPF  Bacteria: Few /HPF  Squamous Epithelial Cells: Present: FEW  Comment - Urine: Occasional budding yeasts present

## 2025-03-24 NOTE — DIETITIAN INITIAL EVALUATION ADULT - PERTINENT LABORATORY DATA
03-24    133[L]  |  96  |  18  ----------------------------<  96  3.4[L]   |  31  |  0.90    Ca    8.3[L]      24 Mar 2025 06:16  Phos  2.5     03-24  Mg     1.9     03-24    TPro  5.7[L]  /  Alb  2.6[L]  /  TBili  0.9  /  DBili  x   /  AST  23  /  ALT  27  /  AlkPhos  105  03-24  POCT Blood Glucose.: 88 mg/dL (03-24-25 @ 11:27)  A1C with Estimated Average Glucose Result: 10.6 % (03-22-25 @ 05:45)

## 2025-03-24 NOTE — PROGRESS NOTE ADULT - ASSESSMENT
Assessment and Plan:     Assessment: 89YoF Legally blind, W/PMH of morbid obesity, HTN, HLD, COPD(never smoker, on 2L O2 at home), BIPAP at night , HFpEF (EF 55-60%), severe RV, Afib on Eliquis, Pulmonary HTN, frequent UTI's ) was sent from her doctor's office for eval of hyperglycemia and malaise. Pt with recent admission to [facility] for PNA and UTI. Pt admitted for uncontrolled hyperglycemia and UTI. Neuro consulted for AMS.       Impression: AMS ISO hyperglycemia and UTI less likely c/f acute neurovascular process like stroke /Sz, etiology of confusion likely to be TME 2/2 UTI.       Recommendations:   - Please F/U on the labs recommended on consult notes(by Dr. Brown).  - No further neuro imaging needed at this point of time.   - HbA1C: 10.6, Further management of BG per primary team.   - UA, UCx and further management of UTI per primary team and ID.   - Correct the underlying TME, if pt continues to be w/AMS even after correcting the underlying etiology can consider further neuroimaging.   - Neurology will sign off at this point of time, please call us back with questions.     Informed Dr. Oliver.     'D/W Dr. Brown.  Assessment and Plan:     Assessment: 89YoF Legally blind, W/PMH of morbid obesity, HTN, HLD, COPD(never smoker, on 2L O2 at home), BIPAP at night , HFpEF (EF 55-60%), severe RV, Afib on Eliquis, Pulmonary HTN, frequent UTI's ) was sent from her doctor's office for eval of hyperglycemia and malaise. Pt with recent admission to [facility] for PNA and UTI. Pt admitted for uncontrolled hyperglycemia and UTI. Neuro consulted for AMS.       Impression: AMS ISO untreated possibly long standing hyperglycemia and UTI less likely c/f acute neurovascular process like stroke /Sz, etiology of confusion likely to be TME 2/2 UTI.       Recommendations:   - Please F/U on the labs recommended on consult notes(by Dr. Brown).  - No further neuro imaging needed at this point of time.   - HbA1C: 10.6, Further management of BG per primary team.   - UA, UCx and further management of UTI per primary team and ID.   - Correct the underlying TME, if pt continues to be w/AMS even after correcting the underlying etiology can consider further neuroimaging.   - Neurology will sign off at this point of time, please call us back with questions.     Informed Dr. Oliver.     D/W Dr. Brown.

## 2025-03-24 NOTE — PROGRESS NOTE ADULT - SUBJECTIVE AND OBJECTIVE BOX
Interval Events:      Allergies    losartan (Angioedema)    Intolerances    Chicken (Stomach Upset)    Endocrine/Metabolic Medications:  atorvastatin 10 milliGRAM(s) Oral at bedtime  insulin glargine Injectable (LANTUS) 12 Unit(s) SubCutaneous at bedtime  insulin lispro (ADMELOG) corrective regimen sliding scale   SubCutaneous three times a day before meals  insulin lispro (ADMELOG) corrective regimen sliding scale   SubCutaneous at bedtime  metFORMIN 500 milliGRAM(s) Oral two times a day      Vital Signs Last 24 Hrs  T(C): 36.4 (24 Mar 2025 10:32), Max: 36.8 (23 Mar 2025 21:13)  T(F): 97.6 (24 Mar 2025 10:32), Max: 98.2 (23 Mar 2025 21:13)  HR: 77 (24 Mar 2025 10:32) (72 - 95)  BP: 110/76 (24 Mar 2025 10:32) (99/64 - 126/74)  BP(mean): 88 (24 Mar 2025 10:32) (73 - 98)  RR: 16 (24 Mar 2025 10:32) (16 - 18)  SpO2: 98% (24 Mar 2025 10:32) (96% - 99%)    Parameters below as of 24 Mar 2025 10:32  Patient On (Oxygen Delivery Method): room air          PHYSICAL EXAM  All physical exam findings normal, except those marked:  General:	Alert, active, cooperative, NAD, well hydrated  .		[] Abnormal:  Neck		Normal: supple, no cervical adenopathy, no palpable thyroid  .		[] Abnormal:  Cardiovascular	Normal: regular rate, normal S1, S2, no murmurs  .		[] Abnormal:  Respiratory	Normal: no chest wall deformity, normal respiratory pattern, CTA B/L  .		[] Abnormal:  Abdominal	Normal: soft, ND, NT, bowel sounds present, no masses, no organomegaly  .		[] Abnormal:  		Normal normal genitalia, testes descended, circumcised/uncircumcised  .		Amy stage:			Breast amy:  .		Menstrual history:  .		[] Abnormal:  Extremities	Normal: FROM x4  .		[] Abnormal:  Skin		Normal: intact and not indurated, no rash, no acanthosis nigricans  .		[] Abnormal:  Neurologic	Normal: grossly intact  .		[] Abnormal:    LABS                        15.9   7.30  )-----------( 129      ( 24 Mar 2025 06:16 )             46.5                               133    |  96     |  18                  Calcium: 8.3   / iCa: x      (03-24 @ 06:16)    ----------------------------<  96        Magnesium: 1.9                              3.4     |  31     |  0.90             Phosphorous: 2.5      TPro  5.7    /  Alb  2.6    /  TBili  0.9    /  DBili  x      /  AST  23     /  ALT  27     /  AlkPhos  105    24 Mar 2025 06:16    CAPILLARY BLOOD GLUCOSE      POCT Blood Glucose.: 95 mg/dL (24 Mar 2025 07:49)  POCT Blood Glucose.: 89 mg/dL (23 Mar 2025 21:37)  POCT Blood Glucose.: 127 mg/dL (23 Mar 2025 16:40)  POCT Blood Glucose.: 84 mg/dL (23 Mar 2025 11:28)        Assesment/plan       Interval Events:  pt in nad    Allergies    losartan (Angioedema)    Intolerances    Chicken (Stomach Upset)    Endocrine/Metabolic Medications:  atorvastatin 10 milliGRAM(s) Oral at bedtime  insulin glargine Injectable (LANTUS) 12 Unit(s) SubCutaneous at bedtime  insulin lispro (ADMELOG) corrective regimen sliding scale   SubCutaneous three times a day before meals  insulin lispro (ADMELOG) corrective regimen sliding scale   SubCutaneous at bedtime  metFORMIN 500 milliGRAM(s) Oral two times a day      Vital Signs Last 24 Hrs  T(C): 36.4 (24 Mar 2025 10:32), Max: 36.8 (23 Mar 2025 21:13)  T(F): 97.6 (24 Mar 2025 10:32), Max: 98.2 (23 Mar 2025 21:13)  HR: 77 (24 Mar 2025 10:32) (72 - 95)  BP: 110/76 (24 Mar 2025 10:32) (99/64 - 126/74)  BP(mean): 88 (24 Mar 2025 10:32) (73 - 98)  RR: 16 (24 Mar 2025 10:32) (16 - 18)  SpO2: 98% (24 Mar 2025 10:32) (96% - 99%)    Parameters below as of 24 Mar 2025 10:32  Patient On (Oxygen Delivery Method): room air          PHYSICAL EXAM  All physical exam findings normal, except those marked:  General:	Alert, active, cooperative, NAD, well hydrated  .		[] Abnormal:  Neck		Normal: supple, no cervical adenopathy, no palpable thyroid  .		[] Abnormal:  Cardiovascular	Normal: regular rate, normal S1, S2, no murmurs  .		[] Abnormal:  Respiratory	Normal: no chest wall deformity, normal respiratory pattern, CTA B/L  .		[] Abnormal:  Abdominal	Normal: soft, ND, NT, bowel sounds present, no masses, no organomegaly  .		[] Abnormal:  		Normal normal genitalia, testes descended, circumcised/uncircumcised  .		Amy stage:			Breast amy:  .		Menstrual history:  .		[] Abnormal:  Extremities	Normal: FROM x4  .		[] Abnormal:  Skin		Normal: intact and not indurated, no rash, no acanthosis nigricans  .		[] Abnormal:  Neurologic	Normal: grossly intact  .		[] Abnormal:    LABS                        15.9   7.30  )-----------( 129      ( 24 Mar 2025 06:16 )             46.5                               133    |  96     |  18                  Calcium: 8.3   / iCa: x      (03-24 @ 06:16)    ----------------------------<  96        Magnesium: 1.9                              3.4     |  31     |  0.90             Phosphorous: 2.5      TPro  5.7    /  Alb  2.6    /  TBili  0.9    /  DBili  x      /  AST  23     /  ALT  27     /  AlkPhos  105    24 Mar 2025 06:16    CAPILLARY BLOOD GLUCOSE      POCT Blood Glucose.: 95 mg/dL (24 Mar 2025 07:49)  POCT Blood Glucose.: 89 mg/dL (23 Mar 2025 21:37)  POCT Blood Glucose.: 127 mg/dL (23 Mar 2025 16:40)  POCT Blood Glucose.: 84 mg/dL (23 Mar 2025 11:28)        Assesment/plan    89 yo F, w/ morbid obesity, is legally blind ambulates with walker, with PMH of HTN, HLD, COPD (never smoker, on 2L O2 at home), BIPAP at night , HFpEF (EF 55-60%), severe RV, Afib on Eliquis, Pulmonary HTN, frequent UTI's presents from her doctors office with hyperglycemia.   Endocrine consulted for dm management. Pt has hx of pre dm - on no meds.          Problem/Recommendation - 1:  ·  Problem: Hyperglycemia.   ·  Recommendation: new onset dm with severe hyperglycemia  now toghtly controlled   dec lantus to 8 units  d/cadmelog 4 ac tid  cont metformin   dm teaching to family- pt legally blind  d/c home on lantus and metformin  fsg ac and hs.

## 2025-03-24 NOTE — PROGRESS NOTE ADULT - SUBJECTIVE AND OBJECTIVE BOX
NEUROLOGY FOLLOW-UP CONSULT NOTE    RFC: AMS ISO Hyperglycemia and UTI.     Interval history: No acute neurologic events overnight. Pt seen and evaluated @ bedside with Dr. Brown.     Meds:  MEDICATIONS  (STANDING):  anastrozole 1 milliGRAM(s) Oral daily  apixaban 5 milliGRAM(s) Oral every 12 hours  atorvastatin 10 milliGRAM(s) Oral at bedtime  carvedilol 6.25 milliGRAM(s) Oral every 12 hours  ertapenem  IVPB 1000 milliGRAM(s) IV Intermittent every 24 hours  famotidine    Tablet 20 milliGRAM(s) Oral two times a day  fluticasone propionate/ salmeterol 100-50 MICROgram(s) Diskus 1 Dose(s) Inhalation two times a day  furosemide    Tablet 40 milliGRAM(s) Oral daily  hydrALAZINE 50 milliGRAM(s) Oral every 8 hours  insulin glargine Injectable (LANTUS) 8 Unit(s) SubCutaneous at bedtime  insulin lispro (ADMELOG) corrective regimen sliding scale   SubCutaneous three times a day before meals  insulin lispro (ADMELOG) corrective regimen sliding scale   SubCutaneous at bedtime  metFORMIN 500 milliGRAM(s) Oral two times a day  montelukast 10 milliGRAM(s) Oral daily  tiotropium 2.5 MICROgram(s) Inhaler 2 Puff(s) Inhalation daily    MEDICATIONS  (PRN):  acetaminophen     Tablet .. 650 milliGRAM(s) Oral every 6 hours PRN Temp greater or equal to 38C (100.4F), Mild Pain (1 - 3)  albuterol    90 MICROgram(s) HFA Inhaler 2 Puff(s) Inhalation every 6 hours PRN for bronchospasm  aluminum hydroxide/magnesium hydroxide/simethicone Suspension 30 milliLiter(s) Oral every 4 hours PRN Dyspepsia  melatonin 3 milliGRAM(s) Oral at bedtime PRN Insomnia  ondansetron Injectable 4 milliGRAM(s) IV Push every 8 hours PRN Nausea and/or Vomiting      PMHx/PSHx/FHx/SHx:  Hyperglycemia  Complex Care    Family history of hypertension in mother (Father, Mother)  FH: HTN (hypertension)    History of hypertension  Arthritis  Legally blind  Pre-diabetes  Breast cancer  COPD exacerbation  Atrial fibrillation  HF (heart failure)  HTN (hypertension)  Deep vein thrombosis (DVT)  H/O CHF (Chronic heart failure with preserved ejection fraction (HFpEF)  HLD (hyperlipidemia)  Acute UTI  HLD (hyperlipidemia)  Acute metabolic encephalopathy  Non-ST elevation MI (NSTEMI)        Allergies:  Chicken (Stomach Upset)  losartan (Angioedema)          O:  T(C): 36.4 (25 @ 14:16), Max: 36.8 (25 @ 21:13)  HR: 85 (25 @ 14:16) (72 - 95)  BP: 103/78 (25 @ 14:16) (103/78 - 126/74)  RR: 17 (25 @ 14:16) (16 - 17)  SpO2: 96% (25 @ 14:16) (96% - 99%)    Focused neurologic exam:  MS - Resting with eyes closed, opens eyes to verbal stimuli, upon calling her name, Oriented to self(able to state her name, , age as 89, 89 then repeats her year of birth "I was born in 1931), oriented to place (states she is in the hospital because her sugars went up), partially oriented to time(able to state the year as , month as April, states May when asked what was the month before april). Minimal /limited verbal interaction, appears frustrated upon repeating instructions, limited attention span, inconsistently follows some simple commands.     CN - Pupils GIGI 2/2 pt resisting eye opening, R pupil 3.5mm with no reaction to light, pt legally blind, unable to even perceive light, GIGI L pupil, EOMs and VFF. Face appears grossly symmetrical.   Motor - Normal bulk/tone, Not following commands, noted some spontaneous movements (very little distal movements) B/L UE, barely wiggles B/L Toes.   Sens - LT  intact all  DTR's - GIGI.   Coord , Gait and station - GIGI and Gait assessment deferred during my encounter 2/2 pt is bed bound.     Pertinent labs/studies:                        15.9   7.30  )-----------( 129      ( 24 Mar 2025 06:16 )             46.5         133[L]  |  96  |  18  ----------------------------<  96  3.4[L]   |  31  |  0.90    Ca    8.3[L]      24 Mar 2025 06:16  Phos  2.5     03-24  Mg     1.9     03-24    TPro  5.7[L]  /  Alb  2.6[L]  /  TBili  0.9  /  DBili  x   /  AST  23  /  ALT  27  /  AlkPhos  105  -24      A1C with Estimated Average Glucose Result: 10.6           NEUROLOGY FOLLOW-UP CONSULT NOTE    RFC: AMS ISO Hyperglycemia and UTI.     Interval history: No acute neurologic events overnight. Pt seen and evaluated @ bedside with Dr. Brown.     Meds:  MEDICATIONS  (STANDING):  anastrozole 1 milliGRAM(s) Oral daily  apixaban 5 milliGRAM(s) Oral every 12 hours  atorvastatin 10 milliGRAM(s) Oral at bedtime  carvedilol 6.25 milliGRAM(s) Oral every 12 hours  ertapenem  IVPB 1000 milliGRAM(s) IV Intermittent every 24 hours  famotidine    Tablet 20 milliGRAM(s) Oral two times a day  fluticasone propionate/ salmeterol 100-50 MICROgram(s) Diskus 1 Dose(s) Inhalation two times a day  furosemide    Tablet 40 milliGRAM(s) Oral daily  hydrALAZINE 50 milliGRAM(s) Oral every 8 hours  insulin glargine Injectable (LANTUS) 8 Unit(s) SubCutaneous at bedtime  insulin lispro (ADMELOG) corrective regimen sliding scale   SubCutaneous three times a day before meals  insulin lispro (ADMELOG) corrective regimen sliding scale   SubCutaneous at bedtime  metFORMIN 500 milliGRAM(s) Oral two times a day  montelukast 10 milliGRAM(s) Oral daily  tiotropium 2.5 MICROgram(s) Inhaler 2 Puff(s) Inhalation daily    MEDICATIONS  (PRN):  acetaminophen     Tablet .. 650 milliGRAM(s) Oral every 6 hours PRN Temp greater or equal to 38C (100.4F), Mild Pain (1 - 3)  albuterol    90 MICROgram(s) HFA Inhaler 2 Puff(s) Inhalation every 6 hours PRN for bronchospasm  aluminum hydroxide/magnesium hydroxide/simethicone Suspension 30 milliLiter(s) Oral every 4 hours PRN Dyspepsia  melatonin 3 milliGRAM(s) Oral at bedtime PRN Insomnia  ondansetron Injectable 4 milliGRAM(s) IV Push every 8 hours PRN Nausea and/or Vomiting      PMHx/PSHx/FHx/SHx:  Hyperglycemia  Complex Care    Family history of hypertension in mother (Father, Mother)  FH: HTN (hypertension)    History of hypertension  Arthritis  Legally blind  Pre-diabetes  Breast cancer  COPD exacerbation  Atrial fibrillation  HF (heart failure)  HTN (hypertension)  Deep vein thrombosis (DVT)  H/O CHF (Chronic heart failure with preserved ejection fraction (HFpEF)  HLD (hyperlipidemia)  Acute UTI  HLD (hyperlipidemia)  Acute metabolic encephalopathy  Non-ST elevation MI (NSTEMI)        Allergies:  Chicken (Stomach Upset)  losartan (Angioedema)          O:  T(C): 36.4 (25 @ 14:16), Max: 36.8 (25 @ 21:13)  HR: 85 (25 @ 14:16) (72 - 95)  BP: 103/78 (25 @ 14:16) (103/78 - 126/74)  RR: 17 (25 @ 14:16) (16 - 17)  SpO2: 96% (25 @ 14:16) (96% - 99%)    Focused neurologic exam:  MS - Resting with eyes closed, opens eyes to verbal stimuli, upon calling her name, Oriented to self(able to state her name, , age as 89, 89 then repeats her year of birth "I was born in 1931), oriented to place (states she is in the hospital because her sugars went up), partially oriented to time(able to state the year as , month as April, states May when asked what was the month before april). Minimal /limited verbal interaction, appears frustrated upon repeating instructions, limited attention span, inconsistently follows some simple commands.     CN - Pupils GIGI 2/2 pt resisting eye opening, R pupil 3.5mm with no reaction to light, pt legally blind, unable to even perceive light, GIGI L pupil, EOMs and VFF. Face appears grossly symmetrical.   Motor - Normal bulk/tone, Not following commands, noted some spontaneous movements (very little distal movements) B/L UE, barely wiggles B/L Toes. Unable to participate in formal strength testing. "[place]" noted.   Sens - LT  intact all  DTR's - GIGI.   Coord , Gait and station - GIGI and Gait assessment deferred during my encounter 2/2 pt is bed bound.     Pertinent labs/studies:                        15.9   7.30  )-----------( 129      ( 24 Mar 2025 06:16 )             46.5         133[L]  |  96  |  18  ----------------------------<  96  3.4[L]   |  31  |  0.90    Ca    8.3[L]      24 Mar 2025 06:16  Phos  2.5     03-24  Mg     1.9     03-24    TPro  5.7[L]  /  Alb  2.6[L]  /  TBili  0.9  /  DBili  x   /  AST  23  /  ALT  27  /  AlkPhos  105  -24      A1C with Estimated Average Glucose Result: 10.6

## 2025-03-24 NOTE — DIETITIAN INITIAL EVALUATION ADULT - PERTINENT MEDS FT
MEDICATIONS  (STANDING):  anastrozole 1 milliGRAM(s) Oral daily  apixaban 5 milliGRAM(s) Oral every 12 hours  atorvastatin 10 milliGRAM(s) Oral at bedtime  carvedilol 6.25 milliGRAM(s) Oral every 12 hours  ertapenem  IVPB 1000 milliGRAM(s) IV Intermittent every 24 hours  famotidine    Tablet 20 milliGRAM(s) Oral two times a day  fluticasone propionate/ salmeterol 100-50 MICROgram(s) Diskus 1 Dose(s) Inhalation two times a day  furosemide    Tablet 40 milliGRAM(s) Oral daily  hydrALAZINE 50 milliGRAM(s) Oral every 8 hours  insulin glargine Injectable (LANTUS) 8 Unit(s) SubCutaneous at bedtime  insulin lispro (ADMELOG) corrective regimen sliding scale   SubCutaneous three times a day before meals  insulin lispro (ADMELOG) corrective regimen sliding scale   SubCutaneous at bedtime  metFORMIN 500 milliGRAM(s) Oral two times a day  montelukast 10 milliGRAM(s) Oral daily  tiotropium 2.5 MICROgram(s) Inhaler 2 Puff(s) Inhalation daily    MEDICATIONS  (PRN):  acetaminophen     Tablet .. 650 milliGRAM(s) Oral every 6 hours PRN Temp greater or equal to 38C (100.4F), Mild Pain (1 - 3)  albuterol    90 MICROgram(s) HFA Inhaler 2 Puff(s) Inhalation every 6 hours PRN for bronchospasm  aluminum hydroxide/magnesium hydroxide/simethicone Suspension 30 milliLiter(s) Oral every 4 hours PRN Dyspepsia  melatonin 3 milliGRAM(s) Oral at bedtime PRN Insomnia  ondansetron Injectable 4 milliGRAM(s) IV Push every 8 hours PRN Nausea and/or Vomiting

## 2025-03-24 NOTE — DIETITIAN INITIAL EVALUATION ADULT - NS FNS DIET ORDER
Diet, Soft and Bite Sized:   Consistent Carbohydrate {No Snacks}  DASH/TLC {Sodium & Cholesterol Restricted}  No Egg  No Poultry (03-23-25 @ 13:15)

## 2025-03-24 NOTE — PROGRESS NOTE ADULT - ASSESSMENT
Patient is a 89y old  Female who is morbid obesity, is legally blind ambulates with walker, and PMH of HTN, HLD, COPD (never smoker, on 2L O2 at home), BIPAP at night , HFpEF (EF 55-60%), severe RV, Afib on Eliquis, Pulmonary HTN, frequent UTI's now sent in to the ER from her doctors office for evaluation of hyperglycemia. Collateral obtained from granddaughter Martin Churchill [phone]. Patient has a history of pre DM and her A1c on her usual visits has always been in the 5's and she didnot require any medication. She was not complaining of anything specifically but at the doctors appointment, the high blood sugar and general appearance prompted them to send the patient to the ER. On admission, she found to have no fever but positive Urine analysis. She has started on Ertapenem and the ID consult requested to assist with further evaluation and antibiotic management.     # UTI - h/o ESBL organism  # Hyperglycemia - better controlled     would recommend:    1. OOB to chair   2. Continue Ertapenem while inpatient and may change to oral Augmentin 875mg q 12hours on discharge to continue until 3/27/25  3. Management of Hyperglycemia as per Primary team    Attending Attestation:    Spent more than 35 minutes on total encounter, more than 50 % of the visit was spent counseling and/or coordinating care by the Attending physician.

## 2025-03-24 NOTE — PROGRESS NOTE ADULT - PROBLEM SELECTOR PLAN 1
- BG >500 in ED, h/o pre-DM, A1c 10.6  - c/w lantus 8u, metformin 500mg BID      Endo consulted Dr. Funk

## 2025-03-25 ENCOUNTER — TRANSCRIPTION ENCOUNTER (OUTPATIENT)
Age: 89
End: 2025-03-25

## 2025-03-25 VITALS
OXYGEN SATURATION: 100 % | TEMPERATURE: 98 F | SYSTOLIC BLOOD PRESSURE: 122 MMHG | HEART RATE: 86 BPM | RESPIRATION RATE: 18 BRPM | DIASTOLIC BLOOD PRESSURE: 83 MMHG

## 2025-03-25 LAB
24R-OH-CALCIDIOL SERPL-MCNC: 39.5 NG/ML — SIGNIFICANT CHANGE UP
CCP IGG SERPL-ACNC: <8 U/ML — SIGNIFICANT CHANGE UP
FOLATE SERPL-MCNC: >20 NG/ML — SIGNIFICANT CHANGE UP
GLUCOSE BLDC GLUCOMTR-MCNC: 129 MG/DL — HIGH (ref 70–99)
GLUCOSE BLDC GLUCOMTR-MCNC: 149 MG/DL — HIGH (ref 70–99)
HCYS SERPL-MCNC: 13.8 UMOL/L — SIGNIFICANT CHANGE UP
RF+CCP IGG SER-IMP: NEGATIVE — SIGNIFICANT CHANGE UP
T PALLIDUM AB TITR SER: NEGATIVE — SIGNIFICANT CHANGE UP
T4 FREE SERPL-MCNC: 1.4 NG/DL — SIGNIFICANT CHANGE UP (ref 0.9–1.7)
VIT B12 SERPL-MCNC: 649 PG/ML — SIGNIFICANT CHANGE UP (ref 232–1245)
VIT D25+D1,25 OH+D1,25 PNL SERPL-MCNC: 50.8 PG/ML — SIGNIFICANT CHANGE UP (ref 19.9–79.3)

## 2025-03-25 PROCEDURE — 82652 VIT D 1 25-DIHYDROXY: CPT

## 2025-03-25 PROCEDURE — 82607 VITAMIN B-12: CPT

## 2025-03-25 PROCEDURE — 84439 ASSAY OF FREE THYROXINE: CPT

## 2025-03-25 PROCEDURE — 86780 TREPONEMA PALLIDUM: CPT

## 2025-03-25 PROCEDURE — 96374 THER/PROPH/DIAG INJ IV PUSH: CPT

## 2025-03-25 PROCEDURE — 93005 ELECTROCARDIOGRAM TRACING: CPT

## 2025-03-25 PROCEDURE — 94640 AIRWAY INHALATION TREATMENT: CPT

## 2025-03-25 PROCEDURE — 80048 BASIC METABOLIC PNL TOTAL CA: CPT

## 2025-03-25 PROCEDURE — 82803 BLOOD GASES ANY COMBINATION: CPT

## 2025-03-25 PROCEDURE — 36415 COLL VENOUS BLD VENIPUNCTURE: CPT

## 2025-03-25 PROCEDURE — 87186 SC STD MICRODIL/AGAR DIL: CPT

## 2025-03-25 PROCEDURE — 87086 URINE CULTURE/COLONY COUNT: CPT

## 2025-03-25 PROCEDURE — 82009 KETONE BODYS QUAL: CPT

## 2025-03-25 PROCEDURE — 84484 ASSAY OF TROPONIN QUANT: CPT

## 2025-03-25 PROCEDURE — 86431 RHEUMATOID FACTOR QUANT: CPT

## 2025-03-25 PROCEDURE — 86038 ANTINUCLEAR ANTIBODIES: CPT

## 2025-03-25 PROCEDURE — 85652 RBC SED RATE AUTOMATED: CPT

## 2025-03-25 PROCEDURE — 83036 HEMOGLOBIN GLYCOSYLATED A1C: CPT

## 2025-03-25 PROCEDURE — 80053 COMPREHEN METABOLIC PANEL: CPT

## 2025-03-25 PROCEDURE — 84207 ASSAY OF VITAMIN B-6: CPT

## 2025-03-25 PROCEDURE — 86200 CCP ANTIBODY: CPT

## 2025-03-25 PROCEDURE — 83921 ORGANIC ACID SINGLE QUANT: CPT

## 2025-03-25 PROCEDURE — 84443 ASSAY THYROID STIM HORMONE: CPT

## 2025-03-25 PROCEDURE — 82746 ASSAY OF FOLIC ACID SERUM: CPT

## 2025-03-25 PROCEDURE — 87077 CULTURE AEROBIC IDENTIFY: CPT

## 2025-03-25 PROCEDURE — 85025 COMPLETE CBC W/AUTO DIFF WBC: CPT

## 2025-03-25 PROCEDURE — 83735 ASSAY OF MAGNESIUM: CPT

## 2025-03-25 PROCEDURE — 84100 ASSAY OF PHOSPHORUS: CPT

## 2025-03-25 PROCEDURE — 82962 GLUCOSE BLOOD TEST: CPT

## 2025-03-25 PROCEDURE — 83880 ASSAY OF NATRIURETIC PEPTIDE: CPT

## 2025-03-25 PROCEDURE — 81001 URINALYSIS AUTO W/SCOPE: CPT

## 2025-03-25 PROCEDURE — 86140 C-REACTIVE PROTEIN: CPT

## 2025-03-25 PROCEDURE — 83930 ASSAY OF BLOOD OSMOLALITY: CPT

## 2025-03-25 PROCEDURE — 82306 VITAMIN D 25 HYDROXY: CPT

## 2025-03-25 PROCEDURE — 94660 CPAP INITIATION&MGMT: CPT

## 2025-03-25 PROCEDURE — 99285 EMERGENCY DEPT VISIT HI MDM: CPT

## 2025-03-25 PROCEDURE — 71045 X-RAY EXAM CHEST 1 VIEW: CPT

## 2025-03-25 PROCEDURE — 83090 ASSAY OF HOMOCYSTEINE: CPT

## 2025-03-25 RX ORDER — ATORVASTATIN CALCIUM 80 MG/1
1 TABLET, FILM COATED ORAL
Qty: 0 | Refills: 0 | DISCHARGE
Start: 2025-03-25

## 2025-03-25 RX ORDER — AMOXICILLIN AND CLAVULANATE POTASSIUM 500; 125 MG/1; MG/1
875 TABLET, FILM COATED ORAL
Qty: 6 | Refills: 0
Start: 2025-03-25 | End: 2025-03-27

## 2025-03-25 RX ORDER — INSULIN GLARGINE-YFGN 100 [IU]/ML
10 INJECTION, SOLUTION SUBCUTANEOUS
Qty: 1 | Refills: 0
Start: 2025-03-25

## 2025-03-25 RX ORDER — METFORMIN HYDROCHLORIDE 850 MG/1
1 TABLET ORAL
Qty: 0 | Refills: 0 | DISCHARGE
Start: 2025-03-25

## 2025-03-25 RX ADMIN — Medication 50 MILLIGRAM(S): at 05:41

## 2025-03-25 RX ADMIN — CARVEDILOL 6.25 MILLIGRAM(S): 3.12 TABLET, FILM COATED ORAL at 05:41

## 2025-03-25 RX ADMIN — FUROSEMIDE 40 MILLIGRAM(S): 10 INJECTION INTRAMUSCULAR; INTRAVENOUS at 05:40

## 2025-03-25 RX ADMIN — TIOTROPIUM BROMIDE INHALATION SPRAY 2 PUFF(S): 3.12 SPRAY, METERED RESPIRATORY (INHALATION) at 12:06

## 2025-03-25 RX ADMIN — Medication 20 MILLIGRAM(S): at 05:41

## 2025-03-25 RX ADMIN — Medication 1 DOSE(S): at 12:06

## 2025-03-25 RX ADMIN — MONTELUKAST SODIUM 10 MILLIGRAM(S): 10 TABLET ORAL at 12:06

## 2025-03-25 RX ADMIN — METFORMIN HYDROCHLORIDE 500 MILLIGRAM(S): 850 TABLET ORAL at 05:40

## 2025-03-25 RX ADMIN — ANASTROZOLE 1 MILLIGRAM(S): 1 TABLET ORAL at 12:05

## 2025-03-25 RX ADMIN — APIXABAN 5 MILLIGRAM(S): 2.5 TABLET, FILM COATED ORAL at 05:40

## 2025-03-25 NOTE — DISCHARGE NOTE NURSING/CASE MANAGEMENT/SOCIAL WORK - PATIENT PORTAL LINK FT
You can access the FollowMyHealth Patient Portal offered by [facility] by registering at the following website: [email or web address]. By joining [facility]’s FollowMyHealth portal, you will also be able to view your health information using other applications (apps) compatible with our system.

## 2025-03-25 NOTE — DISCHARGE NOTE PROVIDER - PROVIDER TOKENS
PROVIDER:[TOKEN:[[id number]],FOLLOWUP:[1 week]],PROVIDER:[TOKEN:[[id number]],FOLLOWUP:[1 week]]

## 2025-03-25 NOTE — PROGRESS NOTE ADULT - PROBLEM SELECTOR PROBLEM 4
Acute metabolic encephalopathy
Atrial fibrillation
Acute metabolic encephalopathy

## 2025-03-25 NOTE — PROGRESS NOTE ADULT - PROBLEM SELECTOR PLAN 6
on atorvastatin at home  c/w home medications.
Patient on carvedilol, lasix and atorvastatin at home  c/w home medications

## 2025-03-25 NOTE — DISCHARGE NOTE PROVIDER - HOSPITAL COURSE
89 yo F, w/ morbid obesity, is legally blind ambulates with walker, with PMH of HTN, HLD, COPD (never smoker, on 2L O2 at home), BIPAP at night , HFpEF (EF 55-60%), severe RV, Afib on Eliquis, Pulmonary HTN, frequent UTI's presents from her doctors office with hyperglycemia. Patient admitted for uncontrolled hyperglycemia and UTI. Consulted neuro for ?worsening dementia and continuing on IV Abx.       Problem/Plan - 1:  ·  Problem: Hyperglycemia.   ·  Plan: - BG >500 in ED, h/o pre-DM, A1c 10.6  - c/w lantus 8u, metformin 500mg BID      Endo consulted Dr. uFnk.     Problem/Plan - 2:  ·  Problem: Acute UTI.   ·  Plan: h/o UTI in past, + confusion and elderly, h/o ESBL  - UA +  - will complete course of ertapenem 3/24     ID consulted Dr. Denton.     Problem/Plan - 3:  ·  Problem: COPD (chronic obstructive pulmonary disease).   ·  Plan: on trellegy at home  c/w home medications.     Problem/Plan - 4:  ·  Problem: Acute metabolic encephalopathy.   ·  Plan: Hx of dementia  AAOx2 in hospital  likely secondary to UTI  bladder scan negative  - TSH 1.92  f/u neuro labs    Neuro Dr. Brown consulted.     Problem/Plan - 5:  ·  Problem: Atrial fibrillation.   ·  Plan: Patient on eliquis and carvedilol at home  c/w home medications    Dr. Siu consulted.     Problem/Plan - 6:  ·  Problem: HLD (hyperlipidemia).   ·  Plan: on atorvastatin at home  c/w home medications.     Problem/Plan - 7:  ·  Problem: Chronic heart failure with preserved ejection fraction (HFpEF).   ·  Plan: on carvedilol, lasix and atorvastatin at home  c/w home medications.     Problem/Plan - 8:  ·  Problem: HTN (hypertension).   ·  Plan: on hydralazine carvedilol at home  c/w home medications.     Problem/Plan - 9:  ·  Problem: Prophylactic measure.   ·  Plan: DVT Ppx: Eliquis for Afib.     89 yo F, w/ morbid obesity, is legally blind ambulates with walker, with PMH of HTN, HLD, COPD (never smoker, on 2L O2 at home), BIPAP at night , HFpEF (EF 55-60%), severe RV, Afib on Eliquis, Pulmonary HTN, frequent UTI's presents from her doctors office with hyperglycemia. Patient admitted for uncontrolled, worsening dementia, hyperglycemia and UTI.   Patient found to have an of A1c 10.6. Endo consulted, reccs appreciated. For the A       Problem/Plan - 2:  ·  Problem: Acute UTI.   ·  Plan: h/o UTI in past, + confusion and elderly, h/o ESBL  - UA +  - will complete course of ertapenem 3/24     ID consulted Dr. Denton.     Problem/Plan - 3:  ·  Problem: COPD (chronic obstructive pulmonary disease).   ·  Plan: on trellegy at home  c/w home medications.     Problem/Plan - 4:  ·  Problem: Acute metabolic encephalopathy.   ·  Plan: Hx of dementia  AAOx2 in hospital  likely secondary to UTI  bladder scan negative  - TSH 1.92  f/u neuro labs    Neuro Dr. Brown consulted.     Problem/Plan - 5:  ·  Problem: Atrial fibrillation.   ·  Plan: Patient on eliquis and carvedilol at home  c/w home medications    Dr. Siu consulted.     Problem/Plan - 6:  ·  Problem: HLD (hyperlipidemia).   ·  Plan: on atorvastatin at home  c/w home medications.     Problem/Plan - 7:  ·  Problem: Chronic heart failure with preserved ejection fraction (HFpEF).   ·  Plan: on carvedilol, lasix and atorvastatin at home  c/w home medications.     Problem/Plan - 8:  ·  Problem: HTN (hypertension).   ·  Plan: on hydralazine carvedilol at home  c/w home medications.     Problem/Plan - 9:  ·  Problem: Prophylactic measure.   ·  Plan: DVT Ppx: Eliquis for Afib.     89 yo F, w/ morbid obesity, is legally blind ambulates with walker, with PMH of HTN, HLD, COPD (never smoker, on 2L O2 at home), BIPAP at night , HFpEF (EF 55-60%), severe RV, Afib on Eliquis, Pulmonary HTN, frequent UTI's presents from her doctors office with hyperglycemia. Patient admitted for uncontrolled, worsening dementia, hyperglycemia and UTI.   Patient found to have an of A1c 10.6. Endo consulted, reccs appreciated. For the Acute UTI, pt with a hx of ESBL, ID consulted, started on ertapenem until 3/25 then switched to Augmentin 875mg q8 until 3/27. Given patient's worsening mentation, an acute metabolic encephalopathic work up was done, TSH, Folate, B12, Homocysteine, was negative  Patient w/ hx of Afib, cardiology consulted, and continued on her home medication carvedilol and eliquis.  Patient w/ hx of Chronic heart failure with preserved ejection fraction, continued on carvedilol, lasix and atorvastatin at home. Pt w. hx of htn was continued on Hydralazine.       Given patient's improved clinical status and current hemodynamic stability, decision was made to discharge after discussing with attending physician.

## 2025-03-25 NOTE — DISCHARGE NOTE PROVIDER - NSDCCAREPROVSEEN_GEN_ALL_CORE_FT
Corrie, Mohsena F Asghar, Avani Benson, Cinda Becerra, Muna Boyd, Du Brown, Negin Mtz, Beckie De Jesus, Gómez Siu, Kristine Mathews, Dulce Funk, Dulce Lackey, Ayden Sotomayor

## 2025-03-25 NOTE — PROGRESS NOTE ADULT - PROBLEM SELECTOR PROBLEM 5
Atrial fibrillation
HLD (hyperlipidemia)

## 2025-03-25 NOTE — DISCHARGE NOTE NURSING/CASE MANAGEMENT/SOCIAL WORK - NSDCPEFALRISK_GEN_ALL_CORE
For information on Fall & Injury Prevention, visit: [email or web address] OR  [email or web address] OR  https://www.cdc.gov/steadi/patient.html

## 2025-03-25 NOTE — DISCHARGE NOTE NURSING/CASE MANAGEMENT/SOCIAL WORK - FINANCIAL ASSISTANCE
[facility] provides services at a reduced cost to those who are determined to be eligible through [facility]’s financial assistance program. Information regarding [facility]’s financial assistance program can be found by going to [email or web address] or by calling [phone].

## 2025-03-25 NOTE — PROGRESS NOTE ADULT - PROBLEM SELECTOR PLAN 5
Patient on eliquis and carvedilol at home  c/w home medications    Dr. Siu consulted
Patient on eliquis and carvedilol at home  c/w home medications    Dr. Siu consulted
Patient on atorvastatin at home  c/w home medications
Patient on eliquis and carvedilol at home  c/w home medications    Dr. Siu consulted
Patient on eliquis and carvedilol at home  c/w home medications    Dr. Siu consulted

## 2025-03-25 NOTE — PROGRESS NOTE ADULT - PROBLEM SELECTOR PLAN 8
on hydralazine carvedilol at home  c/w home medications
eliquis

## 2025-03-25 NOTE — DISCHARGE NOTE NURSING/CASE MANAGEMENT/SOCIAL WORK - NSDCVIVACCINE_GEN_ALL_CORE_FT
influenza, injectable, quadrivalent, preservative free; 27-Oct-2017 14:11; Brandy Joaqiun (RN); [employer]; [id number]; IntraMuscular; Deltoid Left.; 0.5 milliLiter(s); VIS (VIS Published: 07-Aug-2015, VIS Presented: 27-Oct-2017);   influenza, high-dose, quadrivalent; 18-Nov-2021 06:24; Yemi Santana (WINTER); [employer]; [id number] (Exp. Date: 30-Jun-2022); IntraMuscular; Deltoid Left.; 0.7 milliLiter(s); VIS (VIS Published: 06-Aug-2021, VIS Presented: 18-Nov-2021);

## 2025-03-25 NOTE — PROGRESS NOTE ADULT - PROBLEM SELECTOR PLAN 3
Patient on trellegy at home  c/w home medications
on trellegy at home  c/w home medications.

## 2025-03-25 NOTE — PROGRESS NOTE ADULT - SUBJECTIVE AND OBJECTIVE BOX
Date of Service 03-25-25 @ 10:12    CHIEF COMPLAINT:Patient is a 89y old  Female who presents with a chief complaint of Hyperglycemia, UTI .Pt appears comfortable.    	  REVIEW OF SYSTEMS:  CONSTITUTIONAL: No fever, weight loss, or fatigue  EYES: No eye pain, visual disturbances, or discharge  ENT:  No difficulty hearing, tinnitus, vertigo; No sinus or throat pain  NECK: No pain or stiffness  RESPIRATORY: No cough, wheezing, chills or hemoptysis; No Shortness of Breath  CARDIOVASCULAR: No chest pain, palpitations, passing out, dizziness, or leg swelling  GASTROINTESTINAL: No abdominal or epigastric pain. No nausea, vomiting, or hematemesis; No diarrhea or constipation. No melena or hematochezia.  GENITOURINARY: No dysuria, frequency, hematuria, or incontinence  NEUROLOGICAL: No headaches, memory loss, loss of strength, numbness, or tremors  SKIN: No itching, burning, rashes, or lesions   LYMPH Nodes: No enlarged glands  ENDOCRINE: No heat or cold intolerance; No hair loss  MUSCULOSKELETAL: No joint pain or swelling; No muscle, back, or extremity pain  PSYCHIATRIC: No depression, anxiety, mood swings, or difficulty sleeping  HEME/LYMPH: No easy bruising, or bleeding gums  ALLERGY AND IMMUNOLOGIC: No hives or eczema	        PHYSICAL EXAM:  T(C): 36.6 (03-25-25 @ 05:20), Max: 36.6 (03-25-25 @ 05:20)  HR: 86 (03-25-25 @ 05:20) (68 - 86)  BP: 122/83 (03-25-25 @ 05:20) (101/69 - 122/83)  RR: 18 (03-25-25 @ 05:20) (16 - 18)  SpO2: 100% (03-25-25 @ 05:20) (95% - 100%)  Wt(kg): --  I&O's Summary    24 Mar 2025 07:01  -  25 Mar 2025 07:00  --------------------------------------------------------  IN: 0 mL / OUT: 500 mL / NET: -500 mL        Appearance: Normal	  HEENT:   Normal oral mucosa, PERRL, EOMI	  Lymphatic: No lymphadenopathy  Cardiovascular: Normal S1 S2, No JVD, No murmurs, No edema  Respiratory: Lungs clear to auscultation	  Psychiatry: A & O x 3, Mood & affect appropriate  Gastrointestinal:  Soft, Non-tender, + BS	  Skin: No rashes, No ecchymoses, No cyanosis	  Neurologic: Non-focal  Extremities: Normal range of motion, No clubbing, cyanosis or edema  Vascular: Peripheral pulses palpable 2+ bilaterally    MEDICATIONS  (STANDING):  anastrozole 1 milliGRAM(s) Oral daily  apixaban 5 milliGRAM(s) Oral every 12 hours  atorvastatin 10 milliGRAM(s) Oral at bedtime  carvedilol 6.25 milliGRAM(s) Oral every 12 hours  famotidine    Tablet 20 milliGRAM(s) Oral two times a day  fluticasone propionate/ salmeterol 100-50 MICROgram(s) Diskus 1 Dose(s) Inhalation two times a day  furosemide    Tablet 40 milliGRAM(s) Oral daily  hydrALAZINE 50 milliGRAM(s) Oral every 8 hours  insulin glargine Injectable (LANTUS) 8 Unit(s) SubCutaneous at bedtime  insulin lispro (ADMELOG) corrective regimen sliding scale   SubCutaneous three times a day before meals  insulin lispro (ADMELOG) corrective regimen sliding scale   SubCutaneous at bedtime  metFORMIN 500 milliGRAM(s) Oral two times a day  montelukast 10 milliGRAM(s) Oral daily  tiotropium 2.5 MICROgram(s) Inhaler 2 Puff(s) Inhalation daily      	  	  LABS:	 	                        15.9   7.30  )-----------( 129      ( 24 Mar 2025 06:16 )             46.5     03-24    133[L]  |  96  |  18  ----------------------------<  96  3.4[L]   |  31  |  0.90    Ca    8.3[L]      24 Mar 2025 06:16  Phos  2.5     03-24  Mg     1.9     03-24    TPro  5.7[L]  /  Alb  2.6[L]  /  TBili  0.9  /  DBili  x   /  AST  23  /  ALT  27  /  AlkPhos  105  03-24      TSH: Thyroid Stimulating Hormone, Serum: 1.92 uU/mL (03-24 @ 06:16)

## 2025-03-25 NOTE — PROGRESS NOTE ADULT - SUBJECTIVE AND OBJECTIVE BOX
Interval Events:      Allergies    losartan (Angioedema)    Intolerances    Chicken (Stomach Upset)    Endocrine/Metabolic Medications:  atorvastatin 10 milliGRAM(s) Oral at bedtime  insulin glargine Injectable (LANTUS) 8 Unit(s) SubCutaneous at bedtime  insulin lispro (ADMELOG) corrective regimen sliding scale   SubCutaneous three times a day before meals  insulin lispro (ADMELOG) corrective regimen sliding scale   SubCutaneous at bedtime  metFORMIN 500 milliGRAM(s) Oral two times a day      Vital Signs Last 24 Hrs  T(C): 36.6 (25 Mar 2025 05:20), Max: 36.6 (25 Mar 2025 05:20)  T(F): 97.9 (25 Mar 2025 05:20), Max: 97.9 (25 Mar 2025 05:20)  HR: 86 (25 Mar 2025 05:20) (68 - 86)  BP: 122/83 (25 Mar 2025 05:20) (101/69 - 122/83)  BP(mean): 80 (24 Mar 2025 18:51) (80 - 88)  RR: 18 (25 Mar 2025 05:20) (16 - 18)  SpO2: 100% (25 Mar 2025 05:20) (95% - 100%)    Parameters below as of 25 Mar 2025 05:20  Patient On (Oxygen Delivery Method): BiPAP/CPAP          PHYSICAL EXAM  All physical exam findings normal, except those marked:  General:	Alert, active, cooperative, NAD, well hydrated  .		[] Abnormal:  Neck		Normal: supple, no cervical adenopathy, no palpable thyroid  .		[] Abnormal:  Cardiovascular	Normal: regular rate, normal S1, S2, no murmurs  .		[] Abnormal:  Respiratory	Normal: no chest wall deformity, normal respiratory pattern, CTA B/L  .		[] Abnormal:  Abdominal	Normal: soft, ND, NT, bowel sounds present, no masses, no organomegaly  .		[] Abnormal:  		Normal normal genitalia, testes descended, circumcised/uncircumcised  .		Amy stage:			Breast amy:  .		Menstrual history:  .		[] Abnormal:  Extremities	Normal: FROM x4  .		[] Abnormal:  Skin		Normal: intact and not indurated, no rash, no acanthosis nigricans  .		[] Abnormal:  Neurologic	Normal: grossly intact  .		[] Abnormal:    LABS        CAPILLARY BLOOD GLUCOSE      POCT Blood Glucose.: 129 mg/dL (25 Mar 2025 07:47)  POCT Blood Glucose.: 137 mg/dL (24 Mar 2025 21:27)  POCT Blood Glucose.: 146 mg/dL (24 Mar 2025 16:55)  POCT Blood Glucose.: 88 mg/dL (24 Mar 2025 11:27)        Assesment/plan       Interval Events:  pt in nad    Allergies    losartan (Angioedema)    Intolerances    Chicken (Stomach Upset)    Endocrine/Metabolic Medications:  atorvastatin 10 milliGRAM(s) Oral at bedtime  insulin glargine Injectable (LANTUS) 8 Unit(s) SubCutaneous at bedtime  insulin lispro (ADMELOG) corrective regimen sliding scale   SubCutaneous three times a day before meals  insulin lispro (ADMELOG) corrective regimen sliding scale   SubCutaneous at bedtime  metFORMIN 500 milliGRAM(s) Oral two times a day      Vital Signs Last 24 Hrs  T(C): 36.6 (25 Mar 2025 05:20), Max: 36.6 (25 Mar 2025 05:20)  T(F): 97.9 (25 Mar 2025 05:20), Max: 97.9 (25 Mar 2025 05:20)  HR: 86 (25 Mar 2025 05:20) (68 - 86)  BP: 122/83 (25 Mar 2025 05:20) (101/69 - 122/83)  BP(mean): 80 (24 Mar 2025 18:51) (80 - 88)  RR: 18 (25 Mar 2025 05:20) (16 - 18)  SpO2: 100% (25 Mar 2025 05:20) (95% - 100%)    Parameters below as of 25 Mar 2025 05:20  Patient On (Oxygen Delivery Method): BiPAP/CPAP          PHYSICAL EXAM  All physical exam findings normal, except those marked:  General:	Alert, active, cooperative, NAD, well hydrated  .		[] Abnormal:  Neck		Normal: supple, no cervical adenopathy, no palpable thyroid  .		[] Abnormal:  Cardiovascular	Normal: regular rate, normal S1, S2, no murmurs  .		[] Abnormal:  Respiratory	Normal: no chest wall deformity, normal respiratory pattern, CTA B/L  .		[] Abnormal:  Abdominal	Normal: soft, ND, NT, bowel sounds present, no masses, no organomegaly  .		[] Abnormal:  		Normal normal genitalia, testes descended, circumcised/uncircumcised  .		Amy stage:			Breast amy:  .		Menstrual history:  .		[] Abnormal:  Extremities	Normal: FROM x4  .		[] Abnormal:  Skin		Normal: intact and not indurated, no rash, no acanthosis nigricans  .		[] Abnormal:  Neurologic	Normal: grossly intact  .		[] Abnormal:    LABS        CAPILLARY BLOOD GLUCOSE      POCT Blood Glucose.: 129 mg/dL (25 Mar 2025 07:47)  POCT Blood Glucose.: 137 mg/dL (24 Mar 2025 21:27)  POCT Blood Glucose.: 146 mg/dL (24 Mar 2025 16:55)  POCT Blood Glucose.: 88 mg/dL (24 Mar 2025 11:27)        Assesment/plan    89 yo F, w/ morbid obesity, is legally blind ambulates with walker, with PMH of HTN, HLD, COPD (never smoker, on 2L O2 at home), BIPAP at night , HFpEF (EF 55-60%), severe RV, Afib on Eliquis, Pulmonary HTN, frequent UTI's presents from her doctors office with hyperglycemia.   Endocrine consulted for dm management. Pt has hx of pre dm - on no meds.          Problem/Recommendation - 1:  ·  Problem: Hyperglycemia.   ·  Recommendation: new onset dm with severe hyperglycemia  now toghtly controlled   hold lantus 8 units- restart if fsg > 200  consider januvia as out pt  d/c admelog 4 ac tid  cont metformin   dm teaching to family- pt legally blind  d/c home on lantus and metformin if leaving today   canes can be done as out pt  fsg ac and hs.

## 2025-03-25 NOTE — PROGRESS NOTE ADULT - PROBLEM SELECTOR PLAN 2
history of UTI in past  + confusion and elderly  UA +,   Hx of ESBL  started ertapenem    ID consulted Dr. Denton
h/o UTI in past, + confusion and elderly, h/o ESBL  - UA +  - will complete course of ertapenem 3/24     ID consulted Dr. Denton
h/o UTI in past, + confusion and elderly, h/o ESBL  - UA +  - will complete course of ertapenem 3/24     ID consulted Dr. Denton
history of UTI in past  not complaining of specific symptoms  UA +ve and hyperglycemic, general malaise- will treat with ertapenem  ID consulted Amin
history of UTI in past  + confusion and elderly  UA +,   Hx of ESBL  started ertapenem    ID consulted Dr. Denton

## 2025-03-25 NOTE — PROGRESS NOTE ADULT - PROBLEM SELECTOR PLAN 7
on carvedilol, lasix and atorvastatin at home  c/w home medications.
Patient on hydralazine carvedilol at home  c/w home medications
on carvedilol, lasix and atorvastatin at home  c/w home medications.

## 2025-03-25 NOTE — DISCHARGE NOTE PROVIDER - CARE PROVIDER_API CALL
Ayden Bianchi  Internal Medicine  [address] [place] [address], [address]  [place] [address]  Phone: [phone]  Fax: [phone]  Follow Up Time: 1 week    Kristine Siu  Cardiology  [address] [place] [address]  Phone: [phone]  Fax: [phone]  Follow Up Time: 1 week

## 2025-03-25 NOTE — PROGRESS NOTE ADULT - SUBJECTIVE AND OBJECTIVE BOX
Progress Note discussed with attending    MS TEAMS AUTHOR OF THIS NOTE TILL 5:00 PM  PLEASE CONTACT ON CALL TEAM:  - On Call Team (Please refer to Cassy) FROM 5:00 PM - 8:30PM  - Nightfloat Team FROM 8:30 -7:30 AM    CHIEF COMPLAINT & BRIEF HOSPITAL COURSE:    INTERVAL HPI/OVERNIGHT EVENTS:   MEDICATIONS  (STANDING):  anastrozole 1 milliGRAM(s) Oral daily  apixaban 5 milliGRAM(s) Oral every 12 hours  atorvastatin 10 milliGRAM(s) Oral at bedtime  carvedilol 6.25 milliGRAM(s) Oral every 12 hours  famotidine    Tablet 20 milliGRAM(s) Oral two times a day  fluticasone propionate/ salmeterol 100-50 MICROgram(s) Diskus 1 Dose(s) Inhalation two times a day  furosemide    Tablet 40 milliGRAM(s) Oral daily  hydrALAZINE 50 milliGRAM(s) Oral every 8 hours  insulin glargine Injectable (LANTUS) 8 Unit(s) SubCutaneous at bedtime  insulin lispro (ADMELOG) corrective regimen sliding scale   SubCutaneous three times a day before meals  insulin lispro (ADMELOG) corrective regimen sliding scale   SubCutaneous at bedtime  metFORMIN 500 milliGRAM(s) Oral two times a day  montelukast 10 milliGRAM(s) Oral daily  tiotropium 2.5 MICROgram(s) Inhaler 2 Puff(s) Inhalation daily    MEDICATIONS  (PRN):  acetaminophen     Tablet .. 650 milliGRAM(s) Oral every 6 hours PRN Temp greater or equal to 38C (100.4F), Mild Pain (1 - 3)  albuterol    90 MICROgram(s) HFA Inhaler 2 Puff(s) Inhalation every 6 hours PRN for bronchospasm  aluminum hydroxide/magnesium hydroxide/simethicone Suspension 30 milliLiter(s) Oral every 4 hours PRN Dyspepsia  melatonin 3 milliGRAM(s) Oral at bedtime PRN Insomnia  ondansetron Injectable 4 milliGRAM(s) IV Push every 8 hours PRN Nausea and/or Vomiting      REVIEW OF SYSTEMS:  CONSTITUTIONAL: No fever, weight loss, or fatigue  RESPIRATORY: No shortness of breath  CARDIOVASCULAR: No chest pain  GASTROINTESTINAL: No abdominal pain.  GENITOURINARY: No dysuria  NEUROLOGICAL: No headaches  SKIN: No itching, burning, rashes    Vital Signs Last 24 Hrs  T(C): 36.6 (25 Mar 2025 05:20), Max: 36.6 (25 Mar 2025 05:20)  T(F): 97.9 (25 Mar 2025 05:20), Max: 97.9 (25 Mar 2025 05:20)  HR: 86 (25 Mar 2025 05:20) (68 - 86)  BP: 122/83 (25 Mar 2025 05:20) (101/69 - 122/83)  BP(mean): 80 (24 Mar 2025 18:51) (80 - 88)  RR: 18 (25 Mar 2025 05:20) (16 - 18)  SpO2: 100% (25 Mar 2025 05:20) (95% - 100%)    Parameters below as of 25 Mar 2025 05:20  Patient On (Oxygen Delivery Method): BiPAP/CPAP        PHYSICAL EXAMINATION:  GENERAL: NAD, well built  HEAD:  Atraumatic, Normocephalic  EYES:  conjunctiva and sclera clear  CHEST/LUNG: Clear to auscultation. No rales, rhonchi, wheezing, or rubs  HEART: Regular rate and rhythm; No murmurs, rubs, or gallops  ABDOMEN: Soft, Nontender, Nondistended; Bowel sounds present  NERVOUS SYSTEM:  Alert & Oriented X3,    EXTREMITIES:  2+ Peripheral Pulses, No clubbing, cyanosis, or edema  SKIN: warm dry                          15.9   7.30  )-----------( 129      ( 24 Mar 2025 06:16 )             46.5     03-24    133[L]  |  96  |  18  ----------------------------<  96  3.4[L]   |  31  |  0.90    Ca    8.3[L]      24 Mar 2025 06:16  Phos  2.5     03-24  Mg     1.9     03-24    TPro  5.7[L]  /  Alb  2.6[L]  /  TBili  0.9  /  DBili  x   /  AST  23  /  ALT  27  /  AlkPhos  105  03-24    LIVER FUNCTIONS - ( 24 Mar 2025 06:16 )  Alb: 2.6 g/dL / Pro: 5.7 g/dL / ALK PHOS: 105 U/L / ALT: 27 U/L DA / AST: 23 U/L / GGT: x                   CAPILLARY BLOOD GLUCOSE      RADIOLOGY & ADDITIONAL TESTS:                   Progress Note discussed with attending    MS TEAMS AUTHOR OF THIS NOTE TILL 5:00 PM  PLEASE CONTACT ON CALL TEAM:  - On Call Team (Please refer to Cassy) FROM 5:00 PM - 8:30PM  - Nightfloat Team FROM 8:30 -7:30 AM    CHIEF COMPLAINT & BRIEF HOSPITAL COURSE: patient was seen and examined at bedside, no acute events overnight, patient reports no complains.     INTERVAL HPI/OVERNIGHT EVENTS:   MEDICATIONS  (STANDING):  anastrozole 1 milliGRAM(s) Oral daily  apixaban 5 milliGRAM(s) Oral every 12 hours  atorvastatin 10 milliGRAM(s) Oral at bedtime  carvedilol 6.25 milliGRAM(s) Oral every 12 hours  famotidine    Tablet 20 milliGRAM(s) Oral two times a day  fluticasone propionate/ salmeterol 100-50 MICROgram(s) Diskus 1 Dose(s) Inhalation two times a day  furosemide    Tablet 40 milliGRAM(s) Oral daily  hydrALAZINE 50 milliGRAM(s) Oral every 8 hours  insulin glargine Injectable (LANTUS) 8 Unit(s) SubCutaneous at bedtime  insulin lispro (ADMELOG) corrective regimen sliding scale   SubCutaneous three times a day before meals  insulin lispro (ADMELOG) corrective regimen sliding scale   SubCutaneous at bedtime  metFORMIN 500 milliGRAM(s) Oral two times a day  montelukast 10 milliGRAM(s) Oral daily  tiotropium 2.5 MICROgram(s) Inhaler 2 Puff(s) Inhalation daily    MEDICATIONS  (PRN):  acetaminophen     Tablet .. 650 milliGRAM(s) Oral every 6 hours PRN Temp greater or equal to 38C (100.4F), Mild Pain (1 - 3)  albuterol    90 MICROgram(s) HFA Inhaler 2 Puff(s) Inhalation every 6 hours PRN for bronchospasm  aluminum hydroxide/magnesium hydroxide/simethicone Suspension 30 milliLiter(s) Oral every 4 hours PRN Dyspepsia  melatonin 3 milliGRAM(s) Oral at bedtime PRN Insomnia  ondansetron Injectable 4 milliGRAM(s) IV Push every 8 hours PRN Nausea and/or Vomiting      REVIEW OF SYSTEMS:  CONSTITUTIONAL: No fever, weight loss, or fatigue  RESPIRATORY: No shortness of breath  CARDIOVASCULAR: No chest pain  GASTROINTESTINAL: No abdominal pain.  GENITOURINARY: No dysuria  NEUROLOGICAL: No headaches  SKIN: No itching, burning, rashes    Vital Signs Last 24 Hrs  T(C): 36.6 (25 Mar 2025 05:20), Max: 36.6 (25 Mar 2025 05:20)  T(F): 97.9 (25 Mar 2025 05:20), Max: 97.9 (25 Mar 2025 05:20)  HR: 86 (25 Mar 2025 05:20) (68 - 86)  BP: 122/83 (25 Mar 2025 05:20) (101/69 - 122/83)  BP(mean): 80 (24 Mar 2025 18:51) (80 - 88)  RR: 18 (25 Mar 2025 05:20) (16 - 18)  SpO2: 100% (25 Mar 2025 05:20) (95% - 100%)    Parameters below as of 25 Mar 2025 05:20  Patient On (Oxygen Delivery Method): BiPAP/CPAP        PHYSICAL EXAMINATION:  GENERAL: NAD, morbidly obese  CHEST/LUNG: Clear to auscultation  HEART: Regular rate and rhythm; No murmurs, rubs, or gallops  ABDOMEN: Soft, Nontender, Nondistended; Bowel sounds present  NERVOUS SYSTEM:  Alert & Oriented X3,    EXTREMITIES:  2+ Peripheral Pulses, No clubbing, cyanosis, or edema  SKIN: warm dry                          15.9   7.30  )-----------( 129      ( 24 Mar 2025 06:16 )             46.5     03-24    133[L]  |  96  |  18  ----------------------------<  96  3.4[L]   |  31  |  0.90    Ca    8.3[L]      24 Mar 2025 06:16  Phos  2.5     03-24  Mg     1.9     03-24    TPro  5.7[L]  /  Alb  2.6[L]  /  TBili  0.9  /  DBili  x   /  AST  23  /  ALT  27  /  AlkPhos  105  03-24    LIVER FUNCTIONS - ( 24 Mar 2025 06:16 )  Alb: 2.6 g/dL / Pro: 5.7 g/dL / ALK PHOS: 105 U/L / ALT: 27 U/L DA / AST: 23 U/L / GGT: x                   CAPILLARY BLOOD GLUCOSE      RADIOLOGY & ADDITIONAL TESTS:

## 2025-03-25 NOTE — PROGRESS NOTE ADULT - PROBLEM SELECTOR PLAN 9
DVT Ppx: Eliquis for Afib
DVT Ppx: Eliquis for Afib  REPLACE K
DVT Ppx: Eliquis for Afib
DVT Ppx: Eliquis for Afib

## 2025-03-25 NOTE — DISCHARGE NOTE PROVIDER - NSDCCPCAREPLAN_GEN_ALL_CORE_FT
PRINCIPAL DISCHARGE DIAGNOSIS  Diagnosis: Hyperglycemia  Assessment and Plan of Treatment: You have a history of diabetes.   Your HbA1c was XXXX during this admission.  You need to continue monitoring your blood sugar levels closely and maintain healthy lifestyle by eating healthy diabetic regimen, weight loss and exercise regularly as tolerated.  Please continue to take your medications as prescribed.   Please follow up with your PCP/Endocrinologist within a week of discharge.        SECONDARY DISCHARGE DIAGNOSES  Diagnosis: Acute UTI  Assessment and Plan of Treatment: You were found to have an urinary tract infection. Your initial labs showed positive urinalysis, positive Urine culture, and elevated white blood cells.  Urinary tract infections can cause weakness, confusion, or spread to other organ systems in the body.  You were started on IV antibiotics in the hospital with the goal of clearing this infection then transitioned to oral antibiotics. Please take medications as prescribed and follow up with your PCP in a week from discharge.      Diagnosis: Non-ST elevation MI (NSTEMI)  Assessment and Plan of Treatment: A non-ST-elevation myocardial infarction (NSTEMI) is a type of heart attack that usually happens when your heart's need for oxygen can't be met. This condition gets its name because it doesn't have an easily identifiable electrical pattern (ST elevation) like the other main types of heart attacks. Your EKG showed some wave changes and your cardiac enzymes were elevated. An echocardiogram was performed as part of your work up and you were seen by cardiologist. You were managed with IV Anticoagulation initially as per protocol. You are being managed medically with ASPIRIN, ATORVASTATIN, AND METOPROLOL. PLEASE CONTINUE TO TAKE THESE MEDICATIONS AS PRESCRIBED and follow up with your PCP in a week from discharge.      Diagnosis: Atrial fibrillation  Assessment and Plan of Treatment: You were diagnosed with atrial fibrillation which is an irregular and often very rapid heart rhythm (arrhythmia) that can lead to blood clots in the heart. A-fib increases the risk of stroke, heart failure and other heart-related complications. During atrial fibrillation, the heart's upper chambers (the atria) beat chaotically and irregularly out of sync with the lower chambers (the ventricles) of the heart. For many people, A-fib may have no symptoms. However, A-fib may cause a fast, pounding heartbeat (palpitations), shortness of breath or weakness. Your EKG showed XXXXX, Echo showed no significant changes XXXXX dyastolic disfunction, systolic dysfunction, Stress test showed XXXX, PT XXX INR XXX.   Please continue to take Eliquis, Metoprolol, Amiodarone. XXXX as prescribed. No changes have been made to your regimen.   Please follow up with your PCP and Cardiologist in 1 week from discharge      Diagnosis: COPD (chronic obstructive pulmonary disease)  Assessment and Plan of Treatment: You came to the hospital with worsening shortness of breath, increased sputum production and cough. You were admitted for COPD exacerbation. Your Chest X-ray showed XXXXX. You were treated with ZITHROMAX, ROCEPHIN, IV STEROIDS THEN TRANSITIONED TO ORAL STEROIDS, SYMBICORT, ALBUTEROL, SPIRIVA wit subsequent improvement of symptoms. Please take medications as prescribed and follow up with your PCP and Pulmonologist in 1 week from discharge to adjust medications as needed.      Diagnosis: HTN (hypertension)  Assessment and Plan of Treatment: You have a history of Hypertension. XXXX You have been diagnosed with Hypertension.   On this admission, your Blood Pressure was adequately controlled with XXXXX  Your blood pressure target is 120-140/80-90, maintain healthy lifestyle, low salt diet, avoid fatty food, weight loss, exercise regularly or stay active as tolerated 30 mins X 3 times per week.  Notify your doctor if you have any of the following symptoms:   (Dizziness, Lightheadedness, Blurry vision, Headache, Chest pain, Shortness of breath.)  Please continue taking your home medications and follow-up with your PCP in 1 week from discharge to adjust medications as needed.      Diagnosis: Chronic heart failure with preserved ejection fraction (HFpEF)  Assessment and Plan of Treatment:     Diagnosis: HLD (hyperlipidemia)  Assessment and Plan of Treatment:      PRINCIPAL DISCHARGE DIAGNOSIS  Diagnosis: Hyperglycemia  Assessment and Plan of Treatment: You were found to be hyperglycemic during this admission.   Your HbA1c was 10.6. An endocrinologist was consulted and recommendations were appreciated.   You need to continue monitoring your blood sugar levels closely and maintain healthy lifestyle by eating healthy diabetic regimen, weight loss and exercise regularly as tolerated.  Please continue to take Lantus 8 Units and Metformin 500mg twice/day  Please follow up with your PCP/Endocrinologist within a week of discharge.        SECONDARY DISCHARGE DIAGNOSES  Diagnosis: Acute UTI  Assessment and Plan of Treatment: You were found to have an urinary tract infection. Your initial labs showed positive urinalysis.  Urinary tract infections can cause weakness, confusion, or spread to other organ systems in the body.  You were started on IV antibiotics in the hospital with the goal of clearing this infection then transitioned to oral antibiotics. You were started on Ertapenem and switched to Augmentin on 3/25. Please take Augmentin 875mg twice/day until 3/27. Please follow up with your PCP within one week of this discharge.       Diagnosis: Atrial fibrillation  Assessment and Plan of Treatment: You have a history of  atrial fibrillation which is an irregular and often very rapid heart rhythm (arrhythmia) that can lead to blood clots in the heart. A-fib increases the risk of stroke, heart failure and other heart-related complications. During atrial fibrillation, the heart's upper chambers (the atria) beat chaotically and irregularly out of sync with the lower chambers (the ventricles) of the heart. For many people, A-fib may have no symptoms. However, A-fib may cause a fast, pounding heartbeat (palpitations), shortness of breath or weakness.  During this admision you were evaluated by a cardiologist who recommened to continue on your home medication Carvedilol and Eliquis.   Please continue to take Eliquis, and carvedilol as prescribed.   Please follow up with your PCP and Cardiologist in 1 week from discharge      Diagnosis: HTN (hypertension)  Assessment and Plan of Treatment: You have a history of Hypertension.  On this admission, your Blood Pressure was adequately controlled with your home medication  Your blood pressure target is 120-140/80-90, maintain healthy lifestyle, low salt diet, avoid fatty food, weight loss, exercise regularly or stay active as tolerated 30 mins X 3 times per week.  Notify your doctor if you have any of the following symptoms:   (Dizziness, Lightheadedness, Blurry vision, Headache, Chest pain, Shortness of breath.)  Please continue taking your home medications, hydralazine and carvedilol, and follow-up with your PCP in 1 week from discharge to adjust medications as needed.      Diagnosis: Chronic heart failure with preserved ejection fraction (HFpEF)  Assessment and Plan of Treatment: You have history of CHF (congestive heart failure) with preserved ejection fraction. During this admission, you were not on any exacerbation. You were evaluated by a cardiologist who recommended to continue on your home medication; carvedilol, lasix and atorvastatin.  Please weigh yourself daily and If you gain 3lbs in 3 days, or 5lbs in a week and /or have any swelling or increased swelling in your feet, ankles, and/or stomach call your Health Care Provider.  Do not eat or drink foods containing more than 2000 mg of salt (sodium) in your diet every day and limit fluids to 800cc to 1000 cc per day. Please take your medication as prescribed and follow up with your PCP/Cardiologist in 1 week from discharge to adjust medications as needed.      Diagnosis: HLD (hyperlipidemia)  Assessment and Plan of Treatment: You have history of Hyperlipidemia. On this admission you were found to have abnormal high lipid profile.  Please take your medication as prescribed. Maintain healthy lifestyle, low fat diet, exercise regularly and check your lipid levels routinely.   Please follow up with your PCP in 1 week from discharge.

## 2025-03-25 NOTE — PROGRESS NOTE ADULT - PROBLEM SELECTOR PLAN 4
Hx of dementia  AAOx2 in hospital  likely secondary to UTI  bladder scan negative  - TSH 1.92  f/u neuro labs    Neuro Dr. Brown consulted
Hx of dementia  AAOx2 in hospital  likely secondary to UTI  bladder scan negative  f/u TSH, B12, folate    Neuro Dr. Brown consulted
Hx of dementia  AAOx2 in hospital  likely secondary to UTI  bladder scan negative  f/u TSH, B12, folate    Neuro Dr. Brown consulted
Patient on eliquis and carvedilol at home  c/w home medications
Hx of dementia  AAOx2 in hospital  likely secondary to UTI  bladder scan negative  - TSH 1.92  f/u neuro labs    Neuro Dr. Brown consulted

## 2025-03-25 NOTE — PROGRESS NOTE ADULT - PROBLEM SELECTOR PROBLEM 7
Chronic heart failure with preserved ejection fraction (HFpEF)
HTN (hypertension)
Chronic heart failure with preserved ejection fraction (HFpEF)

## 2025-03-25 NOTE — PROGRESS NOTE ADULT - PROVIDER SPECIALTY LIST ADULT
Cardiology
Endocrinology
Endocrinology
Internal Medicine
Cardiology
Endocrinology
Infectious Disease
Infectious Disease
Neurology
Internal Medicine
Cardiology
Internal Medicine

## 2025-03-25 NOTE — PROGRESS NOTE ADULT - REASON FOR ADMISSION
Hyperglycemia, UTI

## 2025-03-25 NOTE — PROGRESS NOTE ADULT - PROBLEM SELECTOR PROBLEM 6
Chronic heart failure with preserved ejection fraction (HFpEF)
HLD (hyperlipidemia)

## 2025-03-25 NOTE — PROGRESS NOTE ADULT - ASSESSMENT
89 yo F, w/ morbid obesity, is legally blind ambulates with walker, with PMH of HTN, HLD, COPD (never smoker, on 2L O2 at home), BIPAP at night , HFpEF (EF 55-60%), severe RV, chronic Afib on Eliquis, Pulmonary HTN, frequent UTI's presents from her doctors office with hyperglycemia and UTI.  1.UTI-ABX as per ID..  2.Chronic afib-eliquis,coreg.  3.DM-Insulin,Endo f/u.  4.HTN-cont bp medication.  5.COPD-MDI,singulair.  6.Lipid d/o-statin.  7.PPI.

## 2025-03-26 LAB
-  AMPICILLIN: SIGNIFICANT CHANGE UP
-  CIPROFLOXACIN: SIGNIFICANT CHANGE UP
-  LEVOFLOXACIN: SIGNIFICANT CHANGE UP
-  NITROFURANTOIN: SIGNIFICANT CHANGE UP
-  TETRACYCLINE: SIGNIFICANT CHANGE UP
-  VANCOMYCIN: SIGNIFICANT CHANGE UP
CULTURE RESULTS: ABNORMAL
METHOD TYPE: SIGNIFICANT CHANGE UP
ORGANISM # SPEC MICROSCOPIC CNT: ABNORMAL
SPECIMEN SOURCE: SIGNIFICANT CHANGE UP

## 2025-03-27 LAB — ANA TITR SER: NEGATIVE — SIGNIFICANT CHANGE UP

## 2025-03-28 LAB — PYRIDOXAL PHOS SERPL-MCNC: 9.8 UG/L — SIGNIFICANT CHANGE UP (ref 3.4–65.2)

## 2025-03-31 LAB — METHYLMALONATE SERPL-SCNC: 128 NMOL/L — SIGNIFICANT CHANGE UP (ref 0–378)

## 2025-04-04 ENCOUNTER — EMERGENCY (EMERGENCY)
Facility: HOSPITAL | Age: 89
LOS: 1 days | Discharge: HOME | End: 2025-04-04
Attending: STUDENT IN AN ORGANIZED HEALTH CARE EDUCATION/TRAINING PROGRAM
Payer: MEDICARE

## 2025-04-04 VITALS
SYSTOLIC BLOOD PRESSURE: 119 MMHG | HEART RATE: 97 BPM | TEMPERATURE: 98 F | DIASTOLIC BLOOD PRESSURE: 88 MMHG | WEIGHT: 220.02 LBS | OXYGEN SATURATION: 97 % | RESPIRATION RATE: 18 BRPM

## 2025-04-04 LAB
ALBUMIN SERPL ELPH-MCNC: 2.8 G/DL — LOW (ref 3.5–5)
ALP SERPL-CCNC: 145 U/L — HIGH (ref 40–120)
ALT FLD-CCNC: 27 U/L DA — SIGNIFICANT CHANGE UP (ref 10–60)
ANION GAP SERPL CALC-SCNC: 8 MMOL/L — SIGNIFICANT CHANGE UP (ref 5–17)
APPEARANCE UR: CLEAR — SIGNIFICANT CHANGE UP
AST SERPL-CCNC: 19 U/L — SIGNIFICANT CHANGE UP (ref 10–40)
BACTERIA # UR AUTO: ABNORMAL /HPF
BASOPHILS # BLD AUTO: 0.02 K/UL — SIGNIFICANT CHANGE UP (ref 0–0.2)
BASOPHILS NFR BLD AUTO: 0.2 % — SIGNIFICANT CHANGE UP (ref 0–2)
BILIRUB SERPL-MCNC: 0.7 MG/DL — SIGNIFICANT CHANGE UP (ref 0.2–1.2)
BILIRUB UR-MCNC: NEGATIVE — SIGNIFICANT CHANGE UP
BUN SERPL-MCNC: 25 MG/DL — HIGH (ref 7–18)
CALCIUM SERPL-MCNC: 8.4 MG/DL — SIGNIFICANT CHANGE UP (ref 8.4–10.5)
CHLORIDE SERPL-SCNC: 94 MMOL/L — LOW (ref 96–108)
CO2 SERPL-SCNC: 30 MMOL/L — SIGNIFICANT CHANGE UP (ref 22–31)
COLOR SPEC: SIGNIFICANT CHANGE UP
CREAT SERPL-MCNC: 1.05 MG/DL — SIGNIFICANT CHANGE UP (ref 0.5–1.3)
DIFF PNL FLD: NEGATIVE — SIGNIFICANT CHANGE UP
EGFR: 50 ML/MIN/1.73M2 — LOW
EGFR: 50 ML/MIN/1.73M2 — LOW
EOSINOPHIL # BLD AUTO: 0.01 K/UL — SIGNIFICANT CHANGE UP (ref 0–0.5)
EOSINOPHIL NFR BLD AUTO: 0.1 % — SIGNIFICANT CHANGE UP (ref 0–6)
EPI CELLS # UR: PRESENT
FLUAV AG NPH QL: SIGNIFICANT CHANGE UP
FLUBV AG NPH QL: SIGNIFICANT CHANGE UP
GLUCOSE SERPL-MCNC: 221 MG/DL — HIGH (ref 70–99)
GLUCOSE UR QL: NEGATIVE MG/DL — SIGNIFICANT CHANGE UP
HCT VFR BLD CALC: 42.1 % — SIGNIFICANT CHANGE UP (ref 34.5–45)
HGB BLD-MCNC: 14.5 G/DL — SIGNIFICANT CHANGE UP (ref 11.5–15.5)
IMM GRANULOCYTES NFR BLD AUTO: 1.4 % — HIGH (ref 0–0.9)
KETONES UR-MCNC: NEGATIVE MG/DL — SIGNIFICANT CHANGE UP
LEUKOCYTE ESTERASE UR-ACNC: ABNORMAL
LYMPHOCYTES # BLD AUTO: 1.16 K/UL — SIGNIFICANT CHANGE UP (ref 1–3.3)
LYMPHOCYTES # BLD AUTO: 11 % — LOW (ref 13–44)
MCHC RBC-ENTMCNC: 31.7 PG — SIGNIFICANT CHANGE UP (ref 27–34)
MCHC RBC-ENTMCNC: 34.4 G/DL — SIGNIFICANT CHANGE UP (ref 32–36)
MCV RBC AUTO: 91.9 FL — SIGNIFICANT CHANGE UP (ref 80–100)
MONOCYTES # BLD AUTO: 0.25 K/UL — SIGNIFICANT CHANGE UP (ref 0–0.9)
MONOCYTES NFR BLD AUTO: 2.4 % — SIGNIFICANT CHANGE UP (ref 2–14)
NEUTROPHILS # BLD AUTO: 8.91 K/UL — HIGH (ref 1.8–7.4)
NEUTROPHILS NFR BLD AUTO: 84.9 % — HIGH (ref 43–77)
NITRITE UR-MCNC: NEGATIVE — SIGNIFICANT CHANGE UP
NRBC BLD AUTO-RTO: 0 /100 WBCS — SIGNIFICANT CHANGE UP (ref 0–0)
NT-PROBNP SERPL-SCNC: 728 PG/ML — HIGH (ref 0–450)
PH UR: 5.5 — SIGNIFICANT CHANGE UP (ref 5–8)
PLATELET # BLD AUTO: 223 K/UL — SIGNIFICANT CHANGE UP (ref 150–400)
POTASSIUM SERPL-MCNC: 4.1 MMOL/L — SIGNIFICANT CHANGE UP (ref 3.5–5.3)
POTASSIUM SERPL-SCNC: 4.1 MMOL/L — SIGNIFICANT CHANGE UP (ref 3.5–5.3)
PROT SERPL-MCNC: 6 G/DL — SIGNIFICANT CHANGE UP (ref 6–8.3)
PROT UR-MCNC: NEGATIVE MG/DL — SIGNIFICANT CHANGE UP
RBC # BLD: 4.58 M/UL — SIGNIFICANT CHANGE UP (ref 3.8–5.2)
RBC # FLD: 13 % — SIGNIFICANT CHANGE UP (ref 10.3–14.5)
RBC CASTS # UR COMP ASSIST: 1 /HPF — SIGNIFICANT CHANGE UP (ref 0–4)
RSV RNA NPH QL NAA+NON-PROBE: SIGNIFICANT CHANGE UP
SARS-COV-2 RNA SPEC QL NAA+PROBE: DETECTED
SODIUM SERPL-SCNC: 132 MMOL/L — LOW (ref 135–145)
SOURCE RESPIRATORY: SIGNIFICANT CHANGE UP
SP GR SPEC: 1.01 — SIGNIFICANT CHANGE UP (ref 1–1.03)
TROPONIN I, HIGH SENSITIVITY RESULT: 40.7 NG/L — SIGNIFICANT CHANGE UP
UROBILINOGEN FLD QL: 1 MG/DL — SIGNIFICANT CHANGE UP (ref 0.2–1)
WBC # BLD: 10.5 K/UL — SIGNIFICANT CHANGE UP (ref 3.8–10.5)
WBC # FLD AUTO: 10.5 K/UL — SIGNIFICANT CHANGE UP (ref 3.8–10.5)
WBC UR QL: 3 /HPF — SIGNIFICANT CHANGE UP (ref 0–5)

## 2025-04-04 PROCEDURE — 71045 X-RAY EXAM CHEST 1 VIEW: CPT

## 2025-04-04 PROCEDURE — 99285 EMERGENCY DEPT VISIT HI MDM: CPT

## 2025-04-04 PROCEDURE — 87637 SARSCOV2&INF A&B&RSV AMP PRB: CPT

## 2025-04-04 PROCEDURE — 81001 URINALYSIS AUTO W/SCOPE: CPT

## 2025-04-04 PROCEDURE — 71045 X-RAY EXAM CHEST 1 VIEW: CPT | Mod: 26

## 2025-04-04 PROCEDURE — 84484 ASSAY OF TROPONIN QUANT: CPT

## 2025-04-04 PROCEDURE — 36415 COLL VENOUS BLD VENIPUNCTURE: CPT

## 2025-04-04 PROCEDURE — 85025 COMPLETE CBC W/AUTO DIFF WBC: CPT

## 2025-04-04 PROCEDURE — 99284 EMERGENCY DEPT VISIT MOD MDM: CPT | Mod: 25

## 2025-04-04 PROCEDURE — 80053 COMPREHEN METABOLIC PANEL: CPT

## 2025-04-04 PROCEDURE — 83880 ASSAY OF NATRIURETIC PEPTIDE: CPT

## 2025-04-04 PROCEDURE — 87086 URINE CULTURE/COLONY COUNT: CPT

## 2025-04-04 RX ADMIN — Medication 250 MILLILITER(S): at 18:43

## 2025-04-04 NOTE — ED PROVIDER NOTE - CLINICAL SUMMARY MEDICAL DECISION MAKING FREE TEXT BOX
89-year-old female with extensive past medical history including heart failure, A-fib on Eliquis coming in with generalized weakness and " not feeling right" ongoing for past couple of days.  Triage note mentions epigastric pain and right knee pain–patient denies abdominal pain at this time and states knee pain is a ongoing problem.  No nausea, vomiting, diarrhea.  Patient is accompanied by her granddaughter who states patient sometimes uses cane and has assistance to ambulate otherwise mostly stays in bed.  Reports compliance with her medications.  No fevers, chills.  No urinary complaints.  No rashes anywhere.    Patient is nontoxic-appearing.  No distress.  Abdomen is soft and nontender.  Clear to auscultation bilaterally.  Regular rate and rhythm.  Patient is able to flex both of her lower extremities to about 40 degrees.  No knee erythema, increased warmth to touch.  Equal strength and sensation in both upper and lower extremities.    Differential diagnoses include but not limited to electrolyte abnormalities, anemia, pneumonia, UTI, viral illness.

## 2025-04-04 NOTE — ED PROVIDER NOTE - PATIENT PORTAL LINK FT
You can access the FollowMyHealth Patient Portal offered by [facility] by registering at the following website: [email or web address]. By joining [facility]’s FollowMyHealth portal, you will also be able to view your health information using other applications (apps) compatible with our system.

## 2025-04-04 NOTE — ED ADULT TRIAGE NOTE - CHIEF COMPLAINT QUOTE
Pt c/o epigastric pain and right knee pin related to arthritis x yesterday. No falls/injuries/trauma per granddaughter. No N/V/D.

## 2025-04-04 NOTE — ED ADULT NURSE NOTE - OBJECTIVE STATEMENT
Pt presents for epigastric pain for several day with nausea. Pt denies v/d, chest pain and SOB. Pt reprts bilateral knee pain left worse than right due to arthritis exacerbation.

## 2025-04-04 NOTE — ED PROVIDER NOTE - PROGRESS NOTE DETAILS
Patient is accompanied by her granddaughter.  COVID results are discussed.  Informed this is likely making her feel weak.  Patient is offered to be admitted for generalized weakness.  Patient opts to go home instead.  Strict return precautions are discussed.  All questions were answered.  Family requested to set up an ambulance for her to go home.

## 2025-04-04 NOTE — ED ADULT NURSE NOTE - NSFALLUNIVINTERV_ED_ALL_ED
Bed/Stretcher in lowest position, wheels locked, appropriate side rails in place/Call bell, personal items and telephone in reach/Instruct patient to call for assistance before getting out of bed/chair/stretcher/Non-slip footwear applied when patient is off stretcher/[place] to call system/Physically safe environment - no spills, clutter or unnecessary equipment/Purposeful proactive rounding/Room/bathroom lighting operational, light cord in reach

## 2025-04-04 NOTE — ED PROVIDER NOTE - NSFOLLOWUPINSTRUCTIONS_ED_ALL_ED_FT
You were seen today for generalized weakness.  You are found to have COVID.  You were offered to be admitted to the hospital.  You opted to go home instead.  If you start developing chest pain, difficulty breathing, fevers, worsening generalized weakness or any other concerning symptoms please seek medical assistance immediately.

## 2025-04-05 VITALS
OXYGEN SATURATION: 95 % | DIASTOLIC BLOOD PRESSURE: 94 MMHG | RESPIRATION RATE: 18 BRPM | SYSTOLIC BLOOD PRESSURE: 140 MMHG | HEART RATE: 78 BPM | TEMPERATURE: 98 F

## 2025-04-05 PROBLEM — I10 ESSENTIAL (PRIMARY) HYPERTENSION: Chronic | Status: ACTIVE | Noted: 2025-03-21

## 2025-04-05 LAB
CULTURE RESULTS: SIGNIFICANT CHANGE UP
SPECIMEN SOURCE: SIGNIFICANT CHANGE UP

## 2025-04-08 ENCOUNTER — INPATIENT (INPATIENT)
Facility: HOSPITAL | Age: 89
LOS: 6 days | Discharge: HOME | DRG: 392 | End: 2025-04-15
Attending: INTERNAL MEDICINE | Admitting: INTERNAL MEDICINE
Payer: MEDICARE

## 2025-04-08 VITALS
SYSTOLIC BLOOD PRESSURE: 107 MMHG | HEIGHT: 72 IN | DIASTOLIC BLOOD PRESSURE: 77 MMHG | HEART RATE: 85 BPM | RESPIRATION RATE: 82 BRPM | TEMPERATURE: 98 F | OXYGEN SATURATION: 97 %

## 2025-04-08 DIAGNOSIS — K52.9 NONINFECTIVE GASTROENTERITIS AND COLITIS, UNSPECIFIED: ICD-10-CM

## 2025-04-08 LAB
ALBUMIN SERPL ELPH-MCNC: 2.7 G/DL — LOW (ref 3.5–5)
ALP SERPL-CCNC: 146 U/L — HIGH (ref 40–120)
ALT FLD-CCNC: 29 U/L DA — SIGNIFICANT CHANGE UP (ref 10–60)
ANION GAP SERPL CALC-SCNC: 7 MMOL/L — SIGNIFICANT CHANGE UP (ref 5–17)
AST SERPL-CCNC: 16 U/L — SIGNIFICANT CHANGE UP (ref 10–40)
BASE EXCESS BLDV CALC-SCNC: 6.4 MMOL/L — SIGNIFICANT CHANGE UP
BASOPHILS # BLD AUTO: 0.02 K/UL — SIGNIFICANT CHANGE UP (ref 0–0.2)
BASOPHILS NFR BLD AUTO: 0.2 % — SIGNIFICANT CHANGE UP (ref 0–2)
BILIRUB SERPL-MCNC: 0.6 MG/DL — SIGNIFICANT CHANGE UP (ref 0.2–1.2)
BUN SERPL-MCNC: 23 MG/DL — HIGH (ref 7–18)
CALCIUM SERPL-MCNC: 8.3 MG/DL — LOW (ref 8.4–10.5)
CHLORIDE SERPL-SCNC: 97 MMOL/L — SIGNIFICANT CHANGE UP (ref 96–108)
CO2 SERPL-SCNC: 30 MMOL/L — SIGNIFICANT CHANGE UP (ref 22–31)
CREAT SERPL-MCNC: 1.02 MG/DL — SIGNIFICANT CHANGE UP (ref 0.5–1.3)
EGFR: 51 ML/MIN/1.73M2 — LOW
EGFR: 51 ML/MIN/1.73M2 — LOW
EOSINOPHIL # BLD AUTO: 0.02 K/UL — SIGNIFICANT CHANGE UP (ref 0–0.5)
EOSINOPHIL NFR BLD AUTO: 0.2 % — SIGNIFICANT CHANGE UP (ref 0–6)
GAS PNL BLDA: SIGNIFICANT CHANGE UP
GAS PNL BLDV: 131 MMOL/L — LOW (ref 136–145)
GAS PNL BLDV: SIGNIFICANT CHANGE UP
GLUCOSE BLDV-MCNC: 172 MG/DL — HIGH (ref 70–99)
GLUCOSE SERPL-MCNC: 186 MG/DL — HIGH (ref 70–99)
HCO3 BLDV-SCNC: 31 MMOL/L — HIGH (ref 22–29)
HCT VFR BLD CALC: 41.2 % — SIGNIFICANT CHANGE UP (ref 34.5–45)
HGB BLD-MCNC: 14.2 G/DL — SIGNIFICANT CHANGE UP (ref 11.5–15.5)
HOROWITZ INDEX BLDV+IHG-RTO: 21 — SIGNIFICANT CHANGE UP
IMM GRANULOCYTES NFR BLD AUTO: 1.7 % — HIGH (ref 0–0.9)
LACTATE BLDV-MCNC: 4.5 MMOL/L — CRITICAL HIGH (ref 0.5–2)
LIDOCAIN IGE QN: 32 U/L — SIGNIFICANT CHANGE UP (ref 13–75)
LYMPHOCYTES # BLD AUTO: 1.32 K/UL — SIGNIFICANT CHANGE UP (ref 1–3.3)
LYMPHOCYTES # BLD AUTO: 12.5 % — LOW (ref 13–44)
MAGNESIUM SERPL-MCNC: 1.6 MG/DL — SIGNIFICANT CHANGE UP (ref 1.6–2.6)
MCHC RBC-ENTMCNC: 31.7 PG — SIGNIFICANT CHANGE UP (ref 27–34)
MCHC RBC-ENTMCNC: 34.5 G/DL — SIGNIFICANT CHANGE UP (ref 32–36)
MCV RBC AUTO: 92 FL — SIGNIFICANT CHANGE UP (ref 80–100)
MONOCYTES # BLD AUTO: 0.39 K/UL — SIGNIFICANT CHANGE UP (ref 0–0.9)
MONOCYTES NFR BLD AUTO: 3.7 % — SIGNIFICANT CHANGE UP (ref 2–14)
NEUTROPHILS # BLD AUTO: 8.6 K/UL — HIGH (ref 1.8–7.4)
NEUTROPHILS NFR BLD AUTO: 81.7 % — HIGH (ref 43–77)
NRBC BLD AUTO-RTO: 0 /100 WBCS — SIGNIFICANT CHANGE UP (ref 0–0)
PCO2 BLDV: 45 MMHG — HIGH (ref 39–42)
PH BLDV: 7.45 — HIGH (ref 7.32–7.43)
PHOSPHATE SERPL-MCNC: 1.9 MG/DL — LOW (ref 2.5–4.5)
PLATELET # BLD AUTO: 219 K/UL — SIGNIFICANT CHANGE UP (ref 150–400)
PO2 BLDV: 41 MMHG — SIGNIFICANT CHANGE UP
POTASSIUM BLDV-SCNC: 4 MMOL/L — SIGNIFICANT CHANGE UP (ref 3.5–5.1)
POTASSIUM SERPL-MCNC: 3.8 MMOL/L — SIGNIFICANT CHANGE UP (ref 3.5–5.3)
POTASSIUM SERPL-SCNC: 3.8 MMOL/L — SIGNIFICANT CHANGE UP (ref 3.5–5.3)
PROT SERPL-MCNC: 5.9 G/DL — LOW (ref 6–8.3)
RBC # BLD: 4.48 M/UL — SIGNIFICANT CHANGE UP (ref 3.8–5.2)
RBC # FLD: 13.1 % — SIGNIFICANT CHANGE UP (ref 10.3–14.5)
SAO2 % BLDV: 69.5 % — SIGNIFICANT CHANGE UP
SODIUM SERPL-SCNC: 134 MMOL/L — LOW (ref 135–145)
WBC # BLD: 10.53 K/UL — HIGH (ref 3.8–10.5)
WBC # FLD AUTO: 10.53 K/UL — HIGH (ref 3.8–10.5)

## 2025-04-08 PROCEDURE — 74178 CT ABD&PLV WO CNTR FLWD CNTR: CPT | Mod: 26

## 2025-04-08 PROCEDURE — 99285 EMERGENCY DEPT VISIT HI MDM: CPT

## 2025-04-08 RX ORDER — SUCRALFATE 1 G
1 TABLET ORAL ONCE
Refills: 0 | Status: COMPLETED | OUTPATIENT
Start: 2025-04-08 | End: 2025-04-08

## 2025-04-08 RX ORDER — MAGNESIUM HYDROXIDE 400 MG/5ML
30 SUSPENSION ORAL ONCE
Refills: 0 | Status: COMPLETED | OUTPATIENT
Start: 2025-04-08 | End: 2025-04-08

## 2025-04-08 RX ORDER — SALINE 7; 19 G/118ML; G/118ML
1 ENEMA RECTAL ONCE
Refills: 0 | Status: COMPLETED | OUTPATIENT
Start: 2025-04-08 | End: 2025-04-08

## 2025-04-08 RX ADMIN — Medication 500 MILLILITER(S): at 19:07

## 2025-04-08 RX ADMIN — MAGNESIUM HYDROXIDE 30 MILLILITER(S): 400 SUSPENSION ORAL at 22:51

## 2025-04-08 RX ADMIN — Medication 20 MILLIGRAM(S): at 19:07

## 2025-04-08 RX ADMIN — SALINE 1 ENEMA: 7; 19 ENEMA RECTAL at 22:51

## 2025-04-08 NOTE — ED ADULT NURSE NOTE - CAS EDN DISCHARGE INTERVENTIONS
Med rec complete per pt at bedside    Allergies reviewed and updated.       Arm band on/IV intact/Admission wristband placed

## 2025-04-08 NOTE — ED ADULT TRIAGE NOTE - PATIENT'S PREFERRED PRONOUN
Her/She History obtained by aide at bedside as well as nurse Jade from the group home.  Staff has noticed she has been having decreased intake of food since last week.  States yesterday she did not eat and eat any of her favorite foods.  They were worried since she was on metformin that her sugar might drop.  They state that she puts the food in her mouth but then she spits it out.  Caregiver states she feels like she is not at her baseline.  Yesterday she was taken to a preop dental appointment to obtain blood work and they were unable to get the blood work.  She has a scheduled dental appointment on Friday for a cleaning which requires sedation.    Of note she has a history of diabetes, hypothyroid and is on valproic acid for mood.  She is also on a birth control pill.  She has recently gained weight therefore there have been some adjustments to her medications.  She does take her medications recently and has not missed any doses.    We will obtain basic labs including an EKG at the request of the facility for preop screening however no suspicion for any cardiac etiology.  We will check basic labs to check her for kidney function her glucose and ensure that there are no signs of infection.  We will also obtain a urinalysis to rule out a urinary tract infection.    Per aide at bedside patient usually does feed herself and she is able to go to the bathroom and use the toilet bowl with some assistance.  She has been using the bathroom normally and has had normal bowel movements per the staff.    General: Well appearing, well nourished, in no distress  Head: Normocephalic, atraumatic  Eyes: Conjunctiva clear, pupils reactive   Mouth: Mucous membranes moist, apthous ulcer noted back left no overt dental infection no erythema swelling or palpable abscesses togue normal   Neck: Supple  Heart: Regular rate and rhythm, no murmur or gallop  Lungs: Clear to auscultation  Abdomen: Bowel sounds present, soft, no tenderness, non distended, no organomegaly, masses  Back: Spine normal without deformity or tenderness, no CVA tenderness  Extremities: No amputations or deformities, cyanosis, edema orthotic shoe devices   Musculoskeletal:REBOLLAR, pulses intact  Neurologic: No gross deficits at baseline ( eye strabismus which is baseline )  Psychiatric: intermittently following commands normal gait   Skin: Warm,dry.

## 2025-04-08 NOTE — ED ADULT TRIAGE NOTE - CHIEF COMPLAINT QUOTE
high blood glucose level 433mg/dl this morning  as per daughter.  Patient c/o dry mouth abdominal pain x today . F/S at triage 108mg/dl

## 2025-04-08 NOTE — ED PROVIDER NOTE - PHYSICAL EXAMINATION
General: no acute distress   HEENT: normocephalic, atraumatic   Respiratory: normal work of breathing, lungs clear to auscultation bilaterally   Cardiac: regular rate and rhythm   Abdomen: soft, epigastric tenderness to palpation   MSK: no swelling or tenderness of lower extremities, moving all extremities spontaneously   Skin: warm, dry   Neuro: A&Ox3  Psych: appropriate affect SEIZURE

## 2025-04-08 NOTE — ED PROVIDER NOTE - PROGRESS NOTE DETAILS
updated patient and family on results. repeat lactate remains elevated. concern for colitis. gentle fluid rehydration given h/o CHF and concern for fluid overload. Dequan Markham

## 2025-04-08 NOTE — ED PROVIDER NOTE - CLINICAL SUMMARY MEDICAL DECISION MAKING FREE TEXT BOX
89-year-old male presenting with abdominal pain and generalized weakness. patient also reportedly significantly hyperglycemic at home.  Recently seen emergency department for similar symptoms and found to have COVID-19.  Patient complaining of abdominal pain and has epigastric tenderness on exam.  Possible gastritis versus pancreatitis versus cholecystitis.  Will obtain labs and CT abdomen/pelvis to assess.

## 2025-04-08 NOTE — ED ADULT NURSE REASSESSMENT NOTE - NS ED NURSE REASSESS COMMENT FT1
Attempts made for blood draw and iv access with no success. Pt and daughter requesting  to insert IV using sonogram machine. Dr. Markham made aware.

## 2025-04-08 NOTE — ED PROVIDER NOTE - OBJECTIVE STATEMENT
89F, pmh of COPD, CHF, AFIB ( on eliquis), presenting with abdominal pain. History provided by patient's daughter and granddaughter at bedside.  Was feeling well earlier today but when the physical therapist came she began to complain that she "did not feel well".  Patient now complains that she is sleepy.  Also reporting abdominal pain.  No fever, cough, nausea vomiting.  Family reports when they checked the patient's blood sugar it was over 400 at the time.

## 2025-04-09 DIAGNOSIS — U07.1 COVID-19: ICD-10-CM

## 2025-04-09 DIAGNOSIS — I48.20 CHRONIC ATRIAL FIBRILLATION, UNSPECIFIED: ICD-10-CM

## 2025-04-09 DIAGNOSIS — K52.9 NONINFECTIVE GASTROENTERITIS AND COLITIS, UNSPECIFIED: ICD-10-CM

## 2025-04-09 DIAGNOSIS — J44.9 CHRONIC OBSTRUCTIVE PULMONARY DISEASE, UNSPECIFIED: ICD-10-CM

## 2025-04-09 DIAGNOSIS — I10 ESSENTIAL (PRIMARY) HYPERTENSION: ICD-10-CM

## 2025-04-09 DIAGNOSIS — E11.9 TYPE 2 DIABETES MELLITUS WITHOUT COMPLICATIONS: ICD-10-CM

## 2025-04-09 DIAGNOSIS — Z29.9 ENCOUNTER FOR PROPHYLACTIC MEASURES, UNSPECIFIED: ICD-10-CM

## 2025-04-09 LAB
ANION GAP SERPL CALC-SCNC: 9 MMOL/L — SIGNIFICANT CHANGE UP (ref 5–17)
BUN SERPL-MCNC: 21 MG/DL — HIGH (ref 7–18)
CALCIUM SERPL-MCNC: 8 MG/DL — LOW (ref 8.4–10.5)
CHLORIDE SERPL-SCNC: 97 MMOL/L — SIGNIFICANT CHANGE UP (ref 96–108)
CO2 SERPL-SCNC: 28 MMOL/L — SIGNIFICANT CHANGE UP (ref 22–31)
CREAT SERPL-MCNC: 0.78 MG/DL — SIGNIFICANT CHANGE UP (ref 0.5–1.3)
EGFR: 71 ML/MIN/1.73M2 — SIGNIFICANT CHANGE UP
EGFR: 71 ML/MIN/1.73M2 — SIGNIFICANT CHANGE UP
GLUCOSE BLDC GLUCOMTR-MCNC: 120 MG/DL — HIGH (ref 70–99)
GLUCOSE BLDC GLUCOMTR-MCNC: 143 MG/DL — HIGH (ref 70–99)
GLUCOSE BLDC GLUCOMTR-MCNC: 172 MG/DL — HIGH (ref 70–99)
GLUCOSE BLDC GLUCOMTR-MCNC: 181 MG/DL — HIGH (ref 70–99)
GLUCOSE BLDC GLUCOMTR-MCNC: 276 MG/DL — HIGH (ref 70–99)
GLUCOSE SERPL-MCNC: 164 MG/DL — HIGH (ref 70–99)
HCT VFR BLD CALC: 40.1 % — SIGNIFICANT CHANGE UP (ref 34.5–45)
HGB BLD-MCNC: 13.8 G/DL — SIGNIFICANT CHANGE UP (ref 11.5–15.5)
LACTATE SERPL-SCNC: 3 MMOL/L — HIGH (ref 0.7–2)
LACTATE SERPL-SCNC: 4.1 MMOL/L — CRITICAL HIGH (ref 0.7–2)
MAGNESIUM SERPL-MCNC: 2 MG/DL — SIGNIFICANT CHANGE UP (ref 1.6–2.6)
MCHC RBC-ENTMCNC: 31.7 PG — SIGNIFICANT CHANGE UP (ref 27–34)
MCHC RBC-ENTMCNC: 34.4 G/DL — SIGNIFICANT CHANGE UP (ref 32–36)
MCV RBC AUTO: 92.2 FL — SIGNIFICANT CHANGE UP (ref 80–100)
MRSA PCR RESULT.: DETECTED
NRBC BLD AUTO-RTO: 0 /100 WBCS — SIGNIFICANT CHANGE UP (ref 0–0)
PHOSPHATE SERPL-MCNC: 2.5 MG/DL — SIGNIFICANT CHANGE UP (ref 2.5–4.5)
PLATELET # BLD AUTO: 199 K/UL — SIGNIFICANT CHANGE UP (ref 150–400)
POTASSIUM SERPL-MCNC: 3.8 MMOL/L — SIGNIFICANT CHANGE UP (ref 3.5–5.3)
POTASSIUM SERPL-SCNC: 3.8 MMOL/L — SIGNIFICANT CHANGE UP (ref 3.5–5.3)
RBC # BLD: 4.35 M/UL — SIGNIFICANT CHANGE UP (ref 3.8–5.2)
RBC # FLD: 13.2 % — SIGNIFICANT CHANGE UP (ref 10.3–14.5)
S AUREUS DNA NOSE QL NAA+PROBE: DETECTED
SODIUM SERPL-SCNC: 134 MMOL/L — LOW (ref 135–145)
WBC # BLD: 10.17 K/UL — SIGNIFICANT CHANGE UP (ref 3.8–10.5)
WBC # FLD AUTO: 10.17 K/UL — SIGNIFICANT CHANGE UP (ref 3.8–10.5)

## 2025-04-09 PROCEDURE — 36000 PLACE NEEDLE IN VEIN: CPT

## 2025-04-09 RX ORDER — INSULIN LISPRO 100 U/ML
INJECTION, SOLUTION INTRAVENOUS; SUBCUTANEOUS AT BEDTIME
Refills: 0 | Status: DISCONTINUED | OUTPATIENT
Start: 2025-04-09 | End: 2025-04-15

## 2025-04-09 RX ORDER — REMDESIVIR 5 MG/ML
INJECTION INTRAVENOUS
Refills: 0 | Status: DISCONTINUED | OUTPATIENT
Start: 2025-04-09 | End: 2025-04-10

## 2025-04-09 RX ORDER — CARVEDILOL 3.12 MG/1
6.25 TABLET, FILM COATED ORAL EVERY 12 HOURS
Refills: 0 | Status: DISCONTINUED | OUTPATIENT
Start: 2025-04-09 | End: 2025-04-15

## 2025-04-09 RX ORDER — REMDESIVIR 5 MG/ML
100 INJECTION INTRAVENOUS EVERY 24 HOURS
Refills: 0 | Status: DISCONTINUED | OUTPATIENT
Start: 2025-04-10 | End: 2025-04-10

## 2025-04-09 RX ORDER — REMDESIVIR 5 MG/ML
200 INJECTION INTRAVENOUS EVERY 24 HOURS
Refills: 0 | Status: COMPLETED | OUTPATIENT
Start: 2025-04-09 | End: 2025-04-09

## 2025-04-09 RX ORDER — SENNA 187 MG
2 TABLET ORAL AT BEDTIME
Refills: 0 | Status: DISCONTINUED | OUTPATIENT
Start: 2025-04-09 | End: 2025-04-15

## 2025-04-09 RX ORDER — METFORMIN HYDROCHLORIDE 850 MG/1
1 TABLET ORAL
Refills: 0 | DISCHARGE

## 2025-04-09 RX ORDER — IPRATROPIUM BROMIDE AND ALBUTEROL SULFATE .5; 2.5 MG/3ML; MG/3ML
3 SOLUTION RESPIRATORY (INHALATION) EVERY 6 HOURS
Refills: 0 | Status: DISCONTINUED | OUTPATIENT
Start: 2025-04-09 | End: 2025-04-15

## 2025-04-09 RX ORDER — MUPIROCIN CALCIUM 20 MG/G
1 CREAM TOPICAL
Refills: 0 | Status: COMPLETED | OUTPATIENT
Start: 2025-04-09 | End: 2025-04-14

## 2025-04-09 RX ORDER — INSULIN LISPRO 100 U/ML
INJECTION, SOLUTION INTRAVENOUS; SUBCUTANEOUS
Refills: 0 | Status: DISCONTINUED | OUTPATIENT
Start: 2025-04-09 | End: 2025-04-15

## 2025-04-09 RX ORDER — POLYETHYLENE GLYCOL 3350 17 G/17G
17 POWDER, FOR SOLUTION ORAL
Refills: 0 | Status: DISCONTINUED | OUTPATIENT
Start: 2025-04-09 | End: 2025-04-15

## 2025-04-09 RX ORDER — ONDANSETRON HCL/PF 4 MG/2 ML
4 VIAL (ML) INJECTION EVERY 8 HOURS
Refills: 0 | Status: DISCONTINUED | OUTPATIENT
Start: 2025-04-09 | End: 2025-04-15

## 2025-04-09 RX ORDER — ATORVASTATIN CALCIUM 80 MG/1
10 TABLET, FILM COATED ORAL AT BEDTIME
Refills: 0 | Status: DISCONTINUED | OUTPATIENT
Start: 2025-04-09 | End: 2025-04-15

## 2025-04-09 RX ORDER — APIXABAN 2.5 MG/1
5 TABLET, FILM COATED ORAL EVERY 12 HOURS
Refills: 0 | Status: DISCONTINUED | OUTPATIENT
Start: 2025-04-09 | End: 2025-04-15

## 2025-04-09 RX ORDER — MONTELUKAST SODIUM 10 MG/1
10 TABLET ORAL DAILY
Refills: 0 | Status: DISCONTINUED | OUTPATIENT
Start: 2025-04-09 | End: 2025-04-15

## 2025-04-09 RX ORDER — CEFTRIAXONE 500 MG/1
1000 INJECTION, POWDER, FOR SOLUTION INTRAMUSCULAR; INTRAVENOUS EVERY 24 HOURS
Refills: 0 | Status: COMPLETED | OUTPATIENT
Start: 2025-04-09 | End: 2025-04-15

## 2025-04-09 RX ORDER — METRONIDAZOLE 250 MG
500 TABLET ORAL EVERY 12 HOURS
Refills: 0 | Status: DISCONTINUED | OUTPATIENT
Start: 2025-04-09 | End: 2025-04-15

## 2025-04-09 RX ORDER — ACETAMINOPHEN 500 MG/5ML
650 LIQUID (ML) ORAL EVERY 6 HOURS
Refills: 0 | Status: DISCONTINUED | OUTPATIENT
Start: 2025-04-09 | End: 2025-04-15

## 2025-04-09 RX ORDER — TIOTROPIUM BROMIDE INHALATION SPRAY 3.12 UG/1
2 SPRAY, METERED RESPIRATORY (INHALATION) DAILY
Refills: 0 | Status: DISCONTINUED | OUTPATIENT
Start: 2025-04-09 | End: 2025-04-15

## 2025-04-09 RX ORDER — ANASTROZOLE 1 MG/1
1 TABLET ORAL DAILY
Refills: 0 | Status: DISCONTINUED | OUTPATIENT
Start: 2025-04-09 | End: 2025-04-09

## 2025-04-09 RX ORDER — SOD PHOS DI, MONO/K PHOS MONO 250 MG
1 TABLET ORAL ONCE
Refills: 0 | Status: COMPLETED | OUTPATIENT
Start: 2025-04-09 | End: 2025-04-09

## 2025-04-09 RX ORDER — INSULIN GLARGINE-YFGN 100 [IU]/ML
7 INJECTION, SOLUTION SUBCUTANEOUS AT BEDTIME
Refills: 0 | Status: DISCONTINUED | OUTPATIENT
Start: 2025-04-09 | End: 2025-04-15

## 2025-04-09 RX ORDER — INSULIN LISPRO 100 U/ML
INJECTION, SOLUTION INTRAVENOUS; SUBCUTANEOUS
Refills: 0 | Status: DISCONTINUED | OUTPATIENT
Start: 2025-04-09 | End: 2025-04-09

## 2025-04-09 RX ADMIN — REMDESIVIR 200 MILLIGRAM(S): 5 INJECTION INTRAVENOUS at 17:26

## 2025-04-09 RX ADMIN — Medication 100 MILLIGRAM(S): at 06:56

## 2025-04-09 RX ADMIN — Medication 100 MILLIGRAM(S): at 18:11

## 2025-04-09 RX ADMIN — INSULIN LISPRO 1: 100 INJECTION, SOLUTION INTRAVENOUS; SUBCUTANEOUS at 08:12

## 2025-04-09 RX ADMIN — Medication 1 PACKET(S): at 02:57

## 2025-04-09 RX ADMIN — MONTELUKAST SODIUM 10 MILLIGRAM(S): 10 TABLET ORAL at 13:36

## 2025-04-09 RX ADMIN — Medication 1 APPLICATION(S): at 18:12

## 2025-04-09 RX ADMIN — APIXABAN 5 MILLIGRAM(S): 2.5 TABLET, FILM COATED ORAL at 18:11

## 2025-04-09 RX ADMIN — APIXABAN 5 MILLIGRAM(S): 2.5 TABLET, FILM COATED ORAL at 06:23

## 2025-04-09 RX ADMIN — CARVEDILOL 6.25 MILLIGRAM(S): 3.12 TABLET, FILM COATED ORAL at 18:11

## 2025-04-09 RX ADMIN — TIOTROPIUM BROMIDE INHALATION SPRAY 2 PUFF(S): 3.12 SPRAY, METERED RESPIRATORY (INHALATION) at 16:50

## 2025-04-09 RX ADMIN — POLYETHYLENE GLYCOL 3350 17 GRAM(S): 17 POWDER, FOR SOLUTION ORAL at 18:11

## 2025-04-09 RX ADMIN — CARVEDILOL 6.25 MILLIGRAM(S): 3.12 TABLET, FILM COATED ORAL at 06:23

## 2025-04-09 RX ADMIN — Medication 1 DOSE(S): at 21:48

## 2025-04-09 RX ADMIN — CEFTRIAXONE 100 MILLIGRAM(S): 500 INJECTION, POWDER, FOR SOLUTION INTRAMUSCULAR; INTRAVENOUS at 06:56

## 2025-04-09 RX ADMIN — Medication 2 TABLET(S): at 21:46

## 2025-04-09 RX ADMIN — INSULIN LISPRO 1: 100 INJECTION, SOLUTION INTRAVENOUS; SUBCUTANEOUS at 13:37

## 2025-04-09 RX ADMIN — Medication 250 MILLILITER(S): at 08:32

## 2025-04-09 RX ADMIN — POLYETHYLENE GLYCOL 3350 17 GRAM(S): 17 POWDER, FOR SOLUTION ORAL at 02:56

## 2025-04-09 RX ADMIN — Medication 2 TABLET(S): at 02:56

## 2025-04-09 RX ADMIN — ATORVASTATIN CALCIUM 10 MILLIGRAM(S): 80 TABLET, FILM COATED ORAL at 21:47

## 2025-04-09 RX ADMIN — Medication 20 MILLIGRAM(S): at 13:38

## 2025-04-09 RX ADMIN — MUPIROCIN CALCIUM 1 APPLICATION(S): 20 CREAM TOPICAL at 18:13

## 2025-04-09 NOTE — CONSULT NOTE ADULT - SUBJECTIVE AND OBJECTIVE BOX
Patient is a 89y old  Female who presents with a chief complaint of Colitis (09 Apr 2025 01:38)  Patient is a 89 yo F COPD, CHF, AFIB ( on eliquis), DM that comes in for abdmoinal pain. Patient a poor historian at bedside, unable to reach over the phone to daughter. Pateint endoring that today was feeling unwell with weakness, dizziness after physical therapy. Per ED note patient was also noted to be sleepy. Pateint endorses that has been going to the bathroom to poop consistently, hard stools, last one this morning. Patient denies any other associated symptoms such as SOB,CP,N/V/D or any urinary symptoms.       REVIEW OF SYSTEMS: Total of twelve systems have been reviewed with patient and found to be negative unless mentioned in HPI      PAST MEDICAL & SURGICAL HISTORY:  Arthritis  Legally blind  Pre-diabetes  Breast cancer  right, no chemo or radiation  COPD exacerbation  Atrial fibrillation  HF (heart failure)  HFpEF 7/29  Deep vein thrombosis (DVT)  Left Lower Extremity  HLD (hyperlipidemia)  COPD exacerbation  HTN (hypertension)  No significant past surgical history        SOCIAL HISTORY  Alcohol: Does not drink  Tobacco: Does not smoke  Illicit substance use: None      FAMILY HISTORY: Non contributory to the present illness        ALLERGIES : Shrimp (Unknown)  losartan (Angioedema)  Chicken (Stomach Upset)      Vital Signs Last 24 Hrs  T(C): 35.9 (09 Apr 2025 04:44), Max: 36.8 (08 Apr 2025 15:55)  T(F): 96.7 (09 Apr 2025 04:44), Max: 98.2 (08 Apr 2025 15:55)  HR: 76 (09 Apr 2025 04:44) (76 - 85)  BP: 115/83 (09 Apr 2025 04:44) (107/77 - 120/86)  BP(mean): --  RR: 18 (09 Apr 2025 04:44) (18 - 82)  SpO2: 99% (09 Apr 2025 04:44) (96% - 99%)    Parameters below as of 09 Apr 2025 04:44  Patient On (Oxygen Delivery Method): room air        PHYSICAL EXAM:  GENERAL: Not in distress   CHEST/LUNG:  Aire ntry bilaterally  HEART: s1 and s2 present  ABDOMEN:  Nontender and  Nondistended  EXTREMITIES: No pedal  edema  CNS: Awake and Alert      LABS:                        13.8   10.17 )-----------( 199      ( 09 Apr 2025 06:35 )             40.1       04-08    134[L]  |  97  |  23[H]  ----------------------------<  186[H]  3.8   |  30  |  1.02    Ca    8.3[L]      08 Apr 2025 18:59  Phos  1.9     04-08  Mg     1.6     04-08    TPro  5.9[L]  /  Alb  2.7[L]  /  TBili  0.6  /  DBili  x   /  AST  16  /  ALT  29  /  AlkPhos  146[H]  04-08        CAPILLARY BLOOD GLUCOSE  POCT Blood Glucose.: 180 mg/dL (08 Apr 2025 15:53)    ABG - ( 08 Apr 2025 22:05 )  pH, Arterial: 7.48  pH, Blood: x     /  pCO2: 39    /  pO2: 97    / HCO3: 29    / Base Excess: 5.2   /  SaO2: 98              MEDICATIONS  (STANDING):  apixaban 5 milliGRAM(s) Oral every 12 hours  atorvastatin 10 milliGRAM(s) Oral at bedtime  carvedilol 6.25 milliGRAM(s) Oral every 12 hours  cefTRIAXone   IVPB 1000 milliGRAM(s) IV Intermittent every 24 hours  famotidine    Tablet 20 milliGRAM(s) Oral daily  fluticasone propionate/ salmeterol 100-50 MICROgram(s) Diskus 1 Dose(s) Inhalation two times a day  insulin glargine Injectable (LANTUS) 7 Unit(s) SubCutaneous at bedtime  insulin lispro (ADMELOG) corrective regimen sliding scale   SubCutaneous three times a day before meals  insulin lispro (ADMELOG) corrective regimen sliding scale   SubCutaneous three times a day before meals  insulin lispro (ADMELOG) corrective regimen sliding scale   SubCutaneous at bedtime  metroNIDAZOLE  IVPB 500 milliGRAM(s) IV Intermittent every 12 hours  montelukast 10 milliGRAM(s) Oral daily  polyethylene glycol 3350 17 Gram(s) Oral two times a day  senna 2 Tablet(s) Oral at bedtime  sodium chloride 0.9%. 1000 milliLiter(s) (70 mL/Hr) IV Continuous <Continuous>  tiotropium 2.5 MICROgram(s) Inhaler 2 Puff(s) Inhalation daily    MEDICATIONS  (PRN):  acetaminophen     Tablet .. 650 milliGRAM(s) Oral every 6 hours PRN Temp greater or equal to 38C (100.4F), Mild Pain (1 - 3)  albuterol/ipratropium for Nebulization 3 milliLiter(s) Nebulizer every 6 hours PRN Bronchospasm  ondansetron Injectable 4 milliGRAM(s) IV Push every 8 hours PRN Nausea and/or Vomiting        RADIOLOGY & ADDITIONAL TESTS:    < from: CT Abdomen and Pelvis No Cont (04.08.25 @ 21:09) >  Motion limited noncontrast study. Etiology of given clinical symptoms is   not clearly elucidated.    Motion limited evaluation of the pancreas. No clear evidence of acute   peripancreatic inflammation. Correlate with lipase and clinical exam if   there is concern for pancreatitis.    Moderate to severe stool distention of the distal sigmoid colon and   rectum with slight surrounding stranding. Correlate for   constipation/stercoral proctitis.    Drop of air within the endometrial canal of unclear significance.    Left arm contrast extravasation. Marked soft tissue edema of the   partially imaged left upper extremity on the first set of images.   Recommend clinical correlation to exclude compartment syndrome.    < end of copied text >                 Patient is a 89y old  Female with COPD, CHF, AFIB ( on eliquis), DM that comes in for abdominal pain. Patient a poor historian at bedside, unable to reach over the phone to daughter. Patient endoring that today was feeling unwell with weakness, dizziness after physical therapy. Per ED note patient was also noted to be sleepy. On admission, she found to have no fever but CT abd/pelvis shows " Moderate to severe stool distention of the distal sigmoid colon and rectum with slight surrounding stranding. Correlate for constipation/stercoral proctitis." She has started on Ceftriaxone and Flagyl and the ID consult requested to assist with further evaluation and antibiotic management.      REVIEW OF SYSTEMS: Unable to obtain due to mental status unless mentioned in HPI      PAST MEDICAL & SURGICAL HISTORY:  Arthritis  Legally blind  Pre-diabetes  Breast cancer  right, no chemo or radiation  COPD exacerbation  Atrial fibrillation  HF (heart failure)  HFpEF 7/29  Deep vein thrombosis (DVT)  Left Lower Extremity  HLD (hyperlipidemia)  COPD exacerbation  HTN (hypertension)  No significant past surgical history        SOCIAL HISTORY  Alcohol: Does not drink  Tobacco: Does not smoke  Illicit substance use: None      FAMILY HISTORY: Non contributory to the present illness        ALLERGIES : Shrimp (Unknown)  losartan (Angioedema)  Chicken (Stomach Upset)      Vital Signs Last 24 Hrs  T(C): 35.9 (09 Apr 2025 04:44), Max: 36.8 (08 Apr 2025 15:55)  T(F): 96.7 (09 Apr 2025 04:44), Max: 98.2 (08 Apr 2025 15:55)  HR: 76 (09 Apr 2025 04:44) (76 - 85)  BP: 115/83 (09 Apr 2025 04:44) (107/77 - 120/86)  BP(mean): --  RR: 18 (09 Apr 2025 04:44) (18 - 82)  SpO2: 99% (09 Apr 2025 04:44) (96% - 99%)    Parameters below as of 09 Apr 2025 04:44  Patient On (Oxygen Delivery Method): room air        PHYSICAL EXAM:  GENERAL: Not in distress   CHEST/LUNG:  Air entry bilaterally  HEART: s1 and s2 present  ABDOMEN:   Nondistended  EXTREMITIES: No pedal  edema  CNS: Drowsy       LABS:                        13.8   10.17 )-----------( 199      ( 09 Apr 2025 06:35 )             40.1       04-08    134[L]  |  97  |  23[H]  ----------------------------<  186[H]  3.8   |  30  |  1.02    Ca    8.3[L]      08 Apr 2025 18:59  Phos  1.9     04-08  Mg     1.6     04-08    TPro  5.9[L]  /  Alb  2.7[L]  /  TBili  0.6  /  DBili  x   /  AST  16  /  ALT  29  /  AlkPhos  146[H]  04-08        CAPILLARY BLOOD GLUCOSE  POCT Blood Glucose.: 180 mg/dL (08 Apr 2025 15:53)    ABG - ( 08 Apr 2025 22:05 )  pH, Arterial: 7.48  pH, Blood: x     /  pCO2: 39    /  pO2: 97    / HCO3: 29    / Base Excess: 5.2   /  SaO2: 98              MEDICATIONS  (STANDING):  apixaban 5 milliGRAM(s) Oral every 12 hours  atorvastatin 10 milliGRAM(s) Oral at bedtime  carvedilol 6.25 milliGRAM(s) Oral every 12 hours  cefTRIAXone   IVPB 1000 milliGRAM(s) IV Intermittent every 24 hours  famotidine    Tablet 20 milliGRAM(s) Oral daily  fluticasone propionate/ salmeterol 100-50 MICROgram(s) Diskus 1 Dose(s) Inhalation two times a day  insulin glargine Injectable (LANTUS) 7 Unit(s) SubCutaneous at bedtime  insulin lispro (ADMELOG) corrective regimen sliding scale   SubCutaneous three times a day before meals  insulin lispro (ADMELOG) corrective regimen sliding scale   SubCutaneous three times a day before meals  insulin lispro (ADMELOG) corrective regimen sliding scale   SubCutaneous at bedtime  metroNIDAZOLE  IVPB 500 milliGRAM(s) IV Intermittent every 12 hours  montelukast 10 milliGRAM(s) Oral daily  polyethylene glycol 3350 17 Gram(s) Oral two times a day  senna 2 Tablet(s) Oral at bedtime  sodium chloride 0.9%. 1000 milliLiter(s) (70 mL/Hr) IV Continuous <Continuous>  tiotropium 2.5 MICROgram(s) Inhaler 2 Puff(s) Inhalation daily    MEDICATIONS  (PRN):  acetaminophen     Tablet .. 650 milliGRAM(s) Oral every 6 hours PRN Temp greater or equal to 38C (100.4F), Mild Pain (1 - 3)  albuterol/ipratropium for Nebulization 3 milliLiter(s) Nebulizer every 6 hours PRN Bronchospasm  ondansetron Injectable 4 milliGRAM(s) IV Push every 8 hours PRN Nausea and/or Vomiting        RADIOLOGY & ADDITIONAL TESTS:    4/8/25: CT Abdomen and Pelvis No Cont (04.08.25 @ 21:09) Motion limited noncontrast study. Etiology of given clinical symptoms is not clearly elucidated.    Motion limited evaluation of the pancreas. No clear evidence of acute peripancreatic inflammation. Correlate with lipase and clinical exam if   there is concern for pancreatitis.    Moderate to severe stool distention of the distal sigmoid colon and rectum with slight surrounding stranding. Correlate for constipation/stercoral proctitis.    Drop of air within the endometrial canal of unclear significance.    Left arm contrast extravasation. Marked soft tissue edema of the partially imaged left upper extremity on the first set of images.   Recommend clinical correlation to exclude compartment syndrome.    4/4/25: Xray Chest 1 View- PORTABLE-Urgent (Xray Chest 1 View- PORTABLE-Urgent .) (04.04.25 @ 18:35) >  HEART:difficult to access in this projection.  LUNGS: free of consolidation,effusion, or pneumothorax..  BONES: degenerative changes      MICROBIOLOGY DATA:    FluA/FluB/RSV/COVID PCR (04.04.25 @ 18:29)   SARS-CoV-2 Result: Detected:

## 2025-04-09 NOTE — H&P ADULT - PROBLEM SELECTOR PLAN 2
hx of HTN on oral meds  -noted BP in the soft side   -will continue coreg and hold lasixs, and hydralazine   -can restart as indicated   -dash diet

## 2025-04-09 NOTE — PROGRESS NOTE ADULT - ASSESSMENT
Patient is a 89 yo F COPD, CHF, AFIB ( on eliquis), DM that comes in for abdominal pain. CT concerning for constipation/stercoral proctitis. started on ceftriaxone and flagyl. Found to have covid + on PCR. Started on remdesevir  per ID recs.

## 2025-04-09 NOTE — PROGRESS NOTE ADULT - PROBLEM SELECTOR PLAN 1
-P/w abdominal pain, weakness and dizziness   -denies any other systemic symptoms such as fevers, chills   -per patient (not the best historian) has been defecating consistently   -CT in the ED showing Moderate to severe stool distention of the distal sigmoid colon and rectum with slight surrounding stranding. Correlate for constipation/stercoral proctitis.  - cont ctx and flagyl   - Id Dr AMin

## 2025-04-09 NOTE — H&P ADULT - HISTORY OF PRESENT ILLNESS
Patient is a 89 yo F COPD, CHF, AFIB ( on eliquis), DM that comes in for abdmoinal pain. Patient a poor historian at bedside, unable to reach over the phone to daughter. Pateint endoring that today was feeling unwell with weakness, dizziness after physical therapy. Per ED note patient was also noted to be sleepy. Pateint endorses that has been going to the bathroom to poop consistently, hard stools, last one this morning. Patient denies any other associated symptoms such as SOB,CP,N/V/D or any urinary symptoms.

## 2025-04-09 NOTE — PROGRESS NOTE ADULT - SUBJECTIVE AND OBJECTIVE BOX
Patient is a 89y old  Female who presents with a chief complaint of Colitis (09 Apr 2025 12:59)      INTERVAL HPI/OVERNIGHT EVENTS: no acute events overnight, pt lactate elevated likely due to albuterol. Pt refusing to let staff draw blood . will add to am labs.        REVIEW OF SYSTEMS: unable to assess due to pt lethargic at times     T(C): 36.4 (04-09-25 @ 12:29), Max: 36.5 (04-08-25 @ 21:18)  HR: 91 (04-09-25 @ 12:29) (76 - 91)  BP: 129/98 (04-09-25 @ 12:29) (109/76 - 129/98)  RR: 18 (04-09-25 @ 12:29) (18 - 18)  SpO2: 99% (04-09-25 @ 12:29) (96% - 99%)  Wt(kg): --Vital Signs Last 24 Hrs  T(C): 36.4 (09 Apr 2025 12:29), Max: 36.5 (08 Apr 2025 21:18)  T(F): 97.5 (09 Apr 2025 12:29), Max: 97.7 (08 Apr 2025 21:18)  HR: 91 (09 Apr 2025 12:29) (76 - 91)  BP: 129/98 (09 Apr 2025 12:29) (109/76 - 129/98)  BP(mean): --  RR: 18 (09 Apr 2025 12:29) (18 - 18)  SpO2: 99% (09 Apr 2025 12:29) (96% - 99%)    Parameters below as of 09 Apr 2025 12:29  Patient On (Oxygen Delivery Method): room air      MEDICATIONS  (STANDING):  apixaban 5 milliGRAM(s) Oral every 12 hours  atorvastatin 10 milliGRAM(s) Oral at bedtime  carvedilol 6.25 milliGRAM(s) Oral every 12 hours  cefTRIAXone   IVPB 1000 milliGRAM(s) IV Intermittent every 24 hours  famotidine    Tablet 20 milliGRAM(s) Oral daily  fluticasone propionate/ salmeterol 100-50 MICROgram(s) Diskus 1 Dose(s) Inhalation two times a day  insulin glargine Injectable (LANTUS) 7 Unit(s) SubCutaneous at bedtime  insulin lispro (ADMELOG) corrective regimen sliding scale   SubCutaneous three times a day before meals  insulin lispro (ADMELOG) corrective regimen sliding scale   SubCutaneous at bedtime  metroNIDAZOLE  IVPB 500 milliGRAM(s) IV Intermittent every 12 hours  montelukast 10 milliGRAM(s) Oral daily  polyethylene glycol 3350 17 Gram(s) Oral two times a day  remdesivir  IVPB   IV Intermittent   senna 2 Tablet(s) Oral at bedtime  sodium chloride 0.9%. 1000 milliLiter(s) (70 mL/Hr) IV Continuous <Continuous>  tiotropium 2.5 MICROgram(s) Inhaler 2 Puff(s) Inhalation daily    MEDICATIONS  (PRN):  acetaminophen     Tablet .. 650 milliGRAM(s) Oral every 6 hours PRN Temp greater or equal to 38C (100.4F), Mild Pain (1 - 3)  albuterol/ipratropium for Nebulization 3 milliLiter(s) Nebulizer every 6 hours PRN Bronchospasm  ondansetron Injectable 4 milliGRAM(s) IV Push every 8 hours PRN Nausea and/or Vomiting    PHYSICAL EXAM:  GENERAL: NAD  EYES: clear conjunctiva; EOMI  ENMT: Moist mucous membranes  NECK: Supple, No JVD, Normal thyroid  CHEST/LUNG: Clear to auscultation bilaterally; No rales, rhonchi, wheezing, or rubs  HEART: S1, S2, Regular rate and rhythm  ABDOMEN: Soft, Nontender, Nondistended; Bowel sounds present  NEURO: lethargic arousable   EXTREMITIES: No LE edema, no calf tenderness  LYMPH: No lymphadenopathy noted  SKIN: No rashes or lesions    Consultant(s) Notes Reviewed:  [x ] YES  [ ] NO  Care Discussed with Consultants/Other Providers [ x] YES  [ ] NO    LABS:                        13.8   10.17 )-----------( 199      ( 09 Apr 2025 06:35 )             40.1     04-09    134[L]  |  97  |  21[H]  ----------------------------<  164[H]  3.8   |  28  |  0.78    Ca    8.0[L]      09 Apr 2025 06:35  Phos  2.5     04-09  Mg     2.0     04-09    TPro  5.9[L]  /  Alb  2.7[L]  /  TBili  0.6  /  DBili  x   /  AST  16  /  ALT  29  /  AlkPhos  146[H]  04-08      CAPILLARY BLOOD GLUCOSE      POCT Blood Glucose.: 120 mg/dL (09 Apr 2025 16:49)  POCT Blood Glucose.: 172 mg/dL (09 Apr 2025 13:28)  POCT Blood Glucose.: 276 mg/dL (09 Apr 2025 12:04)  POCT Blood Glucose.: 181 mg/dL (09 Apr 2025 07:52)      ABG - ( 08 Apr 2025 22:05 )  pH, Arterial: 7.48  pH, Blood: x     /  pCO2: 39    /  pO2: 97    / HCO3: 29    / Base Excess: 5.2   /  SaO2: 98          Urinalysis Basic - ( 09 Apr 2025 06:35 )    Color: x / Appearance: x / SG: x / pH: x  Gluc: 164 mg/dL / Ketone: x  / Bili: x / Urobili: x   Blood: x / Protein: x / Nitrite: x   Leuk Esterase: x / RBC: x / WBC x   Sq Epi: x / Non Sq Epi: x / Bacteria: x        RADIOLOGY & ADDITIONAL TESTS:    Imaging Personally Reviewed:  [x ] YES  [ ] NO    < from: CT Abdomen and Pelvis No Cont (04.08.25 @ 21:09) >    ACC: [id number] EXAM:  CT ABDOMEN AND PELVIS   ORDERED BY: KEERTHI CRUZ     ACC: [id number] EXAM:  CT ABDOMEN AND PELVIS IC   ORDERED BY: KEERTHI CRUZ     PROCEDURE DATE:  04/08/2025          INTERPRETATION:  CLINICAL INFORMATION: Epigastric pain. Abdominal pain.   Hyperglycemia. Per EMR: "89F, pmh of COPD, CHF, AFIB ( on eliquis),   presenting with abdominal pain. History provided by patient's daughter   and granddaughter at bedside. Was feeling well earlier today but when the   physical therapist came she began to complain that she "did not feel   well". Patient now complains that she is sleepy. Also reporting abdominal   pain. No fever, cough, nausea vomiting. Family reports when they checked   the patient's blood sugar it was over 400at the time."    COMPARISON: CT abdomen pelvis 9/13/2019    CONTRAST/COMPLICATIONS:  IV Contrast: Omnipaque 350 (accession [id number]), NONE (accession   [id number])  90 cc administered   10 cc discarded  Oral Contrast: NONE  Complication: Extravasation (accession [id number]), . (accession [id number])    PROCEDURE:  CT of the Abdomen and Pelvis was performed.  Sagittal and coronal reformats were performed.  Scan was repeated due to incomplete imaging of the pelvis on initial scan.  Left arm contrast extravasation.    FINDINGS:    LOWER CHEST: No visualized pleural effusion. Dependent lung scarring of   the imaged thorax. Partially imaged heart is enlarged with aortic valve   calcification. Elevated right hemidiaphragm.    Solid organ evaluation limited due to noncontrast technique and motion.    LIVER: Subcentimeter hepatic hypodensities too small to characterize.  BILE DUCTS: No distention  GALLBLADDER: Unremarkable CT appearance  SPLEEN: Spleen size within normal limits  PANCREAS: Motion limited evaluation of the pancreas. No clear evidence of   acute peripancreatic inflammation. Correlate with lipase and clinical   exam if there is concern for pancreatitis.  ADRENALS: Unremarkable  KIDNEYS/URETERS: No hydronephrosis or obstructing ureteralcalculus.   Right renal cyst.    BLADDER: Minimally distended.  REPRODUCTIVE ORGANS: Small amount of air within the endometrial canal, of   unclear significance, image 67 series 6. Uterus and adnexa are otherwise   suboptimally characterized on CT. Old right adnexal dermoid cysts   measuring 3.1 cm on image 112 series 2 and 4.1 cm image 106 series 2.    BOWEL: Stomach is underdistended. No small bowel distention. Appendix is   not obstructed. Overall mild stool burden of the colon and overall under   distention limits a violation of the colonic mucosa. Moderate to severe   stool distention of the distal sigmoid colon and rectum with slight   surrounding stranding. Correlate for constipation/stercoral proctitis.   Colonic diverticulosis, without diverticulitis.  PERITONEUM/RETROPERITONEUM: No ascites  VESSELS: No abdominal aortic aneurysm. Aortoiliac calcified plaque  LYMPH NODES: No enlarged lymph nodes by CT size criteria  ABDOMINAL WALL: Tiny fat-containing umbilical hernia  BONES: Degenerative changes of the bones including multilevel spinal   spondylosis. Central canal and neural foramina are not adequately   assessed on this study. Extensive remodeling and arthrosis of the right   hip. Hypertrophic trabeculated appearance of the right hemipelvis likely   due to underlying Paget's disease.    IMPRESSION:    Motion limited noncontrast study. Etiology of given clinical symptoms is   not clearly elucidated.    Motion limited evaluation of the pancreas. No clear evidence of acute   peripancreatic inflammation. Correlate with lipase and clinical exam if   there is concern for pancreatitis.    Moderate to severe stool distention of the distal sigmoid colon and   rectum with slight surrounding stranding. Correlate for   constipation/stercoral proctitis.    Drop of air within the endometrial canal of unclear significance.    Left arm contrast extravasation. Marked soft tissue edema of the   partially imaged left upper extremity on the first set of images.   Recommend clinical correlation to exclude compartment syndrome.    --- End of Report ---            AGUSTINA GUTHRIE M.D., ATTENDING RADIOLOGIST  This document has been electronically signed. Apr 8 2025  9:35PM    < end of copied text >

## 2025-04-09 NOTE — H&P ADULT - NSHPPHYSICALEXAM_GEN_ALL_CORE
T(C): 36.3 (04-09-25 @ 00:12), Max: 36.8 (04-08-25 @ 15:55)  HR: 83 (04-09-25 @ 00:12) (79 - 85)  BP: 109/76 (04-09-25 @ 00:12) (107/77 - 120/86)  RR: 18 (04-09-25 @ 00:12) (18 - 82)  SpO2: 96% (04-09-25 @ 00:12) (96% - 97%)    GENERAL: NAD  HEAD:  Atraumatic, Normocephalic  EYES:  conjunctiva and sclera clear  NECK: Supple, No JVD, Normal thyroid  CHEST/LUNG: Clear to auscultation. Clear to percussion bilaterally; No rales, rhonchi, wheezing, or rubs  HEART: Regular rate and rhythm; No murmurs, rubs, or gallops  ABDOMEN: tenderness to palpation in the lower quadrants and the epigastrium   NERVOUS SYSTEM:  no focal deficits noted   EXTREMITIES:  2+ Peripheral Pulses, No clubbing, cyanosis, or edema  SKIN: warm dry

## 2025-04-09 NOTE — H&P ADULT - NSHPREVIEWOFSYSTEMS_GEN_ALL_CORE
UNABLE TO PROPERLY OBTAIN  - CONSTITUTIONAL: Denies fever and chills  - HEENT: Denies changes in vision and hearing.  - RESPIRATORY: Denies SOB and cough.  - CV: Denies chest pain and palpitations  - GI: abdominal pain   - : Denies dysuria and urinary frequency.  - SKIN: Denies rash and pruritus.  - NEUROLOGICAL: dizziness, weakness   - PSYCHIATRIC: Denies recent changes in mood. Denies anxiety and depression.

## 2025-04-09 NOTE — CONSULT NOTE ADULT - SUBJECTIVE AND OBJECTIVE BOX
Date of Service  04-09-25 @ 12:59    CHIEF COMPLAINT:Patient is a 89y old  Female who presents with a chief complaint of Colitis (09 Apr 2025 07:07)      HPI:  Patient is a 89 yo F COPD, CHF, AFIB ( on eliquis), DM that comes in for abdmoinal pain. Patient a poor historian at bedside, unable to reach over the phone to daughter. Pateint endoring that today was feeling unwell with weakness, dizziness after physical therapy. Per ED note patient was also noted to be sleepy. Pateint endorses that has been going to the bathroom to poop consistently, hard stools, last one this morning. Patient denies any other associated symptoms such as SOB,CP,N/V/D or any urinary symptoms.  (09 Apr 2025 01:38)      PAST MEDICAL & SURGICAL HISTORY:  Arthritis      Legally blind      Pre-diabetes      Breast cancer  right, no chemo or radiation      COPD exacerbation      Atrial fibrillation      HF (heart failure)  HFpEF 7/29      Deep vein thrombosis (DVT)  Left Lower Extremity      HLD (hyperlipidemia)      COPD exacerbation      HTN (hypertension)      No significant past surgical history          MEDICATIONS  (STANDING):  apixaban 5 milliGRAM(s) Oral every 12 hours  atorvastatin 10 milliGRAM(s) Oral at bedtime  carvedilol 6.25 milliGRAM(s) Oral every 12 hours  cefTRIAXone   IVPB 1000 milliGRAM(s) IV Intermittent every 24 hours  famotidine    Tablet 20 milliGRAM(s) Oral daily  fluticasone propionate/ salmeterol 100-50 MICROgram(s) Diskus 1 Dose(s) Inhalation two times a day  insulin glargine Injectable (LANTUS) 7 Unit(s) SubCutaneous at bedtime  insulin lispro (ADMELOG) corrective regimen sliding scale   SubCutaneous three times a day before meals  insulin lispro (ADMELOG) corrective regimen sliding scale   SubCutaneous at bedtime  metroNIDAZOLE  IVPB 500 milliGRAM(s) IV Intermittent every 12 hours  montelukast 10 milliGRAM(s) Oral daily  polyethylene glycol 3350 17 Gram(s) Oral two times a day  senna 2 Tablet(s) Oral at bedtime  sodium chloride 0.9%. 1000 milliLiter(s) (70 mL/Hr) IV Continuous <Continuous>  tiotropium 2.5 MICROgram(s) Inhaler 2 Puff(s) Inhalation daily    MEDICATIONS  (PRN):  acetaminophen     Tablet .. 650 milliGRAM(s) Oral every 6 hours PRN Temp greater or equal to 38C (100.4F), Mild Pain (1 - 3)  albuterol/ipratropium for Nebulization 3 milliLiter(s) Nebulizer every 6 hours PRN Bronchospasm  ondansetron Injectable 4 milliGRAM(s) IV Push every 8 hours PRN Nausea and/or Vomiting      FAMILY HISTORY:  FH: HTN (hypertension)        SOCIAL HISTORY:    [ ] Non-smoker  [ ] Smoker  [ ] Alcohol    Allergies    Shrimp (Unknown)  losartan (Angioedema)    Intolerances    Chicken (Stomach Upset)  	    REVIEW OF SYSTEMS:  CONSTITUTIONAL: No fever, weight loss, or fatigue  EYES: No eye pain, visual disturbances, or discharge  ENT:  No difficulty hearing, tinnitus, vertigo; No sinus or throat pain  NECK: No pain or stiffness  RESPIRATORY: No cough, wheezing, chills or hemoptysis; No Shortness of Breath  CARDIOVASCULAR: No chest pain, palpitations, passing out, dizziness, or leg swelling  GASTROINTESTINAL: No abdominal or epigastric pain. No nausea, vomiting, or hematemesis; No diarrhea or constipation. No melena or hematochezia.  GENITOURINARY: No dysuria, frequency, hematuria, or incontinence  NEUROLOGICAL: No headaches, memory loss, loss of strength, numbness, or tremors  SKIN: No itching, burning, rashes, or lesions   LYMPH Nodes: No enlarged glands  ENDOCRINE: No heat or cold intolerance; No hair loss  MUSCULOSKELETAL: No joint pain or swelling; No muscle, back, or extremity pain  PSYCHIATRIC: No depression, anxiety, mood swings, or difficulty sleeping  HEME/LYMPH: No easy bruising, or bleeding gums  ALLERGY AND IMMUNOLOGIC: No hives or eczema	    [ ] All others negative	  [ ] Unable to obtain    PHYSICAL EXAM:  T(C): 36.4 (04-09-25 @ 12:29), Max: 36.8 (04-08-25 @ 15:55)  HR: 91 (04-09-25 @ 12:29) (76 - 91)  BP: 129/98 (04-09-25 @ 12:29) (107/77 - 129/98)  RR: 18 (04-09-25 @ 12:29) (18 - 82)  SpO2: 99% (04-09-25 @ 12:29) (96% - 99%)  Wt(kg): --  I&O's Summary      Appearance: Normal	  HEENT:   Normal oral mucosa, PERRL, EOMI	  Lymphatic: No lymphadenopathy  Cardiovascular: Normal S1 S2, No JVD, No murmurs, No edema  Respiratory: Lungs clear to auscultation	  Psychiatry: A & O x 3, Mood & affect appropriate  Gastrointestinal:  Soft, Non-tender, + BS	  Skin: No rashes, No ecchymoses, No cyanosis	  Neurologic: Non-focal  Extremities: Normal range of motion, No clubbing, cyanosis or edema  Vascular: Peripheral pulses palpable 2+ bilaterally    TELEMETRY: 	    ECG:  	  RADIOLOGY:  OTHER: 	  	  LABS:	 	    CARDIAC MARKERS:                              13.8   10.17 )-----------( 199      ( 09 Apr 2025 06:35 )             40.1     04-09    134[L]  |  97  |  21[H]  ----------------------------<  164[H]  3.8   |  28  |  0.78    Ca    8.0[L]      09 Apr 2025 06:35  Phos  2.5     04-09  Mg     2.0     04-09    TPro  5.9[L]  /  Alb  2.7[L]  /  TBili  0.6  /  DBili  x   /  AST  16  /  ALT  29  /  AlkPhos  146[H]  04-08    proBNP:   Lipid Profile:   HgA1c:   TSH:     PREVIOUS DIAGNOSTIC TESTING:    [ ] Echocardiogram:  [ ]  Catheterization:  [ ] Stress Test:         Date of Service  04-09-25 @ 12:59    CHIEF COMPLAINT:Patient is a 89y old  Female who presents with a chief complaint of Colitis .      HPI:  Patient is a 89 yo F COPD, CHF, AFIB ( on eliquis), DM that comes in for abdominal pain. Patient a poor historian at bedside, unable to reach over the phone to daughter. Pateint endoring that today was feeling unwell with weakness, dizziness after physical therapy. Per ED note patient was also noted to be sleepy. Patient endorses that has been going to the bathroom to poop consistently, hard stools, last one this morning. Patient denies any other associated symptoms such as SOB,CP,N/V/D or any urinary symptoms.      PAST MEDICAL & SURGICAL HISTORY:  Arthritis      Legally blind      Pre-diabetes      Breast cancer  right, no chemo or radiation      COPD exacerbation      Atrial fibrillation      HF (heart failure)  HFpEF 7/29      Deep vein thrombosis (DVT)  Left Lower Extremity      HLD (hyperlipidemia)      COPD exacerbation      HTN (hypertension)              MEDICATIONS  (STANDING):  apixaban 5 milliGRAM(s) Oral every 12 hours  atorvastatin 10 milliGRAM(s) Oral at bedtime  carvedilol 6.25 milliGRAM(s) Oral every 12 hours  cefTRIAXone   IVPB 1000 milliGRAM(s) IV Intermittent every 24 hours  famotidine    Tablet 20 milliGRAM(s) Oral daily  fluticasone propionate/ salmeterol 100-50 MICROgram(s) Diskus 1 Dose(s) Inhalation two times a day  insulin glargine Injectable (LANTUS) 7 Unit(s) SubCutaneous at bedtime  insulin lispro (ADMELOG) corrective regimen sliding scale   SubCutaneous three times a day before meals  insulin lispro (ADMELOG) corrective regimen sliding scale   SubCutaneous at bedtime  metroNIDAZOLE  IVPB 500 milliGRAM(s) IV Intermittent every 12 hours  montelukast 10 milliGRAM(s) Oral daily  polyethylene glycol 3350 17 Gram(s) Oral two times a day  senna 2 Tablet(s) Oral at bedtime  sodium chloride 0.9%. 1000 milliLiter(s) (70 mL/Hr) IV Continuous <Continuous>  tiotropium 2.5 MICROgram(s) Inhaler 2 Puff(s) Inhalation daily    MEDICATIONS  (PRN):  acetaminophen     Tablet .. 650 milliGRAM(s) Oral every 6 hours PRN Temp greater or equal to 38C (100.4F), Mild Pain (1 - 3)  albuterol/ipratropium for Nebulization 3 milliLiter(s) Nebulizer every 6 hours PRN Bronchospasm  ondansetron Injectable 4 milliGRAM(s) IV Push every 8 hours PRN Nausea and/or Vomiting      FAMILY HISTORY:  FH: HTN (hypertension)        SOCIAL HISTORY:    [x ] Non-smoker    [x ] Alcohol-denies    Allergies    Shrimp (Unknown)  losartan (Angioedema)    Intolerances    Chicken (Stomach Upset)  	    REVIEW OF SYSTEMS:  CONSTITUTIONAL: No fever, weight loss, or fatigue  EYES: No eye pain, visual disturbances, or discharge  ENT:  No difficulty hearing, tinnitus, vertigo; No sinus or throat pain  NECK: No pain or stiffness  RESPIRATORY: No cough, wheezing, chills or hemoptysis; No Shortness of Breath  CARDIOVASCULAR: No chest pain, palpitations, passing out, dizziness, or leg swelling  GASTROINTESTINAL: No abdominal or epigastric pain. No nausea, vomiting, or hematemesis; No diarrhea or constipation. No melena or hematochezia.  GENITOURINARY: No dysuria, frequency, hematuria, or incontinence  NEUROLOGICAL: No headaches, memory loss, loss of strength, numbness, or tremors  SKIN: No itching, burning, rashes, or lesions   LYMPH Nodes: No enlarged glands  ENDOCRINE: No heat or cold intolerance; No hair loss  MUSCULOSKELETAL: No joint pain or swelling; No muscle, back, or extremity pain  PSYCHIATRIC: No depression, anxiety, mood swings, or difficulty sleeping  HEME/LYMPH: No easy bruising, or bleeding gums  ALLERGY AND IMMUNOLOGIC: No hives or eczema	      PHYSICAL EXAM:  T(C): 36.4 (04-09-25 @ 12:29), Max: 36.8 (04-08-25 @ 15:55)  HR: 91 (04-09-25 @ 12:29) (76 - 91)  BP: 129/98 (04-09-25 @ 12:29) (107/77 - 129/98)  RR: 18 (04-09-25 @ 12:29) (18 - 82)  SpO2: 99% (04-09-25 @ 12:29) (96% - 99%)  Wt(kg): --  I&O's Summary      Appearance: Normal	  HEENT:   Normal oral mucosa, PERRL, EOMI	  Lymphatic: No lymphadenopathy  Cardiovascular: Normal S1 S2, No JVD, No murmurs, No edema  Respiratory: Lungs clear to auscultation	  Psychiatry: A & O x 3, Mood & affect appropriate  Gastrointestinal:  Soft, Non-tender, + BS	  Skin: No rashes, No ecchymoses, No cyanosis	  Neurologic: Non-focal  Extremities: Normal range of motion, No clubbing, cyanosis or edema  Vascular: Peripheral pulses palpable 2+ bilaterally      	  LABS:	 	                       13.8   10.17 )-----------( 199      ( 09 Apr 2025 06:35 )             40.1     04-09    134[L]  |  97  |  21[H]  ----------------------------<  164[H]  3.8   |  28  |  0.78    Ca    8.0[L]      09 Apr 2025 06:35  Phos  2.5     04-09  Mg     2.0     04-09    TPro  5.9[L]  /  Alb  2.7[L]  /  TBili  0.6  /  DBili  x   /  AST  16  /  ALT  29  /  AlkPhos  146[H]  04-08    covid+.    < from: CT Abdomen and Pelvis No Cont (04.08.25 @ 21:09) >  ACC: [id number] EXAM:  CT ABDOMEN AND PELVIS   ORDERED BY: KEERTHI CRUZ     ACC: [id number] EXAM:  CT ABDOMEN AND PELVIS IC   ORDERED BY: KEERTHI CRUZ     PROCEDURE DATE:  04/08/2025          INTERPRETATION:  CLINICAL INFORMATION: Epigastric pain. Abdominal pain.   Hyperglycemia. Per EMR: "89F, pmh of COPD, CHF, AFIB ( on eliquis),   presenting with abdominal pain. History provided by patient's daughter   and granddaughter at bedside. Was feeling well earlier today but when the   physical therapist came she began to complain that she "did not feel   well". Patient now complains that she is sleepy. Also reporting abdominal   pain. No fever, cough, nausea vomiting. Family reports when they checked   the patient's blood sugar it was over 400at the time."    COMPARISON: CT abdomen pelvis 9/13/2019    CONTRAST/COMPLICATIONS:  IV Contrast: Omnipaque 350 (accession [id number]), NONE (accession   [id number])  90 cc administered   10 cc discarded  Oral Contrast: NONE  Complication: Extravasation (accession [id number]), . (accession [id number])    PROCEDURE:  CT of the Abdomen and Pelvis was performed.  Sagittal and coronal reformats were performed.  Scan was repeated due to incomplete imaging of the pelvis on initial scan.  Left arm contrast extravasation.    FINDINGS:    LOWER CHEST: No visualized pleural effusion. Dependent lung scarring of   the imaged thorax. Partially imaged heart is enlarged with aortic valve   calcification. Elevated right hemidiaphragm.    Solid organ evaluation limited due to noncontrast technique and motion.    LIVER: Subcentimeter hepatic hypodensities too small to characterize.  BILE DUCTS: No distention  GALLBLADDER: Unremarkable CT appearance  SPLEEN: Spleen size within normal limits  PANCREAS: Motion limited evaluation of the pancreas. No clear evidence of   acute peripancreatic inflammation. Correlate with lipase and clinical   exam if there is concern for pancreatitis.  ADRENALS: Unremarkable  KIDNEYS/URETERS: No hydronephrosis or obstructing ureteralcalculus.   Right renal cyst.    BLADDER: Minimally distended.  REPRODUCTIVE ORGANS: Small amount of air within the endometrial canal, of   unclear significance, image 67 series 6. Uterus and adnexa are otherwise   suboptimally characterized on CT. Old right adnexal dermoid cysts   measuring 3.1 cm on image 112 series 2 and 4.1 cm image 106 series 2.    BOWEL: Stomach is underdistended. No small bowel distention. Appendix is   not obstructed. Overall mild stool burden of the colon and overall under   distention limits a violation of the colonic mucosa. Moderate to severe   stool distention of the distal sigmoid colon and rectum with slight   surrounding stranding. Correlate for constipation/stercoral proctitis.   Colonic diverticulosis, without diverticulitis.  PERITONEUM/RETROPERITONEUM: No ascites  VESSELS: No abdominal aortic aneurysm. Aortoiliac calcified plaque  LYMPH NODES: No enlarged lymph nodes by CT size criteria  ABDOMINAL WALL: Tiny fat-containing umbilical hernia  BONES: Degenerative changes of the bones including multilevel spinal   spondylosis. Central canal and neural foramina are not adequately   assessed on this study. Extensive remodeling and arthrosis of the right   hip. Hypertrophic trabeculated appearance of the right hemipelvis likely   due to underlying Paget's disease.    IMPRESSION:    Motion limited noncontrast study. Etiology of given clinical symptoms is   not clearly elucidated.    Motion limited evaluation of the pancreas. No clear evidence of acute   peripancreatic inflammation. Correlate with lipase and clinical exam if   there is concern for pancreatitis.    Moderate to severe stool distention of the distal sigmoid colon and   rectum with slight surrounding stranding. Correlate for   constipation/stercoral proctitis.    Drop of air within the endometrial canal of unclear significance.    Left arm contrast extravasation. Marked soft tissue edema of the   partially imaged left upper extremity on the first set of images.   Recommend clinical correlation to exclude compartment syndrome.    --- End of Report ---            AGUSTINA GUTHRIE M.D., ATTENDING RADIOLOGIST  This document has been electronically signed. Apr 8 2025  9:35PM    < end of copied text >

## 2025-04-09 NOTE — H&P ADULT - NSICDXPASTMEDICALHX_GEN_ALL_CORE_FT
PAST MEDICAL HISTORY:  Arthritis     Atrial fibrillation     Breast cancer right, no chemo or radiation    COPD exacerbation     COPD exacerbation     Deep vein thrombosis (DVT) Left Lower Extremity    HF (heart failure) HFpEF 7/29    HLD (hyperlipidemia)     HTN (hypertension)     Legally blind     Pre-diabetes

## 2025-04-09 NOTE — H&P ADULT - PROBLEM SELECTOR PLAN 1
-patient with a 1 day hx of abdominal pain, weakness and dizziness   -denies any other systemic symptoms such as fevers, chills   -per patient (not the best historian) has been defecating consistently   -CT in the ED showing Moderate to severe stool distention of the distal sigmoid colon and rectum with slight surrounding stranding. Correlate for constipation/stercoral proctitis.  -to start patient on ctx and flagyl   -giving senna qd and miralax bid   -IV fluids   -noted with a lactate at 3.6  -f/u repeat lactate

## 2025-04-09 NOTE — CONSULT NOTE ADULT - ASSESSMENT
Patient is a 89y old  Female with COPD, CHF, AFIB ( on eliquis), DM that comes in for abdominal pain. Patient a poor historian at bedside, unable to reach over the phone to daughter. Patient endoring that today was feeling unwell with weakness, dizziness after physical therapy. Per ED note patient was also noted to be sleepy. On admission, she found to have no fever but CT abd/pelvis shows " Moderate to severe stool distention of the distal sigmoid colon and rectum with slight surrounding stranding. Correlate for constipation/stercoral proctitis." She has started on Ceftriaxone and Flagyl and the ID consult requested to assist with further evaluation and antibiotic management.    # Constipation/stercoral proctitis  # Positive COVID PCR    would recommend:    1. Aggressive bowel regimen to prevent further worsening of stercoral proctitis  2. Continue Ceftriaxone and Flagyl   3. Consider start Remdesivir for positive COVID PCR  4. Aspiration and COVID precaution  5. Supplemental oxygenation and bronchodilator as needed     will follow the patient with you and make further recommendation based on the clinical course and Lab results  Thank you for the opportunity to participate in Ms. Constantino's care    Attending Attestation:    Spent more than 65 minutes on total encounter, more than 50 % of the visit was spent counseling and/or coordinating care by the Attending physician.      .  
89 yo F COPD, CHF, AFIB ( on eliquis), DM that comes in for abdominal paincolitis,COVID+.  1.Colitis-abx.  2.COVID+-supportive care.  3.Afib-eliquis,coreg.  4.DM-Insulin.  5.COPD-MDI.  6.PPI.

## 2025-04-09 NOTE — H&P ADULT - ASSESSMENT
Patient is a 89 yo F COPD, CHF, AFIB ( on eliquis), DM that comes in for abdmoinal pain. Patient to be admitted for colitis

## 2025-04-10 LAB
ANION GAP SERPL CALC-SCNC: 9 MMOL/L — SIGNIFICANT CHANGE UP (ref 5–17)
BUN SERPL-MCNC: 15 MG/DL — SIGNIFICANT CHANGE UP (ref 7–18)
CALCIUM SERPL-MCNC: 7.9 MG/DL — LOW (ref 8.4–10.5)
CHLORIDE SERPL-SCNC: 99 MMOL/L — SIGNIFICANT CHANGE UP (ref 96–108)
CO2 SERPL-SCNC: 29 MMOL/L — SIGNIFICANT CHANGE UP (ref 22–31)
CREAT SERPL-MCNC: 0.67 MG/DL — SIGNIFICANT CHANGE UP (ref 0.5–1.3)
EGFR: 81 ML/MIN/1.73M2 — SIGNIFICANT CHANGE UP
EGFR: 81 ML/MIN/1.73M2 — SIGNIFICANT CHANGE UP
GLUCOSE BLDC GLUCOMTR-MCNC: 138 MG/DL — HIGH (ref 70–99)
GLUCOSE BLDC GLUCOMTR-MCNC: 147 MG/DL — HIGH (ref 70–99)
GLUCOSE BLDC GLUCOMTR-MCNC: 149 MG/DL — HIGH (ref 70–99)
GLUCOSE BLDC GLUCOMTR-MCNC: 236 MG/DL — HIGH (ref 70–99)
GLUCOSE SERPL-MCNC: 129 MG/DL — HIGH (ref 70–99)
HCT VFR BLD CALC: 40 % — SIGNIFICANT CHANGE UP (ref 34.5–45)
HGB BLD-MCNC: 13.7 G/DL — SIGNIFICANT CHANGE UP (ref 11.5–15.5)
LACTATE SERPL-SCNC: 1.6 MMOL/L — SIGNIFICANT CHANGE UP (ref 0.7–2)
MCHC RBC-ENTMCNC: 31.6 PG — SIGNIFICANT CHANGE UP (ref 27–34)
MCHC RBC-ENTMCNC: 34.3 G/DL — SIGNIFICANT CHANGE UP (ref 32–36)
MCV RBC AUTO: 92.4 FL — SIGNIFICANT CHANGE UP (ref 80–100)
NRBC BLD AUTO-RTO: 0 /100 WBCS — SIGNIFICANT CHANGE UP (ref 0–0)
PLATELET # BLD AUTO: 206 K/UL — SIGNIFICANT CHANGE UP (ref 150–400)
POTASSIUM SERPL-MCNC: 3.1 MMOL/L — LOW (ref 3.5–5.3)
POTASSIUM SERPL-SCNC: 3.1 MMOL/L — LOW (ref 3.5–5.3)
RBC # BLD: 4.33 M/UL — SIGNIFICANT CHANGE UP (ref 3.8–5.2)
RBC # FLD: 13 % — SIGNIFICANT CHANGE UP (ref 10.3–14.5)
SODIUM SERPL-SCNC: 137 MMOL/L — SIGNIFICANT CHANGE UP (ref 135–145)
WBC # BLD: 6.67 K/UL — SIGNIFICANT CHANGE UP (ref 3.8–10.5)
WBC # FLD AUTO: 6.67 K/UL — SIGNIFICANT CHANGE UP (ref 3.8–10.5)

## 2025-04-10 RX ORDER — REMDESIVIR 5 MG/ML
100 INJECTION INTRAVENOUS EVERY 24 HOURS
Refills: 0 | Status: COMPLETED | OUTPATIENT
Start: 2025-04-10 | End: 2025-04-11

## 2025-04-10 RX ADMIN — Medication 100 MILLIGRAM(S): at 06:48

## 2025-04-10 RX ADMIN — INSULIN LISPRO 2: 100 INJECTION, SOLUTION INTRAVENOUS; SUBCUTANEOUS at 12:04

## 2025-04-10 RX ADMIN — APIXABAN 5 MILLIGRAM(S): 2.5 TABLET, FILM COATED ORAL at 17:41

## 2025-04-10 RX ADMIN — POLYETHYLENE GLYCOL 3350 17 GRAM(S): 17 POWDER, FOR SOLUTION ORAL at 06:47

## 2025-04-10 RX ADMIN — Medication 40 MILLIEQUIVALENT(S): at 14:33

## 2025-04-10 RX ADMIN — Medication 1 DOSE(S): at 22:01

## 2025-04-10 RX ADMIN — CARVEDILOL 6.25 MILLIGRAM(S): 3.12 TABLET, FILM COATED ORAL at 06:48

## 2025-04-10 RX ADMIN — MUPIROCIN CALCIUM 1 APPLICATION(S): 20 CREAM TOPICAL at 17:15

## 2025-04-10 RX ADMIN — REMDESIVIR 200 MILLIGRAM(S): 5 INJECTION INTRAVENOUS at 17:15

## 2025-04-10 RX ADMIN — POLYETHYLENE GLYCOL 3350 17 GRAM(S): 17 POWDER, FOR SOLUTION ORAL at 17:40

## 2025-04-10 RX ADMIN — Medication 20 MILLIGRAM(S): at 11:34

## 2025-04-10 RX ADMIN — MUPIROCIN CALCIUM 1 APPLICATION(S): 20 CREAM TOPICAL at 06:33

## 2025-04-10 RX ADMIN — Medication 100 MILLIGRAM(S): at 17:15

## 2025-04-10 RX ADMIN — Medication 40 MILLIEQUIVALENT(S): at 17:40

## 2025-04-10 RX ADMIN — Medication 1 APPLICATION(S): at 06:07

## 2025-04-10 RX ADMIN — Medication 40 MILLIEQUIVALENT(S): at 11:34

## 2025-04-10 RX ADMIN — Medication 4 MILLIGRAM(S): at 00:53

## 2025-04-10 RX ADMIN — Medication 2 TABLET(S): at 22:02

## 2025-04-10 RX ADMIN — MONTELUKAST SODIUM 10 MILLIGRAM(S): 10 TABLET ORAL at 11:34

## 2025-04-10 RX ADMIN — APIXABAN 5 MILLIGRAM(S): 2.5 TABLET, FILM COATED ORAL at 06:47

## 2025-04-10 RX ADMIN — CARVEDILOL 6.25 MILLIGRAM(S): 3.12 TABLET, FILM COATED ORAL at 17:40

## 2025-04-10 RX ADMIN — TIOTROPIUM BROMIDE INHALATION SPRAY 2 PUFF(S): 3.12 SPRAY, METERED RESPIRATORY (INHALATION) at 11:35

## 2025-04-10 RX ADMIN — Medication 1 DOSE(S): at 11:35

## 2025-04-10 RX ADMIN — ATORVASTATIN CALCIUM 10 MILLIGRAM(S): 80 TABLET, FILM COATED ORAL at 22:01

## 2025-04-10 RX ADMIN — CEFTRIAXONE 100 MILLIGRAM(S): 500 INJECTION, POWDER, FOR SOLUTION INTRAMUSCULAR; INTRAVENOUS at 06:03

## 2025-04-10 NOTE — PROGRESS NOTE ADULT - SUBJECTIVE AND OBJECTIVE BOX
Patient is a 89y old  Female who presents with a chief complaint of Colitis (10 Apr 2025 12:51)      INTERVAL HPI/OVERNIGHT EVENTS: pt with no new complaints    MEDICATIONS  (STANDING):  apixaban 5 milliGRAM(s) Oral every 12 hours  atorvastatin 10 milliGRAM(s) Oral at bedtime  carvedilol 6.25 milliGRAM(s) Oral every 12 hours  cefTRIAXone   IVPB 1000 milliGRAM(s) IV Intermittent every 24 hours  chlorhexidine 2% Cloths 1 Application(s) Topical <User Schedule>  famotidine    Tablet 20 milliGRAM(s) Oral daily  fluticasone propionate/ salmeterol 100-50 MICROgram(s) Diskus 1 Dose(s) Inhalation two times a day  insulin glargine Injectable (LANTUS) 7 Unit(s) SubCutaneous at bedtime  insulin lispro (ADMELOG) corrective regimen sliding scale   SubCutaneous three times a day before meals  insulin lispro (ADMELOG) corrective regimen sliding scale   SubCutaneous at bedtime  metroNIDAZOLE  IVPB 500 milliGRAM(s) IV Intermittent every 12 hours  montelukast 10 milliGRAM(s) Oral daily  mupirocin 2% Ointment 1 Application(s) Both Nostrils two times a day  polyethylene glycol 3350 17 Gram(s) Oral two times a day  potassium chloride    Tablet ER 40 milliEquivalent(s) Oral every 4 hours  remdesivir  IVPB 100 milliGRAM(s) IV Intermittent every 24 hours  senna 2 Tablet(s) Oral at bedtime  sodium chloride 0.9%. 1000 milliLiter(s) (70 mL/Hr) IV Continuous <Continuous>  tiotropium 2.5 MICROgram(s) Inhaler 2 Puff(s) Inhalation daily    MEDICATIONS  (PRN):  acetaminophen     Tablet .. 650 milliGRAM(s) Oral every 6 hours PRN Temp greater or equal to 38C (100.4F), Mild Pain (1 - 3)  albuterol/ipratropium for Nebulization 3 milliLiter(s) Nebulizer every 6 hours PRN Bronchospasm  ondansetron Injectable 4 milliGRAM(s) IV Push every 8 hours PRN Nausea and/or Vomiting  __________________________________________________  REVIEW OF SYSTEMS:    CONSTITUTIONAL: No fever,   EYES: no acute visual disturbances  NECK: No pain or stiffness  RESPIRATORY: No cough; No shortness of breath  CARDIOVASCULAR: No chest pain, no palpitations  GASTROINTESTINAL: No pain. No nausea or vomiting; No diarrhea   NEUROLOGICAL: No headache or numbness, no tremors  MUSCULOSKELETAL: No joint pain, no muscle pain  GENITOURINARY: no dysuria, no frequency, no hesitancy  PSYCHIATRY: no depression , no anxiety  ALL OTHER  ROS negative      Vital Signs Last 24 Hrs  T(C): 36.2 (10 Apr 2025 12:39), Max: 36.2 (10 Apr 2025 12:39)  T(F): 97.2 (10 Apr 2025 12:39), Max: 97.2 (10 Apr 2025 12:39)  HR: 79 (10 Apr 2025 12:39) (62 - 92)  BP: 110/71 (10 Apr 2025 12:39) (110/71 - 138/84)  BP(mean): 82 (10 Apr 2025 12:39) (82 - 86)  RR: 18 (10 Apr 2025 12:39) (18 - 18)  SpO2: 99% (10 Apr 2025 12:39) (98% - 99%)    Parameters below as of 10 Apr 2025 12:39  Patient On (Oxygen Delivery Method): room air    ________________________________________________  PHYSICAL EXAM:  GENERAL: NAD  HEENT: Normocephalic;  conjunctivae and sclerae clear; moist mucous membranes;   NECK : supple  CHEST/LUNG: Clear to auscultation bilaterally with good air entry   HEART: S1 S2  regular; no murmurs, gallops or rubs  ABDOMEN: Soft, Nontender, Nondistended; Bowel sounds present  EXTREMITIES: no cyanosis; no edema; no calf tenderness  SKIN: warm and dry; no rash  NERVOUS SYSTEM:  Awake and alert; Oriented  to place, person and time ; no new deficits    _________________________________________________  LABS:                        13.7   6.67  )-----------( 206      ( 10 Apr 2025 07:33 )             40.0     04-10    137  |  99  |  15  ----------------------------<  129[H]  3.1[L]   |  29  |  0.67    Ca    7.9[L]      10 Apr 2025 07:33  Phos  2.5     04-09  Mg     2.0     04-09    TPro  5.9[L]  /  Alb  2.7[L]  /  TBili  0.6  /  DBili  x   /  AST  16  /  ALT  29  /  AlkPhos  146[H]  04-08      Urinalysis Basic - ( 10 Apr 2025 07:33 )    Color: x / Appearance: x / SG: x / pH: x  Gluc: 129 mg/dL / Ketone: x  / Bili: x / Urobili: x   Blood: x / Protein: x / Nitrite: x   Leuk Esterase: x / RBC: x / WBC x   Sq Epi: x / Non Sq Epi: x / Bacteria: x      CAPILLARY BLOOD GLUCOSE      POCT Blood Glucose.: 236 mg/dL (10 Apr 2025 11:43)  POCT Blood Glucose.: 138 mg/dL (10 Apr 2025 07:27)  POCT Blood Glucose.: 143 mg/dL (09 Apr 2025 21:33)  POCT Blood Glucose.: 120 mg/dL (09 Apr 2025 16:49)      RADIOLOGY & ADDITIONAL TESTS:     < from: CT Abdomen and Pelvis No Cont (04.08.25 @ 21:09) >  ACC: [id number] EXAM:  CT ABDOMEN AND PELVIS   ORDERED BY: KEERTHI CRUZ     ACC: [id number] EXAM:  CT ABDOMEN AND PELVIS IC   ORDERED BY: KEERTHI CRUZ     PROCEDURE DATE:  04/08/2025        INTERPRETATION:  CLINICAL INFORMATION: Epigastric pain. Abdominal pain.   Hyperglycemia. Per EMR: "89F, pmh of COPD, CHF, AFIB ( on eliquis),   presenting with abdominal pain. History provided by patient's daughter   and granddaughter at bedside. Was feeling well earlier today but when the   physical therapist came she began to complain that she "did not feel   well". Patient now complains that she is sleepy. Also reporting abdominal   pain. No fever, cough, nausea vomiting. Family reports when they checked   the patient's blood sugar it was over 400at the time."    COMPARISON: CT abdomen pelvis 9/13/2019    CONTRAST/COMPLICATIONS:  IV Contrast: Omnipaque 350 (accession [id number]), NONE (accession   [id number])  90 cc administered   10 cc discarded  Oral Contrast: NONE  Complication: Extravasation (accession [id number]), . (accession [id number])    PROCEDURE:  CT of the Abdomen and Pelvis was performed.  Sagittal and coronal reformats were performed.  Scan was repeated due to incomplete imaging of the pelvis on initial scan.  Left arm contrast extravasation.    FINDINGS:    LOWER CHEST: No visualized pleural effusion. Dependent lung scarring of   the imaged thorax. Partially imaged heart is enlarged with aortic valve   calcification. Elevated right hemidiaphragm.    Solid organ evaluation limited due to noncontrast technique and motion.    LIVER: Subcentimeter hepatic hypodensities too small to characterize.  BILE DUCTS: No distention  GALLBLADDER: Unremarkable CT appearance  SPLEEN: Spleen size within normal limits  PANCREAS: Motion limited evaluation of the pancreas. No clear evidence of   acute peripancreatic inflammation. Correlate with lipase and clinical   exam if there is concern for pancreatitis.  ADRENALS: Unremarkable  KIDNEYS/URETERS: No hydronephrosis or obstructing ureteralcalculus.   Right renal cyst.    BLADDER: Minimally distended.  REPRODUCTIVE ORGANS: Small amount of air within the endometrial canal, of   unclear significance, image 67 series 6. Uterus and adnexa are otherwise   suboptimally characterized on CT. Old right adnexal dermoid cysts   measuring 3.1 cm on image 112 series 2 and 4.1 cm image 106 series 2.    BOWEL: Stomach is underdistended. No small bowel distention. Appendix is   not obstructed. Overall mild stool burden of the colon and overall under   distention limits a violation of the colonic mucosa. Moderate to severe   stool distention of the distal sigmoid colon and rectum with slight   surrounding stranding. Correlate for constipation/stercoral proctitis.   Colonic diverticulosis, without diverticulitis.  PERITONEUM/RETROPERITONEUM: No ascites  VESSELS: No abdominal aortic aneurysm. Aortoiliac calcified plaque  LYMPH NODES: No enlarged lymph nodes by CT size criteria  ABDOMINAL WALL: Tiny fat-containing umbilical hernia  BONES: Degenerative changes of the bones including multilevel spinal   spondylosis. Central canal and neural foramina are not adequately   assessed on this study. Extensive remodeling and arthrosis of the right   hip. Hypertrophic trabeculated appearance of the right hemipelvis likely   due to underlying Paget's disease.    IMPRESSION:    Motion limited noncontrast study. Etiology of given clinical symptoms is   not clearly elucidated.    Motion limited evaluation of the pancreas. No clear evidence of acute   peripancreatic inflammation. Correlate with lipase and clinical exam if   there is concern for pancreatitis.    Moderate to severe stool distention of the distal sigmoid colon and   rectum with slight surrounding stranding. Correlate for   constipation/stercoral proctitis.    Drop of air within the endometrial canal of unclear significance.    Left arm contrast extravasation. Marked soft tissue edema of the   partially imaged left upper extremity on the first set of images.   Recommend clinical correlation to exclude compartment syndrome.    --- End of Report ---    < from: Xray Chest 1 View- PORTABLE-Urgent (Xray Chest 1 View- PORTABLE-Urgent .) (04.04.25 @ 18:35) >  ACC: [id number] EXAM:  XR CHEST PORTABLE URGENT 1V   ORDERED BY: BARNEY BESS     PROCEDURE DATE:  04/04/2025        INTERPRETATION:  HISTORY: ; eval for effusions/pneumonia;  TECHNIQUE: Portable frontal view of the chest, 1 view.  COMPARISON 3/21/2025.  FINDINGS/  IMPRESSION:    HEART:difficult to access in this projection.  LUNGS: free of consolidation,effusion, or pneumothorax..  BONES: degenerative changes    --- End of Report ---    Imaging Personally Reviewed:  YES    Consultant(s) Notes Reviewed:   YES      Plan of care was discussed with patient and /or primary care giver; all questions and concerns were addressed and care was aligned with patient's wishes.

## 2025-04-10 NOTE — PROGRESS NOTE ADULT - SUBJECTIVE AND OBJECTIVE BOX
Date of Service 04-10-25 @ 12:52    CHIEF COMPLAINT:Patient is a 89y old  Female who presents with a chief complaint of Colitis.Pt appears comfortable.    	  REVIEW OF SYSTEMS:  CONSTITUTIONAL: No fever, weight loss, or fatigue  EYES: No eye pain, visual disturbances, or discharge  ENT:  No difficulty hearing, tinnitus, vertigo; No sinus or throat pain  NECK: No pain or stiffness  RESPIRATORY: No cough, wheezing, chills or hemoptysis; No Shortness of Breath  CARDIOVASCULAR: No chest pain, palpitations, passing out, dizziness, or leg swelling  GASTROINTESTINAL: No abdominal or epigastric pain. No nausea, vomiting, or hematemesis; No diarrhea or constipation. No melena or hematochezia.  GENITOURINARY: No dysuria, frequency, hematuria, or incontinence  NEUROLOGICAL: No headaches, memory loss, loss of strength, numbness, or tremors  SKIN: No itching, burning, rashes, or lesions   LYMPH Nodes: No enlarged glands  ENDOCRINE: No heat or cold intolerance; No hair loss  MUSCULOSKELETAL: No joint pain or swelling; No muscle, back, or extremity pain  PSYCHIATRIC: No depression, anxiety, mood swings, or difficulty sleeping  HEME/LYMPH: No easy bruising, or bleeding gums  ALLERGY AND IMMUNOLOGIC: No hives or eczema	      PHYSICAL EXAM:  T(C): 36.2 (04-10-25 @ 12:39), Max: 36.2 (04-10-25 @ 12:39)  HR: 79 (04-10-25 @ 12:39) (62 - 92)  BP: 110/71 (04-10-25 @ 12:39) (110/71 - 138/84)  RR: 18 (04-10-25 @ 12:39) (18 - 18)  SpO2: 99% (04-10-25 @ 12:39) (98% - 99%)  Wt(kg): --  I&O's Summary      Appearance: Normal	  HEENT:   Normal oral mucosa, PERRL, EOMI	  Lymphatic: No lymphadenopathy  Cardiovascular: Normal S1 S2, No JVD, No murmurs, No edema  Respiratory: Lungs clear to auscultation	  Psychiatry: A & O x 3, Mood & affect appropriate  Gastrointestinal:  Soft, Non-tender, + BS	  Skin: No rashes, No ecchymoses, No cyanosis	  Neurologic: Non-focal  Extremities: Normal range of motion, No clubbing, cyanosis or edema  Vascular: Peripheral pulses palpable 2+ bilaterally    MEDICATIONS  (STANDING):  apixaban 5 milliGRAM(s) Oral every 12 hours  atorvastatin 10 milliGRAM(s) Oral at bedtime  carvedilol 6.25 milliGRAM(s) Oral every 12 hours  cefTRIAXone   IVPB 1000 milliGRAM(s) IV Intermittent every 24 hours  chlorhexidine 2% Cloths 1 Application(s) Topical <User Schedule>  famotidine    Tablet 20 milliGRAM(s) Oral daily  fluticasone propionate/ salmeterol 100-50 MICROgram(s) Diskus 1 Dose(s) Inhalation two times a day  insulin glargine Injectable (LANTUS) 7 Unit(s) SubCutaneous at bedtime  insulin lispro (ADMELOG) corrective regimen sliding scale   SubCutaneous three times a day before meals  insulin lispro (ADMELOG) corrective regimen sliding scale   SubCutaneous at bedtime  metroNIDAZOLE  IVPB 500 milliGRAM(s) IV Intermittent every 12 hours  montelukast 10 milliGRAM(s) Oral daily  mupirocin 2% Ointment 1 Application(s) Both Nostrils two times a day  polyethylene glycol 3350 17 Gram(s) Oral two times a day  potassium chloride    Tablet ER 40 milliEquivalent(s) Oral every 4 hours  remdesivir  IVPB 100 milliGRAM(s) IV Intermittent every 24 hours  senna 2 Tablet(s) Oral at bedtime  sodium chloride 0.9%. 1000 milliLiter(s) (70 mL/Hr) IV Continuous <Continuous>  tiotropium 2.5 MICROgram(s) Inhaler 2 Puff(s) Inhalation daily        LABS:	 	                         13.7   6.67  )-----------( 206      ( 10 Apr 2025 07:33 )             40.0     04-10    137  |  99  |  15  ----------------------------<  129[H]  3.1[L]   |  29  |  0.67    Ca    7.9[L]      10 Apr 2025 07:33  Phos  2.5     04-09  Mg     2.0     04-09    TPro  5.9[L]  /  Alb  2.7[L]  /  TBili  0.6  /  DBili  x   /  AST  16  /  ALT  29  /  AlkPhos  146[H]  04-08       TSH: Thyroid Stimulating Hormone, Serum: 1.92 uU/mL (03-24 @ 06:16)

## 2025-04-10 NOTE — PROGRESS NOTE ADULT - ASSESSMENT
89 yo F COPD, CHF, AFIB ( on eliquis), DM that comes in for abdominal paincolitis,COVID+.  1.Colitis-abx.  2.COVID+-supportive care.  3.Afib-eliquis,coreg.  4.DM-Insulin.  5.COPD-MDI.  6.PPI.

## 2025-04-10 NOTE — PROGRESS NOTE ADULT - PROBLEM SELECTOR PLAN 1
P/w abdominal pain, weakness and dizziness   -denies any other systemic symptoms such as fevers, chills   -pt reports regular bowel movements   -CT A/P: Moderate to severe stool distention of the distal sigmoid colon and rectum with slight surrounding stranding. Correlate for constipation/stercoral proctitis.  -C/w Ceftriaxone and Flagyl   -C/w Bowel regimen Senna and Miralax  -ID Dr. Denton following

## 2025-04-10 NOTE — PROGRESS NOTE ADULT - SUBJECTIVE AND OBJECTIVE BOX
Patient is seen and examined at the bed side, is afebrile. She is tolerating Remdesivir well.      REVIEW OF SYSTEMS: Unable to obtain due to mental status       ALLERGIES : Shrimp (Unknown)  losartan (Angioedema)  Chicken (Stomach Upset)      Vital Signs Last 24 Hrs  T(C): 35.8 (10 Apr 2025 00:58), Max: 36.4 (09 Apr 2025 12:29)  T(F): 96.5 (10 Apr 2025 00:58), Max: 97.5 (09 Apr 2025 12:29)  HR: 62 (10 Apr 2025 00:58) (62 - 92)  BP: 121/69 (10 Apr 2025 00:58) (120/88 - 138/84)  BP(mean): 86 (10 Apr 2025 00:58) (86 - 86)  RR: 18 (10 Apr 2025 00:58) (18 - 18)  SpO2: 98% (10 Apr 2025 00:58) (98% - 99%)    Parameters below as of 10 Apr 2025 00:58  Patient On (Oxygen Delivery Method): room air        PHYSICAL EXAM:  GENERAL: Not in distress   CHEST/LUNG:  Air entry bilaterally  HEART: s1 and s2 present  ABDOMEN:   Nondistended  EXTREMITIES: No pedal  edema  CNS: Awake       LABS:                        13.7   6.67  )-----------( 206      ( 10 Apr 2025 07:33 )             40.0                           13.8   10.17 )-----------( 199      ( 09 Apr 2025 06:35 )             40.1         04-09    134[L]  |  97  |  21[H]  ----------------------------<  164[H]  3.8   |  28  |  0.78    Ca    8.0[L]      09 Apr 2025 06:35  Phos  2.5     04-09  Mg     2.0     04-09    TPro  5.9[L]  /  Alb  2.7[L]  /  TBili  0.6  /  DBili  x   /  AST  16  /  ALT  29  /  AlkPhos  146[H]  04-08    04-08    134[L]  |  97  |  23[H]  ----------------------------<  186[H]  3.8   |  30  |  1.02    Ca    8.3[L]      08 Apr 2025 18:59  Phos  1.9     04-08  Mg     1.6     04-08    TPro  5.9[L]  /  Alb  2.7[L]  /  TBili  0.6  /  DBili  x   /  AST  16  /  ALT  29  /  AlkPhos  146[H]  04-08        CAPILLARY BLOOD GLUCOSE  POCT Blood Glucose.: 180 mg/dL (08 Apr 2025 15:53)    ABG - ( 08 Apr 2025 22:05 )  pH, Arterial: 7.48  pH, Blood: x     /  pCO2: 39    /  pO2: 97    / HCO3: 29    / Base Excess: 5.2   /  SaO2: 98            MEDICATIONS  (STANDING):    apixaban 5 milliGRAM(s) Oral every 12 hours  atorvastatin 10 milliGRAM(s) Oral at bedtime  carvedilol 6.25 milliGRAM(s) Oral every 12 hours  cefTRIAXone   IVPB 1000 milliGRAM(s) IV Intermittent every 24 hours  chlorhexidine 2% Cloths 1 Application(s) Topical <User Schedule>  famotidine    Tablet 20 milliGRAM(s) Oral daily  fluticasone propionate/ salmeterol 100-50 MICROgram(s) Diskus 1 Dose(s) Inhalation two times a day  insulin glargine Injectable (LANTUS) 7 Unit(s) SubCutaneous at bedtime  insulin lispro (ADMELOG) corrective regimen sliding scale   SubCutaneous three times a day before meals  insulin lispro (ADMELOG) corrective regimen sliding scale   SubCutaneous at bedtime  metroNIDAZOLE  IVPB 500 milliGRAM(s) IV Intermittent every 12 hours  montelukast 10 milliGRAM(s) Oral daily  mupirocin 2% Ointment 1 Application(s) Both Nostrils two times a day  polyethylene glycol 3350 17 Gram(s) Oral two times a day  remdesivir  IVPB   IV Intermittent   remdesivir  IVPB 100 milliGRAM(s) IV Intermittent every 24 hours  senna 2 Tablet(s) Oral at bedtime  sodium chloride 0.9%. 1000 milliLiter(s) (70 mL/Hr) IV Continuous <Continuous>  tiotropium 2.5 MICROgram(s) Inhaler 2 Puff(s) Inhalation daily        RADIOLOGY & ADDITIONAL TESTS:    4/8/25: CT Abdomen and Pelvis No Cont (04.08.25 @ 21:09) Motion limited noncontrast study. Etiology of given clinical symptoms is not clearly elucidated.    Motion limited evaluation of the pancreas. No clear evidence of acute peripancreatic inflammation. Correlate with lipase and clinical exam if   there is concern for pancreatitis.    Moderate to severe stool distention of the distal sigmoid colon and rectum with slight surrounding stranding. Correlate for constipation/stercoral proctitis.    Drop of air within the endometrial canal of unclear significance.    Left arm contrast extravasation. Marked soft tissue edema of the partially imaged left upper extremity on the first set of images.   Recommend clinical correlation to exclude compartment syndrome.    4/4/25: Xray Chest 1 View- PORTABLE-Urgent (Xray Chest 1 View- PORTABLE-Urgent .) (04.04.25 @ 18:35) >  HEART:difficult to access in this projection.  LUNGS: free of consolidation, effusion, or pneumothorax..  BONES: degenerative changes      MICROBIOLOGY DATA:    MRSA/MSSA PCR (04.09.25 @ 07:00)   MRSA PCR Result.: Detected:  Culture - Urine (04.04.25 @ 20:00)   Specimen Source: Clean Catch Clean Catch (Midstream)  Culture Results:   >=3 organisms. Probable collection contamination.    FluA/FluB/RSV/COVID PCR (04.04.25 @ 18:29)   SARS-CoV-2 Result: Detected:         Patient is seen and examined at the bed side, is afebrile. She is tolerating Remdesivir well, doing better.      REVIEW OF SYSTEMS: Unable to obtain due to mental status       ALLERGIES : Shrimp (Unknown)  losartan (Angioedema)  Chicken (Stomach Upset)      Vital Signs Last 24 Hrs  T(C): 35.8 (10 Apr 2025 00:58), Max: 36.4 (09 Apr 2025 12:29)  T(F): 96.5 (10 Apr 2025 00:58), Max: 97.5 (09 Apr 2025 12:29)  HR: 62 (10 Apr 2025 00:58) (62 - 92)  BP: 121/69 (10 Apr 2025 00:58) (120/88 - 138/84)  BP(mean): 86 (10 Apr 2025 00:58) (86 - 86)  RR: 18 (10 Apr 2025 00:58) (18 - 18)  SpO2: 98% (10 Apr 2025 00:58) (98% - 99%)    Parameters below as of 10 Apr 2025 00:58  Patient On (Oxygen Delivery Method): room air        PHYSICAL EXAM:  GENERAL: Not in distress   CHEST/LUNG:  Air entry bilaterally  HEART: s1 and s2 present  ABDOMEN:   Nondistended  EXTREMITIES: No pedal  edema  CNS: Awake and alert now      LABS:                        13.7   6.67  )-----------( 206      ( 10 Apr 2025 07:33 )             40.0                           13.8   10.17 )-----------( 199      ( 09 Apr 2025 06:35 )             40.1         04-09    134[L]  |  97  |  21[H]  ----------------------------<  164[H]  3.8   |  28  |  0.78    Ca    8.0[L]      09 Apr 2025 06:35  Phos  2.5     04-09  Mg     2.0     04-09    TPro  5.9[L]  /  Alb  2.7[L]  /  TBili  0.6  /  DBili  x   /  AST  16  /  ALT  29  /  AlkPhos  146[H]  04-08    04-08    134[L]  |  97  |  23[H]  ----------------------------<  186[H]  3.8   |  30  |  1.02    Ca    8.3[L]      08 Apr 2025 18:59  Phos  1.9     04-08  Mg     1.6     04-08    TPro  5.9[L]  /  Alb  2.7[L]  /  TBili  0.6  /  DBili  x   /  AST  16  /  ALT  29  /  AlkPhos  146[H]  04-08        CAPILLARY BLOOD GLUCOSE  POCT Blood Glucose.: 180 mg/dL (08 Apr 2025 15:53)    ABG - ( 08 Apr 2025 22:05 )  pH, Arterial: 7.48  pH, Blood: x     /  pCO2: 39    /  pO2: 97    / HCO3: 29    / Base Excess: 5.2   /  SaO2: 98            MEDICATIONS  (STANDING):    apixaban 5 milliGRAM(s) Oral every 12 hours  atorvastatin 10 milliGRAM(s) Oral at bedtime  carvedilol 6.25 milliGRAM(s) Oral every 12 hours  cefTRIAXone   IVPB 1000 milliGRAM(s) IV Intermittent every 24 hours  chlorhexidine 2% Cloths 1 Application(s) Topical <User Schedule>  famotidine    Tablet 20 milliGRAM(s) Oral daily  fluticasone propionate/ salmeterol 100-50 MICROgram(s) Diskus 1 Dose(s) Inhalation two times a day  insulin glargine Injectable (LANTUS) 7 Unit(s) SubCutaneous at bedtime  insulin lispro (ADMELOG) corrective regimen sliding scale   SubCutaneous three times a day before meals  insulin lispro (ADMELOG) corrective regimen sliding scale   SubCutaneous at bedtime  metroNIDAZOLE  IVPB 500 milliGRAM(s) IV Intermittent every 12 hours  montelukast 10 milliGRAM(s) Oral daily  mupirocin 2% Ointment 1 Application(s) Both Nostrils two times a day  polyethylene glycol 3350 17 Gram(s) Oral two times a day  remdesivir  IVPB   IV Intermittent   remdesivir  IVPB 100 milliGRAM(s) IV Intermittent every 24 hours  senna 2 Tablet(s) Oral at bedtime  sodium chloride 0.9%. 1000 milliLiter(s) (70 mL/Hr) IV Continuous <Continuous>  tiotropium 2.5 MICROgram(s) Inhaler 2 Puff(s) Inhalation daily        RADIOLOGY & ADDITIONAL TESTS:    4/8/25: CT Abdomen and Pelvis No Cont (04.08.25 @ 21:09) Motion limited noncontrast study. Etiology of given clinical symptoms is not clearly elucidated.    Motion limited evaluation of the pancreas. No clear evidence of acute peripancreatic inflammation. Correlate with lipase and clinical exam if   there is concern for pancreatitis.    Moderate to severe stool distention of the distal sigmoid colon and rectum with slight surrounding stranding. Correlate for constipation/stercoral proctitis.    Drop of air within the endometrial canal of unclear significance.    Left arm contrast extravasation. Marked soft tissue edema of the partially imaged left upper extremity on the first set of images.   Recommend clinical correlation to exclude compartment syndrome.    4/4/25: Xray Chest 1 View- PORTABLE-Urgent (Xray Chest 1 View- PORTABLE-Urgent .) (04.04.25 @ 18:35) >  HEART:difficult to access in this projection.  LUNGS: free of consolidation, effusion, or pneumothorax..  BONES: degenerative changes      MICROBIOLOGY DATA:    MRSA/MSSA PCR (04.09.25 @ 07:00)   MRSA PCR Result.: Detected:  Culture - Urine (04.04.25 @ 20:00)   Specimen Source: Clean Catch Clean Catch (Midstream)  Culture Results:   >=3 organisms. Probable collection contamination.    FluA/FluB/RSV/COVID PCR (04.04.25 @ 18:29)   SARS-CoV-2 Result: Detected:

## 2025-04-10 NOTE — PHARMACOTHERAPY INTERVENTION NOTE - COMMENTS
Remdesivir 100mg Q24H x 5 days ordered for COVID    Patient does not require supplemental oxygen    Recommend reduction of Remdesivir therapy to 3 days

## 2025-04-10 NOTE — PROGRESS NOTE ADULT - ASSESSMENT
Patient is a 89y old  Female with COPD, CHF, AFIB ( on eliquis), DM that comes in for abdominal pain. Patient a poor historian at bedside, unable to reach over the phone to daughter. Patient endoring that today was feeling unwell with weakness, dizziness after physical therapy. Per ED note patient was also noted to be sleepy. On admission, she found to have no fever but CT abd/pelvis shows " Moderate to severe stool distention of the distal sigmoid colon and rectum with slight surrounding stranding. Correlate for constipation/stercoral proctitis." She has started on Ceftriaxone and Flagyl and the ID consult requested to assist with further evaluation and antibiotic management.    # Constipation/stercoral proctitis  # Positive COVID PCR    would recommend:    1. Mupirocin ointment to apply on both nares X 5 days to decolonize  2. Aggressive bowel regimen to prevent further worsening of stercoral proctitis  3. Continue Ceftriaxone and Flagyl   4. Continue Remdesivir for positive COVID PCR  5. Aspiration and COVID precaution  6. Supplemental oxygenation and bronchodilator as needed     Attending Attestation:    Spent more than 45 minutes on total encounter, more than 50 % of the visit was spent counseling and/or coordinating care by the Attending physician.     Patient is a 89y old  Female with COPD, CHF, AFIB ( on eliquis), DM that comes in for abdominal pain. Patient a poor historian at bedside, unable to reach over the phone to daughter. Patient endoring that today was feeling unwell with weakness, dizziness after physical therapy. Per ED note patient was also noted to be sleepy. On admission, she found to have no fever but CT abd/pelvis shows " Moderate to severe stool distention of the distal sigmoid colon and rectum with slight surrounding stranding. Correlate for constipation/stercoral proctitis." She has started on Ceftriaxone and Flagyl and the ID consult requested to assist with further evaluation and antibiotic management.    # Constipation/stercoral proctitis  # Positive COVID PCR    would recommend:    1. Mupirocin ointment to apply on both nares X 5 days to decolonize  2. Aggressive bowel regimen to prevent further worsening of stercoral proctitis  3. Continue Ceftriaxone and Flagyl   4. Continue Remdesivir for positive COVID PCR  5. Aspiration and COVID precaution  6. Supplemental oxygenation and bronchodilator as needed     d/w covering Solange ROD    Attending Attestation:    Spent more than 45 minutes on total encounter, more than 50 % of the visit was spent counseling and/or coordinating care by the Attending physician.

## 2025-04-10 NOTE — PROGRESS NOTE ADULT - SUBJECTIVE AND OBJECTIVE BOX
Patient was seen and examined  Patient is a 89y old  Female who presents with a chief complaint of Colitis (09 Apr 2025 17:39)      INTERVAL HPI/OVERNIGHT EVENTS:  T(C): 35.8 (04-10-25 @ 00:58), Max: 36.4 (04-09-25 @ 12:29)  HR: 62 (04-10-25 @ 00:58) (62 - 92)  BP: 121/69 (04-10-25 @ 00:58) (120/88 - 138/84)  RR: 18 (04-10-25 @ 00:58) (18 - 18)  SpO2: 98% (04-10-25 @ 00:58) (98% - 99%)  Wt(kg): --  I&O's Summary      LABS:                        13.7   6.67  )-----------( 206      ( 10 Apr 2025 07:33 )             40.0     04-09    134[L]  |  97  |  21[H]  ----------------------------<  164[H]  3.8   |  28  |  0.78    Ca    8.0[L]      09 Apr 2025 06:35  Phos  2.5     04-09  Mg     2.0     04-09    TPro  5.9[L]  /  Alb  2.7[L]  /  TBili  0.6  /  DBili  x   /  AST  16  /  ALT  29  /  AlkPhos  146[H]  04-08      Urinalysis Basic - ( 09 Apr 2025 06:35 )    Color: x / Appearance: x / SG: x / pH: x  Gluc: 164 mg/dL / Ketone: x  / Bili: x / Urobili: x   Blood: x / Protein: x / Nitrite: x   Leuk Esterase: x / RBC: x / WBC x   Sq Epi: x / Non Sq Epi: x / Bacteria: x      CAPILLARY BLOOD GLUCOSE      POCT Blood Glucose.: 138 mg/dL (10 Apr 2025 07:27)  POCT Blood Glucose.: 143 mg/dL (09 Apr 2025 21:33)  POCT Blood Glucose.: 120 mg/dL (09 Apr 2025 16:49)  POCT Blood Glucose.: 172 mg/dL (09 Apr 2025 13:28)  POCT Blood Glucose.: 276 mg/dL (09 Apr 2025 12:04)        ABG - ( 08 Apr 2025 22:05 )  pH, Arterial: 7.48  pH, Blood: x     /  pCO2: 39    /  pO2: 97    / HCO3: 29    / Base Excess: 5.2   /  SaO2: 98                Urinalysis Basic - ( 09 Apr 2025 06:35 )    Color: x / Appearance: x / SG: x / pH: x  Gluc: 164 mg/dL / Ketone: x  / Bili: x / Urobili: x   Blood: x / Protein: x / Nitrite: x   Leuk Esterase: x / RBC: x / WBC x   Sq Epi: x / Non Sq Epi: x / Bacteria: x        MEDICATIONS  (STANDING):  apixaban 5 milliGRAM(s) Oral every 12 hours  atorvastatin 10 milliGRAM(s) Oral at bedtime  carvedilol 6.25 milliGRAM(s) Oral every 12 hours  cefTRIAXone   IVPB 1000 milliGRAM(s) IV Intermittent every 24 hours  chlorhexidine 2% Cloths 1 Application(s) Topical <User Schedule>  famotidine    Tablet 20 milliGRAM(s) Oral daily  fluticasone propionate/ salmeterol 100-50 MICROgram(s) Diskus 1 Dose(s) Inhalation two times a day  insulin glargine Injectable (LANTUS) 7 Unit(s) SubCutaneous at bedtime  insulin lispro (ADMELOG) corrective regimen sliding scale   SubCutaneous three times a day before meals  insulin lispro (ADMELOG) corrective regimen sliding scale   SubCutaneous at bedtime  metroNIDAZOLE  IVPB 500 milliGRAM(s) IV Intermittent every 12 hours  montelukast 10 milliGRAM(s) Oral daily  mupirocin 2% Ointment 1 Application(s) Both Nostrils two times a day  polyethylene glycol 3350 17 Gram(s) Oral two times a day  remdesivir  IVPB   IV Intermittent   remdesivir  IVPB 100 milliGRAM(s) IV Intermittent every 24 hours  senna 2 Tablet(s) Oral at bedtime  sodium chloride 0.9%. 1000 milliLiter(s) (70 mL/Hr) IV Continuous <Continuous>  tiotropium 2.5 MICROgram(s) Inhaler 2 Puff(s) Inhalation daily    MEDICATIONS  (PRN):  acetaminophen     Tablet .. 650 milliGRAM(s) Oral every 6 hours PRN Temp greater or equal to 38C (100.4F), Mild Pain (1 - 3)  albuterol/ipratropium for Nebulization 3 milliLiter(s) Nebulizer every 6 hours PRN Bronchospasm  ondansetron Injectable 4 milliGRAM(s) IV Push every 8 hours PRN Nausea and/or Vomiting      RADIOLOGY & ADDITIONAL TESTS:    Imaging Personally Reviewed:  [ ] YES  [ ] NO    REVIEW OF SYSTEMS:  CONSTITUTIONAL: No fever, weight loss, or fatigue  EYES: No eye pain, visual disturbances, or discharge  ENMT:  No difficulty hearing, tinnitus, vertigo; No sinus or throat pain  NECK: No pain or stiffness  BREASTS: No pain, masses, or nipple discharge  RESPIRATORY: No cough, wheezing, chills or hemoptysis; No shortness of breath  CARDIOVASCULAR: No chest pain, palpitations, dizziness, or leg swelling  GASTROINTESTINAL: No abdominal or epigastric pain. No nausea, vomiting, or hematemesis; No diarrhea or constipation. No melena or hematochezia.  GENITOURINARY: No dysuria, frequency, hematuria, or incontinence  NEUROLOGICAL: No headaches, memory loss, loss of strength, numbness, or tremors  SKIN: No itching, burning, rashes, or lesions   LYMPH NODES: No enlarged glands  ENDOCRINE: No heat or cold intolerance; No hair loss  MUSCULOSKELETAL: No joint pain or swelling; No muscle, back, or extremity pain  PSYCHIATRIC: No depression, anxiety, mood swings, or difficulty sleeping  HEME/LYMPH: No easy bruising, or bleeding gums  ALLERY AND IMMUNOLOGIC: No hives or eczema      Consultant(s) Notes Reviewed:  [ x ] YES  [ ] NO    PHYSICAL EXAM:  GENERAL: NAD, well-groomed, well-developed  HEAD:  Atraumatic, Normocephalic  EYES: blind   ENMT: No tonsillar erythema, exudates, or enlargement; Moist mucous membranes, Good dentition, No lesions  NECK: Supple, No JVD, Normal thyroid  NERVOUS SYSTEM: awake alert   CHEST/LUNG: bl rhonchi   HEART: Regular rate and rhythm; No murmurs, rubs, or gallops  ABDOMEN: Soft, Nontender, Nondistended; Bowel sounds present  EXTREMITIES:  2+ Peripheral Pulses, No clubbing, cyanosis, or edema  LYMPH: No lymphadenopathy noted  SKIN: No rashes or lesions    Care Discussed with Consultants/Other Providers [ x] YES  [ ] NO

## 2025-04-11 DIAGNOSIS — Z75.8 OTHER PROBLEMS RELATED TO MEDICAL FACILITIES AND OTHER HEALTH CARE: ICD-10-CM

## 2025-04-11 LAB
GLUCOSE BLDC GLUCOMTR-MCNC: 151 MG/DL — HIGH (ref 70–99)
GLUCOSE BLDC GLUCOMTR-MCNC: 156 MG/DL — HIGH (ref 70–99)
GLUCOSE BLDC GLUCOMTR-MCNC: 164 MG/DL — HIGH (ref 70–99)
GLUCOSE BLDC GLUCOMTR-MCNC: 212 MG/DL — HIGH (ref 70–99)

## 2025-04-11 PROCEDURE — 76937 US GUIDE VASCULAR ACCESS: CPT | Mod: 26

## 2025-04-11 PROCEDURE — 36410 VNPNXR 3YR/> PHY/QHP DX/THER: CPT

## 2025-04-11 PROCEDURE — 36000 PLACE NEEDLE IN VEIN: CPT

## 2025-04-11 RX ADMIN — APIXABAN 5 MILLIGRAM(S): 2.5 TABLET, FILM COATED ORAL at 06:05

## 2025-04-11 RX ADMIN — TIOTROPIUM BROMIDE INHALATION SPRAY 2 PUFF(S): 3.12 SPRAY, METERED RESPIRATORY (INHALATION) at 13:00

## 2025-04-11 RX ADMIN — REMDESIVIR 200 MILLIGRAM(S): 5 INJECTION INTRAVENOUS at 18:38

## 2025-04-11 RX ADMIN — Medication 100 MILLIGRAM(S): at 19:37

## 2025-04-11 RX ADMIN — INSULIN LISPRO 1: 100 INJECTION, SOLUTION INTRAVENOUS; SUBCUTANEOUS at 08:28

## 2025-04-11 RX ADMIN — MONTELUKAST SODIUM 10 MILLIGRAM(S): 10 TABLET ORAL at 12:32

## 2025-04-11 RX ADMIN — MUPIROCIN CALCIUM 1 APPLICATION(S): 20 CREAM TOPICAL at 18:31

## 2025-04-11 RX ADMIN — INSULIN LISPRO 1: 100 INJECTION, SOLUTION INTRAVENOUS; SUBCUTANEOUS at 17:04

## 2025-04-11 RX ADMIN — Medication 1 DOSE(S): at 21:36

## 2025-04-11 RX ADMIN — CARVEDILOL 6.25 MILLIGRAM(S): 3.12 TABLET, FILM COATED ORAL at 18:39

## 2025-04-11 RX ADMIN — Medication 1 DOSE(S): at 12:27

## 2025-04-11 RX ADMIN — Medication 2 TABLET(S): at 21:36

## 2025-04-11 RX ADMIN — Medication 1 APPLICATION(S): at 06:02

## 2025-04-11 RX ADMIN — Medication 20 MILLIGRAM(S): at 12:32

## 2025-04-11 RX ADMIN — ATORVASTATIN CALCIUM 10 MILLIGRAM(S): 80 TABLET, FILM COATED ORAL at 21:36

## 2025-04-11 RX ADMIN — CARVEDILOL 6.25 MILLIGRAM(S): 3.12 TABLET, FILM COATED ORAL at 06:04

## 2025-04-11 RX ADMIN — APIXABAN 5 MILLIGRAM(S): 2.5 TABLET, FILM COATED ORAL at 18:39

## 2025-04-11 RX ADMIN — INSULIN LISPRO 1: 100 INJECTION, SOLUTION INTRAVENOUS; SUBCUTANEOUS at 12:27

## 2025-04-11 RX ADMIN — INSULIN GLARGINE-YFGN 7 UNIT(S): 100 INJECTION, SOLUTION SUBCUTANEOUS at 21:35

## 2025-04-11 RX ADMIN — MUPIROCIN CALCIUM 1 APPLICATION(S): 20 CREAM TOPICAL at 06:03

## 2025-04-11 NOTE — PROGRESS NOTE ADULT - SUBJECTIVE AND OBJECTIVE BOX
NP Note discussed with  primary attending    Patient is a 89y old  Female who presents with a chief complaint of Colitis (11 Apr 2025 14:30)      INTERVAL HPI/OVERNIGHT EVENTS: no new complaints    MEDICATIONS  (STANDING):  apixaban 5 milliGRAM(s) Oral every 12 hours  atorvastatin 10 milliGRAM(s) Oral at bedtime  carvedilol 6.25 milliGRAM(s) Oral every 12 hours  cefTRIAXone   IVPB 1000 milliGRAM(s) IV Intermittent every 24 hours  chlorhexidine 2% Cloths 1 Application(s) Topical <User Schedule>  famotidine    Tablet 20 milliGRAM(s) Oral daily  fluticasone propionate/ salmeterol 100-50 MICROgram(s) Diskus 1 Dose(s) Inhalation two times a day  insulin glargine Injectable (LANTUS) 7 Unit(s) SubCutaneous at bedtime  insulin lispro (ADMELOG) corrective regimen sliding scale   SubCutaneous three times a day before meals  insulin lispro (ADMELOG) corrective regimen sliding scale   SubCutaneous at bedtime  metroNIDAZOLE  IVPB 500 milliGRAM(s) IV Intermittent every 12 hours  montelukast 10 milliGRAM(s) Oral daily  mupirocin 2% Ointment 1 Application(s) Both Nostrils two times a day  polyethylene glycol 3350 17 Gram(s) Oral two times a day  remdesivir  IVPB 100 milliGRAM(s) IV Intermittent every 24 hours  senna 2 Tablet(s) Oral at bedtime  sodium chloride 0.9%. 1000 milliLiter(s) (70 mL/Hr) IV Continuous <Continuous>  tiotropium 2.5 MICROgram(s) Inhaler 2 Puff(s) Inhalation daily    MEDICATIONS  (PRN):  acetaminophen     Tablet .. 650 milliGRAM(s) Oral every 6 hours PRN Temp greater or equal to 38C (100.4F), Mild Pain (1 - 3)  albuterol/ipratropium for Nebulization 3 milliLiter(s) Nebulizer every 6 hours PRN Bronchospasm  ondansetron Injectable 4 milliGRAM(s) IV Push every 8 hours PRN Nausea and/or Vomiting      __________________________________________________  REVIEW OF SYSTEMS:    CONSTITUTIONAL: No fever,   EYES: no acute visual disturbances  NECK: No pain or stiffness  RESPIRATORY: No cough; No shortness of breath  CARDIOVASCULAR: No chest pain, no palpitations  GASTROINTESTINAL: No pain. No nausea or vomiting; No diarrhea   NEUROLOGICAL: No headache or numbness, no tremors  MUSCULOSKELETAL: No joint pain, no muscle pain  GENITOURINARY: no dysuria, no frequency, no hesitancy  PSYCHIATRY: no depression , no anxiety  ALL OTHER  ROS negative        Vital Signs Last 24 Hrs  T(C): 36.4 (11 Apr 2025 12:01), Max: 36.9 (10 Apr 2025 22:04)  T(F): 97.5 (11 Apr 2025 12:01), Max: 98.5 (10 Apr 2025 22:04)  HR: 68 (11 Apr 2025 12:01) (64 - 73)  BP: 124/82 (11 Apr 2025 12:01) (112/68 - 124/82)  BP(mean): --  RR: 19 (11 Apr 2025 12:01) (18 - 20)  SpO2: 97% (11 Apr 2025 12:01) (97% - 100%)    Parameters below as of 11 Apr 2025 12:01  Patient On (Oxygen Delivery Method): room air        ________________________________________________  PHYSICAL EXAM:  GENERAL: NAD  HEENT: Normocephalic;  conjunctivae and sclerae clear; moist mucous membranes;   NECK : supple  CHEST/LUNG: Clear to ausculitation bilaterally with good air entry   HEART: S1 S2  regular; no murmurs, gallops or rubs  ABDOMEN: Soft, Nontender, Nondistended; Bowel sounds present  EXTREMITIES: no cyanosis; no edema; no calf tenderness  SKIN: warm and dry; no rash  NERVOUS SYSTEM:  Awake and alert; Oriented  to place, person and time ; no new deficits    _________________________________________________  LABS:                        13.7   6.67  )-----------( 206      ( 10 Apr 2025 07:33 )             40.0     04-10    137  |  99  |  15  ----------------------------<  129[H]  3.1[L]   |  29  |  0.67    Ca    7.9[L]      10 Apr 2025 07:33        Urinalysis Basic - ( 10 Apr 2025 07:33 )    Color: x / Appearance: x / SG: x / pH: x  Gluc: 129 mg/dL / Ketone: x  / Bili: x / Urobili: x   Blood: x / Protein: x / Nitrite: x   Leuk Esterase: x / RBC: x / WBC x   Sq Epi: x / Non Sq Epi: x / Bacteria: x      CAPILLARY BLOOD GLUCOSE      POCT Blood Glucose.: 156 mg/dL (11 Apr 2025 11:49)  POCT Blood Glucose.: 151 mg/dL (11 Apr 2025 08:11)  POCT Blood Glucose.: 147 mg/dL (10 Apr 2025 21:54)  POCT Blood Glucose.: 149 mg/dL (10 Apr 2025 17:03)        RADIOLOGY & ADDITIONAL TESTS:  < from: CT Abdomen and Pelvis No Cont (04.08.25 @ 21:09) >  IMPRESSION:    Motion limited noncontrast study. Etiology of given clinical symptoms is   not clearly elucidated.    Motion limited evaluation of the pancreas. No clear evidence of acute   peripancreatic inflammation. Correlate with lipase and clinical exam if   there is concern for pancreatitis.    Moderate to severe stool distention of the distal sigmoid colon and   rectum with slight surrounding stranding. Correlate for   constipation/stercoral proctitis.    Drop of air within the endometrial canal of unclear significance.    Left arm contrast extravasation. Marked soft tissue edema of the   partially imaged left upper extremity on the first set of images.   Recommend clinical correlation to exclude compartment syndrome.      < end of copied text >    Imaging Personally Reviewed:  YES    Consultant(s) Notes Reviewed:   YES    Care Discussed with Consultants :     Plan of care was discussed with patient and /or primary care giver; all questions and concerns were addressed and care was aligned with patient's wishes.

## 2025-04-11 NOTE — PROGRESS NOTE ADULT - ASSESSMENT
Patient is a 89y old  Female with COPD, CHF, AFIB ( on eliquis), DM that comes in for abdominal pain. Patient a poor historian at bedside, unable to reach over the phone to daughter. Patient endoring that today was feeling unwell with weakness, dizziness after physical therapy. Per ED note patient was also noted to be sleepy. On admission, she found to have no fever but CT abd/pelvis shows " Moderate to severe stool distention of the distal sigmoid colon and rectum with slight surrounding stranding. Correlate for constipation/stercoral proctitis." She has started on Ceftriaxone and Flagyl and the ID consult requested to assist with further evaluation and antibiotic management.    # Constipation/stercoral proctitis  # Positive COVID PCR    would recommend:    1. Mupirocin ointment to apply on both nares X 5 days to decolonize  2. Aggressive bowel regimen to prevent further worsening of stercoral proctitis  3. Continue Ceftriaxone and Flagyl   4. Continue Remdesivir for positive COVID PCR  5. Aspiration and COVID precaution  6. Supplemental oxygenation and bronchodilator as needed     d/w covering Solange ROD    Attending Attestation:    Spent more than 45 minutes on total encounter, more than 50 % of the visit was spent counseling and/or coordinating care by the Attending physician.   Patient is a 89y old  Female with COPD, CHF, AFIB ( on eliquis), DM that comes in for abdominal pain. Patient a poor historian at bedside, unable to reach over the phone to daughter. Patient endoring that today was feeling unwell with weakness, dizziness after physical therapy. Per ED note patient was also noted to be sleepy. On admission, she found to have no fever but CT abd/pelvis shows " Moderate to severe stool distention of the distal sigmoid colon and rectum with slight surrounding stranding. Correlate for constipation/stercoral proctitis." She has started on Ceftriaxone and Flagyl and the ID consult requested to assist with further evaluation and antibiotic management.    # Constipation/stercoral proctitis  # Positive COVID PCR    would recommend:    1. Aggressive bowel regimen to prevent further worsening of stercoral proctitis  2. Continue Remdesivir to complete the course  3. Continue Ceftriaxone and Flagyl and may change to oral Augmentin 875mg q 12hours on discharge to continue until 4/15/25  4. Aspiration and COVID precaution  5. OOB to chair     d/w covering Marky ROD    Attending Attestation:    Spent more than 35 minutes on total encounter, more than 50 % of the visit was spent counseling and/or coordinating care by the Attending physician.

## 2025-04-11 NOTE — DIETITIAN INITIAL EVALUATION ADULT - PERTINENT LABORATORY DATA
04-10    137  |  99  |  15  ----------------------------<  129[H]  3.1[L]   |  29  |  0.67    Ca    7.9[L]      10 Apr 2025 07:33    POCT Blood Glucose.: 156 mg/dL (04-11-25 @ 11:49)  A1C with Estimated Average Glucose Result: 10.6 % (03-22-25 @ 05:45)

## 2025-04-11 NOTE — PROGRESS NOTE ADULT - ASSESSMENT
89 yo F COPD, CHF, AFIB ( on eliquis), DM that comes in for abdominal paincolitis,COVID+.  1.Colitis-abx.  2.COVID+-supportive care.On remdesmivir as per ID.  3.Afib-eliquis,coreg.  4.DM-Insulin.  5.COPD-MDI.  6.PPI.  7.Replace k+.

## 2025-04-11 NOTE — PROGRESS NOTE ADULT - SUBJECTIVE AND OBJECTIVE BOX
Patient was seen and examined  Patient is a 89y old  Female who presents with a chief complaint of Colitis (10 Apr 2025 16:28)      INTERVAL HPI/OVERNIGHT EVENTS:  T(C): 36.6 (04-11-25 @ 05:12), Max: 36.9 (04-10-25 @ 22:04)  HR: 73 (04-11-25 @ 05:12) (64 - 79)  BP: 122/87 (04-11-25 @ 05:12) (110/71 - 122/87)  RR: 20 (04-11-25 @ 05:12) (18 - 20)  SpO2: 100% (04-11-25 @ 05:12) (97% - 100%)  Wt(kg): --  I&O's Summary      LABS:                        13.7   6.67  )-----------( 206      ( 10 Apr 2025 07:33 )             40.0     04-10    137  |  99  |  15  ----------------------------<  129[H]  3.1[L]   |  29  |  0.67    Ca    7.9[L]      10 Apr 2025 07:33        Urinalysis Basic - ( 10 Apr 2025 07:33 )    Color: x / Appearance: x / SG: x / pH: x  Gluc: 129 mg/dL / Ketone: x  / Bili: x / Urobili: x   Blood: x / Protein: x / Nitrite: x   Leuk Esterase: x / RBC: x / WBC x   Sq Epi: x / Non Sq Epi: x / Bacteria: x      CAPILLARY BLOOD GLUCOSE      POCT Blood Glucose.: 147 mg/dL (10 Apr 2025 21:54)  POCT Blood Glucose.: 149 mg/dL (10 Apr 2025 17:03)  POCT Blood Glucose.: 236 mg/dL (10 Apr 2025 11:43)  POCT Blood Glucose.: 138 mg/dL (10 Apr 2025 07:27)          Urinalysis Basic - ( 10 Apr 2025 07:33 )    Color: x / Appearance: x / SG: x / pH: x  Gluc: 129 mg/dL / Ketone: x  / Bili: x / Urobili: x   Blood: x / Protein: x / Nitrite: x   Leuk Esterase: x / RBC: x / WBC x   Sq Epi: x / Non Sq Epi: x / Bacteria: x        MEDICATIONS  (STANDING):  apixaban 5 milliGRAM(s) Oral every 12 hours  atorvastatin 10 milliGRAM(s) Oral at bedtime  carvedilol 6.25 milliGRAM(s) Oral every 12 hours  cefTRIAXone   IVPB 1000 milliGRAM(s) IV Intermittent every 24 hours  chlorhexidine 2% Cloths 1 Application(s) Topical <User Schedule>  famotidine    Tablet 20 milliGRAM(s) Oral daily  fluticasone propionate/ salmeterol 100-50 MICROgram(s) Diskus 1 Dose(s) Inhalation two times a day  insulin glargine Injectable (LANTUS) 7 Unit(s) SubCutaneous at bedtime  insulin lispro (ADMELOG) corrective regimen sliding scale   SubCutaneous three times a day before meals  insulin lispro (ADMELOG) corrective regimen sliding scale   SubCutaneous at bedtime  metroNIDAZOLE  IVPB 500 milliGRAM(s) IV Intermittent every 12 hours  montelukast 10 milliGRAM(s) Oral daily  mupirocin 2% Ointment 1 Application(s) Both Nostrils two times a day  polyethylene glycol 3350 17 Gram(s) Oral two times a day  remdesivir  IVPB 100 milliGRAM(s) IV Intermittent every 24 hours  senna 2 Tablet(s) Oral at bedtime  sodium chloride 0.9%. 1000 milliLiter(s) (70 mL/Hr) IV Continuous <Continuous>  tiotropium 2.5 MICROgram(s) Inhaler 2 Puff(s) Inhalation daily    MEDICATIONS  (PRN):  acetaminophen     Tablet .. 650 milliGRAM(s) Oral every 6 hours PRN Temp greater or equal to 38C (100.4F), Mild Pain (1 - 3)  albuterol/ipratropium for Nebulization 3 milliLiter(s) Nebulizer every 6 hours PRN Bronchospasm  ondansetron Injectable 4 milliGRAM(s) IV Push every 8 hours PRN Nausea and/or Vomiting      RADIOLOGY & ADDITIONAL TESTS:    Imaging Personally Reviewed:  [ ] YES  [ ] NO    REVIEW OF SYSTEMS:  CONSTITUTIONAL: No fever, weight loss, or fatigue  EYES: No eye pain, visual disturbances, or discharge  ENMT:  No difficulty hearing, tinnitus, vertigo; No sinus or throat pain  NECK: No pain or stiffness  BREASTS: No pain, masses, or nipple discharge  RESPIRATORY: No cough, wheezing, chills or hemoptysis; No shortness of breath  CARDIOVASCULAR: No chest pain, palpitations, dizziness, or leg swelling  GASTROINTESTINAL: No abdominal or epigastric pain. No nausea, vomiting, or hematemesis; No diarrhea or constipation. No melena or hematochezia.  GENITOURINARY: No dysuria, frequency, hematuria, or incontinence  NEUROLOGICAL: No headaches, memory loss, loss of strength, numbness, or tremors  SKIN: No itching, burning, rashes, or lesions   LYMPH NODES: No enlarged glands  ENDOCRINE: No heat or cold intolerance; No hair loss  MUSCULOSKELETAL: No joint pain or swelling; No muscle, back, or extremity pain  PSYCHIATRIC: No depression, anxiety, mood swings, or difficulty sleeping  HEME/LYMPH: No easy bruising, or bleeding gums  ALLERY AND IMMUNOLOGIC: No hives or eczema      Consultant(s) Notes Reviewed:  [ ] YES  [ ] NO    PHYSICAL EXAM:  GENERAL: NAD, well-groomed, well-developed  HEAD:  Atraumatic, Normocephalic  EYES: EOMI, PERRLA, conjunctiva and sclera clear  ENMT: No tonsillar erythema, exudates, or enlargement; Moist mucous membranes, Good dentition, No lesions  NECK: Supple, No JVD, Normal thyroid  NERVOUS SYSTEM:  Alert & Oriented X3, Good concentration; Motor Strength 5/5 B/L upper and lower extremities; DTRs 2+ intact and symmetric  CHEST/LUNG: Clear to percussion bilaterally; No rales, rhonchi, wheezing, or rubs  HEART: Regular rate and rhythm; No murmurs, rubs, or gallops  ABDOMEN: Soft, Nontender, Nondistended; Bowel sounds present  EXTREMITIES:  2+ Peripheral Pulses, No clubbing, cyanosis, or edema  LYMPH: No lymphadenopathy noted  SKIN: No rashes or lesions    Care Discussed with Consultants/Other Providers [ x] YES  [ ] NO

## 2025-04-11 NOTE — DIETITIAN INITIAL EVALUATION ADULT - WEIGHT (LBS)
Pt on call light. Pt requesting if there is a male doctor here tonight.  This writer will inform staff   145

## 2025-04-11 NOTE — DIETITIAN INITIAL EVALUATION ADULT - OTHER INFO
Pt Visited . Asleep.  Attempted TO CALL DAUGHTER Unable to provide  more info since she is visiting told to call G daughter D/W Grand Daughter ( Mirela) @ [phone]. Per G Daughter Pt with Good appetite. Daughter brought Home food and fed and Pt ate Good.  Pt was DX : recently w DM. G Daughter Provided with Diet education  and Tips on No concentrated sweets. Pt with advanced age of 89. 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 Food choices Updated. F/N Dept Notified. Labs noted A1c 10.6. HT 69 inches, USUAL BODY WEIGHT 205-210 LB. Pt is on Metformin at home. Pt recently admitted to [facility] in March 2025

## 2025-04-11 NOTE — PROGRESS NOTE ADULT - SUBJECTIVE AND OBJECTIVE BOX
Date of Service 04-11-25 @ 14:30    CHIEF COMPLAINT:Patient is a 89y old  Female who presents with a chief complaint of Colitis .Pt appears comfortable.    	  REVIEW OF SYSTEMS:  CONSTITUTIONAL: No fever, weight loss, or fatigue  EYES: No eye pain, visual disturbances, or discharge  ENT:  No difficulty hearing, tinnitus, vertigo; No sinus or throat pain  NECK: No pain or stiffness  RESPIRATORY: No cough, wheezing, chills or hemoptysis; No Shortness of Breath  CARDIOVASCULAR: No chest pain, palpitations, passing out, dizziness, or leg swelling  GASTROINTESTINAL: No abdominal or epigastric pain. No nausea, vomiting, or hematemesis; No diarrhea or constipation. No melena or hematochezia.  GENITOURINARY: No dysuria, frequency, hematuria, or incontinence  NEUROLOGICAL: No headaches, memory loss, loss of strength, numbness, or tremors  SKIN: No itching, burning, rashes, or lesions   LYMPH Nodes: No enlarged glands  ENDOCRINE: No heat or cold intolerance; No hair loss  MUSCULOSKELETAL: No joint pain or swelling; No muscle, back, or extremity pain  PSYCHIATRIC: No depression, anxiety, mood swings, or difficulty sleeping  HEME/LYMPH: No easy bruising, or bleeding gums  ALLERGY AND IMMUNOLOGIC: No hives or eczema	      PHYSICAL EXAM:  T(C): 36.4 (04-11-25 @ 12:01), Max: 36.9 (04-10-25 @ 22:04)  HR: 68 (04-11-25 @ 12:01) (64 - 73)  BP: 124/82 (04-11-25 @ 12:01) (112/68 - 124/82)  RR: 19 (04-11-25 @ 12:01) (18 - 20)  SpO2: 97% (04-11-25 @ 12:01) (97% - 100%)  Wt(kg): --  I&O's Summary      Appearance: Normal	  HEENT:   Normal oral mucosa, PERRL, EOMI	  Lymphatic: No lymphadenopathy  Cardiovascular: Normal S1 S2, No JVD, No murmurs, No edema  Respiratory: Lungs clear to auscultation	  Psychiatry: A & O x 3, Mood & affect appropriate  Gastrointestinal:  Soft, Non-tender, + BS	  Skin: No rashes, No ecchymoses, No cyanosis	  Neurologic: Non-focal  Extremities: Normal range of motion, No clubbing, cyanosis or edema  Vascular: Peripheral pulses palpable 2+ bilaterally    MEDICATIONS  (STANDING):  apixaban 5 milliGRAM(s) Oral every 12 hours  atorvastatin 10 milliGRAM(s) Oral at bedtime  carvedilol 6.25 milliGRAM(s) Oral every 12 hours  cefTRIAXone   IVPB 1000 milliGRAM(s) IV Intermittent every 24 hours  chlorhexidine 2% Cloths 1 Application(s) Topical <User Schedule>  famotidine    Tablet 20 milliGRAM(s) Oral daily  fluticasone propionate/ salmeterol 100-50 MICROgram(s) Diskus 1 Dose(s) Inhalation two times a day  insulin glargine Injectable (LANTUS) 7 Unit(s) SubCutaneous at bedtime  insulin lispro (ADMELOG) corrective regimen sliding scale   SubCutaneous three times a day before meals  insulin lispro (ADMELOG) corrective regimen sliding scale   SubCutaneous at bedtime  metroNIDAZOLE  IVPB 500 milliGRAM(s) IV Intermittent every 12 hours  montelukast 10 milliGRAM(s) Oral daily  mupirocin 2% Ointment 1 Application(s) Both Nostrils two times a day  polyethylene glycol 3350 17 Gram(s) Oral two times a day  remdesivir  IVPB 100 milliGRAM(s) IV Intermittent every 24 hours  senna 2 Tablet(s) Oral at bedtime  sodium chloride 0.9%. 1000 milliLiter(s) (70 mL/Hr) IV Continuous <Continuous>  tiotropium 2.5 MICROgram(s) Inhaler 2 Puff(s) Inhalation daily      	  	  LABS:	 	                       13.7   6.67  )-----------( 206      ( 10 Apr 2025 07:33 )             40.0     04-10    137  |  99  |  15  ----------------------------<  129[H]  3.1[L]   |  29  |  0.67    Ca    7.9[L]      10 Apr 2025 07:33      TSH: Thyroid Stimulating Hormone, Serum: 1.92 uU/mL (03-24 @ 06:16)

## 2025-04-11 NOTE — PROGRESS NOTE ADULT - ASSESSMENT
Patient is a 89 yo F COPD, CHF, AFIB ( on eliquis), DM that comes in for abdominal pain. CT concerning for constipation/stercoral proctitis. started on ceftriaxone and flagyl. Found to have covid + on PCR. Started on remdesevir  per ID recs.

## 2025-04-11 NOTE — PROGRESS NOTE ADULT - SUBJECTIVE AND OBJECTIVE BOX
Patient is seen and examined at the bed side, is afebrile. She is tolerating Remdesivir well, doing better.      REVIEW OF SYSTEMS: Unable to obtain due to mental status       ALLERGIES : Shrimp (Unknown)  losartan (Angioedema)  Chicken (Stomach Upset)      Vital Signs Last 24 Hrs  T(C): 36.4 (11 Apr 2025 12:01), Max: 36.9 (10 Apr 2025 22:04)  T(F): 97.5 (11 Apr 2025 12:01), Max: 98.5 (10 Apr 2025 22:04)  HR: 68 (11 Apr 2025 12:01) (64 - 73)  BP: 124/82 (11 Apr 2025 12:01) (112/68 - 124/82)  BP(mean): --  RR: 19 (11 Apr 2025 12:01) (18 - 20)  SpO2: 97% (11 Apr 2025 12:01) (97% - 100%)    Parameters below as of 11 Apr 2025 12:01  Patient On (Oxygen Delivery Method): room air        PHYSICAL EXAM:  GENERAL: Not in distress   CHEST/LUNG:  Air entry bilaterally  HEART: s1 and s2 present  ABDOMEN:   Nondistended  EXTREMITIES: No pedal  edema  CNS: Awake and alert now      LABS: No new Labs                         13.7   6.67  )-----------( 206      ( 10 Apr 2025 07:33 )             40.0                                13.8   10.17 )-----------( 199      ( 09 Apr 2025 06:35 )             40.1         04-09    134[L]  |  97  |  21[H]  ----------------------------<  164[H]  3.8   |  28  |  0.78    Ca    8.0[L]      09 Apr 2025 06:35  Phos  2.5     04-09  Mg     2.0     04-09    TPro  5.9[L]  /  Alb  2.7[L]  /  TBili  0.6  /  DBili  x   /  AST  16  /  ALT  29  /  AlkPhos  146[H]  04-08    04-08    134[L]  |  97  |  23[H]  ----------------------------<  186[H]  3.8   |  30  |  1.02    Ca    8.3[L]      08 Apr 2025 18:59  Phos  1.9     04-08  Mg     1.6     04-08    TPro  5.9[L]  /  Alb  2.7[L]  /  TBili  0.6  /  DBili  x   /  AST  16  /  ALT  29  /  AlkPhos  146[H]  04-08        CAPILLARY BLOOD GLUCOSE  POCT Blood Glucose.: 180 mg/dL (08 Apr 2025 15:53)    ABG - ( 08 Apr 2025 22:05 )  pH, Arterial: 7.48  pH, Blood: x     /  pCO2: 39    /  pO2: 97    / HCO3: 29    / Base Excess: 5.2   /  SaO2: 98            MEDICATIONS  (STANDING):    apixaban 5 milliGRAM(s) Oral every 12 hours  atorvastatin 10 milliGRAM(s) Oral at bedtime  carvedilol 6.25 milliGRAM(s) Oral every 12 hours  cefTRIAXone   IVPB 1000 milliGRAM(s) IV Intermittent every 24 hours  chlorhexidine 2% Cloths 1 Application(s) Topical <User Schedule>  famotidine    Tablet 20 milliGRAM(s) Oral daily  fluticasone propionate/ salmeterol 100-50 MICROgram(s) Diskus 1 Dose(s) Inhalation two times a day  insulin glargine Injectable (LANTUS) 7 Unit(s) SubCutaneous at bedtime  insulin lispro (ADMELOG) corrective regimen sliding scale   SubCutaneous three times a day before meals  insulin lispro (ADMELOG) corrective regimen sliding scale   SubCutaneous at bedtime  metroNIDAZOLE  IVPB 500 milliGRAM(s) IV Intermittent every 12 hours  montelukast 10 milliGRAM(s) Oral daily  mupirocin 2% Ointment 1 Application(s) Both Nostrils two times a day  polyethylene glycol 3350 17 Gram(s) Oral two times a day  remdesivir  IVPB 100 milliGRAM(s) IV Intermittent every 24 hours  senna 2 Tablet(s) Oral at bedtime  sodium chloride 0.9%. 1000 milliLiter(s) (70 mL/Hr) IV Continuous <Continuous>  tiotropium 2.5 MICROgram(s) Inhaler 2 Puff(s) Inhalation daily        RADIOLOGY & ADDITIONAL TESTS:    4/8/25: CT Abdomen and Pelvis No Cont (04.08.25 @ 21:09) Motion limited noncontrast study. Etiology of given clinical symptoms is not clearly elucidated.    Motion limited evaluation of the pancreas. No clear evidence of acute peripancreatic inflammation. Correlate with lipase and clinical exam if   there is concern for pancreatitis.    Moderate to severe stool distention of the distal sigmoid colon and rectum with slight surrounding stranding. Correlate for constipation/stercoral proctitis.    Drop of air within the endometrial canal of unclear significance.    Left arm contrast extravasation. Marked soft tissue edema of the partially imaged left upper extremity on the first set of images.   Recommend clinical correlation to exclude compartment syndrome.    4/4/25: Xray Chest 1 View- PORTABLE-Urgent (Xray Chest 1 View- PORTABLE-Urgent .) (04.04.25 @ 18:35) >  HEART:difficult to access in this projection.  LUNGS: free of consolidation, effusion, or pneumothorax..  BONES: degenerative changes      MICROBIOLOGY DATA:    MRSA/MSSA PCR (04.09.25 @ 07:00)   MRSA PCR Result.: Detected:      Culture - Urine (04.04.25 @ 20:00)   Specimen Source: Clean Catch Clean Catch (Midstream)  Culture Results:   >=3 organisms. Probable collection contamination.    FluA/FluB/RSV/COVID PCR (04.04.25 @ 18:29)   SARS-CoV-2 Result: Detected:               Patient is seen and examined at the bed side, is afebrile. She is doing better, had a bog BM as per Nursing staff, and tolerating Antibiotics well.      REVIEW OF SYSTEMS: Unable to obtain due to mental status       ALLERGIES : Shrimp (Unknown)  losartan (Angioedema)  Chicken (Stomach Upset)      Vital Signs Last 24 Hrs  T(C): 36.4 (11 Apr 2025 12:01), Max: 36.9 (10 Apr 2025 22:04)  T(F): 97.5 (11 Apr 2025 12:01), Max: 98.5 (10 Apr 2025 22:04)  HR: 68 (11 Apr 2025 12:01) (64 - 73)  BP: 124/82 (11 Apr 2025 12:01) (112/68 - 124/82)  BP(mean): --  RR: 19 (11 Apr 2025 12:01) (18 - 20)  SpO2: 97% (11 Apr 2025 12:01) (97% - 100%)    Parameters below as of 11 Apr 2025 12:01  Patient On (Oxygen Delivery Method): room air      PHYSICAL EXAM:  GENERAL: Not in distress   CHEST/LUNG:  Air entry bilaterally  HEART: s1 and s2 present  ABDOMEN:   Nondistended  EXTREMITIES: No pedal  edema  CNS: Awake and alert now      LABS: No new Labs                         13.7   6.67  )-----------( 206      ( 10 Apr 2025 07:33 )             40.0                                13.8   10.17 )-----------( 199      ( 09 Apr 2025 06:35 )             40.1         04-09    134[L]  |  97  |  21[H]  ----------------------------<  164[H]  3.8   |  28  |  0.78    Ca    8.0[L]      09 Apr 2025 06:35  Phos  2.5     04-09  Mg     2.0     04-09    TPro  5.9[L]  /  Alb  2.7[L]  /  TBili  0.6  /  DBili  x   /  AST  16  /  ALT  29  /  AlkPhos  146[H]  04-08    04-08    134[L]  |  97  |  23[H]  ----------------------------<  186[H]  3.8   |  30  |  1.02    Ca    8.3[L]      08 Apr 2025 18:59  Phos  1.9     04-08  Mg     1.6     04-08    TPro  5.9[L]  /  Alb  2.7[L]  /  TBili  0.6  /  DBili  x   /  AST  16  /  ALT  29  /  AlkPhos  146[H]  04-08        CAPILLARY BLOOD GLUCOSE  POCT Blood Glucose.: 180 mg/dL (08 Apr 2025 15:53)    ABG - ( 08 Apr 2025 22:05 )  pH, Arterial: 7.48  pH, Blood: x     /  pCO2: 39    /  pO2: 97    / HCO3: 29    / Base Excess: 5.2   /  SaO2: 98            MEDICATIONS  (STANDING):    apixaban 5 milliGRAM(s) Oral every 12 hours  atorvastatin 10 milliGRAM(s) Oral at bedtime  carvedilol 6.25 milliGRAM(s) Oral every 12 hours  cefTRIAXone   IVPB 1000 milliGRAM(s) IV Intermittent every 24 hours  chlorhexidine 2% Cloths 1 Application(s) Topical <User Schedule>  famotidine    Tablet 20 milliGRAM(s) Oral daily  fluticasone propionate/ salmeterol 100-50 MICROgram(s) Diskus 1 Dose(s) Inhalation two times a day  insulin glargine Injectable (LANTUS) 7 Unit(s) SubCutaneous at bedtime  insulin lispro (ADMELOG) corrective regimen sliding scale   SubCutaneous three times a day before meals  insulin lispro (ADMELOG) corrective regimen sliding scale   SubCutaneous at bedtime  metroNIDAZOLE  IVPB 500 milliGRAM(s) IV Intermittent every 12 hours  montelukast 10 milliGRAM(s) Oral daily  mupirocin 2% Ointment 1 Application(s) Both Nostrils two times a day  polyethylene glycol 3350 17 Gram(s) Oral two times a day  remdesivir  IVPB 100 milliGRAM(s) IV Intermittent every 24 hours  senna 2 Tablet(s) Oral at bedtime  sodium chloride 0.9%. 1000 milliLiter(s) (70 mL/Hr) IV Continuous <Continuous>  tiotropium 2.5 MICROgram(s) Inhaler 2 Puff(s) Inhalation daily        RADIOLOGY & ADDITIONAL TESTS:    4/8/25: CT Abdomen and Pelvis No Cont (04.08.25 @ 21:09) Motion limited noncontrast study. Etiology of given clinical symptoms is not clearly elucidated.    Motion limited evaluation of the pancreas. No clear evidence of acute peripancreatic inflammation. Correlate with lipase and clinical exam if   there is concern for pancreatitis.    Moderate to severe stool distention of the distal sigmoid colon and rectum with slight surrounding stranding. Correlate for constipation/stercoral proctitis.    Drop of air within the endometrial canal of unclear significance.    Left arm contrast extravasation. Marked soft tissue edema of the partially imaged left upper extremity on the first set of images.   Recommend clinical correlation to exclude compartment syndrome.    4/4/25: Xray Chest 1 View- PORTABLE-Urgent (Xray Chest 1 View- PORTABLE-Urgent .) (04.04.25 @ 18:35) >  HEART:difficult to access in this projection.  LUNGS: free of consolidation, effusion, or pneumothorax..  BONES: degenerative changes      MICROBIOLOGY DATA:    MRSA/MSSA PCR (04.09.25 @ 07:00)   MRSA PCR Result.: Detected:      Culture - Urine (04.04.25 @ 20:00)   Specimen Source: Clean Catch Clean Catch (Midstream)  Culture Results:   >=3 organisms. Probable collection contamination.    FluA/FluB/RSV/COVID PCR (04.04.25 @ 18:29)   SARS-CoV-2 Result: Detected:

## 2025-04-11 NOTE — DIETITIAN INITIAL EVALUATION ADULT - PERTINENT MEDS FT
MEDICATIONS  (STANDING):  apixaban 5 milliGRAM(s) Oral every 12 hours  atorvastatin 10 milliGRAM(s) Oral at bedtime  carvedilol 6.25 milliGRAM(s) Oral every 12 hours  cefTRIAXone   IVPB 1000 milliGRAM(s) IV Intermittent every 24 hours  chlorhexidine 2% Cloths 1 Application(s) Topical <User Schedule>  famotidine    Tablet 20 milliGRAM(s) Oral daily  fluticasone propionate/ salmeterol 100-50 MICROgram(s) Diskus 1 Dose(s) Inhalation two times a day  insulin glargine Injectable (LANTUS) 7 Unit(s) SubCutaneous at bedtime  insulin lispro (ADMELOG) corrective regimen sliding scale   SubCutaneous three times a day before meals  insulin lispro (ADMELOG) corrective regimen sliding scale   SubCutaneous at bedtime  metroNIDAZOLE  IVPB 500 milliGRAM(s) IV Intermittent every 12 hours  montelukast 10 milliGRAM(s) Oral daily  mupirocin 2% Ointment 1 Application(s) Both Nostrils two times a day  polyethylene glycol 3350 17 Gram(s) Oral two times a day  remdesivir  IVPB 100 milliGRAM(s) IV Intermittent every 24 hours  senna 2 Tablet(s) Oral at bedtime  sodium chloride 0.9%. 1000 milliLiter(s) (70 mL/Hr) IV Continuous <Continuous>  tiotropium 2.5 MICROgram(s) Inhaler 2 Puff(s) Inhalation daily    MEDICATIONS  (PRN):  acetaminophen     Tablet .. 650 milliGRAM(s) Oral every 6 hours PRN Temp greater or equal to 38C (100.4F), Mild Pain (1 - 3)  albuterol/ipratropium for Nebulization 3 milliLiter(s) Nebulizer every 6 hours PRN Bronchospasm  ondansetron Injectable 4 milliGRAM(s) IV Push every 8 hours PRN Nausea and/or Vomiting

## 2025-04-12 LAB
GLUCOSE BLDC GLUCOMTR-MCNC: 121 MG/DL — HIGH (ref 70–99)
GLUCOSE BLDC GLUCOMTR-MCNC: 142 MG/DL — HIGH (ref 70–99)
GLUCOSE BLDC GLUCOMTR-MCNC: 174 MG/DL — HIGH (ref 70–99)
GLUCOSE BLDC GLUCOMTR-MCNC: 182 MG/DL — HIGH (ref 70–99)

## 2025-04-12 RX ADMIN — POLYETHYLENE GLYCOL 3350 17 GRAM(S): 17 POWDER, FOR SOLUTION ORAL at 06:13

## 2025-04-12 RX ADMIN — MONTELUKAST SODIUM 10 MILLIGRAM(S): 10 TABLET ORAL at 11:45

## 2025-04-12 RX ADMIN — INSULIN LISPRO 1: 100 INJECTION, SOLUTION INTRAVENOUS; SUBCUTANEOUS at 12:10

## 2025-04-12 RX ADMIN — CARVEDILOL 6.25 MILLIGRAM(S): 3.12 TABLET, FILM COATED ORAL at 06:05

## 2025-04-12 RX ADMIN — APIXABAN 5 MILLIGRAM(S): 2.5 TABLET, FILM COATED ORAL at 17:38

## 2025-04-12 RX ADMIN — INSULIN GLARGINE-YFGN 7 UNIT(S): 100 INJECTION, SOLUTION SUBCUTANEOUS at 22:02

## 2025-04-12 RX ADMIN — POLYETHYLENE GLYCOL 3350 17 GRAM(S): 17 POWDER, FOR SOLUTION ORAL at 17:38

## 2025-04-12 RX ADMIN — Medication 100 MILLIGRAM(S): at 06:03

## 2025-04-12 RX ADMIN — CEFTRIAXONE 100 MILLIGRAM(S): 500 INJECTION, POWDER, FOR SOLUTION INTRAMUSCULAR; INTRAVENOUS at 06:13

## 2025-04-12 RX ADMIN — ATORVASTATIN CALCIUM 10 MILLIGRAM(S): 80 TABLET, FILM COATED ORAL at 22:03

## 2025-04-12 RX ADMIN — Medication 100 MILLIGRAM(S): at 17:38

## 2025-04-12 RX ADMIN — Medication 20 MILLIGRAM(S): at 11:46

## 2025-04-12 RX ADMIN — MUPIROCIN CALCIUM 1 APPLICATION(S): 20 CREAM TOPICAL at 06:03

## 2025-04-12 RX ADMIN — Medication 2 TABLET(S): at 22:02

## 2025-04-12 RX ADMIN — Medication 1 DOSE(S): at 22:01

## 2025-04-12 RX ADMIN — Medication 1 APPLICATION(S): at 06:02

## 2025-04-12 RX ADMIN — MUPIROCIN CALCIUM 1 APPLICATION(S): 20 CREAM TOPICAL at 17:38

## 2025-04-12 RX ADMIN — CARVEDILOL 6.25 MILLIGRAM(S): 3.12 TABLET, FILM COATED ORAL at 17:37

## 2025-04-12 RX ADMIN — Medication 1 DOSE(S): at 11:44

## 2025-04-12 RX ADMIN — TIOTROPIUM BROMIDE INHALATION SPRAY 2 PUFF(S): 3.12 SPRAY, METERED RESPIRATORY (INHALATION) at 11:48

## 2025-04-12 RX ADMIN — APIXABAN 5 MILLIGRAM(S): 2.5 TABLET, FILM COATED ORAL at 06:03

## 2025-04-12 NOTE — PROGRESS NOTE ADULT - SUBJECTIVE AND OBJECTIVE BOX
Patient was seen and examined  Patient is a 89y old  Female who presents with a chief complaint of Colitis (11 Apr 2025 16:25)      INTERVAL HPI/OVERNIGHT EVENTS:  T(C): 36.6 (04-12-25 @ 06:26), Max: 37 (04-11-25 @ 20:05)  HR: 69 (04-12-25 @ 08:31) (68 - 85)  BP: 115/63 (04-12-25 @ 06:26) (115/63 - 124/82)  RR: 19 (04-12-25 @ 06:26) (19 - 19)  SpO2: 96% (04-12-25 @ 08:31) (96% - 100%)  Wt(kg): --  I&O's Summary    11 Apr 2025 07:01  -  12 Apr 2025 07:00  --------------------------------------------------------  IN: 0 mL / OUT: 500 mL / NET: -500 mL        LABS:              CAPILLARY BLOOD GLUCOSE      POCT Blood Glucose.: 121 mg/dL (12 Apr 2025 08:03)  POCT Blood Glucose.: 212 mg/dL (11 Apr 2025 21:03)  POCT Blood Glucose.: 164 mg/dL (11 Apr 2025 17:00)  POCT Blood Glucose.: 156 mg/dL (11 Apr 2025 11:49)              MEDICATIONS  (STANDING):  apixaban 5 milliGRAM(s) Oral every 12 hours  atorvastatin 10 milliGRAM(s) Oral at bedtime  carvedilol 6.25 milliGRAM(s) Oral every 12 hours  cefTRIAXone   IVPB 1000 milliGRAM(s) IV Intermittent every 24 hours  chlorhexidine 2% Cloths 1 Application(s) Topical <User Schedule>  famotidine    Tablet 20 milliGRAM(s) Oral daily  fluticasone propionate/ salmeterol 100-50 MICROgram(s) Diskus 1 Dose(s) Inhalation two times a day  insulin glargine Injectable (LANTUS) 7 Unit(s) SubCutaneous at bedtime  insulin lispro (ADMELOG) corrective regimen sliding scale   SubCutaneous three times a day before meals  insulin lispro (ADMELOG) corrective regimen sliding scale   SubCutaneous at bedtime  metroNIDAZOLE  IVPB 500 milliGRAM(s) IV Intermittent every 12 hours  montelukast 10 milliGRAM(s) Oral daily  mupirocin 2% Ointment 1 Application(s) Both Nostrils two times a day  polyethylene glycol 3350 17 Gram(s) Oral two times a day  senna 2 Tablet(s) Oral at bedtime  sodium chloride 0.9%. 1000 milliLiter(s) (70 mL/Hr) IV Continuous <Continuous>  tiotropium 2.5 MICROgram(s) Inhaler 2 Puff(s) Inhalation daily    MEDICATIONS  (PRN):  acetaminophen     Tablet .. 650 milliGRAM(s) Oral every 6 hours PRN Temp greater or equal to 38C (100.4F), Mild Pain (1 - 3)  albuterol/ipratropium for Nebulization 3 milliLiter(s) Nebulizer every 6 hours PRN Bronchospasm  ondansetron Injectable 4 milliGRAM(s) IV Push every 8 hours PRN Nausea and/or Vomiting      RADIOLOGY & ADDITIONAL TESTS:    Imaging Personally Reviewed:  [ ] YES  [ ] NO    REVIEW OF SYSTEMS:  CONSTITUTIONAL: No fever, weight loss, or fatigue  EYES: No eye pain, visual disturbances, or discharge  ENMT:  No difficulty hearing, tinnitus, vertigo; No sinus or throat pain  NECK: No pain or stiffness  BREASTS: No pain, masses, or nipple discharge  RESPIRATORY: No cough, wheezing, chills or hemoptysis; No shortness of breath  CARDIOVASCULAR: No chest pain, palpitations, dizziness, or leg swelling  GASTROINTESTINAL: No abdominal or epigastric pain. No nausea, vomiting, or hematemesis; No diarrhea or constipation. No melena or hematochezia.  GENITOURINARY: No dysuria, frequency, hematuria, or incontinence  NEUROLOGICAL: No headaches, memory loss, loss of strength, numbness, or tremors  SKIN: No itching, burning, rashes, or lesions   LYMPH NODES: No enlarged glands  ENDOCRINE: No heat or cold intolerance; No hair loss  MUSCULOSKELETAL: No joint pain or swelling; No muscle, back, or extremity pain  PSYCHIATRIC: No depression, anxiety, mood swings, or difficulty sleeping  HEME/LYMPH: No easy bruising, or bleeding gums  ALLERY AND IMMUNOLOGIC: No hives or eczema      Consultant(s) Notes Reviewed:  [ x ] YES  [ ] NO    PHYSICAL EXAM:  GENERAL: NAD, well-groomed, well-developed  HEAD:  Atraumatic, Normocephalic  EYES: poor vision EOMI, PERRLA, conjunctiva and sclera clear  ENMT: No tonsillar erythema, exudates, or enlargement; Moist mucous membranes, Good dentition, No lesions  NECK: Supple, No JVD, Normal thyroid  NERVOUS SYSTEM:  Alert & Oriented X3, Good concentration; Motor Strength 5/5 B/L upper and lower extremities; DTRs 2+ intact and symmetric  CHEST/LUNG: Clear to percussion bilaterally; No rales, rhonchi, wheezing, or rubs  HEART: Regular rate and rhythm; No murmurs, rubs, or gallops  ABDOMEN: Soft, Nontender, Nondistended; Bowel sounds present  EXTREMITIES:  2+ Peripheral Pulses, No clubbing, cyanosis, or edema  LYMPH: No lymphadenopathy noted  SKIN: No rashes or lesions    Care Discussed with Consultants/Other Providers [ x] YES  [ ] NO

## 2025-04-12 NOTE — PROGRESS NOTE ADULT - ASSESSMENT
89 yo F COPD, CHF, AFIB ( on eliquis), DM that comes in for abdominal paincolitis,COVID+.  1.Colitis-abx.  2.COVID+-Pulm eval.On remdesmivir as per ID.  3.Afib-eliquis,coreg.  4.DM-Insulin.  5.COPD-MDI.  6.PPI.

## 2025-04-12 NOTE — CONSULT NOTE ADULT - PROBLEM SELECTOR RECOMMENDATION 3
isolation precautions  oxygen supp prn  monitor oxygen sat  follow up Covid PCR  check LDH, LFTs, D-Dimer, Ferritin, CRP and procalcitonin  CXR  Vit C, D and Zinc supp  Bronchodilators

## 2025-04-12 NOTE — PROGRESS NOTE ADULT - SUBJECTIVE AND OBJECTIVE BOX
Patient is seen and examined at the bed side, is afebrile. She is doing better, had a bog BM as per Nursing staff, and tolerating Antibiotics well.      REVIEW OF SYSTEMS: Unable to obtain due to mental status       ALLERGIES : Shrimp (Unknown)  losartan (Angioedema)  Chicken (Stomach Upset)      Vital Signs Last 24 Hrs  T(C): 37.1 (12 Apr 2025 12:34), Max: 37.1 (12 Apr 2025 12:34)  T(F): 98.7 (12 Apr 2025 12:34), Max: 98.7 (12 Apr 2025 12:34)  HR: 57 (12 Apr 2025 12:34) (57 - 85)  BP: 120/80 (12 Apr 2025 12:34) (115/63 - 120/80)  BP(mean): 94 (12 Apr 2025 06:26) (94 - 94)  RR: 20 (12 Apr 2025 12:34) (19 - 20)  SpO2: 100% (12 Apr 2025 12:34) (96% - 100%)    Parameters below as of 12 Apr 2025 12:34  Patient On (Oxygen Delivery Method): room air      PHYSICAL EXAM:  GENERAL: Not in distress   CHEST/LUNG:  Air entry bilaterally  HEART: s1 and s2 present  ABDOMEN:   Nondistended  EXTREMITIES: No pedal  edema  CNS: Awake and alert now      LABS: No new Labs                           13.7   6.67  )-----------( 206      ( 10 Apr 2025 07:33 )             40.0                                13.8   10.17 )-----------( 199      ( 09 Apr 2025 06:35 )             40.1         04-09    134[L]  |  97  |  21[H]  ----------------------------<  164[H]  3.8   |  28  |  0.78    Ca    8.0[L]      09 Apr 2025 06:35  Phos  2.5     04-09  Mg     2.0     04-09    TPro  5.9[L]  /  Alb  2.7[L]  /  TBili  0.6  /  DBili  x   /  AST  16  /  ALT  29  /  AlkPhos  146[H]  04-08 04-08    134[L]  |  97  |  23[H]  ----------------------------<  186[H]  3.8   |  30  |  1.02    Ca    8.3[L]      08 Apr 2025 18:59  Phos  1.9     04-08  Mg     1.6     04-08    TPro  5.9[L]  /  Alb  2.7[L]  /  TBili  0.6  /  DBili  x   /  AST  16  /  ALT  29  /  AlkPhos  146[H]  04-08        CAPILLARY BLOOD GLUCOSE  POCT Blood Glucose.: 180 mg/dL (08 Apr 2025 15:53)    ABG - ( 08 Apr 2025 22:05 )  pH, Arterial: 7.48  pH, Blood: x     /  pCO2: 39    /  pO2: 97    / HCO3: 29    / Base Excess: 5.2   /  SaO2: 98            MEDICATIONS  (STANDING):    apixaban 5 milliGRAM(s) Oral every 12 hours  atorvastatin 10 milliGRAM(s) Oral at bedtime  carvedilol 6.25 milliGRAM(s) Oral every 12 hours  cefTRIAXone   IVPB 1000 milliGRAM(s) IV Intermittent every 24 hours  chlorhexidine 2% Cloths 1 Application(s) Topical <User Schedule>  famotidine    Tablet 20 milliGRAM(s) Oral daily  fluticasone propionate/ salmeterol 100-50 MICROgram(s) Diskus 1 Dose(s) Inhalation two times a day  insulin glargine Injectable (LANTUS) 7 Unit(s) SubCutaneous at bedtime  insulin lispro (ADMELOG) corrective regimen sliding scale   SubCutaneous three times a day before meals  insulin lispro (ADMELOG) corrective regimen sliding scale   SubCutaneous at bedtime  metroNIDAZOLE  IVPB 500 milliGRAM(s) IV Intermittent every 12 hours  montelukast 10 milliGRAM(s) Oral daily  mupirocin 2% Ointment 1 Application(s) Both Nostrils two times a day  polyethylene glycol 3350 17 Gram(s) Oral two times a day  senna 2 Tablet(s) Oral at bedtime  sodium chloride 0.9%. 1000 milliLiter(s) (70 mL/Hr) IV Continuous <Continuous>  tiotropium 2.5 MICROgram(s) Inhaler 2 Puff(s) Inhalation daily        RADIOLOGY & ADDITIONAL TESTS:    4/8/25: CT Abdomen and Pelvis No Cont (04.08.25 @ 21:09) Motion limited noncontrast study. Etiology of given clinical symptoms is not clearly elucidated.    Motion limited evaluation of the pancreas. No clear evidence of acute peripancreatic inflammation. Correlate with lipase and clinical exam if   there is concern for pancreatitis.    Moderate to severe stool distention of the distal sigmoid colon and rectum with slight surrounding stranding. Correlate for constipation/stercoral proctitis.    Drop of air within the endometrial canal of unclear significance.    Left arm contrast extravasation. Marked soft tissue edema of the partially imaged left upper extremity on the first set of images.   Recommend clinical correlation to exclude compartment syndrome.    4/4/25: Xray Chest 1 View- PORTABLE-Urgent (Xray Chest 1 View- PORTABLE-Urgent .) (04.04.25 @ 18:35) >  HEART:difficult to access in this projection.  LUNGS: free of consolidation, effusion, or pneumothorax..  BONES: degenerative changes      MICROBIOLOGY DATA:    MRSA/MSSA PCR (04.09.25 @ 07:00)   MRSA PCR Result.: Detected:      Culture - Urine (04.04.25 @ 20:00)   Specimen Source: Clean Catch Clean Catch (Midstream)  Culture Results:   >=3 organisms. Probable collection contamination.    FluA/FluB/RSV/COVID PCR (04.04.25 @ 18:29)   SARS-CoV-2 Result: Detected:             Patient is seen and examined at the bed side, is afebrile. She has completed the course of Remdesivir.      REVIEW OF SYSTEMS: Unable to obtain due to mental status       ALLERGIES : Shrimp (Unknown)  losartan (Angioedema)  Chicken (Stomach Upset)      Vital Signs Last 24 Hrs  T(C): 37.1 (12 Apr 2025 12:34), Max: 37.1 (12 Apr 2025 12:34)  T(F): 98.7 (12 Apr 2025 12:34), Max: 98.7 (12 Apr 2025 12:34)  HR: 57 (12 Apr 2025 12:34) (57 - 85)  BP: 120/80 (12 Apr 2025 12:34) (115/63 - 120/80)  BP(mean): 94 (12 Apr 2025 06:26) (94 - 94)  RR: 20 (12 Apr 2025 12:34) (19 - 20)  SpO2: 100% (12 Apr 2025 12:34) (96% - 100%)    Parameters below as of 12 Apr 2025 12:34  Patient On (Oxygen Delivery Method): room air      PHYSICAL EXAM:  GENERAL: Not in distress   CHEST/LUNG:  Air entry bilaterally  HEART: s1 and s2 present  ABDOMEN:   Nondistended  EXTREMITIES: No pedal  edema  CNS: Awake and alert now      LABS: No new Labs                           13.7   6.67  )-----------( 206      ( 10 Apr 2025 07:33 )             40.0                                13.8   10.17 )-----------( 199      ( 09 Apr 2025 06:35 )             40.1         04-09    134[L]  |  97  |  21[H]  ----------------------------<  164[H]  3.8   |  28  |  0.78    Ca    8.0[L]      09 Apr 2025 06:35  Phos  2.5     04-09  Mg     2.0     04-09    TPro  5.9[L]  /  Alb  2.7[L]  /  TBili  0.6  /  DBili  x   /  AST  16  /  ALT  29  /  AlkPhos  146[H]  04-08    04-08    134[L]  |  97  |  23[H]  ----------------------------<  186[H]  3.8   |  30  |  1.02    Ca    8.3[L]      08 Apr 2025 18:59  Phos  1.9     04-08  Mg     1.6     04-08    TPro  5.9[L]  /  Alb  2.7[L]  /  TBili  0.6  /  DBili  x   /  AST  16  /  ALT  29  /  AlkPhos  146[H]  04-08        CAPILLARY BLOOD GLUCOSE  POCT Blood Glucose.: 180 mg/dL (08 Apr 2025 15:53)    ABG - ( 08 Apr 2025 22:05 )  pH, Arterial: 7.48  pH, Blood: x     /  pCO2: 39    /  pO2: 97    / HCO3: 29    / Base Excess: 5.2   /  SaO2: 98            MEDICATIONS  (STANDING):    apixaban 5 milliGRAM(s) Oral every 12 hours  atorvastatin 10 milliGRAM(s) Oral at bedtime  carvedilol 6.25 milliGRAM(s) Oral every 12 hours  cefTRIAXone   IVPB 1000 milliGRAM(s) IV Intermittent every 24 hours  chlorhexidine 2% Cloths 1 Application(s) Topical <User Schedule>  famotidine    Tablet 20 milliGRAM(s) Oral daily  fluticasone propionate/ salmeterol 100-50 MICROgram(s) Diskus 1 Dose(s) Inhalation two times a day  insulin glargine Injectable (LANTUS) 7 Unit(s) SubCutaneous at bedtime  insulin lispro (ADMELOG) corrective regimen sliding scale   SubCutaneous three times a day before meals  insulin lispro (ADMELOG) corrective regimen sliding scale   SubCutaneous at bedtime  metroNIDAZOLE  IVPB 500 milliGRAM(s) IV Intermittent every 12 hours  montelukast 10 milliGRAM(s) Oral daily  mupirocin 2% Ointment 1 Application(s) Both Nostrils two times a day  polyethylene glycol 3350 17 Gram(s) Oral two times a day  senna 2 Tablet(s) Oral at bedtime  sodium chloride 0.9%. 1000 milliLiter(s) (70 mL/Hr) IV Continuous <Continuous>  tiotropium 2.5 MICROgram(s) Inhaler 2 Puff(s) Inhalation daily        RADIOLOGY & ADDITIONAL TESTS:    4/8/25: CT Abdomen and Pelvis No Cont (04.08.25 @ 21:09) Motion limited noncontrast study. Etiology of given clinical symptoms is not clearly elucidated.    Motion limited evaluation of the pancreas. No clear evidence of acute peripancreatic inflammation. Correlate with lipase and clinical exam if   there is concern for pancreatitis.    Moderate to severe stool distention of the distal sigmoid colon and rectum with slight surrounding stranding. Correlate for constipation/stercoral proctitis.    Drop of air within the endometrial canal of unclear significance.    Left arm contrast extravasation. Marked soft tissue edema of the partially imaged left upper extremity on the first set of images.   Recommend clinical correlation to exclude compartment syndrome.    4/4/25: Xray Chest 1 View- PORTABLE-Urgent (Xray Chest 1 View- PORTABLE-Urgent .) (04.04.25 @ 18:35) >  HEART:difficult to access in this projection.  LUNGS: free of consolidation, effusion, or pneumothorax..  BONES: degenerative changes      MICROBIOLOGY DATA:    MRSA/MSSA PCR (04.09.25 @ 07:00)   MRSA PCR Result.: Detected:      Culture - Urine (04.04.25 @ 20:00)   Specimen Source: Clean Catch Clean Catch (Midstream)  Culture Results:   >=3 organisms. Probable collection contamination.    FluA/FluB/RSV/COVID PCR (04.04.25 @ 18:29)   SARS-CoV-2 Result: Detected:

## 2025-04-12 NOTE — PROGRESS NOTE ADULT - SUBJECTIVE AND OBJECTIVE BOX
Date of Service 04-12-25 @ 12:54    CHIEF COMPLAINT:Patient is a 89y old  Female who presents with a chief complaint of Colitis .Pt appears comfortable.    	  REVIEW OF SYSTEMS:  CONSTITUTIONAL: No fever, weight loss, or fatigue  EYES: No eye pain, visual disturbances, or discharge  ENT:  No difficulty hearing, tinnitus, vertigo; No sinus or throat pain  NECK: No pain or stiffness  RESPIRATORY: No cough, wheezing, chills or hemoptysis; No Shortness of Breath  CARDIOVASCULAR: No chest pain, palpitations, passing out, dizziness, or leg swelling  GASTROINTESTINAL: No abdominal or epigastric pain. No nausea, vomiting, or hematemesis; No diarrhea or constipation. No melena or hematochezia.  GENITOURINARY: No dysuria, frequency, hematuria, or incontinence  NEUROLOGICAL: No headaches, memory loss, loss of strength, numbness, or tremors  SKIN: No itching, burning, rashes, or lesions   LYMPH Nodes: No enlarged glands  ENDOCRINE: No heat or cold intolerance; No hair loss  MUSCULOSKELETAL: No joint pain or swelling; No muscle, back, or extremity pain  PSYCHIATRIC: No depression, anxiety, mood swings, or difficulty sleeping  HEME/LYMPH: No easy bruising, or bleeding gums  ALLERGY AND IMMUNOLOGIC: No hives or eczema	        PHYSICAL EXAM:  T(C): 37.1 (04-12-25 @ 12:34), Max: 37.1 (04-12-25 @ 12:34)  HR: 57 (04-12-25 @ 12:34) (57 - 85)  BP: 120/80 (04-12-25 @ 12:34) (115/63 - 120/80)  RR: 20 (04-12-25 @ 12:34) (19 - 20)  SpO2: 100% (04-12-25 @ 12:34) (96% - 100%)  Wt(kg): --  I&O's Summary    11 Apr 2025 07:01  -  12 Apr 2025 07:00  --------------------------------------------------------  IN: 0 mL / OUT: 500 mL / NET: -500 mL        Appearance: Normal	  HEENT:   Normal oral mucosa, PERRL, EOMI	  Lymphatic: No lymphadenopathy  Cardiovascular: Normal S1 S2, No JVD, No murmurs, No edema  Respiratory: Lungs clear to auscultation	  Psychiatry: A & O x 3, Mood & affect appropriate  Gastrointestinal:  Soft, Non-tender, + BS	  Skin: No rashes, No ecchymoses, No cyanosis	  Neurologic: Non-focal  Extremities: Normal range of motion, No clubbing, cyanosis or edema  Vascular: Peripheral pulses palpable 2+ bilaterally    MEDICATIONS  (STANDING):  apixaban 5 milliGRAM(s) Oral every 12 hours  atorvastatin 10 milliGRAM(s) Oral at bedtime  carvedilol 6.25 milliGRAM(s) Oral every 12 hours  cefTRIAXone   IVPB 1000 milliGRAM(s) IV Intermittent every 24 hours  chlorhexidine 2% Cloths 1 Application(s) Topical <User Schedule>  famotidine    Tablet 20 milliGRAM(s) Oral daily  fluticasone propionate/ salmeterol 100-50 MICROgram(s) Diskus 1 Dose(s) Inhalation two times a day  insulin glargine Injectable (LANTUS) 7 Unit(s) SubCutaneous at bedtime  insulin lispro (ADMELOG) corrective regimen sliding scale   SubCutaneous three times a day before meals  insulin lispro (ADMELOG) corrective regimen sliding scale   SubCutaneous at bedtime  metroNIDAZOLE  IVPB 500 milliGRAM(s) IV Intermittent every 12 hours  montelukast 10 milliGRAM(s) Oral daily  mupirocin 2% Ointment 1 Application(s) Both Nostrils two times a day  polyethylene glycol 3350 17 Gram(s) Oral two times a day  senna 2 Tablet(s) Oral at bedtime  sodium chloride 0.9%. 1000 milliLiter(s) (70 mL/Hr) IV Continuous <Continuous>  tiotropium 2.5 MICROgram(s) Inhaler 2 Puff(s) Inhalation daily        LABS:	 	    TSH: Thyroid Stimulating Hormone, Serum: 1.92 uU/mL (03-24 @ 06:16)

## 2025-04-12 NOTE — PROGRESS NOTE ADULT - PROBLEM SELECTOR PLAN 8
Pt's last day of Remdesivir is YESTERDAY   PT FU
Pt's last day of Remdesivir is today.  D/c likely over the weekend

## 2025-04-12 NOTE — CONSULT NOTE ADULT - SUBJECTIVE AND OBJECTIVE BOX
PULMONARY CONSULT NOTE      NUNO LAST  MRN-[id number]    History of Present Illness:  Reason for Admission: Colitis  History of Present Illness:   Patient is a 89 yo F COPD, CHF, AFIB ( on eliquis), DM that comes in for abdmoinal pain. Patient a poor historian at bedside, unable to reach over the phone to daughter. Pateint endoring that today was feeling unwell with weakness, dizziness after physical therapy. Per ED note patient was also noted to be sleepy. Pateint endorses that has been going to the bathroom to poop consistently, hard stools, last one this morning. Patient denies any other associated symptoms such as SOB,CP,N/V/D or any urinary symptoms.         HISTORY OF PRESENT ILLNESS: As Above. Awake, alert, lying in bed in NAD    MEDICATIONS  (STANDING):  apixaban 5 milliGRAM(s) Oral every 12 hours  atorvastatin 10 milliGRAM(s) Oral at bedtime  carvedilol 6.25 milliGRAM(s) Oral every 12 hours  cefTRIAXone   IVPB 1000 milliGRAM(s) IV Intermittent every 24 hours  chlorhexidine 2% Cloths 1 Application(s) Topical <User Schedule>  famotidine    Tablet 20 milliGRAM(s) Oral daily  fluticasone propionate/ salmeterol 100-50 MICROgram(s) Diskus 1 Dose(s) Inhalation two times a day  insulin glargine Injectable (LANTUS) 7 Unit(s) SubCutaneous at bedtime  insulin lispro (ADMELOG) corrective regimen sliding scale   SubCutaneous three times a day before meals  insulin lispro (ADMELOG) corrective regimen sliding scale   SubCutaneous at bedtime  metroNIDAZOLE  IVPB 500 milliGRAM(s) IV Intermittent every 12 hours  montelukast 10 milliGRAM(s) Oral daily  mupirocin 2% Ointment 1 Application(s) Both Nostrils two times a day  polyethylene glycol 3350 17 Gram(s) Oral two times a day  senna 2 Tablet(s) Oral at bedtime  sodium chloride 0.9%. 1000 milliLiter(s) (70 mL/Hr) IV Continuous <Continuous>  tiotropium 2.5 MICROgram(s) Inhaler 2 Puff(s) Inhalation daily      MEDICATIONS  (PRN):  acetaminophen     Tablet .. 650 milliGRAM(s) Oral every 6 hours PRN Temp greater or equal to 38C (100.4F), Mild Pain (1 - 3)  albuterol/ipratropium for Nebulization 3 milliLiter(s) Nebulizer every 6 hours PRN Bronchospasm  ondansetron Injectable 4 milliGRAM(s) IV Push every 8 hours PRN Nausea and/or Vomiting      Allergies    Shrimp (Unknown)  losartan (Angioedema)    Intolerances    Chicken (Stomach Upset)      PAST MEDICAL & SURGICAL HISTORY:  Arthritis      Legally blind      Pre-diabetes      Breast cancer  right, no chemo or radiation      COPD exacerbation      Atrial fibrillation      HF (heart failure)  HFpEF 7/29      Deep vein thrombosis (DVT)  Left Lower Extremity      HLD (hyperlipidemia)      COPD exacerbation      HTN (hypertension)      No significant past surgical history          FAMILY HISTORY:  FH: HTN (hypertension)        SOCIAL HISTORY  Smoking History:     REVIEW OF SYSTEMS:    CONSTITUTIONAL:  No fevers, chills, sweats    HEENT:  Eyes:  No diplopia or blurred vision. ENT:  No earache, sore throat or runny nose.    CARDIOVASCULAR:  No pressure, squeezing, tightness, or heaviness about the chest; no palpitations.    RESPIRATORY:  Per HPI    GASTROINTESTINAL:  No abdominal pain, nausea, vomiting or diarrhea.    GENITOURINARY:  No dysuria, frequency or urgency.    NEUROLOGIC:  No paresthesias, fasciculations, seizures or weakness.    PSYCHIATRIC:  No disorder of thought or mood.    Vital Signs Last 24 Hrs  T(C): 36.6 (12 Apr 2025 06:26), Max: 37 (11 Apr 2025 20:05)  T(F): 97.9 (12 Apr 2025 06:26), Max: 98.6 (11 Apr 2025 20:05)  HR: 69 (12 Apr 2025 08:31) (68 - 85)  BP: 115/63 (12 Apr 2025 06:26) (115/63 - 124/82)  BP(mean): 94 (12 Apr 2025 06:26) (94 - 94)  RR: 19 (12 Apr 2025 06:26) (19 - 19)  SpO2: 96% (12 Apr 2025 08:31) (96% - 100%)    Parameters below as of 12 Apr 2025 06:26  Patient On (Oxygen Delivery Method): BiPAP/CPAP      I&O's Detail    11 Apr 2025 07:01  -  12 Apr 2025 07:00  --------------------------------------------------------  IN:  Total IN: 0 mL    OUT:    Voided (mL): 500 mL  Total OUT: 500 mL    Total NET: -500 mL          PHYSICAL EXAMINATION:    GENERAL: The patient is a well-developed, well-nourished _____in no apparent distress.     HEENT: Head is normocephalic and atraumatic. Extraocular muscles are intact. Mucous membranes are moist.     NECK: Supple.     LUNGS: Clear to auscultation without wheezing, rales, or rhonchi. Respirations unlabored    HEART: Regular rate and rhythm without murmur.    ABDOMEN: Soft, nontender, and nondistended.  No hepatosplenomegaly is noted.    EXTREMITIES: Without any cyanosis, clubbing, rash, lesions or edema.    NEUROLOGIC: Grossly intact.      LABS:                              MICROBIOLOGY:    RADIOLOGY & ADDITIONAL STUDIES:    CXR:    Ct scan chest;    ekg;    echo: Yes

## 2025-04-12 NOTE — PROGRESS NOTE ADULT - ASSESSMENT
Patient is a 89y old  Female with COPD, CHF, AFIB ( on eliquis), DM that comes in for abdominal pain. Patient a poor historian at bedside, unable to reach over the phone to daughter. Patient endoring that today was feeling unwell with weakness, dizziness after physical therapy. Per ED note patient was also noted to be sleepy. On admission, she found to have no fever but CT abd/pelvis shows " Moderate to severe stool distention of the distal sigmoid colon and rectum with slight surrounding stranding. Correlate for constipation/stercoral proctitis." She has started on Ceftriaxone and Flagyl and the ID consult requested to assist with further evaluation and antibiotic management.    # Constipation/stercoral proctitis  # Positive COVID PCR    would recommend:    1. Aggressive bowel regimen to prevent further worsening of stercoral proctitis  2. Continue Remdesivir to complete the course  3. Continue Ceftriaxone and Flagyl and may change to oral Augmentin 875mg q 12hours on discharge to continue until 4/15/25  4. Aspiration and COVID precaution  5. OOB to chair     d/w covering Marky ROD    Attending Attestation:    Spent more than 35 minutes on total encounter, more than 50 % of the visit was spent counseling and/or coordinating care by the Attending physician.   Patient is a 89y old  Female with COPD, CHF, AFIB ( on eliquis), DM that comes in for abdominal pain. Patient a poor historian at bedside, unable to reach over the phone to daughter. Patient endoring that today was feeling unwell with weakness, dizziness after physical therapy. Per ED note patient was also noted to be sleepy. On admission, she found to have no fever but CT abd/pelvis shows " Moderate to severe stool distention of the distal sigmoid colon and rectum with slight surrounding stranding. Correlate for constipation/stercoral proctitis." She has started on Ceftriaxone and Flagyl and the ID consult requested to assist with further evaluation and antibiotic management.    # Constipation/stercoral proctitis  # Positive COVID PCR - s/p completed the course of Remdesivir    would recommend:    1. PT/OOB to chair  2. Continue Aggressive bowel regimen to prevent further worsening of stercoral proctitis  3. Continue Ceftriaxone and Flagyl  while inpatient and may change to oral Augmentin 875mg q 12hours on discharge to continue until 4/15/25  4. Aspiration precaution    d/w covering Marky ROD    Attending Attestation:    Spent more than 35 minutes on total encounter, more than 50 % of the visit was spent counseling and/or coordinating care by the Attending physician.

## 2025-04-13 LAB
ANION GAP SERPL CALC-SCNC: 5 MMOL/L — SIGNIFICANT CHANGE UP (ref 5–17)
BUN SERPL-MCNC: 13 MG/DL — SIGNIFICANT CHANGE UP (ref 7–18)
CALCIUM SERPL-MCNC: 8.2 MG/DL — LOW (ref 8.4–10.5)
CHLORIDE SERPL-SCNC: 105 MMOL/L — SIGNIFICANT CHANGE UP (ref 96–108)
CO2 SERPL-SCNC: 27 MMOL/L — SIGNIFICANT CHANGE UP (ref 22–31)
CREAT SERPL-MCNC: 0.61 MG/DL — SIGNIFICANT CHANGE UP (ref 0.5–1.3)
EGFR: 83 ML/MIN/1.73M2 — SIGNIFICANT CHANGE UP
EGFR: 83 ML/MIN/1.73M2 — SIGNIFICANT CHANGE UP
GLUCOSE BLDC GLUCOMTR-MCNC: 110 MG/DL — HIGH (ref 70–99)
GLUCOSE BLDC GLUCOMTR-MCNC: 128 MG/DL — HIGH (ref 70–99)
GLUCOSE BLDC GLUCOMTR-MCNC: 164 MG/DL — HIGH (ref 70–99)
GLUCOSE BLDC GLUCOMTR-MCNC: 193 MG/DL — HIGH (ref 70–99)
GLUCOSE SERPL-MCNC: 118 MG/DL — HIGH (ref 70–99)
HCT VFR BLD CALC: 40.3 % — SIGNIFICANT CHANGE UP (ref 34.5–45)
HGB BLD-MCNC: 13.7 G/DL — SIGNIFICANT CHANGE UP (ref 11.5–15.5)
MCHC RBC-ENTMCNC: 32 PG — SIGNIFICANT CHANGE UP (ref 27–34)
MCHC RBC-ENTMCNC: 34 G/DL — SIGNIFICANT CHANGE UP (ref 32–36)
MCV RBC AUTO: 94.2 FL — SIGNIFICANT CHANGE UP (ref 80–100)
NRBC BLD AUTO-RTO: 0 /100 WBCS — SIGNIFICANT CHANGE UP (ref 0–0)
PLATELET # BLD AUTO: 163 K/UL — SIGNIFICANT CHANGE UP (ref 150–400)
POTASSIUM SERPL-MCNC: 4 MMOL/L — SIGNIFICANT CHANGE UP (ref 3.5–5.3)
POTASSIUM SERPL-SCNC: 4 MMOL/L — SIGNIFICANT CHANGE UP (ref 3.5–5.3)
RBC # BLD: 4.28 M/UL — SIGNIFICANT CHANGE UP (ref 3.8–5.2)
RBC # FLD: 13.5 % — SIGNIFICANT CHANGE UP (ref 10.3–14.5)
SODIUM SERPL-SCNC: 137 MMOL/L — SIGNIFICANT CHANGE UP (ref 135–145)
WBC # BLD: 5.04 K/UL — SIGNIFICANT CHANGE UP (ref 3.8–10.5)
WBC # FLD AUTO: 5.04 K/UL — SIGNIFICANT CHANGE UP (ref 3.8–10.5)

## 2025-04-13 RX ADMIN — Medication 1 DOSE(S): at 11:15

## 2025-04-13 RX ADMIN — INSULIN LISPRO 1: 100 INJECTION, SOLUTION INTRAVENOUS; SUBCUTANEOUS at 11:58

## 2025-04-13 RX ADMIN — TIOTROPIUM BROMIDE INHALATION SPRAY 2 PUFF(S): 3.12 SPRAY, METERED RESPIRATORY (INHALATION) at 11:59

## 2025-04-13 RX ADMIN — Medication 100 MILLIGRAM(S): at 17:09

## 2025-04-13 RX ADMIN — MUPIROCIN CALCIUM 1 APPLICATION(S): 20 CREAM TOPICAL at 17:15

## 2025-04-13 RX ADMIN — INSULIN GLARGINE-YFGN 7 UNIT(S): 100 INJECTION, SOLUTION SUBCUTANEOUS at 21:14

## 2025-04-13 RX ADMIN — Medication 20 MILLIGRAM(S): at 11:16

## 2025-04-13 RX ADMIN — ATORVASTATIN CALCIUM 10 MILLIGRAM(S): 80 TABLET, FILM COATED ORAL at 21:14

## 2025-04-13 RX ADMIN — APIXABAN 5 MILLIGRAM(S): 2.5 TABLET, FILM COATED ORAL at 17:11

## 2025-04-13 RX ADMIN — POLYETHYLENE GLYCOL 3350 17 GRAM(S): 17 POWDER, FOR SOLUTION ORAL at 06:42

## 2025-04-13 RX ADMIN — MONTELUKAST SODIUM 10 MILLIGRAM(S): 10 TABLET ORAL at 11:15

## 2025-04-13 RX ADMIN — APIXABAN 5 MILLIGRAM(S): 2.5 TABLET, FILM COATED ORAL at 06:42

## 2025-04-13 RX ADMIN — POLYETHYLENE GLYCOL 3350 17 GRAM(S): 17 POWDER, FOR SOLUTION ORAL at 17:11

## 2025-04-13 RX ADMIN — MUPIROCIN CALCIUM 1 APPLICATION(S): 20 CREAM TOPICAL at 06:43

## 2025-04-13 RX ADMIN — CARVEDILOL 6.25 MILLIGRAM(S): 3.12 TABLET, FILM COATED ORAL at 06:42

## 2025-04-13 RX ADMIN — Medication 1 DOSE(S): at 21:13

## 2025-04-13 RX ADMIN — INSULIN LISPRO 1: 100 INJECTION, SOLUTION INTRAVENOUS; SUBCUTANEOUS at 17:02

## 2025-04-13 RX ADMIN — CEFTRIAXONE 100 MILLIGRAM(S): 500 INJECTION, POWDER, FOR SOLUTION INTRAMUSCULAR; INTRAVENOUS at 06:43

## 2025-04-13 RX ADMIN — Medication 100 MILLIGRAM(S): at 06:42

## 2025-04-13 RX ADMIN — Medication 1 APPLICATION(S): at 06:42

## 2025-04-13 RX ADMIN — CARVEDILOL 6.25 MILLIGRAM(S): 3.12 TABLET, FILM COATED ORAL at 17:14

## 2025-04-13 NOTE — PROGRESS NOTE ADULT - ASSESSMENT
Patient is a 89y old  Female with COPD, CHF, AFIB ( on eliquis), DM that comes in for abdominal pain. Patient a poor historian at bedside, unable to reach over the phone to daughter. Patient endoring that today was feeling unwell with weakness, dizziness after physical therapy. Per ED note patient was also noted to be sleepy. On admission, she found to have no fever but CT abd/pelvis shows " Moderate to severe stool distention of the distal sigmoid colon and rectum with slight surrounding stranding. Correlate for constipation/stercoral proctitis." She has started on Ceftriaxone and Flagyl and the ID consult requested to assist with further evaluation and antibiotic management.    # Constipation/stercoral proctitis  # Positive COVID PCR - s/p completed the course of Remdesivir    would recommend:    1. PT/OOB to chair  2. Continue Aggressive bowel regimen to prevent further worsening of stercoral proctitis  3. Continue Ceftriaxone and Flagyl  while inpatient and may change to oral Augmentin 875mg q 12hours on discharge to continue until 4/15/25  4. Aspiration precaution  5. rehab eval

## 2025-04-13 NOTE — PROGRESS NOTE ADULT - ASSESSMENT
89 yo F COPD, CHF, AFIB ( on eliquis), DM that comes in for abdominal paincolitis,COVID+.  1.Colitis-abx.  2.COVID+-Pulm f/u.  3.Afib-eliquis,coreg.  4.DM-Insulin.  5.COPD-MDI.  6.PPI.

## 2025-04-13 NOTE — PROGRESS NOTE ADULT - ASSESSMENT
Patient is a 89y old  Female with COPD, CHF, AFIB ( on eliquis), DM that comes in for abdominal pain. Patient a poor historian at bedside, unable to reach over the phone to daughter. Patient endoring that today was feeling unwell with weakness, dizziness after physical therapy. Per ED note patient was also noted to be sleepy. On admission, she found to have no fever but CT abd/pelvis shows " Moderate to severe stool distention of the distal sigmoid colon and rectum with slight surrounding stranding. Correlate for constipation/stercoral proctitis." She has started on Ceftriaxone and Flagyl and the ID consult requested to assist with further evaluation and antibiotic management.    # Constipation/stercoral proctitis  # Positive COVID PCR - s/p completed the course of Remdesivir    would recommend:    1. OOB to chair  2. Continue Aggressive bowel regimen to prevent further worsening of stercoral proctitis  3. Continue Ceftriaxone and Flagyl  while inpatient and may change to oral Augmentin 875mg q 12hours on discharge to continue until 4/15/25  4. Aspiration precaution      Attending Attestation:    Spent more than 35 minutes on total encounter, more than 50 % of the visit was spent counseling and/or coordinating care by the Attending physician.

## 2025-04-13 NOTE — PROGRESS NOTE ADULT - SUBJECTIVE AND OBJECTIVE BOX
Date of Service 04-13-25 @ 13:04    CHIEF COMPLAINT:Patient is a 89y old  Female who presents with a chief complaint of Colitis .Pt appears comfortable.    	  REVIEW OF SYSTEMS:  CONSTITUTIONAL: No fever, weight loss, or fatigue  EYES: No eye pain, visual disturbances, or discharge  ENT:  No difficulty hearing, tinnitus, vertigo; No sinus or throat pain  NECK: No pain or stiffness  RESPIRATORY: No cough, wheezing, chills or hemoptysis; No Shortness of Breath  CARDIOVASCULAR: No chest pain, palpitations, passing out, dizziness, or leg swelling  GASTROINTESTINAL: No abdominal or epigastric pain. No nausea, vomiting, or hematemesis; No diarrhea or constipation. No melena or hematochezia.  GENITOURINARY: No dysuria, frequency, hematuria, or incontinence  NEUROLOGICAL: No headaches, memory loss, loss of strength, numbness, or tremors  SKIN: No itching, burning, rashes, or lesions   LYMPH Nodes: No enlarged glands  ENDOCRINE: No heat or cold intolerance; No hair loss  MUSCULOSKELETAL: No joint pain or swelling; No muscle, back, or extremity pain  PSYCHIATRIC: No depression, anxiety, mood swings, or difficulty sleeping  HEME/LYMPH: No easy bruising, or bleeding gums  ALLERGY AND IMMUNOLOGIC: No hives or eczema	      PHYSICAL EXAM:  T(C): 36.2 (04-13-25 @ 06:35), Max: 36.8 (04-12-25 @ 18:32)  HR: 69 (04-13-25 @ 06:35) (69 - 98)  BP: 117/71 (04-13-25 @ 06:35) (110/66 - 136/70)  RR: 18 (04-13-25 @ 06:35) (18 - 19)  SpO2: 99% (04-13-25 @ 06:35) (92% - 99%)  Wt(kg): --  I&O's Summary      Appearance: Normal	  HEENT:   Normal oral mucosa, PERRL, EOMI	  Lymphatic: No lymphadenopathy  Cardiovascular: Normal S1 S2, No JVD, No murmurs, No edema  Respiratory: Lungs clear to auscultation	  Psychiatry: A & O x 3, Mood & affect appropriate  Gastrointestinal:  Soft, Non-tender, + BS	  Skin: No rashes, No ecchymoses, No cyanosis	  Neurologic: Non-focal  Extremities: Normal range of motion, No clubbing, cyanosis or edema  Vascular: Peripheral pulses palpable 2+ bilaterally    MEDICATIONS  (STANDING):  apixaban 5 milliGRAM(s) Oral every 12 hours  atorvastatin 10 milliGRAM(s) Oral at bedtime  carvedilol 6.25 milliGRAM(s) Oral every 12 hours  cefTRIAXone   IVPB 1000 milliGRAM(s) IV Intermittent every 24 hours  chlorhexidine 2% Cloths 1 Application(s) Topical <User Schedule>  famotidine    Tablet 20 milliGRAM(s) Oral daily  fluticasone propionate/ salmeterol 100-50 MICROgram(s) Diskus 1 Dose(s) Inhalation two times a day  insulin glargine Injectable (LANTUS) 7 Unit(s) SubCutaneous at bedtime  insulin lispro (ADMELOG) corrective regimen sliding scale   SubCutaneous three times a day before meals  insulin lispro (ADMELOG) corrective regimen sliding scale   SubCutaneous at bedtime  metroNIDAZOLE  IVPB 500 milliGRAM(s) IV Intermittent every 12 hours  montelukast 10 milliGRAM(s) Oral daily  mupirocin 2% Ointment 1 Application(s) Both Nostrils two times a day  polyethylene glycol 3350 17 Gram(s) Oral two times a day  senna 2 Tablet(s) Oral at bedtime  sodium chloride 0.9%. 1000 milliLiter(s) (70 mL/Hr) IV Continuous <Continuous>  tiotropium 2.5 MICROgram(s) Inhaler 2 Puff(s) Inhalation daily    R: 	  	  LABS:	 	                                    13.7   5.04  )-----------( 163      ( 13 Apr 2025 06:17 )             40.3     04-13    137  |  105  |  13  ----------------------------<  118[H]  4.0   |  27  |  0.61    Ca    8.2[L]      13 Apr 2025 06:17      proBNP:   Lipid Profile:   HgA1c:   TSH: Thyroid Stimulating Hormone, Serum: 1.92 uU/mL (03-24 @ 06:16)

## 2025-04-13 NOTE — PROGRESS NOTE ADULT - SUBJECTIVE AND OBJECTIVE BOX
Patient is seen and examined at the bed side, is afebrile. She has multiple bowel movements today.      REVIEW OF SYSTEMS: All other review systems are negative      ALLERGIES : Shrimp (Unknown)  losartan (Angioedema)  Chicken (Stomach Upset)      Vital Signs Last 24 Hrs  T(C): 36.3 (13 Apr 2025 13:22), Max: 36.8 (12 Apr 2025 18:32)  T(F): 97.4 (13 Apr 2025 13:22), Max: 98.3 (12 Apr 2025 18:32)  HR: 72 (13 Apr 2025 13:22) (69 - 98)  BP: 122/70 (13 Apr 2025 13:22) (110/66 - 136/70)  BP(mean): 83 (13 Apr 2025 06:35) (83 - 83)  RR: 17 (13 Apr 2025 13:22) (17 - 19)  SpO2: 98% (13 Apr 2025 13:22) (92% - 99%)    Parameters below as of 13 Apr 2025 13:22  Patient On (Oxygen Delivery Method): room air      PHYSICAL EXAM:  GENERAL: Not in distress   CHEST/LUNG:  Air entry bilaterally  HEART: s1 and s2 present  ABDOMEN:   Nondistended  EXTREMITIES: No pedal  edema  CNS: Awake and alert now      LABS:                         13.7   5.04  )-----------( 163      ( 13 Apr 2025 06:17 )             40.3                           13.7   6.67  )-----------( 206      ( 10 Apr 2025 07:33 )             40.0                         04-13    137  |  105  |  13  ----------------------------<  118[H]  4.0   |  27  |  0.61    Ca    8.2[L]      13 Apr 2025 06:17      04-09    134[L]  |  97  |  21[H]  ----------------------------<  164[H]  3.8   |  28  |  0.78    Ca    8.0[L]      09 Apr 2025 06:35  Phos  2.5     04-09  Mg     2.0     04-09    TPro  5.9[L]  /  Alb  2.7[L]  /  TBili  0.6  /  DBili  x   /  AST  16  /  ALT  29  /  AlkPhos  146[H]  04-08        CAPILLARY BLOOD GLUCOSE  POCT Blood Glucose.: 180 mg/dL (08 Apr 2025 15:53)    ABG - ( 08 Apr 2025 22:05 )  pH, Arterial: 7.48  pH, Blood: x     /  pCO2: 39    /  pO2: 97    / HCO3: 29    / Base Excess: 5.2   /  SaO2: 98            MEDICATIONS  (STANDING):    apixaban 5 milliGRAM(s) Oral every 12 hours  atorvastatin 10 milliGRAM(s) Oral at bedtime  carvedilol 6.25 milliGRAM(s) Oral every 12 hours  cefTRIAXone   IVPB 1000 milliGRAM(s) IV Intermittent every 24 hours  chlorhexidine 2% Cloths 1 Application(s) Topical <User Schedule>  famotidine    Tablet 20 milliGRAM(s) Oral daily  fluticasone propionate/ salmeterol 100-50 MICROgram(s) Diskus 1 Dose(s) Inhalation two times a day  insulin glargine Injectable (LANTUS) 7 Unit(s) SubCutaneous at bedtime  insulin lispro (ADMELOG) corrective regimen sliding scale   SubCutaneous three times a day before meals  insulin lispro (ADMELOG) corrective regimen sliding scale   SubCutaneous at bedtime  metroNIDAZOLE  IVPB 500 milliGRAM(s) IV Intermittent every 12 hours  montelukast 10 milliGRAM(s) Oral daily  mupirocin 2% Ointment 1 Application(s) Both Nostrils two times a day  polyethylene glycol 3350 17 Gram(s) Oral two times a day  senna 2 Tablet(s) Oral at bedtime  sodium chloride 0.9%. 1000 milliLiter(s) (70 mL/Hr) IV Continuous <Continuous>  tiotropium 2.5 MICROgram(s) Inhaler 2 Puff(s) Inhalation daily        RADIOLOGY & ADDITIONAL TESTS:    4/8/25: CT Abdomen and Pelvis No Cont (04.08.25 @ 21:09) Motion limited noncontrast study. Etiology of given clinical symptoms is not clearly elucidated.    Motion limited evaluation of the pancreas. No clear evidence of acute peripancreatic inflammation. Correlate with lipase and clinical exam if   there is concern for pancreatitis.    Moderate to severe stool distention of the distal sigmoid colon and rectum with slight surrounding stranding. Correlate for constipation/stercoral proctitis.    Drop of air within the endometrial canal of unclear significance.    Left arm contrast extravasation. Marked soft tissue edema of the partially imaged left upper extremity on the first set of images.   Recommend clinical correlation to exclude compartment syndrome.    4/4/25: Xray Chest 1 View- PORTABLE-Urgent (Xray Chest 1 View- PORTABLE-Urgent .) (04.04.25 @ 18:35) >  HEART:difficult to access in this projection.  LUNGS: free of consolidation, effusion, or pneumothorax..  BONES: degenerative changes      MICROBIOLOGY DATA:      MRSA/MSSA PCR (04.09.25 @ 07:00)   MRSA PCR Result.: Detected:      Culture - Urine (04.04.25 @ 20:00)   Specimen Source: Clean Catch Clean Catch (Midstream)  Culture Results:   >=3 organisms. Probable collection contamination.    FluA/FluB/RSV/COVID PCR (04.04.25 @ 18:29)   SARS-CoV-2 Result: Detected:

## 2025-04-13 NOTE — PROGRESS NOTE ADULT - SUBJECTIVE AND OBJECTIVE BOX
Patient was seen and examined  Patient is a 89y old  Female who presents with a chief complaint of Colitis (13 Apr 2025 09:51)      INTERVAL HPI/OVERNIGHT EVENTS:  T(C): 36.2 (04-13-25 @ 06:35), Max: 37.1 (04-12-25 @ 12:34)  HR: 69 (04-13-25 @ 06:35) (57 - 98)  BP: 117/71 (04-13-25 @ 06:35) (110/66 - 136/70)  RR: 18 (04-13-25 @ 06:35) (18 - 20)  SpO2: 99% (04-13-25 @ 06:35) (92% - 100%)  Wt(kg): --  I&O's Summary      LABS:                        13.7   5.04  )-----------( 163      ( 13 Apr 2025 06:17 )             40.3     04-13    137  |  105  |  13  ----------------------------<  118[H]  4.0   |  27  |  0.61    Ca    8.2[L]      13 Apr 2025 06:17        Urinalysis Basic - ( 13 Apr 2025 06:17 )    Color: x / Appearance: x / SG: x / pH: x  Gluc: 118 mg/dL / Ketone: x  / Bili: x / Urobili: x   Blood: x / Protein: x / Nitrite: x   Leuk Esterase: x / RBC: x / WBC x   Sq Epi: x / Non Sq Epi: x / Bacteria: x      CAPILLARY BLOOD GLUCOSE      POCT Blood Glucose.: 110 mg/dL (13 Apr 2025 07:47)  POCT Blood Glucose.: 174 mg/dL (12 Apr 2025 21:19)  POCT Blood Glucose.: 142 mg/dL (12 Apr 2025 16:44)  POCT Blood Glucose.: 182 mg/dL (12 Apr 2025 11:45)          Urinalysis Basic - ( 13 Apr 2025 06:17 )    Color: x / Appearance: x / SG: x / pH: x  Gluc: 118 mg/dL / Ketone: x  / Bili: x / Urobili: x   Blood: x / Protein: x / Nitrite: x   Leuk Esterase: x / RBC: x / WBC x   Sq Epi: x / Non Sq Epi: x / Bacteria: x        MEDICATIONS  (STANDING):  apixaban 5 milliGRAM(s) Oral every 12 hours  atorvastatin 10 milliGRAM(s) Oral at bedtime  carvedilol 6.25 milliGRAM(s) Oral every 12 hours  cefTRIAXone   IVPB 1000 milliGRAM(s) IV Intermittent every 24 hours  chlorhexidine 2% Cloths 1 Application(s) Topical <User Schedule>  famotidine    Tablet 20 milliGRAM(s) Oral daily  fluticasone propionate/ salmeterol 100-50 MICROgram(s) Diskus 1 Dose(s) Inhalation two times a day  insulin glargine Injectable (LANTUS) 7 Unit(s) SubCutaneous at bedtime  insulin lispro (ADMELOG) corrective regimen sliding scale   SubCutaneous three times a day before meals  insulin lispro (ADMELOG) corrective regimen sliding scale   SubCutaneous at bedtime  metroNIDAZOLE  IVPB 500 milliGRAM(s) IV Intermittent every 12 hours  montelukast 10 milliGRAM(s) Oral daily  mupirocin 2% Ointment 1 Application(s) Both Nostrils two times a day  polyethylene glycol 3350 17 Gram(s) Oral two times a day  senna 2 Tablet(s) Oral at bedtime  sodium chloride 0.9%. 1000 milliLiter(s) (70 mL/Hr) IV Continuous <Continuous>  tiotropium 2.5 MICROgram(s) Inhaler 2 Puff(s) Inhalation daily    MEDICATIONS  (PRN):  acetaminophen     Tablet .. 650 milliGRAM(s) Oral every 6 hours PRN Temp greater or equal to 38C (100.4F), Mild Pain (1 - 3)  albuterol/ipratropium for Nebulization 3 milliLiter(s) Nebulizer every 6 hours PRN Bronchospasm  ondansetron Injectable 4 milliGRAM(s) IV Push every 8 hours PRN Nausea and/or Vomiting      RADIOLOGY & ADDITIONAL TESTS:    Imaging Personally Reviewed:  [ ] YES  [ ] NO    REVIEW OF SYSTEMS:  CONSTITUTIONAL: No fever, weight loss, or fatigue  EYES: No eye pain, visual disturbances, or discharge  ENMT:  No difficulty hearing, tinnitus, vertigo; No sinus or throat pain  NECK: No pain or stiffness  BREASTS: No pain, masses, or nipple discharge  RESPIRATORY: No cough, wheezing, chills or hemoptysis; No shortness of breath  CARDIOVASCULAR: No chest pain, palpitations, dizziness, or leg swelling  GASTROINTESTINAL: No abdominal or epigastric pain. No nausea, vomiting, or hematemesis; No diarrhea or constipation. No melena or hematochezia.  GENITOURINARY: No dysuria, frequency, hematuria, or incontinence  NEUROLOGICAL: No headaches, memory loss, loss of strength, numbness, or tremors  SKIN: No itching, burning, rashes, or lesions   LYMPH NODES: No enlarged glands  ENDOCRINE: No heat or cold intolerance; No hair loss  MUSCULOSKELETAL: No joint pain or swelling; No muscle, back, or extremity pain  PSYCHIATRIC: No depression, anxiety, mood swings, or difficulty sleeping  HEME/LYMPH: No easy bruising, or bleeding gums  ALLERY AND IMMUNOLOGIC: No hives or eczema      Consultant(s) Notes Reviewed:  [ x ] YES  [ ] NO    PHYSICAL EXAM:  GENERAL: NAD, well-groomed, well-developed  HEAD:  Atraumatic, Normocephalic  EYES: EOMI, PERRLA, conjunctiva and sclera clear  ENMT: No tonsillar erythema, exudates, or enlargement; Moist mucous membranes, Good dentition, No lesions  NECK: Supple, No JVD, Normal thyroid  NERVOUS SYSTEM:  Alert & Oriented X3, Good concentration; Motor Strength 5/5 B/L upper and lower extremities; DTRs 2+ intact and symmetric  CHEST/LUNG: Clear to percussion bilaterally; No rales, rhonchi, wheezing, or rubs  HEART: Regular rate and rhythm; No murmurs, rubs, or gallops  ABDOMEN: Soft, Nontender, Nondistended; Bowel sounds present  EXTREMITIES:  2+ Peripheral Pulses, No clubbing, cyanosis, or edema  LYMPH: No lymphadenopathy noted  SKIN: No rashes or lesions    Care Discussed with Consultants/Other Providers [ x] YES  [ ] NO

## 2025-04-13 NOTE — PROGRESS NOTE ADULT - SUBJECTIVE AND OBJECTIVE BOX
Patient is a 89y old  Female who presents with a chief complaint of Colitis (12 Apr 2025 14:26)  Awake, alert, lying in bed in NAD. Doing well on RA    INTERVAL HPI/OVERNIGHT EVENTS:      VITAL SIGNS:  T(F): 97.1 (04-13-25 @ 06:35)  HR: 69 (04-13-25 @ 06:35)  BP: 117/71 (04-13-25 @ 06:35)  RR: 18 (04-13-25 @ 06:35)  SpO2: 99% (04-13-25 @ 06:35)  Wt(kg): --  I&O's Detail          REVIEW OF SYSTEMS:    CONSTITUTIONAL:  No fevers, chills, sweats    HEENT:  Eyes:  No diplopia or blurred vision. ENT:  No earache, sore throat or runny nose.    CARDIOVASCULAR:  No pressure, squeezing, tightness, or heaviness about the chest; no palpitations.    RESPIRATORY:  Per HPI    GASTROINTESTINAL:  No abdominal pain, nausea, vomiting or diarrhea.    GENITOURINARY:  No dysuria, frequency or urgency.    NEUROLOGIC:  No paresthesias, fasciculations, seizures or weakness.    PSYCHIATRIC:  No disorder of thought or mood.      PHYSICAL EXAM:    Constitutional: Well developed and nourished  Eyes:Perrla  ENMT: normal  Neck:supple  Respiratory: good air entry  Cardiovascular: S1 S2 regular  Gastrointestinal: Soft, Non tender  Extremities: No edema  Vascular:normal  Neurological:Awake, alert,Ox3  Musculoskeletal:Normal      MEDICATIONS  (STANDING):  apixaban 5 milliGRAM(s) Oral every 12 hours  atorvastatin 10 milliGRAM(s) Oral at bedtime  carvedilol 6.25 milliGRAM(s) Oral every 12 hours  cefTRIAXone   IVPB 1000 milliGRAM(s) IV Intermittent every 24 hours  chlorhexidine 2% Cloths 1 Application(s) Topical <User Schedule>  famotidine    Tablet 20 milliGRAM(s) Oral daily  fluticasone propionate/ salmeterol 100-50 MICROgram(s) Diskus 1 Dose(s) Inhalation two times a day  insulin glargine Injectable (LANTUS) 7 Unit(s) SubCutaneous at bedtime  insulin lispro (ADMELOG) corrective regimen sliding scale   SubCutaneous three times a day before meals  insulin lispro (ADMELOG) corrective regimen sliding scale   SubCutaneous at bedtime  metroNIDAZOLE  IVPB 500 milliGRAM(s) IV Intermittent every 12 hours  montelukast 10 milliGRAM(s) Oral daily  mupirocin 2% Ointment 1 Application(s) Both Nostrils two times a day  polyethylene glycol 3350 17 Gram(s) Oral two times a day  senna 2 Tablet(s) Oral at bedtime  sodium chloride 0.9%. 1000 milliLiter(s) (70 mL/Hr) IV Continuous <Continuous>  tiotropium 2.5 MICROgram(s) Inhaler 2 Puff(s) Inhalation daily    MEDICATIONS  (PRN):  acetaminophen     Tablet .. 650 milliGRAM(s) Oral every 6 hours PRN Temp greater or equal to 38C (100.4F), Mild Pain (1 - 3)  albuterol/ipratropium for Nebulization 3 milliLiter(s) Nebulizer every 6 hours PRN Bronchospasm  ondansetron Injectable 4 milliGRAM(s) IV Push every 8 hours PRN Nausea and/or Vomiting      Allergies    Shrimp (Unknown)  losartan (Angioedema)    Intolerances    Chicken (Stomach Upset)      LABS:                        13.7   5.04  )-----------( 163      ( 13 Apr 2025 06:17 )             40.3     04-13    137  |  105  |  13  ----------------------------<  118[H]  4.0   |  27  |  0.61    Ca    8.2[L]      13 Apr 2025 06:17        Urinalysis Basic - ( 13 Apr 2025 06:17 )    Color: x / Appearance: x / SG: x / pH: x  Gluc: 118 mg/dL / Ketone: x  / Bili: x / Urobili: x   Blood: x / Protein: x / Nitrite: x   Leuk Esterase: x / RBC: x / WBC x   Sq Epi: x / Non Sq Epi: x / Bacteria: x            CAPILLARY BLOOD GLUCOSE      POCT Blood Glucose.: 110 mg/dL (13 Apr 2025 07:47)  POCT Blood Glucose.: 174 mg/dL (12 Apr 2025 21:19)  POCT Blood Glucose.: 142 mg/dL (12 Apr 2025 16:44)  POCT Blood Glucose.: 182 mg/dL (12 Apr 2025 11:45)        RADIOLOGY & ADDITIONAL TESTS:    CXR:    Ct scan chest:    ekg;    echo:

## 2025-04-14 ENCOUNTER — TRANSCRIPTION ENCOUNTER (OUTPATIENT)
Age: 89
End: 2025-04-14

## 2025-04-14 LAB
GLUCOSE BLDC GLUCOMTR-MCNC: 103 MG/DL — HIGH (ref 70–99)
GLUCOSE BLDC GLUCOMTR-MCNC: 113 MG/DL — HIGH (ref 70–99)
GLUCOSE BLDC GLUCOMTR-MCNC: 156 MG/DL — HIGH (ref 70–99)
GLUCOSE BLDC GLUCOMTR-MCNC: 217 MG/DL — HIGH (ref 70–99)

## 2025-04-14 RX ORDER — POLYETHYLENE GLYCOL 3350 17 G/17G
17 POWDER, FOR SOLUTION ORAL
Qty: 0 | Refills: 0 | DISCHARGE
Start: 2025-04-14

## 2025-04-14 RX ORDER — NYSTATIN 100000 [USP'U]/G
1 CREAM TOPICAL
Refills: 0 | Status: DISCONTINUED | OUTPATIENT
Start: 2025-04-14 | End: 2025-04-15

## 2025-04-14 RX ADMIN — Medication 100 MILLIGRAM(S): at 17:09

## 2025-04-14 RX ADMIN — NYSTATIN 1 APPLICATION(S): 100000 CREAM TOPICAL at 17:10

## 2025-04-14 RX ADMIN — APIXABAN 5 MILLIGRAM(S): 2.5 TABLET, FILM COATED ORAL at 06:05

## 2025-04-14 RX ADMIN — CARVEDILOL 6.25 MILLIGRAM(S): 3.12 TABLET, FILM COATED ORAL at 06:18

## 2025-04-14 RX ADMIN — Medication 2 TABLET(S): at 21:59

## 2025-04-14 RX ADMIN — INSULIN GLARGINE-YFGN 7 UNIT(S): 100 INJECTION, SOLUTION SUBCUTANEOUS at 22:00

## 2025-04-14 RX ADMIN — Medication 1 APPLICATION(S): at 06:02

## 2025-04-14 RX ADMIN — Medication 100 MILLIGRAM(S): at 06:56

## 2025-04-14 RX ADMIN — TIOTROPIUM BROMIDE INHALATION SPRAY 2 PUFF(S): 3.12 SPRAY, METERED RESPIRATORY (INHALATION) at 12:18

## 2025-04-14 RX ADMIN — POLYETHYLENE GLYCOL 3350 17 GRAM(S): 17 POWDER, FOR SOLUTION ORAL at 17:09

## 2025-04-14 RX ADMIN — INSULIN LISPRO 2: 100 INJECTION, SOLUTION INTRAVENOUS; SUBCUTANEOUS at 12:04

## 2025-04-14 RX ADMIN — ATORVASTATIN CALCIUM 10 MILLIGRAM(S): 80 TABLET, FILM COATED ORAL at 22:25

## 2025-04-14 RX ADMIN — CEFTRIAXONE 100 MILLIGRAM(S): 500 INJECTION, POWDER, FOR SOLUTION INTRAMUSCULAR; INTRAVENOUS at 06:03

## 2025-04-14 RX ADMIN — Medication 1 DOSE(S): at 22:05

## 2025-04-14 RX ADMIN — MUPIROCIN CALCIUM 1 APPLICATION(S): 20 CREAM TOPICAL at 06:02

## 2025-04-14 RX ADMIN — Medication 1 DOSE(S): at 12:05

## 2025-04-14 RX ADMIN — APIXABAN 5 MILLIGRAM(S): 2.5 TABLET, FILM COATED ORAL at 17:09

## 2025-04-14 RX ADMIN — MONTELUKAST SODIUM 10 MILLIGRAM(S): 10 TABLET ORAL at 12:05

## 2025-04-14 RX ADMIN — Medication 20 MILLIGRAM(S): at 12:06

## 2025-04-14 NOTE — PROGRESS NOTE ADULT - SUBJECTIVE AND OBJECTIVE BOX
Date of Service 04-14-25 @ 12:38    CHIEF COMPLAINT:Patient is a 89y old  Female who presents with a chief complaint of Colitis .Pt appears comfortable.    	  REVIEW OF SYSTEMS:  CONSTITUTIONAL: No fever, weight loss, or fatigue  EYES: No eye pain, visual disturbances, or discharge  ENT:  No difficulty hearing, tinnitus, vertigo; No sinus or throat pain  NECK: No pain or stiffness  RESPIRATORY: No cough, wheezing, chills or hemoptysis; No Shortness of Breath  CARDIOVASCULAR: No chest pain, palpitations, passing out, dizziness, or leg swelling  GASTROINTESTINAL: No abdominal or epigastric pain. No nausea, vomiting, or hematemesis; No diarrhea or constipation. No melena or hematochezia.  GENITOURINARY: No dysuria, frequency, hematuria, or incontinence  NEUROLOGICAL: No headaches, memory loss, loss of strength, numbness, or tremors  SKIN: No itching, burning, rashes, or lesions   LYMPH Nodes: No enlarged glands  ENDOCRINE: No heat or cold intolerance; No hair loss  MUSCULOSKELETAL: No joint pain or swelling; No muscle, back, or extremity pain  PSYCHIATRIC: No depression, anxiety, mood swings, or difficulty sleeping  HEME/LYMPH: No easy bruising, or bleeding gums  ALLERGY AND IMMUNOLOGIC: No hives or eczema	        PHYSICAL EXAM:  T(C): 36.4 (04-14-25 @ 04:55), Max: 37.6 (04-13-25 @ 21:29)  HR: 77 (04-14-25 @ 08:35) (72 - 77)  BP: 107/79 (04-14-25 @ 04:55) (107/79 - 122/70)  RR: 20 (04-14-25 @ 04:55) (17 - 20)  SpO2: 99% (04-14-25 @ 08:35) (97% - 100%)  Wt(kg): --  I&O's Summary    14 Apr 2025 07:01  -  14 Apr 2025 12:38  --------------------------------------------------------  IN: 0 mL / OUT: 600 mL / NET: -600 mL        Appearance: Normal	  HEENT:   Normal oral mucosa, PERRL, EOMI	  Lymphatic: No lymphadenopathy  Cardiovascular: Normal S1 S2, No JVD, No murmurs, No edema  Respiratory: Lungs clear to auscultation	  Psychiatry: A & O x 3, Mood & affect appropriate  Gastrointestinal:  Soft, Non-tender, + BS	  Skin: No rashes, No ecchymoses, No cyanosis	  Neurologic: Non-focal  Extremities: Normal range of motion, No clubbing, cyanosis or edema  Vascular: Peripheral pulses palpable 2+ bilaterally    MEDICATIONS  (STANDING):  apixaban 5 milliGRAM(s) Oral every 12 hours  atorvastatin 10 milliGRAM(s) Oral at bedtime  carvedilol 6.25 milliGRAM(s) Oral every 12 hours  cefTRIAXone   IVPB 1000 milliGRAM(s) IV Intermittent every 24 hours  chlorhexidine 2% Cloths 1 Application(s) Topical <User Schedule>  famotidine    Tablet 20 milliGRAM(s) Oral daily  fluticasone propionate/ salmeterol 100-50 MICROgram(s) Diskus 1 Dose(s) Inhalation two times a day  insulin glargine Injectable (LANTUS) 7 Unit(s) SubCutaneous at bedtime  insulin lispro (ADMELOG) corrective regimen sliding scale   SubCutaneous three times a day before meals  insulin lispro (ADMELOG) corrective regimen sliding scale   SubCutaneous at bedtime  metroNIDAZOLE  IVPB 500 milliGRAM(s) IV Intermittent every 12 hours  montelukast 10 milliGRAM(s) Oral daily  nystatin Powder 1 Application(s) Topical two times a day  polyethylene glycol 3350 17 Gram(s) Oral two times a day  senna 2 Tablet(s) Oral at bedtime  sodium chloride 0.9%. 1000 milliLiter(s) (70 mL/Hr) IV Continuous <Continuous>  tiotropium 2.5 MICROgram(s) Inhaler 2 Puff(s) Inhalation daily      	  	  LABS:	 	                      13.7   5.04  )-----------( 163      ( 13 Apr 2025 06:17 )             40.3     04-13    137  |  105  |  13  ----------------------------<  118[H]  4.0   |  27  |  0.61    Ca    8.2[L]      13 Apr 2025 06:17      TSH: Thyroid Stimulating Hormone, Serum: 1.92 uU/mL (03-24 @ 06:16)

## 2025-04-14 NOTE — PROGRESS NOTE ADULT - ASSESSMENT
Patient is a 89y old  Female with COPD, CHF, AFIB ( on eliquis), DM that comes in for abdominal pain. Patient a poor historian at bedside, unable to reach over the phone to daughter. Patient endoring that today was feeling unwell with weakness, dizziness after physical therapy. Per ED note patient was also noted to be sleepy. On admission, she found to have no fever but CT abd/pelvis shows " Moderate to severe stool distention of the distal sigmoid colon and rectum with slight surrounding stranding. Correlate for constipation/stercoral proctitis." She has started on Ceftriaxone and Flagyl and the ID consult requested to assist with further evaluation and antibiotic management.    # Constipation/stercoral proctitis  # Positive COVID PCR - s/p completed the course of Remdesivir    would recommend:    1. PT/OOB to chair  2. Continue Ceftriaxone and Flagyl  while inpatient and may change to oral Augmentin 875mg q 12hours on discharge to continue until 4/15/25  3. Continue Aggressive bowel regimen to prevent further worsening of stercoral proctitis   4. Aspiration precaution      Attending Attestation:    Spent more than 35 minutes on total encounter, more than 50 % of the visit was spent counseling and/or coordinating care by the Attending physician.   	  Patient is a 89y old  Female with COPD, CHF, AFIB ( on eliquis), DM that comes in for abdominal pain. Patient a poor historian at bedside, unable to reach over the phone to daughter. Patient endoring that today was feeling unwell with weakness, dizziness after physical therapy. Per ED note patient was also noted to be sleepy. On admission, she found to have no fever but CT abd/pelvis shows " Moderate to severe stool distention of the distal sigmoid colon and rectum with slight surrounding stranding. Correlate for constipation/stercoral proctitis." She has started on Ceftriaxone and Flagyl and the ID consult requested to assist with further evaluation and antibiotic management.    # Constipation/stercoral proctitis  # Positive COVID PCR - s/p completed the course of Remdesivir    would recommend:    1. PT/OOB to chair  2. Continue Ceftriaxone and Flagyl  while inpatient and may change to oral Augmentin 875mg q 12hours on discharge to continue until 4/15/25  3. Continue Aggressive bowel regimen to prevent further worsening of stercoral proctitis   4. Aspiration precaution    d/w Covering Lucia ROD     Attending Attestation:    Spent more than 35 minutes on total encounter, more than 50 % of the visit was spent counseling and/or coordinating care by the Attending physician.

## 2025-04-14 NOTE — PROGRESS NOTE ADULT - SUBJECTIVE AND OBJECTIVE BOX
Patient is seen and examined at the bed side, is afebrile. She has multiple bowel movements today.      REVIEW OF SYSTEMS: All other review systems are negative      ALLERGIES : Shrimp (Unknown)  losartan (Angioedema)  Chicken (Stomach Upset)      Vital Signs Last 24 Hrs  T(C): 36.4 (14 Apr 2025 04:55), Max: 37.6 (13 Apr 2025 21:29)  T(F): 97.6 (14 Apr 2025 04:55), Max: 99.6 (13 Apr 2025 21:29)  HR: 77 (14 Apr 2025 08:35) (72 - 77)  BP: 107/79 (14 Apr 2025 04:55) (107/79 - 122/70)  BP(mean): 86 (14 Apr 2025 04:55) (86 - 86)  RR: 20 (14 Apr 2025 04:55) (17 - 20)  SpO2: 99% (14 Apr 2025 08:35) (97% - 100%)    Parameters below as of 14 Apr 2025 04:55  Patient On (Oxygen Delivery Method): room air        PHYSICAL EXAM:  GENERAL: Not in distress   CHEST/LUNG:  Air entry bilaterally  HEART: s1 and s2 present  ABDOMEN:   Nondistended  EXTREMITIES: No pedal  edema  CNS: Awake and alert now      LABS: No new Labs                           13.7   5.04  )-----------( 163      ( 13 Apr 2025 06:17 )             40.3                           13.7   6.67  )-----------( 206      ( 10 Apr 2025 07:33 )             40.0                         04-13    137  |  105  |  13  ----------------------------<  118[H]  4.0   |  27  |  0.61    Ca    8.2[L]      13 Apr 2025 06:17      04-09    134[L]  |  97  |  21[H]  ----------------------------<  164[H]  3.8   |  28  |  0.78    Ca    8.0[L]      09 Apr 2025 06:35  Phos  2.5     04-09  Mg     2.0     04-09    TPro  5.9[L]  /  Alb  2.7[L]  /  TBili  0.6  /  DBili  x   /  AST  16  /  ALT  29  /  AlkPhos  146[H]  04-08        CAPILLARY BLOOD GLUCOSE  POCT Blood Glucose.: 180 mg/dL (08 Apr 2025 15:53)    ABG - ( 08 Apr 2025 22:05 )  pH, Arterial: 7.48  pH, Blood: x     /  pCO2: 39    /  pO2: 97    / HCO3: 29    / Base Excess: 5.2   /  SaO2: 98            MEDICATIONS  (STANDING):    apixaban 5 milliGRAM(s) Oral every 12 hours  atorvastatin 10 milliGRAM(s) Oral at bedtime  carvedilol 6.25 milliGRAM(s) Oral every 12 hours  cefTRIAXone   IVPB 1000 milliGRAM(s) IV Intermittent every 24 hours  chlorhexidine 2% Cloths 1 Application(s) Topical <User Schedule>  famotidine    Tablet 20 milliGRAM(s) Oral daily  fluticasone propionate/ salmeterol 100-50 MICROgram(s) Diskus 1 Dose(s) Inhalation two times a day  insulin glargine Injectable (LANTUS) 7 Unit(s) SubCutaneous at bedtime  insulin lispro (ADMELOG) corrective regimen sliding scale   SubCutaneous three times a day before meals  insulin lispro (ADMELOG) corrective regimen sliding scale   SubCutaneous at bedtime  metroNIDAZOLE  IVPB 500 milliGRAM(s) IV Intermittent every 12 hours  montelukast 10 milliGRAM(s) Oral daily  nystatin Powder 1 Application(s) Topical two times a day  polyethylene glycol 3350 17 Gram(s) Oral two times a day  senna 2 Tablet(s) Oral at bedtime  sodium chloride 0.9%. 1000 milliLiter(s) (70 mL/Hr) IV Continuous <Continuous>  tiotropium 2.5 MICROgram(s) Inhaler 2 Puff(s) Inhalation daily        RADIOLOGY & ADDITIONAL TESTS:    4/8/25: CT Abdomen and Pelvis No Cont (04.08.25 @ 21:09) Motion limited noncontrast study. Etiology of given clinical symptoms is not clearly elucidated.    Motion limited evaluation of the pancreas. No clear evidence of acute peripancreatic inflammation. Correlate with lipase and clinical exam if   there is concern for pancreatitis.    Moderate to severe stool distention of the distal sigmoid colon and rectum with slight surrounding stranding. Correlate for constipation/stercoral proctitis.    Drop of air within the endometrial canal of unclear significance.    Left arm contrast extravasation. Marked soft tissue edema of the partially imaged left upper extremity on the first set of images.   Recommend clinical correlation to exclude compartment syndrome.    4/4/25: Xray Chest 1 View- PORTABLE-Urgent (Xray Chest 1 View- PORTABLE-Urgent .) (04.04.25 @ 18:35) >  HEART:difficult to access in this projection.  LUNGS: free of consolidation, effusion, or pneumothorax..  BONES: degenerative changes      MICROBIOLOGY DATA:      MRSA/MSSA PCR (04.09.25 @ 07:00)   MRSA PCR Result.: Detected:      Culture - Urine (04.04.25 @ 20:00)   Specimen Source: Clean Catch Clean Catch (Midstream)  Culture Results:   >=3 organisms. Probable collection contamination.    FluA/FluB/RSV/COVID PCR (04.04.25 @ 18:29)   SARS-CoV-2 Result: Detected:               Patient is seen and examined at the bed side, is afebrile. Awaiting for PT evaluation.      REVIEW OF SYSTEMS: All other review systems are negative      ALLERGIES : Shrimp (Unknown)  losartan (Angioedema)  Chicken (Stomach Upset)      Vital Signs Last 24 Hrs  T(C): 36.4 (14 Apr 2025 04:55), Max: 37.6 (13 Apr 2025 21:29)  T(F): 97.6 (14 Apr 2025 04:55), Max: 99.6 (13 Apr 2025 21:29)  HR: 77 (14 Apr 2025 08:35) (72 - 77)  BP: 107/79 (14 Apr 2025 04:55) (107/79 - 122/70)  BP(mean): 86 (14 Apr 2025 04:55) (86 - 86)  RR: 20 (14 Apr 2025 04:55) (17 - 20)  SpO2: 99% (14 Apr 2025 08:35) (97% - 100%)    Parameters below as of 14 Apr 2025 04:55  Patient On (Oxygen Delivery Method): room air        PHYSICAL EXAM:  GENERAL: Not in distress   CHEST/LUNG:  Air entry bilaterally  HEART: s1 and s2 present  ABDOMEN:   Nondistended  EXTREMITIES: No pedal  edema  CNS: Awake and alert now      LABS: No new Labs                           13.7   5.04  )-----------( 163      ( 13 Apr 2025 06:17 )             40.3                           13.7   6.67  )-----------( 206      ( 10 Apr 2025 07:33 )             40.0                         04-13    137  |  105  |  13  ----------------------------<  118[H]  4.0   |  27  |  0.61    Ca    8.2[L]      13 Apr 2025 06:17      04-09    134[L]  |  97  |  21[H]  ----------------------------<  164[H]  3.8   |  28  |  0.78    Ca    8.0[L]      09 Apr 2025 06:35  Phos  2.5     04-09  Mg     2.0     04-09    TPro  5.9[L]  /  Alb  2.7[L]  /  TBili  0.6  /  DBili  x   /  AST  16  /  ALT  29  /  AlkPhos  146[H]  04-08        CAPILLARY BLOOD GLUCOSE  POCT Blood Glucose.: 180 mg/dL (08 Apr 2025 15:53)    ABG - ( 08 Apr 2025 22:05 )  pH, Arterial: 7.48  pH, Blood: x     /  pCO2: 39    /  pO2: 97    / HCO3: 29    / Base Excess: 5.2   /  SaO2: 98            MEDICATIONS  (STANDING):    apixaban 5 milliGRAM(s) Oral every 12 hours  atorvastatin 10 milliGRAM(s) Oral at bedtime  carvedilol 6.25 milliGRAM(s) Oral every 12 hours  cefTRIAXone   IVPB 1000 milliGRAM(s) IV Intermittent every 24 hours  chlorhexidine 2% Cloths 1 Application(s) Topical <User Schedule>  famotidine    Tablet 20 milliGRAM(s) Oral daily  fluticasone propionate/ salmeterol 100-50 MICROgram(s) Diskus 1 Dose(s) Inhalation two times a day  insulin glargine Injectable (LANTUS) 7 Unit(s) SubCutaneous at bedtime  insulin lispro (ADMELOG) corrective regimen sliding scale   SubCutaneous three times a day before meals  insulin lispro (ADMELOG) corrective regimen sliding scale   SubCutaneous at bedtime  metroNIDAZOLE  IVPB 500 milliGRAM(s) IV Intermittent every 12 hours  montelukast 10 milliGRAM(s) Oral daily  nystatin Powder 1 Application(s) Topical two times a day  polyethylene glycol 3350 17 Gram(s) Oral two times a day  senna 2 Tablet(s) Oral at bedtime  sodium chloride 0.9%. 1000 milliLiter(s) (70 mL/Hr) IV Continuous <Continuous>  tiotropium 2.5 MICROgram(s) Inhaler 2 Puff(s) Inhalation daily        RADIOLOGY & ADDITIONAL TESTS:    4/8/25: CT Abdomen and Pelvis No Cont (04.08.25 @ 21:09) Motion limited noncontrast study. Etiology of given clinical symptoms is not clearly elucidated.    Motion limited evaluation of the pancreas. No clear evidence of acute peripancreatic inflammation. Correlate with lipase and clinical exam if   there is concern for pancreatitis.    Moderate to severe stool distention of the distal sigmoid colon and rectum with slight surrounding stranding. Correlate for constipation/stercoral proctitis.    Drop of air within the endometrial canal of unclear significance.    Left arm contrast extravasation. Marked soft tissue edema of the partially imaged left upper extremity on the first set of images.   Recommend clinical correlation to exclude compartment syndrome.    4/4/25: Xray Chest 1 View- PORTABLE-Urgent (Xray Chest 1 View- PORTABLE-Urgent .) (04.04.25 @ 18:35) >  HEART:difficult to access in this projection.  LUNGS: free of consolidation, effusion, or pneumothorax..  BONES: degenerative changes      MICROBIOLOGY DATA:      MRSA/MSSA PCR (04.09.25 @ 07:00)   MRSA PCR Result.: Detected:      Culture - Urine (04.04.25 @ 20:00)   Specimen Source: Clean Catch Clean Catch (Midstream)  Culture Results:   >=3 organisms. Probable collection contamination.    FluA/FluB/RSV/COVID PCR (04.04.25 @ 18:29)   SARS-CoV-2 Result: Detected:

## 2025-04-14 NOTE — DISCHARGE NOTE PROVIDER - PROVIDER TOKENS
PROVIDER:[TOKEN:[[id number]],FOLLOWUP:[1 week],ESTABLISHEDPATIENT:[T]],PROVIDER:[TOKEN:[[id number]],FOLLOWUP:[1 week],ESTABLISHEDPATIENT:[T]]

## 2025-04-14 NOTE — PROGRESS NOTE ADULT - PROBLEM SELECTOR PLAN 1
Stool Exam  Antibiotics  GI eval  ID follow up  CT abdomen and Pelvis Stool Exam  Antibiotics  GI eval  ID follow up  CT abdomen and Pelvis noted

## 2025-04-14 NOTE — DISCHARGE NOTE PROVIDER - NSDCMRMEDTOKEN_GEN_ALL_CORE_FT
albuterol 90 mcg/inh inhalation aerosol: 2 puff(s) inhaled every 6 hours as needed for  bronchospasm  anastrozole 1 mg oral tablet: 1 tab(s) orally once a day  atorvastatin 10 mg oral tablet: 1 tab(s) orally once a day (at bedtime)  carvedilol 6.25 mg oral tablet: 1 tab(s) orally 2 times a day  Eliquis 5 mg oral tablet: 1 tab(s) orally 2 times a day  famotidine 20 mg oral tablet: 1 tab(s) orally 2 times a day  Lantus 100 units/mL subcutaneous solution: 10 unit(s) subcutaneous once a day (at bedtime)  Lasix 40 mg oral tablet: 1 tab(s) orally once a day  MetFORMIN (Eqv-Glumetza) 1000 mg oral tablet, extended release: 1 tab(s) orally 2 times a day  polyethylene glycol 3350 oral powder for reconstitution: 17 gram(s) orally 2 times a day  Singulair 10 mg oral tablet: 1 tab(s) orally once a day  Trelegy Ellipta 100 mcg-62.5 mcg-25 mcg/inh inhalation powder: 1 puff(s) inhaled once a day

## 2025-04-14 NOTE — PROGRESS NOTE ADULT - SUBJECTIVE AND OBJECTIVE BOX
NP Note discussed with  Primary Attending    Patient is a 89y old  Female who presents with a chief complaint of Colitis (14 Apr 2025 08:21)      INTERVAL HPI/OVERNIGHT EVENTS: no new complaints    MEDICATIONS  (STANDING):  apixaban 5 milliGRAM(s) Oral every 12 hours  atorvastatin 10 milliGRAM(s) Oral at bedtime  carvedilol 6.25 milliGRAM(s) Oral every 12 hours  cefTRIAXone   IVPB 1000 milliGRAM(s) IV Intermittent every 24 hours  chlorhexidine 2% Cloths 1 Application(s) Topical <User Schedule>  famotidine    Tablet 20 milliGRAM(s) Oral daily  fluticasone propionate/ salmeterol 100-50 MICROgram(s) Diskus 1 Dose(s) Inhalation two times a day  insulin glargine Injectable (LANTUS) 7 Unit(s) SubCutaneous at bedtime  insulin lispro (ADMELOG) corrective regimen sliding scale   SubCutaneous three times a day before meals  insulin lispro (ADMELOG) corrective regimen sliding scale   SubCutaneous at bedtime  metroNIDAZOLE  IVPB 500 milliGRAM(s) IV Intermittent every 12 hours  montelukast 10 milliGRAM(s) Oral daily  nystatin Powder 1 Application(s) Topical two times a day  polyethylene glycol 3350 17 Gram(s) Oral two times a day  senna 2 Tablet(s) Oral at bedtime  sodium chloride 0.9%. 1000 milliLiter(s) (70 mL/Hr) IV Continuous <Continuous>  tiotropium 2.5 MICROgram(s) Inhaler 2 Puff(s) Inhalation daily    MEDICATIONS  (PRN):  acetaminophen     Tablet .. 650 milliGRAM(s) Oral every 6 hours PRN Temp greater or equal to 38C (100.4F), Mild Pain (1 - 3)  albuterol/ipratropium for Nebulization 3 milliLiter(s) Nebulizer every 6 hours PRN Bronchospasm  ondansetron Injectable 4 milliGRAM(s) IV Push every 8 hours PRN Nausea and/or Vomiting      __________________________________________________  REVIEW OF SYSTEMS:    CONSTITUTIONAL: No fever,   EYES: no acute visual disturbances  NECK: No pain or stiffness  RESPIRATORY: No cough; No shortness of breath  CARDIOVASCULAR: No chest pain, no palpitations  GASTROINTESTINAL: No pain. No nausea or vomiting; No diarrhea   NEUROLOGICAL: No headache or numbness, no tremors  MUSCULOSKELETAL: No joint pain, no muscle pain  GENITOURINARY: no dysuria, no frequency, no hesitancy  PSYCHIATRY: no depression , no anxiety  ALL OTHER  ROS negative        Vital Signs Last 24 Hrs  T(C): 36.4 (14 Apr 2025 04:55), Max: 37.6 (13 Apr 2025 21:29)  T(F): 97.6 (14 Apr 2025 04:55), Max: 99.6 (13 Apr 2025 21:29)  HR: 77 (14 Apr 2025 08:35) (72 - 77)  BP: 107/79 (14 Apr 2025 04:55) (107/79 - 122/70)  BP(mean): 86 (14 Apr 2025 04:55) (86 - 86)  RR: 20 (14 Apr 2025 04:55) (17 - 20)  SpO2: 99% (14 Apr 2025 08:35) (97% - 100%)    Parameters below as of 14 Apr 2025 04:55  Patient On (Oxygen Delivery Method): room air        ________________________________________________  PHYSICAL EXAM:  GENERAL: NAD  HEENT: Normocephalic;  conjunctivae and sclerae clear  NECK : supple, No JVD  CHEST/LUNG: Diminished breath sounds bilateral bases.  HEART: S1 S2  regular; no murmurs, gallops or rubs  ABDOMEN: Soft, Nontender, Nondistended; Bowel sounds present  EXTREMITIES: no cyanosis; no edema; no calf tenderness  SKIN: warm and dry; no rash  NERVOUS SYSTEM:  Awake and alert; Oriented  to place, person and time ; no new deficits    _________________________________________________  LABS:                        13.7   5.04  )-----------( 163      ( 13 Apr 2025 06:17 )             40.3     04-13    137  |  105  |  13  ----------------------------<  118[H]  4.0   |  27  |  0.61    Ca    8.2[L]      13 Apr 2025 06:17        Urinalysis Basic - ( 13 Apr 2025 06:17 )    Color: x / Appearance: x / SG: x / pH: x  Gluc: 118 mg/dL / Ketone: x  / Bili: x / Urobili: x   Blood: x / Protein: x / Nitrite: x   Leuk Esterase: x / RBC: x / WBC x   Sq Epi: x / Non Sq Epi: x / Bacteria: x      CAPILLARY BLOOD GLUCOSE      POCT Blood Glucose.: 217 mg/dL (14 Apr 2025 11:56)  POCT Blood Glucose.: 103 mg/dL (14 Apr 2025 07:55)  POCT Blood Glucose.: 128 mg/dL (13 Apr 2025 21:09)  POCT Blood Glucose.: 193 mg/dL (13 Apr 2025 16:56)        RADIOLOGY & ADDITIONAL TESTS:    Imaging Personally Reviewed:  YES  < from: Xray Chest 1 View- PORTABLE-Urgent (Xray Chest 1 View- PORTABLE-Urgent .) (04.04.25 @ 18:35) >  FINDINGS/  IMPRESSION:    HEART:difficult to access in this projection.  LUNGS: free of consolidation,effusion, or pneumothorax..  BONES: degenerative changes    --- End of Report ---    < end of copied text >  < from: CT Abdomen and Pelvis No Cont (04.08.25 @ 21:09) >  COMPARISON: CT abdomen pelvis 9/13/2019    CONTRAST/COMPLICATIONS:  IV Contrast: Omnipaque 350 (accession [id number]), NONE (accession   [id number])  90 cc administered   10 cc discarded  Oral Contrast: NONE  Complication: Extravasation (accession [id number]), . (accession [id number])    PROCEDURE:  CT of the Abdomen and Pelvis was performed.  Sagittal and coronal reformats were performed.  Scan was repeated due to incomplete imaging of the pelvis on initial scan.  Left arm contrast extravasation.    FINDINGS:    LOWER CHEST: No visualized pleural effusion. Dependent lung scarring of   the imaged thorax. Partially imaged heart is enlarged with aortic valve   calcification. Elevated right hemidiaphragm.    Solid organ evaluation limited due to noncontrast technique and motion.    LIVER: Subcentimeter hepatic hypodensities too small to characterize.  BILE DUCTS: No distention  GALLBLADDER: Unremarkable CT appearance  SPLEEN: Spleen size within normal limits  PANCREAS: Motion limited evaluation of the pancreas. No clear evidence of   acute peripancreatic inflammation. Correlate with lipase and clinical   exam if there is concern for pancreatitis.  ADRENALS: Unremarkable  KIDNEYS/URETERS: No hydronephrosis or obstructing ureteralcalculus.   Right renal cyst.    BLADDER: Minimally distended.  REPRODUCTIVE ORGANS: Small amount of air within the endometrial canal, of   unclear significance, image 67 series 6. Uterus and adnexa are otherwise   suboptimally characterized on CT. Old right adnexal dermoid cysts   measuring 3.1 cm on image 112 series 2 and 4.1 cm image 106 series 2.    BOWEL: Stomach is underdistended. No small bowel distention. Appendix is   not obstructed. Overall mild stool burden of the colon and overall under   distention limits a violation of the colonic mucosa. Moderate to severe   stool distention of the distal sigmoid colon and rectum with slight   surrounding stranding. Correlate for constipation/stercoral proctitis.   Colonic diverticulosis, without diverticulitis.  PERITONEUM/RETROPERITONEUM: No ascites  VESSELS: No abdominal aortic aneurysm. Aortoiliac calcified plaque  LYMPH NODES: No enlarged lymph nodes by CT size criteria  ABDOMINAL WALL: Tiny fat-containing umbilical hernia  BONES: Degenerative changes of the bones including multilevel spinal   spondylosis. Central canal and neural foramina are not adequately   assessed on this study. Extensive remodeling and arthrosis of the right   hip. Hypertrophic trabeculated appearance of the right hemipelvis likely   due to underlying Paget's disease.    IMPRESSION:    Motion limited noncontrast study. Etiology of given clinical symptoms is   not clearly elucidated.    Motion limited evaluation of the pancreas. No clear evidence of acute   peripancreatic inflammation. Correlate with lipase and clinical exam if   there is concern for pancreatitis.    Moderate to severe stool distention of the distal sigmoid colon and   rectum with slight surrounding stranding. Correlate for   constipation/stercoral proctitis.    Drop of air within the endometrial canal of unclear significance.    Left arm contrast extravasation. Marked soft tissue edema of the   partially imaged left upper extremity on the first set of images.   Recommend clinical correlation to exclude compartment syndrome.    --- End of Report ---    < end of copied text >  < from: CT Abdomen and Pelvis w/ IV Cont (04.08.25 @ 20:55) >  COMPARISON: CT abdomen pelvis 9/13/2019    CONTRAST/COMPLICATIONS:  IV Contrast: Omnipaque 350 (accession [id number]), NONE (accession   [id number])  90 cc administered   10 cc discarded  Oral Contrast: NONE  Complication: Extravasation (accession [id number]), . (accession [id number])    PROCEDURE:  CT of the Abdomen and Pelvis was performed.  Sagittal and coronal reformats were performed.  Scan was repeated due to incomplete imaging of the pelvis on initial scan.  Left arm contrast extravasation.    FINDINGS:    LOWER CHEST: No visualized pleural effusion. Dependent lung scarring of   the imaged thorax. Partially imaged heart is enlarged with aortic valve   calcification. Elevated right hemidiaphragm.    Solid organ evaluation limited due to noncontrast technique and motion.    LIVER: Subcentimeter hepatic hypodensities too small to characterize.  BILE DUCTS: No distention  GALLBLADDER: Unremarkable CT appearance  SPLEEN: Spleen size within normal limits  PANCREAS: Motion limited evaluation of the pancreas. No clear evidence of   acute peripancreatic inflammation. Correlate with lipase and clinical   exam if there is concern for pancreatitis.  ADRENALS: Unremarkable  KIDNEYS/URETERS: No hydronephrosis or obstructing ureteralcalculus.   Right renal cyst.    BLADDER: Minimally distended.  REPRODUCTIVE ORGANS: Small amount of air within the endometrial canal, of   unclear significance, image 67 series 6. Uterus and adnexa are otherwise   suboptimally characterized on CT. Old right adnexal dermoid cysts   measuring 3.1 cm on image 112 series 2 and 4.1 cm image 106 series 2.    BOWEL: Stomach is underdistended. No small bowel distention. Appendix is   not obstructed. Overall mild stool burden of the colon and overall under   distention limits a violation of the colonic mucosa. Moderate to severe   stool distention of the distal sigmoid colon and rectum with slight   surrounding stranding. Correlate for constipation/stercoral proctitis.   Colonic diverticulosis, without diverticulitis.  PERITONEUM/RETROPERITONEUM: No ascites  VESSELS: No abdominal aortic aneurysm. Aortoiliac calcified plaque  LYMPH NODES: No enlarged lymph nodes by CT size criteria  ABDOMINAL WALL: Tiny fat-containing umbilical hernia  BONES: Degenerative changes of the bones including multilevel spinal   spondylosis. Central canal and neural foramina are not adequately   assessed on this study. Extensive remodeling and arthrosis of the right   hip. Hypertrophic trabeculated appearance of the right hemipelvis likely   due to underlying Paget's disease.    IMPRESSION:    Motion limited noncontrast study. Etiology of given clinical symptoms is   not clearly elucidated.    Motion limited evaluation of the pancreas. No clear evidence of acute   peripancreatic inflammation. Correlate with lipase and clinical exam if   there is concern for pancreatitis.    Moderate to severe stool distention of the distal sigmoid colon and   rectum with slight surrounding stranding. Correlate for   constipation/stercoral proctitis.    Drop of air within the endometrial canal of unclear significance.    Left arm contrast extravasation. Marked soft tissue edema of the   partially imaged left upper extremity on the first set of images.   Recommend clinical correlation to exclude compartment syndrome.    --- End of Report ---      < end of copied text >      Consultant(s) Notes Reviewed:   YES    Care Discussed with Consultants : YES    Plan of care was discussed with patient and /or primary care giver; all questions and concerns were addressed and care was aligned with patient's wishes.

## 2025-04-14 NOTE — PROGRESS NOTE ADULT - SUBJECTIVE AND OBJECTIVE BOX
Patient is a 89y old  Female who presents with a chief complaint of Colitis (13 Apr 2025 15:53)  Awake, alert, lying in bed in NAD. Doing well on RA    INTERVAL HPI/OVERNIGHT EVENTS:      VITAL SIGNS:  T(F): 97.6 (04-14-25 @ 04:55)  HR: 73 (04-14-25 @ 04:55)  BP: 107/79 (04-14-25 @ 04:55)  RR: 20 (04-14-25 @ 04:55)  SpO2: 100% (04-14-25 @ 04:55)  Wt(kg): --  I&O's Detail          REVIEW OF SYSTEMS:    CONSTITUTIONAL:  No fevers, chills, sweats    HEENT:  Eyes:  No diplopia or blurred vision. ENT:  No earache, sore throat or runny nose.    CARDIOVASCULAR:  No pressure, squeezing, tightness, or heaviness about the chest; no palpitations.    RESPIRATORY:  Per HPI    GASTROINTESTINAL:  No abdominal pain, nausea, vomiting or diarrhea.    GENITOURINARY:  No dysuria, frequency or urgency.    NEUROLOGIC:  No paresthesias, fasciculations, seizures or weakness.    PSYCHIATRIC:  No disorder of thought or mood.      PHYSICAL EXAM:    Constitutional: Well developed and nourished  Eyes:Perrla  ENMT: normal  Neck:supple  Respiratory: good air entry  Cardiovascular: S1 S2 regular  Gastrointestinal: Soft, Non tender  Extremities: No edema  Vascular:normal  Neurological:Awake, alert,Ox3  Musculoskeletal:Normal      MEDICATIONS  (STANDING):  apixaban 5 milliGRAM(s) Oral every 12 hours  atorvastatin 10 milliGRAM(s) Oral at bedtime  carvedilol 6.25 milliGRAM(s) Oral every 12 hours  cefTRIAXone   IVPB 1000 milliGRAM(s) IV Intermittent every 24 hours  chlorhexidine 2% Cloths 1 Application(s) Topical <User Schedule>  famotidine    Tablet 20 milliGRAM(s) Oral daily  fluticasone propionate/ salmeterol 100-50 MICROgram(s) Diskus 1 Dose(s) Inhalation two times a day  insulin glargine Injectable (LANTUS) 7 Unit(s) SubCutaneous at bedtime  insulin lispro (ADMELOG) corrective regimen sliding scale   SubCutaneous three times a day before meals  insulin lispro (ADMELOG) corrective regimen sliding scale   SubCutaneous at bedtime  metroNIDAZOLE  IVPB 500 milliGRAM(s) IV Intermittent every 12 hours  montelukast 10 milliGRAM(s) Oral daily  nystatin Powder 1 Application(s) Topical two times a day  polyethylene glycol 3350 17 Gram(s) Oral two times a day  senna 2 Tablet(s) Oral at bedtime  sodium chloride 0.9%. 1000 milliLiter(s) (70 mL/Hr) IV Continuous <Continuous>  tiotropium 2.5 MICROgram(s) Inhaler 2 Puff(s) Inhalation daily    MEDICATIONS  (PRN):  acetaminophen     Tablet .. 650 milliGRAM(s) Oral every 6 hours PRN Temp greater or equal to 38C (100.4F), Mild Pain (1 - 3)  albuterol/ipratropium for Nebulization 3 milliLiter(s) Nebulizer every 6 hours PRN Bronchospasm  ondansetron Injectable 4 milliGRAM(s) IV Push every 8 hours PRN Nausea and/or Vomiting      Allergies    Shrimp (Unknown)  losartan (Angioedema)    Intolerances    Chicken (Stomach Upset)      LABS:                        13.7   5.04  )-----------( 163      ( 13 Apr 2025 06:17 )             40.3     04-13    137  |  105  |  13  ----------------------------<  118[H]  4.0   |  27  |  0.61    Ca    8.2[L]      13 Apr 2025 06:17        Urinalysis Basic - ( 13 Apr 2025 06:17 )    Color: x / Appearance: x / SG: x / pH: x  Gluc: 118 mg/dL / Ketone: x  / Bili: x / Urobili: x   Blood: x / Protein: x / Nitrite: x   Leuk Esterase: x / RBC: x / WBC x   Sq Epi: x / Non Sq Epi: x / Bacteria: x            CAPILLARY BLOOD GLUCOSE      POCT Blood Glucose.: 103 mg/dL (14 Apr 2025 07:55)  POCT Blood Glucose.: 128 mg/dL (13 Apr 2025 21:09)  POCT Blood Glucose.: 193 mg/dL (13 Apr 2025 16:56)  POCT Blood Glucose.: 164 mg/dL (13 Apr 2025 11:41)        RADIOLOGY & ADDITIONAL TESTS:    CXR:    Ct scan chest:    ekg;    echo: Patient is a 89y old  Female who presents with a chief complaint of Colitis (13 Apr 2025 15:53)  Awake, alert, lying in bed in NAD. Doing well on RA. AVAPS at night    INTERVAL HPI/OVERNIGHT EVENTS:      VITAL SIGNS:  T(F): 97.6 (04-14-25 @ 04:55)  HR: 73 (04-14-25 @ 04:55)  BP: 107/79 (04-14-25 @ 04:55)  RR: 20 (04-14-25 @ 04:55)  SpO2: 100% (04-14-25 @ 04:55)  Wt(kg): --  I&O's Detail          REVIEW OF SYSTEMS:    CONSTITUTIONAL:  No fevers, chills, sweats    HEENT:  Eyes:  No diplopia or blurred vision. ENT:  No earache, sore throat or runny nose.    CARDIOVASCULAR:  No pressure, squeezing, tightness, or heaviness about the chest; no palpitations.    RESPIRATORY:  Per HPI    GASTROINTESTINAL:  No abdominal pain, nausea, vomiting or diarrhea.    GENITOURINARY:  No dysuria, frequency or urgency.    NEUROLOGIC:  No paresthesias, fasciculations, seizures or weakness.    PSYCHIATRIC:  No disorder of thought or mood.      PHYSICAL EXAM:    Constitutional: Well developed and nourished  Eyes:Perrla  ENMT: normal  Neck:supple  Respiratory: good air entry  Cardiovascular: S1 S2 regular  Gastrointestinal: Soft, Non tender  Extremities: No edema  Vascular:normal  Neurological:Awake, alert,Ox3  Musculoskeletal:Normal      MEDICATIONS  (STANDING):  apixaban 5 milliGRAM(s) Oral every 12 hours  atorvastatin 10 milliGRAM(s) Oral at bedtime  carvedilol 6.25 milliGRAM(s) Oral every 12 hours  cefTRIAXone   IVPB 1000 milliGRAM(s) IV Intermittent every 24 hours  chlorhexidine 2% Cloths 1 Application(s) Topical <User Schedule>  famotidine    Tablet 20 milliGRAM(s) Oral daily  fluticasone propionate/ salmeterol 100-50 MICROgram(s) Diskus 1 Dose(s) Inhalation two times a day  insulin glargine Injectable (LANTUS) 7 Unit(s) SubCutaneous at bedtime  insulin lispro (ADMELOG) corrective regimen sliding scale   SubCutaneous three times a day before meals  insulin lispro (ADMELOG) corrective regimen sliding scale   SubCutaneous at bedtime  metroNIDAZOLE  IVPB 500 milliGRAM(s) IV Intermittent every 12 hours  montelukast 10 milliGRAM(s) Oral daily  nystatin Powder 1 Application(s) Topical two times a day  polyethylene glycol 3350 17 Gram(s) Oral two times a day  senna 2 Tablet(s) Oral at bedtime  sodium chloride 0.9%. 1000 milliLiter(s) (70 mL/Hr) IV Continuous <Continuous>  tiotropium 2.5 MICROgram(s) Inhaler 2 Puff(s) Inhalation daily    MEDICATIONS  (PRN):  acetaminophen     Tablet .. 650 milliGRAM(s) Oral every 6 hours PRN Temp greater or equal to 38C (100.4F), Mild Pain (1 - 3)  albuterol/ipratropium for Nebulization 3 milliLiter(s) Nebulizer every 6 hours PRN Bronchospasm  ondansetron Injectable 4 milliGRAM(s) IV Push every 8 hours PRN Nausea and/or Vomiting      Allergies    Shrimp (Unknown)  losartan (Angioedema)    Intolerances    Chicken (Stomach Upset)      LABS:                        13.7   5.04  )-----------( 163      ( 13 Apr 2025 06:17 )             40.3     04-13    137  |  105  |  13  ----------------------------<  118[H]  4.0   |  27  |  0.61    Ca    8.2[L]      13 Apr 2025 06:17        Urinalysis Basic - ( 13 Apr 2025 06:17 )    Color: x / Appearance: x / SG: x / pH: x  Gluc: 118 mg/dL / Ketone: x  / Bili: x / Urobili: x   Blood: x / Protein: x / Nitrite: x   Leuk Esterase: x / RBC: x / WBC x   Sq Epi: x / Non Sq Epi: x / Bacteria: x            CAPILLARY BLOOD GLUCOSE      POCT Blood Glucose.: 103 mg/dL (14 Apr 2025 07:55)  POCT Blood Glucose.: 128 mg/dL (13 Apr 2025 21:09)  POCT Blood Glucose.: 193 mg/dL (13 Apr 2025 16:56)  POCT Blood Glucose.: 164 mg/dL (13 Apr 2025 11:41)        RADIOLOGY & ADDITIONAL TESTS:  < from: CT Abdomen and Pelvis No Cont (04.08.25 @ 21:09) >  IMPRESSION:    Motion limited noncontrast study. Etiology of given clinical symptoms is   not clearly elucidated.    Motion limited evaluation of the pancreas. No clear evidence of acute   peripancreatic inflammation. Correlate with lipase and clinical exam if   there is concern for pancreatitis.    Moderate to severe stool distention of the distal sigmoid colon and   rectum with slight surrounding stranding. Correlate for   constipation/stercoral proctitis.    Drop of air within the endometrial canal of unclear significance.    Left arm contrast extravasation. Marked soft tissue edema of the   partially imaged left upper extremity on the first set of images.   Recommend clinical correlation to exclude compartment syndrome.      < end of copied text >    CXR:    Ct scan chest:    ekg;    echo:

## 2025-04-14 NOTE — PHYSICAL THERAPY INITIAL EVALUATION ADULT - DIAGNOSIS, PT EVAL
(ICF Model) Pt. present w/deficits in Body Structures/Function (Impairments), incl: Strength, Balance, visual impairment, fear of falling, leading to deficits in performing transfers and gait

## 2025-04-14 NOTE — DISCHARGE NOTE PROVIDER - CARE PROVIDER_API CALL
Ayden Bianchi  Internal Medicine  [address] [place] [address], [address]  [place] [address]  Phone: [phone]  Fax: [phone]  Established Patient  Follow Up Time: 1 week    Kristine Siu  Cardiology  [address] [place] [address]  Phone: [phone]  Fax: [phone]  Established Patient  Follow Up Time: 1 week

## 2025-04-14 NOTE — PROGRESS NOTE ADULT - ASSESSMENT
89 yo F COPD, CHF, AFIB ( on eliquis), DM that comes in for abdominal paincolitis,COVID+.  1.Colitis-abx.  2.COVID+-Pulm f/u.  3.Afib-eliquis,coreg.  4.DM-Insulin.  5.COPD-MDI.  6.PPI.

## 2025-04-14 NOTE — DISCHARGE NOTE PROVIDER - HOSPITAL COURSE
89y old  Female with COPD, CHF, AFIB ( on eliquis), DM that comes in for abdominal pain. Patient a poor historian at bedside, unable to reach over the phone to daughter. Patient endoring that today was feeling unwell with weakness, dizziness after physical therapy. Per ED note patient was also noted to be sleepy. On admission, she found to have no fever but CT abd/pelvis shows " Moderate to severe stool distention of the distal sigmoid colon and rectum with slight surrounding stranding. Correlate for constipation/stercoral proctitis." She has started on Ceftriaxone and Flagyl.  < from: CT Abdomen and Pelvis No Cont (04.08.25 @ 21:09) >    Moderate to severe stool distention of the distal sigmoid colon and   rectum with slight surrounding stranding. Correlate for   constipation/stercoral proctitis.    Drop of air within the endometrial canal of unclear significance.    Left arm contrast extravasation. Marked soft tissue edema of the   partially imaged left upper extremity on the first set of images.   Recommend clinical correlation to exclude compartment syndrome.    --- End of Report ---    < end of copied text >

## 2025-04-14 NOTE — PROGRESS NOTE ADULT - ASSESSMENT
89y old  Female with COPD, CHF, AFIB ( on eliquis), DM that comes in for abdominal pain. Patient a poor historian at bedside, unable to reach over the phone to daughter. Patient endoring that today was feeling unwell with weakness, dizziness after physical therapy. Per ED note patient was also noted to be sleepy. On admission, she found to have no fever but CT abd/pelvis shows " Moderate to severe stool distention of the distal sigmoid colon and rectum with slight surrounding stranding. Correlate for constipation/stercoral proctitis." She has started on Ceftriaxone and Flagyl.

## 2025-04-14 NOTE — PHYSICAL THERAPY INITIAL EVALUATION ADULT - GENERAL OBSERVATIONS, REHAB EVAL
Patient received Patient received in supine, NAD, patient is legally blind, states like she is falling.

## 2025-04-14 NOTE — PROGRESS NOTE ADULT - PROBLEM SELECTOR PLAN 1
Presented with Abdominal pain, weakness and dizziness.  CT A/P: Moderate to severe stool distention of the distal sigmoid colon and rectum with slight surrounding stranding. Correlate for constipation/stercoral proctitis.  C/w Ceftriaxone and Flagyl   C/w Bowel regimen Senna and Miralax  ID Dr. Denton following. negative No joint pain, swelling or deformity; no limitation of movement

## 2025-04-14 NOTE — DISCHARGE NOTE PROVIDER - NSDCCPCAREPLAN_GEN_ALL_CORE_FT
PRINCIPAL DISCHARGE DIAGNOSIS  Diagnosis: Colitis  Assessment and Plan of Treatment: While in the hospital you were given IV antibiotics. Your CT A/P showed a moderate to severe stool distention of the distal sigmoid colon and rectum with slight surrounding stranding. Continue taking Senna and Miralax at home.....      SECONDARY DISCHARGE DIAGNOSES  Diagnosis: COVID-19  Assessment and Plan of Treatment: You tested positive on ad mission for COVID 19 virus. You were given IV Remdesivir and steroids. You no longer need any steroids. You have completed your isolation for COVID.    Diagnosis: COPD, mild  Assessment and Plan of Treatment: Continue using your AVAPS-AE nightly and as needed during the day. Use your oxygen as prescribed. Continue using your inhalers as prescribed.  Remember to rinse your mouth after using your inhalers. Report any shortness of breath immediately to your doctor.    Diagnosis: Chronic atrial fibrillation  Assessment and Plan of Treatment: Continue taking your Eliquis twice a day Continue taking your coreg as directed. Report any rapid heart rates immediately.    Diagnosis: HTN (hypertension)  Assessment and Plan of Treatment: Continue taking coreg, lasix and hydralazine as directed. Follow a low sodium diet.    Diagnosis: Diabetes mellitus  Assessment and Plan of Treatment: Continue taking your insulin at night and metformin. Limit the amount of sugars and carbohydrates in your diet.

## 2025-04-15 ENCOUNTER — TRANSCRIPTION ENCOUNTER (OUTPATIENT)
Age: 89
End: 2025-04-15

## 2025-04-15 VITALS — WEIGHT: 208.56 LBS

## 2025-04-15 LAB
GLUCOSE BLDC GLUCOMTR-MCNC: 113 MG/DL — HIGH (ref 70–99)
GLUCOSE BLDC GLUCOMTR-MCNC: 80 MG/DL — SIGNIFICANT CHANGE UP (ref 70–99)

## 2025-04-15 PROCEDURE — 94760 N-INVAS EAR/PLS OXIMETRY 1: CPT

## 2025-04-15 PROCEDURE — 80048 BASIC METABOLIC PNL TOTAL CA: CPT

## 2025-04-15 PROCEDURE — 83690 ASSAY OF LIPASE: CPT

## 2025-04-15 PROCEDURE — 83735 ASSAY OF MAGNESIUM: CPT

## 2025-04-15 PROCEDURE — 84295 ASSAY OF SERUM SODIUM: CPT

## 2025-04-15 PROCEDURE — 80053 COMPREHEN METABOLIC PANEL: CPT

## 2025-04-15 PROCEDURE — 82962 GLUCOSE BLOOD TEST: CPT

## 2025-04-15 PROCEDURE — 94640 AIRWAY INHALATION TREATMENT: CPT

## 2025-04-15 PROCEDURE — 84132 ASSAY OF SERUM POTASSIUM: CPT

## 2025-04-15 PROCEDURE — 99285 EMERGENCY DEPT VISIT HI MDM: CPT | Mod: 25

## 2025-04-15 PROCEDURE — 87641 MR-STAPH DNA AMP PROBE: CPT

## 2025-04-15 PROCEDURE — 74176 CT ABD & PELVIS W/O CONTRAST: CPT | Mod: MC

## 2025-04-15 PROCEDURE — 74177 CT ABD & PELVIS W/CONTRAST: CPT | Mod: MC

## 2025-04-15 PROCEDURE — 36415 COLL VENOUS BLD VENIPUNCTURE: CPT

## 2025-04-15 PROCEDURE — 94660 CPAP INITIATION&MGMT: CPT

## 2025-04-15 PROCEDURE — 85025 COMPLETE CBC W/AUTO DIFF WBC: CPT

## 2025-04-15 PROCEDURE — 97163 PT EVAL HIGH COMPLEX 45 MIN: CPT

## 2025-04-15 PROCEDURE — 83605 ASSAY OF LACTIC ACID: CPT

## 2025-04-15 PROCEDURE — 87640 STAPH A DNA AMP PROBE: CPT

## 2025-04-15 PROCEDURE — 82803 BLOOD GASES ANY COMBINATION: CPT

## 2025-04-15 PROCEDURE — 96374 THER/PROPH/DIAG INJ IV PUSH: CPT

## 2025-04-15 PROCEDURE — 85027 COMPLETE CBC AUTOMATED: CPT

## 2025-04-15 PROCEDURE — 84100 ASSAY OF PHOSPHORUS: CPT

## 2025-04-15 PROCEDURE — 82947 ASSAY GLUCOSE BLOOD QUANT: CPT

## 2025-04-15 RX ADMIN — NYSTATIN 1 APPLICATION(S): 100000 CREAM TOPICAL at 05:16

## 2025-04-15 RX ADMIN — MONTELUKAST SODIUM 10 MILLIGRAM(S): 10 TABLET ORAL at 12:42

## 2025-04-15 RX ADMIN — POLYETHYLENE GLYCOL 3350 17 GRAM(S): 17 POWDER, FOR SOLUTION ORAL at 05:17

## 2025-04-15 RX ADMIN — Medication 1 APPLICATION(S): at 05:17

## 2025-04-15 RX ADMIN — Medication 100 MILLIGRAM(S): at 05:16

## 2025-04-15 RX ADMIN — APIXABAN 5 MILLIGRAM(S): 2.5 TABLET, FILM COATED ORAL at 05:18

## 2025-04-15 RX ADMIN — Medication 1 DOSE(S): at 12:42

## 2025-04-15 RX ADMIN — CEFTRIAXONE 100 MILLIGRAM(S): 500 INJECTION, POWDER, FOR SOLUTION INTRAMUSCULAR; INTRAVENOUS at 06:45

## 2025-04-15 RX ADMIN — Medication 20 MILLIGRAM(S): at 12:44

## 2025-04-15 RX ADMIN — CARVEDILOL 6.25 MILLIGRAM(S): 3.12 TABLET, FILM COATED ORAL at 05:34

## 2025-04-15 NOTE — PROGRESS NOTE ADULT - SUBJECTIVE AND OBJECTIVE BOX
Patient is a 89y old  Female who presents with a chief complaint of Colitis (15 Apr 2025 08:07)  Asleep, lying in bed in NAD. Doing well on RA    INTERVAL HPI/OVERNIGHT EVENTS:      VITAL SIGNS:  T(F): 97.3 (04-15-25 @ 04:59)  HR: 70 (04-15-25 @ 04:59)  BP: 115/89 (04-15-25 @ 04:59)  RR: 19 (04-15-25 @ 04:59)  SpO2: 100% (04-15-25 @ 04:59)  Wt(kg): --  I&O's Detail    14 Apr 2025 07:01  -  15 Apr 2025 07:00  --------------------------------------------------------  IN:  Total IN: 0 mL    OUT:    Voided (mL): 1150 mL  Total OUT: 1150 mL    Total NET: -1150 mL              REVIEW OF SYSTEMS:    CONSTITUTIONAL:  No fevers, chills, sweats    HEENT:  Eyes:  No diplopia or blurred vision. ENT:  No earache, sore throat or runny nose.    CARDIOVASCULAR:  No pressure, squeezing, tightness, or heaviness about the chest; no palpitations.    RESPIRATORY:  Per HPI    GASTROINTESTINAL:  No abdominal pain, nausea, vomiting or diarrhea.    GENITOURINARY:  No dysuria, frequency or urgency.    NEUROLOGIC:  No paresthesias, fasciculations, seizures or weakness.    PSYCHIATRIC:  No disorder of thought or mood.      PHYSICAL EXAM:    Constitutional: Well developed and nourished  Eyes:Perrla  ENMT: normal  Neck:supple  Respiratory: good air entry  Cardiovascular: S1 S2 regular  Gastrointestinal: Soft, Non tender  Extremities: No edema  Vascular:normal  Neurological:Asleep  Musculoskeletal:Normal      MEDICATIONS  (STANDING):  apixaban 5 milliGRAM(s) Oral every 12 hours  atorvastatin 10 milliGRAM(s) Oral at bedtime  carvedilol 6.25 milliGRAM(s) Oral every 12 hours  chlorhexidine 2% Cloths 1 Application(s) Topical <User Schedule>  famotidine    Tablet 20 milliGRAM(s) Oral daily  fluticasone propionate/ salmeterol 100-50 MICROgram(s) Diskus 1 Dose(s) Inhalation two times a day  insulin glargine Injectable (LANTUS) 7 Unit(s) SubCutaneous at bedtime  insulin lispro (ADMELOG) corrective regimen sliding scale   SubCutaneous three times a day before meals  insulin lispro (ADMELOG) corrective regimen sliding scale   SubCutaneous at bedtime  metroNIDAZOLE  IVPB 500 milliGRAM(s) IV Intermittent every 12 hours  montelukast 10 milliGRAM(s) Oral daily  nystatin Powder 1 Application(s) Topical two times a day  polyethylene glycol 3350 17 Gram(s) Oral two times a day  senna 2 Tablet(s) Oral at bedtime  sodium chloride 0.9%. 1000 milliLiter(s) (70 mL/Hr) IV Continuous <Continuous>  tiotropium 2.5 MICROgram(s) Inhaler 2 Puff(s) Inhalation daily    MEDICATIONS  (PRN):  acetaminophen     Tablet .. 650 milliGRAM(s) Oral every 6 hours PRN Temp greater or equal to 38C (100.4F), Mild Pain (1 - 3)  albuterol/ipratropium for Nebulization 3 milliLiter(s) Nebulizer every 6 hours PRN Bronchospasm  ondansetron Injectable 4 milliGRAM(s) IV Push every 8 hours PRN Nausea and/or Vomiting      Allergies    Shrimp (Unknown)  losartan (Angioedema)    Intolerances    Chicken (Stomach Upset)      LABS:                    CAPILLARY BLOOD GLUCOSE      POCT Blood Glucose.: 80 mg/dL (15 Apr 2025 08:02)  POCT Blood Glucose.: 156 mg/dL (14 Apr 2025 21:20)  POCT Blood Glucose.: 113 mg/dL (14 Apr 2025 16:51)  POCT Blood Glucose.: 217 mg/dL (14 Apr 2025 11:56)        RADIOLOGY & ADDITIONAL TESTS:  < from: CT Abdomen and Pelvis No Cont (04.08.25 @ 21:09) >  IMPRESSION:    Motion limited noncontrast study. Etiology of given clinical symptoms is   not clearly elucidated.    Motion limited evaluation of the pancreas. No clear evidence of acute   peripancreatic inflammation. Correlate with lipase and clinical exam if   there is concern for pancreatitis.    Moderate to severe stool distention of the distal sigmoid colon and   rectum with slight surrounding stranding. Correlate for   constipation/stercoral proctitis.    Drop of air within the endometrial canal of unclear significance.    Left arm contrast extravasation. Marked soft tissue edema of the   partially imaged left upper extremity on the first set of images.   Recommend clinical correlation to exclude compartment syndrome.    --- End of Report ---    < end of copied text >    CXR:    Ct scan chest:    ekg;    echo:

## 2025-04-15 NOTE — PROGRESS NOTE ADULT - PROBLEM SELECTOR PLAN 3
H/o  HTN on coreg, hydralazine, and Lasix at home  -B/p normotensive  -C/w Coreg 6.25mg BID  -C/w holding Lasix and hydralazine  -Cards Dr. Siu following
Not in exacerbation.  Continue AVAPS nightly and as needed during the day.  Has home AVAPS-AE and oxygen.  Continue bronchodilators and ICS
Not in exacerbation.  Continue AVAPS nightly and as needed during the day.  Has home AVAPS-AE and oxygen.  Continue bronchodilators and ICS
isolation precautions  oxygen supp prn  monitor oxygen sat  monitor LDH. LFTs, D-Dimer, Ferritin, CRP and procalcitonin  follow up Covid PCR  Vit C, D and Zinc supp  Bronchodilators
hx of HTN on oral meds  -noted BP in the soft side   -will continue hold lasixs, and hydralazine  - resume coreg  - card following
H/o  HTN on coreg, hydralazine, and Lasix at home  -B/p normotensive  -C/w Coreg 6.25mg BID  -C/w holding Lasix and hydralazine  -Cards Dr. Siu following
isolation precautions  oxygen supp prn  monitor oxygen sat  monitor LDH. LFTs, D-Dimer, Ferritin, CRP and procalcitonin  follow up Covid PCR  Vit C, D and Zinc supp  Bronchodilators
H/o  HTN on coreg, hydralazine, and Lasix at home  -B/p normotensive  -C/w Coreg 6.25mg BID  -C/w holding Lasix and hydralazine  -Cards Dr. Siu following
isolation precautions  oxygen supp prn  monitor oxygen sat  monitor LDH. LFTs, D-Dimer, Ferritin, CRP and procalcitonin  follow up Covid PCR  Vit C, D and Zinc supp  Bronchodilators  PT Eval
H/o  HTN on coreg, hydralazine, and Lasix at home  -B/p normotensive  -C/w Coreg 6.25mg BID  -C/w holding Lasix and hydralazine  -Cards Dr. Siu following
hx of HTN on oral meds  -noted BP in the soft side   -will continue hold lasixs, and hydralazine  - resume coreg  - card following

## 2025-04-15 NOTE — PROGRESS NOTE ADULT - PROBLEM SELECTOR PLAN 7
DVT ppx: Eliquis
DVT ppx: Eliquis for Afib  GI PPX: Pepsid
DVT ppx: Eliquis for Afib  GI PPX: Pepsid
DVT ppx: Eliquis
DVT prophy  eliquis
DVT prophy  eliquis
Continue Eliquis and GI prophylaxis.  AVAPS nightly and as need during the day.  Currently maintaining oxygen saturation on RA.  Continue bowel regimen.  PT consult pending.
Continue Eliquis and GI prophylaxis.  AVAPS nightly and as need during the day.  Currently maintaining oxygen saturation on RA.  Continue bowel regimen.  PT consult pending.

## 2025-04-15 NOTE — DISCHARGE NOTE NURSING/CASE MANAGEMENT/SOCIAL WORK - PATIENT PORTAL LINK FT
You can access the FollowMyHealth Patient Portal offered by [facility] by registering at the following website: [email or web address]. By joining [employer]’s FollowMyHealth portal, you will also be able to view your health information using other applications (apps) compatible with our system.

## 2025-04-15 NOTE — PROGRESS NOTE ADULT - PROBLEM SELECTOR PLAN 4
hx of afib on eliquis and BB   - rate controlled  - cont coreg and eliquis
Accuchecks with reg insulin coverage  HBA1C
H/o DM on Lantus and Metformin at home.  Not controlled.  A1c 10.6 (3/22/25) prior admission, followed by Endo Dr. Funk  C/w Edmund 7units and [facility]  C/w FS ACHS  Monitor and adjust.
hx of afib on eliquis and BB   - rate controlled  - cont coreg and eliquis
H/o Afib on Eliquis and Coreg at home  - Rate controlled  - C/w Coreg and Eliquis
H/o Afib on Eliquis and Coreg at home  - Rate controlled  - C/w Coreg and Eliquis
H/o DM on Lantus and Metformin at home.  Not controlled.  A1c 10.6 (3/22/25) prior admission, followed by Endo Dr. Funk  C/w Edmund 7units and [facility]  C/w FS ACHS  Monitor and adjust.
Accuchecks with reg insulin coverage  HBA1C
H/o Afib on Eliquis and Coreg at home  - Rate controlled  - C/w Coreg and Eliquis
Accuchecks with reg insulin coverage  HBA1C
H/o Afib on Eliquis and Coreg at home  - Rate controlled  - C/w Coreg and Eliquis

## 2025-04-15 NOTE — PROGRESS NOTE ADULT - PROBLEM SELECTOR PLAN 6
monitor BP  cont meds
hx of COPD on home inhalers   - uses avaps at home at night- resumed   -c/w home inhalers
monitor BP  cont meds
H/o COPD on home inhalers   -Uses Avaps nightly at home-- Continue    -C/w Advair and Spiriva
monitor BP  cont meds
hx of COPD on home inhalers   - uses avaps at home at night- resumed   -c/w home inhalers
Controlled  Continue coreg, lasix and hydralazine.
Controlled  Continue coreg, lasix and hydralazine.

## 2025-04-15 NOTE — PROGRESS NOTE ADULT - PROBLEM SELECTOR PLAN 1
Presented with Abdominal pain, weakness and dizziness.  CT A/P: Moderate to severe stool distention of the distal sigmoid colon and rectum with slight surrounding stranding. Correlate for constipation/stercoral proctitis.  C/w Ceftriaxone and Flagyl   C/w Bowel regimen Senna and Miralax  ID Dr. Denton following.

## 2025-04-15 NOTE — PROGRESS NOTE ADULT - PROBLEM SELECTOR PROBLEM 6
HTN (hypertension)
COPD, mild
HTN (hypertension)
COPD, mild
HTN (hypertension)
.

## 2025-04-15 NOTE — PROGRESS NOTE ADULT - REASON FOR ADMISSION
Colitis

## 2025-04-15 NOTE — PROGRESS NOTE ADULT - PROBLEM SELECTOR PLAN 2
Bronchodilators  Spiriva  PFTs as OP
Tested positive upon admission.  Currently maintaining oxygen saturation on RA.  S/P Remdesivir X 4 days  Monitor oxygen saturation.  Continue isolation.
symptomatic   -O2 sat >95% on RA   - C/w Remdesivir x 4 days given no supplemental O2 use  - Supportive care
symptomatic   -O2 sat >95% on RA   - C/w Remdesivir x 3 days given no supplemental O2 use  - Supportive care
symptomatic   - start remdesivir per ID recs 4/9
Bronchodilators  Spiriva  PFTs as OP
Bronchodilators  Spiriva  PFTs as OP
symptomatic   -O2 sat >95% on RA   - C/w Remdesivir x 3 days given no supplemental O2 use > (Day 3)  - Supportive care
Tested positive upon admission.  Currently maintaining oxygen saturation on RA.  S/P Remdesivir X 4 days  Monitor oxygen saturation.  Continue isolation.
symptomatic   -O2 sat >95% on RA   - C/w Remdesivir x 3 days given no supplemental O2 use > (Day 3)  - Supportive care

## 2025-04-15 NOTE — PROGRESS NOTE ADULT - PROBLEM SELECTOR PROBLEM 3
2019 novel coronavirus disease (COVID-19)
HTN (hypertension)
2019 novel coronavirus disease (COVID-19)
HTN (hypertension)
2019 novel coronavirus disease (COVID-19)
COPD, mild
HTN (hypertension)
HTN (hypertension)
COPD, mild
HTN (hypertension)
HTN (hypertension)

## 2025-04-15 NOTE — DISCHARGE NOTE NURSING/CASE MANAGEMENT/SOCIAL WORK - NSDCVIVACCINE_GEN_ALL_CORE_FT
influenza, injectable, quadrivalent, preservative free; 27-Oct-2017 14:11; Brandy Joaquin (RN); [employer]; [id number]; IntraMuscular; Deltoid Left.; 0.5 milliLiter(s); VIS (VIS Published: 07-Aug-2015, VIS Presented: 27-Oct-2017);   influenza, high-dose, quadrivalent; 18-Nov-2021 06:24; Yemi Santana (WINTER); [employer]; [id number] (Exp. Date: 30-Jun-2022); IntraMuscular; Deltoid Left.; 0.7 milliLiter(s); VIS (VIS Published: 06-Aug-2021, VIS Presented: 18-Nov-2021);

## 2025-04-15 NOTE — PROGRESS NOTE ADULT - SUBJECTIVE AND OBJECTIVE BOX
Patient was seen and examined  Patient is a 89y old  Female who presents with a chief complaint of Colitis (14 Apr 2025 16:20)      INTERVAL HPI/OVERNIGHT EVENTS:  T(C): 36.3 (04-15-25 @ 04:59), Max: 37.2 (04-14-25 @ 13:28)  HR: 70 (04-15-25 @ 04:59) (69 - 85)  BP: 115/89 (04-15-25 @ 04:59) (93/64 - 116/77)  RR: 19 (04-15-25 @ 04:59) (19 - 20)  SpO2: 100% (04-15-25 @ 04:59) (95% - 100%)  Wt(kg): --  I&O's Summary    14 Apr 2025 07:01  -  15 Apr 2025 07:00  --------------------------------------------------------  IN: 0 mL / OUT: 1150 mL / NET: -1150 mL        LABS:              CAPILLARY BLOOD GLUCOSE      POCT Blood Glucose.: 80 mg/dL (15 Apr 2025 08:02)  POCT Blood Glucose.: 156 mg/dL (14 Apr 2025 21:20)  POCT Blood Glucose.: 113 mg/dL (14 Apr 2025 16:51)  POCT Blood Glucose.: 217 mg/dL (14 Apr 2025 11:56)              MEDICATIONS  (STANDING):  apixaban 5 milliGRAM(s) Oral every 12 hours  atorvastatin 10 milliGRAM(s) Oral at bedtime  carvedilol 6.25 milliGRAM(s) Oral every 12 hours  chlorhexidine 2% Cloths 1 Application(s) Topical <User Schedule>  famotidine    Tablet 20 milliGRAM(s) Oral daily  fluticasone propionate/ salmeterol 100-50 MICROgram(s) Diskus 1 Dose(s) Inhalation two times a day  insulin glargine Injectable (LANTUS) 7 Unit(s) SubCutaneous at bedtime  insulin lispro (ADMELOG) corrective regimen sliding scale   SubCutaneous three times a day before meals  insulin lispro (ADMELOG) corrective regimen sliding scale   SubCutaneous at bedtime  metroNIDAZOLE  IVPB 500 milliGRAM(s) IV Intermittent every 12 hours  montelukast 10 milliGRAM(s) Oral daily  nystatin Powder 1 Application(s) Topical two times a day  polyethylene glycol 3350 17 Gram(s) Oral two times a day  senna 2 Tablet(s) Oral at bedtime  sodium chloride 0.9%. 1000 milliLiter(s) (70 mL/Hr) IV Continuous <Continuous>  tiotropium 2.5 MICROgram(s) Inhaler 2 Puff(s) Inhalation daily    MEDICATIONS  (PRN):  acetaminophen     Tablet .. 650 milliGRAM(s) Oral every 6 hours PRN Temp greater or equal to 38C (100.4F), Mild Pain (1 - 3)  albuterol/ipratropium for Nebulization 3 milliLiter(s) Nebulizer every 6 hours PRN Bronchospasm  ondansetron Injectable 4 milliGRAM(s) IV Push every 8 hours PRN Nausea and/or Vomiting      RADIOLOGY & ADDITIONAL TESTS:    Imaging Personally Reviewed:  [ ] YES  [ ] NO    REVIEW OF SYSTEMS:  CONSTITUTIONAL: No fever, weight loss, or fatigue  EYES: No eye pain, visual disturbances, or discharge  ENMT:  No difficulty hearing, tinnitus, vertigo; No sinus or throat pain  NECK: No pain or stiffness  BREASTS: No pain, masses, or nipple discharge  RESPIRATORY: No cough, wheezing, chills or hemoptysis; No shortness of breath  CARDIOVASCULAR: No chest pain, palpitations, dizziness, or leg swelling  GASTROINTESTINAL: No abdominal or epigastric pain. No nausea, vomiting, or hematemesis; No diarrhea or constipation. No melena or hematochezia.  GENITOURINARY: No dysuria, frequency, hematuria, or incontinence  NEUROLOGICAL: No headaches, memory loss, loss of strength, numbness, or tremors  SKIN: No itching, burning, rashes, or lesions   LYMPH NODES: No enlarged glands  ENDOCRINE: No heat or cold intolerance; No hair loss  MUSCULOSKELETAL: No joint pain or swelling; No muscle, back, or extremity pain  PSYCHIATRIC: No depression, anxiety, mood swings, or difficulty sleeping  HEME/LYMPH: No easy bruising, or bleeding gums  ALLERY AND IMMUNOLOGIC: No hives or eczema      Consultant(s) Notes Reviewed:  [ x ] YES  [ ] NO    PHYSICAL EXAM:  GENERAL: NAD, well-groomed, well-developed  HEAD:  Atraumatic, Normocephalic  EYES: EOMI, PERRLA, conjunctiva and sclera clear  ENMT: No tonsillar erythema, exudates, or enlargement; Moist mucous membranes, Good dentition, No lesions  NECK: Supple, No JVD, Normal thyroid  NERVOUS SYSTEM:  Alert & Oriented X3, Good concentration; Motor Strength 5/5 B/L upper and lower extremities; DTRs 2+ intact and symmetric  CHEST/LUNG: Clear to percussion bilaterally; No rales, rhonchi, wheezing, or rubs  HEART: Regular rate and rhythm; No murmurs, rubs, or gallops  ABDOMEN: Soft, Nontender, Nondistended; Bowel sounds present  EXTREMITIES:  2+ Peripheral Pulses, No clubbing, cyanosis, or edema  LYMPH: No lymphadenopathy noted  SKIN: No rashes or lesions    Care Discussed with Consultants/Other Providers [ x] YES  [ ] NO

## 2025-04-15 NOTE — PROGRESS NOTE ADULT - PROVIDER SPECIALTY LIST ADULT
Cardiology
Infectious Disease
Internal Medicine
Cardiology
Internal Medicine
Cardiology
Infectious Disease
Internal Medicine
Pulmonology
Internal Medicine
Pulmonology
Internal Medicine
Pulmonology
Internal Medicine

## 2025-04-15 NOTE — PROGRESS NOTE ADULT - PROBLEM SELECTOR PROBLEM 2
2019 novel coronavirus disease (COVID-19)
COPD, mild
2019 novel coronavirus disease (COVID-19)
COPD, mild
COPD, mild
2019 novel coronavirus disease (COVID-19)
2019 novel coronavirus disease (COVID-19)

## 2025-04-15 NOTE — DISCHARGE NOTE NURSING/CASE MANAGEMENT/SOCIAL WORK - FINANCIAL ASSISTANCE
[facility] provides services at a reduced cost to those who are determined to be eligible through [facility]’s financial assistance program. Information regarding [facility]’s financial assistance program can be found by going to [email or web address] or by calling [phone].

## 2025-04-15 NOTE — PROGRESS NOTE ADULT - PROBLEM SELECTOR PROBLEM 4
Chronic atrial fibrillation
Chronic atrial fibrillation
Diabetes mellitus
Diabetes mellitus
Chronic atrial fibrillation
Chronic atrial fibrillation
Diabetes mellitus
Diabetes mellitus
Chronic atrial fibrillation
Diabetes mellitus
Chronic atrial fibrillation

## 2025-04-15 NOTE — PROGRESS NOTE ADULT - PROBLEM SELECTOR PLAN 5
H/o DM on Lantus and Metformin at home  -A1c 10.6 (3/22/25) prior admission, followed by Endo Dr. Funk  -Controlled  -C/w Lantus 7units and [facility]  -C/w FS ACHS  -Monitor and adjust
Cont meds  Cardio follow up
Cont meds  Cardio follow up
H/o DM on Lantus and Metformin at home  -A1c 10.6 (3/22/25) prior admission, followed by Endo Dr. Funk  -Controlled  -C/w Lantus 7units and [facility]  -C/w FS ACHS  -Monitor and adjust
Cont meds  Cardio follow up
H/o DM on Lantus and Metformin at home  -A1c 10.6 (3/22/25) prior admission, followed by Endo Dr. Funk  -Controlled  -C/w Lantus 7units and [facility]  -C/w FS ACHS  -Monitor and adjust
hx of DM on lantus and metformin  -will start lantus 7U and ISS  - cont Fs ACHS
Ventricular rate controlled.  Continue Eliquis and coreg  Cardio Dr Siu.
Ventricular rate controlled.  Continue Eliquis and coreg  Cardio Dr Siu.
hx of DM on lantus and metformin  -will start lantus 7U and ISS  - cont Fs ACHS
H/o DM on Lantus and Metformin at home  -A1c 10.6 (3/22/25) prior admission, followed by Endo Dr. Funk  -Controlled  -C/w Lantus 7units and [facility]  -C/w FS ACHS  -Monitor and adjust

## 2025-04-15 NOTE — PROGRESS NOTE ADULT - PROBLEM SELECTOR PROBLEM 5
Diabetes mellitus
Chronic atrial fibrillation
Diabetes mellitus
Chronic atrial fibrillation
Chronic atrial fibrillation
Diabetes mellitus
Chronic atrial fibrillation
Diabetes mellitus
Diabetes mellitus
Chronic atrial fibrillation
Diabetes mellitus

## 2025-04-15 NOTE — DISCHARGE NOTE NURSING/CASE MANAGEMENT/SOCIAL WORK - NSDCPEFALRISK_GEN_ALL_CORE
For information on Fall & Injury Prevention, visit: [email or web address] OR  [email or web address] OR  https://www.cdc.gov/steadi/patient.html

## 2025-04-22 NOTE — PATIENT PROFILE ADULT - NSPRESCRALCAMT_GEN_A_NUR
Medical Necessity Information: It is in your best interest to select a reason for this procedure from the list below. All of these items fulfill various CMS LCD requirements except the new and changing color options. Show Spray Paint Technique Variable?: Yes Spray Paint Technique: No Medical Necessity Clause: This procedure was medically necessary because the lesions that were treated were: Post-Care Instructions: I reviewed with the patient in detail post-care instructions. Patient is to wear sunprotection, and avoid picking at any of the treated lesions. Pt may apply Vaseline to crusted or scabbing areas. Detail Level: Detailed Consent: The patient's consent was obtained including but not limited to risks of crusting, scabbing, blistering, scarring, darker or lighter pigmentary change, recurrence, incomplete removal and infection. Spray Paint Text: The liquid nitrogen was applied to the skin utilizing a spray paint frosting technique. 1 or 2

## 2025-05-03 ENCOUNTER — EMERGENCY (EMERGENCY)
Facility: HOSPITAL | Age: 89
LOS: 1 days | End: 2025-05-03
Attending: EMERGENCY MEDICINE
Payer: MEDICARE

## 2025-05-03 VITALS
HEIGHT: 72 IN | RESPIRATION RATE: 18 BRPM | HEART RATE: 106 BPM | TEMPERATURE: 99 F | OXYGEN SATURATION: 97 % | DIASTOLIC BLOOD PRESSURE: 85 MMHG | WEIGHT: 229.94 LBS | SYSTOLIC BLOOD PRESSURE: 122 MMHG

## 2025-05-03 LAB
ALBUMIN SERPL ELPH-MCNC: 2.7 G/DL — LOW (ref 3.5–5)
ALP SERPL-CCNC: 126 U/L — HIGH (ref 40–120)
ALT FLD-CCNC: 23 U/L DA — SIGNIFICANT CHANGE UP (ref 10–60)
ANION GAP SERPL CALC-SCNC: 6 MMOL/L — SIGNIFICANT CHANGE UP (ref 5–17)
AST SERPL-CCNC: 20 U/L — SIGNIFICANT CHANGE UP (ref 10–40)
BASOPHILS # BLD AUTO: 0.04 K/UL — SIGNIFICANT CHANGE UP (ref 0–0.2)
BASOPHILS NFR BLD AUTO: 0.4 % — SIGNIFICANT CHANGE UP (ref 0–2)
BILIRUB SERPL-MCNC: 1.4 MG/DL — HIGH (ref 0.2–1.2)
BUN SERPL-MCNC: 15 MG/DL — SIGNIFICANT CHANGE UP (ref 7–18)
CALCIUM SERPL-MCNC: 8.4 MG/DL — SIGNIFICANT CHANGE UP (ref 8.4–10.5)
CHLORIDE SERPL-SCNC: 97 MMOL/L — SIGNIFICANT CHANGE UP (ref 96–108)
CO2 SERPL-SCNC: 30 MMOL/L — SIGNIFICANT CHANGE UP (ref 22–31)
CREAT SERPL-MCNC: 0.85 MG/DL — SIGNIFICANT CHANGE UP (ref 0.5–1.3)
EGFR: 64 ML/MIN/1.73M2 — SIGNIFICANT CHANGE UP
EGFR: 64 ML/MIN/1.73M2 — SIGNIFICANT CHANGE UP
EOSINOPHIL # BLD AUTO: 0.15 K/UL — SIGNIFICANT CHANGE UP (ref 0–0.5)
EOSINOPHIL NFR BLD AUTO: 1.4 % — SIGNIFICANT CHANGE UP (ref 0–6)
GLUCOSE SERPL-MCNC: 113 MG/DL — HIGH (ref 70–99)
HCT VFR BLD CALC: 39.6 % — SIGNIFICANT CHANGE UP (ref 34.5–45)
HGB BLD-MCNC: 13.1 G/DL — SIGNIFICANT CHANGE UP (ref 11.5–15.5)
IMM GRANULOCYTES NFR BLD AUTO: 0.8 % — SIGNIFICANT CHANGE UP (ref 0–0.9)
LIDOCAIN IGE QN: 18 U/L — SIGNIFICANT CHANGE UP (ref 13–75)
LYMPHOCYTES # BLD AUTO: 1.79 K/UL — SIGNIFICANT CHANGE UP (ref 1–3.3)
LYMPHOCYTES # BLD AUTO: 16.8 % — SIGNIFICANT CHANGE UP (ref 13–44)
MCHC RBC-ENTMCNC: 31.3 PG — SIGNIFICANT CHANGE UP (ref 27–34)
MCHC RBC-ENTMCNC: 33.1 G/DL — SIGNIFICANT CHANGE UP (ref 32–36)
MCV RBC AUTO: 94.5 FL — SIGNIFICANT CHANGE UP (ref 80–100)
MONOCYTES # BLD AUTO: 0.78 K/UL — SIGNIFICANT CHANGE UP (ref 0–0.9)
MONOCYTES NFR BLD AUTO: 7.3 % — SIGNIFICANT CHANGE UP (ref 2–14)
NEUTROPHILS # BLD AUTO: 7.8 K/UL — HIGH (ref 1.8–7.4)
NEUTROPHILS NFR BLD AUTO: 73.3 % — SIGNIFICANT CHANGE UP (ref 43–77)
NRBC BLD AUTO-RTO: 0 /100 WBCS — SIGNIFICANT CHANGE UP (ref 0–0)
PLATELET # BLD AUTO: 201 K/UL — SIGNIFICANT CHANGE UP (ref 150–400)
POTASSIUM SERPL-MCNC: 3.4 MMOL/L — LOW (ref 3.5–5.3)
POTASSIUM SERPL-SCNC: 3.4 MMOL/L — LOW (ref 3.5–5.3)
PROT SERPL-MCNC: 6 G/DL — SIGNIFICANT CHANGE UP (ref 6–8.3)
RBC # BLD: 4.19 M/UL — SIGNIFICANT CHANGE UP (ref 3.8–5.2)
RBC # FLD: 14.9 % — HIGH (ref 10.3–14.5)
SODIUM SERPL-SCNC: 133 MMOL/L — LOW (ref 135–145)
WBC # BLD: 10.64 K/UL — HIGH (ref 3.8–10.5)
WBC # FLD AUTO: 10.64 K/UL — HIGH (ref 3.8–10.5)

## 2025-05-03 PROCEDURE — 99285 EMERGENCY DEPT VISIT HI MDM: CPT

## 2025-05-03 RX ORDER — ACETAMINOPHEN 500 MG/5ML
1000 LIQUID (ML) ORAL ONCE
Refills: 0 | Status: COMPLETED | OUTPATIENT
Start: 2025-05-03 | End: 2025-05-03

## 2025-05-03 RX ADMIN — Medication 400 MILLIGRAM(S): at 19:47

## 2025-05-03 NOTE — ED PROVIDER NOTE - PATIENT PORTAL LINK FT
You can access the FollowMyHealth Patient Portal offered by [facility] by registering at the following website: [email or web address]. By joining [facility]’s FollowMyHealth portal, you will also be able to view your health information using other applications (apps) compatible with our system.

## 2025-05-03 NOTE — ED PROVIDER NOTE - OBJECTIVE STATEMENT
89-year-old female history of COPD, CHF, A-fib on Eliquis, DM presenting with chronic right-sided flank pain that is worse when she moves.  Relates pain is constant and comes in waves.  Pain is nonradiating.  Patient was recently admission and treated for constipation.  Daughter relates that patient has now been experiencing loose stools.  Denies any other worsening or alleviating factors.  No fever, chills, or urinary symptoms.  No change in appetite.  History limited as patient is poor historian.

## 2025-05-03 NOTE — ED ADULT NURSE NOTE - HOW PATIENT ADDRESSED, PROFILE
I received a voicemail message on the [facility] cbn main line from Loulou at The Kaiser Foundation Hospital Financial Raman Gibbs RN
Jaz Constantino

## 2025-05-03 NOTE — ED ADULT NURSE NOTE - NS ED NURSE IV DC DT
Chief Complaint   Patient presents with   • URI       SUBJECTIVE:    Patient presents with officer yesterday.  He feels like he is going downhill.  He did have fever 105° F today.  He advil and fever has gone down.  He is still short of breath.  His wife was recently diagnosed with walking pneumonia and treated with Zithromax.    Patient denies headache and neck pain.  No sore throat.  No ear pain.  No nasal congestion or drainage.  Chest pain.  No nausea, vomiting or diarrhea.  No body aches.  No rash.    Social History     Socioeconomic History   • Marital status: /Civil Union     Spouse name: Not on file   • Number of children: Not on file   • Years of education: Not on file   • Highest education level: Not on file   Occupational History   • Not on file   Social Needs   • Financial resource strain: Not on file   • Food insecurity:     Worry: Not on file     Inability: Not on file   • Transportation needs:     Medical: Not on file     Non-medical: Not on file   Tobacco Use   • Smoking status: Never Smoker   • Smokeless tobacco: Never Used   Substance and Sexual Activity   • Alcohol use: Not on file   • Drug use: Not on file   • Sexual activity: Not on file   Lifestyle   • Physical activity:     Days per week: Not on file     Minutes per session: Not on file   • Stress: Not on file   Relationships   • Social connections:     Talks on phone: Not on file     Gets together: Not on file     Attends Gnosticism service: Not on file     Active member of club or organization: Not on file     Attends meetings of clubs or organizations: Not on file     Relationship status: Not on file   • Intimate partner violence:     Fear of current or ex partner: Not on file     Emotionally abused: Not on file     Physically abused: Not on file     Forced sexual activity: Not on file   Other Topics Concern   • Not on file   Social History Narrative   • Not on file         OBJECTIVE:  Visit Vitals  BP (!) 175/91 (BP Location: [facility]  Left forearm, Patient Position: Sitting, Cuff Size: Large Adult)   Pulse 109   Temp 100 °F (37.8 °C) (Temporal)   Resp 16   Ht 5' 9\" (1.753 m)   Wt (!) 172 kg   SpO2 93%   BMI 56.00 kg/m²     General:  Nontoxic appearing.  Skin:  No pallor or jaundice.  No erythema or petechiae.  HEENT:  Conjunctivae noninjected.  Tympanic membranes are normal.  Oral cavity is moist without lesions.  Lungs:  Slight expiratory wheeze.  No crackles. No retractions or increased work of breathing.  Oxygen saturation at 93% on room air.  Cor:  Regular rhythm no murmur or rub..  Neuro:  No meningeal signs.    Lower resp. tract infection  (primary encounter diagnosis)    Plan:  Worried patient may be developing pneumonia so he is started on doxycycline 100 mg b.i.d. #20. Also has slight expiratory wheeze social use albuterol meter dose inhaler 2 puffs q.4 hours p.r.n..    Obesity, unspecified classification, unspecified obesity type, unspecified whether serious comorbidity present    Plan:  Patient requested a letter for regular exercise at the gym.  I did try this for him.  I did review healthy diet and exercise to help achieve a healthy weight.         04-May-2025 05:43

## 2025-05-03 NOTE — ED PROVIDER NOTE - PROGRESS NOTE DETAILS
Patient granddaughter refused the patient from receiving stronger pain medication is open to lidocaine patch. Rosa M (Aimee Washington, DO pain improved after toradol family prefers to take pt home. all questions answered. return precautions discussed.

## 2025-05-03 NOTE — ED PROVIDER NOTE - CLINICAL SUMMARY MEDICAL DECISION MAKING FREE TEXT BOX
89-year-old male presenting with right-sided chronic flank pain.  Appears uncomfortable when she moves.  Otherwise well-nourished and well-hydrated.  Symptoms may suggest muscle strain, radiculopathy less likely UTI, renal colic, neuralgia among other causes.  Plan to perform labs, urinalysis, CT abdomen pelvis and reassess.

## 2025-05-03 NOTE — ED ADULT NURSE NOTE - OBJECTIVE STATEMENT
Pt Aox4, ambulates with a walker at baseline with c/o lower back pain x 1 week. Per daughter at bedside she reports patient has hemorrhoids coming out of rectum Denies trauma

## 2025-05-03 NOTE — ED PROVIDER NOTE - NSFOLLOWUPINSTRUCTIONS_ED_ALL_ED_FT
You were seen in the Emergency Department for UTI.     1) Advance activity as tolerated.   2) Continue all previously prescribed medications as directed.    3) Follow up with your primary care physician in 24-48 hours - take copies of your results.    4) Return to the Emergency Department for worsening or persistent symptoms, and/or ANY NEW OR CONCERNING SYMPTOMS.    take the full course of antibiotics even if you begin to feel better.     return for any worsening pain vomiting abdominal pain fever fatigue or any other new or concerning symptoms.     you can take tylenol/acetaminophen 975mg every 6 hours for pain (do not exceed 1000mg per dose or 4000mg per day). be sure that any other medications you are taking do not contain tylenol/acetaminophen. if other medications do include tylenol/acetaminophen, be sure to include this in your calculations of one time dose and daily dose.     you can also take motrin/ibuprofen 600mg for pain.     acetaminophen/tylenol can be taken at the same time as motrin/ibuprofen for maximum pain relief or 3 hours apart for continuous pain relief.    you can use lidocaine patches over the counter. these can be worn for 12 hours a day, once a day. do not place over infected or open skin.

## 2025-05-03 NOTE — ED ADULT NURSE NOTE - NSFALLHARMRISKINTERV_ED_ALL_ED

## 2025-05-04 VITALS
TEMPERATURE: 98 F | HEART RATE: 98 BPM | OXYGEN SATURATION: 96 % | DIASTOLIC BLOOD PRESSURE: 79 MMHG | SYSTOLIC BLOOD PRESSURE: 112 MMHG | RESPIRATION RATE: 18 BRPM

## 2025-05-04 LAB
APPEARANCE UR: ABNORMAL
BACTERIA # UR AUTO: ABNORMAL /HPF
BILIRUB UR-MCNC: NEGATIVE — SIGNIFICANT CHANGE UP
COLOR SPEC: YELLOW — SIGNIFICANT CHANGE UP
DIFF PNL FLD: ABNORMAL
EPI CELLS # UR: PRESENT
GLUCOSE UR QL: NEGATIVE MG/DL — SIGNIFICANT CHANGE UP
KETONES UR-MCNC: NEGATIVE MG/DL — SIGNIFICANT CHANGE UP
LEUKOCYTE ESTERASE UR-ACNC: ABNORMAL
NITRITE UR-MCNC: POSITIVE
PH UR: 6 — SIGNIFICANT CHANGE UP (ref 5–8)
PROT UR-MCNC: NEGATIVE MG/DL — SIGNIFICANT CHANGE UP
RBC CASTS # UR COMP ASSIST: 5 /HPF — HIGH (ref 0–4)
SP GR SPEC: 1.03 — SIGNIFICANT CHANGE UP (ref 1–1.03)
UROBILINOGEN FLD QL: 1 MG/DL — SIGNIFICANT CHANGE UP (ref 0.2–1)
WBC UR QL: ABNORMAL /HPF (ref 0–5)

## 2025-05-04 PROCEDURE — 74177 CT ABD & PELVIS W/CONTRAST: CPT | Mod: 26

## 2025-05-04 PROCEDURE — 80053 COMPREHEN METABOLIC PANEL: CPT

## 2025-05-04 PROCEDURE — 99284 EMERGENCY DEPT VISIT MOD MDM: CPT | Mod: 25

## 2025-05-04 PROCEDURE — 96374 THER/PROPH/DIAG INJ IV PUSH: CPT | Mod: XU

## 2025-05-04 PROCEDURE — 81001 URINALYSIS AUTO W/SCOPE: CPT

## 2025-05-04 PROCEDURE — 96375 TX/PRO/DX INJ NEW DRUG ADDON: CPT

## 2025-05-04 PROCEDURE — 83690 ASSAY OF LIPASE: CPT

## 2025-05-04 PROCEDURE — 87186 SC STD MICRODIL/AGAR DIL: CPT

## 2025-05-04 PROCEDURE — 36415 COLL VENOUS BLD VENIPUNCTURE: CPT

## 2025-05-04 PROCEDURE — 74177 CT ABD & PELVIS W/CONTRAST: CPT | Mod: MC

## 2025-05-04 PROCEDURE — 87086 URINE CULTURE/COLONY COUNT: CPT

## 2025-05-04 PROCEDURE — 85025 COMPLETE CBC W/AUTO DIFF WBC: CPT

## 2025-05-04 RX ORDER — LIDOCAINE HYDROCHLORIDE 20 MG/ML
1 JELLY TOPICAL ONCE
Refills: 0 | Status: COMPLETED | OUTPATIENT
Start: 2025-05-04 | End: 2025-05-04

## 2025-05-04 RX ORDER — CEFTRIAXONE 500 MG/1
1000 INJECTION, POWDER, FOR SOLUTION INTRAMUSCULAR; INTRAVENOUS ONCE
Refills: 0 | Status: COMPLETED | OUTPATIENT
Start: 2025-05-04 | End: 2025-05-04

## 2025-05-04 RX ORDER — KETOROLAC TROMETHAMINE 30 MG/ML
15 INJECTION, SOLUTION INTRAMUSCULAR; INTRAVENOUS ONCE
Refills: 0 | Status: DISCONTINUED | OUTPATIENT
Start: 2025-05-04 | End: 2025-05-04

## 2025-05-04 RX ORDER — CEFUROXIME SODIUM 1.5 G
1 VIAL (EA) INJECTION
Qty: 14 | Refills: 0
Start: 2025-05-04 | End: 2025-05-10

## 2025-05-04 RX ADMIN — KETOROLAC TROMETHAMINE 15 MILLIGRAM(S): 30 INJECTION, SOLUTION INTRAMUSCULAR; INTRAVENOUS at 03:48

## 2025-05-04 RX ADMIN — CEFTRIAXONE 100 MILLIGRAM(S): 500 INJECTION, POWDER, FOR SOLUTION INTRAMUSCULAR; INTRAVENOUS at 04:41

## 2025-05-04 RX ADMIN — LIDOCAINE HYDROCHLORIDE 1 PATCH: 20 JELLY TOPICAL at 02:48

## 2025-05-07 LAB
-  AMPICILLIN/SULBACTAM: SIGNIFICANT CHANGE UP
-  AMPICILLIN: SIGNIFICANT CHANGE UP
-  AZTREONAM: SIGNIFICANT CHANGE UP
-  CEFAZOLIN: SIGNIFICANT CHANGE UP
-  CEFEPIME: SIGNIFICANT CHANGE UP
-  CEFTRIAXONE: SIGNIFICANT CHANGE UP
-  CEFUROXIME: SIGNIFICANT CHANGE UP
-  CIPROFLOXACIN: SIGNIFICANT CHANGE UP
-  ERTAPENEM: SIGNIFICANT CHANGE UP
-  GENTAMICIN: SIGNIFICANT CHANGE UP
-  IMIPENEM: SIGNIFICANT CHANGE UP
-  LEVOFLOXACIN: SIGNIFICANT CHANGE UP
-  MEROPENEM: SIGNIFICANT CHANGE UP
-  NITROFURANTOIN: SIGNIFICANT CHANGE UP
-  PIPERACILLIN/TAZOBACTAM: SIGNIFICANT CHANGE UP
-  TIGECYCLINE: SIGNIFICANT CHANGE UP
-  TOBRAMYCIN: SIGNIFICANT CHANGE UP
-  TRIMETHOPRIM/SULFAMETHOXAZOLE: SIGNIFICANT CHANGE UP
CULTURE RESULTS: ABNORMAL
METHOD TYPE: SIGNIFICANT CHANGE UP
ORGANISM # SPEC MICROSCOPIC CNT: ABNORMAL
ORGANISM # SPEC MICROSCOPIC CNT: ABNORMAL
SPECIMEN SOURCE: SIGNIFICANT CHANGE UP

## 2025-05-07 RX ORDER — NITROFURANTOIN MACROCRYSTAL 100 MG
1 CAPSULE ORAL
Qty: 10 | Refills: 0
Start: 2025-05-07 | End: 2025-05-11

## 2025-07-06 NOTE — PHYSICAL THERAPY INITIAL EVALUATION ADULT - LONG TERM MEMORY, REHAB EVAL
intact Alert and orientedx4, ambulatory with steady gait. c/o L sided pelvic pain on and off for past 6 days. denies any urinary sx. hx inguinal hernia Denies fever, nausea/vomiting.